# Patient Record
Sex: FEMALE | Race: WHITE | NOT HISPANIC OR LATINO | URBAN - METROPOLITAN AREA
[De-identification: names, ages, dates, MRNs, and addresses within clinical notes are randomized per-mention and may not be internally consistent; named-entity substitution may affect disease eponyms.]

---

## 2017-06-26 ENCOUNTER — INPATIENT (INPATIENT)
Facility: HOSPITAL | Age: 82
LOS: 15 days | Discharge: HOME | DRG: 65 | End: 2017-07-12
Attending: SPECIALIST | Admitting: SPECIALIST
Payer: COMMERCIAL

## 2017-06-26 VITALS
OXYGEN SATURATION: 95 % | RESPIRATION RATE: 16 BRPM | HEART RATE: 57 BPM | TEMPERATURE: 37 F | SYSTOLIC BLOOD PRESSURE: 149 MMHG | DIASTOLIC BLOOD PRESSURE: 82 MMHG

## 2017-06-26 DIAGNOSIS — I61.9 NONTRAUMATIC INTRACEREBRAL HEMORRHAGE, UNSPECIFIED: ICD-10-CM

## 2017-06-26 LAB
ALBUMIN SERPL ELPH-MCNC: 4 G/DL — SIGNIFICANT CHANGE UP (ref 3.3–5)
ALP SERPL-CCNC: 98 U/L — SIGNIFICANT CHANGE UP (ref 40–120)
ALT FLD-CCNC: 14 U/L RC — SIGNIFICANT CHANGE UP (ref 10–45)
ANION GAP SERPL CALC-SCNC: 16 MMOL/L — SIGNIFICANT CHANGE UP (ref 5–17)
APTT BLD: 29.8 SEC — SIGNIFICANT CHANGE UP (ref 27.5–37.4)
AST SERPL-CCNC: 21 U/L — SIGNIFICANT CHANGE UP (ref 10–40)
BASOPHILS # BLD AUTO: 0 K/UL — SIGNIFICANT CHANGE UP (ref 0–0.2)
BASOPHILS NFR BLD AUTO: 0.1 % — SIGNIFICANT CHANGE UP (ref 0–2)
BILIRUB SERPL-MCNC: 1.2 MG/DL — SIGNIFICANT CHANGE UP (ref 0.2–1.2)
BUN SERPL-MCNC: 10 MG/DL — SIGNIFICANT CHANGE UP (ref 7–23)
CALCIUM SERPL-MCNC: 9.5 MG/DL — SIGNIFICANT CHANGE UP (ref 8.4–10.5)
CHLORIDE SERPL-SCNC: 98 MMOL/L — SIGNIFICANT CHANGE UP (ref 96–108)
CK MB BLD-MCNC: 2.8 % — SIGNIFICANT CHANGE UP (ref 0–3.5)
CK MB CFR SERPL CALC: 4.6 NG/ML — HIGH (ref 0–3.8)
CK SERPL-CCNC: 164 U/L — SIGNIFICANT CHANGE UP (ref 25–170)
CO2 SERPL-SCNC: 24 MMOL/L — SIGNIFICANT CHANGE UP (ref 22–31)
CREAT SERPL-MCNC: 0.74 MG/DL — SIGNIFICANT CHANGE UP (ref 0.5–1.3)
EOSINOPHIL # BLD AUTO: 0 K/UL — SIGNIFICANT CHANGE UP (ref 0–0.5)
EOSINOPHIL NFR BLD AUTO: 0.2 % — SIGNIFICANT CHANGE UP (ref 0–6)
ETHANOL SERPL-MCNC: SIGNIFICANT CHANGE UP MG/DL (ref 0–10)
GAS PNL BLDV: SIGNIFICANT CHANGE UP
GLUCOSE SERPL-MCNC: 130 MG/DL — HIGH (ref 70–99)
HCT VFR BLD CALC: 44.9 % — SIGNIFICANT CHANGE UP (ref 34.5–45)
HGB BLD-MCNC: 15.2 G/DL — SIGNIFICANT CHANGE UP (ref 11.5–15.5)
INR BLD: 0.93 RATIO — SIGNIFICANT CHANGE UP (ref 0.88–1.16)
LYMPHOCYTES # BLD AUTO: 0.7 K/UL — LOW (ref 1–3.3)
LYMPHOCYTES # BLD AUTO: 5.1 % — LOW (ref 13–44)
MCHC RBC-ENTMCNC: 33.9 GM/DL — SIGNIFICANT CHANGE UP (ref 32–36)
MCHC RBC-ENTMCNC: 36.3 PG — HIGH (ref 27–34)
MCV RBC AUTO: 107 FL — HIGH (ref 80–100)
MONOCYTES # BLD AUTO: 0.7 K/UL — SIGNIFICANT CHANGE UP (ref 0–0.9)
MONOCYTES NFR BLD AUTO: 4.9 % — SIGNIFICANT CHANGE UP (ref 2–14)
NEUTROPHILS # BLD AUTO: 12.2 K/UL — HIGH (ref 1.8–7.4)
NEUTROPHILS NFR BLD AUTO: 89.7 % — HIGH (ref 43–77)
PLATELET # BLD AUTO: 149 K/UL — LOW (ref 150–400)
POTASSIUM SERPL-MCNC: 4.2 MMOL/L — SIGNIFICANT CHANGE UP (ref 3.5–5.3)
POTASSIUM SERPL-SCNC: 4.2 MMOL/L — SIGNIFICANT CHANGE UP (ref 3.5–5.3)
PROT SERPL-MCNC: 7.4 G/DL — SIGNIFICANT CHANGE UP (ref 6–8.3)
PROTHROM AB SERPL-ACNC: 10.1 SEC — SIGNIFICANT CHANGE UP (ref 9.8–12.7)
RBC # BLD: 4.19 M/UL — SIGNIFICANT CHANGE UP (ref 3.8–5.2)
RBC # FLD: 12.6 % — SIGNIFICANT CHANGE UP (ref 10.3–14.5)
SODIUM SERPL-SCNC: 138 MMOL/L — SIGNIFICANT CHANGE UP (ref 135–145)
TROPONIN T SERPL-MCNC: 0.02 NG/ML — SIGNIFICANT CHANGE UP (ref 0–0.06)
WBC # BLD: 13.6 K/UL — HIGH (ref 3.8–10.5)
WBC # FLD AUTO: 13.6 K/UL — HIGH (ref 3.8–10.5)

## 2017-06-26 PROCEDURE — 72125 CT NECK SPINE W/O DYE: CPT | Mod: 26

## 2017-06-26 PROCEDURE — 71010: CPT | Mod: 26

## 2017-06-26 PROCEDURE — 72170 X-RAY EXAM OF PELVIS: CPT | Mod: 26

## 2017-06-26 PROCEDURE — 70450 CT HEAD/BRAIN W/O DYE: CPT | Mod: 26

## 2017-06-26 PROCEDURE — 73562 X-RAY EXAM OF KNEE 3: CPT | Mod: 26,LT

## 2017-06-26 PROCEDURE — 99285 EMERGENCY DEPT VISIT HI MDM: CPT

## 2017-06-26 RX ORDER — NYSTATIN CREAM 100000 [USP'U]/G
1 CREAM TOPICAL EVERY 8 HOURS
Qty: 0 | Refills: 0 | Status: DISCONTINUED | OUTPATIENT
Start: 2017-06-26 | End: 2017-07-12

## 2017-06-26 RX ORDER — NICARDIPINE HYDROCHLORIDE 30 MG/1
5 CAPSULE, EXTENDED RELEASE ORAL
Qty: 40 | Refills: 0 | Status: DISCONTINUED | OUTPATIENT
Start: 2017-06-26 | End: 2017-06-27

## 2017-06-26 RX ORDER — ACETAMINOPHEN 500 MG
650 TABLET ORAL EVERY 6 HOURS
Qty: 0 | Refills: 0 | Status: COMPLETED | OUTPATIENT
Start: 2017-06-26 | End: 2018-05-25

## 2017-06-26 RX ORDER — ACETAMINOPHEN 500 MG
650 TABLET ORAL EVERY 6 HOURS
Qty: 0 | Refills: 0 | Status: DISCONTINUED | OUTPATIENT
Start: 2017-06-26 | End: 2017-07-01

## 2017-06-26 RX ORDER — HYDRALAZINE HCL 50 MG
10 TABLET ORAL EVERY 6 HOURS
Qty: 0 | Refills: 0 | Status: DISCONTINUED | OUTPATIENT
Start: 2017-06-26 | End: 2017-07-07

## 2017-06-26 RX ORDER — ACETAMINOPHEN 500 MG
1000 TABLET ORAL ONCE
Qty: 0 | Refills: 0 | Status: COMPLETED | OUTPATIENT
Start: 2017-06-26 | End: 2017-06-27

## 2017-06-26 RX ORDER — SODIUM CHLORIDE 9 MG/ML
1000 INJECTION INTRAMUSCULAR; INTRAVENOUS; SUBCUTANEOUS
Qty: 0 | Refills: 0 | Status: DISCONTINUED | OUTPATIENT
Start: 2017-06-26 | End: 2017-06-27

## 2017-06-26 RX ORDER — HYDRALAZINE HCL 50 MG
5 TABLET ORAL EVERY 6 HOURS
Qty: 0 | Refills: 0 | Status: DISCONTINUED | OUTPATIENT
Start: 2017-06-26 | End: 2017-06-26

## 2017-06-26 RX ADMIN — NYSTATIN CREAM 1 APPLICATION(S): 100000 CREAM TOPICAL at 23:04

## 2017-06-26 RX ADMIN — Medication 10 MILLIGRAM(S): at 18:07

## 2017-06-26 RX ADMIN — Medication 650 MILLIGRAM(S): at 23:40

## 2017-06-26 RX ADMIN — SODIUM CHLORIDE 50 MILLILITER(S): 9 INJECTION INTRAMUSCULAR; INTRAVENOUS; SUBCUTANEOUS at 20:45

## 2017-06-26 RX ADMIN — Medication 650 MILLIGRAM(S): at 23:04

## 2017-06-26 NOTE — ED PROVIDER NOTE - ATTENDING CONTRIBUTION TO CARE
Private Physician Dina Barton, Previouisly admitted to [facility] female Private Physician HTN,HLD,etoh,mi, Lives alone found by aide this am. Pt had tried to get out of bed had left weakness. Was on floor for several hours. NCAT slight left facial droop. NCat chest clear anterior & posterior abd soft neuro gcs 15 left hemiplegia.  Piero Baptiste MD, Facep

## 2017-06-26 NOTE — H&P ADULT - PROBLEM SELECTOR PLAN 1
[] Strict BP control goal <160/90   [] Repeat CTH in 24hrs or prn for worsening mental status or neuro exam  [] q2 neuro checks, vitals  [] hold ASA, lovenox, use mechanical DVT prophylaxis  [] Telemetry Monitoring  [] MRI brain w/ and w/o cont to look for underlying lesions, malformations. MRA brain w/o contast MRA neck w/con  [] HgA1c, Lipid profile

## 2017-06-26 NOTE — H&P ADULT - NSHPLABSRESULTS_GEN_ALL_CORE
LABS:  CBC Full  -  ( 26 Jun 2017 14:08 )  WBC Count : 13.6 K/uL  Hemoglobin : 15.2 g/dL  Hematocrit : 44.9 %  Platelet Count - Automated : 149 K/uL  Mean Cell Volume : 107.0 fl  Mean Cell Hemoglobin : 36.3 pg  Mean Cell Hemoglobin Concentration : 33.9 gm/dL  Auto Neutrophil # : x  Auto Lymphocyte # : x  Auto Monocyte # : x  Auto Eosinophil # : x  Auto Basophil # : x  Auto Neutrophil % : x  Auto Lymphocyte % : x  Auto Monocyte % : x  Auto Eosinophil % : x  Auto Basophil % : x      06-26    138  |  98  |  10  ----------------------------<  130<H>  4.2   |  24  |  0.74    Ca    9.5      26 Jun 2017 14:08    TPro  7.4  /  Alb  4.0  /  TBili  1.2  /  DBili  x   /  AST  21  /  ALT  14  /  AlkPhos  98  06-26    LIVER FUNCTIONS - ( 26 Jun 2017 14:08 )  Alb: 4.0 g/dL / Pro: 7.4 g/dL / ALK PHOS: 98 U/L / ALT: 14 U/L RC / AST: 21 U/L / GGT: x           Hemoglobin A1C:       PT/INR - ( 26 Jun 2017 14:08 )   PT: 10.1 sec;   INR: 0.93 ratio         PTT - ( 26 Jun 2017 14:08 )  PTT:29.8 sec      RADIOLOGY  Noncontrast CT brain: Acute right intraparenchymal hemorrhage as   described above.    CT cervicalspine:  No evidence for acute displaced fracture or traumatic   malalignment. Cervical degenerative spondylosis, as described above. MRI   can be performed if there is concern for ligamentous or cord injury, and   if there are no MRI contraindications.

## 2017-06-26 NOTE — H&P ADULT - ASSESSMENT
82 F PMH HTN, GERD, EOTH abuse, choly p/w left facial, left sided weakness. PE dysarthria, Left facial left hemiplegia. CTh Acute intraparenchymal hemorrhage just lateral to the right thalamic body. NIHSS 11, MRS 3 Dysphagia pending ICH vol 82 F PMH HTN, GERD, EOTH abuse, choly p/w left facial, left sided weakness. PE dysarthria, Left facial left hemiplegia. CTh Acute intraparenchymal hemorrhage just lateral to the right thalamic body. NIHSS 11, MRS 3 Dysphagia pending ICH vol 4 ml, ICH score 1

## 2017-06-26 NOTE — H&P ADULT - ATTENDING COMMENTS
VASCULAR NEUROLOGY ATTENDING  The patient is seen and examined the history and imaging are reviewed. I agree with the resident note unless otherwise noted. In brief 82 year old woman with HTN presents with R BG presumed hypertensive hemorrhage. On exam this AM moderate to severe dysarthria. L facial L hemiplegia and decreased L sided sensation. Impression: R hypertensive ICH. Will maintain SBP about below 160 repeat CT head today. Avoid hyponatremia. MRI wwo contrast.  CIWA. PT/OT/SS evals.

## 2017-06-26 NOTE — ED PROVIDER NOTE - OBJECTIVE STATEMENT
82yF h/o etoh abuse, HTN, HLD, UTI BIBEMS after fall out of bed this morning with L sided facial droop and L upper and lower extremity weakness. Last known normal yesterday. Patient reports last drink yesterday. Denies pain. Denies head trauma or LOC. On daily ASA.  Code stroke called upon arrival.

## 2017-06-26 NOTE — ED PROVIDER NOTE - PMH
Dyslipidemia    ETOH abuse    Gout    HTN (hypertension)    MI (myocardial infarction)    Personal history of asbestosis    UTI (lower urinary tract infection)

## 2017-06-26 NOTE — H&P ADULT - HISTORY OF PRESENT ILLNESS
Neurology consult    FRANKI [id number]     Patient is a 82y old  Female who presents with a chief complaint of left sided weakness    HPI: 82 F PMH HTN, GERn, EOTH abuse, choly p/w left side weakness. Code Stroke 13:47. LNK 1 am. Pt woke up this am tried to get out of bed and fell to the floor, Aide noted left sided weakness, dysarthria. Code stroke called for above. Pt poor historian, limited hs, Endorses HA, Nausea and left sided numbness. At Baseline walks with cane and aide helps with ADLs.          MEDICATIONS    allopurinol, ASA, Citalopram, metoprolol, famotidine, synthroid, atorvastatin    PMH: ETOH abuse  UTI (lower urinary tract infection)  Dyslipidemia  Gout  Personal history of asbestosis  HTN (hypertension)  MI (myocardial infarction)       PSH: History of benign breast tumor  History of left shoulder fracture      Family history: No history of dementia, strokes, or seizures   FAMILY HISTORY:  No pertinent family history in first degree relatives      SOCIAL HISTORY:  No history of tobacco or alcohol use     Allergies    No Known Allergies    Intolerances            Vital Signs Last 24 Hrs  T(C): 2.6, Max: 2.6 (06-26 @ 13:47)  T(F): 36.6, Max: 36.6 (06-26 @ 13:47)  HR: 57 (57 - 57)  BP: 149/82 (149/82 - 149/82)  BP(mean): --  RR: 16 (16 - 16)  SpO2: 95% (95% - 95%)      On Neurological Examination:    Mental Status - Patient is alert, awake, oriented X3. fluent, names, no dysarthria no aphasia Follows commands well and able to answer questions appropriately. Mood and affect  normal    Cranial Nerves - PERRL, EOMI, VFF, V1-V3 intact, no gross facial asymmetry, tongue/uvula midline    Motor Exam -   Right upper  Left upper  Right lower  Left lower      nml bulk/tone    Sensory    Intact to light touch and pinprick bilaterally    Coord: FTN intact bilaterally     Gait -  normal  ataxia     Reflexes:                                                         GENERAL Exam:     Nontoxic , No Acute Distress   	  HEENT:  normocephalic, atraumatic  		  LUNGS:	Clear bilaterally  No Wheeze  Decreased bilaterally  	  HEART:	 Normal S1S2   No murmur RRR        	  GI/ ABDOMEN:  Soft  Non tender    EXTREMITIES:   No Edema  No Clubbing  No Cyanosis No Edema    MUSCULOSKELETAL: Normal Range of Motion  	   SKIN:      Normal   No Ecchymosis               LABS:  CBC Full  -  ( 26 Jun 2017 14:08 )  WBC Count : 13.6 K/uL  Hemoglobin : 15.2 g/dL  Hematocrit : 44.9 %  Platelet Count - Automated : 149 K/uL  Mean Cell Volume : 107.0 fl  Mean Cell Hemoglobin : 36.3 pg  Mean Cell Hemoglobin Concentration : 33.9 gm/dL  Auto Neutrophil # : x  Auto Lymphocyte # : x  Auto Monocyte # : x  Auto Eosinophil # : x  Auto Basophil # : x  Auto Neutrophil % : x  Auto Lymphocyte % : x  Auto Monocyte % : x  Auto Eosinophil % : x  Auto Basophil % : x      06-26    138  |  98  |  10  ----------------------------<  130<H>  4.2   |  24  |  0.74    Ca    9.5      26 Jun 2017 14:08    TPro  7.4  /  Alb  4.0  /  TBili  1.2  /  DBili  x   /  AST  21  /  ALT  14  /  AlkPhos  98  06-26    LIVER FUNCTIONS - ( 26 Jun 2017 14:08 )  Alb: 4.0 g/dL / Pro: 7.4 g/dL / ALK PHOS: 98 U/L / ALT: 14 U/L RC / AST: 21 U/L / GGT: x           Hemoglobin A1C:       PT/INR - ( 26 Jun 2017 14:08 )   PT: 10.1 sec;   INR: 0.93 ratio         PTT - ( 26 Jun 2017 14:08 )  PTT:29.8 sec      RADIOLOGY  CTH   MRI: Neurology consult    FRANKI [id number]     Patient is a 82y old  Female who presents with a chief complaint of left sided weakness    HPI: 82 F PMH HTN, GERD, EOTH abuse, choly on ASA p/w left side weakness. Code Stroke 13:47. LNK 1 am. Pt woke up this am tried to get out of bed and fell to the floor, Aide noted left sided weakness, dysarthria. Code stroke called for above. Pt poor historian, limited hs, Endorses HA, Nausea and left sided numbness. At Baseline walks with cane and aide helps with ADLs.          MEDICATIONS    allopurinol, ASA, Citalopram, metoprolol, famotidine, synthroid, atorvastatin      SOCIAL HISTORY:  No history of tobacco or alcohol use     Allergies    No Known Allergies    Intolerances

## 2017-06-26 NOTE — ED PROVIDER NOTE - MEDICAL DECISION MAKING DETAILS
82yF h/o etoh, HTN, HLD BIBEMS with L sided weakness and fall out of bed this morning. On examination, no motor strength against gravity on LUE or LLE with L sided facial droop and slurred speech. Normal FSG. Last known normal yesterday. Code stroke called upon arrival. Ho PGY3

## 2017-06-26 NOTE — H&P ADULT - NSHPREVIEWOFSYSTEMS_GEN_ALL_CORE
REVIEW OF SYSTEMS:    Constitutional: No fever, chills, fatigue, weakness  Eyes: no eye pain, visual disturbances, or discharge  ENT:  No difficulty hearing, tinnitus, vertigo; No sinus or throat pain  Neck: No pain or stiffness  Respiratory: No cough, dyspnea, wheezing   Cardiovascular: No chest pain, palpitations,   Gastrointestinal: + Nausea  Genitourinary: No dysuria, frequency, hematuria or incontinence  Neurological: see HPI  Psychiatric: No depression, anxiety, mood swings or difficulty sleeping  Musculoskeletal: No joint pain or swelling; No muscle, back or extremity pain  Skin: No itching, burning, rashes or lesions   Lymph Nodes: No enlarged glands  Endocrine: No heat or cold intolerance; No hair loss, No h/o diabetes or thyroid dysfunction  Allergy and Immunologic: No hives or eczema

## 2017-06-27 ENCOUNTER — TRANSCRIPTION ENCOUNTER (OUTPATIENT)
Age: 82
End: 2017-06-27

## 2017-06-27 LAB
ALBUMIN SERPL ELPH-MCNC: 3.4 G/DL — SIGNIFICANT CHANGE UP (ref 3.3–5)
ALP SERPL-CCNC: 84 U/L — SIGNIFICANT CHANGE UP (ref 40–120)
ALT FLD-CCNC: 12 U/L — SIGNIFICANT CHANGE UP (ref 10–45)
ANION GAP SERPL CALC-SCNC: 17 MMOL/L — SIGNIFICANT CHANGE UP (ref 5–17)
APPEARANCE UR: ABNORMAL
AST SERPL-CCNC: 30 U/L — SIGNIFICANT CHANGE UP (ref 10–40)
BILIRUB DIRECT SERPL-MCNC: 0.2 MG/DL — SIGNIFICANT CHANGE UP (ref 0–0.2)
BILIRUB INDIRECT FLD-MCNC: 1 MG/DL — SIGNIFICANT CHANGE UP (ref 0.2–1)
BILIRUB SERPL-MCNC: 1.2 MG/DL — SIGNIFICANT CHANGE UP (ref 0.2–1.2)
BILIRUB UR-MCNC: ABNORMAL
BUN SERPL-MCNC: 12 MG/DL — SIGNIFICANT CHANGE UP (ref 7–23)
CALCIUM SERPL-MCNC: 8.5 MG/DL — SIGNIFICANT CHANGE UP (ref 8.4–10.5)
CHLORIDE SERPL-SCNC: 100 MMOL/L — SIGNIFICANT CHANGE UP (ref 96–108)
CO2 SERPL-SCNC: 20 MMOL/L — LOW (ref 22–31)
COLOR SPEC: YELLOW — SIGNIFICANT CHANGE UP
CREAT SERPL-MCNC: 0.87 MG/DL — SIGNIFICANT CHANGE UP (ref 0.5–1.3)
DIFF PNL FLD: NEGATIVE — SIGNIFICANT CHANGE UP
EPI CELLS # UR: SIGNIFICANT CHANGE UP /HPF
GLUCOSE SERPL-MCNC: 94 MG/DL — SIGNIFICANT CHANGE UP (ref 70–99)
GLUCOSE UR QL: NEGATIVE — SIGNIFICANT CHANGE UP
HCT VFR BLD CALC: 44.9 % — SIGNIFICANT CHANGE UP (ref 34.5–45)
HGB BLD-MCNC: 14.4 G/DL — SIGNIFICANT CHANGE UP (ref 11.5–15.5)
KETONES UR-MCNC: NEGATIVE — SIGNIFICANT CHANGE UP
LEUKOCYTE ESTERASE UR-ACNC: ABNORMAL
MAGNESIUM SERPL-MCNC: 1.7 MG/DL — SIGNIFICANT CHANGE UP (ref 1.6–2.6)
MCHC RBC-ENTMCNC: 32.1 GM/DL — SIGNIFICANT CHANGE UP (ref 32–36)
MCHC RBC-ENTMCNC: 34 PG — SIGNIFICANT CHANGE UP (ref 27–34)
MCV RBC AUTO: 106.1 FL — HIGH (ref 80–100)
NITRITE UR-MCNC: NEGATIVE — SIGNIFICANT CHANGE UP
PH UR: 6 — SIGNIFICANT CHANGE UP (ref 5–8)
PHOSPHATE SERPL-MCNC: 3.2 MG/DL — SIGNIFICANT CHANGE UP (ref 2.5–4.5)
PLATELET # BLD AUTO: 122 K/UL — LOW (ref 150–400)
POTASSIUM SERPL-MCNC: 4.3 MMOL/L — SIGNIFICANT CHANGE UP (ref 3.5–5.3)
POTASSIUM SERPL-SCNC: 4.3 MMOL/L — SIGNIFICANT CHANGE UP (ref 3.5–5.3)
PROT SERPL-MCNC: 6.6 G/DL — SIGNIFICANT CHANGE UP (ref 6–8.3)
PROT UR-MCNC: 30 MG/DL
RBC # BLD: 4.23 M/UL — SIGNIFICANT CHANGE UP (ref 3.8–5.2)
RBC # FLD: 14.7 % — HIGH (ref 10.3–14.5)
RBC CASTS # UR COMP ASSIST: SIGNIFICANT CHANGE UP /HPF (ref 0–2)
SODIUM SERPL-SCNC: 137 MMOL/L — SIGNIFICANT CHANGE UP (ref 135–145)
SP GR SPEC: 1.02 — SIGNIFICANT CHANGE UP (ref 1.01–1.02)
UROBILINOGEN FLD QL: 2
WBC # BLD: 6.34 K/UL — SIGNIFICANT CHANGE UP (ref 3.8–10.5)
WBC # FLD AUTO: 6.34 K/UL — SIGNIFICANT CHANGE UP (ref 3.8–10.5)
WBC UR QL: SIGNIFICANT CHANGE UP /HPF (ref 0–5)

## 2017-06-27 PROCEDURE — 70450 CT HEAD/BRAIN W/O DYE: CPT | Mod: 26

## 2017-06-27 RX ORDER — CEFTRIAXONE 500 MG/1
1 INJECTION, POWDER, FOR SOLUTION INTRAMUSCULAR; INTRAVENOUS ONCE
Qty: 0 | Refills: 0 | Status: COMPLETED | OUTPATIENT
Start: 2017-06-27 | End: 2017-06-27

## 2017-06-27 RX ORDER — ONDANSETRON 8 MG/1
4 TABLET, FILM COATED ORAL ONCE
Qty: 0 | Refills: 0 | Status: COMPLETED | OUTPATIENT
Start: 2017-06-27 | End: 2017-06-27

## 2017-06-27 RX ORDER — CEFTRIAXONE 500 MG/1
INJECTION, POWDER, FOR SOLUTION INTRAMUSCULAR; INTRAVENOUS
Qty: 0 | Refills: 0 | Status: DISCONTINUED | OUTPATIENT
Start: 2017-06-27 | End: 2017-07-05

## 2017-06-27 RX ORDER — ACETAMINOPHEN 500 MG
1000 TABLET ORAL ONCE
Qty: 0 | Refills: 0 | Status: COMPLETED | OUTPATIENT
Start: 2017-06-27 | End: 2017-06-27

## 2017-06-27 RX ORDER — SODIUM CHLORIDE 9 MG/ML
1000 INJECTION INTRAMUSCULAR; INTRAVENOUS; SUBCUTANEOUS
Qty: 0 | Refills: 0 | Status: DISCONTINUED | OUTPATIENT
Start: 2017-06-27 | End: 2017-06-29

## 2017-06-27 RX ORDER — CEFTRIAXONE 500 MG/1
1 INJECTION, POWDER, FOR SOLUTION INTRAMUSCULAR; INTRAVENOUS EVERY 24 HOURS
Qty: 0 | Refills: 0 | Status: DISCONTINUED | OUTPATIENT
Start: 2017-06-28 | End: 2017-07-05

## 2017-06-27 RX ORDER — ONDANSETRON 8 MG/1
4 TABLET, FILM COATED ORAL EVERY 6 HOURS
Qty: 0 | Refills: 0 | Status: DISCONTINUED | OUTPATIENT
Start: 2017-06-27 | End: 2017-07-12

## 2017-06-27 RX ADMIN — NYSTATIN CREAM 1 APPLICATION(S): 100000 CREAM TOPICAL at 16:45

## 2017-06-27 RX ADMIN — ONDANSETRON 4 MILLIGRAM(S): 8 TABLET, FILM COATED ORAL at 00:30

## 2017-06-27 RX ADMIN — Medication 1000 MILLIGRAM(S): at 14:45

## 2017-06-27 RX ADMIN — Medication 400 MILLIGRAM(S): at 12:40

## 2017-06-27 RX ADMIN — Medication 10 MILLIGRAM(S): at 22:56

## 2017-06-27 RX ADMIN — Medication 650 MILLIGRAM(S): at 22:30

## 2017-06-27 RX ADMIN — Medication 400 MILLIGRAM(S): at 13:44

## 2017-06-27 RX ADMIN — Medication 10 MILLIGRAM(S): at 08:47

## 2017-06-27 RX ADMIN — Medication 650 MILLIGRAM(S): at 23:00

## 2017-06-27 RX ADMIN — NYSTATIN CREAM 1 APPLICATION(S): 100000 CREAM TOPICAL at 07:27

## 2017-06-27 RX ADMIN — Medication 1000 MILLIGRAM(S): at 11:43

## 2017-06-27 RX ADMIN — NYSTATIN CREAM 1 APPLICATION(S): 100000 CREAM TOPICAL at 22:31

## 2017-06-27 RX ADMIN — CEFTRIAXONE 100 GRAM(S): 500 INJECTION, POWDER, FOR SOLUTION INTRAMUSCULAR; INTRAVENOUS at 10:49

## 2017-06-27 RX ADMIN — ONDANSETRON 4 MILLIGRAM(S): 8 TABLET, FILM COATED ORAL at 19:54

## 2017-06-27 NOTE — DIETITIAN INITIAL EVALUATION ADULT. - ENERGY NEEDS
Ht: 63"  Wt: 180  BMI: 31.9 kg/m2   IBW:115  (+/-10%)    156% IBW  Edema: none    Skin: no pressure injuries

## 2017-06-27 NOTE — OCCUPATIONAL THERAPY INITIAL EVALUATION ADULT - PLANNED THERAPY INTERVENTIONS, OT EVAL
ADL retraining/motor coordination training/transfer training/fine motor coordination training/strengthening/balance training/neuromuscular re-education/ROM/bed mobility training

## 2017-06-27 NOTE — DISCHARGE NOTE ADULT - PLAN OF CARE
Prevention of recurrence Please follow up with vascular neurology, Dr. Story Please continue all meds as directed and follow up with Dr. Story as next available.  Please repeat EEG in 3 months to re-evaluate need to continue with Keppra.  Please follow-up with your cardiologist Deandre Schmitz as next available. Please continue all meds as directed and follow up with Dr. Story as next available.  Please repeat EEG in 3 months to re-evaluate need to continue with Keppra.  Please follow-up with your cardiologist Dr. Carrera as next available.

## 2017-06-27 NOTE — SWALLOW BEDSIDE ASSESSMENT ADULT - SWALLOW EVAL: PATIENT/FAMILY GOALS STATEMENT
Pt denies prior h/o dysphagia. Pt repeatedly asking for water, unable to understand explanations of rationale for NPO status, despite multiple attempts to rephrase.

## 2017-06-27 NOTE — DISCHARGE NOTE ADULT - OTHER SIGNIFICANT FINDINGS
A1c 5.5, LDL 86    CT Head 06/26/17:   Acute intraparenchymal hemorrhage just lateral to the right thalamic body which measures 2.7 x 1.5 x 1.9 cm (AP x TV x CC). There is no midline shift or central herniation. Prominent ventricles and sulci secondary to parenchymal volume loss.    CT Head 06/27/17:  Grossly stable 2.9 x 1.9 cm parenchymal hemorrhage in the region of the posterior right lentiform nucleus.    MRI Brain w/o:   Right thalamocapsular region hemorrhage as seen on the prior CT. No abnormal enhancement to suggest an underlying lesion however recommend repeat study post resolution of the hemorrhage for better evaluation    MRA Head/Neck:   Suspicion of a moderate stenosis approximately 2 cm distal to the origin of the right internal carotid artery. Atherosclerotic irregularity at the level of the left internal carotid artery is suggested. Recommend correlation with other noninvasive vascular evaluation. Consider CTA and/or Doppler to better quantify the degree of stenosis.    TTE: Mildly dilated LA, LV EF 65%   1. Normal left ventricular internal dimensions and wall thicknesses.  2. Normal left ventricular systolic function. No segmental wall motion abnormalities.  3. Normal diastolic function  4. Normal right ventricular size and function.

## 2017-06-27 NOTE — OCCUPATIONAL THERAPY INITIAL EVALUATION ADULT - NS ASR FOLLOW COMMAND OT EVAL
100% of the time/difficulty with multistep commands, decreased attention, tangential/able to follow single-step instructions

## 2017-06-27 NOTE — DISCHARGE NOTE ADULT - NS AS DC STROKE ED MATERIALS
Stroke Warning Signs and Symptoms/Risk Factors for Stroke/Need for Followup After Discharge/Stroke Education Booklet/Prescribed Medications/Call 911 for Stroke

## 2017-06-27 NOTE — DISCHARGE NOTE ADULT - MEDICATION SUMMARY - MEDICATIONS TO TAKE
I will START or STAY ON the medications listed below when I get home from the hospital:    lisinopril 40 mg oral tablet  -- 1 tab(s) by mouth once a day  -- Indication: For HTN    CeleXA 40 mg oral tablet  -- 1 tab(s) by mouth once a day  -- Indication: For antidepressant    ondansetron 4 mg oral tablet  -- 1 tab(s) by mouth every 6 hours, As needed, Nausea and/or Vomiting  -- Indication: For Nausea    allopurinol 300 mg oral tablet  -- 1 tab(s) by mouth once a day  -- Indication: For gout    metoprolol succinate 25 mg oral tablet, extended release  -- 1 tab(s) by mouth once a day  -- Indication: For HTN    nystatin 100,000 units/g topical cream  -- 1 application on skin every 8 hours  -- Indication: For antifungal cream    Pepcid 20 mg oral tablet  --  by mouth once a day  -- Indication: For PPI    levothyroxine 50 mcg (0.05 mg) oral tablet  -- 1 tab(s) by mouth once a day  -- Indication: For Hypothyroidism    folic acid 1 mg oral tablet  -- 1 tab(s) by mouth once a day  -- Indication: For supplement I will START or STAY ON the medications listed below when I get home from the hospital:    lisinopril 40 mg oral tablet  -- 1 tab(s) by mouth once a day  -- Indication: For HTN    Keppra 500 mg oral tablet  -- 1 tab(s) by gastrostomy tube 2 times a day  -- Indication: For Seizure    LORazepam 1 mg oral tablet  -- 1 tab(s) by mouth every 8 hours, As needed, Agitation  -- Indication: For Agitation    CeleXA 40 mg oral tablet  -- 1 tab(s) by mouth once a day  -- Indication: For antidepressant    ondansetron 4 mg oral tablet  -- 1 tab(s) by mouth every 6 hours, As needed, Nausea and/or Vomiting  -- Indication: For Nausea    allopurinol 300 mg oral tablet  -- 1 tab(s) by mouth once a day  -- Indication: For gout    QUEtiapine 25 mg oral tablet  -- 1 tab(s) by mouth once a day (at bedtime)  -- Indication: For Delirium    metoprolol succinate 25 mg oral tablet, extended release  -- 1 tab(s) by mouth once a day  -- Indication: For HTN    amLODIPine 10 mg oral tablet  -- 1 tab(s) by mouth once a day  -- Indication: For HTN    nystatin 100,000 units/g topical cream  -- 1 application on skin every 8 hours  -- Indication: For antifungal cream    Pepcid 20 mg oral tablet  --  by mouth once a day  -- Indication: For PPI    levothyroxine 50 mcg (0.05 mg) oral tablet  -- 1 tab(s) by mouth once a day  -- Indication: For Hypothyroidism    folic acid 1 mg oral tablet  -- 1 tab(s) by mouth once a day  -- Indication: For supplement I will START or STAY ON the medications listed below when I get home from the hospital:    lisinopril 40 mg oral tablet  -- 1 tab(s) by mouth once a day  -- Indication: For HTN    Keppra 500 mg oral tablet  -- 1 tab(s) by gastrostomy tube 2 times a day  -- Indication: For Seizure    LORazepam 1 mg oral tablet  -- 1 tab(s) by mouth every 8 hours, As needed, Agitation  -- Indication: For Agitation    CeleXA 40 mg oral tablet  -- 1 tab(s) by mouth once a day  -- Indication: For antidepressant    ondansetron 4 mg oral tablet  -- 1 tab(s) by mouth every 6 hours, As needed, Nausea and/or Vomiting  -- Indication: For Nausea    allopurinol 300 mg oral tablet  -- 1 tab(s) by mouth once a day  -- Indication: For gout    QUEtiapine 25 mg oral tablet  -- 1 tab(s) by mouth once a day (at bedtime)  -- Indication: For Delirium    metoprolol succinate 25 mg oral tablet, extended release  -- 1 tab(s) by mouth once a day  -- Indication: For HTN    amLODIPine 10 mg oral tablet  -- 1 tab(s) by mouth once a day  -- Indication: For HTN    nystatin 100,000 units/g topical cream  -- 1 application on skin every 8 hours  -- Indication: For antifungal cream    Pepcid 20 mg oral tablet  --  by mouth once a day  -- Indication: For GERD    pantoprazole 40 mg oral granule, enteric coated  -- 40 milligram(s) by mouth once a day  -- Indication: For GERD    levothyroxine 50 mcg (0.05 mg) oral tablet  -- 1 tab(s) by mouth once a day  -- Indication: For Hypothyroidism    folic acid 1 mg oral tablet  -- 1 tab(s) by mouth once a day  -- Indication: For supplement

## 2017-06-27 NOTE — OCCUPATIONAL THERAPY INITIAL EVALUATION ADULT - RANGE OF MOTION EXAMINATION, UPPER EXTREMITY
Right UE Active ROM was WFL (within functional limits)/Left UE Active ROM was WFL (within functional limits)

## 2017-06-27 NOTE — PHYSICAL THERAPY INITIAL EVALUATION ADULT - PASSIVE RANGE OF MOTION EXAMINATION, REHAB EVAL
L hand fisted/bilateral upper extremity Passive ROM was WFL (within functional limits)/bilateral lower extremity Passive ROM was WFL (within functional limits)

## 2017-06-27 NOTE — DISCHARGE NOTE ADULT - CARE PROVIDER_API CALL
Torsten Story (DO), Neurology; Vascular Neurology  [address] [facility] [address]  [place] [address]  Phone: [phone]  Fax: [phone] Torsten Story (DO), Neurology; Vascular Neurology  [address] [facility] [address]  [place] [address]  Phone: [phone]  Fax: [phone]    Xiao Carrera), Cardiovascular Disease; Internal Medicine  [address] [facility] [address] [place] [address]  Phone: [phone]  Fax: [phone]

## 2017-06-27 NOTE — OCCUPATIONAL THERAPY INITIAL EVALUATION ADULT - LIVES WITH, PROFILE
pt lives alone in a co-op with elevator, bed/bath on 1st level, bath with shower chair, pt has an aid from 9-5/7 days a week to assist with ADLs/alone

## 2017-06-27 NOTE — SPEECH LANGUAGE PATHOLOGY EVALUATION - SLP DIAGNOSIS
Attempted to see pt for swallow evaluation and speech-language evaluation. Chart reviewed. Pt currently off unit for test.  Will attempt to f/u, as time permits. Pt seen for initiation of speech and language evaluation. Pt presents with dysarthria notable for reduced articulatory precision and cognitive-linguistics notable for impairments in attention, executive functioning and working memory.

## 2017-06-27 NOTE — PHYSICAL THERAPY INITIAL EVALUATION ADULT - PERTINENT HX OF CURRENT PROBLEM, REHAB EVAL
82yoF, pmhx below. p/w chief complaint of L-sided weakness. Code Stroke 13:47. Pt reporting waking up this AM, tried to get out of bed and fell to the floor, aide noted L-sided weakness, dysarthria. Code stroke called for above. CT head 6/26 Acute right intraparenchymal hemorrhage, CT C-spine Cervical degenerative spondylosis. CXR 6/26 Orthopedic hardware is seen in the left shoulder and left humerus. pelvis xray (-) 6/26.

## 2017-06-27 NOTE — SPEECH LANGUAGE PATHOLOGY EVALUATION - COMMENTS
Hx cont'd: Neuro Exam: Mental Status - is alert, awake, OX3. dysarthric, names, fluent. Cranial Nerves - PERRL, EOMI, VFF, V1-V3 intact, L facial droop, tongue/uvula midline. Motor Exam - RUE 4+/5, LUE 1/5, RLE 4/5 subtle drift, LLE 1/5, nml bulk/tone; Sensory - decreased to LT LUe, LLE  CT C-spine:  No evidence for acute displaced fracture or traumatic malalignment. Cervical degenerative spondylosis, as described above. MRI   can be performed if concern for ligamentous or cord injury, and if no contraindications.

## 2017-06-27 NOTE — PHYSICAL THERAPY INITIAL EVALUATION ADULT - IMPAIRED TRANSFERS: SIT/STAND, REHAB EVAL
decreased strength/impaired postural control/narrow base of support/impaired balance/cognition/impaired motor control

## 2017-06-27 NOTE — OCCUPATIONAL THERAPY INITIAL EVALUATION ADULT - PERTINENT HX OF CURRENT PROBLEM, REHAB EVAL
82 F PMH HTN, GERD, EOTH abuse, choly on ASA p/w left side weakness. Code Stroke 13:47. LNK 1 am. Pt woke up this am tried to get out of bed and fell to the floor, Aide noted left sided weakness, dysarthria. Pt poor historian, limited hs, Endorses HA, Nausea and left sided numbness. At Baseline walks with cane and aide helps with ADLs.

## 2017-06-27 NOTE — DISCHARGE NOTE ADULT - MEDICATION SUMMARY - MEDICATIONS TO STOP TAKING
I will STOP taking the medications listed below when I get home from the hospital:    aspirin 81 mg oral delayed release tablet  -- 1 tab(s) by mouth once a day    Lipitor 10 mg oral tablet  -- 1 tab(s) by mouth once a day

## 2017-06-27 NOTE — PHYSICAL THERAPY INITIAL EVALUATION ADULT - TRANSFER SAFETY CONCERNS NOTED: SIT/STAND, REHAB EVAL
decreased balance during turns/decreased safety awareness/decreased sequencing ability/decreased weight-shifting ability/losing balance/LLE buckling, requiring L knee block/decreased step length

## 2017-06-27 NOTE — SWALLOW BEDSIDE ASSESSMENT ADULT - SLP PERTINENT HISTORY OF CURRENT PROBLEM
82 F PMH HTN, GERD, EOTH abuse, choly on ASA p/w left side weakness, left facial droop, dysarthria. Code Stroke 13:47. LNK 1 am. Pt woke up this am tried to get out of bed and fell to floor, Aide noted L side weakness, dysarthria. Code stroke called. Pt poor historian, limited hx, Endorses HA, Nausea and L sided numbness. At Baseline walks with cane, aide helps with ADLs. CTH: Acute R intraparenchymal hemorrhage just lateral to R thalamic body. NIHSS 11, MRS 3 Dysphagia pending ICH vol 4 ml, ICH score 1.

## 2017-06-27 NOTE — OCCUPATIONAL THERAPY INITIAL EVALUATION ADULT - RANGE OF MOTION EXAMINATION, LOWER EXTREMITY
Left LE Active ROM was WFL (within functional limits)/Right LE Active ROM was WFL   (within functional limits)

## 2017-06-27 NOTE — DISCHARGE NOTE ADULT - HOSPITAL COURSE
81 y/o female w/ PMH of HTN, GERD, ETOH abuse and on ASA who presented with left sided weakness, dysarthria, and left facial droop. Pt reported she fell out of bed and was found 4 hours later by her aide. Code stroke called, no tPA given due to being outside the window and IPH on CTH. Pt endorses a persistent headache spaning from her neck to her forehead. CTh revealed acute intraparenchymal hemorrhage in the posterior right lentiform nucleus.    Urinalysis positive for UTI, treated with IV ceftriaxone.    Pt failed MBS and PEG to be placed on 6/30    MRI Brain with and without contrast on 6/28 showed right thalamocapsular region hemorrhage as seen on the prior CT.  MRA Head and Neck with and without contrast on 6/28 suggested moderate stenosis approximately 2 cm distal to the origin of the right internal carotid artery and atherosclerotic irregularity at the level of the left internal carotid artery.  Had an episode of left facial twitching for which a routine EEG was ordered and showed_____.    Patient is now deemed safe for discharge to acute rehab. 83 y/o female w/ PMH of HTN, GERD, ETOH abuse and on ASA who presented with left sided weakness, dysarthria, and left facial droop. Pt reported she fell out of bed and was found 4 hours later by her aide. Code stroke called, no tPA given due to being outside the window and IPH on CTH. Pt endorses a persistent headache spaning from her neck to her forehead. CTh revealed acute intraparenchymal hemorrhage in the posterior right lentiform nucleus.    Hospital Course:     Pt was admitted to the Stroke service for further management. 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 Etiology of pt's ICH is likely 2/2 HTN. Her home Asa was discontinued.     Her hospital course was complicated by UTI, which she was treated with IV Ceftriaxone x 7 days.     Of note, she also had a transient episode of L face twitching. VEEG x 2 days found R PLEDs. She was started on Keppra 500mg BID. No additional seizure events noted for the remainder of her hospital stay.     She was seen by Speech and Swallow, who recommended NPO. A PEG was placed by IR given her esophageal strictures. She is currently tolerating her PEG feeds.   Pt was seen and evaluated by PT/OT, who recommended Acute Rehab. Please continue all meds as directed. Follow-up with Dr. Story as next available. Please repeat EEG in 3 months to re-evaluate need to continue with Keppra. 81 y/o female w/ PMH of HTN, GERD, ETOH abuse and on ASA who presented with left sided weakness, dysarthria, and left facial droop. Pt reported she fell out of bed and was found 4 hours later by her aide. Code stroke called, no tPA given due to being outside the window and IPH on CTH. Pt endorses a persistent headache spaning from her neck to her forehead. CTh revealed acute intraparenchymal hemorrhage in the posterior right lentiform nucleus.    Hospital Course:     Pt was admitted to the Stroke service for further management. 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 Etiology of pt's ICH is likely 2/2 HTN. Her home Asa was discontinued.     Her hospital course was complicated by UTI, which she was treated with IV Ceftriaxone x 7 days.     Of note, she also had a transient episode of L face twitching. VEEG x 2 days found R PLEDs. She was started on Keppra 500mg BID. No additional seizure events noted for the remainder of her hospital stay.     She was seen by Speech and Swallow, who recommended NPO. A PEG was placed by IR given her esophageal strictures. She is currently tolerating her PEG feeds.   Pt was seen and evaluated by PT/OT, who recommended Acute Rehab. Please continue all meds as directed. Follow-up with Dr. Story as next available. Please repeat EEG in 3 months to re-evaluate need to continue with Keppra.   Patient insurance denied acute rehab, will followup with social work for subacute rehab placement for patient. 81 y/o female w/ PMH of HTN, GERD, ETOH abuse and on ASA who presented with left sided weakness, dysarthria, and left facial droop. Pt reported she fell out of bed and was found 4 hours later by her aide. Code stroke called, no tPA given due to being outside the window and IPH on CTH. Pt endorses a persistent headache spaning from her neck to her forehead. CTh revealed acute intraparenchymal hemorrhage in the posterior right lentiform nucleus.    Hospital Course:     Pt was admitted to the Stroke service for further management. 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 Etiology of pt's ICH is likely 2/2 HTN. Her home Asa was discontinued.     Her hospital course was complicated by UTI, which she was treated with IV Ceftriaxone x 7 days.     Of note, she also had a transient episode of L face twitching. VEEG x 2 days found R PLEDs. She was started on Keppra 500mg BID. No additional seizure events noted for the remainder of her hospital stay.     She was seen by Speech and Swallow, who recommended NPO. A PEG was placed by IR, given her esophageal strictures. She is currently tolerating her PEG feeds.   Pt was seen and evaluated by PT/OT, who recommended Rehab.   Please continue all meds as directed. Follow-up with Dr. Story as next available. Please repeat EEG in 3 months to re-evaluate need to continue with Keppra.

## 2017-06-27 NOTE — SWALLOW BEDSIDE ASSESSMENT ADULT - SLP GENERAL OBSERVATIONS
Dysphagia screen failed, Part 3. Pt seen at bedside, awake and alert, cooperative, verbally responsive, Ox3. Pt presents with dysarthria notable for reduced articulatory precision. Pt presents with cognitive-linguistic deficits notable for tangential discourse and short-term memory deficits.

## 2017-06-27 NOTE — OCCUPATIONAL THERAPY INITIAL EVALUATION ADULT - MANUAL MUSCLE TESTING RESULTS, REHAB EVAL
grossly assessed due to/RUE/RLE 3/5 grossly, L shoulder/elbow 2-/5, L wrist/digits 0/5, L ankle 0/5, L hip/knee 2-/5

## 2017-06-27 NOTE — DIETITIAN INITIAL EVALUATION ADULT. - OTHER INFO
Pt seen for: consult for chewing/swallowing difficulty.  Adm dx:  ICH, pt on CIWA protocol   GI issues: no N/V/D  Last BM:  6/26 fecal incontinence   Food allergies: NKFA   Vit/supplement PTA: folic acid

## 2017-06-27 NOTE — DISCHARGE NOTE ADULT - PATIENT PORTAL LINK FT
“You can access the [employer] Patient Portal, offered by [facility], by registering with the following website: [email or web address]”

## 2017-06-27 NOTE — PHYSICAL THERAPY INITIAL EVALUATION ADULT - IMPAIRMENTS CONTRIBUTING TO GAIT DEVIATIONS, PT EVAL
impaired balance/impaired motor control/impaired postural control/narrow base of support/decreased strength

## 2017-06-27 NOTE — DISCHARGE NOTE ADULT - ADDITIONAL INSTRUCTIONS
Jevity 1.2 @ 60 ml/hr x 24 hours/day to provide 1728 calories, 80 g protein: 21 calories/kg, 1.0 g protein/kg of dosing wt 81.6 kg

## 2017-06-27 NOTE — SWALLOW BEDSIDE ASSESSMENT ADULT - SWALLOW EVAL: DIAGNOSIS
Attempted to see pt for swallow evaluation and speech-language evaluation. Chart reviewed. Pt currently off unit for test.  Will attempt to f/u, as time permits. Pt presents with 1) oropharyngeal dysphagia notable for s/s laryngeal penetration/aspiration with most conservative consistencies, 2) dysarthria.

## 2017-06-27 NOTE — PHYSICAL THERAPY INITIAL EVALUATION ADULT - ADDITIONAL COMMENTS
As per pt, pt lives alone, in apt, no steps to enter, elevator access present. PTA, pt ambulatory with cane. HHA 7days, 8 hrs.

## 2017-06-27 NOTE — OCCUPATIONAL THERAPY INITIAL EVALUATION ADULT - DIAGNOSIS, OT EVAL
Pt demonstrated decreased strength, functional mobility, balance,ROM and ADLs Pt demonstrated decreased strength, functional mobility, balance, ROM and ADLs

## 2017-06-27 NOTE — SWALLOW BEDSIDE ASSESSMENT ADULT - COMMENTS
Neuro Exam: Mental Status - is alert, awake, OX3. dysarthric, names, fluent. Cranial Nerves - PERRL, EOMI, VFF, V1-V3 intact, L facial droop, tongue/uvula midline. Motor Exam - RUE 4+/5, LUE 1/5, RLE 4/5 subtle drift, LLE 1/5, nml bulk/tone; Sensory - decreased to LT LUe, LLE  CT C-spine:  No evidence for acute displaced fracture or traumatic malalignment. Cervical degenerative spondylosis, as described above. MRI   can be performed if concern for ligamentous or cord injury, and if no contraindications. Hx cont'd: Neuro Exam: Mental Status - is alert, awake, OX3. dysarthric, names, fluent. Cranial Nerves - PERRL, EOMI, VFF, V1-V3 intact, L facial droop, tongue/uvula midline. Motor Exam - RUE 4+/5, LUE 1/5, RLE 4/5 subtle drift, LLE 1/5, nml bulk/tone; Sensory - decreased to LT LUe, LLE  CT C-spine:  No evidence for acute displaced fracture or traumatic malalignment. Cervical degenerative spondylosis, as described above. MRI   can be performed if concern for ligamentous or cord injury, and if no contraindications.

## 2017-06-27 NOTE — CONSULT NOTE ADULT - SUBJECTIVE AND OBJECTIVE BOX
Patient is a 82y old  Female who presents with a chief complaint of left sided weakness (2017 04:50)      HPI:  Neurology consult    FRANKI [id number]     Patient is a 82y old  Female who presented with a chief complaint of left sided weakness    HPI: 82 F PMH HTN, GERD, EOTH abuse, choly on ASA p/w left side weakness.     CT scan of brain- Acute right intraparenchymal hemorrhage as   described above.    CT cervical spine:  No evidence for acute displaced fracture or traumatic   malalignment. Cervical degenerative spondylosis, as described above. MRI   can be performed if there is concern for ligamentous or cord injury, and   if there are no MRI contraindications          MEDICATIONS    allopurinol, ASA, Citalopram, metoprolol, famotidine, synthroid, atorvastatin      SOCIAL HISTORY:  No history of tobacco or alcohol use     Allergies    No Known Allergies    Intolerances (2017 14:34)      REVIEW OF SYSTEMS: No chest pain, shortness of breath, nausea, vomiting or diarhea.      PAST MEDICAL & SURGICAL HISTORY  ETOH abuse  UTI (lower urinary tract infection)  Dyslipidemia  Gout  Personal history of asbestosis  HTN (hypertension)  MI (myocardial infarction)  History of benign breast tumor  History of left shoulder fracture    FUNCTIONAL HISTORY:   Lives alone  HHA 8h/d to assist w ADLs      FAMILY HISTORY   No pertinent family history in first degree relatives      RECENT LABS/IMAGING  CBC Full  -  ( 2017 07:28 )  WBC Count : 6.34 K/uL  Hemoglobin : 14.4 g/dL  Hematocrit : 44.9 %  Platelet Count - Automated : 122 K/uL  Mean Cell Volume : 106.1 fl  Mean Cell Hemoglobin : 34.0 pg  Mean Cell Hemoglobin Concentration : 32.1 gm/dL  Auto Neutrophil # : x  Auto Lymphocyte # : x  Auto Monocyte # : x  Auto Eosinophil # : x  Auto Basophil # : x  Auto Neutrophil % : x  Auto Lymphocyte % : x  Auto Monocyte % : x  Auto Eosinophil % : x  Auto Basophil % : x        138  |  98  |  10  ----------------------------<  130<H>  4.2   |  24  |  0.74    Ca    9.5      2017 14:08    TPro  7.4  /  Alb  4.0  /  TBili  1.2  /  DBili  x   /  AST  21  /  ALT  14  /  AlkPhos  98      Urinalysis Basic - ( 2017 07:12 )    Color: Yellow / Appearance: x / S.020 / pH: x  Gluc: x / Ketone: Negative  / Bili: Small / Urobili: 2   Blood: x / Protein: 30 mg/dL / Nitrite: Negative   Leuk Esterase: Large / RBC: 3-5 /HPF / WBC 11-25 /HPF   Sq Epi: x / Non Sq Epi: Few /HPF / Bacteria: x        VITALS  T(C): 36.6 (17 @ 08:00), Max: 36.8 (17 @ 20:00)  HR: 87 (17 @ 11:01) (57 - 95)  BP: 137/63 (17 @ 11:01) (110/67 - 189/165)  RR: 19 (17 @ 10:32) (14 - 22)  SpO2: 100% (17 @ 11:01) (94% - 100%)  Wt(kg): --    ALLERGIES  No Known Allergies      MEDICATIONS   hydrALAZINE Injectable 10 milliGRAM(s) IV Push every 6 hours PRN  niCARdipine Infusion 5 mG/Hr IV Continuous <Continuous>  nystatin Cream 1 Application(s) Topical every 8 hours  acetaminophen  IVPB. 1000 milliGRAM(s) IV Intermittent once  acetaminophen  Suppository. 650 milliGRAM(s) Rectal every 6 hours PRN  ondansetron    Tablet 4 milliGRAM(s) Oral every 6 hours PRN  sodium chloride 0.9%. 1000 milliLiter(s) IV Continuous <Continuous>  acetaminophen  IVPB. 1000 milliGRAM(s) IV Intermittent once  cefTRIAXone   IVPB   IV Intermittent       ----------------------------------------------------------------------------------------  PHYSICAL EXAM  Constitutional - NAD, Comfortable  HEENT - NCAT, EOMI  Neck - Supple, No limited ROM  Chest - CTA bilaterally, No wheeze, No rhonchi, No crackles  Cardiovascular - RRR, S1S2, No murmurs  Abdomen - BS+, Soft, NTND  Extremities - No C/C/E, No calf tenderness   Neurologic Exam -                    Cognitive - Awake, Alert, AAO to self, place, date, year, situation     Communication - Fluent, No dysarthria, no aphasia     Cranial Nerves - CN 2-12 intact     Motor - No focal deficits                       Sensory - Intact to LT     Reflexes - DTR Intact, No primitive reflexive     Balance - WNL Static  Psychiatric - Mood stable, Affect WNL      Impression:  83 yo female with CVA with      Plan:  PT- ROM, Bed Mob, Transfers, Amb w AD and bracing as needed  OT- ADLs, bracing  SLP- Dysphagia eval and treat  Prec- Falls, Cardiac  DVT Prophylaxis  Skin- Turn q2 h  Dispo- Patient is a 82y old  Female with a chief complaint of left sided weakness    HPI:  81 yo F with PMH HTN, GERD, EOTH abuse presented to [facility] on 17 with left sided weakness after waking up in AM and falling on floor. CTH showed acute right intraparenchymal hemorrhage. Admitted for neurological monitoring. Pending further workup. BP being monitored closely as suspected cause of ICH.    REVIEW OF SYSTEMS: No chest pain, shortness of breath, nausea, vomiting or diarrhea. Left shoulder pain (2/2 prior injury)    PAST MEDICAL & SURGICAL HISTORY  ETOH abuse  UTI (lower urinary tract infection)  Dyslipidemia  Gout  Personal history of asbestosis  HTN (hypertension)  MI (myocardial infarction)  History of benign breast tumor  History of left shoulder fracture    FUNCTIONAL HISTORY:   Lives alone in co-op, no stairs  HHA 8h/d, 7d/wk to assist w ADLs. Ambulates with cane for long distances and upon awakening.    FUNCTIONAL EXAM:  mod-max A with bed mobility, transfers, max A with gait (2steps), ADLs    FAMILY HISTORY   No pertinent family history in first degree relatives    RECENT LABS/IMAGING  CBC Full  -  ( 2017 07:28 )  WBC Count : 6.34 K/uL  Hemoglobin : 14.4 g/dL  Hematocrit : 44.9 %  Platelet Count - Automated : 122 K/uL  Mean Cell Volume : 106.1 fl  Mean Cell Hemoglobin : 34.0 pg  Mean Cell Hemoglobin Concentration : 32.1 gm/dL  Auto Neutrophil # : x  Auto Lymphocyte # : x  Auto Monocyte # : x  Auto Eosinophil # : x  Auto Basophil # : x  Auto Neutrophil % : x  Auto Lymphocyte % : x  Auto Monocyte % : x  Auto Eosinophil % : x  Auto Basophil % : x        138  |  98  |  10  ----------------------------<  130<H>  4.2   |  24  |  0.74    Ca    9.5      2017 14:08    TPro  7.4  /  Alb  4.0  /  TBili  1.2  /  DBili  x   /  AST  21  /  ALT  14  /  AlkPhos  98      Urinalysis Basic - ( 2017 07:12 )  Color: Yellow / Appearance: x / S.020 / pH: x  Gluc: x / Ketone: Negative  / Bili: Small / Urobili: 2   Blood: x / Protein: 30 mg/dL / Nitrite: Negative   Leuk Esterase: Large / RBC: 3-5 /HPF / WBC 11-25 /HPF   Sq Epi: x / Non Sq Epi: Few /HPF / Bacteria: x    VITALS  T(C): 36.6 (17 @ 08:00), Max: 36.8 (17 @ 20:00)  HR: 87 (17 @ 11:01) (57 - 95)  BP: 137/63 (17 @ 11:01) (110/67 - 189/165)  RR: 19 (17 @ 10:32) (14 - 22)  SpO2: 100% (17 @ 11:01) (94% - 100%)  Wt(kg): --    ALLERGIES  No Known Allergies    MEDICATIONS   hydrALAZINE Injectable 10 milliGRAM(s) IV Push every 6 hours PRN  niCARdipine Infusion 5 mG/Hr IV Continuous <Continuous>  nystatin Cream 1 Application(s) Topical every 8 hours  acetaminophen  IVPB. 1000 milliGRAM(s) IV Intermittent once  acetaminophen  Suppository. 650 milliGRAM(s) Rectal every 6 hours PRN  ondansetron    Tablet 4 milliGRAM(s) Oral every 6 hours PRN  sodium chloride 0.9%. 1000 milliLiter(s) IV Continuous <Continuous>  acetaminophen  IVPB. 1000 milliGRAM(s) IV Intermittent once  cefTRIAXone   IVPB   IV Intermittent     ----------------------------------------------------------------------------------------  PHYSICAL EXAM  Constitutional - NAD, Comfortable  HEENT - NCAT  Neck - Supple, No limited ROM  Chest - CTA bilaterally, No wheeze, No rhonchi, No crackles  Cardiovascular - RRR, S1S2, No murmurs  Abdomen - BS+, Soft, NTND  Extremities - No calf tenderness   Neurologic Exam -                    Cognitive - Awake, Alert, AAO to self, place, date, year, situation. WORLD backwards intact. Repetition intact, difficulty with recall at 3 min (3/3 with hints)     Communication - Fluent, +dysarthria, no aphasia     Cranial Nerves - left facial droop and numbness in V3 area     Motor - 5/5 in RUE and RLE. Left: 1/5 EF, EE, WF, , HF, KF, PF, EHL. 0/5 ShAbd, WE, KE, DF. Spasticity in LUE     Sensory - diminished to LT over LUE and LLE     Reflexes - DTR Intact, +L Babinski     Balance - WNL Static  Psychiatric - Mood stable, Affect WNL    Impression:  81 yo F with right IPH with left hemiparesis, dysarthria    Plan:  PT- ROM, Bed Mob, Transfers, Amb w AD and bracing as needed  OT- ADLs, bracing  SLP- Speech/Dysphagia eval and treat  Prec- Falls  SCDs for DVT Prophylaxis until AC is started as per neuro  Skin- Turn q2 h. Recommend L resting hand splint and PRAFO  Dispo- Recommend acute rehab on dc as pt could tolerate 3 hrs/day therapy for 5 days/wk Patient is a 82y old  Female with a chief complaint of left sided weakness    HPI:  81 yo F with PMH HTN, GERD, EOTH abuse presented to [facility] on 17 with left sided weakness after waking up in AM and falling on floor. CTH showed acute right intraparenchymal hemorrhage. Admitted for neurological monitoring. Pending further workup. BP being monitored closely as suspected cause of ICH.    REVIEW OF SYSTEMS: No chest pain, shortness of breath, nausea, vomiting or diarrhea. Left shoulder pain (2/2 prior injury)    PAST MEDICAL & SURGICAL HISTORY  ETOH abuse  UTI (lower urinary tract infection)  Dyslipidemia  Gout  Personal history of asbestosis  HTN (hypertension)  MI (myocardial infarction)  History of benign breast tumor  History of left shoulder fracture    FUNCTIONAL HISTORY:   Lives alone in co-op, no stairs  HHA 8h/d, 7d/wk to assist w ADLs. Ambulates with cane for long distances and upon awakening.    FUNCTIONAL EXAM:  mod-max A with bed mobility, transfers, max A with gait (2steps), ADLs    FAMILY HISTORY   No pertinent family history in first degree relatives    RECENT LABS/IMAGING  CBC Full  -  ( 2017 07:28 )  WBC Count : 6.34 K/uL  Hemoglobin : 14.4 g/dL  Hematocrit : 44.9 %  Platelet Count - Automated : 122 K/uL  Mean Cell Volume : 106.1 fl  Mean Cell Hemoglobin : 34.0 pg  Mean Cell Hemoglobin Concentration : 32.1 gm/dL  Auto Neutrophil # : x  Auto Lymphocyte # : x  Auto Monocyte # : x  Auto Eosinophil # : x  Auto Basophil # : x  Auto Neutrophil % : x  Auto Lymphocyte % : x  Auto Monocyte % : x  Auto Eosinophil % : x  Auto Basophil % : x        138  |  98  |  10  ----------------------------<  130<H>  4.2   |  24  |  0.74    Ca    9.5      2017 14:08    TPro  7.4  /  Alb  4.0  /  TBili  1.2  /  DBili  x   /  AST  21  /  ALT  14  /  AlkPhos  98      Urinalysis Basic - ( 2017 07:12 )  Color: Yellow / Appearance: x / S.020 / pH: x  Gluc: x / Ketone: Negative  / Bili: Small / Urobili: 2   Blood: x / Protein: 30 mg/dL / Nitrite: Negative   Leuk Esterase: Large / RBC: 3-5 /HPF / WBC 11-25 /HPF   Sq Epi: x / Non Sq Epi: Few /HPF / Bacteria: x    VITALS  T(C): 36.6 (17 @ 08:00), Max: 36.8 (17 @ 20:00)  HR: 87 (17 @ 11:01) (57 - 95)  BP: 137/63 (17 @ 11:01) (110/67 - 189/165)  RR: 19 (17 @ 10:32) (14 - 22)  SpO2: 100% (17 @ 11:01) (94% - 100%)  Wt(kg): --    ALLERGIES  No Known Allergies    MEDICATIONS   hydrALAZINE Injectable 10 milliGRAM(s) IV Push every 6 hours PRN  niCARdipine Infusion 5 mG/Hr IV Continuous <Continuous>  nystatin Cream 1 Application(s) Topical every 8 hours  acetaminophen  IVPB. 1000 milliGRAM(s) IV Intermittent once  acetaminophen  Suppository. 650 milliGRAM(s) Rectal every 6 hours PRN  ondansetron    Tablet 4 milliGRAM(s) Oral every 6 hours PRN  sodium chloride 0.9%. 1000 milliLiter(s) IV Continuous <Continuous>  acetaminophen  IVPB. 1000 milliGRAM(s) IV Intermittent once  cefTRIAXone   IVPB   IV Intermittent     ----------------------------------------------------------------------------------------  PHYSICAL EXAM  Constitutional - NAD, Comfortable  HEENT - NCAT  Neck - Supple, No limited ROM  Chest - CTA bilaterally, No wheeze, No rhonchi, No crackles  Cardiovascular - RRR, S1S2, No murmurs  Abdomen - BS+, Soft, NTND  Extremities - No calf tenderness   Neurologic Exam -                    Cognitive - Awake, Alert, AAO to self, place, date, year, situation. WORLD backwards intact. Repetition intact, difficulty with recall at 3 min (3/3 with hints). Right gaze preference     Communication - Fluent, +dysarthria, no aphasia     Cranial Nerves - left facial droop and numbness in V3 area     Motor - 5/5 in RUE and RLE. Left: 1/5 EF, EE, WF, , HF, KF, PF, EHL. 0/5 ShAbd, WE, KE, DF. Spasticity in LUE     Sensory - diminished to LT over LUE and LLE     Reflexes - DTR Intact, +L Babinski     Balance - WNL Static  Psychiatric - Mood stable, Affect WNL    Impression:  81 yo F with right IPH with left hemiparesis, dysarthria    Plan:  PT- ROM, Bed Mob, Transfers, Amb w AD and bracing as needed  OT- ADLs, bracing  SLP- Speech/Dysphagia eval and treat  Prec- Falls  SCDs for DVT Prophylaxis until AC is started as per neuro  Skin- Turn q2 h. Recommend L resting hand splint and PRAFO  Dispo- Recommend acute rehab on dc as pt could tolerate 3 hrs/day therapy for 5 days/wk

## 2017-06-27 NOTE — DISCHARGE NOTE ADULT - CARE PLAN
Principal Discharge DX:	Intracerebral hemorrhage  Goal:	Prevention of recurrence  Instructions for follow-up, activity and diet:	Please follow up with vascular neurology, Dr. Story Principal Discharge DX:	Intracerebral hemorrhage  Goal:	Prevention of recurrence  Instructions for follow-up, activity and diet:	Please continue all meds as directed and follow up with Dr. Story as next available.  Please repeat EEG in 3 months to re-evaluate need to continue with Keppra.  Please follow-up with your cardiologist Deandre Schmitz as next available. Principal Discharge DX:	Intracerebral hemorrhage  Goal:	Prevention of recurrence  Instructions for follow-up, activity and diet:	Please continue all meds as directed and follow up with Dr. Story as next available.  Please repeat EEG in 3 months to re-evaluate need to continue with Keppra.  Please follow-up with your cardiologist Dr. Carrera as next available.

## 2017-06-28 LAB
ANION GAP SERPL CALC-SCNC: 17 MMOL/L — SIGNIFICANT CHANGE UP (ref 5–17)
BUN SERPL-MCNC: 12 MG/DL — SIGNIFICANT CHANGE UP (ref 7–23)
CALCIUM SERPL-MCNC: 8.7 MG/DL — SIGNIFICANT CHANGE UP (ref 8.4–10.5)
CHLORIDE SERPL-SCNC: 102 MMOL/L — SIGNIFICANT CHANGE UP (ref 96–108)
CO2 SERPL-SCNC: 20 MMOL/L — LOW (ref 22–31)
CREAT SERPL-MCNC: 0.99 MG/DL — SIGNIFICANT CHANGE UP (ref 0.5–1.3)
CULTURE RESULTS: SIGNIFICANT CHANGE UP
GLUCOSE SERPL-MCNC: 101 MG/DL — HIGH (ref 70–99)
HCT VFR BLD CALC: 42.7 % — SIGNIFICANT CHANGE UP (ref 34.5–45)
HGB BLD-MCNC: 13.9 G/DL — SIGNIFICANT CHANGE UP (ref 11.5–15.5)
MCHC RBC-ENTMCNC: 32.6 GM/DL — SIGNIFICANT CHANGE UP (ref 32–36)
MCHC RBC-ENTMCNC: 34.1 PG — HIGH (ref 27–34)
MCV RBC AUTO: 104.7 FL — HIGH (ref 80–100)
PLATELET # BLD AUTO: 157 K/UL — SIGNIFICANT CHANGE UP (ref 150–400)
POTASSIUM SERPL-MCNC: 4.2 MMOL/L — SIGNIFICANT CHANGE UP (ref 3.5–5.3)
POTASSIUM SERPL-SCNC: 4.2 MMOL/L — SIGNIFICANT CHANGE UP (ref 3.5–5.3)
RBC # BLD: 4.08 M/UL — SIGNIFICANT CHANGE UP (ref 3.8–5.2)
RBC # FLD: 15.1 % — HIGH (ref 10.3–14.5)
SODIUM SERPL-SCNC: 139 MMOL/L — SIGNIFICANT CHANGE UP (ref 135–145)
SPECIMEN SOURCE: SIGNIFICANT CHANGE UP
WBC # BLD: 6.82 K/UL — SIGNIFICANT CHANGE UP (ref 3.8–10.5)
WBC # FLD AUTO: 6.82 K/UL — SIGNIFICANT CHANGE UP (ref 3.8–10.5)

## 2017-06-28 PROCEDURE — 99222 1ST HOSP IP/OBS MODERATE 55: CPT | Mod: GC

## 2017-06-28 PROCEDURE — 70549 MR ANGIOGRAPH NECK W/O&W/DYE: CPT | Mod: 26

## 2017-06-28 PROCEDURE — 70553 MRI BRAIN STEM W/O & W/DYE: CPT | Mod: 26

## 2017-06-28 PROCEDURE — 74230 X-RAY XM SWLNG FUNCJ C+: CPT | Mod: 26

## 2017-06-28 RX ORDER — LISINOPRIL 2.5 MG/1
40 TABLET ORAL DAILY
Qty: 0 | Refills: 0 | Status: DISCONTINUED | OUTPATIENT
Start: 2017-06-28 | End: 2017-07-12

## 2017-06-28 RX ORDER — METOPROLOL TARTRATE 50 MG
25 TABLET ORAL DAILY
Qty: 0 | Refills: 0 | Status: DISCONTINUED | OUTPATIENT
Start: 2017-06-28 | End: 2017-07-02

## 2017-06-28 RX ORDER — LEVOTHYROXINE SODIUM 125 MCG
25 TABLET ORAL DAILY
Qty: 0 | Refills: 0 | Status: DISCONTINUED | OUTPATIENT
Start: 2017-06-28 | End: 2017-07-12

## 2017-06-28 RX ORDER — ACETAMINOPHEN 500 MG
1000 TABLET ORAL ONCE
Qty: 0 | Refills: 0 | Status: COMPLETED | OUTPATIENT
Start: 2017-06-28 | End: 2017-06-28

## 2017-06-28 RX ADMIN — Medication 1000 MILLIGRAM(S): at 20:30

## 2017-06-28 RX ADMIN — NYSTATIN CREAM 1 APPLICATION(S): 100000 CREAM TOPICAL at 05:56

## 2017-06-28 RX ADMIN — NYSTATIN CREAM 1 APPLICATION(S): 100000 CREAM TOPICAL at 15:13

## 2017-06-28 RX ADMIN — CEFTRIAXONE 100 GRAM(S): 500 INJECTION, POWDER, FOR SOLUTION INTRAMUSCULAR; INTRAVENOUS at 10:36

## 2017-06-28 RX ADMIN — Medication 25 MICROGRAM(S): at 15:19

## 2017-06-28 RX ADMIN — Medication 400 MILLIGRAM(S): at 20:00

## 2017-06-28 RX ADMIN — NYSTATIN CREAM 1 APPLICATION(S): 100000 CREAM TOPICAL at 21:42

## 2017-06-28 RX ADMIN — Medication 10 MILLIGRAM(S): at 18:23

## 2017-06-28 NOTE — SWALLOW VFSS/MBS ASSESSMENT ADULT - DIAGNOSTIC IMPRESSIONS
Pt presents with an oropharyngeal dysphagia notable for reduced oral management, uncontrolled bolus spillover, delay in pharyngeal trigger, and silent laryngeal penetration with thin and thick puree, honey-thick and nectar-thick liquids.

## 2017-06-28 NOTE — SWALLOW VFSS/MBS ASSESSMENT ADULT - ADDITIONAL INFORMATION
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

## 2017-06-28 NOTE — SPEECH LANGUAGE PATHOLOGY EVALUATION - SLP EXECUTIVE FUNCTION DEFICITS NOTED
organization/impulsivity/insight/awareness/inhibition/mental flexibility/self-monitoring
impulsivity/organization/insight/awareness/planning/self-monitoring/mental flexibility/inhibition

## 2017-06-28 NOTE — SPEECH LANGUAGE PATHOLOGY EVALUATION - SLP COMPREHENSION
intact for 6/6 words, able to follow 3/3 written commands (but noted initially with reduced task comprehension)

## 2017-06-28 NOTE — SWALLOW VFSS/MBS ASSESSMENT ADULT - MODE OF PRESENTATION
spoon/fed by clinician fed by clinician/spoon spoon/self fed/cup/fed by clinician fed by clinician/cup/spoon/self fed

## 2017-06-28 NOTE — SWALLOW VFSS/MBS ASSESSMENT ADULT - SLP GENERAL OBSERVATIONS
Pt received in radiology, secure in AMY chair. Pt awake and alert, cooperative, following simple directions for the purposes of the exam.

## 2017-06-28 NOTE — SPEECH LANGUAGE PATHOLOGY EVALUATION - SLP DIAGNOSIS
Pt presents with dysarthria notable for reduced articulatory precision Pt presents with dysarthria notable for reduced articulatory precision and cognitive-linguistics notable for impairments in attention, executive functioning and working memory.

## 2017-06-28 NOTE — SPEECH LANGUAGE PATHOLOGY EVALUATION - SLP PERTINENT HISTORY OF CURRENT PROBLEM
82 F PMH HTN, GERD, EOTH abuse, choly on ASA p/w left side weakness, left facial droop, dysarthria. Code Stroke 13:47. LNK 1 am. Pt woke up this am tried to get out of bed and fell to floor, Aide noted L side weakness, dysarthria. Code stroke called. Pt poor historian, limited hx, Endorses HA, Nausea and L sided numbness. At Baseline walks with cane, aide helps with ADLs. CTH: Acute R intraparenchymal hemorrhage just lateral to R thalamic body. NIHSS 11, MRS 3 Dysphagia pending ICH vol 4 ml, ICH score 1.
82 F PMH HTN, GERD, EOTH abuse, choly on ASA p/w left side weakness, left facial droop, dysarthria. Code Stroke 13:47. LNK 1 am. Pt woke up this am tried to get out of bed and fell to floor, Aide noted L side weakness, dysarthria. Code stroke called. Pt poor historian, limited hx, Endorses HA, Nausea and L sided numbness. At Baseline walks with cane, aide helps with ADLs. CTH: Acute R intraparenchymal hemorrhage just lateral to R thalamic body. NIHSS 11, MRS 3 Dysphagia pending ICH vol 4 ml, ICH score 1.

## 2017-06-28 NOTE — PROGRESS NOTE ADULT - ASSESSMENT
ASSESSMENT: This is a 82y woman with HTN presents with R BG presumed hypertensive hemorrhage. Impression: R hypertensive ICH.     NEURO: Continue close monitoring for neurologic deterioration, titrate to normotension, titrate statin to LDL goal less than 70, MRI Brain w/o, MRA Head w/o and Neck w/contrast pending. Physical therapy/OT recommend AR.     ANTITHROMBOTIC THERAPY: none in setting of IPH    PULMONARY:  protecting airway, saturating well     CARDIOVASCULAR:  cardiac monitoring without events, will resume home BP meds when s/s eval complete and have po access                            SBP goal: normotension    GASTROINTESTINAL:  dysphagia screen by SPL failed, MBS done, failed, will discuss with family re: NGT and PEG, patient does not seem to retain explanations given and not understanding why she can't eat.  GI called for consult for possible PEG.  Additionally, will get Palliative Consult to discuss GOC.      Diet: NPO    RENAL: BUN/Cr stable, good urine output      Na Goal: Greater than 135     Basurto: No    HEMATOLOGY: H/H stable, Platelets 157, no s/s of bleeding     DVT ppx: no chemical tx with IPH, venodynes in place     ID: afebrile, no leukocytosis, no s/s of infection     OTHER:     DISPOSITION: Rehab or home depending on PT eval once stable and workup is complete     PT/OT:  AR     PMR: agrees with AR     Swallow: failed bedside, failed MBS    CORE MEASURES:        Admission NIHSS:      TPA:  NO      LDL/HDL: p     Depression Screen: 0     Statin Therapy: not in setting of IPH     Dysphagia Screen:  FAIL     Smoking NO      Afib  NO     [employer] Education  YES

## 2017-06-28 NOTE — SPEECH LANGUAGE PATHOLOGY EVALUATION - SLP GENERAL OBSERVATIONS
Pt seen at bedside, awake and alert, cooperative, Ox3.
Pt seen at bedside, awake and alert, cooperative, Ox3.

## 2017-06-28 NOTE — SPEECH LANGUAGE PATHOLOGY EVALUATION - SLP REASONING
poor understanding of explanations of swallowing recommendations, despite repetitions and rephrasing.

## 2017-06-28 NOTE — SPEECH LANGUAGE PATHOLOGY EVALUATION - SLP ORIENTATION
Oriented to person, place, time, and reason for hospitalization.
Oriented to person, place, and reason for hospitalization, approximately to time.

## 2017-06-28 NOTE — SPEECH LANGUAGE PATHOLOGY EVALUATION - RATE
rapid rate of speech, which occasionally impairs intelligibility
increased rate of production, which occasionally affects intelligibility

## 2017-06-28 NOTE — PROGRESS NOTE ADULT - SUBJECTIVE AND OBJECTIVE BOX
THE PATIENT WAS SEEN AND EXAMINED BY ME WITH THE HOUSESTAFF AND STROKE TEAM DURING MORNING ROUNDS.   HPI: a 82y old  woman with PMH HTN, GERD, ETOH abuse and on ASA who presented with a chief complaint of left sided weakness.  Code stroke called, no tPA given due to IPH on CTH.  At baseline walks with cane and has aide at home to assist with ADLs      SUBJECTIVE: No events overnight.  No new neurologic complaints.      hydrALAZINE Injectable 10 milliGRAM(s) IV Push every 6 hours PRN  nystatin Cream 1 Application(s) Topical every 8 hours  acetaminophen  Suppository. 650 milliGRAM(s) Rectal every 6 hours PRN  ondansetron    Tablet 4 milliGRAM(s) Oral every 6 hours PRN  sodium chloride 0.9%. 1000 milliLiter(s) IV Continuous <Continuous>  cefTRIAXone   IVPB   IV Intermittent   cefTRIAXone   IVPB 1 Gram(s) IV Intermittent every 24 hours  metoprolol succinate ER 25 milliGRAM(s) Oral daily  lisinopril 40 milliGRAM(s) Oral daily  levothyroxine Injectable 25 MICROGram(s) IV Push daily      PHYSICAL EXAM:   Vital Signs Last 24 Hrs  T(C): 36.7 (28 Jun 2017 16:00), Max: 36.8 (27 Jun 2017 20:00)  T(F): 98.1 (28 Jun 2017 16:00), Max: 98.2 (27 Jun 2017 20:00)  HR: 68 (28 Jun 2017 16:00) (68 - 93)  BP: 138/110 (28 Jun 2017 16:00) (113/64 - 171/86)  BP(mean): 120 (28 Jun 2017 16:00) (78 - 120)  RR: 14 (28 Jun 2017 16:00) (14 - 24)  SpO2: 66% (28 Jun 2017 16:00) (66% - 100%)    General: No acute distress  HEENT: EOM intact, visual fields full  Abdomen: Soft, nontender, nondistended   Extremities: No edema    NEUROLOGICAL EXAM:  Mental status: Awake, alert, oriented name, age and hospital, no aphasia, no neglect, perseverates in telling stories   Cranial Nerves: Left facial asymmetry, no nystagmus, moderate to severe dysarthria, dysphagia   Motor exam: Normal tone, no drift, 5/5 RUE, 5/5 RLE, Left hemiplegia  Sensation: Diminished on left side   Coordination/ Gait: No dysmetria, gait not assessed on bedrest     LABS:                        13.9   6.82  )-----------( 157      ( 28 Jun 2017 07:17 )             42.7    06-28    139  |  102  |  12  ----------------------------<  101<H>  4.2   |  20<L>  |  0.99    Ca    8.7      28 Jun 2017 07:35  Phos  3.2     06-27  Mg     1.7     06-27    TPro  6.6  /  Alb  3.4  /  TBili  1.2  /  DBili  0.2  /  AST  30  /  ALT  12  /  AlkPhos  84  06-27        IMAGING: Reviewed by me.    CT Head No Cont (06.26.17 @ 14:29) >CT CERVICAL SPINE CT Head No Cont (06.26.17 @ 14:29) > Noncontrast CT brain: Acute right intraparenchymal hemorrhage  Acute intraparenchymal hemorrhage just lateral to the right thalamic body which measures 2.7 x 1.5 x 1.9 cm (AP x TV x CC). There is no midline shift or central herniation. CT cervicalspine:  No evidence for acute displaced fracture or traumatic malalignment. Cervical degenerative spondylosis, as described above.   CT Head No Cont (06.27.17 @ 11:39) >Grossly stable 2.9 x 1.9 cm parenchymal hemorrhage in the region of the posterior right lentiform nucleus.  No midline shift or hydrocephalus.

## 2017-06-29 ENCOUNTER — RESULT REVIEW (OUTPATIENT)
Age: 82
End: 2017-06-29

## 2017-06-29 LAB
ANION GAP SERPL CALC-SCNC: 14 MMOL/L — SIGNIFICANT CHANGE UP (ref 5–17)
BUN SERPL-MCNC: 10 MG/DL — SIGNIFICANT CHANGE UP (ref 7–23)
CALCIUM SERPL-MCNC: 8.3 MG/DL — LOW (ref 8.4–10.5)
CHLORIDE SERPL-SCNC: 105 MMOL/L — SIGNIFICANT CHANGE UP (ref 96–108)
CHOLEST SERPL-MCNC: 182 MG/DL — SIGNIFICANT CHANGE UP (ref 10–199)
CO2 SERPL-SCNC: 20 MMOL/L — LOW (ref 22–31)
CREAT SERPL-MCNC: 0.69 MG/DL — SIGNIFICANT CHANGE UP (ref 0.5–1.3)
GLUCOSE SERPL-MCNC: 96 MG/DL — SIGNIFICANT CHANGE UP (ref 70–99)
HCT VFR BLD CALC: 42 % — SIGNIFICANT CHANGE UP (ref 34.5–45)
HDLC SERPL-MCNC: 71 MG/DL — SIGNIFICANT CHANGE UP (ref 40–125)
HGB BLD-MCNC: 13.5 G/DL — SIGNIFICANT CHANGE UP (ref 11.5–15.5)
LIPID PNL WITH DIRECT LDL SERPL: 86 MG/DL — SIGNIFICANT CHANGE UP
MAGNESIUM SERPL-MCNC: 1.7 MG/DL — SIGNIFICANT CHANGE UP (ref 1.6–2.6)
MCHC RBC-ENTMCNC: 32.1 GM/DL — SIGNIFICANT CHANGE UP (ref 32–36)
MCHC RBC-ENTMCNC: 34.1 PG — HIGH (ref 27–34)
MCV RBC AUTO: 106.1 FL — HIGH (ref 80–100)
PHOSPHATE SERPL-MCNC: 2.8 MG/DL — SIGNIFICANT CHANGE UP (ref 2.5–4.5)
PLATELET # BLD AUTO: 149 K/UL — LOW (ref 150–400)
POTASSIUM SERPL-MCNC: 3.7 MMOL/L — SIGNIFICANT CHANGE UP (ref 3.5–5.3)
POTASSIUM SERPL-SCNC: 3.7 MMOL/L — SIGNIFICANT CHANGE UP (ref 3.5–5.3)
RBC # BLD: 3.96 M/UL — SIGNIFICANT CHANGE UP (ref 3.8–5.2)
RBC # FLD: 15.2 % — HIGH (ref 10.3–14.5)
SODIUM SERPL-SCNC: 139 MMOL/L — SIGNIFICANT CHANGE UP (ref 135–145)
TOTAL CHOLESTEROL/HDL RATIO MEASUREMENT: 2.6 RATIO — LOW (ref 3.3–7.1)
TRIGL SERPL-MCNC: 125 MG/DL — SIGNIFICANT CHANGE UP (ref 10–149)
WBC # BLD: 6.07 K/UL — SIGNIFICANT CHANGE UP (ref 3.8–10.5)
WBC # FLD AUTO: 6.07 K/UL — SIGNIFICANT CHANGE UP (ref 3.8–10.5)

## 2017-06-29 PROCEDURE — 99232 SBSQ HOSP IP/OBS MODERATE 35: CPT

## 2017-06-29 PROCEDURE — 44360 SMALL BOWEL ENDOSCOPY: CPT

## 2017-06-29 PROCEDURE — 93010 ELECTROCARDIOGRAM REPORT: CPT

## 2017-06-29 PROCEDURE — 99222 1ST HOSP IP/OBS MODERATE 55: CPT | Mod: 25

## 2017-06-29 PROCEDURE — 88305 TISSUE EXAM BY PATHOLOGIST: CPT | Mod: 26

## 2017-06-29 RX ORDER — SODIUM CHLORIDE 9 MG/ML
1000 INJECTION, SOLUTION INTRAVENOUS
Qty: 0 | Refills: 0 | Status: DISCONTINUED | OUTPATIENT
Start: 2017-06-29 | End: 2017-07-02

## 2017-06-29 RX ORDER — ENOXAPARIN SODIUM 100 MG/ML
40 INJECTION SUBCUTANEOUS DAILY
Qty: 0 | Refills: 0 | Status: DISCONTINUED | OUTPATIENT
Start: 2017-06-29 | End: 2017-07-12

## 2017-06-29 RX ORDER — ONDANSETRON 8 MG/1
4 TABLET, FILM COATED ORAL ONCE
Qty: 0 | Refills: 0 | Status: COMPLETED | OUTPATIENT
Start: 2017-06-29 | End: 2017-06-29

## 2017-06-29 RX ORDER — ACETAMINOPHEN 500 MG
1000 TABLET ORAL ONCE
Qty: 0 | Refills: 0 | Status: COMPLETED | OUTPATIENT
Start: 2017-06-29 | End: 2017-06-29

## 2017-06-29 RX ADMIN — ONDANSETRON 4 MILLIGRAM(S): 8 TABLET, FILM COATED ORAL at 23:54

## 2017-06-29 RX ADMIN — NYSTATIN CREAM 1 APPLICATION(S): 100000 CREAM TOPICAL at 15:15

## 2017-06-29 RX ADMIN — Medication 25 MICROGRAM(S): at 05:33

## 2017-06-29 RX ADMIN — NYSTATIN CREAM 1 APPLICATION(S): 100000 CREAM TOPICAL at 05:42

## 2017-06-29 RX ADMIN — Medication 10 MILLIGRAM(S): at 16:31

## 2017-06-29 RX ADMIN — Medication 10 MILLIGRAM(S): at 22:10

## 2017-06-29 RX ADMIN — Medication 400 MILLIGRAM(S): at 06:00

## 2017-06-29 RX ADMIN — ENOXAPARIN SODIUM 40 MILLIGRAM(S): 100 INJECTION SUBCUTANEOUS at 15:13

## 2017-06-29 RX ADMIN — Medication 400 MILLIGRAM(S): at 15:00

## 2017-06-29 RX ADMIN — Medication 1000 MILLIGRAM(S): at 15:30

## 2017-06-29 RX ADMIN — Medication 10 MILLIGRAM(S): at 07:14

## 2017-06-29 RX ADMIN — Medication 1000 MILLIGRAM(S): at 06:30

## 2017-06-29 RX ADMIN — CEFTRIAXONE 100 GRAM(S): 500 INJECTION, POWDER, FOR SOLUTION INTRAMUSCULAR; INTRAVENOUS at 09:55

## 2017-06-29 RX ADMIN — NYSTATIN CREAM 1 APPLICATION(S): 100000 CREAM TOPICAL at 23:10

## 2017-06-29 NOTE — PROGRESS NOTE ADULT - SUBJECTIVE AND OBJECTIVE BOX
THE PATIENT WAS SEEN AND EXAMINED BY ME WITH THE HOUSESTAFF AND STROKE TEAM DURING MORNING ROUNDS.   HPI:a 82y old  woman with PMH HTN, GERD, ETOH abuse and on ASA who presented with a chief complaint of left sided weakness.  Code stroke called, no tPA given due to IPH on CTH.  At baseline walks with cane and has aide at home to assist with ADLs    SUBJECTIVE: No events overnight.  Continues to complain of headache.      hydrALAZINE Injectable 10 milliGRAM(s) IV Push every 6 hours PRN  nystatin Cream 1 Application(s) Topical every 8 hours  acetaminophen  Suppository. 650 milliGRAM(s) Rectal every 6 hours PRN  ondansetron    Tablet 4 milliGRAM(s) Oral every 6 hours PRN  sodium chloride 0.9%. 1000 milliLiter(s) IV Continuous <Continuous>  cefTRIAXone   IVPB   IV Intermittent   cefTRIAXone   IVPB 1 Gram(s) IV Intermittent every 24 hours  metoprolol succinate ER 25 milliGRAM(s) Oral daily  lisinopril 40 milliGRAM(s) Oral daily  levothyroxine Injectable 25 MICROGram(s) IV Push daily  enoxaparin Injectable 40 milliGRAM(s) SubCutaneous daily  dextrose 5% + sodium chloride 0.45%. 1000 milliLiter(s) IV Continuous <Continuous>      PHYSICAL EXAM:   Vital Signs Last 24 Hrs  T(C): 36.8 (29 Jun 2017 08:00), Max: 37.1 (29 Jun 2017 04:00)  T(F): 98.3 (29 Jun 2017 08:00), Max: 98.7 (29 Jun 2017 04:00)  HR: 75 (29 Jun 2017 10:00) (68 - 93)  BP: 159/73 (29 Jun 2017 10:00) (138/67 - 179/108)  BP(mean): 100 (29 Jun 2017 10:00) (84 - 125)  RR: 17 (29 Jun 2017 10:00) (11 - 20)  SpO2: 94% (29 Jun 2017 10:00) (47% - 100%)    General: No acute distress  HEENT: EOM intact, visual fields full  Abdomen: Soft, nontender, nondistended   Extremities: No edema    NEUROLOGICAL EXAM:  Mental status: Awake, alert, oriented name, age and hospital, no aphasia, no neglect, perseverates in telling stories   Cranial Nerves: Left facial asymmetry, no nystagmus, moderate to severe dysarthria, dysphagia   Motor exam: Normal tone, no drift, 5/5 RUE, 5/5 RLE, Left hemiplegia  Sensation: Diminished on left side   Coordination/ Gait: No dysmetria, gait not assessed on bedrest         LABS:                        13.5   6.07  )-----------( 149      ( 29 Jun 2017 07:08 )             42.0    06-29    139  |  105  |  10  ----------------------------<  96  3.7   |  20<L>  |  0.69    Ca    8.3<L>      29 Jun 2017 04:51  Phos  2.8     06-29  Mg     1.7     06-29          IMAGING: Reviewed by me.   CT Head No Cont (06.26.17 @ 14:29) >CT CERVICAL SPINE CT Head No Cont (06.26.17 @ 14:29) > Noncontrast CT brain: Acute right intraparenchymal hemorrhage  Acute intraparenchymal hemorrhage just lateral to the right thalamic body which measures 2.7 x 1.5 x 1.9 cm (AP x TV x CC). There is no midline shift or central herniation. CT cervicalspine:  No evidence for acute displaced fracture or traumatic malalignment. Cervical degenerative spondylosis, as described above.   CT Head No Cont (06.27.17 @ 11:39) >Grossly stable 2.9 x 1.9 cm parenchymal hemorrhage in the region of the posterior right lentiform nucleus.  No midline shift or hydrocephalus.< from: MRA Head w/o Cont (06.28.17 @ 23:37) > Right thalamocapsular region hemorrhage as seen on the prior CT. No abnormal enhancement to suggest an underlying lesion however recommend repeat study post resolution of the hemorrhage for better evaluation.  Suspicion of a moderate stenosis approximately 2 cm distal to the origin of the right internal carotid artery. Atherosclerotic irregularity at the level of the left internal carotid artery is suggested. Recommend correlation with other noninvasive vascular evaluation. Consider CTA   and/or Doppler to better quantify the degree of stenosis .

## 2017-06-29 NOTE — CONSULT NOTE ADULT - ASSESSMENT
Impression:  1. Dysphagia- secondary to CVA  2. CVA- cleared by neuro for anesthesia and PEG placement    Plan:  1. Plan for PEG placement today pending scheduling  2. Keep NPO  Risks, benefits, alternatives described to patient including, but not limited to, bleeding, infection, organ damage, perforation, missed lesions and risks of anesthesia. Patient understands and agrees to these risks.

## 2017-06-29 NOTE — PROGRESS NOTE ADULT - ASSESSMENT
ASSESSMENT: This is a 82y woman with HTN presents with R BG presumed hypertensive hemorrhage. Impression: R hypertensive ICH.     NEURO: Continue close monitoring for neurologic deterioration, titrate to normotension, titrate statin to LDL goal less than 70, MRI Brain w/o, MRA Head w/o and Neck w/contrast pending. Physical therapy/OT recommend AR.     ANTITHROMBOTIC THERAPY: none in setting of IPH    PULMONARY:  protecting airway, saturating well     CARDIOVASCULAR:  cardiac monitoring without events, will resume home BP meds when s/s eval complete and have po access                            SBP goal: normotension    GASTROINTESTINAL:  dysphagia screen by SPL failed, MBS done, failed, will discuss with family re: NGT and PEG, patient does not seem to retain explanations given and not understanding why she can't eat.  GI attempted PEG and unable to pass scope due to esophageal stricture and recommended IR placement of G tube.  CT of Abdomen ordered and IR called.  Additionally, will get Palliative Consult to discuss GOC pending outcome of feeding tube placement, whether it can be done by IR or not.      Diet: NPO    RENAL: BUN/Cr stable, good urine output      Na Goal: Greater than 135     Basurto: No    HEMATOLOGY: H/H stable, Platelets 149, no s/s of bleeding     DVT ppx: no chemical tx with IPH, venodynes in place     ID: afebrile, no leukocytosis, no s/s of infection     OTHER:     DISPOSITION: Rehab or home depending on PT eval once stable and workup is complete     PT/OT:  AR     PMR: agrees with AR     Swallow: failed bedside, failed MBS, pending G tube placement    CORE MEASURES:        Admission NIHSS:      TPA:  NO      LDL/HDL:  86/71     Depression Screen: 0     Statin Therapy: not in setting of IPH     Dysphagia Screen:  FAIL     Smoking NO      Afib  NO     [employer] Education  YES

## 2017-06-29 NOTE — CHART NOTE - NSCHARTNOTEFT_GEN_A_CORE
Mrs Scott witnessed clenching right arm to body, and left eye and mouth twitching for about a minute by NP Quyen, and RN Collette. Patient was able to communicate immediately after this episode, and reported that she doesn't recall having facial twitching. Given that she was back to baseline there will be no interventions at this time, but will be monitored closely. /82 (hydralazine 10mg given) 69, 100% on room air, 17.     Plan:  1. Routine VEEG ordered.  2. Close monitoring

## 2017-06-29 NOTE — CONSULT NOTE ADULT - SUBJECTIVE AND OBJECTIVE BOX
CHIEF COMPLAINT:    HPI  82 year old Female with Pmed hx as mentioned below who presents with a chief complaint of left sided weakness s/p fall at home            MEDICATIONS    allopurinol, ASA, Citalopram, metoprolol, famotidine, synthroid, atorvastatin      SOCIAL HISTORY:  No history of tobacco or alcohol use     Allergies    No Known Allergies    Intolerances (26 Jun 2017 14:34)      PAST MEDICAL & SURGICAL HISTORY:  ETOH abuse  UTI (lower urinary tract infection)  Dyslipidemia  Gout  Personal history of asbestosis  HTN (hypertension)  MI (myocardial infarction)  History of benign breast tumor  History of left shoulder fracture          PREVIOUS DIAGNOSTIC TESTING:    [ ] Echocardiogram:  [ ]  Catheterization:  [ ] Stress Test:  	    MEDICATIONS:  MEDICATIONS  (STANDING):  nystatin Cream 1 Application(s) Topical every 8 hours  sodium chloride 0.9%. 1000 milliLiter(s) (75 mL/Hr) IV Continuous <Continuous>  cefTRIAXone   IVPB   IV Intermittent   cefTRIAXone   IVPB 1 Gram(s) IV Intermittent every 24 hours  metoprolol succinate ER 25 milliGRAM(s) Oral daily  lisinopril 40 milliGRAM(s) Oral daily  levothyroxine Injectable 25 MICROGram(s) IV Push daily  enoxaparin Injectable 40 milliGRAM(s) SubCutaneous daily      FAMILY HISTORY:  No pertinent family history in first degree relatives      SOCIAL HISTORY:    [ ] Non-smoker  [ ] Smoker  [ ] Alcohol    Allergies    No Known Allergies    Intolerances    	    REVIEW OF SYSTEMS:  CONSTITUTIONAL: No fever, weight loss, or fatigue  EYES: No eye pain, visual disturbances, or discharge  ENMT:  No difficulty hearing, tinnitus, vertigo; No sinus or throat pain  NECK: No pain or stiffness  RESPIRATORY: No cough, wheezing, chills or hemoptysis; No Shortness of Breath  CARDIOVASCULAR: No chest pain, palpitations, passing out, dizziness, or leg swelling  GASTROINTESTINAL: No abdominal or epigastric pain. No nausea, vomiting, or hematemesis; No diarrhea or constipation. No melena or hematochezia.  GENITOURINARY: No dysuria, frequency, hematuria, or incontinence  NEUROLOGICAL: No headaches, memory loss, loss of strength, numbness, or tremors  SKIN: No itching, burning, rashes, or lesions   	    [ ] All others negative	  [ ] Unable to obtain    PHYSICAL EXAM:  T(C): 37.1 (06-29-17 @ 04:00), Max: 37.1 (06-29-17 @ 04:00)  HR: 70 (06-29-17 @ 07:00) (68 - 93)  BP: 173/93 (06-29-17 @ 07:00) (138/67 - 179/108)  RR: 18 (06-29-17 @ 07:00) (11 - 24)  SpO2: 100% (06-29-17 @ 07:00) (47% - 100%)  Wt(kg): --  I&O's Summary    28 Jun 2017 07:01  -  29 Jun 2017 07:00  --------------------------------------------------------  IN: 1100 mL / OUT: 0 mL / NET: 1100 mL        Appearance: Normal	  Psychiatry: A & O x 3, Mood & affect appropriate  HEENT:   Normal oral mucosa, PERRL, EOMI	  Lymphatic: No lymphadenopathy  Cardiovascular: Normal S1 S2,RRR, No JVD, No murmurs  Respiratory: Lungs clear to auscultation	  Gastrointestinal:  Soft, Non-tender, + BS	  Skin: No rashes, No ecchymoses, No cyanosis	  Neurologic: Non-focal  Extremities: Normal range of motion, No clubbing, cyanosis or edema  Vascular: Peripheral pulses palpable 2+ bilaterally    TELEMETRY: 	    ECG:  	  RADIOLOGY:  OTHER: 	  	  LABS:	 	    CARDIAC MARKERS:                                  13.5   6.07  )-----------( 149      ( 29 Jun 2017 07:08 )             42.0     06-29    139  |  105  |  10  ----------------------------<  96  3.7   |  20<L>  |  0.69    Ca    8.3<L>      29 Jun 2017 04:51  Phos  2.8     06-29  Mg     1.7     06-29        proBNP:   Lipid Profile:   HgA1c:   TSH: CHIEF COMPLAINT: left sided weakness/ fall at home     HPI  82 year old Female with Pmed/ surg hx as mentioned below who admitted with acute CVA / UTI/   now dysphagia. Patient is a poor historian. Resting comfortably. Patient denies of any recent chest pain, sob, palpitations, orthopnea, PND, abd pain, or edema. ROS otherwise negative.       PAST MEDICAL & SURGICAL HISTORY:  ETOH abuse  UTI (lower urinary tract infection)  Dyslipidemia  Gout  Personal history of asbestosis  HTN (hypertension)  MI (myocardial infarction)  History of benign breast tumor  History of left shoulder fracture          PREVIOUS DIAGNOSTIC TESTING:    [X ] Echocardiogram:  < from: Transthoracic Echocardiogram (02.24.15 @ 16:38) >  EF (Bryannatz): 64 %  ------------------------------------------------------------------------    Conclusions:  1. Mitral annular calcification, otherwise normal mitral  valve. Mild mitral regurgitation.  2. Normal left ventricular internal dimensions and wall  thicknesses.  3. Normal left ventricular systolic function. No segmental  wall motion abnormalities.  4. Normal right ventricular size and function.  *** Compared with echocardiogram of 5/13/2013, no  significant changes noted.  ------------------------------------------------------------------------    [ ]  Catheterization:    [x ] Stress Test:  	  < from: Nuclear Stress Test, Pharmacologic (05.21.12 @ 00:00) >  ------------------------------------------------------------------------  IMPRESSIONS:Normal Study  * The left ventricle was normal in size.  * Tracer uptake was homogeneous throughout the left  ventricle.  * Normal study; no evidence for myocardial infarction or  ischemia.  * Gated wall motion analysis was performed, and shows  normal wall motion.* Chest Pain: No chest pain with  administration of Regadenoson.  * Symptom: No Symptom.  * HR Response: Appropriate.  * BP Response: Appropriate.  * Heart Rhythm: Sinus Rhythm - 55 BPM.  * ECG Abnormalities: None.  * Arrhythmia: None.  * Review of raw data shows: The study is of good technical  quality.  * The left ventricle was normal in size. Normal myocardial  perfusion scan, with no evidence of infarction or  inducible ischemia.  * Gated wall motion analysis is performed, and shows  normal wall motion with post stress LVEF of 74% and post  stress LVEDV of  57 ml.  * No previous Nuclear/Stress exam.  ------------------------------------------------------------------------        HOME MEDICATIONS   * Patient Currently Takes Medications as of 24-Feb-2015 00:56 documented in Prescription Writer  aspirin 81 mg oral delayed release tablet: 1 tab(s) orally once a day  metoprolol succinate 25 mg oral tablet, extended release: 1 tab(s) orally once a day  hydrALAZINE 10 mg oral tablet: 1 tab(s) orally every 8 hours  lisinopril 40 mg oral tablet: 1 tab(s) orally once a day  Pepcid 20 mg oral tablet:  orally once a day  folic acid 1 mg oral tablet: 1 tab(s) orally once a day  levothyroxine 100 mcg (0.1 mg) oral tablet: 1 tab(s) orally once a day  LORazepam 1 mg oral tablet: 1 tab(s) orally 4 times a day  Cipro 500 mg oral tablet: 1 tab(s) orally every 12 hours  x 7 days        MEDICATIONS:  MEDICATIONS  (STANDING):  nystatin Cream 1 Application(s) Topical every 8 hours  sodium chloride 0.9%. 1000 milliLiter(s) (75 mL/Hr) IV Continuous <Continuous>  cefTRIAXone   IVPB   IV Intermittent   cefTRIAXone   IVPB 1 Gram(s) IV Intermittent every 24 hours  metoprolol succinate ER 25 milliGRAM(s) Oral daily  lisinopril 40 milliGRAM(s) Oral daily  levothyroxine Injectable 25 MICROGram(s) IV Push daily  enoxaparin Injectable 40 milliGRAM(s) SubCutaneous daily      FAMILY HISTORY:  No pertinent family history in first degree relatives      SOCIAL HISTORY:    [ x] Non-smoker  [ ] Smoker  [x ] Alcohol    Allergies    No Known Allergies    Intolerances    	    REVIEW OF SYSTEMS:  CONSTITUTIONAL: No fever, weight loss, or fatigue  EYES: No eye pain, visual disturbances, or discharge  ENMT:  No difficulty hearing, tinnitus, vertigo; No sinus or throat pain  NECK: No pain or stiffness  RESPIRATORY: No cough, wheezing, chills or hemoptysis; No Shortness of Breath  CARDIOVASCULAR: No chest pain, palpitations, passing out, dizziness, or leg swelling  GASTROINTESTINAL: No abdominal or epigastric pain. No nausea, vomiting, or hematemesis; No diarrhea or constipation. No melena or hematochezia.  GENITOURINARY: No dysuria, frequency, hematuria, or incontinence  NEUROLOGICAL: No headaches, memory loss, loss of strength, numbness, or tremors  SKIN: No itching, burning, rashes, or lesions   	    [ x] All others negative	  [ ] Unable to obtain    PHYSICAL EXAM:  T(C): 37.1 (06-29-17 @ 04:00), Max: 37.1 (06-29-17 @ 04:00)  HR: 70 (06-29-17 @ 07:00) (68 - 93)  BP: 173/93 (06-29-17 @ 07:00) (138/67 - 179/108)  RR: 18 (06-29-17 @ 07:00) (11 - 24)  SpO2: 100% (06-29-17 @ 07:00) (47% - 100%)  Wt(kg): --  I&O's Summary    28 Jun 2017 07:01  -  29 Jun 2017 07:00  --------------------------------------------------------  IN: 1100 mL / OUT: 0 mL / NET: 1100 mL        Appearance: Normal	  Psychiatry: A & O x 3, Mood & affect appropriate  HEENT:   Normal oral mucosa, PERRL, EOMI	  Lymphatic: No lymphadenopathy  Cardiovascular: Normal S1 S2,RRR, No JVD, + murmurs  Respiratory: Lungs clear to auscultation	  Gastrointestinal:  Soft, Non-tender, + BS	  Skin: No rashes, No ecchymoses, No cyanosis	  Neurologic: Non-focal  Extremities: +1-2 bl  edema  Vascular: Peripheral pulses palpable 2+ bilaterally    TELEMETRY: NSR 	    ECG:  	pending   RADIOLOGY:    EXAM:  MRA NECK W & W O CONTRAST                          EXAM:  MRA HEAD W O CONTRAST                          EXAM:  MRI BRAIN WO W CONTRAST                            PROCEDURE DATE:  06/28/2017    IMPRESSION:      1. Right thalamocapsular region hemorrhage as seen on the prior CT. No   abnormal enhancement to suggest an underlying lesion however recommend   repeat study post resolution of the hemorrhage for better evaluation    2. Suspicion of a moderate stenosis approximately 2 cm distal to the   origin of the right internal carotid artery. Atherosclerotic irregularity   at the level of the left internal carotid artery is suggested. Recommend   correlation with other noninvasive vascular evaluation. Consider CTA   and/or Doppler to better quantify the degree of stenosis .      -------------------------------------------------------------------------------------------------------    < from: CT Head No Cont (06.27.17 @ 11:39) >    IMPRESSION: Grossly stable 2.9 x 1.9 cm parenchymal hemorrhage in the   region of the posterior right lentiform nucleus.  No midline shift or hydrocephalus.                OTHER: 	  	  LABS:	 	    CARDIAC MARKERS:                            13.5   6.07  )-----------( 149      ( 29 Jun 2017 07:08 )             42.0     06-29    139  |  105  |  10  ----------------------------<  96  3.7   |  20<L>  |  0.69    Ca    8.3<L>      29 Jun 2017 04:51  Phos  2.8     06-29  Mg     1.7     06-29        proBNP:   Lipid Profile:   HgA1c:   TSH: CHIEF COMPLAINT: left sided weakness/ fall at home     HPI  82 year old Female with Pmed/ surg hx as mentioned below who admitted with acute CVA / UTI/   now dysphagia. Patient is a poor historian. Information mostly obtained by beside RN and h&p.  Resting comfortably. Patient denies of any recent chest pain, sob, palpitations, orthopnea, PND, abd pain, or edema. ROS otherwise negative.       PAST MEDICAL & SURGICAL HISTORY:  ETOH abuse  UTI (lower urinary tract infection)  Dyslipidemia  Gout  Personal history of asbestosis  HTN (hypertension)  MI (myocardial infarction)  History of benign breast tumor  History of left shoulder fracture          PREVIOUS DIAGNOSTIC TESTING:    [X ] Echocardiogram:  < from: Transthoracic Echocardiogram (02.24.15 @ 16:38) >  EF (Bryannatz): 64 %  ------------------------------------------------------------------------    Conclusions:  1. Mitral annular calcification, otherwise normal mitral  valve. Mild mitral regurgitation.  2. Normal left ventricular internal dimensions and wall  thicknesses.  3. Normal left ventricular systolic function. No segmental  wall motion abnormalities.  4. Normal right ventricular size and function.  *** Compared with echocardiogram of 5/13/2013, no  significant changes noted.  ------------------------------------------------------------------------    [ ]  Catheterization:    [x ] Stress Test:  	  < from: Nuclear Stress Test, Pharmacologic (05.21.12 @ 00:00) >  ------------------------------------------------------------------------  IMPRESSIONS:Normal Study  * The left ventricle was normal in size.  * Tracer uptake was homogeneous throughout the left  ventricle.  * Normal study; no evidence for myocardial infarction or  ischemia.  * Gated wall motion analysis was performed, and shows  normal wall motion.* Chest Pain: No chest pain with  administration of Regadenoson.  * Symptom: No Symptom.  * HR Response: Appropriate.  * BP Response: Appropriate.  * Heart Rhythm: Sinus Rhythm - 55 BPM.  * ECG Abnormalities: None.  * Arrhythmia: None.  * Review of raw data shows: The study is of good technical  quality.  * The left ventricle was normal in size. Normal myocardial  perfusion scan, with no evidence of infarction or  inducible ischemia.  * Gated wall motion analysis is performed, and shows  normal wall motion with post stress LVEF of 74% and post  stress LVEDV of  57 ml.  * No previous Nuclear/Stress exam.  ------------------------------------------------------------------------        HOME MEDICATIONS   * Patient Currently Takes Medications as of 24-Feb-2015 00:56 documented in Prescription Writer  aspirin 81 mg oral delayed release tablet: 1 tab(s) orally once a day  metoprolol succinate 25 mg oral tablet, extended release: 1 tab(s) orally once a day  hydrALAZINE 10 mg oral tablet: 1 tab(s) orally every 8 hours  lisinopril 40 mg oral tablet: 1 tab(s) orally once a day  Pepcid 20 mg oral tablet:  orally once a day  folic acid 1 mg oral tablet: 1 tab(s) orally once a day  levothyroxine 100 mcg (0.1 mg) oral tablet: 1 tab(s) orally once a day  LORazepam 1 mg oral tablet: 1 tab(s) orally 4 times a day  Cipro 500 mg oral tablet: 1 tab(s) orally every 12 hours  x 7 days        MEDICATIONS:  MEDICATIONS  (STANDING):  nystatin Cream 1 Application(s) Topical every 8 hours  sodium chloride 0.9%. 1000 milliLiter(s) (75 mL/Hr) IV Continuous <Continuous>  cefTRIAXone   IVPB   IV Intermittent   cefTRIAXone   IVPB 1 Gram(s) IV Intermittent every 24 hours  metoprolol succinate ER 25 milliGRAM(s) Oral daily  lisinopril 40 milliGRAM(s) Oral daily  levothyroxine Injectable 25 MICROGram(s) IV Push daily  enoxaparin Injectable 40 milliGRAM(s) SubCutaneous daily      FAMILY HISTORY:  No pertinent family history in first degree relatives      SOCIAL HISTORY:    [ x] Non-smoker  [ ] Smoker  [x ] Alcohol    Allergies    No Known Allergies    Intolerances    	    REVIEW OF SYSTEMS:  CONSTITUTIONAL: No fever, weight loss, or fatigue  EYES: No eye pain, visual disturbances, or discharge  ENMT:  No difficulty hearing, tinnitus, vertigo; No sinus or throat pain  NECK: No pain or stiffness  RESPIRATORY: No cough, wheezing, chills or hemoptysis; No Shortness of Breath  CARDIOVASCULAR: No chest pain, palpitations, passing out, dizziness, or leg swelling  GASTROINTESTINAL: No abdominal or epigastric pain. No nausea, vomiting, or hematemesis; No diarrhea or constipation. No melena or hematochezia.  GENITOURINARY: No dysuria, frequency, hematuria, or incontinence  NEUROLOGICAL: No headaches, memory loss, loss of strength, numbness, or tremors  SKIN: No itching, burning, rashes, or lesions   	    [ x] All others negative	  [ ] Unable to obtain    PHYSICAL EXAM:  T(C): 37.1 (06-29-17 @ 04:00), Max: 37.1 (06-29-17 @ 04:00)  HR: 70 (06-29-17 @ 07:00) (68 - 93)  BP: 173/93 (06-29-17 @ 07:00) (138/67 - 179/108)  RR: 18 (06-29-17 @ 07:00) (11 - 24)  SpO2: 100% (06-29-17 @ 07:00) (47% - 100%)  Wt(kg): --  I&O's Summary    28 Jun 2017 07:01  -  29 Jun 2017 07:00  --------------------------------------------------------  IN: 1100 mL / OUT: 0 mL / NET: 1100 mL        Appearance: Normal	  Psychiatry: A & O x 3, Mood & affect appropriate  HEENT:   Normal oral mucosa, PERRL, EOMI	  Lymphatic: No lymphadenopathy  Cardiovascular: Normal S1 S2,RRR, No JVD, + murmurs  Respiratory: Lungs clear to auscultation	  Gastrointestinal:  Soft, Non-tender, + BS	  Skin: No rashes, No ecchymoses, No cyanosis	  Neurologic: Non-focal  Extremities: +1-2 bl  edema  Vascular: Peripheral pulses palpable 2+ bilaterally    TELEMETRY: NSR 	    ECG:  	pending   RADIOLOGY:    EXAM:  MRA NECK W & W O CONTRAST                          EXAM:  MRA HEAD W O CONTRAST                          EXAM:  MRI BRAIN WO W CONTRAST                            PROCEDURE DATE:  06/28/2017    IMPRESSION:      1. Right thalamocapsular region hemorrhage as seen on the prior CT. No   abnormal enhancement to suggest an underlying lesion however recommend   repeat study post resolution of the hemorrhage for better evaluation    2. Suspicion of a moderate stenosis approximately 2 cm distal to the   origin of the right internal carotid artery. Atherosclerotic irregularity   at the level of the left internal carotid artery is suggested. Recommend   correlation with other noninvasive vascular evaluation. Consider CTA   and/or Doppler to better quantify the degree of stenosis .      -------------------------------------------------------------------------------------------------------    < from: CT Head No Cont (06.27.17 @ 11:39) >    IMPRESSION: Grossly stable 2.9 x 1.9 cm parenchymal hemorrhage in the   region of the posterior right lentiform nucleus.  No midline shift or hydrocephalus.                OTHER: 	  	  LABS:	 	    CARDIAC MARKERS:                            13.5   6.07  )-----------( 149      ( 29 Jun 2017 07:08 )             42.0     06-29    139  |  105  |  10  ----------------------------<  96  3.7   |  20<L>  |  0.69    Ca    8.3<L>      29 Jun 2017 04:51  Phos  2.8     06-29  Mg     1.7     06-29        proBNP:   Lipid Profile:   HgA1c:   TSH:

## 2017-06-29 NOTE — CONSULT NOTE ADULT - ATTENDING COMMENTS
83 yo F pmh etoh abuse, HTN, on ASA who presented with hemorrhagic stroke.  Now failed S&S evaluation.  Willing to consider PEG tube for feeds.    Impression:  1) Stroke  2) Dysphagia with failed S&S  3) h/o ASA use  4) h/o etoh use    Plan:  1) EGD with PEG placement today  2) Will need abx ppx (we will order)  3) NPO  4) Risks/benefits of procedure discussed at length with patient
Patient seen and examined.  Agree with above NP note.    cv/hd stable  acute hemorrhagic CVA with residual deficit  no cp/sob/chf  tele sr  optimized for peg today with low cardiac risk  bp parameters per neuro

## 2017-06-29 NOTE — CONSULT NOTE ADULT - ASSESSMENT
82 year old Female with HTN/MI/ ETOH abuse admitted with acute CVA / UTI/   now dysphagia     1. cv stable no chest pain or sob.  no evidence of acute ischemia/ACS  no events on tele   No decompensated CHF on exam   check TTE to evaluate for valvular disease   pending EKG   patient optimize from a cv standpoint to proceed for peg placement with Low cardiac risk    2. Acute hemorrhagic CVA   presumed hypertensive hemorrhage  Neuro f/u     3. HTN; chronic    goal for BP < 160/90 per neuro  current BP acceptable   resume home BP meds when able to tolerate PO   continue with Hydralazine IVP PRN     4. Dysphagia   f.u   plan for Peg placements     5.UTI   med f/u   continue with IV abx     6. DVT PPX 82 year old Female with HTN/MI/ ETOH abuse admitted with acute CVA / UTI/   now dysphagia     1. cv stable no chest pain or sob.  no evidence of acute ischemia/ACS  no events on tele   No decompensated CHF on exam   pending EKG   patient optimize from a cv standpoint to proceed for peg placement with Low cardiac risk    2. Acute hemorrhagic CVA   presumed hypertensive hemorrhage  Neuro f/u     3. HTN; chronic    goal for BP < 160/90 per neuro  current BP acceptable   resume home BP meds when able to tolerate PO   continue with Hydralazine IVP PRN     4. Dysphagia   f.u   plan for Peg placements     5.UTI   med f/u   continue with IV abx     6. DVT PPX 82 year old Female with HTN/MI/ ETOH abuse admitted with acute CVA / UTI/   now dysphagia     1. cv stable no chest pain or sob.  no evidence of acute ischemia/ACS  no events on tele   No decompensated CHF on exam   pending EKG   patient optimize from a cv standpoint to proceed for peg placement with Low cardiac risk    2. Acute hemorrhagic CVA   presumed hypertensive hemorrhage  Neuro f/u     3. HTN; chronic    goal for BP < 160/90 per neuro  current BP acceptable   resume home BP meds when able to tolerate PO   continue with Hydralazine IVP PRN     4. Dysphagia  GI f.u   plan for Peg placements     5.UTI   med f/u   continue with IV abx     6. DVT PPX

## 2017-06-29 NOTE — CONSULT NOTE ADULT - SUBJECTIVE AND OBJECTIVE BOX
Chief Complaint:  Patient is a 82y old  Female who presents with a chief complaint of left sided weakness (27 Jun 2017 04:50)      HPI: 82y old  woman with PMH HTN, GERD, ETOH abuse and on ASA who presented with a chief complaint of left sided weakness.  Code stroke called, no tPA given due to IPH on CTH.  At baseline walks with cane and has aide at home to assist with ADLs. She had MBS yesterday which speech and swallow are recommending PEG. Pt and family agreeable to PEG placement. She is refusing NGT placement.      Allergies:  No Known Allergies          Hospital Medications:  hydrALAZINE Injectable 10 milliGRAM(s) IV Push every 6 hours PRN  nystatin Cream 1 Application(s) Topical every 8 hours  acetaminophen  Suppository. 650 milliGRAM(s) Rectal every 6 hours PRN  ondansetron    Tablet 4 milliGRAM(s) Oral every 6 hours PRN  sodium chloride 0.9%. 1000 milliLiter(s) IV Continuous <Continuous>  cefTRIAXone   IVPB   IV Intermittent   cefTRIAXone   IVPB 1 Gram(s) IV Intermittent every 24 hours  metoprolol succinate ER 25 milliGRAM(s) Oral daily  lisinopril 40 milliGRAM(s) Oral daily  levothyroxine Injectable 25 MICROGram(s) IV Push daily  enoxaparin Injectable 40 milliGRAM(s) SubCutaneous daily      PMHX/PSHX:  ETOH abuse  UTI (lower urinary tract infection)  Dyslipidemia  Gout  Personal history of asbestosis  HTN (hypertension)  MI (myocardial infarction)  History of benign breast tumor  History of left shoulder fracture      Family history:  No pertinent family history in first degree relatives      Social History:     ROS:     General:  No wt loss, fevers, chills,   Eyes:  Good vision,   ENT:  No sore throat, pain, dysphagia, odynophagia  CV:  No pain, palpitations, hypo/hypertension  Resp:  No dyspnea, cough, tachypnea, wheezing  GI:  See HPI  :  No pain, bleeding,   Muscle:  No pain, weakness  Neuro:  No weakness,  memory problems  Psych:  No insomnia, mood problems, depression  Heme:  No petechiae, ecchymosis, easy bruisability  Skin:  No rash, edema      PHYSICAL EXAM:     GENERAL:  No distress  HEENT:  Conjunctivae clear and pink,  no JVD  CHEST:  Full & symmetric excursion, no increased effort, breath sounds clear  HEART:  Regular rhythm, S1, S2, no murmur/rub/S3/S4, no abdominal bruit, no edema  ABDOMEN:  Soft, non-tender, non-distended, normoactive bowel sounds,  no masses ,  EXTREMITIES:  no cyanosis,clubbing or edema  SKIN:  No rash/erythema/warm/dry  NEURO:  Alert,    Vital Signs:  Vital Signs Last 24 Hrs  T(C): 37.1 (29 Jun 2017 04:00), Max: 37.1 (29 Jun 2017 04:00)  T(F): 98.7 (29 Jun 2017 04:00), Max: 98.7 (29 Jun 2017 04:00)  HR: 70 (29 Jun 2017 07:00) (68 - 93)  BP: 173/93 (29 Jun 2017 07:00) (138/67 - 179/108)  BP(mean): 97 (29 Jun 2017 07:00) (84 - 125)  RR: 18 (29 Jun 2017 07:00) (11 - 24)  SpO2: 100% (29 Jun 2017 07:00) (47% - 100%)  Daily     Daily     LABS:                        13.9   6.82  )-----------( 157      ( 28 Jun 2017 07:17 )             42.7     06-29    139  |  105  |  10  ----------------------------<  96  3.7   |  20<L>  |  0.69    Ca    8.3<L>      29 Jun 2017 04:51  Phos  2.8     06-29  Mg     1.7     06-29                Imaging:      < from: CT Head No Cont (06.27.17 @ 11:39) >  Grossly stable 2.9 x 1.9 cm parenchymal hemorrhage in the   region of the posterior right lentiform nucleus.    No midline shift or hydrocephalus.    < end of copied text >

## 2017-06-29 NOTE — PROGRESS NOTE ADULT - SUBJECTIVE AND OBJECTIVE BOX
Pre-Endoscopy Evaluation      Referring Physician:   Fred Story MD                                Procedure: EGD/Peg placement    Indication for Procedure: Dysphagia    Pertinent History: 81 yo female with hx below admitted with acute CVA now dysphagia, failed swallow eval    Sedation by Anesthesia [x]    PAST MEDICAL & SURGICAL HISTORY:  ETOH abuse  UTI (lower urinary tract infection)  Dyslipidemia  Gout  Personal history of asbestosis  HTN (hypertension)  MI (myocardial infarction)  History of benign breast tumor  History of left shoulder fracture      PMH of Gastroparesis [ ]  Gastric Surgery [ ]  Gastric Outlet Obstruction [ ] no    Allergies    No Known Allergies      Latex allergy: [ ] yes [ x] no    Medications:MEDICATIONS  (STANDING):  nystatin Cream 1 Application(s) Topical every 8 hours  sodium chloride 0.9%. 1000 milliLiter(s) (75 mL/Hr) IV Continuous <Continuous>  cefTRIAXone   IVPB   IV Intermittent   cefTRIAXone   IVPB 1 Gram(s) IV Intermittent every 24 hours  metoprolol succinate ER 25 milliGRAM(s) Oral daily  lisinopril 40 milliGRAM(s) Oral daily  levothyroxine Injectable 25 MICROGram(s) IV Push daily  enoxaparin Injectable 40 milliGRAM(s) SubCutaneous daily    MEDICATIONS  (PRN):  hydrALAZINE Injectable 10 milliGRAM(s) IV Push every 6 hours PRN Systolic/Diastolic >160/90  acetaminophen  Suppository. 650 milliGRAM(s) Rectal every 6 hours PRN Mild Pain (1 - 3)  ondansetron    Tablet 4 milliGRAM(s) Oral every 6 hours PRN Nausea and/or Vomiting      Smoking: [ ] yes  [x] no    AICD/PPM: [ ] yes   [x ] no    Pertinent lab data:                        13.5   6.07  )-----------( 149      ( 29 Jun 2017 07:08 )             42.0     06-29    139  |  105  |  10  ----------------------------<  96  3.7   |  20<L>  |  0.69    Ca    8.3<L>      29 Jun 2017 04:51  Phos  2.8     06-29  Mg     1.7     06-29      Physical Examination:     Daily   Vital Signs Last 24 Hrs  T(C): 37.1 (29 Jun 2017 04:00), Max: 37.1 (29 Jun 2017 04:00)  T(F): 98.7 (29 Jun 2017 04:00), Max: 98.7 (29 Jun 2017 04:00)  HR: 70 (29 Jun 2017 07:00) (68 - 93)  BP: 173/93 (29 Jun 2017 07:00) (138/67 - 179/108)  BP(mean): 97 (29 Jun 2017 07:00) (84 - 125)  RR: 18 (29 Jun 2017 07:00) (11 - 24)  SpO2: 100% (29 Jun 2017 07:00) (47% - 100%)    BP:                 HR:                  SPO2:               Temperature:    Constitutional: NAD    HEENT: Atraumatic      Neck:  No JVD    Respiratory: CTAB/L    Cardiovascular: S1 and S2    Gastrointestinal: BS+, soft, NT/ND    Extremities: No peripheral edema    Neurological: Awake, alert    Comments:    ASA Class: I [ ]  II [ ]  III [ ]  IV [ ]    The patient is a suitable candidate for the planned procedure unless box checked [ ]  No, explain:

## 2017-06-29 NOTE — PROGRESS NOTE ADULT - SUBJECTIVE AND OBJECTIVE BOX
Patient with no new issues.  Tolerating PT/OT  No pain    Vital Signs Last 24 Hrs  T(C): 36.8 (29 Jun 2017 08:00), Max: 37.1 (29 Jun 2017 04:00)  T(F): 98.3 (29 Jun 2017 08:00), Max: 98.7 (29 Jun 2017 04:00)  HR: 75 (29 Jun 2017 10:00) (68 - 93)  BP: 159/73 (29 Jun 2017 10:00) (138/67 - 179/108)  BP(mean): 100 (29 Jun 2017 10:00) (84 - 125)  RR: 17 (29 Jun 2017 10:00) (11 - 24)  SpO2: 94% (29 Jun 2017 10:00) (47% - 100%)  ----------------------------------------------------------------------------------------  PHYSICAL EXAM  Constitutional - NAD, Comfortable  HEENT - NCAT  Neck - Supple, No limited ROM  Chest - CTA bilaterally, No wheeze, No rhonchi, No crackles  Cardiovascular - RRR, S1S2, No murmurs  Abdomen - BS+, Soft, NTND  Extremities - No calf tenderness   Neurologic Exam -                    Cognitive - Awake, Alert, AAO to self, place, date, year, situation.      Communication - Fluent, +dysarthria, perseverative speech     Motor - 5/5 in RUE and RLE. Left: 1/5 EF, EE, WF, , HF, KF, PF, EHL. 0/5 ShAbd, WE, KE, DF. Spasticity in LUE     Sensory - diminished to LT over LUE and LLE     Reflexes - DTR Intact, +L Babinski     Balance - WNL Static  Psychiatric - Mood stable, Affect WNL    Impression:  81 yo F with right IPH with left hemiparesis, dysarthria    Plan:  PT- ROM, Bed Mob, Transfers, Amb w AD and bracing as needed  OT- ADLs, bracing  SLP- Speech/Dysphagia eval and treat  Prec- Falls  SCDs for DVT Prophylaxis until AC is started as per neuro  Skin- Turn q2 h. Recommend L resting hand splint and PRAFO  Dispo- Recommend acute rehab on dc as pt could tolerate 3 hrs/day therapy for 5 days/wk

## 2017-06-30 DIAGNOSIS — F10.20 ALCOHOL DEPENDENCE, UNCOMPLICATED: ICD-10-CM

## 2017-06-30 LAB
ANION GAP SERPL CALC-SCNC: 17 MMOL/L — SIGNIFICANT CHANGE UP (ref 5–17)
BUN SERPL-MCNC: 7 MG/DL — SIGNIFICANT CHANGE UP (ref 7–23)
CALCIUM SERPL-MCNC: 8.2 MG/DL — LOW (ref 8.4–10.5)
CHLORIDE SERPL-SCNC: 104 MMOL/L — SIGNIFICANT CHANGE UP (ref 96–108)
CO2 SERPL-SCNC: 19 MMOL/L — LOW (ref 22–31)
CREAT SERPL-MCNC: 0.7 MG/DL — SIGNIFICANT CHANGE UP (ref 0.5–1.3)
GLUCOSE SERPL-MCNC: 96 MG/DL — SIGNIFICANT CHANGE UP (ref 70–99)
HCT VFR BLD CALC: 42 % — SIGNIFICANT CHANGE UP (ref 34.5–45)
HGB BLD-MCNC: 13.4 G/DL — SIGNIFICANT CHANGE UP (ref 11.5–15.5)
MCHC RBC-ENTMCNC: 31.9 GM/DL — LOW (ref 32–36)
MCHC RBC-ENTMCNC: 33.5 PG — SIGNIFICANT CHANGE UP (ref 27–34)
MCV RBC AUTO: 105 FL — HIGH (ref 80–100)
PLATELET # BLD AUTO: 158 K/UL — SIGNIFICANT CHANGE UP (ref 150–400)
POTASSIUM SERPL-MCNC: 3.5 MMOL/L — SIGNIFICANT CHANGE UP (ref 3.5–5.3)
POTASSIUM SERPL-SCNC: 3.5 MMOL/L — SIGNIFICANT CHANGE UP (ref 3.5–5.3)
RBC # BLD: 4 M/UL — SIGNIFICANT CHANGE UP (ref 3.8–5.2)
RBC # FLD: 15.1 % — HIGH (ref 10.3–14.5)
SODIUM SERPL-SCNC: 140 MMOL/L — SIGNIFICANT CHANGE UP (ref 135–145)
WBC # BLD: 5.11 K/UL — SIGNIFICANT CHANGE UP (ref 3.8–10.5)
WBC # FLD AUTO: 5.11 K/UL — SIGNIFICANT CHANGE UP (ref 3.8–10.5)

## 2017-06-30 PROCEDURE — 99222 1ST HOSP IP/OBS MODERATE 55: CPT

## 2017-06-30 PROCEDURE — 99232 SBSQ HOSP IP/OBS MODERATE 35: CPT

## 2017-06-30 PROCEDURE — 71010: CPT | Mod: 26

## 2017-06-30 RX ORDER — ACETAMINOPHEN 500 MG
1000 TABLET ORAL ONCE
Qty: 0 | Refills: 0 | Status: COMPLETED | OUTPATIENT
Start: 2017-06-30 | End: 2017-06-30

## 2017-06-30 RX ORDER — ONDANSETRON 8 MG/1
1 TABLET, FILM COATED ORAL
Qty: 0 | Refills: 0 | COMMUNITY
Start: 2017-06-30

## 2017-06-30 RX ORDER — ATORVASTATIN CALCIUM 80 MG/1
1 TABLET, FILM COATED ORAL
Qty: 0 | Refills: 0 | COMMUNITY

## 2017-06-30 RX ORDER — NYSTATIN CREAM 100000 [USP'U]/G
1 CREAM TOPICAL
Qty: 0 | Refills: 0 | COMMUNITY
Start: 2017-06-30

## 2017-06-30 RX ORDER — METOPROLOL TARTRATE 50 MG
1 TABLET ORAL
Qty: 0 | Refills: 0 | COMMUNITY
Start: 2017-06-30

## 2017-06-30 RX ORDER — LISINOPRIL 2.5 MG/1
1 TABLET ORAL
Qty: 0 | Refills: 0 | COMMUNITY
Start: 2017-06-30

## 2017-06-30 RX ADMIN — Medication 650 MILLIGRAM(S): at 05:08

## 2017-06-30 RX ADMIN — Medication 10 MILLIGRAM(S): at 22:25

## 2017-06-30 RX ADMIN — Medication 10 MILLIGRAM(S): at 12:02

## 2017-06-30 RX ADMIN — SODIUM CHLORIDE 75 MILLILITER(S): 9 INJECTION, SOLUTION INTRAVENOUS at 22:20

## 2017-06-30 RX ADMIN — NYSTATIN CREAM 1 APPLICATION(S): 100000 CREAM TOPICAL at 14:00

## 2017-06-30 RX ADMIN — Medication 10 MILLIGRAM(S): at 22:16

## 2017-06-30 RX ADMIN — NYSTATIN CREAM 1 APPLICATION(S): 100000 CREAM TOPICAL at 22:18

## 2017-06-30 RX ADMIN — NYSTATIN CREAM 1 APPLICATION(S): 100000 CREAM TOPICAL at 04:47

## 2017-06-30 RX ADMIN — ENOXAPARIN SODIUM 40 MILLIGRAM(S): 100 INJECTION SUBCUTANEOUS at 13:42

## 2017-06-30 RX ADMIN — Medication 400 MILLIGRAM(S): at 22:16

## 2017-06-30 RX ADMIN — CEFTRIAXONE 100 GRAM(S): 500 INJECTION, POWDER, FOR SOLUTION INTRAMUSCULAR; INTRAVENOUS at 12:42

## 2017-06-30 RX ADMIN — Medication 25 MICROGRAM(S): at 04:46

## 2017-06-30 NOTE — PROGRESS NOTE ADULT - ASSESSMENT
ASSESSMENT: This is a 82y woman with HTN presents with R BG presumed hypertensive hemorrhage. Impression: R hypertensive ICH.     NEURO: Continue close monitoring for neurologic deterioration, titrate to normotension, titrate statin to LDL goal less than 70, Physical therapy/OT recommend AR, continue current rehab treatment.     ANTITHROMBOTIC THERAPY: none in setting of IPH    PULMONARY:  protecting airway, saturating well     CARDIOVASCULAR:  cardiac monitoring without events, will resume home BP meds when s/s eval complete and have po access                            SBP goal: normotension    GASTROINTESTINAL:  dysphagia screen by SPL failed, MBS done, failed, discussed with family re: NGT and PEG, patient does not seem to retain explanations given and not understanding why she can't eat.  GI attempted PEG and unable to pass scope due to esophageal stricture and recommended IR placement of PEG tube.  CT of Abdomen ordered, pending official report, and IR made aware.  Additionally, will get Palliative Consult to discuss GOC pending outcome of feeding tube placement, whether it can be done by IR or not.      Diet: NPO    RENAL: BUN/Cr stable, good urine output      Na Goal: Greater than 135     Basurto: No    HEMATOLOGY: H/H stable, Platelets 149, no s/s of bleeding     DVT ppx: LMWH    ID: afebrile, no leukocytosis, continue ceftriaxone for UTI (4/7 days)    OTHER:     DISPOSITION: AR as per PMR eval once stable and workup is complete     CORE MEASURES:        Admission NIHSS:      TPA:  NO      LDL/HDL:  86/71     Depression Screen: 0     Statin Therapy: not in setting of IPH     Dysphagia Screen:  FAIL     Smoking NO      Afib  NO     [employer]  YES ASSESSMENT: This is a 82y woman with HTN presents with R BG presumed hypertensive hemorrhage. Impression: R hypertensive ICH.     NEURO: Episode of facial twitching overnight, EEG ordered, Continue close monitoring for neurologic deterioration, titrate to normotension, titrate statin to LDL goal less than 70, Physical therapy/OT recommend AR, continue current rehab treatment.     ANTITHROMBOTIC THERAPY: none in setting of IPH    PULMONARY:  protecting airway, saturating well     CARDIOVASCULAR:  cardiac monitoring without events, will resume home BP meds when s/s eval complete and have po access                            SBP goal: normotension    GASTROINTESTINAL:  dysphagia screen by SPL failed, MBS done, failed, discussed with family re: NGT and PEG, patient does not seem to retain explanations given and not understanding why she can't eat.  GI attempted PEG and unable to pass scope due to esophageal stricture and recommended IR placement of PEG tube.  CT of Abdomen ordered, pending official report, IR made aware.  Additionally, will get Palliative Consult to discuss GOC pending outcome of feeding tube placement, whether it can be done by IR or not.      Diet: NPO    RENAL: BUN/Cr stable, good urine output      Na Goal: Greater than 135     Basurto: No    HEMATOLOGY: H/H stable, Platelets 149, no s/s of bleeding     DVT ppx: LMWH    ID: afebrile, no leukocytosis, continue ceftriaxone for UTI (4/7 days)    OTHER:     DISPOSITION: AR as per PMR eval once stable and workup is complete     CORE MEASURES:        Admission NIHSS:      TPA:  NO      LDL/HDL:  86/71     Depression Screen: 0     Statin Therapy: not in setting of IPH     Dysphagia Screen:  FAIL     Smoking NO      Afib  NO     [employer]  YES ASSESSMENT: This is a 82y woman with HTN presents with R BG presumed hypertensive hemorrhage. Impression: R hypertensive ICH.     NEURO: Episode of facial twitching overnight, EEG ordered, Continue close monitoring for neurologic deterioration, titrate to normotension, titrate statin to LDL goal less than 70, hx of ETOH abuse, psych contacted  for further recommendations, Physical therapy/OT recommend AR, continue current rehab treatment.     ANTITHROMBOTIC THERAPY: none in setting of IPH    PULMONARY:  protecting airway, saturating well     CARDIOVASCULAR:  cardiac monitoring without events, will resume home BP meds when s/s eval complete and have po access                            SBP goal: normotension    GASTROINTESTINAL:  dysphagia screen by SPL failed, MBS done, failed, discussed with family re: NGT and PEG, patient does not seem to retain explanations given and not understanding why she can't eat.  GI attempted PEG and unable to pass scope due to esophageal stricture and recommended IR placement of PEG tube.  CT of Abdomen ordered, pending official report, IR made aware.  Additionally, will get Palliative Consult to discuss GOC pending outcome of feeding tube placement, whether it can be done by IR or not.      Diet: NPO    RENAL: BUN/Cr stable, good urine output      Na Goal: Greater than 135     Basurto: No    HEMATOLOGY: H/H stable, Platelets 149, no s/s of bleeding     DVT ppx: LMWH    ID: afebrile, no leukocytosis, continue ceftriaxone for UTI (4/7 days)    OTHER:     DISPOSITION: AR as per PMR eval once stable and workup is complete     CORE MEASURES:        Admission NIHSS:      TPA:  NO      LDL/HDL:  86/71     Depression Screen: 0     Statin Therapy: not in setting of IPH     Dysphagia Screen:  FAIL     Smoking NO      Afib  NO     [employer]  YES

## 2017-06-30 NOTE — PROGRESS NOTE ADULT - SUBJECTIVE AND OBJECTIVE BOX
Tolerating PT/OT  Pain controlled.  No CP, No SOB    MEDICATIONS  (STANDING):  nystatin Cream 1 Application(s) Topical every 8 hours  cefTRIAXone   IVPB   IV Intermittent   cefTRIAXone   IVPB 1 Gram(s) IV Intermittent every 24 hours  metoprolol succinate ER 25 milliGRAM(s) Oral daily  lisinopril 40 milliGRAM(s) Oral daily  levothyroxine Injectable 25 MICROGram(s) IV Push daily  enoxaparin Injectable 40 milliGRAM(s) SubCutaneous daily  dextrose 5% + sodium chloride 0.45%. 1000 milliLiter(s) (75 mL/Hr) IV Continuous <Continuous>    MEDICATIONS  (PRN):  hydrALAZINE Injectable 10 milliGRAM(s) IV Push every 6 hours PRN Systolic/Diastolic >160/90  acetaminophen  Suppository. 650 milliGRAM(s) Rectal every 6 hours PRN Mild Pain (1 - 3)  ondansetron    Tablet 4 milliGRAM(s) Oral every 6 hours PRN Nausea and/or Vomiting    Vital Signs Last 24 Hrs  T(C): 36.7 (30 Jun 2017 08:00), Max: 36.9 (29 Jun 2017 20:00)  T(F): 98 (30 Jun 2017 08:00), Max: 98.4 (29 Jun 2017 20:00)  HR: 82 (30 Jun 2017 12:00) (63 - 82)  BP: 161/96 (30 Jun 2017 12:00) (133/88 - 198/91)  BP(mean): 114 (30 Jun 2017 12:00) (88 - 179)  RR: 20 (30 Jun 2017 12:00) (12 - 23)  SpO2: 98% (30 Jun 2017 12:00) (88% - 100%)  ----------------------------------------------------------------------------------------  PHYSICAL EXAM  Neurologic Exam -                    Cognitive - Awake, Alert, AAO to self, place, date, year, situation. WORLD backwards intact. Repetition intact, difficulty with recall at 3 min (3/3 with hints). Right gaze preference     Communication - +dysarthria,      Cranial Nerves - left facial droop and numbness in V3 area     Motor - 5/5 in RUE and RLE. Left: 1/5 EF, EE, WF, , HF, KF, PF, EHL. 0/5 ShAbd, WE, KE, DF. Spasticity in LUE     Sensory - diminished to LT over LUE and LLE     Reflexes - DTR Intact, +L Babinski         Impression:  83 yo F with right IPH with left hemiparesis, dysarthria    Plan:  PT- ROM, Bed Mob, Transfers, Amb w AD and bracing as needed  OT- ADLs, bracing  SLP- Speech/Dysphagia eval and treat  Prec- Falls  SCDs for DVT Prophylaxis until AC is started as per neuro  Skin- Turn q2 h. Recommend L resting hand splint and PRAFO  Dispo- Recommend acute rehab on dc as pt could tolerate 3 hrs/day PT/OT/SLP

## 2017-06-30 NOTE — PROGRESS NOTE ADULT - SUBJECTIVE AND OBJECTIVE BOX
CC no chest pain or sob       PHYSICAL EXAM:  T(C): 36.9 (06-29-17 @ 20:00), Max: 36.9 (06-29-17 @ 20:00)  HR: 68 (06-30-17 @ 04:00) (63 - 78)  BP: 133/88 (06-30-17 @ 04:00) (133/88 - 198/91)  RR: 17 (06-30-17 @ 04:00) (15 - 19)  SpO2: 96% (06-30-17 @ 04:00) (88% - 100%)  Wt(kg): --  I&O's Summary    29 Jun 2017 07:01  -  30 Jun 2017 07:00  --------------------------------------------------------  IN: 1475 mL / OUT: 0 mL / NET: 1475 mL        Appearance: Normal	  Cardiovascular: Normal S1 S2,RRR, No JVD, + murmurs  Respiratory: Lungs clear to auscultation	  Gastrointestinal:  Soft, Non-tender, + BS	  Extremities: Normal range of motion, No clubbing, cyanosis . + trace edema bl LE         MEDICATIONS  (STANDING):  nystatin Cream 1 Application(s) Topical every 8 hours  cefTRIAXone   IVPB   IV Intermittent   cefTRIAXone   IVPB 1 Gram(s) IV Intermittent every 24 hours  metoprolol succinate ER 25 milliGRAM(s) Oral daily  lisinopril 40 milliGRAM(s) Oral daily  levothyroxine Injectable 25 MICROGram(s) IV Push daily  enoxaparin Injectable 40 milliGRAM(s) SubCutaneous daily  dextrose 5% + sodium chloride 0.45%. 1000 milliLiter(s) (75 mL/Hr) IV Continuous <Continuous>      TELEMETRY: 	NSR HR 83      DIAGNOSTIC TESTING:  [ ] Echocardiogram: pending     OTHER: 	    LABS:	 	                                13.4   5.11  )-----------( 158      ( 30 Jun 2017 07:28 )             42.0     06-29    139  |  105  |  10  ----------------------------<  96  3.7   |  20<L>  |  0.69    Ca    8.3<L>      29 Jun 2017 04:51  Phos  2.8     06-29  Mg     1.7     06-29

## 2017-06-30 NOTE — BEHAVIORAL HEALTH ASSESSMENT NOTE - NSBHCHARTREVIEWVS_PSY_A_CORE FT
Vital Signs Last 24 Hrs  T(C): 36.7 (30 Jun 2017 08:00), Max: 36.9 (29 Jun 2017 20:00)  T(F): 98 (30 Jun 2017 08:00), Max: 98.4 (29 Jun 2017 20:00)  HR: 82 (30 Jun 2017 12:00) (63 - 82)  BP: 161/96 (30 Jun 2017 12:00) (133/88 - 198/91)  BP(mean): 114 (30 Jun 2017 12:00) (88 - 179)  RR: 20 (30 Jun 2017 12:00) (12 - 23)  SpO2: 98% (30 Jun 2017 12:00) (88% - 100%)

## 2017-06-30 NOTE — BEHAVIORAL HEALTH ASSESSMENT NOTE - RISK ASSESSMENT
pt overall low risk for suicide attempt, pt denies si, no intent/plan,no hx attempts, no access to guns, no acute anxiety, insomnia, pt is future oriented

## 2017-06-30 NOTE — BEHAVIORAL HEALTH ASSESSMENT NOTE - NSBHCHARTREVIEWLAB_PSY_A_CORE FT
13.4   5.11  )-----------( 158      ( 30 Jun 2017 07:28 )             42.0     06-30    140  |  104  |  7   ----------------------------<  96  3.5   |  19<L>  |  0.70    Ca    8.2<L>      30 Jun 2017 07:28  Phos  2.8     06-29  Mg     1.7     06-29

## 2017-06-30 NOTE — BEHAVIORAL HEALTH ASSESSMENT NOTE - NSBHCHARTREVIEWIMAGING_PSY_A_CORE FT
INTERPRETATION:  Noncontrast CT of the brain.    CLINICAL INDICATION:  admitted with left facial droop and left   hemiplegia. Follow-up right posterior basal ganglia hemorrhage.    TECHNIQUE : Axial CT scanning of the brain was obtained from the skull   base to the vertex without the administration of intravenous contrast.      COMPARISON: CT brain 6/26/2017    FINDINGS:  2.9 x 1.9 cm parenchymal hemorrhage in theregion of the   posterior right lentiform nucleus previously measured 2.8 x 1.8 cm.    There is no hydrocephalus, midline shift, or effacement of the basal   cisterns.    IMPRESSION: Grossly stable 2.9 x 1.9 cm parenchymal hemorrhage in the   region of the posterior right lentiform nucleus.    No midline shift or hydrocephalus.

## 2017-06-30 NOTE — BEHAVIORAL HEALTH ASSESSMENT NOTE - HPI (INCLUDE ILLNESS QUALITY, SEVERITY, DURATION, TIMING, CONTEXT, MODIFYING FACTORS, ASSOCIATED SIGNS AND SYMPTOMS)
Pt is a 83y/o female, mult med issues HTN, GERD, choly on ASA p/w left side weakness, hx alcohol dependence, mult drug rehabs in past, no prior psych admissions, admitted for fall, found to have ICH, consulted for etoh withdrawal, ciwa, agitation. As per team, pt has long hx drinking 1 bottle vodka a week, hx rehabs, no DTS, sometimes restless, no physical aggression. Pt denies any mood symptoms, no si/hi, sleep is fair, appetite is "ok", concentration is good. Pt denies psychosis, paula, denies anxiety. Pt has a home aide who helps her with some things in the house few hrs a day. Pt is poor historian, couldn't explain full details of recent events, remembers falling, being confused. Pt denies alcohol withdrawal symptoms, no hx seizures, pt last drink was day prior to admission.

## 2017-06-30 NOTE — BEHAVIORAL HEALTH ASSESSMENT NOTE - SUMMARY
Pt is a 83 y/o female, mult med issues, hx alcohol dependence, admitted for fall, ICH, seen for alcohol withdrawal, ciwa, pt currently calm, no withdrawal symptoms,no mood sx, can continue ciwa, current meds

## 2017-06-30 NOTE — PROGRESS NOTE ADULT - ASSESSMENT
82 year old Female with HTN/MI/ ETOH abuse admitted with acute hemorrhagic CVA  / UTI/   now dysphagia     1. cv stable no chest pain or sob.  no evidence of acute ischemia/ACS  no events on tele   No decompensated CHF on exam   f/u TTE   patient optimize from a cv standpoint to proceed for peg placement with Low cardiac risk    2. Acute hemorrhagic CVA   presumed hypertensive hemorrhage  Neuro f/u   pending EEG     3. HTN; chronic    goal for BP < 160/90 per neuro  current BP acceptable   resume home BP meds when able to tolerate PO   continue with Hydralazine IVP PRN     4. Dysphagia  s/p egd +esophageal stricture  GI f.u   f/u CT abd/ pelvis   plan for IR for  Peg placement     5.UTI   med f/u   continue with IV abx     6. DVT PPX 82 year old Female with HTN/MI/ ETOH abuse admitted with acute hemorrhagic CVA  / UTI/   now dysphagia     1. cv stable no chest pain or sob.  no evidence of acute ischemia/ACS  no events on tele   No decompensated CHF on exam   f/u TTE   patient optimized from a cv standpoint to proceed for peg placement with Low cardiac risk    2. Acute hemorrhagic CVA   presumed hypertensive hemorrhage  Neuro f/u   pending EEG     3. HTN; chronic    goal for BP < 160/90 per neuro  current BP acceptable   resume home BP meds when able to tolerate PO   continue with Hydralazine IVP PRN     4. Dysphagia  s/p egd +esophageal stricture  GI f.u   f/u CT abd/ pelvis   plan for IR for  Peg placement     5.UTI   med f/u   continue with IV abx     6. DVT PPX

## 2017-06-30 NOTE — PROGRESS NOTE ADULT - SUBJECTIVE AND OBJECTIVE BOX
THE PATIENT WAS SEEN AND EXAMINED BY ME WITH THE HOUSESTAFF AND STROKE TEAM DURING MORNING ROUNDS.   HPI:  82y old  woman with PMH HTN, GERD, ETOH abuse and on ASA who presented with a chief complaint of left sided weakness. No tPA given due to IPH on CTH.  At baseline walks with cane and has aide at home to assist with ADLs      MEDICATIONS    allopurinol, ASA, Citalopram, metoprolol, famotidine, synthroid, atorvastatin      SOCIAL HISTORY:  No history of tobacco or alcohol use     Allergies    No Known Allergies    Intolerances (26 Jun 2017 14:34)      SUBJECTIVE: No events overnight.  No new neurologic complaints.      hydrALAZINE Injectable 10 milliGRAM(s) IV Push every 6 hours PRN  nystatin Cream 1 Application(s) Topical every 8 hours  acetaminophen  Suppository. 650 milliGRAM(s) Rectal every 6 hours PRN  ondansetron    Tablet 4 milliGRAM(s) Oral every 6 hours PRN  cefTRIAXone   IVPB   IV Intermittent   cefTRIAXone   IVPB 1 Gram(s) IV Intermittent every 24 hours  metoprolol succinate ER 25 milliGRAM(s) Oral daily  lisinopril 40 milliGRAM(s) Oral daily  levothyroxine Injectable 25 MICROGram(s) IV Push daily  enoxaparin Injectable 40 milliGRAM(s) SubCutaneous daily  dextrose 5% + sodium chloride 0.45%. 1000 milliLiter(s) IV Continuous <Continuous>      PHYSICAL EXAM:   Vital Signs Last 24 Hrs  T(C): 36.9 (29 Jun 2017 20:00), Max: 36.9 (29 Jun 2017 20:00)  T(F): 98.4 (29 Jun 2017 20:00), Max: 98.4 (29 Jun 2017 20:00)  HR: 68 (30 Jun 2017 04:00) (63 - 78)  BP: 133/88 (30 Jun 2017 04:00) (133/88 - 198/91)  BP(mean): 95 (30 Jun 2017 04:00) (88 - 179)  RR: 17 (30 Jun 2017 04:00) (15 - 19)  SpO2: 96% (30 Jun 2017 04:00) (88% - 100%)    General: No acute distress  HEENT: EOM intact, visual fields full  Abdomen: Soft, nontender, nondistended   Extremities: No edema    NEUROLOGICAL EXAM:  Mental status: Awake, alert, oriented name, age and hospital, no aphasia, no neglect, perseverates in telling stories   Cranial Nerves: Left facial asymmetry, no nystagmus, moderate to severe dysarthria, dysphagia   Motor exam: Normal tone, no drift, 5/5 RUE, 5/5 RLE, Left hemiplegia  Sensation: Diminished on left side   Coordination/ Gait: No dysmetria, gait not assessed on bedres  LABS:                        13.5   6.07  )-----------( 149      ( 29 Jun 2017 07:08 )             42.0    06-29    139  |  105  |  10  ----------------------------<  96  3.7   |  20<L>  |  0.69    Ca    8.3<L>      29 Jun 2017 04:51  Phos  2.8     06-29  Mg     1.7     06-29          IMAGING: Reviewed by me.   CT Head No Cont/ CERVICAL SPINE CT Head No Cont (06.26.17 @ 14:29) > Noncontrast CT brain: Acute right intraparenchymal hemorrhage  Acute intraparenchymal hemorrhage just lateral to the right thalamic body which measures 2.7 x 1.5 x 1.9 cm (AP x TV x CC). There is no midline shift or central herniation. CT cervicalspine:  No evidence for acute displaced fracture or traumatic malalignment. Cervical degenerative spondylosis  CT Head No Cont (06.27.17 @ 11:39) >Grossly stable 2.9 x 1.9 cm parenchymal hemorrhage in the region of the posterior right lentiform nucleus.  No midline shift or hydrocephalus. MRA Head w/o Cont (06.28.17 @ 23:37) > Right thalamocapsular region hemorrhage as seen on the prior CT. No abnormal enhancement to suggest an underlying lesion.  Suspicion of a moderate stenosis approximately 2 cm distal to the origin of the right internal carotid artery. Atherosclerotic irregularity at the level of the left internal carotid artery is suggested. THE PATIENT WAS SEEN AND EXAMINED BY ME WITH THE HOUSESTAFF AND STROKE TEAM DURING MORNING ROUNDS.   HPI:  82y old  woman with PMH HTN, GERD, ETOH abuse and on ASA who presented with a chief complaint of left sided weakness. No tPA given due to IPH on CTH.  At baseline walks with cane and has aide at home to assist with ADLs      MEDICATIONS    allopurinol, ASA, Citalopram, metoprolol, famotidine, synthroid, atorvastatin      SOCIAL HISTORY:  No history of tobacco or alcohol use     Allergies    No Known Allergies    Intolerances (26 Jun 2017 14:34)      SUBJECTIVE: episode of facial twitching overnight.  No new neurologic complaints.      hydrALAZINE Injectable 10 milliGRAM(s) IV Push every 6 hours PRN  nystatin Cream 1 Application(s) Topical every 8 hours  acetaminophen  Suppository. 650 milliGRAM(s) Rectal every 6 hours PRN  ondansetron    Tablet 4 milliGRAM(s) Oral every 6 hours PRN  cefTRIAXone   IVPB   IV Intermittent   cefTRIAXone   IVPB 1 Gram(s) IV Intermittent every 24 hours  metoprolol succinate ER 25 milliGRAM(s) Oral daily  lisinopril 40 milliGRAM(s) Oral daily  levothyroxine Injectable 25 MICROGram(s) IV Push daily  enoxaparin Injectable 40 milliGRAM(s) SubCutaneous daily  dextrose 5% + sodium chloride 0.45%. 1000 milliLiter(s) IV Continuous <Continuous>      PHYSICAL EXAM:   Vital Signs Last 24 Hrs  T(C): 36.9 (29 Jun 2017 20:00), Max: 36.9 (29 Jun 2017 20:00)  T(F): 98.4 (29 Jun 2017 20:00), Max: 98.4 (29 Jun 2017 20:00)  HR: 68 (30 Jun 2017 04:00) (63 - 78)  BP: 133/88 (30 Jun 2017 04:00) (133/88 - 198/91)  BP(mean): 95 (30 Jun 2017 04:00) (88 - 179)  RR: 17 (30 Jun 2017 04:00) (15 - 19)  SpO2: 96% (30 Jun 2017 04:00) (88% - 100%)    General: No acute distress  HEENT: EOM intact, visual fields full  Abdomen: Soft, nontender, nondistended   Extremities: No edema    NEUROLOGICAL EXAM:  Mental status: Awake, alert, oriented name, age and hospital, fluent speech, no neglect, perseverates in telling stories, able to follow commands   Cranial Nerves: Left facial asymmetry, no nystagmus, moderate to severe dysarthria, dysphagia   Motor exam: Normal tone, no drift, 5/5 RUE, 5/5 RLE, Left hemiplegia  Sensation: Diminished on left side   Coordination/ Gait: No dysmetria, gait not assessed on bedrest   LABS:                        13.5   6.07  )-----------( 149      ( 29 Jun 2017 07:08 )             42.0    06-29    139  |  105  |  10  ----------------------------<  96  3.7   |  20<L>  |  0.69    Ca    8.3<L>      29 Jun 2017 04:51  Phos  2.8     06-29  Mg     1.7     06-29          IMAGING: Reviewed by me.   CT Head No Cont/ CERVICAL SPINE CT Head No Cont (06.26.17 @ 14:29) > Noncontrast CT brain: Acute right intraparenchymal hemorrhage  Acute intraparenchymal hemorrhage just lateral to the right thalamic body which measures 2.7 x 1.5 x 1.9 cm (AP x TV x CC). There is no midline shift or central herniation. CT cervicalspine:  No evidence for acute displaced fracture or traumatic malalignment. Cervical degenerative spondylosis  CT Head No Cont (06.27.17 @ 11:39) >Grossly stable 2.9 x 1.9 cm parenchymal hemorrhage in the region of the posterior right lentiform nucleus.  No midline shift or hydrocephalus. MRA Head w/o Cont (06.28.17 @ 23:37) > Right thalamocapsular region hemorrhage as seen on the prior CT. No abnormal enhancement to suggest an underlying lesion.  Suspicion of a moderate stenosis approximately 2 cm distal to the origin of the right internal carotid artery. Atherosclerotic irregularity at the level of the left internal carotid artery is suggested.

## 2017-07-01 LAB
ANION GAP SERPL CALC-SCNC: 15 MMOL/L — SIGNIFICANT CHANGE UP (ref 5–17)
BUN SERPL-MCNC: 12 MG/DL — SIGNIFICANT CHANGE UP (ref 7–23)
CALCIUM SERPL-MCNC: 8.2 MG/DL — LOW (ref 8.4–10.5)
CHLORIDE SERPL-SCNC: 103 MMOL/L — SIGNIFICANT CHANGE UP (ref 96–108)
CO2 SERPL-SCNC: 17 MMOL/L — LOW (ref 22–31)
CREAT SERPL-MCNC: 0.7 MG/DL — SIGNIFICANT CHANGE UP (ref 0.5–1.3)
GLUCOSE SERPL-MCNC: 107 MG/DL — HIGH (ref 70–99)
HCT VFR BLD CALC: 38.7 % — SIGNIFICANT CHANGE UP (ref 34.5–45)
HGB BLD-MCNC: 13.3 G/DL — SIGNIFICANT CHANGE UP (ref 11.5–15.5)
MCHC RBC-ENTMCNC: 34.3 GM/DL — SIGNIFICANT CHANGE UP (ref 32–36)
MCHC RBC-ENTMCNC: 36.7 PG — HIGH (ref 27–34)
MCV RBC AUTO: 107 FL — HIGH (ref 80–100)
PLATELET # BLD AUTO: ABNORMAL (ref 150–400)
POTASSIUM SERPL-MCNC: 3.9 MMOL/L — SIGNIFICANT CHANGE UP (ref 3.5–5.3)
POTASSIUM SERPL-SCNC: 3.9 MMOL/L — SIGNIFICANT CHANGE UP (ref 3.5–5.3)
RBC # BLD: 3.62 M/UL — LOW (ref 3.8–5.2)
RBC # FLD: 13.1 % — SIGNIFICANT CHANGE UP (ref 10.3–14.5)
SODIUM SERPL-SCNC: 135 MMOL/L — SIGNIFICANT CHANGE UP (ref 135–145)
WBC # BLD: 4 K/UL — SIGNIFICANT CHANGE UP (ref 3.8–10.5)
WBC # FLD AUTO: 4 K/UL — SIGNIFICANT CHANGE UP (ref 3.8–10.5)

## 2017-07-01 RX ORDER — LACTULOSE 10 G/15ML
10 SOLUTION ORAL DAILY
Qty: 0 | Refills: 0 | Status: COMPLETED | OUTPATIENT
Start: 2017-07-01 | End: 2017-07-01

## 2017-07-01 RX ORDER — ACETAMINOPHEN 500 MG
650 TABLET ORAL EVERY 6 HOURS
Qty: 0 | Refills: 0 | Status: DISCONTINUED | OUTPATIENT
Start: 2017-07-01 | End: 2017-07-12

## 2017-07-01 RX ADMIN — NYSTATIN CREAM 1 APPLICATION(S): 100000 CREAM TOPICAL at 14:22

## 2017-07-01 RX ADMIN — CEFTRIAXONE 100 GRAM(S): 500 INJECTION, POWDER, FOR SOLUTION INTRAMUSCULAR; INTRAVENOUS at 13:01

## 2017-07-01 RX ADMIN — NYSTATIN CREAM 1 APPLICATION(S): 100000 CREAM TOPICAL at 07:16

## 2017-07-01 RX ADMIN — Medication 650 MILLIGRAM(S): at 17:35

## 2017-07-01 RX ADMIN — Medication 650 MILLIGRAM(S): at 18:33

## 2017-07-01 RX ADMIN — Medication 25 MICROGRAM(S): at 07:15

## 2017-07-01 RX ADMIN — LACTULOSE 10 GRAM(S): 10 SOLUTION ORAL at 16:47

## 2017-07-01 RX ADMIN — ENOXAPARIN SODIUM 40 MILLIGRAM(S): 100 INJECTION SUBCUTANEOUS at 13:13

## 2017-07-01 RX ADMIN — Medication 25 MILLIGRAM(S): at 13:00

## 2017-07-01 RX ADMIN — NYSTATIN CREAM 1 APPLICATION(S): 100000 CREAM TOPICAL at 22:45

## 2017-07-01 RX ADMIN — LISINOPRIL 40 MILLIGRAM(S): 2.5 TABLET ORAL at 13:00

## 2017-07-01 NOTE — PROGRESS NOTE ADULT - ASSESSMENT
82 year old Female with HTN/MI/ ETOH abuse admitted with acute hemorrhagic CVA  / UTI/   now dysphagia   cv stable no chest pain or sob.  no evidence of acute ischemia/ACS  no events on tele   No decompensated CHF on exam   f/u TTE   patient optimized from a cv standpoint to proceed for peg placement with Low cardiac risk  Care per neuro    goal for BP < 160/90 per neuro  current BP acceptable   resume home BP meds when able to tolerate PO

## 2017-07-01 NOTE — PROGRESS NOTE ADULT - ASSESSMENT
ASSESSMENT: This is a 82y woman with HTN presents with R BG presumed hypertensive hemorrhage. Impression: R hypertensive ICH.     NEURO: Episode of facial twitching on 06/29, EEG ordered, Continue close monitoring for neurologic deterioration, titrate to normotension, titrate statin to LDL goal less than 70, hx of ETOH abuse, psych consult appreciated, Physical therapy/OT recommend AR, continue current rehab treatment.     ANTITHROMBOTIC THERAPY: none in setting of IPH    PULMONARY:  protecting airway, saturating well     CARDIOVASCULAR:  cardiac monitoring without events, will resume home BP meds when s/s eval complete and have po access                            SBP goal: normotension    GASTROINTESTINAL:  dysphagia screen by SPL failed, MBS done, failed, discussed with family re: NGT and PEG, patient does not seem to retain explanations given and not understanding why she can't eat.  GI attempted PEG and unable to pass scope due to esophageal stricture and recommended IR placement of PEG tube.  plan to repeat CT of Abdomen due to not optimal test due to barium residual, will continue bowel regimen and will obtain abd Xray prior to CT abd. NGT in place, tolerating feedings. IR made aware of plan for PEG placement.  Additionally,        Diet: NGT    RENAL: BUN/Cr stable, good urine output      Na Goal: Greater than 135     Basurto: No    HEMATOLOGY: H/H  Platelets stable, no s/s of bleeding     DVT ppx: LMWH    ID: afebrile, no leukocytosis, continue ceftriaxone for UTI (5/7 days)    OTHER:     DISPOSITION: AR as per PMR eval once stable and workup is complete     CORE MEASURES:        Admission NIHSS: 11     TPA:  NO      LDL/HDL:  86/71     Depression Screen: 0     Statin Therapy: not in setting of IPH     Dysphagia Screen:  FAIL     Smoking NO      Afib  NO     [employer]  YES

## 2017-07-01 NOTE — PROGRESS NOTE ADULT - SUBJECTIVE AND OBJECTIVE BOX
THE PATIENT WAS SEEN AND EXAMINED BY ME WITH THE HOUSESTAFF AND STROKE TEAM DURING MORNING ROUNDS.   HPI:  82y old  woman with PMH HTN, GERD, ETOH abuse and on ASA who presented with a chief complaint of left sided weakness. No tPA given due to IPH on CTH.  At baseline walks with cane and has aide at home to assist with ADLs        MEDICATIONS    allopurinol, ASA, Citalopram, metoprolol, famotidine, synthroid, atorvastatin      SOCIAL HISTORY:  No history of tobacco or alcohol use     Allergies    No Known Allergies    Intolerances (26 Jun 2017 14:34)      SUBJECTIVE: No events overnight.  No new neurologic complaints.      hydrALAZINE Injectable 10 milliGRAM(s) IV Push every 6 hours PRN  nystatin Cream 1 Application(s) Topical every 8 hours  acetaminophen  Suppository. 650 milliGRAM(s) Rectal every 6 hours PRN  ondansetron    Tablet 4 milliGRAM(s) Oral every 6 hours PRN  cefTRIAXone   IVPB   IV Intermittent   cefTRIAXone   IVPB 1 Gram(s) IV Intermittent every 24 hours  metoprolol succinate ER 25 milliGRAM(s) Oral daily  lisinopril 40 milliGRAM(s) Oral daily  levothyroxine Injectable 25 MICROGram(s) IV Push daily  enoxaparin Injectable 40 milliGRAM(s) SubCutaneous daily  dextrose 5% + sodium chloride 0.45%. 1000 milliLiter(s) IV Continuous <Continuous>  bisacodyl Suppository 10 milliGRAM(s) Rectal daily  LORazepam     Tablet 1 milliGRAM(s) Oral every 8 hours PRN      PHYSICAL EXAM:   Vital Signs Last 24 Hrs  T(C): 36.9 (30 Jun 2017 22:00), Max: 36.9 (30 Jun 2017 22:00)  T(F): 98.4 (30 Jun 2017 22:00), Max: 98.4 (30 Jun 2017 22:00)  HR: 61 (01 Jul 2017 06:00) (61 - 90)  BP: 114/49 (01 Jul 2017 06:00) (96/47 - 215/86)  BP(mean): 69 (01 Jul 2017 06:00) (62 - 136)  RR: 22 (01 Jul 2017 06:00) (9 - 22)  SpO2: 94% (01 Jul 2017 06:00) (92% - 100%)    General: No acute distress  HEENT: EOM intact, visual fields full  Abdomen: Soft, nontender, nondistended   Extremities: No edema    NEUROLOGICAL EXAM:  Mental status: Awake, alert, oriented name, age and hospital, fluent speech, no neglect, perseverates in telling stories, able to follow commands   Cranial Nerves: Left facial asymmetry, no nystagmus, moderate to severe dysarthria, dysphagia   Motor exam: Normal tone, no drift, 5/5 RUE, 5/5 RLE, Left hemiplegia  Sensation: Diminished on left side   Coordination/ Gait: No dysmetria, gait not assessed on bedrest   LABS:                        13.4   5.11  )-----------( 158      ( 30 Jun 2017 07:28 )             42.0    06-30    140  |  104  |  7   ----------------------------<  96  3.5   |  19<L>  |  0.70    Ca    8.2<L>      30 Jun 2017 07:28          IMAGING: Reviewed by me.   CT Head No Cont/ CERVICAL SPINE CT Head No Cont (06.26.17 @ 14:29) > Noncontrast CT brain: Acute right intraparenchymal hemorrhage  Acute intraparenchymal hemorrhage just lateral to the right thalamic body which measures 2.7 x 1.5 x 1.9 cm (AP x TV x CC). There is no midline shift or central herniation. CT cervicalspine:  No evidence for acute displaced fracture or traumatic malalignment. Cervical degenerative spondylosis  CT Head No Cont (06.27.17 @ 11:39) >Grossly stable 2.9 x 1.9 cm parenchymal hemorrhage in the region of the posterior right lentiform nucleus.  No midline shift or hydrocephalus. MRA Head w/o Cont (06.28.17 @ 23:37) > Right thalamocapsular region hemorrhage as seen on the prior CT. No abnormal enhancement to suggest an underlying lesion.  Suspicion of a moderate stenosis approximately 2 cm distal to the origin of the right internal carotid artery. Atherosclerotic irregularity at the level of the left internal carotid artery is suggested. THE PATIENT WAS SEEN AND EXAMINED BY ME WITH THE HOUSESTAFF AND STROKE TEAM DURING MORNING ROUNDS.   HPI:  82y old  woman with PMH HTN, GERD, ETOH abuse and on ASA who presented with a chief complaint of left sided weakness. No tPA given due to IPH on CTH.  At baseline walks with cane and has aide at home to assist with ADLs        MEDICATIONS    allopurinol, ASA, Citalopram, metoprolol, famotidine, synthroid, atorvastatin      SOCIAL HISTORY:  No history of tobacco or alcohol use     Allergies    No Known Allergies    Intolerances (26 Jun 2017 14:34)      SUBJECTIVE: No events overnight.  No new neurologic complaints.      hydrALAZINE Injectable 10 milliGRAM(s) IV Push every 6 hours PRN  nystatin Cream 1 Application(s) Topical every 8 hours  acetaminophen  Suppository. 650 milliGRAM(s) Rectal every 6 hours PRN  ondansetron    Tablet 4 milliGRAM(s) Oral every 6 hours PRN  cefTRIAXone   IVPB   IV Intermittent   cefTRIAXone   IVPB 1 Gram(s) IV Intermittent every 24 hours  metoprolol succinate ER 25 milliGRAM(s) Oral daily  lisinopril 40 milliGRAM(s) Oral daily  levothyroxine Injectable 25 MICROGram(s) IV Push daily  enoxaparin Injectable 40 milliGRAM(s) SubCutaneous daily  dextrose 5% + sodium chloride 0.45%. 1000 milliLiter(s) IV Continuous <Continuous>  bisacodyl Suppository 10 milliGRAM(s) Rectal daily  LORazepam     Tablet 1 milliGRAM(s) Oral every 8 hours PRN      PHYSICAL EXAM:   Vital Signs Last 24 Hrs  T(C): 36.9 (30 Jun 2017 22:00), Max: 36.9 (30 Jun 2017 22:00)  T(F): 98.4 (30 Jun 2017 22:00), Max: 98.4 (30 Jun 2017 22:00)  HR: 61 (01 Jul 2017 06:00) (61 - 90)  BP: 114/49 (01 Jul 2017 06:00) (96/47 - 215/86)  BP(mean): 69 (01 Jul 2017 06:00) (62 - 136)  RR: 22 (01 Jul 2017 06:00) (9 - 22)  SpO2: 94% (01 Jul 2017 06:00) (92% - 100%)    General: No acute distress  HEENT: EOM intact, visual fields full  Abdomen: Soft, nontender, nondistended   Extremities: No edema    NEUROLOGICAL EXAM:  Mental status: eyes open, alert, oriented name, age and hospital, fluent speech, no neglect, perseverates in telling stories, able to follow commands   Cranial Nerves: Left facial asymmetry, no nystagmus, moderate to severe dysarthria, dysphagia   Motor exam: Normal tone, no drift,  RUE 5/5,  RLE 5/5, Left hemiplegia  Sensation: Diminished on left side   Coordination/ Gait: No dysmetria, gait not assessed on bedrest   LABS:                        13.4   5.11  )-----------( 158      ( 30 Jun 2017 07:28 )             42.0    06-30    140  |  104  |  7   ----------------------------<  96  3.5   |  19<L>  |  0.70    Ca    8.2<L>      30 Jun 2017 07:28          IMAGING: Reviewed by me.   CT Head No Cont/ CERVICAL SPINE CT Head No Cont (06.26.17 @ 14:29) > Noncontrast CT brain: Acute right intraparenchymal hemorrhage  Acute intraparenchymal hemorrhage just lateral to the right thalamic body which measures 2.7 x 1.5 x 1.9 cm (AP x TV x CC). There is no midline shift or central herniation. CT cervicalspine:  No evidence for acute displaced fracture or traumatic malalignment. Cervical degenerative spondylosis  CT Head No Cont (06.27.17 @ 11:39) >Grossly stable 2.9 x 1.9 cm parenchymal hemorrhage in the region of the posterior right lentiform nucleus.  No midline shift or hydrocephalus. MRA Head w/o Cont (06.28.17 @ 23:37) > Right thalamocapsular region hemorrhage as seen on the prior CT. No abnormal enhancement to suggest an underlying lesion.  Suspicion of a moderate stenosis approximately 2 cm distal to the origin of the right internal carotid artery. Atherosclerotic irregularity at the level of the left internal carotid artery is suggested.

## 2017-07-01 NOTE — PROGRESS NOTE ADULT - SUBJECTIVE AND OBJECTIVE BOX
CC; No CP/SOB, hypotensive eralier    TELEMETRY: NSR    PHYSICAL EXAM:    T(C): 36.9 (06-30-17 @ 22:00), Max: 36.9 (06-30-17 @ 22:00)  HR: 70 (07-01-17 @ 08:00) (61 - 90)  BP: 102/59 (07-01-17 @ 08:00) (96/47 - 215/86)  RR: 20 (07-01-17 @ 08:00) (9 - 22)  SpO2: 94% (07-01-17 @ 08:00) (92% - 100%)  Wt(kg): --  I&O's Summary    30 Jun 2017 07:01  -  01 Jul 2017 07:00  --------------------------------------------------------  IN: 1025 mL / OUT: 0 mL / NET: 1025 mL        Appearance: Normal	  Cardiovascular: Normal S1 S2,RRR, No JVD, No murmurs  Respiratory: Lungs clear to auscultation	  Gastrointestinal:  Soft, Non-tender, + BS	  Extremities: Normal range of motion, No clubbing, cyanosis or edema  Vascular: Peripheral pulses palpable 2+ bilaterally     LABS:	 	                          13.4   5.11  )-----------( 158      ( 30 Jun 2017 07:28 )             42.0     06-30    140  |  104  |  7   ----------------------------<  96  3.5   |  19<L>  |  0.70    Ca    8.2<L>      30 Jun 2017 07:28            CARDIAC MARKERS:

## 2017-07-02 PROCEDURE — 74176 CT ABD & PELVIS W/O CONTRAST: CPT | Mod: 26

## 2017-07-02 PROCEDURE — 74000: CPT | Mod: 26

## 2017-07-02 PROCEDURE — 95957 EEG DIGITAL ANALYSIS: CPT | Mod: 26

## 2017-07-02 PROCEDURE — 95819 EEG AWAKE AND ASLEEP: CPT | Mod: 26

## 2017-07-02 PROCEDURE — 74176 CT ABD & PELVIS W/O CONTRAST: CPT | Mod: 26,77

## 2017-07-02 RX ORDER — METOPROLOL TARTRATE 50 MG
25 TABLET ORAL
Qty: 0 | Refills: 0 | Status: DISCONTINUED | OUTPATIENT
Start: 2017-07-02 | End: 2017-07-12

## 2017-07-02 RX ORDER — KETOROLAC TROMETHAMINE 30 MG/ML
15 SYRINGE (ML) INJECTION ONCE
Qty: 0 | Refills: 0 | Status: DISCONTINUED | OUTPATIENT
Start: 2017-07-02 | End: 2017-07-02

## 2017-07-02 RX ADMIN — NYSTATIN CREAM 1 APPLICATION(S): 100000 CREAM TOPICAL at 22:30

## 2017-07-02 RX ADMIN — Medication 10 MILLIGRAM(S): at 12:04

## 2017-07-02 RX ADMIN — Medication 650 MILLIGRAM(S): at 14:00

## 2017-07-02 RX ADMIN — Medication 650 MILLIGRAM(S): at 21:30

## 2017-07-02 RX ADMIN — Medication 650 MILLIGRAM(S): at 15:00

## 2017-07-02 RX ADMIN — ENOXAPARIN SODIUM 40 MILLIGRAM(S): 100 INJECTION SUBCUTANEOUS at 12:03

## 2017-07-02 RX ADMIN — LISINOPRIL 40 MILLIGRAM(S): 2.5 TABLET ORAL at 06:41

## 2017-07-02 RX ADMIN — Medication 650 MILLIGRAM(S): at 06:42

## 2017-07-02 RX ADMIN — Medication 25 MICROGRAM(S): at 06:41

## 2017-07-02 RX ADMIN — NYSTATIN CREAM 1 APPLICATION(S): 100000 CREAM TOPICAL at 14:00

## 2017-07-02 RX ADMIN — Medication 25 MILLIGRAM(S): at 17:12

## 2017-07-02 RX ADMIN — Medication 10 MILLIGRAM(S): at 20:20

## 2017-07-02 RX ADMIN — CEFTRIAXONE 100 GRAM(S): 500 INJECTION, POWDER, FOR SOLUTION INTRAMUSCULAR; INTRAVENOUS at 10:00

## 2017-07-02 RX ADMIN — Medication 650 MILLIGRAM(S): at 07:40

## 2017-07-02 RX ADMIN — Medication 15 MILLIGRAM(S): at 23:00

## 2017-07-02 RX ADMIN — Medication 650 MILLIGRAM(S): at 20:21

## 2017-07-02 RX ADMIN — NYSTATIN CREAM 1 APPLICATION(S): 100000 CREAM TOPICAL at 06:36

## 2017-07-02 RX ADMIN — Medication 15 MILLIGRAM(S): at 22:30

## 2017-07-02 NOTE — PROGRESS NOTE ADULT - SUBJECTIVE AND OBJECTIVE BOX
CC; No CP/SOB    TELEMETRY:     PHYSICAL EXAM:    T(C): 36.7 (07-02-17 @ 11:57), Max: 37.1 (07-02-17 @ 04:57)  HR: 67 (07-02-17 @ 11:57) (67 - 80)  BP: 157/78 (07-02-17 @ 11:57) (144/78 - 178/74)  RR: 18 (07-02-17 @ 11:57) (12 - 22)  SpO2: 97% (07-02-17 @ 11:57) (97% - 100%)  Wt(kg): --  I&O's Summary    01 Jul 2017 07:01  -  02 Jul 2017 07:00  --------------------------------------------------------  IN: 1425 mL / OUT: 0 mL / NET: 1425 mL        Appearance: Normal	  Cardiovascular: Normal S1 S2,RRR, No JVD, No murmurs  Respiratory: Lungs clear to auscultation	  Gastrointestinal:  Soft, Non-tender, + BS	  Extremities: Normal range of motion, No clubbing, cyanosis or edema  Vascular: Peripheral pulses palpable 2+ bilaterally     LABS:	 	                          13.3   4.0   )-----------( CLUMPED    ( 01 Jul 2017 10:37 )             38.7     07-01    135  |  103  |  12  ----------------------------<  107<H>  3.9   |  17<L>  |  0.70    Ca    8.2<L>      01 Jul 2017 10:34            CARDIAC MARKERS:

## 2017-07-02 NOTE — PROGRESS NOTE ADULT - ASSESSMENT
ASSESSMENT: This is a 82y woman with HTN presents with R BG presumed hypertensive hemorrhage. Impression: R hypertensive ICH.     NEURO: Neuro exam unchanged, Episode of facial twitching on 06/29, pending EEG. Continue close monitoring for neurologic deterioration, titrate to normotension, titrate statin to LDL goal less than 70, hx of ETOH abuse, psych consult appreciated, Physical therapy/OT recommend AR, continue current rehab treatment.     ANTITHROMBOTIC THERAPY: none in setting of IPH    PULMONARY:  protecting airway, saturating well     CARDIOVASCULAR:  cardiac monitoring without events, will resume home BP meds when s/s eval complete and have po access                            SBP goal: normotension    GASTROINTESTINAL:  dysphagia screen by SPL failed, MBS done, failed, discussed with family re: NGT and PEG, patient does not seem to retain explanations given and not understanding why she can't eat.  GI attempted PEG and unable to pass scope due to esophageal stricture and recommended IR placement of PEG tube.  plan to repeat CT of Abdomen due to not optimal test due to barium residual, BM X # 07/02 will obtain abd Xray to eval if barium is still present, will continue bowel regimen.  CT abd after abd xray to eval anatomy as per IR request. NGT in place, tolerating feedings. IR made aware of plan for PEG placement.       Diet: NGT    RENAL: BUN/Cr stable, good urine output      Na Goal: Greater than 135     Basurto: No    HEMATOLOGY: H/H  Platelets stable, no s/s of bleeding     DVT ppx: LMWH    ID: afebrile, no leukocytosis, continue ceftriaxone for UTI (5/7 days)    OTHER:     DISPOSITION: AR as per PMR eval once stable and workup is complete     CORE MEASURES:        Admission NIHSS: 11     TPA:  NO      LDL/HDL:  86/71     Depression Screen: 0     Statin Therapy: not in setting of IPH     Dysphagia Screen:  FAIL     Smoking NO      Afib  NO     [employer]  YES

## 2017-07-02 NOTE — PROGRESS NOTE ADULT - SUBJECTIVE AND OBJECTIVE BOX
THE PATIENT WAS SEEN AND EXAMINED BY ME WITH THE HOUSESTAFF AND STROKE TEAM DURING MORNING ROUNDS.   HPI:    82y old  woman with PMH HTN, GERD, ETOH abuse and on ASA who presented with a chief complaint of left sided weakness. No tPA given due to IPH on CTH.  At baseline walks with cane and has aide at home to assist with ADLs        MEDICATIONS    allopurinol, ASA, Citalopram, metoprolol, famotidine, synthroid, atorvastatin      SOCIAL HISTORY:  No history of tobacco or alcohol use     Allergies    No Known Allergies    Intolerances (26 Jun 2017 14:34)      SUBJECTIVE: No events overnight.  No new neurologic complaints.      hydrALAZINE Injectable 10 milliGRAM(s) IV Push every 6 hours PRN  nystatin Cream 1 Application(s) Topical every 8 hours  ondansetron    Tablet 4 milliGRAM(s) Oral every 6 hours PRN  cefTRIAXone   IVPB   IV Intermittent   cefTRIAXone   IVPB 1 Gram(s) IV Intermittent every 24 hours  metoprolol succinate ER 25 milliGRAM(s) Oral daily  lisinopril 40 milliGRAM(s) Oral daily  levothyroxine Injectable 25 MICROGram(s) IV Push daily  enoxaparin Injectable 40 milliGRAM(s) SubCutaneous daily  dextrose 5% + sodium chloride 0.45%. 1000 milliLiter(s) IV Continuous <Continuous>  bisacodyl Suppository 10 milliGRAM(s) Rectal daily  LORazepam     Tablet 1 milliGRAM(s) Oral every 8 hours PRN  acetaminophen    Suspension. 650 milliGRAM(s) Oral every 6 hours PRN      PHYSICAL EXAM:   Vital Signs Last 24 Hrs  T(C): 36.7 (02 Jul 2017 11:57), Max: 37.1 (02 Jul 2017 04:57)  T(F): 98.1 (02 Jul 2017 11:57), Max: 98.7 (02 Jul 2017 04:57)  HR: 67 (02 Jul 2017 11:57) (67 - 80)  BP: 157/78 (02 Jul 2017 11:57) (144/78 - 178/74)  BP(mean): 92 (01 Jul 2017 16:00) (92 - 99)  RR: 18 (02 Jul 2017 11:57) (12 - 22)  SpO2: 97% (02 Jul 2017 11:57) (97% - 100%)    General: No acute distress  HEENT: EOM intact, visual fields full  Abdomen: Soft, nontender, nondistended   Extremities: No edema    NEUROLOGICAL EXAM:  Mental status: eyes open, alert, oriented name, age and hospital, fluent speech, no neglect, perseverates in telling stories, able to follow commands   Cranial Nerves: Left facial asymmetry, no nystagmus, moderate to severe dysarthria,  Motor exam: Normal tone, no drift,  RUE 5/5,  RLE 5/5, Left hemiplegia  Sensation: Diminished on left side   Coordination/ Gait: No dysmetria, gait not assessed on bedrest   LABS:                        13.3   4.0   )-----------( CLUMPED    ( 01 Jul 2017 10:37 )             38.7    07-01    135  |  103  |  12  ----------------------------<  107<H>  3.9   |  17<L>  |  0.70    Ca    8.2<L>      01 Jul 2017 10:34          IMAGING: Reviewed by me.     CT Head No Cont/ CERVICAL SPINE CT Head No Cont (06.26.17 @ 14:29) > Noncontrast CT brain: Acute right intraparenchymal hemorrhage  Acute intraparenchymal hemorrhage just lateral to the right thalamic body which measures 2.7 x 1.5 x 1.9 cm (AP x TV x CC). There is no midline shift or central herniation. CT cervicalspine:  No evidence for acute displaced fracture or traumatic malalignment. Cervical degenerative spondylosis  CT Head No Cont (06.27.17 @ 11:39) >Grossly stable 2.9 x 1.9 cm parenchymal hemorrhage in the region of the posterior right lentiform nucleus.  No midline shift or hydrocephalus. MRA Head w/o Cont (06.28.17 @ 23:37) > Right thalamocapsular region hemorrhage as seen on the prior CT. No abnormal enhancement to suggest an underlying lesion.  Suspicion of a moderate stenosis approximately 2 cm distal to the origin of the right internal carotid artery. Atherosclerotic irregularity at the level of the left internal carotid artery is suggested.

## 2017-07-03 PROCEDURE — 99233 SBSQ HOSP IP/OBS HIGH 50: CPT

## 2017-07-03 PROCEDURE — 74000: CPT | Mod: 26

## 2017-07-03 RX ORDER — DIPHENHYDRAMINE HCL 50 MG
25 CAPSULE ORAL ONCE
Qty: 0 | Refills: 0 | Status: COMPLETED | OUTPATIENT
Start: 2017-07-03 | End: 2017-07-03

## 2017-07-03 RX ORDER — QUETIAPINE FUMARATE 200 MG/1
25 TABLET, FILM COATED ORAL ONCE
Qty: 0 | Refills: 0 | Status: DISCONTINUED | OUTPATIENT
Start: 2017-07-03 | End: 2017-07-03

## 2017-07-03 RX ORDER — ACETAMINOPHEN 500 MG
1000 TABLET ORAL ONCE
Qty: 0 | Refills: 0 | Status: COMPLETED | OUTPATIENT
Start: 2017-07-03 | End: 2017-07-03

## 2017-07-03 RX ORDER — QUETIAPINE FUMARATE 200 MG/1
12.5 TABLET, FILM COATED ORAL ONCE
Qty: 0 | Refills: 0 | Status: DISCONTINUED | OUTPATIENT
Start: 2017-07-03 | End: 2017-07-03

## 2017-07-03 RX ADMIN — NYSTATIN CREAM 1 APPLICATION(S): 100000 CREAM TOPICAL at 21:10

## 2017-07-03 RX ADMIN — Medication 25 MILLIGRAM(S): at 01:17

## 2017-07-03 RX ADMIN — Medication 10 MILLIGRAM(S): at 06:35

## 2017-07-03 RX ADMIN — ENOXAPARIN SODIUM 40 MILLIGRAM(S): 100 INJECTION SUBCUTANEOUS at 11:05

## 2017-07-03 RX ADMIN — Medication 10 MILLIGRAM(S): at 15:47

## 2017-07-03 RX ADMIN — Medication 650 MILLIGRAM(S): at 21:10

## 2017-07-03 RX ADMIN — NYSTATIN CREAM 1 APPLICATION(S): 100000 CREAM TOPICAL at 06:32

## 2017-07-03 RX ADMIN — NYSTATIN CREAM 1 APPLICATION(S): 100000 CREAM TOPICAL at 13:01

## 2017-07-03 RX ADMIN — Medication 25 MILLIGRAM(S): at 17:27

## 2017-07-03 RX ADMIN — Medication 650 MILLIGRAM(S): at 22:00

## 2017-07-03 RX ADMIN — Medication 400 MILLIGRAM(S): at 15:00

## 2017-07-03 RX ADMIN — Medication 1000 MILLIGRAM(S): at 15:15

## 2017-07-03 RX ADMIN — Medication 25 MICROGRAM(S): at 06:35

## 2017-07-03 RX ADMIN — CEFTRIAXONE 100 GRAM(S): 500 INJECTION, POWDER, FOR SOLUTION INTRAMUSCULAR; INTRAVENOUS at 11:04

## 2017-07-03 NOTE — PROGRESS NOTE ADULT - ASSESSMENT
ASSESSMENT: This is a 82y woman with HTN presents with R BG presumed hypertensive hemorrhage. Impression: R hypertensive ICH.     NEURO: Neuro exam unchanged, Episode of facial twitching on 06/29,  EEG technically difficult due to harward noise, recommend repeat EEG. Continue close monitoring for neurologic deterioration, titrate to normotension, titrate statin to LDL goal less than 70, hx of ETOH abuse, psych consult appreciated, Physical therapy/OT recommend AR, continue current rehab treatment.     ANTITHROMBOTIC THERAPY: none in setting of IPH    PULMONARY:  protecting airway, saturating well     CARDIOVASCULAR:  cardiac monitoring without events, resumed home BP meds.   SBP goal: normotension    GASTROINTESTINAL:  dysphagia screen by SPL failed, MBS done, failed, discussed with family re: NGT and PEG, patient does not seem to retain explanations given and not understanding why she can't eat.  GI attempted PEG and unable to pass scope due to esophageal stricture and recommended IR placement of PEG tube.  IR called to evaluate CT with minimum barium and assess if there is a need for repeat and if they see a window to place gastrostomy tube possibly on Wednesday.  BM X # 07/02 obtained abd Xray still which shows minimum barium still present, will continue bowel regimen.   NGT in place, feeds were on hold pending g-tube placement, awaiting call from IR to schedule,        Diet: NGT with Jevity, tolerating feedings    RENAL: BUN/Cr stable, good urine output      Na Goal: Greater than 135     Basurto: No    HEMATOLOGY: H/H  Platelets stable, no s/s of bleeding     DVT ppx: LMWH    ID: afebrile, no leukocytosis, continue ceftriaxone for UTI (5/6 days)    OTHER:     DISPOSITION: AR as per PMR eval once stable and workup is complete     CORE MEASURES:        Admission NIHSS: 11     TPA:  NO      LDL/HDL:  86/71     Depression Screen: 0     Statin Therapy: not in setting of IPH     Dysphagia Screen:  FAIL     Smoking NO      Afib  NO     [employer]  YES ASSESSMENT: This is a 82y woman with HTN presents with R BG presumed hypertensive hemorrhage. Impression: R hypertensive ICH.     NEURO: Neuro exam unchanged, Episode of facial twitching on 06/29,  EEG technically difficult due to hardware noise, recommend repeat EEG. Continue close monitoring for neurologic deterioration, titrate to normotension, titrate statin to LDL goal less than 70, hx of ETOH abuse, psych consult appreciated, Physical therapy/OT recommend AR, continue current rehab treatment.     ANTITHROMBOTIC THERAPY: none in setting of IPH    PULMONARY:  protecting airway, saturating well     CARDIOVASCULAR:  cardiac monitoring without events, resumed home BP meds.   SBP goal: normotension    GASTROINTESTINAL:  dysphagia screen by SPL failed, MBS done, failed, discussed with family re: NGT and PEG, patient does not seem to retain explanations given and not understanding why she can't eat.  GI attempted PEG and unable to pass scope due to esophageal stricture and recommended IR placement of PEG tube.  IR called to evaluate CT with minimum barium and assess if there is a need for repeat and if they see a window to place gastrostomy tube possibly on 7/5/17.  BM X # 07/02 obtained abd Xray still which shows minimum barium still present, will continue bowel regimen.   NGT in place, feeds were on hold pending g-tube placement, awaiting call from IR to schedule,        Diet: NGT with Jevity, tolerating feedings    RENAL: BUN/Cr stable, good urine output      Na Goal: Greater than 135     Basurto: No    HEMATOLOGY: H/H  Platelets stable, no s/s of bleeding     DVT ppx: LMWH    ID: afebrile, no leukocytosis, continue ceftriaxone for UTI (5/6 days)    OTHER:     DISPOSITION: AR as per PMR eval once stable and workup is complete     CORE MEASURES:        Admission NIHSS: 11     TPA:  NO      LDL/HDL:  86/71     Depression Screen: 0     Statin Therapy: not in setting of IPH     Dysphagia Screen:  FAIL     Smoking NO      Afib  NO     [employer]  YES

## 2017-07-03 NOTE — PROGRESS NOTE ADULT - SUBJECTIVE AND OBJECTIVE BOX
CC no chest pain or sob       PHYSICAL EXAM:  T(C): 36.5 (07-03-17 @ 07:41), Max: 37.1 (07-03-17 @ 04:50)  HR: 74 (07-03-17 @ 07:41) (60 - 74)  BP: 141/66 (07-03-17 @ 07:41) (133/76 - 190/84)  RR: 20 (07-03-17 @ 07:41) (18 - 20)  SpO2: 98% (07-03-17 @ 07:41) (97% - 99%)  Wt(kg): --  I&O's Summary    02 Jul 2017 07:01  -  03 Jul 2017 07:00  --------------------------------------------------------  IN: 1540 mL / OUT: 0 mL / NET: 1540 mL        Appearance: Normal	  Cardiovascular: Normal S1 S2,RRR, No JVD,  Respiratory: Lungs clear to auscultation	  Gastrointestinal:  Soft, Non-tender, + BS	+ NG tube   Extremities: Normal range of motion, No clubbing, cyanosis or edema        MEDICATIONS  (STANDING):  nystatin Cream 1 Application(s) Topical every 8 hours  cefTRIAXone   IVPB   IV Intermittent   cefTRIAXone   IVPB 1 Gram(s) IV Intermittent every 24 hours  lisinopril 40 milliGRAM(s) Oral daily  levothyroxine Injectable 25 MICROGram(s) IV Push daily  enoxaparin Injectable 40 milliGRAM(s) SubCutaneous daily  metoprolol 25 milliGRAM(s) Oral two times a day      TELEMETRY: NSR 	          LABS:	                             13.3   4.0   )-----------( CLUMPED    ( 01 Jul 2017 10:37 )             38.7     07-01    135  |  103  |  12  ----------------------------<  107<H>  3.9   |  17<L>  |  0.70    Ca    8.2<L>      01 Jul 2017 10:34

## 2017-07-03 NOTE — PROGRESS NOTE ADULT - ASSESSMENT
82 year old Female with HTN/MI/ ETOH abuse admitted with acute hemorrhagic CVA  / UTI/   now dysphagia     1. cv stable no chest pain or sob.  no events on tele   no decompensated CHF on exam   f.u TTE  patient optimized from a cv standpoint to proceed for peg placement with Low cardiac risk    2. Acute hemorrhagic CVA   presumed hypertensive hemorrhage  Neuro f/u - f/u  EEG results     3. HTN; chronic    goal for BP < 160/90 per neuro  current BP acceptable   resume home BP meds when able to tolerate PO     4. Dysphagia  s/p egd +esophageal stricture  + NG tube   plan for IR for  Peg placement     5.UTI   med f/u   continue with IV abx     6. DVT PPX

## 2017-07-03 NOTE — CHART NOTE - NSCHARTNOTEFT_GEN_A_CORE
Source: Patient [x ]    Family [ ]     other [x ] chart review, RN    Diet : Jevity 1.2 @ 60 ml/hr x 24 hours/day, currently NPO for PEG placement      Patient with no complaints of GI distress, with some complaints of dry mouth, last BM 7/2.      Enteral /Parenteral Nutrition: previously infusing at goal of 60 ml/hr without GI intolerance, currently held for procedure      Current Weight: Weight (kg): 81.6 (06-26 @ 17:55) no new weight  % Weight Change    Pertinent Medications: MEDICATIONS  (STANDING):  levothyroxine Injectable 25 MICROGram(s) IV Push daily    MEDICATIONS  (PRN):  ondansetron    Tablet 4 milliGRAM(s) Oral every 6 hours PRN Nausea and/or Vomiting    Pertinent Labs:  07-03 Na141 mmol/L Glu 103 mg/dL<H> K+ 4.2 mmol/L Cr  0.71 mg/dL BUN 12 mg/dL     Skin: no edema    Estimated Needs:   [x ] no change since previous assessment  [ ] recalculated:     hospital course: patient with hypertensive ICH, plan for PEG as patient failed MBS    Previous Nutrition Diagnosis:  Swallowing difficulty remains, to be addressed with enteral feeds    New Nutrition Diagnosis: [ x] not applicable    Related to:      As evidenced by:      Interventions:     Recommend    [ ] Change Diet To:    [ ] Nutrition Supplement    [x ] Nutrition Support: resume Jevity 1.2 @ 60 ml/hr x 24 hours/day to provide 1728 calories, 80 g protein: 21 calories/kg, 1.0 g protein/kg of dosing wt 81.6 kg    [ ] Other:        Monitoring and Evaluation:     [ ] PO intake [x ] Tolerance to diet prescription [x ] weights x[ ] follow up per protocol    [ ] other:

## 2017-07-03 NOTE — PROGRESS NOTE ADULT - SUBJECTIVE AND OBJECTIVE BOX
THE PATIENT WAS SEEN AND EXAMINED BY ME WITH THE HOUSESTAFF AND STROKE TEAM DURING MORNING ROUNDS.   HPI: an 82y old RH woman with PMH HTN, GERD, ETOH abuse and on ASA who presented with a chief complaint of left sided weakness. No tPA given due to IPH on CTH.  At baseline walks with cane and has aide at home to assist with ADLs    MEDICATIONS    allopurinol, ASA, Citalopram, metoprolol, famotidine, synthroid, atorvastatin      SOCIAL HISTORY:  No history of tobacco or alcohol use     Allergies    No Known Allergies    Intolerances (26 Jun 2017 14:34)      SUBJECTIVE: No events overnight.  No new neurologic complaints.      hydrALAZINE Injectable 10 milliGRAM(s) IV Push every 6 hours PRN  nystatin Cream 1 Application(s) Topical every 8 hours  ondansetron    Tablet 4 milliGRAM(s) Oral every 6 hours PRN  cefTRIAXone   IVPB   IV Intermittent   cefTRIAXone   IVPB 1 Gram(s) IV Intermittent every 24 hours  lisinopril 40 milliGRAM(s) Oral daily  levothyroxine Injectable 25 MICROGram(s) IV Push daily  enoxaparin Injectable 40 milliGRAM(s) SubCutaneous daily  LORazepam     Tablet 1 milliGRAM(s) Oral every 8 hours PRN  acetaminophen    Suspension. 650 milliGRAM(s) Oral every 6 hours PRN  metoprolol 25 milliGRAM(s) Oral two times a day  acetaminophen  IVPB. 1000 milliGRAM(s) IV Intermittent once      PHYSICAL EXAM:   Vital Signs Last 24 Hrs  T(C): 36.4 (03 Jul 2017 12:46), Max: 37.1 (03 Jul 2017 04:50)  T(F): 97.6 (03 Jul 2017 12:46), Max: 98.7 (03 Jul 2017 04:50)  HR: 77 (03 Jul 2017 12:46) (60 - 77)  BP: 151/80 (03 Jul 2017 12:53) (133/76 - 190/84)  BP(mean): --  RR: 26 (03 Jul 2017 12:46) (18 - 26)  SpO2: 93% (03 Jul 2017 12:46) (93% - 99%)  General: No acute distress  HEENT: EOM intact, visual fields full  Abdomen: Soft, nontender, nondistended   Extremities: No edema    NEUROLOGICAL EXAM:  Mental status: eyes open, alert, oriented name, age and hospital, fluent speech, no neglect, perseverates in telling stories, able to follow commands   Cranial Nerves: Subtle left facial asymmetry, no nystagmus, moderate dysarthria,  Motor exam: Normal tone, no drift,  RUE 5/5,  RLE 5/5, Left hemiplegia with trace effort against gravity LUE 3-/5 and 3/5 LLE anterior tib, proximally 0/5 LLE  Sensation: Diminished on left side   Coordination/ Gait: No dysmetria, gait not assessed       LABS:                        15.5   5.0   )-----------( 168      ( 03 Jul 2017 10:36 )             47.0    07-03    141  |  105  |  12  ----------------------------<  103<H>  4.2   |  20<L>  |  0.71    Ca    9.8      03 Jul 2017 10:36          IMAGING: Reviewed by me. THE PATIENT WAS SEEN AND EXAMINED BY ME WITH THE HOUSESTAFF AND STROKE TEAM DURING MORNING ROUNDS.   HPI: an 82y old RH woman with PMH HTN, GERD, ETOH abuse and on ASA who presented with a chief complaint of left sided weakness. No tPA given due to IPH on CTH.  At baseline walks with cane and has aide at home to assist with ADLs    MEDICATIONS    allopurinol, ASA, Citalopram, metoprolol, famotidine, synthroid, atorvastatin      SOCIAL HISTORY:  No history of tobacco or alcohol use     Allergies    No Known Allergies    Intolerances (26 Jun 2017 14:34)      SUBJECTIVE: No events overnight.  No new neurologic complaints.      hydrALAZINE Injectable 10 milliGRAM(s) IV Push every 6 hours PRN  nystatin Cream 1 Application(s) Topical every 8 hours  ondansetron    Tablet 4 milliGRAM(s) Oral every 6 hours PRN  cefTRIAXone   IVPB   IV Intermittent   cefTRIAXone   IVPB 1 Gram(s) IV Intermittent every 24 hours  lisinopril 40 milliGRAM(s) Oral daily  levothyroxine Injectable 25 MICROGram(s) IV Push daily  enoxaparin Injectable 40 milliGRAM(s) SubCutaneous daily  LORazepam     Tablet 1 milliGRAM(s) Oral every 8 hours PRN  acetaminophen    Suspension. 650 milliGRAM(s) Oral every 6 hours PRN  metoprolol 25 milliGRAM(s) Oral two times a day  acetaminophen  IVPB. 1000 milliGRAM(s) IV Intermittent once      PHYSICAL EXAM:   Vital Signs Last 24 Hrs  T(C): 36.4 (03 Jul 2017 12:46), Max: 37.1 (03 Jul 2017 04:50)  T(F): 97.6 (03 Jul 2017 12:46), Max: 98.7 (03 Jul 2017 04:50)  HR: 77 (03 Jul 2017 12:46) (60 - 77)  BP: 151/80 (03 Jul 2017 12:53) (133/76 - 190/84)  BP(mean): --  RR: 26 (03 Jul 2017 12:46) (18 - 26)  SpO2: 93% (03 Jul 2017 12:46) (93% - 99%)  General: No acute distress  HEENT: EOM intact, visual fields full  Abdomen: Soft, nontender, nondistended   Extremities: No edema    NEUROLOGICAL EXAM:  Mental status: eyes open, alert, oriented name, age and hospital, fluent speech, no neglect, perseverates in telling stories, able to follow commands   Cranial Nerves: Subtle left facial asymmetry, no nystagmus, moderate dysarthria,  Motor exam: Normal tone, no drift,  RUE 5/5,  RLE 5/5, Left hemiplegia with trace effort against gravity LUE 3-/5 and 3/5 LLE anterior tib, proximally 0/5 LLE  Sensation: Diminished on left side   Coordination/ Gait: No dysmetria, gait not assessed       LABS:                        15.5   5.0   )-----------( 168      ( 03 Jul 2017 10:36 )             47.0    07-03    141  |  105  |  12  ----------------------------<  103<H>  4.2   |  20<L>  |  0.71    Ca    9.8      03 Jul 2017 10:36          IMAGING: Reviewed by me.     CT Head No Cont/ CERVICAL SPINE CT Head No Cont (06.26.17 @ 14:29) > Noncontrast CT brain: Acute right intraparenchymal hemorrhage  Acute intraparenchymal hemorrhage just lateral to the right thalamic body which measures 2.7 x 1.5 x 1.9 cm (AP x TV x CC). There is no midline shift or central herniation. CT cervicalspine:  No evidence for acute displaced fracture or traumatic malalignment. Cervical degenerative spondylosis  CT Head No Cont (06.27.17 @ 11:39) >Grossly stable 2.9 x 1.9 cm parenchymal hemorrhage in the region of the posterior right lentiform nucleus.  No midline shift or hydrocephalus. MRA Head w/o Cont (06.28.17 @ 23:37) > Right thalamocapsular region hemorrhage as seen on the prior CT. No abnormal enhancement to suggest an underlying lesion.  Suspicion of a moderate stenosis approximately 2 cm distal to the origin of the right internal carotid artery. Atherosclerotic irregularity at the level of the left internal carotid artery is suggested.  < from: CT Abdomen and Pelvis No Cont (07.02.17 @ 19:38) >Cholelithiasis.Diverticulosis.  Age-indeterminate compression deformity of L1. Chronic compression deformity of T11.  A 1.8 cm low-attenuation right anterior lower pelvic subcutaneous nodule may represent a sebaceous cyst. Clinical correlation is suggested. THE PATIENT WAS SEEN AND EXAMINED BY ME WITH THE HOUSESTAFF AND STROKE TEAM DURING MORNING ROUNDS.   HPI: an 82y old RH woman with PMH HTN, GERD, ETOH abuse and on ASA who presented with a chief complaint of left sided weakness. No tPA given due to IPH on CTH.  At baseline walks with cane and has aide at home to assist with ADLs    MEDICATIONS    allopurinol, ASA, Citalopram, metoprolol, famotidine, synthroid, atorvastatin      SOCIAL HISTORY:  No history of tobacco or alcohol use     Allergies    No Known Allergies    Intolerances (26 Jun 2017 14:34)      SUBJECTIVE: No events overnight.  No new neurologic complaints.      hydrALAZINE Injectable 10 milliGRAM(s) IV Push every 6 hours PRN  nystatin Cream 1 Application(s) Topical every 8 hours  ondansetron    Tablet 4 milliGRAM(s) Oral every 6 hours PRN  cefTRIAXone   IVPB   IV Intermittent   cefTRIAXone   IVPB 1 Gram(s) IV Intermittent every 24 hours  lisinopril 40 milliGRAM(s) Oral daily  levothyroxine Injectable 25 MICROGram(s) IV Push daily  enoxaparin Injectable 40 milliGRAM(s) SubCutaneous daily  LORazepam     Tablet 1 milliGRAM(s) Oral every 8 hours PRN  acetaminophen    Suspension. 650 milliGRAM(s) Oral every 6 hours PRN  metoprolol 25 milliGRAM(s) Oral two times a day  acetaminophen  IVPB. 1000 milliGRAM(s) IV Intermittent once      PHYSICAL EXAM:   Vital Signs Last 24 Hrs  T(C): 36.4 (03 Jul 2017 12:46), Max: 37.1 (03 Jul 2017 04:50)  T(F): 97.6 (03 Jul 2017 12:46), Max: 98.7 (03 Jul 2017 04:50)  HR: 77 (03 Jul 2017 12:46) (60 - 77)  BP: 151/80 (03 Jul 2017 12:53) (133/76 - 190/84)  BP(mean): --  RR: 26 (03 Jul 2017 12:46) (18 - 26)  SpO2: 93% (03 Jul 2017 12:46) (93% - 99%)  General: No acute distress  HEENT: EOM intact, visual fields full  Abdomen: Soft, nontender, nondistended   Extremities: No edema    NEUROLOGICAL EXAM:  Mental status: eyes open, alert, oriented name, age and hospital, fluent speech, no neglect, perseverates in telling stories, able to follow commands   Cranial Nerves: Subtle left facial asymmetry, no nystagmus, moderate dysarthria,  Motor exam: Normal tone, no drift,  RUE 5/5,  RLE 5/5, Left hemiplegia with some effort against gravity LUE 3-/5 and 3/5 LLE anterior tib, proximally 0/5 LLE  Sensation: Diminished on left side   Coordination/ Gait: No dysmetria, gait not assessed       LABS:                        15.5   5.0   )-----------( 168      ( 03 Jul 2017 10:36 )             47.0    07-03    141  |  105  |  12  ----------------------------<  103<H>  4.2   |  20<L>  |  0.71    Ca    9.8      03 Jul 2017 10:36          IMAGING: Reviewed by me.     CT Head No Cont/ CERVICAL SPINE CT Head No Cont (06.26.17 @ 14:29) > Noncontrast CT brain: Acute right intraparenchymal hemorrhage  Acute intraparenchymal hemorrhage just lateral to the right thalamic body which measures 2.7 x 1.5 x 1.9 cm (AP x TV x CC). There is no midline shift or central herniation. CT cervicalspine:  No evidence for acute displaced fracture or traumatic malalignment. Cervical degenerative spondylosis  CT Head No Cont (06.27.17 @ 11:39) >Grossly stable 2.9 x 1.9 cm parenchymal hemorrhage in the region of the posterior right lentiform nucleus.  No midline shift or hydrocephalus. MRA Head w/o Cont (06.28.17 @ 23:37) > Right thalamocapsular region hemorrhage as seen on the prior CT. No abnormal enhancement to suggest an underlying lesion.  Suspicion of a moderate stenosis approximately 2 cm distal to the origin of the right internal carotid artery. Atherosclerotic irregularity at the level of the left internal carotid artery is suggested.  < from: CT Abdomen and Pelvis No Cont (07.02.17 @ 19:38) >Cholelithiasis.Diverticulosis.  Age-indeterminate compression deformity of L1. Chronic compression deformity of T11.  A 1.8 cm low-attenuation right anterior lower pelvic subcutaneous nodule may represent a sebaceous cyst. Clinical correlation is suggested.

## 2017-07-04 LAB
APTT BLD: 33.4 SEC — SIGNIFICANT CHANGE UP (ref 27.5–37.4)
INR BLD: 0.91 RATIO — SIGNIFICANT CHANGE UP (ref 0.88–1.16)
PROTHROM AB SERPL-ACNC: 10.3 SEC — SIGNIFICANT CHANGE UP (ref 10–13.1)

## 2017-07-04 PROCEDURE — 95957 EEG DIGITAL ANALYSIS: CPT | Mod: 26

## 2017-07-04 PROCEDURE — 95819 EEG AWAKE AND ASLEEP: CPT | Mod: 26

## 2017-07-04 PROCEDURE — 99233 SBSQ HOSP IP/OBS HIGH 50: CPT

## 2017-07-04 RX ORDER — KETOROLAC TROMETHAMINE 30 MG/ML
15 SYRINGE (ML) INJECTION ONCE
Qty: 0 | Refills: 0 | Status: DISCONTINUED | OUTPATIENT
Start: 2017-07-04 | End: 2017-07-04

## 2017-07-04 RX ADMIN — NYSTATIN CREAM 1 APPLICATION(S): 100000 CREAM TOPICAL at 05:50

## 2017-07-04 RX ADMIN — NYSTATIN CREAM 1 APPLICATION(S): 100000 CREAM TOPICAL at 21:59

## 2017-07-04 RX ADMIN — ONDANSETRON 4 MILLIGRAM(S): 8 TABLET, FILM COATED ORAL at 06:00

## 2017-07-04 RX ADMIN — Medication 650 MILLIGRAM(S): at 04:00

## 2017-07-04 RX ADMIN — ENOXAPARIN SODIUM 40 MILLIGRAM(S): 100 INJECTION SUBCUTANEOUS at 12:34

## 2017-07-04 RX ADMIN — Medication 10 MILLIGRAM(S): at 00:15

## 2017-07-04 RX ADMIN — Medication 25 MICROGRAM(S): at 05:49

## 2017-07-04 RX ADMIN — Medication 25 MILLIGRAM(S): at 17:32

## 2017-07-04 RX ADMIN — Medication 650 MILLIGRAM(S): at 03:10

## 2017-07-04 RX ADMIN — LISINOPRIL 40 MILLIGRAM(S): 2.5 TABLET ORAL at 05:50

## 2017-07-04 RX ADMIN — Medication 25 MILLIGRAM(S): at 05:50

## 2017-07-04 RX ADMIN — Medication 10 MILLIGRAM(S): at 16:35

## 2017-07-04 RX ADMIN — Medication 15 MILLIGRAM(S): at 06:55

## 2017-07-04 RX ADMIN — NYSTATIN CREAM 1 APPLICATION(S): 100000 CREAM TOPICAL at 15:00

## 2017-07-04 RX ADMIN — CEFTRIAXONE 100 GRAM(S): 500 INJECTION, POWDER, FOR SOLUTION INTRAMUSCULAR; INTRAVENOUS at 11:58

## 2017-07-04 NOTE — PROGRESS NOTE ADULT - SUBJECTIVE AND OBJECTIVE BOX
THE PATIENT WAS SEEN AND EXAMINED BY ME WITH THE HOUSESTAFF AND STROKE TEAM DURING MORNING ROUNDS.   HPI:  an 82y old RH woman with PMH HTN, GERD, ETOH abuse and on ASA who presented with a chief complaint of left sided weakness. No tPA given due to IPH on CTH.  At baseline walks with cane and has aide at home to assist with ADLs        MEDICATIONS    allopurinol, ASA, Citalopram, metoprolol, famotidine, synthroid, atorvastatin      SOCIAL HISTORY:  No history of tobacco or alcohol use     Allergies    No Known Allergies    Intolerances (26 Jun 2017 14:34)      SUBJECTIVE: No events overnight.  c/o headache and nausea and weakness in arm      hydrALAZINE Injectable 10 milliGRAM(s) IV Push every 6 hours PRN  nystatin Cream 1 Application(s) Topical every 8 hours  ondansetron    Tablet 4 milliGRAM(s) Oral every 6 hours PRN  cefTRIAXone   IVPB   IV Intermittent   cefTRIAXone   IVPB 1 Gram(s) IV Intermittent every 24 hours  lisinopril 40 milliGRAM(s) Oral daily  levothyroxine Injectable 25 MICROGram(s) IV Push daily  enoxaparin Injectable 40 milliGRAM(s) SubCutaneous daily  LORazepam     Tablet 1 milliGRAM(s) Oral every 8 hours PRN  acetaminophen    Suspension. 650 milliGRAM(s) Oral every 6 hours PRN  metoprolol 25 milliGRAM(s) Oral two times a day      PHYSICAL EXAM:   Vital Signs Last 24 Hrs  T(C): 36.8 (04 Jul 2017 05:23), Max: 37 (03 Jul 2017 23:11)  T(F): 98.2 (04 Jul 2017 05:23), Max: 98.6 (03 Jul 2017 23:11)  HR: 80 (04 Jul 2017 05:23) (64 - 91)  BP: 162/69 (04 Jul 2017 05:23) (123/64 - 177/80)  BP(mean): --  RR: 18 (04 Jul 2017 05:23) (18 - 26)  SpO2: 98% (04 Jul 2017 05:23) (93% - 98%)      General: No acute distress  HEENT: EOM intact, visual fields full  Abdomen: Soft, nontender, nondistended   Extremities: No edema    NEUROLOGICAL EXAM:  Mental status: eyes open, alert, oriented name, age and hospital, fluent speech, no neglect, perseverates in telling stories, able to follow commands   Cranial Nerves: Subtle left facial asymmetry, no nystagmus, moderate dysarthria,  Motor exam: Normal tone, no drift,  RUE 5/5,  RLE 5/5, Left hemiplegia with some effort against gravity LUE 3-/5 and 3/5 LLE anterior tib, proximally 0/5 LLE  Sensation: Diminished on left side   Coordination/ Gait: No dysmetria, gait not assessed   LABS:                        15.5   5.0   )-----------( 168      ( 03 Jul 2017 10:36 )             47.0    07-03    141  |  105  |  12  ----------------------------<  103<H>  4.2   |  20<L>  |  0.71    Ca    9.8      03 Jul 2017 10:36      Hemoglobin A1C, Whole Blood: 5.5 % (07-03 @ 12:08)      IMAGING: Reviewed by me.   CT Head No Cont/ CERVICAL SPINE CT Head No Cont (06.26.17 @ 14:29) > Noncontrast CT brain: Acute right intraparenchymal hemorrhage  Acute intraparenchymal hemorrhage just lateral to the right thalamic body which measures 2.7 x 1.5 x 1.9 cm (AP x TV x CC). There is no midline shift or central herniation. CT cervicalspine:  No evidence for acute displaced fracture or traumatic malalignment. Cervical degenerative spondylosis  CT Head No Cont (06.27.17 @ 11:39) >Grossly stable 2.9 x 1.9 cm parenchymal hemorrhage in the region of the posterior right lentiform nucleus.  No midline shift or hydrocephalus. MRA Head w/o Cont (06.28.17 @ 23:37) > Right thalamocapsular region hemorrhage as seen on the prior CT. No abnormal enhancement to suggest an underlying lesion.  Suspicion of a moderate stenosis approximately 2 cm distal to the origin of the right internal carotid artery. Atherosclerotic irregularity at the level of the left internal carotid artery is suggested.  < from: CT Abdomen and Pelvis No Cont (07.02.17 @ 19:38) >Cholelithiasis.Diverticulosis.  Age-indeterminate compression deformity of L1. Chronic compression deformity of T11.  A 1.8 cm low-attenuation right anterior lower pelvic subcutaneous nodule may represent a sebaceous cyst. Clinical correlation is suggested. THE PATIENT WAS SEEN AND EXAMINED BY ME WITH THE HOUSESTAFF AND STROKE TEAM DURING MORNING ROUNDS.   HPI:  an 82y old RH woman with PMH HTN, GERD, ETOH abuse and on ASA who presented with a chief complaint of left sided weakness. No tPA given due to IPH on CTH.  At baseline walks with cane and has aide at home to assist with ADLs        MEDICATIONS    allopurinol, ASA, Citalopram, metoprolol, famotidine, synthroid, atorvastatin      SOCIAL HISTORY:  No history of tobacco or alcohol use     Allergies    No Known Allergies    Intolerances (26 Jun 2017 14:34)      SUBJECTIVE: No events overnight.  c/o headache and nausea and weakness in arm      hydrALAZINE Injectable 10 milliGRAM(s) IV Push every 6 hours PRN  nystatin Cream 1 Application(s) Topical every 8 hours  ondansetron    Tablet 4 milliGRAM(s) Oral every 6 hours PRN  cefTRIAXone   IVPB   IV Intermittent   cefTRIAXone   IVPB 1 Gram(s) IV Intermittent every 24 hours  lisinopril 40 milliGRAM(s) Oral daily  levothyroxine Injectable 25 MICROGram(s) IV Push daily  enoxaparin Injectable 40 milliGRAM(s) SubCutaneous daily  LORazepam     Tablet 1 milliGRAM(s) Oral every 8 hours PRN  acetaminophen    Suspension. 650 milliGRAM(s) Oral every 6 hours PRN  metoprolol 25 milliGRAM(s) Oral two times a day      PHYSICAL EXAM:   Vital Signs Last 24 Hrs  T(C): 36.8 (04 Jul 2017 05:23), Max: 37 (03 Jul 2017 23:11)  T(F): 98.2 (04 Jul 2017 05:23), Max: 98.6 (03 Jul 2017 23:11)  HR: 80 (04 Jul 2017 05:23) (64 - 91)  BP: 162/69 (04 Jul 2017 05:23) (123/64 - 177/80)  BP(mean): --  RR: 18 (04 Jul 2017 05:23) (18 - 26)  SpO2: 98% (04 Jul 2017 05:23) (93% - 98%)      General: No acute distress  HEENT: EOM intact, visual fields full  Abdomen: Soft, nontender, nondistended   Extremities: No edema    NEUROLOGICAL EXAM:  Mental status: eyes open, alert, oriented name, age and hospital, fluent speech, no neglect, perseverates in telling stories, able to follow commands   Cranial Nerves: Subtle left facial palsy, no nystagmus, moderate dysarthria,  Motor exam: Normal tone, no drift,  RUE 5/5,  RLE 5/5, Left hemiplegia with some effort against gravity LUE proximally  3-/5 and LLE anterior tib 3/5,, proximally 0/5 LLE  Sensation: Diminished on left side   Coordination/ Gait: No dysmetria, gait not assessed   LABS:                        15.5   5.0   )-----------( 168      ( 03 Jul 2017 10:36 )             47.0    07-03    141  |  105  |  12  ----------------------------<  103<H>  4.2   |  20<L>  |  0.71    Ca    9.8      03 Jul 2017 10:36      Hemoglobin A1C, Whole Blood: 5.5 % (07-03 @ 12:08)      IMAGING: Reviewed by me.   CT Head No Cont/ CERVICAL SPINE CT Head No Cont (06.26.17 @ 14:29) > Noncontrast CT brain: Acute right intraparenchymal hemorrhage  Acute intraparenchymal hemorrhage just lateral to the right thalamic body which measures 2.7 x 1.5 x 1.9 cm (AP x TV x CC). There is no midline shift or central herniation. CT cervicalspine:  No evidence for acute displaced fracture or traumatic malalignment. Cervical degenerative spondylosis  CT Head No Cont (06.27.17 @ 11:39) >Grossly stable 2.9 x 1.9 cm parenchymal hemorrhage in the region of the posterior right lentiform nucleus.  No midline shift or hydrocephalus. MRA Head w/o Cont (06.28.17 @ 23:37) > Right thalamocapsular region hemorrhage as seen on the prior CT. No abnormal enhancement to suggest an underlying lesion.  Suspicion of a moderate stenosis approximately 2 cm distal to the origin of the right internal carotid artery. Atherosclerotic irregularity at the level of the left internal carotid artery is suggested.  < from: CT Abdomen and Pelvis No Cont (07.02.17 @ 19:38) >Cholelithiasis.Diverticulosis.  Age-indeterminate compression deformity of L1. Chronic compression deformity of T11.  A 1.8 cm low-attenuation right anterior lower pelvic subcutaneous nodule may represent a sebaceous cyst. Clinical correlation is suggested.

## 2017-07-04 NOTE — PROGRESS NOTE ADULT - ASSESSMENT
ASSESSMENT: This is a 82y woman with HTN presents with R BG presumed hypertensive hemorrhage. Impression: R hypertensive ICH.     NEURO: Neuro exam unchanged, Episode of facial twitching on 06/29,  EEG technically difficult due to hardware noise, recommend repeat EEG. Continue close monitoring for neurologic deterioration, titrate to normotension, titrate statin to LDL goal less than 70, hx of ETOH abuse, psych consult appreciated, Physical therapy/OT recommend AR, continue current rehab treatment.     ANTITHROMBOTIC THERAPY: none in setting of IPH    PULMONARY:  protecting airway, saturating well     CARDIOVASCULAR:  cardiac monitoring without events, resumed home BP meds.   SBP goal: normotension    GASTROINTESTINAL:  dysphagia screen by SPL failed, MBS done, failed, discussed with family re: NGT and PEG, patient does not seem to retain explanations given and not understanding why she can't eat.  GI attempted PEG and unable to pass scope due to esophageal stricture and recommended IR placement of PEG tube.  IR called to evaluate CT with minimum barium and assess if there is a need for repeat and if they see a window to place gastrostomy tube possibly on 7/5/17.  BM X # 07/02 obtained abd Xray still which shows minimum barium still present, will continue bowel regimen.   NGT in place, feeds were on hold pending g-tube placement, awaiting call from IR to schedule,        Diet: NGT with Jevity, tolerating feedings    RENAL: BUN/Cr stable, good urine output      Na Goal: Greater than 135     Basurto: No    HEMATOLOGY: H/H  Platelets stable, no s/s of bleeding     DVT ppx: LMWH    ID: afebrile, no leukocytosis, continue ceftriaxone for UTI (5/6 days)    OTHER:     DISPOSITION: AR as per PMR eval once stable and workup is complete     CORE MEASURES:        Admission NIHSS: 11     TPA:  NO      LDL/HDL:  86/71     Depression Screen: 0     Statin Therapy: not in setting of IPH     Dysphagia Screen:  FAIL     Smoking NO      Afib  NO     [employer]  YES

## 2017-07-05 PROCEDURE — 93306 TTE W/DOPPLER COMPLETE: CPT | Mod: 26

## 2017-07-05 PROCEDURE — 73030 X-RAY EXAM OF SHOULDER: CPT | Mod: 26,LT

## 2017-07-05 PROCEDURE — 99233 SBSQ HOSP IP/OBS HIGH 50: CPT

## 2017-07-05 PROCEDURE — 95957 EEG DIGITAL ANALYSIS: CPT | Mod: 26

## 2017-07-05 PROCEDURE — 49440 PLACE GASTROSTOMY TUBE PERC: CPT

## 2017-07-05 PROCEDURE — 95951: CPT | Mod: 26

## 2017-07-05 RX ORDER — LEVETIRACETAM 250 MG/1
500 TABLET, FILM COATED ORAL EVERY 12 HOURS
Qty: 0 | Refills: 0 | Status: DISCONTINUED | OUTPATIENT
Start: 2017-07-05 | End: 2017-07-12

## 2017-07-05 RX ORDER — DIPHENHYDRAMINE HCL 50 MG
25 CAPSULE ORAL ONCE
Qty: 0 | Refills: 0 | Status: COMPLETED | OUTPATIENT
Start: 2017-07-05 | End: 2017-07-05

## 2017-07-05 RX ORDER — DIPHENHYDRAMINE HCL 50 MG
25 CAPSULE ORAL ONCE
Qty: 0 | Refills: 0 | Status: DISCONTINUED | OUTPATIENT
Start: 2017-07-05 | End: 2017-07-05

## 2017-07-05 RX ORDER — ACETAMINOPHEN 500 MG
1000 TABLET ORAL ONCE
Qty: 0 | Refills: 0 | Status: COMPLETED | OUTPATIENT
Start: 2017-07-05 | End: 2017-07-05

## 2017-07-05 RX ORDER — MORPHINE SULFATE 50 MG/1
1 CAPSULE, EXTENDED RELEASE ORAL ONCE
Qty: 0 | Refills: 0 | Status: DISCONTINUED | OUTPATIENT
Start: 2017-07-05 | End: 2017-07-05

## 2017-07-05 RX ORDER — AMLODIPINE BESYLATE 2.5 MG/1
5 TABLET ORAL DAILY
Qty: 0 | Refills: 0 | Status: DISCONTINUED | OUTPATIENT
Start: 2017-07-05 | End: 2017-07-07

## 2017-07-05 RX ORDER — DIPHENHYDRAMINE HCL 50 MG
50 CAPSULE ORAL ONCE
Qty: 0 | Refills: 0 | Status: DISCONTINUED | OUTPATIENT
Start: 2017-07-05 | End: 2017-07-05

## 2017-07-05 RX ADMIN — Medication 650 MILLIGRAM(S): at 15:17

## 2017-07-05 RX ADMIN — Medication 25 MILLIGRAM(S): at 20:45

## 2017-07-05 RX ADMIN — Medication 10 MILLIGRAM(S): at 01:06

## 2017-07-05 RX ADMIN — Medication 400 MILLIGRAM(S): at 01:30

## 2017-07-05 RX ADMIN — AMLODIPINE BESYLATE 5 MILLIGRAM(S): 2.5 TABLET ORAL at 12:20

## 2017-07-05 RX ADMIN — CEFTRIAXONE 100 GRAM(S): 500 INJECTION, POWDER, FOR SOLUTION INTRAMUSCULAR; INTRAVENOUS at 09:10

## 2017-07-05 RX ADMIN — NYSTATIN CREAM 1 APPLICATION(S): 100000 CREAM TOPICAL at 06:01

## 2017-07-05 RX ADMIN — Medication 25 MICROGRAM(S): at 05:18

## 2017-07-05 RX ADMIN — Medication 25 MILLIGRAM(S): at 12:41

## 2017-07-05 RX ADMIN — NYSTATIN CREAM 1 APPLICATION(S): 100000 CREAM TOPICAL at 21:53

## 2017-07-05 RX ADMIN — NYSTATIN CREAM 1 APPLICATION(S): 100000 CREAM TOPICAL at 13:05

## 2017-07-05 RX ADMIN — Medication 1000 MILLIGRAM(S): at 01:59

## 2017-07-05 NOTE — PROGRESS NOTE ADULT - SUBJECTIVE AND OBJECTIVE BOX
THE PATIENT WAS SEEN AND EXAMINED BY ME WITH THE HOUSESTAFF AND STROKE TEAM DURING MORNING ROUNDS.   HPI:82y old RH woman with PMH HTN, GERD, ETOH abuse and on ASA who presented with a chief complaint of left sided weakness. No tPA given due to IPH on CTH.  At baseline walks with cane and has aide at home to assist with ADLs.  Admitted to stroke service for further workup and evaluation.     SUBJECTIVE: Complaint of facial itching and redness since 7/4.     hydrALAZINE Injectable 10 milliGRAM(s) IV Push every 6 hours PRN  nystatin Cream 1 Application(s) Topical every 8 hours  ondansetron    Tablet 4 milliGRAM(s) Oral every 6 hours PRN  cefTRIAXone   IVPB   IV Intermittent   cefTRIAXone   IVPB 1 Gram(s) IV Intermittent every 24 hours  lisinopril 40 milliGRAM(s) Oral daily  levothyroxine Injectable 25 MICROGram(s) IV Push daily  enoxaparin Injectable 40 milliGRAM(s) SubCutaneous daily  LORazepam     Tablet 1 milliGRAM(s) Oral every 8 hours PRN  acetaminophen    Suspension. 650 milliGRAM(s) Oral every 6 hours PRN  metoprolol 25 milliGRAM(s) Oral two times a day  amLODIPine   Tablet 5 milliGRAM(s) Oral daily      PHYSICAL EXAM:   Vital Signs Last 24 Hrs  T(C): 36.8 (05 Jul 2017 14:28), Max: 36.8 (05 Jul 2017 14:28)  T(F): 98.2 (05 Jul 2017 14:28), Max: 98.2 (05 Jul 2017 14:28)  HR: 70 (05 Jul 2017 14:28) (65 - 77)  BP: 167/99 (05 Jul 2017 14:28) (131/76 - 171/79)  BP(mean): --  RR: 18 (05 Jul 2017 14:28) (18 - 18)  SpO2: 96% (05 Jul 2017 14:28) (94% - 99%)    General: No acute distress, facial erythema   HEENT: EOM intact, visual fields full, no pharyngeal edema or tongue swelling visualized   Abdomen: Soft, nontender, nondistended   Extremities: No edema    NEUROLOGICAL EXAM:  Mental status: eyes open, alert, oriented name, age and hospital, fluent speech, no neglect, perseverates at times, able to follow commands   Cranial Nerves: Subtle left facial palsy, no nystagmus, moderate dysarthria, dysphagia,  Motor exam: RUE 5/5,  RLE 5/5, Left hemiplegia with some effort against gravity LUE proximally  3-/5 and LLE anterior tib 3/5,, proximally with left hip and knee flexion.  Sensation: Diminished on left side   Coordination/ Gait: No dysmetria, gait not assessed     LABS:       PT/INR - ( 04 Jul 2017 10:09 )   PT: 10.3 sec;   INR: 0.91 ratio         PTT - ( 04 Jul 2017 10:09 )  PTT:33.4 sec  Hemoglobin A1C, Whole Blood: 5.5 % (07-03 @ 12:08)      IMAGING: Reviewed by me.   CT Head No Cont/ CERVICAL SPINE CT Head No Cont (06.26.17 @ 14:29) > Noncontrast CT brain: Acute right intraparenchymal hemorrhage  Acute intraparenchymal hemorrhage just lateral to the right thalamic body which measures 2.7 x 1.5 x 1.9 cm (AP x TV x CC). There is no midline shift or central herniation. CT cervicalspine:  No evidence for acute displaced fracture or traumatic malalignment. Cervical degenerative spondylosis  CT Head No Cont (06.27.17 @ 11:39) >Grossly stable 2.9 x 1.9 cm parenchymal hemorrhage in the region of the posterior right lentiform nucleus.  No midline shift or hydrocephalus. MRA Head w/o Cont (06.28.17 @ 23:37) > Right thalamocapsular region hemorrhage as seen on the prior CT. No abnormal enhancement to suggest an underlying lesion.  Suspicion of a moderate stenosis approximately 2 cm distal to the origin of the right internal carotid artery. Atherosclerotic irregularity at the level of the left internal carotid artery is suggested.  < from: CT Abdomen and Pelvis No Cont (07.02.17 @ 19:38) >Cholelithiasis.Diverticulosis.  Age-indeterminate compression deformity of L1. Chronic compression deformity of T11.  A 1.8 cm low-attenuation right anterior lower pelvic subcutaneous nodule may represent a sebaceous cyst. Clinical correlation is suggested. THE PATIENT WAS SEEN AND EXAMINED BY ME WITH THE HOUSESTAFF AND STROKE TEAM DURING MORNING ROUNDS.   HPI:82y old RH woman with PMH HTN, GERD, ETOH abuse and on ASA who presented with a chief complaint of left sided weakness. No tPA given due to IPH on CTH.  At baseline walks with cane and has aide at home to assist with ADLs.  Admitted to stroke service for further workup and evaluation.     SUBJECTIVE: Complaint of facial itching and redness since 7/4.     hydrALAZINE Injectable 10 milliGRAM(s) IV Push every 6 hours PRN  nystatin Cream 1 Application(s) Topical every 8 hours  ondansetron    Tablet 4 milliGRAM(s) Oral every 6 hours PRN  cefTRIAXone   IVPB   IV Intermittent   cefTRIAXone   IVPB 1 Gram(s) IV Intermittent every 24 hours  lisinopril 40 milliGRAM(s) Oral daily  levothyroxine Injectable 25 MICROGram(s) IV Push daily  enoxaparin Injectable 40 milliGRAM(s) SubCutaneous daily  LORazepam     Tablet 1 milliGRAM(s) Oral every 8 hours PRN  acetaminophen    Suspension. 650 milliGRAM(s) Oral every 6 hours PRN  metoprolol 25 milliGRAM(s) Oral two times a day  amLODIPine   Tablet 5 milliGRAM(s) Oral daily      PHYSICAL EXAM:   Vital Signs Last 24 Hrs  T(C): 36.8 (05 Jul 2017 14:28), Max: 36.8 (05 Jul 2017 14:28)  T(F): 98.2 (05 Jul 2017 14:28), Max: 98.2 (05 Jul 2017 14:28)  HR: 70 (05 Jul 2017 14:28) (65 - 77)  BP: 167/99 (05 Jul 2017 14:28) (131/76 - 171/79)  BP(mean): --  RR: 18 (05 Jul 2017 14:28) (18 - 18)  SpO2: 96% (05 Jul 2017 14:28) (94% - 99%)    General: No acute distress, facial erythema   HEENT: EOM intact, visual fields full, no pharyngeal edema or tongue swelling visualized   Abdomen: Soft, nontender, nondistended   Extremities: No edema    NEUROLOGICAL EXAM:  Mental status: eyes open, alert, oriented name, age and hospital, fluent speech, no neglect, perseverates at times, able to follow commands   Cranial Nerves: Subtle left facial palsy, no nystagmus, moderate dysarthria, dysphagia,  Motor exam: RUE 5/5,  RLE 5/5, Left hemiparesis with some effort against gravity LUE proximally  3-/5 and LLE anterior tib 3/5,, proximally with partial left hip and knee flexion.  Sensation: Diminished on left side   Coordination/ Gait: No dysmetria, gait not assessed     LABS:       PT/INR - ( 04 Jul 2017 10:09 )   PT: 10.3 sec;   INR: 0.91 ratio         PTT - ( 04 Jul 2017 10:09 )  PTT:33.4 sec  Hemoglobin A1C, Whole Blood: 5.5 % (07-03 @ 12:08)      IMAGING: Reviewed by me.   CT Head No Cont/ CERVICAL SPINE CT Head No Cont (06.26.17 @ 14:29) > Noncontrast CT brain: Acute right intraparenchymal hemorrhage  Acute intraparenchymal hemorrhage just lateral to the right thalamic body which measures 2.7 x 1.5 x 1.9 cm (AP x TV x CC). There is no midline shift or central herniation. CT cervicalspine:  No evidence for acute displaced fracture or traumatic malalignment. Cervical degenerative spondylosis  CT Head No Cont (06.27.17 @ 11:39) >Grossly stable 2.9 x 1.9 cm parenchymal hemorrhage in the region of the posterior right lentiform nucleus.  No midline shift or hydrocephalus. MRA Head w/o Cont (06.28.17 @ 23:37) > Right thalamocapsular region hemorrhage as seen on the prior CT. No abnormal enhancement to suggest an underlying lesion.  Suspicion of a moderate stenosis approximately 2 cm distal to the origin of the right internal carotid artery. Atherosclerotic irregularity at the level of the left internal carotid artery is suggested.  < from: CT Abdomen and Pelvis No Cont (07.02.17 @ 19:38) >Cholelithiasis.Diverticulosis.  Age-indeterminate compression deformity of L1. Chronic compression deformity of T11.  A 1.8 cm low-attenuation right anterior lower pelvic subcutaneous nodule may represent a sebaceous cyst. Clinical correlation is suggested.

## 2017-07-05 NOTE — PROGRESS NOTE ADULT - SUBJECTIVE AND OBJECTIVE BOX
Interventional Radiology Pre-Procedure Note    This is a 82y Female has h/o intracranial hemorrhage and dysphagia requiring gastrostomy s/p failed attempt by GI requested for gastrostomy tube insertion.    Procedure:  Gastrostomy tube insertion    Diagnosis/Indication: Patient is a 82y old  Female who presents with a chief complaint of left sided weakness (27 Jun 2017 04:50)      PAST MEDICAL & SURGICAL HISTORY:  ETOH abuse  UTI (lower urinary tract infection)  Dyslipidemia  Gout  Personal history of asbestosis  HTN (hypertension)  MI (myocardial infarction)  History of benign breast tumor  History of left shoulder fracture     PT/INR - ( 04 Jul 2017 10:09 )   PT: 10.3 sec;   INR: 0.91 ratio         PTT - ( 04 Jul 2017 10:09 )  PTT:33.4 sec    Procedure/ risks/ benefits were explained, informed consent obtained from patient'f family.  I reviewed the CT and believe there is a window to perform percutaneous gastrostomy.  Will perform with anesthesiology.  Labs acceptable for this intervention.

## 2017-07-05 NOTE — PROGRESS NOTE ADULT - ASSESSMENT
ASSESSMENT: This is a 82y woman with HTN presents with R BG presumed hypertensive hemorrhage. Impression: R hypertensive ICH.     NEURO: Neuro exam unchanged, Episode of facial twitching on 06/29,  EEG with intermittent polymorphic slowing right hemisphere and right PLEDS, will consult epilepsy and anticipate starting AED given findings as noted above,  Continue close monitoring for neurologic deterioration in setting of stable cerebral edema with mass effect and brain compression, titrate to normotension, hx of ETOH abuse, psych consult appreciated, Physical therapy/OT recommend AR, continue current rehab treatment.     ANTITHROMBOTIC THERAPY: none in setting of IPH    PULMONARY:  protecting airway, saturating well     CARDIOVASCULAR:  cardiac monitoring without events, continue home cardiac regimen and titrate accordingly   SBP goal: normotension    GASTROINTESTINAL:  dysphagia screen by SPL failed, MBS done, failed, discussed per team with family re: PEG- wish to proceed, patient does not seem to retain explanations given and not understanding why she can't eat.  GI attempted PEG and unable to pass scope due to esophageal stricture and recommended IR placement of PEG tube.  IR called to evaluate CT and plan for possible PEG today 7/5.  NGT in place, feeds on hold pending g-tube placement.       Diet: NPO    RENAL: BUN/Cr without acute change, IVF while NPO, good urine output      Na Goal: Greater than 135     Basurto: No    HEMATOLOGY: H/H  without acute change, no s/s of bleeding     DVT ppx: LMWH    ID: afebrile, no leukocytosis, ceftriaxone course complete     OTHER: c/o itching and red face with itchy throat, only new medication which correlates with 7/4 start date of symptoms is toradol, IV benadryl given, continue to monitor, L shoulder x ray for pain, no pharyngeal edema or swelling noted, respirations unlabored, no respiratory compromise visualized, this was d/w IR team.     DISPOSITION: AR as per PMR eval once stable and workup is complete     CORE MEASURES:        Admission NIHSS: 11     TPA:  NO      LDL/HDL:  86/71     Depression Screen: 0     Statin Therapy: not in setting of IPH     Dysphagia Screen:  FAIL     Smoking NO      Afib  NO     [employer] Education  YES ASSESSMENT: This is a 82y woman with HTN presents with R BG presumed hypertensive hemorrhage. Impression: R hypertensive ICH.     NEURO: Neuro exam unchanged, Episode of facial twitching on 06/29,  EEG with intermittent polymorphic slowing right hemisphere and right PLEDS, will consult epilepsy and anticipate starting AED given findings as noted above,  Continue close monitoring for neurologic deterioration in setting of stable cerebral edema with mass effect and brain compression, titrate to normotension, hx of ETOH abuse, psych consult appreciated, Physical therapy/OT recommend AR, continue current rehab treatment.     ANTITHROMBOTIC THERAPY: none in setting of IPH    PULMONARY:  protecting airway, saturating well     CARDIOVASCULAR:  cardiac monitoring without events, continue home cardiac regimen and titrate accordingly   SBP goal: normotension    GASTROINTESTINAL:  dysphagia screen by SPL failed, MBS done, failed, discussed per team with family re: PEG- wish to proceed, patient does not seem to retain explanations given and not understanding why she can't eat.  GI attempted PEG and unable to pass scope due to esophageal stricture and recommended IR placement of PEG tube.  IR called to evaluate CT and plan for possible PEG today 7/5 but some difficulty contacting family for consent.  NGT in place, feeds on hold pending g-tube placement.       Diet: NPO    RENAL: BUN/Cr without acute change, IVF while NPO, good urine output      Na Goal: Greater than 135     Basurto: No    HEMATOLOGY: H/H  without acute change, no s/s of bleeding     DVT ppx: LMWH    ID: afebrile, no leukocytosis, ceftriaxone course complete     OTHER: c/o itching and red face with itchy throat, only new medication which correlates with 7/4 start date of symptoms is toradol, IV benadryl given, continue to monitor, L shoulder x ray for pain, no pharyngeal edema or swelling noted, respirations unlabored, no respiratory compromise visualized, this was d/w IR team.     DISPOSITION: AR as per PMR eval once stable and workup is complete     CORE MEASURES:        Admission NIHSS: 11     TPA:  NO      LDL/HDL:  86/71     Depression Screen: 0     Statin Therapy: not in setting of IPH     Dysphagia Screen:  FAIL     Smoking NO      Afib  NO     [employer] Education  YES

## 2017-07-05 NOTE — PROGRESS NOTE ADULT - SUBJECTIVE AND OBJECTIVE BOX
CARDIOLOGY FOLLOW UP     CC no chest pain or sob       PHYSICAL EXAM:  T(C): 36.4 (07-05-17 @ 07:42), Max: 36.8 (07-04-17 @ 13:00)  HR: 77 (07-05-17 @ 07:42) (65 - 78)  BP: 146/81 (07-05-17 @ 07:42) (131/76 - 171/79)  RR: 18 (07-05-17 @ 07:42) (18 - 18)  SpO2: 94% (07-05-17 @ 07:42) (94% - 99%)  Wt(kg): --  I&O's Summary    04 Jul 2017 07:01  -  05 Jul 2017 07:00  --------------------------------------------------------  IN: 440 mL / OUT: 0 mL / NET: 440 mL        Appearance: Normal	  Cardiovascular: Normal S1 S2,RRR, No JVD,  Respiratory: Lungs clear to auscultation	  Gastrointestinal:  Soft, Non-tender, + BS	+ NG tube   Extremities: Normal range of motion, No clubbing, cyanosis or edema      MEDICATIONS  (STANDING):  nystatin Cream 1 Application(s) Topical every 8 hours  cefTRIAXone   IVPB   IV Intermittent   cefTRIAXone   IVPB 1 Gram(s) IV Intermittent every 24 hours  lisinopril 40 milliGRAM(s) Oral daily  levothyroxine Injectable 25 MICROGram(s) IV Push daily  enoxaparin Injectable 40 milliGRAM(s) SubCutaneous daily  metoprolol 25 milliGRAM(s) Oral two times a day  morphine  - Injectable 1 milliGRAM(s) IV Push once      TELEMETRY: NSR 	    DIAGNOSTIC TESTING:  [ ] Echocardiogram:        LABS:	 	                          15.5   5.0   )-----------( 168      ( 03 Jul 2017 10:36 )             47.0     07-03    141  |  105  |  12  ----------------------------<  103<H>  4.2   |  20<L>  |  0.71    Ca    9.8      03 Jul 2017 10:36      PT/INR - ( 04 Jul 2017 10:09 )   PT: 10.3 sec;   INR: 0.91 ratio         PTT - ( 04 Jul 2017 10:09 )  PTT:33.4 sec

## 2017-07-05 NOTE — EEG REPORT - NS EEG TEXT BOX
[facility]  Report of Routine EEG with Video  And Report of DigitalCompressed Spectral Array Analysis    [facility]: [address] [facility], [address] [place] [address], Phone: [phone]  [facility]: [address], [place] [address], Phone: [phone]  Office: [address] [place] [address], [place] [address], [place] [address], Phone: [phone]    Patient Name: Michaelle Scott     Age: 82 y  : 1935  Patient ID: -, MRN #: MR# [id number], Location: [address] [place] [address]   Referring Physician: -    EEG #: [id number]  Study Date: 2017		    Technical Information:					  On Instrument: -  Placement and Labeling of Electrodes:  The EEG was performed utilizing 20 channels referential EEG connections (coronal over temporal over parasagittal montage) using all standard 10-20 electrode placements with EKG.  Recording was at a sampling rate of 256 samples per second per channel.  Time synchronized digital video recording was done simultaneously with EEG recording.  A low light infrared camera was used for low light recording.  García and seizure detection algorithms were utilized.  CSA Technical Component:  Quantitative EEG analysis using a separate Compressed Spectral Array (CSA) software package was conducted in real-time and run at bedside after set up by the technician, digitally displaying the power of electrographic frequencies included in the 1-30Hz band using a graded color map.  This data was reviewed and interpreted independently, and is reported in a separate section below.    History:  h/o ETOH abuse, HTN, MI, right basal ganglia intraparenchymal hemorrhage.  p/w left sided weakness     Medication	  No Data.	    Study Interpretation:    FINDINGS:  The record is technically difficult due to electrode artifacts and 60Hz noise arising from equipment problems.  During segments of the record that were interpretable, the background was continuous, spontaneously variable and reactive.  During wakefulness, the posteriorly dominant rhythm consisted of symmetric fragments of 10 Hz activity, with an amplitude to 30 uV, that attenuated to eye opening.  Low amplitude central beta was noted in wakefulness.    Background Slowing:  Generalized slowing: none was present.  Focal slowing: there is continuous polymorphic slowing in the left hemisphere.    Sleep Background:  Drowsiness and Stage II sleep transients were not recorded.    Epileptiform Activity:   No epileptiform discharges were present.    Events:  No clinical events were recorded.  No seizures were recorded.    Activation Procedures:   -Hyperventilation was not performed.    -Photic stimulation was not performed.    Artifacts:  Intermittent myogenic and movement artifacts were noted.    ECG:  The heart rate on single channel ECG was predominantly between xx BPM.    Compressed Spectral Array Digital Analysis    FINDINGS:  Compressed Spectral Array (CSA) data was reviewed separately and correlated with the electroencephalographic findings detailed above.  CSA showed a variable spectral pattern.  Areas of increased power in particular were reviewed in detail, and compared with the raw EEG data.  Areas of abrupt increases in spectral power were reviewed to exclude seizures, and were determined to be artifactual in nature.    The relative ratio of the power of delta range frequencies and faster frequencies remained stable over the course of the study.  There was no definitive increase in the relative power in the delta frequency spectrum apparent in the left hemisphere versus the right hemisphere.      Compressed Spectral Array (Digital Analysis) Summary/ Impression:  No persistent hemispheric asymmetry.  Intermittent areas of increased power reviewed, without definite epileptiform activity associated on CSA.      EEG Classification / Summary:  Technically difficult study due to EEG hardware noise.   1.	Continuous polymorphic delta in the right hemisphere     Clinical Impression:  The record is technically difficult study due to EEG hardware noise. There is evidence of structural abnormality in the right hemisphere, but it is not possible to exclude entirely the presence of focal epileptiform activity due to limitations of the study. A repeat study will be requested at no cost to patient.     Lauri Esteban MD PhD  Director, Epilepsy Division, [facility]  Report of Routine EEG with Video  And Report of DigitalCompressed Spectral Array Analysis    [facility]: [address] [facility], [address] [place] [address], Phone: [phone]  [facility]: [address], [place] [address], Phone: [phone]  Office: [address] [place] [address], [place] [address], [place] [address], Phone: [phone]    Patient Name: Michaelle Scott    Age: 82 y  : 1935  Patient ID: -, MRN #: MR# [id number], Location: [address] [place] [address]  Referring Physician: -    EEG #: [id number]  Study Date: 2017		    Technical Information:					  On Instrument: -  Placement and Labeling of Electrodes:  The EEG was performed utilizing 20 channels referential EEG connections (coronal over temporal over parasagittal montage) using all standard 10-20 electrode placements with EKG.  Recording was at a sampling rate of 256 samples per second per channel.  Time synchronized digital video recording was done simultaneously with EEG recording.  A low light infrared camera was used for low light recording.  García and seizure detection algorithms were utilized.  CSA Technical Component:  Quantitative EEG analysis using a separate Compressed Spectral Array (CSA) software package was conducted in real-time and run at bedside after set up by the technician, digitally displaying the power of electrographic frequencies included in the 1-30Hz band using a graded color map.  This data was reviewed and interpreted independently, and is reported in a separate section below.    History:  p/w left sided weakness  h/o ETOH abuse, HTN, GERD, stroke code    Medication	  No Data.	    Study Interpretation:    FINDINGS:  The background was continuous, spontaneously variable and reactive.  During wakefulness, the posteriorly dominant rhythm consisted of fragmentary symmetric, 10 Hz activity, with an amplitude to 20 uV, that attenuated to eye opening.  Low amplitude central beta was noted in wakefulness.    Background Slowing:  Generalized slowing: none was present.  Focal slowing: continuous polymorphic delta was present in the right hemisphere, greatest in the right temporal region.    Sleep Background:  Drowsiness and stage II sleep transients were not recorded.    Epileptiform Activity:   No epileptiform discharges were present.    Events:  No clinical events were recorded.  No seizures were recorded.    Activation Procedures:   -Hyperventilation was not performed.    -Photic stimulation was not performed.    Artifacts:  Intermittent myogenic and movement artifacts were noted.    ECG:  The heart rate on single channel ECG was predominantly between 50-60 BPM.    Compressed Spectral Array Digital Analysis    FINDINGS:  Compressed Spectral Array (CSA) data was reviewed separately and correlated with the electroencephalographic findings detailed above.  CSA showed a variable spectral pattern.  Areas of increased power in particular were reviewed in detail, and compared with the raw EEG data.  Areas of abrupt increases in spectral power were reviewed to exclude seizures, and were determined to be artifactual in nature.    The relative ratio of the power of delta range frequencies and faster frequencies remained stable over the course of the study.  There was no definitive increase in the relative power in the delta frequency spectrum apparent in the left hemisphere versus the right hemisphere.      Compressed Spectral Array (Digital Analysis) Summary/ Impression:  No persistent hemispheric asymmetry.  Intermittent areas of increased power reviewed, without definite epileptiform activity associated on CSA.      EEG Classification / Summary:  Abnormal Routine EEG Study   1.	Continuous polymorphic slowing, focal, right hemisphere      Clinical Impression:  There were no epileptiform abnormalities recorded.  There is evidence of a structural abnormality in the right hemisphere, with the right temporal lobe most affected.    Lauri Esteban MD PhD  Director, Epilepsy Division, [facility]  Report of Routine EEG with Video  And Report of DigitalCompressed Spectral Array Analysis    [facility]: [address] [facility], [address] [place] [address], Phone: [phone]  [facility]: [address], [place] [address], Phone: [phone]  Office: [address] [place] [address], [place] [address], [place] [address], Phone: [phone]    Patient Name: Michaelle Scott    Age: 82 y  : 1935  Patient ID: -, MRN #: MR# [id number], Location: [address] [place] [address]  Referring Physician: -    Study Date: 2017- 2017		    History:  p/w left sided weakness  h/o ETOH abuse, HTN, GERD, stroke code    Medication	  No Data.	    Study Interpretation:    FINDINGS:  The background was continuous, spontaneously variable and reactive.  During wakefulness, the posteriorly dominant rhythm consisted of fragmentary  9 Hz activity, with an amplitude to 20 uV, that attenuated to eye opening and was better appreciated over the left hemisphere.  Low amplitude central beta was noted in wakefulness.    Background Slowing:  Generalized slowing: none was present.  Focal slowing: intermittent polymorphic delta was present in the right hemisphere, greatest in the right temporal region.    Sleep Background:  Drowsiness was recorded    Epileptiform Activity:   Pseudoperiodic (0.5-1Hz) runs of sharply contoured waves over the right temporal region (PLEDs)     No epileptiform discharges were present.    Events:  No clinical events were recorded.  No seizures were recorded.    Activation Procedures:   -Hyperventilation was not performed.    -Photic stimulation was not performed.    Artifacts:  Intermittent myogenic and movement artifacts were noted.    ECG:  The heart rate on single channel ECG was predominantly between 50-60 BPM.    Compressed Spectral Array Digital Analysis    FINDINGS:  Compressed Spectral Array (CSA) data was reviewed separately and correlated with the electroencephalographic findings detailed above.  CSA showed a variable spectral pattern.  Areas of increased power in particular were reviewed in detail, and compared with the raw EEG data.  Areas of abrupt increases in spectral power were reviewed to exclude seizures, and were determined to be artifactual in nature.    The relative ratio of the power of delta range frequencies and faster frequencies remained stable over the course of the study.  There was no definitive increase in the relative power in the delta frequency spectrum apparent in the left hemisphere versus the right hemisphere.      Compressed Spectral Array (Digital Analysis) Summary/ Impression:  No persistent hemispheric asymmetry.  Intermittent areas of increased power reviewed, without definite epileptiform activity associated on CSA.      EEG Classification / Summary:  Abnormal Routine EEG Study   1.	Intermittent polymorphic slowing, focal, right hemisphere  2.	Right PLEDs      Clinical Impression:  There is evidence of a structural abnormality in the right hemisphere, potentially epileptogenic in nature, with the right temporal lobe most affected.  No seizures on this recording.

## 2017-07-05 NOTE — PROGRESS NOTE ADULT - ASSESSMENT
82 year old Female with HTN/MI/ ETOH abuse admitted with acute hemorrhagic CVA  / UTI/   now dysphagia     1. cv stable no chest pain or sob.  no events on tele   no decompensated CHF on exam   f.u TTE  patient optimized from a cv standpoint to proceed for peg placement with Low cardiac risk    2. Acute hemorrhagic CVA   presumed hypertensive hemorrhage  Neuro f/u      3. HTN; chronic    goal for BP < 160/90 per neuro  current BP acceptable   resume home BP meds when able to tolerate PO     4. Dysphagia  s/p egd +esophageal stricture  + NG tube   plan for IR for  Peg placement     5.UTI   med f/u   continue with IV abx     6. DVT PPX

## 2017-07-06 PROCEDURE — 99232 SBSQ HOSP IP/OBS MODERATE 35: CPT

## 2017-07-06 PROCEDURE — 99233 SBSQ HOSP IP/OBS HIGH 50: CPT

## 2017-07-06 RX ORDER — SODIUM CHLORIDE 9 MG/ML
2 INJECTION INTRAMUSCULAR; INTRAVENOUS; SUBCUTANEOUS
Qty: 0 | Refills: 0 | Status: DISCONTINUED | OUTPATIENT
Start: 2017-07-06 | End: 2017-07-07

## 2017-07-06 RX ORDER — SODIUM CHLORIDE 9 MG/ML
1 INJECTION INTRAMUSCULAR; INTRAVENOUS; SUBCUTANEOUS DAILY
Qty: 0 | Refills: 0 | Status: DISCONTINUED | OUTPATIENT
Start: 2017-07-06 | End: 2017-07-07

## 2017-07-06 RX ORDER — CODEINE SULFATE 60 MG/1
15 TABLET ORAL ONCE
Qty: 0 | Refills: 0 | Status: DISCONTINUED | OUTPATIENT
Start: 2017-07-06 | End: 2017-07-06

## 2017-07-06 RX ORDER — QUETIAPINE FUMARATE 200 MG/1
25 TABLET, FILM COATED ORAL AT BEDTIME
Qty: 0 | Refills: 0 | Status: DISCONTINUED | OUTPATIENT
Start: 2017-07-06 | End: 2017-07-12

## 2017-07-06 RX ORDER — DIPHENHYDRAMINE HCL 50 MG
25 CAPSULE ORAL ONCE
Qty: 0 | Refills: 0 | Status: COMPLETED | OUTPATIENT
Start: 2017-07-06 | End: 2017-07-06

## 2017-07-06 RX ADMIN — NYSTATIN CREAM 1 APPLICATION(S): 100000 CREAM TOPICAL at 14:30

## 2017-07-06 RX ADMIN — Medication 10 MILLIGRAM(S): at 23:30

## 2017-07-06 RX ADMIN — LISINOPRIL 40 MILLIGRAM(S): 2.5 TABLET ORAL at 05:08

## 2017-07-06 RX ADMIN — ENOXAPARIN SODIUM 40 MILLIGRAM(S): 100 INJECTION SUBCUTANEOUS at 14:30

## 2017-07-06 RX ADMIN — NYSTATIN CREAM 1 APPLICATION(S): 100000 CREAM TOPICAL at 22:27

## 2017-07-06 RX ADMIN — Medication 25 MILLIGRAM(S): at 05:08

## 2017-07-06 RX ADMIN — QUETIAPINE FUMARATE 25 MILLIGRAM(S): 200 TABLET, FILM COATED ORAL at 22:26

## 2017-07-06 RX ADMIN — LEVETIRACETAM 400 MILLIGRAM(S): 250 TABLET, FILM COATED ORAL at 17:32

## 2017-07-06 RX ADMIN — Medication 650 MILLIGRAM(S): at 23:00

## 2017-07-06 RX ADMIN — Medication 25 MILLIGRAM(S): at 17:32

## 2017-07-06 RX ADMIN — CODEINE SULFATE 15 MILLIGRAM(S): 60 TABLET ORAL at 02:18

## 2017-07-06 RX ADMIN — Medication 650 MILLIGRAM(S): at 00:44

## 2017-07-06 RX ADMIN — AMLODIPINE BESYLATE 5 MILLIGRAM(S): 2.5 TABLET ORAL at 05:08

## 2017-07-06 RX ADMIN — Medication 25 MILLIGRAM(S): at 04:40

## 2017-07-06 RX ADMIN — SODIUM CHLORIDE 2 GRAM(S): 9 INJECTION INTRAMUSCULAR; INTRAVENOUS; SUBCUTANEOUS at 22:26

## 2017-07-06 RX ADMIN — LEVETIRACETAM 400 MILLIGRAM(S): 250 TABLET, FILM COATED ORAL at 05:09

## 2017-07-06 RX ADMIN — Medication 25 MICROGRAM(S): at 05:09

## 2017-07-06 RX ADMIN — NYSTATIN CREAM 1 APPLICATION(S): 100000 CREAM TOPICAL at 05:09

## 2017-07-06 RX ADMIN — CODEINE SULFATE 15 MILLIGRAM(S): 60 TABLET ORAL at 02:45

## 2017-07-06 RX ADMIN — Medication 650 MILLIGRAM(S): at 22:24

## 2017-07-06 NOTE — PROGRESS NOTE ADULT - ASSESSMENT
82 year old Female with HTN/MI/ ETOH abuse admitted with acute hemorrhagic CVA  / UTI/   now dysphagia     1. cv stable no chest pain or sob.  no events on tele   no decompensated CHF on exam   echo with normal LV sys fx , no significant changes       2. Acute hemorrhagic CVA   presumed hypertensive hemorrhage  abnl EEG findings - Neuro f/u      3. HTN; chronic    goal for BP < 160/90 per neuro  current BP acceptable   continue with amlodipine/ metoprolol  BP control per neuro      4. Dysphagia  s/p egd +esophageal stricture  s/p Peg placement   gi f.u    5.UTI   med f/u   IV abx completed     6. DVT PPX

## 2017-07-06 NOTE — PROGRESS NOTE ADULT - SUBJECTIVE AND OBJECTIVE BOX
Tolerating PT/OT- mod A w transfers.  No CP, No SOB  MEDICATIONS  (STANDING):  nystatin Cream 1 Application(s) Topical every 8 hours  lisinopril 40 milliGRAM(s) Oral daily  levothyroxine Injectable 25 MICROGram(s) IV Push daily  enoxaparin Injectable 40 milliGRAM(s) SubCutaneous daily  metoprolol 25 milliGRAM(s) Oral two times a day  amLODIPine   Tablet 5 milliGRAM(s) Oral daily  levETIRAcetam  IVPB 500 milliGRAM(s) IV Intermittent every 12 hours  QUEtiapine 25 milliGRAM(s) Oral at bedtime  sodium chloride 2 Gram(s) Oral <User Schedule>  sodium chloride 1 Gram(s) Oral daily    MEDICATIONS  (PRN):  hydrALAZINE Injectable 10 milliGRAM(s) IV Push every 6 hours PRN Systolic/Diastolic >160/90  ondansetron    Tablet 4 milliGRAM(s) Oral every 6 hours PRN Nausea and/or Vomiting  LORazepam     Tablet 1 milliGRAM(s) Oral every 8 hours PRN Agitation  acetaminophen    Suspension. 650 milliGRAM(s) Oral every 6 hours PRN Moderate Pain (4 - 6)    VSS, Afebrile  ----------------------------------------------------------------------------------------  PHYSICAL EXAM  Neurologic Exam -                    Cognitive - Awake, Alert,     Communication - +dysarthria,      Cranial Nerves - left facial droop and numbness in V3 area     Motor - 5/5 in RUE and RLE. Left: 1/5 EF, EE, WF, , HF, KF, PF, EHL. 0/5 ShAbd, WE, KE, DF. Spasticity in LUE     Sensory - diminished to LT over LUE and LLE     Reflexes - DTR Intact,       Impression:  83 yo F with right IPH with left hemiparesis, dysarthria    Plan:  PT- ROM, Bed Mob, Transfers, Amb w AD and bracing as needed  OT- ADLs, bracing  SLP- Speech/Dysphagia eval and treat  Prec- Falls  SCDs for DVT Prophylaxis until AC is started as per neuro  Skin- Turn q2 h. Recommend L resting hand splint and PRAFO  Dispo- Recommend acute rehab on dc as pt could tolerate 3 hrs/day PT/OT/SLP

## 2017-07-06 NOTE — PROGRESS NOTE ADULT - SUBJECTIVE AND OBJECTIVE BOX
CARDIOLOGY FOLLOW UP     CC no chest pain or sob       PHYSICAL EXAM:  T(C): 36.6 (07-06-17 @ 12:00), Max: 36.8 (07-05-17 @ 20:45)  HR: 84 (07-06-17 @ 12:00) (79 - 92)  BP: 133/75 (07-06-17 @ 12:00) (133/75 - 174/71)  RR: 18 (07-06-17 @ 12:00) (17 - 18)  SpO2: 99% (07-06-17 @ 12:00) (97% - 100%)  Wt(kg): --  I&O's Summary    05 Jul 2017 07:01  -  06 Jul 2017 07:00  --------------------------------------------------------  IN: 100 mL / OUT: 0 mL / NET: 100 mL    06 Jul 2017 07:01  -  06 Jul 2017 15:06  --------------------------------------------------------  IN: 0 mL / OUT: 0 mL / NET: 0 mL        Appearance: Normal	  Cardiovascular: Normal S1 S2,RRR, No JVD, No murmurs  Respiratory: Lungs clear to auscultation	  Gastrointestinal:  Soft, Non-tender, + BS	 + PEG   Extremities: Normal range of motion, No clubbing, cyanosis or edema        MEDICATIONS  (STANDING):  nystatin Cream 1 Application(s) Topical every 8 hours  lisinopril 40 milliGRAM(s) Oral daily  levothyroxine Injectable 25 MICROGram(s) IV Push daily  enoxaparin Injectable 40 milliGRAM(s) SubCutaneous daily  metoprolol 25 milliGRAM(s) Oral two times a day  amLODIPine   Tablet 5 milliGRAM(s) Oral daily  levETIRAcetam  IVPB 500 milliGRAM(s) IV Intermittent every 12 hours      TELEMETRY: 	NSR       OTHER:   < from: Transthoracic Echocardiogram (07.05.17 @ 10:24) >  EF (Visual Estimate): 65 %  < from: Transthoracic Echocardiogram (07.05.17 @ 10:24) >  ------------------------------------------------------------------------  Conclusions:  1. Normal left ventricular internal dimensions and wall  thicknesses.  2. Normal left ventricular systolic function. No segmental  wall motion abnormalities.  3. Normal diastolic function  4. Normal right ventricular size and function.  *** Compared with echocardiogram of 2/24/2015, no  significant changes noted.  ------------------------------------------------------------------------          	    LABS:

## 2017-07-07 PROCEDURE — 99233 SBSQ HOSP IP/OBS HIGH 50: CPT

## 2017-07-07 PROCEDURE — 99232 SBSQ HOSP IP/OBS MODERATE 35: CPT

## 2017-07-07 RX ORDER — AMLODIPINE BESYLATE 2.5 MG/1
10 TABLET ORAL DAILY
Qty: 0 | Refills: 0 | Status: DISCONTINUED | OUTPATIENT
Start: 2017-07-07 | End: 2017-07-12

## 2017-07-07 RX ORDER — QUETIAPINE FUMARATE 200 MG/1
1 TABLET, FILM COATED ORAL
Qty: 0 | Refills: 0 | COMMUNITY
Start: 2017-07-07

## 2017-07-07 RX ORDER — AMLODIPINE BESYLATE 2.5 MG/1
1 TABLET ORAL
Qty: 0 | Refills: 0 | COMMUNITY
Start: 2017-07-07

## 2017-07-07 RX ORDER — SODIUM CHLORIDE 9 MG/ML
1 INJECTION INTRAMUSCULAR; INTRAVENOUS; SUBCUTANEOUS DAILY
Qty: 0 | Refills: 0 | Status: DISCONTINUED | OUTPATIENT
Start: 2017-07-07 | End: 2017-07-07

## 2017-07-07 RX ADMIN — AMLODIPINE BESYLATE 5 MILLIGRAM(S): 2.5 TABLET ORAL at 05:04

## 2017-07-07 RX ADMIN — NYSTATIN CREAM 1 APPLICATION(S): 100000 CREAM TOPICAL at 14:26

## 2017-07-07 RX ADMIN — LEVETIRACETAM 400 MILLIGRAM(S): 250 TABLET, FILM COATED ORAL at 17:42

## 2017-07-07 RX ADMIN — LISINOPRIL 40 MILLIGRAM(S): 2.5 TABLET ORAL at 05:04

## 2017-07-07 RX ADMIN — SODIUM CHLORIDE 2 GRAM(S): 9 INJECTION INTRAMUSCULAR; INTRAVENOUS; SUBCUTANEOUS at 05:04

## 2017-07-07 RX ADMIN — Medication 25 MILLIGRAM(S): at 17:42

## 2017-07-07 RX ADMIN — SODIUM CHLORIDE 1 GRAM(S): 9 INJECTION INTRAMUSCULAR; INTRAVENOUS; SUBCUTANEOUS at 12:11

## 2017-07-07 RX ADMIN — QUETIAPINE FUMARATE 25 MILLIGRAM(S): 200 TABLET, FILM COATED ORAL at 22:07

## 2017-07-07 RX ADMIN — Medication 650 MILLIGRAM(S): at 17:41

## 2017-07-07 RX ADMIN — ENOXAPARIN SODIUM 40 MILLIGRAM(S): 100 INJECTION SUBCUTANEOUS at 12:09

## 2017-07-07 RX ADMIN — Medication 650 MILLIGRAM(S): at 12:09

## 2017-07-07 RX ADMIN — Medication 25 MILLIGRAM(S): at 05:04

## 2017-07-07 RX ADMIN — NYSTATIN CREAM 1 APPLICATION(S): 100000 CREAM TOPICAL at 05:05

## 2017-07-07 RX ADMIN — LEVETIRACETAM 400 MILLIGRAM(S): 250 TABLET, FILM COATED ORAL at 05:05

## 2017-07-07 RX ADMIN — Medication 650 MILLIGRAM(S): at 12:39

## 2017-07-07 RX ADMIN — NYSTATIN CREAM 1 APPLICATION(S): 100000 CREAM TOPICAL at 22:07

## 2017-07-07 RX ADMIN — Medication 25 MICROGRAM(S): at 05:04

## 2017-07-07 NOTE — PROGRESS NOTE ADULT - ASSESSMENT
ASSESSMENT: 82y woman with HTN presents with R BG presumed hypertensive hemorrhage. Impression: R hypertensive ICH    NEURO: Neuro exam unchanged, Episode of facial twitching on 06/29,  EEG with intermittent polymorphic slowing right hemisphere and right PLEDS, continue keppra 500mg BID  given findings as noted above, plan to repeat EEG in 3 months to eval for PLEDS; at that time, if she has had no further clinical seizures, and if PLEDS have resolved, may consider stopping Keppra. Continue close monitoring for neurologic deterioration in setting of stable cerebral edema with mass effect and brain compression, titrate to normotension, hx of ETOH abuse, psych consult appreciated, Physical therapy/OT recommend AR, continue current rehab treatment  ANTITHROMBOTIC THERAPY: : none in setting of IPH    PULMONARY: CXR clear, protecting airway, saturating well     CARDIOVASCULAR:   cardiac monitoring without events, continue home cardiac regimen and titrate accordingly                        GASTROINTESTINAL::   s/p PEG placement (07/05) tolerating  feedings, IR team consult appreciated     Diet: PEG    RENAL: :   BUN/Cr without acute change, good urine output     Na Goal: Greater than 135     Basurto: N    HEMATOLOGY: H/H, platelets normal     DVT ppx: LMWH     ID:  afebrile, no leukocytosis, ceftriaxone course complete    OTHER:     DISPOSITION: AR as per PMR eval once stable and workup is complete    CORE MEASURES:        Admission NIHSS: 11     TPA:  NO      LDL/HDL: 86/71     Depression Screen:  0     Statin Therapy: None due to IPH     Dysphagia Screen:  FAIL     SmokingNO      Afib  NO     [employer]  YES

## 2017-07-07 NOTE — PROGRESS NOTE ADULT - ASSESSMENT
82 year old Female with HTN/MI/ ETOH abuse admitted with acute hemorrhagic CVA  / UTI/   now dysphagia     1. cv stable no chest pain or sob.  no events on tele   no decompensated CHF on exam   echo with normal LV sys fx , no significant changes       2. Acute hemorrhagic CVA   presumed hypertensive hemorrhage  neuro f/u     3. HTN; chronic    goal for BP < 160/90 per neuro  current BP acceptable   continue with amlodipine/ metoprolol  BP control per neuro      4. Dysphagia  s/p Peg placement   gi f.u      5. DVT PPX

## 2017-07-07 NOTE — PROGRESS NOTE ADULT - SUBJECTIVE AND OBJECTIVE BOX
CARDIOLOGY FOLLOW UP     CC no chest pain or sob       PHYSICAL EXAM:  T(C): 36.4 (07-07-17 @ 08:09), Max: 36.5 (07-07-17 @ 05:00)  HR: 62 (07-07-17 @ 08:09) (62 - 88)  BP: 129/96 (07-07-17 @ 08:09) (129/96 - 163/87)  RR: 18 (07-07-17 @ 08:09) (18 - 18)  SpO2: 99% (07-07-17 @ 08:09) (97% - 99%)  Wt(kg): --  I&O's Summary    06 Jul 2017 07:01  -  07 Jul 2017 07:00  --------------------------------------------------------  IN: 480 mL / OUT: 0 mL / NET: 480 mL        Appearance: Normal	  Cardiovascular: Normal S1 S2,RRR, No JVD, No murmurs  Respiratory: Lungs clear to auscultation	  Gastrointestinal:  Soft, Non-tender, + BS	+ peg   Extremities: Normal range of motion, No clubbing, cyanosis or edema        MEDICATIONS  (STANDING):  nystatin Cream 1 Application(s) Topical every 8 hours  lisinopril 40 milliGRAM(s) Oral daily  levothyroxine Injectable 25 MICROGram(s) IV Push daily  enoxaparin Injectable 40 milliGRAM(s) SubCutaneous daily  metoprolol 25 milliGRAM(s) Oral two times a day  levETIRAcetam  IVPB 500 milliGRAM(s) IV Intermittent every 12 hours  QUEtiapine 25 milliGRAM(s) Oral at bedtime  sodium chloride 2 Gram(s) Oral <User Schedule>  amLODIPine   Tablet 10 milliGRAM(s) Oral daily  sodium chloride 1 Gram(s) Oral daily      TELEMETRY: 	NSR       OTHER: 	    LABS:

## 2017-07-07 NOTE — PROVIDER CONTACT NOTE (OTHER) - REASON
C/o headache, less movement on left arm than before (no effort against gravity.)Pt is nauseated
Patient's Blood pressure is elevated, 162/74
Patients BP elevated, 163/87
pt with BP elevated 190s/pt with elevated BP 190s pt s/p seizure like episodes No LOC alert oriented
tele reported PATs for 3 sec
unable to treat elevated blood pressure and send pt to MRI, no IV access after 2 attempts
Patient's BP is elevated, 163/81

## 2017-07-07 NOTE — PROVIDER CONTACT NOTE (OTHER) - SITUATION
Patients BP elevated, 163/87
unable to treat elevated blood pressure and send pt to MRI, no IV access after 2 attempts
Patient's BP is elevated, 163/81
C/o headache, less movement on left arm than before (no effort against gravity.)Pt is nauseated
Patient's Blood pressure is elevated, 162/74
tele reported PATs for 3 sec
pt with elevated BP 190s pt s/p seizure like episodes No LOC alert oriented

## 2017-07-07 NOTE — PROVIDER CONTACT NOTE (OTHER) - ACTION/TREATMENT ORDERED:
RN attempt 2 IV access that was unsuccessful, RN called and spoke to IV nurse at 2038 to place access for medication as pt is NPO, MD notified about delay in treatment.
Administer hydralazine.
hydralazine 10mg IV
As per NP no new orders at this time
As per NP, Administer hydralazine.
As per NP, administer hydralazine.
Keterolac 15 mg IVPx1 given.

## 2017-07-07 NOTE — PROVIDER CONTACT NOTE (OTHER) - ASSESSMENT
pt alert and oriented, unable to treat elevated blood pressure and send pt to MRI, no IV access after 2 attempts.
C/o headache, less movement on left arm than before (no effort against gravity.)Pt is nauseated
Patient is neurologically stable. Patient denies any symptoms associated with increased blood pressure.
Patient denies any symptoms associated with increased blood pressure. All other vitals signs are stable. Patient does not report pain/ discomfort.
Patient is neurologically at baseline. Patient does not present with any signs or symptoms associated with increased blood pressure.  Patient denies pain/discomfort.
pt stable HR 60s /59 pt sleeping arousable
pt with elevated BP 190s pt s/p seizure like episodes No LOC alert oriented

## 2017-07-07 NOTE — PROVIDER CONTACT NOTE (OTHER) - BACKGROUND
Patient diagnosed with R. Intraparenchymal Hemorrhage S/P Fall off bed
Patient diagnosed with right intraparenchymal hemorrhage.
Patient diagnosed with nontraumatic intracerebral hemorrhage.
s/p IPH
CVA
s/p IPH
Pt admitted with s/p fall off bed, Right IPH

## 2017-07-07 NOTE — PROVIDER CONTACT NOTE (OTHER) - RECOMMENDATIONS
eval/tx
Administer hydralazine and continue to monitor patient.
Administer hydralazine. Will continue to monitor patient.
Administer hydralazine. Will continue to monitor.
Need NP to assess
eval
IV nurse to place access

## 2017-07-07 NOTE — PROGRESS NOTE ADULT - SUBJECTIVE AND OBJECTIVE BOX
THE PATIENT WAS SEEN AND EXAMINED BY ME WITH THE HOUSESTAFF AND STROKE TEAM DURING MORNING ROUNDS.   HPI:  82y old RH woman with PMH HTN, GERD, ETOH abuse and on ASA who presented with a chief complaint of left sided weakness. No tPA given due to IPH on CTH.  At baseline walks with cane and has aide at home to assist with ADLs.  Admitted to stroke service for further workup and evaluation      MEDICATIONS    allopurinol, ASA, Citalopram, metoprolol, famotidine, synthroid, atorvastatin      SOCIAL HISTORY:  No history of tobacco or alcohol use     Allergies    No Known Allergies    Intolerances (26 Jun 2017 14:34)      SUBJECTIVE: No events overnight.  No new neurologic complaints.      hydrALAZINE Injectable 10 milliGRAM(s) IV Push every 6 hours PRN  nystatin Cream 1 Application(s) Topical every 8 hours  ondansetron    Tablet 4 milliGRAM(s) Oral every 6 hours PRN  lisinopril 40 milliGRAM(s) Oral daily  levothyroxine Injectable 25 MICROGram(s) IV Push daily  enoxaparin Injectable 40 milliGRAM(s) SubCutaneous daily  LORazepam     Tablet 1 milliGRAM(s) Oral every 8 hours PRN  acetaminophen    Suspension. 650 milliGRAM(s) Oral every 6 hours PRN  metoprolol 25 milliGRAM(s) Oral two times a day  amLODIPine   Tablet 5 milliGRAM(s) Oral daily  levETIRAcetam  IVPB 500 milliGRAM(s) IV Intermittent every 12 hours  QUEtiapine 25 milliGRAM(s) Oral at bedtime  sodium chloride 2 Gram(s) Oral <User Schedule>  sodium chloride 1 Gram(s) Oral daily      PHYSICAL EXAM:   Vital Signs Last 24 Hrs  T(C): 36.4 (07 Jul 2017 08:09), Max: 36.6 (06 Jul 2017 12:00)  T(F): 97.5 (07 Jul 2017 08:09), Max: 97.9 (06 Jul 2017 12:00)  HR: 62 (07 Jul 2017 08:09) (62 - 88)  BP: 129/96 (07 Jul 2017 08:09) (129/96 - 163/87)  BP(mean): --  RR: 18 (07 Jul 2017 08:09) (18 - 18)  SpO2: 99% (07 Jul 2017 08:09) (97% - 99%)    General: No acute distress  HEENT: EOM intact, visual fields full  Abdomen: Soft, nontender, nondistended   Extremities: No edema    NEUROLOGICAL EXAM:  Mental status: eyes open, alert, oriented name, age and hospital, fluent speech, no neglect, perseverates at times, able to follow commands  Cranial Nerves: Subtle left facial palsy, no nystagmus, moderate dysarthria, dysphagia  Motor exam: RUE 5/5,  RLE 5/5, Left hemiparesis with some effort against gravity LUE proximally 3-/5 and LLE anterior tib 3/5,, proximally with partial left hip and knee flexion  Sensation: Diminished on left side  Coordination/ Gait: No dysmetria, gait not assessed    LABS:         Hemoglobin A1C, Whole Blood: 5.5 % (07-03 @ 12:08)      IMAGING: Reviewed by me.   CT Head No Cont/ CERVICAL SPINE CT Head No Cont (06.26.1) Noncontrast CT brain: Acute right intraparenchymal hemorrhage  Acute intraparenchymal hemorrhage just lateral to the right thalamic body which measures 2.7 x 1.5 x 1.9 cm (AP x TV x CC). There is no midline shift or central herniation. CT cervicalspine:  No evidence for acute displaced fracture or traumatic malalignment. Cervical degenerative spondylosis  CT Head No Cont (06.27.17) >Grossly stable 2.9 x 1.9 cm parenchymal hemorrhage in the region of the posterior right lentiform nucleus.  No midline shift or hydrocephalus. MRA Head w/o Cont (06.28.17) Right thalamocapsular region hemorrhage as seen on the prior CT. No abnormal enhancement to suggest an underlying lesion.  Suspicion of a moderate stenosis approximately 2 cm distal to the origin of the right internal carotid artery. Atherosclerotic irregularity at the level of the left internal carotid artery is suggested. CT Abdomen and Pelvis No Cont (07.02) >Cholelithiasis.Diverticulosis. Age-indeterminate compression deformity of L1. Chronic compression deformity of T11.  A 1.8 cm low-attenuation right anterior lower pelvic subcutaneous nodule may represent a sebaceous cyst THE PATIENT WAS SEEN AND EXAMINED BY ME WITH THE HOUSESTAFF AND STROKE TEAM DURING MORNING ROUNDS.   HPI:  82y old RH woman with PMH HTN, GERD, ETOH abuse and on ASA who presented with a chief complaint of left sided weakness. No tPA given due to IPH on CTH.  At baseline walks with cane and has aide at home to assist with ADLs.  Admitted to stroke service for further workup and evaluation      MEDICATIONS    allopurinol, ASA, Citalopram, metoprolol, famotidine, synthroid, atorvastatin      SOCIAL HISTORY:  No history of tobacco or alcohol use     Allergies    No Known Allergies    Intolerances (26 Jun 2017 14:34)      SUBJECTIVE: No events overnight.  No new neurologic complaints.      hydrALAZINE Injectable 10 milliGRAM(s) IV Push every 6 hours PRN  nystatin Cream 1 Application(s) Topical every 8 hours  ondansetron    Tablet 4 milliGRAM(s) Oral every 6 hours PRN  lisinopril 40 milliGRAM(s) Oral daily  levothyroxine Injectable 25 MICROGram(s) IV Push daily  enoxaparin Injectable 40 milliGRAM(s) SubCutaneous daily  LORazepam     Tablet 1 milliGRAM(s) Oral every 8 hours PRN  acetaminophen    Suspension. 650 milliGRAM(s) Oral every 6 hours PRN  metoprolol 25 milliGRAM(s) Oral two times a day  amLODIPine   Tablet 5 milliGRAM(s) Oral daily  levETIRAcetam  IVPB 500 milliGRAM(s) IV Intermittent every 12 hours  QUEtiapine 25 milliGRAM(s) Oral at bedtime  sodium chloride 2 Gram(s) Oral <User Schedule>  sodium chloride 1 Gram(s) Oral daily      PHYSICAL EXAM:   Vital Signs Last 24 Hrs  T(C): 36.4 (07 Jul 2017 08:09), Max: 36.6 (06 Jul 2017 12:00)  T(F): 97.5 (07 Jul 2017 08:09), Max: 97.9 (06 Jul 2017 12:00)  HR: 62 (07 Jul 2017 08:09) (62 - 88)  BP: 129/96 (07 Jul 2017 08:09) (129/96 - 163/87)  BP(mean): --  RR: 18 (07 Jul 2017 08:09) (18 - 18)  SpO2: 99% (07 Jul 2017 08:09) (97% - 99%)    General: No acute distress  HEENT: EOM intact, visual fields full  Abdomen: Soft, nontender, nondistended   Extremities: No edema    NEUROLOGICAL EXAM:  Mental status: eyes open, alert, oriented name, age and hospital, fluent speech, no neglect, perseverates at times, able to follow commands  Cranial Nerves: Subtle left facial palsy, no nystagmus, moderate dysarthria,   Motor exam: RUE 5/5,  RLE 5/5, Left hemiparesis with some effort against gravity LUE proximally 3-/5 with repetitive prompting,  and LLE dorsiflexion with minimal dorsiflexion, proximally with partial left hip and knee flexion, ? motor neglect  Sensation: Diminished on left side  Coordination/ Gait: No dysmetria, gait not assessed    LABS:         Hemoglobin A1C, Whole Blood: 5.5 % (07-03 @ 12:08)      IMAGING: Reviewed by me.   CT Head No Cont/ CERVICAL SPINE CT Head No Cont (06.26.1) Noncontrast CT brain: Acute right intraparenchymal hemorrhage  Acute intraparenchymal hemorrhage just lateral to the right thalamic body which measures 2.7 x 1.5 x 1.9 cm (AP x TV x CC). There is no midline shift or central herniation. CT cervicalspine:  No evidence for acute displaced fracture or traumatic malalignment. Cervical degenerative spondylosis  CT Head No Cont (06.27.17) >Grossly stable 2.9 x 1.9 cm parenchymal hemorrhage in the region of the posterior right lentiform nucleus.  No midline shift or hydrocephalus. MRA Head w/o Cont (06.28.17) Right thalamocapsular region hemorrhage as seen on the prior CT. No abnormal enhancement to suggest an underlying lesion.  Suspicion of a moderate stenosis approximately 2 cm distal to the origin of the right internal carotid artery. Atherosclerotic irregularity at the level of the left internal carotid artery is suggested. CT Abdomen and Pelvis No Cont (07.02) >Cholelithiasis.Diverticulosis. Age-indeterminate compression deformity of L1. Chronic compression deformity of T11.  A 1.8 cm low-attenuation right anterior lower pelvic subcutaneous nodule may represent a sebaceous cyst

## 2017-07-07 NOTE — PROVIDER CONTACT NOTE (OTHER) - DATE AND TIME:
01-Jul-2017 01:39
04-Jul-2017 06:01
05-Jul-2017 01:00
30-Jun-2017 22:04
05-Jul-2017 05:20
27-Jun-2017 20:59
07-Jul-2017 06:00

## 2017-07-08 PROCEDURE — 99233 SBSQ HOSP IP/OBS HIGH 50: CPT

## 2017-07-08 RX ADMIN — QUETIAPINE FUMARATE 25 MILLIGRAM(S): 200 TABLET, FILM COATED ORAL at 21:28

## 2017-07-08 RX ADMIN — NYSTATIN CREAM 1 APPLICATION(S): 100000 CREAM TOPICAL at 12:09

## 2017-07-08 RX ADMIN — Medication 650 MILLIGRAM(S): at 06:24

## 2017-07-08 RX ADMIN — LISINOPRIL 40 MILLIGRAM(S): 2.5 TABLET ORAL at 06:45

## 2017-07-08 RX ADMIN — NYSTATIN CREAM 1 APPLICATION(S): 100000 CREAM TOPICAL at 21:28

## 2017-07-08 RX ADMIN — Medication 650 MILLIGRAM(S): at 23:15

## 2017-07-08 RX ADMIN — Medication 25 MILLIGRAM(S): at 17:30

## 2017-07-08 RX ADMIN — ENOXAPARIN SODIUM 40 MILLIGRAM(S): 100 INJECTION SUBCUTANEOUS at 12:07

## 2017-07-08 RX ADMIN — LEVETIRACETAM 400 MILLIGRAM(S): 250 TABLET, FILM COATED ORAL at 05:38

## 2017-07-08 RX ADMIN — Medication 650 MILLIGRAM(S): at 05:35

## 2017-07-08 RX ADMIN — NYSTATIN CREAM 1 APPLICATION(S): 100000 CREAM TOPICAL at 05:37

## 2017-07-08 RX ADMIN — AMLODIPINE BESYLATE 10 MILLIGRAM(S): 2.5 TABLET ORAL at 05:35

## 2017-07-08 RX ADMIN — Medication 25 MICROGRAM(S): at 05:37

## 2017-07-08 RX ADMIN — Medication 650 MILLIGRAM(S): at 22:50

## 2017-07-08 RX ADMIN — LEVETIRACETAM 400 MILLIGRAM(S): 250 TABLET, FILM COATED ORAL at 17:30

## 2017-07-08 RX ADMIN — Medication 25 MILLIGRAM(S): at 05:35

## 2017-07-08 NOTE — PROGRESS NOTE ADULT - ASSESSMENT
ASSESSMENT: 82y woman with HTN presents with R BG presumed hypertensive hemorrhage. Impression: R hypertensive ICH    NEURO: Neuro exam unchanged, Episode of facial twitching on 06/29,  EEG with intermittent polymorphic slowing right hemisphere and right PLEDS, continue keppra 500mg BID, plan to repeat EEG in 3 months to eval for PLEDS; at that time, if she has had no further clinical seizures, and if PLEDS have resolved, may consider stopping Keppra. Continue close monitoring for neurologic deterioration in setting of stable cerebral edema with mass effect and brain compression, titrate to normotension, hx of ETOH abuse, psych consult appreciated, Physical therapy/OT recommend AR, continue current rehab treatment  ANTITHROMBOTIC THERAPY: : none in setting of IPH    PULMONARY: CXR clear, protecting airway, saturating well     CARDIOVASCULAR:   cardiac monitoring without events, continue home cardiac regimen and titrate accordingly                        GASTROINTESTINAL::   s/p PEG placement (07/05) tolerating  feedings, IR team consult appreciated     Diet: PEG    RENAL:  good urine output     Na Goal: Greater than 135     Basurto: N    HEMATOLOGY: no signs/symptoms of  active bleeding     DVT ppx: LMWH     ID:  afebrile, no signs/symptoms of infection ceftriaxone course complete    OTHER:     DISPOSITION: AR as per PMR eval once bed/authorization is available    CORE MEASURES:        Admission NIHSS: 11     TPA:  NO      LDL/HDL: 86/71     Depression Screen:  0     Statin Therapy: None due to IPH     Dysphagia Screen:  FAIL     SmokingNO      Afib  NO     [employer] Education  YES ASSESSMENT: 82y woman with HTN presents with R BG presumed hypertensive hemorrhage. Impression: R hypertensive ICH    NEURO: Neuro exam overall unchanged, with some fluctuation from day to day, Episode of facial twitching on 06/29,  EEG with intermittent polymorphic slowing right hemisphere and right PLEDS, continue keppra 500mg BID, plan to repeat EEG in 3 months to eval for PLEDS; at that time, if she has had no further clinical seizures, and if PLEDS have resolved, may consider stopping Keppra. Continue close monitoring for neurologic deterioration in setting of stable cerebral edema with mass effect and brain compression, titrate to normotension, hx of ETOH abuse, psych consult appreciated, Physical therapy/OT recommend AR, continue current rehab treatment  ANTITHROMBOTIC THERAPY: : none in setting of IPH    PULMONARY: CXR clear, protecting airway, saturating well     CARDIOVASCULAR:   cardiac monitoring without events, continue home cardiac regimen and titrate accordingly                        GASTROINTESTINAL::   s/p PEG placement (07/05) tolerating  feedings, IR team consult appreciated     Diet: PEG    RENAL:  good urine output     Na Goal: Greater than 135     Basurto: N    HEMATOLOGY: no signs/symptoms of  active bleeding     DVT ppx: LMWH     ID:  afebrile, no signs/symptoms of infection ceftriaxone course complete    OTHER:     DISPOSITION: AR as per PMR eval once bed/authorization is available    CORE MEASURES:        Admission NIHSS: 11     TPA:  NO      LDL/HDL: 86/71     Depression Screen:  0     Statin Therapy: None due to IPH     Dysphagia Screen:  FAIL     SmokingNO      Afib  NO     [employer] Education  YES

## 2017-07-08 NOTE — PROGRESS NOTE ADULT - SUBJECTIVE AND OBJECTIVE BOX
THE PATIENT WAS SEEN AND EXAMINED BY ME WITH THE HOUSESTAFF AND STROKE TEAM DURING MORNING ROUNDS.   HPI:  82y old RH woman with PMH HTN, GERD, ETOH abuse and on ASA who presented with a chief complaint of left sided weakness. No tPA given due to IPH on CTH.  At baseline walks with cane and has aide at home to assist with ADLs.  Admitted to stroke service for further workup and evaluation      MEDICATIONS    allopurinol, ASA, Citalopram, metoprolol, famotidine, synthroid, atorvastatin      SOCIAL HISTORY:  No history of tobacco or alcohol use     Allergies    No Known Allergies    Intolerances (26 Jun 2017 14:34)      SUBJECTIVE: No events overnight.  No new neurologic complaints.      nystatin Cream 1 Application(s) Topical every 8 hours  ondansetron    Tablet 4 milliGRAM(s) Oral every 6 hours PRN  lisinopril 40 milliGRAM(s) Oral daily  levothyroxine Injectable 25 MICROGram(s) IV Push daily  enoxaparin Injectable 40 milliGRAM(s) SubCutaneous daily  LORazepam     Tablet 1 milliGRAM(s) Oral every 8 hours PRN  acetaminophen    Suspension. 650 milliGRAM(s) Oral every 6 hours PRN  metoprolol 25 milliGRAM(s) Oral two times a day  levETIRAcetam  IVPB 500 milliGRAM(s) IV Intermittent every 12 hours  QUEtiapine 25 milliGRAM(s) Oral at bedtime  amLODIPine   Tablet 10 milliGRAM(s) Oral daily      PHYSICAL EXAM:   Vital Signs Last 24 Hrs  T(C): 36.6 (08 Jul 2017 07:12), Max: 37 (08 Jul 2017 05:30)  T(F): 97.9 (08 Jul 2017 07:12), Max: 98.6 (08 Jul 2017 05:30)  HR: 63 (08 Jul 2017 07:12) (61 - 80)  BP: 123/81 (08 Jul 2017 07:12) (123/81 - 165/86)  BP(mean): --  RR: 18 (08 Jul 2017 07:12) (16 - 18)  SpO2: 95% (08 Jul 2017 07:12) (95% - 99%)    General: No acute distress  HEENT: EOM intact, visual fields full  Abdomen: Soft, nontender, nondistended   Extremities: No edema    NEUROLOGICAL EXAM:  Mental status: eyes open, alert, oriented name, age and hospital, fluent speech, no neglect, perseverates at times, able to follow commands  Cranial Nerves: Subtle left facial palsy, no nystagmus, moderate dysarthria,   Motor exam: RUE 5/5,  RLE 5/5, Left hemiparesis with some effort against gravity LUE proximally 3-/5 with repetitive prompting,  and LLE dorsiflexion with minimal dorsiflexion, proximally with partial left hip and knee flexion, ? motor neglect  Sensation: Diminished on left side  Coordination/ Gait: No dysmetria, gait not assessed    LABS:         Hemoglobin A1C, Whole Blood: 5.5 % (07-03 @ 12:08)      IMAGING: Reviewed by me.     CT Head No Cont/ CERVICAL SPINE CT Head No Cont (06.26.1) Noncontrast CT brain: Acute right intraparenchymal hemorrhage  Acute intraparenchymal hemorrhage just lateral to the right thalamic body which measures 2.7 x 1.5 x 1.9 cm (AP x TV x CC). There is no midline shift or central herniation. CT cervicalspine:  No evidence for acute displaced fracture or traumatic malalignment. Cervical degenerative spondylosis  CT Head No Cont (06.27.17) >Grossly stable 2.9 x 1.9 cm parenchymal hemorrhage in the region of the posterior right lentiform nucleus.  No midline shift or hydrocephalus. MRA Head w/o Cont (06.28.17) Right thalamocapsular region hemorrhage as seen on the prior CT. No abnormal enhancement to suggest an underlying lesion.  Suspicion of a moderate stenosis approximately 2 cm distal to the origin of the right internal carotid artery. Atherosclerotic irregularity at the level of the left internal carotid artery is suggested. CT Abdomen and Pelvis No Cont (07.02) >Cholelithiasis.Diverticulosis. Age-indeterminate compression deformity of L1. Chronic compression deformity of T11.  A 1.8 cm low-attenuation right anterior lower pelvic subcutaneous nodule may represent a sebaceous cyst THE PATIENT WAS SEEN AND EXAMINED BY ME WITH THE HOUSESTAFF AND STROKE TEAM DURING MORNING ROUNDS.   HPI:  82y old RH woman with PMH HTN, GERD, ETOH abuse and on ASA who presented with a chief complaint of left sided weakness. No tPA given due to IPH on CTH.  At baseline walks with cane and has aide at home to assist with ADLs.  Admitted to stroke service for further workup and evaluation      MEDICATIONS    allopurinol, ASA, Citalopram, metoprolol, famotidine, synthroid, atorvastatin      SOCIAL HISTORY:  No history of tobacco or alcohol use     Allergies    No Known Allergies    Intolerances (26 Jun 2017 14:34)      SUBJECTIVE: No events overnight.  No new neurologic complaints.      nystatin Cream 1 Application(s) Topical every 8 hours  ondansetron    Tablet 4 milliGRAM(s) Oral every 6 hours PRN  lisinopril 40 milliGRAM(s) Oral daily  levothyroxine Injectable 25 MICROGram(s) IV Push daily  enoxaparin Injectable 40 milliGRAM(s) SubCutaneous daily  LORazepam     Tablet 1 milliGRAM(s) Oral every 8 hours PRN  acetaminophen    Suspension. 650 milliGRAM(s) Oral every 6 hours PRN  metoprolol 25 milliGRAM(s) Oral two times a day  levETIRAcetam  IVPB 500 milliGRAM(s) IV Intermittent every 12 hours  QUEtiapine 25 milliGRAM(s) Oral at bedtime  amLODIPine   Tablet 10 milliGRAM(s) Oral daily      PHYSICAL EXAM:   Vital Signs Last 24 Hrs  T(C): 36.6 (08 Jul 2017 07:12), Max: 37 (08 Jul 2017 05:30)  T(F): 97.9 (08 Jul 2017 07:12), Max: 98.6 (08 Jul 2017 05:30)  HR: 63 (08 Jul 2017 07:12) (61 - 80)  BP: 123/81 (08 Jul 2017 07:12) (123/81 - 165/86)  BP(mean): --  RR: 18 (08 Jul 2017 07:12) (16 - 18)  SpO2: 95% (08 Jul 2017 07:12) (95% - 99%)    General: No acute distress  HEENT: EOM intact, visual fields full  Abdomen: Soft, nontender, nondistended   Extremities: No edema    NEUROLOGICAL EXAM:  Mental status: eyes open, alert, oriented X3, fluent speech, no neglect, perseverates at times, able to follow commands  Cranial Nerves: Subtle left facial palsy, no nystagmus, moderate dysarthria,   Motor exam: RUE 5/5,  RLE 5/5, Left hemiparesis with some effort against gravity LUE proximally 2+/5 with repetitive prompting,  and LLE dorsiflexion trace of movemnt, proximally with partial left hip and knee flexion, ? motor neglect  Sensation: Diminished on left side  Coordination/ Gait: No dysmetria, gait not assessed    LABS:         Hemoglobin A1C, Whole Blood: 5.5 % (07-03 @ 12:08)      IMAGING: Reviewed by me.     CT Head No Cont/ CERVICAL SPINE CT Head No Cont (06.26.1) Noncontrast CT brain: Acute right intraparenchymal hemorrhage  Acute intraparenchymal hemorrhage just lateral to the right thalamic body which measures 2.7 x 1.5 x 1.9 cm (AP x TV x CC). There is no midline shift or central herniation. CT cervicalspine:  No evidence for acute displaced fracture or traumatic malalignment. Cervical degenerative spondylosis  CT Head No Cont (06.27.17) >Grossly stable 2.9 x 1.9 cm parenchymal hemorrhage in the region of the posterior right lentiform nucleus.  No midline shift or hydrocephalus. MRA Head w/o Cont (06.28.17) Right thalamocapsular region hemorrhage as seen on the prior CT. No abnormal enhancement to suggest an underlying lesion.  Suspicion of a moderate stenosis approximately 2 cm distal to the origin of the right internal carotid artery. Atherosclerotic irregularity at the level of the left internal carotid artery is suggested. CT Abdomen and Pelvis No Cont (07.02) >Cholelithiasis.Diverticulosis. Age-indeterminate compression deformity of L1. Chronic compression deformity of T11.  A 1.8 cm low-attenuation right anterior lower pelvic subcutaneous nodule may represent a sebaceous cyst THE PATIENT WAS SEEN AND EXAMINED BY ME WITH THE HOUSESTAFF AND STROKE TEAM DURING MORNING ROUNDS.   HPI:  82y old RH woman with PMH HTN, GERD, ETOH abuse and on ASA who presented with a chief complaint of left sided weakness. No tPA given due to IPH on CTH.  At baseline walks with cane and has aide at home to assist with ADLs.  Admitted to stroke service for further workup and evaluation      MEDICATIONS    allopurinol, ASA, Citalopram, metoprolol, famotidine, synthroid, atorvastatin      SOCIAL HISTORY:  No history of tobacco or alcohol use     Allergies    No Known Allergies    Intolerances (26 Jun 2017 14:34)      SUBJECTIVE: No events overnight.  No new neurologic complaints.      nystatin Cream 1 Application(s) Topical every 8 hours  ondansetron    Tablet 4 milliGRAM(s) Oral every 6 hours PRN  lisinopril 40 milliGRAM(s) Oral daily  levothyroxine Injectable 25 MICROGram(s) IV Push daily  enoxaparin Injectable 40 milliGRAM(s) SubCutaneous daily  LORazepam     Tablet 1 milliGRAM(s) Oral every 8 hours PRN  acetaminophen    Suspension. 650 milliGRAM(s) Oral every 6 hours PRN  metoprolol 25 milliGRAM(s) Oral two times a day  levETIRAcetam  IVPB 500 milliGRAM(s) IV Intermittent every 12 hours  QUEtiapine 25 milliGRAM(s) Oral at bedtime  amLODIPine   Tablet 10 milliGRAM(s) Oral daily      PHYSICAL EXAM:   Vital Signs Last 24 Hrs  T(C): 36.6 (08 Jul 2017 07:12), Max: 37 (08 Jul 2017 05:30)  T(F): 97.9 (08 Jul 2017 07:12), Max: 98.6 (08 Jul 2017 05:30)  HR: 63 (08 Jul 2017 07:12) (61 - 80)  BP: 123/81 (08 Jul 2017 07:12) (123/81 - 165/86)  BP(mean): --  RR: 18 (08 Jul 2017 07:12) (16 - 18)  SpO2: 95% (08 Jul 2017 07:12) (95% - 99%)    General: No acute distress  HEENT: EOM intact, visual fields full  Abdomen: Soft, nontender, nondistended   Extremities: No edema    NEUROLOGICAL EXAM:  Mental status: eyes open, alert, oriented X3, fluent speech, no neglect, perseverates at times, able to follow commands  Cranial Nerves: Subtle left facial palsy, no nystagmus, moderate dysarthria,   Motor exam: RUE 5/5,  RLE 5/5, Left hemiparesis with some effort against gravity LUE proximally 2+/5 with repetitive prompting,  and LLE dorsiflexion trace of movemnt, proximally with slight left hip and knee flexion, ? motor neglect  Sensation: Diminished on left side  Coordination/ Gait: No dysmetria, gait not assessed    LABS:         Hemoglobin A1C, Whole Blood: 5.5 % (07-03 @ 12:08)      IMAGING: Reviewed by me.     CT Head No Cont/ CERVICAL SPINE CT Head No Cont (06.26.1) Noncontrast CT brain: Acute right intraparenchymal hemorrhage  Acute intraparenchymal hemorrhage just lateral to the right thalamic body which measures 2.7 x 1.5 x 1.9 cm (AP x TV x CC). There is no midline shift or central herniation. CT cervicalspine:  No evidence for acute displaced fracture or traumatic malalignment. Cervical degenerative spondylosis  CT Head No Cont (06.27.17) >Grossly stable 2.9 x 1.9 cm parenchymal hemorrhage in the region of the posterior right lentiform nucleus.  No midline shift or hydrocephalus. MRA Head w/o Cont (06.28.17) Right thalamocapsular region hemorrhage as seen on the prior CT. No abnormal enhancement to suggest an underlying lesion.  Suspicion of a moderate stenosis approximately 2 cm distal to the origin of the right internal carotid artery. Atherosclerotic irregularity at the level of the left internal carotid artery is suggested. CT Abdomen and Pelvis No Cont (07.02) >Cholelithiasis.Diverticulosis. Age-indeterminate compression deformity of L1. Chronic compression deformity of T11.  A 1.8 cm low-attenuation right anterior lower pelvic subcutaneous nodule may represent a sebaceous cyst

## 2017-07-09 PROCEDURE — 99233 SBSQ HOSP IP/OBS HIGH 50: CPT

## 2017-07-09 RX ORDER — CITALOPRAM 10 MG/1
20 TABLET, FILM COATED ORAL DAILY
Qty: 0 | Refills: 0 | Status: DISCONTINUED | OUTPATIENT
Start: 2017-07-09 | End: 2017-07-12

## 2017-07-09 RX ORDER — ONDANSETRON 8 MG/1
4 TABLET, FILM COATED ORAL ONCE
Qty: 0 | Refills: 0 | Status: COMPLETED | OUTPATIENT
Start: 2017-07-09 | End: 2017-07-09

## 2017-07-09 RX ADMIN — Medication 25 MILLIGRAM(S): at 05:15

## 2017-07-09 RX ADMIN — CITALOPRAM 20 MILLIGRAM(S): 10 TABLET, FILM COATED ORAL at 16:14

## 2017-07-09 RX ADMIN — ONDANSETRON 4 MILLIGRAM(S): 8 TABLET, FILM COATED ORAL at 19:55

## 2017-07-09 RX ADMIN — Medication 25 MICROGRAM(S): at 05:15

## 2017-07-09 RX ADMIN — AMLODIPINE BESYLATE 10 MILLIGRAM(S): 2.5 TABLET ORAL at 05:15

## 2017-07-09 RX ADMIN — Medication 650 MILLIGRAM(S): at 17:49

## 2017-07-09 RX ADMIN — LEVETIRACETAM 400 MILLIGRAM(S): 250 TABLET, FILM COATED ORAL at 05:15

## 2017-07-09 RX ADMIN — LEVETIRACETAM 400 MILLIGRAM(S): 250 TABLET, FILM COATED ORAL at 17:49

## 2017-07-09 RX ADMIN — QUETIAPINE FUMARATE 25 MILLIGRAM(S): 200 TABLET, FILM COATED ORAL at 22:09

## 2017-07-09 RX ADMIN — ONDANSETRON 4 MILLIGRAM(S): 8 TABLET, FILM COATED ORAL at 22:08

## 2017-07-09 RX ADMIN — Medication 25 MILLIGRAM(S): at 17:50

## 2017-07-09 RX ADMIN — ENOXAPARIN SODIUM 40 MILLIGRAM(S): 100 INJECTION SUBCUTANEOUS at 12:49

## 2017-07-09 RX ADMIN — NYSTATIN CREAM 1 APPLICATION(S): 100000 CREAM TOPICAL at 22:09

## 2017-07-09 RX ADMIN — LISINOPRIL 40 MILLIGRAM(S): 2.5 TABLET ORAL at 05:15

## 2017-07-09 RX ADMIN — NYSTATIN CREAM 1 APPLICATION(S): 100000 CREAM TOPICAL at 05:15

## 2017-07-09 RX ADMIN — NYSTATIN CREAM 1 APPLICATION(S): 100000 CREAM TOPICAL at 13:25

## 2017-07-09 NOTE — PROGRESS NOTE ADULT - ASSESSMENT
ASSESSMENT: 82y woman with HTN presents with R BG presumed hypertensive hemorrhage. Impression: R hypertensive ICH    NEURO: Neuro exam overall unchanged, with some fluctuation from day to day, Episode of facial twitching on 06/29,  EEG with intermittent polymorphic slowing right hemisphere and right PLEDS, continue keppra 500mg BID, plan to repeat EEG in 3 months to eval for PLEDS; at that time, if she has had no further clinical seizures, and if PLEDS have resolved, may consider stopping Keppra. Continue close monitoring for neurologic deterioration in setting of stable cerebral edema with mass effect and brain compression, titrate to normotension, hx of ETOH abuse, psych consult appreciated, Physical therapy/OT recommend AR, continue current rehab treatment  ANTITHROMBOTIC THERAPY: : none in setting of IPH    PULMONARY: CXR clear, protecting airway, saturating well     CARDIOVASCULAR:   cardiac monitoring without events, continue home cardiac regimen and titrate accordingly                        GASTROINTESTINAL::   s/p PEG placement (07/05) tolerating  feedings, IR team consult appreciated     Diet: PEG    RENAL:  good urine output     Na Goal: Greater than 135     Basurto: N    HEMATOLOGY: no signs/symptoms of  active bleeding     DVT ppx: LMWH     ID:  afebrile, no signs/symptoms of infection ceftriaxone course complete    OTHER:     DISPOSITION: AR as per PMR eval once bed/authorization is available    CORE MEASURES:        Admission NIHSS: 11     TPA:  NO      LDL/HDL: 86/71     Depression Screen:  0     Statin Therapy: None due to IPH     Dysphagia Screen:  FAIL     SmokingNO      Afib  NO     Stoke Education  YES

## 2017-07-09 NOTE — PROGRESS NOTE ADULT - NSHPATTENDINGPLANDISCUSS_GEN_ALL_CORE
neuro resident and NP
neuro residents and PA
neuro resident and PA
neuro residents and PA
neurology PA
neurology residents and NP

## 2017-07-09 NOTE — PROGRESS NOTE ADULT - SUBJECTIVE AND OBJECTIVE BOX
THE PATIENT WAS SEEN AND EXAMINED BY ME WITH THE HOUSESTAFF AND STROKE TEAM DURING MORNING ROUNDS.   HPI:  82y old RH woman with PMH HTN, GERD, ETOH abuse and on ASA who presented with a chief complaint of left sided weakness. No tPA given due to IPH on CTH.  At baseline walks with cane and has aide at home to assist with ADLs.  Admitted to stroke service for further workup and evaluation          MEDICATIONS    allopurinol, ASA, Citalopram, metoprolol, famotidine, synthroid, atorvastatin      SOCIAL HISTORY:  No history of tobacco or alcohol use     Allergies    No Known Allergies    Intolerances (26 Jun 2017 14:34)      SUBJECTIVE: No events overnight.  No new neurologic complaints.      nystatin Cream 1 Application(s) Topical every 8 hours  ondansetron    Tablet 4 milliGRAM(s) Oral every 6 hours PRN  lisinopril 40 milliGRAM(s) Oral daily  levothyroxine Injectable 25 MICROGram(s) IV Push daily  enoxaparin Injectable 40 milliGRAM(s) SubCutaneous daily  acetaminophen    Suspension. 650 milliGRAM(s) Oral every 6 hours PRN  metoprolol 25 milliGRAM(s) Oral two times a day  levETIRAcetam  IVPB 500 milliGRAM(s) IV Intermittent every 12 hours  QUEtiapine 25 milliGRAM(s) Oral at bedtime  amLODIPine   Tablet 10 milliGRAM(s) Oral daily      PHYSICAL EXAM:   Vital Signs Last 24 Hrs  T(C): 36.4 (09 Jul 2017 07:20), Max: 36.9 (08 Jul 2017 11:59)  T(F): 97.6 (09 Jul 2017 07:20), Max: 98.4 (08 Jul 2017 11:59)  HR: 62 (09 Jul 2017 07:20) (62 - 74)  BP: 150/80 (09 Jul 2017 07:20) (126/65 - 156/92)  BP(mean): --  RR: 18 (09 Jul 2017 07:20) (18 - 18)  SpO2: 96% (09 Jul 2017 07:20) (96% - 100%)    General: No acute distress  HEENT: EOM intact, visual fields full  Abdomen: Soft, nontender, nondistended   Extremities: No edema    NEUROLOGICAL EXAM:  Mental status: eyes open, alert, oriented X3, fluent speech, no neglect, perseverates at times, able to follow commands  Cranial Nerves: Subtle left facial palsy, no nystagmus, moderate dysarthria,   Motor exam: RUE 5/5,  RLE 5/5, Left hemiparesis with some effort against gravity LUE proximally 2+/5 with repetitive prompting,  and LLE dorsiflexion trace of movemnt, proximally with slight left hip and knee flexion, ? motor neglect  Sensation: Diminished on left side  Coordination/ Gait: No dysmetria, gait not assessed       LABS:         Hemoglobin A1C, Whole Blood: 5.5 % (07-03 @ 12:08)      IMAGING: Reviewed by me.   CT Head No Cont/ CERVICAL SPINE CT Head No Cont (06.26.1) Noncontrast CT brain: Acute right intraparenchymal hemorrhage  Acute intraparenchymal hemorrhage just lateral to the right thalamic body which measures 2.7 x 1.5 x 1.9 cm (AP x TV x CC). There is no midline shift or central herniation. CT cervicalspine:  No evidence for acute displaced fracture or traumatic malalignment. Cervical degenerative spondylosis  CT Head No Cont (06.27.17) >Grossly stable 2.9 x 1.9 cm parenchymal hemorrhage in the region of the posterior right lentiform nucleus.  No midline shift or hydrocephalus. MRA Head w/o Cont (06.28.17) Right thalamocapsular region hemorrhage as seen on the prior CT. No abnormal enhancement to suggest an underlying lesion.  Suspicion of a moderate stenosis approximately 2 cm distal to the origin of the right internal carotid artery. Atherosclerotic irregularity at the level of the left internal carotid artery is suggested. CT Abdomen and Pelvis No Cont (07.02) >Cholelithiasis.Diverticulosis. Age-indeterminate compression deformity of L1. Chronic compression deformity of T11.  A 1.8 cm low-attenuation right anterior lower pelvic subcutaneous nodule may represent a sebaceous cyst THE PATIENT WAS SEEN AND EXAMINED BY ME WITH THE HOUSESTAFF AND STROKE TEAM DURING MORNING ROUNDS.   HPI:  82y old RH woman with PMH HTN, GERD, ETOH abuse and on ASA who presented with a chief complaint of left sided weakness. No tPA given due to IPH on CTH.  At baseline walks with cane and has aide at home to assist with ADLs.  Admitted to stroke service for further workup and evaluation          MEDICATIONS    allopurinol, ASA, Citalopram, metoprolol, famotidine, synthroid, atorvastatin      SOCIAL HISTORY:  No history of tobacco or alcohol use     Allergies    No Known Allergies    Intolerances (26 Jun 2017 14:34)      SUBJECTIVE: No events overnight.  No new neurologic complaints.      nystatin Cream 1 Application(s) Topical every 8 hours  ondansetron    Tablet 4 milliGRAM(s) Oral every 6 hours PRN  lisinopril 40 milliGRAM(s) Oral daily  levothyroxine Injectable 25 MICROGram(s) IV Push daily  enoxaparin Injectable 40 milliGRAM(s) SubCutaneous daily  acetaminophen    Suspension. 650 milliGRAM(s) Oral every 6 hours PRN  metoprolol 25 milliGRAM(s) Oral two times a day  levETIRAcetam  IVPB 500 milliGRAM(s) IV Intermittent every 12 hours  QUEtiapine 25 milliGRAM(s) Oral at bedtime  amLODIPine   Tablet 10 milliGRAM(s) Oral daily      PHYSICAL EXAM:   Vital Signs Last 24 Hrs  T(C): 36.4 (09 Jul 2017 07:20), Max: 36.9 (08 Jul 2017 11:59)  T(F): 97.6 (09 Jul 2017 07:20), Max: 98.4 (08 Jul 2017 11:59)  HR: 62 (09 Jul 2017 07:20) (62 - 74)  BP: 150/80 (09 Jul 2017 07:20) (126/65 - 156/92)  BP(mean): --  RR: 18 (09 Jul 2017 07:20) (18 - 18)  SpO2: 96% (09 Jul 2017 07:20) (96% - 100%)    General: No acute distress  HEENT: EOM intact, visual fields full  Abdomen: Soft, nontender, nondistended   Extremities: No edema    NEUROLOGICAL EXAM:  Mental status: eyes open, alert, oriented X3, fluent speech, no neglect, perseverates at times, able to follow commands  Cranial Nerves: Subtle left facial palsy, no nystagmus, moderate dysarthria,   Motor exam: RUE 5/5,  RLE 5/5, Left hemiparesis with some effort against gravity LUE proximally 3-/5 with repetitive prompting,  and LLE dorsiflexion trace of movement, proximally with slight left hip and knee flexion, ? motor neglect  Sensation: Diminished on left side  Coordination/ Gait: No dysmetria, gait not assessed       LABS:         Hemoglobin A1C, Whole Blood: 5.5 % (07-03 @ 12:08)      IMAGING: Reviewed by me.   CT Head No Cont/ CERVICAL SPINE CT Head No Cont (06.26.1) Noncontrast CT brain: Acute right intraparenchymal hemorrhage  Acute intraparenchymal hemorrhage just lateral to the right thalamic body which measures 2.7 x 1.5 x 1.9 cm (AP x TV x CC). There is no midline shift or central herniation. CT cervicalspine:  No evidence for acute displaced fracture or traumatic malalignment. Cervical degenerative spondylosis  CT Head No Cont (06.27.17) >Grossly stable 2.9 x 1.9 cm parenchymal hemorrhage in the region of the posterior right lentiform nucleus.  No midline shift or hydrocephalus. MRA Head w/o Cont (06.28.17) Right thalamocapsular region hemorrhage as seen on the prior CT. No abnormal enhancement to suggest an underlying lesion.  Suspicion of a moderate stenosis approximately 2 cm distal to the origin of the right internal carotid artery. Atherosclerotic irregularity at the level of the left internal carotid artery is suggested. CT Abdomen and Pelvis No Cont (07.02) >Cholelithiasis.Diverticulosis. Age-indeterminate compression deformity of L1. Chronic compression deformity of T11.  A 1.8 cm low-attenuation right anterior lower pelvic subcutaneous nodule may represent a sebaceous cyst

## 2017-07-10 PROCEDURE — 99233 SBSQ HOSP IP/OBS HIGH 50: CPT

## 2017-07-10 RX ORDER — PANTOPRAZOLE SODIUM 20 MG/1
40 TABLET, DELAYED RELEASE ORAL
Qty: 0 | Refills: 0 | Status: DISCONTINUED | OUTPATIENT
Start: 2017-07-10 | End: 2017-07-10

## 2017-07-10 RX ORDER — PANTOPRAZOLE SODIUM 20 MG/1
40 TABLET, DELAYED RELEASE ORAL
Qty: 0 | Refills: 0 | Status: DISCONTINUED | OUTPATIENT
Start: 2017-07-10 | End: 2017-07-12

## 2017-07-10 RX ADMIN — NYSTATIN CREAM 1 APPLICATION(S): 100000 CREAM TOPICAL at 06:14

## 2017-07-10 RX ADMIN — NYSTATIN CREAM 1 APPLICATION(S): 100000 CREAM TOPICAL at 21:11

## 2017-07-10 RX ADMIN — LISINOPRIL 40 MILLIGRAM(S): 2.5 TABLET ORAL at 06:12

## 2017-07-10 RX ADMIN — Medication 650 MILLIGRAM(S): at 13:34

## 2017-07-10 RX ADMIN — QUETIAPINE FUMARATE 25 MILLIGRAM(S): 200 TABLET, FILM COATED ORAL at 21:11

## 2017-07-10 RX ADMIN — Medication 650 MILLIGRAM(S): at 21:11

## 2017-07-10 RX ADMIN — LEVETIRACETAM 400 MILLIGRAM(S): 250 TABLET, FILM COATED ORAL at 06:13

## 2017-07-10 RX ADMIN — PANTOPRAZOLE SODIUM 40 MILLIGRAM(S): 20 TABLET, DELAYED RELEASE ORAL at 12:53

## 2017-07-10 RX ADMIN — Medication 650 MILLIGRAM(S): at 22:11

## 2017-07-10 RX ADMIN — NYSTATIN CREAM 1 APPLICATION(S): 100000 CREAM TOPICAL at 12:53

## 2017-07-10 RX ADMIN — Medication 25 MILLIGRAM(S): at 18:11

## 2017-07-10 RX ADMIN — Medication 650 MILLIGRAM(S): at 06:45

## 2017-07-10 RX ADMIN — Medication 650 MILLIGRAM(S): at 06:12

## 2017-07-10 RX ADMIN — AMLODIPINE BESYLATE 10 MILLIGRAM(S): 2.5 TABLET ORAL at 06:13

## 2017-07-10 RX ADMIN — LEVETIRACETAM 400 MILLIGRAM(S): 250 TABLET, FILM COATED ORAL at 18:12

## 2017-07-10 RX ADMIN — CITALOPRAM 20 MILLIGRAM(S): 10 TABLET, FILM COATED ORAL at 12:53

## 2017-07-10 RX ADMIN — Medication 650 MILLIGRAM(S): at 12:53

## 2017-07-10 RX ADMIN — Medication 25 MICROGRAM(S): at 06:14

## 2017-07-10 RX ADMIN — ENOXAPARIN SODIUM 40 MILLIGRAM(S): 100 INJECTION SUBCUTANEOUS at 12:53

## 2017-07-10 RX ADMIN — Medication 25 MILLIGRAM(S): at 06:13

## 2017-07-10 NOTE — PROGRESS NOTE ADULT - ASSESSMENT
ASSESSMENT: 82y woman with HTN presents with R BG presumed hypertensive hemorrhage. Impression: R hypertensive ICH    NEURO: Neuro exam overall without acute change,  Episode of facial twitching on 06/29,  EEG with intermittent polymorphic slowing right hemisphere and right PLEDS, on keppra 500mg BID, plan to repeat EEG in 3 months to eval for PLEDS; at that time, if she has had no further clinical seizures, and if PLEDS have resolved, may consider stopping Keppra. stable cerebral edema with mass effect and brain compression, titrate to normotension, hx of ETOH abuse, psych consult appreciated, Physical therapy/OT recommend AR, continue current rehab treatment  ANTITHROMBOTIC THERAPY: : none in setting of IPH    PULMONARY: CXR clear 6/30, protecting airway, saturating well     CARDIOVASCULAR:   cardiac monitoring without events, continue home cardiac regimen and titrate accordingly                        GASTROINTESTINAL::   s/p PEG placement (07/05) tolerating  feedings, IR team consult appreciated, Symptomatic control of GERD ,repeat SLP evaluation at rehab.     Diet: Jevity PEG TF     RENAL:  good urine output     Na Goal: Greater than 135     Basurto: N    HEMATOLOGY: no signs/symptoms of  active bleeding     DVT ppx: LMWH     ID:  afebrile, no signs/symptoms of infection, ceftriaxone course complete    OTHER: plan of care d/w patient at bedside, support provided in regards to patient being upset about NPO status.     DISPOSITION: AR as per PMR eval once bed/authorization is available    CORE MEASURES:        Admission NIHSS: 11     TPA:  NO      LDL/HDL: 86/71     Depression Screen:  0     Statin Therapy: None due to IPH     Dysphagia Screen:  FAIL     SmokingNO      Afib  NO     Stoke Education  YES ASSESSMENT:   82y woman with HTN presents with L hemiplegia, L facial droop and dysarthria. CT brain on admission and subsequent MRI brain showed right basal ganglial ICH and mild leukoaraiosis. MRA head and neck did not show any evidence of vascular malformation being the etiology of the ICH.     Impression:   Nontrauamtic R hemispheric ICH, subcortically located - likely etiology being hypertensive ICH.      NEURO: Neuro exam overall without acute change,  Episode of facial twitching on 06/29,  EEG with intermittent polymorphic slowing right hemisphere and right PLEDS, on keppra 500mg BID, plan to repeat EEG in 3 months to eval for PLEDS; at that time, if she has had no further clinical seizures, and if PLEDS have resolved, may consider stopping Keppra. stable cerebral edema with mass effect and brain compression, titrate to normotension, hx of ETOH abuse, psych consult appreciated, Physical therapy/OT recommend AR, continue current rehab treatment  ANTITHROMBOTIC THERAPY: None in setting of IPH and no specific indications at this time     PULMONARY: CXR clear 6/30, protecting airway, saturating well     CARDIOVASCULAR:   Cardiac monitoring without events, continue home cardiac regimen and titrate accordingly                        GASTROINTESTINAL:   S/p PEG placement (07/05) tolerating  feedings, IR team consult appreciated, Symptomatic control of GERD ,repeat SLP evaluation at rehab.     Diet: Jevity PEG TF     RENAL:  Good urine output     Na Goal: Greater than 135     Basurto: N    HEMATOLOGY: No signs/symptoms of  active bleeding     DVT ppx: LMWH     ID:  Afebrile, no signs/symptoms of infection, ceftriaxone course complete    OTHER: Plan of care d/w patient at bedside, support provided in regards to patient being upset about NPO status.     DISPOSITION: AR as per PMR eval once bed/authorization is available    CORE MEASURES:        Admission NIHSS: 11     TPA:  NO      LDL/HDL: 86/71     Depression Screen:  0     Statin Therapy: None due to IPH     Dysphagia Screen:  FAIL     SmokingNO      Afib  NO     Stoke Education  YES

## 2017-07-10 NOTE — CHART NOTE - NSCHARTNOTEFT_GEN_A_CORE
Pt is s/p PEG 7/5. Tolerating tube feeds, endorses desire for water and food in her mouth. Pt reports crying yesterday in frustration with not being able to eat food. Note, [facility] – [place], [place] 6/28 - recommends NPO with non oral hydration/nutrition/medications.    Source: Patient [X]    Family [ ]     other [X]: PCA    Diet : NPO/Enteral Nutrition via G-Tube      Patient reports [X] nausea- pt given medication, now resolved  [ ] vomiting [ ] diarrhea [ ] constipation  [ ]chewing problems [ ] swallowing issues  [ ] other:      Enteral /Parenteral Nutrition: Jevity 1.2 @ 60mL/hr x24hr. Provides 1728kcal, 80gm Protein, 1162mL FW    Current Weight: No updated weight in chart  % Weight Change    Pertinent Medications: MEDICATIONS  (STANDING):  nystatin Cream 1 Application(s) Topical every 8 hours  lisinopril 40 milliGRAM(s) Oral daily  levothyroxine Injectable 25 MICROGram(s) IV Push daily  enoxaparin Injectable 40 milliGRAM(s) SubCutaneous daily  metoprolol 25 milliGRAM(s) Oral two times a day  levETIRAcetam  IVPB 500 milliGRAM(s) IV Intermittent every 12 hours  QUEtiapine 25 milliGRAM(s) Oral at bedtime  amLODIPine   Tablet 10 milliGRAM(s) Oral daily  citalopram 20 milliGRAM(s) Oral daily  pantoprazole   Suspension 40 milliGRAM(s) Oral before breakfast    MEDICATIONS  (PRN):  ondansetron    Tablet 4 milliGRAM(s) Oral every 6 hours PRN Nausea and/or Vomiting  acetaminophen    Suspension. 650 milliGRAM(s) Oral every 6 hours PRN Moderate Pain (4 - 6)    Pertinent Labs:  No BMP drawn since 7/3    Skin: No pressure injuries noted  Edema: none noted today  BM: x1 7/10    Estimated Needs:   [X] no change since previous assessment  [ ] recalculated:       Previous Nutrition Diagnosis: [X] Swallowing difficulty    Nutrition Diagnosis is [X] ongoing - addressed with feeds  [ ] resolved [ ] not applicable        New Nutrition Diagnosis: [X] not applicable       Interventions:     Recommend    [ ] Change Diet To:    [ ] Nutrition Supplement    [X] Nutrition Support: Continue with Jevity 1.2 @goal of 60mL/hr x24hrs. Provides 1728kcal (21.2kcal/kg), 80 gm Protein (.98gm/kg), 1162mL FW (14.2 mL/kg) of dosing weight of 81.6kg.    [X] Other: Consider reevaluating swallow function as pt reports ability to swallow and desire to consume po food and water - last MBS 6/28.       Monitoring and Evaluation:     [ ] PO intake [X] Tolerance to diet prescription [X] weights [X] follow up per protocol    [X] other: RD remains available Tanya Rivas MBA RDN CDN Pager [phone]

## 2017-07-10 NOTE — PROGRESS NOTE ADULT - SUBJECTIVE AND OBJECTIVE BOX
THE PATIENT WAS SEEN AND EXAMINED BY ME WITH THE HOUSESTAFF AND STROKE TEAM DURING MORNING ROUNDS.   HPI:    82y old RH woman with PMH HTN, GERD, ETOH abuse and on ASA who presented with a chief complaint of left sided weakness. No tPA given due to IPH on CTH.  At baseline walks with cane and has aide at home to assist with ADLs.  Admitted to stroke service for further workup and evaluation    SUBJECTIVE: C/O headache (persistent since admission) and GERD (has a history). She is upset and wishes she could eat.     nystatin Cream 1 Application(s) Topical every 8 hours  ondansetron    Tablet 4 milliGRAM(s) Oral every 6 hours PRN  lisinopril 40 milliGRAM(s) Oral daily  levothyroxine Injectable 25 MICROGram(s) IV Push daily  enoxaparin Injectable 40 milliGRAM(s) SubCutaneous daily  acetaminophen    Suspension. 650 milliGRAM(s) Oral every 6 hours PRN  metoprolol 25 milliGRAM(s) Oral two times a day  levETIRAcetam  IVPB 500 milliGRAM(s) IV Intermittent every 12 hours  QUEtiapine 25 milliGRAM(s) Oral at bedtime  amLODIPine   Tablet 10 milliGRAM(s) Oral daily  citalopram 20 milliGRAM(s) Oral daily  pantoprazole   Suspension 40 milliGRAM(s) Oral before breakfast      PHYSICAL EXAM:   Vital Signs Last 24 Hrs  T(C): 36.9 (10 Jul 2017 11:35), Max: 37.3 (09 Jul 2017 17:31)  T(F): 98.5 (10 Jul 2017 11:35), Max: 99.2 (09 Jul 2017 17:31)  HR: 72 (10 Jul 2017 11:35) (61 - 94)  BP: 151/80 (10 Jul 2017 11:35) (126/75 - 159/95)  BP(mean): --  RR: 18 (10 Jul 2017 11:35) (18 - 18)  SpO2: 97% (10 Jul 2017 11:35) (94% - 98%)    General: No acute distress  HEENT: EOM intact, visual fields full  Abdomen: Soft, nontender, nondistended   Extremities: No edema    NEUROLOGICAL EXAM:  Mental status: eyes open, alert, oriented X3, fluent speech, no neglect, perseverates at times, able to follow commands  Cranial Nerves: Subtle left facial palsy, no nystagmus, moderate dysarthria, dysphagia  Motor exam: RUE 5/5,  RLE 5/5, Left hemiparesis with some effort against gravity LUE proximally 2/5 with repetitive prompting spastic , wrist 2/5,  and LLE dorsiflexion trace of movement  Sensation: Diminished on left side  Coordination/ Gait: No dysmetria, gait not assessed    LABS:     Hemoglobin A1C, Whole Blood: 5.5 % (07-03 @ 12:08)      IMAGING: Reviewed by me.   CT Head No Cont/ CERVICAL SPINE CT Head No Cont (06.26.1) Noncontrast CT brain: Acute right intraparenchymal hemorrhage  Acute intraparenchymal hemorrhage just lateral to the right thalamic body which measures 2.7 x 1.5 x 1.9 cm (AP x TV x CC). There is no midline shift or central herniation. CT cervicalspine:  No evidence for acute displaced fracture or traumatic malalignment. Cervical degenerative spondylosis  CT Head No Cont (06.27.17) >Grossly stable 2.9 x 1.9 cm parenchymal hemorrhage in the region of the posterior right lentiform nucleus.  No midline shift or hydrocephalus. MRI/MRA Head and neck w/o Cont (06.28.17) Right thalamocapsular region hemorrhage as seen on the prior CT. No abnormal enhancement to suggest an underlying lesion.  Suspicion of a moderate stenosis approximately 2 cm distal to the origin of the right internal carotid artery. Atherosclerotic irregularity at the level of the left internal carotid artery is suggested. CT Abdomen and Pelvis No Cont (07.02) >Cholelithiasis.Diverticulosis. Age-indeterminate compression deformity of L1. Chronic compression deformity of T11.  A 1.8 cm low-attenuation right anterior lower pelvic subcutaneous nodule may represent a sebaceous cyst THE PATIENT WAS SEEN AND EXAMINED BY ME WITH THE HOUSESTAFF AND STROKE TEAM DURING MORNING ROUNDS.     HPI:  82y old RH woman with PMH HTN, GERD, ETOH abuse and on ASA who presented with a chief complaint of left sided weakness. No tPA given due to IPH on CTH.  At baseline walks with cane and has aide at home to assist with ADLs.  Admitted to stroke service for further workup and evaluation    SUBJECTIVE: C/o headache (persistent since admission) and GERD (has a history). She is upset and wishes she could eat.     nystatin Cream 1 Application(s) Topical every 8 hours  ondansetron    Tablet 4 milliGRAM(s) Oral every 6 hours PRN  lisinopril 40 milliGRAM(s) Oral daily  levothyroxine Injectable 25 MICROGram(s) IV Push daily  enoxaparin Injectable 40 milliGRAM(s) SubCutaneous daily  acetaminophen    Suspension. 650 milliGRAM(s) Oral every 6 hours PRN  metoprolol 25 milliGRAM(s) Oral two times a day  levETIRAcetam  IVPB 500 milliGRAM(s) IV Intermittent every 12 hours  QUEtiapine 25 milliGRAM(s) Oral at bedtime  amLODIPine   Tablet 10 milliGRAM(s) Oral daily  citalopram 20 milliGRAM(s) Oral daily  pantoprazole   Suspension 40 milliGRAM(s) Oral before breakfast      PHYSICAL EXAM:   Vital Signs Last 24 Hrs  T(C): 36.9 (10 Jul 2017 11:35), Max: 37.3 (09 Jul 2017 17:31)  T(F): 98.5 (10 Jul 2017 11:35), Max: 99.2 (09 Jul 2017 17:31)  HR: 72 (10 Jul 2017 11:35) (61 - 94)  BP: 151/80 (10 Jul 2017 11:35) (126/75 - 159/95)  BP(mean): --  RR: 18 (10 Jul 2017 11:35) (18 - 18)  SpO2: 97% (10 Jul 2017 11:35) (94% - 98%)    General: No acute distress  HEENT: EOM intact, visual fields full  Abdomen: Soft, nontender, nondistended   Extremities: No edema    NEUROLOGICAL EXAM:  Mental status: eyes open, alert, oriented X3, fluent speech, no neglect, perseverates at times, able to follow commands  Cranial Nerves: Subtle left facial palsy, no nystagmus, moderate dysarthria, dysphagia  Motor exam: RUE 5/5,  RLE 5/5, Left hemiparesis with some effort against gravity LUE proximally 2/5 with repetitive prompting spastic , wrist 2/5,  and LLE dorsiflexion trace of movement  Sensation: Diminished on left side  Coordination/ Gait: No dysmetria, gait not assessed    LABS:     Hemoglobin A1C, Whole Blood: 5.5 % (07-03 @ 12:08)      IMAGING: Reviewed by me.   CT Head No Cont/ CERVICAL SPINE CT Head No Cont (06.26.1) Noncontrast CT brain: Acute right intraparenchymal hemorrhage  Acute intraparenchymal hemorrhage just lateral to the right thalamic body which measures 2.7 x 1.5 x 1.9 cm (AP x TV x CC). There is no midline shift or central herniation. CT cervicalspine:  No evidence for acute displaced fracture or traumatic malalignment. Cervical degenerative spondylosis  CT Head No Cont (06.27.17) >Grossly stable 2.9 x 1.9 cm parenchymal hemorrhage in the region of the posterior right lentiform nucleus.  No midline shift or hydrocephalus. MRI/MRA Head and neck w/o Cont (06.28.17) Right thalamocapsular region hemorrhage as seen on the prior CT. No abnormal enhancement to suggest an underlying lesion.  Suspicion of a moderate stenosis approximately 2 cm distal to the origin of the right internal carotid artery. Atherosclerotic irregularity at the level of the left internal carotid artery is suggested. CT Abdomen and Pelvis No Cont (07.02) >Cholelithiasis.Diverticulosis. Age-indeterminate compression deformity of L1. Chronic compression deformity of T11.  A 1.8 cm low-attenuation right anterior lower pelvic subcutaneous nodule may represent a sebaceous cyst

## 2017-07-11 PROCEDURE — 99232 SBSQ HOSP IP/OBS MODERATE 35: CPT

## 2017-07-11 PROCEDURE — 99233 SBSQ HOSP IP/OBS HIGH 50: CPT

## 2017-07-11 RX ORDER — PANTOPRAZOLE SODIUM 20 MG/1
40 TABLET, DELAYED RELEASE ORAL
Qty: 0 | Refills: 0 | COMMUNITY
Start: 2017-07-11

## 2017-07-11 RX ADMIN — ENOXAPARIN SODIUM 40 MILLIGRAM(S): 100 INJECTION SUBCUTANEOUS at 12:25

## 2017-07-11 RX ADMIN — QUETIAPINE FUMARATE 25 MILLIGRAM(S): 200 TABLET, FILM COATED ORAL at 23:59

## 2017-07-11 RX ADMIN — AMLODIPINE BESYLATE 10 MILLIGRAM(S): 2.5 TABLET ORAL at 05:23

## 2017-07-11 RX ADMIN — NYSTATIN CREAM 1 APPLICATION(S): 100000 CREAM TOPICAL at 12:22

## 2017-07-11 RX ADMIN — Medication 25 MILLIGRAM(S): at 05:23

## 2017-07-11 RX ADMIN — Medication 25 MILLIGRAM(S): at 18:42

## 2017-07-11 RX ADMIN — NYSTATIN CREAM 1 APPLICATION(S): 100000 CREAM TOPICAL at 05:23

## 2017-07-11 RX ADMIN — Medication 650 MILLIGRAM(S): at 06:30

## 2017-07-11 RX ADMIN — Medication 25 MICROGRAM(S): at 05:23

## 2017-07-11 RX ADMIN — LISINOPRIL 40 MILLIGRAM(S): 2.5 TABLET ORAL at 05:23

## 2017-07-11 RX ADMIN — Medication 650 MILLIGRAM(S): at 14:21

## 2017-07-11 RX ADMIN — PANTOPRAZOLE SODIUM 40 MILLIGRAM(S): 20 TABLET, DELAYED RELEASE ORAL at 05:30

## 2017-07-11 RX ADMIN — LEVETIRACETAM 400 MILLIGRAM(S): 250 TABLET, FILM COATED ORAL at 18:40

## 2017-07-11 RX ADMIN — LEVETIRACETAM 400 MILLIGRAM(S): 250 TABLET, FILM COATED ORAL at 05:23

## 2017-07-11 RX ADMIN — Medication 650 MILLIGRAM(S): at 05:30

## 2017-07-11 RX ADMIN — Medication 650 MILLIGRAM(S): at 13:49

## 2017-07-11 RX ADMIN — CITALOPRAM 20 MILLIGRAM(S): 10 TABLET, FILM COATED ORAL at 12:25

## 2017-07-11 NOTE — PROGRESS NOTE ADULT - SUBJECTIVE AND OBJECTIVE BOX
Tolerating PT/OT  No CP, No SOB      MEDICATIONS  (STANDING):  nystatin Cream 1 Application(s) Topical every 8 hours  lisinopril 40 milliGRAM(s) Oral daily  levothyroxine Injectable 25 MICROGram(s) IV Push daily  enoxaparin Injectable 40 milliGRAM(s) SubCutaneous daily  metoprolol 25 milliGRAM(s) Oral two times a day  levETIRAcetam  IVPB 500 milliGRAM(s) IV Intermittent every 12 hours  QUEtiapine 25 milliGRAM(s) Oral at bedtime  amLODIPine   Tablet 10 milliGRAM(s) Oral daily  citalopram 20 milliGRAM(s) Oral daily  pantoprazole   Suspension 40 milliGRAM(s) Oral before breakfast    MEDICATIONS  (PRN):  ondansetron    Tablet 4 milliGRAM(s) Oral every 6 hours PRN Nausea and/or Vomiting  acetaminophen    Suspension. 650 milliGRAM(s) Oral every 6 hours PRN Moderate Pain (4 - 6)    Vital Signs Last 24 Hrs  T(C): 36.6 (11 Jul 2017 08:38), Max: 36.9 (10 Jul 2017 11:35)  T(F): 97.9 (11 Jul 2017 08:38), Max: 98.5 (10 Jul 2017 11:35)  HR: 64 (11 Jul 2017 08:38) (62 - 73)  BP: 135/73 (11 Jul 2017 08:38) (118/72 - 151/80)  BP(mean): --  RR: 20 (11 Jul 2017 08:38) (17 - 20)  SpO2: 96% (11 Jul 2017 08:38) (92% - 97%)  ----------------------------------------------------------------------------------------  PHYSICAL EXAM  Neurologic Exam -                    Cognitive - Awake, Alert,     Communication - +dysarthria,      Cranial Nerves - left facial droop     Motor - 5/5 in RUE and RLE. Left: Trace proximal LLE and LUE, 0/5 distal LUE and distal LLE     Sensory - diminished to LT over LUE and LLE     Reflexes - DTR Intact,       Impression:  81 yo F with right IPH with left hemiparesis, dysarthria    Plan:  PT- ROM, Bed Mob, Transfers, Amb w AD and bracing as needed  OT- ADLs, bracing  SLP- Speech/Dysphagia eval and treat  Prec- Falls  DVT Prophylaxis Lovenox  Skin- Turn q2 h. Check skin daily. Recommend L resting hand splint and PRAFO  Dispo- Recommend acute rehab on dc as pt could tolerate 3 hrs/day PT/OT/SLP

## 2017-07-11 NOTE — PROGRESS NOTE ADULT - ASSESSMENT
82 year old Female with HTN/MI/ ETOH abuse admitted with acute hemorrhagic CVA  / UTI/   now dysphagia     1. cv stable no chest pain or sob.  no events on tele   no decompensated CHF on exam       2. Acute hemorrhagic CVA   presumed hypertensive hemorrhage  neuro f/u     3. HTN; chronic    goal for BP < 160/90 per neuro  current BP acceptable   continue with amlodipine/ metoprolol  BP control per neuro      4. Dysphagia  s/p Peg placement   gi f.u      5. DVT PPX

## 2017-07-11 NOTE — PROGRESS NOTE ADULT - ASSESSMENT
ASSESSMENT:   82y woman with HTN presents with L hemiplegia, L facial droop and dysarthria. CT brain on admission and subsequent MRI brain showed right basal ganglial ICH and mild leukoaraiosis. MRA head and neck did not show any evidence of vascular malformation being the etiology of the ICH.     Impression:   Nontrauamtic R hemispheric ICH, subcortically located - likely etiology being hypertensive ICH.      NEURO: Neuro exam overall without acute change,  Episode of facial twitching on 06/29,  EEG with intermittent polymorphic slowing right hemisphere and right PLEDS, on keppra 500mg BID, plan to repeat EEG in 3 months to eval for PLEDS; at that time, if she has had no further clinical seizures, and if PLEDS have resolved, may consider stopping Keppra. stable cerebral edema with mass effect and brain compression, titrate to normotension, hx of ETOH abuse, psych consult appreciated, Physical therapy/OT recommend AR, continue current rehab treatment  ANTITHROMBOTIC THERAPY: None in setting of IPH and no specific indications at this time     PULMONARY: CXR clear 6/30, protecting airway, saturating well     CARDIOVASCULAR:   Cardiac monitoring without events, continue home cardiac regimen and titrate accordingly                        GASTROINTESTINAL:   S/p PEG placement (07/05) tolerating  feedings, IR team consult appreciated, PPI for GERD ,repeat SLP evaluation at rehab.     Diet: Jevity PEG TF     RENAL:  Good urine output     Na Goal: Greater than 135     Basurto: N    HEMATOLOGY: No signs/symptoms of  active bleeding     DVT ppx: LMWH     ID:  Afebrile, no signs/symptoms of infection    OTHER: Plan of care d/w patient at bedside.     DISPOSITION: AR as per PMR eval once bed/authorization is available    CORE MEASURES:        Admission NIHSS: 11     TPA:  NO      LDL/HDL: 86/71     Depression Screen:  0     Statin Therapy: None due to IPH     Dysphagia Screen:  FAIL     SmokingNO      Afib  NO     [employer]  YES ASSESSMENT:   81 Y/O R handed woman with HTN presents with L hemiplegia, L facial droop and dysarthria. CT brain on admission and subsequent MRI brain showed right basal ganglial ICH and mild leukoaraiosis. MRA head and neck did not show any evidence of vascular malformation being the etiology of the ICH.     Impression:   Nontrauamtic R hemispheric subcortically located ICH - likely etiology being hypertensive ICH      NEURO: Neuro exam overall without acute change, Episode of facial twitching on 06/29,  EEG with intermittent polymorphic slowing right hemisphere and right PLEDS, on keppra 500mg BID, plan to repeat EEG in 3 months to eval for PLEDS; at that time, if she has had no further clinical seizures, and if PLEDS have resolved, may consider stopping Keppra. stable cerebral edema with mass effect and brain compression, titrate to normotension, hx of ETOH abuse, psych consult appreciated, Physical therapy/OT recommend AR, continue current rehab treatment  ANTITHROMBOTIC THERAPY: None in setting of IPH and no specific indications at this time     PULMONARY: CXR clear 6/30, protecting airway, saturating well     CARDIOVASCULAR:   Cardiac monitoring without events, continue home cardiac regimen and titrate accordingly                        GASTROINTESTINAL:   S/p PEG placement (07/05) tolerating  feedings, IR team consult appreciated, PPI for GERD, repeat SLP evaluation at rehab.     Diet: Jevity PEG TF    RENAL: Good urine output     Na Goal: Greater than 135     Basurto: N    HEMATOLOGY: No signs/symptoms of  active bleeding     DVT ppx: LMWH     ID:  Afebrile, no signs/symptoms of infection    OTHER: Plan of care d/w patient at bedside.     DISPOSITION: AR as per PMR eval once bed/authorization is available    CORE MEASURES:        Admission NIHSS: 11     TPA:  NO      LDL/HDL: 86/71     Depression Screen:  0     Statin Therapy: None due to IPH     Dysphagia Screen:  FAIL     SmokingNO      Afib  NO     [employer]  YES

## 2017-07-11 NOTE — PROGRESS NOTE ADULT - SUBJECTIVE AND OBJECTIVE BOX
CARDIOLOGY FOLLOW UP     CC no chest pain or sob       PHYSICAL EXAM:  T(C): 36.6 (07-11-17 @ 08:38), Max: 36.9 (07-10-17 @ 11:35)  HR: 64 (07-11-17 @ 08:38) (62 - 73)  BP: 135/73 (07-11-17 @ 08:38) (118/72 - 151/80)  RR: 20 (07-11-17 @ 08:38) (17 - 20)  SpO2: 96% (07-11-17 @ 08:38) (92% - 97%)  Wt(kg): --  I&O's Summary      Appearance: Normal	  Cardiovascular: Normal S1 S2,RRR, No JVD, No murmurs  Respiratory: Lungs clear to auscultation	  Gastrointestinal:  Soft, Non-tender, + BS	+ peg   Extremities: Normal range of motion, No clubbing, cyanosis or edema        MEDICATIONS  (STANDING):  nystatin Cream 1 Application(s) Topical every 8 hours  lisinopril 40 milliGRAM(s) Oral daily  levothyroxine Injectable 25 MICROGram(s) IV Push daily  enoxaparin Injectable 40 milliGRAM(s) SubCutaneous daily  metoprolol 25 milliGRAM(s) Oral two times a day  levETIRAcetam  IVPB 500 milliGRAM(s) IV Intermittent every 12 hours  QUEtiapine 25 milliGRAM(s) Oral at bedtime  amLODIPine   Tablet 10 milliGRAM(s) Oral daily  citalopram 20 milliGRAM(s) Oral daily  pantoprazole   Suspension 40 milliGRAM(s) Oral before breakfast      TELEMETRY: 	  NSR     OTHER: 	    LABS:

## 2017-07-11 NOTE — PROGRESS NOTE ADULT - SUBJECTIVE AND OBJECTIVE BOX
THE PATIENT WAS SEEN AND EXAMINED BY ME WITH THE HOUSESTAFF AND STROKE TEAM DURING MORNING ROUNDS.   HPI:82y old RH woman with PMH HTN, GERD, ETOH abuse and on ASA who presented with a chief complaint of left sided weakness. No tPA given due to IPH on CTH.  At baseline walks with cane and has aide at home to assist with ADLs.  Admitted to stroke service for further workup and evaluation    SUBJECTIVE: No events overnight.  No new neurologic complaints.      nystatin Cream 1 Application(s) Topical every 8 hours  ondansetron    Tablet 4 milliGRAM(s) Oral every 6 hours PRN  lisinopril 40 milliGRAM(s) Oral daily  levothyroxine Injectable 25 MICROGram(s) IV Push daily  enoxaparin Injectable 40 milliGRAM(s) SubCutaneous daily  acetaminophen    Suspension. 650 milliGRAM(s) Oral every 6 hours PRN  metoprolol 25 milliGRAM(s) Oral two times a day  levETIRAcetam  IVPB 500 milliGRAM(s) IV Intermittent every 12 hours  QUEtiapine 25 milliGRAM(s) Oral at bedtime  amLODIPine   Tablet 10 milliGRAM(s) Oral daily  citalopram 20 milliGRAM(s) Oral daily  pantoprazole   Suspension 40 milliGRAM(s) Oral before breakfast      PHYSICAL EXAM:   Vital Signs Last 24 Hrs  T(C): 36.4 (11 Jul 2017 12:31), Max: 36.8 (10 Jul 2017 21:00)  T(F): 97.6 (11 Jul 2017 12:31), Max: 98.3 (10 Jul 2017 21:00)  HR: 66 (11 Jul 2017 12:31) (62 - 73)  BP: 126/64 (11 Jul 2017 12:31) (118/72 - 140/68)  BP(mean): --  RR: 22 (11 Jul 2017 12:31) (17 - 22)  SpO2: 96% (11 Jul 2017 12:31) (92% - 96%)    General: No acute distress  HEENT: EOM intact, visual fields full  Abdomen: Soft, nontender, nondistended   Extremities: No edema    NEUROLOGICAL EXAM:  Mental status: eyes open, alert, oriented X3, fluent speech, no neglect, perseverates at times, able to follow commands  Cranial Nerves: Subtle left facial palsy, no nystagmus, moderate dysarthria, dysphagia  Motor exam: RUE 5/5,  RLE 5/5, Left hemiparesis with some effort against gravity LUE proximally 2/5 with repetitive prompting spastic , wrist 2/5,  and LLE dorsiflexion trace of movement  Sensation: Diminished on left side  Coordination/ Gait: No dysmetria, gait not assessed    LABS:         Hemoglobin A1C, Whole Blood: 5.5 % (07-03 @ 12:08)      IMAGING: Reviewed by me.     CT Head No Cont/ CERVICAL SPINE CT Head No Cont (06.26.1) Noncontrast CT brain: Acute right intraparenchymal hemorrhage  Acute intraparenchymal hemorrhage just lateral to the right thalamic body which measures 2.7 x 1.5 x 1.9 cm (AP x TV x CC). There is no midline shift or central herniation. CT cervicalspine:  No evidence for acute displaced fracture or traumatic malalignment. Cervical degenerative spondylosis  CT Head No Cont (06.27.17) >Grossly stable 2.9 x 1.9 cm parenchymal hemorrhage in the region of the posterior right lentiform nucleus.  No midline shift or hydrocephalus. MRI/MRA Head and neck w/o Cont (06.28.17) Right thalamocapsular region hemorrhage as seen on the prior CT. No abnormal enhancement to suggest an underlying lesion.  Suspicion of a moderate stenosis approximately 2 cm distal to the origin of the right internal carotid artery. Atherosclerotic irregularity at the level of the left internal carotid artery is suggested. CT Abdomen and Pelvis No Cont (07.02) >Cholelithiasis.Diverticulosis. Age-indeterminate compression deformity of L1. Chronic compression deformity of T11.  A 1.8 cm low-attenuation right anterior lower pelvic subcutaneous nodule may represent a sebaceous cyst THE PATIENT WAS SEEN AND EXAMINED BY ME WITH THE HOUSESTAFF AND STROKE TEAM DURING MORNING ROUNDS.     HPI:  82y Y/O RH woman with PMH HTN, GERD, ETOH abuse and on ASA who presented with a chief complaint of left sided weakness. No tPA given due to IPH on CTH.  At baseline walks with cane and has aide at home to assist with ADLs.  Admitted to stroke service for further workup and evaluation    SUBJECTIVE: No events overnight.  No new neurologic complaints.      nystatin Cream 1 Application(s) Topical every 8 hours  ondansetron    Tablet 4 milliGRAM(s) Oral every 6 hours PRN  lisinopril 40 milliGRAM(s) Oral daily  levothyroxine Injectable 25 MICROGram(s) IV Push daily  enoxaparin Injectable 40 milliGRAM(s) SubCutaneous daily  acetaminophen    Suspension. 650 milliGRAM(s) Oral every 6 hours PRN  metoprolol 25 milliGRAM(s) Oral two times a day  levETIRAcetam  IVPB 500 milliGRAM(s) IV Intermittent every 12 hours  QUEtiapine 25 milliGRAM(s) Oral at bedtime  amLODIPine   Tablet 10 milliGRAM(s) Oral daily  citalopram 20 milliGRAM(s) Oral daily  pantoprazole   Suspension 40 milliGRAM(s) Oral before breakfast    PHYSICAL EXAM:   Vital Signs Last 24 Hrs  T(C): 36.4 (11 Jul 2017 12:31), Max: 36.8 (10 Jul 2017 21:00)  T(F): 97.6 (11 Jul 2017 12:31), Max: 98.3 (10 Jul 2017 21:00)  HR: 66 (11 Jul 2017 12:31) (62 - 73)  BP: 126/64 (11 Jul 2017 12:31) (118/72 - 140/68)  BP(mean): --  RR: 22 (11 Jul 2017 12:31) (17 - 22)  SpO2: 96% (11 Jul 2017 12:31) (92% - 96%)    General: No acute distress  HEENT: EOM intact, visual fields full  Abdomen: Soft, nontender, nondistended   Extremities: No edema    NEUROLOGICAL EXAM:  Mental status: eyes open, alert, oriented X3, fluent speech, no neglect, perseverates at times, able to follow commands  Cranial Nerves: Subtle left facial palsy, no nystagmus, moderate dysarthria, dysphagia  Motor exam: RUE 5/5,  RLE 5/5, Left hemiparesis with some effort against gravity LUE proximally 2/5 with repetitive prompting spastic , wrist 2/5,  and LLE dorsiflexion trace of movement  Sensation: Diminished on left side  Coordination/ Gait: No dysmetria, gait not assessed    LABS:  Hemoglobin A1C, Whole Blood: 5.5 % (07-03 @ 12:08)    IMAGING: Reviewed by me.     CT Head No Cont/ CERVICAL SPINE CT Head No Cont (06.26.1) Noncontrast CT brain: Acute right intraparenchymal hemorrhage  Acute intraparenchymal hemorrhage just lateral to the right thalamic body which measures 2.7 x 1.5 x 1.9 cm (AP x TV x CC). There is no midline shift or central herniation. CT cervicalspine:  No evidence for acute displaced fracture or traumatic malalignment. Cervical degenerative spondylosis  CT Head No Cont (06.27.17) >Grossly stable 2.9 x 1.9 cm parenchymal hemorrhage in the region of the posterior right lentiform nucleus.  No midline shift or hydrocephalus. MRI/MRA Head and neck w/o Cont (06.28.17) Right thalamocapsular region hemorrhage as seen on the prior CT. No abnormal enhancement to suggest an underlying lesion.  Suspicion of a moderate stenosis approximately 2 cm distal to the origin of the right internal carotid artery. Atherosclerotic irregularity at the level of the left internal carotid artery is suggested. CT Abdomen and Pelvis No Cont (07.02) >Cholelithiasis.Diverticulosis. Age-indeterminate compression deformity of L1. Chronic compression deformity of T11.  A 1.8 cm low-attenuation right anterior lower pelvic subcutaneous nodule may represent a sebaceous cyst

## 2017-07-12 VITALS
HEART RATE: 61 BPM | SYSTOLIC BLOOD PRESSURE: 111 MMHG | DIASTOLIC BLOOD PRESSURE: 59 MMHG | RESPIRATION RATE: 20 BRPM | OXYGEN SATURATION: 98 % | TEMPERATURE: 98 F

## 2017-07-12 PROCEDURE — 72170 X-RAY EXAM OF PELVIS: CPT

## 2017-07-12 PROCEDURE — 88305 TISSUE EXAM BY PATHOLOGIST: CPT

## 2017-07-12 PROCEDURE — 70553 MRI BRAIN STEM W/O & W/DYE: CPT

## 2017-07-12 PROCEDURE — 85027 COMPLETE CBC AUTOMATED: CPT

## 2017-07-12 PROCEDURE — 70549 MR ANGIOGRAPH NECK W/O&W/DYE: CPT

## 2017-07-12 PROCEDURE — 87086 URINE CULTURE/COLONY COUNT: CPT

## 2017-07-12 PROCEDURE — 74176 CT ABD & PELVIS W/O CONTRAST: CPT

## 2017-07-12 PROCEDURE — 93005 ELECTROCARDIOGRAM TRACING: CPT

## 2017-07-12 PROCEDURE — 99233 SBSQ HOSP IP/OBS HIGH 50: CPT

## 2017-07-12 PROCEDURE — G0515: CPT

## 2017-07-12 PROCEDURE — 84484 ASSAY OF TROPONIN QUANT: CPT

## 2017-07-12 PROCEDURE — 97110 THERAPEUTIC EXERCISES: CPT

## 2017-07-12 PROCEDURE — 80307 DRUG TEST PRSMV CHEM ANLYZR: CPT

## 2017-07-12 PROCEDURE — 72125 CT NECK SPINE W/O DYE: CPT

## 2017-07-12 PROCEDURE — 97163 PT EVAL HIGH COMPLEX 45 MIN: CPT

## 2017-07-12 PROCEDURE — 83735 ASSAY OF MAGNESIUM: CPT

## 2017-07-12 PROCEDURE — 95951: CPT

## 2017-07-12 PROCEDURE — 85014 HEMATOCRIT: CPT

## 2017-07-12 PROCEDURE — A9585: CPT

## 2017-07-12 PROCEDURE — 97116 GAIT TRAINING THERAPY: CPT

## 2017-07-12 PROCEDURE — 97112 NEUROMUSCULAR REEDUCATION: CPT

## 2017-07-12 PROCEDURE — 73030 X-RAY EXAM OF SHOULDER: CPT

## 2017-07-12 PROCEDURE — C1769: CPT

## 2017-07-12 PROCEDURE — 82553 CREATINE MB FRACTION: CPT

## 2017-07-12 PROCEDURE — 70544 MR ANGIOGRAPHY HEAD W/O DYE: CPT

## 2017-07-12 PROCEDURE — 80076 HEPATIC FUNCTION PANEL: CPT

## 2017-07-12 PROCEDURE — 82803 BLOOD GASES ANY COMBINATION: CPT

## 2017-07-12 PROCEDURE — 83036 HEMOGLOBIN GLYCOSYLATED A1C: CPT

## 2017-07-12 PROCEDURE — 49440 PLACE GASTROSTOMY TUBE PERC: CPT

## 2017-07-12 PROCEDURE — 74018 RADEX ABDOMEN 1 VIEW: CPT

## 2017-07-12 PROCEDURE — 93306 TTE W/DOPPLER COMPLETE: CPT

## 2017-07-12 PROCEDURE — 82435 ASSAY OF BLOOD CHLORIDE: CPT

## 2017-07-12 PROCEDURE — 84295 ASSAY OF SERUM SODIUM: CPT

## 2017-07-12 PROCEDURE — 81001 URINALYSIS AUTO W/SCOPE: CPT

## 2017-07-12 PROCEDURE — 80048 BASIC METABOLIC PNL TOTAL CA: CPT

## 2017-07-12 PROCEDURE — 82550 ASSAY OF CK (CPK): CPT

## 2017-07-12 PROCEDURE — L8699: CPT

## 2017-07-12 PROCEDURE — 97166 OT EVAL MOD COMPLEX 45 MIN: CPT

## 2017-07-12 PROCEDURE — 92611 MOTION FLUOROSCOPY/SWALLOW: CPT

## 2017-07-12 PROCEDURE — 70450 CT HEAD/BRAIN W/O DYE: CPT

## 2017-07-12 PROCEDURE — 95819 EEG AWAKE AND ASLEEP: CPT

## 2017-07-12 PROCEDURE — 97760 ORTHOTIC MGMT&TRAING 1ST ENC: CPT

## 2017-07-12 PROCEDURE — 84132 ASSAY OF SERUM POTASSIUM: CPT

## 2017-07-12 PROCEDURE — 71045 X-RAY EXAM CHEST 1 VIEW: CPT

## 2017-07-12 PROCEDURE — 92610 EVALUATE SWALLOWING FUNCTION: CPT

## 2017-07-12 PROCEDURE — 74230 X-RAY XM SWLNG FUNCJ C+: CPT

## 2017-07-12 PROCEDURE — 97530 THERAPEUTIC ACTIVITIES: CPT

## 2017-07-12 PROCEDURE — 73562 X-RAY EXAM OF KNEE 3: CPT

## 2017-07-12 PROCEDURE — 95957 EEG DIGITAL ANALYSIS: CPT

## 2017-07-12 PROCEDURE — C1892: CPT

## 2017-07-12 PROCEDURE — C1894: CPT

## 2017-07-12 PROCEDURE — 80061 LIPID PANEL: CPT

## 2017-07-12 PROCEDURE — 82947 ASSAY GLUCOSE BLOOD QUANT: CPT

## 2017-07-12 PROCEDURE — 84100 ASSAY OF PHOSPHORUS: CPT

## 2017-07-12 PROCEDURE — 80053 COMPREHEN METABOLIC PANEL: CPT

## 2017-07-12 PROCEDURE — 92523 SPEECH SOUND LANG COMPREHEN: CPT

## 2017-07-12 PROCEDURE — 82330 ASSAY OF CALCIUM: CPT

## 2017-07-12 PROCEDURE — 85730 THROMBOPLASTIN TIME PARTIAL: CPT

## 2017-07-12 PROCEDURE — 99285 EMERGENCY DEPT VISIT HI MDM: CPT | Mod: 25

## 2017-07-12 PROCEDURE — 83605 ASSAY OF LACTIC ACID: CPT

## 2017-07-12 PROCEDURE — 85610 PROTHROMBIN TIME: CPT

## 2017-07-12 RX ADMIN — NYSTATIN CREAM 1 APPLICATION(S): 100000 CREAM TOPICAL at 05:29

## 2017-07-12 RX ADMIN — NYSTATIN CREAM 1 APPLICATION(S): 100000 CREAM TOPICAL at 00:00

## 2017-07-12 RX ADMIN — Medication 650 MILLIGRAM(S): at 05:28

## 2017-07-12 RX ADMIN — CITALOPRAM 20 MILLIGRAM(S): 10 TABLET, FILM COATED ORAL at 11:44

## 2017-07-12 RX ADMIN — Medication 650 MILLIGRAM(S): at 00:30

## 2017-07-12 RX ADMIN — Medication 650 MILLIGRAM(S): at 00:00

## 2017-07-12 RX ADMIN — LISINOPRIL 40 MILLIGRAM(S): 2.5 TABLET ORAL at 05:27

## 2017-07-12 RX ADMIN — LEVETIRACETAM 400 MILLIGRAM(S): 250 TABLET, FILM COATED ORAL at 05:34

## 2017-07-12 RX ADMIN — PANTOPRAZOLE SODIUM 40 MILLIGRAM(S): 20 TABLET, DELAYED RELEASE ORAL at 05:31

## 2017-07-12 RX ADMIN — Medication 650 MILLIGRAM(S): at 05:50

## 2017-07-12 RX ADMIN — AMLODIPINE BESYLATE 10 MILLIGRAM(S): 2.5 TABLET ORAL at 05:27

## 2017-07-12 RX ADMIN — ENOXAPARIN SODIUM 40 MILLIGRAM(S): 100 INJECTION SUBCUTANEOUS at 11:44

## 2017-07-12 RX ADMIN — Medication 25 MILLIGRAM(S): at 05:27

## 2017-07-12 RX ADMIN — Medication 25 MICROGRAM(S): at 05:29

## 2017-07-12 NOTE — PROGRESS NOTE ADULT - ASSESSMENT
ASSESSMENT:   81 Y/O R handed woman with HTN presents with L hemiplegia, L facial droop and dysarthria. CT brain on admission and subsequent MRI brain showed right basal ganglial ICH and mild leukoaraiosis. MRA head and neck did not show any evidence of vascular malformation being the etiology of the ICH.     Impression:   Nontrauamtic R hemispheric subcortically located ICH - likely etiology being hypertensive ICH      NEURO: Neuro exam overall without acute change, Episode of facial twitching on 06/29,  EEG with intermittent polymorphic slowing right hemisphere and right PLEDS, on keppra 500mg BID, plan to repeat EEG in 3 months to eval for PLEDS; at that time, if she has had no further clinical seizures, and if PLEDS have resolved, may consider stopping Keppra. stable cerebral edema with mass effect and brain compression, titrate to normotension, hx of ETOH abuse, psych consult appreciated, Physical therapy/OT recommend AR, continue current rehab treatment  ANTITHROMBOTIC THERAPY: None in setting of IPH and no specific indications at this time     PULMONARY: CXR clear 6/30, protecting airway, saturating well     CARDIOVASCULAR:  continue current cardiac regimen                      GASTROINTESTINAL:   S/p PEG placement (07/05) tolerating  feedings, IR team consult appreciated, PPI for GERD, repeat SLP evaluation at rehab.     Diet: Jevity PEG TF    RENAL: Good urine output     Na Goal: Greater than 135     Basurto: N    HEMATOLOGY: No signs/symptoms of  active bleeding     DVT ppx: LMWH     ID:  Afebrile, no signs/symptoms of infection    OTHER: Plan of care d/w patient at bedside.     DISPOSITION: AR as per PMR eval as bed/authorization is available    CORE MEASURES:        Admission NIHSS: 11     TPA:  NO      LDL/HDL: 86/71     Depression Screen:  0     Statin Therapy: None due to IPH     Dysphagia Screen:  FAIL     SmokingNO      Afib  NO     [employer]  YES ASSESSMENT:   83 Y/O R handed woman with HTN presents with L hemiplegia, L facial droop and dysarthria. CT brain on admission and subsequent MRI brain showed right basal ganglial ICH and mild leukoaraiosis. MRA head and neck did not show any evidence of vascular malformation being the etiology of the ICH.     Impression:   Nontrauamtic R hemispheric subcortically located ICH - likely etiology being hypertensive ICH      NEURO: Neuro exam overall without acute change, Episode of facial twitching on 06/29,  EEG with intermittent polymorphic slowing right hemisphere and right PLEDS, on keppra 500mg BID, plan to repeat EEG in 3 months to eval for PLEDS at that time and also to determine the duration of AEDs, if she has had no further clinical seizures, and if PLEDS have resolved, may consider stopping Keppra. stable cerebral edema with mass effect and brain compression, titrate to normotension, hx of ETOH abuse, psych consult appreciated, Physical therapy/OT recommend AR, continue current rehab treatment  ANTITHROMBOTIC THERAPY: None in setting of IPH and no specific indications at this time     PULMONARY: CXR clear 6/30, protecting airway, saturating well     CARDIOVASCULAR:  Continue current cardiac regimen                      GASTROINTESTINAL:   S/p PEG placement (07/05) tolerating  feedings, IR team consult appreciated, PPI for GERD, repeat SLP evaluation at rehab.     Diet: Jevity PEG TF    RENAL: Good urine output     Na Goal: Greater than 135     Basurto: N    HEMATOLOGY: No signs/symptoms of  active bleeding     DVT ppx: LMWH     ID:  Afebrile, no signs/symptoms of infection    OTHER: Plan of care d/w patient at bedside.     DISPOSITION: AR as per PMR eval as bed/authorization is available    CORE MEASURES:        Admission NIHSS: 11     TPA:  NO      LDL/HDL: 86/71     Depression Screen:  0     Statin Therapy: None due to IPH     Dysphagia Screen:  FAIL     SmokingNO      Afib  NO     [employer]  YES

## 2017-07-12 NOTE — PROGRESS NOTE ADULT - SUBJECTIVE AND OBJECTIVE BOX
CARDIOLOGY FOLLOW UP     CC no chest pain or sob       PHYSICAL EXAM:  T(C): 36.4 (07-12-17 @ 07:55), Max: 36.7 (07-11-17 @ 19:37)  HR: 61 (07-12-17 @ 07:55) (57 - 72)  BP: 111/59 (07-12-17 @ 07:55) (111/59 - 150/80)  RR: 20 (07-12-17 @ 07:55) (18 - 22)  SpO2: 98% (07-12-17 @ 07:55) (93% - 98%)  Wt(kg): --  I&O's Summary    11 Jul 2017 07:01  -  12 Jul 2017 07:00  --------------------------------------------------------  IN: 900 mL / OUT: 0 mL / NET: 900 mL        Appearance: Normal	  Cardiovascular: Normal S1 S2,RRR, No JVD, No murmurs  Respiratory: Lungs clear to auscultation	  Gastrointestinal:  Soft, Non-tender, + BS	+ peg   Extremities: Normal range of motion, No clubbing, cyanosis or edema        MEDICATIONS  (STANDING):  nystatin Cream 1 Application(s) Topical every 8 hours  lisinopril 40 milliGRAM(s) Oral daily  levothyroxine Injectable 25 MICROGram(s) IV Push daily  enoxaparin Injectable 40 milliGRAM(s) SubCutaneous daily  metoprolol 25 milliGRAM(s) Oral two times a day  levETIRAcetam  IVPB 500 milliGRAM(s) IV Intermittent every 12 hours  QUEtiapine 25 milliGRAM(s) Oral at bedtime  amLODIPine   Tablet 10 milliGRAM(s) Oral daily  citalopram 20 milliGRAM(s) Oral daily  pantoprazole   Suspension 40 milliGRAM(s) Oral before breakfast      TELEMETRY: NSR 	    	    LABS:

## 2017-07-12 NOTE — PROGRESS NOTE ADULT - SUBJECTIVE AND OBJECTIVE BOX
THE PATIENT WAS SEEN AND EXAMINED BY ME WITH THE HOUSESTAFF AND STROKE TEAM DURING MORNING ROUNDS.   HPI:    82y Y/O RH woman with PMH HTN, GERD, ETOH abuse and on ASA who presented with a chief complaint of left sided weakness. No tPA given due to IPH on CTH.  At baseline walks with cane and has aide at home to assist with ADLs.  Admitted to stroke service for further workup and evaluation    SUBJECTIVE: No events overnight.  No new neurologic complaints.      nystatin Cream 1 Application(s) Topical every 8 hours  ondansetron    Tablet 4 milliGRAM(s) Oral every 6 hours PRN  lisinopril 40 milliGRAM(s) Oral daily  levothyroxine Injectable 25 MICROGram(s) IV Push daily  enoxaparin Injectable 40 milliGRAM(s) SubCutaneous daily  acetaminophen    Suspension. 650 milliGRAM(s) Oral every 6 hours PRN  metoprolol 25 milliGRAM(s) Oral two times a day  levETIRAcetam  IVPB 500 milliGRAM(s) IV Intermittent every 12 hours  QUEtiapine 25 milliGRAM(s) Oral at bedtime  amLODIPine   Tablet 10 milliGRAM(s) Oral daily  citalopram 20 milliGRAM(s) Oral daily  pantoprazole   Suspension 40 milliGRAM(s) Oral before breakfast      PHYSICAL EXAM:   Vital Signs Last 24 Hrs  T(C): 36.4 (12 Jul 2017 07:55), Max: 36.7 (11 Jul 2017 19:37)  T(F): 97.5 (12 Jul 2017 07:55), Max: 98.1 (11 Jul 2017 23:21)  HR: 61 (12 Jul 2017 07:55) (57 - 72)  BP: 111/59 (12 Jul 2017 07:55) (111/59 - 150/80)  BP(mean): --  RR: 20 (12 Jul 2017 07:55) (18 - 20)  SpO2: 98% (12 Jul 2017 07:55) (93% - 98%)    General: No acute distress  HEENT: EOM intact, visual fields full  Abdomen: Soft, nontender, nondistended   Extremities: No edema    NEUROLOGICAL EXAM:  Mental status: eyes open, alert, oriented X3, fluent speech, no neglect, perseverates at times, able to follow commands  Cranial Nerves: Subtle left facial palsy, no nystagmus, moderate dysarthria, dysphagia  Motor exam: RUE 5/5,  RLE 5/5, Left hemiparesis with some effort against gravity LUE proximally 2/5 with repetitive prompting spastic , wrist 2/5,  and LLE dorsiflexion trace of movement  Sensation: Diminished on left side  Coordination/ Gait: No dysmetria, gait not assessed    LABS:         Hemoglobin A1C, Whole Blood: 5.5 % (07-03 @ 12:08)      IMAGING: Reviewed by me.   CT Head No Cont/ CERVICAL SPINE CT Head No Cont (06.26.1) Noncontrast CT brain: Acute right intraparenchymal hemorrhage  Acute intraparenchymal hemorrhage just lateral to the right thalamic body which measures 2.7 x 1.5 x 1.9 cm (AP x TV x CC). There is no midline shift or central herniation. CT cervicalspine:  No evidence for acute displaced fracture or traumatic malalignment. Cervical degenerative spondylosis  CT Head No Cont (06.27.17) >Grossly stable 2.9 x 1.9 cm parenchymal hemorrhage in the region of the posterior right lentiform nucleus.  No midline shift or hydrocephalus. MRI/MRA Head and neck w/o Cont (06.28.17) Right thalamocapsular region hemorrhage as seen on the prior CT. No abnormal enhancement to suggest an underlying lesion.  Suspicion of a moderate stenosis approximately 2 cm distal to the origin of the right internal carotid artery. Atherosclerotic irregularity at the level of the left internal carotid artery is suggested. CT Abdomen and Pelvis No Cont (07.02) >Cholelithiasis.Diverticulosis. Age-indeterminate compression deformity of L1. Chronic compression deformity of T11.  A 1.8 cm low-attenuation right anterior lower pelvic subcutaneous nodule may represent a sebaceous cyst THE PATIENT WAS SEEN AND EXAMINED BY ME WITH THE HOUSESTAFF AND STROKE TEAM DURING MORNING ROUNDS.     HPI:  82y Y/O RH woman with PMH HTN, GERD, ETOH abuse and on ASA who presented with a chief complaint of left sided weakness. No tPA given due to IPH on CTH.  At baseline walks with cane and has aide at home to assist with ADLs.  Admitted to stroke service for further workup and evaluation    SUBJECTIVE: No events overnight.  No new neurologic complaints.      nystatin Cream 1 Application(s) Topical every 8 hours  ondansetron    Tablet 4 milliGRAM(s) Oral every 6 hours PRN  lisinopril 40 milliGRAM(s) Oral daily  levothyroxine Injectable 25 MICROGram(s) IV Push daily  enoxaparin Injectable 40 milliGRAM(s) SubCutaneous daily  acetaminophen    Suspension. 650 milliGRAM(s) Oral every 6 hours PRN  metoprolol 25 milliGRAM(s) Oral two times a day  levETIRAcetam  IVPB 500 milliGRAM(s) IV Intermittent every 12 hours  QUEtiapine 25 milliGRAM(s) Oral at bedtime  amLODIPine   Tablet 10 milliGRAM(s) Oral daily  citalopram 20 milliGRAM(s) Oral daily  pantoprazole   Suspension 40 milliGRAM(s) Oral before breakfast    PHYSICAL EXAM:   Vital Signs Last 24 Hrs  T(C): 36.4 (12 Jul 2017 07:55), Max: 36.7 (11 Jul 2017 19:37)  T(F): 97.5 (12 Jul 2017 07:55), Max: 98.1 (11 Jul 2017 23:21)  HR: 61 (12 Jul 2017 07:55) (57 - 72)  BP: 111/59 (12 Jul 2017 07:55) (111/59 - 150/80)  BP(mean): --  RR: 20 (12 Jul 2017 07:55) (18 - 20)  SpO2: 98% (12 Jul 2017 07:55) (93% - 98%)    General: No acute distress  HEENT: EOM intact, visual fields full  Abdomen: Soft, nontender, nondistended   Extremities: No edema    NEUROLOGICAL EXAM:  Mental status: eyes open, alert, oriented X3, fluent speech, no neglect, perseverates at times, able to follow commands  Cranial Nerves: Subtle left facial palsy, no nystagmus, moderate dysarthria, dysphagia  Motor exam: RUE 5/5,  RLE 5/5, Left hemiparesis with some effort against gravity LUE proximally 2/5 with repetitive prompting spastic , wrist 2/5,  and LLE dorsiflexion trace of movement  Sensation: Diminished on left side  Coordination/ Gait: No dysmetria, gait not assessed    LABS:  Hemoglobin A1C, Whole Blood: 5.5 % (07-03 @ 12:08)    IMAGING: Reviewed by me.   CT Head No Cont/ CERVICAL SPINE CT Head No Cont (06.26.1) Noncontrast CT brain: Acute right intraparenchymal hemorrhage  Acute intraparenchymal hemorrhage just lateral to the right thalamic body which measures 2.7 x 1.5 x 1.9 cm (AP x TV x CC). There is no midline shift or central herniation. CT cervicalspine:  No evidence for acute displaced fracture or traumatic malalignment. Cervical degenerative spondylosis  CT Head No Cont (06.27.17) >Grossly stable 2.9 x 1.9 cm parenchymal hemorrhage in the region of the posterior right lentiform nucleus.  No midline shift or hydrocephalus. MRI/MRA Head and neck w/o Cont (06.28.17) Right thalamocapsular region hemorrhage as seen on the prior CT. No abnormal enhancement to suggest an underlying lesion.  Suspicion of a moderate stenosis approximately 2 cm distal to the origin of the right internal carotid artery. Atherosclerotic irregularity at the level of the left internal carotid artery is suggested. CT Abdomen and Pelvis No Cont (07.02) >Cholelithiasis.Diverticulosis. Age-indeterminate compression deformity of L1. Chronic compression deformity of T11.  A 1.8 cm low-attenuation right anterior lower pelvic subcutaneous nodule may represent a sebaceous cyst

## 2017-07-12 NOTE — PROGRESS NOTE ADULT - ATTENDING COMMENTS
Agree with above NP note.    cv stable   bp optimal   s/p peg  care per neuro
Agree with above NP note.    cv stable   care per neuro   cont current tx
Agree with above NP note.    cv stable   no af  echo with normal lc fxn  bp tx  neuro f/u
Agree with above NP note.    cv stable   no af  echo with normal lv fxn  bp tx  neuro f/u
Agree with above NP note.    cv stable   no af  echo with normal lv fxn  bp tx  neuro f/u
Agree with above NP note.    cv stable  bp parameters per neuro  egd noted  await peg
Patient seen and examined, agree with the above assessment and plan by DANILO Yarbrough.  CV status remains stable  Pt optimized for PEG  Cont current medications
Patient  was seen and examined by me  with PA, RN and House staff.  Exam and Plan are as outlined above.

## 2017-08-23 ENCOUNTER — OUTPATIENT (OUTPATIENT)
Dept: OUTPATIENT SERVICES | Facility: HOSPITAL | Age: 82
LOS: 1 days | End: 2017-08-23
Payer: COMMERCIAL

## 2017-08-23 ENCOUNTER — APPOINTMENT (OUTPATIENT)
Dept: MRI IMAGING | Facility: IMAGING CENTER | Age: 82
End: 2017-08-23
Payer: MEDICARE

## 2017-08-23 DIAGNOSIS — Z00.8 ENCOUNTER FOR OTHER GENERAL EXAMINATION: ICD-10-CM

## 2017-08-23 PROCEDURE — 73721 MRI JNT OF LWR EXTRE W/O DYE: CPT | Mod: 26,LT

## 2017-08-23 PROCEDURE — 73721 MRI JNT OF LWR EXTRE W/O DYE: CPT

## 2017-09-11 ENCOUNTER — INPATIENT (INPATIENT)
Facility: HOSPITAL | Age: 82
LOS: 1 days | Discharge: NURSING, REHAB OR LONG-TERM CARE | DRG: 57 | End: 2017-09-13
Attending: PSYCHIATRY & NEUROLOGY | Admitting: PSYCHIATRY & NEUROLOGY
Payer: COMMERCIAL

## 2017-09-11 VITALS
SYSTOLIC BLOOD PRESSURE: 130 MMHG | OXYGEN SATURATION: 99 % | HEART RATE: 90 BPM | DIASTOLIC BLOOD PRESSURE: 88 MMHG | RESPIRATION RATE: 18 BRPM

## 2017-09-11 DIAGNOSIS — R41.82 ALTERED MENTAL STATUS, UNSPECIFIED: ICD-10-CM

## 2017-09-11 LAB
ALBUMIN SERPL ELPH-MCNC: 4 G/DL — SIGNIFICANT CHANGE UP (ref 3.3–5)
ALP SERPL-CCNC: 92 U/L — SIGNIFICANT CHANGE UP (ref 40–120)
ALT FLD-CCNC: 21 U/L RC — SIGNIFICANT CHANGE UP (ref 10–45)
ANION GAP SERPL CALC-SCNC: 15 MMOL/L — SIGNIFICANT CHANGE UP (ref 5–17)
ANION GAP SERPL CALC-SCNC: 17 MMOL/L — SIGNIFICANT CHANGE UP (ref 5–17)
APPEARANCE UR: CLEAR — SIGNIFICANT CHANGE UP
APTT BLD: 32.9 SEC — SIGNIFICANT CHANGE UP (ref 27.5–37.4)
AST SERPL-CCNC: 30 U/L — SIGNIFICANT CHANGE UP (ref 10–40)
BASE EXCESS BLDV CALC-SCNC: 0.4 MMOL/L — SIGNIFICANT CHANGE UP (ref -2–2)
BASE EXCESS BLDV CALC-SCNC: 3.1 MMOL/L — HIGH (ref -2–2)
BASOPHILS # BLD AUTO: 0 K/UL — SIGNIFICANT CHANGE UP (ref 0–0.2)
BASOPHILS NFR BLD AUTO: 0.3 % — SIGNIFICANT CHANGE UP (ref 0–2)
BILIRUB SERPL-MCNC: 0.3 MG/DL — SIGNIFICANT CHANGE UP (ref 0.2–1.2)
BILIRUB UR-MCNC: NEGATIVE — SIGNIFICANT CHANGE UP
BUN SERPL-MCNC: 35 MG/DL — HIGH (ref 7–23)
BUN SERPL-MCNC: 43 MG/DL — HIGH (ref 7–23)
CA-I SERPL-SCNC: 1.22 MMOL/L — SIGNIFICANT CHANGE UP (ref 1.12–1.3)
CA-I SERPL-SCNC: 1.23 MMOL/L — SIGNIFICANT CHANGE UP (ref 1.12–1.3)
CALCIUM SERPL-MCNC: 9.8 MG/DL — SIGNIFICANT CHANGE UP (ref 8.4–10.5)
CALCIUM SERPL-MCNC: 9.9 MG/DL — SIGNIFICANT CHANGE UP (ref 8.4–10.5)
CHLORIDE BLDV-SCNC: 100 MMOL/L — SIGNIFICANT CHANGE UP (ref 96–108)
CHLORIDE BLDV-SCNC: 102 MMOL/L — SIGNIFICANT CHANGE UP (ref 96–108)
CHLORIDE SERPL-SCNC: 95 MMOL/L — LOW (ref 96–108)
CHLORIDE SERPL-SCNC: 98 MMOL/L — SIGNIFICANT CHANGE UP (ref 96–108)
CO2 BLDV-SCNC: 26 MMOL/L — SIGNIFICANT CHANGE UP (ref 22–30)
CO2 BLDV-SCNC: 31 MMOL/L — HIGH (ref 22–30)
CO2 SERPL-SCNC: 22 MMOL/L — SIGNIFICANT CHANGE UP (ref 22–31)
CO2 SERPL-SCNC: 27 MMOL/L — SIGNIFICANT CHANGE UP (ref 22–31)
COLOR SPEC: SIGNIFICANT CHANGE UP
CREAT SERPL-MCNC: 0.78 MG/DL — SIGNIFICANT CHANGE UP (ref 0.5–1.3)
CREAT SERPL-MCNC: 0.88 MG/DL — SIGNIFICANT CHANGE UP (ref 0.5–1.3)
DIFF PNL FLD: NEGATIVE — SIGNIFICANT CHANGE UP
EOSINOPHIL # BLD AUTO: 0 K/UL — SIGNIFICANT CHANGE UP (ref 0–0.5)
EOSINOPHIL NFR BLD AUTO: 0.4 % — SIGNIFICANT CHANGE UP (ref 0–6)
EPI CELLS # UR: SIGNIFICANT CHANGE UP /HPF
GAS PNL BLDV: 133 MMOL/L — LOW (ref 136–145)
GAS PNL BLDV: 136 MMOL/L — SIGNIFICANT CHANGE UP (ref 136–145)
GAS PNL BLDV: SIGNIFICANT CHANGE UP
GLUCOSE BLDV-MCNC: 105 MG/DL — HIGH (ref 70–99)
GLUCOSE BLDV-MCNC: 124 MG/DL — HIGH (ref 70–99)
GLUCOSE SERPL-MCNC: 114 MG/DL — HIGH (ref 70–99)
GLUCOSE SERPL-MCNC: 121 MG/DL — HIGH (ref 70–99)
GLUCOSE UR QL: NEGATIVE — SIGNIFICANT CHANGE UP
HCO3 BLDV-SCNC: 25 MMOL/L — SIGNIFICANT CHANGE UP (ref 21–29)
HCO3 BLDV-SCNC: 29 MMOL/L — SIGNIFICANT CHANGE UP (ref 21–29)
HCT VFR BLD CALC: 34.3 % — LOW (ref 34.5–45)
HCT VFR BLDA CALC: 36 % — LOW (ref 39–50)
HCT VFR BLDA CALC: 36 % — LOW (ref 39–50)
HGB BLD CALC-MCNC: 11.6 G/DL — SIGNIFICANT CHANGE UP (ref 11.5–15.5)
HGB BLD CALC-MCNC: 11.6 G/DL — SIGNIFICANT CHANGE UP (ref 11.5–15.5)
HGB BLD-MCNC: 11.6 G/DL — SIGNIFICANT CHANGE UP (ref 11.5–15.5)
INR BLD: 1.05 RATIO — SIGNIFICANT CHANGE UP (ref 0.88–1.16)
KETONES UR-MCNC: NEGATIVE — SIGNIFICANT CHANGE UP
LACTATE BLDV-MCNC: 1.8 MMOL/L — SIGNIFICANT CHANGE UP (ref 0.7–2)
LACTATE BLDV-MCNC: 3.3 MMOL/L — HIGH (ref 0.7–2)
LEUKOCYTE ESTERASE UR-ACNC: NEGATIVE — SIGNIFICANT CHANGE UP
LYMPHOCYTES # BLD AUTO: 1.3 K/UL — SIGNIFICANT CHANGE UP (ref 1–3.3)
LYMPHOCYTES # BLD AUTO: 12.9 % — LOW (ref 13–44)
MCHC RBC-ENTMCNC: 33.9 GM/DL — SIGNIFICANT CHANGE UP (ref 32–36)
MCHC RBC-ENTMCNC: 35.2 PG — HIGH (ref 27–34)
MCV RBC AUTO: 104 FL — HIGH (ref 80–100)
MONOCYTES # BLD AUTO: 0.4 K/UL — SIGNIFICANT CHANGE UP (ref 0–0.9)
MONOCYTES NFR BLD AUTO: 3.6 % — SIGNIFICANT CHANGE UP (ref 2–14)
NEUTROPHILS # BLD AUTO: 8.6 K/UL — HIGH (ref 1.8–7.4)
NEUTROPHILS NFR BLD AUTO: 82.8 % — HIGH (ref 43–77)
NITRITE UR-MCNC: NEGATIVE — SIGNIFICANT CHANGE UP
OTHER CELLS CSF MANUAL: 4 ML/DL — LOW (ref 18–22)
OTHER CELLS CSF MANUAL: 9 ML/DL — LOW (ref 18–22)
PCO2 BLDV: 43 MMHG — SIGNIFICANT CHANGE UP (ref 35–50)
PCO2 BLDV: 56 MMHG — HIGH (ref 35–50)
PH BLDV: 7.34 — LOW (ref 7.35–7.45)
PH BLDV: 7.38 — SIGNIFICANT CHANGE UP (ref 7.35–7.45)
PH UR: 8 — SIGNIFICANT CHANGE UP (ref 5–8)
PLATELET # BLD AUTO: 270 K/UL — SIGNIFICANT CHANGE UP (ref 150–400)
PO2 BLDV: 20 MMHG — LOW (ref 25–45)
PO2 BLDV: 31 MMHG — SIGNIFICANT CHANGE UP (ref 25–45)
POTASSIUM BLDV-SCNC: 4.8 MMOL/L — SIGNIFICANT CHANGE UP (ref 3.5–5)
POTASSIUM BLDV-SCNC: 5.3 MMOL/L — HIGH (ref 3.5–5)
POTASSIUM SERPL-MCNC: 4.7 MMOL/L — SIGNIFICANT CHANGE UP (ref 3.5–5.3)
POTASSIUM SERPL-MCNC: 5.6 MMOL/L — HIGH (ref 3.5–5.3)
POTASSIUM SERPL-SCNC: 4.7 MMOL/L — SIGNIFICANT CHANGE UP (ref 3.5–5.3)
POTASSIUM SERPL-SCNC: 5.6 MMOL/L — HIGH (ref 3.5–5.3)
PROT SERPL-MCNC: 8.2 G/DL — SIGNIFICANT CHANGE UP (ref 6–8.3)
PROT UR-MCNC: NEGATIVE — SIGNIFICANT CHANGE UP
PROTHROM AB SERPL-ACNC: 11.4 SEC — SIGNIFICANT CHANGE UP (ref 9.8–12.7)
RBC # BLD: 3.3 M/UL — LOW (ref 3.8–5.2)
RBC # FLD: 11.9 % — SIGNIFICANT CHANGE UP (ref 10.3–14.5)
RBC CASTS # UR COMP ASSIST: SIGNIFICANT CHANGE UP /HPF (ref 0–2)
SAO2 % BLDV: 26 % — LOW (ref 67–88)
SAO2 % BLDV: 54 % — LOW (ref 67–88)
SODIUM SERPL-SCNC: 137 MMOL/L — SIGNIFICANT CHANGE UP (ref 135–145)
SODIUM SERPL-SCNC: 137 MMOL/L — SIGNIFICANT CHANGE UP (ref 135–145)
SP GR SPEC: 1.01 — LOW (ref 1.01–1.02)
UROBILINOGEN FLD QL: NEGATIVE — SIGNIFICANT CHANGE UP
WBC # BLD: 10.4 K/UL — SIGNIFICANT CHANGE UP (ref 3.8–10.5)
WBC # FLD AUTO: 10.4 K/UL — SIGNIFICANT CHANGE UP (ref 3.8–10.5)
WBC UR QL: SIGNIFICANT CHANGE UP /HPF (ref 0–5)

## 2017-09-11 PROCEDURE — 71010: CPT | Mod: 26

## 2017-09-11 PROCEDURE — 70450 CT HEAD/BRAIN W/O DYE: CPT | Mod: 26

## 2017-09-11 PROCEDURE — 93010 ELECTROCARDIOGRAM REPORT: CPT

## 2017-09-11 PROCEDURE — 99285 EMERGENCY DEPT VISIT HI MDM: CPT | Mod: 25

## 2017-09-11 RX ORDER — SODIUM CHLORIDE 9 MG/ML
1000 INJECTION INTRAMUSCULAR; INTRAVENOUS; SUBCUTANEOUS
Qty: 0 | Refills: 0 | Status: DISCONTINUED | OUTPATIENT
Start: 2017-09-11 | End: 2017-09-12

## 2017-09-11 RX ORDER — SODIUM CHLORIDE 9 MG/ML
1000 INJECTION INTRAMUSCULAR; INTRAVENOUS; SUBCUTANEOUS ONCE
Qty: 0 | Refills: 0 | Status: DISCONTINUED | OUTPATIENT
Start: 2017-09-11 | End: 2017-09-11

## 2017-09-11 RX ORDER — SODIUM CHLORIDE 9 MG/ML
1000 INJECTION INTRAMUSCULAR; INTRAVENOUS; SUBCUTANEOUS ONCE
Qty: 0 | Refills: 0 | Status: COMPLETED | OUTPATIENT
Start: 2017-09-11 | End: 2017-09-11

## 2017-09-11 RX ADMIN — SODIUM CHLORIDE 80 MILLILITER(S): 9 INJECTION INTRAMUSCULAR; INTRAVENOUS; SUBCUTANEOUS at 23:01

## 2017-09-11 RX ADMIN — SODIUM CHLORIDE 1000 MILLILITER(S): 9 INJECTION INTRAMUSCULAR; INTRAVENOUS; SUBCUTANEOUS at 20:30

## 2017-09-11 NOTE — ED PROVIDER NOTE - OBJECTIVE STATEMENT
83 y/o female w/ PMH of HTN, GERD, ETOH abuse and on ASA with recent left ICH and discharged to rehab facility 81 y/o female w/ PMH of HTN, GERD, ETOH abuse and on ASA with recent left ICH s/p PEG and NPO and discharged from [facility] for AMS for past 24 hours. limited hx, non verbal ?baseline. 81 y/o female w/ PMH of HTN, GERD, ETOH abuse and on NOT on AC with recent left ICH s/p PEG and NPO and discharged from [facility] for AMS for past 24 hours. limited hx, non verbal ?baseline.

## 2017-09-11 NOTE — H&P ADULT - HISTORY OF PRESENT ILLNESS
Pt is an 82y Y/O RH female with PMH HTN, GERD, ETOH abuse and on ASA who presented with a chief complaint of AMS. She was recently discharged on July 12 s/p intraparenchymal hemorrhage for which she sustained residual neurological deficits requiring PEG tube and rehab placement No tPA was given due to IPH on CTH. She returns to [facility] due to AMS which began last night. The nurse taking care of her at [facility] (contacted by me over the phone) reports that pt has been more and more lethargic and not herself. While there she is able to carry a conversation and interact with the staff however  the pt is non-responsive and there is a clear acute change in AMS. She was afebrile no recent falls or urination changes. There was no reported seizure activity (such as convulsion, tongue biting, eye rolling). Pt is an 82y Y/O RH female with PMH HTN, GERD, ETOH abuse and on ASA who presented with a chief complaint of AMS. She was recently discharged on July 12 s/p intraparenchymal hemorrhage for which she sustained residual neurological deficits requiring PEG tube and rehab placement No tPA was given due to IPH on CTH. She returns to [facility] due to AMS which began last night. The nurse taking care of her at [facility] (contacted by me over the phone) reports that pt has been more and more lethargic and not herself. While there she is able to carry a conversation and interact with the staff however  the pt is non-responsive and there is a clear acute change in AMS. She was afebrile no recent falls or urination changes. There was no reported seizure activity (such as convulsion, tongue biting, eye rolling).     Medications per NH chart:  Tramadol 50mg Q4H PRN   Lisinopril 20mg Daily via Peg  Tylenol 325 PRN via PEG  Lidocaine topical patch 4% at bedtime, apply once daily to left knee   Vitamin A and D ointment right heel   Metoprolol tartrate 25mg Q12H via PEG  Citalopram 20mg daily via PEG  First Omepra Yajaira 2mg/ml daily via PEG  Lovenox 40mgSubq daily  Keppra 500mg Q12H via PEG  Amlodipine 10mg Daily  Allopurinol 200mg daily in the Am  Levothyroxine 50mcg before breakfast   Quetiapine 25mg Qhs Pt is an 82y Y/O RH female with PMH HTN, GERD, ETOH abuse and on ASA who presented with a chief complaint of AMS. She was recently discharged on July 12 s/p intraparenchymal hemorrhage for which she sustained residual neurological deficits requiring PEG tube and rehab placement. No tPA was given due to bleed. She returns to [facility] due to AMS which began last night. The nurse taking care of her at [facility] (contacted by me over the phone) reports that pt has been more and more lethargic and not herself. While there she usually is able to carry a conversation, makes jokes and interacts with the staff however as of last night the pt is non-responsive, goes in and out of alertness. There is a clear acute change in AMS. She remained afebrile, no recent falls or urinary changes. There was no reported symptomatic seizure activity (such as convulsion, tongue biting, eye rolling).    Medications per NH chart:  Tramadol 50mg Q4H PRN   Lisinopril 20mg Daily via Peg  Tylenol 325 PRN via PEG  Lidocaine topical patch 4% at bedtime, apply once daily to left knee   Vitamin A and D ointment right heel   Metoprolol tartrate 25mg Q12H via PEG  Citalopram 20mg daily via PEG  First Omepra Yajaira 2mg/ml daily via PEG  Lovenox 40mgSubq daily  Keppra 500mg Q12H via PEG  Amlodipine 10mg Daily  Allopurinol 200mg daily in the Am  Levothyroxine 50mcg before breakfast   Quetiapine 25mg Qhs

## 2017-09-11 NOTE — H&P ADULT - ASSESSMENT
Pt is an 82y Y/O RH female with PMH HTN, GERD, ETOH abuse and on ASA who presented with a chief complaint of AMS. She was recently  discharged on July 12 s/p nontrauamtic right hemispheric subcortically located ICH for which she has residual neurolgoical deicits requiring PEG  tube and rehab placement. She has residual left sided deficits. She had a full stroke workup and was DC'ed on ASA and statin. Pt was also  having facial twitching for which EEG was completed and showed intermittent polymorphic slowing right hemisphere and right PLEDS. Therefore  she was also started on Keppra 500mg BID. Today she presents due to an acute change in mental status. She is muttering sounds and words,  no clear or coherent speech, receptively aphasic, does not follow any commands. Exam in very limited however is moving her right side. Labs do  not show any electrolyte derangements (repeat vbg showed normal K level), urinalysis was clean and chest xray did not show any opacities to  suggest infectious source for AMS. Repeat CT head showed no acute findings suggestive of acute CVA (symptoms began last night). 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 Other differentials include  viral infection (not apparent on labs and imaging) that could cause an acute change in mental status, commonly seen in the elderly, however pt  remains afebrile wbc count is normal. DDx - non-convulsive seizure 2/2 structural abnormality due to IPH.    Plan  Admit to the Neuro Floor service  Will continue home medications  Seizure precautions  Telemetry monitoring  Vitals signs Q4H  Neurochecks Q4H  Fall precautions Pt is an 82y Y/O RH female with PMH HTN, GERD, ETOH abuse and on ASA who presented with a chief complaint of AMS. She was recently  discharged on July 12 s/p nontrauamtic right hemispheric subcortically located ICH for which she has residual neurolgoical deicits requiring PEG  tube and rehab placement. She has residual left sided deficits. She had a full stroke workup and was DC'ed on ASA and statin. Pt was also  having facial twitching for which EEG was completed and showed intermittent polymorphic slowing right hemisphere and right PLEDS. Therefore  she was also started on Keppra 500mg BID. Today she presents due to an acute change in mental status. She is muttering sounds and words,  no clear or coherent speech, receptively aphasic, does not follow any commands. Exam in very limited however is moving her right side. Labs do  not show any electrolyte derangements (repeat vbg showed normal K level), urinalysis was clean and chest xray did not show any opacities to  suggest infectious source for AMS. Repeat CT head showed no acute findings suggestive of acute CVA (symptoms began last night). 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 Other differentials include  viral infection (not apparent on labs and imaging) that could cause an acute change in mental status, commonly seen in the elderly, however pt  remains afebrile wbc count is normal. DDx - non-convulsive seizure 2/2 structural abnormality due to IPH.    Plan  Admit to the Neuro Floor service  VEEG monitoring for 24 hours  Will continue home medications  Seizure precautions  Telemetry monitoring  Vitals signs Q4H  Neuro-checks Q4H  Fall precautions Pt is an 82y Y/O RH female with PMH HTN, GERD, ETOH abuse and on ASA who presented with a chief complaint of AMS. She was recently  discharged on July 12 s/p nontrauamtic right hemispheric subcortically located ICH for which she has residual neurolgoical deicits requiring PEG  tube and rehab placement. She has residual left sided deficits. She had a full stroke workup and was DC'ed on ASA and statin. Pt was also  having facial twitching for which EEG was completed and showed intermittent polymorphic slowing right hemisphere and right PLEDS. Therefore  she was also started on Keppra 500mg BID. Today she presents due to an acute change in mental status. She is muttering sounds and words,  no clear or coherent speech, receptively aphasic, does not follow any commands. Exam in very limited however is moving her right side. Labs do  not show any electrolyte derangements (repeat vbg showed normal K level), urinalysis was clean and chest xray did not show any opacities to  suggest infectious source for AMS. Repeat CT head showed no acute findings suggestive of acute CVA (symptoms began last night). 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 Other differentials include  viral infection (not apparent on labs and imaging) that could cause an acute change in mental status, commonly seen in the elderly, however pt  remains afebrile wbc count is normal. DDx - non-convulsive seizure 2/2 structural abnormality due to IPH.    Plan  Admit to the Neuro Floor service  VEEG monitoring for 24 hours  Will continue home medications - including Keppra 500mg BID (will not load to 1g BID as pt has returned to baseline and vEEG monitoring to be initiated)   Seizure precautions  Telemetry monitoring  Vitals signs Q4H  Neuro-checks Q4H  Fall precautions   Continue with speech therapy, physical therapy and OT Pt is an 82y Y/O RH female with PMH HTN, GERD, ETOH abuse and on ASA who presented with a chief complaint of AMS. She was recently discharged on July 12 s/p nontraumatic right hemispheric subcortically located ICH for which she has residual neurological deficits requiring PEG tube and rehab placement. She has residual left sided deficits. She had a full stroke workup and was DC'ed on ASA and statin. Pt was also having facial twitching for which EEG was completed and showed intermittent polymorphic slowing right hemisphere and right PLEDS. Thereforeshe was also started on Keppra 500mg BID. Today she presents due to an acute change in mental status. She is muttering sounds and words,no clear or coherent speech, receptively aphasic, does not follow any commands. Exam was very limited however is moving her right side. Labs do not show any electrolyte derangements (repeat vbg showed normal K level), urinalysis was clean and chest x-ray did not show any opacities to suggest infectious source for AMS. Repeat CT head showed no acute findings suggestive of acute CVA (symptoms began last night). 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 Other differentials include viral infection (not apparent on labs and imaging) that could cause an acute change in mental status, commonly seen in the elderly, however pt remains afebrile wbc count is normal. DDx - non-convulsive seizure 2/2 structural abnormality due to IPH.    Interval history - Follow up physical exam at 12am. Pt was alert awake and oriented to person and place. Speech was fluent and she follows commands, crosses midlines. Still has speech finding difficulties, naming deficits. Pt likely is at baseline (12am).     Plan  Admit to the Neuro Floor service  VEEG monitoring for 24 hours  Will continue home medications - including Keppra 500mg BID (will not load to 1g BID as pt has returned to baseline and vEEG monitoring to be initiated)   Seizure precautions  Telemetry monitoring  Vitals signs Q4H  Neuro-checks Q4H  Fall precautions   Continue with speech therapy, physical therapy and OT

## 2017-09-11 NOTE — H&P ADULT - NSHPPHYSICALEXAM_GEN_ALL_CORE
Physical Exam:  General - elderly female lying in ed stretcher with eyes closed, not following verbal commands or opening eyes  Neurological   MS - Awake but not alert, orientation unable to be ascertained  CN - PERRL, pt mutter sounds and words, no clear or coherent speech, receptively aphasic, does not follow any commands  Motor - moves right side with prompting, left side weak (from previous stroke)  Sensory - responds to stimuli however exam is very limited  Coordination, gait - not assessed  Reflexes - hyperflexia on the left side    Physical exam on July 11th:  Mental status: eyes open, alert, oriented X3, fluent speech, no neglect, perseverates at times, able to follow commands  Cranial Nerves: Subtle left facial palsy, no nystagmus, moderate dysarthria, dysphagia  Motor exam: RUE 5/5,  RLE 5/5, Left hemiparesis with some effort against gravity LUE proximally 2/5 with repetitive prompting spastic , wrist 2/5,  and LLE dorsiflexion trace of movement  Sensation: Diminished on left side  Coordination/ Gait: No dysmetria, gait not assessed Physical Exam: (please see interval history below also)   General - elderly female lying in ed stretcher with eyes closed, not following verbal commands or opening eyes  Neurological   MS - Awake but not alert, orientation unable to be ascertained   CN - PERRL, pt mutter sounds and words, no clear or coherent speech, receptively aphasic, does not follow any commands  Motor - moves right side with prompting, left side weak (from previous stroke)  Sensory - responds to stimuli however exam is very limited  Coordination, gait - not assessed  Reflexes - hyperflexia on the left side    Interval history - Follow up physical exam at 12am. Pt was alert awake and oriented to person and place. Speech was fluent and she follows commands, crosses midlines. Still has speech finding difficulties, naming deficits. Pt likely is at baseline (12am).     Physical exam on July 11th:  Mental status: eyes open, alert, oriented X3, fluent speech, no neglect, perseverates at times, able to follow commands  Cranial Nerves: Subtle left facial palsy, no nystagmus, moderate dysarthria, dysphagia  Motor exam: RUE 5/5,  RLE 5/5, Left hemiparesis with some effort against gravity LUE proximally 2/5 with repetitive prompting spastic , wrist 2/5,  and LLE dorsiflexion trace of movement  Sensation: Diminished on left side  Coordination/ Gait: No dysmetria, gait not assessed

## 2017-09-11 NOTE — ED PROVIDER NOTE - PHYSICAL EXAMINATION
limited exam due to uncooperative pt. not following commands. sitting in bed, splint on the L foot and L wrist. MONT[place] OCA, yelling to any movement. CTA chest, S1S2 no murmur. +peg in place. soft non tender.

## 2017-09-11 NOTE — H&P ADULT - NSHPLABSRESULTS_GEN_ALL_CORE
Labs:                        11.6   10.4  )-----------( 270      ( 11 Sep 2017 17:03 )             34.3   09-11    137  |  98  |  35<H>  ----------------------------<  121<H>  4.7   |  22  |  0.78    Ca    9.8      11 Sep 2017 22:17    TPro  8.2  /  Alb  4.0  /  TBili  0.3  /  DBili  x   /  AST  30  /  ALT  21  /  AlkPhos  92  09-11    Urinalysis - normal     06-29 - Cholesterol 182 / Tags 125 / HDL 71 / LDL 89       Imaging and other study modalities:  EEG during last admission - with intermittent polymorphic slowing right hemisphere and right PLEDS    CT head 9/11 - interval resolution of acute parenchymal hemorrhage in the   right basal ganglia region. Residual linear increased density in the   region of the right attainment may represent the presence of calcium and sequelae of prior hemorrhage. No new acute intracranial hemorrhage or evidence of acute territorial   infarct.    CT head - 6/27 - Grossly stable 2.9 x 1.9 cm parenchymal hemorrhage in the region of the posterior right lentiform nucleus.    MRI brain w/wo (6/28) - Right thalamocapsular region hemorrhage as seen on the prior CT. No abnormal enhancement to suggest an underlying lesion however recommend repeat study post resolution of the hemorrhage for better evaluation    MRA head and neck (6/28) - Suspicion of a moderate stenosis approximately 2 cm distal to the origin of the right internal carotid artery. Atherosclerotic irregularity at the level of the left internal carotid artery is suggested. Recommend correlation with other noninvasive vascular evaluation. Consider CTA and/or Doppler to better quantify the degree of stenosis . Labs:                        11.6   10.4  )-----------( 270      ( 11 Sep 2017 17:03 )             34.3   09-11    137  |  98  |  35<H>  ----------------------------<  121<H>  4.7   |  22  |  0.78    Ca    9.8      11 Sep 2017 22:17    TPro  8.2  /  Alb  4.0  /  TBili  0.3  /  DBili  x   /  AST  30  /  ALT  21  /  AlkPhos  92  09-11    Urinalysis - normal     06-29 - Cholesterol 182 / Tags 125 / HDL 71 / LDL 89     Imaging and other study modalities:  EEG during last admission - with intermittent polymorphic slowing right hemisphere and right PLEDS    CT head 9/11 - interval resolution of acute parenchymal hemorrhage in the   right basal ganglia region. Residual linear increased density in the   region of the right attainment may represent the presence of calcium and sequelae of prior hemorrhage. No new acute intracranial hemorrhage or evidence of acute territorial   infarct.    CT head - 6/27 - Grossly stable 2.9 x 1.9 cm parenchymal hemorrhage in the region of the posterior right lentiform nucleus.    MRI brain w/wo (6/28) - Right thalamocapsular region hemorrhage as seen on the prior CT. No abnormal enhancement to suggest an underlying lesion however recommend repeat study post resolution of the hemorrhage for better evaluation    MRA head and neck (6/28) - Suspicion of a moderate stenosis approximately 2 cm distal to the origin of the right internal carotid artery. Atherosclerotic irregularity at the level of the left internal carotid artery is suggested. Recommend correlation with other noninvasive vascular evaluation. Consider CTA and/or Doppler to better quantify the degree of stenosis .

## 2017-09-11 NOTE — ED PROVIDER NOTE - ATTENDING CONTRIBUTION TO CARE
81 y/o female w/ PMH of HTN, GERD, ETOH abuse and on NOT on AC with recent left ICH s/p PEG and NPO sent for altered mental status but at baseline ms, ct head ordered, r sided weakness residua.l, ct head, ordered, labs, altered mental status work up. afebrile. vss.

## 2017-09-12 ENCOUNTER — TRANSCRIPTION ENCOUNTER (OUTPATIENT)
Age: 82
End: 2017-09-12

## 2017-09-12 LAB
ALBUMIN SERPL ELPH-MCNC: 3.5 G/DL — SIGNIFICANT CHANGE UP (ref 3.3–5)
ALP SERPL-CCNC: 96 U/L — SIGNIFICANT CHANGE UP (ref 40–120)
ALT FLD-CCNC: 19 U/L — SIGNIFICANT CHANGE UP (ref 10–45)
ANION GAP SERPL CALC-SCNC: 22 MMOL/L — HIGH (ref 5–17)
AST SERPL-CCNC: 37 U/L — SIGNIFICANT CHANGE UP (ref 10–40)
BILIRUB DIRECT SERPL-MCNC: 0.1 MG/DL — SIGNIFICANT CHANGE UP (ref 0–0.2)
BILIRUB INDIRECT FLD-MCNC: 0.3 MG/DL — SIGNIFICANT CHANGE UP (ref 0.2–1)
BILIRUB SERPL-MCNC: 0.4 MG/DL — SIGNIFICANT CHANGE UP (ref 0.2–1.2)
BUN SERPL-MCNC: 28 MG/DL — HIGH (ref 7–23)
CALCIUM SERPL-MCNC: 9.3 MG/DL — SIGNIFICANT CHANGE UP (ref 8.4–10.5)
CHLORIDE SERPL-SCNC: 99 MMOL/L — SIGNIFICANT CHANGE UP (ref 96–108)
CO2 SERPL-SCNC: 17 MMOL/L — LOW (ref 22–31)
CREAT SERPL-MCNC: 0.43 MG/DL — LOW (ref 0.5–1.3)
CULTURE RESULTS: SIGNIFICANT CHANGE UP
GLUCOSE SERPL-MCNC: 115 MG/DL — HIGH (ref 70–99)
HCT VFR BLD CALC: 27.7 % — LOW (ref 34.5–45)
HGB BLD-MCNC: 8.9 G/DL — LOW (ref 11.5–15.5)
MCHC RBC-ENTMCNC: 32 PG — SIGNIFICANT CHANGE UP (ref 27–34)
MCHC RBC-ENTMCNC: 32.1 GM/DL — SIGNIFICANT CHANGE UP (ref 32–36)
MCV RBC AUTO: 99.6 FL — SIGNIFICANT CHANGE UP (ref 80–100)
PLATELET # BLD AUTO: 234 K/UL — SIGNIFICANT CHANGE UP (ref 150–400)
POTASSIUM SERPL-MCNC: 5.3 MMOL/L — SIGNIFICANT CHANGE UP (ref 3.5–5.3)
POTASSIUM SERPL-SCNC: 5.3 MMOL/L — SIGNIFICANT CHANGE UP (ref 3.5–5.3)
PROT SERPL-MCNC: 7.9 G/DL — SIGNIFICANT CHANGE UP (ref 6–8.3)
RBC # BLD: 2.78 M/UL — LOW (ref 3.8–5.2)
RBC # FLD: 13.5 % — SIGNIFICANT CHANGE UP (ref 10.3–14.5)
SODIUM SERPL-SCNC: 138 MMOL/L — SIGNIFICANT CHANGE UP (ref 135–145)
SPECIMEN SOURCE: SIGNIFICANT CHANGE UP
TSH SERPL-MCNC: 1.62 UIU/ML — SIGNIFICANT CHANGE UP (ref 0.27–4.2)
WBC # BLD: 8.16 K/UL — SIGNIFICANT CHANGE UP (ref 3.8–10.5)
WBC # FLD AUTO: 8.16 K/UL — SIGNIFICANT CHANGE UP (ref 3.8–10.5)

## 2017-09-12 PROCEDURE — 70551 MRI BRAIN STEM W/O DYE: CPT | Mod: 26

## 2017-09-12 RX ORDER — QUETIAPINE FUMARATE 200 MG/1
25 TABLET, FILM COATED ORAL AT BEDTIME
Qty: 0 | Refills: 0 | Status: DISCONTINUED | OUTPATIENT
Start: 2017-09-12 | End: 2017-09-13

## 2017-09-12 RX ORDER — LEVETIRACETAM 250 MG/1
1 TABLET, FILM COATED ORAL
Qty: 60 | Refills: 2 | OUTPATIENT
Start: 2017-09-12 | End: 2017-12-10

## 2017-09-12 RX ORDER — ALLOPURINOL 300 MG
300 TABLET ORAL DAILY
Qty: 0 | Refills: 0 | Status: DISCONTINUED | OUTPATIENT
Start: 2017-09-12 | End: 2017-09-13

## 2017-09-12 RX ORDER — LEVOTHYROXINE SODIUM 125 MCG
50 TABLET ORAL DAILY
Qty: 0 | Refills: 0 | Status: DISCONTINUED | OUTPATIENT
Start: 2017-09-12 | End: 2017-09-13

## 2017-09-12 RX ORDER — OXYCODONE AND ACETAMINOPHEN 5; 325 MG/1; MG/1
1 TABLET ORAL ONCE
Qty: 0 | Refills: 0 | Status: DISCONTINUED | OUTPATIENT
Start: 2017-09-12 | End: 2017-09-12

## 2017-09-12 RX ORDER — AMLODIPINE BESYLATE 2.5 MG/1
5 TABLET ORAL DAILY
Qty: 0 | Refills: 0 | Status: DISCONTINUED | OUTPATIENT
Start: 2017-09-12 | End: 2017-09-13

## 2017-09-12 RX ORDER — LEVETIRACETAM 250 MG/1
500 TABLET, FILM COATED ORAL EVERY 12 HOURS
Qty: 0 | Refills: 0 | Status: DISCONTINUED | OUTPATIENT
Start: 2017-09-12 | End: 2017-09-13

## 2017-09-12 RX ORDER — SODIUM CHLORIDE 9 MG/ML
1000 INJECTION, SOLUTION INTRAVENOUS
Qty: 0 | Refills: 0 | Status: DISCONTINUED | OUTPATIENT
Start: 2017-09-12 | End: 2017-09-13

## 2017-09-12 RX ORDER — LEVETIRACETAM 250 MG/1
1 TABLET, FILM COATED ORAL
Qty: 0 | Refills: 0 | COMMUNITY

## 2017-09-12 RX ORDER — ENOXAPARIN SODIUM 100 MG/ML
40 INJECTION SUBCUTANEOUS DAILY
Qty: 0 | Refills: 0 | Status: DISCONTINUED | OUTPATIENT
Start: 2017-09-12 | End: 2017-09-13

## 2017-09-12 RX ORDER — LISINOPRIL 2.5 MG/1
20 TABLET ORAL DAILY
Qty: 0 | Refills: 0 | Status: DISCONTINUED | OUTPATIENT
Start: 2017-09-12 | End: 2017-09-13

## 2017-09-12 RX ORDER — LIDOCAINE 4 G/100G
1 CREAM TOPICAL AT BEDTIME
Qty: 0 | Refills: 0 | Status: DISCONTINUED | OUTPATIENT
Start: 2017-09-12 | End: 2017-09-13

## 2017-09-12 RX ADMIN — SODIUM CHLORIDE 80 MILLILITER(S): 9 INJECTION INTRAMUSCULAR; INTRAVENOUS; SUBCUTANEOUS at 00:18

## 2017-09-12 RX ADMIN — OXYCODONE AND ACETAMINOPHEN 1 TABLET(S): 5; 325 TABLET ORAL at 03:04

## 2017-09-12 RX ADMIN — LIDOCAINE 1 PATCH: 4 CREAM TOPICAL at 16:25

## 2017-09-12 RX ADMIN — Medication 50 MICROGRAM(S): at 06:27

## 2017-09-12 RX ADMIN — LISINOPRIL 20 MILLIGRAM(S): 2.5 TABLET ORAL at 06:27

## 2017-09-12 RX ADMIN — Medication 1 APPLICATION(S): at 11:44

## 2017-09-12 RX ADMIN — SODIUM CHLORIDE 80 MILLILITER(S): 9 INJECTION, SOLUTION INTRAVENOUS at 02:00

## 2017-09-12 RX ADMIN — LIDOCAINE 1 PATCH: 4 CREAM TOPICAL at 02:05

## 2017-09-12 RX ADMIN — SODIUM CHLORIDE 80 MILLILITER(S): 9 INJECTION INTRAMUSCULAR; INTRAVENOUS; SUBCUTANEOUS at 01:55

## 2017-09-12 RX ADMIN — LEVETIRACETAM 400 MILLIGRAM(S): 250 TABLET, FILM COATED ORAL at 02:05

## 2017-09-12 RX ADMIN — OXYCODONE AND ACETAMINOPHEN 1 TABLET(S): 5; 325 TABLET ORAL at 01:57

## 2017-09-12 RX ADMIN — ENOXAPARIN SODIUM 40 MILLIGRAM(S): 100 INJECTION SUBCUTANEOUS at 16:22

## 2017-09-12 RX ADMIN — AMLODIPINE BESYLATE 5 MILLIGRAM(S): 2.5 TABLET ORAL at 06:27

## 2017-09-12 NOTE — DISCHARGE NOTE ADULT - PATIENT PORTAL LINK FT
“You can access the [employer] Patient Portal, offered by [facility], by registering with the following website: [email or web address]”

## 2017-09-12 NOTE — ED ADULT NURSE REASSESSMENT NOTE - NS ED NURSE REASSESS COMMENT FT1
Pt with l sided weakness s/p CVA Neuro consult done
patient received from previous rn. patient is alert but confused, moaning and yelling. denies any pain or discomfort when asked. VS reassessed, IV fluid started. rn to redrwa labs following iv fluid.
patient observed resting in bed. patient restless, moaning and yelling. no indication of pain or discomfort.

## 2017-09-12 NOTE — DISCHARGE NOTE ADULT - HOSPITAL COURSE
Pt is an 82y Y/O RH female with PMH HTN, GERD, ETOH abuse and on ASA who presented with a chief complaint of AMS. She was recently discharged on July 12 s/p nontraumatic right hemispheric subcortically located ICH for which she has residual neurological deficits requiring PEG tube and rehab placement. She has residual left sided deficits. She had a full stroke workup and was DC'ed on ASA and statin. Pt was also having facial twitching for which EEG was completed and showed intermittent polymorphic slowing right hemisphere and right PLEDS. Thereforeshe was also started on Keppra 500mg BID. Today she presents due to an acute change in mental status. She is muttering sounds and words,no clear or coherent speech, receptively aphasic, does not follow any commands. Exam was very limited however is moving her right side. Labs do not show any electrolyte derangements (repeat vbg showed normal K level), urinalysis was clean and chest x-ray did not show any opacities to suggest infectious source for AMS. Repeat CT head showed no acute findings suggestive of acute CVA (symptoms began last night). 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 Other differentials include viral infection (not apparent on labs and imaging) that could cause an acute change in mental status, commonly seen in the elderly, however pt remains afebrile wbc count is normal. DDx - non-convulsive seizure 2/2 structural abnormality due to IPH.    Interval history - Follow up physical exam at 12am. Pt was alert awake and oriented to person and place. Speech was fluent and she follows commands, crosses midlines. Still has speech finding difficulties, naming deficits. Pt likely is at baseline (12am).     Patient seen on 9/12 AM in the ED, patient mental status at baseline.  Her seizure medication was at inadequate levels for seizure prevention.  Her medication was adjusted from Keppra 500mg BID, to 750mg BID.  She is medically stable for transfer back to rehab facility.

## 2017-09-12 NOTE — DISCHARGE NOTE ADULT - NS AS DC STROKE ED MATERIALS
Stroke Education Booklet/Stroke Warning Signs and Symptoms/Call 911 for Stroke/Prescribed Medications/Risk Factors for Stroke/Need for Followup After Discharge

## 2017-09-12 NOTE — PHYSICAL THERAPY INITIAL EVALUATION ADULT - PERTINENT HX OF CURRENT PROBLEM, REHAB EVAL
82y Y/O female w/ PMH HTN, GERD, ETOH abuse & on ASA who presented with acute onset of AMS. Pt was discharged on July 12 to [facility] rehab s/p intraparenchymal hemorrhage for which she sustained residual neurological deficits requiring PEG tube. No tPA was given 2/2 bleed. Returns to [facility] 2/2 AMS which began last night. Nurse at [facility] reports that pt has been more and more lethargic & not herself. CONTINUED BELOW

## 2017-09-12 NOTE — DISCHARGE NOTE ADULT - MEDICATION SUMMARY - MEDICATIONS TO STOP TAKING
I will STOP taking the medications listed below when I get home from the hospital:    Keppra 500 mg oral tablet  -- 1 tab(s) by gastrostomy tube 2 times a day

## 2017-09-12 NOTE — DISCHARGE NOTE ADULT - PLAN OF CARE
Adequate seizure prophylaxis Patient should continue rehab at the facility and follow up with neurology and cardiology

## 2017-09-12 NOTE — DISCHARGE NOTE ADULT - CARE PROVIDER_API CALL
Torsten Story (DO), Neurology; Vascular Neurology  [address]  [place] [address]  Phone: [phone]  Fax: [phone]    Xiao Carrera), Cardiovascular Disease; Internal Medicine  [address]  [address] [place] [address]  Phone: [phone]  Fax: [phone]

## 2017-09-12 NOTE — DISCHARGE NOTE ADULT - OTHER SIGNIFICANT FINDINGS
CTH  Interval resolution of acute parenchymal hemorrhage in the right  basal ganglia region. Residual linear increased density in the region of the  right attainment may represent the presence of calcium and sequelae of prior  hemorrhage.    No new acute intracranial hemorrhage or evidence of acute territorial  infarct.

## 2017-09-12 NOTE — PHYSICAL THERAPY INITIAL EVALUATION ADULT - PRECAUTIONS/LIMITATIONS, REHAB EVAL
While there she usually is able to carry a conversation, makes jokes & interacts with the staff however as of last night the pt is non-responsive, goes in & out of alertness. There is a clear acute change in AMS. She remained afebrile, no recent falls or urinary changes. There was no reported symptomatic seizure activity (such as convulsion, tongue biting, eye rolling).

## 2017-09-12 NOTE — DISCHARGE NOTE ADULT - CARE PLAN
Principal Discharge DX:	Altered mental status  Goal:	Adequate seizure prophylaxis  Instructions for follow-up, activity and diet:	Patient should continue rehab at the facility and follow up with neurology and cardiology

## 2017-09-12 NOTE — DISCHARGE NOTE ADULT - MEDICATION SUMMARY - MEDICATIONS TO TAKE
I will START or STAY ON the medications listed below when I get home from the hospital:    lisinopril 40 mg oral tablet  -- 1 tab(s) by mouth once a day  -- Indication: For HTN    LORazepam 1 mg oral tablet  -- 1 tab(s) by mouth every 8 hours, As needed, Agitation  -- Indication: For seizure    Keppra 750 mg oral tablet  -- 1 tab(s) by mouth 2 times a day   -- Check with your doctor before becoming pregnant.  It is very important that you take or use this exactly as directed.  Do not skip doses or discontinue unless directed by your doctor.  May cause drowsiness or dizziness.  Obtain medical advice before taking any non-prescription drugs as some may affect the action of this medication.  Swallow whole.  Do not crush.  This drug may impair the ability to drive or operate machinery.  Use care until you become familiar with its effects.    -- Indication: For seizure    CeleXA 40 mg oral tablet  -- 1 tab(s) by mouth once a day  -- Indication: For depression    ondansetron 4 mg oral tablet  -- 1 tab(s) by mouth every 6 hours, As needed, Nausea and/or Vomiting  -- Indication: For nausea    allopurinol 300 mg oral tablet  -- 1 tab(s) by mouth once a day  -- Indication: For gout    QUEtiapine 25 mg oral tablet  -- 1 tab(s) by mouth once a day (at bedtime)  -- Indication: For depression    metoprolol succinate 25 mg oral tablet, extended release  -- 1 tab(s) by mouth once a day  -- Indication: For HF    amLODIPine 10 mg oral tablet  -- 1 tab(s) by mouth once a day  -- Indication: For HTN    nystatin 100,000 units/g topical cream  -- 1 application on skin every 8 hours  -- Indication: For fungicide    Pepcid 20 mg oral tablet  --  by mouth once a day  -- Indication: For peptic ulcer    pantoprazole 40 mg oral granule, enteric coated  -- 40 milligram(s) by mouth once a day  -- Indication: For peptic ulcer    levothyroxine 50 mcg (0.05 mg) oral tablet  -- 1 tab(s) by mouth once a day  -- Indication: For hypothyroid    folic acid 1 mg oral tablet  -- 1 tab(s) by mouth once a day  -- Indication: For supplementation

## 2017-09-13 VITALS — WEIGHT: 180.78 LBS

## 2017-09-13 RX ADMIN — Medication 300 MILLIGRAM(S): at 11:18

## 2017-09-13 RX ADMIN — LISINOPRIL 20 MILLIGRAM(S): 2.5 TABLET ORAL at 05:39

## 2017-09-13 RX ADMIN — QUETIAPINE FUMARATE 25 MILLIGRAM(S): 200 TABLET, FILM COATED ORAL at 00:02

## 2017-09-13 RX ADMIN — LIDOCAINE 1 PATCH: 4 CREAM TOPICAL at 00:01

## 2017-09-13 RX ADMIN — Medication 1 APPLICATION(S): at 11:28

## 2017-09-13 RX ADMIN — ENOXAPARIN SODIUM 40 MILLIGRAM(S): 100 INJECTION SUBCUTANEOUS at 11:19

## 2017-09-13 RX ADMIN — LEVETIRACETAM 400 MILLIGRAM(S): 250 TABLET, FILM COATED ORAL at 01:52

## 2017-09-13 RX ADMIN — Medication 50 MICROGRAM(S): at 05:39

## 2017-09-13 RX ADMIN — AMLODIPINE BESYLATE 5 MILLIGRAM(S): 2.5 TABLET ORAL at 05:39

## 2017-09-13 NOTE — PROGRESS NOTE ADULT - ASSESSMENT
82y Y/O RH female with PMH HTN, GERD, ETOH abuse and on ASA who presented with a chief complaint of AMS. She was recently discharged on July 12 s/p nontraumatic right hemispheric subcortically located ICH for which she has residual neurological deficits requiring PEG tube and rehab placement    Plan:  - increased Keppra dosage to 750mg bid  - patient stable for transfer to rehab facility, authorization done.

## 2017-09-13 NOTE — PROGRESS NOTE ADULT - SUBJECTIVE AND OBJECTIVE BOX
82y Y/O RH female with PMH HTN, GERD, ETOH abuse and on ASA who presented with a chief complaint of AMS.    Subjective  Patient was seen and examined at bedside.  Patient mental status improved.  She is stable for transfer to her rehab facility.    Objective  Vital Signs Last 24 Hrs  T(C): 36.7 (13 Sep 2017 10:25), Max: 37.4 (13 Sep 2017 05:38)  T(F): 98.1 (13 Sep 2017 10:25), Max: 99.4 (13 Sep 2017 05:38)  HR: 103 (13 Sep 2017 10:25) (73 - 109)  BP: 124/84 (13 Sep 2017 10:25) (112/68 - 138/70)  BP(mean): --  RR: 18 (13 Sep 2017 10:25) (18 - 18)  SpO2: 95% (13 Sep 2017 10:25) (95% - 96%)    Physical Exam:   General - Pt was alert awake and oriented to person, place and time  	Neurological   	MS - AAOx3  	CN - PERRL, clear or coherent speech, following commands  	Motor - moves right side with prompting, left side weak (from previous stroke)  	Sensory - grossly normal to light touch  	Coordination, gait - not assessed  	Reflexes - hyperflexia on the left side    09-12    138  |  99  |  28<H>  ----------------------------<  115<H>  5.3   |  17<L>  |  0.43<L>    Ca    9.3      12 Sep 2017 08:46    TPro  7.9  /  Alb  3.5  /  TBili  0.4  /  DBili  0.1  /  AST  37  /  ALT  19  /  AlkPhos  96  09-12

## 2017-09-13 NOTE — DIETITIAN INITIAL EVALUATION ADULT. - ENERGY NEEDS
ht = 62 inches (per previous RD note), wt = 82 kg (9/13/17), BMI = 32.0kg/m2, IBW = 52.3kg, IBW = 157%    Other Pertinent Information: 81 Y/O female with PMH HTN, GERD, ETOH abuse, p/w AMS. Recently discharged on July 12, 2017  S/P nontraumatic right hemispheric subcortically located ICH for which she has residual neurological deficits requiring PEG tube and rehab placement.  No pressure injuries noted.

## 2017-09-13 NOTE — DIETITIAN INITIAL EVALUATION ADULT. - OTHER INFO
Pt seen for chewing/swallowing difficulty consult on [facility]. Per previous RD note 6/27/17, pt with wt of 81.6kg. Current body wt of 82kg (9/13/17- no dosing wt noted, RN weighted pt infront of RN). Pt currently receiving Jevity 1.2 via PEG at 60ml/hour x 24 hours in house and tolerating per RN. No GI distress per RN. Last BM unknown to RN.

## 2017-09-13 NOTE — DIETITIAN INITIAL EVALUATION ADULT. - NS AS NUTRI INTERV ENTERAL NUTRITION
Recommend to continue Jevity 1.2 at 60ml/hour via PEG as medically feasible (provides 1,728 calories, 80 grams protein, 1,162 ml free fluid; based on current body wt of 82kg, ~21 calories/kg, ~1.0 grams protein/kg)

## 2017-09-13 NOTE — DIETITIAN INITIAL EVALUATION ADULT. - DIET TYPE
NPO with tube feedings/Jevity 1.2 at 60ml/hour (provides 1,728 calories, 80 grams protein, 1,162 ml free fluid; based on current body wt of 82kg, ~21 calories/kg, ~1.0 grams protein/kg)

## 2017-09-13 NOTE — DIETITIAN INITIAL EVALUATION ADULT. - NS AS NUTRI INTERV ED CONTENT
Inappropriate to provided nutrition education at this time. RD remains available as needed per family/caregiver request and per follow-up protocol. Analisa Gomez MS, RDN, CDN Pager # [phone]

## 2017-09-13 NOTE — DIETITIAN INITIAL EVALUATION ADULT. - ORAL INTAKE PTA
Per chart, pt transferred from [facility] and was receiving Jevity 1.2 at 60ml/hour x 24 hours. NKFA per chart. No vitamin/mineral supplementation noted in chart.

## 2017-09-14 LAB
AMPHETAMINES BLD QL SCN: NEGATIVE — SIGNIFICANT CHANGE UP
BARBITURATES SERPLBLD QL: NEGATIVE — SIGNIFICANT CHANGE UP
BENZODIAZAPINES, SERUM: NEGATIVE — SIGNIFICANT CHANGE UP
CANNABINOIDS SERPLBLD QL SCN: NEGATIVE — SIGNIFICANT CHANGE UP
COCAINE+BZE SERPLBLD QL SCN: NEGATIVE — SIGNIFICANT CHANGE UP
METHADONE SERPL-MCNC: NEGATIVE — SIGNIFICANT CHANGE UP
OPIATES SERPL QL: NEGATIVE — SIGNIFICANT CHANGE UP
PCP BLD QL SCN: NEGATIVE — SIGNIFICANT CHANGE UP

## 2017-10-01 ENCOUNTER — OUTPATIENT (OUTPATIENT)
Dept: OUTPATIENT SERVICES | Facility: HOSPITAL | Age: 82
LOS: 1 days | End: 2017-10-01
Payer: MEDICAID

## 2017-10-01 PROCEDURE — G9001: CPT

## 2017-10-03 ENCOUNTER — INPATIENT (INPATIENT)
Facility: HOSPITAL | Age: 82
LOS: 2 days | Discharge: NURSING, REHAB OR LONG-TERM CARE | DRG: 202 | End: 2017-10-06
Attending: INTERNAL MEDICINE | Admitting: INTERNAL MEDICINE
Payer: COMMERCIAL

## 2017-10-03 VITALS
SYSTOLIC BLOOD PRESSURE: 66 MMHG | OXYGEN SATURATION: 100 % | TEMPERATURE: 99 F | HEART RATE: 82 BPM | DIASTOLIC BLOOD PRESSURE: 42 MMHG | RESPIRATION RATE: 24 BRPM

## 2017-10-03 DIAGNOSIS — R06.02 SHORTNESS OF BREATH: ICD-10-CM

## 2017-10-03 LAB
ALBUMIN SERPL ELPH-MCNC: 3.4 G/DL — SIGNIFICANT CHANGE UP (ref 3.3–5)
ALP SERPL-CCNC: 81 U/L — SIGNIFICANT CHANGE UP (ref 40–120)
ALT FLD-CCNC: 17 U/L RC — SIGNIFICANT CHANGE UP (ref 10–45)
ANION GAP SERPL CALC-SCNC: 17 MMOL/L — SIGNIFICANT CHANGE UP (ref 5–17)
ANION GAP SERPL CALC-SCNC: 20 MMOL/L — HIGH (ref 5–17)
APPEARANCE UR: ABNORMAL
APTT BLD: 29 SEC — SIGNIFICANT CHANGE UP (ref 27.5–37.4)
AST SERPL-CCNC: 43 U/L — HIGH (ref 10–40)
BASE EXCESS BLDV CALC-SCNC: -1.5 MMOL/L — SIGNIFICANT CHANGE UP (ref -2–2)
BASOPHILS # BLD AUTO: 0 K/UL — SIGNIFICANT CHANGE UP (ref 0–0.2)
BASOPHILS NFR BLD AUTO: 0.3 % — SIGNIFICANT CHANGE UP (ref 0–2)
BILIRUB SERPL-MCNC: 0.2 MG/DL — SIGNIFICANT CHANGE UP (ref 0.2–1.2)
BILIRUB UR-MCNC: NEGATIVE — SIGNIFICANT CHANGE UP
BUN SERPL-MCNC: 87 MG/DL — HIGH (ref 7–23)
BUN SERPL-MCNC: 90 MG/DL — HIGH (ref 7–23)
CA-I SERPL-SCNC: 1.17 MMOL/L — SIGNIFICANT CHANGE UP (ref 1.12–1.3)
CALCIUM SERPL-MCNC: 10 MG/DL — SIGNIFICANT CHANGE UP (ref 8.4–10.5)
CALCIUM SERPL-MCNC: 9.3 MG/DL — SIGNIFICANT CHANGE UP (ref 8.4–10.5)
CHLORIDE BLDV-SCNC: 94 MMOL/L — LOW (ref 96–108)
CHLORIDE SERPL-SCNC: 89 MMOL/L — LOW (ref 96–108)
CHLORIDE SERPL-SCNC: 90 MMOL/L — LOW (ref 96–108)
CK MB BLD-MCNC: 3.3 % — SIGNIFICANT CHANGE UP (ref 0–3.5)
CK MB BLD-MCNC: 5.4 % — HIGH (ref 0–3.5)
CK MB CFR SERPL CALC: 3.3 NG/ML — SIGNIFICANT CHANGE UP (ref 0–3.8)
CK MB CFR SERPL CALC: 3.6 NG/ML — SIGNIFICANT CHANGE UP (ref 0–3.8)
CK SERPL-CCNC: 101 U/L — SIGNIFICANT CHANGE UP (ref 25–170)
CK SERPL-CCNC: 67 U/L — SIGNIFICANT CHANGE UP (ref 25–170)
CO2 BLDV-SCNC: 27 MMOL/L — SIGNIFICANT CHANGE UP (ref 22–30)
CO2 SERPL-SCNC: 22 MMOL/L — SIGNIFICANT CHANGE UP (ref 22–31)
CO2 SERPL-SCNC: 23 MMOL/L — SIGNIFICANT CHANGE UP (ref 22–31)
COLOR SPEC: YELLOW — SIGNIFICANT CHANGE UP
CREAT SERPL-MCNC: 1.72 MG/DL — HIGH (ref 0.5–1.3)
CREAT SERPL-MCNC: 1.82 MG/DL — HIGH (ref 0.5–1.3)
DIFF PNL FLD: ABNORMAL
EOSINOPHIL # BLD AUTO: 0.3 K/UL — SIGNIFICANT CHANGE UP (ref 0–0.5)
EOSINOPHIL NFR BLD AUTO: 2.6 % — SIGNIFICANT CHANGE UP (ref 0–6)
GAS PNL BLDV: 125 MMOL/L — LOW (ref 136–145)
GAS PNL BLDV: SIGNIFICANT CHANGE UP
GAS PNL BLDV: SIGNIFICANT CHANGE UP
GLUCOSE BLDV-MCNC: 122 MG/DL — HIGH (ref 70–99)
GLUCOSE SERPL-MCNC: 125 MG/DL — HIGH (ref 70–99)
GLUCOSE SERPL-MCNC: 148 MG/DL — HIGH (ref 70–99)
GLUCOSE UR QL: NEGATIVE — SIGNIFICANT CHANGE UP
HCO3 BLDV-SCNC: 25 MMOL/L — SIGNIFICANT CHANGE UP (ref 21–29)
HCT VFR BLD CALC: 26.2 % — LOW (ref 34.5–45)
HCT VFR BLDA CALC: 28 % — LOW (ref 39–50)
HGB BLD CALC-MCNC: 8.9 G/DL — LOW (ref 11.5–15.5)
HGB BLD-MCNC: 9 G/DL — LOW (ref 11.5–15.5)
INR BLD: 1.02 RATIO — SIGNIFICANT CHANGE UP (ref 0.88–1.16)
KETONES UR-MCNC: NEGATIVE — SIGNIFICANT CHANGE UP
LACTATE BLDV-MCNC: 2.8 MMOL/L — HIGH (ref 0.7–2)
LEUKOCYTE ESTERASE UR-ACNC: ABNORMAL
LYMPHOCYTES # BLD AUTO: 2.1 K/UL — SIGNIFICANT CHANGE UP (ref 1–3.3)
LYMPHOCYTES # BLD AUTO: 21.2 % — SIGNIFICANT CHANGE UP (ref 13–44)
MCHC RBC-ENTMCNC: 34.3 GM/DL — SIGNIFICANT CHANGE UP (ref 32–36)
MCHC RBC-ENTMCNC: 35.6 PG — HIGH (ref 27–34)
MCV RBC AUTO: 104 FL — HIGH (ref 80–100)
MONOCYTES # BLD AUTO: 0.7 K/UL — SIGNIFICANT CHANGE UP (ref 0–0.9)
MONOCYTES NFR BLD AUTO: 6.9 % — SIGNIFICANT CHANGE UP (ref 2–14)
NEUTROPHILS # BLD AUTO: 6.8 K/UL — SIGNIFICANT CHANGE UP (ref 1.8–7.4)
NEUTROPHILS NFR BLD AUTO: 69 % — SIGNIFICANT CHANGE UP (ref 43–77)
NITRITE UR-MCNC: NEGATIVE — SIGNIFICANT CHANGE UP
PCO2 BLDV: 54 MMHG — HIGH (ref 35–50)
PH BLDV: 7.28 — LOW (ref 7.35–7.45)
PH UR: 6 — SIGNIFICANT CHANGE UP (ref 5–8)
PLATELET # BLD AUTO: 234 K/UL — SIGNIFICANT CHANGE UP (ref 150–400)
PO2 BLDV: 45 MMHG — SIGNIFICANT CHANGE UP (ref 25–45)
POTASSIUM BLDV-SCNC: 9.6 MMOL/L — CRITICAL HIGH (ref 3.5–5)
POTASSIUM SERPL-MCNC: 5.9 MMOL/L — HIGH (ref 3.5–5.3)
POTASSIUM SERPL-MCNC: 6.9 MMOL/L — CRITICAL HIGH (ref 3.5–5.3)
POTASSIUM SERPL-SCNC: 5.9 MMOL/L — HIGH (ref 3.5–5.3)
POTASSIUM SERPL-SCNC: 6.9 MMOL/L — CRITICAL HIGH (ref 3.5–5.3)
PROT SERPL-MCNC: 7.1 G/DL — SIGNIFICANT CHANGE UP (ref 6–8.3)
PROT UR-MCNC: 30 MG/DL
PROTHROM AB SERPL-ACNC: 11 SEC — SIGNIFICANT CHANGE UP (ref 9.8–12.7)
RBC # BLD: 2.52 M/UL — LOW (ref 3.8–5.2)
RBC # FLD: 12.6 % — SIGNIFICANT CHANGE UP (ref 10.3–14.5)
RBC CASTS # UR COMP ASSIST: SIGNIFICANT CHANGE UP /HPF (ref 0–2)
SAO2 % BLDV: 78 % — SIGNIFICANT CHANGE UP (ref 67–88)
SODIUM SERPL-SCNC: 130 MMOL/L — LOW (ref 135–145)
SODIUM SERPL-SCNC: 131 MMOL/L — LOW (ref 135–145)
SP GR SPEC: 1.02 — SIGNIFICANT CHANGE UP (ref 1.01–1.02)
TROPONIN T SERPL-MCNC: <0.01 NG/ML — SIGNIFICANT CHANGE UP (ref 0–0.06)
TROPONIN T SERPL-MCNC: <0.01 NG/ML — SIGNIFICANT CHANGE UP (ref 0–0.06)
UROBILINOGEN FLD QL: 1
WBC # BLD: 9.8 K/UL — SIGNIFICANT CHANGE UP (ref 3.8–10.5)
WBC # FLD AUTO: 9.8 K/UL — SIGNIFICANT CHANGE UP (ref 3.8–10.5)
WBC UR QL: >50 /HPF (ref 0–5)

## 2017-10-03 PROCEDURE — 99285 EMERGENCY DEPT VISIT HI MDM: CPT | Mod: 25

## 2017-10-03 PROCEDURE — 99223 1ST HOSP IP/OBS HIGH 75: CPT

## 2017-10-03 PROCEDURE — 93010 ELECTROCARDIOGRAM REPORT: CPT

## 2017-10-03 PROCEDURE — 71010: CPT | Mod: 26

## 2017-10-03 RX ORDER — SODIUM CHLORIDE 9 MG/ML
500 INJECTION INTRAMUSCULAR; INTRAVENOUS; SUBCUTANEOUS ONCE
Qty: 0 | Refills: 0 | Status: COMPLETED | OUTPATIENT
Start: 2017-10-03 | End: 2017-10-03

## 2017-10-03 RX ORDER — CALCIUM GLUCONATE 100 MG/ML
1 VIAL (ML) INTRAVENOUS ONCE
Qty: 0 | Refills: 0 | Status: COMPLETED | OUTPATIENT
Start: 2017-10-03 | End: 2017-10-03

## 2017-10-03 RX ORDER — OXYCODONE HYDROCHLORIDE 5 MG/1
5 TABLET ORAL ONCE
Qty: 0 | Refills: 0 | Status: DISCONTINUED | OUTPATIENT
Start: 2017-10-03 | End: 2017-10-03

## 2017-10-03 RX ORDER — ALBUTEROL 90 UG/1
2.5 AEROSOL, METERED ORAL EVERY 6 HOURS
Qty: 0 | Refills: 0 | Status: DISCONTINUED | OUTPATIENT
Start: 2017-10-03 | End: 2017-10-06

## 2017-10-03 RX ORDER — INSULIN HUMAN 100 [IU]/ML
10 INJECTION, SOLUTION SUBCUTANEOUS ONCE
Qty: 0 | Refills: 0 | Status: COMPLETED | OUTPATIENT
Start: 2017-10-03 | End: 2017-10-03

## 2017-10-03 RX ORDER — DEXTROSE 50 % IN WATER 50 %
50 SYRINGE (ML) INTRAVENOUS ONCE
Qty: 0 | Refills: 0 | Status: COMPLETED | OUTPATIENT
Start: 2017-10-03 | End: 2017-10-03

## 2017-10-03 RX ORDER — CALCIUM GLUCONATE 100 MG/ML
2 VIAL (ML) INTRAVENOUS ONCE
Qty: 0 | Refills: 0 | Status: COMPLETED | OUTPATIENT
Start: 2017-10-03 | End: 2017-10-03

## 2017-10-03 RX ORDER — IPRATROPIUM/ALBUTEROL SULFATE 18-103MCG
3 AEROSOL WITH ADAPTER (GRAM) INHALATION ONCE
Qty: 0 | Refills: 0 | Status: COMPLETED | OUTPATIENT
Start: 2017-10-03 | End: 2017-10-03

## 2017-10-03 RX ORDER — ACETAMINOPHEN 500 MG
1000 TABLET ORAL ONCE
Qty: 0 | Refills: 0 | Status: COMPLETED | OUTPATIENT
Start: 2017-10-03 | End: 2017-10-03

## 2017-10-03 RX ORDER — SODIUM CHLORIDE 9 MG/ML
500 INJECTION INTRAMUSCULAR; INTRAVENOUS; SUBCUTANEOUS ONCE
Qty: 0 | Refills: 0 | Status: DISCONTINUED | OUTPATIENT
Start: 2017-10-03 | End: 2017-10-04

## 2017-10-03 RX ORDER — INSULIN HUMAN 100 [IU]/ML
10 INJECTION, SOLUTION SUBCUTANEOUS ONCE
Qty: 0 | Refills: 0 | Status: DISCONTINUED | OUTPATIENT
Start: 2017-10-03 | End: 2017-10-03

## 2017-10-03 RX ORDER — ALBUTEROL 90 UG/1
2.5 AEROSOL, METERED ORAL ONCE
Qty: 0 | Refills: 0 | Status: COMPLETED | OUTPATIENT
Start: 2017-10-03 | End: 2017-10-03

## 2017-10-03 RX ORDER — FUROSEMIDE 40 MG
20 TABLET ORAL ONCE
Qty: 0 | Refills: 0 | Status: COMPLETED | OUTPATIENT
Start: 2017-10-03 | End: 2017-10-03

## 2017-10-03 RX ORDER — ALBUTEROL 90 UG/1
1 AEROSOL, METERED ORAL EVERY 4 HOURS
Qty: 0 | Refills: 0 | Status: DISCONTINUED | OUTPATIENT
Start: 2017-10-03 | End: 2017-10-04

## 2017-10-03 RX ORDER — CEFTRIAXONE 500 MG/1
1 INJECTION, POWDER, FOR SOLUTION INTRAMUSCULAR; INTRAVENOUS ONCE
Qty: 0 | Refills: 0 | Status: COMPLETED | OUTPATIENT
Start: 2017-10-03 | End: 2017-10-03

## 2017-10-03 RX ADMIN — ALBUTEROL 2.5 MILLIGRAM(S): 90 AEROSOL, METERED ORAL at 18:21

## 2017-10-03 RX ADMIN — Medication 20 MILLIGRAM(S): at 23:42

## 2017-10-03 RX ADMIN — INSULIN HUMAN 10 UNIT(S): 100 INJECTION, SOLUTION SUBCUTANEOUS at 22:29

## 2017-10-03 RX ADMIN — SODIUM CHLORIDE 1000 MILLILITER(S): 9 INJECTION INTRAMUSCULAR; INTRAVENOUS; SUBCUTANEOUS at 23:42

## 2017-10-03 RX ADMIN — Medication 1000 MILLIGRAM(S): at 21:58

## 2017-10-03 RX ADMIN — OXYCODONE HYDROCHLORIDE 5 MILLIGRAM(S): 5 TABLET ORAL at 23:57

## 2017-10-03 RX ADMIN — ALBUTEROL 2.5 MILLIGRAM(S): 90 AEROSOL, METERED ORAL at 23:55

## 2017-10-03 RX ADMIN — Medication 200 GRAM(S): at 23:57

## 2017-10-03 RX ADMIN — Medication 3 MILLILITER(S): at 17:34

## 2017-10-03 RX ADMIN — Medication 50 MILLILITER(S): at 22:25

## 2017-10-03 RX ADMIN — OXYCODONE HYDROCHLORIDE 5 MILLIGRAM(S): 5 TABLET ORAL at 21:40

## 2017-10-03 RX ADMIN — INSULIN HUMAN 10 UNIT(S): 100 INJECTION, SOLUTION SUBCUTANEOUS at 18:39

## 2017-10-03 RX ADMIN — Medication 50 MILLILITER(S): at 18:21

## 2017-10-03 RX ADMIN — Medication 400 MILLIGRAM(S): at 21:39

## 2017-10-03 RX ADMIN — SODIUM CHLORIDE 500 MILLILITER(S): 9 INJECTION INTRAMUSCULAR; INTRAVENOUS; SUBCUTANEOUS at 22:21

## 2017-10-03 RX ADMIN — SODIUM CHLORIDE 500 MILLILITER(S): 9 INJECTION INTRAMUSCULAR; INTRAVENOUS; SUBCUTANEOUS at 18:31

## 2017-10-03 RX ADMIN — OXYCODONE HYDROCHLORIDE 5 MILLIGRAM(S): 5 TABLET ORAL at 20:39

## 2017-10-03 RX ADMIN — OXYCODONE HYDROCHLORIDE 5 MILLIGRAM(S): 5 TABLET ORAL at 23:47

## 2017-10-03 RX ADMIN — Medication 400 GRAM(S): at 18:22

## 2017-10-03 RX ADMIN — CEFTRIAXONE 100 GRAM(S): 500 INJECTION, POWDER, FOR SOLUTION INTRAMUSCULAR; INTRAVENOUS at 18:52

## 2017-10-03 NOTE — H&P ADULT - PROBLEM SELECTOR PLAN 1
Unclear etiology, CXR without obvious explanation and pt's current exam relatively benign. She is able to saturate 92 while talking off 02. Will complete PE work up and monitor for improvement. Trop neg x2 and very low BNP not suggestive of HF in setting of recent TTE results  -F/u V/Q scan  -F/u bilateral LE dopplers

## 2017-10-03 NOTE — ED PROVIDER NOTE - OBJECTIVE STATEMENT
83yo female p/w shortness of breath from rehab. Pt. had shortness of breath this morning at rehab, was given duonebs, solumedrol and magnesium but had persistent shortness of breath and wheezing. Pt. was saturating 90% on RA per EMS, increased with NRB 83yo female p/w shortness of breath from rehab. Pt. had shortness of breath this morning at rehab, was given duonebs, solumedrol and magnesium but had persistent shortness of breath and wheezing. Pt. was saturating 90% on RA per EMS, increased with NRB. Pt. in rehab after hemorrhagic stroke in July and has been at rehab since then. Denies chest pain, n/v, weakness, numbness/tingling.

## 2017-10-03 NOTE — H&P ADULT - PROBLEM SELECTOR PLAN 2
K significantly elevated, Unclear reason, Could be related to DOMINGO but seems abnormally elevated for degree of DOMINGO. S/p temporizing measures  -Given kayexelate  -STAT BMP  -If still significantly elevated give IV lasix

## 2017-10-03 NOTE — H&P ADULT - NSHPPHYSICALEXAM_GEN_ALL_CORE
Vital Signs Last 24 Hrs  T(C): 36.6 (10-04-17 @ 00:45), Max: 37.3 (10-03-17 @ 17:14)  T(F): 97.8 (10-04-17 @ 00:45), Max: 99.1 (10-03-17 @ 17:14)  HR: 86 (10-04-17 @ 00:45) (77 - 95)  BP: 108/68 (10-04-17 @ 00:45) (66/42 - 127/68)  BP(mean): --  RR: 16 (10-04-17 @ 00:45) (16 - 24)  SpO2: 95% (10-04-17 @ 00:45) (88% - 100%)

## 2017-10-03 NOTE — ED ADULT NURSE NOTE - PMH
CVA (cerebral vascular accident)    Dyslipidemia    ETOH abuse    Gout    HTN (hypertension)    MI (myocardial infarction)    Personal history of asbestosis    UTI (lower urinary tract infection)

## 2017-10-03 NOTE — H&P ADULT - NSHPLABSRESULTS_GEN_ALL_CORE
I have reviewed the labs, imaging and ekg. EKG remarkable for Sinus rhythm HR 90 with 1st degree AV block

## 2017-10-03 NOTE — ED ADULT NURSE NOTE - OBJECTIVE STATEMENT
81yo female pt AxOx3 BIBA from [facility] sent for resp distress this evening as per EMS. Pt states she has been having diff breathing today. Upon EMS arrival, pt was in resp distress, tachypneic, +accessory muscle use, EMS administered 2 DuoNebs, SoluMedrol 125mg, 2g MgSulfate via #22G R forearm. EMS states pt was hypotensive and they administered 1L NS as well. Pt states she feels better at this time, denies CP/N/V/fever/chills. Audible wheezing noted bilaterally, +tachypnea, NRB in place, O2Sat 88% RA, pt placed on 2L/NC, O2Sat 100%. Abd soft, NT, ND. No edema noted to b/l lower extremities. Normotensive. Pt has splint in place to LUE r/t contractures. PMHx CVA w/ L sided deficits. #20G R hand placed, #20G LAC placed, labs drawn and sent. NAD noted. Safety maintained. MDs at bedside for eval. EKG @ bedside.

## 2017-10-03 NOTE — ED PROVIDER NOTE - MEDICAL DECISION MAKING DETAILS
***Attending Andrea Goldberg MD***   82F hx CVA presents from rehab for worsening SOB and mild hypoxia.  As per EMS, received duonebs, solumedrol and magnesium with improvement.  No known hx of bronchospasm.  Denies chest pain, fever/chills, cough.  Mild hypoxia on RA with improvement on NC.  b/l wheezing, speaking in full sentences.  ekg, cxr, labs including Agnes, pBNP.  differential dx includes but not limited to bronchospasm v ACS v PE     Progress:  Pt improved with nebs.  Negative trop.  likely bronchospasm still risk of PE given immobilization however pt with DOMINGO,  given HD stability and negative trop would not qualify as massive and unlikely submassive PE.  Contraindication to a/c given recent ICH.  need for V/Q for further eval d/w accepting physician Favio Almazan.

## 2017-10-03 NOTE — H&P ADULT - PROBLEM SELECTOR PLAN 3
Unclear etiology, suspect pre-renal at this point but if not improving may need additional work up and imaging.  -Trend vital signs  -Limit nephrotoxic agents

## 2017-10-03 NOTE — ED PROVIDER NOTE - MUSCULOSKELETAL, MLM
Spine appears normal, range of motion is not limited, no muscle or joint tenderness. Left sided weakness

## 2017-10-03 NOTE — H&P ADULT - PROBLEM SELECTOR PLAN 4
Pt with residual dysarthria, weakness and dysphagia. Pt states she is unable to walk.  S/p PEG tube  -Cont. tube feeds, nutrition consult  -PT consult s/p work up  -See above regarding immobility

## 2017-10-03 NOTE — H&P ADULT - HISTORY OF PRESENT ILLNESS
82F w/ recent hemorrhagic CVA, HTN, Hx MI, ETOH, HLD, gout p/w SOB and reported respiratory distress. Pt states she has felt generally in her baseline state of health when today her MD at rehab stated she appeared to have difficulty breathing and was "wheezing." Pt states she may have had a faint wheeze for a couple of days but was not sure as she felt relatively well. Pt was then taken to [facility] for further evaluation for SOB and was possibly hypoxic to the 80s based on rehab records. Pt otherwise denies chest pain, SOB, palpitations, significant cough, fever or chills. She endorses semi-chronic shannen. LE edema with some discomfort since her CVA. She states she has been at rehab since her CVA. Pt was reportedly given solumedrol, duonebs and Mg.    In ER: Given NS 2L, ceftriaxone 1gm, Tylenol 1gm, lasix 20IV, insulin 10IV x2, calcium gluconate 3gm

## 2017-10-03 NOTE — H&P ADULT - NSHPSOCIALHISTORY_GEN_ALL_CORE
Patient used to live by self, [facility]. Has daughter who lives in [place] but does not usually participate in care much. Denies smoking hx. Records indicate hx of ETOH. Currently immobile at rehab after CVA

## 2017-10-04 DIAGNOSIS — M10.9 GOUT, UNSPECIFIED: ICD-10-CM

## 2017-10-04 DIAGNOSIS — N39.0 URINARY TRACT INFECTION, SITE NOT SPECIFIED: ICD-10-CM

## 2017-10-04 DIAGNOSIS — I10 ESSENTIAL (PRIMARY) HYPERTENSION: ICD-10-CM

## 2017-10-04 DIAGNOSIS — R06.02 SHORTNESS OF BREATH: ICD-10-CM

## 2017-10-04 DIAGNOSIS — N17.9 ACUTE KIDNEY FAILURE, UNSPECIFIED: ICD-10-CM

## 2017-10-04 DIAGNOSIS — Z29.9 ENCOUNTER FOR PROPHYLACTIC MEASURES, UNSPECIFIED: ICD-10-CM

## 2017-10-04 DIAGNOSIS — E87.5 HYPERKALEMIA: ICD-10-CM

## 2017-10-04 DIAGNOSIS — I63.9 CEREBRAL INFARCTION, UNSPECIFIED: ICD-10-CM

## 2017-10-04 LAB
ANION GAP SERPL CALC-SCNC: 17 MMOL/L — SIGNIFICANT CHANGE UP (ref 5–17)
ANION GAP SERPL CALC-SCNC: 18 MMOL/L — HIGH (ref 5–17)
ANION GAP SERPL CALC-SCNC: 19 MMOL/L — HIGH (ref 5–17)
ANISOCYTOSIS BLD QL: SLIGHT — SIGNIFICANT CHANGE UP
BASOPHILS # BLD AUTO: 0 K/UL — SIGNIFICANT CHANGE UP (ref 0–0.2)
BASOPHILS NFR BLD AUTO: 0 % — SIGNIFICANT CHANGE UP (ref 0–2)
BUN SERPL-MCNC: 73 MG/DL — HIGH (ref 7–23)
BUN SERPL-MCNC: 78 MG/DL — HIGH (ref 7–23)
BUN SERPL-MCNC: 86 MG/DL — HIGH (ref 7–23)
CALCIUM SERPL-MCNC: 10.2 MG/DL — SIGNIFICANT CHANGE UP (ref 8.4–10.5)
CALCIUM SERPL-MCNC: 9.6 MG/DL — SIGNIFICANT CHANGE UP (ref 8.4–10.5)
CALCIUM SERPL-MCNC: 9.8 MG/DL — SIGNIFICANT CHANGE UP (ref 8.4–10.5)
CHLORIDE SERPL-SCNC: 92 MMOL/L — LOW (ref 96–108)
CHLORIDE SERPL-SCNC: 95 MMOL/L — LOW (ref 96–108)
CHLORIDE SERPL-SCNC: 96 MMOL/L — SIGNIFICANT CHANGE UP (ref 96–108)
CO2 SERPL-SCNC: 20 MMOL/L — LOW (ref 22–31)
CO2 SERPL-SCNC: 22 MMOL/L — SIGNIFICANT CHANGE UP (ref 22–31)
CO2 SERPL-SCNC: 24 MMOL/L — SIGNIFICANT CHANGE UP (ref 22–31)
CREAT SERPL-MCNC: 1.18 MG/DL — SIGNIFICANT CHANGE UP (ref 0.5–1.3)
CREAT SERPL-MCNC: 1.38 MG/DL — HIGH (ref 0.5–1.3)
CREAT SERPL-MCNC: 1.66 MG/DL — HIGH (ref 0.5–1.3)
EOSINOPHIL # BLD AUTO: 0 K/UL — SIGNIFICANT CHANGE UP (ref 0–0.5)
EOSINOPHIL NFR BLD AUTO: 0 % — SIGNIFICANT CHANGE UP (ref 0–6)
GLUCOSE SERPL-MCNC: 138 MG/DL — HIGH (ref 70–99)
GLUCOSE SERPL-MCNC: 156 MG/DL — HIGH (ref 70–99)
GLUCOSE SERPL-MCNC: 162 MG/DL — HIGH (ref 70–99)
HCT VFR BLD CALC: 25.3 % — LOW (ref 34.5–45)
HGB BLD-MCNC: 8.7 G/DL — LOW (ref 11.5–15.5)
HYPOCHROMIA BLD QL: SLIGHT — SIGNIFICANT CHANGE UP
LYMPHOCYTES # BLD AUTO: 0.9 K/UL — LOW (ref 1–3.3)
LYMPHOCYTES # BLD AUTO: 6.9 % — LOW (ref 13–44)
MACROCYTES BLD QL: SLIGHT — SIGNIFICANT CHANGE UP
MAGNESIUM SERPL-MCNC: 2.7 MG/DL — HIGH (ref 1.6–2.6)
MCHC RBC-ENTMCNC: 34.3 GM/DL — SIGNIFICANT CHANGE UP (ref 32–36)
MCHC RBC-ENTMCNC: 35.7 PG — HIGH (ref 27–34)
MCV RBC AUTO: 104 FL — HIGH (ref 80–100)
MICROCYTES BLD QL: SLIGHT — SIGNIFICANT CHANGE UP
MONOCYTES # BLD AUTO: 0.5 K/UL — SIGNIFICANT CHANGE UP (ref 0–0.9)
MONOCYTES NFR BLD AUTO: 4.4 % — SIGNIFICANT CHANGE UP (ref 2–14)
NEUTROPHILS # BLD AUTO: 11 K/UL — HIGH (ref 1.8–7.4)
NEUTROPHILS NFR BLD AUTO: 88.6 % — HIGH (ref 43–77)
PLAT MORPH BLD: NORMAL — SIGNIFICANT CHANGE UP
PLATELET # BLD AUTO: 209 K/UL — SIGNIFICANT CHANGE UP (ref 150–400)
POIKILOCYTOSIS BLD QL AUTO: SLIGHT — SIGNIFICANT CHANGE UP
POTASSIUM SERPL-MCNC: 5 MMOL/L — SIGNIFICANT CHANGE UP (ref 3.5–5.3)
POTASSIUM SERPL-MCNC: 5.2 MMOL/L — SIGNIFICANT CHANGE UP (ref 3.5–5.3)
POTASSIUM SERPL-MCNC: 6.2 MMOL/L — CRITICAL HIGH (ref 3.5–5.3)
POTASSIUM SERPL-SCNC: 5 MMOL/L — SIGNIFICANT CHANGE UP (ref 3.5–5.3)
POTASSIUM SERPL-SCNC: 5.2 MMOL/L — SIGNIFICANT CHANGE UP (ref 3.5–5.3)
POTASSIUM SERPL-SCNC: 6.2 MMOL/L — CRITICAL HIGH (ref 3.5–5.3)
RBC # BLD: 2.43 M/UL — LOW (ref 3.8–5.2)
RBC # FLD: 12.8 % — SIGNIFICANT CHANGE UP (ref 10.3–14.5)
RBC BLD AUTO: ABNORMAL
SODIUM SERPL-SCNC: 131 MMOL/L — LOW (ref 135–145)
SODIUM SERPL-SCNC: 135 MMOL/L — SIGNIFICANT CHANGE UP (ref 135–145)
SODIUM SERPL-SCNC: 137 MMOL/L — SIGNIFICANT CHANGE UP (ref 135–145)
STOMATOCYTES BLD QL SMEAR: PRESENT — SIGNIFICANT CHANGE UP
WBC # BLD: 12.4 K/UL — HIGH (ref 3.8–10.5)
WBC # FLD AUTO: 12.4 K/UL — HIGH (ref 3.8–10.5)

## 2017-10-04 PROCEDURE — 78582 LUNG VENTILAT&PERFUS IMAGING: CPT | Mod: 26

## 2017-10-04 RX ORDER — ALLOPURINOL 300 MG
1 TABLET ORAL
Qty: 0 | Refills: 0 | COMMUNITY

## 2017-10-04 RX ORDER — METOPROLOL TARTRATE 50 MG
25 TABLET ORAL
Qty: 0 | Refills: 0 | Status: DISCONTINUED | OUTPATIENT
Start: 2017-10-04 | End: 2017-10-06

## 2017-10-04 RX ORDER — DEXTROSE 50 % IN WATER 50 %
50 SYRINGE (ML) INTRAVENOUS ONCE
Qty: 0 | Refills: 0 | Status: COMPLETED | OUTPATIENT
Start: 2017-10-04 | End: 2017-10-04

## 2017-10-04 RX ORDER — OXYCODONE HYDROCHLORIDE 5 MG/1
5 TABLET ORAL ONCE
Qty: 0 | Refills: 0 | Status: DISCONTINUED | OUTPATIENT
Start: 2017-10-04 | End: 2017-10-05

## 2017-10-04 RX ORDER — LEVETIRACETAM 250 MG/1
750 TABLET, FILM COATED ORAL
Qty: 0 | Refills: 0 | Status: DISCONTINUED | OUTPATIENT
Start: 2017-10-04 | End: 2017-10-06

## 2017-10-04 RX ORDER — FOLIC ACID 0.8 MG
1 TABLET ORAL DAILY
Qty: 0 | Refills: 0 | Status: DISCONTINUED | OUTPATIENT
Start: 2017-10-04 | End: 2017-10-06

## 2017-10-04 RX ORDER — LEVOTHYROXINE SODIUM 125 MCG
50 TABLET ORAL DAILY
Qty: 0 | Refills: 0 | Status: DISCONTINUED | OUTPATIENT
Start: 2017-10-04 | End: 2017-10-06

## 2017-10-04 RX ORDER — CITALOPRAM 10 MG/1
40 TABLET, FILM COATED ORAL DAILY
Qty: 0 | Refills: 0 | Status: DISCONTINUED | OUTPATIENT
Start: 2017-10-04 | End: 2017-10-06

## 2017-10-04 RX ORDER — CEFTRIAXONE 500 MG/1
1 INJECTION, POWDER, FOR SOLUTION INTRAMUSCULAR; INTRAVENOUS EVERY 24 HOURS
Qty: 0 | Refills: 0 | Status: COMPLETED | OUTPATIENT
Start: 2017-10-04 | End: 2017-10-05

## 2017-10-04 RX ORDER — CITALOPRAM 10 MG/1
1 TABLET, FILM COATED ORAL
Qty: 0 | Refills: 0 | COMMUNITY

## 2017-10-04 RX ORDER — INSULIN HUMAN 100 [IU]/ML
10 INJECTION, SOLUTION SUBCUTANEOUS ONCE
Qty: 0 | Refills: 0 | Status: COMPLETED | OUTPATIENT
Start: 2017-10-04 | End: 2017-10-04

## 2017-10-04 RX ORDER — FUROSEMIDE 40 MG
20 TABLET ORAL ONCE
Qty: 0 | Refills: 0 | Status: COMPLETED | OUTPATIENT
Start: 2017-10-04 | End: 2017-10-04

## 2017-10-04 RX ORDER — SENNA PLUS 8.6 MG/1
10 TABLET ORAL ONCE
Qty: 0 | Refills: 0 | Status: COMPLETED | OUTPATIENT
Start: 2017-10-04 | End: 2017-10-04

## 2017-10-04 RX ORDER — ONDANSETRON 8 MG/1
4 TABLET, FILM COATED ORAL EVERY 6 HOURS
Qty: 0 | Refills: 0 | Status: DISCONTINUED | OUTPATIENT
Start: 2017-10-04 | End: 2017-10-04

## 2017-10-04 RX ORDER — PANTOPRAZOLE SODIUM 20 MG/1
40 TABLET, DELAYED RELEASE ORAL
Qty: 0 | Refills: 0 | Status: DISCONTINUED | OUTPATIENT
Start: 2017-10-04 | End: 2017-10-04

## 2017-10-04 RX ORDER — SODIUM CHLORIDE 9 MG/ML
1000 INJECTION INTRAMUSCULAR; INTRAVENOUS; SUBCUTANEOUS
Qty: 0 | Refills: 0 | Status: DISCONTINUED | OUTPATIENT
Start: 2017-10-04 | End: 2017-10-04

## 2017-10-04 RX ORDER — PANTOPRAZOLE SODIUM 20 MG/1
40 TABLET, DELAYED RELEASE ORAL
Qty: 0 | Refills: 0 | Status: DISCONTINUED | OUTPATIENT
Start: 2017-10-04 | End: 2017-10-06

## 2017-10-04 RX ORDER — AMLODIPINE BESYLATE 2.5 MG/1
10 TABLET ORAL DAILY
Qty: 0 | Refills: 0 | Status: DISCONTINUED | OUTPATIENT
Start: 2017-10-04 | End: 2017-10-06

## 2017-10-04 RX ORDER — OXYCODONE AND ACETAMINOPHEN 5; 325 MG/1; MG/1
1 TABLET ORAL EVERY 6 HOURS
Qty: 0 | Refills: 0 | Status: DISCONTINUED | OUTPATIENT
Start: 2017-10-04 | End: 2017-10-06

## 2017-10-04 RX ORDER — SENNA PLUS 8.6 MG/1
2 TABLET ORAL ONCE
Qty: 0 | Refills: 0 | Status: DISCONTINUED | OUTPATIENT
Start: 2017-10-04 | End: 2017-10-04

## 2017-10-04 RX ORDER — ALLOPURINOL 300 MG
300 TABLET ORAL DAILY
Qty: 0 | Refills: 0 | Status: DISCONTINUED | OUTPATIENT
Start: 2017-10-04 | End: 2017-10-06

## 2017-10-04 RX ORDER — FAMOTIDINE 10 MG/ML
20 INJECTION INTRAVENOUS DAILY
Qty: 0 | Refills: 0 | Status: DISCONTINUED | OUTPATIENT
Start: 2017-10-04 | End: 2017-10-06

## 2017-10-04 RX ORDER — SODIUM POLYSTYRENE SULFONATE 4.1 MEQ/G
30 POWDER, FOR SUSPENSION ORAL ONCE
Qty: 0 | Refills: 0 | Status: DISCONTINUED | OUTPATIENT
Start: 2017-10-04 | End: 2017-10-04

## 2017-10-04 RX ORDER — SODIUM POLYSTYRENE SULFONATE 4.1 MEQ/G
30 POWDER, FOR SUSPENSION ORAL ONCE
Qty: 0 | Refills: 0 | Status: COMPLETED | OUTPATIENT
Start: 2017-10-04 | End: 2017-10-04

## 2017-10-04 RX ADMIN — Medication 25 MILLIGRAM(S): at 17:32

## 2017-10-04 RX ADMIN — CITALOPRAM 40 MILLIGRAM(S): 10 TABLET, FILM COATED ORAL at 11:55

## 2017-10-04 RX ADMIN — LEVETIRACETAM 750 MILLIGRAM(S): 250 TABLET, FILM COATED ORAL at 06:51

## 2017-10-04 RX ADMIN — OXYCODONE AND ACETAMINOPHEN 1 TABLET(S): 5; 325 TABLET ORAL at 10:08

## 2017-10-04 RX ADMIN — OXYCODONE AND ACETAMINOPHEN 1 TABLET(S): 5; 325 TABLET ORAL at 21:35

## 2017-10-04 RX ADMIN — ALBUTEROL 2.5 MILLIGRAM(S): 90 AEROSOL, METERED ORAL at 06:53

## 2017-10-04 RX ADMIN — LEVETIRACETAM 750 MILLIGRAM(S): 250 TABLET, FILM COATED ORAL at 17:11

## 2017-10-04 RX ADMIN — ALBUTEROL 2.5 MILLIGRAM(S): 90 AEROSOL, METERED ORAL at 11:53

## 2017-10-04 RX ADMIN — Medication 50 MILLILITER(S): at 03:14

## 2017-10-04 RX ADMIN — Medication 300 MILLIGRAM(S): at 11:58

## 2017-10-04 RX ADMIN — Medication 25 MILLIGRAM(S): at 06:54

## 2017-10-04 RX ADMIN — OXYCODONE AND ACETAMINOPHEN 1 TABLET(S): 5; 325 TABLET ORAL at 10:39

## 2017-10-04 RX ADMIN — ALBUTEROL 2.5 MILLIGRAM(S): 90 AEROSOL, METERED ORAL at 17:11

## 2017-10-04 RX ADMIN — PANTOPRAZOLE SODIUM 40 MILLIGRAM(S): 20 TABLET, DELAYED RELEASE ORAL at 06:54

## 2017-10-04 RX ADMIN — SENNA PLUS 10 MILLILITER(S): 8.6 TABLET ORAL at 06:51

## 2017-10-04 RX ADMIN — Medication 50 MICROGRAM(S): at 06:53

## 2017-10-04 RX ADMIN — CEFTRIAXONE 100 GRAM(S): 500 INJECTION, POWDER, FOR SOLUTION INTRAMUSCULAR; INTRAVENOUS at 04:34

## 2017-10-04 RX ADMIN — OXYCODONE AND ACETAMINOPHEN 1 TABLET(S): 5; 325 TABLET ORAL at 20:35

## 2017-10-04 RX ADMIN — SODIUM POLYSTYRENE SULFONATE 30 GRAM(S): 4.1 POWDER, FOR SUSPENSION ORAL at 02:59

## 2017-10-04 RX ADMIN — AMLODIPINE BESYLATE 10 MILLIGRAM(S): 2.5 TABLET ORAL at 06:53

## 2017-10-04 RX ADMIN — Medication 20 MILLIGRAM(S): at 03:27

## 2017-10-04 RX ADMIN — FAMOTIDINE 20 MILLIGRAM(S): 10 INJECTION INTRAVENOUS at 11:55

## 2017-10-04 RX ADMIN — INSULIN HUMAN 10 UNIT(S): 100 INJECTION, SOLUTION SUBCUTANEOUS at 03:15

## 2017-10-04 RX ADMIN — Medication 1 MILLIGRAM(S): at 11:55

## 2017-10-04 NOTE — CONSULT NOTE ADULT - SUBJECTIVE AND OBJECTIVE BOX
CHIEF COMPLAINT:Patient is a 82y old  Female who presents with a chief complaint of Respiratory Distress (03 Oct 2017 23:43)      HPI:  82F w/ recent hemorrhagic CVA, HTN, Hx MI, ETOH, HLD, gout p/w SOB and reported respiratory distress. Pt states she has felt generally in her baseline state of health when today her MD at rehab stated she appeared to have difficulty breathing and was "wheezing." Pt states she may have had a faint wheeze for a couple of days but was not sure as she felt relatively well. Pt was then taken to [facility] for further evaluation for SOB and was possibly hypoxic to the 80s based on rehab records. Pt otherwise denies chest pain, SOB, palpitations, significant cough, fever or chills. She endorses semi-chronic shannen. LE edema with some discomfort since her CVA. She states she has been at rehab since her CVA. Pt was reportedly given solumedrol, duonebs and Mg.  pt denies of any chest pain.no known hx of cad.  In ER: Given NS 2L, ceftriaxone 1gm, Tylenol 1gm, lasix 20IV, insulin 10IV x2, calcium gluconate 3gm (03 Oct 2017 23:43)      PAST MEDICAL & SURGICAL HISTORY:  CVA (cerebral vascular accident)  ETOH abuse  UTI (lower urinary tract infection)  Dyslipidemia  Gout  Personal history of asbestosis  HTN (hypertension)  MI (myocardial infarction)  History of benign breast tumor  History of left shoulder fracture      MEDICATIONS  (STANDING):  ALBUTerol    0.083% 2.5 milliGRAM(s) Nebulizer every 6 hours  ALBUTerol    90 MICROgram(s) HFA Inhaler 1 Puff(s) Inhalation every 4 hours  allopurinol 300 milliGRAM(s) Oral daily  amLODIPine   Tablet 10 milliGRAM(s) Oral daily  cefTRIAXone   IVPB 1 Gram(s) IV Intermittent every 24 hours  citalopram 40 milliGRAM(s) Oral daily  famotidine    Tablet 20 milliGRAM(s) Oral daily  folic acid 1 milliGRAM(s) Oral daily  levETIRAcetam  Solution 750 milliGRAM(s) Oral two times a day  levothyroxine 50 MICROGram(s) Oral daily  metoprolol 25 milliGRAM(s) Oral two times a day  oxyCODONE    IR 5 milliGRAM(s) Oral once  pantoprazole   Suspension 40 milliGRAM(s) Oral before breakfast  sodium chloride 0.9% Bolus 500 milliLiter(s) IV Bolus once  sodium chloride 0.9%. 1000 milliLiter(s) (100 mL/Hr) IV Continuous <Continuous>    MEDICATIONS  (PRN):  ondansetron    Tablet 4 milliGRAM(s) Oral every 6 hours PRN Nausea and/or Vomiting  oxyCODONE    5 mG/acetaminophen 325 mG 1 Tablet(s) Oral every 6 hours PRN Moderate Pain (4 - 6)      FAMILY HISTORY:  No pertinent family history in first degree relatives      SOCIAL HISTORY:    [ ] Non-smoker  [ ] Smoker  [ ] Alcohol    Allergies    Toradol (Urticaria (Mild to Mod); Rash (Mild to Mod))    Intolerances    	    REVIEW OF SYSTEMS:  CONSTITUTIONAL: No fever, weight loss, or fatigue  EYES: No eye pain, visual disturbances, or discharge  ENT:  No difficulty hearing, tinnitus, vertigo; No sinus or throat pain  NECK: No pain or stiffness  RESPIRATORY: No cough, wheezing, chills or hemoptysis; +Shortness of Breath  CARDIOVASCULAR: No chest pain, palpitations, passing out, dizziness, or leg swelling  GASTROINTESTINAL: No abdominal or epigastric pain. No nausea, vomiting, or hematemesis; No diarrhea or constipation. No melena or hematochezia.  GENITOURINARY: No dysuria, frequency, hematuria, or incontinence  NEUROLOGICAL: No headaches, memory loss, loss of strength, numbness, or tremors  SKIN: No itching, burning, rashes, or lesions   LYMPH Nodes: No enlarged glands  ENDOCRINE: No heat or cold intolerance; No hair loss  MUSCULOSKELETAL: No joint pain or swelling; No muscle, back, or extremity pain,+edema  PSYCHIATRIC: No depression, anxiety, mood swings, or difficulty sleeping  HEME/LYMPH: No easy bruising, or bleeding gums  ALLERGY AND IMMUNOLOGIC: No hives or eczema	    [ ] All others negative	  [ ] Unable to obtain    PHYSICAL EXAM:  T(C): 36.4 (10-04-17 @ 05:26), Max: 37.3 (10-03-17 @ 17:14)  HR: 82 (10-04-17 @ 05:26) (77 - 99)  BP: 137/74 (10-04-17 @ 05:26) (66/42 - 138/72)  RR: 22 (10-04-17 @ 05:26) (16 - 24)  SpO2: 100% (10-04-17 @ 05:26) (88% - 100%)  Wt(kg): --  I&O's Summary      Appearance: Normal	  HEENT:   Normal oral mucosa, PERRL, EOMI	  Lymphatic: No lymphadenopathy  Cardiovascular: Normal S1 S2, No JVD, + systolic  murmurs, No edema  Respiratory: Lungs clear to auscultation	  Psychiatry: A & O x 3, Mood & affect appropriate  Gastrointestinal:  Soft, Non-tender, + BS	  Skin: No rashes, No ecchymoses, No cyanosis	  Neurologic: Non-focal  Extremities: Normal range of motion, No clubbing, cyanosis or edema  Vascular: Peripheral pulses palpable 2+ bilaterally    TELEMETRY: 	    ECG:  	  RADIOLOGY:  OTHER: 	  	  LABS:	 	    CARDIAC MARKERS:  CARDIAC MARKERS ( 03 Oct 2017 21:54 )  x     / <0.01 ng/mL / 67 U/L / x     / 3.6 ng/mL  CARDIAC MARKERS ( 03 Oct 2017 17:33 )  x     / <0.01 ng/mL / 101 U/L / x     / 3.3 ng/mL                              9.0    9.8   )-----------( 234      ( 03 Oct 2017 17:33 )             26.2     10-04    131<L>  |  92<L>  |  86<H>  ----------------------------<  162<H>  6.2<HH>   |  20<L>  |  1.66<H>    Ca    10.2      04 Oct 2017 01:50    TPro  7.1  /  Alb  3.4  /  TBili  0.2  /  DBili  x   /  AST  43<H>  /  ALT  17  /  AlkPhos  81  10-03    proBNP: Serum Pro-Brain Natriuretic Peptide: 170 pg/mL (10-03 @ 11:54)    Lipid Profile:   HgA1c:   TSH:   PT/INR - ( 03 Oct 2017 17:33 )   PT: 11.0 sec;   INR: 1.02 ratio         PTT - ( 03 Oct 2017 17:33 )  PTT:29.0 sec      < from: Transthoracic Echocardiogram (07.05.17 @ 10:24) >  1. Normal left ventricular internal dimensions and wall  thicknesses.  2. Normal left ventricular systolic function. No segmental  wall motion abnormalities.  3. Normal diastolic function  4. Normal right ventricular size and function.  *** Compared with echocardiogram of 2/24/2015, no    < from: Nuclear Stress Test, Pharmacologic (05.21.12 @ 00:00) >  * Tracer uptake was homogeneous throughout the left  ventricle.  * Normal study; no evidence for myocardial infarction or  ischemia.  * Gated wall motion analysis was performed, and shows  normal wall motion.* Chest Pain: No chest pain with  administration of Regadenoson.  * Symptom: No Symptom.  * HR Response: Appropriate.  * BP Response: Appropriate.  * Heart Rhythm: Sinus Rhythm - 55 BPM.  * ECG Abnormalities: None.  * Arrhythmia: None.  * Review of raw data shows: The study is of good technical  quality.  * The left ventricle was normal in size. Normal myocardial  perfusion scan, with no evidence of infarction or  inducible ischemia.  * Gated wall motion analysis is performed, and shows  normal wall motion with post stress LVEF of 74% and post  stress LVEDV of  57 ml.

## 2017-10-04 NOTE — DIETITIAN INITIAL EVALUATION ADULT. - OTHER INFO
Pt seen for consult nutrition assessment: tubefeeding. Pt is currently alert and verbal, seen in bed. Pt reports feeling generally well, denies GI complaints no nausea/vomiting/constipation, abd. pain or diarrhea currently or related to feeding. Does admit to dysphagia s/p CVA x~3 months ago, she also has fecal incontinence "it just comes out" denies this is related to tubefeeding and states she generally tolerates tube feedings well with no significant issues. Pt unsure of recent wt changes, per previous RD notes patient was previously stable at around 180 pounds both June 2017, and Sept 2017. Per current bedscale pt 187 pounds, semi chronic B/L leg edema noted. Pt at [facility], per [facility] records pt was receiving Jevity 1.5 total of 1120 ml/1680 kcal via pump. Pt denies food allergies/intolerance.

## 2017-10-04 NOTE — PROGRESS NOTE ADULT - ASSESSMENT
82 year old female PMH recent hemorrhagic CVA, PEG, HTN, MI, ETOH abuse, HLD, gout p/w SOB and reported respiratory distress. Pt states she has felt generally in her baseline state of health when today her MD at rehab stated she appeared to have difficulty breathing and was "wheezing." Pt states she may have had a faint wheeze for a couple of days but was not sure as she felt relatively well. Pt was then taken to [facility] for further evaluation for SOB and was possibly hypoxic to the 80s based on rehab records. She states she has been at rehab since her CVA. Pt was reportedly given solumedrol, duonebs and Mg in the ER.    Renal:       Pulmonary:        CV: 82 year old female PMH recent hemorrhagic CVA, PEG, HTN, MI, ETOH abuse, HLD, gout p/w SOB and reported respiratory distress. Pt states she has felt generally in her baseline state of health when today her MD at rehab stated she appeared to have difficulty breathing and was "wheezing." Pt states she may have had a faint wheeze for a couple of days but was not sure as she felt relatively well. Pt was then taken to [facility] for further evaluation for SOB and was possibly hypoxic to the 80s based on rehab records. She states she has been at rehab since her CVA. Pt was reportedly given solumedrol, duonebs and Mg in the ER.    Renal: Repeat K < 5.0. DOMINGO improved. Stop IVF. Stop ACE as this may cause hyperkalemia. CKD III stable.       Pulmonary:  V/Q is low prob. continue duonebs every 6 hours.       CV: tele is NSR. Will stop telemetry. Continue Norvasc 10 mg/day and lopressor 25 mg bid. EKG is NSR.    Endo: Continue synthroid 50 mcg/day. 82 year old female PMH recent hemorrhagic CVA, PEG, HTN, MI, ETOH abuse, HLD, gout p/w SOB and reported respiratory distress. Pt states she has felt generally in her baseline state of health when today her MD at rehab stated she appeared to have difficulty breathing and was "wheezing." Pt states she may have had a faint wheeze for a couple of days but was not sure as she felt relatively well. Pt was then taken to [facility] for further evaluation for SOB and was possibly hypoxic to the 80s based on rehab records. She states she has been at rehab since her CVA. Pt was reportedly given solumedrol, duonebs and Mg in the ER.    Renal: Repeat K < 5.0. DOMINGO improved. Stop IVF. Stop ACE as this may cause hyperkalemia. CKD III stable.     Pulmonary:  V/Q is low prob. Continue duonebs every 6 hours.     CV: tele is NSR. Will stop telemetry. Continue Norvasc 10 mg/day and lopressor 25 mg bid. EKG is NSR.    Endo: Continue synthroid 50 mcg/day.    Neuro: Continue Kepra 750 mg bid for seizure prophylaxis and Celexa for post-stroke depression.     GI: continue PEG feeds as outlined in sunrise.       Discharge to rehab AM Friday.

## 2017-10-04 NOTE — PROGRESS NOTE ADULT - SUBJECTIVE AND OBJECTIVE BOX
CC/F/U for: Dehydration and hyperkalemia    INTERVAL HPI/OVERNIGHT EVENTS:  Pt seen and examined at bedside.     Allergies/Intolerance: Toradol (Urticaria (Mild to Mod); Rash (Mild to Mod))      MEDICATIONS  (STANDING):  ALBUTerol    0.083% 2.5 milliGRAM(s) Nebulizer every 6 hours  allopurinol 300 milliGRAM(s) Oral daily  amLODIPine   Tablet 10 milliGRAM(s) Oral daily  cefTRIAXone   IVPB 1 Gram(s) IV Intermittent every 24 hours  citalopram 40 milliGRAM(s) Oral daily  famotidine    Tablet 20 milliGRAM(s) Oral daily  folic acid 1 milliGRAM(s) Oral daily  levETIRAcetam  Solution 750 milliGRAM(s) Oral two times a day  levothyroxine 50 MICROGram(s) Oral daily  metoprolol 25 milliGRAM(s) Oral two times a day  oxyCODONE    IR 5 milliGRAM(s) Oral once  pantoprazole   Suspension 40 milliGRAM(s) Oral before breakfast  sodium chloride 0.9% Bolus 500 milliLiter(s) IV Bolus once  sodium chloride 0.9%. 1000 milliLiter(s) (100 mL/Hr) IV Continuous <Continuous>    MEDICATIONS  (PRN):  ondansetron    Tablet 4 milliGRAM(s) Oral every 6 hours PRN Nausea and/or Vomiting  oxyCODONE    5 mG/acetaminophen 325 mG 1 Tablet(s) Oral every 6 hours PRN Moderate Pain (4 - 6)        ROS: all systems reviewed and wnl      PHYSICAL EXAMINATION:  Vital Signs Last 24 Hrs  T(C): 36.4 (04 Oct 2017 05:26), Max: 37.3 (03 Oct 2017 17:14)  T(F): 97.6 (04 Oct 2017 05:26), Max: 99.1 (03 Oct 2017 17:14)  HR: 82 (04 Oct 2017 05:26) (77 - 99)  BP: 137/74 (04 Oct 2017 05:26) (66/42 - 138/72)  BP(mean): --  RR: 22 (04 Oct 2017 05:26) (16 - 24)  SpO2: 100% (04 Oct 2017 05:26) (88% - 100%)  CAPILLARY BLOOD GLUCOSE  236 (04 Oct 2017 04:19)          10- @ 07:01  -  10-04 @ 11:07  --------------------------------------------------------  IN: 0 mL / OUT: 0 mL / NET: 0 mL        GENERAL:   NECK: supple, No JVD  CHEST/LUNG: clear to auscultation bilaterally; no rales, rhonchi, or wheezing b/l  HEART: normal S1, S2  ABDOMEN: BS+, soft, ND, NT   EXTREMITIES:  pulses palpable; no clubbing, cyanosis, or edema b/l LEs  NEURO: awake, alert, interactive; moves all extremities  SKIN: no rashes or lesions      LABS:                        9.0    9.8   )-----------( 234      ( 03 Oct 2017 17:33 )             26.2     10-04    131<L>  |  92<L>  |  86<H>  ----------------------------<  162<H>  6.2<HH>   |  20<L>  |  1.66<H>    Ca    10.2      04 Oct 2017 01:50    TPro  7.1  /  Alb  3.4  /  TBili  0.2  /  DBili  x   /  AST  43<H>  /  ALT  17  /  AlkPhos  81  10-03    PT/INR - ( 03 Oct 2017 17:33 )   PT: 11.0 sec;   INR: 1.02 ratio         PTT - ( 03 Oct 2017 17:33 )  PTT:29.0 sec  Urinalysis Basic - ( 03 Oct 2017 18:48 )    Color: Yellow / Appearance: Turbid / S.017 / pH: x  Gluc: x / Ketone: Negative  / Bili: Negative / Urobili: 1   Blood: x / Protein: 30 mg/dL / Nitrite: Negative   Leuk Esterase: Large / RBC: 0-2 /HPF / WBC >50 /HPF   Sq Epi: x / Non Sq Epi: x / Bacteria: x CC/F/U for: Dehydration and hyperkalemia    INTERVAL HPI/OVERNIGHT EVENTS:  Pt seen and examined at bedside.     Allergies/Intolerance: Toradol (Urticaria (Mild to Mod); Rash (Mild to Mod))      MEDICATIONS  (STANDING):  ALBUTerol    0.083% 2.5 milliGRAM(s) Nebulizer every 6 hours  allopurinol 300 milliGRAM(s) Oral daily  amLODIPine   Tablet 10 milliGRAM(s) Oral daily  cefTRIAXone   IVPB 1 Gram(s) IV Intermittent every 24 hours  citalopram 40 milliGRAM(s) Oral daily  famotidine    Tablet 20 milliGRAM(s) Oral daily  folic acid 1 milliGRAM(s) Oral daily  levETIRAcetam  Solution 750 milliGRAM(s) Oral two times a day  levothyroxine 50 MICROGram(s) Oral daily  metoprolol 25 milliGRAM(s) Oral two times a day  oxyCODONE    IR 5 milliGRAM(s) Oral once  pantoprazole   Suspension 40 milliGRAM(s) Oral before breakfast  sodium chloride 0.9% Bolus 500 milliLiter(s) IV Bolus once  sodium chloride 0.9%. 1000 milliLiter(s) (100 mL/Hr) IV Continuous <Continuous>    MEDICATIONS  (PRN):  ondansetron    Tablet 4 milliGRAM(s) Oral every 6 hours PRN Nausea and/or Vomiting  oxyCODONE    5 mG/acetaminophen 325 mG 1 Tablet(s) Oral every 6 hours PRN Moderate Pain (4 - 6)        ROS: all systems reviewed and wnl      PHYSICAL EXAMINATION:  Vital Signs Last 24 Hrs  T(C): 36.4 (04 Oct 2017 05:26), Max: 37.3 (03 Oct 2017 17:14)  T(F): 97.6 (04 Oct 2017 05:26), Max: 99.1 (03 Oct 2017 17:14)  HR: 82 (04 Oct 2017 05:26) (77 - 99)  BP: 137/74 (04 Oct 2017 05:26) (66/42 - 138/72)  BP(mean): --  RR: 22 (04 Oct 2017 05:26) (16 - 24)  SpO2: 100% (04 Oct 2017 05:26) (88% - 100%)  CAPILLARY BLOOD GLUCOSE  236 (04 Oct 2017 04:19)          10- @ 07:01  -  10-04 @ 11:07  --------------------------------------------------------  IN: 0 mL / OUT: 0 mL / NET: 0 mL        GENERAL: alert, comfortable, no SOB, no cough or fevers  NECK: supple, No JVD  CHEST/LUNG: clear to auscultation bilaterally; no rales, rhonchi, or wheezing b/l  HEART: normal S1, S2  ABDOMEN: BS+, soft, ND, NT   EXTREMITIES:  pulses palpable; no clubbing, cyanosis, or edema b/l LEs  NEURO: awake, alert, interactive; moves all extremities  SKIN: no rashes or lesions      LABS:                        9.0    9.8   )-----------( 234      ( 03 Oct 2017 17:33 )             26.2     10-04    131<L>  |  92<L>  |  86<H>  ----------------------------<  162<H>  6.2<HH>   |  20<L>  |  1.66<H>    Ca    10.2      04 Oct 2017 01:50    TPro  7.1  /  Alb  3.4  /  TBili  0.2  /  DBili  x   /  AST  43<H>  /  ALT  17  /  AlkPhos  81  10-03    PT/INR - ( 03 Oct 2017 17:33 )   PT: 11.0 sec;   INR: 1.02 ratio         PTT - ( 03 Oct 2017 17:33 )  PTT:29.0 sec  Urinalysis Basic - ( 03 Oct 2017 18:48 )    Color: Yellow / Appearance: Turbid / S.017 / pH: x  Gluc: x / Ketone: Negative  / Bili: Negative / Urobili: 1   Blood: x / Protein: 30 mg/dL / Nitrite: Negative   Leuk Esterase: Large / RBC: 0-2 /HPF / WBC >50 /HPF   Sq Epi: x / Non Sq Epi: x / Bacteria: x

## 2017-10-04 NOTE — DIETITIAN INITIAL EVALUATION ADULT. - ENERGY NEEDS
ht: 63 inches. wt: 187.3 pounds (current, bed scale, semi-chronic B/L LE edema noted). BMI: 33.2 kG/m2. UBW: 180 pounds. IBW: 115 pounds +/- 10%. %IBW: 163 pounds.  Other pertinent objective information: 82 year old female pt with PMH recent hemorrhagic CVA (residual dysphagia s/p PEG), HTN, MI, ETOH, HLD, gout p/w SOB and reported respiratory distress, found to have hyperkalemia, DOMINGO of unclear etiology. UTI noted. No pressure ulcers.

## 2017-10-04 NOTE — DIETITIAN INITIAL EVALUATION ADULT. - NS AS NUTRI INTERV ENTERAL NUTRITION
In setting of hyperkalemia/DOMINGO recommend reduced K+ renal formula Suplena as follows: Suplena initiate at 20 ml/hr advance as tolerated by 10 ml/hr to goal of 60 ml/hr x 16 hours to provide the following: Calories: 1723 kcal (21 kcal/kG UBW 82 kG); Protein: 43 grams (0.5 grams/kG); Free H2O: 1908 ml (with added 200 ml H2O flush Q4 hours). Given that Suplena is a low protein formula upon resolution of DOMINGO would recommend switching back to original formula Jevity once hyperkalemia/DOMINGO resolves. Monitor TF tolerance, wts, skin.

## 2017-10-04 NOTE — CONSULT NOTE ADULT - ASSESSMENT
sob/tachycardia r/o pe  check v/q scan/doppler  ivf  check bladder ultrasound r/o urinary obstruction  will review echo from before/check chest xray

## 2017-10-05 DIAGNOSIS — R69 ILLNESS, UNSPECIFIED: ICD-10-CM

## 2017-10-05 LAB
-  AMIKACIN: SIGNIFICANT CHANGE UP
-  AMPICILLIN/SULBACTAM: SIGNIFICANT CHANGE UP
-  AMPICILLIN: SIGNIFICANT CHANGE UP
-  AZTREONAM: SIGNIFICANT CHANGE UP
-  CEFAZOLIN: SIGNIFICANT CHANGE UP
-  CEFEPIME: SIGNIFICANT CHANGE UP
-  CEFOXITIN: SIGNIFICANT CHANGE UP
-  CEFTAZIDIME: SIGNIFICANT CHANGE UP
-  CEFTRIAXONE: SIGNIFICANT CHANGE UP
-  CIPROFLOXACIN: SIGNIFICANT CHANGE UP
-  ERTAPENEM: SIGNIFICANT CHANGE UP
-  GENTAMICIN: SIGNIFICANT CHANGE UP
-  IMIPENEM: SIGNIFICANT CHANGE UP
-  LEVOFLOXACIN: SIGNIFICANT CHANGE UP
-  MEROPENEM: SIGNIFICANT CHANGE UP
-  NITROFURANTOIN: SIGNIFICANT CHANGE UP
-  PIPERACILLIN/TAZOBACTAM: SIGNIFICANT CHANGE UP
-  TOBRAMYCIN: SIGNIFICANT CHANGE UP
-  TRIMETHOPRIM/SULFAMETHOXAZOLE: SIGNIFICANT CHANGE UP
ANION GAP SERPL CALC-SCNC: 14 MMOL/L — SIGNIFICANT CHANGE UP (ref 5–17)
BUN SERPL-MCNC: 47 MG/DL — HIGH (ref 7–23)
CALCIUM SERPL-MCNC: 9.6 MG/DL — SIGNIFICANT CHANGE UP (ref 8.4–10.5)
CHLORIDE SERPL-SCNC: 101 MMOL/L — SIGNIFICANT CHANGE UP (ref 96–108)
CO2 SERPL-SCNC: 27 MMOL/L — SIGNIFICANT CHANGE UP (ref 22–31)
CREAT SERPL-MCNC: 0.82 MG/DL — SIGNIFICANT CHANGE UP (ref 0.5–1.3)
CULTURE RESULTS: SIGNIFICANT CHANGE UP
GLUCOSE SERPL-MCNC: 131 MG/DL — HIGH (ref 70–99)
HCT VFR BLD CALC: 27.2 % — LOW (ref 34.5–45)
HGB BLD-MCNC: 9.4 G/DL — LOW (ref 11.5–15.5)
MCHC RBC-ENTMCNC: 34.6 GM/DL — SIGNIFICANT CHANGE UP (ref 32–36)
MCHC RBC-ENTMCNC: 36.2 PG — HIGH (ref 27–34)
MCV RBC AUTO: 105 FL — HIGH (ref 80–100)
METHOD TYPE: SIGNIFICANT CHANGE UP
ORGANISM # SPEC MICROSCOPIC CNT: SIGNIFICANT CHANGE UP
ORGANISM # SPEC MICROSCOPIC CNT: SIGNIFICANT CHANGE UP
PLATELET # BLD AUTO: 219 K/UL — SIGNIFICANT CHANGE UP (ref 150–400)
POTASSIUM SERPL-MCNC: 3.9 MMOL/L — SIGNIFICANT CHANGE UP (ref 3.5–5.3)
POTASSIUM SERPL-SCNC: 3.9 MMOL/L — SIGNIFICANT CHANGE UP (ref 3.5–5.3)
RBC # BLD: 2.6 M/UL — LOW (ref 3.8–5.2)
RBC # FLD: 12.9 % — SIGNIFICANT CHANGE UP (ref 10.3–14.5)
SODIUM SERPL-SCNC: 142 MMOL/L — SIGNIFICANT CHANGE UP (ref 135–145)
SPECIMEN SOURCE: SIGNIFICANT CHANGE UP
WBC # BLD: 10 K/UL — SIGNIFICANT CHANGE UP (ref 3.8–10.5)
WBC # FLD AUTO: 10 K/UL — SIGNIFICANT CHANGE UP (ref 3.8–10.5)

## 2017-10-05 PROCEDURE — 93970 EXTREMITY STUDY: CPT | Mod: 26

## 2017-10-05 RX ORDER — OXYCODONE HYDROCHLORIDE 5 MG/1
5 TABLET ORAL ONCE
Qty: 0 | Refills: 0 | Status: DISCONTINUED | OUTPATIENT
Start: 2017-10-05 | End: 2017-10-05

## 2017-10-05 RX ADMIN — CEFTRIAXONE 100 GRAM(S): 500 INJECTION, POWDER, FOR SOLUTION INTRAMUSCULAR; INTRAVENOUS at 05:45

## 2017-10-05 RX ADMIN — AMLODIPINE BESYLATE 10 MILLIGRAM(S): 2.5 TABLET ORAL at 05:45

## 2017-10-05 RX ADMIN — OXYCODONE AND ACETAMINOPHEN 1 TABLET(S): 5; 325 TABLET ORAL at 20:05

## 2017-10-05 RX ADMIN — FAMOTIDINE 20 MILLIGRAM(S): 10 INJECTION INTRAVENOUS at 13:44

## 2017-10-05 RX ADMIN — OXYCODONE HYDROCHLORIDE 5 MILLIGRAM(S): 5 TABLET ORAL at 23:31

## 2017-10-05 RX ADMIN — PANTOPRAZOLE SODIUM 40 MILLIGRAM(S): 20 TABLET, DELAYED RELEASE ORAL at 05:46

## 2017-10-05 RX ADMIN — Medication 50 MICROGRAM(S): at 05:45

## 2017-10-05 RX ADMIN — OXYCODONE AND ACETAMINOPHEN 1 TABLET(S): 5; 325 TABLET ORAL at 14:15

## 2017-10-05 RX ADMIN — ALBUTEROL 2.5 MILLIGRAM(S): 90 AEROSOL, METERED ORAL at 13:43

## 2017-10-05 RX ADMIN — OXYCODONE AND ACETAMINOPHEN 1 TABLET(S): 5; 325 TABLET ORAL at 21:00

## 2017-10-05 RX ADMIN — ALBUTEROL 2.5 MILLIGRAM(S): 90 AEROSOL, METERED ORAL at 17:35

## 2017-10-05 RX ADMIN — LEVETIRACETAM 750 MILLIGRAM(S): 250 TABLET, FILM COATED ORAL at 05:46

## 2017-10-05 RX ADMIN — LEVETIRACETAM 750 MILLIGRAM(S): 250 TABLET, FILM COATED ORAL at 17:35

## 2017-10-05 RX ADMIN — Medication 25 MILLIGRAM(S): at 17:36

## 2017-10-05 RX ADMIN — CITALOPRAM 40 MILLIGRAM(S): 10 TABLET, FILM COATED ORAL at 13:44

## 2017-10-05 RX ADMIN — Medication 1 MILLIGRAM(S): at 13:44

## 2017-10-05 RX ADMIN — ALBUTEROL 2.5 MILLIGRAM(S): 90 AEROSOL, METERED ORAL at 23:31

## 2017-10-05 RX ADMIN — ALBUTEROL 2.5 MILLIGRAM(S): 90 AEROSOL, METERED ORAL at 05:45

## 2017-10-05 RX ADMIN — Medication 25 MILLIGRAM(S): at 05:45

## 2017-10-05 RX ADMIN — Medication 300 MILLIGRAM(S): at 13:43

## 2017-10-05 RX ADMIN — OXYCODONE AND ACETAMINOPHEN 1 TABLET(S): 5; 325 TABLET ORAL at 13:46

## 2017-10-05 NOTE — PROGRESS NOTE ADULT - ASSESSMENT
82 year old female PMH recent hemorrhagic CVA, PEG, HTN, MI, ETOH abuse, HLD, gout p/w SOB and reported respiratory distress. Pt states she has felt generally in her baseline state of health when today her MD at rehab stated she appeared to have difficulty breathing and was "wheezing." Pt states she may have had a faint wheeze for a couple of days but was not sure as she felt relatively well. Pt was then taken to [facility] for further evaluation for SOB and was possibly hypoxic to the 80s based on rehab records. She states she has been at rehab since her CVA. Pt was reportedly given solumedrol, duonebs and Mg in the ER.    Renal: Repeat K  5.2. DOMINGO all resolved, creatinine 1.18. IVF stopped.. Stop ACE as this may cause hyperkalemia. CKD III stable. Stop free water flush via PEG.    Pulmonary:  V/Q is low prob. Continue duonebs every 6 hours.     CV: tele is NSR. Will stop telemetry. Continue Norvasc 10 mg/day and lopressor 25 mg bid. EKG is NSR.    Endo: Continue synthroid 50 mcg/day.    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     GI: continue PEG feeds as outlined in sunrise.       Discharge to rehab AM Friday. Eventual repeat MBS in rehab. Keep NPO for now.

## 2017-10-05 NOTE — PROGRESS NOTE ADULT - SUBJECTIVE AND OBJECTIVE BOX
CC/F/U for: SOB from bronchospasm    INTERVAL HPI/OVERNIGHT EVENTS:  Pt seen and examined at bedside.     Allergies/Intolerance: Toradol (Urticaria (Mild to Mod); Rash (Mild to Mod))      MEDICATIONS  (STANDING):  ALBUTerol    0.083% 2.5 milliGRAM(s) Nebulizer every 6 hours  allopurinol 300 milliGRAM(s) Oral daily  amLODIPine   Tablet 10 milliGRAM(s) Oral daily  citalopram 40 milliGRAM(s) Oral daily  famotidine    Tablet 20 milliGRAM(s) Oral daily  folic acid 1 milliGRAM(s) Oral daily  levETIRAcetam  Solution 750 milliGRAM(s) Oral two times a day  levothyroxine 50 MICROGram(s) Oral daily  metoprolol 25 milliGRAM(s) Oral two times a day  pantoprazole   Suspension 40 milliGRAM(s) Oral before breakfast    MEDICATIONS  (PRN):  oxyCODONE    5 mG/acetaminophen 325 mG 1 Tablet(s) Oral every 6 hours PRN Moderate Pain (4 - 6)        ROS: all systems reviewed and wnl      PHYSICAL EXAMINATION:  Vital Signs Last 24 Hrs  T(C): 36.6 (05 Oct 2017 05:15), Max: 36.6 (04 Oct 2017 12:24)  T(F): 97.8 (05 Oct 2017 05:15), Max: 97.9 (04 Oct 2017 12:24)  HR: 75 (05 Oct 2017 05:15) (75 - 99)  BP: 142/74 (05 Oct 2017 05:15) (124/61 - 142/74)  BP(mean): --  RR: 20 (05 Oct 2017 05:15) (20 - 20)  SpO2: 100% (05 Oct 2017 05:15) (97% - 100%)  CAPILLARY BLOOD GLUCOSE  124 (05 Oct 2017 04:26)  140 (04 Oct 2017 21:53)  167 (04 Oct 2017 17:15)          10-04 @ 07:  -  10-05 @ 07:00  --------------------------------------------------------  IN: 0 mL / OUT: 250 mL / NET: -250 mL    10-05 @ 07:01  -  10-05 @ 11:38  --------------------------------------------------------  IN: 0 mL / OUT: 0 mL / NET: 0 mL        GENERAL:   NECK: supple, No JVD  CHEST/LUNG: clear to auscultation bilaterally; no rales, rhonchi, or wheezing b/l  HEART: normal S1, S2  ABDOMEN: BS+, soft, ND, NT   EXTREMITIES:  pulses palpable; no clubbing, cyanosis, or edema b/l LEs  NEURO: awake, alert, interactive; moves all extremities  SKIN: no rashes or lesions      LABS:                        8.7    12.4  )-----------( 209      ( 04 Oct 2017 15:48 )             25.3     10-04    137  |  96  |  73<H>  ----------------------------<  156<H>  5.2   |  24  |  1.18    Ca    9.6      04 Oct 2017 15:48  Mg     2.7     10-    TPro  7.1  /  Alb  3.4  /  TBili  0.2  /  DBili  x   /  AST  43<H>  /  ALT  17  /  AlkPhos  81  10-03    PT/INR - ( 03 Oct 2017 17:33 )   PT: 11.0 sec;   INR: 1.02 ratio         PTT - ( 03 Oct 2017 17:33 )  PTT:29.0 sec  Urinalysis Basic - ( 03 Oct 2017 18:48 )    Color: Yellow / Appearance: Turbid / S.017 / pH: x  Gluc: x / Ketone: Negative  / Bili: Negative / Urobili: 1   Blood: x / Protein: 30 mg/dL / Nitrite: Negative   Leuk Esterase: Large / RBC: 0-2 /HPF / WBC >50 /HPF   Sq Epi: x / Non Sq Epi: x / Bacteria: x CC/F/U for: SOB from bronchospasm    INTERVAL HPI/OVERNIGHT EVENTS:  Pt seen and examined at bedside.     Allergies/Intolerance: Toradol (Urticaria (Mild to Mod); Rash (Mild to Mod))      MEDICATIONS  (STANDING):  ALBUTerol    0.083% 2.5 milliGRAM(s) Nebulizer every 6 hours  allopurinol 300 milliGRAM(s) Oral daily  amLODIPine   Tablet 10 milliGRAM(s) Oral daily  citalopram 40 milliGRAM(s) Oral daily  famotidine    Tablet 20 milliGRAM(s) Oral daily  folic acid 1 milliGRAM(s) Oral daily  levETIRAcetam  Solution 750 milliGRAM(s) Oral two times a day  levothyroxine 50 MICROGram(s) Oral daily  metoprolol 25 milliGRAM(s) Oral two times a day  pantoprazole   Suspension 40 milliGRAM(s) Oral before breakfast    MEDICATIONS  (PRN):  oxyCODONE    5 mG/acetaminophen 325 mG 1 Tablet(s) Oral every 6 hours PRN Moderate Pain (4 - 6)        ROS: all systems reviewed and wnl      PHYSICAL EXAMINATION:  Vital Signs Last 24 Hrs  T(C): 36.6 (05 Oct 2017 05:15), Max: 36.6 (04 Oct 2017 12:24)  T(F): 97.8 (05 Oct 2017 05:15), Max: 97.9 (04 Oct 2017 12:24)  HR: 75 (05 Oct 2017 05:15) (75 - 99)  BP: 142/74 (05 Oct 2017 05:15) (124/61 - 142/74)  BP(mean): --  RR: 20 (05 Oct 2017 05:15) (20 - 20)  SpO2: 100% (05 Oct 2017 05:15) (97% - 100%)  CAPILLARY BLOOD GLUCOSE  124 (05 Oct 2017 04:26)  140 (04 Oct 2017 21:53)  167 (04 Oct 2017 17:15)          10-04 @ 07:  -  10-05 @ 07:00  --------------------------------------------------------  IN: 0 mL / OUT: 250 mL / NET: -250 mL    10-05 @ 07:01  -  10-05 @ 11:38  --------------------------------------------------------  IN: 0 mL / OUT: 0 mL / NET: 0 mL        GENERAL: stable in bed, PEG feeds working well, no SOB, cough or fevers. Talks well without difficulty.  NECK: supple, No JVD  CHEST/LUNG: clear to auscultation bilaterally; no rales, rhonchi, or wheezing b/l  HEART: normal S1, S2  ABDOMEN: BS+, soft, ND, NT. PEG tube in place.    EXTREMITIES:  pulses palpable; no clubbing, cyanosis, or edema b/l LEs  NEURO: awake, alert, interactive; moves all extremities  SKIN: no rashes or lesions      LABS:                        8.7    12.4  )-----------( 209      ( 04 Oct 2017 15:48 )             25.3     10-    137  |  96  |  73<H>  ----------------------------<  156<H>  5.2   |  24  |  1.18    Ca    9.6      04 Oct 2017 15:48  Mg     2.7     10-    TPro  7.1  /  Alb  3.4  /  TBili  0.2  /  DBili  x   /  AST  43<H>  /  ALT  17  /  AlkPhos  81  10-03    PT/INR - ( 03 Oct 2017 17:33 )   PT: 11.0 sec;   INR: 1.02 ratio         PTT - ( 03 Oct 2017 17:33 )  PTT:29.0 sec  Urinalysis Basic - ( 03 Oct 2017 18:48 )    Color: Yellow / Appearance: Turbid / S.017 / pH: x  Gluc: x / Ketone: Negative  / Bili: Negative / Urobili: 1   Blood: x / Protein: 30 mg/dL / Nitrite: Negative   Leuk Esterase: Large / RBC: 0-2 /HPF / WBC >50 /HPF   Sq Epi: x / Non Sq Epi: x / Bacteria: x

## 2017-10-05 NOTE — PHYSICAL THERAPY INITIAL EVALUATION ADULT - ADDITIONAL COMMENTS
82 year old female who PTA was at [facility] 2/2 having hemoragic stroke in June. Prior to that Pt was living in a private house with aide and son. reports few steps and was ambulating with a cane at that time and was indpendent with her mobility. Since the CVA, Pt functional mobility has declined and she requires assistance with all functional mobility and all OOB activities and was receiving therapy at the rehab [facility] .

## 2017-10-05 NOTE — PHYSICAL THERAPY INITIAL EVALUATION ADULT - ACTIVE RANGE OF MOTION EXAMINATION, REHAB EVAL
deficits as listed below/L LE and L UE limited to 1/4 avaiable ROM grossly/Right UE Active ROM was WFL (within functional limits)/Right LE Active ROM was WFL (within functional limits)

## 2017-10-05 NOTE — PHYSICAL THERAPY INITIAL EVALUATION ADULT - RANGE OF MOTION EXAMINATION, REHAB EVAL
deficits as listed below/Right UE ROM was WFL (within functional limits)/L LE and L UE limited to 1/4 avaiable ROM grossly/Right LE ROM was WFL (within functional limits)

## 2017-10-05 NOTE — PHYSICAL THERAPY INITIAL EVALUATION ADULT - CRITERIA FOR SKILLED THERAPEUTIC INTERVENTIONS
predicted duration of therapy intervention/impairments found/risk reduction/prevention/therapy frequency/anticipated equipment needs at discharge/functional limitations in following categories/rehab potential/anticipated discharge recommendation

## 2017-10-05 NOTE — PHYSICAL THERAPY INITIAL EVALUATION ADULT - PERTINENT HX OF CURRENT PROBLEM, REHAB EVAL
82 year old female PMH recent hemorrhagic CVA, PEG, HTN, MI, ETOH abuse, HLD, gout p/w SOB and reported respiratory distress. Pt states she has felt generally in her baseline state of health when today her MD at rehab stated she appeared to have difficulty breathing and was "wheezing." Pt states she may have had a faint wheeze for a couple of days but was not sure as she felt relatively well. found to have hyperkalemia

## 2017-10-06 ENCOUNTER — TRANSCRIPTION ENCOUNTER (OUTPATIENT)
Age: 82
End: 2017-10-06

## 2017-10-06 VITALS — HEART RATE: 89 BPM | SYSTOLIC BLOOD PRESSURE: 117 MMHG | DIASTOLIC BLOOD PRESSURE: 84 MMHG

## 2017-10-06 PROCEDURE — 82803 BLOOD GASES ANY COMBINATION: CPT

## 2017-10-06 PROCEDURE — 82550 ASSAY OF CK (CPK): CPT

## 2017-10-06 PROCEDURE — 87186 SC STD MICRODIL/AGAR DIL: CPT

## 2017-10-06 PROCEDURE — 82553 CREATINE MB FRACTION: CPT

## 2017-10-06 PROCEDURE — 83735 ASSAY OF MAGNESIUM: CPT

## 2017-10-06 PROCEDURE — 82435 ASSAY OF BLOOD CHLORIDE: CPT

## 2017-10-06 PROCEDURE — 85014 HEMATOCRIT: CPT

## 2017-10-06 PROCEDURE — 85610 PROTHROMBIN TIME: CPT

## 2017-10-06 PROCEDURE — 87086 URINE CULTURE/COLONY COUNT: CPT

## 2017-10-06 PROCEDURE — 85027 COMPLETE CBC AUTOMATED: CPT

## 2017-10-06 PROCEDURE — 87040 BLOOD CULTURE FOR BACTERIA: CPT

## 2017-10-06 PROCEDURE — 90686 IIV4 VACC NO PRSV 0.5 ML IM: CPT

## 2017-10-06 PROCEDURE — A9540: CPT

## 2017-10-06 PROCEDURE — 78582 LUNG VENTILAT&PERFUS IMAGING: CPT

## 2017-10-06 PROCEDURE — 96376 TX/PRO/DX INJ SAME DRUG ADON: CPT

## 2017-10-06 PROCEDURE — A9567: CPT

## 2017-10-06 PROCEDURE — 84484 ASSAY OF TROPONIN QUANT: CPT

## 2017-10-06 PROCEDURE — 83605 ASSAY OF LACTIC ACID: CPT

## 2017-10-06 PROCEDURE — 94640 AIRWAY INHALATION TREATMENT: CPT

## 2017-10-06 PROCEDURE — 96374 THER/PROPH/DIAG INJ IV PUSH: CPT

## 2017-10-06 PROCEDURE — 85730 THROMBOPLASTIN TIME PARTIAL: CPT

## 2017-10-06 PROCEDURE — 82330 ASSAY OF CALCIUM: CPT

## 2017-10-06 PROCEDURE — 80053 COMPREHEN METABOLIC PANEL: CPT

## 2017-10-06 PROCEDURE — 81001 URINALYSIS AUTO W/SCOPE: CPT

## 2017-10-06 PROCEDURE — 71045 X-RAY EXAM CHEST 1 VIEW: CPT

## 2017-10-06 PROCEDURE — 97162 PT EVAL MOD COMPLEX 30 MIN: CPT

## 2017-10-06 PROCEDURE — 84295 ASSAY OF SERUM SODIUM: CPT

## 2017-10-06 PROCEDURE — 96375 TX/PRO/DX INJ NEW DRUG ADDON: CPT

## 2017-10-06 PROCEDURE — 84132 ASSAY OF SERUM POTASSIUM: CPT

## 2017-10-06 PROCEDURE — 93005 ELECTROCARDIOGRAM TRACING: CPT

## 2017-10-06 PROCEDURE — 80048 BASIC METABOLIC PNL TOTAL CA: CPT

## 2017-10-06 PROCEDURE — 82947 ASSAY GLUCOSE BLOOD QUANT: CPT

## 2017-10-06 PROCEDURE — 93970 EXTREMITY STUDY: CPT

## 2017-10-06 PROCEDURE — 99285 EMERGENCY DEPT VISIT HI MDM: CPT | Mod: 25

## 2017-10-06 RX ORDER — CIPROFLOXACIN LACTATE 400MG/40ML
250 VIAL (ML) INTRAVENOUS
Qty: 0 | Refills: 0 | Status: DISCONTINUED | OUTPATIENT
Start: 2017-10-06 | End: 2017-10-06

## 2017-10-06 RX ORDER — METOPROLOL TARTRATE 50 MG
1 TABLET ORAL
Qty: 0 | Refills: 0 | COMMUNITY

## 2017-10-06 RX ORDER — CEFUROXIME AXETIL 250 MG
1 TABLET ORAL
Qty: 0 | Refills: 0 | COMMUNITY
Start: 2017-10-06

## 2017-10-06 RX ORDER — FOLIC ACID 0.8 MG
1 TABLET ORAL
Qty: 0 | Refills: 0 | COMMUNITY
Start: 2017-10-06

## 2017-10-06 RX ORDER — AMLODIPINE BESYLATE 2.5 MG/1
1 TABLET ORAL
Qty: 0 | Refills: 0 | COMMUNITY

## 2017-10-06 RX ORDER — ALLOPURINOL 300 MG
1 TABLET ORAL
Qty: 0 | Refills: 0 | COMMUNITY

## 2017-10-06 RX ORDER — CITALOPRAM 10 MG/1
1 TABLET, FILM COATED ORAL
Qty: 0 | Refills: 0 | COMMUNITY

## 2017-10-06 RX ORDER — LEVOTHYROXINE SODIUM 125 MCG
1 TABLET ORAL
Qty: 0 | Refills: 0 | COMMUNITY

## 2017-10-06 RX ORDER — ALBUTEROL 90 UG/1
1 AEROSOL, METERED ORAL
Qty: 0 | Refills: 0 | COMMUNITY
Start: 2017-10-06

## 2017-10-06 RX ORDER — CEFUROXIME AXETIL 250 MG
250 TABLET ORAL EVERY 12 HOURS
Qty: 0 | Refills: 0 | Status: DISCONTINUED | OUTPATIENT
Start: 2017-10-06 | End: 2017-10-06

## 2017-10-06 RX ORDER — LEVETIRACETAM 250 MG/1
7.5 TABLET, FILM COATED ORAL
Qty: 0 | Refills: 0 | COMMUNITY

## 2017-10-06 RX ORDER — GABAPENTIN 400 MG/1
1 CAPSULE ORAL
Qty: 0 | Refills: 0 | COMMUNITY

## 2017-10-06 RX ORDER — PANTOPRAZOLE SODIUM 20 MG/1
1 TABLET, DELAYED RELEASE ORAL
Qty: 0 | Refills: 0 | COMMUNITY
Start: 2017-10-06

## 2017-10-06 RX ORDER — AMLODIPINE BESYLATE 2.5 MG/1
1 TABLET ORAL
Qty: 0 | Refills: 0 | COMMUNITY
Start: 2017-10-06

## 2017-10-06 RX ORDER — METOPROLOL TARTRATE 50 MG
1 TABLET ORAL
Qty: 0 | Refills: 0 | COMMUNITY
Start: 2017-10-06

## 2017-10-06 RX ORDER — LEVOTHYROXINE SODIUM 125 MCG
1 TABLET ORAL
Qty: 0 | Refills: 0 | COMMUNITY
Start: 2017-10-06

## 2017-10-06 RX ORDER — OXYCODONE HYDROCHLORIDE 5 MG/1
1 TABLET ORAL
Qty: 0 | Refills: 0 | COMMUNITY

## 2017-10-06 RX ORDER — LEVETIRACETAM 250 MG/1
7.5 TABLET, FILM COATED ORAL
Qty: 0 | Refills: 0 | COMMUNITY
Start: 2017-10-06

## 2017-10-06 RX ORDER — FAMOTIDINE 10 MG/ML
1 INJECTION INTRAVENOUS
Qty: 0 | Refills: 0 | COMMUNITY
Start: 2017-10-06

## 2017-10-06 RX ORDER — ALLOPURINOL 300 MG
1 TABLET ORAL
Qty: 0 | Refills: 0 | COMMUNITY
Start: 2017-10-06

## 2017-10-06 RX ORDER — CITALOPRAM 10 MG/1
1 TABLET, FILM COATED ORAL
Qty: 0 | Refills: 0 | COMMUNITY
Start: 2017-10-06

## 2017-10-06 RX ORDER — INFLUENZA VIRUS VACCINE 15; 15; 15; 15 UG/.5ML; UG/.5ML; UG/.5ML; UG/.5ML
0.5 SUSPENSION INTRAMUSCULAR ONCE
Qty: 0 | Refills: 0 | Status: COMPLETED | OUTPATIENT
Start: 2017-10-06 | End: 2017-10-06

## 2017-10-06 RX ADMIN — ALBUTEROL 2.5 MILLIGRAM(S): 90 AEROSOL, METERED ORAL at 12:22

## 2017-10-06 RX ADMIN — AMLODIPINE BESYLATE 10 MILLIGRAM(S): 2.5 TABLET ORAL at 05:21

## 2017-10-06 RX ADMIN — OXYCODONE AND ACETAMINOPHEN 1 TABLET(S): 5; 325 TABLET ORAL at 06:30

## 2017-10-06 RX ADMIN — ALBUTEROL 2.5 MILLIGRAM(S): 90 AEROSOL, METERED ORAL at 05:22

## 2017-10-06 RX ADMIN — OXYCODONE AND ACETAMINOPHEN 1 TABLET(S): 5; 325 TABLET ORAL at 12:19

## 2017-10-06 RX ADMIN — LEVETIRACETAM 750 MILLIGRAM(S): 250 TABLET, FILM COATED ORAL at 05:21

## 2017-10-06 RX ADMIN — Medication 300 MILLIGRAM(S): at 12:21

## 2017-10-06 RX ADMIN — FAMOTIDINE 20 MILLIGRAM(S): 10 INJECTION INTRAVENOUS at 12:21

## 2017-10-06 RX ADMIN — OXYCODONE HYDROCHLORIDE 5 MILLIGRAM(S): 5 TABLET ORAL at 00:15

## 2017-10-06 RX ADMIN — Medication 1 MILLIGRAM(S): at 12:21

## 2017-10-06 RX ADMIN — INFLUENZA VIRUS VACCINE 0.5 MILLILITER(S): 15; 15; 15; 15 SUSPENSION INTRAMUSCULAR at 14:55

## 2017-10-06 RX ADMIN — Medication 50 MICROGRAM(S): at 05:21

## 2017-10-06 RX ADMIN — PANTOPRAZOLE SODIUM 40 MILLIGRAM(S): 20 TABLET, DELAYED RELEASE ORAL at 05:22

## 2017-10-06 RX ADMIN — OXYCODONE AND ACETAMINOPHEN 1 TABLET(S): 5; 325 TABLET ORAL at 05:28

## 2017-10-06 RX ADMIN — Medication 25 MILLIGRAM(S): at 05:21

## 2017-10-06 RX ADMIN — CITALOPRAM 40 MILLIGRAM(S): 10 TABLET, FILM COATED ORAL at 12:21

## 2017-10-06 NOTE — DISCHARGE NOTE ADULT - MEDICATION SUMMARY - MEDICATIONS TO STOP TAKING
I will STOP taking the medications listed below when I get home from the hospital:    lisinopril 40 mg oral tablet  -- 1 tab(s) by mouth once a day    ondansetron 4 mg oral tablet  -- 1 tab(s) by mouth every 6 hours, As needed, Nausea and/or Vomiting    nystatin 100,000 units/g topical cream  -- 1 application on skin every 8 hours    LORazepam 1 mg oral tablet  -- 1 tab(s) by mouth every 8 hours, As needed, Agitation    gabapentin 300 mg oral tablet  -- 1 tab(s) by mouth 3 times a day

## 2017-10-06 NOTE — DISCHARGE NOTE ADULT - SECONDARY DIAGNOSIS.
CVA (cerebral vascular accident) Urinary tract infection without hematuria, site unspecified HTN (hypertension)

## 2017-10-06 NOTE — DISCHARGE NOTE ADULT - MEDICATION SUMMARY - MEDICATIONS TO TAKE
I will START or STAY ON the medications listed below when I get home from the hospital:    oxyCODONE-acetaminophen 5 mg-325 mg oral tablet  -- 1 tab(s) by mouth every 6 hours, As needed, Moderate Pain (4 - 6)  via PEG  -- Indication: For Pain    levETIRAcetam 100 mg/mL oral solution  -- 7.5 milliliter(s) by mouth 2 times a day  -- Indication: For Cerebrovascular accident (CVA), unspecified mechanism    citalopram 40 mg oral tablet  -- 1 tab(s) by mouth once a day  -- Indication: For Cerebrovascular accident (CVA), unspecified mechanism    allopurinol 300 mg oral tablet  -- 1 tab(s) by mouth once a day  -- Indication: For Gout, unspecified cause, unspecified chronicity, unspecified site    metoprolol tartrate 25 mg oral tablet  -- 1 tab(s) by mouth 2 times a day  -- Indication: For Essential hypertension    albuterol 2.5 mg/3 mL (0.083%) inhalation solution  -- 1 dose(s) inhaled every 6 hours  -- Indication: For Shortness of breath    amLODIPine 10 mg oral tablet  -- 1 tab(s) by mouth once a day  -- Indication: For HTN (hypertension)    cefuroxime 250 mg oral tablet  -- 1 tab(s) by mouth every 12 hours x3 days  via PEG  -- Indication: For Urinary tract infection without hematuria, site unspecified    famotidine 20 mg oral tablet  -- 1 tab(s) by mouth once a day  -- Indication: For Gerd    pantoprazole 40 mg oral granule, enteric coated  -- 1  by gastrostomy tube once a day  -- Indication: For Gerd    levothyroxine 50 mcg (0.05 mg) oral tablet  -- 1 tab(s) by mouth once a day  -- Indication: For Hypothyroid    folic acid 1 mg oral tablet  -- 1 tab(s) by mouth once a day  -- Indication: For vitamin

## 2017-10-06 NOTE — CHART NOTE - NSCHARTNOTEFT_GEN_A_CORE
Nutrition Consult/Interum Note    Pt seen for consult: chewing/swallowing difficulty. Pt currently being planned for dc to [facility] rehab.    82 year old female pt with PMH recent hemorrhagic CVA (residual dysphagia s/p PEG), HTN, MI, ETOH, HLD, gout p/w SOB and reported respiratory distress, found to have hyperkalemia, DOMINGO. Pt was placed on low K TF formula, treated with IVF, free H2O via PEG, DOMINGO now noted as resolved.    Due to pt's hx of CVA and dysphagia with prior SLP recommendation for NPO, continue enteral nutrition via PEG at this time. Pt can be re-evaluated by SLP at [facility] to assess potential for transition to po diet. If pt remains on enteral nutrition, pt may be transitioned back to Jevity formula/admission TF regimen to meet pt's estimated protein needs daily (Suplena is a low K+ formula but also lower protein formula).    Consult appreciated, RD remains available as needed: Ranjana Aguilar MS, RDN, CDN, CDE. #[phone]

## 2017-10-06 NOTE — DISCHARGE NOTE ADULT - CARE PROVIDER_API CALL
Edmond Almazan), Internal Medicine  [address] [facility] [facility]  [address]  [place] [address]  Phone: [phone]  Fax: [phone]

## 2017-10-06 NOTE — DISCHARGE NOTE ADULT - CARE PLAN
Principal Discharge DX:	Acute kidney injury  Goal:	c/c SOB resolved  Secondary Diagnosis:	CVA (cerebral vascular accident)  Secondary Diagnosis:	Urinary tract infection without hematuria, site unspecified  Secondary Diagnosis:	HTN (hypertension) Principal Discharge DX:	Acute kidney injury  Goal:	c/c SOB resolved; due to intolerance to ACEI/ARB  Instructions for follow-up, activity and diet:	NO ACRE/ARB  Secondary Diagnosis:	CVA (cerebral vascular accident)  Instructions for follow-up, activity and diet:	c/w Keppra  NPO with PEG feeds  Secondary Diagnosis:	Urinary tract infection without hematuria, site unspecified  Instructions for follow-up, activity and diet:	Ceftin 250mg BID x 3 days  Secondary Diagnosis:	HTN (hypertension)  Goal:	stable

## 2017-10-06 NOTE — DISCHARGE NOTE ADULT - PATIENT PORTAL LINK FT
“You can access the [employer] Patient Portal, offered by [facility], by registering with the following website: [email or web address]”

## 2017-10-06 NOTE — DISCHARGE NOTE ADULT - PLAN OF CARE
c/c FABIO resolved c/c SOB resolved; due to intolerance to ACEI/ARB NO ACRE/ARB c/w Keppra  NPO with PEG feeds Ceftin 250mg BID x 3 days stable

## 2017-10-06 NOTE — DISCHARGE NOTE ADULT - HOSPITAL COURSE
Assessment and Plan:   · Assessment		  82 year old female PMH recent hemorrhagic CVA, PEG, HTN, MI, ETOH abuse, HLD, gout p/w SOB and reported respiratory distress. Pt states she has felt generally in her baseline state of health when today her MD at rehab stated she appeared to have difficulty breathing and was "wheezing." Pt states she may have had a faint wheeze for a couple of days but was not sure as she felt relatively well. Pt was then taken to [facility] for further evaluation for SOB and was possibly hypoxic to the 80s based on rehab records. She states she has been at rehab since her CVA. Pt was reportedly given solumedrol, duonebs and Mg in the ER.    Renal: Repeat K  5.2. DOMINGO all resolved, creatinine 1.18. IVF stopped.. Stop ACE as this may cause hyperkalemia. CKD III stable. Stop free water flush via PEG.    Pulmonary:  V/Q is low prob. Continue duonebs every 6 hours.     CV: tele is NSR. Will stop telemetry. Continue Norvasc 10 mg/day and lopressor 25 mg bid. EKG is NSR.    Endo: Continue synthroid 50 mcg/day.    Neuro: Continue Kepra 750 mg bid for seizure prophylaxis and   UTI; ceftin 250mg BIDx 3 days    cleared for d/c to [facility] by Dr. Almazan 10/6/17 Assessment and Plan:   · Assessment		  82 year old female PMH recent hemorrhagic CVA, PEG placement for dysphagia after stroke, HTN, MI, ETOH abuse, HLD, gout p/w SOB and reported respiratory distress. Pt states she has felt generally in her baseline state of health when today her MD at rehab stated she appeared to have difficulty breathing and was "wheezing." Pt states she may have had a faint wheeze for a couple of days but was not sure as she felt relatively well. Pt was then taken to [facility] for further evaluation for SOB and was hypoxic upon arrival.     DOMINGO from dehydration upon arrival resolved by discharge with creatinine .76. IVF stopped. ACE was stopped as this may have caused hyperkalemia upon arrival. V/Q was low prob, CXR was entirely clear. EKG showed NSR. Was treated for E.coli UTI. SOB resolved with brief steroids and Duonebs.     Will continue Norvasc 10 mg/day and lopressor 25 mg bid and synthroid 50 mcg/day. 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 Ceftin 250mg BID x 3 more days. See attached med list. Discharged back to [facility] for continued rehab. She has made improvements and speech if fluent. Assessment and Plan:   · Assessment		  82 year old female PMH recent hemorrhagic CVA, PEG placement for dysphagia after stroke, HTN, MI, ETOH abuse, HLD, gout p/w SOB and reported respiratory distress. Pt states she has felt generally in her baseline state of health when today her MD at rehab stated she appeared to have difficulty breathing and was "wheezing." Pt states she may have had a faint wheeze for a couple of days but was not sure as she felt relatively well. Pt was then taken to [facility] for further evaluation for SOB and was hypoxic upon arrival.     DOMINGO from dehydration upon arrival resolved by discharge with creatinine .76. IVF stopped. ACE was stopped as this may have caused hyperkalemia upon arrival. V/Q was low prob, CXR was entirely clear. EKG showed NSR. Was treated for E.coli UTI. SOB resolved with brief steroids and Duonebs. No clear etiology for SOB was found.     Will continue Norvasc 10 mg/day and lopressor 25 mg bid and synthroid 50 mcg/day. 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 Ceftin 250mg BID x 3 more days. See attached med list.     Discharged back to [facility] for continued rehab. She has made improvements and speech is fluent. Eager to repeat MBS study. Currently NPO, all meds via PEG.

## 2017-10-06 NOTE — DISCHARGE NOTE ADULT - INSTRUCTIONS
PEG feeds; Suplena(carb steady)total volume; 960ml/24 hrs. Begin TF @ 35 and progress to goal of 60ml/hr NPO with PEG feeds; Suplena(carb steady)total volume; 960ml/24 hrs. Begin TF @ 35 and progress to goal of 60ml/hr

## 2017-10-06 NOTE — PROGRESS NOTE ADULT - ASSESSMENT
82 year old female PMH recent hemorrhagic CVA, PEG, HTN, MI, ETOH abuse, HLD, gout p/w SOB and reported respiratory distress. Pt states she has felt generally in her baseline state of health when today her MD at rehab stated she appeared to have difficulty breathing and was "wheezing." Pt states she may have had a faint wheeze for a couple of days but was not sure as she felt relatively well. Pt was then taken to [facility] for further evaluation for SOB and was possibly hypoxic to the 80s based on rehab records. She states she has been at rehab since her CVA. Pt was reportedly given solumedrol, duonebs and Mg in the ER.    Renal: Repeat K  3.9. DOMINGO all resolved, creatinine 1.18. IVF stopped.. Stop ACE/ARB as this may cause hyperkalemia. CKD III stable. Stop free water flush via PEG.    Pulmonary:  V/Q is low prob. Continue duonebs every 6 hours.     CV: tele is NSR. Will stop telemetry. Continue Norvasc 10 mg/day and lopressor 25 mg bid. EKG is NSR.    Endo: Continue synthroid 50 mcg/day.    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     GI: continue PEG feeds as outlined in sunrise.       Discharge to rehab AM Friday. Eventual repeat MBS in rehab. Keep NPO for now.     ID: Complete 3 more days of Ceftin for pansensitive E.coli in rehab.

## 2017-10-06 NOTE — PROGRESS NOTE ADULT - SUBJECTIVE AND OBJECTIVE BOX
CC/F/U for: SOB and atelectasis     INTERVAL HPI/OVERNIGHT EVENTS:  Pt seen and examined at bedside.     Allergies/Intolerance: Toradol (Urticaria (Mild to Mod); Rash (Mild to Mod))      MEDICATIONS  (STANDING):  ALBUTerol    0.083% 2.5 milliGRAM(s) Nebulizer every 6 hours  allopurinol 300 milliGRAM(s) Oral daily  amLODIPine   Tablet 10 milliGRAM(s) Oral daily  citalopram 40 milliGRAM(s) Oral daily  famotidine    Tablet 20 milliGRAM(s) Oral daily  folic acid 1 milliGRAM(s) Oral daily  levETIRAcetam  Solution 750 milliGRAM(s) Oral two times a day  levothyroxine 50 MICROGram(s) Oral daily  metoprolol 25 milliGRAM(s) Oral two times a day  pantoprazole   Suspension 40 milliGRAM(s) Oral before breakfast    MEDICATIONS  (PRN):  oxyCODONE    5 mG/acetaminophen 325 mG 1 Tablet(s) Oral every 6 hours PRN Moderate Pain (4 - 6)        ROS: all systems reviewed and wnl      PHYSICAL EXAMINATION:  Vital Signs Last 24 Hrs  T(C): 36.4 (06 Oct 2017 05:06), Max: 36.7 (05 Oct 2017 20:08)  T(F): 97.6 (06 Oct 2017 05:06), Max: 98.1 (05 Oct 2017 20:08)  HR: 75 (06 Oct 2017 05:06) (72 - 84)  BP: 135/74 (06 Oct 2017 05:06) (116/67 - 135/85)  BP(mean): --  RR: 22 (06 Oct 2017 05:06) (18 - 22)  SpO2: 95% (06 Oct 2017 05:06) (92% - 100%)  CAPILLARY BLOOD GLUCOSE  121 (06 Oct 2017 06:07)  104 (06 Oct 2017 00:40)  128 (05 Oct 2017 17:12)  75 (05 Oct 2017 14:09)          10-05 @ 07:01  -  10-06 @ 07:00  --------------------------------------------------------  IN: 400 mL / OUT: 840 mL / NET: -440 mL    10-06 @ 07:01  -  10-06 @ 10:50  --------------------------------------------------------  IN: 0 mL / OUT: 0 mL / NET: 0 mL        GENERAL:   NECK: supple, No JVD  CHEST/LUNG: clear to auscultation bilaterally; no rales, rhonchi, or wheezing b/l  HEART: normal S1, S2  ABDOMEN: BS+, soft, ND, NT   EXTREMITIES:  pulses palpable; no clubbing, cyanosis, or edema b/l LEs  NEURO: awake, alert, interactive; moves all extremities  SKIN: no rashes or lesions      LABS:                        9.4    10.0  )-----------( 219      ( 05 Oct 2017 11:14 )             27.2     10-05    142  |  101  |  47<H>  ----------------------------<  131<H>  3.9   |  27  |  0.82    Ca    9.6      05 Oct 2017 11:14  Mg     2.7     10-04 CC/F/U for: SOB and atelectasis     INTERVAL HPI/OVERNIGHT EVENTS:  Pt seen and examined at bedside.     Allergies/Intolerance: Toradol (Urticaria (Mild to Mod); Rash (Mild to Mod))      MEDICATIONS  (STANDING):  ALBUTerol    0.083% 2.5 milliGRAM(s) Nebulizer every 6 hours  allopurinol 300 milliGRAM(s) Oral daily  amLODIPine   Tablet 10 milliGRAM(s) Oral daily  citalopram 40 milliGRAM(s) Oral daily  famotidine    Tablet 20 milliGRAM(s) Oral daily  folic acid 1 milliGRAM(s) Oral daily  levETIRAcetam  Solution 750 milliGRAM(s) Oral two times a day  levothyroxine 50 MICROGram(s) Oral daily  metoprolol 25 milliGRAM(s) Oral two times a day  pantoprazole   Suspension 40 milliGRAM(s) Oral before breakfast    MEDICATIONS  (PRN):  oxyCODONE    5 mG/acetaminophen 325 mG 1 Tablet(s) Oral every 6 hours PRN Moderate Pain (4 - 6)        ROS: all systems reviewed and wnl      PHYSICAL EXAMINATION:  Vital Signs Last 24 Hrs  T(C): 36.4 (06 Oct 2017 05:06), Max: 36.7 (05 Oct 2017 20:08)  T(F): 97.6 (06 Oct 2017 05:06), Max: 98.1 (05 Oct 2017 20:08)  HR: 75 (06 Oct 2017 05:06) (72 - 84)  BP: 135/74 (06 Oct 2017 05:06) (116/67 - 135/85)  BP(mean): --  RR: 22 (06 Oct 2017 05:06) (18 - 22)  SpO2: 95% (06 Oct 2017 05:06) (92% - 100%)  CAPILLARY BLOOD GLUCOSE  121 (06 Oct 2017 06:07)  104 (06 Oct 2017 00:40)  128 (05 Oct 2017 17:12)  75 (05 Oct 2017 14:09)          10-05 @ 07:01  -  10-06 @ 07:00  --------------------------------------------------------  IN: 400 mL / OUT: 840 mL / NET: -440 mL    10-06 @ 07:01  -  10-06 @ 10:50  --------------------------------------------------------  IN: 0 mL / OUT: 0 mL / NET: 0 mL        GENERAL: stable in bed, PEG feeds in place, NPO for now.   NECK: supple, No JVD  CHEST/LUNG: clear to auscultation bilaterally; no rales, rhonchi, or wheezing b/l  HEART: normal S1, S2  ABDOMEN: BS+, soft, ND, NT   EXTREMITIES:  pulses palpable; no clubbing, cyanosis, or edema b/l LEs  NEURO: awake, alert, interactive; moves all extremities  SKIN: no rashes or lesions      LABS:                        9.4    10.0  )-----------( 219      ( 05 Oct 2017 11:14 )             27.2     10-05    142  |  101  |  47<H>  ----------------------------<  131<H>  3.9   |  27  |  0.82    Ca    9.6      05 Oct 2017 11:14  Mg     2.7     10-04

## 2017-10-09 LAB
CULTURE RESULTS: SIGNIFICANT CHANGE UP
CULTURE RESULTS: SIGNIFICANT CHANGE UP
SPECIMEN SOURCE: SIGNIFICANT CHANGE UP
SPECIMEN SOURCE: SIGNIFICANT CHANGE UP

## 2017-10-19 PROCEDURE — 97163 PT EVAL HIGH COMPLEX 45 MIN: CPT

## 2017-10-19 PROCEDURE — 93005 ELECTROCARDIOGRAM TRACING: CPT

## 2017-10-19 PROCEDURE — G8978: CPT

## 2017-10-19 PROCEDURE — 85610 PROTHROMBIN TIME: CPT

## 2017-10-19 PROCEDURE — 81001 URINALYSIS AUTO W/SCOPE: CPT

## 2017-10-19 PROCEDURE — 82330 ASSAY OF CALCIUM: CPT

## 2017-10-19 PROCEDURE — 84132 ASSAY OF SERUM POTASSIUM: CPT

## 2017-10-19 PROCEDURE — 83605 ASSAY OF LACTIC ACID: CPT

## 2017-10-19 PROCEDURE — 84443 ASSAY THYROID STIM HORMONE: CPT

## 2017-10-19 PROCEDURE — 70450 CT HEAD/BRAIN W/O DYE: CPT

## 2017-10-19 PROCEDURE — 85027 COMPLETE CBC AUTOMATED: CPT

## 2017-10-19 PROCEDURE — 85014 HEMATOCRIT: CPT

## 2017-10-19 PROCEDURE — 87086 URINE CULTURE/COLONY COUNT: CPT

## 2017-10-19 PROCEDURE — 82947 ASSAY GLUCOSE BLOOD QUANT: CPT

## 2017-10-19 PROCEDURE — 80053 COMPREHEN METABOLIC PANEL: CPT

## 2017-10-19 PROCEDURE — 80076 HEPATIC FUNCTION PANEL: CPT

## 2017-10-19 PROCEDURE — 82435 ASSAY OF BLOOD CHLORIDE: CPT

## 2017-10-19 PROCEDURE — 70551 MRI BRAIN STEM W/O DYE: CPT

## 2017-10-19 PROCEDURE — 71045 X-RAY EXAM CHEST 1 VIEW: CPT

## 2017-10-19 PROCEDURE — 80307 DRUG TEST PRSMV CHEM ANLYZR: CPT

## 2017-10-19 PROCEDURE — 82803 BLOOD GASES ANY COMBINATION: CPT

## 2017-10-19 PROCEDURE — 80048 BASIC METABOLIC PNL TOTAL CA: CPT

## 2017-10-19 PROCEDURE — 85730 THROMBOPLASTIN TIME PARTIAL: CPT

## 2017-10-19 PROCEDURE — 82565 ASSAY OF CREATININE: CPT

## 2017-10-19 PROCEDURE — 84295 ASSAY OF SERUM SODIUM: CPT

## 2017-10-19 PROCEDURE — 99285 EMERGENCY DEPT VISIT HI MDM: CPT | Mod: 25

## 2017-10-19 PROCEDURE — G8979: CPT

## 2017-11-07 NOTE — PROGRESS NOTE ADULT - MINUTES
36
36
42
45
35
no abdominal pain, no constipation, no diarrhea, no nausea and no vomiting.

## 2018-03-06 NOTE — H&P ADULT - NSHPPHYSICALEXAM_GEN_ALL_CORE
Addended Su Greenberg on: 3/6/2018 08:54 AM     Modules accepted: Orders
T(C): 2.6, Max: 2.6 (06-26 @ 13:47) T(F): 36.6, Max: 36.6 (06-26 @ 13:47)HR: 57 (57 - 57) BP: 149/82 (149/82 - 149/82)  RR: 16 (16 - 16) SpO2: 95% (95% - 95%)  On Neurological Examination:  Mental Status - Patient is alert, awake, oriented X3. dysarthric names, fluent    Cranial Nerves - PERRL, EOMI, VFF, V1-V3 intact, left facial droop, tongue/uvula midline    Motor Exam -   Right upper 4+/5  Left upper1/5  Right lower 4/5 subtle drift  Left lower 1/5     nml bulk/tone    Sensory    decreased to LT LUe, LLE

## 2018-04-03 NOTE — PATIENT PROFILE ADULT. - FUNCTIONAL SCREEN CURRENT LEVEL: SWALLOWING (IF SCORE 2 OR MORE FOR ANY ITEM, CONSULT REHAB SERVICES), MLM)
REVIEW OF SYSTEMS: All other review of systems is negative unless indicated above.
(2) difficulty swallowing liquids

## 2018-05-14 ENCOUNTER — OUTPATIENT (OUTPATIENT)
Dept: OUTPATIENT SERVICES | Facility: HOSPITAL | Age: 83
LOS: 1 days | End: 2018-05-14
Payer: COMMERCIAL

## 2018-05-14 DIAGNOSIS — I82.409 ACUTE EMBOLISM AND THROMBOSIS OF UNSPECIFIED DEEP VEINS OF UNSPECIFIED LOWER EXTREMITY: ICD-10-CM

## 2018-05-14 PROBLEM — I63.9 CEREBRAL INFARCTION, UNSPECIFIED: Chronic | Status: ACTIVE | Noted: 2017-10-03

## 2018-05-14 PROCEDURE — 93971 EXTREMITY STUDY: CPT

## 2018-05-15 PROCEDURE — 93971 EXTREMITY STUDY: CPT | Mod: 26

## 2018-06-29 ENCOUNTER — APPOINTMENT (OUTPATIENT)
Dept: NEUROLOGY | Facility: CLINIC | Age: 83
End: 2018-06-29

## 2018-07-31 NOTE — PATIENT PROFILE ADULT. - PMH
Yes
Dyslipidemia    ETOH abuse    Gout    HTN (hypertension)    MI (myocardial infarction)    Personal history of asbestosis    UTI (lower urinary tract infection)

## 2018-10-15 ENCOUNTER — INPATIENT (INPATIENT)
Facility: HOSPITAL | Age: 83
LOS: 16 days | Discharge: NURSING, REHAB OR LONG-TERM CARE | DRG: 177 | End: 2018-11-01
Attending: INTERNAL MEDICINE | Admitting: INTERNAL MEDICINE
Payer: COMMERCIAL

## 2018-10-15 VITALS
OXYGEN SATURATION: 94 % | HEART RATE: 67 BPM | RESPIRATION RATE: 24 BRPM | SYSTOLIC BLOOD PRESSURE: 141 MMHG | WEIGHT: 139.99 LBS | DIASTOLIC BLOOD PRESSURE: 80 MMHG | HEIGHT: 63 IN

## 2018-10-15 DIAGNOSIS — J69.0 PNEUMONITIS DUE TO INHALATION OF FOOD AND VOMIT: ICD-10-CM

## 2018-10-15 LAB
ALBUMIN SERPL ELPH-MCNC: 3.6 G/DL — SIGNIFICANT CHANGE UP (ref 3.3–5)
ALP SERPL-CCNC: 110 U/L — SIGNIFICANT CHANGE UP (ref 40–120)
ALT FLD-CCNC: 15 U/L — SIGNIFICANT CHANGE UP (ref 10–45)
ANION GAP SERPL CALC-SCNC: 14 MMOL/L — SIGNIFICANT CHANGE UP (ref 5–17)
APPEARANCE UR: CLEAR — SIGNIFICANT CHANGE UP
APTT BLD: 29.1 SEC — SIGNIFICANT CHANGE UP (ref 27.5–37.4)
AST SERPL-CCNC: 12 U/L — SIGNIFICANT CHANGE UP (ref 10–40)
BASE EXCESS BLDA CALC-SCNC: 5 MMOL/L — HIGH (ref -2–2)
BASOPHILS # BLD AUTO: 0 K/UL — SIGNIFICANT CHANGE UP (ref 0–0.2)
BILIRUB SERPL-MCNC: 0.5 MG/DL — SIGNIFICANT CHANGE UP (ref 0.2–1.2)
BILIRUB UR-MCNC: NEGATIVE — SIGNIFICANT CHANGE UP
BUN SERPL-MCNC: 34 MG/DL — HIGH (ref 7–23)
CALCIUM SERPL-MCNC: 8.9 MG/DL — SIGNIFICANT CHANGE UP (ref 8.4–10.5)
CHLORIDE SERPL-SCNC: 100 MMOL/L — SIGNIFICANT CHANGE UP (ref 96–108)
CO2 BLDA-SCNC: 30 MMOL/L — SIGNIFICANT CHANGE UP (ref 22–30)
CO2 SERPL-SCNC: 26 MMOL/L — SIGNIFICANT CHANGE UP (ref 22–31)
COLOR SPEC: YELLOW — SIGNIFICANT CHANGE UP
CREAT SERPL-MCNC: 0.86 MG/DL — SIGNIFICANT CHANGE UP (ref 0.5–1.3)
DIFF PNL FLD: ABNORMAL
EOSINOPHIL # BLD AUTO: 0 K/UL — SIGNIFICANT CHANGE UP (ref 0–0.5)
GAS PNL BLDA: SIGNIFICANT CHANGE UP
GAS PNL BLDV: SIGNIFICANT CHANGE UP
GAS PNL BLDV: SIGNIFICANT CHANGE UP
GLUCOSE SERPL-MCNC: 222 MG/DL — HIGH (ref 70–99)
GLUCOSE UR QL: NEGATIVE — SIGNIFICANT CHANGE UP
HCO3 BLDA-SCNC: 29 MMOL/L — SIGNIFICANT CHANGE UP (ref 21–29)
HCT VFR BLD CALC: 35.2 % — SIGNIFICANT CHANGE UP (ref 34.5–45)
HGB BLD-MCNC: 11.6 G/DL — SIGNIFICANT CHANGE UP (ref 11.5–15.5)
HOROWITZ INDEX BLDA+IHG-RTO: SIGNIFICANT CHANGE UP
INR BLD: 1.07 RATIO — SIGNIFICANT CHANGE UP (ref 0.88–1.16)
KETONES UR-MCNC: NEGATIVE — SIGNIFICANT CHANGE UP
LEUKOCYTE ESTERASE UR-ACNC: ABNORMAL
LYMPHOCYTES # BLD AUTO: 0.6 K/UL — LOW (ref 1–3.3)
LYMPHOCYTES # BLD AUTO: 2 % — LOW (ref 13–44)
MCHC RBC-ENTMCNC: 32.5 PG — SIGNIFICANT CHANGE UP (ref 27–34)
MCHC RBC-ENTMCNC: 33 GM/DL — SIGNIFICANT CHANGE UP (ref 32–36)
MCV RBC AUTO: 98.4 FL — SIGNIFICANT CHANGE UP (ref 80–100)
MONOCYTES # BLD AUTO: 0.9 K/UL — SIGNIFICANT CHANGE UP (ref 0–0.9)
MONOCYTES NFR BLD AUTO: 6 % — SIGNIFICANT CHANGE UP (ref 2–14)
NEUTROPHILS # BLD AUTO: 14.3 K/UL — HIGH (ref 1.8–7.4)
NEUTROPHILS NFR BLD AUTO: 88 % — HIGH (ref 43–77)
NITRITE UR-MCNC: NEGATIVE — SIGNIFICANT CHANGE UP
PCO2 BLDA: 40 MMHG — SIGNIFICANT CHANGE UP (ref 32–46)
PH BLDA: 7.47 — HIGH (ref 7.35–7.45)
PH UR: 7 — SIGNIFICANT CHANGE UP (ref 5–8)
PLATELET # BLD AUTO: 163 K/UL — SIGNIFICANT CHANGE UP (ref 150–400)
PO2 BLDA: 50 MMHG — CRITICAL LOW (ref 74–108)
POTASSIUM SERPL-MCNC: 3.2 MMOL/L — LOW (ref 3.5–5.3)
POTASSIUM SERPL-SCNC: 3.2 MMOL/L — LOW (ref 3.5–5.3)
PROT SERPL-MCNC: 7.9 G/DL — SIGNIFICANT CHANGE UP (ref 6–8.3)
PROT UR-MCNC: ABNORMAL
PROTHROM AB SERPL-ACNC: 11.6 SEC — SIGNIFICANT CHANGE UP (ref 9.8–12.7)
RAPID RVP RESULT: SIGNIFICANT CHANGE UP
RBC # BLD: 3.57 M/UL — LOW (ref 3.8–5.2)
RBC # FLD: 13.9 % — SIGNIFICANT CHANGE UP (ref 10.3–14.5)
SAO2 % BLDA: 87 % — LOW (ref 92–96)
SODIUM SERPL-SCNC: 140 MMOL/L — SIGNIFICANT CHANGE UP (ref 135–145)
SP GR SPEC: 1.02 — SIGNIFICANT CHANGE UP (ref 1.01–1.02)
UROBILINOGEN FLD QL: ABNORMAL
WBC # BLD: 15.8 K/UL — HIGH (ref 3.8–10.5)
WBC # FLD AUTO: 15.8 K/UL — HIGH (ref 3.8–10.5)

## 2018-10-15 PROCEDURE — 71045 X-RAY EXAM CHEST 1 VIEW: CPT | Mod: 26

## 2018-10-15 PROCEDURE — 99291 CRITICAL CARE FIRST HOUR: CPT | Mod: GC

## 2018-10-15 RX ORDER — LEVETIRACETAM 250 MG/1
750 TABLET, FILM COATED ORAL
Qty: 0 | Refills: 0 | Status: DISCONTINUED | OUTPATIENT
Start: 2018-10-15 | End: 2018-11-01

## 2018-10-15 RX ORDER — AMLODIPINE BESYLATE 2.5 MG/1
10 TABLET ORAL DAILY
Qty: 0 | Refills: 0 | Status: DISCONTINUED | OUTPATIENT
Start: 2018-10-15 | End: 2018-11-01

## 2018-10-15 RX ORDER — ACETAMINOPHEN 500 MG
1000 TABLET ORAL ONCE
Qty: 0 | Refills: 0 | Status: COMPLETED | OUTPATIENT
Start: 2018-10-15 | End: 2018-10-15

## 2018-10-15 RX ORDER — PIPERACILLIN AND TAZOBACTAM 4; .5 G/20ML; G/20ML
3.38 INJECTION, POWDER, LYOPHILIZED, FOR SOLUTION INTRAVENOUS EVERY 8 HOURS
Qty: 0 | Refills: 0 | Status: DISCONTINUED | OUTPATIENT
Start: 2018-10-15 | End: 2018-10-16

## 2018-10-15 RX ORDER — CITALOPRAM 10 MG/1
40 TABLET, FILM COATED ORAL DAILY
Qty: 0 | Refills: 0 | Status: DISCONTINUED | OUTPATIENT
Start: 2018-10-15 | End: 2018-11-01

## 2018-10-15 RX ORDER — PANTOPRAZOLE SODIUM 20 MG/1
40 TABLET, DELAYED RELEASE ORAL DAILY
Qty: 0 | Refills: 0 | Status: DISCONTINUED | OUTPATIENT
Start: 2018-10-15 | End: 2018-11-01

## 2018-10-15 RX ORDER — LEVOTHYROXINE SODIUM 125 MCG
50 TABLET ORAL DAILY
Qty: 0 | Refills: 0 | Status: DISCONTINUED | OUTPATIENT
Start: 2018-10-15 | End: 2018-11-01

## 2018-10-15 RX ORDER — ALLOPURINOL 300 MG
300 TABLET ORAL DAILY
Qty: 0 | Refills: 0 | Status: DISCONTINUED | OUTPATIENT
Start: 2018-10-15 | End: 2018-11-01

## 2018-10-15 RX ORDER — SODIUM CHLORIDE 9 MG/ML
1000 INJECTION INTRAMUSCULAR; INTRAVENOUS; SUBCUTANEOUS ONCE
Qty: 0 | Refills: 0 | Status: COMPLETED | OUTPATIENT
Start: 2018-10-15 | End: 2018-10-15

## 2018-10-15 RX ORDER — PIPERACILLIN AND TAZOBACTAM 4; .5 G/20ML; G/20ML
3.38 INJECTION, POWDER, LYOPHILIZED, FOR SOLUTION INTRAVENOUS ONCE
Qty: 0 | Refills: 0 | Status: COMPLETED | OUTPATIENT
Start: 2018-10-15 | End: 2018-10-15

## 2018-10-15 RX ORDER — METOPROLOL TARTRATE 50 MG
25 TABLET ORAL
Qty: 0 | Refills: 0 | Status: DISCONTINUED | OUTPATIENT
Start: 2018-10-15 | End: 2018-11-01

## 2018-10-15 RX ORDER — IPRATROPIUM/ALBUTEROL SULFATE 18-103MCG
3 AEROSOL WITH ADAPTER (GRAM) INHALATION EVERY 6 HOURS
Qty: 0 | Refills: 0 | Status: DISCONTINUED | OUTPATIENT
Start: 2018-10-15 | End: 2018-10-18

## 2018-10-15 RX ORDER — POTASSIUM CHLORIDE 20 MEQ
40 PACKET (EA) ORAL ONCE
Qty: 0 | Refills: 0 | Status: COMPLETED | OUTPATIENT
Start: 2018-10-15 | End: 2018-10-16

## 2018-10-15 RX ORDER — ALBUTEROL 90 UG/1
1 AEROSOL, METERED ORAL EVERY 4 HOURS
Qty: 0 | Refills: 0 | Status: DISCONTINUED | OUTPATIENT
Start: 2018-10-15 | End: 2018-10-24

## 2018-10-15 RX ORDER — VANCOMYCIN HCL 1 G
1000 VIAL (EA) INTRAVENOUS ONCE
Qty: 0 | Refills: 0 | Status: COMPLETED | OUTPATIENT
Start: 2018-10-15 | End: 2018-10-15

## 2018-10-15 RX ORDER — TIOTROPIUM BROMIDE 18 UG/1
1 CAPSULE ORAL; RESPIRATORY (INHALATION) DAILY
Qty: 0 | Refills: 0 | Status: DISCONTINUED | OUTPATIENT
Start: 2018-10-15 | End: 2018-10-29

## 2018-10-15 RX ORDER — HEPARIN SODIUM 5000 [USP'U]/ML
5000 INJECTION INTRAVENOUS; SUBCUTANEOUS EVERY 12 HOURS
Qty: 0 | Refills: 0 | Status: DISCONTINUED | OUTPATIENT
Start: 2018-10-15 | End: 2018-11-01

## 2018-10-15 RX ADMIN — PIPERACILLIN AND TAZOBACTAM 3.38 GRAM(S): 4; .5 INJECTION, POWDER, LYOPHILIZED, FOR SOLUTION INTRAVENOUS at 12:50

## 2018-10-15 RX ADMIN — Medication 400 MILLIGRAM(S): at 13:36

## 2018-10-15 RX ADMIN — Medication 250 MILLIGRAM(S): at 14:22

## 2018-10-15 RX ADMIN — LEVETIRACETAM 750 MILLIGRAM(S): 250 TABLET, FILM COATED ORAL at 22:02

## 2018-10-15 RX ADMIN — Medication 1000 MILLIGRAM(S): at 14:15

## 2018-10-15 RX ADMIN — SODIUM CHLORIDE 1000 MILLILITER(S): 9 INJECTION INTRAMUSCULAR; INTRAVENOUS; SUBCUTANEOUS at 14:22

## 2018-10-15 RX ADMIN — HEPARIN SODIUM 5000 UNIT(S): 5000 INJECTION INTRAVENOUS; SUBCUTANEOUS at 21:46

## 2018-10-15 RX ADMIN — PIPERACILLIN AND TAZOBACTAM 200 GRAM(S): 4; .5 INJECTION, POWDER, LYOPHILIZED, FOR SOLUTION INTRAVENOUS at 12:20

## 2018-10-15 RX ADMIN — Medication 3 MILLILITER(S): at 18:54

## 2018-10-15 NOTE — H&P ADULT - NSHPLABSRESULTS_GEN_ALL_CORE
LABS:                        11.6   15.8  )-----------( 163      ( 15 Oct 2018 12:06 )             35.2     10-15    140  |  100  |  34<H>  ----------------------------<  222<H>  3.2<L>   |  26  |  0.86    Ca    8.9      15 Oct 2018 12:06    TPro  7.9  /  Alb  3.6  /  TBili  0.5  /  DBili  x   /  AST  12  /  ALT  15  /  AlkPhos  110  10-15    PT/INR - ( 15 Oct 2018 12:06 )   PT: 11.6 sec;   INR: 1.07 ratio         PTT - ( 15 Oct 2018 12:06 )  PTT:29.1 sec      Urinalysis Basic - ( 15 Oct 2018 13:11 )    Color: Yellow / Appearance: Clear / S.018 / pH: x  Gluc: x / Ketone: Negative  / Bili: Negative / Urobili: 2 mg/dL   Blood: x / Protein: 100 mg/dL / Nitrite: Negative   Leuk Esterase: Moderate / RBC: 4 /hpf / WBC 7 /hpf   Sq Epi: x / Non Sq Epi: 2 /hpf / Bacteria: Moderate        ABG - ( 15 Oct 2018 12:06 )  pH, Arterial: 7.47  pH, Blood: x     /  pCO2: 40    /  pO2: 50    / HCO3: 29    / Base Excess: 5.0   /  SaO2: 87                10-15 @ 11:52  3.5  60    Hemoglobin A1C, Whole Blood: 5.6 % (10-01 @ 07:40)    Thyroid Stimulating Hormone, Serum: 1.30 uIU/mL (10-01 @ 07:40)

## 2018-10-15 NOTE — ED PROVIDER NOTE - PHYSICAL EXAMINATION
Berna Hazel M.D.:   patient awake alert seen lying on stretcher in significant resp distress, tachypnic, tachycardic.   LUNGS Coarse and crackles in b/l lung fields.   CARD tachycardic no m/r/g.    Abdomen soft NT ND no rebound no guarding no CVA tenderness.   EXT WWP no edema no calf tenderness CV 2+DP/PT bilaterally.   neuro A&Ox3 gait normal.    skin warm and dry no rash  HEENT: dry mucous membranes, PERRL, EOMI

## 2018-10-15 NOTE — ED PROVIDER NOTE - PROGRESS NOTE DETAILS
pt arrived in significant resp distress, BiPAP initiated with improvement of work of breathing. pt arrived on NRB, initial PAO2 is 50. low threshold to intubate should status worsen. of note: lab error. the first blood gas resulted, which is listed as a VBG is actually an ABG and the second gas which is marked as an ABG is actually a VBG

## 2018-10-15 NOTE — ED PROVIDER NOTE - CRITICAL CARE PROVIDED
direct patient care (not related to procedure)/additional history taking/documentation/interpretation of diagnostic studies/consultation with other physicians/consult w/ pt's family directly relating to pts condition

## 2018-10-15 NOTE — H&P ADULT - HISTORY OF PRESENT ILLNESS
International Travel? No(1)    .	   83F hx CVA, bed bound, PEG dependent, HTN, HLD, BIBEMS for resp distress x1day.  associated with cough,  rigors, subjective fevers. no n/v no abd pain no cp.  pt d oes have a h/o have hx of aspiration pna.	     Past Medical History:  CVA (cerebral vascular accident)    Dyslipidemia    Gout    HTN (hypertension)    MI (myocardial infarction)    Personal history of asbestosis    UTI (lower urinary tract infection).

## 2018-10-15 NOTE — ED ADULT NURSE REASSESSMENT NOTE - NS ED NURSE REASSESS COMMENT FT1
pharmacy was called again for patients keppra. They were made aware that this is the second phone call.

## 2018-10-15 NOTE — ED ADULT NURSE NOTE - OBJECTIVE STATEMENT
83F comes to ER via EMS for increasing SOB x1 day. Patient has PMH CVA with residual left sided contractures. Patient also has foot wounds. She comes tachypneic and hypoxic. O2 sat was 92% on RA- MD Grady called respiratory for bipap immediately. Patient states she feels short of breath. She denies N/V/D/dizziness/chills/abdominal pain. She has continuous feeds via PEG. Patient has wet cough. She has crackles and wheezes on auscultation. Patient was placed on cardiac monitor immediately, and bedrails are up for safety. Will continue to monitor patient.

## 2018-10-15 NOTE — ED PROVIDER NOTE - ATTENDING CONTRIBUTION TO CARE
Ayesha Grady MD - Attending Physician: I have personally seen and examined this patient with the resident/fellow.  I have fully participated in the care of this patient. I have reviewed all pertinent clinical information, including history, physical exam, plan and the Resident/Fellow’s note and agree except as noted. See MDM

## 2018-10-15 NOTE — ED ADULT NURSE NOTE - NSIMPLEMENTINTERV_GEN_ALL_ED
Implemented All Fall Risk Interventions:  [place] to call system. Call bell, personal items and telephone within reach. Instruct patient to call for assistance. Room bathroom lighting operational. Non-slip footwear when patient is off stretcher. Physically safe environment: no spills, clutter or unnecessary equipment. Stretcher in lowest position, wheels locked, appropriate side rails in place. Provide visual cue, wrist band, yellow gown, etc. Monitor gait and stability. Monitor for mental status changes and reorient to person, place, and time. Review medications for side effects contributing to fall risk. Reinforce activity limits and safety measures with patient and family.

## 2018-10-15 NOTE — CONSULT NOTE ADULT - ASSESSMENT
pt with hx of cad,htn,cva who presented to er with sob and respiratory distress with normal chest xray.  ? aspiration but PE needs to be ruled out despite pt not being tachycardic  tele  cardiac enzyme.  continue meds  lipid  asa daily  dvt prophylaxis  speech and swallow

## 2018-10-15 NOTE — H&P ADULT - ASSESSMENT
82 year old female PMH recent hemorrhagic CVA, PEG,  HTN, MI,     prior  h/o ETOH abuse, HLD, gout   p/w SOB and reported respiratory distress,  elevated wbc and  hypokalemia.    probable aspiration pma/  gram negative  pna/ on iv  zosyn   ct chest    npo/ on peg feedings   ID eval  on  synthroid, Kepra  for seizure prophylaxis and Celexa for post-stroke depression 40 mg/day  on dvt ppx. 82 year old female PMH recent hemorrhagic CVA, PEG,  HTN, MI,     prior  h/o ETOH abuse, HLD, gout   p/w SOB and  acute respiratory distress/ bipap  in er/ ,  elevated wbc and  hypokalemia.    probable aspiration pma/  gram negative  pna/ on iv  zosyn   ct chest    npo/ on peg feedings   ID eval  on  synthroid, Kepra  for seizure prophylaxis and Celexa for post-stroke depression 40 mg/day  on dvt ppx. 82 year old female PMH recent hemorrhagic CVA, PEG,  HTN, MI,     prior  h/o ETOH abuse, HLD, gout   p/w SOB and  acute respiratory distress/ bipap  in er/ ,  elevated wbc and  hypokalemia.    probable aspiration pma/  gram negative  pna/ on iv  zosyn  dementia/  non verbal   ct chest    npo/ on peg feedings   ID eval  on  synthroid, Kepra  for seizure prophylaxis and Celexa for post-stroke depression 40 mg/day  on dvt ppx.

## 2018-10-15 NOTE — CONSULT NOTE ADULT - SUBJECTIVE AND OBJECTIVE BOX
CHIEF COMPLAINT:Patient is a 83y old  Female who presents with a chief complaint of sob (15 Oct 2018 16:54)      HPI:	   83F hx CVA, bed bound, PEG dependent, HTN, HLD, BIBEMS for resp distress x1day associated  with cough,  rigors, subjective fevers. no n/v no abd pain no cp.  pt d oes have a h/o have hx of aspiration pna.  pt with known hx of cad,cva,htn, who presented with respiratory distress  pt can not give any history.	     Past Medical History:  CVA (cerebral vascular accident)    Dyslipidemia    Gout    HTN (hypertension)    MI (myocardial infarction)    Personal history of asbestosis    UTI (lower urinary tract infection). (15 Oct 2018 16:54)      PAST MEDICAL & SURGICAL HISTORY:  CVA (cerebral vascular accident)  ETOH abuse  UTI (lower urinary tract infection)  Dyslipidemia  Gout  Personal history of asbestosis  HTN (hypertension)  MI (myocardial infarction)  History of benign breast tumor  History of left shoulder fracture      MEDICATIONS  (STANDING):  ALBUTerol    90 MICROgram(s) HFA Inhaler 1 Puff(s) Inhalation every 4 hours  ALBUTerol/ipratropium for Nebulization 3 milliLiter(s) Nebulizer every 6 hours  allopurinol 300 milliGRAM(s) Oral daily  amLODIPine   Tablet 10 milliGRAM(s) Oral daily  citalopram 40 milliGRAM(s) Oral daily  heparin  Injectable 5000 Unit(s) SubCutaneous every 12 hours  levETIRAcetam  Solution 750 milliGRAM(s) Oral two times a day  levothyroxine 50 MICROGram(s) Oral daily  metoprolol tartrate 25 milliGRAM(s) Oral two times a day  pantoprazole   Suspension 40 milliGRAM(s) Oral daily  piperacillin/tazobactam IVPB. 3.375 Gram(s) IV Intermittent every 8 hours  tiotropium 18 MICROgram(s) Capsule 1 Capsule(s) Inhalation daily    MEDICATIONS  (PRN):      FAMILY HISTORY:  No pertinent family history in first degree relatives      SOCIAL HISTORY:    [ ] Non-smoker  [ ] Smoker  [ ] Alcohol    Allergies    Toradol (Urticaria (Mild to Mod); Rash (Mild to Mod))    Intolerances    	    REVIEW OF SYSTEMS:  CONSTITUTIONAL: No fever, weight loss, or fatigue  EYES: No eye pain, visual disturbances, or discharge  ENT:  No difficulty hearing, tinnitus, vertigo; No sinus or throat pain  NECK: No pain or stiffness  RESPIRATORY: No cough,+ wheezing, chills or hemoptysis; + Shortness of Breath  CARDIOVASCULAR: No chest pain, palpitations, passing out, dizziness, or leg swelling  GASTROINTESTINAL: No abdominal or epigastric pain. No nausea, vomiting, or hematemesis; No diarrhea or constipation. No melena or hematochezia.  GENITOURINARY: No dysuria, frequency, hematuria, or incontinence  NEUROLOGICAL: No headaches, memory loss, loss of strength, numbness, or tremors  SKIN: No itching, burning, rashes, or lesions   LYMPH Nodes: No enlarged glands  ENDOCRINE: No heat or cold intolerance; No hair loss  MUSCULOSKELETAL: No joint pain or swelling; No muscle, back, or extremity pain  PSYCHIATRIC: No depression, anxiety, mood swings, or difficulty sleeping  HEME/LYMPH: No easy bruising, or bleeding gums  ALLERGY AND IMMUNOLOGIC: No hives or eczema	    [ ] All others negative	  [ ] Unable to obtain    PHYSICAL EXAM:  T(C): 36.6 (10-15-18 @ 18:54), Max: 38.6 (10-15-18 @ 12:46)  HR: 78 (10-15-18 @ 18:54) (67 - 111)  BP: 113/72 (10-15-18 @ 18:54) (113/72 - 141/80)  RR: 16 (10-15-18 @ 18:54) (16 - 24)  SpO2: 100% (10-15-18 @ 18:54) (92% - 100%)  Wt(kg): --  I&O's Summary      Appearance: Normal	  HEENT:   Normal oral mucosa, PERRL, EOMI	  Lymphatic: No lymphadenopathy  Cardiovascular: Normal S1 S2, No JVD, + murmurs, No edema  Respiratory: rhonchi	  Psychiatry: A & O x 3, Mood & affect appropriate  Gastrointestinal:  Soft, Non-tender, + BS	  Skin: No rashes, No ecchymoses, No cyanosis	  Neurologic: Non-focal  Extremities: Normal range of motion, No clubbing, cyanosis or edema  Vascular: Peripheral pulses palpable 2+ bilaterally    TELEMETRY: 	    ECG:  	  RADIOLOGY:  OTHER: 	  	  LABS:	 	    CARDIAC MARKERS:                              11.6   15.8  )-----------( 163      ( 15 Oct 2018 12:06 )             35.2     10-15    140  |  100  |  34<H>  ----------------------------<  222<H>  3.2<L>   |  26  |  0.86    Ca    8.9      15 Oct 2018 12:06    TPro  7.9  /  Alb  3.6  /  TBili  0.5  /  DBili  x   /  AST  12  /  ALT  15  /  AlkPhos  110  10-15    proBNP:   Lipid Profile:   HgA1c:   TSH:   PT/INR - ( 15 Oct 2018 12:06 )   PT: 11.6 sec;   INR: 1.07 ratio         PTT - ( 15 Oct 2018 12:06 )  PTT:29.1 sec    PREVIOUS DIAGNOSTIC TESTING:    < from: Transthoracic Echocardiogram (07.05.17 @ 10:24) >  Conclusions:  1. Normal left ventricular internal dimensions and wall  thicknesses.  2. Normal left ventricular systolic function. No segmental  wall motion abnormalities.  3. Normal diastolic function  4. Normal right ventricular size and function    < from: Nuclear Stress Test, Pharmacologic (05.21.12 @ 00:00) >  * The left ventricle was normal in size.  * Tracer uptake was homogeneous throughout the left  ventricle.  * Normal study; no evidence for myocardial infarction or  ischemia.  * Gated wall motion analysis was performed, and shows  normal wall motion.* Chest Pain: No chest pain with  administration of Regadenoson.  * Symptom: No Symptom.  * HR Response: Appropriate.  * BP Response: Appropriate.  * Heart Rhythm: Sinus Rhythm - 55 BPM.  * ECG Abnormalities: None.  * Arrhythmia: None.  * Review of raw data shows: The study is of good technical  quality.  * The left ventricle was normal in size. Normal myocardial  perfusion scan, with no evidence of infarction or  inducible ischemia.  * Gated wall motion analysis is performed, and shows  normal wall motion with post stress LVEF of 74% and post  stress LVEDV of  57 ml.    < from: Xray Chest 1 View- PORTABLE-Urgent (10.15.18 @ 12:21) >  Poor inspiratory effort. The heart is normal in size. The lungs are   clear. Orthopedic hardware is seen in the left shoulder. Degenerative   changes of the thoracic spine.      < from: 12 Lead ECG (10.03.17 @ 21:49) >  Diagnosis Line SINUS RHYTHM WITH 1ST DEGREE A-V BLOCK    < end of copied text >

## 2018-10-15 NOTE — ED PROVIDER NOTE - OBJECTIVE STATEMENT
Berna Hazel M.D: 83F hx CVA Berna Hazel M.D: 83F hx CVA, bed bound, PEG dependent, HTN, HLD, BIBEMS for resp distress x1day. associated with cough rigors, subjective fevers. no n/v no abd pain no cp. no reported copd history, though does have hx of aspiration pna.

## 2018-10-15 NOTE — ED PROVIDER NOTE - MEDICAL DECISION MAKING DETAILS
83F p/w resp distress, tachypnic, tachycardic concerning for pna, possible ARDS given diffuse lung findings. for sepsis work up, labs, cultures, bipap, rvp, antibiotics, admission. 83F p/w resp distress, tachypnic, tachycardic concerning for pna, possible ARDS given diffuse lung findings. for sepsis work up, labs, cultures, bipap, rvp, antibiotics, admission.    Ayesha Grady MD - Attending Physician: Pt here with crackles/wheezes, SOB, hypoxia from [place]. Concern for pna vs aspiration pna. Bipap, labs, xr, abx, tba

## 2018-10-15 NOTE — H&P ADULT - NSHPPHYSICALEXAM_GEN_ALL_CORE
PHYSICAL EXAMINATION:  Vital Signs Last 24 Hrs  T(C): 38.6 (15 Oct 2018 12:46), Max: 38.6 (15 Oct 2018 12:46)  T(F): 101.4 (15 Oct 2018 12:46), Max: 101.4 (15 Oct 2018 12:46)  HR: 99 (15 Oct 2018 15:21) (67 - 111)  BP: 121/72 (15 Oct 2018 15:21) (121/72 - 141/80)  BP(mean): --  RR: 20 (15 Oct 2018 15:21) (20 - 24)  SpO2: 99% (15 Oct 2018 15:21) (92% - 99%)  CAPILLARY BLOOD GLUCOSE            GENERAL: NAD, well-groomed,  HEAD:  atraumatic, normocephalic  EYES: sclera anicteric  ENMT: mucous membranes moist  NECK: supple, No JVD  CHEST/LUNG:  few rhonchi,   HEART: normal S1, S2  ABDOMEN: BS+, soft, ND, NT   EXTREMITIES:    no    edema    b/l LEs  NEURO: awake, ,     moves all extremities  SKIN: no     rash PHYSICAL EXAMINATION:  Vital Signs Last 24 Hrs  T(C): 38.6 (15 Oct 2018 12:46), Max: 38.6 (15 Oct 2018 12:46)  T(F): 101.4 (15 Oct 2018 12:46), Max: 101.4 (15 Oct 2018 12:46)  HR: 99 (15 Oct 2018 15:21) (67 - 111)  BP: 121/72 (15 Oct 2018 15:21) (121/72 - 141/80)  BP(mean): --  RR: 20 (15 Oct 2018 15:21) (20 - 24)  SpO2: 99% (15 Oct 2018 15:21) (92% - 99%)  CAPILLARY BLOOD GLUCOSE            GENERAL: NAD, well-groomed,  HEAD:  atraumatic, normocephalic  EYES: sclera anicteric  ENMT: mucous membranes moist  NECK: supple, No JVD  CHEST/LUNG:  few rhonchi,   HEART: normal S1, S2  ABDOMEN: BS+, soft, ND, NT   EXTREMITIES:     mild   edema    b/l LEs  NEURO:  dementia .  non verbal,      SKIN: no     rash

## 2018-10-15 NOTE — H&P ADULT - NSHPREVIEWOFSYSTEMS_GEN_ALL_CORE
REVIEW OF SYSTEMS:  GEN: no fever,    no chills  RESP: SOB,   cough  CVS: no chest pain,   no palpitations  GI: no abdominal pain,   no nausea,   no vomiting,   no constipation,   no diarrhea  : no dysuria,   no frequency  NEURO: no headache,   no dizziness  PSYCH: no depression,   not anxious  Derm : no rash

## 2018-10-15 NOTE — ED ADULT NURSE REASSESSMENT NOTE - NS ED NURSE REASSESS COMMENT FT1
patient was asleep prior to ER assessing her. She denies any pain. She is tolerating bipap well. Admitted pending bed assignment.

## 2018-10-16 LAB
ANION GAP SERPL CALC-SCNC: 11 MMOL/L — SIGNIFICANT CHANGE UP (ref 5–17)
BUN SERPL-MCNC: 26 MG/DL — HIGH (ref 7–23)
CALCIUM SERPL-MCNC: 9.3 MG/DL — SIGNIFICANT CHANGE UP (ref 8.4–10.5)
CHLORIDE SERPL-SCNC: 104 MMOL/L — SIGNIFICANT CHANGE UP (ref 96–108)
CO2 SERPL-SCNC: 27 MMOL/L — SIGNIFICANT CHANGE UP (ref 22–31)
CREAT SERPL-MCNC: 0.69 MG/DL — SIGNIFICANT CHANGE UP (ref 0.5–1.3)
CULTURE RESULTS: SIGNIFICANT CHANGE UP
GLUCOSE SERPL-MCNC: 129 MG/DL — HIGH (ref 70–99)
GRAM STN FLD: SIGNIFICANT CHANGE UP
HCT VFR BLD CALC: 35 % — SIGNIFICANT CHANGE UP (ref 34.5–45)
HGB BLD-MCNC: 10.9 G/DL — LOW (ref 11.5–15.5)
MCHC RBC-ENTMCNC: 31.1 GM/DL — LOW (ref 32–36)
MCHC RBC-ENTMCNC: 31.6 PG — SIGNIFICANT CHANGE UP (ref 27–34)
MCV RBC AUTO: 101.4 FL — HIGH (ref 80–100)
METHOD TYPE: SIGNIFICANT CHANGE UP
MRSA SPEC QL CULT: SIGNIFICANT CHANGE UP
PLATELET # BLD AUTO: 196 K/UL — SIGNIFICANT CHANGE UP (ref 150–400)
POTASSIUM SERPL-MCNC: 3.6 MMOL/L — SIGNIFICANT CHANGE UP (ref 3.5–5.3)
POTASSIUM SERPL-SCNC: 3.6 MMOL/L — SIGNIFICANT CHANGE UP (ref 3.5–5.3)
RBC # BLD: 3.45 M/UL — LOW (ref 3.8–5.2)
RBC # FLD: 15.2 % — HIGH (ref 10.3–14.5)
SODIUM SERPL-SCNC: 142 MMOL/L — SIGNIFICANT CHANGE UP (ref 135–145)
SPECIMEN SOURCE: SIGNIFICANT CHANGE UP
SPECIMEN SOURCE: SIGNIFICANT CHANGE UP
WBC # BLD: 14.24 K/UL — HIGH (ref 3.8–10.5)
WBC # FLD AUTO: 14.24 K/UL — HIGH (ref 3.8–10.5)

## 2018-10-16 PROCEDURE — 71250 CT THORAX DX C-: CPT | Mod: 26

## 2018-10-16 RX ORDER — VANCOMYCIN HCL 1 G
1000 VIAL (EA) INTRAVENOUS ONCE
Qty: 0 | Refills: 0 | Status: COMPLETED | OUTPATIENT
Start: 2018-10-16 | End: 2018-10-16

## 2018-10-16 RX ORDER — VANCOMYCIN HCL 1 G
1000 VIAL (EA) INTRAVENOUS EVERY 12 HOURS
Qty: 0 | Refills: 0 | Status: DISCONTINUED | OUTPATIENT
Start: 2018-10-16 | End: 2018-10-19

## 2018-10-16 RX ORDER — VANCOMYCIN HCL 1 G
1000 VIAL (EA) INTRAVENOUS ONCE
Qty: 0 | Refills: 0 | Status: DISCONTINUED | OUTPATIENT
Start: 2018-10-16 | End: 2018-10-16

## 2018-10-16 RX ORDER — VANCOMYCIN HCL 1 G
500 VIAL (EA) INTRAVENOUS EVERY 12 HOURS
Qty: 0 | Refills: 0 | Status: DISCONTINUED | OUTPATIENT
Start: 2018-10-16 | End: 2018-10-16

## 2018-10-16 RX ADMIN — Medication 250 MILLIGRAM(S): at 06:33

## 2018-10-16 RX ADMIN — Medication 3 MILLILITER(S): at 06:33

## 2018-10-16 RX ADMIN — LEVETIRACETAM 750 MILLIGRAM(S): 250 TABLET, FILM COATED ORAL at 18:18

## 2018-10-16 RX ADMIN — Medication 25 MILLIGRAM(S): at 18:19

## 2018-10-16 RX ADMIN — Medication 3 MILLILITER(S): at 01:34

## 2018-10-16 RX ADMIN — PANTOPRAZOLE SODIUM 40 MILLIGRAM(S): 20 TABLET, DELAYED RELEASE ORAL at 14:11

## 2018-10-16 RX ADMIN — Medication 3 MILLILITER(S): at 13:46

## 2018-10-16 RX ADMIN — AMLODIPINE BESYLATE 10 MILLIGRAM(S): 2.5 TABLET ORAL at 06:33

## 2018-10-16 RX ADMIN — Medication 300 MILLIGRAM(S): at 14:10

## 2018-10-16 RX ADMIN — HEPARIN SODIUM 5000 UNIT(S): 5000 INJECTION INTRAVENOUS; SUBCUTANEOUS at 18:19

## 2018-10-16 RX ADMIN — Medication 25 MILLIGRAM(S): at 06:33

## 2018-10-16 RX ADMIN — LEVETIRACETAM 750 MILLIGRAM(S): 250 TABLET, FILM COATED ORAL at 06:34

## 2018-10-16 RX ADMIN — HEPARIN SODIUM 5000 UNIT(S): 5000 INJECTION INTRAVENOUS; SUBCUTANEOUS at 06:35

## 2018-10-16 RX ADMIN — Medication 40 MILLIEQUIVALENT(S): at 01:33

## 2018-10-16 RX ADMIN — PIPERACILLIN AND TAZOBACTAM 25 GRAM(S): 4; .5 INJECTION, POWDER, LYOPHILIZED, FOR SOLUTION INTRAVENOUS at 06:33

## 2018-10-16 RX ADMIN — Medication 3 MILLILITER(S): at 23:35

## 2018-10-16 RX ADMIN — CITALOPRAM 40 MILLIGRAM(S): 10 TABLET, FILM COATED ORAL at 14:11

## 2018-10-16 RX ADMIN — Medication 50 MICROGRAM(S): at 06:33

## 2018-10-16 RX ADMIN — Medication 250 MILLIGRAM(S): at 18:52

## 2018-10-16 RX ADMIN — Medication 3 MILLILITER(S): at 18:18

## 2018-10-16 NOTE — PROGRESS NOTE ADULT - SUBJECTIVE AND OBJECTIVE BOX
afebrile/ non verbal  REVIEW OF SYSTEMS:  GEN: no fever,    no chills  RESP: no SOB,   no cough  CVS: no chest pain,   no palpitations  GMEDICATIONS  (STANDING):  ALBUTerol    90 MICROgram(s) HFA Inhaler 1 Puff(s) Inhalation every 4 hours  ALBUTerol/ipratropium for Nebulization 3 milliLiter(s) Nebulizer every 6 hours  allopurinol 300 milliGRAM(s) Oral daily  amLODIPine   Tablet 10 milliGRAM(s) Oral daily  citalopram 40 milliGRAM(s) Oral daily  heparin  Injectable 5000 Unit(s) SubCutaneous every 12 hours  levETIRAcetam  Solution 750 milliGRAM(s) Oral two times a day  levothyroxine 50 MICROGram(s) Oral daily  metoprolol tartrate 25 milliGRAM(s) Oral two times a day  pantoprazole   Suspension 40 milliGRAM(s) Oral daily  piperacillin/tazobactam IVPB. 3.375 Gram(s) IV Intermittent every 8 hours  tiotropium 18 MICROgram(s) Capsule 1 Capsule(s) Inhalation daily    MEDICATIONS  (PRN):      Vital Signs Last 24 Hrs  T(C): 36.8 (16 Oct 2018 06:20), Max: 38.6 (15 Oct 2018 12:46)  T(F): 98.2 (16 Oct 2018 06:20), Max: 101.4 (15 Oct 2018 12:46)  HR: 97 (16 Oct 2018 06:20) (67 - 111)  BP: 133/66 (16 Oct 2018 06:20) (96/64 - 141/80)  BP(mean): --  RR: 20 (16 Oct 2018 06:20) (16 - 24)  SpO2: 96% (16 Oct 2018 06:20) (92% - 100%)  CAPILLARY BLOOD GLUCOSE        I&O's Summary    15 Oct 2018 07:01  -  16 Oct 2018 06:52  --------------------------------------------------------  IN: 470 mL / OUT: 0 mL / NET: 470 mL        PHYSICAL EXAM:  HEAD:  Atraumatic, Normocephalic  NECK: Supple, No   JVD  CHEST/LUNG:   no     rales,     no,    rhonchi  HEART: Regular rate and rhythm;         murmur  ABDOMEN: Soft, Nontender, ;   EXTREMITIES:        edema  NEUROLOGY:  non verbal   dementia    LABS:                        11.6   15.8  )-----------( 163      ( 15 Oct 2018 12:06 )             35.2     10-15    140  |  100  |  34<H>  ----------------------------<  222<H>  3.2<L>   |  26  |  0.86    Ca    8.9      15 Oct 2018 12:06    TPro  7.9  /  Alb  3.6  /  TBili  0.5  /  DBili  x   /  AST  12  /  ALT  15  /  AlkPhos  110  10-15    PT/INR - ( 15 Oct 2018 12:06 )   PT: 11.6 sec;   INR: 1.07 ratio         PTT - ( 15 Oct 2018 12:06 )  PTT:29.1 sec      Urinalysis Basic - ( 15 Oct 2018 13:11 )    Color: Yellow / Appearance: Clear / S.018 / pH: x  Gluc: x / Ketone: Negative  / Bili: Negative / Urobili: 2 mg/dL   Blood: x / Protein: 100 mg/dL / Nitrite: Negative   Leuk Esterase: Moderate / RBC: 4 /hpf / WBC 7 /hpf   Sq Epi: x / Non Sq Epi: 2 /hpf / Bacteria: Moderate        ABG - ( 15 Oct 2018 12:06 )  pH, Arterial: 7.47  pH, Blood: x     /  pCO2: 40    /  pO2: 50    / HCO3: 29    / Base Excess: 5.0   /  SaO2: 87                10-15 @ 17:10  3.3  66    Hemoglobin A1C, Whole Blood: 5.6 % (10-01 @ 07:40)    Thyroid Stimulating Hormone, Serum: 1.30 uIU/mL (10-01 @ 07:40)        Culture - Blood (collected 10-15-18 @ 15:38)  Source: .Blood Blood-Peripheral  Gram Stain (10-16-18 @ 04:39):    Growth in aerobic bottle: Gram Positive Cocci in Clusters  Preliminary Report (10-16-18 @ 04:40):    Growth in aerobic bottle: Gram Positive Cocci in Clusters    "Due to technical problems, Proteus sp. will Not be reported as part of    the BCID panel until further notice"    ***Blood Panel PCR results on this specimen are available    approximately 3 hours after the Gram stain result.***    Gram stain, PCR, and/or culture results may not always    correspond due to difference in methodologies.    ************************************************************    This PCR assay was performed using [employer].    The following targets are tested for: Enterococcus,    vancomycin resistant enterococci, Listeria monocytogenes,    coagulase negative staphylococci, S. aureus,    methicillin resistant S. aureus, Streptococcus agalactiae    (Group B), S. pneumoniae, S.pyogenes (Group A),    Acinetobacter baumannii, Enterobacter cloacae, E. coli,    Klebsiella oxytoca, K. pneumoniae, Proteus sp.,    Serratia marcescens, Haemophilus influenzae,    Neisseria meningitidis, Pseudomonas aeruginosa, Candida    albicans, C. glabrata, C krusei, C parapsilosis,    C. tropicalis and the KPC resistance gene.  Organism: Blood Culture PCR (10-16-18 @ 06:16)  Organism: Blood Culture PCR (10-16-18 @ 06:16)      -  Methicillin resistant Staphylococcus aureus (MRSA): Detec      Method Type: PCR        Consultant(s) Notes Reviewed:      Care Discussed with Consultants/Other Providers:

## 2018-10-16 NOTE — PROGRESS NOTE ADULT - ASSESSMENT
82 year old female PMH recent hemorrhagic CVA, PEG,  HTN, MI,      HLD, gout   p/w SOB and  acute respiratory distress/ bipap  in er/ ,  elevated wbc and  hypokalemia.    probable aspiration pma/  gram negative  pna/ on iv  zosyn  dementia/  non verbal   ct chest    npo/ on peg feedings  on  synthroid, Kepra  for seizure prophylaxis and Celexa for post-stroke depression 40 mg/day  on dvt ppx.   now  with  MRSA bacteremia/ on vanco  ID eval   echo

## 2018-10-16 NOTE — CONSULT NOTE ADULT - SUBJECTIVE AND OBJECTIVE BOX
HPI:   Patient is a 83y female with history of hemorrhagic cva, left hemiplegia, peg, nursing home resident for at least a year who was admitted yesterday with reports of rigors, shortness of breath and fever. She is now known to have high grade mrsa bacteremia. She cannot give a coherent history and just complains of a dry mouth. Last hospital stay in [facility] is from 1 year ago.     REVIEW OF SYSTEMS:  All other review of systems negative (Comprehensive ROS)    PAST MEDICAL & SURGICAL HISTORY:  CVA (cerebral vascular accident)  ETOH abuse  UTI (lower urinary tract infection)  Dyslipidemia  Gout  Personal history of asbestosis  HTN (hypertension)  MI (myocardial infarction)  History of benign breast tumor  History of left shoulder fracture      Allergies    Toradol (Urticaria (Mild to Mod); Rash (Mild to Mod))    Intolerances        Antimicrobials Day #  :  vancomycin  IVPB 500 milliGRAM(s) IV Intermittent every 12 hours  vancomycin  IVPB 1000 milliGRAM(s) IV Intermittent every 12 hours    Other Medications:  ALBUTerol    90 MICROgram(s) HFA Inhaler 1 Puff(s) Inhalation every 4 hours  ALBUTerol/ipratropium for Nebulization 3 milliLiter(s) Nebulizer every 6 hours  allopurinol 300 milliGRAM(s) Oral daily  amLODIPine   Tablet 10 milliGRAM(s) Oral daily  citalopram 40 milliGRAM(s) Oral daily  heparin  Injectable 5000 Unit(s) SubCutaneous every 12 hours  levETIRAcetam  Solution 750 milliGRAM(s) Oral two times a day  levothyroxine 50 MICROGram(s) Oral daily  metoprolol tartrate 25 milliGRAM(s) Oral two times a day  pantoprazole   Suspension 40 milliGRAM(s) Oral daily  tiotropium 18 MICROgram(s) Capsule 1 Capsule(s) Inhalation daily      FAMILY HISTORY:  No pertinent family history in first degree relatives      SOCIAL HISTORY:  Smoking: [ ]Yes [x ]No  ETOH: [ x]Yes [ ]No  Drug Use: [ ]Yes x[ ]No   [x ] Single[ ]    T(F): 99.3 (10-16-18 @ 09:26), Max: 101.4 (10-15-18 @ 12:46)  HR: 92 (10-16-18 @ 11:04)  BP: 127/79 (10-16-18 @ 09:26)  RR: 18 (10-16-18 @ 09:26)  SpO2: 96% (10-16-18 @ 11:04)  Wt(kg): --    PHYSICAL EXAM:  General: alert,mild  respiratory acute distress  Eyes:  anicteric, no conjunctival injection, no discharge  Oropharynx: no lesions or injection 	  Neck: supple, without adenopathy  Lungs: wheezes to auscultation  Heart: regular rate and rhythm; no murmur, rubs or gallops  Abdomen: soft, nondistended, nontender, without mass or organomegaly, clean peg  Skin: no lesions  Extremities: left leg is totally contracted. left shoulder scar,  no swelling or tenderness  Neurologic: alert, moves right extremities  left spastic paraplegia  no sores on back as best i can tell   LAB RESULTS:                        10.9   14.24 )-----------( 196      ( 16 Oct 2018 09:33 )             35.0     10-    142  |  104  |  26<H>  ----------------------------<  129<H>  3.6   |  27  |  0.69    Ca    9.3      16 Oct 2018 07:19    TPro  7.9  /  Alb  3.6  /  TBili  0.5  /  DBili  x   /  AST  12  /  ALT  15  /  AlkPhos  110  10-15    LIVER FUNCTIONS - ( 15 Oct 2018 12:06 )  Alb: 3.6 g/dL / Pro: 7.9 g/dL / ALK PHOS: 110 U/L / ALT: 15 U/L / AST: 12 U/L / GGT: x           Urinalysis Basic - ( 15 Oct 2018 13:11 )    Color: Yellow / Appearance: Clear / S.018 / pH: x  Gluc: x / Ketone: Negative  / Bili: Negative / Urobili: 2 mg/dL   Blood: x / Protein: 100 mg/dL / Nitrite: Negative   Leuk Esterase: Moderate / RBC: 4 /hpf / WBC 7 /hpf   Sq Epi: x / Non Sq Epi: 2 /hpf / Bacteria: Moderate        MICROBIOLOGY:  RECENT CULTURES:  10-15 @ 15:38 .Blood Blood-Peripheral Blood Culture PCR    Growth in aerobic bottle: Gram Positive Cocci in Clusters    Growth in aerobic bottle: Gram Positive Cocci in Clusters    Culture - Blood (10.15.18 @ 15:38)    Gram Stain:   Growth in aerobic bottle: Gram Positive Cocci in Clusters  Growth in anaerobic bottle: Gram Positive Cocci in Clusters    -  Methicillin resistant Staphylococcus aureus (MRSA): Detec    Specimen Source: .Blood Blood-Peripheral    Organism: Blood Culture PCR    Culture Results:   Growth in aerobic bottle: Gram Positive Cocci in Clusters  Growth in anaerobic bottle: Gram Positive Cocci in Clusters  "Due to technical problems, Proteus sp. will Not be reported as part of  the BCID panel until further notice"  ***Blood Panel PCR results on this specimen are available  approximately 3 hours after the Gram stain result.***  Gram stain, PCR, and/or culture results may not always  correspond due to difference in methodologies.  ************************************************************        RADIOLOGY REVIEWED:  < from: Xray Chest 1 View- PORTABLE-Urgent (10.15.18 @ 12:21) >  Impression:    Poor inspiratory effort. The heart is normal in size. The lungs are   clear. Orthopedic hardware is seen in the left shoulder. Degenerative   changes of the thoracic spine.      < end of copied text >          Impression:  patient with history of remote cva, nursing home resident who had fever, sob, and is now known to have mrsa bacteremia. Source is not overtly apparent but could be pulmonary given shortness of breath despite negative cxr. As best I can she has no other obvious portals of entry. Given high grade could have endovascular focus. She has shoulder hardware but not swollen or tender so hopefully has not seeded there.  Recommendations:  continue with vancomycin  repeat blood cultures pend  ct chest  await echo HPI:   Patient is a 83y female with history of hemorrhagic cva, left hemiplegia, peg, nursing home resident for at least a year who was admitted yesterday with reports of rigors, shortness of breath and fever. She is now known to have high grade mrsa bacteremia. She cannot give a coherent history and just complains of a dry mouth. Last hospital stay in [facility] is from 1 year ago.     REVIEW OF SYSTEMS:  All other review of systems negative (Comprehensive ROS)    PAST MEDICAL & SURGICAL HISTORY:  CVA (cerebral vascular accident)  ETOH abuse  UTI (lower urinary tract infection)  Dyslipidemia  Gout  Personal history of asbestosis  HTN (hypertension)  MI (myocardial infarction)  History of benign breast tumor  History of left shoulder fracture      Allergies    Toradol (Urticaria (Mild to Mod); Rash (Mild to Mod))    Intolerances        Antimicrobials Day #  :  vancomycin  IVPB 500 milliGRAM(s) IV Intermittent every 12 hours  vancomycin  IVPB 1000 milliGRAM(s) IV Intermittent every 12 hours    Other Medications:  ALBUTerol    90 MICROgram(s) HFA Inhaler 1 Puff(s) Inhalation every 4 hours  ALBUTerol/ipratropium for Nebulization 3 milliLiter(s) Nebulizer every 6 hours  allopurinol 300 milliGRAM(s) Oral daily  amLODIPine   Tablet 10 milliGRAM(s) Oral daily  citalopram 40 milliGRAM(s) Oral daily  heparin  Injectable 5000 Unit(s) SubCutaneous every 12 hours  levETIRAcetam  Solution 750 milliGRAM(s) Oral two times a day  levothyroxine 50 MICROGram(s) Oral daily  metoprolol tartrate 25 milliGRAM(s) Oral two times a day  pantoprazole   Suspension 40 milliGRAM(s) Oral daily  tiotropium 18 MICROgram(s) Capsule 1 Capsule(s) Inhalation daily      FAMILY HISTORY:  No pertinent family history in first degree relatives      SOCIAL HISTORY:  Smoking: [ ]Yes [x ]No  ETOH: [ x]Yes [ ]No  Drug Use: [ ]Yes x[ ]No   [x ] Single[ ]    T(F): 99.3 (10-16-18 @ 09:26), Max: 101.4 (10-15-18 @ 12:46)  HR: 92 (10-16-18 @ 11:04)  BP: 127/79 (10-16-18 @ 09:26)  RR: 18 (10-16-18 @ 09:26)  SpO2: 96% (10-16-18 @ 11:04)  Wt(kg): --    PHYSICAL EXAM:  General: alert,mild  respiratory acute distress  Eyes:  anicteric, no conjunctival injection, no discharge  Oropharynx: no lesions or injection 	  Neck: supple, without adenopathy  Lungs: wheezes to auscultation  Heart: regular rate and rhythm; no murmur, rubs or gallops  Abdomen: soft, nondistended, nontender, without mass or organomegaly, clean peg  Skin: no lesions  Extremities: left leg is totally contracted. left shoulder scar,  no swelling or tenderness  Neurologic: alert, moves right extremities  left spastic paraplegia  no sores on back as best i can tell   LAB RESULTS:                        10.9   14.24 )-----------( 196      ( 16 Oct 2018 09:33 )             35.0     10-    142  |  104  |  26<H>  ----------------------------<  129<H>  3.6   |  27  |  0.69    Ca    9.3      16 Oct 2018 07:19    TPro  7.9  /  Alb  3.6  /  TBili  0.5  /  DBili  x   /  AST  12  /  ALT  15  /  AlkPhos  110  10-15    LIVER FUNCTIONS - ( 15 Oct 2018 12:06 )  Alb: 3.6 g/dL / Pro: 7.9 g/dL / ALK PHOS: 110 U/L / ALT: 15 U/L / AST: 12 U/L / GGT: x           Urinalysis Basic - ( 15 Oct 2018 13:11 )    Color: Yellow / Appearance: Clear / S.018 / pH: x  Gluc: x / Ketone: Negative  / Bili: Negative / Urobili: 2 mg/dL   Blood: x / Protein: 100 mg/dL / Nitrite: Negative   Leuk Esterase: Moderate / RBC: 4 /hpf / WBC 7 /hpf   Sq Epi: x / Non Sq Epi: 2 /hpf / Bacteria: Moderate        MICROBIOLOGY:  RECENT CULTURES:  10-15 @ 15:38 .Blood Blood-Peripheral Blood Culture PCR    Growth in aerobic bottle: Gram Positive Cocci in Clusters    Growth in aerobic bottle: Gram Positive Cocci in Clusters    Culture - Blood (10.15.18 @ 15:38)    Gram Stain:   Growth in aerobic bottle: Gram Positive Cocci in Clusters  Growth in anaerobic bottle: Gram Positive Cocci in Clusters    -  Methicillin resistant Staphylococcus aureus (MRSA): Detec    Specimen Source: .Blood Blood-Peripheral    Organism: Blood Culture PCR    Culture Results:   Growth in aerobic bottle: Gram Positive Cocci in Clusters  Growth in anaerobic bottle: Gram Positive Cocci in Clusters  "Due to technical problems, Proteus sp. will Not be reported as part of  the BCID panel until further notice"  ***Blood Panel PCR results on this specimen are available  approximately 3 hours after the Gram stain result.***  Gram stain, PCR, and/or culture results may not always  correspond due to difference in methodologies.  ************************************************************        RADIOLOGY REVIEWED:  < from: Xray Chest 1 View- PORTABLE-Urgent (10.15.18 @ 12:21) >  Impression:    Poor inspiratory effort. The heart is normal in size. The lungs are   clear. Orthopedic hardware is seen in the left shoulder. Degenerative   changes of the thoracic spine.      < end of copied text >          Impression:  patient with history of remote cva, nursing home resident who had fever, sob, and is now known to have mrsa bacteremia. Source is not overtly apparent but could be pulmonary given shortness of breath despite negative cxr. As best I can she has no other obvious portals of entry. Given high grade could have endovascular focus. She has shoulder hardware but not swollen or tender so hopefully has not seeded there.  Recommendations:  continue with vancomycin  repeat blood cultures pend  ct chest  await echo, will need esdras if echo neg and if can tolerate

## 2018-10-16 NOTE — PROVIDER CONTACT NOTE (CRITICAL VALUE NOTIFICATION) - TEST AND RESULT REPORTED:
ist sample of blood cultures has growth in aerobic bottle gram + cocci in clusters and second sample has sma growth in aerobic bottle

## 2018-10-16 NOTE — CHART NOTE - NSCHARTNOTEFT_GEN_A_CORE
called by RN with prelim blood culture results.   Blood culture from 10/15 prelim with gram + cocci in clusters in aerobic bottle. Pt admitted with aspiration PNA on Zosyn. Will give Vancomycin 1 gram IV  X 1. Will sent repeat cultures are well. Follow up with primary team in AM.    William Pruitt NP  [id number]

## 2018-10-16 NOTE — PATIENT PROFILE ADULT - FLU SEASON?
JAEL  Since she just [facility] then it is viral and just treat symptoms  Continue with ibuprofen/acetaminophen for fever and pain, warm salt gargles, throat lozenges/sprays, popsicles, rest and plenty of fluids. If symptoms don't improve or worsen over the next couple of days, then should be seen. Further questions call back to triage.  Juju Cordova RN      Yes...

## 2018-10-16 NOTE — CHART NOTE - NSCHARTNOTEFT_GEN_A_CORE
83 year old Female with PMH of CVA with left hemiplegia, dysphagia with peg, transferred from Nursing home with rigors, shortness of breath and fever. Patient admitted 10/15/18 for probable Aspiration pneumonia now with MRSA Bacteremia.     SUBJECTIVE / OVERNIGHT EVENTS:    MEDICATIONS  (STANDING):  ALBUTerol    90 MICROgram(s) HFA Inhaler 1 Puff(s) Inhalation every 4 hours  ALBUTerol/ipratropium for Nebulization 3 milliLiter(s) Nebulizer every 6 hours  allopurinol 300 milliGRAM(s) Oral daily  amLODIPine   Tablet 10 milliGRAM(s) Oral daily  citalopram 40 milliGRAM(s) Oral daily  heparin  Injectable 5000 Unit(s) SubCutaneous every 12 hours  levETIRAcetam  Solution 750 milliGRAM(s) Oral two times a day  levothyroxine 50 MICROGram(s) Oral daily  metoprolol tartrate 25 milliGRAM(s) Oral two times a day  pantoprazole   Suspension 40 milliGRAM(s) Oral daily  tiotropium 18 MICROgram(s) Capsule 1 Capsule(s) Inhalation daily  vancomycin  IVPB 1000 milliGRAM(s) IV Intermittent every 12 hours    I&O's Summary    15 Oct 2018 07:01  -  16 Oct 2018 07:00  --------------------------------------------------------  IN: 470 mL / OUT: 0 mL / NET: 470 mL        PHYSICAL EXAM: alert in no distress  GENERAL: NAD   HEAD:  Atraumatic, Normocephalic  EYES: conjunctiva and sclera clear  NECK: Supple, No JVD  CHEST/LUNG: Rhonchi   HEART: Regular rate and rhythm; No murmurs, rubs, or gallops  ABDOMEN: Soft, Nontender, Nondistended; Bowel sounds present, peg intack  EXTREMITIES:   No cyanosis, or edema    LABS:                        10.9   14.24 )-----------( 196      ( 16 Oct 2018 09:33 )             35.0     10-    142  |  104  |  26<H>  ----------------------------<  129<H>  3.6   |  27  |  0.69    Ca    9.3      16 Oct 2018 07:19    TPro  7.9  /  Alb  3.6  /  TBili  0.5  /  DBili  x   /  AST  12  /  ALT  15  /  AlkPhos  110  10-15    PT/INR - ( 15 Oct 2018 12:06 )   PT: 11.6 sec;   INR: 1.07 ratio         PTT - ( 15 Oct 2018 12:06 )  PTT:29.1 sec      Urinalysis Basic - ( 15 Oct 2018 13:11 )    Color: Yellow / Appearance: Clear / S.018 / pH: x  Gluc: x / Ketone: Negative  / Bili: Negative / Urobili: 2 mg/dL   Blood: x / Protein: 100 mg/dL / Nitrite: Negative   Leuk Esterase: Moderate / RBC: 4 /hpf / WBC 7 /hpf   Sq Epi: x / Non Sq Epi: 2 /hpf / Bacteria: Moderate    RADIOLOGY & ADDITIONAL TESTS: CT chest ordered   A/P Aspiration Pneumonia, MRSA Bacteremia- source probable respiratory- ID consulted. Continue Vancomycin IV. Repeat BC x 2                                                                      -Continue DuoNebs, prn Bi-pap                                                                      -CT Chest ordered                                                                      -Monitor VS, Leukocytosis, electrolytes                                                                      -HOB 30%, aspiration precautions                                                                      Care Discussed with Consultants/Other Providers: Dr Segundo, ID Dr Benjamin, pt's son Sy Castillojuliane Montenegro PA-C  Physician Assistant   [id number] 83 year old Female with PMH of CVA with left hemiplegia, dysphagia with peg, transferred from Nursing home with rigors, shortness of breath and fever. Patient admitted 10/15/18 for probable Aspiration pneumonia now with MRSA Bacteremia.     SUBJECTIVE / OVERNIGHT EVENTS:    MEDICATIONS  (STANDING):  ALBUTerol    90 MICROgram(s) HFA Inhaler 1 Puff(s) Inhalation every 4 hours  ALBUTerol/ipratropium for Nebulization 3 milliLiter(s) Nebulizer every 6 hours  allopurinol 300 milliGRAM(s) Oral daily  amLODIPine   Tablet 10 milliGRAM(s) Oral daily  citalopram 40 milliGRAM(s) Oral daily  heparin  Injectable 5000 Unit(s) SubCutaneous every 12 hours  levETIRAcetam  Solution 750 milliGRAM(s) Oral two times a day  levothyroxine 50 MICROGram(s) Oral daily  metoprolol tartrate 25 milliGRAM(s) Oral two times a day  pantoprazole   Suspension 40 milliGRAM(s) Oral daily  tiotropium 18 MICROgram(s) Capsule 1 Capsule(s) Inhalation daily  vancomycin  IVPB 1000 milliGRAM(s) IV Intermittent every 12 hours    I&O's Summary    15 Oct 2018 07:01  -  16 Oct 2018 07:00  --------------------------------------------------------  IN: 470 mL / OUT: 0 mL / NET: 470 mL        PHYSICAL EXAM: alert in no distress  GENERAL: NAD   HEAD:  Atraumatic, Normocephalic  EYES: conjunctiva and sclera clear  NECK: Supple, No JVD  CHEST/LUNG: Rhonchi   HEART: Regular rate and rhythm; No murmurs, rubs, or gallops  ABDOMEN: Soft, Nontender, Nondistended; Bowel sounds present, peg intack  EXTREMITIES:   No cyanosis, or edema    LABS:                        10.9   14.24 )-----------( 196      ( 16 Oct 2018 09:33 )             35.0     10-    142  |  104  |  26<H>  ----------------------------<  129<H>  3.6   |  27  |  0.69    Ca    9.3      16 Oct 2018 07:19    TPro  7.9  /  Alb  3.6  /  TBili  0.5  /  DBili  x   /  AST  12  /  ALT  15  /  AlkPhos  110  10-15    PT/INR - ( 15 Oct 2018 12:06 )   PT: 11.6 sec;   INR: 1.07 ratio         PTT - ( 15 Oct 2018 12:06 )  PTT:29.1 sec      Urinalysis Basic - ( 15 Oct 2018 13:11 )    Color: Yellow / Appearance: Clear / S.018 / pH: x  Gluc: x / Ketone: Negative  / Bili: Negative / Urobili: 2 mg/dL   Blood: x / Protein: 100 mg/dL / Nitrite: Negative   Leuk Esterase: Moderate / RBC: 4 /hpf / WBC 7 /hpf   Sq Epi: x / Non Sq Epi: 2 /hpf / Bacteria: Moderate    RADIOLOGY & ADDITIONAL TESTS: CT chest ordered   A/P Aspiration Pneumonia, MRSA Bacteremia- source probable respiratory- ID consulted. Continue Vancomycin IV. Repeat BC x 2                                                                      -contact isolation                                                                      -Continue DuoNebs, prn Bi-pap                                                                      -CT Chest ordered                                                                      -Monitor VS, Leukocytosis, electrolytes                                                                      -HOB 30%, aspiration precautions                                                                      Care Discussed with Consultants/Other Providers: Dr Segundo, ID Dr Benjamin, pt's son Sy Castillojuliane Montenegro PA-C  Physician Assistant   [id number]

## 2018-10-16 NOTE — PROVIDER CONTACT NOTE (CRITICAL VALUE NOTIFICATION) - ACTION/TREATMENT ORDERED:
pt started on Vanco IV
DANILO Pruitt notified and made aware. Vanco is being ordered. Will continue to monitor.

## 2018-10-17 LAB
ANION GAP SERPL CALC-SCNC: 16 MMOL/L — SIGNIFICANT CHANGE UP (ref 5–17)
BASE EXCESS BLDA CALC-SCNC: 2.3 MMOL/L — HIGH (ref -2–2)
BASE EXCESS BLDA CALC-SCNC: 4 MMOL/L — HIGH (ref -2–2)
BASE EXCESS BLDV CALC-SCNC: 3.4 MMOL/L — HIGH (ref -2–2)
BASOPHILS # BLD AUTO: 0 K/UL — SIGNIFICANT CHANGE UP (ref 0–0.2)
BUN SERPL-MCNC: 19 MG/DL — SIGNIFICANT CHANGE UP (ref 7–23)
CA-I SERPL-SCNC: 1.1 MMOL/L — LOW (ref 1.12–1.3)
CALCIUM SERPL-MCNC: 8.8 MG/DL — SIGNIFICANT CHANGE UP (ref 8.4–10.5)
CHLORIDE BLDV-SCNC: 113 MMOL/L — HIGH (ref 96–108)
CHLORIDE SERPL-SCNC: 105 MMOL/L — SIGNIFICANT CHANGE UP (ref 96–108)
CO2 BLDA-SCNC: 26 MMOL/L — SIGNIFICANT CHANGE UP (ref 22–30)
CO2 BLDA-SCNC: 27 MMOL/L — SIGNIFICANT CHANGE UP (ref 22–30)
CO2 BLDV-SCNC: 27 MMOL/L — SIGNIFICANT CHANGE UP (ref 22–30)
CO2 SERPL-SCNC: 19 MMOL/L — LOW (ref 22–31)
CREAT SERPL-MCNC: 0.66 MG/DL — SIGNIFICANT CHANGE UP (ref 0.5–1.3)
EOSINOPHIL # BLD AUTO: 0.1 K/UL — SIGNIFICANT CHANGE UP (ref 0–0.5)
GAS PNL BLDA: SIGNIFICANT CHANGE UP
GAS PNL BLDA: SIGNIFICANT CHANGE UP
GAS PNL BLDV: 138 MMOL/L — SIGNIFICANT CHANGE UP (ref 136–145)
GAS PNL BLDV: SIGNIFICANT CHANGE UP
GLUCOSE BLDV-MCNC: 181 MG/DL — HIGH (ref 70–99)
GLUCOSE SERPL-MCNC: 185 MG/DL — HIGH (ref 70–99)
HCO3 BLDA-SCNC: 25 MMOL/L — SIGNIFICANT CHANGE UP (ref 21–29)
HCO3 BLDA-SCNC: 26 MMOL/L — SIGNIFICANT CHANGE UP (ref 21–29)
HCO3 BLDV-SCNC: 26 MMOL/L — SIGNIFICANT CHANGE UP (ref 21–29)
HCT VFR BLD CALC: 34.2 % — LOW (ref 34.5–45)
HCT VFR BLDA CALC: 34 % — LOW (ref 39–50)
HGB BLD CALC-MCNC: 11.1 G/DL — LOW (ref 11.5–15.5)
HGB BLD-MCNC: 11.4 G/DL — LOW (ref 11.5–15.5)
HOROWITZ INDEX BLDA+IHG-RTO: 40 — SIGNIFICANT CHANGE UP
HOROWITZ INDEX BLDA+IHG-RTO: SIGNIFICANT CHANGE UP
LACTATE BLDV-MCNC: 1.3 MMOL/L — SIGNIFICANT CHANGE UP (ref 0.7–2)
LYMPHOCYTES # BLD AUTO: 1 K/UL — SIGNIFICANT CHANGE UP (ref 1–3.3)
LYMPHOCYTES # BLD AUTO: 7 % — LOW (ref 13–44)
MCHC RBC-ENTMCNC: 32.7 PG — SIGNIFICANT CHANGE UP (ref 27–34)
MCHC RBC-ENTMCNC: 33.2 GM/DL — SIGNIFICANT CHANGE UP (ref 32–36)
MCV RBC AUTO: 98.6 FL — SIGNIFICANT CHANGE UP (ref 80–100)
MONOCYTES # BLD AUTO: 1 K/UL — HIGH (ref 0–0.9)
MONOCYTES NFR BLD AUTO: 8 % — SIGNIFICANT CHANGE UP (ref 2–14)
MYELOCYTES NFR BLD: 1 % — HIGH (ref 0–0)
NEUTROPHILS # BLD AUTO: 11.7 K/UL — HIGH (ref 1.8–7.4)
NEUTROPHILS NFR BLD AUTO: 77 % — SIGNIFICANT CHANGE UP (ref 43–77)
NEUTS BAND # BLD: 7 % — SIGNIFICANT CHANGE UP (ref 0–8)
OTHER CELLS CSF MANUAL: 15 ML/DL — LOW (ref 18–22)
PCO2 BLDA: 33 MMHG — SIGNIFICANT CHANGE UP (ref 32–46)
PCO2 BLDA: 34 MMHG — SIGNIFICANT CHANGE UP (ref 32–46)
PCO2 BLDV: 35 MMHG — SIGNIFICANT CHANGE UP (ref 35–50)
PH BLDA: 7.48 — HIGH (ref 7.35–7.45)
PH BLDA: 7.52 — HIGH (ref 7.35–7.45)
PH BLDV: 7.49 — HIGH (ref 7.35–7.45)
PLAT MORPH BLD: NORMAL — SIGNIFICANT CHANGE UP
PLATELET # BLD AUTO: 176 K/UL — SIGNIFICANT CHANGE UP (ref 150–400)
PO2 BLDA: 52 MMHG — LOW (ref 74–108)
PO2 BLDA: 69 MMHG — LOW (ref 74–108)
PO2 BLDV: 70 MMHG — HIGH (ref 25–45)
POTASSIUM BLDV-SCNC: 2.8 MMOL/L — CRITICAL LOW (ref 3.5–5.3)
POTASSIUM SERPL-MCNC: 3.3 MMOL/L — LOW (ref 3.5–5.3)
POTASSIUM SERPL-SCNC: 3.3 MMOL/L — LOW (ref 3.5–5.3)
RBC # BLD: 3.47 M/UL — LOW (ref 3.8–5.2)
RBC # FLD: 13.3 % — SIGNIFICANT CHANGE UP (ref 10.3–14.5)
RBC BLD AUTO: SIGNIFICANT CHANGE UP
SAO2 % BLDA: 89 % — LOW (ref 92–96)
SAO2 % BLDA: 95 % — SIGNIFICANT CHANGE UP (ref 92–96)
SAO2 % BLDV: 95 % — HIGH (ref 67–88)
SODIUM SERPL-SCNC: 140 MMOL/L — SIGNIFICANT CHANGE UP (ref 135–145)
VANCOMYCIN TROUGH SERPL-MCNC: 17.2 UG/ML — SIGNIFICANT CHANGE UP (ref 10–20)
WBC # BLD: 13.8 K/UL — HIGH (ref 3.8–10.5)
WBC # FLD AUTO: 13.8 K/UL — HIGH (ref 3.8–10.5)

## 2018-10-17 PROCEDURE — 93306 TTE W/DOPPLER COMPLETE: CPT | Mod: 26

## 2018-10-17 RX ORDER — CHLORHEXIDINE GLUCONATE 213 G/1000ML
1 SOLUTION TOPICAL
Qty: 0 | Refills: 0 | Status: DISCONTINUED | OUTPATIENT
Start: 2018-10-17 | End: 2018-11-01

## 2018-10-17 RX ORDER — ONDANSETRON 8 MG/1
4 TABLET, FILM COATED ORAL ONCE
Qty: 0 | Refills: 0 | Status: COMPLETED | OUTPATIENT
Start: 2018-10-17 | End: 2018-10-17

## 2018-10-17 RX ORDER — POTASSIUM CHLORIDE 20 MEQ
40 PACKET (EA) ORAL ONCE
Qty: 0 | Refills: 0 | Status: COMPLETED | OUTPATIENT
Start: 2018-10-17 | End: 2018-10-17

## 2018-10-17 RX ADMIN — Medication 40 MILLIEQUIVALENT(S): at 18:14

## 2018-10-17 RX ADMIN — CITALOPRAM 40 MILLIGRAM(S): 10 TABLET, FILM COATED ORAL at 13:33

## 2018-10-17 RX ADMIN — HEPARIN SODIUM 5000 UNIT(S): 5000 INJECTION INTRAVENOUS; SUBCUTANEOUS at 05:39

## 2018-10-17 RX ADMIN — Medication 3 MILLILITER(S): at 05:38

## 2018-10-17 RX ADMIN — Medication 100 MILLIGRAM(S): at 18:15

## 2018-10-17 RX ADMIN — Medication 25 MILLIGRAM(S): at 18:17

## 2018-10-17 RX ADMIN — Medication 300 MILLIGRAM(S): at 13:33

## 2018-10-17 RX ADMIN — Medication 250 MILLIGRAM(S): at 09:59

## 2018-10-17 RX ADMIN — Medication 25 MILLIGRAM(S): at 05:40

## 2018-10-17 RX ADMIN — LEVETIRACETAM 750 MILLIGRAM(S): 250 TABLET, FILM COATED ORAL at 05:40

## 2018-10-17 RX ADMIN — PANTOPRAZOLE SODIUM 40 MILLIGRAM(S): 20 TABLET, DELAYED RELEASE ORAL at 13:33

## 2018-10-17 RX ADMIN — Medication 50 MICROGRAM(S): at 05:39

## 2018-10-17 RX ADMIN — LEVETIRACETAM 750 MILLIGRAM(S): 250 TABLET, FILM COATED ORAL at 18:17

## 2018-10-17 RX ADMIN — Medication 3 MILLILITER(S): at 18:14

## 2018-10-17 RX ADMIN — AMLODIPINE BESYLATE 10 MILLIGRAM(S): 2.5 TABLET ORAL at 05:41

## 2018-10-17 RX ADMIN — Medication 3 MILLILITER(S): at 13:34

## 2018-10-17 RX ADMIN — HEPARIN SODIUM 5000 UNIT(S): 5000 INJECTION INTRAVENOUS; SUBCUTANEOUS at 18:17

## 2018-10-17 RX ADMIN — Medication 250 MILLIGRAM(S): at 21:53

## 2018-10-17 RX ADMIN — Medication 3 MILLILITER(S): at 23:55

## 2018-10-17 RX ADMIN — ONDANSETRON 4 MILLIGRAM(S): 8 TABLET, FILM COATED ORAL at 05:38

## 2018-10-17 NOTE — PROGRESS NOTE ADULT - ASSESSMENT
Remote cva, nursing home resident presents with fever, sob, and MRSA bacteremia.   Source not overtly apparent but with shortness of breath, cough, suspicion for pulm focus- now confirmed on CT- multifocal pneumonia  No clues to alternative source  alert, afebrile, WBC lower, repeat Bld Cxs negative    Recommendations:  continue vancomycin- will require minimum 2 wk course IV  Check Vanco trough  Await echo, MIKALA would be ideal  D/w NP

## 2018-10-17 NOTE — PROGRESS NOTE ADULT - SUBJECTIVE AND OBJECTIVE BOX
comfortable non verbal  afebrile    REVIEW OF SYSTEMS:  GEN: no fever,    no chills  RESP: no SOB,   no cough  CVS: no chest pain,   no palpitations  GI: no abdominal pain,   no nausea,   no vomiting,   no constipation,   no diarrhea      MEDICATIONS  (STANDING):  ALBUTerol    90 MICROgram(s) HFA Inhaler 1 Puff(s) Inhalation every 4 hours  ALBUTerol/ipratropium for Nebulization 3 milliLiter(s) Nebulizer every 6 hours  allopurinol 300 milliGRAM(s) Oral daily  amLODIPine   Tablet 10 milliGRAM(s) Oral daily  citalopram 40 milliGRAM(s) Oral daily  heparin  Injectable 5000 Unit(s) SubCutaneous every 12 hours  levETIRAcetam  Solution 750 milliGRAM(s) Oral two times a day  levothyroxine 50 MICROGram(s) Oral daily  metoprolol tartrate 25 milliGRAM(s) Oral two times a day  pantoprazole   Suspension 40 milliGRAM(s) Oral daily  tiotropium 18 MICROgram(s) Capsule 1 Capsule(s) Inhalation daily  vancomycin  IVPB 1000 milliGRAM(s) IV Intermittent every 12 hours    MEDICATIONS  (PRN):      Vital Signs Last 24 Hrs  T(C): 36.8 (17 Oct 2018 06:34), Max: 37.5 (16 Oct 2018 18:15)  T(F): 98.2 (17 Oct 2018 06:34), Max: 99.5 (16 Oct 2018 18:15)  HR: 93 (17 Oct 2018 06:34) (64 - 99)  BP: 125/83 (17 Oct 2018 06:34) (125/83 - 141/89)  BP(mean): --  RR: 20 (17 Oct 2018 06:34) (18 - 20)  SpO2: 96% (17 Oct 2018 06:34) (92% - 99%)  CAPILLARY BLOOD GLUCOSE        I&O's Summary    15 Oct 2018 07:01  -  16 Oct 2018 07:00  --------------------------------------------------------  IN: 470 mL / OUT: 0 mL / NET: 470 mL    16 Oct 2018 07:01  -  17 Oct 2018 06:51  --------------------------------------------------------  IN: 280 mL / OUT: 0 mL / NET: 280 mL        PHYSICAL EXAM:  HEAD:  Atraumatic, Normocephalic  NECK: Supple, No   JVD  CHEST/LUNG:   no     rales,     no,    rhonchi  HEART: Regular rate and rhythm;         murmur  ABDOMEN: Soft, Nontender, ;   EXTREMITIES:    no    edema  NEUROLOGY:  dementia    LABS:                        10.9   14.24 )-----------( 196      ( 16 Oct 2018 09:33 )             35.0     10-    142  |  104  |  26<H>  ----------------------------<  129<H>  3.6   |  27  |  0.69    Ca    9.3      16 Oct 2018 07:19    TPro  7.9  /  Alb  3.6  /  TBili  0.5  /  DBili  x   /  AST  12  /  ALT  15  /  AlkPhos  110  10-15    PT/INR - ( 15 Oct 2018 12:06 )   PT: 11.6 sec;   INR: 1.07 ratio         PTT - ( 15 Oct 2018 12:06 )  PTT:29.1 sec      Urinalysis Basic - ( 15 Oct 2018 13:11 )    Color: Yellow / Appearance: Clear / S.018 / pH: x  Gluc: x / Ketone: Negative  / Bili: Negative / Urobili: 2 mg/dL   Blood: x / Protein: 100 mg/dL / Nitrite: Negative   Leuk Esterase: Moderate / RBC: 4 /hpf / WBC 7 /hpf   Sq Epi: x / Non Sq Epi: 2 /hpf / Bacteria: Moderate        ABG - ( 15 Oct 2018 12:06 )  pH, Arterial: 7.47  pH, Blood: x     /  pCO2: 40    /  pO2: 50    / HCO3: 29    / Base Excess: 5.0   /  SaO2: 87                10-15 @ 17:10  3.3  66    Hemoglobin A1C, Whole Blood: 5.6 % (10-01 @ 07:40)    Thyroid Stimulating Hormone, Serum: 1.30 uIU/mL (10-01 @ 07:40)          Consultant(s) Notes Reviewed:      Care Discussed with Consultants/Other Providers: comfortable ,  talkative  this  am  c/c  left sided  weakness  afebrile    REVIEW OF SYSTEMS:  GEN: no fever,    no chills  RESP: no SOB,   no cough  CVS: no chest pain,   no palpitations  GI: no abdominal pain,   no nausea,   no vomiting,   no constipation,   no diarrhea      MEDICATIONS  (STANDING):  ALBUTerol    90 MICROgram(s) HFA Inhaler 1 Puff(s) Inhalation every 4 hours  ALBUTerol/ipratropium for Nebulization 3 milliLiter(s) Nebulizer every 6 hours  allopurinol 300 milliGRAM(s) Oral daily  amLODIPine   Tablet 10 milliGRAM(s) Oral daily  citalopram 40 milliGRAM(s) Oral daily  heparin  Injectable 5000 Unit(s) SubCutaneous every 12 hours  levETIRAcetam  Solution 750 milliGRAM(s) Oral two times a day  levothyroxine 50 MICROGram(s) Oral daily  metoprolol tartrate 25 milliGRAM(s) Oral two times a day  pantoprazole   Suspension 40 milliGRAM(s) Oral daily  tiotropium 18 MICROgram(s) Capsule 1 Capsule(s) Inhalation daily  vancomycin  IVPB 1000 milliGRAM(s) IV Intermittent every 12 hours    MEDICATIONS  (PRN):      Vital Signs Last 24 Hrs  T(C): 36.8 (17 Oct 2018 06:34), Max: 37.5 (16 Oct 2018 18:15)  T(F): 98.2 (17 Oct 2018 06:34), Max: 99.5 (16 Oct 2018 18:15)  HR: 93 (17 Oct 2018 06:34) (64 - 99)  BP: 125/83 (17 Oct 2018 06:34) (125/83 - 141/89)  BP(mean): --  RR: 20 (17 Oct 2018 06:34) (18 - 20)  SpO2: 96% (17 Oct 2018 06:34) (92% - 99%)  CAPILLARY BLOOD GLUCOSE        I&O's Summary    15 Oct 2018 07:01  -  16 Oct 2018 07:00  --------------------------------------------------------  IN: 470 mL / OUT: 0 mL / NET: 470 mL    16 Oct 2018 07:01  -  17 Oct 2018 06:51  --------------------------------------------------------  IN: 280 mL / OUT: 0 mL / NET: 280 mL        PHYSICAL EXAM:  HEAD:  Atraumatic, Normocephalic  NECK: Supple, No   JVD  CHEST/LUNG:   no     rales,     no,    rhonchi  HEART: Regular rate and rhythm;         murmur  ABDOMEN: Soft, Nontender, ;   EXTREMITIES:    no    edema  NEUROLOGY:   alert    LABS:                        10.9   14.24 )-----------( 196      ( 16 Oct 2018 09:33 )             35.0     10-    142  |  104  |  26<H>  ----------------------------<  129<H>  3.6   |  27  |  0.69    Ca    9.3      16 Oct 2018 07:19    TPro  7.9  /  Alb  3.6  /  TBili  0.5  /  DBili  x   /  AST  12  /  ALT  15  /  AlkPhos  110  10-15    PT/INR - ( 15 Oct 2018 12:06 )   PT: 11.6 sec;   INR: 1.07 ratio         PTT - ( 15 Oct 2018 12:06 )  PTT:29.1 sec      Urinalysis Basic - ( 15 Oct 2018 13:11 )    Color: Yellow / Appearance: Clear / S.018 / pH: x  Gluc: x / Ketone: Negative  / Bili: Negative / Urobili: 2 mg/dL   Blood: x / Protein: 100 mg/dL / Nitrite: Negative   Leuk Esterase: Moderate / RBC: 4 /hpf / WBC 7 /hpf   Sq Epi: x / Non Sq Epi: 2 /hpf / Bacteria: Moderate        ABG - ( 15 Oct 2018 12:06 )  pH, Arterial: 7.47  pH, Blood: x     /  pCO2: 40    /  pO2: 50    / HCO3: 29    / Base Excess: 5.0   /  SaO2: 87                10-15 @ 17:10  3.3  66    Hemoglobin A1C, Whole Blood: 5.6 % (10-01 @ 07:40)    Thyroid Stimulating Hormone, Serum: 1.30 uIU/mL (10-01 @ 07:40)          Consultant(s) Notes Reviewed:      Care Discussed with Consultants/Other Providers:

## 2018-10-17 NOTE — CHART NOTE - NSCHARTNOTEFT_GEN_A_CORE
MEDICINE FRANKI PHILLIPS  Patient is an 83 year old female who presents with a chief complaint of shortness of breath. (16 Oct 2018 11:48)       Event Summary:  Called by RN to report patient taking of BiPAP and monitor reading hypoxia - O2 saturation in 80s.  Patient seen and examined at the bedside.  She is alert, pleasantly confused.  Does not endorse any complaints at this time.  Denies dizziness, chest pain, palpitations, shortness of breath, abdominal pain, nausea, vomiting and diarrhea.       Vital Signs Last 24 Hrs  T(C): 36.8 (17 Oct 2018 06:34), Max: 37.5 (16 Oct 2018 18:15)  T(F): 98.2 (17 Oct 2018 06:34), Max: 99.5 (16 Oct 2018 18:15)  HR: 93 (17 Oct 2018 06:34) (64 - 99)  BP: 125/83 (17 Oct 2018 06:34) (125/83 - 141/89)  RR: 20 (17 Oct 2018 06:34) (18 - 20)  SpO2: 96% (17 Oct 2018 06:34) (92% - 99%)      PHYSICAL EXAM:  GENERAL: Laying down, in no acute distress  HEART: +S1/S2.  Regular rate and rhythm.  No murmurs, rubs or gallops  CHEST/LUNG: Diffuse coarse breath sounds auscultated bilaterally.  No wheezes, rales, rhonchi or crackles  ABDOMEN: Bowel sounds present in all 4 quadrants.  Soft, Nontender, Nondistended      HPI:  Patient is an 83 year old female with a PMHx of CVA, bed bound, PEG, HTN and HLD who was brought in by EMS for respiratory distress x1 day. (15 Oct 2018 16:54)  Now with ?hypoxia.      PLAN: ?Hypoxia  - Probe reading incorrectly.  O2 saturation taken with different monitor and noted to be 92%-96%  - Probe changed and patient maintaining O2 saturation of 96% on 4L nasal cannula  - CT Chest pending.  Follow up with results  - Continue with Proventil and Duonebs  - Can consider pulmonology consult  - Will continue to monitor closely  - Primary team to follow up in AM      #[id number]  Shelly Garnett PA-C  Medicine Department

## 2018-10-17 NOTE — DIETITIAN INITIAL EVALUATION ADULT. - OTHER INFO
Nutrition consult received for stage 2 pressure ulcer. Per chart, pt p/w SOB and  acute respiratory distress now  with  MRSA bacteremia/ on vanco  echo, pending. Pt seen at bedside, Pivot 1.5 infusing at goal of 40ml/hr. Per RN no reported GI distress, BM today.

## 2018-10-17 NOTE — DIETITIAN INITIAL EVALUATION ADULT. - NS AS NUTRI INTERV MEDICAL AND FOOD SUPPLEMENTS
Recommend add Fady 2 packets/daily via PEG to provide an additional 160 Kcal and 28 Gm amino acids to support collagen formation

## 2018-10-17 NOTE — DIETITIAN INITIAL EVALUATION ADULT. - ENERGY NEEDS
Ht: 63 Wt: 204 pounds BMI: 36.5 kg/m2 IBW:115  pounds(+/-10%)   +1 generalized edema. Stage II left lateral cuboid pressure ulcers documented.

## 2018-10-17 NOTE — DIETITIAN INITIAL EVALUATION ADULT. - ADHERENCE
Pt reliant on enteral nutrition via PEG, per chart pt received Jevity 1.5, 900ml daily via PEG to provide 1350kcal, 57gm protein at [facility]

## 2018-10-17 NOTE — PROGRESS NOTE ADULT - SUBJECTIVE AND OBJECTIVE BOX
CC: f/u for MRSA bacteremia, pneumonia    Patient reports cough, but no SOB at rest    REVIEW OF SYSTEMS:  All other review of systems negative (Comprehensive ROS)- limited    Antimicrobials Day # 3  vancomycin  IVPB 1000 milliGRAM(s) IV Intermittent every 12 hours    Other Medications Reviewed    T(F): 98.9 (10-17-18 @ 11:02), Max: 99.5 (10-16-18 @ 18:15)  HR: 92 (10-17-18 @ 16:17)  BP: 142/78 (10-17-18 @ 11:02)  RR: 18 (10-17-18 @ 11:02)  SpO2: 91% (10-17-18 @ 16:17)  Wt(kg): --    PHYSICAL EXAM:  General: alert, no acute distress  Eyes:  anicteric, no conjunctival injection, no discharge  Oropharynx: no lesions or injection 	  Neck: supple, without adenopathy  Lungs: rhonchi anteriorly   Heart: regular rate and rhythm; no murmur, rubs or gallops  Abdomen: soft, nondistended, nontender, without mass or organomegaly  Skin: no lesions  Extremities: L LE contracture,   Neurologic: alert, L weakness    LAB RESULTS:                        11.4   13.8  )-----------( 176      ( 17 Oct 2018 09:53 )             34.2     10-17    140  |  105  |  19  ----------------------------<  185<H>  3.3<L>   |  19<L>  |  0.66    Ca    8.8      17 Oct 2018 09:53          MICROBIOLOGY:  RECENT CULTURES:  10-16 @ 13:21 .Blood Blood-Peripheral     No growth to date.      10-16 @ 13:20 .Blood Blood-Peripheral     No growth to date.      10-15 @ 16:54 .Urine Clean Catch (Midstream)     <10,000 CFU/ml Normal Urogenital ck present    Culture - Blood (10.15.18 @ 15:38)    Gram Stain:   Growth in aerobic bottle: Gram Positive Cocci in Clusters  Growth in anaerobic bottle: Gram Positive Cocci in Clusters    -  Methicillin resistant Staphylococcus aureus (MRSA): Detec      RADIOLOGY REVIEWED:  CT Chest No Cont (10.16.18 @ 22:21) >  Multifocal pneumonia. Follow-up to resolution recommended.    Indeterminateright breast nodule is new from the prior study,   ultrasound/mammography needed for complete evaluation.

## 2018-10-17 NOTE — PROGRESS NOTE ADULT - ASSESSMENT
82 year old female PMH recent hemorrhagic CVA, PEG,  HTN, MI,      HLD, gout   p/w SOB and  acute respiratory distress/ bipap  in er/ ,  elevated wbc and  hypokalemia.    probable aspiration pma/  gram negative  pna/on admission,  was on iv  zosyn  dementia/  non verbal   ct chest , no pna   npo/ on peg feedings  on  synthroid, Kepra , celexa  on dvt ppx.   now  with  MRSA bacteremia/ on vanco   echo, pending 82 year old female PMH recent hemorrhagic CVA, PEG,  HTN, MI,      HLD, gout   p/w SOB and  acute respiratory distress/ bipap  in er/ ,  elevated wbc and  hypokalemia.    probable aspiration pma/  gram negative  pna/on admission,  was on iv  zosyn  dementia  by  history   ct chest , no pna   npo/ on peg feedings  on  synthroid, Kepra , celexa  on dvt ppx.   now  with  MRSA bacteremia/ on vanco   echo, pending

## 2018-10-17 NOTE — DIETITIAN INITIAL EVALUATION ADULT. - NS AS NUTRI INTERV ENTERAL NUTRITION
Recommend Jevity 1.2 @ 50ml/hr x 24 hours to provide 1440kcal, 67gm protein (16kcal/Kg current weight 92.5Kg, 1.3gm/Kg IBW 52Kg)

## 2018-10-18 LAB
ANION GAP SERPL CALC-SCNC: 16 MMOL/L — SIGNIFICANT CHANGE UP (ref 5–17)
BASE EXCESS BLDA CALC-SCNC: 1.8 MMOL/L — SIGNIFICANT CHANGE UP (ref -2–2)
BUN SERPL-MCNC: 24 MG/DL — HIGH (ref 7–23)
CALCIUM SERPL-MCNC: 9.4 MG/DL — SIGNIFICANT CHANGE UP (ref 8.4–10.5)
CHLORIDE SERPL-SCNC: 107 MMOL/L — SIGNIFICANT CHANGE UP (ref 96–108)
CO2 BLDA-SCNC: 26 MMOL/L — SIGNIFICANT CHANGE UP (ref 22–30)
CO2 SERPL-SCNC: 23 MMOL/L — SIGNIFICANT CHANGE UP (ref 22–31)
CREAT SERPL-MCNC: 0.7 MG/DL — SIGNIFICANT CHANGE UP (ref 0.5–1.3)
CULTURE RESULTS: SIGNIFICANT CHANGE UP
GAS PNL BLDA: SIGNIFICANT CHANGE UP
GLUCOSE SERPL-MCNC: 159 MG/DL — HIGH (ref 70–99)
HCO3 BLDA-SCNC: 25 MMOL/L — SIGNIFICANT CHANGE UP (ref 21–29)
HCT VFR BLD CALC: 37.9 % — SIGNIFICANT CHANGE UP (ref 34.5–45)
HGB BLD-MCNC: 12.1 G/DL — SIGNIFICANT CHANGE UP (ref 11.5–15.5)
HOROWITZ INDEX BLDA+IHG-RTO: 40 — SIGNIFICANT CHANGE UP
MCHC RBC-ENTMCNC: 31.8 PG — SIGNIFICANT CHANGE UP (ref 27–34)
MCHC RBC-ENTMCNC: 32 GM/DL — SIGNIFICANT CHANGE UP (ref 32–36)
MCV RBC AUTO: 99.3 FL — SIGNIFICANT CHANGE UP (ref 80–100)
PCO2 BLDA: 34 MMHG — SIGNIFICANT CHANGE UP (ref 32–46)
PH BLDA: 7.47 — HIGH (ref 7.35–7.45)
PLATELET # BLD AUTO: 198 K/UL — SIGNIFICANT CHANGE UP (ref 150–400)
PO2 BLDA: 71 MMHG — LOW (ref 74–108)
POTASSIUM SERPL-MCNC: 3.5 MMOL/L — SIGNIFICANT CHANGE UP (ref 3.5–5.3)
POTASSIUM SERPL-SCNC: 3.5 MMOL/L — SIGNIFICANT CHANGE UP (ref 3.5–5.3)
RBC # BLD: 3.82 M/UL — SIGNIFICANT CHANGE UP (ref 3.8–5.2)
RBC # FLD: 13.9 % — SIGNIFICANT CHANGE UP (ref 10.3–14.5)
SAO2 % BLDA: 96 % — SIGNIFICANT CHANGE UP (ref 92–96)
SODIUM SERPL-SCNC: 146 MMOL/L — HIGH (ref 135–145)
SPECIMEN SOURCE: SIGNIFICANT CHANGE UP
WBC # BLD: 12.8 K/UL — HIGH (ref 3.8–10.5)
WBC # FLD AUTO: 12.8 K/UL — HIGH (ref 3.8–10.5)

## 2018-10-18 PROCEDURE — 93010 ELECTROCARDIOGRAM REPORT: CPT

## 2018-10-18 RX ORDER — ALBUTEROL 90 UG/1
1.25 AEROSOL, METERED ORAL EVERY 6 HOURS
Qty: 0 | Refills: 0 | Status: DISCONTINUED | OUTPATIENT
Start: 2018-10-18 | End: 2018-10-24

## 2018-10-18 RX ORDER — IPRATROPIUM BROMIDE 0.2 MG/ML
500 SOLUTION, NON-ORAL INHALATION EVERY 6 HOURS
Qty: 0 | Refills: 0 | Status: DISCONTINUED | OUTPATIENT
Start: 2018-10-18 | End: 2018-10-24

## 2018-10-18 RX ADMIN — CITALOPRAM 40 MILLIGRAM(S): 10 TABLET, FILM COATED ORAL at 12:52

## 2018-10-18 RX ADMIN — Medication 25 MILLIGRAM(S): at 18:25

## 2018-10-18 RX ADMIN — PANTOPRAZOLE SODIUM 40 MILLIGRAM(S): 20 TABLET, DELAYED RELEASE ORAL at 12:52

## 2018-10-18 RX ADMIN — HEPARIN SODIUM 5000 UNIT(S): 5000 INJECTION INTRAVENOUS; SUBCUTANEOUS at 05:16

## 2018-10-18 RX ADMIN — LEVETIRACETAM 750 MILLIGRAM(S): 250 TABLET, FILM COATED ORAL at 18:24

## 2018-10-18 RX ADMIN — LEVETIRACETAM 750 MILLIGRAM(S): 250 TABLET, FILM COATED ORAL at 05:17

## 2018-10-18 RX ADMIN — Medication 25 MILLIGRAM(S): at 05:18

## 2018-10-18 RX ADMIN — Medication 250 MILLIGRAM(S): at 20:26

## 2018-10-18 RX ADMIN — Medication 300 MILLIGRAM(S): at 12:53

## 2018-10-18 RX ADMIN — ALBUTEROL 1.25 MILLIGRAM(S): 90 AEROSOL, METERED ORAL at 23:26

## 2018-10-18 RX ADMIN — HEPARIN SODIUM 5000 UNIT(S): 5000 INJECTION INTRAVENOUS; SUBCUTANEOUS at 18:25

## 2018-10-18 RX ADMIN — Medication 50 MICROGRAM(S): at 05:18

## 2018-10-18 RX ADMIN — Medication 3 MILLILITER(S): at 12:51

## 2018-10-18 RX ADMIN — Medication 500 MICROGRAM(S): at 23:29

## 2018-10-18 RX ADMIN — CHLORHEXIDINE GLUCONATE 1 APPLICATION(S): 213 SOLUTION TOPICAL at 13:04

## 2018-10-18 RX ADMIN — AMLODIPINE BESYLATE 10 MILLIGRAM(S): 2.5 TABLET ORAL at 05:18

## 2018-10-18 RX ADMIN — Medication 3 MILLILITER(S): at 05:16

## 2018-10-18 RX ADMIN — Medication 250 MILLIGRAM(S): at 10:19

## 2018-10-18 NOTE — PROGRESS NOTE ADULT - ASSESSMENT
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 presents with fever, sob, and MRSA bacteremia.   Source likely multifocal pneumonia  Remains alert, afebrile, WBC trending down, all repeat Bld Cxs negative  Bacteremia promptly controlled  Vancomycin Level, Trough: 17.2- adequate  TTE mild AS, no veg's, but limited    Recommendations:  Continue vancomycin- will require minimum 2 wk course IV total  MIKALA still would be ideal- if no vegetations, would support a shorter course of IV Tx

## 2018-10-18 NOTE — PROGRESS NOTE ADULT - SUBJECTIVE AND OBJECTIVE BOX
CC: f/u for MRSA bacteremia, pneumonia    Patient reports still with cough, no SOB at rest    REVIEW OF SYSTEMS:  All other review of systems negative (Comprehensive ROS)- limited    Antimicrobials Day # 4  vancomycin  IVPB 1000 milliGRAM(s) IV Intermittent every 12 hours    Other Medications Reviewed    Vital Signs Last 24 Hrs  T(F): 99.5 (18 Oct 2018 17:56), Max: 99.5 (18 Oct 2018 17:56)  HR: 118 (18 Oct 2018 17:56) (89 - 118)  BP: 163/79 (18 Oct 2018 17:56) (100/68 - 163/79)  BP(mean): --  RR: 18 (18 Oct 2018 17:56) (18 - 20)  SpO2: 93% (18 Oct 2018 17:56) (93% - 95%)    PHYSICAL EXAM:  General: alert, no acute distress  Eyes:  anicteric, no conjunctival injection, no discharge  Oropharynx: no lesions or injection 	  Neck: supple, without adenopathy  Lungs: rhonchi anteriorly   Heart: regular rate and rhythm; no murmur, rubs or gallops  Abdomen: soft, nondistended, nontender, without mass or organomegaly  Skin: no lesions  Extremities: L LE contracture,   Neurologic: alert, L weakness    LAB RESULTS:                        12.1   12.8  )-----------( 198      ( 18 Oct 2018 08:45 )             37.9   10-18    146<H>  |  107  |  24<H>  ----------------------------<  159<H>  3.5   |  23  |  0.70    Ca    9.4      18 Oct 2018 08:42    MICROBIOLOGY:  RECENT CULTURES:  Culture - Blood in AM (10.17.18 @ 15:39)    Specimen Source: .Blood Blood    Culture Results:   No growth to date.    10-16 @ 13:21 .Blood Blood-Peripheral     No growth to date.    10-16 @ 13:20 .Blood Blood-Peripheral     No growth to date.    10-15 @ 16:54 .Urine Clean Catch (Midstream)     <10,000 CFU/ml Normal Urogenital ck present    Culture - Blood (10.15.18 @ 15:38)    Gram Stain:   Growth in aerobic bottle: Gram Positive Cocci in Clusters  Growth in anaerobic bottle: Gram Positive Cocci in Clusters    -  Methicillin resistant Staphylococcus aureus (MRSA): Detec      RADIOLOGY REVIEWED:  CT Chest No Cont (10.16.18 @ 22:21) >  Multifocal pneumonia. Follow-up to resolution recommended.    Indeterminateright breast nodule is new from the prior study,   ultrasound/mammography needed for complete evaluation.

## 2018-10-18 NOTE — PROGRESS NOTE ADULT - ASSESSMENT
82 year old female PMH recent hemorrhagic CVA, PEG,  HTN, MI,      HLD, gout   p/w SOB and  acute respiratory distress/ bipap  in er/ ,  elevated wbc and  hypokalemia.    probable aspiration pma/  gram negative  pna/on admission,  was on iv  zosyn  dementia  by  history   ct chest ,  multifocal pna   npo/ on peg feedings  on  synthroid, Kepra , celexa  on dvt ppx.   now  with  MRSA bacteremia/ on vanco   echo, no vegetations  ID to f/p 82 year old female PMH recent hemorrhagic CVA, PEG,  HTN, MI,      HLD, gout   p/w SOB and  acute respiratory distress/ bipap  in er/ ,  elevated wbc and  hypokalemia.    probable aspiration pma/  gram negative  pna/on admission,  was on iv  zosyn  dementia  by  history   ct chest ,  multifocal pna   npo/ on peg feedings  on  synthroid, Kepra , celexa  on dvt ppx.   now  with  MRSA bacteremia/ on vanco,    rpt  blood  c/s, no growth   echo, no vegetations  ID to f/p. await duration of ab

## 2018-10-19 LAB
-  AMPICILLIN/SULBACTAM: SIGNIFICANT CHANGE UP
-  CEFAZOLIN: SIGNIFICANT CHANGE UP
-  CLINDAMYCIN: SIGNIFICANT CHANGE UP
-  ERYTHROMYCIN: SIGNIFICANT CHANGE UP
-  GENTAMICIN: SIGNIFICANT CHANGE UP
-  OXACILLIN: SIGNIFICANT CHANGE UP
-  RIFAMPIN: SIGNIFICANT CHANGE UP
-  TETRACYCLINE: SIGNIFICANT CHANGE UP
-  TRIMETHOPRIM/SULFAMETHOXAZOLE: SIGNIFICANT CHANGE UP
-  VANCOMYCIN: SIGNIFICANT CHANGE UP
ANION GAP SERPL CALC-SCNC: 14 MMOL/L — SIGNIFICANT CHANGE UP (ref 5–17)
BASE EXCESS BLDA CALC-SCNC: 2.2 MMOL/L — HIGH (ref -2–2)
BASOPHILS # BLD AUTO: 0 K/UL — SIGNIFICANT CHANGE UP (ref 0–0.2)
BUN SERPL-MCNC: 18 MG/DL — SIGNIFICANT CHANGE UP (ref 7–23)
CALCIUM SERPL-MCNC: 9.2 MG/DL — SIGNIFICANT CHANGE UP (ref 8.4–10.5)
CHLORIDE SERPL-SCNC: 106 MMOL/L — SIGNIFICANT CHANGE UP (ref 96–108)
CO2 BLDA-SCNC: 26 MMOL/L — SIGNIFICANT CHANGE UP (ref 22–30)
CO2 SERPL-SCNC: 24 MMOL/L — SIGNIFICANT CHANGE UP (ref 22–31)
CREAT SERPL-MCNC: 0.61 MG/DL — SIGNIFICANT CHANGE UP (ref 0.5–1.3)
CULTURE RESULTS: SIGNIFICANT CHANGE UP
EOSINOPHIL # BLD AUTO: 0 K/UL — SIGNIFICANT CHANGE UP (ref 0–0.5)
EOSINOPHIL NFR BLD AUTO: 1 % — SIGNIFICANT CHANGE UP (ref 0–6)
GLUCOSE SERPL-MCNC: 224 MG/DL — HIGH (ref 70–99)
HCO3 BLDA-SCNC: 25 MMOL/L — SIGNIFICANT CHANGE UP (ref 21–29)
HCT VFR BLD CALC: 38 % — SIGNIFICANT CHANGE UP (ref 34.5–45)
HGB BLD-MCNC: 11.3 G/DL — LOW (ref 11.5–15.5)
HOROWITZ INDEX BLDA+IHG-RTO: 55 — SIGNIFICANT CHANGE UP
LYMPHOCYTES # BLD AUTO: 1.1 K/UL — SIGNIFICANT CHANGE UP (ref 1–3.3)
LYMPHOCYTES # BLD AUTO: 8 % — LOW (ref 13–44)
MAGNESIUM SERPL-MCNC: 2.4 MG/DL — SIGNIFICANT CHANGE UP (ref 1.6–2.6)
MCHC RBC-ENTMCNC: 29.2 PG — SIGNIFICANT CHANGE UP (ref 27–34)
MCHC RBC-ENTMCNC: 29.6 GM/DL — LOW (ref 32–36)
MCV RBC AUTO: 98.8 FL — SIGNIFICANT CHANGE UP (ref 80–100)
METHOD TYPE: SIGNIFICANT CHANGE UP
MONOCYTES # BLD AUTO: 1 K/UL — HIGH (ref 0–0.9)
MONOCYTES NFR BLD AUTO: 5 % — SIGNIFICANT CHANGE UP (ref 2–14)
NEUTROPHILS # BLD AUTO: 12.5 K/UL — HIGH (ref 1.8–7.4)
NEUTROPHILS NFR BLD AUTO: 76 % — SIGNIFICANT CHANGE UP (ref 43–77)
ORGANISM # SPEC MICROSCOPIC CNT: SIGNIFICANT CHANGE UP
PCO2 BLDA: 33 MMHG — SIGNIFICANT CHANGE UP (ref 32–46)
PH BLDA: 7.49 — HIGH (ref 7.35–7.45)
PHOSPHATE SERPL-MCNC: 2.9 MG/DL — SIGNIFICANT CHANGE UP (ref 2.5–4.5)
PLATELET # BLD AUTO: 248 K/UL — SIGNIFICANT CHANGE UP (ref 150–400)
PO2 BLDA: 58 MMHG — LOW (ref 74–108)
POTASSIUM SERPL-MCNC: 3 MMOL/L — LOW (ref 3.5–5.3)
POTASSIUM SERPL-SCNC: 3 MMOL/L — LOW (ref 3.5–5.3)
RBC # BLD: 3.85 M/UL — SIGNIFICANT CHANGE UP (ref 3.8–5.2)
RBC # FLD: 14 % — SIGNIFICANT CHANGE UP (ref 10.3–14.5)
SAO2 % BLDA: 90 % — LOW (ref 92–96)
SODIUM SERPL-SCNC: 144 MMOL/L — SIGNIFICANT CHANGE UP (ref 135–145)
SPECIMEN SOURCE: SIGNIFICANT CHANGE UP
VANCOMYCIN TROUGH SERPL-MCNC: 24.7 UG/ML — HIGH (ref 10–20)
WBC # BLD: 14.6 K/UL — HIGH (ref 3.8–10.5)
WBC # FLD AUTO: 14.6 K/UL — HIGH (ref 3.8–10.5)

## 2018-10-19 PROCEDURE — 71045 X-RAY EXAM CHEST 1 VIEW: CPT | Mod: 26

## 2018-10-19 RX ORDER — IPRATROPIUM/ALBUTEROL SULFATE 18-103MCG
3 AEROSOL WITH ADAPTER (GRAM) INHALATION ONCE
Qty: 0 | Refills: 0 | Status: COMPLETED | OUTPATIENT
Start: 2018-10-19 | End: 2018-10-19

## 2018-10-19 RX ORDER — VANCOMYCIN HCL 1 G
750 VIAL (EA) INTRAVENOUS EVERY 12 HOURS
Qty: 0 | Refills: 0 | Status: DISCONTINUED | OUTPATIENT
Start: 2018-10-19 | End: 2018-10-22

## 2018-10-19 RX ORDER — CEFEPIME 1 G/1
1000 INJECTION, POWDER, FOR SOLUTION INTRAMUSCULAR; INTRAVENOUS ONCE
Qty: 0 | Refills: 0 | Status: COMPLETED | OUTPATIENT
Start: 2018-10-19 | End: 2018-10-19

## 2018-10-19 RX ORDER — SALIVA SUBSTITUTE COMB NO.11 351 MG
5 POWDER IN PACKET (EA) MUCOUS MEMBRANE
Qty: 0 | Refills: 0 | Status: DISCONTINUED | OUTPATIENT
Start: 2018-10-19 | End: 2018-11-01

## 2018-10-19 RX ORDER — POTASSIUM CHLORIDE 20 MEQ
40 PACKET (EA) ORAL ONCE
Qty: 0 | Refills: 0 | Status: COMPLETED | OUTPATIENT
Start: 2018-10-19 | End: 2018-10-19

## 2018-10-19 RX ORDER — CEFEPIME 1 G/1
1000 INJECTION, POWDER, FOR SOLUTION INTRAMUSCULAR; INTRAVENOUS EVERY 12 HOURS
Qty: 0 | Refills: 0 | Status: DISCONTINUED | OUTPATIENT
Start: 2018-10-19 | End: 2018-10-29

## 2018-10-19 RX ORDER — CEFEPIME 1 G/1
INJECTION, POWDER, FOR SOLUTION INTRAMUSCULAR; INTRAVENOUS
Qty: 0 | Refills: 0 | Status: DISCONTINUED | OUTPATIENT
Start: 2018-10-19 | End: 2018-10-29

## 2018-10-19 RX ADMIN — Medication 3 MILLILITER(S): at 21:55

## 2018-10-19 RX ADMIN — ALBUTEROL 1.25 MILLIGRAM(S): 90 AEROSOL, METERED ORAL at 18:14

## 2018-10-19 RX ADMIN — ALBUTEROL 1.25 MILLIGRAM(S): 90 AEROSOL, METERED ORAL at 12:03

## 2018-10-19 RX ADMIN — HEPARIN SODIUM 5000 UNIT(S): 5000 INJECTION INTRAVENOUS; SUBCUTANEOUS at 18:14

## 2018-10-19 RX ADMIN — Medication 500 MICROGRAM(S): at 23:31

## 2018-10-19 RX ADMIN — Medication 500 MICROGRAM(S): at 18:14

## 2018-10-19 RX ADMIN — Medication 40 MILLIEQUIVALENT(S): at 10:39

## 2018-10-19 RX ADMIN — HEPARIN SODIUM 5000 UNIT(S): 5000 INJECTION INTRAVENOUS; SUBCUTANEOUS at 05:17

## 2018-10-19 RX ADMIN — CHLORHEXIDINE GLUCONATE 1 APPLICATION(S): 213 SOLUTION TOPICAL at 10:39

## 2018-10-19 RX ADMIN — Medication 250 MILLIGRAM(S): at 23:30

## 2018-10-19 RX ADMIN — Medication 300 MILLIGRAM(S): at 12:01

## 2018-10-19 RX ADMIN — LEVETIRACETAM 750 MILLIGRAM(S): 250 TABLET, FILM COATED ORAL at 05:14

## 2018-10-19 RX ADMIN — LEVETIRACETAM 750 MILLIGRAM(S): 250 TABLET, FILM COATED ORAL at 18:14

## 2018-10-19 RX ADMIN — Medication 50 MICROGRAM(S): at 05:15

## 2018-10-19 RX ADMIN — AMLODIPINE BESYLATE 10 MILLIGRAM(S): 2.5 TABLET ORAL at 05:15

## 2018-10-19 RX ADMIN — Medication 25 MILLIGRAM(S): at 05:15

## 2018-10-19 RX ADMIN — Medication 25 MILLIGRAM(S): at 18:17

## 2018-10-19 RX ADMIN — CITALOPRAM 40 MILLIGRAM(S): 10 TABLET, FILM COATED ORAL at 12:01

## 2018-10-19 RX ADMIN — ALBUTEROL 1.25 MILLIGRAM(S): 90 AEROSOL, METERED ORAL at 05:14

## 2018-10-19 RX ADMIN — PANTOPRAZOLE SODIUM 40 MILLIGRAM(S): 20 TABLET, DELAYED RELEASE ORAL at 12:01

## 2018-10-19 RX ADMIN — Medication 250 MILLIGRAM(S): at 10:39

## 2018-10-19 RX ADMIN — Medication 500 MICROGRAM(S): at 12:02

## 2018-10-19 RX ADMIN — CEFEPIME 100 MILLIGRAM(S): 1 INJECTION, POWDER, FOR SOLUTION INTRAMUSCULAR; INTRAVENOUS at 16:28

## 2018-10-19 RX ADMIN — Medication 5 MILLILITER(S): at 23:30

## 2018-10-19 RX ADMIN — Medication 500 MICROGRAM(S): at 05:14

## 2018-10-19 NOTE — PROGRESS NOTE ADULT - SUBJECTIVE AND OBJECTIVE BOX
CC: f/u for MRSA bacteremia, pneumonia    Patient reports no change in cough, BiPAP at night, hypoxia    REVIEW OF SYSTEMS:  All other review of systems negative (Comprehensive ROS)- limited    Antimicrobials Day # 5  vancomycin  IVPB 1000 milliGRAM(s) IV Intermittent every 12 hours    Other Medications Reviewed    Vital Signs Last 24 Hrs  T(F): 98 (19 Oct 2018 10:00), Max: 99.5 (18 Oct 2018 17:56)  HR: 105 (19 Oct 2018 10:29) (90 - 118)  BP: 154/84 (19 Oct 2018 10:00) (136/71 - 163/79)  BP(mean): --  RR: 18 (19 Oct 2018 10:00) (18 - 24)  SpO2: 92% (19 Oct 2018 10:29) (91% - 97%)    PHYSICAL EXAM:  General: alert, no acute distress  Eyes:  anicteric, no conjunctival injection, no discharge  Oropharynx: no lesions or injection 	  Neck: supple, without adenopathy  Lungs: rhonchi anteriorly   Heart: regular rate and rhythm; no murmur, rubs or gallops  Abdomen: soft, nondistended, nontender, without mass or organomegaly  Skin: no lesions  Extremities: L LE contracture,   Neurologic: alert, L weakness    LAB RESULTS:                        11.3   14.6  )-----------( 248      ( 19 Oct 2018 07:16 )             38.0   10-19    144  |  106  |  18  ----------------------------<  224<H>  3.0<L>   |  24  |  0.61    Ca    9.2      19 Oct 2018 07:14  Phos  2.9     10-19  Mg     2.4     10-19      MICROBIOLOGY:  RECENT CULTURES:  Culture - Blood in AM (10.17.18 @ 15:39)    Specimen Source: .Blood Blood    Culture Results:   No growth to date.    10-16 @ 13:21 .Blood Blood-Peripheral     No growth to date.    10-16 @ 13:20 .Blood Blood-Peripheral     No growth to date.    10-15 @ 16:54 .Urine Clean Catch (Midstream)     <10,000 CFU/ml Normal Urogenital ck present    Culture - Blood (10.15.18 @ 15:38)    Gram Stain:   Growth in aerobic bottle: Gram Positive Cocci in Clusters  Growth in anaerobic bottle: Gram Positive Cocci in Clusters    Specimen Source: .Blood Blood-Peripheral    Culture Results:   Growth in aerobic and anaerobic bottles: Staphylococcus haemolyticus  See previous culture [id number]    Culture - Blood (10.15.18 @ 15:38)    -  Cefazolin: S <=4    -  Erythromycin: R >4    -  Ampicillin/Sulbactam: S <=8/4    -  Clindamycin: S <=0.25    -  RIF- Rifampin: S <=1 Should not be used as monotherapy    -  Tetra/Doxy: S <=1    -  Gentamicin: S <=1 Should not be used as monotherapy    -  Oxacillin: S <=0.25    -  Trimethoprim/Sulfamethoxazole: S <=0.5/9.5    -  Vancomycin: S 2    Gram Stain:   Growth in aerobic bottle: Gram Positive Cocci in Clusters  Growth in anaerobic bottle: Gram Positive Cocci in Clusters    -  Methicillin resistant Staphylococcus aureus (MRSA): Detec    Specimen Source: .Blood Blood-Peripheral    Organism: Coag Negative Staphylococcus    Organism: Blood Culture PCR    Culture Results:   Growth in aerobic and anaerobic bottles: Staphylococcus haemolyticus  Growth in anaerobic bottle: Staphylococcus epidermidis "Susceptibilities  not performed"        RADIOLOGY REVIEWED:  CT Chest No Cont (10.16.18 @ 22:21) >  Multifocal pneumonia. Follow-up to resolution recommended.    Indeterminateright breast nodule is new from the prior study,   ultrasound/mammography needed for complete evaluation. CC: f/u for pneumonia    Patient reports no change in cough, BiPAP at night, hypoxia    REVIEW OF SYSTEMS:  All other review of systems negative (Comprehensive ROS)- limited    Antimicrobials Day # 5  vancomycin  IVPB 1000 milliGRAM(s) IV Intermittent every 12 hours    Other Medications Reviewed    Vital Signs Last 24 Hrs  T(F): 98 (19 Oct 2018 10:00), Max: 99.5 (18 Oct 2018 17:56)  HR: 105 (19 Oct 2018 10:29) (90 - 118)  BP: 154/84 (19 Oct 2018 10:00) (136/71 - 163/79)  BP(mean): --  RR: 18 (19 Oct 2018 10:00) (18 - 24)  SpO2: 92% (19 Oct 2018 10:29) (91% - 97%)    PHYSICAL EXAM:  General: alert, no acute distress  Eyes:  anicteric, no conjunctival injection, no discharge  Oropharynx: no lesions or injection 	  Neck: supple, without adenopathy  Lungs: rhonchi anteriorly   Heart: regular rate and rhythm; no murmur, rubs or gallops  Abdomen: soft, nondistended, nontender, without mass or organomegaly  Skin: no lesions  Extremities: L LE contracture,   Neurologic: alert, L weakness    LAB RESULTS:                        11.3   14.6  )-----------( 248      ( 19 Oct 2018 07:16 )             38.0   10-19    144  |  106  |  18  ----------------------------<  224<H>  3.0<L>   |  24  |  0.61    Ca    9.2      19 Oct 2018 07:14  Phos  2.9     10-19  Mg     2.4     10-19      MICROBIOLOGY:  RECENT CULTURES:  Culture - Blood in AM (10.17.18 @ 15:39)    Specimen Source: .Blood Blood    Culture Results:   No growth to date.    10-16 @ 13:21 .Blood Blood-Peripheral     No growth to date.    10-16 @ 13:20 .Blood Blood-Peripheral     No growth to date.    10-15 @ 16:54 .Urine Clean Catch (Midstream)     <10,000 CFU/ml Normal Urogenital ck present    Culture - Blood (10.15.18 @ 15:38)    Gram Stain:   Growth in aerobic bottle: Gram Positive Cocci in Clusters  Growth in anaerobic bottle: Gram Positive Cocci in Clusters    Specimen Source: .Blood Blood-Peripheral    Culture Results:   Growth in aerobic and anaerobic bottles: Staphylococcus haemolyticus  See previous culture [id number]    Culture - Blood (10.15.18 @ 15:38)    -  Cefazolin: S <=4    -  Erythromycin: R >4    -  Ampicillin/Sulbactam: S <=8/4    -  Clindamycin: S <=0.25    -  RIF- Rifampin: S <=1 Should not be used as monotherapy    -  Tetra/Doxy: S <=1    -  Gentamicin: S <=1 Should not be used as monotherapy    -  Oxacillin: S <=0.25    -  Trimethoprim/Sulfamethoxazole: S <=0.5/9.5    -  Vancomycin: S 2    Gram Stain:   Growth in aerobic bottle: Gram Positive Cocci in Clusters  Growth in anaerobic bottle: Gram Positive Cocci in Clusters    -  Methicillin resistant Staphylococcus aureus (MRSA): Detec    Specimen Source: .Blood Blood-Peripheral    Organism: Coag Negative Staphylococcus    Organism: Blood Culture PCR    Culture Results:   Growth in aerobic and anaerobic bottles: Staphylococcus haemolyticus  Growth in anaerobic bottle: Staphylococcus epidermidis "Susceptibilities  not performed"        RADIOLOGY REVIEWED:  CT Chest No Cont (10.16.18 @ 22:21) >  Multifocal pneumonia. Follow-up to resolution recommended.    Indeterminateright breast nodule is new from the prior study,   ultrasound/mammography needed for complete evaluation.

## 2018-10-19 NOTE — PROGRESS NOTE ADULT - SUBJECTIVE AND OBJECTIVE BOX
in bed. no compalints    REVIEW OF SYSTEMS:  GEN: no fever,    no chills  RESP: no SOB,   no cough  CVS: no chest pain,   no palpitations  GI: no abdominal pain,   no nausea,   no vomiting,   no constipation,   no diarrhea  : no dysuria,   no frequency  NEURO: no headache,   no dizziness  PSYCH: no depression,   not anxious  Derm : no rash    MEDICATIONS  (STANDING):  ALBUTerol    90 MICROgram(s) HFA Inhaler 1 Puff(s) Inhalation every 4 hours  ALBUTerol   0.042% 1.25 milliGRAM(s) Nebulizer every 6 hours  allopurinol 300 milliGRAM(s) Oral daily  amLODIPine   Tablet 10 milliGRAM(s) Oral daily  chlorhexidine 4% Liquid 1 Application(s) Topical <User Schedule>  citalopram 40 milliGRAM(s) Oral daily  heparin  Injectable 5000 Unit(s) SubCutaneous every 12 hours  ipratropium    for Nebulization 500 MICROGram(s) Nebulizer every 6 hours  levETIRAcetam  Solution 750 milliGRAM(s) Oral two times a day  levothyroxine 50 MICROGram(s) Oral daily  metoprolol tartrate 25 milliGRAM(s) Oral two times a day  pantoprazole   Suspension 40 milliGRAM(s) Oral daily  potassium chloride   Solution 40 milliEquivalent(s) Oral once  tiotropium 18 MICROgram(s) Capsule 1 Capsule(s) Inhalation daily  vancomycin  IVPB 1000 milliGRAM(s) IV Intermittent every 12 hours    MEDICATIONS  (PRN):      Vital Signs Last 24 Hrs  T(C): 37.1 (19 Oct 2018 04:52), Max: 37.5 (18 Oct 2018 17:56)  T(F): 98.7 (19 Oct 2018 04:52), Max: 99.5 (18 Oct 2018 17:56)  HR: 108 (19 Oct 2018 06:10) (90 - 118)  BP: 138/84 (19 Oct 2018 04:52) (136/71 - 163/79)  BP(mean): --  RR: 24 (19 Oct 2018 04:52) (18 - 24)  SpO2: 91% (19 Oct 2018 06:10) (91% - 94%)  CAPILLARY BLOOD GLUCOSE        I&O's Summary    18 Oct 2018 07:01  -  19 Oct 2018 07:00  --------------------------------------------------------  IN: 1100 mL / OUT: 0 mL / NET: 1100 mL        PHYSICAL EXAM:  HEAD:  Atraumatic, Normocephalic  NECK: Supple, No   JVD  CHEST/LUNG:   no     rales,     no,    rhonchi  HEART: Regular rate and rhythm;         murmur  ABDOMEN: Soft, Nontender, ;   EXTREMITIES:     no   edema  NEUROLOGY:  alert    LABS:                        11.3   14.6  )-----------( 248      ( 19 Oct 2018 07:16 )             38.0     10-19    144  |  106  |  18  ----------------------------<  224<H>  3.0<L>   |  24  |  0.61    Ca    9.2      19 Oct 2018 07:14  Phos  2.9     10-19  Mg     2.4     10-19                ABG - ( 18 Oct 2018 11:01 )  pH, Arterial: 7.47  pH, Blood: x     /  pCO2: 34    /  pO2: 71    / HCO3: 25    / Base Excess: 1.8   /  SaO2: 96                10-17 @ 09:06  2.8  70    Hemoglobin A1C, Whole Blood: 5.6 % (10-01 @ 07:40)    Thyroid Stimulating Hormone, Serum: 1.30 uIU/mL (10-01 @ 07:40)          Consultant(s) Notes Reviewed:      Care Discussed with Consultants/Other Providers: in bed. no complaints  bit hypoxic, suctioned, o2 sat, 91 now    REVIEW OF SYSTEMS:  GEN: no fever,    no chills  RESP: no SOB,   no cough  CVS: no chest pain,   no palpitations  GI: no abdominal pain,   no nausea,   no vomiting,   no constipation,   no diarrhea  : no dysuria,   no frequency  NEURO: no headache,   no dizziness  PSYCH: no depression,   not anxious  Derm : no rash    MEDICATIONS  (STANDING):  ALBUTerol    90 MICROgram(s) HFA Inhaler 1 Puff(s) Inhalation every 4 hours  ALBUTerol   0.042% 1.25 milliGRAM(s) Nebulizer every 6 hours  allopurinol 300 milliGRAM(s) Oral daily  amLODIPine   Tablet 10 milliGRAM(s) Oral daily  chlorhexidine 4% Liquid 1 Application(s) Topical <User Schedule>  citalopram 40 milliGRAM(s) Oral daily  heparin  Injectable 5000 Unit(s) SubCutaneous every 12 hours  ipratropium    for Nebulization 500 MICROGram(s) Nebulizer every 6 hours  levETIRAcetam  Solution 750 milliGRAM(s) Oral two times a day  levothyroxine 50 MICROGram(s) Oral daily  metoprolol tartrate 25 milliGRAM(s) Oral two times a day  pantoprazole   Suspension 40 milliGRAM(s) Oral daily  potassium chloride   Solution 40 milliEquivalent(s) Oral once  tiotropium 18 MICROgram(s) Capsule 1 Capsule(s) Inhalation daily  vancomycin  IVPB 1000 milliGRAM(s) IV Intermittent every 12 hours    MEDICATIONS  (PRN):      Vital Signs Last 24 Hrs  T(C): 37.1 (19 Oct 2018 04:52), Max: 37.5 (18 Oct 2018 17:56)  T(F): 98.7 (19 Oct 2018 04:52), Max: 99.5 (18 Oct 2018 17:56)  HR: 108 (19 Oct 2018 06:10) (90 - 118)  BP: 138/84 (19 Oct 2018 04:52) (136/71 - 163/79)  BP(mean): --  RR: 24 (19 Oct 2018 04:52) (18 - 24)  SpO2: 91% (19 Oct 2018 06:10) (91% - 94%)  CAPILLARY BLOOD GLUCOSE        I&O's Summary    18 Oct 2018 07:01  -  19 Oct 2018 07:00  --------------------------------------------------------  IN: 1100 mL / OUT: 0 mL / NET: 1100 mL        PHYSICAL EXAM:  HEAD:  Atraumatic, Normocephalic  NECK: Supple, No   JVD  CHEST/LUNG:   no     rales,     no,    rhonchi  HEART: Regular rate and rhythm;         murmur  ABDOMEN: Soft, Nontender, ;   EXTREMITIES:     no   edema  NEUROLOGY:  alert    LABS:                        11.3   14.6  )-----------( 248      ( 19 Oct 2018 07:16 )             38.0     10-19    144  |  106  |  18  ----------------------------<  224<H>  3.0<L>   |  24  |  0.61    Ca    9.2      19 Oct 2018 07:14  Phos  2.9     10-19  Mg     2.4     10-19                ABG - ( 18 Oct 2018 11:01 )  pH, Arterial: 7.47  pH, Blood: x     /  pCO2: 34    /  pO2: 71    / HCO3: 25    / Base Excess: 1.8   /  SaO2: 96                10-17 @ 09:06  2.8  70    Hemoglobin A1C, Whole Blood: 5.6 % (10-01 @ 07:40)    Thyroid Stimulating Hormone, Serum: 1.30 uIU/mL (10-01 @ 07:40)          Consultant(s) Notes Reviewed:      Care Discussed with Consultants/Other Providers: in bed. no complaints  bit hypoxic, suctioned, o2 sat, 91 now  at [facility]/ long term resident. per daughter pt does not f/p with any particular dr    REVIEW OF SYSTEMS:  GEN: no fever,    no chills  RESP: no SOB,   no cough  CVS: no chest pain,   no palpitations  GI: no abdominal pain,   no nausea,   no vomiting,   no constipation,   no diarrhea  : no dysuria,   no frequency  NEURO: no headache,   no dizziness  PSYCH: no depression,   not anxious  Derm : no rash    MEDICATIONS  (STANDING):  ALBUTerol    90 MICROgram(s) HFA Inhaler 1 Puff(s) Inhalation every 4 hours  ALBUTerol   0.042% 1.25 milliGRAM(s) Nebulizer every 6 hours  allopurinol 300 milliGRAM(s) Oral daily  amLODIPine   Tablet 10 milliGRAM(s) Oral daily  chlorhexidine 4% Liquid 1 Application(s) Topical <User Schedule>  citalopram 40 milliGRAM(s) Oral daily  heparin  Injectable 5000 Unit(s) SubCutaneous every 12 hours  ipratropium    for Nebulization 500 MICROGram(s) Nebulizer every 6 hours  levETIRAcetam  Solution 750 milliGRAM(s) Oral two times a day  levothyroxine 50 MICROGram(s) Oral daily  metoprolol tartrate 25 milliGRAM(s) Oral two times a day  pantoprazole   Suspension 40 milliGRAM(s) Oral daily  potassium chloride   Solution 40 milliEquivalent(s) Oral once  tiotropium 18 MICROgram(s) Capsule 1 Capsule(s) Inhalation daily  vancomycin  IVPB 1000 milliGRAM(s) IV Intermittent every 12 hours    MEDICATIONS  (PRN):      Vital Signs Last 24 Hrs  T(C): 37.1 (19 Oct 2018 04:52), Max: 37.5 (18 Oct 2018 17:56)  T(F): 98.7 (19 Oct 2018 04:52), Max: 99.5 (18 Oct 2018 17:56)  HR: 108 (19 Oct 2018 06:10) (90 - 118)  BP: 138/84 (19 Oct 2018 04:52) (136/71 - 163/79)  BP(mean): --  RR: 24 (19 Oct 2018 04:52) (18 - 24)  SpO2: 91% (19 Oct 2018 06:10) (91% - 94%)  CAPILLARY BLOOD GLUCOSE        I&O's Summary    18 Oct 2018 07:01  -  19 Oct 2018 07:00  --------------------------------------------------------  IN: 1100 mL / OUT: 0 mL / NET: 1100 mL        PHYSICAL EXAM:  HEAD:  Atraumatic, Normocephalic  NECK: Supple, No   JVD  CHEST/LUNG:   no     rales,     no,    rhonchi  HEART: Regular rate and rhythm;         murmur  ABDOMEN: Soft, Nontender, ;   EXTREMITIES:     no   edema  NEUROLOGY:  alert    LABS:                        11.3   14.6  )-----------( 248      ( 19 Oct 2018 07:16 )             38.0     10-19    144  |  106  |  18  ----------------------------<  224<H>  3.0<L>   |  24  |  0.61    Ca    9.2      19 Oct 2018 07:14  Phos  2.9     10-19  Mg     2.4     10-19                ABG - ( 18 Oct 2018 11:01 )  pH, Arterial: 7.47  pH, Blood: x     /  pCO2: 34    /  pO2: 71    / HCO3: 25    / Base Excess: 1.8   /  SaO2: 96                10-17 @ 09:06  2.8  70    Hemoglobin A1C, Whole Blood: 5.6 % (10-01 @ 07:40)    Thyroid Stimulating Hormone, Serum: 1.30 uIU/mL (10-01 @ 07:40)          Consultant(s) Notes Reviewed:      Care Discussed with Consultants/Other Providers:

## 2018-10-19 NOTE — PROGRESS NOTE ADULT - ASSESSMENT
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 presents with fever, sob, and what was initially felt to be MRSA bacteremia- PCR +, but in d/w Micro, final report, admission Bld Cxs- no MRSA on plates, growth of 2 strains CoNS- S haemolyticus and S epidermitis; this best explained as gross contamination- all repeat Bld Cxs negative  Multifocal pneumonia now primary focus  Still alert, afebrile, but WBC back up, tenuous resp status  Vanco alone may not be adequate for other resp pathogens in this setting    Recommendations:  Continue vancomycin as part of regimen for HCAP  Will add Cefepime IV  Follow temps and CBC/diff   O2 and BiPAP support as outlined  d/w Dr Segundo  d/w NP 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 presents with fever, sob, and what was initially felt to be MRSA bacteremia- PCR +, but in d/w Micro, final report, admission Bld Cxs- no MRSA on plates, growth of 2 strains CoNS- S haemolyticus and S epidermitis; this best explained as gross contamination- all repeat Bld Cxs negative  False + MRSA PCR in setting of overgrowth of CoNS  Multifocal pneumonia now primary focus  Still alert, afebrile, but WBC back up, tenuous resp status  Vanco alone may not be adequate for other resp pathogens in this setting    Recommendations:  Continue vancomycin as part of regimen for HCAP  Will add Cefepime IV  Follow temps and CBC/diff   O2 and BiPAP support as outlined  d/w Dr Segundo  d/w NP

## 2018-10-19 NOTE — PROGRESS NOTE ADULT - ASSESSMENT
82 year old female PMH recent hemorrhagic CVA, PEG,  HTN, MI,      HLD, gout   p/w SOB and  acute respiratory distress/ bipap  in er/ ,  elevated wbc and  hypokalemia.    probable aspiration pma/  gram negative  pna/on admission,  was on iv  zosyn  dementia  by  history   ct chest ,  multifocal pna   npo/ on peg feedings  on  synthroid, Kepra , celexa  on dvt ppx.   now  with  MRSA bacteremia/ on vanco,    rpt  blood  c/s, no growth   echo, no vegetations  per  ID, needs 2 weeks of iv vanco/   hypokalemia, repleted  follow  wbc 82 year old female PMH recent hemorrhagic CVA, PEG,  HTN, MI,      HLD, gout   p/w SOB and  acute respiratory distress/ bipap  in er/ ,  elevated wbc and  hypokalemia.    probable aspiration pma/  gram negative  pna/on admission,  was on iv  zosyn  dementia  by  history   ct chest ,  multifocal pna   npo/ on peg feedings  on  synthroid, Kepra , celexa  on dvt ppx.   now  with  MRSA bacteremia/  staph  hemolyticus/  on vanco,    rpt  blood  c/s, no growth   echo, no vegetations  per  ID/ dr stone, , needs 2 weeks of iv vanco/   hypokalemia, repleted  picc  line, per  ID  follow  wbc 82 year old female PMH recent hemorrhagic CVA, PEG,  HTN, MI,      HLD, gout   p/w SOB and  acute respiratory distress/ bipap  in er/ ,  elevated wbc and  hypokalemia.    probable aspiration pma/  gram negative  pna/on admission,  was on iv  zosyn  dementia  by  history   ct chest ,  multifocal pna   npo/ on peg feedings  on  synthroid, Kepra , celexa  on dvt ppx.   now  with  MRSA bacteremia/  staph  hemolyticus/ CNS/  s, epidermidis,   on vanco,   mlple  organisms isolated, may  be c/w  contaminant. defer to  ID   rpt  blood  c/s, no growth   echo, no vegetations  per  ID/ dr stone  , will decide  on duaration of  ab.     hypokalemia, repleted    follow  wbc 82 year old female PMH recent hemorrhagic CVA, PEG,  HTN, MI,      HLD, gout   p/w SOB and  acute respiratory distress/ bipap  in er/ ,  elevated wbc and  hypokalemia.    probable aspiration pma/  gram negative  pna/on admission,  was on iv  zosyn  dementia  by  history   ct chest ,  multifocal pna   npo/ on peg feedings  on  synthroid, Kepra , celexa  on dvt ppx.   now  with  MRSA bacteremia/  staph  hemolyticus/ CNS/  s, epidermidis,   on vanco,   mlple  organisms isolated, may  be c/w  contaminant., discussed  with dr stone,  however  may still need  to rx MRSA, which cannot be dismissed as  a  contaminant,   defer to  ID   rpt  blood  c/s, no growth   echo, no vegetations  per  ID/ dr stone  , will decide  on duaration of  ab.     hypokalemia, repleted    follow  wbc

## 2018-10-19 NOTE — CHART NOTE - NSCHARTNOTEFT_GEN_A_CORE
s= called by RN to evaluate patient with 02 sat 88%  HPI:   83F hx CVA, bed bound, PEG dependent, HTN, HLD, BIBEMS for resp distress x1day.  associated with cough,  rigors, subjective fevers. no n/v no abd pain no cp.  pt d oes have a h/o have hx of aspiration pna.	     Past Medical History:  CVA (cerebral vascular accident)    Dyslipidemia    Gout    HTN (hypertension)    MI (myocardial infarction)    Personal history of asbestosis    UTI (lower urinary tract infection). (15 Oct 2018 16:54)      On going events : pt throughout the day requires frequent suctioning, however very adamantly refusing suctioning. I informed patient she is at risk of being intubated.    PE" awake and alert x3  Vital Signs Last 24 Hrs  T(C): 37.2 (19 Oct 2018 17:24), Max: 37.2 (18 Oct 2018 22:00)  T(F): 99 (19 Oct 2018 17:24), Max: 99 (18 Oct 2018 22:00)  HR: 90 (19 Oct 2018 19:26) (90 - 114)  BP: 140/83 (19 Oct 2018 17:24) (136/71 - 154/84)  BP(mean): --  RR: 18 (19 Oct 2018 17:24) (18 - 24)  SpO2: 93% (19 Oct 2018 19:26) (91% - 97%)  Heent: very dry mucosa  cv: s1 s2 regular rate but tachycardia 100's  Lung: scattered rhonchi, suctioned thick yellow secretions  GI: soft and peg in place  Ext: left leg and arm contracted                        11.3   14.6  )-----------( 248      ( 19 Oct 2018 07:16 )             38.0     Blood Gas Profile - Arterial (10.19.18 @ 19:30)    pH, Arterial: 7.49    pCO2, Arterial: 33 mmHg    pO2, Arterial: 58 mmHg    HCO3, Arterial: 25 mmol/L    Base Excess, Arterial: 2.2 mmol/L    Oxygen Saturation, Arterial: 90 %    Total CO2, Arterial: 26 mmoL/L    FIO2, Arterial: 55 on face mask , pt was changed to bipap  A/P 83 yrs old elderly compromised female with dx of multifocal pna, abx changed to cefipime, seen now for o2 sat 88, abg confirmed hypoxic  get cxr, most likely hypoxic from pna and poor airway clearance. Pt is at high risk for intubation . ]  Pt placed on bipap and will repeat abg on bipap for improvement, if no improvement get micu eval. Pt is a full code.   Continue nebs atc and consider pulm eval  will inform Dr Randle  of above, sign out given to night Np . s= called by RN to evaluate patient with 02 sat 88%  HPI:   83F hx CVA, bed bound, PEG dependent, HTN, HLD, BIBEMS for resp distress x1day.  associated with cough,  rigors, subjective fevers. no n/v no abd pain no cp.  pt d oes have a h/o have hx of aspiration pna.	     Past Medical History:  CVA (cerebral vascular accident)    Dyslipidemia    Gout    HTN (hypertension)    MI (myocardial infarction)    Personal history of asbestosis    UTI (lower urinary tract infection). (15 Oct 2018 16:54)      On going events : pt throughout the day requires frequent suctioning, however very adamantly refusing suctioning. I informed patient she is at risk of being intubated.    PE" awake and alert x3  Vital Signs Last 24 Hrs  T(C): 37.2 (19 Oct 2018 17:24), Max: 37.2 (18 Oct 2018 22:00)  T(F): 99 (19 Oct 2018 17:24), Max: 99 (18 Oct 2018 22:00)  HR: 90 (19 Oct 2018 19:26) (90 - 114)  BP: 140/83 (19 Oct 2018 17:24) (136/71 - 154/84)  BP(mean): --  RR: 18 (19 Oct 2018 17:24) (18 - 24)  SpO2: 93% (19 Oct 2018 19:26) (91% - 97%)  Heent: very dry mucosa  cv: s1 s2 regular rate but tachycardia 100's  Lung: scattered rhonchi, suctioned thick yellow secretions  GI: soft and peg in place  Ext: left leg and arm contracted                        11.3   14.6  )-----------( 248      ( 19 Oct 2018 07:16 )             38.0     Blood Gas Profile - Arterial (10.19.18 @ 19:30)    pH, Arterial: 7.49    pCO2, Arterial: 33 mmHg    pO2, Arterial: 58 mmHg    HCO3, Arterial: 25 mmol/L    Base Excess, Arterial: 2.2 mmol/L    Oxygen Saturation, Arterial: 90 %    Total CO2, Arterial: 26 mmoL/L    FIO2, Arterial: 55 on face mask , pt was changed to bipap  A/P 83 yrs old elderly compromised female with dx of multifocal pna, abx changed to cefipime, seen now for o2 sat 88, abg confirmed hypoxic  get cxr, most likely hypoxic from pna and poor airway clearance. Pt is at high risk for intubation . ]  Pt placed on bipap and will repeat abg on bipap for improvement, if no improvement get micu eval. Pt is a full code.   Continue nebs atc and consider pulm eval. add chest PT TO FACILITATE SPUTUM EXPECTORATION  will inform Dr Randle  of above, sign out given to night Np .

## 2018-10-20 DIAGNOSIS — R93.8 ABNORMAL FINDINGS ON DIAGNOSTIC IMAGING OF OTHER SPECIFIED BODY STRUCTURES: ICD-10-CM

## 2018-10-20 DIAGNOSIS — J69.0 PNEUMONITIS DUE TO INHALATION OF FOOD AND VOMIT: ICD-10-CM

## 2018-10-20 DIAGNOSIS — J96.01 ACUTE RESPIRATORY FAILURE WITH HYPOXIA: ICD-10-CM

## 2018-10-20 DIAGNOSIS — F03.90 UNSPECIFIED DEMENTIA WITHOUT BEHAVIORAL DISTURBANCE: ICD-10-CM

## 2018-10-20 DIAGNOSIS — F10.10 ALCOHOL ABUSE, UNCOMPLICATED: ICD-10-CM

## 2018-10-20 LAB
ALBUMIN SERPL ELPH-MCNC: 3.1 G/DL — LOW (ref 3.3–5)
ALP SERPL-CCNC: 120 U/L — SIGNIFICANT CHANGE UP (ref 40–120)
ALT FLD-CCNC: 17 U/L — SIGNIFICANT CHANGE UP (ref 10–45)
ANION GAP SERPL CALC-SCNC: 16 MMOL/L — SIGNIFICANT CHANGE UP (ref 5–17)
ANION GAP SERPL CALC-SCNC: 17 MMOL/L — SIGNIFICANT CHANGE UP (ref 5–17)
AST SERPL-CCNC: 15 U/L — SIGNIFICANT CHANGE UP (ref 10–40)
BILIRUB SERPL-MCNC: 0.4 MG/DL — SIGNIFICANT CHANGE UP (ref 0.2–1.2)
BUN SERPL-MCNC: 20 MG/DL — SIGNIFICANT CHANGE UP (ref 7–23)
BUN SERPL-MCNC: 21 MG/DL — SIGNIFICANT CHANGE UP (ref 7–23)
CALCIUM SERPL-MCNC: 9 MG/DL — SIGNIFICANT CHANGE UP (ref 8.4–10.5)
CALCIUM SERPL-MCNC: 9 MG/DL — SIGNIFICANT CHANGE UP (ref 8.4–10.5)
CHLORIDE SERPL-SCNC: 108 MMOL/L — SIGNIFICANT CHANGE UP (ref 96–108)
CHLORIDE SERPL-SCNC: 109 MMOL/L — HIGH (ref 96–108)
CO2 SERPL-SCNC: 22 MMOL/L — SIGNIFICANT CHANGE UP (ref 22–31)
CO2 SERPL-SCNC: 22 MMOL/L — SIGNIFICANT CHANGE UP (ref 22–31)
CREAT SERPL-MCNC: 0.69 MG/DL — SIGNIFICANT CHANGE UP (ref 0.5–1.3)
CREAT SERPL-MCNC: 0.71 MG/DL — SIGNIFICANT CHANGE UP (ref 0.5–1.3)
GAS PNL BLDA: SIGNIFICANT CHANGE UP
GAS PNL BLDA: SIGNIFICANT CHANGE UP
GAS PNL BLDV: SIGNIFICANT CHANGE UP
GLUCOSE BLDC GLUCOMTR-MCNC: 207 MG/DL — HIGH (ref 70–99)
GLUCOSE SERPL-MCNC: 188 MG/DL — HIGH (ref 70–99)
GLUCOSE SERPL-MCNC: 239 MG/DL — HIGH (ref 70–99)
HCT VFR BLD CALC: 33.5 % — LOW (ref 34.5–45)
HCT VFR BLD CALC: 34.5 % — SIGNIFICANT CHANGE UP (ref 34.5–45)
HGB BLD-MCNC: 10.6 G/DL — LOW (ref 11.5–15.5)
HGB BLD-MCNC: 11.2 G/DL — LOW (ref 11.5–15.5)
MCHC RBC-ENTMCNC: 31.6 GM/DL — LOW (ref 32–36)
MCHC RBC-ENTMCNC: 31.6 PG — SIGNIFICANT CHANGE UP (ref 27–34)
MCHC RBC-ENTMCNC: 31.9 PG — SIGNIFICANT CHANGE UP (ref 27–34)
MCHC RBC-ENTMCNC: 32.4 GM/DL — SIGNIFICANT CHANGE UP (ref 32–36)
MCV RBC AUTO: 100 FL — SIGNIFICANT CHANGE UP (ref 80–100)
MCV RBC AUTO: 98.4 FL — SIGNIFICANT CHANGE UP (ref 80–100)
PLATELET # BLD AUTO: 268 K/UL — SIGNIFICANT CHANGE UP (ref 150–400)
PLATELET # BLD AUTO: 272 K/UL — SIGNIFICANT CHANGE UP (ref 150–400)
POTASSIUM SERPL-MCNC: 3.1 MMOL/L — LOW (ref 3.5–5.3)
POTASSIUM SERPL-MCNC: 3.4 MMOL/L — LOW (ref 3.5–5.3)
POTASSIUM SERPL-SCNC: 3.1 MMOL/L — LOW (ref 3.5–5.3)
POTASSIUM SERPL-SCNC: 3.4 MMOL/L — LOW (ref 3.5–5.3)
PROT SERPL-MCNC: 7.7 G/DL — SIGNIFICANT CHANGE UP (ref 6–8.3)
RBC # BLD: 3.35 M/UL — LOW (ref 3.8–5.2)
RBC # BLD: 3.5 M/UL — LOW (ref 3.8–5.2)
RBC # FLD: 14.1 % — SIGNIFICANT CHANGE UP (ref 10.3–14.5)
RBC # FLD: 15.5 % — HIGH (ref 10.3–14.5)
SODIUM SERPL-SCNC: 146 MMOL/L — HIGH (ref 135–145)
SODIUM SERPL-SCNC: 148 MMOL/L — HIGH (ref 135–145)
WBC # BLD: 15.71 K/UL — HIGH (ref 3.8–10.5)
WBC # BLD: 16.6 K/UL — HIGH (ref 3.8–10.5)
WBC # FLD AUTO: 15.71 K/UL — HIGH (ref 3.8–10.5)
WBC # FLD AUTO: 16.6 K/UL — HIGH (ref 3.8–10.5)

## 2018-10-20 PROCEDURE — 93010 ELECTROCARDIOGRAM REPORT: CPT

## 2018-10-20 PROCEDURE — 71045 X-RAY EXAM CHEST 1 VIEW: CPT | Mod: 26

## 2018-10-20 PROCEDURE — 99223 1ST HOSP IP/OBS HIGH 75: CPT | Mod: GC

## 2018-10-20 RX ORDER — FUROSEMIDE 40 MG
20 TABLET ORAL DAILY
Qty: 0 | Refills: 0 | Status: DISCONTINUED | OUTPATIENT
Start: 2018-10-20 | End: 2018-10-20

## 2018-10-20 RX ORDER — AZITHROMYCIN 500 MG/1
500 TABLET, FILM COATED ORAL ONCE
Qty: 0 | Refills: 0 | Status: COMPLETED | OUTPATIENT
Start: 2018-10-20 | End: 2018-10-20

## 2018-10-20 RX ORDER — POTASSIUM CHLORIDE 20 MEQ
40 PACKET (EA) ORAL ONCE
Qty: 0 | Refills: 0 | Status: COMPLETED | OUTPATIENT
Start: 2018-10-20 | End: 2018-10-20

## 2018-10-20 RX ORDER — ACETYLCYSTEINE 200 MG/ML
3 VIAL (ML) MISCELLANEOUS THREE TIMES A DAY
Qty: 0 | Refills: 0 | Status: DISCONTINUED | OUTPATIENT
Start: 2018-10-20 | End: 2018-10-22

## 2018-10-20 RX ORDER — AZITHROMYCIN 500 MG/1
TABLET, FILM COATED ORAL
Qty: 0 | Refills: 0 | Status: DISCONTINUED | OUTPATIENT
Start: 2018-10-20 | End: 2018-10-24

## 2018-10-20 RX ORDER — AZITHROMYCIN 500 MG/1
500 TABLET, FILM COATED ORAL EVERY 24 HOURS
Qty: 0 | Refills: 0 | Status: DISCONTINUED | OUTPATIENT
Start: 2018-10-21 | End: 2018-10-24

## 2018-10-20 RX ORDER — FUROSEMIDE 40 MG
20 TABLET ORAL DAILY
Qty: 0 | Refills: 0 | Status: DISCONTINUED | OUTPATIENT
Start: 2018-10-20 | End: 2018-10-21

## 2018-10-20 RX ORDER — SODIUM CHLORIDE 9 MG/ML
1000 INJECTION, SOLUTION INTRAVENOUS
Qty: 0 | Refills: 0 | Status: DISCONTINUED | OUTPATIENT
Start: 2018-10-20 | End: 2018-10-21

## 2018-10-20 RX ADMIN — HEPARIN SODIUM 5000 UNIT(S): 5000 INJECTION INTRAVENOUS; SUBCUTANEOUS at 18:14

## 2018-10-20 RX ADMIN — CEFEPIME 100 MILLIGRAM(S): 1 INJECTION, POWDER, FOR SOLUTION INTRAMUSCULAR; INTRAVENOUS at 04:49

## 2018-10-20 RX ADMIN — Medication 500 MICROGRAM(S): at 05:10

## 2018-10-20 RX ADMIN — SODIUM CHLORIDE 50 MILLILITER(S): 9 INJECTION, SOLUTION INTRAVENOUS at 20:48

## 2018-10-20 RX ADMIN — LEVETIRACETAM 750 MILLIGRAM(S): 250 TABLET, FILM COATED ORAL at 18:00

## 2018-10-20 RX ADMIN — PANTOPRAZOLE SODIUM 40 MILLIGRAM(S): 20 TABLET, DELAYED RELEASE ORAL at 13:28

## 2018-10-20 RX ADMIN — Medication 20 MILLIGRAM(S): at 13:27

## 2018-10-20 RX ADMIN — AMLODIPINE BESYLATE 10 MILLIGRAM(S): 2.5 TABLET ORAL at 05:11

## 2018-10-20 RX ADMIN — Medication 40 MILLIEQUIVALENT(S): at 13:27

## 2018-10-20 RX ADMIN — ALBUTEROL 1.25 MILLIGRAM(S): 90 AEROSOL, METERED ORAL at 05:11

## 2018-10-20 RX ADMIN — Medication 250 MILLIGRAM(S): at 23:15

## 2018-10-20 RX ADMIN — ALBUTEROL 1.25 MILLIGRAM(S): 90 AEROSOL, METERED ORAL at 18:14

## 2018-10-20 RX ADMIN — CHLORHEXIDINE GLUCONATE 1 APPLICATION(S): 213 SOLUTION TOPICAL at 11:12

## 2018-10-20 RX ADMIN — Medication 3 MILLILITER(S): at 23:14

## 2018-10-20 RX ADMIN — Medication 5 MILLILITER(S): at 05:10

## 2018-10-20 RX ADMIN — ALBUTEROL 1.25 MILLIGRAM(S): 90 AEROSOL, METERED ORAL at 14:49

## 2018-10-20 RX ADMIN — Medication 300 MILLIGRAM(S): at 13:28

## 2018-10-20 RX ADMIN — LEVETIRACETAM 750 MILLIGRAM(S): 250 TABLET, FILM COATED ORAL at 05:11

## 2018-10-20 RX ADMIN — Medication 500 MICROGRAM(S): at 14:49

## 2018-10-20 RX ADMIN — Medication 50 MICROGRAM(S): at 05:11

## 2018-10-20 RX ADMIN — Medication 25 MILLIGRAM(S): at 05:11

## 2018-10-20 RX ADMIN — Medication 25 MILLIGRAM(S): at 18:00

## 2018-10-20 RX ADMIN — HEPARIN SODIUM 5000 UNIT(S): 5000 INJECTION INTRAVENOUS; SUBCUTANEOUS at 05:11

## 2018-10-20 RX ADMIN — AZITHROMYCIN 250 MILLIGRAM(S): 500 TABLET, FILM COATED ORAL at 20:47

## 2018-10-20 RX ADMIN — CEFEPIME 100 MILLIGRAM(S): 1 INJECTION, POWDER, FOR SOLUTION INTRAMUSCULAR; INTRAVENOUS at 18:15

## 2018-10-20 RX ADMIN — Medication 500 MICROGRAM(S): at 18:14

## 2018-10-20 RX ADMIN — ALBUTEROL 1.25 MILLIGRAM(S): 90 AEROSOL, METERED ORAL at 23:15

## 2018-10-20 RX ADMIN — CITALOPRAM 40 MILLIGRAM(S): 10 TABLET, FILM COATED ORAL at 13:28

## 2018-10-20 RX ADMIN — Medication 250 MILLIGRAM(S): at 12:50

## 2018-10-20 RX ADMIN — Medication 500 MICROGRAM(S): at 23:15

## 2018-10-20 RX ADMIN — Medication 3 MILLILITER(S): at 16:03

## 2018-10-20 NOTE — CONSULT NOTE ADULT - SUBJECTIVE AND OBJECTIVE BOX
PULMONARY CONSULT    Initial HPI on admission:   83F hx CVA, bed bound, PEG dependent, HTN, HLD, BIBEMS for resp distress x1day.  associated with cough,  rigors, subjective fevers. no n/v no abd pain no cp.  pt does have a h/o aspiration pna. Hypoxic, tachypneic. RRT called today for resp distress. Placed on Bilevel. Seen and rejected by MICU.      Past Medical History:  CVA (cerebral vascular accident)    Dyslipidemia    Gout    HTN (hypertension)    MI (myocardial infarction)    Personal history of asbestosis    UTI (lower urinary tract infection). (15 Oct 2018 16:54)    Allergies    Toradol (Urticaria (Mild to Mod); Rash (Mild to Mod))    Intolerances      FAMILY HISTORY:  No pertinent family history in first degree relatives    Social history: unable to assess    Review of Systems:  unable to assess while on bilevel    Medications:  MEDICATIONS  (STANDING):  acetylcysteine 10% Inhalation 3 milliLiter(s) Inhalation three times a day  ALBUTerol    90 MICROgram(s) HFA Inhaler 1 Puff(s) Inhalation every 4 hours  ALBUTerol   0.042% 1.25 milliGRAM(s) Nebulizer every 6 hours  allopurinol 300 milliGRAM(s) Oral daily  amLODIPine   Tablet 10 milliGRAM(s) Oral daily  Biotene Dry Mouth Oral Rinse 5 milliLiter(s) Swish and Spit four times a day  cefepime   IVPB      cefepime   IVPB 1000 milliGRAM(s) IV Intermittent every 12 hours  chlorhexidine 4% Liquid 1 Application(s) Topical <User Schedule>  citalopram 40 milliGRAM(s) Oral daily  furosemide   Injectable 20 milliGRAM(s) IV Push daily  heparin  Injectable 5000 Unit(s) SubCutaneous every 12 hours  ipratropium    for Nebulization 500 MICROGram(s) Nebulizer every 6 hours  levETIRAcetam  Solution 750 milliGRAM(s) Oral two times a day  levothyroxine 50 MICROGram(s) Oral daily  metoprolol tartrate 25 milliGRAM(s) Oral two times a day  pantoprazole   Suspension 40 milliGRAM(s) Oral daily  tiotropium 18 MICROgram(s) Capsule 1 Capsule(s) Inhalation daily  vancomycin  IVPB 750 milliGRAM(s) IV Intermittent every 12 hours    MEDICATIONS  (PRN):    Vital Signs Last 24 Hrs  T(C): 37 (20 Oct 2018 14:30), Max: 38.8 (20 Oct 2018 11:35)  T(F): 98.6 (20 Oct 2018 14:30), Max: 101.9 (20 Oct 2018 11:35)  HR: 117 (20 Oct 2018 14:30) (88 - 122)  BP: 127/74 (20 Oct 2018 14:30) (127/74 - 140/83)  BP(mean): --  RR: 18 (20 Oct 2018 14:30) (18 - 22)  SpO2: 92% (20 Oct 2018 14:30) (86% - 95%)    ABG - ( 20 Oct 2018 11:50 )  pH, Arterial: 7.50  pH, Blood: x     /  pCO2: 32    /  pO2: 83    / HCO3: 25    / Base Excess: 2.8   /  SaO2: 97        10-19 @ 07:01  -  10-20 @ 07:00  --------------------------------------------------------  IN: 300 mL / OUT: 0 mL / NET: 300 mL    LABS:                        11.2   16.6  )-----------( 268      ( 20 Oct 2018 12:01 )             34.5     10-20    148<H>  |  109<H>  |  21  ----------------------------<  188<H>  3.4<L>   |  22  |  0.71    Ca    9.0      20 Oct 2018 12:01  Phos  2.9     10-19  Mg     2.4     10-19    TPro  7.7  /  Alb  3.1<L>  /  TBili  0.4  /  DBili  x   /  AST  15  /  ALT  17  /  AlkPhos  120  10-20    CAPILLARY BLOOD GLUCOSE      POCT Blood Glucose.: 207 mg/dL (20 Oct 2018 11:42)    CULTURES: (if applicable)     (collected 10-17-18 @ 15:39)  Source: .Blood Blood  Preliminary Report (10-18-18 @ 16:01):    No growth to date.     (collected 10-17-18 @ 13:25)  Source: .Blood Blood  Preliminary Report (10-18-18 @ 14:01):    No growth to date.     (collected 10-16-18 @ 13:21)  Source: .Blood Blood-Peripheral  Preliminary Report (10-17-18 @ 14:01):    No growth to date.     (collected 10-16-18 @ 13:20)  Source: .Blood Blood-Peripheral  Preliminary Report (10-17-18 @ 14:01):    No growth to date.     (collected 10-15-18 @ 15:38)  Source: .Blood Blood-Peripheral  Gram Stain (10-16-18 @ 08:17):    Growth in aerobic bottle: Gram Positive Cocci in Clusters    Growth in anaerobic bottle: Gram Positive Cocci in Clusters  Final Report (10-19-18 @ 09:24):    Growth in aerobic and anaerobic bottles: Staphylococcus haemolyticus    Growth in anaerobic bottle: Staphylococcus epidermidis "Susceptibilities    not performed"    "Due to technical problems, Proteus sp. will Not be reported as part of    the BCID panel until further notice"    ***Blood Panel PCR results on this specimen are available    Organism: Blood Culture PCR (10-19-18 @ 09:24)      -  Methicillin resistant Staphylococcus aureus (MRSA): Detec      Method Type: PCR    Culture - Urine (collected 10-15-18 @ 16:54)  Source: .Urine Clean Catch (Midstream)  Final Report (10-16-18 @ 23:32):    <10,000 CFU/ml Normal Urogenital ck present    Physical Examination:    General: No acute distress.      HEENT: Pupils equal, reactive to light.  Symmetric.    PULM: Decreased BS at bases    CVS: S1, S2    ABD: Soft, nondistended, nontender, normoactive bowel sounds, no masses    EXT: No edema, nontender    SKIN: Warm and well perfused, no rashes noted.    NEURO: Alert, oriented, interactive, nonfocal    RADIOLOGY REVIEWED  CXR:    CT chest: multifocal PNA    TTE:

## 2018-10-20 NOTE — CONSULT NOTE ADULT - ASSESSMENT
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 BCx showing likely contaminant, repeat negative   RRT called for tachypnea, pt currently on BiPAP 12/8 FiO2 40%  Pt not in any acute respiratory distress at this time, comfortable on BiPAP  POCUS showing A-lines anterior, rare B lines posteriorly, Left sided consolidation seen   Recommendations  C/w Vancomycin, cefepime, add on azithromycin until urine legionella negative   Send urine legionella, F/u repeat BCx  C/w BiPAP at this time, hold all feeds at this time   Chest PT, keep angle of bed at 45 degrees  Would discontinue any standing IVF, lasix   Consider CTA to r/o PE, would order ROSIE robin     #LLOYDC   Pt currently full code   Would contact HCP or decision maker to asses goals of care and code status    Not a MICU candidate at this time, please reconsult as needed 82 y/o F w/ PMHx 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 BCx showing likely contaminant, repeat negative   RRT called for tachypnea, pt currently on BiPAP 12/8 FiO2 40%  Pt not in any acute respiratory distress at this time, comfortable on BiPAP  POCUS showing A-lines anterior, rare B lines posteriorly, Left sided consolidation seen   Recommendations  C/w Vancomycin, cefepime, add on azithromycin until urine legionella negative   Send urine legionella, F/u repeat BCx  C/w BiPAP at this time, hold all feeds at this time   Chest PT, keep angle of bed at 45 degrees  C/w IVF LR (pt hypernatremic) @ 50 cc/hr   Consider CTA to r/o PE, would order ROSIE robin     #GOC   Pt currently full code   Would contact HCP or decision maker to asses goals of care and code status    Not a MICU candidate at this time, please reconsult as needed

## 2018-10-20 NOTE — CONSULT NOTE ADULT - SUBJECTIVE AND OBJECTIVE BOX
CHIEF COMPLAINT: Patient is a 83y old  Female who presents with a chief complaint of sob (20 Oct 2018 13:23)      HPI:  International Travel? No(1)    .	   83F hx CVA, bed bound, PEG dependent, HTN, HLD, BIBEMS for resp distress x1day.  associated with cough,  rigors, subjective fevers. no n/v no abd pain no cp.  pt d oes have a h/o have hx of aspiration pna.	     Past Medical History:  CVA (cerebral vascular accident)    Dyslipidemia    Gout    HTN (hypertension)    MI (myocardial infarction)    Personal history of asbestosis    UTI (lower urinary tract infection). (15 Oct 2018 16:54)      PAST MEDICAL & SURGICAL HISTORY:  CVA (cerebral vascular accident)  ETOH abuse  UTI (lower urinary tract infection)  Dyslipidemia  Gout  Personal history of asbestosis  HTN (hypertension)  MI (myocardial infarction)  History of benign breast tumor  History of left shoulder fracture      FAMILY HISTORY:  No pertinent family history in first degree relatives      SOCIAL HISTORY:  Smoking: Denies smoking   EtOH Use:  Marital Status:  Occupation:  Recent Travel:  Country of Birth:  Advance Directives:    Allergies    Toradol (Urticaria (Mild to Mod); Rash (Mild to Mod))    Intolerances        HOME MEDICATIONS:    REVIEW OF SYSTEMS:  ROS limited, pt on BiPAP  Denies CP, abdominal pain, N/V/D    OBJECTIVE:  ICU Vital Signs Last 24 Hrs  T(C): 36.8 (20 Oct 2018 09:00), Max: 37.2 (19 Oct 2018 17:24)  T(F): 98.3 (20 Oct 2018 09:00), Max: 99 (19 Oct 2018 17:24)  HR: 114 (20 Oct 2018 11:14) (88 - 116)  BP: 135/70 (20 Oct 2018 09:00) (130/83 - 140/83)  BP(mean): --  ABP: --  ABP(mean): --  RR: 20 (20 Oct 2018 09:00) (18 - 20)  SpO2: 92% (20 Oct 2018 11:14) (86% - 95%)        10-19 @ 07:01  -  10-20 @ 07:00  --------------------------------------------------------  IN: 300 mL / OUT: 0 mL / NET: 300 mL    10-20 @ 07:01  -  10-20 @ 13:29  --------------------------------------------------------  IN: 0 mL / OUT: 0 mL / NET: 0 mL      CAPILLARY BLOOD GLUCOSE      POCT Blood Glucose.: 207 mg/dL (20 Oct 2018 11:42)      PHYSICAL EXAM:  GENERAL: NAD, obese   HEAD:  Atraumatic, Normocephalic  EYES: EOMI, PERRLA, conjunctiva and sclera clear  NECK: Supple, No JVD  CHEST/LUNG: On BiPAP, coarse breath sounds b/l, no crackles   HEART: Tachycardic, S1, S2; No murmurs, rubs, or gallops  ABDOMEN: Soft, Nontender, Nondistended; Bowel sounds present  EXTREMITIES:  2+ Peripheral Pulses, No clubbing, cyanosis, or edema  PSYCH: AAOx2  NEUROLOGY: non-focal  SKIN: No rashes or lesions    HOSPITAL MEDICATIONS:  MEDICATIONS  (STANDING):  acetylcysteine 10% Inhalation 3 milliLiter(s) Inhalation three times a day  ALBUTerol    90 MICROgram(s) HFA Inhaler 1 Puff(s) Inhalation every 4 hours  ALBUTerol   0.042% 1.25 milliGRAM(s) Nebulizer every 6 hours  allopurinol 300 milliGRAM(s) Oral daily  amLODIPine   Tablet 10 milliGRAM(s) Oral daily  Biotene Dry Mouth Oral Rinse 5 milliLiter(s) Swish and Spit four times a day  cefepime   IVPB      cefepime   IVPB 1000 milliGRAM(s) IV Intermittent every 12 hours  chlorhexidine 4% Liquid 1 Application(s) Topical <User Schedule>  citalopram 40 milliGRAM(s) Oral daily  furosemide   Injectable 20 milliGRAM(s) IV Push daily  heparin  Injectable 5000 Unit(s) SubCutaneous every 12 hours  ipratropium    for Nebulization 500 MICROGram(s) Nebulizer every 6 hours  levETIRAcetam  Solution 750 milliGRAM(s) Oral two times a day  levothyroxine 50 MICROGram(s) Oral daily  metoprolol tartrate 25 milliGRAM(s) Oral two times a day  pantoprazole   Suspension 40 milliGRAM(s) Oral daily  tiotropium 18 MICROgram(s) Capsule 1 Capsule(s) Inhalation daily  vancomycin  IVPB 750 milliGRAM(s) IV Intermittent every 12 hours    MEDICATIONS  (PRN):      LABS:                        11.2   16.6  )-----------( 268      ( 20 Oct 2018 12:01 )             34.5     10-20    148<H>  |  109<H>  |  21  ----------------------------<  188<H>  3.4<L>   |  22  |  0.71    Ca    9.0      20 Oct 2018 12:01  Phos  2.9     10-19  Mg     2.4     10-19    TPro  7.7  /  Alb  3.1<L>  /  TBili  0.4  /  DBili  x   /  AST  15  /  ALT  17  /  AlkPhos  120  10-20        Arterial Blood Gas:  10-20 @ 11:50  7.50/32/83/25/97/2.8  ABG lactate: --  Arterial Blood Gas:  10-19 @ 19:30  7.49/33/58/25/90/2.2  ABG lactate: --        MICROBIOLOGY:     RADIOLOGY:  [ ] Reviewed and interpreted by me    EKG:

## 2018-10-20 NOTE — CHART NOTE - NSCHARTNOTEFT_GEN_A_CORE
=========================================  CONTACT INFO  Yury Slater M.D., PGY-3  Pager: NS- [phone], [facility]- [phone]  MAR: [phone]  =========================================    RRT Resident Note-PGY3    RRT called for respiratory distress    On arrival patient on BiPAP 15/8 40% RR16 able to have full conversation w/ cont pulse ox 90%. .8 (rectal),113/70, 117,16. Per resident exam patient noted to be appropriate to conversation and have mild wheeze and rhonchi in bases b/l.  On chart review patient admitted for pneumonia on cefepime and vancomycin. Sepsis code called and patient given 500cc LR bolus, 1g IV acetaminophen and had stat labs including blood cx  completed w/ lactate 2.1 and abg w/ persistent respiratory alkalosis c/w relative high RR however not grossly changed from previous days. EKG completed and noted to be sinus tachycardia 117 w.o BOBBI. CXR reviewed w/ radiology and no appreciable change.  Medicine NP instructed and understood that the patient would need f/u lactate in 3hr.     In summary, 83F w/ hx of CVA w/ PEG, recent hospitalization/rehab, hx of MI(per chart), HTN, HLD on medicine service for pneumonia now w/ RRT for respiratory distress found to be in sepsis and therefore given fluid bolus, acetaminophen, and lactate checked 2.3 requiring repeat lactate in 3hr. Patient currently on vancomycin and cefepime.    Medicine NP to alert attending and to f/u lactate.    Santhosh Slater MD

## 2018-10-20 NOTE — PROGRESS NOTE ADULT - ASSESSMENT
82 year old female PMH recent hemorrhagic CVA, PEG,  HTN, MI,      HLD, gout   p/w SOB and  acute respiratory distress/ bipap  in er/ ,  elevated wbc and  hypokalemia.    probable aspiration pma/  gram negative  pna/on admission,  was on iv  zosyn  dementia  by  history   ct chest ,  multifocal pna   npo/ on peg feedings  on  synthroid, Kepra , celexa  on dvt ppx.   now  with  MRSA bacteremia/  staph  hemolyticus/ CNS/  s, epidermidis,  mlple  organisms isolated, may  be c/w  contaminant., discussed  with dr stone,  however  may still need  to rx MRSA, which cannot be dismissed as  a  contaminant,   defer to  ID   rpt  blood  c/s, no growth   echo, no vegetations  per  ID/ dr stone  , will decide  on duaration of  ab.    on cefepime/ vanco 82 year old female PMH  h/o hemorrhagic CVA, PEG,  HTN, MI,      HLD, gout   p/w SOB and  acute respiratory distress/ bipap  in er/ ,  elevated wbc and  hypokalemia.    probable aspiration pma/  gram negative  pna/on admission,  was on iv  zosyn  dementia  by  history   ct chest ,  multifocal pna   npo/ on peg feedings  on  synthroid, Kepra , celexa  on dvt ppx.   now  with  MRSA bacteremia/  staph  hemolyticus/ CNS/  s, epidermidis,  mlple  organisms isolated, may  be c/w  contaminant., discussed  with dr stone,  however  may still need  to rx MRSA, which cannot be dismissed as  a  contaminant,   defer to  ID   rpt  blood  c/s, no growth   echo, no vegetations  per  ID/ dr stone  , will decide  on duaration of  ab.    on cefepime/ vanco  tachypnea/ tachycardia.,  chronic  aspiration/   awaiting  ekg/ tele/ portable  cxr/  discussed   with  np and  rn 82 year old female PMH  h/o hemorrhagic CVA, PEG,  HTN, MI,      HLD, gout   p/w SOB and  acute respiratory distress/ bipap  in er/ ,  elevated wbc and  hypokalemia.    probable aspiration pma/  gram negative  pna/on admission,  was on iv  zosyn  dementia  by  history   ct chest ,  multifocal pna   npo/ on peg feedings  on  synthroid, Kepra , celexa  on dvt ppx.   now  with  MRSA bacteremia/  staph  hemolyticus/ CNS/  s, epidermidis,  mlple  organisms isolated, may  be c/w  contaminant., discussed  with dr stone,on 10/19,   however  may still need  to rx MRSA, which cannot be dismissed as  a  contaminant,   defer to  ID   rpt  blood  c/s, no growth   echo, no vegetations  per  ID/ dr stone  , will decide  on duaration of  ab.    on cefepime/ vanco  tachypnea/ tachycardia.,  chronic  aspiration/   awaiting  ekg/ tele/ portable  cxr/  iv lasix/ /  discussed   with  np and  rn 82 year old female PMH  h/o hemorrhagic CVA, PEG,  HTN, MI,      HLD, gout   p/w SOB and  acute respiratory distress/ bipap  in er/ ,  elevated wbc and  hypokalemia.    probable aspiration pma/  gram negative  pna/on admission,  was on iv  zosyn  dementia  by  history   ct chest ,  multifocal pna   npo/ on peg feedings  on  synthroid, Kepra , celexa  on dvt ppx.   now  with  MRSA bacteremia/  staph  hemolyticus/ CNS/  s, epidermidis,  mlple  organisms isolated, may  be c/w  contaminant., discussed  with dr stone,on 10/19,   however  may still need  to rx MRSA, which cannot be dismissed as  a  contaminant,   defer to  ID   rpt  blood  c/s, no growth   echo, no vegetations,  normal  ef  per  ID/ dr stone  , will decide  on duration of  ab.    on cefepime/ vanco    tachypnea/ tachycardia.,  chronic  aspiration/  pna/  gram negative    awaiting  ekg/ tele/ portable  cxr/  iv lasix/ /  discussed   with  np and  rn  pulm dr edson gibbs

## 2018-10-20 NOTE — CONSULT NOTE ADULT - SUBJECTIVE AND OBJECTIVE BOX
CHIEF COMPLAINT:Patient is a 83y old  Female who presents with a chief complaint of sob (20 Oct 2018 08:25)      HPI I was aked to see pt as per daughter she does not see any MD from outside last 2 years.   83F hx CVA, bed bound, PEG dependent, HTN, HLD, BIBEMS for resp distress x1day.  associated with cough,  rigors, subjective fevers. no n/v no abd pain no cp.  pt d oes have a h/o have hx of aspiration pna.  this am pt is tachpnic with increasing sob on bipap.	     Past Medical History:  CVA (cerebral vascular accident)    Dyslipidemia    Gout    HTN (hypertension)    MI (myocardial infarction)    Personal history of asbestosis    UTI (lower urinary tract infection). (15 Oct 2018 16:54)      PAST MEDICAL & SURGICAL HISTORY:  CVA (cerebral vascular accident)  ETOH abuse  UTI (lower urinary tract infection)  Dyslipidemia  Gout  Personal history of asbestosis  HTN (hypertension)  MI (myocardial infarction)  History of benign breast tumor  History of left shoulder fracture      MEDICATIONS  (STANDING):  ALBUTerol    90 MICROgram(s) HFA Inhaler 1 Puff(s) Inhalation every 4 hours  ALBUTerol   0.042% 1.25 milliGRAM(s) Nebulizer every 6 hours  allopurinol 300 milliGRAM(s) Oral daily  amLODIPine   Tablet 10 milliGRAM(s) Oral daily  Biotene Dry Mouth Oral Rinse 5 milliLiter(s) Swish and Spit four times a day  cefepime   IVPB      cefepime   IVPB 1000 milliGRAM(s) IV Intermittent every 12 hours  chlorhexidine 4% Liquid 1 Application(s) Topical <User Schedule>  citalopram 40 milliGRAM(s) Oral daily  furosemide   Injectable 20 milliGRAM(s) IV Push daily  heparin  Injectable 5000 Unit(s) SubCutaneous every 12 hours  ipratropium    for Nebulization 500 MICROGram(s) Nebulizer every 6 hours  levETIRAcetam  Solution 750 milliGRAM(s) Oral two times a day  levothyroxine 50 MICROGram(s) Oral daily  metoprolol tartrate 25 milliGRAM(s) Oral two times a day  pantoprazole   Suspension 40 milliGRAM(s) Oral daily  potassium chloride   Solution 40 milliEquivalent(s) Oral once  tiotropium 18 MICROgram(s) Capsule 1 Capsule(s) Inhalation daily  vancomycin  IVPB 750 milliGRAM(s) IV Intermittent every 12 hours    MEDICATIONS  (PRN):      FAMILY HISTORY:  No pertinent family history in first degree relatives      SOCIAL HISTORY:    [ ] Non-smoker  [ ] Smoker  [ ] Alcohol    Allergies    Toradol (Urticaria (Mild to Mod); Rash (Mild to Mod))    Intolerances    	    REVIEW OF SYSTEMS:  CONSTITUTIONAL: No fever, weight loss, or fatigue  EYES: No eye pain, visual disturbances, or discharge  ENT:  No difficulty hearing, tinnitus, vertigo; No sinus or throat pain  NECK: No pain or stiffness  RESPIRATORY: No cough, wheezing, chills or hemoptysis; + Shortness of Breath  CARDIOVASCULAR: No chest pain, palpitations, passing out, dizziness, or leg swelling  GASTROINTESTINAL: No abdominal or epigastric pain. No nausea, vomiting, or hematemesis; No diarrhea or constipation. No melena or hematochezia.  GENITOURINARY: No dysuria, frequency, hematuria, or incontinence  NEUROLOGICAL: No headaches, memory loss, loss of strength, numbness, or tremors  SKIN: No itching, burning, rashes, or lesions   LYMPH Nodes: No enlarged glands  ENDOCRINE: No heat or cold intolerance; No hair loss  MUSCULOSKELETAL: No joint pain or swelling; No muscle, back, or extremity pain  PSYCHIATRIC: No depression, anxiety, mood swings, or difficulty sleeping  HEME/LYMPH: No easy bruising, or bleeding gums  ALLERGY AND IMMUNOLOGIC: No hives or eczema	    [ ] All others negative	  [ ] Unable to obtain    PHYSICAL EXAM:  T(C): 36.8 (10-20-18 @ 09:00), Max: 37.2 (10-19-18 @ 17:24)  HR: 114 (10-20-18 @ 11:14) (88 - 116)  BP: 135/70 (10-20-18 @ 09:00) (130/83 - 147/79)  RR: 20 (10-20-18 @ 09:00) (18 - 20)  SpO2: 92% (10-20-18 @ 11:14) (86% - 95%)  Wt(kg): --  I&O's Summary    19 Oct 2018 07:01  -  20 Oct 2018 07:00  --------------------------------------------------------  IN: 300 mL / OUT: 0 mL / NET: 300 mL    20 Oct 2018 07:01  -  20 Oct 2018 11:28  --------------------------------------------------------  IN: 0 mL / OUT: 0 mL / NET: 0 mL        Appearance: Normal	  HEENT:   Normal oral mucosa, PERRL, EOMI	  Lymphatic: No lymphadenopathy  Cardiovascular: Normal S1 S2, No JVD, + murmurs, No edema  Respiratory: rhonchi and diffuse bronchial sound  Psychiatry: A & O x 3, Mood & affect appropriate  Gastrointestinal:  Soft, Non-tender, + BS	  Skin: No rashes, No ecchymoses, No cyanosis	  Neurologic: Non-focal  Extremities: Normal range of motion, No clubbing, cyanosis or edema  Vascular: Peripheral pulses palpable 2+ bilaterally    TELEMETRY: 	    ECG:  	  RADIOLOGY:  OTHER: 	  	  LABS:	 	    CARDIAC MARKERS:                              10.6   15.71 )-----------( 272      ( 20 Oct 2018 07:05 )             33.5     10-20    146<H>  |  108  |  20  ----------------------------<  239<H>  3.1<L>   |  22  |  0.69    Ca    9.0      20 Oct 2018 05:45  Phos  2.9     10-19  Mg     2.4     10-19      proBNP:   Lipid Profile:   HgA1c:   TSH:       PREVIOUS DIAGNOSTIC TESTING:    < from: Transthoracic Echocardiogram (10.17.18 @ 17:21) >  1. Calcified trileaflet aortic valve with decreased  opening. Peak transaortic valve gradient equals 22 mm Hg,  mean transaortic valve gradient equals 11 mm Hg, estimated  aortic valve area equals 1.7 sqcm (by continuity equation),  aortic valve velocity time integral equals 35 cm,  consistent with mild aortic stenosis.  2. Increased relative wall thickness with normal left  ventricular mass index, consistent with concentric left  ventricular remodeling.  3. Endocardium not well visualized; grossly normal left  ventricular systolic function.  4. The right ventricle is not well visualized; grossly  normal right ventricular systolic function.  5. Estimated right ventricular systolic pressure equals 26  mm Hg, assuming right atrial pressure equals 8 mm Hg,  consistent with normal pulmonary pressures.  6. No mobile echodensities or vegetations are seen on the  aortic or mitral valve. The tricuspid and pulmonic valves  are not well visualized. Can not exclude endocarditis.  Consider MIKALA if clinically indicated.    < from: CT Chest No Cont (10.16.18 @ 22:21) >  Multifocal pneumonia. Follow-up to resolution recommended.    Indeterminateright breast nodule is new from the prior study,   ultrasound/mammography needed for complete evaluation.    < end of copied text >

## 2018-10-20 NOTE — PROVIDER CONTACT NOTE (OTHER) - REASON
RRT was called for low O2 88% on Bipap RRT was called for low O2 88% on Bipap, increased Heart rate 116-122 and temp 101.9

## 2018-10-20 NOTE — PROGRESS NOTE ADULT - SUBJECTIVE AND OBJECTIVE BOX
was  hypoxic/ events noted/  on bipap   pulm eval    REVIEW OF SYSTEMS:  GEN: no fever,    no chills  RESP: no SOB,   no cough  CVS: no chest pain,   no palpitations  GI: no abdominal pain,   no nausea,   no vomiting,   no constipation,   no diarrhea  : no dysuria,   no frequency  NEURO: no headache,   no dizziness  PSYCH: no depression,   not anxious  Derm : no rash    MEDICATIONS  (STANDING):  ALBUTerol    90 MICROgram(s) HFA Inhaler 1 Puff(s) Inhalation every 4 hours  ALBUTerol   0.042% 1.25 milliGRAM(s) Nebulizer every 6 hours  allopurinol 300 milliGRAM(s) Oral daily  amLODIPine   Tablet 10 milliGRAM(s) Oral daily  Biotene Dry Mouth Oral Rinse 5 milliLiter(s) Swish and Spit four times a day  cefepime   IVPB      cefepime   IVPB 1000 milliGRAM(s) IV Intermittent every 12 hours  chlorhexidine 4% Liquid 1 Application(s) Topical <User Schedule>  citalopram 40 milliGRAM(s) Oral daily  heparin  Injectable 5000 Unit(s) SubCutaneous every 12 hours  ipratropium    for Nebulization 500 MICROGram(s) Nebulizer every 6 hours  levETIRAcetam  Solution 750 milliGRAM(s) Oral two times a day  levothyroxine 50 MICROGram(s) Oral daily  metoprolol tartrate 25 milliGRAM(s) Oral two times a day  pantoprazole   Suspension 40 milliGRAM(s) Oral daily  tiotropium 18 MICROgram(s) Capsule 1 Capsule(s) Inhalation daily  vancomycin  IVPB 750 milliGRAM(s) IV Intermittent every 12 hours    MEDICATIONS  (PRN):      Vital Signs Last 24 Hrs  T(C): 37.1 (20 Oct 2018 05:17), Max: 37.2 (19 Oct 2018 17:24)  T(F): 98.7 (20 Oct 2018 05:17), Max: 99 (19 Oct 2018 17:24)  HR: 90 (20 Oct 2018 06:54) (88 - 116)  BP: 140/78 (20 Oct 2018 05:17) (130/83 - 154/84)  BP(mean): --  RR: 18 (20 Oct 2018 05:17) (18 - 20)  SpO2: 92% (20 Oct 2018 06:54) (86% - 97%)  CAPILLARY BLOOD GLUCOSE        I&O's Summary    19 Oct 2018 07:01  -  20 Oct 2018 07:00  --------------------------------------------------------  IN: 300 mL / OUT: 0 mL / NET: 300 mL        PHYSICAL EXAM:  HEAD:  Atraumatic, Normocephalic  NECK: Supple, No   JVD  CHEST/LUNG:   no     rales,     no,    rhonchi  HEART: Regular rate and rhythm;         murmur  ABDOMEN: Soft, Nontender, ;   EXTREMITIES:        edema  NEUROLOGY:  alert/     LABS:                        11.3   14.6  )-----------( 248      ( 19 Oct 2018 07:16 )             38.0     10-20    146<H>  |  108  |  20  ----------------------------<  239<H>  3.1<L>   |  22  |  0.69    Ca    9.0      20 Oct 2018 05:45  Phos  2.9     10-19  Mg     2.4     10-19                ABG - ( 19 Oct 2018 19:30 )  pH, Arterial: 7.49  pH, Blood: x     /  pCO2: 33    /  pO2: 58    / HCO3: 25    / Base Excess: 2.2   /  SaO2: 90                  Hemoglobin A1C, Whole Blood: 5.6 % (10-01 @ 07:40)    Thyroid Stimulating Hormone, Serum: 1.30 uIU/mL (10-01 @ 07:40)          Consultant(s) Notes Reviewed:      Care Discussed with Consultants/Other Providers: was  hypoxic/ events noted/  on bipap  ot  with  tachypnea/ tachycardia  resp  therapist  and  rn at bedside   puledson hernandes    REVIEW OF SYSTEMS:  GEN: no fever,    no chills  RESP: SOB,   no cough  CVS: no chest pain,   no palpitations  GI: no abdominal pain,   no nausea,   no vomiting,   no constipation,   no diarrhea  : no dysuria,   no frequency  NEURO: no headache,   no dizziness  PSYCH: no depression,   not anxious  Derm : no rash    MEDICATIONS  (STANDING):  ALBUTerol    90 MICROgram(s) HFA Inhaler 1 Puff(s) Inhalation every 4 hours  ALBUTerol   0.042% 1.25 milliGRAM(s) Nebulizer every 6 hours  allopurinol 300 milliGRAM(s) Oral daily  amLODIPine   Tablet 10 milliGRAM(s) Oral daily  Biotene Dry Mouth Oral Rinse 5 milliLiter(s) Swish and Spit four times a day  cefepime   IVPB      cefepime   IVPB 1000 milliGRAM(s) IV Intermittent every 12 hours  chlorhexidine 4% Liquid 1 Application(s) Topical <User Schedule>  citalopram 40 milliGRAM(s) Oral daily  heparin  Injectable 5000 Unit(s) SubCutaneous every 12 hours  ipratropium    for Nebulization 500 MICROGram(s) Nebulizer every 6 hours  levETIRAcetam  Solution 750 milliGRAM(s) Oral two times a day  levothyroxine 50 MICROGram(s) Oral daily  metoprolol tartrate 25 milliGRAM(s) Oral two times a day  pantoprazole   Suspension 40 milliGRAM(s) Oral daily  tiotropium 18 MICROgram(s) Capsule 1 Capsule(s) Inhalation daily  vancomycin  IVPB 750 milliGRAM(s) IV Intermittent every 12 hours    MEDICATIONS  (PRN):      Vital Signs Last 24 Hrs  T(C): 37.1 (20 Oct 2018 05:17), Max: 37.2 (19 Oct 2018 17:24)  T(F): 98.7 (20 Oct 2018 05:17), Max: 99 (19 Oct 2018 17:24)  HR: 90 (20 Oct 2018 06:54) (88 - 116)  BP: 140/78 (20 Oct 2018 05:17) (130/83 - 154/84)  BP(mean): --  RR: 18 (20 Oct 2018 05:17) (18 - 20)  SpO2: 92% (20 Oct 2018 06:54) (86% - 97%)  CAPILLARY BLOOD GLUCOSE        I&O's Summary    19 Oct 2018 07:01  -  20 Oct 2018 07:00  --------------------------------------------------------  IN: 300 mL / OUT: 0 mL / NET: 300 mL        PHYSICAL EXAM:  HEAD:  Atraumatic, Normocephalic  NECK: Supple, No   JVD  CHEST/LUNG:   b/l  rhonchi,  coarse  breath sounds  HEART: Regular rate and rhythm;         murmur  ABDOMEN: Soft, Nontender, ;   EXTREMITIES:     mild   edema  NEUROLOGY:  alert/     LABS:                        11.3   14.6  )-----------( 248      ( 19 Oct 2018 07:16 )             38.0     10-20    146<H>  |  108  |  20  ----------------------------<  239<H>  3.1<L>   |  22  |  0.69    Ca    9.0      20 Oct 2018 05:45  Phos  2.9     10-19  Mg     2.4     10-19                ABG - ( 19 Oct 2018 19:30 )  pH, Arterial: 7.49  pH, Blood: x     /  pCO2: 33    /  pO2: 58    / HCO3: 25    / Base Excess: 2.2   /  SaO2: 90                  Hemoglobin A1C, Whole Blood: 5.6 % (10-01 @ 07:40)    Thyroid Stimulating Hormone, Serum: 1.30 uIU/mL (10-01 @ 07:40)          Consultant(s) Notes Reviewed:      Care Discussed with Consultants/Other Providers: was  hypoxic/ events noted/  on bipap  ot  with  tachypnea/ tachycardia  resp  therapist  and  rn at bedside   puledson hernandes    REVIEW OF SYSTEMS:  GEN: no fever,    no chills  RESP: SOB,   no cough  CVS: no chest pain,   no palpitations  GI: no abdominal pain,   no nausea,   no vomiting,   no constipation,   no diarrhea  : no dysuria,   no frequency  NEURO: no headache,   no dizziness  PSYCH: no depression,   not anxious  Derm : no rash    MEDICATIONS  (STANDING):  ALBUTerol    90 MICROgram(s) HFA Inhaler 1 Puff(s) Inhalation every 4 hours  ALBUTerol   0.042% 1.25 milliGRAM(s) Nebulizer every 6 hours  allopurinol 300 milliGRAM(s) Oral daily  amLODIPine   Tablet 10 milliGRAM(s) Oral daily  Biotene Dry Mouth Oral Rinse 5 milliLiter(s) Swish and Spit four times a day  cefepime   IVPB      cefepime   IVPB 1000 milliGRAM(s) IV Intermittent every 12 hours  chlorhexidine 4% Liquid 1 Application(s) Topical <User Schedule>  citalopram 40 milliGRAM(s) Oral daily  heparin  Injectable 5000 Unit(s) SubCutaneous every 12 hours  ipratropium    for Nebulization 500 MICROGram(s) Nebulizer every 6 hours  levETIRAcetam  Solution 750 milliGRAM(s) Oral two times a day  levothyroxine 50 MICROGram(s) Oral daily  metoprolol tartrate 25 milliGRAM(s) Oral two times a day  pantoprazole   Suspension 40 milliGRAM(s) Oral daily  tiotropium 18 MICROgram(s) Capsule 1 Capsule(s) Inhalation daily  vancomycin  IVPB 750 milliGRAM(s) IV Intermittent every 12 hours    MEDICATIONS  (PRN):      Vital Signs Last 24 Hrs  T(C): 37.1 (20 Oct 2018 05:17), Max: 37.2 (19 Oct 2018 17:24)  T(F): 98.7 (20 Oct 2018 05:17), Max: 99 (19 Oct 2018 17:24)  HR: 90 (20 Oct 2018 06:54) (88 - 116)  BP: 140/78 (20 Oct 2018 05:17) (130/83 - 154/84)  BP(mean): --  RR: 18 (20 Oct 2018 05:17) (18 - 20)  SpO2: 92% (20 Oct 2018 06:54) (86% - 97%)  CAPILLARY BLOOD GLUCOSE        I&O's Summary    19 Oct 2018 07:01  -  20 Oct 2018 07:00  --------------------------------------------------------  IN: 300 mL / OUT: 0 mL / NET: 300 mL    has  sob/ tachy    PHYSICAL EXAM:  HEAD:  Atraumatic, Normocephalic  NECK: Supple, No   JVD  CHEST/LUNG:   b/l  rhonchi,  coarse  breath sounds  HEART: Regular rate and rhythm;         murmur  ABDOMEN: Soft, Nontender, ;   EXTREMITIES:       edema  NEUROLOGY:  alert/     LABS:                        11.3   14.6  )-----------( 248      ( 19 Oct 2018 07:16 )             38.0     10-20    146<H>  |  108  |  20  ----------------------------<  239<H>  3.1<L>   |  22  |  0.69    Ca    9.0      20 Oct 2018 05:45  Phos  2.9     10-19  Mg     2.4     10-19                ABG - ( 19 Oct 2018 19:30 )  pH, Arterial: 7.49  pH, Blood: x     /  pCO2: 33    /  pO2: 58    / HCO3: 25    / Base Excess: 2.2   /  SaO2: 90                  Hemoglobin A1C, Whole Blood: 5.6 % (10-01 @ 07:40)    Thyroid Stimulating Hormone, Serum: 1.30 uIU/mL (10-01 @ 07:40)          Consultant(s) Notes Reviewed:      Care Discussed with Consultants/Other Providers:

## 2018-10-20 NOTE — PROGRESS NOTE ADULT - SUBJECTIVE AND OBJECTIVE BOX
CC: f/u for pneumonia    Patient lethargic, tachycardic, on BiPAP     REVIEW OF SYSTEMS:  All other review of systems negative (Comprehensive ROS)- limited    Antimicrobials Day # 2 Vanco + Cefepime  Medications Reviewed    Vital Signs Last 24 Hrs  T(F): 98.3 (20 Oct 2018 09:00), Max: 99 (19 Oct 2018 17:24)  HR: 114 (20 Oct 2018 11:14) (88 - 116)  BP: 135/70 (20 Oct 2018 09:00) (130/83 - 140/83)  BP(mean): --  RR: 20 (20 Oct 2018 09:00) (18 - 20)  SpO2: 92% (20 Oct 2018 11:14) (86% - 95%)    PHYSICAL EXAM:  General: lethargic, resp's labored, on BiPAP  Eyes:  anicteric, no conjunctival injection, no discharge  Oropharynx: no lesions or injection 	  Neck: supple, without adenopathy  Lungs: rhonchi anteriorly   Heart: regular rate and rhythm; no murmur, rubs or gallops  Abdomen: soft, nondistended, nontender, without mass or organomegaly  Skin: no lesions  Extremities: L LE contracture,   Neurologic: L weakness    LAB RESULTS:                        11.2   16.6  )-----------( 268      ( 20 Oct 2018 12:01 )             34.5   10-20    148<H>  |  109<H>  |  21  ----------------------------<  188<H>  3.4<L>   |  22  |  0.71    Ca    9.0      20 Oct 2018 12:01  Phos  2.9     10-19  Mg     2.4     10-19    TPro  7.7  /  Alb  3.1<L>  /  TBili  0.4  /  DBili  x   /  AST  15  /  ALT  17  /  AlkPhos  120  10-20      MICROBIOLOGY:  RECENT CULTURES:  Culture - Blood in AM (10.17.18 @ 15:39)    Specimen Source: .Blood Blood    Culture Results:   No growth to date.    10-16 @ 13:21 .Blood Blood-Peripheral     No growth to date.    10-16 @ 13:20 .Blood Blood-Peripheral     No growth to date.    10-15 @ 16:54 .Urine Clean Catch (Midstream)     <10,000 CFU/ml Normal Urogenital ck present    Culture - Blood (10.15.18 @ 15:38)    Gram Stain:   Growth in aerobic bottle: Gram Positive Cocci in Clusters  Growth in anaerobic bottle: Gram Positive Cocci in Clusters    Specimen Source: .Blood Blood-Peripheral    Culture Results:   Growth in aerobic and anaerobic bottles: Staphylococcus haemolyticus  See previous culture [id number]    Culture - Blood (10.15.18 @ 15:38)    -  Cefazolin: S <=4    -  Erythromycin: R >4    -  Ampicillin/Sulbactam: S <=8/4    -  Clindamycin: S <=0.25    -  RIF- Rifampin: S <=1 Should not be used as monotherapy    -  Tetra/Doxy: S <=1    -  Gentamicin: S <=1 Should not be used as monotherapy    -  Oxacillin: S <=0.25    -  Trimethoprim/Sulfamethoxazole: S <=0.5/9.5    -  Vancomycin: S 2    Gram Stain:   Growth in aerobic bottle: Gram Positive Cocci in Clusters  Growth in anaerobic bottle: Gram Positive Cocci in Clusters    -  Methicillin resistant Staphylococcus aureus (MRSA): Detec    Specimen Source: .Blood Blood-Peripheral    Organism: Coag Negative Staphylococcus    Organism: Blood Culture PCR    Culture Results:   Growth in aerobic and anaerobic bottles: Staphylococcus haemolyticus  Growth in anaerobic bottle: Staphylococcus epidermidis "Susceptibilities  not performed"        RADIOLOGY REVIEWED:  Xray Chest 1 View-PORTABLE IMMEDIATE (10.20.18 @ 11:52) >  There are bilateral lower lobe opacities consistent with atelectasis -   however pneumonia cannot be excluded in the proper clinical context.  No   significant interval change from prior examination.    CT Chest No Cont (10.16.18 @ 22:21) >  Multifocal pneumonia. Follow-up to resolution recommended.    Indeterminateright breast nodule is new from the prior study,   ultrasound/mammography needed for complete evaluation.

## 2018-10-20 NOTE — PROGRESS NOTE ADULT - ASSESSMENT
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 presents with fever, sob, and what was initially felt to be MRSA bacteremia- PCR +, but in d/w Micro, final report, admission Bld Cxs- no MRSA on plates, growth of 2 strains CoNS- S haemolyticus and S epidermitis; this best explained as gross contamination- all repeat Bld Cxs negative  False + MRSA PCR in setting of overgrowth of CoNS  Multifocal pneumonia primary focus  Afebrile, but tenuous resp status- lethargic, hypoxic, continuous BiPAP  CXR no significant change  Poor resp reserve     Recommendations:  Continue Tx directed at [facility]- Day 2 Vanco + Cefepime  Follow temps and CBC/diff   O2 and BiPAP support as outlined  d/w MICU team at bedside  Prognosis guarded

## 2018-10-20 NOTE — CONSULT NOTE ADULT - ASSESSMENT
pt sob doubt is sec to chf  pt needs micu eval  ecg stat to r/o a.fib  abx  repeat chest xray  dvt prophylaxis

## 2018-10-20 NOTE — CHART NOTE - NSCHARTNOTEFT_GEN_A_CORE
RRT called by RN for tachypnea, sepsis. RRT team at the bedside. ABG done. Lactate 2.1 Continue with IVF. VBG at 3:00 PM  MICU consult called and will be transfer to a telemetry floor. D/w attending Dr Segundo. Will follow ICU recommendation. Continue BIPAP 12/8, FIO2 40%.  Vital Signs Last 24 Hrs  T(C): 36.9 (20 Oct 2018 13:33), Max: 38.8 (20 Oct 2018 11:35)  T(F): 98.5 (20 Oct 2018 13:33), Max: 101.9 (20 Oct 2018 11:35)  HR: 115 (20 Oct 2018 13:33) (88 - 122)  BP: 135/88 (20 Oct 2018 13:33) (130/83 - 140/83)  BP(mean): --  RR: 20 (20 Oct 2018 09:00) (18 - 20)  SpO2: 92% (20 Oct 2018 13:33) (86% - 95%)                          11.2   16.6  )-----------( 268      ( 20 Oct 2018 12:01 )             34.5       10-20    148<H>  |  109<H>  |  21  ----------------------------<  188<H>  3.4<L>   |  22  |  0.71    Ca    9.0      20 Oct 2018 12:01  Phos  2.9     10-19  Mg     2.4     10-19    TPro  7.7  /  Alb  3.1<L>  /  TBili  0.4  /  DBili  x   /  AST  15  /  ALT  17  /  AlkPhos  120  10-20

## 2018-10-21 LAB
ANION GAP SERPL CALC-SCNC: 17 MMOL/L — SIGNIFICANT CHANGE UP (ref 5–17)
BUN SERPL-MCNC: 18 MG/DL — SIGNIFICANT CHANGE UP (ref 7–23)
CALCIUM SERPL-MCNC: 9.5 MG/DL — SIGNIFICANT CHANGE UP (ref 8.4–10.5)
CHLORIDE SERPL-SCNC: 105 MMOL/L — SIGNIFICANT CHANGE UP (ref 96–108)
CO2 SERPL-SCNC: 24 MMOL/L — SIGNIFICANT CHANGE UP (ref 22–31)
CREAT SERPL-MCNC: 0.79 MG/DL — SIGNIFICANT CHANGE UP (ref 0.5–1.3)
CULTURE RESULTS: SIGNIFICANT CHANGE UP
CULTURE RESULTS: SIGNIFICANT CHANGE UP
GLUCOSE SERPL-MCNC: 180 MG/DL — HIGH (ref 70–99)
HCT VFR BLD CALC: 36.5 % — SIGNIFICANT CHANGE UP (ref 34.5–45)
HGB BLD-MCNC: 11.4 G/DL — LOW (ref 11.5–15.5)
MCHC RBC-ENTMCNC: 31.2 GM/DL — LOW (ref 32–36)
MCHC RBC-ENTMCNC: 31.4 PG — SIGNIFICANT CHANGE UP (ref 27–34)
MCV RBC AUTO: 100.6 FL — HIGH (ref 80–100)
PLATELET # BLD AUTO: 298 K/UL — SIGNIFICANT CHANGE UP (ref 150–400)
POTASSIUM SERPL-MCNC: 3.2 MMOL/L — LOW (ref 3.5–5.3)
POTASSIUM SERPL-SCNC: 3.2 MMOL/L — LOW (ref 3.5–5.3)
RBC # BLD: 3.63 M/UL — LOW (ref 3.8–5.2)
RBC # FLD: 15.4 % — HIGH (ref 10.3–14.5)
SODIUM SERPL-SCNC: 146 MMOL/L — HIGH (ref 135–145)
SPECIMEN SOURCE: SIGNIFICANT CHANGE UP
SPECIMEN SOURCE: SIGNIFICANT CHANGE UP
VANCOMYCIN TROUGH SERPL-MCNC: 22.5 UG/ML — HIGH (ref 10–20)
WBC # BLD: 19.28 K/UL — HIGH (ref 3.8–10.5)
WBC # FLD AUTO: 19.28 K/UL — HIGH (ref 3.8–10.5)

## 2018-10-21 PROCEDURE — 99233 SBSQ HOSP IP/OBS HIGH 50: CPT

## 2018-10-21 PROCEDURE — 71045 X-RAY EXAM CHEST 1 VIEW: CPT | Mod: 26

## 2018-10-21 RX ORDER — IPRATROPIUM/ALBUTEROL SULFATE 18-103MCG
3 AEROSOL WITH ADAPTER (GRAM) INHALATION ONCE
Qty: 0 | Refills: 0 | Status: COMPLETED | OUTPATIENT
Start: 2018-10-21 | End: 2018-10-21

## 2018-10-21 RX ORDER — POTASSIUM CHLORIDE 20 MEQ
10 PACKET (EA) ORAL ONCE
Qty: 0 | Refills: 0 | Status: DISCONTINUED | OUTPATIENT
Start: 2018-10-21 | End: 2018-10-21

## 2018-10-21 RX ORDER — POTASSIUM CHLORIDE 20 MEQ
40 PACKET (EA) ORAL ONCE
Qty: 0 | Refills: 0 | Status: COMPLETED | OUTPATIENT
Start: 2018-10-21 | End: 2018-10-21

## 2018-10-21 RX ORDER — FUROSEMIDE 40 MG
20 TABLET ORAL ONCE
Qty: 0 | Refills: 0 | Status: COMPLETED | OUTPATIENT
Start: 2018-10-21 | End: 2018-10-21

## 2018-10-21 RX ADMIN — Medication 3 MILLILITER(S): at 06:43

## 2018-10-21 RX ADMIN — Medication 125 MILLIGRAM(S): at 07:50

## 2018-10-21 RX ADMIN — LEVETIRACETAM 750 MILLIGRAM(S): 250 TABLET, FILM COATED ORAL at 17:18

## 2018-10-21 RX ADMIN — Medication 250 MILLIGRAM(S): at 13:01

## 2018-10-21 RX ADMIN — AMLODIPINE BESYLATE 10 MILLIGRAM(S): 2.5 TABLET ORAL at 05:14

## 2018-10-21 RX ADMIN — Medication 500 MICROGRAM(S): at 13:01

## 2018-10-21 RX ADMIN — CEFEPIME 100 MILLIGRAM(S): 1 INJECTION, POWDER, FOR SOLUTION INTRAMUSCULAR; INTRAVENOUS at 17:17

## 2018-10-21 RX ADMIN — LEVETIRACETAM 750 MILLIGRAM(S): 250 TABLET, FILM COATED ORAL at 05:16

## 2018-10-21 RX ADMIN — Medication 25 MILLIGRAM(S): at 17:18

## 2018-10-21 RX ADMIN — Medication 500 MICROGRAM(S): at 23:35

## 2018-10-21 RX ADMIN — Medication 3 MILLILITER(S): at 05:14

## 2018-10-21 RX ADMIN — AZITHROMYCIN 250 MILLIGRAM(S): 500 TABLET, FILM COATED ORAL at 18:23

## 2018-10-21 RX ADMIN — Medication 3 MILLILITER(S): at 23:34

## 2018-10-21 RX ADMIN — Medication 50 MICROGRAM(S): at 05:14

## 2018-10-21 RX ADMIN — HEPARIN SODIUM 5000 UNIT(S): 5000 INJECTION INTRAVENOUS; SUBCUTANEOUS at 05:15

## 2018-10-21 RX ADMIN — ALBUTEROL 1.25 MILLIGRAM(S): 90 AEROSOL, METERED ORAL at 13:01

## 2018-10-21 RX ADMIN — CITALOPRAM 40 MILLIGRAM(S): 10 TABLET, FILM COATED ORAL at 13:01

## 2018-10-21 RX ADMIN — Medication 25 MILLIGRAM(S): at 05:14

## 2018-10-21 RX ADMIN — Medication 20 MILLIGRAM(S): at 07:07

## 2018-10-21 RX ADMIN — ALBUTEROL 1.25 MILLIGRAM(S): 90 AEROSOL, METERED ORAL at 17:18

## 2018-10-21 RX ADMIN — Medication 20 MILLIGRAM(S): at 05:15

## 2018-10-21 RX ADMIN — Medication 500 MICROGRAM(S): at 05:14

## 2018-10-21 RX ADMIN — ALBUTEROL 1.25 MILLIGRAM(S): 90 AEROSOL, METERED ORAL at 23:34

## 2018-10-21 RX ADMIN — Medication 500 MICROGRAM(S): at 17:18

## 2018-10-21 RX ADMIN — PANTOPRAZOLE SODIUM 40 MILLIGRAM(S): 20 TABLET, DELAYED RELEASE ORAL at 13:01

## 2018-10-21 RX ADMIN — Medication 300 MILLIGRAM(S): at 13:01

## 2018-10-21 RX ADMIN — ALBUTEROL 1.25 MILLIGRAM(S): 90 AEROSOL, METERED ORAL at 05:14

## 2018-10-21 RX ADMIN — CEFEPIME 100 MILLIGRAM(S): 1 INJECTION, POWDER, FOR SOLUTION INTRAMUSCULAR; INTRAVENOUS at 05:16

## 2018-10-21 RX ADMIN — Medication 40 MILLIEQUIVALENT(S): at 13:02

## 2018-10-21 RX ADMIN — CHLORHEXIDINE GLUCONATE 1 APPLICATION(S): 213 SOLUTION TOPICAL at 09:05

## 2018-10-21 RX ADMIN — Medication 3 MILLILITER(S): at 13:02

## 2018-10-21 RX ADMIN — HEPARIN SODIUM 5000 UNIT(S): 5000 INJECTION INTRAVENOUS; SUBCUTANEOUS at 17:18

## 2018-10-21 RX ADMIN — Medication 3 MILLILITER(S): at 02:25

## 2018-10-21 NOTE — PROGRESS NOTE ADULT - SUBJECTIVE AND OBJECTIVE BOX
Follow-up Pulm Progress Note    Appears better on Bilevel. Awake, interactive.     Medications:  MEDICATIONS  (STANDING):  acetylcysteine 10% Inhalation 3 milliLiter(s) Inhalation three times a day  ALBUTerol    90 MICROgram(s) HFA Inhaler 1 Puff(s) Inhalation every 4 hours  ALBUTerol   0.042% 1.25 milliGRAM(s) Nebulizer every 6 hours  allopurinol 300 milliGRAM(s) Oral daily  amLODIPine   Tablet 10 milliGRAM(s) Oral daily  azithromycin  IVPB 500 milliGRAM(s) IV Intermittent every 24 hours  azithromycin  IVPB      Biotene Dry Mouth Oral Rinse 5 milliLiter(s) Swish and Spit four times a day  cefepime   IVPB      cefepime   IVPB 1000 milliGRAM(s) IV Intermittent every 12 hours  chlorhexidine 4% Liquid 1 Application(s) Topical <User Schedule>  citalopram 40 milliGRAM(s) Oral daily  heparin  Injectable 5000 Unit(s) SubCutaneous every 12 hours  ipratropium    for Nebulization 500 MICROGram(s) Nebulizer every 6 hours  levETIRAcetam  Solution 750 milliGRAM(s) Oral two times a day  levothyroxine 50 MICROGram(s) Oral daily  metoprolol tartrate 25 milliGRAM(s) Oral two times a day  pantoprazole   Suspension 40 milliGRAM(s) Oral daily  tiotropium 18 MICROgram(s) Capsule 1 Capsule(s) Inhalation daily  vancomycin  IVPB 750 milliGRAM(s) IV Intermittent every 12 hours    MEDICATIONS  (PRN):    Vital Signs Last 24 Hrs  T(C): 36.5 (21 Oct 2018 12:23), Max: 37.4 (20 Oct 2018 21:15)  T(F): 97.7 (21 Oct 2018 12:23), Max: 99.3 (20 Oct 2018 21:15)  HR: 97 (21 Oct 2018 13:02) (96 - 113)  BP: 148/75 (21 Oct 2018 12:23) (124/78 - 148/75)  BP(mean): --  RR: 18 (21 Oct 2018 12:23) (18 - 18)  SpO2: 95% (21 Oct 2018 13:02) (89% - 95%)    ABG - ( 20 Oct 2018 11:50 )  pH, Arterial: 7.50  pH, Blood: x     /  pCO2: 32    /  pO2: 83    / HCO3: 25    / Base Excess: 2.8   /  SaO2: 97        VBG pH 7.44 10-20 @ 16:28    VBG pCO2 40 10-20 @ 16:28    VBG O2 sat 50 10-20 @ 16:28    VBG lactate 2.0 10-20 @ 16:28      10-20 @ 07:01  -  10-21 @ 07:00  --------------------------------------------------------  IN: 350 mL / OUT: 650 mL / NET: -300 mL    LABS:                        11.4   19.28 )-----------( 298      ( 21 Oct 2018 08:18 )             36.5     10-21    146<H>  |  105  |  18  ----------------------------<  180<H>  3.2<L>   |  24  |  0.79    Ca    9.5      21 Oct 2018 07:08    TPro  7.7  /  Alb  3.1<L>  /  TBili  0.4  /  DBili  x   /  AST  15  /  ALT  17  /  AlkPhos  120  10-20    CAPILLARY BLOOD GLUCOSE      POCT Blood Glucose.: 207 mg/dL (20 Oct 2018 11:42)    CULTURES: (if applicable)    Culture - Blood (collected 10-20-18 @ 14:48)  Source: .Blood Blood-Venous  Preliminary Report (10-21-18 @ 15:01):    No growth to date.    Culture - Blood (collected 10-17-18 @ 15:39)  Source: .Blood Blood  Preliminary Report (10-18-18 @ 16:01):    No growth to date.    Culture - Blood (collected 10-17-18 @ 13:25)  Source: .Blood Blood  Preliminary Report (10-18-18 @ 14:01):    No growth to date.    Culture - Blood (collected 10-16-18 @ 13:21)  Source: .Blood Blood-Peripheral  Final Report (10-21-18 @ 14:00):    No growth at 5 days.    Culture - Blood (collected 10-16-18 @ 13:20)  Source: .Blood Blood-Peripheral  Final Report (10-21-18 @ 14:00):    No growth at 5 days.    Culture - Blood (collected 10-15-18 @ 15:38)  Source: .Blood Blood-Peripheral  Gram Stain (10-16-18 @ 08:17):    Growth in aerobic bottle: Gram Positive Cocci in Clusters    Growth in anaerobic bottle: Gram Positive Cocci in Clusters  Final Report (10-19-18 @ 09:24):    Growth in aerobic and anaerobic bottles: Staphylococcus haemolyticus    Growth in anaerobic bottle: Staphylococcus epidermidis "Susceptibilities    not performed"    "Due to technical problems, Proteus sp. will Not be reported as part of    the BCID panel until further notice"    ***Blood Panel PCR results on this specimen are available    approximately 3 hours after the Gram stain result.***    Gram stain, PCR, and/or culture results may not always    correspond due to difference in methodologies.    ************************************************************    This PCR assay was performed using [employer].    The following targets are tested for: Enterococcus,    vancomycin resistant enterococci, Listeria monocytogenes,    coagulase negative staphylococci, S. aureus,    methicillin resistant S. aureus, Streptococcus agalactiae    (Group B), S. pneumoniae, S. pyogenes (Group A),    Acinetobacter baumannii, Enterobacter cloacae, E. coli,    Klebsiella oxytoca, K. pneumoniae, Proteus sp.,    Serratia marcescens, Haemophilus influenzae,    Neisseria meningitidis, Pseudomonas aeruginosa, Candida    albicans, C. glabrata, C krusei, C parapsilosis,    C. tropicalis and the KPC resistance gene.  Organism: Blood Culture PCR  Coag Negative Staphylococcus (10-19-18 @ 09:24)  Organism: Coag Negative Staphylococcus (10-19-18 @ 09:24)      -  Ampicillin/Sulbactam: S <=8/4      -  Cefazolin: S <=4      -  Clindamycin: S <=0.25      -  Erythromycin: R >4      -  Gentamicin: S <=1 Should not be used as monotherapy      -  Oxacillin: S <=0.25      -  RIF- Rifampin: S <=1 Should not be used as monotherapy      -  Tetra/Doxy: S <=1      -  Trimethoprim/Sulfamethoxazole: S <=0.5/9.5      -  Vancomycin: S 2      Method Type: TAWANA  Organism: Blood Culture PCR (10-19-18 @ 09:24)      -  Methicillin resistant Staphylococcus aureus (MRSA): Detec      Method Type: PCR    Culture - Blood (collected 10-15-18 @ 15:38)  Source: .Blood Blood-Peripheral  Gram Stain (10-16-18 @ 14:07):    Growth in aerobic bottle: Gram Positive Cocci in Clusters    Growth in anaerobic bottle: Gram Positive Cocci in Clusters  Final Report (10-18-18 @ 12:53):    Growth in aerobic and anaerobic bottles: Staphylococcus haemolyticus    See previous culture [id number]        Culture - Urine (collected 10-15-18 @ 16:54)  Source: .Urine Clean Catch (Midstream)  Final Report (10-16-18 @ 23:32):    <10,000 CFU/ml Normal Urogenital ck present    Physical Examination:  PULM: coarse BS bilaterally  CVS: S1, S2 heard    RADIOLOGY REVIEWED  CXR:     CT chest:    TTE:

## 2018-10-21 NOTE — PROGRESS NOTE ADULT - SUBJECTIVE AND OBJECTIVE BOX
CARDIOLOGY     PROGRESS  NOTE   ________________________________________________    CHIEF COMPLAINT:Patient is a 83y old  Female who presents with a chief complaint of sob (21 Oct 2018 08:14)  doing better.  	  REVIEW OF SYSTEMS:  CONSTITUTIONAL: No fever, weight loss, or fatigue  EYES: No eye pain, visual disturbances, or discharge  ENT:  No difficulty hearing, tinnitus, vertigo; No sinus or throat pain  NECK: No pain or stiffness  RESPIRATORY: No cough, wheezing, chills or hemoptysis; + Shortness of Breath  CARDIOVASCULAR: No chest pain, palpitations, passing out, dizziness, or leg swelling  GASTROINTESTINAL: No abdominal or epigastric pain. No nausea, vomiting, or hematemesis; No diarrhea or constipation. No melena or hematochezia.  GENITOURINARY: No dysuria, frequency, hematuria, or incontinence  NEUROLOGICAL: No headaches, memory loss, loss of strength, numbness, or tremors  SKIN: No itching, burning, rashes, or lesions   LYMPH Nodes: No enlarged glands  ENDOCRINE: No heat or cold intolerance; No hair loss  MUSCULOSKELETAL: No joint pain or swelling; No muscle, back, or extremity pain  PSYCHIATRIC: No depression, anxiety, mood swings, or difficulty sleeping  HEME/LYMPH: No easy bruising, or bleeding gums  ALLERGY AND IMMUNOLOGIC: No hives or eczema	    [ ] All others negative	  [ ] Unable to obtain    PHYSICAL EXAM:  T(C): 36.9 (10-21-18 @ 05:06), Max: 38.8 (10-20-18 @ 11:35)  HR: 100 (10-21-18 @ 05:06) (96 - 122)  BP: 124/81 (10-21-18 @ 05:06) (124/78 - 135/88)  RR: 18 (10-21-18 @ 05:06) (18 - 22)  SpO2: 90% (10-21-18 @ 05:06) (89% - 94%)  Wt(kg): --  I&O's Summary    20 Oct 2018 07:01  -  21 Oct 2018 07:00  --------------------------------------------------------  IN: 350 mL / OUT: 650 mL / NET: -300 mL        Appearance: Normal	  HEENT:   Normal oral mucosa, PERRL, EOMI	  Lymphatic: No lymphadenopathy  Cardiovascular: Normal S1 S2, No JVD, + murmurs, No edema  Respiratory: rhonchi  Psychiatry: A & O x 3, Mood & affect appropriate  Gastrointestinal:  Soft, Non-tender, + BS	  Skin: No rashes, No ecchymoses, No cyanosis	  Neurologic: Non-focal  Extremities: Normal range of motion, No clubbing, cyanosis or edema  Vascular: Peripheral pulses palpable 2+ bilaterally    MEDICATIONS  (STANDING):  acetylcysteine 10% Inhalation 3 milliLiter(s) Inhalation three times a day  ALBUTerol    90 MICROgram(s) HFA Inhaler 1 Puff(s) Inhalation every 4 hours  ALBUTerol   0.042% 1.25 milliGRAM(s) Nebulizer every 6 hours  allopurinol 300 milliGRAM(s) Oral daily  amLODIPine   Tablet 10 milliGRAM(s) Oral daily  azithromycin  IVPB 500 milliGRAM(s) IV Intermittent every 24 hours  azithromycin  IVPB      Biotene Dry Mouth Oral Rinse 5 milliLiter(s) Swish and Spit four times a day  cefepime   IVPB      cefepime   IVPB 1000 milliGRAM(s) IV Intermittent every 12 hours  chlorhexidine 4% Liquid 1 Application(s) Topical <User Schedule>  citalopram 40 milliGRAM(s) Oral daily  furosemide   Injectable 20 milliGRAM(s) IV Push daily  heparin  Injectable 5000 Unit(s) SubCutaneous every 12 hours  ipratropium    for Nebulization 500 MICROGram(s) Nebulizer every 6 hours  levETIRAcetam  Solution 750 milliGRAM(s) Oral two times a day  levothyroxine 50 MICROGram(s) Oral daily  metoprolol tartrate 25 milliGRAM(s) Oral two times a day  pantoprazole   Suspension 40 milliGRAM(s) Oral daily  potassium chloride   Solution 40 milliEquivalent(s) Oral once  potassium chloride  10 mEq/100 mL IVPB 10 milliEquivalent(s) IV Intermittent once  tiotropium 18 MICROgram(s) Capsule 1 Capsule(s) Inhalation daily  vancomycin  IVPB 750 milliGRAM(s) IV Intermittent every 12 hours      TELEMETRY: 	    ECG:  	  RADIOLOGY:  OTHER: 	  	  LABS:	 	    CARDIAC MARKERS:                                11.4   19.28 )-----------( 298      ( 21 Oct 2018 08:18 )             36.5     10-21    146<H>  |  105  |  18  ----------------------------<  180<H>  3.2<L>   |  24  |  0.79    Ca    9.5      21 Oct 2018 07:08    TPro  7.7  /  Alb  3.1<L>  /  TBili  0.4  /  DBili  x   /  AST  15  /  ALT  17  /  AlkPhos  120  10-20    proBNP:   Lipid Profile:   HgA1c: Hemoglobin A1C, Whole Blood: 5.6 % (10-01 @ 07:40)    TSH: Thyroid Stimulating Hormone, Serum: 1.30 uIU/mL (10-01 @ 07:40)    < from: Xray Chest 1 View-PORTABLE IMMEDIATE (10.20.18 @ 11:52) >  There are bilateral lower lobe opacities consistent with atelectasis -   however pneumonia cannot be excluded in the proper clinical context.  No   significant interval change from prior examination.    < end of copied text >        Assessment and plan  ---------------------------  sinus tachycardia/sob sec to aspiration pneumoniae  abx  dc lasix  echo  dvt prophylaxis

## 2018-10-21 NOTE — PROGRESS NOTE ADULT - SUBJECTIVE AND OBJECTIVE BOX
tele, nsr/ tachy to 110  events noted/ hypoxic  on bipap  REVIEW OF SYSTEMS:  GEN: no fever,    no chills  RESP:  SOB,   no cough  CVS: no chest pain,   no palpitations  GI: no abdominal pain,   no nausea,   no vomiting,   no constipation,   no diarrhea  : no dysuria,   no frequency  NEURO: no headache,   no dizziness  PSYCH: no depression,   not anxious  Derm : no rash    MEDICATIONS  (STANDING):  acetylcysteine 10% Inhalation 3 milliLiter(s) Inhalation three times a day  ALBUTerol    90 MICROgram(s) HFA Inhaler 1 Puff(s) Inhalation every 4 hours  ALBUTerol   0.042% 1.25 milliGRAM(s) Nebulizer every 6 hours  allopurinol 300 milliGRAM(s) Oral daily  amLODIPine   Tablet 10 milliGRAM(s) Oral daily  azithromycin  IVPB 500 milliGRAM(s) IV Intermittent every 24 hours  azithromycin  IVPB      Biotene Dry Mouth Oral Rinse 5 milliLiter(s) Swish and Spit four times a day  cefepime   IVPB      cefepime   IVPB 1000 milliGRAM(s) IV Intermittent every 12 hours  chlorhexidine 4% Liquid 1 Application(s) Topical <User Schedule>  citalopram 40 milliGRAM(s) Oral daily  furosemide   Injectable 20 milliGRAM(s) IV Push daily  heparin  Injectable 5000 Unit(s) SubCutaneous every 12 hours  ipratropium    for Nebulization 500 MICROGram(s) Nebulizer every 6 hours  levETIRAcetam  Solution 750 milliGRAM(s) Oral two times a day  levothyroxine 50 MICROGram(s) Oral daily  metoprolol tartrate 25 milliGRAM(s) Oral two times a day  pantoprazole   Suspension 40 milliGRAM(s) Oral daily  potassium chloride   Solution 40 milliEquivalent(s) Oral once  tiotropium 18 MICROgram(s) Capsule 1 Capsule(s) Inhalation daily  vancomycin  IVPB 750 milliGRAM(s) IV Intermittent every 12 hours    MEDICATIONS  (PRN):      Vital Signs Last 24 Hrs  T(C): 36.9 (21 Oct 2018 05:06), Max: 38.8 (20 Oct 2018 11:35)  T(F): 98.4 (21 Oct 2018 05:06), Max: 101.9 (20 Oct 2018 11:35)  HR: 100 (21 Oct 2018 05:06) (96 - 122)  BP: 124/81 (21 Oct 2018 05:06) (124/78 - 135/88)  BP(mean): --  RR: 18 (21 Oct 2018 05:06) (18 - 22)  SpO2: 90% (21 Oct 2018 05:06) (89% - 94%)  CAPILLARY BLOOD GLUCOSE      POCT Blood Glucose.: 207 mg/dL (20 Oct 2018 11:42)    I&O's Summary    20 Oct 2018 07:01  -  21 Oct 2018 07:00  --------------------------------------------------------  IN: 350 mL / OUT: 650 mL / NET: -300 mL        PHYSICAL EXAM:  HEAD:  Atraumatic, Normocephalic  NECK: Supple, No   JVD  CHEST/LUNG:   coarse  breath sounds/   rhonchi  HEART: Regular rate and rhythm;         murmur  ABDOMEN: Soft, Nontender, ;   EXTREMITIES:        edema  NEUROLOGY:  alert    LABS:                        11.2   16.6  )-----------( 268      ( 20 Oct 2018 12:01 )             34.5     10-21    146<H>  |  105  |  18  ----------------------------<  180<H>  3.2<L>   |  24  |  0.79    Ca    9.5      21 Oct 2018 07:08    TPro  7.7  /  Alb  3.1<L>  /  TBili  0.4  /  DBili  x   /  AST  15  /  ALT  17  /  AlkPhos  120  10-20              ABG - ( 20 Oct 2018 11:50 )  pH, Arterial: 7.50  pH, Blood: x     /  pCO2: 32    /  pO2: 83    / HCO3: 25    / Base Excess: 2.8   /  SaO2: 97                10-20 @ 16:28  3.1  30    Hemoglobin A1C, Whole Blood: 5.6 % (10-01 @ 07:40)    Thyroid Stimulating Hormone, Serum: 1.30 uIU/mL (10-01 @ 07:40)          Consultant(s) Notes Reviewed:      Care Discussed with Consultants/Other Providers:

## 2018-10-21 NOTE — PROGRESS NOTE ADULT - ASSESSMENT
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 presents with fever, sob, and what was initially felt to be MRSA bacteremia- PCR +, but in d/w Micro, final report, admission Bld Cxs- no MRSA on plates, growth of 2 strains CoNS- S haemolyticus and S epidermitis; this best explained as gross contamination- all repeat Bld Cxs negative  False + MRSA PCR in setting of overgrowth of CoNS  Multifocal pneumonia primary focus  Remains afebrile, relatively hypoxic, continuous BiPAP, leukocytosis, but now alert, responds approriately  CXR no significant change  Poor resp reserve     Recommendations:  Continue Tx directed at [facility]- Day 3 Vanco + Cefepime  Addition of Azithro noted, Leg Ag ordered  Follow temps and CBC/diff   O2 and BiPAP support as outlined  D/w Dr Segundo

## 2018-10-21 NOTE — CHART NOTE - NSCHARTNOTEFT_GEN_A_CORE
Chief Complaint: hypoxia     NP Note (episodic)     HPI:  Called in by RN because pt O2 Sat was 88 % on bipap. Pt seen and examined at bedside. PT in bed on Bipap, tachypneic, with increased WOB, + accessory muscle use. PT is awake and alert.       REVIEW OF SYSTEMS: Pt is a poor historian but denies pain.     PMH/PSH:  PAST MEDICAL & SURGICAL HISTORY:  CVA (cerebral vascular accident)  ETOH abuse  UTI (lower urinary tract infection)  Dyslipidemia  Gout  Personal history of asbestosis  HTN (hypertension)  MI (myocardial infarction)  History of benign breast tumor  History of left shoulder fracture        Allergies    Toradol (Urticaria (Mild to Mod); Rash (Mild to Mod))    Intolerances          Physical Exam:    Vital Signs Last 24 Hrs  T(C): 36.9 (21 Oct 2018 05:06), Max: 38.8 (20 Oct 2018 11:35)  T(F): 98.4 (21 Oct 2018 05:06), Max: 101.9 (20 Oct 2018 11:35)  HR: 100 (21 Oct 2018 05:06) (96 - 122)  BP: 124/81 (21 Oct 2018 05:06) (124/78 - 135/88)  BP(mean): --  RR: 18 (21 Oct 2018 05:06) (18 - 22)  SpO2: 90% (21 Oct 2018 05:06) (89% - 94%)    General:  NAD, AOx2, nontoxic appearin, obese   Head:  NC/AT, no scleral injection, no JVD   CV: RRR, S1S2   Respiratory: RUL ronchi, + labored increased work of breathing, + accessory muscle use, tachypneic, hypoxic on bipap 10/8 FiO2 60% increased to 80% FiO2  Abdominal: Soft, NT, ND no palpable mass, no guarding or rebound tenderness  MSK: No BLLE edema, + peripheral pulses, FROM all 4 extremity      Labs:                          11.2   16.6  )-----------( 268      ( 20 Oct 2018 12:01 )             34.5     10-20    148<H>  |  109<H>  |  21  ----------------------------<  188<H>  3.4<L>   |  22  |  0.71    Ca    9.0      20 Oct 2018 12:01  Phos  2.9     10-19  Mg     2.4     10-19    TPro  7.7  /  Alb  3.1<L>  /  TBili  0.4  /  DBili  x   /  AST  15  /  ALT  17  /  AlkPhos  120  10-20    ABG - ( 20 Oct 2018 11:50 )  pH, Arterial: 7.50  pH, Blood: x     /  pCO2: 32    /  pO2: 83    / HCO3: 25    / Base Excess: 2.8   /  SaO2: 97                    Radiology:      EXAM:  XR CHEST PORTABLE IMMED 1V                            PROCEDURE DATE:  10/20/2018            INTERPRETATION:  EXAMINATION: XR CHEST PORTABLE IMMED 1V    CLINICAL INDICATION: SOB    TECHNIQUE: Single portable view of the chest was obtained.    COMPARISON: AP view the chest acquired 10/19/2018.    FINDINGS:     Cardia mediastinal silhouette is not well evaluated in this projection.   There are bilateral lower lobe opacities consistent with atelectasis   however pneumonia cannot be excluded in the proper clinical context.   There are additional right upper lobe patchy airspace opacities, also   likely representing pleural thickening and/or fluid in the fissure.   Partially visualized left upper cavity orthopedic hardware again   visualized. A pacer pad overlies the right hemithorax. Degenerative   changes of the thoracic spine are present.    IMPRESSION:   There are bilateral lower lobe opacities consistent with atelectasis -   however pneumonia cannot be excluded in the proper clinical context.  No   significant interval change from prior examination.    Results discussed with Dr. Santhosh Slater of medicine by Dr. Carlson at 11:56 AM   10/20/2018        Assessment & Plan:    Mrs. Scott is an elderly woman with history of CVA admitted with pneumonia, initially cultures showed MRSA and treated only with Vancomycin.  However culture is consistent now with a contaminant.  She has had persistent fever and became tachypneic today.  Chest CT shows multifocal pneumonia with dense consolidation in LLL and likely atelectasis.  Antibiotics were broadened. Pt now p/w with hypoxia on bipap 12/8 FiO2 80%  and increased work of breathing, + accessory muscle use and tachypnea 38-48 breaths per min findings concerning for worsening PNA vs fluid overload vs other pulmonary pathology.     - additional duoneb x 1 dose  -abg stat  -urgent CXR [ ]   -Increased bipap settings to 12/8 80% FiO2   -additional lasix 20 mg IVP given   - solumederol 125 mg IVP x 1 dose given   -Carefully monitor, if pt does not symptomatically improve, would call RRT   - Consider MICU reconsult   -Stopped IVF for now.   Follow up with Primary Team in AM[place]    [place] [id number]

## 2018-10-21 NOTE — PROGRESS NOTE ADULT - ASSESSMENT
82 year old female PMH  h/o hemorrhagic CVA, PEG,  HTN, MI,      HLD, gout   p/w SOB and  acute respiratory distress/ bipap  in er/ ,  elevated wbc and  hypokalemia.    probable aspiration pma/  gram negative  pna/on admission,  was on iv  zosyn  dementia  by  history   ct chest ,  multifocal pna   npo/ on peg feedings  on  synthroid, Kepra , celexa  on dvt ppx.   now  with  MRSA bacteremia/  staph  hemolyticus/ CNS/  s, epidermidis,  mlple  organisms isolated, may  be c/w  contaminant., discussed  with dr stone,on 10/19,   however  may still need  to rx MRSA, which cannot be dismissed as  a  contaminant,   defer to  ID   rpt  blood  c/s, no growth   echo, no vegetations,  normal  ef  per  ID/ dr stone  , will decide  on duration of  ab.    on cefepime/ vanco/ zmax     mild tachypnea/tachycardia.,  chronic  aspiration/  pna/  gram negative  on bipap/  hypokalemia and hypernatremia/  free  water via  peg  pulm  to f/p

## 2018-10-22 DIAGNOSIS — R73.9 HYPERGLYCEMIA, UNSPECIFIED: ICD-10-CM

## 2018-10-22 DIAGNOSIS — R13.10 DYSPHAGIA, UNSPECIFIED: ICD-10-CM

## 2018-10-22 LAB
ANION GAP SERPL CALC-SCNC: 19 MMOL/L — HIGH (ref 5–17)
BASE EXCESS BLDA CALC-SCNC: 2.5 MMOL/L — HIGH (ref -2–2)
BUN SERPL-MCNC: 38 MG/DL — HIGH (ref 7–23)
CALCIUM SERPL-MCNC: 10.2 MG/DL — SIGNIFICANT CHANGE UP (ref 8.4–10.5)
CHLORIDE SERPL-SCNC: 103 MMOL/L — SIGNIFICANT CHANGE UP (ref 96–108)
CO2 BLDA-SCNC: 27 MMOL/L — SIGNIFICANT CHANGE UP (ref 22–30)
CO2 SERPL-SCNC: 24 MMOL/L — SIGNIFICANT CHANGE UP (ref 22–31)
CREAT SERPL-MCNC: 0.89 MG/DL — SIGNIFICANT CHANGE UP (ref 0.5–1.3)
CULTURE RESULTS: SIGNIFICANT CHANGE UP
CULTURE RESULTS: SIGNIFICANT CHANGE UP
GAS PNL BLDA: SIGNIFICANT CHANGE UP
GLUCOSE SERPL-MCNC: 309 MG/DL — HIGH (ref 70–99)
HCO3 BLDA-SCNC: 26 MMOL/L — SIGNIFICANT CHANGE UP (ref 21–29)
HCT VFR BLD CALC: 36.4 % — SIGNIFICANT CHANGE UP (ref 34.5–45)
HGB BLD-MCNC: 11.6 G/DL — SIGNIFICANT CHANGE UP (ref 11.5–15.5)
HOROWITZ INDEX BLDA+IHG-RTO: 80 — SIGNIFICANT CHANGE UP
MCHC RBC-ENTMCNC: 31.7 PG — SIGNIFICANT CHANGE UP (ref 27–34)
MCHC RBC-ENTMCNC: 31.9 GM/DL — LOW (ref 32–36)
MCV RBC AUTO: 99.5 FL — SIGNIFICANT CHANGE UP (ref 80–100)
PCO2 BLDA: 38 MMHG — SIGNIFICANT CHANGE UP (ref 32–46)
PH BLDA: 7.45 — SIGNIFICANT CHANGE UP (ref 7.35–7.45)
PLATELET # BLD AUTO: 336 K/UL — SIGNIFICANT CHANGE UP (ref 150–400)
PO2 BLDA: 122 MMHG — HIGH (ref 74–108)
POTASSIUM SERPL-MCNC: 3.7 MMOL/L — SIGNIFICANT CHANGE UP (ref 3.5–5.3)
POTASSIUM SERPL-SCNC: 3.7 MMOL/L — SIGNIFICANT CHANGE UP (ref 3.5–5.3)
RBC # BLD: 3.66 M/UL — LOW (ref 3.8–5.2)
RBC # FLD: 15.6 % — HIGH (ref 10.3–14.5)
SAO2 % BLDA: 99 % — HIGH (ref 92–96)
SODIUM SERPL-SCNC: 146 MMOL/L — HIGH (ref 135–145)
SPECIMEN SOURCE: SIGNIFICANT CHANGE UP
SPECIMEN SOURCE: SIGNIFICANT CHANGE UP
VANCOMYCIN TROUGH SERPL-MCNC: 20.7 UG/ML — HIGH (ref 10–20)
WBC # BLD: 25.42 K/UL — HIGH (ref 3.8–10.5)
WBC # FLD AUTO: 25.42 K/UL — HIGH (ref 3.8–10.5)

## 2018-10-22 PROCEDURE — 93970 EXTREMITY STUDY: CPT | Mod: 26

## 2018-10-22 PROCEDURE — 93010 ELECTROCARDIOGRAM REPORT: CPT

## 2018-10-22 RX ORDER — INSULIN LISPRO 100/ML
VIAL (ML) SUBCUTANEOUS EVERY 6 HOURS
Qty: 0 | Refills: 0 | Status: DISCONTINUED | OUTPATIENT
Start: 2018-10-22 | End: 2018-11-01

## 2018-10-22 RX ORDER — VANCOMYCIN HCL 1 G
1000 VIAL (EA) INTRAVENOUS EVERY 24 HOURS
Qty: 0 | Refills: 0 | Status: DISCONTINUED | OUTPATIENT
Start: 2018-10-23 | End: 2018-10-29

## 2018-10-22 RX ADMIN — Medication 300 MILLIGRAM(S): at 13:04

## 2018-10-22 RX ADMIN — Medication 50 MICROGRAM(S): at 05:01

## 2018-10-22 RX ADMIN — ALBUTEROL 1.25 MILLIGRAM(S): 90 AEROSOL, METERED ORAL at 17:22

## 2018-10-22 RX ADMIN — Medication 500 MICROGRAM(S): at 13:03

## 2018-10-22 RX ADMIN — Medication 500 MICROGRAM(S): at 17:22

## 2018-10-22 RX ADMIN — Medication 5 MILLILITER(S): at 17:22

## 2018-10-22 RX ADMIN — HEPARIN SODIUM 5000 UNIT(S): 5000 INJECTION INTRAVENOUS; SUBCUTANEOUS at 17:22

## 2018-10-22 RX ADMIN — Medication 25 MILLIGRAM(S): at 17:22

## 2018-10-22 RX ADMIN — PANTOPRAZOLE SODIUM 40 MILLIGRAM(S): 20 TABLET, DELAYED RELEASE ORAL at 13:04

## 2018-10-22 RX ADMIN — LEVETIRACETAM 750 MILLIGRAM(S): 250 TABLET, FILM COATED ORAL at 05:01

## 2018-10-22 RX ADMIN — Medication 3 MILLILITER(S): at 05:01

## 2018-10-22 RX ADMIN — Medication 5 MILLILITER(S): at 13:03

## 2018-10-22 RX ADMIN — Medication 500 MICROGRAM(S): at 05:01

## 2018-10-22 RX ADMIN — Medication 25 MILLIGRAM(S): at 05:01

## 2018-10-22 RX ADMIN — LEVETIRACETAM 750 MILLIGRAM(S): 250 TABLET, FILM COATED ORAL at 17:22

## 2018-10-22 RX ADMIN — CEFEPIME 100 MILLIGRAM(S): 1 INJECTION, POWDER, FOR SOLUTION INTRAMUSCULAR; INTRAVENOUS at 05:01

## 2018-10-22 RX ADMIN — ALBUTEROL 1.25 MILLIGRAM(S): 90 AEROSOL, METERED ORAL at 13:03

## 2018-10-22 RX ADMIN — AZITHROMYCIN 250 MILLIGRAM(S): 500 TABLET, FILM COATED ORAL at 19:03

## 2018-10-22 RX ADMIN — HEPARIN SODIUM 5000 UNIT(S): 5000 INJECTION INTRAVENOUS; SUBCUTANEOUS at 05:02

## 2018-10-22 RX ADMIN — AMLODIPINE BESYLATE 10 MILLIGRAM(S): 2.5 TABLET ORAL at 05:01

## 2018-10-22 RX ADMIN — ALBUTEROL 1.25 MILLIGRAM(S): 90 AEROSOL, METERED ORAL at 05:01

## 2018-10-22 RX ADMIN — Medication 3 MILLILITER(S): at 13:03

## 2018-10-22 RX ADMIN — ALBUTEROL 1.25 MILLIGRAM(S): 90 AEROSOL, METERED ORAL at 22:20

## 2018-10-22 RX ADMIN — Medication 5 MILLILITER(S): at 22:20

## 2018-10-22 RX ADMIN — CITALOPRAM 40 MILLIGRAM(S): 10 TABLET, FILM COATED ORAL at 13:04

## 2018-10-22 RX ADMIN — CEFEPIME 100 MILLIGRAM(S): 1 INJECTION, POWDER, FOR SOLUTION INTRAMUSCULAR; INTRAVENOUS at 17:23

## 2018-10-22 RX ADMIN — Medication 500 MICROGRAM(S): at 22:20

## 2018-10-22 NOTE — PROGRESS NOTE ADULT - SUBJECTIVE AND OBJECTIVE BOX
- Patient seen and examined.  - In summary, patient is a 83 year old woman who presented with sob (22 Oct 2018 07:19)  - Today, patient is without complaints.         *****MEDICATIONS:    MEDICATIONS  (STANDING):  acetylcysteine 10% Inhalation 3 milliLiter(s) Inhalation three times a day  ALBUTerol    90 MICROgram(s) HFA Inhaler 1 Puff(s) Inhalation every 4 hours  ALBUTerol   0.042% 1.25 milliGRAM(s) Nebulizer every 6 hours  allopurinol 300 milliGRAM(s) Oral daily  amLODIPine   Tablet 10 milliGRAM(s) Oral daily  azithromycin  IVPB 500 milliGRAM(s) IV Intermittent every 24 hours  azithromycin  IVPB      Biotene Dry Mouth Oral Rinse 5 milliLiter(s) Swish and Spit four times a day  cefepime   IVPB      cefepime   IVPB 1000 milliGRAM(s) IV Intermittent every 12 hours  chlorhexidine 4% Liquid 1 Application(s) Topical <User Schedule>  citalopram 40 milliGRAM(s) Oral daily  heparin  Injectable 5000 Unit(s) SubCutaneous every 12 hours  ipratropium    for Nebulization 500 MICROGram(s) Nebulizer every 6 hours  levETIRAcetam  Solution 750 milliGRAM(s) Oral two times a day  levothyroxine 50 MICROGram(s) Oral daily  metoprolol tartrate 25 milliGRAM(s) Oral two times a day  pantoprazole   Suspension 40 milliGRAM(s) Oral daily  tiotropium 18 MICROgram(s) Capsule 1 Capsule(s) Inhalation daily  vancomycin  IVPB 750 milliGRAM(s) IV Intermittent every 12 hours    MEDICATIONS  (PRN):           ***** REVIEW OF SYSTEM:  GEN: no fever, no chills, no pain  RESP: no SOB, no cough, no sputum  CVS: no chest pain, no palpitations, no edema  GI: no abdominal pain, no nausea, no vomiting, no constipation, no diarrhea  : no dysuria, no frequency  NEURO: no headache, no dizziness  PSYCH: no depression, not anxious  Derm : no itching, no rash         ***** VITAL SIGNS:  T(F): 97.4 (10-22-18 @ 04:24), Max: 97.9 (10-21-18 @ 21:52)  HR: 95 (10-22-18 @ 04:40) (82 - 105)  BP: 140/84 (10-22-18 @ 04:24) (133/95 - 148/75)  RR: 22 (10-22-18 @ 04:24) (18 - 22)  SpO2: 100% (10-22-18 @ 04:40) (93% - 100%)  Wt(kg): --  ,   I&O's Summary    21 Oct 2018 07:01  -  22 Oct 2018 07:00  --------------------------------------------------------  IN: 410 mL / OUT: 350 mL / NET: 60 mL             *****PHYSICAL EXAM:  GEN: A&O X 3 , NAD , comfortable  HEENT: NCAT, EOMI, MMM, no icterus  NECK: Supple, No JVD  CVS: S1S2 , regular , No M/R/G appreciated  PULM: CTA B/L,  no W/R/R appreciated  ABD.: soft. non tender, non distended,  bowel sounds present  Extrem: intact pulses , no edema noted  Derm: No rash or ecchymosis noted  PSYCH: normal mood, no depression, not anxious         *****LAB AND IMAGIN.4   19.28 )-----------( 298      ( 21 Oct 2018 08:18 )             36.5               10-22    146<H>  |  103  |  38<H>  ----------------------------<  309<H>  3.7   |  24  |  0.89    Ca    10.2      22 Oct 2018 07:50    TPro  7.7  /  Alb  3.1<L>  /  TBili  0.4  /  DBili  x   /  AST  15  /  ALT  17  /  AlkPhos  120  10-20                     ABG - ( 22 Oct 2018 06:36 )  pH, Arterial: 7.45  pH, Blood: x     /  pCO2: 38    /  pO2: 122   / HCO3: 26    / Base Excess: 2.5   /  SaO2: 99                  [All pertinent recent Imaging/Reports reviewed]         *****A S S E S S M E N T   A N D   P L A N :  83F with aspiration pneumonia  initial sinus tachycardia, HR improving  cont abx  keep O˜I  echo pending  dvt prophylaxis    	          __________________________  BRANDON. JACY Sharp.

## 2018-10-22 NOTE — CONSULT NOTE ADULT - SUBJECTIVE AND OBJECTIVE BOX
HPI:  International Travel? No(1)    .	   83F hx CVA, bed bound, PEG dependent, HTN, HLD, BIBEMS for resp distress x1day.  associated with cough,  rigors, subjective fevers. no n/v no abd pain no cp.  pt d oes have a h/o have hx of aspiration pna.	    Admit Diagnosis  Pneumonitis due to inhalation of food or vomitus      ENDOCRINE HPI: 84 y/o Prolonged hospital stay with susp. PNA noted with hyperglycemia 309 mg/dL on am labs.    Patient with no history of diabetes.  HbA1c on admission 5.4%-5.6%.  Patient on PEG feeds at 30 cc/H continuous, tolerating well.  No hyperglycemia noted during this admission.  On 10/21 patient noted with SOB, received treatment which included Solumedrol 125 mg IV X1.  Hyperglycemia noted post treatment.  Ongoing work up by ID for possible infection.  Steroids were not continued.      PAST MEDICAL & SURGICAL HISTORY:  CVA (cerebral vascular accident)  ETOH abuse  UTI (lower urinary tract infection)  Dyslipidemia  Gout  Personal history of asbestosis  HTN (hypertension)  MI (myocardial infarction)  History of benign breast tumor  History of left shoulder fracture  Hypothyroidism.      FAMILY HISTORY:  No pertinent family history in first degree relatives      Social History: admitted from rehab./nursing facility following a fall >1 year ago.    Outpatient Medications: No DM medications.    MEDICATIONS  (STANDING):  ALBUTerol    90 MICROgram(s) HFA Inhaler 1 Puff(s) Inhalation every 4 hours  ALBUTerol   0.042% 1.25 milliGRAM(s) Nebulizer every 6 hours  allopurinol 300 milliGRAM(s) Oral daily  amLODIPine   Tablet 10 milliGRAM(s) Oral daily  azithromycin  IVPB 500 milliGRAM(s) IV Intermittent every 24 hours  azithromycin  IVPB      Biotene Dry Mouth Oral Rinse 5 milliLiter(s) Swish and Spit four times a day  cefepime   IVPB      cefepime   IVPB 1000 milliGRAM(s) IV Intermittent every 12 hours  chlorhexidine 4% Liquid 1 Application(s) Topical <User Schedule>  citalopram 40 milliGRAM(s) Oral daily  heparin  Injectable 5000 Unit(s) SubCutaneous every 12 hours  ipratropium    for Nebulization 500 MICROGram(s) Nebulizer every 6 hours  levETIRAcetam  Solution 750 milliGRAM(s) Oral two times a day  levothyroxine 50 MICROGram(s) Oral daily  metoprolol tartrate 25 milliGRAM(s) Oral two times a day  pantoprazole   Suspension 40 milliGRAM(s) Oral daily  tiotropium 18 MICROgram(s) Capsule 1 Capsule(s) Inhalation daily    MEDICATIONS  (PRN):      Allergies    Toradol (Urticaria (Mild to Mod); Rash (Mild to Mod))    Intolerances        Review of Systems:  Constitutional: No fever, no chills, alert.  Eyes: No blurry vision  Neuro: No tremors  HEENT: No pain  Cardiovascular: No chest pain, palpitations  Respiratory: SOB, on O2 by NC. No cough  GI: No nausea, vomiting, abdominal pain, peg feeds  : No dysuria  Skin: no rash  Psych: no depression, alert responsive.  Hem/lymph: no swelling  Osteoporosis: no fractures    ALL OTHER SYSTEMS REVIEWED AND NEGATIVE      PHYSICAL EXAM:  VITALS: T(C): 36.6 (10-22-18 @ 17:12)  T(F): 97.9 (10-22-18 @ 17:12), Max: 98.5 (10-22-18 @ 11:28)  HR: 94 (10-22-18 @ 17:12) (84 - 97)  BP: 134/81 (10-22-18 @ 17:12) (134/81 - 147/74)  RR:  (18 - 24)  SpO2:  (85% - 100%)  Wt(lbs): --204  GENERAL: NAD, well-developed  EYES: No proptosis, no lid lag, anicteric  HEENT:  Atraumatic, Normocephalic, moist mucous membranes  THYROID: Normal size, no palpable nodules  RESPIRATORY: Clear to auscultation bilaterally; No rales, rhonchi, wheezing  CARDIOVASCULAR: Regular rate and rhythm; No murmurs; peripheral edema  GI: Soft, nontender, non distended, peg placed, normal bowel sounds  SKIN: Dry, intact, No rashes or lesions  MUSCULOSKELETAL: Full range of motion, normal strength  NEURO: no focal deficit.  PSYCH: Alert and oriented x 3, normal affect, normal mood    POCT Blood Glucose.: 207 mg/dL (10-20-18 @ 11:42)                            11.6   25.42 )-----------( 336      ( 22 Oct 2018 10:47 )             36.4       10-22    146<H>  |  103  |  38<H>  ----------------------------<  309<H>  3.7   |  24  |  0.89    Ca    10.2      22 Oct 2018 07:50        Thyroid Function Tests:  10-01 @ 07:40 TSH 1.30 FreeT4 -- T3 -- Anti TPO -- Anti Thyroglobulin Ab -- TSI --      Hemoglobin A1C, Whole Blood: 5.6 % [4.0 - 5.6] (10-01-18 @ 07:40)  Hemoglobin A1C, Whole Blood: 5.4 % [4.0 - 5.6] (09-03-18 @ 10:11)  Hemoglobin A1C, Whole Blood: 5.5 % [4.0 - 5.6] (08-06-18 @ 07:48)          Radiology:

## 2018-10-22 NOTE — PROGRESS NOTE ADULT - ASSESSMENT
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 presents with fever, sob, and what was initially felt to be MRSA bacteremia- PCR +, but in d/w Micro, final report, admission Bld Cxs- no MRSA on plates, growth of 2 strains CoNS- S haemolyticus and S epidermitis; this best explained as gross contamination- all repeat Bld Cxs negative  False + MRSA PCR in setting of overgrowth of CoNS  Multifocal pneumonia primary focus  Remains afebrile, relatively hypoxic, continuous BiPAP, leukocytosis, but now alert, responds approriately  CXR no significant change  Poor resp reserve  Vanco trough of 20.7 is too high   Will check legionella urine antigen, if negative can limit azithromycin to 3-4 doses  suspect a steroid induced leukocytosis  Recommendations:  Continue Tx directed at [facility]- Day 3 Vanco + Cefepime  Addition of Azithro noted, Leg Ag ordered  Follow temps and CBC/diff   O2 and BiPAP support as outlined  outlook guarded 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 presents with fever, sob, and what was initially felt to be MRSA bacteremia- PCR +, but in d/w Micro, final report, admission Bld Cxs- no MRSA on plates, growth of 2 strains CoNS- S haemolyticus and S epidermitis; this best explained as gross contamination- all repeat Bld Cxs negative  False + MRSA PCR in setting of overgrowth of CoNS  Multifocal pneumonia primary focus  Remains afebrile, relatively hypoxic, continuous BiPAP, leukocytosis, but now alert, responds approriately  CXR no significant change  Poor resp reserve  Vanco trough of 20.7 is too high , will change dose from 750 q12 to 1 gram daily  Will check legionella urine antigen, if negative can limit azithromycin to 3-4 doses  suspect a steroid induced leukocytosis  Recommendations:  Continue Tx directed at [facility]- Day 3 Vanco + Cefepime  Addition of Azithro noted, Leg Ag ordered  Follow temps and CBC/diff   O2 and BiPAP support as outlined  outlook guarded

## 2018-10-22 NOTE — PROGRESS NOTE ADULT - SUBJECTIVE AND OBJECTIVE BOX
tele, nsr  on oxygen  REVIEW OF SYSTEMS:  GEN: no fever,    no chills  RESP: SOB,   no cough  CVS: no chest pain,   no palpitations  GI: no abdominal pain,   no nausea,   no vomiting,   no constipation,   no diarrhea  : no dysuria,   no frequency  NEURO: no headache,   no dizziness  PSYCH: no depression,   not anxious  Derm : no rash    MEDICATIONS  (STANDING):  acetylcysteine 10% Inhalation 3 milliLiter(s) Inhalation three times a day  ALBUTerol    90 MICROgram(s) HFA Inhaler 1 Puff(s) Inhalation every 4 hours  ALBUTerol   0.042% 1.25 milliGRAM(s) Nebulizer every 6 hours  allopurinol 300 milliGRAM(s) Oral daily  amLODIPine   Tablet 10 milliGRAM(s) Oral daily  azithromycin  IVPB 500 milliGRAM(s) IV Intermittent every 24 hours  azithromycin  IVPB      Biotene Dry Mouth Oral Rinse 5 milliLiter(s) Swish and Spit four times a day  cefepime   IVPB      cefepime   IVPB 1000 milliGRAM(s) IV Intermittent every 12 hours  chlorhexidine 4% Liquid 1 Application(s) Topical <User Schedule>  citalopram 40 milliGRAM(s) Oral daily  heparin  Injectable 5000 Unit(s) SubCutaneous every 12 hours  ipratropium    for Nebulization 500 MICROGram(s) Nebulizer every 6 hours  levETIRAcetam  Solution 750 milliGRAM(s) Oral two times a day  levothyroxine 50 MICROGram(s) Oral daily  metoprolol tartrate 25 milliGRAM(s) Oral two times a day  pantoprazole   Suspension 40 milliGRAM(s) Oral daily  tiotropium 18 MICROgram(s) Capsule 1 Capsule(s) Inhalation daily  vancomycin  IVPB 750 milliGRAM(s) IV Intermittent every 12 hours    MEDICATIONS  (PRN):      Vital Signs Last 24 Hrs  T(C): 36.3 (22 Oct 2018 04:24), Max: 36.6 (21 Oct 2018 21:52)  T(F): 97.4 (22 Oct 2018 04:24), Max: 97.9 (21 Oct 2018 21:52)  HR: 95 (22 Oct 2018 04:40) (82 - 107)  BP: 140/84 (22 Oct 2018 04:24) (133/95 - 148/75)  BP(mean): --  RR: 22 (22 Oct 2018 04:24) (18 - 22)  SpO2: 100% (22 Oct 2018 04:40) (90% - 100%)  CAPILLARY BLOOD GLUCOSE        I&O's Summary    21 Oct 2018 07:01  -  22 Oct 2018 07:00  --------------------------------------------------------  IN: 410 mL / OUT: 350 mL / NET: 60 mL        PHYSICAL EXAM:  HEAD:  Atraumatic, Normocephalic  NECK: Supple, No   JVD  CHEST/LUNG:   no     rales,     no,    rhonchi  HEART: Regular rate and rhythm;         murmur  ABDOMEN: Soft, Nontender, ;   EXTREMITIES:        edema/  left sided  weakness  NEUROLOGY:  alert    LABS:                        11.4   19.28 )-----------( 298      ( 21 Oct 2018 08:18 )             36.5     10-21    146<H>  |  105  |  18  ----------------------------<  180<H>  3.2<L>   |  24  |  0.79    Ca    9.5      21 Oct 2018 07:08    TPro  7.7  /  Alb  3.1<L>  /  TBili  0.4  /  DBili  x   /  AST  15  /  ALT  17  /  AlkPhos  120  10-20              ABG - ( 22 Oct 2018 06:36 )  pH, Arterial: 7.45  pH, Blood: x     /  pCO2: 38    /  pO2: 122   / HCO3: 26    / Base Excess: 2.5   /  SaO2: 99                10-20 @ 16:28  3.1  30    Hemoglobin A1C, Whole Blood: 5.6 % (10-01 @ 07:40)    Thyroid Stimulating Hormone, Serum: 1.30 uIU/mL (10-01 @ 07:40)          Consultant(s) Notes Reviewed:      Care Discussed with Consultants/Other Providers:

## 2018-10-22 NOTE — PROGRESS NOTE ADULT - SUBJECTIVE AND OBJECTIVE BOX
Follow-up Pulm Progress Note    Awake and alert, failed trial off bipap this morning per RN became tachypenic  97% on Bipap 14/8 80%    Medications:  MEDICATIONS  (STANDING):  acetylcysteine 10% Inhalation 3 milliLiter(s) Inhalation three times a day  ALBUTerol    90 MICROgram(s) HFA Inhaler 1 Puff(s) Inhalation every 4 hours  ALBUTerol   0.042% 1.25 milliGRAM(s) Nebulizer every 6 hours  allopurinol 300 milliGRAM(s) Oral daily  amLODIPine   Tablet 10 milliGRAM(s) Oral daily  azithromycin  IVPB 500 milliGRAM(s) IV Intermittent every 24 hours  azithromycin  IVPB      Biotene Dry Mouth Oral Rinse 5 milliLiter(s) Swish and Spit four times a day  cefepime   IVPB      cefepime   IVPB 1000 milliGRAM(s) IV Intermittent every 12 hours  chlorhexidine 4% Liquid 1 Application(s) Topical <User Schedule>  citalopram 40 milliGRAM(s) Oral daily  heparin  Injectable 5000 Unit(s) SubCutaneous every 12 hours  ipratropium    for Nebulization 500 MICROGram(s) Nebulizer every 6 hours  levETIRAcetam  Solution 750 milliGRAM(s) Oral two times a day  levothyroxine 50 MICROGram(s) Oral daily  metoprolol tartrate 25 milliGRAM(s) Oral two times a day  pantoprazole   Suspension 40 milliGRAM(s) Oral daily  tiotropium 18 MICROgram(s) Capsule 1 Capsule(s) Inhalation daily  vancomycin  IVPB 750 milliGRAM(s) IV Intermittent every 12 hours    MEDICATIONS  (PRN):          Vital Signs Last 24 Hrs  T(C): 36.9 (22 Oct 2018 11:28), Max: 36.9 (22 Oct 2018 11:28)  T(F): 98.5 (22 Oct 2018 11:28), Max: 98.5 (22 Oct 2018 11:28)  HR: 95 (22 Oct 2018 11:28) (82 - 105)  BP: 142/82 (22 Oct 2018 11:28) (133/95 - 147/74)  BP(mean): --  RR: 18 (22 Oct 2018 11:28) (18 - 24)  SpO2: 98% (22 Oct 2018 11:28) (85% - 100%) on 80%    ABG - ( 22 Oct 2018 06:36 )  pH, Arterial: 7.45  pH, Blood: x     /  pCO2: 38    /  pO2: 122   / HCO3: 26    / Base Excess: 2.5   /  SaO2: 99                VBG pH 7.44 10-20 @ 16:28    VBG pCO2 40 10-20 @ 16:28    VBG O2 sat 50 10-20 @ 16:28    VBG lactate 2.0 10-20 @ 16:28      10-21 @ 07:01  -  10-22 @ 07:00  --------------------------------------------------------  IN: 410 mL / OUT: 350 mL / NET: 60 mL          LABS:                        11.6   25.42 )-----------( 336      ( 22 Oct 2018 10:47 )             36.4     10-22    146<H>  |  103  |  38<H>  ----------------------------<  309<H>  3.7   |  24  |  0.89    Ca    10.2      22 Oct 2018 07:50            CAPILLARY BLOOD GLUCOSE                            CULTURES: (if applicable)  Culture Results:   No growth to date. (10-20 @ 21:41)  Culture Results:   No growth to date. (10-20 @ 14:48)  Culture Results:   No growth to date. (10-17 @ 15:39)  Culture Results:   No growth at 5 days. (10-17 @ 13:25)  Culture Results:   No growth at 5 days. (10-16 @ 13:21)  Culture Results:   No growth at 5 days. (10-16 @ 13:20)  Culture Results:   <10,000 CFU/ml Normal Urogenital ck present (10-15 @ 16:54)  Culture Results:   Growth in aerobic and anaerobic bottles: Staphylococcus haemolyticus  See previous culture [id number] (10-15 @ 15:38)  Culture Results:   Growth in aerobic and anaerobic bottles: Staphylococcus haemolyticus  Growth in anaerobic bottle: Staphylococcus epidermidis "Susceptibilities  not performed"  "Due to technical problems, Proteus sp. will Not be reported as part of  the BCID panel until further notice"  ***Blood Panel PCR results on this specimen are available  approximately 3 hours after the Gram stain result.***  Gram stain, PCR, and/or culture results may not always  correspond due to difference in methodologies.  ************************************************************  This PCR assay was performed using [employer].  The following targets are tested for: Enterococcus,  vancomycin resistant enterococci, Listeria monocytogenes,  coagulase negative staphylococci, S. aureus,  methicillin resistant S. aureus, Streptococcus agalactiae  (Group B), S. pneumoniae, S. pyogenes (Group A),  Acinetobacter baumannii, Enterobacter cloacae, E. coli,  Klebsiella oxytoca, K. pneumoniae, Proteus sp.,  Serratia marcescens, Haemophilus influenzae,  Neisseria meningitidis, Pseudomonas aeruginosa, Candida  albicans, C. glabrata, C krusei, C parapsilosis,  C. tropicalis and the KPC resistance gene. (10-15 @ 15:38)    Most recent blood culture -- 10-20 @ 21:41   -- -- .Blood Blood-Peripheral 10-20 @ 21:41  Most recent blood culture -- 10-20 @ 14:48   -- -- .Blood Blood-Venous 10-20 @ 14:48  Most recent blood culture -- 10-17 @ 15:39   -- -- .Blood Blood 10-17 @ 15:39        Physical Examination:  PULM: Clear to auscultation bilaterally, no significant sputum production  CVS: S1, S2 heard    RADIOLOGY REVIEWED  CXR: Follow-up Pulm Progress Note    Awake and alert, failed trial off bipap this morning per RN became tachypenic  97% on Bipap 14/8 80%    Medications:  MEDICATIONS  (STANDING):  acetylcysteine 10% Inhalation 3 milliLiter(s) Inhalation three times a day  ALBUTerol    90 MICROgram(s) HFA Inhaler 1 Puff(s) Inhalation every 4 hours  ALBUTerol   0.042% 1.25 milliGRAM(s) Nebulizer every 6 hours  allopurinol 300 milliGRAM(s) Oral daily  amLODIPine   Tablet 10 milliGRAM(s) Oral daily  azithromycin  IVPB 500 milliGRAM(s) IV Intermittent every 24 hours  azithromycin  IVPB      Biotene Dry Mouth Oral Rinse 5 milliLiter(s) Swish and Spit four times a day  cefepime   IVPB      cefepime   IVPB 1000 milliGRAM(s) IV Intermittent every 12 hours  chlorhexidine 4% Liquid 1 Application(s) Topical <User Schedule>  citalopram 40 milliGRAM(s) Oral daily  heparin  Injectable 5000 Unit(s) SubCutaneous every 12 hours  ipratropium    for Nebulization 500 MICROGram(s) Nebulizer every 6 hours  levETIRAcetam  Solution 750 milliGRAM(s) Oral two times a day  levothyroxine 50 MICROGram(s) Oral daily  metoprolol tartrate 25 milliGRAM(s) Oral two times a day  pantoprazole   Suspension 40 milliGRAM(s) Oral daily  tiotropium 18 MICROgram(s) Capsule 1 Capsule(s) Inhalation daily  vancomycin  IVPB 750 milliGRAM(s) IV Intermittent every 12 hours    MEDICATIONS  (PRN):          Vital Signs Last 24 Hrs  T(C): 36.9 (22 Oct 2018 11:28), Max: 36.9 (22 Oct 2018 11:28)  T(F): 98.5 (22 Oct 2018 11:28), Max: 98.5 (22 Oct 2018 11:28)  HR: 95 (22 Oct 2018 11:28) (82 - 105)  BP: 142/82 (22 Oct 2018 11:28) (133/95 - 147/74)  BP(mean): --  RR: 18 (22 Oct 2018 11:28) (18 - 24)  SpO2: 98% (22 Oct 2018 11:28) (85% - 100%) on 80%    ABG - ( 22 Oct 2018 06:36 )  pH, Arterial: 7.45  pH, Blood: x     /  pCO2: 38    /  pO2: 122   / HCO3: 26    / Base Excess: 2.5   /  SaO2: 99                VBG pH 7.44 10-20 @ 16:28    VBG pCO2 40 10-20 @ 16:28    VBG O2 sat 50 10-20 @ 16:28    VBG lactate 2.0 10-20 @ 16:28      10-21 @ 07:01  -  10-22 @ 07:00  --------------------------------------------------------  IN: 410 mL / OUT: 350 mL / NET: 60 mL          LABS:                        11.6   25.42 )-----------( 336      ( 22 Oct 2018 10:47 )             36.4     10-22    146<H>  |  103  |  38<H>  ----------------------------<  309<H>  3.7   |  24  |  0.89    Ca    10.2      22 Oct 2018 07:50            CAPILLARY BLOOD GLUCOSE                            CULTURES: (if applicable)  Culture Results:   No growth to date. (10-20 @ 21:41)  Culture Results:   No growth to date. (10-20 @ 14:48)  Culture Results:   No growth to date. (10-17 @ 15:39)  Culture Results:   No growth at 5 days. (10-17 @ 13:25)  Culture Results:   No growth at 5 days. (10-16 @ 13:21)  Culture Results:   No growth at 5 days. (10-16 @ 13:20)  Culture Results:   <10,000 CFU/ml Normal Urogenital ck present (10-15 @ 16:54)  Culture Results:   Growth in aerobic and anaerobic bottles: Staphylococcus haemolyticus  See previous culture [id number] (10-15 @ 15:38)  Culture Results:   Growth in aerobic and anaerobic bottles: Staphylococcus haemolyticus  Growth in anaerobic bottle: Staphylococcus epidermidis "Susceptibilities  not performed"  "Due to technical problems, Proteus sp. will Not be reported as part of  the BCID panel until further notice"  ***Blood Panel PCR results on this specimen are available  approximately 3 hours after the Gram stain result.***  Gram stain, PCR, and/or culture results may not always  correspond due to difference in methodologies.  ************************************************************  This PCR assay was performed using [employer].  The following targets are tested for: Enterococcus,  vancomycin resistant enterococci, Listeria monocytogenes,  coagulase negative staphylococci, S. aureus,  methicillin resistant S. aureus, Streptococcus agalactiae  (Group B), S. pneumoniae, S. pyogenes (Group A),  Acinetobacter baumannii, Enterobacter cloacae, E. coli,  Klebsiella oxytoca, K. pneumoniae, Proteus sp.,  Serratia marcescens, Haemophilus influenzae,  Neisseria meningitidis, Pseudomonas aeruginosa, Candida  albicans, C. glabrata, C krusei, C parapsilosis,  C. tropicalis and the KPC resistance gene. (10-15 @ 15:38)    Most recent blood culture -- 10-20 @ 21:41   -- -- .Blood Blood-Peripheral 10-20 @ 21:41  Most recent blood culture -- 10-20 @ 14:48   -- -- .Blood Blood-Venous 10-20 @ 14:48  Most recent blood culture -- 10-17 @ 15:39   -- -- .Blood Blood 10-17 @ 15:39        Physical Examination:  PULM: Clear to auscultation bilaterally, no significant sputum production  CVS: S1, S2 heard    RADIOLOGY REVIEWED  CT chest: < from: CT Chest No Cont (10.16.18 @ 22:21) >  FINDINGS:     AIRWAYS AND LUNGS: The central tracheobronchial tree is patent.    Bilateral lower lobe consolidation and volume loss, greater on the left.   Patchy right upper lobe consolidation and groundglass opacity with   similar findings seen to lesserextent in the left upper lobe.    MEDIASTINUM AND PLEURA: There are no enlarged mediastinal, hilar or   axillary lymph nodes. The visualized portion of the thyroid gland is   unremarkable. There is no pleural effusion. There is no pneumothorax.      HEART AND VESSELS: The heart is normal in size.  There are   atherosclerotic calcifications of the aorta and coronary arteries.  There   is no pericardial effusion.         UPPER ABDOMEN: Images of the upper abdomen demonstrate cholelithiasis.   Gastrostomy tube..     BONES AND SOFT TISSUES: The bones are unremarkable.  Right breast 1.3 cm   nodule (4, 52) is new from the prior study.    TUBES/LINES: None.    IMPRESSION:   Multifocal pneumonia. Follow-up to resolution recommended.    Indeterminateright breast nodule is new from the prior study,   ultrasound/mammography needed for complete evaluation.    < end of copied text >      TTE: < from: Transthoracic Echocardiogram (10.17.18 @ 17:21) >  Conclusions:  1. Calcified trileaflet aortic valve with decreased  opening. Peak transaortic valve gradient equals 22 mm Hg,  mean transaortic valve gradient equals 11 mm Hg, estimated  aortic valve area equals 1.7 sqcm (by continuity equation),  aortic valve velocity time integral equals 35 cm,  consistent with mild aortic stenosis.  2. Increased relative wall thickness with normal left  ventricular mass index, consistent with concentric left  ventricular remodeling.  3. Endocardium not well visualized; grossly normal left  ventricular systolic function.  4. The right ventricle is not well visualized; grossly  normal right ventricular systolic function.  5. Estimated right ventricular systolic pressure equals 26  mm Hg, assuming right atrial pressure equals 8 mm Hg,  consistent with normal pulmonary pressures.  6. No mobile echodensities or vegetations are seen on the  aortic or mitral valve. The tricuspid and pulmonic valves  are not well visualized. Can not exclude endocarditis.  Consider MIKALA if clinically indicated.    < end of copied text > Follow-up Pulm Progress Note    Awake and alert, failed trial off bipap this morning per RN became tachypenic  97% on Bipap 14/8 80%    Medications:  MEDICATIONS  (STANDING):  acetylcysteine 10% Inhalation 3 milliLiter(s) Inhalation three times a day  ALBUTerol    90 MICROgram(s) HFA Inhaler 1 Puff(s) Inhalation every 4 hours  ALBUTerol   0.042% 1.25 milliGRAM(s) Nebulizer every 6 hours  allopurinol 300 milliGRAM(s) Oral daily  amLODIPine   Tablet 10 milliGRAM(s) Oral daily  azithromycin  IVPB 500 milliGRAM(s) IV Intermittent every 24 hours  azithromycin  IVPB      Biotene Dry Mouth Oral Rinse 5 milliLiter(s) Swish and Spit four times a day  cefepime   IVPB      cefepime   IVPB 1000 milliGRAM(s) IV Intermittent every 12 hours  chlorhexidine 4% Liquid 1 Application(s) Topical <User Schedule>  citalopram 40 milliGRAM(s) Oral daily  heparin  Injectable 5000 Unit(s) SubCutaneous every 12 hours  ipratropium    for Nebulization 500 MICROGram(s) Nebulizer every 6 hours  levETIRAcetam  Solution 750 milliGRAM(s) Oral two times a day  levothyroxine 50 MICROGram(s) Oral daily  metoprolol tartrate 25 milliGRAM(s) Oral two times a day  pantoprazole   Suspension 40 milliGRAM(s) Oral daily  tiotropium 18 MICROgram(s) Capsule 1 Capsule(s) Inhalation daily  vancomycin  IVPB 750 milliGRAM(s) IV Intermittent every 12 hours    MEDICATIONS  (PRN):    Vital Signs Last 24 Hrs  T(C): 36.9 (22 Oct 2018 11:28), Max: 36.9 (22 Oct 2018 11:28)  T(F): 98.5 (22 Oct 2018 11:28), Max: 98.5 (22 Oct 2018 11:28)  HR: 95 (22 Oct 2018 11:28) (82 - 105)  BP: 142/82 (22 Oct 2018 11:28) (133/95 - 147/74)  BP(mean): --  RR: 18 (22 Oct 2018 11:28) (18 - 24)  SpO2: 98% (22 Oct 2018 11:28) (85% - 100%) on 80%    ABG - ( 22 Oct 2018 06:36 )  pH, Arterial: 7.45  pH, Blood: x     /  pCO2: 38    /  pO2: 122   / HCO3: 26    / Base Excess: 2.5   /  SaO2: 99        VBG pH 7.44 10-20 @ 16:28    VBG pCO2 40 10-20 @ 16:28    VBG O2 sat 50 10-20 @ 16:28    VBG lactate 2.0 10-20 @ 16:28      10-21 @ 07:01  -  10-22 @ 07:00  --------------------------------------------------------  IN: 410 mL / OUT: 350 mL / NET: 60 mL    LABS:                        11.6   25.42 )-----------( 336      ( 22 Oct 2018 10:47 )             36.4     10-22    146<H>  |  103  |  38<H>  ----------------------------<  309<H>  3.7   |  24  |  0.89    Ca    10.2      22 Oct 2018 07:50    CAPILLARY BLOOD GLUCOSE    CULTURES: (if applicable)  Culture Results:   No growth to date. (10-20 @ 21:41)  Culture Results:   No growth to date. (10-20 @ 14:48)  Culture Results:   No growth to date. (10-17 @ 15:39)  Culture Results:   No growth at 5 days. (10-17 @ 13:25)  Culture Results:   No growth at 5 days. (10-16 @ 13:21)  Culture Results:   No growth at 5 days. (10-16 @ 13:20)  Culture Results:   <10,000 CFU/ml Normal Urogenital ck present (10-15 @ 16:54)  Culture Results:   Growth in aerobic and anaerobic bottles: Staphylococcus haemolyticus  See previous culture [id number] (10-15 @ 15:38)  15 @ 15:38)        Physical Examination:  PULM: Decreased BS at bases  CVS: S1, S2 heard    RADIOLOGY REVIEWED  CT chest: < from: CT Chest No Cont (10.16.18 @ 22:21) >  FINDINGS:     AIRWAYS AND LUNGS: The central tracheobronchial tree is patent.    Bilateral lower lobe consolidation and volume loss, greater on the left.   Patchy right upper lobe consolidation and groundglass opacity with   similar findings seen to lesser extent in the left upper lobe.    MEDIASTINUM AND PLEURA: There are no enlarged mediastinal, hilar or   axillary lymph nodes. The visualized portion of the thyroid gland is   unremarkable. There is no pleural effusion. There is no pneumothorax.      HEART AND VESSELS: The heart is normal in size.  There are   atherosclerotic calcifications of the aorta and coronary arteries.  There   is no pericardial effusion.         UPPER ABDOMEN: Images of the upper abdomen demonstrate cholelithiasis.   Gastrostomy tube..     BONES AND SOFT TISSUES: The bones are unremarkable.  Right breast 1.3 cm   nodule (4, 52) is new from the prior study.    TUBES/LINES: None.    IMPRESSION:   Multifocal pneumonia. Follow-up to resolution recommended.    Indeterminateright breast nodule is new from the prior study,   ultrasound/mammography needed for complete evaluation.    < end of copied text >      TTE: < from: Transthoracic Echocardiogram (10.17.18 @ 17:21) >  Conclusions:  1. Calcified trileaflet aortic valve with decreased  opening. Peak transaortic valve gradient equals 22 mm Hg,  mean transaortic valve gradient equals 11 mm Hg, estimated  aortic valve area equals 1.7 sqcm (by continuity equation),  aortic valve velocity time integral equals 35 cm,  consistent with mild aortic stenosis.  2. Increased relative wall thickness with normal left  ventricular mass index, consistent with concentric left  ventricular remodeling.  3. Endocardium not well visualized; grossly normal left  ventricular systolic function.  4. The right ventricle is not well visualized; grossly  normal right ventricular systolic function.  5. Estimated right ventricular systolic pressure equals 26  mm Hg, assuming right atrial pressure equals 8 mm Hg,  consistent with normal pulmonary pressures.  6. No mobile echodensities or vegetations are seen on the  aortic or mitral valve. The tricuspid and pulmonic valves  are not well visualized. Can not exclude endocarditis.  Consider MIKALA if clinically indicated.    < end of copied text >

## 2018-10-22 NOTE — CONSULT NOTE ADULT - ASSESSMENT
83F hx CVA, bed bound, PEG dependent, HTN, HLD, admitted for SOB. Patient with Prolonged hospital stay with susp. PNA noted with hyperglycemia 309 mg/dL on am labs.    Patient with no history of diabetes.  HbA1c on admission 5.4%-5.6%.  Patient on PEG feeds at 30 cc/H continuous, tolerating well.  No hyperglycemia noted during this admission.  On 10/21 patient noted with SOB, received treatment which included Solumedrol 125 mg IV X1.  Hyperglycemia noted post treatment.  Ongoing work up by ID for possible infection.  Steroids were not continued.    Suspect hyperglycemia is due to high dose steroids treatment on 10/21.  Patient off steroids now.    Suggest:   No change in diet.  FS with scale over 200 mg/dL for 24 hours.  If hyperglycemia resolves will D/C scale.  Continue stnthroid 50 mcg daily.

## 2018-10-22 NOTE — PROGRESS NOTE ADULT - PROBLEM SELECTOR PLAN 1
Patient awake and alert, still on very high fi02 requirements  -Trial on Hi Manny as primary issue is hypoxia not hypercapnea. 50L/min 80%  -Unclear why her hypoxia became acutely worse over the weekend. CXR is grossly unchanged-LLL opacity.   -Abx broadened to Vanco/Cefepime/Azithromycin, leukocytosis continues to rise ?steroid induced. Please check diff in AM  -Check CTA chest to r/o PE  -May need repeat TTE with bubble study  -TTE is grossly normal

## 2018-10-22 NOTE — CHART NOTE - NSCHARTNOTEFT_GEN_A_CORE
Called by RN to report Pt had a run of PAT with HR up to 180s for 9 seconds on tele. Seen and examined at the bedside. Patient is asymptomatic. EKG showed NSR with HR 93bpm. NSR on tele now. Metoprolol given. D/w cardiology Dr. Sharp. Will continue to monitor.    Vital Signs Last 24 Hrs  T(C): 36.6 (22 Oct 2018 17:12), Max: 36.9 (22 Oct 2018 11:28)  T(F): 97.9 (22 Oct 2018 17:12), Max: 98.5 (22 Oct 2018 11:28)  HR: 94 (22 Oct 2018 17:12) (84 - 97)  BP: 134/81 (22 Oct 2018 17:12) (134/81 - 147/74)  BP(mean): --  RR: 22 (22 Oct 2018 17:12) (18 - 24)  SpO2: 94% (22 Oct 2018 17:12) (85% - 100%)                          11.6   25.42 )-----------( 336      ( 22 Oct 2018 10:47 )             36.4       10-22    146<H>  |  103  |  38<H>  ----------------------------<  309<H>  3.7   |  24  |  0.89    Ca    10.2      22 Oct 2018 07:50    [id number]

## 2018-10-22 NOTE — PROGRESS NOTE ADULT - ASSESSMENT
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 CT chest with multifocal PNA. She had RRT on 10/20 for hypoxic respiratory distress, with high fi02 requirements.

## 2018-10-22 NOTE — PROGRESS NOTE ADULT - ASSESSMENT
82 year old female PMH  h/o hemorrhagic CVA, PEG,  HTN, MI,      HLD, gout   p/w SOB and  acute respiratory distress/ bipap  in er/ ,  elevated wbc and  hypokalemia.    probable aspiration pma/  gram negative  pna/on admission,  was on iv  zosyn  dementia  by  history   ct chest ,  multifocal pna   npo/ on peg feedings  on  synthroid, Kepra , celexa  on dvt ppx.   now  with  MRSA bacteremia/  staph  hemolyticus/ CNS/  s, epidermidis,  mlple  organisms isolated   rpt  blood  c/s, no growth   echo, no vegetations,  normal  ef  per  ID/ dr stone  , will decide  on duration of  ab.    on cefepime/ vanco/ zmax     mild tachypnea/tachycardia.,  chronic  aspiration/  pna/  gram negative   hypokalemia and hypernatremia/  free  water via  peg  elevated wbc    labs, pending today 82 year old female PMH  h/o hemorrhagic CVA, PEG,  HTN, MI,      HLD, gout   p/w SOB and  acute respiratory distress/ bipap  in er/ ,  elevated wbc and  hypokalemia.    probable aspiration pma/  gram negative  pna/on admission,  was on iv  zosyn  dementia  by  history   ct chest ,  multifocal pna   npo/ on peg feedings  on  synthroid, Kepra , celexa  on dvt ppx.   now  with  MRSA bacteremia/  staph  hemolyticus/ CNS/  s, epidermidis,  mlple  organisms isolated   rpt  blood  c/s, no growth   echo, no vegetations,  normal  ef  per  ID/ dr stone  , will decide  on duration of  ab.    on cefepime/ vanco/ zmax     improved  tachypnea/tachycardia.,  chronic  aspiration/  pna/  gram negative   hypokalemia and hypernatremia/  free  water via  peg  elevated wbc  wean off bipap/  abg  noted    labs, pending today

## 2018-10-22 NOTE — PROGRESS NOTE ADULT - SUBJECTIVE AND OBJECTIVE BOX
CC: f/u for pneumonia    Patient reports: she is on BIPAP, desaturates when off positive pressure ventilation.    REVIEW OF SYSTEMS:  All other review of systems negative (Comprehensive ROS)    Antimicrobials Day #  :day 3  azithro, day 4 total  azithromycin  IVPB 500 milliGRAM(s) IV Intermittent every 24 hours  azithromycin  IVPB      cefepime   IVPB      cefepime   IVPB 1000 milliGRAM(s) IV Intermittent every 12 hours  vancomycin  IVPB 750 milliGRAM(s) IV Intermittent every 12 hours    Other Medications Reviewed    T(F): 98.5 (10-22-18 @ 11:28), Max: 98.5 (10-22-18 @ 11:28)  HR: 88 (10-22-18 @ 14:54)  BP: 142/82 (10-22-18 @ 11:28)  RR: 18 (10-22-18 @ 11:28)  SpO2: 98% (10-22-18 @ 14:54)  Wt(kg): --    PHYSICAL EXAM:  General: alert, moderate respiratory distress  Eyes:  anicteric, no conjunctival injection, no discharge  Oropharynx: no lesions or injection 	  Neck: supple, without adenopathy  Lungs: distant BS  Heart: regular rate and rhythm; no murmur, rubs or gallops  Abdomen: soft, nondistended, nontender, without mass or organomegaly  Skin: no lesions  Extremities: no clubbing, cyanosis, + edema  Neurologic: alert, oriented, moves all extremities    LAB RESULTS:                        11.6   25.42 )-----------( 336      ( 22 Oct 2018 10:47 )             36.4     10-22    146<H>  |  103  |  38<H>  ----------------------------<  309<H>  3.7   |  24  |  0.89    Ca    10.2      22 Oct 2018 07:50      vanco trough 20.7    MICROBIOLOGY:  RECENT CULTURES:  10-20 @ 21:41 .Blood Blood-Peripheral     No growth to date.      10-20 @ 14:48 .Blood Blood-Venous     No growth to date.      10-17 @ 15:39 .Blood Blood     No growth to date.          RADIOLOGY REVIEWED:    < from: Xray Chest 1 View- PORTABLE-Urgent (10.21.18 @ 07:13) >  IMPRESSION:   1. Interval improvement in right-sided infiltrates.  2. Interval development small left pleural effusion.  3. Persistent basal atelectasis and/or pneumonia.

## 2018-10-23 LAB
ANION GAP SERPL CALC-SCNC: 14 MMOL/L — SIGNIFICANT CHANGE UP (ref 5–17)
BASOPHILS # BLD AUTO: 0.04 K/UL — SIGNIFICANT CHANGE UP (ref 0–0.2)
BASOPHILS NFR BLD AUTO: 0.2 % — SIGNIFICANT CHANGE UP (ref 0–2)
BUN SERPL-MCNC: 42 MG/DL — HIGH (ref 7–23)
CALCIUM SERPL-MCNC: 9.6 MG/DL — SIGNIFICANT CHANGE UP (ref 8.4–10.5)
CHLORIDE SERPL-SCNC: 106 MMOL/L — SIGNIFICANT CHANGE UP (ref 96–108)
CO2 SERPL-SCNC: 27 MMOL/L — SIGNIFICANT CHANGE UP (ref 22–31)
CREAT SERPL-MCNC: 0.76 MG/DL — SIGNIFICANT CHANGE UP (ref 0.5–1.3)
EOSINOPHIL # BLD AUTO: 0 K/UL — SIGNIFICANT CHANGE UP (ref 0–0.5)
EOSINOPHIL NFR BLD AUTO: 0 % — SIGNIFICANT CHANGE UP (ref 0–6)
GLUCOSE BLDC GLUCOMTR-MCNC: 171 MG/DL — HIGH (ref 70–99)
GLUCOSE BLDC GLUCOMTR-MCNC: 197 MG/DL — HIGH (ref 70–99)
GLUCOSE BLDC GLUCOMTR-MCNC: 303 MG/DL — HIGH (ref 70–99)
GLUCOSE BLDC GLUCOMTR-MCNC: 349 MG/DL — HIGH (ref 70–99)
GLUCOSE SERPL-MCNC: 270 MG/DL — HIGH (ref 70–99)
HCT VFR BLD CALC: 34.8 % — SIGNIFICANT CHANGE UP (ref 34.5–45)
HGB BLD-MCNC: 11.3 G/DL — LOW (ref 11.5–15.5)
IMM GRANULOCYTES NFR BLD AUTO: 1.9 % — HIGH (ref 0–1.5)
LYMPHOCYTES # BLD AUTO: 1.22 K/UL — SIGNIFICANT CHANGE UP (ref 1–3.3)
LYMPHOCYTES # BLD AUTO: 6.5 % — LOW (ref 13–44)
MCHC RBC-ENTMCNC: 31.4 PG — SIGNIFICANT CHANGE UP (ref 27–34)
MCHC RBC-ENTMCNC: 32.5 GM/DL — SIGNIFICANT CHANGE UP (ref 32–36)
MCV RBC AUTO: 96.7 FL — SIGNIFICANT CHANGE UP (ref 80–100)
MONOCYTES # BLD AUTO: 0.82 K/UL — SIGNIFICANT CHANGE UP (ref 0–0.9)
MONOCYTES NFR BLD AUTO: 4.4 % — SIGNIFICANT CHANGE UP (ref 2–14)
NEUTROPHILS # BLD AUTO: 16.35 K/UL — HIGH (ref 1.8–7.4)
NEUTROPHILS NFR BLD AUTO: 87 % — HIGH (ref 43–77)
PLATELET # BLD AUTO: 343 K/UL — SIGNIFICANT CHANGE UP (ref 150–400)
POTASSIUM SERPL-MCNC: 3.6 MMOL/L — SIGNIFICANT CHANGE UP (ref 3.5–5.3)
POTASSIUM SERPL-SCNC: 3.6 MMOL/L — SIGNIFICANT CHANGE UP (ref 3.5–5.3)
RAPID RVP RESULT: SIGNIFICANT CHANGE UP
RBC # BLD: 3.6 M/UL — LOW (ref 3.8–5.2)
RBC # FLD: 15.7 % — HIGH (ref 10.3–14.5)
SODIUM SERPL-SCNC: 147 MMOL/L — HIGH (ref 135–145)
WBC # BLD: 18.78 K/UL — HIGH (ref 3.8–10.5)
WBC # FLD AUTO: 18.78 K/UL — HIGH (ref 3.8–10.5)

## 2018-10-23 PROCEDURE — 71045 X-RAY EXAM CHEST 1 VIEW: CPT | Mod: 26

## 2018-10-23 RX ADMIN — Medication 50 MICROGRAM(S): at 05:10

## 2018-10-23 RX ADMIN — LEVETIRACETAM 750 MILLIGRAM(S): 250 TABLET, FILM COATED ORAL at 18:30

## 2018-10-23 RX ADMIN — Medication 25 MILLIGRAM(S): at 18:30

## 2018-10-23 RX ADMIN — Medication 500 MICROGRAM(S): at 18:30

## 2018-10-23 RX ADMIN — LEVETIRACETAM 750 MILLIGRAM(S): 250 TABLET, FILM COATED ORAL at 05:09

## 2018-10-23 RX ADMIN — AMLODIPINE BESYLATE 10 MILLIGRAM(S): 2.5 TABLET ORAL at 05:10

## 2018-10-23 RX ADMIN — Medication 4: at 06:56

## 2018-10-23 RX ADMIN — CITALOPRAM 40 MILLIGRAM(S): 10 TABLET, FILM COATED ORAL at 11:44

## 2018-10-23 RX ADMIN — Medication 300 MILLIGRAM(S): at 11:43

## 2018-10-23 RX ADMIN — Medication 250 MILLIGRAM(S): at 05:10

## 2018-10-23 RX ADMIN — Medication 500 MICROGRAM(S): at 23:21

## 2018-10-23 RX ADMIN — HEPARIN SODIUM 5000 UNIT(S): 5000 INJECTION INTRAVENOUS; SUBCUTANEOUS at 18:35

## 2018-10-23 RX ADMIN — Medication 500 MICROGRAM(S): at 05:09

## 2018-10-23 RX ADMIN — Medication 25 MILLIGRAM(S): at 05:10

## 2018-10-23 RX ADMIN — ALBUTEROL 1.25 MILLIGRAM(S): 90 AEROSOL, METERED ORAL at 23:21

## 2018-10-23 RX ADMIN — CEFEPIME 100 MILLIGRAM(S): 1 INJECTION, POWDER, FOR SOLUTION INTRAMUSCULAR; INTRAVENOUS at 18:31

## 2018-10-23 RX ADMIN — Medication 0: at 18:36

## 2018-10-23 RX ADMIN — Medication 500 MICROGRAM(S): at 11:43

## 2018-10-23 RX ADMIN — ALBUTEROL 1.25 MILLIGRAM(S): 90 AEROSOL, METERED ORAL at 11:43

## 2018-10-23 RX ADMIN — Medication 4: at 00:34

## 2018-10-23 RX ADMIN — ALBUTEROL 1.25 MILLIGRAM(S): 90 AEROSOL, METERED ORAL at 18:30

## 2018-10-23 RX ADMIN — Medication 5 MILLILITER(S): at 18:40

## 2018-10-23 RX ADMIN — Medication 5 MILLILITER(S): at 23:21

## 2018-10-23 RX ADMIN — HEPARIN SODIUM 5000 UNIT(S): 5000 INJECTION INTRAVENOUS; SUBCUTANEOUS at 05:15

## 2018-10-23 RX ADMIN — CHLORHEXIDINE GLUCONATE 1 APPLICATION(S): 213 SOLUTION TOPICAL at 11:58

## 2018-10-23 RX ADMIN — AZITHROMYCIN 250 MILLIGRAM(S): 500 TABLET, FILM COATED ORAL at 19:09

## 2018-10-23 RX ADMIN — CEFEPIME 100 MILLIGRAM(S): 1 INJECTION, POWDER, FOR SOLUTION INTRAMUSCULAR; INTRAVENOUS at 06:56

## 2018-10-23 RX ADMIN — PANTOPRAZOLE SODIUM 40 MILLIGRAM(S): 20 TABLET, DELAYED RELEASE ORAL at 11:44

## 2018-10-23 RX ADMIN — ALBUTEROL 1.25 MILLIGRAM(S): 90 AEROSOL, METERED ORAL at 05:09

## 2018-10-23 RX ADMIN — Medication 5 MILLILITER(S): at 11:56

## 2018-10-23 RX ADMIN — Medication 5 MILLILITER(S): at 05:09

## 2018-10-23 NOTE — PROGRESS NOTE ADULT - PROBLEM SELECTOR PLAN 1
Oxygenation improved today   -Decrease to Hi Manny 40L/min, 45%. If sp02>92% can trial on 6L NC later today   -LE duplex negative   -Unclear why her hypoxia became acutely worse over the weekend. CXR is grossly unchanged-LLL opacity.   -Abx broadened to Vanco/Cefepime/Azithromycin, leukocytosis ?steroid induced  -TTE is grossly normal

## 2018-10-23 NOTE — PROGRESS NOTE ADULT - SUBJECTIVE AND OBJECTIVE BOX
CC: f/u for pneumonia    Patient reports mouth dry    REVIEW OF SYSTEMS:  All other review of systems negative (Comprehensive ROS)    Antimicrobials Day #  :  azithromycin  IVPB 500 milliGRAM(s) IV Intermittent every 24 hours  day 4  azithromycin  IVPB      cefepime   IVPB      cefepime   IVPB 1000 milliGRAM(s) IV Intermittent every 12 hours   day 5  vancomycin  IVPB 1000 milliGRAM(s) IV Intermittent every 24 hours  day 5    Other Medications Reviewed    T(F): 97.7 (10-23-18 @ 11:14), Max: 97.9 (10-22-18 @ 17:12)  HR: 92 (10-23-18 @ 11:14)  BP: 130/81 (10-23-18 @ 11:14)  RR: 20 (10-23-18 @ 11:14)  SpO2: 95% (10-23-18 @ 11:14)  Wt(kg): --    PHYSICAL EXAM:  General: alert,  acute distress  Eyes:  anicteric, no conjunctival injection, no discharge  Oropharynx: no lesions or injection 	  Neck: supple, without adenopathy  Lungs: wheezes to auscultation  Heart: s1s2 2/6 sys m  Abdomen: soft, nondistended, nontender, without mass or organomegaly  Skin: no lesions  Extremities: no clubbing, cyanosis, or edema  Neurologic: alert,left leg contracted    LAB RESULTS:                        11.3   18.78 )-----------( 343      ( 23 Oct 2018 08:17 )             34.8     10-23    147<H>  |  106  |  42<H>  ----------------------------<  270<H>  3.6   |  27  |  0.76    Ca    9.6      23 Oct 2018 07:11          MICROBIOLOGY:  RECENT CULTURES:  10-20 @ 21:41 .Blood Blood-Peripheral     No growth to date.      10-20 @ 14:48 .Blood Blood-Venous     No growth to date.          RADIOLOGY REVIEWED:  < from: Xray Chest 1 View- PORTABLE-Routine (10.23.18 @ 08:59) >  IMPRESSION:   Stable left lower lung atelectasisor pneumonia. Small left pleural   effusion. Follow up to resolution is recommended.    < from: CT Chest No Cont (10.16.18 @ 22:21) >    EXAM:  CT CHEST                            PROCEDURE DATE:  10/16/2018            INTERPRETATION:  EXAMINATION: CT CHEST    CLINICAL INDICATION: Pneumonia.    TECHNIQUE: Noncontrast CT of the chest was obtained.      COMPARISON: 5/18/2012.    FINDINGS:     AIRWAYS AND LUNGS: The central tracheobronchial tree is patent.    Bilateral lower lobe consolidation and volume loss, greater on the left.   Patchy right upper lobe consolidation and groundglass opacity with   similar findings seen to lesserextent in the left upper lobe.    MEDIASTINUM AND PLEURA: There are no enlarged mediastinal, hilar or   axillary lymph nodes. The visualized portion of the thyroid gland is   unremarkable. There is no pleural effusion. There is no pneumothorax.      HEART AND VESSELS: The heart is normal in size.  There are   atherosclerotic calcifications of the aorta and coronary arteries.  There   is no pericardial effusion.         UPPER ABDOMEN: Images of the upper abdomen demonstrate cholelithiasis.   Gastrostomy tube..     BONES AND SOFT TISSUES: The bones are unremarkable.  Right breast 1.3 cm   nodule (4, 52) is new from the prior study.    TUBES/LINES: None.    IMPRESSION:   Multifocal pneumonia. Follow-up to resolution recommended.    Indeterminateright breast nodule is new from the prior study,   ultrasound/mammography needed for complete evaluation.      < end of copied text >  Assessment: Patient with multifocal pneumonia requires high flow oxygen. Originally thought to have mrsa bacteremia but was false positive pcr and she is not truly bacteremic. Ongoing respiratory failure.   Plan: continue vanco cefepime and zithromax  hi flow and steroids per pulmonary  anticipate 3 more days of vanco and cefepime and one more day of zithromax

## 2018-10-23 NOTE — CHART NOTE - NSCHARTNOTEFT_GEN_A_CORE
Upon reassessment this afternoon, patient found to have worsened hypoxia from this morning  Now spo2 79-83% on Hi Manny 40L/min 50%.   Increased to 45L/min 80%, sp02 90%.   -Nonproductive, junky cough  -Check RVP  -CTA chest r/o PE, eval PNA  -Place 20g IV

## 2018-10-23 NOTE — PROGRESS NOTE ADULT - SUBJECTIVE AND OBJECTIVE BOX
- Patient seen and examined.  - In summary, patient is a 83 year old woman who presented with sob (22 Oct 2018 07:19)  - Today, patient is without complaints.         *****MEDICATIONS:    MEDICATIONS  (STANDING):  ALBUTerol    90 MICROgram(s) HFA Inhaler 1 Puff(s) Inhalation every 4 hours  ALBUTerol   0.042% 1.25 milliGRAM(s) Nebulizer every 6 hours  allopurinol 300 milliGRAM(s) Oral daily  amLODIPine   Tablet 10 milliGRAM(s) Oral daily  azithromycin  IVPB 500 milliGRAM(s) IV Intermittent every 24 hours  azithromycin  IVPB      Biotene Dry Mouth Oral Rinse 5 milliLiter(s) Swish and Spit four times a day  cefepime   IVPB      cefepime   IVPB 1000 milliGRAM(s) IV Intermittent every 12 hours  chlorhexidine 4% Liquid 1 Application(s) Topical <User Schedule>  citalopram 40 milliGRAM(s) Oral daily  heparin  Injectable 5000 Unit(s) SubCutaneous every 12 hours  insulin lispro (HumaLOG) corrective regimen sliding scale   SubCutaneous every 6 hours  ipratropium    for Nebulization 500 MICROGram(s) Nebulizer every 6 hours  levETIRAcetam  Solution 750 milliGRAM(s) Oral two times a day  levothyroxine 50 MICROGram(s) Oral daily  metoprolol tartrate 25 milliGRAM(s) Oral two times a day  pantoprazole   Suspension 40 milliGRAM(s) Oral daily  tiotropium 18 MICROgram(s) Capsule 1 Capsule(s) Inhalation daily  vancomycin  IVPB 1000 milliGRAM(s) IV Intermittent every 24 hours    MEDICATIONS  (PRN):             ***** REVIEW OF SYSTEM:  GEN: no fever, no chills, no pain  RESP: no SOB, no cough, no sputum  CVS: no chest pain, no palpitations, no edema  GI: no abdominal pain, no nausea, no vomiting, no constipation, no diarrhea  : no dysuria, no frequency  NEURO: no headache, no dizziness  PSYCH: no depression, not anxious  Derm : no itching, no rash         ***** VITAL SIGNS:    T(F): 97.7 (10-23-18 @ 04:15), Max: 98.5 (10-22-18 @ 11:28)  HR: 74 (10-23-18 @ 05:03) (74 - 95)  BP: 146/68 (10-23-18 @ 04:15) (134/78 - 146/68)  RR: 20 (10-23-18 @ 05:03) (18 - 22)  SpO2: 94% (10-23-18 @ 05:03) (93% - 98%)  Wt(kg): --  ,   I&O's Summary    22 Oct 2018 07:01  -  23 Oct 2018 07:00  --------------------------------------------------------  IN: 300 mL / OUT: 0 mL / NET: 300 mL                   *****PHYSICAL EXAM:  GEN: A&O X 3 , NAD , comfortable  HEENT: NCAT, EOMI, MMM, no icterus  NECK: Supple, No JVD  CVS: S1S2 , regular , No M/R/G appreciated  PULM: CTA B/L,  no W/R/R appreciated  ABD.: soft. non tender, non distended,  bowel sounds present  Extrem: intact pulses , no edema noted  Derm: No rash or ecchymosis noted  PSYCH: normal mood, no depression, not anxious         *****LAB AND IMAGIN.3   18.78 )-----------( 343      ( 23 Oct 2018 08:17 )             34.8               10-23    147<H>  |  106  |  42<H>  ----------------------------<  270<H>  3.6   |  27  |  0.76    Ca    9.6      23 Oct 2018 07:11        [All pertinent recent Imaging/Reports reviewed]         *****A S S E S S M E N T   A N D   P L A N :  83F with aspiration pneumonia  initial sinus tachycardia, HR improved  cont abx  keep O˜I  echo pending  dvt prophylaxis    	      __________________________  CHAKA Sharp D.O.

## 2018-10-23 NOTE — PROGRESS NOTE ADULT - SUBJECTIVE AND OBJECTIVE BOX
s/p  PAT/ TELE  SOB    REVIEW OF SYSTEMS:  GEN: no fever,    no chills  RESP: no SOB,   no cough  CVS: no chest pain,   no palpitations  GI: no abdominal pain,   no nausea,   no vomiting,   no constipation,   no diarrhea  : no dysuria,   no frequency  NEURO: no headache,   no dizziness  PSYCH: no depression,   not anxious  Derm : no rash    MEDICATIONS  (STANDING):  ALBUTerol    90 MICROgram(s) HFA Inhaler 1 Puff(s) Inhalation every 4 hours  ALBUTerol   0.042% 1.25 milliGRAM(s) Nebulizer every 6 hours  allopurinol 300 milliGRAM(s) Oral daily  amLODIPine   Tablet 10 milliGRAM(s) Oral daily  azithromycin  IVPB 500 milliGRAM(s) IV Intermittent every 24 hours  azithromycin  IVPB      Biotene Dry Mouth Oral Rinse 5 milliLiter(s) Swish and Spit four times a day  cefepime   IVPB      cefepime   IVPB 1000 milliGRAM(s) IV Intermittent every 12 hours  chlorhexidine 4% Liquid 1 Application(s) Topical <User Schedule>  citalopram 40 milliGRAM(s) Oral daily  heparin  Injectable 5000 Unit(s) SubCutaneous every 12 hours  insulin lispro (HumaLOG) corrective regimen sliding scale   SubCutaneous every 6 hours  ipratropium    for Nebulization 500 MICROGram(s) Nebulizer every 6 hours  levETIRAcetam  Solution 750 milliGRAM(s) Oral two times a day  levothyroxine 50 MICROGram(s) Oral daily  metoprolol tartrate 25 milliGRAM(s) Oral two times a day  pantoprazole   Suspension 40 milliGRAM(s) Oral daily  tiotropium 18 MICROgram(s) Capsule 1 Capsule(s) Inhalation daily  vancomycin  IVPB 1000 milliGRAM(s) IV Intermittent every 24 hours    MEDICATIONS  (PRN):      Vital Signs Last 24 Hrs  T(C): 36.5 (23 Oct 2018 04:15), Max: 36.9 (22 Oct 2018 11:28)  T(F): 97.7 (23 Oct 2018 04:15), Max: 98.5 (22 Oct 2018 11:28)  HR: 74 (23 Oct 2018 05:03) (74 - 95)  BP: 146/68 (23 Oct 2018 04:15) (134/78 - 146/68)  BP(mean): --  RR: 20 (23 Oct 2018 05:03) (18 - 24)  SpO2: 94% (23 Oct 2018 05:03) (85% - 98%)  CAPILLARY BLOOD GLUCOSE      POCT Blood Glucose.: 349 mg/dL (23 Oct 2018 06:52)  POCT Blood Glucose.: 303 mg/dL (23 Oct 2018 00:21)    I&O's Summary    22 Oct 2018 07:01  -  23 Oct 2018 07:00  --------------------------------------------------------  IN: 300 mL / OUT: 0 mL / NET: 300 mL        PHYSICAL EXAM:  HEAD:  Atraumatic, Normocephalic  NECK: Supple, No   JVD  CHEST/LUNG:   no     rales,     no,    rhonchi  HEART: Regular rate and rhythm;         murmur  ABDOMEN: Soft, Nontender, ;   EXTREMITIES:    no    edema  NEUROLOGY:  alert    LABS:                        11.6   25.42 )-----------( 336      ( 22 Oct 2018 10:47 )             36.4     10-22    146<H>  |  103  |  38<H>  ----------------------------<  309<H>  3.7   |  24  |  0.89    Ca    10.2      22 Oct 2018 07:50                ABG - ( 22 Oct 2018 06:36 )  pH, Arterial: 7.45  pH, Blood: x     /  pCO2: 38    /  pO2: 122   / HCO3: 26    / Base Excess: 2.5   /  SaO2: 99                  Hemoglobin A1C, Whole Blood: 5.6 % (10-01 @ 07:40)    Thyroid Stimulating Hormone, Serum: 1.30 uIU/mL (10-01 @ 07:40)          Consultant(s) Notes Reviewed:      Care Discussed with Consultants/Other Providers: s/p  PAT/ TELE  SOB c/c, on hi flow  oxygen now    REVIEW OF SYSTEMS:  GEN: no fever,    no chills  RESP: no SOB,   no cough  CVS: no chest pain,   no palpitations  GI: no abdominal pain,   no nausea,   no vomiting,   no constipation,   no diarrhea  : no dysuria,   no frequency  NEURO: no headache,   no dizziness  PSYCH: no depression,   not anxious  Derm : no rash    MEDICATIONS  (STANDING):  ALBUTerol    90 MICROgram(s) HFA Inhaler 1 Puff(s) Inhalation every 4 hours  ALBUTerol   0.042% 1.25 milliGRAM(s) Nebulizer every 6 hours  allopurinol 300 milliGRAM(s) Oral daily  amLODIPine   Tablet 10 milliGRAM(s) Oral daily  azithromycin  IVPB 500 milliGRAM(s) IV Intermittent every 24 hours  azithromycin  IVPB      Biotene Dry Mouth Oral Rinse 5 milliLiter(s) Swish and Spit four times a day  cefepime   IVPB      cefepime   IVPB 1000 milliGRAM(s) IV Intermittent every 12 hours  chlorhexidine 4% Liquid 1 Application(s) Topical <User Schedule>  citalopram 40 milliGRAM(s) Oral daily  heparin  Injectable 5000 Unit(s) SubCutaneous every 12 hours  insulin lispro (HumaLOG) corrective regimen sliding scale   SubCutaneous every 6 hours  ipratropium    for Nebulization 500 MICROGram(s) Nebulizer every 6 hours  levETIRAcetam  Solution 750 milliGRAM(s) Oral two times a day  levothyroxine 50 MICROGram(s) Oral daily  metoprolol tartrate 25 milliGRAM(s) Oral two times a day  pantoprazole   Suspension 40 milliGRAM(s) Oral daily  tiotropium 18 MICROgram(s) Capsule 1 Capsule(s) Inhalation daily  vancomycin  IVPB 1000 milliGRAM(s) IV Intermittent every 24 hours    MEDICATIONS  (PRN):      Vital Signs Last 24 Hrs  T(C): 36.5 (23 Oct 2018 04:15), Max: 36.9 (22 Oct 2018 11:28)  T(F): 97.7 (23 Oct 2018 04:15), Max: 98.5 (22 Oct 2018 11:28)  HR: 74 (23 Oct 2018 05:03) (74 - 95)  BP: 146/68 (23 Oct 2018 04:15) (134/78 - 146/68)  BP(mean): --  RR: 20 (23 Oct 2018 05:03) (18 - 24)  SpO2: 94% (23 Oct 2018 05:03) (85% - 98%)  CAPILLARY BLOOD GLUCOSE      POCT Blood Glucose.: 349 mg/dL (23 Oct 2018 06:52)  POCT Blood Glucose.: 303 mg/dL (23 Oct 2018 00:21)    I&O's Summary    22 Oct 2018 07:01  -  23 Oct 2018 07:00  --------------------------------------------------------  IN: 300 mL / OUT: 0 mL / NET: 300 mL        PHYSICAL EXAM:  HEAD:  Atraumatic, Normocephalic  NECK: Supple, No   JVD  CHEST/LUNG:   no     rales,     no,    rhonchi  HEART: Regular rate and rhythm;         murmur  ABDOMEN: Soft, Nontender, ;   EXTREMITIES:    no    edema  NEUROLOGY:  alert    LABS:                        11.6   25.42 )-----------( 336      ( 22 Oct 2018 10:47 )             36.4     10-22    146<H>  |  103  |  38<H>  ----------------------------<  309<H>  3.7   |  24  |  0.89    Ca    10.2      22 Oct 2018 07:50                ABG - ( 22 Oct 2018 06:36 )  pH, Arterial: 7.45  pH, Blood: x     /  pCO2: 38    /  pO2: 122   / HCO3: 26    / Base Excess: 2.5   /  SaO2: 99                  Hemoglobin A1C, Whole Blood: 5.6 % (10-01 @ 07:40)    Thyroid Stimulating Hormone, Serum: 1.30 uIU/mL (10-01 @ 07:40)          Consultant(s) Notes Reviewed:      Care Discussed with Consultants/Other Providers:

## 2018-10-23 NOTE — PROGRESS NOTE ADULT - SUBJECTIVE AND OBJECTIVE BOX
Follow-up Pulm Progress Note    Denies SOB, no complaints  Tolerated Hi Manny overnight, sp02 95% on Hi Manny 50L/min 45%    Medications:  MEDICATIONS  (STANDING):  ALBUTerol    90 MICROgram(s) HFA Inhaler 1 Puff(s) Inhalation every 4 hours  ALBUTerol   0.042% 1.25 milliGRAM(s) Nebulizer every 6 hours  allopurinol 300 milliGRAM(s) Oral daily  amLODIPine   Tablet 10 milliGRAM(s) Oral daily  azithromycin  IVPB 500 milliGRAM(s) IV Intermittent every 24 hours  azithromycin  IVPB      Biotene Dry Mouth Oral Rinse 5 milliLiter(s) Swish and Spit four times a day  cefepime   IVPB      cefepime   IVPB 1000 milliGRAM(s) IV Intermittent every 12 hours  chlorhexidine 4% Liquid 1 Application(s) Topical <User Schedule>  citalopram 40 milliGRAM(s) Oral daily  heparin  Injectable 5000 Unit(s) SubCutaneous every 12 hours  insulin lispro (HumaLOG) corrective regimen sliding scale   SubCutaneous every 6 hours  ipratropium    for Nebulization 500 MICROGram(s) Nebulizer every 6 hours  levETIRAcetam  Solution 750 milliGRAM(s) Oral two times a day  levothyroxine 50 MICROGram(s) Oral daily  metoprolol tartrate 25 milliGRAM(s) Oral two times a day  pantoprazole   Suspension 40 milliGRAM(s) Oral daily  tiotropium 18 MICROgram(s) Capsule 1 Capsule(s) Inhalation daily  vancomycin  IVPB 1000 milliGRAM(s) IV Intermittent every 24 hours    MEDICATIONS  (PRN):          Vital Signs Last 24 Hrs  T(C): 36.5 (23 Oct 2018 11:14), Max: 36.6 (22 Oct 2018 17:12)  T(F): 97.7 (23 Oct 2018 11:14), Max: 97.9 (22 Oct 2018 17:12)  HR: 92 (23 Oct 2018 11:14) (74 - 94)  BP: 130/81 (23 Oct 2018 11:14) (130/81 - 146/68)  BP(mean): --  RR: 20 (23 Oct 2018 11:14) (19 - 22)  SpO2: 95% (23 Oct 2018 11:14) (93% - 98%) on 45%    ABG - ( 22 Oct 2018 06:36 )  pH, Arterial: 7.45  pH, Blood: x     /  pCO2: 38    /  pO2: 122   / HCO3: 26    / Base Excess: 2.5   /  SaO2: 99                    10-22 @ 07:01  -  10-23 @ 07:00  --------------------------------------------------------  IN: 300 mL / OUT: 0 mL / NET: 300 mL          LABS:                        11.3   18.78 )-----------( 343      ( 23 Oct 2018 08:17 )             34.8     10-23    147<H>  |  106  |  42<H>  ----------------------------<  270<H>  3.6   |  27  |  0.76    Ca    9.6      23 Oct 2018 07:11            CAPILLARY BLOOD GLUCOSE      POCT Blood Glucose.: 171 mg/dL (23 Oct 2018 12:00)                        CULTURES: (if applicable)  Culture Results:   No growth to date. (10-20 @ 21:41)  Culture Results:   No growth to date. (10-20 @ 14:48)  Culture Results:   No growth at 5 days. (10-17 @ 15:39)  Culture Results:   No growth at 5 days. (10-17 @ 13:25)  Culture Results:   No growth at 5 days. (10-16 @ 13:21)  Culture Results:   No growth at 5 days. (10-16 @ 13:20)    Most recent blood culture -- 10-20 @ 21:41   -- -- .Blood Blood-Peripheral 10-20 @ 21:41  Most recent blood culture -- 10-20 @ 14:48   -- -- .Blood Blood-Venous 10-20 @ 14:48        Physical Examination:  PULM: Rhonchi bilaterally   CVS: S1, S2 heard    RADIOLOGY REVIEWED  CXR: L effusion/atelectasis   Low lung volumes Follow-up Pulm Progress Note    Denies SOB, no complaints  Tolerated Hi Manny overnight, sp02 95% on Hi Manny 50L/min 45%    Medications:  MEDICATIONS  (STANDING):  ALBUTerol    90 MICROgram(s) HFA Inhaler 1 Puff(s) Inhalation every 4 hours  ALBUTerol   0.042% 1.25 milliGRAM(s) Nebulizer every 6 hours  allopurinol 300 milliGRAM(s) Oral daily  amLODIPine   Tablet 10 milliGRAM(s) Oral daily  azithromycin  IVPB 500 milliGRAM(s) IV Intermittent every 24 hours  azithromycin  IVPB      Biotene Dry Mouth Oral Rinse 5 milliLiter(s) Swish and Spit four times a day  cefepime   IVPB      cefepime   IVPB 1000 milliGRAM(s) IV Intermittent every 12 hours  chlorhexidine 4% Liquid 1 Application(s) Topical <User Schedule>  citalopram 40 milliGRAM(s) Oral daily  heparin  Injectable 5000 Unit(s) SubCutaneous every 12 hours  insulin lispro (HumaLOG) corrective regimen sliding scale   SubCutaneous every 6 hours  ipratropium    for Nebulization 500 MICROGram(s) Nebulizer every 6 hours  levETIRAcetam  Solution 750 milliGRAM(s) Oral two times a day  levothyroxine 50 MICROGram(s) Oral daily  metoprolol tartrate 25 milliGRAM(s) Oral two times a day  pantoprazole   Suspension 40 milliGRAM(s) Oral daily  tiotropium 18 MICROgram(s) Capsule 1 Capsule(s) Inhalation daily  vancomycin  IVPB 1000 milliGRAM(s) IV Intermittent every 24 hours    MEDICATIONS  (PRN):    Vital Signs Last 24 Hrs  T(C): 36.5 (23 Oct 2018 11:14), Max: 36.6 (22 Oct 2018 17:12)  T(F): 97.7 (23 Oct 2018 11:14), Max: 97.9 (22 Oct 2018 17:12)  HR: 92 (23 Oct 2018 11:14) (74 - 94)  BP: 130/81 (23 Oct 2018 11:14) (130/81 - 146/68)  BP(mean): --  RR: 20 (23 Oct 2018 11:14) (19 - 22)  SpO2: 95% (23 Oct 2018 11:14) (93% - 98%) on 45%    ABG - ( 22 Oct 2018 06:36 )  pH, Arterial: 7.45  pH, Blood: x     /  pCO2: 38    /  pO2: 122   / HCO3: 26    / Base Excess: 2.5   /  SaO2: 99        10-22 @ 07:01  -  10-23 @ 07:00  --------------------------------------------------------  IN: 300 mL / OUT: 0 mL / NET: 300 mL    LABS:                        11.3   18.78 )-----------( 343      ( 23 Oct 2018 08:17 )             34.8     10-23    147<H>  |  106  |  42<H>  ----------------------------<  270<H>  3.6   |  27  |  0.76    Ca    9.6      23 Oct 2018 07:11    CAPILLARY BLOOD GLUCOSE    POCT Blood Glucose.: 171 mg/dL (23 Oct 2018 12:00)    CULTURES: (if applicable)  Culture Results:   No growth to date. (10-20 @ 21:41)  Culture Results:   No growth to date. (10-20 @ 14:48)  Culture Results:   No growth at 5 days. (10-17 @ 15:39)  Culture Results:   No growth at 5 days. (10-17 @ 13:25)  Culture Results:   No growth at 5 days. (10-16 @ 13:21)  Culture Results:   No growth at 5 days. (10-16 @ 13:20)    Most recent blood culture -- 10-20 @ 21:41   -- -- .Blood Blood-Peripheral 10-20 @ 21:41  Most recent blood culture -- 10-20 @ 14:48   -- -- .Blood Blood-Venous 10-20 @ 14:48        Physical Examination:  PULM: Rhonchi bilaterally   CVS: S1, S2 heard    RADIOLOGY REVIEWED  CXR: L effusion/atelectasis   Low lung volumes

## 2018-10-23 NOTE — PROGRESS NOTE ADULT - ASSESSMENT
82 year old female PMH  h/o hemorrhagic CVA, PEG,  HTN, MI,      HLD, gout   p/w SOB and  acute respiratory distress/ bipap  in er/ ,  elevated wbc and  hypokalemia.    probable aspiration pma/  gram negative  pna/on admission,  was on iv  zosyn  dementia  by  history   ct chest ,  multifocal pna   npo/ on peg feedings  on  synthroid, Kepra , celexa  on dvt ppx.   now  with  MRSA bacteremia/  staph  hemolyticus/ CNS/  s, epidermidis,  mlple  organisms isolated   rpt  blood  c/s, no growth   echo, no vegetations,  normal  ef  per  ID/ dr stone  , will decide  on duration of  ab.    on cefepime/ vanco/ zmax     improved  tachypnea/tachycardia.,  chronic  aspiration/  pna/  gram negative   hypokalemia and hypernatremia/  free  water via  peg  elevated wbc  difficulty in weaning bipap/ hypoxic/ pulm f/p  dm/  dr gilliam 82 year old female PMH  h/o hemorrhagic CVA, PEG,  HTN, MI,      HLD, gout   p/w SOB and  acute respiratory distress/ bipap  in er/ ,  elevated wbc and  hypokalemia.    probable aspiration pma/  gram negative  pna/on admission,  was on iv  zosyn  dementia  by  history   ct chest ,  multifocal pna   npo/ on peg feedings  on  synthroid, Kepra , celexa  on dvt ppx.   now  with  MRSA bacteremia/  staph  hemolyticus/ CNS/  s, epidermidis,  mlple  organisms isolated   rpt  blood  c/s, no growth   echo, no vegetations,  normal  ef  per  ID/ dr stone  , will decide  on duration of  ab.    on cefepime/ vanco/ zmax     improved  tachypnea/tachycardia.,  chronic  aspiration/  pna/  gram negative   hypokalemia and hypernatremia/  free  water via  peg  elevated wbc   hypoxic/  on high flow o2./  pulm f/p  dm/  dr gilliam 82 year old female PMH  h/o hemorrhagic CVA, PEG,  HTN, MI,      HLD, gout   p/w SOB and  acute respiratory distress/ bipap  in er/ ,  elevated wbc and  hypokalemia.    probable aspiration pma/  gram negative  pna/on admission,  was on iv  zosyn  dementia  by  history   ct chest ,  multifocal pna   npo/ on peg feedings  on  synthroid, Kepra , celexa  on dvt ppx.   now  with  MRSA bacteremia/  staph  hemolyticus/ CNS/  s, epidermidis,  mlple  organisms isolated   rpt  blood  c/s, no growth   echo, no vegetations,  normal  ef  per  ID/ dr stone  , will decide  on duration of  ab.    on cefepime/ vanco/ zmax  ,  chronic  aspiration/  pna/  gram negative    hypernatremia/  free  water via  peg  elevated wbc   hypoxic/  on high flow o2./  pulm f/p  dm/  dr gilliam

## 2018-10-24 LAB
ANION GAP SERPL CALC-SCNC: 14 MMOL/L — SIGNIFICANT CHANGE UP (ref 5–17)
BUN SERPL-MCNC: 38 MG/DL — HIGH (ref 7–23)
CALCIUM SERPL-MCNC: 9.3 MG/DL — SIGNIFICANT CHANGE UP (ref 8.4–10.5)
CHLORIDE SERPL-SCNC: 108 MMOL/L — SIGNIFICANT CHANGE UP (ref 96–108)
CO2 SERPL-SCNC: 26 MMOL/L — SIGNIFICANT CHANGE UP (ref 22–31)
CREAT SERPL-MCNC: 0.73 MG/DL — SIGNIFICANT CHANGE UP (ref 0.5–1.3)
GLUCOSE BLDC GLUCOMTR-MCNC: 109 MG/DL — HIGH (ref 70–99)
GLUCOSE BLDC GLUCOMTR-MCNC: 185 MG/DL — HIGH (ref 70–99)
GLUCOSE BLDC GLUCOMTR-MCNC: 198 MG/DL — HIGH (ref 70–99)
GLUCOSE BLDC GLUCOMTR-MCNC: 223 MG/DL — HIGH (ref 70–99)
GLUCOSE BLDC GLUCOMTR-MCNC: 266 MG/DL — HIGH (ref 70–99)
GLUCOSE SERPL-MCNC: 216 MG/DL — HIGH (ref 70–99)
HCT VFR BLD CALC: 33.8 % — LOW (ref 34.5–45)
HGB BLD-MCNC: 10.7 G/DL — LOW (ref 11.5–15.5)
MCHC RBC-ENTMCNC: 31.7 GM/DL — LOW (ref 32–36)
MCHC RBC-ENTMCNC: 32 PG — SIGNIFICANT CHANGE UP (ref 27–34)
MCV RBC AUTO: 101.2 FL — HIGH (ref 80–100)
PLATELET # BLD AUTO: 357 K/UL — SIGNIFICANT CHANGE UP (ref 150–400)
POTASSIUM SERPL-MCNC: 3.8 MMOL/L — SIGNIFICANT CHANGE UP (ref 3.5–5.3)
POTASSIUM SERPL-SCNC: 3.8 MMOL/L — SIGNIFICANT CHANGE UP (ref 3.5–5.3)
RBC # BLD: 3.34 M/UL — LOW (ref 3.8–5.2)
RBC # FLD: 15.7 % — HIGH (ref 10.3–14.5)
SODIUM SERPL-SCNC: 148 MMOL/L — HIGH (ref 135–145)
WBC # BLD: 13.71 K/UL — HIGH (ref 3.8–10.5)
WBC # FLD AUTO: 13.71 K/UL — HIGH (ref 3.8–10.5)

## 2018-10-24 PROCEDURE — 71275 CT ANGIOGRAPHY CHEST: CPT | Mod: 26

## 2018-10-24 RX ORDER — IPRATROPIUM/ALBUTEROL SULFATE 18-103MCG
3 AEROSOL WITH ADAPTER (GRAM) INHALATION EVERY 6 HOURS
Qty: 0 | Refills: 0 | Status: DISCONTINUED | OUTPATIENT
Start: 2018-10-24 | End: 2018-11-01

## 2018-10-24 RX ADMIN — ALBUTEROL 1.25 MILLIGRAM(S): 90 AEROSOL, METERED ORAL at 11:36

## 2018-10-24 RX ADMIN — Medication 25 MILLIGRAM(S): at 18:31

## 2018-10-24 RX ADMIN — Medication 5 MILLILITER(S): at 18:30

## 2018-10-24 RX ADMIN — Medication 3: at 05:41

## 2018-10-24 RX ADMIN — AMLODIPINE BESYLATE 10 MILLIGRAM(S): 2.5 TABLET ORAL at 05:03

## 2018-10-24 RX ADMIN — Medication 3 MILLILITER(S): at 18:35

## 2018-10-24 RX ADMIN — Medication 500 MICROGRAM(S): at 05:03

## 2018-10-24 RX ADMIN — CEFEPIME 100 MILLIGRAM(S): 1 INJECTION, POWDER, FOR SOLUTION INTRAMUSCULAR; INTRAVENOUS at 18:35

## 2018-10-24 RX ADMIN — LEVETIRACETAM 750 MILLIGRAM(S): 250 TABLET, FILM COATED ORAL at 18:33

## 2018-10-24 RX ADMIN — Medication 3 MILLILITER(S): at 23:23

## 2018-10-24 RX ADMIN — Medication 50 MICROGRAM(S): at 05:02

## 2018-10-24 RX ADMIN — CHLORHEXIDINE GLUCONATE 1 APPLICATION(S): 213 SOLUTION TOPICAL at 08:29

## 2018-10-24 RX ADMIN — Medication 25 MILLIGRAM(S): at 05:02

## 2018-10-24 RX ADMIN — Medication 5 MILLILITER(S): at 11:36

## 2018-10-24 RX ADMIN — PANTOPRAZOLE SODIUM 40 MILLIGRAM(S): 20 TABLET, DELAYED RELEASE ORAL at 11:36

## 2018-10-24 RX ADMIN — Medication 250 MILLIGRAM(S): at 05:06

## 2018-10-24 RX ADMIN — ALBUTEROL 1.25 MILLIGRAM(S): 90 AEROSOL, METERED ORAL at 05:03

## 2018-10-24 RX ADMIN — HEPARIN SODIUM 5000 UNIT(S): 5000 INJECTION INTRAVENOUS; SUBCUTANEOUS at 18:31

## 2018-10-24 RX ADMIN — Medication 5 MILLILITER(S): at 05:14

## 2018-10-24 RX ADMIN — CITALOPRAM 40 MILLIGRAM(S): 10 TABLET, FILM COATED ORAL at 11:36

## 2018-10-24 RX ADMIN — CEFEPIME 100 MILLIGRAM(S): 1 INJECTION, POWDER, FOR SOLUTION INTRAMUSCULAR; INTRAVENOUS at 05:06

## 2018-10-24 RX ADMIN — LEVETIRACETAM 750 MILLIGRAM(S): 250 TABLET, FILM COATED ORAL at 05:02

## 2018-10-24 RX ADMIN — HEPARIN SODIUM 5000 UNIT(S): 5000 INJECTION INTRAVENOUS; SUBCUTANEOUS at 05:03

## 2018-10-24 RX ADMIN — Medication 300 MILLIGRAM(S): at 11:36

## 2018-10-24 NOTE — PROGRESS NOTE ADULT - SUBJECTIVE AND OBJECTIVE BOX
CC: f/u for multifocal pneumonia    Patient reports: no new complaints.She has just returned from CTA.She still is requiring high flow nasal oxygen    REVIEW OF SYSTEMS:  All other review of systems negative (Comprehensive ROS)    Antimicrobials Day #  :day 5 azithromycin, day 6 vanco and cefepime  azithromycin  IVPB 500 milliGRAM(s) IV Intermittent every 24 hours  azithromycin  IVPB      cefepime   IVPB      cefepime   IVPB 1000 milliGRAM(s) IV Intermittent every 12 hours  vancomycin  IVPB 1000 milliGRAM(s) IV Intermittent every 24 hours    Other Medications Reviewed    T(F): 97.5 (10-24-18 @ 12:08), Max: 97.8 (10-23-18 @ 20:58)  HR: 95 (10-24-18 @ 12:08)  BP: 144/83 (10-24-18 @ 12:08)  RR: 18 (10-24-18 @ 12:08)  SpO2: 96% (10-24-18 @ 12:08)  Wt(kg): --    PHYSICAL EXAM:  General: alert, no acute distress, on high flow  Eyes:  anicteric, no conjunctival injection, no discharge  Oropharynx: no lesions or injection 	  Neck: supple, without adenopathy  Lungs: coarse BS  Heart: regular rate and rhythm; no murmur, rubs or gallops  Abdomen: soft, nondistended, nontender, without mass or organomegaly  Skin: no lesions  Extremities: no clubbing, cyanosis, or edema  Neurologic: alert, left side is weak    LAB RESULTS:                        10.7   13.71 )-----------( 357      ( 24 Oct 2018 09:14 )             33.8     10-24    148<H>  |  108  |  38<H>  ----------------------------<  216<H>  3.8   |  26  |  0.73    Ca    9.3      24 Oct 2018 05:46          MICROBIOLOGY:  RECENT CULTURES:  10-20 @ 21:41 .Blood Blood-Peripheral     No growth to date.      10-20 @ 14:48 .Blood Blood-Venous     No growth to date.    RVP negative      RADIOLOGY REVIEWED:  < from: Xray Chest 1 View- PORTABLE-Routine (10.23.18 @ 08:59) >  IMPRESSION:   Stable left lower lung atelectasisor pneumonia. Small left pleural   effusion. Follow up to resolution is recommended.    < end of copied text >

## 2018-10-24 NOTE — PROGRESS NOTE ADULT - ASSESSMENT
83F hx CVA, bed bound, PEG dependent, HTN, HLD, admitted for SOB. Patient with Prolonged hospital stay with susp. PNA noted with hyperglycemia 309 mg/dL on am labs.    Patient with no history of diabetes.  HbA1c on admission 5.4%-5.6%.  Patient on PEG feeds at 30 cc/H continuous, tolerating well.  No hyperglycemia noted during this admission.  On 10/21 patient noted with SOB, received treatment which included Solumedrol 125 mg IV X1.  Hyperglycemia noted post treatment.  Ongoing work up by ID for possible infection.  Steroids were not continued.    Suspect hyperglycemia is due to high dose steroids treatment on 10/21.  Patient off steroids now. with improving infection (WBC down to 13.7), did not get steroids again.  Tolerating Peg feeds with Pivot 1.5.  Still hyperglycemia last two days off steroids, but trending down.  As infection is improving and patient off steroids, normal HbA1c on admission, hyperglycemia may still resolve.    Suggest:   FS with scale over 200 mg/dL Q6 hours.  Continue synthroid 50 mcg daily.

## 2018-10-24 NOTE — PROGRESS NOTE ADULT - PROBLEM SELECTOR PLAN 1
-f/u CTA chest this afternoon 4pm   -LE duplex negative   -Unclear why her hypoxia became acutely worse over the weekend. CXR is grossly unchanged-LLL opacity.   -Abx broadened to Vanco/Cefepime/Azithromycin, leukocytosis ?steroid induced  -TTE is grossly normal

## 2018-10-24 NOTE — CHART NOTE - NSCHARTNOTEFT_GEN_A_CORE
Nutrition Follow Up Note    Patient seen for: nutrition follow up on 4MON    Chart reviewed, events noted. Pt is 83yoF admitted with PNA. PMH significant for dementia, hemorrhagic CVA, HTN, MI, HLD, gout. Pt noted with hyperglycemia on current admit, likely related to large steroid dose- now off steroids. No h/o DM. Last HgbA1c 5.6%. Continue to monitor.    Source: RN, electronic medical record     Diet : NPO with TF via PEG    Patient reports: pt sleeping, confused per chart     PO intake : n/a     Source for PO intake: n/a     Enteral /Parenteral Nutrition: Current TF order is Picot 1.5 @ goal 30ml/hr x 24 hours, total volume 720ml to provide 1,080kcal (13kcal/kg) and 67.5gm protein (0.8g/Kg) based on current wt 84.1Kg, and 546.5ml free H2O.  Recommend change TF order to: Jevity 1.2 start @30mL/hr and increased by 10mL every 4-6 hours to goal rate of 55ml/hr x 24 hrs, total volume 1,200 mL to provide 1,584kcal, 73gm protein (19kcal/Kg and 0.9 gm/Kg based on current wt 84.1 Kg)   Also recommend add 2 Fady via PEG to provide an additional 160 Kcal and 28 Gm amino acids to support collagen formation/wound healing.   Total kcal provided: 1,744kcal (21kcal/Kg current wt 84.1 Kg) and 101g protein (1.2g/Kg current wt 84.1 Kg).      Daily Weight in k (10-24). Pt noted with decreased wt since admit, however likely dosing wt was not accurate. Continue to monitor trend. Pt with fluid shifts.  84.3 (10-23)  92.7Kg dosing (10-15)  % Weight Change    Pertinent Medications: MEDICATIONS  (STANDING):  ALBUTerol/ipratropium for Nebulization 3 milliLiter(s) Nebulizer every 6 hours  allopurinol 300 milliGRAM(s) Oral daily  amLODIPine   Tablet 10 milliGRAM(s) Oral daily  azithromycin  IVPB 500 milliGRAM(s) IV Intermittent every 24 hours  azithromycin  IVPB      Biotene Dry Mouth Oral Rinse 5 milliLiter(s) Swish and Spit four times a day  cefepime   IVPB      cefepime   IVPB 1000 milliGRAM(s) IV Intermittent every 12 hours  chlorhexidine 4% Liquid 1 Application(s) Topical <User Schedule>  citalopram 40 milliGRAM(s) Oral daily  heparin  Injectable 5000 Unit(s) SubCutaneous every 12 hours  insulin lispro (HumaLOG) corrective regimen sliding scale   SubCutaneous every 6 hours  levETIRAcetam  Solution 750 milliGRAM(s) Oral two times a day  levothyroxine 50 MICROGram(s) Oral daily  metoprolol tartrate 25 milliGRAM(s) Oral two times a day  pantoprazole   Suspension 40 milliGRAM(s) Oral daily  tiotropium 18 MICROgram(s) Capsule 1 Capsule(s) Inhalation daily  vancomycin  IVPB 1000 milliGRAM(s) IV Intermittent every 24 hours    MEDICATIONS  (PRN):    Pertinent Labs: 10-24 @ 05:46: Na 148<H>, BUN 38<H>, Cr 0.73, <H>, K+ 3.8, Phos --, Mg --, Alk Phos --, ALT/SGPT --, AST/SGOT --, HbA1c --    Finger Sticks:  POCT Blood Glucose.: 109 mg/dL (10-24 @ 12:09)  POCT Blood Glucose.: 223 mg/dL (10-24 @ 07:47)  POCT Blood Glucose.: 266 mg/dL (10-24 @ 05:39)  POCT Blood Glucose.: 198 mg/dL (10-24 @ 00:32)  POCT Blood Glucose.: 197 mg/dL (10-23 @ 18:35)      Skin per nursing documentation: stage II pressure injury to L lateral cuboid noted  Edema: 1+ generalized edema per nursing documentation    Estimated Needs:   [ ] no change since previous assessment  [x] recalculated: based on current wt 84.1Kg  1,682-2,102 based on 20-25 kcal/Kg  84-101gm protein/day based on 1.0-1.2 gm/Kg    Previous Nutrition Diagnosis: Increased nutrient needs; protein. Being addressed with EN and fady.  Nutrition Diagnosis is: ongoing    New Nutrition Diagnosis: n/a  Related to:    As evidenced by:      Interventions:     Recommend  1) Recommend re-start TF as feasible and change TF order to Jevity 1.2 start @30mL/hr and increased by 10mL every 4-6 hours to goal rate of 55ml/hr x 24 hrs, total volume 1,200 mL to provide 1,584kcal, 73gm protein (19kcal/Kg and 0.9 gm/Kg based on current wt 84.1 Kg). Provide additional fluids as indicated to promote adequate hydration.  2) Recommend add 2 Fady via PEG to provide an additional 160 Kcal and 28 Gm amino acids  3) Obtain wt daily, monitor    Discussed with NP.    Monitoring and Evaluation:     Continue to monitor TF infusion, Tolerance to TF prescription, weights, labs, skin integrity    RD remains available upon request and will follow up per protocol. Di Lee RD, Pager: [phone] Nutrition Follow Up Note    Patient seen for: nutrition follow up on 4MON    Chart reviewed, events noted. Pt is 83yoF admitted with PNA. PMH significant for dementia, hemorrhagic CVA, HTN, MI, HLD, gout. Pt noted with hyperglycemia on current admit, likely related to large steroid dose- now off steroids. No h/o DM. Last HgbA1c 5.6%. Continue to monitor. Pt noted with hypernatremia 10/24. Promote adequate hydration.    Source: RN, electronic medical record     Diet : NPO with TF via PEG    Patient reports: pt sleeping, confused per chart     PO intake : n/a     Source for PO intake: n/a     Enteral /Parenteral Nutrition: Current TF order is Picot 1.5 @ goal 30ml/hr x 24 hours, total volume 720ml to provide 1,080kcal (13kcal/kg) and 67.5gm protein (0.8g/Kg) based on current wt 84.1Kg, and 546.5ml free H2O.  Recommend change TF order to: Jevity 1.2 start @30mL/hr and increased by 10mL every 4-6 hours to goal rate of 55ml/hr x 24 hrs, total volume 1,200 mL to provide 1,584kcal, 73gm protein (19kcal/Kg and 0.9 gm/Kg based on current wt 84.1 Kg)   Also recommend add 2 Fady via PEG to provide an additional 160 Kcal and 28 Gm amino acids to support collagen formation/wound healing.   Total kcal provided: 1,744kcal (21kcal/Kg current wt 84.1 Kg) and 101g protein (1.2g/Kg current wt 84.1 Kg).      Daily Weight in k (10-24). Pt noted with decreased wt since admit, however likely dosing wt was not accurate. Continue to monitor trend. Pt with fluid shifts.  84.3 (10-23)  92.7Kg dosing (10-15)  % Weight Change    Pertinent Medications: MEDICATIONS  (STANDING):  ALBUTerol/ipratropium for Nebulization 3 milliLiter(s) Nebulizer every 6 hours  allopurinol 300 milliGRAM(s) Oral daily  amLODIPine   Tablet 10 milliGRAM(s) Oral daily  azithromycin  IVPB 500 milliGRAM(s) IV Intermittent every 24 hours  azithromycin  IVPB      Biotene Dry Mouth Oral Rinse 5 milliLiter(s) Swish and Spit four times a day  cefepime   IVPB      cefepime   IVPB 1000 milliGRAM(s) IV Intermittent every 12 hours  chlorhexidine 4% Liquid 1 Application(s) Topical <User Schedule>  citalopram 40 milliGRAM(s) Oral daily  heparin  Injectable 5000 Unit(s) SubCutaneous every 12 hours  insulin lispro (HumaLOG) corrective regimen sliding scale   SubCutaneous every 6 hours  levETIRAcetam  Solution 750 milliGRAM(s) Oral two times a day  levothyroxine 50 MICROGram(s) Oral daily  metoprolol tartrate 25 milliGRAM(s) Oral two times a day  pantoprazole   Suspension 40 milliGRAM(s) Oral daily  tiotropium 18 MICROgram(s) Capsule 1 Capsule(s) Inhalation daily  vancomycin  IVPB 1000 milliGRAM(s) IV Intermittent every 24 hours    MEDICATIONS  (PRN):    Pertinent Labs: 10-24 @ 05:46: Na 148<H>, BUN 38<H>, Cr 0.73, <H>, K+ 3.8, Phos --, Mg --, Alk Phos --, ALT/SGPT --, AST/SGOT --, HbA1c --    Finger Sticks:  POCT Blood Glucose.: 109 mg/dL (10-24 @ 12:09)  POCT Blood Glucose.: 223 mg/dL (10-24 @ 07:47)  POCT Blood Glucose.: 266 mg/dL (10-24 @ 05:39)  POCT Blood Glucose.: 198 mg/dL (10-24 @ 00:32)  POCT Blood Glucose.: 197 mg/dL (10-23 @ 18:35)      Skin per nursing documentation: stage II pressure injury to L lateral cuboid noted  Edema: 1+ generalized edema per nursing documentation    Estimated Needs:   [ ] no change since previous assessment  [x] recalculated: based on current wt 84.1Kg  1,682-2,102 based on 20-25 kcal/Kg  84-101gm protein/day based on 1.0-1.2 gm/Kg    Previous Nutrition Diagnosis: Increased nutrient needs; protein. Being addressed with EN and fady.  Nutrition Diagnosis is: ongoing    New Nutrition Diagnosis: n/a  Related to:    As evidenced by:      Interventions:     Recommend  1) Recommend re-start TF as feasible and change TF order to Jevity 1.2 start @30mL/hr and increased by 10mL every 4-6 hours to goal rate of 55ml/hr x 24 hrs, total volume 1,200 mL to provide 1,584kcal, 73gm protein (19kcal/Kg and 0.9 gm/Kg based on current wt 84.1 Kg). Provide additional fluids as indicated to promote adequate hydration.  2) Recommend add 2 Fady via PEG to provide an additional 160 Kcal and 28 Gm amino acids  3) Obtain wt daily, monitor    Discussed with NP.    Monitoring and Evaluation:     Continue to monitor TF infusion, Tolerance to TF prescription, weights, labs, skin integrity    RD remains available upon request and will follow up per protocol. Di Lee RD, Pager: [phone] Nutrition Follow Up Note    Patient seen for: nutrition follow up on 4MON    Chart reviewed, events noted. Pt is 83yoF admitted with PNA. PMH significant for dementia, hemorrhagic CVA, HTN, MI, HLD, gout. Pt noted with hyperglycemia on current admit, likely related to large steroid dose- now off steroids. No h/o DM. Last HgbA1c 5.6%. Continue to monitor. Pt noted with hypernatremia 10/24. Promote adequate hydration.    Source: RN, electronic medical record     Diet : NPO with TF via PEG    Patient reports: pt sleeping, confused per chart     PO intake : n/a     Source for PO intake: n/a     Enteral /Parenteral Nutrition: Current TF order is Picot 1.5 @ goal 30ml/hr x 24 hours, total volume 720ml to provide 1,080kcal (13kcal/kg) and 67.5gm protein (0.8g/Kg) based on current wt 84.1Kg, and 546.5ml free H2O.  Recommend change TF order to: Jevity 1.2 start @30mL/hr and increased by 10mL every 4-6 hours to goal rate of 55ml/hr x 24 hrs, total volume 1,200 mL to provide 1,584kcal, 73gm protein (19kcal/Kg and 0.9 gm/Kg based on current wt 84.1 Kg)   Also recommend add 2 Fady via PEG to provide an additional 160 Kcal and 28 Gm amino acids to support collagen formation/wound healing.   Total kcal provided: 1,744kcal (21kcal/Kg current wt 84.1 Kg) and 101g protein (1.2g/Kg current wt 84.1 Kg).      Daily Weight in k (10-24). Pt noted with decreased wt since admit, however likely dosing wt was not accurate. Continue to monitor trend. Pt with fluid shifts.  84.3 (10-23)  92.7Kg dosing (10-15)  % Weight Change    Pertinent Medications: MEDICATIONS  (STANDING):  ALBUTerol/ipratropium for Nebulization 3 milliLiter(s) Nebulizer every 6 hours  allopurinol 300 milliGRAM(s) Oral daily  amLODIPine   Tablet 10 milliGRAM(s) Oral daily  azithromycin  IVPB 500 milliGRAM(s) IV Intermittent every 24 hours  azithromycin  IVPB      Biotene Dry Mouth Oral Rinse 5 milliLiter(s) Swish and Spit four times a day  cefepime   IVPB      cefepime   IVPB 1000 milliGRAM(s) IV Intermittent every 12 hours  chlorhexidine 4% Liquid 1 Application(s) Topical <User Schedule>  citalopram 40 milliGRAM(s) Oral daily  heparin  Injectable 5000 Unit(s) SubCutaneous every 12 hours  insulin lispro (HumaLOG) corrective regimen sliding scale   SubCutaneous every 6 hours  levETIRAcetam  Solution 750 milliGRAM(s) Oral two times a day  levothyroxine 50 MICROGram(s) Oral daily  metoprolol tartrate 25 milliGRAM(s) Oral two times a day  pantoprazole   Suspension 40 milliGRAM(s) Oral daily  tiotropium 18 MICROgram(s) Capsule 1 Capsule(s) Inhalation daily  vancomycin  IVPB 1000 milliGRAM(s) IV Intermittent every 24 hours    MEDICATIONS  (PRN):    Pertinent Labs: 10-24 @ 05:46: Na 148<H>, BUN 38<H>, Cr 0.73, <H>, K+ 3.8, Phos --, Mg --, Alk Phos --, ALT/SGPT --, AST/SGOT --, HbA1c --    Finger Sticks:  POCT Blood Glucose.: 109 mg/dL (10-24 @ 12:09)  POCT Blood Glucose.: 223 mg/dL (10-24 @ 07:47)  POCT Blood Glucose.: 266 mg/dL (10-24 @ 05:39)  POCT Blood Glucose.: 198 mg/dL (10-24 @ 00:32)  POCT Blood Glucose.: 197 mg/dL (10-23 @ 18:35)      Skin per nursing documentation: stage II pressure injury to L lateral cuboid noted  Edema: 1+ generalized edema per nursing documentation    Estimated Needs:   [ ] no change since previous assessment  [x] recalculated: based on current wt 84.1Kg  1,682-2,102 based on 20-25 kcal/Kg  84-101gm protein/day based on 1.0-1.2 gm/Kg    Previous Nutrition Diagnosis: Increased nutrient needs; protein. Being addressed with EN and fady.  Nutrition Diagnosis is: ongoing    New Nutrition Diagnosis: n/a  Related to:    As evidenced by:      Interventions:     Recommend  1) Recommend re-start TF as feasible and change TF order to Jevity 1.2 start @30mL/hr and increased by 10mL every 4-6 hours to goal rate of 55ml/hr x 24 hrs, total volume 1,200 mL to provide 1,584kcal, 73gm protein (19kcal/Kg and 0.9 gm/Kg based on current wt 84.1 Kg). Provide additional fluids as indicated to promote adequate hydration.  2) Recommend add 2 Fady via PEG to provide an additional 160 Kcal and 28 Gm amino acids  3) Obtain wt daily, monitor    Monitoring and Evaluation:     Continue to monitor TF infusion, Tolerance to TF prescription, weights, labs, skin integrity    RD remains available upon request and will follow up per protocol. Di Lee RD, Pager: [phone] Nutrition Follow Up Note    Patient seen for: nutrition follow up on 4MON    Chart reviewed, events noted. Pt is 83yoF admitted with PNA. PMH significant for dementia, hemorrhagic CVA, HTN, MI, HLD, gout. Pt noted with hyperglycemia on current admit, likely related to large steroid dose- now off steroids. No h/o DM. Last HgbA1c 5.6%. Continue to monitor. Pt noted with hypernatremia 10/24. Promote adequate hydration.    Source: RN, electronic medical record     Diet : NPO with TF via PEG. Per RN, pt was tolerating TF well, no GI distress, last BM today 10/24. TF observed on hold currently pending procedure.    Patient reports: pt sleeping, confused per chart     PO intake : n/a     Source for PO intake: n/a     Enteral /Parenteral Nutrition: Current TF order is Picot 1.5 @ goal 30ml/hr x 24 hours, total volume 720ml to provide 1,080kcal (13kcal/kg) and 67.5gm protein (0.8g/Kg) based on current wt 84.1Kg, and 546.5ml free H2O.  Recommend change TF order to: Jevity 1.2 start @30mL/hr and increased by 10mL every 4-6 hours to goal rate of 55ml/hr x 24 hrs, total volume 1,200 mL to provide 1,584kcal, 73gm protein (19kcal/Kg and 0.9 gm/Kg based on current wt 84.1 Kg)   Also recommend add 2 Fady via PEG to provide an additional 160 Kcal and 28 Gm amino acids to support collagen formation/wound healing.   Total kcal provided: 1,744kcal (21kcal/Kg current wt 84.1 Kg) and 101g protein (1.2g/Kg current wt 84.1 Kg).      Daily Weight in k (10-24). Pt noted with decreased wt since admit, however likely dosing wt was not accurate. Continue to monitor trend. Pt with fluid shifts.  84.3 (10-)  92.7Kg dosing (10-15)  % Weight Change    Pertinent Medications: MEDICATIONS  (STANDING):  ALBUTerol/ipratropium for Nebulization 3 milliLiter(s) Nebulizer every 6 hours  allopurinol 300 milliGRAM(s) Oral daily  amLODIPine   Tablet 10 milliGRAM(s) Oral daily  azithromycin  IVPB 500 milliGRAM(s) IV Intermittent every 24 hours  azithromycin  IVPB      Biotene Dry Mouth Oral Rinse 5 milliLiter(s) Swish and Spit four times a day  cefepime   IVPB      cefepime   IVPB 1000 milliGRAM(s) IV Intermittent every 12 hours  chlorhexidine 4% Liquid 1 Application(s) Topical <User Schedule>  citalopram 40 milliGRAM(s) Oral daily  heparin  Injectable 5000 Unit(s) SubCutaneous every 12 hours  insulin lispro (HumaLOG) corrective regimen sliding scale   SubCutaneous every 6 hours  levETIRAcetam  Solution 750 milliGRAM(s) Oral two times a day  levothyroxine 50 MICROGram(s) Oral daily  metoprolol tartrate 25 milliGRAM(s) Oral two times a day  pantoprazole   Suspension 40 milliGRAM(s) Oral daily  tiotropium 18 MICROgram(s) Capsule 1 Capsule(s) Inhalation daily  vancomycin  IVPB 1000 milliGRAM(s) IV Intermittent every 24 hours    MEDICATIONS  (PRN):    Pertinent Labs: 10-24 @ 05:46: Na 148<H>, BUN 38<H>, Cr 0.73, <H>, K+ 3.8, Phos --, Mg --, Alk Phos --, ALT/SGPT --, AST/SGOT --, HbA1c --    Finger Sticks:  POCT Blood Glucose.: 109 mg/dL (10-24 @ 12:09)  POCT Blood Glucose.: 223 mg/dL (10-24 @ 07:47)  POCT Blood Glucose.: 266 mg/dL (10-24 @ 05:39)  POCT Blood Glucose.: 198 mg/dL (10-24 @ 00:32)  POCT Blood Glucose.: 197 mg/dL (10-23 @ 18:35)      Skin per nursing documentation: stage II pressure injury to L lateral cuboid noted  Edema: 1+ generalized edema per nursing documentation    Estimated Needs:   [ ] no change since previous assessment  [x] recalculated: based on current wt 84.1Kg  1,682-2,102 based on 20-25 kcal/Kg  84-101gm protein/day based on 1.0-1.2 gm/Kg    Previous Nutrition Diagnosis: Increased nutrient needs; protein. Being addressed with EN and fady.  Nutrition Diagnosis is: ongoing    New Nutrition Diagnosis: n/a  Related to:    As evidenced by:      Interventions:     Recommend  1) Recommend re-start TF as feasible and change TF order to Jevity 1.2 start @30mL/hr and increased by 10mL every 4-6 hours to goal rate of 55ml/hr x 24 hrs, total volume 1,200 mL to provide 1,584kcal, 73gm protein (19kcal/Kg and 0.9 gm/Kg based on current wt 84.1 Kg). Provide additional fluids as indicated to promote adequate hydration.  2) Recommend add 2 Fady via PEG to provide an additional 160 Kcal and 28 Gm amino acids  3) Obtain wt daily, monitor    Monitoring and Evaluation:     Continue to monitor TF infusion, Tolerance to TF prescription, weights, labs, skin integrity    RD remains available upon request and will follow up per protocol. Di Lee RD, Pager: [phone]

## 2018-10-24 NOTE — PROGRESS NOTE ADULT - SUBJECTIVE AND OBJECTIVE BOX
Chief Complaint: 82 y/o Prolonged hospital stay with susp. PNA noted with hyperglycemia 309 mg/dL on am labs.    Patient with improving infection, not given steroids again.  On Peg feeds with Pivot 1.5, tolerating feeds well.  Still hyperglycemia last two days off steroids, but trending down.    MEDICATIONS  (STANDING):  ALBUTerol/ipratropium for Nebulization 3 milliLiter(s) Nebulizer every 6 hours  allopurinol 300 milliGRAM(s) Oral daily  amLODIPine   Tablet 10 milliGRAM(s) Oral daily  Biotene Dry Mouth Oral Rinse 5 milliLiter(s) Swish and Spit four times a day  cefepime   IVPB      cefepime   IVPB 1000 milliGRAM(s) IV Intermittent every 12 hours  chlorhexidine 4% Liquid 1 Application(s) Topical <User Schedule>  citalopram 40 milliGRAM(s) Oral daily  heparin  Injectable 5000 Unit(s) SubCutaneous every 12 hours  insulin lispro (HumaLOG) corrective regimen sliding scale   SubCutaneous every 6 hours  levETIRAcetam  Solution 750 milliGRAM(s) Oral two times a day  levothyroxine 50 MICROGram(s) Oral daily  metoprolol tartrate 25 milliGRAM(s) Oral two times a day  pantoprazole   Suspension 40 milliGRAM(s) Oral daily  tiotropium 18 MICROgram(s) Capsule 1 Capsule(s) Inhalation daily  vancomycin  IVPB 1000 milliGRAM(s) IV Intermittent every 24 hours    MEDICATIONS  (PRN):      Allergies    Toradol (Urticaria (Mild to Mod); Rash (Mild to Mod))    Intolerances        PHYSICAL EXAM:  VITALS: T(C): 36.4 (10-24-18 @ 16:45)  T(F): 97.6 (10-24-18 @ 16:45), Max: 97.8 (10-23-18 @ 20:58)  HR: 100 (10-24-18 @ 18:27) (72 - 100)  BP: 126/73 (10-24-18 @ 18:27) (119/78 - 144/83)  RR:  (17 - 22)  SpO2:  (95% - 99%)  GENERAL: well-developed O2 NC,   EYES: No proptosis, no lid lag, anicteric  HEENT:  Atraumatic, Normocephalic, moist mucous membranes  THYROID: Normal size, no palpable nodules  RESPIRATORY: Clear to auscultation bilaterally; No rales, rhonchi, wheezing  CARDIOVASCULAR: Regular rate and rhythm; No murmurs; peripheral edema  GI: Soft, nontender, non distended, peg placed, normal bowel sounds  SKIN: Dry, intact, No rashes or lesions  MUSCULOSKELETAL: Full range of motion, normal strength  NEURO: no focal deficit.  PSYCH: Alert and oriented x 3, normal affect, normal mood        POCT Blood Glucose.: 109 mg/dL (10-24-18 @ 12:09)  POCT Blood Glucose.: 223 mg/dL (10-24-18 @ 07:47)  POCT Blood Glucose.: 266 mg/dL (10-24-18 @ 05:39)  POCT Blood Glucose.: 198 mg/dL (10-24-18 @ 00:32)  POCT Blood Glucose.: 197 mg/dL (10-23-18 @ 18:35)  POCT Blood Glucose.: 171 mg/dL (10-23-18 @ 12:00)  POCT Blood Glucose.: 349 mg/dL (10-23-18 @ 06:52)  POCT Blood Glucose.: 303 mg/dL (10-23-18 @ 00:21)                            10.7   13.71 )-----------( 357      ( 24 Oct 2018 09:14 )             33.8       10-24    148<H>  |  108  |  38<H>  ----------------------------<  216<H>  3.8   |  26  |  0.73    EGFR if : 90  EGFR if non : 77    Ca    9.3      10-24        Thyroid Function Tests:  10-01 @ 07:40 TSH 1.30 FreeT4 -- T3 -- Anti TPO -- Anti Thyroglobulin Ab -- TSI --      Hemoglobin A1C, Whole Blood: 5.6 % [4.0 - 5.6] (10-01-18 @ 07:40)  Hemoglobin A1C, Whole Blood: 5.4 % [4.0 - 5.6] (09-03-18 @ 10:11)  Hemoglobin A1C, Whole Blood: 5.5 % [4.0 - 5.6] (08-06-18 @ 07:48)

## 2018-10-24 NOTE — PROGRESS NOTE ADULT - ASSESSMENT
82 year old female PMH  h/o hemorrhagic CVA, PEG,  HTN, MI,      HLD, gout   p/w SOB and  acute respiratory distress/ bipap  in er/ ,  elevated wbc and  hypokalemia.    probable aspiration pma/  gram negative  pna/on admission,  was on iv  zosyn  dementia  by  history   ct chest ,  multifocal pna   npo/ on peg feedings,  on  synthroid, Kepra , celexa     now  with  MRSA bacteremia/  staph  hemolyticus/ CNS/  s, epidermidis,  mlple  organisms isolated   rpt  blood  c/s, no growth   echo, no vegetations,  normal  ef    on  Cefepime/ Vanco/ zmax  ,  chronic  aspiration/  pna/  gram negative    hypernatremia/  free  water via  peg  elevated wbc   hypoxic/  on high flow o2./  pulm f/p  dm/  dr gilliam  awaiting cta  chest

## 2018-10-24 NOTE — PROGRESS NOTE ADULT - SUBJECTIVE AND OBJECTIVE BOX
Follow-up Pulm Progress Note    No new respiratory events overnight.  Denies SOB/CP.   93% on Hi Manny 50L/min 80%    Medications:  MEDICATIONS  (STANDING):  ALBUTerol/ipratropium for Nebulization 3 milliLiter(s) Nebulizer every 6 hours  allopurinol 300 milliGRAM(s) Oral daily  amLODIPine   Tablet 10 milliGRAM(s) Oral daily  azithromycin  IVPB 500 milliGRAM(s) IV Intermittent every 24 hours  azithromycin  IVPB      Biotene Dry Mouth Oral Rinse 5 milliLiter(s) Swish and Spit four times a day  cefepime   IVPB      cefepime   IVPB 1000 milliGRAM(s) IV Intermittent every 12 hours  chlorhexidine 4% Liquid 1 Application(s) Topical <User Schedule>  citalopram 40 milliGRAM(s) Oral daily  heparin  Injectable 5000 Unit(s) SubCutaneous every 12 hours  insulin lispro (HumaLOG) corrective regimen sliding scale   SubCutaneous every 6 hours  levETIRAcetam  Solution 750 milliGRAM(s) Oral two times a day  levothyroxine 50 MICROGram(s) Oral daily  metoprolol tartrate 25 milliGRAM(s) Oral two times a day  pantoprazole   Suspension 40 milliGRAM(s) Oral daily  tiotropium 18 MICROgram(s) Capsule 1 Capsule(s) Inhalation daily  vancomycin  IVPB 1000 milliGRAM(s) IV Intermittent every 24 hours    MEDICATIONS  (PRN):          Vital Signs Last 24 Hrs  T(C): 36.4 (24 Oct 2018 12:08), Max: 36.6 (23 Oct 2018 20:58)  T(F): 97.5 (24 Oct 2018 12:08), Max: 97.8 (23 Oct 2018 20:58)  HR: 95 (24 Oct 2018 12:08) (72 - 95)  BP: 144/83 (24 Oct 2018 12:08) (119/78 - 144/83)  BP(mean): --  RR: 18 (24 Oct 2018 12:08) (18 - 22)  SpO2: 96% (24 Oct 2018 12:08) (95% - 99%) on Hi Manny          10-23 @ 07:01  -  10-24 @ 07:00  --------------------------------------------------------  IN: 1320 mL / OUT: 800 mL / NET: 520 mL          LABS:                        10.7   13.71 )-----------( 357      ( 24 Oct 2018 09:14 )             33.8     10-24    148<H>  |  108  |  38<H>  ----------------------------<  216<H>  3.8   |  26  |  0.73    Ca    9.3      24 Oct 2018 05:46            CAPILLARY BLOOD GLUCOSE      POCT Blood Glucose.: 109 mg/dL (24 Oct 2018 12:09)                        CULTURES: (if applicable)  Culture Results:   No growth to date. (10-20 @ 21:41)  Culture Results:   No growth to date. (10-20 @ 14:48)  Culture Results:   No growth at 5 days. (10-17 @ 15:39)    Most recent blood culture -- 10-20 @ 21:41   -- -- .Blood Blood-Peripheral 10-20 @ 21:41  Most recent blood culture -- 10-20 @ 14:48   -- -- .Blood Blood-Venous 10-20 @ 14:48        Physical Examination:  PULM: Rhonchi bilaterally   CVS: S1, S2 heard    RADIOLOGY REVIEWED  CXR: Low lung volumes  L basilar atelectasis/infiltrate Follow-up Pulm Progress Note    No new respiratory events overnight.  Denies SOB/CP.   93% on Hi Manny 50L/min 80%    Medications:  MEDICATIONS  (STANDING):  ALBUTerol/ipratropium for Nebulization 3 milliLiter(s) Nebulizer every 6 hours  allopurinol 300 milliGRAM(s) Oral daily  amLODIPine   Tablet 10 milliGRAM(s) Oral daily  azithromycin  IVPB 500 milliGRAM(s) IV Intermittent every 24 hours  azithromycin  IVPB      Biotene Dry Mouth Oral Rinse 5 milliLiter(s) Swish and Spit four times a day  cefepime   IVPB      cefepime   IVPB 1000 milliGRAM(s) IV Intermittent every 12 hours  chlorhexidine 4% Liquid 1 Application(s) Topical <User Schedule>  citalopram 40 milliGRAM(s) Oral daily  heparin  Injectable 5000 Unit(s) SubCutaneous every 12 hours  insulin lispro (HumaLOG) corrective regimen sliding scale   SubCutaneous every 6 hours  levETIRAcetam  Solution 750 milliGRAM(s) Oral two times a day  levothyroxine 50 MICROGram(s) Oral daily  metoprolol tartrate 25 milliGRAM(s) Oral two times a day  pantoprazole   Suspension 40 milliGRAM(s) Oral daily  tiotropium 18 MICROgram(s) Capsule 1 Capsule(s) Inhalation daily  vancomycin  IVPB 1000 milliGRAM(s) IV Intermittent every 24 hours    MEDICATIONS  (PRN):    Vital Signs Last 24 Hrs  T(C): 36.4 (24 Oct 2018 12:08), Max: 36.6 (23 Oct 2018 20:58)  T(F): 97.5 (24 Oct 2018 12:08), Max: 97.8 (23 Oct 2018 20:58)  HR: 95 (24 Oct 2018 12:08) (72 - 95)  BP: 144/83 (24 Oct 2018 12:08) (119/78 - 144/83)  BP(mean): --  RR: 18 (24 Oct 2018 12:08) (18 - 22)  SpO2: 96% (24 Oct 2018 12:08) (95% - 99%) on Hi Manny    10-23 @ 07:01  -  10-24 @ 07:00  --------------------------------------------------------  IN: 1320 mL / OUT: 800 mL / NET: 520 mL    LABS:                        10.7   13.71 )-----------( 357      ( 24 Oct 2018 09:14 )             33.8     10-24    148<H>  |  108  |  38<H>  ----------------------------<  216<H>  3.8   |  26  |  0.73    Ca    9.3      24 Oct 2018 05:46    CAPILLARY BLOOD GLUCOSE      POCT Blood Glucose.: 109 mg/dL (24 Oct 2018 12:09)    CULTURES: (if applicable)  Culture Results:   No growth to date. (10-20 @ 21:41)    Physical Examination:  PULM: Rhonchi bilaterally   CVS: S1, S2 heard    RADIOLOGY REVIEWED  CXR: Low lung volumes  L basilar atelectasis/infiltrate

## 2018-10-24 NOTE — PROGRESS NOTE ADULT - SUBJECTIVE AND OBJECTIVE BOX
mild  sob/ on bipap    REVIEW OF SYSTEMS:  GEN: no fever,    no chills  RESP:  SOB,   no cough  CVS: no chest pain,   no palpitations  GI: no abdominal pain,   no nausea,   no vomiting,   no constipation,   no diarrhea  : no dysuria,   no frequency  NEURO: no headache,   no dizziness  PSYCH: no depression,   not anxious  Derm : no rash    MEDICATIONS  (STANDING):  ALBUTerol    90 MICROgram(s) HFA Inhaler 1 Puff(s) Inhalation every 4 hours  ALBUTerol   0.042% 1.25 milliGRAM(s) Nebulizer every 6 hours  allopurinol 300 milliGRAM(s) Oral daily  amLODIPine   Tablet 10 milliGRAM(s) Oral daily  azithromycin  IVPB 500 milliGRAM(s) IV Intermittent every 24 hours  azithromycin  IVPB      Biotene Dry Mouth Oral Rinse 5 milliLiter(s) Swish and Spit four times a day  cefepime   IVPB      cefepime   IVPB 1000 milliGRAM(s) IV Intermittent every 12 hours  chlorhexidine 4% Liquid 1 Application(s) Topical <User Schedule>  citalopram 40 milliGRAM(s) Oral daily  heparin  Injectable 5000 Unit(s) SubCutaneous every 12 hours  insulin lispro (HumaLOG) corrective regimen sliding scale   SubCutaneous every 6 hours  ipratropium    for Nebulization 500 MICROGram(s) Nebulizer every 6 hours  levETIRAcetam  Solution 750 milliGRAM(s) Oral two times a day  levothyroxine 50 MICROGram(s) Oral daily  metoprolol tartrate 25 milliGRAM(s) Oral two times a day  pantoprazole   Suspension 40 milliGRAM(s) Oral daily  tiotropium 18 MICROgram(s) Capsule 1 Capsule(s) Inhalation daily  vancomycin  IVPB 1000 milliGRAM(s) IV Intermittent every 24 hours    MEDICATIONS  (PRN):      Vital Signs Last 24 Hrs  T(C): 36.2 (24 Oct 2018 03:52), Max: 36.6 (23 Oct 2018 20:58)  T(F): 97.2 (24 Oct 2018 03:52), Max: 97.8 (23 Oct 2018 20:58)  HR: 72 (24 Oct 2018 06:11) (72 - 93)  BP: 133/83 (24 Oct 2018 03:52) (119/78 - 133/83)  BP(mean): --  RR: 20 (24 Oct 2018 06:11) (20 - 21)  SpO2: 97% (24 Oct 2018 06:11) (94% - 99%)  CAPILLARY BLOOD GLUCOSE      POCT Blood Glucose.: 266 mg/dL (24 Oct 2018 05:39)  POCT Blood Glucose.: 198 mg/dL (24 Oct 2018 00:32)  POCT Blood Glucose.: 197 mg/dL (23 Oct 2018 18:35)  POCT Blood Glucose.: 171 mg/dL (23 Oct 2018 12:00)    I&O's Summary    23 Oct 2018 07:01  -  24 Oct 2018 07:00  --------------------------------------------------------  IN: 1320 mL / OUT: 800 mL / NET: 520 mL        PHYSICAL EXAM:  HEAD:  Atraumatic, Normocephalic  NECK: Supple, No   JVD  CHEST/LUNG:   few  coarse  bretah sounds. no   rhonchi  HEART: Regular rate and rhythm;         murmur  ABDOMEN: Soft, Nontender, ;   EXTREMITIES:        edema  NEUROLOGY:  alert    LABS:                        11.3   18.78 )-----------( 343      ( 23 Oct 2018 08:17 )             34.8     10-24    148<H>  |  108  |  38<H>  ----------------------------<  216<H>  3.8   |  26  |  0.73    Ca    9.3      24 Oct 2018 05:46                    Hemoglobin A1C, Whole Blood: 5.6 % (10-01 @ 07:40)    Thyroid Stimulating Hormone, Serum: 1.30 uIU/mL (10-01 @ 07:40)          Consultant(s) Notes Reviewed:      Care Discussed with Consultants/Other Providers:

## 2018-10-24 NOTE — PROGRESS NOTE ADULT - ASSESSMENT
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 presents with fever, sob, and what was initially felt to be MRSA bacteremia- PCR +, but in d/w Micro, final report, admission Bld Cxs- no MRSA on plates, growth of 2 strains CoNS- S haemolyticus and S epidermitis; this best explained as gross contamination- all repeat Bld Cxs negative  False + MRSA PCR in setting of overgrowth of CoNS  Multifocal pneumonia primary focus  Remains afebrile, relatively hypoxic, continuous BiPAP, leukocytosis, but now alert, responds approriately  CXR no significant change  Poor resp reserve  suspect a steroid induced leukocytosis  Recommendations:  Continue Tx directed at [facility]- Day 6 Vanco + Cefepime  Addition of Azithro noted, 5 days should be adequate  Follow temps and CBC/diff   O2 and BiPAP support as outlined  CTA pending,outlook guarded

## 2018-10-24 NOTE — PROGRESS NOTE ADULT - SUBJECTIVE AND OBJECTIVE BOX
- Patient seen and examined.  - In summary, patient is a 83 year old woman who presented with sob (22 Oct 2018 07:19)  - Today, patient is without complaints.         *****MEDICATIONS:    MEDICATIONS  (STANDING):  ALBUTerol    90 MICROgram(s) HFA Inhaler 1 Puff(s) Inhalation every 4 hours  ALBUTerol   0.042% 1.25 milliGRAM(s) Nebulizer every 6 hours  allopurinol 300 milliGRAM(s) Oral daily  amLODIPine   Tablet 10 milliGRAM(s) Oral daily  azithromycin  IVPB 500 milliGRAM(s) IV Intermittent every 24 hours  azithromycin  IVPB      Biotene Dry Mouth Oral Rinse 5 milliLiter(s) Swish and Spit four times a day  cefepime   IVPB      cefepime   IVPB 1000 milliGRAM(s) IV Intermittent every 12 hours  chlorhexidine 4% Liquid 1 Application(s) Topical <User Schedule>  citalopram 40 milliGRAM(s) Oral daily  heparin  Injectable 5000 Unit(s) SubCutaneous every 12 hours  insulin lispro (HumaLOG) corrective regimen sliding scale   SubCutaneous every 6 hours  ipratropium    for Nebulization 500 MICROGram(s) Nebulizer every 6 hours  levETIRAcetam  Solution 750 milliGRAM(s) Oral two times a day  levothyroxine 50 MICROGram(s) Oral daily  metoprolol tartrate 25 milliGRAM(s) Oral two times a day  pantoprazole   Suspension 40 milliGRAM(s) Oral daily  tiotropium 18 MICROgram(s) Capsule 1 Capsule(s) Inhalation daily  vancomycin  IVPB 1000 milliGRAM(s) IV Intermittent every 24 hours    MEDICATIONS  (PRN):             ***** REVIEW OF SYSTEM:  GEN: no fever, no chills, no pain  RESP: no SOB, no cough, no sputum  CVS: no chest pain, no palpitations, no edema  GI: no abdominal pain, no nausea, no vomiting, no constipation, no diarrhea  : no dysuria, no frequency  NEURO: no headache, no dizziness  PSYCH: no depression, not anxious  Derm : no itching, no rash         ***** VITAL SIGNS:    T(F): 97.2 (10-24-18 @ 03:52), Max: 97.8 (10-23-18 @ 20:58)  HR: 72 (10-24-18 @ 06:11) (72 - 93)  BP: 133/83 (10-24-18 @ 03:52) (119/78 - 133/83)  RR: 20 (10-24-18 @ 06:11) (20 - 21)  SpO2: 97% (10-24-18 @ 06:11) (94% - 99%)  Wt(kg): --  ,   I&O's Summary    23 Oct 2018 07:01  -  24 Oct 2018 07:00  --------------------------------------------------------  IN: 1320 mL / OUT: 800 mL / NET: 520 mL                   *****PHYSICAL EXAM:  GEN: A&O X 3 , NAD , comfortable  HEENT: NCAT, EOMI, MMM, no icterus  NECK: Supple, No JVD  CVS: S1S2 , regular , No M/R/G appreciated  PULM: CTA B/L,  no W/R/R appreciated  ABD.: soft. non tender, non distended,  bowel sounds present  Extrem: intact pulses , no edema noted  Derm: No rash or ecchymosis noted  PSYCH: normal mood, no depression, not anxious         *****LAB AND IMAGIN.3   18.78 )-----------( 343      ( 23 Oct 2018 08:17 )             34.8               10-24    148<H>  |  108  |  38<H>  ----------------------------<  216<H>  3.8   |  26  |  0.73    Ca    9.3      24 Oct 2018 05:46      [All pertinent recent Imaging/Reports reviewed]  < from: Transthoracic Echocardiogram (10.17.18 @ 17:21) >  Conclusions:  1. Calcified trileaflet aortic valve with decreased  opening. Peak transaortic valve gradient equals 22 mm Hg,  mean transaortic valve gradient equals 11 mm Hg, estimated  aortic valve area equals 1.7 sqcm (by continuity equation),  aortic valve velocity time integral equals 35 cm,  consistent with mild aortic stenosis.  2. Increased relative wall thickness with normal left  ventricular mass index, consistent with concentric left  ventricular remodeling.  3. Endocardium not well visualized; grossly normal left  ventricular systolic function.  4. The right ventricle is not well visualized; grossly  normal right ventricular systolic function.  5. Estimated right ventricular systolic pressure equals 26  mm Hg, assuming right atrial pressure equals 8 mm Hg,  consistent with normal pulmonary pressures.  6. No mobile echodensities or vegetations are seen on the  aortic or mitral valve. The tricuspid and pulmonic valves  are not well visualized. Can not exclude endocarditis.  Consider MIKALA if clinically indicated.  *** Compared with echocardiogram of 2017, mild aortic  stenosis is now seen.    < end of copied text >         *****A S S E S S M E N T   A N D   P L A N :  83F with aspiration pneumonia  initial sinus tachycardia, HR improved  cont abx  keep O˜I  echo noted  dvt prophylaxis  f/u CT    	      __________________________  A. SIMÓN Sharp

## 2018-10-25 ENCOUNTER — TRANSCRIPTION ENCOUNTER (OUTPATIENT)
Age: 83
End: 2018-10-25

## 2018-10-25 LAB
ANION GAP SERPL CALC-SCNC: 13 MMOL/L — SIGNIFICANT CHANGE UP (ref 5–17)
BASE EXCESS BLDA CALC-SCNC: 2.4 MMOL/L — HIGH (ref -2–2)
BUN SERPL-MCNC: 33 MG/DL — HIGH (ref 7–23)
CALCIUM SERPL-MCNC: 9.3 MG/DL — SIGNIFICANT CHANGE UP (ref 8.4–10.5)
CHLORIDE SERPL-SCNC: 107 MMOL/L — SIGNIFICANT CHANGE UP (ref 96–108)
CO2 BLDA-SCNC: 28 MMOL/L — SIGNIFICANT CHANGE UP (ref 22–30)
CO2 SERPL-SCNC: 24 MMOL/L — SIGNIFICANT CHANGE UP (ref 22–31)
CREAT SERPL-MCNC: 0.71 MG/DL — SIGNIFICANT CHANGE UP (ref 0.5–1.3)
CULTURE RESULTS: SIGNIFICANT CHANGE UP
CULTURE RESULTS: SIGNIFICANT CHANGE UP
GAS PNL BLDA: SIGNIFICANT CHANGE UP
GLUCOSE BLDC GLUCOMTR-MCNC: 171 MG/DL — HIGH (ref 70–99)
GLUCOSE BLDC GLUCOMTR-MCNC: 172 MG/DL — HIGH (ref 70–99)
GLUCOSE BLDC GLUCOMTR-MCNC: 192 MG/DL — HIGH (ref 70–99)
GLUCOSE BLDC GLUCOMTR-MCNC: 197 MG/DL — HIGH (ref 70–99)
GLUCOSE BLDC GLUCOMTR-MCNC: 211 MG/DL — HIGH (ref 70–99)
GLUCOSE SERPL-MCNC: 254 MG/DL — HIGH (ref 70–99)
HCO3 BLDA-SCNC: 26 MMOL/L — SIGNIFICANT CHANGE UP (ref 21–29)
HCT VFR BLD CALC: 35 % — SIGNIFICANT CHANGE UP (ref 34.5–45)
HGB BLD-MCNC: 10.7 G/DL — LOW (ref 11.5–15.5)
HOROWITZ INDEX BLDA+IHG-RTO: 80 — SIGNIFICANT CHANGE UP
MCHC RBC-ENTMCNC: 30.6 GM/DL — LOW (ref 32–36)
MCHC RBC-ENTMCNC: 31.1 PG — SIGNIFICANT CHANGE UP (ref 27–34)
MCV RBC AUTO: 101.7 FL — HIGH (ref 80–100)
PCO2 BLDA: 40 MMHG — SIGNIFICANT CHANGE UP (ref 32–46)
PH BLDA: 7.44 — SIGNIFICANT CHANGE UP (ref 7.35–7.45)
PLATELET # BLD AUTO: 385 K/UL — SIGNIFICANT CHANGE UP (ref 150–400)
PO2 BLDA: 67 MMHG — LOW (ref 74–108)
POTASSIUM SERPL-MCNC: 4.1 MMOL/L — SIGNIFICANT CHANGE UP (ref 3.5–5.3)
POTASSIUM SERPL-SCNC: 4.1 MMOL/L — SIGNIFICANT CHANGE UP (ref 3.5–5.3)
RBC # BLD: 3.44 M/UL — LOW (ref 3.8–5.2)
RBC # FLD: 15.7 % — HIGH (ref 10.3–14.5)
SAO2 % BLDA: 92 % — SIGNIFICANT CHANGE UP (ref 92–96)
SODIUM SERPL-SCNC: 144 MMOL/L — SIGNIFICANT CHANGE UP (ref 135–145)
SPECIMEN SOURCE: SIGNIFICANT CHANGE UP
SPECIMEN SOURCE: SIGNIFICANT CHANGE UP
WBC # BLD: 12.58 K/UL — HIGH (ref 3.8–10.5)
WBC # FLD AUTO: 12.58 K/UL — HIGH (ref 3.8–10.5)

## 2018-10-25 RX ADMIN — Medication 5 MILLILITER(S): at 11:37

## 2018-10-25 RX ADMIN — Medication 50 MICROGRAM(S): at 05:27

## 2018-10-25 RX ADMIN — Medication 25 MILLIGRAM(S): at 05:27

## 2018-10-25 RX ADMIN — Medication 3 MILLILITER(S): at 05:27

## 2018-10-25 RX ADMIN — Medication 300 MILLIGRAM(S): at 11:37

## 2018-10-25 RX ADMIN — LEVETIRACETAM 750 MILLIGRAM(S): 250 TABLET, FILM COATED ORAL at 18:05

## 2018-10-25 RX ADMIN — HEPARIN SODIUM 5000 UNIT(S): 5000 INJECTION INTRAVENOUS; SUBCUTANEOUS at 18:05

## 2018-10-25 RX ADMIN — CEFEPIME 100 MILLIGRAM(S): 1 INJECTION, POWDER, FOR SOLUTION INTRAMUSCULAR; INTRAVENOUS at 05:22

## 2018-10-25 RX ADMIN — Medication 5 MILLILITER(S): at 23:00

## 2018-10-25 RX ADMIN — Medication 3 MILLILITER(S): at 18:06

## 2018-10-25 RX ADMIN — Medication 3 MILLILITER(S): at 11:36

## 2018-10-25 RX ADMIN — Medication 5 MILLILITER(S): at 05:28

## 2018-10-25 RX ADMIN — CHLORHEXIDINE GLUCONATE 1 APPLICATION(S): 213 SOLUTION TOPICAL at 08:15

## 2018-10-25 RX ADMIN — Medication 2: at 06:22

## 2018-10-25 RX ADMIN — PANTOPRAZOLE SODIUM 40 MILLIGRAM(S): 20 TABLET, DELAYED RELEASE ORAL at 11:37

## 2018-10-25 RX ADMIN — CITALOPRAM 40 MILLIGRAM(S): 10 TABLET, FILM COATED ORAL at 11:37

## 2018-10-25 RX ADMIN — Medication 25 MILLIGRAM(S): at 18:05

## 2018-10-25 RX ADMIN — AMLODIPINE BESYLATE 10 MILLIGRAM(S): 2.5 TABLET ORAL at 05:27

## 2018-10-25 RX ADMIN — Medication 5 MILLILITER(S): at 18:03

## 2018-10-25 RX ADMIN — CEFEPIME 100 MILLIGRAM(S): 1 INJECTION, POWDER, FOR SOLUTION INTRAMUSCULAR; INTRAVENOUS at 18:19

## 2018-10-25 RX ADMIN — Medication 3 MILLILITER(S): at 23:00

## 2018-10-25 RX ADMIN — LEVETIRACETAM 750 MILLIGRAM(S): 250 TABLET, FILM COATED ORAL at 05:27

## 2018-10-25 RX ADMIN — HEPARIN SODIUM 5000 UNIT(S): 5000 INJECTION INTRAVENOUS; SUBCUTANEOUS at 05:27

## 2018-10-25 RX ADMIN — Medication 250 MILLIGRAM(S): at 06:22

## 2018-10-25 NOTE — PROGRESS NOTE ADULT - SUBJECTIVE AND OBJECTIVE BOX
Follow-up Pulm Progress Note    No new respiratory events overnight.  Denies SOB/CP.   Awake and alert, no complaints  94% on Hi Manny 50L/min 90%    Medications:  MEDICATIONS  (STANDING):  ALBUTerol/ipratropium for Nebulization 3 milliLiter(s) Nebulizer every 6 hours  allopurinol 300 milliGRAM(s) Oral daily  amLODIPine   Tablet 10 milliGRAM(s) Oral daily  Biotene Dry Mouth Oral Rinse 5 milliLiter(s) Swish and Spit four times a day  cefepime   IVPB      cefepime   IVPB 1000 milliGRAM(s) IV Intermittent every 12 hours  chlorhexidine 4% Liquid 1 Application(s) Topical <User Schedule>  citalopram 40 milliGRAM(s) Oral daily  heparin  Injectable 5000 Unit(s) SubCutaneous every 12 hours  insulin lispro (HumaLOG) corrective regimen sliding scale   SubCutaneous every 6 hours  levETIRAcetam  Solution 750 milliGRAM(s) Oral two times a day  levothyroxine 50 MICROGram(s) Oral daily  metoprolol tartrate 25 milliGRAM(s) Oral two times a day  pantoprazole   Suspension 40 milliGRAM(s) Oral daily  tiotropium 18 MICROgram(s) Capsule 1 Capsule(s) Inhalation daily  vancomycin  IVPB 1000 milliGRAM(s) IV Intermittent every 24 hours    MEDICATIONS  (PRN):          Vital Signs Last 24 Hrs  T(C): 36.7 (25 Oct 2018 11:27), Max: 36.7 (25 Oct 2018 11:27)  T(F): 98 (25 Oct 2018 11:27), Max: 98 (25 Oct 2018 11:27)  HR: 91 (25 Oct 2018 11:27) (70 - 100)  BP: 141/80 (25 Oct 2018 11:27) (123/73 - 142/87)  BP(mean): --  RR: 18 (25 Oct 2018 11:27) (17 - 20)  SpO2: 95% (25 Oct 2018 11:27) (95% - 99%) on 90%    ABG - ( 25 Oct 2018 07:02 )  pH, Arterial: 7.44  pH, Blood: x     /  pCO2: 40    /  pO2: 67    / HCO3: 26    / Base Excess: 2.4   /  SaO2: 92                    10-24 @ 07:01  -  10-25 @ 07:00  --------------------------------------------------------  IN: 200 mL / OUT: 801 mL / NET: -601 mL          LABS:                        10.7   12.58 )-----------( 385      ( 25 Oct 2018 07:42 )             35.0     10-25    144  |  107  |  33<H>  ----------------------------<  254<H>  4.1   |  24  |  0.71    Ca    9.3      25 Oct 2018 07:01            CAPILLARY BLOOD GLUCOSE      POCT Blood Glucose.: 172 mg/dL (25 Oct 2018 11:29)                        CULTURES: (if applicable)  Culture Results:   No growth to date. (10-20 @ 21:41)  Culture Results:   No growth to date. (10-20 @ 14:48)    Most recent blood culture -- 10-20 @ 21:41   -- -- .Blood Blood-Peripheral 10-20 @ 21:41  Most recent blood culture -- 10-20 @ 14:48   -- -- .Blood Blood-Venous 10-20 @ 14:48        Physical Examination:  PULM: Decreased BS at bases  CVS: S1, S2 heard    RADIOLOGY REVIEWED  CTA chest: No PE  Bibasilar atelectasis  RUL GGO improving Follow-up Pulm Progress Note    No new respiratory events overnight.  Denies SOB/CP.   Awake and alert, no complaints  94% on Hi Manny 50L/min 90%    Medications:  MEDICATIONS  (STANDING):  ALBUTerol/ipratropium for Nebulization 3 milliLiter(s) Nebulizer every 6 hours  allopurinol 300 milliGRAM(s) Oral daily  amLODIPine   Tablet 10 milliGRAM(s) Oral daily  Biotene Dry Mouth Oral Rinse 5 milliLiter(s) Swish and Spit four times a day  cefepime   IVPB      cefepime   IVPB 1000 milliGRAM(s) IV Intermittent every 12 hours  chlorhexidine 4% Liquid 1 Application(s) Topical <User Schedule>  citalopram 40 milliGRAM(s) Oral daily  heparin  Injectable 5000 Unit(s) SubCutaneous every 12 hours  insulin lispro (HumaLOG) corrective regimen sliding scale   SubCutaneous every 6 hours  levETIRAcetam  Solution 750 milliGRAM(s) Oral two times a day  levothyroxine 50 MICROGram(s) Oral daily  metoprolol tartrate 25 milliGRAM(s) Oral two times a day  pantoprazole   Suspension 40 milliGRAM(s) Oral daily  tiotropium 18 MICROgram(s) Capsule 1 Capsule(s) Inhalation daily  vancomycin  IVPB 1000 milliGRAM(s) IV Intermittent every 24 hours    MEDICATIONS  (PRN):    Vital Signs Last 24 Hrs  T(C): 36.7 (25 Oct 2018 11:27), Max: 36.7 (25 Oct 2018 11:27)  T(F): 98 (25 Oct 2018 11:27), Max: 98 (25 Oct 2018 11:27)  HR: 91 (25 Oct 2018 11:27) (70 - 100)  BP: 141/80 (25 Oct 2018 11:27) (123/73 - 142/87)  BP(mean): --  RR: 18 (25 Oct 2018 11:27) (17 - 20)  SpO2: 95% (25 Oct 2018 11:27) (95% - 99%) on 90%    ABG - ( 25 Oct 2018 07:02 )  pH, Arterial: 7.44  pH, Blood: x     /  pCO2: 40    /  pO2: 67    / HCO3: 26    / Base Excess: 2.4   /  SaO2: 92        10-24 @ 07:01  -  10-25 @ 07:00  --------------------------------------------------------  IN: 200 mL / OUT: 801 mL / NET: -601 mL      LABS:                        10.7   12.58 )-----------( 385      ( 25 Oct 2018 07:42 )             35.0     10-25    144  |  107  |  33<H>  ----------------------------<  254<H>  4.1   |  24  |  0.71    Ca    9.3      25 Oct 2018 07:01    CAPILLARY BLOOD GLUCOSE      POCT Blood Glucose.: 172 mg/dL (25 Oct 2018 11:29)      CULTURES: (if applicable)  Culture Results:   No growth to date. (10-20 @ 21:41)  Culture Results:   No growth to date. (10-20 @ 14:48)    Most recent blood culture -- 10-20 @ 21:41   -- -- .Blood Blood-Peripheral 10-20 @ 21:41  Most recent blood culture -- 10-20 @ 14:48   -- -- .Blood Blood-Venous 10-20 @ 14:48        Physical Examination:  PULM: Decreased BS at bases  CVS: S1, S2 heard    RADIOLOGY REVIEWED  CTA chest: No PE  Bibasilar atelectasis  RUL GGO improving

## 2018-10-25 NOTE — DISCHARGE NOTE ADULT - OTHER SIGNIFICANT FINDINGS
10/24/18: Incidental Finding on CT Angiogram of chest: " Indeterminate right breast nodule, measuring   1.3 cm. Sebaceous cyst in the left breast." Follow up with your primary physician 10/24/18: Incidental Finding on CT Angiogram of chest: " Indeterminate right breast nodule, measuring   1.3 cm. Sebaceous cyst in the left breast." Follow up with your primary physician to have dedicated breast imagining done

## 2018-10-25 NOTE — PROGRESS NOTE ADULT - PROBLEM SELECTOR PLAN 1
Suspect ongoing hypoxia r/t shunt through bibasilar atelectasis. Almost complete compression of RLL   -Patient is debilitated, unable to get OOB 2nd LLE contraction. Start on Bipap 15/8 80% qHS to try and blow open lower lobes  -Clinically remains non-toxic appearing   -CTPA negative for PE  -GGO in RUL appears improved from prior CT  -Abx broadened to Vanco/Cefepime/Azithromycin, length per ID  -TTE is grossly normal

## 2018-10-25 NOTE — PROGRESS NOTE ADULT - SUBJECTIVE AND OBJECTIVE BOX
- Patient seen and examined.  - In summary, patient is a 83 year old woman who presented with sob (22 Oct 2018 07:19)  - Today, patient is without complaints.         *****MEDICATIONS:    MEDICATIONS  (STANDING):  ALBUTerol/ipratropium for Nebulization 3 milliLiter(s) Nebulizer every 6 hours  allopurinol 300 milliGRAM(s) Oral daily  amLODIPine   Tablet 10 milliGRAM(s) Oral daily  Biotene Dry Mouth Oral Rinse 5 milliLiter(s) Swish and Spit four times a day  cefepime   IVPB      cefepime   IVPB 1000 milliGRAM(s) IV Intermittent every 12 hours  chlorhexidine 4% Liquid 1 Application(s) Topical <User Schedule>  citalopram 40 milliGRAM(s) Oral daily  heparin  Injectable 5000 Unit(s) SubCutaneous every 12 hours  insulin lispro (HumaLOG) corrective regimen sliding scale   SubCutaneous every 6 hours  levETIRAcetam  Solution 750 milliGRAM(s) Oral two times a day  levothyroxine 50 MICROGram(s) Oral daily  metoprolol tartrate 25 milliGRAM(s) Oral two times a day  pantoprazole   Suspension 40 milliGRAM(s) Oral daily  tiotropium 18 MICROgram(s) Capsule 1 Capsule(s) Inhalation daily  vancomycin  IVPB 1000 milliGRAM(s) IV Intermittent every 24 hours    MEDICATIONS  (PRN):               ***** REVIEW OF SYSTEM:  GEN: no fever, no chills, no pain  RESP: no SOB, no cough, no sputum  CVS: no chest pain, no palpitations, no edema  GI: no abdominal pain, no nausea, no vomiting, no constipation, no diarrhea  : no dysuria, no frequency  NEURO: no headache, no dizziness  PSYCH: no depression, not anxious  Derm : no itching, no rash         ***** VITAL SIGNS:    T(F): 97.5 (10-25-18 @ 04:10), Max: 97.6 (10-24-18 @ 16:45)  HR: 90 (10-25-18 @ 07:20) (70 - 100)  BP: 123/73 (10-25-18 @ 04:10) (123/73 - 144/83)  RR: 18 (10-25-18 @ 07:20) (17 - 22)  SpO2: 98% (10-25-18 @ 07:20) (95% - 99%)  Wt(kg): --  ,   I&O's Summary    24 Oct 2018 07:01  -  25 Oct 2018 07:00  --------------------------------------------------------  IN: 200 mL / OUT: 801 mL / NET: -601 mL                 *****PHYSICAL EXAM:  GEN: A&O X 3 , NAD , comfortable  HEENT: NCAT, EOMI, MMM, no icterus  NECK: Supple, No JVD  CVS: S1S2 , regular , No M/R/G appreciated  PULM: CTA B/L,  no W/R/R appreciated  ABD.: soft. non tender, non distended,  bowel sounds present  Extrem: intact pulses , no edema noted  Derm: No rash or ecchymosis noted  PSYCH: normal mood, no depression, not anxious         *****LAB AND IMAGING:                                            10.7   12.58 )-----------( 385      ( 25 Oct 2018 07:42 )             35.0               10-25    144  |  107  |  33<H>  ----------------------------<  254<H>  4.1   |  24  |  0.71    Ca    9.3      25 Oct 2018 07:01      [All pertinent recent Imaging/Reports reviewed]  < from: Transthoracic Echocardiogram (10.17.18 @ 17:21) >  Conclusions:  1. Calcified trileaflet aortic valve with decreased  opening. Peak transaortic valve gradient equals 22 mm Hg,  mean transaortic valve gradient equals 11 mm Hg, estimated  aortic valve area equals 1.7 sqcm (by continuity equation),  aortic valve velocity time integral equals 35 cm,  consistent with mild aortic stenosis.  2. Increased relative wall thickness with normal left  ventricular mass index, consistent with concentric left  ventricular remodeling.  3. Endocardium not well visualized; grossly normal left  ventricular systolic function.  4. The right ventricle is not well visualized; grossly  normal right ventricular systolic function.  5. Estimated right ventricular systolic pressure equals 26  mm Hg, assuming right atrial pressure equals 8 mm Hg,  consistent with normal pulmonary pressures.  6. No mobile echodensities or vegetations are seen on the  aortic or mitral valve. The tricuspid and pulmonic valves  are not well visualized. Can not exclude endocarditis.  Consider MIKALA if clinically indicated.  *** Compared with echocardiogram of 7/5/2017, mild aortic  stenosis is now seen.    < end of copied text >         *****A S S E S S M E N T   A N D   P L A N :  83F with aspiration pneumonia  initial sinus tachycardia, HR improved  cont abx  keep O˜I  echo noted  dvt prophylaxis      __________________________  A. SIMÓN Sharp

## 2018-10-25 NOTE — DISCHARGE NOTE ADULT - HOSPITAL COURSE
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 CT chest with multifocal PNA. She had RRT on 10/20 for hypoxic respiratory distress, with high fi02 requirements.       Problem: Acute respiratory failure with hypoxia.  Plan: Suspect ongoing hypoxia r/t shunt through bibasilar atelectasis. Almost complete compression of RLL   -Patient is debilitated, unable to get OOB 2nd LLE contraction. cont on Bipap 15/8 80% qHS to try and blow open lower lobes  -Clinically remains non-toxic appearing   -CTPA negative for PE  -GGO in RUL appears improved from prior CT  -Abx broadened to Vanco/Cefepime/Azithromycin, length per ID cleared for DC   -TTE is grossly normal  -Wean Hi Manny as tolerated  DCP with med rec discussed with Dr Segundo Pt medically cleared for dc to rehab   follow up with PMD after rehab

## 2018-10-25 NOTE — DISCHARGE NOTE ADULT - CARE PROVIDER_API CALL
Jaylen Leach (DO), Critical Care Medicine; Internal Medicine; Pulmonary Disease  [address] [address]  Phone: [phone]  Fax: [phone]

## 2018-10-25 NOTE — PROGRESS NOTE ADULT - SUBJECTIVE AND OBJECTIVE BOX
CC: f/u for multifocal pneumonia, respiratory failure    Patient reports she feels a bit better    REVIEW OF SYSTEMS:  All other review of systems negative (Comprehensive ROS)    Antimicrobials Day #  :7/10  cefepime   IVPB      cefepime   IVPB 1000 milliGRAM(s) IV Intermittent every 12 hours  vancomycin  IVPB 1000 milliGRAM(s) IV Intermittent every 24 hours    Other Medications Reviewed    T(F): 98 (10-25-18 @ 11:27), Max: 98 (10-25-18 @ 11:27)  HR: 91 (10-25-18 @ 11:27)  BP: 141/80 (10-25-18 @ 11:27)  RR: 18 (10-25-18 @ 11:27)  SpO2: 95% (10-25-18 @ 11:27)  Wt(kg): --    PHYSICAL EXAM:  General: alert, no acute distress on high flow  Eyes:  anicteric, no conjunctival injection, no discharge  Oropharynx: no lesions or injection 	  Neck: supple, without adenopathy  Lungs: distant bs to auscultation  Heart: regular rate and rhythm; no murmur, rubs or gallops  Abdomen: soft, nondistended, nontender, without mass or organomegaly  Skin: no lesions  Extremities: no clubbing, cyanosis, . legs with edema  Neurologic: alert, oriented, moves right  extremities. left leg contracted    LAB RESULTS:                        10.7   12.58 )-----------( 385      ( 25 Oct 2018 07:42 )             35.0     10-25    144  |  107  |  33<H>  ----------------------------<  254<H>  4.1   |  24  |  0.71    Ca    9.3      25 Oct 2018 07:01          MICROBIOLOGY:  RECENT CULTURES:  10-20 @ 21:41 .Blood Blood-Peripheral     No growth to date.          RADIOLOGY REVIEWED:  < from: CT Angio Chest w/ IV Cont (10.24.18 @ 17:08) >  IMPRESSION:   No pulmonary embolism.    Multifocal pneumonia.    Indeterminate right breast nodule, measuring 1.3 cm, for which further   evaluation with dedicated breast imaging is recommended.      < end of copied text >      Assessment:  Patient with cva, dysphagia admitted with severe multifocal hcap slowly improved on vanco and cefepime and finished zithromax. Had pseudobacteremia   Plan: complete 3 more days of vanco and cefepime  pulmonary toilet

## 2018-10-25 NOTE — DISCHARGE NOTE ADULT - MEDICATION SUMMARY - MEDICATIONS TO TAKE
I will START or STAY ON the medications listed below when I get home from the hospital:    allopurinol 300 mg oral tablet  -- 1 tab(s) by gastrostomy tube once a day   -- Indication: For Gout     metoprolol tartrate 25 mg oral tablet  -- 1 tab(s) by gastrostomy tube 2 times a day   -- Indication: For HTN (hypertension)    ipratropium-albuterol 0.5 mg-2.5 mg/3 mLinhalation solution  -- 3 milliliter(s) inhaled every 6 hours  -- Indication: For Acute respiratory failure with hypoxia    amLODIPine 10 mg oral tablet  -- 1 tab(s) by gastrostomy tube once a day   -- Indication: For HTN (hypertension)    pantoprazole 40 mg oral granule, delayed release  -- 1  by gastrostomy tube once a day   -- Indication: For Dysphagia    levothyroxine 50 mcg (0.05 mg) oral tablet  -- 1 tab(s) by gastrostomy tube once a day   -- Indication: For Thyroid

## 2018-10-25 NOTE — DISCHARGE NOTE ADULT - PATIENT PORTAL LINK FT
You can access the [facility] Patient Portal, offered by [facility], by registering with the following website: [email or web address]

## 2018-10-25 NOTE — PROGRESS NOTE ADULT - SUBJECTIVE AND OBJECTIVE BOX
less  sob    REVIEW OF SYSTEMS:  GEN: no fever,    no chills  RESP:  SOB,   no cough  CVS: no chest pain,   no palpitations  GI: no abdominal pain,   no nausea,   no vomiting,   no constipation,   no diarrhea  : no dysuria,   no frequency  NEURO: no headache,   no dizziness  PSYCH: no depression,   not anxious  Derm : no rash    MEDICATIONS  (STANDING):  ALBUTerol/ipratropium for Nebulization 3 milliLiter(s) Nebulizer every 6 hours  allopurinol 300 milliGRAM(s) Oral daily  amLODIPine   Tablet 10 milliGRAM(s) Oral daily  Biotene Dry Mouth Oral Rinse 5 milliLiter(s) Swish and Spit four times a day  cefepime   IVPB      cefepime   IVPB 1000 milliGRAM(s) IV Intermittent every 12 hours  chlorhexidine 4% Liquid 1 Application(s) Topical <User Schedule>  citalopram 40 milliGRAM(s) Oral daily  heparin  Injectable 5000 Unit(s) SubCutaneous every 12 hours  insulin lispro (HumaLOG) corrective regimen sliding scale   SubCutaneous every 6 hours  levETIRAcetam  Solution 750 milliGRAM(s) Oral two times a day  levothyroxine 50 MICROGram(s) Oral daily  metoprolol tartrate 25 milliGRAM(s) Oral two times a day  pantoprazole   Suspension 40 milliGRAM(s) Oral daily  tiotropium 18 MICROgram(s) Capsule 1 Capsule(s) Inhalation daily  vancomycin  IVPB 1000 milliGRAM(s) IV Intermittent every 24 hours    MEDICATIONS  (PRN):      Vital Signs Last 24 Hrs  T(C): 36.4 (25 Oct 2018 04:10), Max: 36.4 (24 Oct 2018 12:08)  T(F): 97.5 (25 Oct 2018 04:10), Max: 97.6 (24 Oct 2018 16:45)  HR: 91 (25 Oct 2018 04:10) (70 - 100)  BP: 123/73 (25 Oct 2018 04:10) (123/73 - 144/83)  BP(mean): --  RR: 18 (25 Oct 2018 04:10) (17 - 22)  SpO2: 96% (25 Oct 2018 04:10) (95% - 99%)  CAPILLARY BLOOD GLUCOSE      POCT Blood Glucose.: 211 mg/dL (25 Oct 2018 06:03)  POCT Blood Glucose.: 171 mg/dL (25 Oct 2018 00:44)  POCT Blood Glucose.: 185 mg/dL (24 Oct 2018 18:30)  POCT Blood Glucose.: 109 mg/dL (24 Oct 2018 12:09)  POCT Blood Glucose.: 223 mg/dL (24 Oct 2018 07:47)    I&O's Summary    24 Oct 2018 07:01  -  25 Oct 2018 07:00  --------------------------------------------------------  IN: 200 mL / OUT: 801 mL / NET: -601 mL        PHYSICAL EXAM:  HEAD:  Atraumatic, Normocephalic  NECK: Supple, No   JVD  CHEST/LUNG:   no     rales,     no,    rhonchi  HEART: Regular rate and rhythm;         murmur  ABDOMEN: Soft, Nontender, ;   EXTREMITIES:    no    edema  NEUROLOGY:  alert    LABS:                        10.7   13.71 )-----------( 357      ( 24 Oct 2018 09:14 )             33.8     10-24    148<H>  |  108  |  38<H>  ----------------------------<  216<H>  3.8   |  26  |  0.73    Ca    9.3      24 Oct 2018 05:46                ABG - ( 25 Oct 2018 07:02 )  pH, Arterial: 7.44  pH, Blood: x     /  pCO2: 40    /  pO2: 67    / HCO3: 26    / Base Excess: 2.4   /  SaO2: 92                  Hemoglobin A1C, Whole Blood: 5.6 % (10-01 @ 07:40)    Thyroid Stimulating Hormone, Serum: 1.30 uIU/mL (10-01 @ 07:40)          Consultant(s) Notes Reviewed:      Care Discussed with Consultants/Other Providers:

## 2018-10-25 NOTE — DISCHARGE NOTE ADULT - PLAN OF CARE
Free from resp distress Take meds as directed   follow up with PMD Pneumonia is a lung infection that can cause a fever, cough, and trouble breathing.  Continue all antibiotics as ordered until complete.  Nutrition is important, eat small frequent meals.  Get lots of rest and drink fluids.  Call your health care provider upon arrival home from hospital and make a follow up appointment for one week.  If your cough worsens, you develop fever greater than 101', you have shaking chills, a fast heartbeat, trouble breathing and/or feel your are breathing much faster than usual, call your healthcare provider.  Make sure you wash your hands frequently. Follow up with your medical doctor to establish long term blood pressure treatment goals.

## 2018-10-25 NOTE — PROGRESS NOTE ADULT - ASSESSMENT
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 CT chest with multifocal PNA. She had RRT on 10/20 for hypoxic respiratory distress, with high fi02 requirements.

## 2018-10-25 NOTE — DISCHARGE NOTE ADULT - CARE PLAN
Principal Discharge DX:	Acute respiratory failure with hypoxia  Secondary Diagnosis:	Aspiration pneumonia  Secondary Diagnosis:	HTN (hypertension) Principal Discharge DX:	Acute respiratory failure with hypoxia  Goal:	Free from resp distress  Assessment and plan of treatment:	Take meds as directed   follow up with PMD  Secondary Diagnosis:	Aspiration pneumonia  Assessment and plan of treatment:	Pneumonia is a lung infection that can cause a fever, cough, and trouble breathing.  Continue all antibiotics as ordered until complete.  Nutrition is important, eat small frequent meals.  Get lots of rest and drink fluids.  Call your health care provider upon arrival home from hospital and make a follow up appointment for one week.  If your cough worsens, you develop fever greater than 101', you have shaking chills, a fast heartbeat, trouble breathing and/or feel your are breathing much faster than usual, call your healthcare provider.  Make sure you wash your hands frequently.  Secondary Diagnosis:	HTN (hypertension)  Assessment and plan of treatment:	Follow up with your medical doctor to establish long term blood pressure treatment goals.

## 2018-10-26 LAB
ANION GAP SERPL CALC-SCNC: 13 MMOL/L — SIGNIFICANT CHANGE UP (ref 5–17)
BASE EXCESS BLDA CALC-SCNC: 4 MMOL/L — HIGH (ref -2–2)
BUN SERPL-MCNC: 30 MG/DL — HIGH (ref 7–23)
CALCIUM SERPL-MCNC: 9.3 MG/DL — SIGNIFICANT CHANGE UP (ref 8.4–10.5)
CHLORIDE SERPL-SCNC: 106 MMOL/L — SIGNIFICANT CHANGE UP (ref 96–108)
CO2 BLDA-SCNC: 29 MMOL/L — SIGNIFICANT CHANGE UP (ref 22–30)
CO2 SERPL-SCNC: 24 MMOL/L — SIGNIFICANT CHANGE UP (ref 22–31)
CREAT SERPL-MCNC: 0.73 MG/DL — SIGNIFICANT CHANGE UP (ref 0.5–1.3)
GAS PNL BLDA: SIGNIFICANT CHANGE UP
GLUCOSE BLDC GLUCOMTR-MCNC: 107 MG/DL — HIGH (ref 70–99)
GLUCOSE BLDC GLUCOMTR-MCNC: 177 MG/DL — HIGH (ref 70–99)
GLUCOSE BLDC GLUCOMTR-MCNC: 188 MG/DL — HIGH (ref 70–99)
GLUCOSE BLDC GLUCOMTR-MCNC: 259 MG/DL — HIGH (ref 70–99)
GLUCOSE SERPL-MCNC: 156 MG/DL — HIGH (ref 70–99)
HCO3 BLDA-SCNC: 28 MMOL/L — SIGNIFICANT CHANGE UP (ref 21–29)
HCT VFR BLD CALC: 35.5 % — SIGNIFICANT CHANGE UP (ref 34.5–45)
HGB BLD-MCNC: 11 G/DL — LOW (ref 11.5–15.5)
MCHC RBC-ENTMCNC: 31.1 GM/DL — LOW (ref 32–36)
MCHC RBC-ENTMCNC: 31.4 PG — SIGNIFICANT CHANGE UP (ref 27–34)
MCV RBC AUTO: 101 FL — HIGH (ref 80–100)
PCO2 BLDA: 38 MMHG — SIGNIFICANT CHANGE UP (ref 32–46)
PH BLDA: 7.47 — HIGH (ref 7.35–7.45)
PLATELET # BLD AUTO: 334 K/UL — SIGNIFICANT CHANGE UP (ref 150–400)
PO2 BLDA: 67 MMHG — LOW (ref 74–108)
POTASSIUM SERPL-MCNC: 4.1 MMOL/L — SIGNIFICANT CHANGE UP (ref 3.5–5.3)
POTASSIUM SERPL-SCNC: 4.1 MMOL/L — SIGNIFICANT CHANGE UP (ref 3.5–5.3)
RBC # BLD: 3.52 M/UL — LOW (ref 3.8–5.2)
RBC # FLD: 14.4 % — SIGNIFICANT CHANGE UP (ref 10.3–14.5)
SAO2 % BLDA: 93 % — SIGNIFICANT CHANGE UP (ref 92–96)
SODIUM SERPL-SCNC: 143 MMOL/L — SIGNIFICANT CHANGE UP (ref 135–145)
WBC # BLD: 13 K/UL — HIGH (ref 3.8–10.5)
WBC # FLD AUTO: 13 K/UL — HIGH (ref 3.8–10.5)

## 2018-10-26 RX ORDER — INSULIN GLARGINE 100 [IU]/ML
10 INJECTION, SOLUTION SUBCUTANEOUS AT BEDTIME
Qty: 0 | Refills: 0 | Status: DISCONTINUED | OUTPATIENT
Start: 2018-10-26 | End: 2018-10-28

## 2018-10-26 RX ORDER — ONDANSETRON 8 MG/1
4 TABLET, FILM COATED ORAL ONCE
Qty: 0 | Refills: 0 | Status: COMPLETED | OUTPATIENT
Start: 2018-10-26 | End: 2018-10-26

## 2018-10-26 RX ORDER — ACETAMINOPHEN 500 MG
1000 TABLET ORAL ONCE
Qty: 0 | Refills: 0 | Status: COMPLETED | OUTPATIENT
Start: 2018-10-26 | End: 2018-10-26

## 2018-10-26 RX ADMIN — Medication 5 MILLILITER(S): at 12:55

## 2018-10-26 RX ADMIN — LEVETIRACETAM 750 MILLIGRAM(S): 250 TABLET, FILM COATED ORAL at 05:15

## 2018-10-26 RX ADMIN — Medication 3: at 05:52

## 2018-10-26 RX ADMIN — Medication 400 MILLIGRAM(S): at 09:14

## 2018-10-26 RX ADMIN — CEFEPIME 100 MILLIGRAM(S): 1 INJECTION, POWDER, FOR SOLUTION INTRAMUSCULAR; INTRAVENOUS at 20:28

## 2018-10-26 RX ADMIN — CEFEPIME 100 MILLIGRAM(S): 1 INJECTION, POWDER, FOR SOLUTION INTRAMUSCULAR; INTRAVENOUS at 05:17

## 2018-10-26 RX ADMIN — AMLODIPINE BESYLATE 10 MILLIGRAM(S): 2.5 TABLET ORAL at 05:16

## 2018-10-26 RX ADMIN — Medication 50 MICROGRAM(S): at 05:16

## 2018-10-26 RX ADMIN — Medication 25 MILLIGRAM(S): at 05:16

## 2018-10-26 RX ADMIN — Medication 3 MILLILITER(S): at 12:54

## 2018-10-26 RX ADMIN — PANTOPRAZOLE SODIUM 40 MILLIGRAM(S): 20 TABLET, DELAYED RELEASE ORAL at 12:54

## 2018-10-26 RX ADMIN — Medication 3 MILLILITER(S): at 05:15

## 2018-10-26 RX ADMIN — ONDANSETRON 4 MILLIGRAM(S): 8 TABLET, FILM COATED ORAL at 02:35

## 2018-10-26 RX ADMIN — HEPARIN SODIUM 5000 UNIT(S): 5000 INJECTION INTRAVENOUS; SUBCUTANEOUS at 18:53

## 2018-10-26 RX ADMIN — HEPARIN SODIUM 5000 UNIT(S): 5000 INJECTION INTRAVENOUS; SUBCUTANEOUS at 05:16

## 2018-10-26 RX ADMIN — Medication 300 MILLIGRAM(S): at 12:54

## 2018-10-26 RX ADMIN — Medication 3 MILLILITER(S): at 18:51

## 2018-10-26 RX ADMIN — CITALOPRAM 40 MILLIGRAM(S): 10 TABLET, FILM COATED ORAL at 12:55

## 2018-10-26 RX ADMIN — Medication 1000 MILLIGRAM(S): at 10:12

## 2018-10-26 RX ADMIN — CHLORHEXIDINE GLUCONATE 1 APPLICATION(S): 213 SOLUTION TOPICAL at 11:30

## 2018-10-26 RX ADMIN — LEVETIRACETAM 750 MILLIGRAM(S): 250 TABLET, FILM COATED ORAL at 18:52

## 2018-10-26 RX ADMIN — Medication 5 MILLILITER(S): at 05:17

## 2018-10-26 RX ADMIN — Medication 250 MILLIGRAM(S): at 10:33

## 2018-10-26 RX ADMIN — Medication 5 MILLILITER(S): at 18:52

## 2018-10-26 RX ADMIN — Medication 25 MILLIGRAM(S): at 18:52

## 2018-10-26 NOTE — PROGRESS NOTE ADULT - SUBJECTIVE AND OBJECTIVE BOX
bedbound/ on oxygen, less  sob  REVIEW OF SYSTEMS:  GEN: no fever,    no chills  RESP: no SOB,   no cough  CVS: no chest pain,   no palpitations  GI: no abdominal pain,   no nausea,   no vomiting,   no constipation,   no diarrhea  : no dysuria,   no frequency  NEURO: no headache,   no dizziness  PSYCH: no depression,   not anxious  Derm : no rash    MEDICATIONS  (STANDING):  acetaminophen  IVPB .. 1000 milliGRAM(s) IV Intermittent once  ALBUTerol/ipratropium for Nebulization 3 milliLiter(s) Nebulizer every 6 hours  allopurinol 300 milliGRAM(s) Oral daily  amLODIPine   Tablet 10 milliGRAM(s) Oral daily  Biotene Dry Mouth Oral Rinse 5 milliLiter(s) Swish and Spit four times a day  cefepime   IVPB      cefepime   IVPB 1000 milliGRAM(s) IV Intermittent every 12 hours  chlorhexidine 4% Liquid 1 Application(s) Topical <User Schedule>  citalopram 40 milliGRAM(s) Oral daily  heparin  Injectable 5000 Unit(s) SubCutaneous every 12 hours  insulin lispro (HumaLOG) corrective regimen sliding scale   SubCutaneous every 6 hours  levETIRAcetam  Solution 750 milliGRAM(s) Oral two times a day  levothyroxine 50 MICROGram(s) Oral daily  metoprolol tartrate 25 milliGRAM(s) Oral two times a day  pantoprazole   Suspension 40 milliGRAM(s) Oral daily  tiotropium 18 MICROgram(s) Capsule 1 Capsule(s) Inhalation daily  vancomycin  IVPB 1000 milliGRAM(s) IV Intermittent every 24 hours    MEDICATIONS  (PRN):      Vital Signs Last 24 Hrs  T(C): 36.7 (26 Oct 2018 04:05), Max: 36.7 (25 Oct 2018 11:27)  T(F): 98 (26 Oct 2018 04:05), Max: 98 (25 Oct 2018 11:27)  HR: 97 (26 Oct 2018 04:05) (75 - 97)  BP: 135/80 (26 Oct 2018 04:05) (124/76 - 141/80)  BP(mean): --  RR: 20 (26 Oct 2018 04:05) (18 - 23)  SpO2: 100% (26 Oct 2018 04:05) (95% - 100%)  CAPILLARY BLOOD GLUCOSE      POCT Blood Glucose.: 259 mg/dL (26 Oct 2018 05:49)  POCT Blood Glucose.: 188 mg/dL (26 Oct 2018 00:23)  POCT Blood Glucose.: 197 mg/dL (25 Oct 2018 18:02)  POCT Blood Glucose.: 172 mg/dL (25 Oct 2018 11:29)  POCT Blood Glucose.: 192 mg/dL (25 Oct 2018 08:13)    I&O's Summary    25 Oct 2018 07:01  -  26 Oct 2018 07:00  --------------------------------------------------------  IN: 545 mL / OUT: 400 mL / NET: 145 mL        PHYSICAL EXAM:  HEAD:  Atraumatic, Normocephalic  NECK: Supple, No   JVD  CHEST/LUNG:   no     rales,     no,    rhonchi  HEART: Regular rate and rhythm;         murmur  ABDOMEN: Soft, Nontender, ;   EXTREMITIES:   no     edema/ left weakness  NEUROLOGY:  alert  breast, large, no mass  palpated    LABS:                        10.7   12.58 )-----------( 385      ( 25 Oct 2018 07:42 )             35.0     10-25    144  |  107  |  33<H>  ----------------------------<  254<H>  4.1   |  24  |  0.71    Ca    9.3      25 Oct 2018 07:01                ABG - ( 25 Oct 2018 07:02 )  pH, Arterial: 7.44  pH, Blood: x     /  pCO2: 40    /  pO2: 67    / HCO3: 26    / Base Excess: 2.4   /  SaO2: 92                  Hemoglobin A1C, Whole Blood: 5.6 % (10-01 @ 07:40)    Thyroid Stimulating Hormone, Serum: 1.30 uIU/mL (10-01 @ 07:40)          Consultant(s) Notes Reviewed:      Care Discussed with Consultants/Other Providers:

## 2018-10-26 NOTE — PROGRESS NOTE ADULT - SUBJECTIVE AND OBJECTIVE BOX
CC: f/u for pneumonia    Patient reports feels a bit better    REVIEW OF SYSTEMS:  All other review of systems negative (Comprehensive ROS)    Antimicrobials Day #  :8/10  cefepime   IVPB      cefepime   IVPB 1000 milliGRAM(s) IV Intermittent every 12 hours  vancomycin  IVPB 1000 milliGRAM(s) IV Intermittent every 24 hours    Other Medications Reviewed    T(F): 97.7 (10-26-18 @ 11:27), Max: 98 (10-26-18 @ 04:05)  HR: 83 (10-26-18 @ 11:27)  BP: 131/76 (10-26-18 @ 11:27)  RR: 18 (10-26-18 @ 11:27)  SpO2: 99% (10-26-18 @ 11:27)  Wt(kg): --    PHYSICAL EXAM:  General: alert, no acute distress  Eyes:  anicteric, no conjunctival injection, no discharge  Oropharynx: no lesions or injection 	  Neck: supple, without adenopathy  Lungs: distant bs  to auscultation  Heart: regular rate and rhythm; no murmur, rubs or gallops  Abdomen: soft, nondistended, nontender, without mass or organomegaly  Skin: no lesions  Extremities: no clubbing, cyanosis, or edema. left leg contracted  Neurologic: alert, oriented, moves right  extremities    LAB RESULTS:                        11.0   13.0  )-----------( 334      ( 26 Oct 2018 10:41 )             35.5     10-26    143  |  106  |  30<H>  ----------------------------<  156<H>  4.1   |  24  |  0.73    Ca    9.3      26 Oct 2018 10:38          MICROBIOLOGY:  RECENT CULTURES:      RADIOLOGY REVIEWED:  < from: CT Angio Chest w/ IV Cont (10.24.18 @ 17:08) >  IMPRESSION:   No pulmonary embolism.    Multifocal pneumonia.    Indeterminate right breast nodule, measuring 1.3 cm, for which further   evaluation with dedicated breast imaging is recommended.          < end of copied text >    Assessment:  Patient with slowly improving respiratory failure from multifocal pneumonia  Plan:2 more days of vanco and cefepime

## 2018-10-26 NOTE — PROGRESS NOTE ADULT - ASSESSMENT
82 year old female PMH  h/o hemorrhagic CVA, PEG,  HTN, MI,      HLD, gout   p/w SOB and  acute respiratory distress/ bipap  in er/ ,  elevated wbc and  hypokalemia.    probable aspiration pma/  gram negative  pna/on admission,  was on iv  zosyn  dementia  by  history   ct chest ,  multifocal pna   npo/ on peg feedings,  on  synthroid, Kepra , celexa     now  with  MRSA bacteremia/  staph  hemolyticus/ CNS/  s, epidermidis,  mlple  organisms isolated   rpt  blood  c/s, no growth   echo, no vegetations,  normal  ef    on  Cefepime/ Vanco/  ,  chronic  aspiration/  pna/  gram negative    hypernatremia/  free  water via  peg  elevated wbc    on high flow o2./  pulm f/p  dm/  dr gilliam   cta  chest., no PE/  has multifocal pna  right breast  mass, unable  to  do  mammo as  an in pt/ surg onc  called      < from: CT Angio Chest w/ IV Cont (10.24.18 @ 17:08) >  IMPRESSION:   No pulmonary embolism.  Multifocal pneumonia.    Indeterminate right breast nodule, measuring 1.3 cm, for which further   evaluation with dedicated breast imaging is recommended.  < end of copied text > 82 year old female PMH  h/o hemorrhagic CVA, PEG,  HTN, MI,      HLD, gout   p/w SOB and  acute respiratory distress/ bipap  in er/ ,  elevated wbc and  hypokalemia.    probable aspiration pma/  gram negative  pna/on admission,  was on iv  zosyn  dementia  by  history   ct chest ,  multifocal pna   npo/ on peg feedings,  on  synthroid, Kepra , celexa     now  with  MRSA bacteremia/  staph  hemolyticus/ CNS/  s, epidermidis,  mlple  organisms isolated   rpt  blood  c/s, no growth   echo, no vegetations,  normal  ef    on  Cefepime/ Vanco/  ab to  be completed  on  10/ 27, pe r ID  ,  chronic  aspiration/  pna/  gram negative    hypernatremia/ RESOLVED, free  water via  peG     dm/  dr gilliam   cta  chest., no PE/  has multifocal pna  right breast  mass, unable  to  do  mammo as  an in pt/ surg onc  called      < from: CT Angio Chest w/ IV Cont (10.24.18 @ 17:08) >  IMPRESSION:   No pulmonary embolism.  Multifocal pneumonia.    Indeterminate right breast nodule, measuring 1.3 cm, for which further   evaluation with dedicated breast imaging is recommended.  < end of copied text >

## 2018-10-26 NOTE — PROGRESS NOTE ADULT - PROBLEM SELECTOR PLAN 1
Suspect ongoing hypoxia r/t shunt through bibasilar atelectasis. Almost complete compression of RLL   -Patient is debilitated, unable to get OOB 2nd LLE contraction. Start on Bipap 15/8 80% qHS to try and blow open lower lobes  -Clinically remains non-toxic appearing   -CTPA negative for PE  -GGO in RUL appears improved from prior CT  -Abx broadened to Vanco/Cefepime/Azithromycin, length per ID  -TTE is grossly normal  -Wean Hi Manny as tolerated  -Please sit >45 degrees daytime

## 2018-10-26 NOTE — PROGRESS NOTE ADULT - ASSESSMENT
83F hx CVA, bed bound, PEG dependent, HTN, HLD, admitted for SOB. Patient with Prolonged hospital stay with susp. PNA noted with hyperglycemia 309 mg/dL on am labs.    Patient with no history of diabetes.  HbA1c on admission 5.4%-5.6%.  Patient on PEG feeds at 30 cc/H continuous, tolerating well.  No hyperglycemia noted during this admission.  On 10/21 patient noted with SOB, received treatment which included Solumedrol 125 mg IV X1.  Hyperglycemia noted post treatment.  Ongoing work up by ID for possible infection.  Steroids were not continued.    Suspect hyperglycemia is due to high dose steroids treatment on 10/21.  Patient with improving infection, not given steroids again.  On Peg feeds yesterday changed to Jevity 55 cc/h. Tolerating feeds well.  Despite that still hyperglycemic 170-260 mg/dL.    Suggest:   Will add lantus insulin 10 units at HS, hold if peg feeds held.  FS with scale over 200 mg/dL Q6 hours.  Continue synthroid 50 mcg daily.

## 2018-10-26 NOTE — PROGRESS NOTE ADULT - SUBJECTIVE AND OBJECTIVE BOX
Follow-up Pulm Progress Note    No new respiratory events overnight.  Denies SOB/CP.   No complaints  100% on Hi Manny 40L/min 75%  98% on 40L/min 65%    Medications:  MEDICATIONS  (STANDING):  ALBUTerol/ipratropium for Nebulization 3 milliLiter(s) Nebulizer every 6 hours  allopurinol 300 milliGRAM(s) Oral daily  amLODIPine   Tablet 10 milliGRAM(s) Oral daily  Biotene Dry Mouth Oral Rinse 5 milliLiter(s) Swish and Spit four times a day  cefepime   IVPB      cefepime   IVPB 1000 milliGRAM(s) IV Intermittent every 12 hours  chlorhexidine 4% Liquid 1 Application(s) Topical <User Schedule>  citalopram 40 milliGRAM(s) Oral daily  heparin  Injectable 5000 Unit(s) SubCutaneous every 12 hours  insulin lispro (HumaLOG) corrective regimen sliding scale   SubCutaneous every 6 hours  levETIRAcetam  Solution 750 milliGRAM(s) Oral two times a day  levothyroxine 50 MICROGram(s) Oral daily  metoprolol tartrate 25 milliGRAM(s) Oral two times a day  pantoprazole   Suspension 40 milliGRAM(s) Oral daily  tiotropium 18 MICROgram(s) Capsule 1 Capsule(s) Inhalation daily  vancomycin  IVPB 1000 milliGRAM(s) IV Intermittent every 24 hours    MEDICATIONS  (PRN):          Vital Signs Last 24 Hrs  T(C): 36.5 (26 Oct 2018 11:27), Max: 36.7 (26 Oct 2018 04:05)  T(F): 97.7 (26 Oct 2018 11:27), Max: 98 (26 Oct 2018 04:05)  HR: 83 (26 Oct 2018 11:27) (75 - 97)  BP: 131/76 (26 Oct 2018 11:27) (124/76 - 135/80)  BP(mean): --  RR: 18 (26 Oct 2018 11:27) (18 - 23)  SpO2: 99% (26 Oct 2018 11:27) (95% - 100%) on 65%    ABG - ( 26 Oct 2018 09:20 )  pH, Arterial: 7.47  pH, Blood: x     /  pCO2: 38    /  pO2: 67    / HCO3: 28    / Base Excess: 4.0   /  SaO2: 93                    10-25 @ 07:01  -  10-26 @ 07:00  --------------------------------------------------------  IN: 545 mL / OUT: 400 mL / NET: 145 mL          LABS:                        11.0   13.0  )-----------( 334      ( 26 Oct 2018 10:41 )             35.5     10-26    143  |  106  |  30<H>  ----------------------------<  156<H>  4.1   |  24  |  0.73    Ca    9.3      26 Oct 2018 10:38            CAPILLARY BLOOD GLUCOSE      POCT Blood Glucose.: 177 mg/dL (26 Oct 2018 12:19)                        CULTURES: (if applicable)  Culture Results:   No growth at 5 days. (10-20 @ 21:41)  Culture Results:   No growth at 5 days. (10-20 @ 14:48)          Physical Examination:  PULM: Diminished BS bilaterally   CVS: S1, S2 heard    RADIOLOGY REVIEWED  CTA chest: Bibasilar atelectasis  RUL GGO improving  No PE Follow-up Pulm Progress Note    No new respiratory events overnight.  Denies SOB/CP.   No complaints  100% on Hi Manny 40L/min 75%  98% on 40L/min 65%    Medications:  MEDICATIONS  (STANDING):  ALBUTerol/ipratropium for Nebulization 3 milliLiter(s) Nebulizer every 6 hours  allopurinol 300 milliGRAM(s) Oral daily  amLODIPine   Tablet 10 milliGRAM(s) Oral daily  Biotene Dry Mouth Oral Rinse 5 milliLiter(s) Swish and Spit four times a day  cefepime   IVPB      cefepime   IVPB 1000 milliGRAM(s) IV Intermittent every 12 hours  chlorhexidine 4% Liquid 1 Application(s) Topical <User Schedule>  citalopram 40 milliGRAM(s) Oral daily  heparin  Injectable 5000 Unit(s) SubCutaneous every 12 hours  insulin lispro (HumaLOG) corrective regimen sliding scale   SubCutaneous every 6 hours  levETIRAcetam  Solution 750 milliGRAM(s) Oral two times a day  levothyroxine 50 MICROGram(s) Oral daily  metoprolol tartrate 25 milliGRAM(s) Oral two times a day  pantoprazole   Suspension 40 milliGRAM(s) Oral daily  tiotropium 18 MICROgram(s) Capsule 1 Capsule(s) Inhalation daily  vancomycin  IVPB 1000 milliGRAM(s) IV Intermittent every 24 hours    MEDICATIONS  (PRN):    Vital Signs Last 24 Hrs  T(C): 36.5 (26 Oct 2018 11:27), Max: 36.7 (26 Oct 2018 04:05)  T(F): 97.7 (26 Oct 2018 11:27), Max: 98 (26 Oct 2018 04:05)  HR: 83 (26 Oct 2018 11:27) (75 - 97)  BP: 131/76 (26 Oct 2018 11:27) (124/76 - 135/80)  BP(mean): --  RR: 18 (26 Oct 2018 11:27) (18 - 23)  SpO2: 99% (26 Oct 2018 11:27) (95% - 100%) on 65%    ABG - ( 26 Oct 2018 09:20 )  pH, Arterial: 7.47  pH, Blood: x     /  pCO2: 38    /  pO2: 67    / HCO3: 28    / Base Excess: 4.0   /  SaO2: 93        10-25 @ 07:01  -  10-26 @ 07:00  --------------------------------------------------------  IN: 545 mL / OUT: 400 mL / NET: 145 mL    LABS:                        11.0   13.0  )-----------( 334      ( 26 Oct 2018 10:41 )             35.5     10-26    143  |  106  |  30<H>  ----------------------------<  156<H>  4.1   |  24  |  0.73    Ca    9.3      26 Oct 2018 10:38    CAPILLARY BLOOD GLUCOSE      POCT Blood Glucose.: 177 mg/dL (26 Oct 2018 12:19)    CULTURES: (if applicable)  Culture Results:   No growth at 5 days. (10-20 @ 21:41)  Culture Results:   No growth at 5 days. (10-20 @ 14:48)    Physical Examination:  PULM: Diminished BS bilaterally   CVS: S1, S2 heard    RADIOLOGY REVIEWED  CTA chest: Bibasilar atelectasis  RUL GGO improving  No PE

## 2018-10-26 NOTE — PROGRESS NOTE ADULT - SUBJECTIVE AND OBJECTIVE BOX
- Patient seen and examined.  - In summary, patient is a 83 year old woman who presented with sob (22 Oct 2018 07:19)  - Today, patient is without complaints.         *****MEDICATIONS:    MEDICATIONS  (STANDING):  acetaminophen  IVPB .. 1000 milliGRAM(s) IV Intermittent once  ALBUTerol/ipratropium for Nebulization 3 milliLiter(s) Nebulizer every 6 hours  allopurinol 300 milliGRAM(s) Oral daily  amLODIPine   Tablet 10 milliGRAM(s) Oral daily  Biotene Dry Mouth Oral Rinse 5 milliLiter(s) Swish and Spit four times a day  cefepime   IVPB      cefepime   IVPB 1000 milliGRAM(s) IV Intermittent every 12 hours  chlorhexidine 4% Liquid 1 Application(s) Topical <User Schedule>  citalopram 40 milliGRAM(s) Oral daily  heparin  Injectable 5000 Unit(s) SubCutaneous every 12 hours  insulin lispro (HumaLOG) corrective regimen sliding scale   SubCutaneous every 6 hours  levETIRAcetam  Solution 750 milliGRAM(s) Oral two times a day  levothyroxine 50 MICROGram(s) Oral daily  metoprolol tartrate 25 milliGRAM(s) Oral two times a day  pantoprazole   Suspension 40 milliGRAM(s) Oral daily  tiotropium 18 MICROgram(s) Capsule 1 Capsule(s) Inhalation daily  vancomycin  IVPB 1000 milliGRAM(s) IV Intermittent every 24 hours    MEDICATIONS  (PRN):               ***** REVIEW OF SYSTEM:  GEN: no fever, no chills, no pain  RESP: no SOB, no cough, no sputum  CVS: no chest pain, no palpitations, no edema  GI: no abdominal pain, no nausea, no vomiting, no constipation, no diarrhea  : no dysuria, no frequency  NEURO: no headache, no dizziness  PSYCH: no depression, not anxious  Derm : no itching, no rash         ***** VITAL SIGNS:    T(F): 98 (10-26-18 @ 04:05), Max: 98 (10-25-18 @ 11:27)  HR: 97 (10-26-18 @ 04:05) (75 - 97)  BP: 135/80 (10-26-18 @ 04:05) (124/76 - 141/80)  RR: 20 (10-26-18 @ 04:05) (18 - 23)  SpO2: 100% (10-26-18 @ 04:05) (95% - 100%)  Wt(kg): --  ,   I&O's Summary    25 Oct 2018 07:01  -  26 Oct 2018 07:00  --------------------------------------------------------  IN: 545 mL / OUT: 400 mL / NET: 145 mL                   *****PHYSICAL EXAM:  GEN: A&O X 3 , NAD , comfortable  HEENT: NCAT, EOMI, MMM, no icterus  NECK: Supple, No JVD  CVS: S1S2 , regular , No M/R/G appreciated  PULM: CTA B/L,  no W/R/R appreciated  ABD.: soft. non tender, non distended,  bowel sounds present  Extrem: intact pulses , no edema noted  Derm: No rash or ecchymosis noted  PSYCH: normal mood, no depression, not anxious         *****LAB AND IMAGING:                                           10.7   12.58 )-----------( 385      ( 25 Oct 2018 07:42 )             35.0               10-25    144  |  107  |  33<H>  ----------------------------<  254<H>  4.1   |  24  |  0.71    Ca    9.3      25 Oct 2018 07:01                       ABG - ( 25 Oct 2018 07:02 )  pH, Arterial: 7.44  pH, Blood: x     /  pCO2: 40    /  pO2: 67    / HCO3: 26    / Base Excess: 2.4   /  SaO2: 92                      [All pertinent recent Imaging/Reports reviewed]  < from: Transthoracic Echocardiogram (10.17.18 @ 17:21) >  Conclusions:  1. Calcified trileaflet aortic valve with decreased  opening. Peak transaortic valve gradient equals 22 mm Hg,  mean transaortic valve gradient equals 11 mm Hg, estimated  aortic valve area equals 1.7 sqcm (by continuity equation),  aortic valve velocity time integral equals 35 cm,  consistent with mild aortic stenosis.  2. Increased relative wall thickness with normal left  ventricular mass index, consistent with concentric left  ventricular remodeling.  3. Endocardium not well visualized; grossly normal left  ventricular systolic function.  4. The right ventricle is not well visualized; grossly  normal right ventricular systolic function.  5. Estimated right ventricular systolic pressure equals 26  mm Hg, assuming right atrial pressure equals 8 mm Hg,  consistent with normal pulmonary pressures.  6. No mobile echodensities or vegetations are seen on the  aortic or mitral valve. The tricuspid and pulmonic valves  are not well visualized. Can not exclude endocarditis.  Consider MIKALA if clinically indicated.  *** Compared with echocardiogram of 7/5/2017, mild aortic  stenosis is now seen.    < end of copied text >         *****A S S E S S M E N T   A N D   P L A N :  83F with aspiration pneumonia  initial sinus tachycardia, HR improved  cont abx  keep O˜I  echo noted  dvt prophylaxis      __________________________  A. JACY Sharp.

## 2018-10-26 NOTE — PROGRESS NOTE ADULT - SUBJECTIVE AND OBJECTIVE BOX
Chief Complaint: 84 y/o Prolonged hospital stay with susp. PNA noted with hyperglycemia 309 mg/dL on am labs.    Patient with improving infection, not given steroids again.  On Peg feeds yesterday changed to Jevity 55 cc/h. Tolerating feeds well.  Despite that still hyperglycemic 170-260 mg/dL.      MEDICATIONS  (STANDING):  ALBUTerol/ipratropium for Nebulization 3 milliLiter(s) Nebulizer every 6 hours  allopurinol 300 milliGRAM(s) Oral daily  amLODIPine   Tablet 10 milliGRAM(s) Oral daily  Biotene Dry Mouth Oral Rinse 5 milliLiter(s) Swish and Spit four times a day  cefepime   IVPB      cefepime   IVPB 1000 milliGRAM(s) IV Intermittent every 12 hours  chlorhexidine 4% Liquid 1 Application(s) Topical <User Schedule>  citalopram 40 milliGRAM(s) Oral daily  heparin  Injectable 5000 Unit(s) SubCutaneous every 12 hours  insulin lispro (HumaLOG) corrective regimen sliding scale   SubCutaneous every 6 hours  levETIRAcetam  Solution 750 milliGRAM(s) Oral two times a day  levothyroxine 50 MICROGram(s) Oral daily  metoprolol tartrate 25 milliGRAM(s) Oral two times a day  pantoprazole   Suspension 40 milliGRAM(s) Oral daily  tiotropium 18 MICROgram(s) Capsule 1 Capsule(s) Inhalation daily  vancomycin  IVPB 1000 milliGRAM(s) IV Intermittent every 24 hours    MEDICATIONS  (PRN):      Allergies    Toradol (Urticaria (Mild to Mod); Rash (Mild to Mod))    Intolerances        PHYSICAL EXAM:  VITALS: T(C): 36.5 (10-26-18 @ 11:27)  T(F): 97.7 (10-26-18 @ 11:27), Max: 98 (10-26-18 @ 04:05)  HR: 83 (10-26-18 @ 11:27) (75 - 97)  BP: 131/76 (10-26-18 @ 11:27) (124/76 - 135/80)  RR:  (18 - 23)  SpO2:  (95% - 100%)  GENERAL: well-developed, peg feeds, O2 NC,   EYES: No proptosis, no lid lag, anicteric  HEENT:  Atraumatic, Normocephalic, moist mucous membranes  THYROID: Normal size, no palpable nodules  RESPIRATORY: Clear to auscultation bilaterally; No rales, rhonchi, wheezing  CARDIOVASCULAR: Regular rate and rhythm; No murmurs; peripheral edema  GI: Soft, nontender, non distended, peg placed, normal bowel sounds  SKIN: Dry, intact, No rashes or lesions  MUSCULOSKELETAL: Full range of motion, normal strength  NEURO: no focal deficit.  PSYCH: Alert but confused    POCT Blood Glucose.: 177 mg/dL (10-26-18 @ 12:19)  POCT Blood Glucose.: 259 mg/dL (10-26-18 @ 05:49)  POCT Blood Glucose.: 188 mg/dL (10-26-18 @ 00:23)  POCT Blood Glucose.: 197 mg/dL (10-25-18 @ 18:02)  POCT Blood Glucose.: 172 mg/dL (10-25-18 @ 11:29)  POCT Blood Glucose.: 192 mg/dL (10-25-18 @ 08:13)  POCT Blood Glucose.: 211 mg/dL (10-25-18 @ 06:03)  POCT Blood Glucose.: 171 mg/dL (10-25-18 @ 00:44)  POCT Blood Glucose.: 185 mg/dL (10-24-18 @ 18:30)  POCT Blood Glucose.: 109 mg/dL (10-24-18 @ 12:09)  POCT Blood Glucose.: 223 mg/dL (10-24-18 @ 07:47)  POCT Blood Glucose.: 266 mg/dL (10-24-18 @ 05:39)  POCT Blood Glucose.: 198 mg/dL (10-24-18 @ 00:32)  POCT Blood Glucose.: 197 mg/dL (10-23-18 @ 18:35)                            11.0   13.0  )-----------( 334      ( 26 Oct 2018 10:41 )             35.5       10-26    143  |  106  |  30<H>  ----------------------------<  156<H>  4.1   |  24  |  0.73    EGFR if : 90  EGFR if non : 77    Ca    9.3      10-26        Thyroid Function Tests:  10-01 @ 07:40 TSH 1.30 FreeT4 -- T3 -- Anti TPO -- Anti Thyroglobulin Ab -- TSI --      Hemoglobin A1C, Whole Blood: 5.6 % [4.0 - 5.6] (10-01-18 @ 07:40)  Hemoglobin A1C, Whole Blood: 5.4 % [4.0 - 5.6] (09-03-18 @ 10:11)  Hemoglobin A1C, Whole Blood: 5.5 % [4.0 - 5.6] (08-06-18 @ 07:48)

## 2018-10-27 LAB
ANION GAP SERPL CALC-SCNC: 12 MMOL/L — SIGNIFICANT CHANGE UP (ref 5–17)
BUN SERPL-MCNC: 31 MG/DL — HIGH (ref 7–23)
CALCIUM SERPL-MCNC: 9.4 MG/DL — SIGNIFICANT CHANGE UP (ref 8.4–10.5)
CHLORIDE SERPL-SCNC: 104 MMOL/L — SIGNIFICANT CHANGE UP (ref 96–108)
CO2 SERPL-SCNC: 26 MMOL/L — SIGNIFICANT CHANGE UP (ref 22–31)
CREAT SERPL-MCNC: 0.69 MG/DL — SIGNIFICANT CHANGE UP (ref 0.5–1.3)
GLUCOSE BLDC GLUCOMTR-MCNC: 128 MG/DL — HIGH (ref 70–99)
GLUCOSE BLDC GLUCOMTR-MCNC: 151 MG/DL — HIGH (ref 70–99)
GLUCOSE BLDC GLUCOMTR-MCNC: 166 MG/DL — HIGH (ref 70–99)
GLUCOSE BLDC GLUCOMTR-MCNC: 171 MG/DL — HIGH (ref 70–99)
GLUCOSE BLDC GLUCOMTR-MCNC: 190 MG/DL — HIGH (ref 70–99)
GLUCOSE SERPL-MCNC: 171 MG/DL — HIGH (ref 70–99)
HCT VFR BLD CALC: 33.6 % — LOW (ref 34.5–45)
HGB BLD-MCNC: 10.6 G/DL — LOW (ref 11.5–15.5)
MCHC RBC-ENTMCNC: 31.5 GM/DL — LOW (ref 32–36)
MCHC RBC-ENTMCNC: 31.6 PG — SIGNIFICANT CHANGE UP (ref 27–34)
MCV RBC AUTO: 100.3 FL — HIGH (ref 80–100)
PLATELET # BLD AUTO: 363 K/UL — SIGNIFICANT CHANGE UP (ref 150–400)
POTASSIUM SERPL-MCNC: 4.5 MMOL/L — SIGNIFICANT CHANGE UP (ref 3.5–5.3)
POTASSIUM SERPL-SCNC: 4.5 MMOL/L — SIGNIFICANT CHANGE UP (ref 3.5–5.3)
RBC # BLD: 3.35 M/UL — LOW (ref 3.8–5.2)
RBC # FLD: 15.7 % — HIGH (ref 10.3–14.5)
SODIUM SERPL-SCNC: 142 MMOL/L — SIGNIFICANT CHANGE UP (ref 135–145)
VANCOMYCIN TROUGH SERPL-MCNC: 16.2 UG/ML — SIGNIFICANT CHANGE UP (ref 10–20)
WBC # BLD: 11.81 K/UL — HIGH (ref 3.8–10.5)
WBC # FLD AUTO: 11.81 K/UL — HIGH (ref 3.8–10.5)

## 2018-10-27 RX ORDER — ACETAMINOPHEN 500 MG
650 TABLET ORAL ONCE
Qty: 0 | Refills: 0 | Status: COMPLETED | OUTPATIENT
Start: 2018-10-27 | End: 2018-10-27

## 2018-10-27 RX ORDER — ACETAMINOPHEN 500 MG
650 TABLET ORAL ONCE
Qty: 0 | Refills: 0 | Status: DISCONTINUED | OUTPATIENT
Start: 2018-10-27 | End: 2018-10-27

## 2018-10-27 RX ADMIN — Medication 650 MILLIGRAM(S): at 20:39

## 2018-10-27 RX ADMIN — HEPARIN SODIUM 5000 UNIT(S): 5000 INJECTION INTRAVENOUS; SUBCUTANEOUS at 17:50

## 2018-10-27 RX ADMIN — AMLODIPINE BESYLATE 10 MILLIGRAM(S): 2.5 TABLET ORAL at 05:35

## 2018-10-27 RX ADMIN — Medication 50 MICROGRAM(S): at 05:35

## 2018-10-27 RX ADMIN — Medication 3 MILLILITER(S): at 00:22

## 2018-10-27 RX ADMIN — CHLORHEXIDINE GLUCONATE 1 APPLICATION(S): 213 SOLUTION TOPICAL at 12:17

## 2018-10-27 RX ADMIN — Medication 650 MILLIGRAM(S): at 21:22

## 2018-10-27 RX ADMIN — CEFEPIME 100 MILLIGRAM(S): 1 INJECTION, POWDER, FOR SOLUTION INTRAMUSCULAR; INTRAVENOUS at 17:50

## 2018-10-27 RX ADMIN — Medication 5 MILLILITER(S): at 05:35

## 2018-10-27 RX ADMIN — LEVETIRACETAM 750 MILLIGRAM(S): 250 TABLET, FILM COATED ORAL at 17:50

## 2018-10-27 RX ADMIN — Medication 3 MILLILITER(S): at 17:50

## 2018-10-27 RX ADMIN — CITALOPRAM 40 MILLIGRAM(S): 10 TABLET, FILM COATED ORAL at 12:53

## 2018-10-27 RX ADMIN — PANTOPRAZOLE SODIUM 40 MILLIGRAM(S): 20 TABLET, DELAYED RELEASE ORAL at 12:53

## 2018-10-27 RX ADMIN — Medication 5 MILLILITER(S): at 12:53

## 2018-10-27 RX ADMIN — Medication 250 MILLIGRAM(S): at 08:20

## 2018-10-27 RX ADMIN — LEVETIRACETAM 750 MILLIGRAM(S): 250 TABLET, FILM COATED ORAL at 05:34

## 2018-10-27 RX ADMIN — Medication 3 MILLILITER(S): at 05:35

## 2018-10-27 RX ADMIN — Medication 5 MILLILITER(S): at 18:08

## 2018-10-27 RX ADMIN — INSULIN GLARGINE 10 UNIT(S): 100 INJECTION, SOLUTION SUBCUTANEOUS at 22:03

## 2018-10-27 RX ADMIN — Medication 5 MILLILITER(S): at 00:27

## 2018-10-27 RX ADMIN — HEPARIN SODIUM 5000 UNIT(S): 5000 INJECTION INTRAVENOUS; SUBCUTANEOUS at 05:35

## 2018-10-27 RX ADMIN — Medication 300 MILLIGRAM(S): at 12:53

## 2018-10-27 RX ADMIN — CEFEPIME 100 MILLIGRAM(S): 1 INJECTION, POWDER, FOR SOLUTION INTRAMUSCULAR; INTRAVENOUS at 05:30

## 2018-10-27 RX ADMIN — INSULIN GLARGINE 10 UNIT(S): 100 INJECTION, SOLUTION SUBCUTANEOUS at 00:23

## 2018-10-27 RX ADMIN — Medication 25 MILLIGRAM(S): at 17:56

## 2018-10-27 RX ADMIN — Medication 3 MILLILITER(S): at 12:53

## 2018-10-27 RX ADMIN — Medication 25 MILLIGRAM(S): at 05:35

## 2018-10-27 NOTE — PROGRESS NOTE ADULT - SUBJECTIVE AND OBJECTIVE BOX
CARDIOLOGY     PROGRESS  NOTE   ________________________________________________    CHIEF COMPLAINT:Patient is a 83y old  Female who presents with a chief complaint of sob (26 Oct 2018 17:21)    	  REVIEW OF SYSTEMS:  CONSTITUTIONAL: No fever, weight loss, or fatigue  EYES: No eye pain, visual disturbances, or discharge  ENT:  No difficulty hearing, tinnitus, vertigo; No sinus or throat pain  NECK: No pain or stiffness  RESPIRATORY: No cough, wheezing, chills or hemoptysis; No Shortness of Breath  CARDIOVASCULAR: No chest pain, palpitations, passing out, dizziness, or leg swelling  GASTROINTESTINAL: No abdominal or epigastric pain. No nausea, vomiting, or hematemesis; No diarrhea or constipation. No melena or hematochezia.  GENITOURINARY: No dysuria, frequency, hematuria, or incontinence  NEUROLOGICAL: No headaches, memory loss, loss of strength, numbness, or tremors  SKIN: No itching, burning, rashes, or lesions   LYMPH Nodes: No enlarged glands  ENDOCRINE: No heat or cold intolerance; No hair loss  MUSCULOSKELETAL: No joint pain or swelling; No muscle, back, or extremity pain  PSYCHIATRIC: No depression, anxiety, mood swings, or difficulty sleeping  HEME/LYMPH: No easy bruising, or bleeding gums  ALLERGY AND IMMUNOLOGIC: No hives or eczema	    [ ] All others negative	  [ ] Unable to obtain    PHYSICAL EXAM:  T(C): 36.7 (10-27-18 @ 04:10), Max: 36.7 (10-27-18 @ 04:10)  HR: 89 (10-27-18 @ 04:17) (75 - 89)  BP: 155/84 (10-27-18 @ 04:10) (125/67 - 155/84)  RR: 18 (10-27-18 @ 04:17) (18 - 20)  SpO2: 97% (10-27-18 @ 04:17) (94% - 99%)  Wt(kg): --  I&O's Summary    26 Oct 2018 07:01  -  27 Oct 2018 07:00  --------------------------------------------------------  IN: 1970 mL / OUT: 150 mL / NET: 1820 mL        Appearance: Normal	  HEENT:   Normal oral mucosa, PERRL, EOMI	  Lymphatic: No lymphadenopathy  Cardiovascular: Normal S1 S2, No JVD, No murmurs, No edema  Respiratory: Lungs clear to auscultation	  Psychiatry: A & O x 3, Mood & affect appropriate  Gastrointestinal:  Soft, Non-tender, + BS	  Skin: No rashes, No ecchymoses, No cyanosis	  Neurologic: Non-focal  Extremities: Normal range of motion, No clubbing, cyanosis or edema  Vascular: Peripheral pulses palpable 2+ bilaterally    MEDICATIONS  (STANDING):  ALBUTerol/ipratropium for Nebulization 3 milliLiter(s) Nebulizer every 6 hours  allopurinol 300 milliGRAM(s) Oral daily  amLODIPine   Tablet 10 milliGRAM(s) Oral daily  Biotene Dry Mouth Oral Rinse 5 milliLiter(s) Swish and Spit four times a day  cefepime   IVPB      cefepime   IVPB 1000 milliGRAM(s) IV Intermittent every 12 hours  chlorhexidine 4% Liquid 1 Application(s) Topical <User Schedule>  citalopram 40 milliGRAM(s) Oral daily  heparin  Injectable 5000 Unit(s) SubCutaneous every 12 hours  insulin glargine Injectable (LANTUS) 10 Unit(s) SubCutaneous at bedtime  insulin lispro (HumaLOG) corrective regimen sliding scale   SubCutaneous every 6 hours  levETIRAcetam  Solution 750 milliGRAM(s) Oral two times a day  levothyroxine 50 MICROGram(s) Oral daily  metoprolol tartrate 25 milliGRAM(s) Oral two times a day  pantoprazole   Suspension 40 milliGRAM(s) Oral daily  tiotropium 18 MICROgram(s) Capsule 1 Capsule(s) Inhalation daily  vancomycin  IVPB 1000 milliGRAM(s) IV Intermittent every 24 hours      TELEMETRY: 	    ECG:  	  RADIOLOGY:  OTHER: 	  	  LABS:	 	    CARDIAC MARKERS:                                11.0   13.0  )-----------( 334      ( 26 Oct 2018 10:41 )             35.5     10-27    142  |  104  |  31<H>  ----------------------------<  171<H>  4.5   |  26  |  0.69    Ca    9.4      27 Oct 2018 06:10      proBNP:   Lipid Profile:   HgA1c: Hemoglobin A1C, Whole Blood: 5.6 % (10-01 @ 07:40)    TSH: Thyroid Stimulating Hormone, Serum: 1.30 uIU/mL (10-01 @ 07:40)          Assessment and plan  ---------------------------  83F with aspiration pneumonia  initial sinus tachycardia, HR improved  cont abx  keep O˜I  echo noted  dvt prophylaxis

## 2018-10-27 NOTE — PROGRESS NOTE ADULT - SUBJECTIVE AND OBJECTIVE BOX
be d bound/ on  oxygen  REVIEW OF SYSTEMS:  GEN: no fever,    no chills  RESP: no SOB,   no cough  CVS: no chest pain,   no palpitations  GI: no abdominal pain,   no nausea,   no vomiting,   no constipation,   no diarrhea  : no dysuria,   no frequency  NEURO: no headache,   no dizziness  PSYCH: no depression,   not anxious  Derm : no rash    MEDICATIONS  (STANDING):  ALBUTerol/ipratropium for Nebulization 3 milliLiter(s) Nebulizer every 6 hours  allopurinol 300 milliGRAM(s) Oral daily  amLODIPine   Tablet 10 milliGRAM(s) Oral daily  Biotene Dry Mouth Oral Rinse 5 milliLiter(s) Swish and Spit four times a day  cefepime   IVPB      cefepime   IVPB 1000 milliGRAM(s) IV Intermittent every 12 hours  chlorhexidine 4% Liquid 1 Application(s) Topical <User Schedule>  citalopram 40 milliGRAM(s) Oral daily  heparin  Injectable 5000 Unit(s) SubCutaneous every 12 hours  insulin glargine Injectable (LANTUS) 10 Unit(s) SubCutaneous at bedtime  insulin lispro (HumaLOG) corrective regimen sliding scale   SubCutaneous every 6 hours  levETIRAcetam  Solution 750 milliGRAM(s) Oral two times a day  levothyroxine 50 MICROGram(s) Oral daily  metoprolol tartrate 25 milliGRAM(s) Oral two times a day  pantoprazole   Suspension 40 milliGRAM(s) Oral daily  tiotropium 18 MICROgram(s) Capsule 1 Capsule(s) Inhalation daily  vancomycin  IVPB 1000 milliGRAM(s) IV Intermittent every 24 hours    MEDICATIONS  (PRN):      Vital Signs Last 24 Hrs  T(C): 36.7 (27 Oct 2018 04:10), Max: 36.7 (27 Oct 2018 04:10)  T(F): 98 (27 Oct 2018 04:10), Max: 98 (27 Oct 2018 04:10)  HR: 89 (27 Oct 2018 04:17) (75 - 89)  BP: 155/84 (27 Oct 2018 04:10) (125/67 - 155/84)  BP(mean): --  RR: 18 (27 Oct 2018 04:17) (18 - 20)  SpO2: 97% (27 Oct 2018 04:17) (94% - 99%)  CAPILLARY BLOOD GLUCOSE      POCT Blood Glucose.: 166 mg/dL (27 Oct 2018 05:50)  POCT Blood Glucose.: 171 mg/dL (27 Oct 2018 00:20)  POCT Blood Glucose.: 107 mg/dL (26 Oct 2018 17:34)  POCT Blood Glucose.: 177 mg/dL (26 Oct 2018 12:19)    I&O's Summary    26 Oct 2018 07:01  -  27 Oct 2018 07:00  --------------------------------------------------------  IN: 1970 mL / OUT: 150 mL / NET: 1820 mL        PHYSICAL EXAM:  HEAD:  Atraumatic, Normocephalic  NECK: Supple, No   JVD  CHEST/LUNG:   no     rales,     no,    rhonchi  HEART: Regular rate and rhythm;         murmur  ABDOMEN: Soft, Nontender, ;   EXTREMITIES:  left weakness  NEUROLOGY:  alert    LABS:                        10.6   11.81 )-----------( 363      ( 27 Oct 2018 08:29 )             33.6     10-27    142  |  104  |  31<H>  ----------------------------<  171<H>  4.5   |  26  |  0.69    Ca    9.4      27 Oct 2018 06:10                ABG - ( 26 Oct 2018 09:20 )  pH, Arterial: 7.47  pH, Blood: x     /  pCO2: 38    /  pO2: 67    / HCO3: 28    / Base Excess: 4.0   /  SaO2: 93                  Hemoglobin A1C, Whole Blood: 5.6 % (10-01 @ 07:40)    Thyroid Stimulating Hormone, Serum: 1.30 uIU/mL (10-01 @ 07:40)          Consultant(s) Notes Reviewed:      Care Discussed with Consultants/Other Providers:

## 2018-10-28 DIAGNOSIS — N63.10 UNSPECIFIED LUMP IN THE RIGHT BREAST, UNSPECIFIED QUADRANT: ICD-10-CM

## 2018-10-28 LAB
ANION GAP SERPL CALC-SCNC: 11 MMOL/L — SIGNIFICANT CHANGE UP (ref 5–17)
BUN SERPL-MCNC: 27 MG/DL — HIGH (ref 7–23)
CALCIUM SERPL-MCNC: 9.4 MG/DL — SIGNIFICANT CHANGE UP (ref 8.4–10.5)
CHLORIDE SERPL-SCNC: 103 MMOL/L — SIGNIFICANT CHANGE UP (ref 96–108)
CO2 SERPL-SCNC: 25 MMOL/L — SIGNIFICANT CHANGE UP (ref 22–31)
CREAT SERPL-MCNC: 0.71 MG/DL — SIGNIFICANT CHANGE UP (ref 0.5–1.3)
GLUCOSE BLDC GLUCOMTR-MCNC: 103 MG/DL — HIGH (ref 70–99)
GLUCOSE BLDC GLUCOMTR-MCNC: 142 MG/DL — HIGH (ref 70–99)
GLUCOSE BLDC GLUCOMTR-MCNC: 155 MG/DL — HIGH (ref 70–99)
GLUCOSE BLDC GLUCOMTR-MCNC: 174 MG/DL — HIGH (ref 70–99)
GLUCOSE BLDC GLUCOMTR-MCNC: 180 MG/DL — HIGH (ref 70–99)
GLUCOSE SERPL-MCNC: 173 MG/DL — HIGH (ref 70–99)
HCT VFR BLD CALC: 35.3 % — SIGNIFICANT CHANGE UP (ref 34.5–45)
HGB BLD-MCNC: 10.7 G/DL — LOW (ref 11.5–15.5)
MCHC RBC-ENTMCNC: 30.3 GM/DL — LOW (ref 32–36)
MCHC RBC-ENTMCNC: 31.5 PG — SIGNIFICANT CHANGE UP (ref 27–34)
MCV RBC AUTO: 103.8 FL — HIGH (ref 80–100)
PLATELET # BLD AUTO: 331 K/UL — SIGNIFICANT CHANGE UP (ref 150–400)
POTASSIUM SERPL-MCNC: 4.7 MMOL/L — SIGNIFICANT CHANGE UP (ref 3.5–5.3)
POTASSIUM SERPL-SCNC: 4.7 MMOL/L — SIGNIFICANT CHANGE UP (ref 3.5–5.3)
RBC # BLD: 3.4 M/UL — LOW (ref 3.8–5.2)
RBC # FLD: 15.8 % — HIGH (ref 10.3–14.5)
SODIUM SERPL-SCNC: 139 MMOL/L — SIGNIFICANT CHANGE UP (ref 135–145)
WBC # BLD: 11.2 K/UL — HIGH (ref 3.8–10.5)
WBC # FLD AUTO: 11.2 K/UL — HIGH (ref 3.8–10.5)

## 2018-10-28 RX ORDER — ACETAMINOPHEN 500 MG
650 TABLET ORAL ONCE
Qty: 0 | Refills: 0 | Status: COMPLETED | OUTPATIENT
Start: 2018-10-28 | End: 2018-10-28

## 2018-10-28 RX ORDER — INSULIN GLARGINE 100 [IU]/ML
14 INJECTION, SOLUTION SUBCUTANEOUS AT BEDTIME
Qty: 0 | Refills: 0 | Status: DISCONTINUED | OUTPATIENT
Start: 2018-10-28 | End: 2018-11-01

## 2018-10-28 RX ADMIN — HEPARIN SODIUM 5000 UNIT(S): 5000 INJECTION INTRAVENOUS; SUBCUTANEOUS at 05:30

## 2018-10-28 RX ADMIN — Medication 300 MILLIGRAM(S): at 13:10

## 2018-10-28 RX ADMIN — Medication 3 MILLILITER(S): at 00:27

## 2018-10-28 RX ADMIN — Medication 250 MILLIGRAM(S): at 05:31

## 2018-10-28 RX ADMIN — Medication 25 MILLIGRAM(S): at 17:51

## 2018-10-28 RX ADMIN — LEVETIRACETAM 750 MILLIGRAM(S): 250 TABLET, FILM COATED ORAL at 17:51

## 2018-10-28 RX ADMIN — CHLORHEXIDINE GLUCONATE 1 APPLICATION(S): 213 SOLUTION TOPICAL at 13:09

## 2018-10-28 RX ADMIN — LEVETIRACETAM 750 MILLIGRAM(S): 250 TABLET, FILM COATED ORAL at 05:30

## 2018-10-28 RX ADMIN — Medication 650 MILLIGRAM(S): at 23:41

## 2018-10-28 RX ADMIN — Medication 25 MILLIGRAM(S): at 05:31

## 2018-10-28 RX ADMIN — Medication 3 MILLILITER(S): at 13:10

## 2018-10-28 RX ADMIN — Medication 650 MILLIGRAM(S): at 00:30

## 2018-10-28 RX ADMIN — HEPARIN SODIUM 5000 UNIT(S): 5000 INJECTION INTRAVENOUS; SUBCUTANEOUS at 17:51

## 2018-10-28 RX ADMIN — Medication 3 MILLILITER(S): at 17:42

## 2018-10-28 RX ADMIN — PANTOPRAZOLE SODIUM 40 MILLIGRAM(S): 20 TABLET, DELAYED RELEASE ORAL at 13:10

## 2018-10-28 RX ADMIN — Medication 5 MILLILITER(S): at 17:51

## 2018-10-28 RX ADMIN — CEFEPIME 100 MILLIGRAM(S): 1 INJECTION, POWDER, FOR SOLUTION INTRAMUSCULAR; INTRAVENOUS at 17:42

## 2018-10-28 RX ADMIN — Medication 3 MILLILITER(S): at 05:30

## 2018-10-28 RX ADMIN — Medication 3 MILLILITER(S): at 22:40

## 2018-10-28 RX ADMIN — Medication 5 MILLILITER(S): at 00:27

## 2018-10-28 RX ADMIN — CEFEPIME 100 MILLIGRAM(S): 1 INJECTION, POWDER, FOR SOLUTION INTRAMUSCULAR; INTRAVENOUS at 05:45

## 2018-10-28 RX ADMIN — CITALOPRAM 40 MILLIGRAM(S): 10 TABLET, FILM COATED ORAL at 13:10

## 2018-10-28 RX ADMIN — Medication 5 MILLILITER(S): at 13:10

## 2018-10-28 RX ADMIN — AMLODIPINE BESYLATE 10 MILLIGRAM(S): 2.5 TABLET ORAL at 05:30

## 2018-10-28 RX ADMIN — Medication 5 MILLILITER(S): at 22:37

## 2018-10-28 RX ADMIN — Medication 5 MILLILITER(S): at 05:30

## 2018-10-28 RX ADMIN — Medication 50 MICROGRAM(S): at 05:30

## 2018-10-28 RX ADMIN — INSULIN GLARGINE 14 UNIT(S): 100 INJECTION, SOLUTION SUBCUTANEOUS at 22:36

## 2018-10-28 NOTE — CONSULT NOTE ADULT - PROBLEM SELECTOR RECOMMENDATION 9
inpatient workup of this nodule will be difficult. If she has a lengthy stay, a breast ultrasound could be considered. Ideally, this and a mammogram would be performed as an outpatient. I'd favor outpatient followup with a breast surgeon to clarify this further.

## 2018-10-28 NOTE — PROGRESS NOTE ADULT - SUBJECTIVE AND OBJECTIVE BOX
CARDIOLOGY     PROGRESS  NOTE   ________________________________________________    CHIEF COMPLAINT:Patient is a 83y old  Female who presents with a chief complaint of sob (28 Oct 2018 07:39)  doing better.  	  REVIEW OF SYSTEMS:  CONSTITUTIONAL: No fever, weight loss, or fatigue  EYES: No eye pain, visual disturbances, or discharge  ENT:  No difficulty hearing, tinnitus, vertigo; No sinus or throat pain  NECK: No pain or stiffness  RESPIRATORY: No cough, wheezing, chills or hemoptysis; + Shortness of Breath  CARDIOVASCULAR: No chest pain, palpitations, passing out, dizziness, or leg swelling  GASTROINTESTINAL: No abdominal or epigastric pain. No nausea, vomiting, or hematemesis; No diarrhea or constipation. No melena or hematochezia.  GENITOURINARY: No dysuria, frequency, hematuria, or incontinence  NEUROLOGICAL: No headaches, memory loss, loss of strength, numbness, or tremors  SKIN: No itching, burning, rashes, or lesions   LYMPH Nodes: No enlarged glands  ENDOCRINE: No heat or cold intolerance; No hair loss  MUSCULOSKELETAL: No joint pain or swelling; No muscle, back, or extremity pain  PSYCHIATRIC: No depression, anxiety, mood swings, or difficulty sleeping  HEME/LYMPH: No easy bruising, or bleeding gums  ALLERGY AND IMMUNOLOGIC: No hives or eczema	    [ ] All others negative	  [ ] Unable to obtain    PHYSICAL EXAM:  T(C): 36.9 (10-28-18 @ 04:36), Max: 37.1 (10-27-18 @ 20:53)  HR: 72 (10-28-18 @ 06:52) (72 - 94)  BP: 122/72 (10-28-18 @ 04:36) (110/70 - 135/78)  RR: 18 (10-28-18 @ 04:36) (16 - 19)  SpO2: 100% (10-28-18 @ 06:52) (94% - 100%)  Wt(kg): --  I&O's Summary    27 Oct 2018 07:01  -  28 Oct 2018 07:00  --------------------------------------------------------  IN: 960 mL / OUT: 0 mL / NET: 960 mL        Appearance: Normal	  HEENT:   Normal oral mucosa, PERRL, EOMI	  Lymphatic: No lymphadenopathy  Cardiovascular: Normal S1 S2, No JVD, + murmurs, No edema  Respiratory: Lungs clear to auscultation	  Psychiatry: A & O x 3, Mood & affect appropriate  Gastrointestinal:  Soft, Non-tender, + BS	  Skin: No rashes, No ecchymoses, No cyanosis	  Neurologic: Non-focal  Extremities: Normal range of motion, No clubbing, cyanosis or edema  Vascular: Peripheral pulses palpable 2+ bilaterally    MEDICATIONS  (STANDING):  ALBUTerol/ipratropium for Nebulization 3 milliLiter(s) Nebulizer every 6 hours  allopurinol 300 milliGRAM(s) Oral daily  amLODIPine   Tablet 10 milliGRAM(s) Oral daily  Biotene Dry Mouth Oral Rinse 5 milliLiter(s) Swish and Spit four times a day  cefepime   IVPB      cefepime   IVPB 1000 milliGRAM(s) IV Intermittent every 12 hours  chlorhexidine 4% Liquid 1 Application(s) Topical <User Schedule>  citalopram 40 milliGRAM(s) Oral daily  heparin  Injectable 5000 Unit(s) SubCutaneous every 12 hours  insulin glargine Injectable (LANTUS) 10 Unit(s) SubCutaneous at bedtime  insulin lispro (HumaLOG) corrective regimen sliding scale   SubCutaneous every 6 hours  levETIRAcetam  Solution 750 milliGRAM(s) Oral two times a day  levothyroxine 50 MICROGram(s) Oral daily  metoprolol tartrate 25 milliGRAM(s) Oral two times a day  pantoprazole   Suspension 40 milliGRAM(s) Oral daily  tiotropium 18 MICROgram(s) Capsule 1 Capsule(s) Inhalation daily  vancomycin  IVPB 1000 milliGRAM(s) IV Intermittent every 24 hours      TELEMETRY: 	    ECG:  	  RADIOLOGY:  OTHER: 	  	  LABS:	 	    CARDIAC MARKERS:                                10.7   11.20 )-----------( 331      ( 28 Oct 2018 08:30 )             35.3     10-28    139  |  103  |  27<H>  ----------------------------<  173<H>  4.7   |  25  |  0.71    Ca    9.4      28 Oct 2018 06:35      proBNP:   Lipid Profile:   HgA1c: Hemoglobin A1C, Whole Blood: 5.6 % (10-01 @ 07:40)    TSH: Thyroid Stimulating Hormone, Serum: 1.30 uIU/mL (10-01 @ 07:40)          Assessment and plan  ---------------------------  83F with aspiration pneumonia  initial sinus tachycardia, HR improved  cont abx  keep O˜I  echo noted  dvt prophylaxis CARDIOLOGY     PROGRESS  NOTE   ________________________________________________    CHIEF COMPLAINT:Patient is a 83y old  Female who presents with a chief complaint of sob (28 Oct 2018 07:39)  doing better.  	  REVIEW OF SYSTEMS:  CONSTITUTIONAL: No fever, weight loss, or fatigue  EYES: No eye pain, visual disturbances, or discharge  ENT:  No difficulty hearing, tinnitus, vertigo; No sinus or throat pain  NECK: No pain or stiffness  RESPIRATORY: No cough, wheezing, chills or hemoptysis; + Shortness of Breath  CARDIOVASCULAR: No chest pain, palpitations, passing out, dizziness, or leg swelling  GASTROINTESTINAL: No abdominal or epigastric pain. No nausea, vomiting, or hematemesis; No diarrhea or constipation. No melena or hematochezia.  GENITOURINARY: No dysuria, frequency, hematuria, or incontinence  NEUROLOGICAL: No headaches, memory loss, loss of strength, numbness, or tremors  SKIN: No itching, burning, rashes, or lesions   LYMPH Nodes: No enlarged glands  ENDOCRINE: No heat or cold intolerance; No hair loss  MUSCULOSKELETAL: No joint pain or swelling; No muscle, back, or extremity pain  PSYCHIATRIC: No depression, anxiety, mood swings, or difficulty sleeping  HEME/LYMPH: No easy bruising, or bleeding gums  ALLERGY AND IMMUNOLOGIC: No hives or eczema	    [ ] All others negative	  [ ] Unable to obtain    PHYSICAL EXAM:  T(C): 36.9 (10-28-18 @ 04:36), Max: 37.1 (10-27-18 @ 20:53)  HR: 72 (10-28-18 @ 06:52) (72 - 94)  BP: 122/72 (10-28-18 @ 04:36) (110/70 - 135/78)  RR: 18 (10-28-18 @ 04:36) (16 - 19)  SpO2: 100% (10-28-18 @ 06:52) (94% - 100%)  Wt(kg): --  I&O's Summary    27 Oct 2018 07:01  -  28 Oct 2018 07:00  --------------------------------------------------------  IN: 960 mL / OUT: 0 mL / NET: 960 mL        Appearance: Normal	  HEENT:   Normal oral mucosa, PERRL, EOMI	  Lymphatic: No lymphadenopathy  Cardiovascular: Normal S1 S2, No JVD, + murmurs, No edema  Respiratory: Lungs clear to auscultation	  Psychiatry: A & O x 3, Mood & affect appropriate  Gastrointestinal:  Soft, Non-tender, + BS	  Skin: No rashes, No ecchymoses, No cyanosis	  Neurologic: Non-focal  Extremities: Normal range of motion, No clubbing, cyanosis or edema  Vascular: Peripheral pulses palpable 2+ bilaterally    MEDICATIONS  (STANDING):  ALBUTerol/ipratropium for Nebulization 3 milliLiter(s) Nebulizer every 6 hours  allopurinol 300 milliGRAM(s) Oral daily  amLODIPine   Tablet 10 milliGRAM(s) Oral daily  Biotene Dry Mouth Oral Rinse 5 milliLiter(s) Swish and Spit four times a day  cefepime   IVPB      cefepime   IVPB 1000 milliGRAM(s) IV Intermittent every 12 hours  chlorhexidine 4% Liquid 1 Application(s) Topical <User Schedule>  citalopram 40 milliGRAM(s) Oral daily  heparin  Injectable 5000 Unit(s) SubCutaneous every 12 hours  insulin glargine Injectable (LANTUS) 10 Unit(s) SubCutaneous at bedtime  insulin lispro (HumaLOG) corrective regimen sliding scale   SubCutaneous every 6 hours  levETIRAcetam  Solution 750 milliGRAM(s) Oral two times a day  levothyroxine 50 MICROGram(s) Oral daily  metoprolol tartrate 25 milliGRAM(s) Oral two times a day  pantoprazole   Suspension 40 milliGRAM(s) Oral daily  tiotropium 18 MICROgram(s) Capsule 1 Capsule(s) Inhalation daily  vancomycin  IVPB 1000 milliGRAM(s) IV Intermittent every 24 hours      TELEMETRY: 	    ECG:  	  RADIOLOGY:  OTHER: 	  	  LABS:	 	    CARDIAC MARKERS:                                10.7   11.20 )-----------( 331      ( 28 Oct 2018 08:30 )             35.3     10-28    139  |  103  |  27<H>  ----------------------------<  173<H>  4.7   |  25  |  0.71    Ca    9.4      28 Oct 2018 06:35      proBNP:   Lipid Profile:   HgA1c: Hemoglobin A1C, Whole Blood: 5.6 % (10-01 @ 07:40)    TSH: Thyroid Stimulating Hormone, Serum: 1.30 uIU/mL (10-01 @ 07:40)          Assessment and plan  ---------------------------  83F with aspiration pneumonia  initial sinus tachycardia, HR improved  cont abx  keep O˜I  echo noted  dvt prophylaxis  dc tele

## 2018-10-28 NOTE — PROGRESS NOTE ADULT - SUBJECTIVE AND OBJECTIVE BOX
be d bound. less  sob. more alert    REVIEW OF SYSTEMS:  GEN: no fever,    no chills  RESP: no SOB,   no cough  CVS: no chest pain,   no palpitations  GI: no abdominal pain,   no nausea,   no vomiting,   no constipation,   no diarrhea  : no dysuria,   no frequency  NEURO: no headache,   no dizziness  PSYCH: no depression,   not anxious  Derm : no rash    MEDICATIONS  (STANDING):  ALBUTerol/ipratropium for Nebulization 3 milliLiter(s) Nebulizer every 6 hours  allopurinol 300 milliGRAM(s) Oral daily  amLODIPine   Tablet 10 milliGRAM(s) Oral daily  Biotene Dry Mouth Oral Rinse 5 milliLiter(s) Swish and Spit four times a day  cefepime   IVPB      cefepime   IVPB 1000 milliGRAM(s) IV Intermittent every 12 hours  chlorhexidine 4% Liquid 1 Application(s) Topical <User Schedule>  citalopram 40 milliGRAM(s) Oral daily  heparin  Injectable 5000 Unit(s) SubCutaneous every 12 hours  insulin glargine Injectable (LANTUS) 10 Unit(s) SubCutaneous at bedtime  insulin lispro (HumaLOG) corrective regimen sliding scale   SubCutaneous every 6 hours  levETIRAcetam  Solution 750 milliGRAM(s) Oral two times a day  levothyroxine 50 MICROGram(s) Oral daily  metoprolol tartrate 25 milliGRAM(s) Oral two times a day  pantoprazole   Suspension 40 milliGRAM(s) Oral daily  tiotropium 18 MICROgram(s) Capsule 1 Capsule(s) Inhalation daily  vancomycin  IVPB 1000 milliGRAM(s) IV Intermittent every 24 hours    MEDICATIONS  (PRN):      Vital Signs Last 24 Hrs  T(C): 36.9 (28 Oct 2018 04:36), Max: 37.1 (27 Oct 2018 20:53)  T(F): 98.5 (28 Oct 2018 04:36), Max: 98.8 (27 Oct 2018 20:53)  HR: 72 (28 Oct 2018 06:52) (72 - 94)  BP: 122/72 (28 Oct 2018 04:36) (110/70 - 135/78)  BP(mean): --  RR: 18 (28 Oct 2018 04:36) (16 - 19)  SpO2: 100% (28 Oct 2018 06:52) (94% - 100%)  CAPILLARY BLOOD GLUCOSE      POCT Blood Glucose.: 180 mg/dL (28 Oct 2018 06:48)  POCT Blood Glucose.: 174 mg/dL (28 Oct 2018 00:22)  POCT Blood Glucose.: 190 mg/dL (27 Oct 2018 21:55)  POCT Blood Glucose.: 151 mg/dL (27 Oct 2018 18:15)  POCT Blood Glucose.: 128 mg/dL (27 Oct 2018 12:04)    I&O's Summary    27 Oct 2018 07:01  -  28 Oct 2018 07:00  --------------------------------------------------------  IN: 960 mL / OUT: 0 mL / NET: 960 mL        PHYSICAL EXAM:  HEAD:  Atraumatic, Normocephalic  NECK: Supple, No   JVD  CHEST/LUNG:   no     rales,     no,    rhonchi  HEART: Regular rate and rhythm;         murmur  ABDOMEN: Soft, Nontender, ;   EXTREMITIES:     no   edema/ left weakness  NEUROLOGY:  alert    LABS:                        10.6   11.81 )-----------( 363      ( 27 Oct 2018 08:29 )             33.6     10-28    139  |  103  |  27<H>  ----------------------------<  173<H>  4.7   |  25  |  0.71    Ca    9.4      28 Oct 2018 06:35                ABG - ( 26 Oct 2018 09:20 )  pH, Arterial: 7.47  pH, Blood: x     /  pCO2: 38    /  pO2: 67    / HCO3: 28    / Base Excess: 4.0   /  SaO2: 93                  Hemoglobin A1C, Whole Blood: 5.6 % (10-01 @ 07:40)    Thyroid Stimulating Hormone, Serum: 1.30 uIU/mL (10-01 @ 07:40)          Consultant(s) Notes Reviewed:      Care Discussed with Consultants/Other Providers:

## 2018-10-28 NOTE — PROGRESS NOTE ADULT - SUBJECTIVE AND OBJECTIVE BOX
CC: f/u for pneumonia    Patient reports  feels better  REVIEW OF SYSTEMS:  All other review of systems negative (Comprehensive ROS)    Antimicrobials Day #  :10/10  cefepime   IVPB      cefepime   IVPB 1000 milliGRAM(s) IV Intermittent every 12 hours  vancomycin  IVPB 1000 milliGRAM(s) IV Intermittent every 24 hours    Other Medications Reviewed    T(F): 98.5 (10-28-18 @ 04:36), Max: 98.8 (10-27-18 @ 20:53)  HR: 87 (10-28-18 @ 17:41)  BP: 137/74 (10-28-18 @ 17:41)  RR: 20 (10-28-18 @ 17:41)  SpO2: 97% (10-28-18 @ 17:41)  Wt(kg): --    PHYSICAL EXAM:  General: alert, no acute distress on hi flow  Eyes:  anicteric, no conjunctival injection, no discharge  Oropharynx: no lesions or injection 	  Neck: supple, without adenopathy  Lungs: rales to auscultation  Heart: regular rate and rhythm; no murmur, rubs or gallops  Abdomen: soft, nondistended, nontender, without mass or organomegaly, peg  Skin: no lesions  Extremities: no clubbing, cyanosis, or edema  Neurologic: alert, oriented,left leg contracted    LAB RESULTS:                        10.7   11.20 )-----------( 331      ( 28 Oct 2018 08:30 )             35.3     10-28    139  |  103  |  27<H>  ----------------------------<  173<H>  4.7   |  25  |  0.71    Ca    9.4      28 Oct 2018 06:35          MICROBIOLOGY:  RECENT CULTURES:      RADIOLOGY REVIEWED:  ct chest neg pe, multifocal pna      Assessment:  Patient with cva, dysphagia, peg admitted with multifocal pna. Finished zithromax now finished vanco and cefepime. had false positive mrsa blood culture  Plan: stop vanco and cefepime after today  pulmonary toilet

## 2018-10-28 NOTE — CONSULT NOTE ADULT - SUBJECTIVE AND OBJECTIVE BOX
Chief Complaint:    HPI:  International Travel? No(1)    .	   83F hx CVA, bed bound, PEG dependent, HTN, HLD, BIBEMS for resp distress x1day.  associated with cough,  rigors, subjective fevers. no n/v no abd pain no cp.  pt d oes have a h/o have hx of aspiration pna.	    She was found to have recurrent aspiration PNA and has improved on IV abx. A CT scan on admission revealed a right breast nodule. The patient has dementia and is not a reliable historian.     Past Medical History:  CVA (cerebral vascular accident)    Dyslipidemia    Gout    HTN (hypertension)    MI (myocardial infarction)    Personal history of asbestosis    UTI (lower urinary tract infection). (15 Oct 2018 16:54)         PAST MEDICAL & SURGICAL HISTORY:  CVA (cerebral vascular accident)  ETOH abuse  UTI (lower urinary tract infection)  Dyslipidemia  Gout  Personal history of asbestosis  HTN (hypertension)  MI (myocardial infarction)  History of benign breast tumor  History of left shoulder fracture      SOCIAL HISTORY:  Smoking - Non smoker   Alcohol - Social  Drugs - No drug use    FAMILY HISTORY:  No pertinent family history in first degree relatives      MEDICATIONS  (STANDING):  ALBUTerol/ipratropium for Nebulization 3 milliLiter(s) Nebulizer every 6 hours  allopurinol 300 milliGRAM(s) Oral daily  amLODIPine   Tablet 10 milliGRAM(s) Oral daily  Biotene Dry Mouth Oral Rinse 5 milliLiter(s) Swish and Spit four times a day  cefepime   IVPB      cefepime   IVPB 1000 milliGRAM(s) IV Intermittent every 12 hours  chlorhexidine 4% Liquid 1 Application(s) Topical <User Schedule>  citalopram 40 milliGRAM(s) Oral daily  heparin  Injectable 5000 Unit(s) SubCutaneous every 12 hours  insulin glargine Injectable (LANTUS) 14 Unit(s) SubCutaneous at bedtime  insulin lispro (HumaLOG) corrective regimen sliding scale   SubCutaneous every 6 hours  levETIRAcetam  Solution 750 milliGRAM(s) Oral two times a day  levothyroxine 50 MICROGram(s) Oral daily  metoprolol tartrate 25 milliGRAM(s) Oral two times a day  pantoprazole   Suspension 40 milliGRAM(s) Oral daily  tiotropium 18 MICROgram(s) Capsule 1 Capsule(s) Inhalation daily  vancomycin  IVPB 1000 milliGRAM(s) IV Intermittent every 24 hours    MEDICATIONS  (PRN):      Vital Signs Last 24 Hrs  T(C): 36.9 (28 Oct 2018 04:36), Max: 37.1 (27 Oct 2018 20:53)  T(F): 98.5 (28 Oct 2018 04:36), Max: 98.8 (27 Oct 2018 20:53)  HR: 78 (28 Oct 2018 12:03) (72 - 90)  BP: 122/72 (28 Oct 2018 04:36) (110/70 - 135/78)  BP(mean): --  RR: 18 (28 Oct 2018 04:36) (18 - 19)  SpO2: 96% (28 Oct 2018 12:03) (94% - 100%)      PHYSICAL EXAM:      Constitutional: chronically ill appearing woman in NAD. sleepy but arousable and cooperative. Not fully oriented.    Eyes: anicteric    ENMT:    Neck:    Lymph nodes: none    Breasts: no palpable masses    Respiratory: decreased breath sounds in bases    Cardiovascular: RRR    Gastrointestinal: nontender    Extremities: mild edema    Skin:                    LABS:                          10.7   11.20 )-----------( 331      ( 28 Oct 2018 08:30 )             35.3         Mean Cell Volume : 103.8 fl  Mean Cell Hemoglobin : 31.5 pg  Mean Cell Hemoglobin Concentration : 30.3 gm/dL  Auto Neutrophil # : x  Auto Lymphocyte # : x  Auto Monocyte # : x  Auto Eosinophil # : x  Auto Basophil # : x  Auto Neutrophil % : x  Auto Lymphocyte % : x  Auto Monocyte % : x  Auto Eosinophil % : x  Auto Basophil % : x      Serial CBC's  10-28 @ 08:30  Hct-35.3 / Hgb-10.7 / Plat-331 / RBC-3.40 / WBC-11.20  Serial CBC's  10-27 @ 08:29  Hct-33.6 / Hgb-10.6 / Plat-363 / RBC-3.35 / WBC-11.81  Serial CBC's  10-26 @ 10:41  Hct-35.5 / Hgb-11.0 / Plat-334 / RBC-3.52 / WBC-13.0  Serial CBC's  10-25 @ 07:42  Hct-35.0 / Hgb-10.7 / Plat-385 / RBC-3.44 / WBC-12.58      10-28    139  |  103  |  27<H>  ----------------------------<  173<H>  4.7   |  25  |  0.71    Ca    9.4      28 Oct 2018 06:35

## 2018-10-28 NOTE — PROGRESS NOTE ADULT - ASSESSMENT
82 year old female PMH  h/o hemorrhagic CVA, PEG,  HTN, MI,      HLD, gout   p/w SOB and  acute respiratory distress/ bipap  in er/ ,  elevated wbc and  hypokalemia.    probable aspiration pma/  gram negative  pna/on admission,  was on iv  zosyn  dementia  by  history   ct chest ,  multifocal pna   npo/ on peg feedings,  on  synthroid, Kepra , celexa     now  with  MRSA bacteremia/  staph  hemolyticus/ CNS/  s, epidermidis,  mlple  organisms isolated   rpt  blood  c/s, no growth   echo, no vegetations,  normal  ef    on  Cefepime/ Vanco/  ab to  be completed  on  10/ 27, pe r ID  ,  chronic  aspiration/  pna/  gram negative    hypernatremia/ RESOLVED, free  water via  peG     dm/  dr gilliam   cta  chest., no PE/  has multifocal pna  right breast  mass, unable  to  do  mammo as  an in pt/ surg onc  called/ dr scott roper  oncology/ dr victoria called  when oxygen requirements  are less, then will start d/c planning      < from: CT Angio Chest w/ IV Cont (10.24.18 @ 17:08) >  IMPRESSION:   No pulmonary embolism.  Multifocal pneumonia.    Indeterminate right breast nodule, measuring 1.3 cm, for which further   evaluation with dedicated breast imaging is recommended.  < end of copied text >

## 2018-10-28 NOTE — CONSULT NOTE ADULT - ASSESSMENT
83 year old woman with multiple medical problems including dementia, PEG dependent, prior CVA, admitted with aspiration PNA. She has a right breast nodule of unclear etiology on CT. No masses palpable. This could be an early stage malignancy.

## 2018-10-28 NOTE — PROGRESS NOTE ADULT - ASSESSMENT
83F hx CVA, bed bound, PEG dependent, HTN, HLD, admitted for SOB. Patient with Prolonged hospital stay with susp. PNA noted with hyperglycemia 309 mg/dL on am labs.    Patient with no history of diabetes.  HbA1c on admission 5.4%-5.6%.  Patient on PEG feeds at 30 cc/H continuous, tolerating well.  No hyperglycemia noted during this admission.  On 10/21 patient noted with SOB, received treatment which included Solumedrol 125 mg IV X1.  Hyperglycemia noted post treatment.  Ongoing work up by ID for possible infection.  Steroids were not continued.    Suspect hyperglycemia is due to high dose steroids treatment on 10/21.  Patient with improving infection, not given steroids again.  On Peg feeds currently on Jevity 55 cc/h for 22 hours, tolerating feeds well.  Lantus insulin 10 units daily started, FS better but still mostly 103-190 mg/dL.    Suggest:   Will increase lantus insulin 14 units at HS, hold if peg feeds held.  FS with scale over 200 mg/dL Q6 hours.  Continue synthroid 50 mcg daily.

## 2018-10-28 NOTE — PROGRESS NOTE ADULT - SUBJECTIVE AND OBJECTIVE BOX
Chief Complaint: 84 y/o Prolonged hospital stay with susp. PNA noted with hyperglycemia 309 mg/dL on am labs.    Patient with improving infection, not given steroids again.  On Peg feeds currently on Jevity 55 cc/h for 22 hours, tolerating feeds well.  Lantus insulin 10 units daily started, FS better but still mostly 103-190 mg/dL.      MEDICATIONS  (STANDING):  ALBUTerol/ipratropium for Nebulization 3 milliLiter(s) Nebulizer every 6 hours  allopurinol 300 milliGRAM(s) Oral daily  amLODIPine   Tablet 10 milliGRAM(s) Oral daily  Biotene Dry Mouth Oral Rinse 5 milliLiter(s) Swish and Spit four times a day  cefepime   IVPB      cefepime   IVPB 1000 milliGRAM(s) IV Intermittent every 12 hours  chlorhexidine 4% Liquid 1 Application(s) Topical <User Schedule>  citalopram 40 milliGRAM(s) Oral daily  heparin  Injectable 5000 Unit(s) SubCutaneous every 12 hours  insulin glargine Injectable (LANTUS) 10 Unit(s) SubCutaneous at bedtime  insulin lispro (HumaLOG) corrective regimen sliding scale   SubCutaneous every 6 hours  levETIRAcetam  Solution 750 milliGRAM(s) Oral two times a day  levothyroxine 50 MICROGram(s) Oral daily  metoprolol tartrate 25 milliGRAM(s) Oral two times a day  pantoprazole   Suspension 40 milliGRAM(s) Oral daily  tiotropium 18 MICROgram(s) Capsule 1 Capsule(s) Inhalation daily  vancomycin  IVPB 1000 milliGRAM(s) IV Intermittent every 24 hours    MEDICATIONS  (PRN):      Allergies    Toradol (Urticaria (Mild to Mod); Rash (Mild to Mod))    Intolerances        PHYSICAL EXAM:  VITALS: T(C): 36.9 (10-28-18 @ 04:36)  T(F): 98.5 (10-28-18 @ 04:36), Max: 98.8 (10-27-18 @ 20:53)  HR: 78 (10-28-18 @ 12:03) (72 - 90)  BP: 122/72 (10-28-18 @ 04:36) (110/70 - 135/78)  RR:  (18 - 19)  SpO2:  (94% - 100%)  GENERAL: NAD, peg feeds, O2 NC,   EYES: No proptosis, no lid lag, anicteric  HEENT:  Atraumatic, Normocephalic, moist mucous membranes  THYROID: Normal size, no palpable nodules  RESPIRATORY: Clear to auscultation bilaterally; No rales, rhonchi, wheezing  CARDIOVASCULAR: Regular rate and rhythm; No murmurs; peripheral edema  GI: Soft, nontender, non distended, peg placed, normal bowel sounds  SKIN: Dry, intact, No rashes or lesions  MUSCULOSKELETAL: Full range of motion, normal strength  NEURO: no focal deficit.  PSYCH: Alert but confused      POCT Blood Glucose.: 103 mg/dL (10-28-18 @ 12:13)  POCT Blood Glucose.: 180 mg/dL (10-28-18 @ 06:48)  POCT Blood Glucose.: 174 mg/dL (10-28-18 @ 00:22)  POCT Blood Glucose.: 190 mg/dL (10-27-18 @ 21:55)  POCT Blood Glucose.: 151 mg/dL (10-27-18 @ 18:15)  POCT Blood Glucose.: 128 mg/dL (10-27-18 @ 12:04)  POCT Blood Glucose.: 166 mg/dL (10-27-18 @ 05:50)  POCT Blood Glucose.: 171 mg/dL (10-27-18 @ 00:20)  POCT Blood Glucose.: 107 mg/dL (10-26-18 @ 17:34)  POCT Blood Glucose.: 177 mg/dL (10-26-18 @ 12:19)  POCT Blood Glucose.: 259 mg/dL (10-26-18 @ 05:49)  POCT Blood Glucose.: 188 mg/dL (10-26-18 @ 00:23)  POCT Blood Glucose.: 197 mg/dL (10-25-18 @ 18:02)                            10.7   11.20 )-----------( 331      ( 28 Oct 2018 08:30 )             35.3       10-28    139  |  103  |  27<H>  ----------------------------<  173<H>  4.7   |  25  |  0.71    EGFR if : 93  EGFR if non : 80    Ca    9.4      10-28        Thyroid Function Tests:  10-01 @ 07:40 TSH 1.30 FreeT4 -- T3 -- Anti TPO -- Anti Thyroglobulin Ab -- TSI --      Hemoglobin A1C, Whole Blood: 5.6 % [4.0 - 5.6] (10-01-18 @ 07:40)  Hemoglobin A1C, Whole Blood: 5.4 % [4.0 - 5.6] (09-03-18 @ 10:11)  Hemoglobin A1C, Whole Blood: 5.5 % [4.0 - 5.6] (08-06-18 @ 07:48)

## 2018-10-29 LAB
ANION GAP SERPL CALC-SCNC: 12 MMOL/L — SIGNIFICANT CHANGE UP (ref 5–17)
BUN SERPL-MCNC: 22 MG/DL — SIGNIFICANT CHANGE UP (ref 7–23)
CALCIUM SERPL-MCNC: 9.1 MG/DL — SIGNIFICANT CHANGE UP (ref 8.4–10.5)
CHLORIDE SERPL-SCNC: 96 MMOL/L — SIGNIFICANT CHANGE UP (ref 96–108)
CO2 SERPL-SCNC: 23 MMOL/L — SIGNIFICANT CHANGE UP (ref 22–31)
CREAT SERPL-MCNC: 0.67 MG/DL — SIGNIFICANT CHANGE UP (ref 0.5–1.3)
GLUCOSE BLDC GLUCOMTR-MCNC: 100 MG/DL — HIGH (ref 70–99)
GLUCOSE BLDC GLUCOMTR-MCNC: 106 MG/DL — HIGH (ref 70–99)
GLUCOSE BLDC GLUCOMTR-MCNC: 154 MG/DL — HIGH (ref 70–99)
GLUCOSE BLDC GLUCOMTR-MCNC: 177 MG/DL — HIGH (ref 70–99)
GLUCOSE BLDC GLUCOMTR-MCNC: 202 MG/DL — HIGH (ref 70–99)
GLUCOSE SERPL-MCNC: 177 MG/DL — HIGH (ref 70–99)
HCT VFR BLD CALC: 32.8 % — LOW (ref 34.5–45)
HGB BLD-MCNC: 10.1 G/DL — LOW (ref 11.5–15.5)
MCHC RBC-ENTMCNC: 30.8 GM/DL — LOW (ref 32–36)
MCHC RBC-ENTMCNC: 31.4 PG — SIGNIFICANT CHANGE UP (ref 27–34)
MCV RBC AUTO: 101.9 FL — HIGH (ref 80–100)
PLATELET # BLD AUTO: 319 K/UL — SIGNIFICANT CHANGE UP (ref 150–400)
POTASSIUM SERPL-MCNC: 5.4 MMOL/L — HIGH (ref 3.5–5.3)
POTASSIUM SERPL-SCNC: 5.4 MMOL/L — HIGH (ref 3.5–5.3)
RBC # BLD: 3.22 M/UL — LOW (ref 3.8–5.2)
RBC # FLD: 15.5 % — HIGH (ref 10.3–14.5)
SODIUM SERPL-SCNC: 131 MMOL/L — LOW (ref 135–145)
WBC # BLD: 9.14 K/UL — SIGNIFICANT CHANGE UP (ref 3.8–10.5)
WBC # FLD AUTO: 9.14 K/UL — SIGNIFICANT CHANGE UP (ref 3.8–10.5)

## 2018-10-29 PROCEDURE — 99223 1ST HOSP IP/OBS HIGH 75: CPT

## 2018-10-29 RX ADMIN — Medication 3 MILLILITER(S): at 17:06

## 2018-10-29 RX ADMIN — LEVETIRACETAM 750 MILLIGRAM(S): 250 TABLET, FILM COATED ORAL at 05:39

## 2018-10-29 RX ADMIN — INSULIN GLARGINE 14 UNIT(S): 100 INJECTION, SOLUTION SUBCUTANEOUS at 23:03

## 2018-10-29 RX ADMIN — Medication 50 MICROGRAM(S): at 05:39

## 2018-10-29 RX ADMIN — CITALOPRAM 40 MILLIGRAM(S): 10 TABLET, FILM COATED ORAL at 13:28

## 2018-10-29 RX ADMIN — Medication 5 MILLILITER(S): at 17:06

## 2018-10-29 RX ADMIN — Medication 250 MILLIGRAM(S): at 06:08

## 2018-10-29 RX ADMIN — Medication 25 MILLIGRAM(S): at 17:06

## 2018-10-29 RX ADMIN — Medication 5 MILLILITER(S): at 23:03

## 2018-10-29 RX ADMIN — Medication 300 MILLIGRAM(S): at 13:28

## 2018-10-29 RX ADMIN — PANTOPRAZOLE SODIUM 40 MILLIGRAM(S): 20 TABLET, DELAYED RELEASE ORAL at 13:28

## 2018-10-29 RX ADMIN — Medication 5 MILLILITER(S): at 13:28

## 2018-10-29 RX ADMIN — CHLORHEXIDINE GLUCONATE 1 APPLICATION(S): 213 SOLUTION TOPICAL at 12:46

## 2018-10-29 RX ADMIN — Medication 3 MILLILITER(S): at 13:28

## 2018-10-29 RX ADMIN — Medication 3 MILLILITER(S): at 05:39

## 2018-10-29 RX ADMIN — HEPARIN SODIUM 5000 UNIT(S): 5000 INJECTION INTRAVENOUS; SUBCUTANEOUS at 17:06

## 2018-10-29 RX ADMIN — AMLODIPINE BESYLATE 10 MILLIGRAM(S): 2.5 TABLET ORAL at 05:39

## 2018-10-29 RX ADMIN — Medication 2: at 06:46

## 2018-10-29 RX ADMIN — CEFEPIME 100 MILLIGRAM(S): 1 INJECTION, POWDER, FOR SOLUTION INTRAMUSCULAR; INTRAVENOUS at 07:24

## 2018-10-29 RX ADMIN — HEPARIN SODIUM 5000 UNIT(S): 5000 INJECTION INTRAVENOUS; SUBCUTANEOUS at 05:39

## 2018-10-29 RX ADMIN — Medication 5 MILLILITER(S): at 05:39

## 2018-10-29 RX ADMIN — Medication 25 MILLIGRAM(S): at 05:39

## 2018-10-29 RX ADMIN — LEVETIRACETAM 750 MILLIGRAM(S): 250 TABLET, FILM COATED ORAL at 17:06

## 2018-10-29 NOTE — PROVIDER CONTACT NOTE (OTHER) - SITUATION
Patient HR remains elevated throughout shift. Pt on continuous pulse ox monitoring HR remains elevated since start of shift (range 100-119bpm), on BiPAP, all other VSS. Denies any chest pain
Patient ordered for BMP. Patient screaming during blood draw,  tensed up, withdrawing arm making it difficult to obtain blood specimen. Refusing other attempts.
Patient tachycardic 115
Patient's BIPAP was discontinued as per NP, pt to be placed on 4LNC. Pt on 4LNC with desaturations to 88%
Patient's SpO2 86 while on aerosol mask FiO2 50, LPM 12
Pt c/o nausea and refusing nocturnal bipap.
Pt had 6 beats wct on tele
Pt. pulse ox is ranging from 88-90%.
Unable to get GASA this AM
pulse ox 85% on 6 L nasal canula
PAT on tele HR ,180s for 9 seconds

## 2018-10-29 NOTE — PROGRESS NOTE ADULT - SUBJECTIVE AND OBJECTIVE BOX
bed  bound,  no  cp/sob    REVIEW OF SYSTEMS:  GEN: no fever,    no chills  RESP: no SOB,   no cough  CVS: no chest pain,   no palpitations  GI: no abdominal pain,   no nausea,   no vomiting,   no constipation,   no diarrhea  : no dysuria,   no frequency  NEURO: no headache,   no dizziness  PSYCH: no depression,   not anxious  Derm : no rash    MEDICATIONS  (STANDING):  ALBUTerol/ipratropium for Nebulization 3 milliLiter(s) Nebulizer every 6 hours  allopurinol 300 milliGRAM(s) Oral daily  amLODIPine   Tablet 10 milliGRAM(s) Oral daily  Biotene Dry Mouth Oral Rinse 5 milliLiter(s) Swish and Spit four times a day  cefepime   IVPB      cefepime   IVPB 1000 milliGRAM(s) IV Intermittent every 12 hours  chlorhexidine 4% Liquid 1 Application(s) Topical <User Schedule>  citalopram 40 milliGRAM(s) Oral daily  heparin  Injectable 5000 Unit(s) SubCutaneous every 12 hours  insulin glargine Injectable (LANTUS) 14 Unit(s) SubCutaneous at bedtime  insulin lispro (HumaLOG) corrective regimen sliding scale   SubCutaneous every 6 hours  levETIRAcetam  Solution 750 milliGRAM(s) Oral two times a day  levothyroxine 50 MICROGram(s) Oral daily  metoprolol tartrate 25 milliGRAM(s) Oral two times a day  pantoprazole   Suspension 40 milliGRAM(s) Oral daily  tiotropium 18 MICROgram(s) Capsule 1 Capsule(s) Inhalation daily  vancomycin  IVPB 1000 milliGRAM(s) IV Intermittent every 24 hours    MEDICATIONS  (PRN):      Vital Signs Last 24 Hrs  T(C): 36.8 (29 Oct 2018 04:06), Max: 37.2 (28 Oct 2018 20:52)  T(F): 98.3 (29 Oct 2018 04:06), Max: 99 (28 Oct 2018 20:52)  HR: 88 (29 Oct 2018 06:21) (74 - 91)  BP: 112/57 (29 Oct 2018 04:06) (112/57 - 137/74)  BP(mean): --  RR: 18 (29 Oct 2018 04:06) (18 - 20)  SpO2: 99% (29 Oct 2018 06:21) (96% - 100%)  CAPILLARY BLOOD GLUCOSE      POCT Blood Glucose.: 202 mg/dL (29 Oct 2018 06:39)  POCT Blood Glucose.: 177 mg/dL (29 Oct 2018 00:52)  POCT Blood Glucose.: 142 mg/dL (28 Oct 2018 22:32)  POCT Blood Glucose.: 155 mg/dL (28 Oct 2018 18:00)  POCT Blood Glucose.: 103 mg/dL (28 Oct 2018 12:13)    I&O's Summary    28 Oct 2018 07:01  -  29 Oct 2018 07:00  --------------------------------------------------------  IN: 1225 mL / OUT: 0 mL / NET: 1225 mL        PHYSICAL EXAM:  HEAD:  Atraumatic, Normocephalic  NECK: Supple, No   JVD  CHEST/LUNG:   no     rales,     no,    rhonchi  HEART: Regular rate and rhythm;         murmur  ABDOMEN: Soft, Nontender, ;   EXTREMITIES:    no    edema  NEUROLOGY:  alert    LABS:                        10.7   11.20 )-----------( 331      ( 28 Oct 2018 08:30 )             35.3     10-28    139  |  103  |  27<H>  ----------------------------<  173<H>  4.7   |  25  |  0.71    Ca    9.4      28 Oct 2018 06:35                    Hemoglobin A1C, Whole Blood: 5.6 % (10-01 @ 07:40)    Thyroid Stimulating Hormone, Serum: 1.30 uIU/mL (10-01 @ 07:40)          Consultant(s) Notes Reviewed:      Care Discussed with Consultants/Other Providers:

## 2018-10-29 NOTE — CONSULT NOTE ADULT - ATTENDING COMMENTS
- I have seen and examined the patient on rounds. Patient's chart, labs, images and reports reviewed.  Consulted for a new diagnosis of R breast mass  Pt admitted to medical service with multiple medical problems, CVA, PEG dependent on high alpa oxygen, COPD, with anew finding of R breast mass, pt claims was informed on routine exam this admission. Pt denies self breast exam.  On my exam- 2 cm firm mass in the right upper breast with normal appearing NAC, no palpable aillary or cervical lymphadenopathy. left breast no palpable masses.  Plan:  Evaluation of the R breast mass can be performed with mamogram and sonogram followed by a core needle biopsy as an outpt.  If preferred to obtain diagnosis while admitted, USG guided core needle biopsy can be arranged in house.  Pt can follow up with me in the office for workup.  I have provided my contact information to the patient.  -Thank you for allowing me to take care of your patient.  - I will sign off at this point, please recall with new concerns and/or questions. Thank you for allowing me to take care of your patient as always.  Regards  Dixon Hollis MD  Division of Surgical Oncology  [address] [place] [id number]  Ph:[phone]
Mrs. Scott is an elderly woman with history of CVA admitted with pneumonia, initially cultures showed MRSA and treated only with Vancomycin.  However culture is consistent now with a contaminant.  She has had persistent fever and became tachypneic today.  Chest CT shows multifocal pneumonia with dense consolidation in LLL and likely atelectasis.  Antibioitcs were broadened and she has been placed on BiPAP with improvement.  ON exam, currently awake, verbal, stating she feels better,  hemodynamically stable.  Rhonchi noted on the right anteriorly.  Labs reviewed as noted above.      Imp:  pneumonia, persistent fever, tachypnea secondary to fever.    REc:  Check urine antigen for legionella and add coverage with Azithromycin.  continue BiPAP support as needed. Keep HOB elevated at 45 degrees.    Would hold feeds for today in event she worsens  would decrease fluids to maintenance (50cc/hr) as she will be npo  Does not require transfer to MICU at this time.   REconsult as needed.
GOC discussion in the case she decompensates further

## 2018-10-29 NOTE — CONSULT NOTE ADULT - CONSULT REASON
SOB/tachypnea
Tachypnea
hyperglycemia
mrsa bacteremia
right breast mass
sob/ respiratory distress
sob/tachycardia
breast mass

## 2018-10-29 NOTE — PROGRESS NOTE ADULT - ASSESSMENT
82 year old female PMH  h/o hemorrhagic CVA, PEG,  HTN, MI,      HLD, gout   p/w SOB and  acute respiratory distress/ bipap  in er/ ,  elevated wbc and  hypokalemia.    probable aspiration pma/  gram negative  pna/on admission,  was on iv  zosyn  dementia  by  history   ct chest ,  multifocal pna   npo/ on peg feedings,  on  synthroid, Kepra , celexa     now  with  MRSA bacteremia/  staph  hemolyticus/ CNS/  s, epidermidis,  mlple  organisms isolated   rpt  blood  c/s, no growth   echo, no vegetations,  normal  ef    on  Cefepime/ Vanco/ completed  on  10/ 28, pe r ID  ,  chronic  aspiration/  pna/  gram negative  dm/  dr gilliam   cta  chest., no PE/  has multifocal pna  right breast  mass, unable  to  do  mammo as  an in pt/   oncology/ dr victoria  saw  pt. needs  op  f/p  with breast  surgeon  and oncology,  and  w/p, when stable from pulm perspective  when oxygen requirements  are less, then will start d/c planning.   now  on hi flow  oxygen      < from: CT Angio Chest w/ IV Cont (10.24.18 @ 17:08) >  IMPRESSION:   No pulmonary embolism.  Multifocal pneumonia.    Indeterminate right breast nodule, measuring 1.3 cm, for which further   evaluation with dedicated breast imaging is recommended.  < end of copied text >

## 2018-10-29 NOTE — PROVIDER CONTACT NOTE (OTHER) - NAME OF MD/NP/PA/DO NOTIFIED:
Alfonso Mendez PA
Alfonso Mendez PA
Aurora NP
DANILO Hatfield
Jez NP
MD dena
NP Aurora Rainey
NP Ejz
NP Zeta Keily
NP Zeta Keily
RRT team
riddhi, DANILO

## 2018-10-29 NOTE — PROGRESS NOTE ADULT - PROBLEM SELECTOR PLAN 1
Suspect ongoing hypoxia r/t shunt through bibasilar atelectasis. Almost complete compression of RLL   -Patient is debilitated, unable to get OOB 2nd LLE contraction. cont on Bipap 15/8 80% qHS to try and blow open lower lobes  -Clinically remains non-toxic appearing   -CTPA negative for PE  -GGO in RUL appears improved from prior CT  -Abx broadened to Vanco/Cefepime/Azithromycin, length per ID  -TTE is grossly normal  -Wean Hi Manny as tolerated  -Please sit >45 degrees daytime

## 2018-10-29 NOTE — PROVIDER CONTACT NOTE (OTHER) - ASSESSMENT
see vital flow sheet, pt asymptomatic no c/o chest pain
A&Ox3, forgetful at times.
Patient HR remains elevated throughout shift. Pt on continuous pulse ox monitoring HR remains elevated since start of shift (range 100-119bpm), on BiPAP, all other VSS. Remains A&OX4, denies CP, SOB, dizziness, palpitations. Has been receiving neb (albuterol) txmts
Pt asymptomatic vss, Bp 123/77, HR 98, 02 95%, temp 97.6 oral
Pt laying in bed, c/o nausea and dry heaving. Pt currently had bipap on for about 3 hours, c/o dry mouth and nausea. Pt states "I want the mask off!"
Pt laying in bed, unable to get GASA, s/p RT attempt x3.
Pt resting in bed, pt is A&Ox4, Pt does not demonstrate any s/s of respiratory distress, pt denies having chest pain or SOB. Patient's SpO2 86 while on aerosol mask FiO2 50, LPM 12, RR 20. /71, HR 88.
Pt resting in bed, pt is A&Ox4, Pt does not demonstrate any s/s of respiratory distress, pt denies having chest pain. .
Pt resting in bed, pt is A&Ox4, Pt does not demonstrate any s/s of respiratory distress. Patient's BIPAP was discontinued as per NP, pt to be placed on 4LNC. Pt on 4LNC with desaturations to 88%
Pt. has no complains of c/p, SOB, lung sounds are coarse crackles. Pt. is on the bipap fio2 50%.
pulse ox 85% on 6 L nasal canula, RR24

## 2018-10-29 NOTE — PROGRESS NOTE ADULT - SUBJECTIVE AND OBJECTIVE BOX
CARDIOLOGY     PROGRESS  NOTE   ________________________________________________    CHIEF COMPLAINT:Patient is a 83y old  Female who presents with a chief complaint of sob (29 Oct 2018 07:08)  doing much better.  	  REVIEW OF SYSTEMS:  CONSTITUTIONAL: No fever, weight loss, or fatigue  EYES: No eye pain, visual disturbances, or discharge  ENT:  No difficulty hearing, tinnitus, vertigo; No sinus or throat pain  NECK: No pain or stiffness  RESPIRATORY: No cough, wheezing, chills or hemoptysis; decrease  Shortness of Breath  CARDIOVASCULAR: No chest pain, palpitations, passing out, dizziness, or leg swelling  GASTROINTESTINAL: No abdominal or epigastric pain. No nausea, vomiting, or hematemesis; No diarrhea or constipation. No melena or hematochezia.  GENITOURINARY: No dysuria, frequency, hematuria, or incontinence  NEUROLOGICAL: No headaches, memory loss, loss of strength, numbness, or tremors  SKIN: No itching, burning, rashes, or lesions   LYMPH Nodes: No enlarged glands  ENDOCRINE: No heat or cold intolerance; No hair loss  MUSCULOSKELETAL: No joint pain or swelling; No muscle, back, or extremity pain  PSYCHIATRIC: No depression, anxiety, mood swings, or difficulty sleeping  HEME/LYMPH: No easy bruising, or bleeding gums  ALLERGY AND IMMUNOLOGIC: No hives or eczema	    [ ] All others negative	  [ ] Unable to obtain    PHYSICAL EXAM:  T(C): 36.8 (10-29-18 @ 04:06), Max: 37.2 (10-28-18 @ 20:52)  HR: 88 (10-29-18 @ 06:21) (74 - 91)  BP: 112/57 (10-29-18 @ 04:06) (112/57 - 137/74)  RR: 18 (10-29-18 @ 04:06) (18 - 20)  SpO2: 99% (10-29-18 @ 06:21) (96% - 100%)  Wt(kg): --  I&O's Summary    28 Oct 2018 07:01  -  29 Oct 2018 07:00  --------------------------------------------------------  IN: 1225 mL / OUT: 0 mL / NET: 1225 mL        Appearance: Normal	  HEENT:   Normal oral mucosa, PERRL, EOMI	  Lymphatic: No lymphadenopathy  Cardiovascular: Normal S1 S2, No JVD, No murmurs, No edema  Respiratory: rhonchi	  Psychiatry: A & O x 3, Mood & affect appropriate  Gastrointestinal:  Soft, Non-tender, + BS	  Skin: No rashes, No ecchymoses, No cyanosis	  Neurologic: Non-focal  Extremities: Normal range of motion, No clubbing, cyanosis or edema  Vascular: Peripheral pulses palpable 2+ bilaterally    MEDICATIONS  (STANDING):  ALBUTerol/ipratropium for Nebulization 3 milliLiter(s) Nebulizer every 6 hours  allopurinol 300 milliGRAM(s) Oral daily  amLODIPine   Tablet 10 milliGRAM(s) Oral daily  Biotene Dry Mouth Oral Rinse 5 milliLiter(s) Swish and Spit four times a day  cefepime   IVPB      cefepime   IVPB 1000 milliGRAM(s) IV Intermittent every 12 hours  chlorhexidine 4% Liquid 1 Application(s) Topical <User Schedule>  citalopram 40 milliGRAM(s) Oral daily  heparin  Injectable 5000 Unit(s) SubCutaneous every 12 hours  insulin glargine Injectable (LANTUS) 14 Unit(s) SubCutaneous at bedtime  insulin lispro (HumaLOG) corrective regimen sliding scale   SubCutaneous every 6 hours  levETIRAcetam  Solution 750 milliGRAM(s) Oral two times a day  levothyroxine 50 MICROGram(s) Oral daily  metoprolol tartrate 25 milliGRAM(s) Oral two times a day  pantoprazole   Suspension 40 milliGRAM(s) Oral daily  tiotropium 18 MICROgram(s) Capsule 1 Capsule(s) Inhalation daily  vancomycin  IVPB 1000 milliGRAM(s) IV Intermittent every 24 hours      TELEMETRY: 	    ECG:  	  RADIOLOGY:  OTHER: 	  	  LABS:	 	    CARDIAC MARKERS:                                10.7   11.20 )-----------( 331      ( 28 Oct 2018 08:30 )             35.3     10-28    139  |  103  |  27<H>  ----------------------------<  173<H>  4.7   |  25  |  0.71    Ca    9.4      28 Oct 2018 06:35      proBNP:   Lipid Profile:   HgA1c: Hemoglobin A1C, Whole Blood: 5.6 % (10-01 @ 07:40)    TSH: Thyroid Stimulating Hormone, Serum: 1.30 uIU/mL (10-01 @ 07:40)          Assessment and plan  ---------------------------  83F with aspiration pneumonia  initial sinus tachycardia, HR improved  cont abx  keep O˜I  echo noted  dvt prophylaxis

## 2018-10-29 NOTE — PROGRESS NOTE ADULT - ASSESSMENT
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 presents with fever, sob, and what was initially felt to be MRSA bacteremia- PCR +, but in d/w Micro, final report, admission Bld Cxs- no MRSA on plates, growth of 2 strains CoNS- S haemolyticus and S epidermitis; this best explained as gross contamination- all repeat Bld Cxs negative  False + MRSA PCR in setting of overgrowth of CoNS  Multifocal pneumonia primary focus  Remains afebrile, relatively hypoxic, continuous to require high flow  CXR no significant change  Poor resp reserve  suspect a steroid induced leukocytosis which has moderated  No signs of active infection  Recommendations:  1.stop all antibiotics as planned  2.Supportive care per pulmonary  3.Additional ID w/u as needed.At this point no indication to extend therapy although still tenuous.

## 2018-10-29 NOTE — CONSULT NOTE ADULT - SUBJECTIVE AND OBJECTIVE BOX
GENERAL SURGERY CONSULT NOTE    Patient is a 83y old  Female who presents with a chief complaint of sob (29 Oct 2018 15:37)      HPI:  83F hx CVA, bed bound, dementia, PEG dependent, HTN, HLD, BIBEMS for resp distress x1day admitted 10/15 and found to have aspiration pna. R breast mass noted on CT chest and oncology requesting outpatient follow up with breast surgeon. Pt on abx for pna and high flow nasal cannula.    Past Medical History:  CVA (cerebral vascular accident)    Dyslipidemia    Gout    HTN (hypertension)    MI (myocardial infarction)    Personal history of asbestosis    UTI (lower urinary tract infection). (15 Oct 2018 16:54)      PAST MEDICAL & SURGICAL HISTORY:  CVA (cerebral vascular accident)  ETOH abuse  UTI (lower urinary tract infection)  Dyslipidemia  Gout  Personal history of asbestosis  HTN (hypertension)  MI (myocardial infarction)  History of benign breast tumor  History of left shoulder fracture    [  ] No significant past history as reviewed with the patient and family    FAMILY HISTORY:  No pertinent family history in first degree relatives    [  ] Family history not pertinent as reviewed with the patient and family    SOCIAL HISTORY:    MEDICATIONS  (STANDING):  ALBUTerol/ipratropium for Nebulization 3 milliLiter(s) Nebulizer every 6 hours  allopurinol 300 milliGRAM(s) Oral daily  amLODIPine   Tablet 10 milliGRAM(s) Oral daily  Biotene Dry Mouth Oral Rinse 5 milliLiter(s) Swish and Spit four times a day  chlorhexidine 4% Liquid 1 Application(s) Topical <User Schedule>  citalopram 40 milliGRAM(s) Oral daily  heparin  Injectable 5000 Unit(s) SubCutaneous every 12 hours  insulin glargine Injectable (LANTUS) 14 Unit(s) SubCutaneous at bedtime  insulin lispro (HumaLOG) corrective regimen sliding scale   SubCutaneous every 6 hours  levETIRAcetam  Solution 750 milliGRAM(s) Oral two times a day  levothyroxine 50 MICROGram(s) Oral daily  metoprolol tartrate 25 milliGRAM(s) Oral two times a day  pantoprazole   Suspension 40 milliGRAM(s) Oral daily    MEDICATIONS  (PRN):    Allergies    Toradol (Urticaria (Mild to Mod); Rash (Mild to Mod))    Intolerances        Vital Signs Last 24 Hrs  T(C): 36.2 (29 Oct 2018 11:12), Max: 37.2 (28 Oct 2018 20:52)  T(F): 97.1 (29 Oct 2018 11:12), Max: 99 (28 Oct 2018 20:52)  HR: 87 (29 Oct 2018 16:56) (74 - 91)  BP: 132/77 (29 Oct 2018 16:56) (112/57 - 137/74)  BP(mean): --  RR: 20 (29 Oct 2018 16:25) (14 - 20)  SpO2: 100% (29 Oct 2018 16:56) (97% - 100%)  Daily     Daily     Exam:  General: awake  HEENT: supple  Resp: nonlabored on supplemental O2  Chest: equal chest rise, no palpable breast mass, skin changes, or drainage  Abd: soft, PEG in place  Neuro: at baseline                          10.1   9.14  )-----------( 319      ( 29 Oct 2018 09:36 )             32.8     10-29    131<L>  |  96  |  22  ----------------------------<  177<H>  5.4<H>   |  23  |  0.67    Ca    9.1      29 Oct 2018 07:14            IMAGING STUDIES:  < from: CT Angio Chest w/ IV Cont (10.24.18 @ 17:08) >  IMPRESSION:   No pulmonary embolism.    Multifocal pneumonia.    Indeterminate right breast nodule, measuring 1.3 cm, for which further   evaluation with dedicated breast imaging is recommended.    < end of copied text >

## 2018-10-29 NOTE — PROVIDER CONTACT NOTE (OTHER) - DATE AND TIME:
20-Oct-2018 11:45
18-Oct-2018 13:50
18-Oct-2018 17:00
19-Oct-2018 19:00
20-Oct-2018 01:45
21-Oct-2018 02:10
22-Oct-2018 11:17
24-Oct-2018 16:40
26-Oct-2018 02:15
26-Oct-2018 05:54
29-Oct-2018 16:04
22-Oct-2018 17:15

## 2018-10-29 NOTE — PROGRESS NOTE ADULT - SUBJECTIVE AND OBJECTIVE BOX
Follow-up Pulm Progress Note    Appears comfortable.     Medications:  MEDICATIONS  (STANDING):  ALBUTerol/ipratropium for Nebulization 3 milliLiter(s) Nebulizer every 6 hours  allopurinol 300 milliGRAM(s) Oral daily  amLODIPine   Tablet 10 milliGRAM(s) Oral daily  Biotene Dry Mouth Oral Rinse 5 milliLiter(s) Swish and Spit four times a day  cefepime   IVPB      cefepime   IVPB 1000 milliGRAM(s) IV Intermittent every 12 hours  chlorhexidine 4% Liquid 1 Application(s) Topical <User Schedule>  citalopram 40 milliGRAM(s) Oral daily  heparin  Injectable 5000 Unit(s) SubCutaneous every 12 hours  insulin glargine Injectable (LANTUS) 14 Unit(s) SubCutaneous at bedtime  insulin lispro (HumaLOG) corrective regimen sliding scale   SubCutaneous every 6 hours  levETIRAcetam  Solution 750 milliGRAM(s) Oral two times a day  levothyroxine 50 MICROGram(s) Oral daily  metoprolol tartrate 25 milliGRAM(s) Oral two times a day  pantoprazole   Suspension 40 milliGRAM(s) Oral daily  vancomycin  IVPB 1000 milliGRAM(s) IV Intermittent every 24 hours    MEDICATIONS  (PRN):    Vital Signs Last 24 Hrs  T(C): 36.2 (29 Oct 2018 11:12), Max: 37.2 (28 Oct 2018 20:52)  T(F): 97.1 (29 Oct 2018 11:12), Max: 99 (28 Oct 2018 20:52)  HR: 78 (29 Oct 2018 11:12) (74 - 91)  BP: 112/69 (29 Oct 2018 11:12) (112/57 - 137/74)  BP(mean): --  RR: 18 (29 Oct 2018 11:12) (14 - 20)  SpO2: 100% (29 Oct 2018 11:12) (97% - 100%)    10-28 @ 07:01  -  10-29 @ 07:00  --------------------------------------------------------  IN: 1225 mL / OUT: 0 mL / NET: 1225 mL    LABS:                        10.1   9.14  )-----------( 319      ( 29 Oct 2018 09:36 )             32.8     10-29    131<L>  |  96  |  22  ----------------------------<  177<H>  5.4<H>   |  23  |  0.67    Ca    9.1      29 Oct 2018 07:14    CAPILLARY BLOOD GLUCOSE      POCT Blood Glucose.: 106 mg/dL (29 Oct 2018 11:47)    CULTURES: (if applicable)    Culture - Blood (collected 10-20-18 @ 21:41)  Source: .Blood Blood-Peripheral  Final Report (10-25-18 @ 22:01):    No growth at 5 days.    Culture - Blood (collected 10-20-18 @ 14:48)  Source: .Blood Blood-Venous  Final Report (10-25-18 @ 15:00):    No growth at 5 days.    Physical Examination:  PULM: Clear to auscultation bilaterally, no significant sputum production  CVS: S1, S2 heard    RADIOLOGY REVIEWED  CXR:     CT chest:    TTE:

## 2018-10-29 NOTE — CONSULT NOTE ADULT - CONSULT REQUESTED BY NAME
Aleksandr
Bashir Cote M.D.
Dr. Brandon
Dr. Segundo
Dr. Segundo
dr fernandes
dr fernandes
Melinda Segundo

## 2018-10-29 NOTE — CONSULT NOTE ADULT - ASSESSMENT
82yo F with multiple comorbidities and aspiration pna now with incidental R breast mass on CT scan that is not palpable    - would need to discuss with family if they would want further work up of this mass  - can follow up as an outpatient with Dr. Hollis from surgical oncology    discussed with Dr. Hollis  [facility]  900

## 2018-10-29 NOTE — PROVIDER CONTACT NOTE (OTHER) - ACTION/TREATMENT ORDERED:
EKG ordered and done; no new intervention at this time as per NP, continuing of metoprolol 25mg PO
stat labs, ABG done, CXR , EKG, IV Tylenol , Bolus ordered   Pending transfer to Telemetry unit
As per MD place back on bipap will reassess need for bipap
Continue to monitor
Attempted to calm patient without any positive result.
DANILO Michaud aware. EKG to be order. Administer stating order of metoprolol as prescribed. Will continue to monitor.
NP Lay Hatfield aware. NP to bedside. RN to call respiratory to have pt placed on BiPAP as prescribed. AGB labs to be drawn. No additional interventions required. Will continue to monitor.
NP aware, NP Zeta will come to bedside. No further intervention required at this time. Will continue to monitor.
NP aware. Increase fio2 setting to 60% and give albuterol inhalation treatment. Will continue to monitor.
NP aware. No intervention at this time. Will continue to monitor
Will continue to monitor patient.
Zofran given, bipap re-applied. ABG in AM, w/o bipap as per PA. Will continue to monitor patient.

## 2018-10-29 NOTE — PROGRESS NOTE ADULT - SUBJECTIVE AND OBJECTIVE BOX
CC: f/u for multifocal pneumonia    Patient reports: no specific complaints,s/p 10.5 days of antibiotics    REVIEW OF SYSTEMS:  All other review of systems negative (Comprehensive ROS): still dyspneic on high flow nasal oxygen    Antimicrobials Day #  :s/p 10.5 days of vanco and cefepime and 5 days of azithro    Other Medications Reviewed    T(F): 97.1 (10-29-18 @ 11:12), Max: 99 (10-28-18 @ 20:52)  HR: 78 (10-29-18 @ 11:12)  BP: 112/69 (10-29-18 @ 11:12)  RR: 18 (10-29-18 @ 11:12)  SpO2: 100% (10-29-18 @ 11:12)  Wt(kg): --    PHYSICAL EXAM:  General: alert, no acute distress, on high flow nasal oxygen  Eyes:  anicteric, no conjunctival injection, no discharge  Oropharynx: no lesions or injection 	  Neck: supple, without adenopathy  Lungs: clear to auscultation, distant BS  Heart: regular rate and rhythm; no murmur, rubs or gallops  Abdomen: soft, nondistended, nontender, without mass or organomegaly  Skin: no lesions  Extremities: no clubbing, cyanosis, or edema  Neurologic: alert, oriented, moves all extremities  Very debilitated  LAB RESULTS:                        10.1   9.14  )-----------( 319      ( 29 Oct 2018 09:36 )             32.8     10-29    131<L>  |  96  |  22  ----------------------------<  177<H>  5.4<H>   |  23  |  0.67    Ca    9.1      29 Oct 2018 07:14          MICROBIOLOGY:  RECENT CULTURES:      RADIOLOGY REVIEWED:  < from: CT Angio Chest w/ IV Cont (10.24.18 @ 17:08) >  IMPRESSION:   No pulmonary embolism.    Multifocal pneumonia.    Indeterminate right breast nodule, measuring 1.3 cm, for which further   evaluation with dedicated breast imaging is recommended.

## 2018-10-29 NOTE — CONSULT NOTE ADULT - CONSULT REQUESTED DATE/TIME
15-Oct-2018
16-Oct-2018 11:49
20-Oct-2018
20-Oct-2018
20-Oct-2018 13:29
22-Oct-2018 20:15
28-Oct-2018 14:27
29-Oct-2018 17:25

## 2018-10-29 NOTE — PROVIDER CONTACT NOTE (OTHER) - BACKGROUND
admit for pna, on high flow with PEG feedings in place
10/15 - aspiration PNA  10/17- Echo
10/15- SOB, Cough, fevers, dx with aspiration pneumonia
Admitted for sepsis , aspiration pneumonia.
Pt admitted for sepsis and resp failure.
Pt admitted for sepsis and resp. failure.
Pt came inf for sepsis and respiratory failure
Pt. admitted for sepsis, possible asp pna.
admit for pna, sob; on PEG feedings with hx of CVA

## 2018-10-30 LAB
ANION GAP SERPL CALC-SCNC: 11 MMOL/L — SIGNIFICANT CHANGE UP (ref 5–17)
BUN SERPL-MCNC: 24 MG/DL — HIGH (ref 7–23)
CALCIUM SERPL-MCNC: 9.6 MG/DL — SIGNIFICANT CHANGE UP (ref 8.4–10.5)
CHLORIDE SERPL-SCNC: 95 MMOL/L — LOW (ref 96–108)
CO2 SERPL-SCNC: 25 MMOL/L — SIGNIFICANT CHANGE UP (ref 22–31)
CREAT SERPL-MCNC: 0.75 MG/DL — SIGNIFICANT CHANGE UP (ref 0.5–1.3)
GLUCOSE BLDC GLUCOMTR-MCNC: 114 MG/DL — HIGH (ref 70–99)
GLUCOSE BLDC GLUCOMTR-MCNC: 144 MG/DL — HIGH (ref 70–99)
GLUCOSE BLDC GLUCOMTR-MCNC: 168 MG/DL — HIGH (ref 70–99)
GLUCOSE BLDC GLUCOMTR-MCNC: 169 MG/DL — HIGH (ref 70–99)
GLUCOSE SERPL-MCNC: 161 MG/DL — HIGH (ref 70–99)
HCT VFR BLD CALC: 31.5 % — LOW (ref 34.5–45)
HGB BLD-MCNC: 10 G/DL — LOW (ref 11.5–15.5)
MCHC RBC-ENTMCNC: 30.8 PG — SIGNIFICANT CHANGE UP (ref 27–34)
MCHC RBC-ENTMCNC: 31.7 GM/DL — LOW (ref 32–36)
MCV RBC AUTO: 96.9 FL — SIGNIFICANT CHANGE UP (ref 80–100)
PLATELET # BLD AUTO: 317 K/UL — SIGNIFICANT CHANGE UP (ref 150–400)
POTASSIUM SERPL-MCNC: 4.7 MMOL/L — SIGNIFICANT CHANGE UP (ref 3.5–5.3)
POTASSIUM SERPL-SCNC: 4.7 MMOL/L — SIGNIFICANT CHANGE UP (ref 3.5–5.3)
RBC # BLD: 3.25 M/UL — LOW (ref 3.8–5.2)
RBC # FLD: 15.2 % — HIGH (ref 10.3–14.5)
SODIUM SERPL-SCNC: 131 MMOL/L — LOW (ref 135–145)
VANCOMYCIN TROUGH SERPL-MCNC: 16.2 UG/ML — SIGNIFICANT CHANGE UP (ref 10–20)
WBC # BLD: 9.76 K/UL — SIGNIFICANT CHANGE UP (ref 3.8–10.5)
WBC # FLD AUTO: 9.76 K/UL — SIGNIFICANT CHANGE UP (ref 3.8–10.5)

## 2018-10-30 RX ORDER — ACETAMINOPHEN 500 MG
650 TABLET ORAL EVERY 6 HOURS
Qty: 0 | Refills: 0 | Status: DISCONTINUED | OUTPATIENT
Start: 2018-10-30 | End: 2018-11-01

## 2018-10-30 RX ORDER — ACETAMINOPHEN 500 MG
650 TABLET ORAL EVERY 6 HOURS
Qty: 0 | Refills: 0 | Status: DISCONTINUED | OUTPATIENT
Start: 2018-10-30 | End: 2018-10-30

## 2018-10-30 RX ADMIN — PANTOPRAZOLE SODIUM 40 MILLIGRAM(S): 20 TABLET, DELAYED RELEASE ORAL at 14:11

## 2018-10-30 RX ADMIN — CITALOPRAM 40 MILLIGRAM(S): 10 TABLET, FILM COATED ORAL at 14:12

## 2018-10-30 RX ADMIN — INSULIN GLARGINE 14 UNIT(S): 100 INJECTION, SOLUTION SUBCUTANEOUS at 21:31

## 2018-10-30 RX ADMIN — Medication 300 MILLIGRAM(S): at 14:12

## 2018-10-30 RX ADMIN — Medication 650 MILLIGRAM(S): at 21:31

## 2018-10-30 RX ADMIN — Medication 50 MICROGRAM(S): at 05:40

## 2018-10-30 RX ADMIN — LEVETIRACETAM 750 MILLIGRAM(S): 250 TABLET, FILM COATED ORAL at 17:06

## 2018-10-30 RX ADMIN — Medication 25 MILLIGRAM(S): at 05:40

## 2018-10-30 RX ADMIN — Medication 5 MILLILITER(S): at 17:05

## 2018-10-30 RX ADMIN — LEVETIRACETAM 750 MILLIGRAM(S): 250 TABLET, FILM COATED ORAL at 05:40

## 2018-10-30 RX ADMIN — Medication 3 MILLILITER(S): at 01:29

## 2018-10-30 RX ADMIN — Medication 650 MILLIGRAM(S): at 22:05

## 2018-10-30 RX ADMIN — Medication 25 MILLIGRAM(S): at 17:06

## 2018-10-30 RX ADMIN — Medication 3 MILLILITER(S): at 17:05

## 2018-10-30 RX ADMIN — HEPARIN SODIUM 5000 UNIT(S): 5000 INJECTION INTRAVENOUS; SUBCUTANEOUS at 17:05

## 2018-10-30 RX ADMIN — Medication 3 MILLILITER(S): at 14:10

## 2018-10-30 RX ADMIN — AMLODIPINE BESYLATE 10 MILLIGRAM(S): 2.5 TABLET ORAL at 05:40

## 2018-10-30 RX ADMIN — Medication 3 MILLILITER(S): at 05:40

## 2018-10-30 RX ADMIN — CHLORHEXIDINE GLUCONATE 1 APPLICATION(S): 213 SOLUTION TOPICAL at 11:00

## 2018-10-30 RX ADMIN — Medication 650 MILLIGRAM(S): at 14:10

## 2018-10-30 RX ADMIN — HEPARIN SODIUM 5000 UNIT(S): 5000 INJECTION INTRAVENOUS; SUBCUTANEOUS at 05:40

## 2018-10-30 RX ADMIN — Medication 5 MILLILITER(S): at 05:40

## 2018-10-30 RX ADMIN — Medication 5 MILLILITER(S): at 15:05

## 2018-10-30 RX ADMIN — Medication 650 MILLIGRAM(S): at 15:06

## 2018-10-30 NOTE — PROGRESS NOTE ADULT - ASSESSMENT
82 year old female PMH  h/o hemorrhagic CVA, PEG,  HTN, MI,      HLD, gout   p/w SOB and  acute respiratory distress/ bipap  in er/ ,  elevated wbc and  hypokalemia.    probable aspiration pma/  gram negative  pna/on admission,  was on iv  zosyn  dementia  by  history   ct chest ,  multifocal pna   npo/ on peg feedings,  on  synthroid, Kepra , celexa     now  with  MRSA bacteremia/  staph  hemolyticus/ CNS/  s, epidermidis,  mlple  organisms isolated   rpt  blood  c/s, no growth   echo, no vegetations,  normal  ef    on  Cefepime/ Vanco/ completed  on  10/ 28, pe r ID  ,  chronic  aspiration/  pna/  gram negative  dm/  dr gilliam   cta  chest., no PE/  has multifocal pna  right breast  mass, unable  to  do  mammo as  an in pt/   oncology/ dr victoria  saw  pt. needs  op  f/p  with breast  surgeon  and oncology,  and  w/p, when stable from pulm perspective  when oxygen requirements  are less, then will start d/c planning.   now  on hi flow  oxygen  pulm f/p

## 2018-10-30 NOTE — PROGRESS NOTE ADULT - SUBJECTIVE AND OBJECTIVE BOX
no  sob/ on oxygen  REVIEW OF SYSTEMS:  GEN: no fever,    no chills  RESP: no SOB,   no cough  CVS: no chest pain,   no palpitations  GI: no abdominal pain,   no nausea,   no vomiting,   no constipation,   no diarrhea  : no dysuria,   no frequency  NEURO: no headache,   no dizziness  PSYCH: no depression,   not anxious  Derm : no rash    MEDICATIONS  (STANDING):  ALBUTerol/ipratropium for Nebulization 3 milliLiter(s) Nebulizer every 6 hours  allopurinol 300 milliGRAM(s) Oral daily  amLODIPine   Tablet 10 milliGRAM(s) Oral daily  Biotene Dry Mouth Oral Rinse 5 milliLiter(s) Swish and Spit four times a day  chlorhexidine 4% Liquid 1 Application(s) Topical <User Schedule>  citalopram 40 milliGRAM(s) Oral daily  heparin  Injectable 5000 Unit(s) SubCutaneous every 12 hours  insulin glargine Injectable (LANTUS) 14 Unit(s) SubCutaneous at bedtime  insulin lispro (HumaLOG) corrective regimen sliding scale   SubCutaneous every 6 hours  levETIRAcetam  Solution 750 milliGRAM(s) Oral two times a day  levothyroxine 50 MICROGram(s) Oral daily  metoprolol tartrate 25 milliGRAM(s) Oral two times a day  pantoprazole   Suspension 40 milliGRAM(s) Oral daily    MEDICATIONS  (PRN):      Vital Signs Last 24 Hrs  T(C): 36.9 (30 Oct 2018 03:49), Max: 36.9 (30 Oct 2018 03:49)  T(F): 98.4 (30 Oct 2018 03:49), Max: 98.4 (30 Oct 2018 03:49)  HR: 74 (30 Oct 2018 06:55) (74 - 93)  BP: 134/81 (30 Oct 2018 03:49) (102/61 - 134/81)  BP(mean): --  RR: 20 (30 Oct 2018 03:49) (14 - 20)  SpO2: 98% (30 Oct 2018 06:55) (93% - 100%)  CAPILLARY BLOOD GLUCOSE      POCT Blood Glucose.: 169 mg/dL (30 Oct 2018 05:39)  POCT Blood Glucose.: 154 mg/dL (29 Oct 2018 22:38)  POCT Blood Glucose.: 100 mg/dL (29 Oct 2018 17:18)  POCT Blood Glucose.: 106 mg/dL (29 Oct 2018 11:47)    I&O's Summary    29 Oct 2018 07:01  -  30 Oct 2018 07:00  --------------------------------------------------------  IN: 925 mL / OUT: 0 mL / NET: 925 mL        PHYSICAL EXAM:  HEAD:  Atraumatic, Normocephalic  NECK: Supple, No   JVD  CHEST/LUNG:   no     rales,     no,    rhonchi  HEART: Regular rate and rhythm;         murmur  ABDOMEN: Soft, Nontender, ;   EXTREMITIES:   no     edema  NEUROLOGY:  alert    LABS:                        10.1   9.14  )-----------( 319      ( 29 Oct 2018 09:36 )             32.8     10-29    131<L>  |  96  |  22  ----------------------------<  177<H>  5.4<H>   |  23  |  0.67    Ca    9.1      29 Oct 2018 07:14                    Hemoglobin A1C, Whole Blood: 5.6 % (10-01 @ 07:40)    Thyroid Stimulating Hormone, Serum: 1.30 uIU/mL (10-01 @ 07:40)          Consultant(s) Notes Reviewed:      Care Discussed with Consultants/Other Providers:

## 2018-10-30 NOTE — PROGRESS NOTE ADULT - SUBJECTIVE AND OBJECTIVE BOX
CARDIOLOGY     PROGRESS  NOTE   ________________________________________________    CHIEF COMPLAINT:Patient is a 83y old  Female who presents with a chief complaint of sob (30 Oct 2018 07:16)    	  REVIEW OF SYSTEMS:  CONSTITUTIONAL: No fever, weight loss, or fatigue  EYES: No eye pain, visual disturbances, or discharge  ENT:  No difficulty hearing, tinnitus, vertigo; No sinus or throat pain  NECK: No pain or stiffness  RESPIRATORY: No cough, wheezing, chills or hemoptysis; decrease  Shortness of Breath  CARDIOVASCULAR: No chest pain, palpitations, passing out, dizziness, or leg swelling  GASTROINTESTINAL: No abdominal or epigastric pain. No nausea, vomiting, or hematemesis; No diarrhea or constipation. No melena or hematochezia.  GENITOURINARY: No dysuria, frequency, hematuria, or incontinence  NEUROLOGICAL: No headaches, memory loss, loss of strength, numbness, or tremors  SKIN: No itching, burning, rashes, or lesions   LYMPH Nodes: No enlarged glands  ENDOCRINE: No heat or cold intolerance; No hair loss  MUSCULOSKELETAL: No joint pain or swelling; No muscle, back, or extremity pain  PSYCHIATRIC: No depression, anxiety, mood swings, or difficulty sleeping  HEME/LYMPH: No easy bruising, or bleeding gums  ALLERGY AND IMMUNOLOGIC: No hives or eczema	    [ ] All others negative	  [ ] Unable to obtain    PHYSICAL EXAM:  T(C): 36.9 (10-30-18 @ 03:49), Max: 36.9 (10-30-18 @ 03:49)  HR: 74 (10-30-18 @ 06:55) (74 - 93)  BP: 134/81 (10-30-18 @ 03:49) (102/61 - 134/81)  RR: 20 (10-30-18 @ 03:49) (14 - 20)  SpO2: 98% (10-30-18 @ 06:55) (93% - 100%)  Wt(kg): --  I&O's Summary    29 Oct 2018 07:01  -  30 Oct 2018 07:00  --------------------------------------------------------  IN: 925 mL / OUT: 0 mL / NET: 925 mL        Appearance: Normal	  HEENT:   Normal oral mucosa, PERRL, EOMI	  Lymphatic: No lymphadenopathy  Cardiovascular: Normal S1 S2, No JVD, + murmurs, No edema  Respiratory: Lungs clear to auscultation	  Psychiatry: A & O x 3, Mood & affect appropriate  Gastrointestinal:  Soft, Non-tender, + BS	  Skin: No rashes, No ecchymoses, No cyanosis	  Neurologic: Non-focal  Extremities: Normal range of motion, No clubbing, cyanosis or edema  Vascular: Peripheral pulses palpable 2+ bilaterally    MEDICATIONS  (STANDING):  ALBUTerol/ipratropium for Nebulization 3 milliLiter(s) Nebulizer every 6 hours  allopurinol 300 milliGRAM(s) Oral daily  amLODIPine   Tablet 10 milliGRAM(s) Oral daily  Biotene Dry Mouth Oral Rinse 5 milliLiter(s) Swish and Spit four times a day  chlorhexidine 4% Liquid 1 Application(s) Topical <User Schedule>  citalopram 40 milliGRAM(s) Oral daily  heparin  Injectable 5000 Unit(s) SubCutaneous every 12 hours  insulin glargine Injectable (LANTUS) 14 Unit(s) SubCutaneous at bedtime  insulin lispro (HumaLOG) corrective regimen sliding scale   SubCutaneous every 6 hours  levETIRAcetam  Solution 750 milliGRAM(s) Oral two times a day  levothyroxine 50 MICROGram(s) Oral daily  metoprolol tartrate 25 milliGRAM(s) Oral two times a day  pantoprazole   Suspension 40 milliGRAM(s) Oral daily      TELEMETRY: 	    ECG:  	  RADIOLOGY:  OTHER: 	  	  LABS:	 	    CARDIAC MARKERS:                                10.1   9.14  )-----------( 319      ( 29 Oct 2018 09:36 )             32.8     10-30    131<L>  |  95<L>  |  24<H>  ----------------------------<  161<H>  4.7   |  25  |  0.75    Ca    9.6      30 Oct 2018 07:03      proBNP:   Lipid Profile:   HgA1c: Hemoglobin A1C, Whole Blood: 5.6 % (10-01 @ 07:40)    TSH: Thyroid Stimulating Hormone, Serum: 1.30 uIU/mL (10-01 @ 07:40)          Assessment and plan  ---------------------------  83F with aspiration pneumonia  initial sinus tachycardia, HR improved  cont abx  keep O˜I  echo noted  dvt prophylaxis  oob to chair  physical therapy

## 2018-10-30 NOTE — PROGRESS NOTE ADULT - SUBJECTIVE AND OBJECTIVE BOX
Follow-up Pulm Progress Note    Appears comfortable on high flow.     Medications:  MEDICATIONS  (STANDING):  ALBUTerol/ipratropium for Nebulization 3 milliLiter(s) Nebulizer every 6 hours  allopurinol 300 milliGRAM(s) Oral daily  amLODIPine   Tablet 10 milliGRAM(s) Oral daily  Biotene Dry Mouth Oral Rinse 5 milliLiter(s) Swish and Spit four times a day  chlorhexidine 4% Liquid 1 Application(s) Topical <User Schedule>  citalopram 40 milliGRAM(s) Oral daily  heparin  Injectable 5000 Unit(s) SubCutaneous every 12 hours  insulin glargine Injectable (LANTUS) 14 Unit(s) SubCutaneous at bedtime  insulin lispro (HumaLOG) corrective regimen sliding scale   SubCutaneous every 6 hours  levETIRAcetam  Solution 750 milliGRAM(s) Oral two times a day  levothyroxine 50 MICROGram(s) Oral daily  metoprolol tartrate 25 milliGRAM(s) Oral two times a day  pantoprazole   Suspension 40 milliGRAM(s) Oral daily    MEDICATIONS  (PRN):    Vital Signs Last 24 Hrs  T(C): 36.9 (30 Oct 2018 03:49), Max: 36.9 (30 Oct 2018 03:49)  T(F): 98.4 (30 Oct 2018 03:49), Max: 98.4 (30 Oct 2018 03:49)  HR: 82 (30 Oct 2018 09:30) (74 - 93)  BP: 134/81 (30 Oct 2018 03:49) (102/61 - 134/81)  BP(mean): --  RR: 19 (30 Oct 2018 09:35) (18 - 20)  SpO2: 95% (30 Oct 2018 09:35) (93% - 100%)    10-29 @ 07:01  -  10-30 @ 07:00  --------------------------------------------------------  IN: 925 mL / OUT: 0 mL / NET: 925 mL    LABS:                        10.1   9.14  )-----------( 319      ( 29 Oct 2018 09:36 )             32.8     10-30    131<L>  |  95<L>  |  24<H>  ----------------------------<  161<H>  4.7   |  25  |  0.75    Ca    9.6      30 Oct 2018 07:03    CAPILLARY BLOOD GLUCOSE      POCT Blood Glucose.: 169 mg/dL (30 Oct 2018 05:39)    CULTURES: (if applicable)    Culture - Blood (collected 10-20-18 @ 21:41)  Source: .Blood Blood-Peripheral  Final Report (10-25-18 @ 22:01):    No growth at 5 days.    Culture - Blood (collected 10-20-18 @ 14:48)  Source: .Blood Blood-Venous  Final Report (10-25-18 @ 15:00):    No growth at 5 days.    Physical Examination:  PULM: Decreased BS at bases  CVS: S1, S2 heard    RADIOLOGY REVIEWED  CXR:     CT chest:    TTE:

## 2018-10-30 NOTE — PROGRESS NOTE ADULT - ASSESSMENT
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 presents with fever, sob, and what was initially felt to be MRSA bacteremia- PCR +, but in d/w Micro, final report, admission Bld Cxs- no MRSA on plates, growth of 2 strains CoNS- S haemolyticus and S epidermitis; this best explained as gross contamination- all repeat Bld Cxs negative  False + MRSA PCR in setting of overgrowth of CoNS  Multifocal pneumonia primary focus  Remains afebrile, relatively hypoxic, continuous to require high flow  CXR no significant change  Poor resp reserve  suspect a steroid induced leukocytosis which has moderated  No signs of active infection, vanco and cefepime stopped 10/29  Recommendations:  1.observe off antibiotics  2.Supportive care per pulmonary  3.Additional ID w/u as needed.At this point no indication to extend therapy although still tenuous.She is likely an ongoing aspiration risk.

## 2018-10-30 NOTE — PROGRESS NOTE ADULT - PROBLEM SELECTOR PLAN 1
Suspect ongoing hypoxia r/t shunt through bibasilar atelectasis. Almost complete compression of RLL   -Patient is debilitated, unable to get OOB 2nd LLE contraction  -Clinically remains non-toxic appearing   -CTPA negative for PE  -GGO in RUL appears improved from prior CT  -Abx per ID  -TTE is grossly normal  -Wean Hi Manny as tolerated  -Please sit >45 degrees daytime

## 2018-10-30 NOTE — PROGRESS NOTE ADULT - SUBJECTIVE AND OBJECTIVE BOX
CC: f/u for pneumonia    Patient reports; she is lethargic at present, was out to chair earlier.She is on oxygen via face mask    REVIEW OF SYSTEMS:  All other review of systems negative (Comprehensive ROS)    Antimicrobials Day # off :    Other Medications Reviewed    T(F): 97.6 (10-30-18 @ 11:29), Max: 98.4 (10-30-18 @ 03:49)  HR: 76 (10-30-18 @ 11:29)  BP: 136/82 (10-30-18 @ 11:29)  RR: 18 (10-30-18 @ 11:29)  SpO2: 98% (10-30-18 @ 11:29)  Wt(kg): --    PHYSICAL EXAM:  General: lethargic, no acute distress  Eyes:  anicteric, no conjunctival injection, no discharge  Oropharynx: no lesions or injection 	  Neck: supple, without adenopathy  Lungs: clear to auscultation, diminished at bases  Heart: regular rate and rhythm; no murmur, rubs or gallops  Abdomen: soft, nondistended, nontender, without mass or organomegaly, peg  Skin: no lesions  Extremities: no clubbing, cyanosis, + edema  Neurologic: poorly interactive at present  LAB RESULTS:                        10.0   9.76  )-----------( 317      ( 30 Oct 2018 09:51 )             31.5     10-30    131<L>  |  95<L>  |  24<H>  ----------------------------<  161<H>  4.7   |  25  |  0.75    Ca    9.6      30 Oct 2018 07:03          MICROBIOLOGY:  RECENT CULTURES:      RADIOLOGY REVIEWED:

## 2018-10-31 LAB
ANION GAP SERPL CALC-SCNC: 13 MMOL/L — SIGNIFICANT CHANGE UP (ref 5–17)
BUN SERPL-MCNC: 33 MG/DL — HIGH (ref 7–23)
CALCIUM SERPL-MCNC: 9.9 MG/DL — SIGNIFICANT CHANGE UP (ref 8.4–10.5)
CHLORIDE SERPL-SCNC: 96 MMOL/L — SIGNIFICANT CHANGE UP (ref 96–108)
CO2 SERPL-SCNC: 24 MMOL/L — SIGNIFICANT CHANGE UP (ref 22–31)
CREAT SERPL-MCNC: 0.77 MG/DL — SIGNIFICANT CHANGE UP (ref 0.5–1.3)
GLUCOSE BLDC GLUCOMTR-MCNC: 129 MG/DL — HIGH (ref 70–99)
GLUCOSE BLDC GLUCOMTR-MCNC: 139 MG/DL — HIGH (ref 70–99)
GLUCOSE BLDC GLUCOMTR-MCNC: 144 MG/DL — HIGH (ref 70–99)
GLUCOSE BLDC GLUCOMTR-MCNC: 144 MG/DL — HIGH (ref 70–99)
GLUCOSE BLDC GLUCOMTR-MCNC: 148 MG/DL — HIGH (ref 70–99)
GLUCOSE BLDC GLUCOMTR-MCNC: 151 MG/DL — HIGH (ref 70–99)
GLUCOSE SERPL-MCNC: 140 MG/DL — HIGH (ref 70–99)
HCT VFR BLD CALC: 31.8 % — LOW (ref 34.5–45)
HGB BLD-MCNC: 10.2 G/DL — LOW (ref 11.5–15.5)
MCHC RBC-ENTMCNC: 31.8 PG — SIGNIFICANT CHANGE UP (ref 27–34)
MCHC RBC-ENTMCNC: 32.1 GM/DL — SIGNIFICANT CHANGE UP (ref 32–36)
MCV RBC AUTO: 99.1 FL — SIGNIFICANT CHANGE UP (ref 80–100)
PLATELET # BLD AUTO: 308 K/UL — SIGNIFICANT CHANGE UP (ref 150–400)
POTASSIUM SERPL-MCNC: 4.8 MMOL/L — SIGNIFICANT CHANGE UP (ref 3.5–5.3)
POTASSIUM SERPL-SCNC: 4.8 MMOL/L — SIGNIFICANT CHANGE UP (ref 3.5–5.3)
RBC # BLD: 3.21 M/UL — LOW (ref 3.8–5.2)
RBC # FLD: 15.3 % — HIGH (ref 10.3–14.5)
SODIUM SERPL-SCNC: 133 MMOL/L — LOW (ref 135–145)
WBC # BLD: 8.25 K/UL — SIGNIFICANT CHANGE UP (ref 3.8–10.5)
WBC # FLD AUTO: 8.25 K/UL — SIGNIFICANT CHANGE UP (ref 3.8–10.5)

## 2018-10-31 RX ADMIN — HEPARIN SODIUM 5000 UNIT(S): 5000 INJECTION INTRAVENOUS; SUBCUTANEOUS at 05:41

## 2018-10-31 RX ADMIN — Medication 50 MICROGRAM(S): at 05:40

## 2018-10-31 RX ADMIN — HEPARIN SODIUM 5000 UNIT(S): 5000 INJECTION INTRAVENOUS; SUBCUTANEOUS at 17:20

## 2018-10-31 RX ADMIN — CHLORHEXIDINE GLUCONATE 1 APPLICATION(S): 213 SOLUTION TOPICAL at 08:41

## 2018-10-31 RX ADMIN — Medication 650 MILLIGRAM(S): at 05:41

## 2018-10-31 RX ADMIN — Medication 5 MILLILITER(S): at 23:10

## 2018-10-31 RX ADMIN — CITALOPRAM 40 MILLIGRAM(S): 10 TABLET, FILM COATED ORAL at 12:13

## 2018-10-31 RX ADMIN — Medication 650 MILLIGRAM(S): at 06:22

## 2018-10-31 RX ADMIN — LEVETIRACETAM 750 MILLIGRAM(S): 250 TABLET, FILM COATED ORAL at 17:20

## 2018-10-31 RX ADMIN — INSULIN GLARGINE 14 UNIT(S): 100 INJECTION, SOLUTION SUBCUTANEOUS at 23:09

## 2018-10-31 RX ADMIN — AMLODIPINE BESYLATE 10 MILLIGRAM(S): 2.5 TABLET ORAL at 05:40

## 2018-10-31 RX ADMIN — LEVETIRACETAM 750 MILLIGRAM(S): 250 TABLET, FILM COATED ORAL at 05:41

## 2018-10-31 RX ADMIN — Medication 5 MILLILITER(S): at 00:03

## 2018-10-31 RX ADMIN — Medication 3 MILLILITER(S): at 17:20

## 2018-10-31 RX ADMIN — Medication 25 MILLIGRAM(S): at 05:43

## 2018-10-31 RX ADMIN — Medication 25 MILLIGRAM(S): at 17:20

## 2018-10-31 RX ADMIN — Medication 5 MILLILITER(S): at 11:59

## 2018-10-31 RX ADMIN — Medication 3 MILLILITER(S): at 05:39

## 2018-10-31 RX ADMIN — Medication 3 MILLILITER(S): at 23:10

## 2018-10-31 RX ADMIN — Medication 3 MILLILITER(S): at 11:59

## 2018-10-31 RX ADMIN — Medication 5 MILLILITER(S): at 05:39

## 2018-10-31 RX ADMIN — Medication 300 MILLIGRAM(S): at 12:13

## 2018-10-31 RX ADMIN — Medication 5 MILLILITER(S): at 17:20

## 2018-10-31 RX ADMIN — PANTOPRAZOLE SODIUM 40 MILLIGRAM(S): 20 TABLET, DELAYED RELEASE ORAL at 12:13

## 2018-10-31 RX ADMIN — Medication 3 MILLILITER(S): at 00:02

## 2018-10-31 NOTE — PROGRESS NOTE ADULT - SUBJECTIVE AND OBJECTIVE BOX
Chief Complaint: 84 y/o Prolonged hospital stay with susp. PNA noted with hyperglycemia 309 mg/dL on am labs.    Patient with improving infection, not given steroids again.  On Peg feeds currently on Jevity 55 cc/h for 22 hours, tolerating feeds well.  Lantus insulin 14 units at bedtime, last 36 hours -150 mg/dL.  Plans to discharge to rehab today.    MEDICATIONS  (STANDING):  ALBUTerol/ipratropium for Nebulization 3 milliLiter(s) Nebulizer every 6 hours  allopurinol 300 milliGRAM(s) Oral daily  amLODIPine   Tablet 10 milliGRAM(s) Oral daily  Biotene Dry Mouth Oral Rinse 5 milliLiter(s) Swish and Spit four times a day  chlorhexidine 4% Liquid 1 Application(s) Topical <User Schedule>  citalopram 40 milliGRAM(s) Oral daily  heparin  Injectable 5000 Unit(s) SubCutaneous every 12 hours  insulin glargine Injectable (LANTUS) 14 Unit(s) SubCutaneous at bedtime  insulin lispro (HumaLOG) corrective regimen sliding scale   SubCutaneous every 6 hours  levETIRAcetam  Solution 750 milliGRAM(s) Oral two times a day  levothyroxine 50 MICROGram(s) Oral daily  metoprolol tartrate 25 milliGRAM(s) Oral two times a day  pantoprazole   Suspension 40 milliGRAM(s) Oral daily    MEDICATIONS  (PRN):  acetaminophen    Suspension .. 650 milliGRAM(s) Enteral Tube every 6 hours PRN Moderate Pain (4 - 6)      Allergies    Toradol (Urticaria (Mild to Mod); Rash (Mild to Mod))    Intolerances        PHYSICAL EXAM:  VITALS: T(C): 36.5 (10-31-18 @ 13:28)  T(F): 97.7 (10-31-18 @ 13:28), Max: 98.1 (10-31-18 @ 04:18)  HR: 88 (10-31-18 @ 13:52) (72 - 88)  BP: 148/77 (10-31-18 @ 13:52) (112/57 - 148/77)  RR:  (17 - 18)  SpO2:  (94% - 100%)  GENERAL: NAD, peg feeds, O2 NC,   EYES: No proptosis, no lid lag, anicteric  HEENT:  Atraumatic, Normocephalic, moist mucous membranes  THYROID: Normal size, no palpable nodules  RESPIRATORY: Clear to auscultation bilaterally; No rales, rhonchi, wheezing  CARDIOVASCULAR: Regular rate and rhythm; No murmurs; peripheral edema  GI: Soft, nontender, non distended, peg placed, normal bowel sounds  SKIN: Dry, intact, No rashes or lesions  MUSCULOSKELETAL: Full range of motion, normal strength  NEURO: no focal deficit.  PSYCH: Alert but confused      POCT Blood Glucose.: 129 mg/dL (10-31-18 @ 16:36)  POCT Blood Glucose.: 144 mg/dL (10-31-18 @ 12:07)  POCT Blood Glucose.: 144 mg/dL (10-31-18 @ 06:01)  POCT Blood Glucose.: 139 mg/dL (10-31-18 @ 00:11)  POCT Blood Glucose.: 144 mg/dL (10-30-18 @ 21:22)  POCT Blood Glucose.: 168 mg/dL (10-30-18 @ 16:30)  POCT Blood Glucose.: 114 mg/dL (10-30-18 @ 11:43)  POCT Blood Glucose.: 169 mg/dL (10-30-18 @ 05:39)  POCT Blood Glucose.: 154 mg/dL (10-29-18 @ 22:38)  POCT Blood Glucose.: 100 mg/dL (10-29-18 @ 17:18)  POCT Blood Glucose.: 106 mg/dL (10-29-18 @ 11:47)  POCT Blood Glucose.: 202 mg/dL (10-29-18 @ 06:39)  POCT Blood Glucose.: 177 mg/dL (10-29-18 @ 00:52)  POCT Blood Glucose.: 142 mg/dL (10-28-18 @ 22:32)  POCT Blood Glucose.: 155 mg/dL (10-28-18 @ 18:00)                            10.2   8.25  )-----------( 308      ( 31 Oct 2018 07:56 )             31.8       10-31    133<L>  |  96  |  33<H>  ----------------------------<  140<H>  4.8   |  24  |  0.77    EGFR if : 84  EGFR if non : 72    Ca    9.9      10-31        Thyroid Function Tests:      Hemoglobin A1C, Whole Blood: 5.6 % [4.0 - 5.6] (10-01-18 @ 07:40)  Hemoglobin A1C, Whole Blood: 5.4 % [4.0 - 5.6] (09-03-18 @ 10:11)  Hemoglobin A1C, Whole Blood: 5.5 % [4.0 - 5.6] (08-06-18 @ 07:48)

## 2018-10-31 NOTE — PROGRESS NOTE ADULT - ASSESSMENT
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 CT chest with multifocal PNA. She had RRT on 10/20 for hypoxic respiratory distress, with high fi02 requirements. Hypoxia likely 2nd shunt throughout bibasilar atelectasis

## 2018-10-31 NOTE — PROGRESS NOTE ADULT - ATTENDING COMMENTS
full code at present  David for support  f/u ID  cont abx  wean fi02 as possible
lower FiO2 as able  try to get out of bed
trial of 5L
Bilevel at night  OOB to chair please  incentive spirometer  d/w NP
as above
as above  for CTA
lower FiO2 as able  try to get out of bed
CTA of chest if no improvement in oxygenation by tomorrow  cont rest of care as written
as above

## 2018-10-31 NOTE — PROGRESS NOTE ADULT - SUBJECTIVE AND OBJECTIVE BOX
CC: f/u for pneomonia    Patient reports: she is breathing better.She is asking to go home.No cough or complaints    REVIEW OF SYSTEMS:  All other review of systems negative (Comprehensive ROS)    Antimicrobials Day #  :off    Other Medications Reviewed    T(F): 97.7 (10-31-18 @ 13:28), Max: 98.1 (10-31-18 @ 04:18)  HR: 88 (10-31-18 @ 13:52)  BP: 148/77 (10-31-18 @ 13:52)  RR: 17 (10-31-18 @ 13:28)  SpO2: 97% (10-31-18 @ 13:52)  Wt(kg): --    PHYSICAL EXAM:  General: alert, no acute distress  Eyes:  anicteric, no conjunctival injection, no discharge  Oropharynx: no lesions or injection 	  Neck: supple, without adenopathy  Lungs: clear to auscultation, distant BS  Heart: regular rate and rhythm; no murmur, rubs or gallops  Abdomen: soft, nondistended, nontender, without mass or organomegaly  Skin: no lesions  Extremities: no clubbing, cyanosis, trace edema  Neurologic: alert, baseline confusion, moves all extremities    LAB RESULTS:                        10.2   8.25  )-----------( 308      ( 31 Oct 2018 07:56 )             31.8     10-31    133<L>  |  96  |  33<H>  ----------------------------<  140<H>  4.8   |  24  |  0.77    Ca    9.9      31 Oct 2018 06:01          MICROBIOLOGY:  RECENT CULTURES:      RADIOLOGY REVIEWED:

## 2018-10-31 NOTE — CHART NOTE - NSCHARTNOTEFT_GEN_A_CORE
Nutrition Follow Up Note  Patient seen for: nutrition follow up on 4MON    Chart reviewed, events noted. Pt is 83yoF admitted with PNA. PMH significant for dementia, hemorrhagic CVA, HTN, MI, HLD, gout. Pt with R breast mass, to follow up as outpatient. Pt noted with hyperglycemia on current admit, likely related to large steroid dose- now off steroids. No h/o DM. Last HgbA1c 5.6%. Improved BG control ntoed on follow up. Pt noted with hyponatremia 10/31, improved from yesterday. Promote adequate hydration.    Source: RN, electronic medical record, pt    Diet : NPO with TF    Patient reports: pt noted with disorientation and forgetful at times per chart, however reports no c/o nausea or vomiting, abdominal discomfort or distention at this time. Per RN, no acute GI distress to note. Last BMs 10/30. Pt tolerating TF well.     PO intake : n/a     Source for PO intake: n/a     Enteral /Parenteral Nutrition: Current TF order is Jevity 1.2 x 22 hours, total volume 1,210 ml/24 hours. Current regimen provides 1,452 kcal (18.6kcal/Kg based on current wt 78Kg), 67.2 g pro (1.3g/Kg based on IBW 52.3Kg) and 976ml free H2O. + 2 Fady daily (to provide 160kcal, 28g protein). Total being provided: 1,612 kcal (20.7kcal/Kg based on current wt 78Kg) and 95.2g pro (1.8g/Kg based on IBW 52.3Kg).  Recommend continue current TF regimen and continue to monitor.    Daily Weight in k.1 (10-). Pt noted with decreased wt since admit, however likely dosing wt was not accurate. Even so, pt with ~6.2Kg (7.4%) wt loss x 9 days likely in setting of fluid shifts and "permissive underfeeding" of ~21 kcal/Kg with pt BMI >30.   78.9 (10-28)  84 (10-24)  84.3 (10-23)  92.7Kg dosing (10-15)    Pertinent Medications: MEDICATIONS  (STANDING):  ALBUTerol/ipratropium for Nebulization 3 milliLiter(s) Nebulizer every 6 hours  allopurinol 300 milliGRAM(s) Oral daily  amLODIPine   Tablet 10 milliGRAM(s) Oral daily  Biotene Dry Mouth Oral Rinse 5 milliLiter(s) Swish and Spit four times a day  chlorhexidine 4% Liquid 1 Application(s) Topical <User Schedule>  citalopram 40 milliGRAM(s) Oral daily  heparin  Injectable 5000 Unit(s) SubCutaneous every 12 hours  insulin glargine Injectable (LANTUS) 14 Unit(s) SubCutaneous at bedtime  insulin lispro (HumaLOG) corrective regimen sliding scale   SubCutaneous every 6 hours  levETIRAcetam  Solution 750 milliGRAM(s) Oral two times a day  levothyroxine 50 MICROGram(s) Oral daily  metoprolol tartrate 25 milliGRAM(s) Oral two times a day  pantoprazole   Suspension 40 milliGRAM(s) Oral daily    MEDICATIONS  (PRN):  acetaminophen    Suspension .. 650 milliGRAM(s) Enteral Tube every 6 hours PRN Moderate Pain (4 - 6)    Pertinent Labs: 10-31 @ 06:01: Na 133<L>, BUN 33<H>, Cr 0.77, <H>, K+ 4.8, Phos --, Mg --, Alk Phos --, ALT/SGPT --, AST/SGOT --, HbA1c --    Finger Sticks:  POCT Blood Glucose.: 144 mg/dL (10-31 @ 06:01)  POCT Blood Glucose.: 139 mg/dL (10-31 @ 00:11)  POCT Blood Glucose.: 144 mg/dL (10-30 @ 21:22)  POCT Blood Glucose.: 168 mg/dL (10-30 @ 16:30)  POCT Blood Glucose.: 114 mg/dL (10-30 @ 11:43)      Skin per nursing documentation: stage II pressure injury to L lateral cuboid noted  Edema: 1+ generalized edema, 2+ edema to R/L legs per nursing documentation    Estimated Needs:   [ ] no change since previous assessment  [x] recalculated:   1,560-1,950 kcal based on 20-25 kcal/Kg using current wt 78Kg (10/31)  73-79 g pro based on 1.4-1.5 g/Kg using IBW 52.3Kg    Previous Nutrition Diagnosis: Increased nutrient needs; protein. Being addressed with EN.  Nutrition Diagnosis is: ongoing    New Nutrition Diagnosis: n/a  Related to:    As evidenced by:      Interventions:     Recommend  1) Recommend continue current TF order Jevity 1.2 @ 55ml/hr x 22 hrs. Continue to monitor wt and tolerance. Consider increase of TF if wt loss becomes excessive.  2) Monitor total volume received per 24, 48, 72 hours to ensure pt is actually receiving full TF recommendations.  3) Recommend add 2 Fady via PEG to provide an additional 160 Kcal and 28 Gm amino acids  4) Obtain wt daily, monitor    Monitoring and Evaluation:     Continue to monitor Nutritional intake, Tolerance to diet prescription, weights, labs, skin integrity    RD remains available upon request and will follow up per protocol. Di Lee RD, Pager: [phone] Nutrition Follow Up Note  Patient seen for: nutrition follow up on 4MON    Chart reviewed, events noted. Pt is 83yoF admitted with PNA. PMH significant for dementia, hemorrhagic CVA, HTN, MI, HLD, gout. Pt with R breast mass, to follow up as outpatient. Pt noted with hyperglycemia on current admit, likely related to large steroid dose- now off steroids. No h/o DM. Last HgbA1c 5.6%. Improved BG control ntoed on follow up. Pt noted with hyponatremia 10/31, improved from yesterday. Promote adequate hydration.    Source: RN, electronic medical record, pt    Diet : NPO with TF    Patient reports: pt noted with disorientation and forgetful at times per chart, however reports no c/o nausea or vomiting, abdominal discomfort or distention at this time. Per RN, no acute GI distress to note. Last BMs 10/30. Pt tolerating TF well.     PO intake : n/a     Source for PO intake: n/a     Enteral /Parenteral Nutrition: TF observed running at goal. Current TF order is Jevity 1.2 x 22 hours, total volume 1,210 ml/24 hours. Current regimen provides 1,452 kcal (18.6kcal/Kg based on current wt 78Kg), 67.2 g pro (1.3g/Kg based on IBW 52.3Kg) and 976ml free H2O. + 2 Fady daily (to provide 160kcal, 28g protein). Total being provided: 1,612 kcal (20.7kcal/Kg based on current wt 78Kg) and 95.2g pro (1.8g/Kg based on IBW 52.3Kg).  Recommend continue current TF regimen and continue to monitor.    Daily Weight in k.1 (10-). Pt noted with decreased wt since admit, however likely dosing wt was not accurate. Even so, pt with ~6.2Kg (7.4%) wt loss x 9 days likely in setting of fluid shifts and "permissive underfeeding" of ~21 kcal/Kg with pt BMI >30.   78.9 (10-28)  84 (10-24)  84.3 (10-23)  92.7Kg dosing (10-15)    Pertinent Medications: MEDICATIONS  (STANDING):  ALBUTerol/ipratropium for Nebulization 3 milliLiter(s) Nebulizer every 6 hours  allopurinol 300 milliGRAM(s) Oral daily  amLODIPine   Tablet 10 milliGRAM(s) Oral daily  Biotene Dry Mouth Oral Rinse 5 milliLiter(s) Swish and Spit four times a day  chlorhexidine 4% Liquid 1 Application(s) Topical <User Schedule>  citalopram 40 milliGRAM(s) Oral daily  heparin  Injectable 5000 Unit(s) SubCutaneous every 12 hours  insulin glargine Injectable (LANTUS) 14 Unit(s) SubCutaneous at bedtime  insulin lispro (HumaLOG) corrective regimen sliding scale   SubCutaneous every 6 hours  levETIRAcetam  Solution 750 milliGRAM(s) Oral two times a day  levothyroxine 50 MICROGram(s) Oral daily  metoprolol tartrate 25 milliGRAM(s) Oral two times a day  pantoprazole   Suspension 40 milliGRAM(s) Oral daily    MEDICATIONS  (PRN):  acetaminophen    Suspension .. 650 milliGRAM(s) Enteral Tube every 6 hours PRN Moderate Pain (4 - 6)    Pertinent Labs: 10-31 @ 06:01: Na 133<L>, BUN 33<H>, Cr 0.77, <H>, K+ 4.8, Phos --, Mg --, Alk Phos --, ALT/SGPT --, AST/SGOT --, HbA1c --    Finger Sticks:  POCT Blood Glucose.: 144 mg/dL (10-31 @ 06:01)  POCT Blood Glucose.: 139 mg/dL (10-31 @ 00:11)  POCT Blood Glucose.: 144 mg/dL (10-30 @ 21:22)  POCT Blood Glucose.: 168 mg/dL (10-30 @ 16:30)  POCT Blood Glucose.: 114 mg/dL (10-30 @ 11:43)      Skin per nursing documentation: stage II pressure injury to L lateral cuboid noted  Edema: 1+ generalized edema, 2+ edema to R/L legs per nursing documentation    Estimated Needs:   [ ] no change since previous assessment  [x] recalculated:   1,560-1,950 kcal based on 20-25 kcal/Kg using current wt 78Kg (10/31)  73-79 g pro based on 1.4-1.5 g/Kg using IBW 52.3Kg    Previous Nutrition Diagnosis: Increased nutrient needs; protein. Being addressed with EN.  Nutrition Diagnosis is: ongoing    New Nutrition Diagnosis: n/a  Related to:    As evidenced by:      Interventions:     Recommend  1) Recommend continue current TF order Jevity 1.2 @ 55ml/hr x 22 hrs. Continue to monitor wt and tolerance. Consider increase of TF if wt loss becomes excessive.  2) Monitor total volume received per 24, 48, 72 hours to ensure pt is actually receiving full TF recommendations.  3) Recommend add 2 Fady via PEG to provide an additional 160 Kcal and 28 Gm amino acids  4) Obtain wt daily, monitor    Monitoring and Evaluation:     Continue to monitor Nutritional intake, Tolerance to diet prescription, weights, labs, skin integrity    RD remains available upon request and will follow up per protocol. Di Lee RD, Pager: [phone] Nutrition Follow Up Note  Patient seen for: nutrition follow up on 4MON    Chart reviewed, events noted. Pt is 83yoF admitted with PNA. PMH significant for dementia, hemorrhagic CVA, HTN, MI, HLD, gout. Pt with R breast mass, to follow up as outpatient. Pt noted with hyperglycemia on current admit, likely related to large steroid dose- now off steroids. No h/o DM. Last HgbA1c 5.6%. Improved BG control noted on follow up. Pt noted with hyponatremia 10/31, improved from yesterday. Promote adequate hydration.    Source: RN, electronic medical record, pt    Diet : NPO with TF    Patient reports: pt noted with disorientation and forgetful at times per chart, however reports no c/o nausea or vomiting, abdominal discomfort or distention at this time. Per RN, no acute GI distress to note. Last BMs 10/30. Pt tolerating TF well.     PO intake : n/a     Source for PO intake: n/a     Enteral /Parenteral Nutrition: TF observed running at goal. Current TF order is Jevity 1.2 x 22 hours, total volume 1,210 ml/24 hours. Current regimen provides 1,452 kcal (18.6kcal/Kg based on current wt 78Kg), 67.2 g pro (1.3g/Kg based on IBW 52.3Kg) and 976ml free H2O. + 2 Fady daily (to provide 160kcal, 28g protein). Total being provided: 1,612 kcal (20.7kcal/Kg based on current wt 78Kg) and 95.2g pro (1.8g/Kg based on IBW 52.3Kg).  Recommend continue current TF regimen and continue to monitor.    Daily Weight in k.1 (10-31). Pt noted with decreased wt since admit, however likely dosing wt was not accurate. Even so, pt with ~6.2Kg (7.4%) wt loss x 9 days likely in setting of fluid shifts and "permissive underfeeding" of ~21 kcal/Kg with pt BMI >30.   78.9 (10-28)  84 (10-24)  84.3 (10-23)  92.7Kg dosing (10-15)    Pertinent Medications: MEDICATIONS  (STANDING):  ALBUTerol/ipratropium for Nebulization 3 milliLiter(s) Nebulizer every 6 hours  allopurinol 300 milliGRAM(s) Oral daily  amLODIPine   Tablet 10 milliGRAM(s) Oral daily  Biotene Dry Mouth Oral Rinse 5 milliLiter(s) Swish and Spit four times a day  chlorhexidine 4% Liquid 1 Application(s) Topical <User Schedule>  citalopram 40 milliGRAM(s) Oral daily  heparin  Injectable 5000 Unit(s) SubCutaneous every 12 hours  insulin glargine Injectable (LANTUS) 14 Unit(s) SubCutaneous at bedtime  insulin lispro (HumaLOG) corrective regimen sliding scale   SubCutaneous every 6 hours  levETIRAcetam  Solution 750 milliGRAM(s) Oral two times a day  levothyroxine 50 MICROGram(s) Oral daily  metoprolol tartrate 25 milliGRAM(s) Oral two times a day  pantoprazole   Suspension 40 milliGRAM(s) Oral daily    MEDICATIONS  (PRN):  acetaminophen    Suspension .. 650 milliGRAM(s) Enteral Tube every 6 hours PRN Moderate Pain (4 - 6)    Pertinent Labs: 10-31 @ 06:01: Na 133<L>, BUN 33<H>, Cr 0.77, <H>, K+ 4.8, Phos --, Mg --, Alk Phos --, ALT/SGPT --, AST/SGOT --, HbA1c --    Finger Sticks:  POCT Blood Glucose.: 144 mg/dL (10-31 @ 06:01)  POCT Blood Glucose.: 139 mg/dL (10-31 @ 00:11)  POCT Blood Glucose.: 144 mg/dL (10-30 @ 21:22)  POCT Blood Glucose.: 168 mg/dL (10-30 @ 16:30)  POCT Blood Glucose.: 114 mg/dL (10-30 @ 11:43)      Skin per nursing documentation: stage II pressure injury to L lateral cuboid noted  Edema: 1+ generalized edema, 2+ edema to R/L legs per nursing documentation    Estimated Needs:   [ ] no change since previous assessment  [x] recalculated:   1,560-1,950 kcal based on 20-25 kcal/Kg using current wt 78Kg (10/31)  73-79 g pro based on 1.4-1.5 g/Kg using IBW 52.3Kg    Previous Nutrition Diagnosis: Increased nutrient needs; protein. Being addressed with EN.  Nutrition Diagnosis is: ongoing    New Nutrition Diagnosis: n/a  Related to:    As evidenced by:      Interventions:     Recommend  1) Recommend continue current TF order Jevity 1.2 @ 55ml/hr x 22 hrs. Continue to monitor wt and tolerance. Consider increase of TF if wt loss becomes excessive.  2) Monitor total volume received per 24, 48, 72 hours to ensure pt is actually receiving full TF recommendations.  3) Recommend add 2 Fady via PEG to provide an additional 160 Kcal and 28 Gm amino acids  4) Obtain wt daily, monitor    Monitoring and Evaluation:     Continue to monitor Nutritional intake, Tolerance to diet prescription, weights, labs, skin integrity    RD remains available upon request and will follow up per protocol. Di Lee RD, Pager: [phone]

## 2018-10-31 NOTE — PROGRESS NOTE ADULT - SUBJECTIVE AND OBJECTIVE BOX
Follow-up Pulm Progress Note    No new respiratory events overnight  97% on 4L NC    Medications:  MEDICATIONS  (STANDING):  ALBUTerol/ipratropium for Nebulization 3 milliLiter(s) Nebulizer every 6 hours  allopurinol 300 milliGRAM(s) Oral daily  amLODIPine   Tablet 10 milliGRAM(s) Oral daily  Biotene Dry Mouth Oral Rinse 5 milliLiter(s) Swish and Spit four times a day  chlorhexidine 4% Liquid 1 Application(s) Topical <User Schedule>  citalopram 40 milliGRAM(s) Oral daily  heparin  Injectable 5000 Unit(s) SubCutaneous every 12 hours  insulin glargine Injectable (LANTUS) 14 Unit(s) SubCutaneous at bedtime  insulin lispro (HumaLOG) corrective regimen sliding scale   SubCutaneous every 6 hours  levETIRAcetam  Solution 750 milliGRAM(s) Oral two times a day  levothyroxine 50 MICROGram(s) Oral daily  metoprolol tartrate 25 milliGRAM(s) Oral two times a day  pantoprazole   Suspension 40 milliGRAM(s) Oral daily    MEDICATIONS  (PRN):  acetaminophen    Suspension .. 650 milliGRAM(s) Enteral Tube every 6 hours PRN Moderate Pain (4 - 6)          Vital Signs Last 24 Hrs  T(C): 36.6 (31 Oct 2018 08:39), Max: 36.7 (31 Oct 2018 04:18)  T(F): 97.9 (31 Oct 2018 08:39), Max: 98.1 (31 Oct 2018 04:18)  HR: 75 (31 Oct 2018 08:39) (72 - 81)  BP: 112/57 (31 Oct 2018 08:39) (112/57 - 130/75)  BP(mean): --  RR: 18 (31 Oct 2018 08:39) (18 - 18)  SpO2: 94% (31 Oct 2018 08:39) (94% - 100%) on 4L NC          10-30 @ 07:01  -  10-31 @ 07:00  --------------------------------------------------------  IN: 1120 mL / OUT: 0 mL / NET: 1120 mL          LABS:                        10.2   8.25  )-----------( 308      ( 31 Oct 2018 07:56 )             31.8     10-31    133<L>  |  96  |  33<H>  ----------------------------<  140<H>  4.8   |  24  |  0.77    Ca    9.9      31 Oct 2018 06:01            CAPILLARY BLOOD GLUCOSE      POCT Blood Glucose.: 144 mg/dL (31 Oct 2018 06:01)            Physical Examination:  PULM: Decreased BS throughout   CVS: S1, S2 heard    RADIOLOGY REVIEWED Follow-up Pulm Progress Note    No new respiratory events overnight  97% on 4L NC    Medications:  MEDICATIONS  (STANDING):  ALBUTerol/ipratropium for Nebulization 3 milliLiter(s) Nebulizer every 6 hours  allopurinol 300 milliGRAM(s) Oral daily  amLODIPine   Tablet 10 milliGRAM(s) Oral daily  Biotene Dry Mouth Oral Rinse 5 milliLiter(s) Swish and Spit four times a day  chlorhexidine 4% Liquid 1 Application(s) Topical <User Schedule>  citalopram 40 milliGRAM(s) Oral daily  heparin  Injectable 5000 Unit(s) SubCutaneous every 12 hours  insulin glargine Injectable (LANTUS) 14 Unit(s) SubCutaneous at bedtime  insulin lispro (HumaLOG) corrective regimen sliding scale   SubCutaneous every 6 hours  levETIRAcetam  Solution 750 milliGRAM(s) Oral two times a day  levothyroxine 50 MICROGram(s) Oral daily  metoprolol tartrate 25 milliGRAM(s) Oral two times a day  pantoprazole   Suspension 40 milliGRAM(s) Oral daily    MEDICATIONS  (PRN):  acetaminophen    Suspension .. 650 milliGRAM(s) Enteral Tube every 6 hours PRN Moderate Pain (4 - 6)    Vital Signs Last 24 Hrs  T(C): 36.6 (31 Oct 2018 08:39), Max: 36.7 (31 Oct 2018 04:18)  T(F): 97.9 (31 Oct 2018 08:39), Max: 98.1 (31 Oct 2018 04:18)  HR: 75 (31 Oct 2018 08:39) (72 - 81)  BP: 112/57 (31 Oct 2018 08:39) (112/57 - 130/75)  BP(mean): --  RR: 18 (31 Oct 2018 08:39) (18 - 18)  SpO2: 94% (31 Oct 2018 08:39) (94% - 100%) on 4L NC    10-30 @ 07:01  -  10-31 @ 07:00  --------------------------------------------------------  IN: 1120 mL / OUT: 0 mL / NET: 1120 mL    LABS:                        10.2   8.25  )-----------( 308      ( 31 Oct 2018 07:56 )             31.8     10-31    133<L>  |  96  |  33<H>  ----------------------------<  140<H>  4.8   |  24  |  0.77    Ca    9.9      31 Oct 2018 06:01    CAPILLARY BLOOD GLUCOSE      POCT Blood Glucose.: 144 mg/dL (31 Oct 2018 06:01)    Physical Examination:  PULM: Decreased BS throughout   CVS: S1, S2 heard    RADIOLOGY REVIEWED

## 2018-10-31 NOTE — PROGRESS NOTE ADULT - SUBJECTIVE AND OBJECTIVE BOX
CARDIOLOGY     PROGRESS  NOTE   ________________________________________________    CHIEF COMPLAINT:Patient is a 83y old  Female who presents with a chief complaint of sob (31 Oct 2018 07:22)    	  REVIEW OF SYSTEMS:  CONSTITUTIONAL: No fever, weight loss, or fatigue  EYES: No eye pain, visual disturbances, or discharge  ENT:  No difficulty hearing, tinnitus, vertigo; No sinus or throat pain  NECK: No pain or stiffness  RESPIRATORY: No cough, wheezing, chills or hemoptysis; No Shortness of Breath  CARDIOVASCULAR: No chest pain, palpitations, passing out, dizziness, or leg swelling  GASTROINTESTINAL: No abdominal or epigastric pain. No nausea, vomiting, or hematemesis; No diarrhea or constipation. No melena or hematochezia.  GENITOURINARY: No dysuria, frequency, hematuria, or incontinence  NEUROLOGICAL: No headaches, memory loss, loss of strength, numbness, or tremors  SKIN: No itching, burning, rashes, or lesions   LYMPH Nodes: No enlarged glands  ENDOCRINE: No heat or cold intolerance; No hair loss  MUSCULOSKELETAL: No joint pain or swelling; No muscle, back, or extremity pain  PSYCHIATRIC: No depression, anxiety, mood swings, or difficulty sleeping  HEME/LYMPH: No easy bruising, or bleeding gums  ALLERGY AND IMMUNOLOGIC: No hives or eczema	    [ ] All others negative	  [ ] Unable to obtain    PHYSICAL EXAM:  T(C): 36.7 (10-31-18 @ 04:18), Max: 36.7 (10-31-18 @ 04:18)  HR: 72 (10-31-18 @ 06:21) (72 - 82)  BP: 130/75 (10-31-18 @ 04:18) (116/69 - 136/82)  RR: 18 (10-31-18 @ 06:21) (18 - 20)  SpO2: 99% (10-31-18 @ 06:21) (95% - 100%)  Wt(kg): --  I&O's Summary    30 Oct 2018 07:01  -  31 Oct 2018 07:00  --------------------------------------------------------  IN: 1120 mL / OUT: 0 mL / NET: 1120 mL        Appearance: Normal	  HEENT:   Normal oral mucosa, PERRL, EOMI	  Lymphatic: No lymphadenopathy  Cardiovascular: Normal S1 S2, No JVD, No murmurs, No edema  Respiratory: Lungs clear to auscultation	  Psychiatry: A & O x 3, Mood & affect appropriate  Gastrointestinal:  Soft, Non-tender, + BS	  Skin: No rashes, No ecchymoses, No cyanosis	  Neurologic: Non-focal  Extremities: Normal range of motion, No clubbing, cyanosis or edema  Vascular: Peripheral pulses palpable 2+ bilaterally    MEDICATIONS  (STANDING):  ALBUTerol/ipratropium for Nebulization 3 milliLiter(s) Nebulizer every 6 hours  allopurinol 300 milliGRAM(s) Oral daily  amLODIPine   Tablet 10 milliGRAM(s) Oral daily  Biotene Dry Mouth Oral Rinse 5 milliLiter(s) Swish and Spit four times a day  chlorhexidine 4% Liquid 1 Application(s) Topical <User Schedule>  citalopram 40 milliGRAM(s) Oral daily  heparin  Injectable 5000 Unit(s) SubCutaneous every 12 hours  insulin glargine Injectable (LANTUS) 14 Unit(s) SubCutaneous at bedtime  insulin lispro (HumaLOG) corrective regimen sliding scale   SubCutaneous every 6 hours  levETIRAcetam  Solution 750 milliGRAM(s) Oral two times a day  levothyroxine 50 MICROGram(s) Oral daily  metoprolol tartrate 25 milliGRAM(s) Oral two times a day  pantoprazole   Suspension 40 milliGRAM(s) Oral daily      TELEMETRY: 	    ECG:  	  RADIOLOGY:  OTHER: 	  	  LABS:	 	    CARDIAC MARKERS:                                10.2   8.25  )-----------( 308      ( 31 Oct 2018 07:56 )             31.8     10-31    133<L>  |  96  |  33<H>  ----------------------------<  140<H>  4.8   |  24  |  0.77    Ca    9.9      31 Oct 2018 06:01      proBNP:   Lipid Profile:   HgA1c:   TSH:         Assessment and plan  ---------------------------  doing well  continue current meds  oob to chair  dc planning

## 2018-10-31 NOTE — PROGRESS NOTE ADULT - ASSESSMENT
83F hx CVA, bed bound, PEG dependent, HTN, HLD, admitted for SOB. Patient with Prolonged hospital stay with susp. PNA noted with hyperglycemia 309 mg/dL on am labs.    Patient with no history of diabetes.  HbA1c on admission 5.4%-5.6%.  Patient on PEG feeds at 30 cc/H continuous, tolerating well.  No hyperglycemia noted during this admission.  On 10/21 patient noted with SOB, received treatment which included Solumedrol 125 mg IV X1.  Hyperglycemia noted post treatment.  Ongoing work up by ID for possible infection.  Steroids were not continued.    Suspect hyperglycemia is due to high dose steroids treatment on 10/21. However hyperglycemia persisted on continuous feeds per peg after that.  Patient on Peg feeds currently on Jevity 55 cc/h for 22 hours, tolerating feeds well.  Lantus insulin 14 units at HS was required to keep hyperglycemia under control.      Suggest:   Will D/C on lantus 14 units daily, hold if feeds held.  As patient has no history of diabetes and HbA1c was 5.4% to 5.6% on admission stress may play a role.  Thereofre I would suggest to stop lantus after two weeks at rehab and reevaluate insulin needs.  Continue synthroid 50 mcg daily.

## 2018-10-31 NOTE — PHYSICAL THERAPY INITIAL EVALUATION ADULT - ADDITIONAL COMMENTS
Pt has been at [facility] term [facility] for ~ 1 yr and has been dependent for all ADLs.

## 2018-10-31 NOTE — PROGRESS NOTE ADULT - SUBJECTIVE AND OBJECTIVE BOX
no sob/ bed  bound  REVIEW OF SYSTEMS:  GEN: no fever,    no chills  RESP: no SOB,   no cough  CVS: no chest pain,   no palpitations  GI: no abdominal pain,   no nausea,   no vomiting,   no constipation,   no diarrhea  : no dysuria,   no frequency  NEURO: no headache,   no dizziness  PSYCH: no depression,   not anxious  Derm : no rash    MEDICATIONS  (STANDING):  ALBUTerol/ipratropium for Nebulization 3 milliLiter(s) Nebulizer every 6 hours  allopurinol 300 milliGRAM(s) Oral daily  amLODIPine   Tablet 10 milliGRAM(s) Oral daily  Biotene Dry Mouth Oral Rinse 5 milliLiter(s) Swish and Spit four times a day  chlorhexidine 4% Liquid 1 Application(s) Topical <User Schedule>  citalopram 40 milliGRAM(s) Oral daily  heparin  Injectable 5000 Unit(s) SubCutaneous every 12 hours  insulin glargine Injectable (LANTUS) 14 Unit(s) SubCutaneous at bedtime  insulin lispro (HumaLOG) corrective regimen sliding scale   SubCutaneous every 6 hours  levETIRAcetam  Solution 750 milliGRAM(s) Oral two times a day  levothyroxine 50 MICROGram(s) Oral daily  metoprolol tartrate 25 milliGRAM(s) Oral two times a day  pantoprazole   Suspension 40 milliGRAM(s) Oral daily    MEDICATIONS  (PRN):  acetaminophen    Suspension .. 650 milliGRAM(s) Enteral Tube every 6 hours PRN Moderate Pain (4 - 6)      Vital Signs Last 24 Hrs  T(C): 36.7 (31 Oct 2018 04:18), Max: 36.7 (31 Oct 2018 04:18)  T(F): 98.1 (31 Oct 2018 04:18), Max: 98.1 (31 Oct 2018 04:18)  HR: 72 (31 Oct 2018 06:21) (72 - 82)  BP: 130/75 (31 Oct 2018 04:18) (116/69 - 136/82)  BP(mean): --  RR: 18 (31 Oct 2018 06:21) (18 - 20)  SpO2: 99% (31 Oct 2018 06:21) (95% - 100%)  CAPILLARY BLOOD GLUCOSE      POCT Blood Glucose.: 144 mg/dL (31 Oct 2018 06:01)  POCT Blood Glucose.: 139 mg/dL (31 Oct 2018 00:11)  POCT Blood Glucose.: 144 mg/dL (30 Oct 2018 21:22)  POCT Blood Glucose.: 168 mg/dL (30 Oct 2018 16:30)  POCT Blood Glucose.: 114 mg/dL (30 Oct 2018 11:43)    I&O's Summary    30 Oct 2018 07:01  -  31 Oct 2018 07:00  --------------------------------------------------------  IN: 1120 mL / OUT: 0 mL / NET: 1120 mL        PHYSICAL EXAM:  HEAD:  Atraumatic, Normocephalic  NECK: Supple, No   JVD  CHEST/LUNG:   no     rales,     no,    rhonchi  HEART: Regular rate and rhythm;         murmur  ABDOMEN: Soft, Nontender, ;   EXTREMITIES:     no   edema  NEUROLOGY:  alert    LABS:                        10.0   9.76  )-----------( 317      ( 30 Oct 2018 09:51 )             31.5     10-31    133<L>  |  96  |  33<H>  ----------------------------<  140<H>  4.8   |  24  |  0.77    Ca    9.9      31 Oct 2018 06:01                    Hemoglobin A1C, Whole Blood: 5.6 % (10-01 @ 07:40)    Thyroid Stimulating Hormone, Serum: 1.30 uIU/mL (10-01 @ 07:40)          Consultant(s) Notes Reviewed:      Care Discussed with Consultants/Other Providers:

## 2018-10-31 NOTE — PHYSICAL THERAPY INITIAL EVALUATION ADULT - PERTINENT HX OF CURRENT PROBLEM, REHAB EVAL
Pt is a 84 y/o female admitted to [facility] on 10/15/18 hx CVA, bed bound, PEG dependent, HTN, HLD, BIBEMS for resp distress x1day.  CT chest with multifocal PNA.  10/20: s/p RRT on 2M, transferred to tele 4M

## 2018-10-31 NOTE — PROGRESS NOTE ADULT - ASSESSMENT
82 year old female PMH  h/o hemorrhagic CVA, PEG,  HTN, MI,      HLD, gout   p/w SOB and  acute respiratory distress/ bipap  in er/ ,  elevated wbc and  hypokalemia.    probable aspiration pma/  gram negative  pna/on admission,  was on iv  zosyn  dementia  by  history   ct chest ,  multifocal pna   npo/ on peg feedings,  on  synthroid, Kepra , celexa     now  with  MRSA bacteremia/  staph  hemolyticus/ CNS/  s, epidermidis,  mlple  organisms isolated   rpt  blood  c/s, no growth   echo, no vegetations,  normal  ef    on  Cefepime/ Vanco/ completed  on  10/ 28, pe r ID  ,  chronic  aspiration/  pna/  gram negative  dm/  dr gilliam   cta  chest., no PE/  has multifocal pna  right breast  mass, unable  to  do  mammo as  an in pt/   oncology/ dr victoria  saw  pt. needs  op  f/p  with breast  surgeon  and oncology,  and  w/p, when stable from pulm perspective  start d/c planning.   now  on  5  liters n/ canula

## 2018-10-31 NOTE — PROGRESS NOTE ADULT - ASSESSMENT
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 presents with fever, sob, and what was initially felt to be MRSA bacteremia- PCR +, but in d/w Micro, final report, admission Bld Cxs- no MRSA on plates, growth of 2 strains CoNS- S haemolyticus and S epidermitis; this best explained as gross contamination- all repeat Bld Cxs negative  False + MRSA PCR in setting of overgrowth of CoNS  Multifocal pneumonia primary focus  Remains afebrile, relatively hypoxic, continuous to require high flow  CXR no significant change  Poor resp reserve  suspect a steroid induced leukocytosis which has moderated  No signs of active infection, vanco and cefepime stopped 10/29  stable off antibiotics  Recommendations:  1.observe off antibiotics  2.Supportive care per pulmonary  3.Additional ID w/u as needed.At this point no indication to extend therapy although still tenuous.She is likely an ongoing aspiration risk.We will stop actively following, please call if ID issues arise

## 2018-10-31 NOTE — PROGRESS NOTE ADULT - PROBLEM SELECTOR PLAN 1
Improved  -Keep sp02>90% on supplemental oxygen, currently on 4L NC  -Patient is debilitated, unable to get OOB 2nd LLE contraction, HOB >45 daytime  -CTPA negative for PE  -No objection to d/c planning from pulm perspective Improved  -Keep sp02>90% on supplemental oxygen, currently on 2L NC  -Patient is debilitated, unable to get OOB 2nd LLE contraction, HOB >45 daytime  -CTPA negative for PE  -No objection to d/c planning from pulm perspective

## 2018-11-01 VITALS — SYSTOLIC BLOOD PRESSURE: 122 MMHG | HEART RATE: 80 BPM | DIASTOLIC BLOOD PRESSURE: 68 MMHG

## 2018-11-01 LAB
GLUCOSE BLDC GLUCOMTR-MCNC: 130 MG/DL — HIGH (ref 70–99)
GLUCOSE BLDC GLUCOMTR-MCNC: 148 MG/DL — HIGH (ref 70–99)

## 2018-11-01 RX ORDER — AMLODIPINE BESYLATE 2.5 MG/1
1 TABLET ORAL
Qty: 30 | Refills: 0
Start: 2018-11-01 | End: 2018-11-30

## 2018-11-01 RX ORDER — METOPROLOL TARTRATE 50 MG
1 TABLET ORAL
Qty: 60 | Refills: 0
Start: 2018-11-01 | End: 2018-11-30

## 2018-11-01 RX ORDER — IPRATROPIUM/ALBUTEROL SULFATE 18-103MCG
3 AEROSOL WITH ADAPTER (GRAM) INHALATION
Qty: 1 | Refills: 0 | OUTPATIENT
Start: 2018-11-01 | End: 2018-11-30

## 2018-11-01 RX ORDER — AMLODIPINE BESYLATE 2.5 MG/1
1 TABLET ORAL
Qty: 0 | Refills: 0 | COMMUNITY

## 2018-11-01 RX ORDER — COLCHICINE 0.6 MG
1 TABLET ORAL
Qty: 0 | Refills: 0 | COMMUNITY

## 2018-11-01 RX ORDER — ALLOPURINOL 300 MG
1 TABLET ORAL
Qty: 30 | Refills: 0
Start: 2018-11-01 | End: 2018-11-30

## 2018-11-01 RX ORDER — BACITRACIN ZINC 500 UNIT/G
1 OINTMENT IN PACKET (EA) TOPICAL
Qty: 0 | Refills: 0 | COMMUNITY

## 2018-11-01 RX ORDER — LEVETIRACETAM 250 MG/1
7.5 TABLET, FILM COATED ORAL
Qty: 1 | Refills: 0
Start: 2018-11-01 | End: 2018-11-30

## 2018-11-01 RX ORDER — LANOLIN/MINERAL OIL
1 LOTION (ML) TOPICAL
Qty: 0 | Refills: 0 | COMMUNITY

## 2018-11-01 RX ORDER — GABAPENTIN 400 MG/1
1 CAPSULE ORAL
Qty: 0 | Refills: 0 | COMMUNITY

## 2018-11-01 RX ORDER — LEVOTHYROXINE SODIUM 125 MCG
1 TABLET ORAL
Qty: 30 | Refills: 0
Start: 2018-11-01 | End: 2018-11-30

## 2018-11-01 RX ORDER — IPRATROPIUM/ALBUTEROL SULFATE 18-103MCG
3 AEROSOL WITH ADAPTER (GRAM) INHALATION
Qty: 0 | Refills: 0 | COMMUNITY

## 2018-11-01 RX ORDER — OMEPRAZOLE 10 MG/1
1 CAPSULE, DELAYED RELEASE ORAL
Qty: 0 | Refills: 0 | COMMUNITY

## 2018-11-01 RX ORDER — OXYCODONE HYDROCHLORIDE 5 MG/1
1 TABLET ORAL
Qty: 0 | Refills: 0 | COMMUNITY

## 2018-11-01 RX ORDER — ACETAMINOPHEN 500 MG
20 TABLET ORAL
Qty: 0 | Refills: 0 | COMMUNITY

## 2018-11-01 RX ORDER — PANTOPRAZOLE SODIUM 20 MG/1
1 TABLET, DELAYED RELEASE ORAL
Qty: 1 | Refills: 0 | OUTPATIENT
Start: 2018-11-01 | End: 2018-11-30

## 2018-11-01 RX ORDER — CYCLOBENZAPRINE HYDROCHLORIDE 10 MG/1
1 TABLET, FILM COATED ORAL
Qty: 0 | Refills: 0 | COMMUNITY

## 2018-11-01 RX ORDER — CITALOPRAM 10 MG/1
1 TABLET, FILM COATED ORAL
Qty: 30 | Refills: 0
Start: 2018-11-01 | End: 2018-11-30

## 2018-11-01 RX ORDER — MICONAZOLE NITRATE 2 %
1 CREAM (GRAM) TOPICAL
Qty: 0 | Refills: 0 | COMMUNITY

## 2018-11-01 RX ORDER — HYDROCORTISONE 1 %
1 OINTMENT (GRAM) TOPICAL
Qty: 0 | Refills: 0 | COMMUNITY

## 2018-11-01 RX ORDER — BUDESONIDE, MICRONIZED 100 %
2 POWDER (GRAM) MISCELLANEOUS
Qty: 0 | Refills: 0 | COMMUNITY

## 2018-11-01 RX ORDER — TERBINAFINE HYDROCHLORIDE 1 G/100G
1 CREAM TOPICAL
Qty: 0 | Refills: 0 | COMMUNITY

## 2018-11-01 RX ADMIN — Medication 650 MILLIGRAM(S): at 12:45

## 2018-11-01 RX ADMIN — Medication 5 MILLILITER(S): at 05:57

## 2018-11-01 RX ADMIN — Medication 3 MILLILITER(S): at 11:52

## 2018-11-01 RX ADMIN — AMLODIPINE BESYLATE 10 MILLIGRAM(S): 2.5 TABLET ORAL at 05:56

## 2018-11-01 RX ADMIN — Medication 300 MILLIGRAM(S): at 11:52

## 2018-11-01 RX ADMIN — Medication 50 MICROGRAM(S): at 05:56

## 2018-11-01 RX ADMIN — PANTOPRAZOLE SODIUM 40 MILLIGRAM(S): 20 TABLET, DELAYED RELEASE ORAL at 11:52

## 2018-11-01 RX ADMIN — Medication 3 MILLILITER(S): at 05:56

## 2018-11-01 RX ADMIN — CITALOPRAM 40 MILLIGRAM(S): 10 TABLET, FILM COATED ORAL at 11:52

## 2018-11-01 RX ADMIN — HEPARIN SODIUM 5000 UNIT(S): 5000 INJECTION INTRAVENOUS; SUBCUTANEOUS at 05:57

## 2018-11-01 RX ADMIN — CHLORHEXIDINE GLUCONATE 1 APPLICATION(S): 213 SOLUTION TOPICAL at 10:52

## 2018-11-01 RX ADMIN — Medication 5 MILLILITER(S): at 13:58

## 2018-11-01 RX ADMIN — Medication 25 MILLIGRAM(S): at 05:56

## 2018-11-01 RX ADMIN — LEVETIRACETAM 750 MILLIGRAM(S): 250 TABLET, FILM COATED ORAL at 05:56

## 2018-11-01 RX ADMIN — Medication 650 MILLIGRAM(S): at 11:52

## 2018-11-01 NOTE — PROGRESS NOTE ADULT - REASON FOR ADMISSION
sob

## 2018-11-01 NOTE — PROGRESS NOTE ADULT - ASSESSMENT
82 year old female PMH  h/o hemorrhagic CVA, PEG,  HTN, MI,      HLD, gout   p/w SOB and  acute respiratory distress/ bipap  in er/ ,  elevated wbc and  hypokalemia.    probable aspiration pma/  gram negative  pna/on admission,  was on iv  zosyn  dementia  by  history   ct chest ,  multifocal pna   npo/ on peg feedings,  on  synthroid, Kepra , celexa     now  with  MRSA bacteremia/  staph  hemolyticus/ CNS/  s, epidermidis,  mlple  organisms isolated   rpt  blood  c/s, no growth   echo, no vegetations,  normal  ef    on  Cefepime/ Vanco/ completed  on  10/ 28, pe r ID  ,  chronic  aspiration/  pna/  gram negative  dm/  dr gilliam   cta  chest., no PE/  has multifocal pna  right breast  mass, unable  to  do  mammo as  an in pt/   oncology/ dr victoria  saw  pt. needs  op  f/p  with breast  surgeon  and oncology,  and  w/p, when stable from pulm perspective     now  on  n/ canula   d/c to  rehab

## 2018-11-01 NOTE — PROGRESS NOTE ADULT - ASSESSMENT
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 CT chest with multifocal PNA. She had RRT on 10/20 for hypoxic respiratory distress, with high fi02 requirements. Hypoxia likely 2nd shunt throughout bibasilar atelectasis

## 2018-11-01 NOTE — PROGRESS NOTE ADULT - PROBLEM SELECTOR PROBLEM 1
Hyperglycemia, drug-induced
Acute respiratory failure with hypoxia
Hyperglycemia, drug-induced
Acute respiratory failure with hypoxia
Acute respiratory failure with hypoxia

## 2018-11-01 NOTE — PROGRESS NOTE ADULT - PROBLEM SELECTOR PLAN 1
Improved  -Keep sp02>90% on supplemental oxygen, currently on 2L NC  -Patient is debilitated, unable to get OOB 2nd LLE contraction, HOB >45 daytime  -CTPA negative for PE  -No objection to d/c planning from pulm perspective

## 2018-11-01 NOTE — PROGRESS NOTE ADULT - SUBJECTIVE AND OBJECTIVE BOX
Follow-up Pulm Progress Note    No new respiratory events overnight.  Denies SOB/CP.   93% on 2L NC  89% on RA    Medications:  MEDICATIONS  (STANDING):  ALBUTerol/ipratropium for Nebulization 3 milliLiter(s) Nebulizer every 6 hours  allopurinol 300 milliGRAM(s) Oral daily  amLODIPine   Tablet 10 milliGRAM(s) Oral daily  Biotene Dry Mouth Oral Rinse 5 milliLiter(s) Swish and Spit four times a day  chlorhexidine 4% Liquid 1 Application(s) Topical <User Schedule>  citalopram 40 milliGRAM(s) Oral daily  heparin  Injectable 5000 Unit(s) SubCutaneous every 12 hours  insulin glargine Injectable (LANTUS) 14 Unit(s) SubCutaneous at bedtime  insulin lispro (HumaLOG) corrective regimen sliding scale   SubCutaneous every 6 hours  levETIRAcetam  Solution 750 milliGRAM(s) Oral two times a day  levothyroxine 50 MICROGram(s) Oral daily  metoprolol tartrate 25 milliGRAM(s) Oral two times a day  pantoprazole   Suspension 40 milliGRAM(s) Oral daily    MEDICATIONS  (PRN):  acetaminophen    Suspension .. 650 milliGRAM(s) Enteral Tube every 6 hours PRN Moderate Pain (4 - 6)          Vital Signs Last 24 Hrs  T(C): 36.6 (01 Nov 2018 11:28), Max: 37.2 (31 Oct 2018 21:14)  T(F): 97.8 (01 Nov 2018 11:28), Max: 98.9 (31 Oct 2018 21:14)  HR: 83 (01 Nov 2018 11:28) (80 - 88)  BP: 126/69 (01 Nov 2018 11:28) (122/70 - 148/77)  BP(mean): --  RR: 17 (01 Nov 2018 11:28) (17 - 18)  SpO2: 97% (01 Nov 2018 11:28) (95% - 97%) on 2L NC          10-31 @ 07:01  -  11-01 @ 07:00  --------------------------------------------------------  IN: 1660 mL / OUT: 0 mL / NET: 1660 mL          LABS:                        10.2   8.25  )-----------( 308      ( 31 Oct 2018 07:56 )             31.8     10-31    133<L>  |  96  |  33<H>  ----------------------------<  140<H>  4.8   |  24  |  0.77    Ca    9.9      31 Oct 2018 06:01            CAPILLARY BLOOD GLUCOSE      POCT Blood Glucose.: 130 mg/dL (01 Nov 2018 11:52)            Physical Examination:  PULM: Diminished BS bilaterally   CVS: S1, S2 heard    RADIOLOGY REVIEWED

## 2018-11-01 NOTE — PROGRESS NOTE ADULT - SUBJECTIVE AND OBJECTIVE BOX
be d bound,  stable  REVIEW OF SYSTEMS:  GEN: no fever,    no chills  RESP: no SOB,   no cough  CVS: no chest pain,   no palpitations  GI: no abdominal pain,   no nausea,   no vomiting,   no constipation,   no diarrhea  : no dysuria,   no frequency  NEURO: no headache,   no dizziness  PSYCH: no depression,   not anxious  Derm : no rash    MEDICATIONS  (STANDING):  ALBUTerol/ipratropium for Nebulization 3 milliLiter(s) Nebulizer every 6 hours  allopurinol 300 milliGRAM(s) Oral daily  amLODIPine   Tablet 10 milliGRAM(s) Oral daily  Biotene Dry Mouth Oral Rinse 5 milliLiter(s) Swish and Spit four times a day  chlorhexidine 4% Liquid 1 Application(s) Topical <User Schedule>  citalopram 40 milliGRAM(s) Oral daily  heparin  Injectable 5000 Unit(s) SubCutaneous every 12 hours  insulin glargine Injectable (LANTUS) 14 Unit(s) SubCutaneous at bedtime  insulin lispro (HumaLOG) corrective regimen sliding scale   SubCutaneous every 6 hours  levETIRAcetam  Solution 750 milliGRAM(s) Oral two times a day  levothyroxine 50 MICROGram(s) Oral daily  metoprolol tartrate 25 milliGRAM(s) Oral two times a day  pantoprazole   Suspension 40 milliGRAM(s) Oral daily    MEDICATIONS  (PRN):  acetaminophen    Suspension .. 650 milliGRAM(s) Enteral Tube every 6 hours PRN Moderate Pain (4 - 6)      Vital Signs Last 24 Hrs  T(C): 36.8 (01 Nov 2018 04:12), Max: 37.2 (31 Oct 2018 21:14)  T(F): 98.2 (01 Nov 2018 04:12), Max: 98.9 (31 Oct 2018 21:14)  HR: 88 (01 Nov 2018 04:12) (75 - 88)  BP: 124/68 (01 Nov 2018 04:12) (112/57 - 148/77)  BP(mean): --  RR: 18 (01 Nov 2018 04:12) (17 - 18)  SpO2: 97% (01 Nov 2018 04:12) (94% - 97%)  CAPILLARY BLOOD GLUCOSE      POCT Blood Glucose.: 148 mg/dL (01 Nov 2018 05:36)  POCT Blood Glucose.: 151 mg/dL (31 Oct 2018 23:08)  POCT Blood Glucose.: 148 mg/dL (31 Oct 2018 21:17)  POCT Blood Glucose.: 129 mg/dL (31 Oct 2018 16:36)  POCT Blood Glucose.: 144 mg/dL (31 Oct 2018 12:07)    I&O's Summary    31 Oct 2018 07:01  -  01 Nov 2018 07:00  --------------------------------------------------------  IN: 1660 mL / OUT: 0 mL / NET: 1660 mL        PHYSICAL EXAM:  HEAD:  Atraumatic, Normocephalic  NECK: Supple, No   JVD  CHEST/LUNG:   no     rales,     no,    rhonchi  HEART: Regular rate and rhythm;         murmur  ABDOMEN: Soft, Nontender, ;   EXTREMITIES:    no    edema  NEUROLOGY:  alert    LABS:                        10.2   8.25  )-----------( 308      ( 31 Oct 2018 07:56 )             31.8     10-31    133<L>  |  96  |  33<H>  ----------------------------<  140<H>  4.8   |  24  |  0.77    Ca    9.9      31 Oct 2018 06:01                    Hemoglobin A1C, Whole Blood: 5.6 % (10-01 @ 07:40)    Thyroid Stimulating Hormone, Serum: 1.30 uIU/mL (10-01 @ 07:40)          Consultant(s) Notes Reviewed:      Care Discussed with Consultants/Other Providers:

## 2018-11-01 NOTE — PROGRESS NOTE ADULT - PROBLEM SELECTOR PLAN 3
2nd CVA  -TF via PEG

## 2018-11-01 NOTE — PROGRESS NOTE ADULT - PROBLEM SELECTOR PROBLEM 2
Aspiration pneumonia, unspecified aspiration pneumonia type, unspecified laterality, unspecified part of lung

## 2018-11-01 NOTE — PROGRESS NOTE ADULT - PROBLEM SELECTOR PROBLEM 3
Dysphagia
Dementia without behavioral disturbance, unspecified dementia type
Dysphagia

## 2018-11-01 NOTE — PROGRESS NOTE ADULT - SUBJECTIVE AND OBJECTIVE BOX
CARDIOLOGY     PROGRESS  NOTE   ________________________________________________    CHIEF COMPLAINT:Patient is a 83y old  Female who presents with a chief complaint of sob (01 Nov 2018 07:46)    	  REVIEW OF SYSTEMS:  CONSTITUTIONAL: No fever, weight loss, or fatigue  EYES: No eye pain, visual disturbances, or discharge  ENT:  No difficulty hearing, tinnitus, vertigo; No sinus or throat pain  NECK: No pain or stiffness  RESPIRATORY: No cough, wheezing, chills or hemoptysis; No Shortness of Breath  CARDIOVASCULAR: No chest pain, palpitations, passing out, dizziness, or leg swelling  GASTROINTESTINAL: No abdominal or epigastric pain. No nausea, vomiting, or hematemesis; No diarrhea or constipation. No melena or hematochezia.  GENITOURINARY: No dysuria, frequency, hematuria, or incontinence  NEUROLOGICAL: No headaches, memory loss, loss of strength, numbness, or tremors  SKIN: No itching, burning, rashes, or lesions   LYMPH Nodes: No enlarged glands  ENDOCRINE: No heat or cold intolerance; No hair loss  MUSCULOSKELETAL: No joint pain or swelling; No muscle, back, or extremity pain  PSYCHIATRIC: No depression, anxiety, mood swings, or difficulty sleeping  HEME/LYMPH: No easy bruising, or bleeding gums  ALLERGY AND IMMUNOLOGIC: No hives or eczema	    [ ] All others negative	  [ ] Unable to obtain    PHYSICAL EXAM:  T(C): 36.8 (11-01-18 @ 04:12), Max: 37.2 (10-31-18 @ 21:14)  HR: 88 (11-01-18 @ 04:12) (80 - 88)  BP: 124/68 (11-01-18 @ 04:12) (122/70 - 148/77)  RR: 18 (11-01-18 @ 04:12) (17 - 18)  SpO2: 97% (11-01-18 @ 04:12) (95% - 97%)  Wt(kg): --  I&O's Summary    31 Oct 2018 07:01  -  01 Nov 2018 07:00  --------------------------------------------------------  IN: 1660 mL / OUT: 0 mL / NET: 1660 mL        Appearance: Normal	  HEENT:   Normal oral mucosa, PERRL, EOMI	  Lymphatic: No lymphadenopathy  Cardiovascular: Normal S1 S2, No JVD, No murmurs, No edema  Respiratory: Lungs clear to auscultation	  Psychiatry: A & O x 3, Mood & affect appropriate  Gastrointestinal:  Soft, Non-tender, + BS	  Skin: No rashes, No ecchymoses, No cyanosis	  Neurologic: Non-focal  Extremities: Normal range of motion, No clubbing, cyanosis or edema  Vascular: Peripheral pulses palpable 2+ bilaterally    MEDICATIONS  (STANDING):  ALBUTerol/ipratropium for Nebulization 3 milliLiter(s) Nebulizer every 6 hours  allopurinol 300 milliGRAM(s) Oral daily  amLODIPine   Tablet 10 milliGRAM(s) Oral daily  Biotene Dry Mouth Oral Rinse 5 milliLiter(s) Swish and Spit four times a day  chlorhexidine 4% Liquid 1 Application(s) Topical <User Schedule>  citalopram 40 milliGRAM(s) Oral daily  heparin  Injectable 5000 Unit(s) SubCutaneous every 12 hours  insulin glargine Injectable (LANTUS) 14 Unit(s) SubCutaneous at bedtime  insulin lispro (HumaLOG) corrective regimen sliding scale   SubCutaneous every 6 hours  levETIRAcetam  Solution 750 milliGRAM(s) Oral two times a day  levothyroxine 50 MICROGram(s) Oral daily  metoprolol tartrate 25 milliGRAM(s) Oral two times a day  pantoprazole   Suspension 40 milliGRAM(s) Oral daily      TELEMETRY: 	    ECG:  	  RADIOLOGY:  OTHER: 	  	  LABS:	 	    CARDIAC MARKERS:                                10.2   8.25  )-----------( 308      ( 31 Oct 2018 07:56 )             31.8     10-31    133<L>  |  96  |  33<H>  ----------------------------<  140<H>  4.8   |  24  |  0.77    Ca    9.9      31 Oct 2018 06:01      proBNP:   Lipid Profile:   HgA1c:   TSH:         Assessment and plan  ---------------------------  doing much better  continue meds  physical therapy  dc planning  dvt prophylaxis

## 2018-11-11 PROCEDURE — 82962 GLUCOSE BLOOD TEST: CPT

## 2018-11-11 PROCEDURE — 84132 ASSAY OF SERUM POTASSIUM: CPT

## 2018-11-11 PROCEDURE — 87086 URINE CULTURE/COLONY COUNT: CPT

## 2018-11-11 PROCEDURE — 80048 BASIC METABOLIC PNL TOTAL CA: CPT

## 2018-11-11 PROCEDURE — 82803 BLOOD GASES ANY COMBINATION: CPT

## 2018-11-11 PROCEDURE — 83735 ASSAY OF MAGNESIUM: CPT

## 2018-11-11 PROCEDURE — 93306 TTE W/DOPPLER COMPLETE: CPT

## 2018-11-11 PROCEDURE — 87186 SC STD MICRODIL/AGAR DIL: CPT

## 2018-11-11 PROCEDURE — 87150 DNA/RNA AMPLIFIED PROBE: CPT

## 2018-11-11 PROCEDURE — 82947 ASSAY GLUCOSE BLOOD QUANT: CPT

## 2018-11-11 PROCEDURE — 84100 ASSAY OF PHOSPHORUS: CPT

## 2018-11-11 PROCEDURE — 94640 AIRWAY INHALATION TREATMENT: CPT

## 2018-11-11 PROCEDURE — 81001 URINALYSIS AUTO W/SCOPE: CPT

## 2018-11-11 PROCEDURE — 36600 WITHDRAWAL OF ARTERIAL BLOOD: CPT

## 2018-11-11 PROCEDURE — 93005 ELECTROCARDIOGRAM TRACING: CPT

## 2018-11-11 PROCEDURE — 96365 THER/PROPH/DIAG IV INF INIT: CPT

## 2018-11-11 PROCEDURE — 80053 COMPREHEN METABOLIC PANEL: CPT

## 2018-11-11 PROCEDURE — 85610 PROTHROMBIN TIME: CPT

## 2018-11-11 PROCEDURE — 87798 DETECT AGENT NOS DNA AMP: CPT

## 2018-11-11 PROCEDURE — 71250 CT THORAX DX C-: CPT

## 2018-11-11 PROCEDURE — 94660 CPAP INITIATION&MGMT: CPT

## 2018-11-11 PROCEDURE — 93970 EXTREMITY STUDY: CPT

## 2018-11-11 PROCEDURE — 87581 M.PNEUMON DNA AMP PROBE: CPT

## 2018-11-11 PROCEDURE — 85027 COMPLETE CBC AUTOMATED: CPT

## 2018-11-11 PROCEDURE — 84295 ASSAY OF SERUM SODIUM: CPT

## 2018-11-11 PROCEDURE — 99291 CRITICAL CARE FIRST HOUR: CPT | Mod: 25

## 2018-11-11 PROCEDURE — 85014 HEMATOCRIT: CPT

## 2018-11-11 PROCEDURE — 97162 PT EVAL MOD COMPLEX 30 MIN: CPT

## 2018-11-11 PROCEDURE — 85730 THROMBOPLASTIN TIME PARTIAL: CPT

## 2018-11-11 PROCEDURE — 83605 ASSAY OF LACTIC ACID: CPT

## 2018-11-11 PROCEDURE — 80202 ASSAY OF VANCOMYCIN: CPT

## 2018-11-11 PROCEDURE — 82330 ASSAY OF CALCIUM: CPT

## 2018-11-11 PROCEDURE — 82435 ASSAY OF BLOOD CHLORIDE: CPT

## 2018-11-11 PROCEDURE — 87486 CHLMYD PNEUM DNA AMP PROBE: CPT

## 2018-11-11 PROCEDURE — 87633 RESP VIRUS 12-25 TARGETS: CPT

## 2018-11-11 PROCEDURE — 71275 CT ANGIOGRAPHY CHEST: CPT

## 2018-11-11 PROCEDURE — 96375 TX/PRO/DX INJ NEW DRUG ADDON: CPT

## 2018-11-11 PROCEDURE — 87040 BLOOD CULTURE FOR BACTERIA: CPT

## 2018-11-11 PROCEDURE — 71045 X-RAY EXAM CHEST 1 VIEW: CPT

## 2018-11-30 ENCOUNTER — OUTPATIENT (OUTPATIENT)
Dept: OUTPATIENT SERVICES | Facility: HOSPITAL | Age: 83
LOS: 1 days | Discharge: HOME | End: 2018-11-30

## 2018-11-30 ENCOUNTER — APPOINTMENT (OUTPATIENT)
Dept: SPEECH THERAPY | Facility: CLINIC | Age: 83
End: 2018-11-30

## 2018-12-01 ENCOUNTER — EMERGENCY (EMERGENCY)
Facility: HOSPITAL | Age: 83
LOS: 1 days | End: 2018-12-01
Attending: EMERGENCY MEDICINE
Payer: COMMERCIAL

## 2018-12-01 VITALS
TEMPERATURE: 98 F | SYSTOLIC BLOOD PRESSURE: 117 MMHG | HEART RATE: 69 BPM | OXYGEN SATURATION: 100 % | DIASTOLIC BLOOD PRESSURE: 74 MMHG | RESPIRATION RATE: 18 BRPM

## 2018-12-01 PROCEDURE — 71045 X-RAY EXAM CHEST 1 VIEW: CPT | Mod: 26

## 2018-12-01 PROCEDURE — 43753 TX GASTRO INTUB W/ASP: CPT

## 2018-12-01 PROCEDURE — 49465 FLUORO EXAM OF G/COLON TUBE: CPT

## 2018-12-01 PROCEDURE — 71045 X-RAY EXAM CHEST 1 VIEW: CPT

## 2018-12-01 PROCEDURE — 99283 EMERGENCY DEPT VISIT LOW MDM: CPT | Mod: 25

## 2018-12-01 PROCEDURE — 99284 EMERGENCY DEPT VISIT MOD MDM: CPT | Mod: 25

## 2018-12-01 NOTE — ED CLERICAL - NS ED CLERK NOTE PRE-ARRIVAL INFORMATION; ADDITIONAL PRE-ARRIVAL INFORMATION
This patient is enrolled in the COPD or PNA STARS readmission program and has active care navigation.  Please call the contact number above to speak with the Care navigation team to obtain additional clinical information regarding this patient and/or to arrange close clinical follow up within 24 hours.  For patients previously on the faculty hospitalist or pulmonary service, please call the Pulmonary Hospitalist service between the hours of 8am-5pm, weekdays for consultation PRIOR to admission or observation.   Consider CDU for management of COPD exacerbation or PNA per guidelines.

## 2018-12-01 NOTE — ED PROVIDER NOTE - MEDICAL DECISION MAKING DETAILS
Attending Shandra Kurtz: 84 y/o female presenting after G tube fell out. G tube placed 1.5 years ago. pt receives all nutrition by g tube. upon arrival pt well appearing, denies any complaints. scant wheeze on exa. cxr shows no evidence of pna and pt without respiratory distress. will replace g tube and re-eval

## 2018-12-01 NOTE — ED PROVIDER NOTE - ATTENDING CONTRIBUTION TO CARE
Attending MD Shandra Kurtz:  I personally have seen and examined this patient.  Resident note reviewed and agree on plan of care and except where noted.  See HPI, PE, and MDM for details.

## 2018-12-01 NOTE — ED ADULT NURSE NOTE - INTERVENTIONS DEFINITIONS
Call bell, personal items and telephone within reach/Monitor for mental status changes and reorient to person, place, and time/Physically safe environment: no spills, clutter or unnecessary equipment/Stretcher in lowest position, wheels locked, appropriate side rails in place/[place] to call system

## 2018-12-01 NOTE — ED PROVIDER NOTE - PROGRESS NOTE DETAILS
-Speculum exam performed, unable to visualize cervix.   -cervical os appeared closed on bimanual exam. Attending Shandra Kurtz: xray showed gtube in position. pt well appearing. will d/c

## 2018-12-01 NOTE — ED PROVIDER NOTE - OBJECTIVE STATEMENT
Pt is a 83yF with a pmh of hemorrhagic CVA with PEG dependence, HLD, gout, came into the ED from rehab d/t her PEG being displaced. She noticed her PEG tube was missing earlier today when she was getting her afternoon/night feeds. Before then, she felt some leakage around the tube with feeding contents she received earlier. She denies pulling the tube out, or sleeping in a position other than supine. She denies ever having the tube being displaced since it was last exchanged several months ago. She also denies any abdominal pain, warmth in the region. Denies fevers/chills, no nausea/vomiting. No coughs, chest pain or palpitations.

## 2018-12-01 NOTE — ED ADULT NURSE NOTE - OBJECTIVE STATEMENT
83 year old female presents to the ED via EMS from [facility] s/p G-Tube dislodgement. Patient is A&Ox3 upon arrival to ED, with history of CVA and contractures on left side. Patient states she received medications and feeding through g-tube this afternoon, noticed mild leaking that had stopped quickly and then went to bed. Patient awoke upon next assessment by nurse and noticed G-tube was out. Patient does not remember pulling on tube or remember it falling out. Mild redness around stoma noted, scant amounts of blood within stoma, no active bleeding noted, no tenderness to abdomen or pain to site. Denies any c/o pain or discomfort, SOB, N/V/D, abd pain, back pain at this time. Comfort and safety measures maintained.

## 2018-12-01 NOTE — ED PROVIDER NOTE - PHYSICAL EXAMINATION
General: No acute distress.  HEENT: NCAT.  PERRL.  EOMI.  No scleral icterus or injection.  dry MM.    Neck: Supple.  Full ROM.  No JVD.  No thyromegaly. No lymphadenopathy.   Heart: RRR.  Normal S1 and S2.  No murmurs, rubs, or gallops.   Lungs: No wheezes, minor rales on the left upper lobe.  Abdomen: BS+, soft, NT/ND.  No organomegaly. Stoma seen, small amount of erythema around the site, but no warmth/fluctuance, minor amount of blood in the stomach,  unclosed. No tenderness around the region.   Skin: Warm and dry.  No rashes.  Extremities: No edema, clubbing, or cyanosis.  2+ peripheral pulses b/l.  Neuro: A&Ox3.  CN II-XII intact.  5/5 strength in UE and LE b/l.  Tactile sensation intact in UE and LE b/l. General: No acute distress.  HEENT: NCAT.  PERRL.  EOMI.  No scleral icterus or injection.  dry MM.    Neck: Supple.  Full ROM.  No JVD.  No thyromegaly. No lymphadenopathy.   Heart: RRR.  Normal S1 and S2.  No murmurs, rubs, or gallops.   Lungs: No wheezes, minor rales on the left upper lobe.  Abdomen: BS+, soft, NT/ND.  No organomegaly. Stoma seen, small amount of erythema around the site, but no warmth/fluctuance, minor amount of blood in the stomach,  unclosed. No tenderness around the region.   Skin: Warm and dry.  No rashes.  Extremities: No edema, clubbing, or cyanosis.  2+ peripheral pulses b/l.  Neuro: A&Ox3.  CN II-XII intact.  5/5 strength in UE and LE b/l.  Tactile sensation intact in UE and LE b/l.  Attending Shandra Kurtz: Gen: NAD, heent: atrauamtic, eomi, perrla, mmm, op pink, uvula midline, neck; nttp, no nuchal rigidity, chest: nttp, no crepitus, cv: rrr, no murmurs, lungs: scant wheezing, abd: soft, nontender, nondistended,Scant erythema on G tube site, nontender and non draining. no peritoneal signs, +BS, no guarding, ext: wwp, neg homans, skin: no rash, neuro: awake and alert, following commands, speech clear,

## 2018-12-02 VITALS
OXYGEN SATURATION: 96 % | SYSTOLIC BLOOD PRESSURE: 122 MMHG | DIASTOLIC BLOOD PRESSURE: 76 MMHG | RESPIRATION RATE: 19 BRPM | TEMPERATURE: 98 F | HEART RATE: 72 BPM

## 2018-12-02 NOTE — ED ADULT NURSE REASSESSMENT NOTE - NS ED NURSE REASSESS COMMENT FT1
Pt resting comfortably with no displays of pain or discomfort. Awaiting nonemergent transfer to [facility] and discharge. Comfort and safety measures maintained.

## 2018-12-12 DIAGNOSIS — H90.3 SENSORINEURAL HEARING LOSS, BILATERAL: ICD-10-CM

## 2018-12-24 NOTE — PROGRESS NOTE ADULT - ASSESSMENT
82 year old female PMH  h/o hemorrhagic CVA, PEG,  HTN, MI,      HLD, gout   p/w SOB and  acute respiratory distress/ bipap  in er/ ,  elevated wbc and  hypokalemia.    probable aspiration pma/  gram negative  pna/on admission,  was on iv  zosyn  dementia  by  history   ct chest ,  multifocal pna   npo/ on peg feedings,  on  synthroid, Kepra , celexa     now  with  MRSA bacteremia/  staph  hemolyticus/ CNS/  s, epidermidis,  mlple  organisms isolated   rpt  blood  c/s, no growth   echo, no vegetations,  normal  ef    on  Cefepime/ Vanco/  ,  chronic  aspiration/  pna/  gram negative    hypernatremia/  free  water via  peg  elevated wbc    on high flow o2./  pulm f/p  dm/  dr gilliam   cta  chest. prelim, no PE birthday/intact

## 2019-01-08 ENCOUNTER — APPOINTMENT (OUTPATIENT)
Dept: SURGICAL ONCOLOGY | Facility: CLINIC | Age: 84
End: 2019-01-08
Payer: MEDICARE

## 2019-01-08 VITALS
HEIGHT: 64 IN | HEART RATE: 80 BPM | SYSTOLIC BLOOD PRESSURE: 147 MMHG | DIASTOLIC BLOOD PRESSURE: 66 MMHG | WEIGHT: 175 LBS | OXYGEN SATURATION: 99 % | BODY MASS INDEX: 29.88 KG/M2

## 2019-01-08 DIAGNOSIS — I10 ESSENTIAL (PRIMARY) HYPERTENSION: ICD-10-CM

## 2019-01-08 DIAGNOSIS — K21.9 GASTRO-ESOPHAGEAL REFLUX DISEASE W/OUT ESOPHAGITIS: ICD-10-CM

## 2019-01-08 DIAGNOSIS — M10.9 GOUT, UNSPECIFIED: ICD-10-CM

## 2019-01-08 DIAGNOSIS — Z86.73 PERSONAL HISTORY OF TRANSIENT ISCHEMIC ATTACK (TIA), AND CEREBRAL INFARCTION W/OUT RESIDUAL DEFICITS: ICD-10-CM

## 2019-01-08 DIAGNOSIS — N63.10 UNSPECIFIED LUMP IN THE RIGHT BREAST, UNSPECIFIED QUADRANT: ICD-10-CM

## 2019-01-08 DIAGNOSIS — F32.9 MAJOR DEPRESSIVE DISORDER, SINGLE EPISODE, UNSPECIFIED: ICD-10-CM

## 2019-01-08 PROCEDURE — 99214 OFFICE O/P EST MOD 30 MIN: CPT

## 2019-01-08 RX ORDER — LEVOTHYROXINE SODIUM 0.05 MG/1
50 TABLET ORAL
Refills: 0 | Status: ACTIVE | COMMUNITY

## 2019-01-08 RX ORDER — METOPROLOL TARTRATE 25 MG/1
25 TABLET, FILM COATED ORAL
Refills: 0 | Status: ACTIVE | COMMUNITY

## 2019-01-08 RX ORDER — IPRATROPIUM BROMIDE AND ALBUTEROL SULFATE 2.5; .5 MG/3ML; MG/3ML
0.5-2.5 (3) SOLUTION RESPIRATORY (INHALATION)
Refills: 0 | Status: ACTIVE | COMMUNITY

## 2019-01-08 RX ORDER — LEVETIRACETAM 100 MG/ML
100 SOLUTION ORAL
Refills: 0 | Status: ACTIVE | COMMUNITY

## 2019-01-08 RX ORDER — CITALOPRAM HYDROBROMIDE 10 MG/5ML
10 SOLUTION ORAL
Refills: 0 | Status: ACTIVE | COMMUNITY

## 2019-01-08 RX ORDER — FAMOTIDINE 40 MG/5ML
40 POWDER, FOR SUSPENSION ORAL
Refills: 0 | Status: ACTIVE | COMMUNITY

## 2019-01-08 RX ORDER — ALLOPURINOL 300 MG/1
300 TABLET ORAL
Refills: 0 | Status: ACTIVE | COMMUNITY

## 2019-01-08 RX ORDER — CYCLOBENZAPRINE HYDROCHLORIDE 5 MG/1
5 TABLET, FILM COATED ORAL
Refills: 0 | Status: ACTIVE | COMMUNITY

## 2019-01-08 RX ORDER — OXYCODONE HYDROCHLORIDE 5 MG/1
5 CAPSULE ORAL
Refills: 0 | Status: ACTIVE | COMMUNITY

## 2019-01-08 RX ORDER — AMLODIPINE BESYLATE 10 MG/1
10 TABLET ORAL
Refills: 0 | Status: ACTIVE | COMMUNITY

## 2019-01-08 NOTE — PHYSICAL EXAM
[Normal] : supple, no neck mass and thyroid not enlarged [Normal Supraclavicular Lymph Nodes] : normal supraclavicular lymph nodes [Normal Axillary Lymph Nodes] : normal axillary lymph nodes [Normal] : oriented to person, place and time, with appropriate affect [FreeTextEntry1] : RC present for exam.  [de-identified] : Palpable firm 2cm Right breast mass at 12:00 without tenderness.  No palpable masses in Left breast.  [de-identified] : Normal respiratory effort.  Wheezing noted bilaterally.

## 2019-01-08 NOTE — HISTORY OF PRESENT ILLNESS
[de-identified] : Ms. Scott is a 83 year old bedbound female who presents today for an initial consultation. \par \par She was admitted to [facility] in Oct 2018 for PNA.  Underwent a a CT angio on 10/24/18 and was incedentally noted with a 1.3cm indeterminate Right breast nodule.  At the time I was consulted for the nodule and it was recommended that the patient undergo a biopsy which was not completed.  She is currently a resident at [facility].  Today she is without any complaints. Denies palpable breast masses, nipple discharge, skin changes, inversion or breast pain. Denies constitutional symptoms.\par  \par PMH otherwise significant for stoke (3 years ago) HTN, hypothyroidism, asthma, gout, GERD and depression.\par Denies family history of breast or ovarian cancer.\par \par Menarche: unknown\par \par Age at first pregnancy:unknown\par \par Internist: Dr. Luyl Chung [phone]

## 2019-01-08 NOTE — ASSESSMENT
[FreeTextEntry1] : 83 year old bedboud female s/p stroke approximately 3 years ago noted with an indeterminate 1.5cm mass on CT angio in October 2018 while admitted to [facility].  I was consulted back then and recommended biopsy while in hospital however not completed.\par \par Plan:\par Discussed case with HCP Luis Scott (son) who agrees with recommendation of biopsy.\par US guided biopsy ordered.\par Discussed plan with pt RN at [facility], who will communicate with the MD \par Patient to follow up in office after after biopsy for results.

## 2019-01-08 NOTE — CONSULT LETTER
[Dear  ___] : Dear  [unfilled], [Consult Letter:] : I had the pleasure of evaluating your patient, [unfilled]. [Please see my note below.] : Please see my note below. [Consult Closing:] : Thank you very much for allowing me to participate in the care of this patient.  If you have any questions, please do not hesitate to contact me. [Sincerely,] : Sincerely, [FreeTextEntry2] : Luly Chung MD [FreeTextEntry3] : Dixon Hollis MD, FICS, FACS\par , Surgical Oncology\par [facility]\par [address]\[place]- [address]\par \par [address]\par [place], [place]- [address]\par (mob) [phone]\par (o) [phone]\par (f) [phone]

## 2019-02-04 ENCOUNTER — RESULT REVIEW (OUTPATIENT)
Age: 84
End: 2019-02-04

## 2019-02-04 ENCOUNTER — APPOINTMENT (OUTPATIENT)
Dept: ULTRASOUND IMAGING | Facility: IMAGING CENTER | Age: 84
End: 2019-02-04

## 2019-02-04 ENCOUNTER — OUTPATIENT (OUTPATIENT)
Dept: OUTPATIENT SERVICES | Facility: HOSPITAL | Age: 84
LOS: 1 days | End: 2019-02-04
Payer: COMMERCIAL

## 2019-02-04 DIAGNOSIS — N63.10 UNSPECIFIED LUMP IN THE RIGHT BREAST, UNSPECIFIED QUADRANT: ICD-10-CM

## 2019-02-04 DIAGNOSIS — N63.20 UNSPECIFIED LUMP IN THE LEFT BREAST, UNSPECIFIED QUADRANT: ICD-10-CM

## 2019-02-04 PROCEDURE — 88360 TUMOR IMMUNOHISTOCHEM/MANUAL: CPT | Mod: 26

## 2019-02-04 PROCEDURE — 76641 ULTRASOUND BREAST COMPLETE: CPT

## 2019-02-04 PROCEDURE — A4648: CPT

## 2019-02-04 PROCEDURE — 88305 TISSUE EXAM BY PATHOLOGIST: CPT

## 2019-02-04 PROCEDURE — 88360 TUMOR IMMUNOHISTOCHEM/MANUAL: CPT

## 2019-02-04 PROCEDURE — 19084 BX BREAST ADD LESION US IMAG: CPT | Mod: LT

## 2019-02-04 PROCEDURE — 88377 M/PHMTRC ALYS ISHQUANT/SEMIQ: CPT | Mod: 26

## 2019-02-04 PROCEDURE — 76641 ULTRASOUND BREAST COMPLETE: CPT | Mod: 26,RT

## 2019-02-04 PROCEDURE — 19083 BX BREAST 1ST LESION US IMAG: CPT | Mod: RT

## 2019-02-04 PROCEDURE — 19084 BX BREAST ADD LESION US IMAG: CPT

## 2019-02-04 PROCEDURE — 19083 BX BREAST 1ST LESION US IMAG: CPT

## 2019-02-04 PROCEDURE — 88305 TISSUE EXAM BY PATHOLOGIST: CPT | Mod: 26

## 2019-02-04 PROCEDURE — 88377 M/PHMTRC ALYS ISHQUANT/SEMIQ: CPT

## 2019-02-19 ENCOUNTER — APPOINTMENT (OUTPATIENT)
Dept: SURGICAL ONCOLOGY | Facility: CLINIC | Age: 84
End: 2019-02-19
Payer: MEDICARE

## 2019-02-26 ENCOUNTER — APPOINTMENT (OUTPATIENT)
Dept: SURGICAL ONCOLOGY | Facility: CLINIC | Age: 84
End: 2019-02-26
Payer: MEDICARE

## 2019-02-26 VITALS
BODY MASS INDEX: 29.88 KG/M2 | SYSTOLIC BLOOD PRESSURE: 136 MMHG | HEIGHT: 64 IN | DIASTOLIC BLOOD PRESSURE: 78 MMHG | RESPIRATION RATE: 18 BRPM | HEART RATE: 83 BPM | WEIGHT: 175 LBS

## 2019-02-26 PROCEDURE — 99215 OFFICE O/P EST HI 40 MIN: CPT

## 2019-02-27 ENCOUNTER — FORM ENCOUNTER (OUTPATIENT)
Age: 84
End: 2019-02-27

## 2019-03-18 ENCOUNTER — EMERGENCY (EMERGENCY)
Facility: HOSPITAL | Age: 84
LOS: 1 days | Discharge: HOME | End: 2019-03-18
Attending: EMERGENCY MEDICINE | Admitting: EMERGENCY MEDICINE
Payer: MEDICARE

## 2019-03-18 VITALS
HEART RATE: 85 BPM | SYSTOLIC BLOOD PRESSURE: 120 MMHG | DIASTOLIC BLOOD PRESSURE: 67 MMHG | RESPIRATION RATE: 20 BRPM | TEMPERATURE: 98 F | OXYGEN SATURATION: 98 %

## 2019-03-18 VITALS
DIASTOLIC BLOOD PRESSURE: 74 MMHG | RESPIRATION RATE: 18 BRPM | OXYGEN SATURATION: 95 % | HEART RATE: 79 BPM | TEMPERATURE: 98 F | SYSTOLIC BLOOD PRESSURE: 127 MMHG

## 2019-03-18 LAB
ALBUMIN SERPL ELPH-MCNC: 3.6 G/DL — SIGNIFICANT CHANGE UP (ref 3.3–5)
ALP SERPL-CCNC: 89 U/L — SIGNIFICANT CHANGE UP (ref 40–120)
ALT FLD-CCNC: 15 U/L — SIGNIFICANT CHANGE UP (ref 4–33)
ANION GAP SERPL CALC-SCNC: 13 MMO/L — SIGNIFICANT CHANGE UP (ref 7–14)
AST SERPL-CCNC: 16 U/L — SIGNIFICANT CHANGE UP (ref 4–32)
BASE EXCESS BLDV CALC-SCNC: 4.9 MMOL/L — SIGNIFICANT CHANGE UP
BASOPHILS # BLD AUTO: 0.06 K/UL — SIGNIFICANT CHANGE UP (ref 0–0.2)
BASOPHILS NFR BLD AUTO: 0.7 % — SIGNIFICANT CHANGE UP (ref 0–2)
BILIRUB SERPL-MCNC: 0.5 MG/DL — SIGNIFICANT CHANGE UP (ref 0.2–1.2)
BLD GP AB SCN SERPL QL: NEGATIVE — SIGNIFICANT CHANGE UP
BLOOD GAS VENOUS - CREATININE: 0.71 MG/DL — SIGNIFICANT CHANGE UP (ref 0.5–1.3)
BUN SERPL-MCNC: 29 MG/DL — HIGH (ref 7–23)
CALCIUM SERPL-MCNC: 9.4 MG/DL — SIGNIFICANT CHANGE UP (ref 8.4–10.5)
CHLORIDE BLDV-SCNC: 110 MMOL/L — HIGH (ref 96–108)
CHLORIDE SERPL-SCNC: 104 MMOL/L — SIGNIFICANT CHANGE UP (ref 98–107)
CO2 SERPL-SCNC: 26 MMOL/L — SIGNIFICANT CHANGE UP (ref 22–31)
CREAT SERPL-MCNC: 0.8 MG/DL — SIGNIFICANT CHANGE UP (ref 0.5–1.3)
EOSINOPHIL # BLD AUTO: 0.34 K/UL — SIGNIFICANT CHANGE UP (ref 0–0.5)
EOSINOPHIL NFR BLD AUTO: 3.8 % — SIGNIFICANT CHANGE UP (ref 0–6)
GAS PNL BLDV: 139 MMOL/L — SIGNIFICANT CHANGE UP (ref 136–146)
GLUCOSE BLDV-MCNC: 98 — SIGNIFICANT CHANGE UP (ref 70–99)
GLUCOSE SERPL-MCNC: 99 MG/DL — SIGNIFICANT CHANGE UP (ref 70–99)
HCO3 BLDV-SCNC: 29 MMOL/L — HIGH (ref 20–27)
HCT VFR BLD CALC: 33.3 % — LOW (ref 34.5–45)
HCT VFR BLDV CALC: 32 % — LOW (ref 34.5–45)
HGB BLD-MCNC: 10 G/DL — LOW (ref 11.5–15.5)
HGB BLDV-MCNC: 10.4 G/DL — LOW (ref 11.5–15.5)
IMM GRANULOCYTES NFR BLD AUTO: 0.7 % — SIGNIFICANT CHANGE UP (ref 0–1.5)
LACTATE BLDV-MCNC: 1.8 MMOL/L — SIGNIFICANT CHANGE UP (ref 0.5–2)
LYMPHOCYTES # BLD AUTO: 1.79 K/UL — SIGNIFICANT CHANGE UP (ref 1–3.3)
LYMPHOCYTES # BLD AUTO: 20.1 % — SIGNIFICANT CHANGE UP (ref 13–44)
MAGNESIUM SERPL-MCNC: 2.4 MG/DL — SIGNIFICANT CHANGE UP (ref 1.6–2.6)
MCHC RBC-ENTMCNC: 30 % — LOW (ref 32–36)
MCHC RBC-ENTMCNC: 31.3 PG — SIGNIFICANT CHANGE UP (ref 27–34)
MCV RBC AUTO: 104.1 FL — HIGH (ref 80–100)
MONOCYTES # BLD AUTO: 0.62 K/UL — SIGNIFICANT CHANGE UP (ref 0–0.9)
MONOCYTES NFR BLD AUTO: 7 % — SIGNIFICANT CHANGE UP (ref 2–14)
NEUTROPHILS # BLD AUTO: 6.02 K/UL — SIGNIFICANT CHANGE UP (ref 1.8–7.4)
NEUTROPHILS NFR BLD AUTO: 67.7 % — SIGNIFICANT CHANGE UP (ref 43–77)
NRBC # FLD: 0 K/UL — LOW (ref 25–125)
PCO2 BLDV: 43 MMHG — SIGNIFICANT CHANGE UP (ref 41–51)
PH BLDV: 7.44 PH — HIGH (ref 7.32–7.43)
PHOSPHATE SERPL-MCNC: 3.2 MG/DL — SIGNIFICANT CHANGE UP (ref 2.5–4.5)
PLATELET # BLD AUTO: 203 K/UL — SIGNIFICANT CHANGE UP (ref 150–400)
PMV BLD: 12.8 FL — SIGNIFICANT CHANGE UP (ref 7–13)
PO2 BLDV: 76 MMHG — HIGH (ref 35–40)
POTASSIUM BLDV-SCNC: 3.6 MMOL/L — SIGNIFICANT CHANGE UP (ref 3.4–4.5)
POTASSIUM SERPL-MCNC: 3.7 MMOL/L — SIGNIFICANT CHANGE UP (ref 3.5–5.3)
POTASSIUM SERPL-SCNC: 3.7 MMOL/L — SIGNIFICANT CHANGE UP (ref 3.5–5.3)
PROT SERPL-MCNC: 6.9 G/DL — SIGNIFICANT CHANGE UP (ref 6–8.3)
RBC # BLD: 3.2 M/UL — LOW (ref 3.8–5.2)
RBC # FLD: 14.8 % — HIGH (ref 10.3–14.5)
RH IG SCN BLD-IMP: POSITIVE — SIGNIFICANT CHANGE UP
SAO2 % BLDV: 96 % — HIGH (ref 60–85)
SODIUM SERPL-SCNC: 143 MMOL/L — SIGNIFICANT CHANGE UP (ref 135–145)
WBC # BLD: 8.89 K/UL — SIGNIFICANT CHANGE UP (ref 3.8–10.5)
WBC # FLD AUTO: 8.89 K/UL — SIGNIFICANT CHANGE UP (ref 3.8–10.5)

## 2019-03-18 PROCEDURE — 99284 EMERGENCY DEPT VISIT MOD MDM: CPT | Mod: GC

## 2019-03-18 NOTE — ED ADULT NURSE NOTE - OBJECTIVE STATEMENT
pt received to 17, aox3.  pt sent to ED for evaluation of 1 episode of coffee ground emesis.  pt reports she was given medication and felt nauseous and vomited.  pt presents with a black tongue and a peg tube.  pt denies abd pain and diarrhea.  SL placed, labs sent, pt turned and repositioned, pt had large soft brown BM, skin intact.  pt has weakness on the left side and air boot in place to left foot.  awaiting further orders, will monitor

## 2019-03-18 NOTE — ED PROVIDER NOTE - NS ED ROS FT
General: No fevers, chills  HEENT: No headaches, nasal congestion  CVS: No chest pain, palpitations   Resp: No shortness of breath, wheezing   GI: No abdominal pain, nausea, vomiting, changes in bowel habits  Renal: No dysuria, hematuria   Ext: No edema, no claudication   Skin: No rashes or itching   Neuro: No confusion, + chronic Left sided weakness

## 2019-03-18 NOTE — ED ADULT NURSE REASSESSMENT NOTE - NS ED NURSE REASSESS COMMENT FT1
Report received from JASPAL Jaramillo. Pt has no acute complaints at this time, pt is D/Fer, due to be picked up at 11pm to go back to [place]. Pt denies pain

## 2019-03-18 NOTE — ED ADULT TRIAGE NOTE - CHIEF COMPLAINT QUOTE
Patient brought to ER by EMS for c/o 1 episode of coffee ground emesis. Patient brought to ER by EMS for c/o 1 episode of coffee ground emesis. Pt not on blood thinners.

## 2019-03-18 NOTE — ED PROVIDER NOTE - ATTENDING CONTRIBUTION TO CARE
Attending note:   After face to face evaluation of this patient, I concur with above noted hx, pe, and care plan for this patient.  82 y/o F with peg with brown medicine given by peg today with emesis of same medication.   Patient with no complaints.    Patient sent as r/o gi bleed with brown medication on tongue.   evaluation in progress

## 2019-03-18 NOTE — ED ADULT NURSE NOTE - NSIMPLEMENTINTERV_GEN_ALL_ED
Implemented All Fall with Harm Risk Interventions:  [place] to call system. Call bell, personal items and telephone within reach. Instruct patient to call for assistance. Room bathroom lighting operational. Non-slip footwear when patient is off stretcher. Physically safe environment: no spills, clutter or unnecessary equipment. Stretcher in lowest position, wheels locked, appropriate side rails in place. Provide visual cue, wrist band, yellow gown, etc. Monitor gait and stability. Monitor for mental status changes and reorient to person, place, and time. Review medications for side effects contributing to fall risk. Reinforce activity limits and safety measures with patient and family. Provide visual clues: red socks.

## 2019-03-18 NOTE — ED PROVIDER NOTE - PHYSICAL EXAMINATION
General: NAD  HEAD: Atraumatic, Normocephalic  ENTconjunctiva and sclera clear, neck supple, coffee ground residual in the oropharynx   Chest: lungs clear to auscultation bilaterally, no crackles, wheezing, rhonchi   Abd: Nl BS, soft, NT, ND , PEG in place   Ext: 2+ peripheral pulses, no clubbing, cyanosis, edema  Neuro: AOx3. LLE immobile, minimal mobility in LUE. nl Rside   Skin: Warm, dry, intact, no rash  Psych: Affect nl

## 2019-03-18 NOTE — ED PROVIDER NOTE - OBJECTIVE STATEMENT
82 yo F w CVA c/b L sided weakness, bedbound due to LLE immobility, PEG for dysphagia HTN sent from NH due to concern of coffee ground emesis. Patient reports that occasionally she spits up the food she gets from the PEG. today she was receiving some brown colored medication and she subsequently spit itup. However the NH was concerned she might have UGI bleed as it was coffee ground. Pt denies any sob, dizziness, palpitations, hx of GI bleed.

## 2019-03-18 NOTE — ED PROVIDER NOTE - CLINICAL SUMMARY MEDICAL DECISION MAKING FREE TEXT BOX
82 yo w CVA, PEG, HTN sent by NH for concern of coffee groudn emesis  - likely the medication regurgitated as pt has the same color substance in PEG. But will r/o blood loss anemia - labs, type and screen, reassess

## 2019-03-20 ENCOUNTER — FORM ENCOUNTER (OUTPATIENT)
Age: 84
End: 2019-03-20

## 2019-03-25 ENCOUNTER — APPOINTMENT (OUTPATIENT)
Dept: ULTRASOUND IMAGING | Facility: IMAGING CENTER | Age: 84
End: 2019-03-25

## 2019-03-25 ENCOUNTER — APPOINTMENT (OUTPATIENT)
Dept: SURGICAL ONCOLOGY | Facility: HOSPITAL | Age: 84
End: 2019-03-25

## 2019-03-25 NOTE — CONSULT LETTER
[Dear  ___] : Dear  [unfilled], [( Thank you for referring [unfilled] for consultation for _____ )] : Thank you for referring [unfilled] for consultation for [unfilled] [Please see my note below.] : Please see my note below. [Sincerely,] : Sincerely, [FreeTextEntry3] : Dixon Hollis MD, FICS, FACS\par , Surgical Oncology\par [facility] [facility] [employer] [facility]\par [address]\[place]- [address]\par \par [address]\par [place], [place]- [address]\par \par (mob) [phone]\par (o) [phone]\par (f) [phone]\par

## 2019-03-25 NOTE — HISTORY OF PRESENT ILLNESS
[de-identified] : Ms. Scott is a 83 year old bedbound female who presents today for a follow up visit. \par \par She was admitted to [facility] in Oct 2018 for PNA.  Underwent a a CT angio on 10/24/18 and was incidentally noted with a 1.3cm indeterminate Right breast nodule.  At the time I was consulted for the nodule and it was recommended that the patient undergo a biopsy which was not completed.  She is currently a resident at [facility].  On initial in office visit she denied  nipple discharge, skin changes, inversion or breast pain. Denies constitutional symptoms.\par \par PMH otherwise significant for stoke (3 years ago) HTN, hypothyroidism, asthma, gout, GERD and depression.\par Denies family history of breast or ovarian cancer.\par \par Menarche: unknown\par \par Age at first pregnancy:unknown\par \par Internist: Dr. Luly Chung [phone]\par \par \par Interval History:\par 19:  She is s/p biopsy of Right breast mass 2:00 which revealed invasive moderately differentiated ductal carcinoma (Er+/Pr+/Her2-)measuring at least 1.1cm.  Biopsy of Left breast mass 8:00 benign.\par \par

## 2019-03-25 NOTE — ASSESSMENT
[FreeTextEntry1] : 83 year old bedboud female s/p stroke approximately 3 years ago noted with an indeterminate 1.5cm mass on CT angio in October 2018 while admitted to [facility]. Recent biopsy of Right breast revealed invasive ductal carcinoma\par (Er+/Pr+/Her2-)measuring at least 1.1cm.  Previously palpable mass no longer appreciated today.\par \par Plan:\par Since mass no longer appreciated I will plan for a Mery guided Right breast lumpectomy, with SLNB\par I have dicussed BCT, ipsilateral and bilateral mastectomy with pt and pt prefers to proceed with lumpectomy with SLNB.\par Care plan discussed with Luis Scott- pt son on the phone.\par I have discussed the diagnosis, therapeutic plan and options with the patient at length. Patient expressed verbal understanding to proceed with proposed plan. All questions answered.\par \par 3/25/19\par I discussed the plan with pt son again over the phone per his request. He and his mother (patient) had an extensive discussion and have changed their operative option. The wish to proceed with right total mastectomy with sentinel lymph node biopsy possible axillary dissection with no plastic reconstruction.\par Will plan for proposed plan accordingly.\par \par I have discussed the risks, benefits, alternatives, complications including but not limited bleeding, infection, damage to adjacent structures, recurrence to the patient in detail. Patient expressed verbal understanding. Written informed consent to be obtained in the preoperative period.\par \par

## 2019-03-25 NOTE — PHYSICAL EXAM
[Normal] : supple, no neck mass and thyroid not enlarged [Normal Supraclavicular Lymph Nodes] : normal supraclavicular lymph nodes [Normal Axillary Lymph Nodes] : normal axillary lymph nodes [Normal] : oriented to person, place and time, with appropriate affect [de-identified] : Previously palpable 2cm Right breast mass not appreciated today.

## 2019-03-31 ENCOUNTER — FORM ENCOUNTER (OUTPATIENT)
Age: 84
End: 2019-03-31

## 2019-04-01 ENCOUNTER — APPOINTMENT (OUTPATIENT)
Dept: NUCLEAR MEDICINE | Facility: HOSPITAL | Age: 84
End: 2019-04-01

## 2019-04-01 ENCOUNTER — INPATIENT (INPATIENT)
Facility: HOSPITAL | Age: 84
LOS: 1 days | Discharge: NURSING, REHAB OR LONG-TERM CARE | End: 2019-04-03
Attending: SURGERY | Admitting: SURGERY
Payer: MEDICARE

## 2019-04-01 ENCOUNTER — APPOINTMENT (OUTPATIENT)
Dept: SURGICAL ONCOLOGY | Facility: HOSPITAL | Age: 84
End: 2019-04-01

## 2019-04-01 ENCOUNTER — RESULT REVIEW (OUTPATIENT)
Age: 84
End: 2019-04-01

## 2019-04-01 VITALS
RESPIRATION RATE: 18 BRPM | HEIGHT: 64 IN | HEART RATE: 88 BPM | DIASTOLIC BLOOD PRESSURE: 87 MMHG | SYSTOLIC BLOOD PRESSURE: 134 MMHG | OXYGEN SATURATION: 96 % | TEMPERATURE: 98 F | WEIGHT: 201.94 LBS

## 2019-04-01 DIAGNOSIS — C50.911 MALIGNANT NEOPLASM OF UNSPECIFIED SITE OF RIGHT FEMALE BREAST: ICD-10-CM

## 2019-04-01 PROCEDURE — 38900 IO MAP OF SENT LYMPH NODE: CPT | Mod: RT

## 2019-04-01 PROCEDURE — 88331 PATH CONSLTJ SURG 1 BLK 1SPC: CPT | Mod: 26

## 2019-04-01 PROCEDURE — 38792 RA TRACER ID OF SENTINL NODE: CPT | Mod: RT,59

## 2019-04-01 PROCEDURE — 38740 REMOVE ARMPIT LYMPH NODES: CPT | Mod: 59,RT

## 2019-04-01 PROCEDURE — 38525 BIOPSY/REMOVAL LYMPH NODES: CPT | Mod: RT,59

## 2019-04-01 PROCEDURE — 88307 TISSUE EXAM BY PATHOLOGIST: CPT | Mod: 26

## 2019-04-01 PROCEDURE — 38308 INCISION OF LYMPH CHANNELS: CPT | Mod: RT

## 2019-04-01 PROCEDURE — 19303 MAST SIMPLE COMPLETE: CPT | Mod: RT

## 2019-04-01 PROCEDURE — 88309 TISSUE EXAM BY PATHOLOGIST: CPT | Mod: 26

## 2019-04-01 RX ORDER — ACETAMINOPHEN 500 MG
1000 TABLET ORAL ONCE
Qty: 0 | Refills: 0 | Status: COMPLETED | OUTPATIENT
Start: 2019-04-02 | End: 2019-04-02

## 2019-04-01 RX ORDER — OXYCODONE HYDROCHLORIDE 5 MG/1
10 TABLET ORAL ONCE
Qty: 0 | Refills: 0 | Status: DISCONTINUED | OUTPATIENT
Start: 2019-04-01 | End: 2019-04-01

## 2019-04-01 RX ORDER — FENTANYL CITRATE 50 UG/ML
25 INJECTION INTRAVENOUS
Qty: 0 | Refills: 0 | Status: DISCONTINUED | OUTPATIENT
Start: 2019-04-01 | End: 2019-04-01

## 2019-04-01 RX ORDER — FAMOTIDINE 10 MG/ML
40 INJECTION INTRAVENOUS AT BEDTIME
Qty: 0 | Refills: 0 | Status: DISCONTINUED | OUTPATIENT
Start: 2019-04-01 | End: 2019-04-03

## 2019-04-01 RX ORDER — CITALOPRAM 10 MG/1
40 TABLET, FILM COATED ORAL DAILY
Qty: 0 | Refills: 0 | Status: DISCONTINUED | OUTPATIENT
Start: 2019-04-01 | End: 2019-04-03

## 2019-04-01 RX ORDER — ACETAMINOPHEN 500 MG
1000 TABLET ORAL ONCE
Qty: 0 | Refills: 0 | Status: COMPLETED | OUTPATIENT
Start: 2019-04-01 | End: 2019-04-01

## 2019-04-01 RX ORDER — OXYCODONE HYDROCHLORIDE 5 MG/1
5 TABLET ORAL EVERY 6 HOURS
Qty: 0 | Refills: 0 | Status: DISCONTINUED | OUTPATIENT
Start: 2019-04-01 | End: 2019-04-03

## 2019-04-01 RX ORDER — CYCLOBENZAPRINE HYDROCHLORIDE 10 MG/1
5 TABLET, FILM COATED ORAL THREE TIMES A DAY
Qty: 0 | Refills: 0 | Status: DISCONTINUED | OUTPATIENT
Start: 2019-04-01 | End: 2019-04-03

## 2019-04-01 RX ORDER — AMLODIPINE BESYLATE 2.5 MG/1
10 TABLET ORAL DAILY
Qty: 0 | Refills: 0 | Status: DISCONTINUED | OUTPATIENT
Start: 2019-04-01 | End: 2019-04-03

## 2019-04-01 RX ORDER — METOPROLOL TARTRATE 50 MG
25 TABLET ORAL
Qty: 0 | Refills: 0 | Status: DISCONTINUED | OUTPATIENT
Start: 2019-04-01 | End: 2019-04-03

## 2019-04-01 RX ORDER — ENOXAPARIN SODIUM 100 MG/ML
40 INJECTION SUBCUTANEOUS DAILY
Qty: 0 | Refills: 0 | Status: DISCONTINUED | OUTPATIENT
Start: 2019-04-01 | End: 2019-04-03

## 2019-04-01 RX ORDER — CHLORHEXIDINE GLUCONATE 213 G/1000ML
15 SOLUTION TOPICAL
Qty: 0 | Refills: 0 | Status: DISCONTINUED | OUTPATIENT
Start: 2019-04-01 | End: 2019-04-03

## 2019-04-01 RX ORDER — LEVETIRACETAM 250 MG/1
750 TABLET, FILM COATED ORAL
Qty: 0 | Refills: 0 | Status: DISCONTINUED | OUTPATIENT
Start: 2019-04-01 | End: 2019-04-03

## 2019-04-01 RX ORDER — ALLOPURINOL 300 MG
300 TABLET ORAL DAILY
Qty: 0 | Refills: 0 | Status: DISCONTINUED | OUTPATIENT
Start: 2019-04-01 | End: 2019-04-03

## 2019-04-01 RX ORDER — MORPHINE SULFATE 50 MG/1
1 CAPSULE, EXTENDED RELEASE ORAL EVERY 4 HOURS
Qty: 0 | Refills: 0 | Status: DISCONTINUED | OUTPATIENT
Start: 2019-04-01 | End: 2019-04-03

## 2019-04-01 RX ORDER — LEVOTHYROXINE SODIUM 125 MCG
50 TABLET ORAL DAILY
Qty: 0 | Refills: 0 | Status: DISCONTINUED | OUTPATIENT
Start: 2019-04-01 | End: 2019-04-03

## 2019-04-01 RX ORDER — SODIUM CHLORIDE 9 MG/ML
3 INJECTION INTRAMUSCULAR; INTRAVENOUS; SUBCUTANEOUS EVERY 8 HOURS
Qty: 0 | Refills: 0 | Status: DISCONTINUED | OUTPATIENT
Start: 2019-04-01 | End: 2019-04-03

## 2019-04-01 RX ORDER — OXYCODONE HYDROCHLORIDE 5 MG/1
10 TABLET ORAL EVERY 6 HOURS
Qty: 0 | Refills: 0 | Status: DISCONTINUED | OUTPATIENT
Start: 2019-04-01 | End: 2019-04-03

## 2019-04-01 RX ORDER — BUDESONIDE AND FORMOTEROL FUMARATE DIHYDRATE 160; 4.5 UG/1; UG/1
2 AEROSOL RESPIRATORY (INHALATION)
Qty: 0 | Refills: 0 | Status: DISCONTINUED | OUTPATIENT
Start: 2019-04-01 | End: 2019-04-03

## 2019-04-01 RX ORDER — SODIUM CHLORIDE 9 MG/ML
1000 INJECTION, SOLUTION INTRAVENOUS
Qty: 0 | Refills: 0 | Status: DISCONTINUED | OUTPATIENT
Start: 2019-04-01 | End: 2019-04-01

## 2019-04-01 RX ORDER — ONDANSETRON 8 MG/1
4 TABLET, FILM COATED ORAL ONCE
Qty: 0 | Refills: 0 | Status: DISCONTINUED | OUTPATIENT
Start: 2019-04-01 | End: 2019-04-01

## 2019-04-01 RX ORDER — FENTANYL CITRATE 50 UG/ML
50 INJECTION INTRAVENOUS
Qty: 0 | Refills: 0 | Status: DISCONTINUED | OUTPATIENT
Start: 2019-04-01 | End: 2019-04-01

## 2019-04-01 RX ADMIN — Medication 1000 MILLIGRAM(S): at 21:19

## 2019-04-01 RX ADMIN — ENOXAPARIN SODIUM 40 MILLIGRAM(S): 100 INJECTION SUBCUTANEOUS at 18:36

## 2019-04-01 RX ADMIN — LEVETIRACETAM 750 MILLIGRAM(S): 250 TABLET, FILM COATED ORAL at 18:37

## 2019-04-01 RX ADMIN — FAMOTIDINE 40 MILLIGRAM(S): 10 INJECTION INTRAVENOUS at 22:19

## 2019-04-01 RX ADMIN — CHLORHEXIDINE GLUCONATE 15 MILLILITER(S): 213 SOLUTION TOPICAL at 18:37

## 2019-04-01 RX ADMIN — Medication 25 MILLIGRAM(S): at 18:36

## 2019-04-01 RX ADMIN — SODIUM CHLORIDE 3 MILLILITER(S): 9 INJECTION INTRAMUSCULAR; INTRAVENOUS; SUBCUTANEOUS at 22:40

## 2019-04-01 RX ADMIN — Medication 300 MILLIGRAM(S): at 18:35

## 2019-04-01 RX ADMIN — Medication 400 MILLIGRAM(S): at 19:37

## 2019-04-01 RX ADMIN — BUDESONIDE AND FORMOTEROL FUMARATE DIHYDRATE 2 PUFF(S): 160; 4.5 AEROSOL RESPIRATORY (INHALATION) at 23:03

## 2019-04-01 RX ADMIN — CITALOPRAM 40 MILLIGRAM(S): 10 TABLET, FILM COATED ORAL at 18:36

## 2019-04-01 NOTE — H&P ADULT - HISTORY OF PRESENT ILLNESS
83F PMH CVA, HLD, Gout, HTN, MI, presenting for electively scheduled R mastectomy with sentinel LN biopsy. Patient was admitted to [facility] in Oct 2018 for PNA. During her hospital stay she underwent a a CT angio (10/24), with incidental finding of  a 1.3cm indeterminate Right breast nodule. Patient was seen as an outpatient for follow up, and has since had biopsy which revealed invasive ductal carcinoma measuring at least 1.1cm.

## 2019-04-01 NOTE — H&P ADULT - ATTENDING COMMENTS
- I have seen and examined the patient on rounds. Patient's chart, labs, images and reports reviewed.  Pt seen in SDA  No change in physical exam  Pt indicates clearly her intention to proceed with R total mastectomy SLNB possible dissection  - I have discussed the diagnosis with the patient in detail. Patient expressed verbal understanding and willingness to proceed with proposed plan. All questions answered  -Risks, benefits, alternatives, complications including but not limited to bleeding, infection, injury to adjacent structures, need for further procedures explained to patient in detail. Patient expressed verbal understanding and willingness to proceed with proposed plan.

## 2019-04-01 NOTE — PROGRESS NOTE ADULT - SUBJECTIVE AND OBJECTIVE BOX
Surgery POST-OP CHECK NOTE:    Procedure: Right mastectomy and biopsy of right sentinel lymph node    Subjective: Resting comfortably in bed on floor unit. Pain controlled. No N/V, CP, or SOB.    Vital Signs Last 24 Hrs  T(C): 37 (01 Apr 2019 15:06), Max: 37 (01 Apr 2019 15:06)  T(F): 98.6 (01 Apr 2019 15:06), Max: 98.6 (01 Apr 2019 15:06)  HR: 99 (01 Apr 2019 18:02) (83 - 101)  BP: 132/58 (01 Apr 2019 18:02) (101/84 - 136/51)  BP(mean): 69 (01 Apr 2019 13:30) (62 - 88)  RR: 20 (01 Apr 2019 18:02) (13 - 26)  SpO2: 95% (01 Apr 2019 18:02) (92% - 99%)  I&O's Summary    01 Apr 2019 07:01  -  01 Apr 2019 20:37  --------------------------------------------------------  IN: 750 mL / OUT: 122 mL / NET: 628 mL                            10.2   5.95  )-----------( 209      ( 01 Apr 2019 09:36 )             32.3     04-01    142  |  103  |  23  ----------------------------<  117<H>  3.9   |  24  |  0.77    Ca    9.9      01 Apr 2019 07:17    TPro  6.8  /  Alb  3.6  /  TBili  0.2  /  DBili  x   /  AST  18  /  ALT  17  /  AlkPhos  91  04-01       PHYSICAL EXAM:  Gen: NAD, well-developed  Neuro: AAOX3, PERRL, CNII-XII grossly intact  CV: Pulse regularly present  Pulm: normal respiratory effort  GI: abd soft, nontender, nondistended  Ext: 2+ pulses throughout Surgery POST-OP CHECK NOTE:    Procedure: Right mastectomy and biopsy of right sentinel lymph node    Subjective: Resting comfortably in bed on floor unit. Pain controlled. No N/V, CP, or SOB.    Vital Signs Last 24 Hrs  T(C): 37 (01 Apr 2019 15:06), Max: 37 (01 Apr 2019 15:06)  T(F): 98.6 (01 Apr 2019 15:06), Max: 98.6 (01 Apr 2019 15:06)  HR: 99 (01 Apr 2019 18:02) (83 - 101)  BP: 132/58 (01 Apr 2019 18:02) (101/84 - 136/51)  BP(mean): 69 (01 Apr 2019 13:30) (62 - 88)  RR: 20 (01 Apr 2019 18:02) (13 - 26)  SpO2: 95% (01 Apr 2019 18:02) (92% - 99%)  I&O's Summary    01 Apr 2019 07:01  -  01 Apr 2019 20:37  --------------------------------------------------------  IN: 750 mL / OUT: 122 mL / NET: 628 mL                            10.2   5.95  )-----------( 209      ( 01 Apr 2019 09:36 )             32.3     04-01    142  |  103  |  23  ----------------------------<  117<H>  3.9   |  24  |  0.77    Ca    9.9      01 Apr 2019 07:17    TPro  6.8  /  Alb  3.6  /  TBili  0.2  /  DBili  x   /  AST  18  /  ALT  17  /  AlkPhos  91  04-01       PHYSICAL EXAM:  Gen: NAD, well-developed  Neuro: AAOX3, PERRL, CNII-XII grossly intact  Chest: right mastectomy site with pressure dressing c/d/i, right axilla w/ 2 JPs serosang  CV: Pulse regularly present  Pulm: normal respiratory effort  GI: abd soft, nontender, nondistended  Ext: 2+ pulses throughout

## 2019-04-01 NOTE — ASU PATIENT PROFILE, ADULT - MENTAL HEALTH CONDITIONS/SYMPTOMS, PROFILE
[facility]  [address] [address]      Olga Castañeda :2003 MRN:[id number]    2018 Time Session Began: 3:00p  Time Session Ended: 3:45p    Session Type:45 Minute Therapy (46118)    Others Present: patient's mom attended the last 15 minutes to review    Intervention: Behavioral, Cognitive, Supportive    Suicide/Homicide/Violence Ideation: No    If Yes, explain:     Current Outpatient Prescriptions   Medication Sig   • carbamide peroxide (DEBROX) 6.5 % OTIC solution Place 5 drops into both ears as needed (wax).   • MULTIPLE VITAMIN PO Take  by mouth daily.     No current facility-administered medications for this visit.        Change in Medication(s) Reported: No  If Yes, explain:     Patient/Family Education Provided: Yes  Patient/Family Displays Understanding: Yes    If No, explain:     Chief complaint in patient's own words: \"good.\"    Progress Note containing chief complaint and symptoms/problems related to the complaint:    (Data/Action/Response/Plan)    D:  Olga Castañeda, a 14 year old, White, female attended follow up therapy session. Patient dressed and groomed appropriately, eye contact maintained, thoughts clear and goal oriented, oriented to time place and situation, voice of normal tone and rate, affect full; mood congruent, no psychosis. Patient presents in therapy session stating improvement in her mood. Patient states she got upset with her sister on one occasion. Although she yelled at her sister she did not act out physically, which is an improvement. Patient states increased friends at school. She is initiating conversations and feels positive about things. Patient and writer continued to read about and learn about worry. Patient was able to summarize, with help from this writer, what she learned to her mom. Patient's mom states patient continues to make improvements. Will learn about ways to manage worry  next session. Patient states concerns about if these coping skills don't work. Writer encouraged patient to keep trying them. Will keep patient on the wait list for a sooner appointment. Next appointment is not scheduled until the end of March. Patient agreed to get a friend's phone number so that she can call them after school and possibly set up a time to hang out.    A:  Writer offered reflective and active listening skills utilizing a CBT approach to treatment. Writer asked directed and open ended questions to gather information.  Writer will continue to utilize support, education and cognitive behavioral techniques to increase insight and level of functioning.     R:  Patient responded well to reflective and active listening skills. Writer will continue to support the patient utilizing a CBT approach. Overall, patient states she is managing better.    P:  Plan for patient to return in one month. Patient is on the wait list for a sooner appointment.    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

## 2019-04-01 NOTE — ASU PATIENT PROFILE, ADULT - SPIRITUAL CULTURAL, CURRENT SITUATION, PROFILE
Problem: NORMAL   Goal: Total weight loss less than 10% of birth weight  INTERVENTIONS:  - Initiate breastfeeding within first hour after birth. - Encourage rooming-in.  - Assess infant feedings. - Monitor intake and output and daily weight.   - E none

## 2019-04-01 NOTE — PROGRESS NOTE ADULT - ASSESSMENT
83F PMH CVA, HLD, Gout, HTN, MI, presenting for electively scheduled R mastectomy with sentinel LN biopsy. Patient was admitted to [facility] in Oct 2018 for PNA. During her hospital stay she underwent a a CT angio (10/24), with incidental finding of  a 1.3cm indeterminate Right breast nodule. Patient was seen as an outpatient for follow up, and has since had biopsy which revealed invasive ductal carcinoma measuring at least 1.1cm. Now s/p right mastectomy and biopsy of right sentinel lymph node.    Plan  - Pain control: IV Tylenol, morphine 1mg IV push q4h PRN, Oxy IR 5mg and 10gm via PEG tube q6h PRN  - NPO w/ tube feeds  - Monitor ILEANA drains outputs  -   - DVT ppx: Lovenox 83F PMH CVA, HLD, Gout, HTN, MI, presenting for electively scheduled R mastectomy with sentinel LN biopsy. Patient was admitted to [facility] in Oct 2018 for PNA. During her hospital stay she underwent a a CT angio (10/24), with incidental finding of  a 1.3cm indeterminate Right breast nodule. Patient was seen as an outpatient for follow up, and has since had biopsy which revealed invasive ductal carcinoma measuring at least 1.1cm. Now s/p right mastectomy and biopsy of right sentinel lymph node.    Plan  - Pain control: IV Tylenol, morphine 1mg IV push q4h PRN, Oxy IR 5mg and 10gm via PEG tube q6h PRN  - NPO w/ tube feeds  - Monitor ILEANA drains outputs  - Monitor surgical sites for signs of bleeding or hematoma  - DVT ppx: Lovenox

## 2019-04-01 NOTE — BRIEF OPERATIVE NOTE - NSICDXBRIEFPROCEDURE_GEN_ALL_CORE_FT
PROCEDURES:  Biopsy of right sentinel lymph node 01-Apr-2019 12:15:24  Nick Kitchen  Right mastectomy 01-Apr-2019 12:15:15  Nick Kitchen

## 2019-04-01 NOTE — H&P ADULT - NSICDXPASTMEDICALHX_GEN_ALL_CORE_FT
PAST MEDICAL HISTORY:  CVA (cerebral vascular accident)     Dyslipidemia     ETOH abuse     Gout     HTN (hypertension)     MI (myocardial infarction)     Personal history of asbestosis     UTI (lower urinary tract infection)

## 2019-04-01 NOTE — H&P ADULT - NSHPPHYSICALEXAM_GEN_ALL_CORE
Gen: Laying in bed, in NAD  Cardiovascular: normal rate and rhythm, carotid and femoral arteries.   Breasts:. Previously palpable 2cm Right breast mass not appreciated today.   Respiratory: normal respiratory effort, chest percussion and palpation.   Lymphatic: normal supraclavicular lymph nodes and normal axillary lymph nodes.   Musculoskeletal: full range of motion and no deformities appreciated.   Neurological: grossly intact.   Psychiatric: oriented to person, place and time, with appropriate affect.

## 2019-04-01 NOTE — H&P ADULT - ASSESSMENT
83F PMH CVA, HLD, Gout, HTN, MI, presenting for electively scheduled R mastectomy with sentinel LN biopsy.    - Plan for R mastectomy with R SLNBx today without PRS reconstruction

## 2019-04-02 ENCOUNTER — TRANSCRIPTION ENCOUNTER (OUTPATIENT)
Age: 84
End: 2019-04-02

## 2019-04-02 VITALS
DIASTOLIC BLOOD PRESSURE: 48 MMHG | OXYGEN SATURATION: 97 % | HEART RATE: 78 BPM | SYSTOLIC BLOOD PRESSURE: 106 MMHG | RESPIRATION RATE: 19 BRPM | TEMPERATURE: 98 F

## 2019-04-02 LAB
ANION GAP SERPL CALC-SCNC: 13 MMO/L — SIGNIFICANT CHANGE UP (ref 7–14)
BUN SERPL-MCNC: 22 MG/DL — SIGNIFICANT CHANGE UP (ref 7–23)
CALCIUM SERPL-MCNC: 8.9 MG/DL — SIGNIFICANT CHANGE UP (ref 8.4–10.5)
CHLORIDE SERPL-SCNC: 100 MMOL/L — SIGNIFICANT CHANGE UP (ref 98–107)
CO2 SERPL-SCNC: 22 MMOL/L — SIGNIFICANT CHANGE UP (ref 22–31)
CREAT SERPL-MCNC: 0.86 MG/DL — SIGNIFICANT CHANGE UP (ref 0.5–1.3)
GLUCOSE SERPL-MCNC: 180 MG/DL — HIGH (ref 70–99)
HCT VFR BLD CALC: 31.3 % — LOW (ref 34.5–45)
HGB BLD-MCNC: 9.3 G/DL — LOW (ref 11.5–15.5)
MAGNESIUM SERPL-MCNC: 2.2 MG/DL — SIGNIFICANT CHANGE UP (ref 1.6–2.6)
MCHC RBC-ENTMCNC: 29.7 % — LOW (ref 32–36)
MCHC RBC-ENTMCNC: 30.8 PG — SIGNIFICANT CHANGE UP (ref 27–34)
MCV RBC AUTO: 103.6 FL — HIGH (ref 80–100)
NRBC # FLD: 0 K/UL — SIGNIFICANT CHANGE UP (ref 0–0)
PHOSPHATE SERPL-MCNC: 3.2 MG/DL — SIGNIFICANT CHANGE UP (ref 2.5–4.5)
PLATELET # BLD AUTO: 195 K/UL — SIGNIFICANT CHANGE UP (ref 150–400)
PMV BLD: 12.6 FL — SIGNIFICANT CHANGE UP (ref 7–13)
POTASSIUM SERPL-MCNC: 4.4 MMOL/L — SIGNIFICANT CHANGE UP (ref 3.5–5.3)
POTASSIUM SERPL-SCNC: 4.4 MMOL/L — SIGNIFICANT CHANGE UP (ref 3.5–5.3)
RBC # BLD: 3.02 M/UL — LOW (ref 3.8–5.2)
RBC # FLD: 15.1 % — HIGH (ref 10.3–14.5)
SODIUM SERPL-SCNC: 135 MMOL/L — SIGNIFICANT CHANGE UP (ref 135–145)
WBC # BLD: 12.6 K/UL — HIGH (ref 3.8–10.5)
WBC # FLD AUTO: 12.6 K/UL — HIGH (ref 3.8–10.5)

## 2019-04-02 RX ORDER — IPRATROPIUM/ALBUTEROL SULFATE 18-103MCG
3 AEROSOL WITH ADAPTER (GRAM) INHALATION
Qty: 1 | Refills: 0
Start: 2019-04-02 | End: 2019-05-01

## 2019-04-02 RX ORDER — ACETAMINOPHEN 500 MG
0 TABLET ORAL
Qty: 0 | Refills: 0 | COMMUNITY

## 2019-04-02 RX ORDER — OXYCODONE HYDROCHLORIDE 5 MG/1
1 TABLET ORAL
Qty: 0 | Refills: 0 | COMMUNITY

## 2019-04-02 RX ORDER — OXYCODONE HYDROCHLORIDE 5 MG/1
5 TABLET ORAL
Qty: 0 | Refills: 0 | DISCHARGE
Start: 2019-04-02

## 2019-04-02 RX ADMIN — Medication 25 MILLIGRAM(S): at 05:25

## 2019-04-02 RX ADMIN — Medication 400 MILLIGRAM(S): at 12:43

## 2019-04-02 RX ADMIN — BUDESONIDE AND FORMOTEROL FUMARATE DIHYDRATE 2 PUFF(S): 160; 4.5 AEROSOL RESPIRATORY (INHALATION) at 08:43

## 2019-04-02 RX ADMIN — Medication 50 MICROGRAM(S): at 06:45

## 2019-04-02 RX ADMIN — Medication 1000 MILLIGRAM(S): at 07:33

## 2019-04-02 RX ADMIN — CHLORHEXIDINE GLUCONATE 15 MILLILITER(S): 213 SOLUTION TOPICAL at 05:24

## 2019-04-02 RX ADMIN — LEVETIRACETAM 750 MILLIGRAM(S): 250 TABLET, FILM COATED ORAL at 05:25

## 2019-04-02 RX ADMIN — SODIUM CHLORIDE 3 MILLILITER(S): 9 INJECTION INTRAMUSCULAR; INTRAVENOUS; SUBCUTANEOUS at 05:39

## 2019-04-02 RX ADMIN — Medication 1000 MILLIGRAM(S): at 13:02

## 2019-04-02 RX ADMIN — Medication 400 MILLIGRAM(S): at 01:04

## 2019-04-02 RX ADMIN — ENOXAPARIN SODIUM 40 MILLIGRAM(S): 100 INJECTION SUBCUTANEOUS at 12:03

## 2019-04-02 RX ADMIN — AMLODIPINE BESYLATE 10 MILLIGRAM(S): 2.5 TABLET ORAL at 05:25

## 2019-04-02 RX ADMIN — Medication 1000 MILLIGRAM(S): at 01:13

## 2019-04-02 RX ADMIN — Medication 400 MILLIGRAM(S): at 06:46

## 2019-04-02 RX ADMIN — SODIUM CHLORIDE 3 MILLILITER(S): 9 INJECTION INTRAMUSCULAR; INTRAVENOUS; SUBCUTANEOUS at 14:33

## 2019-04-02 NOTE — DISCHARGE NOTE NURSING/CASE MANAGEMENT/SOCIAL WORK - NSDCPNINST_GEN_ALL_CORE
Keep right breast dressing clean, dry and intact. Continue to use jobst bra. No heavy lifting. Return to ER for pus drainage from surgical site, fever greater than 100.4, chills and fever. Keep right breast incision clean, dry and intact, do not pull out steri strips. Continue to use jobst bra. No heavy lifting. Return to ER for pus drainage from surgical site, fever greater than 100.4, chills and fever.

## 2019-04-02 NOTE — PROGRESS NOTE ADULT - ASSESSMENT
84yo F s/p Rt mastectomy, Rt SLNBx (4/1)    PLAN:  -  c/w reg diet  -  pain control  -  monitor drain output  -  OOB to ambulate    DISPO:  return to rehab today with drain care per rehab    D SURGERY  [id number]

## 2019-04-02 NOTE — DISCHARGE NOTE PROVIDER - INSTRUCTIONS
TF via G Tube: TF via G Tube: Jevity 1.5 at 60mL/hr until 900mL infused.  Flush PEG with 200mL free water q4hrs.

## 2019-04-02 NOTE — DISCHARGE NOTE NURSING/CASE MANAGEMENT/SOCIAL WORK - NSDCDPATPORTLINK_GEN_ALL_CORE
You can access the [facility] Patient Portal, offered by [facility], by registering with the following website: [email or web address]. [facility] [place]

## 2019-04-02 NOTE — PHYSICAL THERAPY INITIAL EVALUATION ADULT - ADDITIONAL COMMENTS
Pt. is non-ambulatory and unable to perform transfers. Requires total assist lift for bed<>chair transfers. Pt. states she has been at rehab facility for almost 2 years.     Pt. was left supine in bed, NAD, call bell within reach.

## 2019-04-02 NOTE — DISCHARGE NOTE PROVIDER - NSDCCPCAREPLAN_GEN_ALL_CORE_FT
PRINCIPAL DISCHARGE DIAGNOSIS  Diagnosis: Invasive ductal carcinoma of breast  Assessment and Plan of Treatment: PRINCIPAL DISCHARGE DIAGNOSIS  Diagnosis: Invasive ductal carcinoma of breast  Assessment and Plan of Treatment: WOUND CARE: Apply clean gauze and paper tape over drain site once a day.  Wear surgical bra. ILEANA drain care: empty and record output and bring record to outpatient follow up appointment.  Drains will be removed at your follow up appointment.  BATHING: Please do not submerge wound underwater. You may shower and/or sponge bathe. It is OK to wash drain wound site.  ACTIVITY: No heavy lifting or straining. Otherwise, you may return to your usual level of physical activty. If you are taking narcotic pain medication (such as Percocet) medications while taking pain medications a car, operate machinery or make important decisions.  DIET: TF via G tube.  NOTIFY YOUR SURGEON IF: You have any bleeding that does not stop, any pus draining from your wound(s), any fever (over 100.4 F) or chills, persistent nausea/vomiting, persistent diarrhea, or if your pain is not controlled on your discharge pain medications.  FOLLOW-UP: Please follow up with your primary care physician in one week regarding your hospitalization.  Please follow up with Dr. Hollis in one week.  Call to schedule an appointment.

## 2019-04-02 NOTE — DISCHARGE NOTE PROVIDER - CARE PROVIDER_API CALL
Dixon Waldron)  Surgery  [address], [place] [address]  Phone: [phone]  Fax: [phone]  Follow Up Time: 1 week

## 2019-04-02 NOTE — DISCHARGE NOTE NURSING/CASE MANAGEMENT/SOCIAL WORK - NSDCPNDISPN_GEN_ALL_CORE
Safe use, storage and disposal of opioids when prescribed/Education provided on the pain management plan of care/Side effects of pain management treatment/Activities of daily living, including home environment that might     exacerbate pain or reduce effectiveness of the pain management plan of care as well as strategies to address these issues

## 2019-04-02 NOTE — PHYSICAL THERAPY INITIAL EVALUATION ADULT - DISCHARGE DISPOSITION, PT EVAL
Return to rehabilitation facility. Pt. is not appropriate for skilled, therapeutic PT intervention at this time secondary to being at baseline level of function. Pt. will be discharged from PT program.

## 2019-04-02 NOTE — PROGRESS NOTE ADULT - SUBJECTIVE AND OBJECTIVE BOX
[facility] D SURGERY DAILY PROGRESS NOTE    SUBJECTIVE:  -  doing well, pain controlled    OBJECTIVE:    Vital Signs Last 24 Hrs  T(C): 36.4 (02 Apr 2019 05:19), Max: 37.2 (01 Apr 2019 21:03)  T(F): 97.6 (02 Apr 2019 05:19), Max: 99 (01 Apr 2019 21:03)  HR: 84 (02 Apr 2019 01:13) (83 - 101)  BP: 123/63 (02 Apr 2019 05:19) (101/84 - 136/51)  BP(mean): 69 (01 Apr 2019 13:30) (62 - 88)  RR: 18 (02 Apr 2019 05:19) (13 - 26)  SpO2: 99% (02 Apr 2019 05:19) (92% - 99%)    EXAM:  Gen: NAD, well-developed  Neuro: AAOX3, PERRL, CNII-XII grossly intact  Chest: right mastectomy site with pressure dressing c/d/i, right axilla w/ 2 JPs serosang  CV: Pulse regularly present  Pulm: normal respiratory effort  GI: abd soft, nontender, nondistended               Feeding tube in place and functioning  Ext: 2+ pulses throughout, LUE and LLE with significant contractures

## 2019-04-02 NOTE — DISCHARGE NOTE PROVIDER - HOSPITAL COURSE
83F PMH CVA, HLD, Gout, HTN, MI, presenting for electively scheduled R mastectomy with sentinel LN biopsy. Patient was admitted to [facility] in Oct 2018 for PNA. During her hospital stay she underwent a a CT angio (10/24), with incidental finding of  a 1.3cm indeterminate Right breast nodule. Patient was seen as an outpatient for follow up, and has since had biopsy which revealed invasive ductal carcinoma measuring at least 1.1cm.         Pt admitted to surgical oncology service and went to the OR with Dr. Hollis on 4/1/19 for a R breast mastectomy, R SLNBx (frozen-negative).  2 ILEANA drains left in surgical bed.  Pt tolerated procedure well, without complication.  Post op pt transferred from PACU to surgical floor.  TF were restarted as tolerated.  Pain control was transitioned from IV to PO meds via G tube.  Pt consulted and rec rehab.   Pt currently working with PT, voiding, tolerating TF, with pain well controlled on oral pain meds.  Per team and attending, pt is hemodynamically stable for discharge to rehab with ILEANA drains to follow up as an outpatient. 83F PMH CVA, HLD, Gout, HTN, MI, presenting for electively scheduled R mastectomy with sentinel LN biopsy. Patient was admitted to [facility] in Oct 2018 for PNA. During her hospital stay she underwent a a CT angio (10/24), with incidental finding of  a 1.3cm indeterminate Right breast nodule. Patient was seen as an outpatient for follow up, and has since had biopsy which revealed invasive ductal carcinoma measuring at least 1.1cm.         Pt admitted to surgical oncology service and went to the OR with Dr. Hollis on 4/1/19 for a R breast mastectomy, R SLNBx (frozen-negative).  2 ILEANA drains left in surgical bed.  Pt tolerated procedure well, without complication.  Post op pt transferred from PACU to surgical floor.  TF were restarted as tolerated.  Pain control was transitioned from IV to PO meds via G tube.  Pt consulted and rec rehab.   Pt currently working with PT, voiding, tolerating TF, with pain well controlled on oral pain meds.  Per team and attending, pt is hemodynamically stable for discharge to rehab with ILEANA drains to follow up as an outpatient. 83F [facility] CVA, HLD, Gout, HTN, MI, presenting for electively scheduled R mastectomy with sentinel LN biopsy. Patient was admitted to [facility] in Oct 2018 for PNA. During her hospital stay she underwent a a CT angio (10/24), with incidental finding of  a 1.3cm indeterminate Right breast nodule. Patient was seen as an outpatient for follow up, and has since had biopsy which revealed invasive ductal carcinoma measuring at least 1.1cm.         Pt admitted to surgical oncology service and went to the OR with Dr. Hollis on 4/1/19 for a R breast mastectomy, R SLNBx (frozen-negative).  2 ILEANA drains left in surgical bed.  Pt tolerated procedure well, without complication.  Post op pt transferred from PACU to surgical floor.  TF were restarted as tolerated.  Pain control was transitioned from IV to PO meds via G tube.  Pt consulted and rec rehab.   Pt currently working with PT, voiding, tolerating TF, with pain well controlled on oral pain meds.  Per team and attending, pt is hemodynamically stable for discharge to rehab with ILEANA drains to follow up as an outpatient.         ATTENDING STATEMENT:    - I have seen and examined the patient on rounds. Patient's chart, labs, images and reports reviewed.    Flaps viable    Drains serosanginous    pain controlled    -Patient evaluated to be stable for discharge. Patient educated with DC instructions, warning signs and symptoms . Pt also instructed to follow up with the surgeon in 1 week in office. Pt expressed verbal understanding.    -Patient instructed to follow up with me in my office 7-10 days after discharge. Follow up instructions provided.    Regards    Dixon Hollis MD    Division of Surgical Oncology    [address] [place] [id number]    Ph:[phone]

## 2019-04-02 NOTE — PHYSICAL THERAPY INITIAL EVALUATION ADULT - RANGE OF MOTION EXAMINATION, REHAB EVAL
except right shoulder 0-90 degrees flexion./Right UE ROM was WFL (within functional limits)/Right LE ROM was WFL (within functional limits)

## 2019-04-09 ENCOUNTER — APPOINTMENT (OUTPATIENT)
Dept: SURGICAL ONCOLOGY | Facility: CLINIC | Age: 84
End: 2019-04-09
Payer: MEDICARE

## 2019-04-09 VITALS
WEIGHT: 175 LBS | OXYGEN SATURATION: 92 % | HEIGHT: 64 IN | SYSTOLIC BLOOD PRESSURE: 139 MMHG | BODY MASS INDEX: 29.88 KG/M2 | HEART RATE: 92 BPM | DIASTOLIC BLOOD PRESSURE: 80 MMHG

## 2019-04-09 PROCEDURE — 99024 POSTOP FOLLOW-UP VISIT: CPT

## 2019-04-09 NOTE — REASON FOR VISIT
[Breast Cancer] : breast cancer [Post-Op] : a post-op for [FreeTextEntry2] : s/p Right total mastectomy and Right axillary sentinel lymph adenectomy on 4/1/19.

## 2019-04-09 NOTE — CONSULT LETTER
[Dear  ___] : Dear  [unfilled], [Consult Letter:] : I had the pleasure of evaluating your patient, [unfilled]. [( Thank you for referring [unfilled] for consultation for _____ )] : Thank you for referring [unfilled] for consultation for [unfilled] [Please see my note below.] : Please see my note below. [Sincerely,] : Sincerely, [FreeTextEntry2] : Melinda Brandon MD [FreeTextEntry3] : Dixon Hollis MD, FICS, FACS\par , Surgical Oncology\par [facility] [employer] [facility] [employer] [facility] [employer] [facility]\par [address]\[place], [place]- [address]\par \par [address] [facility]\[place], [place]- [address]\par \par (mob) [phone]\par (o) [phone]\par (f) [phone]\par

## 2019-04-09 NOTE — PHYSICAL EXAM
[Normal] : well developed, well nourished, in no acute distress [FreeTextEntry1] : RC present for exam.  [de-identified] : Right chest wall incision healing well without signs of infection, hematoma or seroma.  ILEANA drains x 2 discontinued.  Site benign.

## 2019-04-09 NOTE — ASSESSMENT
[FreeTextEntry1] : 83 year old bedboud female s/p stroke approximately 3 years ago noted with an indeterminate 1.5cm mass on CT angio in October 2018 while admitted to [facility]. Biopsy of Right breast revealed invasive ductal carcinoma\par (Er+/Pr+/Her2-)measuring at least 1.1cm.  Now s/p Right total mastectomy and Right axillary sentinel lymph adenectomy on 4/1/19.  Final pathology revealed a 1.5cm invasive duct carcinoma moderately differentiated with 4 negative LNs.  Tumor Stage: T1N0.  Healing well.  No post op fevers.\par \par Plan:\par RTO in 6 months for evaluation after Left mammo/sono.\par Referral to med/onc.\par Plan discussed with daughter who was present for exam.\par

## 2019-04-09 NOTE — HISTORY OF PRESENT ILLNESS
[de-identified] : Ms. Scott is a 83 year old bedbound female who presents today for a follow up visit. \par \par She was admitted to [facility] in Oct 2018 for PNA.  Underwent a a CT angio on 10/24/18 and was incidentally noted with a 1.3cm indeterminate Right breast nodule.  At the time I was consulted for the nodule and it was recommended that the patient undergo a biopsy which was not completed.  She is currently a resident at [facility].  On initial in office visit she denied  nipple discharge, skin changes, inversion or breast pain. Denies constitutional symptoms.\par \par 19:  She is s/p biopsy of Right breast mass 2:00 which revealed invasive moderately differentiated ductal carcinoma (Er+/Pr+/Her2-)measuring at least 1.1cm.  Biopsy of Left breast mass 8:00 benign.\par \par She is now s/p Right total mastectomy and Right axillary sentinel lymph adenectomy on 19.  Final pathology revealed a 1.5cm \par invasive duct carcinoma moderately differentiated with 4 negative LNs.  Tumor Stage: T1N0.  Today she is feeling generally well without complaint.  Denies fevers post op.  As per Rehab facility Drain # 2 with minimal serosanguinous drainage and drain #1 with 90cc drainage over the last 48 hours total.  \par \par PMH otherwise significant for stoke (3 years ago) HTN, hypothyroidism, asthma, gout, GERD and depression.\par Denies family history of breast or ovarian cancer.\par \par Menarche: unknown\par \par Age at first pregnancy:unknown\par \par Internist: Dr. Luly Chung [phone]

## 2019-04-26 NOTE — ED ADULT NURSE NOTE - NS ED NURSE IV DC DT
Monalisa THOMPSON Yamileth 56 y.o. female is here today for Blood Pressure check.   History of HTN yes.    Review of patient's allergies indicates:   Allergen Reactions    Keflex [cephalexin] Hives    Vicodin [hydrocodone-acetaminophen] Itching     itch     Creatinine   Date Value Ref Range Status   03/11/2019 0.8 0.5 - 1.4 mg/dL Final     Sodium   Date Value Ref Range Status   03/11/2019 138 136 - 145 mmol/L Final     Potassium   Date Value Ref Range Status   03/11/2019 3.8 3.5 - 5.1 mmol/L Final   ]  Patient verifies taking blood pressure medications on a regular basis at the same time of the day.     Current Outpatient Medications:     acetaminophen (TYLENOL) 500 MG tablet, Take 2 tablets (1,000 mg total) by mouth every 6 (six) hours as needed for Pain., Disp: 30 tablet, Rfl: 0    albuterol 90 mcg/actuation inhaler, Inhale 1-2 puffs into the lungs every 4 (four) hours as needed for Wheezing. Rescue, Disp: 1 Inhaler, Rfl: 2    amLODIPine (NORVASC) 10 MG tablet, Take 1 tablet (10 mg total) by mouth once daily., Disp: 90 tablet, Rfl: 0    aspirin 81 MG Chew, Take 1 tablet (81 mg total) by mouth once daily., Disp: , Rfl:     atorvastatin (LIPITOR) 40 MG tablet, Take 1 tablet (40 mg total) by mouth once daily., Disp: 90 tablet, Rfl: 3    benazepril (LOTENSIN) 20 MG tablet, TAKE 1 TABLET(20 MG) BY MOUTH EVERY DAY, Disp: 90 tablet, Rfl: 1    fluticasone (FLONASE) 50 mcg/actuation nasal spray, 1 spray (50 mcg total) by Each Nare route 2 (two) times daily., Disp: 1 Bottle, Rfl: 0    hydrOXYzine HCl (ATARAX) 25 MG tablet, Take 1 tablet (25 mg total) by mouth 3 (three) times daily as needed for Anxiety., Disp: 12 tablet, Rfl: 0    ibuprofen (ADVIL,MOTRIN) 600 MG tablet, Take 1 tablet (600 mg total) by mouth every 6 (six) hours as needed for Pain (Take with food as needed for mild-to-moderate pain)., Disp: 20 tablet, Rfl: 0    levETIRAcetam (KEPPRA) 1000 MG tablet, Take 1 tablet (1,000 mg total) by mouth 2 (two) times  daily., Disp: 60 tablet, Rfl: 5    lidocaine (LMX) 4 % cream, Apply topically as needed., Disp: , Rfl: 0    loratadine (CLARITIN) 10 mg tablet, Take 1 tablet (10 mg total) by mouth once daily., Disp: 60 tablet, Rfl: 0    metFORMIN (GLUCOPHAGE) 500 MG tablet, TAKE 1 TABLET BY MOUTH TWICE DAILY WITH MEALS, Disp: 180 tablet, Rfl: 1    nortriptyline (PAMELOR) 10 MG capsule, Take 1 capsule (10 mg total) by mouth every evening., Disp: 30 capsule, Rfl: 5    ondansetron (ZOFRAN-ODT) 8 MG TbDL, Take 1 tablet (8 mg total) by mouth every 12 (twelve) hours as needed., Disp: 20 tablet, Rfl: 1    zolpidem (AMBIEN) 10 mg Tab, TAKE 1 TABLET BY MOUTH EVERY NIGHT AT BEDTIME, Disp: 30 tablet, Rfl: 2  Does patient have record of home blood pressure readings no. Readings have been averaging not done.   Last dose of blood pressure medication was taken at this morning.  Patient is asymptomatic.   Complains of no complaints.    Vitals:    04/26/19 0959   BP: 138/84   BP Location: Right arm   Patient Position: Sitting   BP Method: Large (Manual)         Dr. See informed of nurse visit.      18-Mar-2019 21:29

## 2019-05-02 ENCOUNTER — OUTPATIENT (OUTPATIENT)
Dept: OUTPATIENT SERVICES | Facility: HOSPITAL | Age: 84
LOS: 1 days | Discharge: HOME | End: 2019-05-02

## 2019-05-02 DIAGNOSIS — C50.919 MALIGNANT NEOPLASM OF UNSPECIFIED SITE OF UNSPECIFIED FEMALE BREAST: ICD-10-CM

## 2019-05-06 ENCOUNTER — APPOINTMENT (OUTPATIENT)
Dept: HEMATOLOGY ONCOLOGY | Facility: CLINIC | Age: 84
End: 2019-05-06
Payer: MEDICARE

## 2019-05-06 VITALS
SYSTOLIC BLOOD PRESSURE: 136 MMHG | DIASTOLIC BLOOD PRESSURE: 78 MMHG | HEIGHT: 64 IN | WEIGHT: 175 LBS | TEMPERATURE: 98.2 F | BODY MASS INDEX: 29.88 KG/M2 | RESPIRATION RATE: 15 BRPM | HEART RATE: 60 BPM | OXYGEN SATURATION: 94 %

## 2019-05-06 DIAGNOSIS — C50.911 MALIGNANT NEOPLASM OF UNSPECIFIED SITE OF RIGHT FEMALE BREAST: ICD-10-CM

## 2019-05-06 DIAGNOSIS — G82.20 PARAPLEGIA, UNSPECIFIED: ICD-10-CM

## 2019-05-06 DIAGNOSIS — Z78.9 OTHER SPECIFIED HEALTH STATUS: ICD-10-CM

## 2019-05-06 PROCEDURE — 99205 OFFICE O/P NEW HI 60 MIN: CPT

## 2019-05-06 NOTE — REASON FOR VISIT
[Initial Consultation] : an initial consultation [Other: _____] : [unfilled] [Formal Caregiver] : formal caregiver [FreeTextEntry2] : right breast cancer ER/ME POSITIVE, HER-2 NEGATIVE

## 2019-05-06 NOTE — ASSESSMENT
[FreeTextEntry1] : In summary, Ms. FRANKI MEHTA is a 83 year old postmenopausal female with stage IA (T1c, N0) ER positive, ND positive, HER-2/isaiah negative invasive ductal carcinoma of the right breast. She is status post mastectomy. She has multiple medical issues including, CVA, left paraplegia, left side contractures. She is wheel chair bound and is at the long term care at [facility]. \par \par She came today with ambulette services and CNA. I tried to call her daughter and sons but no one picked the phone. I discussed the treatment for stage I breast cancer with the patient including the role of chemotherapy, radiation therapy and endocrine therapy. Given her age and comorbidities, she is not a candidate for oncotype DX or chemo. She doesn’t meet criteria for PMRT. \par \par We discussed anastrozole can be used for distant disease control although patient has multiple competing comorbidities. Patient reports she has lost her ability to swallow due to the stroke and is totally PEG feed dependent. I reviewed the accompanying medical chart and all her meds are either liquid or are crushed prior to administration via pEG. We reviewed that aromatase inhibitors cant be crushed or used via PEG tube. She is not a candidate for tamoxifen due to medical comorbidities and immobile status. Tamoxifen cant be crushed either. Therefore I cant prescribe endocrine therapy. I told her to continue left breast annual imaging with Dr Hollis. She will see me once a year. \par \par The patient had plenty of time to ask questions and all of her questions were answered to her satisfaction. I gave her my office phone number and encouraged her to call with any questions or additional information.\par

## 2019-05-06 NOTE — HISTORY OF PRESENT ILLNESS
[de-identified] : Ms. FRANKI MEHTA  is a 83 year old female here for an evaluation of breast cancer. Her oncologic history is as follows:\par \par She was admitted to [facility] in Oct 2018 for SOB. Underwent a a CT angio on 10/24/18  which showed no PE, multifocal PNA, Indeterminate right breast nodule, measuring 1.3 cm further evaluation recommended.  She underwent right breast 2:00 lesion core biopsy on 2/4/19 which showed invasive moderately-differentiated ductal carcinoma, Brooks score 6/9, measuring 1.1 cm, ER >90% POSITIVE, OR >90% POSITIVE, HER-2/isaiah NEGATIVE on CISH. Left breast 8:00 lesion core Bx performed the same day Showed no breast parenchymal tissue. Benign.\par \par She underwent right breast simple mastectomy on 4/1/19 which revealed Invasive ductal carcinoma, Brooks 6/9, grade 2, 1.5 cm in size .Margins were clean. Lymphovascular invasion was absent.  DCIS was noted. 4 (sentinel + non sentinel) lymph nodes were removed and were negative for metastasis. pT1cN0\par \par  She is currently a resident at [facility]. She had a stroke 2 years ago and has been at [facility] for 2 years. She is wheel chair bound. Pt is a poor historian. History obtained from sunrise, allscripts and consults forms\par

## 2019-05-06 NOTE — CONSULT LETTER
[Dear  ___] : Dear  [unfilled], [Consult Letter:] : I had the pleasure of evaluating your patient, [unfilled]. [Please see my note below.] : Please see my note below. [Consult Closing:] : Thank you very much for allowing me to participate in the care of this patient.  If you have any questions, please do not hesitate to contact me. [Sincerely,] : Sincerely, [FreeTextEntry3] : Delfina Carroll MD\par Division of Medical Oncology and Hematology\par [facility]\par [employer] [facility]\par [place], [place] [DrMarshall  ___] : Dr. NORRIS

## 2019-05-06 NOTE — PHYSICAL EXAM
[Completely disabled. Cannot carry on any self care. Totally confined to bed or chair] : Status 4- Completely disabled. Cannot carry on any self care. Totally confined to bed or chair [Normal] : affect appropriate [de-identified] : s/p right mastectomy, healed well.  [de-identified] : + PEG tube [de-identified] : left sided LE contracture 2/2 paraplegia. [de-identified] : left side paraplegic

## 2019-05-20 NOTE — DIETITIAN INITIAL EVALUATION ADULT. - PROBLEM/PLAN-1
"Pharmacy Chemotherapy Calculation:    Patient Name: Karlene Walters   Dx: Colon CA        Cycle 83 ([place] cycle 75)    Previous treatment: 05/09/19    Protocol: 5-FU + Leucovorin      IV over 2 hours on Day 1, followed by    IVP on Day 1, followed by                               -- 10/31/17: delete Leucovorin and 5-FU push d/t neutropenia per Dr. Hardy   Fluorouracil  continuous infusion over 46-48 hours                              -- 20% reduction (1920 mg/m²) to be continued starting C28 per C Alsop APRN   14 day cycles for 12 cycles (adjuvant) or until disease progression or unacceptable toxicity  NCCN Guidelines for Colon Cancer. V.2.2015.  Jay T, et al. Eur J Cancer.1999;35(9):1343-7.      Allergies: Codeine; Oxaliplatin; Pcn; Sulfa drugs; and Tape       /63   Pulse 96   Temp 36.4 °C (97.5 °F) (Temporal) Comment: transfered vitals from earlier this morning.  Resp 18   Ht 1.651 m (5' 5\") Comment: transfered weight from earlier this morninig.  Wt 76.8 kg (169 lb 5 oz) Comment: transfered height from earlier this morninig.  LMP  (LMP Unknown)   SpO2 98%   BMI 28.18 kg/m²  Body surface area is 1.88 meters squared.    Labs 05/21/19:  ANC ~ 3900  Plt = 241k Hgb = 12.7     Labs 05/07/19:  SCr = 1.04 mg/dL Cr Cl ~ 53 ml/ min LFTs = 23/17/149 TBili = 1.0     Fluorouracil (5-FU) 1920 mg/m² x 1.88 m² = 3610 mg              <10% difference, OK to treat with final dose = 3610 mg CIVI over 46 hrs at 1.6 ml/hr       Lalit Andrade, PharmD, BCOP    " DISPLAY PLAN FREE TEXT

## 2019-08-12 ENCOUNTER — APPOINTMENT (OUTPATIENT)
Dept: PHARMACY | Facility: CLINIC | Age: 84
End: 2019-08-12

## 2019-08-23 ENCOUNTER — FORM ENCOUNTER (OUTPATIENT)
Age: 84
End: 2019-08-23

## 2019-10-04 ENCOUNTER — RESULT REVIEW (OUTPATIENT)
Age: 84
End: 2019-10-04

## 2019-10-30 ENCOUNTER — FORM ENCOUNTER (OUTPATIENT)
Age: 84
End: 2019-10-30

## 2020-02-06 NOTE — ED ADULT NURSE REASSESSMENT NOTE - NS ED NURSE REASSESS COMMENT FT1
Called pt to discuss labs and f/u sx's- able to reach him this morning (after multiple previous calls).  He reports he's feeling much better.  Sx's of SOB with walking and weakness sx's are pretty much gone.    He does still have some fatigue.    No other thyroid-like sx's upon review on phone, but he did think his weight was up a bit at his appt.    The only real change he's done was continue on iron supplementation daily (started that after 1/7/20 appt w/ cardiology w/ low hgb and high TSH found).    Discussed labs...  TSH still very high at 48..  T4 still a bit low at 0.67.  TPO very high >1000.    Other labs- B12 on lower end of normal.    CBC with hgb at 11.7, which is low, though higher than last year (though that was around the time of his fem/pop bypass, hgb's ranged from 8-10s).  .  B12 on low end of normal.  Ferritin in 100s.  D-dimer 0.5 (on higher end of normal).    Plan-   Will have him continue the iron as that seems to have made him feel better.  Will need to look further into the reasons for potential JACQUELINE at his f/u appt, however.    For his hypothyroidism (Hashimoto's due to the high TPO), will start him on levothyroxine 50mcg/d, and have him rtc for lab f/u in 6 wks.  Okay for lab-only appt at that time if he is feeling well/fine.  If any sx's, make appt as well.  Do want to see him for 3 month f/u appt to recheck thyroid and anemia labs, and look into JACQUELINE causes.    Pt agrees with plan.      Risks and benefits of medication(s) including potential side effects reviewed with patient.  Questions answered.    patient sitting up in bed. Tolerating bipap. In no distress. Will continue to monitor. Admitted pending bed assignment.

## 2020-02-28 ENCOUNTER — APPOINTMENT (OUTPATIENT)
Dept: GASTROENTEROLOGY | Facility: CLINIC | Age: 85
End: 2020-02-28

## 2020-03-04 ENCOUNTER — FORM ENCOUNTER (OUTPATIENT)
Age: 85
End: 2020-03-04

## 2020-07-27 ENCOUNTER — EMERGENCY (EMERGENCY)
Facility: HOSPITAL | Age: 85
LOS: 1 days | Discharge: HOME | End: 2020-07-27
Attending: EMERGENCY MEDICINE
Payer: COMMERCIAL

## 2020-07-27 VITALS
DIASTOLIC BLOOD PRESSURE: 78 MMHG | HEART RATE: 65 BPM | TEMPERATURE: 98 F | RESPIRATION RATE: 18 BRPM | OXYGEN SATURATION: 93 % | SYSTOLIC BLOOD PRESSURE: 129 MMHG

## 2020-07-27 VITALS
SYSTOLIC BLOOD PRESSURE: 112 MMHG | HEART RATE: 86 BPM | OXYGEN SATURATION: 93 % | DIASTOLIC BLOOD PRESSURE: 73 MMHG | TEMPERATURE: 98 F | RESPIRATION RATE: 18 BRPM

## 2020-07-27 LAB
ALBUMIN SERPL ELPH-MCNC: 4 G/DL — SIGNIFICANT CHANGE UP (ref 3.3–5)
ALP SERPL-CCNC: 93 U/L — SIGNIFICANT CHANGE UP (ref 40–120)
ALT FLD-CCNC: 14 U/L — SIGNIFICANT CHANGE UP (ref 10–45)
ANION GAP SERPL CALC-SCNC: 17 MMOL/L — SIGNIFICANT CHANGE UP (ref 5–17)
AST SERPL-CCNC: 12 U/L — SIGNIFICANT CHANGE UP (ref 10–40)
BASOPHILS # BLD AUTO: 0.05 K/UL — SIGNIFICANT CHANGE UP (ref 0–0.2)
BASOPHILS NFR BLD AUTO: 0.6 % — SIGNIFICANT CHANGE UP (ref 0–2)
BILIRUB SERPL-MCNC: 0.3 MG/DL — SIGNIFICANT CHANGE UP (ref 0.2–1.2)
BUN SERPL-MCNC: 24 MG/DL — HIGH (ref 7–23)
CALCIUM SERPL-MCNC: 9.4 MG/DL — SIGNIFICANT CHANGE UP (ref 8.4–10.5)
CHLORIDE SERPL-SCNC: 99 MMOL/L — SIGNIFICANT CHANGE UP (ref 96–108)
CO2 SERPL-SCNC: 24 MMOL/L — SIGNIFICANT CHANGE UP (ref 22–31)
CREAT SERPL-MCNC: 0.71 MG/DL — SIGNIFICANT CHANGE UP (ref 0.5–1.3)
EOSINOPHIL # BLD AUTO: 0.33 K/UL — SIGNIFICANT CHANGE UP (ref 0–0.5)
EOSINOPHIL NFR BLD AUTO: 4 % — SIGNIFICANT CHANGE UP (ref 0–6)
GAS PNL BLDV: SIGNIFICANT CHANGE UP
GLUCOSE SERPL-MCNC: 100 MG/DL — HIGH (ref 70–99)
HCT VFR BLD CALC: 36.4 % — SIGNIFICANT CHANGE UP (ref 34.5–45)
HGB BLD-MCNC: 11.5 G/DL — SIGNIFICANT CHANGE UP (ref 11.5–15.5)
IMM GRANULOCYTES NFR BLD AUTO: 0.6 % — SIGNIFICANT CHANGE UP (ref 0–1.5)
LYMPHOCYTES # BLD AUTO: 1.88 K/UL — SIGNIFICANT CHANGE UP (ref 1–3.3)
LYMPHOCYTES # BLD AUTO: 22.8 % — SIGNIFICANT CHANGE UP (ref 13–44)
MCHC RBC-ENTMCNC: 31.5 PG — SIGNIFICANT CHANGE UP (ref 27–34)
MCHC RBC-ENTMCNC: 31.6 GM/DL — LOW (ref 32–36)
MCV RBC AUTO: 99.7 FL — SIGNIFICANT CHANGE UP (ref 80–100)
MONOCYTES # BLD AUTO: 0.55 K/UL — SIGNIFICANT CHANGE UP (ref 0–0.9)
MONOCYTES NFR BLD AUTO: 6.7 % — SIGNIFICANT CHANGE UP (ref 2–14)
NEUTROPHILS # BLD AUTO: 5.37 K/UL — SIGNIFICANT CHANGE UP (ref 1.8–7.4)
NEUTROPHILS NFR BLD AUTO: 65.3 % — SIGNIFICANT CHANGE UP (ref 43–77)
NRBC # BLD: 0 /100 WBCS — SIGNIFICANT CHANGE UP (ref 0–0)
PLATELET # BLD AUTO: 199 K/UL — SIGNIFICANT CHANGE UP (ref 150–400)
POTASSIUM SERPL-MCNC: 4.3 MMOL/L — SIGNIFICANT CHANGE UP (ref 3.5–5.3)
POTASSIUM SERPL-SCNC: 4.3 MMOL/L — SIGNIFICANT CHANGE UP (ref 3.5–5.3)
PROT SERPL-MCNC: 7 G/DL — SIGNIFICANT CHANGE UP (ref 6–8.3)
RBC # BLD: 3.65 M/UL — LOW (ref 3.8–5.2)
RBC # FLD: 15.1 % — HIGH (ref 10.3–14.5)
SODIUM SERPL-SCNC: 140 MMOL/L — SIGNIFICANT CHANGE UP (ref 135–145)
WBC # BLD: 8.23 K/UL — SIGNIFICANT CHANGE UP (ref 3.8–10.5)
WBC # FLD AUTO: 8.23 K/UL — SIGNIFICANT CHANGE UP (ref 3.8–10.5)

## 2020-07-27 PROCEDURE — 83880 ASSAY OF NATRIURETIC PEPTIDE: CPT

## 2020-07-27 PROCEDURE — L8699: CPT

## 2020-07-27 PROCEDURE — 85027 COMPLETE CBC AUTOMATED: CPT

## 2020-07-27 PROCEDURE — 94640 AIRWAY INHALATION TREATMENT: CPT

## 2020-07-27 PROCEDURE — 43762 RPLC GTUBE NO REVJ TRC: CPT

## 2020-07-27 PROCEDURE — 80053 COMPREHEN METABOLIC PANEL: CPT

## 2020-07-27 PROCEDURE — 49465 FLUORO EXAM OF G/COLON TUBE: CPT

## 2020-07-27 PROCEDURE — 82947 ASSAY GLUCOSE BLOOD QUANT: CPT

## 2020-07-27 PROCEDURE — 71045 X-RAY EXAM CHEST 1 VIEW: CPT | Mod: 26

## 2020-07-27 PROCEDURE — 84132 ASSAY OF SERUM POTASSIUM: CPT

## 2020-07-27 PROCEDURE — 84295 ASSAY OF SERUM SODIUM: CPT

## 2020-07-27 PROCEDURE — 82565 ASSAY OF CREATININE: CPT

## 2020-07-27 PROCEDURE — 82435 ASSAY OF BLOOD CHLORIDE: CPT

## 2020-07-27 PROCEDURE — 85014 HEMATOCRIT: CPT

## 2020-07-27 PROCEDURE — 99284 EMERGENCY DEPT VISIT MOD MDM: CPT | Mod: 25

## 2020-07-27 PROCEDURE — 83605 ASSAY OF LACTIC ACID: CPT

## 2020-07-27 PROCEDURE — 82803 BLOOD GASES ANY COMBINATION: CPT

## 2020-07-27 PROCEDURE — 71045 X-RAY EXAM CHEST 1 VIEW: CPT

## 2020-07-27 PROCEDURE — 82330 ASSAY OF CALCIUM: CPT

## 2020-07-27 RX ORDER — ALBUTEROL 90 UG/1
2 AEROSOL, METERED ORAL ONCE
Refills: 0 | Status: COMPLETED | OUTPATIENT
Start: 2020-07-27 | End: 2020-07-27

## 2020-07-27 RX ORDER — ALBUTEROL 90 UG/1
2.5 AEROSOL, METERED ORAL ONCE
Refills: 0 | Status: DISCONTINUED | OUTPATIENT
Start: 2020-07-27 | End: 2020-07-27

## 2020-07-27 RX ADMIN — ALBUTEROL 2 PUFF(S): 90 AEROSOL, METERED ORAL at 02:21

## 2020-07-27 NOTE — ED PROVIDER NOTE - NS ED ROS FT
GENERAL: denies fever, chills  HEENT: denies headaches  CARDIAC: denies chest pain, palpitations  PULM: denies SOB, cough  GI: denies abdominal pain, nausea, vomiting, diarrhea  : denies dysuria  NEURO: denies numbness, tingling  MSK: denies muscle pain  SKIN: denies new rashes  HEME: denies active bleeding

## 2020-07-27 NOTE — ED PROVIDER NOTE - NSFOLLOWUPINSTRUCTIONS_ED_ALL_ED_FT
G-tube dislodgment    - Please follow up with GI doctor    - A 12 Fr was placed     SEEK IMMEDIATE MEDICAL CARE IF YOU HAVE ANY OF THE FOLLOWING SYMPTOMS: worsening abdominal pain, uncontrollable vomiting, profuse diarrhea, inability to have bowel movements or pass gas, black or bloody stools, fever accompanying chest pain or back pain, or fainting.

## 2020-07-27 NOTE — ED PROVIDER NOTE - CHIEF COMPLAINT
The patient is a 85y Female complaining of The patient is a 85y Female complaining of G tube displacement

## 2020-07-27 NOTE — ED ADULT NURSE NOTE - NSIMPLEMENTINTERV_GEN_ALL_ED
Implemented All Fall Risk Interventions:  Candia to call system. Call bell, personal items and telephone within reach. Instruct patient to call for assistance. Room bathroom lighting operational. Non-slip footwear when patient is off stretcher. Physically safe environment: no spills, clutter or unnecessary equipment. Stretcher in lowest position, wheels locked, appropriate side rails in place. Provide visual cue, wrist band, yellow gown, etc. Monitor gait and stability. Monitor for mental status changes and reorient to person, place, and time. Review medications for side effects contributing to fall risk. Reinforce activity limits and safety measures with patient and family.

## 2020-07-27 NOTE — ED PROVIDER NOTE - CLINICAL SUMMARY MEDICAL DECISION MAKING FREE TEXT BOX
Attending MD Nuno: 85F with ho CVA, chronic G tube presenting from [facility] rehab facility for displaced G tube, patient unsure when G tube came out, limited historian. Also intermittently wheezing on exam. G tube replaced at bedside 12F, will confirm placement with XR. Screening CXR, screening labs for wheezing. Will obtain collateral from [facility] facility as to whether patient has reactive airways disease, r/o pulm edema on CXR

## 2020-07-27 NOTE — ED PROVIDER NOTE - NSFOLLOWUPCLINICS_GEN_ALL_ED_FT
[facility]  Gastroenterology  [address] [place] [id number]  Phone: [phone]  Fax:   Follow Up Time: Routine

## 2020-07-27 NOTE — ED PROVIDER NOTE - ATTENDING CONTRIBUTION TO CARE
Attending MD Nuno:  I personally have seen and examined this patient.  Resident note reviewed and agree on plan of care and except where noted.  See HPI, PE, and MDM for details.

## 2020-07-27 NOTE — ED PROVIDER NOTE - PATIENT PORTAL LINK FT
You can access the FollowMyHealth Patient Portal offered by [facility] by registering at the following website: [email or web address]. By joining [facility]’s FollowMyHealth portal, you will also be able to view your health information using other applications (apps) compatible with our system.

## 2020-07-27 NOTE — ED PROVIDER NOTE - PHYSICAL EXAMINATION
GENERAL: bedbound elderly female well appearing in no acute distress, non-toxic appearing  HEAD: normocephalic, atraumatic  HEENT: normal conjunctiva, oral mucosa moist, uvula midline  CARDIAC: regular rate and rhythm, normal S1S2, no appreciable murmurs  PULM: audible wheezing b/l  GI: obese abdomen nondistended, soft, nontender, no guarding or rebound tenderness  NEURO: LLE hip in flexion  MSK: no calf tenderness b/l  SKIN: well-perfused, extremities warm  PSYCH: appropriate mood and affect

## 2020-07-27 NOTE — ED PROVIDER NOTE - OBJECTIVE STATEMENT
84y/o F w/ h/o CVA c/b L sided weakness and bedbound due to LLE immobility, PEG for dysphagia, HTN, COPD sent from NH for G tube displacement. Pt states the tube was displaced accidently and was leaking out on her. Denies fevers, chills, nausea, vomiting, headache, chest pain, cough, sob, abdominal pain, diarrhea, back pain, dysuria.

## 2020-07-27 NOTE — ED PROVIDER NOTE - PROGRESS NOTE DETAILS
[place], PGY2: [facility] ([phone]) states pt has history of COPD and always has audible wheezing, take Symbicort twice a day, not due for dose. pt is in normal state of health and is only here for G tube displacement. [place], PGY2: Patient re-evaluated at bedside and is now currently feeling better after treatment. VSS. Time was taken to answer all of patients questions and concerns. Return precaution instructions were given and patient understands and feels comfortable with disposition.

## 2020-07-27 NOTE — ED ADULT NURSE NOTE - OBJECTIVE STATEMENT
85 year old female with a PMH of stroke with left sided deficits(left knee and left arm contracted), MI, HTN and gout comes to the ED via EMS from [facility] for "g tube displacement." Per EMS, Rehab facility and patient are unaware what time g-tube had fallen out. Upon assessment, g-tube insertion site is clean and dry with no bleeding/discharge noted. Insertion site appears closed off and not as patent. Patient is a/ox3, VSS, speaking coherently, follows commands and in NAD at this time. B/l audible wheezes can be heard, per patient, this is her baseline and intermittently appears. Abdomen is round, nontender and nondistended. Peripheral pulses are strong and equal b/l with no edema noted. Skin is warm, dry and intact. Patient denies CP/SOB, f/c, n/v/d, dizziness/lightheadedness, numbness/tingling/weakness, abdominal pain/back pain, hematuria/dysuria/frequency at this time.

## 2020-11-16 ENCOUNTER — RESULT REVIEW (OUTPATIENT)
Age: 85
End: 2020-11-16

## 2020-11-18 ENCOUNTER — RESULT REVIEW (OUTPATIENT)
Age: 85
End: 2020-11-18

## 2020-11-19 ENCOUNTER — RESULT REVIEW (OUTPATIENT)
Age: 85
End: 2020-11-19

## 2020-12-19 ENCOUNTER — RESULT REVIEW (OUTPATIENT)
Age: 85
End: 2020-12-19

## 2021-01-01 ENCOUNTER — RESULT REVIEW (OUTPATIENT)
Age: 86
End: 2021-01-01

## 2021-01-01 ENCOUNTER — TRANSCRIPTION ENCOUNTER (OUTPATIENT)
Age: 86
End: 2021-01-01

## 2021-01-01 ENCOUNTER — INPATIENT (INPATIENT)
Facility: HOSPITAL | Age: 86
LOS: 22 days | Discharge: NURSING, REHAB OR LONG-TERM CARE | DRG: 871 | End: 2022-01-11
Attending: INTERNAL MEDICINE | Admitting: HOSPITALIST
Payer: COMMERCIAL

## 2021-01-01 VITALS
SYSTOLIC BLOOD PRESSURE: 119 MMHG | HEIGHT: 64 IN | TEMPERATURE: 98 F | OXYGEN SATURATION: 94 % | RESPIRATION RATE: 20 BRPM | WEIGHT: 241.41 LBS | DIASTOLIC BLOOD PRESSURE: 76 MMHG | HEART RATE: 709 BPM

## 2021-01-01 DIAGNOSIS — Z29.9 ENCOUNTER FOR PROPHYLACTIC MEASURES, UNSPECIFIED: ICD-10-CM

## 2021-01-01 DIAGNOSIS — K35.32 ACUTE APPENDICITIS WITH PERFORATION, LOCALIZED PERITONITIS, AND GANGRENE, WITHOUT ABSCESS: ICD-10-CM

## 2021-01-01 DIAGNOSIS — K86.9 DISEASE OF PANCREAS, UNSPECIFIED: ICD-10-CM

## 2021-01-01 DIAGNOSIS — R13.10 DYSPHAGIA, UNSPECIFIED: ICD-10-CM

## 2021-01-01 DIAGNOSIS — A41.9 SEPSIS, UNSPECIFIED ORGANISM: ICD-10-CM

## 2021-01-01 DIAGNOSIS — R50.9 FEVER, UNSPECIFIED: ICD-10-CM

## 2021-01-01 DIAGNOSIS — N85.00 ENDOMETRIAL HYPERPLASIA, UNSPECIFIED: ICD-10-CM

## 2021-01-01 DIAGNOSIS — J44.1 CHRONIC OBSTRUCTIVE PULMONARY DISEASE WITH (ACUTE) EXACERBATION: ICD-10-CM

## 2021-01-01 DIAGNOSIS — I38 ENDOCARDITIS, VALVE UNSPECIFIED: ICD-10-CM

## 2021-01-01 DIAGNOSIS — I63.9 CEREBRAL INFARCTION, UNSPECIFIED: ICD-10-CM

## 2021-01-01 LAB
-  AMIKACIN: SIGNIFICANT CHANGE UP
-  AMIKACIN: SIGNIFICANT CHANGE UP
-  AMOXICILLIN/CLAVULANIC ACID: SIGNIFICANT CHANGE UP
-  AMOXICILLIN/CLAVULANIC ACID: SIGNIFICANT CHANGE UP
-  AMPICILLIN/SULBACTAM: SIGNIFICANT CHANGE UP
-  AMPICILLIN: SIGNIFICANT CHANGE UP
-  AZTREONAM: SIGNIFICANT CHANGE UP
-  AZTREONAM: SIGNIFICANT CHANGE UP
-  CEFAZOLIN: SIGNIFICANT CHANGE UP
-  CEFEPIME: SIGNIFICANT CHANGE UP
-  CEFEPIME: SIGNIFICANT CHANGE UP
-  CEFOXITIN: SIGNIFICANT CHANGE UP
-  CEFOXITIN: SIGNIFICANT CHANGE UP
-  CEFTRIAXONE: SIGNIFICANT CHANGE UP
-  CEFTRIAXONE: SIGNIFICANT CHANGE UP
-  CIPROFLOXACIN: SIGNIFICANT CHANGE UP
-  CIPROFLOXACIN: SIGNIFICANT CHANGE UP
-  CLINDAMYCIN: SIGNIFICANT CHANGE UP
-  CORYNEBACTERIUM SPECIES: SIGNIFICANT CHANGE UP
-  ERTAPENEM: SIGNIFICANT CHANGE UP
-  ERTAPENEM: SIGNIFICANT CHANGE UP
-  ERYTHROMYCIN: SIGNIFICANT CHANGE UP
-  GENTAMICIN: SIGNIFICANT CHANGE UP
-  IMIPENEM: SIGNIFICANT CHANGE UP
-  IMIPENEM: SIGNIFICANT CHANGE UP
-  LEVOFLOXACIN: SIGNIFICANT CHANGE UP
-  LEVOFLOXACIN: SIGNIFICANT CHANGE UP
-  MEROPENEM: SIGNIFICANT CHANGE UP
-  MEROPENEM: SIGNIFICANT CHANGE UP
-  OXACILLIN: SIGNIFICANT CHANGE UP
-  PENICILLIN: SIGNIFICANT CHANGE UP
-  PIPERACILLIN/TAZOBACTAM: SIGNIFICANT CHANGE UP
-  PIPERACILLIN/TAZOBACTAM: SIGNIFICANT CHANGE UP
-  RIFAMPIN: SIGNIFICANT CHANGE UP
-  STAPHYLOCOCCUS EPIDERMIDIS, METHICILLIN RESISTANT: SIGNIFICANT CHANGE UP
-  TETRACYCLINE: SIGNIFICANT CHANGE UP
-  TETRACYCLINE: SIGNIFICANT CHANGE UP
-  TOBRAMYCIN: SIGNIFICANT CHANGE UP
-  TOBRAMYCIN: SIGNIFICANT CHANGE UP
-  TRIMETHOPRIM/SULFAMETHOXAZOLE: SIGNIFICANT CHANGE UP
-  VANCOMYCIN: SIGNIFICANT CHANGE UP
-  VANCOMYCIN: SIGNIFICANT CHANGE UP
A1C WITH ESTIMATED AVERAGE GLUCOSE RESULT: 6.3 % — HIGH (ref 4–5.6)
ALBUMIN SERPL ELPH-MCNC: 2.7 G/DL — LOW (ref 3.3–5)
ALBUMIN SERPL ELPH-MCNC: 3.1 G/DL — LOW (ref 3.3–5)
ALBUMIN SERPL ELPH-MCNC: 3.3 G/DL — SIGNIFICANT CHANGE UP (ref 3.3–5)
ALBUMIN SERPL ELPH-MCNC: 3.4 G/DL — SIGNIFICANT CHANGE UP (ref 3.3–5)
ALBUMIN SERPL ELPH-MCNC: 3.6 G/DL — SIGNIFICANT CHANGE UP (ref 3.3–5)
ALBUMIN SERPL ELPH-MCNC: 3.7 G/DL — SIGNIFICANT CHANGE UP (ref 3.3–5)
ALBUMIN SERPL ELPH-MCNC: 3.7 G/DL — SIGNIFICANT CHANGE UP (ref 3.3–5)
ALP SERPL-CCNC: 106 U/L — SIGNIFICANT CHANGE UP (ref 40–120)
ALP SERPL-CCNC: 108 U/L — SIGNIFICANT CHANGE UP (ref 40–120)
ALP SERPL-CCNC: 79 U/L — SIGNIFICANT CHANGE UP (ref 40–120)
ALP SERPL-CCNC: 81 U/L — SIGNIFICANT CHANGE UP (ref 40–120)
ALP SERPL-CCNC: 85 U/L — SIGNIFICANT CHANGE UP (ref 40–120)
ALP SERPL-CCNC: 90 U/L — SIGNIFICANT CHANGE UP (ref 40–120)
ALP SERPL-CCNC: 91 U/L — SIGNIFICANT CHANGE UP (ref 40–120)
ALP SERPL-CCNC: 94 U/L — SIGNIFICANT CHANGE UP (ref 40–120)
ALP SERPL-CCNC: 98 U/L — SIGNIFICANT CHANGE UP (ref 40–120)
ALP SERPL-CCNC: 99 U/L — SIGNIFICANT CHANGE UP (ref 40–120)
ALT FLD-CCNC: 12 U/L — SIGNIFICANT CHANGE UP (ref 10–45)
ALT FLD-CCNC: 13 U/L — SIGNIFICANT CHANGE UP (ref 10–45)
ALT FLD-CCNC: 13 U/L — SIGNIFICANT CHANGE UP (ref 10–45)
ALT FLD-CCNC: 15 U/L — SIGNIFICANT CHANGE UP (ref 10–45)
ALT FLD-CCNC: 16 U/L — SIGNIFICANT CHANGE UP (ref 10–45)
ALT FLD-CCNC: 16 U/L — SIGNIFICANT CHANGE UP (ref 10–45)
ALT FLD-CCNC: 51 U/L — HIGH (ref 10–45)
ALT FLD-CCNC: 74 U/L — HIGH (ref 10–45)
ANION GAP SERPL CALC-SCNC: 11 MMOL/L — SIGNIFICANT CHANGE UP (ref 5–17)
ANION GAP SERPL CALC-SCNC: 12 MMOL/L — SIGNIFICANT CHANGE UP (ref 5–17)
ANION GAP SERPL CALC-SCNC: 13 MMOL/L — SIGNIFICANT CHANGE UP (ref 5–17)
ANION GAP SERPL CALC-SCNC: 14 MMOL/L — SIGNIFICANT CHANGE UP (ref 5–17)
ANION GAP SERPL CALC-SCNC: 15 MMOL/L — SIGNIFICANT CHANGE UP (ref 5–17)
ANION GAP SERPL CALC-SCNC: 15 MMOL/L — SIGNIFICANT CHANGE UP (ref 5–17)
ANION GAP SERPL CALC-SCNC: 16 MMOL/L — SIGNIFICANT CHANGE UP (ref 5–17)
ANION GAP SERPL CALC-SCNC: 16 MMOL/L — SIGNIFICANT CHANGE UP (ref 5–17)
ANION GAP SERPL CALC-SCNC: 17 MMOL/L — SIGNIFICANT CHANGE UP (ref 5–17)
ANION GAP SERPL CALC-SCNC: 17 MMOL/L — SIGNIFICANT CHANGE UP (ref 5–17)
APPEARANCE UR: CLEAR — SIGNIFICANT CHANGE UP
APTT BLD: 26.7 SEC — LOW (ref 27.5–35.5)
APTT BLD: 27.6 SEC — SIGNIFICANT CHANGE UP (ref 27.5–35.5)
AST SERPL-CCNC: 10 U/L — SIGNIFICANT CHANGE UP (ref 10–40)
AST SERPL-CCNC: 11 U/L — SIGNIFICANT CHANGE UP (ref 10–40)
AST SERPL-CCNC: 11 U/L — SIGNIFICANT CHANGE UP (ref 10–40)
AST SERPL-CCNC: 14 U/L — SIGNIFICANT CHANGE UP (ref 10–40)
AST SERPL-CCNC: 27 U/L — SIGNIFICANT CHANGE UP (ref 10–40)
AST SERPL-CCNC: 79 U/L — HIGH (ref 10–40)
AST SERPL-CCNC: 8 U/L — LOW (ref 10–40)
AST SERPL-CCNC: 8 U/L — LOW (ref 10–40)
BACTERIA # UR AUTO: NEGATIVE — SIGNIFICANT CHANGE UP
BASE EXCESS BLDV CALC-SCNC: -0.4 MMOL/L — SIGNIFICANT CHANGE UP (ref -2–2)
BASE EXCESS BLDV CALC-SCNC: 1.2 MMOL/L — SIGNIFICANT CHANGE UP (ref -2–2)
BASE EXCESS BLDV CALC-SCNC: 3.8 MMOL/L — HIGH (ref -2–2)
BASOPHILS # BLD AUTO: 0 K/UL — SIGNIFICANT CHANGE UP (ref 0–0.2)
BASOPHILS # BLD AUTO: 0.01 K/UL — SIGNIFICANT CHANGE UP (ref 0–0.2)
BASOPHILS # BLD AUTO: 0.02 K/UL — SIGNIFICANT CHANGE UP (ref 0–0.2)
BASOPHILS # BLD AUTO: 0.03 K/UL — SIGNIFICANT CHANGE UP (ref 0–0.2)
BASOPHILS # BLD AUTO: 0.03 K/UL — SIGNIFICANT CHANGE UP (ref 0–0.2)
BASOPHILS # BLD AUTO: 0.04 K/UL — SIGNIFICANT CHANGE UP (ref 0–0.2)
BASOPHILS # BLD AUTO: 0.04 K/UL — SIGNIFICANT CHANGE UP (ref 0–0.2)
BASOPHILS NFR BLD AUTO: 0 % — SIGNIFICANT CHANGE UP (ref 0–2)
BASOPHILS NFR BLD AUTO: 0.1 % — SIGNIFICANT CHANGE UP (ref 0–2)
BASOPHILS NFR BLD AUTO: 0.1 % — SIGNIFICANT CHANGE UP (ref 0–2)
BASOPHILS NFR BLD AUTO: 0.2 % — SIGNIFICANT CHANGE UP (ref 0–2)
BASOPHILS NFR BLD AUTO: 0.3 % — SIGNIFICANT CHANGE UP (ref 0–2)
BASOPHILS NFR BLD AUTO: 0.3 % — SIGNIFICANT CHANGE UP (ref 0–2)
BASOPHILS NFR BLD AUTO: 0.5 % — SIGNIFICANT CHANGE UP (ref 0–2)
BILIRUB DIRECT SERPL-MCNC: <0.1 MG/DL — SIGNIFICANT CHANGE UP (ref 0–0.3)
BILIRUB INDIRECT FLD-MCNC: >0.2 MG/DL — SIGNIFICANT CHANGE UP (ref 0.2–1)
BILIRUB SERPL-MCNC: 0.2 MG/DL — SIGNIFICANT CHANGE UP (ref 0.2–1.2)
BILIRUB SERPL-MCNC: 0.3 MG/DL — SIGNIFICANT CHANGE UP (ref 0.2–1.2)
BILIRUB SERPL-MCNC: 0.4 MG/DL — SIGNIFICANT CHANGE UP (ref 0.2–1.2)
BILIRUB UR-MCNC: NEGATIVE — SIGNIFICANT CHANGE UP
BLD GP AB SCN SERPL QL: NEGATIVE — SIGNIFICANT CHANGE UP
BUN SERPL-MCNC: 13 MG/DL — SIGNIFICANT CHANGE UP (ref 7–23)
BUN SERPL-MCNC: 16 MG/DL — SIGNIFICANT CHANGE UP (ref 7–23)
BUN SERPL-MCNC: 18 MG/DL — SIGNIFICANT CHANGE UP (ref 7–23)
BUN SERPL-MCNC: 19 MG/DL — SIGNIFICANT CHANGE UP (ref 7–23)
BUN SERPL-MCNC: 21 MG/DL — SIGNIFICANT CHANGE UP (ref 7–23)
BUN SERPL-MCNC: 22 MG/DL — SIGNIFICANT CHANGE UP (ref 7–23)
BUN SERPL-MCNC: 29 MG/DL — HIGH (ref 7–23)
BUN SERPL-MCNC: 30 MG/DL — HIGH (ref 7–23)
BUN SERPL-MCNC: 37 MG/DL — HIGH (ref 7–23)
CA-I SERPL-SCNC: 1.14 MMOL/L — LOW (ref 1.15–1.33)
CA-I SERPL-SCNC: 1.16 MMOL/L — SIGNIFICANT CHANGE UP (ref 1.15–1.33)
CA-I SERPL-SCNC: 1.22 MMOL/L — SIGNIFICANT CHANGE UP (ref 1.15–1.33)
CALCIUM SERPL-MCNC: 8.3 MG/DL — LOW (ref 8.4–10.5)
CALCIUM SERPL-MCNC: 8.8 MG/DL — SIGNIFICANT CHANGE UP (ref 8.4–10.5)
CALCIUM SERPL-MCNC: 8.8 MG/DL — SIGNIFICANT CHANGE UP (ref 8.4–10.5)
CALCIUM SERPL-MCNC: 8.9 MG/DL — SIGNIFICANT CHANGE UP (ref 8.4–10.5)
CALCIUM SERPL-MCNC: 8.9 MG/DL — SIGNIFICANT CHANGE UP (ref 8.4–10.5)
CALCIUM SERPL-MCNC: 9.2 MG/DL — SIGNIFICANT CHANGE UP (ref 8.4–10.5)
CALCIUM SERPL-MCNC: 9.2 MG/DL — SIGNIFICANT CHANGE UP (ref 8.4–10.5)
CALCIUM SERPL-MCNC: 9.3 MG/DL — SIGNIFICANT CHANGE UP (ref 8.4–10.5)
CALCIUM SERPL-MCNC: 9.4 MG/DL — SIGNIFICANT CHANGE UP (ref 8.4–10.5)
CALCIUM SERPL-MCNC: 9.5 MG/DL — SIGNIFICANT CHANGE UP (ref 8.4–10.5)
CHLORIDE BLDV-SCNC: 104 MMOL/L — SIGNIFICANT CHANGE UP (ref 96–108)
CHLORIDE BLDV-SCNC: 98 MMOL/L — SIGNIFICANT CHANGE UP (ref 96–108)
CHLORIDE BLDV-SCNC: 99 MMOL/L — SIGNIFICANT CHANGE UP (ref 96–108)
CHLORIDE SERPL-SCNC: 100 MMOL/L — SIGNIFICANT CHANGE UP (ref 96–108)
CHLORIDE SERPL-SCNC: 101 MMOL/L — SIGNIFICANT CHANGE UP (ref 96–108)
CHLORIDE SERPL-SCNC: 101 MMOL/L — SIGNIFICANT CHANGE UP (ref 96–108)
CHLORIDE SERPL-SCNC: 104 MMOL/L — SIGNIFICANT CHANGE UP (ref 96–108)
CHLORIDE SERPL-SCNC: 104 MMOL/L — SIGNIFICANT CHANGE UP (ref 96–108)
CHLORIDE SERPL-SCNC: 95 MMOL/L — LOW (ref 96–108)
CHLORIDE SERPL-SCNC: 95 MMOL/L — LOW (ref 96–108)
CHLORIDE SERPL-SCNC: 98 MMOL/L — SIGNIFICANT CHANGE UP (ref 96–108)
CO2 BLDV-SCNC: 26 MMOL/L — SIGNIFICANT CHANGE UP (ref 22–26)
CO2 BLDV-SCNC: 29 MMOL/L — HIGH (ref 22–26)
CO2 BLDV-SCNC: 32 MMOL/L — HIGH (ref 22–26)
CO2 SERPL-SCNC: 20 MMOL/L — LOW (ref 22–31)
CO2 SERPL-SCNC: 21 MMOL/L — LOW (ref 22–31)
CO2 SERPL-SCNC: 22 MMOL/L — SIGNIFICANT CHANGE UP (ref 22–31)
CO2 SERPL-SCNC: 22 MMOL/L — SIGNIFICANT CHANGE UP (ref 22–31)
CO2 SERPL-SCNC: 23 MMOL/L — SIGNIFICANT CHANGE UP (ref 22–31)
CO2 SERPL-SCNC: 24 MMOL/L — SIGNIFICANT CHANGE UP (ref 22–31)
CO2 SERPL-SCNC: 25 MMOL/L — SIGNIFICANT CHANGE UP (ref 22–31)
COLOR SPEC: YELLOW — SIGNIFICANT CHANGE UP
CREAT SERPL-MCNC: 0.56 MG/DL — SIGNIFICANT CHANGE UP (ref 0.5–1.3)
CREAT SERPL-MCNC: 0.61 MG/DL — SIGNIFICANT CHANGE UP (ref 0.5–1.3)
CREAT SERPL-MCNC: 0.64 MG/DL — SIGNIFICANT CHANGE UP (ref 0.5–1.3)
CREAT SERPL-MCNC: 0.65 MG/DL — SIGNIFICANT CHANGE UP (ref 0.5–1.3)
CREAT SERPL-MCNC: 0.65 MG/DL — SIGNIFICANT CHANGE UP (ref 0.5–1.3)
CREAT SERPL-MCNC: 0.66 MG/DL — SIGNIFICANT CHANGE UP (ref 0.5–1.3)
CREAT SERPL-MCNC: 0.66 MG/DL — SIGNIFICANT CHANGE UP (ref 0.5–1.3)
CREAT SERPL-MCNC: 0.69 MG/DL — SIGNIFICANT CHANGE UP (ref 0.5–1.3)
CREAT SERPL-MCNC: 0.71 MG/DL — SIGNIFICANT CHANGE UP (ref 0.5–1.3)
CREAT SERPL-MCNC: 0.76 MG/DL — SIGNIFICANT CHANGE UP (ref 0.5–1.3)
CREAT SERPL-MCNC: 0.76 MG/DL — SIGNIFICANT CHANGE UP (ref 0.5–1.3)
CREAT SERPL-MCNC: 0.99 MG/DL — SIGNIFICANT CHANGE UP (ref 0.5–1.3)
CULTURE RESULTS: SIGNIFICANT CHANGE UP
DIFF PNL FLD: NEGATIVE — SIGNIFICANT CHANGE UP
EOSINOPHIL # BLD AUTO: 0 K/UL — SIGNIFICANT CHANGE UP (ref 0–0.5)
EOSINOPHIL # BLD AUTO: 0.02 K/UL — SIGNIFICANT CHANGE UP (ref 0–0.5)
EOSINOPHIL # BLD AUTO: 0.16 K/UL — SIGNIFICANT CHANGE UP (ref 0–0.5)
EOSINOPHIL # BLD AUTO: 0.16 K/UL — SIGNIFICANT CHANGE UP (ref 0–0.5)
EOSINOPHIL # BLD AUTO: 0.28 K/UL — SIGNIFICANT CHANGE UP (ref 0–0.5)
EOSINOPHIL # BLD AUTO: 0.29 K/UL — SIGNIFICANT CHANGE UP (ref 0–0.5)
EOSINOPHIL # BLD AUTO: 0.29 K/UL — SIGNIFICANT CHANGE UP (ref 0–0.5)
EOSINOPHIL NFR BLD AUTO: 0 % — SIGNIFICANT CHANGE UP (ref 0–6)
EOSINOPHIL NFR BLD AUTO: 0.2 % — SIGNIFICANT CHANGE UP (ref 0–6)
EOSINOPHIL NFR BLD AUTO: 0.9 % — SIGNIFICANT CHANGE UP (ref 0–6)
EOSINOPHIL NFR BLD AUTO: 1.1 % — SIGNIFICANT CHANGE UP (ref 0–6)
EOSINOPHIL NFR BLD AUTO: 2.3 % — SIGNIFICANT CHANGE UP (ref 0–6)
EOSINOPHIL NFR BLD AUTO: 2.7 % — SIGNIFICANT CHANGE UP (ref 0–6)
EOSINOPHIL NFR BLD AUTO: 3.3 % — SIGNIFICANT CHANGE UP (ref 0–6)
EPI CELLS # UR: 4 /HPF — SIGNIFICANT CHANGE UP
ESTIMATED AVERAGE GLUCOSE: 134 MG/DL — HIGH (ref 68–114)
GAS PNL BLDV: 131 MMOL/L — LOW (ref 136–145)
GAS PNL BLDV: 132 MMOL/L — LOW (ref 136–145)
GAS PNL BLDV: 139 MMOL/L — SIGNIFICANT CHANGE UP (ref 136–145)
GAS PNL BLDV: SIGNIFICANT CHANGE UP
GIANT PLATELETS BLD QL SMEAR: PRESENT — SIGNIFICANT CHANGE UP
GLUCOSE BLDC GLUCOMTR-MCNC: 118 MG/DL — HIGH (ref 70–99)
GLUCOSE BLDC GLUCOMTR-MCNC: 131 MG/DL — HIGH (ref 70–99)
GLUCOSE BLDC GLUCOMTR-MCNC: 140 MG/DL — HIGH (ref 70–99)
GLUCOSE BLDC GLUCOMTR-MCNC: 140 MG/DL — HIGH (ref 70–99)
GLUCOSE BLDC GLUCOMTR-MCNC: 142 MG/DL — HIGH (ref 70–99)
GLUCOSE BLDC GLUCOMTR-MCNC: 148 MG/DL — HIGH (ref 70–99)
GLUCOSE BLDC GLUCOMTR-MCNC: 150 MG/DL — HIGH (ref 70–99)
GLUCOSE BLDC GLUCOMTR-MCNC: 153 MG/DL — HIGH (ref 70–99)
GLUCOSE BLDC GLUCOMTR-MCNC: 154 MG/DL — HIGH (ref 70–99)
GLUCOSE BLDC GLUCOMTR-MCNC: 157 MG/DL — HIGH (ref 70–99)
GLUCOSE BLDC GLUCOMTR-MCNC: 157 MG/DL — HIGH (ref 70–99)
GLUCOSE BLDC GLUCOMTR-MCNC: 159 MG/DL — HIGH (ref 70–99)
GLUCOSE BLDC GLUCOMTR-MCNC: 161 MG/DL — HIGH (ref 70–99)
GLUCOSE BLDC GLUCOMTR-MCNC: 162 MG/DL — HIGH (ref 70–99)
GLUCOSE BLDC GLUCOMTR-MCNC: 165 MG/DL — HIGH (ref 70–99)
GLUCOSE BLDC GLUCOMTR-MCNC: 165 MG/DL — HIGH (ref 70–99)
GLUCOSE BLDC GLUCOMTR-MCNC: 166 MG/DL — HIGH (ref 70–99)
GLUCOSE BLDC GLUCOMTR-MCNC: 167 MG/DL — HIGH (ref 70–99)
GLUCOSE BLDC GLUCOMTR-MCNC: 168 MG/DL — HIGH (ref 70–99)
GLUCOSE BLDC GLUCOMTR-MCNC: 169 MG/DL — HIGH (ref 70–99)
GLUCOSE BLDC GLUCOMTR-MCNC: 170 MG/DL — HIGH (ref 70–99)
GLUCOSE BLDC GLUCOMTR-MCNC: 170 MG/DL — HIGH (ref 70–99)
GLUCOSE BLDC GLUCOMTR-MCNC: 175 MG/DL — HIGH (ref 70–99)
GLUCOSE BLDC GLUCOMTR-MCNC: 176 MG/DL — HIGH (ref 70–99)
GLUCOSE BLDC GLUCOMTR-MCNC: 176 MG/DL — HIGH (ref 70–99)
GLUCOSE BLDC GLUCOMTR-MCNC: 181 MG/DL — HIGH (ref 70–99)
GLUCOSE BLDC GLUCOMTR-MCNC: 181 MG/DL — HIGH (ref 70–99)
GLUCOSE BLDC GLUCOMTR-MCNC: 182 MG/DL — HIGH (ref 70–99)
GLUCOSE BLDC GLUCOMTR-MCNC: 183 MG/DL — HIGH (ref 70–99)
GLUCOSE BLDC GLUCOMTR-MCNC: 185 MG/DL — HIGH (ref 70–99)
GLUCOSE BLDC GLUCOMTR-MCNC: 185 MG/DL — HIGH (ref 70–99)
GLUCOSE BLDC GLUCOMTR-MCNC: 186 MG/DL — HIGH (ref 70–99)
GLUCOSE BLDC GLUCOMTR-MCNC: 187 MG/DL — HIGH (ref 70–99)
GLUCOSE BLDC GLUCOMTR-MCNC: 188 MG/DL — HIGH (ref 70–99)
GLUCOSE BLDC GLUCOMTR-MCNC: 212 MG/DL — HIGH (ref 70–99)
GLUCOSE BLDC GLUCOMTR-MCNC: 212 MG/DL — HIGH (ref 70–99)
GLUCOSE BLDC GLUCOMTR-MCNC: 228 MG/DL — HIGH (ref 70–99)
GLUCOSE BLDC GLUCOMTR-MCNC: 235 MG/DL — HIGH (ref 70–99)
GLUCOSE BLDC GLUCOMTR-MCNC: 237 MG/DL — HIGH (ref 70–99)
GLUCOSE BLDC GLUCOMTR-MCNC: 242 MG/DL — HIGH (ref 70–99)
GLUCOSE BLDC GLUCOMTR-MCNC: 262 MG/DL — HIGH (ref 70–99)
GLUCOSE BLDC GLUCOMTR-MCNC: 265 MG/DL — HIGH (ref 70–99)
GLUCOSE BLDC GLUCOMTR-MCNC: 275 MG/DL — HIGH (ref 70–99)
GLUCOSE BLDC GLUCOMTR-MCNC: 285 MG/DL — HIGH (ref 70–99)
GLUCOSE BLDC GLUCOMTR-MCNC: 303 MG/DL — HIGH (ref 70–99)
GLUCOSE BLDC GLUCOMTR-MCNC: 315 MG/DL — HIGH (ref 70–99)
GLUCOSE BLDV-MCNC: 196 MG/DL — HIGH (ref 70–99)
GLUCOSE BLDV-MCNC: 236 MG/DL — HIGH (ref 70–99)
GLUCOSE BLDV-MCNC: 277 MG/DL — HIGH (ref 70–99)
GLUCOSE SERPL-MCNC: 153 MG/DL — HIGH (ref 70–99)
GLUCOSE SERPL-MCNC: 169 MG/DL — HIGH (ref 70–99)
GLUCOSE SERPL-MCNC: 171 MG/DL — HIGH (ref 70–99)
GLUCOSE SERPL-MCNC: 173 MG/DL — HIGH (ref 70–99)
GLUCOSE SERPL-MCNC: 182 MG/DL — HIGH (ref 70–99)
GLUCOSE SERPL-MCNC: 186 MG/DL — HIGH (ref 70–99)
GLUCOSE SERPL-MCNC: 204 MG/DL — HIGH (ref 70–99)
GLUCOSE SERPL-MCNC: 207 MG/DL — HIGH (ref 70–99)
GLUCOSE SERPL-MCNC: 214 MG/DL — HIGH (ref 70–99)
GLUCOSE SERPL-MCNC: 219 MG/DL — HIGH (ref 70–99)
GLUCOSE SERPL-MCNC: 248 MG/DL — HIGH (ref 70–99)
GLUCOSE SERPL-MCNC: 256 MG/DL — HIGH (ref 70–99)
GLUCOSE UR QL: NEGATIVE — SIGNIFICANT CHANGE UP
GRAM STN FLD: SIGNIFICANT CHANGE UP
HCO3 BLDV-SCNC: 25 MMOL/L — SIGNIFICANT CHANGE UP (ref 22–29)
HCO3 BLDV-SCNC: 27 MMOL/L — SIGNIFICANT CHANGE UP (ref 22–29)
HCO3 BLDV-SCNC: 30 MMOL/L — HIGH (ref 22–29)
HCT VFR BLD CALC: 27.6 % — LOW (ref 34.5–45)
HCT VFR BLD CALC: 29.6 % — LOW (ref 34.5–45)
HCT VFR BLD CALC: 29.9 % — LOW (ref 34.5–45)
HCT VFR BLD CALC: 30.1 % — LOW (ref 34.5–45)
HCT VFR BLD CALC: 30.4 % — LOW (ref 34.5–45)
HCT VFR BLD CALC: 30.9 % — LOW (ref 34.5–45)
HCT VFR BLD CALC: 31.3 % — LOW (ref 34.5–45)
HCT VFR BLD CALC: 31.7 % — LOW (ref 34.5–45)
HCT VFR BLD CALC: 31.9 % — LOW (ref 34.5–45)
HCT VFR BLD CALC: 32.1 % — LOW (ref 34.5–45)
HCT VFR BLD CALC: 32.9 % — LOW (ref 34.5–45)
HCT VFR BLD CALC: 34.4 % — LOW (ref 34.5–45)
HCT VFR BLDA CALC: 29 % — LOW (ref 34.5–46.5)
HCT VFR BLDA CALC: 32 % — LOW (ref 34.5–46.5)
HCT VFR BLDA CALC: 35 % — SIGNIFICANT CHANGE UP (ref 34.5–46.5)
HGB BLD CALC-MCNC: 10.6 G/DL — LOW (ref 11.7–16.1)
HGB BLD CALC-MCNC: 11.6 G/DL — LOW (ref 11.7–16.1)
HGB BLD CALC-MCNC: 9.6 G/DL — LOW (ref 11.7–16.1)
HGB BLD-MCNC: 10 G/DL — LOW (ref 11.5–15.5)
HGB BLD-MCNC: 10 G/DL — LOW (ref 11.5–15.5)
HGB BLD-MCNC: 10.2 G/DL — LOW (ref 11.5–15.5)
HGB BLD-MCNC: 10.6 G/DL — LOW (ref 11.5–15.5)
HGB BLD-MCNC: 8.9 G/DL — LOW (ref 11.5–15.5)
HGB BLD-MCNC: 9.2 G/DL — LOW (ref 11.5–15.5)
HGB BLD-MCNC: 9.4 G/DL — LOW (ref 11.5–15.5)
HGB BLD-MCNC: 9.5 G/DL — LOW (ref 11.5–15.5)
HGB BLD-MCNC: 9.6 G/DL — LOW (ref 11.5–15.5)
HGB BLD-MCNC: 9.6 G/DL — LOW (ref 11.5–15.5)
HGB BLD-MCNC: 9.8 G/DL — LOW (ref 11.5–15.5)
HGB BLD-MCNC: 9.9 G/DL — LOW (ref 11.5–15.5)
HYALINE CASTS # UR AUTO: 2 /LPF — SIGNIFICANT CHANGE UP (ref 0–2)
IMM GRANULOCYTES NFR BLD AUTO: 1 % — SIGNIFICANT CHANGE UP (ref 0–1.5)
IMM GRANULOCYTES NFR BLD AUTO: 1.1 % — SIGNIFICANT CHANGE UP (ref 0–1.5)
IMM GRANULOCYTES NFR BLD AUTO: 1.1 % — SIGNIFICANT CHANGE UP (ref 0–1.5)
IMM GRANULOCYTES NFR BLD AUTO: 1.3 % — SIGNIFICANT CHANGE UP (ref 0–1.5)
IMM GRANULOCYTES NFR BLD AUTO: 1.7 % — HIGH (ref 0–1.5)
IMM GRANULOCYTES NFR BLD AUTO: 2.3 % — HIGH (ref 0–1.5)
INR BLD: 1.01 RATIO — SIGNIFICANT CHANGE UP (ref 0.88–1.16)
INR BLD: 1.04 RATIO — SIGNIFICANT CHANGE UP (ref 0.88–1.16)
INR BLD: 1.1 RATIO — SIGNIFICANT CHANGE UP (ref 0.88–1.16)
KETONES UR-MCNC: NEGATIVE — SIGNIFICANT CHANGE UP
LACTATE BLDV-MCNC: 2.6 MMOL/L — HIGH (ref 0.7–2)
LACTATE BLDV-MCNC: 3.3 MMOL/L — HIGH (ref 0.7–2)
LACTATE BLDV-MCNC: 3.4 MMOL/L — HIGH (ref 0.7–2)
LEGIONELLA AG UR QL: NEGATIVE — SIGNIFICANT CHANGE UP
LEUKOCYTE ESTERASE UR-ACNC: NEGATIVE — SIGNIFICANT CHANGE UP
LYMPHOCYTES # BLD AUTO: 0.55 K/UL — LOW (ref 1–3.3)
LYMPHOCYTES # BLD AUTO: 0.83 K/UL — LOW (ref 1–3.3)
LYMPHOCYTES # BLD AUTO: 0.87 K/UL — LOW (ref 1–3.3)
LYMPHOCYTES # BLD AUTO: 0.94 K/UL — LOW (ref 1–3.3)
LYMPHOCYTES # BLD AUTO: 1.22 K/UL — SIGNIFICANT CHANGE UP (ref 1–3.3)
LYMPHOCYTES # BLD AUTO: 1.24 K/UL — SIGNIFICANT CHANGE UP (ref 1–3.3)
LYMPHOCYTES # BLD AUTO: 1.54 K/UL — SIGNIFICANT CHANGE UP (ref 1–3.3)
LYMPHOCYTES # BLD AUTO: 11.8 % — LOW (ref 13–44)
LYMPHOCYTES # BLD AUTO: 14.2 % — SIGNIFICANT CHANGE UP (ref 13–44)
LYMPHOCYTES # BLD AUTO: 4.1 % — LOW (ref 13–44)
LYMPHOCYTES # BLD AUTO: 5.3 % — LOW (ref 13–44)
LYMPHOCYTES # BLD AUTO: 6.9 % — LOW (ref 13–44)
LYMPHOCYTES # BLD AUTO: 7.6 % — LOW (ref 13–44)
LYMPHOCYTES # BLD AUTO: 8.4 % — LOW (ref 13–44)
MAGNESIUM SERPL-MCNC: 1.9 MG/DL — SIGNIFICANT CHANGE UP (ref 1.6–2.6)
MAGNESIUM SERPL-MCNC: 2 MG/DL — SIGNIFICANT CHANGE UP (ref 1.6–2.6)
MAGNESIUM SERPL-MCNC: 2 MG/DL — SIGNIFICANT CHANGE UP (ref 1.6–2.6)
MAGNESIUM SERPL-MCNC: 2.1 MG/DL — SIGNIFICANT CHANGE UP (ref 1.6–2.6)
MAGNESIUM SERPL-MCNC: 2.2 MG/DL — SIGNIFICANT CHANGE UP (ref 1.6–2.6)
MAGNESIUM SERPL-MCNC: 2.3 MG/DL — SIGNIFICANT CHANGE UP (ref 1.6–2.6)
MAGNESIUM SERPL-MCNC: 2.6 MG/DL — SIGNIFICANT CHANGE UP (ref 1.6–2.6)
MAGNESIUM SERPL-MCNC: 2.6 MG/DL — SIGNIFICANT CHANGE UP (ref 1.6–2.6)
MANUAL SMEAR VERIFICATION: SIGNIFICANT CHANGE UP
MCHC RBC-ENTMCNC: 30.1 GM/DL — LOW (ref 32–36)
MCHC RBC-ENTMCNC: 30.8 GM/DL — LOW (ref 32–36)
MCHC RBC-ENTMCNC: 30.8 GM/DL — LOW (ref 32–36)
MCHC RBC-ENTMCNC: 31 GM/DL — LOW (ref 32–36)
MCHC RBC-ENTMCNC: 31.1 GM/DL — LOW (ref 32–36)
MCHC RBC-ENTMCNC: 31.3 GM/DL — LOW (ref 32–36)
MCHC RBC-ENTMCNC: 31.4 GM/DL — LOW (ref 32–36)
MCHC RBC-ENTMCNC: 31.5 GM/DL — LOW (ref 32–36)
MCHC RBC-ENTMCNC: 31.5 PG — SIGNIFICANT CHANGE UP (ref 27–34)
MCHC RBC-ENTMCNC: 31.6 GM/DL — LOW (ref 32–36)
MCHC RBC-ENTMCNC: 31.6 GM/DL — LOW (ref 32–36)
MCHC RBC-ENTMCNC: 31.9 PG — SIGNIFICANT CHANGE UP (ref 27–34)
MCHC RBC-ENTMCNC: 31.9 PG — SIGNIFICANT CHANGE UP (ref 27–34)
MCHC RBC-ENTMCNC: 32 PG — SIGNIFICANT CHANGE UP (ref 27–34)
MCHC RBC-ENTMCNC: 32.1 PG — SIGNIFICANT CHANGE UP (ref 27–34)
MCHC RBC-ENTMCNC: 32.2 GM/DL — SIGNIFICANT CHANGE UP (ref 32–36)
MCHC RBC-ENTMCNC: 32.3 PG — SIGNIFICANT CHANGE UP (ref 27–34)
MCHC RBC-ENTMCNC: 32.3 PG — SIGNIFICANT CHANGE UP (ref 27–34)
MCHC RBC-ENTMCNC: 32.4 GM/DL — SIGNIFICANT CHANGE UP (ref 32–36)
MCHC RBC-ENTMCNC: 32.4 PG — SIGNIFICANT CHANGE UP (ref 27–34)
MCHC RBC-ENTMCNC: 32.4 PG — SIGNIFICANT CHANGE UP (ref 27–34)
MCHC RBC-ENTMCNC: 32.5 PG — SIGNIFICANT CHANGE UP (ref 27–34)
MCHC RBC-ENTMCNC: 32.5 PG — SIGNIFICANT CHANGE UP (ref 27–34)
MCHC RBC-ENTMCNC: 33 PG — SIGNIFICANT CHANGE UP (ref 27–34)
MCV RBC AUTO: 100.3 FL — HIGH (ref 80–100)
MCV RBC AUTO: 101.3 FL — HIGH (ref 80–100)
MCV RBC AUTO: 102 FL — HIGH (ref 80–100)
MCV RBC AUTO: 102.2 FL — HIGH (ref 80–100)
MCV RBC AUTO: 102.3 FL — HIGH (ref 80–100)
MCV RBC AUTO: 102.4 FL — HIGH (ref 80–100)
MCV RBC AUTO: 103.5 FL — HIGH (ref 80–100)
MCV RBC AUTO: 103.6 FL — HIGH (ref 80–100)
MCV RBC AUTO: 103.6 FL — HIGH (ref 80–100)
MCV RBC AUTO: 104.2 FL — HIGH (ref 80–100)
MCV RBC AUTO: 104.4 FL — HIGH (ref 80–100)
MCV RBC AUTO: 104.6 FL — HIGH (ref 80–100)
METAMYELOCYTES # FLD: 0.9 % — HIGH (ref 0–0)
METHOD TYPE: SIGNIFICANT CHANGE UP
MONOCYTES # BLD AUTO: 0.43 K/UL — SIGNIFICANT CHANGE UP (ref 0–0.9)
MONOCYTES # BLD AUTO: 0.64 K/UL — SIGNIFICANT CHANGE UP (ref 0–0.9)
MONOCYTES # BLD AUTO: 0.7 K/UL — SIGNIFICANT CHANGE UP (ref 0–0.9)
MONOCYTES # BLD AUTO: 0.77 K/UL — SIGNIFICANT CHANGE UP (ref 0–0.9)
MONOCYTES # BLD AUTO: 1.14 K/UL — HIGH (ref 0–0.9)
MONOCYTES # BLD AUTO: 1.19 K/UL — HIGH (ref 0–0.9)
MONOCYTES # BLD AUTO: 1.72 K/UL — HIGH (ref 0–0.9)
MONOCYTES NFR BLD AUTO: 3.2 % — SIGNIFICANT CHANGE UP (ref 2–14)
MONOCYTES NFR BLD AUTO: 5.1 % — SIGNIFICANT CHANGE UP (ref 2–14)
MONOCYTES NFR BLD AUTO: 6.4 % — SIGNIFICANT CHANGE UP (ref 2–14)
MONOCYTES NFR BLD AUTO: 8.1 % — SIGNIFICANT CHANGE UP (ref 2–14)
MONOCYTES NFR BLD AUTO: 8.7 % — SIGNIFICANT CHANGE UP (ref 2–14)
MONOCYTES NFR BLD AUTO: 9 % — SIGNIFICANT CHANGE UP (ref 2–14)
MONOCYTES NFR BLD AUTO: 9.7 % — SIGNIFICANT CHANGE UP (ref 2–14)
NEUTROPHILS # BLD AUTO: 10.2 K/UL — HIGH (ref 1.8–7.4)
NEUTROPHILS # BLD AUTO: 10.67 K/UL — HIGH (ref 1.8–7.4)
NEUTROPHILS # BLD AUTO: 11.93 K/UL — HIGH (ref 1.8–7.4)
NEUTROPHILS # BLD AUTO: 12.15 K/UL — HIGH (ref 1.8–7.4)
NEUTROPHILS # BLD AUTO: 14.78 K/UL — HIGH (ref 1.8–7.4)
NEUTROPHILS # BLD AUTO: 6.12 K/UL — SIGNIFICANT CHANGE UP (ref 1.8–7.4)
NEUTROPHILS # BLD AUTO: 8.9 K/UL — HIGH (ref 1.8–7.4)
NEUTROPHILS NFR BLD AUTO: 71.3 % — SIGNIFICANT CHANGE UP (ref 43–77)
NEUTROPHILS NFR BLD AUTO: 78.1 % — HIGH (ref 43–77)
NEUTROPHILS NFR BLD AUTO: 80.5 % — HIGH (ref 43–77)
NEUTROPHILS NFR BLD AUTO: 81.2 % — HIGH (ref 43–77)
NEUTROPHILS NFR BLD AUTO: 81.7 % — HIGH (ref 43–77)
NEUTROPHILS NFR BLD AUTO: 84.5 % — HIGH (ref 43–77)
NEUTROPHILS NFR BLD AUTO: 90.1 % — HIGH (ref 43–77)
NEUTS BAND # BLD: 2.7 % — SIGNIFICANT CHANGE UP (ref 0–8)
NITRITE UR-MCNC: NEGATIVE — SIGNIFICANT CHANGE UP
NRBC # BLD: 0 /100 WBCS — SIGNIFICANT CHANGE UP (ref 0–0)
NT-PROBNP SERPL-SCNC: 375 PG/ML — HIGH (ref 0–300)
ORGANISM # SPEC MICROSCOPIC CNT: SIGNIFICANT CHANGE UP
PCO2 BLDV: 42 MMHG — SIGNIFICANT CHANGE UP (ref 39–42)
PCO2 BLDV: 48 MMHG — HIGH (ref 39–42)
PCO2 BLDV: 52 MMHG — HIGH (ref 39–42)
PH BLDV: 7.36 — SIGNIFICANT CHANGE UP (ref 7.32–7.43)
PH BLDV: 7.37 — SIGNIFICANT CHANGE UP (ref 7.32–7.43)
PH BLDV: 7.38 — SIGNIFICANT CHANGE UP (ref 7.32–7.43)
PH UR: 6 — SIGNIFICANT CHANGE UP (ref 5–8)
PHOSPHATE SERPL-MCNC: 2.7 MG/DL — SIGNIFICANT CHANGE UP (ref 2.5–4.5)
PHOSPHATE SERPL-MCNC: 2.8 MG/DL — SIGNIFICANT CHANGE UP (ref 2.5–4.5)
PHOSPHATE SERPL-MCNC: 3 MG/DL — SIGNIFICANT CHANGE UP (ref 2.5–4.5)
PHOSPHATE SERPL-MCNC: 3 MG/DL — SIGNIFICANT CHANGE UP (ref 2.5–4.5)
PHOSPHATE SERPL-MCNC: 3.1 MG/DL — SIGNIFICANT CHANGE UP (ref 2.5–4.5)
PHOSPHATE SERPL-MCNC: 3.4 MG/DL — SIGNIFICANT CHANGE UP (ref 2.5–4.5)
PHOSPHATE SERPL-MCNC: 3.6 MG/DL — SIGNIFICANT CHANGE UP (ref 2.5–4.5)
PHOSPHATE SERPL-MCNC: 3.6 MG/DL — SIGNIFICANT CHANGE UP (ref 2.5–4.5)
PLAT MORPH BLD: ABNORMAL
PLATELET # BLD AUTO: 229 K/UL — SIGNIFICANT CHANGE UP (ref 150–400)
PLATELET # BLD AUTO: 236 K/UL — SIGNIFICANT CHANGE UP (ref 150–400)
PLATELET # BLD AUTO: 247 K/UL — SIGNIFICANT CHANGE UP (ref 150–400)
PLATELET # BLD AUTO: 248 K/UL — SIGNIFICANT CHANGE UP (ref 150–400)
PLATELET # BLD AUTO: 256 K/UL — SIGNIFICANT CHANGE UP (ref 150–400)
PLATELET # BLD AUTO: 258 K/UL — SIGNIFICANT CHANGE UP (ref 150–400)
PLATELET # BLD AUTO: 265 K/UL — SIGNIFICANT CHANGE UP (ref 150–400)
PLATELET # BLD AUTO: 265 K/UL — SIGNIFICANT CHANGE UP (ref 150–400)
PLATELET # BLD AUTO: 266 K/UL — SIGNIFICANT CHANGE UP (ref 150–400)
PLATELET # BLD AUTO: 270 K/UL — SIGNIFICANT CHANGE UP (ref 150–400)
PLATELET # BLD AUTO: 273 K/UL — SIGNIFICANT CHANGE UP (ref 150–400)
PLATELET # BLD AUTO: 293 K/UL — SIGNIFICANT CHANGE UP (ref 150–400)
PO2 BLDV: 180 MMHG — HIGH (ref 25–45)
PO2 BLDV: 25 MMHG — SIGNIFICANT CHANGE UP (ref 25–45)
PO2 BLDV: 45 MMHG — SIGNIFICANT CHANGE UP (ref 25–45)
POTASSIUM BLDV-SCNC: 4.3 MMOL/L — SIGNIFICANT CHANGE UP (ref 3.5–5.1)
POTASSIUM BLDV-SCNC: 4.5 MMOL/L — SIGNIFICANT CHANGE UP (ref 3.5–5.1)
POTASSIUM BLDV-SCNC: 6.5 MMOL/L — CRITICAL HIGH (ref 3.5–5.1)
POTASSIUM SERPL-MCNC: 3.4 MMOL/L — LOW (ref 3.5–5.3)
POTASSIUM SERPL-MCNC: 3.7 MMOL/L — SIGNIFICANT CHANGE UP (ref 3.5–5.3)
POTASSIUM SERPL-MCNC: 4.1 MMOL/L — SIGNIFICANT CHANGE UP (ref 3.5–5.3)
POTASSIUM SERPL-MCNC: 4.2 MMOL/L — SIGNIFICANT CHANGE UP (ref 3.5–5.3)
POTASSIUM SERPL-MCNC: 4.4 MMOL/L — SIGNIFICANT CHANGE UP (ref 3.5–5.3)
POTASSIUM SERPL-MCNC: 4.6 MMOL/L — SIGNIFICANT CHANGE UP (ref 3.5–5.3)
POTASSIUM SERPL-MCNC: 4.6 MMOL/L — SIGNIFICANT CHANGE UP (ref 3.5–5.3)
POTASSIUM SERPL-MCNC: 4.8 MMOL/L — SIGNIFICANT CHANGE UP (ref 3.5–5.3)
POTASSIUM SERPL-MCNC: 5.4 MMOL/L — HIGH (ref 3.5–5.3)
POTASSIUM SERPL-MCNC: 5.6 MMOL/L — HIGH (ref 3.5–5.3)
POTASSIUM SERPL-SCNC: 3.4 MMOL/L — LOW (ref 3.5–5.3)
POTASSIUM SERPL-SCNC: 3.7 MMOL/L — SIGNIFICANT CHANGE UP (ref 3.5–5.3)
POTASSIUM SERPL-SCNC: 4.1 MMOL/L — SIGNIFICANT CHANGE UP (ref 3.5–5.3)
POTASSIUM SERPL-SCNC: 4.2 MMOL/L — SIGNIFICANT CHANGE UP (ref 3.5–5.3)
POTASSIUM SERPL-SCNC: 4.4 MMOL/L — SIGNIFICANT CHANGE UP (ref 3.5–5.3)
POTASSIUM SERPL-SCNC: 4.6 MMOL/L — SIGNIFICANT CHANGE UP (ref 3.5–5.3)
POTASSIUM SERPL-SCNC: 4.6 MMOL/L — SIGNIFICANT CHANGE UP (ref 3.5–5.3)
POTASSIUM SERPL-SCNC: 4.8 MMOL/L — SIGNIFICANT CHANGE UP (ref 3.5–5.3)
POTASSIUM SERPL-SCNC: 5.4 MMOL/L — HIGH (ref 3.5–5.3)
POTASSIUM SERPL-SCNC: 5.6 MMOL/L — HIGH (ref 3.5–5.3)
PROT SERPL-MCNC: 5.9 G/DL — LOW (ref 6–8.3)
PROT SERPL-MCNC: 6.5 G/DL — SIGNIFICANT CHANGE UP (ref 6–8.3)
PROT SERPL-MCNC: 6.5 G/DL — SIGNIFICANT CHANGE UP (ref 6–8.3)
PROT SERPL-MCNC: 6.6 G/DL — SIGNIFICANT CHANGE UP (ref 6–8.3)
PROT SERPL-MCNC: 6.7 G/DL — SIGNIFICANT CHANGE UP (ref 6–8.3)
PROT SERPL-MCNC: 6.9 G/DL — SIGNIFICANT CHANGE UP (ref 6–8.3)
PROT SERPL-MCNC: 7.1 G/DL — SIGNIFICANT CHANGE UP (ref 6–8.3)
PROT SERPL-MCNC: 7.2 G/DL — SIGNIFICANT CHANGE UP (ref 6–8.3)
PROT SERPL-MCNC: 7.5 G/DL — SIGNIFICANT CHANGE UP (ref 6–8.3)
PROT SERPL-MCNC: 7.8 G/DL — SIGNIFICANT CHANGE UP (ref 6–8.3)
PROT UR-MCNC: ABNORMAL
PROTHROM AB SERPL-ACNC: 12.1 SEC — SIGNIFICANT CHANGE UP (ref 10.6–13.6)
PROTHROM AB SERPL-ACNC: 12.4 SEC — SIGNIFICANT CHANGE UP (ref 10.6–13.6)
PROTHROM AB SERPL-ACNC: 13.1 SEC — SIGNIFICANT CHANGE UP (ref 10.6–13.6)
RAPID RVP RESULT: SIGNIFICANT CHANGE UP
RBC # BLD: 2.7 M/UL — LOW (ref 3.8–5.2)
RBC # BLD: 2.84 M/UL — LOW (ref 3.8–5.2)
RBC # BLD: 2.93 M/UL — LOW (ref 3.8–5.2)
RBC # BLD: 2.97 M/UL — LOW (ref 3.8–5.2)
RBC # BLD: 2.97 M/UL — LOW (ref 3.8–5.2)
RBC # BLD: 3.02 M/UL — LOW (ref 3.8–5.2)
RBC # BLD: 3.05 M/UL — LOW (ref 3.8–5.2)
RBC # BLD: 3.08 M/UL — LOW (ref 3.8–5.2)
RBC # BLD: 3.1 M/UL — LOW (ref 3.8–5.2)
RBC # BLD: 3.1 M/UL — LOW (ref 3.8–5.2)
RBC # BLD: 3.15 M/UL — LOW (ref 3.8–5.2)
RBC # BLD: 3.32 M/UL — LOW (ref 3.8–5.2)
RBC # FLD: 15 % — HIGH (ref 10.3–14.5)
RBC # FLD: 15.2 % — HIGH (ref 10.3–14.5)
RBC # FLD: 15.2 % — HIGH (ref 10.3–14.5)
RBC # FLD: 15.3 % — HIGH (ref 10.3–14.5)
RBC # FLD: 15.4 % — HIGH (ref 10.3–14.5)
RBC # FLD: 15.6 % — HIGH (ref 10.3–14.5)
RBC # FLD: 15.6 % — HIGH (ref 10.3–14.5)
RBC # FLD: 15.8 % — HIGH (ref 10.3–14.5)
RBC # FLD: 15.9 % — HIGH (ref 10.3–14.5)
RBC BLD AUTO: SIGNIFICANT CHANGE UP
RBC CASTS # UR COMP ASSIST: 1 /HPF — SIGNIFICANT CHANGE UP (ref 0–4)
RH IG SCN BLD-IMP: POSITIVE — SIGNIFICANT CHANGE UP
SAO2 % BLDV: 100 % — HIGH (ref 67–88)
SAO2 % BLDV: 38.2 % — LOW (ref 67–88)
SAO2 % BLDV: 75.5 % — SIGNIFICANT CHANGE UP (ref 67–88)
SARS-COV-2 RNA SPEC QL NAA+PROBE: SIGNIFICANT CHANGE UP
SARS-COV-2 RNA SPEC QL NAA+PROBE: SIGNIFICANT CHANGE UP
SODIUM SERPL-SCNC: 132 MMOL/L — LOW (ref 135–145)
SODIUM SERPL-SCNC: 133 MMOL/L — LOW (ref 135–145)
SODIUM SERPL-SCNC: 133 MMOL/L — LOW (ref 135–145)
SODIUM SERPL-SCNC: 135 MMOL/L — SIGNIFICANT CHANGE UP (ref 135–145)
SODIUM SERPL-SCNC: 135 MMOL/L — SIGNIFICANT CHANGE UP (ref 135–145)
SODIUM SERPL-SCNC: 136 MMOL/L — SIGNIFICANT CHANGE UP (ref 135–145)
SODIUM SERPL-SCNC: 137 MMOL/L — SIGNIFICANT CHANGE UP (ref 135–145)
SODIUM SERPL-SCNC: 137 MMOL/L — SIGNIFICANT CHANGE UP (ref 135–145)
SODIUM SERPL-SCNC: 141 MMOL/L — SIGNIFICANT CHANGE UP (ref 135–145)
SODIUM SERPL-SCNC: 143 MMOL/L — SIGNIFICANT CHANGE UP (ref 135–145)
SP GR SPEC: 1.02 — SIGNIFICANT CHANGE UP (ref 1.01–1.02)
SPECIMEN SOURCE: SIGNIFICANT CHANGE UP
TROPONIN T, HIGH SENSITIVITY RESULT: 17 NG/L — SIGNIFICANT CHANGE UP (ref 0–51)
TROPONIN T, HIGH SENSITIVITY RESULT: 26 NG/L — SIGNIFICANT CHANGE UP (ref 0–51)
UROBILINOGEN FLD QL: ABNORMAL
VANCOMYCIN FLD-MCNC: 14.9 UG/ML — SIGNIFICANT CHANGE UP
VANCOMYCIN FLD-MCNC: 18.2 UG/ML — SIGNIFICANT CHANGE UP
VANCOMYCIN TROUGH SERPL-MCNC: 18.4 UG/ML — SIGNIFICANT CHANGE UP (ref 10–20)
VANCOMYCIN TROUGH SERPL-MCNC: 21 UG/ML — HIGH (ref 10–20)
VANCOMYCIN TROUGH SERPL-MCNC: 22.8 UG/ML — HIGH (ref 10–20)
WBC # BLD: 10.89 K/UL — HIGH (ref 3.8–10.5)
WBC # BLD: 11.07 K/UL — HIGH (ref 3.8–10.5)
WBC # BLD: 12.63 K/UL — HIGH (ref 3.8–10.5)
WBC # BLD: 13.06 K/UL — HIGH (ref 3.8–10.5)
WBC # BLD: 13.48 K/UL — HIGH (ref 3.8–10.5)
WBC # BLD: 14.7 K/UL — HIGH (ref 3.8–10.5)
WBC # BLD: 17.06 K/UL — HIGH (ref 3.8–10.5)
WBC # BLD: 17.77 K/UL — HIGH (ref 3.8–10.5)
WBC # BLD: 7.23 K/UL — SIGNIFICANT CHANGE UP (ref 3.8–10.5)
WBC # BLD: 8.58 K/UL — SIGNIFICANT CHANGE UP (ref 3.8–10.5)
WBC # BLD: 8.61 K/UL — SIGNIFICANT CHANGE UP (ref 3.8–10.5)
WBC # BLD: 9.09 K/UL — SIGNIFICANT CHANGE UP (ref 3.8–10.5)
WBC # FLD AUTO: 10.89 K/UL — HIGH (ref 3.8–10.5)
WBC # FLD AUTO: 11.07 K/UL — HIGH (ref 3.8–10.5)
WBC # FLD AUTO: 12.63 K/UL — HIGH (ref 3.8–10.5)
WBC # FLD AUTO: 13.06 K/UL — HIGH (ref 3.8–10.5)
WBC # FLD AUTO: 13.48 K/UL — HIGH (ref 3.8–10.5)
WBC # FLD AUTO: 14.7 K/UL — HIGH (ref 3.8–10.5)
WBC # FLD AUTO: 17.06 K/UL — HIGH (ref 3.8–10.5)
WBC # FLD AUTO: 17.77 K/UL — HIGH (ref 3.8–10.5)
WBC # FLD AUTO: 7.23 K/UL — SIGNIFICANT CHANGE UP (ref 3.8–10.5)
WBC # FLD AUTO: 8.58 K/UL — SIGNIFICANT CHANGE UP (ref 3.8–10.5)
WBC # FLD AUTO: 8.61 K/UL — SIGNIFICANT CHANGE UP (ref 3.8–10.5)
WBC # FLD AUTO: 9.09 K/UL — SIGNIFICANT CHANGE UP (ref 3.8–10.5)
WBC UR QL: 1 /HPF — SIGNIFICANT CHANGE UP (ref 0–5)

## 2021-01-01 PROCEDURE — 36573 INSJ PICC RS&I 5 YR+: CPT

## 2021-01-01 PROCEDURE — 99231 SBSQ HOSP IP/OBS SF/LOW 25: CPT

## 2021-01-01 PROCEDURE — 76700 US EXAM ABDOM COMPLETE: CPT | Mod: 26

## 2021-01-01 PROCEDURE — 99233 SBSQ HOSP IP/OBS HIGH 50: CPT

## 2021-01-01 PROCEDURE — 74177 CT ABD & PELVIS W/CONTRAST: CPT | Mod: 26

## 2021-01-01 PROCEDURE — 99232 SBSQ HOSP IP/OBS MODERATE 35: CPT | Mod: GC

## 2021-01-01 PROCEDURE — 99232 SBSQ HOSP IP/OBS MODERATE 35: CPT

## 2021-01-01 PROCEDURE — 71045 X-RAY EXAM CHEST 1 VIEW: CPT | Mod: 26

## 2021-01-01 PROCEDURE — 71260 CT THORAX DX C+: CPT | Mod: 26

## 2021-01-01 PROCEDURE — 93306 TTE W/DOPPLER COMPLETE: CPT | Mod: 26

## 2021-01-01 PROCEDURE — 36000 PLACE NEEDLE IN VEIN: CPT

## 2021-01-01 PROCEDURE — 99223 1ST HOSP IP/OBS HIGH 75: CPT

## 2021-01-01 PROCEDURE — 49424 ASSESS CYST CONTRAST INJECT: CPT

## 2021-01-01 PROCEDURE — 99285 EMERGENCY DEPT VISIT HI MDM: CPT | Mod: 25

## 2021-01-01 PROCEDURE — 76080 X-RAY EXAM OF FISTULA: CPT | Mod: 26

## 2021-01-01 PROCEDURE — 93010 ELECTROCARDIOGRAM REPORT: CPT | Mod: 59

## 2021-01-01 PROCEDURE — 74176 CT ABD & PELVIS W/O CONTRAST: CPT | Mod: 26

## 2021-01-01 PROCEDURE — 49406 IMAGE CATH FLUID PERI/RETRO: CPT

## 2021-01-01 RX ORDER — PIPERACILLIN AND TAZOBACTAM 4; .5 G/20ML; G/20ML
3.38 INJECTION, POWDER, LYOPHILIZED, FOR SOLUTION INTRAVENOUS EVERY 8 HOURS
Refills: 0 | Status: DISCONTINUED | OUTPATIENT
Start: 2021-01-01 | End: 2021-01-01

## 2021-01-01 RX ORDER — LANOLIN ALCOHOL/MO/W.PET/CERES
3 CREAM (GRAM) TOPICAL AT BEDTIME
Refills: 0 | Status: DISCONTINUED | OUTPATIENT
Start: 2021-01-01 | End: 2022-01-01

## 2021-01-01 RX ORDER — FUROSEMIDE 40 MG
20 TABLET ORAL ONCE
Refills: 0 | Status: COMPLETED | OUTPATIENT
Start: 2021-01-01 | End: 2021-01-01

## 2021-01-01 RX ORDER — ACETAMINOPHEN 500 MG
1000 TABLET ORAL ONCE
Refills: 0 | Status: COMPLETED | OUTPATIENT
Start: 2021-01-01 | End: 2021-01-01

## 2021-01-01 RX ORDER — POTASSIUM CHLORIDE 20 MEQ
40 PACKET (EA) ORAL ONCE
Refills: 0 | Status: COMPLETED | OUTPATIENT
Start: 2021-01-01 | End: 2021-01-01

## 2021-01-01 RX ORDER — VANCOMYCIN HCL 1 G
1500 VIAL (EA) INTRAVENOUS EVERY 12 HOURS
Refills: 0 | Status: DISCONTINUED | OUTPATIENT
Start: 2021-01-01 | End: 2021-01-01

## 2021-01-01 RX ORDER — DEXTROSE 50 % IN WATER 50 %
25 SYRINGE (ML) INTRAVENOUS ONCE
Refills: 0 | Status: DISCONTINUED | OUTPATIENT
Start: 2021-01-01 | End: 2022-01-01

## 2021-01-01 RX ORDER — CITALOPRAM 10 MG/1
10 TABLET, FILM COATED ORAL
Qty: 0 | Refills: 0 | DISCHARGE

## 2021-01-01 RX ORDER — IPRATROPIUM/ALBUTEROL SULFATE 18-103MCG
3 AEROSOL WITH ADAPTER (GRAM) INHALATION EVERY 6 HOURS
Refills: 0 | Status: DISCONTINUED | OUTPATIENT
Start: 2021-01-01 | End: 2021-01-01

## 2021-01-01 RX ORDER — ONDANSETRON 8 MG/1
4 TABLET, FILM COATED ORAL ONCE
Refills: 0 | Status: COMPLETED | OUTPATIENT
Start: 2021-01-01 | End: 2021-01-01

## 2021-01-01 RX ORDER — LANOLIN ALCOHOL/MO/W.PET/CERES
1 CREAM (GRAM) TOPICAL
Qty: 0 | Refills: 0 | DISCHARGE
Start: 2021-01-01

## 2021-01-01 RX ORDER — HEPARIN SODIUM 5000 [USP'U]/ML
5000 INJECTION INTRAVENOUS; SUBCUTANEOUS
Qty: 0 | Refills: 0 | DISCHARGE
Start: 2021-01-01

## 2021-01-01 RX ORDER — SODIUM CHLORIDE 9 MG/ML
1000 INJECTION, SOLUTION INTRAVENOUS
Refills: 0 | Status: DISCONTINUED | OUTPATIENT
Start: 2021-01-01 | End: 2021-01-01

## 2021-01-01 RX ORDER — FUROSEMIDE 40 MG
40 TABLET ORAL ONCE
Refills: 0 | Status: COMPLETED | OUTPATIENT
Start: 2021-01-01 | End: 2021-01-01

## 2021-01-01 RX ORDER — POTASSIUM CHLORIDE 20 MEQ
40 PACKET (EA) ORAL ONCE
Refills: 0 | Status: DISCONTINUED | OUTPATIENT
Start: 2021-01-01 | End: 2021-01-01

## 2021-01-01 RX ORDER — VANCOMYCIN HCL 1 G
1 VIAL (EA) INTRAVENOUS
Qty: 0 | Refills: 0 | DISCHARGE
Start: 2021-01-01 | End: 2022-01-01

## 2021-01-01 RX ORDER — VANCOMYCIN HCL 1 G
1250 VIAL (EA) INTRAVENOUS EVERY 24 HOURS
Refills: 0 | Status: DISCONTINUED | OUTPATIENT
Start: 2021-01-01 | End: 2021-01-01

## 2021-01-01 RX ORDER — VANCOMYCIN HCL 1 G
VIAL (EA) INTRAVENOUS
Refills: 0 | Status: DISCONTINUED | OUTPATIENT
Start: 2021-01-01 | End: 2021-01-01

## 2021-01-01 RX ORDER — FAMOTIDINE 10 MG/ML
5 INJECTION INTRAVENOUS
Qty: 0 | Refills: 0 | DISCHARGE

## 2021-01-01 RX ORDER — ALLOPURINOL 300 MG
100 TABLET ORAL DAILY
Refills: 0 | Status: DISCONTINUED | OUTPATIENT
Start: 2021-01-01 | End: 2022-01-01

## 2021-01-01 RX ORDER — PIPERACILLIN AND TAZOBACTAM 4; .5 G/20ML; G/20ML
3.38 INJECTION, POWDER, LYOPHILIZED, FOR SOLUTION INTRAVENOUS ONCE
Refills: 0 | Status: COMPLETED | OUTPATIENT
Start: 2021-01-01 | End: 2021-01-01

## 2021-01-01 RX ORDER — CEFTRIAXONE 500 MG/1
1000 INJECTION, POWDER, FOR SOLUTION INTRAMUSCULAR; INTRAVENOUS ONCE
Refills: 0 | Status: DISCONTINUED | OUTPATIENT
Start: 2021-01-01 | End: 2021-01-01

## 2021-01-01 RX ORDER — ACETAMINOPHEN 500 MG
650 TABLET ORAL EVERY 6 HOURS
Refills: 0 | Status: DISCONTINUED | OUTPATIENT
Start: 2021-01-01 | End: 2022-01-01

## 2021-01-01 RX ORDER — HEPARIN SODIUM 5000 [USP'U]/ML
5000 INJECTION INTRAVENOUS; SUBCUTANEOUS EVERY 8 HOURS
Refills: 0 | Status: DISCONTINUED | OUTPATIENT
Start: 2021-01-01 | End: 2022-01-01

## 2021-01-01 RX ORDER — VANCOMYCIN HCL 1 G
1500 VIAL (EA) INTRAVENOUS EVERY 24 HOURS
Refills: 0 | Status: DISCONTINUED | OUTPATIENT
Start: 2021-01-01 | End: 2021-01-01

## 2021-01-01 RX ORDER — HYDROMORPHONE HYDROCHLORIDE 2 MG/ML
0.25 INJECTION INTRAMUSCULAR; INTRAVENOUS; SUBCUTANEOUS
Refills: 0 | Status: DISCONTINUED | OUTPATIENT
Start: 2021-01-01 | End: 2021-01-01

## 2021-01-01 RX ORDER — LEVETIRACETAM 250 MG/1
750 TABLET, FILM COATED ORAL EVERY 12 HOURS
Refills: 0 | Status: DISCONTINUED | OUTPATIENT
Start: 2021-01-01 | End: 2021-01-01

## 2021-01-01 RX ORDER — SODIUM CHLORIDE 9 MG/ML
1000 INJECTION, SOLUTION INTRAVENOUS
Refills: 0 | Status: DISCONTINUED | OUTPATIENT
Start: 2021-01-01 | End: 2022-01-01

## 2021-01-01 RX ORDER — HEPARIN SODIUM 5000 [USP'U]/ML
5000 INJECTION INTRAVENOUS; SUBCUTANEOUS EVERY 12 HOURS
Refills: 0 | Status: DISCONTINUED | OUTPATIENT
Start: 2021-01-01 | End: 2021-01-01

## 2021-01-01 RX ORDER — SODIUM CHLORIDE 9 MG/ML
10 INJECTION INTRAMUSCULAR; INTRAVENOUS; SUBCUTANEOUS
Refills: 0 | Status: DISCONTINUED | OUTPATIENT
Start: 2021-01-01 | End: 2022-01-01

## 2021-01-01 RX ORDER — DEXTROSE 50 % IN WATER 50 %
12.5 SYRINGE (ML) INTRAVENOUS ONCE
Refills: 0 | Status: DISCONTINUED | OUTPATIENT
Start: 2021-01-01 | End: 2022-01-01

## 2021-01-01 RX ORDER — AMLODIPINE BESYLATE 2.5 MG/1
10 TABLET ORAL DAILY
Refills: 0 | Status: DISCONTINUED | OUTPATIENT
Start: 2021-01-01 | End: 2021-01-01

## 2021-01-01 RX ORDER — ERTAPENEM SODIUM 1 G/1
1000 INJECTION, POWDER, LYOPHILIZED, FOR SOLUTION INTRAMUSCULAR; INTRAVENOUS EVERY 24 HOURS
Refills: 0 | Status: COMPLETED | OUTPATIENT
Start: 2021-01-01 | End: 2022-01-01

## 2021-01-01 RX ORDER — VANCOMYCIN HCL 1 G
1500 VIAL (EA) INTRAVENOUS ONCE
Refills: 0 | Status: COMPLETED | OUTPATIENT
Start: 2021-01-01 | End: 2021-01-01

## 2021-01-01 RX ORDER — ACETAMINOPHEN 500 MG
650 TABLET ORAL ONCE
Refills: 0 | Status: COMPLETED | OUTPATIENT
Start: 2021-01-01 | End: 2021-01-01

## 2021-01-01 RX ORDER — SODIUM ZIRCONIUM CYCLOSILICATE 10 G/10G
5 POWDER, FOR SUSPENSION ORAL ONCE
Refills: 0 | Status: COMPLETED | OUTPATIENT
Start: 2021-01-01 | End: 2021-01-01

## 2021-01-01 RX ORDER — LEVETIRACETAM 250 MG/1
750 TABLET, FILM COATED ORAL
Refills: 0 | Status: DISCONTINUED | OUTPATIENT
Start: 2021-01-01 | End: 2022-01-01

## 2021-01-01 RX ORDER — CEFTRIAXONE 500 MG/1
1000 INJECTION, POWDER, FOR SOLUTION INTRAMUSCULAR; INTRAVENOUS EVERY 24 HOURS
Refills: 0 | Status: DISCONTINUED | OUTPATIENT
Start: 2021-01-01 | End: 2021-01-01

## 2021-01-01 RX ORDER — NYSTATIN CREAM 100000 [USP'U]/G
1 CREAM TOPICAL
Qty: 0 | Refills: 0 | DISCHARGE
Start: 2021-01-01

## 2021-01-01 RX ORDER — IPRATROPIUM/ALBUTEROL SULFATE 18-103MCG
3 AEROSOL WITH ADAPTER (GRAM) INHALATION
Refills: 0 | Status: DISCONTINUED | OUTPATIENT
Start: 2021-01-01 | End: 2021-01-01

## 2021-01-01 RX ORDER — IPRATROPIUM/ALBUTEROL SULFATE 18-103MCG
3 AEROSOL WITH ADAPTER (GRAM) INHALATION ONCE
Refills: 0 | Status: COMPLETED | OUTPATIENT
Start: 2021-01-01 | End: 2021-01-01

## 2021-01-01 RX ORDER — AMLODIPINE BESYLATE 2.5 MG/1
5 TABLET ORAL DAILY
Refills: 0 | Status: DISCONTINUED | OUTPATIENT
Start: 2021-01-01 | End: 2022-01-01

## 2021-01-01 RX ORDER — CEFEPIME 1 G/1
1000 INJECTION, POWDER, FOR SOLUTION INTRAMUSCULAR; INTRAVENOUS EVERY 8 HOURS
Refills: 0 | Status: DISCONTINUED | OUTPATIENT
Start: 2021-01-01 | End: 2021-01-01

## 2021-01-01 RX ORDER — LEVETIRACETAM 250 MG/1
750 TABLET, FILM COATED ORAL
Refills: 0 | Status: DISCONTINUED | OUTPATIENT
Start: 2021-01-01 | End: 2021-01-01

## 2021-01-01 RX ORDER — ERTAPENEM SODIUM 1 G/1
1 INJECTION, POWDER, LYOPHILIZED, FOR SOLUTION INTRAMUSCULAR; INTRAVENOUS
Qty: 0 | Refills: 0 | DISCHARGE
Start: 2021-01-01 | End: 2022-01-01

## 2021-01-01 RX ORDER — IPRATROPIUM/ALBUTEROL SULFATE 18-103MCG
3 AEROSOL WITH ADAPTER (GRAM) INHALATION ONCE
Refills: 0 | Status: DISCONTINUED | OUTPATIENT
Start: 2021-01-01 | End: 2021-01-01

## 2021-01-01 RX ORDER — DEXTROSE 50 % IN WATER 50 %
15 SYRINGE (ML) INTRAVENOUS ONCE
Refills: 0 | Status: DISCONTINUED | OUTPATIENT
Start: 2021-01-01 | End: 2022-01-01

## 2021-01-01 RX ORDER — SODIUM CHLORIDE 9 MG/ML
1000 INJECTION INTRAMUSCULAR; INTRAVENOUS; SUBCUTANEOUS ONCE
Refills: 0 | Status: COMPLETED | OUTPATIENT
Start: 2021-01-01 | End: 2021-01-01

## 2021-01-01 RX ORDER — DOCUSATE SODIUM 100 MG
50 CAPSULE ORAL
Qty: 0 | Refills: 0 | DISCHARGE

## 2021-01-01 RX ORDER — AZITHROMYCIN 500 MG/1
500 TABLET, FILM COATED ORAL EVERY 24 HOURS
Refills: 0 | Status: DISCONTINUED | OUTPATIENT
Start: 2021-01-01 | End: 2021-01-01

## 2021-01-01 RX ORDER — VANCOMYCIN HCL 1 G
1250 VIAL (EA) INTRAVENOUS ONCE
Refills: 0 | Status: COMPLETED | OUTPATIENT
Start: 2021-01-01 | End: 2021-01-01

## 2021-01-01 RX ORDER — VANCOMYCIN HCL 1 G
1000 VIAL (EA) INTRAVENOUS EVERY 24 HOURS
Refills: 0 | Status: COMPLETED | OUTPATIENT
Start: 2021-01-01 | End: 2022-01-01

## 2021-01-01 RX ORDER — METRONIDAZOLE 500 MG
500 TABLET ORAL EVERY 12 HOURS
Refills: 0 | Status: DISCONTINUED | OUTPATIENT
Start: 2021-01-01 | End: 2021-01-01

## 2021-01-01 RX ORDER — METOPROLOL TARTRATE 50 MG
25 TABLET ORAL
Refills: 0 | Status: DISCONTINUED | OUTPATIENT
Start: 2021-01-01 | End: 2021-01-01

## 2021-01-01 RX ORDER — GLUCAGON INJECTION, SOLUTION 0.5 MG/.1ML
1 INJECTION, SOLUTION SUBCUTANEOUS ONCE
Refills: 0 | Status: DISCONTINUED | OUTPATIENT
Start: 2021-01-01 | End: 2022-01-01

## 2021-01-01 RX ORDER — SODIUM CHLORIDE 9 MG/ML
1000 INJECTION INTRAMUSCULAR; INTRAVENOUS; SUBCUTANEOUS
Refills: 0 | Status: DISCONTINUED | OUTPATIENT
Start: 2021-01-01 | End: 2021-01-01

## 2021-01-01 RX ORDER — INSULIN LISPRO 100/ML
VIAL (ML) SUBCUTANEOUS EVERY 6 HOURS
Refills: 0 | Status: DISCONTINUED | OUTPATIENT
Start: 2021-01-01 | End: 2022-01-01

## 2021-01-01 RX ORDER — LEVOTHYROXINE SODIUM 125 MCG
75 TABLET ORAL DAILY
Refills: 0 | Status: DISCONTINUED | OUTPATIENT
Start: 2021-01-01 | End: 2022-01-01

## 2021-01-01 RX ORDER — ATORVASTATIN CALCIUM 80 MG/1
20 TABLET, FILM COATED ORAL AT BEDTIME
Refills: 0 | Status: DISCONTINUED | OUTPATIENT
Start: 2021-01-01 | End: 2022-01-01

## 2021-01-01 RX ORDER — METOPROLOL TARTRATE 50 MG
50 TABLET ORAL
Refills: 0 | Status: DISCONTINUED | OUTPATIENT
Start: 2021-01-01 | End: 2022-01-01

## 2021-01-01 RX ORDER — CHLORHEXIDINE GLUCONATE 213 G/1000ML
1 SOLUTION TOPICAL
Refills: 0 | Status: DISCONTINUED | OUTPATIENT
Start: 2021-01-01 | End: 2022-01-01

## 2021-01-01 RX ORDER — NYSTATIN CREAM 100000 [USP'U]/G
1 CREAM TOPICAL
Refills: 0 | Status: DISCONTINUED | OUTPATIENT
Start: 2021-01-01 | End: 2022-01-01

## 2021-01-01 RX ADMIN — Medication 650 MILLIGRAM(S): at 17:51

## 2021-01-01 RX ADMIN — Medication 650 MILLIGRAM(S): at 12:31

## 2021-01-01 RX ADMIN — ATORVASTATIN CALCIUM 20 MILLIGRAM(S): 80 TABLET, FILM COATED ORAL at 22:03

## 2021-01-01 RX ADMIN — Medication 1: at 23:53

## 2021-01-01 RX ADMIN — Medication 25 MILLIGRAM(S): at 06:18

## 2021-01-01 RX ADMIN — Medication 100 MILLIGRAM(S): at 01:24

## 2021-01-01 RX ADMIN — Medication 1 DROP(S): at 17:49

## 2021-01-01 RX ADMIN — Medication 50 MILLIGRAM(S): at 18:03

## 2021-01-01 RX ADMIN — HEPARIN SODIUM 5000 UNIT(S): 5000 INJECTION INTRAVENOUS; SUBCUTANEOUS at 22:28

## 2021-01-01 RX ADMIN — PIPERACILLIN AND TAZOBACTAM 25 GRAM(S): 4; .5 INJECTION, POWDER, LYOPHILIZED, FOR SOLUTION INTRAVENOUS at 21:47

## 2021-01-01 RX ADMIN — Medication 650 MILLIGRAM(S): at 07:37

## 2021-01-01 RX ADMIN — Medication 3 MILLIGRAM(S): at 22:36

## 2021-01-01 RX ADMIN — Medication 100 MILLIGRAM(S): at 12:29

## 2021-01-01 RX ADMIN — LEVETIRACETAM 750 MILLIGRAM(S): 250 TABLET, FILM COATED ORAL at 05:57

## 2021-01-01 RX ADMIN — Medication 3 MILLILITER(S): at 11:10

## 2021-01-01 RX ADMIN — Medication 25 MILLIGRAM(S): at 17:14

## 2021-01-01 RX ADMIN — ATORVASTATIN CALCIUM 20 MILLIGRAM(S): 80 TABLET, FILM COATED ORAL at 22:10

## 2021-01-01 RX ADMIN — HEPARIN SODIUM 5000 UNIT(S): 5000 INJECTION INTRAVENOUS; SUBCUTANEOUS at 05:49

## 2021-01-01 RX ADMIN — Medication 650 MILLIGRAM(S): at 23:00

## 2021-01-01 RX ADMIN — Medication 25 MILLIGRAM(S): at 17:56

## 2021-01-01 RX ADMIN — Medication 650 MILLIGRAM(S): at 07:07

## 2021-01-01 RX ADMIN — Medication 650 MILLIGRAM(S): at 14:32

## 2021-01-01 RX ADMIN — Medication 250 MILLIGRAM(S): at 12:32

## 2021-01-01 RX ADMIN — Medication 75 MICROGRAM(S): at 05:57

## 2021-01-01 RX ADMIN — AMLODIPINE BESYLATE 10 MILLIGRAM(S): 2.5 TABLET ORAL at 05:45

## 2021-01-01 RX ADMIN — HEPARIN SODIUM 5000 UNIT(S): 5000 INJECTION INTRAVENOUS; SUBCUTANEOUS at 17:45

## 2021-01-01 RX ADMIN — Medication 3 MILLILITER(S): at 11:25

## 2021-01-01 RX ADMIN — CEFEPIME 100 MILLIGRAM(S): 1 INJECTION, POWDER, FOR SOLUTION INTRAMUSCULAR; INTRAVENOUS at 21:10

## 2021-01-01 RX ADMIN — Medication 75 MICROGRAM(S): at 05:41

## 2021-01-01 RX ADMIN — Medication 3 MILLILITER(S): at 11:38

## 2021-01-01 RX ADMIN — CEFEPIME 100 MILLIGRAM(S): 1 INJECTION, POWDER, FOR SOLUTION INTRAMUSCULAR; INTRAVENOUS at 05:57

## 2021-01-01 RX ADMIN — Medication 75 MICROGRAM(S): at 06:32

## 2021-01-01 RX ADMIN — Medication 300 MILLIGRAM(S): at 04:48

## 2021-01-01 RX ADMIN — Medication 300 MILLIGRAM(S): at 10:33

## 2021-01-01 RX ADMIN — ATORVASTATIN CALCIUM 20 MILLIGRAM(S): 80 TABLET, FILM COATED ORAL at 22:44

## 2021-01-01 RX ADMIN — Medication 650 MILLIGRAM(S): at 00:25

## 2021-01-01 RX ADMIN — Medication 50 MILLIGRAM(S): at 17:26

## 2021-01-01 RX ADMIN — CEFEPIME 100 MILLIGRAM(S): 1 INJECTION, POWDER, FOR SOLUTION INTRAMUSCULAR; INTRAVENOUS at 22:13

## 2021-01-01 RX ADMIN — HEPARIN SODIUM 5000 UNIT(S): 5000 INJECTION INTRAVENOUS; SUBCUTANEOUS at 14:35

## 2021-01-01 RX ADMIN — LEVETIRACETAM 750 MILLIGRAM(S): 250 TABLET, FILM COATED ORAL at 06:52

## 2021-01-01 RX ADMIN — HEPARIN SODIUM 5000 UNIT(S): 5000 INJECTION INTRAVENOUS; SUBCUTANEOUS at 05:08

## 2021-01-01 RX ADMIN — ATORVASTATIN CALCIUM 20 MILLIGRAM(S): 80 TABLET, FILM COATED ORAL at 20:41

## 2021-01-01 RX ADMIN — LEVETIRACETAM 750 MILLIGRAM(S): 250 TABLET, FILM COATED ORAL at 17:57

## 2021-01-01 RX ADMIN — LEVETIRACETAM 750 MILLIGRAM(S): 250 TABLET, FILM COATED ORAL at 17:50

## 2021-01-01 RX ADMIN — HEPARIN SODIUM 5000 UNIT(S): 5000 INJECTION INTRAVENOUS; SUBCUTANEOUS at 05:40

## 2021-01-01 RX ADMIN — Medication 75 MICROGRAM(S): at 05:54

## 2021-01-01 RX ADMIN — CEFEPIME 100 MILLIGRAM(S): 1 INJECTION, POWDER, FOR SOLUTION INTRAMUSCULAR; INTRAVENOUS at 16:19

## 2021-01-01 RX ADMIN — Medication 3 MILLIGRAM(S): at 22:59

## 2021-01-01 RX ADMIN — Medication 3 MILLILITER(S): at 17:08

## 2021-01-01 RX ADMIN — Medication 3 MILLIGRAM(S): at 21:47

## 2021-01-01 RX ADMIN — AMLODIPINE BESYLATE 10 MILLIGRAM(S): 2.5 TABLET ORAL at 05:57

## 2021-01-01 RX ADMIN — Medication 650 MILLIGRAM(S): at 06:00

## 2021-01-01 RX ADMIN — AMLODIPINE BESYLATE 10 MILLIGRAM(S): 2.5 TABLET ORAL at 05:08

## 2021-01-01 RX ADMIN — HEPARIN SODIUM 5000 UNIT(S): 5000 INJECTION INTRAVENOUS; SUBCUTANEOUS at 15:11

## 2021-01-01 RX ADMIN — Medication 300 MILLIGRAM(S): at 05:25

## 2021-01-01 RX ADMIN — Medication 25 MILLIGRAM(S): at 17:07

## 2021-01-01 RX ADMIN — Medication 166.67 MILLIGRAM(S): at 17:43

## 2021-01-01 RX ADMIN — Medication 75 MICROGRAM(S): at 06:50

## 2021-01-01 RX ADMIN — Medication 3 MILLILITER(S): at 23:14

## 2021-01-01 RX ADMIN — Medication 3 MILLILITER(S): at 05:43

## 2021-01-01 RX ADMIN — Medication 100 MILLIGRAM(S): at 02:33

## 2021-01-01 RX ADMIN — Medication 650 MILLIGRAM(S): at 22:55

## 2021-01-01 RX ADMIN — Medication 40 MILLIGRAM(S): at 05:08

## 2021-01-01 RX ADMIN — Medication 1: at 00:03

## 2021-01-01 RX ADMIN — Medication 4: at 05:43

## 2021-01-01 RX ADMIN — Medication 3 MILLILITER(S): at 11:14

## 2021-01-01 RX ADMIN — LEVETIRACETAM 750 MILLIGRAM(S): 250 TABLET, FILM COATED ORAL at 06:15

## 2021-01-01 RX ADMIN — Medication 2: at 13:12

## 2021-01-01 RX ADMIN — Medication 100 MILLIGRAM(S): at 03:30

## 2021-01-01 RX ADMIN — Medication 650 MILLIGRAM(S): at 07:10

## 2021-01-01 RX ADMIN — Medication 1: at 06:11

## 2021-01-01 RX ADMIN — Medication 40 MILLIGRAM(S): at 17:54

## 2021-01-01 RX ADMIN — LEVETIRACETAM 750 MILLIGRAM(S): 250 TABLET, FILM COATED ORAL at 06:49

## 2021-01-01 RX ADMIN — Medication 650 MILLIGRAM(S): at 08:00

## 2021-01-01 RX ADMIN — HEPARIN SODIUM 5000 UNIT(S): 5000 INJECTION INTRAVENOUS; SUBCUTANEOUS at 07:01

## 2021-01-01 RX ADMIN — Medication 100 MILLIGRAM(S): at 13:49

## 2021-01-01 RX ADMIN — LEVETIRACETAM 750 MILLIGRAM(S): 250 TABLET, FILM COATED ORAL at 20:41

## 2021-01-01 RX ADMIN — CEFEPIME 100 MILLIGRAM(S): 1 INJECTION, POWDER, FOR SOLUTION INTRAMUSCULAR; INTRAVENOUS at 06:11

## 2021-01-01 RX ADMIN — HEPARIN SODIUM 5000 UNIT(S): 5000 INJECTION INTRAVENOUS; SUBCUTANEOUS at 13:51

## 2021-01-01 RX ADMIN — CHLORHEXIDINE GLUCONATE 1 APPLICATION(S): 213 SOLUTION TOPICAL at 00:00

## 2021-01-01 RX ADMIN — Medication 3 MILLILITER(S): at 17:15

## 2021-01-01 RX ADMIN — AMLODIPINE BESYLATE 10 MILLIGRAM(S): 2.5 TABLET ORAL at 06:09

## 2021-01-01 RX ADMIN — Medication 3: at 12:54

## 2021-01-01 RX ADMIN — Medication 300 MILLIGRAM(S): at 18:46

## 2021-01-01 RX ADMIN — PIPERACILLIN AND TAZOBACTAM 25 GRAM(S): 4; .5 INJECTION, POWDER, LYOPHILIZED, FOR SOLUTION INTRAVENOUS at 22:10

## 2021-01-01 RX ADMIN — Medication 1: at 06:34

## 2021-01-01 RX ADMIN — LEVETIRACETAM 750 MILLIGRAM(S): 250 TABLET, FILM COATED ORAL at 17:23

## 2021-01-01 RX ADMIN — SODIUM CHLORIDE 60 MILLILITER(S): 9 INJECTION, SOLUTION INTRAVENOUS at 01:22

## 2021-01-01 RX ADMIN — Medication 20 MILLIGRAM(S): at 17:06

## 2021-01-01 RX ADMIN — Medication 1: at 00:21

## 2021-01-01 RX ADMIN — Medication 2: at 00:16

## 2021-01-01 RX ADMIN — ERTAPENEM SODIUM 120 MILLIGRAM(S): 1 INJECTION, POWDER, LYOPHILIZED, FOR SOLUTION INTRAMUSCULAR; INTRAVENOUS at 15:10

## 2021-01-01 RX ADMIN — Medication 100 MILLIGRAM(S): at 14:34

## 2021-01-01 RX ADMIN — Medication 1: at 12:29

## 2021-01-01 RX ADMIN — Medication 2: at 12:33

## 2021-01-01 RX ADMIN — Medication 100 MILLIGRAM(S): at 09:39

## 2021-01-01 RX ADMIN — Medication 25 MILLIGRAM(S): at 05:41

## 2021-01-01 RX ADMIN — LEVETIRACETAM 750 MILLIGRAM(S): 250 TABLET, FILM COATED ORAL at 06:19

## 2021-01-01 RX ADMIN — AMLODIPINE BESYLATE 10 MILLIGRAM(S): 2.5 TABLET ORAL at 06:32

## 2021-01-01 RX ADMIN — Medication 75 MICROGRAM(S): at 05:45

## 2021-01-01 RX ADMIN — CEFTRIAXONE 100 MILLIGRAM(S): 500 INJECTION, POWDER, FOR SOLUTION INTRAMUSCULAR; INTRAVENOUS at 18:12

## 2021-01-01 RX ADMIN — Medication 1: at 12:53

## 2021-01-01 RX ADMIN — Medication 650 MILLIGRAM(S): at 16:00

## 2021-01-01 RX ADMIN — NYSTATIN CREAM 1 APPLICATION(S): 100000 CREAM TOPICAL at 17:55

## 2021-01-01 RX ADMIN — LEVETIRACETAM 750 MILLIGRAM(S): 250 TABLET, FILM COATED ORAL at 18:03

## 2021-01-01 RX ADMIN — Medication 1 DROP(S): at 17:07

## 2021-01-01 RX ADMIN — HEPARIN SODIUM 5000 UNIT(S): 5000 INJECTION INTRAVENOUS; SUBCUTANEOUS at 22:02

## 2021-01-01 RX ADMIN — NYSTATIN CREAM 1 APPLICATION(S): 100000 CREAM TOPICAL at 05:56

## 2021-01-01 RX ADMIN — Medication 100 MILLIGRAM(S): at 01:22

## 2021-01-01 RX ADMIN — Medication 3: at 18:21

## 2021-01-01 RX ADMIN — Medication 650 MILLIGRAM(S): at 23:45

## 2021-01-01 RX ADMIN — Medication 1 DROP(S): at 17:23

## 2021-01-01 RX ADMIN — Medication 100 MILLIGRAM(S): at 13:13

## 2021-01-01 RX ADMIN — Medication 3 MILLILITER(S): at 12:52

## 2021-01-01 RX ADMIN — CEFEPIME 100 MILLIGRAM(S): 1 INJECTION, POWDER, FOR SOLUTION INTRAMUSCULAR; INTRAVENOUS at 13:25

## 2021-01-01 RX ADMIN — Medication 650 MILLIGRAM(S): at 06:52

## 2021-01-01 RX ADMIN — Medication 1 DROP(S): at 06:33

## 2021-01-01 RX ADMIN — Medication 3 MILLILITER(S): at 05:50

## 2021-01-01 RX ADMIN — Medication 650 MILLIGRAM(S): at 22:11

## 2021-01-01 RX ADMIN — Medication 650 MILLIGRAM(S): at 22:32

## 2021-01-01 RX ADMIN — Medication 3 MILLILITER(S): at 17:39

## 2021-01-01 RX ADMIN — Medication 75 MICROGRAM(S): at 06:52

## 2021-01-01 RX ADMIN — ATORVASTATIN CALCIUM 20 MILLIGRAM(S): 80 TABLET, FILM COATED ORAL at 22:11

## 2021-01-01 RX ADMIN — Medication 1: at 18:03

## 2021-01-01 RX ADMIN — Medication 3 MILLILITER(S): at 17:38

## 2021-01-01 RX ADMIN — Medication 50 MILLIGRAM(S): at 18:23

## 2021-01-01 RX ADMIN — SODIUM ZIRCONIUM CYCLOSILICATE 5 GRAM(S): 10 POWDER, FOR SUSPENSION ORAL at 15:33

## 2021-01-01 RX ADMIN — Medication 3 MILLILITER(S): at 23:02

## 2021-01-01 RX ADMIN — PIPERACILLIN AND TAZOBACTAM 25 GRAM(S): 4; .5 INJECTION, POWDER, LYOPHILIZED, FOR SOLUTION INTRAVENOUS at 05:08

## 2021-01-01 RX ADMIN — Medication 300 MILLIGRAM(S): at 07:04

## 2021-01-01 RX ADMIN — Medication 650 MILLIGRAM(S): at 11:31

## 2021-01-01 RX ADMIN — SODIUM CHLORIDE 75 MILLILITER(S): 9 INJECTION INTRAMUSCULAR; INTRAVENOUS; SUBCUTANEOUS at 10:53

## 2021-01-01 RX ADMIN — Medication 3 MILLILITER(S): at 17:24

## 2021-01-01 RX ADMIN — Medication 3 MILLILITER(S): at 05:35

## 2021-01-01 RX ADMIN — Medication 3: at 00:16

## 2021-01-01 RX ADMIN — Medication 100 MILLIGRAM(S): at 12:51

## 2021-01-01 RX ADMIN — Medication 1: at 06:41

## 2021-01-01 RX ADMIN — Medication 3 MILLILITER(S): at 05:20

## 2021-01-01 RX ADMIN — Medication 1 DROP(S): at 06:19

## 2021-01-01 RX ADMIN — Medication 3 MILLIGRAM(S): at 22:12

## 2021-01-01 RX ADMIN — Medication 3 MILLILITER(S): at 11:32

## 2021-01-01 RX ADMIN — Medication 1 DROP(S): at 05:41

## 2021-01-01 RX ADMIN — PIPERACILLIN AND TAZOBACTAM 25 GRAM(S): 4; .5 INJECTION, POWDER, LYOPHILIZED, FOR SOLUTION INTRAVENOUS at 21:07

## 2021-01-01 RX ADMIN — Medication 166.67 MILLIGRAM(S): at 09:33

## 2021-01-01 RX ADMIN — SODIUM CHLORIDE 60 MILLILITER(S): 9 INJECTION, SOLUTION INTRAVENOUS at 06:10

## 2021-01-01 RX ADMIN — Medication 1: at 14:07

## 2021-01-01 RX ADMIN — Medication 3 MILLILITER(S): at 17:22

## 2021-01-01 RX ADMIN — HEPARIN SODIUM 5000 UNIT(S): 5000 INJECTION INTRAVENOUS; SUBCUTANEOUS at 17:14

## 2021-01-01 RX ADMIN — Medication 25 MILLIGRAM(S): at 17:55

## 2021-01-01 RX ADMIN — AMLODIPINE BESYLATE 10 MILLIGRAM(S): 2.5 TABLET ORAL at 06:54

## 2021-01-01 RX ADMIN — Medication 40 MILLIEQUIVALENT(S): at 14:32

## 2021-01-01 RX ADMIN — Medication 650 MILLIGRAM(S): at 19:10

## 2021-01-01 RX ADMIN — PIPERACILLIN AND TAZOBACTAM 200 GRAM(S): 4; .5 INJECTION, POWDER, LYOPHILIZED, FOR SOLUTION INTRAVENOUS at 08:49

## 2021-01-01 RX ADMIN — Medication 25 MILLIGRAM(S): at 05:08

## 2021-01-01 RX ADMIN — Medication 1: at 01:18

## 2021-01-01 RX ADMIN — Medication 3 MILLILITER(S): at 10:42

## 2021-01-01 RX ADMIN — Medication 3 MILLILITER(S): at 00:15

## 2021-01-01 RX ADMIN — Medication 50 MILLIGRAM(S): at 17:49

## 2021-01-01 RX ADMIN — Medication 650 MILLIGRAM(S): at 13:30

## 2021-01-01 RX ADMIN — Medication 3 MILLILITER(S): at 11:07

## 2021-01-01 RX ADMIN — Medication 1: at 12:14

## 2021-01-01 RX ADMIN — Medication 1 DROP(S): at 17:14

## 2021-01-01 RX ADMIN — AMLODIPINE BESYLATE 10 MILLIGRAM(S): 2.5 TABLET ORAL at 06:51

## 2021-01-01 RX ADMIN — AMLODIPINE BESYLATE 10 MILLIGRAM(S): 2.5 TABLET ORAL at 06:10

## 2021-01-01 RX ADMIN — Medication 25 MILLIGRAM(S): at 06:09

## 2021-01-01 RX ADMIN — ATORVASTATIN CALCIUM 20 MILLIGRAM(S): 80 TABLET, FILM COATED ORAL at 22:31

## 2021-01-01 RX ADMIN — PIPERACILLIN AND TAZOBACTAM 25 GRAM(S): 4; .5 INJECTION, POWDER, LYOPHILIZED, FOR SOLUTION INTRAVENOUS at 06:34

## 2021-01-01 RX ADMIN — HEPARIN SODIUM 5000 UNIT(S): 5000 INJECTION INTRAVENOUS; SUBCUTANEOUS at 22:10

## 2021-01-01 RX ADMIN — CEFEPIME 100 MILLIGRAM(S): 1 INJECTION, POWDER, FOR SOLUTION INTRAMUSCULAR; INTRAVENOUS at 06:54

## 2021-01-01 RX ADMIN — LEVETIRACETAM 400 MILLIGRAM(S): 250 TABLET, FILM COATED ORAL at 11:27

## 2021-01-01 RX ADMIN — Medication 3 MILLIGRAM(S): at 22:54

## 2021-01-01 RX ADMIN — LEVETIRACETAM 750 MILLIGRAM(S): 250 TABLET, FILM COATED ORAL at 18:23

## 2021-01-01 RX ADMIN — Medication 100 MILLIGRAM(S): at 12:13

## 2021-01-01 RX ADMIN — ATORVASTATIN CALCIUM 20 MILLIGRAM(S): 80 TABLET, FILM COATED ORAL at 22:54

## 2021-01-01 RX ADMIN — ERTAPENEM SODIUM 120 MILLIGRAM(S): 1 INJECTION, POWDER, LYOPHILIZED, FOR SOLUTION INTRAMUSCULAR; INTRAVENOUS at 13:51

## 2021-01-01 RX ADMIN — ONDANSETRON 4 MILLIGRAM(S): 8 TABLET, FILM COATED ORAL at 22:57

## 2021-01-01 RX ADMIN — Medication 650 MILLIGRAM(S): at 00:01

## 2021-01-01 RX ADMIN — ATORVASTATIN CALCIUM 20 MILLIGRAM(S): 80 TABLET, FILM COATED ORAL at 22:28

## 2021-01-01 RX ADMIN — Medication 650 MILLIGRAM(S): at 15:30

## 2021-01-01 RX ADMIN — AMLODIPINE BESYLATE 10 MILLIGRAM(S): 2.5 TABLET ORAL at 06:19

## 2021-01-01 RX ADMIN — Medication 100 MILLIGRAM(S): at 13:24

## 2021-01-01 RX ADMIN — Medication 1: at 18:01

## 2021-01-01 RX ADMIN — Medication 50 MILLIGRAM(S): at 17:59

## 2021-01-01 RX ADMIN — ATORVASTATIN CALCIUM 20 MILLIGRAM(S): 80 TABLET, FILM COATED ORAL at 21:07

## 2021-01-01 RX ADMIN — Medication 25 MILLIGRAM(S): at 17:34

## 2021-01-01 RX ADMIN — Medication 1 DROP(S): at 18:02

## 2021-01-01 RX ADMIN — Medication 650 MILLIGRAM(S): at 23:55

## 2021-01-01 RX ADMIN — Medication 100 MILLIGRAM(S): at 14:40

## 2021-01-01 RX ADMIN — LEVETIRACETAM 750 MILLIGRAM(S): 250 TABLET, FILM COATED ORAL at 17:55

## 2021-01-01 RX ADMIN — LEVETIRACETAM 750 MILLIGRAM(S): 250 TABLET, FILM COATED ORAL at 18:04

## 2021-01-01 RX ADMIN — LEVETIRACETAM 750 MILLIGRAM(S): 250 TABLET, FILM COATED ORAL at 06:32

## 2021-01-01 RX ADMIN — Medication 3 MILLILITER(S): at 11:21

## 2021-01-01 RX ADMIN — CEFEPIME 100 MILLIGRAM(S): 1 INJECTION, POWDER, FOR SOLUTION INTRAMUSCULAR; INTRAVENOUS at 22:54

## 2021-01-01 RX ADMIN — LEVETIRACETAM 750 MILLIGRAM(S): 250 TABLET, FILM COATED ORAL at 06:10

## 2021-01-01 RX ADMIN — HEPARIN SODIUM 5000 UNIT(S): 5000 INJECTION INTRAVENOUS; SUBCUTANEOUS at 20:41

## 2021-01-01 RX ADMIN — Medication 100 MILLIGRAM(S): at 12:48

## 2021-01-01 RX ADMIN — Medication 100 MILLIGRAM(S): at 12:54

## 2021-01-01 RX ADMIN — Medication 1: at 06:14

## 2021-01-01 RX ADMIN — CEFEPIME 100 MILLIGRAM(S): 1 INJECTION, POWDER, FOR SOLUTION INTRAMUSCULAR; INTRAVENOUS at 22:44

## 2021-01-01 RX ADMIN — Medication 3 MILLILITER(S): at 00:33

## 2021-01-01 RX ADMIN — Medication 25 MILLIGRAM(S): at 17:45

## 2021-01-01 RX ADMIN — HEPARIN SODIUM 5000 UNIT(S): 5000 INJECTION INTRAVENOUS; SUBCUTANEOUS at 22:32

## 2021-01-01 RX ADMIN — NYSTATIN CREAM 1 APPLICATION(S): 100000 CREAM TOPICAL at 18:49

## 2021-01-01 RX ADMIN — Medication 2: at 18:43

## 2021-01-01 RX ADMIN — Medication 1 DROP(S): at 06:09

## 2021-01-01 RX ADMIN — Medication 100 MILLIGRAM(S): at 03:02

## 2021-01-01 RX ADMIN — Medication 1: at 00:30

## 2021-01-01 RX ADMIN — CEFEPIME 100 MILLIGRAM(S): 1 INJECTION, POWDER, FOR SOLUTION INTRAMUSCULAR; INTRAVENOUS at 05:56

## 2021-01-01 RX ADMIN — HYDROMORPHONE HYDROCHLORIDE 0.25 MILLIGRAM(S): 2 INJECTION INTRAMUSCULAR; INTRAVENOUS; SUBCUTANEOUS at 08:45

## 2021-01-01 RX ADMIN — HEPARIN SODIUM 5000 UNIT(S): 5000 INJECTION INTRAVENOUS; SUBCUTANEOUS at 05:13

## 2021-01-01 RX ADMIN — ATORVASTATIN CALCIUM 20 MILLIGRAM(S): 80 TABLET, FILM COATED ORAL at 22:36

## 2021-01-01 RX ADMIN — LEVETIRACETAM 400 MILLIGRAM(S): 250 TABLET, FILM COATED ORAL at 21:48

## 2021-01-01 RX ADMIN — Medication 2: at 06:47

## 2021-01-01 RX ADMIN — Medication 1: at 12:18

## 2021-01-01 RX ADMIN — CEFEPIME 100 MILLIGRAM(S): 1 INJECTION, POWDER, FOR SOLUTION INTRAMUSCULAR; INTRAVENOUS at 14:40

## 2021-01-01 RX ADMIN — HEPARIN SODIUM 5000 UNIT(S): 5000 INJECTION INTRAVENOUS; SUBCUTANEOUS at 22:56

## 2021-01-01 RX ADMIN — Medication 3 MILLILITER(S): at 23:09

## 2021-01-01 RX ADMIN — Medication 50 MILLIGRAM(S): at 06:33

## 2021-01-01 RX ADMIN — Medication 1: at 17:30

## 2021-01-01 RX ADMIN — Medication 75 MICROGRAM(S): at 06:12

## 2021-01-01 RX ADMIN — Medication 3 MILLILITER(S): at 17:06

## 2021-01-01 RX ADMIN — SODIUM CHLORIDE 60 MILLILITER(S): 9 INJECTION, SOLUTION INTRAVENOUS at 22:59

## 2021-01-01 RX ADMIN — HEPARIN SODIUM 5000 UNIT(S): 5000 INJECTION INTRAVENOUS; SUBCUTANEOUS at 06:51

## 2021-01-01 RX ADMIN — Medication 50 MILLIGRAM(S): at 06:13

## 2021-01-01 RX ADMIN — NYSTATIN CREAM 1 APPLICATION(S): 100000 CREAM TOPICAL at 18:23

## 2021-01-01 RX ADMIN — Medication 1: at 06:49

## 2021-01-01 RX ADMIN — Medication 125 MILLIGRAM(S): at 08:49

## 2021-01-01 RX ADMIN — Medication 1 DROP(S): at 05:58

## 2021-01-01 RX ADMIN — PIPERACILLIN AND TAZOBACTAM 25 GRAM(S): 4; .5 INJECTION, POWDER, LYOPHILIZED, FOR SOLUTION INTRAVENOUS at 13:26

## 2021-01-01 RX ADMIN — AZITHROMYCIN 250 MILLIGRAM(S): 500 TABLET, FILM COATED ORAL at 22:32

## 2021-01-01 RX ADMIN — Medication 25 MILLIGRAM(S): at 06:49

## 2021-01-01 RX ADMIN — Medication 100 MILLIGRAM(S): at 13:07

## 2021-01-01 RX ADMIN — Medication 100 MILLIGRAM(S): at 13:04

## 2021-01-01 RX ADMIN — Medication 40 MILLIGRAM(S): at 22:33

## 2021-01-01 RX ADMIN — Medication 3 MILLILITER(S): at 17:19

## 2021-01-01 RX ADMIN — SODIUM CHLORIDE 1000 MILLILITER(S): 9 INJECTION INTRAMUSCULAR; INTRAVENOUS; SUBCUTANEOUS at 10:35

## 2021-01-01 RX ADMIN — NYSTATIN CREAM 1 APPLICATION(S): 100000 CREAM TOPICAL at 05:45

## 2021-01-01 RX ADMIN — Medication 650 MILLIGRAM(S): at 00:29

## 2021-01-01 RX ADMIN — Medication 3 MILLIGRAM(S): at 20:43

## 2021-01-01 RX ADMIN — Medication 3 MILLILITER(S): at 05:08

## 2021-01-01 RX ADMIN — Medication 1: at 06:27

## 2021-01-01 RX ADMIN — Medication 650 MILLIGRAM(S): at 21:38

## 2021-01-01 RX ADMIN — CEFEPIME 100 MILLIGRAM(S): 1 INJECTION, POWDER, FOR SOLUTION INTRAMUSCULAR; INTRAVENOUS at 06:35

## 2021-01-01 RX ADMIN — HEPARIN SODIUM 5000 UNIT(S): 5000 INJECTION INTRAVENOUS; SUBCUTANEOUS at 06:11

## 2021-01-01 RX ADMIN — Medication 3 MILLILITER(S): at 05:27

## 2021-01-01 RX ADMIN — HEPARIN SODIUM 5000 UNIT(S): 5000 INJECTION INTRAVENOUS; SUBCUTANEOUS at 15:19

## 2021-01-01 RX ADMIN — HEPARIN SODIUM 5000 UNIT(S): 5000 INJECTION INTRAVENOUS; SUBCUTANEOUS at 05:57

## 2021-01-01 RX ADMIN — Medication 100 MILLIGRAM(S): at 11:31

## 2021-01-01 RX ADMIN — LEVETIRACETAM 400 MILLIGRAM(S): 250 TABLET, FILM COATED ORAL at 01:03

## 2021-01-01 RX ADMIN — Medication 3 MILLIGRAM(S): at 22:10

## 2021-01-01 RX ADMIN — CEFEPIME 100 MILLIGRAM(S): 1 INJECTION, POWDER, FOR SOLUTION INTRAMUSCULAR; INTRAVENOUS at 05:41

## 2021-01-01 RX ADMIN — Medication 40 MILLIGRAM(S): at 05:13

## 2021-01-01 RX ADMIN — Medication 1: at 12:52

## 2021-01-01 RX ADMIN — PIPERACILLIN AND TAZOBACTAM 25 GRAM(S): 4; .5 INJECTION, POWDER, LYOPHILIZED, FOR SOLUTION INTRAVENOUS at 14:40

## 2021-01-01 RX ADMIN — Medication 650 MILLIGRAM(S): at 12:45

## 2021-01-01 RX ADMIN — Medication 3: at 06:43

## 2021-01-01 RX ADMIN — AZITHROMYCIN 250 MILLIGRAM(S): 500 TABLET, FILM COATED ORAL at 21:47

## 2021-01-01 RX ADMIN — ATORVASTATIN CALCIUM 20 MILLIGRAM(S): 80 TABLET, FILM COATED ORAL at 21:09

## 2021-01-01 RX ADMIN — Medication 1 DROP(S): at 17:55

## 2021-01-01 RX ADMIN — CEFEPIME 100 MILLIGRAM(S): 1 INJECTION, POWDER, FOR SOLUTION INTRAMUSCULAR; INTRAVENOUS at 16:16

## 2021-01-01 RX ADMIN — Medication 650 MILLIGRAM(S): at 21:08

## 2021-01-01 RX ADMIN — Medication 3 MILLIGRAM(S): at 21:07

## 2021-01-01 RX ADMIN — Medication 3 MILLILITER(S): at 00:19

## 2021-01-01 RX ADMIN — ATORVASTATIN CALCIUM 20 MILLIGRAM(S): 80 TABLET, FILM COATED ORAL at 21:47

## 2021-01-01 RX ADMIN — Medication 3 MILLILITER(S): at 23:26

## 2021-01-01 RX ADMIN — HEPARIN SODIUM 5000 UNIT(S): 5000 INJECTION INTRAVENOUS; SUBCUTANEOUS at 15:20

## 2021-01-01 RX ADMIN — LEVETIRACETAM 750 MILLIGRAM(S): 250 TABLET, FILM COATED ORAL at 17:33

## 2021-01-01 RX ADMIN — Medication 50 MILLIGRAM(S): at 06:11

## 2021-01-01 RX ADMIN — Medication 650 MILLIGRAM(S): at 22:10

## 2021-01-01 RX ADMIN — Medication 3 MILLILITER(S): at 05:55

## 2021-01-01 RX ADMIN — LEVETIRACETAM 750 MILLIGRAM(S): 250 TABLET, FILM COATED ORAL at 05:46

## 2021-01-01 RX ADMIN — Medication 25 MILLIGRAM(S): at 06:52

## 2021-01-01 RX ADMIN — NYSTATIN CREAM 1 APPLICATION(S): 100000 CREAM TOPICAL at 06:13

## 2021-01-01 RX ADMIN — Medication 100 MILLIGRAM(S): at 14:14

## 2021-01-01 RX ADMIN — CEFEPIME 100 MILLIGRAM(S): 1 INJECTION, POWDER, FOR SOLUTION INTRAMUSCULAR; INTRAVENOUS at 14:34

## 2021-01-01 RX ADMIN — Medication 1: at 01:06

## 2021-01-01 RX ADMIN — Medication 300 MILLIGRAM(S): at 09:36

## 2021-01-01 RX ADMIN — CEFEPIME 100 MILLIGRAM(S): 1 INJECTION, POWDER, FOR SOLUTION INTRAMUSCULAR; INTRAVENOUS at 14:14

## 2021-01-01 RX ADMIN — PIPERACILLIN AND TAZOBACTAM 200 GRAM(S): 4; .5 INJECTION, POWDER, LYOPHILIZED, FOR SOLUTION INTRAVENOUS at 10:53

## 2021-01-01 RX ADMIN — Medication 1: at 17:25

## 2021-01-01 RX ADMIN — Medication 2: at 01:23

## 2021-01-01 RX ADMIN — HEPARIN SODIUM 5000 UNIT(S): 5000 INJECTION INTRAVENOUS; SUBCUTANEOUS at 13:25

## 2021-01-01 RX ADMIN — HEPARIN SODIUM 5000 UNIT(S): 5000 INJECTION INTRAVENOUS; SUBCUTANEOUS at 06:33

## 2021-01-01 RX ADMIN — Medication 1: at 12:26

## 2021-01-01 RX ADMIN — HEPARIN SODIUM 5000 UNIT(S): 5000 INJECTION INTRAVENOUS; SUBCUTANEOUS at 18:13

## 2021-01-01 RX ADMIN — Medication 1 DROP(S): at 18:22

## 2021-01-01 RX ADMIN — Medication 1: at 06:29

## 2021-01-01 RX ADMIN — Medication 50 MILLIGRAM(S): at 05:49

## 2021-01-01 RX ADMIN — Medication 1 DROP(S): at 06:53

## 2021-01-01 RX ADMIN — Medication 1 DROP(S): at 06:51

## 2021-01-01 RX ADMIN — LEVETIRACETAM 750 MILLIGRAM(S): 250 TABLET, FILM COATED ORAL at 17:06

## 2021-01-01 RX ADMIN — Medication 3 MILLILITER(S): at 17:12

## 2021-01-01 RX ADMIN — CEFEPIME 100 MILLIGRAM(S): 1 INJECTION, POWDER, FOR SOLUTION INTRAMUSCULAR; INTRAVENOUS at 07:47

## 2021-01-01 RX ADMIN — Medication 1 DROP(S): at 17:56

## 2021-01-01 RX ADMIN — HEPARIN SODIUM 5000 UNIT(S): 5000 INJECTION INTRAVENOUS; SUBCUTANEOUS at 15:28

## 2021-01-01 RX ADMIN — Medication 3 MILLILITER(S): at 23:49

## 2021-01-01 RX ADMIN — LEVETIRACETAM 750 MILLIGRAM(S): 250 TABLET, FILM COATED ORAL at 17:24

## 2021-01-01 RX ADMIN — Medication 100 MILLIGRAM(S): at 02:05

## 2021-01-01 RX ADMIN — Medication 3 MILLIGRAM(S): at 22:29

## 2021-01-01 RX ADMIN — ONDANSETRON 4 MILLIGRAM(S): 8 TABLET, FILM COATED ORAL at 09:26

## 2021-01-01 RX ADMIN — AMLODIPINE BESYLATE 10 MILLIGRAM(S): 2.5 TABLET ORAL at 05:41

## 2021-01-01 RX ADMIN — CEFEPIME 100 MILLIGRAM(S): 1 INJECTION, POWDER, FOR SOLUTION INTRAMUSCULAR; INTRAVENOUS at 22:03

## 2021-01-01 RX ADMIN — LEVETIRACETAM 750 MILLIGRAM(S): 250 TABLET, FILM COATED ORAL at 05:45

## 2021-01-01 RX ADMIN — HEPARIN SODIUM 5000 UNIT(S): 5000 INJECTION INTRAVENOUS; SUBCUTANEOUS at 21:09

## 2021-01-01 RX ADMIN — HEPARIN SODIUM 5000 UNIT(S): 5000 INJECTION INTRAVENOUS; SUBCUTANEOUS at 22:36

## 2021-01-01 RX ADMIN — PIPERACILLIN AND TAZOBACTAM 25 GRAM(S): 4; .5 INJECTION, POWDER, LYOPHILIZED, FOR SOLUTION INTRAVENOUS at 13:13

## 2021-01-01 RX ADMIN — Medication 3 MILLILITER(S): at 23:25

## 2021-01-01 RX ADMIN — Medication 1 DROP(S): at 06:10

## 2021-01-01 RX ADMIN — Medication 3 MILLILITER(S): at 20:58

## 2021-01-01 RX ADMIN — Medication 75 MICROGRAM(S): at 05:08

## 2021-01-01 RX ADMIN — Medication 1 DROP(S): at 05:45

## 2021-01-01 RX ADMIN — Medication 1 DROP(S): at 05:55

## 2021-01-01 RX ADMIN — PIPERACILLIN AND TAZOBACTAM 25 GRAM(S): 4; .5 INJECTION, POWDER, LYOPHILIZED, FOR SOLUTION INTRAVENOUS at 06:19

## 2021-01-01 RX ADMIN — Medication 100 MILLIGRAM(S): at 15:12

## 2021-01-01 RX ADMIN — HEPARIN SODIUM 5000 UNIT(S): 5000 INJECTION INTRAVENOUS; SUBCUTANEOUS at 05:55

## 2021-01-01 RX ADMIN — Medication 75 MICROGRAM(S): at 06:18

## 2021-01-01 RX ADMIN — Medication 40 MILLIGRAM(S): at 13:26

## 2021-01-01 RX ADMIN — Medication 4: at 17:46

## 2021-01-01 RX ADMIN — AMLODIPINE BESYLATE 10 MILLIGRAM(S): 2.5 TABLET ORAL at 05:54

## 2021-01-01 RX ADMIN — Medication 3 MILLILITER(S): at 23:39

## 2021-01-01 RX ADMIN — Medication 1 DROP(S): at 17:40

## 2021-01-01 RX ADMIN — CEFEPIME 100 MILLIGRAM(S): 1 INJECTION, POWDER, FOR SOLUTION INTRAMUSCULAR; INTRAVENOUS at 22:36

## 2021-01-01 RX ADMIN — Medication 3 MILLILITER(S): at 05:01

## 2021-01-01 RX ADMIN — Medication 100 MILLIGRAM(S): at 14:08

## 2021-01-01 RX ADMIN — HYDROMORPHONE HYDROCHLORIDE 0.25 MILLIGRAM(S): 2 INJECTION INTRAMUSCULAR; INTRAVENOUS; SUBCUTANEOUS at 05:08

## 2021-01-01 RX ADMIN — HEPARIN SODIUM 5000 UNIT(S): 5000 INJECTION INTRAVENOUS; SUBCUTANEOUS at 06:19

## 2021-01-01 RX ADMIN — Medication 100 MILLIGRAM(S): at 02:09

## 2021-01-01 RX ADMIN — Medication 650 MILLIGRAM(S): at 18:55

## 2021-01-01 RX ADMIN — Medication 650 MILLIGRAM(S): at 12:54

## 2021-01-01 RX ADMIN — Medication 1: at 17:58

## 2021-01-01 RX ADMIN — Medication 50 MILLIGRAM(S): at 05:54

## 2021-01-01 RX ADMIN — LEVETIRACETAM 750 MILLIGRAM(S): 250 TABLET, FILM COATED ORAL at 05:54

## 2021-01-01 RX ADMIN — Medication 400 MILLIGRAM(S): at 08:46

## 2021-01-01 RX ADMIN — CEFEPIME 100 MILLIGRAM(S): 1 INJECTION, POWDER, FOR SOLUTION INTRAMUSCULAR; INTRAVENOUS at 13:49

## 2021-01-01 RX ADMIN — Medication 3 MILLILITER(S): at 05:56

## 2021-01-01 RX ADMIN — ONDANSETRON 4 MILLIGRAM(S): 8 TABLET, FILM COATED ORAL at 09:32

## 2021-01-01 RX ADMIN — CEFEPIME 100 MILLIGRAM(S): 1 INJECTION, POWDER, FOR SOLUTION INTRAMUSCULAR; INTRAVENOUS at 22:43

## 2021-01-01 RX ADMIN — Medication 75 MICROGRAM(S): at 06:09

## 2021-01-01 RX ADMIN — Medication 3 MILLILITER(S): at 05:51

## 2021-01-01 RX ADMIN — Medication 650 MILLIGRAM(S): at 01:30

## 2021-01-20 ENCOUNTER — RESULT REVIEW (OUTPATIENT)
Age: 86
End: 2021-01-20

## 2021-02-25 ENCOUNTER — RESULT REVIEW (OUTPATIENT)
Age: 86
End: 2021-02-25

## 2021-04-11 ENCOUNTER — RESULT REVIEW (OUTPATIENT)
Age: 86
End: 2021-04-11

## 2021-05-27 ENCOUNTER — INPATIENT (INPATIENT)
Facility: HOSPITAL | Age: 86
LOS: 4 days | Discharge: NURSING, REHAB OR LONG-TERM CARE | DRG: 190 | End: 2021-06-01
Attending: STUDENT IN AN ORGANIZED HEALTH CARE EDUCATION/TRAINING PROGRAM | Admitting: HOSPITALIST
Payer: COMMERCIAL

## 2021-05-27 VITALS
RESPIRATION RATE: 16 BRPM | OXYGEN SATURATION: 97 % | DIASTOLIC BLOOD PRESSURE: 58 MMHG | SYSTOLIC BLOOD PRESSURE: 115 MMHG | WEIGHT: 250 LBS | TEMPERATURE: 98 F | HEIGHT: 64 IN | HEART RATE: 64 BPM

## 2021-05-27 LAB — GAS PNL BLDV: SIGNIFICANT CHANGE UP

## 2021-05-27 PROCEDURE — 93010 ELECTROCARDIOGRAM REPORT: CPT

## 2021-05-27 PROCEDURE — 99285 EMERGENCY DEPT VISIT HI MDM: CPT

## 2021-05-27 PROCEDURE — 71045 X-RAY EXAM CHEST 1 VIEW: CPT | Mod: 26

## 2021-05-27 RX ORDER — IPRATROPIUM/ALBUTEROL SULFATE 18-103MCG
3 AEROSOL WITH ADAPTER (GRAM) INHALATION ONCE
Refills: 0 | Status: COMPLETED | OUTPATIENT
Start: 2021-05-27 | End: 2021-05-27

## 2021-05-27 RX ADMIN — Medication 3 MILLILITER(S): at 23:37

## 2021-05-27 NOTE — ED PROVIDER NOTE - ATTENDING CONTRIBUTION TO CARE
Afebrile. Awake and Alert. Lungs CTA. Heart RRR. Abdomen soft NTND. CN II-XII grossly intact. Moves all extremities without lateralization.    Sudden onset dyspnea  r/o COPD  r/o aspiration PNA  r/o ACS Afebrile. Awake and Alert. Lungs decreased air movement ins/exp wheezing. Heart RRR. Abdomen soft NTND. CN II-XII grossly intact. Moves all extremities without lateralization.    Sudden onset dyspnea  r/o COPD  r/o aspiration PNA  r/o ACS  r/o PE

## 2021-05-27 NOTE — ED PROVIDER NOTE - OBJECTIVE STATEMENT
86 yo F PMHx CVA with residual left weakness, PEG for dysphagia, hypothyroid, COPD, HTN, gout, presents to the ED for acute onset left CP and SOB, pleuritic in nature, about 3 hours prior to arrival. Pain does not radiate. Denies diaphoresis. Pt states she is chronically sob but is worse now. she is immobile and bed bound. she denies fever, vomiting, abd pain, diarrhea, leg swelling.

## 2021-05-27 NOTE — ED PROVIDER NOTE - PROGRESS NOTE DETAILS
Improved air movement after Duonebs. On 4L SYED WREN. Kaz Dobson MD. pt imrpvoed. ct noted : no ndiagnostic due to extrav; pt has redness there but soft compartments, good distal pulses. pt admitted

## 2021-05-27 NOTE — ED PROVIDER NOTE - CLINICAL SUMMARY MEDICAL DECISION MAKING FREE TEXT BOX
Kaz Dobson MD. 85 F CVA, c/b L sided weakness, PEG for dysphagia, hypothyroid, HTN, COPD, presents to the ED for acute onset SOB and left CP. the pain does not radiate. CP is pleuritic in nature. pt sob. is on 4L NC (unclear if that is her home O2), not moving air well, exp wheeze. concern for asp pna, acs, copd exacerbation, immobile thus PE is also considered. will get cxr, labs, duonebs, steroids, low threshold for bipap.

## 2021-05-27 NOTE — ED PROVIDER NOTE - PHYSICAL EXAMINATION
PHYSICAL EXAM:  GENERAL: non-toxic appearing; in no respiratory distress; morbidly obese; tachypneic; retracting;   HEAD: Atraumatic, Normocephalic  NECK: No JVD; FROM  EYES: PERRL, EOMs intact b/l w/out deficits; normal conjunctiva  CHEST/LUNG: not moving air well, decreased BS diffusely, expiratory wheeze  HEART: RRR no murmur/gallops/rubs  ABDOMEN: +BS, soft, NT, ND; PEG in place;   EXTREMITIES: No LE edema, +2 radial pulses b/l, +2 DP/PT pulses b/l  MUSCULOSKELETAL: FROM of all 4 extremities  NERVOUS SYSTEM:  A&Ox3, old left sided contracture and deficits   SKIN:  No new rashes

## 2021-05-28 DIAGNOSIS — I10 ESSENTIAL (PRIMARY) HYPERTENSION: ICD-10-CM

## 2021-05-28 DIAGNOSIS — E03.9 HYPOTHYROIDISM, UNSPECIFIED: ICD-10-CM

## 2021-05-28 DIAGNOSIS — R06.02 SHORTNESS OF BREATH: ICD-10-CM

## 2021-05-28 DIAGNOSIS — Z02.9 ENCOUNTER FOR ADMINISTRATIVE EXAMINATIONS, UNSPECIFIED: ICD-10-CM

## 2021-05-28 DIAGNOSIS — Z29.9 ENCOUNTER FOR PROPHYLACTIC MEASURES, UNSPECIFIED: ICD-10-CM

## 2021-05-28 DIAGNOSIS — I63.9 CEREBRAL INFARCTION, UNSPECIFIED: ICD-10-CM

## 2021-05-28 DIAGNOSIS — E78.5 HYPERLIPIDEMIA, UNSPECIFIED: ICD-10-CM

## 2021-05-28 DIAGNOSIS — J44.1 CHRONIC OBSTRUCTIVE PULMONARY DISEASE WITH (ACUTE) EXACERBATION: ICD-10-CM

## 2021-05-28 DIAGNOSIS — M10.9 GOUT, UNSPECIFIED: ICD-10-CM

## 2021-05-28 LAB
ALBUMIN SERPL ELPH-MCNC: 3.5 G/DL — SIGNIFICANT CHANGE UP (ref 3.3–5)
ALP SERPL-CCNC: 85 U/L — SIGNIFICANT CHANGE UP (ref 40–120)
ALT FLD-CCNC: 18 U/L — SIGNIFICANT CHANGE UP (ref 10–45)
ANION GAP SERPL CALC-SCNC: 13 MMOL/L — SIGNIFICANT CHANGE UP (ref 5–17)
ANION GAP SERPL CALC-SCNC: 15 MMOL/L — SIGNIFICANT CHANGE UP (ref 5–17)
APTT BLD: 29.5 SEC — SIGNIFICANT CHANGE UP (ref 27.5–35.5)
AST SERPL-CCNC: 42 U/L — HIGH (ref 10–40)
BASE EXCESS BLDV CALC-SCNC: 2.9 MMOL/L — HIGH (ref -2–2)
BASOPHILS # BLD AUTO: 0.07 K/UL — SIGNIFICANT CHANGE UP (ref 0–0.2)
BASOPHILS NFR BLD AUTO: 1.1 % — SIGNIFICANT CHANGE UP (ref 0–2)
BILIRUB SERPL-MCNC: 0.3 MG/DL — SIGNIFICANT CHANGE UP (ref 0.2–1.2)
BUN SERPL-MCNC: 28 MG/DL — HIGH (ref 7–23)
BUN SERPL-MCNC: 28 MG/DL — HIGH (ref 7–23)
CA-I SERPL-SCNC: 1.23 MMOL/L — SIGNIFICANT CHANGE UP (ref 1.12–1.3)
CALCIUM SERPL-MCNC: 9.2 MG/DL — SIGNIFICANT CHANGE UP (ref 8.4–10.5)
CALCIUM SERPL-MCNC: 9.4 MG/DL — SIGNIFICANT CHANGE UP (ref 8.4–10.5)
CHLORIDE BLDV-SCNC: 102 MMOL/L — SIGNIFICANT CHANGE UP (ref 96–108)
CHLORIDE SERPL-SCNC: 99 MMOL/L — SIGNIFICANT CHANGE UP (ref 96–108)
CHLORIDE SERPL-SCNC: 99 MMOL/L — SIGNIFICANT CHANGE UP (ref 96–108)
CO2 BLDV-SCNC: 30 MMOL/L — SIGNIFICANT CHANGE UP (ref 22–30)
CO2 SERPL-SCNC: 23 MMOL/L — SIGNIFICANT CHANGE UP (ref 22–31)
CO2 SERPL-SCNC: 23 MMOL/L — SIGNIFICANT CHANGE UP (ref 22–31)
CREAT SERPL-MCNC: 0.65 MG/DL — SIGNIFICANT CHANGE UP (ref 0.5–1.3)
CREAT SERPL-MCNC: 0.72 MG/DL — SIGNIFICANT CHANGE UP (ref 0.5–1.3)
EOSINOPHIL # BLD AUTO: 0.23 K/UL — SIGNIFICANT CHANGE UP (ref 0–0.5)
EOSINOPHIL NFR BLD AUTO: 3.5 % — SIGNIFICANT CHANGE UP (ref 0–6)
GAS PNL BLDV: 137 MMOL/L — SIGNIFICANT CHANGE UP (ref 135–145)
GAS PNL BLDV: SIGNIFICANT CHANGE UP
GLUCOSE BLDV-MCNC: 102 MG/DL — HIGH (ref 70–99)
GLUCOSE SERPL-MCNC: 104 MG/DL — HIGH (ref 70–99)
GLUCOSE SERPL-MCNC: 129 MG/DL — HIGH (ref 70–99)
HCO3 BLDV-SCNC: 28 MMOL/L — SIGNIFICANT CHANGE UP (ref 21–29)
HCT VFR BLD CALC: 37.1 % — SIGNIFICANT CHANGE UP (ref 34.5–45)
HCT VFR BLDA CALC: 29 % — LOW (ref 39–50)
HGB BLD CALC-MCNC: 9.4 G/DL — LOW (ref 11.5–15.5)
HGB BLD-MCNC: 11.5 G/DL — SIGNIFICANT CHANGE UP (ref 11.5–15.5)
IMM GRANULOCYTES NFR BLD AUTO: 0.5 % — SIGNIFICANT CHANGE UP (ref 0–1.5)
INR BLD: 0.98 RATIO — SIGNIFICANT CHANGE UP (ref 0.88–1.16)
LACTATE BLDV-MCNC: 1.8 MMOL/L — SIGNIFICANT CHANGE UP (ref 0.7–2)
LYMPHOCYTES # BLD AUTO: 1.56 K/UL — SIGNIFICANT CHANGE UP (ref 1–3.3)
LYMPHOCYTES # BLD AUTO: 23.7 % — SIGNIFICANT CHANGE UP (ref 13–44)
MAGNESIUM SERPL-MCNC: 2.4 MG/DL — SIGNIFICANT CHANGE UP (ref 1.6–2.6)
MCHC RBC-ENTMCNC: 31 GM/DL — LOW (ref 32–36)
MCHC RBC-ENTMCNC: 32 PG — SIGNIFICANT CHANGE UP (ref 27–34)
MCV RBC AUTO: 103.3 FL — HIGH (ref 80–100)
MONOCYTES # BLD AUTO: 0.59 K/UL — SIGNIFICANT CHANGE UP (ref 0–0.9)
MONOCYTES NFR BLD AUTO: 9 % — SIGNIFICANT CHANGE UP (ref 2–14)
NEUTROPHILS # BLD AUTO: 4.11 K/UL — SIGNIFICANT CHANGE UP (ref 1.8–7.4)
NEUTROPHILS NFR BLD AUTO: 62.2 % — SIGNIFICANT CHANGE UP (ref 43–77)
NRBC # BLD: 0 /100 WBCS — SIGNIFICANT CHANGE UP (ref 0–0)
NT-PROBNP SERPL-SCNC: 176 PG/ML — SIGNIFICANT CHANGE UP (ref 0–300)
PCO2 BLDV: 53 MMHG — HIGH (ref 35–50)
PH BLDV: 7.35 — SIGNIFICANT CHANGE UP (ref 7.35–7.45)
PLATELET # BLD AUTO: 160 K/UL — SIGNIFICANT CHANGE UP (ref 150–400)
PO2 BLDV: 67 MMHG — HIGH (ref 25–45)
POTASSIUM BLDV-SCNC: 4.1 MMOL/L — SIGNIFICANT CHANGE UP (ref 3.5–5.3)
POTASSIUM SERPL-MCNC: 4.5 MMOL/L — SIGNIFICANT CHANGE UP (ref 3.5–5.3)
POTASSIUM SERPL-MCNC: 5.6 MMOL/L — HIGH (ref 3.5–5.3)
POTASSIUM SERPL-SCNC: 4.5 MMOL/L — SIGNIFICANT CHANGE UP (ref 3.5–5.3)
POTASSIUM SERPL-SCNC: 5.6 MMOL/L — HIGH (ref 3.5–5.3)
PROT SERPL-MCNC: 6.9 G/DL — SIGNIFICANT CHANGE UP (ref 6–8.3)
PROTHROM AB SERPL-ACNC: 11.8 SEC — SIGNIFICANT CHANGE UP (ref 10.6–13.6)
RBC # BLD: 3.59 M/UL — LOW (ref 3.8–5.2)
RBC # FLD: 14.3 % — SIGNIFICANT CHANGE UP (ref 10.3–14.5)
SAO2 % BLDV: 93 % — HIGH (ref 67–88)
SARS-COV-2 RNA SPEC QL NAA+PROBE: SIGNIFICANT CHANGE UP
SODIUM SERPL-SCNC: 135 MMOL/L — SIGNIFICANT CHANGE UP (ref 135–145)
SODIUM SERPL-SCNC: 137 MMOL/L — SIGNIFICANT CHANGE UP (ref 135–145)
TROPONIN T, HIGH SENSITIVITY RESULT: 20 NG/L — SIGNIFICANT CHANGE UP (ref 0–51)
TROPONIN T, HIGH SENSITIVITY RESULT: 21 NG/L — SIGNIFICANT CHANGE UP (ref 0–51)
WBC # BLD: 6.59 K/UL — SIGNIFICANT CHANGE UP (ref 3.8–10.5)
WBC # FLD AUTO: 6.59 K/UL — SIGNIFICANT CHANGE UP (ref 3.8–10.5)

## 2021-05-28 PROCEDURE — 99223 1ST HOSP IP/OBS HIGH 75: CPT | Mod: GC

## 2021-05-28 PROCEDURE — 71275 CT ANGIOGRAPHY CHEST: CPT | Mod: 26

## 2021-05-28 PROCEDURE — 93010 ELECTROCARDIOGRAM REPORT: CPT

## 2021-05-28 PROCEDURE — 71275 CT ANGIOGRAPHY CHEST: CPT | Mod: 26,MA,77

## 2021-05-28 RX ORDER — ENOXAPARIN SODIUM 100 MG/ML
40 INJECTION SUBCUTANEOUS DAILY
Refills: 0 | Status: DISCONTINUED | OUTPATIENT
Start: 2021-05-28 | End: 2021-06-01

## 2021-05-28 RX ORDER — ACETAMINOPHEN 500 MG
1135 TABLET ORAL EVERY 6 HOURS
Refills: 0 | Status: DISCONTINUED | OUTPATIENT
Start: 2021-05-28 | End: 2021-05-28

## 2021-05-28 RX ORDER — CITALOPRAM 10 MG/1
20 TABLET, FILM COATED ORAL DAILY
Refills: 0 | Status: DISCONTINUED | OUTPATIENT
Start: 2021-05-28 | End: 2021-06-01

## 2021-05-28 RX ORDER — BUDESONIDE AND FORMOTEROL FUMARATE DIHYDRATE 160; 4.5 UG/1; UG/1
2 AEROSOL RESPIRATORY (INHALATION)
Qty: 0 | Refills: 0 | DISCHARGE

## 2021-05-28 RX ORDER — ACETAMINOPHEN 500 MG
650 TABLET ORAL EVERY 6 HOURS
Refills: 0 | Status: DISCONTINUED | OUTPATIENT
Start: 2021-05-28 | End: 2021-06-01

## 2021-05-28 RX ORDER — AMLODIPINE BESYLATE 2.5 MG/1
10 TABLET ORAL DAILY
Refills: 0 | Status: DISCONTINUED | OUTPATIENT
Start: 2021-05-28 | End: 2021-05-30

## 2021-05-28 RX ORDER — PREGABALIN 225 MG/1
1000 CAPSULE ORAL DAILY
Refills: 0 | Status: DISCONTINUED | OUTPATIENT
Start: 2021-05-28 | End: 2021-06-01

## 2021-05-28 RX ORDER — ONDANSETRON 8 MG/1
1 TABLET, FILM COATED ORAL
Qty: 0 | Refills: 0 | DISCHARGE

## 2021-05-28 RX ORDER — LEVOTHYROXINE SODIUM 125 MCG
75 TABLET ORAL DAILY
Refills: 0 | Status: DISCONTINUED | OUTPATIENT
Start: 2021-05-28 | End: 2021-06-01

## 2021-05-28 RX ORDER — CHLORHEXIDINE GLUCONATE 213 G/1000ML
0 SOLUTION TOPICAL
Qty: 0 | Refills: 0 | DISCHARGE

## 2021-05-28 RX ORDER — IPRATROPIUM/ALBUTEROL SULFATE 18-103MCG
3 AEROSOL WITH ADAPTER (GRAM) INHALATION EVERY 6 HOURS
Refills: 0 | Status: DISCONTINUED | OUTPATIENT
Start: 2021-05-28 | End: 2021-06-01

## 2021-05-28 RX ORDER — FAMOTIDINE 10 MG/ML
40 INJECTION INTRAVENOUS
Refills: 0 | Status: DISCONTINUED | OUTPATIENT
Start: 2021-05-28 | End: 2021-06-01

## 2021-05-28 RX ORDER — ALLOPURINOL 300 MG
300 TABLET ORAL DAILY
Refills: 0 | Status: DISCONTINUED | OUTPATIENT
Start: 2021-05-28 | End: 2021-06-01

## 2021-05-28 RX ORDER — FOLIC ACID 0.8 MG
1 TABLET ORAL DAILY
Refills: 0 | Status: DISCONTINUED | OUTPATIENT
Start: 2021-05-28 | End: 2021-06-01

## 2021-05-28 RX ORDER — LEVETIRACETAM 250 MG/1
750 TABLET, FILM COATED ORAL
Refills: 0 | Status: DISCONTINUED | OUTPATIENT
Start: 2021-05-28 | End: 2021-06-01

## 2021-05-28 RX ORDER — METOPROLOL TARTRATE 50 MG
25 TABLET ORAL
Refills: 0 | Status: DISCONTINUED | OUTPATIENT
Start: 2021-05-28 | End: 2021-05-30

## 2021-05-28 RX ORDER — CYCLOBENZAPRINE HYDROCHLORIDE 10 MG/1
1 TABLET, FILM COATED ORAL
Qty: 0 | Refills: 0 | DISCHARGE

## 2021-05-28 RX ORDER — ATORVASTATIN CALCIUM 80 MG/1
20 TABLET, FILM COATED ORAL AT BEDTIME
Refills: 0 | Status: DISCONTINUED | OUTPATIENT
Start: 2021-05-28 | End: 2021-06-01

## 2021-05-28 RX ORDER — FAMOTIDINE 10 MG/ML
5 INJECTION INTRAVENOUS
Qty: 0 | Refills: 0 | DISCHARGE

## 2021-05-28 RX ADMIN — Medication 650 MILLIGRAM(S): at 21:58

## 2021-05-28 RX ADMIN — ENOXAPARIN SODIUM 40 MILLIGRAM(S): 100 INJECTION SUBCUTANEOUS at 12:35

## 2021-05-28 RX ADMIN — FAMOTIDINE 40 MILLIGRAM(S): 10 INJECTION INTRAVENOUS at 17:32

## 2021-05-28 RX ADMIN — AMLODIPINE BESYLATE 10 MILLIGRAM(S): 2.5 TABLET ORAL at 12:34

## 2021-05-28 RX ADMIN — Medication 650 MILLIGRAM(S): at 21:10

## 2021-05-28 RX ADMIN — Medication 125 MILLIGRAM(S): at 00:33

## 2021-05-28 RX ADMIN — Medication 1 APPLICATION(S): at 19:25

## 2021-05-28 RX ADMIN — Medication 40 MILLIGRAM(S): at 12:35

## 2021-05-28 RX ADMIN — Medication 300 MILLIGRAM(S): at 12:36

## 2021-05-28 RX ADMIN — PREGABALIN 1000 MICROGRAM(S): 225 CAPSULE ORAL at 12:37

## 2021-05-28 RX ADMIN — CITALOPRAM 20 MILLIGRAM(S): 10 TABLET, FILM COATED ORAL at 12:35

## 2021-05-28 RX ADMIN — ATORVASTATIN CALCIUM 20 MILLIGRAM(S): 80 TABLET, FILM COATED ORAL at 21:10

## 2021-05-28 RX ADMIN — Medication 1 MILLIGRAM(S): at 12:33

## 2021-05-28 RX ADMIN — LEVETIRACETAM 750 MILLIGRAM(S): 250 TABLET, FILM COATED ORAL at 17:48

## 2021-05-28 RX ADMIN — Medication 3 MILLILITER(S): at 17:52

## 2021-05-28 NOTE — H&P ADULT - PROBLEM SELECTOR PLAN 7
DVT ppx: lovenox 40 SQ  Diet: NPO with tube feeds. Pending Registered Dietician recommendations.  Dispo: pending medical optimization. Likely back to [facility] DVT ppx: lovenox 40 SQ  Diet: Dysphagia diet with tube feeds. Pending Registered Dietician recommendations. Patient states that she receives tube feeds and also drinks thick liquids/dysphagia while at [facility]. According to [facility] notes, patient received tube feeds Glucerna 1.5 for 15 hours 5pm -8am nightly. She also is on a dysphagia nectar thick diet, with 4oz to be given with supervision. Patient reiterated her understanding that she is at risk for aspiration if placed on oral diet due to her CVA history.   Dispo: pending medical optimization. Likely back to [facility] rehab

## 2021-05-28 NOTE — H&P ADULT - PROBLEM SELECTOR PLAN 1
RR 22-24 on presentation. Saturating well. Placed on 2LNC in ED.  VBG with pH 7.35 and mild hypercapnia to 53. Differential includes COPD exacerbation. Pleuritic CP now resolved.  Less likely ACS, PNA, PE. s/p duoneb and solumedrol 125x1 in the ED.  CTA inconclusive for PE due to contrast extravasation. CXR partially obscured, however lower lung fields clear.   - Troponin T 20 ->21.  -c/w duonebs q6h  - Trend VBG  - Wean oxygen as able RR 22-24 on presentation. Saturating well. Placed on 2LNC in ED.  VBG with pH 7.35 and mild hypercapnia to 53. Differential includes COPD exacerbation. Pleuritic CP now resolved.  Less likely ACS, PNA, PE. s/p duoneb and solumedrol 125x1 in the ED.  CTA inconclusive for PE due to contrast extravasation. CXR partially obscured, however lower lung fields clear.   - Troponin T 20 ->21.  -c/w duonebs q6h  - Prednisone 40mg QD x5 days.   - Trend VBG  - Wean oxygen as able

## 2021-05-28 NOTE — H&P ADULT - ASSESSMENT
86 yo F PMHx CVA with residual left weakness (~3 years ago), PEG for dysphagia, hypothyroid, COPD (on no home Oxygen), HTN, gout, presents to the ED from [facility] with complaints of acute onset left CP and SOB.

## 2021-05-28 NOTE — H&P ADULT - NSHPLABSRESULTS_GEN_ALL_CORE
11.5   6.59  )-----------( 160      ( 27 May 2021 23:53 )             37.1       05-28    137  |  99  |  28<H>  ----------------------------<  129<H>  4.5   |  23  |  0.72    Ca    9.2      28 May 2021 02:15  Mg     2.4     05-27    TPro  6.9  /  Alb  3.5  /  TBili  0.3  /  DBili  x   /  AST  42<H>  /  ALT  18  /  AlkPhos  85  05-27                  PT/INR - ( 27 May 2021 23:53 )   PT: 11.8 sec;   INR: 0.98 ratio         PTT - ( 27 May 2021 23:53 )  PTT:29.5 sec    23:53 - VBG - pH: 7.35  | pCO2: 53    | pO2: 67    | Lactate: 1.8    Troponin T 20 -> 21    < from: CT Angio Chest PE Protocol w/ IV Cont (05.28.21 @ 01:20) >      IMPRESSION:    Contrast extravasation as described.    Nondiagnostic study. If there is continued clinical suspicion, repeat study or additional imaging (V/Q scan) may be obtained for further evaluation.    Additional findings as described.    < end of copied text >    < from: Xray Chest 1 View- PORTABLE-Urgent (Xray Chest 1 View- PORTABLE-Urgent .) (05.27.21 @ 23:09) >      ******PRELIMINARY REPORT******    ******PRELIMINARY REPORT******              INTERPRETATION:  The bilateral lung apices are obscured by the patient's head. The remaining portions of the lungs are clear.    < end of copied text >

## 2021-05-28 NOTE — H&P ADULT - PROBLEM SELECTOR PLAN 6
Transition of Care Status:  1. Name of PCP:  2. PCP contacted on admission: [  ] Y     [  ] N  3. PCP contacted at discharge: [  ] Y     [  ] N  4. Post-discharge appointment date and location:  5. Summary of handoff given to PCP: - Hx of gout  - c/w allopurinol 300mg QD.

## 2021-05-28 NOTE — H&P ADULT - NSHPSOCIALHISTORY_GEN_ALL_CORE
Former drinker  [facility]  Pt is immobile and bedbound at baseline

## 2021-05-28 NOTE — ED ADULT NURSE NOTE - NSIMPLEMENTINTERV_GEN_ALL_ED
Implemented All Fall with Harm Risk Interventions:  [place] to call system. Call bell, personal items and telephone within reach. Instruct patient to call for assistance. Room bathroom lighting operational. Non-slip footwear when patient is off stretcher. Physically safe environment: no spills, clutter or unnecessary equipment. Stretcher in lowest position, wheels locked, appropriate side rails in place. Provide visual cue, wrist band, yellow gown, etc. Monitor gait and stability. Monitor for mental status changes and reorient to person, place, and time. Review medications for side effects contributing to fall risk. Reinforce activity limits and safety measures with patient and family. Provide visual clues: red socks.

## 2021-05-28 NOTE — H&P ADULT - ATTENDING COMMENTS
85 year old female with PMH CVA with residual L sided weakness, PEG for dysphagia, hypothyroidism, COPD, HTN and gout who presented due to chest pain and shortness of breath. Due to her bedbound status and the fact that her chest pain was pleuritic in nature, she had a CT PE but it was a poor study as the contrast extravasated. After speaking with radiology, it was determined that the best way to proceed is with a repeat CT PE tomorrow to avoid a double contrast load in one day-will follow up results. Her EKG is non ischemic and her troponins are flat at 20 and 21, no need to trend further. She is no longer symptomatic, her chest pain may have been musculoskeletal, so once PE is ruled out tomorrow, she can likely be discharged back to [facility] (has been a resident there for 3 years). Rest of plan as above.    Rangel Convissar, DO  Pager [phone]  If no answer [phone]

## 2021-05-28 NOTE — H&P ADULT - HISTORY OF PRESENT ILLNESS
86 yo F PMHx CVA with residual left weakness, PEG for dysphagia, hypothyroid, COPD, HTN, gout, presents to the ED for acute onset left CP and SOB, pleuritic in nature, about 3 hours prior to arrival. Pain does not radiate. Denies diaphoresis. Pt states she is chronically sob but is worse now. she is immobile and bed bound. she denies fever, vomiting, abd pain, diarrhea, leg swelling.    In the ED, received duoned X3, solumedrol 125x1.  84 yo F PMHx CVA with residual left weakness (~3 years ago), PEG for dysphagia, hypothyroid, COPD (on no home Oxygen), HTN, gout, presents to the ED from [facility] with complaints of acute onset left CP and SOB. Chest Pain is left-sided, non-radiating and pleuritic in nature. States it was a 7-8/10 when it first began, now resolved. She has not felt similar CP like this before. She denies any COPD exacerbations in the past and states that she has a baseline SOB, but feel that it may be worse lately.  She also denies cough or increasing sputum production.  She denies recent travel or sick contacts.    Denies diaphoresis, n/v, constipation, diarrhea, or increased lower extremity edema.     In the ED, received duoned X3, solumedrol 125x1. ED vitals stable. Pt placed on 2LNC for comfort.

## 2021-05-28 NOTE — H&P ADULT - PROBLEM SELECTOR PLAN 2
CVA with residual left weakness. Has PEG for dysphagia. CVA with residual left weakness three years ago. Has PEG for dysphagia. CVA with residual left weakness three years ago. Has PEG for dysphagia.  - Not on ASA  - Continue to monitor.

## 2021-05-28 NOTE — ED ADULT NURSE REASSESSMENT NOTE - NS ED NURSE REASSESS COMMENT FT1
Approximately 0200, phone call was received from CT that left upper extremity/ shoulder IV had extravagated during imaging with contrast. When patient returned back to room, RN immediately removed non patent IV and placed ice pack over area. RN explained to patient why IV was being removed. Dr Dobson to the bedside to assess patient's extremity. Patient has baseline decreased sensation on left side s/p past CVA however, patient reports that she does not feel pain or discomfort at the IV site or in the left extremity; brachial and radial pulses present and patient states she does not feel a change in sensation on the left side. Patient repositioned in bed and made comfortable, pending lab and CT results; 20 g IV in L wrist remains patent and site WNL.

## 2021-05-28 NOTE — ED ADULT NURSE NOTE - OBJECTIVE STATEMENT
Patient is a 85 year old female complaining of chest discomfort. pt brought in by ems from [facility]. Patient has history of cva left sided weakness, peg tube for dysphagia. copd, htn. Patient is A&O x 4. Pt reports chest pain with SOB, pt states she has sob at baseline but this is worst. Denies complaints of fevers, chills, n/v/d, headache, syncope. Safety and comfort maintained.  Will continue to monitor.

## 2021-05-28 NOTE — H&P ADULT - PROBLEM SELECTOR PLAN 5
DVT ppx: lovenox 40 SQ  Diet: NPO with tube feeds. Pending Registered Dietician recommensations. - c/w home atorvastatin 20mg via PEG tube

## 2021-05-28 NOTE — H&P ADULT - NSHPPHYSICALEXAM_GEN_ALL_CORE
Vital Signs Last 24 Hrs  T(C): 36.4 (28 May 2021 06:10), Max: 36.5 (27 May 2021 22:34)  T(F): 97.6 (28 May 2021 06:10), Max: 97.7 (27 May 2021 22:34)  HR: 87 (28 May 2021 06:10) (64 - 87)  BP: 134/77 (28 May 2021 06:10) (100/66 - 134/77)  BP(mean): --  RR: 22 (28 May 2021 06:10) (16 - 24)  SpO2: 94% (28 May 2021 06:10) (94% - 100%)    PHYSICAL EXAM:  GENERAL: NAD, Resting in bed  HEENT:  Head atraumatic, EOMI, PERRLA, conjunctiva and sclera clear; Moist mucous membranes, normal oropharynx  NECK: Supple, No JVD, no lymphadenopathy, no thyroid nodules or enlargement  CHEST/LUNG: Clear to auscultation bilaterally; No rales, rhonchi, wheezing, or rubs. Unlabored respirations on room air  HEART: Regular rate and rhythm; No murmurs, rubs, or gallops  ABDOMEN: Bowel sounds present; Soft, Nontender, Nondistended. No hepatomegally  EXTREMITIES:  2+ Peripheral Pulses, brisk capillary refill. No clubbing, cyanosis, or edema  NERVOUS SYSTEM:  Alert & Oriented X3, non-focal and spontaneous movements of all extremities  SKIN: No rashes or lesions Vital Signs Last 24 Hrs  T(C): 36.4 (28 May 2021 06:10), Max: 36.5 (27 May 2021 22:34)  T(F): 97.6 (28 May 2021 06:10), Max: 97.7 (27 May 2021 22:34)  HR: 87 (28 May 2021 06:10) (64 - 87)  BP: 134/77 (28 May 2021 06:10) (100/66 - 134/77)  BP(mean): --  RR: 22 (28 May 2021 06:10) (16 - 24)  SpO2: 94% (28 May 2021 06:10) (94% - 100%) on 2LNC    PHYSICAL EXAM:  GENERAL: NAD, Resting in bed  HEENT:  Head atraumatic,  PERRLA, conjunctiva and sclera clear; Moist mucous membranes. Voice raspy  NECK: Supple  CHEST/LUNG: Clear to auscultation bilaterally; No rales, rhonchi, wheezing, or rubs. Unlabored respirations on room air  HEART: Regular rate and rhythm; No murmurs, rubs, or gallops  ABDOMEN: Bowel sounds present; Soft, Nontender, Nondistended. PEG tube in place  EXTREMITIES:  2+ Peripheral Pulses, brisk capillary refill. No clubbing, cyanosis, or edema. Left leg flexed, pet pt this is chronic.   NERVOUS SYSTEM:  Alert & Oriented X3, left side UE and LE weakness.   SKIN: No rashes or lesions

## 2021-05-29 ENCOUNTER — TRANSCRIPTION ENCOUNTER (OUTPATIENT)
Age: 86
End: 2021-05-29

## 2021-05-29 LAB
ANION GAP SERPL CALC-SCNC: 19 MMOL/L — HIGH (ref 5–17)
BASE EXCESS BLDV CALC-SCNC: 0.8 MMOL/L — SIGNIFICANT CHANGE UP (ref -2–2)
BUN SERPL-MCNC: 27 MG/DL — HIGH (ref 7–23)
CALCIUM SERPL-MCNC: 10 MG/DL — SIGNIFICANT CHANGE UP (ref 8.4–10.5)
CHLORIDE SERPL-SCNC: 102 MMOL/L — SIGNIFICANT CHANGE UP (ref 96–108)
CO2 BLDV-SCNC: 27 MMOL/L — SIGNIFICANT CHANGE UP (ref 22–30)
CO2 SERPL-SCNC: 20 MMOL/L — LOW (ref 22–31)
COVID-19 SPIKE DOMAIN AB INTERP: POSITIVE
COVID-19 SPIKE DOMAIN ANTIBODY RESULT: 210 U/ML — HIGH
CREAT SERPL-MCNC: 0.77 MG/DL — SIGNIFICANT CHANGE UP (ref 0.5–1.3)
GAS PNL BLDV: SIGNIFICANT CHANGE UP
GLUCOSE SERPL-MCNC: 167 MG/DL — HIGH (ref 70–99)
HCO3 BLDV-SCNC: 26 MMOL/L — SIGNIFICANT CHANGE UP (ref 21–29)
HCT VFR BLD CALC: 37.4 % — SIGNIFICANT CHANGE UP (ref 34.5–45)
HGB BLD-MCNC: 12.1 G/DL — SIGNIFICANT CHANGE UP (ref 11.5–15.5)
MAGNESIUM SERPL-MCNC: 2.6 MG/DL — SIGNIFICANT CHANGE UP (ref 1.6–2.6)
MCHC RBC-ENTMCNC: 32.4 GM/DL — SIGNIFICANT CHANGE UP (ref 32–36)
MCHC RBC-ENTMCNC: 33.4 PG — SIGNIFICANT CHANGE UP (ref 27–34)
MCV RBC AUTO: 103.3 FL — HIGH (ref 80–100)
NRBC # BLD: 0 /100 WBCS — SIGNIFICANT CHANGE UP (ref 0–0)
PCO2 BLDV: 44 MMHG — SIGNIFICANT CHANGE UP (ref 35–50)
PH BLDV: 7.38 — SIGNIFICANT CHANGE UP (ref 7.35–7.45)
PHOSPHATE SERPL-MCNC: 2.7 MG/DL — SIGNIFICANT CHANGE UP (ref 2.5–4.5)
PLATELET # BLD AUTO: 170 K/UL — SIGNIFICANT CHANGE UP (ref 150–400)
PO2 BLDV: 64 MMHG — HIGH (ref 25–45)
POTASSIUM SERPL-MCNC: 3.9 MMOL/L — SIGNIFICANT CHANGE UP (ref 3.5–5.3)
POTASSIUM SERPL-SCNC: 3.9 MMOL/L — SIGNIFICANT CHANGE UP (ref 3.5–5.3)
RBC # BLD: 3.62 M/UL — LOW (ref 3.8–5.2)
RBC # FLD: 14.6 % — HIGH (ref 10.3–14.5)
SAO2 % BLDV: 91 % — HIGH (ref 67–88)
SARS-COV-2 IGG+IGM SERPL QL IA: 210 U/ML — HIGH
SARS-COV-2 IGG+IGM SERPL QL IA: POSITIVE
SODIUM SERPL-SCNC: 141 MMOL/L — SIGNIFICANT CHANGE UP (ref 135–145)
WBC # BLD: 9.39 K/UL — SIGNIFICANT CHANGE UP (ref 3.8–10.5)
WBC # FLD AUTO: 9.39 K/UL — SIGNIFICANT CHANGE UP (ref 3.8–10.5)

## 2021-05-29 PROCEDURE — 99232 SBSQ HOSP IP/OBS MODERATE 35: CPT | Mod: GC

## 2021-05-29 RX ADMIN — ATORVASTATIN CALCIUM 20 MILLIGRAM(S): 80 TABLET, FILM COATED ORAL at 22:16

## 2021-05-29 RX ADMIN — AMLODIPINE BESYLATE 10 MILLIGRAM(S): 2.5 TABLET ORAL at 05:35

## 2021-05-29 RX ADMIN — Medication 650 MILLIGRAM(S): at 05:48

## 2021-05-29 RX ADMIN — Medication 3 MILLILITER(S): at 06:07

## 2021-05-29 RX ADMIN — Medication 75 MICROGRAM(S): at 05:34

## 2021-05-29 RX ADMIN — FAMOTIDINE 40 MILLIGRAM(S): 10 INJECTION INTRAVENOUS at 17:26

## 2021-05-29 RX ADMIN — FAMOTIDINE 40 MILLIGRAM(S): 10 INJECTION INTRAVENOUS at 06:22

## 2021-05-29 RX ADMIN — LEVETIRACETAM 750 MILLIGRAM(S): 250 TABLET, FILM COATED ORAL at 17:02

## 2021-05-29 RX ADMIN — Medication 40 MILLIGRAM(S): at 05:35

## 2021-05-29 RX ADMIN — Medication 25 MILLIGRAM(S): at 05:34

## 2021-05-29 RX ADMIN — LEVETIRACETAM 750 MILLIGRAM(S): 250 TABLET, FILM COATED ORAL at 05:35

## 2021-05-29 RX ADMIN — ENOXAPARIN SODIUM 40 MILLIGRAM(S): 100 INJECTION SUBCUTANEOUS at 11:55

## 2021-05-29 RX ADMIN — PREGABALIN 1000 MICROGRAM(S): 225 CAPSULE ORAL at 11:57

## 2021-05-29 RX ADMIN — Medication 300 MILLIGRAM(S): at 11:56

## 2021-05-29 RX ADMIN — Medication 1 APPLICATION(S): at 17:03

## 2021-05-29 RX ADMIN — CITALOPRAM 20 MILLIGRAM(S): 10 TABLET, FILM COATED ORAL at 11:56

## 2021-05-29 RX ADMIN — Medication 1 DROP(S): at 01:42

## 2021-05-29 RX ADMIN — Medication 2 DROP(S): at 22:16

## 2021-05-29 RX ADMIN — Medication 2 DROP(S): at 11:55

## 2021-05-29 RX ADMIN — Medication 3 MILLILITER(S): at 00:18

## 2021-05-29 RX ADMIN — Medication 1 MILLIGRAM(S): at 11:57

## 2021-05-29 RX ADMIN — Medication 1 APPLICATION(S): at 05:34

## 2021-05-29 RX ADMIN — Medication 650 MILLIGRAM(S): at 06:34

## 2021-05-29 RX ADMIN — Medication 25 MILLIGRAM(S): at 17:02

## 2021-05-29 NOTE — PROVIDER CONTACT NOTE (CHANGE IN STATUS NOTIFICATION) - ASSESSMENT
Pt w/o resp distress, /69 HR 98 RR 20 pox on ra 92% Pt received tylenol and lipitor @ 2130, pt had CT angio with contrast last evening.

## 2021-05-29 NOTE — DIETITIAN INITIAL EVALUATION ADULT. - CHIEF COMPLAINT
"84 yo F PMHx CVA with residual left weakness (~3 years ago), PEG for dysphagia, hypothyroid, COPD (on no home Oxygen), HTN, gout, presents to the ED from [facility] with complaints of acute onset left CP and SOB." Admitted for COPD exacerbation

## 2021-05-29 NOTE — DISCHARGE NOTE PROVIDER - HOSPITAL COURSE
HPI:  86 yo F PMHx CVA with residual left weakness (~3 years ago), PEG for dysphagia, hypothyroid, COPD (on no home Oxygen), HTN, gout, presents to the ED from [facility] with complaints of acute onset left CP and SOB. Chest Pain is left-sided, non-radiating and pleuritic in nature. States it was a 7-8/10 when it first began, now resolved. She has not felt similar CP like this before. She denies any COPD exacerbations in the past and states that she has a baseline SOB, but feel that it may be worse lately.  She also denies cough or increasing sputum production.  She denies recent travel or sick contacts.    Denies diaphoresis, n/v, constipation, diarrhea, or increased lower extremity edema.     In the ED, received duonebsX3, solumedrol 125x1. ED vitals stable. Pt placed on 2LNC for comfort.     Hospital Course:  Initial CTA was inconclusive for pulmonary embolism. Repeat CT imaging was negative for pulmonary embolism in the main pulmonary arteries. Patient was continued on duonebs q6h and started on prednisone 40mg daily for a 5 day course to treat COPD exacerbation. Patient was weaned off oxygen prior to discharge. Prior to discharge, patient's chest pain and shortness of breath resolved. Patient was discharged back to [facility]. HPI:  86 yo F PMHx CVA with residual left weakness (~3 years ago), PEG for dysphagia, hypothyroid, COPD (on no home Oxygen), HTN, gout, presents to the ED from [facility] with complaints of acute onset left CP and SOB. Chest Pain is left-sided, non-radiating and pleuritic in nature. States it was a 7-8/10 when it first began, now resolved. She has not felt similar CP like this before. She denies any COPD exacerbations in the past and states that she has a baseline SOB, but feel that it may be worse lately.  She also denies cough or increasing sputum production.  She denies recent travel or sick contacts.    Denies diaphoresis, n/v, constipation, diarrhea, or increased lower extremity edema.     In the ED, received duonebsX3, solumedrol 125x1. ED vitals stable. Pt placed on 2LNC for comfort.     Hospital Course:  Initial CTA was inconclusive for pulmonary embolism. Repeat CT imaging was negative for pulmonary embolism in the main pulmonary arteries. Patient was continued on duonebs q6h and started on prednisone 40mg daily for a 5 day course to treat COPD exacerbation. Troponins were negative and EKG was unchanged from prior without acute ischemic changes.  Patient was weaned off oxygen prior to discharge. Prior to discharge, patient's chest pain and shortness of breath resolved. Patient was discharged back to [facility]. HPI:  86 yo F PMHx CVA with residual left weakness (~3 years ago), PEG for dysphagia, hypothyroid, COPD (on no home Oxygen), HTN, gout, presents to the ED from [facility] with complaints of acute onset left CP and SOB. Chest Pain is left-sided, non-radiating and pleuritic in nature. States it was a 7-8/10 when it first began, now resolved. She has not felt similar CP like this before. She denies any COPD exacerbations in the past and states that she has a baseline SOB, but feel that it may be worse lately.  She also denies cough or increasing sputum production.  She denies recent travel or sick contacts.    Denies diaphoresis, n/v, constipation, diarrhea, or increased lower extremity edema.     In the ED, received duonebsX3, solumedrol 125x1. ED vitals stable. Pt placed on 2LNC for comfort.     Hospital Course:  Initial CTA was inconclusive for pulmonary embolism. Repeat CT imaging was negative for pulmonary embolism in the main pulmonary arteries. Patient was continued on duonebs q6h and started on prednisone 40mg daily for a 5 day course to treat COPD exacerbation to be completed on 6/2/21. Troponins were negative and EKG was unchanged from prior without acute ischemic changes.  Patient was weaned off oxygen prior to discharge. Prior to discharge, patient's chest pain and shortness of breath resolved. Patient was discharged back to [facility].     On 6/1/21, patient deemed stable for discharge to home.

## 2021-05-29 NOTE — DIETITIAN INITIAL EVALUATION ADULT. - PROBLEM SELECTOR PLAN 7
DVT ppx: lovenox 40 SQ  Diet: Dysphagia diet with tube feeds. Pending Registered Dietician recommendations. Patient states that she receives tube feeds and also drinks thick liquids/dysphagia while at [facility]. According to [facility] notes, patient received tube feeds Glucerna 1.5 for 15 hours 5pm -8am nightly. She also is on a dysphagia nectar thick diet, with 4oz to be given with supervision. Patient reiterated her understanding that she is at risk for aspiration if placed on oral diet due to her CVA history.   Dispo: pending medical optimization. Likely back to [facility] rehab

## 2021-05-29 NOTE — DISCHARGE NOTE PROVIDER - NSDCMRMEDTOKEN_GEN_ALL_CORE_FT
acetaminophen 160 mg/5 mL oral liquid: orally every 6 hours, As Needed for mild and moderate pain  allopurinol 300 mg oral tablet: 1 tab(s) by gastrostomy tube once a day   amLODIPine 10 mg oral tablet: 1 tab(s) by gastrostomy tube once a day   atorvastatin 20 mg oral tablet: 1 tab(s) orally once a day  Biotene Moisturizing Mouth oral spray: orally 3 times a day, As Needed  citalopram 10 mg/5 mL oral solution: 10 milliliter(s) orally once a day  docusate sodium 60 mg/15 mL oral syrup: 50 milliliter(s) orally once a day  famotidine 40 mg/5 mL oral liquid: 5 milliliter(s) orally 2 times a day  folic acid 1 mg oral tablet: 1 tab(s) orally once a day  levETIRAcetam 100 mg/mL oral solution: 7.5 milliliter(s) by gastrostomy tube 2 times a day   levothyroxine 75 mcg (0.075 mg) oral tablet: 1 tab(s) orally once a day  metoprolol tartrate 25 mg oral tablet: 1 tab(s) by gastrostomy tube 2 times a day   ocular lubricant ophthalmic solution: 2 drop(s) to each affected eye 3 times a day  predniSONE 20 mg oral tablet: 2 tab(s) orally once a day stop on 6/2/21  Vitamin B12 1000 mcg oral tablet: 1 tab(s) orally once a day   acetaminophen 160 mg/5 mL oral suspension: 20 milliliter(s) orally every 6 hours, As needed, Moderate Pain (4 - 6), Severe Pain (7 - 10)  allopurinol 300 mg oral tablet: 1 tab(s) by gastrostomy tube once a day   amLODIPine 10 mg oral tablet: 1 tab(s) by gastrostomy tube once a day   atorvastatin 20 mg oral tablet: 1 tab(s) orally once a day  Biotene Moisturizing Mouth oral spray: 2 spray(s) orally 3 times a day, As Needed  citalopram 10 mg/5 mL oral solution: 10 milliliter(s) orally once a day  docusate sodium 60 mg/15 mL oral syrup: 50 milliliter(s) orally once a day  famotidine 40 mg/5 mL oral liquid: 5 milliliter(s) orally 2 times a day  folic acid 1 mg oral tablet: 1 tab(s) orally once a day  ipratropium-albuterol 0.5 mg-2.5 mg/3 mL inhalation solution: 3 milliliter(s) inhaled every 6 hours  levETIRAcetam 100 mg/mL oral solution: 7.5 milliliter(s) by gastrostomy tube 2 times a day   levothyroxine 75 mcg (0.075 mg) oral tablet: 1 tab(s) orally once a day  metoprolol tartrate 25 mg oral tablet: 1 tab(s) by gastrostomy tube 2 times a day   ocular lubricant ophthalmic solution: 2 drop(s) to each affected eye 3 times a day  predniSONE 20 mg oral tablet: 2 tab(s) orally once a day stop on 6/2/21  Vitamin B12 1000 mcg oral tablet: 1 tab(s) orally once a day

## 2021-05-29 NOTE — PROGRESS NOTE ADULT - PROBLEM SELECTOR PLAN 2
CVA with residual left weakness three years ago. Has PEG for dysphagia.  - Not on ASA  - Continue to monitor.

## 2021-05-29 NOTE — PROGRESS NOTE ADULT - PROBLEM SELECTOR PLAN 7
DVT ppx: lovenox 40 SQ  Diet: Dysphagia diet with tube feeds. Pending Registered Dietician recommendations. Patient states that she receives tube feeds and also drinks thick liquids/dysphagia while at [facility]. According to [facility] notes, patient received tube feeds Glucerna 1.5 for 15 hours 5pm -8am nightly. She also is on a dysphagia nectar thick diet, with 4oz to be given with supervision. Patient reiterated her understanding that she is at risk for aspiration if placed on oral diet due to her CVA history.   Dispo: pending medical optimization. Likely back to [facility] rehab DVT ppx: lovenox 40 SQ  Diet: Dysphagia diet with tube feeds. Pending Registered Dietician recommendations. Patient states that she receives tube feeds and also drinks thick liquids/dysphagia while at [facility]. According to [facility] notes, patient received tube feeds Glucerna 1.5 for 15 hours 5pm -8am nightly. She also is on a dysphagia nectar thick diet, with 4oz to be given with supervision. Patient reiterated her understanding that she is at risk for aspiration if placed on oral diet due to her CVA history. She accepted this risk.   Dispo: pending  back to [facility] rehab

## 2021-05-29 NOTE — DISCHARGE NOTE PROVIDER - CARE PROVIDER_API CALL
Luly Chung)  Medicine  Geriatric Medicine  [address] [place] [address]  Phone: [phone]  Fax: [phone]  Follow Up Time:     Jaylen Leach (DO)  Critical Care Medicine; Internal Medicine; Pulmonary Disease  [address] [place] [address], [address]  [place] [address]  Phone: [phone]  Fax: [phone]  Follow Up Time:

## 2021-05-29 NOTE — DIETITIAN NUTRITION RISK NOTIFICATION - TREATMENT: THE FOLLOWING DIET HAS BEEN RECOMMENDED
Diet, Dysphagia 1 Pureed-Nectar Consistency Fluid:   Tube Feeding Modality: Gastrostomy  Glucerna 1.5 Naseem (GLUCERNA1.5RTH)  Total Volume for 24 Hours (mL): 900  Continuous  Starting Tube Feed Rate {mL per Hour}: 20  Increase Tube Feed Rate by (mL): 10     Every 2 hours  Until Goal Tube Feed Rate (mL per Hour): 60  Tube Feed Duration (in Hours): 15  Tube Feed Start Time: 16:00  Tube Feed Stop Time: 08:00 (05-28-21 @ 11:48) [Active]

## 2021-05-29 NOTE — DIETITIAN INITIAL EVALUATION ADULT. - OTHER INFO
This admission: Pt ordered for PO diet with EN via PEG- same regimen as listed above^. Tube feeds were titrated to goal rate over night.    Confirms NKFA, no nausea/vomiting, no GI distress, vitamin B12, folic acid micronutrient supplementation PTA, last BM unknown.     A1c 5.7% indicating good glycemic control.     Weight Hx: Pt endorses 55 pound wt gain x 4 years since resident at SNF. Per prior RD note, pt was 204 pounds (10/2018), and dosing wt this admission is 250 pounds (5/27). Pt attributes wt gain to tube feeds and decreased mobility. Also noted pt on synthroid with hx of hypothyroidism

## 2021-05-29 NOTE — PROGRESS NOTE ADULT - PROBLEM SELECTOR PLAN 1
RR 22-24 on presentation. Saturating well. Placed on 2LNC in ED.  VBG with pH 7.35 and mild hypercapnia to 53. Differential includes COPD exacerbation. Pleuritic CP now resolved.  Less likely ACS, PNA, PE. s/p duoneb and solumedrol 125x1 in the ED.  CTA inconclusive for PE due to contrast extravasation. CXR partially obscured, however lower lung fields clear.   - Troponin T 20 ->21.  - Repeat CTA PE w/o evidence of PE in main right and left pulminary arteries preliminarily. f/u final read.  -c/w duonebs q6h  - Prednisone 40mg QD x5 days. Will discharge on taper  - Trend VBG  - Wean oxygen as able RR 22-24 on presentation. Saturating well. Placed on 2LNC in ED.  VBG with pH 7.35 and mild hypercapnia to 53. Differential includes COPD exacerbation. Pleuritic CP now resolved.  Less likely ACS, PNA, PE. s/p duoneb and solumedrol 125x1 in the ED.  CTA inconclusive for PE due to contrast extravasation. CXR partially obscured, however lower lung fields clear.   - Troponin T 20 ->21.  - Repeat CTA PE w/o evidence of PE in main right and left pulminary arteries preliminarily. f/u final read.  -c/w duonebs q6h  - Prednisone 40mg QD x5 days. Stop on 6/2/21  - Trend VBG  - Wean oxygen as able

## 2021-05-29 NOTE — DIETITIAN INITIAL EVALUATION ADULT. - ADD RECOMMEND
Monitor weights closely. Monitor PO intake, GI tolerance to feeds, skin integrity and labs. RD remains available if needed, pt is aware.

## 2021-05-29 NOTE — DIETITIAN INITIAL EVALUATION ADULT. - PERTINENT MEDS FT
MEDICATIONS  (STANDING):  albuterol/ipratropium for Nebulization 3 milliLiter(s) Nebulizer every 6 hours  allopurinol 300 milliGRAM(s) Oral daily  amLODIPine   Tablet 10 milliGRAM(s) Oral daily  artificial  tears Solution 2 Drop(s) Both EYES three times a day  atorvastatin 20 milliGRAM(s) Oral at bedtime  citalopram 20 milliGRAM(s) Oral daily  clotrimazole 1% Cream 1 Application(s) Topical two times a day  cyanocobalamin 1000 MICROGram(s) Oral daily  enoxaparin Injectable 40 milliGRAM(s) SubCutaneous daily  famotidine   Suspension 40 milliGRAM(s) Oral two times a day  folic acid 1 milliGRAM(s) Oral daily  levETIRAcetam  Solution 750 milliGRAM(s) Oral two times a day  levothyroxine 75 MICROGram(s) Oral daily  metoprolol tartrate 25 milliGRAM(s) Oral two times a day  predniSONE   Tablet 40 milliGRAM(s) Oral daily

## 2021-05-29 NOTE — DIETITIAN INITIAL EVALUATION ADULT. - PROBLEM SELECTOR PLAN 1
RR 22-24 on presentation. Saturating well. Placed on 2LNC in ED.  VBG with pH 7.35 and mild hypercapnia to 53. Differential includes COPD exacerbation. Pleuritic CP now resolved.  Less likely ACS, PNA, PE. s/p duoneb and solumedrol 125x1 in the ED.  CTA inconclusive for PE due to contrast extravasation. CXR partially obscured, however lower lung fields clear.   - Troponin T 20 ->21.  -c/w duonebs q6h  - Prednisone 40mg QD x5 days.   - Trend VBG  - Wean oxygen as able
well

## 2021-05-29 NOTE — DISCHARGE NOTE PROVIDER - NSDCCPCAREPLAN_GEN_ALL_CORE_FT
PRINCIPAL DISCHARGE DIAGNOSIS  Diagnosis: COPD exacerbation  Assessment and Plan of Treatment: You were admitted for chest pain and shortness of breath likely due to COPD exacerbation. CT imaging was negative for pulmonary embolism and your cardiac enzymes were within normal limits. You were started on a medication called prednisone 40mg daily. Please continue to take this medication until 6/2/21. Please also follow-up with your primary care provider after discharge.       PRINCIPAL DISCHARGE DIAGNOSIS  Diagnosis: COPD exacerbation  Assessment and Plan of Treatment: You were admitted for chest pain and shortness of breath likely due to COPD exacerbation. CT imaging was negative for pulmonary embolism and your cardiac enzymes were within normal limits. You were placed on 2 liters oxygen, however have since been weaned off. You were started on a medication called prednisone 40mg daily. Please continue to take this medication until 6/2/21. Please also follow-up with your primary care provider after discharge.

## 2021-05-29 NOTE — DIETITIAN INITIAL EVALUATION ADULT. - ORAL INTAKE PTA/DIET HISTORY
Please note, pt is not a great historian. Pt reports she eats pureed/nectar thick textures at - consistent with transfer papers information. Per diet recall, pt does not eat a lot of solid food- mainly juices, sometimes pudding- confirmed with dietary at [facility]. Pt reports she also receives TF during the day, however per documentation, the pt was ordered for EN from 5pm-8am and receives 900mL formula daily (1350kcal and 74g protein daily). Also receives 250mL free water flush q 4 hours.

## 2021-05-29 NOTE — DIETITIAN INITIAL EVALUATION ADULT. - PERTINENT LABORATORY DATA
05-29 Na141 mmol/L Glu 167 mg/dL<H> K+ 3.9 mmol/L Cr  0.77 mg/dL BUN 27 mg/dL<H> Phos 2.7 mg/dL Alb n/a   PAB n/a

## 2021-05-29 NOTE — DISCHARGE NOTE PROVIDER - DETAILS OF MALNUTRITION DIAGNOSIS/DIAGNOSES
This patient has been assessed with a concern for Malnutrition and was treated during this hospitalization for the following Nutrition diagnosis/diagnoses:     -  05/29/2021: Morbid obesity (BMI > 40)

## 2021-05-29 NOTE — DIETITIAN INITIAL EVALUATION ADULT. - ENTERAL
Recommend decrease tube feed volume considering hx of wt gain and obesity. Recommend run Glucerna 1.5 @ 50mL/hr x 15 hours (same 5pm-8am regimen). This will provide: 750mL formula, 1125 kcal, 62 g protein (meets 10kcal/kg dosing wt 113.3 kg and 1.1g protein/kg IBW 54.4kg), in addition to foods consumed during the day. Please add Multivitamin

## 2021-05-29 NOTE — DIETITIAN INITIAL EVALUATION ADULT. - REASON INDICATOR FOR ASSESSMENT
Nutrition consult received for pt on EN Regimen PTA.  Source: Pt, EMR, prior RD note, transfer papers

## 2021-05-29 NOTE — PROGRESS NOTE ADULT - SUBJECTIVE AND OBJECTIVE BOX
PROGRESS NOTE:   Authored by Sharron Gao MD  Pager: [facility] [phone]; [phone] [phone]    Patient is a 85y old  Female who presents with a chief complaint of cp (28 May 2021 07:28)      SUBJECTIVE / OVERNIGHT EVENTS:  CTA PE done overnight,  preliminarily without PE in the main right and left arteries. Otherwise, pt with itching sensation of eyes and roof of mouth, received artifical tears. This AM, denies CP, SOB, subjective f/c, n/v.     MEDICATIONS  (STANDING):  albuterol/ipratropium for Nebulization 3 milliLiter(s) Nebulizer every 6 hours  allopurinol 300 milliGRAM(s) Oral daily  amLODIPine   Tablet 10 milliGRAM(s) Oral daily  atorvastatin 20 milliGRAM(s) Oral at bedtime  citalopram 20 milliGRAM(s) Oral daily  clotrimazole 1% Cream 1 Application(s) Topical two times a day  cyanocobalamin 1000 MICROGram(s) Oral daily  enoxaparin Injectable 40 milliGRAM(s) SubCutaneous daily  famotidine   Suspension 40 milliGRAM(s) Oral two times a day  folic acid 1 milliGRAM(s) Oral daily  levETIRAcetam  Solution 750 milliGRAM(s) Oral two times a day  levothyroxine 75 MICROGram(s) Oral daily  metoprolol tartrate 25 milliGRAM(s) Oral two times a day  predniSONE   Tablet 40 milliGRAM(s) Oral daily    MEDICATIONS  (PRN):  acetaminophen    Suspension .. 650 milliGRAM(s) Oral every 6 hours PRN Moderate Pain (4 - 6), Severe Pain (7 - 10)      CAPILLARY BLOOD GLUCOSE        I&O's Summary    28 May 2021 07:01  -  29 May 2021 06:35  --------------------------------------------------------  IN: 440 mL / OUT: 750 mL / NET: -310 mL        PHYSICAL EXAM:  Vital Signs Last 24 Hrs  T(C): 36.5 (29 May 2021 04:32), Max: 36.5 (28 May 2021 13:41)  T(F): 97.7 (29 May 2021 04:32), Max: 97.7 (28 May 2021 13:41)  HR: 94 (29 May 2021 06:09) (54 - 98)  BP: 133/68 (29 May 2021 04:32) (93/56 - 134/59)  BP(mean): --  RR: 20 (29 May 2021 04:32) (18 - 22)  SpO2: 94% (29 May 2021 06:09) (91% - 96%)    PHYSICAL EXAM:  GENERAL: NAD, Resting in bed  HEENT:  Head atraumatic,  PERRLA, conjunctiva and sclera clear; Moist mucous membranes. Voice raspy  NECK: Supple  CHEST/LUNG: Clear to auscultation bilaterally; No rales, rhonchi, wheezing, or rubs. Unlabored respirations on room air  HEART: Regular rate and rhythm; No murmurs, rubs, or gallops  ABDOMEN: Bowel sounds present; Soft, Nontender, Nondistended. PEG tube in place  EXTREMITIES:  2+ Peripheral Pulses, brisk capillary refill. No clubbing, cyanosis, or edema. Left leg flexed, pet pt this is chronic.   NERVOUS SYSTEM:  Alert & Oriented X3, left side UE and LE weakness.   SKIN: No rashes or lesions    LABS:                        11.5   6.59  )-----------( 160      ( 27 May 2021 23:53 )             37.1     05-28    137  |  99  |  28<H>  ----------------------------<  129<H>  4.5   |  23  |  0.72    Ca    9.2      28 May 2021 02:15  Mg     2.4     05-27    TPro  6.9  /  Alb  3.5  /  TBili  0.3  /  DBili  x   /  AST  42<H>  /  ALT  18  /  AlkPhos  85  05-27    PT/INR - ( 27 May 2021 23:53 )   PT: 11.8 sec;   INR: 0.98 ratio         PTT - ( 27 May 2021 23:53 )  PTT:29.5 sec            RADIOLOGY & ADDITIONAL TESTS:  < from: CT Angio Chest PE Protocol w/ IV Cont (05.28.21 @ 20:12) >  ******PRELIMINARY REPORT******    ******PRELIMINARY REPORT******              INTERPRETATION:  No pulmonary embolism in the main, right, left, or lobar arteries. Limited evaluation of the segmental and subsegmental arteries secondary to motion artifact and poor opacification. Follow up the final report.    < end of copied text >

## 2021-05-30 LAB
ANION GAP SERPL CALC-SCNC: 16 MMOL/L — SIGNIFICANT CHANGE UP (ref 5–17)
BUN SERPL-MCNC: 27 MG/DL — HIGH (ref 7–23)
CALCIUM SERPL-MCNC: 9.6 MG/DL — SIGNIFICANT CHANGE UP (ref 8.4–10.5)
CHLORIDE SERPL-SCNC: 106 MMOL/L — SIGNIFICANT CHANGE UP (ref 96–108)
CO2 SERPL-SCNC: 23 MMOL/L — SIGNIFICANT CHANGE UP (ref 22–31)
CREAT SERPL-MCNC: 0.81 MG/DL — SIGNIFICANT CHANGE UP (ref 0.5–1.3)
GLUCOSE SERPL-MCNC: 156 MG/DL — HIGH (ref 70–99)
HCT VFR BLD CALC: 36.4 % — SIGNIFICANT CHANGE UP (ref 34.5–45)
HGB BLD-MCNC: 11.4 G/DL — LOW (ref 11.5–15.5)
MAGNESIUM SERPL-MCNC: 2.5 MG/DL — SIGNIFICANT CHANGE UP (ref 1.6–2.6)
MCHC RBC-ENTMCNC: 31.3 GM/DL — LOW (ref 32–36)
MCHC RBC-ENTMCNC: 32.3 PG — SIGNIFICANT CHANGE UP (ref 27–34)
MCV RBC AUTO: 103.1 FL — HIGH (ref 80–100)
NRBC # BLD: 0 /100 WBCS — SIGNIFICANT CHANGE UP (ref 0–0)
PHOSPHATE SERPL-MCNC: 2.9 MG/DL — SIGNIFICANT CHANGE UP (ref 2.5–4.5)
PLATELET # BLD AUTO: 172 K/UL — SIGNIFICANT CHANGE UP (ref 150–400)
POTASSIUM SERPL-MCNC: 3.6 MMOL/L — SIGNIFICANT CHANGE UP (ref 3.5–5.3)
POTASSIUM SERPL-SCNC: 3.6 MMOL/L — SIGNIFICANT CHANGE UP (ref 3.5–5.3)
RBC # BLD: 3.53 M/UL — LOW (ref 3.8–5.2)
RBC # FLD: 15.1 % — HIGH (ref 10.3–14.5)
SODIUM SERPL-SCNC: 145 MMOL/L — SIGNIFICANT CHANGE UP (ref 135–145)
TSH SERPL-MCNC: 1.36 UIU/ML — SIGNIFICANT CHANGE UP (ref 0.27–4.2)
WBC # BLD: 11.33 K/UL — HIGH (ref 3.8–10.5)
WBC # FLD AUTO: 11.33 K/UL — HIGH (ref 3.8–10.5)

## 2021-05-30 PROCEDURE — 99232 SBSQ HOSP IP/OBS MODERATE 35: CPT | Mod: GC

## 2021-05-30 RX ORDER — AMLODIPINE BESYLATE 2.5 MG/1
10 TABLET ORAL DAILY
Refills: 0 | Status: DISCONTINUED | OUTPATIENT
Start: 2021-05-30 | End: 2021-06-01

## 2021-05-30 RX ORDER — METOPROLOL TARTRATE 50 MG
25 TABLET ORAL
Refills: 0 | Status: DISCONTINUED | OUTPATIENT
Start: 2021-05-30 | End: 2021-06-01

## 2021-05-30 RX ORDER — IPRATROPIUM/ALBUTEROL SULFATE 18-103MCG
3 AEROSOL WITH ADAPTER (GRAM) INHALATION ONCE
Refills: 0 | Status: COMPLETED | OUTPATIENT
Start: 2021-05-30 | End: 2021-05-30

## 2021-05-30 RX ADMIN — Medication 2 DROP(S): at 13:17

## 2021-05-30 RX ADMIN — PREGABALIN 1000 MICROGRAM(S): 225 CAPSULE ORAL at 13:17

## 2021-05-30 RX ADMIN — ATORVASTATIN CALCIUM 20 MILLIGRAM(S): 80 TABLET, FILM COATED ORAL at 21:58

## 2021-05-30 RX ADMIN — Medication 3 MILLILITER(S): at 11:45

## 2021-05-30 RX ADMIN — Medication 3 MILLILITER(S): at 22:16

## 2021-05-30 RX ADMIN — Medication 3 MILLILITER(S): at 00:09

## 2021-05-30 RX ADMIN — Medication 2 DROP(S): at 05:32

## 2021-05-30 RX ADMIN — FAMOTIDINE 40 MILLIGRAM(S): 10 INJECTION INTRAVENOUS at 17:39

## 2021-05-30 RX ADMIN — Medication 75 MICROGRAM(S): at 05:31

## 2021-05-30 RX ADMIN — Medication 2 DROP(S): at 21:58

## 2021-05-30 RX ADMIN — Medication 1 TABLET(S): at 13:17

## 2021-05-30 RX ADMIN — CITALOPRAM 20 MILLIGRAM(S): 10 TABLET, FILM COATED ORAL at 13:17

## 2021-05-30 RX ADMIN — FAMOTIDINE 40 MILLIGRAM(S): 10 INJECTION INTRAVENOUS at 05:32

## 2021-05-30 RX ADMIN — ENOXAPARIN SODIUM 40 MILLIGRAM(S): 100 INJECTION SUBCUTANEOUS at 13:17

## 2021-05-30 RX ADMIN — Medication 25 MILLIGRAM(S): at 05:31

## 2021-05-30 RX ADMIN — Medication 300 MILLIGRAM(S): at 13:17

## 2021-05-30 RX ADMIN — Medication 1 APPLICATION(S): at 17:39

## 2021-05-30 RX ADMIN — Medication 40 MILLIGRAM(S): at 05:30

## 2021-05-30 RX ADMIN — Medication 3 MILLILITER(S): at 23:49

## 2021-05-30 RX ADMIN — AMLODIPINE BESYLATE 10 MILLIGRAM(S): 2.5 TABLET ORAL at 05:30

## 2021-05-30 RX ADMIN — Medication 1 APPLICATION(S): at 05:32

## 2021-05-30 RX ADMIN — LEVETIRACETAM 750 MILLIGRAM(S): 250 TABLET, FILM COATED ORAL at 17:37

## 2021-05-30 RX ADMIN — Medication 25 MILLIGRAM(S): at 17:39

## 2021-05-30 RX ADMIN — LEVETIRACETAM 750 MILLIGRAM(S): 250 TABLET, FILM COATED ORAL at 05:31

## 2021-05-30 RX ADMIN — Medication 3 MILLILITER(S): at 05:30

## 2021-05-30 RX ADMIN — Medication 1 MILLIGRAM(S): at 13:17

## 2021-05-30 NOTE — PROGRESS NOTE ADULT - PROBLEM SELECTOR PLAN 1
RR 22-24 on presentation. Saturating well. Placed on 2LNC in ED.  VBG with pH 7.35 and mild hypercapnia to 53. Differential includes COPD exacerbation. Pleuritic CP now resolved.  Less likely ACS, PNA, PE. s/p duoneb and solumedrol 125x1 in the ED.  CTA inconclusive for PE due to contrast extravasation. CXR partially obscured, however lower lung fields clear.   - Troponin T 20 ->21.  - Repeat CTA PE w/o evidence of PE in main right and left pulminary arteries preliminarily. f/u final read.  -c/w duonebs q6h  - Prednisone 40mg QD x5 days. Stop on 6/2/21  - Trend VBG  - Wean oxygen as able RR 22-24 on presentation. Saturating well. Placed on 2LNC in ED.  VBG with pH 7.35 and mild hypercapnia to 53. Differential includes COPD exacerbation. Pleuritic CP now resolved.  Less likely ACS, PNA, PE, s/p duoneb and solumedrol 125x1 in the ED with improvement.  CTA inconclusive for PE due to contrast extravasation. CXR partially obscured, however lower lung fields clear. Most likely MSK in etiology  - Troponin T 20 ->21.  - Repeat CTA PE w/o evidence of PE in main right and left pulminary arteries preliminarily. f/u final read.  -c/w duonebs q6h  - Prednisone 40mg QD x5 days. Stop on 6/2/21  - Trend VBG  - Wean oxygen as able  - Now stable

## 2021-05-30 NOTE — PROGRESS NOTE ADULT - SUBJECTIVE AND OBJECTIVE BOX
***INCOMPLETE NOTE***  Aubrey Zambrano MD PGY-2  Internal Medicine  Pager [phone] / [phone]    Patient is a 85y old  Female who presents with a chief complaint of cp (29 May 2021 14:30)    SUBJECTIVE / OVERNIGHT EVENTS:  PEG tube clogged overnight, able to be flushed by RN.     MEDICATIONS  (STANDING):  albuterol/ipratropium for Nebulization 3 milliLiter(s) Nebulizer every 6 hours  allopurinol 300 milliGRAM(s) Oral daily  amLODIPine   Tablet 10 milliGRAM(s) Oral daily  artificial  tears Solution 2 Drop(s) Both EYES three times a day  atorvastatin 20 milliGRAM(s) Oral at bedtime  citalopram 20 milliGRAM(s) Oral daily  clotrimazole 1% Cream 1 Application(s) Topical two times a day  cyanocobalamin 1000 MICROGram(s) Oral daily  enoxaparin Injectable 40 milliGRAM(s) SubCutaneous daily  famotidine   Suspension 40 milliGRAM(s) Oral two times a day  folic acid 1 milliGRAM(s) Oral daily  levETIRAcetam  Solution 750 milliGRAM(s) Oral two times a day  levothyroxine 75 MICROGram(s) Oral daily  metoprolol tartrate 25 milliGRAM(s) Oral two times a day  multivitamin 1 Tablet(s) Oral daily  predniSONE   Tablet 40 milliGRAM(s) Oral daily    MEDICATIONS  (PRN):  acetaminophen    Suspension .. 650 milliGRAM(s) Oral every 6 hours PRN Moderate Pain (4 - 6), Severe Pain (7 - 10)      CAPILLARY BLOOD GLUCOSE        I&O's Summary    28 May 2021 07:01  -  29 May 2021 07:00  --------------------------------------------------------  IN: 440 mL / OUT: 1150 mL / NET: -710 mL    29 May 2021 07:01  -  30 May 2021 06:30  --------------------------------------------------------  IN: 0 mL / OUT: 800 mL / NET: -800 mL        PHYSICAL EXAM:  Vital Signs Last 24 Hrs  T(C): 36.4 (05-30-21 @ 04:58)  T(F): 97.5 (05-30-21 @ 04:58), Max: 98.8 (05-29-21 @ 11:51)  HR: 92 (05-30-21 @ 06:17) (89 - 97)  BP: 113/64 (05-30-21 @ 04:58)  BP(mean): --  RR: 20 (05-30-21 @ 04:58) (20 - 20)  SpO2: 97% (05-30-21 @ 06:17) (92% - 97%)  Wt(kg): --    05-28 @ 07:01  -  05-29 @ 07:00  --------------------------------------------------------  IN: 440 mL / OUT: 1150 mL / NET: -710 mL    05-29 @ 07:01  -  05-30 @ 06:30  --------------------------------------------------------  IN: 0 mL / OUT: 800 mL / NET: -800 mL      Constitutional: NAD, awake and alert  EYES: EOMI  ENT:  Normal Hearing, no tonsillar exudates   Neck: Soft and supple , no thyromegaly   Respiratory: Breath sounds are clear bilaterally, No wheezing, rales or rhonchi  Cardiovascular: S1 and S2, regular rate and rhythm, no Murmurs, gallops or rubs, no JVD,    Gastrointestinal: Bowel Sounds present, soft, nontender, nondistended, no guarding, no rebound  Extremities: No cyanosis or clubbing; warm to touch  Vascular: 2+ peripheral pulses lower ex  Neurological: No focal deficits, CN II-XII intact bilaterally, sensation to light touch intact in all extremities, gait intact. Pupils are equally reactive to light and symmetrical in size.   Musculoskeletal: 5/5 strength b/l upper and lower extremities; no joint swelling.  Skin: No rashes  Psych: no depression or anhedonia, AAOx3  HEME: no bruises, no nose bleeds      LABS:                        12.1   9.39  )-----------( 170      ( 29 May 2021 07:21 )             37.4     05-29    141  |  102  |  27<H>  ----------------------------<  167<H>  3.9   |  20<L>  |  0.77    Ca    10.0      29 May 2021 07:21  Phos  2.7     05-29  Mg     2.6     05-29                RADIOLOGY & ADDITIONAL TESTS:    Imaging Personally Reviewed:    Consultant(s) Notes Reviewed:      Care Discussed with Consultants/Other Providers:   Aubrey Zambrano MD PGY-2  Internal Medicine  Pager [phone] / [phone]    Patient is a 85y old  Female who presents with a chief complaint of cp (29 May 2021 14:30)    SUBJECTIVE / OVERNIGHT EVENTS:  PEG tube clogged overnight, able to be flushed by RN.   Reports she had similar chest discomfort to presentation this morning that improved with duoneb, now improved.   Denies nausea, vomiting, constipation, diarrhea, fevers, chills, SOB. Reports having BM.   Speaking tangentially and somewhat confused, stating she needs to go to another room to get her papers and make telephone calls. Confused about exact day.     MEDICATIONS  (STANDING):  albuterol/ipratropium for Nebulization 3 milliLiter(s) Nebulizer every 6 hours  allopurinol 300 milliGRAM(s) Oral daily  amLODIPine   Tablet 10 milliGRAM(s) Oral daily  artificial  tears Solution 2 Drop(s) Both EYES three times a day  atorvastatin 20 milliGRAM(s) Oral at bedtime  citalopram 20 milliGRAM(s) Oral daily  clotrimazole 1% Cream 1 Application(s) Topical two times a day  cyanocobalamin 1000 MICROGram(s) Oral daily  enoxaparin Injectable 40 milliGRAM(s) SubCutaneous daily  famotidine   Suspension 40 milliGRAM(s) Oral two times a day  folic acid 1 milliGRAM(s) Oral daily  levETIRAcetam  Solution 750 milliGRAM(s) Oral two times a day  levothyroxine 75 MICROGram(s) Oral daily  metoprolol tartrate 25 milliGRAM(s) Oral two times a day  multivitamin 1 Tablet(s) Oral daily  predniSONE   Tablet 40 milliGRAM(s) Oral daily    MEDICATIONS  (PRN):  acetaminophen    Suspension .. 650 milliGRAM(s) Oral every 6 hours PRN Moderate Pain (4 - 6), Severe Pain (7 - 10)      CAPILLARY BLOOD GLUCOSE        I&O's Summary    28 May 2021 07:01  -  29 May 2021 07:00  --------------------------------------------------------  IN: 440 mL / OUT: 1150 mL / NET: -710 mL    29 May 2021 07:01  -  30 May 2021 06:30  --------------------------------------------------------  IN: 0 mL / OUT: 800 mL / NET: -800 mL        PHYSICAL EXAM:  Vital Signs Last 24 Hrs  T(C): 36.4 (05-30-21 @ 04:58)  T(F): 97.5 (05-30-21 @ 04:58), Max: 98.8 (05-29-21 @ 11:51)  HR: 92 (05-30-21 @ 06:17) (89 - 97)  BP: 113/64 (05-30-21 @ 04:58)  BP(mean): --  RR: 20 (05-30-21 @ 04:58) (20 - 20)  SpO2: 97% (05-30-21 @ 06:17) (92% - 97%)  Wt(kg): --    05-28 @ 07:01  -  05-29 @ 07:00  --------------------------------------------------------  IN: 440 mL / OUT: 1150 mL / NET: -710 mL    05-29 @ 07:01  -  05-30 @ 06:30  --------------------------------------------------------  IN: 0 mL / OUT: 800 mL / NET: -800 mL    GENERAL: NAD, Resting in bed  HEENT:  Head atraumatic,  PERRLA, conjunctiva and sclera clear; Moist mucous membranes. Voice raspy  NECK: Supple  CHEST/LUNG: Clear to auscultation bilaterally; No rales, rhonchi, wheezing, or rubs. Unlabored respirations on room air  HEART: Regular rate and rhythm; No murmurs, rubs, or gallops  ABDOMEN: Bowel sounds present; Soft, Nontender, Nondistended. PEG tube in place  EXTREMITIES:  2+ Peripheral Pulses, brisk capillary refill. No clubbing, cyanosis, or edema. Left leg flexed, pet pt this is chronic.   NERVOUS SYSTEM:  Alert & Oriented X2-3, left side UE and LE weakness and contraction. Tangential speech   SKIN: No rashes or lesions    LABS:                        12.1   9.39  )-----------( 170      ( 29 May 2021 07:21 )             37.4     05-29    141  |  102  |  27<H>  ----------------------------<  167<H>  3.9   |  20<L>  |  0.77    Ca    10.0      29 May 2021 07:21  Phos  2.7     05-29  Mg     2.6     05-29        RADIOLOGY & ADDITIONAL TESTS:    Imaging Personally Reviewed:    Consultant(s) Notes Reviewed:      Care Discussed with Consultants/Other Providers:

## 2021-05-30 NOTE — PROGRESS NOTE ADULT - PROBLEM SELECTOR PLAN 7
DVT ppx: lovenox 40 SQ  Diet: Dysphagia diet with tube feeds. Pending Registered Dietician recommendations. Patient states that she receives tube feeds and also drinks thick liquids/dysphagia while at [facility]. According to [facility] notes, patient received tube feeds Glucerna 1.5 for 15 hours 5pm -8am nightly. She also is on a dysphagia nectar thick diet, with 4oz to be given with supervision. Patient reiterated her understanding that she is at risk for aspiration if placed on oral diet due to her CVA history. She accepted this risk.   Dispo: pending  back to [facility] rehab DVT ppx: lovenox 40 SQ  Diet: Dysphagia diet with tube feeds. Pending Registered Dietician recommendations. Patient states that she receives tube feeds and also drinks thick liquids/dysphagia while at [facility]. According to [facility] notes, patient received tube feeds Glucerna 1.5 for 15 hours 5pm -8am nightly. She also is on a dysphagia nectar thick diet, with 4oz to be given with supervision. Patient reiterated her understanding that she is at risk for aspiration if placed on oral diet due to her CVA history. She accepted this risk.   Dispo: pending  back to [facility] rehab, delayed due to long weekend

## 2021-05-31 DIAGNOSIS — D72.829 ELEVATED WHITE BLOOD CELL COUNT, UNSPECIFIED: ICD-10-CM

## 2021-05-31 LAB
ANION GAP SERPL CALC-SCNC: 17 MMOL/L — SIGNIFICANT CHANGE UP (ref 5–17)
BUN SERPL-MCNC: 28 MG/DL — HIGH (ref 7–23)
CALCIUM SERPL-MCNC: 9.5 MG/DL — SIGNIFICANT CHANGE UP (ref 8.4–10.5)
CHLORIDE SERPL-SCNC: 107 MMOL/L — SIGNIFICANT CHANGE UP (ref 96–108)
CO2 SERPL-SCNC: 23 MMOL/L — SIGNIFICANT CHANGE UP (ref 22–31)
CREAT SERPL-MCNC: 0.76 MG/DL — SIGNIFICANT CHANGE UP (ref 0.5–1.3)
GLUCOSE SERPL-MCNC: 128 MG/DL — HIGH (ref 70–99)
HCT VFR BLD CALC: 37.4 % — SIGNIFICANT CHANGE UP (ref 34.5–45)
HGB BLD-MCNC: 11.3 G/DL — LOW (ref 11.5–15.5)
MAGNESIUM SERPL-MCNC: 2.4 MG/DL — SIGNIFICANT CHANGE UP (ref 1.6–2.6)
MCHC RBC-ENTMCNC: 30.2 GM/DL — LOW (ref 32–36)
MCHC RBC-ENTMCNC: 31.7 PG — SIGNIFICANT CHANGE UP (ref 27–34)
MCV RBC AUTO: 104.8 FL — HIGH (ref 80–100)
NRBC # BLD: 0 /100 WBCS — SIGNIFICANT CHANGE UP (ref 0–0)
PHOSPHATE SERPL-MCNC: 2.7 MG/DL — SIGNIFICANT CHANGE UP (ref 2.5–4.5)
PLATELET # BLD AUTO: 154 K/UL — SIGNIFICANT CHANGE UP (ref 150–400)
POTASSIUM SERPL-MCNC: 3.7 MMOL/L — SIGNIFICANT CHANGE UP (ref 3.5–5.3)
POTASSIUM SERPL-SCNC: 3.7 MMOL/L — SIGNIFICANT CHANGE UP (ref 3.5–5.3)
RBC # BLD: 3.57 M/UL — LOW (ref 3.8–5.2)
RBC # FLD: 14.9 % — HIGH (ref 10.3–14.5)
SARS-COV-2 RNA SPEC QL NAA+PROBE: SIGNIFICANT CHANGE UP
SODIUM SERPL-SCNC: 147 MMOL/L — HIGH (ref 135–145)
WBC # BLD: 8.1 K/UL — SIGNIFICANT CHANGE UP (ref 3.8–10.5)
WBC # FLD AUTO: 8.1 K/UL — SIGNIFICANT CHANGE UP (ref 3.8–10.5)

## 2021-05-31 PROCEDURE — 99232 SBSQ HOSP IP/OBS MODERATE 35: CPT | Mod: GC

## 2021-05-31 RX ORDER — ONDANSETRON 8 MG/1
4 TABLET, FILM COATED ORAL ONCE
Refills: 0 | Status: DISCONTINUED | OUTPATIENT
Start: 2021-05-31 | End: 2021-06-01

## 2021-05-31 RX ADMIN — Medication 650 MILLIGRAM(S): at 18:29

## 2021-05-31 RX ADMIN — Medication 25 MILLIGRAM(S): at 18:29

## 2021-05-31 RX ADMIN — Medication 650 MILLIGRAM(S): at 19:28

## 2021-05-31 RX ADMIN — AMLODIPINE BESYLATE 10 MILLIGRAM(S): 2.5 TABLET ORAL at 06:03

## 2021-05-31 RX ADMIN — Medication 3 MILLILITER(S): at 18:30

## 2021-05-31 RX ADMIN — ENOXAPARIN SODIUM 40 MILLIGRAM(S): 100 INJECTION SUBCUTANEOUS at 13:04

## 2021-05-31 RX ADMIN — Medication 1 APPLICATION(S): at 06:01

## 2021-05-31 RX ADMIN — Medication 300 MILLIGRAM(S): at 13:04

## 2021-05-31 RX ADMIN — Medication 40 MILLIGRAM(S): at 06:01

## 2021-05-31 RX ADMIN — Medication 3 MILLILITER(S): at 12:04

## 2021-05-31 RX ADMIN — LEVETIRACETAM 750 MILLIGRAM(S): 250 TABLET, FILM COATED ORAL at 18:28

## 2021-05-31 RX ADMIN — Medication 650 MILLIGRAM(S): at 01:51

## 2021-05-31 RX ADMIN — FAMOTIDINE 40 MILLIGRAM(S): 10 INJECTION INTRAVENOUS at 18:28

## 2021-05-31 RX ADMIN — ATORVASTATIN CALCIUM 20 MILLIGRAM(S): 80 TABLET, FILM COATED ORAL at 21:23

## 2021-05-31 RX ADMIN — Medication 2 DROP(S): at 13:04

## 2021-05-31 RX ADMIN — Medication 75 MICROGRAM(S): at 06:01

## 2021-05-31 RX ADMIN — Medication 2 DROP(S): at 06:03

## 2021-05-31 RX ADMIN — Medication 3 MILLILITER(S): at 05:06

## 2021-05-31 RX ADMIN — Medication 2 DROP(S): at 21:23

## 2021-05-31 RX ADMIN — Medication 1 APPLICATION(S): at 18:29

## 2021-05-31 RX ADMIN — CITALOPRAM 20 MILLIGRAM(S): 10 TABLET, FILM COATED ORAL at 13:05

## 2021-05-31 RX ADMIN — Medication 1 MILLIGRAM(S): at 13:04

## 2021-05-31 RX ADMIN — Medication 1 TABLET(S): at 13:05

## 2021-05-31 RX ADMIN — Medication 3 MILLILITER(S): at 23:13

## 2021-05-31 RX ADMIN — FAMOTIDINE 40 MILLIGRAM(S): 10 INJECTION INTRAVENOUS at 06:02

## 2021-05-31 RX ADMIN — PREGABALIN 1000 MICROGRAM(S): 225 CAPSULE ORAL at 13:05

## 2021-05-31 RX ADMIN — Medication 650 MILLIGRAM(S): at 00:23

## 2021-05-31 RX ADMIN — Medication 25 MILLIGRAM(S): at 06:01

## 2021-05-31 RX ADMIN — LEVETIRACETAM 750 MILLIGRAM(S): 250 TABLET, FILM COATED ORAL at 06:02

## 2021-05-31 NOTE — PROGRESS NOTE ADULT - PROBLEM SELECTOR PLAN 2
CVA with residual left weakness three years ago. Has PEG for dysphagia.  - Not on ASA  - Continue to monitor. WBC increasing to 11 on 5/30. Low concern for infection. Afebrile.  - likely reactive in s/o prednisone use  - Trend CBC, fever curve

## 2021-05-31 NOTE — PROGRESS NOTE ADULT - PROBLEM SELECTOR PLAN 3
BP well-controlled  - c/w amlodipine 10mg QD and Lopressor 25 BID CVA with residual left weakness three years ago. Has PEG for dysphagia.  - Not on ASA  - Continue to monitor.

## 2021-05-31 NOTE — PROGRESS NOTE ADULT - SUBJECTIVE AND OBJECTIVE BOX
PROGRESS NOTE:   Authored by Sharron Gao MD  Pager: [facility] [phone]; [facility] [phone]    Patient is a 85y old  Female who presents with a chief complaint of cp (30 May 2021 06:30)      SUBJECTIVE / OVERNIGHT EVENTS:  NAEON.  Asking when she can go back to [facility]. Denies CP, SOB, subjective f/c, n/v.     MEDICATIONS  (STANDING):  albuterol/ipratropium for Nebulization 3 milliLiter(s) Nebulizer every 6 hours  allopurinol 300 milliGRAM(s) Oral daily  amLODIPine   Tablet 10 milliGRAM(s) Oral daily  artificial  tears Solution 2 Drop(s) Both EYES three times a day  atorvastatin 20 milliGRAM(s) Oral at bedtime  citalopram 20 milliGRAM(s) Oral daily  clotrimazole 1% Cream 1 Application(s) Topical two times a day  cyanocobalamin 1000 MICROGram(s) Oral daily  enoxaparin Injectable 40 milliGRAM(s) SubCutaneous daily  famotidine   Suspension 40 milliGRAM(s) Oral two times a day  folic acid 1 milliGRAM(s) Oral daily  levETIRAcetam  Solution 750 milliGRAM(s) Oral two times a day  levothyroxine 75 MICROGram(s) Oral daily  metoprolol tartrate 25 milliGRAM(s) Oral two times a day  multivitamin 1 Tablet(s) Oral daily  predniSONE   Tablet 40 milliGRAM(s) Oral daily    MEDICATIONS  (PRN):  acetaminophen    Suspension .. 650 milliGRAM(s) Oral every 6 hours PRN Moderate Pain (4 - 6), Severe Pain (7 - 10)      CAPILLARY BLOOD GLUCOSE        I&O's Summary    29 May 2021 07:01  -  30 May 2021 07:00  --------------------------------------------------------  IN: 0 mL / OUT: 1250 mL / NET: -1250 mL    30 May 2021 07:01  -  31 May 2021 06:36  --------------------------------------------------------  IN: 1210 mL / OUT: 1200 mL / NET: 10 mL        PHYSICAL EXAM:  Vital Signs Last 24 Hrs  T(C): 36.4 (31 May 2021 04:45), Max: 36.7 (30 May 2021 12:52)  T(F): 97.5 (31 May 2021 04:45), Max: 98.1 (30 May 2021 12:52)  HR: 92 (31 May 2021 04:45) (92 - 99)  BP: 112/62 (31 May 2021 04:45) (103/55 - 163/75)  BP(mean): --  RR: 20 (31 May 2021 04:45) (20 - 20)  SpO2: 94% (31 May 2021 04:45) (91% - 96%)    GENERAL: NAD, Resting in bed  HEENT:  Head atraumatic,  PERRLA, conjunctiva and sclera clear; Moist mucous membranes. Voice raspy  NECK: Supple  CHEST/LUNG: Clear to auscultation bilaterally; No rales, rhonchi, wheezing, or rubs. Unlabored respirations on room air  HEART: Regular rate and rhythm; No murmurs, rubs, or gallops  ABDOMEN: Bowel sounds present; Soft, Nontender, Nondistended. PEG tube in place  EXTREMITIES:  2+ Peripheral Pulses, brisk capillary refill. No clubbing, cyanosis, or edema. Left leg flexed, pet pt this is chronic.   NERVOUS SYSTEM:  Alert & Oriented X2-3, left side UE and LE weakness and contraction. Tangential speech   SKIN: No rashes or lesions    LABS:                        11.4   11.33 )-----------( 172      ( 30 May 2021 07:29 )             36.4     05-30    145  |  106  |  27<H>  ----------------------------<  156<H>  3.6   |  23  |  0.81    Ca    9.6      30 May 2021 07:30  Phos  2.9     05-30  Mg     2.5     05-30                  RADIOLOGY & ADDITIONAL TESTS:  Results Reviewed:   Imaging Personally Reviewed:  Electrocardiogram Personally Reviewed:    COORDINATION OF CARE:  Care Discussed with Consultants/Other Providers [Y/N]:  Prior or Outpatient Records Reviewed [Y/N]:   PROGRESS NOTE:   Authored by Sahrron Gao MD  Pager: [facility] [phone]; [facility] [phone]    Patient is a 85y old  Female who presents with a chief complaint of cp (30 May 2021 06:30)      SUBJECTIVE / OVERNIGHT EVENTS:  NICHOLAS. Received duonebx1 overnight. Asking when she can go back to [facility]. Denies CP, SOB, subjective f/c, n/v.     MEDICATIONS  (STANDING):  albuterol/ipratropium for Nebulization 3 milliLiter(s) Nebulizer every 6 hours  allopurinol 300 milliGRAM(s) Oral daily  amLODIPine   Tablet 10 milliGRAM(s) Oral daily  artificial  tears Solution 2 Drop(s) Both EYES three times a day  atorvastatin 20 milliGRAM(s) Oral at bedtime  citalopram 20 milliGRAM(s) Oral daily  clotrimazole 1% Cream 1 Application(s) Topical two times a day  cyanocobalamin 1000 MICROGram(s) Oral daily  enoxaparin Injectable 40 milliGRAM(s) SubCutaneous daily  famotidine   Suspension 40 milliGRAM(s) Oral two times a day  folic acid 1 milliGRAM(s) Oral daily  levETIRAcetam  Solution 750 milliGRAM(s) Oral two times a day  levothyroxine 75 MICROGram(s) Oral daily  metoprolol tartrate 25 milliGRAM(s) Oral two times a day  multivitamin 1 Tablet(s) Oral daily  predniSONE   Tablet 40 milliGRAM(s) Oral daily    MEDICATIONS  (PRN):  acetaminophen    Suspension .. 650 milliGRAM(s) Oral every 6 hours PRN Moderate Pain (4 - 6), Severe Pain (7 - 10)      CAPILLARY BLOOD GLUCOSE        I&O's Summary    29 May 2021 07:01  -  30 May 2021 07:00  --------------------------------------------------------  IN: 0 mL / OUT: 1250 mL / NET: -1250 mL    30 May 2021 07:01  -  31 May 2021 06:36  --------------------------------------------------------  IN: 1210 mL / OUT: 1200 mL / NET: 10 mL        PHYSICAL EXAM:  Vital Signs Last 24 Hrs  T(C): 36.4 (31 May 2021 04:45), Max: 36.7 (30 May 2021 12:52)  T(F): 97.5 (31 May 2021 04:45), Max: 98.1 (30 May 2021 12:52)  HR: 92 (31 May 2021 04:45) (92 - 99)  BP: 112/62 (31 May 2021 04:45) (103/55 - 163/75)  BP(mean): --  RR: 20 (31 May 2021 04:45) (20 - 20)  SpO2: 94% (31 May 2021 04:45) (91% - 96%)    GENERAL: NAD, Resting in bed  HEENT:  Head atraumatic,  PERRLA, conjunctiva and sclera clear; Moist mucous membranes. Voice raspy  NECK: Supple  CHEST/LUNG: Clear to auscultation bilaterally; No rales, rhonchi, wheezing, or rubs. Unlabored respirations on room air  HEART: Regular rate and rhythm; No murmurs, rubs, or gallops  ABDOMEN: Bowel sounds present; Soft, Nontender, Nondistended. PEG tube in place  EXTREMITIES:  2+ Peripheral Pulses, brisk capillary refill. No clubbing, cyanosis, or edema. Left leg flexed, pet pt this is chronic.   NERVOUS SYSTEM:  Alert & Oriented X2-3, left side UE and LE weakness and contraction. Tangential speech   SKIN: No rashes or lesions    LABS:                        11.4   11.33 )-----------( 172      ( 30 May 2021 07:29 )             36.4     05-30    145  |  106  |  27<H>  ----------------------------<  156<H>  3.6   |  23  |  0.81    Ca    9.6      30 May 2021 07:30  Phos  2.9     05-30  Mg     2.5     05-30                  RADIOLOGY & ADDITIONAL TESTS:  Results Reviewed:   Imaging Personally Reviewed:  Electrocardiogram Personally Reviewed:    COORDINATION OF CARE:  Care Discussed with Consultants/Other Providers [Y/N]:  Prior or Outpatient Records Reviewed [Y/N]:   PROGRESS NOTE:   Authored by Sharron Gao MD  Pager: [facility] [phone]; [facility] [phone]    Patient is a 85y old  Female who presents with a chief complaint of cp (30 May 2021 06:30)      SUBJECTIVE / OVERNIGHT EVENTS:  LETTY Received duonebx1 overnight for SOB. Asking when she can go back to [facility]. This AM, endorsing some nausea and on 1LNC.  Denies CP, SOB, subjective f/c, n/v. Alert and oriented to person and place today.   MEDICATIONS  (STANDING):  albuterol/ipratropium for Nebulization 3 milliLiter(s) Nebulizer every 6 hours  allopurinol 300 milliGRAM(s) Oral daily  amLODIPine   Tablet 10 milliGRAM(s) Oral daily  artificial  tears Solution 2 Drop(s) Both EYES three times a day  atorvastatin 20 milliGRAM(s) Oral at bedtime  citalopram 20 milliGRAM(s) Oral daily  clotrimazole 1% Cream 1 Application(s) Topical two times a day  cyanocobalamin 1000 MICROGram(s) Oral daily  enoxaparin Injectable 40 milliGRAM(s) SubCutaneous daily  famotidine   Suspension 40 milliGRAM(s) Oral two times a day  folic acid 1 milliGRAM(s) Oral daily  levETIRAcetam  Solution 750 milliGRAM(s) Oral two times a day  levothyroxine 75 MICROGram(s) Oral daily  metoprolol tartrate 25 milliGRAM(s) Oral two times a day  multivitamin 1 Tablet(s) Oral daily  predniSONE   Tablet 40 milliGRAM(s) Oral daily    MEDICATIONS  (PRN):  acetaminophen    Suspension .. 650 milliGRAM(s) Oral every 6 hours PRN Moderate Pain (4 - 6), Severe Pain (7 - 10)      CAPILLARY BLOOD GLUCOSE        I&O's Summary    29 May 2021 07:01  -  30 May 2021 07:00  --------------------------------------------------------  IN: 0 mL / OUT: 1250 mL / NET: -1250 mL    30 May 2021 07:01  -  31 May 2021 06:36  --------------------------------------------------------  IN: 1210 mL / OUT: 1200 mL / NET: 10 mL        PHYSICAL EXAM:  Vital Signs Last 24 Hrs  T(C): 36.4 (31 May 2021 04:45), Max: 36.7 (30 May 2021 12:52)  T(F): 97.5 (31 May 2021 04:45), Max: 98.1 (30 May 2021 12:52)  HR: 92 (31 May 2021 04:45) (92 - 99)  BP: 112/62 (31 May 2021 04:45) (103/55 - 163/75)  BP(mean): --  RR: 20 (31 May 2021 04:45) (20 - 20)  SpO2: 94% (31 May 2021 04:45) (91% - 96%)    GENERAL: NAD, Resting in bed  HEENT:  Head atraumatic,  PERRLA, conjunctiva and sclera clear; Moist mucous membranes. Voice raspy  NECK: Supple  CHEST/LUNG: Clear to auscultation bilaterally; No rales, rhonchi, wheezing, or rubs. Unlabored respirations on room air  HEART: Regular rate and rhythm; No murmurs, rubs, or gallops  ABDOMEN: Bowel sounds present; Soft, Nontender, Nondistended. PEG tube in place  EXTREMITIES:  2+ Peripheral Pulses, brisk capillary refill. No clubbing, cyanosis, or edema. Left leg flexed, pet pt this is chronic.   NERVOUS SYSTEM:  Alert & Oriented X2-3, left side UE and LE weakness and contraction. Tangential speech   SKIN: No rashes or lesions    LABS:                        11.4   11.33 )-----------( 172      ( 30 May 2021 07:29 )             36.4     05-30    145  |  106  |  27<H>  ----------------------------<  156<H>  3.6   |  23  |  0.81    Ca    9.6      30 May 2021 07:30  Phos  2.9     05-30  Mg     2.5     05-30                  RADIOLOGY & ADDITIONAL TESTS:  Results Reviewed:   Imaging Personally Reviewed:  Electrocardiogram Personally Reviewed:    COORDINATION OF CARE:  Care Discussed with Consultants/Other Providers [Y/N]:  Prior or Outpatient Records Reviewed [Y/N]:

## 2021-05-31 NOTE — PROGRESS NOTE ADULT - PROBLEM SELECTOR PLAN 8
Transition of Care Status:  1. Name of PCP:  2. PCP contacted on admission: [  ] Y     [  ] N  3. PCP contacted at discharge: [  ] Y     [  ] N  4. Post-discharge appointment date and location:  5. Summary of handoff given to PCP: DVT ppx: lovenox 40 SQ  Diet: Dysphagia diet with tube feeds. Pending Registered Dietician recommendations. Patient states that she receives tube feeds and also drinks thick liquids/dysphagia while at [facility]. According to [facility] notes, patient received tube feeds Glucerna 1.5 for 15 hours 5pm -8am nightly. She also is on a dysphagia nectar thick diet, with 4oz to be given with supervision. Patient reiterated her understanding that she is at risk for aspiration if placed on oral diet due to her CVA history. She accepted this risk.   Dispo: pending  back to [facility] rehab, delayed due to long weekend

## 2021-05-31 NOTE — PROGRESS NOTE ADULT - PROBLEM SELECTOR PLAN 4
-c/w levothyroxine 75mcg to be given via PEG tube. BP well-controlled  - c/w amlodipine 10mg QD and Lopressor 25 BID

## 2021-05-31 NOTE — PROGRESS NOTE ADULT - PROBLEM SELECTOR PLAN 1
RR 22-24 on presentation. Saturating well. Placed on 2LNC in ED.  VBG with pH 7.35 and mild hypercapnia to 53. Differential includes COPD exacerbation. Pleuritic CP now resolved.  Less likely ACS, PNA, PE, s/p duoneb and solumedrol 125x1 in the ED with improvement.  CTA inconclusive for PE due to contrast extravasation. CXR partially obscured, however lower lung fields clear. Most likely MSK in etiology  - Troponin T 20 ->21.  - Repeat CTA PE w/o evidence of PE in main right and left pulmonary arteries preliminarily.  -c/w duonebs q6h  - Prednisone 40mg QD x5 days. Stop on 6/2/21  - Trend VBG  - Wean oxygen as able  - Now stable

## 2021-06-01 ENCOUNTER — TRANSCRIPTION ENCOUNTER (OUTPATIENT)
Age: 86
End: 2021-06-01

## 2021-06-01 VITALS
RESPIRATION RATE: 18 BRPM | TEMPERATURE: 98 F | OXYGEN SATURATION: 95 % | SYSTOLIC BLOOD PRESSURE: 137 MMHG | DIASTOLIC BLOOD PRESSURE: 64 MMHG | HEART RATE: 98 BPM

## 2021-06-01 PROCEDURE — 96374 THER/PROPH/DIAG INJ IV PUSH: CPT

## 2021-06-01 PROCEDURE — 83735 ASSAY OF MAGNESIUM: CPT

## 2021-06-01 PROCEDURE — 85027 COMPLETE CBC AUTOMATED: CPT

## 2021-06-01 PROCEDURE — 85025 COMPLETE CBC W/AUTO DIFF WBC: CPT

## 2021-06-01 PROCEDURE — 80048 BASIC METABOLIC PNL TOTAL CA: CPT

## 2021-06-01 PROCEDURE — 84132 ASSAY OF SERUM POTASSIUM: CPT

## 2021-06-01 PROCEDURE — 85014 HEMATOCRIT: CPT

## 2021-06-01 PROCEDURE — 86769 SARS-COV-2 COVID-19 ANTIBODY: CPT

## 2021-06-01 PROCEDURE — 94640 AIRWAY INHALATION TREATMENT: CPT

## 2021-06-01 PROCEDURE — 87635 SARS-COV-2 COVID-19 AMP PRB: CPT

## 2021-06-01 PROCEDURE — 82803 BLOOD GASES ANY COMBINATION: CPT

## 2021-06-01 PROCEDURE — 99285 EMERGENCY DEPT VISIT HI MDM: CPT | Mod: 25

## 2021-06-01 PROCEDURE — 82330 ASSAY OF CALCIUM: CPT

## 2021-06-01 PROCEDURE — 82947 ASSAY GLUCOSE BLOOD QUANT: CPT

## 2021-06-01 PROCEDURE — 85730 THROMBOPLASTIN TIME PARTIAL: CPT

## 2021-06-01 PROCEDURE — 71045 X-RAY EXAM CHEST 1 VIEW: CPT

## 2021-06-01 PROCEDURE — 84100 ASSAY OF PHOSPHORUS: CPT

## 2021-06-01 PROCEDURE — 82435 ASSAY OF BLOOD CHLORIDE: CPT

## 2021-06-01 PROCEDURE — 99239 HOSP IP/OBS DSCHRG MGMT >30: CPT | Mod: GC

## 2021-06-01 PROCEDURE — 80053 COMPREHEN METABOLIC PANEL: CPT

## 2021-06-01 PROCEDURE — U0003: CPT

## 2021-06-01 PROCEDURE — U0005: CPT

## 2021-06-01 PROCEDURE — 85610 PROTHROMBIN TIME: CPT

## 2021-06-01 PROCEDURE — 93005 ELECTROCARDIOGRAM TRACING: CPT

## 2021-06-01 PROCEDURE — 71275 CT ANGIOGRAPHY CHEST: CPT

## 2021-06-01 PROCEDURE — 84484 ASSAY OF TROPONIN QUANT: CPT

## 2021-06-01 PROCEDURE — 85018 HEMOGLOBIN: CPT

## 2021-06-01 PROCEDURE — 83605 ASSAY OF LACTIC ACID: CPT

## 2021-06-01 PROCEDURE — 84295 ASSAY OF SERUM SODIUM: CPT

## 2021-06-01 PROCEDURE — 83880 ASSAY OF NATRIURETIC PEPTIDE: CPT

## 2021-06-01 PROCEDURE — 84443 ASSAY THYROID STIM HORMONE: CPT

## 2021-06-01 RX ORDER — ACETAMINOPHEN 500 MG
0 TABLET ORAL
Qty: 0 | Refills: 0 | DISCHARGE

## 2021-06-01 RX ORDER — SALIVA SUBSTITUTE COMB NO.11 351 MG
0 POWDER IN PACKET (EA) MUCOUS MEMBRANE
Qty: 0 | Refills: 0 | DISCHARGE

## 2021-06-01 RX ORDER — ACETAMINOPHEN 500 MG
20.31 TABLET ORAL
Qty: 0 | Refills: 0 | DISCHARGE
Start: 2021-06-01

## 2021-06-01 RX ORDER — IPRATROPIUM/ALBUTEROL SULFATE 18-103MCG
3 AEROSOL WITH ADAPTER (GRAM) INHALATION
Qty: 0 | Refills: 0 | DISCHARGE
Start: 2021-06-01

## 2021-06-01 RX ADMIN — LEVETIRACETAM 750 MILLIGRAM(S): 250 TABLET, FILM COATED ORAL at 05:28

## 2021-06-01 RX ADMIN — FAMOTIDINE 40 MILLIGRAM(S): 10 INJECTION INTRAVENOUS at 05:29

## 2021-06-01 RX ADMIN — CITALOPRAM 20 MILLIGRAM(S): 10 TABLET, FILM COATED ORAL at 12:08

## 2021-06-01 RX ADMIN — PREGABALIN 1000 MICROGRAM(S): 225 CAPSULE ORAL at 12:08

## 2021-06-01 RX ADMIN — ENOXAPARIN SODIUM 40 MILLIGRAM(S): 100 INJECTION SUBCUTANEOUS at 12:08

## 2021-06-01 RX ADMIN — Medication 1 TABLET(S): at 12:08

## 2021-06-01 RX ADMIN — Medication 1 APPLICATION(S): at 05:29

## 2021-06-01 RX ADMIN — Medication 75 MICROGRAM(S): at 05:27

## 2021-06-01 RX ADMIN — Medication 25 MILLIGRAM(S): at 05:27

## 2021-06-01 RX ADMIN — Medication 1 MILLIGRAM(S): at 12:08

## 2021-06-01 RX ADMIN — Medication 2 DROP(S): at 14:35

## 2021-06-01 RX ADMIN — AMLODIPINE BESYLATE 10 MILLIGRAM(S): 2.5 TABLET ORAL at 05:28

## 2021-06-01 RX ADMIN — Medication 300 MILLIGRAM(S): at 12:08

## 2021-06-01 RX ADMIN — Medication 2 DROP(S): at 05:28

## 2021-06-01 RX ADMIN — Medication 40 MILLIGRAM(S): at 05:28

## 2021-06-01 RX ADMIN — Medication 3 MILLILITER(S): at 11:34

## 2021-06-01 RX ADMIN — Medication 3 MILLILITER(S): at 05:03

## 2021-06-01 NOTE — PROGRESS NOTE ADULT - PROBLEM SELECTOR PLAN 9
Transition of Care Status:  1. Name of PCP:  2. PCP contacted on admission: [  ] Y     [  ] N  3. PCP contacted at discharge: [  ] Y     [  ] N  4. Post-discharge appointment date and location:  5. Summary of handoff given to PCP:
Transition of Care Status:  1. Name of PCP:  2. PCP contacted on admission: [  ] Y     [  ] N  3. PCP contacted at discharge: [  ] Y     [  ] N  4. Post-discharge appointment date and location:  5. Summary of handoff given to PCP:

## 2021-06-01 NOTE — PROGRESS NOTE ADULT - NUTRITIONAL ASSESSMENT
This patient has been assessed with a concern for Malnutrition and has been determined to have a diagnosis/diagnoses of Morbid obesity (BMI > 40).    This patient is being managed with:   Diet Dysphagia 1 Pureed-Nectar Consistency Fluid-  Tube Feeding Modality: Gastrostomy  Glucerna 1.5 Naseem (GLUCERNA1.5RTH)  Total Volume for 24 Hours (mL): 750  Continuous  Starting Tube Feed Rate {mL per Hour}: 20  Increase Tube Feed Rate by (mL): 10     Every 2 hours  Until Goal Tube Feed Rate (mL per Hour): 50  Tube Feed Duration (in Hours): 15  Tube Feed Start Time: 16:00  Tube Feed Stop Time: 08:00    Start Time: 01:00  Entered: May 29 2021  3:06PM    
This patient has been assessed with a concern for Malnutrition and has been determined to have a diagnosis/diagnoses of Morbid obesity (BMI > 40).    This patient is being managed with:   Diet Dysphagia 1 Pureed-Nectar Consistency Fluid-  Tube Feeding Modality: Gastrostomy  Glucerna 1.5 Naseem (GLUCERNA1.5RTH)  Total Volume for 24 Hours (mL): 750  Continuous  Starting Tube Feed Rate {mL per Hour}: 20  Increase Tube Feed Rate by (mL): 10     Every 2 hours  Until Goal Tube Feed Rate (mL per Hour): 50  Tube Feed Duration (in Hours): 15  Tube Feed Start Time: 17:00  Tube Feed Stop Time: 08:00  Entered: May 30 2021  2:55PM    
This patient has been assessed with a concern for Malnutrition and has been determined to have a diagnosis/diagnoses of Morbid obesity (BMI > 40).    This patient is being managed with:   Diet Dysphagia 1 Pureed-Nectar Consistency Fluid-  Tube Feeding Modality: Gastrostomy  Glucerna 1.5 Naseem (GLUCERNA1.5RTH)  Total Volume for 24 Hours (mL): 750  Continuous  Starting Tube Feed Rate {mL per Hour}: 20  Increase Tube Feed Rate by (mL): 10     Every 2 hours  Until Goal Tube Feed Rate (mL per Hour): 50  Tube Feed Duration (in Hours): 15  Tube Feed Start Time: 17:00  Tube Feed Stop Time: 08:00  Entered: May 31 2021  8:45AM

## 2021-06-01 NOTE — PROGRESS NOTE ADULT - SUBJECTIVE AND OBJECTIVE BOX
PROGRESS NOTE:   Authored by Sharron Gao MD  Pager: [facility] [phone]; [facility] [phone]    Patient is a 85y old  Female who presents with a chief complaint of cp (31 May 2021 06:35)      SUBJECTIVE / OVERNIGHT EVENTS:  NAEON. Yesterday, pt pulled out her IV. This AM, denies CP, SOB, subjective f/c, n/v.     MEDICATIONS  (STANDING):  albuterol/ipratropium for Nebulization 3 milliLiter(s) Nebulizer every 6 hours  allopurinol 300 milliGRAM(s) Oral daily  amLODIPine   Tablet 10 milliGRAM(s) Oral daily  artificial  tears Solution 2 Drop(s) Both EYES three times a day  atorvastatin 20 milliGRAM(s) Oral at bedtime  citalopram 20 milliGRAM(s) Oral daily  clotrimazole 1% Cream 1 Application(s) Topical two times a day  cyanocobalamin 1000 MICROGram(s) Oral daily  enoxaparin Injectable 40 milliGRAM(s) SubCutaneous daily  famotidine   Suspension 40 milliGRAM(s) Oral two times a day  folic acid 1 milliGRAM(s) Oral daily  levETIRAcetam  Solution 750 milliGRAM(s) Oral two times a day  levothyroxine 75 MICROGram(s) Oral daily  metoprolol tartrate 25 milliGRAM(s) Oral two times a day  multivitamin 1 Tablet(s) Oral daily  ondansetron Injectable 4 milliGRAM(s) IV Push once  predniSONE   Tablet 40 milliGRAM(s) Oral daily    MEDICATIONS  (PRN):  acetaminophen    Suspension .. 650 milliGRAM(s) Oral every 6 hours PRN Moderate Pain (4 - 6), Severe Pain (7 - 10)      CAPILLARY BLOOD GLUCOSE        I&O's Summary    30 May 2021 07:01  -  31 May 2021 07:00  --------------------------------------------------------  IN: 1570 mL / OUT: 2000 mL / NET: -430 mL    31 May 2021 07:01  -  01 Jun 2021 06:25  --------------------------------------------------------  IN: 866 mL / OUT: 800 mL / NET: 66 mL        PHYSICAL EXAM:  Vital Signs Last 24 Hrs  T(C): 36.8 (01 Jun 2021 04:28), Max: 36.8 (01 Jun 2021 04:28)  T(F): 98.2 (01 Jun 2021 04:28), Max: 98.2 (01 Jun 2021 04:28)  HR: 101 (01 Jun 2021 05:52) (89 - 112)  BP: 146/71 (01 Jun 2021 04:28) (129/76 - 146/71)  BP(mean): --  RR: 18 (01 Jun 2021 04:28) (18 - 20)  SpO2: 96% (01 Jun 2021 05:52) (91% - 97%)    GENERAL: NAD, Resting in bed  HEENT:  Head atraumatic,  PERRLA, conjunctiva and sclera clear; Moist mucous membranes. Voice raspy  NECK: Supple  CHEST/LUNG: Clear to auscultation bilaterally; No rales, rhonchi, wheezing, or rubs. Unlabored respirations on room air  HEART: Regular rate and rhythm; No murmurs, rubs, or gallops  ABDOMEN: Bowel sounds present; Soft, Nontender, Nondistended. PEG tube in place  EXTREMITIES:  2+ Peripheral Pulses, brisk capillary refill. No clubbing, cyanosis, or edema. Left leg flexed, pet pt this is chronic.   NERVOUS SYSTEM:  Alert & Oriented X2-3, left side UE and LE weakness and contraction. Tangential speech   SKIN: No rashes or lesions    LABS:                        11.3   8.10  )-----------( 154      ( 31 May 2021 07:12 )             37.4     05-31    147<H>  |  107  |  28<H>  ----------------------------<  128<H>  3.7   |  23  |  0.76    Ca    9.5      31 May 2021 07:11  Phos  2.7     05-31  Mg     2.4     05-31                  RADIOLOGY & ADDITIONAL TESTS:  Results Reviewed.

## 2021-06-01 NOTE — PROGRESS NOTE ADULT - PROBLEM SELECTOR PLAN 2
WBC increasing to 11 on 5/30. Low concern for infection. Afebrile.  - likely reactive in s/o prednisone use  - Trend CBC, fever curve

## 2021-06-01 NOTE — DISCHARGE NOTE NURSING/CASE MANAGEMENT/SOCIAL WORK - NSCORESITESY/N_GEN_A_CORE_RD
Physical Therapy Daily Treatment    Visit Count: 14 /17  Plan of Care: 3/25/2019 Through: 5/20/2019  Insurance Information: 5 visits authorized through 4/26/2019 (10 additional visits approproved as of 4/9)  Medical Diagnosis (from order):  715.16 (ICD-9-CM) - M17.11 (ICD-10-CM) - Primary osteoarthritis of right knee  Date of Surgery: 3/20/2019 Surgery Performed: ARTHROPLASTY KNEE TOTAL, RIGHT - Right  Physician Guidelines/Precautions: YES   Chart reviewed at time of initial evaluation (relevant co-morbidities, allergies, tests and medications listed): reiviewed    SUBJECTIVE   Doing well but continues to have some pretty significant soreness which is muscular in origin. No pain local in the knee joint, quad muscle is painful  Current Pain (0-10 scale): 5-6  Functional Change: Increased ability to descend stairs with less UE support.    OBJECTIVE   7 weeks post op as of 5/13/2019    Ambulation: Improved upright posture, improved knee extension in terminal stance, not full    Range of Motion (degrees) - after (manual therapy)    Right   Date 5/15/2019   Knee Flexion (135) 95 pre  108 post   Knee Extension (0-5) Lacking 4   Reported in degrees, active range of motion recorded unless noted as AA=active assistive or P=passive; standard testing positions unless otherwise noted, norms included in ( ); *=pain     Treatment   Manual Therapy  Posterior tibial glide with IR/ER bias in supine in max knee flexion   - concomitant traction performed   - grade 3-4  Anterior tibial glides with IR/ER bias in max knee flexion in prone   - grade 3-4  (contract relax PNF stretching performed after all mobilizations)  Proximal tibfib mobilization grade 3-4  Posterior femoral glide with IR/ER bias in supine in max knee flexion   - concomitant traction performed   - grade 3-4  Posterior tibial glide with IR/ER bias in sidelying in max knee flexion   - concomitant traction performed   - grade 3-4  Proximal tibfib mobilization grade  3-4  (Assisted by second therapist, patient agreeable to attendance)  The rationale and possible effects of IASTM (Instrument Assisted Soft Tissue Mobilization) were explained to patient, and informed, verbal consent was received.  Tool used:  blade; Patient position:  supine; Location of treatment: anterior quads right    Therapeutic Exercise   Upright bike 10 minutes with concomitant subjective performed  Time providing education in low load long duration stretch for knee flexion and knee extension techniques, to perform 4 times per day for 15 minutes    Skilled input: tactile instruction/cues, posture correction    Home Program:   Standing 3 way hip  Seated heel slide with extension isometrics  Stand hip abduction bilaterally 2 x 15 reps  Standing heel raises  Tandem stance static holds  lunge stretch with directional mobilization    Writer verbally educated the patient and received verbal consent from the patient on hand placement, positioning of patient, and techniques to be performed today including hand placement and palpation for techniques as described above and how they are pertinent to the patient's plan of care.      Suggestions for next session as indicated: progress per plan of care, review home exercise program, progress per physician guidelines.Continue to assess. Progress functional activities. Emphasis on functional range of motion for integration    ASSESSMENT   Patient tolerated today's session well with no adverse reactions to provided treatment. The patient performed well with response to manual. He entered stiff but did improve range of motion through the session. Education provided today as noted above. Ice pack wrapped with instructions to remove in 20 minutes. Continued skilled intervention to address the previously mentioned deficits would benefit the performance of functional activities and progression of goals.      Pain after treatment (patient reported, 0-10 scale): \"numeric not  provided, but improved since beginning of session\"    Result of above outlined education: Verbalizes understanding and Demonstrates understanding    THERAPY DAILY BILLING   Insurance: ANTH/BCAIDA 2. N/A    Evaluation Procedures:  No evaluation codes were used on this date of service    Timed Procedures:  Manual Therapy, 40 minutes  Therapeutic Exercise, 10 minutes    Untimed Procedures:  No untimed codes were used on this date of service    Total Treatment Time: 55 minutes   No

## 2021-06-01 NOTE — DISCHARGE NOTE NURSING/CASE MANAGEMENT/SOCIAL WORK - PATIENT PORTAL LINK FT
You can access the FollowMyHealth Patient Portal offered by [facility] by registering at the following website: [email or web address]. By joining [facility]’s FollowMyHealth portal, you will also be able to view your health information using other applications (apps) compatible with our system.

## 2021-06-01 NOTE — PROGRESS NOTE ADULT - PROBLEM SELECTOR PLAN 8
DVT ppx: lovenox 40 SQ  Diet: Dysphagia diet with tube feeds. Pending Registered Dietician recommendations. Patient states that she receives tube feeds and also drinks thick liquids/dysphagia while at [facility]. According to [facility] notes, patient received tube feeds Glucerna 1.5 for 15 hours 5pm -8am nightly. She also is on a dysphagia nectar thick diet, with 4oz to be given with supervision. Patient reiterated her understanding that she is at risk for aspiration if placed on oral diet due to her CVA history. She accepted this risk.   Dispo: pending  back to [facility] rehab, delayed due to long weekend

## 2021-06-01 NOTE — PROGRESS NOTE ADULT - ATTENDING COMMENTS
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 S/p CT chest X2 both negative for PE.   -cont 5 day steroid course for COPD exacerbation  -OOB to chair  -Awaiting insurance approval from [facility]
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 S/p CT chest X2 both negative for PE.   -cont 5 day steroid course for COPD exacerbation  -OOB to chair  -Awaiting discharge back to [facility] .
85 year old female with PMH CVA with residual L sided weakness, PEG for dysphagia, hypothyroidism, COPD, HTN and gout who presented due to chest pain and shortness of breath. Due to her bedbound status and the fact that her chest pain was pleuritic in nature, she had a CT PE but it was a poor study as the contrast extravasated. She had a repeat that was negative for PE. She is medically ready for discharge back to [facility] today. Rest of plan as above. Total time spent discharge planning 39 minutes.    Rangel Convissar, DO  Pager [phone]  If no answer [phone]
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 S/p CT chest X2 both negative for PE.   -cont 5 day steroid course for COPD exacerbation  -OOB to chair  -Awaiting discharge back to [facility] .

## 2021-07-03 ENCOUNTER — RESULT REVIEW (OUTPATIENT)
Age: 86
End: 2021-07-03

## 2021-07-04 ENCOUNTER — RESULT REVIEW (OUTPATIENT)
Age: 86
End: 2021-07-04

## 2021-09-05 ENCOUNTER — RESULT REVIEW (OUTPATIENT)
Age: 86
End: 2021-09-05

## 2021-12-10 NOTE — ED PROVIDER NOTE - PMH
[facility]      OUTPATIENT PHYSICAL THERAPY EVALUATION  PLAN OF TREATMENT FOR OUTPATIENT REHABILITATION  (COMPLETE FOR INITIAL CLAIMS ONLY)  Patient's Last Name, First Name, M.I.  YOB: 1940  Cecily Ivory                        Provider's Name  [facility] Medical Record No.  [id number]                               Onset Date:  12/09/21   Start of Care Date:  12/10/21      Type:     _X_PT   ___OT   ___SLP Medical Diagnosis:  R TKA                        PT Diagnosis:  impaired mobility   Visits from SOC:  1   _________________________________________________________________________________  Plan of Treatment/Functional Goals    Planned Interventions: bed mobility training,cryotherapy,gait training,home exercise program,patient/family education,ROM (range of motion),strengthening,transfer training,progressive activity/exercise     Goals: See Physical Therapy Goals on Care Plan in [employer] electronic health record.    Therapy Frequency: 2x/day  Predicted Duration of Therapy Intervention: 4 days  _________________________________________________________________________________    I CERTIFY THE NEED FOR THESE SERVICES FURNISHED UNDER        THIS PLAN OF TREATMENT AND WHILE UNDER MY CARE     (Physician co-signature of this document indicates review and certification of the therapy plan).                Certification date from: 12/10/21, Certification date to: 12/14/21    Referring Physician: Herber Metcalf PA-C            Initial Assessment        See Physical Therapy evaluation dated 12/10/21 in Epic electronic health record.   CVA (cerebral vascular accident)    Dyslipidemia    ETOH abuse    Gout    HTN (hypertension)    MI (myocardial infarction)    Personal history of asbestosis    UTI (lower urinary tract infection)

## 2021-12-19 NOTE — ED PROCEDURE NOTE - PROCEDURE ADDITIONAL DETAILS
Emergency Department Focused Ultrasound performed at patient's bedside for placement of ultrasound guided IV. Dynamic gray scale imaging of the target vessel was obtained using a high frequency linear transducer. Both the target vein and surrounding arterial structures were visualized and identified. The patency of the targeted vein was confirmed with compression and lack of internal echoes. There was direct visualization of needle entry into the vein followed by successful catheter placement. The ultrasound study was confirmed with blood return and ease of flushing saline.     Upper extremity laterality: R  IV Gauge: 18

## 2021-12-19 NOTE — ED ADULT NURSE NOTE - NSIMPLEMENTINTERV_GEN_ALL_ED
Implemented All Fall Risk Interventions:  [place] to call system. Call bell, personal items and telephone within reach. Instruct patient to call for assistance. Room bathroom lighting operational. Non-slip footwear when patient is off stretcher. Physically safe environment: no spills, clutter or unnecessary equipment. Stretcher in lowest position, wheels locked, appropriate side rails in place. Provide visual cue, wrist band, yellow gown, etc. Monitor gait and stability. Monitor for mental status changes and reorient to person, place, and time. Review medications for side effects contributing to fall risk. Reinforce activity limits and safety measures with patient and family.

## 2021-12-19 NOTE — H&P ADULT - ASSESSMENT
86   year old female    PMH  h/o hemorrhagic CVA, PEG,  HTN, MI,      HLD, gout    h/o right breast  mass, unable  to  do  mammo as  an in pt/   oncology/ dr victoria  saw  pt/ saw  surg had  sentinel node  localization         *  p/w SOB and  acute respiratory distress   on   bipap  in er/   with  elevated wbc of 17,000 on arrival  cxr,? pl effusion, right  lung opacity  CT c, ordered  *  dementia    *   s/p cva, has  PEG,  npo/,  on  synthroid, Keppra  ,  *  HTN, on med s per  card  prior  echo,  normal ef     rd< from: Xray Chest 1 View- PORTABLE-Urgent (12.19.21 @ 09:20) >  IMPRESSION:  Low lung volumes. New small left lower lung hazy opacity may represent   atelectasis versus pleural effusion. Redemonstration of right upper lung   perihilar opacity largely unchanged prior exam.  --- End of Report --  < end of copied text >             86   year old female      h/o hemorrhagic CVA, has  PEG,  HTN, MI,      HLD, gout   right Ca breast. s/p mastectomy in 2019,  s/p  sentinel node  localization.  4/4,  were  negative         *  p/w SOB and  acute respiratory distress   on   bipap  in er/   with  elevated wbc of 17,000 on arrival,  from pna/  probable aspiration/ gram negative pna  cxr,? pl effusion, right  lung opacity  CT c, ordered  *  dementia    *   s/p cva, has  PEG,  npo/,  on  synthroid, Keppra  ,  *  HTN, on meds  per  card  prior  echo,  normal ef  *  h/o ca breast. /, s/p  R  mastectomy     rd< from: Xray Chest 1 View- PORTABLE-Urgent (12.19.21 @ 09:20) >  IMPRESSION:  Low lung volumes. New small left lower lung hazy opacity may represent   atelectasis versus pleural effusion. Redemonstration of right upper lung   perihilar opacity largely unchanged prior exam.  --- End of Report --  < end of copied text >

## 2021-12-19 NOTE — PATIENT PROFILE ADULT - FUNCTIONAL ASSESSMENT - BASIC MOBILITY 1.
Subjective:       Patient ID: Stacey Moses is a 68 y.o. female.    Chief Complaint: Bronchitis and Medication Refill    Ms. Moses is a 68 y F w PMHx of CAD, HLD, asthma, COPD, DJD and chronic pain (has a pain contract with Dr. Prado) depression, anxiety, PUD s/p partial gastrectomy ~20yrs ago 2/2 to EGD complication presenting to establish care. Pt's prior PCP was Dr. Deleon, but he closed his office. Pt ran out of most of her medications and is requesting refills. She also reports a 3 week hx of worsening productive cough, SOB, nasal congestion, subjective fever and chills. She was previously on symbicort, albuterol and singulair for asthma/ COPD. Pt is a +30yr smoker. Smokes 1pack per week and has attempted to quit smoking on multiple occasions. She also reports being under a lot of stress and has not been taking zoloft in over a month. Denies suicidal ideation. Pt drinks alcohol on occasion and denies recreational drug use. Last colonoscopy was 2 yrs ago (next in 8 yrs). Las pap was 2 yrs ago. Both had normal results per pt. Is due for a mammogram. fam hx significant for mother w breast cancer.      Review of Systems   Constitutional: Positive for chills and fever. Negative for activity change, appetite change and diaphoresis.   HENT: Positive for congestion, sore throat and trouble swallowing.    Eyes: Negative for visual disturbance.   Respiratory: Positive for cough, chest tightness, shortness of breath and wheezing.    Cardiovascular: Negative for chest pain, palpitations and leg swelling.   Gastrointestinal: Negative for abdominal distention, abdominal pain, diarrhea, nausea and vomiting.   Genitourinary: Negative for dysuria and hematuria.   Musculoskeletal: Positive for arthralgias and back pain.   Skin: Negative for color change.   Neurological: Negative for weakness and headaches.   Psychiatric/Behavioral: Negative for confusion.       Objective:      Physical Exam    Assessment:       1.  Bronchitis    2. Depression, unspecified depression type    3. Asthma with acute exacerbation, unspecified asthma severity    4. Hyperlipidemia, unspecified hyperlipidemia type    5. Gastroesophageal reflux disease, esophagitis presence not specified    6. Chronic obstructive pulmonary disease, unspecified COPD type    7. Screening for breast cancer    8. Encounter for screening mammogram for malignant neoplasm of breast         Plan:     Bronchitis  -     azithromycin tablet 250 mg; Take 1 tablet (250 mg total) by mouth once daily.   -if cough not improved will consider tx w steroids    Depression, unspecified depression type  -     sertraline (ZOLOFT) 25 MG tablet; Take 4 tablets (100 mg total) by mouth once daily.  Dispense: 30 tablet; Refill: 11    Asthma with acute exacerbation, unspecified asthma severity  -     albuterol 90 mcg/actuation inhaler; Inhale 2 puffs into the lungs every 6 (six) hours as needed for Wheezing or Shortness of Breath.  Dispense: 1 Inhaler; Refill: 0  -     budesonide-formoterol 160-4.5 mcg (SYMBICORT) 160-4.5 mcg/actuation HFAA; Inhale 2 puffs into the lungs every 12 (twelve) hours.  Dispense: 1 Inhaler; Refill: 11  -     montelukast (SINGULAIR) 10 mg tablet; Take 1 tablet (10 mg total) by mouth every evening.  Dispense: 30 tablet; Refill: 11    Hyperlipidemia, unspecified hyperlipidemia type  -     gemfibrozil (LOPID) 600 MG tablet; Take 1 tablet (600 mg total) by mouth 2 (two) times daily before meals.  Dispense: 30 tablet; Refill: 11    Gastroesophageal reflux disease, esophagitis presence not specified  -     famotidine (PEPCID) 20 MG tablet; Take 1 tablet (20 mg total) by mouth 2 (two) times daily.  Dispense: 20 tablet; Refill: 0    Chronic obstructive pulmonary disease, unspecified COPD type  -     albuterol 90 mcg/actuation inhaler; Inhale 2 puffs into the lungs every 6 (six) hours as needed for Wheezing or Shortness of Breath.  Dispense: 1 Inhaler; Refill: 0  -      budesonide-formoterol 160-4.5 mcg (SYMBICORT) 160-4.5 mcg/actuation HFAA; Inhale 2 puffs into the lungs every 12 (twelve) hours.  Dispense: 1 Inhaler; Refill: 11  -     montelukast (SINGULAIR) 10 mg tablet; Take 1 tablet (10 mg total) by mouth every evening.  Dispense: 30 tablet; Refill: 11    Screening for breast cancer  -     Mammo Digital Screening Bilateral With CAD; Future; Expected date: 07/17/2017    Encounter for screening mammogram for malignant neoplasm of breast   -     Mammo Digital Screening Bilateral With CAD; Future; Expected date: 07/17/2017      RTC in 6mths for f/up of depression and health maintenance.    Case discussed with Dr. Chowdhury.     Shelby Agee PGY-2   Internal Medicine   1 = Total assistance

## 2021-12-19 NOTE — ED PROVIDER NOTE - CLINICAL SUMMARY MEDICAL DECISION MAKING FREE TEXT BOX
Attending/MD Tj. 87 yo F from NH, for fever, and respiratory distress, rectum 100.7, concerning for sepsis from respiratory infection, will start abx but limit fluid for potential low cardiac function, initially desat but now recovered to 97% on RA, RVP, xray, labs, admission for respiratory infection and copd exacerbation. steroid is given but due to low cooperation, duo neb is hard to be administered.

## 2021-12-19 NOTE — ED ADULT NURSE NOTE - OBJECTIVE STATEMENT
85 yo female with a PMH of morbid obesity, ETOH abuse, UTI, HLD, gout, personal history of asbestosis, HTN, MI, CVA with L residual weakness presents to the ED via EMS from [facility] complaining of a fever. As per EMS, patient was wheezing upon arrival and was SOB and was altered. Was given 125mg of solumedrol through R hand PIV started by EMS and states that shortly PTA patient was A&Ox3. Upon arrival, patient remains A&Ox3. Patient complaining of L axillary pain. Patient's L side is contracted, screaming when trying to move L arm or L leg. 2 RNs attempted to start PIV, unable to obtain--provider placed u/s guided PIVs. Patient was changed and patient straight cath'd for urine using sterile technique. Second RN present to confirm sterility. Explained procedure as it was being done - Pt tolerated procedure well. Sterile specimens collected and sent to lab as ordered. drained aprox 150mls of yellow urine. Patient noted to have audible wheezing. Denies headache, dizziness, vision changes, chest pain,  abdominal pain, nausea, vomiting, diarrhea, chills, dysuria, hematuria, recent illness travel or fall. 87 yo female with a PMH of morbid obesity, ETOH abuse, UTI, HLD, gout, personal history of asbestosis, HTN, MI, CVA with L residual weakness presents to the ED via EMS from [facility] complaining of a fever. As per EMS, patient was wheezing upon arrival and was SOB and was altered. Was given 125mg of solumedrol through R hand PIV started by EMS and states that shortly PTA patient was A&Ox3. Upon arrival, patient remains A&Ox2-3. Patient complaining of L axillary pain. Patient's L side is contracted, screaming when trying to move L arm or L leg. Noted to have a peg tube, clean and dry at site. 2 RNs attempted to start PIV, unable to obtain--provider placed u/s guided PIVs. Patient was changed and patient straight cath'd for urine using sterile technique. Second RN present to confirm sterility. Explained procedure as it was being done - Pt tolerated procedure well. Sterile specimens collected and sent to lab as ordered. drained aprox 150mls of yellow urine. Patient noted to have audible wheezing. Denies headache, dizziness, vision changes, chest pain,  abdominal pain, nausea, vomiting, diarrhea, chills, dysuria, hematuria, recent illness travel or fall.

## 2021-12-19 NOTE — PATIENT PROFILE ADULT - FALL HARM RISK - RISK INTERVENTIONS
Assistance OOB with selected safe patient handling equipment/Assistance with ambulation/Communicate Fall Risk and Risk Factors to all staff, patient, and family/Discuss with provider need for PT consult/Monitor gait and stability/Reinforce activity limits and safety measures with patient and family/Visual Cue: Yellow wristband/Bed in lowest position, wheels locked, appropriate side rails in place/Call bell, personal items and telephone in reach/Instruct patient to call for assistance before getting out of bed or chair/Non-slip footwear when patient is out of bed/[place] to call system/Physically safe environment - no spills, clutter or unnecessary equipment/Purposeful Proactive Rounding/Room/bathroom lighting operational, light cord in reach

## 2021-12-19 NOTE — CHART NOTE - NSCHARTNOTEFT_GEN_A_CORE
House pulmonary consulted for  patient p/w SOB and  acute respiratory distress   currently on   bipap  in ED  with  elevated wbc of 17,000 on arrival,    Concern for Pneumonia /  probable aspiration/ gram negative PNA  CXR : ? pl effusion, right  lung opacity  CT chest , ordered  As per house pulm : C/w solumedrol 40mg V q8hrs   Duonebs, wean oxygen gradually.  F/u Infectious w/u   C/w current Abx  Will see patient later.

## 2021-12-19 NOTE — ED ADULT NURSE REASSESSMENT NOTE - NS ED NURSE REASSESS COMMENT FT1
MAR at bedside, states that patient appears lethargic. RN went to reassess, patient is A&Ox2 (disoriented to year). Patient more difficult to arouse and awake. Respiratory called and placed on bipap. Mr. Wall is concerned about elevated BP. He reports he has ringing in both ears, which he attributes to elevated BP. Denies major changes to routine. Has been going to the gym, etc.     Last 5 Patient Entered Red[place] Current 30 Day Average: 146/71     Recent Readings 10/23/2017 10/23/2017 10/23/2017 10/17/2017 10/17/2017    Systolic BP (mmHg) 152 151 156 153 157    Diastolic BP (mmHg) 74 72 71 72 70    Pulse 56 57 56 54 54          BP above goal  Start low dose valsartan- patient with many intolerances to medications including previous GI distress with olmesartan. Discussed in detail and pt is amenable to trial.  Start valsartan 40mg  Continue monitoring    Hypertension Medications             LASIX 40 mg tablet TAKE 1 TABLET BY MOUTH EVERY MORNING    metoprolol succinate (TOPROL-XL) 25 MG 24 hr tablet Take 1 tablet (25 mg total) by mouth once daily. Take an additional tablet if BP > 160. KALIN 1.    valsartan (DIOVAN) 40 MG tablet Take 1 tablet (40 mg total) by mouth every evening.    verapamil (VERELAN) 240 MG C24P Take 1 capsule (240 mg total) by mouth 2 (two) times daily. MYLAN generic only. RPH: please profile.

## 2021-12-19 NOTE — H&P ADULT - HISTORY OF PRESENT ILLNESS
. 85 yo F     from [place] [facility], CVA with residual left weakness (~3 years ago), PEG for dysphagia, hypothyroid, COPD (on no home Oxygen), HTN, gout,   has  a peg    p/w fever,/sob    Pt has respiratory distress, wheezing, concerning for respiratory infection, otherwise no other symptoms is reported on the chart. EMS gave solumedrol 125

## 2021-12-19 NOTE — CONSULT NOTE ADULT - ASSESSMENT
87 yo F from [facility], CVA with residual left weakness (~3 years ago), PEG for dysphagia, hypothyroid, COPD (on no home Oxygen), HTN, gout, p/w fever, Pt has respiratory distress, wheezing, concerning for respiratory infection, otherwise no other symptoms is reported on the chart. EMS gave solumedrol 125 through peripheral, small iv line.  pt is well known to me with hx of htn, ashd, s/p MI, cva with increasing sob on bipap in er.  can not get any hx from the pt.  sob ?respiratory failure ?sec to pneumoniae  ?pleural effusion, check ct chest no comtrast  continue bp meds  dvt prophylaxis  abx  awaiting covid test  will consider to possible repeat echo  duoneb  dvt prophylaxis

## 2021-12-19 NOTE — H&P ADULT - NSHPLABSRESULTS_GEN_ALL_CORE
LABS:                        10.6   17.77 )-----------( 270      ( 19 Dec 2021 08:45 )             34.4         135  |  95<L>  |  37<H>  ----------------------------<  186<H>  5.4<H>   |  23  |  0.99    Ca    9.2      19 Dec 2021 08:45    TPro  7.8  /  Alb  3.4  /  TBili  0.3  /  DBili  x   /  AST  10  /  ALT  15  /  AlkPhos  108            Urinalysis Basic - ( 19 Dec 2021 08:46 )    Color: Yellow / Appearance: Clear / S.021 / pH: x  Gluc: x / Ketone: Negative  / Bili: Negative / Urobili: 2 mg/dL   Blood: x / Protein: 30 mg/dL / Nitrite: Negative   Leuk Esterase: Negative / RBC: 1 /hpf / WBC 1 /HPF   Sq Epi: x / Non Sq Epi: 4 /hpf / Bacteria: Negative           @ 08:40  6.5  25

## 2021-12-19 NOTE — ED PROVIDER NOTE - PROGRESS NOTE DETAILS
Attending/MD Tj. due to hypoxia, respiratory distress, and potential limited cardiac function, fluid amount is limited for sepsis treatment. Malorie Spain MD, PGY-2: signout given to hospitalist

## 2021-12-19 NOTE — CHART NOTE - NSCHARTNOTEFT_GEN_A_CORE
Patient seen at bedside. AxO x 2-3. Appears comfortable on BiPAP. Called daughter confirm history. Daughter unaware of what exactly happened at rehab, just that they told her "mom wasn't breathing that great." As per daughter son should be the primary . Called son, who is more involved in her care. Patient's son reports patient has been gaining weight while she was in rehab. Unable to provide more history. Unable to reach [facility] rehab for clarification. History reviewed in chart. Will try calling again tomorrow.     Hyeokchan Kwon , PGY3  Pager: [phone] Patient seen at bedside. AxO x 2-3. Appears comfortable on BiPAP. Called daughter confirm history. Daughter unaware of what exactly happened at rehab, just that they told her "mom wasn't breathing that great." As per daughter son should be the primary . Called son, who is more involved in her care. Patient's son reports patient has been gaining weight while she was in rehab. Unable to provide more history. Unable to reach [facility] rehab for clarification. History reviewed in chart. Will try calling again tomorrow.     As per nurse, pt reporting some abdominal pain around PEG site. PEG site appears erythematous w/ green discharge. Concern for underlying cellulitis/abscess. Will get CTAP w/IV contrast.     Hyeokchan Kwon , PGY3  Pager: [phone] Patient seen at bedside. AxO x 2-3. Appears comfortable on BiPAP. Called daughter confirm history. Daughter unaware of what exactly happened at rehab, just that they told her "mom wasn't breathing that great." As per daughter son should be the primary . Called son, who is more involved in her care. Patient's son reports patient has been gaining weight while she was in rehab. Unable to provide more history. Unable to reach [facility] rehab for clarification. History reviewed in chart. Will try calling again tomorrow.     As per nurse, pt reporting some abdominal pain around PEG site. PEG site appears erythematous w/ green discharge. Concern for underlying cellulitis/abscess. Will get CTAP w/IV contrast.     Will add azithro for atypical coverage, will add on urine legionella. DC if urine legionella neg    Hyeokchan Kwon , PGY3  Pager: [phone]

## 2021-12-19 NOTE — ED PROVIDER NOTE - OBJECTIVE STATEMENT
Attending/MD Tj. 87 yo F from [facility] rehab, CVA with residual left weakness (~3 years ago), PEG for dysphagia, hypothyroid, COPD (on no home Oxygen), HTN, gout, p/w fever, Pt has respiratory distress, wheezing, concerning for respiratory infection, otherwise no other symptoms is reported on the chart. EMS gave solumedrol 125 through peripheral, small iv line.

## 2021-12-19 NOTE — ED PROVIDER NOTE - ATTENDING CONTRIBUTION TO CARE
I have personally seen and examined this patient.  I have fully participated in the care of this patient. I performed a substantive portion of the visit including all aspects of the medical decision making. I have reviewed all pertinent clinical information, including history, physical exam, plan and the Resident’s note and agree except as noted. - MD Tj.    Attending, Tj SALVADOR:  I have independently evaluated the patient and have documented in the appropriate sections above.  I agree with the exam and plan as noted above.

## 2021-12-19 NOTE — ED PROVIDER NOTE - PHYSICAL EXAMINATION
Attending/MD Tj.   VITALS: reviewed  GEN: No apparent distress, A & O x3  HEAD/EYES: NC/AT, PERRL, EOMI, anicteric sclerae, no conjunctival pallor  ENT: mucus membranes moist, trachea midline, no JVD, neck is supple  RESP: minimum wheeze, bilateral, chest wall nontender and atraumatic  CV: tachy, no murmur; distal pulses intact and symmetric bilaterally  ABDOMEN: normoactive bowel sounds, soft, nondistended, nontender  MSK: extremities atraumatic and nontender, no edema, no asymmetry.   SKIN: warm, dry, no rash, no bruising, no cyanosis.  NEURO: alert, mentating appropriately, no facial asymmetry. moving foure extremities  PSYCH: Affect appropriate

## 2021-12-19 NOTE — ED PROVIDER NOTE - EKG ADDITIONAL INFORMATION FREE TEXT
NSR, no ST segement alterations. sinus rhythm without life-threatening arrhythmic patter such as short or long WA interval, ipsilon wave form, or Brugada pattern.

## 2021-12-19 NOTE — H&P ADULT - NSHPPHYSICALEXAM_GEN_ALL_CORE
PHYSICAL EXAMINATION:  Vital Signs Last 24 Hrs  T(C): 37.2 (19 Dec 2021 13:48), Max: 38.2 (19 Dec 2021 08:00)  T(F): 98.9 (19 Dec 2021 13:48), Max: 100.8 (19 Dec 2021 08:00)  HR: 80 (19 Dec 2021 13:48) (80 - 709)  BP: 123/51 (19 Dec 2021 13:48) (117/52 - 126/66)  BP(mean): 65 (19 Dec 2021 13:48) (65 - 76)  RR: 16 (19 Dec 2021 13:48) (16 - 28)  SpO2: 100% (19 Dec 2021 13:48) (94% - 100%)  CAPILLARY BLOOD GLUCOSE            GENERAL: NAD, well-groomed,  HEAD:  atraumatic, normocephalic  EYES: sclera anicteric  ENMT: mucous membranes moist  NECK: supple, No JVD  CHEST/LUNG: clear to auscultation bilaterally;    no      rales   ,   no rhonchi,   HEART: normal S1, S2  ABDOMEN: BS+, soft, ND, NT   EXTREMITIES:    no    edema    b/l LEs  NEURO: awake, ,     moves all extremities  SKIN: no     rash  peg

## 2021-12-19 NOTE — ED PROCEDURE NOTE - PROCEDURE ADDITIONAL DETAILS
Emergency Department Focused Ultrasound performed at patient's bedside for placement of ultrasound guided IV. Dynamic gray scale imaging of the target vessel was obtained using a high frequency linear transducer. Both the target vein and surrounding arterial structures were visualized and identified. The patency of the targeted vein was confirmed with compression and lack of internal echoes. There was direct visualization of needle entry into the vein followed by successful catheter placement. The ultrasound study was confirmed with blood return and ease of flushing saline.     Upper extremity laterality: 18  IV Gauge: R

## 2021-12-19 NOTE — CONSULT NOTE ADULT - SUBJECTIVE AND OBJECTIVE BOX
CHIEF COMPLAINT:Patient is a 86y old  Female who presents with a chief complaint of     HPI:      PAST MEDICAL & SURGICAL HISTORY:  MI (myocardial infarction)    HTN (hypertension)    Personal history of asbestosis    Gout    Dyslipidemia    UTI (lower urinary tract infection)    ETOH abuse    CVA (cerebral vascular accident)    History of left shoulder fracture    History of benign breast tumor        MEDICATIONS  (STANDING):  albuterol/ipratropium for Nebulization 3 milliLiter(s) Nebulizer every 6 hours    MEDICATIONS  (PRN):      FAMILY HISTORY:  No pertinent family history in first degree relatives        SOCIAL HISTORY:    [ ] Non-smoker  [ ] Smoker  [ ] Alcohol    Allergies    Toradol (Urticaria (Mild to Mod); Rash (Mild to Mod))    Intolerances    	    REVIEW OF SYSTEMS:  CONSTITUTIONAL: No fever, weight loss, or fatigue  EYES: No eye pain, visual disturbances, or discharge  ENT:  No difficulty hearing, tinnitus, vertigo; No sinus or throat pain  NECK: No pain or stiffness  RESPIRATORY: No cough, wheezing, chills or hemoptysis; No Shortness of Breath  CARDIOVASCULAR: No chest pain, palpitations, passing out, dizziness, or leg swelling  GASTROINTESTINAL: No abdominal or epigastric pain. No nausea, vomiting, or hematemesis; No diarrhea or constipation. No melena or hematochezia.  GENITOURINARY: No dysuria, frequency, hematuria, or incontinence  NEUROLOGICAL: No headaches, memory loss, loss of strength, numbness, or tremors  SKIN: No itching, burning, rashes, or lesions   LYMPH Nodes: No enlarged glands  ENDOCRINE: No heat or cold intolerance; No hair loss  MUSCULOSKELETAL: No joint pain or swelling; No muscle, back, or extremity pain  PSYCHIATRIC: No depression, anxiety, mood swings, or difficulty sleeping  HEME/LYMPH: No easy bruising, or bleeding gums  ALLERGY AND IMMUNOLOGIC: No hives or eczema	    [ ] All others negative	  [ ] Unable to obtain    PHYSICAL EXAM:  T(C): 37.2 (12-19-21 @ 10:15), Max: 38.2 (12-19-21 @ 08:00)  HR: 85 (12-19-21 @ 10:50) (83 - 709)  BP: 126/66 (12-19-21 @ 10:15) (117/52 - 126/66)  RR: 20 (12-19-21 @ 10:15) (20 - 28)  SpO2: 99% (12-19-21 @ 10:50) (94% - 100%)  Wt(kg): --  I&O's Summary      Appearance: Normal	  HEENT:   Normal oral mucosa, PERRL, EOMI	  Lymphatic: No lymphadenopathy  Cardiovascular: Normal S1 S2, No JVD, No murmurs, No edema  Respiratory: Lungs clear to auscultation	  Psychiatry: A & O x 3, Mood & affect appropriate  Gastrointestinal:  Soft, Non-tender, + BS	  Skin: No rashes, No ecchymoses, No cyanosis	  Neurologic: Non-focal  Extremities: Normal range of motion, No clubbing, cyanosis or edema  Vascular: Peripheral pulses palpable 2+ bilaterally    TELEMETRY: 	    ECG:  	  RADIOLOGY:  OTHER: 	  	  LABS:	 	    CARDIAC MARKERS:                              10.6   17.77 )-----------( 270      ( 19 Dec 2021 08:45 )             34.4     12-19    135  |  95<L>  |  37<H>  ----------------------------<  186<H>  5.4<H>   |  23  |  0.99    Ca    9.2      19 Dec 2021 08:45    TPro  7.8  /  Alb  3.4  /  TBili  0.3  /  DBili  x   /  AST  10  /  ALT  15  /  AlkPhos  108  12-19    proBNP: Serum Pro-Brain Natriuretic Peptide: 375 pg/mL (12-19 @ 08:45)    Lipid Profile:   HgA1c:   TSH:       PREVIOUS DIAGNOSTIC TESTING:    [ ] Echocardiogram:  [ ]  Catheterization:  [ ] Stress Test:         CHIEF COMPLAINT:Patient is a 86y old  Female who presents with a chief complaint of     HPI:  87 yo F from [facility], CVA with residual left weakness (~3 years ago), PEG for dysphagia, hypothyroid, COPD (on no home Oxygen), HTN, gout, p/w fever, Pt has respiratory distress, wheezing, concerning for respiratory infection, otherwise no other symptoms is reported on the chart. EMS gave solumedrol 125 through peripheral, small iv line.  pt is well known to me with hx of htn, ashd, s/p MI, cva with increasing sob on bipap in er.  can not get any hx from the pt.      PAST MEDICAL & SURGICAL HISTORY:  MI (myocardial infarction)    HTN (hypertension)    Personal history of asbestosis    Gout    Dyslipidemia    UTI (lower urinary tract infection)    ETOH abuse    CVA (cerebral vascular accident)    History of left shoulder fracture    History of benign breast tumor        MEDICATIONS  (STANDING):  albuterol/ipratropium for Nebulization 3 milliLiter(s) Nebulizer every 6 hours  noted    MEDICATIONS  (PRN):      FAMILY HISTORY:  No pertinent family history in first degree relatives        SOCIAL HISTORY:    [ ] Non-smoker  [ ] Smoker  [ ] Alcohol    Allergies    Toradol (Urticaria (Mild to Mod); Rash (Mild to Mod))    Intolerances    	    REVIEW OF SYSTEMS:  CONSTITUTIONAL: No fever, weight loss, or fatigue  EYES: No eye pain, visual disturbances, or discharge  ENT:  No difficulty hearing, tinnitus, vertigo; No sinus or throat pain  NECK: No pain or stiffness  RESPIRATORY: No cough, wheezing, chills or hemoptysis; + Shortness of Breath  CARDIOVASCULAR: No chest pain, palpitations, passing out, dizziness, or leg swelling  GASTROINTESTINAL: No abdominal or epigastric pain. No nausea, vomiting, or hematemesis; No diarrhea or constipation. No melena or hematochezia.  GENITOURINARY: No dysuria, frequency, hematuria, or incontinence  NEUROLOGICAL: No headaches, memory loss, loss of strength, numbness, or tremors  SKIN: No itching, burning, rashes, or lesions   LYMPH Nodes: No enlarged glands  ENDOCRINE: No heat or cold intolerance; No hair loss  MUSCULOSKELETAL: No joint pain or swelling; No muscle, back, or extremity pain  PSYCHIATRIC: No depression, anxiety, mood swings, or difficulty sleeping  HEME/LYMPH: No easy bruising, or bleeding gums  ALLERGY AND IMMUNOLOGIC: No hives or eczema	    [ ] All others negative	  [x ] Unable to obtain    PHYSICAL EXAM:  T(C): 37.2 (12-19-21 @ 10:15), Max: 38.2 (12-19-21 @ 08:00)  HR: 85 (12-19-21 @ 10:50) (83 - 709)  BP: 126/66 (12-19-21 @ 10:15) (117/52 - 126/66)  RR: 20 (12-19-21 @ 10:15) (20 - 28)  SpO2: 99% (12-19-21 @ 10:50) (94% - 100%)  Wt(kg): --  I&O's Summary      Appearance: on bipap  HEENT:   Normal oral mucosa, PERRL, EOMI	  Lymphatic: No lymphadenopathy  Cardiovascular: Normal S1 S2, No JVD, + murmurs, No edema  Respiratory: rhonchi  Gastrointestinal:  Soft, Non-tender, + BS	  Skin: No rashes, No ecchymoses, No cyanosis	  Extremities: Normal range of motion, No clubbing, cyanosis or edema  Vascular: Peripheral pulses palpable 2+ bilaterally    TELEMETRY: 	    ECG:  	  RADIOLOGY:  OTHER: 	  	  LABS:	 	    CARDIAC MARKERS:                              10.6   17.77 )-----------( 270      ( 19 Dec 2021 08:45 )             34.4     12-19    135  |  95<L>  |  37<H>  ----------------------------<  186<H>  5.4<H>   |  23  |  0.99    Ca    9.2      19 Dec 2021 08:45    TPro  7.8  /  Alb  3.4  /  TBili  0.3  /  DBili  x   /  AST  10  /  ALT  15  /  AlkPhos  108  12-19    proBNP: Serum Pro-Brain Natriuretic Peptide: 375 pg/mL (12-19 @ 08:45)    Lipid Profile:   HgA1c:   TSH:       PREVIOUS DIAGNOSTIC TESTING:     < from: 12 Lead ECG (12.19.21 @ 08:23) >  Diagnosis Line SINUS TACHYCARDIA  LEFT AXIS DEVIATION  INFERIOR INFARCT (CITED ON OR BEFORE 20-OCT-2018)  ABNORMAL ECG  WHEN COMPARED WITH ECG OF 28-MAY-2021 09:30,  no  SIGNIFICANT CHANGES HAVE OCCURRED    < from: Xray Chest 1 View- PORTABLE-Urgent (12.19.21 @ 09:20) >  Low lung volumes. New small left lower lung hazy opacity may represent   atelectasis versus pleural effusion. Redemonstration of right upper lung   perihilar opacity largely unchanged prior exam.    < from: Transthoracic Echocardiogram (10.17.18 @ 17:21) >  1. Calcified trileaflet aortic valve with decreased  opening. Peak transaortic valve gradient equals 22 mm Hg,  mean transaortic valve gradient equals 11 mm Hg, estimated  aortic valve area equals 1.7 sqcm (by continuity equation),  aortic valve velocity time integral equals 35 cm,  consistent with mild aortic stenosis.  2. Increased relative wall thickness with normal left  ventricular mass index, consistent with concentric left  ventricular remodeling.  3. Endocardium not well visualized; grossly normal left  ventricular systolic function.  4. The right ventricle is not well visualized; grossly  normal right ventricular systolic function.  5. Estimated right ventricular systolic pressure equals 26  mm Hg, assuming right atrial pressure equals 8 mm Hg,  consistent with normal pulmonary pressures.  6. No mobile echodensities or vegetations are seen on the  aortic or mitral valve. The tricuspid and pulmonic valves  are not well visualized. Can not exclude endocarditis.  Consider MIKALA if clinically indicated.    < from: CT Angio Chest PE Protocol w/ IV Cont (05.28.21 @ 20:12) >  1.  No pulmonary embolus, however, the mid to distal segmental and subsegmental divisions are not well evaluated.  2.  Aortic valve calcifications can occur in the setting of aortic valve disease. Echocardiography may be helpful should it not been performed recently.    .

## 2021-12-20 NOTE — DIETITIAN INITIAL EVALUATION ADULT. - SIGNS/SYMPTOMS
Currently meeting 0% of EER due to NPO status with dislodged PEG Currently meeting 0% of EER due to NPO status as evidenced by BMI >40 kg/m2 NPO

## 2021-12-20 NOTE — CONSULT NOTE ADULT - ASSESSMENT
87 yo F from [facility], CVA with residual left weakness (~3 years ago), PEG for dysphagia, hypothyroid, COPD (on no home Oxygen), HTN, gout, admitted 12/19/21 with fever and SOB  She had right simple mastectomy in 4/2019.  Any Rx after that is unknown. 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   She would ideally have received adjuvant hormonal therapy.  At this time needs management of possible perforated appendix with collection, ID, surgery and IR saw patient. She is on abx  Eventually can get bone scan and further imaging of hip as needed.   If looks metastatic may use hormonal therapy

## 2021-12-20 NOTE — DIETITIAN NUTRITION RISK NOTIFICATION - TREATMENT: THE FOLLOWING DIET HAS BEEN RECOMMENDED
In setting of possible aspiration pneumonia, recommend continuous feeding at this time (as medically feasible). Recommend feeds of Glucerna 1.2 Naseem, starting at 10 mL/hr, increasing by 10 mL/hr q 4-6 hrs to goal rate of 85 mL/hr x 18 hrs. Please see Dietitian Initial Evaluation for further information/recommendations.

## 2021-12-20 NOTE — CONSULT NOTE ADULT - SUBJECTIVE AND OBJECTIVE BOX
GENERAL SURGERY CONSULT  ========================    HPI:  87 yo F h/o CVA with residual L-sided weakness, dysphagia s/p PEG (2017), breast ca, MI, COPD presenting from rehab with fever and SOB. Patient being treated for possible aspiration pneumonia, requiring nasal cannula and BiPAP intermittently for respiratory support. Patient also bacteremic with Staph epi, methicillin resistant. Patient complained of some abdominal pain yesterday for which CT was performed.    Surgery consulted for CT showing concern for perforated appendicitis with associated 7.3cm intraabdominal collection and possible appendicolith.    PAST MEDICAL & SURGICAL HISTORY:  MI (myocardial infarction)    HTN (hypertension)    Personal history of asbestosis    Gout    Dyslipidemia    UTI (lower urinary tract infection)    ETOH abuse    CVA (cerebral vascular accident)    History of left shoulder fracture    History of benign breast tumor      MEDICATIONS  (STANDING):  acetaminophen     Tablet .. 650 milliGRAM(s) Oral once  albuterol/ipratropium for Nebulization 3 milliLiter(s) Nebulizer every 6 hours  allopurinol 100 milliGRAM(s) Oral daily  amLODIPine   Tablet 10 milliGRAM(s) Oral daily  atorvastatin 20 milliGRAM(s) Oral at bedtime  azithromycin  IVPB 500 milliGRAM(s) IV Intermittent every 24 hours  dextrose 40% Gel 15 Gram(s) Oral once  dextrose 5%. 1000 milliLiter(s) (50 mL/Hr) IV Continuous <Continuous>  dextrose 5%. 1000 milliLiter(s) (100 mL/Hr) IV Continuous <Continuous>  dextrose 50% Injectable 25 Gram(s) IV Push once  dextrose 50% Injectable 12.5 Gram(s) IV Push once  dextrose 50% Injectable 25 Gram(s) IV Push once  glucagon  Injectable 1 milliGRAM(s) IntraMuscular once  heparin   Injectable 5000 Unit(s) SubCutaneous every 12 hours  insulin lispro (ADMELOG) corrective regimen sliding scale   SubCutaneous every 6 hours  levETIRAcetam  IVPB 750 milliGRAM(s) IV Intermittent every 12 hours  levothyroxine 75 MICROGram(s) Oral daily  methylPREDNISolone sodium succinate Injectable 40 milliGRAM(s) IV Push every 8 hours  metoprolol tartrate 25 milliGRAM(s) Oral two times a day  piperacillin/tazobactam IVPB.. 3.375 Gram(s) IV Intermittent every 8 hours  sodium chloride 0.9%. 1000 milliLiter(s) (75 mL/Hr) IV Continuous <Continuous>      ALLERGIES:  Toradol (Urticaria (Mild to Mod); Rash (Mild to Mod))    ___________________________________________  REVIEW OF SYSTEMS:  All ROS negative except as per HPI.    ___________________________________________  VITALS:  Vital Signs Last 24 Hrs  T(C): 36.3 (20 Dec 2021 04:22), Max: 37.2 (19 Dec 2021 13:48)  T(F): 97.4 (20 Dec 2021 04:22), Max: 98.9 (19 Dec 2021 13:48)  HR: 96 (20 Dec 2021 11:45) (79 - 99)  BP: 138/74 (20 Dec 2021 04:22) (123/51 - 153/94)  BP(mean): 65 (19 Dec 2021 13:48) (65 - 65)  RR: 18 (20 Dec 2021 04:22) (16 - 28)  SpO2: 92% (20 Dec 2021 11:45) (92% - 100%)    CAPILLARY BLOOD GLUCOSE  POCT Blood Glucose.: 262 mg/dL (20 Dec 2021 12:05)    I&O's Detail    19 Dec 2021 07:01  -  20 Dec 2021 07:00  --------------------------------------------------------  IN:    IV PiggyBack: 300 mL  Total IN: 300 mL    OUT:    Voided (mL): 600 mL  Total OUT: 600 mL    Total NET: -300 mL      20 Dec 2021 07:01  -  20 Dec 2021 12:23  --------------------------------------------------------  IN:    IV PiggyBack: 100 mL  Total IN: 100 mL    OUT:    Voided (mL): 700 mL  Total OUT: 700 mL    Total NET: -600 mL      PHYSICAL EXAM:  General: AAOx3, no acute distress.  Respiratory: on 6L NC with breathing treatment ongoing  Abdomen: soft, softly distended, mild periumbilical tenderness, no rebound or guarding, LUQ PEG in place with mild erythema and signs of excoriation over insertion site  Extremities: LLE contracted, LUE weakness    ____________________________________________  LABS:  CBC Full  -  ( 20 Dec 2021 07:08 )  WBC Count : 17.06 K/uL  RBC Count : 3.08 M/uL  Hemoglobin : 10.0 g/dL  Hematocrit : 30.9 %  Platelet Count - Automated : 265 K/uL  Mean Cell Volume : 100.3 fl  Mean Cell Hemoglobin : 32.5 pg  Mean Cell Hemoglobin Concentration : 32.4 gm/dL  Auto Neutrophil # : x  Auto Lymphocyte # : x  Auto Monocyte # : x  Auto Eosinophil # : x  Auto Basophil # : x  Auto Neutrophil % : x  Auto Lymphocyte % : x  Auto Monocyte % : x  Auto Eosinophil % : x  Auto Basophil % : x    12-20    136  |  98  |  30<H>  ----------------------------<  256<H>  4.4   |  23  |  0.76    Ca    9.4      20 Dec 2021 07:05  Phos  3.4     12-20  Mg     2.6     12-20    TPro  7.5  /  Alb  3.7  /  TBili  0.2  /  DBili  x   /  AST  8<L>  /  ALT  13  /  AlkPhos  106  12-20    LIVER FUNCTIONS - ( 20 Dec 2021 07:05 )  Alb: 3.7 g/dL / Pro: 7.5 g/dL / ALK PHOS: 106 U/L / ALT: 13 U/L / AST: 8 U/L / GGT: x             Urinalysis Basic - ( 19 Dec 2021 08:46 )    Color: Yellow / Appearance: Clear / S.021 / pH: x  Gluc: x / Ketone: Negative  / Bili: Negative / Urobili: 2 mg/dL   Blood: x / Protein: 30 mg/dL / Nitrite: Negative   Leuk Esterase: Negative / RBC: 1 /hpf / WBC 1 /HPF   Sq Epi: x / Non Sq Epi: 4 /hpf / Bacteria: Negative    ____________________________________________  RADIOLOGY & ADDITIONAL STUDIES:    < from: CT Abdomen and Pelvis w/ IV Cont (21 @ 21:40) >  ACC: [id number] EXAM:  CT ABDOMEN AND PELVIS IC                        ACC: [id number] EXAM:  CT CHEST IC                          PROCEDURE DATE:  2021      INTERPRETATION:  CLINICAL INFORMATION: Fever, shortness of breath,   abdominal pain    COMPARISON: CT chest 2021, CT abdomen and pelvis 2017    CONTRAST/COMPLICATIONS:  IV Contrast: Omnipaque 350  90 cc administered   10 cc discarded  Oral Contrast: NONE  Complications: None reported at time of study completion    PROCEDURE:  CT of the Chest, Abdomen and Pelvis was performed.  Sagittal and coronal reformats were performed.    FINDINGS:    CHEST:  Motion degradation, particularly at the lung bases.    LUNGS AND LARGE AIRWAYS: Debris within the left lobar and more distal   airways of the left lower lobe. There is likely some debris within the   right lower lobe airways as well, though motion degradation limits   evaluation. Complete atelectasis of the left lower lobe. Subsegmental   atelectatic changes within the left upper lobe and dependent portions of   the right lung.  PLEURA: No pleural effusion. No pneumothorax.  VESSELS: Atherosclerotic changes. Coronary artery calcifications.  HEART: Heart size is normal. Aortic valve calcification. No pericardial   effusion.  MEDIASTINUM AND GIOVANA: No lymphadenopathy.  CHEST WALL AND LOWER NECK: Right mastectomy.    ABDOMEN AND PELVIS:  Motion degradation, most pronounced in the upper abdomen.    LIVER: Normal size and morphology. Subcentimeter calcified granuloma in   the right lobe. No suspicious liver lesion. Hepatic and portal veins are   patent.  BILE DUCTS: Hyperdensity in the region of the distal common bile duct   (series 3 image 130), which may reflect choledocholithiasis or adjacent   pancreatic parenchymal calcification. Bile ducts are of normal caliber,   with the common bile duct measuring up to 5 mm in diameter.  GALLBLADDER: Cholelithiasis.  SPLEEN: Within normal limits.  PANCREAS: Hypodense cystic appearing lesion measuring 1.0 cm in the   pancreatic tail (series 3 image 108), which is new since 2017, but is   unchanged since 2021. No main pancreatic duct dilation.  ADRENALS: Within normal limits.  KIDNEYS/URETERS: Within normal limits.    BLADDER: Within normal limits.  REPRODUCTIVEORGANS: Thickening of the endometrial complex at the fundus,   measuring up to 1.6 cm.    BOWEL:  Fluid collection within the right lower quadrant measuring 7.3 x 3.9 x   5.7 cm (series 3 image 209) with surrounding fatty infiltration. The   collection is associated with the tip of the appendix, which is   indistinct, with findings concerning for perforated appendicitis. No   pneumoperitoneum. There is a punctate 1 to 2 mm calcific density within   the collection (series 3 image 12), which may represent an appendicolith.    Small hiatal hernia. Percutaneous gastrostomy tube, the balloon located   within the gastric antrum. Duodenal diverticulum arising from the third   portion of the duodenum. Colonic diverticulosis. No bowel obstruction.    PERITONEUM: Right lower quadrant fluid collection, as described above.  VESSELS: Atherosclerotic changes.  RETROPERITONEUM/LYMPH NODES: No lymphadenopathy.  ABDOMINAL WALL: Small fat-containing umbilical hernia on a background of   diastases recti. Percutaneous gastrostomy tube.  BONES: Fixation hardware of the proximal left humerus. Degenerative   changes. Osseous demineralization. Mild to moderate loss of height of the   T11 and L1 vertebral bodies, unchanged since 2017.    Soft tissue density measuring 2.8 x 1.8 cm centered within the right   posterior medial acetabulum with associated erosion of the cortex (series   4 image 794). Findings are new since 2017.    IMPRESSION:  Findings concerning for perforated appendicitis with a right lower   quadrant collection associated with the appendiceal tip.    Complete atelectasis of the left lower lobe with debris occupying the   left lower lobe airways. An underlying pneumonia is not excluded.    Indeterminate lesion within the right acetabulum with associated osseous   erosion. Malignancy is not excluded.    Cholelithiasis and suspected choledocholithiasis. No biliary duct   dilation.    Thickening of the endometrial complex. Pelvic ultrasound may be performed   for further assessment.    Pancreatic tail lesion measuring 1 cm, possibly a side branch IPMN. This   can be further assessed with nonemergent contrast enhanced abdominal   MRI/MRCP.    < end of copied text >

## 2021-12-20 NOTE — DIETITIAN INITIAL EVALUATION ADULT. - ENTERAL
In setting of possible aspiration pneumonia, recommend continuous feeding at this time. Recommend feeds of Glucerna 1.2 Naseem, starting at 10 mL/hr, increasing by 10 mL/hr q 4-6 hrs to goal rate of 85 mL/hr. At goal, regimen provides 1530 mL total volume, 1836 naseem, 91.8 g protein, 1232 mL water. Meets 15-20 naseem/kg, 0.8-1 g protein/kg based on dosing weight of 109.5 kg. Meets 122% RDIs. Defer free water to team. In setting of possible aspiration pneumonia, recommend continuous feeding at this time. Recommend feeds of Glucerna 1.2 Naseem, starting at 10 mL/hr, increasing by 10 mL/hr q 4-6 hrs to goal rate of 85 mL/hr x 18 hrs. At goal, regimen provides 1530 mL total volume, 1836 naseem, 91.8 g protein, 1232 mL water. Meets 17 naseem/kg, 0.8 g protein/kg based on dosing weight of 109.5 kg. Meets 122% RDIs. Defer free water to team. In setting of possible aspiration pneumonia, recommend continuous feeding at this time (once medically feasible). Recommend feeds of Glucerna 1.2 Naseem, starting at 10 mL/hr, increasing by 10 mL/hr q 4-6 hrs to goal rate of 85 mL/hr x 18 hrs. At goal, regimen provides 1530 mL total volume, 1836 naseem, 91.8 g protein, 1232 mL water. Meets 17 naseem/kg, 0.8 g protein/kg based on dosing weight of 109.5 kg. Meets 122% RDIs. Defer free water to team.

## 2021-12-20 NOTE — DIETITIAN INITIAL EVALUATION ADULT. - ORAL INTAKE PTA/DIET HISTORY
Please note, pt is not a great historian and RD couldn't obtain medical history from [facility]. Pt reports following pureed/nectar-thick consistencies at [facility]. Per diet recall, does not consume solid foods - mainly juices and ice cream. Pt also state receiving TF - per previous RD's note on 5/29/2021, pt was ordered EN from 5pm-8am, receiving 900mL formula daily (1350cal, 74 g protein) + 250mL free water flush q 4 hours. No known food allergies. Denies use of oral nutritional supplements. Please note, pt is not a great historian with limited medical history from [facility]. Pt reports following pureed/nectar-thick consistencies at [facility]. Per diet recall, does not consume solid foods - mainly juices and ice cream. Pt also state receiving TF - per previous RD's note on 5/29/2021, pt was ordered EN from 5pm-8am, receiving 900mL formula daily (1350cal, 74 g protein) + 250mL free water flush q 4 hours. No known food allergies. Denies use of oral nutritional supplements. Pt poor historian; limited medical history available from rehab. Pt reports following pureed/nectar-thick consistencies at [facility]. Per diet recall, does not consume solid foods - mainly juices and ice cream. Pt also state receiving TF - per previous RD's note on 5/29/2021, pt was ordered EN from 5pm-8am, receiving 900mL formula daily (1350cal, 74 g protein) + 250mL free water flush q 4 hours. No known food allergies. Denies use of oral nutritional supplements.

## 2021-12-20 NOTE — DIETITIAN INITIAL EVALUATION ADULT. - OTHER INFO
Denies chewing problems. Per EMR, pt has dysphagia - ordered for NPO with tube feeds (bolus; Jevity 1.2; 250mL/hr q 6 hours up to 1000mL/24 hrs), however, PEG is currently dislodged. No N/V. Unable to obtain information regarding other GI distresses and last BM due to pt's SOB. Per pt, has been gaining ~20 pounds every time she gets weighed: 178 pounds during outpatient doctor's visit before last admission on 5/28/2021 and 200 pounds during last admission, however, per previous RD's note, dosing weight of 250 pounds - ?weight accuracies due to pt's poor historian. Noted current dosing weight of 241.6 pounds. Unable to obtain current weight as bed scale is not working - will continue to monitor. Pt complaints of pain - currently on pain regimen. Labs reviewed - BUN trending down and elevated glucose trending up since yesterday 12/19 + per previous RD's note, A1c of 5.7% (2/1/2021), indicating blood glucose management. Denies chewing problems. Per EMR, pt has dysphagia - ordered for NPO with tube feeds (bolus; Jevity 1.2; 250mL/hr q 6 hours up to 1000mL/24 hrs), however, PEG is currently dislodged. No N/V. Unable to obtain information regarding other GI distresses due to pt's SOB. Per flowsheet, last BM yesterday 12/19. Per pt, has been gaining ~20 pounds every time she gets weighed: 178 pounds during outpatient doctor's visit before last admission on 5/28/2021 and 200 pounds during last admission, however, per previous RD's note, dosing weight of 250 pounds - ?weight accuracies due to pt's poor historian. Noted current dosing weight of 241.6 pounds. Unable to obtain current weight as bed scale is not working - will continue to monitor. Pt complaints of pain - currently on pain regimen. Labs reviewed - elevated BUN trending down and elevated glucose trending up since yesterday 12/19 + per previous RD's note, A1c of 5.7% (2/1/2021), indicating blood glucose management.

## 2021-12-20 NOTE — DIETITIAN INITIAL EVALUATION ADULT. - PERTINENT LABORATORY DATA
12/20   Hb 10 <L>  Hct 30.9 <L>   Glucose 256 <H>   Sodium 136 <WDL>  Potassium 4.4 <WDL>  Phosphorus 3.4 <WDL>  BUN 30 <H>  Creatinine 0.76 <WDl>  eGFR 72<WDL>  AST/SGOT 8 <L>    12/19   BUN 37<H>  [id number] <H> 12/20   Hb 10 <L>  Hct 30.9 <L>   Glucose 256 <H>   Sodium 136 <WDL>  Potassium 4.4 <WDL>  Phosphorus 3.4 <WDL>  BUN 30 <H>  Creatinine 0.76 <WDL>  eGFR 72<WDL>  AST/SGOT 8 <L>    12/19   BUN 37<H>  [id number] <H>

## 2021-12-20 NOTE — CHART NOTE - NSCHARTNOTEFT_GEN_A_CORE
86 F CVA w/ PEG for dypshagia, p/w fever and shortness of breath, found to have RLQ fluid possible perforated appendicitis on CT.    Imaging reviewed: at this time limited percutaneous window and fluid appears to be loculated phlegmon rather than well formed collection    likely will require eventual drainage, however at this time, collection not fully formed.     Repeat imaging Wed - CT pelvis with IV contrast, NPO AMN in preparation for possible drainage Thursday pending Wed CT. 86 F CVA w/ PEG for dypshagia, p/w fever and shortness of breath, found to have RLQ fluid possible perforated appendicitis on CT.    Imaging reviewed: at this time limited percutaneous window and fluid appears to be loculated phlegmon rather than well formed collection    likely will require eventual drainage, however at this time, collection not fully formed.     Repeat imaging Wed - CT pelvis with IV contrast, NPO Tue midnight in preparation for possible drainage Wednesday afternoon pending CT.

## 2021-12-20 NOTE — PROGRESS NOTE ADULT - PROBLEM SELECTOR PLAN 2
Concern for fever w/ hypoxia, sob w/ CT chest and CXR findings concerning for PNA, asp vs CAP, likely cause of hypoxia  - Will treat w/ zosyn for now  - chest PT, airway clearance  - BiPAP if worsening, stable VBG off BiPAP  - f/u urine legionella, can dc azithro if neg  - Bcx growing gram pos cocci, coag neg  - f/u TTE to r/o infective endocarditis  - f/u repeat cultures

## 2021-12-20 NOTE — PROGRESS NOTE ADULT - SUBJECTIVE AND OBJECTIVE BOX
CARDIOLOGY     PROGRESS  NOTE   ________________________________________________    CHIEF COMPLAINT:Patient is a 86y old  Female who presents with a chief complaint of fevers/sob (20 Dec 2021 07:13)  sob.  	  REVIEW OF SYSTEMS:  CONSTITUTIONAL: No fever, weight loss, or fatigue  EYES: No eye pain, visual disturbances, or discharge  ENT:  No difficulty hearing, tinnitus, vertigo; No sinus or throat pain  NECK: No pain or stiffness  RESPIRATORY: No cough, wheezing, chills or hemoptysis;+ Shortness of Breath  CARDIOVASCULAR: No chest pain, palpitations, passing out, dizziness, or leg swelling  GASTROINTESTINAL: No abdominal or epigastric pain. No nausea, vomiting, or hematemesis; No diarrhea or constipation. No melena or hematochezia.  GENITOURINARY: No dysuria, frequency, hematuria, or incontinence  NEUROLOGICAL: No headaches, memory loss, loss of strength, numbness, or tremors  SKIN: No itching, burning, rashes, or lesions   LYMPH Nodes: No enlarged glands  ENDOCRINE: No heat or cold intolerance; No hair loss  MUSCULOSKELETAL: No joint pain or swelling; No muscle, back, or extremity pain  PSYCHIATRIC: No depression, anxiety, mood swings, or difficulty sleeping  HEME/LYMPH: No easy bruising, or bleeding gums  ALLERGY AND IMMUNOLOGIC: No hives or eczema	    [ ] All others negative	  [ x] Unable to obtain    PHYSICAL EXAM:  T(C): 36.3 (12-20-21 @ 04:22), Max: 37.6 (12-19-21 @ 09:30)  HR: 90 (12-20-21 @ 05:21) (79 - 99)  BP: 138/74 (12-20-21 @ 04:22) (118/60 - 153/94)  RR: 18 (12-20-21 @ 04:22) (16 - 28)  SpO2: 95% (12-20-21 @ 05:21) (94% - 100%)  Wt(kg): --  I&O's Summary    19 Dec 2021 07:01  -  20 Dec 2021 07:00  --------------------------------------------------------  IN: 300 mL / OUT: 600 mL / NET: -300 mL        Appearance: Normal	  HEENT:   Normal oral mucosa, PERRL, EOMI	  Lymphatic: No lymphadenopathy  Cardiovascular: Normal S1 S2, No JVD, + murmurs, No edema  Respiratory: Lungs clear to auscultation	  Gastrointestinal:  Soft, Non-tender, + BS	  Skin: No rashes, No ecchymoses, No cyanosis	  Neurologic: Non-focal  Extremities: Normal range of motion, No clubbing, cyanosis or edema  Vascular: Peripheral pulses palpable 2+ bilaterally    MEDICATIONS  (STANDING):  albuterol/ipratropium for Nebulization 3 milliLiter(s) Nebulizer every 6 hours  allopurinol 100 milliGRAM(s) Oral daily  amLODIPine   Tablet 10 milliGRAM(s) Oral daily  atorvastatin 20 milliGRAM(s) Oral at bedtime  azithromycin  IVPB 500 milliGRAM(s) IV Intermittent every 24 hours  cefTRIAXone   IVPB 1000 milliGRAM(s) IV Intermittent every 24 hours  heparin   Injectable 5000 Unit(s) SubCutaneous every 12 hours  levETIRAcetam  IVPB 750 milliGRAM(s) IV Intermittent every 12 hours  levothyroxine 75 MICROGram(s) Oral daily  methylPREDNISolone sodium succinate Injectable 40 milliGRAM(s) IV Push every 8 hours  metoprolol tartrate 25 milliGRAM(s) Oral two times a day  vancomycin  IVPB 1500 milliGRAM(s) IV Intermittent every 12 hours      TELEMETRY: 	    ECG:  	  RADIOLOGY:  OTHER: 	  	  LABS:	 	    CARDIAC MARKERS:                                10.0   17.06 )-----------( 265      ( 20 Dec 2021 07:08 )             30.9     12-20    136  |  98  |  30<H>  ----------------------------<  256<H>  4.4   |  23  |  0.76    Ca    9.4      20 Dec 2021 07:05  Phos  3.4     12-20  Mg     2.6     12-20    TPro  7.5  /  Alb  3.7  /  TBili  0.2  /  DBili  x   /  AST  8<L>  /  ALT  13  /  AlkPhos  106  12-20    proBNP: Serum Pro-Brain Natriuretic Peptide: 375 pg/mL (12-19 @ 08:45)    Lipid Profile:   HgA1c:   TSH:       < from: Xray Chest 1 View- PORTABLE-Urgent (12.19.21 @ 09:20) >  Low lung volumes. New small left lower lung hazy opacity may represent   atelectasis versus pleural effusion. Redemonstration of right upper lung   perihilar opacity largely unchanged prior exam.    Culture - Blood (12.19.21 @ 14:24)    Gram Stain:   Growth in aerobic bottle: Gram Positive Cocci in Clusters    Specimen Source: .Blood Blood    Culture Results:   Growth in aerobic bottle: Gram Positive Cocci in Clusters  ***Blood Panel PCR results on this specimen are available  approximately 3 hours after the Gram stain result.***  Gram stain, PCR, and/or culture results may not always  correspond due to difference in methodologies.  ************************************************************  This PCR assay was performed by multiplex PCR. This  Assay tests for 66 bacterial and resistance gene targets.  Please refer to the [facility] test directory  at [email or web address] for details.        Assessment and plan  ---------------------------  87 yo F from [place] [facility], CVA with residual left weakness (~3 years ago), PEG for dysphagia, hypothyroid, COPD (on no home Oxygen), HTN, gout, p/w fever, Pt has respiratory distress, wheezing, concerning for respiratory infection, otherwise no other symptoms is reported on the chart. EMS gave solumedrol 125 through peripheral, small iv line.  pt is well known to me with hx of htn, ashd, s/p MI, cva with increasing sob on bipap in er.  can not get any hx from the pt.  sob ?respiratory failure ?sec to pneumoniae  ?pleural effusion, check ct chest no contrast  continue bp meds  dvt prophylaxis  abx  awaiting covid test  will consider to possible repeat echo  duoneb  dvt prophylaxis  echo  dvt prophylaxis  pulmonary eval  + one set BC/ G+ cocci on iv vanco    	                    CARDIOLOGY     PROGRESS  NOTE   ________________________________________________    CHIEF COMPLAINT:Patient is a 86y old  Female who presents with a chief complaint of fevers/sob (20 Dec 2021 07:13)  sob.  	  REVIEW OF SYSTEMS:  CONSTITUTIONAL: No fever, weight loss, or fatigue  EYES: No eye pain, visual disturbances, or discharge  ENT:  No difficulty hearing, tinnitus, vertigo; No sinus or throat pain  NECK: No pain or stiffness  RESPIRATORY: No cough, wheezing, chills or hemoptysis;+ Shortness of Breath  CARDIOVASCULAR: No chest pain, palpitations, passing out, dizziness, or leg swelling  GASTROINTESTINAL: No abdominal or epigastric pain. No nausea, vomiting, or hematemesis; No diarrhea or constipation. No melena or hematochezia.  GENITOURINARY: No dysuria, frequency, hematuria, or incontinence  NEUROLOGICAL: No headaches, memory loss, loss of strength, numbness, or tremors  SKIN: No itching, burning, rashes, or lesions   LYMPH Nodes: No enlarged glands  ENDOCRINE: No heat or cold intolerance; No hair loss  MUSCULOSKELETAL: No joint pain or swelling; No muscle, back, or extremity pain  PSYCHIATRIC: No depression, anxiety, mood swings, or difficulty sleeping  HEME/LYMPH: No easy bruising, or bleeding gums  ALLERGY AND IMMUNOLOGIC: No hives or eczema	    [ ] All others negative	  [ x] Unable to obtain    PHYSICAL EXAM:  T(C): 36.3 (12-20-21 @ 04:22), Max: 37.6 (12-19-21 @ 09:30)  HR: 90 (12-20-21 @ 05:21) (79 - 99)  BP: 138/74 (12-20-21 @ 04:22) (118/60 - 153/94)  RR: 18 (12-20-21 @ 04:22) (16 - 28)  SpO2: 95% (12-20-21 @ 05:21) (94% - 100%)  Wt(kg): --  I&O's Summary    19 Dec 2021 07:01  -  20 Dec 2021 07:00  --------------------------------------------------------  IN: 300 mL / OUT: 600 mL / NET: -300 mL        Appearance: Normal	  HEENT:   Normal oral mucosa, PERRL, EOMI	  Lymphatic: No lymphadenopathy  Cardiovascular: Normal S1 S2, No JVD, + murmurs, No edema  Respiratory: Lungs clear to auscultation	  Gastrointestinal:  Soft, Non-tender, + BS, +distended  Skin: No rashes, No ecchymoses, No cyanosis	  Neurologic: Non-focal  Extremities: Normal range of motion, No clubbing, cyanosis or edema  Vascular: Peripheral pulses palpable 2+ bilaterally    MEDICATIONS  (STANDING):  albuterol/ipratropium for Nebulization 3 milliLiter(s) Nebulizer every 6 hours  allopurinol 100 milliGRAM(s) Oral daily  amLODIPine   Tablet 10 milliGRAM(s) Oral daily  atorvastatin 20 milliGRAM(s) Oral at bedtime  azithromycin  IVPB 500 milliGRAM(s) IV Intermittent every 24 hours  cefTRIAXone   IVPB 1000 milliGRAM(s) IV Intermittent every 24 hours  heparin   Injectable 5000 Unit(s) SubCutaneous every 12 hours  levETIRAcetam  IVPB 750 milliGRAM(s) IV Intermittent every 12 hours  levothyroxine 75 MICROGram(s) Oral daily  methylPREDNISolone sodium succinate Injectable 40 milliGRAM(s) IV Push every 8 hours  metoprolol tartrate 25 milliGRAM(s) Oral two times a day  vancomycin  IVPB 1500 milliGRAM(s) IV Intermittent every 12 hours      TELEMETRY: 	    ECG:  	  RADIOLOGY:  OTHER: 	  	  LABS:	 	    CARDIAC MARKERS:                                10.0   17.06 )-----------( 265      ( 20 Dec 2021 07:08 )             30.9     12-20    136  |  98  |  30<H>  ----------------------------<  256<H>  4.4   |  23  |  0.76    Ca    9.4      20 Dec 2021 07:05  Phos  3.4     12-20  Mg     2.6     12-20    TPro  7.5  /  Alb  3.7  /  TBili  0.2  /  DBili  x   /  AST  8<L>  /  ALT  13  /  AlkPhos  106  12-20    proBNP: Serum Pro-Brain Natriuretic Peptide: 375 pg/mL (12-19 @ 08:45)    Lipid Profile:   HgA1c:   TSH:       < from: Xray Chest 1 View- PORTABLE-Urgent (12.19.21 @ 09:20) >  Low lung volumes. New small left lower lung hazy opacity may represent   atelectasis versus pleural effusion. Redemonstration of right upper lung   perihilar opacity largely unchanged prior exam.    Culture - Blood (12.19.21 @ 14:24)    Gram Stain:   Growth in aerobic bottle: Gram Positive Cocci in Clusters    Specimen Source: .Blood Blood    Culture Results:   Growth in aerobic bottle: Gram Positive Cocci in Clusters  ***Blood Panel PCR results on this specimen are available  approximately 3 hours after the Gram stain result.***  Gram stain, PCR, and/or culture results may not always  correspond due to difference in methodologies.  ************************************************************  This PCR assay was performed by multiplex PCR. This  Assay tests for 66 bacterial and resistance gene targets.  Please refer to the [facility] test directory  at [email or web address] for details.        Assessment and plan  ---------------------------  85 yo F from [facility] rehab, CVA with residual left weakness (~3 years ago), PEG for dysphagia, hypothyroid, COPD (on no home Oxygen), HTN, gout, p/w fever, Pt has respiratory distress, wheezing, concerning for respiratory infection, otherwise no other symptoms is reported on the chart. EMS gave solumedrol 125 through peripheral, small iv line.  pt is well known to me with hx of htn, ashd, s/p MI, cva with increasing sob on bipap in er.  can not get any hx from the pt.  sob ?respiratory failure ?sec to pneumoniae  ?pleural effusion, check ct chest no contrast  continue bp meds  dvt prophylaxis  abx  awaiting covid test  will consider to possible repeat echo  duoneb  dvt prophylaxis  echo  dvt prophylaxis  pulmonary eval  + one set BC/ G+ cocci on iv vanco  awaiting ct scan of abdomen and pelvis

## 2021-12-20 NOTE — PROGRESS NOTE ADULT - PROBLEM SELECTOR PLAN 3
H/o COPD, no home O2, now presenting w/ hypoxia, concern for COPD exacerbation  - Will treat w/ salumedrol for 5 days  - IV abx  - airway clearance and chest PT H/o COPD, no home O2, now presenting w/ hypoxia, concern for COPD exacerbation  - Will treat w/ prednisone for 3 more days   - c/w zosyn  - airway clearance and chest PT

## 2021-12-20 NOTE — DIETITIAN INITIAL EVALUATION ADULT. - PERTINENT MEDS FT
Heparin   Abx regimen (i.e. Vancomycin, Zithromax, Rocephin)   Acetaminophen   Duoneb   Norvasc  Lipitor  Lopressor   Keppra   Synthroid   Solu-Medrol Heparin   Abx regimen (i.e. Vancomycin, Zithromax, Rocephin)   Acetaminophen   Duoneb   Norvasc  Lipitor  Lopressor   Keppra   Synthroid   Solu-Medrol  Admelog (corrective regimen sliding scale)

## 2021-12-20 NOTE — CONSULT NOTE ADULT - ASSESSMENT
87 yo F h/o CVA with residual L-sided weakness, dysphagia s/p PEG (2017), breast ca, MI, COPD presenting from rehab with fever and SOB with Staph epi bacteremia, currently being treated for pneumonia. Surgery consulted for CT showing for perforated appendicitis with 7.3cm intraabdominal collection. Patient hemodynamically stable, however with persistent oxygen requirements.    Plan/Recommendations:  - To be discussed with attending.    SULY Loyd, PGY2  [id number] 85 yo F h/o CVA with residual L-sided weakness, dysphagia s/p PEG (2017), breast ca, MI, COPD presenting from rehab with fever and SOB with Staph epi bacteremia, currently being treated for pneumonia. Surgery consulted for CT showing for perforated appendicitis with 7.3cm intraabdominal collection. Patient hemodynamically stable, however with persistent oxygen requirements.    Plan/Recommendations:  - No acute surgical intervention given perforated appendicitis with large associated abscess  - Recommend IR drainage of collection  - Continue antibiotics    SULY Loyd, PGY2  [place] [facility] [id number]  87 yo F h/o CVA with residual L-sided weakness, dysphagia s/p PEG (2017), breast ca, MI, COPD presenting from rehab with fever and SOB with Staph epi bacteremia, currently being treated for pneumonia. Surgery consulted for CT showing for perforated appendicitis with 7.3cm intraabdominal collection. Patient hemodynamically stable, however with persistent oxygen requirements.    Plan/Recommendations:  - No acute surgical intervention given perforated appendicitis with large associated abscess  - Recommend IR drainage of collection  - Continue antibiotics    D/w Dr. Day.    SULY Loyd, PGY2  [place] [facility] [id number]

## 2021-12-20 NOTE — DIETITIAN INITIAL EVALUATION ADULT. - REASON INDICATOR FOR ASSESSMENT
Consult for tube feed   Source: EMR, previous dietitian's notes, nurse and pt Consult for tube feeding  Source: EMR, previous dietitian's notes, nurse and pt

## 2021-12-20 NOTE — PROGRESS NOTE ADULT - SUBJECTIVE AND OBJECTIVE BOX
afberile    REVIEW OF SYSTEMS:  GEN: no fever,    no chills  RESP: no SOB,   no cough  CVS: no chest pain,   no palpitations  GI: no abdominal pain,   no nausea,   no vomiting,   no constipation,   no diarrhea  : no dysuria,   no frequency  NEURO: no headache,   no dizziness  PSYCH: no depression,   not anxious  Derm : no rash    MEDICATIONS  (STANDING):  albuterol/ipratropium for Nebulization 3 milliLiter(s) Nebulizer every 6 hours  allopurinol 100 milliGRAM(s) Oral daily  amLODIPine   Tablet 10 milliGRAM(s) Oral daily  atorvastatin 20 milliGRAM(s) Oral at bedtime  azithromycin  IVPB 500 milliGRAM(s) IV Intermittent every 24 hours  cefTRIAXone   IVPB 1000 milliGRAM(s) IV Intermittent every 24 hours  dextrose 40% Gel 15 Gram(s) Oral once  dextrose 5%. 1000 milliLiter(s) (50 mL/Hr) IV Continuous <Continuous>  dextrose 5%. 1000 milliLiter(s) (100 mL/Hr) IV Continuous <Continuous>  dextrose 50% Injectable 25 Gram(s) IV Push once  dextrose 50% Injectable 12.5 Gram(s) IV Push once  dextrose 50% Injectable 25 Gram(s) IV Push once  glucagon  Injectable 1 milliGRAM(s) IntraMuscular once  heparin   Injectable 5000 Unit(s) SubCutaneous every 12 hours  insulin lispro (ADMELOG) corrective regimen sliding scale   SubCutaneous every 6 hours  levETIRAcetam  IVPB 750 milliGRAM(s) IV Intermittent every 12 hours  levothyroxine 75 MICROGram(s) Oral daily  methylPREDNISolone sodium succinate Injectable 40 milliGRAM(s) IV Push every 8 hours  metoprolol tartrate 25 milliGRAM(s) Oral two times a day  vancomycin  IVPB 1500 milliGRAM(s) IV Intermittent every 12 hours    MEDICATIONS  (PRN):  acetaminophen     Tablet .. 650 milliGRAM(s) Oral every 6 hours PRN Temp greater or equal to 38C (100.4F), Mild Pain (1 - 3)  melatonin 3 milliGRAM(s) Oral at bedtime PRN Insomnia      Vital Signs Last 24 Hrs  T(C): 36.3 (20 Dec 2021 04:22), Max: 37.6 (19 Dec 2021 09:30)  T(F): 97.4 (20 Dec 2021 04:22), Max: 99.7 (19 Dec 2021 09:30)  HR: 90 (20 Dec 2021 05:21) (79 - 99)  BP: 138/74 (20 Dec 2021 04:22) (118/60 - 153/94)  BP(mean): 65 (19 Dec 2021 13:48) (65 - 76)  RR: 18 (20 Dec 2021 04:22) (16 - 28)  SpO2: 95% (20 Dec 2021 05:21) (94% - 100%)  CAPILLARY BLOOD GLUCOSE        I&O's Summary    19 Dec 2021 07:01  -  20 Dec 2021 07:00  --------------------------------------------------------  IN: 300 mL / OUT: 600 mL / NET: -300 mL        PHYSICAL EXAM:  HEAD:  Atraumatic, Normocephalic  NECK: Supple, No   JVD  CHEST/LUNG:   no     rales,     no,    rhonchi  HEART: Regular rate and rhythm;         murmur  ABDOMEN: Soft, Nontender, ;   EXTREMITIES:    no    edema  NEUROLOGY:  alert    LABS:                        10.0   17.06 )-----------( 265      ( 20 Dec 2021 07:08 )             30.9     12-20    136  |  98  |  30<H>  ----------------------------<  256<H>  4.4   |  23  |  0.76    Ca    9.4      20 Dec 2021 07:05  Phos  3.4     12-20  Mg     2.6     12-20    TPro  7.5  /  Alb  3.7  /  TBili  0.2  /  DBili  x   /  AST  8<L>  /  ALT  13  /  AlkPhos  106  12-20          Urinalysis Basic - ( 19 Dec 2021 08:46 )    Color: Yellow / Appearance: Clear / S.021 / pH: x  Gluc: x / Ketone: Negative  / Bili: Negative / Urobili: 2 mg/dL   Blood: x / Protein: 30 mg/dL / Nitrite: Negative   Leuk Esterase: Negative / RBC: 1 /hpf / WBC 1 /HPF   Sq Epi: x / Non Sq Epi: 4 /hpf / Bacteria: Negative          12-19 @ 19:02  4.5  180            Culture - Blood (collected 21 @ 14:24)  Source: .Blood Blood  Gram Stain (21 @ 05:14):    Growth in aerobic bottle: Gram Positive Cocci in Clusters  Preliminary Report (21 @ 05:15):    Growth in aerobic bottle: Gram Positive Cocci in Clusters    ***Blood Panel PCR results on this specimen are available    approximately 3 hours after the Gram stain result.***    Gram stain, PCR, and/or culture results may not always    correspond due to difference in methodologies.    ************************************************************    This PCR assay was performed by multiplex PCR. This    Assay tests for 66 bacterial and resistance gene targets.    Please refer to the [facility] test directory    at [email or web address] for details.  Organism: Blood Culture PCR (21 @ 09:00)  Organism: Blood Culture PCR (21 @ 09:00)      -  Staphylococcus epidermidis, Methicillin resistant: Detec      Method Type: PCR        Consultant(s) Notes Reviewed:      Care Discussed with Consultants/Other Providers:       afberile    REVIEW OF SYSTEMS:  GEN: no fever,    no chills  RESP: no SOB,   no cough  CVS: no chest pain,   no palpitations  GI: no abdominal pain,   no nausea,   no vomiting,   no constipation,   no diarrhea  : no dysuria,   no frequency  NEURO: no headache,   no dizziness  PSYCH: no depression,   not anxious  Derm : no rash    MEDICATIONS  (STANDING):  albuterol/ipratropium for Nebulization 3 milliLiter(s) Nebulizer every 6 hours  allopurinol 100 milliGRAM(s) Oral daily  amLODIPine   Tablet 10 milliGRAM(s) Oral daily  atorvastatin 20 milliGRAM(s) Oral at bedtime  azithromycin  IVPB 500 milliGRAM(s) IV Intermittent every 24 hours  cefTRIAXone   IVPB 1000 milliGRAM(s) IV Intermittent every 24 hours  dextrose 40% Gel 15 Gram(s) Oral once  dextrose 5%. 1000 milliLiter(s) (50 mL/Hr) IV Continuous <Continuous>  dextrose 5%. 1000 milliLiter(s) (100 mL/Hr) IV Continuous <Continuous>  dextrose 50% Injectable 25 Gram(s) IV Push once  dextrose 50% Injectable 12.5 Gram(s) IV Push once  dextrose 50% Injectable 25 Gram(s) IV Push once  glucagon  Injectable 1 milliGRAM(s) IntraMuscular once  heparin   Injectable 5000 Unit(s) SubCutaneous every 12 hours  insulin lispro (ADMELOG) corrective regimen sliding scale   SubCutaneous every 6 hours  levETIRAcetam  IVPB 750 milliGRAM(s) IV Intermittent every 12 hours  levothyroxine 75 MICROGram(s) Oral daily  methylPREDNISolone sodium succinate Injectable 40 milliGRAM(s) IV Push every 8 hours  metoprolol tartrate 25 milliGRAM(s) Oral two times a day  vancomycin  IVPB 1500 milliGRAM(s) IV Intermittent every 12 hours    MEDICATIONS  (PRN):  acetaminophen     Tablet .. 650 milliGRAM(s) Oral every 6 hours PRN Temp greater or equal to 38C (100.4F), Mild Pain (1 - 3)  melatonin 3 milliGRAM(s) Oral at bedtime PRN Insomnia      Vital Signs Last 24 Hrs  T(C): 36.3 (20 Dec 2021 04:22), Max: 37.6 (19 Dec 2021 09:30)  T(F): 97.4 (20 Dec 2021 04:22), Max: 99.7 (19 Dec 2021 09:30)  HR: 90 (20 Dec 2021 05:21) (79 - 99)  BP: 138/74 (20 Dec 2021 04:22) (118/60 - 153/94)  BP(mean): 65 (19 Dec 2021 13:48) (65 - 76)  RR: 18 (20 Dec 2021 04:22) (16 - 28)  SpO2: 95% (20 Dec 2021 05:21) (94% - 100%)  CAPILLARY BLOOD GLUCOSE        I&O's Summary    19 Dec 2021 07:01  -  20 Dec 2021 07:00  --------------------------------------------------------  IN: 300 mL / OUT: 600 mL / NET: -300 mL        PHYSICAL EXAM:  HEAD:  Atraumatic, Normocephalic  NECK: Supple, No   JVD  CHEST/LUNG:   no     rales,     no,    rhonchi  HEART: Regular rate and rhythm;         murmur  ABDOMEN: Soft,  obese,  distended,  mild  discomfort on palpation;   EXTREMITIES:    no    edema  NEUROLOGY:  alert    LABS:                        10.0   17.06 )-----------( 265      ( 20 Dec 2021 07:08 )             30.9     12-20    136  |  98  |  30<H>  ----------------------------<  256<H>  4.4   |  23  |  0.76    Ca    9.4      20 Dec 2021 07:05  Phos  3.4     12-20  Mg     2.6     12-20    TPro  7.5  /  Alb  3.7  /  TBili  0.2  /  DBili  x   /  AST  8<L>  /  ALT  13  /  AlkPhos  106  12-20          Urinalysis Basic - ( 19 Dec 2021 08:46 )    Color: Yellow / Appearance: Clear / S.021 / pH: x  Gluc: x / Ketone: Negative  / Bili: Negative / Urobili: 2 mg/dL   Blood: x / Protein: 30 mg/dL / Nitrite: Negative   Leuk Esterase: Negative / RBC: 1 /hpf / WBC 1 /HPF   Sq Epi: x / Non Sq Epi: 4 /hpf / Bacteria: Negative          12-19 @ 19:02  4.5  180            Culture - Blood (collected 21 @ 14:24)  Source: .Blood Blood  Gram Stain (21 @ 05:14):    Growth in aerobic bottle: Gram Positive Cocci in Clusters  Preliminary Report (21 @ 05:15):    Growth in aerobic bottle: Gram Positive Cocci in Clusters    ***Blood Panel PCR results on this specimen are available    approximately 3 hours after the Gram stain result.***    Gram stain, PCR, and/or culture results may not always    correspond due to difference in methodologies.    ************************************************************    This PCR assay was performed by multiplex PCR. This    Assay tests for 66 bacterial and resistance gene targets.    Please refer to the [facility] test directory    at [email or web address] for details.  Organism: Blood Culture PCR (21 @ 09:00)  Organism: Blood Culture PCR (21 @ 09:00)      -  Staphylococcus epidermidis, Methicillin resistant: Detec      Method Type: PCR        Consultant(s) Notes Reviewed:      Care Discussed with Consultants/Other Providers:

## 2021-12-20 NOTE — CONSULT NOTE ADULT - ASSESSMENT
84 yo F with CVA, PEG, COPD, presenting with fevers, SOB  Leukocytosis, fever  CT concerning for perforated appendicitis, atelectasis, pancreatic lesion  BCX with CoNS--no apparent cardiac device (note L shoulder hardware in place)--3/4  Suspect CoNS is colonizer  Overall,  1) Perforated appendicitis  - Abscess in setting suspected perforated appendix  - Zosyn 3.375g q 8  - F/U surgery, ? role OR for source control  - IR eval  2) Positive BCX  - Likely contam, no apparent focus  - Repeat BCXs x 2  - Hold vanco for now (restart if clinical signs worsening)  3) Leukocytosis, Fever  - Trend to normal  - Monitor for alternate sources    Jaylen Ortiz MD  Pager [phone]  From 5pm-9am, and on weekends call [phone]

## 2021-12-20 NOTE — PROGRESS NOTE ADULT - ASSESSMENT
85 yo F w/ PMHx CVA with residual left weakness (~3 years ago), PEG for dysphagia, hypothyroid, COPD (on no home Oxygen), HTN, gout, presenting from [facility] w/ complaints of difficulty breathing, found to have fever in the ED, concern for asp PNA vs COPD exacerbation, hosp course c/b perf appendicitis.

## 2021-12-20 NOTE — CONSULT NOTE ADULT - SUBJECTIVE AND OBJECTIVE BOX
HPI:  . 85 yo F     from [facility], CVA with residual left weakness (~3 years ago), PEG for dysphagia, hypothyroid, COPD (on no home Oxygen), HTN, gout,   has  a peg    p/w fever,/sob    Pt has respiratory distress, wheezing, concerning for respiratory infection, otherwise no other symptoms is reported on the chart. EMS gave solumedrol 125  (19 Dec 2021 15:39)    PAST MEDICAL & SURGICAL HISTORY:  MI (myocardial infarction)    HTN (hypertension)    Personal history of asbestosis    Gout    Dyslipidemia    UTI (lower urinary tract infection)    ETOH abuse    CVA (cerebral vascular accident)    History of left shoulder fracture    History of benign breast tumor        Allergies:    Family history:  Social history:  Meds:  acetaminophen     Tablet .. 650 milliGRAM(s) Oral every 6 hours PRN Temp greater or equal to 38C (100.4F), Mild Pain (1 - 3)  albuterol/ipratropium for Nebulization 3 milliLiter(s) Nebulizer every 6 hours  allopurinol 100 milliGRAM(s) Oral daily  amLODIPine   Tablet 10 milliGRAM(s) Oral daily  atorvastatin 20 milliGRAM(s) Oral at bedtime  azithromycin  IVPB 500 milliGRAM(s) IV Intermittent every 24 hours  dextrose 40% Gel 15 Gram(s) Oral once  dextrose 5%. 1000 milliLiter(s) IV Continuous <Continuous>  dextrose 5%. 1000 milliLiter(s) IV Continuous <Continuous>  dextrose 50% Injectable 25 Gram(s) IV Push once  dextrose 50% Injectable 12.5 Gram(s) IV Push once  dextrose 50% Injectable 25 Gram(s) IV Push once  glucagon  Injectable 1 milliGRAM(s) IntraMuscular once  heparin   Injectable 5000 Unit(s) SubCutaneous every 12 hours  insulin lispro (ADMELOG) corrective regimen sliding scale   SubCutaneous every 6 hours  levETIRAcetam  IVPB 750 milliGRAM(s) IV Intermittent every 12 hours  levothyroxine 75 MICROGram(s) Oral daily  melatonin 3 milliGRAM(s) Oral at bedtime PRN Insomnia  methylPREDNISolone sodium succinate Injectable 40 milliGRAM(s) IV Push every 8 hours  metoprolol tartrate 25 milliGRAM(s) Oral two times a day  piperacillin/tazobactam IVPB.. 3.375 Gram(s) IV Intermittent every 8 hours  sodium chloride 0.9%. 1000 milliLiter(s) IV Continuous <Continuous>  vancomycin  IVPB 1500 milliGRAM(s) IV Intermittent every 12 hours    Labs:  CBC Full  -  ( 20 Dec 2021 07:08 )  WBC Count : 17.06 K/uL  Hemoglobin : 10.0 g/dL  Hematocrit : 30.9 %  Platelet Count - Automated : 265 K/uL  Mean Cell Volume : 100.3 fl  Mean Cell Hemoglobin : 32.5 pg  Mean Cell Hemoglobin Concentration : 32.4 gm/dL  Auto Neutrophil # : x  Auto Lymphocyte # : x  Auto Monocyte # : x  Auto Eosinophil # : x  Auto Basophil # : x  Auto Neutrophil % : x  Auto Lymphocyte % : x  Auto Monocyte % : x  Auto Eosinophil % : x  Auto Basophil % : x    12-20    136  |  98  |  30<H>  ----------------------------<  256<H>  4.4   |  23  |  0.76    Ca    9.4      20 Dec 2021 07:05  Phos  3.4     12-20  Mg     2.6     12-20    TPro  7.5  /  Alb  3.7  /  TBili  0.2  /  DBili  x   /  AST  8<L>  /  ALT  13  /  AlkPhos  106  12-20  Final Diagnosis   1. 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 Lymph nodes, right axillary sentinel, sentinel node biopsies   - No tumor present in 4 lymph nodes (0/4) R : Positive >90% with strong   Primary Tumor (Invasive Carcinoma) (pT): pT1c   Regional Lymph Nodes (pN): pN0 intensity nuclear staining   PgR : Positive >90% with   intermediate to strong intensity nuclear staining   FRANKI MEHTA 4   Surgical Final Report   HER2: Equivocal (2+ membrane   staining); CISH negative (not amplified)     Radiology:     < from: CT Abdomen and Pelvis w/ IV Cont (12.19.21 @ 21:40) >  MPRESSION:  Findings concerning for perforated appendicitis with a right lower   quadrant collection associated with the appendiceal tip.    Complete atelectasis of the left lower lobe with debris occupying the   left lower lobe airways. An underlying pneumonia is not excluded.    Soft tissue density measuring 2.8 x 1.8 cm centered within the right   posterior medial acetabulum with associated erosion of the cortex (series   4 image 794). Findings are new since 7/2/2017.    Indeterminate lesion within the right acetabulum with associated osseous   erosion. Malignancy is not excluded.    Cholelithiasis and suspected choledocholithiasis. No biliary duct   dilation.    Thickening of the endometrial complex. Pelvic ultrasound may be performed   for further assessment.    Pancreatic tail lesion measuring 1 cm, possibly a side branch IPMN. This   can be further assessed with nonemergent contrast enhanced abdominal   MRI/MRCP.    < end of copied text >          ROS:  No pain, no fever  No lumps in neck or pain  No Sob or chest pain  No palpitations   No abdominal pain. No change in bowel habit. No blood in stools  No weakness in extremities  No leg swelling      Vital Signs Last 24 Hrs  T(C): 36.3 (20 Dec 2021 04:22), Max: 37.2 (19 Dec 2021 13:48)  T(F): 97.4 (20 Dec 2021 04:22), Max: 98.9 (19 Dec 2021 13:48)  HR: 92 (20 Dec 2021 09:40) (79 - 99)  BP: 138/74 (20 Dec 2021 04:22) (123/51 - 153/94)  BP(mean): 65 (19 Dec 2021 13:48) (65 - 65)  RR: 18 (20 Dec 2021 04:22) (16 - 28)  SpO2: 96% (20 Dec 2021 09:40) (94% - 100%)    Physical Exam:  Patient comfortable  AXOX3  Neck supple, no LN's  Chest: bilateral breath sounds, no wheeze or rales  CVS: regular heart rate without murmur  Abdomen: soft, BS+, no massess or organomegaly  CNS: no gross deficit  Ext: no edema       HPI:  85 yo F from [facility], CVA with residual left weakness (~3 years ago), PEG for dysphagia, hypothyroid, COPD (on no home Oxygen), HTN, gout, admitted 12/19/21 with fever and SOB  She had right simple mastectomy in 4/2019.  Any Rx after that is unknown. 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 650 milliGRAM(s) Oral every 6 hours PRN Temp greater or equal to 38C (100.4F), Mild Pain (1 - 3)  albuterol/ipratropium for Nebulization 3 milliLiter(s) Nebulizer every 6 hours  allopurinol 100 milliGRAM(s) Oral daily  amLODIPine   Tablet 10 milliGRAM(s) Oral daily  atorvastatin 20 milliGRAM(s) Oral at bedtime  azithromycin  IVPB 500 milliGRAM(s) IV Intermittent every 24 hours  dextrose 40% Gel 15 Gram(s) Oral once  dextrose 5%. 1000 milliLiter(s) IV Continuous <Continuous>  dextrose 5%. 1000 milliLiter(s) IV Continuous <Continuous>  dextrose 50% Injectable 25 Gram(s) IV Push once  dextrose 50% Injectable 12.5 Gram(s) IV Push once  dextrose 50% Injectable 25 Gram(s) IV Push once  glucagon  Injectable 1 milliGRAM(s) IntraMuscular once  heparin   Injectable 5000 Unit(s) SubCutaneous every 12 hours  insulin lispro (ADMELOG) corrective regimen sliding scale   SubCutaneous every 6 hours  levETIRAcetam  IVPB 750 milliGRAM(s) IV Intermittent every 12 hours  levothyroxine 75 MICROGram(s) Oral daily  melatonin 3 milliGRAM(s) Oral at bedtime PRN Insomnia  methylPREDNISolone sodium succinate Injectable 40 milliGRAM(s) IV Push every 8 hours  metoprolol tartrate 25 milliGRAM(s) Oral two times a day  piperacillin/tazobactam IVPB.. 3.375 Gram(s) IV Intermittent every 8 hours  sodium chloride 0.9%. 1000 milliLiter(s) IV Continuous <Continuous>  vancomycin  IVPB 1500 milliGRAM(s) IV Intermittent every 12 hours    Labs:  CBC Full  -  ( 20 Dec 2021 07:08 )  WBC Count : 17.06 K/uL  Hemoglobin : 10.0 g/dL  Hematocrit : 30.9 %  Platelet Count - Automated : 265 K/uL  Mean Cell Volume : 100.3 fl  Mean Cell Hemoglobin : 32.5 pg  Mean Cell Hemoglobin Concentration : 32.4 gm/dL  Auto Neutrophil # : x  Auto Lymphocyte # : x  Auto Monocyte # : x  Auto Eosinophil # : x  Auto Basophil # : x  Auto Neutrophil % : x  Auto Lymphocyte % : x  Auto Monocyte % : x  Auto Eosinophil % : x  Auto Basophil % : x    12-20    136  |  98  |  30<H>  ----------------------------<  256<H>  4.4   |  23  |  0.76    Ca    9.4      20 Dec 2021 07:05  Phos  3.4     12-20  Mg     2.6     12-20    TPro  7.5  /  Alb  3.7  /  TBili  0.2  /  DBili  x   /  AST  8<L>  /  ALT  13  /  AlkPhos  106  12-20  Final Diagnosis 4/2019  1. 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 Lymph nodes, right axillary sentinel, sentinel node biopsies   - No tumor present in 4 lymph nodes (0/4) R : Positive >90% with strong   Primary Tumor (Invasive Carcinoma) (pT): pT1c   Regional Lymph Nodes (pN): pN0 intensity nuclear staining   PgR : Positive >90% with   intermediate to strong intensity nuclear staining   FRANKI MEHTA 4   Surgical Final Report   HER2: Equivocal (2+ membrane   staining); CISH negative (not amplified)     Radiology:     < from: CT Abdomen and Pelvis w/ IV Cont (12.19.21 @ 21:40) >  MPRESSION:  Findings concerning for perforated appendicitis with a right lower   quadrant collection associated with the appendiceal tip.    Complete atelectasis of the left lower lobe with debris occupying the   left lower lobe airways. An underlying pneumonia is not excluded.    Soft tissue density measuring 2.8 x 1.8 cm centered within the right   posterior medial acetabulum with associated erosion of the cortex (series   4 image 794). Findings are new since 7/2/2017.    Indeterminate lesion within the right acetabulum with associated osseous   erosion. Malignancy is not excluded.    Cholelithiasis and suspected choledocholithiasis. No biliary duct   dilation.    Thickening of the endometrial complex. Pelvic ultrasound may be performed   for further assessment.    Pancreatic tail lesion measuring 1 cm, possibly a side branch IPMN. This   can be further assessed with nonemergent contrast enhanced abdominal   MRI/MRCP.    < end of copied text >          ROS:  No pain, no fever  No lumps in neck or pain  No Sob or chest pain  No palpitations   Abdminal discomfort  No weakness in extremities +  No leg swelling      Vital Signs Last 24 Hrs  T(C): 36.3 (20 Dec 2021 04:22), Max: 37.2 (19 Dec 2021 13:48)  T(F): 97.4 (20 Dec 2021 04:22), Max: 98.9 (19 Dec 2021 13:48)  HR: 92 (20 Dec 2021 09:40) (79 - 99)  BP: 138/74 (20 Dec 2021 04:22) (123/51 - 153/94)  BP(mean): 65 (19 Dec 2021 13:48) (65 - 65)  RR: 18 (20 Dec 2021 04:22) (16 - 28)  SpO2: 96% (20 Dec 2021 09:40) (94% - 100%)    Physical Exam:  Patient comfortable  AXOX3  Neck supple, no LN's  Chest: bilateral breath sounds, no wheeze or rales  CVS: regular heart rate without murmur  Abdomen: soft, softly distended, mild periumbilical tenderness, no rebound or guarding, LUQ PEG in place with mild erythema and signs of excoriation over insertion site  CNS: : LLE contracted, LUE weaknes  Musculoskeletal: no edema, see above

## 2021-12-20 NOTE — PROGRESS NOTE ADULT - SUBJECTIVE AND OBJECTIVE BOX
Patient is a 86y old  Female who presents with a chief complaint of fevers/sob (19 Dec 2021 15:39)      SUBJECTIVE / OVERNIGHT EVENTS:    MEDICATIONS  (STANDING):  albuterol/ipratropium for Nebulization 3 milliLiter(s) Nebulizer every 6 hours  allopurinol 100 milliGRAM(s) Oral daily  amLODIPine   Tablet 10 milliGRAM(s) Oral daily  atorvastatin 20 milliGRAM(s) Oral at bedtime  azithromycin  IVPB 500 milliGRAM(s) IV Intermittent every 24 hours  cefTRIAXone   IVPB 1000 milliGRAM(s) IV Intermittent every 24 hours  heparin   Injectable 5000 Unit(s) SubCutaneous every 12 hours  levETIRAcetam  IVPB 750 milliGRAM(s) IV Intermittent every 12 hours  levothyroxine 75 MICROGram(s) Oral daily  methylPREDNISolone sodium succinate Injectable 40 milliGRAM(s) IV Push every 8 hours  metoprolol tartrate 25 milliGRAM(s) Oral two times a day  vancomycin  IVPB 1500 milliGRAM(s) IV Intermittent every 12 hours    MEDICATIONS  (PRN):  acetaminophen     Tablet .. 650 milliGRAM(s) Oral every 6 hours PRN Temp greater or equal to 38C (100.4F), Mild Pain (1 - 3)  melatonin 3 milliGRAM(s) Oral at bedtime PRN Insomnia      Vital Signs Last 24 Hrs  T(C): 36.3 (20 Dec 2021 04:22), Max: 38.2 (19 Dec 2021 08:00)  T(F): 97.4 (20 Dec 2021 04:22), Max: 100.8 (19 Dec 2021 08:00)  HR: 90 (20 Dec 2021 05:21) (79 - 709)  BP: 138/74 (20 Dec 2021 04:22) (117/52 - 153/94)  BP(mean): 65 (19 Dec 2021 13:48) (65 - 76)  RR: 18 (20 Dec 2021 04:22) (16 - 28)  SpO2: 95% (20 Dec 2021 05:21) (94% - 100%)  CAPILLARY BLOOD GLUCOSE        I&O's Summary    19 Dec 2021 07:01  -  20 Dec 2021 07:00  --------------------------------------------------------  IN: 300 mL / OUT: 600 mL / NET: -300 mL        PHYSICAL EXAM:  GENERAL: NAD, well-developed, on BiPAP  HEAD:  Atraumatic, Normocephalic  EYES: EOMI, PERRLA, conjunctiva and sclera clear  NECK: Supple, No JVD  CHEST/LUNG: some wheezing present bilaterally  HEART: Regular rate and rhythm; No murmurs, rubs, or gallops  ABDOMEN: Soft, Nontender, Nondistended; Bowel sounds present  EXTREMITIES:  2+ Peripheral Pulses, No clubbing, cyanosis, or edema  PSYCH: AAOx3  NEUROLOGY: non-focal  SKIN: No rashes or lesions    LABS:                        10.6   17.77 )-----------( 270      ( 19 Dec 2021 08:45 )             34.4     12-    135  |  95<L>  |  37<H>  ----------------------------<  186<H>  5.4<H>   |  23  |  0.99    Ca    9.2      19 Dec 2021 08:45    TPro  7.8  /  Alb  3.4  /  TBili  0.3  /  DBili  x   /  AST  10  /  ALT  15  /  AlkPhos  108  12-          Urinalysis Basic - ( 19 Dec 2021 08:46 )    Color: Yellow / Appearance: Clear / S.021 / pH: x  Gluc: x / Ketone: Negative  / Bili: Negative / Urobili: 2 mg/dL   Blood: x / Protein: 30 mg/dL / Nitrite: Negative   Leuk Esterase: Negative / RBC: 1 /hpf / WBC 1 /HPF   Sq Epi: x / Non Sq Epi: 4 /hpf / Bacteria: Negative        RADIOLOGY & ADDITIONAL TESTS:    Imaging Personally Reviewed:    Consultant(s) Notes Reviewed:      Care Discussed with Consultants/Other Providers:   Patient is a 86y old  Female who presents with a chief complaint of fevers/sob (19 Dec 2021 15:39)      SUBJECTIVE / OVERNIGHT EVENTS:    MEDICATIONS  (STANDING):  albuterol/ipratropium for Nebulization 3 milliLiter(s) Nebulizer every 6 hours  allopurinol 100 milliGRAM(s) Oral daily  amLODIPine   Tablet 10 milliGRAM(s) Oral daily  atorvastatin 20 milliGRAM(s) Oral at bedtime  azithromycin  IVPB 500 milliGRAM(s) IV Intermittent every 24 hours  cefTRIAXone   IVPB 1000 milliGRAM(s) IV Intermittent every 24 hours  heparin   Injectable 5000 Unit(s) SubCutaneous every 12 hours  levETIRAcetam  IVPB 750 milliGRAM(s) IV Intermittent every 12 hours  levothyroxine 75 MICROGram(s) Oral daily  methylPREDNISolone sodium succinate Injectable 40 milliGRAM(s) IV Push every 8 hours  metoprolol tartrate 25 milliGRAM(s) Oral two times a day  vancomycin  IVPB 1500 milliGRAM(s) IV Intermittent every 12 hours    MEDICATIONS  (PRN):  acetaminophen     Tablet .. 650 milliGRAM(s) Oral every 6 hours PRN Temp greater or equal to 38C (100.4F), Mild Pain (1 - 3)  melatonin 3 milliGRAM(s) Oral at bedtime PRN Insomnia      Vital Signs Last 24 Hrs  T(C): 36.3 (20 Dec 2021 04:22), Max: 38.2 (19 Dec 2021 08:00)  T(F): 97.4 (20 Dec 2021 04:22), Max: 100.8 (19 Dec 2021 08:00)  HR: 90 (20 Dec 2021 05:21) (79 - 709)  BP: 138/74 (20 Dec 2021 04:22) (117/52 - 153/94)  BP(mean): 65 (19 Dec 2021 13:48) (65 - 76)  RR: 18 (20 Dec 2021 04:22) (16 - 28)  SpO2: 95% (20 Dec 2021 05:21) (94% - 100%)  CAPILLARY BLOOD GLUCOSE        I&O's Summary    19 Dec 2021 07:01  -  20 Dec 2021 07:00  --------------------------------------------------------  IN: 300 mL / OUT: 600 mL / NET: -300 mL        PHYSICAL EXAM:  GENERAL: NAD, well-developed, on BiPAP  HEAD:  Atraumatic, Normocephalic  EYES: EOMI, PERRLA, conjunctiva and sclera clear  NECK: Supple, No JVD  CHEST/LUNG: some wheezing present bilaterally  HEART: Regular rate and rhythm; No murmurs, rubs, or gallops  ABDOMEN: Soft, Nontender, Nondistended; Bowel sounds present  EXTREMITIES:  2+ Peripheral Pulses, No clubbing, cyanosis, or edema  PSYCH: AAOx3, improved from yesterday   NEUROLOGY: non-focal  SKIN: No rashes or lesions    LABS:                        10.6   17.77 )-----------( 270      ( 19 Dec 2021 08:45 )             34.4         135  |  95<L>  |  37<H>  ----------------------------<  186<H>  5.4<H>   |  23  |  0.99    Ca    9.2      19 Dec 2021 08:45    TPro  7.8  /  Alb  3.4  /  TBili  0.3  /  DBili  x   /  AST  10  /  ALT  15  /  AlkPhos  108            Urinalysis Basic - ( 19 Dec 2021 08:46 )    Color: Yellow / Appearance: Clear / S.021 / pH: x  Gluc: x / Ketone: Negative  / Bili: Negative / Urobili: 2 mg/dL   Blood: x / Protein: 30 mg/dL / Nitrite: Negative   Leuk Esterase: Negative / RBC: 1 /hpf / WBC 1 /HPF   Sq Epi: x / Non Sq Epi: 4 /hpf / Bacteria: Negative        RADIOLOGY & ADDITIONAL TESTS:    Imaging Personally Reviewed:    Consultant(s) Notes Reviewed:      Care Discussed with Consultants/Other Providers:   Patient is a 86y old  Female who presents with a chief complaint of fevers/sob (19 Dec 2021 15:39)      SUBJECTIVE / OVERNIGHT EVENTS:    Pt appears comfortable off BiPAP, speaking full sentences. Feels better, complaining of RLQ abd pain.     MEDICATIONS  (STANDING):  albuterol/ipratropium for Nebulization 3 milliLiter(s) Nebulizer every 6 hours  allopurinol 100 milliGRAM(s) Oral daily  amLODIPine   Tablet 10 milliGRAM(s) Oral daily  atorvastatin 20 milliGRAM(s) Oral at bedtime  azithromycin  IVPB 500 milliGRAM(s) IV Intermittent every 24 hours  cefTRIAXone   IVPB 1000 milliGRAM(s) IV Intermittent every 24 hours  heparin   Injectable 5000 Unit(s) SubCutaneous every 12 hours  levETIRAcetam  IVPB 750 milliGRAM(s) IV Intermittent every 12 hours  levothyroxine 75 MICROGram(s) Oral daily  methylPREDNISolone sodium succinate Injectable 40 milliGRAM(s) IV Push every 8 hours  metoprolol tartrate 25 milliGRAM(s) Oral two times a day  vancomycin  IVPB 1500 milliGRAM(s) IV Intermittent every 12 hours    MEDICATIONS  (PRN):  acetaminophen     Tablet .. 650 milliGRAM(s) Oral every 6 hours PRN Temp greater or equal to 38C (100.4F), Mild Pain (1 - 3)  melatonin 3 milliGRAM(s) Oral at bedtime PRN Insomnia      Vital Signs Last 24 Hrs  T(C): 36.3 (20 Dec 2021 04:22), Max: 38.2 (19 Dec 2021 08:00)  T(F): 97.4 (20 Dec 2021 04:22), Max: 100.8 (19 Dec 2021 08:00)  HR: 90 (20 Dec 2021 05:21) (79 - 709)  BP: 138/74 (20 Dec 2021 04:22) (117/52 - 153/94)  BP(mean): 65 (19 Dec 2021 13:48) (65 - 76)  RR: 18 (20 Dec 2021 04:22) (16 - 28)  SpO2: 95% (20 Dec 2021 05:21) (94% - 100%)  CAPILLARY BLOOD GLUCOSE        I&O's Summary    19 Dec 2021 07:01  -  20 Dec 2021 07:00  --------------------------------------------------------  IN: 300 mL / OUT: 600 mL / NET: -300 mL        PHYSICAL EXAM:  GENERAL: NAD, well-developed, on BiPAP  HEAD:  Atraumatic, Normocephalic  EYES: EOMI, PERRLA, conjunctiva and sclera clear  NECK: Supple, No JVD  CHEST/LUNG: some wheezing present bilaterally  HEART: Regular rate and rhythm; No murmurs, rubs, or gallops  ABDOMEN: Soft, Nontender, Nondistended; Bowel sounds present  EXTREMITIES:  2+ Peripheral Pulses, No clubbing, cyanosis, or edema  PSYCH: AAOx3, improved from yesterday   NEUROLOGY: non-focal  SKIN: No rashes or lesions    LABS:                        10.6   17.77 )-----------( 270      ( 19 Dec 2021 08:45 )             34.4     12-    135  |  95<L>  |  37<H>  ----------------------------<  186<H>  5.4<H>   |  23  |  0.99    Ca    9.2      19 Dec 2021 08:45    TPro  7.8  /  Alb  3.4  /  TBili  0.3  /  DBili  x   /  AST  10  /  ALT  15  /  AlkPhos  108  12-          Urinalysis Basic - ( 19 Dec 2021 08:46 )    Color: Yellow / Appearance: Clear / S.021 / pH: x  Gluc: x / Ketone: Negative  / Bili: Negative / Urobili: 2 mg/dL   Blood: x / Protein: 30 mg/dL / Nitrite: Negative   Leuk Esterase: Negative / RBC: 1 /hpf / WBC 1 /HPF   Sq Epi: x / Non Sq Epi: 4 /hpf / Bacteria: Negative        RADIOLOGY & ADDITIONAL TESTS:    Imaging Personally Reviewed:    Consultant(s) Notes Reviewed:      Care Discussed with Consultants/Other Providers:

## 2021-12-20 NOTE — CONSULT NOTE ADULT - ATTENDING COMMENTS
I have seen and examined the patient. I agree with the above surgery resident's note.  rlq abscess- flor bansal, abd benign  rec IR drainage  abx per ID

## 2021-12-20 NOTE — DIETITIAN INITIAL EVALUATION ADULT. - ADD RECOMMEND
1) Continue NPO with TF when medically feasible 2) Monitor labs, weight, skin integrity, GI distress, intake and tolerance 3) Pain regimen per team

## 2021-12-20 NOTE — CONSULT NOTE ADULT - SUBJECTIVE AND OBJECTIVE BOX
"HPI:  . 85 yo F     from [facility] rehab, CVA with residual left weakness (~3 years ago), PEG for dysphagia, hypothyroid, COPD (on no home Oxygen), HTN, gout,   has  a peg    p/w fever,/sob    Pt has respiratory distress, wheezing, concerning for respiratory infection, otherwise no other symptoms is reported on the chart. EMS gave solumedrol 125  (19 Dec 2021 15:39)"    Above reviewed. 86 yo F with CVA, PEG, COPD, presenting with fevers, SOB. At bedside, patient generally appears well. Slightly SOB, minimal abdominal pain. No diarrhea, no dysuria, no pyuria. No other complaints. Imaging suspicious for appendicitis. Then patient with CoNS in BCX. ID called for further eval.    PAST MEDICAL & SURGICAL HISTORY:  MI (myocardial infarction)    HTN (hypertension)    Personal history of asbestosis    Gout    Dyslipidemia    UTI (lower urinary tract infection)    ETOH abuse    CVA (cerebral vascular accident)    History of left shoulder fracture    History of benign breast tumor    Allergies    Toradol (Urticaria (Mild to Mod); Rash (Mild to Mod))    Intolerances    ANTIMICROBIALS:  azithromycin  IVPB 500 every 24 hours  piperacillin/tazobactam IVPB.. 3.375 every 8 hours    OTHER MEDS:  acetaminophen     Tablet .. 650 milliGRAM(s) Oral every 6 hours PRN  albuterol/ipratropium for Nebulization 3 milliLiter(s) Nebulizer every 6 hours  allopurinol 100 milliGRAM(s) Oral daily  amLODIPine   Tablet 10 milliGRAM(s) Oral daily  atorvastatin 20 milliGRAM(s) Oral at bedtime  dextrose 40% Gel 15 Gram(s) Oral once  dextrose 5%. 1000 milliLiter(s) IV Continuous <Continuous>  dextrose 5%. 1000 milliLiter(s) IV Continuous <Continuous>  dextrose 50% Injectable 25 Gram(s) IV Push once  dextrose 50% Injectable 12.5 Gram(s) IV Push once  dextrose 50% Injectable 25 Gram(s) IV Push once  glucagon  Injectable 1 milliGRAM(s) IntraMuscular once  heparin   Injectable 5000 Unit(s) SubCutaneous every 12 hours  insulin lispro (ADMELOG) corrective regimen sliding scale   SubCutaneous every 6 hours  levETIRAcetam  IVPB 750 milliGRAM(s) IV Intermittent every 12 hours  levothyroxine 75 MICROGram(s) Oral daily  melatonin 3 milliGRAM(s) Oral at bedtime PRN  methylPREDNISolone sodium succinate Injectable 40 milliGRAM(s) IV Push every 8 hours  metoprolol tartrate 25 milliGRAM(s) Oral two times a day  sodium chloride 0.9%. 1000 milliLiter(s) IV Continuous <Continuous>    SOCIAL HISTORY: No tobacco, no alcohol, no illicit drugs    FAMILY HISTORY:  No pertinent family history in first degree relatives relating to chief complaint    Drug Dosing Weight  Height (cm): 162.6 (19 Dec 2021 07:32)  Weight (kg): 109.5 (19 Dec 2021 07:32)  BMI (kg/m2): 41.4 (19 Dec 2021 07:32)  BSA (m2): 2.12 (19 Dec 2021 07:32)    PE:    Vital Signs Last 24 Hrs  T(C): 36.3 (20 Dec 2021 13:38), Max: 37.2 (19 Dec 2021 13:48)  T(F): 97.4 (20 Dec 2021 13:38), Max: 98.9 (19 Dec 2021 13:48)  HR: 109 (20 Dec 2021 13:38) (79 - 109)  BP: 136/66 (20 Dec 2021 13:38) (123/51 - 153/94)  BP(mean): 65 (19 Dec 2021 13:48) (65 - 65)  RR: 18 (20 Dec 2021 13:38) (16 - 28)  SpO2: 96% (20 Dec 2021 13:38) (92% - 100%)    Gen: AOx3, NAD, non-toxic  CV: S1+S2 normal, nontachycardic  Resp: Clear bilat, no resp distress, no crackles/wheezes  Abd: Soft, nontender, +BS  Ext: No LE edema, no wounds  : No Basurto  IV/Skin: No thrombophlebitis  Msk: No low back pain, no arthralgias, no joint swelling  Neuro: No sensory deficits, no motor deficits    LABS:                        10.0   17.06 )-----------( 265      ( 20 Dec 2021 07:08 )             30.9     12-    136  |  98  |  30<H>  ----------------------------<  256<H>  4.4   |  23  |  0.76    Ca    9.4      20 Dec 2021 07:05  Phos  3.4       Mg     2.6         TPro  7.5  /  Alb  3.7  /  TBili  0.2  /  DBili  x   /  AST  8<L>  /  ALT  13  /  AlkPhos  106      Urinalysis Basic - ( 19 Dec 2021 08:46 )    Color: Yellow / Appearance: Clear / S.021 / pH: x  Gluc: x / Ketone: Negative  / Bili: Negative / Urobili: 2 mg/dL   Blood: x / Protein: 30 mg/dL / Nitrite: Negative   Leuk Esterase: Negative / RBC: 1 /hpf / WBC 1 /HPF   Sq Epi: x / Non Sq Epi: 4 /hpf / Bacteria: Negative    MICROBIOLOGY:    Clean Catch Clean Catch (Midstream)  21   <10,000 CFU/mL Normal Urogenital Regina    .Blood Blood  21   Growth in aerobic bottle: Gram Positive Cocci in Clusters  Growth in anaerobic bottle: Gram Positive Cocci in Clusters  ***Blood Panel PCR results on this specimen are available  approximately 3 hours after the Gram stain result.***  Gram stain, PCR, and/or culture results may not always  correspond due to difference in methodologies.  ************************************************************  This PCR assay was performed by multiplex PCR. This  Assay tests for 66 bacterial and resistance gene targets.  Please refer to the [facility] test directory  at [email or web address] for details.  --  Blood Culture PCR    Rapid RVP Result: NotDetec ( @ 08:44)    RADIOLOGY:     CT:    IMPRESSION:  Findings concerning for perforated appendicitis with a right lower   quadrant collection associated with the appendiceal tip.    Complete atelectasis of the left lower lobe with debris occupying the   left lower lobe airways. An underlying pneumonia is not excluded.    Indeterminate lesion within the right acetabulum with associated osseous   erosion. Malignancy is not excluded.    Cholelithiasis and suspected choledocholithiasis. No biliary duct   dilation.    Thickening of the endometrial complex. Pelvic ultrasound may be performed   for further assessment.    Pancreatic tail lesion measuring 1 cm, possibly a side branch IPMN. This   can be further assessed with nonemergent contrast enhanced abdominal   MRI/MRCP.

## 2021-12-20 NOTE — PROGRESS NOTE ADULT - ASSESSMENT
86   year old female      h/o hemorrhagic CVA, has  PEG,  HTN, MI,      HLD, gout   right Ca breast. s/p mastectomy in 2019,  s/p  sentinel node  localization.  4/4,  were  negative         *  p/w SOB and  acute respiratory distress    was  on   bipap  in er   with  elevated wbc of 17,000 on arrival,  from pna/  probable aspiration/ gram negative pna  cxr,? pl effusion, right  lung opacity  CT c, ordered  *  dementia    *   s/p cva, has  PEG,  npo/,  on  synthroid, Keppra  ,  *  HTN, on meds  per  card  prior  echo,  normal ef  *  h/o ca breast. /, s/p  R  mastectomy  *  bacteremia. / staph epi, meth R  ct  c/a/p  pending     rd< from: Xray Chest 1 View- PORTABLE-Urgent (12.19.21 @ 09:20) >  IMPRESSION:  Low lung volumes. New small left lower lung hazy opacity may represent   atelectasis versus pleural effusion. Redemonstration of right upper lung   perihilar opacity largely unchanged prior exam.  --- End of Report --  < end of copied text >             86   year old female      h/o hemorrhagic CVA, has  PEG,  HTN, MI,      HLD, gout   right Ca breast. s/p mastectomy in 2019,  s/p  sentinel node  localization.  4/4,  were  negative         *  p/w SOB and  acute respiratory distress    was  on   bipap  in er   with  elevated wbc of 17,000 on arrival,  from pna/  probable aspiration/ gram negative pna  cxr,? pl effusion, right  lung opacity  CT c, ordered  *  dementia    *   s/p cva, has  PEG,  npo/,  on  synthroid, Keppra  ,  *  HTN, on meds  per  card  prior  echo,  normal ef  *  h/o ca breast. /, s/p  R  mastectomy  *  bacteremia. / staph epi, meth R. may be  a  contaminant  spoke  with ID d r oey  ct  c/a/p  pending     rd< from: Xray Chest 1 View- PORTABLE-Urgent (12.19.21 @ 09:20) >  IMPRESSION:  Low lung volumes. New small left lower lung hazy opacity may represent   atelectasis versus pleural effusion. Redemonstration of right upper lung   perihilar opacity largely unchanged prior exam.  --- End of Report --  < end of copied text >             86   year old female      h/o hemorrhagic CVA, has  PEG,  HTN, MI,      HLD, gout   right Ca breast. s/p mastectomy in 2019,  s/p  sentinel node  localization.  4/4,  were  negative         *  p/w SOB and  acute respiratory distress    was  on   bipap  in er   with  elevated wbc of 17,000 on arrival,  from pna/  probable aspiration/ gram negative pna  cxr,? pl effusion, right  lung opacity  CT c, ordered  *  dementia , mild   *   s/p cva, has  PEG,  npo/,  on  synthroid, Keppra  ,  *  HTN, on meds  per  card  prior  echo,  normal ef  *  h/o ca breast. /, s/p  R  mastectomy  *  bacteremia. / staph epi, meth R. may be  a  contaminant  spoke  with ID d r oey  ct  c/a/p, ?  pna, perforated  appendicitis,  ?  mets  to  acetabulum   surg/ IR  and  oncology eval dr pacheco  called  appreciate  excellent  care of  house  staff     rd< from: Xray Chest 1 View- PORTABLE-Urgent (12.19.21 @ 09:20) >  IMPRESSION:  Low lung volumes. New small left lower lung hazy opacity may represent   atelectasis versus pleural effusion. Redemonstration of right upper lung   perihilar opacity largely unchanged prior exam.  --- End of Report --  < end of copied text >

## 2021-12-20 NOTE — DIETITIAN INITIAL EVALUATION ADULT. - LAB (SPECIFY)
Electrolytes, glucose, renal indices, liver enzymes, CBC Electrolytes, glucose, renal indices, liver enzymes, CBC; pending HbA1c

## 2021-12-20 NOTE — DIETITIAN INITIAL EVALUATION ADULT. - NUTRITIONGOAL OUTCOME1
Pt to meet >75% of EER Pt to meet >80% of EER Pt to meet >80% of estimated nutritional needs during hospital stay.

## 2021-12-20 NOTE — DIETITIAN INITIAL EVALUATION ADULT. - CHIEF COMPLAINT
87 yo F with PMH of hemorrhagic CVA with residual left weakness (~3 years ago), MI, HLD, HTN, hypothyroidism, COPD (no home O2), gout, right breast cancer s/p mastectomy (2019) and s/p sentinel node localization with negative result. Pt is also on PEG for dysphagia. Admitted from [facility] due to fever/SOB with complaints of respiratory distress and wheezing, concern for respiratory infection. Due to LOS 1, no plan documented yet. Per chart: 87 yo F with PMH of hemorrhagic CVA with residual left weakness (~3 years ago), MI, HLD, HTN, hypothyroidism, COPD (no home O2), gout, right breast cancer s/p mastectomy (2019) and s/p sentinel node localization with negative result. Pt is also s/p PEG for dysphagia. Admitted from [facility] due to fever/SOB with complaints of respiratory distress and wheezing, concern for respiratory infection.

## 2021-12-21 NOTE — PROGRESS NOTE ADULT - SUBJECTIVE AND OBJECTIVE BOX
TEAM B Surgery Progress Note    INTERVAL EVENTS:   No acute events overnight.    SUBJECTIVE: Patient seen and examined at bedside with surgical team, patient mild abdominal pain. Denies fever, chills, CP, SOB nausea, vomiting.     OBJECTIVE:    Vital Signs Last 24 Hrs  T(C): 36.3 (20 Dec 2021 21:00), Max: 36.3 (20 Dec 2021 04:22)  T(F): 97.3 (20 Dec 2021 21:00), Max: 97.4 (20 Dec 2021 04:22)  HR: 94 (20 Dec 2021 23:26) (90 - 109)  BP: 152/83 (20 Dec 2021 21:00) (136/66 - 165/82)  BP(mean): --  RR: 18 (20 Dec 2021 21:00) (18 - 18)  SpO2: 96% (20 Dec 2021 23:26) (92% - 96%)  --------------------------------------------------------    PHYSICAL EXAM:  General: AAOx3, no acute distress.  Respiratory: on 6L NC with breathing treatment ongoing  Abdomen: soft, softly distended, mild periumbilical tenderness, no rebound or guarding, LUQ PEG in place with mild erythema and signs of excoriation over insertion site  Extremities: LLE contracted, LUE weakness      IN:    IV PiggyBack: 300 mL  Total IN: 300 mL    OUT:    Voided (mL): 600 mL  Total OUT: 600 mL    Total NET: -300 mL      20 Dec 2021 07:01  -  21 Dec 2021 02:24  --------------------------------------------------------  IN:    Free Water: 150 mL    IV PiggyBack: 200 mL    Jevity 1.2: 500 mL  Total IN: 850 mL    OUT:    Voided (mL): 1300 mL  Total OUT: 1300 mL    Total NET: -450 mL      MEDICATIONS  (STANDING):  albuterol/ipratropium for Nebulization 3 milliLiter(s) Nebulizer every 6 hours  allopurinol 100 milliGRAM(s) Oral daily  amLODIPine   Tablet 10 milliGRAM(s) Oral daily  atorvastatin 20 milliGRAM(s) Oral at bedtime  azithromycin  IVPB 500 milliGRAM(s) IV Intermittent every 24 hours  dextrose 40% Gel 15 Gram(s) Oral once  dextrose 5%. 1000 milliLiter(s) (50 mL/Hr) IV Continuous <Continuous>  dextrose 5%. 1000 milliLiter(s) (100 mL/Hr) IV Continuous <Continuous>  dextrose 50% Injectable 25 Gram(s) IV Push once  dextrose 50% Injectable 12.5 Gram(s) IV Push once  dextrose 50% Injectable 25 Gram(s) IV Push once  glucagon  Injectable 1 milliGRAM(s) IntraMuscular once  heparin   Injectable 5000 Unit(s) SubCutaneous every 12 hours  insulin lispro (ADMELOG) corrective regimen sliding scale   SubCutaneous every 6 hours  levETIRAcetam  IVPB 750 milliGRAM(s) IV Intermittent every 12 hours  levothyroxine 75 MICROGram(s) Oral daily  metoprolol tartrate 25 milliGRAM(s) Oral two times a day  piperacillin/tazobactam IVPB.. 3.375 Gram(s) IV Intermittent every 8 hours  predniSONE   Tablet 40 milliGRAM(s) Oral daily  sodium chloride 0.9%. 1000 milliLiter(s) (75 mL/Hr) IV Continuous <Continuous>    MEDICATIONS  (PRN):  acetaminophen     Tablet .. 650 milliGRAM(s) Oral every 6 hours PRN Temp greater or equal to 38C (100.4F), Mild Pain (1 - 3)  melatonin 3 milliGRAM(s) Oral at bedtime PRN Insomnia      PHYSICAL EXAM:  Constitutional: A&Ox3, NAD  Respiratory: Unlabored breathing  Abdomen: Soft, nondistended, NTTP. No rebound or guarding.  Extremities: WWP, MONTES DE OCA spontaneously    LABS:                        10.0   17.06 )-----------( 265      ( 20 Dec 2021 07:08 )             30.9     12-20    136  |  98  |  30<H>  ----------------------------<  256<H>  4.4   |  23  |  0.76    Ca    9.4      20 Dec 2021 07:05  Phos  3.4     12-20  Mg     2.6     12-20    TPro  7.5  /  Alb  3.7  /  TBili  0.2  /  DBili  x   /  AST  8<L>  /  ALT  13  /  AlkPhos  106  12-      LIVER FUNCTIONS - ( 20 Dec 2021 07:05 )  Alb: 3.7 g/dL / Pro: 7.5 g/dL / ALK PHOS: 106 U/L / ALT: 13 U/L / AST: 8 U/L / GGT: x           Urinalysis Basic - ( 19 Dec 2021 08:46 )    Color: Yellow / Appearance: Clear / S.021 / pH: x  Gluc: x / Ketone: Negative  / Bili: Negative / Urobili: 2 mg/dL   Blood: x / Protein: 30 mg/dL / Nitrite: Negative   Leuk Esterase: Negative / RBC: 1 /hpf / WBC 1 /HPF   Sq Epi: x / Non Sq Epi: 4 /hpf / Bacteria: Negative          IMAGING:     GRE[facility] Surgery Progress Note    INTERVAL EVENTS:   No acute events overnight.    SUBJECTIVE: Patient seen and examined at bedside with surgical team, patient mild abdominal pain. Denies fever, chills, CP, SOB nausea, vomiting.     OBJECTIVE:    Vital Signs Last 24 Hrs  T(C): 36.3 (20 Dec 2021 21:00), Max: 36.3 (20 Dec 2021 04:22)  T(F): 97.3 (20 Dec 2021 21:00), Max: 97.4 (20 Dec 2021 04:22)  HR: 94 (20 Dec 2021 23:26) (90 - 109)  BP: 152/83 (20 Dec 2021 21:00) (136/66 - 165/82)  BP(mean): --  RR: 18 (20 Dec 2021 21:00) (18 - 18)  SpO2: 96% (20 Dec 2021 23:26) (92% - 96%)  --------------------------------------------------------    PHYSICAL EXAM:  General: AAOx3, no acute distress.  Respiratory: on 6L NC with breathing treatment ongoing  Abdomen: soft, softly distended, mild periumbilical tenderness, no rebound or guarding, LUQ PEG in place with mild erythema and signs of excoriation over insertion site  Extremities: LLE contracted, LUE weakness      IN:    IV PiggyBack: 300 mL  Total IN: 300 mL    OUT:    Voided (mL): 600 mL  Total OUT: 600 mL    Total NET: -300 mL      20 Dec 2021 07:01  -  21 Dec 2021 02:24  --------------------------------------------------------  IN:    Free Water: 150 mL    IV PiggyBack: 200 mL    Jevity 1.2: 500 mL  Total IN: 850 mL    OUT:    Voided (mL): 1300 mL  Total OUT: 1300 mL    Total NET: -450 mL      MEDICATIONS  (STANDING):  albuterol/ipratropium for Nebulization 3 milliLiter(s) Nebulizer every 6 hours  allopurinol 100 milliGRAM(s) Oral daily  amLODIPine   Tablet 10 milliGRAM(s) Oral daily  atorvastatin 20 milliGRAM(s) Oral at bedtime  azithromycin  IVPB 500 milliGRAM(s) IV Intermittent every 24 hours  dextrose 40% Gel 15 Gram(s) Oral once  dextrose 5%. 1000 milliLiter(s) (50 mL/Hr) IV Continuous <Continuous>  dextrose 5%. 1000 milliLiter(s) (100 mL/Hr) IV Continuous <Continuous>  dextrose 50% Injectable 25 Gram(s) IV Push once  dextrose 50% Injectable 12.5 Gram(s) IV Push once  dextrose 50% Injectable 25 Gram(s) IV Push once  glucagon  Injectable 1 milliGRAM(s) IntraMuscular once  heparin   Injectable 5000 Unit(s) SubCutaneous every 12 hours  insulin lispro (ADMELOG) corrective regimen sliding scale   SubCutaneous every 6 hours  levETIRAcetam  IVPB 750 milliGRAM(s) IV Intermittent every 12 hours  levothyroxine 75 MICROGram(s) Oral daily  metoprolol tartrate 25 milliGRAM(s) Oral two times a day  piperacillin/tazobactam IVPB.. 3.375 Gram(s) IV Intermittent every 8 hours  predniSONE   Tablet 40 milliGRAM(s) Oral daily  sodium chloride 0.9%. 1000 milliLiter(s) (75 mL/Hr) IV Continuous <Continuous>    MEDICATIONS  (PRN):  acetaminophen     Tablet .. 650 milliGRAM(s) Oral every 6 hours PRN Temp greater or equal to 38C (100.4F), Mild Pain (1 - 3)  melatonin 3 milliGRAM(s) Oral at bedtime PRN Insomnia      PHYSICAL EXAM:  Constitutional: A&Ox3, NAD  Respiratory: Unlabored breathing  Abdomen: Soft, nondistended, NTTP. No rebound or guarding.  Extremities: WWP, MONTES DE OCA spontaneously    LABS:                        10.0   17.06 )-----------( 265      ( 20 Dec 2021 07:08 )             30.9     12-20    136  |  98  |  30<H>  ----------------------------<  256<H>  4.4   |  23  |  0.76    Ca    9.4      20 Dec 2021 07:05  Phos  3.4     12-20  Mg     2.6     12-20    TPro  7.5  /  Alb  3.7  /  TBili  0.2  /  DBili  x   /  AST  8<L>  /  ALT  13  /  AlkPhos  106  12-20      LIVER FUNCTIONS - ( 20 Dec 2021 07:05 )  Alb: 3.7 g/dL / Pro: 7.5 g/dL / ALK PHOS: 106 U/L / ALT: 13 U/L / AST: 8 U/L / GGT: x           Urinalysis Basic - ( 19 Dec 2021 08:46 )    Color: Yellow / Appearance: Clear / S.021 / pH: x  Gluc: x / Ketone: Negative  / Bili: Negative / Urobili: 2 mg/dL   Blood: x / Protein: 30 mg/dL / Nitrite: Negative   Leuk Esterase: Negative / RBC: 1 /hpf / WBC 1 /HPF   Sq Epi: x / Non Sq Epi: 4 /hpf / Bacteria: Negative          IMAGING:

## 2021-12-21 NOTE — PROGRESS NOTE ADULT - ASSESSMENT
87 yo F w/ PMHx CVA with residual left weakness (~3 years ago), PEG for dysphagia, hypothyroid, COPD (on no home Oxygen), HTN, gout, presenting from [facility] w/ complaints of difficulty breathing, found to have fever in the ED, concern for asp PNA vs COPD exacerbation, hosp course c/b perf appendicitis.

## 2021-12-21 NOTE — PROGRESS NOTE ADULT - PROBLEM SELECTOR PLAN 4
Likely 2/2 to CVA, s/p PEG by GI 4 years ago  - c/w PEG feeding  - NPO @ midnight on 12/22 Likely 2/2 to CVA, s/p PEG by GI 4 years ago  - c/w PEG feeding  - Dietician recs appreciated   - NPO @ midnight on 12/22

## 2021-12-21 NOTE — PROGRESS NOTE ADULT - PROBLEM SELECTOR PLAN 3
H/o COPD, no home O2, now presenting w/ hypoxia, concern for COPD exacerbation  - Will treat w/ prednisone for 3 more days   - c/w zosyn  - airway clearance and chest PT H/o COPD, no home O2, now presenting w/ hypoxia, concern for COPD exacerbation  - prednisone stopped 2/2 hyperglycemia  - c/w zosyn  - airway clearance and chest PT

## 2021-12-21 NOTE — PROGRESS NOTE ADULT - SUBJECTIVE AND OBJECTIVE BOX
CC: F/U for Appendicitis    Saw/spoke to patient. Unchanged. No new complaints.    Allergies  Toradol (Urticaria (Mild to Mod); Rash (Mild to Mod))    ANTIMICROBIALS:  piperacillin/tazobactam IVPB.. 3.375 every 8 hours    PE:    Vital Signs Last 24 Hrs  T(C): 36.4 (21 Dec 2021 12:52), Max: 36.6 (21 Dec 2021 04:27)  T(F): 97.6 (21 Dec 2021 12:52), Max: 97.8 (21 Dec 2021 04:27)  HR: 106 (21 Dec 2021 12:52) (86 - 106)  BP: 148/76 (21 Dec 2021 12:52) (134/70 - 165/82)  RR: 18 (21 Dec 2021 12:52) (18 - 18)  SpO2: 95% (21 Dec 2021 12:52) (95% - 99%)    Gen: AOx3, NAD, non-toxic  CV: S1+S2 normal, tachycardic  Resp: Clear bilat, no resp distress, no crackles/wheezes  Abd: Soft, nontender, +BS  Ext: No LE edema, no wounds    LABS:                        9.8    13.48 )-----------( 293      ( 21 Dec 2021 11:54 )             31.3     12-21    141  |  101  |  29<H>  ----------------------------<  219<H>  4.1   |  23  |  0.71    Ca    8.8      21 Dec 2021 11:54  Phos  3.1     12-21  Mg     2.6     12-21    TPro  7.2  /  Alb  3.7  /  TBili  0.2  /  DBili  x   /  AST  10  /  ALT  15  /  AlkPhos  94  12-21    MICROBIOLOGY:    Clean Catch Clean Catch (Midstream)  12-19-21   <10,000 CFU/mL Normal Urogenital Regina     .Blood Blood  12-19-21   Growth in aerobic and anaerobic bottles: Staphylococcus epidermidis Coag  Negative Staphylococcus  Single set isolate, possible contaminant. Contact  Microbiology if susceptibility testing clinically  indicated.  ***Blood Panel PCR results on this specimen are available  approximately 3 hours after the Gram stain result.***  Gram stain, PCR, and/or culture results may not always  correspond due to difference in methodologies.  ************************************************************  This PCR assay was performed by multiplex PCR. This  Assay tests for 66 bacterial and resistance gene targets.  Please refer to the [facility] test directory  at [email or web address] for details.  --  Blood Culture PCR    Rapid RVP Result: NotDetec (12-19 @ 08:44)    (otherwise reviewed)    RADIOLOGY:    12/19 CT:    IMPRESSION:  Findings concerning for perforated appendicitis with a right lower   quadrant collection associated with the appendiceal tip.    Complete atelectasis of the left lower lobe with debris occupying the   left lower lobe airways. An underlying pneumonia is not excluded.    Indeterminate lesion within the right acetabulum with associated osseous   erosion. Malignancy is not excluded.    Cholelithiasis and suspected choledocholithiasis. No biliary duct   dilation.    Thickening of the endometrial complex. Pelvic ultrasound may be performed   for further assessment.    Pancreatic tail lesion measuring 1 cm, possibly a side branch IPMN. This   can be further assessed with nonemergent contrast enhanced abdominal   MRI/MRCP.

## 2021-12-21 NOTE — PROGRESS NOTE ADULT - SUBJECTIVE AND OBJECTIVE BOX
CARDIOLOGY     PROGRESS  NOTE   ________________________________________________    CHIEF COMPLAINT:Patient is a 86y old  Female who presents with a chief complaint of fevers/sob (21 Dec 2021 08:22)  no complain.  	  REVIEW OF SYSTEMS:  CONSTITUTIONAL: No fever, weight loss, or fatigue  EYES: No eye pain, visual disturbances, or discharge  ENT:  No difficulty hearing, tinnitus, vertigo; No sinus or throat pain  NECK: No pain or stiffness  RESPIRATORY: No cough, wheezing, chills or hemoptysis; No Shortness of Breath  CARDIOVASCULAR: No chest pain, palpitations, passing out, dizziness, or leg swelling  GASTROINTESTINAL: No abdominal or epigastric pain. No nausea, vomiting, or hematemesis; No diarrhea or constipation. No melena or hematochezia.  GENITOURINARY: No dysuria, frequency, hematuria, or incontinence  NEUROLOGICAL: No headaches, memory loss, loss of strength, numbness, or tremors  SKIN: No itching, burning, rashes, or lesions   LYMPH Nodes: No enlarged glands  ENDOCRINE: No heat or cold intolerance; No hair loss  MUSCULOSKELETAL: No joint pain or swelling; No muscle, back, or extremity pain  PSYCHIATRIC: No depression, anxiety, mood swings, or difficulty sleeping  HEME/LYMPH: No easy bruising, or bleeding gums  ALLERGY AND IMMUNOLOGIC: No hives or eczema	    [ ] All others negative	  [ ] Unable to obtain    PHYSICAL EXAM:  T(C): 36.6 (12-21-21 @ 04:27), Max: 36.6 (12-21-21 @ 04:27)  HR: 89 (12-21-21 @ 06:09) (89 - 109)  BP: 134/70 (12-21-21 @ 04:27) (134/70 - 165/82)  RR: 18 (12-21-21 @ 04:27) (18 - 18)  SpO2: 98% (12-21-21 @ 06:09) (92% - 99%)  Wt(kg): --  I&O's Summary    20 Dec 2021 07:01  -  21 Dec 2021 07:00  --------------------------------------------------------  IN: 1350 mL / OUT: 1300 mL / NET: 50 mL        Appearance: Normal	  HEENT:   Normal oral mucosa, PERRL, EOMI	  Lymphatic: No lymphadenopathy  Cardiovascular: Normal S1 S2, No JVD, No murmurs, No edema  Respiratory: Lungs clear to auscultation	  Psychiatry: A & O x 3, Mood & affect appropriate  Gastrointestinal:  Soft, + diffuse tender, + BS	  Skin: No rashes, No ecchymoses, No cyanosis	  Neurologic: Non-focal  Extremities: Normal range of motion, No clubbing, cyanosis or edema  Vascular: Peripheral pulses palpable 2+ bilaterally    MEDICATIONS  (STANDING):  albuterol/ipratropium for Nebulization 3 milliLiter(s) Nebulizer every 6 hours  allopurinol 100 milliGRAM(s) Oral daily  amLODIPine   Tablet 10 milliGRAM(s) Oral daily  artificial  tears Solution 1 Drop(s) Both EYES two times a day  atorvastatin 20 milliGRAM(s) Oral at bedtime  dextrose 40% Gel 15 Gram(s) Oral once  dextrose 5%. 1000 milliLiter(s) (50 mL/Hr) IV Continuous <Continuous>  dextrose 5%. 1000 milliLiter(s) (100 mL/Hr) IV Continuous <Continuous>  dextrose 50% Injectable 25 Gram(s) IV Push once  dextrose 50% Injectable 12.5 Gram(s) IV Push once  dextrose 50% Injectable 25 Gram(s) IV Push once  glucagon  Injectable 1 milliGRAM(s) IntraMuscular once  heparin   Injectable 5000 Unit(s) SubCutaneous every 12 hours  insulin lispro (ADMELOG) corrective regimen sliding scale   SubCutaneous every 6 hours  lactated ringers. 1000 milliLiter(s) (60 mL/Hr) IV Continuous <Continuous>  levETIRAcetam  IVPB 750 milliGRAM(s) IV Intermittent every 12 hours  levothyroxine 75 MICROGram(s) Oral daily  metoprolol tartrate 25 milliGRAM(s) Oral two times a day  piperacillin/tazobactam IVPB.. 3.375 Gram(s) IV Intermittent every 8 hours      TELEMETRY: 	    ECG:  	  RADIOLOGY:  OTHER: 	  	  LABS:	 	    CARDIAC MARKERS:                                10.0   17.06 )-----------( 265      ( 20 Dec 2021 07:08 )             30.9     12-20    136  |  98  |  30<H>  ----------------------------<  256<H>  4.4   |  23  |  0.76    Ca    9.4      20 Dec 2021 07:05  Phos  3.4     12-20  Mg     2.6     12-20    TPro  7.5  /  Alb  3.7  /  TBili  0.2  /  DBili  x   /  AST  8<L>  /  ALT  13  /  AlkPhos  106  12-20    proBNP: Serum Pro-Brain Natriuretic Peptide: 375 pg/mL (12-19 @ 08:45)    Lipid Profile:   HgA1c:   TSH:     - No acute surgical intervention given perforated appendicitis with large associated abscess  - Recommend IR drainage of collection  - Continue antibiotics    Imaging reviewed: at this time limited percutaneous window and fluid appears to be loculated phlegmon rather than well formed collection    likely will require eventual drainage, however at this time, collection not fully formed.     Repeat imaging Wed - CT pelvis with IV contrast, NPO Tue midnight in preparation for possible drainage Wednesday afternoon pending CT.  < from: CT Abdomen and Pelvis w/ IV Cont (12.19.21 @ 21:40) >  Findings concerning for perforated appendicitis with a right lower   quadrant collection associated with the appendiceal tip.    Complete atelectasis of the left lower lobe with debris occupying the   left lower lobe airways. An underlying pneumonia is not excluded.    Indeterminate lesion within the right acetabulum with associated osseous   erosion. Malignancy is not excluded.    Cholelithiasis and suspected choledocholithiasis. No biliary duct   dilation.    Thickening of the endometrial complex. Pelvic ultrasound may be performed   for further assessment.    Pancreatic tail lesion measuring 1 cm, possibly a side branch IPMN. This   can be further assessed with nonemergent contrast enhanced abdominal   MRI/MRCP.    Findings were discussed with Dr. Wade 12/20/2021 9:14 AM by Dr. Alan with readback confirmation.    1) Perforated appendicitis  - Abscess in setting suspected perforated appendix  - Zosyn 3.375g q 8  - F/U surgery, ? role OR for source control  - IR eval  2) Positive BCX  - Likely contam, no apparent focus  - Repeat BCXs x 2  - Hold vanco for now (restart if clinical signs worsening)  3) Leukocytosis, Fever  - Trend to normal  - Monitor for alternate sources    Assessment and plan  ---------------------------  87 yo F from [facility], CVA with residual left weakness (~3 years ago), PEG for dysphagia, hypothyroid, COPD (on no home Oxygen), HTN, gout, p/w fever, Pt has respiratory distress, wheezing, concerning for respiratory infection, otherwise no other symptoms is reported on the chart. EMS gave solumedrol 125 through peripheral, small iv line.  pt is well known to me with hx of htn, ashd, s/p MI, cva with increasing sob on bipap in er.  can not get any hx from the pt.  sob ?respiratory failure ?sec to pneumoniae  ?pleural effusion, check ct chest no contrast  continue bp meds  dvt prophylaxis  abx  awaiting covid test  will consider to possible repeat echo  duoneb  dvt prophylaxis  echo  ct scan/ surgery/ ID noted  continue amlodipine and Metoprolol for now

## 2021-12-21 NOTE — PROGRESS NOTE ADULT - ASSESSMENT
85 yo F h/o CVA with residual L-sided weakness, dysphagia s/p PEG (2017), breast ca, MI, COPD presenting from rehab with fever and SOB with Staph epi bacteremia, currently being treated for pneumonia. Surgery consulted for CT showing for perforated appendicitis with 7.3cm intraabdominal collection. Patient hemodynamically stable, however with persistent oxygen requirements.    Plan:    - No acute surgical intervention given perforated appendicitis with large associated abscess  - Recommend IR drainage of collection  - Continue antibiotics        Green Team Surgery   [id number]

## 2021-12-21 NOTE — PROGRESS NOTE ADULT - PROBLEM SELECTOR PLAN 2
Concern for fever w/ hypoxia, sob w/ CT chest and CXR findings concerning for PNA, asp vs CAP, likely cause of hypoxia  - Will treat w/ zosyn for now  - chest PT, airway clearance  - BiPAP if worsening, stable VBG off BiPAP  - f/u urine legionella, can dc azithro if neg  - Bcx growing gram pos cocci, coag neg  - f/u TTE to r/o infective endocarditis  - f/u repeat cultures Concern for fever w/ hypoxia, sob w/ CT chest and CXR findings concerning for PNA, asp vs CAP, likely cause of hypoxia  - Will treat w/ zosyn for now  - chest PT, airway clearance  - BiPAP if worsening, stable VBG off BiPAP  - urine legionella neg  - Bcx growing gram pos cocci, coag neg,  likely contaminant as per ID  - f/u TTE to r/o infective endocarditis  - f/u repeat cultures  - wean off NC as tolerated

## 2021-12-21 NOTE — PROGRESS NOTE ADULT - ASSESSMENT
84 yo F with CVA, PEG, COPD, presenting with fevers, SOB  Leukocytosis, fever  CT concerning for perforated appendicitis, atelectasis, pancreatic lesion  BCX with CoNS--no apparent cardiac device (note L shoulder hardware in place)--3/4  Suspect CoNS is colonizer  Generally stable to improved today  Overall,  1) Perforated appendicitis  - Abscess in setting suspected perforated appendix  - Zosyn 3.375g q 8  - F/U surgery, ? role OR for source control  - IR eval  2) Positive BCX  - Likely contam, no apparent focus (Staph epi)  - F/U pending BCXs  - Hold vanco for now (restart if clinical signs worsening)  3) Leukocytosis, Fever  - Trend to normal  - Monitor for alternate sources    My colleagues will be covering this patient starting on 12/22/21. Please call [phone] or on call fellow with any questions or change in status.     Jaylen Ortiz MD  Pager [phone]  From 5pm-9am, and on weekends call [phone]

## 2021-12-21 NOTE — PROGRESS NOTE ADULT - SUBJECTIVE AND OBJECTIVE BOX
[facility]    REVIEW OF SYSTEMS:  GEN: no fever,    no chills  RESP: no SOB,   no cough  CVS: no chest pain,   no palpitations  GI: no abdominal pain,   no nausea,   no vomiting,   no constipation,   no diarrhea  : no dysuria,   no frequency  NEURO: no headache,   no dizziness  PSYCH: no depression,   not anxious  Derm : no rash    MEDICATIONS  (STANDING):  albuterol/ipratropium for Nebulization 3 milliLiter(s) Nebulizer every 6 hours  allopurinol 100 milliGRAM(s) Oral daily  amLODIPine   Tablet 10 milliGRAM(s) Oral daily  artificial  tears Solution 1 Drop(s) Both EYES two times a day  atorvastatin 20 milliGRAM(s) Oral at bedtime  dextrose 40% Gel 15 Gram(s) Oral once  dextrose 5%. 1000 milliLiter(s) (50 mL/Hr) IV Continuous <Continuous>  dextrose 5%. 1000 milliLiter(s) (100 mL/Hr) IV Continuous <Continuous>  dextrose 50% Injectable 25 Gram(s) IV Push once  dextrose 50% Injectable 12.5 Gram(s) IV Push once  dextrose 50% Injectable 25 Gram(s) IV Push once  glucagon  Injectable 1 milliGRAM(s) IntraMuscular once  heparin   Injectable 5000 Unit(s) SubCutaneous every 12 hours  insulin lispro (ADMELOG) corrective regimen sliding scale   SubCutaneous every 6 hours  lactated ringers. 1000 milliLiter(s) (60 mL/Hr) IV Continuous <Continuous>  levETIRAcetam  IVPB 750 milliGRAM(s) IV Intermittent every 12 hours  levothyroxine 75 MICROGram(s) Oral daily  metoprolol tartrate 25 milliGRAM(s) Oral two times a day  piperacillin/tazobactam IVPB.. 3.375 Gram(s) IV Intermittent every 8 hours    MEDICATIONS  (PRN):  acetaminophen     Tablet .. 650 milliGRAM(s) Oral every 6 hours PRN Temp greater or equal to 38C (100.4F), Mild Pain (1 - 3)  melatonin 3 milliGRAM(s) Oral at bedtime PRN Insomnia      Vital Signs Last 24 Hrs  T(C): 36.6 (21 Dec 2021 04:27), Max: 36.6 (21 Dec 2021 04:27)  T(F): 97.8 (21 Dec 2021 04:27), Max: 97.8 (21 Dec 2021 04:27)  HR: 89 (21 Dec 2021 06:09) (89 - 109)  BP: 134/70 (21 Dec 2021 04:27) (134/70 - 165/82)  BP(mean): --  RR: 18 (21 Dec 2021 04:27) (18 - 18)  SpO2: 98% (21 Dec 2021 06:09) (92% - 99%)  CAPILLARY BLOOD GLUCOSE      POCT Blood Glucose.: 315 mg/dL (21 Dec 2021 05:42)  POCT Blood Glucose.: 285 mg/dL (20 Dec 2021 23:38)  POCT Blood Glucose.: 303 mg/dL (20 Dec 2021 17:29)  POCT Blood Glucose.: 262 mg/dL (20 Dec 2021 12:05)    I&O's Summary    20 Dec 2021 07:01  -  21 Dec 2021 07:00  --------------------------------------------------------  IN: 1350 mL / OUT: 1300 mL / NET: 50 mL        PHYSICAL EXAM:  HEAD:  Atraumatic, Normocephalic  NECK: Supple, No   JVD  CHEST/LUNG:   no     rales,     no,    rhonchi  HEART: Regular rate and rhythm;         murmur  ABDOMEN: Soft,   mild discomfort. no guarding;   EXTREMITIES:        edema  NEUROLOGY:  alert    LABS:                        10.0   17.06 )-----------( 265      ( 20 Dec 2021 07:08 )             30.9     12-20    136  |  98  |  30<H>  ----------------------------<  256<H>  4.4   |  23  |  0.76    Ca    9.4      20 Dec 2021 07:05  Phos  3.4     12-20  Mg     2.6     12-20    TPro  7.5  /  Alb  3.7  /  TBili  0.2  /  DBili  x   /  AST  8<L>  /  ALT  13  /  AlkPhos  106  12-20          Urinalysis Basic - ( 19 Dec 2021 08:46 )    Color: Yellow / Appearance: Clear / S.021 / pH: x  Gluc: x / Ketone: Negative  / Bili: Negative / Urobili: 2 mg/dL   Blood: x / Protein: 30 mg/dL / Nitrite: Negative   Leuk Esterase: Negative / RBC: 1 /hpf / WBC 1 /HPF   Sq Epi: x / Non Sq Epi: 4 /hpf / Bacteria: Negative          19 @ 19:02  4.5  180              Consultant(s) Notes Reviewed:      Care Discussed with Consultants/Other Providers:

## 2021-12-21 NOTE — PROGRESS NOTE ADULT - SUBJECTIVE AND OBJECTIVE BOX
85 yo F from [facility], CVA with residual left weakness (~3 years ago), PEG for dysphagia, hypothyroid, COPD (on no home Oxygen), HTN, gout, admitted 12/19/21 with fever and SOB  She had right simple mastectomy in 4/2019.  Any Rx after that is unknown. 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 650 milliGRAM(s) Oral every 6 hours PRN Temp greater or equal to 38C (100.4F), Mild Pain (1 - 3)  albuterol/ipratropium for Nebulization 3 milliLiter(s) Nebulizer every 6 hours  allopurinol 100 milliGRAM(s) Oral daily  amLODIPine   Tablet 10 milliGRAM(s) Oral daily  artificial  tears Solution 1 Drop(s) Both EYES two times a day  atorvastatin 20 milliGRAM(s) Oral at bedtime  dextrose 40% Gel 15 Gram(s) Oral once  dextrose 5%. 1000 milliLiter(s) IV Continuous <Continuous>  dextrose 5%. 1000 milliLiter(s) IV Continuous <Continuous>  dextrose 50% Injectable 25 Gram(s) IV Push once  dextrose 50% Injectable 12.5 Gram(s) IV Push once  dextrose 50% Injectable 25 Gram(s) IV Push once  glucagon  Injectable 1 milliGRAM(s) IntraMuscular once  heparin   Injectable 5000 Unit(s) SubCutaneous every 12 hours  insulin lispro (ADMELOG) corrective regimen sliding scale   SubCutaneous every 6 hours  lactated ringers. 1000 milliLiter(s) IV Continuous <Continuous>  levETIRAcetam  Solution 750 milliGRAM(s) Oral two times a day  levothyroxine 75 MICROGram(s) Oral daily  melatonin 3 milliGRAM(s) Oral at bedtime PRN Insomnia  metoprolol tartrate 25 milliGRAM(s) Oral two times a day  piperacillin/tazobactam IVPB.. 3.375 Gram(s) IV Intermittent every 8 hours    Labs:  CBC Full  -  ( 20 Dec 2021 07:08 )  WBC Count : 17.06 K/uL  Hemoglobin : 10.0 g/dL  Hematocrit : 30.9 %  Platelet Count - Automated : 265 K/uL  Mean Cell Volume : 100.3 fl  Mean Cell Hemoglobin : 32.5 pg  Mean Cell Hemoglobin Concentration : 32.4 gm/dL  Auto Neutrophil # : x  Auto Lymphocyte # : x  Auto Monocyte # : x  Auto Eosinophil # : x  Auto Basophil # : x  Auto Neutrophil % : x  Auto Lymphocyte % : x  Auto Monocyte % : x  Auto Eosinophil % : x  Auto Basophil % : x    12-20    136  |  98  |  30<H>  ----------------------------<  256<H>  4.4   |  23  |  0.76    Ca    9.4      20 Dec 2021 07:05  Phos  3.4     12-20  Mg     2.6     12-20    TPro  7.5  /  Alb  3.7  /  TBili  0.2  /  DBili  x   /  AST  8<L>  /  ALT  13  /  AlkPhos  106  12-20      Radiology:             ROS:  Patient comfortable without distress  No SOB or chest pain  No palpitation  No abdominal pain, diarrhaea or constipation  No weakness of extremities  No skin changes or swelling of legs    Vital Signs Last 24 Hrs  T(C): 36.6 (21 Dec 2021 04:27), Max: 36.6 (21 Dec 2021 04:27)  T(F): 97.8 (21 Dec 2021 04:27), Max: 97.8 (21 Dec 2021 04:27)  HR: 96 (21 Dec 2021 09:40) (89 - 109)  BP: 134/70 (21 Dec 2021 04:27) (134/70 - 165/82)  BP(mean): --  RR: 18 (21 Dec 2021 04:27) (18 - 18)  SpO2: 96% (21 Dec 2021 09:40) (92% - 99%)    Physical exam:  Patient alert and oriented  No distress  CVS: S1, S2 regular or murmur  Chest: bilateral breath sound without rales  Abdomen: soft, not tender, no organomegaly or masses  No focal neuro deficit  No edema      Assessment and Plan: 87 yo F from [facility], CVA with residual left weakness (~3 years ago), PEG for dysphagia, hypothyroid, COPD (on no home Oxygen), HTN, gout, admitted 12/19/21 with fever and SOB  She had right simple mastectomy in 4/2019.  Any Rx after that is unknown. 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 650 milliGRAM(s) Oral every 6 hours PRN Temp greater or equal to 38C (100.4F), Mild Pain (1 - 3)  albuterol/ipratropium for Nebulization 3 milliLiter(s) Nebulizer every 6 hours  allopurinol 100 milliGRAM(s) Oral daily  amLODIPine   Tablet 10 milliGRAM(s) Oral daily  artificial  tears Solution 1 Drop(s) Both EYES two times a day  atorvastatin 20 milliGRAM(s) Oral at bedtime  dextrose 40% Gel 15 Gram(s) Oral once  dextrose 5%. 1000 milliLiter(s) IV Continuous <Continuous>  dextrose 5%. 1000 milliLiter(s) IV Continuous <Continuous>  dextrose 50% Injectable 25 Gram(s) IV Push once  dextrose 50% Injectable 12.5 Gram(s) IV Push once  dextrose 50% Injectable 25 Gram(s) IV Push once  glucagon  Injectable 1 milliGRAM(s) IntraMuscular once  heparin   Injectable 5000 Unit(s) SubCutaneous every 12 hours  insulin lispro (ADMELOG) corrective regimen sliding scale   SubCutaneous every 6 hours  lactated ringers. 1000 milliLiter(s) IV Continuous <Continuous>  levETIRAcetam  Solution 750 milliGRAM(s) Oral two times a day  levothyroxine 75 MICROGram(s) Oral daily  melatonin 3 milliGRAM(s) Oral at bedtime PRN Insomnia  metoprolol tartrate 25 milliGRAM(s) Oral two times a day  piperacillin/tazobactam IVPB.. 3.375 Gram(s) IV Intermittent every 8 hours    Labs:  CBC Full  -  ( 20 Dec 2021 07:08 )  WBC Count : 17.06 K/uL  Hemoglobin : 10.0 g/dL  Hematocrit : 30.9 %  Platelet Count - Automated : 265 K/uL  Mean Cell Volume : 100.3 fl  Mean Cell Hemoglobin : 32.5 pg  Mean Cell Hemoglobin Concentration : 32.4 gm/dL  Auto Neutrophil # : x  Auto Lymphocyte # : x  Auto Monocyte # : x  Auto Eosinophil # : x  Auto Basophil # : x  Auto Neutrophil % : x  Auto Lymphocyte % : x  Auto Monocyte % : x  Auto Eosinophil % : x  Auto Basophil % : x    12-20    136  |  98  |  30<H>  ----------------------------<  256<H>  4.4   |  23  |  0.76    Ca    9.4      20 Dec 2021 07:05  Phos  3.4     12-20  Mg     2.6     12-20    TPro  7.5  /  Alb  3.7  /  TBili  0.2  /  DBili  x   /  AST  8<L>  /  ALT  13  /  AlkPhos  106  12-20      Radiology:             ROS:  Patient comfortable without distress  Does not give much history or complains  Vital Signs Last 24 Hrs  T(C): 36.6 (21 Dec 2021 04:27), Max: 36.6 (21 Dec 2021 04:27)  T(F): 97.8 (21 Dec 2021 04:27), Max: 97.8 (21 Dec 2021 04:27)  HR: 96 (21 Dec 2021 09:40) (89 - 109)  BP: 134/70 (21 Dec 2021 04:27) (134/70 - 165/82)  BP(mean): --  RR: 18 (21 Dec 2021 04:27) (18 - 18)  SpO2: 96% (21 Dec 2021 09:40) (92% - 99%)    Physical exam:  Patient alert and oriented  No distress, obese  CVS: S1, S2 regular or murmur  Chest: bilateral breath sound without rales  Abdomen: soft, mild lower quadrant tenderness,  no organomegaly or masses, PEG  No focal neuro deficit  No edema      Assessment and Plan:

## 2021-12-21 NOTE — PROGRESS NOTE ADULT - ASSESSMENT
85 yo F from [facility], CVA with residual left weakness (~3 years ago), PEG for dysphagia, hypothyroid, COPD (on no home Oxygen), HTN, gout, admitted 12/19/21 with fever and SOB  She had right simple mastectomy in 4/2019.  Any Rx after that is unknown. CT noted for acetabular lesion and pancreatic IPMN  There is concern for perforated appendix with collection    Patient is s/p right simple mastectomy in 4/2019 for right breast [id number] breast cancer, ER, [place] positive and Her-2 negative.   She would ideally have received adjuvant hormonal therapy.  At this time needs management of possible perforated appendix with collection, ID, surgery and IR saw patient. She is on abx  Eventually can get bone scan and further imaging of hip as needed.   If looks metastatic will use hormonal therapy PO    87 yo F from [facility], CVA with residual left weakness (~3 years ago), PEG for dysphagia, hypothyroid, COPD (on no home Oxygen), HTN, gout, admitted 12/19/21 with fever and SOB  She had right simple mastectomy in 4/2019.  Any Rx after that is unknown. CT noted for acetabular lesion and pancreatic IPMN  There is concern for perforated appendix with collection    Patient is s/p right simple mastectomy in 4/2019 for right breast [id number] breast cancer, ER, [place] positive and Her-2 negative.   She would ideally have received adjuvant hormonal therapy.  At this time needs management of possible perforated appendix with collection, ID, surgery and IR saw patient. She is on abx  Eventually can get bone scan and further imaging of hip as needed.   If looks metastatic will use hormonal therapy thru PEG

## 2021-12-21 NOTE — PROGRESS NOTE ADULT - ASSESSMENT
86   year old female      h/o hemorrhagic CVA, has  PEG,  HTN, MI,      HLD, gout   right Ca breast. s/p mastectomy in 2019,  s/p  sentinel node  localization.  4/4,  were  negative         *  p/w SOB and  acute respiratory distress    was  on   bipap  in er   with  elevated wbc of 17,000 on arrival,  from pna/  probable aspiration/ gram negative pna  cxr,? pl effusion, right  lung opacity  CT c, ordered  *  dementia , mild   *   s/p cva, has  PEG,  npo/,  on  synthroid, Keppra  ,  *  HTN, on meds  per  card  prior  echo,  normal ef  *  h/o ca breast. /, s/p  R  mastectomy  *  bacteremia. / staph epi, meth R. may be  a  contaminant  spoke  with ID d r oey  ct  c/a/p, ?  pna, perforated  appendicitis,  ?  mets  to  acetabulum   surg/ IR  and  oncology eval dr pacheco    appreciate  excellent  care of  house  staff  per  IR,  awaiting rpt ct a/p  in am  per  surg, no  intervention   follow rpt blood   c/s.  on iv  zosyn   on iv fluids/  hyperglycemia   noted, prednisone stopped     rd< from: Xray Chest 1 View- PORTABLE-Urgent (12.19.21 @ 09:20) >  IMPRESSION:  Low lung volumes. New small left lower lung hazy opacity may represent   atelectasis versus pleural effusion. Redemonstration of right upper lung   perihilar opacity largely unchanged prior exam.  --- End of Report --  < end of copied text >

## 2021-12-21 NOTE — PROGRESS NOTE ADULT - PROBLEM SELECTOR PLAN 7
Incidental finding in CTAP w/ IV contrast  - f/u onc recs Incidental finding in CTAP w/ IV contrast  - f/u onc recs  - possible bone scan

## 2021-12-21 NOTE — PROGRESS NOTE ADULT - PROBLEM SELECTOR PLAN 6
Incidental finding in CTAP w/ IV contrast  - f/u onc recs Incidental finding in CTAP w/ IV contrast, w/ lesion in R acetabulum   - f/u onc recs, possible bone scan

## 2021-12-22 NOTE — CHART NOTE - NSCHARTNOTEFT_GEN_A_CORE
Patient was off of the floor at time of my visit  ------------------------------------------------------------    84 yo F with CVA, PEG, COPD, presenting with fevers, SOB  Leukocytosis, fever  CT concerning for perforated appendicitis, atelectasis, pancreatic lesion  BCX with CoNS--no apparent cardiac device (note L shoulder hardware in place)--3/4  Suspect CoNS is colonizer  Generally stable to improved today  Overall,  1) Perforated appendicitis  - Abscess in setting suspected perforated appendix  - Zosyn 3.375g q 8  - F/U surgery, ?role OR for source control  - IR eval  2) Positive BCX  - Likely contam, no apparent focus (Staph epi)  - F/U pending BCXs  - Hold vanco for now (restart if clinical signs worsening)  3) Leukocytosis, Fever  - Trend to normal  - Monitor for alternate sources    I will continue to follow. Please feel free to contact me with any further questions.    Justice Corona M.D.  [facility] Division of Infectious Disease  8AM-5PM: Pager Number [phone]  After Hours (or if no response): Please contact the Infectious Diseases Office at [phone]

## 2021-12-22 NOTE — PROGRESS NOTE ADULT - PROBLEM SELECTOR PLAN 6
Incidental finding in CTAP w/ IV contrast, w/ lesion in R acetabulum   - f/u onc recs, possible bone scan

## 2021-12-22 NOTE — PROGRESS NOTE ADULT - SUBJECTIVE AND OBJECTIVE BOX
Patient is a 86y old  Female who presents with a chief complaint of fevers/sob (22 Dec 2021 03:52)      SUBJECTIVE / OVERNIGHT EVENTS:    MEDICATIONS  (STANDING):  albuterol/ipratropium for Nebulization 3 milliLiter(s) Nebulizer every 6 hours  allopurinol 100 milliGRAM(s) Oral daily  amLODIPine   Tablet 10 milliGRAM(s) Oral daily  artificial  tears Solution 1 Drop(s) Both EYES two times a day  atorvastatin 20 milliGRAM(s) Oral at bedtime  dextrose 40% Gel 15 Gram(s) Oral once  dextrose 5%. 1000 milliLiter(s) (50 mL/Hr) IV Continuous <Continuous>  dextrose 5%. 1000 milliLiter(s) (100 mL/Hr) IV Continuous <Continuous>  dextrose 50% Injectable 25 Gram(s) IV Push once  dextrose 50% Injectable 12.5 Gram(s) IV Push once  dextrose 50% Injectable 25 Gram(s) IV Push once  glucagon  Injectable 1 milliGRAM(s) IntraMuscular once  heparin   Injectable 5000 Unit(s) SubCutaneous every 12 hours  insulin lispro (ADMELOG) corrective regimen sliding scale   SubCutaneous every 6 hours  lactated ringers. 1000 milliLiter(s) (60 mL/Hr) IV Continuous <Continuous>  levETIRAcetam  Solution 750 milliGRAM(s) Oral two times a day  levothyroxine 75 MICROGram(s) Oral daily  metoprolol tartrate 25 milliGRAM(s) Oral two times a day  piperacillin/tazobactam IVPB.. 3.375 Gram(s) IV Intermittent every 8 hours    MEDICATIONS  (PRN):  acetaminophen     Tablet .. 650 milliGRAM(s) Oral every 6 hours PRN Temp greater or equal to 38C (100.4F), Mild Pain (1 - 3)  melatonin 3 milliGRAM(s) Oral at bedtime PRN Insomnia      Vital Signs Last 24 Hrs  T(C): 36.5 (22 Dec 2021 04:38), Max: 36.5 (22 Dec 2021 04:38)  T(F): 97.7 (22 Dec 2021 04:38), Max: 97.7 (22 Dec 2021 04:38)  HR: 102 (22 Dec 2021 05:27) (86 - 111)  BP: 118/70 (22 Dec 2021 04:38) (117/74 - 148/76)  BP(mean): --  RR: 18 (22 Dec 2021 04:38) (18 - 18)  SpO2: 96% (22 Dec 2021 05:27) (93% - 99%)  CAPILLARY BLOOD GLUCOSE      POCT Blood Glucose.: 154 mg/dL (22 Dec 2021 06:31)  POCT Blood Glucose.: 185 mg/dL (21 Dec 2021 23:47)  POCT Blood Glucose.: 242 mg/dL (21 Dec 2021 17:53)  POCT Blood Glucose.: 235 mg/dL (21 Dec 2021 12:24)    I&O's Summary    21 Dec 2021 07:01  -  22 Dec 2021 07:00  --------------------------------------------------------  IN: 10 mL / OUT: 1150 mL / NET: -1140 mL        PHYSICAL EXAM:  GENERAL: NAD, well-developed, stable on NC  HEAD:  Atraumatic, Normocephalic  EYES: EOMI, PERRLA, conjunctiva and sclera clear  NECK: Supple, No JVD  CHEST/LUNG: Clear to auscultation bilaterally; No wheeze  HEART: Regular rate and rhythm; No murmurs, rubs, or gallops  ABDOMEN: Abdomen: soft, softly distended, mild periumbilical tenderness, no rebound or guarding, LUQ PEG in place with mild erythema and signs of excoriation over insertion site  EXTREMITIES:  2+ Peripheral Pulses, No clubbing, cyanosis, or edema  PSYCH: AAOx3  NEUROLOGY: non-focal  SKIN: No rashes or lesions    LABS:                        9.8    13.48 )-----------( 293      ( 21 Dec 2021 11:54 )             31.3     12-21    141  |  101  |  29<H>  ----------------------------<  219<H>  4.1   |  23  |  0.71    Ca    8.8      21 Dec 2021 11:54  Phos  3.1     12-21  Mg     2.6     12-21    TPro  7.2  /  Alb  3.7  /  TBili  0.2  /  DBili  x   /  AST  10  /  ALT  15  /  AlkPhos  94  12-21    PT/INR - ( 21 Dec 2021 11:54 )   PT: 12.1 sec;   INR: 1.01 ratio         PTT - ( 21 Dec 2021 11:54 )  PTT:27.6 sec    RADIOLOGY & ADDITIONAL TESTS:    Imaging Personally Reviewed:    Consultant(s) Notes Reviewed:      Care Discussed with Consultants/Other Providers:   Patient is a 86y old  Female who presents with a chief complaint of fevers/sob (22 Dec 2021 03:52)      SUBJECTIVE / OVERNIGHT EVENTS:    Pt reports no acute events overnight, reports feeling better. Denies fevers, chills, chest p/p, sob, n/v/d.     MEDICATIONS  (STANDING):  albuterol/ipratropium for Nebulization 3 milliLiter(s) Nebulizer every 6 hours  allopurinol 100 milliGRAM(s) Oral daily  amLODIPine   Tablet 10 milliGRAM(s) Oral daily  artificial  tears Solution 1 Drop(s) Both EYES two times a day  atorvastatin 20 milliGRAM(s) Oral at bedtime  dextrose 40% Gel 15 Gram(s) Oral once  dextrose 5%. 1000 milliLiter(s) (50 mL/Hr) IV Continuous <Continuous>  dextrose 5%. 1000 milliLiter(s) (100 mL/Hr) IV Continuous <Continuous>  dextrose 50% Injectable 25 Gram(s) IV Push once  dextrose 50% Injectable 12.5 Gram(s) IV Push once  dextrose 50% Injectable 25 Gram(s) IV Push once  glucagon  Injectable 1 milliGRAM(s) IntraMuscular once  heparin   Injectable 5000 Unit(s) SubCutaneous every 12 hours  insulin lispro (ADMELOG) corrective regimen sliding scale   SubCutaneous every 6 hours  lactated ringers. 1000 milliLiter(s) (60 mL/Hr) IV Continuous <Continuous>  levETIRAcetam  Solution 750 milliGRAM(s) Oral two times a day  levothyroxine 75 MICROGram(s) Oral daily  metoprolol tartrate 25 milliGRAM(s) Oral two times a day  piperacillin/tazobactam IVPB.. 3.375 Gram(s) IV Intermittent every 8 hours    MEDICATIONS  (PRN):  acetaminophen     Tablet .. 650 milliGRAM(s) Oral every 6 hours PRN Temp greater or equal to 38C (100.4F), Mild Pain (1 - 3)  melatonin 3 milliGRAM(s) Oral at bedtime PRN Insomnia      Vital Signs Last 24 Hrs  T(C): 36.5 (22 Dec 2021 04:38), Max: 36.5 (22 Dec 2021 04:38)  T(F): 97.7 (22 Dec 2021 04:38), Max: 97.7 (22 Dec 2021 04:38)  HR: 102 (22 Dec 2021 05:27) (86 - 111)  BP: 118/70 (22 Dec 2021 04:38) (117/74 - 148/76)  BP(mean): --  RR: 18 (22 Dec 2021 04:38) (18 - 18)  SpO2: 96% (22 Dec 2021 05:27) (93% - 99%)  CAPILLARY BLOOD GLUCOSE      POCT Blood Glucose.: 154 mg/dL (22 Dec 2021 06:31)  POCT Blood Glucose.: 185 mg/dL (21 Dec 2021 23:47)  POCT Blood Glucose.: 242 mg/dL (21 Dec 2021 17:53)  POCT Blood Glucose.: 235 mg/dL (21 Dec 2021 12:24)    I&O's Summary    21 Dec 2021 07:01  -  22 Dec 2021 07:00  --------------------------------------------------------  IN: 10 mL / OUT: 1150 mL / NET: -1140 mL      PHYSICAL EXAM:  GENERAL: NAD, well-developed, stable on NC  HEAD:  Atraumatic, Normocephalic  EYES: EOMI, PERRLA, conjunctiva and sclera clear  NECK: Supple, No JVD  CHEST/LUNG: Clear to auscultation bilaterally; No wheeze  HEART: Regular rate and rhythm; No murmurs, rubs, or gallops  ABDOMEN: Abdomen: soft, softly distended, mild periumbilical tenderness, no rebound or guarding, LUQ PEG in place with mild erythema and signs of excoriation over insertion site  EXTREMITIES:  2+ Peripheral Pulses, No clubbing, cyanosis, or edema  PSYCH: AAOx3  NEUROLOGY: non-focal  SKIN: No rashes or lesions    LABS:                        9.8    13.48 )-----------( 293      ( 21 Dec 2021 11:54 )             31.3     12-21    141  |  101  |  29<H>  ----------------------------<  219<H>  4.1   |  23  |  0.71    Ca    8.8      21 Dec 2021 11:54  Phos  3.1     12-21  Mg     2.6     12-21    TPro  7.2  /  Alb  3.7  /  TBili  0.2  /  DBili  x   /  AST  10  /  ALT  15  /  AlkPhos  94  12-21    PT/INR - ( 21 Dec 2021 11:54 )   PT: 12.1 sec;   INR: 1.01 ratio         PTT - ( 21 Dec 2021 11:54 )  PTT:27.6 sec    RADIOLOGY & ADDITIONAL TESTS:    Imaging Personally Reviewed:    Consultant(s) Notes Reviewed:      Care Discussed with Consultants/Other Providers:

## 2021-12-22 NOTE — PHYSICAL THERAPY INITIAL EVALUATION ADULT - PERTINENT HX OF CURRENT PROBLEM, REHAB EVAL
Pt is a 85 yo F h/o CVA with residual L-sided weakness, dysphagia s/p PEG (2017), breast ca, MI, COPD presenting from rehab with fever and SOB with Staph epi bacteremia, currently being treated for pneumonia. CT showing for perforated appendicitis with 7.3cm intraabdominal collection.

## 2021-12-22 NOTE — PHYSICAL THERAPY INITIAL EVALUATION ADULT - PASSIVE RANGE OF MOTION EXAMINATION, REHAB EVAL
left UE and LE contractures noted/Right UE Passive ROM was WFL (within functional limits)/Right LE Passive ROM was WFL (within functional limits)

## 2021-12-22 NOTE — PROGRESS NOTE ADULT - SUBJECTIVE AND OBJECTIVE BOX
[facility] Progress Note    INTERVAL EVENTS:   No acute events overnight.    SUBJECTIVE: Patient seen and examined at bedside with surgical team, patient mild abdominal pain. Denies fever, chills, CP, SOB nausea, vomiting.     OBJECTIVE:    Vital Signs Last 24 Hrs  T(C): 36.4 (21 Dec 2021 20:43), Max: 36.6 (21 Dec 2021 04:27)  T(F): 97.5 (21 Dec 2021 20:43), Max: 97.8 (21 Dec 2021 04:27)  HR: 99 (21 Dec 2021 20:43) (86 - 111)  BP: 117/74 (21 Dec 2021 20:43) (117/74 - 148/76)  BP(mean): --  RR: 18 (21 Dec 2021 20:43) (18 - 18)  SpO2: 93% (21 Dec 2021 20:43) (93% - 99%)  I&O's Detail    20 Dec 2021 07:01  -  21 Dec 2021 07:00  --------------------------------------------------------  IN:    Free Water: 150 mL    IV PiggyBack: 200 mL    Jevity 1.2: 1000 mL  Total IN: 1350 mL    OUT:    Voided (mL): 1300 mL  Total OUT: 1300 mL    Total NET: 50 mL      21 Dec 2021 07:01  -  22 Dec 2021 03:54  --------------------------------------------------------  IN:    Jevity 1.2: 10 mL  Total IN: 10 mL    OUT:    Voided (mL): 350 mL  Total OUT: 350 mL    Total NET: -340 mL      --------------------------------------------------------    PHYSICAL EXAM:  General: AAOx3, no acute distress.  Respiratory: on 6L NC with breathing treatment ongoing  Abdomen: soft, softly distended, mild periumbilical tenderness, no rebound or guarding, LUQ PEG in place with mild erythema and signs of excoriation over insertion site  Extremities: LLE contracted, LUE weakness        MEDICATIONS  (STANDING):  albuterol/ipratropium for Nebulization 3 milliLiter(s) Nebulizer every 6 hours  allopurinol 100 milliGRAM(s) Oral daily  amLODIPine   Tablet 10 milliGRAM(s) Oral daily  atorvastatin 20 milliGRAM(s) Oral at bedtime  azithromycin  IVPB 500 milliGRAM(s) IV Intermittent every 24 hours  dextrose 40% Gel 15 Gram(s) Oral once  dextrose 5%. 1000 milliLiter(s) (50 mL/Hr) IV Continuous <Continuous>  dextrose 5%. 1000 milliLiter(s) (100 mL/Hr) IV Continuous <Continuous>  dextrose 50% Injectable 25 Gram(s) IV Push once  dextrose 50% Injectable 12.5 Gram(s) IV Push once  dextrose 50% Injectable 25 Gram(s) IV Push once  glucagon  Injectable 1 milliGRAM(s) IntraMuscular once  heparin   Injectable 5000 Unit(s) SubCutaneous every 12 hours  insulin lispro (ADMELOG) corrective regimen sliding scale   SubCutaneous every 6 hours  levETIRAcetam  IVPB 750 milliGRAM(s) IV Intermittent every 12 hours  levothyroxine 75 MICROGram(s) Oral daily  metoprolol tartrate 25 milliGRAM(s) Oral two times a day  piperacillin/tazobactam IVPB.. 3.375 Gram(s) IV Intermittent every 8 hours  predniSONE   Tablet 40 milliGRAM(s) Oral daily  sodium chloride 0.9%. 1000 milliLiter(s) (75 mL/Hr) IV Continuous <Continuous>    MEDICATIONS  (PRN):  acetaminophen     Tablet .. 650 milliGRAM(s) Oral every 6 hours PRN Temp greater or equal to 38C (100.4F), Mild Pain (1 - 3)  LABS:                        9.8    13.48 )-----------( 293      ( 21 Dec 2021 11:54 )             31.3     12-21    141  |  101  |  29<H>  ----------------------------<  219<H>  4.1   |  23  |  0.71    Ca    8.8      21 Dec 2021 11:54  Phos  3.1     12-21  Mg     2.6     12-21    TPro  7.2  /  Alb  3.7  /  TBili  0.2  /  DBili  x   /  AST  10  /  ALT  15  /  AlkPhos  94  12-21    PT/INR - ( 21 Dec 2021 11:54 )   PT: 12.1 sec;   INR: 1.01 ratio         PTT - ( 21 Dec 2021 11:54 )  PTT:27.6 sec          Culture - Urine (collected 19 Dec 2021 17:57)  Source: Clean Catch Clean Catch (Midstream)  Final Report (20 Dec 2021 13:36):    <10,000 CFU/mL Normal Urogenital Regina    Culture - Blood (collected 19 Dec 2021 14:24)  Source: .Blood Blood-Venous  Gram Stain (20 Dec 2021 10:46):    Growth in aerobic bottle: Gram Positive Cocci in Clusters  Preliminary Report (21 Dec 2021 11:19):    Growth in aerobic bottle: Staphylococcus epidermidis Susceptibility to    follow.    Culture - Blood (collected 19 Dec 2021 14:24)  Source: .Blood Blood  Gram Stain (20 Dec 2021 12:19):    Growth in aerobic bottle: Gram Positive Cocci in Clusters    Growth in anaerobic bottle: Gram Positive Cocci in Clusters  Final Report (21 Dec 2021 10:16):    Growth in aerobic and anaerobic bottles: Staphylococcus epidermidis Coag    Negative Staphylococcus    Single set isolate, possible contaminant. Contact    Microbiology if susceptibility testing clinically    indicated.    ***Blood Panel PCR results on this specimen are available    approximately 3 hours after the Gram stain result.***    Gram stain, PCR, and/or culture results may not always    correspond due to difference in methodologies.    ************************************************************    This PCR assay was performed by multiplex PCR. This    Assay tests for 66 bacterial and resistance gene targets.    Please refer to the [facility] test directory    at [email or web address] for details.  Organism: Blood Culture PCR (21 Dec 2021 10:16)  Organism: Blood Culture PCR (21 Dec 2021 10:16)    melatonin 3 milliGRAM(s) Oral at bedtime PRN Insomnia

## 2021-12-22 NOTE — PROGRESS NOTE ADULT - PROBLEM SELECTOR PLAN 2
Concern for fever w/ hypoxia, sob w/ CT chest and CXR findings concerning for PNA, asp vs CAP, likely cause of hypoxia  - Will treat w/ zosyn for now  - chest PT, airway clearance  - BiPAP if worsening, stable VBG off BiPAP  - urine legionella neg  - Bcx growing gram pos cocci, coag neg,  likely contaminant as per ID  - f/u TTE to r/o infective endocarditis  - f/u repeat cultures  - wean off NC as tolerated

## 2021-12-22 NOTE — PROGRESS NOTE ADULT - PROBLEM SELECTOR PLAN 3
H/o COPD, no home O2, now presenting w/ hypoxia, concern for COPD exacerbation  - prednisone stopped 2/2 hyperglycemia  - c/w zosyn  - airway clearance and chest PT

## 2021-12-22 NOTE — PROGRESS NOTE ADULT - PROBLEM SELECTOR PLAN 4
Likely 2/2 to CVA, s/p PEG by GI 4 years ago  - c/w PEG feeding  - Dietician recs appreciated   - NPO @ midnight on 12/22

## 2021-12-22 NOTE — PROGRESS NOTE ADULT - ASSESSMENT
85 yo F h/o CVA with residual L-sided weakness, dysphagia s/p PEG (2017), breast ca, MI, COPD presenting from rehab with fever and SOB with Staph epi bacteremia, currently being treated for pneumonia. Surgery consulted for CT showing for perforated appendicitis with 7.3cm intraabdominal collection. Patient hemodynamically stable, however with persistent oxygen requirements.    Plan:    - No acute surgical intervention given perforated appendicitis with large associated abscess  - Recommend IR drainage of collection  - Continue antibiotics        [facility] Surgery   [id number]

## 2021-12-22 NOTE — PROGRESS NOTE ADULT - SUBJECTIVE AND OBJECTIVE BOX
afebrile  REVIEW OF SYSTEMS:  GEN: no fever,    no chills  RESP: no SOB,   no cough  CVS: no chest pain,   no palpitations  GI: no abdominal pain,   no nausea,   no vomiting,   no constipation,   no diarrhea  : no dysuria,   no frequency  NEURO: no headache,   no dizziness  PSYCH: no depression,   not anxious  Derm : no rash    MEDICATIONS  (STANDING):  albuterol/ipratropium for Nebulization 3 milliLiter(s) Nebulizer every 6 hours  allopurinol 100 milliGRAM(s) Oral daily  amLODIPine   Tablet 10 milliGRAM(s) Oral daily  artificial  tears Solution 1 Drop(s) Both EYES two times a day  atorvastatin 20 milliGRAM(s) Oral at bedtime  dextrose 40% Gel 15 Gram(s) Oral once  dextrose 5%. 1000 milliLiter(s) (50 mL/Hr) IV Continuous <Continuous>  dextrose 5%. 1000 milliLiter(s) (100 mL/Hr) IV Continuous <Continuous>  dextrose 50% Injectable 25 Gram(s) IV Push once  dextrose 50% Injectable 12.5 Gram(s) IV Push once  dextrose 50% Injectable 25 Gram(s) IV Push once  glucagon  Injectable 1 milliGRAM(s) IntraMuscular once  heparin   Injectable 5000 Unit(s) SubCutaneous every 12 hours  insulin lispro (ADMELOG) corrective regimen sliding scale   SubCutaneous every 6 hours  lactated ringers. 1000 milliLiter(s) (60 mL/Hr) IV Continuous <Continuous>  levETIRAcetam  Solution 750 milliGRAM(s) Oral two times a day  levothyroxine 75 MICROGram(s) Oral daily  metoprolol tartrate 25 milliGRAM(s) Oral two times a day  piperacillin/tazobactam IVPB.. 3.375 Gram(s) IV Intermittent every 8 hours    MEDICATIONS  (PRN):  acetaminophen     Tablet .. 650 milliGRAM(s) Oral every 6 hours PRN Temp greater or equal to 38C (100.4F), Mild Pain (1 - 3)  melatonin 3 milliGRAM(s) Oral at bedtime PRN Insomnia      Vital Signs Last 24 Hrs  T(C): 36.5 (22 Dec 2021 04:38), Max: 36.5 (22 Dec 2021 04:38)  T(F): 97.7 (22 Dec 2021 04:38), Max: 97.7 (22 Dec 2021 04:38)  HR: 102 (22 Dec 2021 05:27) (86 - 111)  BP: 118/70 (22 Dec 2021 04:38) (117/74 - 148/76)  BP(mean): --  RR: 18 (22 Dec 2021 04:38) (18 - 18)  SpO2: 96% (22 Dec 2021 05:27) (93% - 99%)  CAPILLARY BLOOD GLUCOSE      POCT Blood Glucose.: 154 mg/dL (22 Dec 2021 06:31)  POCT Blood Glucose.: 185 mg/dL (21 Dec 2021 23:47)  POCT Blood Glucose.: 242 mg/dL (21 Dec 2021 17:53)  POCT Blood Glucose.: 235 mg/dL (21 Dec 2021 12:24)    I&O's Summary    21 Dec 2021 07:01  -  22 Dec 2021 07:00  --------------------------------------------------------  IN: 10 mL / OUT: 1150 mL / NET: -1140 mL        PHYSICAL EXAM:  HEAD:  Atraumatic, Normocephalic  NECK: Supple, No   JVD  CHEST/LUNG:   no     rales,     no,    rhonchi  HEART: Regular rate and rhythm;         murmur  ABDOMEN: Soft, Nontender, ;   EXTREMITIES:   no     edema  NEUROLOGY:  alert    LABS:                        10.0   13.06 )-----------( 273      ( 22 Dec 2021 07:05 )             31.7     12-22    143  |  104  |  22  ----------------------------<  153<H>  3.7   |  23  |  0.69    Ca    9.2      22 Dec 2021 07:05  Phos  2.7     12-22  Mg     2.3     12-22    TPro  7.1  /  Alb  3.6  /  TBili  0.3  /  DBili  x   /  AST  11  /  ALT  15  /  AlkPhos  90  12-22    PT/INR - ( 21 Dec 2021 11:54 )   PT: 12.1 sec;   INR: 1.01 ratio         PTT - ( 21 Dec 2021 11:54 )  PTT:27.6 sec            12-21 @ 12:09  4.3  45              Consultant(s) Notes Reviewed:      Care Discussed with Consultants/Other Providers:

## 2021-12-22 NOTE — PROGRESS NOTE ADULT - ASSESSMENT
86   year old female      h/o hemorrhagic CVA, has  PEG,  HTN, MI,      HLD, gout   right Ca breast. s/p mastectomy in 2019,  s/p  sentinel node  localization.  4/4,  were  negative         *  p/w SOB and  acute respiratory distress    was  on   bipap  in er   with  elevated wbc of 17,000 on arrival,  from pna/  probable aspiration/ gram negative pna  cxr,? pl effusion, right  lung opacity  CT c, ordered   *   s/p cva, has  PEG,  npo/,  on  synthroid, Keppra  ,  *  HTN, on meds  per  card  prior  echo,  normal ef  *  h/o ca breast. /, s/p  R  mastectomy  *  bacteremia. / staph epi, meth R. may be  a  contaminant  spoke  with ID d r oey  ct  c/a/p, ?  pna, perforated  appendicitis,  ?  mets  to  acetabulum   surg/ IR  and  oncology eval dr pacheco    appreciate  excellent  care of  house  staff  per  IR,  awaiting rpt ct a/p   today  per  surg, no  intervention   follow rpt blood   c/s.  on iv  zosyn   on iv fluids/    on iv zosyn   rd< from: Xray Chest 1 View- PORTABLE-Urgent (12.19.21 @ 09:20) >  IMPRESSION:  Low lung volumes. New small left lower lung hazy opacity may represent   atelectasis versus pleural effusion. Redemonstration of right upper lung   perihilar opacity largely unchanged prior exam.  --- End of Report --  < end of copied text >

## 2021-12-22 NOTE — CHART NOTE - NSCHARTNOTEFT_GEN_A_CORE
Repeat CT reviewed, RLQ abscess is larger. Plan for drain tomorrow with anesthesia. Make NPO midnight, hold all anticoagulation starting now. Place IR procedure order under my name.

## 2021-12-22 NOTE — PROGRESS NOTE ADULT - SUBJECTIVE AND OBJECTIVE BOX
CARDIOLOGY     PROGRESS  NOTE   ________________________________________________    CHIEF COMPLAINT:Patient is a 86y old  Female who presents with a chief complaint of fevers/sob (22 Dec 2021 07:20)  doing better.  	  REVIEW OF SYSTEMS:  CONSTITUTIONAL: No fever, weight loss, or fatigue  EYES: No eye pain, visual disturbances, or discharge  ENT:  No difficulty hearing, tinnitus, vertigo; No sinus or throat pain  NECK: No pain or stiffness  RESPIRATORY: No cough, wheezing, chills or hemoptysis; No Shortness of Breath  CARDIOVASCULAR: No chest pain, palpitations, passing out, dizziness, or leg swelling  GASTROINTESTINAL: No abdominal or epigastric pain. No nausea, vomiting, or hematemesis; No diarrhea or constipation. No melena or hematochezia.  GENITOURINARY: No dysuria, frequency, hematuria, or incontinence  NEUROLOGICAL: No headaches, memory loss, loss of strength, numbness, or tremors  SKIN: No itching, burning, rashes, or lesions   LYMPH Nodes: No enlarged glands  ENDOCRINE: No heat or cold intolerance; No hair loss  MUSCULOSKELETAL: No joint pain or swelling; No muscle, back, or extremity pain  PSYCHIATRIC: No depression, anxiety, mood swings, or difficulty sleeping  HEME/LYMPH: No easy bruising, or bleeding gums  ALLERGY AND IMMUNOLOGIC: No hives or eczema	    [ ] All others negative	  [ ] Unable to obtain    PHYSICAL EXAM:  T(C): 36.5 (12-22-21 @ 04:38), Max: 36.5 (12-22-21 @ 04:38)  HR: 102 (12-22-21 @ 05:27) (86 - 111)  BP: 118/70 (12-22-21 @ 04:38) (117/74 - 148/76)  RR: 18 (12-22-21 @ 04:38) (18 - 18)  SpO2: 96% (12-22-21 @ 05:27) (93% - 99%)  Wt(kg): --  I&O's Summary    21 Dec 2021 07:01  -  22 Dec 2021 07:00  --------------------------------------------------------  IN: 10 mL / OUT: 1150 mL / NET: -1140 mL        Appearance: Normal	  HEENT:   Normal oral mucosa, PERRL, EOMI	  Lymphatic: No lymphadenopathy  Cardiovascular: Normal S1 S2, No JVD, + murmurs, No edema  Respiratory: Lungs clear to auscultation	  Psychiatry: A & O x 3, Mood & affect appropriate  Gastrointestinal:  Soft, + tender, + BS	  Skin: No rashes, No ecchymoses, No cyanosis	  Neurologic: Non-focal  Extremities: Normal range of motion, No clubbing, cyanosis or edema  Vascular: Peripheral pulses palpable 2+ bilaterally    MEDICATIONS  (STANDING):  albuterol/ipratropium for Nebulization 3 milliLiter(s) Nebulizer every 6 hours  allopurinol 100 milliGRAM(s) Oral daily  amLODIPine   Tablet 10 milliGRAM(s) Oral daily  artificial  tears Solution 1 Drop(s) Both EYES two times a day  atorvastatin 20 milliGRAM(s) Oral at bedtime  dextrose 40% Gel 15 Gram(s) Oral once  dextrose 5%. 1000 milliLiter(s) (50 mL/Hr) IV Continuous <Continuous>  dextrose 5%. 1000 milliLiter(s) (100 mL/Hr) IV Continuous <Continuous>  dextrose 50% Injectable 25 Gram(s) IV Push once  dextrose 50% Injectable 12.5 Gram(s) IV Push once  dextrose 50% Injectable 25 Gram(s) IV Push once  glucagon  Injectable 1 milliGRAM(s) IntraMuscular once  heparin   Injectable 5000 Unit(s) SubCutaneous every 12 hours  insulin lispro (ADMELOG) corrective regimen sliding scale   SubCutaneous every 6 hours  lactated ringers. 1000 milliLiter(s) (60 mL/Hr) IV Continuous <Continuous>  levETIRAcetam  Solution 750 milliGRAM(s) Oral two times a day  levothyroxine 75 MICROGram(s) Oral daily  metoprolol tartrate 25 milliGRAM(s) Oral two times a day  piperacillin/tazobactam IVPB.. 3.375 Gram(s) IV Intermittent every 8 hours      TELEMETRY: 	    ECG:  	  RADIOLOGY:  OTHER: 	  	  LABS:	 	    CARDIAC MARKERS:                                10.0   13.06 )-----------( 273      ( 22 Dec 2021 07:05 )             31.7     12-22    143  |  104  |  22  ----------------------------<  153<H>  3.7   |  23  |  0.69    Ca    9.2      22 Dec 2021 07:05  Phos  2.7     12-22  Mg     2.3     12-22    TPro  7.1  /  Alb  3.6  /  TBili  0.3  /  DBili  x   /  AST  11  /  ALT  15  /  AlkPhos  90  12-22    proBNP: Serum Pro-Brain Natriuretic Peptide: 375 pg/mL (12-19 @ 08:45)    Lipid Profile:   HgA1c:   TSH:   PT/INR - ( 21 Dec 2021 11:54 )   PT: 12.1 sec;   INR: 1.01 ratio         PTT - ( 21 Dec 2021 11:54 )  PTT:27.6 sec  - No acute surgical intervention given perforated appendicitis with large associated abscess  - Recommend IR drainage of collection  - Continue antibiotics    < from: IR Procedure (07.05.17 @ 18:38) >  IMPRESSION: Placement of percutaneous gastrostomy tube using fluoroscopic   guidance as described above.    < from: CT Chest w/ IV Cont (12.19.21 @ 21:39) >  Findings concerning for perforated appendicitis with a right lower   quadrant collection associated with the appendiceal tip.    Complete atelectasis of the left lower lobe with debris occupying the   left lower lobe airways. An underlying pneumonia is not excluded.    Indeterminate lesion within the right acetabulum with associated osseous   erosion. Malignancy is not excluded.    Cholelithiasis and suspected choledocholithiasis. No biliary duct   dilation.    Thickening of the endometrial complex. Pelvic ultrasound may be performed   for further assessment.    Pancreatic tail lesion measuring 1 cm, possibly a side branch IPMN. This   can be further assessed with nonemergent contrast enhanced abdominal   MRI/MRCP.          Assessment and plan  ---------------------------  85 yo F from [facility], CVA with residual left weakness (~3 years ago), PEG for dysphagia, hypothyroid, COPD (on no home Oxygen), HTN, gout, p/w fever, Pt has respiratory distress, wheezing, concerning for respiratory infection, otherwise no other symptoms is reported on the chart. EMS gave solumedrol 125 through peripheral, small iv line.  pt is well known to me with hx of htn, ashd, s/p MI, cva with increasing sob on bipap in er.  can not get any hx from the pt.  sob ?respiratory failure ?sec to pneumoniae  ?pleural effusion, check ct chest noted  continue bp meds  dvt prophylaxis  abx  will consider to possible repeat echo  duoneb  dvt prophylaxis  ct scan/ surgery/ ID noted  continue amlodipine and Metoprolol for now  tachycardia sec to underlying condition, continue to observe  oob to chair as tolerated  IR, continue abx  incentive spirometry  repeat chest tomorrow

## 2021-12-23 NOTE — PROGRESS NOTE ADULT - ASSESSMENT
85 yo F from [facility], CVA with residual left weakness (~3 years ago), PEG for dysphagia, hypothyroid, COPD (on no home Oxygen), HTN, gout, admitted 12/19/21 with fever and SOB  She had right simple mastectomy in 4/2019.  Any Rx after that is unknown. 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   She would ideally have received adjuvant hormonal therapy.  She had RLQ drain for possible perforated appendix with collection on 12/23/21,  She is on abx  Eventually can get bone scan and further imaging of hip as needed.   If looks metastatic will use hormonal therapy thru PEG. Spoke to resident

## 2021-12-23 NOTE — PROGRESS NOTE ADULT - SUBJECTIVE AND OBJECTIVE BOX
afebrile    REVIEW OF SYSTEMS:  GEN: no fever,    no chills  RESP: no SOB,   no cough  CVS: no chest pain,   no palpitations  GI: no abdominal pain,   no nausea,   no vomiting,   no constipation,   no diarrhea  : no dysuria,   no frequency  NEURO: no headache,   no dizziness  PSYCH: no depression,   not anxious  Derm : no rash    MEDICATIONS  (STANDING):  albuterol/ipratropium for Nebulization 3 milliLiter(s) Nebulizer every 6 hours  allopurinol 100 milliGRAM(s) Oral daily  amLODIPine   Tablet 10 milliGRAM(s) Oral daily  artificial  tears Solution 1 Drop(s) Both EYES two times a day  atorvastatin 20 milliGRAM(s) Oral at bedtime  dextrose 40% Gel 15 Gram(s) Oral once  dextrose 5%. 1000 milliLiter(s) (50 mL/Hr) IV Continuous <Continuous>  dextrose 5%. 1000 milliLiter(s) (100 mL/Hr) IV Continuous <Continuous>  dextrose 50% Injectable 25 Gram(s) IV Push once  dextrose 50% Injectable 12.5 Gram(s) IV Push once  dextrose 50% Injectable 25 Gram(s) IV Push once  glucagon  Injectable 1 milliGRAM(s) IntraMuscular once  insulin lispro (ADMELOG) corrective regimen sliding scale   SubCutaneous every 6 hours  lactated ringers. 1000 milliLiter(s) (60 mL/Hr) IV Continuous <Continuous>  levETIRAcetam  Solution 750 milliGRAM(s) Oral two times a day  levothyroxine 75 MICROGram(s) Oral daily  metoprolol tartrate 25 milliGRAM(s) Oral two times a day  piperacillin/tazobactam IVPB.. 3.375 Gram(s) IV Intermittent every 8 hours  vancomycin  IVPB 1500 milliGRAM(s) IV Intermittent every 12 hours  vancomycin  IVPB        MEDICATIONS  (PRN):  acetaminophen     Tablet .. 650 milliGRAM(s) Oral every 6 hours PRN Temp greater or equal to 38C (100.4F), Mild Pain (1 - 3)  melatonin 3 milliGRAM(s) Oral at bedtime PRN Insomnia      Vital Signs Last 24 Hrs  T(C): 37.4 (23 Dec 2021 04:38), Max: 37.4 (23 Dec 2021 04:38)  T(F): 99.3 (23 Dec 2021 04:38), Max: 99.3 (23 Dec 2021 04:38)  HR: 97 (23 Dec 2021 05:30) (86 - 105)  BP: 133/64 (23 Dec 2021 04:38) (130/59 - 133/64)  BP(mean): --  RR: 18 (23 Dec 2021 04:38) (18 - 18)  SpO2: 96% (23 Dec 2021 05:30) (93% - 100%)  CAPILLARY BLOOD GLUCOSE      POCT Blood Glucose.: 265 mg/dL (23 Dec 2021 06:39)  POCT Blood Glucose.: 131 mg/dL (22 Dec 2021 23:54)  POCT Blood Glucose.: 150 mg/dL (22 Dec 2021 17:34)  POCT Blood Glucose.: 181 mg/dL (22 Dec 2021 11:42)    I&O's Summary    22 Dec 2021 07:01  -  23 Dec 2021 07:00  --------------------------------------------------------  IN: 0 mL / OUT: 960 mL / NET: -960 mL        PHYSICAL EXAM:  HEAD:  Atraumatic, Normocephalic  NECK: Supple, No   JVD  CHEST/LUNG:   no     rales,     no,    rhonchi  HEART: Regular rate and rhythm;         murmur  ABDOMEN: Soft, Nontender, ;   EXTREMITIES:    no    edema  NEUROLOGY:  alert    LABS:                        9.4    14.70 )-----------( 248      ( 23 Dec 2021 06:51 )             29.9     12-23    136  |  100  |  19  ----------------------------<  248<H>  3.4<L>   |  23  |  0.76    Ca    8.9      23 Dec 2021 06:51  Phos  3.6     12-23  Mg     2.0     12-23    TPro  6.5  /  Alb  3.3  /  TBili  0.4  /  DBili  x   /  AST  8<L>  /  ALT  12  /  AlkPhos  91  12-23    PT/INR - ( 23 Dec 2021 06:51 )   PT: 13.1 sec;   INR: 1.10 ratio         PTT - ( 23 Dec 2021 06:51 )  PTT:26.7 sec            12-21 @ 12:09  4.3  45              Consultant(s) Notes Reviewed:      Care Discussed with Consultants/Other Providers:

## 2021-12-23 NOTE — PROGRESS NOTE ADULT - SUBJECTIVE AND OBJECTIVE BOX
CARDIOLOGY     PROGRESS  NOTE   ________________________________________________    CHIEF COMPLAINT:Patient is a 86y old  Female who presents with a chief complaint of fevers/sob (23 Dec 2021 08:27)  doing better.  	  REVIEW OF SYSTEMS:  CONSTITUTIONAL: No fever, weight loss, or fatigue  EYES: No eye pain, visual disturbances, or discharge  ENT:  No difficulty hearing, tinnitus, vertigo; No sinus or throat pain  NECK: No pain or stiffness  RESPIRATORY: No cough, wheezing, chills or hemoptysis; No Shortness of Breath  CARDIOVASCULAR: No chest pain, palpitations, passing out, dizziness, or leg swelling  GASTROINTESTINAL: No abdominal or epigastric pain. No nausea, vomiting, or hematemesis; No diarrhea or constipation. No melena or hematochezia.  GENITOURINARY: No dysuria, frequency, hematuria, or incontinence  NEUROLOGICAL: No headaches, memory loss, loss of strength, numbness, or tremors  SKIN: No itching, burning, rashes, or lesions   LYMPH Nodes: No enlarged glands  ENDOCRINE: No heat or cold intolerance; No hair loss  MUSCULOSKELETAL: No joint pain or swelling; No muscle, back, or extremity pain  PSYCHIATRIC: No depression, anxiety, mood swings, or difficulty sleeping  HEME/LYMPH: No easy bruising, or bleeding gums  ALLERGY AND IMMUNOLOGIC: No hives or eczema	    [ ] All others negative	  [ ] Unable to obtain    PHYSICAL EXAM:  T(C): 37.4 (12-23-21 @ 04:38), Max: 37.4 (12-23-21 @ 04:38)  HR: 97 (12-23-21 @ 05:30) (86 - 105)  BP: 133/64 (12-23-21 @ 04:38) (130/59 - 133/64)  RR: 18 (12-23-21 @ 04:38) (18 - 18)  SpO2: 96% (12-23-21 @ 08:37) (93% - 100%)  Wt(kg): --  I&O's Summary    22 Dec 2021 07:01  -  23 Dec 2021 07:00  --------------------------------------------------------  IN: 0 mL / OUT: 960 mL / NET: -960 mL        Appearance: Normal	  HEENT:   Normal oral mucosa, PERRL, EOMI	  Lymphatic: No lymphadenopathy  Cardiovascular: Normal S1 S2, No JVD, +murmurs, No edema  Respiratory: Lungs clear to auscultation	  Psychiatry: A & O x 3, Mood & affect appropriate  Gastrointestinal:  Soft, decrease distention , + BS	  Skin: No rashes, No ecchymoses, No cyanosis	  Neurologic: Non-focal  Extremities: Normal range of motion, No clubbing, cyanosis or edema  Vascular: Peripheral pulses palpable 2+ bilaterally    MEDICATIONS  (STANDING):  albuterol/ipratropium for Nebulization 3 milliLiter(s) Nebulizer every 6 hours  allopurinol 100 milliGRAM(s) Oral daily  amLODIPine   Tablet 10 milliGRAM(s) Oral daily  artificial  tears Solution 1 Drop(s) Both EYES two times a day  atorvastatin 20 milliGRAM(s) Oral at bedtime  dextrose 40% Gel 15 Gram(s) Oral once  dextrose 5%. 1000 milliLiter(s) (50 mL/Hr) IV Continuous <Continuous>  dextrose 5%. 1000 milliLiter(s) (100 mL/Hr) IV Continuous <Continuous>  dextrose 50% Injectable 25 Gram(s) IV Push once  dextrose 50% Injectable 12.5 Gram(s) IV Push once  dextrose 50% Injectable 25 Gram(s) IV Push once  glucagon  Injectable 1 milliGRAM(s) IntraMuscular once  insulin lispro (ADMELOG) corrective regimen sliding scale   SubCutaneous every 6 hours  lactated ringers. 1000 milliLiter(s) (60 mL/Hr) IV Continuous <Continuous>  levETIRAcetam  Solution 750 milliGRAM(s) Oral two times a day  levothyroxine 75 MICROGram(s) Oral daily  metoprolol tartrate 25 milliGRAM(s) Oral two times a day  piperacillin/tazobactam IVPB.. 3.375 Gram(s) IV Intermittent every 8 hours  vancomycin  IVPB 1500 milliGRAM(s) IV Intermittent every 12 hours  vancomycin  IVPB          TELEMETRY: 	    ECG:  	  RADIOLOGY:  OTHER: 	  	  LABS:	 	    CARDIAC MARKERS:                                9.4    14.70 )-----------( 248      ( 23 Dec 2021 06:51 )             29.9     12-23    136  |  100  |  19  ----------------------------<  248<H>  3.4<L>   |  23  |  0.76    Ca    8.9      23 Dec 2021 06:51  Phos  3.6     12-23  Mg     2.0     12-23    TPro  6.5  /  Alb  3.3  /  TBili  0.4  /  DBili  x   /  AST  8<L>  /  ALT  12  /  AlkPhos  91  12-23    proBNP: Serum Pro-Brain Natriuretic Peptide: 375 pg/mL (12-19 @ 08:45)    Lipid Profile:   HgA1c:   TSH:   PT/INR - ( 23 Dec 2021 06:51 )   PT: 13.1 sec;   INR: 1.10 ratio         PTT - ( 23 Dec 2021 06:51 )  PTT:26.7 sec    1) Perforated appendicitis  - Abscess in setting suspected perforated appendix  - Zosyn 3.375g q 8  - F/U surgery, ?role OR for source control  - IR eval  2) Positive BCX  - Likely contam, no apparent focus (Staph epi)  - F/U pending BCXs  - Hold vanco for now (restart if clinical signs worsening)  3) Leukocytosis, Fever  - Trend to normal  - Monitor for alternate sources    - No acute surgical intervention given perforated appendicitis with large associated abscess  - Blood cultures 12/22: preliminary growth gram positive cocci in clusters.  - CT: RLQ abscess is larger.  - IR will drain abscess today.   - NPO since  midnight.   - Continue antibiotics        Assessment and plan  ---------------------------  85 yo F from [facility] rehab, CVA with residual left weakness (~3 years ago), PEG for dysphagia, hypothyroid, COPD (on no home Oxygen), HTN, gout, p/w fever, Pt has respiratory distress, wheezing, concerning for respiratory infection, otherwise no other symptoms is reported on the chart. EMS gave solumedrol 125 through peripheral, small iv line.  pt is well known to me with hx of htn, ashd, s/p MI, cva with increasing sob on bipap in er.  can not get any hx from the pt.  sob ?respiratory failure ?sec to pneumoniae  ?pleural effusion, check ct chest noted  continue bp meds  dvt prophylaxis  abx  will consider to possible repeat echo  duoneb  dvt prophylaxis  ct scan/ surgery/ ID noted  continue amlodipine and Metoprolol for now  tachycardia sec to underlying condition, continue to observe  oob to chair as tolerated  IR, continue abx/ surgery noted  incentive spirometry  replete K

## 2021-12-23 NOTE — PROGRESS NOTE ADULT - SUBJECTIVE AND OBJECTIVE BOX
Patient is a 86y old  Female who presents with a chief complaint of fevers/sob (23 Dec 2021 09:08)      SUBJECTIVE / OVERNIGHT EVENTS:    MEDICATIONS  (STANDING):  albuterol/ipratropium for Nebulization 3 milliLiter(s) Nebulizer every 6 hours  allopurinol 100 milliGRAM(s) Oral daily  amLODIPine   Tablet 10 milliGRAM(s) Oral daily  artificial  tears Solution 1 Drop(s) Both EYES two times a day  atorvastatin 20 milliGRAM(s) Oral at bedtime  cefepime   IVPB 1000 milliGRAM(s) IV Intermittent every 8 hours  dextrose 40% Gel 15 Gram(s) Oral once  dextrose 5%. 1000 milliLiter(s) (50 mL/Hr) IV Continuous <Continuous>  dextrose 5%. 1000 milliLiter(s) (100 mL/Hr) IV Continuous <Continuous>  dextrose 50% Injectable 25 Gram(s) IV Push once  dextrose 50% Injectable 12.5 Gram(s) IV Push once  dextrose 50% Injectable 25 Gram(s) IV Push once  glucagon  Injectable 1 milliGRAM(s) IntraMuscular once  insulin lispro (ADMELOG) corrective regimen sliding scale   SubCutaneous every 6 hours  lactated ringers. 1000 milliLiter(s) (60 mL/Hr) IV Continuous <Continuous>  levETIRAcetam  Solution 750 milliGRAM(s) Oral two times a day  levothyroxine 75 MICROGram(s) Oral daily  metoprolol tartrate 25 milliGRAM(s) Oral two times a day  metroNIDAZOLE  IVPB 500 milliGRAM(s) IV Intermittent every 12 hours  potassium chloride    Tablet ER 40 milliEquivalent(s) Oral once    MEDICATIONS  (PRN):  acetaminophen     Tablet .. 650 milliGRAM(s) Oral every 6 hours PRN Temp greater or equal to 38C (100.4F), Mild Pain (1 - 3)  melatonin 3 milliGRAM(s) Oral at bedtime PRN Insomnia      Vital Signs Last 24 Hrs  T(C): 37 (23 Dec 2021 11:54), Max: 37.4 (23 Dec 2021 04:38)  T(F): 98.6 (23 Dec 2021 11:14), Max: 99.3 (23 Dec 2021 04:38)  HR: 90 (23 Dec 2021 11:54) (86 - 105)  BP: 136/70 (23 Dec 2021 11:54) (130/59 - 136/70)  BP(mean): --  RR: 18 (23 Dec 2021 11:54) (18 - 18)  SpO2: 100% (23 Dec 2021 11:54) (93% - 100%)  CAPILLARY BLOOD GLUCOSE      POCT Blood Glucose.: 265 mg/dL (23 Dec 2021 06:39)  POCT Blood Glucose.: 131 mg/dL (22 Dec 2021 23:54)  POCT Blood Glucose.: 150 mg/dL (22 Dec 2021 17:34)    I&O's Summary    22 Dec 2021 07:01  -  23 Dec 2021 07:00  --------------------------------------------------------  IN: 0 mL / OUT: 960 mL / NET: -960 mL        PHYSICAL EXAM:  GENERAL: NAD, well-developed, stable on NC  HEAD:  Atraumatic, Normocephalic  EYES: EOMI, PERRLA, conjunctiva and sclera clear  NECK: Supple, No JVD  CHEST/LUNG: Clear to auscultation bilaterally; No wheeze  HEART: Regular rate and rhythm; No murmurs, rubs, or gallops  ABDOMEN: Abdomen: soft, softly distended, mild periumbilical tenderness, no rebound or guarding, LUQ PEG in place with mild erythema and signs of excoriation over insertion site  EXTREMITIES:  2+ Peripheral Pulses, No clubbing, cyanosis, or edema  PSYCH: AAOx3  NEUROLOGY: non-focal  SKIN: No rashes or lesions    LABS:                        9.4    14.70 )-----------( 248      ( 23 Dec 2021 06:51 )             29.9     12-23    136  |  100  |  19  ----------------------------<  248<H>  3.4<L>   |  23  |  0.76    Ca    8.9      23 Dec 2021 06:51  Phos  3.6     12-23  Mg     2.0     12-23    TPro  6.5  /  Alb  3.3  /  TBili  0.4  /  DBili  x   /  AST  8<L>  /  ALT  12  /  AlkPhos  91  12-23    PT/INR - ( 23 Dec 2021 06:51 )   PT: 13.1 sec;   INR: 1.10 ratio         PTT - ( 23 Dec 2021 06:51 )  PTT:26.7 sec    RADIOLOGY & ADDITIONAL TESTS:    Imaging Personally Reviewed:    Consultant(s) Notes Reviewed:      Care Discussed with Consultants/Other Providers:

## 2021-12-23 NOTE — PROGRESS NOTE ADULT - ASSESSMENT
84 yo F with CVA, PEG, COPD, presenting with fevers, SOB  Leukocytosis, fever  CT concerning for perforated appendicitis, atelectasis, pancreatic lesion  BCX with CoNS--no apparent cardiac device (note L shoulder hardware in place)--3/4  Suspect CoNS is colonizer  Generally stable to improved today    1) Perforated appendicitis  Abscess in setting suspected perforated appendix  s/p IR drainage 12/23  --To avoid Vanc/Zosyn combo will switch to Cefepime 1g IV Q8H and Metronidazole 500 mg IV Q12H  --Follow up on IR drainage cultures    2) Positive BCX, Therapeutic Drug Monitoring  BCx (12/19) with 3/4 Staph epi  BCx (12/21) with 2/4 Staph epi  TTE (12/21) with possible vegetation on the left or non-coronary cusp of the aortic valve  --Recommend adjusting Vancomycin to 1.5g IV Q24H. Check trough prior to third dose. Target trough of 15-20.   --Continue to monitor renal function and vancomycin levels to avoid nephrotoxicity / otoxicity secondary to vancomycin  --Recommend 2x repeat BCx tomorrow AM.   --Ideally (clinical status permitting) MIKALA should be pursued    3) Leukocytosis, Fever  - Trend to normal  - Monitor for alternate sources    I will be away over this upcoming weekend. Please contact the Infectious Diseases Office with any further questions or concerns.     Justice Corona M.D.  [facility] Division of Infectious Disease  8AM-5PM: Pager Number [phone]  After Hours (or if no response): Please contact the Infectious Diseases Office at [phone]     The above assessment and plan were discussed with medicine resident

## 2021-12-23 NOTE — PROGRESS NOTE ADULT - SUBJECTIVE AND OBJECTIVE BOX
[facility] Progress Note    INTERVAL EVENTS:   No acute events overnight.    SUBJECTIVE: Patient seen and examined at bedside with surgical team, patient mild abdominal pain. Denies fever, chills, CP, SOB nausea, vomiting.     OBJECTIVE:    Vital Signs Last 24 Hrs  T(C): 36.7 (22 Dec 2021 20:40), Max: 36.7 (22 Dec 2021 20:40)  T(F): 98 (22 Dec 2021 20:40), Max: 98 (22 Dec 2021 20:40)  HR: 86 (22 Dec 2021 23:13) (86 - 105)  BP: 130/59 (22 Dec 2021 20:40) (118/70 - 132/68)  BP(mean): --  RR: 18 (22 Dec 2021 20:40) (18 - 18)  SpO2: 96% (22 Dec 2021 23:13) (93% - 98%)    I&O's Detail    21 Dec 2021 07:01  -  22 Dec 2021 07:00  --------------------------------------------------------  IN:    Jevity 1.2: 10 mL  Total IN: 10 mL    OUT:    Voided (mL): 1150 mL  Total OUT: 1150 mL    Total NET: -1140 mL      22 Dec 2021 07:01  -  23 Dec 2021 02:09  --------------------------------------------------------  IN:  Total IN: 0 mL    OUT:    Voided (mL): 760 mL  Total OUT: 760 mL    Total NET: -760 mL      --------------------------------------------------------    PHYSICAL EXAM:  General: AAOx3, no acute distress.  Respiratory: on 6L NC with breathing treatment ongoing  Abdomen: soft, softly distended, mild periumbilical tenderness, no rebound or guarding, LUQ PEG in place with mild erythema and signs of excoriation over insertion site  Extremities: LLE contracted, LUE weakness        MEDICATIONS  (STANDING):  albuterol/ipratropium for Nebulization 3 milliLiter(s) Nebulizer every 6 hours  allopurinol 100 milliGRAM(s) Oral daily  amLODIPine   Tablet 10 milliGRAM(s) Oral daily  atorvastatin 20 milliGRAM(s) Oral at bedtime  azithromycin  IVPB 500 milliGRAM(s) IV Intermittent every 24 hours  dextrose 40% Gel 15 Gram(s) Oral once  dextrose 5%. 1000 milliLiter(s) (50 mL/Hr) IV Continuous <Continuous>  dextrose 5%. 1000 milliLiter(s) (100 mL/Hr) IV Continuous <Continuous>  dextrose 50% Injectable 25 Gram(s) IV Push once  dextrose 50% Injectable 12.5 Gram(s) IV Push once  dextrose 50% Injectable 25 Gram(s) IV Push once  glucagon  Injectable 1 milliGRAM(s) IntraMuscular once  heparin   Injectable 5000 Unit(s) SubCutaneous every 12 hours  insulin lispro (ADMELOG) corrective regimen sliding scale   SubCutaneous every 6 hours  levETIRAcetam  IVPB 750 milliGRAM(s) IV Intermittent every 12 hours  levothyroxine 75 MICROGram(s) Oral daily  metoprolol tartrate 25 milliGRAM(s) Oral two times a day  piperacillin/tazobactam IVPB.. 3.375 Gram(s) IV Intermittent every 8 hours  predniSONE   Tablet 40 milliGRAM(s) Oral daily  sodium chloride 0.9%. 1000 milliLiter(s) (75 mL/Hr) IV Continuous <Continuous>    MEDICATIONS  (PRN):  acetaminophen     Tablet .. 650 milliGRAM(s) Oral every 6 hours PRN Temp greater or equal to 38C (100.4F), Mild Pain (1 - 3)      LABS:                        10.0   13.06 )-----------( 273      ( 22 Dec 2021 07:05 )             31.7     12-22    143  |  104  |  22  ----------------------------<  153<H>  3.7   |  23  |  0.69    Ca    9.2      22 Dec 2021 07:05  Phos  2.7     12-22  Mg     2.3     12-22    TPro  7.1  /  Alb  3.6  /  TBili  0.3  /  DBili  x   /  AST  11  /  ALT  15  /  AlkPhos  90  12-22    PT/INR - ( 21 Dec 2021 11:54 )   PT: 12.1 sec;   INR: 1.01 ratio         PTT - ( 21 Dec 2021 11:54 )  PTT:27.6 sec          Culture - Blood (collected 21 Dec 2021 17:15)  Source: .Blood Blood-Peripheral  Gram Stain (22 Dec 2021 22:03):    Growth in anaerobic bottle: Gram Positive Cocci in Clusters  Preliminary Report (22 Dec 2021 22:03):    Growth in anaerobic bottle: Gram Positive Cocci in Clusters    Culture - Blood (collected 21 Dec 2021 17:15)  Source: .Blood Blood-Peripheral  Gram Stain (22 Dec 2021 16:26):    Growth in aerobic bottle: Gram Positive Cocci in Clusters  Preliminary Report (22 Dec 2021 16:27):    Growth in aerobic bottle: Gram Positive Cocci in Clusters              Culture - Urine (collected 19 Dec 2021 17:57)  Source: Clean Catch Clean Catch (Midstream)  Final Report (20 Dec 2021 13:36):    <10,000 CFU/mL Normal Urogenital Regina    Culture - Blood (collected 19 Dec 2021 14:24)  Source: .Blood Blood-Venous  Gram Stain (20 Dec 2021 10:46):    Growth in aerobic bottle: Gram Positive Cocci in Clusters  Preliminary Report (21 Dec 2021 11:19):    Growth in aerobic bottle: Staphylococcus epidermidis Susceptibility to    follow.    Culture - Blood (collected 19 Dec 2021 14:24)  Source: .Blood Blood  Gram Stain (20 Dec 2021 12:19):    Growth in aerobic bottle: Gram Positive Cocci in Clusters    Growth in anaerobic bottle: Gram Positive Cocci in Clusters  Final Report (21 Dec 2021 10:16):    Growth in aerobic and anaerobic bottles: Staphylococcus epidermidis Coag    Negative Staphylococcus    Single set isolate, possible contaminant. Contact    Microbiology if susceptibility testing clinically    indicated.    ***Blood Panel PCR results on this specimen are available    approximately 3 hours after the Gram stain result.***    Gram stain, PCR, and/or culture results may not always    correspond due to difference in methodologies.    ************************************************************    This PCR assay was performed by multiplex PCR. This    Assay tests for 66 bacterial and resistance gene targets.    Please refer to the [facility] test directory    at [email or web address] for details.  Organism: Blood Culture PCR (21 Dec 2021 10:16)  Organism: Blood Culture PCR (21 Dec 2021 10:16)    melatonin 3 milliGRAM(s) Oral at bedtime PRN Insomnia

## 2021-12-23 NOTE — PROGRESS NOTE ADULT - SUBJECTIVE AND OBJECTIVE BOX
HPI:  . 85 yo F     from [facility], CVA with residual left weakness (~3 years ago), PEG for dysphagia, hypothyroid, COPD (on no home Oxygen), HTN, gout,   has  a peg    p/w fever,/sob    Pt has respiratory distress, wheezing, concerning for respiratory infection, otherwise no other symptoms is reported on the chart. EMS gave solumedrol 125  (19 Dec 2021 15:39)    PAST MEDICAL & SURGICAL HISTORY:  MI (myocardial infarction)    HTN (hypertension)    Personal history of asbestosis    Gout    Dyslipidemia    UTI (lower urinary tract infection)    ETOH abuse    CVA (cerebral vascular accident)    History of left shoulder fracture    History of benign breast tumor      Medications:  acetaminophen     Tablet .. 650 milliGRAM(s) Oral every 6 hours PRN Temp greater or equal to 38C (100.4F), Mild Pain (1 - 3)  albuterol/ipratropium for Nebulization 3 milliLiter(s) Nebulizer every 6 hours  allopurinol 100 milliGRAM(s) Oral daily  amLODIPine   Tablet 10 milliGRAM(s) Oral daily  artificial  tears Solution 1 Drop(s) Both EYES two times a day  atorvastatin 20 milliGRAM(s) Oral at bedtime  cefepime   IVPB 1000 milliGRAM(s) IV Intermittent every 8 hours  dextrose 40% Gel 15 Gram(s) Oral once  dextrose 5%. 1000 milliLiter(s) IV Continuous <Continuous>  dextrose 5%. 1000 milliLiter(s) IV Continuous <Continuous>  dextrose 50% Injectable 25 Gram(s) IV Push once  dextrose 50% Injectable 12.5 Gram(s) IV Push once  dextrose 50% Injectable 25 Gram(s) IV Push once  glucagon  Injectable 1 milliGRAM(s) IntraMuscular once  HYDROmorphone  Injectable 0.25 milliGRAM(s) IV Push every 10 minutes PRN Moderate Pain (4 - 6)  insulin lispro (ADMELOG) corrective regimen sliding scale   SubCutaneous every 6 hours  lactated ringers. 1000 milliLiter(s) IV Continuous <Continuous>  levETIRAcetam  Solution 750 milliGRAM(s) Oral two times a day  levothyroxine 75 MICROGram(s) Oral daily  melatonin 3 milliGRAM(s) Oral at bedtime PRN Insomnia  metoprolol tartrate 25 milliGRAM(s) Oral two times a day  metroNIDAZOLE  IVPB 500 milliGRAM(s) IV Intermittent every 12 hours  ondansetron Injectable 4 milliGRAM(s) IV Push once PRN Nausea and/or Vomiting    Labs:  CBC Full  -  ( 23 Dec 2021 06:51 )  WBC Count : 14.70 K/uL  Hemoglobin : 9.4 g/dL  Hematocrit : 29.9 %  Platelet Count - Automated : 248 K/uL  Mean Cell Volume : 102.0 fl  Mean Cell Hemoglobin : 32.1 pg  Mean Cell Hemoglobin Concentration : 31.4 gm/dL  Auto Neutrophil # : 11.93 K/uL  Auto Lymphocyte # : 1.24 K/uL  Auto Monocyte # : 1.19 K/uL  Auto Eosinophil # : 0.16 K/uL  Auto Basophil # : 0.02 K/uL  Auto Neutrophil % : 81.2 %  Auto Lymphocyte % : 8.4 %  Auto Monocyte % : 8.1 %  Auto Eosinophil % : 1.1 %  Auto Basophil % : 0.1 %    12-23    136  |  100  |  19  ----------------------------<  248<H>  3.4<L>   |  23  |  0.76    Ca    8.9      23 Dec 2021 06:51  Phos  3.6     12-23  Mg     2.0     12-23    TPro  6.5  /  Alb  3.3  /  TBili  0.4  /  DBili  x   /  AST  8<L>  /  ALT  12  /  AlkPhos  91  12-23      Radiology:             ROS:  Patient comfortable without distress  No SOB or chest pain  No palpitation  No abdominal pain, diarrhaea or constipation  No weakness of extremities  No skin changes or swelling of legs    Vital Signs Last 24 Hrs  T(C): 36.8 (23 Dec 2021 14:03), Max: 37.4 (23 Dec 2021 04:38)  T(F): 98.2 (23 Dec 2021 14:03), Max: 99.3 (23 Dec 2021 04:38)  HR: 79 (23 Dec 2021 14:03) (72 - 102)  BP: 94/56 (23 Dec 2021 14:03) (94/56 - 136/70)  BP(mean): --  RR: 18 (23 Dec 2021 14:03) (18 - 20)  SpO2: 99% (23 Dec 2021 14:03) (96% - 100%)    Physical exam:  Patient alert and oriented  No distress  CVS: S1, S2 regular or murmur  Chest: bilateral breath sound without rales  Abdomen: soft, not tender, no organomegaly or masses  No focal neuro deficit  No edema      Assessment and Plan:Height (cm): 162.6 (12-23 @ 11:54)  Weight (kg): 109.5 (12-23 @ 11:54)  BMI (kg/m2): 41.4 (12-23 @ 11:54)  BSA (m2): 2.12 (12-23 @ 11:54) 87 yo F from [facility], CVA with residual left weakness (~3 years ago), PEG for dysphagia, hypothyroid, COPD (on no home Oxygen), HTN, gout, admitted 12/19/21 with fever and SOB  She had right simple mastectomy in 4/2019.  Any Rx after that is unknown. 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 650 milliGRAM(s) Oral every 6 hours PRN Temp greater or equal to 38C (100.4F), Mild Pain (1 - 3)  albuterol/ipratropium for Nebulization 3 milliLiter(s) Nebulizer every 6 hours  allopurinol 100 milliGRAM(s) Oral daily  amLODIPine   Tablet 10 milliGRAM(s) Oral daily  artificial  tears Solution 1 Drop(s) Both EYES two times a day  atorvastatin 20 milliGRAM(s) Oral at bedtime  cefepime   IVPB 1000 milliGRAM(s) IV Intermittent every 8 hours  dextrose 40% Gel 15 Gram(s) Oral once  dextrose 5%. 1000 milliLiter(s) IV Continuous <Continuous>  dextrose 5%. 1000 milliLiter(s) IV Continuous <Continuous>  dextrose 50% Injectable 25 Gram(s) IV Push once  dextrose 50% Injectable 12.5 Gram(s) IV Push once  dextrose 50% Injectable 25 Gram(s) IV Push once  glucagon  Injectable 1 milliGRAM(s) IntraMuscular once  HYDROmorphone  Injectable 0.25 milliGRAM(s) IV Push every 10 minutes PRN Moderate Pain (4 - 6)  insulin lispro (ADMELOG) corrective regimen sliding scale   SubCutaneous every 6 hours  lactated ringers. 1000 milliLiter(s) IV Continuous <Continuous>  levETIRAcetam  Solution 750 milliGRAM(s) Oral two times a day  levothyroxine 75 MICROGram(s) Oral daily  melatonin 3 milliGRAM(s) Oral at bedtime PRN Insomnia  metoprolol tartrate 25 milliGRAM(s) Oral two times a day  metroNIDAZOLE  IVPB 500 milliGRAM(s) IV Intermittent every 12 hours  ondansetron Injectable 4 milliGRAM(s) IV Push once PRN Nausea and/or Vomiting    Labs:  CBC Full  -  ( 23 Dec 2021 06:51 )  WBC Count : 14.70 K/uL  Hemoglobin : 9.4 g/dL  Hematocrit : 29.9 %  Platelet Count - Automated : 248 K/uL  Mean Cell Volume : 102.0 fl  Mean Cell Hemoglobin : 32.1 pg  Mean Cell Hemoglobin Concentration : 31.4 gm/dL  Auto Neutrophil # : 11.93 K/uL  Auto Lymphocyte # : 1.24 K/uL  Auto Monocyte # : 1.19 K/uL  Auto Eosinophil # : 0.16 K/uL  Auto Basophil # : 0.02 K/uL  Auto Neutrophil % : 81.2 %  Auto Lymphocyte % : 8.4 %  Auto Monocyte % : 8.1 %  Auto Eosinophil % : 1.1 %  Auto Basophil % : 0.1 %    12-23    136  |  100  |  19  ----------------------------<  248<H>  3.4<L>   |  23  |  0.76    Ca    8.9      23 Dec 2021 06:51  Phos  3.6     12-23  Mg     2.0     12-23    TPro  6.5  /  Alb  3.3  /  TBili  0.4  /  DBili  x   /  AST  8<L>  /  ALT  12  /  AlkPhos  91  12-23      Radiology:             ROS:  Patient comfortable without distress  No SOB or chest pain  No palpitation  No abdominal pain, diarrhaea or constipation  No weakness of extremities  No skin changes or swelling of legs    Vital Signs Last 24 Hrs  T(C): 36.8 (23 Dec 2021 14:03), Max: 37.4 (23 Dec 2021 04:38)  T(F): 98.2 (23 Dec 2021 14:03), Max: 99.3 (23 Dec 2021 04:38)  HR: 79 (23 Dec 2021 14:03) (72 - 102)  BP: 94/56 (23 Dec 2021 14:03) (94/56 - 136/70)  BP(mean): --  RR: 18 (23 Dec 2021 14:03) (18 - 20)  SpO2: 99% (23 Dec 2021 14:03) (96% - 100%)    Physical exam:  Patient alert and oriented  No distress, obese  CVS: S1, S2 regular or murmur  Chest: bilateral breath sound without rales  Abdomen: soft, drain +  No focal neuro deficit  No edema      Assessment and Plan:Height (cm): 162.6 (12-23 @ 11:54)  Weight (kg): 109.5 (12-23 @ 11:54)  BMI (kg/m2): 41.4 (12-23 @ 11:54)  BSA (m2): 2.12 (12-23 @ 11:54)

## 2021-12-23 NOTE — PROGRESS NOTE ADULT - PROBLEM SELECTOR PLAN 3
H/o COPD, no home O2, now presenting w/ hypoxia, concern for COPD exacerbation  - prednisone stopped 2/2 hyperglycemia  - c/w zosyn, vanc and flagyl  - airway clearance and chest PT

## 2021-12-23 NOTE — PROGRESS NOTE ADULT - PROBLEM SELECTOR PLAN 2
Concern for fever w/ hypoxia, sob w/ CT chest and CXR findings concerning for PNA, asp vs CAP, likely cause of hypoxia  - Will treat w/ zosyn for now  - chest PT, airway clearance  - BiPAP if worsening, stable VBG off BiPAP  - urine legionella neg  - Bcx growing gram pos cocci, coag neg,  likely contaminant as per ID, consistent on repeat  - c/w vancomycin, zosyn and flagyl  - TTE w/o signs of endocarditis   - f/u repeat cultures  - wean off NC as tolerated

## 2021-12-23 NOTE — PROGRESS NOTE ADULT - ASSESSMENT
86   year old female      h/o hemorrhagic CVA, has  PEG,  HTN, MI,      HLD, gout   right Ca breast. s/p mastectomy in 2019,  s/p  sentinel node  localization.  4/4,  were  negative         *  p/w SOB and  acute respiratory distress    was  on   bipap  in er   with  elevated wbc of 17,000 on arrival,  from pna/  probable aspiration/ gram negative pna  cxr,? pl effusion, right  lung opacity  CT c, ordered   *   s/p cva, has  PEG,  npo/,  on  synthroid, Keppra  ,  *  HTN, on meds  per  card  prior  echo,  normal ef  *  h/o ca breast. /, s/p  R  mastectomy  *  bacteremia. / staph epi, meth R. may be  a  contaminant  spoke  with ID d r oey  ct  c/a/p, ?  pna, perforated  appendicitis,  ?  mets  to  acetabulum   surg/ IR  and  oncology eval dr pacheco    appreciate  excellent  care of  house  staff  per  surg, no  intervention   follow rpt blood   c/s.  on iv  zosyn   on iv fluids/    rpt ct  noted,  RLL  phlegmon,  awiat IR drainage           rd< from: Xray Chest 1 View- PORTABLE-Urgent (12.19.21 @ 09:20) >  IMPRESSION:  Low lung volumes. New small left lower lung hazy opacity may represent   atelectasis versus pleural effusion. Redemonstration of right upper lung   perihilar opacity largely unchanged prior exam.  --- End of Report --  < end of copied text >             86   year old female      h/o hemorrhagic CVA, has  PEG,  HTN, MI,      HLD, gout   right Ca breast. s/p mastectomy in 2019,  s/p  sentinel node  localization.  4/4,  were  negative         *  p/w SOB and  acute respiratory distress    was  on   bipap  in er   with  elevated wbc of 17,000 on arrival,  from pna/  probable aspiration/ gram negative pna  cxr,? pl effusion, right  lung opacity  CT c, ordered   *   s/p cva, has  PEG,  npo/,  on  synthroid, Keppra  ,  *  HTN, on meds  per  card  prior  echo,  normal ef  *  h/o ca breast. /, s/p  R  mastectomy  *  bacteremia. / staph epi, meth R. may be  a  contaminant  spoke  with ID d r oey  ct  c/a/p, ?  pna, perforated  appendicitis,  ?  mets  to  acetabulum   surg/ IR  and  oncology eval dr pacheco    appreciate  excellent  care of  house  staff  per  surg, no  intervention   follow rpt blood   c/s.  on iv  zosyn  GPC  ,  clusters  from 12/21, on iv vanco   on iv fluids/    rpt ct  noted,  RLL  phlegmon,  awiat IR drainage           rd< from: Xray Chest 1 View- PORTABLE-Urgent (12.19.21 @ 09:20) >  IMPRESSION:  Low lung volumes. New small left lower lung hazy opacity may represent   atelectasis versus pleural effusion. Redemonstration of right upper lung   perihilar opacity largely unchanged prior exam.  --- End of Report --  < end of copied text >

## 2021-12-23 NOTE — PROGRESS NOTE ADULT - SUBJECTIVE AND OBJECTIVE BOX
Follow Up:      Interval History:    REVIEW OF SYSTEMS  [  ] ROS unobtainable because:    [  ] All other systems negative except as noted below    Constitutional:  [ ] fever [ ] chills  [ ] weight loss  [ ] weakness  Skin:  [ ] rash [ ] phlebitis	  Eyes: [ ] icterus [ ] pain  [ ] discharge	  ENMT: [ ] sore throat  [ ] thrush [ ] ulcers [ ] exudates  Respiratory: [ ] dyspnea [ ] hemoptysis [ ] cough [ ] sputum	  Cardiovascular:  [ ] chest pain [ ] palpitations [ ] edema	  Gastrointestinal:  [ ] nausea [ ] vomiting [ ] diarrhea [ ] constipation [ ] pain	  Genitourinary:  [ ] dysuria [ ] frequency [ ] hematuria [ ] discharge [ ] flank pain  [ ] incontinence  Musculoskeletal:  [ ] myalgias [ ] arthralgias [ ] arthritis  [ ] back pain  Neurological:  [ ] headache [ ] seizures  [ ] confusion/altered mental status    Allergies  Toradol (Urticaria (Mild to Mod); Rash (Mild to Mod))        ANTIMICROBIALS:  cefepime   IVPB 1000 every 8 hours  metroNIDAZOLE  IVPB 500 every 12 hours      OTHER MEDS:  MEDICATIONS  (STANDING):  acetaminophen     Tablet .. 650 every 6 hours PRN  albuterol/ipratropium for Nebulization 3 every 6 hours  allopurinol 100 daily  amLODIPine   Tablet 10 daily  atorvastatin 20 at bedtime  dextrose 40% Gel 15 once  dextrose 50% Injectable 25 once  dextrose 50% Injectable 12.5 once  dextrose 50% Injectable 25 once  glucagon  Injectable 1 once  HYDROmorphone  Injectable 0.25 every 10 minutes PRN  insulin lispro (ADMELOG) corrective regimen sliding scale  every 6 hours  levETIRAcetam  Solution 750 two times a day  levothyroxine 75 daily  melatonin 3 at bedtime PRN  metoprolol tartrate 25 two times a day  ondansetron Injectable 4 once PRN      Vital Signs Last 24 Hrs  T(C): 36.8 (23 Dec 2021 14:03), Max: 37.4 (23 Dec 2021 04:38)  T(F): 98.2 (23 Dec 2021 14:03), Max: 99.3 (23 Dec 2021 04:38)  HR: 79 (23 Dec 2021 14:03) (72 - 97)  BP: 94/56 (23 Dec 2021 14:03) (94/56 - 136/70)  BP(mean): --  RR: 18 (23 Dec 2021 14:03) (18 - 20)  SpO2: 99% (23 Dec 2021 14:03) (96% - 100%)    PHYSICAL EXAMINATION:  General: Alert and Awake, NAD  HEENT: PERRL, EOMI  Neck: Supple  Cardiac: RRR, No M/R/G  Resp: CTAB, No Wh/Rh/Ra  Abdomen: NBS, NT/ND, No HSM, No rigidity or guarding  MSK: No LE edema. No Calf tenderness  : No flanagan  Skin: No rashes or lesions. Skin is warm and dry to the touch.   Neuro: Alert and Awake. CN 2-12 Grossly intact. Moves all four extremities spontaneously.  Psych: Calm, Pleasant, Cooperative                          9.4    14.70 )-----------( 248      ( 23 Dec 2021 06:51 )             29.9       12-23    136  |  100  |  19  ----------------------------<  248<H>  3.4<L>   |  23  |  0.76    Ca    8.9      23 Dec 2021 06:51  Phos  3.6     12-23  Mg     2.0     12-23    TPro  6.5  /  Alb  3.3  /  TBili  0.4  /  DBili  x   /  AST  8<L>  /  ALT  12  /  AlkPhos  91  12-23          MICROBIOLOGY:  v  .Blood Blood-Peripheral  12-21-21   Growth in aerobic bottle: Gram Positive Cocci in Clusters  --    Growth in aerobic bottle: Gram Positive Cocci in Clusters      Clean Catch Clean Catch (Midstream)  12-19-21   <10,000 CFU/mL Normal Urogenital Regina  --  --      .Blood Blood  12-19-21   Growth in aerobic and anaerobic bottles: Staphylococcus epidermidis Coag  Negative Staphylococcus  Single set isolate, possible contaminant. Contact  Microbiology if susceptibility testing clinically  indicated.  ***Blood Panel PCR results on this specimen are available  approximately 3 hours after the Gram stain result.***  Gram stain, PCR, and/or culture results may not always  correspond due to difference in methodologies.  ************************************************************  This PCR assay was performed by multiplex PCR. This  Assay tests for 66 bacterial and resistance gene targets.  Please refer to the [facility] test directory  at [email or web address] for details.  --  Blood Culture PCR          Rapid RVP Result: Meseret (12-19 @ 08:44)        RADIOLOGY:    <The imaging below has been reviewed and visualized by me independently. Findings as detailed in report below> Follow Up:  bacteremia    Interval History: blood cultures from 12/21 resulted as positive. went for IR drainage today. notes RLQ abdominal pain.     REVIEW OF SYSTEMS  [  ] ROS unobtainable because:    [ x ] All other systems negative except as noted below    Constitutional:  [ ] fever [ ] chills  [ ] weight loss  [ ] weakness  Skin:  [ ] rash [ ] phlebitis	  Eyes: [ ] icterus [ ] pain  [ ] discharge	  ENMT: [ ] sore throat  [ ] thrush [ ] ulcers [ ] exudates  Respiratory: [ ] dyspnea [ ] hemoptysis [ ] cough [ ] sputum	  Cardiovascular:  [ ] chest pain [ ] palpitations [ ] edema	  Gastrointestinal:  [ ] nausea [ ] vomiting [ ] diarrhea [ ] constipation [x ] pain	  Genitourinary:  [ ] dysuria [ ] frequency [ ] hematuria [ ] discharge [ ] flank pain  [ ] incontinence  Musculoskeletal:  [ ] myalgias [ ] arthralgias [ ] arthritis  [ ] back pain  Neurological:  [ ] headache [ ] seizures  [ ] confusion/altered mental status    Allergies  Toradol (Urticaria (Mild to Mod); Rash (Mild to Mod))        ANTIMICROBIALS:  cefepime   IVPB 1000 every 8 hours  metroNIDAZOLE  IVPB 500 every 12 hours      OTHER MEDS:  MEDICATIONS  (STANDING):  acetaminophen     Tablet .. 650 every 6 hours PRN  albuterol/ipratropium for Nebulization 3 every 6 hours  allopurinol 100 daily  amLODIPine   Tablet 10 daily  atorvastatin 20 at bedtime  dextrose 40% Gel 15 once  dextrose 50% Injectable 25 once  dextrose 50% Injectable 12.5 once  dextrose 50% Injectable 25 once  glucagon  Injectable 1 once  HYDROmorphone  Injectable 0.25 every 10 minutes PRN  insulin lispro (ADMELOG) corrective regimen sliding scale  every 6 hours  levETIRAcetam  Solution 750 two times a day  levothyroxine 75 daily  melatonin 3 at bedtime PRN  metoprolol tartrate 25 two times a day  ondansetron Injectable 4 once PRN      Vital Signs Last 24 Hrs  T(C): 36.8 (23 Dec 2021 14:03), Max: 37.4 (23 Dec 2021 04:38)  T(F): 98.2 (23 Dec 2021 14:03), Max: 99.3 (23 Dec 2021 04:38)  HR: 79 (23 Dec 2021 14:03) (72 - 97)  BP: 94/56 (23 Dec 2021 14:03) (94/56 - 136/70)  BP(mean): --  RR: 18 (23 Dec 2021 14:03) (18 - 20)  SpO2: 99% (23 Dec 2021 14:03) (96% - 100%)    PHYSICAL EXAMINATION:  General: Alert and Awake, NAD  Cardiac: RRR, No M/R/G  Resp: CTAB, No Wh/Rh/Ra  Abdomen: +drain in RLQ with bloody appearing drainage, NBS, ND, RLQ tenderness to palpation, No HSM, No rigidity or guarding  MSK: No LE edema. LLE contracted  Skin: No rashes or lesions. Skin is warm and dry to the touch.   Neuro: Alert and Awake. CN 2-12 Grossly intact. Moves all four extremities spontaneously.  Psych: Calm, Pleasant, Cooperative                          9.4    14.70 )-----------( 248      ( 23 Dec 2021 06:51 )             29.9       12-23    136  |  100  |  19  ----------------------------<  248<H>  3.4<L>   |  23  |  0.76    Ca    8.9      23 Dec 2021 06:51  Phos  3.6     12-23  Mg     2.0     12-23    TPro  6.5  /  Alb  3.3  /  TBili  0.4  /  DBili  x   /  AST  8<L>  /  ALT  12  /  AlkPhos  91  12-23          MICROBIOLOGY:  v  .Blood Blood-Peripheral  12-21-21   Growth in aerobic bottle: Gram Positive Cocci in Clusters  --    Growth in aerobic bottle: Gram Positive Cocci in Clusters      Clean Catch Clean Catch (Midstream)  12-19-21   <10,000 CFU/mL Normal Urogenital Regina  --  --      .Blood Blood  12-19-21   Growth in aerobic and anaerobic bottles: Staphylococcus epidermidis Coag  Negative Staphylococcus  Single set isolate, possible contaminant. Contact  Microbiology if susceptibility testing clinically  indicated.  ***Blood Panel PCR results on this specimen are available  approximately 3 hours after the Gram stain result.***  Gram stain, PCR, and/or culture results may not always  correspond due to difference in methodologies.  ************************************************************  This PCR assay was performed by multiplex PCR. This  Assay tests for 66 bacterial and resistance gene targets.  Please refer to the [facility] test directory  at [email or web address] for details.  --  Blood Culture PCR    Rapid RVP Result: NotDetec (12-19 @ 08:44)    RADIOLOGY:    <The imaging below has been reviewed and visualized by me independently. Findings as detailed in report below>    < from: CT Abdomen and Pelvis w/ IV Cont (12.22.21 @ 15:31) >  IMPRESSION:    Right lower quadrant phlegmonous change associated with perforated tip   appendicitis. A loculated fluid collection is mildly increased.    Total left lower lobe atelectasis, unchanged.    < end of copied text >

## 2021-12-23 NOTE — PROGRESS NOTE ADULT - ASSESSMENT
85 yo F h/o CVA with residual L-sided weakness, dysphagia s/p PEG (2017), breast ca, MI, COPD presenting from rehab with fever and SOB with Staph epi bacteremia, currently being treated for pneumonia. Surgery consulted for CT showing for perforated appendicitis with 7.3cm intraabdominal collection. Patient hemodynamically stable, however with persistent oxygen requirements.    Plan:    - No acute surgical intervention given perforated appendicitis with large associated abscess  - Blood cultures 12/22: preliminary growth gram positive cocci in clusters.  - CT: RLQ abscess is larger.  - IR will drain abscess today.   - NPO since  midnight.   - Continue antibiotics        [place] [facility]   [id number]

## 2021-12-24 NOTE — PROGRESS NOTE ADULT - PROBLEM SELECTOR PLAN 4
H/o COPD, no home O2, now presenting w/ hypoxia, concern for COPD exacerbation  - prednisone stopped 2/2 hyperglycemia  - c/w cefepime, vanc and flagyl  - airway clearance and chest PT

## 2021-12-24 NOTE — PROGRESS NOTE ADULT - SUBJECTIVE AND OBJECTIVE BOX
[facility] Progress Note    INTERVAL EVENTS:   No acute events overnight.    SUBJECTIVE: Patient seen and examined at bedside with surgical team, patient mild abdominal pain. Denies fever, chills, CP, SOB nausea, vomiting.     OBJECTIVE:    Vital Signs Last 24 Hrs  T(C): 36.7 (24 Dec 2021 01:41), Max: 37.4 (23 Dec 2021 04:38)  T(F): 98 (24 Dec 2021 01:41), Max: 99.3 (23 Dec 2021 04:38)  HR: 80 (24 Dec 2021 01:41) (72 - 97)  BP: 120/66 (24 Dec 2021 01:41) (94/56 - 136/70)  BP(mean): --  RR: 18 (24 Dec 2021 01:41) (18 - 20)  SpO2: 99% (24 Dec 2021 01:41) (96% - 100%)    I&O's Detail    22 Dec 2021 07:01  -  23 Dec 2021 07:00  --------------------------------------------------------  IN:  Total IN: 0 mL    OUT:    Voided (mL): 960 mL  Total OUT: 960 mL    Total NET: -960 mL      23 Dec 2021 07:01  -  24 Dec 2021 03:23  --------------------------------------------------------  IN:  Total IN: 0 mL    OUT:    Bulb (mL): 40 mL    Voided (mL): 400 mL  Total OUT: 440 mL    Total NET: -440 mL      --------------------------------------------------------    PHYSICAL EXAM:  General: AAOx3, no acute distress.  Respiratory: on 6L NC with breathing treatment ongoing  Abdomen: soft, softly distended, mild periumbilical tenderness, no rebound or guarding, LUQ PEG in place with mild erythema and signs of excoriation over insertion site. Drain placed in RLQ  Extremities: LLE contracted, LUE weakness        MEDICATIONS  (STANDING):  albuterol/ipratropium for Nebulization 3 milliLiter(s) Nebulizer every 6 hours  allopurinol 100 milliGRAM(s) Oral daily  amLODIPine   Tablet 10 milliGRAM(s) Oral daily  atorvastatin 20 milliGRAM(s) Oral at bedtime  azithromycin  IVPB 500 milliGRAM(s) IV Intermittent every 24 hours  dextrose 40% Gel 15 Gram(s) Oral once  dextrose 5%. 1000 milliLiter(s) (50 mL/Hr) IV Continuous <Continuous>  dextrose 5%. 1000 milliLiter(s) (100 mL/Hr) IV Continuous <Continuous>  dextrose 50% Injectable 25 Gram(s) IV Push once  dextrose 50% Injectable 12.5 Gram(s) IV Push once  dextrose 50% Injectable 25 Gram(s) IV Push once  glucagon  Injectable 1 milliGRAM(s) IntraMuscular once  heparin   Injectable 5000 Unit(s) SubCutaneous every 12 hours  insulin lispro (ADMELOG) corrective regimen sliding scale   SubCutaneous every 6 hours  levETIRAcetam  IVPB 750 milliGRAM(s) IV Intermittent every 12 hours  levothyroxine 75 MICROGram(s) Oral daily  metoprolol tartrate 25 milliGRAM(s) Oral two times a day  piperacillin/tazobactam IVPB.. 3.375 Gram(s) IV Intermittent every 8 hours  predniSONE   Tablet 40 milliGRAM(s) Oral daily  sodium chloride 0.9%. 1000 milliLiter(s) (75 mL/Hr) IV Continuous <Continuous>    MEDICATIONS  (PRN):  acetaminophen     Tablet .. 650 milliGRAM(s) Oral every 6 hours PRN Temp greater or equal to 38C (100.4F), Mild Pain (1 - 3)      LABS:                        9.4    14.70 )-----------( 248      ( 23 Dec 2021 06:51 )             29.9     12-23    136  |  100  |  19  ----------------------------<  248<H>  3.4<L>   |  23  |  0.76    Ca    8.9      23 Dec 2021 06:51  Phos  3.6     12-23  Mg     2.0     12-23    TPro  6.5  /  Alb  3.3  /  TBili  0.4  /  DBili  x   /  AST  8<L>  /  ALT  12  /  AlkPhos  91  12-23    PT/INR - ( 23 Dec 2021 06:51 )   PT: 13.1 sec;   INR: 1.10 ratio         PTT - ( 23 Dec 2021 06:51 )  PTT:26.7 sec          Culture - Blood (collected 21 Dec 2021 17:15)  Source: .Blood Blood-Peripheral  Gram Stain (22 Dec 2021 22:03):    Growth in anaerobic bottle: Gram Positive Cocci in Clusters  Final Report (23 Dec 2021 17:48):    Growth in anaerobic bottle: Staphylococcus epidermidis "Susceptibilities    not performed"    Culture - Blood (collected 21 Dec 2021 17:15)  Source: .Blood Blood-Peripheral  Gram Stain (22 Dec 2021 16:26):    Growth in aerobic bottle: Gram Positive Cocci in Clusters  Final Report (23 Dec 2021 17:57):    Growth in aerobic bottle: Staphylococcus epidermidis "Susceptibilities    not performed"            Culture - Blood (collected 21 Dec 2021 17:15)  Source: .Blood Blood-Peripheral  Gram Stain (22 Dec 2021 22:03):    Growth in anaerobic bottle: Gram Positive Cocci in Clusters  Preliminary Report (22 Dec 2021 22:03):    Growth in anaerobic bottle: Gram Positive Cocci in Clusters    Culture - Blood (collected 21 Dec 2021 17:15)  Source: .Blood Blood-Peripheral  Gram Stain (22 Dec 2021 16:26):    Growth in aerobic bottle: Gram Positive Cocci in Clusters  Preliminary Report (22 Dec 2021 16:27):    Growth in aerobic bottle: Gram Positive Cocci in Clusters              Culture - Urine (collected 19 Dec 2021 17:57)  Source: Clean Catch Clean Catch (Midstream)  Final Report (20 Dec 2021 13:36):    <10,000 CFU/mL Normal Urogenital Regina    Culture - Blood (collected 19 Dec 2021 14:24)  Source: .Blood Blood-Venous  Gram Stain (20 Dec 2021 10:46):    Growth in aerobic bottle: Gram Positive Cocci in Clusters  Preliminary Report (21 Dec 2021 11:19):    Growth in aerobic bottle: Staphylococcus epidermidis Susceptibility to    follow.    Culture - Blood (collected 19 Dec 2021 14:24)  Source: .Blood Blood  Gram Stain (20 Dec 2021 12:19):    Growth in aerobic bottle: Gram Positive Cocci in Clusters    Growth in anaerobic bottle: Gram Positive Cocci in Clusters  Final Report (21 Dec 2021 10:16):    Growth in aerobic and anaerobic bottles: Staphylococcus epidermidis Coag    Negative Staphylococcus    Single set isolate, possible contaminant. Contact    Microbiology if susceptibility testing clinically    indicated.    ***Blood Panel PCR results on this specimen are available    approximately 3 hours after the Gram stain result.***    Gram stain, PCR, and/or culture results may not always    correspond due to difference in methodologies.    ************************************************************    This PCR assay was performed by multiplex PCR. This    Assay tests for 66 bacterial and resistance gene targets.    Please refer to the [facility] Labs test directory    at [email or web address] for details.  Organism: Blood Culture PCR (21 Dec 2021 10:16)  Organism: Blood Culture PCR (21 Dec 2021 10:16)    melatonin 3 milliGRAM(s) Oral at bedtime PRN Insomnia   [facility] Progress Note    INTERVAL EVENTS:   No acute events overnight.    SUBJECTIVE: Patient seen and examined at bedside with surgical team, patient mild abdominal pain. Denies fever, chills, CP, SOB nausea, vomiting.     OBJECTIVE:    Vital Signs Last 24 Hrs  T(C): 36.7 (24 Dec 2021 01:41), Max: 37.4 (23 Dec 2021 04:38)  T(F): 98 (24 Dec 2021 01:41), Max: 99.3 (23 Dec 2021 04:38)  HR: 80 (24 Dec 2021 01:41) (72 - 97)  BP: 120/66 (24 Dec 2021 01:41) (94/56 - 136/70)  BP(mean): --  RR: 18 (24 Dec 2021 01:41) (18 - 20)  SpO2: 99% (24 Dec 2021 01:41) (96% - 100%)    I&O's Detail    22 Dec 2021 07:01  -  23 Dec 2021 07:00  --------------------------------------------------------  IN:  Total IN: 0 mL    OUT:    Voided (mL): 960 mL  Total OUT: 960 mL    Total NET: -960 mL      23 Dec 2021 07:01  -  24 Dec 2021 03:23  --------------------------------------------------------  IN:  Total IN: 0 mL    OUT:    Bulb (mL): 40 mL    Voided (mL): 400 mL  Total OUT: 440 mL    Total NET: -440 mL      --------------------------------------------------------    PHYSICAL EXAM:  General: AAOx3, no acute distress.  Respiratory: on 6L NC with breathing treatment ongoing  Abdomen: soft, softly distended, mild periumbilical tenderness, no rebound or guarding, LUQ PEG in place with mild erythema and signs of excoriation over insertion site. Drain placed in RLQ  Extremities: LLE contracted, LUE weakness        LABS:                        9.4    14.70 )-----------( 248      ( 23 Dec 2021 06:51 )             29.9     136  |  100  |  19  ----------------------------<  248<H>  3.4<L>   |  23  |  0.76    Ca    8.9      23 Dec 2021 06:51  Phos  3.6     12-23  Mg     2.0     12-23    TPro  6.5  /  Alb  3.3  /  TBili  0.4  /  DBili  x   /  AST  8<L>  /  ALT  12  /  AlkPhos  91  12-23    PT/INR - ( 23 Dec 2021 06:51 )   PT: 13.1 sec;   INR: 1.10 ratio    PTT - ( 23 Dec 2021 06:51 )  PTT:26.7 sec          Culture - Blood (collected 21 Dec 2021 17:15)  Source: .Blood Blood-Peripheral  Gram Stain (22 Dec 2021 22:03):    Growth in anaerobic bottle: Gram Positive Cocci in Clusters  Final Report (23 Dec 2021 17:48):    Growth in anaerobic bottle: Staphylococcus epidermidis "Susceptibilities    not performed"    Culture - Blood (collected 21 Dec 2021 17:15)  Source: .Blood Blood-Peripheral  Gram Stain (22 Dec 2021 16:26):    Growth in aerobic bottle: Gram Positive Cocci in Clusters  Final Report (23 Dec 2021 17:57):    Growth in aerobic bottle: Staphylococcus epidermidis "Susceptibilities    not performed"        Culture - Blood (collected 21 Dec 2021 17:15)  Source: .Blood Blood-Peripheral  Gram Stain (22 Dec 2021 22:03):    Growth in anaerobic bottle: Gram Positive Cocci in Clusters  Preliminary Report (22 Dec 2021 22:03):    Growth in anaerobic bottle: Gram Positive Cocci in Clusters    Culture - Blood (collected 21 Dec 2021 17:15)  Source: .Blood Blood-Peripheral  Gram Stain (22 Dec 2021 16:26):    Growth in aerobic bottle: Gram Positive Cocci in Clusters  Preliminary Report (22 Dec 2021 16:27):    Growth in aerobic bottle: Gram Positive Cocci in Clusters              Culture - Urine (collected 19 Dec 2021 17:57)  Source: Clean Catch Clean Catch (Midstream)  Final Report (20 Dec 2021 13:36):    <10,000 CFU/mL Normal Urogenital Regina    Culture - Blood (collected 19 Dec 2021 14:24)  Source: .Blood Blood-Venous  Gram Stain (20 Dec 2021 10:46):    Growth in aerobic bottle: Gram Positive Cocci in Clusters  Preliminary Report (21 Dec 2021 11:19):    Growth in aerobic bottle: Staphylococcus epidermidis Susceptibility to    follow.    Culture - Blood (collected 19 Dec 2021 14:24)  Source: .Blood Blood  Gram Stain (20 Dec 2021 12:19):    Growth in aerobic bottle: Gram Positive Cocci in Clusters    Growth in anaerobic bottle: Gram Positive Cocci in Clusters  Final Report (21 Dec 2021 10:16):    Growth in aerobic and anaerobic bottles: Staphylococcus epidermidis Coag    Negative Staphylococcus    Single set isolate, possible contaminant. Contact    Microbiology if susceptibility testing clinically    indicated.    ***Blood Panel PCR results on this specimen are available    approximately 3 hours after the Gram stain result.***    Gram stain, PCR, and/or culture results may not always    correspond due to difference in methodologies.    ************************************************************    This PCR assay was performed by multiplex PCR. This    Assay tests for 66 bacterial and resistance gene targets.    Please refer to the [facility] Labs test directory    at [email or web address] for details.  Organism: Blood Culture PCR (21 Dec 2021 10:16)  Organism: Blood Culture PCR (21 Dec 2021 10:16)

## 2021-12-24 NOTE — PROGRESS NOTE ADULT - SUBJECTIVE AND OBJECTIVE BOX
INFECTIOUS DISEASES FOLLOW UP-- Alicia Grover  [phone]    This is a follow up note for this  86yFemale with  Fever due to unspecified condition  perforated appendicitis  staph epi bacteremia        ROS:  CONSTITUTIONAL:  No fever, good appetite  CARDIOVASCULAR:  No chest pain or palpitations  RESPIRATORY:  No dyspnea  GASTROINTESTINAL:  No nausea, vomiting, diarrhea, or abdominal pain  GENITOURINARY:  No dysuria  NEUROLOGIC:  No headache,     Allergies    Toradol (Urticaria (Mild to Mod); Rash (Mild to Mod))    Intolerances        ANTIBIOTICS/RELEVANT:  antimicrobials  cefepime   IVPB 1000 milliGRAM(s) IV Intermittent every 8 hours  metroNIDAZOLE  IVPB 500 milliGRAM(s) IV Intermittent every 12 hours  vancomycin  IVPB 1500 milliGRAM(s) IV Intermittent every 24 hours    immunologic:    OTHER:  acetaminophen     Tablet .. 650 milliGRAM(s) Oral every 6 hours PRN  albuterol/ipratropium for Nebulization 3 milliLiter(s) Nebulizer every 6 hours  allopurinol 100 milliGRAM(s) Oral daily  amLODIPine   Tablet 10 milliGRAM(s) Oral daily  artificial  tears Solution 1 Drop(s) Both EYES two times a day  atorvastatin 20 milliGRAM(s) Oral at bedtime  dextrose 40% Gel 15 Gram(s) Oral once  dextrose 5%. 1000 milliLiter(s) IV Continuous <Continuous>  dextrose 5%. 1000 milliLiter(s) IV Continuous <Continuous>  dextrose 50% Injectable 25 Gram(s) IV Push once  dextrose 50% Injectable 12.5 Gram(s) IV Push once  dextrose 50% Injectable 25 Gram(s) IV Push once  glucagon  Injectable 1 milliGRAM(s) IntraMuscular once  heparin   Injectable 5000 Unit(s) SubCutaneous every 8 hours  HYDROmorphone  Injectable 0.25 milliGRAM(s) IV Push every 10 minutes PRN  insulin lispro (ADMELOG) corrective regimen sliding scale   SubCutaneous every 6 hours  lactated ringers. 1000 milliLiter(s) IV Continuous <Continuous>  levETIRAcetam  Solution 750 milliGRAM(s) Oral two times a day  levothyroxine 75 MICROGram(s) Oral daily  melatonin 3 milliGRAM(s) Oral at bedtime PRN  metoprolol tartrate 25 milliGRAM(s) Oral two times a day      Objective:  Vital Signs Last 24 Hrs  T(C): 36.6 (24 Dec 2021 05:37), Max: 36.9 (23 Dec 2021 20:28)  T(F): 97.9 (24 Dec 2021 05:37), Max: 98.5 (23 Dec 2021 20:28)  HR: 100 (24 Dec 2021 05:37) (79 - 100)  BP: 136/60 (24 Dec 2021 05:37) (94/56 - 136/60)  BP(mean): --  RR: 18 (24 Dec 2021 05:37) (18 - 18)  SpO2: 100% (24 Dec 2021 08:33) (99% - 100%)    PHYSICAL EXAM:  Constitutional:no acute distress  Eyes:JANE, EOMI  Ear/Nose/Throat: no oral lesions, 	  Respiratory: clear BL  Cardiovascular: S1S2  Gastrointestinal:soft, (+) BS, no tenderness  Extremities:no e/e/c  No Lymphadenopathy  IV sites not inflammed.    LABS:                        8.9    12.63 )-----------( 265      ( 24 Dec 2021 11:12 )             27.6     12-24    137  |  104  |  16  ----------------------------<  207<H>  4.2   |  20<L>  |  0.65    Ca    8.3<L>      24 Dec 2021 11:12  Phos  3.0     12-24  Mg     1.9     12-24    TPro  5.9<L>  /  Alb  2.7<L>  /  TBili  0.3  /  DBili  x   /  AST  10  /  ALT  13  /  AlkPhos  79  12-24    PT/INR - ( 23 Dec 2021 06:51 )   PT: 13.1 sec;   INR: 1.10 ratio         PTT - ( 23 Dec 2021 06:51 )  PTT:26.7 sec      MICROBIOLOGY:            RECENT CULTURES:  12-21 @ 17:15  .Blood Blood-Peripheral  --  --  --    Growth in aerobic bottle: Staphylococcus epidermidis "Susceptibilities  not performed"  --  12-19 @ 17:57  Clean Catch Clean Catch (Midstream)  --  --  --    <10,000 CFU/mL Normal Urogenital Regina  --  12-19 @ 14:24  .Blood Blood  Blood Culture PCR  Blood Culture PCR  PCR    Growth in aerobic and anaerobic bottles: Staphylococcus epidermidis Coag  Negative Staphylococcus  Single set isolate, possible contaminant. Contact  Microbiology if susceptibility testing clinically  indicated.  ***Blood Panel PCR results on this specimen are available  approximately 3 hours after the Gram stain result.***  Gram stain, PCR, and/or culture results may not always  correspond due to difference in methodologies.  ************************************************************  This PCR assay was performed by multiplex PCR. This  Assay tests for 66 bacterial and resistance gene targets.  Please refer to the [facility] test directory  at [email or web address] for details.  --      RADIOLOGY & ADDITIONAL STUDIES:    < from: IR Procedure (12.23.21 @ 12:24) >    Impression: Successful 10.2French drain placement into a right lower   quadrant abscess.    Plan: Suction drain. Flush daily to prevent clogging. Repeat CAT scan and   tube check in 2 weeks.    < end of copied text >  < from: CT Abdomen and Pelvis w/ IV Cont (12.22.21 @ 15:31) >    Right lower quadrant phlegmonous change associated with perforated tip   appendicitis. A loculated fluid collection is mildly increased.    Total left lower lobe atelectasis, unchanged.    < end of copied text >   INFECTIOUS DISEASES FOLLOW UP-- Alicia Grover  [phone]    This is a follow up note for this  86yFemale with  Fever due to unspecified condition  perforated appendicitis  staph epi bacteremia        ROS:  CONSTITUTIONAL:  Notes abdominal pain    Allergies    Toradol (Urticaria (Mild to Mod); Rash (Mild to Mod))    Intolerances        ANTIBIOTICS/RELEVANT:  antimicrobials  cefepime   IVPB 1000 milliGRAM(s) IV Intermittent every 8 hours  metroNIDAZOLE  IVPB 500 milliGRAM(s) IV Intermittent every 12 hours  vancomycin  IVPB 1500 milliGRAM(s) IV Intermittent every 24 hours    immunologic:    OTHER:  acetaminophen     Tablet .. 650 milliGRAM(s) Oral every 6 hours PRN  albuterol/ipratropium for Nebulization 3 milliLiter(s) Nebulizer every 6 hours  allopurinol 100 milliGRAM(s) Oral daily  amLODIPine   Tablet 10 milliGRAM(s) Oral daily  artificial  tears Solution 1 Drop(s) Both EYES two times a day  atorvastatin 20 milliGRAM(s) Oral at bedtime  dextrose 40% Gel 15 Gram(s) Oral once  dextrose 5%. 1000 milliLiter(s) IV Continuous <Continuous>  dextrose 5%. 1000 milliLiter(s) IV Continuous <Continuous>  dextrose 50% Injectable 25 Gram(s) IV Push once  dextrose 50% Injectable 12.5 Gram(s) IV Push once  dextrose 50% Injectable 25 Gram(s) IV Push once  glucagon  Injectable 1 milliGRAM(s) IntraMuscular once  heparin   Injectable 5000 Unit(s) SubCutaneous every 8 hours  HYDROmorphone  Injectable 0.25 milliGRAM(s) IV Push every 10 minutes PRN  insulin lispro (ADMELOG) corrective regimen sliding scale   SubCutaneous every 6 hours  lactated ringers. 1000 milliLiter(s) IV Continuous <Continuous>  levETIRAcetam  Solution 750 milliGRAM(s) Oral two times a day  levothyroxine 75 MICROGram(s) Oral daily  melatonin 3 milliGRAM(s) Oral at bedtime PRN  metoprolol tartrate 25 milliGRAM(s) Oral two times a day      Objective:  Vital Signs Last 24 Hrs  T(C): 36.6 (24 Dec 2021 05:37), Max: 36.9 (23 Dec 2021 20:28)  T(F): 97.9 (24 Dec 2021 05:37), Max: 98.5 (23 Dec 2021 20:28)  HR: 100 (24 Dec 2021 05:37) (79 - 100)  BP: 136/60 (24 Dec 2021 05:37) (94/56 - 136/60)  BP(mean): --  RR: 18 (24 Dec 2021 05:37) (18 - 18)  SpO2: 100% (24 Dec 2021 08:33) (99% - 100%)    PHYSICAL EXAM:  Constitutional:no acute distress- interactive  Eyes:JANE, EOMI  Ear/Nose/Throat: no oral lesions, 	  Respiratory: clear BL  Cardiovascular: S1S2  Gastrointestinal: chubby, drain in lower quadrant and RLQ abdominal tenderness on exam  PEG tube in place? source of S/epi bacteremia  Extremities:no e/e/c  No Lymphadenopathy  IV sites not inflammed.    LABS:                        8.9    12.63 )-----------( 265      ( 24 Dec 2021 11:12 )             27.6     12-24    137  |  104  |  16  ----------------------------<  207<H>  4.2   |  20<L>  |  0.65    Ca    8.3<L>      24 Dec 2021 11:12  Phos  3.0     12-24  Mg     1.9     12-24    TPro  5.9<L>  /  Alb  2.7<L>  /  TBili  0.3  /  DBili  x   /  AST  10  /  ALT  13  /  AlkPhos  79  12-24    PT/INR - ( 23 Dec 2021 06:51 )   PT: 13.1 sec;   INR: 1.10 ratio         PTT - ( 23 Dec 2021 06:51 )  PTT:26.7 sec      MICROBIOLOGY:            RECENT CULTURES:  12-21 @ 17:15  .Blood Blood-Peripheral  --  --  --    Growth in aerobic bottle: Staphylococcus epidermidis "Susceptibilities  not performed"  --  12-19 @ 17:57  Clean Catch Clean Catch (Midstream)  --  --  --    <10,000 CFU/mL Normal Urogenital Regina  --  12-19 @ 14:24  .Blood Blood  Blood Culture PCR  Blood Culture PCR  PCR    Growth in aerobic and anaerobic bottles: Staphylococcus epidermidis Coag  Negative Staphylococcus  Single set isolate, possible contaminant. Contact  Microbiology if susceptibility testing clinically  indicated.  ***Blood Panel PCR results on this specimen are available  approximately 3 hours after the Gram stain result.***  Gram stain, PCR, and/or culture results may not always  correspond due to difference in methodologies.  ************************************************************  This PCR assay was performed by multiplex PCR. This  Assay tests for 66 bacterial and resistance gene targets.  Please refer to the [facility] Labs test directory  at [email or web address] for details.  --      RADIOLOGY & ADDITIONAL STUDIES:    < from: IR Procedure (12.23.21 @ 12:24) >    Impression: Successful 10.2French drain placement into a right lower   quadrant abscess.    Plan: Suction drain. Flush daily to prevent clogging. Repeat CAT scan and   tube check in 2 weeks.    < end of copied text >  < from: CT Abdomen and Pelvis w/ IV Cont (12.22.21 @ 15:31) >    Right lower quadrant phlegmonous change associated with perforated tip   appendicitis. A loculated fluid collection is mildly increased.    Total left lower lobe atelectasis, unchanged.    < end of copied text >

## 2021-12-24 NOTE — PROGRESS NOTE ADULT - PROBLEM SELECTOR PLAN 3
TTE w/ signs of possible vegetation on non-coronary cusp of aortic valve, no murmurs appreciated on exam  - Treat w/ vancomycin 1500mg daily, troughs on 3rd dose  - f/u ID recs  - f/u repeat cx  - Will need MIKALA

## 2021-12-24 NOTE — PROGRESS NOTE ADULT - SUBJECTIVE AND OBJECTIVE BOX
afberile    REVIEW OF SYSTEMS:  GEN: no fever,    no chills  RESP: no SOB,   no cough  CVS: no chest pain,   no palpitations  GI: no abdominal pain,   no nausea,   no vomiting,   no constipation,   no diarrhea  : no dysuria,   no frequency  NEURO: no headache,   no dizziness  PSYCH: no depression,   not anxious  Derm : no rash    MEDICATIONS  (STANDING):  albuterol/ipratropium for Nebulization 3 milliLiter(s) Nebulizer every 6 hours  allopurinol 100 milliGRAM(s) Oral daily  amLODIPine   Tablet 10 milliGRAM(s) Oral daily  artificial  tears Solution 1 Drop(s) Both EYES two times a day  atorvastatin 20 milliGRAM(s) Oral at bedtime  cefepime   IVPB 1000 milliGRAM(s) IV Intermittent every 8 hours  dextrose 40% Gel 15 Gram(s) Oral once  dextrose 5%. 1000 milliLiter(s) (50 mL/Hr) IV Continuous <Continuous>  dextrose 5%. 1000 milliLiter(s) (100 mL/Hr) IV Continuous <Continuous>  dextrose 50% Injectable 25 Gram(s) IV Push once  dextrose 50% Injectable 12.5 Gram(s) IV Push once  dextrose 50% Injectable 25 Gram(s) IV Push once  glucagon  Injectable 1 milliGRAM(s) IntraMuscular once  heparin   Injectable 5000 Unit(s) SubCutaneous every 8 hours  insulin lispro (ADMELOG) corrective regimen sliding scale   SubCutaneous every 6 hours  lactated ringers. 1000 milliLiter(s) (60 mL/Hr) IV Continuous <Continuous>  levETIRAcetam  Solution 750 milliGRAM(s) Oral two times a day  levothyroxine 75 MICROGram(s) Oral daily  metoprolol tartrate 25 milliGRAM(s) Oral two times a day  metroNIDAZOLE  IVPB 500 milliGRAM(s) IV Intermittent every 12 hours  vancomycin  IVPB 1500 milliGRAM(s) IV Intermittent every 24 hours    MEDICATIONS  (PRN):  acetaminophen     Tablet .. 650 milliGRAM(s) Oral every 6 hours PRN Temp greater or equal to 38C (100.4F), Mild Pain (1 - 3)  HYDROmorphone  Injectable 0.25 milliGRAM(s) IV Push every 10 minutes PRN Moderate Pain (4 - 6)  melatonin 3 milliGRAM(s) Oral at bedtime PRN Insomnia      Vital Signs Last 24 Hrs  T(C): 36.6 (24 Dec 2021 05:37), Max: 36.9 (23 Dec 2021 20:28)  T(F): 97.9 (24 Dec 2021 05:37), Max: 98.5 (23 Dec 2021 20:28)  HR: 100 (24 Dec 2021 05:37) (72 - 100)  BP: 136/60 (24 Dec 2021 05:37) (94/56 - 136/60)  BP(mean): --  RR: 18 (24 Dec 2021 05:37) (18 - 20)  SpO2: 100% (24 Dec 2021 08:33) (99% - 100%)  CAPILLARY BLOOD GLUCOSE      POCT Blood Glucose.: 183 mg/dL (24 Dec 2021 12:07)  POCT Blood Glucose.: 188 mg/dL (24 Dec 2021 06:08)  POCT Blood Glucose.: 185 mg/dL (24 Dec 2021 00:57)  POCT Blood Glucose.: 118 mg/dL (23 Dec 2021 18:23)  POCT Blood Glucose.: 157 mg/dL (23 Dec 2021 13:47)    I&O's Summary    23 Dec 2021 07:01  -  24 Dec 2021 07:00  --------------------------------------------------------  IN: 280 mL / OUT: 940 mL / NET: -660 mL    24 Dec 2021 07:01  -  24 Dec 2021 12:20  --------------------------------------------------------  IN: 0 mL / OUT: 30 mL / NET: -30 mL        PHYSICAL EXAM:  HEAD:  Atraumatic, Normocephalic  NECK: Supple, No   JVD  CHEST/LUNG:   no     rales,     no,    rhonchi  HEART: Regular rate and rhythm;         murmur  ABDOMEN: Soft, Nontender, ;   EXTREMITIES:    no    edema  NEUROLOGY:  alert    LABS:                        8.9    12.63 )-----------( 265      ( 24 Dec 2021 11:12 )             27.6     12-24    137  |  104  |  16  ----------------------------<  207<H>  4.2   |  20<L>  |  0.65    Ca    8.3<L>      24 Dec 2021 11:12  Phos  3.0     12-24  Mg     1.9     12-24    TPro  5.9<L>  /  Alb  2.7<L>  /  TBili  0.3  /  DBili  x   /  AST  10  /  ALT  13  /  AlkPhos  79  12-24    PT/INR - ( 23 Dec 2021 06:51 )   PT: 13.1 sec;   INR: 1.10 ratio         PTT - ( 23 Dec 2021 06:51 )  PTT:26.7 sec                        Consultant(s) Notes Reviewed:      Care Discussed with Consultants/Other Providers:

## 2021-12-24 NOTE — PROGRESS NOTE ADULT - SUBJECTIVE AND OBJECTIVE BOX
CARDIOLOGY     PROGRESS  NOTE   ________________________________________________    CHIEF COMPLAINT:Patient is a 86y old  Female who presents with a chief complaint of fevers/sob (24 Dec 2021 06:42)  doing better.  	  REVIEW OF SYSTEMS:  CONSTITUTIONAL: No fever, weight loss, or fatigue  EYES: No eye pain, visual disturbances, or discharge  ENT:  No difficulty hearing, tinnitus, vertigo; No sinus or throat pain  NECK: No pain or stiffness  RESPIRATORY: No cough, wheezing, chills or hemoptysis; No Shortness of Breath  CARDIOVASCULAR: No chest pain, palpitations, passing out, dizziness, or leg swelling  GASTROINTESTINAL: No abdominal or epigastric pain. No nausea, vomiting, or hematemesis; No diarrhea or constipation. No melena or hematochezia.  GENITOURINARY: No dysuria, frequency, hematuria, or incontinence  NEUROLOGICAL: No headaches, memory loss, loss of strength, numbness, or tremors  SKIN: No itching, burning, rashes, or lesions   LYMPH Nodes: No enlarged glands  ENDOCRINE: No heat or cold intolerance; No hair loss  MUSCULOSKELETAL: No joint pain or swelling; No muscle, back, or extremity pain  PSYCHIATRIC: No depression, anxiety, mood swings, or difficulty sleeping  HEME/LYMPH: No easy bruising, or bleeding gums  ALLERGY AND IMMUNOLOGIC: No hives or eczema	    [ ] All others negative	  [ ] Unable to obtain    PHYSICAL EXAM:  T(C): 36.6 (12-24-21 @ 05:37), Max: 37 (12-23-21 @ 11:00)  HR: 100 (12-24-21 @ 05:37) (72 - 100)  BP: 136/60 (12-24-21 @ 05:37) (94/56 - 136/70)  RR: 18 (12-24-21 @ 05:37) (18 - 20)  SpO2: 100% (12-24-21 @ 05:37) (96% - 100%)  Wt(kg): --  I&O's Summary    23 Dec 2021 07:01  -  24 Dec 2021 07:00  --------------------------------------------------------  IN: 0 mL / OUT: 940 mL / NET: -940 mL        Appearance: Normal	  HEENT:   Normal oral mucosa, PERRL, EOMI	  Lymphatic: No lymphadenopathy  Cardiovascular: Normal S1 S2, No JVD, + murmurs, No edema  Respiratory: Lungs clear to auscultation	  Psychiatry: A & O x 3, Mood & affect appropriate  Gastrointestinal:  Soft, decrease tender, + BS	  Skin: No rashes, No ecchymoses, No cyanosis	  Neurologic: Non-focal  Extremities: Normal range of motion, No clubbing, cyanosis or edema  Vascular: Peripheral pulses palpable 2+ bilaterally    MEDICATIONS  (STANDING):  albuterol/ipratropium for Nebulization 3 milliLiter(s) Nebulizer every 6 hours  allopurinol 100 milliGRAM(s) Oral daily  amLODIPine   Tablet 10 milliGRAM(s) Oral daily  artificial  tears Solution 1 Drop(s) Both EYES two times a day  atorvastatin 20 milliGRAM(s) Oral at bedtime  cefepime   IVPB 1000 milliGRAM(s) IV Intermittent every 8 hours  dextrose 40% Gel 15 Gram(s) Oral once  dextrose 5%. 1000 milliLiter(s) (50 mL/Hr) IV Continuous <Continuous>  dextrose 5%. 1000 milliLiter(s) (100 mL/Hr) IV Continuous <Continuous>  dextrose 50% Injectable 25 Gram(s) IV Push once  dextrose 50% Injectable 25 Gram(s) IV Push once  dextrose 50% Injectable 12.5 Gram(s) IV Push once  glucagon  Injectable 1 milliGRAM(s) IntraMuscular once  heparin   Injectable 5000 Unit(s) SubCutaneous every 8 hours  insulin lispro (ADMELOG) corrective regimen sliding scale   SubCutaneous every 6 hours  lactated ringers. 1000 milliLiter(s) (60 mL/Hr) IV Continuous <Continuous>  levETIRAcetam  Solution 750 milliGRAM(s) Oral two times a day  levothyroxine 75 MICROGram(s) Oral daily  metoprolol tartrate 25 milliGRAM(s) Oral two times a day  metroNIDAZOLE  IVPB 500 milliGRAM(s) IV Intermittent every 12 hours  vancomycin  IVPB 1500 milliGRAM(s) IV Intermittent every 24 hours      TELEMETRY: 	    ECG:  	  RADIOLOGY:  OTHER: 	  	  LABS:	 	    CARDIAC MARKERS:                                9.4    14.70 )-----------( 248      ( 23 Dec 2021 06:51 )             29.9     12-23    136  |  100  |  19  ----------------------------<  248<H>  3.4<L>   |  23  |  0.76    Ca    8.9      23 Dec 2021 06:51  Phos  3.6     12-23  Mg     2.0     12-23    TPro  6.5  /  Alb  3.3  /  TBili  0.4  /  DBili  x   /  AST  8<L>  /  ALT  12  /  AlkPhos  91  12-23    proBNP: Serum Pro-Brain Natriuretic Peptide: 375 pg/mL (12-19 @ 08:45)    Lipid Profile:   HgA1c:   TSH:   PT/INR - ( 23 Dec 2021 06:51 )   PT: 13.1 sec;   INR: 1.10 ratio         PTT - ( 23 Dec 2021 06:51 )  PTT:26.7 sec  NO anticoagulation 24 hours  Recovery, then home if stable  100cc pus 10.2F drain placed in rlq  Provided by Anesthesia Department  -Diet: NPO with tube feeds.   - No acute surgical intervention given perforated appendicitis with large associated abscess  - Blood cultures 12/22: preliminary growth gram positive cocci in clusters.  - CT: RLQ abscess is larger.  - Continue antibiotics    1) Perforated appendicitis  Abscess in setting suspected perforated appendix  s/p IR drainage 12/23  --To avoid Vanc/Zosyn combo will switch to Cefepime 1g IV Q8H and Metronidazole 500 mg IV Q12H  --Follow up on IR drainage cultures    2) Positive BCX, Therapeutic Drug Monitoring  BCx (12/19) with 3/4 Staph epi  BCx (12/21) with 2/4 Staph epi  TTE (12/21) with possible vegetation on the left or non-coronary cusp of the aortic valve  --Recommend adjusting Vancomycin to 1.5g IV Q24H. Check trough prior to third dose. Target trough of 15-20.   --Continue to monitor renal function and vancomycin levels to avoid nephrotoxicity / otoxicity secondary to vancomycin  --Recommend 2x repeat BCx tomorrow AM.   --Ideally (clinical status permitting) MIKALA should be pursued    Culture - Blood in AM (12.21.21 @ 17:15)    Gram Stain:   Growth in anaerobic bottle: Gram Positive Cocci in Clusters    Specimen Source: .Blood Blood-Peripheral    Culture Results:   Growth in anaerobic bottle: Staphylococcus epidermidis "Susceptibilities  not performed"  < from: Transthoracic Echocardiogram (12.21.21 @ 14:39) >  Mitral Valve: Mitral annular calcification. Minimal mitral  regurgitation.  Aortic Valve/Aorta: Moderate aortic stenosis:  ---LVOTd 2.14 cm. VTI 18.0 cm.  ---Aortic valve VTI50.4 cm. Mean gradient 13.7 mmHg. DI =  0.36.  ---Aortic valve area by continuity 1.3 cm2.  There may be a vegetation on the left or non-coronary cusp.  Normal aortic root size.  Left Atrium: Normal left atrium.  Left Ventricle: Normal left ventricular internal dimensions  and wall thickness.  Normal left ventricular systolic function. No segmental  wall motion abnormalities. Indeterminate diastolic  function.  Right Heart: Normal right atrium. Normal right ventricular  size and function. Tricuspid valve not well visualized.  Minimal tricuspid regurgitation. Pulmonic valve not well  visualized. No pulmonic regurgitation.  Pericardium/Pleura: Normal pericardium with no pericardial  effusion.  Hemodynamic: No evidence of pulmonary hypertension.  ------------------------------------------------------------------------  Conclusions:  Normal left ventricular systolic function. No segmental  wall motion abnormalities.  Moderate aortic stenosis.  There may be a vegetation on the left or non-coronary cusp  of the aortic valve. Consider transesophageal  echocardiography.        Assessment and plan  ---------------------------  87 yo F from [facility], CVA with residual left weakness (~3 years ago), PEG for dysphagia, hypothyroid, COPD (on no home Oxygen), HTN, gout, p/w fever, Pt has respiratory distress, wheezing, concerning for respiratory infection, otherwise no other symptoms is reported on the chart. EMS gave solumedrol 125 through peripheral, small iv line.  pt is well known to me with hx of htn, ashd, s/p MI, cva with increasing sob on bipap in er.  can not get any hx from the pt.  sob ?respiratory failure ?sec to pneumoniae  ?pleural effusion, check ct chest noted  continue bp meds  dvt prophylaxis  abx  will consider to possible repeat echo  duoneb  dvt prophylaxis  ct scan/ surgery/ ID noted  continue amlodipine and Metoprolol for now  tachycardia sec to underlying condition, continue to observe  oob to chair as tolerated  IR, continue abx/ surgery noted  incentive spirometry  replete K  + BC/ ?vegetation on av, will consider MIKALA for better evaluation of the valve  continue vanco/ abx as per ID  repeat blood cultures

## 2021-12-24 NOTE — PROGRESS NOTE ADULT - ASSESSMENT
86   year old female      h/o hemorrhagic CVA, has  PEG,  HTN, MI,      HLD, gout   right Ca breast. s/p mastectomy in 2019,  s/p  sentinel node  localization.  4/4,  were  negative         *  p/w SOB and  acute respiratory distress    was  on   bipap  in er   with  elevated wbc of 17,000 on arrival,  from pna/  probable aspiration/ gram negative pna  cxr,? pl effusion, right  lung opacity  CT c, ordered   *   s/p cva, has  PEG,  npo/,  on  synthroid, Keppra  ,  *  HTN, on meds  per  card  prior  echo,  normal ef  *  h/o ca breast. /, s/p  R  mastectomy  *  bacteremia. / staph epi, meth R  spoke  with ID d r oey  ct  c/a/p, ?  pna, perforated  appendicitis,  ?  mets  to  acetabulum   surg/ IR  and  oncology eval dr pacheco    appreciate  excellent  care of  house  staff  per  surg, no  intervention  Staph epi,   from 12/21, on iv vanco, . Cefepime   and flagyl   on iv fluids/    rpt ct  noted,  RLL  phlegmon,  and  80  cc of  pus  drained by  IR   rpt ct in 2  weeks, follow  hb today         rd< from: Xray Chest 1 View- PORTABLE-Urgent (12.19.21 @ 09:20) >  IMPRESSION:  Low lung volumes. New small left lower lung hazy opacity may represent   atelectasis versus pleural effusion. Redemonstration of right upper lung   perihilar opacity largely unchanged prior exam.  --- End of Report --  < end of copied text >             86   year old female      h/o hemorrhagic CVA, has  PEG,  HTN, MI,      HLD, gout   right Ca breast. s/p mastectomy in 2019,  s/p  sentinel node  localization.  4/4,  were  negative         *  p/w SOB and  acute respiratory distress,  was  on   bipap  in er   with  elevated wbc of 17,000 on arrival,  from pna/  probable aspiration/ gram negative pna  cxr,? pl effusion, right  lung opacit   *   s/p cva, has  PEG,  npo/,  on  synthroid, Keppra  ,  *  HTN, on meds  per  card  prior  echo,  normal ef  *  h/o ca breast. /, s/p  R  mastectomy  *  bacteremia. / staph epi, meth R  ct  c/a/p, ?  pna, perforated  appendicitis,  ?  mets  to  acetabulum   surg/ IR  and  oncology eval dr pacheco    appreciate  excellent  care of  house  staff  per  surg, no  intervention  *   sepsis  on  arrival, is  resolving  from  perforated  appendix/ and  Staph epi  bacteremia which is  persistent,  hence  cannot  dismiss  it as  a contaminant    and  also, with  echo, vegetation on  aortic  valve       on iv vanco, . Cefepime   and flagyl   rpt ct  noted,  RLL  phlegmon,  and  80  cc of  pus  drained by  IR   rpt ct in 2  weeks, follow  hb today         rd< from: Xray Chest 1 View- PORTABLE-Urgent (12.19.21 @ 09:20) >  IMPRESSION:  Low lung volumes. New small left lower lung hazy opacity may represent   atelectasis versus pleural effusion. Redemonstration of right upper lung   perihilar opacity largely unchanged prior exam.  --- End of Report --  < end of copied text >

## 2021-12-24 NOTE — PROGRESS NOTE ADULT - ASSESSMENT
84 yo F with CVA, PEG, COPD, presenting with fevers, SOB  Leukocytosis, fever  CT concerning for perforated appendicitis, atelectasis, pancreatic lesion  BCX with CoNS--no apparent cardiac device (note L shoulder hardware in place)--3/4  Suspect CoNS is colonizer  Generally stable to improved today    1) Perforated appendicitis  Abscess in setting suspected perforated appendix  s/p IR drainage 12/23  --To avoid Vanc/Zosyn combo will switch to Cefepime 1g IV Q8H and Metronidazole 500 mg IV Q12H  --Follow up on IR drainage cultures    2) Positive BCX, Therapeutic Drug Monitoring  BCx (12/19) with 3/4 Staph epi  BCx (12/21) with 2/4 Staph epi  TTE (12/21) with possible vegetation on the left or non-coronary cusp of the aortic valve  --Recommend adjusting Vancomycin to 1.5g IV Q24H. Check trough prior to third dose. Target trough of 15-20.   --Continue to monitor renal function and vancomycin levels to avoid nephrotoxicity / otoxicity secondary to vancomycin  --Recommend 2x repeat BCx tomorrow AM.   --Ideally (clinical status permitting) MIKALA should be pursued    3) Leukocytosis, Fever  - Trend to normal  - Monitor for alternate sources    I will be away over this upcoming weekend. Please contact the Infectious Diseases Office with any further questions or concerns.     Justice Corona M.D.  [facility] Division of Infectious Disease  8AM-5PM: Pager Number [phone]  After Hours (or if no response): Please contact the Infectious Diseases Office at [phone]     The above assessment and plan were discussed with medicine resident  86 yo F with CVA, PEG, COPD, presenting with fevers, SOB  Leukocytosis, fever  CT concerning for perforated appendicitis, atelectasis, pancreatic lesion  BCX with CoNS--no apparent cardiac device (note L shoulder hardware in place)--3/4  Suspect CoNS is colonizer  Generally stable to improved today    1) Perforated appendicitis  Abscess in setting suspected perforated appendix  s/p IR drainage 12/23  --To avoid Vanc/Zosyn combo will switched to Cefepime 1g IV Q8H and Metronidazole 500 mg IV Q12H  --Follow up on IR drainage cultures are pending    2) Positive BCX, Therapeutic Drug Monitoring  BCx (12/19) with 3/4 Staph epi  BCx (12/21) with 2/4 Staph epi  TTE (12/21) with possible vegetation on the left or non-coronary cusp of the aortic valve  --Recommend adjusting Vancomycin to 1.5g IV Q24H. Check trough prior to third dose. Target trough of 15-20.   --Continue to monitor renal function and vancomycin levels to avoid nephrotoxicity / otoxicity secondary to vancomycin  --Recommend  repeat BCx tomorrow AM.   --Ideally (clinical status permitting) MIKALA should be pursued  -    3) Leukocytosis, Fever  - Trend to normal  - ?PEG area as source of staph epi high grade bacteremia but suggestive of intravascular focus at this point    Andrea Grover MD  [phone]  After 5pm/weekends [phone]

## 2021-12-24 NOTE — PROGRESS NOTE ADULT - PROBLEM SELECTOR PLAN 2
Concern for fever w/ hypoxia, sob w/ CT chest and CXR findings concerning for PNA, asp vs CAP, likely cause of hypoxia  - Will treat w/ zosyn for now  - chest PT, airway clearance  - BiPAP if worsening, stable VBG off BiPAP  - urine legionella neg  - Bcx growing gram pos cocci, coag neg,  likely contaminant as per ID, consistent on repeat  - c/w vancomycin, cefepime and flagyl  - TTE w/ signs of possible vegetation on non-coronary cusp of aortic valve  - f/u repeat cultures  - wean off NC as tolerated

## 2021-12-25 NOTE — PROGRESS NOTE ADULT - SUBJECTIVE AND OBJECTIVE BOX
Patient is a 86y old  Female who presents with a chief complaint of fevers/sob (24 Dec 2021 13:03)      SUBJECTIVE / OVERNIGHT EVENTS:    MEDICATIONS  (STANDING):  albuterol/ipratropium for Nebulization 3 milliLiter(s) Nebulizer every 6 hours  allopurinol 100 milliGRAM(s) Oral daily  amLODIPine   Tablet 10 milliGRAM(s) Oral daily  artificial  tears Solution 1 Drop(s) Both EYES two times a day  atorvastatin 20 milliGRAM(s) Oral at bedtime  cefepime   IVPB 1000 milliGRAM(s) IV Intermittent every 8 hours  dextrose 40% Gel 15 Gram(s) Oral once  dextrose 5%. 1000 milliLiter(s) (50 mL/Hr) IV Continuous <Continuous>  dextrose 5%. 1000 milliLiter(s) (100 mL/Hr) IV Continuous <Continuous>  dextrose 50% Injectable 25 Gram(s) IV Push once  dextrose 50% Injectable 12.5 Gram(s) IV Push once  dextrose 50% Injectable 25 Gram(s) IV Push once  glucagon  Injectable 1 milliGRAM(s) IntraMuscular once  heparin   Injectable 5000 Unit(s) SubCutaneous every 8 hours  insulin lispro (ADMELOG) corrective regimen sliding scale   SubCutaneous every 6 hours  levETIRAcetam  Solution 750 milliGRAM(s) Oral two times a day  levothyroxine 75 MICROGram(s) Oral daily  metoprolol tartrate 25 milliGRAM(s) Oral two times a day  metroNIDAZOLE  IVPB 500 milliGRAM(s) IV Intermittent every 12 hours  vancomycin  IVPB 1500 milliGRAM(s) IV Intermittent every 24 hours    MEDICATIONS  (PRN):  acetaminophen     Tablet .. 650 milliGRAM(s) Oral every 6 hours PRN Temp greater or equal to 38C (100.4F), Mild Pain (1 - 3)  HYDROmorphone  Injectable 0.25 milliGRAM(s) IV Push every 10 minutes PRN Moderate Pain (4 - 6)  melatonin 3 milliGRAM(s) Oral at bedtime PRN Insomnia      Vital Signs Last 24 Hrs  T(C): 36.8 (25 Dec 2021 04:59), Max: 36.8 (25 Dec 2021 04:59)  T(F): 98.3 (25 Dec 2021 04:59), Max: 98.3 (25 Dec 2021 04:59)  HR: 103 (25 Dec 2021 04:59) (83 - 103)  BP: 164/83 (25 Dec 2021 04:59) (110/56 - 164/83)  BP(mean): --  RR: 18 (25 Dec 2021 04:59) (18 - 18)  SpO2: 91% (25 Dec 2021 04:59) (91% - 100%)  CAPILLARY BLOOD GLUCOSE      POCT Blood Glucose.: 181 mg/dL (25 Dec 2021 06:35)  POCT Blood Glucose.: 162 mg/dL (25 Dec 2021 00:13)  POCT Blood Glucose.: 153 mg/dL (24 Dec 2021 17:26)  POCT Blood Glucose.: 183 mg/dL (24 Dec 2021 12:07)    I&O's Summary    24 Dec 2021 07:01  -  25 Dec 2021 07:00  --------------------------------------------------------  IN: 0 mL / OUT: 30 mL / NET: -30 mL        PHYSICAL EXAM:  GENERAL: NAD, well-developed, stable on NC  HEAD:  Atraumatic, Normocephalic  EYES: EOMI, PERRLA, conjunctiva and sclera clear  NECK: Supple, No JVD  CHEST/LUNG: Clear to auscultation bilaterally; No wheeze  HEART: Regular rate and rhythm; No murmurs, rubs, or gallops  ABDOMEN: Abdomen: soft, softly distended, mild periumbilical tenderness, no rebound or guarding, LUQ PEG in place with mild erythema and signs of excoriation over insertion site  EXTREMITIES:  2+ Peripheral Pulses, No clubbing, cyanosis, or edema  PSYCH: AAOx3  NEUROLOGY: non-focal  SKIN: No rashes or lesions    LABS:                        8.9    12.63 )-----------( 265      ( 24 Dec 2021 11:12 )             27.6     12-24    137  |  104  |  16  ----------------------------<  207<H>  4.2   |  20<L>  |  0.65    Ca    8.3<L>      24 Dec 2021 11:12  Phos  3.0     12-24  Mg     1.9     12-24    TPro  5.9<L>  /  Alb  2.7<L>  /  TBili  0.3  /  DBili  x   /  AST  10  /  ALT  13  /  AlkPhos  79  12-24              RADIOLOGY & ADDITIONAL TESTS:    Imaging Personally Reviewed:    Consultant(s) Notes Reviewed:      Care Discussed with Consultants/Other Providers:   Patient is a 86y old  Female who presents with a chief complaint of fevers/sob (24 Dec 2021 13:03)      SUBJECTIVE / OVERNIGHT EVENTS:    Pt reports feeling well this morning. Denies fevers, chills, chest p/p, sob, n/v/d.     MEDICATIONS  (STANDING):  albuterol/ipratropium for Nebulization 3 milliLiter(s) Nebulizer every 6 hours  allopurinol 100 milliGRAM(s) Oral daily  amLODIPine   Tablet 10 milliGRAM(s) Oral daily  artificial  tears Solution 1 Drop(s) Both EYES two times a day  atorvastatin 20 milliGRAM(s) Oral at bedtime  cefepime   IVPB 1000 milliGRAM(s) IV Intermittent every 8 hours  dextrose 40% Gel 15 Gram(s) Oral once  dextrose 5%. 1000 milliLiter(s) (50 mL/Hr) IV Continuous <Continuous>  dextrose 5%. 1000 milliLiter(s) (100 mL/Hr) IV Continuous <Continuous>  dextrose 50% Injectable 25 Gram(s) IV Push once  dextrose 50% Injectable 12.5 Gram(s) IV Push once  dextrose 50% Injectable 25 Gram(s) IV Push once  glucagon  Injectable 1 milliGRAM(s) IntraMuscular once  heparin   Injectable 5000 Unit(s) SubCutaneous every 8 hours  insulin lispro (ADMELOG) corrective regimen sliding scale   SubCutaneous every 6 hours  levETIRAcetam  Solution 750 milliGRAM(s) Oral two times a day  levothyroxine 75 MICROGram(s) Oral daily  metoprolol tartrate 25 milliGRAM(s) Oral two times a day  metroNIDAZOLE  IVPB 500 milliGRAM(s) IV Intermittent every 12 hours  vancomycin  IVPB 1500 milliGRAM(s) IV Intermittent every 24 hours    MEDICATIONS  (PRN):  acetaminophen     Tablet .. 650 milliGRAM(s) Oral every 6 hours PRN Temp greater or equal to 38C (100.4F), Mild Pain (1 - 3)  HYDROmorphone  Injectable 0.25 milliGRAM(s) IV Push every 10 minutes PRN Moderate Pain (4 - 6)  melatonin 3 milliGRAM(s) Oral at bedtime PRN Insomnia      Vital Signs Last 24 Hrs  T(C): 36.8 (25 Dec 2021 04:59), Max: 36.8 (25 Dec 2021 04:59)  T(F): 98.3 (25 Dec 2021 04:59), Max: 98.3 (25 Dec 2021 04:59)  HR: 103 (25 Dec 2021 04:59) (83 - 103)  BP: 164/83 (25 Dec 2021 04:59) (110/56 - 164/83)  BP(mean): --  RR: 18 (25 Dec 2021 04:59) (18 - 18)  SpO2: 91% (25 Dec 2021 04:59) (91% - 100%)  CAPILLARY BLOOD GLUCOSE      POCT Blood Glucose.: 181 mg/dL (25 Dec 2021 06:35)  POCT Blood Glucose.: 162 mg/dL (25 Dec 2021 00:13)  POCT Blood Glucose.: 153 mg/dL (24 Dec 2021 17:26)  POCT Blood Glucose.: 183 mg/dL (24 Dec 2021 12:07)    I&O's Summary    24 Dec 2021 07:01  -  25 Dec 2021 07:00  --------------------------------------------------------  IN: 0 mL / OUT: 30 mL / NET: -30 mL        PHYSICAL EXAM:  GENERAL: NAD, well-developed, stable on NC  HEAD:  Atraumatic, Normocephalic  EYES: EOMI, PERRLA, conjunctiva and sclera clear  NECK: Supple, No JVD  CHEST/LUNG: Clear to auscultation bilaterally; No wheeze  HEART: Regular rate and rhythm; No murmurs, rubs, or gallops  ABDOMEN: Abdomen: soft, softly distended, mild periumbilical tenderness, no rebound or guarding, LUQ PEG in place with mild erythema and signs of excoriation over insertion site  EXTREMITIES:  2+ Peripheral Pulses, No clubbing, cyanosis, or edema  PSYCH: AAOx3  NEUROLOGY: non-focal  SKIN: No rashes or lesions    LABS:                        8.9    12.63 )-----------( 265      ( 24 Dec 2021 11:12 )             27.6     12-24    137  |  104  |  16  ----------------------------<  207<H>  4.2   |  20<L>  |  0.65    Ca    8.3<L>      24 Dec 2021 11:12  Phos  3.0     12-24  Mg     1.9     12-24    TPro  5.9<L>  /  Alb  2.7<L>  /  TBili  0.3  /  DBili  x   /  AST  10  /  ALT  13  /  AlkPhos  79  12-24              RADIOLOGY & ADDITIONAL TESTS:    Imaging Personally Reviewed:    Consultant(s) Notes Reviewed:      Care Discussed with Consultants/Other Providers:

## 2021-12-25 NOTE — PROGRESS NOTE ADULT - SUBJECTIVE AND OBJECTIVE BOX
CARDIOLOGY     PROGRESS  NOTE   ________________________________________________    CHIEF COMPLAINT:Patient is a 86y old  Female who presents with a chief complaint of fevers/sob (25 Dec 2021 08:24)  no complain.  	  REVIEW OF SYSTEMS:  CONSTITUTIONAL: No fever, weight loss, or fatigue  EYES: No eye pain, visual disturbances, or discharge  ENT:  No difficulty hearing, tinnitus, vertigo; No sinus or throat pain  NECK: No pain or stiffness  RESPIRATORY: No cough, wheezing, chills or hemoptysis; No Shortness of Breath  CARDIOVASCULAR: No chest pain, palpitations, passing out, dizziness, or leg swelling  GASTROINTESTINAL: No abdominal or epigastric pain. No nausea, vomiting, or hematemesis; No diarrhea or constipation. No melena or hematochezia.  GENITOURINARY: No dysuria, frequency, hematuria, or incontinence  NEUROLOGICAL: No headaches, memory loss, loss of strength, numbness, or tremors  SKIN: No itching, burning, rashes, or lesions   LYMPH Nodes: No enlarged glands  ENDOCRINE: No heat or cold intolerance; No hair loss  MUSCULOSKELETAL: No joint pain or swelling; No muscle, back, or extremity pain  PSYCHIATRIC: No depression, anxiety, mood swings, or difficulty sleeping  HEME/LYMPH: No easy bruising, or bleeding gums  ALLERGY AND IMMUNOLOGIC: No hives or eczema	    [ ] All others negative	  [ ] Unable to obtain    PHYSICAL EXAM:  T(C): 36.8 (12-25-21 @ 04:59), Max: 36.8 (12-25-21 @ 04:59)  HR: 103 (12-25-21 @ 04:59) (83 - 103)  BP: 164/83 (12-25-21 @ 04:59) (110/56 - 164/83)  RR: 18 (12-25-21 @ 04:59) (18 - 18)  SpO2: 91% (12-25-21 @ 04:59) (91% - 98%)  Wt(kg): --  I&O's Summary    24 Dec 2021 07:01  -  25 Dec 2021 07:00  --------------------------------------------------------  IN: 0 mL / OUT: 30 mL / NET: -30 mL        Appearance: Normal	  HEENT:   Normal oral mucosa, PERRL, EOMI	  Lymphatic: No lymphadenopathy  Cardiovascular: Normal S1 S2, No JVD, + murmurs, No edema  Respiratory: rhonchi  Psychiatry: A & O x 3, Mood & affect appropriate  Gastrointestinal:  Soft, + mildly tender, + BS	  Skin: No rashes, No ecchymoses, No cyanosis	  Neurologic: Non-focal  Extremities: Normal range of motion, No clubbing, cyanosis or edema  Vascular: Peripheral pulses palpable 2+ bilaterally    MEDICATIONS  (STANDING):  albuterol/ipratropium for Nebulization 3 milliLiter(s) Nebulizer every 6 hours  allopurinol 100 milliGRAM(s) Oral daily  amLODIPine   Tablet 10 milliGRAM(s) Oral daily  artificial  tears Solution 1 Drop(s) Both EYES two times a day  atorvastatin 20 milliGRAM(s) Oral at bedtime  cefepime   IVPB 1000 milliGRAM(s) IV Intermittent every 8 hours  dextrose 40% Gel 15 Gram(s) Oral once  dextrose 5%. 1000 milliLiter(s) (50 mL/Hr) IV Continuous <Continuous>  dextrose 5%. 1000 milliLiter(s) (100 mL/Hr) IV Continuous <Continuous>  dextrose 50% Injectable 25 Gram(s) IV Push once  dextrose 50% Injectable 12.5 Gram(s) IV Push once  dextrose 50% Injectable 25 Gram(s) IV Push once  glucagon  Injectable 1 milliGRAM(s) IntraMuscular once  heparin   Injectable 5000 Unit(s) SubCutaneous every 8 hours  insulin lispro (ADMELOG) corrective regimen sliding scale   SubCutaneous every 6 hours  levETIRAcetam  Solution 750 milliGRAM(s) Oral two times a day  levothyroxine 75 MICROGram(s) Oral daily  metoprolol tartrate 25 milliGRAM(s) Oral two times a day  metroNIDAZOLE  IVPB 500 milliGRAM(s) IV Intermittent every 12 hours  vancomycin  IVPB 1500 milliGRAM(s) IV Intermittent every 24 hours      TELEMETRY: 	    ECG:  	  RADIOLOGY:  OTHER: 	  	  LABS:	 	    CARDIAC MARKERS:                                9.9    10.89 )-----------( 247      ( 25 Dec 2021 07:18 )             32.1     12-25    137  |  101  |  13  ----------------------------<  182<H>  4.1   |  22  |  0.61    Ca    8.9      25 Dec 2021 07:18  Phos  2.8     12-25  Mg     2.0     12-25    TPro  6.6  /  Alb  3.3  /  TBili  0.4  /  DBili  x   /  AST  11  /  ALT  16  /  AlkPhos  85  12-25    proBNP: Serum Pro-Brain Natriuretic Peptide: 375 pg/mL (12-19 @ 08:45)    Lipid Profile:   HgA1c:   TSH:   < from: Transthoracic Echocardiogram (12.21.21 @ 14:39) >  Mitral Valve: Mitral annular calcification. Minimal mitral  regurgitation.  Aortic Valve/Aorta: Moderate aortic stenosis:  ---LVOTd 2.14 cm. VTI 18.0 cm.  ---Aortic valve VTI50.4 cm. Mean gradient 13.7 mmHg. DI =  0.36.  ---Aortic valve area by continuity 1.3 cm2.  There may be a vegetation on the left or non-coronary cusp.  Normal aortic root size.  Left Atrium: Normal left atrium.  Left Ventricle: Normal left ventricular internal dimensions  and wall thickness.  Normal left ventricular systolic function. No segmental  wall motion abnormalities. Indeterminate diastolic  function.  Right Heart: Normal right atrium. Normal right ventricular  size and function. Tricuspid valve not well visualized.  Minimal tricuspid regurgitation. Pulmonic valve not well  visualized. No pulmonic regurgitation.  Pericardium/Pleura: Normal pericardium with no pericardial  effusion.  Hemodynamic: No evidence of pulmonary hypertension.  ------------------------------------------------------------------------  Conclusions:  Normal left ventricular systolic function. No segmental  wall motion abnormalities.  Moderate aortic stenosis.  There may be a vegetation on the left or non-coronary cusp  of the aortic valve. Consider transesophageal  echocardiography.    < end of copied text >        Assessment and plan  ---------------------------  87 [facility] residual left weakness (~3 years ago), PEG for dysphagia, hypothyroid, COPD (on no home Oxygen), HTN, gout, p/w fever, Pt has respiratory distress, wheezing, concerning for respiratory infection, otherwise no other symptoms is reported on the chart. EMS gave solumedrol 125 through peripheral, small iv line.  pt is well known to me with hx of htn, ashd, s/p MI, cva with increasing sob on bipap in er.  can not get any hx from the pt.  sob ?respiratory failure ?sec to pneumoniae  ?pleural effusion, check ct chest noted  continue bp meds  dvt prophylaxis  abx  will consider to possible repeat echo  duoneb  dvt prophylaxis  ct scan/ surgery/ ID noted  continue amlodipine and Metoprolol for now  tachycardia sec to underlying condition, continue to observe  oob to chair as tolerated  IR, continue abx/ surgery noted  incentive spirometry  replete K  + BC/ ?vegetation on av, will consider MIKALA for better evaluation of the valve  continue vanco/ abx as per ID  repeat blood cultures  increase metoprolol to 50 mg bid sec to increase bp

## 2021-12-25 NOTE — PROGRESS NOTE ADULT - SUBJECTIVE AND OBJECTIVE BOX
afebrile    REVIEW OF SYSTEMS:  GEN: no fever,    no chills  RESP: no SOB,   no cough  CVS: no chest pain,   no palpitations  GI: no abdominal pain,   no nausea,   no vomiting,   no constipation,   no diarrhea  : no dysuria,   no frequency  NEURO: no headache,   no dizziness  PSYCH: no depression,   not anxious  Derm : no rash    MEDICATIONS  (STANDING):  albuterol/ipratropium for Nebulization 3 milliLiter(s) Nebulizer every 6 hours  allopurinol 100 milliGRAM(s) Oral daily  amLODIPine   Tablet 10 milliGRAM(s) Oral daily  artificial  tears Solution 1 Drop(s) Both EYES two times a day  atorvastatin 20 milliGRAM(s) Oral at bedtime  cefepime   IVPB 1000 milliGRAM(s) IV Intermittent every 8 hours  dextrose 40% Gel 15 Gram(s) Oral once  dextrose 5%. 1000 milliLiter(s) (50 mL/Hr) IV Continuous <Continuous>  dextrose 5%. 1000 milliLiter(s) (100 mL/Hr) IV Continuous <Continuous>  dextrose 50% Injectable 25 Gram(s) IV Push once  dextrose 50% Injectable 12.5 Gram(s) IV Push once  dextrose 50% Injectable 25 Gram(s) IV Push once  glucagon  Injectable 1 milliGRAM(s) IntraMuscular once  heparin   Injectable 5000 Unit(s) SubCutaneous every 8 hours  insulin lispro (ADMELOG) corrective regimen sliding scale   SubCutaneous every 6 hours  levETIRAcetam  Solution 750 milliGRAM(s) Oral two times a day  levothyroxine 75 MICROGram(s) Oral daily  metoprolol tartrate 25 milliGRAM(s) Oral two times a day  metroNIDAZOLE  IVPB 500 milliGRAM(s) IV Intermittent every 12 hours  vancomycin  IVPB 1500 milliGRAM(s) IV Intermittent every 24 hours    MEDICATIONS  (PRN):  acetaminophen     Tablet .. 650 milliGRAM(s) Oral every 6 hours PRN Temp greater or equal to 38C (100.4F), Mild Pain (1 - 3)  HYDROmorphone  Injectable 0.25 milliGRAM(s) IV Push every 10 minutes PRN Moderate Pain (4 - 6)  melatonin 3 milliGRAM(s) Oral at bedtime PRN Insomnia      Vital Signs Last 24 Hrs  T(C): 36.8 (25 Dec 2021 04:59), Max: 36.8 (25 Dec 2021 04:59)  T(F): 98.3 (25 Dec 2021 04:59), Max: 98.3 (25 Dec 2021 04:59)  HR: 103 (25 Dec 2021 04:59) (83 - 103)  BP: 164/83 (25 Dec 2021 04:59) (110/56 - 164/83)  BP(mean): --  RR: 18 (25 Dec 2021 04:59) (18 - 18)  SpO2: 91% (25 Dec 2021 04:59) (91% - 100%)  CAPILLARY BLOOD GLUCOSE      POCT Blood Glucose.: 181 mg/dL (25 Dec 2021 06:35)  POCT Blood Glucose.: 162 mg/dL (25 Dec 2021 00:13)  POCT Blood Glucose.: 153 mg/dL (24 Dec 2021 17:26)  POCT Blood Glucose.: 183 mg/dL (24 Dec 2021 12:07)    I&O's Summary    24 Dec 2021 07:01  -  25 Dec 2021 07:00  --------------------------------------------------------  IN: 0 mL / OUT: 30 mL / NET: -30 mL        PHYSICAL EXAM:  HEAD:  Atraumatic, Normocephalic  NECK: Supple, No   JVD  CHEST/LUNG:   no     rales,     no,    rhonchi  HEART: Regular rate and rhythm;         murmur  ABDOMEN: Soft,  less tender, ;   EXTREMITIES:    no    edema  NEUROLOGY:  alert    LABS:                        9.9    10.89 )-----------( 247      ( 25 Dec 2021 07:18 )             32.1     12-25    137  |  101  |  13  ----------------------------<  182<H>  4.1   |  22  |  0.61    Ca    8.9      25 Dec 2021 07:18  Phos  2.8     12-25  Mg     2.0     12-25    TPro  6.6  /  Alb  3.3  /  TBili  0.4  /  DBili  x   /  AST  11  /  ALT  16  /  AlkPhos  85  12-25                            Consultant(s) Notes Reviewed:      Care Discussed with Consultants/Other Providers:

## 2021-12-25 NOTE — PROGRESS NOTE ADULT - ASSESSMENT
86   year old female      h/o hemorrhagic CVA, has  PEG,  HTN, MI,      HLD, gout   right Ca breast. s/p mastectomy in 2019,  s/p  sentinel node  localization.  4/4,  were  negative         *  p/w SOB and  acute respiratory distress,  was  on   bipap  in er   with  elevated wbc of 17,000 on arrival,  from pna/  probable aspiration/ gram negative pna  cxr,? pl effusion, right  lung opacit   *   s/p cva, has  PEG,  npo/,  on  synthroid, Keppra  ,  *  HTN, on meds  per  card  prior  echo,  normal ef  *  h/o ca breast. /, s/p  R  mastectomy  *  bacteremia. / staph epi, meth R  ct  c/a/p, ?  pna, perforated  appendicitis,  ?  mets  to  acetabulum   surg/ IR  and  oncology eval dr pacheco    appreciate  excellent  care of  house  staff  per  surg, no  intervention  *   sepsis  on  arrival, is  resolving  from  perforated  appendix/ and  Staph epi  bacteremia which is  persistent,  hence  cannot  dismiss  it as  a contaminant    and  also, with  echo, vegetation on  aortic  valve/ endocarditis, ?  esdras,  may  not  change   rx  plan,  will need  extended  course  of  ab       on iv vanco, . Cefepime   and flagyl   rpt ct  noted,  RLL  phlegmon,  and  80  cc of  pus  drained by  IR .  c/s  with  ecoli, e coccus  and  morganella  rpt ct in 2  weeks         rd< from: Xray Chest 1 View- PORTABLE-Urgent (12.19.21 @ 09:20) >  IMPRESSION:  Low lung volumes. New small left lower lung hazy opacity may represent   atelectasis versus pleural effusion. Redemonstration of right upper lung   perihilar opacity largely unchanged prior exam.  --- End of Report --  < end of copied text >             86   year old female      h/o hemorrhagic CVA, has  PEG,  HTN, MI,      HLD, gout   right Ca breast. s/p mastectomy in 2019,  s/p  sentinel node  localization.  4/4,  were  negative         *  p/w SOB and  acute respiratory distress,  was  on   bipap  in er   with  elevated wbc of 17,000 on arrival,  from pna/  probable aspiration/ gram negative pna  cxr,? pl effusion, right  lung opacit   *   s/p cva, has  PEG,  npo/,  on  synthroid, Keppra  ,  *  HTN, on meds  per  card  prior  echo,  normal ef  *  h/o ca breast. /, s/p  R  mastectomy  *  bacteremia. / staph epi, meth R  ct  c/a/p, ?  pna, perforated  appendicitis,  ?  mets  to  acetabulum   surg/ IR  and  oncology eval dr pacheco    appreciate  excellent  care of  house  staff  per  surg, no  intervention  *   sepsis  on  arrival, is  resolving  from  perforated  appendix/ and  Staph epi  bacteremia which is  persistent,  hence  cannot  dismiss  it as  a contaminant    and  also, with  echo, vegetation on  aortic  valve/ endocarditis, ?  esdras,  may  not  change   rx  plan,  will need  extended  course  of  ab       on iv vanco, . Cefepime   and flagyl   rpt ct  noted,  RLL  phlegmon,  and  80  cc of  pus  drained by  IR .  c/s  with  ecoli, e coccus  and  morganella  rpt ct in 2  weeks/ has abd  drain         rd< from: Xray Chest 1 View- PORTABLE-Urgent (12.19.21 @ 09:20) >  IMPRESSION:  Low lung volumes. New small left lower lung hazy opacity may represent   atelectasis versus pleural effusion. Redemonstration of right upper lung   perihilar opacity largely unchanged prior exam.  --- End of Report --  < end of copied text >

## 2021-12-26 NOTE — PROGRESS NOTE ADULT - SUBJECTIVE AND OBJECTIVE BOX
CARDIOLOGY     PROGRESS  NOTE   ________________________________________________    CHIEF COMPLAINT:Patient is a 86y old  Female who presents with a chief complaint of fevers/sob (26 Dec 2021 10:53)  doing better.  	  REVIEW OF SYSTEMS:  CONSTITUTIONAL: No fever, weight loss, or fatigue  EYES: No eye pain, visual disturbances, or discharge  ENT:  No difficulty hearing, tinnitus, vertigo; No sinus or throat pain  NECK: No pain or stiffness  RESPIRATORY: No cough, wheezing, chills or hemoptysis; No Shortness of Breath  CARDIOVASCULAR: No chest pain, palpitations, passing out, dizziness, or leg swelling  GASTROINTESTINAL: No abdominal or epigastric pain. No nausea, vomiting, or hematemesis; No diarrhea or constipation. No melena or hematochezia.  GENITOURINARY: No dysuria, frequency, hematuria, or incontinence  NEUROLOGICAL: No headaches, memory loss, loss of strength, numbness, or tremors  SKIN: No itching, burning, rashes, or lesions   LYMPH Nodes: No enlarged glands  ENDOCRINE: No heat or cold intolerance; No hair loss  MUSCULOSKELETAL: No joint pain or swelling; No muscle, back, or extremity pain  PSYCHIATRIC: No depression, anxiety, mood swings, or difficulty sleeping  HEME/LYMPH: No easy bruising, or bleeding gums  ALLERGY AND IMMUNOLOGIC: No hives or eczema	    [ ] All others negative	  [ ] Unable to obtain    PHYSICAL EXAM:  T(C): 36.9 (12-26-21 @ 04:53), Max: 36.9 (12-26-21 @ 04:53)  HR: 92 (12-26-21 @ 05:57) (81 - 95)  BP: 138/80 (12-26-21 @ 04:52) (131/58 - 138/80)  RR: 18 (12-26-21 @ 04:53) (18 - 21)  SpO2: 95% (12-26-21 @ 05:57) (90% - 98%)  Wt(kg): --  I&O's Summary    25 Dec 2021 07:01  -  26 Dec 2021 07:00  --------------------------------------------------------  IN: 0 mL / OUT: 935 mL / NET: -935 mL        Appearance: Normal	  HEENT:   Normal oral mucosa, PERRL, EOMI	  Lymphatic: No lymphadenopathy  Cardiovascular: Normal S1 S2, No JVD, + murmurs, No edema  Respiratory: Lungs clear to auscultation	  Psychiatry: A & O x 3, Mood & affect appropriate  Gastrointestinal:  Soft, + mild tender, + BS	  Skin: No rashes, No ecchymoses, No cyanosis	  Neurologic: Non-focal  Extremities: Normal range of motion, No clubbing, cyanosis or edema  Vascular: Peripheral pulses palpable 2+ bilaterally    MEDICATIONS  (STANDING):  albuterol/ipratropium for Nebulization 3 milliLiter(s) Nebulizer every 6 hours  allopurinol 100 milliGRAM(s) Oral daily  amLODIPine   Tablet 10 milliGRAM(s) Oral daily  artificial  tears Solution 1 Drop(s) Both EYES two times a day  atorvastatin 20 milliGRAM(s) Oral at bedtime  cefepime   IVPB 1000 milliGRAM(s) IV Intermittent every 8 hours  dextrose 40% Gel 15 Gram(s) Oral once  dextrose 5%. 1000 milliLiter(s) (50 mL/Hr) IV Continuous <Continuous>  dextrose 5%. 1000 milliLiter(s) (100 mL/Hr) IV Continuous <Continuous>  dextrose 50% Injectable 25 Gram(s) IV Push once  dextrose 50% Injectable 12.5 Gram(s) IV Push once  dextrose 50% Injectable 25 Gram(s) IV Push once  glucagon  Injectable 1 milliGRAM(s) IntraMuscular once  heparin   Injectable 5000 Unit(s) SubCutaneous every 8 hours  insulin lispro (ADMELOG) corrective regimen sliding scale   SubCutaneous every 6 hours  levETIRAcetam  Solution 750 milliGRAM(s) Oral two times a day  levothyroxine 75 MICROGram(s) Oral daily  metoprolol tartrate 25 milliGRAM(s) Oral two times a day  metroNIDAZOLE  IVPB 500 milliGRAM(s) IV Intermittent every 12 hours  vancomycin  IVPB 1500 milliGRAM(s) IV Intermittent every 24 hours      TELEMETRY: 	    ECG:  	  RADIOLOGY:  OTHER: 	  	  LABS:	 	    CARDIAC MARKERS:                                9.5    8.58  )-----------( 256      ( 26 Dec 2021 06:47 )             30.1     12-26    133<L>  |  95<L>  |  19  ----------------------------<  214<H>  4.1   |  25  |  0.66    Ca    9.3      26 Dec 2021 06:46  Phos  3.0     12-26  Mg     2.1     12-26    TPro  6.5  /  Alb  3.1<L>  /  TBili  0.4  /  DBili  x   /  AST  14  /  ALT  16  /  AlkPhos  81  12-26    proBNP: Serum Pro-Brain Natriuretic Peptide: 375 pg/mL (12-19 @ 08:45)    Lipid Profile:   HgA1c:   TSH:   Culture - Abscess with Gram Stain (12.23.21 @ 18:49)    -  Amikacin: S <=16    -  Amikacin: S <=16    -  Amoxicillin/Clavulanic Acid: R <=8/4    -  Amoxicillin/Clavulanic Acid: S <=8/4    -  Ampicillin: R >16 These ampicillin results predict results for amoxicillin    -  Ampicillin: R <=8 These ampicillin results predict results for amoxicillin    -  Ampicillin: S <=2 Predicts results to ampicillin/sulbactam, amoxacillin-clavulanate and  piperacillin-tazobactam.    -  Ampicillin/Sulbactam: S <=4/2 Enterobacter, Klebsiella aerogenes, Citrobacter, and Serratia may develop resistance during prolonged therapy (3-4 days)    -  Ampicillin/Sulbactam: I 16/8 Enterobacter, Klebsiella aerogenes, Citrobacter, and Serratia may develop resistance during prolonged therapy (3-4 days)    -  Aztreonam: S <=4    -  Aztreonam: S <=4    -  Cefazolin: R 4 Enterobacter, Klebsiella aerogenes, Citrobacter, and Serratia may develop resistance during prolonged therapy (3-4 days)    -  Cefazolin: I 4 Enterobacter, Klebsiella aerogenes, Citrobacter, and Serratia may develop resistance during prolonged therapy (3-4 days)    -  Cefepime: S <=2    -  Cefepime: S <=2    -  Cefoxitin: S <=8    -  Cefoxitin: S <=8    -  Ceftriaxone: S <=1 Enterobacter, Klebsiella aerogenes, Citrobacter, and Serratia may develop resistance during prolonged therapy    -  Ceftriaxone: S <=1 Enterobacter, Klebsiella aerogenes, Citrobacter, and Serratia may develop resistance during prolonged therapy    -  Ciprofloxacin: S <=0.25    -  Ciprofloxacin: R >2    -  Ertapenem: S <=0.5    -  Ertapenem: S <=0.5    -  Gentamicin: S <=2    -  Gentamicin: S <=2    -  Imipenem: S <=1    -  Imipenem: S <=1    -  Levofloxacin: S <=0.5    -  Levofloxacin: R >4    -  Meropenem: S <=1    -  Meropenem: S <=1    -  Piperacillin/Tazobactam: S <=8    -  Piperacillin/Tazobactam: S <=8    -  Tetra/Doxy: R >8    -  Tobramycin: S <=2    -  Tobramycin: S <=2    -  Trimethoprim/Sulfamethoxazole: R >2/38    -  Trimethoprim/Sulfamethoxazole: S 1/19    -  Vancomycin: S 0.5    Specimen Source: .Abscess abdominal abscess    Culture Results:   Culture yields >4 types of aerobic and/or anaerobic bacteria  Call client services within 7 days if further workup is clinically  indicated. Culture includes  Few Escherichia coli  Few Morganella morganii  Moderate Enterococcus avium  Numerous Bacteroides thetaiotamcron group "Susceptibilities not performed"  Numerous Clostridium ramosum "Susceptibilities not performed"    Organism Identification: Escherichia coli  Morganella morganii  Enterococcus avium    Organism: Escherichia coli    Organism: Morganella morganii    Organism: Enterococcus avium    Method Type: TAWANA    Method Type: TAWANA    Method Type: TAWANA    1) Perforated appendicitis  Abscess in setting suspected perforated appendix  s/p IR drainage 12/23  --To avoid Vanc/Zosyn combo will switched to Cefepime 1g IV Q8H and Metronidazole 500 mg IV Q12H  --Follow up on IR drainage cultures are pending    2) Positive BCX, Therapeutic Drug Monitoring  BCx (12/19) with 3/4 Staph epi  BCx (12/21) with 2/4 Staph epi  TTE (12/21) with possible vegetation on the left or non-coronary cusp of the aortic valve  --Recommend adjusting Vancomycin to 1.5g IV Q24H. Check trough prior to third dose. Target trough of 15-20.   --Continue to monitor renal function and vancomycin levels to avoid nephrotoxicity / otoxicity secondary to vancomycin  --Recommend  repeat BCx tomorrow AM.   --Ideally (clinical status permitting) MIKALA should be pursued  -      Assessment and plan  ---------------------------  85 yo F from [facility], CVA with residual left weakness (~3 years ago), PEG for dysphagia, hypothyroid, COPD (on no home Oxygen), HTN, gout, p/w fever, Pt has respiratory distress, wheezing, concerning for respiratory infection, otherwise no other symptoms is reported on the chart. EMS gave solumedrol 125 through peripheral, small iv line.  pt is well known to me with hx of htn, ashd, s/p MI, cva with increasing sob on bipap in er.  can not get any hx from the pt.  sob ?respiratory failure ?sec to pneumoniae  ?pleural effusion, check ct chest noted  continue bp meds  dvt prophylaxis  abx  will consider to possible repeat echo  duoneb  dvt prophylaxis  ct scan/ surgery/ ID noted  continue amlodipine and Metoprolol for now  tachycardia sec to underlying condition, continue to observe  oob to chair as tolerated  IR, continue abx/ surgery noted  incentive spirometry  replete K  + BC/ ?vegetation on av, will consider MIKALA for better evaluation of the valve, pt is moderate risk for MIKALA and possible intubation sec to her sig sleep apnea and body habitus,  will discuss with ID  continue vanco/ abx as per ID  repeat blood cultures, negative  increase metoprolol to 50 mg bid sec to increase bp

## 2021-12-26 NOTE — PROGRESS NOTE ADULT - SUBJECTIVE AND OBJECTIVE BOX
afbrile    REVIEW OF SYSTEMS:  GEN: no fever,    no chills  RESP: no SOB,   no cough  CVS: no chest pain,   no palpitations  GI: no abdominal pain,   no nausea,   no vomiting,   no constipation,   no diarrhea  : no dysuria,   no frequency  NEURO: no headache,   no dizziness  PSYCH: no depression,   not anxious  Derm : no rash    MEDICATIONS  (STANDING):  albuterol/ipratropium for Nebulization 3 milliLiter(s) Nebulizer every 6 hours  allopurinol 100 milliGRAM(s) Oral daily  amLODIPine   Tablet 10 milliGRAM(s) Oral daily  artificial  tears Solution 1 Drop(s) Both EYES two times a day  atorvastatin 20 milliGRAM(s) Oral at bedtime  cefepime   IVPB 1000 milliGRAM(s) IV Intermittent every 8 hours  dextrose 40% Gel 15 Gram(s) Oral once  dextrose 5%. 1000 milliLiter(s) (50 mL/Hr) IV Continuous <Continuous>  dextrose 5%. 1000 milliLiter(s) (100 mL/Hr) IV Continuous <Continuous>  dextrose 50% Injectable 25 Gram(s) IV Push once  dextrose 50% Injectable 12.5 Gram(s) IV Push once  dextrose 50% Injectable 25 Gram(s) IV Push once  glucagon  Injectable 1 milliGRAM(s) IntraMuscular once  heparin   Injectable 5000 Unit(s) SubCutaneous every 8 hours  insulin lispro (ADMELOG) corrective regimen sliding scale   SubCutaneous every 6 hours  levETIRAcetam  Solution 750 milliGRAM(s) Oral two times a day  levothyroxine 75 MICROGram(s) Oral daily  metoprolol tartrate 25 milliGRAM(s) Oral two times a day  metroNIDAZOLE  IVPB 500 milliGRAM(s) IV Intermittent every 12 hours  vancomycin  IVPB 1500 milliGRAM(s) IV Intermittent every 24 hours    MEDICATIONS  (PRN):  acetaminophen     Tablet .. 650 milliGRAM(s) Oral every 6 hours PRN Temp greater or equal to 38C (100.4F), Mild Pain (1 - 3)  HYDROmorphone  Injectable 0.25 milliGRAM(s) IV Push every 10 minutes PRN Moderate Pain (4 - 6)  melatonin 3 milliGRAM(s) Oral at bedtime PRN Insomnia      Vital Signs Last 24 Hrs  T(C): 36.9 (26 Dec 2021 04:53), Max: 36.9 (26 Dec 2021 04:53)  T(F): 98.5 (26 Dec 2021 04:53), Max: 98.5 (26 Dec 2021 04:53)  HR: 92 (26 Dec 2021 05:57) (81 - 95)  BP: 138/80 (26 Dec 2021 04:52) (131/58 - 138/80)  BP(mean): --  RR: 18 (26 Dec 2021 04:53) (18 - 21)  SpO2: 95% (26 Dec 2021 05:57) (90% - 98%)  CAPILLARY BLOOD GLUCOSE      POCT Blood Glucose.: 228 mg/dL (26 Dec 2021 06:25)  POCT Blood Glucose.: 212 mg/dL (26 Dec 2021 01:10)  POCT Blood Glucose.: 182 mg/dL (25 Dec 2021 17:41)  POCT Blood Glucose.: 168 mg/dL (25 Dec 2021 12:11)    I&O's Summary    25 Dec 2021 07:01  -  26 Dec 2021 07:00  --------------------------------------------------------  IN: 0 mL / OUT: 935 mL / NET: -935 mL        PHYSICAL EXAM:  HEAD:  Atraumatic, Normocephalic  NECK: Supple, No   JVD  CHEST/LUNG:   no     rales,     no,    rhonchi  HEART: Regular rate and rhythm;         murmur  ABDOMEN: Soft, Nontender, ;   EXTREMITIES:        edema  NEUROLOGY:  alert    LABS:                        9.5    8.58  )-----------( 256      ( 26 Dec 2021 06:47 )             30.1     12-26    133<L>  |  95<L>  |  19  ----------------------------<  214<H>  4.1   |  25  |  0.66    Ca    9.3      26 Dec 2021 06:46  Phos  3.0     12-26  Mg     2.1     12-26    TPro  6.5  /  Alb  3.1<L>  /  TBili  0.4  /  DBili  x   /  AST  14  /  ALT  16  /  AlkPhos  81  12-26                            Consultant(s) Notes Reviewed:      Care Discussed with Consultants/Other Providers:

## 2021-12-26 NOTE — PROGRESS NOTE ADULT - ASSESSMENT
86   year old female      h/o hemorrhagic CVA, has  PEG,  HTN, MI,      HLD, gout   right Ca breast. s/p mastectomy in 2019,  s/p  sentinel node  localization.  4/4,  were  negative         *  p/w SOB and  acute respiratory distress,  was  on   bipap  in er   with  elevated wbc of 17,000 on arrival,  from pna/  probable aspiration/ gram negative pna  cxr,? pl effusion, right  lung opacit   *   s/p cva, has  PEG,  npo/,  on  synthroid, Keppra  ,  *  HTN, on meds  per  card  prior  echo,  normal ef  *  h/o ca breast. /, s/p  R  mastectomy  *  bacteremia. / staph epi, meth R  ct  c/a/p, ?  pna, perforated  appendicitis,  ?  mets  to  acetabulum   surg/ IR  and  oncology eval dr pacheco    appreciate  excellent  care of  house  staff  per  surg, no  intervention  *   sepsis  on  arrival, is  resolving  from  perforated  appendix/ and  Staph epi  bacteremia which is  persistent,  hence  cannot  dismiss  it as  a contaminant    and  also, with  echo, vegetation on  aortic  valve/ endocarditis, ?  esdras,  may  not  change   rx  plan,  will need  extended  course  of  ab   / will defer to card    on iv vanco, . Cefepime   and flagyl   rpt ct  noted,  RLL  phlegmon,  and  80  cc of  pus  drained by  IR .  c/s  with  ecoli, e coccus  and  morganella  rpt ct in 2  weeks/ has abd  drain  continue  current rx  plan         rd< from: Xray Chest 1 View- PORTABLE-Urgent (12.19.21 @ 09:20) >  IMPRESSION:  Low lung volumes. New small left lower lung hazy opacity may represent   atelectasis versus pleural effusion. Redemonstration of right upper lung   perihilar opacity largely unchanged prior exam.  --- End of Report --  < end of copied text >

## 2021-12-26 NOTE — PROGRESS NOTE ADULT - SUBJECTIVE AND OBJECTIVE BOX
**************************************  Ramses Anne, PGY-1  Pager: # [phone] / [phone]  Senior Resident: Vinay Wade  After 7PM, please contact night float at #[phone] or [phone]  **************************************    INTERVAL HPI/OVERNIGHT EVENTS:  Patient was seen and examined at bedside. As per nurse and patient, no o/n events, patient resting comfortably. No complaints at this time.     VITAL SIGNS:  T(F): 98.1 (12-26-21 @ 12:01)  HR: 90 (12-26-21 @ 12:01)  BP: 128/74 (12-26-21 @ 12:01)  RR: 18 (12-26-21 @ 12:01)  SpO2: 91% (12-26-21 @ 12:01)    PHYSICAL EXAM:    HEAD:  Atraumatic, Normocephalic  NECK: Supple, No   JVD  CHEST/LUNG:   no     rales,     no,    rhonchi  HEART: Regular rate and rhythm;         murmur  ABDOMEN: Soft, Nontender, ;   EXTREMITIES:        edema  NEUROLOGY:  alert      MEDICATIONS  (STANDING):  albuterol/ipratropium for Nebulization 3 milliLiter(s) Nebulizer every 6 hours  allopurinol 100 milliGRAM(s) Oral daily  amLODIPine   Tablet 10 milliGRAM(s) Oral daily  artificial  tears Solution 1 Drop(s) Both EYES two times a day  atorvastatin 20 milliGRAM(s) Oral at bedtime  cefepime   IVPB 1000 milliGRAM(s) IV Intermittent every 8 hours  dextrose 40% Gel 15 Gram(s) Oral once  dextrose 5%. 1000 milliLiter(s) (50 mL/Hr) IV Continuous <Continuous>  dextrose 5%. 1000 milliLiter(s) (100 mL/Hr) IV Continuous <Continuous>  dextrose 50% Injectable 25 Gram(s) IV Push once  dextrose 50% Injectable 12.5 Gram(s) IV Push once  dextrose 50% Injectable 25 Gram(s) IV Push once  glucagon  Injectable 1 milliGRAM(s) IntraMuscular once  heparin   Injectable 5000 Unit(s) SubCutaneous every 8 hours  insulin lispro (ADMELOG) corrective regimen sliding scale   SubCutaneous every 6 hours  levETIRAcetam  Solution 750 milliGRAM(s) Oral two times a day  levothyroxine 75 MICROGram(s) Oral daily  metoprolol tartrate 50 milliGRAM(s) Oral two times a day  metroNIDAZOLE  IVPB 500 milliGRAM(s) IV Intermittent every 12 hours  vancomycin  IVPB 1500 milliGRAM(s) IV Intermittent every 24 hours    MEDICATIONS  (PRN):  acetaminophen     Tablet .. 650 milliGRAM(s) Oral every 6 hours PRN Temp greater or equal to 38C (100.4F), Mild Pain (1 - 3)  HYDROmorphone  Injectable 0.25 milliGRAM(s) IV Push every 10 minutes PRN Moderate Pain (4 - 6)  melatonin 3 milliGRAM(s) Oral at bedtime PRN Insomnia      Allergies    Toradol (Urticaria (Mild to Mod); Rash (Mild to Mod))    Intolerances        LABS:                        9.5    8.58  )-----------( 256      ( 26 Dec 2021 06:47 )             30.1     12-26    133<L>  |  95<L>  |  19  ----------------------------<  214<H>  4.1   |  25  |  0.66    Ca    9.3      26 Dec 2021 06:46  Phos  3.0     12-26  Mg     2.1     12-26    TPro  6.5  /  Alb  3.1<L>  /  TBili  0.4  /  DBili  x   /  AST  14  /  ALT  16  /  AlkPhos  81  12-26          RADIOLOGY & ADDITIONAL TESTS:  Reviewed

## 2021-12-27 NOTE — DISCHARGE NOTE PROVIDER - NSDCMRMEDTOKEN_GEN_ALL_CORE_FT
acetaminophen 160 mg/5 mL oral suspension: 20 milliliter(s) orally every 6 hours, As needed, Moderate Pain (4 - 6), Severe Pain (7 - 10)  allopurinol 300 mg oral tablet: 1 tab(s) by gastrostomy tube once a day   amLODIPine 10 mg oral tablet: 1 tab(s) by gastrostomy tube once a day   atorvastatin 20 mg oral tablet: 1 tab(s) orally once a day  Biotene Moisturizing Mouth oral spray: 2 spray(s) orally 3 times a day, As Needed  folic acid 1 mg oral tablet: 1 tab(s) orally once a day  ipratropium-albuterol 0.5 mg-2.5 mg/3 mL inhalation solution: 3 milliliter(s) inhaled every 6 hours  levETIRAcetam 100 mg/mL oral solution: 7.5 milliliter(s) by gastrostomy tube 2 times a day   levothyroxine 75 mcg (0.075 mg) oral tablet: 1 tab(s) orally once a day  metoprolol tartrate 25 mg oral tablet: 1 tab(s) by gastrostomy tube 2 times a day   ocular lubricant ophthalmic solution: 2 drop(s) to each affected eye 3 times a day  predniSONE 20 mg oral tablet: 2 tab(s) orally once a day stop on 6/2/21  Vitamin B12 1000 mcg oral tablet: 1 tab(s) orally once a day   acetaminophen 160 mg/5 mL oral suspension: 20 milliliter(s) orally every 6 hours, As needed, Moderate Pain (4 - 6), Severe Pain (7 - 10)  allopurinol 300 mg oral tablet: 1 tab(s) by gastrostomy tube once a day   amLODIPine 10 mg oral tablet: 1 tab(s) by gastrostomy tube once a day   atorvastatin 20 mg oral tablet: 1 tab(s) orally once a day  Biotene Moisturizing Mouth oral spray: 2 spray(s) orally 3 times a day, As Needed  ertapenem 1 g injection: 1 gram(s) intravenous once a day  folic acid 1 mg oral tablet: 1 tab(s) orally once a day  heparin: 5000 unit(s) subcutaneous every 8 hours  ipratropium-albuterol 0.5 mg-2.5 mg/3 mL inhalation solution: 3 milliliter(s) inhaled every 6 hours  Labs: -CBC, CMP, vancomycin trough once a week while on antibitoics  -End Date: 2/3/22  -ICD 10 R78.81  levETIRAcetam 100 mg/mL oral solution: 7.5 milliliter(s) by gastrostomy tube 2 times a day   levothyroxine 75 mcg (0.075 mg) oral tablet: 1 tab(s) orally once a day  melatonin 3 mg oral tablet: 1 tab(s) orally once a day (at bedtime), As needed, Insomnia  metoprolol tartrate 25 mg oral tablet: 1 tab(s) by gastrostomy tube 2 times a day   nystatin 100,000 units/g topical cream: 1 application topically 2 times a day  ocular lubricant ophthalmic solution: 2 drop(s) to each affected eye 3 times a day  vancomycin 1 g intravenous injection: 1 gram(s) intravenous every 24 hours  Vitamin B12 1000 mcg oral tablet: 1 tab(s) orally once a day

## 2021-12-27 NOTE — PROGRESS NOTE ADULT - PROBLEM SELECTOR PLAN 2
Concern for fever w/ hypoxia, sob w/ CT chest and CXR findings concerning for PNA, asp vs CAP, likely cause of hypoxia  - Will treat w/ zosyn for now  - chest PT, airway clearance  - BiPAP if worsening, stable VBG off BiPAP  - urine legionella neg  - Bcx growing gram pos cocci, coag neg,  likely contaminant as per ID, consistent on repeat  - c/w vancomycin, cefepime and flagyl  - TTE w/ signs of possible vegetation on non-coronary cusp of aortic valve  - repeat cultures from 12/24 neg  - wean off NC as tolerated

## 2021-12-27 NOTE — DISCHARGE NOTE PROVIDER - DETAILS OF MALNUTRITION DIAGNOSIS/DIAGNOSES
This patient has been assessed with a concern for Malnutrition and was treated during this hospitalization for the following Nutrition diagnosis/diagnoses:     -  12/20/2021: Morbid obesity (BMI > 40)

## 2021-12-27 NOTE — PROGRESS NOTE ADULT - SUBJECTIVE AND OBJECTIVE BOX
Patient is a 86y old  Female who presents with a chief complaint of fevers/sob (26 Dec 2021 17:00)      INTERVAL HPI/OVERNIGHT EVENTS: adrian, pt doing well this am, c/o LLE pain.       REVIEW OF SYSTEMS:    CONSTITUTIONAL: No weakness, fevers or chills  EYES/ENT: No visual changes;  No vertigo or throat pain   NECK: No pain or stiffness  RESPIRATORY: No cough, wheezing, hemoptysis; No shortness of breath  CARDIOVASCULAR: No chest pain or palpitations  GASTROINTESTINAL: No abdominal or epigastric pain. No nausea, vomiting, or hematemesis; No diarrhea or constipation. No melena or hematochezia.  GENITOURINARY: No dysuria, frequency or hematuria  NEUROLOGICAL: No numbness, +L-sided weakness  All other review of systems is negative unless indicated above.    FAMILY HISTORY:  No pertinent family history in first degree relatives      T(C): 36.8 (12-27-21 @ 04:38), Max: 36.8 (12-26-21 @ 20:49)  HR: 90 (12-27-21 @ 06:15) (86 - 107)  BP: 108/67 (12-27-21 @ 04:38) (108/67 - 132/79)  RR: 18 (12-27-21 @ 04:38) (18 - 18)  SpO2: 98% (12-27-21 @ 06:15) (91% - 99%)  Wt(kg): --Vital Signs Last 24 Hrs  T(C): 36.8 (27 Dec 2021 04:38), Max: 36.8 (26 Dec 2021 20:49)  T(F): 98.2 (27 Dec 2021 04:38), Max: 98.2 (26 Dec 2021 20:49)  HR: 90 (27 Dec 2021 06:15) (86 - 107)  BP: 108/67 (27 Dec 2021 04:38) (108/67 - 132/79)  BP(mean): --  RR: 18 (27 Dec 2021 04:38) (18 - 18)  SpO2: 98% (27 Dec 2021 06:15) (91% - 99%)    PHYSICAL EXAM:  GENERAL: NAD, well-groomed, well-developed  HEAD:  Atraumatic, Normocephalic  EYES: EOMI, PERRLA, conjunctiva and sclera clear  ENMT: No tonsillar erythema, exudates, or enlargement; Moist mucous membranes, Good dentition, No lesions  NECK: Supple, No JVD, Normal thyroid  NERVOUS SYSTEM:  Alert & Oriented X2, Good concentration; Motor Strength reduced on L side  CHEST/LUNG: + expiratory wheezing, on 3L NC  HEART: Regular rate and rhythm; + systolic murmur  ABDOMEN: Soft, Nontender, Nondistended; Bowel sounds present  EXTREMITIES:  2+ Peripheral Pulses, No clubbing, cyanosis, or edema  LYMPH: No lymphadenopathy noted  SKIN: No rashes or lesions    Consultant(s) Notes Reviewed:  [x ] YES  [ ] NO  Care Discussed with Consultants/Other Providers [ x] YES  [ ] NO    LABS:      Ca    9.3        26 Dec 2021 06:46        CAPILLARY BLOOD GLUCOSE      POCT Blood Glucose.: 187 mg/dL (27 Dec 2021 06:13)  POCT Blood Glucose.: 212 mg/dL (27 Dec 2021 00:07)  POCT Blood Glucose.: 161 mg/dL (26 Dec 2021 17:41)  POCT Blood Glucose.: 142 mg/dL (26 Dec 2021 12:13)    BLOOD CULTURE  12-24 @ 17:38   No growth to date.  --  --  12-24 @ 15:22   No growth to date.  --  --  12-23 @ 18:49   Culture yields >4 types of aerobic and/or anaerobic bacteria  Call client services within 7 days if further workup is clinically  indicated. Culture includes  Few Escherichia coli  Few Morganella morganii  Moderate Enterococcus avium  Numerous Bacteroides thetaiotamcron group "Susceptibilities not performed"  Numerous Clostridium ramosum "Susceptibilities not performed"  --  Escherichia coli      RADIOLOGY & ADDITIONAL TESTS:    Imaging Personally Reviewed:  [ ] YES  [ ] NO  acetaminophen     Tablet .. 650 milliGRAM(s) Oral every 6 hours PRN  albuterol/ipratropium for Nebulization 3 milliLiter(s) Nebulizer every 6 hours  allopurinol 100 milliGRAM(s) Oral daily  amLODIPine   Tablet 10 milliGRAM(s) Oral daily  artificial  tears Solution 1 Drop(s) Both EYES two times a day  atorvastatin 20 milliGRAM(s) Oral at bedtime  cefepime   IVPB 1000 milliGRAM(s) IV Intermittent every 8 hours  dextrose 40% Gel 15 Gram(s) Oral once  dextrose 5%. 1000 milliLiter(s) IV Continuous <Continuous>  dextrose 5%. 1000 milliLiter(s) IV Continuous <Continuous>  dextrose 50% Injectable 25 Gram(s) IV Push once  dextrose 50% Injectable 12.5 Gram(s) IV Push once  dextrose 50% Injectable 25 Gram(s) IV Push once  glucagon  Injectable 1 milliGRAM(s) IntraMuscular once  heparin   Injectable 5000 Unit(s) SubCutaneous every 8 hours  HYDROmorphone  Injectable 0.25 milliGRAM(s) IV Push every 10 minutes PRN  insulin lispro (ADMELOG) corrective regimen sliding scale   SubCutaneous every 6 hours  levETIRAcetam  Solution 750 milliGRAM(s) Oral two times a day  levothyroxine 75 MICROGram(s) Oral daily  melatonin 3 milliGRAM(s) Oral at bedtime PRN  metoprolol tartrate 50 milliGRAM(s) Oral two times a day  metroNIDAZOLE  IVPB 500 milliGRAM(s) IV Intermittent every 12 hours  vancomycin  IVPB 1500 milliGRAM(s) IV Intermittent every 24 hours      HEALTH ISSUES - PROBLEM Dx:  Perforated appendicitis    Sepsis with acute hypoxic respiratory failure    Dysphagia    CVA (cerebral vascular accident)    Prophylactic measure    COPD exacerbation    Pancreatic lesion    Endometrial hyperplasia, unspecified    Endocarditis

## 2021-12-27 NOTE — PROGRESS NOTE ADULT - SUBJECTIVE AND OBJECTIVE BOX
CARDIOLOGY     PROGRESS  NOTE   ________________________________________________    CHIEF COMPLAINT:Patient is a 86y old  Female who presents with a chief complaint of fevers/sob (27 Dec 2021 09:40)  no complain.  	  REVIEW OF SYSTEMS:  CONSTITUTIONAL: No fever, weight loss, or fatigue  EYES: No eye pain, visual disturbances, or discharge  ENT:  No difficulty hearing, tinnitus, vertigo; No sinus or throat pain  NECK: No pain or stiffness  RESPIRATORY: No cough, wheezing, chills or hemoptysis; decrease  Shortness of Breath  CARDIOVASCULAR: No chest pain, palpitations, passing out, dizziness, or leg swelling  GASTROINTESTINAL: No abdominal or epigastric pain. No nausea, vomiting, or hematemesis; No diarrhea or constipation. No melena or hematochezia.  GENITOURINARY: No dysuria, frequency, hematuria, or incontinence  NEUROLOGICAL: No headaches, memory loss, loss of strength, numbness, or tremors  SKIN: No itching, burning, rashes, or lesions   LYMPH Nodes: No enlarged glands  ENDOCRINE: No heat or cold intolerance; No hair loss  MUSCULOSKELETAL: No joint pain or swelling; No muscle, back, or extremity pain  PSYCHIATRIC: No depression, anxiety, mood swings, or difficulty sleeping  HEME/LYMPH: No easy bruising, or bleeding gums  ALLERGY AND IMMUNOLOGIC: No hives or eczema	    [ ] All others negative	  [ ] Unable to obtain    PHYSICAL EXAM:  T(C): 36.8 (12-27-21 @ 04:38), Max: 36.8 (12-26-21 @ 20:49)  HR: 90 (12-27-21 @ 06:15) (86 - 107)  BP: 108/67 (12-27-21 @ 04:38) (108/67 - 132/79)  RR: 18 (12-27-21 @ 04:38) (18 - 18)  SpO2: 98% (12-27-21 @ 06:15) (91% - 99%)  Wt(kg): --  I&O's Summary    26 Dec 2021 07:01  -  27 Dec 2021 07:00  --------------------------------------------------------  IN: 1930 mL / OUT: 1350 mL / NET: 580 mL        Appearance: Normal	  HEENT:   Normal oral mucosa, PERRL, EOMI	  Lymphatic: No lymphadenopathy  Cardiovascular: Normal S1 S2, No JVD, + murmurs, No edema  Respiratory: Lungs clear to auscultation	  Psychiatry: A & O x 3, Mood & affect appropriate  Gastrointestinal:  Soft, Non-tender, + BS, peg	  Skin: No rashes, No ecchymoses, No cyanosis	  Neurologic: Non-focal  Extremities: Normal range of motion, No clubbing, cyanosis or edema  Vascular: Peripheral pulses palpable 2+ bilaterally    MEDICATIONS  (STANDING):  albuterol/ipratropium for Nebulization 3 milliLiter(s) Nebulizer every 6 hours  allopurinol 100 milliGRAM(s) Oral daily  amLODIPine   Tablet 10 milliGRAM(s) Oral daily  artificial  tears Solution 1 Drop(s) Both EYES two times a day  atorvastatin 20 milliGRAM(s) Oral at bedtime  cefepime   IVPB 1000 milliGRAM(s) IV Intermittent every 8 hours  dextrose 40% Gel 15 Gram(s) Oral once  dextrose 5%. 1000 milliLiter(s) (50 mL/Hr) IV Continuous <Continuous>  dextrose 5%. 1000 milliLiter(s) (100 mL/Hr) IV Continuous <Continuous>  dextrose 50% Injectable 25 Gram(s) IV Push once  dextrose 50% Injectable 12.5 Gram(s) IV Push once  dextrose 50% Injectable 25 Gram(s) IV Push once  glucagon  Injectable 1 milliGRAM(s) IntraMuscular once  heparin   Injectable 5000 Unit(s) SubCutaneous every 8 hours  insulin lispro (ADMELOG) corrective regimen sliding scale   SubCutaneous every 6 hours  levETIRAcetam  Solution 750 milliGRAM(s) Oral two times a day  levothyroxine 75 MICROGram(s) Oral daily  metoprolol tartrate 50 milliGRAM(s) Oral two times a day  metroNIDAZOLE  IVPB 500 milliGRAM(s) IV Intermittent every 12 hours      TELEMETRY: 	    ECG:  	  RADIOLOGY:  OTHER: 	  	  LABS:	 	    CARDIAC MARKERS:                                9.5    8.58  )-----------( 256      ( 26 Dec 2021 06:47 )             30.1     12-26    133<L>  |  95<L>  |  19  ----------------------------<  214<H>  4.1   |  25  |  0.66    Ca    9.3      26 Dec 2021 06:46  Phos  3.0     12-26  Mg     2.1     12-26    TPro  6.5  /  Alb  3.1<L>  /  TBili  0.4  /  DBili  x   /  AST  14  /  ALT  16  /  AlkPhos  81  12-26    proBNP: Serum Pro-Brain Natriuretic Peptide: 375 pg/mL (12-19 @ 08:45)    Lipid Profile:   HgA1c:   TSH:         Assessment and plan  ---------------------------  85 yo F from [facility], CVA with residual left weakness (~3 years ago), PEG for dysphagia, hypothyroid, COPD (on no home Oxygen), HTN, gout, p/w fever, Pt has respiratory distress, wheezing, concerning for respiratory infection, otherwise no other symptoms is reported on the chart. EMS gave solumedrol 125 through peripheral, small iv line.  pt is well known to me with hx of htn, ashd, s/p MI, cva with increasing sob on bipap in er.  can not get any hx from the pt.  sob ?respiratory failure ?sec to pneumoniae  ?pleural effusion, check ct chest noted  continue bp meds  dvt prophylaxis  abx  will consider to possible repeat echo  duoneb  dvt prophylaxis  ct scan/ surgery/ ID noted  continue amlodipine and Metoprolol for now  tachycardia sec to underlying condition, continue to observe  oob to chair as tolerated  IR, continue abx/ surgery noted  incentive spirometry  replete K  + BC/ ?vegetation on av, will consider MIKALA for better evaluation of the valve, pt is moderate risk for MIKALA and possible intubation sec to her sig sleep apnea and body habitus,  will discuss with ID, ?MIKALA tomorrow  continue vanco/ abx as per ID  repeat blood cultures, negative  increase metoprolol to 50 mg bid sec to increase bp observe closely

## 2021-12-27 NOTE — DISCHARGE NOTE PROVIDER - CARE PROVIDERS DIRECT ADDRESSES
,DirectAddress_Unknown,DirectAddress_Unknown,DirectAddress_Unknown,[email or web address]

## 2021-12-27 NOTE — PROGRESS NOTE ADULT - ASSESSMENT
86   year old female      h/o hemorrhagic CVA, has  PEG,  HTN, MI,      HLD, gout   right Ca breast. s/p mastectomy in 2019,  s/p  sentinel node  localization.  4/4,  were  negative         *  p/w SOB and  acute respiratory distress,  was  on   bipap  in er   with  elevated wbc of 17,000 on arrival,  from pna/  probable aspiration/ gram negative pna  cxr,? pl effusion, right  lung opacit   *   s/p cva, has  PEG,  npo/,  on  synthroid, Keppra  ,  *  HTN, on meds  per  card  prior  echo,  normal ef  *  h/o ca breast. /, s/p  R  mastectomy  *  bacteremia. / staph epi, meth R  ct  c/a/p, ?  pna, perforated  appendicitis,  ?  mets  to  acetabulum   surg/ IR  and  oncology eval dr pacheco    appreciate  excellent  care of  house  staff  per  surg, no  intervention  *   sepsis  on  arrival, is  resolving  from  perforated  appendix/ and  Staph epi  bacteremia which is  persistent,  hence  cannot  dismiss  it as  a contaminant    and  also, with  echo, vegetation on  aortic  valve/ endocarditis, ?  esdras,  may  not  change   rx  plan,  will need  extended  course  of  ab   / will defer to card    on iv vanco, . Cefepime   and flagyl   rpt ct  noted,  RLL  phlegmon,  and  80  cc of  pus  drained by  IR .  c/s  with  ecoli, e coccus  and  morganella  rpt ct in 2  weeks/ has abd  drain  has  abd drain/ vanco  level high/  follow random level in am         rd< from: Xray Chest 1 View- PORTABLE-Urgent (12.19.21 @ 09:20) >  IMPRESSION:  Low lung volumes. New small left lower lung hazy opacity may represent   atelectasis versus pleural effusion. Redemonstration of right upper lung   perihilar opacity largely unchanged prior exam.  --- End of Report --  < end of copied text >

## 2021-12-27 NOTE — DISCHARGE NOTE PROVIDER - NSDCCPCAREPLAN_GEN_ALL_CORE_FT
PRINCIPAL DISCHARGE DIAGNOSIS  Diagnosis: Perforated appendicitis  Assessment and Plan of Treatment: Positive BCX, Therapeutic Drug Monitoring  BCx (12/19) with 3/4 Staph epi  BCx (12/21) with 2/4 Staph epi  BCx (12/24) with NGTD  TTE (12/21) with possible vegetation on the left or non-coronary cusp of the aortic valve  --lower Vancomycin 1gm IV Q24H  --Check weekly trough  --cbc, bun/creatinine, vanco trough once weekly while on abx   --Continue to monitor renal function and vancomycin levels to avoid nephrotoxicity / ototoxicity secondary to vancomycin  --Discussed with Cardiology- high risk for MIKALA. Will treat empirically for IE  --total 6 weeks abx *      SECONDARY DISCHARGE DIAGNOSES  Diagnosis: Dysphagia  Assessment and Plan of Treatment: G tube exchanged 1/3/21    Diagnosis: COPD exacerbation  Assessment and Plan of Treatment: Call your Health Care provider upon arrival home to make a follow up appointment within one week.  Take all inhalers as prescribed by your Health Care Provider.  Take steroids as prescribed by your Health Care Provider.  If your cough increases infrequency and severity and/or you have shortness of breath or increased shortness of breath call your Health Care Provider.  If you develop fever, chills, night sweats, malaise, and/or change in mental status call your Health care Provider.  Nutrition is very important.  Eat small frequent meals.  Increase your activity as tolerated.  Do not stay in bed all day      Diagnosis: Bacteremia  Assessment and Plan of Treatment:     Diagnosis: Pancreatic lesion  Assessment and Plan of Treatment: Follow up with Dr. Schultz    Diagnosis: History of breast cancer  Assessment and Plan of Treatment: Patient is status post right simple mastectomy in 4/2019 for right breast [id number] breast cancer, ER, CA positive and Her-2 negative.   She would ideally have received adjuvant hormonal therapy.  Follow up with Dr. Schultz for outpatient bone scan and further imaging of hip as needed

## 2021-12-27 NOTE — PROGRESS NOTE ADULT - SUBJECTIVE AND OBJECTIVE BOX
Follow Up:  bacteremia, perforated appendicitis     Interval History:    REVIEW OF SYSTEMS  [  ] ROS unobtainable because:    [  ] All other systems negative except as noted below    Constitutional:  [ ] fever [ ] chills  [ ] weight loss  [ ] weakness  Skin:  [ ] rash [ ] phlebitis	  Eyes: [ ] icterus [ ] pain  [ ] discharge	  ENMT: [ ] sore throat  [ ] thrush [ ] ulcers [ ] exudates  Respiratory: [ ] dyspnea [ ] hemoptysis [ ] cough [ ] sputum	  Cardiovascular:  [ ] chest pain [ ] palpitations [ ] edema	  Gastrointestinal:  [ ] nausea [ ] vomiting [ ] diarrhea [ ] constipation [ ] pain	  Genitourinary:  [ ] dysuria [ ] frequency [ ] hematuria [ ] discharge [ ] flank pain  [ ] incontinence  Musculoskeletal:  [ ] myalgias [ ] arthralgias [ ] arthritis  [ ] back pain  Neurological:  [ ] headache [ ] seizures  [ ] confusion/altered mental status    Allergies  Toradol (Urticaria (Mild to Mod); Rash (Mild to Mod))        ANTIMICROBIALS:  cefepime   IVPB 1000 every 8 hours  metroNIDAZOLE  IVPB 500 every 12 hours      OTHER MEDS:  MEDICATIONS  (STANDING):  acetaminophen     Tablet .. 650 every 6 hours PRN  albuterol/ipratropium for Nebulization 3 every 6 hours  allopurinol 100 daily  amLODIPine   Tablet 10 daily  atorvastatin 20 at bedtime  dextrose 40% Gel 15 once  dextrose 50% Injectable 25 once  dextrose 50% Injectable 12.5 once  dextrose 50% Injectable 25 once  glucagon  Injectable 1 once  heparin   Injectable 5000 every 8 hours  HYDROmorphone  Injectable 0.25 every 10 minutes PRN  insulin lispro (ADMELOG) corrective regimen sliding scale  every 6 hours  levETIRAcetam  Solution 750 two times a day  levothyroxine 75 daily  melatonin 3 at bedtime PRN  metoprolol tartrate 50 two times a day      Vital Signs Last 24 Hrs  T(C): 36.7 (27 Dec 2021 13:27), Max: 36.8 (26 Dec 2021 20:49)  T(F): 98.1 (27 Dec 2021 13:27), Max: 98.2 (26 Dec 2021 20:49)  HR: 92 (27 Dec 2021 13:27) (84 - 107)  BP: 106/61 (27 Dec 2021 13:27) (106/61 - 132/79)  BP(mean): --  RR: 18 (27 Dec 2021 13:27) (18 - 18)  SpO2: 99% (27 Dec 2021 13:27) (90% - 99%)    PHYSICAL EXAMINATION:  General: Alert and Awake, NAD  HEENT: PERRL, EOMI  Neck: Supple  Cardiac: RRR, No M/R/G  Resp: CTAB, No Wh/Rh/Ra  Abdomen: NBS, NT/ND, No HSM, No rigidity or guarding  MSK: No LE edema. No Calf tenderness  : No flanagan  Skin: No rashes or lesions. Skin is warm and dry to the touch.   Neuro: Alert and Awake. CN 2-12 Grossly intact. Moves all four extremities spontaneously.  Psych: Calm, Pleasant, Cooperative                          9.5    8.58  )-----------( 256      ( 26 Dec 2021 06:47 )             30.1       12-26    133<L>  |  95<L>  |  19  ----------------------------<  214<H>  4.1   |  25  |  0.66    Ca    9.3      26 Dec 2021 06:46  Phos  3.0     12-26  Mg     2.1     12-26    TPro  6.5  /  Alb  3.1<L>  /  TBili  0.4  /  DBili  x   /  AST  14  /  ALT  16  /  AlkPhos  81  12-26          MICROBIOLOGY:  Vancomycin Level, Trough: 22.8 ug/mL (12-27-21 @ 06:51)  v  .Blood Blood-Venous  12-24-21   No growth to date.  --  --      .Blood Blood-Peripheral  12-24-21   No growth to date.  --  --      .Abscess abdominal abscess  12-23-21   Culture yields >4 types of aerobic and/or anaerobic bacteria  Call client services within 7 days if further workup is clinically  indicated. Culture includes  Few Escherichia coli  Few Morganella morganii  Moderate Enterococcus avium  Numerous Bacteroides thetaiotamcron group "Susceptibilities not performed"  Numerous Clostridium ramosum "Susceptibilities not performed"  --  Escherichia coli  Morganella morganii  Enterococcus avium      .Blood Blood-Peripheral  12-21-21   Growth in aerobic bottle: Staphylococcus epidermidis "Susceptibilities  not performed"  --  Blood Culture PCR      Clean Catch Clean Catch (Midstream)  12-19-21   <10,000 CFU/mL Normal Urogenital Regina  --  --      .Blood Blood  12-19-21   Growth in aerobic and anaerobic bottles: Staphylococcus epidermidis Coag  Negative Staphylococcus  Single set isolate, possible contaminant. Contact  Microbiology if susceptibility testing clinically  indicated.  ***Blood Panel PCR results on this specimen are available  approximately 3 hours after the Gram stain result.***  Gram stain, PCR, and/or culture results may not always  correspond due to difference in methodologies.  ************************************************************  This PCR assay was performed by multiplex PCR. This  Assay tests for 66 bacterial and resistance gene targets.  Please refer to the [facility] test directory  at [email or web address] for details.  --  Blood Culture PCR                RADIOLOGY:    <The imaging below has been reviewed and visualized by me independently. Findings as detailed in report below>    < from: Xray Chest 1 View- PORTABLE-Urgent (Xray Chest 1 View- PORTABLE-Urgent .) (12.25.21 @ 14:21) >  IMPRESSION:  The study limited by low lung volumes.  Difficult to assess heart size on this projection.  Grossly clear right lung.  Small left pleural effusion. There is persistent, lower left lung   haziness - may be due to atelectasis but underlying pneumonia not   excluded in the right clinical setting.  No pneumothorax.  No acute bony finding.    < end of copied text >   Follow Up:  bacteremia, perforated appendicitis     Interval History: afebrile. no acute overnight events.     REVIEW OF SYSTEMS  [  ] ROS unobtainable because:    [ x ] All other systems negative except as noted below    Constitutional:  [ ] fever [ ] chills  [ ] weight loss  [ ] weakness  Skin:  [ ] rash [ ] phlebitis	  Eyes: [ ] icterus [ ] pain  [ ] discharge	  ENMT: [ ] sore throat  [ ] thrush [ ] ulcers [ ] exudates  Respiratory: [ ] dyspnea [ ] hemoptysis [ ] cough [ ] sputum	  Cardiovascular:  [ ] chest pain [ ] palpitations [ ] edema	  Gastrointestinal:  [ ] nausea [ ] vomiting [ ] diarrhea [ ] constipation [x ] pain	  Genitourinary:  [ ] dysuria [ ] frequency [ ] hematuria [ ] discharge [ ] flank pain  [ ] incontinence  Musculoskeletal:  [ ] myalgias [ ] arthralgias [ ] arthritis  [ ] back pain  Neurological:  [ ] headache [ ] seizures  [ ] confusion/altered mental status    Allergies  Toradol (Urticaria (Mild to Mod); Rash (Mild to Mod))        ANTIMICROBIALS:  cefepime   IVPB 1000 every 8 hours  metroNIDAZOLE  IVPB 500 every 12 hours      OTHER MEDS:  MEDICATIONS  (STANDING):  acetaminophen     Tablet .. 650 every 6 hours PRN  albuterol/ipratropium for Nebulization 3 every 6 hours  allopurinol 100 daily  amLODIPine   Tablet 10 daily  atorvastatin 20 at bedtime  dextrose 40% Gel 15 once  dextrose 50% Injectable 25 once  dextrose 50% Injectable 12.5 once  dextrose 50% Injectable 25 once  glucagon  Injectable 1 once  heparin   Injectable 5000 every 8 hours  HYDROmorphone  Injectable 0.25 every 10 minutes PRN  insulin lispro (ADMELOG) corrective regimen sliding scale  every 6 hours  levETIRAcetam  Solution 750 two times a day  levothyroxine 75 daily  melatonin 3 at bedtime PRN  metoprolol tartrate 50 two times a day      Vital Signs Last 24 Hrs  T(C): 36.7 (27 Dec 2021 13:27), Max: 36.8 (26 Dec 2021 20:49)  T(F): 98.1 (27 Dec 2021 13:27), Max: 98.2 (26 Dec 2021 20:49)  HR: 92 (27 Dec 2021 13:27) (84 - 107)  BP: 106/61 (27 Dec 2021 13:27) (106/61 - 132/79)  BP(mean): --  RR: 18 (27 Dec 2021 13:27) (18 - 18)  SpO2: 99% (27 Dec 2021 13:27) (90% - 99%)    PHYSICAL EXAMINATION:  General: Alert and Awake, NAD  Cardiac: RRR, No M/R/G  Resp: CTAB, No Wh/Rh/Ra  Abdomen: +drain in RLQ with bloody appearing drainage, NBS, ND, RLQ tenderness to palpation, No HSM, No rigidity or guarding  MSK: No LE edema. LLE contracted  Skin: No rashes or lesions. Skin is warm and dry to the touch.   Neuro: Alert and Awake. CN 2-12 Grossly intact. Moves all four extremities spontaneously.  Psych: Calm, Pleasant, Cooperative                          9.5    8.58  )-----------( 256      ( 26 Dec 2021 06:47 )             30.1       12-26    133<L>  |  95<L>  |  19  ----------------------------<  214<H>  4.1   |  25  |  0.66    Ca    9.3      26 Dec 2021 06:46  Phos  3.0     12-26  Mg     2.1     12-26    TPro  6.5  /  Alb  3.1<L>  /  TBili  0.4  /  DBili  x   /  AST  14  /  ALT  16  /  AlkPhos  81  12-26          MICROBIOLOGY:  Vancomycin Level, Trough: 22.8 ug/mL (12-27-21 @ 06:51)  v  .Blood Blood-Venous  12-24-21   No growth to date.  --  --      .Blood Blood-Peripheral  12-24-21   No growth to date.  --  --      .Abscess abdominal abscess  12-23-21   Culture yields >4 types of aerobic and/or anaerobic bacteria  Call client services within 7 days if further workup is clinically  indicated. Culture includes  Few Escherichia coli  Few Morganella morganii  Moderate Enterococcus avium  Numerous Bacteroides thetaiotamcron group "Susceptibilities not performed"  Numerous Clostridium ramosum "Susceptibilities not performed"  --  Escherichia coli  Morganella morganii  Enterococcus avium      .Blood Blood-Peripheral  12-21-21   Growth in aerobic bottle: Staphylococcus epidermidis "Susceptibilities  not performed"  --  Blood Culture PCR      Clean Catch Clean Catch (Midstream)  12-19-21   <10,000 CFU/mL Normal Urogenital Regina  --  --      .Blood Blood  12-19-21   Growth in aerobic and anaerobic bottles: Staphylococcus epidermidis Coag  Negative Staphylococcus  Single set isolate, possible contaminant. Contact  Microbiology if susceptibility testing clinically  indicated.  ***Blood Panel PCR results on this specimen are available  approximately 3 hours after the Gram stain result.***  Gram stain, PCR, and/or culture results may not always  correspond due to difference in methodologies.  ************************************************************  This PCR assay was performed by multiplex PCR. This  Assay tests for 66 bacterial and resistance gene targets.  Please refer to the [facility] test directory  at [email or web address] for details.  --  Blood Culture PCR                RADIOLOGY:    <The imaging below has been reviewed and visualized by me independently. Findings as detailed in report below>    < from: Xray Chest 1 View- PORTABLE-Urgent (Xray Chest 1 View- PORTABLE-Urgent .) (12.25.21 @ 14:21) >  IMPRESSION:  The study limited by low lung volumes.  Difficult to assess heart size on this projection.  Grossly clear right lung.  Small left pleural effusion. There is persistent, lower left lung   haziness - may be due to atelectasis but underlying pneumonia not   excluded in the right clinical setting.  No pneumothorax.  No acute bony finding.    < end of copied text >

## 2021-12-27 NOTE — PROGRESS NOTE ADULT - ASSESSMENT
85 yo F w/ PMHx CVA with residual left weakness (~3 years ago), PEG for dysphagia, hypothyroid, COPD (on no home Oxygen), HTN, gout, presenting from [facility] w/ complaints of difficulty breathing and fever, concern for asp PNA vs COPD exacerbation, hosp course c/b perf appendicitis and bacteremia, MIKALA pending to r/o IE.

## 2021-12-27 NOTE — DISCHARGE NOTE PROVIDER - CARE PROVIDER_API CALL
Daksha Schultz  HEMATOLOGY  [address], [address]  [place] [address]  Phone: [phone]  Fax: [phone]  Follow Up Time: 2 weeks    Kennedy Marcano)  Internal Medicine; Pulmonary Disease  [address], [address]  [place] [address]  Phone: [phone]  Fax: [phone]  Follow Up Time:     Heide Ayon  CARDIOVASCULAR DISEASE  [address] [place] [address] [address]  [place] [address]  Phone: [phone]  Fax: [phone]  Follow Up Time:     John Fragoso; MBBS)  Infectious Disease; Internal Medicine  [address] [place] [address]  Phone: [phone]  Fax: [phone]  Follow Up Time:

## 2021-12-27 NOTE — PROGRESS NOTE ADULT - SUBJECTIVE AND OBJECTIVE BOX
REVIEW OF SYSTEMS:  GEN: no fever,    no chills  RESP: no SOB,   no cough  CVS: no chest pain,   no palpitations  GI: no abdominal pain,   no nausea,   no vomiting,   no constipation,   no diarrhea  : no dysuria,   no frequency  NEURO: no headache,   no dizziness  PSYCH: no depression,   not anxious  Derm : no rash    MEDICATIONS  (STANDING):  albuterol/ipratropium for Nebulization 3 milliLiter(s) Nebulizer every 6 hours  allopurinol 100 milliGRAM(s) Oral daily  amLODIPine   Tablet 10 milliGRAM(s) Oral daily  artificial  tears Solution 1 Drop(s) Both EYES two times a day  atorvastatin 20 milliGRAM(s) Oral at bedtime  cefepime   IVPB 1000 milliGRAM(s) IV Intermittent every 8 hours  dextrose 40% Gel 15 Gram(s) Oral once  dextrose 5%. 1000 milliLiter(s) (50 mL/Hr) IV Continuous <Continuous>  dextrose 5%. 1000 milliLiter(s) (100 mL/Hr) IV Continuous <Continuous>  dextrose 50% Injectable 25 Gram(s) IV Push once  dextrose 50% Injectable 12.5 Gram(s) IV Push once  dextrose 50% Injectable 25 Gram(s) IV Push once  glucagon  Injectable 1 milliGRAM(s) IntraMuscular once  heparin   Injectable 5000 Unit(s) SubCutaneous every 8 hours  insulin lispro (ADMELOG) corrective regimen sliding scale   SubCutaneous every 6 hours  levETIRAcetam  Solution 750 milliGRAM(s) Oral two times a day  levothyroxine 75 MICROGram(s) Oral daily  metoprolol tartrate 50 milliGRAM(s) Oral two times a day  afebrilemetroNIDAZOLE  IVPB 500 milliGRAM(s) IV Intermittent every 12 hours    MEDICATIONS  (PRN):  acetaminophen     Tablet .. 650 milliGRAM(s) Oral every 6 hours PRN Temp greater or equal to 38C (100.4F), Mild Pain (1 - 3)  HYDROmorphone  Injectable 0.25 milliGRAM(s) IV Push every 10 minutes PRN Moderate Pain (4 - 6)  melatonin 3 milliGRAM(s) Oral at bedtime PRN Insomnia      Vital Signs Last 24 Hrs  T(C): 36.8 (27 Dec 2021 04:38), Max: 36.8 (26 Dec 2021 20:49)  T(F): 98.2 (27 Dec 2021 04:38), Max: 98.2 (26 Dec 2021 20:49)  HR: 90 (27 Dec 2021 06:15) (86 - 107)  BP: 108/67 (27 Dec 2021 04:38) (108/67 - 132/79)  BP(mean): --  RR: 18 (27 Dec 2021 04:38) (18 - 18)  SpO2: 98% (27 Dec 2021 06:15) (91% - 99%)  CAPILLARY BLOOD GLUCOSE      POCT Blood Glucose.: 187 mg/dL (27 Dec 2021 06:13)  POCT Blood Glucose.: 212 mg/dL (27 Dec 2021 00:07)  POCT Blood Glucose.: 161 mg/dL (26 Dec 2021 17:41)  POCT Blood Glucose.: 142 mg/dL (26 Dec 2021 12:13)    I&O's Summary    26 Dec 2021 07:01  -  27 Dec 2021 07:00  --------------------------------------------------------  IN: 1930 mL / OUT: 1350 mL / NET: 580 mL        PHYSICAL EXAM:  HEAD:  Atraumatic, Normocephalic  NECK: Supple, No   JVD  CHEST/LUNG:   no     rales,     no,    rhonchi  HEART: Regular rate and rhythm;         murmur  ABDOMEN: Soft, Nontender, ;   EXTREMITIES:    no    edema  NEUROLOGY:  alert  abd drain    LABS:                        9.5    8.58  )-----------( 256      ( 26 Dec 2021 06:47 )             30.1     12-26    133<L>  |  95<L>  |  19  ----------------------------<  214<H>  4.1   |  25  |  0.66    Ca    9.3      26 Dec 2021 06:46  Phos  3.0     12-26  Mg     2.1     12-26    TPro  6.5  /  Alb  3.1<L>  /  TBili  0.4  /  DBili  x   /  AST  14  /  ALT  16  /  AlkPhos  81  12-26                            Consultant(s) Notes Reviewed:      Care Discussed with Consultants/Other Providers:

## 2021-12-27 NOTE — DISCHARGE NOTE PROVIDER - HOSPITAL COURSE
87 yo F w/ h/o CVA (~3 years ago, L residual deficits) PEG for dysphagia, COPD (not on home O2), hypothyroid, HTN, gout, breast CA s/p resection, presenting for SOB from [facility]. Pt requiring 4L NC at ED w/ lo grade fever, concern for asp vs CAP PNA vs COPD exacerbation. Pt initially placed on ceftriaxone and azithro. Found to have RLQ abd pain, obtained CTAP IV contrast, w/ findings consistent w/ perf appendicitis, lesion on R acetabulum (cannot r/o malignancy), endometrial hyperplasia, lesion on pancreatic tail. Switched to zosyn, Surg c/s placed for perf appy, no role for surgical option. IR c/s placed, s/p drain on 12/23. Initial Bcx positive for staph epi, thought to be likely contaminent, held vanc initially but subsequent Bcx remained positive for gram pos cocci, vanc started. TTE showing signs concerning for vegetation on noncardiac cusp of aortic valve. ID following, now on vanc, cefepime and flagyl. MIKALA pending. Repeat Bcx NGTD. 85 yo F w/ h/o CVA (~3 years ago, L residual deficits) PEG for dysphagia, COPD (not on home O2), hypothyroid, HTN, gout, breast CA s/p resection, presenting for SOB from [facility]. Pt requiring 4L NC at ED w/ lo grade fever, concern for asp vs CAP PNA vs COPD exacerbation. Pt initially placed on ceftriaxone and azithro. Found to have RLQ abd pain, obtained CTAP IV contrast, w/ findings consistent w/ perf appendicitis, lesion on R acetabulum (cannot r/o malignancy), endometrial hyperplasia, lesion on pancreatic tail. Switched to zosyn, Surg c/s placed for perf appy, no role for surgical option. IR c/s placed, s/p drain on 12/23. Initial Bcx positive for staph epi, thought to be likely contaminent, held vanc initially but subsequent Bcx remained positive for gram pos cocci, vanc started. TTE showing signs concerning for vegetation on noncardiac cusp of aortic valve. ID following, now on vanc, cefepime and flagyl. Repeat Bcx NGTD. MIKALA was cancelled due to high-risk nature of procedure and the fact that medical mgmt would not change as pt is not a surgical candidate. ID revised antibiotics to vancomycin and ertapenem. IR was called again for a drain check and PICC line placement. 86        86   year old female      h/o hemorrhagic CVA, has  PEG,  HTN, MI,      HLD, gout   right Ca breast. s/p mastectomy in 2019,  s/p  sentinel node  localization.  4/4,  were  negative         *  p/w SOB and  acute respiratory distress,  was  on   bipap  in er   with  elevated wbc of 17,000 on arrival,  from pna/  probable aspiration/ gram negative pna  cxr,? pl effusion, right  lung opacit   *   s/p cva, has  PEG,  npo/,  on  synthroid, Keppra  ,  *  HTN, on meds  per  card  prior  echo,  normal ef  *  h/o ca breast. /, s/p  R  mastectomy  *  bacteremia. / staph epi, meth R  ct  c/a/p, ?  pna, perforated  appendicitis,  ?  mets  to  acetabulum   surg/ IR  and  oncology eval dr pacheco    per  surg, no  intervention  *   sepsis  on  arrival, is  resolving  from  perforated  appendix/ and  Staph epi  bacteremia which is  persistent,  hence  cannot  dismiss  it as  a contaminant    and  also, with  echo, vegetation on  aortic  valve/ endocarditis, ?  esdras,  may  not  change   rx  plan,  will need  extended  course  of  ab   / will defer to card    was on iv vanco, . Cefepime   and flagyl   rpt ct  noted,  RLL  phlegmon,  and  80  cc of  pus  drained by  IR .  c/s  with  ecofernando, e coccus  and  sophia  pe r card , pt high risk for esdras  and given that . this will not alter rx. pt  will need   extended  course  of ab   elevated lfts , noted, are normal  picc  line   by IR   done  on iv  vanco and ertapenem/ follow vanco level  peg dislodged.  IR   replacements  on 1/3  switch to oral mds   start  d/c  planning/ with picc  and iv ab  for 6 weeks, per  ID    team awrae  daughter  aware   84        86   year old female      h/o hemorrhagic CVA, has  PEG,  HTN, MI,      HLD, gout   right Ca breast. s/p mastectomy in 2019,  s/p  sentinel node  localization.  4/4,  were  negative         *  p/w SOB and  acute respiratory distress,  was  on   bipap  in er   with  elevated wbc of 17,000 on arrival,  from pna/  probable aspiration/ gram negative pna  cxr,? pl effusion, right  lung opacit   *   s/p cva, has  PEG,  npo/,  on  synthroid, Keppra  ,  *  HTN, on meds  per  card  prior  echo,  normal ef  *  h/o ca breast. /, s/p  R  mastectomy  *  bacteremia. / staph epi, meth R  ct  c/a/p, ?  pna, perforated  appendicitis,  ?  mets  to  acetabulum   surg/ IR  and  oncology eval dr pacheco    per  surg, no  intervention  *   sepsis  on  arrival, is  resolving  from  perforated  appendix/ and  Staph epi  bacteremia which is  persistent,  hence  cannot  dismiss  it as  a contaminant    and  also, with  echo, vegetation on  aortic  valve/ endocarditis, ?  esdras,  may  not  change   rx  plan,  will need  extended  course  of  ab   / will defer to card    was on iv vanco, . Cefepime   and flagyl   rpt ct  noted,  RLL  phlegmon,  and  80  cc of  pus  drained by  IR .  c/s  with  ecoli, e coccus  and  sophia  pe r card , pt high risk for esdras  and given that . this will not alter rx. pt  will need   extended  course  of ab   elevated lfts , noted, are normal    s/p   bronch, for collapsed  left  lung  by  dr mir  picc  line   by IR   done  on iv  vanco and ertapenem/ follow vanco level  peg dislodged.  IR   replacements  on 1/3   start  d/c  planning/ with picc  and iv ab  for 6 weeks, per  ID  dr reagan   84        86   year old female      h/o hemorrhagic CVA, has  PEG,  HTN, MI,      HLD, gout   right Ca breast. s/p mastectomy in 2019,  s/p  sentinel node  localization.  4/4,  were  negative         *  p/w SOB and  acute respiratory distress,  was  on   bipap  in er   with  elevated wbc of 17,000 on arrival,  from pna/  probable aspiration/ gram negative pna  cxr,? pl effusion, right  lung opacit   *   s/p cva, has  PEG,  npo/,  on  synthroid, Keppra  ,  *  HTN, on meds  per  card  prior  echo,  normal ef  *  h/o ca breast. /, s/p  R  mastectomy  *  bacteremia. / staph epi, meth R  ct  c/a/p, ?  pna, perforated  appendicitis,  ?  mets  to  acetabulum   surg/ IR  and  oncology eval dr pacheco    per  surg, no  intervention  Eventually can get bone scan and further imaging of hip as needed as outpatient for acetabular lesion and if looks metastatic use hormonal therapy if feasible.  *   sepsis  on  arrival, is  resolving  from  perforated  appendix/ and  Staph epi  bacteremia which is  persistent,  hence  cannot  dismiss  it as  a contaminant    and  also, with  echo, vegetation on  aortic  valve/ endocarditis, ?  esdras,  may  not  change   rx  plan,  will need  extended  course  of  ab   / will defer to card    was on iv vanco, . Cefepime   and flagyl   rpt ct  noted,  RLL  phlegmon,  and  80  cc of  pus  drained by  IR .  c/s  with  ecoli, e coccus  and  morganell  pe r card , pt high risk for esdras  and given that . this will not alter rx. pt  will need   extended  course  of ab   elevated lfts , noted, are normal    s/p   bronch, for collapsed  left  lung  by  dr mir  picc  line   by IR   done  on iv  vanco and ertapenem/ follow vanco level  peg dislodged.  IR   replacements  on 1/3   start  d/c  planning/ with picc  and iv ab  for 6 weeks, per  ID  dr reagan

## 2021-12-27 NOTE — PROGRESS NOTE ADULT - ASSESSMENT
84 yo F with CVA, PEG, COPD, presenting with fevers, SOB  Leukocytosis, fever  CT concerning for perforated appendicitis, atelectasis, pancreatic lesion  BCX with CoNS--no apparent cardiac device (note L shoulder hardware in place)--3/4  Suspect CoNS is colonizer  Generally stable to improved today    1) Perforated appendicitis  Abscess in setting suspected perforated appendix  s/p IR drainage 12/23 - multiple organisms on culture  --Continue Cefepime 1g IV Q8H and Metronidazole 500 mg IV Q12H  --Follow up on IR drainage cultures    2) Positive BCX, Therapeutic Drug Monitoring  BCx (12/19) with 3/4 Staph epi  BCx (12/21) with 2/4 Staph epi  BCx (12/24) with NGTD  TTE (12/21) with possible vegetation on the left or non-coronary cusp of the aortic valve  --Check Vancomycin level to guide dosing (supratherapeutic this AM) (ordered). Hold Vancomycin for now.   --Continue to monitor renal function and vancomycin levels to avoid nephrotoxicity / ototoxicity secondary to vancomycin  --Ideally (clinical status permitting) MIKALA should be pursued    3) Leukocytosis, Fever  - Trend to normal  - Monitor for alternate sources    I will continue to follow. Please feel free to contact me with any further questions.    Justice Corona M.D.  [facility] Division of Infectious Disease  8AM-5PM: Pager Number [phone]  After Hours (or if no response): Please contact the Infectious Diseases Office at [phone]

## 2021-12-27 NOTE — DISCHARGE NOTE PROVIDER - PROVIDER TOKENS
PROVIDER:[TOKEN:[[id number]],FOLLOWUP:[2 weeks]],PROVIDER:[TOKEN:[[id number]]],PROVIDER:[TOKEN:[[id number]]],PROVIDER:[TOKEN:[[id number]]]

## 2021-12-27 NOTE — PROGRESS NOTE ADULT - PROBLEM SELECTOR PLAN 3
TTE w/ signs of possible vegetation on non-coronary cusp of aortic valve, no murmurs appreciated on exam  - Treat w/ vancomycin 1500mg daily, troughs on 3rd dose  - Repeat cultures from 12/24 neg  - MIKALA pending

## 2021-12-27 NOTE — PROGRESS NOTE ADULT - ASSESSMENT
Interventional Radiology Follow-Up Note    This is a 86y Female s/p RLQ abscess drain placement on 12/23 in Interventional Radiology with Dr. Marti.     S: Patient seen and examined @ bedside. No complaints offered.     Medication:  amLODIPine   Tablet: (12-27)  cefepime   IVPB: (12-27)  furosemide   Injectable: (12-25)  heparin   Injectable: (12-27)  metoprolol tartrate: (12-26)  metoprolol tartrate: (12-27)  metroNIDAZOLE  IVPB: (12-27)  vancomycin  IVPB: (12-26)    Vitals:  T(F): 98.2, Max: 98.2 (20:49)  HR: 90  BP: 108/67  RR: 18  SpO2: 98%    Physical Exam:  General: Nontoxic, in NAD  Abdomen: soft, NTND, no peritoneal signs.  Drain Device: Drain intact attached to ILEANA. Dressing clean, dry, intact.    LABS:                        9.5    8.58  )-----------( 256      ( 26 Dec 2021 06:47 )             30.1   12-26    133<L>  |  95<L>  |  19  ----------------------------<  214<H>  4.1   |  25  |  0.66    Ca    9.3      26 Dec 2021 06:46  Phos  3.0     12-26  Mg     2.1     12-26    TPro  6.5  /  Alb  3.1<L>  /  TBili  0.4  /  DBili  x   /  AST  14  /  ALT  16  /  AlkPhos  81  12-26    Culture:   Culture - Abscess with Gram Stain (12.23.21 @ 18:49)  Culture yields >4 types of aerobic and/or anaerobic bacteria  Call client services within 7 days if further workup is clinically  indicated. Culture includes  Few Escherichia coli  Few Morganella morganii  Moderate Enterococcus avium  Numerous Bacteroides thetaiotamcron group "Susceptibilities not performed"  Numerous Clostridium ramosum "Susceptibilities not performed"    Organism Identification: Escherichia coli  Morganella morganii  Enterococcus avium    Organism: Escherichia coli    Organism: Morganella morganii    Organism: Enterococcus avium    Method Type: TAWANA    Method Type: TAWANA    Method Type: TAAWNA      Assessment/Plan: This is a 86 year old female w/ hx of CVA w/ PEG for dysphagia p/w fever and shortness of breath, found to have RLQ fluid c/w perforated appendicitis now s/p IR drain placement on 12/23 with Dr. Marti.    -continue global management per primary team  -cont ABX  -f/u culture results  -when output is <10cc in 24 hrs consider repeat imaging and possible IR tube evaluation  -monitor h/h; transfuse as needed  -trend vs/labs  -flush drain with 5cc NS daily forward only; DO NOT aspirate  -change dressing q3 days or when dressing is saturated  -regarding outpatient follow up with IR, if the patient is d/c home with drainage catheter they can make an appointment with IR by calling the IR booking office at [phone]; recommend IR follow in 2 weeks for tube evaluation.  -they will benefit from VNS service to help with drainage catheter care; they should continue same drainage catheter care as an outpatient.     Please call IR at extension [phone] with any questions, concerns, or issues regarding above.

## 2021-12-28 NOTE — PROGRESS NOTE ADULT - SUBJECTIVE AND OBJECTIVE BOX
Patient is a 86y old  Female who presents with a chief complaint of fevers/sob (27 Dec 2021 16:26)      INTERVAL HPI/OVERNIGHT EVENTS: no acute events, pt doing well this am, no complaints. requesting to go back to [facility]. questions answered re: MIKALA today.       REVIEW OF SYSTEMS:    CONSTITUTIONAL: No weakness, fevers or chills  EYES/ENT: No visual changes;  No vertigo or throat pain   NECK: No pain or stiffness  RESPIRATORY: No cough, wheezing, hemoptysis; No shortness of breath  CARDIOVASCULAR: No chest pain or palpitations  GASTROINTESTINAL: No abdominal or epigastric pain. No nausea, vomiting, or hematemesis; No diarrhea or constipation. No melena or hematochezia.  GENITOURINARY: No dysuria, frequency or hematuria  NEUROLOGICAL: No numbness or weakness  All other review of systems is negative unless indicated above.    FAMILY HISTORY:  No pertinent family history in first degree relatives      T(C): 36.8 (12-28-21 @ 05:44), Max: 36.8 (12-28-21 @ 05:44)  HR: 91 (12-28-21 @ 05:44) (84 - 92)  BP: 109/61 (12-28-21 @ 05:44) (106/61 - 177/80)  RR: 18 (12-28-21 @ 05:44) (18 - 18)  SpO2: 98% (12-28-21 @ 05:44) (90% - 99%)  Wt(kg): --Vital Signs Last 24 Hrs  T(C): 36.8 (28 Dec 2021 05:44), Max: 36.8 (28 Dec 2021 05:44)  T(F): 98.3 (28 Dec 2021 05:44), Max: 98.3 (28 Dec 2021 05:44)  HR: 91 (28 Dec 2021 05:44) (84 - 92)  BP: 109/61 (28 Dec 2021 05:44) (106/61 - 177/80)  BP(mean): --  RR: 18 (28 Dec 2021 05:44) (18 - 18)  SpO2: 98% (28 Dec 2021 05:44) (90% - 99%)    PHYSICAL EXAM:  GENERAL: NAD, well-groomed, well-developed  HEAD:  Atraumatic, Normocephalic  EYES: EOMI, PERRLA, conjunctiva and sclera clear  ENMT: No tonsillar erythema, exudates, or enlargement; Moist mucous membranes, Good dentition, No lesions  NECK: Supple, No JVD, Normal thyroid  NERVOUS SYSTEM:  Alert & Oriented X2, Good concentration; Motor Strength reduced on L side  CHEST/LUNG: + expiratory wheezing, on 3L NC  HEART: Regular rate and rhythm; + systolic murmur  ABDOMEN: Soft, Nontender, Nondistended; Bowel sounds present  EXTREMITIES:  2+ Peripheral Pulses, No clubbing, cyanosis, or edema  LYMPH: No lymphadenopathy noted  SKIN: No rashes or lesions    Consultant(s) Notes Reviewed:  [x ] YES  [ ] NO  Care Discussed with Consultants/Other Providers [ x] YES  [ ] NO    LABS:                        9.6    7.23  )-----------( 266      ( 28 Dec 2021 07:19 )             30.4             CAPILLARY BLOOD GLUCOSE      POCT Blood Glucose.: 176 mg/dL (28 Dec 2021 06:00)  POCT Blood Glucose.: 170 mg/dL (27 Dec 2021 21:55)  POCT Blood Glucose.: 157 mg/dL (27 Dec 2021 17:28)  POCT Blood Glucose.: 148 mg/dL (27 Dec 2021 12:06)    BLOOD CULTURE  12-24 @ 17:38   No growth to date.  --  --  12-24 @ 15:22   No growth to date.  --  --      RADIOLOGY & ADDITIONAL TESTS:    Imaging Personally Reviewed:  [ ] YES  [ ] NO  acetaminophen     Tablet .. 650 milliGRAM(s) Oral every 6 hours PRN  albuterol/ipratropium for Nebulization 3 milliLiter(s) Nebulizer every 6 hours  allopurinol 100 milliGRAM(s) Oral daily  amLODIPine   Tablet 10 milliGRAM(s) Oral daily  artificial  tears Solution 1 Drop(s) Both EYES two times a day  atorvastatin 20 milliGRAM(s) Oral at bedtime  cefepime   IVPB 1000 milliGRAM(s) IV Intermittent every 8 hours  dextrose 40% Gel 15 Gram(s) Oral once  dextrose 5%. 1000 milliLiter(s) IV Continuous <Continuous>  dextrose 5%. 1000 milliLiter(s) IV Continuous <Continuous>  dextrose 50% Injectable 25 Gram(s) IV Push once  dextrose 50% Injectable 12.5 Gram(s) IV Push once  dextrose 50% Injectable 25 Gram(s) IV Push once  glucagon  Injectable 1 milliGRAM(s) IntraMuscular once  heparin   Injectable 5000 Unit(s) SubCutaneous every 8 hours  HYDROmorphone  Injectable 0.25 milliGRAM(s) IV Push every 10 minutes PRN  insulin lispro (ADMELOG) corrective regimen sliding scale   SubCutaneous every 6 hours  levETIRAcetam  Solution 750 milliGRAM(s) Oral two times a day  levothyroxine 75 MICROGram(s) Oral daily  melatonin 3 milliGRAM(s) Oral at bedtime PRN  metoprolol tartrate 50 milliGRAM(s) Oral two times a day  metroNIDAZOLE  IVPB 500 milliGRAM(s) IV Intermittent every 12 hours      HEALTH ISSUES - PROBLEM Dx:  Perforated appendicitis    Sepsis with acute hypoxic respiratory failure    Dysphagia    CVA (cerebral vascular accident)    Prophylactic measure    COPD exacerbation    Pancreatic lesion    Endometrial hyperplasia, unspecified    Endocarditis

## 2021-12-28 NOTE — PROGRESS NOTE ADULT - SUBJECTIVE AND OBJECTIVE BOX
Follow Up:  bacteremia, perforated appendicitis     Interval History: afebrile. no acute overnight events.     REVIEW OF SYSTEMS  [  ] ROS unobtainable because:    [ x ] All other systems negative except as noted below    Constitutional:  [ ] fever [ ] chills  [ ] weight loss  [ ] weakness  Skin:  [ ] rash [ ] phlebitis	  Eyes: [ ] icterus [ ] pain  [ ] discharge	  ENMT: [ ] sore throat  [ ] thrush [ ] ulcers [ ] exudates  Respiratory: [ ] dyspnea [ ] hemoptysis [ ] cough [ ] sputum	  Cardiovascular:  [ ] chest pain [ ] palpitations [ ] edema	  Gastrointestinal:  [ ] nausea [ ] vomiting [ ] diarrhea [ ] constipation [x ] pain	  Genitourinary:  [ ] dysuria [ ] frequency [ ] hematuria [ ] discharge [ ] flank pain  [ ] incontinence  Musculoskeletal:  [ ] myalgias [ ] arthralgias [ ] arthritis  [ ] back pain  Neurological:  [ ] headache [ ] seizures  [ ] confusion/altered mental status    Allergies  Toradol (Urticaria (Mild to Mod); Rash (Mild to Mod))        ANTIMICROBIALS:  cefepime   IVPB 1000 every 8 hours  metroNIDAZOLE  IVPB 500 every 12 hours  vancomycin  IVPB        OTHER MEDS:  MEDICATIONS  (STANDING):  acetaminophen     Tablet .. 650 every 6 hours PRN  albuterol/ipratropium for Nebulization 3 every 6 hours  allopurinol 100 daily  amLODIPine   Tablet 10 daily  atorvastatin 20 at bedtime  dextrose 40% Gel 15 once  dextrose 50% Injectable 25 once  dextrose 50% Injectable 12.5 once  dextrose 50% Injectable 25 once  glucagon  Injectable 1 once  heparin   Injectable 5000 every 8 hours  HYDROmorphone  Injectable 0.25 every 10 minutes PRN  insulin lispro (ADMELOG) corrective regimen sliding scale  every 6 hours  levETIRAcetam  Solution 750 two times a day  levothyroxine 75 daily  melatonin 3 at bedtime PRN  metoprolol tartrate 50 two times a day      Vital Signs Last 24 Hrs  T(C): 36.7 (28 Dec 2021 13:14), Max: 36.8 (28 Dec 2021 05:44)  T(F): 98.1 (28 Dec 2021 13:14), Max: 98.3 (28 Dec 2021 05:44)  HR: 81 (28 Dec 2021 18:32) (78 - 92)  BP: 112/75 (28 Dec 2021 13:14) (109/61 - 177/80)  BP(mean): --  RR: 18 (28 Dec 2021 13:14) (18 - 18)  SpO2: 91% (28 Dec 2021 18:32) (91% - 99%)    PHYSICAL EXAMINATION:  General: Alert and Awake, NAD  Cardiac: RRR, No M/R/G  Resp: CTAB, No Wh/Rh/Ra  Abdomen: +drain in RLQ with bloody appearing drainage, NBS, ND, RLQ tenderness to palpation, No HSM, No rigidity or guarding  MSK: No LE edema. LLE contracted  Skin: No rashes or lesions. Skin is warm and dry to the touch.   Neuro: Alert and Awake. CN 2-12 Grossly intact. Moves all four extremities spontaneously.  Psych: Calm, Pleasant, Cooperative                          9.6    7.23  )-----------( 266      ( 28 Dec 2021 07:19 )             30.4       12-28    136  |  98  |  21  ----------------------------<  169<H>  4.6   |  23  |  0.64    Ca    9.5      28 Dec 2021 07:18  Phos  3.6     12-28  Mg     2.2     12-28    TPro  6.9  /  Alb  3.3  /  TBili  0.2  /  DBili  x   /  AST  79<H>  /  ALT  74<H>  /  AlkPhos  98  12-28          MICROBIOLOGY:  Vancomycin Level, Random: 14.9 ug/mL (12-28-21 @ 07:42)  Vancomycin Level, Random: 18.2 ug/mL (12-27-21 @ 21:09)  v  .Blood Blood-Venous  12-24-21   No growth to date.  --  --      .Blood Blood-Peripheral  12-24-21   No growth to date.  --  --      .Abscess abdominal abscess  12-23-21   Culture yields >4 types of aerobic and/or anaerobic bacteria  Call client services within 7 days if further workup is clinically  indicated. Culture includes  Few Escherichia coli  Few Morganella morganii  Moderate Enterococcus avium  Numerous Bacteroides thetaiotamcron group "Susceptibilities not performed"  Numerous Clostridium ramosum "Susceptibilities not performed"  --  Escherichia coli  Morganella morganii  Enterococcus avium      .Blood Blood-Peripheral  12-21-21   Growth in aerobic bottle: Staphylococcus epidermidis "Susceptibilities  not performed"  --  Blood Culture PCR      Clean Catch Clean Catch (Midstream)  12-19-21   <10,000 CFU/mL Normal Urogenital Regina  --  --      .Blood Blood  12-19-21   Growth in aerobic and anaerobic bottles: Staphylococcus epidermidis Coag  Negative Staphylococcus  Single set isolate, possible contaminant. Contact  Microbiology if susceptibility testing clinically  indicated.  ***Blood Panel PCR results on this specimen are available  approximately 3 hours after the Gram stain result.***  Gram stain, PCR, and/or culture results may not always  correspond due to difference in methodologies.  ************************************************************  This PCR assay was performed by multiplex PCR. This  Assay tests for 66 bacterial and resistance gene targets.  Please refer to the [facility] test directory  at [email or web address] for details.  --  Blood Culture PCR    RADIOLOGY:    <The imaging below has been reviewed and visualized by me independently. Findings as detailed in report below>    < from: Xray Chest 1 View- PORTABLE-Urgent (Xray Chest 1 View- PORTABLE-Urgent .) (12.25.21 @ 14:21) >  IMPRESSION:  The study limited by low lung volumes.  Difficult to assess heart size on this projection.  Grossly clear right lung.  Small left pleural effusion. There is persistent, lower left lung   haziness - may be due to atelectasis but underlying pneumonia not   excluded in the right clinical setting.  No pneumothorax.  No acute bony finding.    < end of copied text >

## 2021-12-28 NOTE — PROGRESS NOTE ADULT - PROBLEM SELECTOR PLAN 3
TTE w/ signs of possible vegetation on non-coronary cusp of aortic valve, no murmurs appreciated on exam  - Treat w/ vancomycin 1500mg daily, troughs on 3rd dose  - Repeat cultures from 12/24 neg  - MIKALA pending TTE w/ signs of possible vegetation on non-coronary cusp of aortic valve, no murmurs appreciated on exam  - Treat w/ vancomycin 1500mg daily, troughs on 3rd dose  - Repeat cultures from 12/24 neg  - MIKALA canceled by MIKALA dept, stated pt is too high-risk

## 2021-12-28 NOTE — PROGRESS NOTE ADULT - ASSESSMENT
86   year old female      h/o hemorrhagic CVA, has  PEG,  HTN, MI,      HLD, gout   right Ca breast. s/p mastectomy in 2019,  s/p  sentinel node  localization.  4/4,  were  negative         *  p/w SOB and  acute respiratory distress,  was  on   bipap  in er   with  elevated wbc of 17,000 on arrival,  from pna/  probable aspiration/ gram negative pna  cxr,? pl effusion, right  lung opacit   *   s/p cva, has  PEG,  npo/,  on  synthroid, Keppra  ,  *  HTN, on meds  per  card  prior  echo,  normal ef  *  h/o ca breast. /, s/p  R  mastectomy  *  bacteremia. / staph epi, meth R  ct  c/a/p, ?  pna, perforated  appendicitis,  ?  mets  to  acetabulum   surg/ IR  and  oncology eval dr pacheco    appreciate  excellent  care of  house  staff  per  surg, no  intervention  *   sepsis  on  arrival, is  resolving  from  perforated  appendix/ and  Staph epi  bacteremia which is  persistent,  hence  cannot  dismiss  it as  a contaminant    and  also, with  echo, vegetation on  aortic  valve/ endocarditis, ?  esdras,  may  not  change   rx  plan,  will need  extended  course  of  ab   / will defer to card    on iv vanco, . Cefepime   and flagyl   rpt ct  noted,  RLL  phlegmon,  and  80  cc of  pus  drained by  IR .  c/s  with  ecoli, e coccus  and  morganella  rpt ct in 2  weeks/ has abd  drain  has  abd drain/ vanco  level high/  follow random level  awiat duration from ID         rd< from: Xray Chest 1 View- PORTABLE-Urgent (12.19.21 @ 09:20) >  IMPRESSION:  Low lung volumes. New small left lower lung hazy opacity may represent   atelectasis versus pleural effusion. Redemonstration of right upper lung   perihilar opacity largely unchanged prior exam.  --- End of Report --  < end of copied text >

## 2021-12-28 NOTE — CHART NOTE - NSCHARTNOTEFT_GEN_A_CORE
Nutrition Follow Up Note  Patient seen for: nutrition follow-up on 5 [facility] course as per chart: 86y Female with PMH of CVA with residual L weakness (~3 years ago), PEG for dysphagia, hypothyroidism, COPD, HTN, and gout who presented with fever and SOB. Admitted . Chart reviewed, events noted. "Concern for asp PNA vs COPD exacerbation, hosp course c/b perf appendicitis and bacteremia, MIKALA pending to r/o IE."     Source: [] Patient       [x] EMR        [] RN        [] Family at bedside       [] Other:    -If unable to interview patient: [] Trach/Vent/BiPAP  [x] Disoriented/confused/inappropriate to interview    Diet Order:   Diet, NPO with Tube Feed:   Tube Feeding Modality: Gastrostomy  Glucerna 1.2 Naseem (GLUCERNARTH)  Total Volume for 24 Hours (mL): 1530  Continuous  Starting Tube Feed Rate {mL per Hour}: 10  Increase Tube Feed Rate by (mL): 10     Every 6 hours  Until Goal Tube Feed Rate (mL per Hour): 85  Tube Feed Duration (in Hours): 18  Tube Feed Start Time: 08:00 (21)    - Current EN order provides: 1836 kcal, 92 g protein, 1232 ml water; 31 kcal/kg, 1.5 g/kg protein based on upper IBW 60 kg   - Is current order appropriate/adequate? [] Yes  [x]  No:   - Observed tube feeds of Glucerna 1.2 running at 85 ml/hr at bedside    GI: Fecal incontinence noted. Last BM today.   Bowel Regimen? [] Yes   [x] No    Weights:   Daily Weight in k (12-22) - slight weight loss noted, some change likely in setting of fluid shifts  Will continue to monitor and trend weights as able.    Nutritionally Pertinent MEDICATIONS  (STANDING):  allopurinol  amLODIPine   Tablet  atorvastatin  cefepime   IVPB  dextrose 40% Gel  dextrose 5%.  dextrose 5%.  dextrose 50% Injectable  dextrose 50% Injectable  dextrose 50% Injectable  glucagon  Injectable  insulin lispro (ADMELOG) corrective regimen sliding scale  levothyroxine  metoprolol tartrate  metroNIDAZOLE  IVPB  vancomycin  IVPB    Pertinent Labs:  @ 07:18: Na 136, BUN 21, Cr 0.64, <H>, K+ 4.6, Phos 3.6, Mg 2.2, Alk Phos 98, ALT/SGPT 74<H>, AST/SGOT 79<H>, HbA1c --    A1C with Estimated Average Glucose Result: 6.3 % (21 @ 19:34)    Finger Sticks:  POCT Blood Glucose.: 165 mg/dL ( @ 12:03)  POCT Blood Glucose.: 176 mg/dL ( @ 06:00)  POCT Blood Glucose.: 170 mg/dL ( @ 21:55)  POCT Blood Glucose.: 157 mg/dL ( @ 17:28)    Skin per nursing documentation: +wound, no pressure injuries  Edema per flowsheets: 1+ generalized    Estimated Needs:   Based on upper IBW 60 kg   Estimated Energy Needs (30-35 kcal/kg): 7966-7005 kcal/day  Estimated Protein Needs (1.6-1.8 g/kg):  g/day  Defer fluids to team    Previous Nutrition Diagnosis: Inadequate Energy Intake, Overweight/Obesity  Nutrition Diagnosis is: [x] ongoing  [] resolved [] not applicable   Being addressed with provision of nutrients through EN    New Nutrition Diagnosis: not applicable    Nutrition Interventions:     Education Provided:       [] Yes:  [x] No: pt lethargic, on tube feeds -- deferred at this time        Recommendations:      1) Recommend increasing tube feeds of Glucerna 1.2 to 90 ml/hr x 18 hours (in setting of oral synthroid) to better meet estimated nutritional needs  - At new goal rate, regimen provides: 1620 ml total volume, 1944 kcal, 97 g protein, 1304 ml water; 32 kcal/kg, 1.6 g/kg protein based on upper IBW 60 kg  - Meets >100% RDIs  - Defer free water flushes to team     Monitoring and Evaluation: Monitor enteral nutrition provision and tolerance, weight, labs, skin, GI status    RD remains available upon request and will follow up per protocol  [facility] MS RD CDN Pager #[phone]

## 2021-12-28 NOTE — PROGRESS NOTE ADULT - SUBJECTIVE AND OBJECTIVE BOX
afberile    REVIEW OF SYSTEMS:  GEN: no fever,    no chills  RESP: no SOB,   no cough  CVS: no chest pain,   no palpitations  GI: no abdominal pain,   no nausea,   no vomiting,   no constipation,   no diarrhea  : no dysuria,   no frequency  NEURO: no headache,   no dizziness  PSYCH: no depression,   not anxious  Derm : no rash    MEDICATIONS  (STANDING):  albuterol/ipratropium for Nebulization 3 milliLiter(s) Nebulizer every 6 hours  allopurinol 100 milliGRAM(s) Oral daily  amLODIPine   Tablet 10 milliGRAM(s) Oral daily  artificial  tears Solution 1 Drop(s) Both EYES two times a day  atorvastatin 20 milliGRAM(s) Oral at bedtime  cefepime   IVPB 1000 milliGRAM(s) IV Intermittent every 8 hours  dextrose 40% Gel 15 Gram(s) Oral once  dextrose 5%. 1000 milliLiter(s) (50 mL/Hr) IV Continuous <Continuous>  dextrose 5%. 1000 milliLiter(s) (100 mL/Hr) IV Continuous <Continuous>  dextrose 50% Injectable 25 Gram(s) IV Push once  dextrose 50% Injectable 12.5 Gram(s) IV Push once  dextrose 50% Injectable 25 Gram(s) IV Push once  glucagon  Injectable 1 milliGRAM(s) IntraMuscular once  heparin   Injectable 5000 Unit(s) SubCutaneous every 8 hours  insulin lispro (ADMELOG) corrective regimen sliding scale   SubCutaneous every 6 hours  levETIRAcetam  Solution 750 milliGRAM(s) Oral two times a day  levothyroxine 75 MICROGram(s) Oral daily  metoprolol tartrate 50 milliGRAM(s) Oral two times a day  metroNIDAZOLE  IVPB 500 milliGRAM(s) IV Intermittent every 12 hours    MEDICATIONS  (PRN):  acetaminophen     Tablet .. 650 milliGRAM(s) Oral every 6 hours PRN Temp greater or equal to 38C (100.4F), Mild Pain (1 - 3)  HYDROmorphone  Injectable 0.25 milliGRAM(s) IV Push every 10 minutes PRN Moderate Pain (4 - 6)  melatonin 3 milliGRAM(s) Oral at bedtime PRN Insomnia      Vital Signs Last 24 Hrs  T(C): 36.8 (28 Dec 2021 05:44), Max: 36.8 (28 Dec 2021 05:44)  T(F): 98.3 (28 Dec 2021 05:44), Max: 98.3 (28 Dec 2021 05:44)  HR: 91 (28 Dec 2021 05:44) (84 - 92)  BP: 109/61 (28 Dec 2021 05:44) (106/61 - 177/80)  BP(mean): --  RR: 18 (28 Dec 2021 05:44) (18 - 18)  SpO2: 98% (28 Dec 2021 05:44) (90% - 99%)  CAPILLARY BLOOD GLUCOSE      POCT Blood Glucose.: 176 mg/dL (28 Dec 2021 06:00)  POCT Blood Glucose.: 170 mg/dL (27 Dec 2021 21:55)  POCT Blood Glucose.: 157 mg/dL (27 Dec 2021 17:28)  POCT Blood Glucose.: 148 mg/dL (27 Dec 2021 12:06)    I&O's Summary    27 Dec 2021 07:01  -  28 Dec 2021 07:00  --------------------------------------------------------  IN: 1645 mL / OUT: 425 mL / NET: 1220 mL    28 Dec 2021 07:01  -  28 Dec 2021 10:49  --------------------------------------------------------  IN: 270 mL / OUT: 0 mL / NET: 270 mL        PHYSICAL EXAM:  HEAD:  Atraumatic, Normocephalic  NECK: Supple, No   JVD  CHEST/LUNG:   no     rales,     no,    rhonchi  HEART: Regular rate and rhythm;         murmur  ABDOMEN: Soft, Nontender, ;   EXTREMITIES:     no   edema  NEUROLOGY:  alert    LABS:                        9.6    7.23  )-----------( 266      ( 28 Dec 2021 07:19 )             30.4     12-28    136  |  98  |  21  ----------------------------<  169<H>  4.6   |  23  |  0.64    Ca    9.5      28 Dec 2021 07:18  Phos  3.6     12-28  Mg     2.2     12-28    TPro  6.9  /  Alb  3.3  /  TBili  0.2  /  DBili  x   /  AST  79<H>  /  ALT  74<H>  /  AlkPhos  98  12-28                            Consultant(s) Notes Reviewed:      Care Discussed with Consultants/Other Providers:

## 2021-12-28 NOTE — PROGRESS NOTE ADULT - ASSESSMENT
86 yo F with CVA, PEG, COPD, presenting with fevers, SOB  Leukocytosis, fever  CT concerning for perforated appendicitis, atelectasis, pancreatic lesion  BCX with CoNS--no apparent cardiac device (note L shoulder hardware in place)--3/4  Suspect CoNS is colonizer  Generally stable to improved today    1) Perforated appendicitis  Abscess in setting suspected perforated appendix  s/p IR drainage 12/23 - multiple organisms on culture  --Continue Cefepime 1g IV Q8H and Metronidazole 500 mg IV Q12H  --Follow up on IR drainage cultures    2) Positive BCX, Therapeutic Drug Monitoring  BCx (12/19) with 3/4 Staph epi  BCx (12/21) with 2/4 Staph epi  BCx (12/24) with NGTD  TTE (12/21) with possible vegetation on the left or non-coronary cusp of the aortic valve  --Recommend Vancomycin 1.25g IV Q24H. Check trough prior to the third dose. Target trough of ~15  --Continue to monitor renal function and vancomycin levels to avoid nephrotoxicity / ototoxicity secondary to vancomycin  --Discussed with Cardiology; would be high risk for MIKALA. Will treat empirically for IE    3) Leukocytosis, Fever  - Trend to normal    Justice Corona M.D.  [facility] Division of Infectious Disease  8AM-5PM: Pager Number [phone]  After Hours (or if no response): Please contact the Infectious Diseases Office at [phone]

## 2021-12-28 NOTE — PROGRESS NOTE ADULT - SUBJECTIVE AND OBJECTIVE BOX
CARDIOLOGY     PROGRESS  NOTE   ________________________________________________    CHIEF COMPLAINT:Patient is a 86y old  Female who presents with a chief complaint of fevers/sob (28 Dec 2021 08:26)  no complain.  	  REVIEW OF SYSTEMS:  CONSTITUTIONAL: No fever, weight loss, or fatigue  EYES: No eye pain, visual disturbances, or discharge  ENT:  No difficulty hearing, tinnitus, vertigo; No sinus or throat pain  NECK: No pain or stiffness  RESPIRATORY: No cough, wheezing, chills or hemoptysis; No Shortness of Breath  CARDIOVASCULAR: No chest pain, palpitations, passing out, dizziness, or leg swelling  GASTROINTESTINAL: No abdominal or epigastric pain. No nausea, vomiting, or hematemesis; No diarrhea or constipation. No melena or hematochezia.  GENITOURINARY: No dysuria, frequency, hematuria, or incontinence  NEUROLOGICAL: No headaches, memory loss, loss of strength, numbness, or tremors  SKIN: No itching, burning, rashes, or lesions   LYMPH Nodes: No enlarged glands  ENDOCRINE: No heat or cold intolerance; No hair loss  MUSCULOSKELETAL: No joint pain or swelling; No muscle, back, or extremity pain  PSYCHIATRIC: No depression, anxiety, mood swings, or difficulty sleeping  HEME/LYMPH: No easy bruising, or bleeding gums  ALLERGY AND IMMUNOLOGIC: No hives or eczema	    [ ] All others negative	  [ ] Unable to obtain    PHYSICAL EXAM:  T(C): 36.8 (12-28-21 @ 05:44), Max: 36.8 (12-28-21 @ 05:44)  HR: 91 (12-28-21 @ 05:44) (84 - 92)  BP: 109/61 (12-28-21 @ 05:44) (106/61 - 177/80)  RR: 18 (12-28-21 @ 05:44) (18 - 18)  SpO2: 98% (12-28-21 @ 05:44) (90% - 99%)  Wt(kg): --  I&O's Summary    27 Dec 2021 07:01  -  28 Dec 2021 07:00  --------------------------------------------------------  IN: 1645 mL / OUT: 425 mL / NET: 1220 mL        Appearance: Normal	  HEENT:   Normal oral mucosa, PERRL, EOMI	  Lymphatic: No lymphadenopathy  Cardiovascular: Normal S1 S2, No JVD, No murmurs, No edema  Respiratory: Lungs clear to auscultation	  Psychiatry: A & O x 3, Mood & affect appropriate  Gastrointestinal:  Soft, +mild tender, + BS, peg	  Skin: No rashes, No ecchymoses, No cyanosis	  Neurologic: Non-focal  Extremities: Normal range of motion, No clubbing, cyanosis or edema  Vascular: Peripheral pulses palpable 2+ bilaterally    MEDICATIONS  (STANDING):  albuterol/ipratropium for Nebulization 3 milliLiter(s) Nebulizer every 6 hours  allopurinol 100 milliGRAM(s) Oral daily  amLODIPine   Tablet 10 milliGRAM(s) Oral daily  artificial  tears Solution 1 Drop(s) Both EYES two times a day  atorvastatin 20 milliGRAM(s) Oral at bedtime  cefepime   IVPB 1000 milliGRAM(s) IV Intermittent every 8 hours  dextrose 40% Gel 15 Gram(s) Oral once  dextrose 5%. 1000 milliLiter(s) (50 mL/Hr) IV Continuous <Continuous>  dextrose 5%. 1000 milliLiter(s) (100 mL/Hr) IV Continuous <Continuous>  dextrose 50% Injectable 25 Gram(s) IV Push once  dextrose 50% Injectable 12.5 Gram(s) IV Push once  dextrose 50% Injectable 25 Gram(s) IV Push once  glucagon  Injectable 1 milliGRAM(s) IntraMuscular once  heparin   Injectable 5000 Unit(s) SubCutaneous every 8 hours  insulin lispro (ADMELOG) corrective regimen sliding scale   SubCutaneous every 6 hours  levETIRAcetam  Solution 750 milliGRAM(s) Oral two times a day  levothyroxine 75 MICROGram(s) Oral daily  metoprolol tartrate 50 milliGRAM(s) Oral two times a day  metroNIDAZOLE  IVPB 500 milliGRAM(s) IV Intermittent every 12 hours      TELEMETRY: 	    ECG:  	  RADIOLOGY:  OTHER: 	  	  LABS:	 	    CARDIAC MARKERS:                                9.6    7.23  )-----------( 266      ( 28 Dec 2021 07:19 )             30.4     12-28    136  |  98  |  21  ----------------------------<  169<H>  4.6   |  23  |  0.64    Ca    9.5      28 Dec 2021 07:18  Phos  3.6     12-28  Mg     2.2     12-28    TPro  6.9  /  Alb  3.3  /  TBili  0.2  /  DBili  x   /  AST  79<H>  /  ALT  74<H>  /  AlkPhos  98  12-28    proBNP: Serum Pro-Brain Natriuretic Peptide: 375 pg/mL (12-19 @ 08:45)    Lipid Profile:   HgA1c:   TSH:         Assessment and plan  ---------------------------  85 yo F from [facility], CVA with residual left weakness (~3 years ago), PEG for dysphagia, hypothyroid, COPD (on no home Oxygen), HTN, gout, p/w fever, Pt has respiratory distress, wheezing, concerning for respiratory infection, otherwise no other symptoms is reported on the chart. EMS gave solumedrol 125 through peripheral, small iv line.  pt is well known to me with hx of htn, ashd, s/p MI, cva with increasing sob on bipap in er.  can not get any hx from the pt.  sob ?respiratory failure ?sec to pneumoniae  ?pleural effusion, check ct chest noted  continue bp meds  dvt prophylaxis  abx  will consider to possible repeat echo  duoneb  dvt prophylaxis  ct scan/ surgery/ ID noted  continue amlodipine and Metoprolol for now  tachycardia sec to underlying condition, continue to observe  oob to chair as tolerated  IR, continue abx/ surgery noted  incentive spirometry  replete K  abx as per ID  repeat blood cultures, negative  increase metoprolol to 50 mg bid sec to increase bp observe closely  awaiting MIKALA today

## 2021-12-29 NOTE — PROGRESS NOTE ADULT - SUBJECTIVE AND OBJECTIVE BOX
Interventional Radiology Follow-Up Note.     Patient seen and examined @ bedside around 8am.    This is a 86y Female s/p RLQ abscess drain placement on 12/23/21 in Interventional Radiology with Dr. Hernández.     No complaint offered.    Medication:  amLODIPine   Tablet: (12-29)  cefepime   IVPB: (12-29)  heparin   Injectable: (12-29)  metoprolol tartrate: (12-29)  metroNIDAZOLE  IVPB: (12-29)  vancomycin  IVPB: (12-28)    Vitals:   T(F): 98.3, Max: 98.3 (04:50)  HR: 93  BP: 113/70  RR: 18  SpO2: 93%    Physical Exam:  General: Nontoxic, in NAD.  Abdomen: soft, pain on palpation in RLQ around drain site.   Drain Device: Drain intact attached to ILEANA bulb.  Flushed with 5cc NS w/o difficulty. Dressing clean, dry, intact.     24hr Drain output: not recorded but at least 10cc seen in bulb. Confirmed drain is working after flush.      12-28-21 @ 07:01  -  12-29-21 @ 07:00  --------------------------------------------------------  IN: 1040 mL / OUT: 810 mL / NET: 230 mL      Aspartate Aminotransferase (AST/SGOT): 79 U/L (12-28-21 @ 07:18)  Alanine Aminotransferase (ALT/SGPT): 74 U/L (12-28-21 @ 07:18)      LABS:  Na: 136 (12-28 @ 07:18)  K: 4.6 (12-28 @ 07:18)  Cl: 98 (12-28 @ 07:18)  CO2: 23 (12-28 @ 07:18)  BUN: 21 (12-28 @ 07:18)  Cr: 0.64 (12-28 @ 07:18)  Glu: 169(12-28 @ 07:18)    Hgb: 9.6 (12-28 @ 07:19)  Hct: 30.4 (12-28 @ 07:19)  WBC: 7.23 (12-28 @ 07:19)  Plt: 266 (12-28 @ 07:19)    LIVER FUNCTIONS - ( 28 Dec 2021 07:18 )  Alb: 3.3 g/dL / Pro: 6.9 g/dL / ALK PHOS: 98 U/L / ALT: 74 U/L / AST: 79 U/L / GGT: x           Culture Results:   Growth in anaerobic bottle: Staphylococcus epidermidis "Susceptibilities  not performed"  Growth in aerobic bottle: Coryneform Gram Positive Rods "Susceptibilities  not performed"  ***Blood Panel PCR results on this specimen are available  approximately 3 hours after the Gram stain result.***  Gram stain, PCR, and/or culture results may not always  correspond due to difference in methodologies.  ************************************************************  This PCR assay was performed by multiplex PCR. This  Assay tests for 66 bacterial and resistance gene targets.  Please refer to the [facility] Labs test directory  at [email or web address] for details. (12.21.21 @ 17:15)          Assessment/Plan:  This is a 86 year old female w/ hx of CVA w/ PEG for dysphagia p/w fever and shortness of breath, found to have RLQ fluid c/w perforated appendicitis now s/p IR drain placement on 12/23 with Dr. Marti.     - continue antibiotics  - Flush drain as ordered; DO NOT aspirate  - Change dressing q3 days or when dressing is saturated.  -  Monitor h/h; transfuse as needed  - Trend vs/labs  - Continue global management per primary team  - If the patient is d/c home with drainage catheter, pt can make an appointment with IR by calling the IR booking office at [phone]; recommend IR follow in 2 wks or when the out put is less than 10cc/24hrs period, consider repeat imaging and then possible tube evaluation.  - Pt will benefit from VNS service to help with drainage catheter care; Pt should continue same drainage catheter care as an outpatient.   - To be d/w Dr. Hernández    Please call IR at  [phone] with any questions, concerns, or issues regarding above.    Also available on Teams.     Interventional Radiology Follow-Up Note.     Patient seen and examined @ bedside around 8am.    This is a 86y Female s/p RLQ abscess drain placement on 12/23/21 in Interventional Radiology with Dr. Hernández.     No complaint offered.    Medication:  amLODIPine   Tablet: (12-29)  cefepime   IVPB: (12-29)  heparin   Injectable: (12-29)  metoprolol tartrate: (12-29)  metroNIDAZOLE  IVPB: (12-29)  vancomycin  IVPB: (12-28)    Vitals:   T(F): 98.3, Max: 98.3 (04:50)  HR: 93  BP: 113/70  RR: 18  SpO2: 93%    Physical Exam:  General: Nontoxic, in NAD.  Abdomen: soft, pain on palpation in RLQ around drain site.   Drain Device: Drain intact attached to ILEANA bulb.  Flushed with 5cc NS w/o difficulty. Dressing clean, dry, intact.     24hr Drain output: not recorded but at least 10cc seen in bulb, brown fluid. Confirmed drain is working after flush.      12-28-21 @ 07:01  -  12-29-21 @ 07:00  --------------------------------------------------------  IN: 1040 mL / OUT: 810 mL / NET: 230 mL      Aspartate Aminotransferase (AST/SGOT): 79 U/L (12-28-21 @ 07:18)  Alanine Aminotransferase (ALT/SGPT): 74 U/L (12-28-21 @ 07:18)      LABS:  Na: 136 (12-28 @ 07:18)  K: 4.6 (12-28 @ 07:18)  Cl: 98 (12-28 @ 07:18)  CO2: 23 (12-28 @ 07:18)  BUN: 21 (12-28 @ 07:18)  Cr: 0.64 (12-28 @ 07:18)  Glu: 169(12-28 @ 07:18)    Hgb: 9.6 (12-28 @ 07:19)  Hct: 30.4 (12-28 @ 07:19)  WBC: 7.23 (12-28 @ 07:19)  Plt: 266 (12-28 @ 07:19)    LIVER FUNCTIONS - ( 28 Dec 2021 07:18 )  Alb: 3.3 g/dL / Pro: 6.9 g/dL / ALK PHOS: 98 U/L / ALT: 74 U/L / AST: 79 U/L / GGT: x           Culture Results:   Growth in anaerobic bottle: Staphylococcus epidermidis "Susceptibilities  not performed"  Growth in aerobic bottle: Coryneform Gram Positive Rods "Susceptibilities  not performed"  ***Blood Panel PCR results on this specimen are available  approximately 3 hours after the Gram stain result.***  Gram stain, PCR, and/or culture results may not always  correspond due to difference in methodologies.  ************************************************************  This PCR assay was performed by multiplex PCR. This  Assay tests for 66 bacterial and resistance gene targets.  Please refer to the [facility] Labs test directory  at [email or web address] for details. (12.21.21 @ 17:15)          Assessment/Plan:  This is a 86 year old female w/ hx of CVA w/ PEG for dysphagia p/w fever and shortness of breath, found to have RLQ fluid c/w perforated appendicitis now s/p IR drain placement on 12/23 with Dr. Marti.     - continue antibiotics  - Flush drain as ordered; DO NOT aspirate.  - Change dressing q3 days or when dressing is saturated.  -  Monitor h/h; transfuse as needed  - Trend vs/labs  - Continue global management per primary team  - If the patient is d/c home with drainage catheter, pt can make an appointment with IR by calling the IR booking office at [phone]; recommend IR follow in 2 wks or when the out put is less than 10cc/24hrs period, consider repeat imaging and then possible tube evaluation.  - Pt will benefit from VNS service to help with drainage catheter care; Pt should continue same drainage catheter care as an outpatient.   - To be d/w Dr. Hernández    Please call IR at  [phone] with any questions, concerns, or issues regarding above.    Also available on Teams.

## 2021-12-29 NOTE — PROGRESS NOTE ADULT - SUBJECTIVE AND OBJECTIVE BOX
afebrile  REVIEW OF SYSTEMS:  GEN: no fever,    no chills  RESP: no SOB,   no cough  CVS: no chest pain,   no palpitations  GI: no abdominal pain,   no nausea,   no vomiting,   no constipation,   no diarrhea  : no dysuria,   no frequency  NEURO: no headache,   no dizziness  PSYCH: no depression,   not anxious  Derm : no rash    MEDICATIONS  (STANDING):  albuterol/ipratropium for Nebulization 3 milliLiter(s) Nebulizer every 6 hours  allopurinol 100 milliGRAM(s) Oral daily  amLODIPine   Tablet 10 milliGRAM(s) Oral daily  artificial  tears Solution 1 Drop(s) Both EYES two times a day  atorvastatin 20 milliGRAM(s) Oral at bedtime  cefepime   IVPB 1000 milliGRAM(s) IV Intermittent every 8 hours  dextrose 40% Gel 15 Gram(s) Oral once  dextrose 5%. 1000 milliLiter(s) (50 mL/Hr) IV Continuous <Continuous>  dextrose 5%. 1000 milliLiter(s) (100 mL/Hr) IV Continuous <Continuous>  dextrose 50% Injectable 25 Gram(s) IV Push once  dextrose 50% Injectable 12.5 Gram(s) IV Push once  dextrose 50% Injectable 25 Gram(s) IV Push once  glucagon  Injectable 1 milliGRAM(s) IntraMuscular once  heparin   Injectable 5000 Unit(s) SubCutaneous every 8 hours  insulin lispro (ADMELOG) corrective regimen sliding scale   SubCutaneous every 6 hours  levETIRAcetam  Solution 750 milliGRAM(s) Oral two times a day  levothyroxine 75 MICROGram(s) Oral daily  metoprolol tartrate 50 milliGRAM(s) Oral two times a day  metroNIDAZOLE  IVPB 500 milliGRAM(s) IV Intermittent every 12 hours  nystatin Cream 1 Application(s) Topical two times a day  vancomycin  IVPB      vancomycin  IVPB 1250 milliGRAM(s) IV Intermittent every 24 hours    MEDICATIONS  (PRN):  acetaminophen     Tablet .. 650 milliGRAM(s) Oral every 6 hours PRN Temp greater or equal to 38C (100.4F), Mild Pain (1 - 3)  HYDROmorphone  Injectable 0.25 milliGRAM(s) IV Push every 10 minutes PRN Moderate Pain (4 - 6)  melatonin 3 milliGRAM(s) Oral at bedtime PRN Insomnia      Vital Signs Last 24 Hrs  T(C): 36.8 (29 Dec 2021 04:50), Max: 36.8 (29 Dec 2021 04:50)  T(F): 98.3 (29 Dec 2021 04:50), Max: 98.3 (29 Dec 2021 04:50)  HR: 93 (29 Dec 2021 04:50) (78 - 93)  BP: 113/70 (29 Dec 2021 04:50) (110/69 - 113/70)  BP(mean): --  RR: 18 (29 Dec 2021 04:50) (18 - 19)  SpO2: 93% (29 Dec 2021 04:50) (91% - 98%)  CAPILLARY BLOOD GLUCOSE      POCT Blood Glucose.: 176 mg/dL (29 Dec 2021 05:48)  POCT Blood Glucose.: 186 mg/dL (29 Dec 2021 00:55)  POCT Blood Glucose.: 167 mg/dL (28 Dec 2021 17:05)  POCT Blood Glucose.: 165 mg/dL (28 Dec 2021 12:03)    I&O's Summary    28 Dec 2021 07:01  -  29 Dec 2021 07:00  --------------------------------------------------------  IN: 1040 mL / OUT: 810 mL / NET: 230 mL        PHYSICAL EXAM:  HEAD:  Atraumatic, Normocephalic  NECK: Supple, No   JVD  CHEST/LUNG:   no     rales,     no,    rhonchi  HEART: Regular rate and rhythm;         murmur  ABDOMEN: Soft, Nontender, ;   EXTREMITIES:    no    edema  NEUROLOGY:  alert    LABS:                        9.6    7.23  )-----------( 266      ( 28 Dec 2021 07:19 )             30.4     12-28    136  |  98  |  21  ----------------------------<  169<H>  4.6   |  23  |  0.64    Ca    9.5      28 Dec 2021 07:18  Phos  3.6     12-28  Mg     2.2     12-28    TPro  6.9  /  Alb  3.3  /  TBili  0.2  /  DBili  x   /  AST  79<H>  /  ALT  74<H>  /  AlkPhos  98  12-28                            Consultant(s) Notes Reviewed:      Care Discussed with Consultants/Other Providers:

## 2021-12-29 NOTE — PROGRESS NOTE ADULT - PROBLEM SELECTOR PLAN 3
TTE w/ signs of possible vegetation on non-coronary cusp of aortic valve, no murmurs appreciated on exam  - Treat w/ vancomycin 1500mg daily, troughs on 3rd dose  - Repeat cultures from 12/24 neg  - MIKALA canceled by MIKALA dept, stated pt is too high-risk  - Medically manage with IV abx

## 2021-12-29 NOTE — PROGRESS NOTE ADULT - ASSESSMENT
86 yo F with CVA, PEG, COPD, presenting with fevers, SOB  Leukocytosis, fever  CT concerning for perforated appendicitis, atelectasis, pancreatic lesion  BCX with CoNS--no apparent cardiac device (note L shoulder hardware in place)--3/4  Suspect CoNS is colonizer  Generally stable to improved today    1) Perforated appendicitis  Abscess in setting suspected perforated appendix  s/p IR drainage 12/23 - multiple organisms on culture  --Continue Cefepime 1g IV Q8H and Metronidazole 500 mg IV Q12H  --Follow up on IR drainage cultures    2) Positive BCX, Therapeutic Drug Monitoring  BCx (12/19) with 3/4 Staph epi  BCx (12/21) with 2/4 Staph epi  BCx (12/24) with NGTD  TTE (12/21) with possible vegetation on the left or non-coronary cusp of the aortic valve  --Recommend Vancomycin 1.25g IV Q24H. Check trough prior to the third dose. Target trough of ~15  --Continue to monitor renal function and vancomycin levels to avoid nephrotoxicity / ototoxicity secondary to vancomycin  --Discussed with Cardiology; would be high risk for MIKALA. Will treat empirically for IE    3) Leukocytosis, Fever  - Trend to normal    John Fragoso  Attending Physician   Division of Infectious Disease  Pager #[phone]  Available on Microsoft Teams also  After 5pm/weekend or no response, call #[phone]

## 2021-12-29 NOTE — PROGRESS NOTE ADULT - PROBLEM SELECTOR PLAN 7
Incidental finding in CTAP w/ IV contrast, w/ lesion in R acetabulum   - f/u onc recs, possible bone scan  - outpatient f/u

## 2021-12-29 NOTE — PROGRESS NOTE ADULT - ASSESSMENT
85 yo F w/ PMHx CVA with residual left weakness (~3 years ago), PEG for dysphagia, hypothyroid, COPD (on no home Oxygen), HTN, gout, presenting from [facility] w/ complaints of difficulty breathing and fever, concern for asp PNA vs COPD exacerbation, hosp course c/b perf appendicitis and bacteremia, TTE concerning for IE. On IV antibiotics.

## 2021-12-29 NOTE — PROGRESS NOTE ADULT - SUBJECTIVE AND OBJECTIVE BOX
CARDIOLOGY     PROGRESS  NOTE   ________________________________________________    CHIEF COMPLAINT:Patient is a 86y old  Female who presents with a chief complaint of fevers/sob (29 Dec 2021 07:30)  no complain.  	  REVIEW OF SYSTEMS:  CONSTITUTIONAL: No fever, weight loss, or fatigue  EYES: No eye pain, visual disturbances, or discharge  ENT:  No difficulty hearing, tinnitus, vertigo; No sinus or throat pain  NECK: No pain or stiffness  RESPIRATORY: No cough, wheezing, chills or hemoptysis; No Shortness of Breath  CARDIOVASCULAR: No chest pain, palpitations, passing out, dizziness, or leg swelling  GASTROINTESTINAL: No abdominal or epigastric pain. No nausea, vomiting, or hematemesis; No diarrhea or constipation. No melena or hematochezia.  GENITOURINARY: No dysuria, frequency, hematuria, or incontinence  NEUROLOGICAL: No headaches, memory loss, loss of strength, numbness, or tremors  SKIN: No itching, burning, rashes, or lesions   LYMPH Nodes: No enlarged glands  ENDOCRINE: No heat or cold intolerance; No hair loss  MUSCULOSKELETAL: No joint pain or swelling; No muscle, back, or extremity pain  PSYCHIATRIC: No depression, anxiety, mood swings, or difficulty sleeping  HEME/LYMPH: No easy bruising, or bleeding gums  ALLERGY AND IMMUNOLOGIC: No hives or eczema	    [ ] All others negative	  [ ] Unable to obtain    PHYSICAL EXAM:  T(C): 36.8 (12-29-21 @ 04:50), Max: 36.8 (12-29-21 @ 04:50)  HR: 93 (12-29-21 @ 04:50) (78 - 93)  BP: 113/70 (12-29-21 @ 04:50) (110/69 - 113/70)  RR: 18 (12-29-21 @ 04:50) (18 - 19)  SpO2: 93% (12-29-21 @ 04:50) (91% - 98%)  Wt(kg): --  I&O's Summary    28 Dec 2021 07:01  -  29 Dec 2021 07:00  --------------------------------------------------------  IN: 1040 mL / OUT: 810 mL / NET: 230 mL        Appearance: Normal	  HEENT:   Normal oral mucosa, PERRL, EOMI	  Lymphatic: No lymphadenopathy  Cardiovascular: Normal S1 S2, No JVD,+murmurs, No edema  Respiratory: Lungs clear to auscultation	  Psychiatry: A & O x 3, Mood & affect appropriate  Gastrointestinal:  Soft, + mildly tender, + BS, peg	  Skin: No rashes, No ecchymoses, No cyanosis	  Neurologic: Non-focal  Extremities: Normal range of motion, No clubbing, cyanosis or edema  Vascular: Peripheral pulses palpable 2+ bilaterally    MEDICATIONS  (STANDING):  albuterol/ipratropium for Nebulization 3 milliLiter(s) Nebulizer every 6 hours  allopurinol 100 milliGRAM(s) Oral daily  amLODIPine   Tablet 10 milliGRAM(s) Oral daily  artificial  tears Solution 1 Drop(s) Both EYES two times a day  atorvastatin 20 milliGRAM(s) Oral at bedtime  cefepime   IVPB 1000 milliGRAM(s) IV Intermittent every 8 hours  dextrose 40% Gel 15 Gram(s) Oral once  dextrose 5%. 1000 milliLiter(s) (50 mL/Hr) IV Continuous <Continuous>  dextrose 5%. 1000 milliLiter(s) (100 mL/Hr) IV Continuous <Continuous>  dextrose 50% Injectable 25 Gram(s) IV Push once  dextrose 50% Injectable 12.5 Gram(s) IV Push once  dextrose 50% Injectable 25 Gram(s) IV Push once  glucagon  Injectable 1 milliGRAM(s) IntraMuscular once  heparin   Injectable 5000 Unit(s) SubCutaneous every 8 hours  insulin lispro (ADMELOG) corrective regimen sliding scale   SubCutaneous every 6 hours  levETIRAcetam  Solution 750 milliGRAM(s) Oral two times a day  levothyroxine 75 MICROGram(s) Oral daily  metoprolol tartrate 50 milliGRAM(s) Oral two times a day  metroNIDAZOLE  IVPB 500 milliGRAM(s) IV Intermittent every 12 hours  nystatin Cream 1 Application(s) Topical two times a day  vancomycin  IVPB      vancomycin  IVPB 1250 milliGRAM(s) IV Intermittent every 24 hours      TELEMETRY: 	    ECG:  	  RADIOLOGY:  OTHER: 	  	  LABS:	 	    CARDIAC MARKERS:                                9.6    7.23  )-----------( 266      ( 28 Dec 2021 07:19 )             30.4     12-28    136  |  98  |  21  ----------------------------<  169<H>  4.6   |  23  |  0.64    Ca    9.5      28 Dec 2021 07:18  Phos  3.6     12-28  Mg     2.2     12-28    TPro  6.9  /  Alb  3.3  /  TBili  0.2  /  DBili  x   /  AST  79<H>  /  ALT  74<H>  /  AlkPhos  98  12-28    proBNP: Serum Pro-Brain Natriuretic Peptide: 375 pg/mL (12-19 @ 08:45)    Lipid Profile:   HgA1c:   TSH:   1) Perforated appendicitis  Abscess in setting suspected perforated appendix  s/p IR drainage 12/23 - multiple organisms on culture  --Continue Cefepime 1g IV Q8H and Metronidazole 500 mg IV Q12H  --Follow up on IR drainage cultures    2) Positive BCX, Therapeutic Drug Monitoring  BCx (12/19) with 3/4 Staph epi  BCx (12/21) with 2/4 Staph epi  BCx (12/24) with NGTD  TTE (12/21) with possible vegetation on the left or non-coronary cusp of the aortic valve  --Recommend Vancomycin 1.25g IV Q24H. Check trough prior to the third dose. Target trough of ~15  --Continue to monitor renal function and vancomycin levels to avoid nephrotoxicity / ototoxicity secondary to vancomycin  --Discussed with Cardiology; would be high risk for ESDRAS. Will treat empirically for IE    3) Leukocytosis, Fever  - Trend to normal      Assessment and plan  ---------------------------  85 yo F from [facility] rehab, CVA with residual left weakness (~3 years ago), PEG for dysphagia, hypothyroid, COPD (on no home Oxygen), HTN, gout, p/w fever, Pt has respiratory distress, wheezing, concerning for respiratory infection, otherwise no other symptoms is reported on the chart. EMS gave solumedrol 125 through peripheral, small iv line.  pt is well known to me with hx of htn, ashd, s/p MI, cva with increasing sob on bipap in er.  can not get any hx from the pt.  sob ?respiratory failure ?sec to pneumoniae  ?pleural effusion, check ct chest noted  continue bp meds  dvt prophylaxis  abx  will consider to possible repeat echo  duoneb  dvt prophylaxis  ct scan/ surgery/ ID noted  continue amlodipine and Metoprolol for now  tachycardia sec to underlying condition, continue to observe  oob to chair as tolerated  IR, continue abx/ surgery noted  incentive spirometry  replete K  abx as per ID  repeat blood cultures, negative  increase metoprolol to 50 mg bid sec to increase bp observe closely  esdras , high risk sec to sleep apnea and her overall condition, agree with empiric therapy

## 2021-12-29 NOTE — PROGRESS NOTE ADULT - ASSESSMENT
86   year old female      h/o hemorrhagic CVA, has  PEG,  HTN, MI,      HLD, gout   right Ca breast. s/p mastectomy in 2019,  s/p  sentinel node  localization.  4/4,  were  negative         *  p/w SOB and  acute respiratory distress,  was  on   bipap  in er   with  elevated wbc of 17,000 on arrival,  from pna/  probable aspiration/ gram negative pna  cxr,? pl effusion, right  lung opacit   *   s/p cva, has  PEG,  npo/,  on  synthroid, Keppra  ,  *  HTN, on meds  per  card  prior  echo,  normal ef  *  h/o ca breast. /, s/p  R  mastectomy  *  bacteremia. / staph epi, meth R  ct  c/a/p, ?  pna, perforated  appendicitis,  ?  mets  to  acetabulum   surg/ IR  and  oncology eval dr pacheco    appreciate  excellent  care of  house  staff  per  surg, no  intervention  *   sepsis  on  arrival, is  resolving  from  perforated  appendix/ and  Staph epi  bacteremia which is  persistent,  hence  cannot  dismiss  it as  a contaminant    and  also, with  echo, vegetation on  aortic  valve/ endocarditis, ?  esdras,  may  not  change   rx  plan,  will need  extended  course  of  ab   / will defer to card    on iv vanco, . Cefepime   and flagyl   rpt ct  noted,  RLL  phlegmon,  and  80  cc of  pus  drained by  IR .  c/s  with  ecoli, e coccus  and  morganella  has abd  drain.  f/p by IR  on iv cefepime/ iv vanco.  follow trough  pe r card , pt high risk for esdras  and given that . this will not alter rx. pt  will need   extended  course  of ab / picc  line  elevated lfts / abd  u/s.  follow labs in am       rd< from: Xray Chest 1 View- PORTABLE-Urgent (12.19.21 @ 09:20) >  IMPRESSION:  Low lung volumes. New small left lower lung hazy opacity may represent   atelectasis versus pleural effusion. Redemonstration of right upper lung   perihilar opacity largely unchanged prior exam.  --- End of Report --  < end of copied text >

## 2021-12-29 NOTE — PROGRESS NOTE ADULT - SUBJECTIVE AND OBJECTIVE BOX
FRANKI MEHTA 86y [id number]    Patient is a 86y old  Female who presents with a chief complaint of fevers/sob (29 Dec 2021 10:04)      Follow Up/CC:  ID following for bacteremia     Interval History/ROS:    Allergies    Toradol (Urticaria (Mild to Mod); Rash (Mild to Mod))    Intolerances        ANTIMICROBIALS:  cefepime   IVPB 1000 every 8 hours  metroNIDAZOLE  IVPB 500 every 12 hours  vancomycin  IVPB    vancomycin  IVPB 1250 every 24 hours      MEDICATIONS  (STANDING):  albuterol/ipratropium for Nebulization 3 milliLiter(s) Nebulizer every 6 hours  allopurinol 100 milliGRAM(s) Oral daily  amLODIPine   Tablet 10 milliGRAM(s) Oral daily  artificial  tears Solution 1 Drop(s) Both EYES two times a day  atorvastatin 20 milliGRAM(s) Oral at bedtime  cefepime   IVPB 1000 milliGRAM(s) IV Intermittent every 8 hours  dextrose 40% Gel 15 Gram(s) Oral once  dextrose 5%. 1000 milliLiter(s) (100 mL/Hr) IV Continuous <Continuous>  dextrose 5%. 1000 milliLiter(s) (50 mL/Hr) IV Continuous <Continuous>  dextrose 50% Injectable 25 Gram(s) IV Push once  dextrose 50% Injectable 12.5 Gram(s) IV Push once  dextrose 50% Injectable 25 Gram(s) IV Push once  glucagon  Injectable 1 milliGRAM(s) IntraMuscular once  heparin   Injectable 5000 Unit(s) SubCutaneous every 8 hours  insulin lispro (ADMELOG) corrective regimen sliding scale   SubCutaneous every 6 hours  levETIRAcetam  Solution 750 milliGRAM(s) Oral two times a day  levothyroxine 75 MICROGram(s) Oral daily  metoprolol tartrate 50 milliGRAM(s) Oral two times a day  metroNIDAZOLE  IVPB 500 milliGRAM(s) IV Intermittent every 12 hours  nystatin Cream 1 Application(s) Topical two times a day  sodium zirconium cyclosilicate 5 Gram(s) Oral once  vancomycin  IVPB      vancomycin  IVPB 1250 milliGRAM(s) IV Intermittent every 24 hours    MEDICATIONS  (PRN):  acetaminophen     Tablet .. 650 milliGRAM(s) Oral every 6 hours PRN Temp greater or equal to 38C (100.4F), Mild Pain (1 - 3)  HYDROmorphone  Injectable 0.25 milliGRAM(s) IV Push every 10 minutes PRN Moderate Pain (4 - 6)  melatonin 3 milliGRAM(s) Oral at bedtime PRN Insomnia        Vital Signs Last 24 Hrs  T(C): 36.8 (29 Dec 2021 04:50), Max: 36.8 (29 Dec 2021 04:50)  T(F): 98.3 (29 Dec 2021 04:50), Max: 98.3 (29 Dec 2021 04:50)  HR: 93 (29 Dec 2021 04:50) (81 - 93)  BP: 113/70 (29 Dec 2021 04:50) (110/69 - 113/70)  BP(mean): --  RR: 18 (29 Dec 2021 04:50) (18 - 19)  SpO2: 93% (29 Dec 2021 04:50) (91% - 95%)    CBC Full  -  ( 29 Dec 2021 10:47 )  WBC Count : 9.09 K/uL  RBC Count : 3.15 M/uL  Hemoglobin : 10.2 g/dL  Hematocrit : 32.9 %  Platelet Count - Automated : 258 K/uL  Mean Cell Volume : 104.4 fl  Mean Cell Hemoglobin : 32.4 pg  Mean Cell Hemoglobin Concentration : 31.0 gm/dL  Auto Neutrophil # : x  Auto Lymphocyte # : x  Auto Monocyte # : x  Auto Eosinophil # : x  Auto Basophil # : x  Auto Neutrophil % : x  Auto Lymphocyte % : x  Auto Monocyte % : x  Auto Eosinophil % : x  Auto Basophil % : x    12-29    135  |  98  |  18  ----------------------------<  173<H>  5.6<H>   |  21<L>  |  0.56    Ca    9.4      29 Dec 2021 10:47  Phos  3.6     12-28  Mg     2.2     12-28    TPro  6.9  /  Alb  3.3  /  TBili  0.2  /  DBili  x   /  AST  79<H>  /  ALT  74<H>  /  AlkPhos  98  12-28    LIVER FUNCTIONS - ( 28 Dec 2021 07:18 )  Alb: 3.3 g/dL / Pro: 6.9 g/dL / ALK PHOS: 98 U/L / ALT: 74 U/L / AST: 79 U/L / GGT: x               MICROBIOLOGY:  .Blood Blood-Venous  12-24-21   No growth to date.  --  --      .Blood Blood-Peripheral  12-24-21   No growth to date.  --  --      .Abscess abdominal abscess  12-23-21   Culture yields >4 types of aerobic and/or anaerobic bacteria  Call client services within 7 days if further workup is clinically  indicated. Culture includes  Few Escherichia coli  Few Morganella morganii  Moderate Enterococcus avium  Numerous Bacteroides thetaiotamcron group "Susceptibilities not performed"  Numerous Clostridium ramosum "Susceptibilities not performed"  --  Escherichia coli  Morganella morganii  Enterococcus avium      .Blood Blood-Peripheral  12-21-21   Growth in aerobic bottle: Staphylococcus epidermidis "Susceptibilities  not performed"  --  Blood Culture PCR      Clean Catch Clean Catch (Midstream)  12-19-21   <10,000 CFU/mL Normal Urogenital Regina  --  --      .Blood Blood  12-19-21   Growth in aerobic and anaerobic bottles: Staphylococcus epidermidis Coag  Negative Staphylococcus  Single set isolate, possible contaminant. Contact  Microbiology if susceptibility testing clinically  indicated.  ***Blood Panel PCR results on this specimen are available  approximately 3 hours after the Gram stain result.***  Gram stain, PCR, and/or culture results may not always  correspond due to difference in methodologies.  ************************************************************  This PCR assay was performed by multiplex PCR. This  Assay tests for 66 bacterial and resistance gene targets.  Please refer to the [facility] test directory  at [email or web address] for details.  --  Blood Culture PCR              v            RADIOLOGY     FRANKI MEHTA 86y [id number]    Patient is a 86y old  Female who presents with a chief complaint of fevers/sob (29 Dec 2021 10:04)      Follow Up/CC:  ID following for bacteremia     Interval History/ROS: no fever    Allergies    Toradol (Urticaria (Mild to Mod); Rash (Mild to Mod))    Intolerances        ANTIMICROBIALS:  cefepime   IVPB 1000 every 8 hours  metroNIDAZOLE  IVPB 500 every 12 hours  vancomycin  IVPB    vancomycin  IVPB 1250 every 24 hours      MEDICATIONS  (STANDING):  albuterol/ipratropium for Nebulization 3 milliLiter(s) Nebulizer every 6 hours  allopurinol 100 milliGRAM(s) Oral daily  amLODIPine   Tablet 10 milliGRAM(s) Oral daily  artificial  tears Solution 1 Drop(s) Both EYES two times a day  atorvastatin 20 milliGRAM(s) Oral at bedtime  cefepime   IVPB 1000 milliGRAM(s) IV Intermittent every 8 hours  dextrose 40% Gel 15 Gram(s) Oral once  dextrose 5%. 1000 milliLiter(s) (100 mL/Hr) IV Continuous <Continuous>  dextrose 5%. 1000 milliLiter(s) (50 mL/Hr) IV Continuous <Continuous>  dextrose 50% Injectable 25 Gram(s) IV Push once  dextrose 50% Injectable 12.5 Gram(s) IV Push once  dextrose 50% Injectable 25 Gram(s) IV Push once  glucagon  Injectable 1 milliGRAM(s) IntraMuscular once  heparin   Injectable 5000 Unit(s) SubCutaneous every 8 hours  insulin lispro (ADMELOG) corrective regimen sliding scale   SubCutaneous every 6 hours  levETIRAcetam  Solution 750 milliGRAM(s) Oral two times a day  levothyroxine 75 MICROGram(s) Oral daily  metoprolol tartrate 50 milliGRAM(s) Oral two times a day  metroNIDAZOLE  IVPB 500 milliGRAM(s) IV Intermittent every 12 hours  nystatin Cream 1 Application(s) Topical two times a day  sodium zirconium cyclosilicate 5 Gram(s) Oral once  vancomycin  IVPB      vancomycin  IVPB 1250 milliGRAM(s) IV Intermittent every 24 hours    MEDICATIONS  (PRN):  acetaminophen     Tablet .. 650 milliGRAM(s) Oral every 6 hours PRN Temp greater or equal to 38C (100.4F), Mild Pain (1 - 3)  HYDROmorphone  Injectable 0.25 milliGRAM(s) IV Push every 10 minutes PRN Moderate Pain (4 - 6)  melatonin 3 milliGRAM(s) Oral at bedtime PRN Insomnia        Vital Signs Last 24 Hrs  T(C): 36.8 (29 Dec 2021 04:50), Max: 36.8 (29 Dec 2021 04:50)  T(F): 98.3 (29 Dec 2021 04:50), Max: 98.3 (29 Dec 2021 04:50)  HR: 93 (29 Dec 2021 04:50) (81 - 93)  BP: 113/70 (29 Dec 2021 04:50) (110/69 - 113/70)  BP(mean): --  RR: 18 (29 Dec 2021 04:50) (18 - 19)  SpO2: 93% (29 Dec 2021 04:50) (91% - 95%)    CBC Full  -  ( 29 Dec 2021 10:47 )  WBC Count : 9.09 K/uL  RBC Count : 3.15 M/uL  Hemoglobin : 10.2 g/dL  Hematocrit : 32.9 %  Platelet Count - Automated : 258 K/uL  Mean Cell Volume : 104.4 fl  Mean Cell Hemoglobin : 32.4 pg  Mean Cell Hemoglobin Concentration : 31.0 gm/dL  Auto Neutrophil # : x  Auto Lymphocyte # : x  Auto Monocyte # : x  Auto Eosinophil # : x  Auto Basophil # : x  Auto Neutrophil % : x  Auto Lymphocyte % : x  Auto Monocyte % : x  Auto Eosinophil % : x  Auto Basophil % : x    12-29    135  |  98  |  18  ----------------------------<  173<H>  5.6<H>   |  21<L>  |  0.56    Ca    9.4      29 Dec 2021 10:47  Phos  3.6     12-28  Mg     2.2     12-28    TPro  6.9  /  Alb  3.3  /  TBili  0.2  /  DBili  x   /  AST  79<H>  /  ALT  74<H>  /  AlkPhos  98  12-28    LIVER FUNCTIONS - ( 28 Dec 2021 07:18 )  Alb: 3.3 g/dL / Pro: 6.9 g/dL / ALK PHOS: 98 U/L / ALT: 74 U/L / AST: 79 U/L / GGT: x               MICROBIOLOGY:  .Blood Blood-Venous  12-24-21   No growth to date.  --  --      .Blood Blood-Peripheral  12-24-21   No growth to date.  --  --      .Abscess abdominal abscess  12-23-21   Culture yields >4 types of aerobic and/or anaerobic bacteria  Call client services within 7 days if further workup is clinically  indicated. Culture includes  Few Escherichia coli  Few Morganella morganii  Moderate Enterococcus avium  Numerous Bacteroides thetaiotamcron group "Susceptibilities not performed"  Numerous Clostridium ramosum "Susceptibilities not performed"  --  Escherichia coli  Morganella morganii  Enterococcus avium      .Blood Blood-Peripheral  12-21-21   Growth in aerobic bottle: Staphylococcus epidermidis "Susceptibilities  not performed"  --  Blood Culture PCR      Clean Catch Clean Catch (Midstream)  12-19-21   <10,000 CFU/mL Normal Urogenital Regina  --  --      .Blood Blood  12-19-21   Growth in aerobic and anaerobic bottles: Staphylococcus epidermidis Coag  Negative Staphylococcus  Single set isolate, possible contaminant. Contact  Microbiology if susceptibility testing clinically  indicated.  ***Blood Panel PCR results on this specimen are available  approximately 3 hours after the Gram stain result.***  Gram stain, PCR, and/or culture results may not always  correspond due to difference in methodologies.  ************************************************************  This PCR assay was performed by multiplex PCR. This  Assay tests for 66 bacterial and resistance gene targets.  Please refer to the [facility] test directory  at [email or web address] for details.  --  Blood Culture PCR        RADIOLOGY    < from: US Abdomen Complete (US Abdomen Complete .) (12.29.21 @ 14:28) >  Technically limited study, with nonvisualization of the spleen and   limited evaluation of the pancreas and common bile duct.    Cholelithiasis. Normal gallbladder wall thickness.    < end of copied text >

## 2021-12-29 NOTE — PROGRESS NOTE ADULT - SUBJECTIVE AND OBJECTIVE BOX
Patient is a 86y old  Female who presents with a chief complaint of fevers/sob (28 Dec 2021 13:00)      INTERVAL HPI/OVERNIGHT EVENTS: IV infiltrated yesterday evening, RN placed new one. this am, no acute medical concerns/complaints. denying pain, fevers, chills.       REVIEW OF SYSTEMS:    CONSTITUTIONAL: No weakness, fevers or chills  EYES/ENT: No visual changes;  No vertigo or throat pain   NECK: No pain or stiffness  RESPIRATORY: No cough, wheezing, hemoptysis; No shortness of breath  CARDIOVASCULAR: No chest pain or palpitations  GASTROINTESTINAL: No abdominal or epigastric pain. No nausea, vomiting, or hematemesis; No diarrhea or constipation. No melena or hematochezia.  GENITOURINARY: No dysuria, frequency or hematuria  NEUROLOGICAL: No numbness or weakness  All other review of systems is negative unless indicated above.    FAMILY HISTORY:  No pertinent family history in first degree relatives      T(C): 36.8 (12-29-21 @ 04:50), Max: 36.8 (12-29-21 @ 04:50)  HR: 93 (12-29-21 @ 04:50) (78 - 93)  BP: 113/70 (12-29-21 @ 04:50) (110/69 - 113/70)  RR: 18 (12-29-21 @ 04:50) (18 - 19)  SpO2: 93% (12-29-21 @ 04:50) (91% - 98%)  Wt(kg): --Vital Signs Last 24 Hrs  T(C): 36.8 (29 Dec 2021 04:50), Max: 36.8 (29 Dec 2021 04:50)  T(F): 98.3 (29 Dec 2021 04:50), Max: 98.3 (29 Dec 2021 04:50)  HR: 93 (29 Dec 2021 04:50) (78 - 93)  BP: 113/70 (29 Dec 2021 04:50) (110/69 - 113/70)  BP(mean): --  RR: 18 (29 Dec 2021 04:50) (18 - 19)  SpO2: 93% (29 Dec 2021 04:50) (91% - 98%)    PHYSICAL EXAM:  GENERAL: NAD, well-groomed, well-developed  HEAD:  Atraumatic, Normocephalic  EYES: EOMI, PERRLA, conjunctiva and sclera clear  ENMT: No tonsillar erythema, exudates, or enlargement; Moist mucous membranes, Good dentition, No lesions  NECK: Supple, No JVD, Normal thyroid  NERVOUS SYSTEM:  Alert & Oriented X2, Good concentration; Motor Strength reduced on L side  CHEST/LUNG: clear, on 3L NC  HEART: Regular rate and rhythm; + systolic murmur  ABDOMEN: Soft, Nontender, Nondistended; Bowel sounds present  EXTREMITIES:  2+ Peripheral Pulses, No clubbing, cyanosis, or edema  LYMPH: No lymphadenopathy noted  SKIN: No rashes or lesions    Consultant(s) Notes Reviewed:  [x ] YES  [ ] NO  Care Discussed with Consultants/Other Providers [ x] YES  [ ] NO    LABS:      Ca    9.5        28 Dec 2021 07:18        CAPILLARY BLOOD GLUCOSE      POCT Blood Glucose.: 176 mg/dL (29 Dec 2021 05:48)  POCT Blood Glucose.: 186 mg/dL (29 Dec 2021 00:55)  POCT Blood Glucose.: 167 mg/dL (28 Dec 2021 17:05)  POCT Blood Glucose.: 165 mg/dL (28 Dec 2021 12:03)    BLOOD CULTURE      RADIOLOGY & ADDITIONAL TESTS:    Imaging Personally Reviewed:  [ ] YES  [ ] NO  acetaminophen     Tablet .. 650 milliGRAM(s) Oral every 6 hours PRN  albuterol/ipratropium for Nebulization 3 milliLiter(s) Nebulizer every 6 hours  allopurinol 100 milliGRAM(s) Oral daily  amLODIPine   Tablet 10 milliGRAM(s) Oral daily  artificial  tears Solution 1 Drop(s) Both EYES two times a day  atorvastatin 20 milliGRAM(s) Oral at bedtime  cefepime   IVPB 1000 milliGRAM(s) IV Intermittent every 8 hours  dextrose 40% Gel 15 Gram(s) Oral once  dextrose 5%. 1000 milliLiter(s) IV Continuous <Continuous>  dextrose 5%. 1000 milliLiter(s) IV Continuous <Continuous>  dextrose 50% Injectable 25 Gram(s) IV Push once  dextrose 50% Injectable 12.5 Gram(s) IV Push once  dextrose 50% Injectable 25 Gram(s) IV Push once  glucagon  Injectable 1 milliGRAM(s) IntraMuscular once  heparin   Injectable 5000 Unit(s) SubCutaneous every 8 hours  HYDROmorphone  Injectable 0.25 milliGRAM(s) IV Push every 10 minutes PRN  insulin lispro (ADMELOG) corrective regimen sliding scale   SubCutaneous every 6 hours  levETIRAcetam  Solution 750 milliGRAM(s) Oral two times a day  levothyroxine 75 MICROGram(s) Oral daily  melatonin 3 milliGRAM(s) Oral at bedtime PRN  metoprolol tartrate 50 milliGRAM(s) Oral two times a day  metroNIDAZOLE  IVPB 500 milliGRAM(s) IV Intermittent every 12 hours  nystatin Cream 1 Application(s) Topical two times a day  vancomycin  IVPB      vancomycin  IVPB 1250 milliGRAM(s) IV Intermittent every 24 hours      HEALTH ISSUES - PROBLEM Dx:  Perforated appendicitis    Sepsis with acute hypoxic respiratory failure    Dysphagia    CVA (cerebral vascular accident)    Prophylactic measure    COPD exacerbation    Pancreatic lesion    Endometrial hyperplasia, unspecified    Endocarditis

## 2021-12-30 NOTE — PROGRESS NOTE ADULT - ASSESSMENT
87 yo F w/ PMHx CVA with residual left weakness (~3 years ago), PEG for dysphagia, hypothyroid, COPD (on no home Oxygen), HTN, gout, presenting from [facility] w/ complaints of difficulty breathing and fever, concern for asp PNA vs COPD exacerbation, hosp course c/b perf appendicitis and bacteremia, TTE concerning for IE. On IV antibiotics.

## 2021-12-30 NOTE — PROGRESS NOTE ADULT - PROBLEM SELECTOR PLAN 9
GOC: Full Code  diet: Tube feeding  Dispo: [facility]

## 2021-12-30 NOTE — PROGRESS NOTE ADULT - PROBLEM SELECTOR PROBLEM 8
Prophylactic measure
Prophylactic measure
Endometrial hyperplasia, unspecified
Prophylactic measure
Endometrial hyperplasia, unspecified
Prophylactic measure

## 2021-12-30 NOTE — PROGRESS NOTE ADULT - ASSESSMENT
84 yo F with CVA, PEG, COPD, presenting with fevers, SOB  Leukocytosis, fever  CT concerning for perforated appendicitis, atelectasis, pancreatic lesion  BCX with CoNS--no apparent cardiac device (note L shoulder hardware in place)--3/4  Generally stable to improved today    1) Perforated appendicitis  Abscess in setting suspected perforated appendix  s/p IR drainage 12/23 - multiple organisms on culture  --DC Cefepime/metronidazole   --Invanz 1 gm iv q24  --cont vancomycin  --tube check today  --drain per IR       2) Positive BCX, Therapeutic Drug Monitoring  BCx (12/19) with 3/4 Staph epi  BCx (12/21) with 2/4 Staph epi  BCx (12/24) with NGTD  TTE (12/21) with possible vegetation on the left or non-coronary cusp of the aortic valve  --lower Vancomycin 1gm IV Q24H  --Check weekly trough  --cbc, bun/creatinine, vanco trough once weekly while on abx   --Continue to monitor renal function and vancomycin levels to avoid nephrotoxicity / ototoxicity secondary to vancomycin  --Discussed with Cardiology- high risk for MIKALA. Will treat empirically for IE  --surveillance bcx post iv abx   --picc  --total 6 weeks abx - day 7 of 42 of abx     3) Leukocytosis, Fever  - Trend to normal  -better    John Fragoso  Attending Physician   Division of Infectious Disease  Pager #[phone]  Available on Microsoft Teams also  After 5pm/weekend or no response, call #[phone]

## 2021-12-30 NOTE — PROGRESS NOTE ADULT - PROBLEM SELECTOR PLAN 1
CTAP showing perf appendicitis, w/ matching clinical symptoms (RLQ abd pain), otherwise hemodynamically stable, no concern for acute abdomen  - No role for surgical intervention  - s/p IR drainage  - monitor serial abd exam  - abx w/ vanc, cefepime, flagyl per ID
CTAP showing perf appendicitis, w/ matching clinical symptoms (RLQ abd pain), otherwise hemodynamically stable, no concern for acute abdomen  - No role for surgical intervention  - s/p IR drainage  - monitor serial abd exam  - abx w/ vanc, cefepime, flagyl f/u repeat cultures
CTAP showing perf appendicitis, w/ matching clinical symptoms (RLQ abd pain), otherwise hemodynamically stable, no concern for acute abdomen  - No role for surgical intervention  - Will get repeat imaging on Wednesday, w/ CTAP IV con, Possible drainage by IR  - Ir for drainage today  - monitor serial abd exam  - abx w/ vanc, cefepime, flagyl f/u repeat cultures
CTAP showing perf appendicitis, w/ matching clinical symptoms (RLQ abd pain), otherwise hemodynamically stable, no concern for acute abdomen  - No role for surgical intervention  - s/p IR drainage  - monitor serial abd exam  - abx w/ vanc, cefepime, flagyl f/u repeat cultures
CTAP showing perf appendicitis, w/ matching clinical symptoms (RLQ abd pain), otherwise hemodynamically stable, no concern for acute abdomen  - No role for surgical intervention  - Will get repeat imaging on Wednesday, w/ CTAP IV con, Possible drainage by IR  - NPO @ midnight on Wednesday  - monitor serial abd exam  - abx w/ zosyn, f/u repeat cultures
CTAP showing perf appendicitis, w/ matching clinical symptoms (RLQ abd pain), otherwise hemodynamically stable, no concern for acute abdomen  - No role for surgical intervention  - s/p IR drainage, drain in place  - repeat CT A/P pending per IR  - abx w/ vanc, cefepime, flagyl per ID
CTAP showing perf appendicitis, w/ matching clinical symptoms (RLQ abd pain), otherwise hemodynamically stable, no concern for acute abdomen  - No role for surgical intervention  - Will get repeat imaging on Wednesday, w/ CTAP IV con, Possible drainage by IR  - Ir for drainage today  - monitor serial abd exam  - abx w/ zosyn, f/u repeat cultures
CTAP showing perf appendicitis, w/ matching clinical symptoms (RLQ abd pain), otherwise hemodynamically stable, no concern for acute abdomen  - No role for surgical intervention  - s/p IR drainage  - monitor serial abd exam  - abx w/ vanc, cefepime, flagyl per ID
CTAP showing perf appendicitis, w/ matching clinical symptoms (RLQ abd pain), otherwise hemodynamically stable, no concern for acute abdomen  - No role for surgical intervention  - s/p IR drainage  - monitor serial abd exam  - abx w/ vanc, cefepime, flagyl per ID

## 2021-12-30 NOTE — CHART NOTE - NSCHARTNOTEFT_GEN_A_CORE
Will plan for PICC placement and drain evaluation today after CT is complete. Please place IR procedure order under DANILO Garcia.

## 2021-12-30 NOTE — PROGRESS NOTE ADULT - PROBLEM SELECTOR PROBLEM 3
Endocarditis
COPD exacerbation
COPD exacerbation
Endocarditis
COPD exacerbation
COPD exacerbation
Endocarditis

## 2021-12-30 NOTE — PROGRESS NOTE ADULT - SUBJECTIVE AND OBJECTIVE BOX
CARDIOLOGY     PROGRESS  NOTE   ________________________________________________    CHIEF COMPLAINT:Patient is a 86y old  Female who presents with a chief complaint of fevers/sob (30 Dec 2021 08:30)  no complain, doing better.  	  REVIEW OF SYSTEMS:  CONSTITUTIONAL: No fever, weight loss, or fatigue  EYES: No eye pain, visual disturbances, or discharge  ENT:  No difficulty hearing, tinnitus, vertigo; No sinus or throat pain  NECK: No pain or stiffness  RESPIRATORY: No cough, wheezing, chills or hemoptysis; No Shortness of Breath  CARDIOVASCULAR: No chest pain, palpitations, passing out, dizziness, or leg swelling  GASTROINTESTINAL: No abdominal or epigastric pain. No nausea, vomiting, or hematemesis; No diarrhea or constipation. No melena or hematochezia.  GENITOURINARY: No dysuria, frequency, hematuria, or incontinence  NEUROLOGICAL: No headaches, memory loss, loss of strength, numbness, or tremors  SKIN: No itching, burning, rashes, or lesions   LYMPH Nodes: No enlarged glands  ENDOCRINE: No heat or cold intolerance; No hair loss  MUSCULOSKELETAL: No joint pain or swelling; No muscle, back, or extremity pain  PSYCHIATRIC: No depression, anxiety, mood swings, or difficulty sleeping  HEME/LYMPH: No easy bruising, or bleeding gums  ALLERGY AND IMMUNOLOGIC: No hives or eczema	    [ ] All others negative	  [ ] Unable to obtain    PHYSICAL EXAM:  T(C): 36.4 (12-30-21 @ 05:03), Max: 36.8 (12-29-21 @ 13:27)  HR: 82 (12-30-21 @ 06:36) (82 - 99)  BP: 123/71 (12-30-21 @ 05:03) (111/70 - 123/71)  RR: 20 (12-30-21 @ 05:03) (18 - 20)  SpO2: 96% (12-30-21 @ 06:36) (90% - 96%)  Wt(kg): --  I&O's Summary    29 Dec 2021 07:01  -  30 Dec 2021 07:00  --------------------------------------------------------  IN: 900 mL / OUT: 800 mL / NET: 100 mL        Appearance: Normal	  HEENT:   Normal oral mucosa, PERRL, EOMI	  Lymphatic: No lymphadenopathy  Cardiovascular: Normal S1 S2, No JVD, + murmurs, No edema  Respiratory: Lungs clear to auscultation	  Psychiatry: A & O x 3, Mood & affect appropriate  Gastrointestinal:  Soft, Non-tender, + BS	  Skin: No rashes, No ecchymoses, No cyanosis	  Neurologic: Non-focal  Extremities: Normal range of motion, No clubbing, cyanosis or edema  Vascular: Peripheral pulses palpable 2+ bilaterally    MEDICATIONS  (STANDING):  allopurinol 100 milliGRAM(s) Oral daily  amLODIPine   Tablet 10 milliGRAM(s) Oral daily  artificial  tears Solution 1 Drop(s) Both EYES two times a day  atorvastatin 20 milliGRAM(s) Oral at bedtime  cefepime   IVPB 1000 milliGRAM(s) IV Intermittent every 8 hours  dextrose 40% Gel 15 Gram(s) Oral once  dextrose 5%. 1000 milliLiter(s) (50 mL/Hr) IV Continuous <Continuous>  dextrose 5%. 1000 milliLiter(s) (100 mL/Hr) IV Continuous <Continuous>  dextrose 50% Injectable 25 Gram(s) IV Push once  dextrose 50% Injectable 25 Gram(s) IV Push once  dextrose 50% Injectable 12.5 Gram(s) IV Push once  glucagon  Injectable 1 milliGRAM(s) IntraMuscular once  heparin   Injectable 5000 Unit(s) SubCutaneous every 8 hours  insulin lispro (ADMELOG) corrective regimen sliding scale   SubCutaneous every 6 hours  levETIRAcetam  Solution 750 milliGRAM(s) Oral two times a day  levothyroxine 75 MICROGram(s) Oral daily  metoprolol tartrate 50 milliGRAM(s) Oral two times a day  metroNIDAZOLE  IVPB 500 milliGRAM(s) IV Intermittent every 12 hours  nystatin Cream 1 Application(s) Topical two times a day  vancomycin  IVPB      vancomycin  IVPB 1250 milliGRAM(s) IV Intermittent every 24 hours      TELEMETRY: 	    ECG:  	  RADIOLOGY:  OTHER: 	  	  LABS:	 	    CARDIAC MARKERS:                                9.6    8.61  )-----------( 229      ( 30 Dec 2021 06:49 )             31.9     12-30    132<L>  |  98  |  19  ----------------------------<  171<H>  4.6   |  22  |  0.66    Ca    9.4      30 Dec 2021 06:45    TPro  6.7  /  Alb  3.3  /  TBili  0.3  /  DBili  <0.1  /  AST  27  /  ALT  51<H>  /  AlkPhos  99  12-30    proBNP: Serum Pro-Brain Natriuretic Peptide: 375 pg/mL (12-19 @ 08:45)    Lipid Profile:   HgA1c:   TSH:   PT/INR - ( 29 Dec 2021 10:47 )   PT: 12.4 sec;   INR: 1.04 ratio               Assessment and plan  ---------------------------  87 yo F from [facility], CVA with residual left weakness (~3 years ago), PEG for dysphagia, hypothyroid, COPD (on no home Oxygen), HTN, gout, p/w fever, Pt has respiratory distress, wheezing, concerning for respiratory infection, otherwise no other symptoms is reported on the chart. EMS gave solumedrol 125 through peripheral, small iv line.  pt is well known to me with hx of htn, ashd, s/p MI, cva with increasing sob on bipap in er.  can not get any hx from the pt.  sob ?respiratory failure ?sec to pneumoniae  ?pleural effusion, check ct chest noted  continue bp meds  dvt prophylaxis  abx  will consider to possible repeat echo  duoneb  dvt prophylaxis  ct scan/ surgery/ ID noted  continue amlodipine and Metoprolol for now  tachycardia sec to underlying condition, continue to observe  oob to chair as tolerated  IR, continue abx/ surgery noted  incentive spirometry  replete K  abx as per ID  repeat blood cultures, negative  increase metoprolol to 50 mg bid sec to increase bp observe closely  esdras , high risk sec to sleep apnea and her overall condition, agree with empiric therapy  will re[peat TTE in 6 weeeks

## 2021-12-30 NOTE — PROGRESS NOTE ADULT - PROBLEM SELECTOR PROBLEM 6
Pancreatic lesion
CVA (cerebral vascular accident)
CVA (cerebral vascular accident)
Pancreatic lesion
CVA (cerebral vascular accident)
CVA (cerebral vascular accident)
Pancreatic lesion
Pancreatic lesion
CVA (cerebral vascular accident)

## 2021-12-30 NOTE — PROGRESS NOTE ADULT - SUBJECTIVE AND OBJECTIVE BOX
Interventional Radiology Follow-Up Note.     Patient seen and examined @ bedside around 7am.      This is a 86y Female s/p RLQ abscess drain placement on 12/23/21 in Interventional Radiology with Dr. Hernández.   NO complaints offered.     Medication:     amLODIPine   Tablet: (12-30)  cefepime   IVPB: (12-30)  heparin   Injectable: (12-30)  metoprolol tartrate: (12-30)  metroNIDAZOLE  IVPB: (12-30)  vancomycin  IVPB: (12-28)  vancomycin  IVPB: (12-29)    Vitals:   T(F): 97.6, Max: 98.2 (13:27)  HR: 82  BP: 123/71  RR: 20  SpO2: 96%    Physical Exam:  General: Nontoxic, in NAD.  Abdomen: soft, ttp at the RLQ.   Drain Device: Drain intact attached to ILEANA bulb. Draining brown fluid.  Flushed with 5cc NS w/o difficulty. Dressing clean, dry, intact.     24hr Drain output: 0cc.       LABS:  Na: 132 (12-30 @ 06:45), 135 (12-29 @ 10:47), 136 (12-28 @ 07:18)  K: 4.6 (12-30 @ 06:45), 5.6 (12-29 @ 10:47), 4.6 (12-28 @ 07:18)  Cl: 98 (12-30 @ 06:45), 98 (12-29 @ 10:47), 98 (12-28 @ 07:18)  CO2: 22 (12-30 @ 06:45), 21 (12-29 @ 10:47), 23 (12-28 @ 07:18)  BUN: 19 (12-30 @ 06:45), 18 (12-29 @ 10:47), 21 (12-28 @ 07:18)  Cr: 0.66 (12-30 @ 06:45), 0.56 (12-29 @ 10:47), 0.64 (12-28 @ 07:18)  Glu: 171(12-30 @ 06:45), 173(12-29 @ 10:47), 169(12-28 @ 07:18)    Hgb: 9.6 (12-30 @ 06:49), 10.2 (12-29 @ 10:47), 9.6 (12-28 @ 07:19)  Hct: 31.9 (12-30 @ 06:49), 32.9 (12-29 @ 10:47), 30.4 (12-28 @ 07:19)  WBC: 8.61 (12-30 @ 06:49), 9.09 (12-29 @ 10:47), 7.23 (12-28 @ 07:19)  Plt: 229 (12-30 @ 06:49), 258 (12-29 @ 10:47), 266 (12-28 @ 07:19)    INR: 1.04 12-29-21 @ 10:47  PTT:           LIVER FUNCTIONS - ( 30 Dec 2021 06:45 )  Alb: 3.3 g/dL / Pro: 6.7 g/dL / ALK PHOS: 99 U/L / ALT: 51 U/L / AST: 27 U/L / GGT: x           Culture - Abscess with Gram Stain (12.23.21 @ 18:49)    -  Amikacin: S <=16    -  Amikacin: S <=16    -  Amoxicillin/Clavulanic Acid: R <=8/4    -  Amoxicillin/Clavulanic Acid: S <=8/4    -  Ampicillin: R >16 These ampicillin results predict results for amoxicillin    -  Ampicillin: R <=8 These ampicillin results predict results for amoxicillin    -  Ampicillin: S <=2 Predicts results to ampicillin/sulbactam, amoxacillin-clavulanate and  piperacillin-tazobactam.    -  Ampicillin/Sulbactam: S <=4/2 Enterobacter, Klebsiella aerogenes, Citrobacter, and Serratia may develop resistance during prolonged therapy (3-4 days)    -  Ampicillin/Sulbactam: I 16/8 Enterobacter, Klebsiella aerogenes, Citrobacter, and Serratia may develop resistance during prolonged therapy (3-4 days)    -  Aztreonam: S <=4    -  Aztreonam: S <=4    -  Cefazolin: R 4 Enterobacter, Klebsiella aerogenes, Citrobacter, and Serratia may develop resistance during prolonged therapy (3-4 days)    -  Cefazolin: I 4 Enterobacter, Klebsiella aerogenes, Citrobacter, and Serratia may develop resistance during prolonged therapy (3-4 days)    -  Cefepime: S <=2    -  Cefepime: S <=2    -  Cefoxitin: S <=8    -  Cefoxitin: S <=8    -  Ceftriaxone: S <=1 Enterobacter, Klebsiella aerogenes, Citrobacter, and Serratia may develop resistance during prolonged therapy    -  Ceftriaxone: S <=1 Enterobacter, Klebsiella aerogenes, Citrobacter, and Serratia may develop resistance during prolonged therapy    -  Ciprofloxacin: S <=0.25    -  Ciprofloxacin: R >2    -  Ertapenem: S <=0.5    -  Ertapenem: S <=0.5    -  Gentamicin: S <=2    -  Gentamicin: S <=2    -  Imipenem: S <=1    -  Imipenem: S <=1    -  Levofloxacin: S <=0.5    -  Levofloxacin: R >4    -  Meropenem: S <=1    -  Meropenem: S <=1    -  Piperacillin/Tazobactam: S <=8    -  Piperacillin/Tazobactam: S <=8    -  Tetra/Doxy: R >8    -  Tobramycin: S <=2    -  Tobramycin: S <=2    -  Trimethoprim/Sulfamethoxazole: R >2/38    -  Trimethoprim/Sulfamethoxazole: S 1/19    -  Vancomycin: S 0.5    Specimen Source: .Abscess abdominal abscess    Culture Results:   Culture yields >4 types of aerobic and/or anaerobic bacteria  Call client services within 7 days if further workup is clinically  indicated. Culture includes  Few Escherichia coli  Few Morganella morganii  Moderate Enterococcus avium  Numerous Bacteroides thetaiotamcron group "Susceptibilities not performed"  Numerous Clostridium ramosum "Susceptibilities not performed"    Organism Identification: Escherichia coli  Morganella morganii  Enterococcus avium    Organism: Escherichia coli    Organism: Morganella morganii    Organism: Enterococcus avium    Method Type: TAWANA    Method Type: TAWANA    Method Type: TAWANA             Assessment/Plan:  This is a 86 year old female w/ hx of CVA w/ PEG for dysphagia p/w fever and shortness of breath, found to have RLQ fluid c/w perforated appendicitis now s/p IR drain placement on 12/23 with Dr. Marti.     - Drain with minimal out put. Recommend CT abdomen and pelvis without contrast.    - Culture :  Escherichia coli, Morganella morganii, Enterococcus avium.  - Vanc, cefepime and metronidazole.   - Flush drain as ordered; DO NOT aspirate  - Change dressing q3 days or when dressing is saturated.  -  Monitor h/h; transfuse as needed  - Trend vs/labs  - Continue global management per primary team.  - TBD Dr. Hernández.        Please call IR at  [phone] with any questions, concerns, or issues regarding above.    Also available on Teams.

## 2021-12-30 NOTE — PROGRESS NOTE ADULT - SUBJECTIVE AND OBJECTIVE BOX
afebrile  REVIEW OF SYSTEMS:  GEN: no fever,    no chills  RESP: no SOB,   no cough  CVS: no chest pain,   no palpitations  GI: no abdominal pain,   no nausea,   no vomiting,   no constipation,   no diarrhea  : no dysuria,   no frequency  NEURO: no headache,   no dizziness  PSYCH: no depression,   not anxious  Derm : no rash    MEDICATIONS  (STANDING):  albuterol/ipratropium for Nebulization 3 milliLiter(s) Nebulizer every 6 hours  allopurinol 100 milliGRAM(s) Oral daily  amLODIPine   Tablet 10 milliGRAM(s) Oral daily  artificial  tears Solution 1 Drop(s) Both EYES two times a day  atorvastatin 20 milliGRAM(s) Oral at bedtime  cefepime   IVPB 1000 milliGRAM(s) IV Intermittent every 8 hours  dextrose 40% Gel 15 Gram(s) Oral once  dextrose 5%. 1000 milliLiter(s) (50 mL/Hr) IV Continuous <Continuous>  dextrose 5%. 1000 milliLiter(s) (100 mL/Hr) IV Continuous <Continuous>  dextrose 50% Injectable 25 Gram(s) IV Push once  dextrose 50% Injectable 25 Gram(s) IV Push once  dextrose 50% Injectable 12.5 Gram(s) IV Push once  glucagon  Injectable 1 milliGRAM(s) IntraMuscular once  heparin   Injectable 5000 Unit(s) SubCutaneous every 8 hours  insulin lispro (ADMELOG) corrective regimen sliding scale   SubCutaneous every 6 hours  levETIRAcetam  Solution 750 milliGRAM(s) Oral two times a day  levothyroxine 75 MICROGram(s) Oral daily  metoprolol tartrate 50 milliGRAM(s) Oral two times a day  metroNIDAZOLE  IVPB 500 milliGRAM(s) IV Intermittent every 12 hours  nystatin Cream 1 Application(s) Topical two times a day  vancomycin  IVPB      vancomycin  IVPB 1250 milliGRAM(s) IV Intermittent every 24 hours    MEDICATIONS  (PRN):  acetaminophen     Tablet .. 650 milliGRAM(s) Oral every 6 hours PRN Temp greater or equal to 38C (100.4F), Mild Pain (1 - 3)  HYDROmorphone  Injectable 0.25 milliGRAM(s) IV Push every 10 minutes PRN Moderate Pain (4 - 6)  melatonin 3 milliGRAM(s) Oral at bedtime PRN Insomnia      Vital Signs Last 24 Hrs  T(C): 36.4 (30 Dec 2021 05:03), Max: 36.8 (29 Dec 2021 13:27)  T(F): 97.6 (30 Dec 2021 05:03), Max: 98.2 (29 Dec 2021 13:27)  HR: 82 (30 Dec 2021 06:36) (82 - 99)  BP: 123/71 (30 Dec 2021 05:03) (111/70 - 123/71)  BP(mean): --  RR: 20 (30 Dec 2021 05:03) (18 - 20)  SpO2: 96% (30 Dec 2021 06:36) (90% - 96%)  CAPILLARY BLOOD GLUCOSE      POCT Blood Glucose.: 175 mg/dL (30 Dec 2021 06:14)  POCT Blood Glucose.: 170 mg/dL (29 Dec 2021 23:42)  POCT Blood Glucose.: 140 mg/dL (29 Dec 2021 17:11)  POCT Blood Glucose.: 169 mg/dL (29 Dec 2021 12:13)    I&O's Summary    29 Dec 2021 07:01  -  30 Dec 2021 07:00  --------------------------------------------------------  IN: 900 mL / OUT: 800 mL / NET: 100 mL        PHYSICAL EXAM:  HEAD:  Atraumatic, Normocephalic  NECK: Supple, No   JVD  CHEST/LUNG:   no     rales,     no,    rhonchi  HEART: Regular rate and rhythm;         murmur  ABDOMEN: Soft, Nontender, ;   EXTREMITIES:    no    edema  NEUROLOGY:  alert    LABS:                        9.6    8.61  )-----------( 229      ( 30 Dec 2021 06:49 )             31.9     12-30    132<L>  |  98  |  19  ----------------------------<  171<H>  4.6   |  22  |  0.66    Ca    9.4      30 Dec 2021 06:45    TPro  6.7  /  Alb  3.3  /  TBili  0.3  /  DBili  <0.1  /  AST  27  /  ALT  51<H>  /  AlkPhos  99  12-30    PT/INR - ( 29 Dec 2021 10:47 )   PT: 12.4 sec;   INR: 1.04 ratio                                 Consultant(s) Notes Reviewed:      Care Discussed with Consultants/Other Providers:

## 2021-12-30 NOTE — PROGRESS NOTE ADULT - SUBJECTIVE AND OBJECTIVE BOX
FRANKI MEHTA 86y [id number]    Patient is a 86y old  Female who presents with a chief complaint of fevers/sob (30 Dec 2021 09:47)      Follow Up/CC:  ID following for bacteremia    Interval History/ROS: no fever, for tube check     Allergies    Toradol (Urticaria (Mild to Mod); Rash (Mild to Mod))    Intolerances        ANTIMICROBIALS:  cefepime   IVPB 1000 every 8 hours  metroNIDAZOLE  IVPB 500 every 12 hours  vancomycin  IVPB    vancomycin  IVPB 1250 every 24 hours      MEDICATIONS  (STANDING):  allopurinol 100 milliGRAM(s) Oral daily  amLODIPine   Tablet 10 milliGRAM(s) Oral daily  artificial  tears Solution 1 Drop(s) Both EYES two times a day  atorvastatin 20 milliGRAM(s) Oral at bedtime  cefepime   IVPB 1000 milliGRAM(s) IV Intermittent every 8 hours  dextrose 40% Gel 15 Gram(s) Oral once  dextrose 5%. 1000 milliLiter(s) (50 mL/Hr) IV Continuous <Continuous>  dextrose 5%. 1000 milliLiter(s) (100 mL/Hr) IV Continuous <Continuous>  dextrose 50% Injectable 12.5 Gram(s) IV Push once  dextrose 50% Injectable 25 Gram(s) IV Push once  dextrose 50% Injectable 25 Gram(s) IV Push once  glucagon  Injectable 1 milliGRAM(s) IntraMuscular once  heparin   Injectable 5000 Unit(s) SubCutaneous every 8 hours  insulin lispro (ADMELOG) corrective regimen sliding scale   SubCutaneous every 6 hours  levETIRAcetam  Solution 750 milliGRAM(s) Oral two times a day  levothyroxine 75 MICROGram(s) Oral daily  metoprolol tartrate 50 milliGRAM(s) Oral two times a day  metroNIDAZOLE  IVPB 500 milliGRAM(s) IV Intermittent every 12 hours  nystatin Cream 1 Application(s) Topical two times a day  vancomycin  IVPB      vancomycin  IVPB 1250 milliGRAM(s) IV Intermittent every 24 hours    MEDICATIONS  (PRN):  acetaminophen     Tablet .. 650 milliGRAM(s) Oral every 6 hours PRN Temp greater or equal to 38C (100.4F), Mild Pain (1 - 3)  melatonin 3 milliGRAM(s) Oral at bedtime PRN Insomnia        Vital Signs Last 24 Hrs  T(C): 36.4 (30 Dec 2021 05:03), Max: 36.7 (29 Dec 2021 20:44)  T(F): 97.6 (30 Dec 2021 05:03), Max: 98.1 (29 Dec 2021 20:44)  HR: 82 (30 Dec 2021 06:36) (82 - 99)  BP: 123/71 (30 Dec 2021 05:03) (111/70 - 123/71)  BP(mean): --  RR: 20 (30 Dec 2021 05:03) (18 - 20)  SpO2: 96% (30 Dec 2021 06:36) (93% - 96%)    CBC Full  -  ( 30 Dec 2021 06:49 )  WBC Count : 8.61 K/uL  RBC Count : 3.05 M/uL  Hemoglobin : 9.6 g/dL  Hematocrit : 31.9 %  Platelet Count - Automated : 229 K/uL  Mean Cell Volume : 104.6 fl  Mean Cell Hemoglobin : 31.5 pg  Mean Cell Hemoglobin Concentration : 30.1 gm/dL  Auto Neutrophil # : x  Auto Lymphocyte # : x  Auto Monocyte # : x  Auto Eosinophil # : x  Auto Basophil # : x  Auto Neutrophil % : x  Auto Lymphocyte % : x  Auto Monocyte % : x  Auto Eosinophil % : x  Auto Basophil % : x    12-30    132<L>  |  98  |  19  ----------------------------<  171<H>  4.6   |  22  |  0.66    Ca    9.4      30 Dec 2021 06:45    TPro  6.7  /  Alb  3.3  /  TBili  0.3  /  DBili  <0.1  /  AST  27  /  ALT  51<H>  /  AlkPhos  99  12-30    LIVER FUNCTIONS - ( 30 Dec 2021 06:45 )  Alb: 3.3 g/dL / Pro: 6.7 g/dL / ALK PHOS: 99 U/L / ALT: 51 U/L / AST: 27 U/L / GGT: x               MICROBIOLOGY:  .Blood Blood-Venous  12-24-21   No Growth Final  --  --      .Blood Blood-Peripheral  12-24-21   No Growth Final  --  --      .Abscess abdominal abscess  12-23-21   Culture yields >4 types of aerobic and/or anaerobic bacteria  Call client services within 7 days if further workup is clinically  indicated. Culture includes  Few Escherichia coli  Few Morganella morganii  Moderate Enterococcus avium  Numerous Bacteroides thetaiotamcron group "Susceptibilities not performed"  Numerous Clostridium ramosum "Susceptibilities not performed"  --  Escherichia coli  Morganella morganii  Enterococcus avium      .Blood Blood-Peripheral  12-21-21   Growth in aerobic bottle: Staphylococcus epidermidis "Susceptibilities  not performed"  --  Blood Culture PCR      Clean Catch Clean Catch (Midstream)  12-19-21   <10,000 CFU/mL Normal Urogenital Regina  --  --      .Blood Blood  12-19-21   Growth in aerobic and anaerobic bottles: Staphylococcus epidermidis Coag  Negative Staphylococcus  Single set isolate, possible contaminant. Contact  Microbiology if susceptibility testing clinically  indicated.  ***Blood Panel PCR results on this specimen are available  approximately 3 hours after the Gram stain result.***  Gram stain, PCR, and/or culture results may not always  correspond due to difference in methodologies.  ************************************************************  This PCR assay was performed by multiplex PCR. This  Assay tests for 66 bacterial and resistance gene targets.  Please refer to the [facility] test directory  at [email or web address] for details.  --  Blood Culture PCR      Vancomycin Level, Trough: 21.0 ug/mL (12-30-21 @ 10:56)      RADIOLOGY    < from: US Abdomen Complete (US Abdomen Complete .) (12.29.21 @ 14:28) >  Technically limited study, with nonvisualization of the spleen and   limited evaluation of the pancreas and common bile duct.    Cholelithiasis. Normal gallbladder wall thickness.      < end of copied text >

## 2021-12-30 NOTE — PROGRESS NOTE ADULT - SUBJECTIVE AND OBJECTIVE BOX
Patient is a 86y old  Female who presents with a chief complaint of fevers/sob (29 Dec 2021 13:26)      INTERVAL HPI/OVERNIGHT EVENTS: adrian overnight, no complaints this am.       REVIEW OF SYSTEMS:    CONSTITUTIONAL: No weakness, fevers or chills  EYES/ENT: No visual changes;  No vertigo or throat pain   NECK: No pain or stiffness  RESPIRATORY: No cough, wheezing, hemoptysis; No shortness of breath  CARDIOVASCULAR: No chest pain or palpitations  GASTROINTESTINAL: No abdominal or epigastric pain. No nausea, vomiting, or hematemesis; No diarrhea or constipation. No melena or hematochezia.  GENITOURINARY: No dysuria, frequency or hematuria  NEUROLOGICAL: No numbness or weakness  All other review of systems is negative unless indicated above.    FAMILY HISTORY:  No pertinent family history in first degree relatives      T(C): 36.4 (12-30-21 @ 05:03), Max: 36.8 (12-29-21 @ 13:27)  HR: 82 (12-30-21 @ 06:36) (82 - 99)  BP: 123/71 (12-30-21 @ 05:03) (111/70 - 123/71)  RR: 20 (12-30-21 @ 05:03) (18 - 20)  SpO2: 96% (12-30-21 @ 06:36) (90% - 96%)  Wt(kg): --Vital Signs Last 24 Hrs  T(C): 36.4 (30 Dec 2021 05:03), Max: 36.8 (29 Dec 2021 13:27)  T(F): 97.6 (30 Dec 2021 05:03), Max: 98.2 (29 Dec 2021 13:27)  HR: 82 (30 Dec 2021 06:36) (82 - 99)  BP: 123/71 (30 Dec 2021 05:03) (111/70 - 123/71)  BP(mean): --  RR: 20 (30 Dec 2021 05:03) (18 - 20)  SpO2: 96% (30 Dec 2021 06:36) (90% - 96%)    PHYSICAL EXAM:  GENERAL: NAD, well-groomed, well-developed  HEAD:  Atraumatic, Normocephalic  EYES: EOMI, PERRLA, conjunctiva and sclera clear  ENMT: No tonsillar erythema, exudates, or enlargement; Moist mucous membranes, Good dentition, No lesions  NECK: Supple, No JVD, Normal thyroid  NERVOUS SYSTEM:  Alert & Oriented X2, Good concentration; Motor Strength reduced on L side  CHEST/LUNG: clear, on 3L NC  HEART: Regular rate and rhythm; + systolic murmur  ABDOMEN: Soft, Nontender, Nondistended; Bowel sounds present  EXTREMITIES:  2+ Peripheral Pulses, No clubbing, cyanosis, or edema  LYMPH: No lymphadenopathy noted  SKIN: No rashes or lesions      Consultant(s) Notes Reviewed:  [x ] YES  [ ] NO  Care Discussed with Consultants/Other Providers [ x] YES  [ ] NO    LABS:                        9.6    8.61  )-----------( 229      ( 30 Dec 2021 06:49 )             31.9     30 Dec 2021 06:45    132    |  98     |  19     ----------------------------<  171    4.6     |  22     |  0.66     Ca    9.4        30 Dec 2021 06:45    TPro  6.7    /  Alb  3.3    /  TBili  0.3    /  DBili  <0.1   /  AST  27     /  ALT  51     /  AlkPhos  99     30 Dec 2021 06:45    PT/INR - ( 29 Dec 2021 10:47 )   PT: 12.4 sec;   INR: 1.04 ratio           CAPILLARY BLOOD GLUCOSE      POCT Blood Glucose.: 175 mg/dL (30 Dec 2021 06:14)  POCT Blood Glucose.: 170 mg/dL (29 Dec 2021 23:42)  POCT Blood Glucose.: 140 mg/dL (29 Dec 2021 17:11)  POCT Blood Glucose.: 169 mg/dL (29 Dec 2021 12:13)    BLOOD CULTURE      RADIOLOGY & ADDITIONAL TESTS:    Imaging Personally Reviewed:  [ ] YES  [ ] NO  acetaminophen     Tablet .. 650 milliGRAM(s) Oral every 6 hours PRN  albuterol/ipratropium for Nebulization 3 milliLiter(s) Nebulizer every 6 hours  allopurinol 100 milliGRAM(s) Oral daily  amLODIPine   Tablet 10 milliGRAM(s) Oral daily  artificial  tears Solution 1 Drop(s) Both EYES two times a day  atorvastatin 20 milliGRAM(s) Oral at bedtime  cefepime   IVPB 1000 milliGRAM(s) IV Intermittent every 8 hours  dextrose 40% Gel 15 Gram(s) Oral once  dextrose 5%. 1000 milliLiter(s) IV Continuous <Continuous>  dextrose 5%. 1000 milliLiter(s) IV Continuous <Continuous>  dextrose 50% Injectable 25 Gram(s) IV Push once  dextrose 50% Injectable 25 Gram(s) IV Push once  dextrose 50% Injectable 12.5 Gram(s) IV Push once  glucagon  Injectable 1 milliGRAM(s) IntraMuscular once  heparin   Injectable 5000 Unit(s) SubCutaneous every 8 hours  HYDROmorphone  Injectable 0.25 milliGRAM(s) IV Push every 10 minutes PRN  insulin lispro (ADMELOG) corrective regimen sliding scale   SubCutaneous every 6 hours  levETIRAcetam  Solution 750 milliGRAM(s) Oral two times a day  levothyroxine 75 MICROGram(s) Oral daily  melatonin 3 milliGRAM(s) Oral at bedtime PRN  metoprolol tartrate 50 milliGRAM(s) Oral two times a day  metroNIDAZOLE  IVPB 500 milliGRAM(s) IV Intermittent every 12 hours  nystatin Cream 1 Application(s) Topical two times a day  vancomycin  IVPB      vancomycin  IVPB 1250 milliGRAM(s) IV Intermittent every 24 hours      HEALTH ISSUES - PROBLEM Dx:  Perforated appendicitis    Sepsis with acute hypoxic respiratory failure    Dysphagia    CVA (cerebral vascular accident)    Prophylactic measure    COPD exacerbation    Pancreatic lesion    Endometrial hyperplasia, unspecified    Endocarditis

## 2021-12-30 NOTE — PROGRESS NOTE ADULT - PROBLEM SELECTOR PLAN 4
H/o COPD, no home O2, now presenting w/ hypoxia, concern for COPD exacerbation  - prednisone stopped 2/2 hyperglycemia  - c/w cefepime, vanc and flagyl  - airway clearance and chest PT  - duonebs q6h

## 2021-12-30 NOTE — PROGRESS NOTE ADULT - PROBLEM SELECTOR PLAN 8
GOC: Full Code  diet: Tube feeding  Dispo: [facility]
Incidental finding in CTAP w/ IV contrast  - f/u onc recs  - possible bone scan
GOC: Full Code  diet: Tube feeding  Dispo: [facility]
Incidental finding in CTAP w/ IV contrast  - f/u onc recs  - possible bone scan
Incidental finding in CTAP w/ IV contrast  - f/u onc recs  - possible bone scan  - outpatient f/u
Incidental finding in CTAP w/ IV contrast  - f/u onc recs  - possible bone scan  - outpatient f/u
GOC: Full Code  diet: Tube feeding  Dispo: [facility]
GOC: Full Code  diet: Tube feeding  Dispo: [facility]
Incidental finding in CTAP w/ IV contrast  - f/u onc recs  - possible bone scan

## 2021-12-30 NOTE — PROGRESS NOTE ADULT - PROBLEM SELECTOR PROBLEM 1
Perforated appendicitis

## 2021-12-30 NOTE — PROGRESS NOTE ADULT - ASSESSMENT
86   year old female      h/o hemorrhagic CVA, has  PEG,  HTN, MI,      HLD, gout   right Ca breast. s/p mastectomy in 2019,  s/p  sentinel node  localization.  4/4,  were  negative         *  p/w SOB and  acute respiratory distress,  was  on   bipap  in er   with  elevated wbc of 17,000 on arrival,  from pna/  probable aspiration/ gram negative pna  cxr,? pl effusion, right  lung opacit   *   s/p cva, has  PEG,  npo/,  on  synthroid, Keppra  ,  *  HTN, on meds  per  card  prior  echo,  normal ef  *  h/o ca breast. /, s/p  R  mastectomy  *  bacteremia. / staph epi, meth R  ct  c/a/p, ?  pna, perforated  appendicitis,  ?  mets  to  acetabulum   surg/ IR  and  oncology eval dr pacheco    appreciate  excellent  care of  house  staff  per  surg, no  intervention  *   sepsis  on  arrival, is  resolving  from  perforated  appendix/ and  Staph epi  bacteremia which is  persistent,  hence  cannot  dismiss  it as  a contaminant    and  also, with  echo, vegetation on  aortic  valve/ endocarditis, ?  esdras,  may  not  change   rx  plan,  will need  extended  course  of  ab   / will defer to card    on iv vanco, . Cefepime   and flagyl   rpt ct  noted,  RLL  phlegmon,  and  80  cc of  pus  drained by  IR .  c/s  with  ecoli, e coccus  and  morganella  has abd  drain.  f/p by IR  on iv cefepime/ iv vanco.  follow trough  pe r card , pt high risk for esdras  and given that . this will not alter rx. pt  will need   extended  course  of ab   elevated lfts , noted, are normal today   picc  line    on iv cefepime/  flagyl and iv vanco/ follow  vanco trough       rd< from: Xray Chest 1 View- PORTABLE-Urgent (12.19.21 @ 09:20) >  IMPRESSION:  Low lung volumes. New small left lower lung hazy opacity may represent   atelectasis versus pleural effusion. Redemonstration of right upper lung   perihilar opacity largely unchanged prior exam.  --- End of Report --  < end of copied text >

## 2021-12-30 NOTE — PROGRESS NOTE ADULT - PROBLEM SELECTOR PROBLEM 2
Sepsis with acute hypoxic respiratory failure

## 2021-12-30 NOTE — PROGRESS NOTE ADULT - PROBLEM SELECTOR PROBLEM 7
Pancreatic lesion
Endometrial hyperplasia, unspecified
Pancreatic lesion

## 2021-12-30 NOTE — PROGRESS NOTE ADULT - PROBLEM SELECTOR PLAN 2
Concern for fever w/ hypoxia, sob w/ CT chest and CXR findings concerning for PNA, asp vs CAP, likely cause of hypoxia  - BiPAP if worsening, stable VBG off BiPAP  - urine legionella neg  - Bcx growing gram pos cocci, coag neg, likely contaminant as per ID, consistent on repeat  - c/w vancomycin, cefepime and flagyl  - TTE w/ signs of possible vegetation on non-coronary cusp of aortic valve  - repeat cultures from 12/24 neg  - wean off NC as tolerated

## 2021-12-30 NOTE — PROGRESS NOTE ADULT - PROBLEM SELECTOR PROBLEM 4
Dysphagia
COPD exacerbation
Dysphagia
COPD exacerbation
COPD exacerbation
Dysphagia
Dysphagia
COPD exacerbation
COPD exacerbation

## 2021-12-30 NOTE — PROGRESS NOTE ADULT - PROBLEM SELECTOR PLAN 3
TTE w/ signs of possible vegetation on non-coronary cusp of aortic valve, no murmurs appreciated on exam  - Treat w/ vancomycin 1500mg daily, troughs on 3rd dose  - Repeat cultures from 12/24 neg  - [facility] canceled by [facility] dept, stated pt is too high-risk  - Medically manage with IV abx per ID

## 2021-12-31 NOTE — PROGRESS NOTE ADULT - SUBJECTIVE AND OBJECTIVE BOX
CARDIOLOGY     PROGRESS  NOTE   ________________________________________________    CHIEF COMPLAINT:Patient is a 86y old  Female who presents with a chief complaint of fevers/sob (31 Dec 2021 08:50)  no complain.  	  REVIEW OF SYSTEMS:  CONSTITUTIONAL: No fever, weight loss, or fatigue  EYES: No eye pain, visual disturbances, or discharge  ENT:  No difficulty hearing, tinnitus, vertigo; No sinus or throat pain  NECK: No pain or stiffness  RESPIRATORY: No cough, wheezing, chills or hemoptysis; No Shortness of Breath  CARDIOVASCULAR: No chest pain, palpitations, passing out, dizziness, or leg swelling  GASTROINTESTINAL: No abdominal or epigastric pain. No nausea, vomiting, or hematemesis; No diarrhea or constipation. No melena or hematochezia.  GENITOURINARY: No dysuria, frequency, hematuria, or incontinence  NEUROLOGICAL: No headaches, memory loss, loss of strength, numbness, or tremors  SKIN: No itching, burning, rashes, or lesions   LYMPH Nodes: No enlarged glands  ENDOCRINE: No heat or cold intolerance; No hair loss  MUSCULOSKELETAL: No joint pain or swelling; No muscle, back, or extremity pain  PSYCHIATRIC: No depression, anxiety, mood swings, or difficulty sleeping  HEME/LYMPH: No easy bruising, or bleeding gums  ALLERGY AND IMMUNOLOGIC: No hives or eczema	    [ ] All others negative	  [x ] Unable to obtain    PHYSICAL EXAM:  T(C): 36.6 (12-31-21 @ 08:30), Max: 37 (12-31-21 @ 04:38)  HR: 83 (12-31-21 @ 08:30) (83 - 111)  BP: 93/48 (12-31-21 @ 08:30) (93/48 - 128/74)  RR: 19 (12-31-21 @ 08:30) (18 - 22)  SpO2: 91% (12-31-21 @ 08:30) (89% - 97%)  Wt(kg): --  I&O's Summary    30 Dec 2021 07:01  -  31 Dec 2021 07:00  --------------------------------------------------------  IN: 0 mL / OUT: 200 mL / NET: -200 mL        Appearance: Normal	  HEENT:   Normal oral mucosa, PERRL, EOMI	  Lymphatic: No lymphadenopathy  Cardiovascular: Normal S1 S2, No JVD, + murmurs, No edema  Respiratory: rhonchi  Psychiatry: A & O x 3, Mood & affect appropriate  Gastrointestinal:  Soft, Non-tender, + BS	  Skin: No rashes, No ecchymoses, No cyanosis	  Neurologic: Non-focal  Extremities: Normal range of motion, No clubbing, cyanosis or edema  Vascular: Peripheral pulses palpable 2+ bilaterally    MEDICATIONS  (STANDING):  allopurinol 100 milliGRAM(s) Oral daily  amLODIPine   Tablet 10 milliGRAM(s) Oral daily  artificial  tears Solution 1 Drop(s) Both EYES two times a day  atorvastatin 20 milliGRAM(s) Oral at bedtime  chlorhexidine 4% Liquid 1 Application(s) Topical <User Schedule>  dextrose 40% Gel 15 Gram(s) Oral once  dextrose 5%. 1000 milliLiter(s) (50 mL/Hr) IV Continuous <Continuous>  dextrose 5%. 1000 milliLiter(s) (100 mL/Hr) IV Continuous <Continuous>  dextrose 50% Injectable 25 Gram(s) IV Push once  dextrose 50% Injectable 25 Gram(s) IV Push once  dextrose 50% Injectable 12.5 Gram(s) IV Push once  ertapenem  IVPB 1000 milliGRAM(s) IV Intermittent every 24 hours  glucagon  Injectable 1 milliGRAM(s) IntraMuscular once  heparin   Injectable 5000 Unit(s) SubCutaneous every 8 hours  insulin lispro (ADMELOG) corrective regimen sliding scale   SubCutaneous every 6 hours  levETIRAcetam  Solution 750 milliGRAM(s) Oral two times a day  levothyroxine 75 MICROGram(s) Oral daily  metoprolol tartrate 50 milliGRAM(s) Oral two times a day  nystatin Cream 1 Application(s) Topical two times a day  vancomycin  IVPB 1000 milliGRAM(s) IV Intermittent every 24 hours      TELEMETRY: 	    ECG:  	  RADIOLOGY:  OTHER: 	  	  LABS:	 	    CARDIAC MARKERS:                                9.6    8.61  )-----------( 229      ( 30 Dec 2021 06:49 )             31.9     12-30    132<L>  |  98  |  19  ----------------------------<  171<H>  4.6   |  22  |  0.66    Ca    9.4      30 Dec 2021 06:45    TPro  6.7  /  Alb  3.3  /  TBili  0.3  /  DBili  <0.1  /  AST  27  /  ALT  51<H>  /  AlkPhos  99  12-30    proBNP: Serum Pro-Brain Natriuretic Peptide: 375 pg/mL (12-19 @ 08:45)    Lipid Profile:   HgA1c:   TSH:   PT/INR - ( 29 Dec 2021 10:47 )   PT: 12.4 sec;   INR: 1.04 ratio               Assessment and plan  ---------------------------  85 yo F from [facility], CVA with residual left weakness (~3 years ago), PEG for dysphagia, hypothyroid, COPD (on no home Oxygen), HTN, gout, p/w fever, Pt has respiratory distress, wheezing, concerning for respiratory infection, otherwise no other symptoms is reported on the chart. EMS gave solumedrol 125 through peripheral, small iv line.  pt is well known to me with hx of htn, ashd, s/p MI, cva with increasing sob on bipap in er.  can not get any hx from the pt.  sob ?respiratory failure ?sec to pneumoniae  ?pleural effusion, check ct chest noted  continue bp meds  dvt prophylaxis  abx  will consider to possible repeat echo  duoneb  dvt prophylaxis  ct scan/ surgery/ ID noted  continue amlodipine and Metoprolol for now  tachycardia sec to underlying condition, continue to observe  oob to chair as tolerated  IR, continue abx/ surgery noted  incentive spirometry  replete K  abx as per ID  repeat blood cultures, negative  increase metoprolol to 50 mg bid sec to increase bp observe closely  esdras , high risk sec to sleep apnea and her overall condition, agree with empiric therapy  will repeat  TTE in 6 weeeks  decrease Norvasc to 5 mg daily

## 2021-12-31 NOTE — PROGRESS NOTE ADULT - ASSESSMENT
86   year old female      h/o hemorrhagic CVA, has  PEG,  HTN, MI,      HLD, gout   right Ca breast. s/p mastectomy in 2019,  s/p  sentinel node  localization.  4/4,  were  negative         *  p/w SOB and  acute respiratory distress,  was  on   bipap  in er   with  elevated wbc of 17,000 on arrival,  from pna/  probable aspiration/ gram negative pna  cxr,? pl effusion, right  lung opacit   *   s/p cva, has  PEG,  npo/,  on  synthroid, Keppra  ,  *  HTN, on meds  per  card  prior  echo,  normal ef  *  h/o ca breast. /, s/p  R  mastectomy  *  bacteremia. / staph epi, meth R  ct  c/a/p, ?  pna, perforated  appendicitis,  ?  mets  to  acetabulum   surg/ IR  and  oncology eval dr pacheco    appreciate  excellent  care of  house  staff  per  surg, no  intervention  *   sepsis  on  arrival, is  resolving  from  perforated  appendix/ and  Staph epi  bacteremia which is  persistent,  hence  cannot  dismiss  it as  a contaminant    and  also, with  echo, vegetation on  aortic  valve/ endocarditis, ?  esdras,  may  not  change   rx  plan,  will need  extended  course  of  ab   / will defer to card    on iv vanco, . Cefepime   and flagyl   rpt ct  noted,  RLL  phlegmon,  and  80  cc of  pus  drained by  IR .  c/s  with  ecoli, e coccus  and  morganella  has abd  drain.  f/p by IR  on iv cefepime/ iv vanco.  follow trough  pe r card , pt high risk for esdras  and given that . this will not alter rx. pt  will need   extended  course  of ab   elevated lfts , noted, are normal  picc  line    on iv  vanco and ertapenem/ follow vanco level       rd< from: Xray Chest 1 View- PORTABLE-Urgent (12.19.21 @ 09:20) >  IMPRESSION:  Low lung volumes. New small left lower lung hazy opacity may represent   atelectasis versus pleural effusion. Redemonstration of right upper lung   perihilar opacity largely unchanged prior exam.  --- End of Report --  < end of copied text >

## 2021-12-31 NOTE — PROGRESS NOTE ADULT - SUBJECTIVE AND OBJECTIVE BOX
afebriel    REVIEW OF SYSTEMS:  GEN: no fever,    no chills  RESP: no SOB,   no cough  CVS: no chest pain,   no palpitations  GI: no abdominal pain,   no nausea,   no vomiting,   no constipation,   no diarrhea  : no dysuria,   no frequency  NEURO: no headache,   no dizziness  PSYCH: no depression,   not anxious  Derm : no rash    MEDICATIONS  (STANDING):  allopurinol 100 milliGRAM(s) Oral daily  amLODIPine   Tablet 10 milliGRAM(s) Oral daily  artificial  tears Solution 1 Drop(s) Both EYES two times a day  atorvastatin 20 milliGRAM(s) Oral at bedtime  chlorhexidine 4% Liquid 1 Application(s) Topical <User Schedule>  dextrose 40% Gel 15 Gram(s) Oral once  dextrose 5%. 1000 milliLiter(s) (50 mL/Hr) IV Continuous <Continuous>  dextrose 5%. 1000 milliLiter(s) (100 mL/Hr) IV Continuous <Continuous>  dextrose 50% Injectable 25 Gram(s) IV Push once  dextrose 50% Injectable 25 Gram(s) IV Push once  dextrose 50% Injectable 12.5 Gram(s) IV Push once  ertapenem  IVPB 1000 milliGRAM(s) IV Intermittent every 24 hours  glucagon  Injectable 1 milliGRAM(s) IntraMuscular once  heparin   Injectable 5000 Unit(s) SubCutaneous every 8 hours  insulin lispro (ADMELOG) corrective regimen sliding scale   SubCutaneous every 6 hours  levETIRAcetam  Solution 750 milliGRAM(s) Oral two times a day  levothyroxine 75 MICROGram(s) Oral daily  metoprolol tartrate 50 milliGRAM(s) Oral two times a day  nystatin Cream 1 Application(s) Topical two times a day  vancomycin  IVPB 1000 milliGRAM(s) IV Intermittent every 24 hours    MEDICATIONS  (PRN):  acetaminophen     Tablet .. 650 milliGRAM(s) Oral every 6 hours PRN Temp greater or equal to 38C (100.4F), Mild Pain (1 - 3)  melatonin 3 milliGRAM(s) Oral at bedtime PRN Insomnia  sodium chloride 0.9% lock flush 10 milliLiter(s) IV Push every 1 hour PRN Pre/post blood products, medications, blood draw, and to maintain line patency      Vital Signs Last 24 Hrs  T(C): 37 (31 Dec 2021 04:38), Max: 37 (31 Dec 2021 04:38)  T(F): 98.6 (31 Dec 2021 04:38), Max: 98.6 (31 Dec 2021 04:38)  HR: 104 (31 Dec 2021 04:38) (96 - 111)  BP: 112/68 (31 Dec 2021 04:38) (96/38 - 128/74)  BP(mean): --  RR: 22 (31 Dec 2021 04:52) (18 - 22)  SpO2: 90% (31 Dec 2021 04:52) (89% - 97%)  CAPILLARY BLOOD GLUCOSE      POCT Blood Glucose.: 165 mg/dL (31 Dec 2021 06:45)  POCT Blood Glucose.: 166 mg/dL (31 Dec 2021 00:20)  POCT Blood Glucose.: 140 mg/dL (30 Dec 2021 19:53)  POCT Blood Glucose.: 159 mg/dL (30 Dec 2021 11:59)    I&O's Summary    30 Dec 2021 07:01  -  31 Dec 2021 07:00  --------------------------------------------------------  IN: 0 mL / OUT: 200 mL / NET: -200 mL        PHYSICAL EXAM:  HEAD:  Atraumatic, Normocephalic  NECK: Supple, No   JVD  CHEST/LUNG:   no     rales,     no,    rhonchi  HEART: Regular rate and rhythm;         murmur  ABDOMEN: Soft, Nontender, ;   EXTREMITIES:    no    edema  NEUROLOGY:  alert    LABS:                        9.6    8.61  )-----------( 229      ( 30 Dec 2021 06:49 )             31.9     12-30    132<L>  |  98  |  19  ----------------------------<  171<H>  4.6   |  22  |  0.66    Ca    9.4      30 Dec 2021 06:45    TPro  6.7  /  Alb  3.3  /  TBili  0.3  /  DBili  <0.1  /  AST  27  /  ALT  51<H>  /  AlkPhos  99  12-30    PT/INR - ( 29 Dec 2021 10:47 )   PT: 12.4 sec;   INR: 1.04 ratio                                 Consultant(s) Notes Reviewed:      Care Discussed with Consultants/Other Providers:

## 2022-01-01 ENCOUNTER — INPATIENT (INPATIENT)
Facility: HOSPITAL | Age: 87
LOS: 11 days | Discharge: NURSING, REHAB OR LONG-TERM CARE | DRG: 189 | End: 2022-09-08
Attending: INTERNAL MEDICINE | Admitting: STUDENT IN AN ORGANIZED HEALTH CARE EDUCATION/TRAINING PROGRAM
Payer: COMMERCIAL

## 2022-01-01 ENCOUNTER — TRANSCRIPTION ENCOUNTER (OUTPATIENT)
Age: 87
End: 2022-01-01

## 2022-01-01 ENCOUNTER — RESULT REVIEW (OUTPATIENT)
Age: 87
End: 2022-01-01

## 2022-01-01 ENCOUNTER — INPATIENT (INPATIENT)
Facility: HOSPITAL | Age: 87
LOS: 19 days | Discharge: NURSING, REHAB OR LONG-TERM CARE | DRG: 205 | End: 2022-02-09
Attending: INTERNAL MEDICINE | Admitting: INTERNAL MEDICINE
Payer: COMMERCIAL

## 2022-01-01 ENCOUNTER — INPATIENT (INPATIENT)
Facility: HOSPITAL | Age: 87
LOS: 28 days | DRG: 208 | End: 2022-10-07
Attending: INTERNAL MEDICINE | Admitting: INTERNAL MEDICINE
Payer: COMMERCIAL

## 2022-01-01 ENCOUNTER — INPATIENT (INPATIENT)
Facility: HOSPITAL | Age: 87
LOS: 4 days | Discharge: NURSING, REHAB OR LONG-TERM CARE | DRG: 378 | End: 2022-03-21
Attending: INTERNAL MEDICINE | Admitting: INTERNAL MEDICINE
Payer: COMMERCIAL

## 2022-01-01 ENCOUNTER — APPOINTMENT (OUTPATIENT)
Dept: OTOLARYNGOLOGY | Facility: CLINIC | Age: 87
End: 2022-01-01

## 2022-01-01 VITALS
RESPIRATION RATE: 19 BRPM | SYSTOLIC BLOOD PRESSURE: 101 MMHG | OXYGEN SATURATION: 96 % | TEMPERATURE: 98 F | HEART RATE: 83 BPM | DIASTOLIC BLOOD PRESSURE: 68 MMHG

## 2022-01-01 VITALS
HEIGHT: 64 IN | TEMPERATURE: 99 F | WEIGHT: 176.37 LBS | DIASTOLIC BLOOD PRESSURE: 70 MMHG | RESPIRATION RATE: 22 BRPM | SYSTOLIC BLOOD PRESSURE: 112 MMHG | OXYGEN SATURATION: 96 % | HEART RATE: 88 BPM

## 2022-01-01 VITALS
RESPIRATION RATE: 24 BRPM | TEMPERATURE: 98 F | HEART RATE: 109 BPM | WEIGHT: 199.96 LBS | DIASTOLIC BLOOD PRESSURE: 49 MMHG | OXYGEN SATURATION: 91 % | SYSTOLIC BLOOD PRESSURE: 116 MMHG | HEIGHT: 64 IN

## 2022-01-01 VITALS
RESPIRATION RATE: 18 BRPM | OXYGEN SATURATION: 94 % | HEIGHT: 64 IN | HEART RATE: 90 BPM | DIASTOLIC BLOOD PRESSURE: 54 MMHG | SYSTOLIC BLOOD PRESSURE: 97 MMHG | TEMPERATURE: 98 F

## 2022-01-01 VITALS
DIASTOLIC BLOOD PRESSURE: 69 MMHG | TEMPERATURE: 98 F | SYSTOLIC BLOOD PRESSURE: 115 MMHG | OXYGEN SATURATION: 93 % | HEART RATE: 111 BPM | RESPIRATION RATE: 18 BRPM

## 2022-01-01 VITALS — OXYGEN SATURATION: 93 % | RESPIRATION RATE: 20 BRPM

## 2022-01-01 VITALS
HEART RATE: 83 BPM | TEMPERATURE: 98 F | SYSTOLIC BLOOD PRESSURE: 142 MMHG | OXYGEN SATURATION: 96 % | DIASTOLIC BLOOD PRESSURE: 53 MMHG | RESPIRATION RATE: 18 BRPM

## 2022-01-01 VITALS — HEIGHT: 64 IN

## 2022-01-01 DIAGNOSIS — Z71.89 OTHER SPECIFIED COUNSELING: ICD-10-CM

## 2022-01-01 DIAGNOSIS — I63.9 CEREBRAL INFARCTION, UNSPECIFIED: ICD-10-CM

## 2022-01-01 DIAGNOSIS — C50.919 MALIGNANT NEOPLASM OF UNSPECIFIED SITE OF UNSPECIFIED FEMALE BREAST: ICD-10-CM

## 2022-01-01 DIAGNOSIS — Z29.9 ENCOUNTER FOR PROPHYLACTIC MEASURES, UNSPECIFIED: ICD-10-CM

## 2022-01-01 DIAGNOSIS — M10.9 GOUT, UNSPECIFIED: ICD-10-CM

## 2022-01-01 DIAGNOSIS — K35.32 ACUTE APPENDICITIS WITH PERFORATION, LOCALIZED PERITONITIS, AND GANGRENE, WITHOUT ABSCESS: ICD-10-CM

## 2022-01-01 DIAGNOSIS — I25.10 ATHEROSCLEROTIC HEART DISEASE OF NATIVE CORONARY ARTERY WITHOUT ANGINA PECTORIS: ICD-10-CM

## 2022-01-01 DIAGNOSIS — E87.1 HYPO-OSMOLALITY AND HYPONATREMIA: ICD-10-CM

## 2022-01-01 DIAGNOSIS — R82.79 OTHER ABNORMAL FINDINGS ON MICROBIOLOGICAL EXAMINATION OF URINE: ICD-10-CM

## 2022-01-01 DIAGNOSIS — R06.02 SHORTNESS OF BREATH: ICD-10-CM

## 2022-01-01 DIAGNOSIS — I10 ESSENTIAL (PRIMARY) HYPERTENSION: ICD-10-CM

## 2022-01-01 DIAGNOSIS — J39.8 OTHER SPECIFIED DISEASES OF UPPER RESPIRATORY TRACT: ICD-10-CM

## 2022-01-01 DIAGNOSIS — Z51.5 ENCOUNTER FOR PALLIATIVE CARE: ICD-10-CM

## 2022-01-01 DIAGNOSIS — D72.829 ELEVATED WHITE BLOOD CELL COUNT, UNSPECIFIED: ICD-10-CM

## 2022-01-01 DIAGNOSIS — E78.5 HYPERLIPIDEMIA, UNSPECIFIED: ICD-10-CM

## 2022-01-01 DIAGNOSIS — E03.9 HYPOTHYROIDISM, UNSPECIFIED: ICD-10-CM

## 2022-01-01 DIAGNOSIS — R06.00 DYSPNEA, UNSPECIFIED: ICD-10-CM

## 2022-01-01 DIAGNOSIS — J96.01 ACUTE RESPIRATORY FAILURE WITH HYPOXIA: ICD-10-CM

## 2022-01-01 DIAGNOSIS — E11.9 TYPE 2 DIABETES MELLITUS WITHOUT COMPLICATIONS: ICD-10-CM

## 2022-01-01 DIAGNOSIS — I35.0 NONRHEUMATIC AORTIC (VALVE) STENOSIS: ICD-10-CM

## 2022-01-01 DIAGNOSIS — J69.0 PNEUMONITIS DUE TO INHALATION OF FOOD AND VOMIT: ICD-10-CM

## 2022-01-01 DIAGNOSIS — K92.1 MELENA: ICD-10-CM

## 2022-01-01 DIAGNOSIS — Z93.1 GASTROSTOMY STATUS: ICD-10-CM

## 2022-01-01 DIAGNOSIS — R78.81 BACTEREMIA: ICD-10-CM

## 2022-01-01 DIAGNOSIS — Z79.2 LONG TERM (CURRENT) USE OF ANTIBIOTICS: ICD-10-CM

## 2022-01-01 DIAGNOSIS — R53.2 FUNCTIONAL QUADRIPLEGIA: ICD-10-CM

## 2022-01-01 LAB
% ALBUMIN: 50.7 % — SIGNIFICANT CHANGE UP
% ALPHA 1: 7.6 % — SIGNIFICANT CHANGE UP
% ALPHA 2: 15.6 % — SIGNIFICANT CHANGE UP
% BETA: 14 % — SIGNIFICANT CHANGE UP
% GAMMA: 12.1 % — SIGNIFICANT CHANGE UP
-  AMPICILLIN: SIGNIFICANT CHANGE UP
-  AMPICILLIN: SIGNIFICANT CHANGE UP
-  CIPROFLOXACIN: SIGNIFICANT CHANGE UP
-  CIPROFLOXACIN: SIGNIFICANT CHANGE UP
-  DAPTOMYCIN: SIGNIFICANT CHANGE UP
-  LEVOFLOXACIN: SIGNIFICANT CHANGE UP
-  LEVOFLOXACIN: SIGNIFICANT CHANGE UP
-  LINEZOLID: SIGNIFICANT CHANGE UP
-  NITROFURANTOIN: SIGNIFICANT CHANGE UP
-  NITROFURANTOIN: SIGNIFICANT CHANGE UP
-  PENICILLIN: SIGNIFICANT CHANGE UP
-  RIFAMPIN: SIGNIFICANT CHANGE UP
-  TETRACYCLINE: SIGNIFICANT CHANGE UP
-  TETRACYCLINE: SIGNIFICANT CHANGE UP
-  VANCOMYCIN: SIGNIFICANT CHANGE UP
-  VANCOMYCIN: SIGNIFICANT CHANGE UP
A1C WITH ESTIMATED AVERAGE GLUCOSE RESULT: 6.7 % — HIGH (ref 4–5.6)
A1C WITH ESTIMATED AVERAGE GLUCOSE RESULT: 7 % — HIGH (ref 4–5.6)
A1C WITH ESTIMATED AVERAGE GLUCOSE RESULT: SIGNIFICANT CHANGE UP (ref 4–5.6)
ALBUMIN SERPL ELPH-MCNC: 1.5 G/DL — LOW (ref 3.3–5)
ALBUMIN SERPL ELPH-MCNC: 2.4 G/DL — LOW (ref 3.3–5)
ALBUMIN SERPL ELPH-MCNC: 3 G/DL — LOW (ref 3.3–5)
ALBUMIN SERPL ELPH-MCNC: 3.2 G/DL — LOW (ref 3.3–5)
ALBUMIN SERPL ELPH-MCNC: 3.2 G/DL — LOW (ref 3.6–5.5)
ALBUMIN SERPL ELPH-MCNC: 3.3 G/DL — SIGNIFICANT CHANGE UP (ref 3.3–5)
ALBUMIN SERPL ELPH-MCNC: 3.5 G/DL — SIGNIFICANT CHANGE UP (ref 3.3–5)
ALBUMIN SERPL ELPH-MCNC: 3.6 G/DL — SIGNIFICANT CHANGE UP (ref 3.3–5)
ALBUMIN SERPL ELPH-MCNC: 3.6 G/DL — SIGNIFICANT CHANGE UP (ref 3.3–5)
ALBUMIN SERPL ELPH-MCNC: 3.7 G/DL — SIGNIFICANT CHANGE UP (ref 3.3–5)
ALBUMIN SERPL ELPH-MCNC: 3.8 G/DL — SIGNIFICANT CHANGE UP (ref 3.3–5)
ALBUMIN SERPL ELPH-MCNC: 3.9 G/DL — SIGNIFICANT CHANGE UP (ref 3.3–5)
ALBUMIN SERPL ELPH-MCNC: 4 G/DL — SIGNIFICANT CHANGE UP (ref 3.3–5)
ALBUMIN SERPL ELPH-MCNC: 4.1 G/DL — SIGNIFICANT CHANGE UP (ref 3.3–5)
ALBUMIN/GLOB SERPL ELPH: 1 RATIO — SIGNIFICANT CHANGE UP
ALP SERPL-CCNC: 100 U/L — SIGNIFICANT CHANGE UP (ref 40–120)
ALP SERPL-CCNC: 105 U/L — SIGNIFICANT CHANGE UP (ref 40–120)
ALP SERPL-CCNC: 110 U/L — SIGNIFICANT CHANGE UP (ref 40–120)
ALP SERPL-CCNC: 118 U/L — SIGNIFICANT CHANGE UP (ref 40–120)
ALP SERPL-CCNC: 137 U/L — HIGH (ref 40–120)
ALP SERPL-CCNC: 137 U/L — HIGH (ref 40–120)
ALP SERPL-CCNC: 156 U/L — HIGH (ref 40–120)
ALP SERPL-CCNC: 158 U/L — HIGH (ref 40–120)
ALP SERPL-CCNC: 176 U/L — HIGH (ref 40–120)
ALP SERPL-CCNC: 201 U/L — HIGH (ref 40–120)
ALP SERPL-CCNC: 204 U/L — HIGH (ref 40–120)
ALP SERPL-CCNC: 216 U/L — HIGH (ref 40–120)
ALP SERPL-CCNC: 216 U/L — HIGH (ref 40–120)
ALP SERPL-CCNC: 218 U/L — HIGH (ref 40–120)
ALP SERPL-CCNC: 230 U/L — HIGH (ref 40–120)
ALP SERPL-CCNC: 234 U/L — HIGH (ref 40–120)
ALP SERPL-CCNC: 235 U/L — HIGH (ref 40–120)
ALP SERPL-CCNC: 239 U/L — HIGH (ref 40–120)
ALP SERPL-CCNC: 82 U/L — SIGNIFICANT CHANGE UP (ref 40–120)
ALP SERPL-CCNC: 86 U/L — SIGNIFICANT CHANGE UP (ref 40–120)
ALP SERPL-CCNC: 88 U/L — SIGNIFICANT CHANGE UP (ref 40–120)
ALPHA1 GLOB SERPL ELPH-MCNC: 0.5 G/DL — HIGH (ref 0.1–0.4)
ALPHA2 GLOB SERPL ELPH-MCNC: 1 G/DL — SIGNIFICANT CHANGE UP (ref 0.5–1)
ALT FLD-CCNC: 11 U/L — SIGNIFICANT CHANGE UP (ref 10–45)
ALT FLD-CCNC: 12 U/L — SIGNIFICANT CHANGE UP (ref 10–45)
ALT FLD-CCNC: 13 U/L — SIGNIFICANT CHANGE UP (ref 10–45)
ALT FLD-CCNC: 14 U/L — SIGNIFICANT CHANGE UP (ref 10–45)
ALT FLD-CCNC: 15 U/L — SIGNIFICANT CHANGE UP (ref 10–45)
ALT FLD-CCNC: 16 U/L — SIGNIFICANT CHANGE UP (ref 10–45)
ALT FLD-CCNC: 17 U/L — SIGNIFICANT CHANGE UP (ref 10–45)
ALT FLD-CCNC: 17 U/L — SIGNIFICANT CHANGE UP (ref 10–45)
ALT FLD-CCNC: 18 U/L — SIGNIFICANT CHANGE UP (ref 10–45)
ALT FLD-CCNC: 20 U/L — SIGNIFICANT CHANGE UP (ref 10–45)
ALT FLD-CCNC: 20 U/L — SIGNIFICANT CHANGE UP (ref 10–45)
ALT FLD-CCNC: 22 U/L — SIGNIFICANT CHANGE UP (ref 10–45)
ALT FLD-CCNC: 26 U/L — SIGNIFICANT CHANGE UP (ref 10–45)
ALT FLD-CCNC: 305 U/L — HIGH (ref 10–45)
ALT FLD-CCNC: 371 U/L — HIGH (ref 10–45)
ANION GAP SERPL CALC-SCNC: 11 MMOL/L — SIGNIFICANT CHANGE UP (ref 5–17)
ANION GAP SERPL CALC-SCNC: 12 MMOL/L — SIGNIFICANT CHANGE UP (ref 5–17)
ANION GAP SERPL CALC-SCNC: 13 MMOL/L — SIGNIFICANT CHANGE UP (ref 5–17)
ANION GAP SERPL CALC-SCNC: 14 MMOL/L — SIGNIFICANT CHANGE UP (ref 5–17)
ANION GAP SERPL CALC-SCNC: 15 MMOL/L — SIGNIFICANT CHANGE UP (ref 5–17)
ANION GAP SERPL CALC-SCNC: 16 MMOL/L — SIGNIFICANT CHANGE UP (ref 5–17)
ANION GAP SERPL CALC-SCNC: 16 MMOL/L — SIGNIFICANT CHANGE UP (ref 5–17)
ANION GAP SERPL CALC-SCNC: 17 MMOL/L — SIGNIFICANT CHANGE UP (ref 5–17)
ANION GAP SERPL CALC-SCNC: 18 MMOL/L — HIGH (ref 5–17)
ANION GAP SERPL CALC-SCNC: 19 MMOL/L — HIGH (ref 5–17)
ANION GAP SERPL CALC-SCNC: 28 MMOL/L — HIGH (ref 5–17)
ANION GAP SERPL CALC-SCNC: 8 MMOL/L — SIGNIFICANT CHANGE UP (ref 5–17)
ANION GAP SERPL CALC-SCNC: 9 MMOL/L — SIGNIFICANT CHANGE UP (ref 5–17)
ANISOCYTOSIS BLD QL: SLIGHT — SIGNIFICANT CHANGE UP
APPEARANCE UR: ABNORMAL
APPEARANCE UR: ABNORMAL
APPEARANCE UR: CLEAR — SIGNIFICANT CHANGE UP
APTT BLD: 29.3 SEC — SIGNIFICANT CHANGE UP (ref 27.5–35.5)
APTT BLD: 30.8 SEC — SIGNIFICANT CHANGE UP (ref 27.5–35.5)
APTT BLD: 31 SEC — SIGNIFICANT CHANGE UP (ref 27.5–35.5)
APTT BLD: 31.9 SEC — SIGNIFICANT CHANGE UP (ref 27.5–35.5)
APTT BLD: 32.1 SEC — SIGNIFICANT CHANGE UP (ref 27.5–35.5)
APTT BLD: 33.8 SEC — SIGNIFICANT CHANGE UP (ref 27.5–35.5)
AST SERPL-CCNC: 11 U/L — SIGNIFICANT CHANGE UP (ref 10–40)
AST SERPL-CCNC: 14 U/L — SIGNIFICANT CHANGE UP (ref 10–40)
AST SERPL-CCNC: 15 U/L — SIGNIFICANT CHANGE UP (ref 10–40)
AST SERPL-CCNC: 16 U/L — SIGNIFICANT CHANGE UP (ref 10–40)
AST SERPL-CCNC: 17 U/L — SIGNIFICANT CHANGE UP (ref 10–40)
AST SERPL-CCNC: 18 U/L — SIGNIFICANT CHANGE UP (ref 10–40)
AST SERPL-CCNC: 18 U/L — SIGNIFICANT CHANGE UP (ref 10–40)
AST SERPL-CCNC: 19 U/L — SIGNIFICANT CHANGE UP (ref 10–40)
AST SERPL-CCNC: 20 U/L — SIGNIFICANT CHANGE UP (ref 10–40)
AST SERPL-CCNC: 22 U/L — SIGNIFICANT CHANGE UP (ref 10–40)
AST SERPL-CCNC: 22 U/L — SIGNIFICANT CHANGE UP (ref 10–40)
AST SERPL-CCNC: 24 U/L — SIGNIFICANT CHANGE UP (ref 10–40)
AST SERPL-CCNC: 29 U/L — SIGNIFICANT CHANGE UP (ref 10–40)
AST SERPL-CCNC: 29 U/L — SIGNIFICANT CHANGE UP (ref 10–40)
AST SERPL-CCNC: 343 U/L — HIGH (ref 10–40)
AST SERPL-CCNC: 37 U/L — SIGNIFICANT CHANGE UP (ref 10–40)
AST SERPL-CCNC: 379 U/L — HIGH (ref 10–40)
B PERT DNA SPEC QL NAA+PROBE: SIGNIFICANT CHANGE UP
B-GLOBULIN SERPL ELPH-MCNC: 0.9 G/DL — SIGNIFICANT CHANGE UP (ref 0.5–1)
BACTERIA # UR AUTO: ABNORMAL
BACTERIA # UR AUTO: NEGATIVE — SIGNIFICANT CHANGE UP
BASE EXCESS BLDA CALC-SCNC: 3.6 MMOL/L — HIGH (ref -2–3)
BASE EXCESS BLDV CALC-SCNC: -1.2 MMOL/L — SIGNIFICANT CHANGE UP (ref -2–3)
BASE EXCESS BLDV CALC-SCNC: -1.2 MMOL/L — SIGNIFICANT CHANGE UP (ref -2–3)
BASE EXCESS BLDV CALC-SCNC: -10.7 MMOL/L — LOW (ref -2–3)
BASE EXCESS BLDV CALC-SCNC: -2.3 MMOL/L — LOW (ref -2–3)
BASE EXCESS BLDV CALC-SCNC: -2.4 MMOL/L — LOW (ref -2–2)
BASE EXCESS BLDV CALC-SCNC: 0.1 MMOL/L — SIGNIFICANT CHANGE UP (ref -2–3)
BASE EXCESS BLDV CALC-SCNC: 0.6 MMOL/L — SIGNIFICANT CHANGE UP (ref -2–3)
BASE EXCESS BLDV CALC-SCNC: 1.4 MMOL/L — SIGNIFICANT CHANGE UP (ref -2–2)
BASE EXCESS BLDV CALC-SCNC: 2.1 MMOL/L — SIGNIFICANT CHANGE UP (ref -2–3)
BASE EXCESS BLDV CALC-SCNC: 3.2 MMOL/L — HIGH (ref -2–3)
BASE EXCESS BLDV CALC-SCNC: 3.6 MMOL/L — HIGH (ref -2–3)
BASOPHILS # BLD AUTO: 0 K/UL — SIGNIFICANT CHANGE UP (ref 0–0.2)
BASOPHILS # BLD AUTO: 0 K/UL — SIGNIFICANT CHANGE UP (ref 0–0.2)
BASOPHILS # BLD AUTO: 0.01 K/UL — SIGNIFICANT CHANGE UP (ref 0–0.2)
BASOPHILS # BLD AUTO: 0.03 K/UL — SIGNIFICANT CHANGE UP (ref 0–0.2)
BASOPHILS # BLD AUTO: 0.04 K/UL — SIGNIFICANT CHANGE UP (ref 0–0.2)
BASOPHILS # BLD AUTO: 0.04 K/UL — SIGNIFICANT CHANGE UP (ref 0–0.2)
BASOPHILS # BLD AUTO: 0.08 K/UL — SIGNIFICANT CHANGE UP (ref 0–0.2)
BASOPHILS NFR BLD AUTO: 0 % — SIGNIFICANT CHANGE UP (ref 0–2)
BASOPHILS NFR BLD AUTO: 0 % — SIGNIFICANT CHANGE UP (ref 0–2)
BASOPHILS NFR BLD AUTO: 0.1 % — SIGNIFICANT CHANGE UP (ref 0–2)
BASOPHILS NFR BLD AUTO: 0.2 % — SIGNIFICANT CHANGE UP (ref 0–2)
BASOPHILS NFR BLD AUTO: 0.3 % — SIGNIFICANT CHANGE UP (ref 0–2)
BASOPHILS NFR BLD AUTO: 0.4 % — SIGNIFICANT CHANGE UP (ref 0–2)
BASOPHILS NFR BLD AUTO: 1.2 % — SIGNIFICANT CHANGE UP (ref 0–2)
BCA 255 TISS QL IMSTN: 58.1 U/ML — HIGH
BILIRUB DIRECT SERPL-MCNC: 0.1 MG/DL — SIGNIFICANT CHANGE UP (ref 0–0.3)
BILIRUB DIRECT SERPL-MCNC: 0.1 MG/DL — SIGNIFICANT CHANGE UP (ref 0–0.3)
BILIRUB DIRECT SERPL-MCNC: 0.2 MG/DL — SIGNIFICANT CHANGE UP (ref 0–0.3)
BILIRUB DIRECT SERPL-MCNC: 0.2 MG/DL — SIGNIFICANT CHANGE UP (ref 0–0.3)
BILIRUB INDIRECT FLD-MCNC: 0.3 MG/DL — SIGNIFICANT CHANGE UP (ref 0.2–1)
BILIRUB SERPL-MCNC: 0.2 MG/DL — SIGNIFICANT CHANGE UP (ref 0.2–1.2)
BILIRUB SERPL-MCNC: 0.3 MG/DL — SIGNIFICANT CHANGE UP (ref 0.2–1.2)
BILIRUB SERPL-MCNC: 0.4 MG/DL — SIGNIFICANT CHANGE UP (ref 0.2–1.2)
BILIRUB SERPL-MCNC: 0.5 MG/DL — SIGNIFICANT CHANGE UP (ref 0.2–1.2)
BILIRUB UR-MCNC: NEGATIVE — SIGNIFICANT CHANGE UP
BLD GP AB SCN SERPL QL: NEGATIVE — SIGNIFICANT CHANGE UP
BUN SERPL-MCNC: 10 MG/DL — SIGNIFICANT CHANGE UP (ref 7–23)
BUN SERPL-MCNC: 14 MG/DL — SIGNIFICANT CHANGE UP (ref 7–23)
BUN SERPL-MCNC: 15 MG/DL — SIGNIFICANT CHANGE UP (ref 7–23)
BUN SERPL-MCNC: 20 MG/DL — SIGNIFICANT CHANGE UP (ref 7–23)
BUN SERPL-MCNC: 21 MG/DL — SIGNIFICANT CHANGE UP (ref 7–23)
BUN SERPL-MCNC: 21 MG/DL — SIGNIFICANT CHANGE UP (ref 7–23)
BUN SERPL-MCNC: 22 MG/DL — SIGNIFICANT CHANGE UP (ref 7–23)
BUN SERPL-MCNC: 22 MG/DL — SIGNIFICANT CHANGE UP (ref 7–23)
BUN SERPL-MCNC: 23 MG/DL — SIGNIFICANT CHANGE UP (ref 7–23)
BUN SERPL-MCNC: 24 MG/DL — HIGH (ref 7–23)
BUN SERPL-MCNC: 24 MG/DL — HIGH (ref 7–23)
BUN SERPL-MCNC: 25 MG/DL — HIGH (ref 7–23)
BUN SERPL-MCNC: 25 MG/DL — HIGH (ref 7–23)
BUN SERPL-MCNC: 26 MG/DL — HIGH (ref 7–23)
BUN SERPL-MCNC: 27 MG/DL — HIGH (ref 7–23)
BUN SERPL-MCNC: 27 MG/DL — HIGH (ref 7–23)
BUN SERPL-MCNC: 31 MG/DL — HIGH (ref 7–23)
BUN SERPL-MCNC: 31 MG/DL — HIGH (ref 7–23)
BUN SERPL-MCNC: 32 MG/DL — HIGH (ref 7–23)
BUN SERPL-MCNC: 32 MG/DL — HIGH (ref 7–23)
BUN SERPL-MCNC: 33 MG/DL — HIGH (ref 7–23)
BUN SERPL-MCNC: 34 MG/DL — HIGH (ref 7–23)
BUN SERPL-MCNC: 35 MG/DL — HIGH (ref 7–23)
BUN SERPL-MCNC: 36 MG/DL — HIGH (ref 7–23)
BUN SERPL-MCNC: 37 MG/DL — HIGH (ref 7–23)
BUN SERPL-MCNC: 37 MG/DL — HIGH (ref 7–23)
BUN SERPL-MCNC: 42 MG/DL — HIGH (ref 7–23)
BUN SERPL-MCNC: 44 MG/DL — HIGH (ref 7–23)
BUN SERPL-MCNC: 47 MG/DL — HIGH (ref 7–23)
BUN SERPL-MCNC: 8 MG/DL — SIGNIFICANT CHANGE UP (ref 7–23)
BUN SERPL-MCNC: 8 MG/DL — SIGNIFICANT CHANGE UP (ref 7–23)
C PNEUM DNA SPEC QL NAA+PROBE: SIGNIFICANT CHANGE UP
CA-I SERPL-SCNC: 1.13 MMOL/L — LOW (ref 1.15–1.33)
CA-I SERPL-SCNC: 1.22 MMOL/L — SIGNIFICANT CHANGE UP (ref 1.15–1.33)
CA-I SERPL-SCNC: 1.24 MMOL/L — SIGNIFICANT CHANGE UP (ref 1.15–1.33)
CA-I SERPL-SCNC: 1.27 MMOL/L — SIGNIFICANT CHANGE UP (ref 1.15–1.33)
CA-I SERPL-SCNC: 1.31 MMOL/L — SIGNIFICANT CHANGE UP (ref 1.15–1.33)
CA-I SERPL-SCNC: 1.32 MMOL/L — SIGNIFICANT CHANGE UP (ref 1.15–1.33)
CALCIUM SERPL-MCNC: 10.3 MG/DL — SIGNIFICANT CHANGE UP (ref 8.4–10.5)
CALCIUM SERPL-MCNC: 7.6 MG/DL — LOW (ref 8.4–10.5)
CALCIUM SERPL-MCNC: 8.3 MG/DL — LOW (ref 8.4–10.5)
CALCIUM SERPL-MCNC: 8.4 MG/DL — SIGNIFICANT CHANGE UP (ref 8.4–10.5)
CALCIUM SERPL-MCNC: 8.6 MG/DL — SIGNIFICANT CHANGE UP (ref 8.4–10.5)
CALCIUM SERPL-MCNC: 8.6 MG/DL — SIGNIFICANT CHANGE UP (ref 8.4–10.5)
CALCIUM SERPL-MCNC: 8.7 MG/DL — SIGNIFICANT CHANGE UP (ref 8.4–10.5)
CALCIUM SERPL-MCNC: 8.8 MG/DL — SIGNIFICANT CHANGE UP (ref 8.4–10.5)
CALCIUM SERPL-MCNC: 9 MG/DL — SIGNIFICANT CHANGE UP (ref 8.4–10.5)
CALCIUM SERPL-MCNC: 9.1 MG/DL — SIGNIFICANT CHANGE UP (ref 8.4–10.5)
CALCIUM SERPL-MCNC: 9.2 MG/DL — SIGNIFICANT CHANGE UP (ref 8.4–10.5)
CALCIUM SERPL-MCNC: 9.3 MG/DL — SIGNIFICANT CHANGE UP (ref 8.4–10.5)
CALCIUM SERPL-MCNC: 9.4 MG/DL — SIGNIFICANT CHANGE UP (ref 8.4–10.5)
CALCIUM SERPL-MCNC: 9.5 MG/DL — SIGNIFICANT CHANGE UP (ref 8.4–10.5)
CALCIUM SERPL-MCNC: 9.6 MG/DL — SIGNIFICANT CHANGE UP (ref 8.4–10.5)
CALCIUM SERPL-MCNC: 9.6 MG/DL — SIGNIFICANT CHANGE UP (ref 8.4–10.5)
CALCIUM SERPL-MCNC: 9.7 MG/DL — SIGNIFICANT CHANGE UP (ref 8.4–10.5)
CALCIUM SERPL-MCNC: 9.8 MG/DL — SIGNIFICANT CHANGE UP (ref 8.4–10.5)
CALCIUM SERPL-MCNC: 9.9 MG/DL — SIGNIFICANT CHANGE UP (ref 8.4–10.5)
CALCIUM SERPL-MCNC: 9.9 MG/DL — SIGNIFICANT CHANGE UP (ref 8.4–10.5)
CHLORIDE BLDV-SCNC: 100 MMOL/L — SIGNIFICANT CHANGE UP (ref 96–108)
CHLORIDE BLDV-SCNC: 102 MMOL/L — SIGNIFICANT CHANGE UP (ref 96–108)
CHLORIDE BLDV-SCNC: 108 MMOL/L — SIGNIFICANT CHANGE UP (ref 96–108)
CHLORIDE BLDV-SCNC: 95 MMOL/L — LOW (ref 96–108)
CHLORIDE BLDV-SCNC: 98 MMOL/L — SIGNIFICANT CHANGE UP (ref 96–108)
CHLORIDE BLDV-SCNC: 99 MMOL/L — SIGNIFICANT CHANGE UP (ref 96–108)
CHLORIDE SERPL-SCNC: 100 MMOL/L — SIGNIFICANT CHANGE UP (ref 96–108)
CHLORIDE SERPL-SCNC: 101 MMOL/L — SIGNIFICANT CHANGE UP (ref 96–108)
CHLORIDE SERPL-SCNC: 101 MMOL/L — SIGNIFICANT CHANGE UP (ref 96–108)
CHLORIDE SERPL-SCNC: 102 MMOL/L — SIGNIFICANT CHANGE UP (ref 96–108)
CHLORIDE SERPL-SCNC: 103 MMOL/L — SIGNIFICANT CHANGE UP (ref 96–108)
CHLORIDE SERPL-SCNC: 104 MMOL/L — SIGNIFICANT CHANGE UP (ref 96–108)
CHLORIDE SERPL-SCNC: 105 MMOL/L — SIGNIFICANT CHANGE UP (ref 96–108)
CHLORIDE SERPL-SCNC: 106 MMOL/L — SIGNIFICANT CHANGE UP (ref 96–108)
CHLORIDE SERPL-SCNC: 106 MMOL/L — SIGNIFICANT CHANGE UP (ref 96–108)
CHLORIDE SERPL-SCNC: 107 MMOL/L — SIGNIFICANT CHANGE UP (ref 96–108)
CHLORIDE SERPL-SCNC: 109 MMOL/L — HIGH (ref 96–108)
CHLORIDE SERPL-SCNC: 109 MMOL/L — HIGH (ref 96–108)
CHLORIDE SERPL-SCNC: 114 MMOL/L — HIGH (ref 96–108)
CHLORIDE SERPL-SCNC: 93 MMOL/L — LOW (ref 96–108)
CHLORIDE SERPL-SCNC: 94 MMOL/L — LOW (ref 96–108)
CHLORIDE SERPL-SCNC: 96 MMOL/L — SIGNIFICANT CHANGE UP (ref 96–108)
CHLORIDE SERPL-SCNC: 96 MMOL/L — SIGNIFICANT CHANGE UP (ref 96–108)
CHLORIDE SERPL-SCNC: 98 MMOL/L — SIGNIFICANT CHANGE UP (ref 96–108)
CHLORIDE SERPL-SCNC: 99 MMOL/L — SIGNIFICANT CHANGE UP (ref 96–108)
CK MB BLD-MCNC: 2.6 % — SIGNIFICANT CHANGE UP (ref 0–3.5)
CK MB CFR SERPL CALC: 3 NG/ML — SIGNIFICANT CHANGE UP (ref 0–3.8)
CK SERPL-CCNC: 116 U/L — SIGNIFICANT CHANGE UP (ref 25–170)
CO2 BLDA-SCNC: 27 MMOL/L — HIGH (ref 19–24)
CO2 BLDV-SCNC: 20 MMOL/L — LOW (ref 22–26)
CO2 BLDV-SCNC: 25 MMOL/L — SIGNIFICANT CHANGE UP (ref 22–26)
CO2 BLDV-SCNC: 26 MMOL/L — SIGNIFICANT CHANGE UP (ref 22–26)
CO2 BLDV-SCNC: 27 MMOL/L — HIGH (ref 22–26)
CO2 BLDV-SCNC: 27 MMOL/L — HIGH (ref 22–26)
CO2 BLDV-SCNC: 28 MMOL/L — HIGH (ref 22–26)
CO2 BLDV-SCNC: 28 MMOL/L — HIGH (ref 22–26)
CO2 BLDV-SCNC: 29 MMOL/L — HIGH (ref 22–26)
CO2 BLDV-SCNC: 30 MMOL/L — HIGH (ref 22–26)
CO2 BLDV-SCNC: 30 MMOL/L — HIGH (ref 22–26)
CO2 BLDV-SCNC: 31 MMOL/L — HIGH (ref 22–26)
CO2 SERPL-SCNC: 10 MMOL/L — CRITICAL LOW (ref 22–31)
CO2 SERPL-SCNC: 17 MMOL/L — LOW (ref 22–31)
CO2 SERPL-SCNC: 17 MMOL/L — LOW (ref 22–31)
CO2 SERPL-SCNC: 18 MMOL/L — LOW (ref 22–31)
CO2 SERPL-SCNC: 20 MMOL/L — LOW (ref 22–31)
CO2 SERPL-SCNC: 21 MMOL/L — LOW (ref 22–31)
CO2 SERPL-SCNC: 22 MMOL/L — SIGNIFICANT CHANGE UP (ref 22–31)
CO2 SERPL-SCNC: 23 MMOL/L — SIGNIFICANT CHANGE UP (ref 22–31)
CO2 SERPL-SCNC: 24 MMOL/L — SIGNIFICANT CHANGE UP (ref 22–31)
CO2 SERPL-SCNC: 25 MMOL/L — SIGNIFICANT CHANGE UP (ref 22–31)
CO2 SERPL-SCNC: 25 MMOL/L — SIGNIFICANT CHANGE UP (ref 22–31)
CO2 SERPL-SCNC: 26 MMOL/L — SIGNIFICANT CHANGE UP (ref 22–31)
CO2 SERPL-SCNC: 26 MMOL/L — SIGNIFICANT CHANGE UP (ref 22–31)
CO2 SERPL-SCNC: 27 MMOL/L — SIGNIFICANT CHANGE UP (ref 22–31)
CO2 SERPL-SCNC: 27 MMOL/L — SIGNIFICANT CHANGE UP (ref 22–31)
CO2 SERPL-SCNC: 29 MMOL/L — SIGNIFICANT CHANGE UP (ref 22–31)
CO2 SERPL-SCNC: 29 MMOL/L — SIGNIFICANT CHANGE UP (ref 22–31)
COLOR SPEC: ABNORMAL
COLOR SPEC: SIGNIFICANT CHANGE UP
COLOR SPEC: YELLOW — SIGNIFICANT CHANGE UP
COMMENT - URINE: SIGNIFICANT CHANGE UP
CREAT ?TM UR-MCNC: 28 MG/DL — SIGNIFICANT CHANGE UP
CREAT SERPL-MCNC: 0.5 MG/DL — SIGNIFICANT CHANGE UP (ref 0.5–1.3)
CREAT SERPL-MCNC: 0.5 MG/DL — SIGNIFICANT CHANGE UP (ref 0.5–1.3)
CREAT SERPL-MCNC: 0.54 MG/DL — SIGNIFICANT CHANGE UP (ref 0.5–1.3)
CREAT SERPL-MCNC: 0.55 MG/DL — SIGNIFICANT CHANGE UP (ref 0.5–1.3)
CREAT SERPL-MCNC: 0.56 MG/DL — SIGNIFICANT CHANGE UP (ref 0.5–1.3)
CREAT SERPL-MCNC: 0.57 MG/DL — SIGNIFICANT CHANGE UP (ref 0.5–1.3)
CREAT SERPL-MCNC: 0.57 MG/DL — SIGNIFICANT CHANGE UP (ref 0.5–1.3)
CREAT SERPL-MCNC: 0.58 MG/DL — SIGNIFICANT CHANGE UP (ref 0.5–1.3)
CREAT SERPL-MCNC: 0.59 MG/DL — SIGNIFICANT CHANGE UP (ref 0.5–1.3)
CREAT SERPL-MCNC: 0.6 MG/DL — SIGNIFICANT CHANGE UP (ref 0.5–1.3)
CREAT SERPL-MCNC: 0.6 MG/DL — SIGNIFICANT CHANGE UP (ref 0.5–1.3)
CREAT SERPL-MCNC: 0.62 MG/DL — SIGNIFICANT CHANGE UP (ref 0.5–1.3)
CREAT SERPL-MCNC: 0.63 MG/DL — SIGNIFICANT CHANGE UP (ref 0.5–1.3)
CREAT SERPL-MCNC: 0.64 MG/DL — SIGNIFICANT CHANGE UP (ref 0.5–1.3)
CREAT SERPL-MCNC: 0.65 MG/DL — SIGNIFICANT CHANGE UP (ref 0.5–1.3)
CREAT SERPL-MCNC: 0.65 MG/DL — SIGNIFICANT CHANGE UP (ref 0.5–1.3)
CREAT SERPL-MCNC: 0.66 MG/DL — SIGNIFICANT CHANGE UP (ref 0.5–1.3)
CREAT SERPL-MCNC: 0.68 MG/DL — SIGNIFICANT CHANGE UP (ref 0.5–1.3)
CREAT SERPL-MCNC: 0.69 MG/DL — SIGNIFICANT CHANGE UP (ref 0.5–1.3)
CREAT SERPL-MCNC: 0.69 MG/DL — SIGNIFICANT CHANGE UP (ref 0.5–1.3)
CREAT SERPL-MCNC: 0.7 MG/DL — SIGNIFICANT CHANGE UP (ref 0.5–1.3)
CREAT SERPL-MCNC: 0.72 MG/DL — SIGNIFICANT CHANGE UP (ref 0.5–1.3)
CREAT SERPL-MCNC: 0.73 MG/DL — SIGNIFICANT CHANGE UP (ref 0.5–1.3)
CREAT SERPL-MCNC: 0.74 MG/DL — SIGNIFICANT CHANGE UP (ref 0.5–1.3)
CREAT SERPL-MCNC: 0.76 MG/DL — SIGNIFICANT CHANGE UP (ref 0.5–1.3)
CREAT SERPL-MCNC: 0.77 MG/DL — SIGNIFICANT CHANGE UP (ref 0.5–1.3)
CREAT SERPL-MCNC: 0.78 MG/DL — SIGNIFICANT CHANGE UP (ref 0.5–1.3)
CREAT SERPL-MCNC: 0.78 MG/DL — SIGNIFICANT CHANGE UP (ref 0.5–1.3)
CREAT SERPL-MCNC: 0.79 MG/DL — SIGNIFICANT CHANGE UP (ref 0.5–1.3)
CRP SERPL-MCNC: 5 MG/L — HIGH (ref 0–4)
CULTURE RESULTS: SIGNIFICANT CHANGE UP
D DIMER BLD IA.RAPID-MCNC: 196 NG/ML DDU — SIGNIFICANT CHANGE UP
DACRYOCYTES BLD QL SMEAR: SLIGHT — SIGNIFICANT CHANGE UP
DIFF PNL FLD: ABNORMAL
DIFF PNL FLD: ABNORMAL
DIFF PNL FLD: NEGATIVE — SIGNIFICANT CHANGE UP
EGFR: 73 ML/MIN/1.73M2 — SIGNIFICANT CHANGE UP
EGFR: 74 ML/MIN/1.73M2 — SIGNIFICANT CHANGE UP
EGFR: 74 ML/MIN/1.73M2 — SIGNIFICANT CHANGE UP
EGFR: 76 ML/MIN/1.73M2 — SIGNIFICANT CHANGE UP
EGFR: 77 ML/MIN/1.73M2 — SIGNIFICANT CHANGE UP
EGFR: 79 ML/MIN/1.73M2 — SIGNIFICANT CHANGE UP
EGFR: 81 ML/MIN/1.73M2 — SIGNIFICANT CHANGE UP
EGFR: 82 ML/MIN/1.73M2 — SIGNIFICANT CHANGE UP
EGFR: 85 ML/MIN/1.73M2 — SIGNIFICANT CHANGE UP
EGFR: 86 ML/MIN/1.73M2 — SIGNIFICANT CHANGE UP
EGFR: 86 ML/MIN/1.73M2 — SIGNIFICANT CHANGE UP
EGFR: 87 ML/MIN/1.73M2 — SIGNIFICANT CHANGE UP
EGFR: 88 ML/MIN/1.73M2 — SIGNIFICANT CHANGE UP
EGFR: 89 ML/MIN/1.73M2 — SIGNIFICANT CHANGE UP
EGFR: 90 ML/MIN/1.73M2 — SIGNIFICANT CHANGE UP
EGFR: 92 ML/MIN/1.73M2 — SIGNIFICANT CHANGE UP
EGFR: 92 ML/MIN/1.73M2 — SIGNIFICANT CHANGE UP
ELLIPTOCYTES BLD QL SMEAR: SLIGHT — SIGNIFICANT CHANGE UP
EOSINOPHIL # BLD AUTO: 0 K/UL — SIGNIFICANT CHANGE UP (ref 0–0.5)
EOSINOPHIL # BLD AUTO: 0 K/UL — SIGNIFICANT CHANGE UP (ref 0–0.5)
EOSINOPHIL # BLD AUTO: 0.01 K/UL — SIGNIFICANT CHANGE UP (ref 0–0.5)
EOSINOPHIL # BLD AUTO: 0.05 K/UL — SIGNIFICANT CHANGE UP (ref 0–0.5)
EOSINOPHIL # BLD AUTO: 0.06 K/UL — SIGNIFICANT CHANGE UP (ref 0–0.5)
EOSINOPHIL # BLD AUTO: 0.08 K/UL — SIGNIFICANT CHANGE UP (ref 0–0.5)
EOSINOPHIL # BLD AUTO: 0.34 K/UL — SIGNIFICANT CHANGE UP (ref 0–0.5)
EOSINOPHIL NFR BLD AUTO: 0 % — SIGNIFICANT CHANGE UP (ref 0–6)
EOSINOPHIL NFR BLD AUTO: 0 % — SIGNIFICANT CHANGE UP (ref 0–6)
EOSINOPHIL NFR BLD AUTO: 0.1 % — SIGNIFICANT CHANGE UP (ref 0–6)
EOSINOPHIL NFR BLD AUTO: 0.7 % — SIGNIFICANT CHANGE UP (ref 0–6)
EOSINOPHIL NFR BLD AUTO: 0.9 % — SIGNIFICANT CHANGE UP (ref 0–6)
EOSINOPHIL NFR BLD AUTO: 1 % — SIGNIFICANT CHANGE UP (ref 0–6)
EOSINOPHIL NFR BLD AUTO: 5 % — SIGNIFICANT CHANGE UP (ref 0–6)
EPI CELLS # UR: 1 /HPF — SIGNIFICANT CHANGE UP
EPI CELLS # UR: 5 /HPF — SIGNIFICANT CHANGE UP
ESTIMATED AVERAGE GLUCOSE: 146 MG/DL — HIGH (ref 68–114)
ESTIMATED AVERAGE GLUCOSE: 154 MG/DL — HIGH (ref 68–114)
FERRITIN SERPL-MCNC: 63 NG/ML — SIGNIFICANT CHANGE UP (ref 15–150)
FERRITIN SERPL-MCNC: 67 NG/ML — SIGNIFICANT CHANGE UP (ref 15–150)
FERRITIN SERPL-MCNC: 67 NG/ML — SIGNIFICANT CHANGE UP (ref 15–150)
FLUAV H1 2009 PAND RNA SPEC QL NAA+PROBE: SIGNIFICANT CHANGE UP
FLUAV H1 RNA SPEC QL NAA+PROBE: SIGNIFICANT CHANGE UP
FLUAV H3 RNA SPEC QL NAA+PROBE: SIGNIFICANT CHANGE UP
FLUAV SUBTYP SPEC NAA+PROBE: SIGNIFICANT CHANGE UP
FLUBV RNA SPEC QL NAA+PROBE: SIGNIFICANT CHANGE UP
FOLATE SERPL-MCNC: >20 NG/ML — SIGNIFICANT CHANGE UP
FOLATE SERPL-MCNC: >20 NG/ML — SIGNIFICANT CHANGE UP
GAMMA GLOBULIN: 0.8 G/DL — SIGNIFICANT CHANGE UP (ref 0.6–1.6)
GAS PNL BLDA: SIGNIFICANT CHANGE UP
GAS PNL BLDV: 128 MMOL/L — LOW (ref 136–145)
GAS PNL BLDV: 131 MMOL/L — LOW (ref 136–145)
GAS PNL BLDV: 134 MMOL/L — LOW (ref 136–145)
GAS PNL BLDV: 137 MMOL/L — SIGNIFICANT CHANGE UP (ref 136–145)
GAS PNL BLDV: 137 MMOL/L — SIGNIFICANT CHANGE UP (ref 136–145)
GAS PNL BLDV: 140 MMOL/L — SIGNIFICANT CHANGE UP (ref 136–145)
GAS PNL BLDV: SIGNIFICANT CHANGE UP
GIANT PLATELETS BLD QL SMEAR: PRESENT — SIGNIFICANT CHANGE UP
GLUCOSE BLDC GLUCOMTR-MCNC: 106 MG/DL — HIGH (ref 70–99)
GLUCOSE BLDC GLUCOMTR-MCNC: 108 MG/DL — HIGH (ref 70–99)
GLUCOSE BLDC GLUCOMTR-MCNC: 115 MG/DL — HIGH (ref 70–99)
GLUCOSE BLDC GLUCOMTR-MCNC: 116 MG/DL — HIGH (ref 70–99)
GLUCOSE BLDC GLUCOMTR-MCNC: 116 MG/DL — HIGH (ref 70–99)
GLUCOSE BLDC GLUCOMTR-MCNC: 117 MG/DL — HIGH (ref 70–99)
GLUCOSE BLDC GLUCOMTR-MCNC: 117 MG/DL — HIGH (ref 70–99)
GLUCOSE BLDC GLUCOMTR-MCNC: 118 MG/DL — HIGH (ref 70–99)
GLUCOSE BLDC GLUCOMTR-MCNC: 119 MG/DL — HIGH (ref 70–99)
GLUCOSE BLDC GLUCOMTR-MCNC: 120 MG/DL — HIGH (ref 70–99)
GLUCOSE BLDC GLUCOMTR-MCNC: 120 MG/DL — HIGH (ref 70–99)
GLUCOSE BLDC GLUCOMTR-MCNC: 122 MG/DL — HIGH (ref 70–99)
GLUCOSE BLDC GLUCOMTR-MCNC: 122 MG/DL — HIGH (ref 70–99)
GLUCOSE BLDC GLUCOMTR-MCNC: 124 MG/DL — HIGH (ref 70–99)
GLUCOSE BLDC GLUCOMTR-MCNC: 126 MG/DL — HIGH (ref 70–99)
GLUCOSE BLDC GLUCOMTR-MCNC: 127 MG/DL — HIGH (ref 70–99)
GLUCOSE BLDC GLUCOMTR-MCNC: 128 MG/DL — HIGH (ref 70–99)
GLUCOSE BLDC GLUCOMTR-MCNC: 130 MG/DL — HIGH (ref 70–99)
GLUCOSE BLDC GLUCOMTR-MCNC: 131 MG/DL — HIGH (ref 70–99)
GLUCOSE BLDC GLUCOMTR-MCNC: 132 MG/DL — HIGH (ref 70–99)
GLUCOSE BLDC GLUCOMTR-MCNC: 133 MG/DL — HIGH (ref 70–99)
GLUCOSE BLDC GLUCOMTR-MCNC: 134 MG/DL — HIGH (ref 70–99)
GLUCOSE BLDC GLUCOMTR-MCNC: 135 MG/DL — HIGH (ref 70–99)
GLUCOSE BLDC GLUCOMTR-MCNC: 136 MG/DL — HIGH (ref 70–99)
GLUCOSE BLDC GLUCOMTR-MCNC: 136 MG/DL — HIGH (ref 70–99)
GLUCOSE BLDC GLUCOMTR-MCNC: 137 MG/DL — HIGH (ref 70–99)
GLUCOSE BLDC GLUCOMTR-MCNC: 138 MG/DL — HIGH (ref 70–99)
GLUCOSE BLDC GLUCOMTR-MCNC: 138 MG/DL — HIGH (ref 70–99)
GLUCOSE BLDC GLUCOMTR-MCNC: 139 MG/DL — HIGH (ref 70–99)
GLUCOSE BLDC GLUCOMTR-MCNC: 140 MG/DL — HIGH (ref 70–99)
GLUCOSE BLDC GLUCOMTR-MCNC: 140 MG/DL — HIGH (ref 70–99)
GLUCOSE BLDC GLUCOMTR-MCNC: 141 MG/DL — HIGH (ref 70–99)
GLUCOSE BLDC GLUCOMTR-MCNC: 142 MG/DL — HIGH (ref 70–99)
GLUCOSE BLDC GLUCOMTR-MCNC: 143 MG/DL — HIGH (ref 70–99)
GLUCOSE BLDC GLUCOMTR-MCNC: 143 MG/DL — HIGH (ref 70–99)
GLUCOSE BLDC GLUCOMTR-MCNC: 144 MG/DL — HIGH (ref 70–99)
GLUCOSE BLDC GLUCOMTR-MCNC: 145 MG/DL — HIGH (ref 70–99)
GLUCOSE BLDC GLUCOMTR-MCNC: 145 MG/DL — HIGH (ref 70–99)
GLUCOSE BLDC GLUCOMTR-MCNC: 147 MG/DL — HIGH (ref 70–99)
GLUCOSE BLDC GLUCOMTR-MCNC: 147 MG/DL — HIGH (ref 70–99)
GLUCOSE BLDC GLUCOMTR-MCNC: 148 MG/DL — HIGH (ref 70–99)
GLUCOSE BLDC GLUCOMTR-MCNC: 148 MG/DL — HIGH (ref 70–99)
GLUCOSE BLDC GLUCOMTR-MCNC: 149 MG/DL — HIGH (ref 70–99)
GLUCOSE BLDC GLUCOMTR-MCNC: 149 MG/DL — HIGH (ref 70–99)
GLUCOSE BLDC GLUCOMTR-MCNC: 150 MG/DL — HIGH (ref 70–99)
GLUCOSE BLDC GLUCOMTR-MCNC: 150 MG/DL — HIGH (ref 70–99)
GLUCOSE BLDC GLUCOMTR-MCNC: 151 MG/DL — HIGH (ref 70–99)
GLUCOSE BLDC GLUCOMTR-MCNC: 151 MG/DL — HIGH (ref 70–99)
GLUCOSE BLDC GLUCOMTR-MCNC: 153 MG/DL — HIGH (ref 70–99)
GLUCOSE BLDC GLUCOMTR-MCNC: 155 MG/DL — HIGH (ref 70–99)
GLUCOSE BLDC GLUCOMTR-MCNC: 157 MG/DL — HIGH (ref 70–99)
GLUCOSE BLDC GLUCOMTR-MCNC: 158 MG/DL — HIGH (ref 70–99)
GLUCOSE BLDC GLUCOMTR-MCNC: 161 MG/DL — HIGH (ref 70–99)
GLUCOSE BLDC GLUCOMTR-MCNC: 162 MG/DL — HIGH (ref 70–99)
GLUCOSE BLDC GLUCOMTR-MCNC: 164 MG/DL — HIGH (ref 70–99)
GLUCOSE BLDC GLUCOMTR-MCNC: 165 MG/DL — HIGH (ref 70–99)
GLUCOSE BLDC GLUCOMTR-MCNC: 166 MG/DL — HIGH (ref 70–99)
GLUCOSE BLDC GLUCOMTR-MCNC: 166 MG/DL — HIGH (ref 70–99)
GLUCOSE BLDC GLUCOMTR-MCNC: 167 MG/DL — HIGH (ref 70–99)
GLUCOSE BLDC GLUCOMTR-MCNC: 169 MG/DL — HIGH (ref 70–99)
GLUCOSE BLDC GLUCOMTR-MCNC: 169 MG/DL — HIGH (ref 70–99)
GLUCOSE BLDC GLUCOMTR-MCNC: 170 MG/DL — HIGH (ref 70–99)
GLUCOSE BLDC GLUCOMTR-MCNC: 170 MG/DL — HIGH (ref 70–99)
GLUCOSE BLDC GLUCOMTR-MCNC: 172 MG/DL — HIGH (ref 70–99)
GLUCOSE BLDC GLUCOMTR-MCNC: 172 MG/DL — HIGH (ref 70–99)
GLUCOSE BLDC GLUCOMTR-MCNC: 173 MG/DL — HIGH (ref 70–99)
GLUCOSE BLDC GLUCOMTR-MCNC: 174 MG/DL — HIGH (ref 70–99)
GLUCOSE BLDC GLUCOMTR-MCNC: 174 MG/DL — HIGH (ref 70–99)
GLUCOSE BLDC GLUCOMTR-MCNC: 175 MG/DL — HIGH (ref 70–99)
GLUCOSE BLDC GLUCOMTR-MCNC: 176 MG/DL — HIGH (ref 70–99)
GLUCOSE BLDC GLUCOMTR-MCNC: 177 MG/DL — HIGH (ref 70–99)
GLUCOSE BLDC GLUCOMTR-MCNC: 179 MG/DL — HIGH (ref 70–99)
GLUCOSE BLDC GLUCOMTR-MCNC: 179 MG/DL — HIGH (ref 70–99)
GLUCOSE BLDC GLUCOMTR-MCNC: 180 MG/DL — HIGH (ref 70–99)
GLUCOSE BLDC GLUCOMTR-MCNC: 180 MG/DL — HIGH (ref 70–99)
GLUCOSE BLDC GLUCOMTR-MCNC: 182 MG/DL — HIGH (ref 70–99)
GLUCOSE BLDC GLUCOMTR-MCNC: 183 MG/DL — HIGH (ref 70–99)
GLUCOSE BLDC GLUCOMTR-MCNC: 185 MG/DL — HIGH (ref 70–99)
GLUCOSE BLDC GLUCOMTR-MCNC: 186 MG/DL — HIGH (ref 70–99)
GLUCOSE BLDC GLUCOMTR-MCNC: 186 MG/DL — HIGH (ref 70–99)
GLUCOSE BLDC GLUCOMTR-MCNC: 187 MG/DL — HIGH (ref 70–99)
GLUCOSE BLDC GLUCOMTR-MCNC: 187 MG/DL — HIGH (ref 70–99)
GLUCOSE BLDC GLUCOMTR-MCNC: 188 MG/DL — HIGH (ref 70–99)
GLUCOSE BLDC GLUCOMTR-MCNC: 188 MG/DL — HIGH (ref 70–99)
GLUCOSE BLDC GLUCOMTR-MCNC: 189 MG/DL — HIGH (ref 70–99)
GLUCOSE BLDC GLUCOMTR-MCNC: 190 MG/DL — HIGH (ref 70–99)
GLUCOSE BLDC GLUCOMTR-MCNC: 191 MG/DL — HIGH (ref 70–99)
GLUCOSE BLDC GLUCOMTR-MCNC: 193 MG/DL — HIGH (ref 70–99)
GLUCOSE BLDC GLUCOMTR-MCNC: 195 MG/DL — HIGH (ref 70–99)
GLUCOSE BLDC GLUCOMTR-MCNC: 195 MG/DL — HIGH (ref 70–99)
GLUCOSE BLDC GLUCOMTR-MCNC: 196 MG/DL — HIGH (ref 70–99)
GLUCOSE BLDC GLUCOMTR-MCNC: 197 MG/DL — HIGH (ref 70–99)
GLUCOSE BLDC GLUCOMTR-MCNC: 201 MG/DL — HIGH (ref 70–99)
GLUCOSE BLDC GLUCOMTR-MCNC: 202 MG/DL — HIGH (ref 70–99)
GLUCOSE BLDC GLUCOMTR-MCNC: 204 MG/DL — HIGH (ref 70–99)
GLUCOSE BLDC GLUCOMTR-MCNC: 205 MG/DL — HIGH (ref 70–99)
GLUCOSE BLDC GLUCOMTR-MCNC: 207 MG/DL — HIGH (ref 70–99)
GLUCOSE BLDC GLUCOMTR-MCNC: 208 MG/DL — HIGH (ref 70–99)
GLUCOSE BLDC GLUCOMTR-MCNC: 208 MG/DL — HIGH (ref 70–99)
GLUCOSE BLDC GLUCOMTR-MCNC: 209 MG/DL — HIGH (ref 70–99)
GLUCOSE BLDC GLUCOMTR-MCNC: 210 MG/DL — HIGH (ref 70–99)
GLUCOSE BLDC GLUCOMTR-MCNC: 211 MG/DL — HIGH (ref 70–99)
GLUCOSE BLDC GLUCOMTR-MCNC: 213 MG/DL — HIGH (ref 70–99)
GLUCOSE BLDC GLUCOMTR-MCNC: 214 MG/DL — HIGH (ref 70–99)
GLUCOSE BLDC GLUCOMTR-MCNC: 214 MG/DL — HIGH (ref 70–99)
GLUCOSE BLDC GLUCOMTR-MCNC: 217 MG/DL — HIGH (ref 70–99)
GLUCOSE BLDC GLUCOMTR-MCNC: 217 MG/DL — HIGH (ref 70–99)
GLUCOSE BLDC GLUCOMTR-MCNC: 219 MG/DL — HIGH (ref 70–99)
GLUCOSE BLDC GLUCOMTR-MCNC: 221 MG/DL — HIGH (ref 70–99)
GLUCOSE BLDC GLUCOMTR-MCNC: 221 MG/DL — HIGH (ref 70–99)
GLUCOSE BLDC GLUCOMTR-MCNC: 227 MG/DL — HIGH (ref 70–99)
GLUCOSE BLDC GLUCOMTR-MCNC: 227 MG/DL — HIGH (ref 70–99)
GLUCOSE BLDC GLUCOMTR-MCNC: 228 MG/DL — HIGH (ref 70–99)
GLUCOSE BLDC GLUCOMTR-MCNC: 233 MG/DL — HIGH (ref 70–99)
GLUCOSE BLDC GLUCOMTR-MCNC: 240 MG/DL — HIGH (ref 70–99)
GLUCOSE BLDC GLUCOMTR-MCNC: 240 MG/DL — HIGH (ref 70–99)
GLUCOSE BLDC GLUCOMTR-MCNC: 242 MG/DL — HIGH (ref 70–99)
GLUCOSE BLDC GLUCOMTR-MCNC: 243 MG/DL — HIGH (ref 70–99)
GLUCOSE BLDC GLUCOMTR-MCNC: 252 MG/DL — HIGH (ref 70–99)
GLUCOSE BLDC GLUCOMTR-MCNC: 254 MG/DL — HIGH (ref 70–99)
GLUCOSE BLDC GLUCOMTR-MCNC: 255 MG/DL — HIGH (ref 70–99)
GLUCOSE BLDC GLUCOMTR-MCNC: 255 MG/DL — HIGH (ref 70–99)
GLUCOSE BLDC GLUCOMTR-MCNC: 263 MG/DL — HIGH (ref 70–99)
GLUCOSE BLDC GLUCOMTR-MCNC: 265 MG/DL — HIGH (ref 70–99)
GLUCOSE BLDC GLUCOMTR-MCNC: 265 MG/DL — HIGH (ref 70–99)
GLUCOSE BLDC GLUCOMTR-MCNC: 268 MG/DL — HIGH (ref 70–99)
GLUCOSE BLDC GLUCOMTR-MCNC: 270 MG/DL — HIGH (ref 70–99)
GLUCOSE BLDC GLUCOMTR-MCNC: 278 MG/DL — HIGH (ref 70–99)
GLUCOSE BLDC GLUCOMTR-MCNC: 283 MG/DL — HIGH (ref 70–99)
GLUCOSE BLDC GLUCOMTR-MCNC: 286 MG/DL — HIGH (ref 70–99)
GLUCOSE BLDC GLUCOMTR-MCNC: 294 MG/DL — HIGH (ref 70–99)
GLUCOSE BLDC GLUCOMTR-MCNC: 297 MG/DL — HIGH (ref 70–99)
GLUCOSE BLDC GLUCOMTR-MCNC: 303 MG/DL — HIGH (ref 70–99)
GLUCOSE BLDC GLUCOMTR-MCNC: 310 MG/DL — HIGH (ref 70–99)
GLUCOSE BLDC GLUCOMTR-MCNC: 314 MG/DL — HIGH (ref 70–99)
GLUCOSE BLDC GLUCOMTR-MCNC: 314 MG/DL — HIGH (ref 70–99)
GLUCOSE BLDC GLUCOMTR-MCNC: 322 MG/DL — HIGH (ref 70–99)
GLUCOSE BLDC GLUCOMTR-MCNC: 328 MG/DL — HIGH (ref 70–99)
GLUCOSE BLDC GLUCOMTR-MCNC: 330 MG/DL — HIGH (ref 70–99)
GLUCOSE BLDC GLUCOMTR-MCNC: 360 MG/DL — HIGH (ref 70–99)
GLUCOSE BLDC GLUCOMTR-MCNC: 378 MG/DL — HIGH (ref 70–99)
GLUCOSE BLDC GLUCOMTR-MCNC: 54 MG/DL — CRITICAL LOW (ref 70–99)
GLUCOSE BLDC GLUCOMTR-MCNC: 55 MG/DL — LOW (ref 70–99)
GLUCOSE BLDC GLUCOMTR-MCNC: 67 MG/DL — LOW (ref 70–99)
GLUCOSE BLDV-MCNC: 115 MG/DL — HIGH (ref 70–99)
GLUCOSE BLDV-MCNC: 148 MG/DL — HIGH (ref 70–99)
GLUCOSE BLDV-MCNC: 175 MG/DL — HIGH (ref 70–99)
GLUCOSE BLDV-MCNC: 187 MG/DL — HIGH (ref 70–99)
GLUCOSE BLDV-MCNC: 241 MG/DL — HIGH (ref 70–99)
GLUCOSE BLDV-MCNC: 442 MG/DL — HIGH (ref 70–99)
GLUCOSE SERPL-MCNC: 115 MG/DL — HIGH (ref 70–99)
GLUCOSE SERPL-MCNC: 121 MG/DL — HIGH (ref 70–99)
GLUCOSE SERPL-MCNC: 122 MG/DL — HIGH (ref 70–99)
GLUCOSE SERPL-MCNC: 124 MG/DL — HIGH (ref 70–99)
GLUCOSE SERPL-MCNC: 124 MG/DL — HIGH (ref 70–99)
GLUCOSE SERPL-MCNC: 126 MG/DL — HIGH (ref 70–99)
GLUCOSE SERPL-MCNC: 128 MG/DL — HIGH (ref 70–99)
GLUCOSE SERPL-MCNC: 129 MG/DL — HIGH (ref 70–99)
GLUCOSE SERPL-MCNC: 129 MG/DL — HIGH (ref 70–99)
GLUCOSE SERPL-MCNC: 132 MG/DL — HIGH (ref 70–99)
GLUCOSE SERPL-MCNC: 135 MG/DL — HIGH (ref 70–99)
GLUCOSE SERPL-MCNC: 138 MG/DL — HIGH (ref 70–99)
GLUCOSE SERPL-MCNC: 140 MG/DL — HIGH (ref 70–99)
GLUCOSE SERPL-MCNC: 140 MG/DL — HIGH (ref 70–99)
GLUCOSE SERPL-MCNC: 141 MG/DL — HIGH (ref 70–99)
GLUCOSE SERPL-MCNC: 142 MG/DL — HIGH (ref 70–99)
GLUCOSE SERPL-MCNC: 142 MG/DL — HIGH (ref 70–99)
GLUCOSE SERPL-MCNC: 146 MG/DL — HIGH (ref 70–99)
GLUCOSE SERPL-MCNC: 147 MG/DL — HIGH (ref 70–99)
GLUCOSE SERPL-MCNC: 154 MG/DL — HIGH (ref 70–99)
GLUCOSE SERPL-MCNC: 157 MG/DL — HIGH (ref 70–99)
GLUCOSE SERPL-MCNC: 161 MG/DL — HIGH (ref 70–99)
GLUCOSE SERPL-MCNC: 169 MG/DL — HIGH (ref 70–99)
GLUCOSE SERPL-MCNC: 170 MG/DL — HIGH (ref 70–99)
GLUCOSE SERPL-MCNC: 170 MG/DL — HIGH (ref 70–99)
GLUCOSE SERPL-MCNC: 179 MG/DL — HIGH (ref 70–99)
GLUCOSE SERPL-MCNC: 185 MG/DL — HIGH (ref 70–99)
GLUCOSE SERPL-MCNC: 188 MG/DL — HIGH (ref 70–99)
GLUCOSE SERPL-MCNC: 191 MG/DL — HIGH (ref 70–99)
GLUCOSE SERPL-MCNC: 203 MG/DL — HIGH (ref 70–99)
GLUCOSE SERPL-MCNC: 206 MG/DL — HIGH (ref 70–99)
GLUCOSE SERPL-MCNC: 210 MG/DL — HIGH (ref 70–99)
GLUCOSE SERPL-MCNC: 214 MG/DL — HIGH (ref 70–99)
GLUCOSE SERPL-MCNC: 226 MG/DL — HIGH (ref 70–99)
GLUCOSE SERPL-MCNC: 233 MG/DL — HIGH (ref 70–99)
GLUCOSE SERPL-MCNC: 236 MG/DL — HIGH (ref 70–99)
GLUCOSE SERPL-MCNC: 240 MG/DL — HIGH (ref 70–99)
GLUCOSE SERPL-MCNC: 272 MG/DL — HIGH (ref 70–99)
GLUCOSE SERPL-MCNC: 305 MG/DL — HIGH (ref 70–99)
GLUCOSE SERPL-MCNC: 310 MG/DL — HIGH (ref 70–99)
GLUCOSE SERPL-MCNC: 382 MG/DL — HIGH (ref 70–99)
GLUCOSE SERPL-MCNC: 529 MG/DL — CRITICAL HIGH (ref 70–99)
GLUCOSE UR QL: NEGATIVE — SIGNIFICANT CHANGE UP
GRAM STN FLD: SIGNIFICANT CHANGE UP
HADV DNA SPEC QL NAA+PROBE: SIGNIFICANT CHANGE UP
HCO3 BLDA-SCNC: 26 MMOL/L — SIGNIFICANT CHANGE UP (ref 21–28)
HCO3 BLDV-SCNC: 18 MMOL/L — LOW (ref 22–29)
HCO3 BLDV-SCNC: 24 MMOL/L — SIGNIFICANT CHANGE UP (ref 22–29)
HCO3 BLDV-SCNC: 24 MMOL/L — SIGNIFICANT CHANGE UP (ref 22–29)
HCO3 BLDV-SCNC: 26 MMOL/L — SIGNIFICANT CHANGE UP (ref 22–29)
HCO3 BLDV-SCNC: 26 MMOL/L — SIGNIFICANT CHANGE UP (ref 22–29)
HCO3 BLDV-SCNC: 27 MMOL/L — SIGNIFICANT CHANGE UP (ref 22–29)
HCO3 BLDV-SCNC: 27 MMOL/L — SIGNIFICANT CHANGE UP (ref 22–29)
HCO3 BLDV-SCNC: 28 MMOL/L — SIGNIFICANT CHANGE UP (ref 22–29)
HCO3 BLDV-SCNC: 28 MMOL/L — SIGNIFICANT CHANGE UP (ref 22–29)
HCO3 BLDV-SCNC: 29 MMOL/L — SIGNIFICANT CHANGE UP (ref 22–29)
HCO3 BLDV-SCNC: 29 MMOL/L — SIGNIFICANT CHANGE UP (ref 22–29)
HCOV PNL SPEC NAA+PROBE: SIGNIFICANT CHANGE UP
HCT VFR BLD CALC: 15.8 % — CRITICAL LOW (ref 34.5–45)
HCT VFR BLD CALC: 19.7 % — CRITICAL LOW (ref 34.5–45)
HCT VFR BLD CALC: 24.2 % — LOW (ref 34.5–45)
HCT VFR BLD CALC: 25.2 % — LOW (ref 34.5–45)
HCT VFR BLD CALC: 26.4 % — LOW (ref 34.5–45)
HCT VFR BLD CALC: 26.7 % — LOW (ref 34.5–45)
HCT VFR BLD CALC: 27 % — LOW (ref 34.5–45)
HCT VFR BLD CALC: 27 % — LOW (ref 34.5–45)
HCT VFR BLD CALC: 27.1 % — LOW (ref 34.5–45)
HCT VFR BLD CALC: 27.2 % — LOW (ref 34.5–45)
HCT VFR BLD CALC: 27.3 % — LOW (ref 34.5–45)
HCT VFR BLD CALC: 27.9 % — LOW (ref 34.5–45)
HCT VFR BLD CALC: 28.1 % — LOW (ref 34.5–45)
HCT VFR BLD CALC: 29.1 % — LOW (ref 34.5–45)
HCT VFR BLD CALC: 29.2 % — LOW (ref 34.5–45)
HCT VFR BLD CALC: 29.3 % — LOW (ref 34.5–45)
HCT VFR BLD CALC: 29.4 % — LOW (ref 34.5–45)
HCT VFR BLD CALC: 29.4 % — LOW (ref 34.5–45)
HCT VFR BLD CALC: 29.5 % — LOW (ref 34.5–45)
HCT VFR BLD CALC: 29.6 % — LOW (ref 34.5–45)
HCT VFR BLD CALC: 29.7 % — LOW (ref 34.5–45)
HCT VFR BLD CALC: 30.2 % — LOW (ref 34.5–45)
HCT VFR BLD CALC: 30.3 % — LOW (ref 34.5–45)
HCT VFR BLD CALC: 30.4 % — LOW (ref 34.5–45)
HCT VFR BLD CALC: 30.6 % — LOW (ref 34.5–45)
HCT VFR BLD CALC: 30.6 % — LOW (ref 34.5–45)
HCT VFR BLD CALC: 30.7 % — LOW (ref 34.5–45)
HCT VFR BLD CALC: 30.8 % — LOW (ref 34.5–45)
HCT VFR BLD CALC: 31 % — LOW (ref 34.5–45)
HCT VFR BLD CALC: 31.2 % — LOW (ref 34.5–45)
HCT VFR BLD CALC: 31.4 % — LOW (ref 34.5–45)
HCT VFR BLD CALC: 31.8 % — LOW (ref 34.5–45)
HCT VFR BLD CALC: 31.9 % — LOW (ref 34.5–45)
HCT VFR BLD CALC: 32.3 % — LOW (ref 34.5–45)
HCT VFR BLD CALC: 32.3 % — LOW (ref 34.5–45)
HCT VFR BLD CALC: 32.4 % — LOW (ref 34.5–45)
HCT VFR BLD CALC: 32.5 % — LOW (ref 34.5–45)
HCT VFR BLD CALC: 32.6 % — LOW (ref 34.5–45)
HCT VFR BLD CALC: 34.3 % — LOW (ref 34.5–45)
HCT VFR BLD CALC: 35.5 % — SIGNIFICANT CHANGE UP (ref 34.5–45)
HCT VFR BLDA CALC: 26 % — LOW (ref 34.5–46.5)
HCT VFR BLDA CALC: 27 % — LOW (ref 34.5–46.5)
HCT VFR BLDA CALC: 27 % — LOW (ref 34.5–46.5)
HCT VFR BLDA CALC: 30 % — LOW (ref 34.5–46.5)
HCT VFR BLDA CALC: 31 % — LOW (ref 34.5–46.5)
HCT VFR BLDA CALC: 32 % — LOW (ref 34.5–46.5)
HGB BLD CALC-MCNC: 10.2 G/DL — LOW (ref 11.7–16.1)
HGB BLD CALC-MCNC: 10.6 G/DL — LOW (ref 11.7–16.1)
HGB BLD CALC-MCNC: 8.6 G/DL — LOW (ref 11.7–16.1)
HGB BLD CALC-MCNC: 8.9 G/DL — LOW (ref 11.7–16.1)
HGB BLD CALC-MCNC: 8.9 G/DL — LOW (ref 11.7–16.1)
HGB BLD CALC-MCNC: 9.9 G/DL — LOW (ref 11.7–16.1)
HGB BLD-MCNC: 10.1 G/DL — LOW (ref 11.5–15.5)
HGB BLD-MCNC: 10.1 G/DL — LOW (ref 11.5–15.5)
HGB BLD-MCNC: 10.6 G/DL — LOW (ref 11.5–15.5)
HGB BLD-MCNC: 4.3 G/DL — CRITICAL LOW (ref 11.5–15.5)
HGB BLD-MCNC: 5.6 G/DL — CRITICAL LOW (ref 11.5–15.5)
HGB BLD-MCNC: 7.2 G/DL — LOW (ref 11.5–15.5)
HGB BLD-MCNC: 7.3 G/DL — LOW (ref 11.5–15.5)
HGB BLD-MCNC: 7.8 G/DL — LOW (ref 11.5–15.5)
HGB BLD-MCNC: 7.9 G/DL — LOW (ref 11.5–15.5)
HGB BLD-MCNC: 8.1 G/DL — LOW (ref 11.5–15.5)
HGB BLD-MCNC: 8.2 G/DL — LOW (ref 11.5–15.5)
HGB BLD-MCNC: 8.3 G/DL — LOW (ref 11.5–15.5)
HGB BLD-MCNC: 8.6 G/DL — LOW (ref 11.5–15.5)
HGB BLD-MCNC: 8.6 G/DL — LOW (ref 11.5–15.5)
HGB BLD-MCNC: 8.7 G/DL — LOW (ref 11.5–15.5)
HGB BLD-MCNC: 8.7 G/DL — LOW (ref 11.5–15.5)
HGB BLD-MCNC: 8.8 G/DL — LOW (ref 11.5–15.5)
HGB BLD-MCNC: 8.9 G/DL — LOW (ref 11.5–15.5)
HGB BLD-MCNC: 8.9 G/DL — LOW (ref 11.5–15.5)
HGB BLD-MCNC: 9 G/DL — LOW (ref 11.5–15.5)
HGB BLD-MCNC: 9 G/DL — LOW (ref 11.5–15.5)
HGB BLD-MCNC: 9.1 G/DL — LOW (ref 11.5–15.5)
HGB BLD-MCNC: 9.1 G/DL — LOW (ref 11.5–15.5)
HGB BLD-MCNC: 9.2 G/DL — LOW (ref 11.5–15.5)
HGB BLD-MCNC: 9.2 G/DL — LOW (ref 11.5–15.5)
HGB BLD-MCNC: 9.3 G/DL — LOW (ref 11.5–15.5)
HGB BLD-MCNC: 9.4 G/DL — LOW (ref 11.5–15.5)
HGB BLD-MCNC: 9.5 G/DL — LOW (ref 11.5–15.5)
HGB BLD-MCNC: 9.6 G/DL — LOW (ref 11.5–15.5)
HGB BLD-MCNC: 9.7 G/DL — LOW (ref 11.5–15.5)
HGB BLD-MCNC: 9.7 G/DL — LOW (ref 11.5–15.5)
HMPV RNA SPEC QL NAA+PROBE: SIGNIFICANT CHANGE UP
HOROWITZ INDEX BLDV+IHG-RTO: 100 — SIGNIFICANT CHANGE UP
HOROWITZ INDEX BLDV+IHG-RTO: SIGNIFICANT CHANGE UP
HPIV1 RNA SPEC QL NAA+PROBE: SIGNIFICANT CHANGE UP
HPIV2 RNA SPEC QL NAA+PROBE: SIGNIFICANT CHANGE UP
HPIV3 RNA SPEC QL NAA+PROBE: SIGNIFICANT CHANGE UP
HPIV4 RNA SPEC QL NAA+PROBE: SIGNIFICANT CHANGE UP
HYALINE CASTS # UR AUTO: 3 /LPF — HIGH (ref 0–2)
HYALINE CASTS # UR AUTO: 3 /LPF — HIGH (ref 0–2)
IGA FLD-MCNC: 257 MG/DL — SIGNIFICANT CHANGE UP (ref 84–499)
IGG FLD-MCNC: 734 MG/DL — SIGNIFICANT CHANGE UP (ref 610–1660)
IGM SERPL-MCNC: 68 MG/DL — SIGNIFICANT CHANGE UP (ref 35–242)
IMM GRANULOCYTES NFR BLD AUTO: 0.8 % — SIGNIFICANT CHANGE UP (ref 0–1.5)
IMM GRANULOCYTES NFR BLD AUTO: 0.8 % — SIGNIFICANT CHANGE UP (ref 0–1.5)
IMM GRANULOCYTES NFR BLD AUTO: 0.9 % — SIGNIFICANT CHANGE UP (ref 0–1.5)
INR BLD: 0.98 RATIO — SIGNIFICANT CHANGE UP (ref 0.88–1.16)
INR BLD: 1 RATIO — SIGNIFICANT CHANGE UP (ref 0.88–1.16)
INR BLD: 1.06 RATIO — SIGNIFICANT CHANGE UP (ref 0.88–1.16)
INR BLD: 1.09 RATIO — SIGNIFICANT CHANGE UP (ref 0.88–1.16)
INR BLD: 1.11 RATIO — SIGNIFICANT CHANGE UP (ref 0.88–1.16)
INR BLD: 1.13 RATIO — SIGNIFICANT CHANGE UP (ref 0.88–1.16)
INR BLD: 1.2 RATIO — HIGH (ref 0.88–1.16)
INR BLD: 1.2 RATIO — HIGH (ref 0.88–1.16)
INR BLD: 1.41 RATIO — HIGH (ref 0.88–1.16)
INTERPRETATION SERPL IFE-IMP: SIGNIFICANT CHANGE UP
IRON SATN MFR SERPL: 14 % — SIGNIFICANT CHANGE UP (ref 14–50)
IRON SATN MFR SERPL: 40 UG/DL — SIGNIFICANT CHANGE UP (ref 30–160)
KAPPA LC SER QL IFE: 2.88 MG/DL — HIGH (ref 0.33–1.94)
KAPPA LC SER QL IFE: 2.88 MG/DL — HIGH (ref 0.33–1.94)
KAPPA/LAMBDA FREE LIGHT CHAIN RATIO, SERUM: 1.23 RATIO — SIGNIFICANT CHANGE UP (ref 0.26–1.65)
KAPPA/LAMBDA FREE LIGHT CHAIN RATIO, SERUM: 1.23 RATIO — SIGNIFICANT CHANGE UP (ref 0.26–1.65)
KETONES UR-MCNC: NEGATIVE — SIGNIFICANT CHANGE UP
LACTATE BLDV-MCNC: 1.7 MMOL/L — SIGNIFICANT CHANGE UP (ref 0.5–2)
LACTATE BLDV-MCNC: 10.6 MMOL/L — CRITICAL HIGH (ref 0.5–2)
LACTATE BLDV-MCNC: 2.3 MMOL/L — HIGH (ref 0.7–2)
LACTATE BLDV-MCNC: 2.5 MMOL/L — HIGH (ref 0.5–2)
LACTATE BLDV-MCNC: 2.6 MMOL/L — HIGH (ref 0.7–2)
LACTATE BLDV-MCNC: 3.1 MMOL/L — HIGH (ref 0.7–2)
LACTATE BLDV-MCNC: 3.5 MMOL/L — HIGH (ref 0.5–2)
LACTATE SERPL-SCNC: 3.5 MMOL/L — HIGH (ref 0.5–2)
LAMBDA LC SER QL IFE: 2.35 MG/DL — SIGNIFICANT CHANGE UP (ref 0.57–2.63)
LAMBDA LC SER QL IFE: 2.35 MG/DL — SIGNIFICANT CHANGE UP (ref 0.57–2.63)
LDH SERPL L TO P-CCNC: 262 U/L — HIGH (ref 50–242)
LEUKOCYTE ESTERASE UR-ACNC: ABNORMAL
LEUKOCYTE ESTERASE UR-ACNC: ABNORMAL
LEUKOCYTE ESTERASE UR-ACNC: NEGATIVE — SIGNIFICANT CHANGE UP
LIDOCAIN IGE QN: 50 U/L — SIGNIFICANT CHANGE UP (ref 7–60)
LYMPHOCYTES # BLD AUTO: 0.5 K/UL — LOW (ref 1–3.3)
LYMPHOCYTES # BLD AUTO: 0.84 K/UL — LOW (ref 1–3.3)
LYMPHOCYTES # BLD AUTO: 0.88 K/UL — LOW (ref 1–3.3)
LYMPHOCYTES # BLD AUTO: 1.28 K/UL — SIGNIFICANT CHANGE UP (ref 1–3.3)
LYMPHOCYTES # BLD AUTO: 1.39 K/UL — SIGNIFICANT CHANGE UP (ref 1–3.3)
LYMPHOCYTES # BLD AUTO: 1.6 K/UL — SIGNIFICANT CHANGE UP (ref 1–3.3)
LYMPHOCYTES # BLD AUTO: 1.83 K/UL — SIGNIFICANT CHANGE UP (ref 1–3.3)
LYMPHOCYTES # BLD AUTO: 11.6 % — LOW (ref 13–44)
LYMPHOCYTES # BLD AUTO: 20.5 % — SIGNIFICANT CHANGE UP (ref 13–44)
LYMPHOCYTES # BLD AUTO: 28.4 % — SIGNIFICANT CHANGE UP (ref 13–44)
LYMPHOCYTES # BLD AUTO: 35 % — SIGNIFICANT CHANGE UP (ref 13–44)
LYMPHOCYTES # BLD AUTO: 5.8 % — LOW (ref 13–44)
LYMPHOCYTES # BLD AUTO: 6.3 % — LOW (ref 13–44)
LYMPHOCYTES # BLD AUTO: 6.6 % — LOW (ref 13–44)
MACROCYTES BLD QL: SIGNIFICANT CHANGE UP
MAGNESIUM SERPL-MCNC: 1.7 MG/DL — SIGNIFICANT CHANGE UP (ref 1.6–2.6)
MAGNESIUM SERPL-MCNC: 1.8 MG/DL — SIGNIFICANT CHANGE UP (ref 1.6–2.6)
MAGNESIUM SERPL-MCNC: 1.8 MG/DL — SIGNIFICANT CHANGE UP (ref 1.6–2.6)
MAGNESIUM SERPL-MCNC: 1.9 MG/DL — SIGNIFICANT CHANGE UP (ref 1.6–2.6)
MAGNESIUM SERPL-MCNC: 2.1 MG/DL — SIGNIFICANT CHANGE UP (ref 1.6–2.6)
MAGNESIUM SERPL-MCNC: 2.2 MG/DL — SIGNIFICANT CHANGE UP (ref 1.6–2.6)
MAGNESIUM SERPL-MCNC: 2.2 MG/DL — SIGNIFICANT CHANGE UP (ref 1.6–2.6)
MAGNESIUM SERPL-MCNC: 2.3 MG/DL — SIGNIFICANT CHANGE UP (ref 1.6–2.6)
MAGNESIUM SERPL-MCNC: 3.1 MG/DL — HIGH (ref 1.6–2.6)
MAGNESIUM SERPL-MCNC: 3.3 MG/DL — HIGH (ref 1.6–2.6)
MANUAL SMEAR VERIFICATION: SIGNIFICANT CHANGE UP
MANUAL SMEAR VERIFICATION: SIGNIFICANT CHANGE UP
MCHC RBC-ENTMCNC: 27.1 GM/DL — LOW (ref 32–36)
MCHC RBC-ENTMCNC: 27.2 GM/DL — LOW (ref 32–36)
MCHC RBC-ENTMCNC: 28.2 PG — SIGNIFICANT CHANGE UP (ref 27–34)
MCHC RBC-ENTMCNC: 28.4 GM/DL — LOW (ref 32–36)
MCHC RBC-ENTMCNC: 28.4 PG — SIGNIFICANT CHANGE UP (ref 27–34)
MCHC RBC-ENTMCNC: 28.5 PG — SIGNIFICANT CHANGE UP (ref 27–34)
MCHC RBC-ENTMCNC: 28.6 GM/DL — LOW (ref 32–36)
MCHC RBC-ENTMCNC: 28.6 PG — SIGNIFICANT CHANGE UP (ref 27–34)
MCHC RBC-ENTMCNC: 28.7 PG — SIGNIFICANT CHANGE UP (ref 27–34)
MCHC RBC-ENTMCNC: 28.9 GM/DL — LOW (ref 32–36)
MCHC RBC-ENTMCNC: 28.9 PG — SIGNIFICANT CHANGE UP (ref 27–34)
MCHC RBC-ENTMCNC: 29 GM/DL — LOW (ref 32–36)
MCHC RBC-ENTMCNC: 29 GM/DL — LOW (ref 32–36)
MCHC RBC-ENTMCNC: 29 PG — SIGNIFICANT CHANGE UP (ref 27–34)
MCHC RBC-ENTMCNC: 29.1 PG — SIGNIFICANT CHANGE UP (ref 27–34)
MCHC RBC-ENTMCNC: 29.1 PG — SIGNIFICANT CHANGE UP (ref 27–34)
MCHC RBC-ENTMCNC: 29.2 GM/DL — LOW (ref 32–36)
MCHC RBC-ENTMCNC: 29.2 PG — SIGNIFICANT CHANGE UP (ref 27–34)
MCHC RBC-ENTMCNC: 29.3 GM/DL — LOW (ref 32–36)
MCHC RBC-ENTMCNC: 29.3 PG — SIGNIFICANT CHANGE UP (ref 27–34)
MCHC RBC-ENTMCNC: 29.4 GM/DL — LOW (ref 32–36)
MCHC RBC-ENTMCNC: 29.4 PG — SIGNIFICANT CHANGE UP (ref 27–34)
MCHC RBC-ENTMCNC: 29.5 PG — SIGNIFICANT CHANGE UP (ref 27–34)
MCHC RBC-ENTMCNC: 29.6 GM/DL — LOW (ref 32–36)
MCHC RBC-ENTMCNC: 29.6 GM/DL — LOW (ref 32–36)
MCHC RBC-ENTMCNC: 29.6 PG — SIGNIFICANT CHANGE UP (ref 27–34)
MCHC RBC-ENTMCNC: 29.7 GM/DL — LOW (ref 32–36)
MCHC RBC-ENTMCNC: 29.7 PG — SIGNIFICANT CHANGE UP (ref 27–34)
MCHC RBC-ENTMCNC: 29.7 PG — SIGNIFICANT CHANGE UP (ref 27–34)
MCHC RBC-ENTMCNC: 29.8 GM/DL — LOW (ref 32–36)
MCHC RBC-ENTMCNC: 29.8 PG — SIGNIFICANT CHANGE UP (ref 27–34)
MCHC RBC-ENTMCNC: 29.8 PG — SIGNIFICANT CHANGE UP (ref 27–34)
MCHC RBC-ENTMCNC: 29.9 GM/DL — LOW (ref 32–36)
MCHC RBC-ENTMCNC: 29.9 PG — SIGNIFICANT CHANGE UP (ref 27–34)
MCHC RBC-ENTMCNC: 29.9 PG — SIGNIFICANT CHANGE UP (ref 27–34)
MCHC RBC-ENTMCNC: 30 GM/DL — LOW (ref 32–36)
MCHC RBC-ENTMCNC: 30 GM/DL — LOW (ref 32–36)
MCHC RBC-ENTMCNC: 30.1 GM/DL — LOW (ref 32–36)
MCHC RBC-ENTMCNC: 30.1 PG — SIGNIFICANT CHANGE UP (ref 27–34)
MCHC RBC-ENTMCNC: 30.2 GM/DL — LOW (ref 32–36)
MCHC RBC-ENTMCNC: 30.3 GM/DL — LOW (ref 32–36)
MCHC RBC-ENTMCNC: 30.4 GM/DL — LOW (ref 32–36)
MCHC RBC-ENTMCNC: 30.4 PG — SIGNIFICANT CHANGE UP (ref 27–34)
MCHC RBC-ENTMCNC: 30.5 GM/DL — LOW (ref 32–36)
MCHC RBC-ENTMCNC: 30.5 PG — SIGNIFICANT CHANGE UP (ref 27–34)
MCHC RBC-ENTMCNC: 30.6 GM/DL — LOW (ref 32–36)
MCHC RBC-ENTMCNC: 30.7 GM/DL — LOW (ref 32–36)
MCHC RBC-ENTMCNC: 30.8 PG — SIGNIFICANT CHANGE UP (ref 27–34)
MCHC RBC-ENTMCNC: 30.9 GM/DL — LOW (ref 32–36)
MCHC RBC-ENTMCNC: 31 GM/DL — LOW (ref 32–36)
MCHC RBC-ENTMCNC: 31.1 GM/DL — LOW (ref 32–36)
MCHC RBC-ENTMCNC: 31.4 GM/DL — LOW (ref 32–36)
MCHC RBC-ENTMCNC: 31.6 GM/DL — LOW (ref 32–36)
MCHC RBC-ENTMCNC: 31.6 PG — SIGNIFICANT CHANGE UP (ref 27–34)
MCHC RBC-ENTMCNC: 31.8 GM/DL — LOW (ref 32–36)
MCHC RBC-ENTMCNC: 31.8 PG — SIGNIFICANT CHANGE UP (ref 27–34)
MCHC RBC-ENTMCNC: 31.9 PG — SIGNIFICANT CHANGE UP (ref 27–34)
MCHC RBC-ENTMCNC: 32 PG — SIGNIFICANT CHANGE UP (ref 27–34)
MCHC RBC-ENTMCNC: 32.2 PG — SIGNIFICANT CHANGE UP (ref 27–34)
MCHC RBC-ENTMCNC: 32.4 PG — SIGNIFICANT CHANGE UP (ref 27–34)
MCHC RBC-ENTMCNC: 32.4 PG — SIGNIFICANT CHANGE UP (ref 27–34)
MCHC RBC-ENTMCNC: 32.5 PG — SIGNIFICANT CHANGE UP (ref 27–34)
MCHC RBC-ENTMCNC: 32.6 PG — SIGNIFICANT CHANGE UP (ref 27–34)
MCHC RBC-ENTMCNC: 32.7 PG — SIGNIFICANT CHANGE UP (ref 27–34)
MCHC RBC-ENTMCNC: 32.9 PG — SIGNIFICANT CHANGE UP (ref 27–34)
MCHC RBC-ENTMCNC: 33 PG — SIGNIFICANT CHANGE UP (ref 27–34)
MCHC RBC-ENTMCNC: 33.2 PG — SIGNIFICANT CHANGE UP (ref 27–34)
MCHC RBC-ENTMCNC: 33.3 PG — SIGNIFICANT CHANGE UP (ref 27–34)
MCHC RBC-ENTMCNC: 34.1 PG — HIGH (ref 27–34)
MCV RBC AUTO: 100.7 FL — HIGH (ref 80–100)
MCV RBC AUTO: 101.1 FL — HIGH (ref 80–100)
MCV RBC AUTO: 101.2 FL — HIGH (ref 80–100)
MCV RBC AUTO: 101.7 FL — HIGH (ref 80–100)
MCV RBC AUTO: 102.7 FL — HIGH (ref 80–100)
MCV RBC AUTO: 103.5 FL — HIGH (ref 80–100)
MCV RBC AUTO: 103.6 FL — HIGH (ref 80–100)
MCV RBC AUTO: 104.8 FL — HIGH (ref 80–100)
MCV RBC AUTO: 105.2 FL — HIGH (ref 80–100)
MCV RBC AUTO: 105.2 FL — HIGH (ref 80–100)
MCV RBC AUTO: 105.3 FL — HIGH (ref 80–100)
MCV RBC AUTO: 106.2 FL — HIGH (ref 80–100)
MCV RBC AUTO: 106.8 FL — HIGH (ref 80–100)
MCV RBC AUTO: 107.1 FL — HIGH (ref 80–100)
MCV RBC AUTO: 107.1 FL — HIGH (ref 80–100)
MCV RBC AUTO: 107.6 FL — HIGH (ref 80–100)
MCV RBC AUTO: 108 FL — HIGH (ref 80–100)
MCV RBC AUTO: 108.1 FL — HIGH (ref 80–100)
MCV RBC AUTO: 108.3 FL — HIGH (ref 80–100)
MCV RBC AUTO: 108.5 FL — HIGH (ref 80–100)
MCV RBC AUTO: 109.1 FL — HIGH (ref 80–100)
MCV RBC AUTO: 110 FL — HIGH (ref 80–100)
MCV RBC AUTO: 110.2 FL — HIGH (ref 80–100)
MCV RBC AUTO: 110.3 FL — HIGH (ref 80–100)
MCV RBC AUTO: 112.9 FL — HIGH (ref 80–100)
MCV RBC AUTO: 114.1 FL — HIGH (ref 80–100)
MCV RBC AUTO: 116.2 FL — HIGH (ref 80–100)
MCV RBC AUTO: 95.9 FL — SIGNIFICANT CHANGE UP (ref 80–100)
MCV RBC AUTO: 96 FL — SIGNIFICANT CHANGE UP (ref 80–100)
MCV RBC AUTO: 96.4 FL — SIGNIFICANT CHANGE UP (ref 80–100)
MCV RBC AUTO: 97 FL — SIGNIFICANT CHANGE UP (ref 80–100)
MCV RBC AUTO: 97 FL — SIGNIFICANT CHANGE UP (ref 80–100)
MCV RBC AUTO: 97.1 FL — SIGNIFICANT CHANGE UP (ref 80–100)
MCV RBC AUTO: 97.1 FL — SIGNIFICANT CHANGE UP (ref 80–100)
MCV RBC AUTO: 97.5 FL — SIGNIFICANT CHANGE UP (ref 80–100)
MCV RBC AUTO: 97.6 FL — SIGNIFICANT CHANGE UP (ref 80–100)
MCV RBC AUTO: 98 FL — SIGNIFICANT CHANGE UP (ref 80–100)
MCV RBC AUTO: 98.1 FL — SIGNIFICANT CHANGE UP (ref 80–100)
MCV RBC AUTO: 98.2 FL — SIGNIFICANT CHANGE UP (ref 80–100)
MCV RBC AUTO: 98.6 FL — SIGNIFICANT CHANGE UP (ref 80–100)
MCV RBC AUTO: 99.4 FL — SIGNIFICANT CHANGE UP (ref 80–100)
MCV RBC AUTO: 99.6 FL — SIGNIFICANT CHANGE UP (ref 80–100)
MCV RBC AUTO: 99.7 FL — SIGNIFICANT CHANGE UP (ref 80–100)
MCV RBC AUTO: 99.7 FL — SIGNIFICANT CHANGE UP (ref 80–100)
METAMYELOCYTES # FLD: 7 % — HIGH (ref 0–0)
METHOD TYPE: SIGNIFICANT CHANGE UP
METHOD TYPE: SIGNIFICANT CHANGE UP
MICROCYTES BLD QL: SLIGHT — SIGNIFICANT CHANGE UP
MONOCYTES # BLD AUTO: 0.08 K/UL — SIGNIFICANT CHANGE UP (ref 0–0.9)
MONOCYTES # BLD AUTO: 0.09 K/UL — SIGNIFICANT CHANGE UP (ref 0–0.9)
MONOCYTES # BLD AUTO: 0.26 K/UL — SIGNIFICANT CHANGE UP (ref 0–0.9)
MONOCYTES # BLD AUTO: 0.44 K/UL — SIGNIFICANT CHANGE UP (ref 0–0.9)
MONOCYTES # BLD AUTO: 0.6 K/UL — SIGNIFICANT CHANGE UP (ref 0–0.9)
MONOCYTES # BLD AUTO: 0.81 K/UL — SIGNIFICANT CHANGE UP (ref 0–0.9)
MONOCYTES # BLD AUTO: 1.45 K/UL — HIGH (ref 0–0.9)
MONOCYTES NFR BLD AUTO: 1.1 % — LOW (ref 2–14)
MONOCYTES NFR BLD AUTO: 1.8 % — LOW (ref 2–14)
MONOCYTES NFR BLD AUTO: 11.9 % — SIGNIFICANT CHANGE UP (ref 2–14)
MONOCYTES NFR BLD AUTO: 13.1 % — SIGNIFICANT CHANGE UP (ref 2–14)
MONOCYTES NFR BLD AUTO: 2 % — SIGNIFICANT CHANGE UP (ref 2–14)
MONOCYTES NFR BLD AUTO: 4.3 % — SIGNIFICANT CHANGE UP (ref 2–14)
MONOCYTES NFR BLD AUTO: 6.9 % — SIGNIFICANT CHANGE UP (ref 2–14)
MRSA PCR RESULT.: SIGNIFICANT CHANGE UP
MRSA PCR RESULT.: SIGNIFICANT CHANGE UP
MYELOCYTES NFR BLD: 2 % — HIGH (ref 0–0)
NEUTROPHILS # BLD AUTO: 12.3 K/UL — HIGH (ref 1.8–7.4)
NEUTROPHILS # BLD AUTO: 13.18 K/UL — HIGH (ref 1.8–7.4)
NEUTROPHILS # BLD AUTO: 2.42 K/UL — SIGNIFICANT CHANGE UP (ref 1.8–7.4)
NEUTROPHILS # BLD AUTO: 3.88 K/UL — SIGNIFICANT CHANGE UP (ref 1.8–7.4)
NEUTROPHILS # BLD AUTO: 4.1 K/UL — SIGNIFICANT CHANGE UP (ref 1.8–7.4)
NEUTROPHILS # BLD AUTO: 6.95 K/UL — SIGNIFICANT CHANGE UP (ref 1.8–7.4)
NEUTROPHILS # BLD AUTO: 8.13 K/UL — HIGH (ref 1.8–7.4)
NEUTROPHILS NFR BLD AUTO: 52 % — SIGNIFICANT CHANGE UP (ref 43–77)
NEUTROPHILS NFR BLD AUTO: 60.3 % — SIGNIFICANT CHANGE UP (ref 43–77)
NEUTROPHILS NFR BLD AUTO: 60.5 % — SIGNIFICANT CHANGE UP (ref 43–77)
NEUTROPHILS NFR BLD AUTO: 73.4 % — SIGNIFICANT CHANGE UP (ref 43–77)
NEUTROPHILS NFR BLD AUTO: 88.3 % — HIGH (ref 43–77)
NEUTROPHILS NFR BLD AUTO: 91.1 % — HIGH (ref 43–77)
NEUTROPHILS NFR BLD AUTO: 91.4 % — HIGH (ref 43–77)
NEUTS BAND # BLD: 1 % — SIGNIFICANT CHANGE UP (ref 0–8)
NIGHT BLUE STAIN TISS: SIGNIFICANT CHANGE UP
NITRITE UR-MCNC: NEGATIVE — SIGNIFICANT CHANGE UP
NON-GYNECOLOGICAL CYTOLOGY STUDY: SIGNIFICANT CHANGE UP
NRBC # BLD: 0 /100 WBCS — SIGNIFICANT CHANGE UP (ref 0–0)
NRBC # BLD: 0 /100 — SIGNIFICANT CHANGE UP (ref 0–0)
NT-PROBNP SERPL-SCNC: 148 PG/ML — SIGNIFICANT CHANGE UP (ref 0–300)
NT-PROBNP SERPL-SCNC: 155 PG/ML — SIGNIFICANT CHANGE UP (ref 0–300)
NT-PROBNP SERPL-SCNC: 461 PG/ML — HIGH (ref 0–300)
OB PNL STL: POSITIVE
ORGANISM # SPEC MICROSCOPIC CNT: SIGNIFICANT CHANGE UP
OSMOLALITY SERPL: 288 MOSMOL/KG — SIGNIFICANT CHANGE UP (ref 280–301)
OSMOLALITY UR: 356 MOS/KG — SIGNIFICANT CHANGE UP (ref 300–900)
OVALOCYTES BLD QL SMEAR: SLIGHT — SIGNIFICANT CHANGE UP
PCO2 BLDA: 33 MMHG — SIGNIFICANT CHANGE UP (ref 32–45)
PCO2 BLDV: 44 MMHG — HIGH (ref 39–42)
PCO2 BLDV: 45 MMHG — HIGH (ref 39–42)
PCO2 BLDV: 46 MMHG — HIGH (ref 39–42)
PCO2 BLDV: 47 MMHG — HIGH (ref 39–42)
PCO2 BLDV: 48 MMHG — HIGH (ref 39–42)
PCO2 BLDV: 48 MMHG — HIGH (ref 39–42)
PCO2 BLDV: 51 MMHG — HIGH (ref 39–42)
PCO2 BLDV: 51 MMHG — HIGH (ref 39–42)
PCO2 BLDV: 53 MMHG — HIGH (ref 39–42)
PCO2 BLDV: 56 MMHG — HIGH (ref 39–42)
PCO2 BLDV: 56 MMHG — HIGH (ref 39–42)
PH BLDA: 7.51 — HIGH (ref 7.35–7.45)
PH BLDV: 7.12 — CRITICAL LOW (ref 7.32–7.43)
PH BLDV: 7.3 — LOW (ref 7.32–7.43)
PH BLDV: 7.31 — LOW (ref 7.32–7.43)
PH BLDV: 7.32 — SIGNIFICANT CHANGE UP (ref 7.32–7.43)
PH BLDV: 7.34 — SIGNIFICANT CHANGE UP (ref 7.32–7.43)
PH BLDV: 7.36 — SIGNIFICANT CHANGE UP (ref 7.32–7.43)
PH BLDV: 7.39 — SIGNIFICANT CHANGE UP (ref 7.32–7.43)
PH BLDV: 7.39 — SIGNIFICANT CHANGE UP (ref 7.32–7.43)
PH BLDV: 7.41 — SIGNIFICANT CHANGE UP (ref 7.32–7.43)
PH UR: 6.5 — SIGNIFICANT CHANGE UP (ref 5–8)
PH UR: 7 — SIGNIFICANT CHANGE UP (ref 5–8)
PH UR: 7 — SIGNIFICANT CHANGE UP (ref 5–8)
PHOSPHATE SERPL-MCNC: 2.4 MG/DL — LOW (ref 2.5–4.5)
PHOSPHATE SERPL-MCNC: 2.9 MG/DL — SIGNIFICANT CHANGE UP (ref 2.5–4.5)
PHOSPHATE SERPL-MCNC: 3.4 MG/DL — SIGNIFICANT CHANGE UP (ref 2.5–4.5)
PHOSPHATE SERPL-MCNC: 3.5 MG/DL — SIGNIFICANT CHANGE UP (ref 2.5–4.5)
PHOSPHATE SERPL-MCNC: 3.7 MG/DL — SIGNIFICANT CHANGE UP (ref 2.5–4.5)
PHOSPHATE SERPL-MCNC: 3.7 MG/DL — SIGNIFICANT CHANGE UP (ref 2.5–4.5)
PHOSPHATE SERPL-MCNC: 4.1 MG/DL — SIGNIFICANT CHANGE UP (ref 2.5–4.5)
PHOSPHATE SERPL-MCNC: 4.7 MG/DL — HIGH (ref 2.5–4.5)
PHOSPHATE SERPL-MCNC: 6.6 MG/DL — HIGH (ref 2.5–4.5)
PLAT MORPH BLD: ABNORMAL
PLAT MORPH BLD: NORMAL — SIGNIFICANT CHANGE UP
PLATELET # BLD AUTO: 103 K/UL — LOW (ref 150–400)
PLATELET # BLD AUTO: 112 K/UL — LOW (ref 150–400)
PLATELET # BLD AUTO: 119 K/UL — LOW (ref 150–400)
PLATELET # BLD AUTO: 125 K/UL — LOW (ref 150–400)
PLATELET # BLD AUTO: 129 K/UL — LOW (ref 150–400)
PLATELET # BLD AUTO: 136 K/UL — LOW (ref 150–400)
PLATELET # BLD AUTO: 140 K/UL — LOW (ref 150–400)
PLATELET # BLD AUTO: 179 K/UL — SIGNIFICANT CHANGE UP (ref 150–400)
PLATELET # BLD AUTO: 183 K/UL — SIGNIFICANT CHANGE UP (ref 150–400)
PLATELET # BLD AUTO: 191 K/UL — SIGNIFICANT CHANGE UP (ref 150–400)
PLATELET # BLD AUTO: 192 K/UL — SIGNIFICANT CHANGE UP (ref 150–400)
PLATELET # BLD AUTO: 193 K/UL — SIGNIFICANT CHANGE UP (ref 150–400)
PLATELET # BLD AUTO: 195 K/UL — SIGNIFICANT CHANGE UP (ref 150–400)
PLATELET # BLD AUTO: 204 K/UL — SIGNIFICANT CHANGE UP (ref 150–400)
PLATELET # BLD AUTO: 215 K/UL — SIGNIFICANT CHANGE UP (ref 150–400)
PLATELET # BLD AUTO: 222 K/UL — SIGNIFICANT CHANGE UP (ref 150–400)
PLATELET # BLD AUTO: 223 K/UL — SIGNIFICANT CHANGE UP (ref 150–400)
PLATELET # BLD AUTO: 227 K/UL — SIGNIFICANT CHANGE UP (ref 150–400)
PLATELET # BLD AUTO: 228 K/UL — SIGNIFICANT CHANGE UP (ref 150–400)
PLATELET # BLD AUTO: 228 K/UL — SIGNIFICANT CHANGE UP (ref 150–400)
PLATELET # BLD AUTO: 230 K/UL — SIGNIFICANT CHANGE UP (ref 150–400)
PLATELET # BLD AUTO: 231 K/UL — SIGNIFICANT CHANGE UP (ref 150–400)
PLATELET # BLD AUTO: 233 K/UL — SIGNIFICANT CHANGE UP (ref 150–400)
PLATELET # BLD AUTO: 236 K/UL — SIGNIFICANT CHANGE UP (ref 150–400)
PLATELET # BLD AUTO: 243 K/UL — SIGNIFICANT CHANGE UP (ref 150–400)
PLATELET # BLD AUTO: 245 K/UL — SIGNIFICANT CHANGE UP (ref 150–400)
PLATELET # BLD AUTO: 249 K/UL — SIGNIFICANT CHANGE UP (ref 150–400)
PLATELET # BLD AUTO: 252 K/UL — SIGNIFICANT CHANGE UP (ref 150–400)
PLATELET # BLD AUTO: 256 K/UL — SIGNIFICANT CHANGE UP (ref 150–400)
PLATELET # BLD AUTO: 257 K/UL — SIGNIFICANT CHANGE UP (ref 150–400)
PLATELET # BLD AUTO: 266 K/UL — SIGNIFICANT CHANGE UP (ref 150–400)
PLATELET # BLD AUTO: 272 K/UL — SIGNIFICANT CHANGE UP (ref 150–400)
PLATELET # BLD AUTO: 277 K/UL — SIGNIFICANT CHANGE UP (ref 150–400)
PLATELET # BLD AUTO: 278 K/UL — SIGNIFICANT CHANGE UP (ref 150–400)
PLATELET # BLD AUTO: 296 K/UL — SIGNIFICANT CHANGE UP (ref 150–400)
PLATELET # BLD AUTO: 298 K/UL — SIGNIFICANT CHANGE UP (ref 150–400)
PLATELET # BLD AUTO: 308 K/UL — SIGNIFICANT CHANGE UP (ref 150–400)
PLATELET # BLD AUTO: 312 K/UL — SIGNIFICANT CHANGE UP (ref 150–400)
PLATELET # BLD AUTO: 318 K/UL — SIGNIFICANT CHANGE UP (ref 150–400)
PLATELET # BLD AUTO: 337 K/UL — SIGNIFICANT CHANGE UP (ref 150–400)
PLATELET # BLD AUTO: SIGNIFICANT CHANGE UP (ref 150–400)
PLATELET # BLD AUTO: SIGNIFICANT CHANGE UP (ref 150–400)
PO2 BLDA: 158 MMHG — HIGH (ref 83–108)
PO2 BLDV: 22 MMHG — LOW (ref 25–45)
PO2 BLDV: 45 MMHG — SIGNIFICANT CHANGE UP (ref 25–45)
PO2 BLDV: 45 MMHG — SIGNIFICANT CHANGE UP (ref 25–45)
PO2 BLDV: 47 MMHG — HIGH (ref 25–45)
PO2 BLDV: 48 MMHG — HIGH (ref 25–45)
PO2 BLDV: 48 MMHG — HIGH (ref 25–45)
PO2 BLDV: 50 MMHG — HIGH (ref 25–45)
PO2 BLDV: 54 MMHG — HIGH (ref 25–45)
PO2 BLDV: 55 MMHG — HIGH (ref 25–45)
PO2 BLDV: 57 MMHG — HIGH (ref 25–45)
PO2 BLDV: 75 MMHG — HIGH (ref 25–45)
POLYCHROMASIA BLD QL SMEAR: SLIGHT — SIGNIFICANT CHANGE UP
POTASSIUM BLDV-SCNC: 4.1 MMOL/L — SIGNIFICANT CHANGE UP (ref 3.5–5.1)
POTASSIUM BLDV-SCNC: 4.7 MMOL/L — SIGNIFICANT CHANGE UP (ref 3.5–5.1)
POTASSIUM BLDV-SCNC: 4.8 MMOL/L — SIGNIFICANT CHANGE UP (ref 3.5–5.1)
POTASSIUM BLDV-SCNC: 5 MMOL/L — SIGNIFICANT CHANGE UP (ref 3.5–5.1)
POTASSIUM BLDV-SCNC: 5.1 MMOL/L — SIGNIFICANT CHANGE UP (ref 3.5–5.1)
POTASSIUM BLDV-SCNC: 5.2 MMOL/L — HIGH (ref 3.5–5.1)
POTASSIUM SERPL-MCNC: 3.1 MMOL/L — LOW (ref 3.5–5.3)
POTASSIUM SERPL-MCNC: 3.4 MMOL/L — LOW (ref 3.5–5.3)
POTASSIUM SERPL-MCNC: 3.6 MMOL/L — SIGNIFICANT CHANGE UP (ref 3.5–5.3)
POTASSIUM SERPL-MCNC: 4 MMOL/L — SIGNIFICANT CHANGE UP (ref 3.5–5.3)
POTASSIUM SERPL-MCNC: 4.1 MMOL/L — SIGNIFICANT CHANGE UP (ref 3.5–5.3)
POTASSIUM SERPL-MCNC: 4.2 MMOL/L — SIGNIFICANT CHANGE UP (ref 3.5–5.3)
POTASSIUM SERPL-MCNC: 4.3 MMOL/L — SIGNIFICANT CHANGE UP (ref 3.5–5.3)
POTASSIUM SERPL-MCNC: 4.4 MMOL/L — SIGNIFICANT CHANGE UP (ref 3.5–5.3)
POTASSIUM SERPL-MCNC: 4.5 MMOL/L — SIGNIFICANT CHANGE UP (ref 3.5–5.3)
POTASSIUM SERPL-MCNC: 4.6 MMOL/L — SIGNIFICANT CHANGE UP (ref 3.5–5.3)
POTASSIUM SERPL-MCNC: 4.7 MMOL/L — SIGNIFICANT CHANGE UP (ref 3.5–5.3)
POTASSIUM SERPL-MCNC: 4.9 MMOL/L — SIGNIFICANT CHANGE UP (ref 3.5–5.3)
POTASSIUM SERPL-MCNC: 5 MMOL/L — SIGNIFICANT CHANGE UP (ref 3.5–5.3)
POTASSIUM SERPL-MCNC: 5 MMOL/L — SIGNIFICANT CHANGE UP (ref 3.5–5.3)
POTASSIUM SERPL-MCNC: 5.1 MMOL/L — SIGNIFICANT CHANGE UP (ref 3.5–5.3)
POTASSIUM SERPL-MCNC: 5.3 MMOL/L — SIGNIFICANT CHANGE UP (ref 3.5–5.3)
POTASSIUM SERPL-MCNC: 5.7 MMOL/L — HIGH (ref 3.5–5.3)
POTASSIUM SERPL-MCNC: 5.8 MMOL/L — HIGH (ref 3.5–5.3)
POTASSIUM SERPL-MCNC: 6.3 MMOL/L — CRITICAL HIGH (ref 3.5–5.3)
POTASSIUM SERPL-SCNC: 3.1 MMOL/L — LOW (ref 3.5–5.3)
POTASSIUM SERPL-SCNC: 3.4 MMOL/L — LOW (ref 3.5–5.3)
POTASSIUM SERPL-SCNC: 3.6 MMOL/L — SIGNIFICANT CHANGE UP (ref 3.5–5.3)
POTASSIUM SERPL-SCNC: 4 MMOL/L — SIGNIFICANT CHANGE UP (ref 3.5–5.3)
POTASSIUM SERPL-SCNC: 4.1 MMOL/L — SIGNIFICANT CHANGE UP (ref 3.5–5.3)
POTASSIUM SERPL-SCNC: 4.2 MMOL/L — SIGNIFICANT CHANGE UP (ref 3.5–5.3)
POTASSIUM SERPL-SCNC: 4.3 MMOL/L — SIGNIFICANT CHANGE UP (ref 3.5–5.3)
POTASSIUM SERPL-SCNC: 4.4 MMOL/L — SIGNIFICANT CHANGE UP (ref 3.5–5.3)
POTASSIUM SERPL-SCNC: 4.5 MMOL/L — SIGNIFICANT CHANGE UP (ref 3.5–5.3)
POTASSIUM SERPL-SCNC: 4.6 MMOL/L — SIGNIFICANT CHANGE UP (ref 3.5–5.3)
POTASSIUM SERPL-SCNC: 4.7 MMOL/L — SIGNIFICANT CHANGE UP (ref 3.5–5.3)
POTASSIUM SERPL-SCNC: 4.9 MMOL/L — SIGNIFICANT CHANGE UP (ref 3.5–5.3)
POTASSIUM SERPL-SCNC: 5 MMOL/L — SIGNIFICANT CHANGE UP (ref 3.5–5.3)
POTASSIUM SERPL-SCNC: 5 MMOL/L — SIGNIFICANT CHANGE UP (ref 3.5–5.3)
POTASSIUM SERPL-SCNC: 5.1 MMOL/L — SIGNIFICANT CHANGE UP (ref 3.5–5.3)
POTASSIUM SERPL-SCNC: 5.3 MMOL/L — SIGNIFICANT CHANGE UP (ref 3.5–5.3)
POTASSIUM SERPL-SCNC: 5.7 MMOL/L — HIGH (ref 3.5–5.3)
POTASSIUM SERPL-SCNC: 5.8 MMOL/L — HIGH (ref 3.5–5.3)
POTASSIUM SERPL-SCNC: 6.3 MMOL/L — CRITICAL HIGH (ref 3.5–5.3)
POTASSIUM UR-SCNC: 44 MMOL/L — SIGNIFICANT CHANGE UP
PROCALCITONIN SERPL-MCNC: 0.23 NG/ML — HIGH (ref 0.02–0.1)
PROCALCITONIN SERPL-MCNC: 0.4 NG/ML — HIGH (ref 0.02–0.1)
PROMYELOCYTES # FLD: 2.6 % — HIGH (ref 0–0)
PROT ?TM UR-MCNC: 15 MG/DL — HIGH (ref 0–12)
PROT PATTERN SERPL ELPH-IMP: SIGNIFICANT CHANGE UP
PROT SERPL-MCNC: 3.2 G/DL — LOW (ref 6–8.3)
PROT SERPL-MCNC: 4.8 G/DL — LOW (ref 6–8.3)
PROT SERPL-MCNC: 6 G/DL — SIGNIFICANT CHANGE UP (ref 6–8.3)
PROT SERPL-MCNC: 6 G/DL — SIGNIFICANT CHANGE UP (ref 6–8.3)
PROT SERPL-MCNC: 6.3 G/DL — SIGNIFICANT CHANGE UP (ref 6–8.3)
PROT SERPL-MCNC: 6.3 G/DL — SIGNIFICANT CHANGE UP (ref 6–8.3)
PROT SERPL-MCNC: 6.5 G/DL — SIGNIFICANT CHANGE UP (ref 6–8.3)
PROT SERPL-MCNC: 6.7 G/DL — SIGNIFICANT CHANGE UP (ref 6–8.3)
PROT SERPL-MCNC: 6.8 G/DL — SIGNIFICANT CHANGE UP (ref 6–8.3)
PROT SERPL-MCNC: 6.9 G/DL — SIGNIFICANT CHANGE UP (ref 6–8.3)
PROT SERPL-MCNC: 6.9 G/DL — SIGNIFICANT CHANGE UP (ref 6–8.3)
PROT SERPL-MCNC: 7 G/DL — SIGNIFICANT CHANGE UP (ref 6–8.3)
PROT SERPL-MCNC: 7 G/DL — SIGNIFICANT CHANGE UP (ref 6–8.3)
PROT SERPL-MCNC: 7.1 G/DL — SIGNIFICANT CHANGE UP (ref 6–8.3)
PROT SERPL-MCNC: 7.3 G/DL — SIGNIFICANT CHANGE UP (ref 6–8.3)
PROT SERPL-MCNC: 7.5 G/DL — SIGNIFICANT CHANGE UP (ref 6–8.3)
PROT SERPL-MCNC: 7.6 G/DL — SIGNIFICANT CHANGE UP (ref 6–8.3)
PROT SERPL-MCNC: 7.7 G/DL — SIGNIFICANT CHANGE UP (ref 6–8.3)
PROT UR-MCNC: 100 — SIGNIFICANT CHANGE UP
PROT UR-MCNC: ABNORMAL
PROT UR-MCNC: NEGATIVE — SIGNIFICANT CHANGE UP
PROT/CREAT UR-RTO: 0.5 RATIO — HIGH (ref 0–0.2)
PROTHROM AB SERPL-ACNC: 11.3 SEC — SIGNIFICANT CHANGE UP (ref 10.5–13.4)
PROTHROM AB SERPL-ACNC: 12 SEC — SIGNIFICANT CHANGE UP (ref 10.6–13.6)
PROTHROM AB SERPL-ACNC: 12.6 SEC — SIGNIFICANT CHANGE UP (ref 10.5–13.4)
PROTHROM AB SERPL-ACNC: 12.7 SEC — SIGNIFICANT CHANGE UP (ref 10.6–13.6)
PROTHROM AB SERPL-ACNC: 12.8 SEC — SIGNIFICANT CHANGE UP (ref 10.5–13.4)
PROTHROM AB SERPL-ACNC: 13 SEC — SIGNIFICANT CHANGE UP (ref 10.5–13.4)
PROTHROM AB SERPL-ACNC: 13.8 SEC — HIGH (ref 10.5–13.4)
PROTHROM AB SERPL-ACNC: 13.9 SEC — HIGH (ref 10.5–13.4)
PROTHROM AB SERPL-ACNC: 16.4 SEC — HIGH (ref 10.5–13.4)
RAPID RVP RESULT: SIGNIFICANT CHANGE UP
RBC # BLD: 1.36 M/UL — LOW (ref 3.8–5.2)
RBC # BLD: 1.84 M/UL — LOW (ref 3.8–5.2)
RBC # BLD: 2.47 M/UL — LOW (ref 3.8–5.2)
RBC # BLD: 2.47 M/UL — LOW (ref 3.8–5.2)
RBC # BLD: 2.53 M/UL — LOW (ref 3.8–5.2)
RBC # BLD: 2.63 M/UL — LOW (ref 3.8–5.2)
RBC # BLD: 2.64 M/UL — LOW (ref 3.8–5.2)
RBC # BLD: 2.67 M/UL — LOW (ref 3.8–5.2)
RBC # BLD: 2.69 M/UL — LOW (ref 3.8–5.2)
RBC # BLD: 2.7 M/UL — LOW (ref 3.8–5.2)
RBC # BLD: 2.73 M/UL — LOW (ref 3.8–5.2)
RBC # BLD: 2.75 M/UL — LOW (ref 3.8–5.2)
RBC # BLD: 2.76 M/UL — LOW (ref 3.8–5.2)
RBC # BLD: 2.76 M/UL — LOW (ref 3.8–5.2)
RBC # BLD: 2.77 M/UL — LOW (ref 3.8–5.2)
RBC # BLD: 2.78 M/UL — LOW (ref 3.8–5.2)
RBC # BLD: 2.8 M/UL — LOW (ref 3.8–5.2)
RBC # BLD: 2.81 M/UL — LOW (ref 3.8–5.2)
RBC # BLD: 2.81 M/UL — LOW (ref 3.8–5.2)
RBC # BLD: 2.82 M/UL — LOW (ref 3.8–5.2)
RBC # BLD: 2.84 M/UL — LOW (ref 3.8–5.2)
RBC # BLD: 2.86 M/UL — LOW (ref 3.8–5.2)
RBC # BLD: 2.88 M/UL — LOW (ref 3.8–5.2)
RBC # BLD: 2.89 M/UL — LOW (ref 3.8–5.2)
RBC # BLD: 2.89 M/UL — LOW (ref 3.8–5.2)
RBC # BLD: 2.91 M/UL — LOW (ref 3.8–5.2)
RBC # BLD: 2.92 M/UL — LOW (ref 3.8–5.2)
RBC # BLD: 2.92 M/UL — LOW (ref 3.8–5.2)
RBC # BLD: 2.94 M/UL — LOW (ref 3.8–5.2)
RBC # BLD: 2.96 M/UL — LOW (ref 3.8–5.2)
RBC # BLD: 2.98 M/UL — LOW (ref 3.8–5.2)
RBC # BLD: 2.99 M/UL — LOW (ref 3.8–5.2)
RBC # BLD: 3.01 M/UL — LOW (ref 3.8–5.2)
RBC # BLD: 3.02 M/UL — LOW (ref 3.8–5.2)
RBC # BLD: 3.03 M/UL — LOW (ref 3.8–5.2)
RBC # BLD: 3.03 M/UL — LOW (ref 3.8–5.2)
RBC # BLD: 3.11 M/UL — LOW (ref 3.8–5.2)
RBC # BLD: 3.15 M/UL — LOW (ref 3.8–5.2)
RBC # BLD: 3.21 M/UL — LOW (ref 3.8–5.2)
RBC # BLD: 3.28 M/UL — LOW (ref 3.8–5.2)
RBC # BLD: 3.32 M/UL — LOW (ref 3.8–5.2)
RBC # BLD: 3.45 M/UL — LOW (ref 3.8–5.2)
RBC # FLD: 14.9 % — HIGH (ref 10.3–14.5)
RBC # FLD: 15.1 % — HIGH (ref 10.3–14.5)
RBC # FLD: 15.2 % — HIGH (ref 10.3–14.5)
RBC # FLD: 15.3 % — HIGH (ref 10.3–14.5)
RBC # FLD: 15.4 % — HIGH (ref 10.3–14.5)
RBC # FLD: 15.5 % — HIGH (ref 10.3–14.5)
RBC # FLD: 15.8 % — HIGH (ref 10.3–14.5)
RBC # FLD: 15.9 % — HIGH (ref 10.3–14.5)
RBC # FLD: 15.9 % — HIGH (ref 10.3–14.5)
RBC # FLD: 16.2 % — HIGH (ref 10.3–14.5)
RBC # FLD: 16.3 % — HIGH (ref 10.3–14.5)
RBC # FLD: 16.4 % — HIGH (ref 10.3–14.5)
RBC # FLD: 16.4 % — HIGH (ref 10.3–14.5)
RBC # FLD: 16.5 % — HIGH (ref 10.3–14.5)
RBC # FLD: 16.5 % — HIGH (ref 10.3–14.5)
RBC # FLD: 16.6 % — HIGH (ref 10.3–14.5)
RBC # FLD: 16.6 % — HIGH (ref 10.3–14.5)
RBC # FLD: 16.7 % — HIGH (ref 10.3–14.5)
RBC # FLD: 16.8 % — HIGH (ref 10.3–14.5)
RBC # FLD: 16.8 % — HIGH (ref 10.3–14.5)
RBC # FLD: 16.9 % — HIGH (ref 10.3–14.5)
RBC # FLD: 17 % — HIGH (ref 10.3–14.5)
RBC # FLD: 17 % — HIGH (ref 10.3–14.5)
RBC # FLD: 17.1 % — HIGH (ref 10.3–14.5)
RBC # FLD: 17.2 % — HIGH (ref 10.3–14.5)
RBC # FLD: 17.2 % — HIGH (ref 10.3–14.5)
RBC # FLD: 17.4 % — HIGH (ref 10.3–14.5)
RBC # FLD: 17.6 % — HIGH (ref 10.3–14.5)
RBC # FLD: 17.7 % — HIGH (ref 10.3–14.5)
RBC # FLD: 17.7 % — HIGH (ref 10.3–14.5)
RBC # FLD: 17.8 % — HIGH (ref 10.3–14.5)
RBC # FLD: 17.9 % — HIGH (ref 10.3–14.5)
RBC BLD AUTO: ABNORMAL
RBC BLD AUTO: SIGNIFICANT CHANGE UP
RBC CASTS # UR COMP ASSIST: 5 /HPF — HIGH (ref 0–4)
RBC CASTS # UR COMP ASSIST: 8 /HPF — HIGH (ref 0–4)
RETICS #: 70.6 K/UL — SIGNIFICANT CHANGE UP (ref 25–125)
RETICS/RBC NFR: 2.9 % — HIGH (ref 0.5–2.5)
RH IG SCN BLD-IMP: POSITIVE — SIGNIFICANT CHANGE UP
RSV RNA SPEC QL NAA+PROBE: SIGNIFICANT CHANGE UP
RV+EV RNA SPEC QL NAA+PROBE: SIGNIFICANT CHANGE UP
S AUREUS DNA NOSE QL NAA+PROBE: SIGNIFICANT CHANGE UP
S AUREUS DNA NOSE QL NAA+PROBE: SIGNIFICANT CHANGE UP
SAO2 % BLDA: 100 % — HIGH (ref 94–98)
SAO2 % BLDV: 22.2 % — LOW (ref 67–88)
SAO2 % BLDV: 73 % — SIGNIFICANT CHANGE UP (ref 67–88)
SAO2 % BLDV: 73.4 % — SIGNIFICANT CHANGE UP (ref 67–88)
SAO2 % BLDV: 76.2 % — SIGNIFICANT CHANGE UP (ref 67–88)
SAO2 % BLDV: 78.2 % — SIGNIFICANT CHANGE UP (ref 67–88)
SAO2 % BLDV: 79 % — SIGNIFICANT CHANGE UP (ref 67–88)
SAO2 % BLDV: 80 % — SIGNIFICANT CHANGE UP (ref 67–88)
SAO2 % BLDV: 83.9 % — SIGNIFICANT CHANGE UP (ref 67–88)
SAO2 % BLDV: 86.9 % — SIGNIFICANT CHANGE UP (ref 67–88)
SAO2 % BLDV: 88.1 % — HIGH (ref 67–88)
SAO2 % BLDV: 94.6 % — HIGH (ref 67–88)
SARS-COV-2 RNA SPEC QL NAA+PROBE: DETECTED
SARS-COV-2 RNA SPEC QL NAA+PROBE: SIGNIFICANT CHANGE UP
SODIUM SERPL-SCNC: 130 MMOL/L — LOW (ref 135–145)
SODIUM SERPL-SCNC: 131 MMOL/L — LOW (ref 135–145)
SODIUM SERPL-SCNC: 132 MMOL/L — LOW (ref 135–145)
SODIUM SERPL-SCNC: 132 MMOL/L — LOW (ref 135–145)
SODIUM SERPL-SCNC: 134 MMOL/L — LOW (ref 135–145)
SODIUM SERPL-SCNC: 135 MMOL/L — SIGNIFICANT CHANGE UP (ref 135–145)
SODIUM SERPL-SCNC: 136 MMOL/L — SIGNIFICANT CHANGE UP (ref 135–145)
SODIUM SERPL-SCNC: 137 MMOL/L — SIGNIFICANT CHANGE UP (ref 135–145)
SODIUM SERPL-SCNC: 137 MMOL/L — SIGNIFICANT CHANGE UP (ref 135–145)
SODIUM SERPL-SCNC: 138 MMOL/L — SIGNIFICANT CHANGE UP (ref 135–145)
SODIUM SERPL-SCNC: 138 MMOL/L — SIGNIFICANT CHANGE UP (ref 135–145)
SODIUM SERPL-SCNC: 139 MMOL/L — SIGNIFICANT CHANGE UP (ref 135–145)
SODIUM SERPL-SCNC: 140 MMOL/L — SIGNIFICANT CHANGE UP (ref 135–145)
SODIUM SERPL-SCNC: 141 MMOL/L — SIGNIFICANT CHANGE UP (ref 135–145)
SODIUM SERPL-SCNC: 141 MMOL/L — SIGNIFICANT CHANGE UP (ref 135–145)
SODIUM SERPL-SCNC: 142 MMOL/L — SIGNIFICANT CHANGE UP (ref 135–145)
SODIUM SERPL-SCNC: 143 MMOL/L — SIGNIFICANT CHANGE UP (ref 135–145)
SODIUM SERPL-SCNC: 144 MMOL/L — SIGNIFICANT CHANGE UP (ref 135–145)
SODIUM SERPL-SCNC: 145 MMOL/L — SIGNIFICANT CHANGE UP (ref 135–145)
SODIUM SERPL-SCNC: 145 MMOL/L — SIGNIFICANT CHANGE UP (ref 135–145)
SODIUM SERPL-SCNC: 151 MMOL/L — HIGH (ref 135–145)
SODIUM UR-SCNC: 35 MMOL/L — SIGNIFICANT CHANGE UP
SP GR SPEC: 1.01 — SIGNIFICANT CHANGE UP (ref 1.01–1.02)
SP GR SPEC: 1.02 — SIGNIFICANT CHANGE UP (ref 1.01–1.02)
SP GR SPEC: >1.05 (ref 1.01–1.02)
SPECIMEN SOURCE: SIGNIFICANT CHANGE UP
T4 FREE SERPL-MCNC: 1.4 NG/DL — SIGNIFICANT CHANGE UP (ref 0.9–1.8)
T4 FREE SERPL-MCNC: 1.8 NG/DL — SIGNIFICANT CHANGE UP (ref 0.9–1.8)
TIBC SERPL-MCNC: 294 UG/DL — SIGNIFICANT CHANGE UP (ref 220–430)
TROPONIN T, HIGH SENSITIVITY RESULT: 21 NG/L — SIGNIFICANT CHANGE UP (ref 0–51)
TROPONIN T, HIGH SENSITIVITY RESULT: 21 NG/L — SIGNIFICANT CHANGE UP (ref 0–51)
TROPONIN T, HIGH SENSITIVITY RESULT: 32 NG/L — SIGNIFICANT CHANGE UP (ref 0–51)
TROPONIN T, HIGH SENSITIVITY RESULT: 32 NG/L — SIGNIFICANT CHANGE UP (ref 0–51)
TROPONIN T, HIGH SENSITIVITY RESULT: 55 NG/L — HIGH (ref 0–51)
TSH SERPL-MCNC: 0.3 UIU/ML — SIGNIFICANT CHANGE UP (ref 0.27–4.2)
TSH SERPL-MCNC: 0.32 UIU/ML — SIGNIFICANT CHANGE UP (ref 0.27–4.2)
TSH SERPL-MCNC: 2.39 UIU/ML — SIGNIFICANT CHANGE UP (ref 0.27–4.2)
UIBC SERPL-MCNC: 254 UG/DL — SIGNIFICANT CHANGE UP (ref 110–370)
UROBILINOGEN FLD QL: NEGATIVE — SIGNIFICANT CHANGE UP
UUN UR-MCNC: 553 MG/DL — SIGNIFICANT CHANGE UP
VANCOMYCIN TROUGH SERPL-MCNC: 11.8 UG/ML — SIGNIFICANT CHANGE UP (ref 10–20)
VANCOMYCIN TROUGH SERPL-MCNC: 11.9 UG/ML — SIGNIFICANT CHANGE UP (ref 10–20)
VANCOMYCIN TROUGH SERPL-MCNC: 14.2 UG/ML — SIGNIFICANT CHANGE UP (ref 10–20)
VANCOMYCIN TROUGH SERPL-MCNC: 16.1 UG/ML — SIGNIFICANT CHANGE UP (ref 10–20)
VANCOMYCIN TROUGH SERPL-MCNC: 16.1 UG/ML — SIGNIFICANT CHANGE UP (ref 10–20)
VANCOMYCIN TROUGH SERPL-MCNC: 17.1 UG/ML — SIGNIFICANT CHANGE UP (ref 10–20)
VANCOMYCIN TROUGH SERPL-MCNC: 17.3 UG/ML — SIGNIFICANT CHANGE UP (ref 10–20)
VANCOMYCIN TROUGH SERPL-MCNC: 17.4 UG/ML — SIGNIFICANT CHANGE UP (ref 10–20)
VANCOMYCIN TROUGH SERPL-MCNC: 17.6 UG/ML — SIGNIFICANT CHANGE UP (ref 10–20)
VANCOMYCIN TROUGH SERPL-MCNC: 17.7 UG/ML — SIGNIFICANT CHANGE UP (ref 10–20)
VANCOMYCIN TROUGH SERPL-MCNC: 17.9 UG/ML — SIGNIFICANT CHANGE UP (ref 10–20)
VANCOMYCIN TROUGH SERPL-MCNC: 20.9 UG/ML — HIGH (ref 10–20)
VANCOMYCIN TROUGH SERPL-MCNC: 22.1 UG/ML — HIGH (ref 10–20)
VANCOMYCIN TROUGH SERPL-MCNC: 9.2 UG/ML — LOW (ref 10–20)
VARIANT LYMPHS # BLD: 0.9 % — SIGNIFICANT CHANGE UP (ref 0–6)
VIT B12 SERPL-MCNC: >2000 PG/ML — HIGH (ref 232–1245)
VIT B12 SERPL-MCNC: >2000 PG/ML — HIGH (ref 232–1245)
WBC # BLD: 10.03 K/UL — SIGNIFICANT CHANGE UP (ref 3.8–10.5)
WBC # BLD: 10.82 K/UL — HIGH (ref 3.8–10.5)
WBC # BLD: 11.07 K/UL — HIGH (ref 3.8–10.5)
WBC # BLD: 11.4 K/UL — HIGH (ref 3.8–10.5)
WBC # BLD: 12.76 K/UL — HIGH (ref 3.8–10.5)
WBC # BLD: 13.93 K/UL — HIGH (ref 3.8–10.5)
WBC # BLD: 14.46 K/UL — HIGH (ref 3.8–10.5)
WBC # BLD: 14.65 K/UL — HIGH (ref 3.8–10.5)
WBC # BLD: 2.7 K/UL — LOW (ref 3.8–10.5)
WBC # BLD: 20.1 K/UL — HIGH (ref 3.8–10.5)
WBC # BLD: 4.56 K/UL — SIGNIFICANT CHANGE UP (ref 3.8–10.5)
WBC # BLD: 4.87 K/UL — SIGNIFICANT CHANGE UP (ref 3.8–10.5)
WBC # BLD: 4.93 K/UL — SIGNIFICANT CHANGE UP (ref 3.8–10.5)
WBC # BLD: 5.05 K/UL — SIGNIFICANT CHANGE UP (ref 3.8–10.5)
WBC # BLD: 5.06 K/UL — SIGNIFICANT CHANGE UP (ref 3.8–10.5)
WBC # BLD: 5.34 K/UL — SIGNIFICANT CHANGE UP (ref 3.8–10.5)
WBC # BLD: 5.38 K/UL — SIGNIFICANT CHANGE UP (ref 3.8–10.5)
WBC # BLD: 5.38 K/UL — SIGNIFICANT CHANGE UP (ref 3.8–10.5)
WBC # BLD: 5.66 K/UL — SIGNIFICANT CHANGE UP (ref 3.8–10.5)
WBC # BLD: 5.7 K/UL — SIGNIFICANT CHANGE UP (ref 3.8–10.5)
WBC # BLD: 5.91 K/UL — SIGNIFICANT CHANGE UP (ref 3.8–10.5)
WBC # BLD: 5.94 K/UL — SIGNIFICANT CHANGE UP (ref 3.8–10.5)
WBC # BLD: 6 K/UL — SIGNIFICANT CHANGE UP (ref 3.8–10.5)
WBC # BLD: 6.25 K/UL — SIGNIFICANT CHANGE UP (ref 3.8–10.5)
WBC # BLD: 6.33 K/UL — SIGNIFICANT CHANGE UP (ref 3.8–10.5)
WBC # BLD: 6.44 K/UL — SIGNIFICANT CHANGE UP (ref 3.8–10.5)
WBC # BLD: 6.44 K/UL — SIGNIFICANT CHANGE UP (ref 3.8–10.5)
WBC # BLD: 6.5 K/UL — SIGNIFICANT CHANGE UP (ref 3.8–10.5)
WBC # BLD: 6.5 K/UL — SIGNIFICANT CHANGE UP (ref 3.8–10.5)
WBC # BLD: 6.77 K/UL — SIGNIFICANT CHANGE UP (ref 3.8–10.5)
WBC # BLD: 6.78 K/UL — SIGNIFICANT CHANGE UP (ref 3.8–10.5)
WBC # BLD: 7.15 K/UL — SIGNIFICANT CHANGE UP (ref 3.8–10.5)
WBC # BLD: 7.53 K/UL — SIGNIFICANT CHANGE UP (ref 3.8–10.5)
WBC # BLD: 7.6 K/UL — SIGNIFICANT CHANGE UP (ref 3.8–10.5)
WBC # BLD: 7.95 K/UL — SIGNIFICANT CHANGE UP (ref 3.8–10.5)
WBC # BLD: 8.08 K/UL — SIGNIFICANT CHANGE UP (ref 3.8–10.5)
WBC # BLD: 8.39 K/UL — SIGNIFICANT CHANGE UP (ref 3.8–10.5)
WBC # BLD: 8.62 K/UL — SIGNIFICANT CHANGE UP (ref 3.8–10.5)
WBC # BLD: 8.8 K/UL — SIGNIFICANT CHANGE UP (ref 3.8–10.5)
WBC # BLD: 8.97 K/UL — SIGNIFICANT CHANGE UP (ref 3.8–10.5)
WBC # BLD: 9.07 K/UL — SIGNIFICANT CHANGE UP (ref 3.8–10.5)
WBC # BLD: 9.12 K/UL — SIGNIFICANT CHANGE UP (ref 3.8–10.5)
WBC # BLD: 9.55 K/UL — SIGNIFICANT CHANGE UP (ref 3.8–10.5)
WBC # BLD: 9.83 K/UL — SIGNIFICANT CHANGE UP (ref 3.8–10.5)
WBC # FLD AUTO: 10.03 K/UL — SIGNIFICANT CHANGE UP (ref 3.8–10.5)
WBC # FLD AUTO: 10.82 K/UL — HIGH (ref 3.8–10.5)
WBC # FLD AUTO: 11.07 K/UL — HIGH (ref 3.8–10.5)
WBC # FLD AUTO: 11.4 K/UL — HIGH (ref 3.8–10.5)
WBC # FLD AUTO: 12.76 K/UL — HIGH (ref 3.8–10.5)
WBC # FLD AUTO: 13.93 K/UL — HIGH (ref 3.8–10.5)
WBC # FLD AUTO: 14.46 K/UL — HIGH (ref 3.8–10.5)
WBC # FLD AUTO: 14.65 K/UL — HIGH (ref 3.8–10.5)
WBC # FLD AUTO: 2.7 K/UL — LOW (ref 3.8–10.5)
WBC # FLD AUTO: 20.1 K/UL — HIGH (ref 3.8–10.5)
WBC # FLD AUTO: 4.56 K/UL — SIGNIFICANT CHANGE UP (ref 3.8–10.5)
WBC # FLD AUTO: 4.87 K/UL — SIGNIFICANT CHANGE UP (ref 3.8–10.5)
WBC # FLD AUTO: 4.93 K/UL — SIGNIFICANT CHANGE UP (ref 3.8–10.5)
WBC # FLD AUTO: 5.05 K/UL — SIGNIFICANT CHANGE UP (ref 3.8–10.5)
WBC # FLD AUTO: 5.06 K/UL — SIGNIFICANT CHANGE UP (ref 3.8–10.5)
WBC # FLD AUTO: 5.34 K/UL — SIGNIFICANT CHANGE UP (ref 3.8–10.5)
WBC # FLD AUTO: 5.38 K/UL — SIGNIFICANT CHANGE UP (ref 3.8–10.5)
WBC # FLD AUTO: 5.38 K/UL — SIGNIFICANT CHANGE UP (ref 3.8–10.5)
WBC # FLD AUTO: 5.66 K/UL — SIGNIFICANT CHANGE UP (ref 3.8–10.5)
WBC # FLD AUTO: 5.7 K/UL — SIGNIFICANT CHANGE UP (ref 3.8–10.5)
WBC # FLD AUTO: 5.91 K/UL — SIGNIFICANT CHANGE UP (ref 3.8–10.5)
WBC # FLD AUTO: 5.94 K/UL — SIGNIFICANT CHANGE UP (ref 3.8–10.5)
WBC # FLD AUTO: 6 K/UL — SIGNIFICANT CHANGE UP (ref 3.8–10.5)
WBC # FLD AUTO: 6.25 K/UL — SIGNIFICANT CHANGE UP (ref 3.8–10.5)
WBC # FLD AUTO: 6.33 K/UL — SIGNIFICANT CHANGE UP (ref 3.8–10.5)
WBC # FLD AUTO: 6.44 K/UL — SIGNIFICANT CHANGE UP (ref 3.8–10.5)
WBC # FLD AUTO: 6.44 K/UL — SIGNIFICANT CHANGE UP (ref 3.8–10.5)
WBC # FLD AUTO: 6.5 K/UL — SIGNIFICANT CHANGE UP (ref 3.8–10.5)
WBC # FLD AUTO: 6.5 K/UL — SIGNIFICANT CHANGE UP (ref 3.8–10.5)
WBC # FLD AUTO: 6.77 K/UL — SIGNIFICANT CHANGE UP (ref 3.8–10.5)
WBC # FLD AUTO: 6.78 K/UL — SIGNIFICANT CHANGE UP (ref 3.8–10.5)
WBC # FLD AUTO: 7.15 K/UL — SIGNIFICANT CHANGE UP (ref 3.8–10.5)
WBC # FLD AUTO: 7.53 K/UL — SIGNIFICANT CHANGE UP (ref 3.8–10.5)
WBC # FLD AUTO: 7.6 K/UL — SIGNIFICANT CHANGE UP (ref 3.8–10.5)
WBC # FLD AUTO: 7.95 K/UL — SIGNIFICANT CHANGE UP (ref 3.8–10.5)
WBC # FLD AUTO: 8.08 K/UL — SIGNIFICANT CHANGE UP (ref 3.8–10.5)
WBC # FLD AUTO: 8.39 K/UL — SIGNIFICANT CHANGE UP (ref 3.8–10.5)
WBC # FLD AUTO: 8.62 K/UL — SIGNIFICANT CHANGE UP (ref 3.8–10.5)
WBC # FLD AUTO: 8.8 K/UL — SIGNIFICANT CHANGE UP (ref 3.8–10.5)
WBC # FLD AUTO: 8.97 K/UL — SIGNIFICANT CHANGE UP (ref 3.8–10.5)
WBC # FLD AUTO: 9.07 K/UL — SIGNIFICANT CHANGE UP (ref 3.8–10.5)
WBC # FLD AUTO: 9.12 K/UL — SIGNIFICANT CHANGE UP (ref 3.8–10.5)
WBC # FLD AUTO: 9.55 K/UL — SIGNIFICANT CHANGE UP (ref 3.8–10.5)
WBC # FLD AUTO: 9.83 K/UL — SIGNIFICANT CHANGE UP (ref 3.8–10.5)
WBC UR QL: 1 /HPF — SIGNIFICANT CHANGE UP (ref 0–5)
WBC UR QL: 1145 /HPF — HIGH (ref 0–5)

## 2022-01-01 PROCEDURE — 85730 THROMBOPLASTIN TIME PARTIAL: CPT

## 2022-01-01 PROCEDURE — 93005 ELECTROCARDIOGRAM TRACING: CPT

## 2022-01-01 PROCEDURE — 85025 COMPLETE CBC W/AUTO DIFF WBC: CPT

## 2022-01-01 PROCEDURE — 82565 ASSAY OF CREATININE: CPT

## 2022-01-01 PROCEDURE — 83605 ASSAY OF LACTIC ACID: CPT

## 2022-01-01 PROCEDURE — 49450 REPLACE G/C TUBE PERC: CPT

## 2022-01-01 PROCEDURE — 84295 ASSAY OF SERUM SODIUM: CPT

## 2022-01-01 PROCEDURE — 96375 TX/PRO/DX INJ NEW DRUG ADDON: CPT

## 2022-01-01 PROCEDURE — 71045 X-RAY EXAM CHEST 1 VIEW: CPT | Mod: 26

## 2022-01-01 PROCEDURE — 99232 SBSQ HOSP IP/OBS MODERATE 35: CPT

## 2022-01-01 PROCEDURE — 86334 IMMUNOFIX E-PHORESIS SERUM: CPT

## 2022-01-01 PROCEDURE — 97161 PT EVAL LOW COMPLEX 20 MIN: CPT

## 2022-01-01 PROCEDURE — 71045 X-RAY EXAM CHEST 1 VIEW: CPT | Mod: 26,77

## 2022-01-01 PROCEDURE — 71045 X-RAY EXAM CHEST 1 VIEW: CPT

## 2022-01-01 PROCEDURE — 82803 BLOOD GASES ANY COMBINATION: CPT

## 2022-01-01 PROCEDURE — 86900 BLOOD TYPING SEROLOGIC ABO: CPT

## 2022-01-01 PROCEDURE — 82435 ASSAY OF BLOOD CHLORIDE: CPT

## 2022-01-01 PROCEDURE — 97110 THERAPEUTIC EXERCISES: CPT

## 2022-01-01 PROCEDURE — 87641 MR-STAPH DNA AMP PROBE: CPT

## 2022-01-01 PROCEDURE — 93306 TTE W/DOPPLER COMPLETE: CPT

## 2022-01-01 PROCEDURE — 94640 AIRWAY INHALATION TREATMENT: CPT

## 2022-01-01 PROCEDURE — U0005: CPT

## 2022-01-01 PROCEDURE — 85610 PROTHROMBIN TIME: CPT

## 2022-01-01 PROCEDURE — 93971 EXTREMITY STUDY: CPT | Mod: 26,LT

## 2022-01-01 PROCEDURE — 99221 1ST HOSP IP/OBS SF/LOW 40: CPT

## 2022-01-01 PROCEDURE — C1751: CPT

## 2022-01-01 PROCEDURE — 84133 ASSAY OF URINE POTASSIUM: CPT

## 2022-01-01 PROCEDURE — 87086 URINE CULTURE/COLONY COUNT: CPT

## 2022-01-01 PROCEDURE — 84156 ASSAY OF PROTEIN URINE: CPT

## 2022-01-01 PROCEDURE — 99222 1ST HOSP IP/OBS MODERATE 55: CPT

## 2022-01-01 PROCEDURE — 82330 ASSAY OF CALCIUM: CPT

## 2022-01-01 PROCEDURE — 73060 X-RAY EXAM OF HUMERUS: CPT | Mod: 26,RT

## 2022-01-01 PROCEDURE — 82784 ASSAY IGA/IGD/IGG/IGM EACH: CPT

## 2022-01-01 PROCEDURE — 87040 BLOOD CULTURE FOR BACTERIA: CPT

## 2022-01-01 PROCEDURE — 93010 ELECTROCARDIOGRAM REPORT: CPT

## 2022-01-01 PROCEDURE — 80048 BASIC METABOLIC PNL TOTAL CA: CPT

## 2022-01-01 PROCEDURE — 80076 HEPATIC FUNCTION PANEL: CPT

## 2022-01-01 PROCEDURE — 99285 EMERGENCY DEPT VISIT HI MDM: CPT | Mod: 25

## 2022-01-01 PROCEDURE — 49465 FLUORO EXAM OF G/COLON TUBE: CPT

## 2022-01-01 PROCEDURE — 85014 HEMATOCRIT: CPT

## 2022-01-01 PROCEDURE — 80202 ASSAY OF VANCOMYCIN: CPT

## 2022-01-01 PROCEDURE — 87116 MYCOBACTERIA CULTURE: CPT

## 2022-01-01 PROCEDURE — 36556 INSERT NON-TUNNEL CV CATH: CPT

## 2022-01-01 PROCEDURE — 84155 ASSAY OF PROTEIN SERUM: CPT

## 2022-01-01 PROCEDURE — 84100 ASSAY OF PHOSPHORUS: CPT

## 2022-01-01 PROCEDURE — 86901 BLOOD TYPING SEROLOGIC RH(D): CPT

## 2022-01-01 PROCEDURE — 76080 X-RAY EXAM OF FISTULA: CPT

## 2022-01-01 PROCEDURE — 87070 CULTURE OTHR SPECIMN AEROBIC: CPT

## 2022-01-01 PROCEDURE — 93306 TTE W/DOPPLER COMPLETE: CPT | Mod: 26

## 2022-01-01 PROCEDURE — L8699: CPT

## 2022-01-01 PROCEDURE — 85027 COMPLETE CBC AUTOMATED: CPT

## 2022-01-01 PROCEDURE — 74177 CT ABD & PELVIS W/CONTRAST: CPT

## 2022-01-01 PROCEDURE — 87186 SC STD MICRODIL/AGAR DIL: CPT

## 2022-01-01 PROCEDURE — 97163 PT EVAL HIGH COMPLEX 45 MIN: CPT

## 2022-01-01 PROCEDURE — U0003: CPT

## 2022-01-01 PROCEDURE — 82962 GLUCOSE BLOOD TEST: CPT

## 2022-01-01 PROCEDURE — 76700 US EXAM ABDOM COMPLETE: CPT

## 2022-01-01 PROCEDURE — 93970 EXTREMITY STUDY: CPT

## 2022-01-01 PROCEDURE — 86300 IMMUNOASSAY TUMOR CA 15-3: CPT

## 2022-01-01 PROCEDURE — 71275 CT ANGIOGRAPHY CHEST: CPT | Mod: MA

## 2022-01-01 PROCEDURE — 99223 1ST HOSP IP/OBS HIGH 75: CPT

## 2022-01-01 PROCEDURE — 85018 HEMOGLOBIN: CPT

## 2022-01-01 PROCEDURE — 99222 1ST HOSP IP/OBS MODERATE 55: CPT | Mod: GC

## 2022-01-01 PROCEDURE — 88112 CYTOPATH CELL ENHANCE TECH: CPT

## 2022-01-01 PROCEDURE — 74177 CT ABD & PELVIS W/CONTRAST: CPT | Mod: 26,MD

## 2022-01-01 PROCEDURE — 83036 HEMOGLOBIN GLYCOSYLATED A1C: CPT

## 2022-01-01 PROCEDURE — 96374 THER/PROPH/DIAG INJ IV PUSH: CPT

## 2022-01-01 PROCEDURE — 88305 TISSUE EXAM BY PATHOLOGIST: CPT | Mod: 26

## 2022-01-01 PROCEDURE — 83690 ASSAY OF LIPASE: CPT

## 2022-01-01 PROCEDURE — 87206 SMEAR FLUORESCENT/ACID STAI: CPT

## 2022-01-01 PROCEDURE — 84540 ASSAY OF URINE/UREA-N: CPT

## 2022-01-01 PROCEDURE — 80053 COMPREHEN METABOLIC PANEL: CPT

## 2022-01-01 PROCEDURE — 87449 NOS EACH ORGANISM AG IA: CPT

## 2022-01-01 PROCEDURE — 82947 ASSAY GLUCOSE BLOOD QUANT: CPT

## 2022-01-01 PROCEDURE — 49424 ASSESS CYST CONTRAST INJECT: CPT

## 2022-01-01 PROCEDURE — 99291 CRITICAL CARE FIRST HOUR: CPT

## 2022-01-01 PROCEDURE — 36415 COLL VENOUS BLD VENIPUNCTURE: CPT

## 2022-01-01 PROCEDURE — 87015 SPECIMEN INFECT AGNT CONCNTJ: CPT

## 2022-01-01 PROCEDURE — 84443 ASSAY THYROID STIM HORMONE: CPT

## 2022-01-01 PROCEDURE — 71250 CT THORAX DX C-: CPT

## 2022-01-01 PROCEDURE — 84132 ASSAY OF SERUM POTASSIUM: CPT

## 2022-01-01 PROCEDURE — 83930 ASSAY OF BLOOD OSMOLALITY: CPT

## 2022-01-01 PROCEDURE — 94660 CPAP INITIATION&MGMT: CPT

## 2022-01-01 PROCEDURE — C1769: CPT

## 2022-01-01 PROCEDURE — 81001 URINALYSIS AUTO W/SCOPE: CPT

## 2022-01-01 PROCEDURE — 36620 INSERTION CATHETER ARTERY: CPT

## 2022-01-01 PROCEDURE — 99231 SBSQ HOSP IP/OBS SF/LOW 25: CPT

## 2022-01-01 PROCEDURE — C1887: CPT

## 2022-01-01 PROCEDURE — 70450 CT HEAD/BRAIN W/O DYE: CPT | Mod: MA

## 2022-01-01 PROCEDURE — C1729: CPT

## 2022-01-01 PROCEDURE — 92526 ORAL FUNCTION THERAPY: CPT

## 2022-01-01 PROCEDURE — 71250 CT THORAX DX C-: CPT | Mod: 26

## 2022-01-01 PROCEDURE — 88305 TISSUE EXAM BY PATHOLOGIST: CPT

## 2022-01-01 PROCEDURE — 99497 ADVNCD CARE PLAN 30 MIN: CPT | Mod: 25

## 2022-01-01 PROCEDURE — 87077 CULTURE AEROBIC IDENTIFY: CPT

## 2022-01-01 PROCEDURE — 84484 ASSAY OF TROPONIN QUANT: CPT

## 2022-01-01 PROCEDURE — 87640 STAPH A DNA AMP PROBE: CPT

## 2022-01-01 PROCEDURE — 87150 DNA/RNA AMPLIFIED PROBE: CPT

## 2022-01-01 PROCEDURE — 82570 ASSAY OF URINE CREATININE: CPT

## 2022-01-01 PROCEDURE — 82746 ASSAY OF FOLIC ACID SERUM: CPT

## 2022-01-01 PROCEDURE — 83880 ASSAY OF NATRIURETIC PEPTIDE: CPT

## 2022-01-01 PROCEDURE — 84300 ASSAY OF URINE SODIUM: CPT

## 2022-01-01 PROCEDURE — 82607 VITAMIN B-12: CPT

## 2022-01-01 PROCEDURE — 84165 PROTEIN E-PHORESIS SERUM: CPT

## 2022-01-01 PROCEDURE — 76937 US GUIDE VASCULAR ACCESS: CPT | Mod: 26

## 2022-01-01 PROCEDURE — 74176 CT ABD & PELVIS W/O CONTRAST: CPT

## 2022-01-01 PROCEDURE — C8929: CPT

## 2022-01-01 PROCEDURE — 73060 X-RAY EXAM OF HUMERUS: CPT

## 2022-01-01 PROCEDURE — 49406 IMAGE CATH FLUID PERI/RETRO: CPT

## 2022-01-01 PROCEDURE — 88112 CYTOPATH CELL ENHANCE TECH: CPT | Mod: 26

## 2022-01-01 PROCEDURE — 36573 INSJ PICC RS&I 5 YR+: CPT

## 2022-01-01 PROCEDURE — 86850 RBC ANTIBODY SCREEN: CPT

## 2022-01-01 PROCEDURE — 74177 CT ABD & PELVIS W/CONTRAST: CPT | Mod: 26

## 2022-01-01 PROCEDURE — 74177 CT ABD & PELVIS W/CONTRAST: CPT | Mod: MA

## 2022-01-01 PROCEDURE — 0225U NFCT DS DNA&RNA 21 SARSCOV2: CPT

## 2022-01-01 PROCEDURE — 74018 RADEX ABDOMEN 1 VIEW: CPT | Mod: 26

## 2022-01-01 PROCEDURE — 83735 ASSAY OF MAGNESIUM: CPT

## 2022-01-01 PROCEDURE — 99285 EMERGENCY DEPT VISIT HI MDM: CPT

## 2022-01-01 PROCEDURE — 93971 EXTREMITY STUDY: CPT

## 2022-01-01 PROCEDURE — 99231 SBSQ HOSP IP/OBS SF/LOW 25: CPT | Mod: GC

## 2022-01-01 PROCEDURE — 93970 EXTREMITY STUDY: CPT | Mod: 26

## 2022-01-01 PROCEDURE — 87205 SMEAR GRAM STAIN: CPT

## 2022-01-01 PROCEDURE — 74177 CT ABD & PELVIS W/CONTRAST: CPT | Mod: MD

## 2022-01-01 PROCEDURE — 96365 THER/PROPH/DIAG IV INF INIT: CPT

## 2022-01-01 PROCEDURE — 83521 IG LIGHT CHAINS FREE EACH: CPT

## 2022-01-01 PROCEDURE — 36410 VNPNXR 3YR/> PHY/QHP DX/THER: CPT

## 2022-01-01 PROCEDURE — 71275 CT ANGIOGRAPHY CHEST: CPT | Mod: 26,MA

## 2022-01-01 PROCEDURE — 87102 FUNGUS ISOLATION CULTURE: CPT

## 2022-01-01 PROCEDURE — 83540 ASSAY OF IRON: CPT

## 2022-01-01 PROCEDURE — 83550 IRON BINDING TEST: CPT

## 2022-01-01 PROCEDURE — 82272 OCCULT BLD FECES 1-3 TESTS: CPT

## 2022-01-01 PROCEDURE — 97530 THERAPEUTIC ACTIVITIES: CPT

## 2022-01-01 PROCEDURE — 97162 PT EVAL MOD COMPLEX 30 MIN: CPT

## 2022-01-01 PROCEDURE — 70450 CT HEAD/BRAIN W/O DYE: CPT | Mod: 26,MA

## 2022-01-01 PROCEDURE — 82728 ASSAY OF FERRITIN: CPT

## 2022-01-01 PROCEDURE — 83935 ASSAY OF URINE OSMOLALITY: CPT

## 2022-01-01 PROCEDURE — 71260 CT THORAX DX C+: CPT

## 2022-01-01 PROCEDURE — 36569 INSJ PICC 5 YR+ W/O IMAGING: CPT

## 2022-01-01 PROCEDURE — 74177 CT ABD & PELVIS W/CONTRAST: CPT | Mod: 26,MA

## 2022-01-01 DEVICE — PACK THORACENTESIS CHG: Type: IMPLANTABLE DEVICE | Status: FUNCTIONAL

## 2022-01-01 RX ORDER — SODIUM BICARBONATE 1 MEQ/ML
50 SYRINGE (ML) INTRAVENOUS ONCE
Refills: 0 | Status: COMPLETED | OUTPATIENT
Start: 2022-01-01 | End: 2022-01-01

## 2022-01-01 RX ORDER — ACETAMINOPHEN 500 MG
1000 TABLET ORAL ONCE
Refills: 0 | Status: DISCONTINUED | OUTPATIENT
Start: 2022-01-01 | End: 2022-01-01

## 2022-01-01 RX ORDER — IPRATROPIUM/ALBUTEROL SULFATE 18-103MCG
3 AEROSOL WITH ADAPTER (GRAM) INHALATION ONCE
Refills: 0 | Status: COMPLETED | OUTPATIENT
Start: 2022-01-01 | End: 2022-01-01

## 2022-01-01 RX ORDER — TRAMADOL HYDROCHLORIDE 50 MG/1
50 TABLET ORAL
Refills: 0 | Status: DISCONTINUED | OUTPATIENT
Start: 2022-01-01 | End: 2022-01-01

## 2022-01-01 RX ORDER — METOPROLOL TARTRATE 50 MG
5 TABLET ORAL EVERY 6 HOURS
Refills: 0 | Status: DISCONTINUED | OUTPATIENT
Start: 2022-01-01 | End: 2022-01-01

## 2022-01-01 RX ORDER — HUMAN INSULIN 100 [IU]/ML
7 INJECTION, SUSPENSION SUBCUTANEOUS EVERY 6 HOURS
Refills: 0 | Status: DISCONTINUED | OUTPATIENT
Start: 2022-01-01 | End: 2022-01-01

## 2022-01-01 RX ORDER — SODIUM CHLORIDE 9 MG/ML
4 INJECTION INTRAMUSCULAR; INTRAVENOUS; SUBCUTANEOUS
Qty: 0 | Refills: 0 | DISCHARGE
Start: 2022-01-01

## 2022-01-01 RX ORDER — VANCOMYCIN HCL 1 G
1000 VIAL (EA) INTRAVENOUS EVERY 24 HOURS
Refills: 0 | Status: DISCONTINUED | OUTPATIENT
Start: 2022-01-01 | End: 2022-01-01

## 2022-01-01 RX ORDER — INSULIN HUMAN 100 [IU]/ML
6 INJECTION, SOLUTION SUBCUTANEOUS ONCE
Refills: 0 | Status: COMPLETED | OUTPATIENT
Start: 2022-01-01 | End: 2022-01-01

## 2022-01-01 RX ORDER — LEVETIRACETAM 250 MG/1
750 TABLET, FILM COATED ORAL EVERY 12 HOURS
Refills: 0 | Status: DISCONTINUED | OUTPATIENT
Start: 2022-01-01 | End: 2022-01-01

## 2022-01-01 RX ORDER — LEVETIRACETAM 250 MG/1
7.5 TABLET, FILM COATED ORAL
Qty: 0 | Refills: 0 | DISCHARGE
Start: 2022-01-01

## 2022-01-01 RX ORDER — IPRATROPIUM/ALBUTEROL SULFATE 18-103MCG
3 AEROSOL WITH ADAPTER (GRAM) INHALATION
Qty: 0 | Refills: 0 | DISCHARGE
Start: 2022-01-01

## 2022-01-01 RX ORDER — ALLOPURINOL 300 MG
100 TABLET ORAL DAILY
Refills: 0 | Status: DISCONTINUED | OUTPATIENT
Start: 2022-01-01 | End: 2022-01-01

## 2022-01-01 RX ORDER — BUDESONIDE, MICRONIZED 100 %
0.5 POWDER (GRAM) MISCELLANEOUS EVERY 12 HOURS
Refills: 0 | Status: DISCONTINUED | OUTPATIENT
Start: 2022-01-01 | End: 2022-01-01

## 2022-01-01 RX ORDER — DEXTROSE 50 % IN WATER 50 %
12.5 SYRINGE (ML) INTRAVENOUS ONCE
Refills: 0 | Status: DISCONTINUED | OUTPATIENT
Start: 2022-01-01 | End: 2022-01-01

## 2022-01-01 RX ORDER — FUROSEMIDE 40 MG
20 TABLET ORAL ONCE
Refills: 0 | Status: COMPLETED | OUTPATIENT
Start: 2022-01-01 | End: 2022-01-01

## 2022-01-01 RX ORDER — VANCOMYCIN HCL 1 G
1000 VIAL (EA) INTRAVENOUS EVERY 12 HOURS
Refills: 0 | Status: DISCONTINUED | OUTPATIENT
Start: 2022-01-01 | End: 2022-01-01

## 2022-01-01 RX ORDER — DEXTROSE 50 % IN WATER 50 %
15 SYRINGE (ML) INTRAVENOUS ONCE
Refills: 0 | Status: DISCONTINUED | OUTPATIENT
Start: 2022-01-01 | End: 2022-01-01

## 2022-01-01 RX ORDER — DIGOXIN 250 MCG
125 TABLET ORAL DAILY
Refills: 0 | Status: DISCONTINUED | OUTPATIENT
Start: 2022-01-01 | End: 2022-01-01

## 2022-01-01 RX ORDER — ACETAMINOPHEN 500 MG
1000 TABLET ORAL ONCE
Refills: 0 | Status: COMPLETED | OUTPATIENT
Start: 2022-01-01 | End: 2022-01-01

## 2022-01-01 RX ORDER — LEVOTHYROXINE SODIUM 125 MCG
75 TABLET ORAL DAILY
Refills: 0 | Status: DISCONTINUED | OUTPATIENT
Start: 2022-01-01 | End: 2022-01-01

## 2022-01-01 RX ORDER — VANCOMYCIN HCL 1 G
1000 VIAL (EA) INTRAVENOUS ONCE
Refills: 0 | Status: COMPLETED | OUTPATIENT
Start: 2022-01-01 | End: 2022-01-01

## 2022-01-01 RX ORDER — PREGABALIN 225 MG/1
1 CAPSULE ORAL
Qty: 0 | Refills: 0 | DISCHARGE

## 2022-01-01 RX ORDER — ALPRAZOLAM 0.25 MG
0.25 TABLET ORAL EVERY 8 HOURS
Refills: 0 | Status: DISCONTINUED | OUTPATIENT
Start: 2022-01-01 | End: 2022-01-01

## 2022-01-01 RX ORDER — INSULIN HUMAN 100 [IU]/ML
4 INJECTION, SOLUTION SUBCUTANEOUS ONCE
Refills: 0 | Status: DISCONTINUED | OUTPATIENT
Start: 2022-01-01 | End: 2022-01-01

## 2022-01-01 RX ORDER — ACETYLCYSTEINE 200 MG/ML
4 VIAL (ML) MISCELLANEOUS
Qty: 0 | Refills: 0 | DISCHARGE
Start: 2022-01-01

## 2022-01-01 RX ORDER — FAMOTIDINE 10 MG/ML
20 INJECTION INTRAVENOUS ONCE
Refills: 0 | Status: COMPLETED | OUTPATIENT
Start: 2022-01-01 | End: 2022-01-01

## 2022-01-01 RX ORDER — ESCITALOPRAM OXALATE 10 MG/1
1 TABLET, FILM COATED ORAL
Qty: 0 | Refills: 0 | DISCHARGE

## 2022-01-01 RX ORDER — SODIUM CHLORIDE 9 MG/ML
4 INJECTION INTRAMUSCULAR; INTRAVENOUS; SUBCUTANEOUS EVERY 12 HOURS
Refills: 0 | Status: DISCONTINUED | OUTPATIENT
Start: 2022-01-01 | End: 2022-01-01

## 2022-01-01 RX ORDER — LANOLIN ALCOHOL/MO/W.PET/CERES
3 CREAM (GRAM) TOPICAL AT BEDTIME
Refills: 0 | Status: DISCONTINUED | OUTPATIENT
Start: 2022-01-01 | End: 2022-01-01

## 2022-01-01 RX ORDER — HUMAN INSULIN 100 [IU]/ML
7 INJECTION, SUSPENSION SUBCUTANEOUS EVERY 8 HOURS
Refills: 0 | Status: DISCONTINUED | OUTPATIENT
Start: 2022-01-01 | End: 2022-01-01

## 2022-01-01 RX ORDER — PREDNISOLONE 5 MG
TABLET ORAL
Refills: 0 | Status: DISCONTINUED | OUTPATIENT
Start: 2022-01-01 | End: 2022-01-01

## 2022-01-01 RX ORDER — VANCOMYCIN HCL 1 G
750 VIAL (EA) INTRAVENOUS EVERY 24 HOURS
Refills: 0 | Status: COMPLETED | OUTPATIENT
Start: 2022-01-01 | End: 2022-01-01

## 2022-01-01 RX ORDER — POLYETHYLENE GLYCOL 3350 17 G/17G
17 POWDER, FOR SOLUTION ORAL DAILY
Refills: 0 | Status: DISCONTINUED | OUTPATIENT
Start: 2022-01-01 | End: 2022-01-01

## 2022-01-01 RX ORDER — HYDROMORPHONE HYDROCHLORIDE 2 MG/ML
0.25 INJECTION INTRAMUSCULAR; INTRAVENOUS; SUBCUTANEOUS EVERY 8 HOURS
Refills: 0 | Status: DISCONTINUED | OUTPATIENT
Start: 2022-01-01 | End: 2022-01-01

## 2022-01-01 RX ORDER — DEXTROSE 50 % IN WATER 50 %
25 SYRINGE (ML) INTRAVENOUS ONCE
Refills: 0 | Status: DISCONTINUED | OUTPATIENT
Start: 2022-01-01 | End: 2022-01-01

## 2022-01-01 RX ORDER — SODIUM CHLORIDE 9 MG/ML
1000 INJECTION, SOLUTION INTRAVENOUS
Refills: 0 | Status: DISCONTINUED | OUTPATIENT
Start: 2022-01-01 | End: 2022-01-01

## 2022-01-01 RX ORDER — IPRATROPIUM/ALBUTEROL SULFATE 18-103MCG
3 AEROSOL WITH ADAPTER (GRAM) INHALATION EVERY 6 HOURS
Refills: 0 | Status: DISCONTINUED | OUTPATIENT
Start: 2022-01-01 | End: 2022-01-01

## 2022-01-01 RX ORDER — DIGOXIN 250 MCG
250 TABLET ORAL EVERY 4 HOURS
Refills: 0 | Status: COMPLETED | OUTPATIENT
Start: 2022-01-01 | End: 2022-01-01

## 2022-01-01 RX ORDER — ALLOPURINOL 300 MG
1 TABLET ORAL
Qty: 0 | Refills: 0 | DISCHARGE
Start: 2022-01-01

## 2022-01-01 RX ORDER — ATORVASTATIN CALCIUM 80 MG/1
20 TABLET, FILM COATED ORAL AT BEDTIME
Refills: 0 | Status: DISCONTINUED | OUTPATIENT
Start: 2022-01-01 | End: 2022-01-01

## 2022-01-01 RX ORDER — ACETAMINOPHEN 500 MG
650 TABLET ORAL EVERY 6 HOURS
Refills: 0 | Status: DISCONTINUED | OUTPATIENT
Start: 2022-01-01 | End: 2022-01-01

## 2022-01-01 RX ORDER — ACETYLCYSTEINE 200 MG/ML
4 VIAL (ML) MISCELLANEOUS EVERY 6 HOURS
Refills: 0 | Status: DISCONTINUED | OUTPATIENT
Start: 2022-01-01 | End: 2022-01-01

## 2022-01-01 RX ORDER — RIVAROXABAN 15 MG-20MG
10 KIT ORAL DAILY
Refills: 0 | Status: DISCONTINUED | OUTPATIENT
Start: 2022-01-01 | End: 2022-01-01

## 2022-01-01 RX ORDER — BACLOFEN 100 %
1 POWDER (GRAM) MISCELLANEOUS
Qty: 0 | Refills: 0 | DISCHARGE

## 2022-01-01 RX ORDER — ONDANSETRON 8 MG/1
4 TABLET, FILM COATED ORAL EVERY 6 HOURS
Refills: 0 | Status: DISCONTINUED | OUTPATIENT
Start: 2022-01-01 | End: 2022-01-01

## 2022-01-01 RX ORDER — ENOXAPARIN SODIUM 100 MG/ML
40 INJECTION SUBCUTANEOUS EVERY 24 HOURS
Refills: 0 | Status: DISCONTINUED | OUTPATIENT
Start: 2022-01-01 | End: 2022-01-01

## 2022-01-01 RX ORDER — HUMAN INSULIN 100 [IU]/ML
10 INJECTION, SUSPENSION SUBCUTANEOUS EVERY 6 HOURS
Refills: 0 | Status: DISCONTINUED | OUTPATIENT
Start: 2022-01-01 | End: 2022-01-01

## 2022-01-01 RX ORDER — ASCORBIC ACID 60 MG
500 TABLET,CHEWABLE ORAL DAILY
Refills: 0 | Status: DISCONTINUED | OUTPATIENT
Start: 2022-01-01 | End: 2022-01-01

## 2022-01-01 RX ORDER — SODIUM CHLORIDE 9 MG/ML
500 INJECTION INTRAMUSCULAR; INTRAVENOUS; SUBCUTANEOUS
Refills: 0 | Status: COMPLETED | OUTPATIENT
Start: 2022-01-01 | End: 2022-01-01

## 2022-01-01 RX ORDER — LEVOTHYROXINE SODIUM 125 MCG
1 TABLET ORAL
Qty: 0 | Refills: 0 | DISCHARGE
Start: 2022-01-01

## 2022-01-01 RX ORDER — DILTIAZEM HCL 120 MG
30 CAPSULE, EXT RELEASE 24 HR ORAL THREE TIMES A DAY
Refills: 0 | Status: DISCONTINUED | OUTPATIENT
Start: 2022-01-01 | End: 2022-01-01

## 2022-01-01 RX ORDER — HUMAN INSULIN 100 [IU]/ML
6 INJECTION, SUSPENSION SUBCUTANEOUS ONCE
Refills: 0 | Status: COMPLETED | OUTPATIENT
Start: 2022-01-01 | End: 2022-01-01

## 2022-01-01 RX ORDER — CHLORHEXIDINE GLUCONATE 213 G/1000ML
1 SOLUTION TOPICAL
Refills: 0 | Status: DISCONTINUED | OUTPATIENT
Start: 2022-01-01 | End: 2022-01-01

## 2022-01-01 RX ORDER — LEVETIRACETAM 250 MG/1
750 TABLET, FILM COATED ORAL
Refills: 0 | Status: DISCONTINUED | OUTPATIENT
Start: 2022-01-01 | End: 2022-01-01

## 2022-01-01 RX ORDER — SODIUM CHLORIDE 9 MG/ML
1000 INJECTION, SOLUTION INTRAVENOUS
Refills: 0 | Status: COMPLETED | OUTPATIENT
Start: 2022-01-01 | End: 2022-01-01

## 2022-01-01 RX ORDER — SODIUM CHLORIDE 9 MG/ML
500 INJECTION, SOLUTION INTRAVENOUS ONCE
Refills: 0 | Status: COMPLETED | OUTPATIENT
Start: 2022-01-01 | End: 2022-01-01

## 2022-01-01 RX ORDER — POTASSIUM CHLORIDE 20 MEQ
40 PACKET (EA) ORAL EVERY 4 HOURS
Refills: 0 | Status: COMPLETED | OUTPATIENT
Start: 2022-01-01 | End: 2022-01-01

## 2022-01-01 RX ORDER — ACETAMINOPHEN 500 MG
650 TABLET ORAL ONCE
Refills: 0 | Status: COMPLETED | OUTPATIENT
Start: 2022-01-01 | End: 2022-01-01

## 2022-01-01 RX ORDER — ALTEPLASE 100 MG
2 KIT INTRAVENOUS ONCE
Refills: 0 | Status: COMPLETED | OUTPATIENT
Start: 2022-01-01 | End: 2022-01-01

## 2022-01-01 RX ORDER — ANASTROZOLE 1 MG/1
1 TABLET ORAL DAILY
Refills: 0 | Status: DISCONTINUED | OUTPATIENT
Start: 2022-01-01 | End: 2022-01-01

## 2022-01-01 RX ORDER — FOLIC ACID 0.8 MG
1 TABLET ORAL
Qty: 0 | Refills: 0 | DISCHARGE

## 2022-01-01 RX ORDER — AMLODIPINE BESYLATE 2.5 MG/1
10 TABLET ORAL DAILY
Refills: 0 | Status: DISCONTINUED | OUTPATIENT
Start: 2022-01-01 | End: 2022-01-01

## 2022-01-01 RX ORDER — PREGABALIN 225 MG/1
1000 CAPSULE ORAL DAILY
Refills: 0 | Status: DISCONTINUED | OUTPATIENT
Start: 2022-01-01 | End: 2022-01-01

## 2022-01-01 RX ORDER — ANASTROZOLE 1 MG/1
1 TABLET ORAL
Qty: 0 | Refills: 0 | DISCHARGE
Start: 2022-01-01

## 2022-01-01 RX ORDER — IPRATROPIUM BROMIDE 0.2 MG/ML
2 SOLUTION, NON-ORAL INHALATION
Refills: 0 | Status: DISCONTINUED | OUTPATIENT
Start: 2022-01-01 | End: 2022-01-01

## 2022-01-01 RX ORDER — IPRATROPIUM/ALBUTEROL SULFATE 18-103MCG
3 AEROSOL WITH ADAPTER (GRAM) INHALATION
Refills: 0 | Status: DISCONTINUED | OUTPATIENT
Start: 2022-01-01 | End: 2022-01-01

## 2022-01-01 RX ORDER — REMDESIVIR 5 MG/ML
100 INJECTION INTRAVENOUS EVERY 24 HOURS
Refills: 0 | Status: COMPLETED | OUTPATIENT
Start: 2022-01-01 | End: 2022-01-01

## 2022-01-01 RX ORDER — PREDNISOLONE 5 MG
45 TABLET ORAL DAILY
Refills: 0 | Status: COMPLETED | OUTPATIENT
Start: 2022-01-01 | End: 2022-01-01

## 2022-01-01 RX ORDER — LEVOTHYROXINE SODIUM 125 MCG
60 TABLET ORAL AT BEDTIME
Refills: 0 | Status: DISCONTINUED | OUTPATIENT
Start: 2022-01-01 | End: 2022-01-01

## 2022-01-01 RX ORDER — ALBUTEROL 90 UG/1
1.25 AEROSOL, METERED ORAL THREE TIMES A DAY
Refills: 0 | Status: DISCONTINUED | OUTPATIENT
Start: 2022-01-01 | End: 2022-01-01

## 2022-01-01 RX ORDER — VASOPRESSIN 20 [USP'U]/ML
0.1 INJECTION INTRAVENOUS
Qty: 40 | Refills: 0 | Status: DISCONTINUED | OUTPATIENT
Start: 2022-01-01 | End: 2022-01-01

## 2022-01-01 RX ORDER — TRAMADOL HYDROCHLORIDE 50 MG/1
1 TABLET ORAL
Qty: 0 | Refills: 0 | DISCHARGE

## 2022-01-01 RX ORDER — BACLOFEN 100 %
5 POWDER (GRAM) MISCELLANEOUS EVERY 12 HOURS
Refills: 0 | Status: DISCONTINUED | OUTPATIENT
Start: 2022-01-01 | End: 2022-01-01

## 2022-01-01 RX ORDER — CEFAZOLIN SODIUM 1 G
1000 VIAL (EA) INJECTION EVERY 8 HOURS
Refills: 0 | Status: DISCONTINUED | OUTPATIENT
Start: 2022-01-01 | End: 2022-01-01

## 2022-01-01 RX ORDER — DILTIAZEM HCL 120 MG
1 CAPSULE, EXT RELEASE 24 HR ORAL
Qty: 0 | Refills: 0 | DISCHARGE
Start: 2022-01-01

## 2022-01-01 RX ORDER — PREDNISOLONE 5 MG
30 TABLET ORAL DAILY
Refills: 0 | Status: DISCONTINUED | OUTPATIENT
Start: 2022-01-01 | End: 2022-01-01

## 2022-01-01 RX ORDER — VANCOMYCIN HCL 1 G
750 VIAL (EA) INTRAVENOUS EVERY 24 HOURS
Refills: 0 | Status: DISCONTINUED | OUTPATIENT
Start: 2022-01-01 | End: 2022-01-01

## 2022-01-01 RX ORDER — EPINEPHRINE 11.25MG/ML
0.5 SOLUTION, NON-ORAL INHALATION ONCE
Refills: 0 | Status: COMPLETED | OUTPATIENT
Start: 2022-01-01 | End: 2022-01-01

## 2022-01-01 RX ORDER — ATORVASTATIN CALCIUM 80 MG/1
1 TABLET, FILM COATED ORAL
Qty: 0 | Refills: 0 | DISCHARGE
Start: 2022-01-01

## 2022-01-01 RX ORDER — METOPROLOL TARTRATE 50 MG
5 TABLET ORAL ONCE
Refills: 0 | Status: COMPLETED | OUTPATIENT
Start: 2022-01-01 | End: 2022-01-01

## 2022-01-01 RX ORDER — PANTOPRAZOLE SODIUM 20 MG/1
8 TABLET, DELAYED RELEASE ORAL
Qty: 80 | Refills: 0 | Status: DISCONTINUED | OUTPATIENT
Start: 2022-01-01 | End: 2022-01-01

## 2022-01-01 RX ORDER — HEPARIN SODIUM 5000 [USP'U]/ML
5000 INJECTION INTRAVENOUS; SUBCUTANEOUS EVERY 8 HOURS
Refills: 0 | Status: DISCONTINUED | OUTPATIENT
Start: 2022-01-01 | End: 2022-01-01

## 2022-01-01 RX ORDER — ONDANSETRON 8 MG/1
4 TABLET, FILM COATED ORAL ONCE
Refills: 0 | Status: DISCONTINUED | OUTPATIENT
Start: 2022-01-01 | End: 2022-01-01

## 2022-01-01 RX ORDER — HUMAN INSULIN 100 [IU]/ML
6 INJECTION, SUSPENSION SUBCUTANEOUS EVERY 6 HOURS
Refills: 0 | Status: DISCONTINUED | OUTPATIENT
Start: 2022-01-01 | End: 2022-01-01

## 2022-01-01 RX ORDER — PREDNISOLONE 5 MG
15 TABLET ORAL DAILY
Refills: 0 | Status: CANCELLED | OUTPATIENT
Start: 2022-10-14 | End: 2022-01-01

## 2022-01-01 RX ORDER — PIPERACILLIN AND TAZOBACTAM 4; .5 G/20ML; G/20ML
3.38 INJECTION, POWDER, LYOPHILIZED, FOR SOLUTION INTRAVENOUS EVERY 8 HOURS
Refills: 0 | Status: DISCONTINUED | OUTPATIENT
Start: 2022-01-01 | End: 2022-01-01

## 2022-01-01 RX ORDER — ACETAMINOPHEN 500 MG
20.31 TABLET ORAL
Qty: 0 | Refills: 0 | DISCHARGE
Start: 2022-01-01

## 2022-01-01 RX ORDER — ERTAPENEM SODIUM 1 G/1
1000 INJECTION, POWDER, LYOPHILIZED, FOR SOLUTION INTRAMUSCULAR; INTRAVENOUS EVERY 24 HOURS
Refills: 0 | Status: DISCONTINUED | OUTPATIENT
Start: 2022-01-01 | End: 2022-01-01

## 2022-01-01 RX ORDER — PIPERACILLIN AND TAZOBACTAM 4; .5 G/20ML; G/20ML
3.38 INJECTION, POWDER, LYOPHILIZED, FOR SOLUTION INTRAVENOUS ONCE
Refills: 0 | Status: COMPLETED | OUTPATIENT
Start: 2022-01-01 | End: 2022-01-01

## 2022-01-01 RX ORDER — HYDROMORPHONE HYDROCHLORIDE 2 MG/ML
0.25 INJECTION INTRAMUSCULAR; INTRAVENOUS; SUBCUTANEOUS ONCE
Refills: 0 | Status: DISCONTINUED | OUTPATIENT
Start: 2022-01-01 | End: 2022-01-01

## 2022-01-01 RX ORDER — LEVETIRACETAM 250 MG/1
500 TABLET, FILM COATED ORAL
Refills: 0 | Status: DISCONTINUED | OUTPATIENT
Start: 2022-01-01 | End: 2022-01-01

## 2022-01-01 RX ORDER — SENNA PLUS 8.6 MG/1
2 TABLET ORAL AT BEDTIME
Refills: 0 | Status: DISCONTINUED | OUTPATIENT
Start: 2022-01-01 | End: 2022-01-01

## 2022-01-01 RX ORDER — INSULIN LISPRO 100/ML
VIAL (ML) SUBCUTANEOUS AT BEDTIME
Refills: 0 | Status: DISCONTINUED | OUTPATIENT
Start: 2022-01-01 | End: 2022-01-01

## 2022-01-01 RX ORDER — METOPROLOL TARTRATE 50 MG
1 TABLET ORAL
Qty: 0 | Refills: 0 | DISCHARGE
Start: 2022-01-01

## 2022-01-01 RX ORDER — CEFTRIAXONE 500 MG/1
1000 INJECTION, POWDER, FOR SOLUTION INTRAMUSCULAR; INTRAVENOUS EVERY 24 HOURS
Refills: 0 | Status: DISCONTINUED | OUTPATIENT
Start: 2022-01-01 | End: 2022-01-01

## 2022-01-01 RX ORDER — SODIUM CHLORIDE 9 MG/ML
1000 INJECTION, SOLUTION INTRAVENOUS ONCE
Refills: 0 | Status: COMPLETED | OUTPATIENT
Start: 2022-01-01 | End: 2022-01-01

## 2022-01-01 RX ORDER — ERTAPENEM SODIUM 1 G/1
1000 INJECTION, POWDER, LYOPHILIZED, FOR SOLUTION INTRAMUSCULAR; INTRAVENOUS EVERY 24 HOURS
Refills: 0 | Status: COMPLETED | OUTPATIENT
Start: 2022-01-01 | End: 2022-01-01

## 2022-01-01 RX ORDER — MORPHINE SULFATE 50 MG/1
4 CAPSULE, EXTENDED RELEASE ORAL ONCE
Refills: 0 | Status: DISCONTINUED | OUTPATIENT
Start: 2022-01-01 | End: 2022-01-01

## 2022-01-01 RX ORDER — ACETAMINOPHEN 500 MG
800 TABLET ORAL EVERY 6 HOURS
Refills: 0 | Status: DISCONTINUED | OUTPATIENT
Start: 2022-01-01 | End: 2022-01-01

## 2022-01-01 RX ORDER — CEFEPIME 1 G/1
1000 INJECTION, POWDER, FOR SOLUTION INTRAMUSCULAR; INTRAVENOUS ONCE
Refills: 0 | Status: COMPLETED | OUTPATIENT
Start: 2022-01-01 | End: 2022-01-01

## 2022-01-01 RX ORDER — BACLOFEN 100 %
10 POWDER (GRAM) MISCELLANEOUS EVERY 12 HOURS
Refills: 0 | Status: DISCONTINUED | OUTPATIENT
Start: 2022-01-01 | End: 2022-01-01

## 2022-01-01 RX ORDER — ACETYLCYSTEINE 200 MG/ML
4 VIAL (ML) MISCELLANEOUS THREE TIMES A DAY
Refills: 0 | Status: DISCONTINUED | OUTPATIENT
Start: 2022-01-01 | End: 2022-01-01

## 2022-01-01 RX ORDER — SODIUM CHLORIDE 9 MG/ML
4 INJECTION INTRAMUSCULAR; INTRAVENOUS; SUBCUTANEOUS EVERY 6 HOURS
Refills: 0 | Status: DISCONTINUED | OUTPATIENT
Start: 2022-01-01 | End: 2022-01-01

## 2022-01-01 RX ORDER — LEVOTHYROXINE SODIUM 125 MCG
37.5 TABLET ORAL AT BEDTIME
Refills: 0 | Status: DISCONTINUED | OUTPATIENT
Start: 2022-01-01 | End: 2022-01-01

## 2022-01-01 RX ORDER — VANCOMYCIN HCL 1 G
1000 VIAL (EA) INTRAVENOUS ONCE
Refills: 0 | Status: DISCONTINUED | OUTPATIENT
Start: 2022-01-01 | End: 2022-01-01

## 2022-01-01 RX ORDER — METOPROLOL TARTRATE 50 MG
12.5 TABLET ORAL
Refills: 0 | Status: DISCONTINUED | OUTPATIENT
Start: 2022-01-01 | End: 2022-01-01

## 2022-01-01 RX ORDER — ACETYLCYSTEINE 200 MG/ML
4 VIAL (ML) MISCELLANEOUS THREE TIMES A DAY
Refills: 0 | Status: COMPLETED | OUTPATIENT
Start: 2022-01-01 | End: 2022-01-01

## 2022-01-01 RX ORDER — LANOLIN ALCOHOL/MO/W.PET/CERES
1 CREAM (GRAM) TOPICAL
Qty: 0 | Refills: 0 | DISCHARGE
Start: 2022-01-01

## 2022-01-01 RX ORDER — ACETAMINOPHEN 500 MG
800 TABLET ORAL EVERY 6 HOURS
Refills: 0 | Status: COMPLETED | OUTPATIENT
Start: 2022-01-01 | End: 2022-01-01

## 2022-01-01 RX ORDER — PHENYLEPHRINE HYDROCHLORIDE 10 MG/ML
0.5 INJECTION INTRAVENOUS
Qty: 40 | Refills: 0 | Status: DISCONTINUED | OUTPATIENT
Start: 2022-01-01 | End: 2022-01-01

## 2022-01-01 RX ORDER — CALCIUM CHLORIDE
1000 POWDER (GRAM) MISCELLANEOUS ONCE
Refills: 0 | Status: COMPLETED | OUTPATIENT
Start: 2022-01-01 | End: 2022-01-01

## 2022-01-01 RX ORDER — DIGOXIN 250 MCG
250 TABLET ORAL ONCE
Refills: 0 | Status: COMPLETED | OUTPATIENT
Start: 2022-01-01 | End: 2022-01-01

## 2022-01-01 RX ORDER — LEVOTHYROXINE SODIUM 125 MCG
35 TABLET ORAL AT BEDTIME
Refills: 0 | Status: DISCONTINUED | OUTPATIENT
Start: 2022-01-01 | End: 2022-01-01

## 2022-01-01 RX ORDER — NYSTATIN CREAM 100000 [USP'U]/G
1 CREAM TOPICAL
Refills: 0 | Status: DISCONTINUED | OUTPATIENT
Start: 2022-01-01 | End: 2022-01-01

## 2022-01-01 RX ORDER — INSULIN LISPRO 100/ML
VIAL (ML) SUBCUTANEOUS
Refills: 0 | Status: DISCONTINUED | OUTPATIENT
Start: 2022-01-01 | End: 2022-01-01

## 2022-01-01 RX ORDER — PIPERACILLIN AND TAZOBACTAM 4; .5 G/20ML; G/20ML
3.38 INJECTION, POWDER, LYOPHILIZED, FOR SOLUTION INTRAVENOUS ONCE
Refills: 0 | Status: DISCONTINUED | OUTPATIENT
Start: 2022-01-01 | End: 2022-01-01

## 2022-01-01 RX ORDER — INSULIN GLARGINE 100 [IU]/ML
8 INJECTION, SOLUTION SUBCUTANEOUS AT BEDTIME
Refills: 0 | Status: DISCONTINUED | OUTPATIENT
Start: 2022-01-01 | End: 2022-01-01

## 2022-01-01 RX ORDER — DILTIAZEM HCL 120 MG
30 CAPSULE, EXT RELEASE 24 HR ORAL EVERY 6 HOURS
Refills: 0 | Status: DISCONTINUED | OUTPATIENT
Start: 2022-01-01 | End: 2022-01-01

## 2022-01-01 RX ORDER — RIVAROXABAN 15 MG-20MG
20 KIT ORAL
Refills: 0 | Status: DISCONTINUED | OUTPATIENT
Start: 2022-01-01 | End: 2022-01-01

## 2022-01-01 RX ORDER — CHLORHEXIDINE GLUCONATE 213 G/1000ML
15 SOLUTION TOPICAL EVERY 12 HOURS
Refills: 0 | Status: DISCONTINUED | OUTPATIENT
Start: 2022-01-01 | End: 2022-01-01

## 2022-01-01 RX ORDER — HUMAN INSULIN 100 [IU]/ML
9 INJECTION, SUSPENSION SUBCUTANEOUS EVERY 6 HOURS
Refills: 0 | Status: DISCONTINUED | OUTPATIENT
Start: 2022-01-01 | End: 2022-01-01

## 2022-01-01 RX ORDER — PREDNISOLONE 5 MG
45 TABLET ORAL DAILY
Refills: 0 | Status: DISCONTINUED | OUTPATIENT
Start: 2022-01-01 | End: 2022-01-01

## 2022-01-01 RX ORDER — LEVOTHYROXINE SODIUM 125 MCG
37.5 TABLET ORAL
Refills: 0 | Status: DISCONTINUED | OUTPATIENT
Start: 2022-01-01 | End: 2022-01-01

## 2022-01-01 RX ORDER — METOPROLOL TARTRATE 50 MG
50 TABLET ORAL
Refills: 0 | Status: DISCONTINUED | OUTPATIENT
Start: 2022-01-01 | End: 2022-01-01

## 2022-01-01 RX ORDER — PANTOPRAZOLE SODIUM 20 MG/1
40 TABLET, DELAYED RELEASE ORAL ONCE
Refills: 0 | Status: COMPLETED | OUTPATIENT
Start: 2022-01-01 | End: 2022-01-01

## 2022-01-01 RX ORDER — INSULIN HUMAN 100 [IU]/ML
6 INJECTION, SOLUTION SUBCUTANEOUS
Qty: 100 | Refills: 0 | Status: DISCONTINUED | OUTPATIENT
Start: 2022-01-01 | End: 2022-01-01

## 2022-01-01 RX ORDER — REMDESIVIR 5 MG/ML
INJECTION INTRAVENOUS
Refills: 0 | Status: COMPLETED | OUTPATIENT
Start: 2022-01-01 | End: 2022-01-01

## 2022-01-01 RX ORDER — PROPOFOL 10 MG/ML
10 INJECTION, EMULSION INTRAVENOUS
Qty: 500 | Refills: 0 | Status: DISCONTINUED | OUTPATIENT
Start: 2022-01-01 | End: 2022-01-01

## 2022-01-01 RX ORDER — REMDESIVIR 5 MG/ML
200 INJECTION INTRAVENOUS EVERY 24 HOURS
Refills: 0 | Status: COMPLETED | OUTPATIENT
Start: 2022-01-01 | End: 2022-01-01

## 2022-01-01 RX ORDER — DEXTROSE 50 % IN WATER 50 %
25 SYRINGE (ML) INTRAVENOUS ONCE
Refills: 0 | Status: COMPLETED | OUTPATIENT
Start: 2022-01-01 | End: 2022-01-01

## 2022-01-01 RX ORDER — INSULIN LISPRO 100/ML
VIAL (ML) SUBCUTANEOUS EVERY 6 HOURS
Refills: 0 | Status: DISCONTINUED | OUTPATIENT
Start: 2022-01-01 | End: 2022-01-01

## 2022-01-01 RX ORDER — METOPROLOL TARTRATE 50 MG
25 TABLET ORAL
Refills: 0 | Status: DISCONTINUED | OUTPATIENT
Start: 2022-01-01 | End: 2022-01-01

## 2022-01-01 RX ORDER — SODIUM CHLORIDE 9 MG/ML
1000 INJECTION INTRAMUSCULAR; INTRAVENOUS; SUBCUTANEOUS ONCE
Refills: 0 | Status: COMPLETED | OUTPATIENT
Start: 2022-01-01 | End: 2022-01-01

## 2022-01-01 RX ORDER — CHLORHEXIDINE GLUCONATE 213 G/1000ML
1 SOLUTION TOPICAL DAILY
Refills: 0 | Status: DISCONTINUED | OUTPATIENT
Start: 2022-01-01 | End: 2022-01-01

## 2022-01-01 RX ORDER — AMIODARONE HYDROCHLORIDE 400 MG/1
1 TABLET ORAL
Qty: 900 | Refills: 0 | Status: DISCONTINUED | OUTPATIENT
Start: 2022-01-01 | End: 2022-01-01

## 2022-01-01 RX ORDER — NYSTATIN CREAM 100000 [USP'U]/G
1 CREAM TOPICAL
Qty: 0 | Refills: 0 | DISCHARGE
Start: 2022-01-01

## 2022-01-01 RX ORDER — ALBUTEROL 90 UG/1
2 AEROSOL, METERED ORAL
Refills: 0 | Status: DISCONTINUED | OUTPATIENT
Start: 2022-01-01 | End: 2022-01-01

## 2022-01-01 RX ORDER — ESCITALOPRAM OXALATE 10 MG/1
10 TABLET, FILM COATED ORAL DAILY
Refills: 0 | Status: DISCONTINUED | OUTPATIENT
Start: 2022-01-01 | End: 2022-01-01

## 2022-01-01 RX ORDER — INSULIN HUMAN 100 [IU]/ML
4 INJECTION, SOLUTION SUBCUTANEOUS
Qty: 100 | Refills: 0 | Status: DISCONTINUED | OUTPATIENT
Start: 2022-01-01 | End: 2022-01-01

## 2022-01-01 RX ORDER — PSYLLIUM SEED (WITH DEXTROSE)
1 POWDER (GRAM) ORAL DAILY
Refills: 0 | Status: DISCONTINUED | OUTPATIENT
Start: 2022-01-01 | End: 2022-01-01

## 2022-01-01 RX ORDER — IPRATROPIUM BROMIDE 0.2 MG/ML
500 SOLUTION, NON-ORAL INHALATION ONCE
Refills: 0 | Status: COMPLETED | OUTPATIENT
Start: 2022-01-01 | End: 2022-01-01

## 2022-01-01 RX ORDER — NOREPINEPHRINE BITARTRATE/D5W 8 MG/250ML
0.5 PLASTIC BAG, INJECTION (ML) INTRAVENOUS
Qty: 8 | Refills: 0 | Status: DISCONTINUED | OUTPATIENT
Start: 2022-01-01 | End: 2022-01-01

## 2022-01-01 RX ORDER — LEVOTHYROXINE SODIUM 125 MCG
1 TABLET ORAL
Qty: 0 | Refills: 0 | DISCHARGE

## 2022-01-01 RX ORDER — INSULIN LISPRO 100/ML
5 VIAL (ML) SUBCUTANEOUS
Refills: 0 | Status: DISCONTINUED | OUTPATIENT
Start: 2022-01-01 | End: 2022-01-01

## 2022-01-01 RX ORDER — ATORVASTATIN CALCIUM 80 MG/1
1 TABLET, FILM COATED ORAL
Qty: 0 | Refills: 0 | DISCHARGE

## 2022-01-01 RX ORDER — SALIVA SUBSTITUTE COMB NO.11 351 MG
2 POWDER IN PACKET (EA) MUCOUS MEMBRANE
Qty: 0 | Refills: 0 | DISCHARGE

## 2022-01-01 RX ORDER — HEPARIN SODIUM 5000 [USP'U]/ML
5000 INJECTION INTRAVENOUS; SUBCUTANEOUS EVERY 12 HOURS
Refills: 0 | Status: DISCONTINUED | OUTPATIENT
Start: 2022-01-01 | End: 2022-01-01

## 2022-01-01 RX ORDER — GLUCAGON INJECTION, SOLUTION 0.5 MG/.1ML
1 INJECTION, SOLUTION SUBCUTANEOUS ONCE
Refills: 0 | Status: DISCONTINUED | OUTPATIENT
Start: 2022-01-01 | End: 2022-01-01

## 2022-01-01 RX ADMIN — SODIUM CHLORIDE 4 MILLILITER(S): 9 INJECTION INTRAMUSCULAR; INTRAVENOUS; SUBCUTANEOUS at 18:01

## 2022-01-01 RX ADMIN — Medication 100 MILLIGRAM(S): at 12:25

## 2022-01-01 RX ADMIN — LEVETIRACETAM 750 MILLIGRAM(S): 250 TABLET, FILM COATED ORAL at 18:08

## 2022-01-01 RX ADMIN — HUMAN INSULIN 9 UNIT(S): 100 INJECTION, SUSPENSION SUBCUTANEOUS at 00:37

## 2022-01-01 RX ADMIN — Medication 100 MILLIGRAM(S): at 14:36

## 2022-01-01 RX ADMIN — Medication 1 DROP(S): at 12:25

## 2022-01-01 RX ADMIN — Medication 3 MILLILITER(S): at 20:47

## 2022-01-01 RX ADMIN — SODIUM CHLORIDE 4 MILLILITER(S): 9 INJECTION INTRAMUSCULAR; INTRAVENOUS; SUBCUTANEOUS at 07:03

## 2022-01-01 RX ADMIN — Medication 30 MILLIGRAM(S): at 17:25

## 2022-01-01 RX ADMIN — Medication 30 MILLIGRAM(S): at 15:29

## 2022-01-01 RX ADMIN — Medication 300 MILLIGRAM(S): at 12:26

## 2022-01-01 RX ADMIN — Medication 20 MILLIGRAM(S): at 06:26

## 2022-01-01 RX ADMIN — Medication 300 MILLIGRAM(S): at 13:03

## 2022-01-01 RX ADMIN — Medication 125 MICROGRAM(S): at 05:19

## 2022-01-01 RX ADMIN — CHLORHEXIDINE GLUCONATE 1 APPLICATION(S): 213 SOLUTION TOPICAL at 07:31

## 2022-01-01 RX ADMIN — ENOXAPARIN SODIUM 40 MILLIGRAM(S): 100 INJECTION SUBCUTANEOUS at 17:15

## 2022-01-01 RX ADMIN — CHLORHEXIDINE GLUCONATE 1 APPLICATION(S): 213 SOLUTION TOPICAL at 06:33

## 2022-01-01 RX ADMIN — Medication 3 MILLILITER(S): at 20:22

## 2022-01-01 RX ADMIN — Medication 650 MILLIGRAM(S): at 01:55

## 2022-01-01 RX ADMIN — PIPERACILLIN AND TAZOBACTAM 200 GRAM(S): 4; .5 INJECTION, POWDER, LYOPHILIZED, FOR SOLUTION INTRAVENOUS at 13:38

## 2022-01-01 RX ADMIN — Medication 650 MILLIGRAM(S): at 18:37

## 2022-01-01 RX ADMIN — SODIUM CHLORIDE 4 MILLILITER(S): 9 INJECTION INTRAMUSCULAR; INTRAVENOUS; SUBCUTANEOUS at 06:41

## 2022-01-01 RX ADMIN — Medication 0.5 MILLIGRAM(S): at 17:43

## 2022-01-01 RX ADMIN — Medication 1 DROP(S): at 21:19

## 2022-01-01 RX ADMIN — Medication 300 MILLIGRAM(S): at 01:03

## 2022-01-01 RX ADMIN — HEPARIN SODIUM 5000 UNIT(S): 5000 INJECTION INTRAVENOUS; SUBCUTANEOUS at 14:36

## 2022-01-01 RX ADMIN — PHENYLEPHRINE HYDROCHLORIDE 17.5 MICROGRAM(S)/KG/MIN: 10 INJECTION INTRAVENOUS at 21:06

## 2022-01-01 RX ADMIN — Medication 2 PUFF(S): at 05:55

## 2022-01-01 RX ADMIN — Medication 650 MILLIGRAM(S): at 11:29

## 2022-01-01 RX ADMIN — SODIUM CHLORIDE 4 MILLILITER(S): 9 INJECTION INTRAMUSCULAR; INTRAVENOUS; SUBCUTANEOUS at 23:13

## 2022-01-01 RX ADMIN — LEVETIRACETAM 750 MILLIGRAM(S): 250 TABLET, FILM COATED ORAL at 05:18

## 2022-01-01 RX ADMIN — SENNA PLUS 2 TABLET(S): 8.6 TABLET ORAL at 22:28

## 2022-01-01 RX ADMIN — CHLORHEXIDINE GLUCONATE 1 APPLICATION(S): 213 SOLUTION TOPICAL at 06:02

## 2022-01-01 RX ADMIN — SODIUM CHLORIDE 4 MILLILITER(S): 9 INJECTION INTRAMUSCULAR; INTRAVENOUS; SUBCUTANEOUS at 23:01

## 2022-01-01 RX ADMIN — Medication 5 MILLIGRAM(S): at 22:59

## 2022-01-01 RX ADMIN — Medication 4 MILLILITER(S): at 06:43

## 2022-01-01 RX ADMIN — CHLORHEXIDINE GLUCONATE 1 APPLICATION(S): 213 SOLUTION TOPICAL at 06:11

## 2022-01-01 RX ADMIN — Medication 800 MILLIGRAM(S): at 11:17

## 2022-01-01 RX ADMIN — Medication 3 MILLILITER(S): at 17:14

## 2022-01-01 RX ADMIN — LEVETIRACETAM 750 MILLIGRAM(S): 250 TABLET, FILM COATED ORAL at 17:19

## 2022-01-01 RX ADMIN — Medication 1 DROP(S): at 00:19

## 2022-01-01 RX ADMIN — Medication 3 MILLIGRAM(S): at 22:21

## 2022-01-01 RX ADMIN — Medication 3 MILLILITER(S): at 06:18

## 2022-01-01 RX ADMIN — ENOXAPARIN SODIUM 40 MILLIGRAM(S): 100 INJECTION SUBCUTANEOUS at 17:53

## 2022-01-01 RX ADMIN — Medication 3 MILLILITER(S): at 17:51

## 2022-01-01 RX ADMIN — LEVETIRACETAM 750 MILLIGRAM(S): 250 TABLET, FILM COATED ORAL at 06:14

## 2022-01-01 RX ADMIN — SODIUM CHLORIDE 4 MILLILITER(S): 9 INJECTION INTRAMUSCULAR; INTRAVENOUS; SUBCUTANEOUS at 23:34

## 2022-01-01 RX ADMIN — Medication 75 MICROGRAM(S): at 06:10

## 2022-01-01 RX ADMIN — SODIUM CHLORIDE 4 MILLILITER(S): 9 INJECTION INTRAMUSCULAR; INTRAVENOUS; SUBCUTANEOUS at 05:04

## 2022-01-01 RX ADMIN — Medication 4 MILLILITER(S): at 11:44

## 2022-01-01 RX ADMIN — Medication 4 MILLILITER(S): at 05:27

## 2022-01-01 RX ADMIN — Medication 3 MILLILITER(S): at 18:21

## 2022-01-01 RX ADMIN — Medication 300 MILLIGRAM(S): at 12:27

## 2022-01-01 RX ADMIN — SENNA PLUS 2 TABLET(S): 8.6 TABLET ORAL at 21:25

## 2022-01-01 RX ADMIN — Medication 250 MILLIGRAM(S): at 22:05

## 2022-01-01 RX ADMIN — Medication 0.5 MILLIGRAM(S): at 17:35

## 2022-01-01 RX ADMIN — AMLODIPINE BESYLATE 10 MILLIGRAM(S): 2.5 TABLET ORAL at 18:22

## 2022-01-01 RX ADMIN — Medication 800 MILLIGRAM(S): at 11:53

## 2022-01-01 RX ADMIN — Medication 3 MILLILITER(S): at 06:10

## 2022-01-01 RX ADMIN — SODIUM CHLORIDE 4 MILLILITER(S): 9 INJECTION INTRAMUSCULAR; INTRAVENOUS; SUBCUTANEOUS at 17:33

## 2022-01-01 RX ADMIN — Medication 2 PUFF(S): at 07:32

## 2022-01-01 RX ADMIN — Medication 3 MILLILITER(S): at 05:24

## 2022-01-01 RX ADMIN — Medication 1 DROP(S): at 15:31

## 2022-01-01 RX ADMIN — Medication 30 MILLIGRAM(S): at 05:38

## 2022-01-01 RX ADMIN — Medication 1 DROP(S): at 13:21

## 2022-01-01 RX ADMIN — ERTAPENEM SODIUM 120 MILLIGRAM(S): 1 INJECTION, POWDER, LYOPHILIZED, FOR SOLUTION INTRAMUSCULAR; INTRAVENOUS at 10:28

## 2022-01-01 RX ADMIN — HEPARIN SODIUM 5000 UNIT(S): 5000 INJECTION INTRAVENOUS; SUBCUTANEOUS at 21:43

## 2022-01-01 RX ADMIN — Medication 300 MILLIGRAM(S): at 05:21

## 2022-01-01 RX ADMIN — Medication 4 MILLILITER(S): at 05:31

## 2022-01-01 RX ADMIN — Medication 3 MILLILITER(S): at 00:41

## 2022-01-01 RX ADMIN — Medication 300 MILLIGRAM(S): at 18:04

## 2022-01-01 RX ADMIN — SODIUM CHLORIDE 4 MILLILITER(S): 9 INJECTION INTRAMUSCULAR; INTRAVENOUS; SUBCUTANEOUS at 17:56

## 2022-01-01 RX ADMIN — Medication 1 TABLET(S): at 08:05

## 2022-01-01 RX ADMIN — Medication 100 MILLIGRAM(S): at 20:25

## 2022-01-01 RX ADMIN — ANASTROZOLE 1 MILLIGRAM(S): 1 TABLET ORAL at 11:28

## 2022-01-01 RX ADMIN — LEVETIRACETAM 750 MILLIGRAM(S): 250 TABLET, FILM COATED ORAL at 17:50

## 2022-01-01 RX ADMIN — SODIUM CHLORIDE 4 MILLILITER(S): 9 INJECTION INTRAMUSCULAR; INTRAVENOUS; SUBCUTANEOUS at 18:29

## 2022-01-01 RX ADMIN — CHLORHEXIDINE GLUCONATE 1 APPLICATION(S): 213 SOLUTION TOPICAL at 05:04

## 2022-01-01 RX ADMIN — CHLORHEXIDINE GLUCONATE 1 APPLICATION(S): 213 SOLUTION TOPICAL at 06:53

## 2022-01-01 RX ADMIN — Medication 3 MILLILITER(S): at 00:32

## 2022-01-01 RX ADMIN — ENOXAPARIN SODIUM 40 MILLIGRAM(S): 100 INJECTION SUBCUTANEOUS at 21:51

## 2022-01-01 RX ADMIN — ATORVASTATIN CALCIUM 20 MILLIGRAM(S): 80 TABLET, FILM COATED ORAL at 21:56

## 2022-01-01 RX ADMIN — Medication 300 MILLIGRAM(S): at 18:37

## 2022-01-01 RX ADMIN — Medication 3: at 12:19

## 2022-01-01 RX ADMIN — LEVETIRACETAM 750 MILLIGRAM(S): 250 TABLET, FILM COATED ORAL at 21:45

## 2022-01-01 RX ADMIN — Medication 45 MILLIGRAM(S): at 06:39

## 2022-01-01 RX ADMIN — Medication 20 MILLIGRAM(S): at 12:30

## 2022-01-01 RX ADMIN — Medication 100 MILLIGRAM(S): at 08:04

## 2022-01-01 RX ADMIN — Medication 250 MILLIGRAM(S): at 20:08

## 2022-01-01 RX ADMIN — Medication 3 MILLILITER(S): at 07:10

## 2022-01-01 RX ADMIN — Medication 300 MILLIGRAM(S): at 23:01

## 2022-01-01 RX ADMIN — Medication 75 MICROGRAM(S): at 05:22

## 2022-01-01 RX ADMIN — CHLORHEXIDINE GLUCONATE 1 APPLICATION(S): 213 SOLUTION TOPICAL at 05:50

## 2022-01-01 RX ADMIN — HEPARIN SODIUM 5000 UNIT(S): 5000 INJECTION INTRAVENOUS; SUBCUTANEOUS at 13:00

## 2022-01-01 RX ADMIN — ENOXAPARIN SODIUM 40 MILLIGRAM(S): 100 INJECTION SUBCUTANEOUS at 13:07

## 2022-01-01 RX ADMIN — Medication 3 MILLILITER(S): at 11:06

## 2022-01-01 RX ADMIN — NYSTATIN CREAM 1 APPLICATION(S): 100000 CREAM TOPICAL at 17:24

## 2022-01-01 RX ADMIN — Medication 30 MILLIGRAM(S): at 00:53

## 2022-01-01 RX ADMIN — Medication 650 MILLIGRAM(S): at 12:01

## 2022-01-01 RX ADMIN — ERTAPENEM SODIUM 120 MILLIGRAM(S): 1 INJECTION, POWDER, LYOPHILIZED, FOR SOLUTION INTRAMUSCULAR; INTRAVENOUS at 13:17

## 2022-01-01 RX ADMIN — Medication 2: at 12:09

## 2022-01-01 RX ADMIN — Medication 300 MILLIGRAM(S): at 23:31

## 2022-01-01 RX ADMIN — Medication 75 MICROGRAM(S): at 08:05

## 2022-01-01 RX ADMIN — ATORVASTATIN CALCIUM 20 MILLIGRAM(S): 80 TABLET, FILM COATED ORAL at 21:30

## 2022-01-01 RX ADMIN — SODIUM CHLORIDE 4 MILLILITER(S): 9 INJECTION INTRAMUSCULAR; INTRAVENOUS; SUBCUTANEOUS at 17:15

## 2022-01-01 RX ADMIN — Medication 45 MILLIGRAM(S): at 05:56

## 2022-01-01 RX ADMIN — NYSTATIN CREAM 1 APPLICATION(S): 100000 CREAM TOPICAL at 17:39

## 2022-01-01 RX ADMIN — ERTAPENEM SODIUM 120 MILLIGRAM(S): 1 INJECTION, POWDER, LYOPHILIZED, FOR SOLUTION INTRAMUSCULAR; INTRAVENOUS at 14:26

## 2022-01-01 RX ADMIN — Medication 300 MILLIGRAM(S): at 00:28

## 2022-01-01 RX ADMIN — Medication 3 MILLILITER(S): at 17:17

## 2022-01-01 RX ADMIN — Medication 1 DROP(S): at 05:16

## 2022-01-01 RX ADMIN — Medication 20 MILLIGRAM(S): at 01:43

## 2022-01-01 RX ADMIN — TRAMADOL HYDROCHLORIDE 50 MILLIGRAM(S): 50 TABLET ORAL at 05:56

## 2022-01-01 RX ADMIN — NYSTATIN CREAM 1 APPLICATION(S): 100000 CREAM TOPICAL at 05:16

## 2022-01-01 RX ADMIN — Medication 20 MILLIGRAM(S): at 07:10

## 2022-01-01 RX ADMIN — ATORVASTATIN CALCIUM 20 MILLIGRAM(S): 80 TABLET, FILM COATED ORAL at 21:25

## 2022-01-01 RX ADMIN — Medication 400 MILLIGRAM(S): at 15:18

## 2022-01-01 RX ADMIN — Medication 400 MILLIGRAM(S): at 17:41

## 2022-01-01 RX ADMIN — Medication 125 MICROGRAM(S): at 06:07

## 2022-01-01 RX ADMIN — Medication 1 DROP(S): at 06:36

## 2022-01-01 RX ADMIN — Medication 3 MILLILITER(S): at 17:28

## 2022-01-01 RX ADMIN — Medication 45 MILLIGRAM(S): at 05:51

## 2022-01-01 RX ADMIN — HEPARIN SODIUM 5000 UNIT(S): 5000 INJECTION INTRAVENOUS; SUBCUTANEOUS at 17:36

## 2022-01-01 RX ADMIN — ATORVASTATIN CALCIUM 20 MILLIGRAM(S): 80 TABLET, FILM COATED ORAL at 22:46

## 2022-01-01 RX ADMIN — Medication 650 MILLIGRAM(S): at 12:49

## 2022-01-01 RX ADMIN — Medication 100 MILLIGRAM(S): at 13:14

## 2022-01-01 RX ADMIN — Medication 75 MICROGRAM(S): at 05:50

## 2022-01-01 RX ADMIN — ATORVASTATIN CALCIUM 20 MILLIGRAM(S): 80 TABLET, FILM COATED ORAL at 22:47

## 2022-01-01 RX ADMIN — Medication 3 MILLILITER(S): at 05:52

## 2022-01-01 RX ADMIN — Medication 0.5 MILLILITER(S): at 20:56

## 2022-01-01 RX ADMIN — Medication 1 DROP(S): at 00:27

## 2022-01-01 RX ADMIN — HUMAN INSULIN 9 UNIT(S): 100 INJECTION, SUSPENSION SUBCUTANEOUS at 12:46

## 2022-01-01 RX ADMIN — Medication 3 MILLIGRAM(S): at 23:36

## 2022-01-01 RX ADMIN — HEPARIN SODIUM 5000 UNIT(S): 5000 INJECTION INTRAVENOUS; SUBCUTANEOUS at 21:18

## 2022-01-01 RX ADMIN — Medication 300 MILLIGRAM(S): at 13:13

## 2022-01-01 RX ADMIN — INSULIN HUMAN 6 UNIT(S)/HR: 100 INJECTION, SOLUTION SUBCUTANEOUS at 21:24

## 2022-01-01 RX ADMIN — LEVETIRACETAM 750 MILLIGRAM(S): 250 TABLET, FILM COATED ORAL at 19:05

## 2022-01-01 RX ADMIN — Medication 3 MILLIGRAM(S): at 21:51

## 2022-01-01 RX ADMIN — Medication 0.5 MILLIGRAM(S): at 07:06

## 2022-01-01 RX ADMIN — Medication 300 MILLIGRAM(S): at 23:45

## 2022-01-01 RX ADMIN — HUMAN INSULIN 6 UNIT(S): 100 INJECTION, SUSPENSION SUBCUTANEOUS at 12:49

## 2022-01-01 RX ADMIN — Medication 75 MICROGRAM(S): at 06:19

## 2022-01-01 RX ADMIN — SODIUM CHLORIDE 4 MILLILITER(S): 9 INJECTION INTRAMUSCULAR; INTRAVENOUS; SUBCUTANEOUS at 06:13

## 2022-01-01 RX ADMIN — Medication 40 MILLIEQUIVALENT(S): at 14:56

## 2022-01-01 RX ADMIN — Medication 3 MILLILITER(S): at 23:16

## 2022-01-01 RX ADMIN — Medication 30 MILLIGRAM(S): at 05:54

## 2022-01-01 RX ADMIN — Medication 50 MILLIEQUIVALENT(S): at 21:07

## 2022-01-01 RX ADMIN — SENNA PLUS 2 TABLET(S): 8.6 TABLET ORAL at 21:07

## 2022-01-01 RX ADMIN — LEVETIRACETAM 750 MILLIGRAM(S): 250 TABLET, FILM COATED ORAL at 06:53

## 2022-01-01 RX ADMIN — Medication 3 MILLILITER(S): at 23:03

## 2022-01-01 RX ADMIN — Medication 300 MILLIGRAM(S): at 17:52

## 2022-01-01 RX ADMIN — Medication 30 MILLIGRAM(S): at 11:13

## 2022-01-01 RX ADMIN — Medication 300 MILLIGRAM(S): at 11:47

## 2022-01-01 RX ADMIN — Medication 300 MILLIGRAM(S): at 18:08

## 2022-01-01 RX ADMIN — Medication 4 MILLILITER(S): at 23:28

## 2022-01-01 RX ADMIN — Medication 1: at 08:29

## 2022-01-01 RX ADMIN — Medication 1 DROP(S): at 21:20

## 2022-01-01 RX ADMIN — Medication 1: at 06:21

## 2022-01-01 RX ADMIN — Medication 3 MILLILITER(S): at 06:34

## 2022-01-01 RX ADMIN — Medication 1: at 17:50

## 2022-01-01 RX ADMIN — LEVETIRACETAM 750 MILLIGRAM(S): 250 TABLET, FILM COATED ORAL at 05:52

## 2022-01-01 RX ADMIN — SODIUM CHLORIDE 4 MILLILITER(S): 9 INJECTION INTRAMUSCULAR; INTRAVENOUS; SUBCUTANEOUS at 23:26

## 2022-01-01 RX ADMIN — Medication 300 MILLIGRAM(S): at 06:28

## 2022-01-01 RX ADMIN — HEPARIN SODIUM 5000 UNIT(S): 5000 INJECTION INTRAVENOUS; SUBCUTANEOUS at 18:01

## 2022-01-01 RX ADMIN — Medication 650 MILLIGRAM(S): at 06:46

## 2022-01-01 RX ADMIN — Medication 1 TABLET(S): at 12:01

## 2022-01-01 RX ADMIN — LEVETIRACETAM 750 MILLIGRAM(S): 250 TABLET, FILM COATED ORAL at 05:49

## 2022-01-01 RX ADMIN — Medication 3 MILLILITER(S): at 18:43

## 2022-01-01 RX ADMIN — ATORVASTATIN CALCIUM 20 MILLIGRAM(S): 80 TABLET, FILM COATED ORAL at 22:21

## 2022-01-01 RX ADMIN — Medication 0.5 MILLIGRAM(S): at 17:41

## 2022-01-01 RX ADMIN — RIVAROXABAN 10 MILLIGRAM(S): KIT at 11:53

## 2022-01-01 RX ADMIN — Medication 250 MILLIGRAM(S): at 23:38

## 2022-01-01 RX ADMIN — SODIUM CHLORIDE 4 MILLILITER(S): 9 INJECTION INTRAMUSCULAR; INTRAVENOUS; SUBCUTANEOUS at 12:24

## 2022-01-01 RX ADMIN — Medication 3 MILLILITER(S): at 05:50

## 2022-01-01 RX ADMIN — Medication 2: at 18:07

## 2022-01-01 RX ADMIN — Medication 0.5 MILLIGRAM(S): at 06:38

## 2022-01-01 RX ADMIN — Medication 3 MILLILITER(S): at 23:38

## 2022-01-01 RX ADMIN — CHLORHEXIDINE GLUCONATE 1 APPLICATION(S): 213 SOLUTION TOPICAL at 05:45

## 2022-01-01 RX ADMIN — Medication 300 MILLIGRAM(S): at 06:08

## 2022-01-01 RX ADMIN — Medication 3 MILLIGRAM(S): at 21:46

## 2022-01-01 RX ADMIN — POLYETHYLENE GLYCOL 3350 17 GRAM(S): 17 POWDER, FOR SOLUTION ORAL at 06:09

## 2022-01-01 RX ADMIN — Medication 20 MILLIGRAM(S): at 18:14

## 2022-01-01 RX ADMIN — HEPARIN SODIUM 5000 UNIT(S): 5000 INJECTION INTRAVENOUS; SUBCUTANEOUS at 05:51

## 2022-01-01 RX ADMIN — RIVAROXABAN 20 MILLIGRAM(S): KIT at 18:00

## 2022-01-01 RX ADMIN — ESCITALOPRAM OXALATE 10 MILLIGRAM(S): 10 TABLET, FILM COATED ORAL at 12:00

## 2022-01-01 RX ADMIN — HEPARIN SODIUM 5000 UNIT(S): 5000 INJECTION INTRAVENOUS; SUBCUTANEOUS at 17:48

## 2022-01-01 RX ADMIN — RIVAROXABAN 10 MILLIGRAM(S): KIT at 14:14

## 2022-01-01 RX ADMIN — CHLORHEXIDINE GLUCONATE 1 APPLICATION(S): 213 SOLUTION TOPICAL at 07:00

## 2022-01-01 RX ADMIN — HUMAN INSULIN 7 UNIT(S): 100 INJECTION, SUSPENSION SUBCUTANEOUS at 06:21

## 2022-01-01 RX ADMIN — Medication 4 MILLILITER(S): at 14:11

## 2022-01-01 RX ADMIN — Medication 0: at 08:09

## 2022-01-01 RX ADMIN — Medication 650 MILLIGRAM(S): at 18:49

## 2022-01-01 RX ADMIN — Medication 400 MILLIGRAM(S): at 21:14

## 2022-01-01 RX ADMIN — HEPARIN SODIUM 5000 UNIT(S): 5000 INJECTION INTRAVENOUS; SUBCUTANEOUS at 05:28

## 2022-01-01 RX ADMIN — SODIUM CHLORIDE 4 MILLILITER(S): 9 INJECTION INTRAMUSCULAR; INTRAVENOUS; SUBCUTANEOUS at 05:46

## 2022-01-01 RX ADMIN — TRAMADOL HYDROCHLORIDE 50 MILLIGRAM(S): 50 TABLET ORAL at 21:27

## 2022-01-01 RX ADMIN — Medication 20 MILLIGRAM(S): at 11:13

## 2022-01-01 RX ADMIN — ANASTROZOLE 1 MILLIGRAM(S): 1 TABLET ORAL at 12:53

## 2022-01-01 RX ADMIN — ATORVASTATIN CALCIUM 20 MILLIGRAM(S): 80 TABLET, FILM COATED ORAL at 22:07

## 2022-01-01 RX ADMIN — Medication 30 MILLIGRAM(S): at 06:30

## 2022-01-01 RX ADMIN — Medication 3 MILLILITER(S): at 18:01

## 2022-01-01 RX ADMIN — HEPARIN SODIUM 5000 UNIT(S): 5000 INJECTION INTRAVENOUS; SUBCUTANEOUS at 06:01

## 2022-01-01 RX ADMIN — Medication 3 MILLILITER(S): at 17:36

## 2022-01-01 RX ADMIN — Medication 3 MILLILITER(S): at 17:33

## 2022-01-01 RX ADMIN — LEVETIRACETAM 750 MILLIGRAM(S): 250 TABLET, FILM COATED ORAL at 05:51

## 2022-01-01 RX ADMIN — Medication 3 MILLILITER(S): at 17:43

## 2022-01-01 RX ADMIN — Medication 50 MILLIGRAM(S): at 11:47

## 2022-01-01 RX ADMIN — Medication 4 MILLILITER(S): at 18:33

## 2022-01-01 RX ADMIN — Medication 75 MICROGRAM(S): at 07:05

## 2022-01-01 RX ADMIN — RIVAROXABAN 10 MILLIGRAM(S): KIT at 11:58

## 2022-01-01 RX ADMIN — SODIUM CHLORIDE 4 MILLILITER(S): 9 INJECTION INTRAMUSCULAR; INTRAVENOUS; SUBCUTANEOUS at 12:17

## 2022-01-01 RX ADMIN — Medication 75 MICROGRAM(S): at 05:31

## 2022-01-01 RX ADMIN — Medication 1 DROP(S): at 21:10

## 2022-01-01 RX ADMIN — SODIUM CHLORIDE 4 MILLILITER(S): 9 INJECTION INTRAMUSCULAR; INTRAVENOUS; SUBCUTANEOUS at 11:46

## 2022-01-01 RX ADMIN — Medication 30 MILLIGRAM(S): at 23:34

## 2022-01-01 RX ADMIN — ESCITALOPRAM OXALATE 10 MILLIGRAM(S): 10 TABLET, FILM COATED ORAL at 12:27

## 2022-01-01 RX ADMIN — Medication 300 MILLIGRAM(S): at 13:43

## 2022-01-01 RX ADMIN — Medication 0.5 MILLIGRAM(S): at 08:13

## 2022-01-01 RX ADMIN — HUMAN INSULIN 7 UNIT(S): 100 INJECTION, SUSPENSION SUBCUTANEOUS at 12:18

## 2022-01-01 RX ADMIN — Medication 0.5 MILLIGRAM(S): at 06:20

## 2022-01-01 RX ADMIN — Medication 3 MILLILITER(S): at 13:08

## 2022-01-01 RX ADMIN — Medication 3 MILLILITER(S): at 11:53

## 2022-01-01 RX ADMIN — Medication 75 MICROGRAM(S): at 06:31

## 2022-01-01 RX ADMIN — LEVETIRACETAM 750 MILLIGRAM(S): 250 TABLET, FILM COATED ORAL at 17:57

## 2022-01-01 RX ADMIN — Medication 300 MILLIGRAM(S): at 11:16

## 2022-01-01 RX ADMIN — Medication 300 MILLIGRAM(S): at 00:45

## 2022-01-01 RX ADMIN — Medication 300 MILLIGRAM(S): at 12:58

## 2022-01-01 RX ADMIN — Medication 25 MILLIGRAM(S): at 05:35

## 2022-01-01 RX ADMIN — Medication 4 MILLILITER(S): at 00:01

## 2022-01-01 RX ADMIN — Medication 25 MILLIGRAM(S): at 20:34

## 2022-01-01 RX ADMIN — Medication 3 MILLILITER(S): at 06:27

## 2022-01-01 RX ADMIN — Medication 3 MILLILITER(S): at 11:47

## 2022-01-01 RX ADMIN — Medication 3 MILLILITER(S): at 18:33

## 2022-01-01 RX ADMIN — Medication 0.5 MILLIGRAM(S): at 17:26

## 2022-01-01 RX ADMIN — Medication 20 MILLIGRAM(S): at 19:35

## 2022-01-01 RX ADMIN — HEPARIN SODIUM 5000 UNIT(S): 5000 INJECTION INTRAVENOUS; SUBCUTANEOUS at 14:10

## 2022-01-01 RX ADMIN — LEVETIRACETAM 750 MILLIGRAM(S): 250 TABLET, FILM COATED ORAL at 09:50

## 2022-01-01 RX ADMIN — Medication 50 MILLIGRAM(S): at 18:12

## 2022-01-01 RX ADMIN — LEVETIRACETAM 750 MILLIGRAM(S): 250 TABLET, FILM COATED ORAL at 06:01

## 2022-01-01 RX ADMIN — Medication 650 MILLIGRAM(S): at 06:56

## 2022-01-01 RX ADMIN — Medication 300 MILLIGRAM(S): at 17:42

## 2022-01-01 RX ADMIN — HEPARIN SODIUM 5000 UNIT(S): 5000 INJECTION INTRAVENOUS; SUBCUTANEOUS at 21:05

## 2022-01-01 RX ADMIN — Medication 100 MILLIGRAM(S): at 06:12

## 2022-01-01 RX ADMIN — Medication 650 MILLIGRAM(S): at 00:06

## 2022-01-01 RX ADMIN — SODIUM CHLORIDE 4 MILLILITER(S): 9 INJECTION INTRAMUSCULAR; INTRAVENOUS; SUBCUTANEOUS at 05:33

## 2022-01-01 RX ADMIN — ERTAPENEM SODIUM 120 MILLIGRAM(S): 1 INJECTION, POWDER, LYOPHILIZED, FOR SOLUTION INTRAMUSCULAR; INTRAVENOUS at 14:48

## 2022-01-01 RX ADMIN — Medication 300 MILLIGRAM(S): at 05:44

## 2022-01-01 RX ADMIN — Medication 30 MILLIGRAM(S): at 22:21

## 2022-01-01 RX ADMIN — Medication 1 DROP(S): at 17:26

## 2022-01-01 RX ADMIN — Medication 300 MILLIGRAM(S): at 06:35

## 2022-01-01 RX ADMIN — Medication 300 MILLIGRAM(S): at 05:01

## 2022-01-01 RX ADMIN — HEPARIN SODIUM 5000 UNIT(S): 5000 INJECTION INTRAVENOUS; SUBCUTANEOUS at 17:52

## 2022-01-01 RX ADMIN — LEVETIRACETAM 500 MILLIGRAM(S): 250 TABLET, FILM COATED ORAL at 17:50

## 2022-01-01 RX ADMIN — Medication 3 MILLILITER(S): at 18:30

## 2022-01-01 RX ADMIN — Medication 3 MILLIGRAM(S): at 21:25

## 2022-01-01 RX ADMIN — Medication 1 TABLET(S): at 11:44

## 2022-01-01 RX ADMIN — NYSTATIN CREAM 1 APPLICATION(S): 100000 CREAM TOPICAL at 06:09

## 2022-01-01 RX ADMIN — SODIUM CHLORIDE 4 MILLILITER(S): 9 INJECTION INTRAMUSCULAR; INTRAVENOUS; SUBCUTANEOUS at 18:10

## 2022-01-01 RX ADMIN — Medication 30 MILLIGRAM(S): at 14:43

## 2022-01-01 RX ADMIN — Medication 3 MILLILITER(S): at 23:34

## 2022-01-01 RX ADMIN — Medication 4 MILLILITER(S): at 17:50

## 2022-01-01 RX ADMIN — SODIUM CHLORIDE 80 MILLILITER(S): 9 INJECTION, SOLUTION INTRAVENOUS at 20:48

## 2022-01-01 RX ADMIN — Medication 25 MILLIGRAM(S): at 21:14

## 2022-01-01 RX ADMIN — TRAMADOL HYDROCHLORIDE 50 MILLIGRAM(S): 50 TABLET ORAL at 03:22

## 2022-01-01 RX ADMIN — Medication 1: at 18:12

## 2022-01-01 RX ADMIN — Medication 50 MILLIGRAM(S): at 06:04

## 2022-01-01 RX ADMIN — Medication 3 MILLILITER(S): at 13:58

## 2022-01-01 RX ADMIN — Medication 45 MILLIGRAM(S): at 05:37

## 2022-01-01 RX ADMIN — Medication 30 MILLIGRAM(S): at 17:34

## 2022-01-01 RX ADMIN — Medication 3 MILLIGRAM(S): at 22:12

## 2022-01-01 RX ADMIN — Medication 3 MILLILITER(S): at 23:04

## 2022-01-01 RX ADMIN — TRAMADOL HYDROCHLORIDE 50 MILLIGRAM(S): 50 TABLET ORAL at 10:52

## 2022-01-01 RX ADMIN — Medication 75 MICROGRAM(S): at 05:28

## 2022-01-01 RX ADMIN — LEVETIRACETAM 750 MILLIGRAM(S): 250 TABLET, FILM COATED ORAL at 06:09

## 2022-01-01 RX ADMIN — Medication 1000 MILLIGRAM(S): at 00:45

## 2022-01-01 RX ADMIN — Medication 650 MILLIGRAM(S): at 21:58

## 2022-01-01 RX ADMIN — Medication 500 MILLIGRAM(S): at 09:32

## 2022-01-01 RX ADMIN — HEPARIN SODIUM 5000 UNIT(S): 5000 INJECTION INTRAVENOUS; SUBCUTANEOUS at 21:28

## 2022-01-01 RX ADMIN — SODIUM CHLORIDE 4 MILLILITER(S): 9 INJECTION INTRAMUSCULAR; INTRAVENOUS; SUBCUTANEOUS at 17:36

## 2022-01-01 RX ADMIN — Medication 300 MILLIGRAM(S): at 12:51

## 2022-01-01 RX ADMIN — SENNA PLUS 2 TABLET(S): 8.6 TABLET ORAL at 22:16

## 2022-01-01 RX ADMIN — Medication 20 MILLIGRAM(S): at 08:11

## 2022-01-01 RX ADMIN — SODIUM CHLORIDE 4 MILLILITER(S): 9 INJECTION INTRAMUSCULAR; INTRAVENOUS; SUBCUTANEOUS at 07:45

## 2022-01-01 RX ADMIN — Medication 1 DROP(S): at 23:50

## 2022-01-01 RX ADMIN — HEPARIN SODIUM 5000 UNIT(S): 5000 INJECTION INTRAVENOUS; SUBCUTANEOUS at 13:25

## 2022-01-01 RX ADMIN — LEVETIRACETAM 750 MILLIGRAM(S): 250 TABLET, FILM COATED ORAL at 06:15

## 2022-01-01 RX ADMIN — SODIUM CHLORIDE 4 MILLILITER(S): 9 INJECTION INTRAMUSCULAR; INTRAVENOUS; SUBCUTANEOUS at 18:20

## 2022-01-01 RX ADMIN — TRAMADOL HYDROCHLORIDE 50 MILLIGRAM(S): 50 TABLET ORAL at 12:26

## 2022-01-01 RX ADMIN — HEPARIN SODIUM 5000 UNIT(S): 5000 INJECTION INTRAVENOUS; SUBCUTANEOUS at 16:52

## 2022-01-01 RX ADMIN — Medication 300 MILLIGRAM(S): at 17:01

## 2022-01-01 RX ADMIN — SODIUM CHLORIDE 4 MILLILITER(S): 9 INJECTION INTRAMUSCULAR; INTRAVENOUS; SUBCUTANEOUS at 13:37

## 2022-01-01 RX ADMIN — Medication 30 MILLIGRAM(S): at 11:47

## 2022-01-01 RX ADMIN — Medication 650 MILLIGRAM(S): at 22:03

## 2022-01-01 RX ADMIN — Medication 300 MILLIGRAM(S): at 17:57

## 2022-01-01 RX ADMIN — Medication 650 MILLIGRAM(S): at 14:15

## 2022-01-01 RX ADMIN — ANASTROZOLE 1 MILLIGRAM(S): 1 TABLET ORAL at 12:25

## 2022-01-01 RX ADMIN — CEFEPIME 100 MILLIGRAM(S): 1 INJECTION, POWDER, FOR SOLUTION INTRAMUSCULAR; INTRAVENOUS at 22:47

## 2022-01-01 RX ADMIN — Medication 75 MICROGRAM(S): at 06:35

## 2022-01-01 RX ADMIN — Medication 3 MILLILITER(S): at 13:36

## 2022-01-01 RX ADMIN — Medication 300 MILLIGRAM(S): at 05:13

## 2022-01-01 RX ADMIN — LEVETIRACETAM 750 MILLIGRAM(S): 250 TABLET, FILM COATED ORAL at 08:38

## 2022-01-01 RX ADMIN — Medication 2 PUFF(S): at 17:04

## 2022-01-01 RX ADMIN — Medication 3 MILLIGRAM(S): at 22:23

## 2022-01-01 RX ADMIN — Medication 1: at 00:43

## 2022-01-01 RX ADMIN — LEVETIRACETAM 750 MILLIGRAM(S): 250 TABLET, FILM COATED ORAL at 17:15

## 2022-01-01 RX ADMIN — Medication 650 MILLIGRAM(S): at 01:25

## 2022-01-01 RX ADMIN — PIPERACILLIN AND TAZOBACTAM 25 GRAM(S): 4; .5 INJECTION, POWDER, LYOPHILIZED, FOR SOLUTION INTRAVENOUS at 01:23

## 2022-01-01 RX ADMIN — LEVETIRACETAM 750 MILLIGRAM(S): 250 TABLET, FILM COATED ORAL at 11:40

## 2022-01-01 RX ADMIN — Medication 30 MILLIGRAM(S): at 22:24

## 2022-01-01 RX ADMIN — SENNA PLUS 2 TABLET(S): 8.6 TABLET ORAL at 22:41

## 2022-01-01 RX ADMIN — SODIUM CHLORIDE 4 MILLILITER(S): 9 INJECTION INTRAMUSCULAR; INTRAVENOUS; SUBCUTANEOUS at 23:44

## 2022-01-01 RX ADMIN — Medication 650 MILLIGRAM(S): at 06:08

## 2022-01-01 RX ADMIN — ATORVASTATIN CALCIUM 20 MILLIGRAM(S): 80 TABLET, FILM COATED ORAL at 21:28

## 2022-01-01 RX ADMIN — Medication 3 MILLILITER(S): at 23:41

## 2022-01-01 RX ADMIN — LEVETIRACETAM 750 MILLIGRAM(S): 250 TABLET, FILM COATED ORAL at 18:26

## 2022-01-01 RX ADMIN — HUMAN INSULIN 7 UNIT(S): 100 INJECTION, SUSPENSION SUBCUTANEOUS at 12:50

## 2022-01-01 RX ADMIN — SODIUM CHLORIDE 4 MILLILITER(S): 9 INJECTION INTRAMUSCULAR; INTRAVENOUS; SUBCUTANEOUS at 07:10

## 2022-01-01 RX ADMIN — SENNA PLUS 2 TABLET(S): 8.6 TABLET ORAL at 21:24

## 2022-01-01 RX ADMIN — Medication 2: at 00:33

## 2022-01-01 RX ADMIN — Medication 25 MILLIGRAM(S): at 09:47

## 2022-01-01 RX ADMIN — ATORVASTATIN CALCIUM 20 MILLIGRAM(S): 80 TABLET, FILM COATED ORAL at 21:14

## 2022-01-01 RX ADMIN — Medication 250 MICROGRAM(S): at 23:06

## 2022-01-01 RX ADMIN — Medication 3 MILLILITER(S): at 12:34

## 2022-01-01 RX ADMIN — Medication 650 MILLIGRAM(S): at 12:00

## 2022-01-01 RX ADMIN — Medication 3 MILLILITER(S): at 23:13

## 2022-01-01 RX ADMIN — Medication 100 MILLIGRAM(S): at 13:18

## 2022-01-01 RX ADMIN — CHLORHEXIDINE GLUCONATE 1 APPLICATION(S): 213 SOLUTION TOPICAL at 06:13

## 2022-01-01 RX ADMIN — Medication 125 MILLIGRAM(S): at 20:59

## 2022-01-01 RX ADMIN — Medication 3 MILLILITER(S): at 05:22

## 2022-01-01 RX ADMIN — Medication 300 MILLIGRAM(S): at 23:20

## 2022-01-01 RX ADMIN — Medication 3 MILLILITER(S): at 00:40

## 2022-01-01 RX ADMIN — Medication 650 MILLIGRAM(S): at 05:53

## 2022-01-01 RX ADMIN — Medication 3 MILLILITER(S): at 23:32

## 2022-01-01 RX ADMIN — SODIUM CHLORIDE 4 MILLILITER(S): 9 INJECTION INTRAMUSCULAR; INTRAVENOUS; SUBCUTANEOUS at 05:51

## 2022-01-01 RX ADMIN — HEPARIN SODIUM 5000 UNIT(S): 5000 INJECTION INTRAVENOUS; SUBCUTANEOUS at 06:29

## 2022-01-01 RX ADMIN — Medication 5 MILLIGRAM(S): at 00:09

## 2022-01-01 RX ADMIN — Medication 30 MILLIGRAM(S): at 22:53

## 2022-01-01 RX ADMIN — Medication 25 MILLIGRAM(S): at 09:20

## 2022-01-01 RX ADMIN — Medication 3 MILLILITER(S): at 12:02

## 2022-01-01 RX ADMIN — Medication 1 DROP(S): at 05:19

## 2022-01-01 RX ADMIN — SODIUM CHLORIDE 4 MILLILITER(S): 9 INJECTION INTRAMUSCULAR; INTRAVENOUS; SUBCUTANEOUS at 11:13

## 2022-01-01 RX ADMIN — PANTOPRAZOLE SODIUM 10 MG/HR: 20 TABLET, DELAYED RELEASE ORAL at 14:40

## 2022-01-01 RX ADMIN — HEPARIN SODIUM 5000 UNIT(S): 5000 INJECTION INTRAVENOUS; SUBCUTANEOUS at 21:08

## 2022-01-01 RX ADMIN — LEVETIRACETAM 750 MILLIGRAM(S): 250 TABLET, FILM COATED ORAL at 10:25

## 2022-01-01 RX ADMIN — SENNA PLUS 2 TABLET(S): 8.6 TABLET ORAL at 22:46

## 2022-01-01 RX ADMIN — Medication 75 MICROGRAM(S): at 05:29

## 2022-01-01 RX ADMIN — Medication 3 MILLILITER(S): at 23:14

## 2022-01-01 RX ADMIN — Medication 650 MILLIGRAM(S): at 12:30

## 2022-01-01 RX ADMIN — ERTAPENEM SODIUM 120 MILLIGRAM(S): 1 INJECTION, POWDER, LYOPHILIZED, FOR SOLUTION INTRAMUSCULAR; INTRAVENOUS at 14:54

## 2022-01-01 RX ADMIN — LEVETIRACETAM 750 MILLIGRAM(S): 250 TABLET, FILM COATED ORAL at 18:01

## 2022-01-01 RX ADMIN — SENNA PLUS 2 TABLET(S): 8.6 TABLET ORAL at 22:56

## 2022-01-01 RX ADMIN — Medication 3 MILLILITER(S): at 12:52

## 2022-01-01 RX ADMIN — Medication 1000 MILLIGRAM(S): at 12:30

## 2022-01-01 RX ADMIN — SODIUM CHLORIDE 4 MILLILITER(S): 9 INJECTION INTRAMUSCULAR; INTRAVENOUS; SUBCUTANEOUS at 06:18

## 2022-01-01 RX ADMIN — Medication 75 MICROGRAM(S): at 05:53

## 2022-01-01 RX ADMIN — Medication 100 MILLIGRAM(S): at 13:05

## 2022-01-01 RX ADMIN — SODIUM CHLORIDE 1000 MILLILITER(S): 9 INJECTION INTRAMUSCULAR; INTRAVENOUS; SUBCUTANEOUS at 21:00

## 2022-01-01 RX ADMIN — Medication 1 DROP(S): at 06:20

## 2022-01-01 RX ADMIN — SODIUM CHLORIDE 4 MILLILITER(S): 9 INJECTION INTRAMUSCULAR; INTRAVENOUS; SUBCUTANEOUS at 17:18

## 2022-01-01 RX ADMIN — Medication 3 MILLILITER(S): at 17:35

## 2022-01-01 RX ADMIN — Medication 30 MILLIGRAM(S): at 08:12

## 2022-01-01 RX ADMIN — LEVETIRACETAM 750 MILLIGRAM(S): 250 TABLET, FILM COATED ORAL at 17:36

## 2022-01-01 RX ADMIN — ATORVASTATIN CALCIUM 20 MILLIGRAM(S): 80 TABLET, FILM COATED ORAL at 21:26

## 2022-01-01 RX ADMIN — Medication 3 MILLILITER(S): at 05:16

## 2022-01-01 RX ADMIN — Medication 3 MILLILITER(S): at 07:11

## 2022-01-01 RX ADMIN — ESCITALOPRAM OXALATE 10 MILLIGRAM(S): 10 TABLET, FILM COATED ORAL at 11:16

## 2022-01-01 RX ADMIN — Medication 4 MILLILITER(S): at 06:22

## 2022-01-01 RX ADMIN — Medication 45 MILLIGRAM(S): at 05:19

## 2022-01-01 RX ADMIN — Medication 75 MICROGRAM(S): at 05:18

## 2022-01-01 RX ADMIN — Medication 300 MILLIGRAM(S): at 23:12

## 2022-01-01 RX ADMIN — HEPARIN SODIUM 5000 UNIT(S): 5000 INJECTION INTRAVENOUS; SUBCUTANEOUS at 14:03

## 2022-01-01 RX ADMIN — Medication 300 MILLIGRAM(S): at 17:48

## 2022-01-01 RX ADMIN — Medication 3 MILLILITER(S): at 23:01

## 2022-01-01 RX ADMIN — Medication 1 DROP(S): at 13:09

## 2022-01-01 RX ADMIN — Medication 3 MILLILITER(S): at 13:01

## 2022-01-01 RX ADMIN — Medication 4 MILLILITER(S): at 17:36

## 2022-01-01 RX ADMIN — Medication 1: at 00:37

## 2022-01-01 RX ADMIN — LEVETIRACETAM 750 MILLIGRAM(S): 250 TABLET, FILM COATED ORAL at 05:29

## 2022-01-01 RX ADMIN — Medication 250 MILLIGRAM(S): at 21:46

## 2022-01-01 RX ADMIN — Medication 2 PUFF(S): at 12:19

## 2022-01-01 RX ADMIN — CEFTRIAXONE 100 MILLIGRAM(S): 500 INJECTION, POWDER, FOR SOLUTION INTRAMUSCULAR; INTRAVENOUS at 12:09

## 2022-01-01 RX ADMIN — Medication 125 MICROGRAM(S): at 12:48

## 2022-01-01 RX ADMIN — Medication 300 MILLIGRAM(S): at 23:24

## 2022-01-01 RX ADMIN — LEVETIRACETAM 500 MILLIGRAM(S): 250 TABLET, FILM COATED ORAL at 05:53

## 2022-01-01 RX ADMIN — HEPARIN SODIUM 5000 UNIT(S): 5000 INJECTION INTRAVENOUS; SUBCUTANEOUS at 22:46

## 2022-01-01 RX ADMIN — Medication 650 MILLIGRAM(S): at 22:21

## 2022-01-01 RX ADMIN — POLYETHYLENE GLYCOL 3350 17 GRAM(S): 17 POWDER, FOR SOLUTION ORAL at 14:52

## 2022-01-01 RX ADMIN — Medication 1 DROP(S): at 14:54

## 2022-01-01 RX ADMIN — Medication 1: at 06:06

## 2022-01-01 RX ADMIN — Medication 2: at 11:50

## 2022-01-01 RX ADMIN — RIVAROXABAN 20 MILLIGRAM(S): KIT at 18:08

## 2022-01-01 RX ADMIN — Medication 650 MILLIGRAM(S): at 17:19

## 2022-01-01 RX ADMIN — ERTAPENEM SODIUM 120 MILLIGRAM(S): 1 INJECTION, POWDER, LYOPHILIZED, FOR SOLUTION INTRAMUSCULAR; INTRAVENOUS at 13:58

## 2022-01-01 RX ADMIN — ALBUTEROL 2 PUFF(S): 90 AEROSOL, METERED ORAL at 11:14

## 2022-01-01 RX ADMIN — Medication 1 DROP(S): at 22:03

## 2022-01-01 RX ADMIN — Medication 5 MILLIGRAM(S): at 21:17

## 2022-01-01 RX ADMIN — ATORVASTATIN CALCIUM 20 MILLIGRAM(S): 80 TABLET, FILM COATED ORAL at 21:18

## 2022-01-01 RX ADMIN — Medication 3 MILLILITER(S): at 17:18

## 2022-01-01 RX ADMIN — Medication 3 MILLILITER(S): at 05:13

## 2022-01-01 RX ADMIN — Medication 1 DROP(S): at 06:42

## 2022-01-01 RX ADMIN — Medication 3 MILLILITER(S): at 13:54

## 2022-01-01 RX ADMIN — Medication 650 MILLIGRAM(S): at 14:36

## 2022-01-01 RX ADMIN — TRAMADOL HYDROCHLORIDE 50 MILLIGRAM(S): 50 TABLET ORAL at 11:56

## 2022-01-01 RX ADMIN — Medication 1 DROP(S): at 05:07

## 2022-01-01 RX ADMIN — LEVETIRACETAM 750 MILLIGRAM(S): 250 TABLET, FILM COATED ORAL at 17:46

## 2022-01-01 RX ADMIN — Medication 30 MILLIGRAM(S): at 12:02

## 2022-01-01 RX ADMIN — Medication 25 MILLIGRAM(S): at 17:36

## 2022-01-01 RX ADMIN — Medication 4 MILLILITER(S): at 14:54

## 2022-01-01 RX ADMIN — LEVETIRACETAM 750 MILLIGRAM(S): 250 TABLET, FILM COATED ORAL at 08:10

## 2022-01-01 RX ADMIN — CHLORHEXIDINE GLUCONATE 1 APPLICATION(S): 213 SOLUTION TOPICAL at 05:55

## 2022-01-01 RX ADMIN — LEVETIRACETAM 400 MILLIGRAM(S): 250 TABLET, FILM COATED ORAL at 05:23

## 2022-01-01 RX ADMIN — LEVETIRACETAM 750 MILLIGRAM(S): 250 TABLET, FILM COATED ORAL at 17:25

## 2022-01-01 RX ADMIN — Medication 650 MILLIGRAM(S): at 18:20

## 2022-01-01 RX ADMIN — Medication 300 MILLIGRAM(S): at 12:35

## 2022-01-01 RX ADMIN — NYSTATIN CREAM 1 APPLICATION(S): 100000 CREAM TOPICAL at 05:46

## 2022-01-01 RX ADMIN — Medication 250 MILLIGRAM(S): at 20:39

## 2022-01-01 RX ADMIN — Medication 300 MILLIGRAM(S): at 12:01

## 2022-01-01 RX ADMIN — Medication 4 MILLILITER(S): at 18:07

## 2022-01-01 RX ADMIN — Medication 650 MILLIGRAM(S): at 14:45

## 2022-01-01 RX ADMIN — Medication 650 MILLIGRAM(S): at 23:35

## 2022-01-01 RX ADMIN — HEPARIN SODIUM 5000 UNIT(S): 5000 INJECTION INTRAVENOUS; SUBCUTANEOUS at 13:14

## 2022-01-01 RX ADMIN — LEVETIRACETAM 750 MILLIGRAM(S): 250 TABLET, FILM COATED ORAL at 22:01

## 2022-01-01 RX ADMIN — Medication 0.5 MILLIGRAM(S): at 06:10

## 2022-01-01 RX ADMIN — SODIUM CHLORIDE 4 MILLILITER(S): 9 INJECTION INTRAMUSCULAR; INTRAVENOUS; SUBCUTANEOUS at 11:43

## 2022-01-01 RX ADMIN — Medication 2: at 16:44

## 2022-01-01 RX ADMIN — NYSTATIN CREAM 1 APPLICATION(S): 100000 CREAM TOPICAL at 17:25

## 2022-01-01 RX ADMIN — Medication 3 MILLILITER(S): at 00:05

## 2022-01-01 RX ADMIN — Medication 45 MILLIGRAM(S): at 05:05

## 2022-01-01 RX ADMIN — ATORVASTATIN CALCIUM 20 MILLIGRAM(S): 80 TABLET, FILM COATED ORAL at 21:13

## 2022-01-01 RX ADMIN — Medication 75 MICROGRAM(S): at 05:46

## 2022-01-01 RX ADMIN — NYSTATIN CREAM 1 APPLICATION(S): 100000 CREAM TOPICAL at 06:28

## 2022-01-01 RX ADMIN — Medication 300 MILLIGRAM(S): at 23:34

## 2022-01-01 RX ADMIN — Medication 4 MILLILITER(S): at 06:15

## 2022-01-01 RX ADMIN — Medication 250 MILLIGRAM(S): at 21:28

## 2022-01-01 RX ADMIN — Medication 20 MILLIGRAM(S): at 23:16

## 2022-01-01 RX ADMIN — ATORVASTATIN CALCIUM 20 MILLIGRAM(S): 80 TABLET, FILM COATED ORAL at 23:36

## 2022-01-01 RX ADMIN — NYSTATIN CREAM 1 APPLICATION(S): 100000 CREAM TOPICAL at 05:54

## 2022-01-01 RX ADMIN — TRAMADOL HYDROCHLORIDE 50 MILLIGRAM(S): 50 TABLET ORAL at 12:19

## 2022-01-01 RX ADMIN — Medication 3 MILLILITER(S): at 05:05

## 2022-01-01 RX ADMIN — Medication 2 PUFF(S): at 17:53

## 2022-01-01 RX ADMIN — Medication 30 MILLIGRAM(S): at 18:37

## 2022-01-01 RX ADMIN — Medication 3 MILLILITER(S): at 18:38

## 2022-01-01 RX ADMIN — Medication 75 MICROGRAM(S): at 08:11

## 2022-01-01 RX ADMIN — SODIUM CHLORIDE 4 MILLILITER(S): 9 INJECTION INTRAMUSCULAR; INTRAVENOUS; SUBCUTANEOUS at 18:33

## 2022-01-01 RX ADMIN — Medication 2: at 17:33

## 2022-01-01 RX ADMIN — ATORVASTATIN CALCIUM 20 MILLIGRAM(S): 80 TABLET, FILM COATED ORAL at 21:11

## 2022-01-01 RX ADMIN — Medication 3 MILLILITER(S): at 23:20

## 2022-01-01 RX ADMIN — CHLORHEXIDINE GLUCONATE 1 APPLICATION(S): 213 SOLUTION TOPICAL at 05:30

## 2022-01-01 RX ADMIN — Medication 75 MICROGRAM(S): at 05:15

## 2022-01-01 RX ADMIN — Medication 1 DROP(S): at 16:54

## 2022-01-01 RX ADMIN — Medication 30 MILLIGRAM(S): at 14:14

## 2022-01-01 RX ADMIN — HUMAN INSULIN 9 UNIT(S): 100 INJECTION, SUSPENSION SUBCUTANEOUS at 11:33

## 2022-01-01 RX ADMIN — HYDROMORPHONE HYDROCHLORIDE 0.25 MILLIGRAM(S): 2 INJECTION INTRAMUSCULAR; INTRAVENOUS; SUBCUTANEOUS at 14:30

## 2022-01-01 RX ADMIN — Medication 1 DROP(S): at 23:03

## 2022-01-01 RX ADMIN — HEPARIN SODIUM 5000 UNIT(S): 5000 INJECTION INTRAVENOUS; SUBCUTANEOUS at 13:07

## 2022-01-01 RX ADMIN — SODIUM CHLORIDE 4 MILLILITER(S): 9 INJECTION INTRAMUSCULAR; INTRAVENOUS; SUBCUTANEOUS at 05:53

## 2022-01-01 RX ADMIN — Medication 650 MILLIGRAM(S): at 00:12

## 2022-01-01 RX ADMIN — PIPERACILLIN AND TAZOBACTAM 25 GRAM(S): 4; .5 INJECTION, POWDER, LYOPHILIZED, FOR SOLUTION INTRAVENOUS at 17:50

## 2022-01-01 RX ADMIN — HEPARIN SODIUM 5000 UNIT(S): 5000 INJECTION INTRAVENOUS; SUBCUTANEOUS at 13:36

## 2022-01-01 RX ADMIN — Medication 50 MILLIGRAM(S): at 06:18

## 2022-01-01 RX ADMIN — NYSTATIN CREAM 1 APPLICATION(S): 100000 CREAM TOPICAL at 17:14

## 2022-01-01 RX ADMIN — Medication 30 MILLIGRAM(S): at 22:02

## 2022-01-01 RX ADMIN — Medication 300 MILLIGRAM(S): at 06:37

## 2022-01-01 RX ADMIN — HYDROMORPHONE HYDROCHLORIDE 0.25 MILLIGRAM(S): 2 INJECTION INTRAMUSCULAR; INTRAVENOUS; SUBCUTANEOUS at 13:34

## 2022-01-01 RX ADMIN — CHLORHEXIDINE GLUCONATE 1 APPLICATION(S): 213 SOLUTION TOPICAL at 06:29

## 2022-01-01 RX ADMIN — SODIUM CHLORIDE 4 MILLILITER(S): 9 INJECTION INTRAMUSCULAR; INTRAVENOUS; SUBCUTANEOUS at 05:27

## 2022-01-01 RX ADMIN — LEVETIRACETAM 750 MILLIGRAM(S): 250 TABLET, FILM COATED ORAL at 05:36

## 2022-01-01 RX ADMIN — HUMAN INSULIN 7 UNIT(S): 100 INJECTION, SUSPENSION SUBCUTANEOUS at 17:55

## 2022-01-01 RX ADMIN — ERTAPENEM SODIUM 120 MILLIGRAM(S): 1 INJECTION, POWDER, LYOPHILIZED, FOR SOLUTION INTRAMUSCULAR; INTRAVENOUS at 14:34

## 2022-01-01 RX ADMIN — Medication 5 MILLIGRAM(S): at 19:23

## 2022-01-01 RX ADMIN — LEVETIRACETAM 750 MILLIGRAM(S): 250 TABLET, FILM COATED ORAL at 23:49

## 2022-01-01 RX ADMIN — Medication 300 MILLIGRAM(S): at 13:48

## 2022-01-01 RX ADMIN — Medication 4 MILLILITER(S): at 13:20

## 2022-01-01 RX ADMIN — SODIUM CHLORIDE 4 MILLILITER(S): 9 INJECTION INTRAMUSCULAR; INTRAVENOUS; SUBCUTANEOUS at 23:14

## 2022-01-01 RX ADMIN — ATORVASTATIN CALCIUM 20 MILLIGRAM(S): 80 TABLET, FILM COATED ORAL at 21:24

## 2022-01-01 RX ADMIN — CHLORHEXIDINE GLUCONATE 1 APPLICATION(S): 213 SOLUTION TOPICAL at 06:09

## 2022-01-01 RX ADMIN — Medication 3 MILLILITER(S): at 23:37

## 2022-01-01 RX ADMIN — Medication 100 MILLIGRAM(S): at 11:53

## 2022-01-01 RX ADMIN — ATORVASTATIN CALCIUM 20 MILLIGRAM(S): 80 TABLET, FILM COATED ORAL at 21:50

## 2022-01-01 RX ADMIN — HEPARIN SODIUM 5000 UNIT(S): 5000 INJECTION INTRAVENOUS; SUBCUTANEOUS at 06:43

## 2022-01-01 RX ADMIN — Medication 3 MILLILITER(S): at 12:47

## 2022-01-01 RX ADMIN — Medication 650 MILLIGRAM(S): at 02:11

## 2022-01-01 RX ADMIN — LEVETIRACETAM 400 MILLIGRAM(S): 250 TABLET, FILM COATED ORAL at 06:01

## 2022-01-01 RX ADMIN — ANASTROZOLE 1 MILLIGRAM(S): 1 TABLET ORAL at 11:53

## 2022-01-01 RX ADMIN — HEPARIN SODIUM 5000 UNIT(S): 5000 INJECTION INTRAVENOUS; SUBCUTANEOUS at 06:34

## 2022-01-01 RX ADMIN — Medication 3 MILLIGRAM(S): at 21:13

## 2022-01-01 RX ADMIN — Medication 3 MILLIGRAM(S): at 22:08

## 2022-01-01 RX ADMIN — Medication 650 MILLIGRAM(S): at 06:01

## 2022-01-01 RX ADMIN — LEVETIRACETAM 750 MILLIGRAM(S): 250 TABLET, FILM COATED ORAL at 06:16

## 2022-01-01 RX ADMIN — Medication 300 MILLIGRAM(S): at 00:00

## 2022-01-01 RX ADMIN — ATORVASTATIN CALCIUM 20 MILLIGRAM(S): 80 TABLET, FILM COATED ORAL at 22:27

## 2022-01-01 RX ADMIN — Medication 300 MILLIGRAM(S): at 17:13

## 2022-01-01 RX ADMIN — PREGABALIN 1000 MICROGRAM(S): 225 CAPSULE ORAL at 11:17

## 2022-01-01 RX ADMIN — Medication 300 MILLIGRAM(S): at 18:33

## 2022-01-01 RX ADMIN — Medication 300 MILLIGRAM(S): at 00:40

## 2022-01-01 RX ADMIN — TRAMADOL HYDROCHLORIDE 50 MILLIGRAM(S): 50 TABLET ORAL at 22:47

## 2022-01-01 RX ADMIN — LEVETIRACETAM 400 MILLIGRAM(S): 250 TABLET, FILM COATED ORAL at 17:13

## 2022-01-01 RX ADMIN — LEVETIRACETAM 750 MILLIGRAM(S): 250 TABLET, FILM COATED ORAL at 05:35

## 2022-01-01 RX ADMIN — Medication 3 MILLILITER(S): at 17:15

## 2022-01-01 RX ADMIN — Medication 100 MILLIGRAM(S): at 10:12

## 2022-01-01 RX ADMIN — Medication 30 MILLIGRAM(S): at 23:36

## 2022-01-01 RX ADMIN — LEVETIRACETAM 750 MILLIGRAM(S): 250 TABLET, FILM COATED ORAL at 18:09

## 2022-01-01 RX ADMIN — TRAMADOL HYDROCHLORIDE 50 MILLIGRAM(S): 50 TABLET ORAL at 22:30

## 2022-01-01 RX ADMIN — Medication 3 MILLILITER(S): at 23:44

## 2022-01-01 RX ADMIN — SODIUM CHLORIDE 80 MILLILITER(S): 9 INJECTION, SOLUTION INTRAVENOUS at 07:32

## 2022-01-01 RX ADMIN — Medication 650 MILLIGRAM(S): at 18:31

## 2022-01-01 RX ADMIN — ATORVASTATIN CALCIUM 20 MILLIGRAM(S): 80 TABLET, FILM COATED ORAL at 21:09

## 2022-01-01 RX ADMIN — Medication 3 MILLILITER(S): at 00:09

## 2022-01-01 RX ADMIN — Medication 3 MILLILITER(S): at 17:49

## 2022-01-01 RX ADMIN — CHLORHEXIDINE GLUCONATE 1 APPLICATION(S): 213 SOLUTION TOPICAL at 08:11

## 2022-01-01 RX ADMIN — Medication 30 MILLIGRAM(S): at 21:06

## 2022-01-01 RX ADMIN — Medication 0.25 MILLIGRAM(S): at 11:58

## 2022-01-01 RX ADMIN — LEVETIRACETAM 750 MILLIGRAM(S): 250 TABLET, FILM COATED ORAL at 19:57

## 2022-01-01 RX ADMIN — SENNA PLUS 2 TABLET(S): 8.6 TABLET ORAL at 22:59

## 2022-01-01 RX ADMIN — HEPARIN SODIUM 5000 UNIT(S): 5000 INJECTION INTRAVENOUS; SUBCUTANEOUS at 05:24

## 2022-01-01 RX ADMIN — Medication 3 MILLILITER(S): at 05:17

## 2022-01-01 RX ADMIN — HEPARIN SODIUM 5000 UNIT(S): 5000 INJECTION INTRAVENOUS; SUBCUTANEOUS at 13:38

## 2022-01-01 RX ADMIN — Medication 300 MILLIGRAM(S): at 05:29

## 2022-01-01 RX ADMIN — Medication 3 MILLILITER(S): at 14:32

## 2022-01-01 RX ADMIN — Medication 20 MILLIGRAM(S): at 15:41

## 2022-01-01 RX ADMIN — Medication 3 MILLILITER(S): at 17:22

## 2022-01-01 RX ADMIN — Medication 75 MICROGRAM(S): at 05:14

## 2022-01-01 RX ADMIN — Medication 1 DROP(S): at 05:36

## 2022-01-01 RX ADMIN — Medication 100 MILLIGRAM(S): at 11:06

## 2022-01-01 RX ADMIN — Medication 300 MILLIGRAM(S): at 12:36

## 2022-01-01 RX ADMIN — ATORVASTATIN CALCIUM 20 MILLIGRAM(S): 80 TABLET, FILM COATED ORAL at 22:20

## 2022-01-01 RX ADMIN — Medication 100 MILLIGRAM(S): at 12:06

## 2022-01-01 RX ADMIN — Medication 3 MILLILITER(S): at 18:14

## 2022-01-01 RX ADMIN — Medication 300 MILLIGRAM(S): at 02:45

## 2022-01-01 RX ADMIN — Medication 1 DROP(S): at 18:54

## 2022-01-01 RX ADMIN — Medication 30 MILLIGRAM(S): at 06:38

## 2022-01-01 RX ADMIN — Medication 100 MILLIGRAM(S): at 09:19

## 2022-01-01 RX ADMIN — Medication 650 MILLIGRAM(S): at 07:00

## 2022-01-01 RX ADMIN — Medication 25 MILLIGRAM(S): at 09:11

## 2022-01-01 RX ADMIN — HEPARIN SODIUM 5000 UNIT(S): 5000 INJECTION INTRAVENOUS; SUBCUTANEOUS at 23:02

## 2022-01-01 RX ADMIN — SODIUM CHLORIDE 4 MILLILITER(S): 9 INJECTION INTRAMUSCULAR; INTRAVENOUS; SUBCUTANEOUS at 13:15

## 2022-01-01 RX ADMIN — Medication 100 MILLIGRAM(S): at 12:00

## 2022-01-01 RX ADMIN — Medication 650 MILLIGRAM(S): at 05:41

## 2022-01-01 RX ADMIN — HEPARIN SODIUM 5000 UNIT(S): 5000 INJECTION INTRAVENOUS; SUBCUTANEOUS at 15:31

## 2022-01-01 RX ADMIN — Medication 650 MILLIGRAM(S): at 06:24

## 2022-01-01 RX ADMIN — Medication 30 MILLIGRAM(S): at 17:56

## 2022-01-01 RX ADMIN — Medication 3 MILLILITER(S): at 10:45

## 2022-01-01 RX ADMIN — Medication 1 DROP(S): at 21:14

## 2022-01-01 RX ADMIN — INSULIN HUMAN 6 UNIT(S)/HR: 100 INJECTION, SOLUTION SUBCUTANEOUS at 21:46

## 2022-01-01 RX ADMIN — Medication 650 MILLIGRAM(S): at 06:11

## 2022-01-01 RX ADMIN — ALBUTEROL 2 PUFF(S): 90 AEROSOL, METERED ORAL at 12:18

## 2022-01-01 RX ADMIN — CHLORHEXIDINE GLUCONATE 1 APPLICATION(S): 213 SOLUTION TOPICAL at 05:22

## 2022-01-01 RX ADMIN — Medication 650 MILLIGRAM(S): at 09:58

## 2022-01-01 RX ADMIN — LEVETIRACETAM 750 MILLIGRAM(S): 250 TABLET, FILM COATED ORAL at 21:43

## 2022-01-01 RX ADMIN — ENOXAPARIN SODIUM 40 MILLIGRAM(S): 100 INJECTION SUBCUTANEOUS at 18:39

## 2022-01-01 RX ADMIN — CHLORHEXIDINE GLUCONATE 1 APPLICATION(S): 213 SOLUTION TOPICAL at 05:52

## 2022-01-01 RX ADMIN — Medication 100 MILLIGRAM(S): at 13:32

## 2022-01-01 RX ADMIN — Medication 25 MILLIGRAM(S): at 17:33

## 2022-01-01 RX ADMIN — Medication 650 MILLIGRAM(S): at 13:20

## 2022-01-01 RX ADMIN — Medication 3 MILLIGRAM(S): at 22:27

## 2022-01-01 RX ADMIN — PREGABALIN 1000 MICROGRAM(S): 225 CAPSULE ORAL at 13:18

## 2022-01-01 RX ADMIN — CHLORHEXIDINE GLUCONATE 1 APPLICATION(S): 213 SOLUTION TOPICAL at 06:27

## 2022-01-01 RX ADMIN — Medication 25 MILLIGRAM(S): at 17:24

## 2022-01-01 RX ADMIN — Medication 3 MILLILITER(S): at 00:58

## 2022-01-01 RX ADMIN — Medication 100 MILLIGRAM(S): at 09:21

## 2022-01-01 RX ADMIN — Medication 1: at 12:17

## 2022-01-01 RX ADMIN — SODIUM CHLORIDE 4 MILLILITER(S): 9 INJECTION INTRAMUSCULAR; INTRAVENOUS; SUBCUTANEOUS at 18:27

## 2022-01-01 RX ADMIN — Medication 75 MICROGRAM(S): at 06:33

## 2022-01-01 RX ADMIN — Medication 3 MILLILITER(S): at 00:00

## 2022-01-01 RX ADMIN — Medication 250 MILLIGRAM(S): at 15:06

## 2022-01-01 RX ADMIN — Medication 1: at 06:47

## 2022-01-01 RX ADMIN — HUMAN INSULIN 7 UNIT(S): 100 INJECTION, SUSPENSION SUBCUTANEOUS at 00:27

## 2022-01-01 RX ADMIN — HYDROMORPHONE HYDROCHLORIDE 0.25 MILLIGRAM(S): 2 INJECTION INTRAMUSCULAR; INTRAVENOUS; SUBCUTANEOUS at 12:04

## 2022-01-01 RX ADMIN — Medication 3 MILLILITER(S): at 12:40

## 2022-01-01 RX ADMIN — HEPARIN SODIUM 5000 UNIT(S): 5000 INJECTION INTRAVENOUS; SUBCUTANEOUS at 14:12

## 2022-01-01 RX ADMIN — Medication 3 MILLILITER(S): at 22:21

## 2022-01-01 RX ADMIN — Medication 300 MILLIGRAM(S): at 05:26

## 2022-01-01 RX ADMIN — Medication 25 MILLIGRAM(S): at 11:42

## 2022-01-01 RX ADMIN — LEVETIRACETAM 750 MILLIGRAM(S): 250 TABLET, FILM COATED ORAL at 17:42

## 2022-01-01 RX ADMIN — Medication 4 MILLILITER(S): at 18:08

## 2022-01-01 RX ADMIN — Medication 30 MILLIGRAM(S): at 05:35

## 2022-01-01 RX ADMIN — LEVETIRACETAM 750 MILLIGRAM(S): 250 TABLET, FILM COATED ORAL at 21:14

## 2022-01-01 RX ADMIN — Medication 4 MILLILITER(S): at 23:01

## 2022-01-01 RX ADMIN — Medication 0.5 MILLIGRAM(S): at 06:44

## 2022-01-01 RX ADMIN — REMDESIVIR 500 MILLIGRAM(S): 5 INJECTION INTRAVENOUS at 11:13

## 2022-01-01 RX ADMIN — SODIUM CHLORIDE 4 MILLILITER(S): 9 INJECTION INTRAMUSCULAR; INTRAVENOUS; SUBCUTANEOUS at 05:15

## 2022-01-01 RX ADMIN — Medication 45 MILLIGRAM(S): at 05:38

## 2022-01-01 RX ADMIN — CHLORHEXIDINE GLUCONATE 1 APPLICATION(S): 213 SOLUTION TOPICAL at 05:09

## 2022-01-01 RX ADMIN — Medication 300 MILLIGRAM(S): at 23:38

## 2022-01-01 RX ADMIN — Medication 3 MILLILITER(S): at 06:47

## 2022-01-01 RX ADMIN — NYSTATIN CREAM 1 APPLICATION(S): 100000 CREAM TOPICAL at 21:22

## 2022-01-01 RX ADMIN — Medication 3 MILLILITER(S): at 12:00

## 2022-01-01 RX ADMIN — ATORVASTATIN CALCIUM 20 MILLIGRAM(S): 80 TABLET, FILM COATED ORAL at 21:17

## 2022-01-01 RX ADMIN — Medication 300 MILLIGRAM(S): at 06:23

## 2022-01-01 RX ADMIN — Medication 1: at 05:40

## 2022-01-01 RX ADMIN — Medication 1 DROP(S): at 05:15

## 2022-01-01 RX ADMIN — Medication 1 DROP(S): at 13:47

## 2022-01-01 RX ADMIN — Medication 100 MILLIGRAM(S): at 09:49

## 2022-01-01 RX ADMIN — Medication 5 MILLIGRAM(S): at 21:24

## 2022-01-01 RX ADMIN — Medication 300 MILLIGRAM(S): at 05:49

## 2022-01-01 RX ADMIN — Medication 300 MILLIGRAM(S): at 13:19

## 2022-01-01 RX ADMIN — SODIUM CHLORIDE 4 MILLILITER(S): 9 INJECTION INTRAMUSCULAR; INTRAVENOUS; SUBCUTANEOUS at 06:15

## 2022-01-01 RX ADMIN — Medication 45 MILLIGRAM(S): at 06:06

## 2022-01-01 RX ADMIN — Medication 4: at 18:17

## 2022-01-01 RX ADMIN — ATORVASTATIN CALCIUM 20 MILLIGRAM(S): 80 TABLET, FILM COATED ORAL at 21:21

## 2022-01-01 RX ADMIN — ERTAPENEM SODIUM 120 MILLIGRAM(S): 1 INJECTION, POWDER, LYOPHILIZED, FOR SOLUTION INTRAMUSCULAR; INTRAVENOUS at 14:23

## 2022-01-01 RX ADMIN — Medication 30 MILLIGRAM(S): at 23:24

## 2022-01-01 RX ADMIN — Medication 1 DROP(S): at 17:17

## 2022-01-01 RX ADMIN — RIVAROXABAN 10 MILLIGRAM(S): KIT at 11:28

## 2022-01-01 RX ADMIN — Medication 3 MILLILITER(S): at 23:51

## 2022-01-01 RX ADMIN — LEVETIRACETAM 750 MILLIGRAM(S): 250 TABLET, FILM COATED ORAL at 05:55

## 2022-01-01 RX ADMIN — Medication 250 MICROGRAM(S): at 12:31

## 2022-01-01 RX ADMIN — Medication 1 DROP(S): at 14:13

## 2022-01-01 RX ADMIN — Medication 4 MILLILITER(S): at 21:28

## 2022-01-01 RX ADMIN — Medication 1 DROP(S): at 05:32

## 2022-01-01 RX ADMIN — Medication 20 MILLIGRAM(S): at 23:26

## 2022-01-01 RX ADMIN — Medication 3 MILLILITER(S): at 10:21

## 2022-01-01 RX ADMIN — NYSTATIN CREAM 1 APPLICATION(S): 100000 CREAM TOPICAL at 18:14

## 2022-01-01 RX ADMIN — LEVETIRACETAM 750 MILLIGRAM(S): 250 TABLET, FILM COATED ORAL at 09:52

## 2022-01-01 RX ADMIN — Medication 0.5 MILLIGRAM(S): at 06:19

## 2022-01-01 RX ADMIN — HEPARIN SODIUM 5000 UNIT(S): 5000 INJECTION INTRAVENOUS; SUBCUTANEOUS at 13:42

## 2022-01-01 RX ADMIN — Medication 30 MILLIGRAM(S): at 05:07

## 2022-01-01 RX ADMIN — LEVETIRACETAM 750 MILLIGRAM(S): 250 TABLET, FILM COATED ORAL at 05:53

## 2022-01-01 RX ADMIN — HEPARIN SODIUM 5000 UNIT(S): 5000 INJECTION INTRAVENOUS; SUBCUTANEOUS at 21:54

## 2022-01-01 RX ADMIN — SODIUM CHLORIDE 4 MILLILITER(S): 9 INJECTION INTRAMUSCULAR; INTRAVENOUS; SUBCUTANEOUS at 05:43

## 2022-01-01 RX ADMIN — LEVETIRACETAM 750 MILLIGRAM(S): 250 TABLET, FILM COATED ORAL at 09:19

## 2022-01-01 RX ADMIN — Medication 650 MILLIGRAM(S): at 14:14

## 2022-01-01 RX ADMIN — ENOXAPARIN SODIUM 40 MILLIGRAM(S): 100 INJECTION SUBCUTANEOUS at 17:20

## 2022-01-01 RX ADMIN — Medication 650 MILLIGRAM(S): at 14:42

## 2022-01-01 RX ADMIN — HUMAN INSULIN 6 UNIT(S): 100 INJECTION, SUSPENSION SUBCUTANEOUS at 14:15

## 2022-01-01 RX ADMIN — Medication 3 MILLILITER(S): at 11:44

## 2022-01-01 RX ADMIN — Medication 300 MILLIGRAM(S): at 07:03

## 2022-01-01 RX ADMIN — Medication 5 MILLIGRAM(S): at 11:39

## 2022-01-01 RX ADMIN — ENOXAPARIN SODIUM 40 MILLIGRAM(S): 100 INJECTION SUBCUTANEOUS at 12:16

## 2022-01-01 RX ADMIN — Medication 300 MILLIGRAM(S): at 06:19

## 2022-01-01 RX ADMIN — Medication 50 MILLIGRAM(S): at 23:10

## 2022-01-01 RX ADMIN — Medication 30 MILLIGRAM(S): at 12:31

## 2022-01-01 RX ADMIN — Medication 300 MILLIGRAM(S): at 11:55

## 2022-01-01 RX ADMIN — Medication 1 DROP(S): at 13:34

## 2022-01-01 RX ADMIN — Medication 3 MILLILITER(S): at 12:17

## 2022-01-01 RX ADMIN — Medication 1 PACKET(S): at 11:16

## 2022-01-01 RX ADMIN — Medication 300 MILLIGRAM(S): at 05:53

## 2022-01-01 RX ADMIN — Medication 4: at 12:35

## 2022-01-01 RX ADMIN — LEVETIRACETAM 750 MILLIGRAM(S): 250 TABLET, FILM COATED ORAL at 09:36

## 2022-01-01 RX ADMIN — Medication 30 MILLIGRAM(S): at 22:08

## 2022-01-01 RX ADMIN — LEVETIRACETAM 750 MILLIGRAM(S): 250 TABLET, FILM COATED ORAL at 06:33

## 2022-01-01 RX ADMIN — LEVETIRACETAM 750 MILLIGRAM(S): 250 TABLET, FILM COATED ORAL at 06:24

## 2022-01-01 RX ADMIN — VASOPRESSIN 15 UNIT(S)/MIN: 20 INJECTION INTRAVENOUS at 21:46

## 2022-01-01 RX ADMIN — Medication 4 MILLILITER(S): at 23:24

## 2022-01-01 RX ADMIN — Medication 30 MILLIGRAM(S): at 11:33

## 2022-01-01 RX ADMIN — Medication 650 MILLIGRAM(S): at 13:12

## 2022-01-01 RX ADMIN — ATORVASTATIN CALCIUM 20 MILLIGRAM(S): 80 TABLET, FILM COATED ORAL at 21:20

## 2022-01-01 RX ADMIN — HEPARIN SODIUM 5000 UNIT(S): 5000 INJECTION INTRAVENOUS; SUBCUTANEOUS at 13:31

## 2022-01-01 RX ADMIN — Medication 50 MILLIGRAM(S): at 06:53

## 2022-01-01 RX ADMIN — Medication 75 MICROGRAM(S): at 06:18

## 2022-01-01 RX ADMIN — Medication 4 MILLILITER(S): at 09:27

## 2022-01-01 RX ADMIN — Medication 650 MILLIGRAM(S): at 22:33

## 2022-01-01 RX ADMIN — LEVETIRACETAM 500 MILLIGRAM(S): 250 TABLET, FILM COATED ORAL at 18:02

## 2022-01-01 RX ADMIN — LEVETIRACETAM 750 MILLIGRAM(S): 250 TABLET, FILM COATED ORAL at 18:36

## 2022-01-01 RX ADMIN — Medication 4 MILLILITER(S): at 23:09

## 2022-01-01 RX ADMIN — Medication 3 MILLILITER(S): at 06:15

## 2022-01-01 RX ADMIN — Medication 3 MILLILITER(S): at 06:44

## 2022-01-01 RX ADMIN — Medication 4 MILLILITER(S): at 13:30

## 2022-01-01 RX ADMIN — Medication 0.5 MILLIGRAM(S): at 18:09

## 2022-01-01 RX ADMIN — LEVETIRACETAM 750 MILLIGRAM(S): 250 TABLET, FILM COATED ORAL at 06:47

## 2022-01-01 RX ADMIN — Medication 650 MILLIGRAM(S): at 15:00

## 2022-01-01 RX ADMIN — Medication 400 MILLIGRAM(S): at 12:00

## 2022-01-01 RX ADMIN — Medication 20 MILLIGRAM(S): at 00:31

## 2022-01-01 RX ADMIN — Medication 0.5 MILLIGRAM(S): at 05:30

## 2022-01-01 RX ADMIN — Medication 650 MILLIGRAM(S): at 11:49

## 2022-01-01 RX ADMIN — Medication 4 MILLILITER(S): at 05:19

## 2022-01-01 RX ADMIN — HEPARIN SODIUM 5000 UNIT(S): 5000 INJECTION INTRAVENOUS; SUBCUTANEOUS at 21:17

## 2022-01-01 RX ADMIN — NYSTATIN CREAM 1 APPLICATION(S): 100000 CREAM TOPICAL at 05:19

## 2022-01-01 RX ADMIN — Medication 3 MILLILITER(S): at 17:55

## 2022-01-01 RX ADMIN — Medication 75 MICROGRAM(S): at 07:09

## 2022-01-01 RX ADMIN — Medication 300 MILLIGRAM(S): at 06:31

## 2022-01-01 RX ADMIN — Medication 50 MILLIGRAM(S): at 06:22

## 2022-01-01 RX ADMIN — HUMAN INSULIN 7 UNIT(S): 100 INJECTION, SUSPENSION SUBCUTANEOUS at 07:11

## 2022-01-01 RX ADMIN — HEPARIN SODIUM 5000 UNIT(S): 5000 INJECTION INTRAVENOUS; SUBCUTANEOUS at 21:14

## 2022-01-01 RX ADMIN — Medication 1 DROP(S): at 13:10

## 2022-01-01 RX ADMIN — Medication 3 MILLILITER(S): at 01:02

## 2022-01-01 RX ADMIN — SODIUM CHLORIDE 4 MILLILITER(S): 9 INJECTION INTRAMUSCULAR; INTRAVENOUS; SUBCUTANEOUS at 19:35

## 2022-01-01 RX ADMIN — Medication 250 MILLIGRAM(S): at 20:29

## 2022-01-01 RX ADMIN — Medication 300 MILLIGRAM(S): at 07:09

## 2022-01-01 RX ADMIN — Medication 3 MILLIGRAM(S): at 02:43

## 2022-01-01 RX ADMIN — LEVETIRACETAM 750 MILLIGRAM(S): 250 TABLET, FILM COATED ORAL at 10:54

## 2022-01-01 RX ADMIN — LEVETIRACETAM 750 MILLIGRAM(S): 250 TABLET, FILM COATED ORAL at 09:20

## 2022-01-01 RX ADMIN — Medication 4 MILLILITER(S): at 05:11

## 2022-01-01 RX ADMIN — Medication 1: at 05:48

## 2022-01-01 RX ADMIN — Medication 5 MILLIGRAM(S): at 21:48

## 2022-01-01 RX ADMIN — Medication 5 MILLIGRAM(S): at 03:34

## 2022-01-01 RX ADMIN — LEVETIRACETAM 750 MILLIGRAM(S): 250 TABLET, FILM COATED ORAL at 10:56

## 2022-01-01 RX ADMIN — Medication 300 MILLIGRAM(S): at 19:35

## 2022-01-01 RX ADMIN — Medication 1: at 12:25

## 2022-01-01 RX ADMIN — Medication 3 MILLILITER(S): at 09:18

## 2022-01-01 RX ADMIN — Medication 650 MILLIGRAM(S): at 22:32

## 2022-01-01 RX ADMIN — HUMAN INSULIN 7 UNIT(S): 100 INJECTION, SUSPENSION SUBCUTANEOUS at 06:05

## 2022-01-01 RX ADMIN — NYSTATIN CREAM 1 APPLICATION(S): 100000 CREAM TOPICAL at 06:36

## 2022-01-01 RX ADMIN — Medication 3 MILLIGRAM(S): at 21:10

## 2022-01-01 RX ADMIN — SODIUM CHLORIDE 50 MILLILITER(S): 9 INJECTION, SOLUTION INTRAVENOUS at 05:31

## 2022-01-01 RX ADMIN — Medication 1 PACKET(S): at 11:07

## 2022-01-01 RX ADMIN — Medication 2: at 18:12

## 2022-01-01 RX ADMIN — Medication 3 MILLILITER(S): at 06:16

## 2022-01-01 RX ADMIN — Medication 3 MILLILITER(S): at 06:21

## 2022-01-01 RX ADMIN — Medication 40 MILLIEQUIVALENT(S): at 18:06

## 2022-01-01 RX ADMIN — ANASTROZOLE 1 MILLIGRAM(S): 1 TABLET ORAL at 13:28

## 2022-01-01 RX ADMIN — Medication 3 MILLILITER(S): at 05:14

## 2022-01-01 RX ADMIN — HUMAN INSULIN 10 UNIT(S): 100 INJECTION, SUSPENSION SUBCUTANEOUS at 18:18

## 2022-01-01 RX ADMIN — NYSTATIN CREAM 1 APPLICATION(S): 100000 CREAM TOPICAL at 18:29

## 2022-01-01 RX ADMIN — SENNA PLUS 2 TABLET(S): 8.6 TABLET ORAL at 21:46

## 2022-01-01 RX ADMIN — ERTAPENEM SODIUM 120 MILLIGRAM(S): 1 INJECTION, POWDER, LYOPHILIZED, FOR SOLUTION INTRAMUSCULAR; INTRAVENOUS at 16:40

## 2022-01-01 RX ADMIN — ANASTROZOLE 1 MILLIGRAM(S): 1 TABLET ORAL at 13:07

## 2022-01-01 RX ADMIN — LEVETIRACETAM 750 MILLIGRAM(S): 250 TABLET, FILM COATED ORAL at 18:15

## 2022-01-01 RX ADMIN — Medication 20 MILLIGRAM(S): at 18:43

## 2022-01-01 RX ADMIN — SODIUM CHLORIDE 4 MILLILITER(S): 9 INJECTION INTRAMUSCULAR; INTRAVENOUS; SUBCUTANEOUS at 06:03

## 2022-01-01 RX ADMIN — Medication 3 MILLIGRAM(S): at 22:40

## 2022-01-01 RX ADMIN — SODIUM CHLORIDE 4 MILLILITER(S): 9 INJECTION INTRAMUSCULAR; INTRAVENOUS; SUBCUTANEOUS at 06:21

## 2022-01-01 RX ADMIN — SODIUM CHLORIDE 4 MILLILITER(S): 9 INJECTION INTRAMUSCULAR; INTRAVENOUS; SUBCUTANEOUS at 17:39

## 2022-01-01 RX ADMIN — Medication 30 MILLIGRAM(S): at 21:30

## 2022-01-01 RX ADMIN — ANASTROZOLE 1 MILLIGRAM(S): 1 TABLET ORAL at 11:35

## 2022-01-01 RX ADMIN — SODIUM CHLORIDE 4 MILLILITER(S): 9 INJECTION INTRAMUSCULAR; INTRAVENOUS; SUBCUTANEOUS at 17:23

## 2022-01-01 RX ADMIN — TRAMADOL HYDROCHLORIDE 50 MILLIGRAM(S): 50 TABLET ORAL at 10:20

## 2022-01-01 RX ADMIN — CHLORHEXIDINE GLUCONATE 1 APPLICATION(S): 213 SOLUTION TOPICAL at 08:13

## 2022-01-01 RX ADMIN — Medication 300 MILLIGRAM(S): at 12:10

## 2022-01-01 RX ADMIN — Medication 300 MILLIGRAM(S): at 23:36

## 2022-01-01 RX ADMIN — Medication 100 MILLIGRAM(S): at 11:33

## 2022-01-01 RX ADMIN — SODIUM CHLORIDE 4 MILLILITER(S): 9 INJECTION INTRAMUSCULAR; INTRAVENOUS; SUBCUTANEOUS at 17:34

## 2022-01-01 RX ADMIN — SODIUM CHLORIDE 1000 MILLILITER(S): 9 INJECTION, SOLUTION INTRAVENOUS at 00:53

## 2022-01-01 RX ADMIN — NYSTATIN CREAM 1 APPLICATION(S): 100000 CREAM TOPICAL at 05:42

## 2022-01-01 RX ADMIN — LEVETIRACETAM 750 MILLIGRAM(S): 250 TABLET, FILM COATED ORAL at 08:36

## 2022-01-01 RX ADMIN — Medication 3 MILLILITER(S): at 22:50

## 2022-01-01 RX ADMIN — AMLODIPINE BESYLATE 10 MILLIGRAM(S): 2.5 TABLET ORAL at 20:26

## 2022-01-01 RX ADMIN — Medication 1 DROP(S): at 06:02

## 2022-01-01 RX ADMIN — Medication 3 MILLILITER(S): at 06:14

## 2022-01-01 RX ADMIN — Medication 100 MILLIGRAM(S): at 13:46

## 2022-01-01 RX ADMIN — LEVETIRACETAM 750 MILLIGRAM(S): 250 TABLET, FILM COATED ORAL at 06:35

## 2022-01-01 RX ADMIN — SODIUM CHLORIDE 4 MILLILITER(S): 9 INJECTION INTRAMUSCULAR; INTRAVENOUS; SUBCUTANEOUS at 00:41

## 2022-01-01 RX ADMIN — Medication 3 MILLILITER(S): at 11:37

## 2022-01-01 RX ADMIN — Medication 3 MILLILITER(S): at 05:37

## 2022-01-01 RX ADMIN — Medication 650 MILLIGRAM(S): at 11:53

## 2022-01-01 RX ADMIN — Medication 300 MILLIGRAM(S): at 05:34

## 2022-01-01 RX ADMIN — Medication 650 MILLIGRAM(S): at 10:00

## 2022-01-01 RX ADMIN — Medication 300 MILLIGRAM(S): at 17:18

## 2022-01-01 RX ADMIN — Medication 0.25 MILLIGRAM(S): at 12:59

## 2022-01-01 RX ADMIN — Medication 30 MILLIGRAM(S): at 06:02

## 2022-01-01 RX ADMIN — Medication 300 MILLIGRAM(S): at 23:25

## 2022-01-01 RX ADMIN — Medication 3 MILLILITER(S): at 22:35

## 2022-01-01 RX ADMIN — Medication 30 MILLIGRAM(S): at 07:08

## 2022-01-01 RX ADMIN — Medication 2: at 12:20

## 2022-01-01 RX ADMIN — SODIUM CHLORIDE 4 MILLILITER(S): 9 INJECTION INTRAMUSCULAR; INTRAVENOUS; SUBCUTANEOUS at 06:48

## 2022-01-01 RX ADMIN — SODIUM CHLORIDE 4 MILLILITER(S): 9 INJECTION INTRAMUSCULAR; INTRAVENOUS; SUBCUTANEOUS at 05:24

## 2022-01-01 RX ADMIN — ANASTROZOLE 1 MILLIGRAM(S): 1 TABLET ORAL at 13:05

## 2022-01-01 RX ADMIN — ATORVASTATIN CALCIUM 20 MILLIGRAM(S): 80 TABLET, FILM COATED ORAL at 21:19

## 2022-01-01 RX ADMIN — Medication 3 MILLILITER(S): at 06:04

## 2022-01-01 RX ADMIN — Medication 3 MILLILITER(S): at 17:50

## 2022-01-01 RX ADMIN — Medication 4: at 11:32

## 2022-01-01 RX ADMIN — Medication 3 MILLILITER(S): at 01:32

## 2022-01-01 RX ADMIN — Medication 75 MICROGRAM(S): at 05:54

## 2022-01-01 RX ADMIN — TRAMADOL HYDROCHLORIDE 50 MILLIGRAM(S): 50 TABLET ORAL at 14:28

## 2022-01-01 RX ADMIN — Medication 4 MILLILITER(S): at 23:02

## 2022-01-01 RX ADMIN — TRAMADOL HYDROCHLORIDE 50 MILLIGRAM(S): 50 TABLET ORAL at 23:19

## 2022-01-01 RX ADMIN — Medication 1 DROP(S): at 21:21

## 2022-01-01 RX ADMIN — CHLORHEXIDINE GLUCONATE 1 APPLICATION(S): 213 SOLUTION TOPICAL at 07:11

## 2022-01-01 RX ADMIN — RIVAROXABAN 20 MILLIGRAM(S): KIT at 19:12

## 2022-01-01 RX ADMIN — LEVETIRACETAM 750 MILLIGRAM(S): 250 TABLET, FILM COATED ORAL at 17:32

## 2022-01-01 RX ADMIN — Medication 1 TABLET(S): at 09:21

## 2022-01-01 RX ADMIN — Medication 1: at 08:36

## 2022-01-01 RX ADMIN — Medication 3 MILLILITER(S): at 18:03

## 2022-01-01 RX ADMIN — SODIUM CHLORIDE 4 MILLILITER(S): 9 INJECTION INTRAMUSCULAR; INTRAVENOUS; SUBCUTANEOUS at 05:37

## 2022-01-01 RX ADMIN — Medication 1 DROP(S): at 13:15

## 2022-01-01 RX ADMIN — Medication 1: at 00:15

## 2022-01-01 RX ADMIN — ANASTROZOLE 1 MILLIGRAM(S): 1 TABLET ORAL at 14:14

## 2022-01-01 RX ADMIN — LEVETIRACETAM 500 MILLIGRAM(S): 250 TABLET, FILM COATED ORAL at 18:07

## 2022-01-01 RX ADMIN — HUMAN INSULIN 6 UNIT(S): 100 INJECTION, SUSPENSION SUBCUTANEOUS at 12:41

## 2022-01-01 RX ADMIN — Medication 3 MILLILITER(S): at 21:20

## 2022-01-01 RX ADMIN — Medication 87.4 MICROGRAM(S)/KG/MIN: at 21:06

## 2022-01-01 RX ADMIN — LEVETIRACETAM 750 MILLIGRAM(S): 250 TABLET, FILM COATED ORAL at 23:33

## 2022-01-01 RX ADMIN — ATORVASTATIN CALCIUM 20 MILLIGRAM(S): 80 TABLET, FILM COATED ORAL at 22:09

## 2022-01-01 RX ADMIN — Medication 3 MILLILITER(S): at 23:24

## 2022-01-01 RX ADMIN — Medication 30 MILLIGRAM(S): at 13:21

## 2022-01-01 RX ADMIN — CHLORHEXIDINE GLUCONATE 1 APPLICATION(S): 213 SOLUTION TOPICAL at 07:45

## 2022-01-01 RX ADMIN — TRAMADOL HYDROCHLORIDE 50 MILLIGRAM(S): 50 TABLET ORAL at 13:28

## 2022-01-01 RX ADMIN — Medication 20 MILLIGRAM(S): at 00:09

## 2022-01-01 RX ADMIN — Medication 75 MICROGRAM(S): at 06:22

## 2022-01-01 RX ADMIN — Medication 75 MICROGRAM(S): at 07:04

## 2022-01-01 RX ADMIN — Medication 2 PUFF(S): at 11:46

## 2022-01-01 RX ADMIN — Medication 0.5 MILLIGRAM(S): at 05:50

## 2022-01-01 RX ADMIN — Medication 25 MILLIGRAM(S): at 23:01

## 2022-01-01 RX ADMIN — Medication 3 MILLILITER(S): at 06:00

## 2022-01-01 RX ADMIN — HEPARIN SODIUM 5000 UNIT(S): 5000 INJECTION INTRAVENOUS; SUBCUTANEOUS at 06:25

## 2022-01-01 RX ADMIN — Medication 300 MILLIGRAM(S): at 13:32

## 2022-01-01 RX ADMIN — Medication 30 MILLIGRAM(S): at 16:08

## 2022-01-01 RX ADMIN — PIPERACILLIN AND TAZOBACTAM 200 GRAM(S): 4; .5 INJECTION, POWDER, LYOPHILIZED, FOR SOLUTION INTRAVENOUS at 01:19

## 2022-01-01 RX ADMIN — Medication 4 MILLILITER(S): at 13:36

## 2022-01-01 RX ADMIN — Medication 1: at 17:13

## 2022-01-01 RX ADMIN — Medication 3 MILLILITER(S): at 13:47

## 2022-01-01 RX ADMIN — Medication 1 DROP(S): at 14:49

## 2022-01-01 RX ADMIN — NYSTATIN CREAM 1 APPLICATION(S): 100000 CREAM TOPICAL at 17:36

## 2022-01-01 RX ADMIN — PREGABALIN 1000 MICROGRAM(S): 225 CAPSULE ORAL at 11:06

## 2022-01-01 RX ADMIN — Medication 125 MICROGRAM(S): at 06:38

## 2022-01-01 RX ADMIN — Medication 30 MILLIGRAM(S): at 05:29

## 2022-01-01 RX ADMIN — Medication 1 PACKET(S): at 12:29

## 2022-01-01 RX ADMIN — Medication 20 MILLIGRAM(S): at 17:55

## 2022-01-01 RX ADMIN — Medication 300 MILLIGRAM(S): at 07:05

## 2022-01-01 RX ADMIN — Medication 30 MILLIGRAM(S): at 13:12

## 2022-01-01 RX ADMIN — Medication 75 MICROGRAM(S): at 06:48

## 2022-01-01 RX ADMIN — Medication 1: at 06:52

## 2022-01-01 RX ADMIN — Medication 650 MILLIGRAM(S): at 21:33

## 2022-01-01 RX ADMIN — Medication 300 MILLIGRAM(S): at 11:44

## 2022-01-01 RX ADMIN — Medication 1: at 18:05

## 2022-01-01 RX ADMIN — Medication 650 MILLIGRAM(S): at 19:00

## 2022-01-01 RX ADMIN — Medication 20 MILLIGRAM(S): at 11:52

## 2022-01-01 RX ADMIN — NYSTATIN CREAM 1 APPLICATION(S): 100000 CREAM TOPICAL at 17:22

## 2022-01-01 RX ADMIN — Medication 650 MILLIGRAM(S): at 21:40

## 2022-01-01 RX ADMIN — Medication 1000 MILLIGRAM(S): at 20:17

## 2022-01-01 RX ADMIN — SODIUM CHLORIDE 4 MILLILITER(S): 9 INJECTION INTRAMUSCULAR; INTRAVENOUS; SUBCUTANEOUS at 00:27

## 2022-01-01 RX ADMIN — Medication 300 MILLIGRAM(S): at 18:26

## 2022-01-01 RX ADMIN — Medication 300 MILLIGRAM(S): at 17:15

## 2022-01-01 RX ADMIN — Medication 100 MILLIGRAM(S): at 11:45

## 2022-01-01 RX ADMIN — RIVAROXABAN 20 MILLIGRAM(S): KIT at 18:37

## 2022-01-01 RX ADMIN — Medication 4 MILLILITER(S): at 12:12

## 2022-01-01 RX ADMIN — Medication 4 MILLILITER(S): at 18:13

## 2022-01-01 RX ADMIN — Medication 0.5 MILLIGRAM(S): at 07:13

## 2022-01-01 RX ADMIN — Medication 3 MILLILITER(S): at 22:10

## 2022-01-01 RX ADMIN — Medication 0: at 06:47

## 2022-01-01 RX ADMIN — Medication 30 MILLIGRAM(S): at 00:38

## 2022-01-01 RX ADMIN — REMDESIVIR 500 MILLIGRAM(S): 5 INJECTION INTRAVENOUS at 14:55

## 2022-01-01 RX ADMIN — Medication 5 MILLIGRAM(S): at 11:04

## 2022-01-01 RX ADMIN — SODIUM CHLORIDE 4 MILLILITER(S): 9 INJECTION INTRAMUSCULAR; INTRAVENOUS; SUBCUTANEOUS at 23:58

## 2022-01-01 RX ADMIN — Medication 300 MILLIGRAM(S): at 12:25

## 2022-01-01 RX ADMIN — Medication 650 MILLIGRAM(S): at 05:29

## 2022-01-01 RX ADMIN — HEPARIN SODIUM 5000 UNIT(S): 5000 INJECTION INTRAVENOUS; SUBCUTANEOUS at 21:20

## 2022-01-01 RX ADMIN — Medication 3 MILLILITER(S): at 14:13

## 2022-01-01 RX ADMIN — Medication 3: at 18:30

## 2022-01-01 RX ADMIN — Medication 650 MILLIGRAM(S): at 13:56

## 2022-01-01 RX ADMIN — Medication 300 MILLIGRAM(S): at 00:50

## 2022-01-01 RX ADMIN — Medication 30 MILLIGRAM(S): at 06:18

## 2022-01-01 RX ADMIN — Medication 300 MILLIGRAM(S): at 01:32

## 2022-01-01 RX ADMIN — SODIUM CHLORIDE 4 MILLILITER(S): 9 INJECTION INTRAMUSCULAR; INTRAVENOUS; SUBCUTANEOUS at 17:48

## 2022-01-01 RX ADMIN — Medication 100 MILLIGRAM(S): at 12:08

## 2022-01-01 RX ADMIN — Medication 5 MILLIGRAM(S): at 21:19

## 2022-01-01 RX ADMIN — HEPARIN SODIUM 5000 UNIT(S): 5000 INJECTION INTRAVENOUS; SUBCUTANEOUS at 05:46

## 2022-01-01 RX ADMIN — Medication 300 MILLIGRAM(S): at 18:50

## 2022-01-01 RX ADMIN — Medication 300 MILLIGRAM(S): at 12:53

## 2022-01-01 RX ADMIN — SODIUM CHLORIDE 4 MILLILITER(S): 9 INJECTION INTRAMUSCULAR; INTRAVENOUS; SUBCUTANEOUS at 18:40

## 2022-01-01 RX ADMIN — Medication 75 MICROGRAM(S): at 05:48

## 2022-01-01 RX ADMIN — Medication 30 MILLIGRAM(S): at 08:11

## 2022-01-01 RX ADMIN — RIVAROXABAN 20 MILLIGRAM(S): KIT at 17:47

## 2022-01-01 RX ADMIN — SODIUM CHLORIDE 4 MILLILITER(S): 9 INJECTION INTRAMUSCULAR; INTRAVENOUS; SUBCUTANEOUS at 06:29

## 2022-01-01 RX ADMIN — Medication 1: at 23:40

## 2022-01-01 RX ADMIN — Medication 20 MILLIGRAM(S): at 17:34

## 2022-01-01 RX ADMIN — Medication 300 MILLIGRAM(S): at 18:30

## 2022-01-01 RX ADMIN — LEVETIRACETAM 750 MILLIGRAM(S): 250 TABLET, FILM COATED ORAL at 10:20

## 2022-01-01 RX ADMIN — Medication 3 MILLILITER(S): at 05:29

## 2022-01-01 RX ADMIN — NYSTATIN CREAM 1 APPLICATION(S): 100000 CREAM TOPICAL at 17:15

## 2022-01-01 RX ADMIN — Medication 650 MILLIGRAM(S): at 11:39

## 2022-01-01 RX ADMIN — Medication 1: at 12:41

## 2022-01-01 RX ADMIN — ANASTROZOLE 1 MILLIGRAM(S): 1 TABLET ORAL at 11:13

## 2022-01-01 RX ADMIN — Medication 30 MILLIGRAM(S): at 13:40

## 2022-01-01 RX ADMIN — CHLORHEXIDINE GLUCONATE 1 APPLICATION(S): 213 SOLUTION TOPICAL at 06:08

## 2022-01-01 RX ADMIN — TRAMADOL HYDROCHLORIDE 50 MILLIGRAM(S): 50 TABLET ORAL at 13:00

## 2022-01-01 RX ADMIN — Medication 3 MILLILITER(S): at 13:21

## 2022-01-01 RX ADMIN — Medication 300 MILLIGRAM(S): at 00:52

## 2022-01-01 RX ADMIN — Medication 0.25 MILLIGRAM(S): at 22:20

## 2022-01-01 RX ADMIN — ERTAPENEM SODIUM 120 MILLIGRAM(S): 1 INJECTION, POWDER, LYOPHILIZED, FOR SOLUTION INTRAMUSCULAR; INTRAVENOUS at 15:48

## 2022-01-01 RX ADMIN — Medication 300 MILLIGRAM(S): at 18:00

## 2022-01-01 RX ADMIN — Medication 30 MILLIGRAM(S): at 17:42

## 2022-01-01 RX ADMIN — SODIUM CHLORIDE 4 MILLILITER(S): 9 INJECTION INTRAMUSCULAR; INTRAVENOUS; SUBCUTANEOUS at 01:02

## 2022-01-01 RX ADMIN — ESCITALOPRAM OXALATE 10 MILLIGRAM(S): 10 TABLET, FILM COATED ORAL at 12:08

## 2022-01-01 RX ADMIN — SODIUM CHLORIDE 1000 MILLILITER(S): 9 INJECTION, SOLUTION INTRAVENOUS at 21:47

## 2022-01-01 RX ADMIN — Medication 30 MILLIGRAM(S): at 18:09

## 2022-01-01 RX ADMIN — Medication 300 MILLIGRAM(S): at 23:44

## 2022-01-01 RX ADMIN — HUMAN INSULIN 7 UNIT(S): 100 INJECTION, SUSPENSION SUBCUTANEOUS at 06:20

## 2022-01-01 RX ADMIN — Medication 1 DROP(S): at 05:35

## 2022-01-01 RX ADMIN — CHLORHEXIDINE GLUCONATE 1 APPLICATION(S): 213 SOLUTION TOPICAL at 06:38

## 2022-01-01 RX ADMIN — Medication 100 MILLIGRAM(S): at 13:30

## 2022-01-01 RX ADMIN — Medication 3 MILLILITER(S): at 23:07

## 2022-01-01 RX ADMIN — ALBUTEROL 2 PUFF(S): 90 AEROSOL, METERED ORAL at 17:04

## 2022-01-01 RX ADMIN — Medication 25 MILLIGRAM(S): at 05:16

## 2022-01-01 RX ADMIN — Medication 4 MILLILITER(S): at 23:51

## 2022-01-01 RX ADMIN — Medication 650 MILLIGRAM(S): at 16:20

## 2022-01-01 RX ADMIN — Medication 3 MILLILITER(S): at 18:19

## 2022-01-01 RX ADMIN — Medication 25 MILLIGRAM(S): at 22:03

## 2022-01-01 RX ADMIN — SODIUM CHLORIDE 4 MILLILITER(S): 9 INJECTION INTRAMUSCULAR; INTRAVENOUS; SUBCUTANEOUS at 23:46

## 2022-01-01 RX ADMIN — Medication 75 MICROGRAM(S): at 06:29

## 2022-01-01 RX ADMIN — Medication 4 MILLILITER(S): at 21:21

## 2022-01-01 RX ADMIN — Medication 4 MILLILITER(S): at 12:01

## 2022-01-01 RX ADMIN — Medication 75 MICROGRAM(S): at 05:01

## 2022-01-01 RX ADMIN — Medication 100 MILLIGRAM(S): at 14:57

## 2022-01-01 RX ADMIN — CHLORHEXIDINE GLUCONATE 1 APPLICATION(S): 213 SOLUTION TOPICAL at 05:48

## 2022-01-01 RX ADMIN — Medication 650 MILLIGRAM(S): at 08:00

## 2022-01-01 RX ADMIN — Medication 75 MICROGRAM(S): at 06:25

## 2022-01-01 RX ADMIN — RIVAROXABAN 20 MILLIGRAM(S): KIT at 17:02

## 2022-01-01 RX ADMIN — Medication 300 MILLIGRAM(S): at 17:51

## 2022-01-01 RX ADMIN — SODIUM CHLORIDE 4 MILLILITER(S): 9 INJECTION INTRAMUSCULAR; INTRAVENOUS; SUBCUTANEOUS at 17:50

## 2022-01-01 RX ADMIN — HUMAN INSULIN 6 UNIT(S): 100 INJECTION, SUSPENSION SUBCUTANEOUS at 18:07

## 2022-01-01 RX ADMIN — Medication 3 MILLILITER(S): at 08:09

## 2022-01-01 RX ADMIN — HEPARIN SODIUM 5000 UNIT(S): 5000 INJECTION INTRAVENOUS; SUBCUTANEOUS at 05:27

## 2022-01-01 RX ADMIN — Medication 650 MILLIGRAM(S): at 01:52

## 2022-01-01 RX ADMIN — SODIUM CHLORIDE 4 MILLILITER(S): 9 INJECTION INTRAMUSCULAR; INTRAVENOUS; SUBCUTANEOUS at 18:06

## 2022-01-01 RX ADMIN — SODIUM CHLORIDE 30 MILLILITER(S): 9 INJECTION INTRAMUSCULAR; INTRAVENOUS; SUBCUTANEOUS at 10:03

## 2022-01-01 RX ADMIN — Medication 2 PUFF(S): at 00:43

## 2022-01-01 RX ADMIN — Medication 650 MILLIGRAM(S): at 08:45

## 2022-01-01 RX ADMIN — PIPERACILLIN AND TAZOBACTAM 25 GRAM(S): 4; .5 INJECTION, POWDER, LYOPHILIZED, FOR SOLUTION INTRAVENOUS at 00:37

## 2022-01-01 RX ADMIN — SODIUM CHLORIDE 4 MILLILITER(S): 9 INJECTION INTRAMUSCULAR; INTRAVENOUS; SUBCUTANEOUS at 23:29

## 2022-01-01 RX ADMIN — Medication 100 MILLIGRAM(S): at 12:52

## 2022-01-01 RX ADMIN — NYSTATIN CREAM 1 APPLICATION(S): 100000 CREAM TOPICAL at 06:11

## 2022-01-01 RX ADMIN — Medication 4 MILLILITER(S): at 21:37

## 2022-01-01 RX ADMIN — SODIUM CHLORIDE 4 MILLILITER(S): 9 INJECTION INTRAMUSCULAR; INTRAVENOUS; SUBCUTANEOUS at 00:14

## 2022-01-01 RX ADMIN — SODIUM CHLORIDE 4 MILLILITER(S): 9 INJECTION INTRAMUSCULAR; INTRAVENOUS; SUBCUTANEOUS at 17:17

## 2022-01-01 RX ADMIN — Medication 1 DROP(S): at 14:37

## 2022-01-01 RX ADMIN — LEVETIRACETAM 750 MILLIGRAM(S): 250 TABLET, FILM COATED ORAL at 22:06

## 2022-01-01 RX ADMIN — SODIUM CHLORIDE 4 MILLILITER(S): 9 INJECTION INTRAMUSCULAR; INTRAVENOUS; SUBCUTANEOUS at 05:38

## 2022-01-01 RX ADMIN — Medication 1: at 00:45

## 2022-01-01 RX ADMIN — PREGABALIN 1000 MICROGRAM(S): 225 CAPSULE ORAL at 12:28

## 2022-01-01 RX ADMIN — Medication 3 MILLILITER(S): at 12:57

## 2022-01-01 RX ADMIN — Medication 0.5 MILLIGRAM(S): at 06:11

## 2022-01-01 RX ADMIN — LEVETIRACETAM 750 MILLIGRAM(S): 250 TABLET, FILM COATED ORAL at 06:10

## 2022-01-01 RX ADMIN — Medication 650 MILLIGRAM(S): at 06:03

## 2022-01-01 RX ADMIN — Medication 3 MILLILITER(S): at 18:26

## 2022-01-01 RX ADMIN — Medication 5 MILLIGRAM(S): at 00:01

## 2022-01-01 RX ADMIN — Medication 3 MILLILITER(S): at 12:56

## 2022-01-01 RX ADMIN — ERTAPENEM SODIUM 120 MILLIGRAM(S): 1 INJECTION, POWDER, LYOPHILIZED, FOR SOLUTION INTRAMUSCULAR; INTRAVENOUS at 13:00

## 2022-01-01 RX ADMIN — Medication 300 MILLIGRAM(S): at 13:15

## 2022-01-01 RX ADMIN — ALBUTEROL 2 PUFF(S): 90 AEROSOL, METERED ORAL at 01:41

## 2022-01-01 RX ADMIN — Medication 4 MILLILITER(S): at 11:45

## 2022-01-01 RX ADMIN — Medication 650 MILLIGRAM(S): at 12:36

## 2022-01-01 RX ADMIN — Medication 4 MILLILITER(S): at 11:12

## 2022-01-01 RX ADMIN — RIVAROXABAN 10 MILLIGRAM(S): KIT at 11:35

## 2022-01-01 RX ADMIN — TRAMADOL HYDROCHLORIDE 50 MILLIGRAM(S): 50 TABLET ORAL at 10:19

## 2022-01-01 RX ADMIN — Medication 3 MILLILITER(S): at 23:27

## 2022-01-01 RX ADMIN — HEPARIN SODIUM 5000 UNIT(S): 5000 INJECTION INTRAVENOUS; SUBCUTANEOUS at 13:11

## 2022-01-01 RX ADMIN — ANASTROZOLE 1 MILLIGRAM(S): 1 TABLET ORAL at 13:10

## 2022-01-01 RX ADMIN — CHLORHEXIDINE GLUCONATE 1 APPLICATION(S): 213 SOLUTION TOPICAL at 06:25

## 2022-01-01 RX ADMIN — Medication 30 MILLIGRAM(S): at 08:06

## 2022-01-01 RX ADMIN — SODIUM CHLORIDE 4 MILLILITER(S): 9 INJECTION INTRAMUSCULAR; INTRAVENOUS; SUBCUTANEOUS at 06:42

## 2022-01-01 RX ADMIN — Medication 3 MILLILITER(S): at 11:16

## 2022-01-01 RX ADMIN — Medication 30 MILLIGRAM(S): at 13:09

## 2022-01-01 RX ADMIN — LEVETIRACETAM 750 MILLIGRAM(S): 250 TABLET, FILM COATED ORAL at 22:02

## 2022-01-01 RX ADMIN — Medication 650 MILLIGRAM(S): at 06:07

## 2022-01-01 RX ADMIN — Medication 4 MILLILITER(S): at 01:02

## 2022-01-01 RX ADMIN — Medication 3 MILLILITER(S): at 11:48

## 2022-01-01 RX ADMIN — Medication 1: at 17:48

## 2022-01-01 RX ADMIN — Medication 1 DROP(S): at 00:16

## 2022-01-01 RX ADMIN — Medication 250 MILLIGRAM(S): at 14:29

## 2022-01-01 RX ADMIN — SODIUM CHLORIDE 4 MILLILITER(S): 9 INJECTION INTRAMUSCULAR; INTRAVENOUS; SUBCUTANEOUS at 11:17

## 2022-01-01 RX ADMIN — Medication 300 MILLIGRAM(S): at 05:14

## 2022-01-01 RX ADMIN — Medication 3 MILLILITER(S): at 17:34

## 2022-01-01 RX ADMIN — Medication 300 MILLIGRAM(S): at 05:17

## 2022-01-01 RX ADMIN — Medication 650 MILLIGRAM(S): at 12:16

## 2022-01-01 RX ADMIN — Medication 20 MILLIGRAM(S): at 23:30

## 2022-01-01 RX ADMIN — Medication 4 MILLILITER(S): at 05:51

## 2022-01-01 RX ADMIN — Medication 4 MILLILITER(S): at 17:20

## 2022-01-01 RX ADMIN — Medication 0.5 MILLIGRAM(S): at 18:13

## 2022-01-01 RX ADMIN — Medication 4 MILLILITER(S): at 21:19

## 2022-01-01 RX ADMIN — Medication 20 MILLIGRAM(S): at 06:20

## 2022-01-01 RX ADMIN — Medication 30 MILLIGRAM(S): at 17:28

## 2022-01-01 RX ADMIN — Medication 3 MILLILITER(S): at 18:11

## 2022-01-01 RX ADMIN — Medication 300 MILLIGRAM(S): at 17:30

## 2022-01-01 RX ADMIN — LEVETIRACETAM 500 MILLIGRAM(S): 250 TABLET, FILM COATED ORAL at 17:52

## 2022-01-01 RX ADMIN — Medication 3 MILLILITER(S): at 17:25

## 2022-01-01 RX ADMIN — Medication 4 MILLILITER(S): at 05:23

## 2022-01-01 RX ADMIN — HEPARIN SODIUM 5000 UNIT(S): 5000 INJECTION INTRAVENOUS; SUBCUTANEOUS at 21:56

## 2022-01-01 RX ADMIN — REMDESIVIR 500 MILLIGRAM(S): 5 INJECTION INTRAVENOUS at 10:56

## 2022-01-01 RX ADMIN — PIPERACILLIN AND TAZOBACTAM 25 GRAM(S): 4; .5 INJECTION, POWDER, LYOPHILIZED, FOR SOLUTION INTRAVENOUS at 10:23

## 2022-01-01 RX ADMIN — SODIUM CHLORIDE 4 MILLILITER(S): 9 INJECTION INTRAMUSCULAR; INTRAVENOUS; SUBCUTANEOUS at 12:40

## 2022-01-01 RX ADMIN — Medication 3 MILLILITER(S): at 12:23

## 2022-01-01 RX ADMIN — Medication 3 MILLILITER(S): at 06:17

## 2022-01-01 RX ADMIN — Medication 50 MILLIEQUIVALENT(S): at 20:07

## 2022-01-01 RX ADMIN — Medication 3 MILLILITER(S): at 01:44

## 2022-01-01 RX ADMIN — Medication 3: at 12:32

## 2022-01-01 RX ADMIN — Medication 75 MICROGRAM(S): at 05:38

## 2022-01-01 RX ADMIN — LEVETIRACETAM 750 MILLIGRAM(S): 250 TABLET, FILM COATED ORAL at 06:38

## 2022-01-01 RX ADMIN — AMIODARONE HYDROCHLORIDE 33.3 MG/MIN: 400 TABLET ORAL at 22:21

## 2022-01-01 RX ADMIN — Medication 30 MILLIGRAM(S): at 21:50

## 2022-01-01 RX ADMIN — SODIUM CHLORIDE 50 MILLILITER(S): 9 INJECTION, SOLUTION INTRAVENOUS at 23:20

## 2022-01-01 RX ADMIN — ERTAPENEM SODIUM 120 MILLIGRAM(S): 1 INJECTION, POWDER, LYOPHILIZED, FOR SOLUTION INTRAMUSCULAR; INTRAVENOUS at 14:04

## 2022-01-01 RX ADMIN — Medication 650 MILLIGRAM(S): at 22:46

## 2022-01-01 RX ADMIN — Medication 4 MILLILITER(S): at 06:16

## 2022-01-01 RX ADMIN — PREGABALIN 1000 MICROGRAM(S): 225 CAPSULE ORAL at 13:32

## 2022-01-01 RX ADMIN — NYSTATIN CREAM 1 APPLICATION(S): 100000 CREAM TOPICAL at 05:30

## 2022-01-01 RX ADMIN — Medication 0.5 MILLIGRAM(S): at 18:12

## 2022-01-01 RX ADMIN — Medication 0.5 MILLIGRAM(S): at 05:05

## 2022-01-01 RX ADMIN — Medication 300 MILLIGRAM(S): at 17:50

## 2022-01-01 RX ADMIN — Medication 25 MILLIGRAM(S): at 05:28

## 2022-01-01 RX ADMIN — Medication 3 MILLILITER(S): at 05:25

## 2022-01-01 RX ADMIN — HEPARIN SODIUM 5000 UNIT(S): 5000 INJECTION INTRAVENOUS; SUBCUTANEOUS at 05:52

## 2022-01-01 RX ADMIN — Medication 1 DROP(S): at 06:17

## 2022-01-01 RX ADMIN — Medication 35 MICROGRAM(S): at 21:54

## 2022-01-01 RX ADMIN — Medication 75 MICROGRAM(S): at 06:05

## 2022-01-01 RX ADMIN — Medication 0.25 MILLIGRAM(S): at 22:23

## 2022-01-01 RX ADMIN — Medication 5 MILLIGRAM(S): at 17:14

## 2022-01-01 RX ADMIN — CHLORHEXIDINE GLUCONATE 1 APPLICATION(S): 213 SOLUTION TOPICAL at 06:00

## 2022-01-01 RX ADMIN — Medication 4 MILLILITER(S): at 23:11

## 2022-01-01 RX ADMIN — Medication 2: at 12:51

## 2022-01-01 RX ADMIN — Medication 20 MILLIGRAM(S): at 06:42

## 2022-01-01 RX ADMIN — Medication 300 MILLIGRAM(S): at 11:45

## 2022-01-01 RX ADMIN — HUMAN INSULIN 9 UNIT(S): 100 INJECTION, SUSPENSION SUBCUTANEOUS at 17:56

## 2022-01-01 RX ADMIN — Medication 5 MILLIGRAM(S): at 22:08

## 2022-01-01 RX ADMIN — LEVETIRACETAM 750 MILLIGRAM(S): 250 TABLET, FILM COATED ORAL at 23:23

## 2022-01-01 RX ADMIN — Medication 37.5 MICROGRAM(S): at 06:51

## 2022-01-01 RX ADMIN — Medication 650 MILLIGRAM(S): at 22:47

## 2022-01-01 RX ADMIN — Medication 300 MILLIGRAM(S): at 23:11

## 2022-01-01 RX ADMIN — PREGABALIN 1000 MICROGRAM(S): 225 CAPSULE ORAL at 13:15

## 2022-01-01 RX ADMIN — Medication 30 MILLIGRAM(S): at 13:20

## 2022-01-01 RX ADMIN — PIPERACILLIN AND TAZOBACTAM 3.38 GRAM(S): 4; .5 INJECTION, POWDER, LYOPHILIZED, FOR SOLUTION INTRAVENOUS at 01:40

## 2022-01-01 RX ADMIN — Medication 20 MILLIGRAM(S): at 12:35

## 2022-01-01 RX ADMIN — Medication 300 MILLIGRAM(S): at 12:57

## 2022-01-01 RX ADMIN — SODIUM CHLORIDE 50 MILLILITER(S): 9 INJECTION, SOLUTION INTRAVENOUS at 08:37

## 2022-01-01 RX ADMIN — Medication 5 MILLIGRAM(S): at 22:20

## 2022-01-01 RX ADMIN — Medication 650 MILLIGRAM(S): at 13:30

## 2022-01-01 RX ADMIN — Medication 3 MILLILITER(S): at 11:30

## 2022-01-01 RX ADMIN — Medication 3 MILLILITER(S): at 06:38

## 2022-01-01 RX ADMIN — Medication 300 MILLIGRAM(S): at 11:53

## 2022-01-01 RX ADMIN — Medication 100 MILLIGRAM(S): at 11:13

## 2022-01-01 RX ADMIN — ATORVASTATIN CALCIUM 20 MILLIGRAM(S): 80 TABLET, FILM COATED ORAL at 22:05

## 2022-01-01 RX ADMIN — Medication 75 MICROGRAM(S): at 06:07

## 2022-01-01 RX ADMIN — ERTAPENEM SODIUM 120 MILLIGRAM(S): 1 INJECTION, POWDER, LYOPHILIZED, FOR SOLUTION INTRAMUSCULAR; INTRAVENOUS at 12:12

## 2022-01-01 RX ADMIN — Medication 650 MILLIGRAM(S): at 12:55

## 2022-01-01 RX ADMIN — Medication 3 MILLILITER(S): at 17:24

## 2022-01-01 RX ADMIN — ANASTROZOLE 1 MILLIGRAM(S): 1 TABLET ORAL at 11:26

## 2022-01-01 RX ADMIN — Medication 100 MILLIGRAM(S): at 11:58

## 2022-01-01 RX ADMIN — HUMAN INSULIN 6 UNIT(S): 100 INJECTION, SUSPENSION SUBCUTANEOUS at 12:37

## 2022-01-01 RX ADMIN — HEPARIN SODIUM 5000 UNIT(S): 5000 INJECTION INTRAVENOUS; SUBCUTANEOUS at 06:17

## 2022-01-01 RX ADMIN — Medication 250 MICROGRAM(S): at 16:09

## 2022-01-01 RX ADMIN — Medication 100 MILLIGRAM(S): at 18:32

## 2022-01-01 RX ADMIN — Medication 300 MILLIGRAM(S): at 05:51

## 2022-01-01 RX ADMIN — Medication 1 TABLET(S): at 09:33

## 2022-01-01 RX ADMIN — Medication 1: at 12:48

## 2022-01-01 RX ADMIN — CHLORHEXIDINE GLUCONATE 1 APPLICATION(S): 213 SOLUTION TOPICAL at 05:23

## 2022-01-01 RX ADMIN — Medication 3 MILLILITER(S): at 23:11

## 2022-01-01 RX ADMIN — Medication 3: at 00:21

## 2022-01-01 RX ADMIN — Medication 1000 MILLIGRAM(S): at 11:36

## 2022-01-01 RX ADMIN — Medication 100 MILLIGRAM(S): at 12:27

## 2022-01-01 RX ADMIN — Medication 3 MILLILITER(S): at 00:46

## 2022-01-01 RX ADMIN — Medication 5 MILLIGRAM(S): at 23:05

## 2022-01-01 RX ADMIN — SODIUM CHLORIDE 50 MILLILITER(S): 9 INJECTION, SOLUTION INTRAVENOUS at 10:29

## 2022-01-01 RX ADMIN — SODIUM CHLORIDE 4 MILLILITER(S): 9 INJECTION INTRAMUSCULAR; INTRAVENOUS; SUBCUTANEOUS at 06:28

## 2022-01-01 RX ADMIN — Medication 3: at 00:24

## 2022-01-01 RX ADMIN — Medication 4 MILLILITER(S): at 05:14

## 2022-01-01 RX ADMIN — SODIUM CHLORIDE 4 MILLILITER(S): 9 INJECTION INTRAMUSCULAR; INTRAVENOUS; SUBCUTANEOUS at 14:16

## 2022-01-01 RX ADMIN — ESCITALOPRAM OXALATE 10 MILLIGRAM(S): 10 TABLET, FILM COATED ORAL at 12:09

## 2022-01-01 RX ADMIN — SODIUM CHLORIDE 4 MILLILITER(S): 9 INJECTION INTRAMUSCULAR; INTRAVENOUS; SUBCUTANEOUS at 23:20

## 2022-01-01 RX ADMIN — PROPOFOL 5.59 MICROGRAM(S)/KG/MIN: 10 INJECTION, EMULSION INTRAVENOUS at 22:43

## 2022-01-01 RX ADMIN — LEVETIRACETAM 750 MILLIGRAM(S): 250 TABLET, FILM COATED ORAL at 21:26

## 2022-01-01 RX ADMIN — SODIUM CHLORIDE 4 MILLILITER(S): 9 INJECTION INTRAMUSCULAR; INTRAVENOUS; SUBCUTANEOUS at 02:53

## 2022-01-01 RX ADMIN — Medication 3 MILLILITER(S): at 05:51

## 2022-01-01 RX ADMIN — Medication 3 MILLILITER(S): at 18:22

## 2022-01-01 RX ADMIN — Medication 45 MILLIGRAM(S): at 05:30

## 2022-01-01 RX ADMIN — Medication 500 MILLIGRAM(S): at 09:21

## 2022-01-01 RX ADMIN — Medication 30 MILLIGRAM(S): at 18:08

## 2022-01-01 RX ADMIN — AMLODIPINE BESYLATE 10 MILLIGRAM(S): 2.5 TABLET ORAL at 18:32

## 2022-01-01 RX ADMIN — Medication 300 MILLIGRAM(S): at 12:28

## 2022-01-01 RX ADMIN — Medication 400 MILLIGRAM(S): at 21:07

## 2022-01-01 RX ADMIN — Medication 3 MILLILITER(S): at 07:05

## 2022-01-01 RX ADMIN — Medication 3 MILLILITER(S): at 17:37

## 2022-01-01 RX ADMIN — LEVETIRACETAM 750 MILLIGRAM(S): 250 TABLET, FILM COATED ORAL at 07:54

## 2022-01-01 RX ADMIN — SODIUM CHLORIDE 4 MILLILITER(S): 9 INJECTION INTRAMUSCULAR; INTRAVENOUS; SUBCUTANEOUS at 12:02

## 2022-01-01 RX ADMIN — HEPARIN SODIUM 5000 UNIT(S): 5000 INJECTION INTRAVENOUS; SUBCUTANEOUS at 21:09

## 2022-01-01 RX ADMIN — SODIUM CHLORIDE 4 MILLILITER(S): 9 INJECTION INTRAMUSCULAR; INTRAVENOUS; SUBCUTANEOUS at 00:01

## 2022-01-01 RX ADMIN — SODIUM CHLORIDE 4 MILLILITER(S): 9 INJECTION INTRAMUSCULAR; INTRAVENOUS; SUBCUTANEOUS at 06:17

## 2022-01-01 RX ADMIN — Medication 3 MILLILITER(S): at 17:47

## 2022-01-01 RX ADMIN — PIPERACILLIN AND TAZOBACTAM 25 GRAM(S): 4; .5 INJECTION, POWDER, LYOPHILIZED, FOR SOLUTION INTRAVENOUS at 09:55

## 2022-01-01 RX ADMIN — Medication 650 MILLIGRAM(S): at 17:25

## 2022-01-01 RX ADMIN — Medication 3 MILLILITER(S): at 12:25

## 2022-01-01 RX ADMIN — HEPARIN SODIUM 5000 UNIT(S): 5000 INJECTION INTRAVENOUS; SUBCUTANEOUS at 15:18

## 2022-01-01 RX ADMIN — SODIUM CHLORIDE 4 MILLILITER(S): 9 INJECTION INTRAMUSCULAR; INTRAVENOUS; SUBCUTANEOUS at 12:00

## 2022-01-01 RX ADMIN — Medication 300 MILLIGRAM(S): at 23:18

## 2022-01-01 RX ADMIN — Medication 4 MILLILITER(S): at 13:05

## 2022-01-01 RX ADMIN — HUMAN INSULIN 9 UNIT(S): 100 INJECTION, SUSPENSION SUBCUTANEOUS at 18:50

## 2022-01-01 RX ADMIN — Medication 3: at 00:46

## 2022-01-01 RX ADMIN — Medication 30 MILLIGRAM(S): at 12:29

## 2022-01-01 RX ADMIN — Medication 75 MICROGRAM(S): at 05:49

## 2022-01-01 RX ADMIN — Medication 300 MILLIGRAM(S): at 05:40

## 2022-01-01 RX ADMIN — HEPARIN SODIUM 5000 UNIT(S): 5000 INJECTION INTRAVENOUS; SUBCUTANEOUS at 17:50

## 2022-01-01 RX ADMIN — FAMOTIDINE 20 MILLIGRAM(S): 10 INJECTION INTRAVENOUS at 06:16

## 2022-01-01 RX ADMIN — NYSTATIN CREAM 1 APPLICATION(S): 100000 CREAM TOPICAL at 06:18

## 2022-01-01 RX ADMIN — Medication 650 MILLIGRAM(S): at 23:41

## 2022-01-01 RX ADMIN — Medication 3 MILLILITER(S): at 05:31

## 2022-01-01 RX ADMIN — SODIUM CHLORIDE 80 MILLILITER(S): 9 INJECTION, SOLUTION INTRAVENOUS at 00:20

## 2022-01-01 RX ADMIN — Medication 0.5 MILLIGRAM(S): at 17:25

## 2022-01-01 RX ADMIN — HEPARIN SODIUM 5000 UNIT(S): 5000 INJECTION INTRAVENOUS; SUBCUTANEOUS at 23:23

## 2022-01-01 RX ADMIN — AMLODIPINE BESYLATE 10 MILLIGRAM(S): 2.5 TABLET ORAL at 18:33

## 2022-01-01 RX ADMIN — ENOXAPARIN SODIUM 40 MILLIGRAM(S): 100 INJECTION SUBCUTANEOUS at 11:17

## 2022-01-01 RX ADMIN — TRAMADOL HYDROCHLORIDE 50 MILLIGRAM(S): 50 TABLET ORAL at 22:00

## 2022-01-01 RX ADMIN — Medication 3 MILLILITER(S): at 10:29

## 2022-01-01 RX ADMIN — Medication 300 MILLIGRAM(S): at 12:41

## 2022-01-01 RX ADMIN — LEVETIRACETAM 400 MILLIGRAM(S): 250 TABLET, FILM COATED ORAL at 18:39

## 2022-01-01 RX ADMIN — Medication 3 MILLIGRAM(S): at 22:09

## 2022-01-01 RX ADMIN — Medication 75 MICROGRAM(S): at 05:12

## 2022-01-01 RX ADMIN — Medication 3 MILLILITER(S): at 11:27

## 2022-01-01 RX ADMIN — Medication 30 MILLIGRAM(S): at 13:08

## 2022-01-01 RX ADMIN — NYSTATIN CREAM 1 APPLICATION(S): 100000 CREAM TOPICAL at 05:24

## 2022-01-01 RX ADMIN — Medication 650 MILLIGRAM(S): at 17:55

## 2022-01-01 RX ADMIN — SODIUM CHLORIDE 4 MILLILITER(S): 9 INJECTION INTRAMUSCULAR; INTRAVENOUS; SUBCUTANEOUS at 23:12

## 2022-01-01 RX ADMIN — Medication 1 DROP(S): at 18:33

## 2022-01-01 RX ADMIN — Medication 3 MILLILITER(S): at 22:59

## 2022-01-01 RX ADMIN — Medication 650 MILLIGRAM(S): at 06:55

## 2022-01-01 RX ADMIN — SODIUM CHLORIDE 4 MILLILITER(S): 9 INJECTION INTRAMUSCULAR; INTRAVENOUS; SUBCUTANEOUS at 17:51

## 2022-01-01 RX ADMIN — Medication 100 MILLIGRAM(S): at 13:16

## 2022-01-01 RX ADMIN — Medication 800 MILLIGRAM(S): at 06:14

## 2022-01-01 RX ADMIN — Medication 25 MILLIGRAM(S): at 17:23

## 2022-01-01 RX ADMIN — Medication 0.5 MILLIGRAM(S): at 17:02

## 2022-01-01 RX ADMIN — Medication 250 MILLIGRAM(S): at 22:48

## 2022-01-01 RX ADMIN — Medication 30 MILLIGRAM(S): at 00:44

## 2022-01-01 RX ADMIN — Medication 4 MILLILITER(S): at 15:29

## 2022-01-01 RX ADMIN — HEPARIN SODIUM 5000 UNIT(S): 5000 INJECTION INTRAVENOUS; SUBCUTANEOUS at 05:45

## 2022-01-01 RX ADMIN — Medication 30 MILLIGRAM(S): at 06:17

## 2022-01-01 RX ADMIN — Medication 3 MILLILITER(S): at 12:08

## 2022-01-01 RX ADMIN — HEPARIN SODIUM 5000 UNIT(S): 5000 INJECTION INTRAVENOUS; SUBCUTANEOUS at 14:46

## 2022-01-01 RX ADMIN — Medication 300 MILLIGRAM(S): at 00:09

## 2022-01-01 RX ADMIN — Medication 3 MILLILITER(S): at 11:04

## 2022-01-01 RX ADMIN — Medication 300 MILLIGRAM(S): at 23:15

## 2022-01-01 RX ADMIN — Medication 3 MILLILITER(S): at 23:31

## 2022-01-01 RX ADMIN — Medication 250 MILLIGRAM(S): at 11:48

## 2022-01-01 RX ADMIN — Medication 75 MICROGRAM(S): at 07:47

## 2022-01-01 RX ADMIN — Medication 100 MILLIGRAM(S): at 11:26

## 2022-01-01 RX ADMIN — HEPARIN SODIUM 5000 UNIT(S): 5000 INJECTION INTRAVENOUS; SUBCUTANEOUS at 14:58

## 2022-01-01 RX ADMIN — SODIUM CHLORIDE 50 MILLILITER(S): 9 INJECTION, SOLUTION INTRAVENOUS at 17:15

## 2022-01-01 RX ADMIN — AMLODIPINE BESYLATE 5 MILLIGRAM(S): 2.5 TABLET ORAL at 05:55

## 2022-01-01 RX ADMIN — Medication 25 MILLIGRAM(S): at 05:46

## 2022-01-01 RX ADMIN — Medication 1 PACKET(S): at 12:08

## 2022-01-01 RX ADMIN — Medication 20 MILLIGRAM(S): at 22:23

## 2022-01-01 RX ADMIN — Medication 300 MILLIGRAM(S): at 05:23

## 2022-01-01 RX ADMIN — Medication 2: at 00:27

## 2022-01-01 RX ADMIN — Medication 3 MILLILITER(S): at 17:23

## 2022-01-01 RX ADMIN — HEPARIN SODIUM 5000 UNIT(S): 5000 INJECTION INTRAVENOUS; SUBCUTANEOUS at 06:47

## 2022-01-01 RX ADMIN — Medication 3 MILLILITER(S): at 12:27

## 2022-01-01 RX ADMIN — Medication 300 MILLIGRAM(S): at 00:49

## 2022-01-01 RX ADMIN — Medication 3 MILLIGRAM(S): at 00:10

## 2022-01-01 RX ADMIN — Medication 650 MILLIGRAM(S): at 15:15

## 2022-01-01 RX ADMIN — Medication 1 DROP(S): at 13:02

## 2022-01-01 RX ADMIN — Medication 300 MILLIGRAM(S): at 12:03

## 2022-01-01 RX ADMIN — Medication 300 MILLIGRAM(S): at 00:47

## 2022-01-01 RX ADMIN — TRAMADOL HYDROCHLORIDE 50 MILLIGRAM(S): 50 TABLET ORAL at 12:30

## 2022-01-01 RX ADMIN — Medication 3 MILLILITER(S): at 13:44

## 2022-01-01 RX ADMIN — Medication 20 MILLIGRAM(S): at 23:36

## 2022-01-01 RX ADMIN — Medication 1 DROP(S): at 21:27

## 2022-01-01 RX ADMIN — SODIUM CHLORIDE 4 MILLILITER(S): 9 INJECTION INTRAMUSCULAR; INTRAVENOUS; SUBCUTANEOUS at 06:22

## 2022-01-01 RX ADMIN — SODIUM CHLORIDE 4 MILLILITER(S): 9 INJECTION INTRAMUSCULAR; INTRAVENOUS; SUBCUTANEOUS at 06:30

## 2022-01-01 RX ADMIN — Medication 1: at 17:46

## 2022-01-01 RX ADMIN — Medication 30 MILLIGRAM(S): at 13:49

## 2022-01-01 RX ADMIN — Medication 125 MICROGRAM(S): at 12:29

## 2022-01-01 RX ADMIN — Medication 300 MILLIGRAM(S): at 17:19

## 2022-01-01 RX ADMIN — HEPARIN SODIUM 5000 UNIT(S): 5000 INJECTION INTRAVENOUS; SUBCUTANEOUS at 22:27

## 2022-01-01 RX ADMIN — Medication 25 MILLIGRAM(S): at 21:28

## 2022-01-01 RX ADMIN — Medication 3: at 17:17

## 2022-01-01 RX ADMIN — Medication 300 MILLIGRAM(S): at 06:33

## 2022-01-01 RX ADMIN — Medication 100 MILLIGRAM(S): at 13:04

## 2022-01-01 RX ADMIN — Medication 100 MILLIGRAM(S): at 16:52

## 2022-01-01 RX ADMIN — NYSTATIN CREAM 1 APPLICATION(S): 100000 CREAM TOPICAL at 06:02

## 2022-01-01 RX ADMIN — Medication 3 MILLILITER(S): at 12:16

## 2022-01-01 RX ADMIN — ATORVASTATIN CALCIUM 20 MILLIGRAM(S): 80 TABLET, FILM COATED ORAL at 22:24

## 2022-01-01 RX ADMIN — Medication 100 MILLIGRAM(S): at 14:09

## 2022-01-01 RX ADMIN — Medication 3 MILLIGRAM(S): at 23:05

## 2022-01-01 RX ADMIN — Medication 3 MILLILITER(S): at 20:23

## 2022-01-01 RX ADMIN — ENOXAPARIN SODIUM 40 MILLIGRAM(S): 100 INJECTION SUBCUTANEOUS at 12:26

## 2022-01-01 RX ADMIN — Medication 1: at 06:35

## 2022-01-01 RX ADMIN — LEVETIRACETAM 750 MILLIGRAM(S): 250 TABLET, FILM COATED ORAL at 05:08

## 2022-01-01 RX ADMIN — Medication 25 MILLIGRAM(S): at 05:49

## 2022-01-01 RX ADMIN — Medication 300 MILLIGRAM(S): at 17:24

## 2022-01-01 RX ADMIN — Medication 1 DROP(S): at 05:13

## 2022-01-01 RX ADMIN — ATORVASTATIN CALCIUM 20 MILLIGRAM(S): 80 TABLET, FILM COATED ORAL at 21:23

## 2022-01-01 RX ADMIN — LEVETIRACETAM 750 MILLIGRAM(S): 250 TABLET, FILM COATED ORAL at 21:10

## 2022-01-01 RX ADMIN — Medication 4 MILLILITER(S): at 23:14

## 2022-01-01 RX ADMIN — Medication 300 MILLIGRAM(S): at 11:13

## 2022-01-01 RX ADMIN — Medication 800 MILLIGRAM(S): at 23:45

## 2022-01-01 RX ADMIN — SODIUM CHLORIDE 4 MILLILITER(S): 9 INJECTION INTRAMUSCULAR; INTRAVENOUS; SUBCUTANEOUS at 17:20

## 2022-01-01 RX ADMIN — Medication 100 MILLIGRAM(S): at 11:17

## 2022-01-01 RX ADMIN — Medication 125 MICROGRAM(S): at 06:39

## 2022-01-01 RX ADMIN — Medication 1000 MILLIGRAM(S): at 21:37

## 2022-01-01 RX ADMIN — Medication 300 MILLIGRAM(S): at 13:01

## 2022-01-01 RX ADMIN — ANASTROZOLE 1 MILLIGRAM(S): 1 TABLET ORAL at 11:58

## 2022-01-01 RX ADMIN — Medication 3 MILLILITER(S): at 07:16

## 2022-01-01 RX ADMIN — LEVETIRACETAM 750 MILLIGRAM(S): 250 TABLET, FILM COATED ORAL at 08:04

## 2022-01-01 RX ADMIN — Medication 300 MILLIGRAM(S): at 17:46

## 2022-01-01 RX ADMIN — Medication 30 MILLIGRAM(S): at 13:03

## 2022-01-01 RX ADMIN — SODIUM CHLORIDE 4 MILLILITER(S): 9 INJECTION INTRAMUSCULAR; INTRAVENOUS; SUBCUTANEOUS at 17:13

## 2022-01-01 RX ADMIN — Medication 1 DROP(S): at 12:18

## 2022-01-01 RX ADMIN — HUMAN INSULIN 10 UNIT(S): 100 INJECTION, SUSPENSION SUBCUTANEOUS at 00:52

## 2022-01-01 RX ADMIN — Medication 300 MILLIGRAM(S): at 11:52

## 2022-01-01 RX ADMIN — LEVETIRACETAM 750 MILLIGRAM(S): 250 TABLET, FILM COATED ORAL at 17:14

## 2022-01-01 RX ADMIN — ENOXAPARIN SODIUM 40 MILLIGRAM(S): 100 INJECTION SUBCUTANEOUS at 22:02

## 2022-01-01 RX ADMIN — Medication 0.5 MILLIGRAM(S): at 18:11

## 2022-01-01 RX ADMIN — HUMAN INSULIN 10 UNIT(S): 100 INJECTION, SUSPENSION SUBCUTANEOUS at 12:51

## 2022-01-01 RX ADMIN — ATORVASTATIN CALCIUM 20 MILLIGRAM(S): 80 TABLET, FILM COATED ORAL at 22:59

## 2022-01-01 RX ADMIN — ANASTROZOLE 1 MILLIGRAM(S): 1 TABLET ORAL at 20:22

## 2022-01-01 RX ADMIN — CHLORHEXIDINE GLUCONATE 1 APPLICATION(S): 213 SOLUTION TOPICAL at 05:47

## 2022-01-01 RX ADMIN — Medication 4 MILLILITER(S): at 05:48

## 2022-01-01 RX ADMIN — SENNA PLUS 2 TABLET(S): 8.6 TABLET ORAL at 22:21

## 2022-01-01 RX ADMIN — Medication 300 MILLIGRAM(S): at 11:54

## 2022-01-01 RX ADMIN — Medication 100 MILLIGRAM(S): at 11:05

## 2022-01-01 RX ADMIN — Medication 650 MILLIGRAM(S): at 21:48

## 2022-01-01 RX ADMIN — Medication 100 MILLIGRAM(S): at 12:10

## 2022-01-01 RX ADMIN — TRAMADOL HYDROCHLORIDE 50 MILLIGRAM(S): 50 TABLET ORAL at 11:30

## 2022-01-01 RX ADMIN — Medication 650 MILLIGRAM(S): at 06:57

## 2022-01-01 RX ADMIN — LEVETIRACETAM 750 MILLIGRAM(S): 250 TABLET, FILM COATED ORAL at 18:06

## 2022-01-01 RX ADMIN — Medication 300 MILLIGRAM(S): at 19:49

## 2022-01-01 RX ADMIN — Medication 30 MILLIGRAM(S): at 12:52

## 2022-01-01 RX ADMIN — Medication 20 MILLIGRAM(S): at 00:49

## 2022-01-01 RX ADMIN — Medication 650 MILLIGRAM(S): at 01:20

## 2022-01-01 RX ADMIN — Medication 75 MICROGRAM(S): at 06:17

## 2022-01-01 RX ADMIN — ATORVASTATIN CALCIUM 20 MILLIGRAM(S): 80 TABLET, FILM COATED ORAL at 21:07

## 2022-01-01 RX ADMIN — ATORVASTATIN CALCIUM 20 MILLIGRAM(S): 80 TABLET, FILM COATED ORAL at 22:02

## 2022-01-01 RX ADMIN — Medication 3 MILLILITER(S): at 05:11

## 2022-01-01 RX ADMIN — Medication 300 MILLIGRAM(S): at 17:14

## 2022-01-01 RX ADMIN — SODIUM CHLORIDE 4 MILLILITER(S): 9 INJECTION INTRAMUSCULAR; INTRAVENOUS; SUBCUTANEOUS at 18:38

## 2022-01-01 RX ADMIN — Medication 650 MILLIGRAM(S): at 22:20

## 2022-01-01 RX ADMIN — Medication 3 MILLILITER(S): at 00:38

## 2022-01-01 RX ADMIN — INSULIN HUMAN 6 UNIT(S): 100 INJECTION, SOLUTION SUBCUTANEOUS at 21:27

## 2022-01-01 RX ADMIN — Medication 100 MILLIGRAM(S): at 21:28

## 2022-01-01 RX ADMIN — SODIUM CHLORIDE 50 MILLILITER(S): 9 INJECTION, SOLUTION INTRAVENOUS at 00:42

## 2022-01-01 RX ADMIN — Medication 25 MILLIGRAM(S): at 09:50

## 2022-01-01 RX ADMIN — Medication 300 MILLIGRAM(S): at 12:08

## 2022-01-01 RX ADMIN — Medication 0.5 MILLIGRAM(S): at 18:02

## 2022-01-01 RX ADMIN — Medication 3: at 17:38

## 2022-01-01 RX ADMIN — Medication 100 MILLIGRAM(S): at 13:02

## 2022-01-01 RX ADMIN — Medication 30 MILLILITER(S): at 21:03

## 2022-01-01 RX ADMIN — Medication 650 MILLIGRAM(S): at 23:21

## 2022-01-01 RX ADMIN — Medication 30 MILLIGRAM(S): at 22:46

## 2022-01-01 RX ADMIN — Medication 650 MILLIGRAM(S): at 15:40

## 2022-01-01 RX ADMIN — SENNA PLUS 2 TABLET(S): 8.6 TABLET ORAL at 22:25

## 2022-01-01 RX ADMIN — Medication 75 MICROGRAM(S): at 06:42

## 2022-01-01 RX ADMIN — Medication 25 MILLIGRAM(S): at 05:30

## 2022-01-01 RX ADMIN — Medication 3 MILLILITER(S): at 05:03

## 2022-01-01 RX ADMIN — Medication 20 MILLIGRAM(S): at 08:10

## 2022-01-01 RX ADMIN — LEVETIRACETAM 750 MILLIGRAM(S): 250 TABLET, FILM COATED ORAL at 10:01

## 2022-01-01 RX ADMIN — SODIUM CHLORIDE 4 MILLILITER(S): 9 INJECTION INTRAMUSCULAR; INTRAVENOUS; SUBCUTANEOUS at 13:22

## 2022-01-01 RX ADMIN — TRAMADOL HYDROCHLORIDE 50 MILLIGRAM(S): 50 TABLET ORAL at 21:35

## 2022-01-01 RX ADMIN — Medication 650 MILLIGRAM(S): at 18:08

## 2022-01-01 RX ADMIN — TRAMADOL HYDROCHLORIDE 50 MILLIGRAM(S): 50 TABLET ORAL at 11:14

## 2022-01-01 RX ADMIN — Medication 650 MILLIGRAM(S): at 06:52

## 2022-01-01 RX ADMIN — Medication 20 MILLIGRAM(S): at 14:13

## 2022-01-01 RX ADMIN — Medication 650 MILLIGRAM(S): at 06:06

## 2022-01-01 RX ADMIN — CHLORHEXIDINE GLUCONATE 1 APPLICATION(S): 213 SOLUTION TOPICAL at 11:29

## 2022-01-01 RX ADMIN — Medication 3 MILLILITER(S): at 06:29

## 2022-01-01 RX ADMIN — Medication 2: at 17:56

## 2022-01-01 RX ADMIN — RIVAROXABAN 20 MILLIGRAM(S): KIT at 18:19

## 2022-01-01 RX ADMIN — Medication 3 MILLILITER(S): at 11:12

## 2022-01-01 RX ADMIN — ALBUTEROL 2 PUFF(S): 90 AEROSOL, METERED ORAL at 17:52

## 2022-01-01 RX ADMIN — Medication 4 MILLILITER(S): at 13:01

## 2022-01-01 RX ADMIN — Medication 4 MILLILITER(S): at 13:17

## 2022-01-01 RX ADMIN — Medication 300 MILLIGRAM(S): at 01:07

## 2022-01-01 RX ADMIN — ENOXAPARIN SODIUM 40 MILLIGRAM(S): 100 INJECTION SUBCUTANEOUS at 15:51

## 2022-01-01 RX ADMIN — SODIUM CHLORIDE 4 MILLILITER(S): 9 INJECTION INTRAMUSCULAR; INTRAVENOUS; SUBCUTANEOUS at 23:25

## 2022-01-01 RX ADMIN — LEVETIRACETAM 750 MILLIGRAM(S): 250 TABLET, FILM COATED ORAL at 18:31

## 2022-01-01 RX ADMIN — Medication 4 MILLILITER(S): at 13:11

## 2022-01-01 RX ADMIN — LEVETIRACETAM 750 MILLIGRAM(S): 250 TABLET, FILM COATED ORAL at 06:21

## 2022-01-01 RX ADMIN — Medication 650 MILLIGRAM(S): at 02:16

## 2022-01-01 RX ADMIN — Medication 30 MILLIGRAM(S): at 06:03

## 2022-01-01 RX ADMIN — Medication 1 DROP(S): at 14:40

## 2022-01-01 RX ADMIN — Medication 100 MILLIGRAM(S): at 13:34

## 2022-01-01 RX ADMIN — CHLORHEXIDINE GLUCONATE 1 APPLICATION(S): 213 SOLUTION TOPICAL at 05:42

## 2022-01-01 RX ADMIN — ERTAPENEM SODIUM 120 MILLIGRAM(S): 1 INJECTION, POWDER, LYOPHILIZED, FOR SOLUTION INTRAMUSCULAR; INTRAVENOUS at 16:19

## 2022-01-01 RX ADMIN — Medication 3 MILLILITER(S): at 18:25

## 2022-01-01 RX ADMIN — Medication 650 MILLIGRAM(S): at 10:08

## 2022-01-01 RX ADMIN — Medication 75 MICROGRAM(S): at 08:09

## 2022-01-01 RX ADMIN — Medication 3 MILLILITER(S): at 09:28

## 2022-01-01 RX ADMIN — ENOXAPARIN SODIUM 40 MILLIGRAM(S): 100 INJECTION SUBCUTANEOUS at 18:07

## 2022-01-01 RX ADMIN — Medication 4 MILLILITER(S): at 23:39

## 2022-01-01 RX ADMIN — HUMAN INSULIN 6 UNIT(S): 100 INJECTION, SUSPENSION SUBCUTANEOUS at 06:43

## 2022-01-01 RX ADMIN — HEPARIN SODIUM 5000 UNIT(S): 5000 INJECTION INTRAVENOUS; SUBCUTANEOUS at 22:02

## 2022-01-01 RX ADMIN — Medication 100 MILLIGRAM(S): at 13:25

## 2022-01-01 RX ADMIN — Medication 3 MILLILITER(S): at 23:46

## 2022-01-01 RX ADMIN — HEPARIN SODIUM 5000 UNIT(S): 5000 INJECTION INTRAVENOUS; SUBCUTANEOUS at 22:05

## 2022-01-01 RX ADMIN — SODIUM CHLORIDE 4 MILLILITER(S): 9 INJECTION INTRAMUSCULAR; INTRAVENOUS; SUBCUTANEOUS at 06:43

## 2022-01-01 RX ADMIN — Medication 3 MILLILITER(S): at 17:52

## 2022-01-01 RX ADMIN — NYSTATIN CREAM 1 APPLICATION(S): 100000 CREAM TOPICAL at 17:18

## 2022-01-01 RX ADMIN — Medication 250 MILLIGRAM(S): at 20:25

## 2022-01-01 RX ADMIN — ERTAPENEM SODIUM 120 MILLIGRAM(S): 1 INJECTION, POWDER, LYOPHILIZED, FOR SOLUTION INTRAMUSCULAR; INTRAVENOUS at 10:50

## 2022-01-01 RX ADMIN — HUMAN INSULIN 7 UNIT(S): 100 INJECTION, SUSPENSION SUBCUTANEOUS at 01:33

## 2022-01-01 RX ADMIN — Medication 100 MILLIGRAM(S): at 12:18

## 2022-01-01 RX ADMIN — SENNA PLUS 2 TABLET(S): 8.6 TABLET ORAL at 22:08

## 2022-01-01 RX ADMIN — Medication 1000 MILLIGRAM(S): at 01:30

## 2022-01-01 RX ADMIN — Medication 250 MILLIGRAM(S): at 22:02

## 2022-01-01 RX ADMIN — SODIUM CHLORIDE 4 MILLILITER(S): 9 INJECTION INTRAMUSCULAR; INTRAVENOUS; SUBCUTANEOUS at 23:31

## 2022-01-01 RX ADMIN — Medication 250 MILLIGRAM(S): at 15:43

## 2022-01-01 RX ADMIN — LEVETIRACETAM 750 MILLIGRAM(S): 250 TABLET, FILM COATED ORAL at 06:46

## 2022-01-01 RX ADMIN — Medication 30 MILLIGRAM(S): at 17:02

## 2022-01-01 RX ADMIN — Medication 3 MILLILITER(S): at 18:13

## 2022-01-01 RX ADMIN — Medication 4: at 22:22

## 2022-01-01 RX ADMIN — Medication 3 MILLILITER(S): at 00:53

## 2022-01-01 RX ADMIN — Medication 3 MILLILITER(S): at 18:28

## 2022-01-01 RX ADMIN — Medication 0.5 MILLIGRAM(S): at 06:34

## 2022-01-01 RX ADMIN — SODIUM CHLORIDE 4 MILLILITER(S): 9 INJECTION INTRAMUSCULAR; INTRAVENOUS; SUBCUTANEOUS at 19:13

## 2022-01-01 RX ADMIN — Medication 250 MILLIGRAM(S): at 23:16

## 2022-01-01 RX ADMIN — ANASTROZOLE 1 MILLIGRAM(S): 1 TABLET ORAL at 12:55

## 2022-01-01 RX ADMIN — SODIUM CHLORIDE 4 MILLILITER(S): 9 INJECTION INTRAMUSCULAR; INTRAVENOUS; SUBCUTANEOUS at 13:01

## 2022-01-01 RX ADMIN — SODIUM CHLORIDE 4 MILLILITER(S): 9 INJECTION INTRAMUSCULAR; INTRAVENOUS; SUBCUTANEOUS at 23:02

## 2022-01-01 RX ADMIN — Medication 3 MILLILITER(S): at 05:07

## 2022-01-01 RX ADMIN — Medication 30 MILLIGRAM(S): at 06:33

## 2022-01-01 RX ADMIN — Medication 30 MILLIGRAM(S): at 19:11

## 2022-01-01 RX ADMIN — ANASTROZOLE 1 MILLIGRAM(S): 1 TABLET ORAL at 12:02

## 2022-01-01 RX ADMIN — Medication 650 MILLIGRAM(S): at 17:46

## 2022-01-01 RX ADMIN — TRAMADOL HYDROCHLORIDE 50 MILLIGRAM(S): 50 TABLET ORAL at 06:43

## 2022-01-01 RX ADMIN — Medication 75 MICROGRAM(S): at 06:02

## 2022-01-01 RX ADMIN — Medication 1 DROP(S): at 17:14

## 2022-01-01 RX ADMIN — Medication 1: at 17:58

## 2022-01-01 RX ADMIN — Medication 25 MILLIGRAM(S): at 09:32

## 2022-01-01 RX ADMIN — LEVETIRACETAM 750 MILLIGRAM(S): 250 TABLET, FILM COATED ORAL at 06:32

## 2022-01-01 RX ADMIN — Medication 20 MILLIGRAM(S): at 12:46

## 2022-01-01 RX ADMIN — HUMAN INSULIN 7 UNIT(S): 100 INJECTION, SUSPENSION SUBCUTANEOUS at 08:11

## 2022-01-01 RX ADMIN — Medication 30 MILLIGRAM(S): at 17:50

## 2022-01-01 RX ADMIN — Medication 650 MILLIGRAM(S): at 15:11

## 2022-01-01 RX ADMIN — Medication 100 MILLIGRAM(S): at 11:29

## 2022-01-01 RX ADMIN — SODIUM CHLORIDE 4 MILLILITER(S): 9 INJECTION INTRAMUSCULAR; INTRAVENOUS; SUBCUTANEOUS at 12:18

## 2022-01-01 RX ADMIN — Medication 300 MILLIGRAM(S): at 00:30

## 2022-01-01 RX ADMIN — HEPARIN SODIUM 5000 UNIT(S): 5000 INJECTION INTRAVENOUS; SUBCUTANEOUS at 13:33

## 2022-01-01 RX ADMIN — Medication 4 MILLILITER(S): at 06:02

## 2022-01-01 RX ADMIN — Medication 20 MILLIGRAM(S): at 08:06

## 2022-01-01 RX ADMIN — Medication 650 MILLIGRAM(S): at 02:43

## 2022-01-01 RX ADMIN — ERTAPENEM SODIUM 120 MILLIGRAM(S): 1 INJECTION, POWDER, LYOPHILIZED, FOR SOLUTION INTRAMUSCULAR; INTRAVENOUS at 14:58

## 2022-01-01 RX ADMIN — Medication 30 MILLIGRAM(S): at 13:14

## 2022-01-01 RX ADMIN — LEVETIRACETAM 750 MILLIGRAM(S): 250 TABLET, FILM COATED ORAL at 10:11

## 2022-01-01 RX ADMIN — Medication 3 MILLILITER(S): at 06:42

## 2022-01-01 RX ADMIN — Medication 4 MILLILITER(S): at 11:53

## 2022-01-01 RX ADMIN — HEPARIN SODIUM 5000 UNIT(S): 5000 INJECTION INTRAVENOUS; SUBCUTANEOUS at 05:54

## 2022-01-01 RX ADMIN — Medication 1 DROP(S): at 17:29

## 2022-01-01 RX ADMIN — LEVETIRACETAM 750 MILLIGRAM(S): 250 TABLET, FILM COATED ORAL at 17:27

## 2022-01-01 RX ADMIN — Medication 30 MILLIGRAM(S): at 06:08

## 2022-01-01 RX ADMIN — Medication 5 MILLIGRAM(S): at 06:01

## 2022-01-01 RX ADMIN — TRAMADOL HYDROCHLORIDE 50 MILLIGRAM(S): 50 TABLET ORAL at 20:37

## 2022-01-01 RX ADMIN — Medication 4 MILLILITER(S): at 18:38

## 2022-01-01 RX ADMIN — ANASTROZOLE 1 MILLIGRAM(S): 1 TABLET ORAL at 12:44

## 2022-01-01 RX ADMIN — TRAMADOL HYDROCHLORIDE 50 MILLIGRAM(S): 50 TABLET ORAL at 20:51

## 2022-01-01 RX ADMIN — SODIUM CHLORIDE 4 MILLILITER(S): 9 INJECTION INTRAMUSCULAR; INTRAVENOUS; SUBCUTANEOUS at 13:43

## 2022-01-01 RX ADMIN — SODIUM CHLORIDE 4 MILLILITER(S): 9 INJECTION INTRAMUSCULAR; INTRAVENOUS; SUBCUTANEOUS at 09:27

## 2022-01-01 RX ADMIN — Medication 300 MILLIGRAM(S): at 11:25

## 2022-01-01 RX ADMIN — Medication 0.5 MILLIGRAM(S): at 05:11

## 2022-01-01 RX ADMIN — Medication 1 DROP(S): at 06:30

## 2022-01-01 RX ADMIN — Medication 300 MILLIGRAM(S): at 06:14

## 2022-01-01 RX ADMIN — Medication 650 MILLIGRAM(S): at 11:59

## 2022-01-01 RX ADMIN — LEVETIRACETAM 750 MILLIGRAM(S): 250 TABLET, FILM COATED ORAL at 05:38

## 2022-01-01 RX ADMIN — Medication 1 DROP(S): at 06:01

## 2022-01-01 RX ADMIN — HUMAN INSULIN 7 UNIT(S): 100 INJECTION, SUSPENSION SUBCUTANEOUS at 12:35

## 2022-01-01 RX ADMIN — RIVAROXABAN 20 MILLIGRAM(S): KIT at 17:57

## 2022-01-01 RX ADMIN — Medication 300 MILLIGRAM(S): at 23:43

## 2022-01-01 RX ADMIN — Medication 650 MILLIGRAM(S): at 00:15

## 2022-01-01 RX ADMIN — SENNA PLUS 2 TABLET(S): 8.6 TABLET ORAL at 22:34

## 2022-01-01 RX ADMIN — LEVETIRACETAM 750 MILLIGRAM(S): 250 TABLET, FILM COATED ORAL at 07:05

## 2022-01-01 RX ADMIN — HEPARIN SODIUM 5000 UNIT(S): 5000 INJECTION INTRAVENOUS; SUBCUTANEOUS at 22:17

## 2022-01-01 RX ADMIN — ATORVASTATIN CALCIUM 20 MILLIGRAM(S): 80 TABLET, FILM COATED ORAL at 21:44

## 2022-01-01 RX ADMIN — Medication 3 MILLILITER(S): at 13:46

## 2022-01-01 RX ADMIN — Medication 5 MILLIGRAM(S): at 22:28

## 2022-01-01 RX ADMIN — Medication 650 MILLIGRAM(S): at 12:19

## 2022-01-01 RX ADMIN — Medication 500 MICROGRAM(S): at 02:52

## 2022-01-01 RX ADMIN — Medication 2: at 12:00

## 2022-01-01 RX ADMIN — CHLORHEXIDINE GLUCONATE 1 APPLICATION(S): 213 SOLUTION TOPICAL at 06:18

## 2022-01-01 RX ADMIN — Medication 300 MILLIGRAM(S): at 06:22

## 2022-01-01 RX ADMIN — Medication 300 MILLIGRAM(S): at 23:21

## 2022-01-01 RX ADMIN — Medication 3 MILLILITER(S): at 22:29

## 2022-01-01 RX ADMIN — HEPARIN SODIUM 5000 UNIT(S): 5000 INJECTION INTRAVENOUS; SUBCUTANEOUS at 22:06

## 2022-01-01 RX ADMIN — Medication 3 MILLILITER(S): at 06:19

## 2022-01-01 RX ADMIN — ATORVASTATIN CALCIUM 20 MILLIGRAM(S): 80 TABLET, FILM COATED ORAL at 22:53

## 2022-01-01 RX ADMIN — Medication 3 MILLILITER(S): at 23:33

## 2022-01-01 RX ADMIN — Medication 650 MILLIGRAM(S): at 17:15

## 2022-01-01 RX ADMIN — Medication 100 MILLIGRAM(S): at 09:33

## 2022-01-01 RX ADMIN — Medication 3 MILLILITER(S): at 05:53

## 2022-01-01 RX ADMIN — Medication 100 MILLIGRAM(S): at 13:07

## 2022-01-01 RX ADMIN — Medication 3 MILLILITER(S): at 12:46

## 2022-01-01 RX ADMIN — Medication 30 MILLIGRAM(S): at 06:25

## 2022-01-01 RX ADMIN — Medication 4 MILLILITER(S): at 17:38

## 2022-01-01 RX ADMIN — TRAMADOL HYDROCHLORIDE 50 MILLIGRAM(S): 50 TABLET ORAL at 21:14

## 2022-01-01 RX ADMIN — ATORVASTATIN CALCIUM 20 MILLIGRAM(S): 80 TABLET, FILM COATED ORAL at 22:31

## 2022-01-01 RX ADMIN — Medication 300 MILLIGRAM(S): at 00:27

## 2022-01-01 RX ADMIN — Medication 3 MILLILITER(S): at 18:39

## 2022-01-01 RX ADMIN — Medication 1: at 00:35

## 2022-01-01 RX ADMIN — HEPARIN SODIUM 5000 UNIT(S): 5000 INJECTION INTRAVENOUS; SUBCUTANEOUS at 13:01

## 2022-01-01 RX ADMIN — ENOXAPARIN SODIUM 40 MILLIGRAM(S): 100 INJECTION SUBCUTANEOUS at 17:47

## 2022-01-01 RX ADMIN — HUMAN INSULIN 7 UNIT(S): 100 INJECTION, SUSPENSION SUBCUTANEOUS at 17:47

## 2022-01-01 RX ADMIN — Medication 300 MILLIGRAM(S): at 17:47

## 2022-01-01 RX ADMIN — HEPARIN SODIUM 5000 UNIT(S): 5000 INJECTION INTRAVENOUS; SUBCUTANEOUS at 06:16

## 2022-01-01 RX ADMIN — HUMAN INSULIN 7 UNIT(S): 100 INJECTION, SUSPENSION SUBCUTANEOUS at 12:19

## 2022-01-01 RX ADMIN — Medication 300 MILLIGRAM(S): at 05:55

## 2022-01-01 RX ADMIN — SODIUM CHLORIDE 4 MILLILITER(S): 9 INJECTION INTRAMUSCULAR; INTRAVENOUS; SUBCUTANEOUS at 17:57

## 2022-01-01 RX ADMIN — Medication 300 MILLIGRAM(S): at 14:14

## 2022-01-01 RX ADMIN — Medication 45 MILLIGRAM(S): at 19:38

## 2022-01-01 RX ADMIN — Medication 3 MILLILITER(S): at 11:28

## 2022-01-01 RX ADMIN — Medication 4 MILLILITER(S): at 12:05

## 2022-01-01 RX ADMIN — SODIUM CHLORIDE 4 MILLILITER(S): 9 INJECTION INTRAMUSCULAR; INTRAVENOUS; SUBCUTANEOUS at 06:23

## 2022-01-01 RX ADMIN — TRAMADOL HYDROCHLORIDE 50 MILLIGRAM(S): 50 TABLET ORAL at 21:49

## 2022-01-01 RX ADMIN — Medication 30 MILLIGRAM(S): at 00:09

## 2022-01-01 RX ADMIN — Medication 30 MILLIGRAM(S): at 07:04

## 2022-01-01 RX ADMIN — Medication 4 MILLILITER(S): at 06:29

## 2022-01-01 RX ADMIN — LEVETIRACETAM 750 MILLIGRAM(S): 250 TABLET, FILM COATED ORAL at 11:06

## 2022-01-01 RX ADMIN — Medication 300 MILLIGRAM(S): at 18:07

## 2022-01-01 RX ADMIN — LEVETIRACETAM 750 MILLIGRAM(S): 250 TABLET, FILM COATED ORAL at 09:54

## 2022-01-01 RX ADMIN — Medication 650 MILLIGRAM(S): at 14:26

## 2022-01-01 RX ADMIN — Medication 0.25 MILLIGRAM(S): at 13:25

## 2022-01-01 RX ADMIN — Medication 650 MILLIGRAM(S): at 09:35

## 2022-01-01 RX ADMIN — Medication 100 MILLIGRAM(S): at 14:49

## 2022-01-01 RX ADMIN — Medication 300 MILLIGRAM(S): at 18:21

## 2022-01-01 RX ADMIN — TRAMADOL HYDROCHLORIDE 50 MILLIGRAM(S): 50 TABLET ORAL at 17:32

## 2022-01-01 RX ADMIN — Medication 1 DROP(S): at 05:29

## 2022-01-01 RX ADMIN — HEPARIN SODIUM 5000 UNIT(S): 5000 INJECTION INTRAVENOUS; SUBCUTANEOUS at 14:23

## 2022-01-01 RX ADMIN — Medication 45 MILLIGRAM(S): at 05:09

## 2022-01-01 RX ADMIN — HUMAN INSULIN 7 UNIT(S): 100 INJECTION, SUSPENSION SUBCUTANEOUS at 17:33

## 2022-01-01 RX ADMIN — PREGABALIN 1000 MICROGRAM(S): 225 CAPSULE ORAL at 11:53

## 2022-01-01 RX ADMIN — Medication 20 MILLIGRAM(S): at 15:15

## 2022-01-01 RX ADMIN — Medication 1 DROP(S): at 11:13

## 2022-01-01 RX ADMIN — Medication 1: at 23:51

## 2022-01-01 RX ADMIN — LEVETIRACETAM 400 MILLIGRAM(S): 250 TABLET, FILM COATED ORAL at 19:23

## 2022-01-01 RX ADMIN — LEVETIRACETAM 750 MILLIGRAM(S): 250 TABLET, FILM COATED ORAL at 05:24

## 2022-01-01 RX ADMIN — HEPARIN SODIUM 5000 UNIT(S): 5000 INJECTION INTRAVENOUS; SUBCUTANEOUS at 05:18

## 2022-01-01 RX ADMIN — Medication 300 MILLIGRAM(S): at 13:21

## 2022-01-01 RX ADMIN — Medication 100 MILLIGRAM(S): at 13:01

## 2022-01-01 RX ADMIN — ANASTROZOLE 1 MILLIGRAM(S): 1 TABLET ORAL at 13:52

## 2022-01-01 RX ADMIN — Medication 3 MILLILITER(S): at 00:16

## 2022-01-01 RX ADMIN — ATORVASTATIN CALCIUM 20 MILLIGRAM(S): 80 TABLET, FILM COATED ORAL at 23:10

## 2022-01-01 RX ADMIN — Medication 3 MILLILITER(S): at 09:09

## 2022-01-01 RX ADMIN — Medication 0.5 MILLIGRAM(S): at 06:47

## 2022-01-01 RX ADMIN — Medication 500 MILLIGRAM(S): at 08:04

## 2022-01-01 RX ADMIN — LEVETIRACETAM 750 MILLIGRAM(S): 250 TABLET, FILM COATED ORAL at 09:32

## 2022-01-01 RX ADMIN — TRAMADOL HYDROCHLORIDE 50 MILLIGRAM(S): 50 TABLET ORAL at 22:40

## 2022-01-01 RX ADMIN — Medication 75 MICROGRAM(S): at 05:06

## 2022-01-01 RX ADMIN — Medication 3 MILLILITER(S): at 06:25

## 2022-01-01 RX ADMIN — ATORVASTATIN CALCIUM 20 MILLIGRAM(S): 80 TABLET, FILM COATED ORAL at 21:37

## 2022-01-01 RX ADMIN — SODIUM CHLORIDE 4 MILLILITER(S): 9 INJECTION INTRAMUSCULAR; INTRAVENOUS; SUBCUTANEOUS at 12:22

## 2022-01-01 RX ADMIN — Medication 25 MILLIGRAM(S): at 22:26

## 2022-01-01 RX ADMIN — Medication 300 MILLIGRAM(S): at 00:24

## 2022-01-01 RX ADMIN — Medication 20 MILLIGRAM(S): at 18:29

## 2022-01-01 RX ADMIN — Medication 20 MILLIGRAM(S): at 08:21

## 2022-01-01 RX ADMIN — SODIUM CHLORIDE 4 MILLILITER(S): 9 INJECTION INTRAMUSCULAR; INTRAVENOUS; SUBCUTANEOUS at 05:11

## 2022-01-01 RX ADMIN — Medication 4 MILLILITER(S): at 02:13

## 2022-01-01 RX ADMIN — Medication 100 MILLIGRAM(S): at 11:59

## 2022-01-01 RX ADMIN — SODIUM CHLORIDE 80 MILLILITER(S): 9 INJECTION, SOLUTION INTRAVENOUS at 16:20

## 2022-01-01 RX ADMIN — LEVETIRACETAM 750 MILLIGRAM(S): 250 TABLET, FILM COATED ORAL at 18:29

## 2022-01-01 RX ADMIN — SODIUM CHLORIDE 4 MILLILITER(S): 9 INJECTION INTRAMUSCULAR; INTRAVENOUS; SUBCUTANEOUS at 11:49

## 2022-01-01 RX ADMIN — Medication 1 DROP(S): at 18:10

## 2022-01-01 RX ADMIN — Medication 2: at 23:28

## 2022-01-01 RX ADMIN — SODIUM CHLORIDE 4 MILLILITER(S): 9 INJECTION INTRAMUSCULAR; INTRAVENOUS; SUBCUTANEOUS at 06:36

## 2022-01-01 RX ADMIN — SODIUM CHLORIDE 4 MILLILITER(S): 9 INJECTION INTRAMUSCULAR; INTRAVENOUS; SUBCUTANEOUS at 11:47

## 2022-01-01 RX ADMIN — Medication 1 DROP(S): at 05:52

## 2022-01-01 RX ADMIN — Medication 3 MILLILITER(S): at 22:47

## 2022-01-01 RX ADMIN — TRAMADOL HYDROCHLORIDE 50 MILLIGRAM(S): 50 TABLET ORAL at 11:57

## 2022-01-01 RX ADMIN — SODIUM CHLORIDE 4 MILLILITER(S): 9 INJECTION INTRAMUSCULAR; INTRAVENOUS; SUBCUTANEOUS at 18:59

## 2022-01-01 RX ADMIN — Medication 1: at 00:55

## 2022-01-01 RX ADMIN — Medication 3 MILLILITER(S): at 17:56

## 2022-01-01 RX ADMIN — Medication 4 MILLILITER(S): at 05:22

## 2022-01-01 RX ADMIN — LEVETIRACETAM 750 MILLIGRAM(S): 250 TABLET, FILM COATED ORAL at 18:11

## 2022-01-01 RX ADMIN — Medication 300 MILLIGRAM(S): at 05:45

## 2022-01-01 RX ADMIN — Medication 30 MILLIGRAM(S): at 01:02

## 2022-01-01 RX ADMIN — Medication 75 MICROGRAM(S): at 05:23

## 2022-01-01 RX ADMIN — Medication 3 MILLIGRAM(S): at 22:57

## 2022-01-01 RX ADMIN — Medication 25 MILLIGRAM(S): at 22:05

## 2022-01-01 RX ADMIN — CHLORHEXIDINE GLUCONATE 1 APPLICATION(S): 213 SOLUTION TOPICAL at 10:00

## 2022-01-01 RX ADMIN — Medication 1 PACKET(S): at 12:25

## 2022-01-01 RX ADMIN — Medication 3 MILLILITER(S): at 17:45

## 2022-01-01 RX ADMIN — Medication 1 DROP(S): at 21:18

## 2022-01-01 RX ADMIN — Medication 3 MILLILITER(S): at 11:54

## 2022-01-01 RX ADMIN — RIVAROXABAN 10 MILLIGRAM(S): KIT at 11:14

## 2022-01-01 RX ADMIN — CHLORHEXIDINE GLUCONATE 1 APPLICATION(S): 213 SOLUTION TOPICAL at 06:36

## 2022-01-01 RX ADMIN — Medication 650 MILLIGRAM(S): at 23:34

## 2022-01-01 RX ADMIN — Medication 1 DROP(S): at 06:08

## 2022-01-01 RX ADMIN — SODIUM CHLORIDE 4 MILLILITER(S): 9 INJECTION INTRAMUSCULAR; INTRAVENOUS; SUBCUTANEOUS at 05:32

## 2022-01-01 RX ADMIN — Medication 3 MILLILITER(S): at 00:14

## 2022-01-01 RX ADMIN — Medication 30 MILLIGRAM(S): at 15:12

## 2022-01-01 RX ADMIN — LEVETIRACETAM 750 MILLIGRAM(S): 250 TABLET, FILM COATED ORAL at 19:35

## 2022-01-01 RX ADMIN — CHLORHEXIDINE GLUCONATE 1 APPLICATION(S): 213 SOLUTION TOPICAL at 06:30

## 2022-01-01 RX ADMIN — Medication 100 MILLIGRAM(S): at 12:36

## 2022-01-01 RX ADMIN — SODIUM CHLORIDE 4 MILLILITER(S): 9 INJECTION INTRAMUSCULAR; INTRAVENOUS; SUBCUTANEOUS at 05:23

## 2022-01-01 RX ADMIN — LEVETIRACETAM 750 MILLIGRAM(S): 250 TABLET, FILM COATED ORAL at 06:19

## 2022-01-01 RX ADMIN — HEPARIN SODIUM 5000 UNIT(S): 5000 INJECTION INTRAVENOUS; SUBCUTANEOUS at 14:56

## 2022-01-01 RX ADMIN — SODIUM CHLORIDE 4 MILLILITER(S): 9 INJECTION INTRAMUSCULAR; INTRAVENOUS; SUBCUTANEOUS at 06:27

## 2022-01-01 RX ADMIN — Medication 650 MILLIGRAM(S): at 18:30

## 2022-01-01 RX ADMIN — Medication 1 DROP(S): at 18:29

## 2022-01-01 RX ADMIN — Medication 50 MILLIGRAM(S): at 19:29

## 2022-01-01 RX ADMIN — ATORVASTATIN CALCIUM 20 MILLIGRAM(S): 80 TABLET, FILM COATED ORAL at 21:02

## 2022-01-01 RX ADMIN — Medication 100 MILLIGRAM(S): at 12:53

## 2022-01-01 RX ADMIN — Medication 3 MILLILITER(S): at 18:37

## 2022-01-01 RX ADMIN — Medication 10 MILLIGRAM(S): at 07:03

## 2022-01-01 RX ADMIN — Medication 1 DROP(S): at 05:24

## 2022-01-01 RX ADMIN — Medication 3 MILLILITER(S): at 23:15

## 2022-01-01 RX ADMIN — Medication 300 MILLIGRAM(S): at 23:27

## 2022-01-01 RX ADMIN — Medication 25 GRAM(S): at 00:11

## 2022-01-01 RX ADMIN — HYDROMORPHONE HYDROCHLORIDE 0.25 MILLIGRAM(S): 2 INJECTION INTRAMUSCULAR; INTRAVENOUS; SUBCUTANEOUS at 11:34

## 2022-01-01 RX ADMIN — Medication 0.5 MILLIGRAM(S): at 19:07

## 2022-01-01 RX ADMIN — Medication 300 MILLIGRAM(S): at 08:10

## 2022-01-01 RX ADMIN — LEVETIRACETAM 400 MILLIGRAM(S): 250 TABLET, FILM COATED ORAL at 18:29

## 2022-01-01 RX ADMIN — Medication 50 MILLIGRAM(S): at 18:51

## 2022-01-01 RX ADMIN — Medication 75 MICROGRAM(S): at 05:37

## 2022-01-01 RX ADMIN — Medication 20 MILLIGRAM(S): at 13:08

## 2022-01-01 RX ADMIN — Medication 1 DROP(S): at 21:09

## 2022-01-01 RX ADMIN — PANTOPRAZOLE SODIUM 40 MILLIGRAM(S): 20 TABLET, DELAYED RELEASE ORAL at 16:52

## 2022-01-01 RX ADMIN — LEVETIRACETAM 500 MILLIGRAM(S): 250 TABLET, FILM COATED ORAL at 05:11

## 2022-01-01 RX ADMIN — Medication 1 DROP(S): at 14:58

## 2022-01-01 RX ADMIN — Medication 1 DROP(S): at 21:45

## 2022-01-01 RX ADMIN — HUMAN INSULIN 7 UNIT(S): 100 INJECTION, SUSPENSION SUBCUTANEOUS at 00:22

## 2022-01-01 RX ADMIN — Medication 4 MILLILITER(S): at 23:13

## 2022-01-01 RX ADMIN — HEPARIN SODIUM 5000 UNIT(S): 5000 INJECTION INTRAVENOUS; SUBCUTANEOUS at 05:32

## 2022-01-01 RX ADMIN — Medication 100 MILLIGRAM(S): at 14:54

## 2022-01-01 RX ADMIN — ERTAPENEM SODIUM 120 MILLIGRAM(S): 1 INJECTION, POWDER, LYOPHILIZED, FOR SOLUTION INTRAMUSCULAR; INTRAVENOUS at 14:07

## 2022-01-01 RX ADMIN — Medication 30 MILLIGRAM(S): at 06:42

## 2022-01-01 RX ADMIN — Medication 3 MILLILITER(S): at 05:46

## 2022-01-01 RX ADMIN — Medication 3 MILLILITER(S): at 18:07

## 2022-01-01 RX ADMIN — Medication 3 MILLILITER(S): at 17:20

## 2022-01-01 RX ADMIN — Medication 3 MILLILITER(S): at 23:30

## 2022-01-01 RX ADMIN — Medication 100 MILLIGRAM(S): at 12:02

## 2022-01-01 RX ADMIN — Medication 300 MILLIGRAM(S): at 17:56

## 2022-01-01 RX ADMIN — Medication 650 MILLIGRAM(S): at 06:12

## 2022-01-01 RX ADMIN — Medication 30 MILLIGRAM(S): at 12:42

## 2022-01-01 RX ADMIN — Medication 3 MILLILITER(S): at 00:04

## 2022-01-01 RX ADMIN — Medication 3 MILLIGRAM(S): at 21:48

## 2022-01-01 RX ADMIN — Medication 75 MICROGRAM(S): at 06:38

## 2022-01-01 RX ADMIN — Medication 30 MILLIGRAM(S): at 00:27

## 2022-01-01 RX ADMIN — Medication 300 MILLIGRAM(S): at 14:10

## 2022-01-01 RX ADMIN — SODIUM CHLORIDE 4 MILLILITER(S): 9 INJECTION INTRAMUSCULAR; INTRAVENOUS; SUBCUTANEOUS at 00:46

## 2022-01-01 RX ADMIN — Medication 20 MILLIGRAM(S): at 06:33

## 2022-01-01 RX ADMIN — ANASTROZOLE 1 MILLIGRAM(S): 1 TABLET ORAL at 11:49

## 2022-01-01 RX ADMIN — Medication 3 MILLILITER(S): at 12:03

## 2022-01-01 RX ADMIN — Medication 1: at 17:01

## 2022-01-01 RX ADMIN — Medication 20 MILLIGRAM(S): at 07:05

## 2022-01-01 RX ADMIN — Medication 100 MILLIGRAM(S): at 11:38

## 2022-01-01 RX ADMIN — ATORVASTATIN CALCIUM 20 MILLIGRAM(S): 80 TABLET, FILM COATED ORAL at 21:42

## 2022-01-01 RX ADMIN — Medication 2: at 11:37

## 2022-01-01 RX ADMIN — HEPARIN SODIUM 5000 UNIT(S): 5000 INJECTION INTRAVENOUS; SUBCUTANEOUS at 13:20

## 2022-01-01 RX ADMIN — HUMAN INSULIN 6 UNIT(S): 100 INJECTION, SUSPENSION SUBCUTANEOUS at 23:24

## 2022-01-01 RX ADMIN — SODIUM CHLORIDE 4 MILLILITER(S): 9 INJECTION INTRAMUSCULAR; INTRAVENOUS; SUBCUTANEOUS at 11:44

## 2022-01-01 RX ADMIN — ATORVASTATIN CALCIUM 20 MILLIGRAM(S): 80 TABLET, FILM COATED ORAL at 21:54

## 2022-01-01 RX ADMIN — SODIUM CHLORIDE 1000 MILLILITER(S): 9 INJECTION INTRAMUSCULAR; INTRAVENOUS; SUBCUTANEOUS at 20:10

## 2022-01-01 RX ADMIN — Medication 100 MILLIGRAM(S): at 12:16

## 2022-01-01 RX ADMIN — Medication 650 MILLIGRAM(S): at 23:42

## 2022-01-01 RX ADMIN — Medication 3 MILLILITER(S): at 06:12

## 2022-01-01 RX ADMIN — Medication 100 MILLIGRAM(S): at 12:24

## 2022-01-01 RX ADMIN — Medication 3 MILLILITER(S): at 00:54

## 2022-01-01 RX ADMIN — Medication 3 MILLILITER(S): at 05:45

## 2022-01-01 RX ADMIN — TRAMADOL HYDROCHLORIDE 50 MILLIGRAM(S): 50 TABLET ORAL at 18:21

## 2022-01-01 RX ADMIN — LEVETIRACETAM 500 MILLIGRAM(S): 250 TABLET, FILM COATED ORAL at 17:36

## 2022-01-01 RX ADMIN — SENNA PLUS 2 TABLET(S): 8.6 TABLET ORAL at 21:17

## 2022-01-01 RX ADMIN — Medication 75 MICROGRAM(S): at 05:27

## 2022-01-01 RX ADMIN — Medication 650 MILLIGRAM(S): at 05:36

## 2022-01-01 RX ADMIN — ALBUTEROL 2 PUFF(S): 90 AEROSOL, METERED ORAL at 23:10

## 2022-01-01 RX ADMIN — ATORVASTATIN CALCIUM 20 MILLIGRAM(S): 80 TABLET, FILM COATED ORAL at 22:41

## 2022-01-01 RX ADMIN — LEVETIRACETAM 750 MILLIGRAM(S): 250 TABLET, FILM COATED ORAL at 05:16

## 2022-01-01 RX ADMIN — Medication 650 MILLIGRAM(S): at 23:30

## 2022-01-01 RX ADMIN — Medication 5 MILLIGRAM(S): at 22:16

## 2022-01-01 RX ADMIN — Medication 0.25 MILLIGRAM(S): at 10:20

## 2022-01-01 RX ADMIN — Medication 0.5 MILLIGRAM(S): at 18:29

## 2022-01-01 RX ADMIN — SODIUM CHLORIDE 4 MILLILITER(S): 9 INJECTION INTRAMUSCULAR; INTRAVENOUS; SUBCUTANEOUS at 06:14

## 2022-01-01 RX ADMIN — Medication 300 MILLIGRAM(S): at 17:38

## 2022-01-01 RX ADMIN — Medication 1 DROP(S): at 06:29

## 2022-01-01 RX ADMIN — Medication 1 DROP(S): at 22:06

## 2022-01-01 RX ADMIN — Medication 20 MILLIGRAM(S): at 07:03

## 2022-01-01 RX ADMIN — SODIUM CHLORIDE 4 MILLILITER(S): 9 INJECTION INTRAMUSCULAR; INTRAVENOUS; SUBCUTANEOUS at 05:30

## 2022-01-01 RX ADMIN — Medication 100 MILLIGRAM(S): at 13:41

## 2022-01-01 RX ADMIN — LEVETIRACETAM 750 MILLIGRAM(S): 250 TABLET, FILM COATED ORAL at 08:11

## 2022-01-01 RX ADMIN — Medication 3 MILLILITER(S): at 05:32

## 2022-01-01 RX ADMIN — Medication 650 MILLIGRAM(S): at 00:37

## 2022-01-01 RX ADMIN — Medication 650 MILLIGRAM(S): at 19:10

## 2022-01-01 RX ADMIN — LEVETIRACETAM 750 MILLIGRAM(S): 250 TABLET, FILM COATED ORAL at 17:01

## 2022-01-01 RX ADMIN — HUMAN INSULIN 9 UNIT(S): 100 INJECTION, SUSPENSION SUBCUTANEOUS at 05:26

## 2022-01-01 RX ADMIN — HEPARIN SODIUM 5000 UNIT(S): 5000 INJECTION INTRAVENOUS; SUBCUTANEOUS at 05:12

## 2022-01-01 RX ADMIN — Medication 30 MILLIGRAM(S): at 05:18

## 2022-01-01 RX ADMIN — LEVETIRACETAM 750 MILLIGRAM(S): 250 TABLET, FILM COATED ORAL at 07:09

## 2022-01-01 RX ADMIN — Medication 25 MILLIGRAM(S): at 09:18

## 2022-01-01 RX ADMIN — Medication 100 MILLIGRAM(S): at 11:44

## 2022-01-01 RX ADMIN — Medication 3 MILLILITER(S): at 12:18

## 2022-01-01 RX ADMIN — Medication 1 DROP(S): at 22:05

## 2022-01-01 RX ADMIN — PREGABALIN 1000 MICROGRAM(S): 225 CAPSULE ORAL at 12:26

## 2022-01-01 RX ADMIN — CHLORHEXIDINE GLUCONATE 1 APPLICATION(S): 213 SOLUTION TOPICAL at 07:51

## 2022-01-01 RX ADMIN — ESCITALOPRAM OXALATE 10 MILLIGRAM(S): 10 TABLET, FILM COATED ORAL at 11:54

## 2022-01-01 RX ADMIN — Medication 300 MILLIGRAM(S): at 11:46

## 2022-01-01 RX ADMIN — Medication 2: at 12:38

## 2022-01-01 RX ADMIN — Medication 1: at 12:04

## 2022-01-01 RX ADMIN — ERTAPENEM SODIUM 120 MILLIGRAM(S): 1 INJECTION, POWDER, LYOPHILIZED, FOR SOLUTION INTRAMUSCULAR; INTRAVENOUS at 13:38

## 2022-01-01 RX ADMIN — Medication 25 MILLIGRAM(S): at 23:24

## 2022-01-01 RX ADMIN — Medication 3 MILLILITER(S): at 05:42

## 2022-01-01 RX ADMIN — ALBUTEROL 2 PUFF(S): 90 AEROSOL, METERED ORAL at 12:37

## 2022-01-01 RX ADMIN — SODIUM CHLORIDE 4 MILLILITER(S): 9 INJECTION INTRAMUSCULAR; INTRAVENOUS; SUBCUTANEOUS at 17:45

## 2022-01-01 RX ADMIN — Medication 650 MILLIGRAM(S): at 23:00

## 2022-01-01 RX ADMIN — Medication 0.25 MILLIGRAM(S): at 12:39

## 2022-01-01 RX ADMIN — PREGABALIN 1000 MICROGRAM(S): 225 CAPSULE ORAL at 12:24

## 2022-01-01 RX ADMIN — SODIUM CHLORIDE 4 MILLILITER(S): 9 INJECTION INTRAMUSCULAR; INTRAVENOUS; SUBCUTANEOUS at 11:53

## 2022-01-01 RX ADMIN — Medication 3 MILLILITER(S): at 11:45

## 2022-01-01 RX ADMIN — Medication 400 MILLIGRAM(S): at 00:51

## 2022-01-01 RX ADMIN — LEVETIRACETAM 750 MILLIGRAM(S): 250 TABLET, FILM COATED ORAL at 05:47

## 2022-01-01 RX ADMIN — Medication 650 MILLIGRAM(S): at 22:45

## 2022-01-01 RX ADMIN — Medication 5: at 12:22

## 2022-01-01 RX ADMIN — Medication 25 MILLIGRAM(S): at 11:06

## 2022-01-01 RX ADMIN — MORPHINE SULFATE 4 MILLIGRAM(S): 50 CAPSULE, EXTENDED RELEASE ORAL at 01:14

## 2022-01-01 RX ADMIN — Medication 25 MILLIGRAM(S): at 05:45

## 2022-01-01 RX ADMIN — LEVETIRACETAM 750 MILLIGRAM(S): 250 TABLET, FILM COATED ORAL at 22:05

## 2022-01-01 RX ADMIN — Medication 50 MILLIGRAM(S): at 00:17

## 2022-01-01 RX ADMIN — Medication 2: at 23:43

## 2022-01-01 RX ADMIN — Medication 45 MILLIGRAM(S): at 18:08

## 2022-01-01 RX ADMIN — Medication 4: at 12:23

## 2022-01-01 RX ADMIN — LEVETIRACETAM 750 MILLIGRAM(S): 250 TABLET, FILM COATED ORAL at 05:30

## 2022-01-01 RX ADMIN — SODIUM CHLORIDE 4 MILLILITER(S): 9 INJECTION INTRAMUSCULAR; INTRAVENOUS; SUBCUTANEOUS at 18:21

## 2022-01-01 RX ADMIN — Medication 3 MILLILITER(S): at 21:39

## 2022-01-01 RX ADMIN — Medication 3 MILLILITER(S): at 07:36

## 2022-01-01 RX ADMIN — Medication 5 MILLIGRAM(S): at 05:23

## 2022-01-01 RX ADMIN — HEPARIN SODIUM 5000 UNIT(S): 5000 INJECTION INTRAVENOUS; SUBCUTANEOUS at 21:30

## 2022-01-01 RX ADMIN — Medication 20 MILLIGRAM(S): at 11:29

## 2022-01-01 RX ADMIN — ERTAPENEM SODIUM 120 MILLIGRAM(S): 1 INJECTION, POWDER, LYOPHILIZED, FOR SOLUTION INTRAMUSCULAR; INTRAVENOUS at 14:46

## 2022-01-01 RX ADMIN — Medication 3 MILLILITER(S): at 11:46

## 2022-01-01 RX ADMIN — PREGABALIN 1000 MICROGRAM(S): 225 CAPSULE ORAL at 12:10

## 2022-01-01 RX ADMIN — Medication 1 DROP(S): at 23:04

## 2022-01-01 RX ADMIN — Medication 3 MILLILITER(S): at 18:06

## 2022-01-01 RX ADMIN — Medication 20 MILLIGRAM(S): at 06:19

## 2022-01-01 RX ADMIN — TRAMADOL HYDROCHLORIDE 50 MILLIGRAM(S): 50 TABLET ORAL at 05:22

## 2022-01-01 RX ADMIN — TRAMADOL HYDROCHLORIDE 50 MILLIGRAM(S): 50 TABLET ORAL at 18:00

## 2022-01-01 RX ADMIN — Medication 100 MILLIGRAM(S): at 12:43

## 2022-01-01 RX ADMIN — CEFTRIAXONE 100 MILLIGRAM(S): 500 INJECTION, POWDER, FOR SOLUTION INTRAMUSCULAR; INTRAVENOUS at 12:26

## 2022-01-01 RX ADMIN — Medication 5 MILLIGRAM(S): at 15:52

## 2022-01-01 RX ADMIN — Medication 100 MILLIGRAM(S): at 14:14

## 2022-01-01 RX ADMIN — LEVETIRACETAM 750 MILLIGRAM(S): 250 TABLET, FILM COATED ORAL at 23:04

## 2022-01-01 RX ADMIN — Medication 650 MILLIGRAM(S): at 12:24

## 2022-01-01 RX ADMIN — ATORVASTATIN CALCIUM 20 MILLIGRAM(S): 80 TABLET, FILM COATED ORAL at 22:08

## 2022-01-01 RX ADMIN — Medication 3 MILLIGRAM(S): at 22:47

## 2022-01-01 RX ADMIN — HEPARIN SODIUM 5000 UNIT(S): 5000 INJECTION INTRAVENOUS; SUBCUTANEOUS at 05:22

## 2022-01-01 RX ADMIN — LEVETIRACETAM 750 MILLIGRAM(S): 250 TABLET, FILM COATED ORAL at 07:00

## 2022-01-01 RX ADMIN — Medication 45 MILLIGRAM(S): at 06:59

## 2022-01-01 RX ADMIN — Medication 2: at 17:18

## 2022-01-01 RX ADMIN — LEVETIRACETAM 750 MILLIGRAM(S): 250 TABLET, FILM COATED ORAL at 18:19

## 2022-01-01 RX ADMIN — Medication 650 MILLIGRAM(S): at 06:27

## 2022-01-01 RX ADMIN — TRAMADOL HYDROCHLORIDE 50 MILLIGRAM(S): 50 TABLET ORAL at 21:02

## 2022-01-01 RX ADMIN — Medication 100 MILLIGRAM(S): at 13:10

## 2022-01-01 RX ADMIN — LEVETIRACETAM 750 MILLIGRAM(S): 250 TABLET, FILM COATED ORAL at 21:18

## 2022-01-01 RX ADMIN — Medication 650 MILLIGRAM(S): at 21:17

## 2022-01-01 RX ADMIN — TRAMADOL HYDROCHLORIDE 50 MILLIGRAM(S): 50 TABLET ORAL at 09:12

## 2022-01-01 RX ADMIN — Medication 4 MILLILITER(S): at 18:21

## 2022-01-01 RX ADMIN — Medication 20 MILLIGRAM(S): at 18:38

## 2022-01-01 RX ADMIN — Medication 20 MILLIGRAM(S): at 12:37

## 2022-01-01 RX ADMIN — HUMAN INSULIN 9 UNIT(S): 100 INJECTION, SUSPENSION SUBCUTANEOUS at 00:55

## 2022-01-01 RX ADMIN — Medication 3 MILLILITER(S): at 06:03

## 2022-01-01 RX ADMIN — ANASTROZOLE 1 MILLIGRAM(S): 1 TABLET ORAL at 12:16

## 2022-01-01 RX ADMIN — Medication 5 MILLIGRAM(S): at 11:59

## 2022-01-01 RX ADMIN — Medication 300 MILLIGRAM(S): at 17:33

## 2022-01-01 RX ADMIN — Medication 300 MILLIGRAM(S): at 05:28

## 2022-01-01 RX ADMIN — Medication 75 MICROGRAM(S): at 05:35

## 2022-01-01 RX ADMIN — Medication 3 MILLILITER(S): at 06:30

## 2022-01-01 RX ADMIN — Medication 20 MILLIGRAM(S): at 17:41

## 2022-01-01 RX ADMIN — TRAMADOL HYDROCHLORIDE 50 MILLIGRAM(S): 50 TABLET ORAL at 12:15

## 2022-01-01 RX ADMIN — Medication 0.5 MILLIGRAM(S): at 18:24

## 2022-01-01 RX ADMIN — Medication 1000 MILLIGRAM(S): at 18:14

## 2022-01-01 RX ADMIN — LEVETIRACETAM 750 MILLIGRAM(S): 250 TABLET, FILM COATED ORAL at 10:07

## 2022-01-01 RX ADMIN — SODIUM CHLORIDE 4 MILLILITER(S): 9 INJECTION INTRAMUSCULAR; INTRAVENOUS; SUBCUTANEOUS at 20:22

## 2022-01-01 RX ADMIN — HUMAN INSULIN 7 UNIT(S): 100 INJECTION, SUSPENSION SUBCUTANEOUS at 18:11

## 2022-01-01 RX ADMIN — HEPARIN SODIUM 5000 UNIT(S): 5000 INJECTION INTRAVENOUS; SUBCUTANEOUS at 06:24

## 2022-01-01 RX ADMIN — ATORVASTATIN CALCIUM 20 MILLIGRAM(S): 80 TABLET, FILM COATED ORAL at 22:03

## 2022-01-01 RX ADMIN — Medication 250 MILLIGRAM(S): at 14:09

## 2022-01-01 RX ADMIN — ONDANSETRON 4 MILLIGRAM(S): 8 TABLET, FILM COATED ORAL at 09:12

## 2022-01-01 RX ADMIN — Medication 0.5 MILLIGRAM(S): at 18:36

## 2022-01-01 RX ADMIN — RIVAROXABAN 20 MILLIGRAM(S): KIT at 17:24

## 2022-01-01 RX ADMIN — Medication 4 MILLILITER(S): at 05:33

## 2022-01-01 RX ADMIN — ANASTROZOLE 1 MILLIGRAM(S): 1 TABLET ORAL at 12:30

## 2022-01-01 RX ADMIN — LEVETIRACETAM 750 MILLIGRAM(S): 250 TABLET, FILM COATED ORAL at 06:42

## 2022-01-01 RX ADMIN — LEVETIRACETAM 400 MILLIGRAM(S): 250 TABLET, FILM COATED ORAL at 07:18

## 2022-01-01 RX ADMIN — CHLORHEXIDINE GLUCONATE 1 APPLICATION(S): 213 SOLUTION TOPICAL at 05:40

## 2022-01-01 RX ADMIN — Medication 4 MILLILITER(S): at 13:16

## 2022-01-01 RX ADMIN — Medication 3 MILLIGRAM(S): at 22:59

## 2022-01-01 RX ADMIN — SODIUM CHLORIDE 4 MILLILITER(S): 9 INJECTION INTRAMUSCULAR; INTRAVENOUS; SUBCUTANEOUS at 02:23

## 2022-01-01 RX ADMIN — Medication 3 MILLILITER(S): at 12:19

## 2022-01-01 RX ADMIN — Medication 100 MILLIGRAM(S): at 13:19

## 2022-01-01 RX ADMIN — Medication 650 MILLIGRAM(S): at 06:00

## 2022-01-01 RX ADMIN — LEVETIRACETAM 500 MILLIGRAM(S): 250 TABLET, FILM COATED ORAL at 05:51

## 2022-01-01 RX ADMIN — Medication 300 MILLIGRAM(S): at 06:15

## 2022-01-01 RX ADMIN — Medication 800 MILLIGRAM(S): at 00:29

## 2022-01-01 RX ADMIN — ALTEPLASE 2 MILLIGRAM(S): KIT at 17:05

## 2022-01-01 RX ADMIN — NYSTATIN CREAM 1 APPLICATION(S): 100000 CREAM TOPICAL at 05:12

## 2022-01-01 RX ADMIN — Medication 650 MILLIGRAM(S): at 09:20

## 2022-01-01 RX ADMIN — Medication 0.25 MILLIGRAM(S): at 21:25

## 2022-01-01 RX ADMIN — Medication 3 MILLILITER(S): at 14:05

## 2022-01-01 RX ADMIN — Medication 75 MICROGRAM(S): at 06:34

## 2022-01-01 RX ADMIN — Medication 3 MILLILITER(S): at 13:18

## 2022-01-01 RX ADMIN — Medication 300 MILLIGRAM(S): at 17:36

## 2022-01-01 RX ADMIN — Medication 650 MILLIGRAM(S): at 17:58

## 2022-01-01 RX ADMIN — Medication 650 MILLIGRAM(S): at 05:48

## 2022-01-01 RX ADMIN — Medication 300 MILLIGRAM(S): at 17:23

## 2022-01-01 RX ADMIN — REMDESIVIR 500 MILLIGRAM(S): 5 INJECTION INTRAVENOUS at 10:38

## 2022-01-01 RX ADMIN — ATORVASTATIN CALCIUM 20 MILLIGRAM(S): 80 TABLET, FILM COATED ORAL at 21:05

## 2022-01-01 RX ADMIN — Medication 20 MILLIGRAM(S): at 13:52

## 2022-01-01 RX ADMIN — HEPARIN SODIUM 5000 UNIT(S): 5000 INJECTION INTRAVENOUS; SUBCUTANEOUS at 21:53

## 2022-01-01 RX ADMIN — Medication 300 MILLIGRAM(S): at 11:28

## 2022-01-01 RX ADMIN — PIPERACILLIN AND TAZOBACTAM 25 GRAM(S): 4; .5 INJECTION, POWDER, LYOPHILIZED, FOR SOLUTION INTRAVENOUS at 01:44

## 2022-01-01 RX ADMIN — Medication 25 MILLIGRAM(S): at 23:09

## 2022-01-01 RX ADMIN — PREGABALIN 1000 MICROGRAM(S): 225 CAPSULE ORAL at 12:08

## 2022-01-01 RX ADMIN — ATORVASTATIN CALCIUM 20 MILLIGRAM(S): 80 TABLET, FILM COATED ORAL at 21:22

## 2022-01-01 RX ADMIN — HEPARIN SODIUM 5000 UNIT(S): 5000 INJECTION INTRAVENOUS; SUBCUTANEOUS at 21:27

## 2022-01-01 RX ADMIN — Medication 25 MILLIGRAM(S): at 06:17

## 2022-01-01 RX ADMIN — Medication 650 MILLIGRAM(S): at 21:10

## 2022-01-01 RX ADMIN — Medication 650 MILLIGRAM(S): at 14:25

## 2022-01-01 RX ADMIN — NYSTATIN CREAM 1 APPLICATION(S): 100000 CREAM TOPICAL at 06:23

## 2022-01-01 RX ADMIN — ATORVASTATIN CALCIUM 20 MILLIGRAM(S): 80 TABLET, FILM COATED ORAL at 21:47

## 2022-01-01 RX ADMIN — ALBUTEROL 2 PUFF(S): 90 AEROSOL, METERED ORAL at 07:37

## 2022-01-01 RX ADMIN — Medication 4 MILLILITER(S): at 22:02

## 2022-01-01 RX ADMIN — Medication 800 MILLIGRAM(S): at 17:51

## 2022-01-01 RX ADMIN — LEVETIRACETAM 750 MILLIGRAM(S): 250 TABLET, FILM COATED ORAL at 17:34

## 2022-01-01 RX ADMIN — LEVETIRACETAM 750 MILLIGRAM(S): 250 TABLET, FILM COATED ORAL at 18:00

## 2022-01-01 RX ADMIN — Medication 4 MILLILITER(S): at 23:04

## 2022-01-01 RX ADMIN — Medication 650 MILLIGRAM(S): at 14:03

## 2022-01-01 RX ADMIN — Medication 0.25 MILLIGRAM(S): at 14:06

## 2022-01-01 RX ADMIN — Medication 5 MILLIGRAM(S): at 21:09

## 2022-01-01 RX ADMIN — REMDESIVIR 500 MILLIGRAM(S): 5 INJECTION INTRAVENOUS at 10:45

## 2022-01-01 RX ADMIN — SODIUM CHLORIDE 50 MILLILITER(S): 9 INJECTION, SOLUTION INTRAVENOUS at 11:48

## 2022-01-01 RX ADMIN — LEVETIRACETAM 750 MILLIGRAM(S): 250 TABLET, FILM COATED ORAL at 18:42

## 2022-01-01 RX ADMIN — HUMAN INSULIN 9 UNIT(S): 100 INJECTION, SUSPENSION SUBCUTANEOUS at 05:12

## 2022-01-01 RX ADMIN — HEPARIN SODIUM 5000 UNIT(S): 5000 INJECTION INTRAVENOUS; SUBCUTANEOUS at 13:16

## 2022-01-01 RX ADMIN — PIPERACILLIN AND TAZOBACTAM 25 GRAM(S): 4; .5 INJECTION, POWDER, LYOPHILIZED, FOR SOLUTION INTRAVENOUS at 09:32

## 2022-01-01 RX ADMIN — Medication 75 MICROGRAM(S): at 05:51

## 2022-01-01 RX ADMIN — PREGABALIN 1000 MICROGRAM(S): 225 CAPSULE ORAL at 11:29

## 2022-01-01 RX ADMIN — ATORVASTATIN CALCIUM 20 MILLIGRAM(S): 80 TABLET, FILM COATED ORAL at 21:48

## 2022-01-01 RX ADMIN — HEPARIN SODIUM 5000 UNIT(S): 5000 INJECTION INTRAVENOUS; SUBCUTANEOUS at 14:54

## 2022-01-01 RX ADMIN — Medication 650 MILLIGRAM(S): at 06:16

## 2022-01-01 RX ADMIN — Medication 300 MILLIGRAM(S): at 00:56

## 2022-01-01 RX ADMIN — Medication 300 MILLIGRAM(S): at 06:41

## 2022-01-01 RX ADMIN — LEVETIRACETAM 750 MILLIGRAM(S): 250 TABLET, FILM COATED ORAL at 10:17

## 2022-01-01 RX ADMIN — Medication 3 MILLILITER(S): at 06:37

## 2022-01-01 RX ADMIN — ESCITALOPRAM OXALATE 10 MILLIGRAM(S): 10 TABLET, FILM COATED ORAL at 13:18

## 2022-01-01 RX ADMIN — PIPERACILLIN AND TAZOBACTAM 200 GRAM(S): 4; .5 INJECTION, POWDER, LYOPHILIZED, FOR SOLUTION INTRAVENOUS at 21:14

## 2022-01-01 RX ADMIN — Medication 1 DROP(S): at 18:51

## 2022-01-01 RX ADMIN — Medication 3 MILLILITER(S): at 13:15

## 2022-01-01 RX ADMIN — LEVETIRACETAM 750 MILLIGRAM(S): 250 TABLET, FILM COATED ORAL at 21:17

## 2022-01-01 RX ADMIN — Medication 30 MILLIGRAM(S): at 13:18

## 2022-01-01 RX ADMIN — Medication 300 MILLIGRAM(S): at 13:07

## 2022-01-01 RX ADMIN — Medication 650 MILLIGRAM(S): at 18:00

## 2022-01-01 RX ADMIN — HEPARIN SODIUM 5000 UNIT(S): 5000 INJECTION INTRAVENOUS; SUBCUTANEOUS at 14:49

## 2022-01-01 RX ADMIN — HUMAN INSULIN 7 UNIT(S): 100 INJECTION, SUSPENSION SUBCUTANEOUS at 13:03

## 2022-01-01 RX ADMIN — ANASTROZOLE 1 MILLIGRAM(S): 1 TABLET ORAL at 13:04

## 2022-01-01 RX ADMIN — Medication 300 MILLIGRAM(S): at 12:24

## 2022-01-01 RX ADMIN — HUMAN INSULIN 6 UNIT(S): 100 INJECTION, SUSPENSION SUBCUTANEOUS at 00:45

## 2022-01-01 RX ADMIN — Medication 300 MILLIGRAM(S): at 05:11

## 2022-01-01 RX ADMIN — Medication 650 MILLIGRAM(S): at 06:09

## 2022-01-01 RX ADMIN — Medication 100 MILLIGRAM(S): at 12:56

## 2022-01-01 RX ADMIN — Medication 650 MILLIGRAM(S): at 15:19

## 2022-01-01 RX ADMIN — Medication 1 PACKET(S): at 11:54

## 2022-01-01 RX ADMIN — Medication 250 MILLIGRAM(S): at 23:10

## 2022-01-01 RX ADMIN — Medication 75 MICROGRAM(S): at 06:06

## 2022-01-01 RX ADMIN — Medication 20 MILLIGRAM(S): at 07:09

## 2022-01-01 RX ADMIN — Medication 30 MILLIGRAM(S): at 00:22

## 2022-01-01 RX ADMIN — Medication 75 MICROGRAM(S): at 06:23

## 2022-01-01 RX ADMIN — ATORVASTATIN CALCIUM 20 MILLIGRAM(S): 80 TABLET, FILM COATED ORAL at 23:25

## 2022-01-01 RX ADMIN — ESCITALOPRAM OXALATE 10 MILLIGRAM(S): 10 TABLET, FILM COATED ORAL at 12:24

## 2022-01-01 RX ADMIN — Medication 3 MILLILITER(S): at 23:19

## 2022-01-01 RX ADMIN — Medication 3 MILLILITER(S): at 23:25

## 2022-01-01 RX ADMIN — Medication 30 MILLIGRAM(S): at 13:04

## 2022-01-01 RX ADMIN — Medication 300 MILLIGRAM(S): at 05:07

## 2022-01-01 RX ADMIN — Medication 3 MILLILITER(S): at 00:51

## 2022-01-01 RX ADMIN — ESCITALOPRAM OXALATE 10 MILLIGRAM(S): 10 TABLET, FILM COATED ORAL at 13:31

## 2022-01-01 RX ADMIN — Medication 30 MILLIGRAM(S): at 05:52

## 2022-01-01 RX ADMIN — SODIUM CHLORIDE 4 MILLILITER(S): 9 INJECTION INTRAMUSCULAR; INTRAVENOUS; SUBCUTANEOUS at 13:35

## 2022-01-01 RX ADMIN — Medication 30 MILLIGRAM(S): at 14:51

## 2022-01-01 RX ADMIN — AMLODIPINE BESYLATE 10 MILLIGRAM(S): 2.5 TABLET ORAL at 17:55

## 2022-01-01 RX ADMIN — Medication 250 MILLIGRAM(S): at 12:55

## 2022-01-01 RX ADMIN — Medication 25 MILLIGRAM(S): at 20:30

## 2022-01-01 RX ADMIN — Medication 30 MILLIGRAM(S): at 21:25

## 2022-01-01 RX ADMIN — Medication 2: at 11:23

## 2022-01-01 RX ADMIN — Medication 100 MILLIGRAM(S): at 12:14

## 2022-01-01 RX ADMIN — Medication 400 MILLIGRAM(S): at 10:00

## 2022-01-01 RX ADMIN — Medication 100 MILLIGRAM(S): at 13:43

## 2022-01-01 RX ADMIN — Medication 250 MILLIGRAM(S): at 13:26

## 2022-01-01 RX ADMIN — Medication 100 MILLIGRAM(S): at 11:54

## 2022-01-01 RX ADMIN — Medication 0.5 MILLIGRAM(S): at 18:43

## 2022-01-01 RX ADMIN — SODIUM CHLORIDE 4 MILLILITER(S): 9 INJECTION INTRAMUSCULAR; INTRAVENOUS; SUBCUTANEOUS at 23:52

## 2022-01-01 RX ADMIN — Medication 650 MILLIGRAM(S): at 12:25

## 2022-01-01 RX ADMIN — Medication 3 MILLILITER(S): at 12:07

## 2022-01-01 RX ADMIN — Medication 125 MICROGRAM(S): at 05:01

## 2022-01-01 RX ADMIN — ENOXAPARIN SODIUM 40 MILLIGRAM(S): 100 INJECTION SUBCUTANEOUS at 18:27

## 2022-01-01 RX ADMIN — Medication 1: at 13:28

## 2022-01-01 RX ADMIN — Medication 5 MILLIGRAM(S): at 21:47

## 2022-01-01 RX ADMIN — Medication 20 MILLIGRAM(S): at 23:13

## 2022-01-01 RX ADMIN — Medication 75 MICROGRAM(S): at 06:15

## 2022-01-01 RX ADMIN — Medication 50 MILLIGRAM(S): at 09:15

## 2022-01-01 RX ADMIN — ANASTROZOLE 1 MILLIGRAM(S): 1 TABLET ORAL at 11:14

## 2022-01-01 RX ADMIN — Medication 20 MILLIGRAM(S): at 22:59

## 2022-01-01 RX ADMIN — Medication 3: at 00:11

## 2022-01-01 RX ADMIN — LEVETIRACETAM 750 MILLIGRAM(S): 250 TABLET, FILM COATED ORAL at 17:24

## 2022-01-01 RX ADMIN — Medication 650 MILLIGRAM(S): at 17:10

## 2022-01-01 RX ADMIN — AMLODIPINE BESYLATE 10 MILLIGRAM(S): 2.5 TABLET ORAL at 17:47

## 2022-01-01 RX ADMIN — TRAMADOL HYDROCHLORIDE 50 MILLIGRAM(S): 50 TABLET ORAL at 08:09

## 2022-01-01 RX ADMIN — SODIUM CHLORIDE 4 MILLILITER(S): 9 INJECTION INTRAMUSCULAR; INTRAVENOUS; SUBCUTANEOUS at 08:09

## 2022-01-01 RX ADMIN — HUMAN INSULIN 7 UNIT(S): 100 INJECTION, SUSPENSION SUBCUTANEOUS at 00:10

## 2022-01-01 RX ADMIN — Medication 300 MILLIGRAM(S): at 06:16

## 2022-01-01 RX ADMIN — Medication 650 MILLIGRAM(S): at 00:48

## 2022-01-01 RX ADMIN — Medication 3 MILLIGRAM(S): at 21:50

## 2022-01-01 RX ADMIN — Medication 30 MILLIGRAM(S): at 13:43

## 2022-01-01 RX ADMIN — Medication 1: at 12:02

## 2022-01-01 RX ADMIN — Medication 0.5 MILLIGRAM(S): at 18:03

## 2022-01-01 RX ADMIN — Medication 3 MILLILITER(S): at 20:42

## 2022-01-01 RX ADMIN — ESCITALOPRAM OXALATE 10 MILLIGRAM(S): 10 TABLET, FILM COATED ORAL at 12:28

## 2022-01-01 RX ADMIN — SENNA PLUS 2 TABLET(S): 8.6 TABLET ORAL at 21:49

## 2022-01-01 RX ADMIN — Medication 3 MILLILITER(S): at 17:26

## 2022-01-01 RX ADMIN — Medication 5 MILLIGRAM(S): at 22:48

## 2022-01-01 RX ADMIN — Medication 3 MILLILITER(S): at 02:13

## 2022-01-01 RX ADMIN — Medication 3 MILLILITER(S): at 05:44

## 2022-01-01 RX ADMIN — Medication 4 MILLILITER(S): at 06:46

## 2022-01-01 RX ADMIN — SENNA PLUS 2 TABLET(S): 8.6 TABLET ORAL at 23:05

## 2022-01-01 RX ADMIN — Medication 3 MILLILITER(S): at 05:04

## 2022-01-01 RX ADMIN — Medication 5 MILLIGRAM(S): at 22:11

## 2022-01-01 RX ADMIN — LEVETIRACETAM 750 MILLIGRAM(S): 250 TABLET, FILM COATED ORAL at 05:06

## 2022-01-01 RX ADMIN — LEVETIRACETAM 750 MILLIGRAM(S): 250 TABLET, FILM COATED ORAL at 06:22

## 2022-01-01 RX ADMIN — Medication 300 MILLIGRAM(S): at 00:37

## 2022-01-01 RX ADMIN — Medication 3 MILLILITER(S): at 17:02

## 2022-01-01 RX ADMIN — Medication 2: at 12:40

## 2022-01-01 RX ADMIN — HEPARIN SODIUM 5000 UNIT(S): 5000 INJECTION INTRAVENOUS; SUBCUTANEOUS at 05:07

## 2022-01-01 RX ADMIN — LEVETIRACETAM 750 MILLIGRAM(S): 250 TABLET, FILM COATED ORAL at 05:23

## 2022-01-01 RX ADMIN — Medication 30 MILLIGRAM(S): at 18:14

## 2022-01-01 RX ADMIN — TRAMADOL HYDROCHLORIDE 50 MILLIGRAM(S): 50 TABLET ORAL at 09:52

## 2022-01-01 RX ADMIN — Medication 25 MILLIGRAM(S): at 17:38

## 2022-01-01 RX ADMIN — Medication 800 MILLIGRAM(S): at 17:33

## 2022-01-01 RX ADMIN — Medication 3 MILLILITER(S): at 01:03

## 2022-01-01 RX ADMIN — ATORVASTATIN CALCIUM 20 MILLIGRAM(S): 80 TABLET, FILM COATED ORAL at 22:17

## 2022-01-01 RX ADMIN — MORPHINE SULFATE 4 MILLIGRAM(S): 50 CAPSULE, EXTENDED RELEASE ORAL at 00:44

## 2022-01-01 RX ADMIN — Medication 2 PUFF(S): at 23:11

## 2022-01-01 RX ADMIN — Medication 3 MILLILITER(S): at 18:59

## 2022-01-01 RX ADMIN — CHLORHEXIDINE GLUCONATE 1 APPLICATION(S): 213 SOLUTION TOPICAL at 09:25

## 2022-01-01 RX ADMIN — HUMAN INSULIN 10 UNIT(S): 100 INJECTION, SUSPENSION SUBCUTANEOUS at 06:50

## 2022-01-01 RX ADMIN — Medication 400 MILLIGRAM(S): at 04:45

## 2022-01-01 RX ADMIN — Medication 650 MILLIGRAM(S): at 20:33

## 2022-01-01 RX ADMIN — Medication 400 MILLIGRAM(S): at 02:45

## 2022-01-01 RX ADMIN — Medication 125 MICROGRAM(S): at 05:08

## 2022-01-01 RX ADMIN — CHLORHEXIDINE GLUCONATE 1 APPLICATION(S): 213 SOLUTION TOPICAL at 05:15

## 2022-01-01 RX ADMIN — Medication 30 MILLIGRAM(S): at 11:28

## 2022-01-01 RX ADMIN — Medication 250 MILLIGRAM(S): at 22:39

## 2022-01-01 RX ADMIN — LEVETIRACETAM 750 MILLIGRAM(S): 250 TABLET, FILM COATED ORAL at 21:05

## 2022-01-01 RX ADMIN — Medication 0.5 MILLIGRAM(S): at 05:18

## 2022-01-01 RX ADMIN — Medication 3 MILLILITER(S): at 00:10

## 2022-01-01 RX ADMIN — CHLORHEXIDINE GLUCONATE 1 APPLICATION(S): 213 SOLUTION TOPICAL at 05:33

## 2022-01-01 RX ADMIN — Medication 650 MILLIGRAM(S): at 18:55

## 2022-01-01 RX ADMIN — Medication 4 MILLILITER(S): at 11:49

## 2022-01-01 RX ADMIN — HEPARIN SODIUM 5000 UNIT(S): 5000 INJECTION INTRAVENOUS; SUBCUTANEOUS at 05:13

## 2022-01-01 RX ADMIN — LEVETIRACETAM 750 MILLIGRAM(S): 250 TABLET, FILM COATED ORAL at 20:26

## 2022-01-01 RX ADMIN — Medication 2: at 06:18

## 2022-01-01 RX ADMIN — Medication 50 MILLIGRAM(S): at 11:12

## 2022-01-01 RX ADMIN — HEPARIN SODIUM 5000 UNIT(S): 5000 INJECTION INTRAVENOUS; SUBCUTANEOUS at 06:09

## 2022-01-01 RX ADMIN — ANASTROZOLE 1 MILLIGRAM(S): 1 TABLET ORAL at 12:08

## 2022-01-01 RX ADMIN — TRAMADOL HYDROCHLORIDE 50 MILLIGRAM(S): 50 TABLET ORAL at 21:26

## 2022-01-01 RX ADMIN — HEPARIN SODIUM 5000 UNIT(S): 5000 INJECTION INTRAVENOUS; SUBCUTANEOUS at 21:46

## 2022-01-01 RX ADMIN — HEPARIN SODIUM 5000 UNIT(S): 5000 INJECTION INTRAVENOUS; SUBCUTANEOUS at 14:09

## 2022-01-01 RX ADMIN — Medication 5 MILLIGRAM(S): at 22:21

## 2022-01-01 RX ADMIN — Medication 4 MILLILITER(S): at 21:18

## 2022-01-01 RX ADMIN — ATORVASTATIN CALCIUM 20 MILLIGRAM(S): 80 TABLET, FILM COATED ORAL at 23:28

## 2022-01-01 RX ADMIN — Medication 650 MILLIGRAM(S): at 06:28

## 2022-01-01 RX ADMIN — ATORVASTATIN CALCIUM 20 MILLIGRAM(S): 80 TABLET, FILM COATED ORAL at 22:58

## 2022-01-01 RX ADMIN — CHLORHEXIDINE GLUCONATE 1 APPLICATION(S): 213 SOLUTION TOPICAL at 06:06

## 2022-01-01 RX ADMIN — TRAMADOL HYDROCHLORIDE 50 MILLIGRAM(S): 50 TABLET ORAL at 20:32

## 2022-01-01 RX ADMIN — Medication 3 MILLILITER(S): at 00:03

## 2022-01-01 RX ADMIN — Medication 250 MILLIGRAM(S): at 21:12

## 2022-01-01 RX ADMIN — NYSTATIN CREAM 1 APPLICATION(S): 100000 CREAM TOPICAL at 17:17

## 2022-01-01 RX ADMIN — ANASTROZOLE 1 MILLIGRAM(S): 1 TABLET ORAL at 14:49

## 2022-01-01 RX ADMIN — Medication 250 MICROGRAM(S): at 18:23

## 2022-01-01 RX ADMIN — Medication 30 MILLIGRAM(S): at 21:45

## 2022-01-01 RX ADMIN — HEPARIN SODIUM 5000 UNIT(S): 5000 INJECTION INTRAVENOUS; SUBCUTANEOUS at 05:29

## 2022-01-01 RX ADMIN — PREGABALIN 1000 MICROGRAM(S): 225 CAPSULE ORAL at 12:01

## 2022-01-01 RX ADMIN — TRAMADOL HYDROCHLORIDE 50 MILLIGRAM(S): 50 TABLET ORAL at 22:33

## 2022-01-01 RX ADMIN — Medication 30 MILLIGRAM(S): at 18:19

## 2022-01-01 RX ADMIN — HEPARIN SODIUM 5000 UNIT(S): 5000 INJECTION INTRAVENOUS; SUBCUTANEOUS at 05:23

## 2022-01-01 RX ADMIN — PREGABALIN 1000 MICROGRAM(S): 225 CAPSULE ORAL at 12:06

## 2022-01-01 RX ADMIN — SODIUM CHLORIDE 4 MILLILITER(S): 9 INJECTION INTRAMUSCULAR; INTRAVENOUS; SUBCUTANEOUS at 17:53

## 2022-01-01 RX ADMIN — Medication 75 MICROGRAM(S): at 06:20

## 2022-01-01 RX ADMIN — Medication 1: at 05:30

## 2022-01-01 RX ADMIN — Medication 500 MILLIGRAM(S): at 11:44

## 2022-01-01 RX ADMIN — PREGABALIN 1000 MICROGRAM(S): 225 CAPSULE ORAL at 11:33

## 2022-01-01 RX ADMIN — Medication 1000 MILLIGRAM(S): at 10:24

## 2022-01-01 RX ADMIN — SODIUM CHLORIDE 4 MILLILITER(S): 9 INJECTION INTRAMUSCULAR; INTRAVENOUS; SUBCUTANEOUS at 05:25

## 2022-01-01 RX ADMIN — Medication 1000 MILLIGRAM(S): at 05:15

## 2022-01-01 RX ADMIN — Medication 30 MILLIGRAM(S): at 01:33

## 2022-01-01 RX ADMIN — SODIUM CHLORIDE 4 MILLILITER(S): 9 INJECTION INTRAMUSCULAR; INTRAVENOUS; SUBCUTANEOUS at 05:22

## 2022-01-01 RX ADMIN — Medication 650 MILLIGRAM(S): at 23:22

## 2022-01-01 RX ADMIN — Medication 300 MILLIGRAM(S): at 23:13

## 2022-01-01 RX ADMIN — Medication 0.25 MILLIGRAM(S): at 23:34

## 2022-01-01 RX ADMIN — AMLODIPINE BESYLATE 10 MILLIGRAM(S): 2.5 TABLET ORAL at 17:51

## 2022-01-01 RX ADMIN — HEPARIN SODIUM 5000 UNIT(S): 5000 INJECTION INTRAVENOUS; SUBCUTANEOUS at 13:53

## 2022-01-01 RX ADMIN — Medication 100 MILLIGRAM(S): at 11:47

## 2022-01-01 RX ADMIN — Medication 3 MILLILITER(S): at 10:56

## 2022-01-01 RX ADMIN — Medication 4 MILLILITER(S): at 21:26

## 2022-01-01 RX ADMIN — Medication 300 MILLIGRAM(S): at 00:31

## 2022-01-01 RX ADMIN — ENOXAPARIN SODIUM 40 MILLIGRAM(S): 100 INJECTION SUBCUTANEOUS at 12:56

## 2022-01-01 RX ADMIN — Medication 650 MILLIGRAM(S): at 14:52

## 2022-01-01 RX ADMIN — ENOXAPARIN SODIUM 40 MILLIGRAM(S): 100 INJECTION SUBCUTANEOUS at 21:49

## 2022-01-01 RX ADMIN — SODIUM CHLORIDE 4 MILLILITER(S): 9 INJECTION INTRAMUSCULAR; INTRAVENOUS; SUBCUTANEOUS at 05:28

## 2022-01-01 RX ADMIN — ENOXAPARIN SODIUM 40 MILLIGRAM(S): 100 INJECTION SUBCUTANEOUS at 17:57

## 2022-01-01 RX ADMIN — Medication 5 MILLIGRAM(S): at 12:15

## 2022-01-01 RX ADMIN — RIVAROXABAN 20 MILLIGRAM(S): KIT at 18:13

## 2022-01-01 RX ADMIN — Medication 4 MILLILITER(S): at 21:30

## 2022-01-01 RX ADMIN — Medication 30 MILLIGRAM(S): at 21:24

## 2022-01-01 RX ADMIN — Medication 0.5 MILLIGRAM(S): at 17:50

## 2022-01-01 RX ADMIN — LEVETIRACETAM 750 MILLIGRAM(S): 250 TABLET, FILM COATED ORAL at 20:22

## 2022-01-01 RX ADMIN — Medication 3 MILLIGRAM(S): at 21:22

## 2022-01-01 RX ADMIN — ERTAPENEM SODIUM 120 MILLIGRAM(S): 1 INJECTION, POWDER, LYOPHILIZED, FOR SOLUTION INTRAMUSCULAR; INTRAVENOUS at 11:25

## 2022-01-01 RX ADMIN — LEVETIRACETAM 750 MILLIGRAM(S): 250 TABLET, FILM COATED ORAL at 20:29

## 2022-01-01 RX ADMIN — Medication 300 MILLIGRAM(S): at 21:27

## 2022-01-01 RX ADMIN — LEVETIRACETAM 750 MILLIGRAM(S): 250 TABLET, FILM COATED ORAL at 22:15

## 2022-01-01 RX ADMIN — TRAMADOL HYDROCHLORIDE 50 MILLIGRAM(S): 50 TABLET ORAL at 08:43

## 2022-01-01 RX ADMIN — HUMAN INSULIN 7 UNIT(S): 100 INJECTION, SUSPENSION SUBCUTANEOUS at 00:42

## 2022-01-01 RX ADMIN — Medication 30 MILLIGRAM(S): at 07:03

## 2022-01-01 RX ADMIN — Medication 3 MILLILITER(S): at 17:21

## 2022-01-01 RX ADMIN — Medication 20 MILLIGRAM(S): at 12:43

## 2022-01-01 RX ADMIN — ATORVASTATIN CALCIUM 20 MILLIGRAM(S): 80 TABLET, FILM COATED ORAL at 00:10

## 2022-01-01 RX ADMIN — Medication 650 MILLIGRAM(S): at 18:17

## 2022-01-01 RX ADMIN — Medication 650 MILLIGRAM(S): at 15:12

## 2022-01-01 RX ADMIN — Medication 300 MILLIGRAM(S): at 17:34

## 2022-01-01 RX ADMIN — Medication 100 MILLIGRAM(S): at 11:35

## 2022-01-01 RX ADMIN — Medication 5 MILLIGRAM(S): at 14:43

## 2022-01-01 RX ADMIN — Medication 250 MILLIGRAM(S): at 15:21

## 2022-01-01 RX ADMIN — Medication 25 MILLIGRAM(S): at 08:05

## 2022-01-01 RX ADMIN — LEVETIRACETAM 750 MILLIGRAM(S): 250 TABLET, FILM COATED ORAL at 06:03

## 2022-01-01 RX ADMIN — ALBUTEROL 2 PUFF(S): 90 AEROSOL, METERED ORAL at 05:55

## 2022-01-01 RX ADMIN — LEVETIRACETAM 500 MILLIGRAM(S): 250 TABLET, FILM COATED ORAL at 17:47

## 2022-01-01 RX ADMIN — Medication 30 MILLIGRAM(S): at 07:09

## 2022-01-01 RX ADMIN — Medication 300 MILLIGRAM(S): at 23:50

## 2022-01-01 RX ADMIN — Medication 3 MILLILITER(S): at 10:24

## 2022-01-01 RX ADMIN — Medication 30 MILLIGRAM(S): at 00:48

## 2022-01-01 RX ADMIN — HEPARIN SODIUM 5000 UNIT(S): 5000 INJECTION INTRAVENOUS; SUBCUTANEOUS at 06:23

## 2022-01-01 RX ADMIN — Medication 3 MILLILITER(S): at 06:33

## 2022-01-01 RX ADMIN — SODIUM CHLORIDE 4 MILLILITER(S): 9 INJECTION INTRAMUSCULAR; INTRAVENOUS; SUBCUTANEOUS at 20:47

## 2022-01-01 RX ADMIN — Medication 3 MILLILITER(S): at 21:10

## 2022-01-01 RX ADMIN — Medication 400 MILLIGRAM(S): at 11:06

## 2022-01-01 RX ADMIN — NYSTATIN CREAM 1 APPLICATION(S): 100000 CREAM TOPICAL at 17:29

## 2022-01-01 RX ADMIN — Medication 0.25 MILLIGRAM(S): at 03:26

## 2022-01-01 RX ADMIN — Medication 3 MILLILITER(S): at 05:28

## 2022-01-01 RX ADMIN — Medication 300 MILLIGRAM(S): at 23:37

## 2022-01-01 RX ADMIN — Medication 1: at 16:54

## 2022-01-01 RX ADMIN — ATORVASTATIN CALCIUM 20 MILLIGRAM(S): 80 TABLET, FILM COATED ORAL at 21:46

## 2022-01-01 RX ADMIN — LEVETIRACETAM 750 MILLIGRAM(S): 250 TABLET, FILM COATED ORAL at 18:03

## 2022-01-01 RX ADMIN — AMLODIPINE BESYLATE 10 MILLIGRAM(S): 2.5 TABLET ORAL at 17:53

## 2022-01-01 RX ADMIN — Medication 1 DROP(S): at 18:23

## 2022-01-01 RX ADMIN — Medication 3 MILLILITER(S): at 07:13

## 2022-01-01 RX ADMIN — Medication 3 MILLILITER(S): at 00:28

## 2022-01-01 RX ADMIN — Medication 650 MILLIGRAM(S): at 12:06

## 2022-01-01 RX ADMIN — Medication 1: at 00:50

## 2022-01-01 RX ADMIN — Medication 30 MILLIGRAM(S): at 14:12

## 2022-01-01 RX ADMIN — Medication 250 MILLIGRAM(S): at 23:20

## 2022-01-01 RX ADMIN — Medication 650 MILLIGRAM(S): at 11:00

## 2022-01-01 RX ADMIN — Medication 3 MILLIGRAM(S): at 21:53

## 2022-01-01 RX ADMIN — Medication 650 MILLIGRAM(S): at 18:14

## 2022-01-01 RX ADMIN — ENOXAPARIN SODIUM 40 MILLIGRAM(S): 100 INJECTION SUBCUTANEOUS at 12:49

## 2022-01-01 RX ADMIN — LEVETIRACETAM 750 MILLIGRAM(S): 250 TABLET, FILM COATED ORAL at 17:18

## 2022-01-01 RX ADMIN — ANASTROZOLE 1 MILLIGRAM(S): 1 TABLET ORAL at 11:05

## 2022-01-01 RX ADMIN — HEPARIN SODIUM 5000 UNIT(S): 5000 INJECTION INTRAVENOUS; SUBCUTANEOUS at 22:03

## 2022-01-01 RX ADMIN — LEVETIRACETAM 750 MILLIGRAM(S): 250 TABLET, FILM COATED ORAL at 10:26

## 2022-01-01 RX ADMIN — Medication 1: at 06:11

## 2022-01-01 RX ADMIN — ATORVASTATIN CALCIUM 20 MILLIGRAM(S): 80 TABLET, FILM COATED ORAL at 23:06

## 2022-01-01 RX ADMIN — Medication 100 MILLIGRAM(S): at 13:11

## 2022-01-01 RX ADMIN — NYSTATIN CREAM 1 APPLICATION(S): 100000 CREAM TOPICAL at 18:57

## 2022-01-01 RX ADMIN — Medication 3 MILLILITER(S): at 13:04

## 2022-01-01 RX ADMIN — Medication 650 MILLIGRAM(S): at 13:00

## 2022-01-01 RX ADMIN — ERTAPENEM SODIUM 120 MILLIGRAM(S): 1 INJECTION, POWDER, LYOPHILIZED, FOR SOLUTION INTRAMUSCULAR; INTRAVENOUS at 14:02

## 2022-01-01 RX ADMIN — Medication 4 MILLILITER(S): at 21:46

## 2022-01-01 RX ADMIN — ATORVASTATIN CALCIUM 20 MILLIGRAM(S): 80 TABLET, FILM COATED ORAL at 22:13

## 2022-01-01 RX ADMIN — ATORVASTATIN CALCIUM 20 MILLIGRAM(S): 80 TABLET, FILM COATED ORAL at 22:23

## 2022-01-01 RX ADMIN — Medication 650 MILLIGRAM(S): at 05:18

## 2022-01-01 RX ADMIN — Medication 3 MILLILITER(S): at 23:29

## 2022-01-01 RX ADMIN — SODIUM CHLORIDE 4 MILLILITER(S): 9 INJECTION INTRAMUSCULAR; INTRAVENOUS; SUBCUTANEOUS at 05:16

## 2022-01-01 RX ADMIN — Medication 30 MILLIGRAM(S): at 13:00

## 2022-01-01 RX ADMIN — Medication 3 MILLIGRAM(S): at 21:18

## 2022-01-01 RX ADMIN — Medication 100 MILLIGRAM(S): at 12:28

## 2022-01-01 RX ADMIN — Medication 300 MILLIGRAM(S): at 23:16

## 2022-01-01 RX ADMIN — Medication 1: at 23:25

## 2022-01-01 RX ADMIN — CHLORHEXIDINE GLUCONATE 1 APPLICATION(S): 213 SOLUTION TOPICAL at 05:08

## 2022-01-01 RX ADMIN — Medication 3 MILLIGRAM(S): at 22:25

## 2022-01-01 RX ADMIN — Medication 5 MILLIGRAM(S): at 22:58

## 2022-01-01 RX ADMIN — Medication 1000 MILLIGRAM(S): at 03:48

## 2022-01-01 RX ADMIN — SODIUM CHLORIDE 4 MILLILITER(S): 9 INJECTION INTRAMUSCULAR; INTRAVENOUS; SUBCUTANEOUS at 17:25

## 2022-01-01 RX ADMIN — Medication 3 MILLILITER(S): at 23:00

## 2022-01-01 RX ADMIN — Medication 300 MILLIGRAM(S): at 17:25

## 2022-01-01 RX ADMIN — NYSTATIN CREAM 1 APPLICATION(S): 100000 CREAM TOPICAL at 18:12

## 2022-01-01 RX ADMIN — SENNA PLUS 2 TABLET(S): 8.6 TABLET ORAL at 22:22

## 2022-01-01 RX ADMIN — Medication 300 MILLIGRAM(S): at 01:45

## 2022-01-01 RX ADMIN — Medication 300 MILLIGRAM(S): at 13:09

## 2022-01-01 RX ADMIN — HUMAN INSULIN 7 UNIT(S): 100 INJECTION, SUSPENSION SUBCUTANEOUS at 18:12

## 2022-01-01 RX ADMIN — SODIUM CHLORIDE 4 MILLILITER(S): 9 INJECTION INTRAMUSCULAR; INTRAVENOUS; SUBCUTANEOUS at 23:04

## 2022-01-01 RX ADMIN — Medication 4: at 12:51

## 2022-01-01 RX ADMIN — Medication 1 DROP(S): at 06:13

## 2022-01-01 RX ADMIN — Medication 650 MILLIGRAM(S): at 22:14

## 2022-01-01 RX ADMIN — SODIUM CHLORIDE 80 MILLILITER(S): 9 INJECTION, SOLUTION INTRAVENOUS at 11:54

## 2022-01-01 RX ADMIN — SODIUM CHLORIDE 4 MILLILITER(S): 9 INJECTION INTRAMUSCULAR; INTRAVENOUS; SUBCUTANEOUS at 18:12

## 2022-01-01 RX ADMIN — Medication 300 MILLIGRAM(S): at 18:09

## 2022-01-01 RX ADMIN — Medication 1 DROP(S): at 18:03

## 2022-01-01 RX ADMIN — Medication 650 MILLIGRAM(S): at 15:51

## 2022-01-01 RX ADMIN — Medication 300 MILLIGRAM(S): at 23:39

## 2022-01-01 RX ADMIN — Medication 5 MILLIGRAM(S): at 22:40

## 2022-01-01 RX ADMIN — Medication 0.5 MILLIGRAM(S): at 06:26

## 2022-01-01 RX ADMIN — Medication 300 MILLIGRAM(S): at 06:03

## 2022-01-01 RX ADMIN — Medication 300 MILLIGRAM(S): at 12:18

## 2022-01-01 RX ADMIN — HEPARIN SODIUM 5000 UNIT(S): 5000 INJECTION INTRAVENOUS; SUBCUTANEOUS at 23:08

## 2022-01-01 RX ADMIN — HEPARIN SODIUM 5000 UNIT(S): 5000 INJECTION INTRAVENOUS; SUBCUTANEOUS at 21:11

## 2022-01-01 RX ADMIN — Medication 100 MILLIGRAM(S): at 11:08

## 2022-01-01 RX ADMIN — Medication 30 MILLIGRAM(S): at 22:10

## 2022-01-01 RX ADMIN — Medication 3 MILLILITER(S): at 05:27

## 2022-01-01 RX ADMIN — CHLORHEXIDINE GLUCONATE 1 APPLICATION(S): 213 SOLUTION TOPICAL at 06:22

## 2022-01-01 RX ADMIN — ATORVASTATIN CALCIUM 20 MILLIGRAM(S): 80 TABLET, FILM COATED ORAL at 22:28

## 2022-01-01 RX ADMIN — Medication 3 MILLILITER(S): at 13:31

## 2022-01-01 RX ADMIN — RIVAROXABAN 10 MILLIGRAM(S): KIT at 13:44

## 2022-01-01 RX ADMIN — ESCITALOPRAM OXALATE 10 MILLIGRAM(S): 10 TABLET, FILM COATED ORAL at 11:06

## 2022-01-01 RX ADMIN — Medication 3 MILLILITER(S): at 06:22

## 2022-01-01 RX ADMIN — Medication 30 MILLIGRAM(S): at 21:22

## 2022-01-01 RX ADMIN — Medication 3 MILLILITER(S): at 16:56

## 2022-01-01 RX ADMIN — LEVETIRACETAM 750 MILLIGRAM(S): 250 TABLET, FILM COATED ORAL at 21:11

## 2022-01-01 RX ADMIN — Medication 4 MILLILITER(S): at 05:53

## 2022-01-01 RX ADMIN — SODIUM CHLORIDE 4 MILLILITER(S): 9 INJECTION INTRAMUSCULAR; INTRAVENOUS; SUBCUTANEOUS at 17:29

## 2022-01-01 RX ADMIN — Medication 30 MILLIGRAM(S): at 05:46

## 2022-01-01 RX ADMIN — Medication 2 PUFF(S): at 12:37

## 2022-01-01 RX ADMIN — ATORVASTATIN CALCIUM 20 MILLIGRAM(S): 80 TABLET, FILM COATED ORAL at 21:51

## 2022-01-01 RX ADMIN — Medication 1: at 05:58

## 2022-01-01 RX ADMIN — Medication 4 MILLILITER(S): at 13:35

## 2022-01-01 RX ADMIN — SODIUM CHLORIDE 4 MILLILITER(S): 9 INJECTION INTRAMUSCULAR; INTRAVENOUS; SUBCUTANEOUS at 06:39

## 2022-01-01 RX ADMIN — Medication 3 MILLILITER(S): at 12:05

## 2022-01-01 RX ADMIN — ENOXAPARIN SODIUM 40 MILLIGRAM(S): 100 INJECTION SUBCUTANEOUS at 17:45

## 2022-01-01 RX ADMIN — SODIUM CHLORIDE 4 MILLILITER(S): 9 INJECTION INTRAMUSCULAR; INTRAVENOUS; SUBCUTANEOUS at 07:00

## 2022-01-01 RX ADMIN — Medication 125 MICROGRAM(S): at 05:29

## 2022-01-01 RX ADMIN — Medication 3 MILLILITER(S): at 23:48

## 2022-01-01 RX ADMIN — Medication 3 MILLILITER(S): at 00:45

## 2022-01-01 RX ADMIN — HEPARIN SODIUM 5000 UNIT(S): 5000 INJECTION INTRAVENOUS; SUBCUTANEOUS at 05:42

## 2022-01-01 RX ADMIN — Medication 3 MILLILITER(S): at 11:14

## 2022-01-01 RX ADMIN — Medication 25 MILLIGRAM(S): at 09:52

## 2022-01-01 RX ADMIN — Medication 1 DROP(S): at 22:01

## 2022-01-01 RX ADMIN — SODIUM CHLORIDE 4 MILLILITER(S): 9 INJECTION INTRAMUSCULAR; INTRAVENOUS; SUBCUTANEOUS at 06:12

## 2022-01-01 RX ADMIN — Medication 650 MILLIGRAM(S): at 23:17

## 2022-01-01 RX ADMIN — Medication 30 MILLIGRAM(S): at 06:46

## 2022-01-01 RX ADMIN — Medication 3 MILLILITER(S): at 12:01

## 2022-01-01 RX ADMIN — LEVETIRACETAM 750 MILLIGRAM(S): 250 TABLET, FILM COATED ORAL at 17:41

## 2022-01-01 RX ADMIN — Medication 3 MILLILITER(S): at 18:10

## 2022-01-01 RX ADMIN — CHLORHEXIDINE GLUCONATE 1 APPLICATION(S): 213 SOLUTION TOPICAL at 08:08

## 2022-01-01 RX ADMIN — Medication 3 MILLILITER(S): at 13:37

## 2022-01-01 RX ADMIN — CHLORHEXIDINE GLUCONATE 1 APPLICATION(S): 213 SOLUTION TOPICAL at 11:21

## 2022-01-01 RX ADMIN — Medication 3 MILLIGRAM(S): at 22:33

## 2022-01-01 RX ADMIN — Medication 3 MILLILITER(S): at 12:22

## 2022-01-01 RX ADMIN — Medication 300 MILLIGRAM(S): at 05:19

## 2022-01-01 RX ADMIN — Medication 20 MILLIGRAM(S): at 18:50

## 2022-01-01 RX ADMIN — LEVETIRACETAM 750 MILLIGRAM(S): 250 TABLET, FILM COATED ORAL at 17:47

## 2022-01-01 RX ADMIN — Medication 3 MILLILITER(S): at 00:27

## 2022-01-01 RX ADMIN — SODIUM CHLORIDE 4 MILLILITER(S): 9 INJECTION INTRAMUSCULAR; INTRAVENOUS; SUBCUTANEOUS at 05:05

## 2022-01-01 RX ADMIN — NYSTATIN CREAM 1 APPLICATION(S): 100000 CREAM TOPICAL at 05:08

## 2022-01-01 RX ADMIN — ATORVASTATIN CALCIUM 20 MILLIGRAM(S): 80 TABLET, FILM COATED ORAL at 21:27

## 2022-01-01 RX ADMIN — Medication 75 MICROGRAM(S): at 06:39

## 2022-01-01 RX ADMIN — TRAMADOL HYDROCHLORIDE 50 MILLIGRAM(S): 50 TABLET ORAL at 23:00

## 2022-01-01 RX ADMIN — ERTAPENEM SODIUM 120 MILLIGRAM(S): 1 INJECTION, POWDER, LYOPHILIZED, FOR SOLUTION INTRAMUSCULAR; INTRAVENOUS at 14:16

## 2022-01-01 RX ADMIN — CHLORHEXIDINE GLUCONATE 1 APPLICATION(S): 213 SOLUTION TOPICAL at 05:14

## 2022-01-01 RX ADMIN — Medication 100 MILLIGRAM(S): at 11:28

## 2022-01-01 RX ADMIN — ERTAPENEM SODIUM 120 MILLIGRAM(S): 1 INJECTION, POWDER, LYOPHILIZED, FOR SOLUTION INTRAMUSCULAR; INTRAVENOUS at 12:48

## 2022-01-01 RX ADMIN — Medication 3 MILLILITER(S): at 11:50

## 2022-01-01 RX ADMIN — LEVETIRACETAM 750 MILLIGRAM(S): 250 TABLET, FILM COATED ORAL at 21:19

## 2022-01-01 RX ADMIN — SODIUM CHLORIDE 4 MILLILITER(S): 9 INJECTION INTRAMUSCULAR; INTRAVENOUS; SUBCUTANEOUS at 18:09

## 2022-01-01 RX ADMIN — AMLODIPINE BESYLATE 10 MILLIGRAM(S): 2.5 TABLET ORAL at 19:31

## 2022-01-01 RX ADMIN — Medication 100 MILLIGRAM(S): at 12:26

## 2022-01-01 RX ADMIN — HEPARIN SODIUM 5000 UNIT(S): 5000 INJECTION INTRAVENOUS; SUBCUTANEOUS at 21:02

## 2022-01-01 RX ADMIN — Medication 75 MICROGRAM(S): at 05:24

## 2022-01-01 RX ADMIN — Medication 300 MILLIGRAM(S): at 06:18

## 2022-01-01 RX ADMIN — AMLODIPINE BESYLATE 10 MILLIGRAM(S): 2.5 TABLET ORAL at 17:37

## 2022-01-01 RX ADMIN — Medication 3 MILLILITER(S): at 11:18

## 2022-01-01 RX ADMIN — LEVETIRACETAM 750 MILLIGRAM(S): 250 TABLET, FILM COATED ORAL at 18:23

## 2022-01-01 RX ADMIN — Medication 300 MILLIGRAM(S): at 06:34

## 2022-01-01 RX ADMIN — Medication 3 MILLILITER(S): at 17:13

## 2022-01-01 RX ADMIN — LEVETIRACETAM 500 MILLIGRAM(S): 250 TABLET, FILM COATED ORAL at 07:47

## 2022-01-01 RX ADMIN — Medication 1 DROP(S): at 18:02

## 2022-01-01 RX ADMIN — Medication 650 MILLIGRAM(S): at 13:37

## 2022-01-01 RX ADMIN — Medication 25 MILLIGRAM(S): at 06:44

## 2022-01-01 RX ADMIN — TRAMADOL HYDROCHLORIDE 50 MILLIGRAM(S): 50 TABLET ORAL at 22:29

## 2022-01-01 RX ADMIN — Medication 30 MILLIGRAM(S): at 12:36

## 2022-01-01 RX ADMIN — Medication 1: at 12:50

## 2022-01-01 RX ADMIN — SODIUM CHLORIDE 4 MILLILITER(S): 9 INJECTION INTRAMUSCULAR; INTRAVENOUS; SUBCUTANEOUS at 12:58

## 2022-01-01 RX ADMIN — RIVAROXABAN 20 MILLIGRAM(S): KIT at 17:27

## 2022-01-01 RX ADMIN — NYSTATIN CREAM 1 APPLICATION(S): 100000 CREAM TOPICAL at 18:06

## 2022-01-01 RX ADMIN — Medication 650 MILLIGRAM(S): at 06:30

## 2022-01-01 RX ADMIN — Medication 75 MICROGRAM(S): at 05:08

## 2022-01-01 RX ADMIN — Medication 3 MILLILITER(S): at 19:35

## 2022-01-01 RX ADMIN — Medication 125 MICROGRAM(S): at 05:48

## 2022-01-01 RX ADMIN — CHLORHEXIDINE GLUCONATE 1 APPLICATION(S): 213 SOLUTION TOPICAL at 05:38

## 2022-01-01 RX ADMIN — Medication 100 MILLIGRAM(S): at 12:29

## 2022-01-01 RX ADMIN — Medication 30 MILLIGRAM(S): at 00:56

## 2022-01-01 RX ADMIN — HUMAN INSULIN 10 UNIT(S): 100 INJECTION, SUSPENSION SUBCUTANEOUS at 06:20

## 2022-01-01 RX ADMIN — Medication 20 MILLIGRAM(S): at 22:15

## 2022-01-01 RX ADMIN — Medication 0.25 MILLIGRAM(S): at 15:25

## 2022-01-01 RX ADMIN — TRAMADOL HYDROCHLORIDE 50 MILLIGRAM(S): 50 TABLET ORAL at 22:26

## 2022-01-01 RX ADMIN — LEVETIRACETAM 750 MILLIGRAM(S): 250 TABLET, FILM COATED ORAL at 05:04

## 2022-01-01 RX ADMIN — Medication 650 MILLIGRAM(S): at 17:40

## 2022-01-01 RX ADMIN — Medication 125 MICROGRAM(S): at 11:14

## 2022-01-01 RX ADMIN — Medication 650 MILLIGRAM(S): at 23:11

## 2022-01-01 RX ADMIN — PIPERACILLIN AND TAZOBACTAM 25 GRAM(S): 4; .5 INJECTION, POWDER, LYOPHILIZED, FOR SOLUTION INTRAVENOUS at 17:18

## 2022-01-01 RX ADMIN — ATORVASTATIN CALCIUM 20 MILLIGRAM(S): 80 TABLET, FILM COATED ORAL at 23:02

## 2022-01-01 RX ADMIN — Medication 3 MILLILITER(S): at 18:29

## 2022-01-01 RX ADMIN — CHLORHEXIDINE GLUCONATE 1 APPLICATION(S): 213 SOLUTION TOPICAL at 06:20

## 2022-01-01 RX ADMIN — LEVETIRACETAM 750 MILLIGRAM(S): 250 TABLET, FILM COATED ORAL at 07:10

## 2022-01-01 RX ADMIN — Medication 1 DROP(S): at 18:30

## 2022-01-01 RX ADMIN — Medication 400 MILLIGRAM(S): at 03:12

## 2022-01-01 RX ADMIN — Medication 3 MILLILITER(S): at 18:20

## 2022-01-01 RX ADMIN — Medication 100 MILLIGRAM(S): at 12:58

## 2022-01-01 RX ADMIN — ATORVASTATIN CALCIUM 20 MILLIGRAM(S): 80 TABLET, FILM COATED ORAL at 02:43

## 2022-01-01 RX ADMIN — SENNA PLUS 2 TABLET(S): 8.6 TABLET ORAL at 22:09

## 2022-01-01 RX ADMIN — Medication 300 MILLIGRAM(S): at 17:02

## 2022-01-01 RX ADMIN — PIPERACILLIN AND TAZOBACTAM 25 GRAM(S): 4; .5 INJECTION, POWDER, LYOPHILIZED, FOR SOLUTION INTRAVENOUS at 16:20

## 2022-01-01 RX ADMIN — ANASTROZOLE 1 MILLIGRAM(S): 1 TABLET ORAL at 12:37

## 2022-01-01 RX ADMIN — Medication 3 MILLILITER(S): at 06:39

## 2022-01-01 RX ADMIN — Medication 30 MILLILITER(S): at 01:52

## 2022-01-01 RX ADMIN — Medication 300 MILLIGRAM(S): at 00:04

## 2022-01-01 RX ADMIN — Medication 30 MILLIGRAM(S): at 05:01

## 2022-01-01 RX ADMIN — Medication 4 MILLILITER(S): at 06:33

## 2022-01-01 RX ADMIN — TRAMADOL HYDROCHLORIDE 50 MILLIGRAM(S): 50 TABLET ORAL at 11:59

## 2022-01-01 RX ADMIN — Medication 300 MILLIGRAM(S): at 18:15

## 2022-01-01 RX ADMIN — TRAMADOL HYDROCHLORIDE 50 MILLIGRAM(S): 50 TABLET ORAL at 05:39

## 2022-01-01 RX ADMIN — Medication 300 MILLIGRAM(S): at 12:06

## 2022-01-01 RX ADMIN — Medication 100 MILLIGRAM(S): at 11:23

## 2022-01-01 RX ADMIN — ANASTROZOLE 1 MILLIGRAM(S): 1 TABLET ORAL at 13:43

## 2022-01-01 RX ADMIN — SENNA PLUS 2 TABLET(S): 8.6 TABLET ORAL at 21:52

## 2022-01-01 RX ADMIN — Medication 3 MILLILITER(S): at 11:43

## 2022-01-01 NOTE — CHART NOTE - NSCHARTNOTEFT_GEN_A_CORE
MEDICINE NP     FRANKI MEHTA  86y Female  Patient is a 86y old  Female who presents with a chief complaint of fevers/sob (01 Jan 2022 12:56)       Call by RN patient PEG tube came out.  Patient seen at bedside, PEG tube very small, but was clean and replaced back into peg tube site  Tube feeding held. Informed attending of PEG tube coming out, Instructed to call IR. IR consult placed   Received call from IR consult team.   Recommend abdominal Xray with injection of gastrografin into PEG tube for correct placement.        Vital Signs Last 24 Hrs  T(C): 36.7 (01 Jan 2022 18:37), Max: 37.4 (01 Jan 2022 13:33)  T(F): 98 (01 Jan 2022 18:37), Max: 99.3 (01 Jan 2022 13:33)  HR: 98 (01 Jan 2022 18:37) (90 - 100)  BP: 100/81 (01 Jan 2022 18:37) (100/81 - 130/66)  BP(mean): --  RR: 20 (01 Jan 2022 18:37) (20 - 28)  SpO2: 97% (01 Jan 2022 18:37) (90% - 97%)      Abdominal Xray was done at bedside this evening, but unsure if tube is in the right position and therefore would   continue to hold Tube feeds until we can get a fluoroscopic tube check and/or changed by IR.   Peg tube is   not anchored down therefore would suggest a peg tube replacement       Di Mota, DNP-C  Medicine Department

## 2022-01-01 NOTE — PROGRESS NOTE ADULT - ASSESSMENT
86   year old female      h/o hemorrhagic CVA, has  PEG,  HTN, MI,      HLD, gout   right Ca breast. s/p mastectomy in 2019,  s/p  sentinel node  localization.  4/4,  were  negative         *  p/w SOB and  acute respiratory distress,  was  on   bipap  in er   with  elevated wbc of 17,000 on arrival,  from pna/  probable aspiration/ gram negative pna  cxr,? pl effusion, right  lung opacit   *   s/p cva, has  PEG,  npo/,  on  synthroid, Keppra  ,  *  HTN, on meds  per  card  prior  echo,  normal ef  *  h/o ca breast. /, s/p  R  mastectomy  *  bacteremia. / staph epi, meth R  ct  c/a/p, ?  pna, perforated  appendicitis,  ?  mets  to  acetabulum   surg/ IR  and  oncology eval dr pacheco    appreciate  excellent  care of  house  staff  per  surg, no  intervention  *   sepsis  on  arrival, is  resolving  from  perforated  appendix/ and  Staph epi  bacteremia which is  persistent,  hence  cannot  dismiss  it as  a contaminant    and  also, with  echo, vegetation on  aortic  valve/ endocarditis, ?  esdras,  may  not  change   rx  plan,  will need  extended  course  of  ab   / will defer to card    was on iv vanco, . Cefepime   and flagyl   rpt ct  noted,  RLL  phlegmon,  and  80  cc of  pus  drained by  IR .  c/s  with  ecoli, e coccus  and  sophia  pe r card , pt high risk for esdras  and given that . this will not alter rx. pt  will need   extended  course  of ab   elevated lfts , noted, are normal  picc  line  / mastectomy unilateral  on iv  vanco and ertapenem/ follow vanco level       rd< from: Xray Chest 1 View- PORTABLE-Urgent (12.19.21 @ 09:20) >  IMPRESSION:  Low lung volumes. New small left lower lung hazy opacity may represent   atelectasis versus pleural effusion. Redemonstration of right upper lung   perihilar opacity largely unchanged prior exam.  --- End of Report --  < end of copied text >

## 2022-01-01 NOTE — PROGRESS NOTE ADULT - SUBJECTIVE AND OBJECTIVE BOX
CARDIOLOGY     PROGRESS  NOTE   ________________________________________________    CHIEF COMPLAINT:Patient is a 86y old  Female who presents with a chief complaint of fevers/sob (01 Jan 2022 06:58)  no complain.  	  REVIEW OF SYSTEMS:  CONSTITUTIONAL: No fever, weight loss, or fatigue  EYES: No eye pain, visual disturbances, or discharge  ENT:  No difficulty hearing, tinnitus, vertigo; No sinus or throat pain  NECK: No pain or stiffness  RESPIRATORY: No cough, wheezing, chills or hemoptysis; No Shortness of Breath  CARDIOVASCULAR: No chest pain, palpitations, passing out, dizziness, or leg swelling  GASTROINTESTINAL: No abdominal or epigastric pain. No nausea, vomiting, or hematemesis; No diarrhea or constipation. No melena or hematochezia.  GENITOURINARY: No dysuria, frequency, hematuria, or incontinence  NEUROLOGICAL: No headaches, memory loss, loss of strength, numbness, or tremors  SKIN: No itching, burning, rashes, or lesions   LYMPH Nodes: No enlarged glands  ENDOCRINE: No heat or cold intolerance; No hair loss  MUSCULOSKELETAL: No joint pain or swelling; No muscle, back, or extremity pain  PSYCHIATRIC: No depression, anxiety, mood swings, or difficulty sleeping  HEME/LYMPH: No easy bruising, or bleeding gums  ALLERGY AND IMMUNOLOGIC: No hives or eczema	    [ ] All others negative	  [ ] Unable to obtain    PHYSICAL EXAM:  T(C): 36.5 (01-01-22 @ 09:30), Max: 37.2 (12-31-21 @ 14:03)  HR: 100 (01-01-22 @ 09:30) (90 - 100)  BP: 130/66 (01-01-22 @ 09:30) (106/65 - 130/66)  RR: 20 (01-01-22 @ 10:00) (19 - 20)  SpO2: 94% (01-01-22 @ 10:00) (90% - 94%)  Wt(kg): --  I&O's Summary    31 Dec 2021 07:01  -  01 Jan 2022 07:00  --------------------------------------------------------  IN: 1930 mL / OUT: 700 mL / NET: 1230 mL        Appearance: Normal	  HEENT:   Normal oral mucosa, PERRL, EOMI	  Lymphatic: No lymphadenopathy  Cardiovascular: Normal S1 S2, No JVD, + murmurs, No edema  Respiratory: rhonchi  Psychiatry: A & O x 3, Mood & affect appropriate  Gastrointestinal:  Soft, Non-tender, + BS, peg  Skin: No rashes, No ecchymoses, No cyanosis	  Neurologic: Non-focal  Extremities: Normal range of motion, No clubbing, cyanosis or edema  Vascular: Peripheral pulses palpable 2+ bilaterally    MEDICATIONS  (STANDING):  allopurinol 100 milliGRAM(s) Oral daily  amLODIPine   Tablet 5 milliGRAM(s) Oral daily  artificial  tears Solution 1 Drop(s) Both EYES two times a day  atorvastatin 20 milliGRAM(s) Oral at bedtime  chlorhexidine 4% Liquid 1 Application(s) Topical <User Schedule>  dextrose 40% Gel 15 Gram(s) Oral once  dextrose 5%. 1000 milliLiter(s) (50 mL/Hr) IV Continuous <Continuous>  dextrose 5%. 1000 milliLiter(s) (100 mL/Hr) IV Continuous <Continuous>  dextrose 50% Injectable 25 Gram(s) IV Push once  dextrose 50% Injectable 12.5 Gram(s) IV Push once  dextrose 50% Injectable 25 Gram(s) IV Push once  ertapenem  IVPB 1000 milliGRAM(s) IV Intermittent every 24 hours  glucagon  Injectable 1 milliGRAM(s) IntraMuscular once  heparin   Injectable 5000 Unit(s) SubCutaneous every 8 hours  insulin lispro (ADMELOG) corrective regimen sliding scale   SubCutaneous every 6 hours  levETIRAcetam  Solution 750 milliGRAM(s) Oral two times a day  levothyroxine 75 MICROGram(s) Oral daily  metoprolol tartrate 50 milliGRAM(s) Oral two times a day  nystatin Cream 1 Application(s) Topical two times a day  vancomycin  IVPB 1000 milliGRAM(s) IV Intermittent every 24 hours      TELEMETRY: 	    ECG:  	  RADIOLOGY:  OTHER: 	  	  LABS:	 	    CARDIAC MARKERS:                                9.1    8.08  )-----------( 236      ( 01 Jan 2022 05:18 )             29.4     12-31    133<L>  |  98  |  19  ----------------------------<  204<H>  4.8   |  24  |  0.65    Ca    8.8      31 Dec 2021 10:30      proBNP: Serum Pro-Brain Natriuretic Peptide: 375 pg/mL (12-19 @ 08:45)    Lipid Profile:   HgA1c:   TSH:         Assessment and plan  ---------------------------  85 yo F from [facility], CVA with residual left weakness (~3 years ago), PEG for dysphagia, hypothyroid, COPD (on no home Oxygen), HTN, gout, p/w fever, Pt has respiratory distress, wheezing, concerning for respiratory infection, otherwise no other symptoms is reported on the chart. EMS gave solumedrol 125 through peripheral, small iv line.  pt is well known to me with hx of htn, ashd, s/p MI, cva with increasing sob on bipap in er.  can not get any hx from the pt.  sob ?respiratory failure ?sec to pneumoniae  ?pleural effusion, check ct chest noted  continue bp meds  dvt prophylaxis  abx  will consider to possible repeat echo  duoneb  dvt prophylaxis  ct scan/ surgery/ ID noted  continue amlodipine and Metoprolol for now  tachycardia sec to underlying condition, continue to observe  oob to chair as tolerated  IR, continue abx/ surgery noted  incentive spirometry  replete K  abx as per ID  repeat blood cultures, negative  increase metoprolol to 50 mg bid sec to increase bp observe closely  esdras , high risk sec to sleep apnea and her overall condition, agree with empiric therapy  will repeat  TTE in 6 weeeks  decrease Norvasc to 5 mg daily, bp is better controlled

## 2022-01-01 NOTE — PROGRESS NOTE ADULT - SUBJECTIVE AND OBJECTIVE BOX
afebrile    REVIEW OF SYSTEMS:  GEN: no fever,    no chills  RESP: no SOB,   no cough  CVS: no chest pain,   no palpitations  GI: no abdominal pain,   no nausea,   no vomiting,   no constipation,   no diarrhea  : no dysuria,   no frequency  NEURO: no headache,   no dizziness  PSYCH: no depression,   not anxious  Derm : no rash    MEDICATIONS  (STANDING):  allopurinol 100 milliGRAM(s) Oral daily  amLODIPine   Tablet 5 milliGRAM(s) Oral daily  artificial  tears Solution 1 Drop(s) Both EYES two times a day  atorvastatin 20 milliGRAM(s) Oral at bedtime  chlorhexidine 4% Liquid 1 Application(s) Topical <User Schedule>  dextrose 40% Gel 15 Gram(s) Oral once  dextrose 5%. 1000 milliLiter(s) (50 mL/Hr) IV Continuous <Continuous>  dextrose 5%. 1000 milliLiter(s) (100 mL/Hr) IV Continuous <Continuous>  dextrose 50% Injectable 25 Gram(s) IV Push once  dextrose 50% Injectable 12.5 Gram(s) IV Push once  dextrose 50% Injectable 25 Gram(s) IV Push once  ertapenem  IVPB 1000 milliGRAM(s) IV Intermittent every 24 hours  glucagon  Injectable 1 milliGRAM(s) IntraMuscular once  heparin   Injectable 5000 Unit(s) SubCutaneous every 8 hours  insulin lispro (ADMELOG) corrective regimen sliding scale   SubCutaneous every 6 hours  levETIRAcetam  Solution 750 milliGRAM(s) Oral two times a day  levothyroxine 75 MICROGram(s) Oral daily  metoprolol tartrate 50 milliGRAM(s) Oral two times a day  nystatin Cream 1 Application(s) Topical two times a day  vancomycin  IVPB 1000 milliGRAM(s) IV Intermittent every 24 hours    MEDICATIONS  (PRN):  acetaminophen     Tablet .. 650 milliGRAM(s) Oral every 6 hours PRN Temp greater or equal to 38C (100.4F), Mild Pain (1 - 3)  melatonin 3 milliGRAM(s) Oral at bedtime PRN Insomnia  sodium chloride 0.9% lock flush 10 milliLiter(s) IV Push every 1 hour PRN Pre/post blood products, medications, blood draw, and to maintain line patency      Vital Signs Last 24 Hrs  T(C): 36.5 (01 Jan 2022 04:55), Max: 37.2 (31 Dec 2021 14:03)  T(F): 97.7 (01 Jan 2022 04:55), Max: 98.9 (31 Dec 2021 14:03)  HR: 97 (01 Jan 2022 04:55) (83 - 100)  BP: 106/71 (01 Jan 2022 04:55) (93/48 - 128/77)  BP(mean): --  RR: 20 (01 Jan 2022 04:55) (19 - 20)  SpO2: 91% (01 Jan 2022 04:55) (91% - 93%)  CAPILLARY BLOOD GLUCOSE      POCT Blood Glucose.: 195 mg/dL (01 Jan 2022 06:43)  POCT Blood Glucose.: 188 mg/dL (01 Jan 2022 00:01)  POCT Blood Glucose.: 275 mg/dL (31 Dec 2021 18:18)  POCT Blood Glucose.: 237 mg/dL (31 Dec 2021 12:22)    I&O's Summary    30 Dec 2021 07:01  -  31 Dec 2021 07:00  --------------------------------------------------------  IN: 0 mL / OUT: 200 mL / NET: -200 mL    31 Dec 2021 07:01  -  01 Jan 2022 06:58  --------------------------------------------------------  IN: 1150 mL / OUT: 0 mL / NET: 1150 mL        PHYSICAL EXAM:  HEAD:  Atraumatic, Normocephalic  NECK: Supple, No   JVD  CHEST/LUNG:   no     rales,     no,    rhonchi  HEART: Regular rate and rhythm;         murmur  ABDOMEN: Soft, Nontender, ;   EXTREMITIES:     no   edema  NEUROLOGY:  alert    LABS:                        9.1    8.08  )-----------( 236      ( 01 Jan 2022 05:18 )             29.4     12-31    133<L>  |  98  |  19  ----------------------------<  204<H>  4.8   |  24  |  0.65    Ca    8.8      31 Dec 2021 10:30                              Consultant(s) Notes Reviewed:      Care Discussed with Consultants/Other Providers:

## 2022-01-01 NOTE — CHART NOTE - NSCHARTNOTEFT_GEN_A_CORE
Called by primary team. G-tube displacement. Re-inserted by primary. Recommend abdominal radiograph with injection of gastrografin into G-tube to confirm appropriate positioning. [phone]. Called by primary team. G-tube displacement. Re-inserted by primary. Recommend abdominal radiograph with injection of gastrografin into G-tube to confirm appropriate positioning. [phone].    Addendum 19:31:  Gastrografin study opacifies stomach and proximal small bowel w/out evidence of extraluminal contrast. Okay to use g-tube. If concern for persistent malposition and/or peritonitis arises, can plan for fluoroscopic tube check/change. Called by primary team. G-tube displacement. Re-inserted by primary. Recommend abdominal radiograph with injection of gastrografin into G-tube to confirm appropriate positioning. [phone].    Addendum 19:31:  Gastrografin study opacifies stomach and proximal small bowel w/out evidence of extraluminal contrast.

## 2022-01-02 NOTE — PROGRESS NOTE ADULT - SUBJECTIVE AND OBJECTIVE BOX
Vascular & Interventional Radiology    Interval history: Gastrografin study yesterday opacifies stomach and proximal small bowel w/out evidence of extraluminal contrast. However, continued concern by primary team regarding safety of tube feeds.    Allergies: Toradol (Urticaria (Mild to Mod); Rash (Mild to Mod))    Medications (Abx/Cardiac/Anticoagulation/Blood Products)    amLODIPine   Tablet: 5 milliGRAM(s) Oral (01-01 @ 05:55)  ertapenem  IVPB: 120 mL/Hr IV Intermittent (01-01 @ 14:34)  heparin   Injectable: 5000 Unit(s) SubCutaneous (01-02 @ 06:01)  metoprolol tartrate: 50 milliGRAM(s) Oral (01-01 @ 09:15)  metoprolol tartrate Injectable: 5 milliGRAM(s) IV Push (01-01 @ 19:23)  metoprolol tartrate Injectable: 5 milliGRAM(s) IV Push (01-02 @ 06:01)  vancomycin  IVPB: 250 mL/Hr IV Intermittent (01-01 @ 12:55)    Data:  T(C): 36.4  HR: 94  BP: 130/74  RR: 18  SpO2: 93%    -WBC 6.50 / HgB 9.2 / Hct 29.1 / Plt 257  -Na 134 / Cl 100 / BUN 15 / Glucose 169  -K 4.3 / CO2 22 / Cr 0.56  -ALT -- / Alk Phos -- / T.Bili --  -INR1.04      Assessment:  86y Female with dislodge G-tube (initial placement 2017).     Plan:   - The catheter likely has a well formed tract and appears appropriately positioned on gastrografin study. Okay to use.  - Chart notes concern regarding undersized indwelling tube. Given that and recent dislodgement, can plan for tube check and exchange Monday 1/3.  Vascular & Interventional Radiology    Interval history: Gastrografin study yesterday opacifies stomach and proximal small bowel w/out evidence of extraluminal contrast. However, continued concern by primary team regarding safety of tube feeds.    Allergies: Toradol (Urticaria (Mild to Mod); Rash (Mild to Mod))    Medications (Abx/Cardiac/Anticoagulation/Blood Products)    amLODIPine   Tablet: 5 milliGRAM(s) Oral (01-01 @ 05:55)  ertapenem  IVPB: 120 mL/Hr IV Intermittent (01-01 @ 14:34)  heparin   Injectable: 5000 Unit(s) SubCutaneous (01-02 @ 06:01)  metoprolol tartrate: 50 milliGRAM(s) Oral (01-01 @ 09:15)  metoprolol tartrate Injectable: 5 milliGRAM(s) IV Push (01-01 @ 19:23)  metoprolol tartrate Injectable: 5 milliGRAM(s) IV Push (01-02 @ 06:01)  vancomycin  IVPB: 250 mL/Hr IV Intermittent (01-01 @ 12:55)    Data:  T(C): 36.4  HR: 94  BP: 130/74  RR: 18  SpO2: 93%    -WBC 6.50 / HgB 9.2 / Hct 29.1 / Plt 257  -Na 134 / Cl 100 / BUN 15 / Glucose 169  -K 4.3 / CO2 22 / Cr 0.56  -ALT -- / Alk Phos -- / T.Bili --  -INR1.04      Assessment:  86y Female with dislodge G-tube (initial placement 2017).     Plan:   - The catheter likely has a well formed tract and appears appropriately positioned on gastrografin study. Okay to use.  - Chart notes concern regarding undersized indwelling tube. Given that and recent dislodgement, can plan for tube check and exchange Monday 1/3.  - Place IR procedure order under Dr. Ross.  Vascular & Interventional Radiology    Interval history: Gastrografin study yesterday opacifies stomach and proximal small bowel w/out evidence of extraluminal contrast.    Allergies: Toradol (Urticaria (Mild to Mod); Rash (Mild to Mod))    Medications (Abx/Cardiac/Anticoagulation/Blood Products)    amLODIPine   Tablet: 5 milliGRAM(s) Oral (01-01 @ 05:55)  ertapenem  IVPB: 120 mL/Hr IV Intermittent (01-01 @ 14:34)  heparin   Injectable: 5000 Unit(s) SubCutaneous (01-02 @ 06:01)  metoprolol tartrate: 50 milliGRAM(s) Oral (01-01 @ 09:15)  metoprolol tartrate Injectable: 5 milliGRAM(s) IV Push (01-01 @ 19:23)  metoprolol tartrate Injectable: 5 milliGRAM(s) IV Push (01-02 @ 06:01)  vancomycin  IVPB: 250 mL/Hr IV Intermittent (01-01 @ 12:55)    Data:  T(C): 36.4  HR: 94  BP: 130/74  RR: 18  SpO2: 93%    -WBC 6.50 / HgB 9.2 / Hct 29.1 / Plt 257  -Na 134 / Cl 100 / BUN 15 / Glucose 169  -K 4.3 / CO2 22 / Cr 0.56  -ALT -- / Alk Phos -- / T.Bili --  -INR1.04      Assessment:  86y Female with dislodge G-tube (initial placement 2017).     Plan:   - Plan for tube check and exchange Monday 1/3.  - Place IR procedure order under Dr. Ross.

## 2022-01-02 NOTE — PROGRESS NOTE ADULT - SUBJECTIVE AND OBJECTIVE BOX
CARDIOLOGY     PROGRESS  NOTE   ________________________________________________    CHIEF COMPLAINT:Patient is a 86y old  Female who presents with a chief complaint of fevers/sob (02 Jan 2022 07:20)  no complain.  	  REVIEW OF SYSTEMS:  CONSTITUTIONAL: No fever, weight loss, or fatigue  EYES: No eye pain, visual disturbances, or discharge  ENT:  No difficulty hearing, tinnitus, vertigo; No sinus or throat pain  NECK: No pain or stiffness  RESPIRATORY: No cough, wheezing, chills or hemoptysis; No Shortness of Breath  CARDIOVASCULAR: No chest pain, palpitations, passing out, dizziness, or leg swelling  GASTROINTESTINAL: No abdominal or epigastric pain. No nausea, vomiting, or hematemesis; No diarrhea or constipation. No melena or hematochezia.  GENITOURINARY: No dysuria, frequency, hematuria, or incontinence  NEUROLOGICAL: No headaches, memory loss, loss of strength, numbness, or tremors  SKIN: No itching, burning, rashes, or lesions   LYMPH Nodes: No enlarged glands  ENDOCRINE: No heat or cold intolerance; No hair loss  MUSCULOSKELETAL: No joint pain or swelling; No muscle, back, or extremity pain  PSYCHIATRIC: No depression, anxiety, mood swings, or difficulty sleeping  HEME/LYMPH: No easy bruising, or bleeding gums  ALLERGY AND IMMUNOLOGIC: No hives or eczema	    [ ] All others negative	  [ ] Unable to obtain    PHYSICAL EXAM:  T(C): 36.4 (01-02-22 @ 08:33), Max: 37.4 (01-01-22 @ 13:33)  HR: 94 (01-02-22 @ 08:33) (92 - 108)  BP: 130/74 (01-02-22 @ 04:40) (93/61 - 130/74)  RR: 18 (01-02-22 @ 08:33) (18 - 28)  SpO2: 93% (01-02-22 @ 08:33) (93% - 97%)  Wt(kg): --  I&O's Summary    01 Jan 2022 07:01  -  02 Jan 2022 07:00  --------------------------------------------------------  IN: 0 mL / OUT: 1200 mL / NET: -1200 mL        Appearance: Normal	  HEENT:   Normal oral mucosa, PERRL, EOMI	  Lymphatic: No lymphadenopathy  Cardiovascular: Normal S1 S2, No JVD, + murmurs, No edema  Respiratory: Lungs clear to auscultation	  Psychiatry: A & O x 3, Mood & affect appropriate  Gastrointestinal:  Soft, Non-tender, + BS	  Skin: No rashes, No ecchymoses, No cyanosis	  Neurologic: Non-focal  Extremities: Normal range of motion, No clubbing, cyanosis or edema  Vascular: Peripheral pulses palpable 2+ bilaterally    MEDICATIONS  (STANDING):  allopurinol 100 milliGRAM(s) Oral daily  amLODIPine   Tablet 5 milliGRAM(s) Oral daily  artificial  tears Solution 1 Drop(s) Both EYES two times a day  atorvastatin 20 milliGRAM(s) Oral at bedtime  chlorhexidine 4% Liquid 1 Application(s) Topical <User Schedule>  dextrose 40% Gel 15 Gram(s) Oral once  dextrose 5% + sodium chloride 0.9%. 1000 milliLiter(s) (50 mL/Hr) IV Continuous <Continuous>  dextrose 5%. 1000 milliLiter(s) (50 mL/Hr) IV Continuous <Continuous>  dextrose 5%. 1000 milliLiter(s) (100 mL/Hr) IV Continuous <Continuous>  dextrose 50% Injectable 25 Gram(s) IV Push once  dextrose 50% Injectable 12.5 Gram(s) IV Push once  dextrose 50% Injectable 25 Gram(s) IV Push once  ertapenem  IVPB 1000 milliGRAM(s) IV Intermittent every 24 hours  glucagon  Injectable 1 milliGRAM(s) IntraMuscular once  heparin   Injectable 5000 Unit(s) SubCutaneous every 8 hours  insulin lispro (ADMELOG) corrective regimen sliding scale   SubCutaneous every 6 hours  levETIRAcetam  IVPB 750 milliGRAM(s) IV Intermittent every 12 hours  levothyroxine Injectable 35 MICROGram(s) IV Push at bedtime  metoprolol tartrate Injectable 5 milliGRAM(s) IV Push every 6 hours  nystatin Cream 1 Application(s) Topical two times a day  vancomycin  IVPB 1000 milliGRAM(s) IV Intermittent every 24 hours      TELEMETRY: 	    ECG:  	  RADIOLOGY:  OTHER: 	  	  LABS:	 	    CARDIAC MARKERS:                                9.2    6.50  )-----------( 257      ( 02 Jan 2022 06:55 )             29.1     01-02    134<L>  |  100  |  15  ----------------------------<  169<H>  4.3   |  22  |  0.56    Ca    8.8      02 Jan 2022 06:53      proBNP: Serum Pro-Brain Natriuretic Peptide: 375 pg/mL (12-19 @ 08:45)    Lipid Profile:   HgA1c:   TSH:   2) Positive BCX, Therapeutic Drug Monitoring  BCx (12/19) with 3/4 Staph epi  BCx (12/21) with 2/4 Staph epi  BCx (12/24) with NGTD  TTE (12/21) with possible vegetation on the left or non-coronary cusp of the aortic valve  --lower Vancomycin 1gm IV Q24H  --Check weekly trough  --cbc, bun/creatinine, vanco trough once weekly while on abx   --Continue to monitor renal function and vancomycin levels to avoid nephrotoxicity / ototoxicity secondary to vancomycin  --Discussed with Cardiology- high risk for ESDRAS. Will treat empirically for IE  --surveillance bcx post iv abx   --picc  --total 6 weeks abx - day 7 of 42 of abx       Assessment and plan  ---------------------------  87 yo F from [facility], CVA with residual left weakness (~3 years ago), PEG for dysphagia, hypothyroid, COPD (on no home Oxygen), HTN, gout, p/w fever, Pt has respiratory distress, wheezing, concerning for respiratory infection, otherwise no other symptoms is reported on the chart. EMS gave solumedrol 125 through peripheral, small iv line.  pt is well known to me with hx of htn, ashd, s/p MI, cva with increasing sob on bipap in er.  can not get any hx from the pt.  sob ?respiratory failure ?sec to pneumoniae  ?pleural effusion, check ct chest noted  continue bp meds  dvt prophylaxis  abx  will consider to possible repeat echo  duoneb  dvt prophylaxis  ct scan/ surgery/ ID noted  continue amlodipine and Metoprolol for now  tachycardia sec to underlying condition, continue to observe  oob to chair as tolerated  IR, continue abx/ surgery noted  incentive spirometry  replete K  abx as per ID  repeat blood cultures, negative  increase metoprolol to 50 mg bid sec to increase bp observe closely  esdras , high risk sec to sleep apnea and her overall condition, agree with empiric therapy  will repeat  TTE in 6 weeeks  decrease Norvasc to 5 mg daily, bp is better controlled    	                    CARDIOLOGY     PROGRESS  NOTE   ________________________________________________    CHIEF COMPLAINT:Patient is a 86y old  Female who presents with a chief complaint of fevers/sob (02 Jan 2022 07:20)  no complain.  	  REVIEW OF SYSTEMS:  CONSTITUTIONAL: No fever, weight loss, or fatigue  EYES: No eye pain, visual disturbances, or discharge  ENT:  No difficulty hearing, tinnitus, vertigo; No sinus or throat pain  NECK: No pain or stiffness  RESPIRATORY: No cough, wheezing, chills or hemoptysis; No Shortness of Breath  CARDIOVASCULAR: No chest pain, palpitations, passing out, dizziness, or leg swelling  GASTROINTESTINAL: No abdominal or epigastric pain. No nausea, vomiting, or hematemesis; No diarrhea or constipation. No melena or hematochezia.  GENITOURINARY: No dysuria, frequency, hematuria, or incontinence  NEUROLOGICAL: No headaches, memory loss, loss of strength, numbness, or tremors  SKIN: No itching, burning, rashes, or lesions   LYMPH Nodes: No enlarged glands  ENDOCRINE: No heat or cold intolerance; No hair loss  MUSCULOSKELETAL: No joint pain or swelling; No muscle, back, or extremity pain  PSYCHIATRIC: No depression, anxiety, mood swings, or difficulty sleeping  HEME/LYMPH: No easy bruising, or bleeding gums  ALLERGY AND IMMUNOLOGIC: No hives or eczema	    [ ] All others negative	  [ ] Unable to obtain    PHYSICAL EXAM:  T(C): 36.4 (01-02-22 @ 08:33), Max: 37.4 (01-01-22 @ 13:33)  HR: 94 (01-02-22 @ 08:33) (92 - 108)  BP: 130/74 (01-02-22 @ 04:40) (93/61 - 130/74)  RR: 18 (01-02-22 @ 08:33) (18 - 28)  SpO2: 93% (01-02-22 @ 08:33) (93% - 97%)  Wt(kg): --  I&O's Summary    01 Jan 2022 07:01  -  02 Jan 2022 07:00  --------------------------------------------------------  IN: 0 mL / OUT: 1200 mL / NET: -1200 mL        Appearance: Normal	  HEENT:   Normal oral mucosa, PERRL, EOMI	  Lymphatic: No lymphadenopathy  Cardiovascular: Normal S1 S2, No JVD, + murmurs, No edema  Respiratory: Lungs clear to auscultation	  Psychiatry: A & O x 3, Mood & affect appropriate  Gastrointestinal:  Soft, Non-tender, + BS	  Skin: No rashes, No ecchymoses, No cyanosis	  Neurologic: Non-focal  Extremities: Normal range of motion, No clubbing, cyanosis or edema  Vascular: Peripheral pulses palpable 2+ bilaterally    MEDICATIONS  (STANDING):  allopurinol 100 milliGRAM(s) Oral daily  amLODIPine   Tablet 5 milliGRAM(s) Oral daily  artificial  tears Solution 1 Drop(s) Both EYES two times a day  atorvastatin 20 milliGRAM(s) Oral at bedtime  chlorhexidine 4% Liquid 1 Application(s) Topical <User Schedule>  dextrose 40% Gel 15 Gram(s) Oral once  dextrose 5% + sodium chloride 0.9%. 1000 milliLiter(s) (50 mL/Hr) IV Continuous <Continuous>  dextrose 5%. 1000 milliLiter(s) (50 mL/Hr) IV Continuous <Continuous>  dextrose 5%. 1000 milliLiter(s) (100 mL/Hr) IV Continuous <Continuous>  dextrose 50% Injectable 25 Gram(s) IV Push once  dextrose 50% Injectable 12.5 Gram(s) IV Push once  dextrose 50% Injectable 25 Gram(s) IV Push once  ertapenem  IVPB 1000 milliGRAM(s) IV Intermittent every 24 hours  glucagon  Injectable 1 milliGRAM(s) IntraMuscular once  heparin   Injectable 5000 Unit(s) SubCutaneous every 8 hours  insulin lispro (ADMELOG) corrective regimen sliding scale   SubCutaneous every 6 hours  levETIRAcetam  IVPB 750 milliGRAM(s) IV Intermittent every 12 hours  levothyroxine Injectable 35 MICROGram(s) IV Push at bedtime  metoprolol tartrate Injectable 5 milliGRAM(s) IV Push every 6 hours  nystatin Cream 1 Application(s) Topical two times a day  vancomycin  IVPB 1000 milliGRAM(s) IV Intermittent every 24 hours      TELEMETRY: 	    ECG:  	  RADIOLOGY:  OTHER: 	  	  LABS:	 	    CARDIAC MARKERS:                                9.2    6.50  )-----------( 257      ( 02 Jan 2022 06:55 )             29.1     01-02    134<L>  |  100  |  15  ----------------------------<  169<H>  4.3   |  22  |  0.56    Ca    8.8      02 Jan 2022 06:53      proBNP: Serum Pro-Brain Natriuretic Peptide: 375 pg/mL (12-19 @ 08:45)    Lipid Profile:   HgA1c:   TSH:   2) Positive BCX, Therapeutic Drug Monitoring  BCx (12/19) with 3/4 Staph epi  BCx (12/21) with 2/4 Staph epi  BCx (12/24) with NGTD  TTE (12/21) with possible vegetation on the left or non-coronary cusp of the aortic valve  --lower Vancomycin 1gm IV Q24H  --Check weekly trough  --cbc, bun/creatinine, vanco trough once weekly while on abx   --Continue to monitor renal function and vancomycin levels to avoid nephrotoxicity / ototoxicity secondary to vancomycin  --Discussed with Cardiology- high risk for ESDRAS. Will treat empirically for IE  --surveillance bcx post iv abx   --picc  --total 6 weeks abx - day 7 of 42 of abx     Call by RN patient PEG tube came out.  Patient seen at bedside, PEG tube very small, but was clean and replaced back into peg tube site  Tube feeding held. Informed attending of PEG tube coming out, Instructed to call IR. IR consult placed   Received call from IR consult team.   Recommend abdominal Xray with injection of gastrografin into PEG tube for correct placement.      Assessment and plan  ---------------------------  85 yo F from [facility] re[facility], CVA with residual left weakness (~3 years ago), PEG for dysphagia, hypothyroid, COPD (on no home Oxygen), HTN, gout, p/w fever, Pt has respiratory distress, wheezing, concerning for respiratory infection, otherwise no other symptoms is reported on the chart. EMS gave solumedrol 125 through peripheral, small iv line.  pt is well known to me with hx of htn, ashd, s/p MI, cva with increasing sob on bipap in er.  can not get any hx from the pt.  sob ?respiratory failure ?sec to pneumoniae  ?pleural effusion, check ct chest noted  continue bp meds  dvt prophylaxis  abx  will consider to possible repeat echo  duoneb  dvt prophylaxis  ct scan/ surgery/ ID noted  continue amlodipine and Metoprolol for now  tachycardia sec to underlying condition, continue to observe  oob to chair as tolerated  IR, continue abx/ surgery noted  incentive spirometry  replete K  abx as per ID  repeat blood cultures, negative  increase metoprolol to 50 mg bid sec to increase bp observe closely  esdras , high risk sec to sleep apnea and her overall condition, agree with empiric therapy  will repeat  TTE in 6 weeeks  decrease Norvasc to 5 mg daily, bp is better controlled, if pt can not take po will switch meds to IV

## 2022-01-02 NOTE — PROGRESS NOTE ADULT - ASSESSMENT
86   year old female      h/o hemorrhagic CVA, has  PEG,  HTN, MI,      HLD, gout   right Ca breast. s/p mastectomy in 2019,  s/p  sentinel node  localization.  4/4,  were  negative         *  p/w SOB and  acute respiratory distress,  was  on   bipap  in er   with  elevated wbc of 17,000 on arrival,  from pna/  probable aspiration/ gram negative pna  cxr,? pl effusion, right  lung opacit   *   s/p cva, has  PEG,  npo/,  on  synthroid, Keppra  ,  *  HTN, on meds  per  card  prior  echo,  normal ef  *  h/o ca breast. /, s/p  R  mastectomy  *  bacteremia. / staph epi, meth R  ct  c/a/p, ?  pna, perforated  appendicitis,  ?  mets  to  acetabulum   surg/ IR  and  oncology eval dr pacheco    appreciate  excellent  care of  house  staff  per  surg, no  intervention  *   sepsis  on  arrival, is  resolving  from  perforated  appendix/ and  Staph epi  bacteremia which is  persistent,  hence  cannot  dismiss  it as  a contaminant    and  also, with  echo, vegetation on  aortic  valve/ endocarditis, ?  esdras,  may  not  change   rx  plan,  will need  extended  course  of  ab   / will defer to card    was on iv vanco, . Cefepime   and flagyl   rpt ct  noted,  RLL  phlegmon,  and  80  cc of  pus  drained by  IR .  c/s  with  ecoli, e coccus  and  sophia  pe r card , pt high risk for esdras  and given that . this will not alter rx. pt  will need   extended  course  of ab   elevated lfts , noted, are normal  picc  line   by IR   done  on iv  vanco and ertapenem/ follow vanco level  peg dislodged.  awiar  IR  eval/  pt  remains  npo       rd< from: Xray Chest 1 View- PORTABLE-Urgent (12.19.21 @ 09:20) >  IMPRESSION:  Low lung volumes. New small left lower lung hazy opacity may represent   atelectasis versus pleural effusion. Redemonstration of right upper lung   perihilar opacity largely unchanged prior exam.  --- End of Report --  < end of copied text >

## 2022-01-02 NOTE — PROGRESS NOTE ADULT - ATTENDING COMMENTS
rlq abscess increased in size- rec IR drainage
86y Female with dislodge G-tube (initial placement 2017).     Plan:   - Plan for tube check and exchange Monday 1/3. Do not use tube until after procedure  - Place IR procedure order
rec IR drainage, abx

## 2022-01-02 NOTE — PROGRESS NOTE ADULT - SUBJECTIVE AND OBJECTIVE BOX
afberile  REVIEW OF SYSTEMS:  GEN: no fever,    no chills  RESP: no SOB,   no cough  CVS: no chest pain,   no palpitations  GI: no abdominal pain,   no nausea,   no vomiting,   no constipation,   no diarrhea  : no dysuria,   no frequency  NEURO: no headache,   no dizziness  PSYCH: no depression,   not anxious  Derm : no rash    MEDICATIONS  (STANDING):  allopurinol 100 milliGRAM(s) Oral daily  amLODIPine   Tablet 5 milliGRAM(s) Oral daily  artificial  tears Solution 1 Drop(s) Both EYES two times a day  atorvastatin 20 milliGRAM(s) Oral at bedtime  chlorhexidine 4% Liquid 1 Application(s) Topical <User Schedule>  dextrose 40% Gel 15 Gram(s) Oral once  dextrose 5% + sodium chloride 0.9%. 1000 milliLiter(s) (50 mL/Hr) IV Continuous <Continuous>  dextrose 5%. 1000 milliLiter(s) (50 mL/Hr) IV Continuous <Continuous>  dextrose 5%. 1000 milliLiter(s) (100 mL/Hr) IV Continuous <Continuous>  dextrose 50% Injectable 25 Gram(s) IV Push once  dextrose 50% Injectable 12.5 Gram(s) IV Push once  dextrose 50% Injectable 25 Gram(s) IV Push once  ertapenem  IVPB 1000 milliGRAM(s) IV Intermittent every 24 hours  glucagon  Injectable 1 milliGRAM(s) IntraMuscular once  heparin   Injectable 5000 Unit(s) SubCutaneous every 8 hours  insulin lispro (ADMELOG) corrective regimen sliding scale   SubCutaneous every 6 hours  levETIRAcetam  IVPB 750 milliGRAM(s) IV Intermittent every 12 hours  levothyroxine Injectable 35 MICROGram(s) IV Push at bedtime  metoprolol tartrate Injectable 5 milliGRAM(s) IV Push every 6 hours  nystatin Cream 1 Application(s) Topical two times a day  vancomycin  IVPB 1000 milliGRAM(s) IV Intermittent every 24 hours    MEDICATIONS  (PRN):  acetaminophen     Tablet .. 650 milliGRAM(s) Oral every 6 hours PRN Temp greater or equal to 38C (100.4F), Mild Pain (1 - 3)  melatonin 3 milliGRAM(s) Oral at bedtime PRN Insomnia  sodium chloride 0.9% lock flush 10 milliLiter(s) IV Push every 1 hour PRN Pre/post blood products, medications, blood draw, and to maintain line patency      Vital Signs Last 24 Hrs  T(C): 36.6 (02 Jan 2022 04:40), Max: 37.4 (01 Jan 2022 13:33)  T(F): 97.8 (02 Jan 2022 04:40), Max: 99.3 (01 Jan 2022 13:33)  HR: 108 (02 Jan 2022 04:40) (92 - 108)  BP: 130/74 (02 Jan 2022 04:40) (93/61 - 130/74)  BP(mean): --  RR: 18 (02 Jan 2022 04:40) (18 - 28)  SpO2: 95% (02 Jan 2022 04:40) (90% - 97%)  CAPILLARY BLOOD GLUCOSE      POCT Blood Glucose.: 176 mg/dL (02 Jan 2022 06:17)  POCT Blood Glucose.: 132 mg/dL (02 Jan 2022 00:00)  POCT Blood Glucose.: 157 mg/dL (01 Jan 2022 17:45)  POCT Blood Glucose.: 208 mg/dL (01 Jan 2022 11:12)    I&O's Summary    01 Jan 2022 07:01  -  02 Jan 2022 07:00  --------------------------------------------------------  IN: 0 mL / OUT: 1200 mL / NET: -1200 mL        PHYSICAL EXAM:  HEAD:  Atraumatic, Normocephalic  NECK: Supple, No   JVD  CHEST/LUNG:   no     rales,     no,    rhonchi  HEART: Regular rate and rhythm;         murmur  ABDOMEN: Soft, Nontender, ;   EXTREMITIES:     no   edema  NEUROLOGY:  alert    LABS:                        9.1    8.08  )-----------( 236      ( 01 Jan 2022 05:18 )             29.4     12-31    133<L>  |  98  |  19  ----------------------------<  204<H>  4.8   |  24  |  0.65    Ca    8.8      31 Dec 2021 10:30                              Consultant(s) Notes Reviewed:      Care Discussed with Consultants/Other Providers:

## 2022-01-03 NOTE — PROCEDURE NOTE - PROCEDURE FINDINGS AND DETAILS
100cc pus 10.2F drain placed in rlq
16fr Gastrostomy tube placed under fluoroscopy guidance. Full report to follow.
Small amorphic cavity noted with no communication to nearby structures. Drain removed.

## 2022-01-03 NOTE — PROGRESS NOTE ADULT - ASSESSMENT
86   year old female      h/o hemorrhagic CVA, has  PEG,  HTN, MI,      HLD, gout   right Ca breast. s/p mastectomy in 2019,  s/p  sentinel node  localization.  4/4,  were  negative         *  p/w SOB and  acute respiratory distress,  was  on   bipap  in er   with  elevated wbc of 17,000 on arrival,  from pna/  probable aspiration/ gram negative pna  cxr,? pl effusion, right  lung opacit   *   s/p cva, has  PEG,  npo/,  on  synthroid, Keppra  ,  *  HTN, on meds  per  card  prior  echo,  normal ef  *  h/o ca breast. /, s/p  R  mastectomy  *  bacteremia. / staph epi, meth R  ct  c/a/p, ?  pna, perforated  appendicitis,  ?  mets  to  acetabulum   surg/ IR  and  oncology eval dr pacheco    appreciate  excellent  care of  house  staff  per  surg, no  intervention  *   sepsis  on  arrival, is  resolving  from  perforated  appendix/ and  Staph epi  bacteremia which is  persistent,  hence  cannot  dismiss  it as  a contaminant    and  also, with  echo, vegetation on  aortic  valve/ endocarditis, ?  esdras,  may  not  change   rx  plan,  will need  extended  course  of  ab   / will defer to card    was on iv vanco, . Cefepime   and flagyl   rpt ct  noted,  RLL  phlegmon,  and  80  cc of  pus  drained by  IR .  c/s  with  ecoli, e coccus  and  sophia  pe r card , pt high risk for esdras  and given that . this will not alter rx. pt  will need   extended  course  of ab   elevated lfts , noted, are normal  picc  line   by IR   done  on iv  vanco and ertapenem/ follow vanco level  peg dislodged.  IR   replacements  on 1/3       rd< from: Xray Chest 1 View- PORTABLE-Urgent (12.19.21 @ 09:20) >  IMPRESSION:  Low lung volumes. New small left lower lung hazy opacity may represent   atelectasis versus pleural effusion. Redemonstration of right upper lung   perihilar opacity largely unchanged prior exam.  --- End of Report --  < end of copied text >

## 2022-01-03 NOTE — PROCEDURE NOTE - NSTIMEOUT_GEN_A_CORE
Patient's first and last name, , procedure, and correct site confirmed prior to the start of procedure.
No
[Alert] : alert
[Well Nourished] : well nourished
[No Acute Distress] : no acute distress
[Healthy Appearance] : healthy appearance
[Well Developed] : well developed
[Normal Pupil & Iris Size/Symmetry] : normal pupil and iris size and symmetry
[No Photophobia] : no photophobia
[No Discharge] : no discharge
[Sclera Not Icteric] : sclera not icteric
[Normal TMs] : both tympanic membranes were normal
[Normal Nasal Mucosa] : the nasal mucosa was normal
[Normal Lips/Tongue] : the lips and tongue were normal
[Normal Outer Ear/Nose] : the ears and nose were normal in appearance
[Normal Tonsils] : normal tonsils
[No Thrush] : no thrush
[Normal Dentition] : normal dentition
[No Oral Lesions or Ulcers] : no oral lesions or ulcers
[Supple] : the neck was supple
[Normal Rate and Effort] : normal respiratory rhythm and effort
[Normal Palpation] : palpation of the chest revealed no abnormalities
[No Crackles] : no crackles
[Bilateral Audible Breath Sounds] : bilateral audible breath sounds
[No Retractions] : no retractions
[Normal Rate] : heart rate was normal 
[Normal S1, S2] : normal S1 and S2
[Soft] : abdomen soft
[Regular Rhythm] : with a regular rhythm
[Not Tender] : non-tender
[Not Distended] : not distended
[Normal Cervical Lymph Nodes] : cervical
[No HSM] : no hepato-splenomegaly
[Skin Intact] : skin intact 
[Normal Axillary Lumph Nodes] : axillary
[No Rash] : no rash
[No Skin Lesions] : no skin lesions
[No Joint Swelling or Erythema] : no joint swelling or erythema
[No Edema] : no edema
[No clubbing] : no clubbing
[No Cyanosis] : no cyanosis
[Normal Mood] : mood was normal
[Normal Affect] : affect was normal
[Alert, Awake, Oriented as Age-Appropriate] : alert, awake, oriented as age appropriate

## 2022-01-03 NOTE — PROCEDURE NOTE - NSPROCNAME_GEN_A_CORE
Interventional Radiology
Peripheral Line Insertion

## 2022-01-03 NOTE — PROGRESS NOTE ADULT - SUBJECTIVE AND OBJECTIVE BOX
afberile    REVIEW OF SYSTEMS:  GEN: no fever,    no chills  RESP: no SOB,   no cough  CVS: no chest pain,   no palpitations  GI: no abdominal pain,   no nausea,   no vomiting,   no constipation,   no diarrhea  : no dysuria,   no frequency  NEURO: no headache,   no dizziness  PSYCH: no depression,   not anxious  Derm : no rash    MEDICATIONS  (STANDING):  allopurinol 100 milliGRAM(s) Oral daily  artificial  tears Solution 1 Drop(s) Both EYES two times a day  atorvastatin 20 milliGRAM(s) Oral at bedtime  chlorhexidine 4% Liquid 1 Application(s) Topical <User Schedule>  dextrose 40% Gel 15 Gram(s) Oral once  dextrose 5% + sodium chloride 0.9%. 1000 milliLiter(s) (50 mL/Hr) IV Continuous <Continuous>  dextrose 5%. 1000 milliLiter(s) (50 mL/Hr) IV Continuous <Continuous>  dextrose 5%. 1000 milliLiter(s) (100 mL/Hr) IV Continuous <Continuous>  dextrose 50% Injectable 25 Gram(s) IV Push once  dextrose 50% Injectable 12.5 Gram(s) IV Push once  dextrose 50% Injectable 25 Gram(s) IV Push once  ertapenem  IVPB 1000 milliGRAM(s) IV Intermittent every 24 hours  glucagon  Injectable 1 milliGRAM(s) IntraMuscular once  heparin   Injectable 5000 Unit(s) SubCutaneous every 8 hours  insulin lispro (ADMELOG) corrective regimen sliding scale   SubCutaneous every 6 hours  levETIRAcetam  IVPB 750 milliGRAM(s) IV Intermittent every 12 hours  levothyroxine Injectable 35 MICROGram(s) IV Push at bedtime  metoprolol tartrate Injectable 5 milliGRAM(s) IV Push every 6 hours  nystatin Cream 1 Application(s) Topical two times a day  vancomycin  IVPB 1000 milliGRAM(s) IV Intermittent every 24 hours    MEDICATIONS  (PRN):  acetaminophen     Tablet .. 650 milliGRAM(s) Oral every 6 hours PRN Temp greater or equal to 38C (100.4F), Mild Pain (1 - 3)  albuterol/ipratropium for Nebulization 3 milliLiter(s) Nebulizer every 6 hours PRN Shortness of Breath and/or Wheezing  melatonin 3 milliGRAM(s) Oral at bedtime PRN Insomnia  sodium chloride 0.9% lock flush 10 milliLiter(s) IV Push every 1 hour PRN Pre/post blood products, medications, blood draw, and to maintain line patency      Vital Signs Last 24 Hrs  T(C): 36.6 (03 Jan 2022 08:36), Max: 36.7 (02 Jan 2022 20:06)  T(F): 97.9 (03 Jan 2022 08:36), Max: 98 (02 Jan 2022 20:06)  HR: 93 (03 Jan 2022 08:36) (93 - 97)  BP: 110/72 (03 Jan 2022 08:36) (109/67 - 145/78)  BP(mean): --  RR: 19 (03 Jan 2022 08:36) (18 - 19)  SpO2: 93% (03 Jan 2022 08:36) (92% - 93%)  CAPILLARY BLOOD GLUCOSE      POCT Blood Glucose.: 151 mg/dL (03 Jan 2022 05:38)  POCT Blood Glucose.: 133 mg/dL (02 Jan 2022 23:57)  POCT Blood Glucose.: 144 mg/dL (02 Jan 2022 17:28)  POCT Blood Glucose.: 210 mg/dL (02 Jan 2022 12:02)    I&O's Summary    02 Jan 2022 07:01  -  03 Jan 2022 07:00  --------------------------------------------------------  IN: 800 mL / OUT: 1700 mL / NET: -900 mL        PHYSICAL EXAM:  HEAD:  Atraumatic, Normocephalic  NECK: Supple, No   JVD  CHEST/LUNG:   no     rales,     no,    rhonchi  HEART: Regular rate and rhythm;         murmur  ABDOMEN: Soft, Nontender, ;   EXTREMITIES:    no    edema  NEUROLOGY:  alert    LABS:                        8.6    5.66  )-----------( 256      ( 03 Jan 2022 07:11 )             28.1     01-03    137  |  103  |  10  ----------------------------<  157<H>  4.1   |  21<L>  |  0.56    Ca    8.7      03 Jan 2022 07:10      PT/INR - ( 03 Jan 2022 07:12 )   PT: 12.7 sec;   INR: 1.06 ratio         PTT - ( 03 Jan 2022 07:12 )  PTT:30.8 sec                        Consultant(s) Notes Reviewed:      Care Discussed with Consultants/Other Providers:

## 2022-01-03 NOTE — PROGRESS NOTE ADULT - SUBJECTIVE AND OBJECTIVE BOX
CARDIOLOGY     PROGRESS  NOTE   ________________________________________________    CHIEF COMPLAINT:Patient is a 86y old  Female who presents with a chief complaint of fevers/sob (03 Jan 2022 09:26)  no complain.  	  REVIEW OF SYSTEMS:  CONSTITUTIONAL: No fever, weight loss, or fatigue  EYES: No eye pain, visual disturbances, or discharge  ENT:  No difficulty hearing, tinnitus, vertigo; No sinus or throat pain  NECK: No pain or stiffness  RESPIRATORY: No cough, wheezing, chills or hemoptysis; No Shortness of Breath  CARDIOVASCULAR: No chest pain, palpitations, passing out, dizziness, or leg swelling  GASTROINTESTINAL: No abdominal or epigastric pain. No nausea, vomiting, or hematemesis; No diarrhea or constipation. No melena or hematochezia.  GENITOURINARY: No dysuria, frequency, hematuria, or incontinence  NEUROLOGICAL: No headaches, memory loss, loss of strength, numbness, or tremors  SKIN: No itching, burning, rashes, or lesions   LYMPH Nodes: No enlarged glands  ENDOCRINE: No heat or cold intolerance; No hair loss  MUSCULOSKELETAL: No joint pain or swelling; No muscle, back, or extremity pain  PSYCHIATRIC: No depression, anxiety, mood swings, or difficulty sleeping  HEME/LYMPH: No easy bruising, or bleeding gums  ALLERGY AND IMMUNOLOGIC: No hives or eczema	    [ ] All others negative	  [ ] Unable to obtain    PHYSICAL EXAM:  T(C): 36.6 (01-03-22 @ 08:36), Max: 36.7 (01-02-22 @ 20:06)  HR: 93 (01-03-22 @ 08:36) (93 - 97)  BP: 110/72 (01-03-22 @ 08:36) (109/67 - 145/78)  RR: 19 (01-03-22 @ 08:36) (18 - 19)  SpO2: 93% (01-03-22 @ 08:36) (92% - 93%)  Wt(kg): --  I&O's Summary    02 Jan 2022 07:01  -  03 Jan 2022 07:00  --------------------------------------------------------  IN: 800 mL / OUT: 1700 mL / NET: -900 mL        Appearance: Normal	  HEENT:   Normal oral mucosa, PERRL, EOMI	  Lymphatic: No lymphadenopathy  Cardiovascular: Normal S1 S2, No JVD, + murmurs, No edema  Respiratory: Lungs clear to auscultation	  Psychiatry: A & O x 3, Mood & affect appropriate  Gastrointestinal:  Soft, Non-tender, + BS	  Skin: No rashes, No ecchymoses, No cyanosis	  Neurologic: Non-focal  Extremities: Normal range of motion, No clubbing, cyanosis or edema  Vascular: Peripheral pulses palpable 2+ bilaterally    MEDICATIONS  (STANDING):  allopurinol 100 milliGRAM(s) Oral daily  artificial  tears Solution 1 Drop(s) Both EYES two times a day  atorvastatin 20 milliGRAM(s) Oral at bedtime  chlorhexidine 4% Liquid 1 Application(s) Topical <User Schedule>  dextrose 40% Gel 15 Gram(s) Oral once  dextrose 5% + sodium chloride 0.9%. 1000 milliLiter(s) (50 mL/Hr) IV Continuous <Continuous>  dextrose 5%. 1000 milliLiter(s) (50 mL/Hr) IV Continuous <Continuous>  dextrose 5%. 1000 milliLiter(s) (100 mL/Hr) IV Continuous <Continuous>  dextrose 50% Injectable 25 Gram(s) IV Push once  dextrose 50% Injectable 12.5 Gram(s) IV Push once  dextrose 50% Injectable 25 Gram(s) IV Push once  ertapenem  IVPB 1000 milliGRAM(s) IV Intermittent every 24 hours  glucagon  Injectable 1 milliGRAM(s) IntraMuscular once  heparin   Injectable 5000 Unit(s) SubCutaneous every 8 hours  insulin lispro (ADMELOG) corrective regimen sliding scale   SubCutaneous every 6 hours  levETIRAcetam  IVPB 750 milliGRAM(s) IV Intermittent every 12 hours  levothyroxine Injectable 35 MICROGram(s) IV Push at bedtime  metoprolol tartrate Injectable 5 milliGRAM(s) IV Push every 6 hours  nystatin Cream 1 Application(s) Topical two times a day  vancomycin  IVPB 1000 milliGRAM(s) IV Intermittent every 24 hours      TELEMETRY: 	    ECG:  	  RADIOLOGY:  OTHER: 	  	  LABS:	 	    CARDIAC MARKERS:                                8.6    5.66  )-----------( 256      ( 03 Jan 2022 07:11 )             28.1     01-03    137  |  103  |  10  ----------------------------<  157<H>  4.1   |  21<L>  |  0.56    Ca    8.7      03 Jan 2022 07:10      proBNP: Serum Pro-Brain Natriuretic Peptide: 375 pg/mL (12-19 @ 08:45)    Lipid Profile:   HgA1c:   TSH:   PT/INR - ( 03 Jan 2022 07:12 )   PT: 12.7 sec;   INR: 1.06 ratio         PTT - ( 03 Jan 2022 07:12 )  PTT:30.8 sec      Assessment and plan  ---------------------------  87 yo F from [facility], CVA with residual left weakness (~3 years ago), PEG for dysphagia, hypothyroid, COPD (on no home Oxygen), HTN, gout, p/w fever, Pt has respiratory distress, wheezing, concerning for respiratory infection, otherwise no other symptoms is reported on the chart. EMS gave solumedrol 125 through peripheral, small iv line.  pt is well known to me with hx of htn, ashd, s/p MI, cva with increasing sob on bipap in er.  can not get any hx from the pt.  sob ?respiratory failure ?sec to pneumoniae  ?pleural effusion, check ct chest noted  continue bp meds  dvt prophylaxis  abx  will consider to possible repeat echo  duoneb  dvt prophylaxis  ct scan/ surgery/ ID noted  continue amlodipine and Metoprolol for now  tachycardia sec to underlying condition, continue to observe  oob to chair as tolerated  IR, continue abx/ surgery noted  incentive spirometry  replete K  abx as per ID  repeat blood cultures, negative  increase metoprolol to 50 mg bid sec to increase bp observe closely  esdras , high risk sec to sleep apnea and her overall condition, agree with empiric therapy  will repeat  TTE in 6 weeks  decrease Norvasc to 5 mg daily, bp is better controlled, if pt can not take po will switch meds to IV  awaiting peg today

## 2022-01-03 NOTE — PROCEDURE NOTE - NSINFORMCONSENT_GEN_A_CORE
Son/Benefits, risks, and possible complications of procedure explained to patient/caregiver who verbalized understanding and gave verbal consent.
Benefits, risks, and possible complications of procedure explained to patient/caregiver who verbalized understanding and gave verbal consent.
Benefits, risks, and possible complications of procedure explained to patient/caregiver who verbalized understanding and gave verbal consent.
son/Benefits, risks, and possible complications of procedure explained to patient/caregiver who verbalized understanding and gave verbal consent.

## 2022-01-04 NOTE — PROGRESS NOTE ADULT - SUBJECTIVE AND OBJECTIVE BOX
CARDIOLOGY     PROGRESS  NOTE   ________________________________________________    CHIEF COMPLAINT:Patient is a 86y old  Female who presents with a chief complaint of fevers/sob (03 Jan 2022 10:02)  no complain.  	  REVIEW OF SYSTEMS:  CONSTITUTIONAL: No fever, weight loss, or fatigue  EYES: No eye pain, visual disturbances, or discharge  ENT:  No difficulty hearing, tinnitus, vertigo; No sinus or throat pain  NECK: No pain or stiffness  RESPIRATORY: No cough, wheezing, chills or hemoptysis; No Shortness of Breath  CARDIOVASCULAR: No chest pain, palpitations, passing out, dizziness, or leg swelling  GASTROINTESTINAL: No abdominal or epigastric pain. No nausea, vomiting, or hematemesis; No diarrhea or constipation. No melena or hematochezia.  GENITOURINARY: No dysuria, frequency, hematuria, or incontinence  NEUROLOGICAL: No headaches, memory loss, loss of strength, numbness, or tremors  SKIN: No itching, burning, rashes, or lesions   LYMPH Nodes: No enlarged glands  ENDOCRINE: No heat or cold intolerance; No hair loss  MUSCULOSKELETAL: No joint pain or swelling; No muscle, back, or extremity pain  PSYCHIATRIC: No depression, anxiety, mood swings, or difficulty sleeping  HEME/LYMPH: No easy bruising, or bleeding gums  ALLERGY AND IMMUNOLOGIC: No hives or eczema	    [ ] All others negative	  [ ] Unable to obtain    PHYSICAL EXAM:  T(C): 36.6 (01-04-22 @ 03:36), Max: 36.6 (01-04-22 @ 03:36)  HR: 99 (01-04-22 @ 03:36) (83 - 104)  BP: 138/79 (01-04-22 @ 03:36) (105/75 - 143/65)  RR: 18 (01-04-22 @ 03:36) (18 - 18)  SpO2: 92% (01-04-22 @ 03:36) (92% - 94%)  Wt(kg): --  I&O's Summary    03 Jan 2022 07:01  -  04 Jan 2022 07:00  --------------------------------------------------------  IN: 0 mL / OUT: 1500 mL / NET: -1500 mL        Appearance: Normal	  HEENT:   Normal oral mucosa, PERRL, EOMI	  Lymphatic: No lymphadenopathy  Cardiovascular: Normal S1 S2, No JVD,+ murmurs, No edema  Respiratory: Lungs clear to auscultation	  Psychiatry: A & O x 3, Mood & affect appropriate  Gastrointestinal:  Soft, Non-tender, + BS	  Skin: No rashes, No ecchymoses, No cyanosis	  Neurologic: Non-focal  Extremities: Normal range of motion, No clubbing, cyanosis or edema  Vascular: Peripheral pulses palpable 2+ bilaterally    MEDICATIONS  (STANDING):  allopurinol 100 milliGRAM(s) Oral daily  artificial  tears Solution 1 Drop(s) Both EYES two times a day  atorvastatin 20 milliGRAM(s) Oral at bedtime  chlorhexidine 4% Liquid 1 Application(s) Topical <User Schedule>  dextrose 40% Gel 15 Gram(s) Oral once  dextrose 5% + sodium chloride 0.9%. 1000 milliLiter(s) (50 mL/Hr) IV Continuous <Continuous>  dextrose 5%. 1000 milliLiter(s) (50 mL/Hr) IV Continuous <Continuous>  dextrose 5%. 1000 milliLiter(s) (100 mL/Hr) IV Continuous <Continuous>  dextrose 50% Injectable 25 Gram(s) IV Push once  dextrose 50% Injectable 12.5 Gram(s) IV Push once  dextrose 50% Injectable 25 Gram(s) IV Push once  ertapenem  IVPB 1000 milliGRAM(s) IV Intermittent every 24 hours  glucagon  Injectable 1 milliGRAM(s) IntraMuscular once  heparin   Injectable 5000 Unit(s) SubCutaneous every 8 hours  insulin lispro (ADMELOG) corrective regimen sliding scale   SubCutaneous every 6 hours  levETIRAcetam  IVPB 750 milliGRAM(s) IV Intermittent every 12 hours  levothyroxine Injectable 35 MICROGram(s) IV Push at bedtime  metoprolol tartrate Injectable 5 milliGRAM(s) IV Push every 6 hours  nystatin Cream 1 Application(s) Topical two times a day  vancomycin  IVPB 1000 milliGRAM(s) IV Intermittent every 24 hours      TELEMETRY: 	    ECG:  	  RADIOLOGY:  OTHER: 	  	  LABS:	 	    CARDIAC MARKERS:                                9.3    5.38  )-----------( 266      ( 04 Jan 2022 07:05 )             30.7     01-03    137  |  103  |  10  ----------------------------<  157<H>  4.1   |  21<L>  |  0.56    Ca    8.7      03 Jan 2022 07:10      proBNP: Serum Pro-Brain Natriuretic Peptide: 375 pg/mL (12-19 @ 08:45)    Lipid Profile:   HgA1c:   TSH:   PT/INR - ( 03 Jan 2022 07:12 )   PT: 12.7 sec;   INR: 1.06 ratio         PTT - ( 03 Jan 2022 07:12 )  PTT:30.8 sec      Assessment and plan  ---------------------------  87 yo F from [facility], CVA with residual left weakness (~3 years ago), PEG for dysphagia, hypothyroid, COPD (on no home Oxygen), HTN, gout, p/w fever, Pt has respiratory distress, wheezing, concerning for respiratory infection, otherwise no other symptoms is reported on the chart. EMS gave solumedrol 125 through peripheral, small iv line.  pt is well known to me with hx of htn, ashd, s/p MI, cva with increasing sob on bipap in er.  can not get any hx from the pt.  sob ?respiratory failure ?sec to pneumoniae  ?pleural effusion, check ct chest noted  continue bp meds  dvt prophylaxis  abx  will consider to possible repeat echo  duoneb  dvt prophylaxis  ct scan/ surgery/ ID noted  continue amlodipine and Metoprolol for now  tachycardia sec to underlying condition, continue to observe  oob to chair as tolerated  IR, continue abx/ surgery noted  incentive spirometry  replete K  abx as per ID  repeat blood cultures, negative  esdras , high risk sec to sleep apnea and her overall condition, agree with empiric therapy  will repeat  TTE in 6 weeks  on iv beta blocker for bp/hr control

## 2022-01-04 NOTE — PROGRESS NOTE ADULT - ASSESSMENT
86   year old female      h/o hemorrhagic CVA, has  PEG,  HTN, MI,      HLD, gout   right Ca breast. s/p mastectomy in 2019,  s/p  sentinel node  localization.  4/4,  were  negative         *  p/w SOB and  acute respiratory distress,  was  on   bipap  in er   with  elevated wbc of 17,000 on arrival,  from pna/  probable aspiration/ gram negative pna  cxr,? pl effusion, right  lung opacit   *   s/p cva, has  PEG,  npo/,  on  synthroid, Keppra  ,  *  HTN, on meds  per  card  prior  echo,  normal ef  *  h/o ca breast. /, s/p  R  mastectomy  *  bacteremia. / staph epi, meth R  ct  c/a/p, ?  pna, perforated  appendicitis,  ?  mets  to  acetabulum   surg/ IR  and  oncology eval dr pacheco    appreciate  excellent  care of  house  staff  per  surg, no  intervention  *   sepsis  on  arrival, is  resolving  from  perforated  appendix/ and  Staph epi  bacteremia which is  persistent,  hence  cannot  dismiss  it as  a contaminant    and  also, with  echo, vegetation on  aortic  valve/ endocarditis, ?  esdras,  may  not  change   rx  plan,  will need  extended  course  of  ab   / will defer to card    was on iv vanco, . Cefepime   and flagyl   rpt ct  noted,  RLL  phlegmon,  and  80  cc of  pus  drained by  IR .  c/s  with  ecoli, e coccus  and  sophia  pe r card , pt high risk for esdras  and given that . this will not alter rx. pt  will need   extended  course  of ab   elevated lfts , noted, are normal  picc  line   by IR   done  on iv  vanco and ertapenem/ follow vanco level  peg dislodged.  IR   replacements  on 1/3  switch to oral mds   start  d/c  planning/ with picc  and iv ab/  team awrae       rd< from: Xray Chest 1 View- PORTABLE-Urgent (12.19.21 @ 09:20) >  IMPRESSION:  Low lung volumes. New small left lower lung hazy opacity may represent   atelectasis versus pleural effusion. Redemonstration of right upper lung   perihilar opacity largely unchanged prior exam.  --- End of Report --  < end of copied text >             86   year old female      h/o hemorrhagic CVA, has  PEG,  HTN, MI,      HLD, gout   right Ca breast. s/p mastectomy in 2019,  s/p  sentinel node  localization.  4/4,  were  negative         *  p/w SOB and  acute respiratory distress,  was  on   bipap  in er   with  elevated wbc of 17,000 on arrival,  from pna/  probable aspiration/ gram negative pna  cxr,? pl effusion, right  lung opacit   *   s/p cva, has  PEG,  npo/,  on  synthroid, Keppra  ,  *  HTN, on meds  per  card  prior  echo,  normal ef  *  h/o ca breast. /, s/p  R  mastectomy  *  bacteremia. / staph epi, meth R  ct  c/a/p, ?  pna, perforated  appendicitis,  ?  mets  to  acetabulum   surg/ IR  and  oncology eval dr pacheco    appreciate  excellent  care of  house  staff  per  surg, no  intervention  *   sepsis  on  arrival, is  resolving  from  perforated  appendix/ and  Staph epi  bacteremia which is  persistent,  hence  cannot  dismiss  it as  a contaminant    and  also, with  echo, vegetation on  aortic  valve/ endocarditis, ?  esdras,  may  not  change   rx  plan,  will need  extended  course  of  ab   / will defer to card    was on iv vanco, . Cefepime   and flagyl   rpt ct  noted,  RLL  phlegmon,  and  80  cc of  pus  drained by  IR .  c/s  with  ecoli, e coccus  and  sophia  pe r card , pt high risk for esdras  and given that . this will not alter rx. pt  will need   extended  course  of ab   elevated lfts , noted, are normal  picc  line   by IR   done  on iv  vanco and ertapenem/ follow vanco level  peg dislodged.  IR   replacements  on 1/3  switch to oral mds   start  d/c  planning/ with picc  and iv ab/  team awrae  daughter  aware       rd< from: Xray Chest 1 View- PORTABLE-Urgent (12.19.21 @ 09:20) >  IMPRESSION:  Low lung volumes. New small left lower lung hazy opacity may represent   atelectasis versus pleural effusion. Redemonstration of right upper lung   perihilar opacity largely unchanged prior exam.  --- End of Report --  < end of copied text >             86   year old female      h/o hemorrhagic CVA, has  PEG,  HTN, MI,      HLD, gout   right Ca breast. s/p mastectomy in 2019,  s/p  sentinel node  localization.  4/4,  were  negative         *  p/w SOB and  acute respiratory distress,  was  on   bipap  in er   with  elevated wbc of 17,000 on arrival,  from pna/  probable aspiration/ gram negative pna  cxr,? pl effusion, right  lung opacit   *   s/p cva, has  PEG,  npo/,  on  synthroid, Keppra  ,  *  HTN, on meds  per  card  prior  echo,  normal ef  *  h/o ca breast. /, s/p  R  mastectomy  *  bacteremia. / staph epi, meth R  ct  c/a/p, ?  pna, perforated  appendicitis,  ?  mets  to  acetabulum   surg/ IR  and  oncology eval dr pacheco    appreciate  excellent  care of  house  staff  per  surg, no  intervention  *   sepsis  on  arrival, is  resolving  from  perforated  appendix/ and  Staph epi  bacteremia which is  persistent,  hence  cannot  dismiss  it as  a contaminant    and  also, with  echo, vegetation on  aortic  valve/ endocarditis, ?  esdras,  may  not  change   rx  plan,  will need  extended  course  of  ab   / will defer to card    was on iv vanco, . Cefepime   and flagyl   rpt ct  noted,  RLL  phlegmon,  and  80  cc of  pus  drained by  IR .  c/s  with  ecoli, e coccus  and  sophia  pe r card , pt high risk for esdras  and given that . this will not alter rx. pt  will need   extended  course  of ab   elevated lfts , noted, are normal  picc  line   by IR   done  on iv  vanco and ertapenem/ follow vanco level  peg dislodged.  IR   replacements  on 1/3  switch to oral mds   start  d/c  planning/ with picc  and iv ab  for 6 weeks, per  ID    team awrae  daughter  aware       rd< from: Xray Chest 1 View- PORTABLE-Urgent (12.19.21 @ 09:20) >  IMPRESSION:  Low lung volumes. New small left lower lung hazy opacity may represent   atelectasis versus pleural effusion. Redemonstration of right upper lung   perihilar opacity largely unchanged prior exam.  --- End of Report --  < end of copied text >

## 2022-01-04 NOTE — CHART NOTE - NSCHARTNOTEFT_GEN_A_CORE
Advanced Care Planning:  I have had goals of care discussion, advanced directive and code status with patient/family    and  in  coordinating   patient care.     all questions answered and expressed understanding of the plan.    ANIVAL HUDSON, PT IS FULL CODE Advanced Care Planning:  I have had goals of care discussion, advanced directive and code status with patient/family    and  in  coordinating   patient care.     all questions answered and expressed understanding of the plan.    Per   DAUGHTER, PT IS FULL CODE

## 2022-01-04 NOTE — PROGRESS NOTE ADULT - ASSESSMENT
86 yo F with CVA, PEG, COPD, presenting with fevers, SOB  Leukocytosis, fever  CT concerning for perforated appendicitis, atelectasis, pancreatic lesion  BCX with CoNS--no apparent cardiac device (note L shoulder hardware in place)--3/4  Generally stable to improved today    1) Perforated appendicitis  Abscess in setting suspected perforated appendix  s/p IR drainage 12/23 - multiple organisms on culture  --DC Cefepime/metronidazole   --Invanz 1 gm iv q24  --cont vancomycin  --tube check today  --drain per IR       2) Positive BCX, Therapeutic Drug Monitoring  BCx (12/19) with 3/4 Staph epi  BCx (12/21) with 2/4 Staph epi  BCx (12/24) with NGTD  TTE (12/21) with possible vegetation on the left or non-coronary cusp of the aortic valve  --lower Vancomycin 1gm IV Q24H  --Check weekly trough  --cbc, bun/creatinine, vanco trough once weekly while on abx   --Continue to monitor renal function and vancomycin levels to avoid nephrotoxicity / ototoxicity secondary to vancomycin  --Discussed with Cardiology- high risk for MIKALA. Will treat empirically for IE  --surveillance bcx post iv abx   --picc  --total 6 weeks abx - day 12 of 42 of abx     3) Leukocytosis, Fever  - Trend to normal  -better    John Fragoso  Attending Physician   Division of Infectious Disease  Pager #[phone]  Available on [employer] Teams also  After 5pm/weekend or no response, call #[phone]

## 2022-01-04 NOTE — PROGRESS NOTE ADULT - SUBJECTIVE AND OBJECTIVE BOX
FRANKI MEHTA 86y [id number]    Patient is a 86y old  Female who presents with a chief complaint of fevers/sob (04 Jan 2022 09:37)      Follow Up/CC:  ID following for perforated appendicitis     Interval History/ROS: no fever    Allergies    Toradol (Urticaria (Mild to Mod); Rash (Mild to Mod))    Intolerances        ANTIMICROBIALS:  ertapenem  IVPB 1000 every 24 hours  vancomycin  IVPB 1000 every 24 hours      MEDICATIONS  (STANDING):  allopurinol 100 milliGRAM(s) Oral daily  artificial  tears Solution 1 Drop(s) Both EYES two times a day  atorvastatin 20 milliGRAM(s) Oral at bedtime  chlorhexidine 4% Liquid 1 Application(s) Topical <User Schedule>  dextrose 40% Gel 15 Gram(s) Oral once  dextrose 5% + sodium chloride 0.9%. 1000 milliLiter(s) (50 mL/Hr) IV Continuous <Continuous>  dextrose 5%. 1000 milliLiter(s) (50 mL/Hr) IV Continuous <Continuous>  dextrose 5%. 1000 milliLiter(s) (100 mL/Hr) IV Continuous <Continuous>  dextrose 50% Injectable 25 Gram(s) IV Push once  dextrose 50% Injectable 12.5 Gram(s) IV Push once  dextrose 50% Injectable 25 Gram(s) IV Push once  ertapenem  IVPB 1000 milliGRAM(s) IV Intermittent every 24 hours  glucagon  Injectable 1 milliGRAM(s) IntraMuscular once  heparin   Injectable 5000 Unit(s) SubCutaneous every 8 hours  insulin lispro (ADMELOG) corrective regimen sliding scale   SubCutaneous every 6 hours  levETIRAcetam  Solution 750 milliGRAM(s) Oral two times a day  metoprolol tartrate 50 milliGRAM(s) Oral two times a day  nystatin Cream 1 Application(s) Topical two times a day  vancomycin  IVPB 1000 milliGRAM(s) IV Intermittent every 24 hours    MEDICATIONS  (PRN):  acetaminophen     Tablet .. 650 milliGRAM(s) Oral every 6 hours PRN Temp greater or equal to 38C (100.4F), Mild Pain (1 - 3)  melatonin 3 milliGRAM(s) Oral at bedtime PRN Insomnia  sodium chloride 0.9% lock flush 10 milliLiter(s) IV Push every 1 hour PRN Pre/post blood products, medications, blood draw, and to maintain line patency        Vital Signs Last 24 Hrs  T(C): 36.7 (04 Jan 2022 13:46), Max: 37 (04 Jan 2022 09:27)  T(F): 98 (04 Jan 2022 13:46), Max: 98.6 (04 Jan 2022 09:27)  HR: 104 (04 Jan 2022 13:46) (83 - 104)  BP: 107/70 (04 Jan 2022 13:46) (105/75 - 143/65)  BP(mean): --  RR: 18 (04 Jan 2022 13:46) (18 - 18)  SpO2: 92% (04 Jan 2022 13:46) (92% - 96%)    CBC Full  -  ( 04 Jan 2022 07:05 )  WBC Count : 5.38 K/uL  RBC Count : 2.89 M/uL  Hemoglobin : 9.3 g/dL  Hematocrit : 30.7 %  Platelet Count - Automated : 266 K/uL  Mean Cell Volume : 106.2 fl  Mean Cell Hemoglobin : 32.2 pg  Mean Cell Hemoglobin Concentration : 30.3 gm/dL  Auto Neutrophil # : x  Auto Lymphocyte # : x  Auto Monocyte # : x  Auto Eosinophil # : x  Auto Basophil # : x  Auto Neutrophil % : x  Auto Lymphocyte % : x  Auto Monocyte % : x  Auto Eosinophil % : x  Auto Basophil % : x    01-03    137  |  103  |  10  ----------------------------<  157<H>  4.1   |  21<L>  |  0.56    Ca    8.7      03 Jan 2022 07:10            MICROBIOLOGY:  .Blood Blood-Venous  12-24-21   No Growth Final  --  --      .Blood Blood-Peripheral  12-24-21   No Growth Final  --  --      .Abscess abdominal abscess  12-23-21   Culture yields >4 types of aerobic and/or anaerobic bacteria  Call client services within 7 days if further workup is clinically  indicated. Culture includes  Few Escherichia coli  Few Morganella morganii  Moderate Enterococcus avium  Numerous Bacteroides thetaiotamcron group "Susceptibilities not performed"  Numerous Clostridium ramosum "Susceptibilities not performed"  --  Escherichia coli  Morganella morganii  Enterococcus avium      .Blood Blood-Peripheral  12-21-21   Growth in aerobic bottle: Staphylococcus epidermidis "Susceptibilities  not performed"  --  Blood Culture PCR      Clean Catch Clean Catch (Midstream)  12-19-21   <10,000 CFU/mL Normal Urogenital Regina  --  --      .Blood Blood  12-19-21   Growth in aerobic and anaerobic bottles: Staphylococcus epidermidis Coag  Negative Staphylococcus  Single set isolate, possible contaminant. Contact  Microbiology if susceptibility testing clinically  indicated.  ***Blood Panel PCR results on this specimen are available  approximately 3 hours after the Gram stain result.***  Gram stain, PCR, and/or culture results may not always  correspond due to difference in methodologies.  ************************************************************  This PCR assay was performed by multiplex PCR. This  Assay tests for 66 bacterial and resistance gene targets.  Please refer to the [facility] test directory  at [email or web address] for details.  --  Blood Culture PCR              v            RADIOLOGY

## 2022-01-04 NOTE — PROGRESS NOTE ADULT - SUBJECTIVE AND OBJECTIVE BOX
85 yo F from [facility], CVA with residual left weakness (~3 years ago), PEG for dysphagia, hypothyroid, COPD (on no home Oxygen), HTN, gout, admitted 12/19/21 with fever and SOB  She had right simple mastectomy in 4/2019.  Any Rx after that is unknown. 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 650 milliGRAM(s) Oral every 6 hours PRN Temp greater or equal to 38C (100.4F), Mild Pain (1 - 3)  allopurinol 100 milliGRAM(s) Oral daily  artificial  tears Solution 1 Drop(s) Both EYES two times a day  atorvastatin 20 milliGRAM(s) Oral at bedtime  chlorhexidine 4% Liquid 1 Application(s) Topical <User Schedule>  dextrose 40% Gel 15 Gram(s) Oral once  dextrose 5% + sodium chloride 0.9%. 1000 milliLiter(s) IV Continuous <Continuous>  dextrose 5%. 1000 milliLiter(s) IV Continuous <Continuous>  dextrose 5%. 1000 milliLiter(s) IV Continuous <Continuous>  dextrose 50% Injectable 25 Gram(s) IV Push once  dextrose 50% Injectable 12.5 Gram(s) IV Push once  dextrose 50% Injectable 25 Gram(s) IV Push once  ertapenem  IVPB 1000 milliGRAM(s) IV Intermittent every 24 hours  glucagon  Injectable 1 milliGRAM(s) IntraMuscular once  heparin   Injectable 5000 Unit(s) SubCutaneous every 8 hours  insulin lispro (ADMELOG) corrective regimen sliding scale   SubCutaneous every 6 hours  levETIRAcetam  Solution 750 milliGRAM(s) Oral two times a day  melatonin 3 milliGRAM(s) Oral at bedtime PRN Insomnia  metoprolol tartrate 50 milliGRAM(s) Oral two times a day  nystatin Cream 1 Application(s) Topical two times a day  sodium chloride 0.9% lock flush 10 milliLiter(s) IV Push every 1 hour PRN Pre/post blood products, medications, blood draw, and to maintain line patency  vancomycin  IVPB 1000 milliGRAM(s) IV Intermittent every 24 hours    Labs:  CBC Full  -  ( 04 Jan 2022 07:05 )  WBC Count : 5.38 K/uL  Hemoglobin : 9.3 g/dL  Hematocrit : 30.7 %  Platelet Count - Automated : 266 K/uL  Mean Cell Volume : 106.2 fl  Mean Cell Hemoglobin : 32.2 pg  Mean Cell Hemoglobin Concentration : 30.3 gm/dL  Auto Neutrophil # : x  Auto Lymphocyte # : x  Auto Monocyte # : x  Auto Eosinophil # : x  Auto Basophil # : x  Auto Neutrophil % : x  Auto Lymphocyte % : x  Auto Monocyte % : x  Auto Eosinophil % : x  Auto Basophil % : x    01-03    137  |  103  |  10  ----------------------------<  157<H>  4.1   |  21<L>  |  0.56    Ca    8.7      03 Jan 2022 07:10        Radiology:   < from: CT Abdomen and Pelvis No Cont (12.30.21 @ 14:12) >  IMPRESSION:  Interval decrease in size of right lower quadrant fluid collection,   status post placement of drainage catheter.    Complete left lower lobe atelectasis with secretions/debris in the   airways again noted.    Redemonstration of an indeterminant lytic lesion in the right acetabulum.      < end of copied text >            ROS:  Patient comfortable without distress  No SOB or chest pain  No palpitation  No abdominal pain, diarrhaea or constipation  No weakness of extremities  No skin changes or swelling of legs    Vital Signs Last 24 Hrs  T(C): 36.6 (04 Jan 2022 17:50), Max: 37 (04 Jan 2022 09:27)  T(F): 97.8 (04 Jan 2022 17:50), Max: 98.6 (04 Jan 2022 09:27)  HR: 102 (04 Jan 2022 17:50) (83 - 104)  BP: 100/64 (04 Jan 2022 17:50) (100/64 - 143/65)  BP(mean): --  RR: 18 (04 Jan 2022 13:46) (18 - 18)  SpO2: 92% (04 Jan 2022 13:46) (92% - 96%)    Physical exam:  Obese  No distress  CVS: S1, S2   Chest: bilateral breath sound without rales  Abdomen: soft, not tender, no organomegaly or masses. PEG  No focal neuro deficit  No edema      Assessment and Plan:

## 2022-01-04 NOTE — PROGRESS NOTE ADULT - ASSESSMENT
87 yo F from [facility], CVA with residual left weakness (~3 years ago), PEG for dysphagia, hypothyroid, COPD (on no home Oxygen), HTN, gout, admitted 12/19/21 with fever and SOB  She had right simple mastectomy in 4/2019.  Any Rx after that is unknown. CT noted for acetabular lesion and pancreatic IPMN  She had perforated appendix with collection, been managed conservatively, is on abx with ID input    Patient is s/p right simple mastectomy in 4/2019 for right breast [id number] breast cancer, ER, [place] positive and Her-2 negative.   She would ideally have received adjuvant hormonal therapy.  Eventually can get bone scan and further imaging of hip as needed.   If looks metastatic will use hormonal therapy thru PEG. Spo

## 2022-01-04 NOTE — PROGRESS NOTE ADULT - SUBJECTIVE AND OBJECTIVE BOX
afebrile  REVIEW OF SYSTEMS:  GEN: no fever,    no chills  RESP: no SOB,   no cough  CVS: no chest pain,   no palpitations  GI: no abdominal pain,   no nausea,   no vomiting,   no constipation,   no diarrhea  : no dysuria,   no frequency  NEURO: no headache,   no dizziness  PSYCH: no depression,   not anxious  Derm : no rash    MEDICATIONS  (STANDING):  allopurinol 100 milliGRAM(s) Oral daily  artificial  tears Solution 1 Drop(s) Both EYES two times a day  atorvastatin 20 milliGRAM(s) Oral at bedtime  chlorhexidine 4% Liquid 1 Application(s) Topical <User Schedule>  dextrose 40% Gel 15 Gram(s) Oral once  dextrose 5% + sodium chloride 0.9%. 1000 milliLiter(s) (50 mL/Hr) IV Continuous <Continuous>  dextrose 5%. 1000 milliLiter(s) (50 mL/Hr) IV Continuous <Continuous>  dextrose 5%. 1000 milliLiter(s) (100 mL/Hr) IV Continuous <Continuous>  dextrose 50% Injectable 25 Gram(s) IV Push once  dextrose 50% Injectable 12.5 Gram(s) IV Push once  dextrose 50% Injectable 25 Gram(s) IV Push once  ertapenem  IVPB 1000 milliGRAM(s) IV Intermittent every 24 hours  glucagon  Injectable 1 milliGRAM(s) IntraMuscular once  heparin   Injectable 5000 Unit(s) SubCutaneous every 8 hours  insulin lispro (ADMELOG) corrective regimen sliding scale   SubCutaneous every 6 hours  levETIRAcetam  IVPB 750 milliGRAM(s) IV Intermittent every 12 hours  levothyroxine Injectable 35 MICROGram(s) IV Push at bedtime  metoprolol tartrate Injectable 5 milliGRAM(s) IV Push every 6 hours  nystatin Cream 1 Application(s) Topical two times a day  vancomycin  IVPB 1000 milliGRAM(s) IV Intermittent every 24 hours    MEDICATIONS  (PRN):  acetaminophen     Tablet .. 650 milliGRAM(s) Oral every 6 hours PRN Temp greater or equal to 38C (100.4F), Mild Pain (1 - 3)  melatonin 3 milliGRAM(s) Oral at bedtime PRN Insomnia  sodium chloride 0.9% lock flush 10 milliLiter(s) IV Push every 1 hour PRN Pre/post blood products, medications, blood draw, and to maintain line patency      Vital Signs Last 24 Hrs  T(C): 37 (04 Jan 2022 09:27), Max: 37 (04 Jan 2022 09:27)  T(F): 98.6 (04 Jan 2022 09:27), Max: 98.6 (04 Jan 2022 09:27)  HR: 96 (04 Jan 2022 09:27) (83 - 104)  BP: 95/55 (04 Jan 2022 09:27) (95/55 - 143/65)  BP(mean): --  RR: 18 (04 Jan 2022 09:27) (18 - 18)  SpO2: 96% (04 Jan 2022 09:27) (92% - 96%)  CAPILLARY BLOOD GLUCOSE      POCT Blood Glucose.: 153 mg/dL (04 Jan 2022 06:04)  POCT Blood Glucose.: 135 mg/dL (04 Jan 2022 00:50)  POCT Blood Glucose.: 126 mg/dL (03 Jan 2022 17:44)  POCT Blood Glucose.: 147 mg/dL (03 Jan 2022 11:51)    I&O's Summary    03 Jan 2022 07:01  -  04 Jan 2022 07:00  --------------------------------------------------------  IN: 0 mL / OUT: 1500 mL / NET: -1500 mL        PHYSICAL EXAM:  HEAD:  Atraumatic, Normocephalic  NECK: Supple, No   JVD  CHEST/LUNG:   no     rales,     no,    rhonchi  HEART: Regular rate and rhythm;         murmur  ABDOMEN: Soft, Nontender, ;   EXTREMITIES:    no    edema  NEUROLOGY:  alert    LABS:                        9.3    5.38  )-----------( 266      ( 04 Jan 2022 07:05 )             30.7     01-03    137  |  103  |  10  ----------------------------<  157<H>  4.1   |  21<L>  |  0.56    Ca    8.7      03 Jan 2022 07:10      PT/INR - ( 03 Jan 2022 07:12 )   PT: 12.7 sec;   INR: 1.06 ratio         PTT - ( 03 Jan 2022 07:12 )  PTT:30.8 sec                        Consultant(s) Notes Reviewed:      Care Discussed with Consultants/Other Providers:

## 2022-01-05 NOTE — PROGRESS NOTE ADULT - SUBJECTIVE AND OBJECTIVE BOX
afebrile    REVIEW OF SYSTEMS:  GEN: no fever,    no chills  RESP: no SOB,   no cough  CVS: no chest pain,   no palpitations  GI: no abdominal pain,   no nausea,   no vomiting,   no constipation,   no diarrhea  : no dysuria,   no frequency  NEURO: no headache,   no dizziness  PSYCH: no depression,   not anxious  Derm : no rash    MEDICATIONS  (STANDING):  acetylcysteine 20%  Inhalation 4 milliLiter(s) Inhalation three times a day  albuterol/ipratropium for Nebulization 3 milliLiter(s) Nebulizer every 6 hours  allopurinol 100 milliGRAM(s) Oral daily  artificial  tears Solution 1 Drop(s) Both EYES two times a day  atorvastatin 20 milliGRAM(s) Oral at bedtime  chlorhexidine 4% Liquid 1 Application(s) Topical <User Schedule>  dextrose 40% Gel 15 Gram(s) Oral once  dextrose 5% + sodium chloride 0.9%. 1000 milliLiter(s) (50 mL/Hr) IV Continuous <Continuous>  dextrose 5%. 1000 milliLiter(s) (50 mL/Hr) IV Continuous <Continuous>  dextrose 5%. 1000 milliLiter(s) (100 mL/Hr) IV Continuous <Continuous>  dextrose 50% Injectable 25 Gram(s) IV Push once  dextrose 50% Injectable 12.5 Gram(s) IV Push once  dextrose 50% Injectable 25 Gram(s) IV Push once  ertapenem  IVPB 1000 milliGRAM(s) IV Intermittent every 24 hours  glucagon  Injectable 1 milliGRAM(s) IntraMuscular once  heparin   Injectable 5000 Unit(s) SubCutaneous every 8 hours  insulin lispro (ADMELOG) corrective regimen sliding scale   SubCutaneous every 6 hours  levETIRAcetam  Solution 750 milliGRAM(s) Oral two times a day  levothyroxine 75 MICROGram(s) Oral daily  metoprolol tartrate 50 milliGRAM(s) Oral two times a day  nystatin Cream 1 Application(s) Topical two times a day  sodium chloride 3%  Inhalation 4 milliLiter(s) Inhalation every 6 hours  vancomycin  IVPB 1000 milliGRAM(s) IV Intermittent every 24 hours    MEDICATIONS  (PRN):  acetaminophen     Tablet .. 650 milliGRAM(s) Oral every 6 hours PRN Temp greater or equal to 38C (100.4F), Mild Pain (1 - 3)  melatonin 3 milliGRAM(s) Oral at bedtime PRN Insomnia  sodium chloride 0.9% lock flush 10 milliLiter(s) IV Push every 1 hour PRN Pre/post blood products, medications, blood draw, and to maintain line patency      Vital Signs Last 24 Hrs  T(C): 37.2 (05 Jan 2022 05:00), Max: 37.4 (04 Jan 2022 21:30)  T(F): 98.9 (05 Jan 2022 05:00), Max: 99.4 (04 Jan 2022 21:30)  HR: 89 (05 Jan 2022 06:54) (83 - 130)  BP: 121/90 (05 Jan 2022 05:00) (96/62 - 124/70)  BP(mean): --  RR: 20 (05 Jan 2022 05:00) (18 - 24)  SpO2: 94% (05 Jan 2022 06:54) (83% - 96%)  CAPILLARY BLOOD GLUCOSE      POCT Blood Glucose.: 147 mg/dL (05 Jan 2022 06:31)  POCT Blood Glucose.: 165 mg/dL (04 Jan 2022 23:39)  POCT Blood Glucose.: 108 mg/dL (04 Jan 2022 18:00)  POCT Blood Glucose.: 143 mg/dL (04 Jan 2022 12:00)    I&O's Summary    04 Jan 2022 07:01  -  05 Jan 2022 07:00  --------------------------------------------------------  IN: 0 mL / OUT: 900 mL / NET: -900 mL        PHYSICAL EXAM:  HEAD:  Atraumatic, Normocephalic  NECK: Supple, No   JVD  CHEST/LUNG:   no     rales,     no,    rhonchi  HEART: Regular rate and rhythm;         murmur  ABDOMEN: Soft, Nontender, ;   EXTREMITIES:    no    edema  NEUROLOGY:  alert    LABS:                        8.7    5.05  )-----------( 277      ( 05 Jan 2022 07:28 )             29.3     01-05    139  |  105  |  10  ----------------------------<  161<H>  4.0   |  17<L>  |  0.62    Ca    9.4      05 Jan 2022 00:49  Phos  4.1     01-05  Mg     1.9     01-05    TPro  6.8  /  Alb  3.5  /  TBili  0.2  /  DBili  x   /  AST  14  /  ALT  26  /  AlkPhos  88  01-05              ABG - ( 05 Jan 2022 00:27 )  pH, Arterial: 7.39  pH, Blood: x     /  pCO2: 34    /  pO2: 75    / HCO3: 21    / Base Excess: -3.8  /  SaO2: 96.7              01-05 @ 06:53  4.1  45              Consultant(s) Notes Reviewed:      Care Discussed with Consultants/Other Providers:

## 2022-01-05 NOTE — PROGRESS NOTE ADULT - ASSESSMENT
87 yo F from [facility], CVA with residual left weakness (~3 years ago), PEG for dysphagia, hypothyroid, COPD (on no home Oxygen), HTN, gout, admitted 12/19/21 with fever and SOB  She had right simple mastectomy in 4/2019.  Any Rx after that is unknown. CT noted for acetabular lesion and pancreatic IPMN  She had perforated appendix with collection, been managed conservatively, is on abx with ID input    Patient is s/p right simple mastectomy in 4/2019 for right breast [id number] breast cancer, ER, [place] positive and Her-2 negative.   She would ideally have received adjuvant hormonal therapy.  Eventually can get bone scan and further imaging of hip as needed.   If looks metastatic will use hormonal therapy thru PEG.   However at present acute issues including respiratory are being taken care off

## 2022-01-05 NOTE — PROGRESS NOTE ADULT - ASSESSMENT
86 yo F with CVA, PEG, COPD, presenting with fevers, SOB  Leukocytosis, fever  CT concerning for perforated appendicitis, atelectasis, pancreatic lesion  BCX with CoNS--no apparent cardiac device (note L shoulder hardware in place)--3/4  Generally stable to improved today    1) Perforated appendicitis  Abscess in setting suspected perforated appendix  s/p IR drainage 12/23 - multiple organisms on culture  --Invanz 1 gm iv q24  --cont vancomycin  --drain per IR       2) Positive BCX, Therapeutic Drug Monitoring  BCx (12/19) with 3/4 Staph epi  BCx (12/21) with 2/4 Staph epi  BCx (12/24) with NGTD  TTE (12/21) with possible vegetation on the left or non-coronary cusp of the aortic valve  --cont Vancomycin 1gm IV Q24H  --Check weekly trough  --cbc, bun/creatinine, vanco trough once weekly while on abx   --Continue to monitor renal function and vancomycin levels to avoid nephrotoxicity / ototoxicity secondary to vancomycin  --Discussed with Cardiology- high risk for MIKALA. Will treat empirically for IE  --surveillance bcx post iv abx   --picc  --total 6 weeks abx - day 13 of 42 of abx     3) Leukocytosis, Fever  - Trend to normal  -better    4) Mucus plugging  --appreciate pulm input       John Fragoso  Attending Physician   Division of Infectious Disease  Pager #[phone]  Available on [employer] Teams also  After 5pm/weekend or no response, call #[phone]

## 2022-01-05 NOTE — PROGRESS NOTE ADULT - SUBJECTIVE AND OBJECTIVE BOX
FRANKI MEHTA 86y [id number]    Patient is a 86y old  Female who presents with a chief complaint of fevers/sob (05 Jan 2022 10:58)      Follow Up/CC:  ID following for abcteremia    Interval History/ROS: RRT o/n, mucus plugging issues     Allergies    Toradol (Urticaria (Mild to Mod); Rash (Mild to Mod))    Intolerances        ANTIMICROBIALS:  vancomycin  IVPB 1000 every 24 hours      MEDICATIONS  (STANDING):  acetylcysteine 20%  Inhalation 4 milliLiter(s) Inhalation three times a day  albuterol/ipratropium for Nebulization 3 milliLiter(s) Nebulizer every 6 hours  allopurinol 100 milliGRAM(s) Oral daily  artificial  tears Solution 1 Drop(s) Both EYES two times a day  artificial tears (preservative free) Ophthalmic Solution 1 Drop(s) Both EYES four times a day  atorvastatin 20 milliGRAM(s) Oral at bedtime  chlorhexidine 4% Liquid 1 Application(s) Topical <User Schedule>  dextrose 40% Gel 15 Gram(s) Oral once  dextrose 5% + sodium chloride 0.9%. 1000 milliLiter(s) (50 mL/Hr) IV Continuous <Continuous>  dextrose 5%. 1000 milliLiter(s) (50 mL/Hr) IV Continuous <Continuous>  dextrose 5%. 1000 milliLiter(s) (100 mL/Hr) IV Continuous <Continuous>  dextrose 50% Injectable 25 Gram(s) IV Push once  dextrose 50% Injectable 12.5 Gram(s) IV Push once  dextrose 50% Injectable 25 Gram(s) IV Push once  glucagon  Injectable 1 milliGRAM(s) IntraMuscular once  heparin   Injectable 5000 Unit(s) SubCutaneous every 8 hours  insulin lispro (ADMELOG) corrective regimen sliding scale   SubCutaneous every 6 hours  levETIRAcetam  Solution 750 milliGRAM(s) Oral two times a day  levothyroxine 75 MICROGram(s) Oral daily  metoprolol tartrate 50 milliGRAM(s) Oral two times a day  nystatin Cream 1 Application(s) Topical two times a day  sodium chloride 3%  Inhalation 4 milliLiter(s) Inhalation every 6 hours  vancomycin  IVPB 1000 milliGRAM(s) IV Intermittent every 24 hours    MEDICATIONS  (PRN):  acetaminophen     Tablet .. 650 milliGRAM(s) Oral every 6 hours PRN Temp greater or equal to 38C (100.4F), Mild Pain (1 - 3)  melatonin 3 milliGRAM(s) Oral at bedtime PRN Insomnia  sodium chloride 0.9% lock flush 10 milliLiter(s) IV Push every 1 hour PRN Pre/post blood products, medications, blood draw, and to maintain line patency        Vital Signs Last 24 Hrs  T(C): 36.5 (05 Jan 2022 16:01), Max: 37.4 (04 Jan 2022 21:30)  T(F): 97.7 (05 Jan 2022 16:01), Max: 99.4 (04 Jan 2022 21:30)  HR: 94 (05 Jan 2022 16:01) (89 - 130)  BP: 124/86 (05 Jan 2022 16:01) (96/62 - 141/69)  BP(mean): --  RR: 22 (05 Jan 2022 16:01) (20 - 24)  SpO2: 97% (05 Jan 2022 16:01) (83% - 99%)    CBC Full  -  ( 05 Jan 2022 07:28 )  WBC Count : 5.05 K/uL  RBC Count : 2.70 M/uL  Hemoglobin : 8.7 g/dL  Hematocrit : 29.3 %  Platelet Count - Automated : 277 K/uL  Mean Cell Volume : 108.5 fl  Mean Cell Hemoglobin : 32.2 pg  Mean Cell Hemoglobin Concentration : 29.7 gm/dL  Auto Neutrophil # : x  Auto Lymphocyte # : x  Auto Monocyte # : x  Auto Eosinophil # : x  Auto Basophil # : x  Auto Neutrophil % : x  Auto Lymphocyte % : x  Auto Monocyte % : x  Auto Eosinophil % : x  Auto Basophil % : x    01-05    144  |  107  |  10  ----------------------------<  147<H>  4.2   |  18<L>  |  0.62    Ca    9.3      05 Jan 2022 07:32  Phos  4.1     01-05  Mg     1.9     01-05    TPro  6.5  /  Alb  3.5  /  TBili  0.2  /  DBili  x   /  AST  14  /  ALT  22  /  AlkPhos  82  01-05    LIVER FUNCTIONS - ( 05 Jan 2022 07:32 )  Alb: 3.5 g/dL / Pro: 6.5 g/dL / ALK PHOS: 82 U/L / ALT: 22 U/L / AST: 14 U/L / GGT: x               MICROBIOLOGY:  .Blood Blood-Venous  12-24-21   No Growth Final  --  --      .Blood Blood-Peripheral  12-24-21   No Growth Final  --  --      .Abscess abdominal abscess  12-23-21   Culture yields >4 types of aerobic and/or anaerobic bacteria  Call client services within 7 days if further workup is clinically  indicated. Culture includes  Few Escherichia coli  Few Morganella morganii  Moderate Enterococcus avium  Numerous Bacteroides thetaiotamcron group "Susceptibilities not performed"  Numerous Clostridium ramosum "Susceptibilities not performed"  --  Escherichia coli  Morganella morganii  Enterococcus avium      .Blood Blood-Peripheral  12-21-21   Growth in aerobic bottle: Staphylococcus epidermidis "Susceptibilities  not performed"  --  Blood Culture PCR      Clean Catch Clean Catch (Midstream)  12-19-21   <10,000 CFU/mL Normal Urogenital Regina  --  --      .Blood Blood  12-19-21   Growth in aerobic and anaerobic bottles: Staphylococcus epidermidis Coag  Negative Staphylococcus  Single set isolate, possible contaminant. Contact  Microbiology if susceptibility testing clinically  indicated.  ***Blood Panel PCR results on this specimen are available  approximately 3 hours after the Gram stain result.***  Gram stain, PCR, and/or culture results may not always  correspond due to difference in methodologies.  ************************************************************  This PCR assay was performed by multiplex PCR. This  Assay tests for 66 bacterial and resistance gene targets.  Please refer to the [facility] test directory  at [email or web address] for details.  --  Blood Culture PCR        Vancomycin Level, Trough: 17.6 ug/mL (01-05-22 @ 14:25)      RADIOLOGY    < from: Xray Chest 1 View- PORTABLE-Urgent (Xray Chest 1 View- PORTABLE-Urgent .) (01.05.22 @ 06:52) >  Left lung collapse.    < end of copied text >

## 2022-01-05 NOTE — PROGRESS NOTE ADULT - SUBJECTIVE AND OBJECTIVE BOX
CARDIOLOGY     PROGRESS  NOTE   ________________________________________________    CHIEF COMPLAINT:Patient is a 86y old  Female who presents with a chief complaint of fevers/sob (05 Jan 2022 08:17)  events noted.  	  REVIEW OF SYSTEMS:  CONSTITUTIONAL: No fever, weight loss, or fatigue  EYES: No eye pain, visual disturbances, or discharge  ENT:  No difficulty hearing, tinnitus, vertigo; No sinus or throat pain  NECK: No pain or stiffness  RESPIRATORY: No cough, wheezing, chills or hemoptysis; No Shortness of Breath  CARDIOVASCULAR: No chest pain, palpitations, passing out, dizziness, or leg swelling  GASTROINTESTINAL: No abdominal or epigastric pain. No nausea, vomiting, or hematemesis; No diarrhea or constipation. No melena or hematochezia.  GENITOURINARY: No dysuria, frequency, hematuria, or incontinence  NEUROLOGICAL: No headaches, memory loss, loss of strength, numbness, or tremors  SKIN: No itching, burning, rashes, or lesions   LYMPH Nodes: No enlarged glands  ENDOCRINE: No heat or cold intolerance; No hair loss  MUSCULOSKELETAL: No joint pain or swelling; No muscle, back, or extremity pain  PSYCHIATRIC: No depression, anxiety, mood swings, or difficulty sleeping  HEME/LYMPH: No easy bruising, or bleeding gums  ALLERGY AND IMMUNOLOGIC: No hives or eczema	    [ ] All others negative	  [ ] Unable to obtain    PHYSICAL EXAM:  T(C): 37.2 (01-05-22 @ 05:00), Max: 37.4 (01-04-22 @ 21:30)  HR: 91 (01-05-22 @ 10:14) (89 - 130)  BP: 121/90 (01-05-22 @ 05:00) (96/62 - 124/70)  RR: 22 (01-05-22 @ 10:14) (18 - 24)  SpO2: 99% (01-05-22 @ 10:14) (83% - 99%)  Wt(kg): --  I&O's Summary    04 Jan 2022 07:01  -  05 Jan 2022 07:00  --------------------------------------------------------  IN: 0 mL / OUT: 900 mL / NET: -900 mL        Appearance: Normal	  HEENT:   Normal oral mucosa, PERRL, EOMI	  Lymphatic: No lymphadenopathy  Cardiovascular: Normal S1 S2, No JVD, No murmurs, No edema  Respiratory: Lungs clear to auscultation	  Psychiatry: A & O x 3, Mood & affect appropriate  Gastrointestinal:  Soft, Non-tender, + BS	  Skin: No rashes, No ecchymoses, No cyanosis	  Neurologic: Non-focal  Extremities: Normal range of motion, No clubbing, cyanosis or edema  Vascular: Peripheral pulses palpable 2+ bilaterally    MEDICATIONS  (STANDING):  acetylcysteine 20%  Inhalation 4 milliLiter(s) Inhalation three times a day  albuterol/ipratropium for Nebulization 3 milliLiter(s) Nebulizer every 6 hours  allopurinol 100 milliGRAM(s) Oral daily  artificial  tears Solution 1 Drop(s) Both EYES two times a day  atorvastatin 20 milliGRAM(s) Oral at bedtime  chlorhexidine 4% Liquid 1 Application(s) Topical <User Schedule>  dextrose 40% Gel 15 Gram(s) Oral once  dextrose 5% + sodium chloride 0.9%. 1000 milliLiter(s) (50 mL/Hr) IV Continuous <Continuous>  dextrose 5%. 1000 milliLiter(s) (50 mL/Hr) IV Continuous <Continuous>  dextrose 5%. 1000 milliLiter(s) (100 mL/Hr) IV Continuous <Continuous>  dextrose 50% Injectable 25 Gram(s) IV Push once  dextrose 50% Injectable 12.5 Gram(s) IV Push once  dextrose 50% Injectable 25 Gram(s) IV Push once  ertapenem  IVPB 1000 milliGRAM(s) IV Intermittent every 24 hours  glucagon  Injectable 1 milliGRAM(s) IntraMuscular once  heparin   Injectable 5000 Unit(s) SubCutaneous every 8 hours  insulin lispro (ADMELOG) corrective regimen sliding scale   SubCutaneous every 6 hours  levETIRAcetam  Solution 750 milliGRAM(s) Oral two times a day  levothyroxine 75 MICROGram(s) Oral daily  metoprolol tartrate 50 milliGRAM(s) Oral two times a day  nystatin Cream 1 Application(s) Topical two times a day  sodium chloride 3%  Inhalation 4 milliLiter(s) Inhalation every 6 hours  vancomycin  IVPB 1000 milliGRAM(s) IV Intermittent every 24 hours      TELEMETRY: 	    ECG:  	  RADIOLOGY:  OTHER: 	  	  LABS:	 	    CARDIAC MARKERS:                                8.7    5.05  )-----------( 277      ( 05 Jan 2022 07:28 )             29.3     01-05    144  |  107  |  10  ----------------------------<  147<H>  4.2   |  18<L>  |  0.62    Ca    9.3      05 Jan 2022 07:32  Phos  4.1     01-05  Mg     1.9     01-05    TPro  6.5  /  Alb  3.5  /  TBili  0.2  /  DBili  x   /  AST  14  /  ALT  22  /  AlkPhos  82  01-05    proBNP: Serum Pro-Brain Natriuretic Peptide: 375 pg/mL (12-19 @ 08:45)    Lipid Profile:   HgA1c:   TSH:   < from: Xray Chest 1 View- PORTABLE-Urgent (Xray Chest 1 View- PORTABLE-Urgent .) (01.05.22 @ 06:52) >  Airway machine/device over left chest and neck.  Heart size cannot be assessed in this projection.  White out of the left lung with tracheal deviation towards the left,   consistent with left lung collapse. The right lung is clear.  There is no pneumothorax or pleural effusion.    IMPRESSION:  Left lung collapse.        Assessment and plan  ---------------------------  85 yo F from [facility], CVA with residual left weakness (~3 years ago), PEG for dysphagia, hypothyroid, COPD (on no home Oxygen), HTN, gout, p/w fever, Pt has respiratory distress, wheezing, concerning for respiratory infection, otherwise no other symptoms is reported on the chart. EMS gave solumedrol 125 through peripheral, small iv line.  pt is well known to me with hx of htn, ashd, s/p MI, cva with increasing sob on bipap in er.  can not get any hx from the pt.  sob ?respiratory failure ?sec to pneumoniae  ?pleural effusion, check ct chest noted  continue bp meds  dvt prophylaxis  will consider to possible repeat echo  duoneb  dvt prophylaxis  ct scan/ surgery/ ID noted  continue amlodipine and Metoprolol for now  tachycardia sec to underlying condition, continue to observe  oob to chair as tolerated  IR, continue abx/ surgery noted  incentive spirometry  replete K  abx as per ID  repeat blood cultures, negative  esdras , high risk sec to sleep apnea and her overall condition, agree with empiric therapy  will repeat  TTE in 6 weeks  on iv beta blocker for bp/hr control  events noted yesterday ?mucus pluge/ bronch  observe closely

## 2022-01-05 NOTE — PROGRESS NOTE ADULT - SUBJECTIVE AND OBJECTIVE BOX
HPI:  87 yo F from [facility], CVA with residual left weakness (~3 years ago), PEG for dysphagia, hypothyroid, COPD (on no home Oxygen), HTN, gout, admitted 12/19/21 with fever and SOB  She had right simple mastectomy in 4/2019.  Any Rx after that is unknown. 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 650 milliGRAM(s) Oral every 6 hours PRN Temp greater or equal to 38C (100.4F), Mild Pain (1 - 3)  acetylcysteine 20%  Inhalation 4 milliLiter(s) Inhalation three times a day  albuterol/ipratropium for Nebulization 3 milliLiter(s) Nebulizer every 6 hours  allopurinol 100 milliGRAM(s) Oral daily  artificial  tears Solution 1 Drop(s) Both EYES two times a day  atorvastatin 20 milliGRAM(s) Oral at bedtime  chlorhexidine 4% Liquid 1 Application(s) Topical <User Schedule>  dextrose 40% Gel 15 Gram(s) Oral once  dextrose 5% + sodium chloride 0.9%. 1000 milliLiter(s) IV Continuous <Continuous>  dextrose 5%. 1000 milliLiter(s) IV Continuous <Continuous>  dextrose 5%. 1000 milliLiter(s) IV Continuous <Continuous>  dextrose 50% Injectable 25 Gram(s) IV Push once  dextrose 50% Injectable 12.5 Gram(s) IV Push once  dextrose 50% Injectable 25 Gram(s) IV Push once  ertapenem  IVPB 1000 milliGRAM(s) IV Intermittent every 24 hours  glucagon  Injectable 1 milliGRAM(s) IntraMuscular once  heparin   Injectable 5000 Unit(s) SubCutaneous every 8 hours  insulin lispro (ADMELOG) corrective regimen sliding scale   SubCutaneous every 6 hours  levETIRAcetam  Solution 750 milliGRAM(s) Oral two times a day  levothyroxine 75 MICROGram(s) Oral daily  melatonin 3 milliGRAM(s) Oral at bedtime PRN Insomnia  metoprolol tartrate 50 milliGRAM(s) Oral two times a day  nystatin Cream 1 Application(s) Topical two times a day  sodium chloride 0.9% lock flush 10 milliLiter(s) IV Push every 1 hour PRN Pre/post blood products, medications, blood draw, and to maintain line patency  sodium chloride 3%  Inhalation 4 milliLiter(s) Inhalation every 6 hours  vancomycin  IVPB 1000 milliGRAM(s) IV Intermittent every 24 hours    Labs:  CBC Full  -  ( 05 Jan 2022 07:28 )  WBC Count : 5.05 K/uL  Hemoglobin : 8.7 g/dL  Hematocrit : 29.3 %  Platelet Count - Automated : 277 K/uL  Mean Cell Volume : 108.5 fl  Mean Cell Hemoglobin : 32.2 pg  Mean Cell Hemoglobin Concentration : 29.7 gm/dL  Auto Neutrophil # : x  Auto Lymphocyte # : x  Auto Monocyte # : x  Auto Eosinophil # : x  Auto Basophil # : x  Auto Neutrophil % : x  Auto Lymphocyte % : x  Auto Monocyte % : x  Auto Eosinophil % : x  Auto Basophil % : x    01-05    144  |  107  |  10  ----------------------------<  147<H>  4.2   |  18<L>  |  0.62    Ca    9.3      05 Jan 2022 07:32  Phos  4.1     01-05  Mg     1.9     01-05    TPro  6.5  /  Alb  3.5  /  TBili  0.2  /  DBili  x   /  AST  14  /  ALT  22  /  AlkPhos  82  01-05      Radiology:     < from: Xray Chest 1 View- PORTABLE-Urgent (Xray Chest 1 View- PORTABLE-Urgent .) (01.05.22 @ 06:52) >  FINDINGS:  Airway machine/device over left chest and neck.  Heart size cannot be assessed in this projection.  White out of the left lung with tracheal deviation towards the left,   consistent with left lung collapse. The right lung is clear.  There is no pneumothorax or pleural effusion.    IMPRESSION:  Left lung collapse.    < end of copied text >          ROS:  Patient in BIPAP.  Events noted regarding RRT.     Vital Signs Last 24 Hrs  T(C): 37.2 (05 Jan 2022 05:00), Max: 37.4 (04 Jan 2022 21:30)  T(F): 98.9 (05 Jan 2022 05:00), Max: 99.4 (04 Jan 2022 21:30)  HR: 91 (05 Jan 2022 10:14) (89 - 130)  BP: 121/90 (05 Jan 2022 05:00) (96/62 - 124/70)  BP(mean): --  RR: 22 (05 Jan 2022 10:14) (18 - 24)  SpO2: 99% (05 Jan 2022 10:14) (83% - 99%)    Physical exam:    No distress, obese  CVS: S1, S2   Chest: bilateral breath sound without rales  Abdomen: soft, not tender, no organomegaly or masses  No focal neuro deficit  No edema      Assessment and Plan: HPI:  87 yo F from [facility], CVA with residual left weakness (~3 years ago), PEG for dysphagia, hypothyroid, COPD (on no home Oxygen), HTN, gout, admitted 12/19/21 with fever and SOB  She had right simple mastectomy in 4/2019.  Any Rx after that is unknown. 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 650 milliGRAM(s) Oral every 6 hours PRN Temp greater or equal to 38C (100.4F), Mild Pain (1 - 3)  acetylcysteine 20%  Inhalation 4 milliLiter(s) Inhalation three times a day  albuterol/ipratropium for Nebulization 3 milliLiter(s) Nebulizer every 6 hours  allopurinol 100 milliGRAM(s) Oral daily  artificial  tears Solution 1 Drop(s) Both EYES two times a day  atorvastatin 20 milliGRAM(s) Oral at bedtime  chlorhexidine 4% Liquid 1 Application(s) Topical <User Schedule>  dextrose 40% Gel 15 Gram(s) Oral once  dextrose 5% + sodium chloride 0.9%. 1000 milliLiter(s) IV Continuous <Continuous>  dextrose 5%. 1000 milliLiter(s) IV Continuous <Continuous>  dextrose 5%. 1000 milliLiter(s) IV Continuous <Continuous>  dextrose 50% Injectable 25 Gram(s) IV Push once  dextrose 50% Injectable 12.5 Gram(s) IV Push once  dextrose 50% Injectable 25 Gram(s) IV Push once  ertapenem  IVPB 1000 milliGRAM(s) IV Intermittent every 24 hours  glucagon  Injectable 1 milliGRAM(s) IntraMuscular once  heparin   Injectable 5000 Unit(s) SubCutaneous every 8 hours  insulin lispro (ADMELOG) corrective regimen sliding scale   SubCutaneous every 6 hours  levETIRAcetam  Solution 750 milliGRAM(s) Oral two times a day  levothyroxine 75 MICROGram(s) Oral daily  melatonin 3 milliGRAM(s) Oral at bedtime PRN Insomnia  metoprolol tartrate 50 milliGRAM(s) Oral two times a day  nystatin Cream 1 Application(s) Topical two times a day  sodium chloride 0.9% lock flush 10 milliLiter(s) IV Push every 1 hour PRN Pre/post blood products, medications, blood draw, and to maintain line patency  sodium chloride 3%  Inhalation 4 milliLiter(s) Inhalation every 6 hours  vancomycin  IVPB 1000 milliGRAM(s) IV Intermittent every 24 hours    Labs:  CBC Full  -  ( 05 Jan 2022 07:28 )  WBC Count : 5.05 K/uL  Hemoglobin : 8.7 g/dL  Hematocrit : 29.3 %  Platelet Count - Automated : 277 K/uL  Mean Cell Volume : 108.5 fl  Mean Cell Hemoglobin : 32.2 pg  Mean Cell Hemoglobin Concentration : 29.7 gm/dL  Auto Neutrophil # : x  Auto Lymphocyte # : x  Auto Monocyte # : x  Auto Eosinophil # : x  Auto Basophil # : x  Auto Neutrophil % : x  Auto Lymphocyte % : x  Auto Monocyte % : x  Auto Eosinophil % : x  Auto Basophil % : x    01-05    144  |  107  |  10  ----------------------------<  147<H>  4.2   |  18<L>  |  0.62    Ca    9.3      05 Jan 2022 07:32  Phos  4.1     01-05  Mg     1.9     01-05    TPro  6.5  /  Alb  3.5  /  TBili  0.2  /  DBili  x   /  AST  14  /  ALT  22  /  AlkPhos  82  01-05      Radiology:     < from: Xray Chest 1 View- PORTABLE-Urgent (Xray Chest 1 View- PORTABLE-Urgent .) (01.05.22 @ 06:52) >  FINDINGS:  Airway machine/device over left chest and neck.  Heart size cannot be assessed in this projection.  White out of the left lung with tracheal deviation towards the left,   consistent with left lung collapse. The right lung is clear.  There is no pneumothorax or pleural effusion.    IMPRESSION:  Left lung collapse.    < end of copied text >          ROS:  Patient on high flow oxygen  Events noted regarding RRT.     Vital Signs Last 24 Hrs  T(C): 37.2 (05 Jan 2022 05:00), Max: 37.4 (04 Jan 2022 21:30)  T(F): 98.9 (05 Jan 2022 05:00), Max: 99.4 (04 Jan 2022 21:30)  HR: 91 (05 Jan 2022 10:14) (89 - 130)  BP: 121/90 (05 Jan 2022 05:00) (96/62 - 124/70)  BP(mean): --  RR: 22 (05 Jan 2022 10:14) (18 - 24)  SpO2: 99% (05 Jan 2022 10:14) (83% - 99%)    Physical exam:    On high flow oxygen, obese  CVS: S1, S2   Chest: bilateral breath sound without rales  Abdomen: soft, not tender, no organomegaly or masses, PEG  No focal neuro deficit  No edema      Assessment and Plan:

## 2022-01-05 NOTE — PROVIDER CONTACT NOTE (OTHER) - ACTION/TREATMENT ORDERED:
NP made aware of situation and at bedside. Duoneb ordered and given w/ no improvement of pt's SpO2. Pt then placed on nonrebreather mask as per NP and pts SpO2 improved to 94%. CXR also ordered. NP made aware of situation. Duoneb ordered and given w/ no improvement of pt's SpO2. Pt then placed on nonrebreather mask as per NP and pts SpO2 improved to 94%. CXR also ordered. as per NP, will come to bedside to assess, duoneb to be ordered.

## 2022-01-05 NOTE — CONSULT NOTE ADULT - ASSESSMENT
ASSESSMENT:    86 year old gentlewoman, lifelong non-smoker, without history of intrinsic lung disease. 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 She has a history of HTN, HLD, CAD s/p MI with preserved LVEF and moderate aortic stenosis. The patient was admitted on 12/19 with fever and abdominal pain. She was found to have perforated appendicitis with abscess formation. The patient has been treated with tube drainage (removed several days ago) and antibiotics for a polymicrobial infection (currently on vancomycin and ertapenem). CT scan on admission had debris within the left lobar and more distal airways of the left lower lobe as well as some debris within the right lower lobe airway - there was complete atelectasis of the left lower lobe and subsegmental atelectatic changes within the left upper lobe and dependent portions of the right lung. Overnight, the patient developed increased work of breathing, tachycardia, tachypnea and hypoxemia. The patient has been placed on BIPAP which she is finding very uncomfortable. She is currently without respiratory distress and without hypoxemia on FiO2 40%. She has no cough, sputum production, chest congestion or wheeze. No fevers, chills or sweats. No chest pain/pressure or palpitations. CXR "to my eye" reveals  complete opacification of the left hemithorax with leftward tracheal deviation c/w worsening mucous plugging and left lung atelectasis    PLAN/RECOMMENDATIONS:    discontinue BIPAP -> transition to a 100% high flow nasal canula  restart NIV for increased work of breathing, resistant hypoxemia or respiratory acidosis  albuterol/atrovent nebs q6h  hypertonic saline and mucomyst nebs to facilitate mucous clearance  chest vest ordered  aggressive manual chest PT  continue vancomycin/ertapenem - ID follow-up  cardiac meds: lopressor/lipitor  DVT prophylaxis - SQ heparin  allopurinol/keppra/synthroid  PEG feeds - NPO after midnight for possible bronchoscopy    Thank you for the courtesy of this referral. Plan of care discussed with the patient and her RN at bedside, with the dedicated floor NP and with Dr. Sanya Marcano MD, [facility]  [phone]  Pulmonary Medicine     ASSESSMENT:    86 year old gentlewoman, lifelong non-smoker, without history of intrinsic lung disease. 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 She has a history of HTN, HLD, CAD s/p MI with preserved LVEF and moderate aortic stenosis. The patient was admitted on 12/19 with fever and abdominal pain. She was found to have perforated appendicitis with abscess formation. The patient has been treated with tube drainage (removed several days ago) and antibiotics for a polymicrobial infection (currently on vancomycin and ertapenem). CT scan on admission had debris within the left lobar and more distal airways of the left lower lobe as well as some debris within the right lower lobe airway - there was complete atelectasis of the left lower lobe and subsegmental atelectatic changes within the left upper lobe and dependent portions of the right lung. Overnight, the patient developed increased work of breathing, tachycardia, tachypnea and hypoxemia. The patient has been placed on BIPAP which she is finding very uncomfortable. She is currently without respiratory distress and without hypoxemia on FiO2 40%. She has no cough, sputum production, chest congestion or wheeze. No fevers, chills or sweats. No chest pain/pressure or palpitations. CXR "to my eye" reveals  complete opacification of the left hemithorax with leftward tracheal deviation c/w worsening mucous plugging and left lung atelectasis    PLAN/RECOMMENDATIONS:    discontinue BIPAP -> transition to a 100% high flow nasal canula  restart NIV for increased work of breathing, resistant hypoxemia or respiratory acidosis  albuterol/atrovent nebs q6h  hypertonic saline and mucomyst nebs to facilitate mucous clearance  chest vest ordered  aggressive manual chest PT  continue vancomycin/ertapenem - ID follow-up  cardiac meds: lopressor/lipitor  DVT prophylaxis - SQ heparin  allopurinol/keppra/synthroid  glucose control  PEG feeds - NPO after midnight for possible bronchoscopy    Thank you for the courtesy of this referral. Plan of care discussed with the patient and her RN at bedside, with the dedicated floor NP and with Dr. Sanya Marcano MD, [facility]  [phone]  Pulmonary Medicine     ASSESSMENT:    86 year old gentlewoman, lifelong non-smoker, without history of intrinsic lung disease. 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 She has a history of HTN, HLD, CAD s/p MI with preserved LVEF and moderate aortic stenosis. The patient was admitted on 12/19 with fever and abdominal pain. She was found to have perforated appendicitis with abscess formation. The patient has been treated with tube drainage (removed several days ago) and antibiotics for a polymicrobial infection (currently on vancomycin and ertapenem). CT scan on admission had debris within the left lobar and more distal airways of the left lower lobe as well as some debris within the right lower lobe airway - there was complete atelectasis of the left lower lobe and subsegmental atelectatic changes within the left upper lobe and dependent portions of the right lung. This was likely due to the patient's weak cough following her CVA. Overnight, the patient developed increased work of breathing, tachycardia, tachypnea and hypoxemia. The patient has been placed on BIPAP which she is finding very uncomfortable. She is currently without respiratory distress and without hypoxemia on FiO2 40%. She has no cough, sputum production, chest congestion or wheeze. No fevers, chills or sweats. No chest pain/pressure or palpitations. CXR "to my eye" reveals  complete opacification of the left hemithorax with leftward tracheal deviation c/w worsening mucous plugging and complete left lung atelectasis    PLAN/RECOMMENDATIONS:    discontinue BIPAP -> transition to a 100% high flow nasal canula  restart NIV for increased work of breathing, resistant hypoxemia or respiratory acidosis  albuterol/atrovent nebs q6h  hypertonic saline and mucomyst nebs to facilitate mucous clearance  chest vest ordered  aggressive manual chest PT  continue vancomycin/ertapenem - ID follow-up  cardiac meds: lopressor/lipitor  DVT prophylaxis - SQ heparin  allopurinol/keppra/synthroid  glucose control  PEG feeds - NPO after midnight for possible bronchoscopy tomorrow if a repeat CXR in the AM does not show improvement in atelectasis    Thank you for the courtesy of this referral. Plan of care discussed with the patient and her RN at bedside, with the dedicated floor NP and with Dr. Sanya Marcano MD, [facility] [place] [facility]  [phone]  Pulmonary Medicine     ASSESSMENT:    86 year old gentlewoman, lifelong non-smoker, without history of intrinsic lung disease. 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 Although the PEG tube remains in place, the patient now tolerates a regular diet. She has a history of HTN, HLD, CAD s/p MI with preserved LVEF and moderate aortic stenosis. The patient was admitted on 12/19 with fever and abdominal pain. She was found to have perforated appendicitis with abscess formation. The patient has been treated with tube drainage (removed several days ago) and antibiotics for a polymicrobial infection (currently on vancomycin and ertapenem). CT scan on admission had debris within the left lobar and more distal airways of the left lower lobe as well as some debris within the right lower lobe airway - there was complete atelectasis of the left lower lobe and subsegmental atelectatic changes within the left upper lobe and dependent portions of the right lung. This was likely due to the patient's weak cough following her CVA. Overnight, the patient developed increased work of breathing, tachycardia, tachypnea and hypoxemia. The patient has been placed on BIPAP which she is finding very uncomfortable. She is currently without respiratory distress and without hypoxemia on FiO2 40%. She has no cough, sputum production, chest congestion or wheeze. No fevers, chills or sweats. No chest pain/pressure or palpitations. CXR "to my eye" reveals  complete opacification of the left hemithorax with leftward tracheal deviation c/w worsening mucous plugging and complete left lung atelectasis      PLAN/RECOMMENDATIONS:    discontinue BIPAP -> transition to a 100% high flow nasal canula  restart NIV for increased work of breathing, resistant hypoxemia or respiratory acidosis  albuterol/atrovent nebs q6h  hypertonic saline and mucomyst nebs to facilitate mucous clearance  chest vest ordered  aggressive manual chest PT  continue vancomycin x 6 weeks for methicillin resistant Staphylococcus epidermidis bacteremia - completing a course of ertapenem - ID follow-up  cardiac meds: lopressor/lipitor  DVT prophylaxis - SQ heparin  allopurinol/keppra/synthroid  glucose control  NPO after midnight for possible bronchoscopy tomorrow if a repeat CXR in the AM does not show improvement in atelectasis    Thank you for the courtesy of this referral. Plan of care discussed with the patient and her RN at bedside, with the dedicated floor NP and with Dr. Sanya Marcano MD, [facility]  [phone]  Pulmonary Medicine

## 2022-01-05 NOTE — CONSULT NOTE ADULT - CONSULT REASON
acute hypoxic respiratory failure; mucous plugging; left lung atelectasis acute hypoxic respiratory failure; mucous plugging; left lung atelectasis; weak cough; dysphagia; h/o CVA

## 2022-01-05 NOTE — PROGRESS NOTE ADULT - ASSESSMENT
86   year old female      h/o hemorrhagic CVA, has  PEG,  HTN, MI,      HLD, gout   right Ca breast. s/p mastectomy in 2019,  s/p  sentinel node  localization.  4/4,  were  negative         *  p/w SOB and  acute respiratory distress,  was  on   bipap  in er   with  elevated wbc of 17,000 on arrival,  from pna/  probable aspiration/ gram negative pna  cxr,? pl effusion, right  lung opacit   *   s/p cva, has  PEG,  npo/,  on  synthroid, Keppra  ,  *  HTN, on meds  per  card  prior  echo,  normal ef  *  h/o ca breast. /, s/p  R  mastectomy  *  bacteremia. / staph epi, meth R  ct  c/a/p, ?  pna, perforated  appendicitis,  ?  mets  to  acetabulum   surg/ IR  and  oncology eval dr pacheco    appreciate  excellent  care of  house  staff  per  surg, no  intervention  *   sepsis  on  arrival, is  resolving  from  perforated  appendix/ and  Staph epi  bacteremia which is  persistent,  hence  cannot  dismiss  it as  a contaminant    and  also, with  echo, vegetation on  aortic  valve/ endocarditis, ?  esdras,  may  not  change   rx  plan,  will need  extended  course  of  ab   / will defer to card    was on iv vanco, . Cefepime   and flagyl   rpt ct  noted,  RLL  phlegmon,  and  80  cc of  pus  drained by  IR .  c/s  with  ecoli, e coccus  and  sophia  pe r card , pt high risk for esdras  and given that . this will not alter rx. pt  will need   extended  course  of ab   picc  line   by IR   done  on iv  vanco and ertapenem/ follow vanco level  peg dislodged.  IR   replacements  on 1/3   picc  and iv ab  for 6 weeks, per  ID  s/p rrt   for hypoxia. cxr with complete  collapsed  of  left lung/  pulm magnus mir         rd< from: Xray Chest 1 View- PORTABLE-Urgent (12.19.21 @ 09:20) >  IMPRESSION:  Low lung volumes. New small left lower lung hazy opacity may represent   atelectasis versus pleural effusion. Redemonstration of right upper lung   perihilar opacity largely unchanged prior exam.  --- End of Report --  < end of copied text >

## 2022-01-05 NOTE — PROVIDER CONTACT NOTE (OTHER) - ASSESSMENT
Pt stating 'I feel like I can't breathe." Upon assessment, pt noted to have increased work of breathing on 4 L NC. Pt breathing at a rate of 24, SpO2 83% while on 4 L, , and BP 96/62 (see flowsheet). Pt denies pain but states that she feels like she can't catch her breath. Pt stating 'I feel like I can't breathe." Upon assessment, pt noted to have increased work of breathing on 4 L NC. Pt breathing at a rate of 24, SpO2 83% while on 4 L, , and BP 96/62 (see flowsheet). Pt denies pain but states that she feels like she can't catch her breath. Supplemental O2 increased to 6 L NC. Pt turned on side and chest PT performed, pt was then sat up in bed and Upon assessment, pt noted to have increased work of breathing on 4 L NC. Pt breathing at a rate of 24, SpO2 83% while on 4 L, , and BP 96/62 (see flowsheet). Pt denies pain but states that she feels like she can't catch her breath. Supplemental O2 increased to 6 L NC. Pt turned on side and chest PT performed, spo2 90%

## 2022-01-05 NOTE — CONSULT NOTE ADULT - ATTENDING COMMENTS
86F M Obesity, COPD, Gout, CVA, Lt Hemiparesis, Dysphagia s/p PEG, Ruptured Appendix C/w Abscess s/p IR Drainage, Prolonged Hospitalization in RRT for SOB/WOB, Hypoxia found Lt Lung White-out.   - Awake with Moderate Respiratory Distress on AFMO2 SpO2 95%  - Aspiration vs. Mucus Plugging contribution to Lt Lung Atelectasis   - Acute on Chronic Hypoxic Respiratory Failure trial on BiPAP and Chest PT   - Maintain Spont TV >400 cc, 3% HTS Neb and repeat CXR   - Potential Intubation and Vent Support if no improvement in next 1-2 Hr   - Discussed with PMD and RRT Service 86F M Obesity, COPD, Gout, CVA, Lt Hemiparesis, Dysphagia s/p PEG, Ruptured Appendix C/w Abscess s/p IR Drainage, Prolonged Hospitalization in RRT for SOB/WOB, Hypoxia found Lt Lung White-out.   - Awake with Moderate Respiratory Distress on AFMO2 SpO2 95%  - Aspiration vs. Mucus Plugging contribution to Lt Lung Atelectasis   - Acute on Chronic Hypoxic Respiratory Failure trial on BiPAP and Chest PT   - Maintain Spont TV >400 cc, 3% HTS Neb and repeat CXR   - Potential Intubation and Vent Support if no improvement in next 1-2 Hr   - Discussed with PMD and RRT Service     Patient seen and examined with ICU Resident/Fellow at bedside after lab data, medical records and radiology reports reviewed. I have read and agreeable in general with resident's Documented Note, Assessment and Management Plan which reflected my opinions from bedside round and discussion.

## 2022-01-05 NOTE — CHART NOTE - NSCHARTNOTEFT_GEN_A_CORE
Pt seen at the bedside multiple times after RRT. Calm, sleeping. .90, HR 90, RR 22, pulse ox 94%. BIPAP's setting unchanged (10/5, 40%, 14). C/w 3% nebulized saline, chest PT (communicated with resp therapy and RN). Will continue to follow up.    Mel Stahl NP, #[id number]

## 2022-01-05 NOTE — CONSULT NOTE ADULT - SUBJECTIVE AND OBJECTIVE BOX
[facility] - [facility] - CONSULT NOTE    HPI: 86 year old gentlewoman, lifelong non-smoker, without history of intrinsic lung disease. 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 She has a history of HTN, HLD, CAD s/p MI with preserved LVEF and moderate aortic stenosis. The patient was admitted on 12/19 with fever and abdominal pain. She was found to have perforated appendicitis with abscess formation. The patient has been treated with tube drainage (removed several days ago) and antibiotics for a polymicrobial infection (currently on vancomycin and ertapenem). Overnight, the patient developed increased work of breathing, tachycardia, tachypnea and hypoxemia. The patient has been placed on BIPAP which she is finding very uncomfortable. She is currently breathing comfortably and is without hypoxemia on FiO2 40%. She has no cough, sputum production, chest congestion or wheeze. No fevers, chills or sweats. No chest pain/pressure or palpitations. CXR is abnormal. Asked to evaluate    PMHX:  HTN (hypertension)  HLD (dyslipidemia)  CAD (coronary artery disease)  MI (myocardial infarction)  Aortic stenosis  CVA (cerebral vascular accident) - left sided weakness - dysphagia  Gout  UTI (lower urinary tract infection)    PSHX:  Shoulder fracture repair - left  Breast biopsy - guerrero    FAMILY HISTORY:  No pertinent family history in first degree relatives    SOCIAL HISTORY:  lifelong non-smoker; former EtOH overuse    Pulmonary Medications:   acetylcysteine 20%  Inhalation 4 milliLiter(s) Inhalation three times a day  albuterol/ipratropium for Nebulization 3 milliLiter(s) Nebulizer every 6 hours  sodium chloride 3%  Inhalation 4 milliLiter(s) Inhalation every 6 hours    Antimicrobials:  ertapenem  IVPB 1000 milliGRAM(s) IV Intermittent every 24 hours  vancomycin  IVPB 1000 milliGRAM(s) IV Intermittent every 24 hours    Cardiology:  metoprolol tartrate 50 milliGRAM(s) Oral two times a day    Other:  allopurinol 100 milliGRAM(s) Oral daily  artificial  tears Solution 1 Drop(s) Both EYES two times a day  atorvastatin 20 milliGRAM(s) Oral at bedtime  chlorhexidine 4% Liquid 1 Application(s) Topical <User Schedule>  dextrose 40% Gel 15 Gram(s) Oral once  dextrose 5% + sodium chloride 0.9%. 1000 milliLiter(s) IV Continuous <Continuous>  dextrose 5%. 1000 milliLiter(s) IV Continuous <Continuous>  dextrose 5%. 1000 milliLiter(s) IV Continuous <Continuous>  dextrose 50% Injectable 25 Gram(s) IV Push once  dextrose 50% Injectable 12.5 Gram(s) IV Push once  dextrose 50% Injectable 25 Gram(s) IV Push once  glucagon  Injectable 1 milliGRAM(s) IntraMuscular once  heparin   Injectable 5000 Unit(s) SubCutaneous every 8 hours  insulin lispro (ADMELOG) corrective regimen sliding scale   SubCutaneous every 6 hours  levETIRAcetam  Solution 750 milliGRAM(s) Oral two times a day  levothyroxine 75 MICROGram(s) Oral daily  nystatin Cream 1 Application(s) Topical two times a day    Prn:  MEDICATIONS  (PRN):  acetaminophen     Tablet .. 650 milliGRAM(s) Oral every 6 hours PRN Temp greater or equal to 38C (100.4F), Mild Pain (1 - 3)  melatonin 3 milliGRAM(s) Oral at bedtime PRN Insomnia  sodium chloride 0.9% lock flush 10 milliLiter(s) IV Push every 1 hour PRN Pre/post blood products, medications, blood draw, and to maintain line patency      Allergies    Toradol (Urticaria (Mild to Mod); Rash (Mild to Mod))    HOME MEDICATIONS: see  H & P    REVIEW OF SYSTEMS:  Constitutional: As per HPI  HEENT: Within normal limits  CV: As per HPI  Resp: As per HPI  GI: dysphagia  : Within normal limits  Musculoskeletal: Within normal limits  Skin: Within normal limits  Neurological: CVA - > left sided weakness  Psychiatric: Within normal limits  Endocrine: Within normal limits  Hematologic/Lymphatic: Within normal limits  Allergic/Immunologic: Within normal limits    [x] All other systems negative    OBJECTIVE:    I&O's Detail    04 Jan 2022 07:01  -  05 Jan 2022 07:00  --------------------------------------------------------  IN:  Total IN: 0 mL    OUT:    Voided (mL): 900 mL  Total OUT: 900 mL    Total NET: -900 mL    POCT Blood Glucose.: 147 mg/dL (05 Jan 2022 06:31)  POCT Blood Glucose.: 165 mg/dL (04 Jan 2022 23:39)  POCT Blood Glucose.: 108 mg/dL (04 Jan 2022 18:00)  POCT Blood Glucose.: 143 mg/dL (04 Jan 2022 12:00)      PHYSICAL EXAM:  ICU Vital Signs Last 24 Hrs  T(C): 37.2 (05 Jan 2022 05:00), Max: 37.4 (04 Jan 2022 21:30)  T(F): 98.9 (05 Jan 2022 05:00), Max: 99.4 (04 Jan 2022 21:30)  HR: 89 (05 Jan 2022 06:54) (83 - 130)  BP: 121/90 (05 Jan 2022 05:00) (96/62 - 124/70)  BP(mean): --  ABP: --  ABP(mean): --  RR: 20 (05 Jan 2022 05:00) (18 - 24)  SpO2: 94% (05 Jan 2022 06:54) (83% - 96%) now on 40% BIPAP    General: Awake. Alert. Cooperative. Uncomfortable due to the BIPAP mask. Appears stated age 	  HEENT: Atraumatic. Normocephalic. Anicteric. Normal oral mucosa. PERRL. EOMI. BIPAP mask  Neck: Supple. Trachea midline. Thyroid without enlargement/tenderness/nodules. No carotid bruit. No JVD. Short and wide  Cardiovascular: Regular rate and rhythm. S1 S2 normal. III/VI systolic murmur  Respiratory: Respirations unlabored. Decreased breath sounds left hemithorax. No curvature.  Abdomen: Soft. Non-tender. Non-distended. No organomegaly. No masses. Normal bowel sounds. Obese  Extremities: Warm to touch. No clubbing or cyanosis. No pedal edema.   Pulses: 2+ peripheral pulses all extremities.	  Skin: Normal skin color. No rashes or lesions. No ecchymoses. No cyanosis. Warm to touch.  Lymph Nodes: Cervical, supraclavicular and axillary nodes normal  Neurological: Motor and sensory examination equal and normal. A and O x 3  Psychiatry: Appropriate mood and affect.      LABS:                          8.7    5.05  )-----------( 277      ( 05 Jan 2022 07:28 )             29.3     CBC    WBC  5.05 <==, 6.78 <==, 5.38 <==, 5.66 <==, 6.50 <==, 8.08 <==, 11.07 <==    Hemoglobin  8.7 <<==, 9.3 <<==, 9.3 <<==, 8.6 <<==, 9.2 <<==, 9.1 <<==, 9.2 <<==    Hematocrit  29.3 <==, 30.6 <==, 30.7 <==, 28.1 <==, 29.1 <==, 29.4 <==, 29.6 <==    Platelets  277 <==, 298 <==, 266 <==, 256 <==, 257 <==, 236 <==, 236 <==      139  |  105  |  10  ----------------------------<  161<H>    01-05  4.0   |  17<L>  |  0.62    LYTES    sodium  139 <==, 137 <==, 134 <==, 133 <==, 132 <==, 135 <==    potassium   4.0 <==, 4.1 <==, 4.3 <==, 4.8 <==, 4.6 <==, 5.6 <==    chloride  105 <==, 103 <==, 100 <==, 98 <==, 98 <==, 98 <==    carbon dioxide  17 <==, 21 <==, 22 <==, 24 <==, 22 <==, 21 <==    =============================================================================================  RENAL FUNCTION:    Creatinine:   0.62  <<==, 0.56  <<==, 0.56  <<==, 0.65  <<==, 0.66  <<==, 0.56  <<==    BUN:   10 <==, 10 <==, 15 <==, 19 <==, 19 <==, 18 <==    ============================================================================================    calcium   9.4 <==, 8.7 <==, 8.8 <==, 8.8 <==, 9.4 <==, 9.4 <==    phos   4.1 <==    mag   1.9 <==    ============================================================================================  LFTs    AST:   14 <== , 27 <==     ALT:  26  <== , 51  <==     AP:  88  <=, 99  <=    Bili:  0.2  <=, 0.3  <=    PT/INR - ( 03 Jan 2022 07:12 )   PT: 12.7 sec;   INR: 1.06 ratio      Venous Blood Gas:  01-05 @ 06:53  7.32/46/45/24/73.0  VBG Lactate: 2.3    ABG - ( 05 Jan 2022 00:27 )  pH: 7.39  /  pCO2: 34    /  pO2: 75    / HCO3: 21    / Base Excess: -3.8  /  SaO2: 96.7      < from: Transthoracic Echocardiogram (12.21.21 @ 14:39) >    Patient name: FRANKI MEHTA  YOB: 1935   Age: 86 (F)   MR#: [id number]  Study Date: 12/21/2021  Location: [address] [place], [place] [address]er: Tena Garnett OBED  Study quality: Technically difficult/Limited  Referring Physician: Edmond Almazan MD  Blood Pressure: 134/77 mmHg  Height: 163 cm  Weight: 109 kg  BSA: 2.1 m2  Heart Rate: 105 mmHg  ------------------------------------------------------------------------  PROCEDURE: Transthoracic echocardiogram with 2-D, M-Mode  and complete spectral andcolor flow Doppler.  INDICATION: Endocarditis, valve unspecified (I38)  ------------------------------------------------------------------------  Dimensions:    Normal Values:  LA:     3.6    2.0 - 4.0 cm  Ao:     2.7    2.0 - 3.8 cm  SEPTUM: 1.1    0.6 - 1.2 cm  PWT:    1.0    0.6 - 1.1 cm  LVIDd:  3.7    3.0 - 5.6 cm  LVIDs:  2.0    1.8 - 4.0 cm  Derived variables:  LVMI: 60 g/m2  RWT: 0.58  Fractional short: 46 %  EF (Visual Estimate): 70 %  Doppler Peak Velocity (m/sec): AoV=2.5  ------------------------------------------------------------------------  Observations:  Mitral Valve: Mitral annular calcification. Minimal mitral  regurgitation.  Aortic Valve/Aorta: Moderate aortic stenosis:  ---LVOTd 2.14 cm. VTI 18.0 cm.  ---Aortic valve VTI50.4 cm. Mean gradient 13.7 mmHg. DI =  0.36.  ---Aortic valve area by continuity 1.3 cm2.  There may be a vegetation on the left or non-coronary cusp.  Normal aortic root size.  Left Atrium: Normal left atrium.  Left Ventricle: Normal left ventricular internal dimensions  and wall thickness.  Normal left ventricular systolic function. No segmental  wall motion abnormalities. Indeterminate diastolic  function.  Right Heart: Normal right atrium. Normal right ventricular  size and function. Tricuspid valve not well visualized.  Minimal tricuspid regurgitation. Pulmonic valve not well  visualized. No pulmonic regurgitation.  Pericardium/Pleura: Normal pericardium with no pericardial  effusion.  Hemodynamic: No evidence of pulmonary hypertension.  ------------------------------------------------------------------------  Conclusions:  Normal left ventricular systolic function. No segmental  wall motion abnormalities.  Moderate aortic stenosis.  There may be a vegetation on the left or non-coronary cusp  of the aortic valve. Consider transesophageal  echocardiography.  ------------------------------------------------------------------------  Confirmed on  12/22/2021 - 10:32:40 by Rahul Correa M.D.  ------------------------------------------------------------------------  MICROBIOLOGY:     COVID-19 PCR . (01.04.22 @ 17:38)   COVID-19 PCR: NotDetec:     Culture - Blood (12.24.21 @ 17:38)   Specimen Source: .Blood Blood-Venous   Culture Results:   No Growth Final     Culture - Blood (12.24.21 @ 15:22)   Specimen Source: .Blood Blood-Peripheral   Culture Results:   No Growth Final     Culture - Abscess with Gram Stain (12.23.21 @ 18:49)   Specimen Source: .Abscess abdominal abscess   Culture Results:   Few Escherichia coli   Few Morganella morganii   Moderate Enterococcus avium   Numerous Bacteroides thetaiotamcron group "Susceptibilities not performed"   Numerous Clostridium ramosum "Susceptibilities not performed"     RADIOLOGY:  [x ] Chest radiographs reviewed and interpreted by me    CXR 1/5/2022 -> "to my eye" - complete opacification of the left hemithorax with leftward tracheal deviation c/w mucous plugging and left lung atelectasis  ---------------------------------------------------------------------------------------------------------------  < from: CT Chest w/ IV Cont (12.19.21 @ 21:39) >    EXAM:  CT ABDOMEN AND PELVIS IC                        EXAM:  CT CHEST IC                          PROCEDURE DATE:  12/19/2021      FINDINGS:    CHEST:  Motion degradation, particularly at the lung bases.    LUNGS AND LARGE AIRWAYS: Debris within the left lobar and more distal   airways of the left lower lobe. There is likely some debris within the   right lower lobe airways as well, though motion degradation limits   evaluation. Complete atelectasis of the left lower lobe. Subsegmental   atelectatic changes within the left upper lobe and dependent portions of   the right lung.  PLEURA: No pleural effusion. No pneumothorax.  VESSELS: Atherosclerotic changes. Coronary artery calcifications.  HEART: Heart size is normal. Aortic valve calcification. No pericardial   effusion.  MEDIASTINUM AND GIOVANA: No lymphadenopathy.  CHEST WALL AND LOWER NECK: Right mastectomy.    ABDOMEN AND PELVIS:  Motion degradation, most pronounced in the upper abdomen.    LIVER: Normal size and morphology. Subcentimeter calcified granuloma in   the right lobe. No suspicious liver lesion. Hepatic and portal veins are   patent.  BILE DUCTS: Hyperdensity in the region of the distal common bile duct   (series 3 image 130), which may reflect choledocholithiasis or adjacent   pancreatic parenchymal calcification. Bile ducts are of normal caliber,   with the common bile duct measuring up to 5 mm in diameter.  GALLBLADDER: Cholelithiasis.  SPLEEN: Within normal limits.  PANCREAS: Hypodense cystic appearing lesion measuring 1.0 cm in the   pancreatic tail (series 3 image 108), which is new since 7/2/2017, but is   unchanged since 5/28/2021. No main pancreatic duct dilation.  ADRENALS: Within normal limits.  KIDNEYS/URETERS: Within normal limits.    BLADDER: Within normal limits.  REPRODUCTIVEORGANS: Thickening of the endometrial complex at the fundus,   measuring up to 1.6 cm.    BOWEL:  Fluid collection within the right lower quadrant measuring 7.3 x 3.9 x   5.7 cm (series 3 image 209) with surrounding fatty infiltration. The   collection is associated with the tip of the appendix, which is   indistinct, with findings concerning for perforated appendicitis. No   pneumoperitoneum. There is a punctate 1 to 2 mm calcific density within   the collection (series 3 image 12), which may represent an appendicolith.    Small hiatal hernia. Percutaneous gastrostomy tube, the balloon located   within the gastric antrum. Duodenal diverticulum arising from the third   portion of the duodenum. Colonic diverticulosis. No bowel obstruction.    PERITONEUM: Right lower quadrant fluid collection, as described above.  VESSELS: Atherosclerotic changes.  RETROPERITONEUM/LYMPH NODES: No lymphadenopathy.  ABDOMINAL WALL: Small fat-containing umbilical hernia on a background of   diastases recti. Percutaneous gastrostomy tube.  BONES: Fixation hardware of the proximal left humerus. Degenerative   changes. Osseous demineralization. Mild to moderate loss of height of the   T11 and L1 vertebral bodies, unchanged since 2017.    Soft tissue density measuring 2.8 x 1.8 cm centered within the right   posterior medial acetabulum with associated erosion of the cortex (series   4 image 794). Findings are new since 7/2/2017.    IMPRESSION:  Findings concerning for perforated appendicitis with a right lower   quadrant collection associated with the appendiceal tip.    Complete atelectasis of the left lower lobe with debris occupying the   left lower lobe airways. An underlying pneumonia is not excluded.    Indeterminate lesion within the right acetabulum with associated osseous   erosion. Malignancy is not excluded.    Cholelithiasis and suspected choledocholithiasis. No biliary duct   dilation.    Thickening of the endometrial complex. Pelvic ultrasound may be performed   for further assessment.    Pancreatic tail lesion measuring 1 cm, possibly a side branch IPMN. This   can be further assessed with nonemergent contrast enhanced abdominal   MRI/MRCP.    Findings were discussed with Dr. Wade 12/20/2021 9:14 AM by Dr. Alan with readback confirmation.    NICANOR ALAN MD; Attending Radiologist  This document has been electronically signed. Dec 20 2021  9:17AM    ---------------------------------------------------------------------------------------------------------------           [facility] - [facility] - CONSULT NOTE    HPI: 86 year old gentlewoman, lifelong non-smoker, without history of intrinsic lung disease. 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 She has a history of HTN, HLD, CAD s/p MI with preserved LVEF and moderate aortic stenosis. The patient was admitted on 12/19 with fever and abdominal pain. She was found to have perforated appendicitis with abscess formation. The patient has been treated with tube drainage (removed several days ago) and antibiotics for a polymicrobial infection (currently on vancomycin and ertapenem). Overnight, the patient developed increased work of breathing, tachycardia, tachypnea and hypoxemia. The patient has been placed on BIPAP which she is finding very uncomfortable. She is currently breathing comfortably and is without hypoxemia on FiO2 40%. She has no cough, sputum production, chest congestion or wheeze. No fevers, chills or sweats. No chest pain/pressure or palpitations. CXR is abnormal. Asked to evaluate    PMHX:  HTN (hypertension)  HLD (dyslipidemia)  CAD (coronary artery disease)  MI (myocardial infarction)  Aortic stenosis  CVA (cerebral vascular accident) - left sided weakness - dysphagia  Gout  UTI (lower urinary tract infection)  Breast cancer    PSHX:  Shoulder fracture repair - left  Mastectomy - right - 2019    FAMILY HISTORY:  No pertinent family history in first degree relatives    SOCIAL HISTORY:  lifelong non-smoker; former EtOH overuse    Pulmonary Medications:   acetylcysteine 20%  Inhalation 4 milliLiter(s) Inhalation three times a day  albuterol/ipratropium for Nebulization 3 milliLiter(s) Nebulizer every 6 hours  sodium chloride 3%  Inhalation 4 milliLiter(s) Inhalation every 6 hours    Antimicrobials:  ertapenem  IVPB 1000 milliGRAM(s) IV Intermittent every 24 hours  vancomycin  IVPB 1000 milliGRAM(s) IV Intermittent every 24 hours    Cardiology:  metoprolol tartrate 50 milliGRAM(s) Oral two times a day    Other:  allopurinol 100 milliGRAM(s) Oral daily  artificial  tears Solution 1 Drop(s) Both EYES two times a day  atorvastatin 20 milliGRAM(s) Oral at bedtime  chlorhexidine 4% Liquid 1 Application(s) Topical <User Schedule>  dextrose 40% Gel 15 Gram(s) Oral once  dextrose 5% + sodium chloride 0.9%. 1000 milliLiter(s) IV Continuous <Continuous>  dextrose 5%. 1000 milliLiter(s) IV Continuous <Continuous>  dextrose 5%. 1000 milliLiter(s) IV Continuous <Continuous>  dextrose 50% Injectable 25 Gram(s) IV Push once  dextrose 50% Injectable 12.5 Gram(s) IV Push once  dextrose 50% Injectable 25 Gram(s) IV Push once  glucagon  Injectable 1 milliGRAM(s) IntraMuscular once  heparin   Injectable 5000 Unit(s) SubCutaneous every 8 hours  insulin lispro (ADMELOG) corrective regimen sliding scale   SubCutaneous every 6 hours  levETIRAcetam  Solution 750 milliGRAM(s) Oral two times a day  levothyroxine 75 MICROGram(s) Oral daily  nystatin Cream 1 Application(s) Topical two times a day    Prn:  MEDICATIONS  (PRN):  acetaminophen     Tablet .. 650 milliGRAM(s) Oral every 6 hours PRN Temp greater or equal to 38C (100.4F), Mild Pain (1 - 3)  melatonin 3 milliGRAM(s) Oral at bedtime PRN Insomnia  sodium chloride 0.9% lock flush 10 milliLiter(s) IV Push every 1 hour PRN Pre/post blood products, medications, blood draw, and to maintain line patency      Allergies    Toradol (Urticaria (Mild to Mod); Rash (Mild to Mod))    HOME MEDICATIONS: see  H & P    REVIEW OF SYSTEMS:  Constitutional: As per HPI  HEENT: Within normal limits  CV: As per HPI  Resp: As per HPI  GI: dysphagia  : Within normal limits  Musculoskeletal: Within normal limits  Skin: Within normal limits  Neurological: CVA - > left sided weakness  Psychiatric: Within normal limits  Endocrine: Within normal limits  Hematologic/Lymphatic: Within normal limits  Allergic/Immunologic: Within normal limits    [x] All other systems negative    OBJECTIVE:    I&O's Detail    04 Jan 2022 07:01  -  05 Jan 2022 07:00  --------------------------------------------------------  IN:  Total IN: 0 mL    OUT:    Voided (mL): 900 mL  Total OUT: 900 mL    Total NET: -900 mL    POCT Blood Glucose.: 147 mg/dL (05 Jan 2022 06:31)  POCT Blood Glucose.: 165 mg/dL (04 Jan 2022 23:39)  POCT Blood Glucose.: 108 mg/dL (04 Jan 2022 18:00)  POCT Blood Glucose.: 143 mg/dL (04 Jan 2022 12:00)      PHYSICAL EXAM:  ICU Vital Signs Last 24 Hrs  T(C): 37.2 (05 Jan 2022 05:00), Max: 37.4 (04 Jan 2022 21:30)  T(F): 98.9 (05 Jan 2022 05:00), Max: 99.4 (04 Jan 2022 21:30)  HR: 89 (05 Jan 2022 06:54) (83 - 130)  BP: 121/90 (05 Jan 2022 05:00) (96/62 - 124/70)  BP(mean): --  ABP: --  ABP(mean): --  RR: 20 (05 Jan 2022 05:00) (18 - 24)  SpO2: 94% (05 Jan 2022 06:54) (83% - 96%) now on 40% BIPAP    General: Awake. Alert. Cooperative. Uncomfortable due to the BIPAP mask. Appears stated age 	  HEENT: Atraumatic. Normocephalic. Anicteric. Normal oral mucosa. PERRL. EOMI. BIPAP mask  Neck: Supple. Trachea midline. Thyroid without enlargement/tenderness/nodules. No carotid bruit. No JVD. Short and wide  Cardiovascular: Regular rate and rhythm. S1 S2 normal. III/VI systolic murmur  Respiratory: Respirations unlabored. Decreased breath sounds left hemithorax. No curvature.  Abdomen: Soft. Non-tender. Non-distended. No organomegaly. No masses. Normal bowel sounds. Obese  Extremities: Warm to touch. No clubbing or cyanosis. No pedal edema.   Pulses: 2+ peripheral pulses all extremities.	  Skin: Normal skin color. No rashes or lesions. No ecchymoses. No cyanosis. Warm to touch.  Lymph Nodes: Cervical, supraclavicular and axillary nodes normal  Neurological: Motor and sensory examination equal and normal. A and O x 3  Psychiatry: Appropriate mood and affect.      LABS:                          8.7    5.05  )-----------( 277      ( 05 Jan 2022 07:28 )             29.3     CBC    WBC  5.05 <==, 6.78 <==, 5.38 <==, 5.66 <==, 6.50 <==, 8.08 <==, 11.07 <==    Hemoglobin  8.7 <<==, 9.3 <<==, 9.3 <<==, 8.6 <<==, 9.2 <<==, 9.1 <<==, 9.2 <<==    Hematocrit  29.3 <==, 30.6 <==, 30.7 <==, 28.1 <==, 29.1 <==, 29.4 <==, 29.6 <==    Platelets  277 <==, 298 <==, 266 <==, 256 <==, 257 <==, 236 <==, 236 <==      139  |  105  |  10  ----------------------------<  161<H>    01-05  4.0   |  17<L>  |  0.62    LYTES    sodium  139 <==, 137 <==, 134 <==, 133 <==, 132 <==, 135 <==    potassium   4.0 <==, 4.1 <==, 4.3 <==, 4.8 <==, 4.6 <==, 5.6 <==    chloride  105 <==, 103 <==, 100 <==, 98 <==, 98 <==, 98 <==    carbon dioxide  17 <==, 21 <==, 22 <==, 24 <==, 22 <==, 21 <==    =============================================================================================  RENAL FUNCTION:    Creatinine:   0.62  <<==, 0.56  <<==, 0.56  <<==, 0.65  <<==, 0.66  <<==, 0.56  <<==    BUN:   10 <==, 10 <==, 15 <==, 19 <==, 19 <==, 18 <==    ============================================================================================    calcium   9.4 <==, 8.7 <==, 8.8 <==, 8.8 <==, 9.4 <==, 9.4 <==    phos   4.1 <==    mag   1.9 <==    ============================================================================================  LFTs    AST:   14 <== , 27 <==     ALT:  26  <== , 51  <==     AP:  88  <=, 99  <=    Bili:  0.2  <=, 0.3  <=    PT/INR - ( 03 Jan 2022 07:12 )   PT: 12.7 sec;   INR: 1.06 ratio      Venous Blood Gas:  01-05 @ 06:53  7.32/46/45/24/73.0  VBG Lactate: 2.3    ABG - ( 05 Jan 2022 00:27 )  pH: 7.39  /  pCO2: 34    /  pO2: 75    / HCO3: 21    / Base Excess: -3.8  /  SaO2: 96.7      < from: Transthoracic Echocardiogram (12.21.21 @ 14:39) >    Patient name: FRANKI MEHTA  YOB: 1935   Age: 86 (F)   MR#: [id number]  Study Date: 12/21/2021  Location: [address] [place], [place] [id number]: Tena Garnett [facility]  Study quality: Technically difficult/Limited  Referring Physician: Edmond Almazan MD  Blood Pressure: 134/77 mmHg  Height: 163 cm  Weight: 109 kg  BSA: 2.1 m2  Heart Rate: 105 mmHg  ------------------------------------------------------------------------  PROCEDURE: Transthoracic echocardiogram with 2-D, M-Mode  and complete spectral andcolor flow Doppler.  INDICATION: Endocarditis, valve unspecified (I38)  ------------------------------------------------------------------------  Dimensions:    Normal Values:  LA:     3.6    2.0 - 4.0 cm  Ao:     2.7    2.0 - 3.8 cm  SEPTUM: 1.1    0.6 - 1.2 cm  PWT:    1.0    0.6 - 1.1 cm  LVIDd:  3.7    3.0 - 5.6 cm  LVIDs:  2.0    1.8 - 4.0 cm  Derived variables:  LVMI: 60 g/m2  RWT: 0.58  Fractional short: 46 %  EF (Visual Estimate): 70 %  Doppler Peak Velocity (m/sec): AoV=2.5  ------------------------------------------------------------------------  Observations:  Mitral Valve: Mitral annular calcification. Minimal mitral  regurgitation.  Aortic Valve/Aorta: Moderate aortic stenosis:  ---LVOTd 2.14 cm. VTI 18.0 cm.  ---Aortic valve VTI50.4 cm. Mean gradient 13.7 mmHg. DI =  0.36.  ---Aortic valve area by continuity 1.3 cm2.  There may be a vegetation on the left or non-coronary cusp.  Normal aortic root size.  Left Atrium: Normal left atrium.  Left Ventricle: Normal left ventricular internal dimensions  and wall thickness.  Normal left ventricular systolic function. No segmental  wall motion abnormalities. Indeterminate diastolic  function.  Right Heart: Normal right atrium. Normal right ventricular  size and function. Tricuspid valve not well visualized.  Minimal tricuspid regurgitation. Pulmonic valve not well  visualized. No pulmonic regurgitation.  Pericardium/Pleura: Normal pericardium with no pericardial  effusion.  Hemodynamic: No evidence of pulmonary hypertension.  ------------------------------------------------------------------------  Conclusions:  Normal left ventricular systolic function. No segmental  wall motion abnormalities.  Moderate aortic stenosis.  There may be a vegetation on the left or non-coronary cusp  of the aortic valve. Consider transesophageal  echocardiography.  ------------------------------------------------------------------------  Confirmed on  12/22/2021 - 10:32:40 by Rahul Correa M.D.  ------------------------------------------------------------------------  MICROBIOLOGY:     COVID-19 PCR . (01.04.22 @ 17:38)   COVID-19 PCR: [place]:     Culture - Blood (12.24.21 @ 17:38)   Specimen Source: .Blood Blood-Venous   Culture Results:   No Growth Final     Culture - Blood (12.24.21 @ 15:22)   Specimen Source: .Blood Blood-Peripheral   Culture Results:   No Growth Final     Culture - Abscess with Gram Stain (12.23.21 @ 18:49)   Specimen Source: .Abscess abdominal abscess   Culture Results:   Few Escherichia coli   Few Morganella morganii   Moderate Enterococcus avium   Numerous Bacteroides thetaiotamcron group "Susceptibilities not performed"   Numerous Clostridium ramosum "Susceptibilities not performed"     RADIOLOGY:  [x ] Chest radiographs reviewed and interpreted by me    CXR 1/5/2022 -> "to my eye" - complete opacification of the left hemithorax with leftward tracheal deviation c/w mucous plugging and left lung atelectasis    < from: Xray Chest 1 View- PORTABLE-Urgent (Xray Chest 1 View- PORTABLE-Urgent .) (01.05.22 @ 06:52) >    EXAM:  XR CHEST PORTABLE URGENT 1V                        EXAM:  XR CHEST PORTABLE URGENT 1V                          PROCEDURE DATE:  01/05/2022      INTERPRETATION:  EXAMINATION: XR CHEST URGENT, XR CHEST URGENT    CLINICAL INDICATION: Shortness of Breath, follow up    TECHNIQUE: Frontal, portable views of the chest were obtained at 1/4/2022   11:25 PM and 1/5/2022 6:49 AM    COMPARISON: Chest x-ray 12/25/2021.    FINDINGS:  Airway machine/device over left chest and neck.  Heart size cannot be assessed in this projection.  White out of the left lung with tracheal deviation towards the left,   consistent with left lung collapse. The right lung is clear.  There is no pneumothorax or pleural effusion.    IMPRESSION:  Left lung collapse.    HENRIQUE CRUZ MD; Resident Radiologist  This document has been electronically signed.  PRAVIN CASTILLO MD; Attending Radiologist  This document has been electronically signed. Jan 5 2022 10:11AM  --------------------------------------------------------------------------------------------------------------  < from: CT Chest w/ IV Cont (12.19.21 @ 21:39) >    EXAM:  CT ABDOMEN AND PELVIS IC                        EXAM:  CT CHEST IC                          PROCEDURE DATE:  12/19/2021      FINDINGS:    CHEST:  Motion degradation, particularly at the lung bases.    LUNGS AND LARGE AIRWAYS: Debris within the left lobar and more distal   airways of the left lower lobe. There is likely some debris within the   right lower lobe airways as well, though motion degradation limits   evaluation. Complete atelectasis of the left lower lobe. Subsegmental   atelectatic changes within the left upper lobe and dependent portions of   the right lung.  PLEURA: No pleural effusion. No pneumothorax.  VESSELS: Atherosclerotic changes. Coronary artery calcifications.  HEART: Heart size is normal. Aortic valve calcification. No pericardial   effusion.  MEDIASTINUM AND GIOVANA: No lymphadenopathy.  CHEST WALL AND LOWER NECK: Right mastectomy.    ABDOMEN AND PELVIS:  Motion degradation, most pronounced in the upper abdomen.    LIVER: Normal size and morphology. Subcentimeter calcified granuloma in   the right lobe. No suspicious liver lesion. Hepatic and portal veins are   patent.  BILE DUCTS: Hyperdensity in the region of the distal common bile duct   (series 3 image 130), which may reflect choledocholithiasis or adjacent   pancreatic parenchymal calcification. Bile ducts are of normal caliber,   with the common bile duct measuring up to 5 mm in diameter.  GALLBLADDER: Cholelithiasis.  SPLEEN: Within normal limits.  PANCREAS: Hypodense cystic appearing lesion measuring 1.0 cm in the   pancreatic tail (series 3 image 108), which is new since 7/2/2017, but is   unchanged since 5/28/2021. No main pancreatic duct dilation.  ADRENALS: Within normal limits.  KIDNEYS/URETERS: Within normal limits.    BLADDER: Within normal limits.  REPRODUCTIVEORGANS: Thickening of the endometrial complex at the fundus,   measuring up to 1.6 cm.    BOWEL:  Fluid collection within the right lower quadrant measuring 7.3 x 3.9 x   5.7 cm (series 3 image 209) with surrounding fatty infiltration. The   collection is associated with the tip of the appendix, which is   indistinct, with findings concerning for perforated appendicitis. No   pneumoperitoneum. There is a punctate 1 to 2 mm calcific density within   the collection (series 3 image 12), which may represent an appendicolith.    Small hiatal hernia. Percutaneous gastrostomy tube, the balloon located   within the gastric antrum. Duodenal diverticulum arising from the third   portion of the duodenum. Colonic diverticulosis. No bowel obstruction.    PERITONEUM: Right lower quadrant fluid collection, as described above.  VESSELS: Atherosclerotic changes.  RETROPERITONEUM/LYMPH NODES: No lymphadenopathy.  ABDOMINAL WALL: Small fat-containing umbilical hernia on a background of   diastases recti. Percutaneous gastrostomy tube.  BONES: Fixation hardware of the proximal left humerus. Degenerative   changes. Osseous demineralization. Mild to moderate loss of height of the   T11 and L1 vertebral bodies, unchanged since 2017.    Soft tissue density measuring 2.8 x 1.8 cm centered within the right   posterior medial acetabulum with associated erosion of the cortex (series   4 image 794). Findings are new since 7/2/2017.    IMPRESSION:  Findings concerning for perforated appendicitis with a right lower   quadrant collection associated with the appendiceal tip.    Complete atelectasis of the left lower lobe with debris occupying the   left lower lobe airways. An underlying pneumonia is not excluded.    Indeterminate lesion within the right acetabulum with associated osseous   erosion. Malignancy is not excluded.    Cholelithiasis and suspected choledocholithiasis. No biliary duct   dilation.    Thickening of the endometrial complex. Pelvic ultrasound may be performed   for further assessment.    Pancreatic tail lesion measuring 1 cm, possibly a side branch IPMN. This   can be further assessed with nonemergent contrast enhanced abdominal   MRI/MRCP.    Findings were discussed with Dr. Wade 12/20/2021 9:14 AM by Dr. Alan with readback confirmation.    NICANOR ALAN MD; Attending Radiologist  This document has been electronically signed. Dec 20 2021  9:17AM    ---------------------------------------------------------------------------------------------------------------           [facility] - [facility] - CONSULT NOTE    HPI:  86 year old gentlewoman, lifelong non-smoker, without history of intrinsic lung disease. 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 Although the PEG tube remains in place, the patient now tolerates a regular diet. She has a history of HTN, HLD, CAD s/p MI with preserved LVEF and moderate aortic stenosis. The patient was admitted on 12/19 with fever and abdominal pain. She was found to have perforated appendicitis with abscess formation. The patient has been treated with tube drainage (removed several days ago) and antibiotics for a polymicrobial infection (currently on vancomycin and ertapenem). CT scan on admission had debris within the left lobar and more distal airways of the left lower lobe as well as some debris within the right lower lobe airway - there was complete atelectasis of the left lower lobe and subsegmental atelectatic changes within the left upper lobe and dependent portions of the right lung. This was likely due to the patient's weak cough following her CVA. Overnight, the patient developed increased work of breathing, tachycardia, tachypnea and hypoxemia. The patient has been placed on BIPAP which she is finding very uncomfortable. She is currently without respiratory distress and without hypoxemia on FiO2 40%. She has no cough, sputum production, chest congestion or wheeze. No fevers, chills or sweats. No chest pain/pressure or palpitations. CXR is abnormal. Asked to evaluate    PMHX:  HTN (hypertension)  HLD (dyslipidemia)  CAD (coronary artery disease)  MI (myocardial infarction)  Aortic stenosis  CVA (cerebral vascular accident) - left sided weakness - dysphagia  Gout  UTI (lower urinary tract infection)  Breast cancer    PSHX:  Shoulder fracture repair - left  Mastectomy - right - 2019    FAMILY HISTORY:  No pertinent family history in first degree relatives    SOCIAL HISTORY:  lifelong non-smoker; former EtOH overuse    Pulmonary Medications:   acetylcysteine 20%  Inhalation 4 milliLiter(s) Inhalation three times a day  albuterol/ipratropium for Nebulization 3 milliLiter(s) Nebulizer every 6 hours  sodium chloride 3%  Inhalation 4 milliLiter(s) Inhalation every 6 hours    Antimicrobials:  ertapenem  IVPB 1000 milliGRAM(s) IV Intermittent every 24 hours  vancomycin  IVPB 1000 milliGRAM(s) IV Intermittent every 24 hours    Cardiology:  metoprolol tartrate 50 milliGRAM(s) Oral two times a day    Other:  allopurinol 100 milliGRAM(s) Oral daily  artificial  tears Solution 1 Drop(s) Both EYES two times a day  atorvastatin 20 milliGRAM(s) Oral at bedtime  chlorhexidine 4% Liquid 1 Application(s) Topical <User Schedule>  dextrose 40% Gel 15 Gram(s) Oral once  dextrose 5% + sodium chloride 0.9%. 1000 milliLiter(s) IV Continuous <Continuous>  dextrose 5%. 1000 milliLiter(s) IV Continuous <Continuous>  dextrose 5%. 1000 milliLiter(s) IV Continuous <Continuous>  dextrose 50% Injectable 25 Gram(s) IV Push once  dextrose 50% Injectable 12.5 Gram(s) IV Push once  dextrose 50% Injectable 25 Gram(s) IV Push once  glucagon  Injectable 1 milliGRAM(s) IntraMuscular once  heparin   Injectable 5000 Unit(s) SubCutaneous every 8 hours  insulin lispro (ADMELOG) corrective regimen sliding scale   SubCutaneous every 6 hours  levETIRAcetam  Solution 750 milliGRAM(s) Oral two times a day  levothyroxine 75 MICROGram(s) Oral daily  nystatin Cream 1 Application(s) Topical two times a day    Prn:  MEDICATIONS  (PRN):  acetaminophen     Tablet .. 650 milliGRAM(s) Oral every 6 hours PRN Temp greater or equal to 38C (100.4F), Mild Pain (1 - 3)  melatonin 3 milliGRAM(s) Oral at bedtime PRN Insomnia  sodium chloride 0.9% lock flush 10 milliLiter(s) IV Push every 1 hour PRN Pre/post blood products, medications, blood draw, and to maintain line patency      Allergies    Toradol (Urticaria (Mild to Mod); Rash (Mild to Mod))    HOME MEDICATIONS: see  H & P    REVIEW OF SYSTEMS:  Constitutional: As per HPI  HEENT: Within normal limits  CV: As per HPI  Resp: As per HPI  GI: dysphagia  : Within normal limits  Musculoskeletal: Within normal limits  Skin: Within normal limits  Neurological: CVA - > left sided weakness  Psychiatric: Within normal limits  Endocrine: Within normal limits  Hematologic/Lymphatic: Within normal limits  Allergic/Immunologic: Within normal limits    [x] All other systems negative    OBJECTIVE:    I&O's Detail    04 Jan 2022 07:01  -  05 Jan 2022 07:00  --------------------------------------------------------  IN:  Total IN: 0 mL    OUT:    Voided (mL): 900 mL  Total OUT: 900 mL    Total NET: -900 mL    POCT Blood Glucose.: 147 mg/dL (05 Jan 2022 06:31)  POCT Blood Glucose.: 165 mg/dL (04 Jan 2022 23:39)  POCT Blood Glucose.: 108 mg/dL (04 Jan 2022 18:00)  POCT Blood Glucose.: 143 mg/dL (04 Jan 2022 12:00)      PHYSICAL EXAM:  ICU Vital Signs Last 24 Hrs  T(C): 37.2 (05 Jan 2022 05:00), Max: 37.4 (04 Jan 2022 21:30)  T(F): 98.9 (05 Jan 2022 05:00), Max: 99.4 (04 Jan 2022 21:30)  HR: 89 (05 Jan 2022 06:54) (83 - 130)  BP: 121/90 (05 Jan 2022 05:00) (96/62 - 124/70)  BP(mean): --  ABP: --  ABP(mean): --  RR: 20 (05 Jan 2022 05:00) (18 - 24)  SpO2: 94% (05 Jan 2022 06:54) (83% - 96%) now on 40% BIPAP    General: Awake. Alert. Cooperative. Uncomfortable due to the BIPAP mask. Appears stated age 	  HEENT: Atraumatic. Normocephalic. Anicteric. Normal oral mucosa. PERRL. EOMI. BIPAP mask  Neck: Supple. Trachea midline. Thyroid without enlargement/tenderness/nodules. No carotid bruit. No JVD. Short and wide  Cardiovascular: Regular rate and rhythm. S1 S2 normal. III/VI systolic murmur  Respiratory: Respirations unlabored. Decreased breath sounds left hemithorax. No curvature.  Abdomen: Soft. Non-tender. Non-distended. No organomegaly. No masses. Normal bowel sounds. Obese, PEG.  Extremities: Warm to touch. No clubbing or cyanosis. No pedal edema.   Pulses: 2+ peripheral pulses all extremities.	  Skin: Normal skin color. No rashes or lesions. No ecchymoses. No cyanosis. Warm to touch.  Lymph Nodes: Cervical, supraclavicular and axillary nodes normal  Neurological: Left lower extremity plegia with contraction. Left upper extremity is quite weak. A and O x 3  Psychiatry: Appropriate mood and affect.    LABS:                          8.7    5.05  )-----------( 277      ( 05 Jan 2022 07:28 )             29.3     CBC    WBC  5.05 <==, 6.78 <==, 5.38 <==, 5.66 <==, 6.50 <==, 8.08 <==, 11.07 <==    Hemoglobin  8.7 <<==, 9.3 <<==, 9.3 <<==, 8.6 <<==, 9.2 <<==, 9.1 <<==, 9.2 <<==    Hematocrit  29.3 <==, 30.6 <==, 30.7 <==, 28.1 <==, 29.1 <==, 29.4 <==, 29.6 <==    Platelets  277 <==, 298 <==, 266 <==, 256 <==, 257 <==, 236 <==, 236 <==      139  |  105  |  10  ----------------------------<  161<H>    01-05  4.0   |  17<L>  |  0.62    LYTES    sodium  139 <==, 137 <==, 134 <==, 133 <==, 132 <==, 135 <==    potassium   4.0 <==, 4.1 <==, 4.3 <==, 4.8 <==, 4.6 <==, 5.6 <==    chloride  105 <==, 103 <==, 100 <==, 98 <==, 98 <==, 98 <==    carbon dioxide  17 <==, 21 <==, 22 <==, 24 <==, 22 <==, 21 <==    =============================================================================================  RENAL FUNCTION:    Creatinine:   0.62  <<==, 0.56  <<==, 0.56  <<==, 0.65  <<==, 0.66  <<==, 0.56  <<==    BUN:   10 <==, 10 <==, 15 <==, 19 <==, 19 <==, 18 <==    ============================================================================================    calcium   9.4 <==, 8.7 <==, 8.8 <==, 8.8 <==, 9.4 <==, 9.4 <==    phos   4.1 <==    mag   1.9 <==    ============================================================================================  LFTs    AST:   14 <== , 27 <==     ALT:  26  <== , 51  <==     AP:  88  <=, 99  <=    Bili:  0.2  <=, 0.3  <=    PT/INR - ( 03 Jan 2022 07:12 )   PT: 12.7 sec;   INR: 1.06 ratio      Venous Blood Gas:  01-05 @ 06:53  7.32/46/45/24/73.0  VBG Lactate: 2.3    ABG - ( 05 Jan 2022 00:27 )  pH: 7.39  /  pCO2: 34    /  pO2: 75    / HCO3: 21    / Base Excess: -3.8  /  SaO2: 96.7      < from: Transthoracic Echocardiogram (12.21.21 @ 14:39) >    Patient name: FRANKI MEHTA  YOB: 1935   Age: 86 (F)   MR#: [id number]  Study Date: 12/21/2021  Location: [address] [place], [place] [id number]: [facility]  Study quality: Technically difficult/Limited  Referring Physician: Edmond Almazan MD  Blood Pressure: 134/77 mmHg  Height: 163 cm  Weight: 109 kg  BSA: 2.1 m2  Heart Rate: 105 mmHg  ------------------------------------------------------------------------  PROCEDURE: Transthoracic echocardiogram with 2-D, M-Mode  and complete spectral andcolor flow Doppler.  INDICATION: Endocarditis, valve unspecified (I38)  ------------------------------------------------------------------------  Dimensions:    Normal Values:  LA:     3.6    2.0 - 4.0 cm  Ao:     2.7    2.0 - 3.8 cm  SEPTUM: 1.1    0.6 - 1.2 cm  PWT:    1.0    0.6 - 1.1 cm  LVIDd:  3.7    3.0 - 5.6 cm  LVIDs:  2.0    1.8 - 4.0 cm  Derived variables:  LVMI: 60 g/m2  RWT: 0.58  Fractional short: 46 %  EF (Visual Estimate): 70 %  Doppler Peak Velocity (m/sec): AoV=2.5  ------------------------------------------------------------------------  Observations:  Mitral Valve: Mitral annular calcification. Minimal mitral  regurgitation.  Aortic Valve/Aorta: Moderate aortic stenosis:  ---LVOTd 2.14 cm. VTI 18.0 cm.  ---Aortic valve VTI50.4 cm. Mean gradient 13.7 mmHg. DI =  0.36.  ---Aortic valve area by continuity 1.3 cm2.  There may be a vegetation on the left or non-coronary cusp.  Normal aortic root size.  Left Atrium: Normal left atrium.  Left Ventricle: Normal left ventricular internal dimensions  and wall thickness.  Normal left ventricular systolic function. No segmental  wall motion abnormalities. Indeterminate diastolic  function.  Right Heart: Normal right atrium. Normal right ventricular  size and function. Tricuspid valve not well visualized.  Minimal tricuspid regurgitation. Pulmonic valve not well  visualized. No pulmonic regurgitation.  Pericardium/Pleura: Normal pericardium with no pericardial  effusion.  Hemodynamic: No evidence of pulmonary hypertension.  ------------------------------------------------------------------------  Conclusions:  Normal left ventricular systolic function. No segmental  wall motion abnormalities.  Moderate aortic stenosis.  There may be a vegetation on the left or non-coronary cusp  of the aortic valve. Consider transesophageal  echocardiography.  ------------------------------------------------------------------------  Confirmed on  12/22/2021 - 10:32:40 by Rahul Correa M.D.  ------------------------------------------------------------------------  MICROBIOLOGY:     COVID-19 PCR . (01.04.22 @ 17:38)   COVID-19 PCR: NotDetec:     Culture - Blood (12.24.21 @ 17:38)   Specimen Source: .Blood Blood-Venous   Culture Results:   No Growth Final     Culture - Blood (12.24.21 @ 15:22)   Specimen Source: .Blood Blood-Peripheral   Culture Results:   No Growth Final     Culture - Abscess with Gram Stain (12.23.21 @ 18:49)   Specimen Source: .Abscess abdominal abscess   Culture Results:   Few Escherichia coli   Few Morganella morganii   Moderate Enterococcus avium   Numerous Bacteroides thetaiotamcron group "Susceptibilities not performed"   Numerous Clostridium ramosum "Susceptibilities not performed"     Culture - Blood (12.19.21 @ 14:24)   Culture Results:   Growth in aerobic bottle: Staphylococcus epidermidis   Organism Identification: Staphylococcus epidermidis     Culture - Blood (12.19.21 @ 14:24)   Gram Stain:   Growth in aerobic bottle: Gram Positive Cocci in Clusters   Growth in anaerobic bottle: Gram Positive Cocci in Clusters   - Staphylococcus epidermidis, Methicillin resistant: Detec     RADIOLOGY:  [x ] Chest radiographs reviewed and interpreted by me    CXR 1/5/2022 -> "to my eye" - complete opacification of the left hemithorax with leftward tracheal deviation c/w mucous plugging and left lung atelectasis    < from: Xray Chest 1 View- PORTABLE-Urgent (Xray Chest 1 View- PORTABLE-Urgent .) (01.05.22 @ 06:52) >    EXAM:  XR CHEST PORTABLE URGENT 1V                        EXAM:  XR CHEST PORTABLE URGENT 1V                          PROCEDURE DATE:  01/05/2022      INTERPRETATION:  EXAMINATION: XR CHEST URGENT, XR CHEST URGENT    CLINICAL INDICATION: Shortness of Breath, follow up    TECHNIQUE: Frontal, portable views of the chest were obtained at 1/4/2022   11:25 PM and 1/5/2022 6:49 AM    COMPARISON: Chest x-ray 12/25/2021.    FINDINGS:  Airway machine/device over left chest and neck.  Heart size cannot be assessed in this projection.  White out of the left lung with tracheal deviation towards the left,   consistent with left lung collapse. The right lung is clear.  There is no pneumothorax or pleural effusion.    IMPRESSION:  Left lung collapse.    HENRIQUE CRUZ MD; Resident Radiologist  This document has been electronically signed.  PRAVIN CASTILLO MD; Attending Radiologist  This document has been electronically signed. Jan 5 2022 10:11AM  --------------------------------------------------------------------------------------------------------------  < from: CT Chest w/ IV Cont (12.19.21 @ 21:39) >    EXAM:  CT ABDOMEN AND PELVIS IC                        EXAM:  CT CHEST IC                          PROCEDURE DATE:  12/19/2021      FINDINGS:    CHEST:  Motion degradation, particularly at the lung bases.    LUNGS AND LARGE AIRWAYS: Debris within the left lobar and more distal   airways of the left lower lobe. There is likely some debris within the   right lower lobe airways as well, though motion degradation limits   evaluation. Complete atelectasis of the left lower lobe. Subsegmental   atelectatic changes within the left upper lobe and dependent portions of   the right lung.  PLEURA: No pleural effusion. No pneumothorax.  VESSELS: Atherosclerotic changes. Coronary artery calcifications.  HEART: Heart size is normal. Aortic valve calcification. No pericardial   effusion.  MEDIASTINUM AND GIOVANA: No lymphadenopathy.  CHEST WALL AND LOWER NECK: Right mastectomy.    ABDOMEN AND PELVIS:  Motion degradation, most pronounced in the upper abdomen.    LIVER: Normal size and morphology. Subcentimeter calcified granuloma in   the right lobe. No suspicious liver lesion. Hepatic and portal veins are   patent.  BILE DUCTS: Hyperdensity in the region of the distal common bile duct   (series 3 image 130), which may reflect choledocholithiasis or adjacent   pancreatic parenchymal calcification. Bile ducts are of normal caliber,   with the common bile duct measuring up to 5 mm in diameter.  GALLBLADDER: Cholelithiasis.  SPLEEN: Within normal limits.  PANCREAS: Hypodense cystic appearing lesion measuring 1.0 cm in the   pancreatic tail (series 3 image 108), which is new since 7/2/2017, but is   unchanged since 5/28/2021. No main pancreatic duct dilation.  ADRENALS: Within normal limits.  KIDNEYS/URETERS: Within normal limits.    BLADDER: Within normal limits.  REPRODUCTIVEORGANS: Thickening of the endometrial complex at the fundus,   measuring up to 1.6 cm.    BOWEL:  Fluid collection within the right lower quadrant measuring 7.3 x 3.9 x   5.7 cm (series 3 image 209) with surrounding fatty infiltration. The   collection is associated with the tip of the appendix, which is   indistinct, with findings concerning for perforated appendicitis. No   pneumoperitoneum. There is a punctate 1 to 2 mm calcific density within   the collection (series 3 image 12), which may represent an appendicolith.    Small hiatal hernia. Percutaneous gastrostomy tube, the balloon located   within the gastric antrum. Duodenal diverticulum arising from the third   portion of the duodenum. Colonic diverticulosis. No bowel obstruction.    PERITONEUM: Right lower quadrant fluid collection, as described above.  VESSELS: Atherosclerotic changes.  RETROPERITONEUM/LYMPH NODES: No lymphadenopathy.  ABDOMINAL WALL: Small fat-containing umbilical hernia on a background of   diastases recti. Percutaneous gastrostomy tube.  BONES: Fixation hardware of the proximal left humerus. Degenerative   changes. Osseous demineralization. Mild to moderate loss of height of the   T11 and L1 vertebral bodies, unchanged since 2017.    Soft tissue density measuring 2.8 x 1.8 cm centered within the right   posterior medial acetabulum with associated erosion of the cortex (series   4 image [id number]). Findings are new since 7/2/2017.    IMPRESSION:  Findings concerning for perforated appendicitis with a right lower   quadrant collection associated with the appendiceal tip.    Complete atelectasis of the left lower lobe with debris occupying the   left lower lobe airways. An underlying pneumonia is not excluded.    Indeterminate lesion within the right acetabulum with associated osseous   erosion. Malignancy is not excluded.    Cholelithiasis and suspected choledocholithiasis. No biliary duct   dilation.    Thickening of the endometrial complex. Pelvic ultrasound may be performed   for further assessment.    Pancreatic tail lesion measuring 1 cm, possibly a side branch IPMN. This   can be further assessed with nonemergent contrast enhanced abdominal   MRI/MRCP.    Findings were discussed with Dr. Wade 12/20/2021 9:14 AM by Dr. Alan with readback confirmation.    NICANOR ALAN MD; Attending Radiologist  This document has been electronically signed. Dec 20 2021  9:17AM    ---------------------------------------------------------------------------------------------------------------

## 2022-01-05 NOTE — RAPID RESPONSE TEAM SUMMARY - NSSITUATIONBACKGROUNDRRT_GEN_ALL_CORE
85 yo F from [facility], CVA with residual left weakness (~3 years ago), PEG for dysphagia, hypothyroid, COPD (on no home Oxygen), HTN, gout, p/w fever, found to have Perforated appendicitis, currently on Vanc and Invanz     RRT called for Hypoxia to low 80s on 4L NC. Upon arrival, patient was placed on Non-rebreather with improvement in O2 SAT to 88-92, but patient w/ increased wob and tachycardia to 120s-130s. Patient afebrile. Chest xray was done prior and was concerning for mucus plug of L lung. Ordered CBC, CMP, Mg, Phos, ABG and consulted MICU. Placed patient on BIPAP 10/5, FIO2 of 40%, with O2 SAT 88-90%. BP decreased to 80s/50s. Administered 500CC LR, and BP improved to 110s/70s. ABG results showed pH of 7.39, pCO2 of 43. Patient w/ RR 30-35 on BIPAP. Mental status was at baseline. As patient was hemodynamically stable, concluded RRT. However, recommended that primary team have MICU re-evaluate the patient in 1 hour and assess RR/increased WOB and need for intubation.

## 2022-01-05 NOTE — CONSULT NOTE ADULT - SUBJECTIVE AND OBJECTIVE BOX
CHIEF COMPLAINT:     HPI:  87 yo F from [facility], CVA with residual left weakness (~3 years ago), PEG for dysphagia, hypothyroid, COPD (on no home Oxygen), HTN, gout, has  a peg p/w fever/sob. Found to have perforated appendicitis w/ abscess s/p IR drainage on 12/23, currently on Vancomycin and Invanz. RRT today for worsening hypoxia with increasing oxygen requirements, as well as tachycardia.   Patient initially evaluated during RRT, increased WOB and sinus tachycardia to 120 on NRB. Satting 88-90% ABG WNL. Patient placed on Bipap for increased WOB, re-evaluated in an hour. Breathing comfortably on 10/5, 40%, tidal volumes 400-500s. RR<30.     FAMILY HISTORY:  No pertinent family history in first degree relatives        SOCIAL HISTORY:  Smoking: __ packs x ___ years  EtOH Use:  Marital Status:  Occupation:  Recent Travel:  Country of Birth:  Advance Directives:    Allergies    Toradol (Urticaria (Mild to Mod); Rash (Mild to Mod))    Intolerances        HOME MEDICATIONS:    REVIEW OF SYSTEMS:  Unable to obtain     OBJECTIVE:  ICU Vital Signs Last 24 Hrs  T(C): 37.4 (04 Jan 2022 22:29), Max: 37.4 (04 Jan 2022 21:30)  T(F): 99.3 (04 Jan 2022 22:29), Max: 99.4 (04 Jan 2022 21:30)  HR: 94 (05 Jan 2022 00:59) (83 - 130)  BP: 124/70 (04 Jan 2022 21:30) (96/62 - 138/79)  BP(mean): --  ABP: --  ABP(mean): --  RR: 20 (04 Jan 2022 21:30) (18 - 24)  SpO2: 88% (05 Jan 2022 00:59) (83% - 96%)        01-03 @ 07:01  -  01-04 @ 07:00  --------------------------------------------------------  IN: 0 mL / OUT: 1500 mL / NET: -1500 mL    01-04 @ 07:01  -  01-05 @ 01:31  --------------------------------------------------------  IN: 0 mL / OUT: 200 mL / NET: -200 mL      CAPILLARY BLOOD GLUCOSE      POCT Blood Glucose.: 165 mg/dL (04 Jan 2022 23:39)      PHYSICAL EXAM:  GENERAL: NAD, comfortable on Bipap.   HEAD:  Atraumatic, Normocephalic  EYES: EOMI, PERRLA, conjunctiva and sclera clear  ENT: Moist mucous membranes  NECK: Supple, No JVD  CHEST/LUNG: Decreased breath sounds on L sound. Minor diffuse wheezing.   HEART: Regular rate and rhythm; No murmurs, rubs, or gallops  ABDOMEN: Bowel sounds present; Soft, Nontender, Nondistended. No hepatomegally. Peg tube   EXTREMITIES:  2+ Peripheral Pulses, brisk capillary refill. No clubbing, cyanosis, or edema  NERVOUS SYSTEM:  Alert & Oriented X3, speech clear. No deficits.  MSK: FROM all 4 extremities, full and equal strength  SKIN: No rashes or lesions      HOSPITAL MEDICATIONS:  MEDICATIONS  (STANDING):  allopurinol 100 milliGRAM(s) Oral daily  artificial  tears Solution 1 Drop(s) Both EYES two times a day  atorvastatin 20 milliGRAM(s) Oral at bedtime  chlorhexidine 4% Liquid 1 Application(s) Topical <User Schedule>  dextrose 40% Gel 15 Gram(s) Oral once  dextrose 5% + sodium chloride 0.9%. 1000 milliLiter(s) (50 mL/Hr) IV Continuous <Continuous>  dextrose 5%. 1000 milliLiter(s) (50 mL/Hr) IV Continuous <Continuous>  dextrose 5%. 1000 milliLiter(s) (100 mL/Hr) IV Continuous <Continuous>  dextrose 50% Injectable 25 Gram(s) IV Push once  dextrose 50% Injectable 12.5 Gram(s) IV Push once  dextrose 50% Injectable 25 Gram(s) IV Push once  ertapenem  IVPB 1000 milliGRAM(s) IV Intermittent every 24 hours  glucagon  Injectable 1 milliGRAM(s) IntraMuscular once  heparin   Injectable 5000 Unit(s) SubCutaneous every 8 hours  insulin lispro (ADMELOG) corrective regimen sliding scale   SubCutaneous every 6 hours  levETIRAcetam  Solution 750 milliGRAM(s) Oral two times a day  levothyroxine 75 MICROGram(s) Oral daily  metoprolol tartrate 50 milliGRAM(s) Oral two times a day  nystatin Cream 1 Application(s) Topical two times a day  sodium chloride 3%  Inhalation 4 milliLiter(s) Inhalation every 12 hours  vancomycin  IVPB 1000 milliGRAM(s) IV Intermittent every 24 hours    MEDICATIONS  (PRN):  acetaminophen     Tablet .. 650 milliGRAM(s) Oral every 6 hours PRN Temp greater or equal to 38C (100.4F), Mild Pain (1 - 3)  melatonin 3 milliGRAM(s) Oral at bedtime PRN Insomnia  sodium chloride 0.9% lock flush 10 milliLiter(s) IV Push every 1 hour PRN Pre/post blood products, medications, blood draw, and to maintain line patency      LABS:                        9.3    6.78  )-----------( 298      ( 05 Jan 2022 00:51 )             30.6     01-05    139  |  105  |  10  ----------------------------<  161<H>  4.0   |  17<L>  |  0.62    Ca    9.4      05 Jan 2022 00:49  Phos  4.1     01-05  Mg     1.9     01-05    TPro  6.8  /  Alb  3.5  /  TBili  0.2  /  DBili  x   /  AST  14  /  ALT  26  /  AlkPhos  88  01-05    PT/INR - ( 03 Jan 2022 07:12 )   PT: 12.7 sec;   INR: 1.06 ratio         PTT - ( 03 Jan 2022 07:12 )  PTT:30.8 sec    Arterial Blood Gas:  01-05 @ 00:27  7.39/34/75/21/96.7/-3.8  ABG lactate: --        MICROBIOLOGY:     RADIOLOGY:  [ ] Reviewed and interpreted by me    EKG:

## 2022-01-05 NOTE — CHART NOTE - NSCHARTNOTEFT_GEN_A_CORE
RRT called for respiratory distress and hypoxia. Pt's pulse ox decreased to 84-86%. Nebulizers x1. Pt  placed on 12 L NRBR with impro RRT called for respiratory distress and hypoxia. Pt's pulse ox decreased to 84-86%. Nebulizers x1. CXR stat. Pt  placed on 12 L NRBR with improvement 90-93%, but pt still tachypneic. D/w Dr. Segundo, CTA was ordered, RRT was called.  BIPAP 10/5 40%, RR16, 500 cc LR for BP 86/49, with improvement. MICU consult called, MICU attending to the bedside. Will continue to monitor and if remains tachypneic on BIPAP, will be reevaluated by MICU. Pt's son Luis Scott notified about current pt's status.     Mel Stahl NP, #[id number]

## 2022-01-05 NOTE — CONSULT NOTE ADULT - ASSESSMENT
86 year old female, PMH COPD (not on home oxygen), CVA with residual left weakness (~3 years ago), PEG for dysphagia, hypothyroid, COPD (on no home Oxygen), HTN, gout, presenting with SOB/fevers, found to have ruptured appendicitis. MICU consulted for worsening hypoxic respiratory failure.     #Hypoxia  -CXR w complete opacification of L lung  -Improved on Bipap, (satting well, tidal volume 400-500, no increased WOB), does not require MICU at this time.   -continue nebulized saline and duonebs PRN (can take break off Bipap for saline nebs)   -repeat chest xray in AM  -aggressive chest PT

## 2022-01-05 NOTE — CHART NOTE - NSCHARTNOTESELECT_GEN_ALL_CORE
Event Note
Event Note
GOC/Event Note
IR
Event Note
IR
IR
Infectious Diseases/Event Note
Nutrition Services
VIR/Off Service Note

## 2022-01-06 NOTE — CONSULT NOTE ADULT - REASON FOR ADMISSION
fevers/sob
sob
perforated appendicitis with abscess formation
fevers/sob

## 2022-01-06 NOTE — PROVIDER CONTACT NOTE (CRITICAL VALUE NOTIFICATION) - ASSESSMENT
Pt is axox3, asymptomatic, no distress present.
VSS
A&Ox3/4. VSS on highflow& continuous pulse ox.
pt currently on vancomycin, flagyl and cefepime IVPB

## 2022-01-06 NOTE — PROVIDER CONTACT NOTE (CRITICAL VALUE NOTIFICATION) - NAME OF MD/NP/PA/DO NOTIFIED:
DANILO Farris
Yesenia Beltran M.D.
Yesenia Beltran MD
Dr Anne
DR Kwon, Hyeokchan
Dr. Yesenia Beltran Team 5.

## 2022-01-06 NOTE — PRE-ANESTHESIA EVALUATION ADULT - NSANTHPEFT_GEN_ALL_CORE
PHYSICAL EXAM:  GENERAL: obese  CHEST/LUNG: dyspneic on long sentences, minimal effort  PSYCH/NEURO: AAOx3

## 2022-01-06 NOTE — CONSULT NOTE ADULT - ASSESSMENT
85 yo F from [facility], CVA with residual left weakness (~3 years ago), PEG for dysphagia placed 2017, hypothyroid, COPD (on no home Oxygen), HTN, gout, hx of breast cancer p/w fever/sob. Found to have perforated appendicitis w/ abscess s/p IR drainage on 12/23, currently on Vancomycin and Invanz. RRT yesterday for worsening hypoxia requiring HFNC, now s/p bronch.    Impression:  #Chronic macrocytic anemia - no s/s of GI bleeding. CBC this AM repeated with hgb at baseline. Ddx includes thyroid disease, b12 or folate deficiency, ?met breast cancer.  #Hypoxic respiratory failure  #Perforated appendicitis  #CVA s/p PEG    Recommendation:  - check B12, folate, TSH  - macrocytic however can also check iron panel and ferritin for anemia w/u  - transfuse as needed for hgb<7  - no indication for endoscopy at this time - patient also has high oxygen requirements and is high risk for any endoscopic evaluation  - please call back with questions    Maria Luz Parker PGY-5  Gastroenterology Fellow  Pager #[phone] ([facility]) or [phone] (NS)  Available on [employer] Teams.  Please contact on-call GI fellow via answering service ([phone]) after 5pm and before 8am, and on weekends.

## 2022-01-06 NOTE — PRE PROCEDURE NOTE - PRE PROCEDURE EVALUATION
Interventional Radiology    HPI: 86 year old Female with a dislodged G-tube over the weekend. Xray Gastrografin study opacifies stomach and proximal small bowel without evidence of extraluminal contrast. Plan for reinsertion today.    Allergies: Toradol (Urticaria (Mild to Mod); Rash (Mild to Mod))    Medications (Abx/Cardiac/Anticoagulation/Blood Products)    ertapenem  IVPB: 120 mL/Hr IV Intermittent (01-02 @ 14:02)  heparin   Injectable: 5000 Unit(s) SubCutaneous (01-02 @ 21:54)  metoprolol tartrate: 50 milliGRAM(s) Oral (01-01 @ 09:15)  metoprolol tartrate Injectable: 5 milliGRAM(s) IV Push (01-01 @ 19:23)  metoprolol tartrate Injectable: 5 milliGRAM(s) IV Push (01-03 @ 05:23)  vancomycin  IVPB: 250 mL/Hr IV Intermittent (01-02 @ 11:48)    Data:  T(C): 36.4  HR: 96  BP: 109/67  RR: 19  SpO2: 93%    Exam  General: No acute distress  Chest: Non labored breathing  Abdomen: Non-distended  Extremities: No swelling, warm    -WBC 5.66 / HgB 8.6 / Hct 28.1 / Plt 256  -Na 137 / Cl 103 / BUN 10 / Glucose 157  -K 4.1 / CO2 21 / Cr 0.56  -ALT -- / Alk Phos -- / T.Bili --  -INR1.06    Imaging: < from: Xray Feeding Tube Check SI (01.01.22 @ 19:12) >  INTERPRETATION:  Chest and abdomen feeding tube check    HISTORY: G-tube reinsertion    Frontal view of the abdomen was obtained after injection of contrast   material via a gastric tube. The contrast opacifies a collapsed stomach   lumen and passes to the level of the fourth portion of the duodenum. No   extravasation is identified.    There may be a small diverticulum of the third portion of the duodenum.    IMPRESSION:  Gastric tube to stomach.    < end of copied text >      Plan: 86y Female presents for G-tube reinsertion  -Risks/Benefits/alternatives explained with the patient and/or healthcare proxy and witnessed informed consent obtained.   
Attending Physician:    Dr. Marcano                        Procedure:   Bronchoscopy    Indication for Procedure:   Dyspnea  ________________________________________________________  PAST MEDICAL & SURGICAL HISTORY:    MI (myocardial infarction)  HTN (hypertension)  Personal history of asbestosis  Gout  Dyslipidemia  UTI (lower urinary tract infection)  ETOH abuse  CVA (cerebral vascular accident)  History of left shoulder fracture  History of benign breast tumor      ALLERGIES:  Toradol (Urticaria (Mild to Mod); Rash (Mild to Mod))    HOME MEDICATIONS:  acetaminophen 160 mg/5 mL oral suspension: 20 milliliter(s) orally every 6 hours, As needed, Moderate Pain (4 - 6), Severe Pain (7 - 10)  atorvastatin 20 mg oral tablet: 1 tab(s) orally once a day  Biotene Moisturizing Mouth oral spray: 2 spray(s) orally 3 times a day, As Needed  ertapenem 1 g injection: 1 gram(s) intravenous once a day  folic acid 1 mg oral tablet: 1 tab(s) orally once a day  heparin: 5000 unit(s) subcutaneous every 8 hours  ipratropium-albuterol 0.5 mg-2.5 mg/3 mL inhalation solution: 3 milliliter(s) inhaled every 6 hours  levothyroxine 75 mcg (0.075 mg) oral tablet: 1 tab(s) orally once a day  melatonin 3 mg oral tablet: 1 tab(s) orally once a day (at bedtime), As needed, Insomnia  nystatin 100,000 units/g topical cream: 1 application topically 2 times a day  ocular lubricant ophthalmic solution: 2 drop(s) to each affected eye 3 times a day  vancomycin 1 g intravenous injection: 1 gram(s) intravenous every 24 hours  Vitamin B12 1000 mcg oral tablet: 1 tab(s) orally once a day    AICD/PPM: [ ] yes   [ X] no    PERTINENT LAB DATA:                        8.6    5.06  )-----------( 272      ( 06 Jan 2022 07:07 )             29.1     01-06    140  |  105  |  8   ----------------------------<  146<H>  4.2   |  22  |  0.56    Ca    9.5      06 Jan 2022 06:06  Phos  4.1     01-05  Mg     1.9     01-05  TPro  6.5  /  Alb  3.3  /  TBili  0.3  /  DBili  x   /  AST  17  /  ALT  20  /  AlkPhos  86  01-06    PHYSICAL EXAMINATION:      Weight (kg): 109.5T(C): 36.1  HR: 84  BP: 109/47  RR: 21  SpO2: 98%    Constitutional: NAD    Neck:  No JVD  Respiratory: Pt received on High flow/non-rebreather due to dyspnea. Pt is shallow breathing and course bilaterally.  Cardiovascular: S1 and S2  Gastrointestinal: BS+, soft, NT/ND  Extremities: No peripheral edema  Neurological: A/O x 4      COMMENTS:    The patient is a suitable candidate for the planned procedure unless box checked [ ]  No, explain:    
Interventional Radiology    HPI: This is a 86 year old female w/ hx of CVA w/ PEG for dysphagia p/w fever and shortness of breath, found to have RLQ fluid c/w perforated appendicitis now s/p IR drain placement on 12/23 with Dr. Marti. Plan for for PICC placement and drain evaluation today.      Allergies: Toradol (Urticaria (Mild to Mod); Rash (Mild to Mod))    Medications (Abx/Cardiac/Anticoagulation/Blood Products)  amLODIPine   Tablet: 10 milliGRAM(s) Oral (12-30 @ 05:54)  cefepime   IVPB: 100 mL/Hr IV Intermittent (12-30 @ 05:56)  ertapenem  IVPB: 120 mL/Hr IV Intermittent (12-30 @ 15:10)  heparin   Injectable: 5000 Unit(s) SubCutaneous (12-30 @ 15:20)  metoprolol tartrate: 50 milliGRAM(s) Oral (12-30 @ 05:54)  metroNIDAZOLE  IVPB: 100 mL/Hr IV Intermittent (12-30 @ 02:05)  vancomycin  IVPB: 166.67 mL/Hr IV Intermittent (12-28 @ 17:43)  vancomycin  IVPB: 166.67 mL/Hr IV Intermittent (12-29 @ 09:33)    Data:  T(C): 36.7  HR: 111  BP: 123/68  RR: 18  SpO2: 97%    Exam  General: No acute distress  Chest: Non labored breathing  Abdomen: Non-distended  Extremities: No swelling, warm    -WBC 8.61 / HgB 9.6 / Hct 31.9 / Plt 229  -Na 132 / Cl 98 / BUN 19 / Glucose 171  -K 4.6 / CO2 22 / Cr 0.66  -ALT 51 / Alk Phos 99 / T.Bili 0.3  -INR1.04    Imaging: < from: CT Abdomen and Pelvis No Cont (12.30.21 @ 14:12) >    IMPRESSION:  Interval decrease in size of right lower quadrant fluid collection,   status post placement of drainage catheter.    Complete left lower lobe atelectasis with secretions/debris in the   airways again noted.    Redemonstration of an indeterminant lytic lesion in the right acetabulum.    < end of copied text >    Plan: 86y Female presents for PICC placement and drain evaluation  -Risks/Benefits/alternatives explained with the patient and/or healthcare proxy and witnessed informed consent obtained.

## 2022-01-06 NOTE — PRE-ANESTHESIA EVALUATION ADULT - NSANTHPMHFT_GEN_ALL_CORE
86 hx CVA w/ left weakness, PEG, hypothyroidism, HTN, gout, recent perforated appendix, CAD/MI, AS, APF, presenting for bronchoscopy. Patient requiring high flow oxygen on floors. Denies chest pain. Plan for general anesthesia.
85 yo F from [facility], CVA with residual left weakness (~3 years ago), PEG for dysphagia, hypothyroid, COPD (on no home Oxygen), HTN, gout, p/w fever, Pt has respiratory distress, wheezing, concerning for respiratory infection now on 3L (down from bipap in ED), but with GPC in blood and RLQ abscess

## 2022-01-06 NOTE — PROVIDER CONTACT NOTE (CRITICAL VALUE NOTIFICATION) - BACKGROUND
Pt w/ resp distress & perforated appendix. S/p RRT for hypoxia/ tachycardia
dx: fever due to unspecified condition
Pt admitted for fevers, with Hx of CVA, ETOH abuse, UTI and Gout.

## 2022-01-06 NOTE — PROVIDER CONTACT NOTE (CRITICAL VALUE NOTIFICATION) - PERSON GIVING RESULT:
Tonio
Dulce Yip/[facility]
Tanya Saldana
[facility], Teresita Emanuel
Noah Alicea, LAB
Monet from [facility]

## 2022-01-06 NOTE — CONSULT NOTE ADULT - SUBJECTIVE AND OBJECTIVE BOX
Chief Complaint:  Patient is a 86y old  Female who presents with a chief complaint of fevers/sob (06 Jan 2022 08:39)      HPI: 87 yo F from [facility], CVA with residual left weakness (~3 years ago), PEG for dysphagia placed 2017, hypothyroid, COPD (on no home Oxygen), HTN, gout p/w fever/sob. Found to have perforated appendicitis w/ abscess s/p IR drainage on 12/23, currently on Vancomycin and Invanz. RRT yesterday for worsening hypoxia with increasing oxygen requirements, as well as tachycardia. Now on HFNC. GI consulted for anemia. Patient has chronic macrocytic anemia and had falsely low hgb this AM. No overt bleeding.      Allergies:  Toradol (Urticaria (Mild to Mod); Rash (Mild to Mod))      Home Medications:    Hospital Medications:  acetaminophen     Tablet .. 650 milliGRAM(s) Oral every 6 hours PRN  acetylcysteine 20%  Inhalation 4 milliLiter(s) Inhalation three times a day  albuterol/ipratropium for Nebulization 3 milliLiter(s) Nebulizer every 6 hours  allopurinol 100 milliGRAM(s) Oral daily  artificial  tears Solution 1 Drop(s) Both EYES two times a day  artificial tears (preservative free) Ophthalmic Solution 1 Drop(s) Both EYES four times a day  atorvastatin 20 milliGRAM(s) Oral at bedtime  chlorhexidine 4% Liquid 1 Application(s) Topical <User Schedule>  dextrose 40% Gel 15 Gram(s) Oral once  dextrose 5% + sodium chloride 0.9%. 1000 milliLiter(s) IV Continuous <Continuous>  dextrose 5%. 1000 milliLiter(s) IV Continuous <Continuous>  dextrose 5%. 1000 milliLiter(s) IV Continuous <Continuous>  dextrose 50% Injectable 25 Gram(s) IV Push once  dextrose 50% Injectable 12.5 Gram(s) IV Push once  dextrose 50% Injectable 25 Gram(s) IV Push once  glucagon  Injectable 1 milliGRAM(s) IntraMuscular once  heparin   Injectable 5000 Unit(s) SubCutaneous every 8 hours  insulin lispro (ADMELOG) corrective regimen sliding scale   SubCutaneous every 6 hours  levETIRAcetam  Solution 750 milliGRAM(s) Oral two times a day  levothyroxine 75 MICROGram(s) Oral daily  melatonin 3 milliGRAM(s) Oral at bedtime PRN  metoprolol tartrate 50 milliGRAM(s) Oral two times a day  nystatin Cream 1 Application(s) Topical two times a day  sodium chloride 0.9% lock flush 10 milliLiter(s) IV Push every 1 hour PRN  sodium chloride 3%  Inhalation 4 milliLiter(s) Inhalation every 6 hours  vancomycin  IVPB 1000 milliGRAM(s) IV Intermittent every 24 hours      PMHX/PSHX:  MI (myocardial infarction)    HTN (hypertension)    Personal history of asbestosis    Gout    Dyslipidemia    UTI (lower urinary tract infection)    ETOH abuse    CVA (cerebral vascular accident)    History of left shoulder fracture    History of benign breast tumor        Family history:  No pertinent family history in first degree relatives        Social History:     ROS:         PHYSICAL EXAM:       Vital Signs:  Vital Signs Last 24 Hrs  T(C): 36.7 (06 Jan 2022 09:12), Max: 37.2 (05 Jan 2022 21:00)  T(F): 98 (06 Jan 2022 09:12), Max: 98.9 (05 Jan 2022 21:00)  HR: 88 (06 Jan 2022 09:12) (88 - 96)  BP: 121/61 (06 Jan 2022 09:12) (120/52 - 147/82)  BP(mean): --  RR: 22 (06 Jan 2022 09:12) (20 - 22)  SpO2: 95% (06 Jan 2022 06:05) (94% - 100%)  Daily     Daily     LABS:                        8.6    5.06  )-----------( 272      ( 06 Jan 2022 07:07 )             29.1     01-06    140  |  105  |  8   ----------------------------<  146<H>  4.2   |  22  |  0.56    Ca    9.5      06 Jan 2022 06:06  Phos  4.1     01-05  Mg     1.9     01-05    TPro  6.5  /  Alb  3.3  /  TBili  0.3  /  DBili  x   /  AST  17  /  ALT  20  /  AlkPhos  86  01-06    LIVER FUNCTIONS - ( 06 Jan 2022 06:06 )  Alb: 3.3 g/dL / Pro: 6.5 g/dL / ALK PHOS: 86 U/L / ALT: 20 U/L / AST: 17 U/L / GGT: x                   Imaging:             Chief Complaint:  Patient is a 86y old  Female who presents with a chief complaint of fevers/sob (06 Jan 2022 08:39)      HPI: 85 yo F from [facility], CVA with residual left weakness (~3 years ago), PEG for dysphagia placed 2017, hypothyroid, COPD (on no home Oxygen), HTN, gout p/w fever/sob. Found to have perforated appendicitis w/ abscess s/p IR drainage on 12/23, currently on Vancomycin and Invanz. RRT yesterday for worsening hypoxia with increasing oxygen requirements, as well as tachycardia. Now on HFNC. GI consulted for anemia. Patient has chronic macrocytic anemia and had falsely low hgb this AM. No overt bleeding.      Allergies:  Toradol (Urticaria (Mild to Mod); Rash (Mild to Mod))      Home Medications:    Hospital Medications:  acetaminophen     Tablet .. 650 milliGRAM(s) Oral every 6 hours PRN  acetylcysteine 20%  Inhalation 4 milliLiter(s) Inhalation three times a day  albuterol/ipratropium for Nebulization 3 milliLiter(s) Nebulizer every 6 hours  allopurinol 100 milliGRAM(s) Oral daily  artificial  tears Solution 1 Drop(s) Both EYES two times a day  artificial tears (preservative free) Ophthalmic Solution 1 Drop(s) Both EYES four times a day  atorvastatin 20 milliGRAM(s) Oral at bedtime  chlorhexidine 4% Liquid 1 Application(s) Topical <User Schedule>  dextrose 40% Gel 15 Gram(s) Oral once  dextrose 5% + sodium chloride 0.9%. 1000 milliLiter(s) IV Continuous <Continuous>  dextrose 5%. 1000 milliLiter(s) IV Continuous <Continuous>  dextrose 5%. 1000 milliLiter(s) IV Continuous <Continuous>  dextrose 50% Injectable 25 Gram(s) IV Push once  dextrose 50% Injectable 12.5 Gram(s) IV Push once  dextrose 50% Injectable 25 Gram(s) IV Push once  glucagon  Injectable 1 milliGRAM(s) IntraMuscular once  heparin   Injectable 5000 Unit(s) SubCutaneous every 8 hours  insulin lispro (ADMELOG) corrective regimen sliding scale   SubCutaneous every 6 hours  levETIRAcetam  Solution 750 milliGRAM(s) Oral two times a day  levothyroxine 75 MICROGram(s) Oral daily  melatonin 3 milliGRAM(s) Oral at bedtime PRN  metoprolol tartrate 50 milliGRAM(s) Oral two times a day  nystatin Cream 1 Application(s) Topical two times a day  sodium chloride 0.9% lock flush 10 milliLiter(s) IV Push every 1 hour PRN  sodium chloride 3%  Inhalation 4 milliLiter(s) Inhalation every 6 hours  vancomycin  IVPB 1000 milliGRAM(s) IV Intermittent every 24 hours      PMHX/PSHX:  MI (myocardial infarction)    HTN (hypertension)    Personal history of asbestosis    Gout    Dyslipidemia    UTI (lower urinary tract infection)    ETOH abuse    CVA (cerebral vascular accident)    History of left shoulder fracture    History of benign breast tumor        Family history:  No pertinent family history in first degree relatives        Social History:     ROS: unable to obtain, patient sedated after bronchoscopy        PHYSICAL EXAM:   GENERAL: obese, chronically ill appearing, face mask in place  HEAD:  Atraumatic, Normocephalic  NECK: Supple  CHEST/LUNG: b/l coarse breath sounds  HEART: Regular rate and rhythm  ABDOMEN: Soft, Nondistended; PEG in place  EXTREMITIES:  2+ Peripheral Pulses, No clubbing, cyanosis, or edema  PSYCH: sedated  NEUROLOGY: sedated  SKIN: No rashes or lesions    Vital Signs:  Vital Signs Last 24 Hrs  T(C): 36.7 (06 Jan 2022 09:12), Max: 37.2 (05 Jan 2022 21:00)  T(F): 98 (06 Jan 2022 09:12), Max: 98.9 (05 Jan 2022 21:00)  HR: 88 (06 Jan 2022 09:12) (88 - 96)  BP: 121/61 (06 Jan 2022 09:12) (120/52 - 147/82)  BP(mean): --  RR: 22 (06 Jan 2022 09:12) (20 - 22)  SpO2: 95% (06 Jan 2022 06:05) (94% - 100%)  Daily     Daily     LABS:                        8.6    5.06  )-----------( 272      ( 06 Jan 2022 07:07 )             29.1     01-06    140  |  105  |  8   ----------------------------<  146<H>  4.2   |  22  |  0.56    Ca    9.5      06 Jan 2022 06:06  Phos  4.1     01-05  Mg     1.9     01-05    TPro  6.5  /  Alb  3.3  /  TBili  0.3  /  DBili  x   /  AST  17  /  ALT  20  /  AlkPhos  86  01-06    LIVER FUNCTIONS - ( 06 Jan 2022 06:06 )  Alb: 3.3 g/dL / Pro: 6.5 g/dL / ALK PHOS: 86 U/L / ALT: 20 U/L / AST: 17 U/L / GGT: x                   Imaging:

## 2022-01-06 NOTE — CONSULT NOTE ADULT - CONSULT REQUESTED DATE/TIME
19-Dec-2021
20-Dec-2021 10:59
20-Dec-2021 13:47
05-Jan-2022 01:31
06-Jan-2022 09:43
20-Dec-2021 12:23
05-Jan-2022 09:08

## 2022-01-06 NOTE — PROGRESS NOTE ADULT - SUBJECTIVE AND OBJECTIVE BOX
CARDIOLOGY     PROGRESS  NOTE   ________________________________________________    CHIEF COMPLAINT:Patient is a 86y old  Female who presents with a chief complaint of fevers/sob (06 Jan 2022 09:43)  no complain.  	  REVIEW OF SYSTEMS:  CONSTITUTIONAL: No fever, weight loss, or fatigue  EYES: No eye pain, visual disturbances, or discharge  ENT:  No difficulty hearing, tinnitus, vertigo; No sinus or throat pain  NECK: No pain or stiffness  RESPIRATORY: No cough, wheezing, chills or hemoptysis; No Shortness of Breath  CARDIOVASCULAR: No chest pain, palpitations, passing out, dizziness, or leg swelling  GASTROINTESTINAL: No abdominal or epigastric pain. No nausea, vomiting, or hematemesis; No diarrhea or constipation. No melena or hematochezia.  GENITOURINARY: No dysuria, frequency, hematuria, or incontinence  NEUROLOGICAL: No headaches, memory loss, loss of strength, numbness, or tremors  SKIN: No itching, burning, rashes, or lesions   LYMPH Nodes: No enlarged glands  ENDOCRINE: No heat or cold intolerance; No hair loss  MUSCULOSKELETAL: No joint pain or swelling; No muscle, back, or extremity pain  PSYCHIATRIC: No depression, anxiety, mood swings, or difficulty sleeping  HEME/LYMPH: No easy bruising, or bleeding gums  ALLERGY AND IMMUNOLOGIC: No hives or eczema	    [ ] All others negative	  [ ] Unable to obtain    PHYSICAL EXAM:  T(C): 37.1 (01-06-22 @ 16:39), Max: 37.2 (01-05-22 @ 21:00)  HR: 90 (01-06-22 @ 18:00) (71 - 104)  BP: 102/61 (01-06-22 @ 16:39) (102/61 - 147/82)  RR: 20 (01-06-22 @ 16:39) (17 - 24)  SpO2: 98% (01-06-22 @ 18:00) (94% - 100%)  Wt(kg): --  I&O's Summary    05 Jan 2022 07:01  -  06 Jan 2022 07:00  --------------------------------------------------------  IN: 550 mL / OUT: 760 mL / NET: -210 mL    06 Jan 2022 07:01  -  06 Jan 2022 18:31  --------------------------------------------------------  IN: 0 mL / OUT: 300 mL / NET: -300 mL        Appearance: Normal	  HEENT:   Normal oral mucosa, PERRL, EOMI	  Lymphatic: No lymphadenopathy  Cardiovascular: Normal S1 S2, No JVD, +murmurs, No edema  Respiratory: Lungs clear to auscultation	  Psychiatry: A & O x 3, Mood & affect appropriate  Gastrointestinal:  Soft, Non-tender, + BS	  Skin: No rashes, No ecchymoses, No cyanosis	  Neurologic: Non-focal  Extremities: Normal range of motion, No clubbing, cyanosis or edema  Vascular: Peripheral pulses palpable 2+ bilaterally    MEDICATIONS  (STANDING):  acetylcysteine 20%  Inhalation 4 milliLiter(s) Inhalation three times a day  albuterol/ipratropium for Nebulization 3 milliLiter(s) Nebulizer every 6 hours  allopurinol 100 milliGRAM(s) Oral daily  artificial  tears Solution 1 Drop(s) Both EYES two times a day  artificial tears (preservative free) Ophthalmic Solution 1 Drop(s) Both EYES four times a day  atorvastatin 20 milliGRAM(s) Oral at bedtime  chlorhexidine 4% Liquid 1 Application(s) Topical <User Schedule>  dextrose 40% Gel 15 Gram(s) Oral once  dextrose 5% + sodium chloride 0.9%. 1000 milliLiter(s) (50 mL/Hr) IV Continuous <Continuous>  dextrose 5%. 1000 milliLiter(s) (50 mL/Hr) IV Continuous <Continuous>  dextrose 5%. 1000 milliLiter(s) (100 mL/Hr) IV Continuous <Continuous>  dextrose 50% Injectable 25 Gram(s) IV Push once  dextrose 50% Injectable 12.5 Gram(s) IV Push once  dextrose 50% Injectable 25 Gram(s) IV Push once  glucagon  Injectable 1 milliGRAM(s) IntraMuscular once  heparin   Injectable 5000 Unit(s) SubCutaneous every 8 hours  insulin lispro (ADMELOG) corrective regimen sliding scale   SubCutaneous every 6 hours  levETIRAcetam  Solution 750 milliGRAM(s) Oral two times a day  levothyroxine 75 MICROGram(s) Oral daily  metoprolol tartrate 50 milliGRAM(s) Oral two times a day  nystatin Cream 1 Application(s) Topical two times a day  sodium chloride 3%  Inhalation 4 milliLiter(s) Inhalation every 6 hours      TELEMETRY: 	    ECG:  	  RADIOLOGY:  OTHER: 	  	  LABS:	 	    CARDIAC MARKERS:                                8.6    5.06  )-----------( 272      ( 06 Jan 2022 07:07 )             29.1     01-06    140  |  105  |  8   ----------------------------<  146<H>  4.2   |  22  |  0.56    Ca    9.5      06 Jan 2022 06:06  Phos  4.1     01-05  Mg     1.9     01-05    TPro  6.5  /  Alb  3.3  /  TBili  0.3  /  DBili  x   /  AST  17  /  ALT  20  /  AlkPhos  86  01-06    proBNP: Serum Pro-Brain Natriuretic Peptide: 375 pg/mL (12-19 @ 08:45)    Lipid Profile:   HgA1c:   TSH:     no shortness of breath or hypoxemia on 100% high flow nasal canula  repeat CXR with persistent left lung collapse -> bronchoscopy this AM - will need to go down to endoscopy on 100% NRB  restart NIV for increased work of breathing, resistant hypoxemia or respiratory acidosis  continue albuterol/atrovent nebs q6h after bronchoscopy  continue hypertonic saline and mucomyst nebs to facilitate mucous clearance after bronchoscopy  continue chest vest therapy after discharge  continue aggressive manual chest PT  continue vancomycin x 6 weeks for methicillin resistant Staphylococcus epidermidis bacteremia - ID follow-up - off ertapenem  cardiac meds: lopressor/lipitor  DVT prophylaxis - SQ heparin  allopurinol/keppra/synthroid  glucose control    2) Positive BCX, Therapeutic Drug Monitoring  BCx (12/19) with 3/4 Staph epi  BCx (12/21) with 2/4 Staph epi  BCx (12/24) with NGTD  TTE (12/21) with possible vegetation on the left or non-coronary cusp of the aortic valve  --cont Vancomycin 1gm IV Q24H  --Check weekly trough  --cbc, bun/creatinine, vanco trough once weekly while on abx   --Continue to monitor renal function and vancomycin levels to avoid nephrotoxicity / ototoxicity secondary to vancomycin  --Discussed with Cardiology- high risk for MIKALA. Will treat empirically for IE  --surveillance bcx post iv abx   --picc  --total 6 weeks abx - day 13 of 42 of abx     3) Leukocytosis, Fever  - Trend to normal  -better    4) Mucus plugging    Assessment and plan  ---------------------------  87 yo F from [facility] rehab, CVA with residual left weakness (~3 years ago), PEG for dysphagia, hypothyroid, COPD (on no home Oxygen), HTN, gout, p/w fever, Pt has respiratory distress, wheezing, concerning for respiratory infection, otherwise no other symptoms is reported on the chart. EMS gave solumedrol 125 through peripheral, small iv line.  pt is well known to me with hx of htn, ashd, s/p MI, cva with increasing sob on bipap in er.  can not get any hx from the pt.  sob ?respiratory failure ?sec to pneumoniae  ?pleural effusion, check ct chest noted  continue bp meds  dvt prophylaxis  will consider to possible repeat echo  duoneb  dvt prophylaxis  ct scan/ surgery/ ID noted  continue amlodipine and Metoprolol for now  tachycardia sec to underlying condition, continue to observe  oob to chair as tolerated  replete K  abx as per ID on vanco fu level  repeat blood cultures, negative  will repeat  TTE in 6 weeks  on iv beta blocker for bp/hr control  events noted  ?mucus pluge/ bronch/ pulmonary appreciated  observe closely

## 2022-01-06 NOTE — PROVIDER CONTACT NOTE (CRITICAL VALUE NOTIFICATION) - TEST AND RESULT REPORTED:
BCX 12/22 preliminary growth in anarobic bottle for gram positive cocci in clusters.
prelim blood culture 12/19 growth in aerobic bottle gram + cocci in clusters
BC + from 12/21/21 preliminary results gram + cocci in clusters
Positive Blood Cx from 12/21 final results
Blood Culture (12/19)
Hemoglobin/ Hematocrit

## 2022-01-06 NOTE — PROVIDER CONTACT NOTE (CRITICAL VALUE NOTIFICATION) - SITUATION
BCX 12/22 preliminary growth in anarobic bottle for gram positive cocci in clusters.
No new orders
[facility] called to confirm positive BC drawn on 12/19. Preliminary growth positive for Gram + cocci in clusters
Pt Hemoglobin/ Hematocrit resulted 4.3& 15.8
prelim blood culture 12/19 growth in aerobic bottle gram + cocci in clusters
Positive Blood Cx from 12/21 final results, anaerobic bottle positive for Staphylococcus epidermis. Aerobic bottle gram positive rods.

## 2022-01-06 NOTE — CONSULT NOTE ADULT - ATTENDING COMMENTS
Patient seen and examined. Agree with above. s/p bronchoscopy. Chronic anemia with one false low Hb with repeat stable Hb without need for PRBC transfusion. No signs/symptoms of active/overt GI bleed. Iron studies. No indication for urgent endoscopic intervention at this time unless with evidence of overt/active GI bleeding. Trend CBC.

## 2022-01-06 NOTE — PROGRESS NOTE ADULT - SUBJECTIVE AND OBJECTIVE BOX
[facility] [place] - PROGRESS NOTE    CHIEF COMPLAINT: acute hypoxic respiratory failure; mucous plugging; left lung atelectasis; weak cough; dysphagia; h/o CVA; perforated appendicitis with abscess formation    INTERVAL HISTORY: repeat CXR -> complete opacification of the left hemithorax with leftward tracheal shift c/w left lung collapse; no shortness of breath or hypoxemia on a 100% high flow nasal canula; occasional cough productive of scant sputum; no chest congestion or wheeze; no fevers, chills or sweats; no chest pain/pressure or palpitations;     REVIEW OF SYSTEMS:  Constitutional: As per interval history  HEENT: Within normal limits  CV: As per interval history  Resp: As per interval history  GI: dysphagia -> resolved - PEG in place but tolerates a regular diet  : Within normal limits  Musculoskeletal: Within normal limits  Skin: Within normal limits  Neurological: CVA -> left sided weakness  Psychiatric: Within normal limits  Endocrine: Within normal limits  Hematologic/Lymphatic: Within normal limits  Allergic/Immunologic: Within normal limits    MEDICATIONS:     Pulmonary "  acetylcysteine 20%  Inhalation 4 milliLiter(s) Inhalation three times a day  albuterol/ipratropium for Nebulization 3 milliLiter(s) Nebulizer every 6 hours  sodium chloride 3%  Inhalation 4 milliLiter(s) Inhalation every 6 hours    Anti-microbials:  vancomycin  IVPB 1000 milliGRAM(s) IV Intermittent every 24 hours    Cardiovascular:  metoprolol tartrate 50 milliGRAM(s) Oral two times a day    Other:  allopurinol 100 milliGRAM(s) Oral daily  artificial  tears Solution 1 Drop(s) Both EYES two times a day  artificial tears (preservative free) Ophthalmic Solution 1 Drop(s) Both EYES four times a day  atorvastatin 20 milliGRAM(s) Oral at bedtime  chlorhexidine 4% Liquid 1 Application(s) Topical <User Schedule>  dextrose 40% Gel 15 Gram(s) Oral once  dextrose 5% + sodium chloride 0.9%. 1000 milliLiter(s) IV Continuous <Continuous>  dextrose 5%. 1000 milliLiter(s) IV Continuous <Continuous>  dextrose 5%. 1000 milliLiter(s) IV Continuous <Continuous>  dextrose 50% Injectable 25 Gram(s) IV Push once  dextrose 50% Injectable 12.5 Gram(s) IV Push once  dextrose 50% Injectable 25 Gram(s) IV Push once  glucagon  Injectable 1 milliGRAM(s) IntraMuscular once  heparin   Injectable 5000 Unit(s) SubCutaneous every 8 hours  insulin lispro (ADMELOG) corrective regimen sliding scale   SubCutaneous every 6 hours  levETIRAcetam  Solution 750 milliGRAM(s) Oral two times a day  levothyroxine 75 MICROGram(s) Oral daily  nystatin Cream 1 Application(s) Topical two times a day    MEDICATIONS  (PRN):  acetaminophen     Tablet .. 650 milliGRAM(s) Oral every 6 hours PRN Temp greater or equal to 38C (100.4F), Mild Pain (1 - 3)  melatonin 3 milliGRAM(s) Oral at bedtime PRN Insomnia  sodium chloride 0.9% lock flush 10 milliLiter(s) IV Push every 1 hour PRN Pre/post blood products, medications, blood draw, and to maintain line patency    OBJECTIVE:    I&O's Detail    05 Jan 2022 07:01  -  06 Jan 2022 07:00  --------------------------------------------------------  IN:    dextrose 5% + sodium chloride 0.9%: 350 mL    Glucerna: 200 mL  Total IN: 550 mL    OUT:    Voided (mL): 760 mL  Total OUT: 760 mL    Total NET: -210 mL    POCT Blood Glucose.: 138 mg/dL (06 Jan 2022 05:55)  POCT Blood Glucose.: 117 mg/dL (05 Jan 2022 23:33)  POCT Blood Glucose.: 128 mg/dL (05 Jan 2022 18:07)  POCT Blood Glucose.: 136 mg/dL (05 Jan 2022 11:58)      PHYSICAL EXAM:       ICU Vital Signs Last 24 Hrs  T(C): 36.7 (06 Jan 2022 09:12), Max: 37.2 (05 Jan 2022 21:00)  T(F): 98 (06 Jan 2022 09:12), Max: 98.9 (05 Jan 2022 21:00)  HR: 88 (06 Jan 2022 09:12) (88 - 96)  BP: 121/61 (06 Jan 2022 09:12) (120/52 - 147/82)  BP(mean): --  ABP: --  ABP(mean): --  RR: 22 (06 Jan 2022 09:12) (20 - 22)  SpO2: 95% (06 Jan 2022 06:05) (94% - 100%) on 100% high flow nasal canula     General: Awake. Alert. Cooperative. No distress. Appears stated age 	  HEENT: Atraumatic. Normocephalic. Anicteric. Normal oral mucosa. PERRL. EOMI.  Neck: Supple. Trachea midline. Thyroid without enlargement/tenderness/nodules. No carotid bruit. No JVD. Short and wide  Cardiovascular: Regular rate and rhythm. S1 S2 normal. III/VI systolic murmur  Respiratory: Respirations unlabored. Decreased breath sounds left hemithorax. No curvature.  Abdomen: Soft. Non-tender. Non-distended. No organomegaly. No masses. Normal bowel sounds. Obese, PEG.  Extremities: Warm to touch. No clubbing or cyanosis. No pedal edema.   Pulses: 2+ peripheral pulses all extremities.	  Skin: Normal skin color. No rashes or lesions. No ecchymoses. No cyanosis. Warm to touch.  Lymph Nodes: Cervical, supraclavicular and axillary nodes normal  Neurological: Left lower extremity plegia with contraction. Left upper extremity is quite weak. A and O x 3  Psychiatry: Appropriate mood and affect.    LABS:                          8.6    5.06  )-----------( 272      ( 06 Jan 2022 07:07 )             29.1     CBC    WBC  5.06 <==, 2.70 <==, 5.05 <==, 6.78 <==, 5.38 <==, 5.66 <==, 6.50 <==    Hemoglobin  8.6 <<==, 4.3 <<==, 8.7 <<==, 9.3 <<==, 9.3 <<==, 8.6 <<==, 9.2 <<==    Hematocrit  29.1 <==, 15.8 <==, 29.3 <==, 30.6 <==, 30.7 <==, 28.1 <==, 29.1 <==    Platelets  272 <==, 129 <==, 277 <==, 298 <==, 266 <==, 256 <==, 257 <==      140  |  105  |  8   ----------------------------<  146<H>    01-06  4.2   |  22  |  0.56      LYTES    sodium  140 <==, 144 <==, 139 <==, 137 <==, 134 <==, 133 <==    potassium   4.2 <==, 4.2 <==, 4.0 <==, 4.1 <==, 4.3 <==, 4.8 <==    chloride  105 <==, 107 <==, 105 <==, 103 <==, 100 <==, 98 <==    carbon dioxide  22 <==, 18 <==, 17 <==, 21 <==, 22 <==, 24 <==    =============================================================================================  RENAL FUNCTION:    Creatinine:   0.56  <<==, 0.62  <<==, 0.62  <<==, 0.56  <<==, 0.56  <<==, 0.65  <<==    BUN:   8 <==, 10 <==, 10 <==, 10 <==, 15 <==, 19 <==    ============================================================================================    calcium   9.5 <==, 9.3 <==, 9.4 <==, 8.7 <==, 8.8 <==, 8.8 <==    phos   4.1 <==    mag   1.9 <==    ============================================================================================  LFTs    AST:   17 <== , 14 <== , 14 <==     ALT:  20  <== , 22  <== , 26  <==     AP:  86  <=, 82  <=, 88  <=    Bili:  0.3  <=, 0.2  <=, 0.2  <=    PT/INR - ( 03 Jan 2022 07:12 )   PT: 12.7 sec;   INR: 1.06 ratio      Venous Blood Gas:  01-05 @ 06:53  7.32/46/45/24/73.0  VBG Lactate: 2.3    ABG - ( 05 Jan 2022 00:27 )  pH: 7.39  /  pCO2: 34    /  pO2: 75    / HCO3: 21    / Base Excess: -3.8  /  SaO2: 96.7      < from: Transthoracic Echocardiogram (12.21.21 @ 14:39) >    Patient name: FRANKI MEHTA  YOB: 1935   Age: 86 (F)   MR#: [id number]  Study Date: 12/21/2021  Location: [address] [place], [place] [id number]: Tena Garnett RDCS  Study quality: Technically difficult/Limited  Referring Physician: Edmond Almazan MD  Blood Pressure: 134/77 mmHg  Height: 163 cm  Weight: 109 kg  BSA: 2.1 m2  Heart Rate: 105 mmHg  ------------------------------------------------------------------------  PROCEDURE: Transthoracic echocardiogram with 2-D, M-Mode  and complete spectral andcolor flow Doppler.  INDICATION: Endocarditis, valve unspecified (I38)  ------------------------------------------------------------------------  Dimensions:    Normal Values:  LA:     3.6    2.0 - 4.0 cm  Ao:     2.7    2.0 - 3.8 cm  SEPTUM: 1.1    0.6 - 1.2 cm  PWT:    1.0    0.6 - 1.1 cm  LVIDd:  3.7    3.0 - 5.6 cm  LVIDs:  2.0    1.8 - 4.0 cm  Derived variables:  LVMI: 60 g/m2  RWT: 0.58  Fractional short: 46 %  EF (Visual Estimate): 70 %  Doppler Peak Velocity (m/sec): AoV=2.5  ------------------------------------------------------------------------  Observations:  Mitral Valve: Mitral annular calcification. Minimal mitral  regurgitation.  Aortic Valve/Aorta: Moderate aortic stenosis:  ---LVOTd 2.14 cm. VTI 18.0 cm.  ---Aortic valve VTI50.4 cm. Mean gradient 13.7 mmHg. DI =  0.36.  ---Aortic valve area by continuity 1.3 cm2.  There may be a vegetation on the left or non-coronary cusp.  Normal aortic root size.  Left Atrium: Normal left atrium.  Left Ventricle: Normal left ventricular internal dimensions  and wall thickness.  Normal left ventricular systolic function. No segmental  wall motion abnormalities. Indeterminate diastolic  function.  Right Heart: Normal right atrium. Normal right ventricular  size and function. Tricuspid valve not well visualized.  Minimal tricuspid regurgitation. Pulmonic valve not well  visualized. No pulmonic regurgitation.  Pericardium/Pleura: Normal pericardium with no pericardial  effusion.  Hemodynamic: No evidence of pulmonary hypertension.  ------------------------------------------------------------------------  Conclusions:  Normal left ventricular systolic function. No segmental  wall motion abnormalities.  Moderate aortic stenosis.  There may be a vegetation on the left or non-coronary cusp  of the aortic valve. Consider transesophageal  echocardiography.  ------------------------------------------------------------------------  Confirmed on  12/22/2021 - 10:32:40 by Rahul Correa M.D.  ------------------------------------------------------------------------  MICROBIOLOGY:     COVID-19 PCR . (01.04.22 @ 17:38)   COVID-19 PCR: NotDetec:     Culture - Blood (12.24.21 @ 17:38)   Specimen Source: .Blood Blood-Venous   Culture Results:   No Growth Final     Culture - Blood (12.24.21 @ 15:22)   Specimen Source: .Blood Blood-Peripheral   Culture Results:   No Growth Final     Culture - Abscess with Gram Stain (12.23.21 @ 18:49)   Specimen Source: .Abscess abdominal abscess   Culture Results:   Few Escherichia coli   Few Morganella morganii   Moderate Enterococcus avium   Numerous Bacteroides thetaiotamcron group "Susceptibilities not performed"   Numerous Clostridium ramosum "Susceptibilities not performed"     Culture - Blood (12.19.21 @ 14:24)   Culture Results:   Growth in aerobic bottle: Staphylococcus epidermidis   Organism Identification: Staphylococcus epidermidis     Culture - Blood (12.19.21 @ 14:24)   Gram Stain:   Growth in aerobic bottle: Gram Positive Cocci in Clusters   Growth in anaerobic bottle: Gram Positive Cocci in Clusters   - Staphylococcus epidermidis, Methicillin resistant: Detec     RADIOLOGY:  [x ] Chest radiographs reviewed and interpreted by me    CXR 1/6/2022 -> "to my eye" - complete opacification of the left hemithorax with leftward tracheal deviation c/w mucous plugging and left lung atelectasis  ---------------------------------------------------------------------------------------------------------------  CXR 1/5/2022 -> "to my eye" - complete opacification of the left hemithorax with leftward tracheal deviation c/w mucous plugging and left lung atelectasis    < from: Xray Chest 1 View- PORTABLE-Urgent (Xray Chest 1 View- PORTABLE-Urgent .) (01.05.22 @ 06:52) >    EXAM:  XR CHEST PORTABLE URGENT 1V                        EXAM:  XR CHEST PORTABLE URGENT 1V                          PROCEDURE DATE:  01/05/2022      INTERPRETATION:  EXAMINATION: XR CHEST URGENT, XR CHEST URGENT    CLINICAL INDICATION: Shortness of Breath, follow up    TECHNIQUE: Frontal, portable views of the chest were obtained at 1/4/2022   11:25 PM and 1/5/2022 6:49 AM    COMPARISON: Chest x-ray 12/25/2021.    FINDINGS:  Airway machine/device over left chest and neck.  Heart size cannot be assessed in this projection.  White out of the left lung with tracheal deviation towards the left,   consistent with left lung collapse. The right lung is clear.  There is no pneumothorax or pleural effusion.    IMPRESSION:  Left lung collapse.    HENRIQUE CRUZ MD; Resident Radiologist  This document has been electronically signed.  PRAVIN CASTILLO MD; Attending Radiologist  This document has been electronically signed. Jan 5 2022 10:11AM  --------------------------------------------------------------------------------------------------------------  < from: CT Chest w/ IV Cont (12.19.21 @ 21:39) >    EXAM:  CT ABDOMEN AND PELVIS IC                        EXAM:  CT CHEST IC                          PROCEDURE DATE:  12/19/2021      FINDINGS:    CHEST:  Motion degradation, particularly at the lung bases.    LUNGS AND LARGE AIRWAYS: Debris within the left lobar and more distal   airways of the left lower lobe. There is likely some debris within the   right lower lobe airways as well, though motion degradation limits   evaluation. Complete atelectasis of the left lower lobe. Subsegmental   atelectatic changes within the left upper lobe and dependent portions of   the right lung.  PLEURA: No pleural effusion. No pneumothorax.  VESSELS: Atherosclerotic changes. Coronary artery calcifications.  HEART: Heart size is normal. Aortic valve calcification. No pericardial   effusion.  MEDIASTINUM AND GIOVANA: No lymphadenopathy.  CHEST WALL AND LOWER NECK: Right mastectomy.    ABDOMEN AND PELVIS:  Motion degradation, most pronounced in the upper abdomen.    LIVER: Normal size and morphology. Subcentimeter calcified granuloma in   the right lobe. No suspicious liver lesion. Hepatic and portal veins are   patent.  BILE DUCTS: Hyperdensity in the region of the distal common bile duct   (series 3 image 130), which may reflect choledocholithiasis or adjacent   pancreatic parenchymal calcification. Bile ducts are of normal caliber,   with the common bile duct measuring up to 5 mm in diameter.  GALLBLADDER: Cholelithiasis.  SPLEEN: Within normal limits.  PANCREAS: Hypodense cystic appearing lesion measuring 1.0 cm in the   pancreatic tail (series 3 image 108), which is new since 7/2/2017, but is   unchanged since 5/28/2021. No main pancreatic duct dilation.  ADRENALS: Within normal limits.  KIDNEYS/URETERS: Within normal limits.    BLADDER: Within normal limits.  REPRODUCTIVEORGANS: Thickening of the endometrial complex at the fundus,   measuring up to 1.6 cm.    BOWEL:  Fluid collection within the right lower quadrant measuring 7.3 x 3.9 x   5.7 cm (series 3 image 209) with surrounding fatty infiltration. The   collection is associated with the tip of the appendix, which is   indistinct, with findings concerning for perforated appendicitis. No   pneumoperitoneum. There is a punctate 1 to 2 mm calcific density within   the collection (series 3 image 12), which may represent an appendicolith.    Small hiatal hernia. Percutaneous gastrostomy tube, the balloon located   within the gastric antrum. Duodenal diverticulum arising from the third   portion of the duodenum. Colonic diverticulosis. No bowel obstruction.    PERITONEUM: Right lower quadrant fluid collection, as described above.  VESSELS: Atherosclerotic changes.  RETROPERITONEUM/LYMPH NODES: No lymphadenopathy.  ABDOMINAL WALL: Small fat-containing umbilical hernia on a background of   diastases recti. Percutaneous gastrostomy tube.  BONES: Fixation hardware of the proximal left humerus. Degenerative   changes. Osseous demineralization. Mild to moderate loss of height of the   T11 and L1 vertebral bodies, unchanged since 2017.    Soft tissue density measuring 2.8 x 1.8 cm centered within the right   posterior medial acetabulum with associated erosion of the cortex (series   4 image 794). Findings are new since 7/2/2017.    IMPRESSION:  Findings concerning for perforated appendicitis with a right lower   quadrant collection associated with the appendiceal tip.    Complete atelectasis of the left lower lobe with debris occupying the   left lower lobe airways. An underlying pneumonia is not excluded.    Indeterminate lesion within the right acetabulum with associated osseous   erosion. Malignancy is not excluded.    Cholelithiasis and suspected choledocholithiasis. No biliary duct   dilation.    Thickening of the endometrial complex. Pelvic ultrasound may be performed   for further assessment.    Pancreatic tail lesion measuring 1 cm, possibly a side branch IPMN. This   can be further assessed with nonemergent contrast enhanced abdominal   MRI/MRCP.    Findings were discussed with Dr. Wade 12/20/2021 9:14 AM by Dr. Alan with readback confirmation.    NICANOR ALAN MD; Attending Radiologist  This document has been electronically signed. Dec 20 2021  9:17AM    ---------------------------------------------------------------------------------------------------------------

## 2022-01-06 NOTE — PROGRESS NOTE ADULT - SUBJECTIVE AND OBJECTIVE BOX
afebrile  REVIEW OF SYSTEMS:  GEN: no fever,    no chills  RESP: no SOB,   no cough  CVS: no chest pain,   no palpitations  GI: no abdominal pain,   no nausea,   no vomiting,   no constipation,   no diarrhea  : no dysuria,   no frequency  NEURO: no headache,   no dizziness  PSYCH: no depression,   not anxious  Derm : no rash    MEDICATIONS  (STANDING):  acetylcysteine 20%  Inhalation 4 milliLiter(s) Inhalation three times a day  albuterol/ipratropium for Nebulization 3 milliLiter(s) Nebulizer every 6 hours  allopurinol 100 milliGRAM(s) Oral daily  artificial  tears Solution 1 Drop(s) Both EYES two times a day  artificial tears (preservative free) Ophthalmic Solution 1 Drop(s) Both EYES four times a day  atorvastatin 20 milliGRAM(s) Oral at bedtime  chlorhexidine 4% Liquid 1 Application(s) Topical <User Schedule>  dextrose 40% Gel 15 Gram(s) Oral once  dextrose 5% + sodium chloride 0.9%. 1000 milliLiter(s) (50 mL/Hr) IV Continuous <Continuous>  dextrose 5%. 1000 milliLiter(s) (50 mL/Hr) IV Continuous <Continuous>  dextrose 5%. 1000 milliLiter(s) (100 mL/Hr) IV Continuous <Continuous>  dextrose 50% Injectable 25 Gram(s) IV Push once  dextrose 50% Injectable 12.5 Gram(s) IV Push once  dextrose 50% Injectable 25 Gram(s) IV Push once  glucagon  Injectable 1 milliGRAM(s) IntraMuscular once  heparin   Injectable 5000 Unit(s) SubCutaneous every 8 hours  insulin lispro (ADMELOG) corrective regimen sliding scale   SubCutaneous every 6 hours  levETIRAcetam  Solution 750 milliGRAM(s) Oral two times a day  levothyroxine 75 MICROGram(s) Oral daily  metoprolol tartrate 50 milliGRAM(s) Oral two times a day  nystatin Cream 1 Application(s) Topical two times a day  sodium chloride 3%  Inhalation 4 milliLiter(s) Inhalation every 6 hours  vancomycin  IVPB 1000 milliGRAM(s) IV Intermittent every 24 hours    MEDICATIONS  (PRN):  acetaminophen     Tablet .. 650 milliGRAM(s) Oral every 6 hours PRN Temp greater or equal to 38C (100.4F), Mild Pain (1 - 3)  melatonin 3 milliGRAM(s) Oral at bedtime PRN Insomnia  sodium chloride 0.9% lock flush 10 milliLiter(s) IV Push every 1 hour PRN Pre/post blood products, medications, blood draw, and to maintain line patency      Vital Signs Last 24 Hrs  T(C): 36.9 (06 Jan 2022 04:21), Max: 37.2 (05 Jan 2022 21:00)  T(F): 98.5 (06 Jan 2022 04:21), Max: 98.9 (05 Jan 2022 21:00)  HR: 95 (06 Jan 2022 06:05) (91 - 96)  BP: 136/78 (06 Jan 2022 04:21) (120/52 - 147/82)  BP(mean): --  RR: 20 (06 Jan 2022 06:05) (20 - 22)  SpO2: 95% (06 Jan 2022 06:05) (94% - 100%)  CAPILLARY BLOOD GLUCOSE      POCT Blood Glucose.: 138 mg/dL (06 Jan 2022 05:55)  POCT Blood Glucose.: 117 mg/dL (05 Jan 2022 23:33)  POCT Blood Glucose.: 128 mg/dL (05 Jan 2022 18:07)  POCT Blood Glucose.: 136 mg/dL (05 Jan 2022 11:58)    I&O's Summary    05 Jan 2022 07:01  -  06 Jan 2022 07:00  --------------------------------------------------------  IN: 550 mL / OUT: 760 mL / NET: -210 mL        PHYSICAL EXAM:  HEAD:  Atraumatic, Normocephalic  NECK: Supple, No   JVD  CHEST/LUNG:   no     rales,     no,    rhonchi  HEART: Regular rate and rhythm;         murmur  ABDOMEN: Soft, Nontender, ;   EXTREMITIES:   no     edema  NEUROLOGY:  alert    LABS:                        8.6    5.06  )-----------( 272      ( 06 Jan 2022 07:07 )             29.1     01-06    140  |  105  |  8   ----------------------------<  146<H>  4.2   |  22  |  0.56    Ca    9.5      06 Jan 2022 06:06  Phos  4.1     01-05  Mg     1.9     01-05    TPro  6.5  /  Alb  3.3  /  TBili  0.3  /  DBili  x   /  AST  17  /  ALT  20  /  AlkPhos  86  01-06              ABG - ( 05 Jan 2022 00:27 )  pH, Arterial: 7.39  pH, Blood: x     /  pCO2: 34    /  pO2: 75    / HCO3: 21    / Base Excess: -3.8  /  SaO2: 96.7              01-05 @ 06:53  4.1  45              Consultant(s) Notes Reviewed:      Care Discussed with Consultants/Other Providers:

## 2022-01-06 NOTE — PROVIDER CONTACT NOTE (CRITICAL VALUE NOTIFICATION) - ACTION/TREATMENT ORDERED:
continue to monitor pt and labs
pt on antibiotics.
Continue with Zosyn and Vancomycin. Will continue to monitor.
no new orders at this time continue to monitor pt
DANILO Farris notified; CBC reordered. Will repeat lab & continue to monitor.

## 2022-01-06 NOTE — PROGRESS NOTE ADULT - ASSESSMENT
ASSESSMENT:    86 year old gentlewoman, lifelong non-smoker, without history of intrinsic lung disease. 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 Although the PEG tube remains in place, the patient now tolerates a regular diet. She has a history of HTN, HLD, CAD s/p MI with preserved LVEF and moderate aortic stenosis. The patient was admitted on 12/19 with fever and abdominal pain. She was found to have perforated appendicitis with abscess formation. The patient has been treated with tube drainage (removed several days ago) and antibiotics for a polymicrobial infection (currently on vancomycin and ertapenem). CT scan on admission had debris within the left lobar and more distal airways of the left lower lobe as well as some debris within the right lower lobe airway - there was complete atelectasis of the left lower lobe and subsegmental atelectatic changes within the left upper lobe and dependent portions of the right lung. This was likely due to the patient's weak cough following her CVA. Overnight, the patient developed increased work of breathing, tachycardia, tachypnea and hypoxemia. The patient has been placed on BIPAP which she is finding very uncomfortable. She is currently without respiratory distress and without hypoxemia on FiO2 40%. She has no cough, sputum production, chest congestion or wheeze. No fevers, chills or sweats. No chest pain/pressure or palpitations. CXR "to my eye" reveals  complete opacification of the left hemithorax with leftward tracheal deviation c/w worsening mucous plugging and complete left lung atelectasis    PLAN/RECOMMENDATIONS:    no shortness of breath or hypoxemia on 100% high flow nasal canula  repeat CXR with persistent left lung collapse -> bronchoscopy this AM - will need to go down to endoscopy on 100% NRB  restart NIV for increased work of breathing, resistant hypoxemia or respiratory acidosis  continue albuterol/atrovent nebs q6h after bronchoscopy  continue hypertonic saline and mucomyst nebs to facilitate mucous clearance after bronchoscopy  continue chest vest therapy after discharge  continue aggressive manual chest PT  continue vancomycin x 6 weeks for methicillin resistant Staphylococcus epidermidis bacteremia - ID follow-up - off ertapenem  cardiac meds: lopressor/lipitor  DVT prophylaxis - SQ heparin  allopurinol/keppra/synthroid  glucose control    Will follow with you. Plan of care discussed with the patient at bedside and with Dr. Segundo.    Kennedy Marcano MD, [facility]  [phone]  Pulmonary Medicine

## 2022-01-06 NOTE — PROGRESS NOTE ADULT - ASSESSMENT
86   year old female      h/o hemorrhagic CVA, has  PEG,  HTN, MI,      HLD, gout   right Ca breast. s/p mastectomy in 2019,  s/p  sentinel node  localization.  4/4,  were  negative         *  p/w SOB and  acute respiratory distress,  was  on   bipap  in er   with  elevated wbc of 17,000 on arrival,  cxr,? pl effusion, right  lung opacit   *   s/p cva, has  PEG,  npo/,  on  synthroid, Keppra  ,  *  HTN, on meds  per  card  prior  echo,  normal ef  *  h/o ca breast. /, s/p  R  mastectomy  *  bacteremia. / staph epi, meth R  ct  c/a/p, ?  pna, perforated  appendicitis,  ?  mets  to  acetabulum   surg/ IR  and  oncology eval dr pacheco    appreciate  excellent  care of  house  staff  per  surg, no  intervention  *   sepsis  on  arrival, is  resolving  from  perforated  appendix/ and  Staph epi  bacteremia which is  persistent,  hence  cannot  dismiss  it as  a contaminant    and  also, with  echo, vegetation on  aortic  valve/ endocarditis, ?  esdras,  may  not  change   rx  plan,  will need  extended  course  of  ab   / will defer to card    was on iv vanco, . Cefepime   and flagyl   rpt ct  noted,  RLL  phlegmon,  and  80  cc of  pus  drained by  IR .  c/s  with  ecoli, e coccus  and  sophia  pe r card , pt high risk for esdras  and given that . this will not alter rx. pt  will need   extended  course  of ab     on iv  vanco and ertapenem  peg dislodged.  IR   replacements  on 1/3   picc  and iv ab  for 6 weeks, per  ID  s/p rrt   for hypoxia. cxr with complete  collapsed  of  left lung/  pulm magnus mir  await  bronchoscopy    remains  hypoxic, with  left  lung  collapse         rd< from: Xray Chest 1 View- PORTABLE-Urgent (12.19.21 @ 09:20) >  IMPRESSION:  Low lung volumes. New small left lower lung hazy opacity may represent   atelectasis versus pleural effusion. Redemonstration of right upper lung   perihilar opacity largely unchanged prior exam.  --- End of Report --  < end of copied text >

## 2022-01-06 NOTE — PRE-ANESTHESIA EVALUATION ADULT - NSANTHOSAYNRD_GEN_A_CORE
No. RAQUEL screening performed.  STOP BANG Legend: 0-2 = LOW Risk; 3-4 = INTERMEDIATE Risk; 5-8 = HIGH Risk
No. RAQUEL screening performed.  STOP BANG Legend: 0-2 = LOW Risk; 3-4 = INTERMEDIATE Risk; 5-8 = HIGH Risk

## 2022-01-07 NOTE — PROGRESS NOTE ADULT - COMMENTS
poor historian, cannot get proper ROS

## 2022-01-07 NOTE — PROGRESS NOTE ADULT - SUBJECTIVE AND OBJECTIVE BOX
FRANKI MEHTA 86y [id number]    Patient is a 86y old  Female who presents with a chief complaint of fevers/sob (07 Jan 2022 10:16)      Follow Up/CC:  ID following for abscesss    Interval History/ROS: no fever    Allergies    Toradol (Urticaria (Mild to Mod); Rash (Mild to Mod))    Intolerances        ANTIMICROBIALS:      MEDICATIONS  (STANDING):  acetylcysteine 20%  Inhalation 4 milliLiter(s) Inhalation three times a day  albuterol/ipratropium for Nebulization 3 milliLiter(s) Nebulizer every 6 hours  allopurinol 100 milliGRAM(s) Oral daily  artificial  tears Solution 1 Drop(s) Both EYES two times a day  artificial tears (preservative free) Ophthalmic Solution 1 Drop(s) Both EYES four times a day  atorvastatin 20 milliGRAM(s) Oral at bedtime  chlorhexidine 4% Liquid 1 Application(s) Topical <User Schedule>  dextrose 40% Gel 15 Gram(s) Oral once  dextrose 5% + sodium chloride 0.9%. 1000 milliLiter(s) (50 mL/Hr) IV Continuous <Continuous>  dextrose 5%. 1000 milliLiter(s) (50 mL/Hr) IV Continuous <Continuous>  dextrose 5%. 1000 milliLiter(s) (100 mL/Hr) IV Continuous <Continuous>  dextrose 50% Injectable 25 Gram(s) IV Push once  dextrose 50% Injectable 12.5 Gram(s) IV Push once  dextrose 50% Injectable 25 Gram(s) IV Push once  glucagon  Injectable 1 milliGRAM(s) IntraMuscular once  heparin   Injectable 5000 Unit(s) SubCutaneous every 8 hours  insulin lispro (ADMELOG) corrective regimen sliding scale   SubCutaneous every 6 hours  levETIRAcetam  Solution 750 milliGRAM(s) Oral two times a day  levothyroxine 75 MICROGram(s) Oral daily  metoprolol tartrate 50 milliGRAM(s) Oral two times a day  nystatin Cream 1 Application(s) Topical two times a day  sodium chloride 3%  Inhalation 4 milliLiter(s) Inhalation every 6 hours    MEDICATIONS  (PRN):  acetaminophen     Tablet .. 650 milliGRAM(s) Oral every 6 hours PRN Temp greater or equal to 38C (100.4F), Mild Pain (1 - 3)  melatonin 3 milliGRAM(s) Oral at bedtime PRN Insomnia  sodium chloride 0.9% lock flush 10 milliLiter(s) IV Push every 1 hour PRN Pre/post blood products, medications, blood draw, and to maintain line patency        Vital Signs Last 24 Hrs  T(C): 37.1 (07 Jan 2022 09:41), Max: 37.1 (06 Jan 2022 16:39)  T(F): 98.7 (07 Jan 2022 09:41), Max: 98.8 (06 Jan 2022 16:39)  HR: 84 (07 Jan 2022 09:41) (71 - 104)  BP: 119/61 (07 Jan 2022 09:41) (102/61 - 141/91)  BP(mean): --  RR: 18 (07 Jan 2022 09:41) (17 - 24)  SpO2: 96% (07 Jan 2022 09:41) (94% - 100%)    CBC Full  -  ( 07 Jan 2022 08:04 )  WBC Count : 5.34 K/uL  RBC Count : 2.75 M/uL  Hemoglobin : 8.8 g/dL  Hematocrit : 29.6 %  Platelet Count - Automated : 278 K/uL  Mean Cell Volume : 107.6 fl  Mean Cell Hemoglobin : 32.0 pg  Mean Cell Hemoglobin Concentration : 29.7 gm/dL  Auto Neutrophil # : x  Auto Lymphocyte # : x  Auto Monocyte # : x  Auto Eosinophil # : x  Auto Basophil # : x  Auto Neutrophil % : x  Auto Lymphocyte % : x  Auto Monocyte % : x  Auto Eosinophil % : x  Auto Basophil % : x    01-07    142  |  105  |  8   ----------------------------<  140<H>  4.3   |  24  |  0.55    Ca    9.4      07 Jan 2022 08:04    TPro  6.5  /  Alb  3.3  /  TBili  0.3  /  DBili  x   /  AST  17  /  ALT  20  /  AlkPhos  86  01-06    LIVER FUNCTIONS - ( 06 Jan 2022 06:06 )  Alb: 3.3 g/dL / Pro: 6.5 g/dL / ALK PHOS: 86 U/L / ALT: 20 U/L / AST: 17 U/L / GGT: x               MICROBIOLOGY:  BAL Bronchoalveolar Lavage  01-06-22 --  --    No polymorphonuclear cells seen per low power field  No squamous epithelial cells per low power field  No organisms seen per oil power field      .Blood Blood-Venous  12-24-21   No Growth Final  --  --      .Blood Blood-Peripheral  12-24-21   No Growth Final  --  --      .Abscess abdominal abscess  12-23-21   Culture yields >4 types of aerobic and/or anaerobic bacteria  Call client services within 7 days if further workup is clinically  indicated. Culture includes  Few Escherichia coli  Few Morganella morganii  Moderate Enterococcus avium  Numerous Bacteroides thetaiotamcron group "Susceptibilities not performed"  Numerous Clostridium ramosum "Susceptibilities not performed"  --  Escherichia coli  Morganella morganii  Enterococcus avium      .Blood Blood-Peripheral  12-21-21   Growth in aerobic bottle: Staphylococcus epidermidis "Susceptibilities  not performed"  --  Blood Culture PCR      Clean Catch Clean Catch (Midstream)  12-19-21   <10,000 CFU/mL Normal Urogenital Regina  --  --      .Blood Blood  12-19-21   Growth in aerobic and anaerobic bottles: Staphylococcus epidermidis Coag  Negative Staphylococcus  Single set isolate, possible contaminant. Contact  Microbiology if susceptibility testing clinically  indicated.  ***Blood Panel PCR results on this specimen are available  approximately 3 hours after the Gram stain result.***  Gram stain, PCR, and/or culture results may not always  correspond due to difference in methodologies.  ************************************************************  This PCR assay was performed by multiplex PCR. This  Assay tests for 66 bacterial and resistance gene targets.  Please refer to the [facility] test directory  at [email or web address] for details.  --  Blood Culture PCR              v            RADIOLOGY

## 2022-01-07 NOTE — PROGRESS NOTE ADULT - SUBJECTIVE AND OBJECTIVE BOX
CARDIOLOGY     PROGRESS  NOTE   ________________________________________________    CHIEF COMPLAINT:Patient is a 86y old  Female who presents with a chief complaint of fevers/sob (07 Jan 2022 08:34)  no complain.  	  REVIEW OF SYSTEMS:  CONSTITUTIONAL: No fever, weight loss, or fatigue  EYES: No eye pain, visual disturbances, or discharge  ENT:  No difficulty hearing, tinnitus, vertigo; No sinus or throat pain  NECK: No pain or stiffness  RESPIRATORY: No cough, wheezing, chills or hemoptysis; No Shortness of Breath  CARDIOVASCULAR: No chest pain, palpitations, passing out, dizziness, or leg swelling  GASTROINTESTINAL: No abdominal or epigastric pain. No nausea, vomiting, or hematemesis; No diarrhea or constipation. No melena or hematochezia.  GENITOURINARY: No dysuria, frequency, hematuria, or incontinence  NEUROLOGICAL: No headaches, memory loss, loss of strength, numbness, or tremors  SKIN: No itching, burning, rashes, or lesions   LYMPH Nodes: No enlarged glands  ENDOCRINE: No heat or cold intolerance; No hair loss  MUSCULOSKELETAL: No joint pain or swelling; No muscle, back, or extremity pain  PSYCHIATRIC: No depression, anxiety, mood swings, or difficulty sleeping  HEME/LYMPH: No easy bruising, or bleeding gums  ALLERGY AND IMMUNOLOGIC: No hives or eczema	    [ ] All others negative	  [x ] Unable to obtain    PHYSICAL EXAM:  T(C): 37.1 (01-07-22 @ 09:41), Max: 37.1 (01-06-22 @ 16:39)  HR: 84 (01-07-22 @ 09:41) (71 - 104)  BP: 119/61 (01-07-22 @ 09:41) (102/61 - 141/91)  RR: 18 (01-07-22 @ 09:41) (17 - 24)  SpO2: 96% (01-07-22 @ 09:41) (94% - 100%)  Wt(kg): --  I&O's Summary    06 Jan 2022 07:01  -  07 Jan 2022 07:00  --------------------------------------------------------  IN: 0 mL / OUT: 1000 mL / NET: -1000 mL        Appearance: Normal	  HEENT:   Normal oral mucosa, PERRL, EOMI	  Lymphatic: No lymphadenopathy  Cardiovascular: Normal S1 S2, No JVD, + murmurs, No edema  Respiratory: Lungs clear to auscultation	  Psychiatry: A & O x 3, Mood & affect appropriate  Gastrointestinal:  Soft, Non-tender, + BS, peg  Skin: No rashes, No ecchymoses, No cyanosis	  Neurologic: Non-focal  Extremities: Normal range of motion, No clubbing, cyanosis or edema  Vascular: Peripheral pulses palpable 2+ bilaterally    MEDICATIONS  (STANDING):  acetylcysteine 20%  Inhalation 4 milliLiter(s) Inhalation three times a day  albuterol/ipratropium for Nebulization 3 milliLiter(s) Nebulizer every 6 hours  allopurinol 100 milliGRAM(s) Oral daily  artificial  tears Solution 1 Drop(s) Both EYES two times a day  artificial tears (preservative free) Ophthalmic Solution 1 Drop(s) Both EYES four times a day  atorvastatin 20 milliGRAM(s) Oral at bedtime  chlorhexidine 4% Liquid 1 Application(s) Topical <User Schedule>  dextrose 40% Gel 15 Gram(s) Oral once  dextrose 5% + sodium chloride 0.9%. 1000 milliLiter(s) (50 mL/Hr) IV Continuous <Continuous>  dextrose 5%. 1000 milliLiter(s) (50 mL/Hr) IV Continuous <Continuous>  dextrose 5%. 1000 milliLiter(s) (100 mL/Hr) IV Continuous <Continuous>  dextrose 50% Injectable 25 Gram(s) IV Push once  dextrose 50% Injectable 12.5 Gram(s) IV Push once  dextrose 50% Injectable 25 Gram(s) IV Push once  glucagon  Injectable 1 milliGRAM(s) IntraMuscular once  heparin   Injectable 5000 Unit(s) SubCutaneous every 8 hours  insulin lispro (ADMELOG) corrective regimen sliding scale   SubCutaneous every 6 hours  levETIRAcetam  Solution 750 milliGRAM(s) Oral two times a day  levothyroxine 75 MICROGram(s) Oral daily  metoprolol tartrate 50 milliGRAM(s) Oral two times a day  nystatin Cream 1 Application(s) Topical two times a day  sodium chloride 3%  Inhalation 4 milliLiter(s) Inhalation every 6 hours      TELEMETRY: 	    ECG:  	  RADIOLOGY:  OTHER: 	  	  LABS:	 	    CARDIAC MARKERS:                                8.8    5.34  )-----------( 278      ( 07 Jan 2022 08:04 )             29.6     01-07    142  |  105  |  8   ----------------------------<  140<H>  4.3   |  24  |  0.55    Ca    9.4      07 Jan 2022 08:04    TPro  6.5  /  Alb  3.3  /  TBili  0.3  /  DBili  x   /  AST  17  /  ALT  20  /  AlkPhos  86  01-06    proBNP: Serum Pro-Brain Natriuretic Peptide: 375 pg/mL (12-19 @ 08:45)    Lipid Profile:   HgA1c:   TSH: Thyroid Stimulating Hormone, Serum: 2.39 uIU/mL (01-07 @ 09:13)    < from: Xray Chest 1 View- PORTABLE-Urgent (Xray Chest 1 View- PORTABLE-Urgent .) (01.06.22 @ 07:24) >  Left humeral hardware. Left upper extremity PICC terminates in SVC.  The heart is not accurately assessed in this projection. Mediastinum is   shifted to the left..  Whiteout of the left lung. The right lung is free of consolidation.  No pneumothorax.    IMPRESSION:  Left lung collapse, unchanged.    no shortness of breath or hypoxemia on 100% high flow nasal canula  repeat CXR with persistent left lung collapse -> bronchoscopy this AM - will need to go down to endoscopy on 100% NRB  restart NIV for increased work of breathing, resistant hypoxemia or respiratory acidosis  continue albuterol/atrovent nebs q6h after bronchoscopy  continue hypertonic saline and mucomyst nebs to facilitate mucous clearance after bronchoscopy  continue chest vest therapy after discharge  continue aggressive manual chest PT  continue vancomycin x 6 weeks for methicillin resistant Staphylococcus epidermidis bacteremia - ID follow-up - off ertapenem  cardiac meds: lopressor/lipitor  DVT prophylaxis - SQ heparin  allopurinol/keppra/synthroid        Assessment and plan  ---------------------------  85 yo F from [facility], CVA with residual left weakness (~3 years ago), PEG for dysphagia, hypothyroid, COPD (on no home Oxygen), HTN, gout, p/w fever, Pt has respiratory distress, wheezing, concerning for respiratory infection, otherwise no other symptoms is reported on the chart. EMS gave solumedrol 125 through peripheral, small iv line.  pt is well known to me with hx of htn, ashd, s/p MI, cva with increasing sob on bipap in er.  can not get any hx from the pt.  continue bp meds  dvt prophylaxis  will consider to possible repeat echo  duoneb  dvt prophylaxis  ct scan/ surgery/ ID noted  continue amlodipine and Metoprolol for now  tachycardia sec to underlying condition, continue to observe  oob to chair as tolerated  replete K  abx as per ID on vanco fu level  repeat blood cultures, negative  will repeat  TTE in 6 weeks  on iv beta blocker for bp/hr control  events noted  ?mucus pluge/ bronch/ pulmonary appreciated, l lung collapse  observe closely  doing better, pulmonary follow up

## 2022-01-07 NOTE — PROGRESS NOTE ADULT - NSPROGADDITIONALINFOA_GEN_ALL_CORE
Please call the ID service [phone] with questions or concerns over the weekend.
D/w resident    ID coverage available over New Year 3-day weekend (Dec 31-Jan 2) if needed. Call #[phone] for questions/concerns.

## 2022-01-07 NOTE — PROGRESS NOTE ADULT - ASSESSMENT
ASSESSMENT:    86 year old gentlewoman, lifelong non-smoker, without history of intrinsic lung disease. 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 She has a history of HTN, HLD, CAD s/p MI with preserved LVEF and moderate aortic stenosis. The patient was admitted on 12/19 with fever and abdominal pain. She was found to have perforated appendicitis with abscess formation. The patient has been treated with tube drainage (removed several days ago) and antibiotics for a polymicrobial infection (currently on vancomycin and ertapenem). CT scan on admission had debris within the left lobar and more distal airways of the left lower lobe as well as some debris within the right lower lobe airway - there was complete atelectasis of the left lower lobe and subsegmental atelectatic changes within the left upper lobe and dependent portions of the right lung. This was likely due to the patient's weak cough following her CVA. Overnight, the patient developed increased work of breathing, tachycardia, tachypnea and hypoxemia. The patient has been placed on BIPAP which she is finding very uncomfortable. She is currently without respiratory distress and without hypoxemia on FiO2 40%. She has no cough, sputum production, chest congestion or wheeze. No fevers, chills or sweats. No chest pain/pressure or palpitations. CXR "to my eye" reveals  complete opacification of the left hemithorax with leftward tracheal deviation c/w worsening mucous plugging and complete left lung atelectasis    PLAN/RECOMMENDATIONS:    no shortness of breath or hypoxemia on 100% high flow nasal canula  s/p bronchoscopy with removal of copious amounts of purulent mucous from throughout the airways - there was severe tracheobronchomalacia  NIV for sleep - high flow nasal canula during the day -> taper as tolerated  following CXR  continue albuterol/atrovent nebs q6h  continue hypertonic saline and mucomyst nebs to facilitate mucous clearance after bronchoscopy  continue chest vest therapy   continue aggressive manual chest PT  continue vancomycin x 6 weeks for methicillin resistant Staphylococcus epidermidis bacteremia - ertapenem - ID follow-up noted   cardiac meds: lopressor/lipitor  DVT prophylaxis - SQ heparin  allopurinol/keppra/synthroid  glucose control    Will follow with you. Plan of care discussed with the patient at bedside, the dedicated floor NP and with Dr. Segundo.    Kennedy Marcano MD, [facility]  [phone]  Pulmonary Medicine

## 2022-01-07 NOTE — PROGRESS NOTE ADULT - ASSESSMENT
86   year old female      h/o hemorrhagic CVA, has  PEG,  HTN, MI,      HLD, gout   right Ca breast. s/p mastectomy in 2019,  s/p  sentinel node  localization.  4/4,  were  negative         *  p/w SOB and  acute respiratory distress,  was  on   bipap  in er   with  elevated wbc of 17,000 on arrival,  cxr,? pl effusion, right  lung opacit   *   s/p cva, has  PEG,  npo/,  on  synthroid, Keppra  ,  *  HTN, on meds  per  card  prior  echo,  normal ef  *  h/o ca breast. /, s/p  R  mastectomy  *  bacteremia. / staph epi, meth R  ct  c/a/p, ?  pna, perforated  appendicitis,  ?  mets  to  acetabulum   surg/ IR  and  oncology eval dr pacheco    appreciate  excellent  care of  house  staff  per  surg, no  intervention  *   sepsis  on  arrival, is  resolving  from  perforated  appendix/ and  Staph epi  bacteremia which is  persistent,  hence  cannot  dismiss  it as  a contaminant    and  also, with  echo, vegetation on  aortic  valve/ endocarditis, ?  esdras,  may  not  change   rx  plan,  will need  extended  course  of  ab   / will defer to card    was on iv vanco, . Cefepime   and flagyl   rpt ct  noted,  RLL  phlegmon,  and  80  cc of  pus  drained by  IR .  c/s  with  ecoli, e coccus  and  sophia  pe r card , pt high risk for esdras  and given that . this will not alter rx. pt  will need   extended  course  of ab     on iv  vanco and ertapenem  peg dislodged.  IR   replacements  on 1/3   picc  and iv ab  for 6 weeks, per  ID  s/p rrt   for hypoxia. cxr with complete  collapsed  of  left lung/  pulm d r clarke   s/ p  bronchoscopy  /  follow  cxr. rx  plan per  pulm /  pt  with  tracheomalacia  and  collapsed  airways,  makes  this a  difficult  situation         rd< from: Xray Chest 1 View- PORTABLE-Urgent (12.19.21 @ 09:20) >  IMPRESSION:  Low lung volumes. New small left lower lung hazy opacity may represent   atelectasis versus pleural effusion. Redemonstration of right upper lung   perihilar opacity largely unchanged prior exam.  --- End of Report --  < end of copied text >             86   year old female      h/o hemorrhagic CVA, has  PEG,  HTN, MI,      HLD, gout   right Ca breast. s/p mastectomy in 2019,  s/p  sentinel node  localization.  4/4,  were  negative         *  p/w SOB and  acute respiratory distress,  was  on   bipap  in er   with  elevated wbc of 17,000 on arrival,  cxr,? pl effusion, right  lung opacit   *   s/p cva, has  PEG,  npo/,  on  synthroid, Keppra  ,  *  HTN, on meds  per  card  prior  echo,  normal ef  *  h/o ca breast. /, s/p  R  mastectomy  * obesity, bmi is  41  *  bacteremia. / staph epi, meth R  ct  c/a/p, ?  pna, perforated  appendicitis,  ?  mets  to  acetabulum   surg/ IR  and  oncology eval dr pacheco  ,   per  surg, no  intervention  *   sepsis  on  arrival, is  resolving  from  perforated  appendix/ and  Staph epi  bacteremia which is  persistent,  hence  cannot  dismiss  it as  a contaminant    and  also, with  echo, vegetation on  aortic  valve/ endocarditis,    ?  esdras,  may  not  change   rx  plan,  will need  extended  course  of  ab  , per  card   rpt ct   RLL  phlegmon,  and  80  cc of  pus  drained by  IR . c/s  with  ecoli, e coccus  and  morganell  per card , pt high risk for esdras  and given that . this will not alter rx. pt  will need   extended  course  of ab     on iv  vanco and ertapenem  peg dislodged.  IR   replacements  on 1/3   picc  and iv ab  for 6 weeks, per  ID  s/p rrt   for hypoxia. cxr with complete  collapsed  of  left lung/  pulm magnus r clarke   s/ p  bronchoscopy  /  follow  cxr. rx  plan per  pulm /  pt  with  tracheomalacia  and  collapsed  airways,  makes  this a  difficult  situation, as there is no good rx for this     goc/ pt is a full code. per  family         rd< from: Xray Chest 1 View- PORTABLE-Urgent (12.19.21 @ 09:20) >  IMPRESSION:  Low lung volumes. New small left lower lung hazy opacity may represent   atelectasis versus pleural effusion. Redemonstration of right upper lung   perihilar opacity largely unchanged prior exam.  --- End of Report --  < end of copied text >

## 2022-01-07 NOTE — PROGRESS NOTE ADULT - RS GEN PE MLT RESP DETAILS PC
clear to auscultation bilaterally/no wheezes
diminished breath sounds, L
clear to auscultation bilaterally/no wheezes
no wheezes/diminished breath sounds, R
clear to auscultation bilaterally/no wheezes

## 2022-01-07 NOTE — PROGRESS NOTE ADULT - SUBJECTIVE AND OBJECTIVE BOX
afebrile    REVIEW OF SYSTEMS:  GEN: no fever,    no chills  RESP: no SOB,   no cough  CVS: no chest pain,   no palpitations  GI: no abdominal pain,   no nausea,   no vomiting,   no constipation,   no diarrhea  : no dysuria,   no frequency  NEURO: no headache,   no dizziness  PSYCH: no depression,   not anxious  Derm : no rash    MEDICATIONS  (STANDING):  acetylcysteine 20%  Inhalation 4 milliLiter(s) Inhalation three times a day  albuterol/ipratropium for Nebulization 3 milliLiter(s) Nebulizer every 6 hours  allopurinol 100 milliGRAM(s) Oral daily  artificial  tears Solution 1 Drop(s) Both EYES two times a day  artificial tears (preservative free) Ophthalmic Solution 1 Drop(s) Both EYES four times a day  atorvastatin 20 milliGRAM(s) Oral at bedtime  chlorhexidine 4% Liquid 1 Application(s) Topical <User Schedule>  dextrose 40% Gel 15 Gram(s) Oral once  dextrose 5% + sodium chloride 0.9%. 1000 milliLiter(s) (50 mL/Hr) IV Continuous <Continuous>  dextrose 5%. 1000 milliLiter(s) (50 mL/Hr) IV Continuous <Continuous>  dextrose 5%. 1000 milliLiter(s) (100 mL/Hr) IV Continuous <Continuous>  dextrose 50% Injectable 25 Gram(s) IV Push once  dextrose 50% Injectable 12.5 Gram(s) IV Push once  dextrose 50% Injectable 25 Gram(s) IV Push once  glucagon  Injectable 1 milliGRAM(s) IntraMuscular once  heparin   Injectable 5000 Unit(s) SubCutaneous every 8 hours  insulin lispro (ADMELOG) corrective regimen sliding scale   SubCutaneous every 6 hours  levETIRAcetam  Solution 750 milliGRAM(s) Oral two times a day  levothyroxine 75 MICROGram(s) Oral daily  metoprolol tartrate 50 milliGRAM(s) Oral two times a day  nystatin Cream 1 Application(s) Topical two times a day  sodium chloride 3%  Inhalation 4 milliLiter(s) Inhalation every 6 hours    MEDICATIONS  (PRN):  acetaminophen     Tablet .. 650 milliGRAM(s) Oral every 6 hours PRN Temp greater or equal to 38C (100.4F), Mild Pain (1 - 3)  melatonin 3 milliGRAM(s) Oral at bedtime PRN Insomnia  sodium chloride 0.9% lock flush 10 milliLiter(s) IV Push every 1 hour PRN Pre/post blood products, medications, blood draw, and to maintain line patency      Vital Signs Last 24 Hrs  T(C): 36.7 (07 Jan 2022 04:26), Max: 37.1 (06 Jan 2022 16:39)  T(F): 98 (07 Jan 2022 04:26), Max: 98.8 (06 Jan 2022 16:39)  HR: 83 (07 Jan 2022 05:54) (71 - 104)  BP: 133/69 (07 Jan 2022 04:26) (102/61 - 141/91)  BP(mean): --  RR: 20 (07 Jan 2022 04:26) (17 - 24)  SpO2: 98% (07 Jan 2022 05:54) (94% - 100%)  CAPILLARY BLOOD GLUCOSE      POCT Blood Glucose.: 144 mg/dL (07 Jan 2022 06:55)  POCT Blood Glucose.: 106 mg/dL (07 Jan 2022 00:26)  POCT Blood Glucose.: 120 mg/dL (06 Jan 2022 17:15)  POCT Blood Glucose.: 120 mg/dL (06 Jan 2022 14:36)    I&O's Summary    06 Jan 2022 07:01  -  07 Jan 2022 07:00  --------------------------------------------------------  IN: 0 mL / OUT: 1000 mL / NET: -1000 mL        PHYSICAL EXAM:  HEAD:  Atraumatic, Normocephalic  NECK: Supple, No   JVD  CHEST/LUNG:   no     rales,     no,    rhonchi  HEART: Regular rate and rhythm;         murmur  ABDOMEN: Soft, Nontender, ;   EXTREMITIES:    no    edema  NEUROLOGY:  alert    LABS:                        8.6    5.06  )-----------( 272      ( 06 Jan 2022 07:07 )             29.1     01-06    140  |  105  |  8   ----------------------------<  146<H>  4.2   |  22  |  0.56    Ca    9.5      06 Jan 2022 06:06    TPro  6.5  /  Alb  3.3  /  TBili  0.3  /  DBili  x   /  AST  17  /  ALT  20  /  AlkPhos  86  01-06                          Culture - Bronchial (collected 01-06-22 @ 18:48)  Source: Bronch Wash Bronchoalveolar Lavage  Gram Stain (01-06-22 @ 23:22):    No polymorphonuclear cells seen per low power field    No squamous epithelial cells per low power field    No organisms seen per oil power field    Culture - Acid Fast - Bronchial w/Smear (collected 01-06-22 @ 18:48)  Source: BAL Bronchoalveolar Lavage        Consultant(s) Notes Reviewed:      Care Discussed with Consultants/Other Providers:

## 2022-01-07 NOTE — PROGRESS NOTE ADULT - GASTROINTESTINAL DETAILS
soft/no rebound tenderness/no guarding
soft/nontender/no guarding/no rigidity
soft/nontender/no distention/no rebound tenderness/no guarding
soft/no guarding
soft/nontender/no distention/no rebound tenderness/no guarding

## 2022-01-07 NOTE — PROGRESS NOTE ADULT - MS EXT PE MLT D E PC
no cyanosis/pedal edema

## 2022-01-08 NOTE — PROGRESS NOTE ADULT - SUBJECTIVE AND OBJECTIVE BOX
CARDIOLOGY     PROGRESS  NOTE   ________________________________________________    CHIEF COMPLAINT:Patient is a 86y old  Female who presents with a chief complaint of fevers/sob (08 Jan 2022 06:21)    	  REVIEW OF SYSTEMS:  CONSTITUTIONAL: No fever, weight loss, or fatigue  EYES: No eye pain, visual disturbances, or discharge  ENT:  No difficulty hearing, tinnitus, vertigo; No sinus or throat pain  NECK: No pain or stiffness  RESPIRATORY: No cough, wheezing, chills or hemoptysis; No Shortness of Breath  CARDIOVASCULAR: No chest pain, palpitations, passing out, dizziness, or leg swelling  GASTROINTESTINAL: No abdominal or epigastric pain. No nausea, vomiting, or hematemesis; No diarrhea or constipation. No melena or hematochezia.  GENITOURINARY: No dysuria, frequency, hematuria, or incontinence  NEUROLOGICAL: No headaches, memory loss, loss of strength, numbness, or tremors  SKIN: No itching, burning, rashes, or lesions   LYMPH Nodes: No enlarged glands  ENDOCRINE: No heat or cold intolerance; No hair loss  MUSCULOSKELETAL: No joint pain or swelling; No muscle, back, or extremity pain  PSYCHIATRIC: No depression, anxiety, mood swings, or difficulty sleeping  HEME/LYMPH: No easy bruising, or bleeding gums  ALLERGY AND IMMUNOLOGIC: No hives or eczema	    [ ] All others negative	  [ ] Unable to obtain    PHYSICAL EXAM:  T(C): 36.7 (01-08-22 @ 12:20), Max: 36.9 (01-07-22 @ 16:59)  HR: 104 (01-08-22 @ 12:20) (66 - 104)  BP: 104/59 (01-08-22 @ 12:20) (91/52 - 129/79)  RR: 18 (01-08-22 @ 12:20) (18 - 20)  SpO2: 94% (01-08-22 @ 12:20) (94% - 100%)  Wt(kg): --  I&O's Summary    07 Jan 2022 07:01  -  08 Jan 2022 07:00  --------------------------------------------------------  IN: 1380 mL / OUT: 500 mL / NET: 880 mL        Appearance: Normal	  HEENT:   Normal oral mucosa, PERRL, EOMI	  Lymphatic: No lymphadenopathy  Cardiovascular: Normal S1 S2, No JVD, +murmurs, No edema  Respiratory: Lungs clear to auscultation	  Gastrointestinal:  Soft, Non-tender, + BS, peg  Skin: No rashes, No ecchymoses, No cyanosis	  Neurologic: Non-focal  Extremities: Normal range of motion, No clubbing, cyanosis or edema  Vascular: Peripheral pulses palpable 2+ bilaterally    MEDICATIONS  (STANDING):  acetylcysteine 20%  Inhalation 4 milliLiter(s) Inhalation three times a day  albuterol/ipratropium for Nebulization 3 milliLiter(s) Nebulizer every 6 hours  allopurinol 100 milliGRAM(s) Oral daily  artificial  tears Solution 1 Drop(s) Both EYES two times a day  artificial tears (preservative free) Ophthalmic Solution 1 Drop(s) Both EYES four times a day  atorvastatin 20 milliGRAM(s) Oral at bedtime  chlorhexidine 4% Liquid 1 Application(s) Topical <User Schedule>  dextrose 40% Gel 15 Gram(s) Oral once  dextrose 5% + sodium chloride 0.9%. 1000 milliLiter(s) (50 mL/Hr) IV Continuous <Continuous>  dextrose 5%. 1000 milliLiter(s) (50 mL/Hr) IV Continuous <Continuous>  dextrose 5%. 1000 milliLiter(s) (100 mL/Hr) IV Continuous <Continuous>  dextrose 50% Injectable 25 Gram(s) IV Push once  dextrose 50% Injectable 12.5 Gram(s) IV Push once  dextrose 50% Injectable 25 Gram(s) IV Push once  ertapenem  IVPB 1000 milliGRAM(s) IV Intermittent every 24 hours  glucagon  Injectable 1 milliGRAM(s) IntraMuscular once  heparin   Injectable 5000 Unit(s) SubCutaneous every 8 hours  insulin lispro (ADMELOG) corrective regimen sliding scale   SubCutaneous every 6 hours  levETIRAcetam  Solution 750 milliGRAM(s) Oral two times a day  levothyroxine 75 MICROGram(s) Oral daily  metoprolol tartrate 50 milliGRAM(s) Oral two times a day  nystatin Cream 1 Application(s) Topical two times a day  sodium chloride 3%  Inhalation 4 milliLiter(s) Inhalation every 6 hours  vancomycin  IVPB 1000 milliGRAM(s) IV Intermittent every 24 hours      TELEMETRY: 	    ECG:  	  RADIOLOGY:  OTHER: 	  	  LABS:	 	    CARDIAC MARKERS:                                8.8    5.34  )-----------( 278      ( 07 Jan 2022 08:04 )             29.6     01-07    142  |  105  |  8   ----------------------------<  140<H>  4.3   |  24  |  0.55    Ca    9.4      07 Jan 2022 08:04      proBNP: Serum Pro-Brain Natriuretic Peptide: 375 pg/mL (12-19 @ 08:45)    Lipid Profile:   HgA1c:   TSH: Thyroid Stimulating Hormone, Serum: 2.39 uIU/mL (01-07 @ 09:13)    < from: Xray Chest 1 View- PORTABLE-Urgent (Xray Chest 1 View- PORTABLE-Urgent .) (01.08.22 @ 09:46) >  Impression: Left PICC tip at the SVC. Cardiomegaly. Improved aeration of   the left lung, although with persistent small patchy opacities.          Assessment and plan  ---------------------------  85 yo F from [facility], CVA with residual left weakness (~3 years ago), PEG for dysphagia, hypothyroid, COPD (on no home Oxygen), HTN, gout, p/w fever, Pt has respiratory distress, wheezing, concerning for respiratory infection, otherwise no other symptoms is reported on the chart. EMS gave solumedrol 125 through peripheral, small iv line.  pt is well known to me with hx of htn, ashd, s/p MI, cva with increasing sob on bipap in er.  can not get any hx from the pt.  continue bp meds  dvt prophylaxis  will consider to possible repeat echo  duoneb  dvt prophylaxis  ct scan/ surgery/ ID noted  continue amlodipine and Metoprolol for now  tachycardia sec to underlying condition, continue to observe  oob to chair as tolerated  replete K  abx as per ID on vanco fu level  repeat blood cultures, negative  will repeat  TTE in 6 weeks  on iv beta blocker for bp/hr control  events noted  ?mucus pluge/ bronch/ pulmonary appreciated, l lung collapse  observe closely  doing better, pulmonary follow up, chest x ray noted

## 2022-01-08 NOTE — PROGRESS NOTE ADULT - SUBJECTIVE AND OBJECTIVE BOX
[facility] - PROGRESS NOTE    CHIEF COMPLAINT: acute hypoxic respiratory failure; mucous plugging; left lung atelectasis; weak cough; dysphagia; h/o CVA; perforated appendicitis with abscess formation    INTERVAL HISTORY: s/p bronchoscopy with removal of copious amounts of purulent secretions from throughout the bronchial tree - there was severe tracheobronchomalacia preventing mucous removal; CXR this AM with continued increased aeration in the left lung; no shortness of breath or hypoxemia on a 40% high flow nasal canula; occasional weak cough productive of scant sputum; no chest congestion or wheeze; no fevers, chills or sweats; no chest pain/pressure or palpitations; bedbound;    REVIEW OF SYSTEMS:  Constitutional: As per interval history  HEENT: Within normal limits  CV: As per interval history  Resp: As per interval history  GI: dysphagia -> PEG   : Within normal limits  Musculoskeletal: Within normal limits  Skin: Within normal limits  Neurological: CVA -> left sided weakness  Psychiatric: Within normal limits  Endocrine: Within normal limits  Hematologic/Lymphatic: Within normal limits  Allergic/Immunologic: Within normal limits    MEDICATIONS:     Pulmonary "  acetylcysteine 20%  Inhalation 4 milliLiter(s) Inhalation three times a day  albuterol/ipratropium for Nebulization 3 milliLiter(s) Nebulizer every 6 hours  sodium chloride 3%  Inhalation 4 milliLiter(s) Inhalation every 6 hours    Anti-microbials:  ertapenem  IVPB 1000 milliGRAM(s) IV Intermittent every 24 hours  vancomycin  IVPB 1000 milliGRAM(s) IV Intermittent every 24 hours    Cardiovascular:  metoprolol tartrate 50 milliGRAM(s) Oral two times a day    Other:  allopurinol 100 milliGRAM(s) Oral daily  artificial  tears Solution 1 Drop(s) Both EYES two times a day  artificial tears (preservative free) Ophthalmic Solution 1 Drop(s) Both EYES four times a day  atorvastatin 20 milliGRAM(s) Oral at bedtime  chlorhexidine 4% Liquid 1 Application(s) Topical <User Schedule>  dextrose 40% Gel 15 Gram(s) Oral once  dextrose 5% + sodium chloride 0.9%. 1000 milliLiter(s) IV Continuous <Continuous>  dextrose 5%. 1000 milliLiter(s) IV Continuous <Continuous>  dextrose 5%. 1000 milliLiter(s) IV Continuous <Continuous>  dextrose 50% Injectable 25 Gram(s) IV Push once  dextrose 50% Injectable 12.5 Gram(s) IV Push once  dextrose 50% Injectable 25 Gram(s) IV Push once  glucagon  Injectable 1 milliGRAM(s) IntraMuscular once  heparin   Injectable 5000 Unit(s) SubCutaneous every 8 hours  insulin lispro (ADMELOG) corrective regimen sliding scale   SubCutaneous every 6 hours  levETIRAcetam  Solution 750 milliGRAM(s) Oral two times a day  levothyroxine 75 MICROGram(s) Oral daily  nystatin Cream 1 Application(s) Topical two times a day    MEDICATIONS  (PRN):  acetaminophen     Tablet .. 650 milliGRAM(s) Oral every 6 hours PRN Temp greater or equal to 38C (100.4F), Mild Pain (1 - 3)  melatonin 3 milliGRAM(s) Oral at bedtime PRN Insomnia  sodium chloride 0.9% lock flush 10 milliLiter(s) IV Push every 1 hour PRN Pre/post blood products, medications, blood draw, and to maintain line patency        OBJECTIVE:    I&O's Detail    07 Jan 2022 07:01  -  08 Jan 2022 07:00  --------------------------------------------------------  IN:    dextrose 5% + sodium chloride 0.9%: 200 mL    Glucerna: 1180 mL  Total IN: 1380 mL    OUT:    Oral Fluid: 0 mL    Voided (mL): 500 mL  Total OUT: 500 mL    Total NET: 880 mL    POCT Blood Glucose.: 142 mg/dL (08 Jan 2022 11:42)  POCT Blood Glucose.: 180 mg/dL (08 Jan 2022 06:22)  POCT Blood Glucose.: 166 mg/dL (07 Jan 2022 23:35)  POCT Blood Glucose.: 143 mg/dL (07 Jan 2022 17:30)      PHYSICAL EXAM:       ICU Vital Signs Last 24 Hrs  T(C): 36.7 (08 Jan 2022 12:20), Max: 36.9 (07 Jan 2022 16:59)  T(F): 98 (08 Jan 2022 12:20), Max: 98.5 (07 Jan 2022 16:59)  HR: 104 (08 Jan 2022 12:20) (66 - 104)  BP: 104/59 (08 Jan 2022 12:20) (91/52 - 129/79)  BP(mean): --  ABP: --  ABP(mean): --  RR: 18 (08 Jan 2022 12:20) (18 - 20)  SpO2: 94% (08 Jan 2022 12:20) (94% - 100%) on 40% high flow nasal canula @ 40lpm     General: Awake. Alert. Cooperative. No distress. Appears stated age 	  HEENT: Atraumatic. Normocephalic. Anicteric. Normal oral mucosa. PERRL. EOMI.  Neck: Supple. Trachea midline. Thyroid without enlargement/tenderness/nodules. No carotid bruit. No JVD. Short and wide  Cardiovascular: Regular rate and rhythm. S1 S2 normal. III/VI systolic murmur  Respiratory: Respirations unlabored. Improved breath sounds left hemithorax. No curvature.  Abdomen: Soft. Non-tender. Non-distended. No organomegaly. No masses. Normal bowel sounds. Obese, PEG.  Extremities: Warm to touch. No clubbing or cyanosis. No pedal edema.   Pulses: 2+ peripheral pulses all extremities.	  Skin: Normal skin color. No rashes or lesions. No ecchymoses. No cyanosis. Warm to touch.  Lymph Nodes: Cervical, supraclavicular and axillary nodes normal  Neurological: Left lower extremity plegia with contraction. Left upper extremity is quite weak. A and O x 3  Psychiatry: Appropriate mood and affect.    LABS:                          8.8    5.34  )-----------( 278      ( 07 Jan 2022 08:04 )             29.6     CBC    WBC  5.34 <==, 5.06 <==, 2.70 <==, 5.05 <==, 6.78 <==, 5.38 <==, 5.66 <==    Hemoglobin  8.8 <<==, 8.6 <<==, 4.3 <<==, 8.7 <<==, 9.3 <<==, 9.3 <<==, 8.6 <<==    Hematocrit  29.6 <==, 29.1 <==, 15.8 <==, 29.3 <==, 30.6 <==, 30.7 <==, 28.1 <==    Platelets  278 <==, 272 <==, 129 <==, 277 <==, 298 <==, 266 <==, 256 <==      142  |  105  |  8   ----------------------------<  140<H>    01-07  4.3   |  24  |  0.55      LYTES    sodium  142 <==, 140 <==, 144 <==, 139 <==, 137 <==, 134 <==    potassium   4.3 <==, 4.2 <==, 4.2 <==, 4.0 <==, 4.1 <==, 4.3 <==    chloride  105 <==, 105 <==, 107 <==, 105 <==, 103 <==, 100 <==    carbon dioxide  24 <==, 22 <==, 18 <==, 17 <==, 21 <==, 22 <==    =============================================================================================  RENAL FUNCTION:    Creatinine:   0.55  <<==, 0.56  <<==, 0.62  <<==, 0.62  <<==, 0.56  <<==, 0.56  <<==    BUN:   8 <==, 8 <==, 10 <==, 10 <==, 10 <==, 15 <==    ============================================================================================    calcium   9.4 <==, 9.5 <==, 9.3 <==, 9.4 <==, 8.7 <==, 8.8 <==    phos   4.1 <==    mag   1.9 <==    ============================================================================================  LFTs    AST:   17 <== , 14 <== , 14 <==     ALT:  20  <== , 22  <== , 26  <==     AP:  86  <=, 82  <=, 88  <=    Bili:  0.3  <=, 0.2  <=, 0.2  <=    Venous Blood Gas:  01-05 @ 06:53  7.32/46/45/24/73.0  VBG Lactate: 2.3    ABG - ( 05 Jan 2022 00:27 )  pH: 7.39  /  pCO2: 34    /  pO2: 75    / HCO3: 21    / Base Excess: -3.8  /  SaO2: 96.7      < from: Transthoracic Echocardiogram (12.21.21 @ 14:39) >    Patient name: FRANKI MEHTA  YOB: 1935   Age: 86 (F)   MR#: [id number]  Study Date: 12/21/2021  Location: [address] [place], [place] [id number]: T[facility]  Study quality: Technically difficult/Limited  Referring Physician: Edmond Almazan MD  Blood Pressure: 134/77 mmHg  Height: 163 cm  Weight: 109 kg  BSA: 2.1 m2  Heart Rate: 105 mmHg  ------------------------------------------------------------------------  PROCEDURE: Transthoracic echocardiogram with 2-D, M-Mode  and complete spectral andcolor flow Doppler.  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 No segmental  wall motion abnormalities.  Moderate aortic stenosis.  There may be a vegetation on the left or non-coronary cusp  of the aortic valve. Consider transesophageal  echocardiography.  ------------------------------------------------------------------------  Confirmed on  12/22/2021 - 10:32:40 by Rahul Correa M.D.  ------------------------------------------------------------------------  MICROBIOLOGY:     COVID-19 PCR . (01.04.22 @ 17:38)   COVID-19 PCR: NotDetec:     Culture - Bronchial (01.06.22 @ 18:48)   Gram Stain:   No polymorphonuclear cells seen per low power field   No squamous epithelial cells per low power field   No organisms seen per oil power field   Specimen Source: Bronch Wash Bronchoalveolar Lavage     Culture - Blood (12.24.21 @ 17:38)   Specimen Source: .Blood Blood-Venous   Culture Results:   No Growth Final     Culture - Blood (12.24.21 @ 15:22)   Specimen Source: .Blood Blood-Peripheral   Culture Results:   No Growth Final     Culture - Abscess with Gram Stain (12.23.21 @ 18:49)   Specimen Source: .Abscess abdominal abscess   Culture Results:   Few Escherichia coli   Few Morganella morganii   Moderate Enterococcus avium   Numerous Bacteroides thetaiotamcron group "Susceptibilities not performed"   Numerous Clostridium ramosum "Susceptibilities not performed"     Culture - Blood (12.19.21 @ 14:24)   Culture Results:   Growth in aerobic bottle: Staphylococcus epidermidis   Organism Identification: Staphylococcus epidermidis     Culture - Blood (12.19.21 @ 14:24)   Gram Stain:   Growth in aerobic bottle: Gram Positive Cocci in Clusters   Growth in anaerobic bottle: Gram Positive Cocci in Clusters   - Staphylococcus epidermidis, Methicillin resistant: Detec     RADIOLOGY:  [x ] Chest radiographs reviewed and interpreted by me    EXAM:  XR CHEST PORTABLE ROUTINE 1V                        EXAM:  XR CHEST PORTABLE URGENT 1V                          PROCEDURE DATE:  01/08/2022      INTERPRETATION:  Clinical Information: Status post bronchoscopy.    Technique: 2 AP chest x-ray(s).    Comparison: 01/06/2022    Findings/  Impression: Left PICC tip at the SVC. Cardiomegaly. Improved aeration of   the left lung, although with persistent small patchy opacities.    YAMILET OWEN MD; Attending Interventional Radiologist  This document has been electronically signed. Jan 8 2022 10:27AM  --------------------------------------------------------------------------------------------------------------   EXAM:  XR CHEST PORTABLE URGENT 1V                          PROCEDURE DATE:  01/06/2022      INTERPRETATION:  EXAMINATION: XR CHEST URGENT    FINDINGS:  Left humeral hardware. Left upper extremity PICC terminates in SVC.  The heart is not accurately assessed in this projection. Mediastinum is   shifted to the left..  Whiteout of the left lung. The right lung is free of consolidation.  No pneumothorax.    IMPRESSION:  Left lung collapse, unchanged.    JUANITA WALKER M.D., RADIOLOGY RESIDENT  This document has been electronically signed.  DIANE CLARK MD; Attending Radiologist  This document has been electronically signed. Jan 6 2022  9:54PM  --------------------------------------------------------------------------------------------------------------  EXAM:  XR CHEST PORTABLE URGENT 1V                        EXAM:  XR CHEST PORTABLE URGENT 1V                          PROCEDURE DATE:  01/05/2022      INTERPRETATION:  EXAMINATION: XR CHEST URGENT, XR CHEST URGENT    CLINICAL INDICATION: Shortness of Breath, follow up    TECHNIQUE: Frontal, portable views of the chest were obtained at 1/4/2022   11:25 PM and 1/5/2022 6:49 AM    COMPARISON: Chest x-ray 12/25/2021.    FINDINGS:  Airway machine/device over left chest and neck.  Heart size cannot be assessed in this projection.  White out of the left lung with tracheal deviation towards the left,   consistent with left lung collapse. The right lung is clear.  There is no pneumothorax or pleural effusion.    IMPRESSION:  Left lung collapse.    HENRIQUE CRUZ MD; Resident Radiologist  This document has been electronically signed.  PRAVIN CASTILLO MD; Attending Radiologist  This document has been electronically signed. Jan 5 2022 10:11AM  --------------------------------------------------------------------------------------------------------------  EXAM:  CT ABDOMEN AND PELVIS IC                        EXAM:  CT CHEST IC                          PROCEDURE DATE:  12/19/2021      FINDINGS:    CHEST:  Motion degradation, particularly at the lung bases.    LUNGS AND LARGE AIRWAYS: Debris within the left lobar and more distal   airways of the left lower lobe. There is likely some debris within the   right lower lobe airways as well, though motion degradation limits   evaluation. Complete atelectasis of the left lower lobe. Subsegmental   atelectatic changes within the left upper lobe and dependent portions of   the right lung.  PLEURA: No pleural effusion. No pneumothorax.  VESSELS: Atherosclerotic changes. Coronary artery calcifications.  HEART: Heart size is normal. Aortic valve calcification. No pericardial   effusion.  MEDIASTINUM AND GIOVANA: No lymphadenopathy.  CHEST WALL AND LOWER NECK: Right mastectomy.    ABDOMEN AND PELVIS:  Motion degradation, most pronounced in the upper abdomen.    LIVER: Normal size and morphology. Subcentimeter calcified granuloma in   the right lobe. No suspicious liver lesion. Hepatic and portal veins are   patent.  BILE DUCTS: Hyperdensity in the region of the distal common bile duct   (series 3 image 130), which may reflect choledocholithiasis or adjacent   pancreatic parenchymal calcification. Bile ducts are of normal caliber,   with the common bile duct measuring up to 5 mm in diameter.  GALLBLADDER: Cholelithiasis.  SPLEEN: Within normal limits.  PANCREAS: Hypodense cystic appearing lesion measuring 1.0 cm in the   pancreatic tail (series 3 image 108), which is new since 7/2/2017, but is   unchanged since 5/28/2021. No main pancreatic duct dilation.  ADRENALS: Within normal limits.  KIDNEYS/URETERS: Within normal limits.    BLADDER: Within normal limits.  REPRODUCTIVEORGANS: Thickening of the endometrial complex at the fundus,   measuring up to 1.6 cm.    BOWEL:  Fluid collection within the right lower quadrant measuring 7.3 x 3.9 x   5.7 cm (series 3 image 209) with surrounding fatty infiltration. The   collection is associated with the tip of the appendix, which is   indistinct, with findings concerning for perforated appendicitis. No   pneumoperitoneum. There is a punctate 1 to 2 mm calcific density within   the collection (series 3 image 12), which may represent an appendicolith.    Small hiatal hernia. Percutaneous gastrostomy tube, the balloon located   within the gastric antrum. Duodenal diverticulum arising from the third   portion of the duodenum. Colonic diverticulosis. No bowel obstruction.    PERITONEUM: Right lower quadrant fluid collection, as described above.  VESSELS: Atherosclerotic changes.  RETROPERITONEUM/LYMPH NODES: No lymphadenopathy.  ABDOMINAL WALL: Small fat-containing umbilical hernia on a background of   diastases recti. Percutaneous gastrostomy tube.  BONES: Fixation hardware of the proximal left humerus. Degenerative   changes. Osseous demineralization. Mild to moderate loss of height of the   T11 and L1 vertebral bodies, unchanged since 2017.    Soft tissue density measuring 2.8 x 1.8 cm centered within the right   posterior medial acetabulum with associated erosion of the cortex (series   4 image 794). Findings are new since 7/2/2017.    IMPRESSION:  Findings concerning for perforated appendicitis with a right lower   quadrant collection associated with the appendiceal tip.    Complete atelectasis of the left lower lobe with debris occupying the   left lower lobe airways. An underlying pneumonia is not excluded.    Indeterminate lesion within the right acetabulum with associated osseous   erosion. Malignancy is not excluded.    Cholelithiasis and suspected choledocholithiasis. No biliary duct   dilation.    Thickening of the endometrial complex. Pelvic ultrasound may be performed   for further assessment.    Pancreatic tail lesion measuring 1 cm, possibly a side branch IPMN. This   can be further assessed with nonemergent contrast enhanced abdominal   MRI/MRCP.    Findings were discussed with Dr. Wade 12/20/2021 9:14 AM by Dr. Alan with readback confirmation.    NICANOR ALAN MD; Attending Radiologist  This document has been electronically signed. Dec 20 2021  9:17AM  ---------------------------------------------------------------------------------------------------------------

## 2022-01-08 NOTE — PROGRESS NOTE ADULT - ASSESSMENT
86   year old female      h/o hemorrhagic CVA, has  PEG,  HTN, MI,      HLD, gout   right Ca breast. s/p mastectomy in 2019,  s/p  sentinel node  localization.  4/4,  were  negative         *  p/w SOB and  acute respiratory distress,  was  on   bipap  in er   with  elevated wbc of 17,000 on arrival,  cxr,? pl effusion, right  lung opacit   *   s/p cva, has  PEG,  npo/,  on  synthroid, Keppra  ,  *  HTN, on meds  per  card  prior  echo,  normal ef  *  h/o ca breast. /, s/p  R  mastectomy  * obesity, bmi is  41  *  bacteremia. / staph epi, meth R  ct  c/a/p, ?  pna, perforated  appendicitis,  ?  mets  to  acetabulum   surg/ IR  and  oncology eval dr pacheco  ,   per  surg, no  intervention  *   sepsis  on  arrival, is  resolving  from  perforated  appendix/ and  Staph epi  bacteremia which is  persistent,  hence  cannot  dismiss  it as  a contaminant    and  also, with  echo, vegetation on  aortic  valve/ endocarditis,    ?  esdras,  may  not  change   rx  plan,  will need  extended  course  of  ab  , per  card   rpt ct   RLL  phlegmon,  and  80  cc of  pus  drained by  IR . c/s  with  ecoli, e coccus  and  morganell  per card , pt high risk for esdras  and given that . this will not alter rx. pt  will need   extended  course  of ab     on iv  vanco and ertapenem  peg dislodged.  IR   replacements  on 1/3   picc  and iv ab  for 6 weeks, per  ID  s/p rrt   for hypoxia. cxr with complete  collapsed  of  left lung/  pulm d r clarke   s/ p  bronchoscopy  /   pt  with  tracheomalacia  and  collapsed  airways,  makes  this a  difficult  situation, as there is no good rx for this  follow  cxr     goc/ pt is a full code. per  family         rd< from: Xray Chest 1 View- PORTABLE-Urgent (12.19.21 @ 09:20) >  IMPRESSION:  Low lung volumes. New small left lower lung hazy opacity may represent   atelectasis versus pleural effusion. Redemonstration of right upper lung   perihilar opacity largely unchanged prior exam.  --- End of Report --  < end of copied text >

## 2022-01-08 NOTE — PROGRESS NOTE ADULT - SUBJECTIVE AND OBJECTIVE BOX
afberile  REVIEW OF SYSTEMS:  GEN: no fever,    no chills  RESP: no SOB,   no cough  CVS: no chest pain,   no palpitations  GI: no abdominal pain,   no nausea,   no vomiting,   no constipation,   no diarrhea  : no dysuria,   no frequency  NEURO: no headache,   no dizziness  PSYCH: no depression,   not anxious  Derm : no rash    MEDICATIONS  (STANDING):  acetylcysteine 20%  Inhalation 4 milliLiter(s) Inhalation three times a day  albuterol/ipratropium for Nebulization 3 milliLiter(s) Nebulizer every 6 hours  allopurinol 100 milliGRAM(s) Oral daily  artificial  tears Solution 1 Drop(s) Both EYES two times a day  artificial tears (preservative free) Ophthalmic Solution 1 Drop(s) Both EYES four times a day  atorvastatin 20 milliGRAM(s) Oral at bedtime  chlorhexidine 4% Liquid 1 Application(s) Topical <User Schedule>  dextrose 40% Gel 15 Gram(s) Oral once  dextrose 5% + sodium chloride 0.9%. 1000 milliLiter(s) (50 mL/Hr) IV Continuous <Continuous>  dextrose 5%. 1000 milliLiter(s) (50 mL/Hr) IV Continuous <Continuous>  dextrose 5%. 1000 milliLiter(s) (100 mL/Hr) IV Continuous <Continuous>  dextrose 50% Injectable 25 Gram(s) IV Push once  dextrose 50% Injectable 12.5 Gram(s) IV Push once  dextrose 50% Injectable 25 Gram(s) IV Push once  ertapenem  IVPB 1000 milliGRAM(s) IV Intermittent every 24 hours  glucagon  Injectable 1 milliGRAM(s) IntraMuscular once  heparin   Injectable 5000 Unit(s) SubCutaneous every 8 hours  insulin lispro (ADMELOG) corrective regimen sliding scale   SubCutaneous every 6 hours  levETIRAcetam  Solution 750 milliGRAM(s) Oral two times a day  levothyroxine 75 MICROGram(s) Oral daily  metoprolol tartrate 50 milliGRAM(s) Oral two times a day  nystatin Cream 1 Application(s) Topical two times a day  sodium chloride 3%  Inhalation 4 milliLiter(s) Inhalation every 6 hours  vancomycin  IVPB 1000 milliGRAM(s) IV Intermittent every 24 hours    MEDICATIONS  (PRN):  acetaminophen     Tablet .. 650 milliGRAM(s) Oral every 6 hours PRN Temp greater or equal to 38C (100.4F), Mild Pain (1 - 3)  melatonin 3 milliGRAM(s) Oral at bedtime PRN Insomnia  sodium chloride 0.9% lock flush 10 milliLiter(s) IV Push every 1 hour PRN Pre/post blood products, medications, blood draw, and to maintain line patency      Vital Signs Last 24 Hrs  T(C): 36.6 (08 Jan 2022 04:57), Max: 37.1 (07 Jan 2022 09:41)  T(F): 97.8 (08 Jan 2022 04:57), Max: 98.7 (07 Jan 2022 09:41)  HR: 93 (08 Jan 2022 04:57) (66 - 93)  BP: 101/59 (08 Jan 2022 04:57) (91/52 - 129/79)  BP(mean): --  RR: 18 (08 Jan 2022 04:57) (18 - 20)  SpO2: 97% (08 Jan 2022 04:57) (92% - 100%)  CAPILLARY BLOOD GLUCOSE      POCT Blood Glucose.: 166 mg/dL (07 Jan 2022 23:35)  POCT Blood Glucose.: 143 mg/dL (07 Jan 2022 17:30)  POCT Blood Glucose.: 167 mg/dL (07 Jan 2022 12:44)  POCT Blood Glucose.: 144 mg/dL (07 Jan 2022 06:55)    I&O's Summary    06 Jan 2022 07:01  -  07 Jan 2022 07:00  --------------------------------------------------------  IN: 0 mL / OUT: 1000 mL / NET: -1000 mL    07 Jan 2022 07:01  -  08 Jan 2022 06:21  --------------------------------------------------------  IN: 1040 mL / OUT: 500 mL / NET: 540 mL        PHYSICAL EXAM:  HEAD:  Atraumatic, Normocephalic  NECK: Supple, No   JVD  CHEST/LUNG:   no     rales,     no,    rhonchi  HEART: Regular rate and rhythm;         murmur  ABDOMEN: Soft, Nontender, ;   EXTREMITIES:    no   edema  NEUROLOGY:  alert    LABS:                        8.8    5.34  )-----------( 278      ( 07 Jan 2022 08:04 )             29.6     01-07    142  |  105  |  8   ----------------------------<  140<H>  4.3   |  24  |  0.55    Ca    9.4      07 Jan 2022 08:04                      Thyroid Stimulating Hormone, Serum: 2.39 uIU/mL (01-07 @ 09:13)          Consultant(s) Notes Reviewed:      Care Discussed with Consultants/Other Providers:

## 2022-01-08 NOTE — PROGRESS NOTE ADULT - ASSESSMENT
ASSESSMENT:    86 year old gentlewoman, lifelong non-smoker, without history of intrinsic lung disease. 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 She has a history of HTN, HLD, CAD s/p MI with preserved LVEF and moderate aortic stenosis. The patient was admitted on 12/19 with fever and abdominal pain. She was found to have perforated appendicitis with abscess formation. The patient has been treated with tube drainage (removed several days ago) and antibiotics for a polymicrobial infection (currently on vancomycin and ertapenem). CT scan on admission had debris within the left lobar and more distal airways of the left lower lobe as well as some debris within the right lower lobe airway - there was complete atelectasis of the left lower lobe and subsegmental atelectatic changes within the left upper lobe and dependent portions of the right lung. This was likely due to the patient's weak cough following her CVA. Overnight, the patient developed increased work of breathing, tachycardia, tachypnea and hypoxemia. The patient has been placed on BIPAP which she is finding very uncomfortable. She is currently without respiratory distress and without hypoxemia on FiO2 40%. She has no cough, sputum production, chest congestion or wheeze. No fevers, chills or sweats. No chest pain/pressure or palpitations. CXR "to my eye" reveals  complete opacification of the left hemithorax with leftward tracheal deviation c/w worsening mucous plugging and complete left lung atelectasis    PLAN/RECOMMENDATIONS:    no shortness of breath or hypoxemia on a 40% high flow nasal canula @ 40 lpm -> hopeful transition to a nasal canula @ 6lpm later today if oxygenation remains stable  s/p bronchoscopy with removal of copious amounts of purulent mucous from throughout the airways - there was severe tracheobronchomalacia  NIV for sleep - high flow nasal canula during the day -> taper as tolerated  following CXR -. improved aeration of the left lung  continue albuterol/atrovent nebs q6h  continue hypertonic saline and mucomyst nebs to facilitate mucous clearance after bronchoscopy  continue chest vest therapy   continue aggressive manual chest PT  continue vancomycin x 6 weeks for methicillin resistant Staphylococcus epidermidis bacteremia - ertapenem - ID follow-up noted   cardiac meds: lopressor/lipitor  DVT prophylaxis - SQ heparin  allopurinol/keppra/synthroid  glucose control    Will follow with you. Plan of care discussed with the patient at bedside.    Kennedy Marcano MD, [facility]  [phone]  Pulmonary Medicine

## 2022-01-09 NOTE — PROGRESS NOTE ADULT - ASSESSMENT
86   year old female      h/o hemorrhagic CVA, has  PEG,  HTN, MI,      HLD, gout   right Ca breast. s/p mastectomy in 2019,  s/p  sentinel node  localization.  4/4,  were  negative         *  p/w SOB and  acute respiratory distress,  was  on   bipap  in er   with  elevated wbc of 17,000 on arrival,  cxr,? pl effusion, right  lung opacit   *   s/p cva, has  PEG,  npo/,  on  synthroid, Keppra  ,  *  HTN, on meds  per  card  prior  echo,  normal ef  *  h/o ca breast. /, s/p  R  mastectomy  * obesity, bmi is  41  *  bacteremia. / staph epi, meth R  ct  c/a/p, ?  pna, perforated  appendicitis,  ?  mets  to  acetabulum   surg/ IR  and  oncology eval dr pacheco  ,   per  surg, no  intervention  *   sepsis  on  arrival, is  resolving  from  perforated  appendix/ and  Staph epi  bacteremia which is  persistent,  hence  cannot  dismiss  it as  a contaminant    and  also, with  echo, vegetation on  aortic  valve/ endocarditis,    ?  esdras,  may  not  change   rx  plan,  will need  extended  course  of  ab  , per  card   rpt ct   RLL  phlegmon,  and  80  cc of  pus  drained by  IR . c/s  with  ecoli, e coccus  and  morganell  per card , pt high risk for esdras  and given that . this will not alter rx. pt  will need   extended  course  of ab     on iv  vanco and ertapenem  peg dislodged.  IR   replacements  on 1/3   picc  and iv ab  for 6 weeks, per  ID  s/p rrt   for hypoxia. cxr with complete  collapsed  of  left lung/  pulm magnus r clarke   s/ p  bronchoscopy  /   pt  with  tracheomalacia  and  collapsed  airways,  makes  this a  difficult  situation, as there is no good rx for this  follow  cxr  cxr  with omprovement    d/c  ,  when cleraed by pulm     goc/ pt is a full code. per  family         rd< from: Xray Chest 1 View- PORTABLE-Urgent (12.19.21 @ 09:20) >  IMPRESSION:  Low lung volumes. New small left lower lung hazy opacity may represent   atelectasis versus pleural effusion. Redemonstration of right upper lung   perihilar opacity largely unchanged prior exam.  --- End of Report --  < end of copied text >

## 2022-01-09 NOTE — PROGRESS NOTE ADULT - SUBJECTIVE AND OBJECTIVE BOX
CARDIOLOGY     PROGRESS  NOTE   ________________________________________________    CHIEF COMPLAINT:Patient is a 86y old  Female who presents with a chief complaint of fevers/sob (09 Jan 2022 06:10)  no complain.  	  REVIEW OF SYSTEMS:  CONSTITUTIONAL: No fever, weight loss, or fatigue  EYES: No eye pain, visual disturbances, or discharge  ENT:  No difficulty hearing, tinnitus, vertigo; No sinus or throat pain  NECK: No pain or stiffness  RESPIRATORY: No cough, wheezing, chills or hemoptysis; No Shortness of Breath  CARDIOVASCULAR: No chest pain, palpitations, passing out, dizziness, or leg swelling  GASTROINTESTINAL: No abdominal or epigastric pain. No nausea, vomiting, or hematemesis; No diarrhea or constipation. No melena or hematochezia.  GENITOURINARY: No dysuria, frequency, hematuria, or incontinence  NEUROLOGICAL: No headaches, memory loss, loss of strength, numbness, or tremors  SKIN: No itching, burning, rashes, or lesions   LYMPH Nodes: No enlarged glands  ENDOCRINE: No heat or cold intolerance; No hair loss  MUSCULOSKELETAL: No joint pain or swelling; No muscle, back, or extremity pain  PSYCHIATRIC: No depression, anxiety, mood swings, or difficulty sleeping  HEME/LYMPH: No easy bruising, or bleeding gums  ALLERGY AND IMMUNOLOGIC: No hives or eczema	    [ ] All others negative	  [x ] Unable to obtain    PHYSICAL EXAM:  T(C): 37.1 (01-09-22 @ 09:17), Max: 37.1 (01-09-22 @ 09:17)  HR: 92 (01-09-22 @ 09:17) (83 - 104)  BP: 102/72 (01-09-22 @ 09:17) (102/72 - 120/46)  RR: 18 (01-09-22 @ 09:17) (18 - 20)  SpO2: 97% (01-09-22 @ 09:17) (94% - 97%)  Wt(kg): --  I&O's Summary    08 Jan 2022 07:01  -  09 Jan 2022 07:00  --------------------------------------------------------  IN: 1130 mL / OUT: 700 mL / NET: 430 mL        Appearance: Normal	  HEENT:   Normal oral mucosa, PERRL, EOMI	  Lymphatic: No lymphadenopathy  Cardiovascular: Normal S1 S2, No JVD, + murmurs, No edema  Respiratory: rhonchi  Psychiatry: A & O x 3, Mood & affect appropriate  Gastrointestinal:  Soft, Non-tender, + BS, peg  Skin: No rashes, No ecchymoses, No cyanosis	  Neurologic: Non-focal  Extremities: Normal range of motion, No clubbing, cyanosis or edema  Vascular: Peripheral pulses palpable 2+ bilaterally    MEDICATIONS  (STANDING):  acetylcysteine 20%  Inhalation 4 milliLiter(s) Inhalation three times a day  albuterol/ipratropium for Nebulization 3 milliLiter(s) Nebulizer every 6 hours  allopurinol 100 milliGRAM(s) Oral daily  artificial  tears Solution 1 Drop(s) Both EYES two times a day  artificial tears (preservative free) Ophthalmic Solution 1 Drop(s) Both EYES four times a day  atorvastatin 20 milliGRAM(s) Oral at bedtime  chlorhexidine 4% Liquid 1 Application(s) Topical <User Schedule>  dextrose 40% Gel 15 Gram(s) Oral once  dextrose 5% + sodium chloride 0.9%. 1000 milliLiter(s) (50 mL/Hr) IV Continuous <Continuous>  dextrose 5%. 1000 milliLiter(s) (50 mL/Hr) IV Continuous <Continuous>  dextrose 5%. 1000 milliLiter(s) (100 mL/Hr) IV Continuous <Continuous>  dextrose 50% Injectable 25 Gram(s) IV Push once  dextrose 50% Injectable 12.5 Gram(s) IV Push once  dextrose 50% Injectable 25 Gram(s) IV Push once  ertapenem  IVPB 1000 milliGRAM(s) IV Intermittent every 24 hours  glucagon  Injectable 1 milliGRAM(s) IntraMuscular once  heparin   Injectable 5000 Unit(s) SubCutaneous every 8 hours  insulin lispro (ADMELOG) corrective regimen sliding scale   SubCutaneous every 6 hours  levETIRAcetam  Solution 750 milliGRAM(s) Oral two times a day  levothyroxine 75 MICROGram(s) Oral daily  metoprolol tartrate 50 milliGRAM(s) Oral two times a day  nystatin Cream 1 Application(s) Topical two times a day  sodium chloride 3%  Inhalation 4 milliLiter(s) Inhalation every 6 hours  vancomycin  IVPB 1000 milliGRAM(s) IV Intermittent every 24 hours      TELEMETRY: 	    ECG:  	  RADIOLOGY:  OTHER: 	  	  LABS:	 	    CARDIAC MARKERS:                  proBNP: Serum Pro-Brain Natriuretic Peptide: 375 pg/mL (12-19 @ 08:45)    Lipid Profile:   HgA1c:   TSH: Thyroid Stimulating Hormone, Serum: 2.39 uIU/mL (01-07 @ 09:13)          Assessment and plan  ---------------------------  87 yo F from [facility], CVA with residual left weakness (~3 years ago), PEG for dysphagia, hypothyroid, COPD (on no home Oxygen), HTN, gout, p/w fever, Pt has respiratory distress, wheezing, concerning for respiratory infection, otherwise no other symptoms is reported on the chart. EMS gave solumedrol 125 through peripheral, small iv line.  pt is well known to me with hx of htn, ashd, s/p MI, cva with increasing sob on bipap in er.  can not get any hx from the pt.  continue bp meds  dvt prophylaxis  will consider to possible repeat echo  duoneb  dvt prophylaxis  ct scan/ surgery/ ID noted  continue amlodipine and Metoprolol for now  tachycardia sec to underlying condition, continue to observe  oob to chair as tolerated  replete K  abx as per ID on vanco fu level  repeat blood cultures, negative  will repeat  TTE in 6 weeks  on iv beta blocker for bp/hr control  events noted  ?mucus pluge/ bronch/ pulmonary appreciated, l lung collapse  observe closely  doing better, pulmonary follow up, chest x ray noted  oob to chair  continue dvt prophylaxis

## 2022-01-09 NOTE — PROGRESS NOTE ADULT - SUBJECTIVE AND OBJECTIVE BOX
[facility] - PROGRESS NOTE    CHIEF COMPLAINT: acute hypoxic respiratory failure; mucous plugging; left lung atelectasis; weak cough; dysphagia; h/o CVA; perforated appendicitis with abscess formation    INTERVAL HISTORY: s/p bronchoscopy with removal of copious amounts of purulent secretions from throughout the bronchial tree - there was severe tracheobronchomalacia preventing mucous removal; CXR this AM with continued increased aeration in the left lung; no shortness of breath or hypoxemia on a 40% high flow nasal canula; occasional weak cough productive of scant sputum; no chest congestion or wheeze; no fevers, chills or sweats; no chest pain/pressure or palpitations; bedbound; enormously tired    REVIEW OF SYSTEMS:  Constitutional: As per interval history  HEENT: Within normal limits  CV: As per interval history  Resp: As per interval history  GI: dysphagia -> PEG   : Within normal limits  Musculoskeletal: Within normal limits  Skin: Within normal limits  Neurological: CVA -> left sided weakness  Psychiatric: Within normal limits  Endocrine: Within normal limits  Hematologic/Lymphatic: Within normal limits  Allergic/Immunologic: Within normal limits    MEDICATIONS:     Pulmonary "  acetylcysteine 20%  Inhalation 4 milliLiter(s) Inhalation three times a day  albuterol/ipratropium for Nebulization 3 milliLiter(s) Nebulizer every 6 hours  sodium chloride 3%  Inhalation 4 milliLiter(s) Inhalation every 6 hours    Anti-microbials:  ertapenem  IVPB 1000 milliGRAM(s) IV Intermittent every 24 hours  vancomycin  IVPB 1000 milliGRAM(s) IV Intermittent every 24 hours    Cardiovascular:  metoprolol tartrate 50 milliGRAM(s) Oral two times a day    Other:  allopurinol 100 milliGRAM(s) Oral daily  artificial  tears Solution 1 Drop(s) Both EYES two times a day  artificial tears (preservative free) Ophthalmic Solution 1 Drop(s) Both EYES four times a day  atorvastatin 20 milliGRAM(s) Oral at bedtime  chlorhexidine 4% Liquid 1 Application(s) Topical <User Schedule>  dextrose 40% Gel 15 Gram(s) Oral once  dextrose 5% + sodium chloride 0.9%. 1000 milliLiter(s) IV Continuous <Continuous>  dextrose 5%. 1000 milliLiter(s) IV Continuous <Continuous>  dextrose 5%. 1000 milliLiter(s) IV Continuous <Continuous>  dextrose 50% Injectable 25 Gram(s) IV Push once  dextrose 50% Injectable 12.5 Gram(s) IV Push once  dextrose 50% Injectable 25 Gram(s) IV Push once  glucagon  Injectable 1 milliGRAM(s) IntraMuscular once  heparin   Injectable 5000 Unit(s) SubCutaneous every 8 hours  insulin lispro (ADMELOG) corrective regimen sliding scale   SubCutaneous every 6 hours  levETIRAcetam  Solution 750 milliGRAM(s) Oral two times a day  levothyroxine 75 MICROGram(s) Oral daily  nystatin Cream 1 Application(s) Topical two times a day    MEDICATIONS  (PRN):  acetaminophen     Tablet .. 650 milliGRAM(s) Oral every 6 hours PRN Temp greater or equal to 38C (100.4F), Mild Pain (1 - 3)  melatonin 3 milliGRAM(s) Oral at bedtime PRN Insomnia  sodium chloride 0.9% lock flush 10 milliLiter(s) IV Push every 1 hour PRN Pre/post blood products, medications, blood draw, and to maintain line patency    OBJECTIVE:    I&O's Detail    08 Jan 2022 07:01  -  09 Jan 2022 07:00  --------------------------------------------------------  IN:    dextrose 5% + sodium chloride 0.9%: 500 mL    Glucerna: 630 mL  Total IN: 1130 mL    OUT:    Voided (mL): 700 mL  Total OUT: 700 mL    Total NET: 430 mL    POCT Blood Glucose.: 145 mg/dL (09 Jan 2022 12:05)  POCT Blood Glucose.: 137 mg/dL (09 Jan 2022 06:52)  POCT Blood Glucose.: 119 mg/dL (08 Jan 2022 23:55)  POCT Blood Glucose.: 164 mg/dL (08 Jan 2022 17:04)      PHYSICAL EXAM:       ICU Vital Signs Last 24 Hrs  T(C): 37.1 (09 Jan 2022 09:17), Max: 37.1 (09 Jan 2022 09:17)  T(F): 98.7 (09 Jan 2022 09:17), Max: 98.7 (09 Jan 2022 09:17)  HR: 96 (09 Jan 2022 09:33) (83 - 98)  BP: 102/72 (09 Jan 2022 09:17) (102/72 - 120/46)  BP(mean): --  ABP: --  ABP(mean): --  RR: 19 (09 Jan 2022 09:33) (18 - 20)  SpO2: 94% (09 Jan 2022 09:33) (94% - 97%) on 40% high flow nasal canula     General: Awake. Alert. Cooperative. No distress. Appears stated age. Tired  HEENT: Atraumatic. Normocephalic. Anicteric. Normal oral mucosa. PERRL. EOMI.  Neck: Supple. Trachea midline. Thyroid without enlargement/tenderness/nodules. No carotid bruit. No JVD. Short and wide  Cardiovascular: Regular rate and rhythm. S1 S2 normal. III/VI systolic murmur  Respiratory: Respirations unlabored. Improved breath sounds left hemithorax. No curvature.  Abdomen: Soft. Non-tender. Non-distended. No organomegaly. No masses. Normal bowel sounds. Obese, PEG.  Extremities: Warm to touch. No clubbing or cyanosis. No pedal edema.   Pulses: 2+ peripheral pulses all extremities.	  Skin: Normal skin color. No rashes or lesions. No ecchymoses. No cyanosis. Warm to touch.  Lymph Nodes: Cervical, supraclavicular and axillary nodes normal  Neurological: Left lower extremity plegia with contraction. Left upper extremity is quite weak. A and O x 3  Psychiatry: Appropriate mood and affect.    LABS:      CBC    WBC  5.34 <==, 5.06 <==, 2.70 <==, 5.05 <==, 6.78 <==, 5.38 <==, 5.66 <==    Hemoglobin  8.8 <<==, 8.6 <<==, 4.3 <<==, 8.7 <<==, 9.3 <<==, 9.3 <<==, 8.6 <<==    Hematocrit  29.6 <==, 29.1 <==, 15.8 <==, 29.3 <==, 30.6 <==, 30.7 <==, 28.1 <==    Platelets  278 <==, 272 <==, 129 <==, 277 <==, 298 <==, 266 <==, 256 <==      142  |  105  |  8   ----------------------------<  140<H>    01-07  4.3   |  24  |  0.55      LYTES    sodium  142 <==, 140 <==, 144 <==, 139 <==, 137 <==    potassium   4.3 <==, 4.2 <==, 4.2 <==, 4.0 <==, 4.1 <==    chloride  105 <==, 105 <==, 107 <==, 105 <==, 103 <==    carbon dioxide  24 <==, 22 <==, 18 <==, 17 <==, 21 <==    =============================================================================================  RENAL FUNCTION:    Creatinine:   0.55  <<==, 0.56  <<==, 0.62  <<==, 0.62  <<==, 0.56  <<==    BUN:   8 <==, 8 <==, 10 <==, 10 <==, 10 <==    ============================================================================================    calcium   9.4 <==, 9.5 <==, 9.3 <==, 9.4 <==, 8.7 <==    phos   4.1 <==    mag   1.9 <==    ============================================================================================  LFTs    AST:   17 <== , 14 <== , 14 <==     ALT:  20  <== , 22  <== , 26  <==     AP:  86  <=, 82  <=, 88  <=    Bili:  0.3  <=, 0.2  <=, 0.2  <=    Venous Blood Gas:  01-05 @ 06:53  7.32/46/45/24/73.0  VBG Lactate: 2.3    ABG - ( 05 Jan 2022 00:27 )  pH: 7.39  /  pCO2: 34    /  pO2: 75    / HCO3: 21    / Base Excess: -3.8  /  SaO2: 96.7      < from: Transthoracic Echocardiogram (12.21.21 @ 14:39) >    Patient name: FRANKI MEHTA  YOB: 1935   Age: 86 (F)   MR#: [id number]  Study Date: 12/21/2021  Location: [address] [place], [place] [id number]: Tena Garnett RDCS  Study quality: Technically difficult/Limited  Referring Physician: Edmond Almazan MD  Blood Pressure: 134/77 mmHg  Height: 163 cm  Weight: 109 kg  BSA: 2.1 m2  Heart Rate: 105 mmHg  ------------------------------------------------------------------------  PROCEDURE: Transthoracic echocardiogram with 2-D, M-Mode  and complete spectral andcolor flow Doppler.  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 No segmental  wall motion abnormalities.  Moderate aortic stenosis.  There may be a vegetation on the left or non-coronary cusp  of the aortic valve. Consider transesophageal  echocardiography.  ------------------------------------------------------------------------  Confirmed on  12/22/2021 - 10:32:40 by Rahul Correa M.D.  ------------------------------------------------------------------------  MICROBIOLOGY:     COVID-19 PCR . (01.04.22 @ 17:38)   COVID-19 PCR: NotDetec:     Culture - Bronchial (01.06.22 @ 18:48)   Culture Results:   Normal Respiratory Regina present     Culture - Blood (12.24.21 @ 17:38)   Specimen Source: .Blood Blood-Venous   Culture Results:   No Growth Final     Culture - Blood (12.24.21 @ 15:22)   Specimen Source: .Blood Blood-Peripheral   Culture Results:   No Growth Final     Culture - Abscess with Gram Stain (12.23.21 @ 18:49)   Specimen Source: .Abscess abdominal abscess   Culture Results:   Few Escherichia coli   Few Morganella morganii   Moderate Enterococcus avium   Numerous Bacteroides thetaiotamcron group "Susceptibilities not performed"   Numerous Clostridium ramosum "Susceptibilities not performed"     Culture - Blood (12.19.21 @ 14:24)   Culture Results:   Growth in aerobic bottle: Staphylococcus epidermidis   Organism Identification: Staphylococcus epidermidis     Culture - Blood (12.19.21 @ 14:24)   Gram Stain:   Growth in aerobic bottle: Gram Positive Cocci in Clusters   Growth in anaerobic bottle: Gram Positive Cocci in Clusters   - Staphylococcus epidermidis, Methicillin resistant: Detec     RADIOLOGY:  [x ] Chest radiographs reviewed and interpreted by me    EXAM:  XR CHEST PORTABLE ROUTINE 1V                        EXAM:  XR CHEST PORTABLE URGENT 1V                          PROCEDURE DATE:  01/08/2022      INTERPRETATION:  Clinical Information: Status post bronchoscopy.    Technique: 2 AP chest x-ray(s).    Comparison: 01/06/2022    Findings/  Impression: Left PICC tip at the SVC. Cardiomegaly. Improved aeration of   the left lung, although with persistent small patchy opacities.    YAMILET OWEN MD; Attending Interventional Radiologist  This document has been electronically signed. Jan 8 2022 10:27AM  --------------------------------------------------------------------------------------------------------------   EXAM:  XR CHEST PORTABLE URGENT 1V                          PROCEDURE DATE:  01/06/2022      INTERPRETATION:  EXAMINATION: XR CHEST URGENT    FINDINGS:  Left humeral hardware. Left upper extremity PICC terminates in SVC.  The heart is not accurately assessed in this projection. Mediastinum is   shifted to the left..  Whiteout of the left lung. The right lung is free of consolidation.  No pneumothorax.    IMPRESSION:  Left lung collapse, unchanged.    JUANITA WALKER M.D., RADIOLOGY RESIDENT  This document has been electronically signed.  DIANE CLARK MD; Attending Radiologist  This document has been electronically signed. Jan 6 2022  9:54PM  --------------------------------------------------------------------------------------------------------------  EXAM:  XR CHEST PORTABLE URGENT 1V                        EXAM:  XR CHEST PORTABLE URGENT 1V                          PROCEDURE DATE:  01/05/2022      INTERPRETATION:  EXAMINATION: XR CHEST URGENT, XR CHEST URGENT    CLINICAL INDICATION: Shortness of Breath, follow up    TECHNIQUE: Frontal, portable views of the chest were obtained at 1/4/2022   11:25 PM and 1/5/2022 6:49 AM    COMPARISON: Chest x-ray 12/25/2021.    FINDINGS:  Airway machine/device over left chest and neck.  Heart size cannot be assessed in this projection.  White out of the left lung with tracheal deviation towards the left,   consistent with left lung collapse. The right lung is clear.  There is no pneumothorax or pleural effusion.    IMPRESSION:  Left lung collapse.    HENRIQUE CRUZ MD; Resident Radiologist  This document has been electronically signed.  PRAVIN CASTILLO MD; Attending Radiologist  This document has been electronically signed. Jan 5 2022 10:11AM  --------------------------------------------------------------------------------------------------------------  EXAM:  CT ABDOMEN AND PELVIS IC                        EXAM:  CT CHEST IC                          PROCEDURE DATE:  12/19/2021      FINDINGS:    CHEST:  Motion degradation, particularly at the lung bases.    LUNGS AND LARGE AIRWAYS: Debris within the left lobar and more distal   airways of the left lower lobe. There is likely some debris within the   right lower lobe airways as well, though motion degradation limits   evaluation. Complete atelectasis of the left lower lobe. Subsegmental   atelectatic changes within the left upper lobe and dependent portions of   the right lung.  PLEURA: No pleural effusion. No pneumothorax.  VESSELS: Atherosclerotic changes. Coronary artery calcifications.  HEART: Heart size is normal. Aortic valve calcification. No pericardial   effusion.  MEDIASTINUM AND GIOVANA: No lymphadenopathy.  CHEST WALL AND LOWER NECK: Right mastectomy.    ABDOMEN AND PELVIS:  Motion degradation, most pronounced in the upper abdomen.    LIVER: Normal size and morphology. Subcentimeter calcified granuloma in   the right lobe. No suspicious liver lesion. Hepatic and portal veins are   patent.  BILE DUCTS: Hyperdensity in the region of the distal common bile duct   (series 3 image 130), which may reflect choledocholithiasis or adjacent   pancreatic parenchymal calcification. Bile ducts are of normal caliber,   with the common bile duct measuring up to 5 mm in diameter.  GALLBLADDER: Cholelithiasis.  SPLEEN: Within normal limits.  PANCREAS: Hypodense cystic appearing lesion measuring 1.0 cm in the   pancreatic tail (series 3 image 108), which is new since 7/2/2017, but is   unchanged since 5/28/2021. No main pancreatic duct dilation.  ADRENALS: Within normal limits.  KIDNEYS/URETERS: Within normal limits.    BLADDER: Within normal limits.  REPRODUCTIVEORGANS: Thickening of the endometrial complex at the fundus,   measuring up to 1.6 cm.    BOWEL:  Fluid collection within the right lower quadrant measuring 7.3 x 3.9 x   5.7 cm (series 3 image 209) with surrounding fatty infiltration. The   collection is associated with the tip of the appendix, which is   indistinct, with findings concerning for perforated appendicitis. No   pneumoperitoneum. There is a punctate 1 to 2 mm calcific density within   the collection (series 3 image 12), which may represent an appendicolith.    Small hiatal hernia. Percutaneous gastrostomy tube, the balloon located   within the gastric antrum. Duodenal diverticulum arising from the third   portion of the duodenum. Colonic diverticulosis. No bowel obstruction.    PERITONEUM: Right lower quadrant fluid collection, as described above.  VESSELS: Atherosclerotic changes.  RETROPERITONEUM/LYMPH NODES: No lymphadenopathy.  ABDOMINAL WALL: Small fat-containing umbilical hernia on a background of   diastases recti. Percutaneous gastrostomy tube.  BONES: Fixation hardware of the proximal left humerus. Degenerative   changes. Osseous demineralization. Mild to moderate loss of height of the   T11 and L1 vertebral bodies, unchanged since 2017.    Soft tissue density measuring 2.8 x 1.8 cm centered within the right   posterior medial acetabulum with associated erosion of the cortex (series   4 image 794). Findings are new since 7/2/2017.    IMPRESSION:  Findings concerning for perforated appendicitis with a right lower   quadrant collection associated with the appendiceal tip.    Complete atelectasis of the left lower lobe with debris occupying the   left lower lobe airways. An underlying pneumonia is not excluded.    Indeterminate lesion within the right acetabulum with associated osseous   erosion. Malignancy is not excluded.    Cholelithiasis and suspected choledocholithiasis. No biliary duct   dilation.    Thickening of the endometrial complex. Pelvic ultrasound may be performed   for further assessment.    Pancreatic tail lesion measuring 1 cm, possibly a side branch IPMN. This   can be further assessed with nonemergent contrast enhanced abdominal   MRI/MRCP.    Findings were discussed with Dr. Wade 12/20/2021 9:14 AM by Dr. Alan with readback confirmation.    NICANOR ALAN MD; Attending Radiologist  This document has been electronically signed. Dec 20 2021  9:17AM  ---------------------------------------------------------------------------------------------------------------

## 2022-01-09 NOTE — PROGRESS NOTE ADULT - ASSESSMENT
ASSESSMENT:    86 year old gentlewoman, lifelong non-smoker, without history of intrinsic lung disease. 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 She has a history of HTN, HLD, CAD s/p MI with preserved LVEF and moderate aortic stenosis. The patient was admitted on 12/19 with fever and abdominal pain. She was found to have perforated appendicitis with abscess formation. The patient has been treated with tube drainage (removed several days ago) and antibiotics for a polymicrobial infection (currently on vancomycin and ertapenem). CT scan on admission had debris within the left lobar and more distal airways of the left lower lobe as well as some debris within the right lower lobe airway - there was complete atelectasis of the left lower lobe and subsegmental atelectatic changes within the left upper lobe and dependent portions of the right lung. This was likely due to the patient's weak cough following her CVA. Overnight, the patient developed increased work of breathing, tachycardia, tachypnea and hypoxemia. The patient has been placed on BIPAP which she is finding very uncomfortable. She is currently without respiratory distress and without hypoxemia on FiO2 40%. She has no cough, sputum production, chest congestion or wheeze. No fevers, chills or sweats. No chest pain/pressure or palpitations. CXR "to my eye" reveals  complete opacification of the left hemithorax with leftward tracheal deviation c/w worsening mucous plugging and complete left lung atelectasis    PLAN/RECOMMENDATIONS:    no shortness of breath or hypoxemia on a 40% high flow nasal canula @ 40 lpm -> transitioned to a humidified nasal canula @ 6lpm   NIV for sleep to facilitate lung reexpansion  s/p bronchoscopy with removal of copious amounts of purulent mucous from throughout the airways - there was severe tracheobronchomalacia -> cultures are negative  following CXR -. improved aeration of the left lung  continue albuterol/atrovent nebs q6h  continue hypertonic saline and mucomyst nebs to facilitate mucous clearance after bronchoscopy  continue chest vest therapy   continue aggressive manual chest PT  continue vancomycin x 6 weeks for methicillin resistant Staphylococcus epidermidis bacteremia - ertapenem - ID follow-up noted   cardiac meds: lopressor/lipitor  DVT prophylaxis - SQ heparin  allopurinol/keppra/synthroid  glucose control    Will follow with you. Plan of care discussed with the patient at bedside and with Dr. Sanya Marcano MD, [facility]  [phone]  Pulmonary Medicine

## 2022-01-09 NOTE — PROGRESS NOTE ADULT - SUBJECTIVE AND OBJECTIVE BOX
afberile  REVIEW OF SYSTEMS:  GEN: no fever,    no chills  RESP: no SOB,   no cough  CVS: no chest pain,   no palpitations  GI: no abdominal pain,   no nausea,   no vomiting,   no constipation,   no diarrhea  : no dysuria,   no frequency  NEURO: no headache,   no dizziness  PSYCH: no depression,   not anxious  Derm : no rash    MEDICATIONS  (STANDING):  acetylcysteine 20%  Inhalation 4 milliLiter(s) Inhalation three times a day  albuterol/ipratropium for Nebulization 3 milliLiter(s) Nebulizer every 6 hours  allopurinol 100 milliGRAM(s) Oral daily  artificial  tears Solution 1 Drop(s) Both EYES two times a day  artificial tears (preservative free) Ophthalmic Solution 1 Drop(s) Both EYES four times a day  atorvastatin 20 milliGRAM(s) Oral at bedtime  chlorhexidine 4% Liquid 1 Application(s) Topical <User Schedule>  dextrose 40% Gel 15 Gram(s) Oral once  dextrose 5% + sodium chloride 0.9%. 1000 milliLiter(s) (50 mL/Hr) IV Continuous <Continuous>  dextrose 5%. 1000 milliLiter(s) (50 mL/Hr) IV Continuous <Continuous>  dextrose 5%. 1000 milliLiter(s) (100 mL/Hr) IV Continuous <Continuous>  dextrose 50% Injectable 25 Gram(s) IV Push once  dextrose 50% Injectable 12.5 Gram(s) IV Push once  dextrose 50% Injectable 25 Gram(s) IV Push once  ertapenem  IVPB 1000 milliGRAM(s) IV Intermittent every 24 hours  famotidine    Tablet 20 milliGRAM(s) Oral once  glucagon  Injectable 1 milliGRAM(s) IntraMuscular once  heparin   Injectable 5000 Unit(s) SubCutaneous every 8 hours  insulin lispro (ADMELOG) corrective regimen sliding scale   SubCutaneous every 6 hours  levETIRAcetam  Solution 750 milliGRAM(s) Oral two times a day  levothyroxine 75 MICROGram(s) Oral daily  metoprolol tartrate 50 milliGRAM(s) Oral two times a day  nystatin Cream 1 Application(s) Topical two times a day  sodium chloride 3%  Inhalation 4 milliLiter(s) Inhalation every 6 hours  vancomycin  IVPB 1000 milliGRAM(s) IV Intermittent every 24 hours    MEDICATIONS  (PRN):  acetaminophen     Tablet .. 650 milliGRAM(s) Oral every 6 hours PRN Temp greater or equal to 38C (100.4F), Mild Pain (1 - 3)  melatonin 3 milliGRAM(s) Oral at bedtime PRN Insomnia  sodium chloride 0.9% lock flush 10 milliLiter(s) IV Push every 1 hour PRN Pre/post blood products, medications, blood draw, and to maintain line patency      Vital Signs Last 24 Hrs  T(C): 36.8 (09 Jan 2022 05:17), Max: 36.8 (08 Jan 2022 16:33)  T(F): 98.3 (09 Jan 2022 05:17), Max: 98.3 (09 Jan 2022 05:17)  HR: 98 (09 Jan 2022 05:17) (83 - 104)  BP: 120/46 (09 Jan 2022 05:17) (104/54 - 120/46)  BP(mean): --  RR: 20 (09 Jan 2022 05:17) (18 - 20)  SpO2: 95% (09 Jan 2022 05:17) (94% - 98%)  CAPILLARY BLOOD GLUCOSE      POCT Blood Glucose.: 119 mg/dL (08 Jan 2022 23:55)  POCT Blood Glucose.: 164 mg/dL (08 Jan 2022 17:04)  POCT Blood Glucose.: 142 mg/dL (08 Jan 2022 11:42)  POCT Blood Glucose.: 180 mg/dL (08 Jan 2022 06:22)    I&O's Summary    07 Jan 2022 07:01  -  08 Jan 2022 07:00  --------------------------------------------------------  IN: 1380 mL / OUT: 500 mL / NET: 880 mL    08 Jan 2022 07:01  -  09 Jan 2022 06:10  --------------------------------------------------------  IN: 0 mL / OUT: 700 mL / NET: -700 mL        PHYSICAL EXAM:  HEAD:  Atraumatic, Normocephalic  NECK: Supple, No   JVD  CHEST/LUNG:   no     rales,     no,    rhonchi  HEART: Regular rate and rhythm;         murmur  ABDOMEN: Soft, Nontender, ;   EXTREMITIES:     no   edema  NEUROLOGY:  alert    LABS:                        8.8    5.34  )-----------( 278      ( 07 Jan 2022 08:04 )             29.6     01-07    142  |  105  |  8   ----------------------------<  140<H>  4.3   |  24  |  0.55    Ca    9.4      07 Jan 2022 08:04                      Thyroid Stimulating Hormone, Serum: 2.39 uIU/mL (01-07 @ 09:13)          Consultant(s) Notes Reviewed:      Care Discussed with Consultants/Other Providers:

## 2022-01-10 NOTE — PROGRESS NOTE ADULT - SUBJECTIVE AND OBJECTIVE BOX
afberile  REVIEW OF SYSTEMS:  GEN: no fever,    no chills  RESP: no SOB,   no cough  CVS: no chest pain,   no palpitations  GI: no abdominal pain,   no nausea,   no vomiting,   no constipation,   no diarrhea  : no dysuria,   no frequency  NEURO: no headache,   no dizziness  PSYCH: no depression,   not anxious  Derm : no rash    MEDICATIONS  (STANDING):  acetylcysteine 20%  Inhalation 4 milliLiter(s) Inhalation three times a day  albuterol/ipratropium for Nebulization 3 milliLiter(s) Nebulizer every 6 hours  allopurinol 100 milliGRAM(s) Oral daily  artificial  tears Solution 1 Drop(s) Both EYES two times a day  artificial tears (preservative free) Ophthalmic Solution 1 Drop(s) Both EYES four times a day  atorvastatin 20 milliGRAM(s) Oral at bedtime  chlorhexidine 4% Liquid 1 Application(s) Topical <User Schedule>  dextrose 40% Gel 15 Gram(s) Oral once  dextrose 5% + sodium chloride 0.9%. 1000 milliLiter(s) (50 mL/Hr) IV Continuous <Continuous>  dextrose 5%. 1000 milliLiter(s) (50 mL/Hr) IV Continuous <Continuous>  dextrose 5%. 1000 milliLiter(s) (100 mL/Hr) IV Continuous <Continuous>  dextrose 50% Injectable 25 Gram(s) IV Push once  dextrose 50% Injectable 12.5 Gram(s) IV Push once  dextrose 50% Injectable 25 Gram(s) IV Push once  ertapenem  IVPB 1000 milliGRAM(s) IV Intermittent every 24 hours  glucagon  Injectable 1 milliGRAM(s) IntraMuscular once  heparin   Injectable 5000 Unit(s) SubCutaneous every 8 hours  insulin lispro (ADMELOG) corrective regimen sliding scale   SubCutaneous every 6 hours  levETIRAcetam  Solution 750 milliGRAM(s) Oral two times a day  levothyroxine 75 MICROGram(s) Oral daily  metoprolol tartrate 50 milliGRAM(s) Oral two times a day  nystatin Cream 1 Application(s) Topical two times a day  sodium chloride 3%  Inhalation 4 milliLiter(s) Inhalation every 6 hours  vancomycin  IVPB 1000 milliGRAM(s) IV Intermittent every 24 hours    MEDICATIONS  (PRN):  acetaminophen     Tablet .. 650 milliGRAM(s) Oral every 6 hours PRN Temp greater or equal to 38C (100.4F), Mild Pain (1 - 3)  melatonin 3 milliGRAM(s) Oral at bedtime PRN Insomnia  sodium chloride 0.9% lock flush 10 milliLiter(s) IV Push every 1 hour PRN Pre/post blood products, medications, blood draw, and to maintain line patency      Vital Signs Last 24 Hrs  T(C): 36.9 (10 Js 2022 04:23), Max: 37.1 (09 Jan 2022 09:17)  T(F): 98.5 (10 Js 2022 04:23), Max: 98.7 (09 Jan 2022 09:17)  HR: 88 (10 Js 2022 05:30) (58 - 99)  BP: 116/54 (10 Js 2022 04:23) (101/73 - 136/74)  BP(mean): --  RR: 18 (10 Js 2022 04:23) (18 - 20)  SpO2: 94% (10 Js 2022 05:30) (93% - 98%)  CAPILLARY BLOOD GLUCOSE      POCT Blood Glucose.: 179 mg/dL (10 Js 2022 05:27)  POCT Blood Glucose.: 202 mg/dL (10 Js 2022 00:31)  POCT Blood Glucose.: 153 mg/dL (09 Jan 2022 17:54)  POCT Blood Glucose.: 145 mg/dL (09 Jan 2022 12:05)    I&O's Summary    08 Jan 2022 07:01  -  09 Jan 2022 07:00  --------------------------------------------------------  IN: 1130 mL / OUT: 700 mL / NET: 430 mL    09 Jan 2022 07:01  -  10 Js 2022 06:55  --------------------------------------------------------  IN: 0 mL / OUT: 950 mL / NET: -950 mL        PHYSICAL EXAM:  HEAD:  Atraumatic, Normocephalic  NECK: Supple, No   JVD  CHEST/LUNG:   no     rales,     no,    rhonchi  HEART: Regular rate and rhythm;         murmur  ABDOMEN: Soft, Nontender, ;   EXTREMITIES:     no   edema  NEUROLOGY:  alert    LABS:                          Thyroid Stimulating Hormone, Serum: 2.39 uIU/mL (01-07 @ 09:13)          Consultant(s) Notes Reviewed:      Care Discussed with Consultants/Other Providers:

## 2022-01-10 NOTE — PROGRESS NOTE ADULT - SUBJECTIVE AND OBJECTIVE BOX
[facility] - PROGRESS NOTE    CHIEF COMPLAINT: acute hypoxic respiratory failure; mucous plugging; left lung atelectasis; weak cough; dysphagia; h/o CVA; perforated appendicitis with abscess formation    INTERVAL HISTORY: s/p bronchoscopy with removal of copious amounts of purulent secretions from throughout the bronchial tree - there was severe tracheobronchomalacia preventing mucous removal; CXR yesterday with continued improvement of aeration in the left lung; no shortness of breath or hypoxemia on a 3lpm nasal canula; occasional weak cough productive of scant sputum; no chest congestion or wheeze; no fevers, chills or sweats; no chest pain/pressure or palpitations; bedbound; enormously tired but easily arousable    REVIEW OF SYSTEMS:  Constitutional: As per interval history  HEENT: Within normal limits  CV: As per interval history  Resp: As per interval history  GI: dysphagia -> PEG   : Within normal limits  Musculoskeletal: Within normal limits  Skin: Within normal limits  Neurological: CVA -> left sided weakness  Psychiatric: Within normal limits  Endocrine: Within normal limits  Hematologic/Lymphatic: Within normal limits  Allergic/Immunologic: Within normal limits    MEDICATIONS:     Pulmonary "  acetylcysteine 20%  Inhalation 4 milliLiter(s) Inhalation three times a day  albuterol/ipratropium for Nebulization 3 milliLiter(s) Nebulizer every 6 hours  sodium chloride 3%  Inhalation 4 milliLiter(s) Inhalation every 6 hours    Anti-microbials:  ertapenem  IVPB 1000 milliGRAM(s) IV Intermittent every 24 hours  vancomycin  IVPB 1000 milliGRAM(s) IV Intermittent every 24 hours    Cardiovascular:  metoprolol tartrate 50 milliGRAM(s) Oral two times a day    Other:  allopurinol 100 milliGRAM(s) Oral daily  artificial  tears Solution 1 Drop(s) Both EYES two times a day  artificial tears (preservative free) Ophthalmic Solution 1 Drop(s) Both EYES four times a day  atorvastatin 20 milliGRAM(s) Oral at bedtime  chlorhexidine 4% Liquid 1 Application(s) Topical <User Schedule>  dextrose 40% Gel 15 Gram(s) Oral once  dextrose 5% + sodium chloride 0.9%. 1000 milliLiter(s) IV Continuous <Continuous>  dextrose 5%. 1000 milliLiter(s) IV Continuous <Continuous>  dextrose 5%. 1000 milliLiter(s) IV Continuous <Continuous>  dextrose 50% Injectable 25 Gram(s) IV Push once  dextrose 50% Injectable 12.5 Gram(s) IV Push once  dextrose 50% Injectable 25 Gram(s) IV Push once  glucagon  Injectable 1 milliGRAM(s) IntraMuscular once  heparin   Injectable 5000 Unit(s) SubCutaneous every 8 hours  insulin lispro (ADMELOG) corrective regimen sliding scale   SubCutaneous every 6 hours  levETIRAcetam  Solution 750 milliGRAM(s) Oral two times a day  levothyroxine 75 MICROGram(s) Oral daily  nystatin Cream 1 Application(s) Topical two times a day    MEDICATIONS  (PRN):  acetaminophen     Tablet .. 650 milliGRAM(s) Oral every 6 hours PRN Temp greater or equal to 38C (100.4F), Mild Pain (1 - 3)  melatonin 3 milliGRAM(s) Oral at bedtime PRN Insomnia  sodium chloride 0.9% lock flush 10 milliLiter(s) IV Push every 1 hour PRN Pre/post blood products, medications, blood draw, and to maintain line patency    OBJECTIVE:    I&O's Detail    09 Jan 2022 07:01  -  10 Js 2022 07:00  --------------------------------------------------------  IN:    Glucerna: 540 mL  Total IN: 540 mL    OUT:    Voided (mL): 950 mL  Total OUT: 950 mL    Total NET: -410 mL    POCT Blood Glucose.: 179 mg/dL (10 Js 2022 05:27)  POCT Blood Glucose.: 202 mg/dL (10 Js 2022 00:31)  POCT Blood Glucose.: 153 mg/dL (09 Jan 2022 17:54)  POCT Blood Glucose.: 145 mg/dL (09 Jan 2022 12:05)      PHYSICAL EXAM:       ICU Vital Signs Last 24 Hrs  T(C): 36.9 (10 Js 2022 04:23), Max: 37.1 (09 Jan 2022 09:17)  T(F): 98.5 (10 Js 2022 04:23), Max: 98.7 (09 Jan 2022 09:17)  HR: 88 (10 Js 2022 05:30) (58 - 99)  BP: 116/54 (10 Js 2022 04:23) (101/73 - 136/74)  BP(mean): --  ABP: --  ABP(mean): --  RR: 18 (10 Js 2022 04:23) (18 - 20)  SpO2: 94% (10 Js 2022 05:30) (93% - 98%) on 3lpm nasal canula     General: Awake. Alert. Cooperative. No distress. Appears stated age. Tired  HEENT: Atraumatic. Normocephalic. Anicteric. Normal oral mucosa. PERRL. EOMI.  Neck: Supple. Trachea midline. Thyroid without enlargement/tenderness/nodules. No carotid bruit. No JVD. Short and wide  Cardiovascular: Regular rate and rhythm. S1 S2 normal. III/VI systolic murmur  Respiratory: Respirations unlabored. Decreased breath sounds left hemithorax. No curvature.  Abdomen: Soft. Non-tender. Non-distended. No organomegaly. No masses. Normal bowel sounds. Obese, PEG.  Extremities: Warm to touch. No clubbing or cyanosis. No pedal edema.   Pulses: 2+ peripheral pulses all extremities.	  Skin: Normal skin color. No rashes or lesions. No ecchymoses. No cyanosis. Warm to touch.  Lymph Nodes: Cervical, supraclavicular and axillary nodes normal  Neurological: Left lower extremity plegia with contraction. Left upper extremity is quite weak. A and O x 3  Psychiatry: Appropriate mood and affect.    LABS:      CBC    WBC  5.34 <==, 5.06 <==, 2.70 <==, 5.05 <==, 6.78 <==, 5.38 <==    Hemoglobin  8.8 <<==, 8.6 <<==, 4.3 <<==, 8.7 <<==, 9.3 <<==, 9.3 <<==    Hematocrit  29.6 <==, 29.1 <==, 15.8 <==, 29.3 <==, 30.6 <==, 30.7 <==    Platelets  278 <==, 272 <==, 129 <==, 277 <==, 298 <==, 266 <==      142  |  105  |  8   ----------------------------<  140<H>    01-07  4.3   |  24  |  0.55      LYTES    sodium  142 <==, 140 <==, 144 <==, 139 <==    potassium   4.3 <==, 4.2 <==, 4.2 <==, 4.0 <==    chloride  105 <==, 105 <==, 107 <==, 105 <==    carbon dioxide  24 <==, 22 <==, 18 <==, 17 <==    =============================================================================================  RENAL FUNCTION:    Creatinine:   0.55  <<==, 0.56  <<==, 0.62  <<==, 0.62  <<==    BUN:   8 <==, 8 <==, 10 <==, 10 <==    ============================================================================================    calcium   9.4 <==, 9.5 <==, 9.3 <==, 9.4 <==    phos   4.1 <==    mag   1.9 <==    ============================================================================================  LFTs    AST:   17 <== , 14 <== , 14 <==     ALT:  20  <== , 22  <== , 26  <==     AP:  86  <=, 82  <=, 88  <=    Bili:  0.3  <=, 0.2  <=, 0.2  <=    Venous Blood Gas:  01-05 @ 06:53  7.32/46/45/24/73.0  VBG Lactate: 2.3    ABG - ( 05 Jan 2022 00:27 )  pH: 7.39  /  pCO2: 34    /  pO2: 75    / HCO3: 21    / Base Excess: -3.8  /  SaO2: 96.7      < from: Transthoracic Echocardiogram (12.21.21 @ 14:39) >    Patient name: FRANKI MEHTA  YOB: 1935   Age: 86 (F)   MR#: [id number]  Study Date: 12/21/2021  Location: [address] [place], [place] [id number]: Tena Garnett RDCS  Study quality: Technically difficult/Limited  Referring Physician: Edmond Almazan MD  Blood Pressure: 134/77 mmHg  Height: 163 cm  Weight: 109 kg  BSA: 2.1 m2  Heart Rate: 105 mmHg  ------------------------------------------------------------------------  PROCEDURE: Transthoracic echocardiogram with 2-D, M-Mode  and complete spectral andcolor flow Doppler.  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 No segmental  wall motion abnormalities.  Moderate aortic stenosis.  There may be a vegetation on the left or non-coronary cusp  of the aortic valve. Consider transesophageal  echocardiography.  ------------------------------------------------------------------------  Confirmed on  12/22/2021 - 10:32:40 by Rahul Correa M.D.  ------------------------------------------------------------------------  MICROBIOLOGY:     COVID-19 PCR . (01.04.22 @ 17:38)   COVID-19 PCR: NotDetec:     Culture - Bronchial (01.06.22 @ 18:48)   Culture Results:   Normal Respiratory Regina present     Culture - Blood (12.24.21 @ 17:38)   Specimen Source: .Blood Blood-Venous   Culture Results:   No Growth Final     Culture - Blood (12.24.21 @ 15:22)   Specimen Source: .Blood Blood-Peripheral   Culture Results:   No Growth Final     Culture - Abscess with Gram Stain (12.23.21 @ 18:49)   Specimen Source: .Abscess abdominal abscess   Culture Results:   Few Escherichia coli   Few Morganella morganii   Moderate Enterococcus avium   Numerous Bacteroides thetaiotamcron group "Susceptibilities not performed"   Numerous Clostridium ramosum "Susceptibilities not performed"     Culture - Blood (12.19.21 @ 14:24)   Culture Results:   Growth in aerobic bottle: Staphylococcus epidermidis   Organism Identification: Staphylococcus epidermidis     Culture - Blood (12.19.21 @ 14:24)   Gram Stain:   Growth in aerobic bottle: Gram Positive Cocci in Clusters   Growth in anaerobic bottle: Gram Positive Cocci in Clusters   - Staphylococcus epidermidis, Methicillin resistant: Detec     RADIOLOGY:  [x ] Chest radiographs reviewed and interpreted by me    EXAM:  XR CHEST PORTABLE ROUTINE 1V                        EXAM:  XR CHEST PORTABLE URGENT 1V                          PROCEDURE DATE:  01/08/2022      INTERPRETATION:  Clinical Information: Status post bronchoscopy.    Technique: 2 AP chest x-ray(s).    Comparison: 01/06/2022    Findings/  Impression: Left PICC tip at the SVC. Cardiomegaly. Improved aeration of   the left lung, although with persistent small patchy opacities.    YAMILET OWEN MD; Attending Interventional Radiologist  This document has been electronically signed. Jan 8 2022 10:27AM  --------------------------------------------------------------------------------------------------------------   EXAM:  XR CHEST PORTABLE URGENT 1V                          PROCEDURE DATE:  01/06/2022      INTERPRETATION:  EXAMINATION: XR CHEST URGENT    FINDINGS:  Left humeral hardware. Left upper extremity PICC terminates in SVC.  The heart is not accurately assessed in this projection. Mediastinum is   shifted to the left..  Whiteout of the left lung. The right lung is free of consolidation.  No pneumothorax.    IMPRESSION:  Left lung collapse, unchanged.    JUANITA WALKER M.D., RADIOLOGY RESIDENT  This document has been electronically signed.  DIANE CLARK MD; Attending Radiologist  This document has been electronically signed. Jan 6 2022  9:54PM  --------------------------------------------------------------------------------------------------------------  EXAM:  XR CHEST PORTABLE URGENT 1V                        EXAM:  XR CHEST PORTABLE URGENT 1V                          PROCEDURE DATE:  01/05/2022      INTERPRETATION:  EXAMINATION: XR CHEST URGENT, XR CHEST URGENT    CLINICAL INDICATION: Shortness of Breath, follow up    TECHNIQUE: Frontal, portable views of the chest were obtained at 1/4/2022   11:25 PM and 1/5/2022 6:49 AM    COMPARISON: Chest x-ray 12/25/2021.    FINDINGS:  Airway machine/device over left chest and neck.  Heart size cannot be assessed in this projection.  White out of the left lung with tracheal deviation towards the left,   consistent with left lung collapse. The right lung is clear.  There is no pneumothorax or pleural effusion.    IMPRESSION:  Left lung collapse.    HENRIQUE CRUZ MD; Resident Radiologist  This document has been electronically signed.  PRAVIN CASTILLO MD; Attending Radiologist  This document has been electronically signed. Jan 5 2022 10:11AM  --------------------------------------------------------------------------------------------------------------  EXAM:  CT ABDOMEN AND PELVIS IC                        EXAM:  CT CHEST IC                          PROCEDURE DATE:  12/19/2021      FINDINGS:    CHEST:  Motion degradation, particularly at the lung bases.    LUNGS AND LARGE AIRWAYS: Debris within the left lobar and more distal   airways of the left lower lobe. There is likely some debris within the   right lower lobe airways as well, though motion degradation limits   evaluation. Complete atelectasis of the left lower lobe. Subsegmental   atelectatic changes within the left upper lobe and dependent portions of   the right lung.  PLEURA: No pleural effusion. No pneumothorax.  VESSELS: Atherosclerotic changes. Coronary artery calcifications.  HEART: Heart size is normal. Aortic valve calcification. No pericardial   effusion.  MEDIASTINUM AND GIOVANA: No lymphadenopathy.  CHEST WALL AND LOWER NECK: Right mastectomy.    ABDOMEN AND PELVIS:  Motion degradation, most pronounced in the upper abdomen.    LIVER: Normal size and morphology. Subcentimeter calcified granuloma in   the right lobe. No suspicious liver lesion. Hepatic and portal veins are   patent.  BILE DUCTS: Hyperdensity in the region of the distal common bile duct   (series 3 image 130), which may reflect choledocholithiasis or adjacent   pancreatic parenchymal calcification. Bile ducts are of normal caliber,   with the common bile duct measuring up to 5 mm in diameter.  GALLBLADDER: Cholelithiasis.  SPLEEN: Within normal limits.  PANCREAS: Hypodense cystic appearing lesion measuring 1.0 cm in the   pancreatic tail (series 3 image 108), which is new since 7/2/2017, but is   unchanged since 5/28/2021. No main pancreatic duct dilation.  ADRENALS: Within normal limits.  KIDNEYS/URETERS: Within normal limits.    BLADDER: Within normal limits.  REPRODUCTIVEORGANS: Thickening of the endometrial complex at the fundus,   measuring up to 1.6 cm.    BOWEL:  Fluid collection within the right lower quadrant measuring 7.3 x 3.9 x   5.7 cm (series 3 image 209) with surrounding fatty infiltration. The   collection is associated with the tip of the appendix, which is   indistinct, with findings concerning for perforated appendicitis. No   pneumoperitoneum. There is a punctate 1 to 2 mm calcific density within   the collection (series 3 image 12), which may represent an appendicolith.    Small hiatal hernia. Percutaneous gastrostomy tube, the balloon located   within the gastric antrum. Duodenal diverticulum arising from the third   portion of the duodenum. Colonic diverticulosis. No bowel obstruction.    PERITONEUM: Right lower quadrant fluid collection, as described above.  VESSELS: Atherosclerotic changes.  RETROPERITONEUM/LYMPH NODES: No lymphadenopathy.  ABDOMINAL WALL: Small fat-containing umbilical hernia on a background of   diastases recti. Percutaneous gastrostomy tube.  BONES: Fixation hardware of the proximal left humerus. Degenerative   changes. Osseous demineralization. Mild to moderate loss of height of the   T11 and L1 vertebral bodies, unchanged since 2017.    Soft tissue density measuring 2.8 x 1.8 cm centered within the right   posterior medial acetabulum with associated erosion of the cortex (series   4 image 794). Findings are new since 7/2/2017.    IMPRESSION:  Findings concerning for perforated appendicitis with a right lower   quadrant collection associated with the appendiceal tip.    Complete atelectasis of the left lower lobe with debris occupying the   left lower lobe airways. An underlying pneumonia is not excluded.    Indeterminate lesion within the right acetabulum with associated osseous   erosion. Malignancy is not excluded.    Cholelithiasis and suspected choledocholithiasis. No biliary duct   dilation.    Thickening of the endometrial complex. Pelvic ultrasound may be performed   for further assessment.    Pancreatic tail lesion measuring 1 cm, possibly a side branch IPMN. This   can be further assessed with nonemergent contrast enhanced abdominal   MRI/MRCP.    Findings were discussed with Dr. Wade 12/20/2021 9:14 AM by Dr. Alan with readback confirmation.    NICANOR ALAN MD; Attending Radiologist  This document has been electronically signed. Dec 20 2021  9:17AM  ---------------------------------------------------------------------------------------------------------------

## 2022-01-10 NOTE — PROGRESS NOTE ADULT - ASSESSMENT
ASSESSMENT:    86 year old gentlewoman, lifelong non-smoker, without history of intrinsic lung disease. 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 She has a history of HTN, HLD, CAD s/p MI with preserved LVEF and moderate aortic stenosis. The patient was admitted on 12/19 with fever and abdominal pain. She was found to have perforated appendicitis with abscess formation. The patient has been treated with tube drainage (removed several days ago) and antibiotics for a polymicrobial infection (currently on vancomycin and ertapenem). CT scan on admission had debris within the left lobar and more distal airways of the left lower lobe as well as some debris within the right lower lobe airway - there was complete atelectasis of the left lower lobe and subsegmental atelectatic changes within the left upper lobe and dependent portions of the right lung. This was likely due to the patient's weak cough following her CVA. Overnight, the patient developed increased work of breathing, tachycardia, tachypnea and hypoxemia. The patient has been placed on BIPAP which she is finding very uncomfortable. She is currently without respiratory distress and without hypoxemia on FiO2 40%. She has no cough, sputum production, chest congestion or wheeze. No fevers, chills or sweats. No chest pain/pressure or palpitations. CXR "to my eye" reveals  complete opacification of the left hemithorax with leftward tracheal deviation c/w worsening mucous plugging and complete left lung atelectasis    1/10 - improving oxygenation with ongoing reexpansion of the left lung following bronchoscopy    PLAN/RECOMMENDATIONS:    no shortness of breath or hypoxemia on a humidified nasal canula @ 3lpm -> decreased to 2lpm  NIV for sleep to facilitate lung reexpansion  s/p bronchoscopy with removal of copious amounts of purulent mucous from throughout the airways - there was severe tracheobronchomalacia -> cultures are negative  following CXR -> improved aeration of the left lung -> await film from today  continue albuterol/atrovent nebs q6h  continue hypertonic saline and mucomyst nebs to facilitate mucous clearance   continue chest vest therapy   continue aggressive manual chest PT  continue vancomycin x 6 weeks for methicillin resistant Staphylococcus epidermidis bacteremia - ertapenem - ID follow-up   cardiac meds: lopressor/lipitor  DVT prophylaxis - SQ heparin  allopurinol/keppra/synthroid  glucose control    Will follow with you. Plan of care discussed with the patient at bedside and with Dr. Segundo. Discharge planning.    Kennedy Marcano MD, [facility]  [phone]  Pulmonary Medicine

## 2022-01-10 NOTE — PROGRESS NOTE ADULT - ASSESSMENT
86   year old female      h/o hemorrhagic CVA, has  PEG,  HTN, MI,      HLD, gout   right Ca breast. s/p mastectomy in 2019,  s/p  sentinel node  localization.  4/4,  were  negative         *  p/w SOB and  acute respiratory distress,  was  on   bipap  in er   with  elevated wbc of 17,000 on arrival,  cxr,? pl effusion, right  lung opacit   *   s/p cva, has  PEG,  npo/,  on  synthroid, Keppra  ,  *  HTN, on meds  per  card  prior  echo,  normal ef  *  h/o ca breast. /, s/p  R  mastectomy  * obesity, bmi is  41  *  bacteremia. / staph epi, meth R  ct  c/a/p, ?  pna, perforated  appendicitis,  ?  mets  to  acetabulum   surg/ IR  and  oncology eval dr pacheco  ,   per  surg, no  intervention  *   sepsis  on  arrival, is  resolving  from  perforated  appendix/ and  Staph epi  bacteremia which is  persistent,  hence  cannot  dismiss  it as  a contaminant    and  also, with  echo, vegetation on  aortic  valve/ endocarditis,    ?  esdras,  may  not  change   rx  plan,  will need  extended  course  of  ab  , per  card   rpt ct   RLL  phlegmon,  and  80  cc of  pus  drained by  IR . c/s  with  ecoli, e coccus  and  morganell  per card , pt high risk for esdras  and given that . this will not alter rx. pt  will need   extended  course  of ab     on iv  vanco and ertapenem  peg dislodged.  IR   replacements  on 1/3   picc  and iv ab  for 6 weeks, per  ID  s/p rrt   for hypoxia. cxr with complete  collapsed  of  left lung/  pulm d r mir   s/ p  bronchoscopy  /   pt  with  tracheomalacia  and  collapsed  airways,  makes  this a  difficult  situation, as there is no good rx for this  cxr  with omprovement  may  start   d/c planning/ optimized   per   pulm         goc/ pt is a full code. per  family         rd< from: Xray Chest 1 View- PORTABLE-Urgent (12.19.21 @ 09:20) >  IMPRESSION:  Low lung volumes. New small left lower lung hazy opacity may represent   atelectasis versus pleural effusion. Redemonstration of right upper lung   perihilar opacity largely unchanged prior exam.  --- End of Report --  < end of copied text >

## 2022-01-10 NOTE — PROGRESS NOTE ADULT - SUBJECTIVE AND OBJECTIVE BOX
CARDIOLOGY     PROGRESS  NOTE   ________________________________________________    CHIEF COMPLAINT:Patient is a 86y old  Female who presents with a chief complaint of fevers/sob (10 Js 2022 06:55)  no complain.  	  REVIEW OF SYSTEMS:  CONSTITUTIONAL: No fever, weight loss, or fatigue  EYES: No eye pain, visual disturbances, or discharge  ENT:  No difficulty hearing, tinnitus, vertigo; No sinus or throat pain  NECK: No pain or stiffness  RESPIRATORY: No cough, wheezing, chills or hemoptysis; No Shortness of Breath  CARDIOVASCULAR: No chest pain, palpitations, passing out, dizziness, or leg swelling  GASTROINTESTINAL: No abdominal or epigastric pain. No nausea, vomiting, or hematemesis; No diarrhea or constipation. No melena or hematochezia.  GENITOURINARY: No dysuria, frequency, hematuria, or incontinence  NEUROLOGICAL: No headaches, memory loss, loss of strength, numbness, or tremors  SKIN: No itching, burning, rashes, or lesions   LYMPH Nodes: No enlarged glands  ENDOCRINE: No heat or cold intolerance; No hair loss  MUSCULOSKELETAL: No joint pain or swelling; No muscle, back, or extremity pain  PSYCHIATRIC: No depression, anxiety, mood swings, or difficulty sleeping  HEME/LYMPH: No easy bruising, or bleeding gums  ALLERGY AND IMMUNOLOGIC: No hives or eczema	    [ ] All others negative	  [x ] Unable to obtain    PHYSICAL EXAM:  T(C): 36.9 (01-10-22 @ 04:23), Max: 36.9 (01-10-22 @ 01:23)  HR: 79 (01-10-22 @ 09:57) (58 - 99)  BP: 116/54 (01-10-22 @ 04:23) (101/73 - 136/74)  RR: 18 (01-10-22 @ 04:23) (18 - 20)  SpO2: 95% (01-10-22 @ 09:57) (93% - 98%)  Wt(kg): --  I&O's Summary    09 Jan 2022 07:01  -  10 Js 2022 07:00  --------------------------------------------------------  IN: 540 mL / OUT: 950 mL / NET: -410 mL        Appearance: Normal	  HEENT:   Normal oral mucosa, PERRL, EOMI	  Lymphatic: No lymphadenopathy  Cardiovascular: Normal S1 S2, No JVD, + murmurs, No edema  Respiratory: Lungs clear to auscultation	  Psychiatry: A & O x 3, Mood & affect appropriate  Gastrointestinal:  Soft, Non-tender, + BS	  Skin: No rashes, No ecchymoses, No cyanosis	  Neurologic: Non-focal  Extremities: Normal range of motion, No clubbing, cyanosis or edema  Vascular: Peripheral pulses palpable 2+ bilaterally    MEDICATIONS  (STANDING):  acetylcysteine 20%  Inhalation 4 milliLiter(s) Inhalation three times a day  albuterol/ipratropium for Nebulization 3 milliLiter(s) Nebulizer every 6 hours  allopurinol 100 milliGRAM(s) Oral daily  artificial  tears Solution 1 Drop(s) Both EYES two times a day  artificial tears (preservative free) Ophthalmic Solution 1 Drop(s) Both EYES four times a day  atorvastatin 20 milliGRAM(s) Oral at bedtime  chlorhexidine 4% Liquid 1 Application(s) Topical <User Schedule>  dextrose 40% Gel 15 Gram(s) Oral once  dextrose 5% + sodium chloride 0.9%. 1000 milliLiter(s) (50 mL/Hr) IV Continuous <Continuous>  dextrose 5%. 1000 milliLiter(s) (50 mL/Hr) IV Continuous <Continuous>  dextrose 5%. 1000 milliLiter(s) (100 mL/Hr) IV Continuous <Continuous>  dextrose 50% Injectable 25 Gram(s) IV Push once  dextrose 50% Injectable 12.5 Gram(s) IV Push once  dextrose 50% Injectable 25 Gram(s) IV Push once  ertapenem  IVPB 1000 milliGRAM(s) IV Intermittent every 24 hours  glucagon  Injectable 1 milliGRAM(s) IntraMuscular once  heparin   Injectable 5000 Unit(s) SubCutaneous every 8 hours  insulin lispro (ADMELOG) corrective regimen sliding scale   SubCutaneous every 6 hours  levETIRAcetam  Solution 750 milliGRAM(s) Oral two times a day  levothyroxine 75 MICROGram(s) Oral daily  metoprolol tartrate 50 milliGRAM(s) Oral two times a day  nystatin Cream 1 Application(s) Topical two times a day  sodium chloride 3%  Inhalation 4 milliLiter(s) Inhalation every 6 hours  vancomycin  IVPB 1000 milliGRAM(s) IV Intermittent every 24 hours      TELEMETRY: 	    ECG:  	  RADIOLOGY:  OTHER: 	  	  LABS:	 	    CARDIAC MARKERS:                  proBNP: Serum Pro-Brain Natriuretic Peptide: 375 pg/mL (12-19 @ 08:45)    Lipid Profile:   HgA1c:   TSH: Thyroid Stimulating Hormone, Serum: 2.39 uIU/mL (01-07 @ 09:13)    < from: Xray Chest 1 View- PORTABLE-Urgent (Xray Chest 1 View- PORTABLE-Urgent .) (01.09.22 @ 09:59) >  FINDINGS/  IMPRESSION:  Left PICC line tip overlies SVC.  Stable cardiomegaly.  Right lung is clear.  Redemonstration of left lung. Persistent residual patchiness in lower   left lung.  No sizable pleural effusion. No pneumothorax.    COVID-19 PCR . (01.04.22 @ 17:38)    COVID-19 PCR: NotDetec: You can help in the fight against COVID-19. [facility] may contact  you to see if you are interested in voluntarily participating in one of  our clinical trials.  Testing is performed using polymerase chain reaction (PCR) or  transcription mediated amplification (TMA). This COVID-19 (SARS-CoV-2)  nucleic acid amplification test was validated by [facility] and is  in use under the FDA Emergency Use Authorization (EUA) for clinical labs  CLIA-certified to perform high complexity testing. Test results should be  correlated with clinical presentation, patient history, and epidemiology.          Assessment and plan  ---------------------------  87 yo F from [facility] re[facility], CVA with residual left weakness (~3 years ago), PEG for dysphagia, hypothyroid, COPD (on no home Oxygen), HTN, gout, p/w fever, Pt has respiratory distress, wheezing, concerning for respiratory infection, otherwise no other symptoms is reported on the chart. EMS gave solumedrol 125 through peripheral, small iv line.  pt is well known to me with hx of htn, ashd, s/p MI, cva with increasing sob on bipap in er.  can not get any hx from the pt.  continue bp meds  dvt prophylaxis  will consider to possible repeat echo  duoneb  dvt prophylaxis  ct scan/ surgery/ ID noted  continue amlodipine and Metoprolol for now  tachycardia sec to underlying condition, continue to observe  oob to chair as tolerated  replete K  abx as per ID on vanco fu level  repeat blood cultures, negative  will repeat  TTE in 6 weeks  on iv beta blocker for bp/hr control  events noted  ?mucus pluge/ bronch/ pulmonary appreciated, l lung collapse, pulmonary appreciated  observe closely  doing better, pulmonary follow up, chest x ray noted  oob to chair  continue dvt prophylaxis

## 2022-01-11 NOTE — PROGRESS NOTE ADULT - ASSESSMENT
87 yo F from [place] [facility], CVA with residual left weakness (~3 years ago), PEG for dysphagia, hypothyroid, COPD (on no home Oxygen), HTN, gout, admitted 12/19/21 with fever and SOB  She had right simple mastectomy in 4/2019.  Any Rx after that is unknown. 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   Respiratory issues managed and stabilized as in chart  Patient is s/p right simple mastectomy in 4/2019 for right breast [id number] breast cancer, ER, [place] positive and Her-2 negative.   She would ideally have received adjuvant hormonal therapy.  Which was likely not done because of co morbidities  Eventually can get bone scan and further imaging of hip as needed as outpatient for acetabular lesion and if looks metastatic use hormonal therapy if feasible.  She is at present still on abx  with PICC  Tried to talk to son several times unsuccessfully 85 yo F from [facility], CVA with residual left weakness (~3 years ago), PEG for dysphagia, hypothyroid, COPD (on no home Oxygen), HTN, gout, admitted 12/19/21 with fever and SOB  She had right simple mastectomy in 4/2019.  Any Rx after that is unknown. 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   Respiratory issues managed and stabilized as in chart  Patient is s/p right simple mastectomy in 4/2019 for right breast [id number] breast cancer, ER, [place] positive and Her-2 negative.   She would ideally have received adjuvant hormonal therapy.  Which was likely not done because of co morbidities  Eventually can get bone scan and further imaging of hip as needed as outpatient for acetabular lesion and if looks metastatic use hormonal therapy if feasible.  She is at present still on abx  with PICC  Tried to talk to son several times unsuccessfully  Addendum: Son eventually answered the phone and I was able to discuss regarding h/o breast cancer and plan with him.   He expressed understanding and was ok with plan

## 2022-01-11 NOTE — PROGRESS NOTE ADULT - SUBJECTIVE AND OBJECTIVE BOX
85 yo F from [place], CVA with residual left weakness (~3 years ago), PEG for dysphagia, hypothyroid, COPD (on no home Oxygen), HTN, gout, admitted 12/19/21 with fever and SOB  She had right simple mastectomy in 4/2019.  Any Rx after that is unknown. CT noted for acetabular lesion and pncreatic IPMN  Patient was managed for perforated appendix and respiratory issues as per chart during hospital stay  PAST MEDICAL & SURGICAL HISTORY:  MI (myocardial infarction)    HTN (hypertension)    Personal history of asbestosis    Gout    Dyslipidemia    UTI (lower urinary tract infection)    ETOH abuse    CVA (cerebral vascular accident)    History of left shoulder fracture    History of benign breast tumor      Medications:  acetaminophen     Tablet .. 650 milliGRAM(s) Oral every 6 hours PRN Temp greater or equal to 38C (100.4F), Mild Pain (1 - 3)  acetylcysteine 20%  Inhalation 4 milliLiter(s) Inhalation three times a day  albuterol/ipratropium for Nebulization 3 milliLiter(s) Nebulizer every 6 hours  allopurinol 100 milliGRAM(s) Oral daily  artificial  tears Solution 1 Drop(s) Both EYES two times a day  artificial tears (preservative free) Ophthalmic Solution 1 Drop(s) Both EYES four times a day  atorvastatin 20 milliGRAM(s) Oral at bedtime  chlorhexidine 4% Liquid 1 Application(s) Topical <User Schedule>  dextrose 40% Gel 15 Gram(s) Oral once  dextrose 5% + sodium chloride 0.9%. 1000 milliLiter(s) IV Continuous <Continuous>  dextrose 5%. 1000 milliLiter(s) IV Continuous <Continuous>  dextrose 5%. 1000 milliLiter(s) IV Continuous <Continuous>  dextrose 50% Injectable 25 Gram(s) IV Push once  dextrose 50% Injectable 12.5 Gram(s) IV Push once  dextrose 50% Injectable 25 Gram(s) IV Push once  ertapenem  IVPB 1000 milliGRAM(s) IV Intermittent every 24 hours  glucagon  Injectable 1 milliGRAM(s) IntraMuscular once  heparin   Injectable 5000 Unit(s) SubCutaneous every 8 hours  insulin lispro (ADMELOG) corrective regimen sliding scale   SubCutaneous every 6 hours  levETIRAcetam  Solution 750 milliGRAM(s) Oral two times a day  levothyroxine 75 MICROGram(s) Oral daily  melatonin 3 milliGRAM(s) Oral at bedtime PRN Insomnia  metoprolol tartrate 50 milliGRAM(s) Oral two times a day  nystatin Cream 1 Application(s) Topical two times a day  sodium chloride 0.9% lock flush 10 milliLiter(s) IV Push every 1 hour PRN Pre/post blood products, medications, blood draw, and to maintain line patency  sodium chloride 3%  Inhalation 4 milliLiter(s) Inhalation every 6 hours  vancomycin  IVPB 1000 milliGRAM(s) IV Intermittent every 24 hours    Labs:            Radiology:             ROS:  Patient comfortable without distress  No new complain    Vital Signs Last 24 Hrs  T(C): 36.7 (11 Jan 2022 08:44), Max: 37 (10 Js 2022 20:27)  T(F): 98.1 (11 Jan 2022 08:44), Max: 98.6 (10 Js 2022 20:27)  HR: 89 (11 Jan 2022 08:59) (78 - 93)  BP: 138/79 (11 Jan 2022 08:44) (107/56 - 143/64)  BP(mean): --  RR: 18 (11 Jan 2022 08:44) (18 - 18)  SpO2: 95% (11 Jan 2022 09:01) (93% - 96%)    Physical exam:  Obese. onoxygen  No distress  CVS: S1, S2   Chest: bilateral breath sound without rales  Abdomen: soft, not tender, no organomegaly or masses  No focal neuro deficit  No edema      Assessment and Plan:

## 2022-01-11 NOTE — DISCHARGE NOTE NURSING/CASE MANAGEMENT/SOCIAL WORK - PATIENT PORTAL LINK FT
You can access the FollowMyHealth Patient Portal offered by [facility] by registering at the following website: [email or web address]. By joining [facility]’s FollowMyHealth portal, you will also be able to view your health information using other applications (apps) compatible with our system.

## 2022-01-11 NOTE — PROGRESS NOTE ADULT - NUTRITIONAL ASSESSMENT
This patient has been assessed with a concern for Malnutrition and has been determined to have a diagnosis/diagnoses of Morbid obesity (BMI > 40).    This patient is being managed with:   Diet NPO with Tube Feed-  Tube Feeding Modality: Gastrostomy  Glucerna 1.2 Naseem (GLUCERNARTH)  Total Volume for 24 Hours (mL): 1620  Continuous  Starting Tube Feed Rate {mL per Hour}: 10  Increase Tube Feed Rate by (mL): 10     Every 6 hours  Until Goal Tube Feed Rate (mL per Hour): 90  Tube Feed Duration (in Hours): 18  Tube Feed Start Time: 16:00  Entered: Dec 28 2021  3:38PM    
This patient has been assessed with a concern for Malnutrition and has been determined to have a diagnosis/diagnoses of Morbid obesity (BMI > 40).    This patient is being managed with:   Diet NPO with Tube Feed-  Tube Feeding Modality: Gastrostomy  Glucerna 1.2 Naseem (GLUCERNARTH)  Total Volume for 24 Hours (mL): 1620  Continuous  Starting Tube Feed Rate {mL per Hour}: 10  Increase Tube Feed Rate by (mL): 10     Every 6 hours  Until Goal Tube Feed Rate (mL per Hour): 90  Tube Feed Duration (in Hours): 18  Tube Feed Start Time: 16:00  Entered: Jan 6 2022  5:44PM    
This patient has been assessed with a concern for Malnutrition and has been determined to have a diagnosis/diagnoses of Morbid obesity (BMI > 40).    This patient is being managed with:   Diet NPO with Tube Feed-  Tube Feeding Modality: Gastrostomy  Glucerna 1.2 Naseem (GLUCERNARTH)  Total Volume for 24 Hours (mL): 1620  Continuous  Starting Tube Feed Rate {mL per Hour}: 10  Increase Tube Feed Rate by (mL): 10     Every 6 hours  Until Goal Tube Feed Rate (mL per Hour): 90  Tube Feed Duration (in Hours): 18  Tube Feed Start Time: 16:00  Entered: Jan 6 2022  5:44PM    
This patient has been assessed with a concern for Malnutrition and has been determined to have a diagnosis/diagnoses of Morbid obesity (BMI > 40).    This patient is being managed with:   Diet NPO with Tube Feed-  Tube Feeding Modality: Gastrostomy  Glucerna 1.2 Naseem (GLUCERNARTH)  Total Volume for 24 Hours (mL): 1620  Continuous  Starting Tube Feed Rate {mL per Hour}: 10  Increase Tube Feed Rate by (mL): 10     Every 6 hours  Until Goal Tube Feed Rate (mL per Hour): 90  Tube Feed Duration (in Hours): 18  Tube Feed Start Time: 16:00  Entered: Js  3 2022  3:53PM    
This patient has been assessed with a concern for Malnutrition and has been determined to have a diagnosis/diagnoses of Morbid obesity (BMI > 40).    This patient is being managed with:   Diet NPO with Tube Feed-  Tube Feeding Modality: Gastrostomy  Glucerna 1.2 Naseem (GLUCERNARTH)  Total Volume for 24 Hours (mL): 1530  Continuous  Starting Tube Feed Rate {mL per Hour}: 10  Increase Tube Feed Rate by (mL): 10     Every 6 hours  Until Goal Tube Feed Rate (mL per Hour): 85  Tube Feed Duration (in Hours): 18  Tube Feed Start Time: 08:00  Entered: Dec 21 2021  8:05AM    
This patient has been assessed with a concern for Malnutrition and has been determined to have a diagnosis/diagnoses of Morbid obesity (BMI > 40).    This patient is being managed with:   Diet NPO with Tube Feed-  Tube Feeding Modality: Gastrostomy  Glucerna 1.2 Naseem (GLUCERNARTH)  Total Volume for 24 Hours (mL): 1530  Continuous  Starting Tube Feed Rate {mL per Hour}: 10  Increase Tube Feed Rate by (mL): 10     Every 6 hours  Until Goal Tube Feed Rate (mL per Hour): 85  Tube Feed Duration (in Hours): 18  Tube Feed Start Time: 08:00  Entered: Dec 21 2021  8:05AM    
This patient has been assessed with a concern for Malnutrition and has been determined to have a diagnosis/diagnoses of Morbid obesity (BMI > 40).    This patient is being managed with:   Diet NPO with Tube Feed-  Tube Feeding Modality: Gastrostomy  Glucerna 1.2 Naseem (GLUCERNARTH)  Total Volume for 24 Hours (mL): 1620  Continuous  Starting Tube Feed Rate {mL per Hour}: 10  Increase Tube Feed Rate by (mL): 10     Every 6 hours  Until Goal Tube Feed Rate (mL per Hour): 90  Tube Feed Duration (in Hours): 18  Tube Feed Start Time: 16:00  Entered: Dec 28 2021  3:38PM    
This patient has been assessed with a concern for Malnutrition and has been determined to have a diagnosis/diagnoses of Morbid obesity (BMI > 40).    This patient is being managed with:   Diet NPO with Tube Feed-  Tube Feeding Modality: Gastrostomy  Glucerna 1.2 Naseem (GLUCERNARTH)  Total Volume for 24 Hours (mL): 1620  Continuous  Starting Tube Feed Rate {mL per Hour}: 10  Increase Tube Feed Rate by (mL): 10     Every 6 hours  Until Goal Tube Feed Rate (mL per Hour): 90  Tube Feed Duration (in Hours): 18  Tube Feed Start Time: 16:00  Entered: Jan 6 2022  5:44PM    
This patient has been assessed with a concern for Malnutrition and has been determined to have a diagnosis/diagnoses of Morbid obesity (BMI > 40).    This patient is being managed with:   Diet NPO with Tube Feed-  Tube Feeding Modality: Gastrostomy  Glucerna 1.2 Naseem (GLUCERNARTH)  Total Volume for 24 Hours (mL): 1620  Continuous  Starting Tube Feed Rate {mL per Hour}: 10  Increase Tube Feed Rate by (mL): 10     Every 6 hours  Until Goal Tube Feed Rate (mL per Hour): 90  Tube Feed Duration (in Hours): 18  Tube Feed Start Time: 16:00  Entered: Js  3 2022  3:53PM    
This patient has been assessed with a concern for Malnutrition and has been determined to have a diagnosis/diagnoses of Morbid obesity (BMI > 40).    This patient is being managed with:   Diet NPO with Tube Feed-  Tube Feeding Modality: Gastrostomy  Jevity 1.2 Naseem (JEVITY1.2RTH)  Total Volume for 24 Hours (mL): 1000  Bolus  Total Volume of Bolus (mL):  250  Tube Feed Frequency: Every 6 hours   Tube Feed Start Time: 16:00  Bolus Feed Rate (mL per Hour): 250   Bolus Feed Duration (in Hours): 1  Entered: Dec 20 2021  9:39AM    
This patient has been assessed with a concern for Malnutrition and has been determined to have a diagnosis/diagnoses of Morbid obesity (BMI > 40).    This patient is being managed with:   Diet NPO after Midnight-     NPO Start Date: 05-Jan-2022   NPO Start Time: 23:59  Entered: Jan 5 2022  9:42AM    Diet NPO with Tube Feed-  Tube Feeding Modality: Gastrostomy  Glucerna 1.2 Naseem ([facility])  Total Volume for 24 Hours (mL): 1620  Continuous  Starting Tube Feed Rate {mL per Hour}: 10  Increase Tube Feed Rate by (mL): 10     Every 6 hours  Until Goal Tube Feed Rate (mL per Hour): 90  Tube Feed Duration (in Hours): 18  Tube Feed Start Time: 16:00  Entered: Js  3 2022  3:53PM    
This patient has been assessed with a concern for Malnutrition and has been determined to have a diagnosis/diagnoses of Morbid obesity (BMI > 40).    This patient is being managed with:   Diet NPO with Tube Feed-  Tube Feeding Modality: Gastrostomy  Glucerna 1.2 Naseem (GLUCERNARTH)  Total Volume for 24 Hours (mL): 1530  Continuous  Starting Tube Feed Rate {mL per Hour}: 10  Increase Tube Feed Rate by (mL): 10     Every 6 hours  Until Goal Tube Feed Rate (mL per Hour): 85  Tube Feed Duration (in Hours): 18  Tube Feed Start Time: 08:00  Entered: Dec 21 2021  8:05AM    
This patient has been assessed with a concern for Malnutrition and has been determined to have a diagnosis/diagnoses of Morbid obesity (BMI > 40).    This patient is being managed with:   Diet NPO with Tube Feed-  Tube Feeding Modality: Gastrostomy  Glucerna 1.2 Naseem (GLUCERNARTH)  Total Volume for 24 Hours (mL): 1620  Continuous  Starting Tube Feed Rate {mL per Hour}: 10  Increase Tube Feed Rate by (mL): 10     Every 6 hours  Until Goal Tube Feed Rate (mL per Hour): 90  Tube Feed Duration (in Hours): 18  Tube Feed Start Time: 16:00  Entered: Dec 28 2021  3:38PM    
This patient has been assessed with a concern for Malnutrition and has been determined to have a diagnosis/diagnoses of Morbid obesity (BMI > 40).      
This patient has been assessed with a concern for Malnutrition and has been determined to have a diagnosis/diagnoses of Morbid obesity (BMI > 40).    This patient is being managed with:   Diet NPO after Midnight-     NPO Start Date: 22-Dec-2021   NPO Start Time: 23:59  Except Medications  Entered: Dec 22 2021  8:50AM    Diet NPO with Tube Feed-  Tube Feeding Modality: Gastrostomy  [facility] 1.[facility]  Total Volume for 24 Hours (mL): 1530  Continuous  Starting Tube Feed Rate {mL per Hour}: 10  Increase Tube Feed Rate by (mL): 10     Every 6 hours  Until Goal Tube Feed Rate (mL per Hour): 85  Tube Feed Duration (in Hours): 18  Tube Feed Start Time: 08:00  Entered: Dec 21 2021  8:05AM    
This patient has been assessed with a concern for Malnutrition and has been determined to have a diagnosis/diagnoses of Morbid obesity (BMI > 40).    This patient is being managed with:   Diet NPO after Midnight-     NPO Start Date: 22-Dec-2021   NPO Start Time: 23:59  Except Medications  Entered: Dec 22 2021  8:50AM    Diet NPO with Tube Feed-  Tube Feeding Modality: Gastrostomy  [facility]erna 1.2 [facility]ase[facility] ([facility])  Total Volume for 24 Hours (mL): 1530  Continuous  Starting Tube Feed Rate {mL per Hour}: 10  Increase Tube Feed Rate by (mL): 10     Every 6 hours  Until Goal Tube Feed Rate (mL per Hour): 85  Tube Feed Duration (in Hours): 18  Tube Feed Start Time: 08:00  Entered: Dec 21 2021  8:05AM    
This patient has been assessed with a concern for Malnutrition and has been determined to have a diagnosis/diagnoses of Morbid obesity (BMI > 40).    This patient is being managed with:   Diet NPO with Tube Feed-  Tube Feeding Modality: Gastrostomy  Glucerna 1.2 Naseem (GLUCERNARTH)  Total Volume for 24 Hours (mL): 1530  Continuous  Starting Tube Feed Rate {mL per Hour}: 10  Increase Tube Feed Rate by (mL): 10     Every 6 hours  Until Goal Tube Feed Rate (mL per Hour): 85  Tube Feed Duration (in Hours): 18  Tube Feed Start Time: 08:00  Entered: Dec 21 2021  8:05AM    
This patient has been assessed with a concern for Malnutrition and has been determined to have a diagnosis/diagnoses of Morbid obesity (BMI > 40).    This patient is being managed with:   Diet NPO with Tube Feed-  Tube Feeding Modality: Gastrostomy  Glucerna 1.2 Naseem (GLUCERNARTH)  Total Volume for 24 Hours (mL): 1620  Continuous  Starting Tube Feed Rate {mL per Hour}: 10  Increase Tube Feed Rate by (mL): 10     Every 6 hours  Until Goal Tube Feed Rate (mL per Hour): 90  Tube Feed Duration (in Hours): 18  Tube Feed Start Time: 06:00  Tube Feed Stop Time: 00:00  Entered: Js 10 2022  9:05AM    
This patient has been assessed with a concern for Malnutrition and has been determined to have a diagnosis/diagnoses of Morbid obesity (BMI > 40).    This patient is being managed with:   Diet NPO with Tube Feed-  Tube Feeding Modality: Gastrostomy  Glucerna 1.2 Naseem (GLUCERNARTH)  Total Volume for 24 Hours (mL): 1620  Continuous  Starting Tube Feed Rate {mL per Hour}: 10  Increase Tube Feed Rate by (mL): 10     Every 6 hours  Until Goal Tube Feed Rate (mL per Hour): 90  Tube Feed Duration (in Hours): 18  Tube Feed Start Time: 16:00  Entered: Dec 28 2021  3:38PM    
This patient has been assessed with a concern for Malnutrition and has been determined to have a diagnosis/diagnoses of Morbid obesity (BMI > 40).    This patient is being managed with:   Diet NPO with Tube Feed-  Tube Feeding Modality: Gastrostomy  Glucerna 1.2 Naseem (GLUCERNARTH)  Total Volume for 24 Hours (mL): 1620  Continuous  Starting Tube Feed Rate {mL per Hour}: 10  Increase Tube Feed Rate by (mL): 10     Every 6 hours  Until Goal Tube Feed Rate (mL per Hour): 90  Tube Feed Duration (in Hours): 18  Tube Feed Start Time: 16:00  Entered: Dec 28 2021  3:38PM    
This patient has been assessed with a concern for Malnutrition and has been determined to have a diagnosis/diagnoses of Morbid obesity (BMI > 40).    This patient is being managed with:   Diet NPO with Tube Feed-  Tube Feeding Modality: Gastrostomy  Glucerna 1.2 Naseem (GLUCERNARTH)  Total Volume for 24 Hours (mL): 1620  Continuous  Starting Tube Feed Rate {mL per Hour}: 10  Increase Tube Feed Rate by (mL): 10     Every 6 hours  Until Goal Tube Feed Rate (mL per Hour): 90  Tube Feed Duration (in Hours): 18  Tube Feed Start Time: 16:00  Entered: Jan 6 2022  5:44PM    
This patient has been assessed with a concern for Malnutrition and has been determined to have a diagnosis/diagnoses of Morbid obesity (BMI > 40).    This patient is being managed with:   Diet NPO with Tube Feed-  Tube Feeding Modality: Gastrostomy  Glucerna 1.2 Naseem (GLUCERNARTH)  Total Volume for 24 Hours (mL): 1530  Continuous  Starting Tube Feed Rate {mL per Hour}: 10  Increase Tube Feed Rate by (mL): 10     Every 6 hours  Until Goal Tube Feed Rate (mL per Hour): 85  Tube Feed Duration (in Hours): 18  Tube Feed Start Time: 08:00  Entered: Dec 21 2021  8:05AM    
This patient has been assessed with a concern for Malnutrition and has been determined to have a diagnosis/diagnoses of Morbid obesity (BMI > 40).    This patient is being managed with:   Diet NPO with Tube Feed-  Tube Feeding Modality: Gastrostomy  Glucerna 1.2 Naseem (GLUCERNARTH)  Total Volume for 24 Hours (mL): 1530  Continuous  Starting Tube Feed Rate {mL per Hour}: 10  Increase Tube Feed Rate by (mL): 10     Every 6 hours  Until Goal Tube Feed Rate (mL per Hour): 85  Tube Feed Duration (in Hours): 18  Tube Feed Start Time: 08:00  Entered: Dec 21 2021  8:05AM    
This patient has been assessed with a concern for Malnutrition and has been determined to have a diagnosis/diagnoses of Morbid obesity (BMI > 40).    This patient is being managed with:   Diet NPO after Midnight-     NPO Start Date: 22-Dec-2021   NPO Start Time: 23:59  Except Medications  Entered: Dec 22 2021  8:50AM    Diet NPO with Tube Feed-  Tube Feeding Modality: Gastrostomy  Glucerna 1.2 Naseem [facility])  Total Volume for 24 Hours (mL): 1530  Continuous  Starting Tube Feed Rate {mL per Hour}: 10  Increase Tube Feed Rate by (mL): 10     Every 6 hours  Until Goal Tube Feed Rate (mL per Hour): 85  Tube Feed Duration (in Hours): 18  Tube Feed Start Time: 08:00  Entered: Dec 21 2021  8:05AM    
This patient has been assessed with a concern for Malnutrition and has been determined to have a diagnosis/diagnoses of Morbid obesity (BMI > 40).    This patient is being managed with:   Diet NPO with Tube Feed-  Tube Feeding Modality: Gastrostomy  Glucerna 1.2 Naseem (GLUCERNARTH)  Total Volume for 24 Hours (mL): 1530  Continuous  Starting Tube Feed Rate {mL per Hour}: 10  Increase Tube Feed Rate by (mL): 10     Every 6 hours  Until Goal Tube Feed Rate (mL per Hour): 85  Tube Feed Duration (in Hours): 18  Tube Feed Start Time: 08:00  Entered: Dec 21 2021  8:05AM    
This patient has been assessed with a concern for Malnutrition and has been determined to have a diagnosis/diagnoses of Morbid obesity (BMI > 40).    This patient is being managed with:   Diet NPO with Tube Feed-  Tube Feeding Modality: Gastrostomy  Glucerna 1.2 Naseem (GLUCERNARTH)  Total Volume for 24 Hours (mL): 1620  Continuous  Starting Tube Feed Rate {mL per Hour}: 10  Increase Tube Feed Rate by (mL): 10     Every 6 hours  Until Goal Tube Feed Rate (mL per Hour): 90  Tube Feed Duration (in Hours): 18  Tube Feed Start Time: 16:00  Entered: Dec 28 2021  3:38PM    
This patient has been assessed with a concern for Malnutrition and has been determined to have a diagnosis/diagnoses of Morbid obesity (BMI > 40).    This patient is being managed with:   Diet NPO with Tube Feed-  Tube Feeding Modality: Gastrostomy  Glucerna 1.2 Naseem (GLUCERNARTH)  Total Volume for 24 Hours (mL): 1620  Continuous  Starting Tube Feed Rate {mL per Hour}: 10  Increase Tube Feed Rate by (mL): 10     Every 6 hours  Until Goal Tube Feed Rate (mL per Hour): 90  Tube Feed Duration (in Hours): 18  Tube Feed Start Time: 16:00  Entered: Dec 28 2021  3:38PM    
This patient has been assessed with a concern for Malnutrition and has been determined to have a diagnosis/diagnoses of Morbid obesity (BMI > 40).    This patient is being managed with:   Diet NPO after Midnight-     NPO Start Date: 27-Dec-2021   NPO Start Time: 23:59  Except Medications  Entered: Dec 27 2021  9:31AM    Diet NPO with Tube Feed-  Tube Feeding Modality: Gastrostomy  [facility]erna 1.2 Naseem ([facility])  Total Volume for 24 Hours (mL): 1530  Continuous  Starting Tube Feed Rate {mL per Hour}: 10  Increase Tube Feed Rate by (mL): 10     Every 6 hours  Until Goal Tube Feed Rate (mL per Hour): 85  Tube Feed Duration (in Hours): 18  Tube Feed Start Time: 08:00  Entered: Dec 21 2021  8:05AM    
This patient has been assessed with a concern for Malnutrition and has been determined to have a diagnosis/diagnoses of Morbid obesity (BMI > 40).    This patient is being managed with:   Diet NPO with Tube Feed-  Tube Feeding Modality: Gastrostomy  Glucerna 1.2 Naseem (GLUCERNARTH)  Total Volume for 24 Hours (mL): 1530  Continuous  Starting Tube Feed Rate {mL per Hour}: 10  Increase Tube Feed Rate by (mL): 10     Every 6 hours  Until Goal Tube Feed Rate (mL per Hour): 85  Tube Feed Duration (in Hours): 18  Tube Feed Start Time: 08:00  Entered: Dec 21 2021  8:05AM

## 2022-01-11 NOTE — PROGRESS NOTE ADULT - REASON FOR ADMISSION
fevers/sob

## 2022-01-11 NOTE — PROGRESS NOTE ADULT - ASSESSMENT
86   year old female      h/o hemorrhagic CVA, has  PEG,  HTN, MI,      HLD, gout   right Ca breast. s/p mastectomy in 2019,  s/p  sentinel node  localization.  4/4,  were  negative         *  p/w SOB and  acute respiratory distress,  was  on   bipap  in er   with  elevated wbc of 17,000 on arrival,  cxr,? pl effusion, right  lung opacit   *   s/p cva, has  PEG,  npo/,  on  synthroid, Keppra  ,  *  HTN, on meds  per  card  prior  echo,  normal ef  *  h/o ca breast. /, s/p  R  mastectomy  * obesity, bmi is  41  *  bacteremia. / staph epi, meth R  ct  c/a/p, ?  pna, perforated  appendicitis,  ?  mets  to  acetabulum   surg/ IR  and  oncology eval dr pacheco  ,   per  surg, no  intervention  *   sepsis  on  arrival, is  resolving  from  perforated  appendix/ and  Staph epi  bacteremia which is  persistent,  hence  cannot  dismiss  it as  a contaminant    and  also, with  echo, vegetation on  aortic  valve/ endocarditis,    ?  esdras,  may  not  change   rx  plan,  will need  extended  course  of  ab  , per  card   rpt ct   RLL  phlegmon,  and  80  cc of  pus  drained by  IR . c/s  with  ecoli, e coccus  and  rosa mariaell  per card , pt high risk for esdras  and given that . this will not alter rx. pt  will need   extended  course  of ab     on iv  vanco and ertapenem  peg dislodged.  IR   replacements  on 1/3   picc  and iv ab  for 6 weeks, per  ID  s/p rrt   for hypoxia. cxr with complete  collapsed  of  left lung/  pulm d r clarke   s/ p  bronchoscopy  /   pt  with  tracheomalacia  and  collapsed  airways,  makes  this a  difficult  situation, as there is no good rx for this  cxr  with omprovement  may  start   d/c planning/ optimized   per   pulm  proceed  with    d/c  planning    f/p  with  ID  dr merary lei/ pt is a full code. per  family         rd< from: Xray Chest 1 View- PORTABLE-Urgent (12.19.21 @ 09:20) >  IMPRESSION:  Low lung volumes. New small left lower lung hazy opacity may represent   atelectasis versus pleural effusion. Redemonstration of right upper lung   perihilar opacity largely unchanged prior exam.  --- End of Report --  < end of copied text >

## 2022-01-11 NOTE — DISCHARGE NOTE NURSING/CASE MANAGEMENT/SOCIAL WORK - NSDCVIVACCINE_GEN_ALL_CORE_FT
influenza, injectable, quadrivalent, preservative free; 06-Oct-2017 14:55; Kaylyn Pretty (RN); [employer]; 345AX; IntraMuscular; Deltoid Right.; 0.5 milliLiter(s); VIS (VIS Published: 07-Aug-2015, VIS Presented: 06-Oct-2017);

## 2022-01-11 NOTE — PROVIDER CONTACT NOTE (OTHER) - SITUATION
SOB and hypoxia
PEG tube dislodged
pt c/o intolerable pain & screaming. pt says pain is at IV site. Azithromycin running in IV. pt request tyenol
Pt c/o SOB
pt pending CT of chest r/t PEG tube & on bipap. Blood gas drawn to verify stable oxygenation
Pt has been desaturating throughout shift.

## 2022-01-11 NOTE — PROGRESS NOTE ADULT - ASSESSMENT
ASSESSMENT:    86 year old gentlewoman, lifelong non-smoker, without history of intrinsic lung disease. 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 She has a history of HTN, HLD, CAD s/p MI with preserved LVEF and moderate aortic stenosis. The patient was admitted on 12/19 with fever and abdominal pain. She was found to have perforated appendicitis with abscess formation. The patient has been treated with tube drainage (removed several days ago) and antibiotics for a polymicrobial infection (currently on vancomycin and ertapenem). CT scan on admission had debris within the left lobar and more distal airways of the left lower lobe as well as some debris within the right lower lobe airway - there was complete atelectasis of the left lower lobe and subsegmental atelectatic changes within the left upper lobe and dependent portions of the right lung. This was likely due to the patient's weak cough following her CVA. Overnight, the patient developed increased work of breathing, tachycardia, tachypnea and hypoxemia. The patient has been placed on BIPAP which she is finding very uncomfortable. She is currently without respiratory distress and without hypoxemia on FiO2 40%. She has no cough, sputum production, chest congestion or wheeze. No fevers, chills or sweats. No chest pain/pressure or palpitations. CXR "to my eye" reveals  complete opacification of the left hemithorax with leftward tracheal deviation c/w worsening mucous plugging and complete left lung atelectasis    1/10 - improving oxygenation with ongoing reexpansion of the left lung following bronchoscopy    PLAN/RECOMMENDATIONS:    no shortness of breath or hypoxemia on a humidified nasal canula @ 3lpm -> decreased to 2lpm  NIV for sleep to facilitate lung reexpansion  s/p bronchoscopy with removal of copious amounts of purulent mucous from throughout the airways - there was severe tracheobronchomalacia -> cultures are negative  following CXR -> improved aeration of the left lung   continue albuterol/atrovent nebs q6h  continue hypertonic saline and mucomyst nebs to facilitate mucous clearance   continue chest vest therapy   continue aggressive manual chest PT  continue vancomycin x 6 weeks for methicillin resistant Staphylococcus epidermidis bacteremia - ertapenem - ID follow-up   cardiac meds: lopressor/lipitor  DVT prophylaxis - SQ heparin  allopurinol/keppra/synthroid  glucose control    Will follow with you. Plan of care discussed with the patient at bedside and with Dr. Segundo. Discharge planning.    Kennedy Marcano MD, [facility]  [phone]  Pulmonary Medicine

## 2022-01-11 NOTE — DISCHARGE NOTE NURSING/CASE MANAGEMENT/SOCIAL WORK - NSDCPEFALRISK_GEN_ALL_CORE
For information on Fall & Injury Prevention, visit: [email or web address] OR  [email or web address] OR  https://www.cdc.gov/steadi/patient.html

## 2022-01-11 NOTE — PROGRESS NOTE ADULT - SUBJECTIVE AND OBJECTIVE BOX
[facility]    REVIEW OF SYSTEMS:  GEN: no fever,    no chills  RESP: no SOB,   no cough  CVS: no chest pain,   no palpitations  GI: no abdominal pain,   no nausea,   no vomiting,   no constipation,   no diarrhea  : no dysuria,   no frequency  NEURO: no headache,   no dizziness  PSYCH: no depression,   not anxious  Derm : no rash    MEDICATIONS  (STANDING):  acetylcysteine 20%  Inhalation 4 milliLiter(s) Inhalation three times a day  albuterol/ipratropium for Nebulization 3 milliLiter(s) Nebulizer every 6 hours  allopurinol 100 milliGRAM(s) Oral daily  artificial  tears Solution 1 Drop(s) Both EYES two times a day  artificial tears (preservative free) Ophthalmic Solution 1 Drop(s) Both EYES four times a day  atorvastatin 20 milliGRAM(s) Oral at bedtime  chlorhexidine 4% Liquid 1 Application(s) Topical <User Schedule>  dextrose 40% Gel 15 Gram(s) Oral once  dextrose 5% + sodium chloride 0.9%. 1000 milliLiter(s) (50 mL/Hr) IV Continuous <Continuous>  dextrose 5%. 1000 milliLiter(s) (50 mL/Hr) IV Continuous <Continuous>  dextrose 5%. 1000 milliLiter(s) (100 mL/Hr) IV Continuous <Continuous>  dextrose 50% Injectable 25 Gram(s) IV Push once  dextrose 50% Injectable 12.5 Gram(s) IV Push once  dextrose 50% Injectable 25 Gram(s) IV Push once  ertapenem  IVPB 1000 milliGRAM(s) IV Intermittent every 24 hours  glucagon  Injectable 1 milliGRAM(s) IntraMuscular once  heparin   Injectable 5000 Unit(s) SubCutaneous every 8 hours  insulin lispro (ADMELOG) corrective regimen sliding scale   SubCutaneous every 6 hours  levETIRAcetam  Solution 750 milliGRAM(s) Oral two times a day  levothyroxine 75 MICROGram(s) Oral daily  metoprolol tartrate 50 milliGRAM(s) Oral two times a day  nystatin Cream 1 Application(s) Topical two times a day  sodium chloride 3%  Inhalation 4 milliLiter(s) Inhalation every 6 hours  vancomycin  IVPB 1000 milliGRAM(s) IV Intermittent every 24 hours    MEDICATIONS  (PRN):  acetaminophen     Tablet .. 650 milliGRAM(s) Oral every 6 hours PRN Temp greater or equal to 38C (100.4F), Mild Pain (1 - 3)  melatonin 3 milliGRAM(s) Oral at bedtime PRN Insomnia  sodium chloride 0.9% lock flush 10 milliLiter(s) IV Push every 1 hour PRN Pre/post blood products, medications, blood draw, and to maintain line patency      Vital Signs Last 24 Hrs  T(C): 36.9 (11 Jan 2022 04:44), Max: 37 (10 Js 2022 20:27)  T(F): 98.5 (11 Jan 2022 04:44), Max: 98.6 (10 Js 2022 20:27)  HR: 91 (11 Jan 2022 04:44) (78 - 93)  BP: 135/69 (11 Jan 2022 04:44) (107/56 - 143/64)  BP(mean): --  RR: 18 (11 Jan 2022 04:44) (18 - 18)  SpO2: 95% (11 Jan 2022 04:44) (93% - 96%)  CAPILLARY BLOOD GLUCOSE      POCT Blood Glucose.: 141 mg/dL (11 Jan 2022 01:28)  POCT Blood Glucose.: 134 mg/dL (10 Js 2022 22:07)  POCT Blood Glucose.: 144 mg/dL (10 Js 2022 17:30)  POCT Blood Glucose.: 182 mg/dL (10 Js 2022 16:20)  POCT Blood Glucose.: 128 mg/dL (10 Js 2022 11:40)    I&O's Summary    09 Jan 2022 07:01  -  10 Js 2022 07:00  --------------------------------------------------------  IN: 540 mL / OUT: 950 mL / NET: -410 mL    10 Js 2022 07:01  -  11 Jan 2022 06:16  --------------------------------------------------------  IN: 540 mL / OUT: 900 mL / NET: -360 mL        PHYSICAL EXAM:  HEAD:  Atraumatic, Normocephalic  NECK: Supple, No   JVD  CHEST/LUNG:   no     rales,     no,    rhonchi  HEART: Regular rate and rhythm;         murmur  ABDOMEN: Soft, Nontender, ;   EXTREMITIES:    no    edema  NEUROLOGY:  alert    LABS:                          Thyroid Stimulating Hormone, Serum: 2.39 uIU/mL (01-07 @ 09:13)          Consultant(s) Notes Reviewed:      Care Discussed with Consultants/Other Providers:

## 2022-01-11 NOTE — PROVIDER CONTACT NOTE (OTHER) - RECOMMENDATIONS
Provider at bedside.
Tylenol IVPB, run Azithromycin IV at lower rate
pt can not get CT on bipap. order NC and assess pt's saturation
Follow provider orders
Increase supplemental O2, nebulizer?
Notify provider.

## 2022-01-11 NOTE — PROGRESS NOTE ADULT - SUBJECTIVE AND OBJECTIVE BOX
CARDIOLOGY     PROGRESS  NOTE   ________________________________________________    CHIEF COMPLAINT:Patient is a 86y old  Female who presents with a chief complaint of fevers/sob (11 Jan 2022 06:16)  no complain.  	  REVIEW OF SYSTEMS:  CONSTITUTIONAL: No fever, weight loss, or fatigue  EYES: No eye pain, visual disturbances, or discharge  ENT:  No difficulty hearing, tinnitus, vertigo; No sinus or throat pain  NECK: No pain or stiffness  RESPIRATORY: No cough, wheezing, chills or hemoptysis; No Shortness of Breath  CARDIOVASCULAR: No chest pain, palpitations, passing out, dizziness, or leg swelling  GASTROINTESTINAL: No abdominal or epigastric pain. No nausea, vomiting, or hematemesis; No diarrhea or constipation. No melena or hematochezia.  GENITOURINARY: No dysuria, frequency, hematuria, or incontinence  NEUROLOGICAL: No headaches, memory loss, loss of strength, numbness, or tremors  SKIN: No itching, burning, rashes, or lesions   LYMPH Nodes: No enlarged glands  ENDOCRINE: No heat or cold intolerance; No hair loss  MUSCULOSKELETAL: No joint pain or swelling; No muscle, back, or extremity pain  PSYCHIATRIC: No depression, anxiety, mood swings, or difficulty sleeping  HEME/LYMPH: No easy bruising, or bleeding gums  ALLERGY AND IMMUNOLOGIC: No hives or eczema	    [ ] All others negative	  [ ] Unable to obtain    PHYSICAL EXAM:  T(C): 36.9 (01-11-22 @ 04:44), Max: 37 (01-10-22 @ 20:27)  HR: 91 (01-11-22 @ 04:44) (78 - 93)  BP: 135/69 (01-11-22 @ 04:44) (107/56 - 143/64)  RR: 18 (01-11-22 @ 04:44) (18 - 18)  SpO2: 95% (01-11-22 @ 04:44) (93% - 96%)  Wt(kg): --  I&O's Summary    10 Js 2022 07:01  -  11 Jan 2022 07:00  --------------------------------------------------------  IN: 1080 mL / OUT: 1050 mL / NET: 30 mL        Appearance: Normal	  HEENT:   Normal oral mucosa, PERRL, EOMI	  Lymphatic: No lymphadenopathy  Cardiovascular: Normal S1 S2, No JVD, + murmurs, No edema  Respiratory: rhonchi  Psychiatry: A & O x 3, Mood & affect appropriate  Gastrointestinal:  Soft, Non-tender, + BS	  Skin: No rashes, No ecchymoses, No cyanosis	  Neurologic: Non-focal  Extremities: Normal range of motion, No clubbing, cyanosis or edema  Vascular: Peripheral pulses palpable 2+ bilaterally    MEDICATIONS  (STANDING):  acetylcysteine 20%  Inhalation 4 milliLiter(s) Inhalation three times a day  albuterol/ipratropium for Nebulization 3 milliLiter(s) Nebulizer every 6 hours  allopurinol 100 milliGRAM(s) Oral daily  artificial  tears Solution 1 Drop(s) Both EYES two times a day  artificial tears (preservative free) Ophthalmic Solution 1 Drop(s) Both EYES four times a day  atorvastatin 20 milliGRAM(s) Oral at bedtime  chlorhexidine 4% Liquid 1 Application(s) Topical <User Schedule>  dextrose 40% Gel 15 Gram(s) Oral once  dextrose 5% + sodium chloride 0.9%. 1000 milliLiter(s) (50 mL/Hr) IV Continuous <Continuous>  dextrose 5%. 1000 milliLiter(s) (50 mL/Hr) IV Continuous <Continuous>  dextrose 5%. 1000 milliLiter(s) (100 mL/Hr) IV Continuous <Continuous>  dextrose 50% Injectable 25 Gram(s) IV Push once  dextrose 50% Injectable 12.5 Gram(s) IV Push once  dextrose 50% Injectable 25 Gram(s) IV Push once  ertapenem  IVPB 1000 milliGRAM(s) IV Intermittent every 24 hours  glucagon  Injectable 1 milliGRAM(s) IntraMuscular once  heparin   Injectable 5000 Unit(s) SubCutaneous every 8 hours  insulin lispro (ADMELOG) corrective regimen sliding scale   SubCutaneous every 6 hours  levETIRAcetam  Solution 750 milliGRAM(s) Oral two times a day  levothyroxine 75 MICROGram(s) Oral daily  metoprolol tartrate 50 milliGRAM(s) Oral two times a day  nystatin Cream 1 Application(s) Topical two times a day  sodium chloride 3%  Inhalation 4 milliLiter(s) Inhalation every 6 hours  vancomycin  IVPB 1000 milliGRAM(s) IV Intermittent every 24 hours      TELEMETRY: 	    ECG:  	  RADIOLOGY:  OTHER: 	  	  LABS:	 	    CARDIAC MARKERS:                  proBNP: Serum Pro-Brain Natriuretic Peptide: 375 pg/mL (12-19 @ 08:45)    Lipid Profile:   HgA1c:   TSH: Thyroid Stimulating Hormone, Serum: 2.39 uIU/mL (01-07 @ 09:13)          Assessment and plan  ---------------------------  87 yo F from [facility], CVA with residual left weakness (~3 years ago), PEG for dysphagia, hypothyroid, COPD (on no home Oxygen), HTN, gout, p/w fever, Pt has respiratory distress, wheezing, concerning for respiratory infection, otherwise no other symptoms is reported on the chart. EMS gave solumedrol 125 through peripheral, small iv line.  pt is well known to me with hx of htn, ashd, s/p MI, cva with increasing sob on bipap in er.  can not get any hx from the pt.  continue bp meds  dvt prophylaxis  will consider to possible repeat echo  duoneb  dvt prophylaxis  ct scan/ surgery/ ID noted  continue amlodipine and Metoprolol for now  tachycardia sec to underlying condition, continue to observe  oob to chair as tolerated  replete K  abx as per ID on vanco fu level  repeat blood cultures, negative  will repeat  TTE in 6 weeks  on iv beta blocker for bp/hr control  events noted  ?mucus pluge/ bronch/ pulmonary appreciated, l lung collapse, pulmonary appreciated  observe closely  doing better, pulmonary follow up, chest x ray noted  oob to chair  continue dvt prophylaxis  fu vanco level    	         (4) excellent

## 2022-01-11 NOTE — PROVIDER CONTACT NOTE (OTHER) - ASSESSMENT
Pt on 4LNC, oxygen desaturated to 90%. Pt placed on bipap as ordered at night. SPO2 increased to 96%. Pt refused bipap around 0400. O2 4LNC attached, but pt continued to desaturate at 90s. O2 increased to 6L, O2 remained at 90s. Expiratory wheezing observed. No c/o CP. HR 80-90.

## 2022-01-11 NOTE — PROGRESS NOTE ADULT - SUBJECTIVE AND OBJECTIVE BOX
[facility] - PROGRESS NOTE    CHIEF COMPLAINT: acute hypoxic respiratory failure; mucous plugging; left lung atelectasis; weak cough; dysphagia; h/o CVA; perforated appendicitis with abscess formation    INTERVAL HISTORY: s/p bronchoscopy with removal of copious amounts of purulent secretions from throughout the bronchial tree - there was severe tracheobronchomalacia preventing mucous removal -> culture negative; CXR yesterday with continued improvement of aeration in the left lung; no shortness of breath or hypoxemia on a 3lpm nasal canula; occasional weak cough productive of scant sputum; some chest congestion and wheeze; no fevers, chills or sweats; no chest pain/pressure or palpitations; bedbound; enormously tired but easily arousable    REVIEW OF SYSTEMS:  Constitutional: As per interval history  HEENT: Within normal limits  CV: As per interval history  Resp: As per interval history  GI: dysphagia -> PEG   : Within normal limits  Musculoskeletal: Within normal limits  Skin: Within normal limits  Neurological: CVA -> left sided weakness  Psychiatric: Within normal limits  Endocrine: Within normal limits  Hematologic/Lymphatic: Within normal limits  Allergic/Immunologic: Within normal limits    MEDICATIONS:     Pulmonary "  acetylcysteine 20%  Inhalation 4 milliLiter(s) Inhalation three times a day  albuterol/ipratropium for Nebulization 3 milliLiter(s) Nebulizer every 6 hours  sodium chloride 3%  Inhalation 4 milliLiter(s) Inhalation every 6 hours    Anti-microbials:  ertapenem  IVPB 1000 milliGRAM(s) IV Intermittent every 24 hours  vancomycin  IVPB 1000 milliGRAM(s) IV Intermittent every 24 hours    Cardiovascular:  metoprolol tartrate 50 milliGRAM(s) Oral two times a day    Other:  allopurinol 100 milliGRAM(s) Oral daily  artificial  tears Solution 1 Drop(s) Both EYES two times a day  artificial tears (preservative free) Ophthalmic Solution 1 Drop(s) Both EYES four times a day  atorvastatin 20 milliGRAM(s) Oral at bedtime  chlorhexidine 4% Liquid 1 Application(s) Topical <User Schedule>  dextrose 40% Gel 15 Gram(s) Oral once  dextrose 5% + sodium chloride 0.9%. 1000 milliLiter(s) IV Continuous <Continuous>  dextrose 5%. 1000 milliLiter(s) IV Continuous <Continuous>  dextrose 5%. 1000 milliLiter(s) IV Continuous <Continuous>  dextrose 50% Injectable 25 Gram(s) IV Push once  dextrose 50% Injectable 12.5 Gram(s) IV Push once  dextrose 50% Injectable 25 Gram(s) IV Push once  glucagon  Injectable 1 milliGRAM(s) IntraMuscular once  heparin   Injectable 5000 Unit(s) SubCutaneous every 8 hours  insulin lispro (ADMELOG) corrective regimen sliding scale   SubCutaneous every 6 hours  levETIRAcetam  Solution 750 milliGRAM(s) Oral two times a day  levothyroxine 75 MICROGram(s) Oral daily  nystatin Cream 1 Application(s) Topical two times a day    MEDICATIONS  (PRN):  acetaminophen     Tablet .. 650 milliGRAM(s) Oral every 6 hours PRN Temp greater or equal to 38C (100.4F), Mild Pain (1 - 3)  melatonin 3 milliGRAM(s) Oral at bedtime PRN Insomnia  sodium chloride 0.9% lock flush 10 milliLiter(s) IV Push every 1 hour PRN Pre/post blood products, medications, blood draw, and to maintain line patency        OBJECTIVE:    I&O's Detail    10 Js 2022 07:01  -  11 Jan 2022 07:00  --------------------------------------------------------  IN:    Glucerna: 540 mL  Total IN: 540 mL    OUT:    Voided (mL): 1050 mL  Total OUT: 1050 mL    Total NET: -510 mL    POCT Blood Glucose.: 137 mg/dL (11 Jan 2022 06:40)  POCT Blood Glucose.: 141 mg/dL (11 Jan 2022 01:28)  POCT Blood Glucose.: 134 mg/dL (10 Js 2022 22:07)  POCT Blood Glucose.: 144 mg/dL (10 Js 2022 17:30)  POCT Blood Glucose.: 182 mg/dL (10 Js 2022 16:20)  POCT Blood Glucose.: 128 mg/dL (10 Js 2022 11:40)      PHYSICAL EXAM:       ICU Vital Signs Last 24 Hrs  T(C): 36.9 (11 Jan 2022 04:44), Max: 37 (10 Js 2022 20:27)  T(F): 98.5 (11 Jan 2022 04:44), Max: 98.6 (10 Js 2022 20:27)  HR: 91 (11 Jan 2022 04:44) (78 - 93)  BP: 135/69 (11 Jan 2022 04:44) (107/56 - 143/64)  BP(mean): --  ABP: --  ABP(mean): --  RR: 18 (11 Jan 2022 04:44) (18 - 18)  SpO2: 95% (11 Jan 2022 04:44) (93% - 96%) on 4lpm nasal canula     General: Awake. Alert. Cooperative. No distress. Appears stated age. Tired  HEENT: Atraumatic. Normocephalic. Anicteric. Normal oral mucosa. PERRL. EOMI.  Neck: Supple. Trachea midline. Thyroid without enlargement/tenderness/nodules. No carotid bruit. No JVD. Short and wide  Cardiovascular: Regular rate and rhythm. S1 S2 normal. III/VI systolic murmur  Respiratory: Respirations unlabored. Decreased breath sounds left hemithorax. No curvature.  Abdomen: Soft. Non-tender. Non-distended. No organomegaly. No masses. Normal bowel sounds. Obese, PEG.  Extremities: Warm to touch. No clubbing or cyanosis. No pedal edema.   Pulses: 2+ peripheral pulses all extremities.	  Skin: Normal skin color. No rashes or lesions. No ecchymoses. No cyanosis. Warm to touch.  Lymph Nodes: Cervical, supraclavicular and axillary nodes normal  Neurological: Left lower extremity plegia with contraction. Left upper extremity is quite weak. A and O x 3  Psychiatry: Appropriate mood and affect.    LABS:      CBC    WBC  5.34 <==, 5.06 <==, 2.70 <==, 5.05 <==, 6.78 <==    Hemoglobin  8.8 <<==, 8.6 <<==, 4.3 <<==, 8.7 <<==, 9.3 <<==    Hematocrit  29.6 <==, 29.1 <==, 15.8 <==, 29.3 <==, 30.6 <==    Platelets  278 <==, 272 <==, 129 <==, 277 <==, 298 <==      142  |  105  |  8   ----------------------------<  140<H>    01-07  4.3   |  24  |  0.55    LYTES    sodium  142 <==, 140 <==, 144 <==, 139 <==    potassium   4.3 <==, 4.2 <==, 4.2 <==, 4.0 <==    chloride  105 <==, 105 <==, 107 <==, 105 <==    carbon dioxide  24 <==, 22 <==, 18 <==, 17 <==  =============================================================================================  RENAL FUNCTION:    Creatinine:   0.55  <<==, 0.56  <<==, 0.62  <<==, 0.62  <<==    BUN:   8 <==, 8 <==, 10 <==, 10 <==  ============================================================================================  calcium   9.4 <==, 9.5 <==, 9.3 <==, 9.4 <==    phos   4.1 <==    mag   1.9 <==  ============================================================================================  LFTs    AST:   17 <== , 14 <== , 14 <==     ALT:  20  <== , 22  <== , 26  <==     AP:  86  <=, 82  <=, 88  <=    Bili:  0.3  <=, 0.2  <=, 0.2  <=    Venous Blood Gas:  01-05 @ 06:53  7.32/46/45/24/73.0  VBG Lactate: 2.3    ABG - ( 05 Jan 2022 00:27 )  pH: 7.39  /  pCO2: 34    /  pO2: 75    / HCO3: 21    / Base Excess: -3.8  /  SaO2: 96.7      < from: Transthoracic Echocardiogram (12.21.21 @ 14:39) >    Patient name: FRANKI MEHTA  YOB: 1935   Age: 86 (F)   MR#: [id number]  Study Date: 12/21/2021  Location: [address] [place], [place] [id number]: Tena Garnett RDCS  Study quality: Technically difficult/Limited  Referring Physician: Edmond Almazan MD  Blood Pressure: 134/77 mmHg  Height: 163 cm  Weight: 109 kg  BSA: 2.1 m2  Heart Rate: 105 mmHg  ------------------------------------------------------------------------  PROCEDURE: Transthoracic echocardiogram with 2-D, M-Mode  and complete spectral andcolor flow Doppler.  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 No segmental  wall motion abnormalities.  Moderate aortic stenosis.  There may be a vegetation on the left or non-coronary cusp  of the aortic valve. Consider transesophageal  echocardiography.  ------------------------------------------------------------------------  Confirmed on  12/22/2021 - 10:32:40 by Rahul Correa M.D.  ------------------------------------------------------------------------  MICROBIOLOGY:     COVID-19 PCR . (01.04.22 @ 17:38)   COVID-19 PCR: NotDetec:     Culture - Bronchial (01.06.22 @ 18:48)   Culture Results:   Normal Respiratory Regina present     Culture - Blood (12.24.21 @ 17:38)   Specimen Source: .Blood Blood-Venous   Culture Results:   No Growth Final     Culture - Blood (12.24.21 @ 15:22)   Specimen Source: .Blood Blood-Peripheral   Culture Results:   No Growth Final     Culture - Abscess with Gram Stain (12.23.21 @ 18:49)   Specimen Source: .Abscess abdominal abscess   Culture Results:   Few Escherichia coli   Few Morganella morganii   Moderate Enterococcus avium   Numerous Bacteroides thetaiotamcron group "Susceptibilities not performed"   Numerous Clostridium ramosum "Susceptibilities not performed"     Culture - Blood (12.19.21 @ 14:24)   Culture Results:   Growth in aerobic bottle: Staphylococcus epidermidis   Organism Identification: Staphylococcus epidermidis     Culture - Blood (12.19.21 @ 14:24)   Gram Stain:   Growth in aerobic bottle: Gram Positive Cocci in Clusters   Growth in anaerobic bottle: Gram Positive Cocci in Clusters   - Staphylococcus epidermidis, Methicillin resistant: Detec     RADIOLOGY:  [x ] Chest radiographs reviewed and interpreted by me    < from: Xray Chest 1 View- PORTABLE-Urgent (Xray Chest 1 View- PORTABLE-Urgent .) (01.10.22 @ 10:04) >    EXAM:  XR CHEST PORTABLE URGENT 1V                          PROCEDURE DATE:  01/10/2022      FINDINGS:  Left upper extremity PICC terminates in the SVC.  The heart is not accurately assessed in this projection.  Right upper lobe volume loss and patchy opacitycompatible with right   upper lobe patchy atelectasis. Left basilar effusion and adjacent   atelectasis.  There is no pneumothorax.    IMPRESSION:  Atelectasis is the right upper lobe and left lower lobe.    JUANITA WALKER M.D., RADIOLOGY RESIDENT  This document has been electronically signed.  DIANE CLARK MD; Attending Radiologist  This document has been electronically signed. Jan 11 2022  8:17AM  ---------------------------------------------------------------------------------------------------------------  EXAM:  XR CHEST PORTABLE ROUTINE 1V                        EXAM:  XR CHEST PORTABLE URGENT 1V                          PROCEDURE DATE:  01/08/2022      INTERPRETATION:  Clinical Information: Status post bronchoscopy.    Technique: 2 AP chest x-ray(s).    Comparison: 01/06/2022    Findings/  Impression: Left PICC tip at the SVC. Cardiomegaly. Improved aeration of   the left lung, although with persistent small patchy opacities.    YAMILET OWEN MD; Attending Interventional Radiologist  This document has been electronically signed. Jan 8 2022 10:27AM  --------------------------------------------------------------------------------------------------------------   EXAM:  XR CHEST PORTABLE URGENT 1V                          PROCEDURE DATE:  01/06/2022      INTERPRETATION:  EXAMINATION: XR CHEST URGENT    FINDINGS:  Left humeral hardware. Left upper extremity PICC terminates in SVC.  The heart is not accurately assessed in this projection. Mediastinum is   shifted to the left..  Whiteout of the left lung. The right lung is free of consolidation.  No pneumothorax.    IMPRESSION:  Left lung collapse, unchanged.    JUANITA WALKER M.D., RADIOLOGY RESIDENT  This document has been electronically signed.  DIANE CLARK MD; Attending Radiologist  This document has been electronically signed. Jan 6 2022  9:54PM  --------------------------------------------------------------------------------------------------------------  EXAM:  XR CHEST PORTABLE URGENT 1V                        EXAM:  XR CHEST PORTABLE URGENT 1V                          PROCEDURE DATE:  01/05/2022      INTERPRETATION:  EXAMINATION: XR CHEST URGENT, XR CHEST URGENT    CLINICAL INDICATION: Shortness of Breath, follow up    TECHNIQUE: Frontal, portable views of the chest were obtained at 1/4/2022   11:25 PM and 1/5/2022 6:49 AM    COMPARISON: Chest x-ray 12/25/2021.    FINDINGS:  Airway machine/device over left chest and neck.  Heart size cannot be assessed in this projection.  White out of the left lung with tracheal deviation towards the left,   consistent with left lung collapse. The right lung is clear.  There is no pneumothorax or pleural effusion.    IMPRESSION:  Left lung collapse.    HENRIQUE CRUZ MD; Resident Radiologist  This document has been electronically signed.  PRAVIN CASTILLO MD; Attending Radiologist  This document has been electronically signed. Jan 5 2022 10:11AM  --------------------------------------------------------------------------------------------------------------  EXAM:  CT ABDOMEN AND PELVIS IC                        EXAM:  CT CHEST IC                          PROCEDURE DATE:  12/19/2021      FINDINGS:    CHEST:  Motion degradation, particularly at the lung bases.    LUNGS AND LARGE AIRWAYS: Debris within the left lobar and more distal   airways of the left lower lobe. There is likely some debris within the   right lower lobe airways as well, though motion degradation limits   evaluation. Complete atelectasis of the left lower lobe. Subsegmental   atelectatic changes within the left upper lobe and dependent portions of   the right lung.  PLEURA: No pleural effusion. No pneumothorax.  VESSELS: Atherosclerotic changes. Coronary artery calcifications.  HEART: Heart size is normal. Aortic valve calcification. No pericardial   effusion.  MEDIASTINUM AND GIOVANA: No lymphadenopathy.  CHEST WALL AND LOWER NECK: Right mastectomy.    ABDOMEN AND PELVIS:  Motion degradation, most pronounced in the upper abdomen.    LIVER: Normal size and morphology. Subcentimeter calcified granuloma in   the right lobe. No suspicious liver lesion. Hepatic and portal veins are   patent.  BILE DUCTS: Hyperdensity in the region of the distal common bile duct   (series 3 image 130), which may reflect choledocholithiasis or adjacent   pancreatic parenchymal calcification. Bile ducts are of normal caliber,   with the common bile duct measuring up to 5 mm in diameter.  GALLBLADDER: Cholelithiasis.  SPLEEN: Within normal limits.  PANCREAS: Hypodense cystic appearing lesion measuring 1.0 cm in the   pancreatic tail (series 3 image 108), which is new since 7/2/2017, but is   unchanged since 5/28/2021. No main pancreatic duct dilation.  ADRENALS: Within normal limits.  KIDNEYS/URETERS: Within normal limits.    BLADDER: Within normal limits.  REPRODUCTIVEORGANS: Thickening of the endometrial complex at the fundus,   measuring up to 1.6 cm.    BOWEL:  Fluid collection within the right lower quadrant measuring 7.3 x 3.9 x   5.7 cm (series 3 image 209) with surrounding fatty infiltration. The   collection is associated with the tip of the appendix, which is   indistinct, with findings concerning for perforated appendicitis. No   pneumoperitoneum. There is a punctate 1 to 2 mm calcific density within   the collection (series 3 image 12), which may represent an appendicolith.    Small hiatal hernia. Percutaneous gastrostomy tube, the balloon located   within the gastric antrum. Duodenal diverticulum arising from the third   portion of the duodenum. Colonic diverticulosis. No bowel obstruction.    PERITONEUM: Right lower quadrant fluid collection, as described above.  VESSELS: Atherosclerotic changes.  RETROPERITONEUM/LYMPH NODES: No lymphadenopathy.  ABDOMINAL WALL: Small fat-containing umbilical hernia on a background of   diastases recti. Percutaneous gastrostomy tube.  BONES: Fixation hardware of the proximal left humerus. Degenerative   changes. Osseous demineralization. Mild to moderate loss of height of the   T11 and L1 vertebral bodies, unchanged since 2017.    Soft tissue density measuring 2.8 x 1.8 cm centered within the right   posterior medial acetabulum with associated erosion of the cortex (series   4 image 794). Findings are new since 7/2/2017.    IMPRESSION:  Findings concerning for perforated appendicitis with a right lower   quadrant collection associated with the appendiceal tip.    Complete atelectasis of the left lower lobe with debris occupying the   left lower lobe airways. An underlying pneumonia is not excluded.    Indeterminate lesion within the right acetabulum with associated osseous   erosion. Malignancy is not excluded.    Cholelithiasis and suspected choledocholithiasis. No biliary duct   dilation.    Thickening of the endometrial complex. Pelvic ultrasound may be performed   for further assessment.    Pancreatic tail lesion measuring 1 cm, possibly a side branch IPMN. This   can be further assessed with nonemergent contrast enhanced abdominal   MRI/MRCP.    Findings were discussed with Dr. Wade 12/20/2021 9:14 AM by Dr. Alan with readback confirmation.    NICANOR ALAN MD; Attending Radiologist  This document has been electronically signed. Dec 20 2021  9:17AM  ---------------------------------------------------------------------------------------------------------------

## 2022-01-20 NOTE — ED PROCEDURE NOTE - PROCEDURE ADDITIONAL DETAILS
Peripheral IV access in the Emergency Department obtained under dynamic ultrasound guidance with dark nonpulsatile blood return.  Catheter was flushed afterwards without any resistance or resulting extravasation.  IV catheter confirmed in compressible vein after insertion. 20 gauge catheter placed in a peripheral vein in the right upper extremity.
educational POCUS, CT to follow

## 2022-01-20 NOTE — CONSULT NOTE ADULT - SUBJECTIVE AND OBJECTIVE BOX
[facility] - [facility] - CONSULT NOTE    HPI: 86 year old gentlewoman, lifelong non-smoker, without history of intrinsic lung disease. 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 She also has a history of HTN, HLD, CAD s/p MI with preserved LVEF and moderate aortic stenosis. The patient was admitted to [place] on December 19th 2021 with fever and abdominal pain. She was found to have perforated appendicitis with abscess formation. She was treated with tube drainage and antibiotics for a polymicrobial infection. Admission CT had debris within the left lobar and more distal airways of the left lower lobe as well as some debris within the right lower lobe airway - there was complete atelectasis of the left lower lobe and subsegmental atelectatic changes within the left upper lobe and dependent portions of the right lung. The patient developed increased work of breathing, tachycardia, tachypnea and hypoxemia on January 4th due to mucous plugging with complete collapse of the left lung. She underwent bronchoscopy on January 6th with removal of copious amounts of purulent secretions from throughout the bronchial tree - there was severe tracheobronchomalacia. She was treated with bronchodilators, mucolytic nebs, chest vest and nocturnal AVAPS with expansion of the left upper lobe and some reexpansion of the left lower lobe. She was discharged to rehab on January 11th on a 3lpm nasal canula with plans to continue medical and mechanical therapy as well as nocturnal BIPAP. The patient returns from rehab with decreased mental status, dyspnea and hypoxemia treated with 100% NRB. She remains obtunded now on a BIPAP device.    PMHX:  HTN (hypertension)  HLD (dyslipidemia)  CAD (coronary artery disease)  MI (myocardial infarction)  Aortic stenosis  CVA (cerebral vascular accident) - left sided weakness - dysphagia  Gout  UTI (lower urinary tract infection)  Breast cancer    PSHX:  Shoulder fracture repair - left  Mastectomy - right - 2019    FAMILY HISTORY:  No pertinent family history in first degree relatives    SOCIAL HISTORY:  lifelong non-smoker; former EtOH overuse    Pulmonary Medications:   acetylcysteine 20%  Inhalation 4 milliLiter(s) Inhalation every 6 hours  albuterol/ipratropium for Nebulization 3 milliLiter(s) Nebulizer every 6 hours  guaiFENesin Oral Liquid (Sugar-Free) 300 milliGRAM(s) Oral every 6 hours  sodium chloride 3%  Inhalation 4 milliLiter(s) Inhalation every 6 hours      Antimicrobials:  ertapenem  IVPB 1000 milliGRAM(s) IV Intermittent every 24 hours  vancomycin  IVPB 1000 milliGRAM(s) IV Intermittent every 24 hours      Cardiology:  amLODIPine   Tablet 10 milliGRAM(s) Oral daily  metoprolol tartrate 25 milliGRAM(s) Oral two times a day    Other:  allopurinol 100 milliGRAM(s) Oral daily  atorvastatin 20 milliGRAM(s) Oral at bedtime  heparin SubCutaneous Injection - Peds 5000 Unit(s) SubCutaneous every 8 hours  levETIRAcetam  Solution 750 milliGRAM(s) Oral two times a day  levothyroxine 75 MICROGram(s) Oral daily      Prn:  MEDICATIONS  (PRN):      Allergies    Toradol (Urticaria (Mild to Mod); Rash (Mild to Mod))    HOME MEDICATIONS: see  H & P    REVIEW OF SYSTEMS:  Constitutional: As per HPI  HEENT: Within normal limits  CV: As per HPI  Resp: As per HPI  GI: dysphagia  : Within normal limits  Musculoskeletal: Within normal limits  Skin: Within normal limits  Neurological: CVA - > left sided weakness; obtunded  Psychiatric: Within normal limits  Endocrine: Within normal limits  Hematologic/Lymphatic: Within normal limits  Allergic/Immunologic: Within normal limits    [x] All other systems negative    OBJECTIVE:    Daily Height in cm: 162.56 (20 Jan 2022 08:07)      PHYSICAL EXAM:  ICU Vital Signs Last 24 Hrs  T(C): 36.8 (20 Jan 2022 09:07), Max: 36.8 (20 Jan 2022 09:07)  T(F): 98.3 (20 Jan 2022 09:07), Max: 98.3 (20 Jan 2022 09:07)  HR: 95 (20 Jan 2022 13:00) (95 - 109)  BP: 128/70 (20 Jan 2022 13:00) (116/49 - 128/70)  BP(mean): --  ABP: --  ABP(mean): --  RR: 21 (20 Jan 2022 13:00) (20 - 24)  SpO2: 95% (20 Jan 2022 13:00) (91% - 98%) on 60% BIPAP    General: Obtunded. Moaning. Appears stated age 	  HEENT:  Atraumatic. Normocephalic. Anicteric. Normal oral mucosa. PERRL. EOMI. BIPAP mask  Neck: Supple. Trachea midline. Thyroid without enlargement/tenderness/nodules. No carotid bruit. No JVD. Short and wide  Cardiovascular: Tachycardic rate and rhythm. S1 S2 normal. III/VI systolic murmur  Respiratory: Respirations unlabored. Decreased breath sounds left hemithorax. Diffuse course breath sounds. No curvature.  Abdomen: Soft. Non-tender. Non-distended. No organomegaly. No masses. Normal bowel sounds. Obese, PEG.  Extremities: Warm to touch. No clubbing or cyanosis. No pedal edema. LUE PICC line  Pulses: 2+ peripheral pulses all extremities.	  Skin: Normal skin color. No rashes or lesions. No ecchymoses. No cyanosis. Warm to touch.  Lymph Nodes: Cervical, supraclavicular and axillary nodes normal  Neurological: Left lower extremity plegia with contraction. Left upper extremity is quite weak. A and O x 0  Psychiatry: Calm      LABS:                          9.6    9.83  )-----------( 183      ( 20 Jan 2022 08:53 )             32.3     CBC    WBC  9.83 <==    Hemoglobin  9.6 <<==    Hematocrit  32.3 <==    Platelets  183 <==      141  |  103  |  32<H>  ----------------------------<  188<H>    01-20  4.9   |  24  |  0.62    LYTES    sodium  141 <==    potassium   4.9 <==    chloride  103 <==    carbon dioxide  24 <==    =============================================================================================  RENAL FUNCTION:    Creatinine:   0.62  <<==    BUN:   32 <==    ============================================================================================    calcium   9.9 <==    ============================================================================================  LFTs    AST:   29 <==     ALT:  16  <==     AP:  105  <=    Bili:  0.3  <=    PT/INR - ( 20 Jan 2022 08:53 )   PT: 12.0 sec;   INR: 1.00 ratio       PTT - ( 20 Jan 2022 08:53 )  PTT:31.0 sec    Venous Blood Gas:  01-20 @ 08:53  7.34/55/52/30/83.0  VBG Lactate: 1.7    Serum Pro-Brain Natriuretic Peptide: 148 pg/mL (01-20 @ 08:53)    CARDIAC MARKERS ( 20 Jan 2022 08:53 )  CPK x     /CKMB x     /CKMB Units x        troponin 21 ng/L    < from: Transthoracic Echocardiogram (12.21.21 @ 14:39) >    Patient name: FRANKI MEHTA  YOB: 1935   Age: 86 (F)   MR#: [id number]  Study Date: 12/21/2021  Location: [address] [place], [place] [id number]: Tena Garnett OBED  Study quality: Technically difficult/Limited  Referring Physician: Edmond Almazan MD  Blood Pressure: 134/77 mmHg  Height: 163 cm  Weight: 109 kg  BSA: 2.1 m2  Heart Rate: 105 mmHg  ------------------------------------------------------------------------  PROCEDURE: Transthoracic echocardiogram with 2-D, M-Mode  and complete spectral andcolor flow Doppler.  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 No segmental  wall motion abnormalities.  Moderate aortic stenosis.  There may be a vegetation on the left or non-coronary cusp  of the aortic valve. Consider transesophageal  echocardiography.  ------------------------------------------------------------------------  Confirmed on  12/22/2021 - 10:32:40 by Rahul Correa M.D.  ------------------------------------------------------------------------    MICROBIOLOGY:         Culture - Bronchial (01.06.22 @ 18:48)   Culture Results:   Normal Respiratory Regina present     Culture - Abscess with Gram Stain (12.23.21 @ 18:49)   Specimen Source: .Abscess abdominal abscess   Culture Results:   Few Escherichia coli   Few Morganella morganii   Moderate Enterococcus avium   Numerous Bacteroides thetaiotamcron group "Susceptibilities not performed"   Numerous Clostridium ramosum "Susceptibilities not performed"     Culture - Blood (12.19.21 @ 14:24)   Culture Results:   Growth in aerobic bottle: Staphylococcus epidermidis   Organism Identification: Staphylococcus epidermidis     Culture - Blood (12.19.21 @ 14:24)   Gram Stain:   Growth in aerobic bottle: Gram Positive Cocci in Clusters   Growth in anaerobic bottle: Gram Positive Cocci in Clusters   - Staphylococcus epidermidis, Methicillin resistant: Detec       RADIOLOGY:  [x ] Chest radiographs reviewed and interpreted by me    < from: CT Abdomen and Pelvis w/ IV Cont (01.20.22 @ 10:35) >    EXAM:  CT ABDOMEN AND PELVIS IC                        [facility]                          PROCEDURE DATE:  01/20/2022      FINDINGS:    CHEST:    PULMONARY ANGIOGRAM: Limited study secondary to extensive respiratory   motion artifact. No evidence of large central pulmonary embolism, with   limited evaluation of lobar, segmental and subsegmental branches. The   pulmonary artery is enlarged measuring 3.3 cm, nonspecific although can   be seen with pulmonary hypertension.    MEDIASTINUM/LYMPH NODES: No mediastinal or hilar lymphadenopathy. Small   hiatal hernia.    AIRWAYS/LUNGS/PLEURA: Degraded by respiratory motion artifact. There is   partial left upper lobe and complete lingular and left lower lobe   collapse with superimposed bronchial impaction. There is a small left   pleural effusion with intrafissural extension. There are patchy   groundglass opacities at the right apex, likely infectious/inflammatory.   No pneumothorax. Mild subpleural reticulation along the right middle lobe   may be secondary to right breast/chest wall radiotherapy.    HEART/VASCULATURE: Heart size is normal. No pericardial effusion. Mild   aortic valvular and mitral annular calcification. A left PICC is present   which has repositioned between the acquisition of the  imaging   (terminating in the SVC on ) and the chest CTA, now crossing midline   into the right innominate vein.    CHEST WALL AND LOWER NECK: The thyroid is incompletely characterized on   this study. Status post right mastectomy.    ABDOMEN AND PELVIS:    LIVER: Within normal limits. Calcified hepatic granuloma.    BILE DUCTS: No intrahepatic biliary ductal dilatation. Redemonstration of   choledocholithiasis in the ampullary region, unchanged since 12/30/2021.    GALLBLADDER: Cholelithiasis.    SPLEEN: Within normal limits.    PANCREAS: 1 cm pancreatic tail cyst.    ADRENALS: Within normal limits.    KIDNEYS/URETERS: Mildly atrophic left kidney. No hydronephrosis.    BLADDER: Within normal limits.    REPRODUCTIVE ORGANS: Uterus present. There is a 1.4 cm right adnexal cyst.    BOWEL: Percutaneous gastrostomy tube present with intraluminal balloon.   No bowel obstruction. Colonic diverticulosis without diverticulitis.   Interval removal of the right lower quadrant pigtail catheter in the   region of the appendix. There is a residual, multilocular 4.0 x 2.2 cm   collection with peripheral enhancement (series 3, image 84) in the region   of previously perforated appendix. Previously seen periappendiceal fat   stranding has largely resolved.    PERITONEUM: No ascites.    VESSELS: Aortic calcifications. Hypodense lesion noted at the origin of   the SMA which was previously seen but not as well appreciated on prior   noncontrast enhanced study on 12/30/2021. SMA however appears patent.    RETROPERITONEUM/LYMPH NODES: No lymphadenopathy.    ABDOMINAL WALL: . Fat-containing umbilical hernia.    BONES: Remote left proximal humeral fracture status post ORIF. Unchanged   T11 and L1 superior endplate compression deformities.    IMPRESSION:  Limited exam for pulmonary embolism; no central pulmonary embolism with   nondiagnostic evaluation of lobar, segmental and subsegmental branches.   No imaging evidence of right heart strain.    Complete lingular and left lower lobe collapse withsuperimposed   bronchial impaction. Small left pleural effusion. Mild patchy right   apical groundglass may be related to pulmonary edema, infection or   inflammation.    Multilocular 4.0 x 2.2 cm right lower quadrant fluid collection in the   regionof previously perforated appendix, status post drain removal.   Periappendiceal fat stranding has largely resolved.    Left PICC crosses midline and terminates in the right innominate vein,   likely repositioned during the administration of contrast between    imaging and CTA acquisition.    Cholelithiasis and choledocholithiasis. No intrahepatic biliary ductal   dilatation.    MARKO GUAMAN DO; Resident Radiologist  This document has been electronically signed.  SHARATH TURCIOS M.D., Attending Radiologist  This document has been electronically signed. Js 20 2022 12:48PM  ---------------------------------------------------------------------------------------------------------------  < from: CT Head No Cont (01.20.22 @ 10:35) >    EXAM:  CT BRAIN                          PROCEDURE DATE:  01/20/2022      FINDINGS:    Study is limited by motion.    No hydrocephalus, midline shift, or effacement of the basal cisterns. No   gross evidence for acute intracranial hemorrhage or brain edema.    Complete opacification of the right maxillary sinus and right mastoid air   cells.    IMPRESSION:    Limited by motion.    No gross evidence for acute intracranial hemorrhage or brain edema.    No hydrocephalus or midline shift.    Complete opacification of the right maxillary sinus and right mastoid air   cells. Correlate for the presence of sinusitis/mastoiditis.    LESLI KING MD; Attending Radiologist  This document has been electronically signed. Jan 20 2022 10:38AM  ---------------------------------------------------------------------------------------------------------------         [facility] - [facility] - CONSULT NOTE    HPI: 86 year old gentlewoman, lifelong non-smoker, without history of intrinsic lung disease. 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 She also has a history of HTN, HLD, CAD s/p MI with preserved LVEF and moderate aortic stenosis. The patient was admitted to [place] on December 19th 2021 with fever and abdominal pain. She was found to have perforated appendicitis with abscess formation. She was treated with tube drainage and antibiotics for a polymicrobial infection. Admission CT had debris within the left lobar and more distal airways of the left lower lobe as well as some debris within the right lower lobe airway - there was complete atelectasis of the left lower lobe and subsegmental atelectatic changes within the left upper lobe and dependent portions of the right lung. The patient developed increased work of breathing, tachycardia, tachypnea and hypoxemia on January 4th due to mucous plugging with complete collapse of the left lung. She underwent bronchoscopy on January 6th with removal of copious amounts of purulent secretions from throughout the bronchial tree - there was severe tracheobronchomalacia. She was treated with bronchodilators, mucolytic nebs, chest vest and nocturnal AVAPS with expansion of the left upper lobe and some reexpansion of the left lower lobe. She was discharged to rehab on January 11th on a 3lpm nasal canula with plans to continue medical and mechanical therapy as well as nocturnal BIPAP. The patient returns from rehab with decreased mental status, dyspnea and hypoxemia treated with 100% NRB. She remains obtunded now on a BIPAP device.    PMHX:  HTN (hypertension)  HLD (dyslipidemia)  CAD (coronary artery disease)  MI (myocardial infarction)  Aortic stenosis  CVA (cerebral vascular accident) - left sided weakness - dysphagia  Gout  UTI (lower urinary tract infection)  Breast cancer    PSHX:  Shoulder fracture repair - left  Mastectomy - right - 2019    FAMILY HISTORY:  No pertinent family history in first degree relatives    SOCIAL HISTORY:  lifelong non-smoker; former EtOH overuse    Pulmonary Medications:   acetylcysteine 20%  Inhalation 4 milliLiter(s) Inhalation every 6 hours  albuterol/ipratropium for Nebulization 3 milliLiter(s) Nebulizer every 6 hours  guaiFENesin Oral Liquid (Sugar-Free) 300 milliGRAM(s) Oral every 6 hours  sodium chloride 3%  Inhalation 4 milliLiter(s) Inhalation every 6 hours      Antimicrobials:  ertapenem  IVPB 1000 milliGRAM(s) IV Intermittent every 24 hours  vancomycin  IVPB 1000 milliGRAM(s) IV Intermittent every 24 hours      Cardiology:  amLODIPine   Tablet 10 milliGRAM(s) Oral daily  metoprolol tartrate 25 milliGRAM(s) Oral two times a day    Other:  allopurinol 100 milliGRAM(s) Oral daily  atorvastatin 20 milliGRAM(s) Oral at bedtime  heparin SubCutaneous Injection - Peds 5000 Unit(s) SubCutaneous every 8 hours  levETIRAcetam  Solution 750 milliGRAM(s) Oral two times a day  levothyroxine 75 MICROGram(s) Oral daily      Prn:  MEDICATIONS  (PRN):      Allergies    Toradol (Urticaria (Mild to Mod); Rash (Mild to Mod))    HOME MEDICATIONS: see  H & P    REVIEW OF SYSTEMS:  Constitutional: As per HPI  HEENT: Within normal limits  CV: As per HPI  Resp: As per HPI  GI: dysphagia  : Within normal limits  Musculoskeletal: Within normal limits  Skin: Within normal limits  Neurological: CVA - > left sided weakness; obtunded  Psychiatric: Within normal limits  Endocrine: Within normal limits  Hematologic/Lymphatic: Within normal limits  Allergic/Immunologic: Within normal limits    [x] All other systems negative    OBJECTIVE:    Daily Height in cm: 162.56 (20 Jan 2022 08:07)      PHYSICAL EXAM:  ICU Vital Signs Last 24 Hrs  T(C): 36.8 (20 Jan 2022 09:07), Max: 36.8 (20 Jan 2022 09:07)  T(F): 98.3 (20 Jan 2022 09:07), Max: 98.3 (20 Jan 2022 09:07)  HR: 95 (20 Jan 2022 13:00) (95 - 109)  BP: 128/70 (20 Jan 2022 13:00) (116/49 - 128/70)  BP(mean): --  ABP: --  ABP(mean): --  RR: 21 (20 Jan 2022 13:00) (20 - 24)  SpO2: 95% (20 Jan 2022 13:00) (91% - 98%) on 60% BIPAP    General: Obtunded. Moaning. Appears stated age 	  HEENT:  Atraumatic. Normocephalic. Anicteric. Normal oral mucosa. PERRL. EOMI. BIPAP mask  Neck: Supple. Trachea midline. Thyroid without enlargement/tenderness/nodules. No carotid bruit. No JVD. Short and wide  Cardiovascular: Tachycardic rate and rhythm. S1 S2 normal. III/VI systolic murmur  Respiratory: Respirations unlabored. Decreased breath sounds left hemithorax. Diffuse course breath sounds. No curvature.  Abdomen: Soft. Non-tender. Non-distended. No organomegaly. No masses. Normal bowel sounds. Obese, PEG.  Extremities: Warm to touch. No clubbing or cyanosis. No pedal edema. LUE PICC line  Pulses: 2+ peripheral pulses all extremities.	  Skin: Normal skin color. No rashes or lesions. No ecchymoses. No cyanosis. Warm to touch.  Lymph Nodes: Cervical, supraclavicular and axillary nodes normal  Neurological: Left lower extremity plegia with contraction. Left upper extremity is quite weak. A and O x 0  Psychiatry: Calm      LABS:                          9.6    9.83  )-----------( 183      ( 20 Jan 2022 08:53 )             32.3     CBC    WBC  9.83 <==    Hemoglobin  9.6 <<==    Hematocrit  32.3 <==    Platelets  183 <==      141  |  103  |  32<H>  ----------------------------<  188<H>    01-20  4.9   |  24  |  0.62    LYTES    sodium  141 <==    potassium   4.9 <==    chloride  103 <==    carbon dioxide  24 <==    =============================================================================================  RENAL FUNCTION:    Creatinine:   0.62  <<==    BUN:   32 <==    ============================================================================================    calcium   9.9 <==    ============================================================================================  LFTs    AST:   29 <==     ALT:  16  <==     AP:  105  <=    Bili:  0.3  <=    PT/INR - ( 20 Jan 2022 08:53 )   PT: 12.0 sec;   INR: 1.00 ratio       PTT - ( 20 Jan 2022 08:53 )  PTT:31.0 sec    Venous Blood Gas:  01-20 @ 08:53  7.34/55/52/30/83.0  VBG Lactate: 1.7    Serum Pro-Brain Natriuretic Peptide: 148 pg/mL (01-20 @ 08:53)    CARDIAC MARKERS ( 20 Jan 2022 08:53 )  CPK x     /CKMB x     /CKMB Units x        troponin 21 ng/L    < from: Transthoracic Echocardiogram (12.21.21 @ 14:39) >    Patient name: FRANKI MEHTA  YOB: 1935   Age: 86 (F)   MR#: [id number]  Study Date: 12/21/2021  Location: [address] [place], [place] [id number]: Tena Garnett OBED  Study quality: Technically difficult/Limited  Referring Physician: Edmond Almazan MD  Blood Pressure: 134/77 mmHg  Height: 163 cm  Weight: 109 kg  BSA: 2.1 m2  Heart Rate: 105 mmHg  ------------------------------------------------------------------------  PROCEDURE: Transthoracic echocardiogram with 2-D, M-Mode  and complete spectral andcolor flow Doppler.  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 No segmental  wall motion abnormalities.  Moderate aortic stenosis.  There may be a vegetation on the left or non-coronary cusp  of the aortic valve. Consider transesophageal  echocardiography.  ------------------------------------------------------------------------  Confirmed on  12/22/2021 - 10:32:40 by Rahul Correa M.D.  ------------------------------------------------------------------------    MICROBIOLOGY:     Respiratory Viral Panel with COVID-19 by RYAN (01.20.22 @ 08:52)   Rapid RVP Result: [facility]   SARS-CoV-2: NotDete    Culture - Bronchial (01.06.22 @ 18:48)   Culture Results:   Normal Respiratory Regina present     Culture - Abscess with Gram Stain (12.23.21 @ 18:49)   Specimen Source: .Abscess abdominal abscess   Culture Results:   Few Escherichia coli   Few Morganella morganii   Moderate Enterococcus avium   Numerous Bacteroides thetaiotamcron group "Susceptibilities not performed"   Numerous Clostridium ramosum "Susceptibilities not performed"     Culture - Blood (12.19.21 @ 14:24)   Culture Results:   Growth in aerobic bottle: Staphylococcus epidermidis   Organism Identification: Staphylococcus epidermidis     Culture - Blood (12.19.21 @ 14:24)   Gram Stain:   Growth in aerobic bottle: Gram Positive Cocci in Clusters   Growth in anaerobic bottle: Gram Positive Cocci in Clusters   - Staphylococcus epidermidis, Methicillin resistant: Detec       RADIOLOGY:  [x ] Chest radiographs reviewed and interpreted by me    < from: CT Abdomen and Pelvis w/ IV Cont (01.20.22 @ 10:35) >    EXAM:  CT ABDOMEN AND PELVIS IC                        EXAM:  CT ANGIO CHEST PULM [facility]                          PROCEDURE DATE:  01/20/2022      FINDINGS:    CHEST:    PULMONARY ANGIOGRAM: Limited study secondary to extensive respiratory   motion artifact. No evidence of large central pulmonary embolism, with   limited evaluation of lobar, segmental and subsegmental branches. The   pulmonary artery is enlarged measuring 3.3 cm, nonspecific although can   be seen with pulmonary hypertension.    MEDIASTINUM/LYMPH NODES: No mediastinal or hilar lymphadenopathy. Small   hiatal hernia.    AIRWAYS/LUNGS/PLEURA: Degraded by respiratory motion artifact. There is   partial left upper lobe and complete lingular and left lower lobe   collapse with superimposed bronchial impaction. There is a small left   pleural effusion with intrafissural extension. There are patchy   groundglass opacities at the right apex, likely infectious/inflammatory.   No pneumothorax. Mild subpleural reticulation along the right middle lobe   may be secondary to right breast/chest wall radiotherapy.    HEART/VASCULATURE: Heart size is normal. No pericardial effusion. Mild   aortic valvular and mitral annular calcification. A left PICC is present   which has repositioned between the acquisition of the  imaging   (terminating in the SVC on ) and the chest CTA, now crossing midline   into the right innominate vein.    CHEST WALL AND LOWER NECK: The thyroid is incompletely characterized on   this study. Status post right mastectomy.    ABDOMEN AND PELVIS:    LIVER: Within normal limits. Calcified hepatic granuloma.    BILE DUCTS: No intrahepatic biliary ductal dilatation. Redemonstration of   choledocholithiasis in the ampullary region, unchanged since 12/30/2021.    GALLBLADDER: Cholelithiasis.    SPLEEN: Within normal limits.    PANCREAS: 1 cm pancreatic tail cyst.    ADRENALS: Within normal limits.    KIDNEYS/URETERS: Mildly atrophic left kidney. No hydronephrosis.    BLADDER: Within normal limits.    REPRODUCTIVE ORGANS: Uterus present. There is a 1.4 cm right adnexal cyst.    BOWEL: Percutaneous gastrostomy tube present with intraluminal balloon.   No bowel obstruction. Colonic diverticulosis without diverticulitis.   Interval removal of the right lower quadrant pigtail catheter in the   region of the appendix. There is a residual, multilocular 4.0 x 2.2 cm   collection with peripheral enhancement (series 3, image 84) in the region   of previously perforated appendix. Previously seen periappendiceal fat   stranding has largely resolved.    PERITONEUM: No ascites.    VESSELS: Aortic calcifications. Hypodense lesion noted at the origin of   the SMA which was previously seen but not as well appreciated on prior   noncontrast enhanced study on 12/30/2021. SMA however appears patent.    RETROPERITONEUM/LYMPH NODES: No lymphadenopathy.    ABDOMINAL WALL: . Fat-containing umbilical hernia.    BONES: Remote left proximal humeral fracture status post ORIF. Unchanged   T11 and L1 superior endplate compression deformities.    IMPRESSION:  Limited exam for pulmonary embolism; no central pulmonary embolism with   nondiagnostic evaluation of lobar, segmental and subsegmental branches.   No imaging evidence of right heart strain.    Complete lingular and left lower lobe collapse withsuperimposed   bronchial impaction. Small left pleural effusion. Mild patchy right   apical groundglass may be related to pulmonary edema, infection or   inflammation.    Multilocular 4.0 x 2.2 cm right lower quadrant fluid collection in the   regionof previously perforated appendix, status post drain removal.   Periappendiceal fat stranding has largely resolved.    Left PICC crosses midline and terminates in the right innominate vein,   likely repositioned during the administration of contrast between    imaging and CTA acquisition.    Cholelithiasis and choledocholithiasis. No intrahepatic biliary ductal   dilatation.    MARKO GUAMAN DO; Resident Radiologist  This document has been electronically signed.  SHARATH TURCIOS M.D., Attending Radiologist  This document has been electronically signed. Jan 20 2022 12:48PM  ---------------------------------------------------------------------------------------------------------------  < from: CT Head No Cont (01.20.22 @ 10:35) >    EXAM:  CT BRAIN                          PROCEDURE DATE:  01/20/2022      FINDINGS:    Study is limited by motion.    No hydrocephalus, midline shift, or effacement of the basal cisterns. No   gross evidence for acute intracranial hemorrhage or brain edema.    Complete opacification of the right maxillary sinus and right mastoid air   cells.    IMPRESSION:    Limited by motion.    No gross evidence for acute intracranial hemorrhage or brain edema.    No hydrocephalus or midline shift.    Complete opacification of the right maxillary sinus and right mastoid air   cells. Correlate for the presence of sinusitis/mastoiditis.    LESLI KING MD; Attending Radiologist  This document has been electronically signed. Jan 20 2022 10:38AM  ---------------------------------------------------------------------------------------------------------------

## 2022-01-20 NOTE — ED PROVIDER NOTE - CLINICAL SUMMARY MEDICAL DECISION MAKING FREE TEXT BOX
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 Pt admits to abd pain and ttp. Not found to be febrile (rectal temp) on arrival, however, tachycardic, tachypneic and hypoxic. Ddx includes, however, is not limited to: infectious process such as PNA or intraabdominal infection, uti, cardiac d/o such as ACS/CHF, PE, pleural effusion, other. Plan: infectious, cardiac, metabolic w/u, BIPAP for hypoxia/increased WOB, CTA chest and CT A/P, abx as necessary, pain control, TBA

## 2022-01-20 NOTE — CONSULT NOTE ADULT - SUBJECTIVE AND OBJECTIVE BOX
HPI:  87 y/o female, with hx of CVA, MI, breast CA, COPD, s/p recent perforated appendox/abscess,  HN, HLD, hypothyroid,  obesity, relatively bed bound , gout  BIBEMS from [facility] due to SOB., started 1/20/22  Patient's initial 02 sat was 84% placed on NRB and improved to 93%.   She has a picc line to left arm and peg tube  Pulmonary has seen her. She needed bronch last admission for mucous plugging etc. Was sent to Rehab on oxygen and BIPAP at night     PAST MEDICAL & SURGICAL HISTORY:  MI (myocardial infarction)    HTN (hypertension)    Personal history of asbestosis    Gout    Dyslipidemia    UTI (lower urinary tract infection)    ETOH abuse    CVA (cerebral vascular accident)    History of left shoulder fracture    History of benign breast tumor        Allergies:    Family history:  Social history:  Meds:  acetylcysteine 20%  Inhalation 4 milliLiter(s) Inhalation every 6 hours  albuterol/ipratropium for Nebulization 3 milliLiter(s) Nebulizer every 6 hours  allopurinol 100 milliGRAM(s) Oral daily  amLODIPine   Tablet 10 milliGRAM(s) Oral daily  atorvastatin 20 milliGRAM(s) Oral at bedtime  chlorhexidine 2% Cloths 1 Application(s) Topical <User Schedule>  ertapenem  IVPB 1000 milliGRAM(s) IV Intermittent every 24 hours  guaiFENesin Oral Liquid (Sugar-Free) 300 milliGRAM(s) Oral every 6 hours  heparin SubCutaneous Injection - Peds 5000 Unit(s) SubCutaneous every 8 hours  levETIRAcetam  Solution 750 milliGRAM(s) Oral two times a day  levothyroxine 75 MICROGram(s) Oral daily  metoprolol tartrate 25 milliGRAM(s) Oral two times a day  sodium chloride 3%  Inhalation 4 milliLiter(s) Inhalation every 6 hours  vancomycin  IVPB 1000 milliGRAM(s) IV Intermittent every 24 hours    Labs:  CBC Full  -  ( 20 Jan 2022 08:53 )  WBC Count : 9.83 K/uL  Hemoglobin : 9.6 g/dL  Hematocrit : 32.3 %  Platelet Count - Automated : 183 K/uL  Mean Cell Volume : 109.1 fl  Mean Cell Hemoglobin : 32.4 pg  Mean Cell Hemoglobin Concentration : 29.7 gm/dL  Auto Neutrophil # : x  Auto Lymphocyte # : x  Auto Monocyte # : x  Auto Eosinophil # : x  Auto Basophil # : x  Auto Neutrophil % : x  Auto Lymphocyte % : x  Auto Monocyte % : x  Auto Eosinophil % : x  Auto Basophil % : x    01-20    141  |  103  |  32<H>  ----------------------------<  188<H>  4.9   |  24  |  0.62    Ca    9.9      20 Jan 2022 08:53    TPro  7.5  /  Alb  3.8  /  TBili  0.3  /  DBili  x   /  AST  29  /  ALT  16  /  AlkPhos  105  01-20      Radiology:     < from: CT Angio Chest PE Protocol w/ IV Cont (01.20.22 @ 10:35) >  IMPRESSION:  Limited exam for pulmonary embolism; no central pulmonary embolism with   nondiagnostic evaluation of lobar, segmental and subsegmental branches.   No imaging evidence of right heart strain.    Complete lingular and left lower lobe collapse withsuperimposed   bronchial impaction. Small left pleural effusion. Mild patchy right   apical groundglass may be related to pulmonary edema, infection or   inflammation.    Multilocular 4.0 x 2.2 cm right lower quadrant fluid collection in the   regionof previously perforated appendix, status post drain removal.   Periappendiceal fat stranding has largely resolved.    Left PICC crosses midline and terminates in the right innominate vein,   likely repositioned during the administration of contrast between    imaging and CTA acquisition.    Cholelithiasis and choledocholithiasis. No intrahepatic biliary ductal   dilatation.    < end of copied text >          ROS:  No pain, no fever  No lumps in neck or pain  No Sob or chest pain  No palpitations   No abdominal pain. No change in bowel habit. No blood in stools  No weakness in extremities  No leg swelling      Vital Signs Last 24 Hrs  T(C): 36.4 (21 Jan 2022 04:00), Max: 36.9 (20 Jan 2022 18:00)  T(F): 97.6 (21 Jan 2022 04:00), Max: 98.5 (20 Jan 2022 18:00)  HR: 103 (21 Jan 2022 05:12) (71 - 103)  BP: 109/63 (21 Jan 2022 04:00) (109/63 - 128/70)  BP(mean): --  RR: 18 (21 Jan 2022 04:00) (18 - 21)  SpO2: 97% (21 Jan 2022 05:12) (85% - 98%)    Physical Exam:  Patient comfortable  AXOX3  Neck supple, no LN's  Chest: bilateral breath sounds, no wheeze or rales  CVS: regular heart rate without murmur  Abdomen: soft, BS+, no massess or organomegaly  CNS: no gross deficit  Ext: no edema       HPI:  85 y/o female, with hx of CVA, MI, breast CA, COPD, s/p recent perforated appendox/abscess,  HN, HLD, hypothyroid,  obesity, relatively bed bound , gout  BIBEMS from [facility] due to SOB., started 1/20/22  Patient's initial 02 sat was 84% placed on NRB and improved to 93%.   She has a picc line to left arm and peg tube  Pulmonary has seen her. She needed bronch last admission for mucous plugging etc. Was sent to Rehab on oxygen and BIPAP at night     PAST MEDICAL & SURGICAL HISTORY:  MI (myocardial infarction)    HTN (hypertension)    Personal history of asbestosis    Gout    Dyslipidemia    UTI (lower urinary tract infection)    ETOH abuse    CVA (cerebral vascular accident)    History of left shoulder fracture    History of benign breast tumor        Allergies: torodol    Family history:  Social history: Former smoker  Meds:  acetylcysteine 20%  Inhalation 4 milliLiter(s) Inhalation every 6 hours  albuterol/ipratropium for Nebulization 3 milliLiter(s) Nebulizer every 6 hours  allopurinol 100 milliGRAM(s) Oral daily  amLODIPine   Tablet 10 milliGRAM(s) Oral daily  atorvastatin 20 milliGRAM(s) Oral at bedtime  chlorhexidine 2% Cloths 1 Application(s) Topical <User Schedule>  ertapenem  IVPB 1000 milliGRAM(s) IV Intermittent every 24 hours  guaiFENesin Oral Liquid (Sugar-Free) 300 milliGRAM(s) Oral every 6 hours  heparin SubCutaneous Injection - Peds 5000 Unit(s) SubCutaneous every 8 hours  levETIRAcetam  Solution 750 milliGRAM(s) Oral two times a day  levothyroxine 75 MICROGram(s) Oral daily  metoprolol tartrate 25 milliGRAM(s) Oral two times a day  sodium chloride 3%  Inhalation 4 milliLiter(s) Inhalation every 6 hours  vancomycin  IVPB 1000 milliGRAM(s) IV Intermittent every 24 hours    Labs:  CBC Full  -  ( 20 Jan 2022 08:53 )  WBC Count : 9.83 K/uL  Hemoglobin : 9.6 g/dL  Hematocrit : 32.3 %  Platelet Count - Automated : 183 K/uL  Mean Cell Volume : 109.1 fl  Mean Cell Hemoglobin : 32.4 pg  Mean Cell Hemoglobin Concentration : 29.7 gm/dL  Auto Neutrophil # : x  Auto Lymphocyte # : x  Auto Monocyte # : x  Auto Eosinophil # : x  Auto Basophil # : x  Auto Neutrophil % : x  Auto Lymphocyte % : x  Auto Monocyte % : x  Auto Eosinophil % : x  Auto Basophil % : x    01-20    141  |  103  |  32<H>  ----------------------------<  188<H>  4.9   |  24  |  0.62    Ca    9.9      20 Jan 2022 08:53    TPro  7.5  /  Alb  3.8  /  TBili  0.3  /  DBili  x   /  AST  29  /  ALT  16  /  AlkPhos  105  01-20  B12 normal    Radiology:     < from: CT Angio Chest PE Protocol w/ IV Cont (01.20.22 @ 10:35) >  IMPRESSION:  Limited exam for pulmonary embolism; no central pulmonary embolism with   nondiagnostic evaluation of lobar, segmental and subsegmental branches.   No imaging evidence of right heart strain.    Complete lingular and left lower lobe collapse withsuperimposed   bronchial impaction. Small left pleural effusion. Mild patchy right   apical groundglass may be related to pulmonary edema, infection or   inflammation.    Multilocular 4.0 x 2.2 cm right lower quadrant fluid collection in the   regionof previously perforated appendix, status post drain removal.   Periappendiceal fat stranding has largely resolved.    Left PICC crosses midline and terminates in the right innominate vein,   likely repositioned during the administration of contrast between    imaging and CTA acquisition.    Cholelithiasis and choledocholithiasis. No intrahepatic biliary ductal   dilatation.    < end of copied text >          ROS:  Was no BIPAP.  Not able to contribute       Vital Signs Last 24 Hrs  T(C): 36.4 (21 Jan 2022 04:00), Max: 36.9 (20 Jan 2022 18:00)  T(F): 97.6 (21 Jan 2022 04:00), Max: 98.5 (20 Jan 2022 18:00)  HR: 103 (21 Jan 2022 05:12) (71 - 103)  BP: 109/63 (21 Jan 2022 04:00) (109/63 - 128/70)  BP(mean): --  RR: 18 (21 Jan 2022 04:00) (18 - 21)  SpO2: 97% (21 Jan 2022 05:12) (85% - 98%)    Physical Exam:  Patient lethargic, Obese  Neck supple, no LN's  Chest: bilateral breath sounds, no wheeze or rales  CVS: S1S2  Abdomen: soft, BS+, no massess or organomegaly  CNS: no gross deficit  Ext: no edema

## 2022-01-20 NOTE — CONSULT NOTE ADULT - SUBJECTIVE AND OBJECTIVE BOX
General Surgery Consult  Consulting surgical team: [facility]  Consulting attending: Dr. Ricks    HPI:  86F hx of CVA, MI, breast cancer s/p R mastectomy Dr. Hollis 4/2019, obesity relatively bed bound, gout recently admitted was found to have perforated appendicitis 10/2021 s/p IR drain placement subsequently removed here with SOB found to have LLL collapse and increased RLQ collection since removal of drain. Surgery consulted.  IR will not drain as collection is < 4cm  on invanz/vanc until 2/9 per ID      PAST MEDICAL HISTORY:  MI (myocardial infarction)    HTN (hypertension)    Personal history of asbestosis    Gout    Dyslipidemia    UTI (lower urinary tract infection)    ETOH abuse    CVA (cerebral vascular accident)        PAST SURGICAL HISTORY:  History of left shoulder fracture    History of benign breast tumor        MEDICATIONS:  acetylcysteine 20%  Inhalation 4 milliLiter(s) Inhalation every 6 hours  albuterol/ipratropium for Nebulization 3 milliLiter(s) Nebulizer every 6 hours  allopurinol 100 milliGRAM(s) Oral daily  amLODIPine   Tablet 10 milliGRAM(s) Oral daily  atorvastatin 20 milliGRAM(s) Oral at bedtime  ertapenem  IVPB 1000 milliGRAM(s) IV Intermittent every 24 hours  guaiFENesin Oral Liquid (Sugar-Free) 300 milliGRAM(s) Oral every 6 hours  heparin SubCutaneous Injection - Peds 5000 Unit(s) SubCutaneous every 8 hours  levETIRAcetam  Solution 750 milliGRAM(s) Oral two times a day  levothyroxine 75 MICROGram(s) Oral daily  metoprolol tartrate 25 milliGRAM(s) Oral two times a day  sodium chloride 3%  Inhalation 4 milliLiter(s) Inhalation every 6 hours  vancomycin  IVPB 1000 milliGRAM(s) IV Intermittent every 24 hours      ALLERGIES:  Toradol (Urticaria (Mild to Mod); Rash (Mild to Mod))      VITALS & I/Os:  Vital Signs Last 24 Hrs  T(C): 36.7 (20 Jan 2022 15:00), Max: 36.8 (20 Jan 2022 09:07)  T(F): 98 (20 Jan 2022 15:00), Max: 98.3 (20 Jan 2022 09:07)  HR: 101 (20 Jan 2022 15:55) (88 - 109)  BP: 118/70 (20 Jan 2022 15:00) (116/49 - 128/70)  BP(mean): --  RR: 20 (20 Jan 2022 15:00) (20 - 24)  SpO2: 95% (20 Jan 2022 15:55) (91% - 98%)    I&O's Summary      PHYSICAL EXAM:  General: No acute distress  Respiratory: Nonlabored  Cardiovascular: appears well perfused  Abdominal: Soft, nondistended, nontender. No rebound or guarding. No organomegaly, no palpable mass.  bruising  Extremities: Warm    LABS:                        9.6    9.83  )-----------( 183      ( 20 Jan 2022 08:53 )             32.3     01-20    141  |  103  |  32<H>  ----------------------------<  188<H>  4.9   |  24  |  0.62    Ca    9.9      20 Jan 2022 08:53    TPro  7.5  /  Alb  3.8  /  TBili  0.3  /  DBili  x   /  AST  29  /  ALT  16  /  AlkPhos  105  01-20    Lactate:  01-20 @ 08:53  1.7    PT/INR - ( 20 Jan 2022 08:53 )   PT: 12.0 sec;   INR: 1.00 ratio         PTT - ( 20 Jan 2022 08:53 )  PTT:31.0 sec          Urinalysis Basic - ( 20 Jan 2022 14:56 )    Color: Yellow / Appearance: Slightly Turbid / SG: >1.050 / pH: x  Gluc: x / Ketone: Negative  / Bili: Negative / Urobili: Negative   Blood: x / Protein: 100 / Nitrite: Negative   Leuk Esterase: Large / RBC: 7 /hpf / WBC 25 /HPF   Sq Epi: x / Non Sq Epi: 5 /hpf / Bacteria: Few        IMAGING:    ACC: [id number] EXAM:  CT ABDOMEN AND PELVIS IC                        ACC: [id number] EXAM:  CT ANGIO CHEST PULM ART [facility]                          PROCEDURE DATE:  01/20/2022          INTERPRETATION:  CLINICAL INFORMATION: Shortness of breath with history   of breast cancer, evaluate for pulmonary embolism. Abdominal pain with   recent perforated appendicitis.    COMPARISON: Chest radiograph 1/20/2022, 1/10/2022. CT abdomen pelvis   12/30/2021. CT chest 12/19/2021.    CONTRAST/COMPLICATIONS:  IV Contrast: Omnipaque 350  90 cc administered   10 cc discarded  Oral Contrast: NONE  Complications: None reported at time of study completion    PROCEDURE:  CT Angiography of the Chest was performed followed by portal venous phase   imaging of the Abdomen and Pelvis.  Sagittal and coronal reformats were performed as well as 3D (MIP)   reconstructions.    FINDINGS:    CHEST:    PULMONARY ANGIOGRAM: Limited study secondary to extensive respiratory   motion artifact. No evidence of large central pulmonary embolism, with   limited evaluation of lobar, segmental and subsegmental branches. The   pulmonary artery is enlarged measuring 3.3 cm, nonspecific although can   be seen with pulmonary hypertension.    MEDIASTINUM/LYMPH NODES: No mediastinal or hilar lymphadenopathy. Small   hiatal hernia.    AIRWAYS/LUNGS/PLEURA: Degraded by respiratory motion artifact. There is   partial left upper lobe and complete lingular and left lower lobe   collapse with superimposed bronchial impaction. There is a small left   pleural effusion with intrafissural extension. There are patchy   groundglass opacities at the right apex, likely infectious/inflammatory.   No pneumothorax. Mild subpleural reticulation along the right middle lobe   may be secondary to right breast/chest wall radiotherapy.    HEART/VASCULATURE: Heart size is normal. No pericardial effusion. Mild   aortic valvular and mitral annular calcification. A left PICC is present   which has repositioned between the acquisition of the  imaging   (terminating in the SVC on ) and the chest CTA, now crossing midline   into the right innominate vein.    CHEST WALL AND LOWER NECK: The thyroid is incompletely characterized on   this study. Status post right mastectomy.    ABDOMEN AND PELVIS:    LIVER: Within normal limits. Calcified hepatic granuloma.    BILE DUCTS: No intrahepatic biliary ductal dilatation. Redemonstration of   choledocholithiasis in the ampullary region, unchanged since 12/30/2021.    GALLBLADDER: Cholelithiasis.    SPLEEN: Within normal limits.    PANCREAS: 1 cm pancreatic tail cyst.    ADRENALS: Within normal limits.    KIDNEYS/URETERS: Mildly atrophic left kidney. No hydronephrosis.    BLADDER: Within normal limits.    REPRODUCTIVE ORGANS: Uterus present. There is a 1.4 cm right adnexal cyst.    BOWEL: Percutaneous gastrostomy tube present with intraluminal balloon.   No bowel obstruction. Colonic diverticulosis without diverticulitis.   Interval removal of the right lower quadrant pigtail catheter in the   region of the appendix. There is a residual, multilocular 4.0 x 2.2 cm   collection with peripheral enhancement (series 3, image 84) in the region   of previously perforated appendix. Previously seen periappendiceal fat   stranding has largely resolved.    PERITONEUM: No ascites.    VESSELS: Aortic calcifications. Hypodense lesion noted at the origin of   the SMA which was previously seen but not as well appreciated on prior   noncontrast enhanced study on 12/30/2021. SMA however appears patent.    RETROPERITONEUM/LYMPH NODES: No lymphadenopathy.    ABDOMINAL WALL: . Fat-containing umbilical hernia.    BONES: Remote left proximal humeral fracture status post ORIF. Unchanged   T11 and L1 superior endplate compression deformities.    IMPRESSION:  Limited exam for pulmonary embolism; no central pulmonary embolism with   nondiagnostic evaluation of lobar, segmental and subsegmental branches.   No imaging evidence of right heart strain.    Complete lingular and left lower lobe collapse with superimposed   bronchial impaction. Small left pleural effusion. Mild patchy right   apical groundglass may be related to pulmonary edema, infection or   inflammation.    Multilocular 4.0 x 2.2 cm right lower quadrant fluid collection in the   region of previously perforated appendix, status post drain removal.   Periappendiceal fat stranding has largely resolved.    Left PICC crosses midline and terminates in the right innominate vein,   likely repositioned during the administration of contrast between    imaging and CTA acquisition.    Cholelithiasis and choledocholithiasis. No intrahepatic biliary ductal   dilatation.    --- End of Report ---      MARKO GUAMAN DO; Resident Radiologist  This document has been electronically signed.  SHARATH TURCIOS M.D., Attending Radiologist  This document has been electronically signed. Jan 20 2022 12:48PM

## 2022-01-20 NOTE — CONSULT NOTE ADULT - ASSESSMENT
87 yo F from [facility], CVA with residual left weakness (~3 years ago), PEG for dysphagia, hypothyroid, COPD, HTN, gout, admitted 12/19/21 with fever and SOB. Had perforated appendix treated conservatively with drainage by IR and antibiotics. She had prolonged hospital course. The patient developed increased work of breathing, tachycardia, tachypnea and hypoxemia on January 4th due to mucous plugging with complete collapse of the left lung. She underwent bronchoscopy on January 6th with removal of copious amounts of purulent secretions from throughout the bronchial tree - there was severe tracheobronchomalacia. She was treated with bronchodilators, mucolytic nebs, chest vest and nocturnal AVAPS with expansion of the left upper lobe and some reexpansion of the left lower lobe. She was discharged to rehab on January 11th on a 3l nasal canula with plans to continue medical and mechanical therapy as well as nocturnal BIPAP.   She was seen for her h/o right mastectomy in 4/2019.  Any Rx after that is unknown. 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   Respiratory issues managed and stabilized as in chart  Patient is s/p right simple mastectomy in 4/2019 for right breast [id number] breast cancer, ER, SD positive and Her-2 negative.   She would ideally have received adjuvant hormonal therapy.  Which was likely not done because of co morbidities  Eventually can get bone scan and further imaging of hip as needed as outpatient for acetabular lesion and if looks metastatic use hormonal therapy if feasible.  She is at present still on abx  with PICC  Tried to talk to son several times unsuccessfully  Addendum: Son eventually answered the phone and I was able to discuss regarding h/o breast cancer and plan with him.   He expressed understanding and was ok with plan   I86 yo F from [facility], CVA with residual left weakness (~3 years ago), PEG for dysphagia, hypothyroid, COPD, HTN, gout, admitted 12/19/21 with fever and SOB. Had perforated appendix treated conservatively with drainage by IR and antibiotics. She had prolonged hospital course. The patient developed on January 4th complete collapse of the left lung. She underwent bronchoscopy on January 6th with removal of copious amounts of purulent secretions from throughout the bronchial tree - there was severe tracheobronchomalacia. She was treated with bronchodilators, mucolytic nebs, chest vest and nocturnal AVAPS with expansion of the left upper lobe and some reexpansion of the left lower lobe. She was discharged to rehab on January 11th on a 3l nasal canula with plans to continue medical and mechanical therapy as well as nocturnal BIPAP.   She was admitted 1/20/22 with SOB and pulmonary has seen her  She was seen for h/o right mastectomy in 4/2019 for right breast [id number] breast cancer, ER, MI positive and Her-2 negative  Any Rx after that is unknown. 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  Which was likely not done because of co morbidities  Given her co morbidities it is unlikely that any further evaluation for noted acetabular lesion is feasible or will lead to change in management at this time  Ideally would have donebone scan and further imaging of hip as needed as outpatient for acetabular lesion and if looked metastatic use hormonal therapy.  I had discussed this with patient's son.  At present she is being managed for her respiratory issues and collection from perforated appendix  She has been chronically anemic from her co morbidities, chronic disease etc B12 was normal, and ferritin will likely be elevated from inflammatio

## 2022-01-20 NOTE — CONSULT NOTE ADULT - ASSESSMENT
ASSESSMENT:    86 year old gentlewoman, lifelong non-smoker, without history of intrinsic lung disease. 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 She also has a history of HTN, HLD, CAD s/p MI with preserved LVEF and moderate aortic stenosis. The patient was admitted to [facility] on December 19th 2021 with fever and abdominal pain. She was found to have perforated appendicitis with abscess formation. She was treated with tube drainage and antibiotics for a polymicrobial infection. She continues on vancomycin via a PICC line. Admission CT had debris within the left lobar and more distal airways of the left lower lobe as well as some debris within the right lower lobe airway - there was complete atelectasis of the left lower lobe and subsegmental atelectatic changes within the left upper lobe and dependent portions of the right lung. The patient developed increased work of breathing, tachycardia, tachypnea and hypoxemia on January 4th due to mucous plugging with complete collapse of the left lung. She underwent bronchoscopy on January 6th with removal of copious amounts of purulent secretions from throughout the bronchial tree - there was severe tracheobronchomalacia. Bronchial cultures were negative. She was treated with bronchodilators, mucolytic nebs, chest vest and nocturnal AVAPS with expansion of the left upper lobe and some reexpansion of the left lower lobe. She was discharged to rehab on January 11th on a 3lpm nasal canula with plans to continue medical and mechanical therapy as well as nocturnal BIPAP. The patient returns from rehab with decreased mental status, dyspnea and hypoxemia treated with 100% NRB. She remains obtunded now on a BIPAP device. There is partial left upper lobe and complete lingular and left lower lobe collapse with superimposed bronchial impaction. There is a small left pleural effusion with intrafissural extension. There are patchy groundglass opacities at the right apex, likely infectious/inflammatory. The patient has redeveloped left lung atelectasis due to mucous plugging - multifactorial  1) tracheobronchomalacia with marked expiratory airway narrowing preventing adequate sputum clearance  2) weak cough following a CVA with marked deconditioning   3) dysphagia with ongoing aspiration of oral secretion    PLAN/RECOMMENDATIONS:    AVAPS for increased work of breathing and hypoxic respiratory failure - transition to a high flow nasal canula as tolerated in the absence of hypercapnia - eventual NIV for sleep to facilitate lung expansion  s/p bronchoscopy 1/6 with removal of copious amounts of purulent mucous from throughout the airways - there was severe tracheobronchomalacia -> cultures are negative  vancomycin/ertapnem  albuterol/atrovent nebs q6h  hypertonic saline and mucomyst nebs to facilitate mucous clearance  chest vest/manual chest PT  cardiac meds: lopressor/norvasc/lipitor  DVT prophylaxis - SQ heparin  allopurinol/keppra/synthroid  glucose control  abdominal/pelvic CT - percutaneous gastrostomy tube present with intraluminal balloon - colonic diverticulosis without diverticulitis - interval removal of the right lower quadrant pigtail catheter in the region of the appendix - there is a residual, multilocular 4.0 x 2.2 cm collection with peripheral enhancement in the region of the previously perforated appendix - periappendiceal fat stranding has largely resolved - consider ID/surgery evaluation    I am not hopeful that the left lung will expand due to the multiple issues delineated above - suspect that she need to be treated with oxygen supplementation to keep saturation greater than 90% accepting the fact that her left lung will be basically non-functional    Thank you for the courtesy of this referral. Plan of care discussed with the patient's son and with Dr. Segundo.    Kennedy Marcano MD, [facility]  [phone]  Pulmonary Medicine       ASSESSMENT:    86 year old gentlewoman, lifelong non-smoker, without history of intrinsic lung disease. 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 She also has a history of HTN, HLD, CAD s/p MI with preserved LVEF and moderate aortic stenosis. The patient was admitted to [place] on December 19th 2021 with fever and abdominal pain. She was found to have perforated appendicitis with abscess formation. She was treated with tube drainage and antibiotics for a polymicrobial infection. She continues on vancomycin via a PICC line. Admission CT had debris within the left lobar and more distal airways of the left lower lobe as well as some debris within the right lower lobe airway - there was complete atelectasis of the left lower lobe and subsegmental atelectatic changes within the left upper lobe and dependent portions of the right lung. The patient developed increased work of breathing, tachycardia, tachypnea and hypoxemia on January 4th due to mucous plugging with complete collapse of the left lung. She underwent bronchoscopy on January 6th with removal of copious amounts of purulent secretions from throughout the bronchial tree - there was severe tracheobronchomalacia. Bronchial cultures were negative. She was treated with bronchodilators, mucolytic nebs, chest vest and nocturnal AVAPS with expansion of the left upper lobe and some reexpansion of the left lower lobe. She was discharged to rehab on January 11th on a 3lpm nasal canula with plans to continue medical and mechanical therapy as well as nocturnal BIPAP. The patient returns from rehab with decreased mental status, dyspnea and hypoxemia treated with 100% NRB. She currently is obtunded on a BIPAP device. CT scan -> partial left upper lobe and complete lingular and left lower lobe collapse with superimposed bronchial impaction - small left pleural effusion with intrafissural extension - patchy groundglass opacities at the right apex. The patient has redeveloped left lung atelectasis due to mucous plugging - multifactorial. There is an area of inflammation/infection in the right upper lobe  1) tracheobronchomalacia with marked expiratory airway narrowing preventing adequate sputum clearance  2) weak cough following a CVA with marked deconditioning   3) dysphagia with ongoing aspiration of oral secretions    PLAN/RECOMMENDATIONS:    AVAPS for increased work of breathing and hypoxic respiratory failure - transition to a high flow nasal canula as tolerated in the absence of hypercapnia - eventual NIV for sleep to facilitate lung expansion  s/p bronchoscopy 1/6 with removal of copious amounts of purulent mucous from throughout the airways - there was severe tracheobronchomalacia -> cultures are negative  vancomycin/ertapenem  albuterol/atrovent nebs q6h  hypertonic saline and mucomyst nebs to facilitate mucous clearance  chest vest/manual chest PT  cardiac meds: lopressor/norvasc/lipitor  DVT prophylaxis - SQ heparin  allopurinol/keppra/synthroid  glucose control  abdominal/pelvic CT - percutaneous gastrostomy tube present with intraluminal balloon - colonic diverticulosis without diverticulitis - interval removal of the right lower quadrant pigtail catheter in the region of the appendix - there is a residual, multilocular 4.0 x 2.2 cm collection with peripheral enhancement in the region of the previously perforated appendix - periappendiceal fat stranding has largely resolved - consider ID/surgery/IR evaluation    I am not hopeful that the left lung will expand due to the multiple issues delineated above - suspect that she will need to be treated with oxygen supplementation to keep saturation greater than 90% accepting the fact that her left lung will be basically non-functional    Thank you for the courtesy of this referral. Plan of care discussed with the patient's son and with Dr. Segundo.    Kennedy Marcano MD, [facility]  [phone]  Pulmonary Medicine       ASSESSMENT:    86 year old gentlewoman, lifelong non-smoker, without history of intrinsic lung disease. 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 She also has a history of HTN, HLD, CAD s/p MI with preserved LVEF and moderate aortic stenosis. The patient was admitted to [place] on December 19th 2021 with fever and abdominal pain. She was found to have perforated appendicitis with abscess formation. She was treated with tube drainage and antibiotics for a polymicrobial infection. She continues on vancomycin via a PICC line. Admission CT had debris within the left lobar and more distal airways of the left lower lobe as well as some debris within the right lower lobe airway - there was complete atelectasis of the left lower lobe and subsegmental atelectatic changes within the left upper lobe and dependent portions of the right lung. The patient developed increased work of breathing, tachycardia, tachypnea and hypoxemia on January 4th due to mucous plugging with complete collapse of the left lung. She underwent bronchoscopy on January 6th with removal of copious amounts of purulent secretions from throughout the bronchial tree - there was severe tracheobronchomalacia. Bronchial cultures were negative. She was treated with bronchodilators, mucolytic nebs, chest vest and nocturnal AVAPS with expansion of the left upper lobe and some reexpansion of the left lower lobe. She was discharged to rehab on January 11th on a 3lpm nasal canula with plans to continue medical and mechanical therapy as well as nocturnal BIPAP. The patient returns from rehab with decreased mental status, dyspnea and hypoxemia treated with 100% NRB. She currently is obtunded on a BIPAP device. CT scan -> partial left upper lobe and complete lingular and left lower lobe collapse with superimposed bronchial impaction - small left pleural effusion with intrafissural extension - patchy groundglass opacities at the right apex. The patient has redeveloped left lung atelectasis due to mucous plugging -> multifactorial. There is an area of inflammation/infection in the right upper lobe  1) tracheobronchomalacia with marked expiratory airway narrowing preventing adequate sputum clearance  2) weak cough following a CVA with marked deconditioning   3) dysphagia with ongoing aspiration of oral secretions    PLAN/RECOMMENDATIONS:    AVAPS for increased work of breathing and hypoxic respiratory failure - transition to a high flow nasal canula as tolerated in the absence of hypercapnia - eventual NIV for sleep to facilitate lung expansion  s/p bronchoscopy 1/6 with removal of copious amounts of purulent mucous from throughout the airways - there was severe tracheobronchomalacia -> cultures are negative  vancomycin/ertapenem  albuterol/atrovent nebs q6h  hypertonic saline and mucomyst nebs to facilitate mucous clearance  chest vest/manual chest PT  cardiac meds: lopressor/norvasc/lipitor  DVT prophylaxis - SQ heparin  allopurinol/keppra/synthroid  glucose control  abdominal/pelvic CT - percutaneous gastrostomy tube present with intraluminal balloon - colonic diverticulosis without diverticulitis - interval removal of the right lower quadrant pigtail catheter in the region of the appendix - there is a residual, multilocular 4.0 x 2.2 cm collection with peripheral enhancement in the region of the previously perforated appendix - periappendiceal fat stranding has largely resolved - consider ID/surgery/IR evaluation    I am not hopeful that the left lung will expand due to the multiple issues delineated above - suspect that she will need to be treated with oxygen supplementation to keep saturation greater than 90% accepting the fact that her left lung will be basically non-functional    Thank you for the courtesy of this referral. Plan of care discussed with the patient's son Luis by phone and with Dr. Segundo.    Kennedy Marcano MD, [facility]  [phone]  Pulmonary Medicine

## 2022-01-20 NOTE — H&P ADULT - HISTORY OF PRESENT ILLNESS
87 y/o female,   hx of CVA, MI, breast CA, COPD, not on home 02,  s/p  recent  perforated  appendox/ abscess,  HN, HLD, hypothyroid,  obexity, relatively  be d boind, , gout   BIBEMS from [facility] due to SOB., started this morning   patient initial 02 sat was 84% placed on NRB and improved to 93%.      has a pic line to left arm ,  and  peg tube   . patient AOx3.     denies f/n/v/d, CP, dizziness, HA.

## 2022-01-20 NOTE — ED PROVIDER NOTE - OBJECTIVE STATEMENT
87 y/o female, hx of CVA, MI, COPD, not on home 02, BIBEMS from [facility] due to SOB. states started this morning, sudden onset. states patient initial 02 sat was 84% placed on NRB and improved to 93%. of note patient has a pic line to left arm as well as peg tube. patient AOx3. denies f/n/v/d, CP, dizziness, HA. 85 y/o female, hx of CVA, MI, breast CA, COPD, not on home 02, BIBEMS from [facility] due to SOB. states started this morning, sudden onset. states patient initial 02 sat was 84% placed on NRB and improved to 93%. patient had recent admission. of note patient has a pic line to left arm as well as peg tube. patient AOx3. denies f/n/v/d, CP, dizziness, HA.

## 2022-01-20 NOTE — CONSULT NOTE ADULT - CONSULT REASON
acute hypoxic respiratory failure; mucous plugging left lung atelectasis; weak cough; dysphagia; PEG; h/o CVA acute hypoxic respiratory failure; mucous plugging; left lung atelectasis; weak cough; dysphagia; tracheobronchomalacia; PEG; h/o CVA

## 2022-01-20 NOTE — ED PROVIDER NOTE - BREATH SOUNDS
b/l Rhonchi appreciated. patient able to speak and carry and conversation. + use of accessory muscles./RHONCHI

## 2022-01-20 NOTE — CONSULT NOTE ADULT - ASSESSMENT
86F perforated appendicitis s/p IR drain and removal, now with increased RLQ collection c/f abscess  surgery consulted    Recommendations:  -agree with continuing abx  -poor surgical candidate at this time (on BIPAP)  will follow    Discussed with Dr. Rambo [facility]  [id number] 86F perforated appendicitis s/p IR drain and removal, now with increased RLQ collection c/f abscess  surgery consulted    Recommendations:  -agree with continuing abx  -poor surgical candidate at this time (on BIPAP)    Discussed with Dr. Rambo [facility]  [id number] 86F perforated appendicitis s/p IR drain and removal, now with increased RLQ collection c/f abscess  surgery consulted    Recommendations:  -agree with continuing abx  -appreciate IR recommendations for RLQ collection  -poor surgical candidate at this time (on BIPAP)    Discussed with Dr. Rambo [facility]  [id number] 86F perforated appendicitis s/p IR drain and removal, now with increased RLQ collection c/f abscess  surgery consulted    Recommendations:  -agree with continuing abx  -appreciate IR recommendations for RLQ collection  -poor surgical candidate at this time (on BIPAP)  -consider GI for choledocholithiasis seen 12/2021 as well as 1/20 CT    Discussed with Dr. Rambo [facility]  [id number]

## 2022-01-20 NOTE — ED ADULT NURSE NOTE - OBJECTIVE STATEMENT
85 yo F bib ems for acute respiratory distress sent from [facility] rehab. pt c/o of sob. As per EMS pt 02 sat 84% ra placed on NRB now o2 sat 93-95 % on 15 L.

## 2022-01-20 NOTE — H&P ADULT - ASSESSMENT
86   year old female    h/o hemorrhagic CVA, has  PEG,  HTN, MI,   HLD, gout   right Ca breast. s/p mastectomy in 2019,  s/p  sentinel node  localization.  4/4,  were  negative  peg dislodged.  IR   replacements  on 1/3/22   picc  and iv ab  for 6 weeks, per  ID, on  last  visit  s/p rrt   for hypoxia. cxr with complete  collapsed  of  left lung/  pulm d r clarke   s/ p  bronchoscopy , pt  with  tracheomalacia  and  collapsed  airways,  makes  this a  difficult  situation, as there is no good rx for this          *  p/w SOB     from left   lung  collapse  afberile,  with normal  wbc  on arrivall   *   s/p cva, has  PEG,  npo/,  on  synthroid, Keppra  ,  *  HTN, on meds  per  card  prior  echo,  normal ef  *  h/o ca breast. /, s/p  R  mastectomy  * obesity, bmi is  41  *   h/o bacteremia. / staph epi, meth R,  from perforated  appendicitis  ct  c/a/p, from last  visit   pna, perforated  appendicitis,  ?  mets  to  acetabulum   surg/ IR  and  oncology eval dr pacheco   to  f/p,     and  also, with  prior ,  echo, vegetation on  aortic  valve/ endocarditis,     esdras,  may  not  change   and  per  card, pt is  at  high risk  for  esdras    ct   RLL  phlegmon,  and  80  cc of  pus  drained by  IR . c/s  with  ecoli, e coccus  and  sophia  per card , pt high risk for esdras  and given that . this will not alter rx. pt  will need   extended  course  of ab     on iv  vanco and ertapenem.  till  2/9/.22  ID d r merary, surg  and  IR  eval  CT  1/20/22, no PE. collection in right side   pt  with  tracheomalacia  and  collapsed  airways,  makes  this a  difficult  situation, as there is no good rx for this  picc  line  to  be evaluate d by picc  team,  prior to use  on dvt  ppx       per  son, pt is  full code     rad< from: CT Angio Chest PE Protocol w/ IV Cont (01.20.22 @ 10:35) >  IMPRESSION:  Limited exam for pulmonary embolism; no central pulmonary embolism with   nondiagnostic evaluation of lobar, segmental and subsegmental branches.   No imaging evidence of right heart strain.  Complete lingular and left lower lobe collapse withsuperimposed   bronchial impaction. Small left pleural effusion. Mild patchy right   apical groundglass may be related to pulmonary edema, infection or   inflammation.  Multilocular 4.0 x 2.2 cm right lower quadrant fluid collection in the   regionof previously perforated appendix, status post drain removal.   Periappendiceal fat stranding has largely resolved.  Left PICC crosses midline and terminates in the right innominate vein,   likely repositioned during the administration of contrast between    imaging and CTA acquisition.  Cholelithiasis and choledocholithiasis. No intrahepatic biliary ductal   dilatation.  --- End of Report ---  < end of copied text >                    86   year old female    h/o hemorrhagic CVA, has  PEG,  HTN, MI,   HLD, gout, right ca  breast   right Ca breast. s/p mastectomy in 2019,  s/p  sentinel node  localization.  4/4,  were  negative  peg dislodged.  IR   replacements  on 1/3/22   picc  and iv ab  for 6 weeks, per  ID, on  last  visit  s/p rrt   for hypoxia. cxr with complete  collapsed  of  left lung, needing broch, by  puledson mir   s/ p  bronchoscopy , pt  with  tracheomalacia  and  collapsed  airways,  makes  this a  difficult  situation, as there is no good rx for this          *  p/w   sob. acute  resp failure  on Bipap,  from left   lung  collapse  also  on ct, with  ?  infection  afberile,  with normal  wbc  on arrival  *   s/p cva, has  PEG,  npo  ,  on  synthroid, Keppra  ,  *  HTN, on meds  per  card  *  h/o ca breast. /, s/p  R  mastectomy  * obesity, bmi  was   41, now  is  34 in  er  *   h/o bacteremia. on 12/19/21,  Staph epi, meth R,  from perforated  appendicitis  ct  c/a/p, from last  visit   pna, perforated  appendicitis,  ?  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 this will not alter rx. pt  will need   extended  course  of ab     on iv  vanco and ertapenem.  till  2/9/.22  ID dr reagan, surg  and  IR  eval  CT  1/20/22, no PE. collection in right side   pt  with  h/o  tracheomalacia  and  collapsed  airways,  makes  this a  difficult  situation, as there is no good rx for this  picc  line  to  be evaluate d by picc  team,  prior to use  on dvt  ppx   puledson mir, on Proventil,  Mucomyst and   saline  nebs.         per  son, pt is  full code     rad< from: CT Angio Chest PE Protocol w/ IV Cont (01.20.22 @ 10:35) >  IMPRESSION:  Limited exam for pulmonary embolism; no central pulmonary embolism with   nondiagnostic evaluation of lobar, segmental and subsegmental branches.   No imaging evidence of right heart strain.  Complete lingular and left lower lobe collapse withsuperimposed   bronchial impaction. Small left pleural effusion. Mild patchy right   apical groundglass may be related to pulmonary edema, infection or   inflammation.  Multilocular 4.0 x 2.2 cm right lower quadrant fluid collection in the   regionof previously perforated appendix, status post drain removal.   Periappendiceal fat stranding has largely resolved.  Left PICC crosses midline and terminates in the right innominate vein,   likely repositioned during the administration of contrast between    imaging and CTA acquisition.  Cholelithiasis and choledocholithiasis. No intrahepatic biliary ductal   dilatation.  --- End of Report ---  < end of copied text >                    86   year old female    h/o hemorrhagic CVA, has  PEG,  HTN, MI,   HLD, gout, right ca  breast   right Ca breast. s/p mastectomy in 2019,  s/p  sentinel node  localization.  4/4,  were  negative  peg dislodged.  IR   replacements  on 1/3/22   picc  and iv ab  for 6 weeks, per  ID, on  last  visit  s/p rrt   for hypoxia. cxr with complete  collapsed  of  left lung, needing broch, by  puledson mir   s/ p  bronchoscopy , pt  with  tracheomalacia  and  collapsed  airways,  makes  this a  difficult  situation, as there is no good rx for this          *  p/w   sob. acute  resp failure  on Bipap,  from left   lung  collapse  also  on ct, with  ?  infection  afberile,  with normal  wbc  on arrival  *   s/p cva, has  PEG,  npo  ,  on  synthroid, Keppra  ,  *  HTN, on meds  per  card  *  h/o ca breast. /, s/p  R  mastectomy  * obesity, bmi  was   41, now  is  34 in  er  *   h/o bacteremia. on 12/19/21,  Staph epi, meth R,  from perforated  appendicitis  ct  c/a/p, from last  visit   pna, perforated  appendicitis,  ?  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 this will not alter rx. pt  will need   extended  course  of ab     on iv  vanco and ertapenem.  till  2/9/.22  ID dr reagan, surg  and  IR  eval  CT  1/20/22, no PE. collection in right side    picc  line  to  be evaluate d by picc  team,  prior to use  on dvt  ppx   puledson mir, on Proventil,  Mucomyst and   saline  nebs.  pt  with  h/o  tracheomalacia  and  collapsed  airways,  makes  this a  difficult  situation, as there is no good rx for this  given pt;s  mlple co morbidities,  pt is  at risk for  re admissions       per  son, pt is  full code     rad< from: CT Angio Chest PE Protocol w/ IV Cont (01.20.22 @ 10:35) >  IMPRESSION:  Limited exam for pulmonary embolism; no central pulmonary embolism with   nondiagnostic evaluation of lobar, segmental and subsegmental branches.   No imaging evidence of right heart strain.  Complete lingular and left lower lobe collapse withsuperimposed   bronchial impaction. Small left pleural effusion. Mild patchy right   apical groundglass may be related to pulmonary edema, infection or   inflammation.  Multilocular 4.0 x 2.2 cm right lower quadrant fluid collection in the   regionof previously perforated appendix, status post drain removal.   Periappendiceal fat stranding has largely resolved.  Left PICC crosses midline and terminates in the right innominate vein,   likely repositioned during the administration of contrast between    imaging and CTA acquisition.  Cholelithiasis and choledocholithiasis. No intrahepatic biliary ductal   dilatation.  --- End of Report ---  < end of copied text >

## 2022-01-20 NOTE — CONSULT NOTE ADULT - ATTENDING COMMENTS
Pt with multiple comorbidities.  Poor historian.  No peritoneal findings on abdominal exam.   CT with minimal increase in size of the RLQ abscess (4 cm, it was 3.3 cm on 12/30 at the time of the IR drain removal).  Agree with a course of ABX, duration as per ID.  She is a very poor surgical candidate.   Recommend GI consult for opinion on the choledocholithiasis (if not done yet). Pt with multiple comorbidities including major pulmonary problems.  Poor historian.  No peritoneal findings on abdominal exam.   CT with minimal increase in size of the RLQ abscess (4 cm, it was 3.3 cm on 12/30 at the time of the IR drain removal).  Agree with a course of ABX, duration as per ID.  She is a very poor surgical candidate.   Recommend GI consult for opinion on the choledocholithiasis.

## 2022-01-20 NOTE — CONSULT NOTE ADULT - SUBJECTIVE AND OBJECTIVE BOX
CHIEF COMPLAINT:Patient is a 86y old  Female who presents with a chief complaint of sob (20 Jan 2022 17:34)      HPI:   85 y/o female, hx of CVA, MI, breast CA, COPD, not on home 02, s/p  recent  perforated  appendix / abscess,  HN, HLD, hypothyroid,  obesity relatively  bed bound , gout   BIBEMS from [facility] due to SOB., started this morning   patient initial 02 sat was 84% placed on NRB and improved to 93%.  has a pic line to left arm ,  and  peg tube  pt had a long course of admission sec to mucus plug and collapsed lung, s/p bronchoscopy.      PAST MEDICAL & SURGICAL HISTORY:  MI (myocardial infarction)    HTN (hypertension)    Personal history of asbestosis    Gout    Dyslipidemia    UTI (lower urinary tract infection)    ETOH abuse    CVA (cerebral vascular accident)    History of left shoulder fracture    History of benign breast tumor        MEDICATIONS  (STANDING):  acetylcysteine 20%  Inhalation 4 milliLiter(s) Inhalation every 6 hours  albuterol/ipratropium for Nebulization 3 milliLiter(s) Nebulizer every 6 hours  allopurinol 100 milliGRAM(s) Oral daily  amLODIPine   Tablet 10 milliGRAM(s) Oral daily  atorvastatin 20 milliGRAM(s) Oral at bedtime  ertapenem  IVPB 1000 milliGRAM(s) IV Intermittent every 24 hours  guaiFENesin Oral Liquid (Sugar-Free) 300 milliGRAM(s) Oral every 6 hours  heparin SubCutaneous Injection - Peds 5000 Unit(s) SubCutaneous every 8 hours  levETIRAcetam  Solution 750 milliGRAM(s) Oral two times a day  levothyroxine 75 MICROGram(s) Oral daily  metoprolol tartrate 25 milliGRAM(s) Oral two times a day  sodium chloride 3%  Inhalation 4 milliLiter(s) Inhalation every 6 hours  vancomycin  IVPB 1000 milliGRAM(s) IV Intermittent every 24 hours    MEDICATIONS  (PRN):      FAMILY HISTORY:  No pertinent family history in first degree relatives        SOCIAL HISTORY:    [ ] Non-smoker  [ ] Smoker  [ ] Alcohol    Allergies    Toradol (Urticaria (Mild to Mod); Rash (Mild to Mod))    Intolerances    	    REVIEW OF SYSTEMS:  CONSTITUTIONAL: No fever, weight loss, or fatigue  EYES: No eye pain, visual disturbances, or discharge  ENT:  No difficulty hearing, tinnitus, vertigo; No sinus or throat pain  NECK: No pain or stiffness  RESPIRATORY: No cough, wheezing, chills or hemoptysis; + Shortness of Breath  CARDIOVASCULAR: No chest pain, palpitations, passing out, dizziness, or leg swelling  GASTROINTESTINAL: No abdominal or epigastric pain. No nausea, vomiting, or hematemesis; No diarrhea or constipation. No melena or hematochezia.  GENITOURINARY: No dysuria, frequency, hematuria, or incontinence  NEUROLOGICAL: No headaches, memory loss, loss of strength, numbness, or tremors  SKIN: No itching, burning, rashes, or lesions   LYMPH Nodes: No enlarged glands  ENDOCRINE: No heat or cold intolerance; No hair loss  MUSCULOSKELETAL: No joint pain or swelling; No muscle, back, or extremity pain  PSYCHIATRIC: No depression, anxiety, mood swings, or difficulty sleeping  HEME/LYMPH: No easy bruising, or bleeding gums  ALLERGY AND IMMUNOLOGIC: No hives or eczema	    [ ] All others negative	  [ ] Unable to obtain    PHYSICAL EXAM:  T(C): 36.7 (01-20-22 @ 15:00), Max: 36.8 (01-20-22 @ 09:07)  HR: 101 (01-20-22 @ 15:55) (88 - 109)  BP: 118/70 (01-20-22 @ 15:00) (116/49 - 128/70)  RR: 20 (01-20-22 @ 15:00) (20 - 24)  SpO2: 95% (01-20-22 @ 15:55) (91% - 98%)  Wt(kg): --  I&O's Summary      Appearance: Normal	  HEENT:   Normal oral mucosa, PERRL, EOMI	  Lymphatic: No lymphadenopathy  Cardiovascular: Normal S1 S2, No JVD, + murmurs, No edema  Respiratory: rhonchi  Gastrointestinal:  Soft, Non-tender, + BS	  Skin: No rashes, No ecchymoses, No cyanosis	  Neurologic: Non-focal  Extremities: Normal range of motion, No clubbing, cyanosis or edema  Vascular: Peripheral pulses palpable 2+ bilaterally    TELEMETRY: 	    ECG:  	  RADIOLOGY:  OTHER: 	  	  LABS:	 	    CARDIAC MARKERS:                              9.6    9.83  )-----------( 183      ( 20 Jan 2022 08:53 )             32.3     01-20    141  |  103  |  32<H>  ----------------------------<  188<H>  4.9   |  24  |  0.62    Ca    9.9      20 Jan 2022 08:53    TPro  7.5  /  Alb  3.8  /  TBili  0.3  /  DBili  x   /  AST  29  /  ALT  16  /  AlkPhos  105  01-20    proBNP: Serum Pro-Brain Natriuretic Peptide: 148 pg/mL (01-20 @ 08:53)    Lipid Profile:   HgA1c:   TSH:   PT/INR - ( 20 Jan 2022 08:53 )   PT: 12.0 sec;   INR: 1.00 ratio         PTT - ( 20 Jan 2022 08:53 )  PTT:31.0 sec    PREVIOUS DIAGNOSTIC TESTING:    < from: 12 Lead ECG (01.20.22 @ 08:25) >  Diagnosis Line SINUS TACHYCARDIA  LEFT AXIS DEVIATION  INFERIOR INFARCT (CITED ON OR BEFORE 20-OCT-2018)  CANNOT RULE OUT ANTERIOR INFARCT , AGE UNDETERMINED  ABNORMAL ECG  WHEN COMPARED WITH ECG OF 24-DEC-2021 08:28,  NO SIGNIFICANT CHANGE WAS FOUND    < from: Transthoracic Echocardiogram (12.21.21 @ 14:39) >  Mitral Valve: Mitral annular calcification. Minimal mitral  regurgitation.  Aortic Valve/Aorta: Moderate aortic stenosis:  ---LVOTd 2.14 cm. VTI 18.0 cm.  ---Aortic valve VTI50.4 cm. Mean gradient 13.7 mmHg. DI =  0.36.  ---Aortic valve area by continuity 1.3 cm2.  There may be a vegetation on the left or non-coronary cusp.  Normal aortic root size.  Left Atrium: Normal left atrium.  Left Ventricle: Normal left ventricular internal dimensions  and wall thickness.  Normal left ventricular systolic function. No segmental  wall motion abnormalities. Indeterminate diastolic  function.  Right Heart: Normal right atrium. Normal right ventricular  size and function. Tricuspid valve not well visualized.  Minimal tricuspid regurgitation. Pulmonic valve not well  visualized. No pulmonic regurgitation.  Pericardium/Pleura: Normal pericardium with no pericardial  effusion.  Hemodynamic: No evidence of pulmonary hypertension.  ------------------------------------------------------------------------  Conclusions:  Normal left ventricular systolic function. No segmental  wall motion abnormalities.  Moderate aortic stenosis.  There may be a vegetation on the left or non-coronary cusp  of the aortic valve. Consider transesophageal  echocardiography.    < from: CT Angio Chest PE Protocol w/ IV Cont (01.20.22 @ 10:35) >  Limited exam for pulmonary embolism; no central pulmonary embolism with   nondiagnostic evaluation of lobar, segmental and subsegmental branches.   No imaging evidence of right heart strain.    Complete lingular and left lower lobe collapse withsuperimposed   bronchial impaction. Small left pleural effusion. Mild patchy right   apical groundglass may be related to pulmonary edema, infection or   inflammation.    Multilocular 4.0 x 2.2 cm right lower quadrant fluid collection in the   regionof previously perforated appendix, status post drain removal.   Periappendiceal fat stranding has largely resolved.    Left PICC crosses midline and terminates in the right innominate vein,   likely repositioned during the administration of contrast between    imaging and CTA acquisition.    Cholelithiasis and choledocholithiasis. No intrahepatic biliary ductal   dilatation.    -86y Female with RLQ abscess s/p IR drain, resolved abscess, drain removed, now back in ED with reaccumulation of abscess.   -Too small for drainage at this time. Recommend conservative management. Re-image if no clinical improvement.   AVAPS for increased work of breathing and hypoxic respiratory failure - transition to a high flow nasal canula as tolerated in the absence of hypercapnia - eventual NIV for sleep to facilitate lung expansion  s/p bronchoscopy 1/6 with removal of copious amounts of purulent mucous from throughout the airways - there was severe tracheobronchomalacia -> cultures are negative  vancomycin/ertapenem  albuterol/atrovent nebs q6h  hypertonic saline and mucomyst nebs to facilitate mucous clearance  chest vest/manual chest PT  cardiac meds: lopressor/norvasc/lipitor  DVT prophylaxis - SQ heparin  allopurinol/keppra/synthroid  glucose control  abdominal/pelvic CT - percutaneous gastrostomy tube present with intraluminal balloon - colonic diverticulosis without diverticulitis - interval removal of the right lower quadrant pigtail catheter in the region of the appendix - there is a residual, multilocular 4.0 x 2.2 cm collection with peripheral enhancement in the region of the previously perforated appendix - periappendiceal fat stranding has largely resolved - consider ID/surgery/IR evaluation

## 2022-01-20 NOTE — CONSULT NOTE ADULT - SUBJECTIVE AND OBJECTIVE BOX
Interventional Radiology  Evaluate for Procedure: RLQ abscess drain    HPI: 86y Female with RLQ abscess s/p IR drain, resolved abscess, drain removed, now back in ED with reaccumulation of abscess.     Allergies: Toradol (Urticaria (Mild to Mod); Rash (Mild to Mod))  Medications (Abx/Cardiac/Anticoagulation/Blood Products)  ertapenem  IVPB: 120 mL/Hr IV Intermittent (01-20 @ 14:46)    Data:  162.6  90.7  T(C): 36.8  HR: 95  BP: 128/70  RR: 21  SpO2: 95%  -WBC 9.83 / HgB 9.6 / Hct 32.3 / Plt 183  -Na 141 / Cl 103 / BUN 32 / Glucose 188  -K 4.9 / CO2 24 / Cr 0.62  -ALT 16 / Alk Phos 105 / T.Bili 0.3  -INR 1.00 / PTT 31.0    Radiology: Small RLQ abscess less than 4cm    Assessment/Plan:   -86y Female with RLQ abscess s/p IR drain, resolved abscess, drain removed, now back in ED with reaccumulation of abscess.   -Too small for drainage at this time. Recommend conservative management. Re-image if no clinical improvement. D/w Dr. Hernández

## 2022-01-20 NOTE — H&P ADULT - NSHPLABSRESULTS_GEN_ALL_CORE
LABS:                        9.6    9.83  )-----------( 183      ( 20 Jan 2022 08:53 )             32.3     01-20    141  |  103  |  32<H>  ----------------------------<  188<H>  4.9   |  24  |  0.62    Ca    9.9      20 Jan 2022 08:53    TPro  7.5  /  Alb  3.8  /  TBili  0.3  /  DBili  x   /  AST  29  /  ALT  16  /  AlkPhos  105  01-20    PT/INR - ( 20 Jan 2022 08:53 )   PT: 12.0 sec;   INR: 1.00 ratio         PTT - ( 20 Jan 2022 08:53 )  PTT:31.0 sec            01-20 @ 08:53  4.5  52      Thyroid Stimulating Hormone, Serum: 2.39 uIU/mL (01-07 @ 09:13)

## 2022-01-20 NOTE — CONSULT NOTE ADULT - ASSESSMENT
85 y/o female, hx of CVA, MI, breast CA, COPD, not on home 02, s/p  recent  perforated  appendix / abscess,  HN, HLD, hypothyroid,  obesity relatively  bed bound , gout   BIBEMS from [facility] due [facility] SOB., started this morning   patient initial 02 sat was 84% placed on NRB and improved to 93%.  has a pic line to left arm ,  and  peg tube  pt had a long course of admission sec to mucus plug and collapsed lung, s/p bronchoscopy.  ct angio of the chest noted, sig abnormality with collapse of the lung  small pleural effusion, doubt chf  agree with iv abx  dvt prophylaxis  pulmonary noted

## 2022-01-20 NOTE — H&P ADULT - NSHPPHYSICALEXAM_GEN_ALL_CORE
PHYSICAL EXAMINATION:  Vital Signs Last 24 Hrs  T(C): 36.8 (20 Jan 2022 09:07), Max: 36.8 (20 Jan 2022 09:07)  T(F): 98.3 (20 Jan 2022 09:07), Max: 98.3 (20 Jan 2022 09:07)  HR: 95 (20 Jan 2022 13:00) (95 - 109)  BP: 128/70 (20 Jan 2022 13:00) (116/49 - 128/70)  BP(mean): --  RR: 21 (20 Jan 2022 13:00) (20 - 24)  SpO2: 95% (20 Jan 2022 13:00) (91% - 98%)  CAPILLARY BLOOD GLUCOSE            GENERAL: NAD, well-groomed,  HEAD:  atraumatic, normocephalic  EYES: sclera anicteric  ENMT: mucous membranes moist  NECK: supple, No JVD  CHEST/LUNG:   dcerased  b/sounds, bases  HEART: normal S1, S2  ABDOMEN: BS+, soft, ND, NT   EXTREMITIES:    no    edema    b/l LEs  NEURO: awake, ,     moves all extremities  SKIN: no     rash  picc line

## 2022-01-20 NOTE — CHART NOTE - NSCHARTNOTEFT_GEN_A_CORE
Advanced Care Planning:  I have had goals of care discussion, advanced directive and code status with patient/family    and  in  coordinating   patient care.     all questions answered and expressed understanding of the plan.    per  son,  pt is full code  son is  very familiar  with pt;s  extensive  history

## 2022-01-21 NOTE — PROGRESS NOTE ADULT - SUBJECTIVE AND OBJECTIVE BOX
afberile  REVIEW OF SYSTEMS:  GEN: no fever,    no chills  RESP: no SOB,   no cough  CVS: no chest pain,   no palpitations  GI: no abdominal pain,   no nausea,   no vomiting,   no constipation,   no diarrhea  : no dysuria,   no frequency  NEURO: no headache,   no dizziness  PSYCH: no depression,   not anxious  Derm : no rash    MEDICATIONS  (STANDING):  acetylcysteine 20%  Inhalation 4 milliLiter(s) Inhalation every 6 hours  albuterol/ipratropium for Nebulization 3 milliLiter(s) Nebulizer every 6 hours  allopurinol 100 milliGRAM(s) Oral daily  amLODIPine   Tablet 10 milliGRAM(s) Oral daily  atorvastatin 20 milliGRAM(s) Oral at bedtime  ertapenem  IVPB 1000 milliGRAM(s) IV Intermittent every 24 hours  guaiFENesin Oral Liquid (Sugar-Free) 300 milliGRAM(s) Oral every 6 hours  heparin SubCutaneous Injection - Peds 5000 Unit(s) SubCutaneous every 8 hours  levETIRAcetam  Solution 750 milliGRAM(s) Oral two times a day  levothyroxine 75 MICROGram(s) Oral daily  metoprolol tartrate 25 milliGRAM(s) Oral two times a day  sodium chloride 3%  Inhalation 4 milliLiter(s) Inhalation every 6 hours  vancomycin  IVPB 1000 milliGRAM(s) IV Intermittent every 24 hours    MEDICATIONS  (PRN):      Vital Signs Last 24 Hrs  T(C): 36.4 (21 Jan 2022 04:00), Max: 36.9 (20 Jan 2022 18:00)  T(F): 97.6 (21 Jan 2022 04:00), Max: 98.5 (20 Jan 2022 18:00)  HR: 103 (21 Jan 2022 05:12) (71 - 109)  BP: 109/63 (21 Jan 2022 04:00) (109/63 - 128/70)  BP(mean): --  RR: 18 (21 Jan 2022 04:00) (18 - 24)  SpO2: 97% (21 Jan 2022 05:12) (85% - 98%)  CAPILLARY BLOOD GLUCOSE        I&O's Summary    20 Jan 2022 07:01  -  21 Jan 2022 07:00  --------------------------------------------------------  IN: 0 mL / OUT: 500 mL / NET: -500 mL        PHYSICAL EXAM:  HEAD:  Atraumatic, Normocephalic  NECK: Supple, No   JVD  CHEST/LUNG:   no     rales,     no,    rhonchi  HEART: Regular rate and rhythm;         murmur  ABDOMEN: Soft, Nontender, ;   EXTREMITIES:    no   edema  NEUROLOGY:  alert    LABS:                        9.6    9.83  )-----------( 183      ( 20 Jan 2022 08:53 )             32.3     01-20    141  |  103  |  32<H>  ----------------------------<  188<H>  4.9   |  24  |  0.62    Ca    9.9      20 Jan 2022 08:53    TPro  7.5  /  Alb  3.8  /  TBili  0.3  /  DBili  x   /  AST  29  /  ALT  16  /  AlkPhos  105  01-20    PT/INR - ( 20 Jan 2022 08:53 )   PT: 12.0 sec;   INR: 1.00 ratio         PTT - ( 20 Jan 2022 08:53 )  PTT:31.0 sec      Urinalysis Basic - ( 20 Jan 2022 14:56 )    Color: Yellow / Appearance: Slightly Turbid / SG: >1.050 / pH: x  Gluc: x / Ketone: Negative  / Bili: Negative / Urobili: Negative   Blood: x / Protein: 100 / Nitrite: Negative   Leuk Esterase: Large / RBC: 7 /hpf / WBC 25 /HPF   Sq Epi: x / Non Sq Epi: 5 /hpf / Bacteria: Few          01-20 @ 08:53  4.5  52      Thyroid Stimulating Hormone, Serum: 2.39 uIU/mL (01-07 @ 09:13)          Consultant(s) Notes Reviewed:      Care Discussed with Consultants/Other Providers:

## 2022-01-21 NOTE — PROGRESS NOTE ADULT - SUBJECTIVE AND OBJECTIVE BOX
CARDIOLOGY     PROGRESS  NOTE   ________________________________________________    CHIEF COMPLAINT:Patient is a 86y old  Female who presents with a chief complaint of sob (21 Jan 2022 07:15)  sob on o2.  	  REVIEW OF SYSTEMS:  CONSTITUTIONAL: No fever, weight loss, or fatigue  EYES: No eye pain, visual disturbances, or discharge  ENT:  No difficulty hearing, tinnitus, vertigo; No sinus or throat pain  NECK: No pain or stiffness  RESPIRATORY: No cough, wheezing, chills or hemoptysis; + Shortness of Breath  CARDIOVASCULAR: No chest pain, palpitations, passing out, dizziness, or leg swelling  GASTROINTESTINAL: No abdominal or epigastric pain. No nausea, vomiting, or hematemesis; No diarrhea or constipation. No melena or hematochezia.  GENITOURINARY: No dysuria, frequency, hematuria, or incontinence  NEUROLOGICAL: No headaches, memory loss, loss of strength, numbness, or tremors  SKIN: No itching, burning, rashes, or lesions   LYMPH Nodes: No enlarged glands  ENDOCRINE: No heat or cold intolerance; No hair loss  MUSCULOSKELETAL: No joint pain or swelling; No muscle, back, or extremity pain  PSYCHIATRIC: No depression, anxiety, mood swings, or difficulty sleeping  HEME/LYMPH: No easy bruising, or bleeding gums  ALLERGY AND IMMUNOLOGIC: No hives or eczema	    [ ] All others negative	  [ ] Unable to obtain    PHYSICAL EXAM:  T(C): 36.4 (01-21-22 @ 04:00), Max: 36.9 (01-20-22 @ 18:00)  HR: 103 (01-21-22 @ 05:12) (71 - 107)  BP: 109/63 (01-21-22 @ 04:00) (109/63 - 128/70)  RR: 18 (01-21-22 @ 04:00) (18 - 21)  SpO2: 97% (01-21-22 @ 05:12) (85% - 98%)  Wt(kg): --  I&O's Summary    20 Jan 2022 07:01  -  21 Jan 2022 07:00  --------------------------------------------------------  IN: 720 mL / OUT: 900 mL / NET: -180 mL        Appearance: Normal	  HEENT:   Normal oral mucosa, PERRL, EOMI	  Lymphatic: No lymphadenopathy  Cardiovascular: Normal S1 S2, No JVD, + murmurs, No edema  Respiratory: decrease bs bl  Psychiatry: A & O x 3, Mood & affect appropriate  Gastrointestinal:  Soft, Non-tender, + BS	  Skin: No rashes, No ecchymoses, No cyanosis	  Neurologic: Non-focal  Extremities: Normal range of motion, No clubbing, cyanosis or edema  Vascular: Peripheral pulses palpable 2+ bilaterally    MEDICATIONS  (STANDING):  acetylcysteine 20%  Inhalation 4 milliLiter(s) Inhalation every 6 hours  albuterol/ipratropium for Nebulization 3 milliLiter(s) Nebulizer every 6 hours  allopurinol 100 milliGRAM(s) Oral daily  amLODIPine   Tablet 10 milliGRAM(s) Oral daily  atorvastatin 20 milliGRAM(s) Oral at bedtime  ertapenem  IVPB 1000 milliGRAM(s) IV Intermittent every 24 hours  guaiFENesin Oral Liquid (Sugar-Free) 300 milliGRAM(s) Oral every 6 hours  heparin SubCutaneous Injection - Peds 5000 Unit(s) SubCutaneous every 8 hours  levETIRAcetam  Solution 750 milliGRAM(s) Oral two times a day  levothyroxine 75 MICROGram(s) Oral daily  metoprolol tartrate 25 milliGRAM(s) Oral two times a day  sodium chloride 3%  Inhalation 4 milliLiter(s) Inhalation every 6 hours  vancomycin  IVPB 1000 milliGRAM(s) IV Intermittent every 24 hours      TELEMETRY: 	    ECG:  	  RADIOLOGY:  OTHER: 	  	  LABS:	 	    CARDIAC MARKERS:                                9.6    9.83  )-----------( 183      ( 20 Jan 2022 08:53 )             32.3     01-20    141  |  103  |  32<H>  ----------------------------<  188<H>  4.9   |  24  |  0.62    Ca    9.9      20 Jan 2022 08:53    TPro  7.5  /  Alb  3.8  /  TBili  0.3  /  DBili  x   /  AST  29  /  ALT  16  /  AlkPhos  105  01-20    proBNP: Serum Pro-Brain Natriuretic Peptide: 148 pg/mL (01-20 @ 08:53)    Lipid Profile:   HgA1c:   TSH: Thyroid Stimulating Hormone, Serum: 2.39 uIU/mL (01-07 @ 09:13)    PT/INR - ( 20 Jan 2022 08:53 )   PT: 12.0 sec;   INR: 1.00 ratio         PTT - ( 20 Jan 2022 08:53 )  PTT:31.0 sec  < from: 12 Lead ECG (01.20.22 @ 08:25) >  Diagnosis Line SINUS TACHYCARDIA  LEFT AXIS DEVIATION  INFERIOR INFARCT (CITED ON OR BEFORE 20-OCT-2018)  CANNOT RULE OUT ANTERIOR INFARCT , AGE UNDETERMINED  ABNORMAL ECG  WHEN COMPARED WITH ECG OF 24-DEC-2021 08:28,  NO SIGNIFICANT CHANGE WAS FOUND      Assessment and plan  ---------------------------   87 y/o female, hx of CVA, MI, breast CA, COPD, not on home 02, s/p  recent  perforated  appendix / abscess,  HN, HLD, hypothyroid,  obesity relatively  bed bound , gout   BIBEMS from [facility] due to SOB., started this morning   patient initial 02 sat was 84% placed on NRB and improved to 93%.  has a pic line to left arm ,  and  peg tube  pt had a long course of admission sec to mucus plug and collapsed lung, s/p bronchoscopy.  ct angio of the chest noted, sig abnormality with collapse of the lung  small pleural effusion, doubt chf  agree with iv abx  dvt prophylaxis  pulmonary noted  ecg noted with old inferior wall mi, no acute changes  tele noted, nsr, no sig arrythmia  continue current meds  ?repeat chest x ray tomorrow  continue current bp meds

## 2022-01-21 NOTE — CONSULT NOTE ADULT - CONSULT REQUESTED DATE/TIME
20-Jan-2022 14:11
20-Jan-2022 14:21
20-Jan-2022 14:50
20-Jan-2022 17:34
21-Jan-2022 16:30
20-Jan-2022

## 2022-01-21 NOTE — DIETITIAN INITIAL EVALUATION ADULT. - ENTERAL
Glucerna 1.5 @ 65ml/hr. running for 16 hours, starting at 5pm. provides 1560kcal and 86 grams protein. provides 26kcal/kg and 1.4 grams protein/kg. based on IBW+10%

## 2022-01-21 NOTE — DIETITIAN INITIAL EVALUATION ADULT. - OTHER INFO
Last BM : none noted since admission  NKFA  Ht: 162.6-dosing  Dosing wt: 90.7kg  Ht used for BMI dosing  Wt used for BMI dosing  Education Provided : N/A  pressure injury: right buttock stage 1

## 2022-01-21 NOTE — DIETITIAN INITIAL EVALUATION ADULT. - PERTINENT MEDS FT
MEDICATIONS  (STANDING):  acetylcysteine 20%  Inhalation 4 milliLiter(s) Inhalation every 6 hours  albuterol/ipratropium for Nebulization 3 milliLiter(s) Nebulizer every 6 hours  allopurinol 100 milliGRAM(s) Oral daily  amLODIPine   Tablet 10 milliGRAM(s) Oral daily  atorvastatin 20 milliGRAM(s) Oral at bedtime  chlorhexidine 2% Cloths 1 Application(s) Topical <User Schedule>  ertapenem  IVPB 1000 milliGRAM(s) IV Intermittent every 24 hours  guaiFENesin Oral Liquid (Sugar-Free) 300 milliGRAM(s) Oral every 6 hours  heparin SubCutaneous Injection - Peds 5000 Unit(s) SubCutaneous every 8 hours  levETIRAcetam  Solution 750 milliGRAM(s) Oral two times a day  levothyroxine 75 MICROGram(s) Oral daily  metoprolol tartrate 25 milliGRAM(s) Oral two times a day  sodium chloride 3%  Inhalation 4 milliLiter(s) Inhalation every 6 hours  vancomycin  IVPB 1000 milliGRAM(s) IV Intermittent every 24 hours    MEDICATIONS  (PRN):

## 2022-01-21 NOTE — PROGRESS NOTE ADULT - SUBJECTIVE AND OBJECTIVE BOX
Subjective:   Consulted yesterday regarding RLQ collection i/s/o recent perforated appendicitis s/p IR drainage. No acute events overnight. Patient seen at bedside this AM.     Objective:  Vital Signs  T(C): 36.4 (01-21 @ 04:00), Max: 36.9 (01-20 @ 18:00)  HR: 86 (01-21 @ 04:00) (71 - 109)  BP: 109/63 (01-21 @ 04:00) (109/63 - 128/70)  RR: 18 (01-21 @ 04:00) (18 - 24)  SpO2: 98% (01-21 @ 04:00) (85% - 98%)  01-20-22 @ 07:01  -  01-21-22 @ 05:37  --------------------------------------------------------  IN:  Total IN: 0 mL    OUT:    Voided (mL): 500 mL  Total OUT: 500 mL    Total NET: -500 mL          Physical Exam:  General: No acute distress  Respiratory: Nonlabored  Cardiovascular: appears well perfused  Abdominal: Soft, nondistended, nontender. No rebound or guarding. No organomegaly, no palpable mass.  bruising  Extremities: Warm    Labs:                        9.6    9.83  )-----------( 183      ( 20 Jan 2022 08:53 )             32.3   01-20    141  |  103  |  32<H>  ----------------------------<  188<H>  4.9   |  24  |  0.62    Ca    9.9      20 Jan 2022 08:53    TPro  7.5  /  Alb  3.8  /  TBili  0.3  /  DBili  x   /  AST  29  /  ALT  16  /  AlkPhos  105  01-20    CAPILLARY BLOOD GLUCOSE          Imaging:     Subjective:   Consulted yesterday regarding RLQ collection i/s/o recent perforated appendicitis s/p IR drainage. No acute events overnight. Patient seen at bedside this AM. Endorses pain.    Objective:  Vital Signs  T(C): 36.4 (01-21 @ 04:00), Max: 36.9 (01-20 @ 18:00)  HR: 86 (01-21 @ 04:00) (71 - 109)  BP: 109/63 (01-21 @ 04:00) (109/63 - 128/70)  RR: 18 (01-21 @ 04:00) (18 - 24)  SpO2: 98% (01-21 @ 04:00) (85% - 98%)  01-20-22 @ 07:01  -  01-21-22 @ 05:37  --------------------------------------------------------  IN:  Total IN: 0 mL    OUT:    Voided (mL): 500 mL  Total OUT: 500 mL    Total NET: -500 mL          Physical Exam:  General: No acute distress  Respiratory: Nonlabored  Cardiovascular: appears well perfused  Abdominal: Soft, nondistended, nontender. No rebound or guarding. No organomegaly, no palpable mass.  bruising  Extremities: Warm    Labs:                        9.6    9.83  )-----------( 183      ( 20 Jan 2022 08:53 )             32.3   01-20    141  |  103  |  32<H>  ----------------------------<  188<H>  4.9   |  24  |  0.62    Ca    9.9      20 Jan 2022 08:53    TPro  7.5  /  Alb  3.8  /  TBili  0.3  /  DBili  x   /  AST  29  /  ALT  16  /  AlkPhos  105  01-20    CAPILLARY BLOOD GLUCOSE          Imaging:

## 2022-01-21 NOTE — PROGRESS NOTE ADULT - ASSESSMENT
ASSESSMENT:    86 year old gentlewoman, lifelong non-smoker, without history of intrinsic lung disease. 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 She also has a history of HTN, HLD, CAD s/p MI with preserved LVEF and moderate aortic stenosis. The patient was admitted to [facility] on December 19th 2021 with fever and abdominal pain. She was found to have perforated appendicitis with abscess formation. She was treated with tube drainage and antibiotics for a polymicrobial infection. She continues on vancomycin via a PICC line. Admission CT had debris within the left lobar and more distal airways of the left lower lobe as well as some debris within the right lower lobe airway - there was complete atelectasis of the left lower lobe and subsegmental atelectatic changes within the left upper lobe and dependent portions of the right lung. The patient developed increased work of breathing, tachycardia, tachypnea and hypoxemia on January 4th due to mucous plugging with complete collapse of the left lung. She underwent bronchoscopy on January 6th with removal of copious amounts of purulent secretions from throughout the bronchial tree - there was severe tracheobronchomalacia. Bronchial cultures were negative. She was treated with bronchodilators, mucolytic nebs, chest vest and nocturnal AVAPS with expansion of the left upper lobe and some reexpansion of the left lower lobe. She was discharged to rehab on January 11th on a 3lpm nasal canula with plans to continue medical and mechanical therapy as well as nocturnal BIPAP. The patient returns from rehab with decreased mental status, dyspnea and hypoxemia treated with 100% NRB. She currently is obtunded on a BIPAP device. CT scan -> partial left upper lobe and complete lingular and left lower lobe collapse with superimposed bronchial impaction - small left pleural effusion with intrafissural extension - patchy groundglass opacities at the right apex. The patient has redeveloped left lung atelectasis due to mucous plugging -> multifactorial. There is an area of inflammation/infection in the right upper lobe  1) tracheobronchomalacia with marked expiratory airway narrowing preventing adequate sputum clearance  2) weak cough following a CVA with marked deconditioning   3) dysphagia with ongoing aspiration of oral secretions    PLAN/RECOMMENDATIONS:    oxygen supplementation using a high flow nasal canula   follow-up CT 1/23 - hoping to avoid another bronchoscopy which will unlikely result in long term improvement  nocturnal AVAPS to facilitate lung expansion  s/p bronchoscopy 1/6 with removal of copious amounts of purulent mucous from throughout the airways - there was severe tracheobronchomalacia -> cultures are negative  vancomycin/ertapenem  albuterol/atrovent nebs q6h  hypertonic saline and mucomyst nebs to facilitate mucous clearance  guainfenesin  chest vest/manual chest PT  cough assist device  cardiac meds: lopressor/norvasc/lipitor  DVT prophylaxis - SQ heparin  allopurinol/keppra/synthroid  glucose control  abdominal/pelvic CT - percutaneous gastrostomy tube present with intraluminal balloon - colonic diverticulosis without diverticulitis - interval removal of the right lower quadrant pigtail catheter in the region of the appendix - there is a residual, multilocular 4.0 x 2.2 cm collection with peripheral enhancement in the region of the previously perforated appendix - periappendiceal fat stranding has largely resolved - ID/IR/surgery evaluations noted    I am not hopeful that the left lung will expand due to the multiple issues delineated above - suspect that she will need to be treated with oxygen supplementation to keep saturation greater than 90% accepting the fact that her left lung will be basically non-functional    Will follow with you. Plan of care discussed with the patient at bedside, with the dedicated floor NP and with her son Luis by phone    Kennedy Marcnao MD, [facility]  [phone]  Pulmonary Medicine

## 2022-01-21 NOTE — CONSULT NOTE ADULT - SUBJECTIVE AND OBJECTIVE BOX
JANINE, JOAN 86y Female  MRN-[id number]    Patient is a 86y old  Female who presents with a chief complaint of sob (21 Jan 2022 11:07)      HPI:   85 y/o female, hx of CVA, MI, breast CA, COPD, not on home 02,  s/p  recent  perforated  appendox/ abscess,  HN, HLD, hypothyroid,  obexity, relatively  be d boind, , gout   BIBEMS from [facility] due to SOB., started this morning   patient initial 02 sat was 84% placed on NRB and improved to 93%.      has a pic line to left arm ,  and  peg tube   . patient AOx3.     denies f/n/v/d, CP, dizziness, HA. (20 Jan 2022 13:43)      PAST MEDICAL & SURGICAL HISTORY:  MI (myocardial infarction)    HTN (hypertension)    Personal history of asbestosis    Gout    Dyslipidemia    UTI (lower urinary tract infection)    ETOH abuse    CVA (cerebral vascular accident)    History of left shoulder fracture    History of benign breast tumor        Allergies    Toradol (Urticaria (Mild to Mod); Rash (Mild to Mod))    Intolerances        ANTIMICROBIALS:  ertapenem  IVPB 1000 every 24 hours  vancomycin  IVPB 1000 every 24 hours      MEDICATIONS  (STANDING):  acetylcysteine 20%  Inhalation 4 milliLiter(s) Inhalation every 6 hours  albuterol/ipratropium for Nebulization 3 milliLiter(s) Nebulizer every 6 hours  allopurinol 100 milliGRAM(s) Oral daily  amLODIPine   Tablet 10 milliGRAM(s) Oral daily  atorvastatin 20 milliGRAM(s) Oral at bedtime  chlorhexidine 2% Cloths 1 Application(s) Topical <User Schedule>  ertapenem  IVPB 1000 milliGRAM(s) IV Intermittent every 24 hours  guaiFENesin Oral Liquid (Sugar-Free) 300 milliGRAM(s) Oral every 6 hours  heparin SubCutaneous Injection - Peds 5000 Unit(s) SubCutaneous every 8 hours  levETIRAcetam  Solution 750 milliGRAM(s) Oral two times a day  levothyroxine 75 MICROGram(s) Oral daily  metoprolol tartrate 25 milliGRAM(s) Oral two times a day  sodium chloride 3%  Inhalation 4 milliLiter(s) Inhalation every 6 hours  vancomycin  IVPB 1000 milliGRAM(s) IV Intermittent every 24 hours      Social History  Smoking: former  Etoh: no        FAMILY HISTORY:  No pertinent family history in first degree relatives        Vital Signs Last 24 Hrs  T(C): 37.1 (21 Jan 2022 12:27), Max: 37.1 (21 Jan 2022 12:27)  T(F): 98.8 (21 Jan 2022 12:27), Max: 98.8 (21 Jan 2022 12:27)  HR: 101 (21 Jan 2022 15:25) (71 - 103)  BP: 99/67 (21 Jan 2022 12:27) (99/67 - 118/56)  BP(mean): --  RR: 20 (21 Jan 2022 15:25) (18 - 20)  SpO2: 94% (21 Jan 2022 15:25) (85% - 98%)    CBC Full  -  ( 20 Jan 2022 08:53 )  WBC Count : 9.83 K/uL  RBC Count : 2.96 M/uL  Hemoglobin : 9.6 g/dL  Hematocrit : 32.3 %  Platelet Count - Automated : 183 K/uL  Mean Cell Volume : 109.1 fl  Mean Cell Hemoglobin : 32.4 pg  Mean Cell Hemoglobin Concentration : 29.7 gm/dL  Auto Neutrophil # : x  Auto Lymphocyte # : x  Auto Monocyte # : x  Auto Eosinophil # : x  Auto Basophil # : x  Auto Neutrophil % : x  Auto Lymphocyte % : x  Auto Monocyte % : x  Auto Eosinophil % : x  Auto Basophil % : x    01-20    141  |  103  |  32<H>  ----------------------------<  188<H>  4.9   |  24  |  0.62    Ca    9.9      20 Jan 2022 08:53    TPro  7.5  /  Alb  3.8  /  TBili  0.3  /  DBili  x   /  AST  29  /  ALT  16  /  AlkPhos  105  01-20    LIVER FUNCTIONS - ( 20 Jan 2022 08:53 )  Alb: 3.8 g/dL / Pro: 7.5 g/dL / ALK PHOS: 105 U/L / ALT: 16 U/L / AST: 29 U/L / GGT: x           Urinalysis Basic - ( 20 Jan 2022 14:56 )    Color: Yellow / Appearance: Slightly Turbid / SG: >1.050 / pH: x  Gluc: x / Ketone: Negative  / Bili: Negative / Urobili: Negative   Blood: x / Protein: 100 / Nitrite: Negative   Leuk Esterase: Large / RBC: 7 /hpf / WBC 25 /HPF   Sq Epi: x / Non Sq Epi: 5 /hpf / Bacteria: Few        MICROBIOLOGY:  .Blood Blood-Peripheral  01-20-22   No growth to date.  --  --      BAL Bronchoalveolar Lavage  01-06-22   No growth at 1 week.  --    No polymorphonuclear cells seen per low power field  No squamous epithelial cells per low power field  No organisms seen per oil power field      .Blood Blood-Venous  12-24-21   No Growth Final  --  --      .Blood Blood-Peripheral  12-24-21   No Growth Final  --  --      .Abscess abdominal abscess  12-23-21   Culture yields >4 types of aerobic and/or anaerobic bacteria  Call client services within 7 days if further workup is clinically  indicated. Culture includes  Few Escherichia coli  Few Morganella morganii  Moderate Enterococcus avium  Numerous Bacteroides thetaiotamcron group "Susceptibilities not performed"  Numerous Clostridium ramosum "Susceptibilities not performed"  --  Escherichia coli  Morganella morganii  Enterococcus avium        Rapid RVP Result: NotDete (01-20 @ 08:52)        RADIOLOGY  < from: CT Abdomen and Pelvis w/ IV Cont (01.20.22 @ 10:35) >  Limited exam for pulmonary embolism; no central pulmonary embolism with   nondiagnostic evaluation of lobar, segmental and subsegmental branches.   No imaging evidence of right heart strain.    Complete lingular and left lower lobe collapse withsuperimposed   bronchial impaction. Small left pleural effusion. Mild patchy right   apical groundglass may be related to pulmonary edema, infection or   inflammation.    Multilocular 4.0 x 2.2 cm right lower quadrant fluid collection in the   regionof previously perforated appendix, status post drain removal.   Periappendiceal fat stranding has largely resolved.    Left PICC crosses midline and terminates in the right innominate vein,   likely repositioned during the administration of contrast between    imaging and CTA acquisition.    Cholelithiasis and choledocholithiasis. No intrahepatic biliary ductal   dilatation.    < end of copied text >  < from: Xray Chest 1 View AP/PA (01.20.22 @ 08:55) >  Small left pleural effusion with complete left lower lobe   consolidation/collapse.    < end of copied text >  < from: CT Head No Cont (01.20.22 @ 10:35) >  Limited by motion.    No gross evidence for acute intracranial hemorrhage or brain edema.    No hydrocephalus or midline shift.    Complete opacification of the right maxillary sinus and right mastoid air   cells. Correlate for the presence of sinusitis/mastoiditis.    < end of copied text >

## 2022-01-21 NOTE — PATIENT PROFILE ADULT - FALL HARM RISK - FALL HARM RISK
OFFICE VISIT      Patient: Marley Mccauley Date of Service: 1/10/2020   : 1944 MRN: [id number]     SUBJECTIVE:     Chief Complaint   Patient presents with   • Bladder Problem     since 2019   • Hypertension   • Diabetes   • Immunizations     pt. decline flu vaccine      Last seen on 19  she is accompanied by her daughter.  Patient is agreeable to have a scribe present during the visit.     CONSULTS SINCE LAST VISIT: int med (Dr. Taveras, 19)    Pt presented to int med on 19 with c/o dysuria. She was dxed with an acute UTI and rxed macrobid 100 mg 1 cap BID for 5 days.     Today, pt reports continued dysuria. She states abx helped for a little while, however it has worsened again. Pt's daughter reports the pt has a weak bladder and has been experiencing urinary incontinence for over a year.  Her incontinence appears to have worsened per patient. Pt's daughter has been having pt use incontinence pads. This is not a new problem for the pt. Pt admits to drinking 3-4 cups of coffee a day. She smokes cigs on rare occasions    She denies abd pain, N/V/D or constipation.    The pt reports compliance with all medications as prescribed, and presents with no new complaints today.    Review of Systems   Constitutional: Negative for fever.   Respiratory: Negative for shortness of breath.    Cardiovascular: Negative for chest pain.   All other systems reviewed and are negative.      ALLERGIES:  ALLERGIES:   Allergen Reactions   • Acetaminophen Other (See Comments)     Paresthesia       Also suffers fingers \"turn white\" when takes Tylenol          • Penicillins Other (See Comments)     Allergy       unknown       MEDICATIONS:  Current Outpatient Medications   Medication Sig Dispense Refill   • Multiple Vitamins-Minerals (CENTRUM SILVER ADULT 50+) Tab Take 1 each by mouth daily (with breakfast). OTC 90 tablet 0   • Cholecalciferol (VITAMIN D) 2000 units capsule Take 1 capsule by mouth daily (with breakfast).  OTC 90 capsule 1   • guaiFENesin (MUCINEX) 600 MG 12 hr tablet 1-2 tabs twice daily for 5 days then as needed for cough 30 tablet 0   • nitrofurantoin, macrocrystal-monohydrate, (MACROBID) 100 MG capsule Take 1 capsule by mouth 2 times daily. 10 capsule 0   • HERMINIA CONTOUR NEXT TEST test strip TEST ONCE DAILY AS DIRECTED 100 strip 2   • loratadine (CLARITIN) 10 MG tablet 1/2 to 1 tab daily as needed for allergies 30 tablet 3   • doxycycline monohydrate (MONODOX) 100 MG capsule 1 tab twice daily for infection 14 capsule 0   • LANCETS ULTRA FINE Misc Check blood sugar one daily 100 each 2     No current facility-administered medications for this visit.        PAST MEDICAL HISTORY:  Past Medical History:   Diagnosis Date   • Anxiety    • Diabetes mellitus (CMS/HCC)    • Essential (primary) hypertension    • Hyperlipidemia    • Obstructive chronic bronchitis without exacerbation (CMS/HCC)        PAST SURGICAL HISTORY:  Past Surgical History:   Procedure Laterality Date   • Hysterectomy     • Tubal ligation         FAMILY HISTORY:  Family History   Problem Relation Age of Onset   • Cancer Brother        SOCIAL HISTORY:  Social History     Tobacco Use   • Smoking status: Current Some Day Smoker     Packs/day: 0.00   • Smokeless tobacco: Never Used   Substance Use Topics   • Alcohol use: No     Frequency: Never   • Drug use: No       Drug Use:    No                Social History     Patient does not qualify to have social determinant information on file (likely too young).   Social History Narrative   • Not on file       Past medical history, family medical history, surgical history and social history reviewed      OBJECTIVE:     Visit Vitals  /43 (BP Location: LUE - Left upper extremity, Patient Position: Sitting, Cuff Size: Regular)   Pulse 54   Temp 97.8 °F (36.6 °C) (Oral)   Resp 17   Ht 5' 1\" (1.549 m)   Wt 60.5 kg (133 lb 6.1 oz)   SpO2 96%   BMI 25.20 kg/m²       Physical Exam   Constitutional: She is oriented  to person, place, and time. She appears well-developed and well-nourished.   HENT:   Head: Normocephalic and atraumatic.   Mouth/Throat: Oropharynx is clear and moist. No oropharyngeal exudate.   Eyes: Pupils are equal, round, and reactive to light. Right eye exhibits no discharge. Left eye exhibits no discharge.   Neck: Neck supple.   Cardiovascular: Normal rate and regular rhythm. Exam reveals no gallop and no friction rub.   No murmur heard.  Varicose veins noted to the BLE.   Pulmonary/Chest: Effort normal and breath sounds normal. No respiratory distress. She has no wheezes.   Abdominal: Soft. Bowel sounds are normal. She exhibits no distension and no mass. There is no tenderness.      Musculoskeletal: Normal range of motion.         General: No edema.     Lymphadenopathy:     She has no cervical adenopathy.   Neurological: She is alert and oriented to person, place, and time.   Skin: No rash noted.   Psychiatric: She has a normal mood and affect.   Nursing note and vitals reviewed.      DIAGNOSTIC STUDIES:   LAB RESULTS:    Office Visit on 01/10/2020   Component Date Value Ref Range Status   • POCT Color 01/10/2020 Yellow   Final   • POCT Appearance 01/10/2020 Clear   Final   • POCT Glucose Urine 01/10/2020 Negative  Negative Final   • POCT Bilirubin 01/10/2020 Negative  Negative Final   • POCT Ketones 01/10/2020 Negative  Negative Final   • POCT Specific Gravity 01/10/2020 1.010  1.005 - 1.03 Final   • POCT Occult Blood 01/10/2020 Trace - Intact  Negative Final   • POCT pH 01/10/2020 5.5  5 - 7 Final   • POCT Protein 01/10/2020 Negative  Negative Final   • POCT Urobilinogen 01/10/2020 0.2  0 - 1 mg/dL Final   • Urine Nitrite 01/10/2020 Negative  Negative Final   • WBC (Leukocyte) Esterase POC 01/10/2020 Trace  Negative Final     ASSESSMENT   Assessment   Urinary incontinence, unspecified type  - SERVICE TO UROLOGY    Increased urinary frequency  - POCT URINE DIP AUTO  - SERVICE TO UROLOGY    Bacterial UTI  -  SERVICE TO UROLOGY  - URINE, BACTERIAL CULTURE; Future  - URINE, BACTERIAL CULTURE    Vitamin D deficiency  - VITAMIN D -25 HYDROXY; Future    Hypertension complicating diabetes (CMS/HCC)  - CBC WITH DIFFERENTIAL; Future  - COMPREHENSIVE METABOLIC PANEL; Future  - GLYCOHEMOGLOBIN; Future    Overlapping toe, acquired, right  - SERVICE TO PODIATRY    Screening for eye condition  - SERVICE TO OPHTHALMOLOGY    Hematuria, microscopic  - SERVICE TO UROLOGY    PLAN:   This is a 75 year old female who presents for chronic disease management.    1. Urinary incontinence, unspecified type    2. Increased urinary frequency    3. Bacterial UTI    4. Vitamin D deficiency    5. Hypertension complicating diabetes (CMS/HCC)    6. Overlapping toe, acquired, right    7. Screening for eye condition    8. Hematuria, microscopic      Bacterial UTI / Urinary Incontinence / Cystocele hx /  Microscopic Hematuria-- currently experiencing dysuria and frequency --   - UA done in-office today, 1/10/20, showed trace blood, negative nitrites- appears improved from previous in dec 2019- she is s/p taking macrobid  - await Urine Culture   - Referred to urology on 1/10/20  - Advised reducing caffeine intake (may be contributing to worsening sx), push fluids     HTN and DM 2  a. HTN-- diet-controlled-- goal bp <130/80  - Emphasized low salt diet  b. DM 2-- A1c controlled per most recent labs-- goal a1c <7  - CPM with diabetic diet-- pt not on meds    COPD-- has hx and still occ smoking; patient asymptomatic althou has occ cough  - Referred for PFT in the past-- pt declines      Chronic Pain in Multiple Joints (neck, L hip, L knee)  A. Chronic Neck Pain-- due to DJD per CT scan done 2/2019  B. Chronic L hip Pain  C. Chronic L Knee Pain-- due to osteopenia and OA per XR done 2/2019    DM 2 and HLD  a. HLD-- long standing hx and has been controlled with diet  - Advised to take statin given diabetes-- pt declines    Vitamin D Insufficiency-- low per most  recent labs  - CPM with OTC cetrum qd and vitamin D 2K IU qd      RHM:  - Pap smear-- due-- pt declines  - Mammo-- last done 2/2016 (dense breasts, benign vascular calcifications in both breasts)-- overdue since 2/2017-- ordered again on 5/1/19-- pt still declines for now   - Colonoscopy-- pt declines for now  - Ophthal-- referred on 5/1/19-- reminded to schedule on 1/10/20  - Podiatry-- referred on 5/1/19 (overlapping toes, R foot- unchanged)-- reminded to schedule on 1/10/20  - Shots-- Tdap, high dose flu, pneum-13 and 23 and shingles-- declined again on 1/10/20    REMINDERS:  FOLLOW UP WITH DR. HUNT in 5 weeks (mid Feb 2020).  LABS DUE 1 week before next visit.  KEEP/SCHEDULE APPT: urology, ophthal, podiatry   EDUCATION: push fluids, continue pads, keep area clean  NEXT VISIT: CDM, result review      Instructions provided as documented in the AVS.      The Patient and/family or representative  indicated understanding of the diagnosis and agreed with the plan of care.      Scribe Attestation: Entered by Esperanza Mosquera, acting as scribe for Dr. Anna Hunt    Provider Attestation: The documentation recorded by the scribe accurately reflects the service I personally performed and the decisions made by me, Rambo Hunt MD   No indicators present

## 2022-01-21 NOTE — CHART NOTE - NSCHARTNOTEFT_GEN_A_CORE
Patient is a 86y old  Female who presents with a chief complaint of sob (21 Jan 2022 15:18)  Pt is presently on hi-flow 60%.  Pt found at times removing her hi-flow frequently during the day.    Pt scheduled to have PICC line placed by IR.      HPI:    Vital Signs Last 24 Hrs  T(C): 37.1 (21 Jan 2022 12:27), Max: 37.1 (21 Jan 2022 12:27)  T(F): 98.8 (21 Jan 2022 12:27), Max: 98.8 (21 Jan 2022 12:27)  HR: 101 (21 Jan 2022 15:25) (71 - 103)  BP: 99/67 (21 Jan 2022 12:27) (99/67 - 116/59)  BP(mean): --  RR: 20 (21 Jan 2022 15:25) (18 - 20)  SpO2: 94% (21 Jan 2022 15:25) (85% - 98%)    Gen: 85 y/o female wd in nad at present.   Skin: Edematous in the upper extremities.     Laboratory:                            9.6    9.83  )-----------( 183      ( 20 Jan 2022 08:53 )             32.3       BNP: Serum Pro-Brain Natriuretic Peptide: 148 pg/mL (01-20 @ 08:53)      Johanna De La Cruz ANP-BC  Spectralink #[id number] Patient is a 86y old  Female who presents with a chief complaint of sob (21 Jan 2022 15:18)  Pt is presently on hi-flow 60%.  Pt found at times removing her hi-flow frequently during the day.    Pt scheduled to have PICC line placed by IR.      HPI:    Vital Signs Last 24 Hrs  T(C): 37.1 (21 Jan 2022 12:27), Max: 37.1 (21 Jan 2022 12:27)  T(F): 98.8 (21 Jan 2022 12:27), Max: 98.8 (21 Jan 2022 12:27)  HR: 101 (21 Jan 2022 15:25) (71 - 103)  BP: 99/67 (21 Jan 2022 12:27) (99/67 - 116/59)  BP(mean): --  RR: 20 (21 Jan 2022 15:25) (18 - 20)  SpO2: 94% (21 Jan 2022 15:25) (85% - 98%)    Gen: 87 y/o female wd in nad at present.   Skin: Edematous in the upper extremities.  Pt with stage 1 skin tear to sacrum;. (+) right mastectomy.   Resp: Diminished breath sounds bilaterally. Pt presently on hiflow 50%.   CV: S1 S2   Chest: Symmetrical, equal lung expansion.   Extremities: (+) PICC line in left upper extremities.        Laboratory:                            9.6    9.83  )-----------( 183      ( 20 Jan 2022 08:53 )             32.3       BNP: Serum Pro-Brain Natriuretic Peptide: 148 pg/mL (01-20 @ 08:53)    Impression:   Acute hypoxic respiratory failure    Plan:   Pt unable to have PICC line done secondary to unable to tolerate vm or 100% nrb to be transported to IR.   IV team did insert 2 peripheral ivs.   IV team will attempt to place PICC line at the bedside on Sunday.    Seen by Pulmonary.  Possible bronch next week.    Seen by Dietician.  Peg feeds has been adjusted.      Johanna De La Cruz ANP-BC  Spectralink #[phone]

## 2022-01-21 NOTE — CONSULT NOTE ADULT - ASSESSMENT
Interventional Radiology    Evaluate for Procedure: PICC exchange     HPI: 85y/o F with hx CVA with residual left weakness (~3 years ago), PEG for dysphagia, hypothyroid, COPD, HTN, gout, admitted 12/19/21 with fever and SOB. Had perforated appendix treated conservatively with drainage by IR and antibiotics with a malpositioned PICC used for IV abx for exchange.     Allergies: Toradol (Urticaria (Mild to Mod); Rash (Mild to Mod))    Medications (Abx/Cardiac/Anticoagulation/Blood Products)    amLODIPine   Tablet: 10 milliGRAM(s) Oral (01-20 @ 20:26)  ertapenem  IVPB: 120 mL/Hr IV Intermittent (01-21 @ 14:16)  heparin SubCutaneous Injection - Peds: 5000 Unit(s) SubCutaneous (01-21 @ 14:12)  metoprolol tartrate: 25 milliGRAM(s) Oral (01-21 @ 09:11)  vancomycin  IVPB: 250 mL/Hr IV Intermittent (01-20 @ 20:25)    Data:    T(C): 37.1  HR: 101  BP: 99/67  RR: 18  SpO2: 94%    -WBC 9.83 / HgB 9.6 / Hct 32.3 / Plt 183  -Na 141 / Cl 103 / BUN 32 / Glucose 188  -K 4.9 / CO2 24 / Cr 0.62  -ALT 16 / Alk Phos 105 / T.Bili 0.3  -INR 1.00 / PTT 31.0    Radiology:     Assessment/Plan: 86y Female with a malpositioned PICC for exchange for IV abx.     - NEED COVID TEST WITHIN 72HRS OF PROCEDURE.  - Pt Doesn't need to be NPO.  - for PICC exchange today 1/21.  - Place order under DIXON Lozano.

## 2022-01-21 NOTE — CONSULT NOTE ADULT - ASSESSMENT
87 y/o female, hx of CVA, MI, breast CA, COPD, not on home 02, s/p recent  perforated  appendox/ abscess,  HN, HLD, hypothyroid,  obexity, relatively  be d boind, , gout BIBEMS from [facility] due to SOB., started this morning patient initial 02 sat was 84% placed on NRB and improved to 93 with recent perforated appendicitis, possible IE on long term iv abx     John Fragoso  Attending Physician   Division of Infectious Disease  Pager #[phone]  Available on [employer] Teams also  After 5pm/weekend or no response, call #[phone]

## 2022-01-21 NOTE — PROGRESS NOTE ADULT - SUBJECTIVE AND OBJECTIVE BOX
[facility] - [facility] - PROGRESS NOTE    CHIEF COMPLAINT: acute hypoxic respiratory failure; mucous plugging; left lung atelectasis; weak cough; dysphagia; tracheobronchomalacia; PEG;     INTERVAL HISTORY: awake and alert on AVAPS; no shortness of breath or hypoxemia; no cough, sputum production, hemoptysis, chest congestion or wheeze; no fevers, chills or sweats; no chest pain/pressure or palpitations; severe knee pain which is laying on the side rail of the bed      REVIEW OF SYSTEMS:  Constitutional: As per interval history  HEENT: Within normal limits  CV: As per interval history  Resp: As per interval history  GI: dysphagia  : Within normal limits  Musculoskeletal: Within normal limits  Skin: Within normal limits  Neurological: CVA - > left sided weakness;/plegia  Psychiatric: Within normal limits  Endocrine: Within normal limits  Hematologic/Lymphatic: Within normal limits  Allergic/Immunologic: Within normal limits    MEDICATIONS:     Pulmonary "  acetylcysteine 20%  Inhalation 4 milliLiter(s) Inhalation every 6 hours  albuterol/ipratropium for Nebulization 3 milliLiter(s) Nebulizer every 6 hours  guaiFENesin Oral Liquid (Sugar-Free) 300 milliGRAM(s) Oral every 6 hours  sodium chloride 3%  Inhalation 4 milliLiter(s) Inhalation every 6 hours    Anti-microbials:  ertapenem  IVPB 1000 milliGRAM(s) IV Intermittent every 24 hours  vancomycin  IVPB 1000 milliGRAM(s) IV Intermittent every 24 hours    Cardiovascular:  amLODIPine   Tablet 10 milliGRAM(s) Oral daily  metoprolol tartrate 25 milliGRAM(s) Oral two times a day    Other:  allopurinol 100 milliGRAM(s) Oral daily  atorvastatin 20 milliGRAM(s) Oral at bedtime  chlorhexidine 2% Cloths 1 Application(s) Topical <User Schedule>  heparin SubCutaneous Injection - Peds 5000 Unit(s) SubCutaneous every 8 hours  levETIRAcetam  Solution 750 milliGRAM(s) Oral two times a day  levothyroxine 75 MICROGram(s) Oral daily    OBJECTIVE:    I&O's Detail    20 Jan 2022 07:01  -  21 Jan 2022 07:00  --------------------------------------------------------  IN:    Enteral Tube Flush: 220 mL    Jevity 1.2: 500 mL  Total IN: 720 mL    OUT:    Voided (mL): 900 mL  Total OUT: 900 mL    Total NET: -180 mL    PHYSICAL EXAM:       ICU Vital Signs Last 24 Hrs  T(C): 36.4 (21 Jan 2022 04:00), Max: 36.9 (20 Jan 2022 18:00)  T(F): 97.6 (21 Jan 2022 04:00), Max: 98.5 (20 Jan 2022 18:00)  HR: 101 (21 Jan 2022 11:16) (71 - 103)  BP: 109/63 (21 Jan 2022 04:00) (109/63 - 128/70)  BP(mean): --  ABP: --  ABP(mean): --  RR: 18 (21 Jan 2022 04:00) (18 - 21)  SpO2: 97% (21 Jan 2022 11:16) (85% - 98%) on AVAPS     General: Awake. Alert. Cooperative. Knee pain Appears stated age.	  HEENT: Atraumatic. Normocephalic. Anicteric. Normal oral mucosa. PERRL. EOMI. Full face mask  Neck: Supple. Trachea midline. Thyroid without enlargement/tenderness/nodules. No carotid bruit. No JVD. Short and wide  Cardiovascular: Regular rate and rhythm. S1 S2 normal. III/VI systolic murmur  Respiratory: Respirations unlabored. Decreased breath sounds left hemithorax. Diffuse course breath sounds. No curvature.  Abdomen: Soft. Non-tender. Non-distended. No organomegaly. No masses. Normal bowel sounds. Obese, PEG.  Extremities: Warm to touch. No clubbing or cyanosis. No pedal edema. LUE PICC line  Pulses: 2+ peripheral pulses all extremities.	  Skin: Normal skin color. No rashes or lesions. No ecchymoses. No cyanosis. Warm to touch.  Lymph Nodes: Cervical, supraclavicular and axillary nodes normal  Neurological: Left lower extremity plegia with contraction. Left upper extremity is quite weak. A and O x 3  Psychiatry: Calm    LABS:                          9.6    9.83  )-----------( 183      ( 20 Jan 2022 08:53 )             32.3     CBC    WBC  9.83 <==    Hemoglobin  9.6 <<==    Hematocrit  32.3 <==    Platelets  183 <==      141  |  103  |  32<H>  ----------------------------<  188<H>    01-20  4.9   |  24  |  0.62      LYTES    sodium  141 <==    potassium   4.9 <==    chloride  103 <==    carbon dioxide  24 <==    =============================================================================================  RENAL FUNCTION:    Creatinine:   0.62  <<==    BUN:   32 <==    ============================================================================================    calcium   9.9 <==    ============================================================================================  LFTs    AST:   29 <==     ALT:  16  <==     AP:  105  <=    Bili:  0.3  <=      PT/INR - ( 20 Jan 2022 08:53 )   PT: 12.0 sec;   INR: 1.00 ratio       PTT - ( 20 Jan 2022 08:53 )  PTT:31.0 sec    Venous Blood Gas:  01-20 @ 08:53  7.34/55/52/30/83.0  VBG Lactate: 1.7    Serum Pro-Brain Natriuretic Peptide: 148 pg/mL (01-20 @ 08:53)    CARDIAC MARKERS ( 20 Jan 2022 08:53 )  CPK x     /CKMB x     /CKMB Units x        troponin 21 ng/L    < from: Transthoracic Echocardiogram (12.21.21 @ 14:39) >    Patient name: FRANKI MEHTA  YOB: 1935   Age: 86 (F)   MR#: [id number]  Study Date: 12/21/2021  Location: [address] [place], [place] [id number]: [facility]  Study quality: Technically difficult/Limited  Referring Physician: Edmond Almazan MD  Blood Pressure: 134/77 mmHg  Height: 163 cm  Weight: 109 kg  BSA: 2.1 m2  Heart Rate: 105 mmHg  ------------------------------------------------------------------------  PROCEDURE: Transthoracic echocardiogram with 2-D, M-Mode  and complete spectral andcolor flow Doppler.  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 No segmental  wall motion abnormalities.  Moderate aortic stenosis.  There may be a vegetation on the left or non-coronary cusp  of the aortic valve. Consider transesophageal  echocardiography.  ------------------------------------------------------------------------  Confirmed on  12/22/2021 - 10:32:40 by Rahul Correa M.D.  ------------------------------------------------------------------------    MICROBIOLOGY:     Respiratory Viral Panel with COVID-19 by RYAN (01.20.22 @ 08:52)   Rapid RVP Result: [facility]   SARS-CoV-2: [facility]    Urinalysis Basic - ( 20 Jan 2022 14:56 )    Color: Yellow / Appearance: Slightly Turbid / SG: >1.050 / pH: x  Gluc: x / Ketone: Negative  / Bili: Negative / Urobili: Negative   Blood: x / Protein: 100 / Nitrite: Negative   Leuk Esterase: Large / RBC: 7 /hpf / WBC 25 /HPF   Sq Epi: x / Non Sq Epi: 5 /hpf / Bacteria: Few    Culture - Bronchial (01.06.22 @ 18:48)   Culture Results:   Normal Respiratory Regina present     Culture - Abscess with Gram Stain (12.23.21 @ 18:49)   Specimen Source: .Abscess abdominal abscess   Culture Results:   Few Escherichia coli   Few Morganella morganii   Moderate Enterococcus avium   Numerous Bacteroides thetaiotamcron group "Susceptibilities not performed"   Numerous Clostridium ramosum "Susceptibilities not performed"     Culture - Blood (12.19.21 @ 14:24)   Culture Results:   Growth in aerobic bottle: Staphylococcus epidermidis   Organism Identification: Staphylococcus epidermidis     Culture - Blood (12.19.21 @ 14:24)   Gram Stain:   Growth in aerobic bottle: Gram Positive Cocci in Clusters   Growth in anaerobic bottle: Gram Positive Cocci in Clusters   - Staphylococcus epidermidis, Methicillin resistant: Detec     RADIOLOGY:  [x] Chest radiographs reviewed and interpreted by me    < from: CT Abdomen and Pelvis w/ IV Cont (01.20.22 @ 10:35) >    EXAM:  CT ABDOMEN AND PELVIS IC                        EXAM:  CT ANGIO CHEST PULM ART [facility]                          PROCEDURE DATE:  01/20/2022      FINDINGS:    CHEST:    PULMONARY ANGIOGRAM: Limited study secondary to extensive respiratory   motion artifact. No evidence of large central pulmonary embolism, with   limited evaluation of lobar, segmental and subsegmental branches. The   pulmonary artery is enlarged measuring 3.3 cm, nonspecific although can   be seen with pulmonary hypertension.    MEDIASTINUM/LYMPH NODES: No mediastinal or hilar lymphadenopathy. Small   hiatal hernia.    AIRWAYS/LUNGS/PLEURA: Degraded by respiratory motion artifact. There is   partial left upper lobe and complete lingular and left lower lobe   collapse with superimposed bronchial impaction. There is a small left   pleural effusion with intrafissural extension. There are patchy   groundglass opacities at the right apex, likely infectious/inflammatory.   No pneumothorax. Mild subpleural reticulation along the right middle lobe   may be secondary to right breast/chest wall radiotherapy.    HEART/VASCULATURE: Heart size is normal. No pericardial effusion. Mild   aortic valvular and mitral annular calcification. A left PICC is present   which has repositioned between the acquisition of the  imaging   (terminating in the SVC on ) and the chest CTA, now crossing midline   into the right innominate vein.    CHEST WALL AND LOWER NECK: The thyroid is incompletely characterized on   this study. Status post right mastectomy.    ABDOMEN AND PELVIS:    LIVER: Within normal limits. Calcified hepatic granuloma.    BILE DUCTS: No intrahepatic biliary ductal dilatation. Redemonstration of   choledocholithiasis in the ampullary region, unchanged since 12/30/2021.    GALLBLADDER: Cholelithiasis.    SPLEEN: Within normal limits.    PANCREAS: 1 cm pancreatic tail cyst.    ADRENALS: Within normal limits.    KIDNEYS/URETERS: Mildly atrophic left kidney. No hydronephrosis.    BLADDER: Within normal limits.    REPRODUCTIVE ORGANS: Uterus present. There is a 1.4 cm right adnexal cyst.    BOWEL: Percutaneous gastrostomy tube present with intraluminal balloon.   No bowel obstruction. Colonic diverticulosis without diverticulitis.   Interval removal of the right lower quadrant pigtail catheter in the   region of the appendix. There is a residual, multilocular 4.0 x 2.2 cm   collection with peripheral enhancement (series 3, image 84) in the region   of previously perforated appendix. Previously seen periappendiceal fat   stranding has largely resolved.    PERITONEUM: No ascites.    VESSELS: Aortic calcifications. Hypodense lesion noted at the origin of   the SMA which was previously seen but not as well appreciated on prior   noncontrast enhanced study on 12/30/2021. SMA however appears patent.    RETROPERITONEUM/LYMPH NODES: No lymphadenopathy.    ABDOMINAL WALL: . Fat-containing umbilical hernia.    BONES: Remote left proximal humeral fracture status post ORIF. Unchanged   T11 and L1 superior endplate compression deformities.    IMPRESSION:  Limited exam for pulmonary embolism; no central pulmonary embolism with   nondiagnostic evaluation of lobar, segmental and subsegmental branches.   No imaging evidence of right heart strain.    Complete lingular and left lower lobe collapse withsuperimposed   bronchial impaction. Small left pleural effusion. Mild patchy right   apical groundglass may be related to pulmonary edema, infection or   inflammation.    Multilocular 4.0 x 2.2 cm right lower quadrant fluid collection in the   regionof previously perforated appendix, status post drain removal.   Periappendiceal fat stranding has largely resolved.    Left PICC crosses midline and terminates in the right innominate vein,   likely repositioned during the administration of contrast between    imaging and CTA acquisition.    Cholelithiasis and choledocholithiasis. No intrahepatic biliary ductal   dilatation.    MARKO GUAMAN DO; Resident Radiologist  This document has been electronically signed.  SHARATH TURCIOS M.D., Attending Radiologist  This document has been electronically signed. Jan 20 2022 12:48PM  ---------------------------------------------------------------------------------------------------------------  < from: CT Head No Cont (01.20.22 @ 10:35) >    EXAM:  CT BRAIN                          PROCEDURE DATE:  01/20/2022      FINDINGS:    Study is limited by motion.    No hydrocephalus, midline shift, or effacement of the basal cisterns. No   gross evidence for acute intracranial hemorrhage or brain edema.    Complete opacification of the right maxillary sinus and right mastoid air   cells.    IMPRESSION:    Limited by motion.    No gross evidence for acute intracranial hemorrhage or brain edema.    No hydrocephalus or midline shift.    Complete opacification of the right maxillary sinus and right mastoid air   cells. Correlate for the presence of sinusitis/mastoiditis.    LESLI KING MD; Attending Radiologist  This document has been electronically signed. Jan 20 2022 10:38AM  ---------------------------------------------------------------------------------------------------------------

## 2022-01-21 NOTE — PROGRESS NOTE ADULT - ASSESSMENT
86F perforated appendicitis s/p IR drain and removal, now with increased RLQ collection c/f abscess  surgery consulted    Recommendations:  -agree with continuing abx-vanc and invanz until 2/9  -appreciate IR recommendations for RLQ collection, not amenable to drainage at this time  -poor surgical candidate at this time (on BIPAP)  -consider GI for choledocholithiasis seen 12/2021 as well as 1/20 CT      [facility]  [id number]

## 2022-01-21 NOTE — PATIENT PROFILE ADULT - FALL HARM RISK - RISK INTERVENTIONS
Assistance OOB with selected safe patient handling equipment/Assistance with ambulation/Communicate Fall Risk and Risk Factors to all staff, patient, and family/Discuss with provider need for PT consult/Monitor gait and stability/Reinforce activity limits and safety measures with patient and family/Visual Cue: Yellow wristband/Bed in lowest position, wheels locked, appropriate side rails in place/Call bell, personal items and telephone in reach/Instruct patient to call for assistance before getting out of bed or chair/Non-slip footwear when patient is out of bed/[place] to call system/Physically safe environment - no spills, clutter or unnecessary equipment/Purposeful Proactive Rounding/Room/bathroom lighting operational, light cord in reach

## 2022-01-21 NOTE — DIETITIAN INITIAL EVALUATION ADULT. - ORAL INTAKE PTA/DIET HISTORY
pt on tube feeds at nursing home  Glucerna 1.5 60ml/hr. 5pm to 8am.   1350kcal/250ml free water flush via PEG Q4

## 2022-01-22 NOTE — PROGRESS NOTE ADULT - SUBJECTIVE AND OBJECTIVE BOX
afberile    REVIEW OF SYSTEMS:  GEN: no fever,    no chills  RESP: no SOB,   no cough  CVS: no chest pain,   no palpitations  GI: no abdominal pain,   no nausea,   no vomiting,   no constipation,   no diarrhea  : no dysuria,   no frequency  NEURO: no headache,   no dizziness  PSYCH: no depression,   not anxious  Derm : no rash    MEDICATIONS  (STANDING):  acetylcysteine 20%  Inhalation 4 milliLiter(s) Inhalation every 6 hours  albuterol/ipratropium for Nebulization 3 milliLiter(s) Nebulizer every 6 hours  allopurinol 100 milliGRAM(s) Oral daily  amLODIPine   Tablet 10 milliGRAM(s) Oral daily  ascorbic acid 500 milliGRAM(s) Oral daily  atorvastatin 20 milliGRAM(s) Oral at bedtime  chlorhexidine 2% Cloths 1 Application(s) Topical <User Schedule>  ertapenem  IVPB 1000 milliGRAM(s) IV Intermittent every 24 hours  guaiFENesin Oral Liquid (Sugar-Free) 300 milliGRAM(s) Oral every 6 hours  heparin SubCutaneous Injection - Peds 5000 Unit(s) SubCutaneous every 8 hours  levETIRAcetam  Solution 750 milliGRAM(s) Oral two times a day  levothyroxine 75 MICROGram(s) Oral daily  metoprolol tartrate 25 milliGRAM(s) Oral two times a day  multivitamin 1 Tablet(s) Oral daily  sodium chloride 3%  Inhalation 4 milliLiter(s) Inhalation every 6 hours  vancomycin  IVPB 1000 milliGRAM(s) IV Intermittent every 24 hours    MEDICATIONS  (PRN):      Vital Signs Last 24 Hrs  T(C): 36.7 (22 Jan 2022 05:05), Max: 37.1 (21 Jan 2022 12:27)  T(F): 98 (22 Jan 2022 05:05), Max: 98.8 (21 Jan 2022 12:27)  HR: 91 (22 Jan 2022 06:56) (83 - 101)  BP: 117/72 (22 Jan 2022 05:05) (82/58 - 117/72)  BP(mean): --  RR: 19 (22 Jan 2022 05:05) (18 - 20)  SpO2: 98% (22 Jan 2022 06:56) (91% - 98%)  CAPILLARY BLOOD GLUCOSE        I&O's Summary    21 Jan 2022 07:01  -  22 Jan 2022 07:00  --------------------------------------------------------  IN: 500 mL / OUT: 1400 mL / NET: -900 mL        PHYSICAL EXAM:  HEAD:  Atraumatic, Normocephalic  NECK: Supple, No   JVD  CHEST/LUNG:   no     rales,     no,    rhonchi  HEART: Regular rate and rhythm;         murmur  ABDOMEN: Soft, Nontender, ;   EXTREMITIES:     no   edema  NEUROLOGY:  alert    LABS:                        9.4    6.33  )-----------( 191      ( 22 Jan 2022 06:51 )             31.2     01-20    141  |  103  |  32<H>  ----------------------------<  188<H>  4.9   |  24  |  0.62    Ca    9.9      20 Jan 2022 08:53    TPro  7.5  /  Alb  3.8  /  TBili  0.3  /  DBili  x   /  AST  29  /  ALT  16  /  AlkPhos  105  01-20    PT/INR - ( 20 Jan 2022 08:53 )   PT: 12.0 sec;   INR: 1.00 ratio         PTT - ( 20 Jan 2022 08:53 )  PTT:31.0 sec      Urinalysis Basic - ( 20 Jan 2022 14:56 )    Color: Yellow / Appearance: Slightly Turbid / SG: >1.050 / pH: x  Gluc: x / Ketone: Negative  / Bili: Negative / Urobili: Negative   Blood: x / Protein: 100 / Nitrite: Negative   Leuk Esterase: Large / RBC: 7 /hpf / WBC 25 /HPF   Sq Epi: x / Non Sq Epi: 5 /hpf / Bacteria: Few          01-20 @ 08:53  4.5  52      Thyroid Stimulating Hormone, Serum: 2.39 uIU/mL (01-07 @ 09:13)          Consultant(s) Notes Reviewed:      Care Discussed with Consultants/Other Providers:

## 2022-01-22 NOTE — PROGRESS NOTE ADULT - ASSESSMENT
86   year old female    h/o hemorrhagic CVA, has  PEG,  HTN, MI,   HLD, gout, right ca  breast   right Ca breast. s/p mastectomy in 2019,  s/p  sentinel node  localization.  4/4,  were  negative  peg dislodged.  IR   replacements  on 1/3/22   picc  and iv ab  for 6 weeks, per  ID, on  last  visit  s/p rrt   for hypoxia. cxr with complete  collapsed  of  left lung, needing broch, by  pulm magnus mir   s/ p  bronchoscopy , pt  with  tracheomalacia  and  collapsed  airways,  makes  this a  difficult  situation, as there is no good rx for this          *  p/w   sob. acute  resp failure  on Bipap,  from left   lung  collapse  also  on ct, with  ?  infection  afberile,  with normal  wbc  on arrival  *   s/p cva, has  PEG,  npo  ,  on  synthroid, Keppra  ,  *  HTN, on meds  per  card  *  h/o ca breast. /, s/p  R  mastectomy  * obesity, bmi  was   41, now  is  34 in  er  *   h/o bacteremia. on 12/19/21,  Staph epi, meth R,  from perforated  appendicitis  ct  c/a/p, from last  visit   pna, perforated  appendicitis,  ?  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 this will not alter rx. pt  will need   extended  course  of ab     on iv  vanco and ertapenem.  till  2/9/.22  ID dr reagan, and per  surg  and  IR  eval,  no intervnetion  CT  1/20/22, no PE. collection in right side    picc  line  evaluated  by picc  team,    pulm magnus mir, on Proventil,  Mucomyst and   saline  nebs.  pt  with  h/o  tracheomalacia  and  collapsed  airways,  makes  this a  difficult  situation, as there is no good rx for this  given pt;s  mlple co morbidities,  pt is  at risk for  re admissions  follow  vanco  level/  IR  for picc  on sunday  pulm  hoping to  avoid rpt  bronch.  on nocturnal  awaps       per  son, pt is  full code     rad< from: CT Angio Chest PE Protocol w/ IV Cont (01.20.22 @ 10:35) >  IMPRESSION:  Limited exam for pulmonary embolism; no central pulmonary embolism with   nondiagnostic evaluation of lobar, segmental and subsegmental branches.   No imaging evidence of right heart strain.  Complete lingular and left lower lobe collapse withsuperimposed   bronchial impaction. Small left pleural effusion. Mild patchy right   apical groundglass may be related to pulmonary edema, infection or   inflammation.  Multilocular 4.0 x 2.2 cm right lower quadrant fluid collection in the   regionof previously perforated appendix, status post drain removal.   Periappendiceal fat stranding has largely resolved.  Left PICC crosses midline and terminates in the right innominate vein,   likely repositioned during the administration of contrast between    imaging and CTA acquisition.  Cholelithiasis and choledocholithiasis. No intrahepatic biliary ductal   dilatation.  --- End of Report ---  < end of copied text >

## 2022-01-22 NOTE — PROGRESS NOTE ADULT - ATTENDING COMMENTS
Pt with multiple comorbidities.  Poor historian.  No peritoneal findings on abdominal exam.   CT with minimal increase in size of the RLQ abscess (4 cm, it was 3.3 cm on 12/30 at the time of the IR drain removal).  Agree with a course of ABX, duration as per ID.  She is a very poor surgical candidate. Pt with multiple comorbidities including major pulmonary problems.  Poor historian.  No peritoneal findings on abdominal exam.   CT with minimal increase in size of the RLQ abscess (4 cm, it was 3.3 cm on 12/30 at the time of the IR drain removal).  Agree with a course of ABX, duration as per ID.  She is a very poor surgical candidate.   Recommend GI consult for opinion on the choledocholithiasis.

## 2022-01-22 NOTE — PROGRESS NOTE ADULT - SUBJECTIVE AND OBJECTIVE BOX
CARDIOLOGY     PROGRESS  NOTE   ________________________________________________    CHIEF COMPLAINT:Patient is a 86y old  Female who presents with a chief complaint of sob (22 Jan 2022 07:07)  no complain/ events noted with decrease o2 sat.  	  REVIEW OF SYSTEMS:  CONSTITUTIONAL: No fever, weight loss, or fatigue  EYES: No eye pain, visual disturbances, or discharge  ENT:  No difficulty hearing, tinnitus, vertigo; No sinus or throat pain  NECK: No pain or stiffness  RESPIRATORY: No cough, wheezing, chills or hemoptysis; No Shortness of Breath  CARDIOVASCULAR: No chest pain, palpitations, passing out, dizziness, or leg swelling  GASTROINTESTINAL: No abdominal or epigastric pain. No nausea, vomiting, or hematemesis; No diarrhea or constipation. No melena or hematochezia.  GENITOURINARY: No dysuria, frequency, hematuria, or incontinence  NEUROLOGICAL: No headaches, memory loss, loss of strength, numbness, or tremors  SKIN: No itching, burning, rashes, or lesions   LYMPH Nodes: No enlarged glands  ENDOCRINE: No heat or cold intolerance; No hair loss  MUSCULOSKELETAL: No joint pain or swelling; No muscle, back, or extremity pain  PSYCHIATRIC: No depression, anxiety, mood swings, or difficulty sleeping  HEME/LYMPH: No easy bruising, or bleeding gums  ALLERGY AND IMMUNOLOGIC: No hives or eczema	    [ ] All others negative	  [x ] Unable to obtain    PHYSICAL EXAM:  T(C): 36.6 (01-22-22 @ 11:32), Max: 37.1 (01-21-22 @ 12:27)  HR: 99 (01-22-22 @ 11:32) (83 - 101)  BP: 112/55 (01-22-22 @ 11:32) (82/58 - 117/72)  RR: 19 (01-22-22 @ 11:32) (18 - 20)  SpO2: 95% (01-22-22 @ 11:32) (91% - 98%)  Wt(kg): --  I&O's Summary    21 Jan 2022 07:01  -  22 Jan 2022 07:00  --------------------------------------------------------  IN: 1500 mL / OUT: 1400 mL / NET: 100 mL        Appearance: Normal	  HEENT:   Normal oral mucosa, PERRL, EOMI	  Lymphatic: No lymphadenopathy  Cardiovascular: Normal S1 S2, No JVD, + murmurs, No edema  Respiratory: rhonchi	  Psychiatry: A & O x 3, Mood & affect appropriate  Gastrointestinal:  Soft, Non-tender, + BS	  Skin: No rashes, No ecchymoses, No cyanosis	  Neurologic: Non-focal  Extremities: Normal range of motion, No clubbing, cyanosis or edema  Vascular: Peripheral pulses palpable 2+ bilaterally    MEDICATIONS  (STANDING):  acetylcysteine 20%  Inhalation 4 milliLiter(s) Inhalation every 6 hours  albuterol/ipratropium for Nebulization 3 milliLiter(s) Nebulizer every 6 hours  allopurinol 100 milliGRAM(s) Oral daily  amLODIPine   Tablet 10 milliGRAM(s) Oral daily  ascorbic acid 500 milliGRAM(s) Oral daily  atorvastatin 20 milliGRAM(s) Oral at bedtime  chlorhexidine 2% Cloths 1 Application(s) Topical <User Schedule>  ertapenem  IVPB 1000 milliGRAM(s) IV Intermittent every 24 hours  guaiFENesin Oral Liquid (Sugar-Free) 300 milliGRAM(s) Oral every 6 hours  heparin SubCutaneous Injection - Peds 5000 Unit(s) SubCutaneous every 8 hours  levETIRAcetam  Solution 750 milliGRAM(s) Oral two times a day  levothyroxine 75 MICROGram(s) Oral daily  metoprolol tartrate 25 milliGRAM(s) Oral two times a day  multivitamin 1 Tablet(s) Oral daily  sodium chloride 3%  Inhalation 4 milliLiter(s) Inhalation every 6 hours  vancomycin  IVPB 1000 milliGRAM(s) IV Intermittent every 24 hours      TELEMETRY: 	    ECG:  	  RADIOLOGY:  OTHER: 	  	  LABS:	 	    CARDIAC MARKERS:                                9.4    6.33  )-----------( 191      ( 22 Jan 2022 06:51 )             31.2     01-22    144  |  106  |  22  ----------------------------<  126<H>  4.1   |  24  |  0.64    Ca    9.7      22 Jan 2022 06:51      proBNP: Serum Pro-Brain Natriuretic Peptide: 148 pg/mL (01-20 @ 08:53)    Lipid Profile:   HgA1c:   TSH: Thyroid Stimulating Hormone, Serum: 2.39 uIU/mL (01-07 @ 09:13)    < from: TTE with Doppler (w/Cont) (01.21.22 @ 14:08) >  Mitral Valve: Normal mitral valve.  Aortic Valve/Aorta: Calcified trileaflet aortic valve with  decreased opening. Mild aortic regurgitation.  Aortic Root: 3.3 cm.  LVOT diameter: 2.2 cm.  Left Atrium: Normal left atrium.  Left Ventricle: Hyperdynamic left ventricular systolic  function.   Endocardial visualization enhanced with  intravenous injection of Ultrasonic Enhancing Agent  (Definity). Concentric left ventricular hypertrophy.  Right Heart: Normal right atrium. The right ventricle is  not well visualized; grossly normal right ventricular  systolic function. Normal tricuspid valve.  Pericardium/Pleura: Normal pericardium with no pericardial  effusion.  ------------------------------------------------------------------------  Conclusions:  1. Concentric left ventricular hypertrophy.  2. Hyperdynamic left ventricular systolic function.  Endocardial visualization enhanced with intravenous  injection of Ultrasonic Enhancing Agent (Definity).  3. The right ventricle is not well visualized; grossly  normal right ventricular systolic function.  Pt refused to proceed with exam.  Limited Doppler study.  No trans aortic gradients.  Unable to rule out endocarditis.      Assessment and plan  ---------------------------   85 y/o female, hx of CVA, MI, breast CA, COPD, not on home 02, s/p  recent  perforated  appendix / abscess,  HN, HLD, hypothyroid,  obesity relatively  bed bound , gout   BIBEMS from [facility] due to SOB., started this morning   patient initial 02 sat was 84% placed on NRB and improved to 93%.  has a pic line to left arm ,  and  peg tube  pt had a long course of admission sec to mucus plug and collapsed lung, s/p bronchoscopy.  ct angio of the chest noted, sig abnormality with collapse of the lung  small pleural effusion, doubt chf  agree with iv abx  dvt prophylaxis  pulmonary noted  ecg noted with old inferior wall mi, no acute changes  tele noted, nsr, no sig arrythmia  continue current meds  ?repeat chest x ray as per pulmonary  continue current bp meds  echo noted  will increase beta blocker as tolerated

## 2022-01-22 NOTE — PROGRESS NOTE ADULT - SUBJECTIVE AND OBJECTIVE BOX
24h Events:  No acute events overnight.    Subjective:   Patient seen at bedside this AM.     Objective:  Vital Signs  T(C): 37.1 (01-21 @ 21:34), Max: 37.1 (01-21 @ 12:27)  HR: 83 (01-22 @ 01:01) (83 - 103)  BP: 102/68 (01-22 @ 00:00) (82/58 - 102/68)  RR: 20 (01-21 @ 23:32) (18 - 20)  SpO2: 93% (01-22 @ 01:01) (91% - 97%)  01-20-22 @ 07:01  -  01-21-22 @ 07:00  --------------------------------------------------------  IN:  Total IN: 0 mL    OUT:    Voided (mL): 900 mL  Total OUT: 900 mL    Total NET: -900 mL      01-21-22 @ 07:01  -  01-22-22 @ 04:22  --------------------------------------------------------  IN:  Total IN: 0 mL    OUT:    Voided (mL): 500 mL    Voided (mL): 800 mL  Total OUT: 1300 mL    Total NET: -1300 mL          Physical Exam:  General: No acute distress  Respiratory: Nonlabored  Cardiovascular: appears well perfused  Abdominal: Soft, nondistended, nontender. No rebound or guarding. No organomegaly, no palpable mass.  bruising  Extremities: Warm    Labs:                        9.6    9.83  )-----------( 183      ( 20 Jan 2022 08:53 )             32.3   01-20    141  |  103  |  32<H>  ----------------------------<  188<H>  4.9   |  24  |  0.62    Ca    9.9      20 Jan 2022 08:53    TPro  7.5  /  Alb  3.8  /  TBili  0.3  /  DBili  x   /  AST  29  /  ALT  16  /  AlkPhos  105  01-20    CAPILLARY BLOOD GLUCOSE          Imaging:

## 2022-01-23 NOTE — PROGRESS NOTE ADULT - SUBJECTIVE AND OBJECTIVE BOX
afberile/ hypoxic    REVIEW OF SYSTEMS:  GEN: no fever,    no chills  RESP: no SOB,   no cough  CVS: no chest pain,   no palpitations  GI: no abdominal pain,   no nausea,   no vomiting,   no constipation,   no diarrhea  : no dysuria,   no frequency  NEURO: no headache,   no dizziness  PSYCH: no depression,   not anxious  Derm : no rash    MEDICATIONS  (STANDING):  acetylcysteine 20%  Inhalation 4 milliLiter(s) Inhalation every 6 hours  albuterol/ipratropium for Nebulization 3 milliLiter(s) Nebulizer every 6 hours  allopurinol 100 milliGRAM(s) Oral daily  amLODIPine   Tablet 10 milliGRAM(s) Oral daily  ascorbic acid 500 milliGRAM(s) Oral daily  atorvastatin 20 milliGRAM(s) Oral at bedtime  chlorhexidine 2% Cloths 1 Application(s) Topical <User Schedule>  ertapenem  IVPB 1000 milliGRAM(s) IV Intermittent every 24 hours  guaiFENesin Oral Liquid (Sugar-Free) 300 milliGRAM(s) Oral every 6 hours  heparin SubCutaneous Injection - Peds 5000 Unit(s) SubCutaneous every 8 hours  levETIRAcetam  Solution 750 milliGRAM(s) Oral two times a day  levothyroxine 75 MICROGram(s) Oral daily  metoprolol tartrate 25 milliGRAM(s) Oral two times a day  multivitamin 1 Tablet(s) Oral daily  sodium chloride 3%  Inhalation 4 milliLiter(s) Inhalation every 6 hours  vancomycin  IVPB 1000 milliGRAM(s) IV Intermittent every 24 hours    MEDICATIONS  (PRN):  acetaminophen    Suspension .. 650 milliGRAM(s) Oral every 6 hours PRN Temp greater or equal to 38C (100.4F), Mild Pain (1 - 3)      Vital Signs Last 24 Hrs  T(C): 36.6 (23 Jan 2022 05:22), Max: 36.9 (22 Jan 2022 21:08)  T(F): 97.9 (23 Jan 2022 05:22), Max: 98.4 (22 Jan 2022 21:08)  HR: 93 (23 Jan 2022 05:22) (93 - 101)  BP: 122/65 (23 Jan 2022 05:22) (112/55 - 132/73)  BP(mean): --  RR: 20 (23 Jan 2022 05:22) (19 - 20)  SpO2: 90% (23 Jan 2022 05:22) (87% - 95%)  CAPILLARY BLOOD GLUCOSE        I&O's Summary    22 Jan 2022 07:01  -  23 Jan 2022 07:00  --------------------------------------------------------  IN: 330 mL / OUT: 650 mL / NET: -320 mL        PHYSICAL EXAM:  HEAD:  Atraumatic, Normocephalic  NECK: Supple, No   JVD  CHEST/LUNG:   no     rales,     no,    rhonchi  HEART: Regular rate and rhythm;         murmur  ABDOMEN: Soft, Nontender, ;   EXTREMITIES:     no   edema  NEUROLOGY:  alert    LABS:                        9.4    6.33  )-----------( 191      ( 22 Jan 2022 06:51 )             31.2     01-22    144  |  106  |  22  ----------------------------<  126<H>  4.1   |  24  |  0.64    Ca    9.7      22 Jan 2022 06:51                      Thyroid Stimulating Hormone, Serum: 2.39 uIU/mL (01-07 @ 09:13)          Consultant(s) Notes Reviewed:      Care Discussed with Consultants/Other Providers:

## 2022-01-23 NOTE — PROGRESS NOTE ADULT - SUBJECTIVE AND OBJECTIVE BOX
CARDIOLOGY     PROGRESS  NOTE   ________________________________________________    CHIEF COMPLAINT:Patient is a 86y old  Female who presents with a chief complaint of sob (23 Jan 2022 07:28)  still with sob.  	  REVIEW OF SYSTEMS:  CONSTITUTIONAL: No fever, weight loss, or fatigue  EYES: No eye pain, visual disturbances, or discharge  ENT:  No difficulty hearing, tinnitus, vertigo; No sinus or throat pain  NECK: No pain or stiffness  RESPIRATORY: No cough, wheezing, chills or hemoptysis; No Shortness of Breath  CARDIOVASCULAR: No chest pain, palpitations, passing out, dizziness, or leg swelling  GASTROINTESTINAL: No abdominal or epigastric pain. No nausea, vomiting, or hematemesis; No diarrhea or constipation. No melena or hematochezia.  GENITOURINARY: No dysuria, frequency, hematuria, or incontinence  NEUROLOGICAL: No headaches, memory loss, loss of strength, numbness, or tremors  SKIN: No itching, burning, rashes, or lesions   LYMPH Nodes: No enlarged glands  ENDOCRINE: No heat or cold intolerance; No hair loss  MUSCULOSKELETAL: No joint pain or swelling; No muscle, back, or extremity pain  PSYCHIATRIC: No depression, anxiety, mood swings, or difficulty sleeping  HEME/LYMPH: No easy bruising, or bleeding gums  ALLERGY AND IMMUNOLOGIC: No hives or eczema	    [ ] All others negative	  [ ] Unable to obtain    PHYSICAL EXAM:  T(C): 36.6 (01-23-22 @ 05:22), Max: 36.9 (01-22-22 @ 21:08)  HR: 90 (01-23-22 @ 09:41) (90 - 101)  BP: 122/65 (01-23-22 @ 05:22) (112/55 - 132/73)  RR: 18 (01-23-22 @ 09:39) (18 - 20)  SpO2: 97% (01-23-22 @ 09:41) (87% - 97%)  Wt(kg): --  I&O's Summary    22 Jan 2022 07:01  -  23 Jan 2022 07:00  --------------------------------------------------------  IN: 330 mL / OUT: 650 mL / NET: -320 mL    23 Jan 2022 07:01  -  23 Jan 2022 09:45  --------------------------------------------------------  IN: 120 mL / OUT: 0 mL / NET: 120 mL        Appearance: Normal	  HEENT:   Normal oral mucosa, PERRL, EOMI	  Lymphatic: No lymphadenopathy  Cardiovascular: Normal S1 S2, No JVD, + murmurs, No edema  Respiratory: bl rhonchi r>l  Psychiatry: A & O x 3, Mood & affect appropriate  Gastrointestinal:  Soft, Non-tender, + BS	  Skin: No rashes, No ecchymoses, No cyanosis	  Neurologic: Non-focal  Extremities: Normal range of motion, No clubbing, cyanosis or edema  Vascular: Peripheral pulses palpable 2+ bilaterally    MEDICATIONS  (STANDING):  acetylcysteine 20%  Inhalation 4 milliLiter(s) Inhalation every 6 hours  albuterol/ipratropium for Nebulization 3 milliLiter(s) Nebulizer every 6 hours  allopurinol 100 milliGRAM(s) Oral daily  amLODIPine   Tablet 10 milliGRAM(s) Oral daily  ascorbic acid 500 milliGRAM(s) Oral daily  atorvastatin 20 milliGRAM(s) Oral at bedtime  chlorhexidine 2% Cloths 1 Application(s) Topical <User Schedule>  ertapenem  IVPB 1000 milliGRAM(s) IV Intermittent every 24 hours  guaiFENesin Oral Liquid (Sugar-Free) 300 milliGRAM(s) Oral every 6 hours  heparin SubCutaneous Injection - Peds 5000 Unit(s) SubCutaneous every 8 hours  levETIRAcetam  Solution 750 milliGRAM(s) Oral two times a day  levothyroxine 75 MICROGram(s) Oral daily  metoprolol tartrate 25 milliGRAM(s) Oral two times a day  multivitamin 1 Tablet(s) Oral daily  sodium chloride 3%  Inhalation 4 milliLiter(s) Inhalation every 6 hours  vancomycin  IVPB 1000 milliGRAM(s) IV Intermittent every 24 hours      TELEMETRY: 	    ECG:  	  RADIOLOGY:  OTHER: 	  	  LABS:	 	    CARDIAC MARKERS:                                9.4    6.33  )-----------( 191      ( 22 Jan 2022 06:51 )             31.2     01-22    144  |  106  |  22  ----------------------------<  126<H>  4.1   |  24  |  0.64    Ca    9.7      22 Jan 2022 06:51      proBNP: Serum Pro-Brain Natriuretic Peptide: 148 pg/mL (01-20 @ 08:53)    Lipid Profile:   HgA1c:   TSH: Thyroid Stimulating Hormone, Serum: 2.39 uIU/mL (01-07 @ 09:13)        < from: Xray Chest 1 View- PORTABLE-Routine (Xray Chest 1 View- PORTABLE-Routine .) (01.21.22 @ 18:46) >  Distal end of left PICC now along the course of the right subclavian   vein. Stable cardiomediastinal silhouette and left lower lung   consolidation. No pleural effusion or pneumothorax.    1/21/2022, 6:39 PM:    Stable findings from one day prior.    IMPRESSION:    Left PICC tip at the level of the right subclavian vein. Adjustment is   recommended. Stable pulmonary findings.        Assessment and plan  ---------------------------   85 y/o female, hx of CVA, MI, breast CA, COPD, not on home 02, s/p  recent  perforated  appendix / abscess,  HN, HLD, hypothyroid,  obesity relatively  bed bound , gout   BIBEMS from [facility] due to SOB., started this morning   patient initial 02 sat was 84% placed on NRB and improved to 93%.  has a pic line to left arm ,  and  peg tube  pt had a long course of admission sec to mucus plug and collapsed lung, s/p bronchoscopy.  ct angio of the chest noted, sig abnormality with collapse of the lung  small pleural effusion, doubt chf  agree with iv abx  dvt prophylaxis  pulmonary noted  ecg noted with old inferior wall mi, no acute changes  tele noted, nsr, no sig arrythmia  continue current meds  continue current bp meds  echo noted  will increase beta blocker as tolerated  remained in NSR, sob sec to underlying lung disease  dvt prophylaxis

## 2022-01-23 NOTE — PROGRESS NOTE ADULT - ASSESSMENT
86   year old female    h/o hemorrhagic CVA, has  PEG,  HTN, MI,   HLD, gout, right ca  breast   right Ca breast. s/p mastectomy in 2019,  s/p  sentinel node  localization.  4/4,  were  negative  peg dislodged.  IR   replacements  on 1/3/22   picc  and iv ab  for 6 weeks, per  ID, on  last  visit  s/p rrt   for hypoxia. cxr with complete  collapsed  of  left lung, needing broch, by  pulm magnus mir   s/ p  bronchoscopy , pt  with  tracheomalacia  and  collapsed  airways,  makes  this a  difficult  situation, as there is no good rx for this          *  p/w   sob. acute  resp failure  on Bipap,  from left   lung  collapse  also  on ct, with  ?  infection  afberile,  with normal  wbc  on arrival  *   s/p cva, has  PEG,  npo  ,  on  synthroid, Keppra  ,  *  HTN, on meds  per  card  *  h/o ca breast. /, s/p  R  mastectomy  * obesity, bmi  was   41, now  is  34 in  er  *   h/o bacteremia. on 12/19/21,  Staph epi, meth R,  from perforated  appendicitis  ct  c/a/p, from last  visit   pna, perforated  appendicitis,  ?  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 this will not alter rx. pt  will need   extended  course  of ab     on iv  vanco and ertapenem.  till  2/9/.22  ID dr reagan, and per  surg  and  IR  eval,  no intervnetion  CT  1/20/22, no PE. collection in right side    picc  line  evaluated  by picc  team,    pulm magnus mir, on Proventil,  Mucomyst and   saline  nebs.  pt  with  h/o  tracheomalacia  and  collapsed  airways,  makes  this a  difficult  situation, as there is no good rx for this  given pt;s  mlple co morbidities,  pt is  at risk for  re admissions  IR  for picc  on sunday  pulm  hoping to  avoid rpt  bronch.  on nocturnal  awaps  on hi flow, remains hypoxic       per  son, pt is  full code     rad< from: CT Angio Chest PE Protocol w/ IV Cont (01.20.22 @ 10:35) >  IMPRESSION:  Limited exam for pulmonary embolism; no central pulmonary embolism with   nondiagnostic evaluation of lobar, segmental and subsegmental branches.   No imaging evidence of right heart strain.  Complete lingular and left lower lobe collapse withsuperimposed   bronchial impaction. Small left pleural effusion. Mild patchy right   apical groundglass may be related to pulmonary edema, infection or   inflammation.  Multilocular 4.0 x 2.2 cm right lower quadrant fluid collection in the   regionof previously perforated appendix, status post drain removal.   Periappendiceal fat stranding has largely resolved.  Left PICC crosses midline and terminates in the right innominate vein,   likely repositioned during the administration of contrast between    imaging and CTA acquisition.  Cholelithiasis and choledocholithiasis. No intrahepatic biliary ductal   dilatation.  --- End of Report ---  < end of copied text >

## 2022-01-24 NOTE — PROGRESS NOTE ADULT - SUBJECTIVE AND OBJECTIVE BOX
CARDIOLOGY     PROGRESS  NOTE   ________________________________________________    CHIEF COMPLAINT:Patient is a 86y old  Female who presents with a chief complaint of sob (24 Jan 2022 07:22)  no complain.  	  REVIEW OF SYSTEMS:  CONSTITUTIONAL: No fever, weight loss, or fatigue  EYES: No eye pain, visual disturbances, or discharge  ENT:  No difficulty hearing, tinnitus, vertigo; No sinus or throat pain  NECK: No pain or stiffness  RESPIRATORY: No cough, wheezing, chills or hemoptysis; + Shortness of Breath  CARDIOVASCULAR: No chest pain, palpitations, passing out, dizziness, or leg swelling  GASTROINTESTINAL: No abdominal or epigastric pain. No nausea, vomiting, or hematemesis; No diarrhea or constipation. No melena or hematochezia.  GENITOURINARY: No dysuria, frequency, hematuria, or incontinence  NEUROLOGICAL: No headaches, memory loss, loss of strength, numbness, or tremors  SKIN: No itching, burning, rashes, or lesions   LYMPH Nodes: No enlarged glands  ENDOCRINE: No heat or cold intolerance; No hair loss  MUSCULOSKELETAL: No joint pain or swelling; No muscle, back, or extremity pain  PSYCHIATRIC: No depression, anxiety, mood swings, or difficulty sleeping  HEME/LYMPH: No easy bruising, or bleeding gums  ALLERGY AND IMMUNOLOGIC: No hives or eczema	    [ ] All others negative	  [ ] Unable to obtain    PHYSICAL EXAM:  T(C): 37.1 (01-24-22 @ 04:59), Max: 37.1 (01-24-22 @ 04:59)  HR: 96 (01-24-22 @ 05:29) (82 - 98)  BP: 133/75 (01-24-22 @ 04:59) (97/54 - 136/74)  RR: 18 (01-24-22 @ 04:59) (18 - 20)  SpO2: 95% (01-24-22 @ 05:29) (90% - 98%)  Wt(kg): --  I&O's Summary    23 Jan 2022 07:01  -  24 Jan 2022 07:00  --------------------------------------------------------  IN: 120 mL / OUT: 1000 mL / NET: -880 mL        Appearance: Normal	  HEENT:   Normal oral mucosa, PERRL, EOMI	  Lymphatic: No lymphadenopathy  Cardiovascular: Normal S1 S2, No JVD, + murmurs, No edema  Respiratory: rhonchi  Psychiatry: A & O x 3, Mood & affect appropriate  Gastrointestinal:  Soft, Non-tender, + BS	  Skin: No rashes, No ecchymoses, No cyanosis	  Neurologic: Non-focal  Extremities: Normal range of motion, No clubbing, cyanosis or edema  Vascular: Peripheral pulses palpable 2+ bilaterally    MEDICATIONS  (STANDING):  acetylcysteine 20%  Inhalation 4 milliLiter(s) Inhalation every 6 hours  albuterol/ipratropium for Nebulization 3 milliLiter(s) Nebulizer every 6 hours  allopurinol 100 milliGRAM(s) Oral daily  amLODIPine   Tablet 10 milliGRAM(s) Oral daily  ascorbic acid 500 milliGRAM(s) Oral daily  atorvastatin 20 milliGRAM(s) Oral at bedtime  chlorhexidine 2% Cloths 1 Application(s) Topical <User Schedule>  ertapenem  IVPB 1000 milliGRAM(s) IV Intermittent every 24 hours  guaiFENesin Oral Liquid (Sugar-Free) 300 milliGRAM(s) Oral every 6 hours  heparin SubCutaneous Injection - Peds 5000 Unit(s) SubCutaneous every 8 hours  levETIRAcetam  Solution 750 milliGRAM(s) Oral two times a day  levothyroxine 75 MICROGram(s) Oral daily  metoprolol tartrate 25 milliGRAM(s) Oral two times a day  multivitamin 1 Tablet(s) Oral daily  sodium chloride 3%  Inhalation 4 milliLiter(s) Inhalation every 6 hours  vancomycin  IVPB 1000 milliGRAM(s) IV Intermittent every 24 hours      TELEMETRY: 	    ECG:  	  RADIOLOGY:  OTHER: 	  	  LABS:	 	    CARDIAC MARKERS:                  proBNP: Serum Pro-Brain Natriuretic Peptide: 148 pg/mL (01-20 @ 08:53)    Lipid Profile:   HgA1c:   TSH: Thyroid Stimulating Hormone, Serum: 2.39 uIU/mL (01-07 @ 09:13)    < from: Xray Chest 1 View- PORTABLE-Routine (Xray Chest 1 View- PORTABLE-Routine .) (01.21.22 @ 18:46) >  Left PICC tip at the level of the right subclavian vein. Adjustment is   recommended. Stable pulmonary findings.        Assessment and plan  ---------------------------  ---------------------------   87 y/o female, hx of CVA, MI, breast CA, COPD, not on home 02, s/p  recent  perforated  appendix / abscess,  HN, HLD, hypothyroid,  obesity relatively  bed bound , gout   BIBEMS from [facility] due to SOB., started this morning   patient initial 02 sat was 84% placed on NRB and improved to 93%.  has a pic line to left arm ,  and  peg tube  pt had a long course of admission sec to mucus plug and collapsed lung, s/p bronchoscopy.  ct angio of the chest noted, sig abnormality with collapse of the lung  small pleural effusion, doubt chf  agree with iv abx  dvt prophylaxis  pulmonary noted  ecg noted with old inferior wall mi, no acute changes  tele noted, nsr, no sig arrythmia  continue current meds  continue current bp meds  echo noted  will increase beta blocker as tolerated  remained in NSR, sob sec to underlying lung disease  dvt prophylaxis  ?chest x ray tomorrow  continue abx, fu blood cultures

## 2022-01-24 NOTE — PROGRESS NOTE ADULT - SUBJECTIVE AND OBJECTIVE BOX
afberile    REVIEW OF SYSTEMS:  GEN: no fever,    no chills  RESP: no SOB,   no cough  CVS: no chest pain,   no palpitations  GI: no abdominal pain,   no nausea,   no vomiting,   no constipation,   no diarrhea  : no dysuria,   no frequency  NEURO: no headache,   no dizziness  PSYCH: no depression,   not anxious  Derm : no rash    MEDICATIONS  (STANDING):  acetylcysteine 20%  Inhalation 4 milliLiter(s) Inhalation every 6 hours  albuterol/ipratropium for Nebulization 3 milliLiter(s) Nebulizer every 6 hours  allopurinol 100 milliGRAM(s) Oral daily  amLODIPine   Tablet 10 milliGRAM(s) Oral daily  ascorbic acid 500 milliGRAM(s) Oral daily  atorvastatin 20 milliGRAM(s) Oral at bedtime  chlorhexidine 2% Cloths 1 Application(s) Topical <User Schedule>  ertapenem  IVPB 1000 milliGRAM(s) IV Intermittent every 24 hours  guaiFENesin Oral Liquid (Sugar-Free) 300 milliGRAM(s) Oral every 6 hours  heparin SubCutaneous Injection - Peds 5000 Unit(s) SubCutaneous every 8 hours  levETIRAcetam  Solution 750 milliGRAM(s) Oral two times a day  levothyroxine 75 MICROGram(s) Oral daily  metoprolol tartrate 25 milliGRAM(s) Oral two times a day  multivitamin 1 Tablet(s) Oral daily  sodium chloride 3%  Inhalation 4 milliLiter(s) Inhalation every 6 hours  vancomycin  IVPB 1000 milliGRAM(s) IV Intermittent every 24 hours    MEDICATIONS  (PRN):  acetaminophen    Suspension .. 650 milliGRAM(s) Oral every 6 hours PRN Temp greater or equal to 38C (100.4F), Mild Pain (1 - 3)      Vital Signs Last 24 Hrs  T(C): 37.1 (24 Jan 2022 04:59), Max: 37.1 (24 Jan 2022 04:59)  T(F): 98.7 (24 Jan 2022 04:59), Max: 98.7 (24 Jan 2022 04:59)  HR: 96 (24 Jan 2022 05:29) (82 - 98)  BP: 133/75 (24 Jan 2022 04:59) (97/54 - 136/74)  BP(mean): --  RR: 18 (24 Jan 2022 04:59) (18 - 20)  SpO2: 95% (24 Jan 2022 05:29) (90% - 98%)  CAPILLARY BLOOD GLUCOSE        I&O's Summary    23 Jan 2022 07:01  -  24 Jan 2022 07:00  --------------------------------------------------------  IN: 120 mL / OUT: 1000 mL / NET: -880 mL        PHYSICAL EXAM:  HEAD:  Atraumatic, Normocephalic  NECK: Supple, No   JVD  CHEST/LUNG:   no     rales,     no,    rhonchi  HEART: Regular rate and rhythm;         murmur  ABDOMEN: Soft, Nontender, ;   EXTREMITIES:    no    edema  NEUROLOGY:  alert    LABS:                          Thyroid Stimulating Hormone, Serum: 2.39 uIU/mL (01-07 @ 09:13)          Consultant(s) Notes Reviewed:      Care Discussed with Consultants/Other Providers:

## 2022-01-24 NOTE — PROGRESS NOTE ADULT - SUBJECTIVE AND OBJECTIVE BOX
[facility] - PROGRESS NOTE    CHIEF COMPLAINT: acute hypoxic respiratory failure; mucous plugging; left lung atelectasis; weak cough; dysphagia; tracheobronchomalacia; PEG;     INTERVAL HISTORY: sleeping - easily awoken - slow mentation but awake and alert x 3; made NPO in anticipation of a bronchoscopy (?); no shortness of breath or hypoxemia on a high flow nasal canula; wore AVAPS overnight; no cough, sputum production, hemoptysis, chest congestion or wheeze; no fevers, chills or sweats; no chest pain/pressure or palpitations; severe knee pain has resolved with Tylenol and repositioning off the bed rail    REVIEW OF SYSTEMS:  Constitutional: As per interval history  HEENT: Within normal limits  CV: As per interval history  Resp: As per interval history  GI: dysphagia  : Within normal limits  Musculoskeletal: Within normal limits  Skin: Within normal limits  Neurological: CVA - > left sided weakness/plegia  Psychiatric: Within normal limits  Endocrine: Within normal limits  Hematologic/Lymphatic: Within normal limits  Allergic/Immunologic: Within normal limits    MEDICATIONS:     Pulmonary "  acetylcysteine 20%  Inhalation 4 milliLiter(s) Inhalation every 6 hours  albuterol/ipratropium for Nebulization 3 milliLiter(s) Nebulizer every 6 hours  guaiFENesin Oral Liquid (Sugar-Free) 300 milliGRAM(s) Oral every 6 hours  sodium chloride 3%  Inhalation 4 milliLiter(s) Inhalation every 6 hours    Anti-microbials:  ertapenem  IVPB 1000 milliGRAM(s) IV Intermittent every 24 hours  vancomycin  IVPB 1000 milliGRAM(s) IV Intermittent every 24 hours    Cardiovascular:  amLODIPine   Tablet 10 milliGRAM(s) Oral daily  metoprolol tartrate 25 milliGRAM(s) Oral two times a day    Other:  allopurinol 100 milliGRAM(s) Oral daily  ascorbic acid 500 milliGRAM(s) Oral daily  atorvastatin 20 milliGRAM(s) Oral at bedtime  chlorhexidine 2% Cloths 1 Application(s) Topical <User Schedule>  heparin SubCutaneous Injection - Peds 5000 Unit(s) SubCutaneous every 8 hours  levETIRAcetam  Solution 750 milliGRAM(s) Oral two times a day  levothyroxine 75 MICROGram(s) Oral daily  multivitamin 1 Tablet(s) Oral daily    MEDICATIONS  (PRN):  acetaminophen    Suspension .. 650 milliGRAM(s) Oral every 6 hours PRN Temp greater or equal to 38C (100.4F), Mild Pain (1 - 3)    OBJECTIVE:    I&O's Detail    23 Jan 2022 07:01  -  24 Jan 2022 07:00  --------------------------------------------------------  IN:    Enteral Tube Flush: 120 mL  Total IN: 120 mL    OUT:    Voided (mL): 1000 mL  Total OUT: 1000 mL    Total NET: -880 mL    PHYSICAL EXAM:       ICU Vital Signs Last 24 Hrs  T(C): 37.1 (24 Jan 2022 04:59), Max: 37.1 (24 Jan 2022 04:59)  T(F): 98.7 (24 Jan 2022 04:59), Max: 98.7 (24 Jan 2022 04:59)  HR: 96 (24 Jan 2022 05:29) (82 - 98)  BP: 133/75 (24 Jan 2022 04:59) (97/54 - 136/74)  BP(mean): --  ABP: --  ABP(mean): --  RR: 18 (24 Jan 2022 04:59) (18 - 20)  SpO2: 95% (24 Jan 2022 05:29) (90% - 98%) on high flow nasal canula     General: Awake. Alert. Cooperative. No distress Appears stated age.	  HEENT: Atraumatic. Normocephalic. Anicteric. Normal oral mucosa. PERRL. EOMI. High flow nasal canula.  Neck: Supple. Trachea midline. Thyroid without enlargement/tenderness/nodules. No carotid bruit. No JVD. Short and wide  Cardiovascular: Regular rate and rhythm. S1 S2 normal. III/VI systolic murmur  Respiratory: Respirations unlabored. Improved breath sounds left hemithorax. No curvature.  Abdomen: Soft. Non-tender. Non-distended. No organomegaly. No masses. Normal bowel sounds. Obese, PEG.  Extremities: Warm to touch. No clubbing or cyanosis. No pedal edema. LUE PICC line  Pulses: 2+ peripheral pulses all extremities.	  Skin: Normal skin color. No rashes or lesions. No ecchymoses. No cyanosis. Warm to touch.  Lymph Nodes: Cervical, supraclavicular and axillary nodes normal  Neurological: Left lower extremity plegia with contraction. Left upper extremity is quite weak. A and O x 3  Psychiatry: Calm    LABS:      CBC    WBC  6.33 <==, 9.83 <==    Hemoglobin  9.4 <<==, 9.6 <<==    Hematocrit  31.2 <==, 32.3 <==    Platelets  191 <==, 183 <==      144  |  106  |  22  ----------------------------<  126<H>    01-22  4.1   |  24  |  0.64      LYTES    sodium  144 <==, 141 <==    potassium   4.1 <==, 4.9 <==    chloride  106 <==, 103 <==    carbon dioxide  24 <==, 24 <==    =============================================================================================  RENAL FUNCTION:    Creatinine:   0.64  <<==, 0.62  <<==    BUN:   22 <==, 32 <==    ============================================================================================    calcium   9.7 <==, 9.9 <==    ============================================================================================  LFTs    AST:   29 <==     ALT:  16  <==     AP:  105  <=    Bili:  0.3  <=    PT/INR - ( 20 Jan 2022 08:53 )   PT: 12.0 sec;   INR: 1.00 ratio      PTT - ( 20 Jan 2022 08:53 )  PTT:31.0 sec    Venous Blood Gas:  01-20 @ 08:53  7.34/55/52/30/83.0  VBG Lactate: 1.7    Serum Pro-Brain Natriuretic Peptide: 148 pg/mL (01-20 @ 08:53)    CARDIAC MARKERS ( 20 Jan 2022 08:53 )  CPK x     /CKMB x     /CKMB Units x        troponin 21 ng/L    < from: Transthoracic Echocardiogram (12.21.21 @ 14:39) >    Patient name: FRANKI MEHTA  YOB: 1935   Age: 86 (F)   MR#: [id number]  Study Date: 12/21/2021  Location: [address] [place], [place] [id number]: [facility]  Study quality: Technically difficult/Limited  Referring Physician: Edmond Almazan MD  Blood Pressure: 134/77 mmHg  Height: 163 cm  Weight: 109 kg  BSA: 2.1 m2  Heart Rate: 105 mmHg  ------------------------------------------------------------------------  PROCEDURE: Transthoracic echocardiogram with 2-D, M-Mode  and complete spectral andcolor flow Doppler.  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 No segmental  wall motion abnormalities.  Moderate aortic stenosis.  There may be a vegetation on the left or non-coronary cusp  of the aortic valve. Consider transesophageal  echocardiography.  ------------------------------------------------------------------------  Confirmed on  12/22/2021 - 10:32:40 by Rahul Correa M.D.  ------------------------------------------------------------------------    MICROBIOLOGY:     Respiratory Viral Panel with COVID-19 by RYAN (01.20.22 @ 08:52)   Rapid RVP Result: [facility]   SARS-CoV-2: [facility]    Urinalysis Basic - ( 20 Jan 2022 14:56 )    Color: Yellow / Appearance: Slightly Turbid / SG: >1.050 / pH: x  Gluc: x / Ketone: Negative  / Bili: Negative / Urobili: Negative   Blood: x / Protein: 100 / Nitrite: Negative   Leuk Esterase: Large / RBC: 7 /hpf / WBC 25 /HPF   Sq Epi: x / Non Sq Epi: 5 /hpf / Bacteria: Few    Culture - Bronchial (01.06.22 @ 18:48)   Culture Results:   Normal Respiratory Regina present     Culture - Abscess with Gram Stain (12.23.21 @ 18:49)   Specimen Source: .Abscess abdominal abscess   Culture Results:   Few Escherichia coli   Few Morganella morganii   Moderate Enterococcus avium   Numerous Bacteroides thetaiotamcron group "Susceptibilities not performed"   Numerous Clostridium ramosum "Susceptibilities not performed"     Culture - Blood (12.19.21 @ 14:24)   Culture Results:   Growth in aerobic bottle: Staphylococcus epidermidis   Organism Identification: Staphylococcus epidermidis     Culture - Blood (12.19.21 @ 14:24)   Gram Stain:   Growth in aerobic bottle: Gram Positive Cocci in Clusters   Growth in anaerobic bottle: Gram Positive Cocci in Clusters   - Staphylococcus epidermidis, Methicillin resistant: Detec     RADIOLOGY:  [x] Chest radiographs reviewed and interpreted by me    CXR 1/23 "to my eye" -> partial reexpansion of the left upper lobe  ---------------------------------------------------------------------------------------------------------------  < from: CT Abdomen and Pelvis w/ IV Cont (01.20.22 @ 10:35) >    EXAM:  CT ABDOMEN AND PELVIS IC                        [facility]                          PROCEDURE DATE:  01/20/2022      FINDINGS:    CHEST:    PULMONARY ANGIOGRAM: Limited study secondary to extensive respiratory   motion artifact. No evidence of large central pulmonary embolism, with   limited evaluation of lobar, segmental and subsegmental branches. The   pulmonary artery is enlarged measuring 3.3 cm, nonspecific although can   be seen with pulmonary hypertension.    MEDIASTINUM/LYMPH NODES: No mediastinal or hilar lymphadenopathy. Small   hiatal hernia.    AIRWAYS/LUNGS/PLEURA: Degraded by respiratory motion artifact. There is   partial left upper lobe and complete lingular and left lower lobe   collapse with superimposed bronchial impaction. There is a small left   pleural effusion with intrafissural extension. There are patchy   groundglass opacities at the right apex, likely infectious/inflammatory.   No pneumothorax. Mild subpleural reticulation along the right middle lobe   may be secondary to right breast/chest wall radiotherapy.    HEART/VASCULATURE: Heart size is normal. No pericardial effusion. Mild   aortic valvular and mitral annular calcification. A left PICC is present   which has repositioned between the acquisition of the  imaging   (terminating in the SVC on ) and the chest CTA, now crossing midline   into the right innominate vein.    CHEST WALL AND LOWER NECK: The thyroid is incompletely characterized on   this study. Status post right mastectomy.    ABDOMEN AND PELVIS:    LIVER: Within normal limits. Calcified hepatic granuloma.    BILE DUCTS: No intrahepatic biliary ductal dilatation. Redemonstration of   choledocholithiasis in the ampullary region, unchanged since 12/30/2021.    GALLBLADDER: Cholelithiasis.    SPLEEN: Within normal limits.    PANCREAS: 1 cm pancreatic tail cyst.    ADRENALS: Within normal limits.    KIDNEYS/URETERS: Mildly atrophic left kidney. No hydronephrosis.    BLADDER: Within normal limits.    REPRODUCTIVE ORGANS: Uterus present. There is a 1.4 cm right adnexal cyst.    BOWEL: Percutaneous gastrostomy tube present with intraluminal balloon.   No bowel obstruction. Colonic diverticulosis without diverticulitis.   Interval removal of the right lower quadrant pigtail catheter in the   region of the appendix. There is a residual, multilocular 4.0 x 2.2 cm   collection with peripheral enhancement (series 3, image 84) in the region   of previously perforated appendix. Previously seen periappendiceal fat   stranding has largely resolved.    PERITONEUM: No ascites.    VESSELS: Aortic calcifications. Hypodense lesion noted at the origin of   the SMA which was previously seen but not as well appreciated on prior   noncontrast enhanced study on 12/30/2021. SMA however appears patent.    RETROPERITONEUM/LYMPH NODES: No lymphadenopathy.    ABDOMINAL WALL: . Fat-containing umbilical hernia.    BONES: Remote left proximal humeral fracture status post ORIF. Unchanged   T11 and L1 superior endplate compression deformities.    IMPRESSION:  Limited exam for pulmonary embolism; no central pulmonary embolism with   nondiagnostic evaluation of lobar, segmental and subsegmental branches.   No imaging evidence of right heart strain.    Complete lingular and left lower lobe collapse withsuperimposed   bronchial impaction. Small left pleural effusion. Mild patchy right   apical groundglass may be related to pulmonary edema, infection or   inflammation.    Multilocular 4.0 x 2.2 cm right lower quadrant fluid collection in the   regionof previously perforated appendix, status post drain removal.   Periappendiceal fat stranding has largely resolved.    Left PICC crosses midline and terminates in the right innominate vein,   likely repositioned during the administration of contrast between    imaging and CTA acquisition.    Cholelithiasis and choledocholithiasis. No intrahepatic biliary ductal   dilatation.    MARKO GUAMAN DO; Resident Radiologist  This document has been electronically signed.  SHARATH TURCIOS M.D., Attending Radiologist  This document has been electronically signed. Jan 20 2022 12:48PM  ---------------------------------------------------------------------------------------------------------------  < from: CT Head No Cont (01.20.22 @ 10:35) >    EXAM:  CT BRAIN                          PROCEDURE DATE:  01/20/2022      FINDINGS:    Study is limited by motion.    No hydrocephalus, midline shift, or effacement of the basal cisterns. No   gross evidence for acute intracranial hemorrhage or brain edema.    Complete opacification of the right maxillary sinus and right mastoid air   cells.    IMPRESSION:    Limited by motion.    No gross evidence for acute intracranial hemorrhage or brain edema.    No hydrocephalus or midline shift.    Complete opacification of the right maxillary sinus and right mastoid air   cells. Correlate for the presence of sinusitis/mastoiditis.    LESLI KING MD; Attending Radiologist  This document has been electronically signed. Jan 20 2022 10:38AM  ---------------------------------------------------------------------------------------------------------------

## 2022-01-24 NOTE — PROGRESS NOTE ADULT - ASSESSMENT
86   year old female    h/o hemorrhagic CVA, has  PEG,  HTN, MI,   HLD, gout, right ca  breast   right Ca breast. s/p mastectomy in 2019,  s/p  sentinel node  localization.  4/4,  were  negative  peg dislodged.  IR   replacements  on 1/3/22   picc  and iv ab  for 6 weeks, per  ID, on  last  visit  s/p rrt   for hypoxia. cxr with complete  collapsed  of  left lung, needing broch, by  pulm [facility]   s/ p  bronchoscopy , pt  with  tracheomalacia  and  collapsed  airways,  makes  this a  difficult  situation, as there is no good rx for this          *  p/w   sob. acute  resp failure  on Bipap,  from left   lung  collapse  also  on ct, with  ?  infection  afberile,  with normal  wbc  on arrival  *   s/p cva, has  PEG,  npo  ,  on  synthroid, Keppra  ,  *  HTN, on meds  per  card  *  h/o ca breast. /, s/p  R  mastectomy  * obesity, bmi  was   41, now  is  34 in  er  *   h/o bacteremia. on 12/19/21,  Staph epi, meth R,  from perforated  appendicitis  ct  c/a/p, from last  visit   pna, perforated  appendicitis,  ?  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 this will not alter rx. pt  will need   extended  course  of ab     on iv  vanco and ertapenem.  till  2/9/.22  ID dr reagan, and per  surg  and  IR  eval,  no intervnetion  CT  1/20/22, no PE. collection in right side  , on Proventil,  Mucomyst and   saline  nebs.  pt  with  h/o  tracheomalacia  and  collapsed  airways,  makes  this a  difficult  situation, as there is no good rx for this  given pt;s  mlple co morbidities,  pt is  at risk for  re admissions  picc  line/  on nocturnal  awaps  on hi flow, remains hypoxic/  pulm  to  f/p       per  son, pt is  full code     rad< from: CT Angio Chest PE Protocol w/ IV Cont (01.20.22 @ 10:35) >  IMPRESSION:  Limited exam for pulmonary embolism; no central pulmonary embolism with   nondiagnostic evaluation of lobar, segmental and subsegmental branches.   No imaging evidence of right heart strain.  Complete lingular and left lower lobe collapse withsuperimposed   bronchial impaction. Small left pleural effusion. Mild patchy right   apical groundglass may be related to pulmonary edema, infection or   inflammation.  Multilocular 4.0 x 2.2 cm right lower quadrant fluid collection in the   regionof previously perforated appendix, status post drain removal.   Periappendiceal fat stranding has largely resolved.  Left PICC crosses midline and terminates in the right innominate vein,   likely repositioned during the administration of contrast between    imaging and CTA acquisition.  Cholelithiasis and choledocholithiasis. No intrahepatic biliary ductal   dilatation.  --- End of Report ---  < end of copied text >

## 2022-01-24 NOTE — PROGRESS NOTE ADULT - ASSESSMENT
ASSESSMENT:    86 year old gentlewoman, lifelong non-smoker, without history of intrinsic lung disease. 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 She also has a history of HTN, HLD, CAD s/p MI with preserved LVEF and moderate aortic stenosis. The patient was admitted to [facility] on December 19th 2021 with fever and abdominal pain. She was found to have perforated appendicitis with abscess formation. She was treated with tube drainage and antibiotics for a polymicrobial infection. She continues on vancomycin/ertapenem via a PICC line. Admission CT had debris within the left lobar and more distal airways of the left lower lobe as well as some debris within the right lower lobe airway - there was complete atelectasis of the left lower lobe and subsegmental atelectatic changes within the left upper lobe and dependent portions of the right lung. The patient developed increased work of breathing, tachycardia, tachypnea and hypoxemia on January 4th due to mucous plugging with complete collapse of the left lung. She underwent bronchoscopy on January 6th with removal of copious amounts of purulent secretions from throughout the bronchial tree - there was severe tracheobronchomalacia. Bronchial cultures were negative. She was treated with bronchodilators, mucolytic nebs, chest vest and nocturnal AVAPS with expansion of the left upper lobe and some reexpansion of the left lower lobe. She was discharged to rehab on January 11th on a 3lpm nasal canula with plans to continue medical and mechanical therapy as well as nocturnal BIPAP. The patient returns from rehab with decreased mental status, dyspnea and hypoxemia treated with 100% NRB. She currently is obtunded on a BIPAP device. CT scan -> partial left upper lobe and complete lingular and left lower lobe collapse with superimposed bronchial impaction - small left pleural effusion with intrafissural extension - patchy groundglass opacities at the right apex. The patient has redeveloped left lung atelectasis due to mucous plugging -> multifactorial. There is an area of inflammation/infection in the right upper lobe  1) tracheobronchomalacia with marked expiratory airway narrowing preventing adequate sputum clearance  2) weak cough following a CVA with marked deconditioning   3) dysphagia with ongoing aspiration of oral secretions    PLAN/RECOMMENDATIONS:    oxygen supplementation using a high flow nasal canula   bronchoscopy cannot be performed due to the patient's tenuous respiratory status - she would unlikely not be able to be "liberated" from a ventilator if intubated  nocturnal AVAPS to facilitate lung expansion  follow-up CT - hoping to avoid another bronchoscopy which will unlikely result in long term improvement  s/p bronchoscopy 1/6 with removal of copious amounts of purulent mucous from throughout the airways - there was severe tracheobronchomalacia -> cultures are negative  vancomycin/ertapenem  albuterol/atrovent nebs q6h  hypertonic saline and mucomyst nebs to facilitate mucous clearance  guaifenesin via PEG  chest vest/manual chest PT  cough assist device  cardiac meds: lopressor/norvasc/lipitor  DVT prophylaxis - SQ heparin  allopurinol/keppra/synthroid  glucose control  abdominal/pelvic CT - percutaneous gastrostomy tube present with intraluminal balloon - colonic diverticulosis without diverticulitis - interval removal of the right lower quadrant pigtail catheter in the region of the appendix - there is a residual, multilocular 4.0 x 2.2 cm collection with peripheral enhancement in the region of the previously perforated appendix - periappendiceal fat stranding has largely resolved - ID/IR/surgery evaluations noted - no intervention is needed    I am not hopeful that the left lung will expand due to the multiple issues delineated above - suspect that she will need to be treated with oxygen supplementation to keep saturation greater than 90% accepting the fact that her left lung will be basically non-functional    Will follow with you. Plan of care discussed with the patient at bedside, with the dedicated floor NP and with her son Luis by phone    Kennedy Marcano MD, [facility]  [phone]  Pulmonary Medicine

## 2022-01-25 NOTE — PROGRESS NOTE ADULT - SUBJECTIVE AND OBJECTIVE BOX
[facility] - PROGRESS NOTE    CHIEF COMPLAINT: acute hypoxic respiratory failure; mucous plugging; left lung atelectasis; weak cough; dysphagia; tracheobronchomalacia; PEG;     INTERVAL HISTORY: awake and alert - little insight into medical condition; no shortness of breath or hypoxemia on a 70% high flow nasal canula; wore AVAPS overnight; no cough, sputum production, hemoptysis, chest congestion or wheeze; no fevers, chills or sweats; no chest pain/pressure or palpitations;     REVIEW OF SYSTEMS:  Constitutional: As per interval history  HEENT: Within normal limits  CV: As per interval history  Resp: As per interval history  GI: dysphagia -> PEG  : Within normal limits  Musculoskeletal: Within normal limits  Skin: Within normal limits  Neurological: CVA - > left sided weakness/plegia  Psychiatric: Within normal limits  Endocrine: Within normal limits  Hematologic/Lymphatic: Within normal limits  Allergic/Immunologic: Within normal limits    MEDICATIONS:     Pulmonary "  acetylcysteine 20%  Inhalation 4 milliLiter(s) Inhalation every 6 hours  albuterol/ipratropium for Nebulization 3 milliLiter(s) Nebulizer every 6 hours  guaiFENesin Oral Liquid (Sugar-Free) 300 milliGRAM(s) Oral every 6 hours  sodium chloride 3%  Inhalation 4 milliLiter(s) Inhalation every 6 hours    Anti-microbials:  ertapenem  IVPB 1000 milliGRAM(s) IV Intermittent every 24 hours  vancomycin  IVPB 1000 milliGRAM(s) IV Intermittent every 24 hours    Cardiovascular:  amLODIPine   Tablet 10 milliGRAM(s) Oral daily  metoprolol tartrate 25 milliGRAM(s) Oral two times a day    Other:  allopurinol 100 milliGRAM(s) Oral daily  ascorbic acid 500 milliGRAM(s) Oral daily  atorvastatin 20 milliGRAM(s) Oral at bedtime  chlorhexidine 2% Cloths 1 Application(s) Topical <User Schedule>  heparin SubCutaneous Injection - Peds 5000 Unit(s) SubCutaneous every 8 hours  levETIRAcetam  Solution 750 milliGRAM(s) Oral two times a day  levothyroxine 75 MICROGram(s) Oral daily  multivitamin 1 Tablet(s) Oral daily    MEDICATIONS  (PRN):  acetaminophen    Suspension .. 650 milliGRAM(s) Oral every 6 hours PRN Temp greater or equal to 38C (100.4F), Mild Pain (1 - 3)    OBJECTIVE:    I&O's Detail    24 Jan 2022 07:01  -  25 Jan 2022 07:00  --------------------------------------------------------  IN:    Enteral Tube Flush: 120 mL    Jevity 1.2: 250 mL  Total IN: 370 mL    OUT:    Voided (mL): 800 mL  Total OUT: 800 mL    Total NET: -430 mL    PHYSICAL EXAM:       ICU Vital Signs Last 24 Hrs  T(C): 36.4 (25 Jan 2022 07:44), Max: 36.7 (25 Jan 2022 04:49)  T(F): 97.6 (25 Jan 2022 07:44), Max: 98.1 (25 Jan 2022 04:49)  HR: 93 (25 Jan 2022 07:44) (77 - 98)  BP: 126/76 (25 Jan 2022 07:44) (101/47 - 130/60)  BP(mean): --  ABP: --  ABP(mean): --  RR: 20 (25 Jan 2022 07:44) (18 - 25)  SpO2: 94% (25 Jan 2022 07:44) (92% - 96%) on 70% high flow nasal canula     General: Awake. Alert. Cooperative. No distress Appears stated age.	  HEENT: Atraumatic. Normocephalic. Anicteric. Normal oral mucosa. PERRL. EOMI. High flow nasal canula. Mallampati class IV airway.  Neck: Supple. Trachea midline. Thyroid without enlargement/tenderness/nodules. No carotid bruit. No JVD. Short and wide  Cardiovascular: Regular rate and rhythm. S1 S2 normal. III/VI systolic murmur  Respiratory: Respirations unlabored. Improved breath sounds left hemithorax. No curvature.  Abdomen: Soft. Non-tender. Non-distended. No organomegaly. No masses. Normal bowel sounds. Obese, PEG.  Extremities: Warm to touch. No clubbing or cyanosis. No pedal edema. LUE PICC line  Pulses: 2+ peripheral pulses all extremities.	  Skin: Normal skin color. No rashes or lesions. No ecchymoses. No cyanosis. Warm to touch.  Lymph Nodes: Cervical, supraclavicular and axillary nodes normal  Neurological: Left lower extremity plegia with contraction. Left upper extremity is quite weak. A and O x 3  Psychiatry: Calm    LABS:                          10.6   5.70  )-----------( x        ( 25 Jan 2022 07:06 )             35.5     CBC    WBC  5.70 <==, 6.33 <==, 9.83 <==    Hemoglobin  10.6 <<==, 9.4 <<==, 9.6 <<==    Hematocrit  35.5 <==, 31.2 <==, 32.3 <==    Platelets  191 <==, 183 <==      144  |  106  |  22  ----------------------------<  126<H>    01-22  4.1   |  24  |  0.64      LYTES    sodium  144 <==, 141 <==    potassium   4.1 <==, 4.9 <==    chloride  106 <==, 103 <==    carbon dioxide  24 <==, 24 <==    =============================================================================================  RENAL FUNCTION:    Creatinine:   0.64  <<==, 0.62  <<==    BUN:   22 <==, 32 <==    ============================================================================================    calcium   9.7 <==, 9.9 <==  ============================================================================================  LFTs    AST:   29 <==     ALT:  16  <==     AP:  105  <=    Bili:  0.3  <=    PT/INR - ( 20 Jan 2022 08:53 )   PT: 12.0 sec;   INR: 1.00 ratio      PTT - ( 20 Jan 2022 08:53 )  PTT:31.0 sec    Venous Blood Gas:  01-20 @ 08:53  7.34/55/52/30/83.0  VBG Lactate: 1.7    Serum Pro-Brain Natriuretic Peptide: 148 pg/mL (01-20 @ 08:53)    CARDIAC MARKERS ( 20 Jan 2022 08:53 )  CPK x     /CKMB x     /CKMB Units x        troponin 21 ng/L    < from: Transthoracic Echocardiogram (12.21.21 @ 14:39) >    Patient name: FRANKI MEHTA  YOB: 1935   Age: 86 (F)   MR#: [id number]  Study Date: 12/21/2021  Location: [address] [place], [place] [id number]: [facility]  Study quality: Technically difficult/Limited  Referring Physician: Edmond Almazan MD  Blood Pressure: 134/77 mmHg  Height: 163 cm  Weight: 109 kg  BSA: 2.1 m2  Heart Rate: 105 mmHg  ------------------------------------------------------------------------  PROCEDURE: Transthoracic echocardiogram with 2-D, M-Mode  and complete spectral andcolor flow Doppler.  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 No segmental  wall motion abnormalities.  Moderate aortic stenosis.  There may be a vegetation on the left or non-coronary cusp  of the aortic valve. Consider transesophageal  echocardiography.  ------------------------------------------------------------------------  Confirmed on  12/22/2021 - 10:32:40 by Rahul Correa M.D.  ------------------------------------------------------------------------    MICROBIOLOGY:     Respiratory Viral Panel with COVID-19 by RYAN (01.20.22 @ 08:52)   Rapid RVP Result: [facility]   SARS-CoV-2: NotDete    Urinalysis Basic - ( 20 Jan 2022 14:56 )    Color: Yellow / Appearance: Slightly Turbid / SG: >1.050 / pH: x  Gluc: x / Ketone: Negative  / Bili: Negative / Urobili: Negative   Blood: x / Protein: 100 / Nitrite: Negative   Leuk Esterase: Large / RBC: 7 /hpf / WBC 25 /HPF   Sq Epi: x / Non Sq Epi: 5 /hpf / Bacteria: Few    Culture - Bronchial (01.06.22 @ 18:48)   Culture Results:   Normal Respiratory Regina present     Culture - Abscess with Gram Stain (12.23.21 @ 18:49)   Specimen Source: .Abscess abdominal abscess   Culture Results:   Few Escherichia coli   Few Morganella morganii   Moderate Enterococcus avium   Numerous Bacteroides thetaiotamcron group "Susceptibilities not performed"   Numerous Clostridium ramosum "Susceptibilities not performed"     Culture - Blood (12.19.21 @ 14:24)   Culture Results:   Growth in aerobic bottle: Staphylococcus epidermidis   Organism Identification: Staphylococcus epidermidis     Culture - Blood (12.19.21 @ 14:24)   Gram Stain:   Growth in aerobic bottle: Gram Positive Cocci in Clusters   Growth in anaerobic bottle: Gram Positive Cocci in Clusters   - Staphylococcus epidermidis, Methicillin resistant: Detec     RADIOLOGY:  [x] Chest radiographs reviewed and interpreted by me    CXR 1/23 "to my eye" -> partial reexpansion of the left upper lobe  ---------------------------------------------------------------------------------------------------------------  < from: Xray Chest 1 View- PORTABLE-Urgent (Xray Chest 1 View- PORTABLE-Urgent .) (01.23.22 @ 16:55) >    EXAM:  XR CHEST PORTABLE URGENT 1V                          PROCEDURE DATE:  01/23/2022      INTERPRETATION:  EXAMINATION: XR CHEST URGENT    CLINICAL INDICATION: PICC placement    TECHNIQUE: Single frontal, portable view of the chest was obtained.    COMPARISON: Chest radiograph 1/21/2022.    FINDINGS:  Left-sided PICC line tip terminates at the left brachiocephalic/SVC   junction.  The heart is enlarged.  Hazy opacification of the left lower lung field which may represent   atelectasis or pleural effusion.. Is not significant change from the   prior study.  No pneumothorax.    IMPRESSION:  Left-sided PICC line tip terminates at the left brachiocephalic/SVC   junction.    MOUSTAPHA COURTNEY MD; Resident Radiology  This document has been electronically signed.  DIANE CLARK MD; Attending Radiologist  This document has been electronically signed. Jan 24 2022  5:24PM  ---------------------------------------------------------------------------------------------------------------  < from: CT Abdomen and Pelvis w/ IV Cont (01.20.22 @ 10:35) >    EXAM:  CT ABDOMEN AND PELVIS IC                        [facility]                          PROCEDURE DATE:  01/20/2022      FINDINGS:    CHEST:    PULMONARY ANGIOGRAM: Limited study secondary to extensive respiratory   motion artifact. No evidence of large central pulmonary embolism, with   limited evaluation of lobar, segmental and subsegmental branches. The   pulmonary artery is enlarged measuring 3.3 cm, nonspecific although can   be seen with pulmonary hypertension.    MEDIASTINUM/LYMPH NODES: No mediastinal or hilar lymphadenopathy. Small   hiatal hernia.    AIRWAYS/LUNGS/PLEURA: Degraded by respiratory motion artifact. There is   partial left upper lobe and complete lingular and left lower lobe   collapse with superimposed bronchial impaction. There is a small left   pleural effusion with intrafissural extension. There are patchy   groundglass opacities at the right apex, likely infectious/inflammatory.   No pneumothorax. Mild subpleural reticulation along the right middle lobe   may be secondary to right breast/chest wall radiotherapy.    HEART/VASCULATURE: Heart size is normal. No pericardial effusion. Mild   aortic valvular and mitral annular calcification. A left PICC is present   which has repositioned between the acquisition of the  imaging   (terminating in the SVC on ) and the chest CTA, now crossing midline   into the right innominate vein.    CHEST WALL AND LOWER NECK: The thyroid is incompletely characterized on   this study. Status post right mastectomy.    ABDOMEN AND PELVIS:    LIVER: Within normal limits. Calcified hepatic granuloma.    BILE DUCTS: No intrahepatic biliary ductal dilatation. Redemonstration of   choledocholithiasis in the ampullary region, unchanged since 12/30/2021.    GALLBLADDER: Cholelithiasis.    SPLEEN: Within normal limits.    PANCREAS: 1 cm pancreatic tail cyst.    ADRENALS: Within normal limits.    KIDNEYS/URETERS: Mildly atrophic left kidney. No hydronephrosis.    BLADDER: Within normal limits.    REPRODUCTIVE ORGANS: Uterus present. There is a 1.4 cm right adnexal cyst.    BOWEL: Percutaneous gastrostomy tube present with intraluminal balloon.   No bowel obstruction. Colonic diverticulosis without diverticulitis.   Interval removal of the right lower quadrant pigtail catheter in the   region of the appendix. There is a residual, multilocular 4.0 x 2.2 cm   collection with peripheral enhancement (series 3, image 84) in the region   of previously perforated appendix. Previously seen periappendiceal fat   stranding has largely resolved.    PERITONEUM: No ascites.    VESSELS: Aortic calcifications. Hypodense lesion noted at the origin of   the SMA which was previously seen but not as well appreciated on prior   noncontrast enhanced study on 12/30/2021. SMA however appears patent.    RETROPERITONEUM/LYMPH NODES: No lymphadenopathy.    ABDOMINAL WALL: . Fat-containing umbilical hernia.    BONES: Remote left proximal humeral fracture status post ORIF. Unchanged   T11 and L1 superior endplate compression deformities.    IMPRESSION:  Limited exam for pulmonary embolism; no central pulmonary embolism with   nondiagnostic evaluation of lobar, segmental and subsegmental branches.   No imaging evidence of right heart strain.    Complete lingular and left lower lobe collapse withsuperimposed   bronchial impaction. Small left pleural effusion. Mild patchy right   apical groundglass may be related to pulmonary edema, infection or   inflammation.    Multilocular 4.0 x 2.2 cm right lower quadrant fluid collection in the   regionof previously perforated appendix, status post drain removal.   Periappendiceal fat stranding has largely resolved.    Left PICC crosses midline and terminates in the right innominate vein,   likely repositioned during the administration of contrast between    imaging and CTA acquisition.    Cholelithiasis and choledocholithiasis. No intrahepatic biliary ductal   dilatation.    MARKO GUAMAN DO; Resident Radiologist  This document has been electronically signed.  SHARATH TURCIOS M.D., Attending Radiologist  This document has been electronically signed. Jan 20 2022 12:48PM  ---------------------------------------------------------------------------------------------------------------  < from: CT Head No Cont (01.20.22 @ 10:35) >    EXAM:  CT BRAIN                          PROCEDURE DATE:  01/20/2022      FINDINGS:    Study is limited by motion.    No hydrocephalus, midline shift, or effacement of the basal cisterns. No   gross evidence for acute intracranial hemorrhage or brain edema.    Complete opacification of the right maxillary sinus and right mastoid air   cells.    IMPRESSION:    Limited by motion.    No gross evidence for acute intracranial hemorrhage or brain edema.    No hydrocephalus or midline shift.    Complete opacification of the right maxillary sinus and right mastoid air   cells. Correlate for the presence of sinusitis/mastoiditis.    LESLI KING MD; Attending Radiologist  This document has been electronically signed. Jan 20 2022 10:38AM  ---------------------------------------------------------------------------------------------------------------

## 2022-01-25 NOTE — CHART NOTE - NSCHARTNOTEFT_GEN_A_CORE
patient was brought into the angiography suite for planned PICC exchange. patient was noted to be very short of breath despite non rebreather. case cancelled as patient is not stable enough for the planned procedure. can consider bedside exchange by the PICC team.    Jose Meeks MD  PGY-IV, Interventional Radiology  [facility]-[phone], [phone]  [facility]-p.[phone] ([phone]), [phone] patient was brought into the angiography suite for planned PICC exchange. patient was noted to be very short of breath despite non rebreather with SaO2 in mid high 80's. Case cancelled as patient may not be stable enough for the planned procedure. can consider bedside exchange by the PICC team.    Jose Meeks MD  PGY-IV, Interventional Radiology  [facility]-p.[phone], [phone]  [facility]-p.[phone] ([phone]), [phone]

## 2022-01-25 NOTE — PROGRESS NOTE ADULT - ASSESSMENT
ASSESSMENT:    86 year old gentlewoman, lifelong non-smoker, without history of intrinsic lung disease. 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 She also has a history of HTN, HLD, CAD s/p MI with preserved LVEF and moderate aortic stenosis. The patient was admitted to [facility] on December 19th 2021 with fever and abdominal pain. She was found to have perforated appendicitis with abscess formation. She was treated with tube drainage and antibiotics for a polymicrobial infection. She continues on vancomycin/ertapenem via a PICC line. Admission CT had debris within the left lobar and more distal airways of the left lower lobe as well as some debris within the right lower lobe airway - there was complete atelectasis of the left lower lobe and subsegmental atelectatic changes within the left upper lobe and dependent portions of the right lung. The patient developed increased work of breathing, tachycardia, tachypnea and hypoxemia on January 4th due to mucous plugging with complete collapse of the left lung. She underwent bronchoscopy on January 6th with removal of copious amounts of purulent secretions from throughout the bronchial tree - there was severe tracheobronchomalacia. Bronchial cultures were negative. She was treated with bronchodilators, mucolytic nebs, chest vest and nocturnal AVAPS with expansion of the left upper lobe and some reexpansion of the left lower lobe. She was discharged to rehab on January 11th on a 3lpm nasal canula with plans to continue medical and mechanical therapy as well as nocturnal BIPAP. The patient returns from rehab with decreased mental status, dyspnea and hypoxemia treated with 100% NRB. She currently is obtunded on a BIPAP device. CT scan -> partial left upper lobe and complete lingular and left lower lobe collapse with superimposed bronchial impaction - small left pleural effusion with intrafissural extension - patchy groundglass opacities at the right apex. The patient has redeveloped left lung atelectasis due to mucous plugging -> multifactorial. There is an area of inflammation/infection in the right upper lobe  1) tracheobronchomalacia with marked expiratory airway narrowing preventing adequate sputum clearance  2) weak cough following a CVA with marked deconditioning   3) dysphagia with ongoing aspiration of oral secretions  4) moderate aortic stenosis with a small left pleural effusion    PLAN/RECOMMENDATIONS:    oxygen supplementation using a high flow nasal canula -> decrease to 60% FiO2 -> will continue to taper as tolerated  following CXR  bronchoscopy cannot be performed due to the patient's tenuous respiratory status - she would unlikely be able to be "liberated" from a ventilator if intubated  nocturnal AVAPS to facilitate lung expansion  s/p bronchoscopy 1/6 with removal of copious amounts of purulent mucous from throughout the airways - there was severe tracheobronchomalacia -> cultures are negative  vancomycin/ertapenem  albuterol/atrovent nebs q6h  hypertonic saline and mucomyst nebs to facilitate mucous clearance  guaifenesin via PEG  chest vest/manual chest PT  cough assist device  cardiac meds: lopressor/norvasc/lipitor  DVT prophylaxis - SQ heparin  allopurinol/keppra/synthroid  glucose control  abdominal/pelvic CT - percutaneous gastrostomy tube present with intraluminal balloon - colonic diverticulosis without diverticulitis - interval removal of the right lower quadrant pigtail catheter in the region of the appendix - there is a residual, multilocular 4.0 x 2.2 cm collection with peripheral enhancement in the region of the previously perforated appendix - periappendiceal fat stranding has largely resolved - ID/IR/surgery evaluations noted - no intervention is needed    I am not hopeful that the left lung will expand due to the multiple issues delineated above - suspect that she will need to be treated with oxygen supplementation to keep saturation greater than 90% accepting the fact that her left lung will be basically non-functional    Will follow with you. Plan of care discussed with the patient at bedside, with the dedicated floor NP, with Dr. Segundo and with her son Luis by phone    Kennedy Marcano MD, [facility]  [phone]  Pulmonary Medicine

## 2022-01-25 NOTE — PROGRESS NOTE ADULT - SUBJECTIVE AND OBJECTIVE BOX
CARDIOLOGY     PROGRESS  NOTE   ________________________________________________    CHIEF COMPLAINT:Patient is a 86y old  Female who presents with a chief complaint of sob (24 Jan 2022 08:28)  no complain,  decrease o2 sat intermittently.  	  REVIEW OF SYSTEMS:  CONSTITUTIONAL: No fever, weight loss, or fatigue  EYES: No eye pain, visual disturbances, or discharge  ENT:  No difficulty hearing, tinnitus, vertigo; No sinus or throat pain  NECK: No pain or stiffness  RESPIRATORY: No cough, wheezing, chills or hemoptysis; No Shortness of Breath  CARDIOVASCULAR: No chest pain, palpitations, passing out, dizziness, or leg swelling  GASTROINTESTINAL: No abdominal or epigastric pain. No nausea, vomiting, or hematemesis; No diarrhea or constipation. No melena or hematochezia.  GENITOURINARY: No dysuria, frequency, hematuria, or incontinence  NEUROLOGICAL: No headaches, memory loss, loss of strength, numbness, or tremors  SKIN: No itching, burning, rashes, or lesions   LYMPH Nodes: No enlarged glands  ENDOCRINE: No heat or cold intolerance; No hair loss  MUSCULOSKELETAL: No joint pain or swelling; No muscle, back, or extremity pain  PSYCHIATRIC: No depression, anxiety, mood swings, or difficulty sleeping  HEME/LYMPH: No easy bruising, or bleeding gums  ALLERGY AND IMMUNOLOGIC: No hives or eczema	    [ ] All others negative	  [x ] Unable to obtain    PHYSICAL EXAM:  T(C): 36.4 (01-25-22 @ 07:44), Max: 36.7 (01-25-22 @ 04:49)  HR: 93 (01-25-22 @ 07:44) (77 - 98)  BP: 126/76 (01-25-22 @ 07:44) (101/47 - 130/60)  RR: 20 (01-25-22 @ 07:44) (18 - 25)  SpO2: 94% (01-25-22 @ 07:44) (92% - 96%)  Wt(kg): --  I&O's Summary    24 Jan 2022 07:01  -  25 Jan 2022 07:00  --------------------------------------------------------  IN: 370 mL / OUT: 800 mL / NET: -430 mL        Appearance: Normal	  HEENT:   Normal oral mucosa, PERRL, EOMI	  Lymphatic: No lymphadenopathy  Cardiovascular: Normal S1 S2, No JVD, + murmurs, No edema  Respiratory: Lungs clear to auscultation	  Gastrointestinal:  Soft, Non-tender, + BS	  Skin: No rashes, No ecchymoses, No cyanosis	  Neurologic: Non-focal  Extremities: Normal range of motion, No clubbing, cyanosis or edema  Vascular: Peripheral pulses palpable 2+ bilaterally    MEDICATIONS  (STANDING):  acetylcysteine 20%  Inhalation 4 milliLiter(s) Inhalation every 6 hours  albuterol/ipratropium for Nebulization 3 milliLiter(s) Nebulizer every 6 hours  allopurinol 100 milliGRAM(s) Oral daily  amLODIPine   Tablet 10 milliGRAM(s) Oral daily  ascorbic acid 500 milliGRAM(s) Oral daily  atorvastatin 20 milliGRAM(s) Oral at bedtime  chlorhexidine 2% Cloths 1 Application(s) Topical <User Schedule>  ertapenem  IVPB 1000 milliGRAM(s) IV Intermittent every 24 hours  guaiFENesin Oral Liquid (Sugar-Free) 300 milliGRAM(s) Oral every 6 hours  heparin SubCutaneous Injection - Peds 5000 Unit(s) SubCutaneous every 8 hours  levETIRAcetam  Solution 750 milliGRAM(s) Oral two times a day  levothyroxine 75 MICROGram(s) Oral daily  metoprolol tartrate 25 milliGRAM(s) Oral two times a day  multivitamin 1 Tablet(s) Oral daily  sodium chloride 3%  Inhalation 4 milliLiter(s) Inhalation every 6 hours  vancomycin  IVPB 1000 milliGRAM(s) IV Intermittent every 24 hours      TELEMETRY: 	    ECG:  	  RADIOLOGY:  OTHER: 	  	  LABS:	 	    CARDIAC MARKERS:                                10.6   5.70  )-----------( x        ( 25 Jan 2022 07:06 )             35.5           proBNP: Serum Pro-Brain Natriuretic Peptide: 148 pg/mL (01-20 @ 08:53)    Lipid Profile:   HgA1c:   TSH: Thyroid Stimulating Hormone, Serum: 2.39 uIU/mL (01-07 @ 09:13)    abdominal/pelvic CT - percutaneous gastrostomy tube present with intraluminal balloon - colonic diverticulosis without diverticulitis - interval removal of the right lower quadrant pigtail catheter in the region of the appendix - there is a residual, multilocular 4.0 x 2.2 cm collection with peripheral enhancement in the region of the previously perforated appendix - periappendiceal fat stranding has largely resolved - ID/IR/surgery evaluations noted - no intervention is needed      Assessment and plan  ---------------------------  85 y/o female, hx of CVA, MI, breast CA, COPD, not on home 02, s/p  recent  perforated  appendix / abscess,  HN, HLD, hypothyroid,  obesity relatively  bed bound , gout   BIBEMS from [facility] due to SOB., started this morning   patient initial 02 sat was 84% placed on NRB and improved to 93%.  has a pic line to left arm ,  and  peg tube  pt had a long course of admission sec to mucus plug and collapsed lung, s/p bronchoscopy.  ct angio of the chest noted, sig abnormality with collapse of the lung  small pleural effusion, doubt chf  agree with iv abx  dvt prophylaxis  pulmonary noted  ecg noted with old inferior wall mi, no acute changes  tele noted, nsr, no sig arrythmia  continue current meds  continue current bp meds  echo noted  will increase beta blocker as tolerated  remained in NSR, sob sec to underlying lung disease  dvt prophylaxis  continue abx, fu blood cultures  pulmonary noted

## 2022-01-25 NOTE — PROGRESS NOTE ADULT - SUBJECTIVE AND OBJECTIVE BOX
afberile    REVIEW OF SYSTEMS:  GEN: no fever,    no chills  RESP: no SOB,   no cough  CVS: no chest pain,   no palpitations  GI: no abdominal pain,   no nausea,   no vomiting,   no constipation,   no diarrhea  : no dysuria,   no frequency  NEURO: no headache,   no dizziness  PSYCH: no depression,   not anxious  Derm : no rash    MEDICATIONS  (STANDING):  acetylcysteine 20%  Inhalation 4 milliLiter(s) Inhalation every 6 hours  albuterol/ipratropium for Nebulization 3 milliLiter(s) Nebulizer every 6 hours  allopurinol 100 milliGRAM(s) Oral daily  amLODIPine   Tablet 10 milliGRAM(s) Oral daily  ascorbic acid 500 milliGRAM(s) Oral daily  atorvastatin 20 milliGRAM(s) Oral at bedtime  chlorhexidine 2% Cloths 1 Application(s) Topical <User Schedule>  ertapenem  IVPB 1000 milliGRAM(s) IV Intermittent every 24 hours  guaiFENesin Oral Liquid (Sugar-Free) 300 milliGRAM(s) Oral every 6 hours  heparin SubCutaneous Injection - Peds 5000 Unit(s) SubCutaneous every 8 hours  levETIRAcetam  Solution 750 milliGRAM(s) Oral two times a day  levothyroxine 75 MICROGram(s) Oral daily  metoprolol tartrate 25 milliGRAM(s) Oral two times a day  multivitamin 1 Tablet(s) Oral daily  sodium chloride 3%  Inhalation 4 milliLiter(s) Inhalation every 6 hours  vancomycin  IVPB 1000 milliGRAM(s) IV Intermittent every 24 hours    MEDICATIONS  (PRN):  acetaminophen    Suspension .. 650 milliGRAM(s) Oral every 6 hours PRN Temp greater or equal to 38C (100.4F), Mild Pain (1 - 3)      Vital Signs Last 24 Hrs  T(C): 36.4 (25 Jan 2022 07:44), Max: 36.7 (25 Jan 2022 04:49)  T(F): 97.6 (25 Jan 2022 07:44), Max: 98.1 (25 Jan 2022 04:49)  HR: 93 (25 Jan 2022 07:44) (77 - 98)  BP: 126/76 (25 Jan 2022 07:44) (101/47 - 130/60)  BP(mean): --  RR: 20 (25 Jan 2022 07:44) (18 - 25)  SpO2: 94% (25 Jan 2022 07:44) (92% - 96%)  CAPILLARY BLOOD GLUCOSE        I&O's Summary    24 Jan 2022 07:01  -  25 Jan 2022 07:00  --------------------------------------------------------  IN: 370 mL / OUT: 800 mL / NET: -430 mL        PHYSICAL EXAM:  HEAD:  Atraumatic, Normocephalic  NECK: Supple, No   JVD  CHEST/LUNG:   no     rales,     no,    rhonchi  HEART: Regular rate and rhythm;         murmur  ABDOMEN: Soft, Nontender, ;   EXTREMITIES:   no     edema  NEUROLOGY:  alert    LABS:                        10.6   5.70  )-----------( x        ( 25 Jan 2022 07:06 )             35.5                           Thyroid Stimulating Hormone, Serum: 2.39 uIU/mL (01-07 @ 09:13)          Consultant(s) Notes Reviewed:      Care Discussed with Consultants/Other Providers:

## 2022-01-25 NOTE — PROGRESS NOTE ADULT - SUBJECTIVE AND OBJECTIVE BOX
Vascular & Interventional Radiology Pre-Procedure Note    Procedure Name: PICC exchange    HPI: 86y Female with recently placed PICC noted to be in the subclavian vein presents for PICC exchange.    Allergies: Toradol (Urticaria (Mild to Mod); Rash (Mild to Mod))      Medications:  amLODIPine   Tablet: 10 milliGRAM(s) Oral (01-24 @ 17:53)  ertapenem  IVPB: 120 mL/Hr IV Intermittent (01-25 @ 14:04)  heparin SubCutaneous Injection - Peds: 5000 Unit(s) SubCutaneous (01-25 @ 13:42)  metoprolol tartrate: 25 milliGRAM(s) Oral (01-25 @ 08:05)  vancomycin  IVPB: 250 mL/Hr IV Intermittent (01-24 @ 22:39)      Data:  Vital Signs Last 24 Hrs  T(C): 37 (25 Jan 2022 15:44), Max: 37 (25 Jan 2022 15:44)  T(F): 98.6 (25 Jan 2022 15:44), Max: 98.6 (25 Jan 2022 15:44)  HR: 78 (25 Jan 2022 15:44) (78 - 98)  BP: 128/76 (25 Jan 2022 15:44) (105/66 - 128/76)  BP(mean): --  RR: 19 (25 Jan 2022 15:44) (18 - 25)  SpO2: 99% (25 Jan 2022 15:44) (92% - 99%)    LABS:                        See note  Clotted )-----------( Clotted    ( 25 Jan 2022 07:06 )             See note    01-25    138  |  104  |  21  ----------------------------<  138<H>  6.3<HH>   |  17<L>  |  0.58    Ca    9.2      25 Jan 2022 07:02          Imaging: CXR demonstrates the PICC to be slightly short    Plan:   -86y Female presents for PICC exchange  -Risks/Benefits/alternatives explained with the patient's healthcare proxy ([email or web address]) and witnessed informed consent obtained.  Vascular & Interventional Radiology Pre-Procedure Note    Procedure Name: PICC exchange    HPI: 86y Female with recently placed PICC noted to be in the subclavian vein presents for PICC exchange.    Allergies: Toradol (Urticaria (Mild to Mod); Rash (Mild to Mod))      Medications:  amLODIPine   Tablet: 10 milliGRAM(s) Oral (01-24 @ 17:53)  ertapenem  IVPB: 120 mL/Hr IV Intermittent (01-25 @ 14:04)  heparin SubCutaneous Injection - Peds: 5000 Unit(s) SubCutaneous (01-25 @ 13:42)  metoprolol tartrate: 25 milliGRAM(s) Oral (01-25 @ 08:05)  vancomycin  IVPB: 250 mL/Hr IV Intermittent (01-24 @ 22:39)      Data:  Vital Signs Last 24 Hrs  T(C): 37 (25 Jan 2022 15:44), Max: 37 (25 Jan 2022 15:44)  T(F): 98.6 (25 Jan 2022 15:44), Max: 98.6 (25 Jan 2022 15:44)  HR: 78 (25 Jan 2022 15:44) (78 - 98)  BP: 128/76 (25 Jan 2022 15:44) (105/66 - 128/76)  BP(mean): --  RR: 19 (25 Jan 2022 15:44) (18 - 25)  SpO2: 99% (25 Jan 2022 15:44) (92% - 99%)    LABS:                        See note  Clotted )-----------( Clotted    ( 25 Jan 2022 07:06 )             See note    01-25    138  |  104  |  21  ----------------------------<  138<H>  6.3<HH>   |  17<L>  |  0.58    Ca    9.2      25 Jan 2022 07:02          Imaging: CXR demonstrates the PICC to be slightly short    Plan:   -86y Female presents for PICC exchange.  -Risks/Benefits/alternatives explained with the patient's healthcare proxy ([email or web address]) and witnessed informed consent obtained.

## 2022-01-25 NOTE — PROGRESS NOTE ADULT - ASSESSMENT
86   year old female    h/o hemorrhagic CVA, has  PEG,  HTN, MI,   HLD, gout, right ca  breast   right Ca breast. s/p mastectomy in 2019,  s/p  sentinel node  localization.  4/4,  were  negative  peg dislodged.  IR   replacements  on 1/3/22   picc  and iv ab  for 6 weeks, per  ID, on  last  visit  s/p rrt   for hypoxia. cxr with complete  collapsed  of  left lung, needing broch, by  puledson mir   s/ p  bronchoscopy , pt  with  tracheomalacia  and  collapsed  airways,  makes  this a  difficult  situation, as there is no good rx for this          *  p/w   sob. acute  resp failure  on Bipap,  from left   lung  collapse  also  on ct, with  ?  infection  afberile,  with normal  wbc  on arrival  *   s/p cva, has  PEG,  npo  ,  on  synthroid, Keppra  ,  *  HTN, on meds  per  card  *  h/o ca breast. /, s/p  R  mastectomy  * obesity, bmi  was   41, now  is  34 in  er  *   h/o bacteremia. on 12/19/21,  Staph epi, meth R,  from perforated  appendicitis  ct  c/a/p, from last  visit   pna, perforated  appendicitis,  ?  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 this will not alter rx. pt  will need   extended  course  of ab     on iv  vanco and ertapenem.  till  2/9/.22  ID dr reagan, and per  surg  and  IR  eval,  no intervnetion  CT  1/20/22, no PE. collection in right side  , on Proventil,  Mucomyst and   saline  nebs.  pt  with  h/o  tracheomalacia  and  collapsed  airways,  makes  this a  difficult  situation, as there is no good rx for this  given pt;s  mlple co morbidities,  pt is  at risk for  re admissions  picc  line/  on nocturnal  awaps  on hi flow, remains hypoxic  on  oxygen/  supportive  care       per  son, pt is  full code     rad< from: CT Angio Chest PE Protocol w/ IV Cont (01.20.22 @ 10:35) >  IMPRESSION:  Limited exam for pulmonary embolism; no central pulmonary embolism with   nondiagnostic evaluation of lobar, segmental and subsegmental branches.   No imaging evidence of right heart strain.  Complete lingular and left lower lobe collapse withsuperimposed   bronchial impaction. Small left pleural effusion. Mild patchy right   apical groundglass may be related to pulmonary edema, infection or   inflammation.  Multilocular 4.0 x 2.2 cm right lower quadrant fluid collection in the   regionof previously perforated appendix, status post drain removal.   Periappendiceal fat stranding has largely resolved.  Left PICC crosses midline and terminates in the right innominate vein,   likely repositioned during the administration of contrast between    imaging and CTA acquisition.  Cholelithiasis and choledocholithiasis. No intrahepatic biliary ductal   dilatation.  --- End of Report ---  < end of copied text >

## 2022-01-26 NOTE — PROGRESS NOTE ADULT - ASSESSMENT
86   year old female    h/o hemorrhagic CVA, has  PEG,  HTN, MI,   HLD, gout, right ca  breast   right Ca breast. s/p mastectomy in 2019,  s/p  sentinel node  localization.  4/4,  were  negative  peg dislodged.  IR   replacements  on 1/3/22   picc  and iv ab  for 6 weeks, per  ID, on  last  visit  s/p rrt   for hypoxia. cxr with complete  collapsed  of  left lung, needing broch, by  puledson mir   s/ p  bronchoscopy , pt  with  tracheomalacia  and  collapsed  airways,  makes  this a  difficult  situation, as there is no good rx for this          *  p/w   sob. acute  resp failure  on Bipap,  from left   lung  collapse  also  on ct, with  ?  infection  afberile,  with normal  wbc  on arrival  *   s/p cva, has  PEG,  npo  ,  on  synthroid, Keppra  ,  *  HTN, on meds  per  card  *  h/o ca breast. /, s/p  R  mastectomy  * obesity, bmi  was   41, now  is  34 in  er  *   h/o bacteremia. on 12/19/21,  Staph epi, meth R,  from perforated  appendicitis  ct  c/a/p, from last  visit   pna, perforated  appendicitis,  ?  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 this will not alter rx. pt  will need   extended  course  of ab     on iv  vanco and ertapenem.  till  2/9/.22  ID dr reagan, and per  surg  and  IR  eval,  no intervnetion  CT  1/20/22, no PE. collection in right side  , on Proventil,  Mucomyst and   saline  nebs.  pt  with  h/o  tracheomalacia  and  collapsed  airways,  makes  this a  difficult  situation, as there is no good rx for this  given pt;s  mlple co morbidities,  pt is  at risk for  re admissions  picc  line/  on nocturnal  awaps  on hi flow, remains hypoxic  on  hi flow,  supportive  care  perhaps  pt is  an rcu candidate/  on oxygen  bmp pending       per  son, pt is  full code     rad< from: CT Angio Chest PE Protocol w/ IV Cont (01.20.22 @ 10:35) >  IMPRESSION:  Limited exam for pulmonary embolism; no central pulmonary embolism with   nondiagnostic evaluation of lobar, segmental and subsegmental branches.   No imaging evidence of right heart strain.  Complete lingular and left lower lobe collapse withsuperimposed   bronchial impaction. Small left pleural effusion. Mild patchy right   apical groundglass may be related to pulmonary edema, infection or   inflammation.  Multilocular 4.0 x 2.2 cm right lower quadrant fluid collection in the   regionof previously perforated appendix, status post drain removal.   Periappendiceal fat stranding has largely resolved.  Left PICC crosses midline and terminates in the right innominate vein,   likely repositioned during the administration of contrast between    imaging and CTA acquisition.  Cholelithiasis and choledocholithiasis. No intrahepatic biliary ductal   dilatation.  --- End of Report ---  < end of copied text >                    86   year old female    h/o hemorrhagic CVA, has  PEG,  HTN, MI,   HLD, gout, right ca  breast   right Ca breast. s/p mastectomy in 2019,  s/p  sentinel node  localization.  4/4,  were  negative  peg dislodged.  IR   replacements  on 1/3/22   picc  and iv ab  for 6 weeks, per  ID, on  last  visit  s/p rrt   for hypoxia. cxr with complete  collapsed  of  left lung, needing broch, by  puledson mir   s/ p  bronchoscopy , pt  with  tracheomalacia  and  collapsed  airways,  makes  this a  difficult  situation, as there is no good rx for this          *  p/w   sob. acute  resp failure  on Bipap,  from left   lung  collapse  also  on ct, with  ?  infection  afberile,  with normal  wbc  on arrival  *   s/p cva, has  PEG,  npo  ,  on  synthroid, Keppra  ,  *  HTN, on meds  per  card  *  h/o ca breast. /, s/p  R  mastectomy  * obesity, bmi  was   41, now  is  34 in  er  *   h/o bacteremia. on 12/19/21,  Staph epi, meth R,  from perforated  appendicitis  ct  c/a/p, from last  visit   pna, perforated  appendicitis,  ?  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 this will not alter rx. pt  will need   extended  course  of ab     on iv  vanco and ertapenem.  till  2/9/.22  ID dr reagan, and per  surg  and  IR  eval,  no intervnetion  CT  1/20/22, no PE. collection in right side  , on Proventil,  Mucomyst and   saline  nebs.  pt  with  h/o  tracheomalacia  and  collapsed  airways,  makes  this a  difficult  situation, as there is no good rx for this  given pt;s  mlple co morbidities,  pt is  at risk for  re admissions  picc  line/  on nocturnal  awaps  on hi flow, remains hypoxic  on  hi flow,  supportive  care  perhaps  pt is  an rcu candidate/  on oxygen  bmp pending.  IR f/p  for picc  acess       per  son, pt is  full code     rad< from: CT Angio Chest PE Protocol w/ IV Cont (01.20.22 @ 10:35) >  IMPRESSION:  Limited exam for pulmonary embolism; no central pulmonary embolism with   nondiagnostic evaluation of lobar, segmental and subsegmental branches.   No imaging evidence of right heart strain.  Complete lingular and left lower lobe collapse withsuperimposed   bronchial impaction. Small left pleural effusion. Mild patchy right   apical groundglass may be related to pulmonary edema, infection or   inflammation.  Multilocular 4.0 x 2.2 cm right lower quadrant fluid collection in the   regionof previously perforated appendix, status post drain removal.   Periappendiceal fat stranding has largely resolved.  Left PICC crosses midline and terminates in the right innominate vein,   likely repositioned during the administration of contrast between    imaging and CTA acquisition.  Cholelithiasis and choledocholithiasis. No intrahepatic biliary ductal   dilatation.  --- End of Report ---  < end of copied text >

## 2022-01-26 NOTE — PROGRESS NOTE ADULT - SUBJECTIVE AND OBJECTIVE BOX
CARDIOLOGY     PROGRESS  NOTE   ________________________________________________    CHIEF COMPLAINT:Patient is a 86y old  Female who presents with a chief complaint of sob (25 Jan 2022 16:30)  comfortable.  	  REVIEW OF SYSTEMS:  CONSTITUTIONAL: No fever, weight loss, or fatigue  EYES: No eye pain, visual disturbances, or discharge  ENT:  No difficulty hearing, tinnitus, vertigo; No sinus or throat pain  NECK: No pain or stiffness  RESPIRATORY: No cough, wheezing, chills or hemoptysis; No Shortness of Breath  CARDIOVASCULAR: No chest pain, palpitations, passing out, dizziness, or leg swelling  GASTROINTESTINAL: No abdominal or epigastric pain. No nausea, vomiting, or hematemesis; No diarrhea or constipation. No melena or hematochezia.  GENITOURINARY: No dysuria, frequency, hematuria, or incontinence  NEUROLOGICAL: No headaches, memory loss, loss of strength, numbness, or tremors  SKIN: No itching, burning, rashes, or lesions   LYMPH Nodes: No enlarged glands  ENDOCRINE: No heat or cold intolerance; No hair loss  MUSCULOSKELETAL: No joint pain or swelling; No muscle, back, or extremity pain  PSYCHIATRIC: No depression, anxiety, mood swings, or difficulty sleeping  HEME/LYMPH: No easy bruising, or bleeding gums  ALLERGY AND IMMUNOLOGIC: No hives or eczema	    [ ] All others negative	  [x ] Unable to obtain    PHYSICAL EXAM:  T(C): 36.4 (01-25-22 @ 20:06), Max: 37 (01-25-22 @ 15:44)  HR: 82 (01-25-22 @ 20:06) (78 - 93)  BP: 116/62 (01-25-22 @ 20:06) (105/66 - 128/76)  RR: 19 (01-25-22 @ 20:06) (19 - 25)  SpO2: 92% (01-25-22 @ 20:06) (92% - 99%)  Wt(kg): --  I&O's Summary    24 Jan 2022 07:01  -  25 Jan 2022 07:00  --------------------------------------------------------  IN: 370 mL / OUT: 800 mL / NET: -430 mL    25 Jan 2022 07:01  -  26 Jan 2022 03:46  --------------------------------------------------------  IN: 0 mL / OUT: 650 mL / NET: -650 mL        Appearance: Normal	  HEENT:   Normal oral mucosa, PERRL, EOMI	  Lymphatic: No lymphadenopathy  Cardiovascular: Normal S1 S2, No JVD, + murmurs, No edema  Respiratory: rhonchi  Psychiatry: A & O x 3, Mood & affect appropriate  Gastrointestinal:  Soft, Non-tender, + BS	  Skin: No rashes, No ecchymoses, No cyanosis	  Neurologic: Non-focal  Extremities: Normal range of motion, No clubbing, cyanosis or edema  Vascular: Peripheral pulses palpable 2+ bilaterally    MEDICATIONS  (STANDING):  acetylcysteine 20%  Inhalation 4 milliLiter(s) Inhalation every 6 hours  albuterol/ipratropium for Nebulization 3 milliLiter(s) Nebulizer every 6 hours  allopurinol 100 milliGRAM(s) Oral daily  amLODIPine   Tablet 10 milliGRAM(s) Oral daily  artificial  tears Solution 1 Drop(s) Both EYES three times a day  ascorbic acid 500 milliGRAM(s) Oral daily  atorvastatin 20 milliGRAM(s) Oral at bedtime  chlorhexidine 2% Cloths 1 Application(s) Topical <User Schedule>  ertapenem  IVPB 1000 milliGRAM(s) IV Intermittent every 24 hours  guaiFENesin Oral Liquid (Sugar-Free) 300 milliGRAM(s) Oral every 6 hours  heparin SubCutaneous Injection - Peds 5000 Unit(s) SubCutaneous every 8 hours  levETIRAcetam  Solution 750 milliGRAM(s) Oral two times a day  levothyroxine 75 MICROGram(s) Oral daily  metoprolol tartrate 25 milliGRAM(s) Oral two times a day  multivitamin 1 Tablet(s) Oral daily  sodium chloride 3%  Inhalation 4 milliLiter(s) Inhalation every 6 hours  vancomycin  IVPB 1000 milliGRAM(s) IV Intermittent every 24 hours      TELEMETRY: 	    ECG:  	  RADIOLOGY:  OTHER: 	  	  LABS:	 	    CARDIAC MARKERS:                                See note  Clotted )-----------( Clotted    ( 25 Jan 2022 07:06 )             See note    01-25    138  |  104  |  21  ----------------------------<  138<H>  6.3<HH>   |  17<L>  |  0.58    Ca    9.2      25 Jan 2022 07:02      proBNP: Serum Pro-Brain Natriuretic Peptide: 148 pg/mL (01-20 @ 08:53)    Lipid Profile:   HgA1c:   TSH: Thyroid Stimulating Hormone, Serum: 2.39 uIU/mL (01-07 @ 09:13)    Culture - Urine (01.20.22 @ 16:37)    -  Vancomycin: R >16    -  Ampicillin: R >8 Predicts results to ampicillin/sulbactam, amoxacillin-clavulanate and  piperacillin-tazobactam.    -  Ciprofloxacin: R >2    -  Daptomycin: SDD 4 The breakpoint for SDD (sensitive dose dependent)is based on a dosage regimen of 8-12 mg/kg administered every 24 h in adults and is intended for serious infections due to E. faecium. Consultation with an infectious diseases specialist is recommended.    -  Levofloxacin: R >4    -  Linezolid: S 2    -  Nitrofurantoin: R >64 Should not be used to treat pyelonephritis.    -  Penicillin: R >8    -  Rifampin: R >2    -  Tetra/Doxy: S <=1    Specimen Source: Clean Catch Clean Catch (Midstream)    Culture Results:   >100,000 CFU/ml Enterococcus faecium (vancomycin resistant)  <10,000 CFU/ml Normal Urogenital ck present    Organism Identification: Enterococcus faecium (vancomycin resistant)    Organism: Enterococcus faecium (vancomycin resistant)    Method Type: TA[place]NA    Culture - Blood (01.20.22 @ 12:21)    Specimen Source: .Blood Blood-Peripheral    Culture Results:   No Growth Final          Assessment and plan  ---------------------------  85 y/o female, hx of CVA, MI, breast CA, COPD, not on home 02, s/p  recent  perforated  appendix / abscess,  HN, HLD, hypothyroid,  obesity relatively  bed bound , gout   BIBEMS from [facility] due to SOB., started this morning   patient initial 02 sat was 84% placed on NRB and improved to 93%.  has a pic line to left arm ,  and  peg tube  pt had a long course of admission sec to mucus plug and collapsed lung, s/p bronchoscopy.  ct angio of the chest noted, sig abnormality with collapse of the lung  small pleural effusion, doubt chf  agree with iv abx  dvt prophylaxis  pulmonary noted  ecg noted with old inferior wall mi, no acute changes  tele noted, nsr, no sig arrythmia  continue current meds  continue current bp meds  echo noted  will increase beta blocker as tolerated  remained in NSR   sob sec to underlying lung disease  dvt prophylaxis  continue abx, fu blood cultures  pulmonary noted  repeat blood work  ID follow up

## 2022-01-26 NOTE — PROGRESS NOTE ADULT - SUBJECTIVE AND OBJECTIVE BOX
[facility] - PROGRESS NOTE    CHIEF COMPLAINT: acute hypoxic respiratory failure; mucous plugging; left lung atelectasis; weak cough; dysphagia; tracheobronchomalacia; PEG;     INTERVAL HISTORY: awake and alert but somewhat confused - little insight into medical condition; no shortness of breath or hypoxemia on a 50% high flow nasal canula; wore AVAPS overnight; no cough, sputum production, hemoptysis, chest congestion or wheeze; no fevers, chills or sweats; no chest pain/pressure or palpitations; bedbound    REVIEW OF SYSTEMS:  Constitutional: As per interval history  HEENT: Within normal limits  CV: As per interval history  Resp: As per interval history  GI: dysphagia -> PEG  : Within normal limits  Musculoskeletal: Within normal limits  Skin: Within normal limits  Neurological: CVA - > left sided weakness/plegia  Psychiatric: Within normal limits  Endocrine: Within normal limits  Hematologic/Lymphatic: Within normal limits  Allergic/Immunologic: Within normal limits    MEDICATIONS:     Pulmonary "  acetylcysteine 20%  Inhalation 4 milliLiter(s) Inhalation every 6 hours  albuterol/ipratropium for Nebulization 3 milliLiter(s) Nebulizer every 6 hours  sodium chloride 3%  Inhalation 4 milliLiter(s) Inhalation every 6 hours    Anti-microbials:  ertapenem  IVPB 1000 milliGRAM(s) IV Intermittent every 24 hours  vancomycin  IVPB 1000 milliGRAM(s) IV Intermittent every 24 hours    Cardiovascular:  amLODIPine   Tablet 10 milliGRAM(s) Oral daily  metoprolol tartrate 25 milliGRAM(s) Oral two times a day    Other:  allopurinol 100 milliGRAM(s) Oral daily  artificial  tears Solution 1 Drop(s) Both EYES three times a day  atorvastatin 20 milliGRAM(s) Oral at bedtime  chlorhexidine 2% Cloths 1 Application(s) Topical <User Schedule>  heparin SubCutaneous Injection - Peds 5000 Unit(s) SubCutaneous every 8 hours  levETIRAcetam  Solution 750 milliGRAM(s) Oral two times a day  levothyroxine 75 MICROGram(s) Oral daily    MEDICATIONS  (PRN):  acetaminophen    Suspension .. 650 milliGRAM(s) Oral every 6 hours PRN Temp greater or equal to 38C (100.4F), Mild Pain (1 - 3)    OBJECTIVE:    PHYSICAL EXAM:       ICU Vital Signs Last 24 Hrs  T(C): 36.6 (26 Jan 2022 04:23), Max: 37 (25 Jan 2022 15:44)  T(F): 97.8 (26 Jan 2022 04:23), Max: 98.6 (25 Jan 2022 15:44)  HR: 102 (26 Jan 2022 10:30) (71 - 102)  BP: 105/61 (26 Jan 2022 04:23) (105/61 - 128/76)  BP(mean): --  ABP: --  ABP(mean): --  RR: 20 (26 Jan 2022 10:30) (19 - 20)  SpO2: 91% (26 Jan 2022 10:30) (91% - 99%) on 50% high flow nasal canula     General: Awake. Alert. Confused. Cooperative. No distress Appears stated age.	  HEENT: Atraumatic. Normocephalic. Anicteric. Normal oral mucosa. PERRL. EOMI. High flow nasal canula. Mallampati class IV airway.  Neck: Supple. Trachea midline. Thyroid without enlargement/tenderness/nodules. No carotid bruit. No JVD. Short and wide  Cardiovascular: Regular rate and rhythm. S1 S2 normal. III/VI systolic murmur  Respiratory: Respirations unlabored. Decreased breath sounds left hemithorax. No curvature.  Abdomen: Soft. Non-tender. Non-distended. No organomegaly. No masses. Normal bowel sounds. Obese, PEG.  Extremities: Warm to touch. No clubbing or cyanosis. No pedal edema. LUE PICC line.  Pulses: 2+ peripheral pulses all extremities.	  Skin: Normal skin color. No rashes or lesions. No ecchymoses. No cyanosis. Warm to touch.  Lymph Nodes: Cervical, supraclavicular and axillary nodes normal  Neurological: Left lower extremity plegia with contraction. Left upper extremity is quite weak. A and O x 3  Psychiatry: Calm    LABS:                          See note  Clotted )-----------( Clotted    ( 25 Jan 2022 07:06 )             See note    CBC    WBC  Clotted <==, 6.33 <==, 9.83 <==    Hemoglobin  See note <<==, 9.4 <<==, 9.6 <<==    Hematocrit  See note <==, 31.2 <==, 32.3 <==    Platelets  Clotted <==, 191 <==, 183 <==      138  |  104  |  21  ----------------------------<  138<H>    01-25  6.3<HH>   |  17<L>  |  0.58      LYTES    sodium  138 <==, 144 <==, 141 <==    potassium   6.3 <==, 4.1 <==, 4.9 <==    chloride  104 <==, 106 <==, 103 <==    carbon dioxide  17 <==, 24 <==, 24 <==    =============================================================================================  RENAL FUNCTION:    Creatinine:   0.58  <<==, 0.64  <<==, 0.62  <<==    BUN:   21 <==, 22 <==, 32 <==    ============================================================================================    calcium   9.2 <==, 9.7 <==, 9.9 <==  ============================================================================================  LFTs    AST:   29 <==     ALT:  16  <==     AP:  105  <=    Bili:  0.3  <=    PT/INR - ( 20 Jan 2022 08:53 )   PT: 12.0 sec;   INR: 1.00 ratio      PTT - ( 20 Jan 2022 08:53 )  PTT:31.0 sec    Venous Blood Gas:  01-20 @ 08:53  7.34/55/52/30/83.0  VBG Lactate: 1.7    Serum Pro-Brain Natriuretic Peptide: 148 pg/mL (01-20 @ 08:53)    CARDIAC MARKERS ( 20 Jan 2022 08:53 )  CPK x     /CKMB x     /CKMB Units x        troponin 21 ng/L    < from: Transthoracic Echocardiogram (12.21.21 @ 14:39) >    Patient name: FRANKI MEHAT  YOB: 1935   Age: 86 (F)   MR#: [id number]  Study Date: 12/21/2021  Location: [address] [place], [place] [id number]: Tena Garnett RDCS  Study quality: Technically difficult/Limited  Referring Physician: Edmond Almazan MD  Blood Pressure: 134/77 mmHg  Height: 163 cm  Weight: 109 kg  BSA: 2.1 m2  Heart Rate: 105 mmHg  ------------------------------------------------------------------------  PROCEDURE: Transthoracic echocardiogram with 2-D, M-Mode  and complete spectral andcolor flow Doppler.  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 No segmental  wall motion abnormalities.  Moderate aortic stenosis.  There may be a vegetation on the left or non-coronary cusp  of the aortic valve. Consider transesophageal  echocardiography.  ------------------------------------------------------------------------  Confirmed on  12/22/2021 - 10:32:40 by Rahul Correa M.D.  ------------------------------------------------------------------------    MICROBIOLOGY:     Respiratory Viral Panel with COVID-19 by RYAN (01.20.22 @ 08:52)   Rapid RVP Result: [facility]   SARS-CoV-2: [facility]    Urinalysis Basic - ( 20 Jan 2022 14:56 )    Color: Yellow / Appearance: Slightly Turbid / SG: >1.050 / pH: x  Gluc: x / Ketone: Negative  / Bili: Negative / Urobili: Negative   Blood: x / Protein: 100 / Nitrite: Negative   Leuk Esterase: Large / RBC: 7 /hpf / WBC 25 /HPF   Sq Epi: x / Non Sq Epi: 5 /hpf / Bacteria: Few    Culture - Bronchial (01.06.22 @ 18:48)   Culture Results:   Normal Respiratory Regina present     Culture - Abscess with Gram Stain (12.23.21 @ 18:49)   Specimen Source: .Abscess abdominal abscess   Culture Results:   Few Escherichia coli   Few Morganella morganii   Moderate Enterococcus avium   Numerous Bacteroides thetaiotamcron group "Susceptibilities not performed"   Numerous Clostridium ramosum "Susceptibilities not performed"     Culture - Blood (12.19.21 @ 14:24)   Culture Results:   Growth in aerobic bottle: Staphylococcus epidermidis   Organism Identification: Staphylococcus epidermidis     Culture - Blood (12.19.21 @ 14:24)   Gram Stain:   Growth in aerobic bottle: Gram Positive Cocci in Clusters   Growth in anaerobic bottle: Gram Positive Cocci in Clusters   - Staphylococcus epidermidis, Methicillin resistant: Detec     RADIOLOGY:  [x] Chest radiographs reviewed and interpreted by me    CXR 1/23 "to my eye" -> partial reexpansion of the left upper lobe  ---------------------------------------------------------------------------------------------------------------  < from: Xray Chest 1 View- PORTABLE-Urgent (Xray Chest 1 View- PORTABLE-Urgent .) (01.23.22 @ 16:55) >    EXAM:  XR CHEST PORTABLE URGENT 1V                          PROCEDURE DATE:  01/23/2022      INTERPRETATION:  EXAMINATION: XR CHEST URGENT    CLINICAL INDICATION: PICC placement    TECHNIQUE: Single frontal, portable view of the chest was obtained.    COMPARISON: Chest radiograph 1/21/2022.    FINDINGS:  Left-sided PICC line tip terminates at the left brachiocephalic/SVC   junction.  The heart is enlarged.  Hazy opacification of the left lower lung field which may represent   atelectasis or pleural effusion.. Is not significant change from the   prior study.  No pneumothorax.    IMPRESSION:  Left-sided PICC line tip terminates at the left brachiocephalic/SVC   junction.    MOUSTAPHA COURTNEY MD; Resident Radiology  This document has been electronically signed.  DIANE CLARK MD; Attending Radiologist  This document has been electronically signed. Jan 24 2022  5:24PM  ---------------------------------------------------------------------------------------------------------------  < from: CT Abdomen and Pelvis w/ IV Cont (01.20.22 @ 10:35) >    EXAM:  CT ABDOMEN AND PELVIS IC                        EXAM:  CT ANGIO CHEST PULM ART [facility]                          PROCEDURE DATE:  01/20/2022      FINDINGS:    CHEST:    PULMONARY ANGIOGRAM: Limited study secondary to extensive respiratory   motion artifact. No evidence of large central pulmonary embolism, with   limited evaluation of lobar, segmental and subsegmental branches. The   pulmonary artery is enlarged measuring 3.3 cm, nonspecific although can   be seen with pulmonary hypertension.    MEDIASTINUM/LYMPH NODES: No mediastinal or hilar lymphadenopathy. Small   hiatal hernia.    AIRWAYS/LUNGS/PLEURA: Degraded by respiratory motion artifact. There is   partial left upper lobe and complete lingular and left lower lobe   collapse with superimposed bronchial impaction. There is a small left   pleural effusion with intrafissural extension. There are patchy   groundglass opacities at the right apex, likely infectious/inflammatory.   No pneumothorax. Mild subpleural reticulation along the right middle lobe   may be secondary to right breast/chest wall radiotherapy.    HEART/VASCULATURE: Heart size is normal. No pericardial effusion. Mild   aortic valvular and mitral annular calcification. A left PICC is present   which has repositioned between the acquisition of the  imaging   (terminating in the SVC on ) and the chest CTA, now crossing midline   into the right innominate vein.    CHEST WALL AND LOWER NECK: The thyroid is incompletely characterized on   this study. Status post right mastectomy.    ABDOMEN AND PELVIS:    LIVER: Within normal limits. Calcified hepatic granuloma.    BILE DUCTS: No intrahepatic biliary ductal dilatation. Redemonstration of   choledocholithiasis in the ampullary region, unchanged since 12/30/2021.    GALLBLADDER: Cholelithiasis.    SPLEEN: Within normal limits.    PANCREAS: 1 cm pancreatic tail cyst.    ADRENALS: Within normal limits.    KIDNEYS/URETERS: Mildly atrophic left kidney. No hydronephrosis.    BLADDER: Within normal limits.    REPRODUCTIVE ORGANS: Uterus present. There is a 1.4 cm right adnexal cyst.    BOWEL: Percutaneous gastrostomy tube present with intraluminal balloon.   No bowel obstruction. Colonic diverticulosis without diverticulitis.   Interval removal of the right lower quadrant pigtail catheter in the   region of the appendix. There is a residual, multilocular 4.0 x 2.2 cm   collection with peripheral enhancement (series 3, image 84) in the region   of previously perforated appendix. Previously seen periappendiceal fat   stranding has largely resolved.    PERITONEUM: No ascites.    VESSELS: Aortic calcifications. Hypodense lesion noted at the origin of   the SMA which was previously seen but not as well appreciated on prior   noncontrast enhanced study on 12/30/2021. SMA however appears patent.    RETROPERITONEUM/LYMPH NODES: No lymphadenopathy.    ABDOMINAL WALL: . Fat-containing umbilical hernia.    BONES: Remote left proximal humeral fracture status post ORIF. Unchanged   T11 and L1 superior endplate compression deformities.    IMPRESSION:  Limited exam for pulmonary embolism; no central pulmonary embolism with   nondiagnostic evaluation of lobar, segmental and subsegmental branches.   No imaging evidence of right heart strain.    Complete lingular and left lower lobe collapse withsuperimposed   bronchial impaction. Small left pleural effusion. Mild patchy right   apical groundglass may be related to pulmonary edema, infection or   inflammation.    Multilocular 4.0 x 2.2 cm right lower quadrant fluid collection in the   regionof previously perforated appendix, status post drain removal.   Periappendiceal fat stranding has largely resolved.    Left PICC crosses midline and terminates in the right innominate vein,   likely repositioned during the administration of contrast between    imaging and CTA acquisition.    Cholelithiasis and choledocholithiasis. No intrahepatic biliary ductal   dilatation.    MARKO GUAMAN DO; Resident Radiologist  This document has been electronically signed.  SHARATH TURCIOS M.D., Attending Radiologist  This document has been electronically signed. Jan 20 2022 12:48PM  ---------------------------------------------------------------------------------------------------------------  < from: CT Head No Cont (01.20.22 @ 10:35) >    EXAM:  CT BRAIN                          PROCEDURE DATE:  01/20/2022      FINDINGS:    Study is limited by motion.    No hydrocephalus, midline shift, or effacement of the basal cisterns. No   gross evidence for acute intracranial hemorrhage or brain edema.    Complete opacification of the right maxillary sinus and right mastoid air   cells.    IMPRESSION:    Limited by motion.    No gross evidence for acute intracranial hemorrhage or brain edema.    No hydrocephalus or midline shift.    Complete opacification of the right maxillary sinus and right mastoid air   cells. Correlate for the presence of sinusitis/mastoiditis.    LESLI KING MD; Attending Radiologist  This document has been electronically signed. Jan 20 2022 10:38AM  ---------------------------------------------------------------------------------------------------------------

## 2022-01-26 NOTE — PROGRESS NOTE ADULT - ASSESSMENT
ASSESSMENT:    86 year old gentlewoman, lifelong non-smoker, without history of intrinsic lung disease. 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 She also has a history of HTN, HLD, CAD s/p MI with preserved LVEF and moderate aortic stenosis. The patient was admitted to [facility] on December 19th 2021 with fever and abdominal pain. She was found to have perforated appendicitis with abscess formation. She was treated with tube drainage and antibiotics for a polymicrobial infection. She continues on vancomycin/ertapenem via a PICC line. Admission CT had debris within the left lobar and more distal airways of the left lower lobe as well as some debris within the right lower lobe airway - there was complete atelectasis of the left lower lobe and subsegmental atelectatic changes within the left upper lobe and dependent portions of the right lung. The patient developed increased work of breathing, tachycardia, tachypnea and hypoxemia on January 4th due to mucous plugging with complete collapse of the left lung. She underwent bronchoscopy on January 6th with removal of copious amounts of purulent secretions from throughout the bronchial tree - there was severe tracheobronchomalacia. Bronchial cultures were negative. She was treated with bronchodilators, mucolytic nebs, chest vest and nocturnal AVAPS with expansion of the left upper lobe and some reexpansion of the left lower lobe. She was discharged to rehab on January 11th on a 3lpm nasal canula with plans to continue medical and mechanical therapy as well as nocturnal BIPAP. The patient returns from rehab with decreased mental status, dyspnea and hypoxemia treated with 100% NRB. She currently is obtunded on a BIPAP device. CT scan -> partial left upper lobe and complete lingular and left lower lobe collapse with superimposed bronchial impaction - small left pleural effusion with intrafissural extension - patchy groundglass opacities at the right apex. The patient has redeveloped left lung atelectasis due to mucous plugging -> multifactorial. There is an area of inflammation/infection in the right upper lobe  1) tracheobronchomalacia with marked expiratory airway narrowing preventing adequate sputum clearance  2) weak cough following a CVA with marked deconditioning   3) dysphagia with ongoing aspiration of oral secretions  4) moderate aortic stenosis with a small left pleural effusion    PLAN/RECOMMENDATIONS:    oxygen supplementation using a high flow nasal canula -> decrease to 50% FiO2 -> will continue to taper as tolerated  following CXR  bronchoscopy cannot be performed due to the patient's tenuous respiratory status - she would unlikely be able to be "liberated" from a ventilator if intubated  nocturnal AVAPS to facilitate lung expansion  s/p bronchoscopy 1/6 with removal of copious amounts of purulent mucous from throughout the airways - there was severe tracheobronchomalacia -> cultures are negative  vancomycin/ertapenem  albuterol/atrovent nebs q6h  hypertonic saline and mucomyst nebs to facilitate mucous clearance  guaifenesin via PEG  chest vest/manual chest PT  cough assist device  cardiac meds: lopressor/norvasc/lipitor  DVT prophylaxis - SQ heparin  allopurinol/keppra/synthroid  glucose control  abdominal/pelvic CT - percutaneous gastrostomy tube present with intraluminal balloon - colonic diverticulosis without diverticulitis - interval removal of the right lower quadrant pigtail catheter in the region of the appendix - there is a residual, multilocular 4.0 x 2.2 cm collection with peripheral enhancement in the region of the previously perforated appendix - periappendiceal fat stranding has largely resolved - ID/IR/surgery evaluations noted - no intervention is needed    I am not hopeful that the left lung will expand due to the multiple issues delineated above - suspect that she will need to be treated with oxygen supplementation to keep saturation greater than 90% accepting the fact that her left lung will be basically non-functional    Will follow with you. Plan of care discussed with the patient at bedside, with the dedicated floor NP, with Dr. Segundo and with her son Luis by phone    Kennedy Marcano MD, [facility]  [phone]  Pulmonary Medicine

## 2022-01-26 NOTE — CHART NOTE - NSCHARTNOTEFT_GEN_A_CORE
NP note -  PICC f/u    d/w IR for PICC mal-location. pt went down to IR procedure yesterday and desaturated in the IR room. IR recommended Cathflo first before 2nd attempt. s/p cath alpa and it works now. IR made aware.     NP. Jeremy Alvarez   [id number]

## 2022-01-26 NOTE — PROGRESS NOTE ADULT - SUBJECTIVE AND OBJECTIVE BOX
afberile  REVIEW OF SYSTEMS:  GEN: no fever,    no chills  RESP: no SOB,   no cough  CVS: no chest pain,   no palpitations  GI: no abdominal pain,   no nausea,   no vomiting,   no constipation,   no diarrhea  : no dysuria,   no frequency  NEURO: no headache,   no dizziness  PSYCH: no depression,   not anxious  Derm : no rash    MEDICATIONS  (STANDING):  acetylcysteine 20%  Inhalation 4 milliLiter(s) Inhalation every 6 hours  albuterol/ipratropium for Nebulization 3 milliLiter(s) Nebulizer every 6 hours  allopurinol 100 milliGRAM(s) Oral daily  amLODIPine   Tablet 10 milliGRAM(s) Oral daily  artificial  tears Solution 1 Drop(s) Both EYES three times a day  atorvastatin 20 milliGRAM(s) Oral at bedtime  chlorhexidine 2% Cloths 1 Application(s) Topical <User Schedule>  ertapenem  IVPB 1000 milliGRAM(s) IV Intermittent every 24 hours  heparin SubCutaneous Injection - Peds 5000 Unit(s) SubCutaneous every 8 hours  levETIRAcetam  Solution 750 milliGRAM(s) Oral two times a day  levothyroxine 75 MICROGram(s) Oral daily  metoprolol tartrate 25 milliGRAM(s) Oral two times a day  sodium chloride 3%  Inhalation 4 milliLiter(s) Inhalation every 6 hours  vancomycin  IVPB 1000 milliGRAM(s) IV Intermittent every 24 hours    MEDICATIONS  (PRN):  acetaminophen    Suspension .. 650 milliGRAM(s) Oral every 6 hours PRN Temp greater or equal to 38C (100.4F), Mild Pain (1 - 3)      Vital Signs Last 24 Hrs  T(C): 36.6 (26 Jan 2022 04:23), Max: 37 (25 Jan 2022 15:44)  T(F): 97.8 (26 Jan 2022 04:23), Max: 98.6 (25 Jan 2022 15:44)  HR: 86 (26 Jan 2022 04:23) (75 - 93)  BP: 105/61 (26 Jan 2022 04:23) (105/61 - 128/76)  BP(mean): --  RR: 19 (26 Jan 2022 04:23) (19 - 20)  SpO2: 92% (26 Jan 2022 04:23) (92% - 99%)  CAPILLARY BLOOD GLUCOSE        I&O's Summary    24 Jan 2022 07:01  -  25 Jan 2022 07:00  --------------------------------------------------------  IN: 370 mL / OUT: 800 mL / NET: -430 mL    25 Jan 2022 07:01  -  26 Jan 2022 06:44  --------------------------------------------------------  IN: 0 mL / OUT: 850 mL / NET: -850 mL        PHYSICAL EXAM:  HEAD:  Atraumatic, Normocephalic  NECK: Supple, No   JVD  CHEST/LUNG:   no     rales,     no,    rhonchi  HEART: Regular rate and rhythm;         murmur  ABDOMEN: Soft, Nontender, ;   EXTREMITIES:     no   edema  NEUROLOGY:  alert    LABS:                        See note  Clotted )-----------( Clotted    ( 25 Jan 2022 07:06 )             See note    01-25    138  |  104  |  21  ----------------------------<  138<H>  6.3<HH>   |  17<L>  |  0.58    Ca    9.2      25 Jan 2022 07:02                      Thyroid Stimulating Hormone, Serum: 2.39 uIU/mL (01-07 @ 09:13)          Consultant(s) Notes Reviewed:      Care Discussed with Consultants/Other Providers:

## 2022-01-26 NOTE — PROGRESS NOTE ADULT - ASSESSMENT
85 y/o female, hx of CVA, MI, breast CA, COPD, not on home 02, s/p recent  perforated  appendox/ abscess,  HN, HLD, hypothyroid,  obexity, relatively  be d boind, , gout BIBEMS from [facility] due to SOB., started this morning patient initial 02 sat was 84% placed on NRB and improved to 93 with recent perforated appendicitis, possible IE on long term iv abx     John Fragoso  Attending Physician   Division of Infectious Disease  Pager #[phone]  Available on Microsoft Teams also  After 5pm/weekend or no response, call #[phone]

## 2022-01-26 NOTE — PROGRESS NOTE ADULT - SUBJECTIVE AND OBJECTIVE BOX
FRANKI MEHTA 86y [id number]    Patient is a 86y old  Female who presents with a chief complaint of sob (26 Jan 2022 06:44)      Follow Up/CC:  ID following for bacteremia    Interval History/ROS: no fever    Allergies    Toradol (Urticaria (Mild to Mod); Rash (Mild to Mod))    Intolerances        ANTIMICROBIALS:  vancomycin  IVPB 1000 every 24 hours      MEDICATIONS  (STANDING):  acetylcysteine 20%  Inhalation 4 milliLiter(s) Inhalation every 6 hours  albuterol/ipratropium for Nebulization 3 milliLiter(s) Nebulizer every 6 hours  allopurinol 100 milliGRAM(s) Oral daily  alteplase for catheter clearance 2 milliGRAM(s) Catheter once  amLODIPine   Tablet 10 milliGRAM(s) Oral daily  artificial  tears Solution 1 Drop(s) Both EYES three times a day  atorvastatin 20 milliGRAM(s) Oral at bedtime  chlorhexidine 2% Cloths 1 Application(s) Topical <User Schedule>  heparin SubCutaneous Injection - Peds 5000 Unit(s) SubCutaneous every 8 hours  levETIRAcetam  Solution 750 milliGRAM(s) Oral two times a day  levothyroxine 75 MICROGram(s) Oral daily  metoprolol tartrate 25 milliGRAM(s) Oral two times a day  sodium chloride 3%  Inhalation 4 milliLiter(s) Inhalation every 6 hours  vancomycin  IVPB 1000 milliGRAM(s) IV Intermittent every 24 hours    MEDICATIONS  (PRN):  acetaminophen    Suspension .. 650 milliGRAM(s) Oral every 6 hours PRN Temp greater or equal to 38C (100.4F), Mild Pain (1 - 3)        Vital Signs Last 24 Hrs  T(C): 37 (26 Jan 2022 12:28), Max: 37 (25 Jan 2022 15:44)  T(F): 98.6 (26 Jan 2022 12:28), Max: 98.6 (25 Jan 2022 15:44)  HR: 83 (26 Jan 2022 12:28) (71 - 102)  BP: 129/76 (26 Jan 2022 12:28) (105/61 - 129/76)  BP(mean): --  RR: 19 (26 Jan 2022 12:28) (19 - 20)  SpO2: 92% (26 Jan 2022 12:28) (91% - 99%)    CBC Full  -  ( 26 Jan 2022 11:52 )  WBC Count : 6.44 K/uL  RBC Count : 2.82 M/uL  Hemoglobin : 9.4 g/dL  Hematocrit : 30.2 %  Platelet Count - Automated : 192 K/uL  Mean Cell Volume : 107.1 fl  Mean Cell Hemoglobin : 33.3 pg  Mean Cell Hemoglobin Concentration : 31.1 gm/dL  Auto Neutrophil # : x  Auto Lymphocyte # : x  Auto Monocyte # : x  Auto Eosinophil # : x  Auto Basophil # : x  Auto Neutrophil % : x  Auto Lymphocyte % : x  Auto Monocyte % : x  Auto Eosinophil % : x  Auto Basophil % : x    01-26    142  |  107  |  21  ----------------------------<  129<H>  4.6   |  22  |  0.63    Ca    9.3      26 Jan 2022 11:52            MICROBIOLOGY:  Clean Catch Clean Catch (Midstream)  01-20-22   >100,000 CFU/ml Enterococcus faecium (vancomycin resistant)  <10,000 CFU/ml Normal Urogenital ck present  --  Enterococcus faecium (vancomycin resistant)      .Blood Blood-Peripheral  01-20-22   No Growth Final  --  --      BAL Bronchoalveolar Lavage  01-06-22   No growth at 1 week.  --    No polymorphonuclear cells seen per low power field  No squamous epithelial cells per low power field  No organisms seen per oil power field              Vancomycin Level, Trough: 17.7 ug/mL (01-25-22 @ 20:26)  v    Rapid RVP Result: NotDetec (01-20 @ 08:52)          RADIOLOGY

## 2022-01-27 NOTE — PROGRESS NOTE ADULT - SUBJECTIVE AND OBJECTIVE BOX
FRANKI MEHTA 86y [id number]    Patient is a 86y old  Female who presents with a chief complaint of sob (27 Jan 2022 07:05)      Follow Up/CC:  ID following for     Interval History/ROS:    Allergies    Toradol (Urticaria (Mild to Mod); Rash (Mild to Mod))    Intolerances        ANTIMICROBIALS:  ertapenem  IVPB 1000 every 24 hours      MEDICATIONS  (STANDING):  acetylcysteine 20%  Inhalation 4 milliLiter(s) Inhalation every 6 hours  albuterol/ipratropium for Nebulization 3 milliLiter(s) Nebulizer every 6 hours  allopurinol 100 milliGRAM(s) Oral daily  amLODIPine   Tablet 10 milliGRAM(s) Oral daily  artificial  tears Solution 1 Drop(s) Both EYES three times a day  atorvastatin 20 milliGRAM(s) Oral at bedtime  chlorhexidine 2% Cloths 1 Application(s) Topical <User Schedule>  ertapenem  IVPB 1000 milliGRAM(s) IV Intermittent every 24 hours  heparin   Injectable 5000 Unit(s) SubCutaneous every 8 hours  levETIRAcetam  Solution 750 milliGRAM(s) Oral two times a day  levothyroxine 75 MICROGram(s) Oral daily  metoprolol tartrate 25 milliGRAM(s) Oral two times a day  sodium chloride 3%  Inhalation 4 milliLiter(s) Inhalation every 6 hours    MEDICATIONS  (PRN):  acetaminophen    Suspension .. 650 milliGRAM(s) Oral every 6 hours PRN Temp greater or equal to 38C (100.4F), Mild Pain (1 - 3)        Vital Signs Last 24 Hrs  T(C): 36.9 (27 Jan 2022 04:01), Max: 37 (26 Jan 2022 12:28)  T(F): 98.5 (27 Jan 2022 04:01), Max: 98.6 (26 Jan 2022 12:28)  HR: 95 (27 Jan 2022 09:55) (83 - 102)  BP: 121/67 (27 Jan 2022 09:42) (107/64 - 132/73)  BP(mean): --  RR: 20 (27 Jan 2022 09:55) (19 - 20)  SpO2: 93% (27 Jan 2022 09:55) (91% - 96%)    CBC Full  -  ( 27 Jan 2022 05:56 )  WBC Count : 5.94 K/uL  RBC Count : 3.01 M/uL  Hemoglobin : 9.6 g/dL  Hematocrit : 32.6 %  Platelet Count - Automated : 195 K/uL  Mean Cell Volume : 108.3 fl  Mean Cell Hemoglobin : 31.9 pg  Mean Cell Hemoglobin Concentration : 29.4 gm/dL  Auto Neutrophil # : x  Auto Lymphocyte # : x  Auto Monocyte # : x  Auto Eosinophil # : x  Auto Basophil # : x  Auto Neutrophil % : x  Auto Lymphocyte % : x  Auto Monocyte % : x  Auto Eosinophil % : x  Auto Basophil % : x    01-27    144  |  106  |  24<H>  ----------------------------<  122<H>  4.2   |  23  |  0.65    Ca    9.6      27 Jan 2022 05:56            MICROBIOLOGY:  Clean Catch Clean Catch (Midstream)  01-20-22   >100,000 CFU/ml Enterococcus faecium (vancomycin resistant)  <10,000 CFU/ml Normal Urogenital ck present  --  Enterococcus faecium (vancomycin resistant)      .Blood Blood-Peripheral  01-20-22   No Growth Final  --  --      BAL Bronchoalveolar Lavage  01-06-22   No growth at 1 week.  --    No polymorphonuclear cells seen per low power field  No squamous epithelial cells per low power field  No organisms seen per oil power field              Vancomycin Level, Trough: 22.1 ug/mL (01-27-22 @ 05:56)  v            RADIOLOGY     FRANKI MEHTA 86y [id number]    Patient is a 86y old  Female who presents with a chief complaint of sob (27 Jan 2022 07:05)      Follow Up/CC:  ID following for bacteremia    Interval History/ROS: no fever    Allergies    Toradol (Urticaria (Mild to Mod); Rash (Mild to Mod))    Intolerances        ANTIMICROBIALS:  ertapenem  IVPB 1000 every 24 hours      MEDICATIONS  (STANDING):  acetylcysteine 20%  Inhalation 4 milliLiter(s) Inhalation every 6 hours  albuterol/ipratropium for Nebulization 3 milliLiter(s) Nebulizer every 6 hours  allopurinol 100 milliGRAM(s) Oral daily  amLODIPine   Tablet 10 milliGRAM(s) Oral daily  artificial  tears Solution 1 Drop(s) Both EYES three times a day  atorvastatin 20 milliGRAM(s) Oral at bedtime  chlorhexidine 2% Cloths 1 Application(s) Topical <User Schedule>  ertapenem  IVPB 1000 milliGRAM(s) IV Intermittent every 24 hours  heparin   Injectable 5000 Unit(s) SubCutaneous every 8 hours  levETIRAcetam  Solution 750 milliGRAM(s) Oral two times a day  levothyroxine 75 MICROGram(s) Oral daily  metoprolol tartrate 25 milliGRAM(s) Oral two times a day  sodium chloride 3%  Inhalation 4 milliLiter(s) Inhalation every 6 hours    MEDICATIONS  (PRN):  acetaminophen    Suspension .. 650 milliGRAM(s) Oral every 6 hours PRN Temp greater or equal to 38C (100.4F), Mild Pain (1 - 3)        Vital Signs Last 24 Hrs  T(C): 36.9 (27 Jan 2022 04:01), Max: 37 (26 Jan 2022 12:28)  T(F): 98.5 (27 Jan 2022 04:01), Max: 98.6 (26 Jan 2022 12:28)  HR: 95 (27 Jan 2022 09:55) (83 - 102)  BP: 121/67 (27 Jan 2022 09:42) (107/64 - 132/73)  BP(mean): --  RR: 20 (27 Jan 2022 09:55) (19 - 20)  SpO2: 93% (27 Jan 2022 09:55) (91% - 96%)    CBC Full  -  ( 27 Jan 2022 05:56 )  WBC Count : 5.94 K/uL  RBC Count : 3.01 M/uL  Hemoglobin : 9.6 g/dL  Hematocrit : 32.6 %  Platelet Count - Automated : 195 K/uL  Mean Cell Volume : 108.3 fl  Mean Cell Hemoglobin : 31.9 pg  Mean Cell Hemoglobin Concentration : 29.4 gm/dL  Auto Neutrophil # : x  Auto Lymphocyte # : x  Auto Monocyte # : x  Auto Eosinophil # : x  Auto Basophil # : x  Auto Neutrophil % : x  Auto Lymphocyte % : x  Auto Monocyte % : x  Auto Eosinophil % : x  Auto Basophil % : x    01-27    144  |  106  |  24<H>  ----------------------------<  122<H>  4.2   |  23  |  0.65    Ca    9.6      27 Jan 2022 05:56            MICROBIOLOGY:  Clean Catch Clean Catch (Midstream)  01-20-22   >100,000 CFU/ml Enterococcus faecium (vancomycin resistant)  <10,000 CFU/ml Normal Urogenital ck present  --  Enterococcus faecium (vancomycin resistant)      .Blood Blood-Peripheral  01-20-22   No Growth Final  --  --      BAL Bronchoalveolar Lavage  01-06-22   No growth at 1 week.  --    No polymorphonuclear cells seen per low power field  No squamous epithelial cells per low power field  No organisms seen per oil power field      Vancomycin Level, Trough: 22.1 ug/mL (01-27-22 @ 05:56)        RADIOLOGY    < from: Xray Chest 1 View- PORTABLE-Urgent (Xray Chest 1 View- PORTABLE-Urgent .) (01.23.22 @ 16:55) >  Left-sided PICC line tip terminates at the left brachiocephalic/SVC   junction.    < end of copied text >

## 2022-01-27 NOTE — PROGRESS NOTE ADULT - ASSESSMENT
86   year old female    h/o hemorrhagic CVA, has  PEG,  HTN, MI,   HLD, gout, right ca  breast   right Ca breast. s/p mastectomy in 2019,  s/p  sentinel node  localization.  4/4,  were  negative  peg dislodged.  IR   replacements  on 1/3/22   picc  and iv ab  for 6 weeks, per  ID, on  last  visit  s/p rrt   for hypoxia. cxr with complete  collapsed  of  left lung, needing broch, by  puledson mir   s/ p  bronchoscopy , pt  with  tracheomalacia  and  collapsed  airways,  makes  this a  difficult  situation, as there is no good rx for this          *  p/w   sob. acute  resp failure  on Bipap,  from left   lung  collapse  afberile,  with normal  wbc  on arrival  *   s/p cva, has  PEG,  npo  ,  on  synthroid, Keppra  ,  *  HTN, on meds  per  card  *  h/o ca breast. /, s/p  R  mastectomy  * obesity, bmi  was   41, now  is  34 in  er  left  leg contracture ,  remains  bed  bound. functional  paraplegias  needs  assistance  for  all her  adl's  *   h/o bacteremia. on 12/19/21,  Staph epi, meth R,  from perforated  appendicitis  ct  c/a/p, from last  visit   pna, perforated  appendicitis,  ?  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 this will not alter rx. pt  will need   extended  course  of ab     on iv  vanco and ertapenem.  till  2/9/.22  ID dr reagan, and per  surg  and  IR  eval,  no intervnetion  CT  1/20/22, no PE. collection in right side   on Proventil,  Mucomyst and   saline  nebs.   * pt  with  h/o  tracheomalacia  and  collapsed  airways,  makes  this a  difficult  situation, as there is no good rx for this  given pt;s  mlple co morbidities,  pt is  at risk for  re admissions  picc  line/  on nocturnal  awaps  on hi flow, remains hypoxic  on  hi flow,  supportive  care  perhaps  pt is  an rcu candidate/  on oxygen   picc  line  f/p by picc  nurse  difficult  situation, a s therte  is no  good  rx  for  pt's  recurrent lung collapse hypoxia       per  son, pt is  full code     rad< from: CT Angio Chest PE Protocol w/ IV Cont (01.20.22 @ 10:35) >  IMPRESSION:  Limited exam for pulmonary embolism; no central pulmonary embolism with   nondiagnostic evaluation of lobar, segmental and subsegmental branches.   No imaging evidence of right heart strain.  Complete lingular and left lower lobe collapse withsuperimposed   bronchial impaction. Small left pleural effusion. Mild patchy right   apical groundglass may be related to pulmonary edema, infection or   inflammation.  Multilocular 4.0 x 2.2 cm right lower quadrant fluid collection in the   regionof previously perforated appendix, status post drain removal.   Periappendiceal fat stranding has largely resolved.  Left PICC crosses midline and terminates in the right innominate vein,   likely repositioned during the administration of contrast between    imaging and CTA acquisition.  Cholelithiasis and choledocholithiasis. No intrahepatic biliary ductal   dilatation.  --- End of Report ---  < end of copied text >

## 2022-01-27 NOTE — PROGRESS NOTE ADULT - SUBJECTIVE AND OBJECTIVE BOX
afberile/  hypoxic  REVIEW OF SYSTEMS:  GEN: no fever,    no chills  RESP: no SOB,   no cough  CVS: no chest pain,   no palpitations  GI: no abdominal pain,   no nausea,   no vomiting,   no constipation,   no diarrhea  : no dysuria,   no frequency  NEURO: no headache,   no dizziness  PSYCH: no depression,   not anxious  Derm : no rash    MEDICATIONS  (STANDING):  acetylcysteine 20%  Inhalation 4 milliLiter(s) Inhalation every 6 hours  albuterol/ipratropium for Nebulization 3 milliLiter(s) Nebulizer every 6 hours  allopurinol 100 milliGRAM(s) Oral daily  amLODIPine   Tablet 10 milliGRAM(s) Oral daily  artificial  tears Solution 1 Drop(s) Both EYES three times a day  atorvastatin 20 milliGRAM(s) Oral at bedtime  chlorhexidine 2% Cloths 1 Application(s) Topical <User Schedule>  heparin SubCutaneous Injection - Peds 5000 Unit(s) SubCutaneous every 8 hours  levETIRAcetam  Solution 750 milliGRAM(s) Oral two times a day  levothyroxine 75 MICROGram(s) Oral daily  metoprolol tartrate 25 milliGRAM(s) Oral two times a day  sodium chloride 3%  Inhalation 4 milliLiter(s) Inhalation every 6 hours  vancomycin  IVPB 1000 milliGRAM(s) IV Intermittent every 24 hours    MEDICATIONS  (PRN):  acetaminophen    Suspension .. 650 milliGRAM(s) Oral every 6 hours PRN Temp greater or equal to 38C (100.4F), Mild Pain (1 - 3)      Vital Signs Last 24 Hrs  T(C): 36.9 (27 Jan 2022 04:01), Max: 37 (26 Jan 2022 12:28)  T(F): 98.5 (27 Jan 2022 04:01), Max: 98.6 (26 Jan 2022 12:28)  HR: 84 (27 Jan 2022 06:22) (83 - 102)  BP: 107/70 (27 Jan 2022 04:01) (107/64 - 132/73)  BP(mean): --  RR: 20 (27 Jan 2022 06:18) (19 - 20)  SpO2: 93% (27 Jan 2022 06:22) (91% - 96%)  CAPILLARY BLOOD GLUCOSE        I&O's Summary    26 Jan 2022 07:01  -  27 Jan 2022 07:00  --------------------------------------------------------  IN: 900 mL / OUT: 650 mL / NET: 250 mL        PHYSICAL EXAM:  HEAD:  Atraumatic, Normocephalic  NECK: Supple, No   JVD  CHEST/LUNG:   no     rales,     no,    rhonchi  HEART: Regular rate and rhythm;         murmur  ABDOMEN: Soft, Nontender, ;   EXTREMITIES:   no     edema  NEUROLOGY:  alert    LABS:                        9.6    5.94  )-----------( 195      ( 27 Jan 2022 05:56 )             32.6     01-27    144  |  106  |  24<H>  ----------------------------<  122<H>  4.2   |  23  |  0.65    Ca    9.6      27 Jan 2022 05:56                      Thyroid Stimulating Hormone, Serum: 2.39 uIU/mL (01-07 @ 09:13)          Consultant(s) Notes Reviewed:      Care Discussed with Consultants/Other Providers:

## 2022-01-27 NOTE — PROGRESS NOTE ADULT - SUBJECTIVE AND OBJECTIVE BOX
[facility] - [facility] - PROGRESS NOTE    CHIEF COMPLAINT: acute hypoxic respiratory failure; mucous plugging; left lung atelectasis; weak cough; dysphagia; tracheobronchomalacia; PEG;     INTERVAL HISTORY: awake and alert but somewhat confused - little insight into her medical condition; no shortness of breath or hypoxemia on a 50% high flow nasal canula; wore AVAPS overnight; no cough, sputum production, hemoptysis, chest congestion or wheeze; no fevers, chills or sweats; no chest pain/pressure or palpitations; bedbound    REVIEW OF SYSTEMS:  Constitutional: As per interval history  HEENT: Within normal limits  CV: As per interval history  Resp: As per interval history  GI: dysphagia -> PEG  : Within normal limits  Musculoskeletal: Within normal limits  Skin: Within normal limits  Neurological: CVA - > left sided weakness/plegia  Psychiatric: Within normal limits  Endocrine: Within normal limits  Hematologic/Lymphatic: Within normal limits  Allergic/Immunologic: Within normal limits    MEDICATIONS:     Pulmonary "  acetylcysteine 20%  Inhalation 4 milliLiter(s) Inhalation every 6 hours  albuterol/ipratropium for Nebulization 3 milliLiter(s) Nebulizer every 6 hours  sodium chloride 3%  Inhalation 4 milliLiter(s) Inhalation every 6 hours    Anti-microbials:  ertapenem  IVPB 1000 milliGRAM(s) IV Intermittent every 24 hours    Cardiovascular:  amLODIPine   Tablet 10 milliGRAM(s) Oral daily  metoprolol tartrate 25 milliGRAM(s) Oral two times a day    Other:  allopurinol 100 milliGRAM(s) Oral daily  artificial  tears Solution 1 Drop(s) Both EYES three times a day  atorvastatin 20 milliGRAM(s) Oral at bedtime  chlorhexidine 2% Cloths 1 Application(s) Topical <User Schedule>  heparin   Injectable 5000 Unit(s) SubCutaneous every 8 hours  levETIRAcetam  Solution 750 milliGRAM(s) Oral two times a day  levothyroxine 75 MICROGram(s) Oral daily    MEDICATIONS  (PRN):  acetaminophen    Suspension .. 650 milliGRAM(s) Oral every 6 hours PRN Temp greater or equal to 38C (100.4F), Mild Pain (1 - 3)    OBJECTIVE:    I&O's Detail    26 Jan 2022 07:01  -  27 Jan 2022 07:00  --------------------------------------------------------  IN:    Enteral Tube Flush: 150 mL    IV PiggyBack: 250 mL    Jevity 1.2: 500 mL  Total IN: 900 mL    OUT:    Voided (mL): 650 mL  Total OUT: 650 mL    Total NET: 250 mL    PHYSICAL EXAM:       ICU Vital Signs Last 24 Hrs  T(C): 36.5 (27 Jan 2022 12:18), Max: 37 (26 Jan 2022 12:28)  T(F): 97.7 (27 Jan 2022 12:18), Max: 98.6 (26 Jan 2022 12:28)  HR: 84 (27 Jan 2022 12:18) (83 - 102)  BP: 117/72 (27 Jan 2022 12:18) (107/64 - 132/73)  BP(mean): --  ABP: --  ABP(mean): --  RR: 19 (27 Jan 2022 12:18) (19 - 20)  SpO2: 91% (27 Jan 2022 12:18) (91% - 96%) on 50% high flow nasal canula     General: Awake. Alert. Confused. Cooperative. No distress Appears stated age.	  HEENT: Atraumatic. Normocephalic. Anicteric. Normal oral mucosa. PERRL. EOMI. High flow nasal canula. Mallampati class IV airway.  Neck: Supple. Trachea midline. Thyroid without enlargement/tenderness/nodules. No carotid bruit. No JVD. Short and wide  Cardiovascular: Regular rate and rhythm. S1 S2 normal. III/VI systolic murmur  Respiratory: Respirations unlabored. Decreased breath sounds left hemithorax. No curvature.  Abdomen: Soft. Non-tender. Non-distended. No organomegaly. No masses. Normal bowel sounds. Obese, PEG.  Extremities: Warm to touch. No clubbing or cyanosis. No pedal edema. LUE PICC line.  Pulses: 2+ peripheral pulses all extremities.	  Skin: Normal skin color. No rashes or lesions. No ecchymoses. No cyanosis. Warm to touch.  Lymph Nodes: Cervical, supraclavicular and axillary nodes normal  Neurological: Left lower extremity plegia with contraction. Left upper extremity is quite weak. A and O x 3  Psychiatry: Calm    LABS:                          9.6    5.94  )-----------( 195      ( 27 Jan 2022 05:56 )             32.6     CBC    WBC  5.94 <==, 6.44 <==, Clotted <==, 6.33 <==    Hemoglobin  9.6 <<==, 9.4 <<==, See note <<==, 9.4 <<==    Hematocrit  32.6 <==, 30.2 <==, See note <==, 31.2 <==    Platelets  195 <==, 192 <==, Clotted <==, 191 <==      144  |  106  |  24<H>  ----------------------------<  122<H>    01-27  4.2   |  23  |  0.65      LYTES    sodium  144 <==, 142 <==, 138 <==, 144 <==    potassium   4.2 <==, 4.6 <==, 6.3 <==, 4.1 <==    chloride  106 <==, 107 <==, 104 <==, 106 <==    carbon dioxide  23 <==, 22 <==, 17 <==, 24 <==    =============================================================================================  RENAL FUNCTION:    Creatinine:   0.65  <<==, 0.63  <<==, 0.58  <<==, 0.64  <<==    BUN:   24 <==, 21 <==, 21 <==, 22 <==    ============================================================================================    calcium   9.6 <==, 9.3 <==, 9.2 <==, 9.7 <==    ============================================================================================  LFTs    AST:   29 <==     ALT:  16  <==     AP:  105  <=    Bili:  0.3  <=    PT/INR - ( 20 Jan 2022 08:53 )   PT: 12.0 sec;   INR: 1.00 ratio      PTT - ( 20 Jan 2022 08:53 )  PTT:31.0 sec    Venous Blood Gas:  01-20 @ 08:53  7.34/55/52/30/83.0  VBG Lactate: 1.7    Serum Pro-Brain Natriuretic Peptide: 148 pg/mL (01-20 @ 08:53)    CARDIAC MARKERS ( 20 Jan 2022 08:53 )  CPK x     /CKMB x     /CKMB Units x        troponin 21 ng/L    < from: Transthoracic Echocardiogram (12.21.21 @ 14:39) >    Patient name: FRANKI MEHTA  YOB: 1935   Age: 86 (F)   MR#: [id number]  Study Date: 12/21/2021  Location: [address] [place], [place] [id number]: [facility]  Study quality: Technically difficult/Limited  Referring Physician: Edmond Almazan MD  Blood Pressure: 134/77 mmHg  Height: 163 cm  Weight: 109 kg  BSA: 2.1 m2  Heart Rate: 105 mmHg  ------------------------------------------------------------------------  PROCEDURE: Transthoracic echocardiogram with 2-D, M-Mode  and complete spectral andcolor flow Doppler.  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 No segmental  wall motion abnormalities.  Moderate aortic stenosis.  There may be a vegetation on the left or non-coronary cusp  of the aortic valve. Consider transesophageal  echocardiography.  ------------------------------------------------------------------------  Confirmed on  12/22/2021 - 10:32:40 by Rahul Correa M.D.  ------------------------------------------------------------------------    MICROBIOLOGY:     Respiratory Viral Panel with COVID-19 by RYAN (01.20.22 @ 08:52)   Rapid RVP Result: [facility]   SARS-CoV-2: [facility]    Urinalysis Basic - ( 20 Jan 2022 14:56 )    Color: Yellow / Appearance: Slightly Turbid / SG: >1.050 / pH: x  Gluc: x / Ketone: Negative  / Bili: Negative / Urobili: Negative   Blood: x / Protein: 100 / Nitrite: Negative   Leuk Esterase: Large / RBC: 7 /hpf / WBC 25 /HPF   Sq Epi: x / Non Sq Epi: 5 /hpf / Bacteria: Few    Culture - Urine (01.20.22 @ 16:37)   Culture Results:   >100,000 CFU/ml Enterococcus faecium (vancomycin resistant)   <10,000 CFU/ml Normal Urogenital ck present     Culture - Bronchial (01.06.22 @ 18:48)   Culture Results:   Normal Respiratory Ck present     Culture - Abscess with Gram Stain (12.23.21 @ 18:49)   Specimen Source: .Abscess abdominal abscess   Culture Results:   Few Escherichia coli   Few Morganella morganii   Moderate Enterococcus avium   Numerous Bacteroides thetaiotamcron group "Susceptibilities not performed"   Numerous Clostridium ramosum "Susceptibilities not performed"     Culture - Blood (12.19.21 @ 14:24)   Culture Results:   Growth in aerobic bottle: Staphylococcus epidermidis   Organism Identification: Staphylococcus epidermidis     Culture - Blood (12.19.21 @ 14:24)   Gram Stain:   Growth in aerobic bottle: Gram Positive Cocci in Clusters   Growth in anaerobic bottle: Gram Positive Cocci in Clusters   - Staphylococcus epidermidis, Methicillin resistant: Detec     RADIOLOGY:  [x] Chest radiographs reviewed and interpreted by me    CXR 1/23 "to my eye" -> partial reexpansion of the left upper lobe  ---------------------------------------------------------------------------------------------------------------  < from: Xray Chest 1 View- PORTABLE-Urgent (Xray Chest 1 View- PORTABLE-Urgent .) (01.23.22 @ 16:55) >    EXAM:  XR CHEST PORTABLE URGENT 1V                          PROCEDURE DATE:  01/23/2022      INTERPRETATION:  EXAMINATION: XR CHEST URGENT    CLINICAL INDICATION: PICC placement    TECHNIQUE: Single frontal, portable view of the chest was obtained.    COMPARISON: Chest radiograph 1/21/2022.    FINDINGS:  Left-sided PICC line tip terminates at the left brachiocephalic/SVC   junction.  The heart is enlarged.  Hazy opacification of the left lower lung field which may represent   atelectasis or pleural effusion.. Is not significant change from the   prior study.  No pneumothorax.    IMPRESSION:  Left-sided PICC line tip terminates at the left brachiocephalic/SVC   junction.    MOUSTAPHA COURTNEY MD; Resident Radiology  This document has been electronically signed.  DIANE CLARK MD; Attending Radiologist  This document has been electronically signed. Jan 24 2022  5:24PM  ---------------------------------------------------------------------------------------------------------------  < from: CT Abdomen and Pelvis w/ IV Cont (01.20.22 @ 10:35) >    EXAM:  CT ABDOMEN AND PELVIS IC                        EXAM:  CT ANGIO CHEST PULM [facility]                          PROCEDURE DATE:  01/20/2022      FINDINGS:    CHEST:    PULMONARY ANGIOGRAM: Limited study secondary to extensive respiratory   motion artifact. No evidence of large central pulmonary embolism, with   limited evaluation of lobar, segmental and subsegmental branches. The   pulmonary artery is enlarged measuring 3.3 cm, nonspecific although can   be seen with pulmonary hypertension.    MEDIASTINUM/LYMPH NODES: No mediastinal or hilar lymphadenopathy. Small   hiatal hernia.    AIRWAYS/LUNGS/PLEURA: Degraded by respiratory motion artifact. There is   partial left upper lobe and complete lingular and left lower lobe   collapse with superimposed bronchial impaction. There is a small left   pleural effusion with intrafissural extension. There are patchy   groundglass opacities at the right apex, likely infectious/inflammatory.   No pneumothorax. Mild subpleural reticulation along the right middle lobe   may be secondary to right breast/chest wall radiotherapy.    HEART/VASCULATURE: Heart size is normal. No pericardial effusion. Mild   aortic valvular and mitral annular calcification. A left PICC is present   which has repositioned between the acquisition of the  imaging   (terminating in the SVC on ) and the chest CTA, now crossing midline   into the right innominate vein.    CHEST WALL AND LOWER NECK: The thyroid is incompletely characterized on   this study. Status post right mastectomy.    ABDOMEN AND PELVIS:    LIVER: Within normal limits. Calcified hepatic granuloma.    BILE DUCTS: No intrahepatic biliary ductal dilatation. Redemonstration of   choledocholithiasis in the ampullary region, unchanged since 12/30/2021.    GALLBLADDER: Cholelithiasis.    SPLEEN: Within normal limits.    PANCREAS: 1 cm pancreatic tail cyst.    ADRENALS: Within normal limits.    KIDNEYS/URETERS: Mildly atrophic left kidney. No hydronephrosis.    BLADDER: Within normal limits.    REPRODUCTIVE ORGANS: Uterus present. There is a 1.4 cm right adnexal cyst.    BOWEL: Percutaneous gastrostomy tube present with intraluminal balloon.   No bowel obstruction. Colonic diverticulosis without diverticulitis.   Interval removal of the right lower quadrant pigtail catheter in the   region of the appendix. There is a residual, multilocular 4.0 x 2.2 cm   collection with peripheral enhancement (series 3, image 84) in the region   of previously perforated appendix. Previously seen periappendiceal fat   stranding has largely resolved.    PERITONEUM: No ascites.    VESSELS: Aortic calcifications. Hypodense lesion noted at the origin of   the SMA which was previously seen but not as well appreciated on prior   noncontrast enhanced study on 12/30/2021. SMA however appears patent.    RETROPERITONEUM/LYMPH NODES: No lymphadenopathy.    ABDOMINAL WALL: . Fat-containing umbilical hernia.    BONES: Remote left proximal humeral fracture status post ORIF. Unchanged   T11 and L1 superior endplate compression deformities.    IMPRESSION:  Limited exam for pulmonary embolism; no central pulmonary embolism with   nondiagnostic evaluation of lobar, segmental and subsegmental branches.   No imaging evidence of right heart strain.    Complete lingular and left lower lobe collapse withsuperimposed   bronchial impaction. Small left pleural effusion. Mild patchy right   apical groundglass may be related to pulmonary edema, infection or   inflammation.    Multilocular 4.0 x 2.2 cm right lower quadrant fluid collection in the   regionof previously perforated appendix, status post drain removal.   Periappendiceal fat stranding has largely resolved.    Left PICC crosses midline and terminates in the right innominate vein,   likely repositioned during the administration of contrast between    imaging and CTA acquisition.    Cholelithiasis and choledocholithiasis. No intrahepatic biliary ductal   dilatation.    MARKO GUAMAN DO; Resident Radiologist  This document has been electronically signed.  SHARATH TURCIOS M.D., Attending Radiologist  This document has been electronically signed. Jan 20 2022 12:48PM  ---------------------------------------------------------------------------------------------------------------  < from: CT Head No Cont (01.20.22 @ 10:35) >    EXAM:  CT BRAIN                          PROCEDURE DATE:  01/20/2022      FINDINGS:    Study is limited by motion.    No hydrocephalus, midline shift, or effacement of the basal cisterns. No   gross evidence for acute intracranial hemorrhage or brain edema.    Complete opacification of the right maxillary sinus and right mastoid air   cells.    IMPRESSION:    Limited by motion.    No gross evidence for acute intracranial hemorrhage or brain edema.    No hydrocephalus or midline shift.    Complete opacification of the right maxillary sinus and right mastoid air   cells. Correlate for the presence of sinusitis/mastoiditis.    LESLI KING MD; Attending Radiologist  This document has been electronically signed. Jan 20 2022 10:38AM  ---------------------------------------------------------------------------------------------------------------

## 2022-01-27 NOTE — PROGRESS NOTE ADULT - ASSESSMENT
85 y/o female, hx of CVA, MI, breast CA, COPD, not on home 02, s/p recent  perforated  appendox/ abscess,  HN, HLD, hypothyroid,  obexity, relatively  be d boind, , gout BIBEMS from [facility] due to SOB., started this morning patient initial 02 sat was 84% placed on NRB and improved to 93 with recent perforated appendicitis, possible IE on long term iv abx     John Fragoso  Attending Physician   Division of Infectious Disease  Pager #[phone]  Available on Microsoft Teams also  After 5pm/weekend or no response, call #[phone]

## 2022-01-27 NOTE — PROGRESS NOTE ADULT - ASSESSMENT
ASSESSMENT:    86 year old gentlewoman, lifelong non-smoker, without history of intrinsic lung disease. 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 She also has a history of HTN, HLD, CAD s/p MI with preserved LVEF and moderate aortic stenosis. The patient was admitted to [facility] on December 19th 2021 with fever and abdominal pain. She was found to have perforated appendicitis with abscess formation. She was treated with tube drainage and antibiotics for a polymicrobial infection. She continues on vancomycin/ertapenem via a PICC line. Admission CT had debris within the left lobar and more distal airways of the left lower lobe as well as some debris within the right lower lobe airway - there was complete atelectasis of the left lower lobe and subsegmental atelectatic changes within the left upper lobe and dependent portions of the right lung. The patient developed increased work of breathing, tachycardia, tachypnea and hypoxemia on January 4th due to mucous plugging with complete collapse of the left lung. She underwent bronchoscopy on January 6th with removal of copious amounts of purulent secretions from throughout the bronchial tree - there was severe tracheobronchomalacia. Bronchial cultures were negative. She was treated with bronchodilators, mucolytic nebs, chest vest and nocturnal AVAPS with expansion of the left upper lobe and some reexpansion of the left lower lobe. She was discharged to rehab on January 11th on a 3lpm nasal canula with plans to continue medical and mechanical therapy as well as nocturnal BIPAP. The patient returns from rehab with decreased mental status, dyspnea and hypoxemia treated with 100% NRB. She currently is obtunded on a BIPAP device. CT scan -> partial left upper lobe and complete lingular and left lower lobe collapse with superimposed bronchial impaction - small left pleural effusion with intrafissural extension - patchy groundglass opacities at the right apex. The patient has redeveloped left lung atelectasis due to mucous plugging -> multifactorial. There is an area of inflammation/infection in the right upper lobe  1) tracheobronchomalacia with marked expiratory airway narrowing preventing adequate sputum clearance  2) weak cough following a CVA with marked deconditioning   3) dysphagia with ongoing aspiration of oral secretions  4) moderate aortic stenosis with a small left pleural effusion    PLAN/RECOMMENDATIONS:    oxygen supplementation using a high flow nasal canula -> decrease to 40 FiO2 -> will continue to taper as tolerated  following CXR  bronchoscopy cannot be performed due to the patient's tenuous respiratory status - she would unlikely be able to be "liberated" from a ventilator if intubated  nocturnal AVAPS to facilitate lung expansion  s/p bronchoscopy 1/6 with removal of copious amounts of purulent mucous from throughout the airways - there was severe tracheobronchomalacia -> cultures are negative  vancomycin/ertapenem  albuterol/atrovent nebs q6h  hypertonic saline and mucomyst nebs to facilitate mucous clearance  guaifenesin via PEG  chest vest/manual chest PT  cough assist device  cardiac meds: lopressor/norvasc/lipitor  DVT prophylaxis - SQ heparin  allopurinol/keppra/synthroid  glucose control  abdominal/pelvic CT - percutaneous gastrostomy tube present with intraluminal balloon - colonic diverticulosis without diverticulitis - interval removal of the right lower quadrant pigtail catheter in the region of the appendix - there is a residual, multilocular 4.0 x 2.2 cm collection with peripheral enhancement in the region of the previously perforated appendix - periappendiceal fat stranding has largely resolved - ID/IR/surgery evaluations noted - no intervention is needed    I am not hopeful that the left lung will expand due to the multiple issues delineated above - suspect that she will need to be treated with oxygen supplementation to keep saturation greater than 90% accepting the fact that her left lung will be basically non-functional    Will follow with you. Plan of care discussed with the patient at bedside, with the dedicated floor NP and with Dr. Segundo.    Kennedy Marcano MD, [facility]  [phone]  Pulmonary Medicine

## 2022-01-28 NOTE — PROGRESS NOTE ADULT - SUBJECTIVE AND OBJECTIVE BOX
[facility] - PROGRESS NOTE    CHIEF COMPLAINT: acute hypoxic respiratory failure; mucous plugging; left lung atelectasis; weak cough; dysphagia; tracheobronchomalacia; PEG;     INTERVAL HISTORY: confused - little insight into her medical condition; no shortness of breath or hypoxemia on a 40% high flow nasal canula; wore AVAPS overnight; no cough, sputum production, hemoptysis, chest congestion or wheeze; no fevers, chills or sweats; no chest pain/pressure or palpitations; bedbound    REVIEW OF SYSTEMS:  Constitutional: As per interval history  HEENT: Within normal limits  CV: As per interval history  Resp: As per interval history  GI: dysphagia -> PEG  : Within normal limits  Musculoskeletal: Within normal limits  Skin: Within normal limits  Neurological: CVA - > left sided weakness/plegia  Psychiatric: Within normal limits  Endocrine: Within normal limits  Hematologic/Lymphatic: Within normal limits  Allergic/Immunologic: Within normal limits    MEDICATIONS:     Pulmonary "  acetylcysteine 20%  Inhalation 4 milliLiter(s) Inhalation every 6 hours  albuterol/ipratropium for Nebulization 3 milliLiter(s) Nebulizer every 6 hours  sodium chloride 3%  Inhalation 4 milliLiter(s) Inhalation every 6 hours    Anti-microbials:  ertapenem  IVPB 1000 milliGRAM(s) IV Intermittent every 24 hours  vancomycin  IVPB 750 milliGRAM(s) IV Intermittent every 24 hours    Cardiovascular:  amLODIPine   Tablet 10 milliGRAM(s) Oral daily  metoprolol tartrate 25 milliGRAM(s) Oral two times a day    Other:  allopurinol 100 milliGRAM(s) Oral daily  artificial  tears Solution 1 Drop(s) Both EYES three times a day  atorvastatin 20 milliGRAM(s) Oral at bedtime  chlorhexidine 2% Cloths 1 Application(s) Topical <User Schedule>  heparin   Injectable 5000 Unit(s) SubCutaneous every 8 hours  levETIRAcetam  Solution 750 milliGRAM(s) Oral two times a day  levothyroxine 75 MICROGram(s) Oral daily    MEDICATIONS  (PRN):  acetaminophen    Suspension .. 650 milliGRAM(s) Oral every 6 hours PRN Temp greater or equal to 38C (100.4F), Mild Pain (1 - 3)    OBJECTIVE:    I&O's Detail    27 Jan 2022 07:01  -  28 Jan 2022 07:00  --------------------------------------------------------  IN:    Enteral Tube Flush: 120 mL    Jevity 1.2: 1000 mL  Total IN: 1120 mL    OUT:  Total OUT: 0 mL    Total NET: 1120 mL    PHYSICAL EXAM:       ICU Vital Signs Last 24 Hrs  T(C): 36.4 (28 Jan 2022 04:34), Max: 36.4 (27 Jan 2022 21:20)  T(F): 97.6 (28 Jan 2022 04:34), Max: 97.6 (28 Jan 2022 04:34)  HR: 100 (28 Jan 2022 09:25) (83 - 100)  BP: 117/70 (28 Jan 2022 09:25) (100/57 - 137/75)  BP(mean): --  ABP: --  ABP(mean): --  RR: 19 (28 Jan 2022 09:25) (18 - 20)  SpO2: 95% (28 Jan 2022 09:25) (93% - 96%) on 40% high flow nasal canula     General: Awake. Alert. Confused. Cooperative. No distress Appears stated age.	  HEENT: Atraumatic. Normocephalic. Anicteric. Normal oral mucosa. PERRL. EOMI. High flow nasal canula. Mallampati class IV airway.  Neck: Supple. Trachea midline. Thyroid without enlargement/tenderness/nodules. No carotid bruit. No JVD. Short and wide  Cardiovascular: Regular rate and rhythm. S1 S2 normal. III/VI systolic murmur  Respiratory: Respirations unlabored. Decreased breath sounds left hemithorax. No curvature.  Abdomen: Soft. Non-tender. Non-distended. No organomegaly. No masses. Normal bowel sounds. Obese, PEG.  Extremities: Warm to touch. No clubbing or cyanosis. No pedal edema. LUE PICC line.  Pulses: 2+ peripheral pulses all extremities.	  Skin: Normal skin color. No rashes or lesions. No ecchymoses. No cyanosis. Warm to touch.  Lymph Nodes: Cervical, supraclavicular and axillary nodes normal  Neurological: Left lower extremity plegia with contraction. Left upper extremity is quite weak. A and O x 3  Psychiatry: Calm      LABS:                          9.6    5.94  )-----------( 195      ( 27 Jan 2022 05:56 )             32.6     CBC    WBC  5.94 <==, 6.44 <==, Clotted <==, 6.33 <==    Hemoglobin  9.6 <<==, 9.4 <<==, See note <<==, 9.4 <<==    Hematocrit  32.6 <==, 30.2 <==, See note <==, 31.2 <==    Platelets  195 <==, 192 <==, Clotted <==, 191 <==      144  |  106  |  24<H>  ----------------------------<  122<H>    01-27  4.2   |  23  |  0.65      LYTES    sodium  144 <==, 142 <==, 138 <==, 144 <==    potassium   4.2 <==, 4.6 <==, 6.3 <==, 4.1 <==    chloride  106 <==, 107 <==, 104 <==, 106 <==    carbon dioxide  23 <==, 22 <==, 17 <==, 24 <==    =============================================================================================  RENAL FUNCTION:    Creatinine:   0.65  <<==, 0.63  <<==, 0.58  <<==, 0.64  <<==    BUN:   24 <==, 21 <==, 21 <==, 22 <==    ============================================================================================    calcium   9.6 <==, 9.3 <==, 9.2 <==, 9.7 <==  ============================================================================================  PT/INR - ( 20 Jan 2022 08:53 )   PT: 12.0 sec;   INR: 1.00 ratio      PTT - ( 20 Jan 2022 08:53 )  PTT:31.0 sec    Venous Blood Gas:  01-20 @ 08:53  7.34/55/52/30/83.0  VBG Lactate: 1.7    Serum Pro-Brain Natriuretic Peptide: 148 pg/mL (01-20 @ 08:53)    CARDIAC MARKERS ( 20 Jan 2022 08:53 )  CPK x     /CKMB x     /CKMB Units x        troponin 21 ng/L    < from: Transthoracic Echocardiogram (12.21.21 @ 14:39) >    Patient name: FRANKI MEHTA  YOB: 1935   Age: 86 (F)   MR#: [id number]  Study Date: 12/21/2021  Location: [address] [place], [place] [id number]: Tena Garnett OBED  Study quality: Technically difficult/Limited  Referring Physician: Edmond Almazan MD  Blood Pressure: 134/77 mmHg  Height: 163 cm  Weight: 109 kg  BSA: 2.1 m2  Heart Rate: 105 mmHg  ------------------------------------------------------------------------  PROCEDURE: Transthoracic echocardiogram with 2-D, M-Mode  and complete spectral andcolor flow Doppler.  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 No segmental  wall motion abnormalities.  Moderate aortic stenosis.  There may be a vegetation on the left or non-coronary cusp  of the aortic valve. Consider transesophageal  echocardiography.  ------------------------------------------------------------------------  Confirmed on  12/22/2021 - 10:32:40 by Rahul Correa M.D.  ------------------------------------------------------------------------    MICROBIOLOGY:     Respiratory Viral Panel with COVID-19 by RYAN (01.20.22 @ 08:52)   Rapid RVP Result: [facility]   SARS-CoV-2: NotDete    Urinalysis Basic - ( 20 Jan 2022 14:56 )    Color: Yellow / Appearance: Slightly Turbid / SG: >1.050 / pH: x  Gluc: x / Ketone: Negative  / Bili: Negative / Urobili: Negative   Blood: x / Protein: 100 / Nitrite: Negative   Leuk Esterase: Large / RBC: 7 /hpf / WBC 25 /HPF   Sq Epi: x / Non Sq Epi: 5 /hpf / Bacteria: Few    Culture - Urine (01.20.22 @ 16:37)   Culture Results:   >100,000 CFU/ml Enterococcus faecium (vancomycin resistant)   <10,000 CFU/ml Normal Urogenital ck present     Culture - Bronchial (01.06.22 @ 18:48)   Culture Results:   Normal Respiratory Ck present     Culture - Abscess with Gram Stain (12.23.21 @ 18:49)   Specimen Source: .Abscess abdominal abscess   Culture Results:   Few Escherichia coli   Few Morganella morganii   Moderate Enterococcus avium   Numerous Bacteroides thetaiotamcron group "Susceptibilities not performed"   Numerous Clostridium ramosum "Susceptibilities not performed"     Culture - Blood (12.19.21 @ 14:24)   Culture Results:   Growth in aerobic bottle: Staphylococcus epidermidis   Organism Identification: Staphylococcus epidermidis     Culture - Blood (12.19.21 @ 14:24)   Gram Stain:   Growth in aerobic bottle: Gram Positive Cocci in Clusters   Growth in anaerobic bottle: Gram Positive Cocci in Clusters   - Staphylococcus epidermidis, Methicillin resistant: Detec     RADIOLOGY:  [x] Chest radiographs reviewed and interpreted by me    CXR 1/23 "to my eye" -> partial reexpansion of the left upper lobe  ---------------------------------------------------------------------------------------------------------------  < from: Xray Chest 1 View- PORTABLE-Urgent (Xray Chest 1 View- PORTABLE-Urgent .) (01.23.22 @ 16:55) >    EXAM:  XR CHEST PORTABLE URGENT 1V                          PROCEDURE DATE:  01/23/2022      INTERPRETATION:  EXAMINATION: XR CHEST URGENT    CLINICAL INDICATION: PICC placement    TECHNIQUE: Single frontal, portable view of the chest was obtained.    COMPARISON: Chest radiograph 1/21/2022.    FINDINGS:  Left-sided PICC line tip terminates at the left brachiocephalic/SVC   junction.  The heart is enlarged.  Hazy opacification of the left lower lung field which may represent   atelectasis or pleural effusion.. Is not significant change from the   prior study.  No pneumothorax.    IMPRESSION:  Left-sided PICC line tip terminates at the left brachiocephalic/SVC   junction.    MOUSTAPHA COURTNEY MD; Resident Radiology  This document has been electronically signed.  DIANE CLARK MD; Attending Radiologist  This document has been electronically signed. Jan 24 2022  5:24PM  ---------------------------------------------------------------------------------------------------------------  < from: CT Abdomen and Pelvis w/ IV Cont (01.20.22 @ 10:35) >    EXAM:  CT ABDOMEN AND PELVIS IC                        EXAM:  [facility] CHEST [facility]                          PROCEDURE DATE:  01/20/2022      FINDINGS:    CHEST:    PULMONARY ANGIOGRAM: Limited study secondary to extensive respiratory   motion artifact. No evidence of large central pulmonary embolism, with   limited evaluation of lobar, segmental and subsegmental branches. The   pulmonary artery is enlarged measuring 3.3 cm, nonspecific although can   be seen with pulmonary hypertension.    MEDIASTINUM/LYMPH NODES: No mediastinal or hilar lymphadenopathy. Small   hiatal hernia.    AIRWAYS/LUNGS/PLEURA: Degraded by respiratory motion artifact. There is   partial left upper lobe and complete lingular and left lower lobe   collapse with superimposed bronchial impaction. There is a small left   pleural effusion with intrafissural extension. There are patchy   groundglass opacities at the right apex, likely infectious/inflammatory.   No pneumothorax. Mild subpleural reticulation along the right middle lobe   may be secondary to right breast/chest wall radiotherapy.    HEART/VASCULATURE: Heart size is normal. No pericardial effusion. Mild   aortic valvular and mitral annular calcification. A left PICC is present   which has repositioned between the acquisition of the  imaging   (terminating in the SVC on ) and the chest CTA, now crossing midline   into the right innominate vein.    CHEST WALL AND LOWER NECK: The thyroid is incompletely characterized on   this study. Status post right mastectomy.    ABDOMEN AND PELVIS:    LIVER: Within normal limits. Calcified hepatic granuloma.    BILE DUCTS: No intrahepatic biliary ductal dilatation. Redemonstration of   choledocholithiasis in the ampullary region, unchanged since 12/30/2021.    GALLBLADDER: Cholelithiasis.    SPLEEN: Within normal limits.    PANCREAS: 1 cm pancreatic tail cyst.    ADRENALS: Within normal limits.    KIDNEYS/URETERS: Mildly atrophic left kidney. No hydronephrosis.    BLADDER: Within normal limits.    REPRODUCTIVE ORGANS: Uterus present. There is a 1.4 cm right adnexal cyst.    BOWEL: Percutaneous gastrostomy tube present with intraluminal balloon.   No bowel obstruction. Colonic diverticulosis without diverticulitis.   Interval removal of the right lower quadrant pigtail catheter in the   region of the appendix. There is a residual, multilocular 4.0 x 2.2 cm   collection with peripheral enhancement (series 3, image 84) in the region   of previously perforated appendix. Previously seen periappendiceal fat   stranding has largely resolved.    PERITONEUM: No ascites.    VESSELS: Aortic calcifications. Hypodense lesion noted at the origin of   the SMA which was previously seen but not as well appreciated on prior   noncontrast enhanced study on 12/30/2021. SMA however appears patent.    RETROPERITONEUM/LYMPH NODES: No lymphadenopathy.    ABDOMINAL WALL: . Fat-containing umbilical hernia.    BONES: Remote left proximal humeral fracture status post ORIF. Unchanged   T11 and L1 superior endplate compression deformities.    IMPRESSION:  Limited exam for pulmonary embolism; no central pulmonary embolism with   nondiagnostic evaluation of lobar, segmental and subsegmental branches.   No imaging evidence of right heart strain.    Complete lingular and left lower lobe collapse withsuperimposed   bronchial impaction. Small left pleural effusion. Mild patchy right   apical groundglass may be related to pulmonary edema, infection or   inflammation.    Multilocular 4.0 x 2.2 cm right lower quadrant fluid collection in the   regionof previously perforated appendix, status post drain removal.   Periappendiceal fat stranding has largely resolved.    Left PICC crosses midline and terminates in the right innominate vein,   likely repositioned during the administration of contrast between    imaging and CTA acquisition.    Cholelithiasis and choledocholithiasis. No intrahepatic biliary ductal   dilatation.    MARKO GUAMAN DO; Resident Radiologist  This document has been electronically signed.  SHARATH TURCIOS M.D., Attending Radiologist  This document has been electronically signed. Jan 20 2022 12:48PM  ---------------------------------------------------------------------------------------------------------------  < from: CT Head No Cont (01.20.22 @ 10:35) >    EXAM:  CT BRAIN                          PROCEDURE DATE:  01/20/2022      FINDINGS:    Study is limited by motion.    No hydrocephalus, midline shift, or effacement of the basal cisterns. No   gross evidence for acute intracranial hemorrhage or brain edema.    Complete opacification of the right maxillary sinus and right mastoid air   cells.    IMPRESSION:    Limited by motion.    No gross evidence for acute intracranial hemorrhage or brain edema.    No hydrocephalus or midline shift.    Complete opacification of the right maxillary sinus and right mastoid air   cells. Correlate for the presence of sinusitis/mastoiditis.    LESLI KING MD; Attending Radiologist  This document has been electronically signed. Jan 20 2022 10:38AM  ---------------------------------------------------------------------------------------------------------------

## 2022-01-28 NOTE — PROGRESS NOTE ADULT - ASSESSMENT
86   year old female    h/o hemorrhagic CVA, has  PEG,  HTN, MI,   HLD, gout, right ca  breast   right Ca breast. s/p mastectomy in 2019,  s/p  sentinel node  localization.  4/4,  were  negative  peg dislodged.  IR   replacements  on 1/3/22   picc  and iv ab  for 6 weeks, per  ID, on  last  visit  s/p rrt   for hypoxia. cxr with complete  collapsed  of  left lung, needing broch, by  [facility]   s/ p  bronchoscopy , pt  with  tracheomalacia  and  collapsed  airways,  makes  this a  difficult  situation, as there is no good rx for this          *  p/w   sob. acute  resp failure  on Bipap,  from left   lung  collapse  afberile,  with normal  wbc  on arrival  *   s/p cva, has  PEG,  npo  ,  on  synthroid, Keppra  ,  *  HTN, on meds  per  card  *  h/o ca breast. /, s/p  R  mastectomy  * obesity, bmi  was   41, now  is  34 in  er  left  leg contracture ,  remains  bed  bound. functional  paraplegias  needs  assistance  for  all her  adl's  *   h/o bacteremia. on 12/19/21,  Staph epi, meth R,  from perforated  appendicitis  ct  c/a/p, from last  visit   pna, perforated  appendicitis,  ?  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 this will not alter rx. pt  will need   extended  course  of ab     on iv  vanco and ertapenem.  till  2/9/.22  ID dr reagan, and per  surg  and  IR  eval,  no intervnetion  CT  1/20/22, no PE. collection in right side   on Proventil,  Mucomyst and   saline  nebs.   * pt  with  h/o  tracheomalacia  and  collapsed  airways,  makes  this a  difficult  situation, as there is no good rx for this  given pt;s  mlple co morbidities,  pt is  at risk for  re admissions  picc  line/  on nocturnal  awaps  on hi flow, remains hypoxic  on  hi flow,  supportive  care  perhaps  pt is  an rcu candidate/  on oxygen   picc  line  f/p by picc  nurse  difficult  situation, a s there  is no  good  rx  for  pt's  recurrent lung collapse hypoxia  remaains hypoxic/ pulm to  f/p       per  son, pt is  full code     rad< from: CT Angio Chest PE Protocol w/ IV Cont (01.20.22 @ 10:35) >  IMPRESSION:  Limited exam for pulmonary embolism; no central pulmonary embolism with   nondiagnostic evaluation of lobar, segmental and subsegmental branches.   No imaging evidence of right heart strain.  Complete lingular and left lower lobe collapse withsuperimposed   bronchial impaction. Small left pleural effusion. Mild patchy right   apical groundglass may be related to pulmonary edema, infection or   inflammation.  Multilocular 4.0 x 2.2 cm right lower quadrant fluid collection in the   regionof previously perforated appendix, status post drain removal.   Periappendiceal fat stranding has largely resolved.  Left PICC crosses midline and terminates in the right innominate vein,   likely repositioned during the administration of contrast between    imaging and CTA acquisition.  Cholelithiasis and choledocholithiasis. No intrahepatic biliary ductal   dilatation.  --- End of Report ---  < end of copied text >                    86   year old female    h/o hemorrhagic CVA, has  PEG,  HTN, MI,   HLD, gout, right ca  breast   right Ca breast. s/p mastectomy in 2019,  s/p  sentinel node  localization.  4/4,  were  negative  peg dislodged.  IR   replacements  on 1/3/22   picc  and iv ab  for 6 weeks, per  ID, on  last  visit  s/p rrt   for hypoxia. cxr with complete  collapsed  of  left lung, needing broch, by  pul[facility]   s/ p  bronchoscopy , pt  with  tracheomalacia  and  collapsed  airways,  makes  this a  difficult  situation, as there is no good rx for this          *  p/w   sob. acute  resp failure  on Bipap,  from left   lung  collapse  afberile,  with normal  wbc  on arrival  *   s/p cva, has  PEG,  npo  ,  on  synthroid, Keppra  ,  *  HTN, on meds  per  card  *  h/o ca breast. /, s/p  R  mastectomy  * obesity, bmi  was   41, now  is  36 in  er  left  leg contracture ,  remains  bed  bound. functional  paraplegias  needs  assistance  for  all her  adl's  *   h/o bacteremia. on 12/19/21,  Staph epi, meth R,  from perforated  appendicitis  ct  c/a/p, from last  visit   pna, perforated  appendicitis,  ?  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 this will not alter rx. pt  will need   extended  course  of ab     on iv  vanco and ertapenem.  till  2/9/.22  ID dr reagan, and per  surg  and  IR  eval,  no intervnetion  CT  1/20/22, no PE. collection in right side   on Proventil,  Mucomyst and   saline  nebs.   * pt  with  h/o  tracheomalacia  and  collapsed  airways,  makes  this a  difficult  situation, as there is no good rx for this  given pt;s  mlple co morbidities,  pt is  at risk for  re admissions  picc  line/  on nocturnal  awaps  on hi flow, remains hypoxic  on  hi flow,  supportive  care  perhaps  pt is  an rcu candidate/  on oxygen  difficult  situation, a s there  is no  good  rx  for  pt's  recurrent lung collapse hypoxia  remaains hypoxic/ pulm to  f/p       per  son, pt is  full code     rad< from: CT Angio Chest PE Protocol w/ IV Cont (01.20.22 @ 10:35) >  IMPRESSION:  Limited exam for pulmonary embolism; no central pulmonary embolism with   nondiagnostic evaluation of lobar, segmental and subsegmental branches.   No imaging evidence of right heart strain.  Complete lingular and left lower lobe collapse withsuperimposed   bronchial impaction. Small left pleural effusion. Mild patchy right   apical groundglass may be related to pulmonary edema, infection or   inflammation.  Multilocular 4.0 x 2.2 cm right lower quadrant fluid collection in the   regionof previously perforated appendix, status post drain removal.   Periappendiceal fat stranding has largely resolved.  Left PICC crosses midline and terminates in the right innominate vein,   likely repositioned during the administration of contrast between    imaging and CTA acquisition.  Cholelithiasis and choledocholithiasis. No intrahepatic biliary ductal   dilatation.  --- End of Report ---  < end of copied text >

## 2022-01-28 NOTE — CHART NOTE - NSCHARTNOTEFT_GEN_A_CORE
Chest xray reviewed with Dr. Gonzalez. Tip of PICC in svc/brachiocephalic junction which is considered central by anatomic classification. Okay to use for vancomycin and other medications.  Also, the risk of transporting and positioning the pt on high flow outweighs the benefit of picc exchange.   d/w Joshua METCALF

## 2022-01-28 NOTE — PROGRESS NOTE ADULT - ASSESSMENT
ASSESSMENT:    86 year old gentlewoman, lifelong non-smoker, without history of intrinsic lung disease. 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 She also has a history of HTN, HLD, CAD s/p MI with preserved LVEF and moderate aortic stenosis. The patient was admitted to [place] on December 19th 2021 with fever and abdominal pain. She was found to have perforated appendicitis with abscess formation. She was treated with tube drainage and antibiotics for a polymicrobial infection. She continues on vancomycin/ertapenem via a PICC line. Admission CT had debris within the left lobar and more distal airways of the left lower lobe as well as some debris within the right lower lobe airway - there was complete atelectasis of the left lower lobe and subsegmental atelectatic changes within the left upper lobe and dependent portions of the right lung. The patient developed increased work of breathing, tachycardia, tachypnea and hypoxemia on January 4th due to mucous plugging with complete collapse of the left lung. She underwent bronchoscopy on January 6th with removal of copious amounts of purulent secretions from throughout the bronchial tree - there was severe tracheobronchomalacia. Bronchial cultures were negative. She was treated with bronchodilators, mucolytic nebs, chest vest and nocturnal AVAPS with expansion of the left upper lobe and some reexpansion of the left lower lobe. She was discharged to rehab on January 11th on a 3lpm nasal canula with plans to continue medical and mechanical therapy as well as nocturnal BIPAP. The patient returns from rehab with decreased mental status, dyspnea and hypoxemia treated with 100% NRB. She currently is obtunded on a BIPAP device. CT scan -> partial left upper lobe and complete lingular and left lower lobe collapse with superimposed bronchial impaction - small left pleural effusion with intrafissural extension - patchy groundglass opacities at the right apex. The patient has redeveloped left lung atelectasis due to mucous plugging -> multifactorial. There is an area of inflammation/infection in the right upper lobe  1) tracheobronchomalacia with marked expiratory airway narrowing preventing adequate sputum clearance  2) weak cough following a CVA with marked deconditioning   3) dysphagia with ongoing aspiration of oral secretions  4) moderate aortic stenosis with a small left pleural effusion    PLAN/RECOMMENDATIONS:    trial on a traditional humidified nasal canula - restart high flow nasal canula if oxygen saturation does blow 90%  following CXR  bronchoscopy cannot be performed due to the patient's tenuous respiratory status - she would unlikely be able to be "liberated" from a ventilator if intubated  nocturnal AVAPS to facilitate lung expansion  s/p bronchoscopy 1/6 with removal of copious amounts of purulent mucous from throughout the airways - there was severe tracheobronchomalacia -> cultures are negative  vancomycin/ertapenem  albuterol/atrovent nebs q6h  hypertonic saline and mucomyst nebs to facilitate mucous clearance  guaifenesin via PEG  chest vest/manual chest PT  cough assist device  cardiac meds: lopressor/norvasc/lipitor  DVT prophylaxis - SQ heparin  allopurinol/keppra/synthroid  glucose control  abdominal/pelvic CT - percutaneous gastrostomy tube present with intraluminal balloon - colonic diverticulosis without diverticulitis - interval removal of the right lower quadrant pigtail catheter in the region of the appendix - there is a residual, multilocular 4.0 x 2.2 cm collection with peripheral enhancement in the region of the previously perforated appendix - periappendiceal fat stranding has largely resolved - ID/IR/surgery evaluations noted - no intervention is needed    I am not hopeful that the left lung will expand due to the multiple issues delineated above - suspect that she will need to be treated with oxygen supplementation to keep saturation greater than 90% accepting the fact that her left lung will be basically non-functional    Will follow with you. Plan of care discussed with the patient at bedside, with the dedicated floor NP and with Dr. Segundo.    Kennedy Marcano MD, [facility]  [phone]  Pulmonary Medicine

## 2022-01-28 NOTE — PROGRESS NOTE ADULT - ASSESSMENT
85 y/o female, hx of CVA, MI, breast CA, COPD, not on home 02, s/p recent  perforated  appendox/ abscess,  HN, HLD, hypothyroid,  obexity, relatively  be d boind, , gout BIBEMS from [facility] due to SOB., started this morning patient initial 02 sat was 84% placed on NRB and improved to 93 with recent perforated appendicitis, possible IE on long term iv abx     John Fragoso  Attending Physician   Division of Infectious Disease  Pager #[phone]  Available on [employer] Teams also  After 5pm/weekend or no response, call #[phone]

## 2022-01-28 NOTE — PROGRESS NOTE ADULT - SUBJECTIVE AND OBJECTIVE BOX
FRANKI MEHTA 86y [id number]    Patient is a 86y old  Female who presents with a chief complaint of sob (28 Jan 2022 07:02)      Follow Up/CC:  ID following for bacteremia    Interval History/ROS: getting chest PT, no fever    Allergies    Toradol (Urticaria (Mild to Mod); Rash (Mild to Mod))    Intolerances        ANTIMICROBIALS:  ertapenem  IVPB 1000 every 24 hours  vancomycin  IVPB 750 every 24 hours      MEDICATIONS  (STANDING):  acetylcysteine 20%  Inhalation 4 milliLiter(s) Inhalation every 6 hours  albuterol/ipratropium for Nebulization 3 milliLiter(s) Nebulizer every 6 hours  allopurinol 100 milliGRAM(s) Oral daily  amLODIPine   Tablet 10 milliGRAM(s) Oral daily  artificial  tears Solution 1 Drop(s) Both EYES three times a day  atorvastatin 20 milliGRAM(s) Oral at bedtime  chlorhexidine 2% Cloths 1 Application(s) Topical <User Schedule>  ertapenem  IVPB 1000 milliGRAM(s) IV Intermittent every 24 hours  guaiFENesin Oral Liquid (Sugar-Free) 300 milliGRAM(s) Oral every 6 hours  heparin   Injectable 5000 Unit(s) SubCutaneous every 8 hours  levETIRAcetam  Solution 750 milliGRAM(s) Oral two times a day  levothyroxine 75 MICROGram(s) Oral daily  metoprolol tartrate 25 milliGRAM(s) Oral two times a day  sodium chloride 3%  Inhalation 4 milliLiter(s) Inhalation every 6 hours  vancomycin  IVPB 750 milliGRAM(s) IV Intermittent every 24 hours    MEDICATIONS  (PRN):  acetaminophen    Suspension .. 650 milliGRAM(s) Oral every 6 hours PRN Temp greater or equal to 38C (100.4F), Mild Pain (1 - 3)        Vital Signs Last 24 Hrs  T(C): 37 (28 Jan 2022 13:01), Max: 37 (28 Jan 2022 13:01)  T(F): 98.6 (28 Jan 2022 13:01), Max: 98.6 (28 Jan 2022 13:01)  HR: 89 (28 Jan 2022 13:01) (83 - 100)  BP: 129/74 (28 Jan 2022 13:01) (100/57 - 137/75)  BP(mean): --  RR: 19 (28 Jan 2022 13:01) (18 - 20)  SpO2: 95% (28 Jan 2022 15:00) (93% - 96%)    CBC Full  -  ( 27 Jan 2022 05:56 )  WBC Count : 5.94 K/uL  RBC Count : 3.01 M/uL  Hemoglobin : 9.6 g/dL  Hematocrit : 32.6 %  Platelet Count - Automated : 195 K/uL  Mean Cell Volume : 108.3 fl  Mean Cell Hemoglobin : 31.9 pg  Mean Cell Hemoglobin Concentration : 29.4 gm/dL  Auto Neutrophil # : x  Auto Lymphocyte # : x  Auto Monocyte # : x  Auto Eosinophil # : x  Auto Basophil # : x  Auto Neutrophil % : x  Auto Lymphocyte % : x  Auto Monocyte % : x  Auto Eosinophil % : x  Auto Basophil % : x    01-27    144  |  106  |  24<H>  ----------------------------<  122<H>  4.2   |  23  |  0.65    Ca    9.6      27 Jan 2022 05:56            MICROBIOLOGY:  Clean Catch Clean Catch (Midstream)  01-20-22   >100,000 CFU/ml Enterococcus faecium (vancomycin resistant)  <10,000 CFU/ml Normal Urogenital ck present  --  Enterococcus faecium (vancomycin resistant)      .Blood Blood-Peripheral  01-20-22   No Growth Final  --  --      BAL Bronchoalveolar Lavage  01-06-22   No growth at 1 week.  --    No polymorphonuclear cells seen per low power field  No squamous epithelial cells per low power field  No organisms seen per oil power field              Vancomycin Level, Trough: 20.9 ug/mL (01-28-22 @ 07:13)  Vancomycin Level, Trough: 17.3 ug/mL (01-27-22 @ 19:11)  v            RADIOLOGY

## 2022-01-28 NOTE — PROGRESS NOTE ADULT - SUBJECTIVE AND OBJECTIVE BOX
afberile/  hypoxic    REVIEW OF SYSTEMS:  GEN: no fever,    no chills  RESP: no SOB,   no cough  CVS: no chest pain,   no palpitations  GI: no abdominal pain,   no nausea,   no vomiting,   no constipation,   no diarrhea  : no dysuria,   no frequency  NEURO: no headache,   no dizziness  PSYCH: no depression,   not anxious  Derm : no rash    MEDICATIONS  (STANDING):  acetylcysteine 20%  Inhalation 4 milliLiter(s) Inhalation every 6 hours  albuterol/ipratropium for Nebulization 3 milliLiter(s) Nebulizer every 6 hours  allopurinol 100 milliGRAM(s) Oral daily  amLODIPine   Tablet 10 milliGRAM(s) Oral daily  artificial  tears Solution 1 Drop(s) Both EYES three times a day  atorvastatin 20 milliGRAM(s) Oral at bedtime  chlorhexidine 2% Cloths 1 Application(s) Topical <User Schedule>  ertapenem  IVPB 1000 milliGRAM(s) IV Intermittent every 24 hours  heparin   Injectable 5000 Unit(s) SubCutaneous every 8 hours  levETIRAcetam  Solution 750 milliGRAM(s) Oral two times a day  levothyroxine 75 MICROGram(s) Oral daily  metoprolol tartrate 25 milliGRAM(s) Oral two times a day  sodium chloride 3%  Inhalation 4 milliLiter(s) Inhalation every 6 hours  vancomycin  IVPB 750 milliGRAM(s) IV Intermittent every 24 hours    MEDICATIONS  (PRN):  acetaminophen    Suspension .. 650 milliGRAM(s) Oral every 6 hours PRN Temp greater or equal to 38C (100.4F), Mild Pain (1 - 3)      Vital Signs Last 24 Hrs  T(C): 36.4 (28 Jan 2022 04:34), Max: 36.5 (27 Jan 2022 12:18)  T(F): 97.6 (28 Jan 2022 04:34), Max: 97.7 (27 Jan 2022 12:18)  HR: 84 (28 Jan 2022 04:34) (83 - 97)  BP: 100/57 (28 Jan 2022 04:34) (100/57 - 137/75)  BP(mean): --  RR: 18 (28 Jan 2022 04:34) (18 - 20)  SpO2: 93% (28 Jan 2022 04:34) (91% - 96%)  CAPILLARY BLOOD GLUCOSE        I&O's Summary    27 Jan 2022 07:01  -  28 Jan 2022 07:00  --------------------------------------------------------  IN: 500 mL / OUT: 0 mL / NET: 500 mL        PHYSICAL EXAM:  HEAD:  Atraumatic, Normocephalic  NECK: Supple, No   JVD  CHEST/LUNG:   no     rales,     no,    rhonchi  HEART: Regular rate and rhythm;         murmur  ABDOMEN: Soft, Nontender, ;   EXTREMITIES:    no    edema  NEUROLOGY:  alert    LABS:                        9.6    5.94  )-----------( 195      ( 27 Jan 2022 05:56 )             32.6     01-27    144  |  106  |  24<H>  ----------------------------<  122<H>  4.2   |  23  |  0.65    Ca    9.6      27 Jan 2022 05:56                      Thyroid Stimulating Hormone, Serum: 2.39 uIU/mL (01-07 @ 09:13)          Consultant(s) Notes Reviewed:      Care Discussed with Consultants/Other Providers:

## 2022-01-29 NOTE — PROGRESS NOTE ADULT - SUBJECTIVE AND OBJECTIVE BOX
afberile  REVIEW OF SYSTEMS:  GEN: no fever,    no chills  RESP: no SOB,   no cough  CVS: no chest pain,   no palpitations  GI: no abdominal pain,   no nausea,   no vomiting,   no constipation,   no diarrhea  : no dysuria,   no frequency  NEURO: no headache,   no dizziness  PSYCH: no depression,   not anxious  Derm : no rash    MEDICATIONS  (STANDING):  acetylcysteine 20%  Inhalation 4 milliLiter(s) Inhalation every 6 hours  albuterol/ipratropium for Nebulization 3 milliLiter(s) Nebulizer every 6 hours  allopurinol 100 milliGRAM(s) Oral daily  amLODIPine   Tablet 10 milliGRAM(s) Oral daily  artificial  tears Solution 1 Drop(s) Both EYES three times a day  atorvastatin 20 milliGRAM(s) Oral at bedtime  chlorhexidine 2% Cloths 1 Application(s) Topical <User Schedule>  ertapenem  IVPB 1000 milliGRAM(s) IV Intermittent every 24 hours  guaiFENesin Oral Liquid (Sugar-Free) 300 milliGRAM(s) Oral every 6 hours  heparin   Injectable 5000 Unit(s) SubCutaneous every 8 hours  levETIRAcetam  Solution 750 milliGRAM(s) Oral two times a day  levothyroxine 75 MICROGram(s) Oral daily  metoprolol tartrate 25 milliGRAM(s) Oral two times a day  sodium chloride 3%  Inhalation 4 milliLiter(s) Inhalation every 6 hours    MEDICATIONS  (PRN):  acetaminophen    Suspension .. 650 milliGRAM(s) Oral every 6 hours PRN Temp greater or equal to 38C (100.4F), Mild Pain (1 - 3)      Vital Signs Last 24 Hrs  T(C): 36.7 (29 Jan 2022 05:34), Max: 36.7 (28 Jan 2022 21:19)  T(F): 98.1 (29 Jan 2022 05:34), Max: 98.1 (28 Jan 2022 21:19)  HR: 91 (29 Jan 2022 10:18) (91 - 99)  BP: 122/90 (29 Jan 2022 07:35) (108/50 - 122/90)  BP(mean): --  RR: 20 (29 Jan 2022 05:34) (20 - 20)  SpO2: 95% (29 Jan 2022 10:18) (95% - 98%)  CAPILLARY BLOOD GLUCOSE        I&O's Summary    28 Jan 2022 07:01  -  29 Jan 2022 07:00  --------------------------------------------------------  IN: 500 mL / OUT: 850 mL / NET: -350 mL    29 Jan 2022 07:01  -  29 Jan 2022 13:58  --------------------------------------------------------  IN: 0 mL / OUT: 0 mL / NET: 0 mL        PHYSICAL EXAM:  HEAD:  Atraumatic, Normocephalic  NECK: Supple, No   JVD  CHEST/LUNG:   no     rales,     no,    rhonchi  HEART: Regular rate and rhythm;         murmur  ABDOMEN: Soft, Nontender, ;   EXTREMITIES:   no     edema  NEUROLOGY:  alert    LABS:                        9.1    4.93  )-----------( 193      ( 29 Jan 2022 07:06 )             31.0     01-29    145  |  107  |  23  ----------------------------<  132<H>  4.5   |  24  |  0.68    Ca    9.6      29 Jan 2022 07:06    TPro  6.9  /  Alb  3.5  /  TBili  0.3  /  DBili  x   /  AST  16  /  ALT  13  /  AlkPhos  110  01-29                    Thyroid Stimulating Hormone, Serum: 2.39 uIU/mL (01-07 @ 09:13)          Consultant(s) Notes Reviewed:      Care Discussed with Consultants/Other Providers:

## 2022-01-29 NOTE — PROGRESS NOTE ADULT - ASSESSMENT
87 y/o female, hx of CVA, MI, breast CA, COPD, not on home 02, s/p recent  perforated  appendox/ abscess,  HN, HLD, hypothyroid,  obexity, relatively  be d boind, , gout BIBEMS from [facility] due to SOB., started this morning patient initial 02 sat was 84% placed on NRB and improved to 93 with recent perforated appendicitis, possible IE on long term iv abx     John Fragoso  Attending Physician   Division of Infectious Disease  Pager #[phone]  Available on [employer] Teams also  After 5pm/weekend or no response, call #[phone]

## 2022-01-29 NOTE — PROGRESS NOTE ADULT - ASSESSMENT
86   year old female    h/o hemorrhagic CVA, has  PEG,  HTN, MI,   HLD, gout, right ca  breast   right Ca breast. s/p mastectomy in 2019,  s/p  sentinel node  localization.  4/4,  were  negative  peg dislodged.  IR   replacements  on 1/3/22   picc  and iv ab  for 6 weeks, per  ID, on  last  visit  s/p rrt   for hypoxia. cxr with complete  collapsed  of  left lung, needing broch, by  pulm magnus mir   s/ p  bronchoscopy , pt  with  tracheomalacia  and  collapsed  airways,  makes  this a  difficult  situation, as there is no good rx for this          *  p/w   sob. acute  resp failure  on Bipap,  from left   lung  collapse  afberile,  with normal  wbc  on arrival  *   s/p cva, has  PEG,  npo  ,  on  synthroid, Keppra  ,  *  HTN, on meds  per  card  *  h/o ca breast. /, s/p  R  mastectomy  * obesity, bmi  was   41, now  is  34 in  er  left  leg contracture ,  remains  bed  bound. functional  paraplegias  needs  assistance  for  all her  adl's  *   h/o bacteremia. on 12/19/21,  Staph epi, meth R,  from perforated  appendicitis  ct  c/a/p, from last  visit   pna, perforated  appendicitis,  ?  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 this will not alter rx. pt  will need   extended  course  of ab     on iv  vanco and ertapenem.  till  2/9/.22  ID dr reagan, and per  surg  and  IR  eval,  no intervnetion  CT  1/20/22, no PE. collection in right side   on Proventil,  Mucomyst and   saline  nebs.   * pt  with  h/o  tracheomalacia  and  collapsed  airways,  makes  this a  difficult  situation, as there is no good rx for this  given pt;s  mlple co morbidities,  pt is  at risk for  re admissions  picc  line/  on nocturnal  awaps  on hi flow, remains hypoxic  on  hi flow,  supportive  care  perhaps  pt is  an rcu candidate/  on oxygen  difficult  situation, a s there  is no  good  rx  for  pt's  recurrent lung collapse hypoxia  remains  hypoxic/ pulm to  f/p  deescalate   oxygen as tolertaed       per  son, pt is  full code     rad< from: CT Angio Chest PE Protocol w/ IV Cont (01.20.22 @ 10:35) >  IMPRESSION:  Limited exam for pulmonary embolism; no central pulmonary embolism with   nondiagnostic evaluation of lobar, segmental and subsegmental branches.   No imaging evidence of right heart strain.  Complete lingular and left lower lobe collapse withsuperimposed   bronchial impaction. Small left pleural effusion. Mild patchy right   apical groundglass may be related to pulmonary edema, infection or   inflammation.  Multilocular 4.0 x 2.2 cm right lower quadrant fluid collection in the   regionof previously perforated appendix, status post drain removal.   Periappendiceal fat stranding has largely resolved.  Left PICC crosses midline and terminates in the right innominate vein,   likely repositioned during the administration of contrast between    imaging and CTA acquisition.  Cholelithiasis and choledocholithiasis. No intrahepatic biliary ductal   dilatation.  --- End of Report ---  < end of copied text >

## 2022-01-29 NOTE — PROGRESS NOTE ADULT - SUBJECTIVE AND OBJECTIVE BOX
FRANKI MEHTA 86y [id number]    Patient is a 86y old  Female who presents with a chief complaint of sob (28 Jan 2022 15:13)      Follow Up/CC:  ID following for bactermeia     Interval History/ROS: no fever, no acute issues, "I'm thirsty"    Allergies    Toradol (Urticaria (Mild to Mod); Rash (Mild to Mod))    Intolerances        ANTIMICROBIALS:  ertapenem  IVPB 1000 every 24 hours      MEDICATIONS  (STANDING):  acetylcysteine 20%  Inhalation 4 milliLiter(s) Inhalation every 6 hours  albuterol/ipratropium for Nebulization 3 milliLiter(s) Nebulizer every 6 hours  allopurinol 100 milliGRAM(s) Oral daily  amLODIPine   Tablet 10 milliGRAM(s) Oral daily  artificial  tears Solution 1 Drop(s) Both EYES three times a day  atorvastatin 20 milliGRAM(s) Oral at bedtime  chlorhexidine 2% Cloths 1 Application(s) Topical <User Schedule>  ertapenem  IVPB 1000 milliGRAM(s) IV Intermittent every 24 hours  guaiFENesin Oral Liquid (Sugar-Free) 300 milliGRAM(s) Oral every 6 hours  heparin   Injectable 5000 Unit(s) SubCutaneous every 8 hours  levETIRAcetam  Solution 750 milliGRAM(s) Oral two times a day  levothyroxine 75 MICROGram(s) Oral daily  metoprolol tartrate 25 milliGRAM(s) Oral two times a day  sodium chloride 3%  Inhalation 4 milliLiter(s) Inhalation every 6 hours    MEDICATIONS  (PRN):  acetaminophen    Suspension .. 650 milliGRAM(s) Oral every 6 hours PRN Temp greater or equal to 38C (100.4F), Mild Pain (1 - 3)        Vital Signs Last 24 Hrs  T(C): 36.7 (29 Jan 2022 05:34), Max: 37 (28 Jan 2022 13:01)  T(F): 98.1 (29 Jan 2022 05:34), Max: 98.6 (28 Jan 2022 13:01)  HR: 95 (29 Jan 2022 05:54) (86 - 100)  BP: 108/50 (28 Jan 2022 21:19) (108/50 - 129/74)  BP(mean): --  RR: 20 (29 Jan 2022 05:34) (19 - 20)  SpO2: 97% (29 Jan 2022 05:54) (93% - 98%)        MICROBIOLOGY:  Clean Catch Clean Catch (Midstream)  01-20-22   >100,000 CFU/ml Enterococcus faecium (vancomycin resistant)  <10,000 CFU/ml Normal Urogenital ck present  --  Enterococcus faecium (vancomycin resistant)      .Blood Blood-Peripheral  01-20-22   No Growth Final  --  --      BAL Bronchoalveolar Lavage  01-06-22   No growth at 1 week.  --    No polymorphonuclear cells seen per low power field  No squamous epithelial cells per low power field  No organisms seen per oil power field        Vancomycin Level, Trough: 20.9 ug/mL (01-28-22 @ 07:13)    RADIOLOGY    < from: Xray Chest 1 View- PORTABLE-Urgent (Xray Chest 1 View- PORTABLE-Urgent .) (01.28.22 @ 14:04) >  Left lower lung consolidation and volume loss is unchanged.    < end of copied text >

## 2022-01-30 NOTE — PROGRESS NOTE ADULT - ASSESSMENT
86   year old female    h/o hemorrhagic CVA, has  PEG,  HTN, MI,   HLD, gout, right ca  breast   right Ca breast. s/p mastectomy in 2019,  s/p  sentinel node  localization.  4/4,  were  negative  peg dislodged.  IR   replacements  on 1/3/22   picc  and iv ab  for 6 weeks, per  ID, on  last  visit  s/p rrt   for hypoxia. cxr with complete  collapsed  of  left lung, needing broch, by  pulm magnus mir   s/ p  bronchoscopy , pt  with  tracheomalacia  and  collapsed  airways,  makes  this a  difficult  situation, as there is no good rx for this          *  p/w   sob. acute  resp failure  on Bipap,  from left   lung  collapse  afberile,  with normal  wbc  on arrival  *   s/p cva, has  PEG,  npo  ,  on  synthroid, Keppra  ,  *  HTN, on meds  per  card  *  h/o ca breast. /, s/p  R  mastectomy  * obesity, bmi  was   41, now  is  36 in  er  left  leg contracture ,  remains  bed  bound. functional  paraplegias  needs  assistance  for  all her  adl's  *   h/o bacteremia. on 12/19/21,  Staph epi, meth R,  from perforated  appendicitis  ct  c/a/p, from last  visit   pna, perforated  appendicitis,  ?  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 this will not alter rx. pt  will need   extended  course  of ab     on iv  vanco and ertapenem.  till  2/9/.22  ID dr reagan, and per  surg  and  IR  eval,  no intervnetion  CT  1/20/22, no PE. collection in right side   on Proventil,  Mucomyst and   saline  nebs.   * pt  with  h/o  tracheomalacia  and  collapsed  airways,  makes  this a  difficult  situation, as there is no good rx for this  given pt;s  mlple co morbidities,  pt is  at risk for  re admissions  picc  line/  on nocturnal  awaps  on hi flow, remains hypoxic  on  hi flow,  supportive  care  perhaps  pt is  an rcu candidate/  on oxygen  difficult  situation, a s there  is no  good  rx  for  pt's  recurrent lung collapse hypoxia  remaains hypoxic/ pulm to  f/p  on n/ canula oxygen/  when her O2  needs  are less, then can start  d/c  planning to rehab       per  son, pt is  full code     rad< from: CT Angio Chest PE Protocol w/ IV Cont (01.20.22 @ 10:35) >  IMPRESSION:  Limited exam for pulmonary embolism; no central pulmonary embolism with   nondiagnostic evaluation of lobar, segmental and subsegmental branches.   No imaging evidence of right heart strain.  Complete lingular and left lower lobe collapse withsuperimposed   bronchial impaction. Small left pleural effusion. Mild patchy right   apical groundglass may be related to pulmonary edema, infection or   inflammation.  Multilocular 4.0 x 2.2 cm right lower quadrant fluid collection in the   regionof previously perforated appendix, status post drain removal.   Periappendiceal fat stranding has largely resolved.  Left PICC crosses midline and terminates in the right innominate vein,   likely repositioned during the administration of contrast between    imaging and CTA acquisition.  Cholelithiasis and choledocholithiasis. No intrahepatic biliary ductal   dilatation.  --- End of Report ---  < end of copied text >

## 2022-01-30 NOTE — PROGRESS NOTE ADULT - SUBJECTIVE AND OBJECTIVE BOX
afberile    REVIEW OF SYSTEMS:  GEN: no fever,    no chills  RESP: no SOB,   no cough  CVS: no chest pain,   no palpitations  GI: no abdominal pain,   no nausea,   no vomiting,   no constipation,   no diarrhea  : no dysuria,   no frequency  NEURO: no headache,   no dizziness  PSYCH: no depression,   not anxious  Derm : no rash    MEDICATIONS  (STANDING):  acetylcysteine 20%  Inhalation 4 milliLiter(s) Inhalation every 6 hours  albuterol/ipratropium for Nebulization 3 milliLiter(s) Nebulizer every 6 hours  allopurinol 100 milliGRAM(s) Oral daily  amLODIPine   Tablet 10 milliGRAM(s) Oral daily  artificial  tears Solution 1 Drop(s) Both EYES three times a day  atorvastatin 20 milliGRAM(s) Oral at bedtime  chlorhexidine 2% Cloths 1 Application(s) Topical <User Schedule>  ertapenem  IVPB 1000 milliGRAM(s) IV Intermittent every 24 hours  heparin   Injectable 5000 Unit(s) SubCutaneous every 8 hours  levETIRAcetam  Solution 750 milliGRAM(s) Oral two times a day  levothyroxine 75 MICROGram(s) Oral daily  metoprolol tartrate 25 milliGRAM(s) Oral two times a day  sodium chloride 3%  Inhalation 4 milliLiter(s) Inhalation every 6 hours  vancomycin  IVPB 750 milliGRAM(s) IV Intermittent every 24 hours    MEDICATIONS  (PRN):  acetaminophen    Suspension .. 650 milliGRAM(s) Oral every 6 hours PRN Temp greater or equal to 38C (100.4F), Mild Pain (1 - 3)      Vital Signs Last 24 Hrs  T(C): 36.6 (29 Jan 2022 20:10), Max: 36.6 (29 Jan 2022 20:10)  T(F): 97.9 (29 Jan 2022 20:10), Max: 97.9 (29 Jan 2022 20:10)  HR: 93 (30 Jan 2022 04:07) (90 - 94)  BP: 97/68 (29 Jan 2022 20:10) (97/68 - 122/90)  BP(mean): --  RR: 20 (29 Jan 2022 20:10) (20 - 20)  SpO2: 95% (30 Jan 2022 04:07) (92% - 95%)  CAPILLARY BLOOD GLUCOSE        I&O's Summary    29 Jan 2022 07:01  -  30 Jan 2022 07:00  --------------------------------------------------------  IN: 0 mL / OUT: 650 mL / NET: -650 mL        PHYSICAL EXAM:  HEAD:  Atraumatic, Normocephalic  NECK: Supple, No   JVD  CHEST/LUNG:   no     rales,     no,    rhonchi  HEART: Regular rate and rhythm;         murmur  ABDOMEN: Soft, Nontender, ;   EXTREMITIES:    no    edema  NEUROLOGY:  alert    LABS:                        9.1    4.93  )-----------( 193      ( 29 Jan 2022 07:06 )             31.0     01-29    145  |  107  |  23  ----------------------------<  132<H>  4.5   |  24  |  0.68    Ca    9.6      29 Jan 2022 07:06    TPro  6.9  /  Alb  3.5  /  TBili  0.3  /  DBili  x   /  AST  16  /  ALT  13  /  AlkPhos  110  01-29                    Thyroid Stimulating Hormone, Serum: 2.39 uIU/mL (01-07 @ 09:13)          Consultant(s) Notes Reviewed:      Care Discussed with Consultants/Other Providers:

## 2022-01-31 NOTE — PROGRESS NOTE ADULT - SUBJECTIVE AND OBJECTIVE BOX
CARDIOLOGY     PROGRESS  NOTE   ________________________________________________    CHIEF COMPLAINT:Patient is a 86y old  Female who presents with a chief complaint of sob (31 Jan 2022 07:11)  doing better.  	  REVIEW OF SYSTEMS:  CONSTITUTIONAL: No fever, weight loss, or fatigue  EYES: No eye pain, visual disturbances, or discharge  ENT:  No difficulty hearing, tinnitus, vertigo; No sinus or throat pain  NECK: No pain or stiffness  RESPIRATORY: No cough, wheezing, chills or hemoptysis; No Shortness of Breath  CARDIOVASCULAR: No chest pain, palpitations, passing out, dizziness, or leg swelling  GASTROINTESTINAL: No abdominal or epigastric pain. No nausea, vomiting, or hematemesis; No diarrhea or constipation. No melena or hematochezia.  GENITOURINARY: No dysuria, frequency, hematuria, or incontinence  NEUROLOGICAL: No headaches, memory loss, loss of strength, numbness, or tremors  SKIN: No itching, burning, rashes, or lesions   LYMPH Nodes: No enlarged glands  ENDOCRINE: No heat or cold intolerance; No hair loss  MUSCULOSKELETAL: No joint pain or swelling; No muscle, back, or extremity pain  PSYCHIATRIC: No depression, anxiety, mood swings, or difficulty sleeping  HEME/LYMPH: No easy bruising, or bleeding gums  ALLERGY AND IMMUNOLOGIC: No hives or eczema	    [ ] All others negative	  [ ] Unable to obtain    PHYSICAL EXAM:  T(C): 36.7 (01-31-22 @ 11:45), Max: 37 (01-31-22 @ 05:05)  HR: 86 (01-31-22 @ 17:35) (83 - 98)  BP: 111/70 (01-31-22 @ 17:35) (100/60 - 126/77)  RR: 19 (01-31-22 @ 11:45) (18 - 19)  SpO2: 96% (01-31-22 @ 11:45) (93% - 98%)  Wt(kg): --  I&O's Summary    30 Jan 2022 07:01  -  31 Jan 2022 07:00  --------------------------------------------------------  IN: 0 mL / OUT: 850 mL / NET: -850 mL    31 Jan 2022 07:01  -  31 Jan 2022 18:34  --------------------------------------------------------  IN: 0 mL / OUT: 450 mL / NET: -450 mL        Appearance: Normal	  HEENT:   Normal oral mucosa, PERRL, EOMI	  Lymphatic: No lymphadenopathy  Cardiovascular: Normal S1 S2, No JVD, + murmurs, No edema  Respiratory: Rhonchi  Psychiatry: A & O x 3, Mood & affect appropriate  Gastrointestinal:  Soft, Non-tender, + BS	  Skin: No rashes, No ecchymoses, No cyanosis	  Neurologic: Non-focal  Extremities: Normal range of motion, No clubbing, cyanosis or edema  Vascular: Peripheral pulses palpable 2+ bilaterally    MEDICATIONS  (STANDING):  acetylcysteine 20%  Inhalation 4 milliLiter(s) Inhalation three times a day  albuterol/ipratropium for Nebulization 3 milliLiter(s) Nebulizer every 6 hours  allopurinol 100 milliGRAM(s) Oral daily  artificial  tears Solution 1 Drop(s) Both EYES three times a day  atorvastatin 20 milliGRAM(s) Oral at bedtime  chlorhexidine 2% Cloths 1 Application(s) Topical <User Schedule>  ertapenem  IVPB 1000 milliGRAM(s) IV Intermittent every 24 hours  guaiFENesin Oral Liquid (Sugar-Free) 300 milliGRAM(s) Oral every 6 hours  heparin   Injectable 5000 Unit(s) SubCutaneous every 8 hours  levETIRAcetam  Solution 750 milliGRAM(s) Oral two times a day  levothyroxine 75 MICROGram(s) Oral daily  metoprolol tartrate 25 milliGRAM(s) Oral two times a day  sodium chloride 3%  Inhalation 4 milliLiter(s) Inhalation every 6 hours  vancomycin  IVPB 750 milliGRAM(s) IV Intermittent every 24 hours      TELEMETRY: 	    ECG:  	  RADIOLOGY:  OTHER: 	  	  LABS:	 	    CARDIAC MARKERS:      1) tracheobronchomalacia with marked expiratory airway narrowing preventing adequate sputum clearance  2) weak cough following a CVA with marked deconditioning   3) dysphagia with ongoing aspiration of oral secretions  4) moderate aortic stenosis with a small left pleural effusion        proBNP: Serum Pro-Brain Natriuretic Peptide: 148 pg/mL (01-20 @ 08:53)    Lipid Profile:   HgA1c:   TSH: Thyroid Stimulating Hormone, Serum: 2.39 uIU/mL (01-07 @ 09:13)          Assessment and plan  ---------------------------  85 y/o female, hx of CVA, MI, breast CA, COPD, not on home 02, s/p  recent  perforated  appendix / abscess,  HN, HLD, hypothyroid,  obesity relatively  bed bound , gout   BIBEMS from [facility] due to SOB., started this morning   patient initial 02 sat was 84% placed on NRB and improved to 93%.  has a pic line to left arm ,  and  peg tube  pt had a long course of admission sec to mucus plug and collapsed lung, s/p bronchoscopy.  ct angio of the chest noted, sig abnormality with collapse of the lung  small pleural effusion, doubt chf  agree with iv abx  dvt prophylaxis  pulmonary noted  ecg noted with old inferior wall mi, no acute changes  continue current meds  continue current bp meds  echo noted  will increase beta blocker as tolerated  remained in NSR   sob sec to underlying lung disease  dvt prophylaxis  continue abx, fu blood cultures  pulmonary noted  repeat blood work

## 2022-01-31 NOTE — PROGRESS NOTE ADULT - SUBJECTIVE AND OBJECTIVE BOX
afberile    REVIEW OF SYSTEMS:  GEN: no fever,    no chills  RESP: no SOB,   no cough  CVS: no chest pain,   no palpitations  GI: no abdominal pain,   no nausea,   no vomiting,   no constipation,   no diarrhea  : no dysuria,   no frequency  NEURO: no headache,   no dizziness  PSYCH: no depression,   not anxious  Derm : no rash    MEDICATIONS  (STANDING):  acetylcysteine 20%  Inhalation 4 milliLiter(s) Inhalation every 6 hours  albuterol/ipratropium for Nebulization 3 milliLiter(s) Nebulizer every 6 hours  allopurinol 100 milliGRAM(s) Oral daily  aluminum hydroxide/magnesium hydroxide/simethicone Suspension 30 milliLiter(s) Enteral Tube once  artificial  tears Solution 1 Drop(s) Both EYES three times a day  atorvastatin 20 milliGRAM(s) Oral at bedtime  chlorhexidine 2% Cloths 1 Application(s) Topical <User Schedule>  ertapenem  IVPB 1000 milliGRAM(s) IV Intermittent every 24 hours  heparin   Injectable 5000 Unit(s) SubCutaneous every 8 hours  levETIRAcetam  Solution 750 milliGRAM(s) Oral two times a day  levothyroxine 75 MICROGram(s) Oral daily  metoprolol tartrate 25 milliGRAM(s) Oral two times a day  sodium chloride 3%  Inhalation 4 milliLiter(s) Inhalation every 6 hours  vancomycin  IVPB 750 milliGRAM(s) IV Intermittent every 24 hours    MEDICATIONS  (PRN):  acetaminophen    Suspension .. 650 milliGRAM(s) Oral every 6 hours PRN Temp greater or equal to 38C (100.4F), Mild Pain (1 - 3)      Vital Signs Last 24 Hrs  T(C): 37 (31 Jan 2022 05:05), Max: 37 (31 Jan 2022 05:05)  T(F): 98.6 (31 Jan 2022 05:05), Max: 98.6 (31 Jan 2022 05:05)  HR: 88 (31 Jan 2022 06:01) (77 - 98)  BP: 126/77 (31 Jan 2022 05:05) (92/40 - 132/73)  BP(mean): --  RR: 18 (31 Jan 2022 05:05) (17 - 18)  SpO2: 95% (31 Jan 2022 06:01) (92% - 98%)  CAPILLARY BLOOD GLUCOSE        I&O's Summary    30 Jan 2022 07:01  -  31 Jan 2022 07:00  --------------------------------------------------------  IN: 0 mL / OUT: 850 mL / NET: -850 mL        PHYSICAL EXAM:  HEAD:  Atraumatic, Normocephalic  NECK: Supple, No   JVD  CHEST/LUNG:   no     rales,     no,    rhonchi  HEART: Regular rate and rhythm;         murmur  ABDOMEN: Soft, Nontender, ;   EXTREMITIES:    no    edema  NEUROLOGY:  alert    LABS:                          Thyroid Stimulating Hormone, Serum: 2.39 uIU/mL (01-07 @ 09:13)          Consultant(s) Notes Reviewed:      Care Discussed with Consultants/Other Providers:

## 2022-01-31 NOTE — PROGRESS NOTE ADULT - ASSESSMENT
ASSESSMENT:    86 year old gentlewoman, lifelong non-smoker, without history of intrinsic lung disease. 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 She also has a history of HTN, HLD, CAD s/p MI with preserved LVEF and moderate aortic stenosis. The patient was admitted to [place] on December 19th 2021 with fever and abdominal pain. She was found to have perforated appendicitis with abscess formation. She was treated with tube drainage and antibiotics for a polymicrobial infection. She continues on vancomycin/ertapenem via a PICC line. Admission CT had debris within the left lobar and more distal airways of the left lower lobe as well as some debris within the right lower lobe airway - there was complete atelectasis of the left lower lobe and subsegmental atelectatic changes within the left upper lobe and dependent portions of the right lung. The patient developed increased work of breathing, tachycardia, tachypnea and hypoxemia on January 4th due to mucous plugging with complete collapse of the left lung. She underwent bronchoscopy on January 6th with removal of copious amounts of purulent secretions from throughout the bronchial tree - there was severe tracheobronchomalacia. Bronchial cultures were negative. She was treated with bronchodilators, mucolytic nebs, chest vest and nocturnal AVAPS with expansion of the left upper lobe and some reexpansion of the left lower lobe. She was discharged to rehab on January 11th on a 3lpm nasal canula with plans to continue medical and mechanical therapy as well as nocturnal BIPAP. The patient returns from rehab with decreased mental status, dyspnea and hypoxemia treated with 100% NRB. She currently is obtunded on a BIPAP device. CT scan -> partial left upper lobe and complete lingular and left lower lobe collapse with superimposed bronchial impaction - small left pleural effusion with intrafissural extension - patchy groundglass opacities at the right apex. The patient has redeveloped left lung atelectasis due to mucous plugging -> multifactorial. There is an area of inflammation/infection in the right upper lobe  1) tracheobronchomalacia with marked expiratory airway narrowing preventing adequate sputum clearance  2) weak cough following a CVA with marked deconditioning   3) dysphagia with ongoing aspiration of oral secretions  4) moderate aortic stenosis with a small left pleural effusion    PLAN/RECOMMENDATIONS:    stable oxygenation a 5lpm nasal canula  chest CT today  bronchoscopy cannot be performed due to the patient's tenuous respiratory status - she would unlikely be able to be "liberated" from a ventilator if intubated  nocturnal AVAPS to facilitate lung expansion  s/p bronchoscopy 1/6 with removal of copious amounts of purulent mucous from throughout the airways - there was severe tracheobronchomalacia -> cultures are negative  vancomycin/ertapenem  albuterol/atrovent nebs q6h  hypertonic saline and mucomyst nebs to facilitate mucous clearance  guaifenesin via PEG  chest vest/manual chest PT  cough assist device  cardiac meds: lopressor/lipitor  DVT prophylaxis - SQ heparin  allopurinol/keppra/synthroid  glucose control  abdominal/pelvic CT - percutaneous gastrostomy tube present with intraluminal balloon - colonic diverticulosis without diverticulitis - interval removal of the right lower quadrant pigtail catheter in the region of the appendix - there is a residual, multilocular 4.0 x 2.2 cm collection with peripheral enhancement in the region of the previously perforated appendix - periappendiceal fat stranding has largely resolved - ID/IR/surgery evaluations noted - no intervention is needed    I am not hopeful that the left lung will expand due to the multiple issues delineated above - suspect that she will need to be treated with oxygen supplementation to keep saturation greater than 90% accepting the fact that her left lung will be basically non-functional    Will follow with you. Plan of care discussed with the patient at bedside, with the dedicated floor NP and with Dr. Segundo.    Kennedy Marcano MD, [facility]  [phone]  Pulmonary Medicine

## 2022-01-31 NOTE — PROGRESS NOTE ADULT - ASSESSMENT
86   year old female    h/o hemorrhagic CVA, has  PEG,  HTN, MI,   HLD, gout, right ca  breast   right Ca breast. s/p mastectomy in 2019,  s/p  sentinel node  localization.  4/4,  were  negative  peg dislodged.  IR   replacements  on 1/3/22   picc  and iv ab  for 6 weeks, per  ID, on  last  visit  s/p rrt   for hypoxia. cxr with complete  collapsed  of  left lung, needing broch, by  pulm [facility]   s/ p  bronchoscopy , pt  with  tracheomalacia  and  collapsed  airways,  makes  this a  difficult  situation, as there is no good rx for this          *  p/w   sob. acute  resp failure  on Bipap,  from left   lung  collapse  afberile,  with normal  wbc  on arrival  *   s/p cva, has  PEG,  npo  ,  on  synthroid, Keppra  ,  *  HTN, on meds  per  card  *  h/o ca breast. /, s/p  R  mastectomy  * obesity, bmi  was   41, now  is  34 in  er  left  leg contracture ,  remains  bed  bound. functional  paraplegias  needs  assistance  for  all her  adl's  *   h/o bacteremia. on 12/19/21,  Staph epi, meth R,  from perforated  appendicitis  ct  c/a/p, from last  visit   pna, perforated  appendicitis,  ?  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 this will not alter rx. pt  will need   extended  course  of ab     on iv  vanco and ertapenem.  till  2/9/.22  ID dr reagan, and per  surg  and  IR  eval,  no intervnetion  CT  1/20/22, no PE. collection in right side   on Proventil,  Mucomyst and   saline  nebs.   * pt  with  h/o  tracheomalacia  and  collapsed  airways,  makes  this a  difficult  situation, as there is no good rx for this  given pt;s  mlple co morbidities,  pt is  at risk for  re admissions  picc  line/  on nocturnal  awaps  difficult  situation, a s there  is no  good  rx  for  pt's  recurrent lung collapse hypoxia  remaains hypoxic/ pulm to  f/p  on n/ canula oxygen/  when her O2  needs  are less, then can start  d/c  planning to rehab  on 5  L/  norvasc stopped/ on lopressor       per  son, pt is  full code     rad< from: CT Angio Chest PE Protocol w/ IV Cont (01.20.22 @ 10:35) >  IMPRESSION:  Limited exam for pulmonary embolism; no central pulmonary embolism with   nondiagnostic evaluation of lobar, segmental and subsegmental branches.   No imaging evidence of right heart strain.  Complete lingular and left lower lobe collapse withsuperimposed   bronchial impaction. Small left pleural effusion. Mild patchy right   apical groundglass may be related to pulmonary edema, infection or   inflammation.  Multilocular 4.0 x 2.2 cm right lower quadrant fluid collection in the   regionof previously perforated appendix, status post drain removal.   Periappendiceal fat stranding has largely resolved.  Left PICC crosses midline and terminates in the right innominate vein,   likely repositioned during the administration of contrast between    imaging and CTA acquisition.  Cholelithiasis and choledocholithiasis. No intrahepatic biliary ductal   dilatation.  --- End of Report ---  < end of copied text >

## 2022-01-31 NOTE — PROGRESS NOTE ADULT - SUBJECTIVE AND OBJECTIVE BOX
[facility] - PROGRESS NOTE    CHIEF COMPLAINT: acute hypoxic respiratory failure; mucous plugging; left lung atelectasis; weak cough; dysphagia; tracheobronchomalacia; PEG;     INTERVAL HISTORY: awake, alert and confused - little insight into her medical condition; no shortness of breath or hypoxemia on a 5lpm nasal canula; wore AVAPS overnight; cough with difficulty expectorating sputum; no hemoptysis, chest congestion or wheeze; no fevers, chills or sweats; no chest pain/pressure or palpitations; bedbound    REVIEW OF SYSTEMS:  Constitutional: As per interval history  HEENT: Within normal limits  CV: As per interval history  Resp: As per interval history  GI: dysphagia -> PEG  : Within normal limits  Musculoskeletal: Within normal limits  Skin: Within normal limits  Neurological: CVA - > left sided weakness/plegia  Psychiatric: Within normal limits  Endocrine: Within normal limits  Hematologic/Lymphatic: Within normal limits  Allergic/Immunologic: Within normal limits    MEDICATIONS:     Pulmonary "  acetylcysteine 20%  Inhalation 4 milliLiter(s) Inhalation every 6 hours  albuterol/ipratropium for Nebulization 3 milliLiter(s) Nebulizer every 6 hours  sodium chloride 3%  Inhalation 4 milliLiter(s) Inhalation every 6 hours    Anti-microbials:  ertapenem  IVPB 1000 milliGRAM(s) IV Intermittent every 24 hours  vancomycin  IVPB 750 milliGRAM(s) IV Intermittent every 24 hours    Cardiovascular:  metoprolol tartrate 25 milliGRAM(s) Oral two times a day    Other:  allopurinol 100 milliGRAM(s) Oral daily  artificial  tears Solution 1 Drop(s) Both EYES three times a day  atorvastatin 20 milliGRAM(s) Oral at bedtime  chlorhexidine 2% Cloths 1 Application(s) Topical <User Schedule>  heparin   Injectable 5000 Unit(s) SubCutaneous every 8 hours  levETIRAcetam  Solution 750 milliGRAM(s) Oral two times a day  levothyroxine 75 MICROGram(s) Oral daily    MEDICATIONS  (PRN):  acetaminophen    Suspension .. 650 milliGRAM(s) Oral every 6 hours PRN Temp greater or equal to 38C (100.4F), Mild Pain (1 - 3)    OBJECTIVE:    I&O's Detail    30 Jan 2022 07:01  -  31 Jan 2022 07:00  --------------------------------------------------------  IN:  Total IN: 0 mL    OUT:    Oral Fluid: 0 mL    Voided (mL): 850 mL  Total OUT: 850 mL    Total NET: -850 mL    PHYSICAL EXAM:       ICU Vital Signs Last 24 Hrs  T(C): 37 (31 Jan 2022 05:05), Max: 37 (31 Jan 2022 05:05)  T(F): 98.6 (31 Jan 2022 05:05), Max: 98.6 (31 Jan 2022 05:05)  HR: 88 (31 Jan 2022 06:01) (77 - 98)  BP: 126/77 (31 Jan 2022 05:05) (92/40 - 126/77)  BP(mean): --  ABP: --  ABP(mean): --  RR: 18 (31 Jan 2022 05:05) (18 - 18)  SpO2: 95% (31 Jan 2022 06:01) (92% - 98%) on 5lpm nasal canula     General: Awake. Alert. Confused. Cooperative. No distress Appears stated age.	  HEENT: Atraumatic. Normocephalic. Anicteric. Normal oral mucosa. PERRL. EOMI. High flow nasal canula. Mallampati class IV airway.  Neck: Supple. Trachea midline. Thyroid without enlargement/tenderness/nodules. No carotid bruit. No JVD. Short and wide  Cardiovascular: Regular rate and rhythm. S1 S2 normal. III/VI systolic murmur  Respiratory: Respirations unlabored. Decreased breath sounds left hemithorax. No curvature.  Abdomen: Soft. Non-tender. Non-distended. No organomegaly. No masses. Normal bowel sounds. Obese, PEG.  Extremities: Warm to touch. No clubbing or cyanosis. No pedal edema. LUE PICC line.  Pulses: 2+ peripheral pulses all extremities.	  Skin: Normal skin color. No rashes or lesions. No ecchymoses. No cyanosis. Warm to touch.  Lymph Nodes: Cervical, supraclavicular and axillary nodes normal  Neurological: Left lower extremity plegia with contraction. Left upper extremity is quite weak. A and O x 3  Psychiatry: Calm    LABS:      CBC    WBC  4.93 <==, 5.94 <==, 6.44 <==, Clotted <==    Hemoglobin  9.1 <<==, 9.6 <<==, 9.4 <<==, See note <<==    Hematocrit  31.0 <==, 32.6 <==, 30.2 <==, See note <==    Platelets  193 <==, 195 <==, 192 <==, Clotted <==      145  |  107  |  23  ----------------------------<  132<H>    01-29  4.5   |  24  |  0.68      LYTES    sodium  145 <==, 144 <==, 142 <==, 138 <==    potassium   4.5 <==, 4.2 <==, 4.6 <==, 6.3 <==    chloride  107 <==, 106 <==, 107 <==, 104 <==    carbon dioxide  24 <==, 23 <==, 22 <==, 17 <==    =============================================================================================  RENAL FUNCTION:    Creatinine:   0.68  <<==, 0.65  <<==, 0.63  <<==, 0.58  <<==    BUN:   23 <==, 24 <==, 21 <==, 21 <==    ============================================================================================    calcium   9.6 <==, 9.6 <==, 9.3 <==, 9.2 <==    ============================================================================================  LFTs    AST:   16 <==     ALT:  13  <==     AP:  110  <=    Bili:  0.3  <=  ==========================================================================================  Venous Blood Gas:  01-20 @ 08:53  7.34/55/52/30/83.0  VBG Lactate: 1.7    Serum Pro-Brain Natriuretic Peptide: 148 pg/mL (01-20 @ 08:53)    CARDIAC MARKERS ( 20 Jan 2022 08:53 )  CPK x     /CKMB x     /CKMB Units x        troponin 21 ng/L    < from: Transthoracic Echocardiogram (12.21.21 @ 14:39) >    Patient name: FRANKI MEHTA  YOB: 1935   Age: 86 (F)   MR#: [id number]  Study Date: 12/21/2021  Location: [address] [place], [place] [id number]: [facility]  Study quality: Technically difficult/Limited  Referring Physician: Edmond Almazan MD  Blood Pressure: 134/77 mmHg  Height: 163 cm  Weight: 109 kg  BSA: 2.1 m2  Heart Rate: 105 mmHg  ------------------------------------------------------------------------  PROCEDURE: Transthoracic echocardiogram with 2-D, M-Mode  and complete spectral andcolor flow Doppler.  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 No segmental  wall motion abnormalities.  Moderate aortic stenosis.  There may be a vegetation on the left or non-coronary cusp  of the aortic valve. Consider transesophageal  echocardiography.  ------------------------------------------------------------------------  Confirmed on  12/22/2021 - 10:32:40 by Rahul Correa M.D.  ------------------------------------------------------------------------    MICROBIOLOGY:     Respiratory Viral Panel with COVID-19 by RYAN (01.20.22 @ 08:52)   Rapid RVP Result: [facility]   SARS-CoV-2: NotDete    Urinalysis Basic - ( 20 Jan 2022 14:56 )    Color: Yellow / Appearance: Slightly Turbid / SG: >1.050 / pH: x  Gluc: x / Ketone: Negative  / Bili: Negative / Urobili: Negative   Blood: x / Protein: 100 / Nitrite: Negative   Leuk Esterase: Large / RBC: 7 /hpf / WBC 25 /HPF   Sq Epi: x / Non Sq Epi: 5 /hpf / Bacteria: Few    Culture - Urine (01.20.22 @ 16:37)   Culture Results:   >100,000 CFU/ml Enterococcus faecium (vancomycin resistant)   <10,000 CFU/ml Normal Urogenital ck present     Culture - Bronchial (01.06.22 @ 18:48)   Culture Results:   Normal Respiratory Ck present     Culture - Abscess with Gram Stain (12.23.21 @ 18:49)   Specimen Source: .Abscess abdominal abscess   Culture Results:   Few Escherichia coli   Few Morganella morganii   Moderate Enterococcus avium   Numerous Bacteroides thetaiotamcron group "Susceptibilities not performed"   Numerous Clostridium ramosum "Susceptibilities not performed"     Culture - Blood (12.19.21 @ 14:24)   Culture Results:   Growth in aerobic bottle: Staphylococcus epidermidis   Organism Identification: Staphylococcus epidermidis     Culture - Blood (12.19.21 @ 14:24)   Gram Stain:   Growth in aerobic bottle: Gram Positive Cocci in Clusters   Growth in anaerobic bottle: Gram Positive Cocci in Clusters   - Staphylococcus epidermidis, Methicillin resistant: Detec     RADIOLOGY:  [x] Chest radiographs reviewed and interpreted by me     EXAM:  XR CHEST PORTABLE URGENT 1V                          PROCEDURE DATE:  01/28/2022      INTERPRETATION:  EXAMINATION: XR CHEST URGENT    CLINICAL INDICATION: Mucus plugging, left lung atelectasis.    TECHNIQUE: Single frontal, portable view of the chest was obtained.    COMPARISON: Chest radiograph 1/27/2022.    FINDINGS:    Left PICC tip within SVC. Status post ORIF of the left humerus.  Cannot accurately assess cardiac size in this projection.  Left lower lung consolidation and volume loss is unchanged.  Left pleural effusion. No pneumothorax.    IMPRESSION:  Left lower lung consolidation and volume loss is unchanged.    MOUSTAPHA COURTNEY MD; Resident Radiology  This document has been electronically signed.  GIANNI MANTILLA MD; Attending Radiologist  This document has been electronically signed. Jan 28 2022  6:32PM  ---------------------------------------------------------------------------------------------------------------  < from: Xray Chest 1 View- PORTABLE-Urgent (Xray Chest 1 View- PORTABLE-Urgent .) (01.23.22 @ 16:55) >    EXAM:  XR CHEST PORTABLE URGENT 1V                          PROCEDURE DATE:  01/23/2022      INTERPRETATION:  EXAMINATION: XR CHEST URGENT    CLINICAL INDICATION: PICC placement    TECHNIQUE: Single frontal, portable view of the chest was obtained.    COMPARISON: Chest radiograph 1/21/2022.    FINDINGS:  Left-sided PICC line tip terminates at the left brachiocephalic/SVC   junction.  The heart is enlarged.  Hazy opacification of the left lower lung field which may represent   atelectasis or pleural effusion.. Is not significant change from the   prior study.  No pneumothorax.    IMPRESSION:  Left-sided PICC line tip terminates at the left brachiocephalic/SVC   junction.    MOUSTAPHA COURTNEY MD; Resident Radiology  This document has been electronically signed.  DIANE CLARK MD; Attending Radiologist  This document has been electronically signed. Jan 24 2022  5:24PM  ---------------------------------------------------------------------------------------------------------------  < from: CT Abdomen and Pelvis w/ IV Cont (01.20.22 @ 10:35) >    EXAM:  CT ABDOMEN AND PELVIS IC                        EXAM:  CT ANGIO CHEST PULM ART [facility]                          PROCEDURE DATE:  01/20/2022      FINDINGS:    CHEST:    PULMONARY ANGIOGRAM: Limited study secondary to extensive respiratory   motion artifact. No evidence of large central pulmonary embolism, with   limited evaluation of lobar, segmental and subsegmental branches. The   pulmonary artery is enlarged measuring 3.3 cm, nonspecific although can   be seen with pulmonary hypertension.    MEDIASTINUM/LYMPH NODES: No mediastinal or hilar lymphadenopathy. Small   hiatal hernia.    AIRWAYS/LUNGS/PLEURA: Degraded by respiratory motion artifact. There is   partial left upper lobe and complete lingular and left lower lobe   collapse with superimposed bronchial impaction. There is a small left   pleural effusion with intrafissural extension. There are patchy   groundglass opacities at the right apex, likely infectious/inflammatory.   No pneumothorax. Mild subpleural reticulation along the right middle lobe   may be secondary to right breast/chest wall radiotherapy.    HEART/VASCULATURE: Heart size is normal. No pericardial effusion. Mild   aortic valvular and mitral annular calcification. A left PICC is present   which has repositioned between the acquisition of the  imaging   (terminating in the SVC on ) and the chest CTA, now crossing midline   into the right innominate vein.    CHEST WALL AND LOWER NECK: The thyroid is incompletely characterized on   this study. Status post right mastectomy.    ABDOMEN AND PELVIS:    LIVER: Within normal limits. Calcified hepatic granuloma.    BILE DUCTS: No intrahepatic biliary ductal dilatation. Redemonstration of   choledocholithiasis in the ampullary region, unchanged since 12/30/2021.    GALLBLADDER: Cholelithiasis.    SPLEEN: Within normal limits.    PANCREAS: 1 cm pancreatic tail cyst.    ADRENALS: Within normal limits.    KIDNEYS/URETERS: Mildly atrophic left kidney. No hydronephrosis.    BLADDER: Within normal limits.    REPRODUCTIVE ORGANS: Uterus present. There is a 1.4 cm right adnexal cyst.    BOWEL: Percutaneous gastrostomy tube present with intraluminal balloon.   No bowel obstruction. Colonic diverticulosis without diverticulitis.   Interval removal of the right lower quadrant pigtail catheter in the   region of the appendix. There is a residual, multilocular 4.0 x 2.2 cm   collection with peripheral enhancement (series 3, image 84) in the region   of previously perforated appendix. Previously seen periappendiceal fat   stranding has largely resolved.    PERITONEUM: No ascites.    VESSELS: Aortic calcifications. Hypodense lesion noted at the origin of   the SMA which was previously seen but not as well appreciated on prior   noncontrast enhanced study on 12/30/2021. SMA however appears patent.    RETROPERITONEUM/LYMPH NODES: No lymphadenopathy.    ABDOMINAL WALL: . Fat-containing umbilical hernia.    BONES: Remote left proximal humeral fracture status post ORIF. Unchanged   T11 and L1 superior endplate compression deformities.    IMPRESSION:  Limited exam for pulmonary embolism; no central pulmonary embolism with   nondiagnostic evaluation of lobar, segmental and subsegmental branches.   No imaging evidence of right heart strain.    Complete lingular and left lower lobe collapse withsuperimposed   bronchial impaction. Small left pleural effusion. Mild patchy right   apical groundglass may be related to pulmonary edema, infection or   inflammation.    Multilocular 4.0 x 2.2 cm right lower quadrant fluid collection in the   regionof previously perforated appendix, status post drain removal.   Periappendiceal fat stranding has largely resolved.    Left PICC crosses midline and terminates in the right innominate vein,   likely repositioned during the administration of contrast between    imaging and CTA acquisition.    Cholelithiasis and choledocholithiasis. No intrahepatic biliary ductal   dilatation.    MARKO GUAMAN DO; Resident Radiologist  This document has been electronically signed.  SHARATH TURCIOS M.D., Attending Radiologist  This document has been electronically signed. Jan 20 2022 12:48PM  ---------------------------------------------------------------------------------------------------------------  < from: CT Head No Cont (01.20.22 @ 10:35) >    EXAM:  CT BRAIN                          PROCEDURE DATE:  01/20/2022      FINDINGS:    Study is limited by motion.    No hydrocephalus, midline shift, or effacement of the basal cisterns. No   gross evidence for acute intracranial hemorrhage or brain edema.    Complete opacification of the right maxillary sinus and right mastoid air   cells.    IMPRESSION:    Limited by motion.    No gross evidence for acute intracranial hemorrhage or brain edema.    No hydrocephalus or midline shift.    Complete opacification of the right maxillary sinus and right mastoid air   cells. Correlate for the presence of sinusitis/mastoiditis.    LESLI KING MD; Attending Radiologist  This document has been electronically signed. Jan 20 2022 10:38AM  ---------------------------------------------------------------------------------------------------------------

## 2022-02-01 NOTE — PROGRESS NOTE ADULT - SUBJECTIVE AND OBJECTIVE BOX
FRANKI MEHTA 86y [id number]    Patient is a 86y old  Female who presents with a chief complaint of sob (01 Feb 2022 09:23)      Follow Up/CC:  ID following for bacteremia    Interval History/ROS: no fever    Allergies    Toradol (Urticaria (Mild to Mod); Rash (Mild to Mod))    Intolerances        ANTIMICROBIALS:  ertapenem  IVPB 1000 every 24 hours  vancomycin  IVPB 750 every 24 hours      MEDICATIONS  (STANDING):  acetylcysteine 20%  Inhalation 4 milliLiter(s) Inhalation three times a day  albuterol/ipratropium for Nebulization 3 milliLiter(s) Nebulizer every 6 hours  allopurinol 100 milliGRAM(s) Oral daily  artificial  tears Solution 1 Drop(s) Both EYES three times a day  atorvastatin 20 milliGRAM(s) Oral at bedtime  chlorhexidine 2% Cloths 1 Application(s) Topical <User Schedule>  ertapenem  IVPB 1000 milliGRAM(s) IV Intermittent every 24 hours  guaiFENesin Oral Liquid (Sugar-Free) 300 milliGRAM(s) Oral every 6 hours  heparin   Injectable 5000 Unit(s) SubCutaneous every 8 hours  levETIRAcetam  Solution 750 milliGRAM(s) Oral two times a day  levothyroxine 75 MICROGram(s) Oral daily  metoprolol tartrate 25 milliGRAM(s) Oral two times a day  nystatin Powder 1 Application(s) Topical two times a day  sodium chloride 3%  Inhalation 4 milliLiter(s) Inhalation every 6 hours  vancomycin  IVPB 750 milliGRAM(s) IV Intermittent every 24 hours    MEDICATIONS  (PRN):  acetaminophen    Suspension .. 650 milliGRAM(s) Oral every 6 hours PRN Temp greater or equal to 38C (100.4F), Mild Pain (1 - 3)        Vital Signs Last 24 Hrs  T(C): 36.6 (01 Feb 2022 04:38), Max: 36.6 (31 Jan 2022 20:08)  T(F): 97.9 (01 Feb 2022 04:38), Max: 97.9 (01 Feb 2022 04:38)  HR: 80 (01 Feb 2022 11:19) (80 - 88)  BP: 94/52 (01 Feb 2022 04:38) (94/52 - 126/67)  BP(mean): --  RR: 20 (01 Feb 2022 04:38) (20 - 20)  SpO2: 95% (01 Feb 2022 11:19) (92% - 96%)    CBC Full  -  ( 01 Feb 2022 07:19 )  WBC Count : 5.38 K/uL  RBC Count : 2.80 M/uL  Hemoglobin : 9.2 g/dL  Hematocrit : 30.8 %  Platelet Count - Automated : 204 K/uL  Mean Cell Volume : 110.0 fl  Mean Cell Hemoglobin : 32.9 pg  Mean Cell Hemoglobin Concentration : 29.9 gm/dL  Auto Neutrophil # : x  Auto Lymphocyte # : x  Auto Monocyte # : x  Auto Eosinophil # : x  Auto Basophil # : x  Auto Neutrophil % : x  Auto Lymphocyte % : x  Auto Monocyte % : x  Auto Eosinophil % : x  Auto Basophil % : x                MICROBIOLOGY:  Clean Catch Clean Catch (Midstream)  01-20-22   >100,000 CFU/ml Enterococcus faecium (vancomycin resistant)  <10,000 CFU/ml Normal Urogenital ck present  --  Enterococcus faecium (vancomycin resistant)      .Blood Blood-Peripheral  01-20-22   No Growth Final  --  --      BAL Bronchoalveolar Lavage  01-06-22   No growth at 1 week.  --    No polymorphonuclear cells seen per low power field  No squamous epithelial cells per low power field  No organisms seen per oil power field              Vancomycin Level, Trough: 11.8 ug/mL (01-31-22 @ 20:50)  v            RADIOLOGY

## 2022-02-01 NOTE — PROGRESS NOTE ADULT - ASSESSMENT
ASSESSMENT:    86 year old gentlewoman, lifelong non-smoker, without history of intrinsic lung disease. 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 She also has a history of HTN, HLD, CAD s/p MI with preserved LVEF and moderate aortic stenosis. The patient was admitted to [facility] on December 19th 2021 with fever and abdominal pain. She was found to have perforated appendicitis with abscess formation. She was treated with tube drainage and antibiotics for a polymicrobial infection. She continues on vancomycin/ertapenem via a PICC line. Admission CT had debris within the left lobar and more distal airways of the left lower lobe as well as some debris within the right lower lobe airway - there was complete atelectasis of the left lower lobe and subsegmental atelectatic changes within the left upper lobe and dependent portions of the right lung. The patient developed increased work of breathing, tachycardia, tachypnea and hypoxemia on January 4th due to mucous plugging with complete collapse of the left lung. She underwent bronchoscopy on January 6th with removal of copious amounts of purulent secretions from throughout the bronchial tree - there was severe tracheobronchomalacia. Bronchial cultures were negative. She was treated with bronchodilators, mucolytic nebs, chest vest and nocturnal AVAPS with expansion of the left upper lobe and some reexpansion of the left lower lobe. She was discharged to rehab on January 11th on a 3lpm nasal canula with plans to continue medical and mechanical therapy as well as nocturnal BIPAP. The patient returns from rehab with decreased mental status, dyspnea and hypoxemia treated with 100% NRB. She currently is obtunded on a BIPAP device. CT scan -> partial left upper lobe and complete lingular and left lower lobe collapse with superimposed bronchial impaction - small left pleural effusion with intrafissural extension - patchy groundglass opacities at the right apex. The patient has redeveloped left lung atelectasis due to mucous plugging -> multifactorial. There is an area of inflammation/infection in the right upper lobe  1) tracheobronchomalacia with marked expiratory airway narrowing preventing adequate sputum clearance  2) weak cough following a CVA with marked deconditioning   3) dysphagia with ongoing aspiration of oral secretions  4) moderate aortic stenosis with a small left pleural effusion    2/1 - much more awake and alert; no shortness of breath or hypoxemia on a nasal canula @ 4lpm; wore AVAPS overnight; cough with difficulty expectorating sputum; no hemoptysis, chest congestion or wheeze; no fevers, chills or sweats; no chest pain/pressure or palpitations; bedbound;     PLAN/RECOMMENDATIONS:    stable oxygenation a 5lpm nasal canula  chest CT 1/31 -> mild progression of left lower lobe collapse, lingular collapse and partial left upper lobe collapse  bronchoscopy cannot be performed due to the patient's tenuous respiratory status - she would unlikely be able to be "liberated" from a ventilator if intubated - she would unlikely benefit long term from a repeat bronchoscopy  nocturnal AVAPS to facilitate lung expansion  s/p bronchoscopy 1/6 with removal of copious amounts of purulent mucous from throughout the airways - there was severe tracheobronchomalacia -> cultures are negative  continues on a prolonged course of vancomycin/ertapenem  albuterol/atrovent nebs q6h  hypertonic saline and mucomyst nebs to facilitate mucous clearance  guaifenesin via PEG  chest vest/manual chest PT  cough assist device  cardiac meds: lopressor/lipitor  DVT prophylaxis - SQ heparin  allopurinol/keppra/synthroid  glucose control  abdominal/pelvic CT - percutaneous gastrostomy tube present with intraluminal balloon - colonic diverticulosis without diverticulitis - interval removal of the right lower quadrant pigtail catheter in the region of the appendix - there is a residual, multilocular 4.0 x 2.2 cm collection with peripheral enhancement in the region of the previously perforated appendix - periappendiceal fat stranding has largely resolved - ID/IR/surgery evaluations noted - no intervention is needed    I am not hopeful that the left lung will expand due to the multiple issues delineated above - suspect that she will need to be treated with oxygen supplementation to keep saturation greater than 90% accepting the fact that her left lung will be basically non-functional    Will follow with you. Plan of care discussed with the patient at bedside, the dedicated floor NP and with Dr. Segundo. Discharge planning    Kennedy Marcano MD, [facility]  [phone]  Pulmonary Medicine

## 2022-02-01 NOTE — PROGRESS NOTE ADULT - SUBJECTIVE AND OBJECTIVE BOX
afberile  REVIEW OF SYSTEMS:  GEN: no fever,    no chills  RESP: no SOB,   no cough  CVS: no chest pain,   no palpitations  GI: no abdominal pain,   no nausea,   no vomiting,   no constipation,   no diarrhea  : no dysuria,   no frequency  NEURO: no headache,   no dizziness  PSYCH: no depression,   not anxious  Derm : no rash    MEDICATIONS  (STANDING):  acetylcysteine 20%  Inhalation 4 milliLiter(s) Inhalation three times a day  albuterol/ipratropium for Nebulization 3 milliLiter(s) Nebulizer every 6 hours  allopurinol 100 milliGRAM(s) Oral daily  artificial  tears Solution 1 Drop(s) Both EYES three times a day  atorvastatin 20 milliGRAM(s) Oral at bedtime  chlorhexidine 2% Cloths 1 Application(s) Topical <User Schedule>  ertapenem  IVPB 1000 milliGRAM(s) IV Intermittent every 24 hours  guaiFENesin Oral Liquid (Sugar-Free) 300 milliGRAM(s) Oral every 6 hours  heparin   Injectable 5000 Unit(s) SubCutaneous every 8 hours  levETIRAcetam  Solution 750 milliGRAM(s) Oral two times a day  levothyroxine 75 MICROGram(s) Oral daily  metoprolol tartrate 25 milliGRAM(s) Oral two times a day  sodium chloride 3%  Inhalation 4 milliLiter(s) Inhalation every 6 hours  vancomycin  IVPB 750 milliGRAM(s) IV Intermittent every 24 hours    MEDICATIONS  (PRN):  acetaminophen    Suspension .. 650 milliGRAM(s) Oral every 6 hours PRN Temp greater or equal to 38C (100.4F), Mild Pain (1 - 3)      Vital Signs Last 24 Hrs  T(C): 36.6 (01 Feb 2022 04:38), Max: 36.7 (31 Jan 2022 11:45)  T(F): 97.9 (01 Feb 2022 04:38), Max: 98 (31 Jan 2022 11:45)  HR: 84 (01 Feb 2022 04:38) (83 - 88)  BP: 94/52 (01 Feb 2022 04:38) (94/52 - 126/67)  BP(mean): --  RR: 20 (01 Feb 2022 04:38) (19 - 20)  SpO2: 95% (01 Feb 2022 04:38) (94% - 98%)  CAPILLARY BLOOD GLUCOSE        I&O's Summary    31 Jan 2022 07:01  -  01 Feb 2022 07:00  --------------------------------------------------------  IN: 0 mL / OUT: 750 mL / NET: -750 mL        PHYSICAL EXAM:  HEAD:  Atraumatic, Normocephalic  NECK: Supple, No   JVD  CHEST/LUNG:   no     rales,     no,    rhonchi  HEART: Regular rate and rhythm;         murmur  ABDOMEN: Soft, Nontender, ;   EXTREMITIES:   no     edema  NEUROLOGY:  alert    LABS:                        9.2    5.38  )-----------( 204      ( 01 Feb 2022 07:19 )             30.8                           Thyroid Stimulating Hormone, Serum: 2.39 uIU/mL (01-07 @ 09:13)          Consultant(s) Notes Reviewed:      Care Discussed with Consultants/Other Providers:

## 2022-02-01 NOTE — PROGRESS NOTE ADULT - ASSESSMENT
87 y/o female, hx of CVA, MI, breast CA, COPD, not on home 02, s/p recent  perforated  appendox/ abscess,  HN, HLD, hypothyroid,  obexity, relatively  be d boind, , gout BIBEMS from [facility] due to SOB., started this morning patient initial 02 sat was 84% placed on NRB and improved to 93 with recent perforated appendicitis, possible IE on long term iv abx     John Fragoso  Attending Physician   Division of Infectious Disease  Pager #[phone]  Available on Microsoft Teams also  After 5pm/weekend or no response, call #[phone]

## 2022-02-01 NOTE — PROGRESS NOTE ADULT - SUBJECTIVE AND OBJECTIVE BOX
[facility] - PROGRESS NOTE    CHIEF COMPLAINT: acute hypoxic respiratory failure; mucous plugging; left lung atelectasis; weak cough; dysphagia; tracheobronchomalacia; PEG;     INTERVAL HISTORY: much more awake and alert; no shortness of breath or hypoxemia on a nasal canula @ 4lpm; wore AVAPS overnight; cough with difficulty expectorating sputum; no hemoptysis, chest congestion or wheeze; no fevers, chills or sweats; no chest pain/pressure or palpitations; bedbound; CT scan 1/31 -> mild progression of left lower lobe collapse, lingular collapse and partial left upper lobe collapse;    REVIEW OF SYSTEMS:  Constitutional: As per interval history  HEENT: Within normal limits  CV: As per interval history  Resp: As per interval history  GI: dysphagia -> PEG  : Within normal limits  Musculoskeletal: Within normal limits  Skin: Within normal limits  Neurological: CVA - > left sided weakness/plegia  Psychiatric: Within normal limits  Endocrine: Within normal limits  Hematologic/Lymphatic: Within normal limits  Allergic/Immunologic: Within normal limits    MEDICATIONS:     Pulmonary "  acetylcysteine 20%  Inhalation 4 milliLiter(s) Inhalation three times a day  albuterol/ipratropium for Nebulization 3 milliLiter(s) Nebulizer every 6 hours  guaiFENesin Oral Liquid (Sugar-Free) 300 milliGRAM(s) Oral every 6 hours  sodium chloride 3%  Inhalation 4 milliLiter(s) Inhalation every 6 hours    Anti-microbials:  ertapenem  IVPB 1000 milliGRAM(s) IV Intermittent every 24 hours  vancomycin  IVPB 750 milliGRAM(s) IV Intermittent every 24 hours    Cardiovascular:  metoprolol tartrate 25 milliGRAM(s) Oral two times a day    Other:  allopurinol 100 milliGRAM(s) Oral daily  artificial  tears Solution 1 Drop(s) Both EYES three times a day  atorvastatin 20 milliGRAM(s) Oral at bedtime  chlorhexidine 2% Cloths 1 Application(s) Topical <User Schedule>  heparin   Injectable 5000 Unit(s) SubCutaneous every 8 hours  levETIRAcetam  Solution 750 milliGRAM(s) Oral two times a day  levothyroxine 75 MICROGram(s) Oral daily  nystatin Powder 1 Application(s) Topical two times a day    MEDICATIONS  (PRN):  acetaminophen    Suspension .. 650 milliGRAM(s) Oral every 6 hours PRN Temp greater or equal to 38C (100.4F), Mild Pain (1 - 3)    OBJECTIVE:    PHYSICAL EXAM:       ICU Vital Signs Last 24 Hrs  T(C): 36.6 (01 Feb 2022 12:51), Max: 36.6 (31 Jan 2022 20:08)  T(F): 97.8 (01 Feb 2022 12:51), Max: 97.9 (01 Feb 2022 04:38)  HR: 76 (01 Feb 2022 15:40) (76 - 88)  BP: 90/53 (01 Feb 2022 12:51) (90/53 - 126/67)  BP(mean): 64 (01 Feb 2022 12:51) (64 - 64)  ABP: --  ABP(mean): --  RR: 20 (01 Feb 2022 12:51) (20 - 20)  SpO2: 95% (01 Feb 2022 15:40) (92% - 96%) on 4lpm nasal canula     General: Awake. Alert. Cooperative. No distress Appears stated age.	  HEENT: Atraumatic. Normocephalic. Anicteric. Normal oral mucosa. PERRL. EOMI. Mallampati class IV airway.  Neck: Supple. Trachea midline. Thyroid without enlargement/tenderness/nodules. No carotid bruit. No JVD. Short and wide  Cardiovascular: Regular rate and rhythm. S1 S2 normal. III/VI systolic murmur  Respiratory: Respirations unlabored. Decreased breath sounds left hemithorax. No curvature.  Abdomen: Soft. Non-tender. Non-distended. No organomegaly. No masses. Normal bowel sounds. Obese, PEG.  Extremities: Warm to touch. No clubbing or cyanosis. No pedal edema. LUE PICC line.  Pulses: 2+ peripheral pulses all extremities.	  Skin: Normal skin color. No rashes or lesions. No ecchymoses. No cyanosis. Warm to touch.  Lymph Nodes: Cervical, supraclavicular and axillary nodes normal  Neurological: Left lower extremity plegia with contraction. Left upper extremity is quite weak. A and O x 3  Psychiatry: Calm    LABS:                          9.2    5.38  )-----------( 204      ( 01 Feb 2022 07:19 )             30.8     CBC    WBC  5.38 <==, 4.93 <==, 5.94 <==, 6.44 <==    Hemoglobin  9.2 <<==, 9.1 <<==, 9.6 <<==, 9.4 <<==    Hematocrit  30.8 <==, 31.0 <==, 32.6 <==, 30.2 <==    Platelets  204 <==, 193 <==, 195 <==, 192 <==      145  |  107  |  23  ----------------------------<  132<H>    01-29  4.5   |  24  |  0.68      LYTES    sodium  145 <==, 144 <==, 142 <==    potassium   4.5 <==, 4.2 <==, 4.6 <==    chloride  107 <==, 106 <==, 107 <==    carbon dioxide  24 <==, 23 <==, 22 <==    =============================================================================================  RENAL FUNCTION:    Creatinine:   0.68  <<==, 0.65  <<==, 0.63  <<==    BUN:   23 <==, 24 <==, 21 <==    ============================================================================================    calcium   9.6 <==, 9.6 <==, 9.3 <==    ============================================================================================  LFTs    AST:   16 <==     ALT:  13  <==     AP:  110  <=    Bili:  0.3  <=    Venous Blood Gas:  01-20 @ 08:53  7.34/55/52/30/83.0  VBG Lactate: 1.7    Serum Pro-Brain Natriuretic Peptide: 148 pg/mL (01-20 @ 08:53)    CARDIAC MARKERS ( 20 Jan 2022 08:53 )  CPK x     /CKMB x     /CKMB Units x        troponin 21 ng/L    < from: Transthoracic Echocardiogram (12.21.21 @ 14:39) >    Patient name: FRANKI MEHTA  YOB: 1935   Age: 86 (F)   MR#: [id number]  Study Date: 12/21/2021  Location: [address] [place], [place] [id number]: [facility]  Study quality: Technically difficult/Limited  Referring Physician: Edmond Almazan MD  Blood Pressure: 134/77 mmHg  Height: 163 cm  Weight: 109 kg  BSA: 2.1 m2  Heart Rate: 105 mmHg  ------------------------------------------------------------------------  PROCEDURE: Transthoracic echocardiogram with 2-D, M-Mode  and complete spectral andcolor flow Doppler.  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 No segmental  wall motion abnormalities.  Moderate aortic stenosis.  There may be a vegetation on the left or non-coronary cusp  of the aortic valve. Consider transesophageal  echocardiography.  ------------------------------------------------------------------------  Confirmed on  12/22/2021 - 10:32:40 by Rahul Correa M.D.  ------------------------------------------------------------------------    MICROBIOLOGY:     Respiratory Viral Panel with COVID-19 by RYAN (01.20.22 @ 08:52)   Rapid RVP Result: [facility][1]   [facility]    Urinalysis Basic - ( 20 Jan 2022 14:56 )    Color: Yellow / Appearance: Slightly Turbid / SG: >1.050 / pH: x  Gluc: x / Ketone: Negative  / Bili: Negative / Urobili: Negative   Blood: x / Protein: 100 / Nitrite: Negative   Leuk Esterase: Large / RBC: 7 /hpf / WBC 25 /HPF   Sq Epi: x / Non Sq Epi: 5 /hpf / Bacteria: Few    Culture - Urine (01.20.22 @ 16:37)   Culture Results:   >100,000 CFU/ml Enterococcus faecium (vancomycin resistant)   <10,000 CFU/ml Normal Urogenital ck present     Culture - Bronchial (01.06.22 @ 18:48)   Culture Results:   Normal Respiratory Ck present     Culture - Abscess with Gram Stain (12.23.21 @ 18:49)   Specimen Source: .Abscess abdominal abscess   Culture Results:   Few Escherichia coli   Few Morganella morganii   Moderate Enterococcus avium   Numerous Bacteroides thetaiotamcron group "Susceptibilities not performed"   Numerous Clostridium ramosum "Susceptibilities not performed"     Culture - Blood (12.19.21 @ 14:24)   Culture Results:   Growth in aerobic bottle: Staphylococcus epidermidis   Organism Identification: Staphylococcus epidermidis     Culture - Blood (12.19.21 @ 14:24)   Gram Stain:   Growth in aerobic bottle: Gram Positive Cocci in Clusters   Growth in anaerobic bottle: Gram Positive Cocci in Clusters   - Staphylococcus epidermidis, Methicillin resistant: Detec     RADIOLOGY:  [x] Chest radiographs reviewed and interpreted by me    EXAM:  CT CHEST                          PROCEDURE DATE:  01/31/2022      FINDINGS:    LYMPH NODES: No mediastinal lymphadenopathy.    HEART/VASCULATURE: Heart size is normal. No pericardial effusion. Aortic   and mitral valve calcification. Partially visualized left PICC line   catheter with the tip in the SVC.    AIRWAYS/LUNGS/PLEURA: Complete collapse of the left lower and lingular   lobes with partial collapse of the left upper lobe with bronchial   impaction, progressed compared to prior. There is leftward mediastinal   shift.  Mosaic attenuation of the left upper lobe.  Patchy groundglass opacities at the right apex, similar to prior.  Dependent right lung subsegmental atelectasis.  Stable trace left pleural effusion with intrafissural extension.    UPPER ABDOMEN: Calcified hepatic granuloma.    BONES/SOFT TISSUES: Status post right mastectomy. Status post left   humerus ORIF.    IMPRESSION:    Persistent left lower lobe collapse, lingular collapse and partial left   upper lobe collapse when compared with previous exam. There is mild   progression since the previous study.      Stable patchy groundglass opacities of the right lung apex, suggestive of   infection versus inflammation.    Remaining incidental findings as above.    SHERLEY WATSON MD; Resident Radiologist  This document has been electronically signed.  FAUSTINO PIZANO MD; Attending Radiologist  This document has been electronically signed. Jan 31 2022  4:36PM  ---------------------------------------------------------------------------------------------------------------   EXAM:  XR CHEST PORTABLE URGENT 1V                          PROCEDURE DATE:  01/28/2022      INTERPRETATION:  EXAMINATION: XR CHEST URGENT    CLINICAL INDICATION: Mucus plugging, left lung atelectasis.    TECHNIQUE: Single frontal, portable view of the chest was obtained.    COMPARISON: Chest radiograph 1/27/2022.    FINDINGS:    Left PICC tip within SVC. Status post ORIF of the left humerus.  Cannot accurately assess cardiac size in this projection.  Left lower lung consolidation and volume loss is unchanged.  Left pleural effusion. No pneumothorax.    IMPRESSION:  Left lower lung consolidation and volume loss is unchanged.    MOUSTAPHA COURTNEY MD; Resident Radiology  This document has been electronically signed.  GIANNI MANTILLA MD; Attending Radiologist  This document has been electronically signed. Jan 28 2022  6:32PM  ---------------------------------------------------------------------------------------------------------------  EXAM:  CT ABDOMEN AND PELVIS IC                        EXAM:  CT ANGIO CHEST PULM [facility]                          PROCEDURE DATE:  01/20/2022      FINDINGS:    CHEST:    PULMONARY ANGIOGRAM: Limited study secondary to extensive respiratory   motion artifact. No evidence of large central pulmonary embolism, with   limited evaluation of lobar, segmental and subsegmental branches. The   pulmonary artery is enlarged measuring 3.3 cm, nonspecific although can   be seen with pulmonary hypertension.    MEDIASTINUM/LYMPH NODES: No mediastinal or hilar lymphadenopathy. Small   hiatal hernia.    AIRWAYS/LUNGS/PLEURA: Degraded by respiratory motion artifact. There is   partial left upper lobe and complete lingular and left lower lobe   collapse with superimposed bronchial impaction. There is a small left   pleural effusion with intrafissural extension. There are patchy   groundglass opacities at the right apex, likely infectious/inflammatory.   No pneumothorax. Mild subpleural reticulation along the right middle lobe   may be secondary to right breast/chest wall radiotherapy.    HEART/VASCULATURE: Heart size is normal. No pericardial effusion. Mild   aortic valvular and mitral annular calcification. A left PICC is present   which has repositioned between the acquisition of the  imaging   (terminating in the SVC on ) and the chest CTA, now crossing midline   into the right innominate vein.    CHEST WALL AND LOWER NECK: The thyroid is incompletely characterized on   this study. Status post right mastectomy.    ABDOMEN AND PELVIS:    LIVER: Within normal limits. Calcified hepatic granuloma.    BILE DUCTS: No intrahepatic biliary ductal dilatation. Redemonstration of   choledocholithiasis in the ampullary region, unchanged since 12/30/2021.    GALLBLADDER: Cholelithiasis.    SPLEEN: Within normal limits.    PANCREAS: 1 cm pancreatic tail cyst.    ADRENALS: Within normal limits.    KIDNEYS/URETERS: Mildly atrophic left kidney. No hydronephrosis.    BLADDER: Within normal limits.    REPRODUCTIVE ORGANS: Uterus present. There is a 1.4 cm right adnexal cyst.    BOWEL: Percutaneous gastrostomy tube present with intraluminal balloon.   No bowel obstruction. Colonic diverticulosis without diverticulitis.   Interval removal of the right lower quadrant pigtail catheter in the   region of the appendix. There is a residual, multilocular 4.0 x 2.2 cm   collection with peripheral enhancement (series 3, image 84) in the region   of previously perforated appendix. Previously seen periappendiceal fat   stranding has largely resolved.    PERITONEUM: No ascites.    VESSELS: Aortic calcifications. Hypodense lesion noted at the origin of   the SMA which was previously seen but not as well appreciated on prior   noncontrast enhanced study on 12/30/2021. SMA however appears patent.    RETROPERITONEUM/LYMPH NODES: No lymphadenopathy.    ABDOMINAL WALL: . Fat-containing umbilical hernia.    BONES: Remote left proximal humeral fracture status post ORIF. Unchanged   T11 and L1 superior endplate compression deformities.    IMPRESSION:  Limited exam for pulmonary embolism; no central pulmonary embolism with   nondiagnostic evaluation of lobar, segmental and subsegmental branches.   No imaging evidence of right heart strain.    Complete lingular and left lower lobe collapse withsuperimposed   bronchial impaction. Small left pleural effusion. Mild patchy right   apical groundglass may be related to pulmonary edema, infection or   inflammation.    Multilocular 4.0 x 2.2 cm right lower quadrant fluid collection in the   regionof previously perforated appendix, status post drain removal.   Periappendiceal fat stranding has largely resolved.    Left PICC crosses midline and terminates in the right innominate vein,   likely repositioned during the administration of contrast between    imaging and CTA acquisition.    Cholelithiasis and choledocholithiasis. No intrahepatic biliary ductal   dilatation.    MARKO GUAMAN DO; Resident Radiologist  This document has been electronically signed.  SHARATH TURCIOS M.D., Attending Radiologist  This document has been electronically signed. Jan 20 2022 12:48PM  ---------------------------------------------------------------------------------------------------------------  EXAM:  CT BRAIN                          PROCEDURE DATE:  01/20/2022      FINDINGS:    Study is limited by motion.    No hydrocephalus, midline shift, or effacement of the basal cisterns. No   gross evidence for acute intracranial hemorrhage or brain edema.    Complete opacification of the right maxillary sinus and right mastoid air   cells.    IMPRESSION:    Limited by motion.    No gross evidence for acute intracranial hemorrhage or brain edema.    No hydrocephalus or midline shift.    Complete opacification of the right maxillary sinus and right mastoid air   cells. Correlate for the presence of sinusitis/mastoiditis.    LESLI KING MD; Attending Radiologist  This document has been electronically signed. Jan 20 2022 10:38AM  ---------------------------------------------------------------------------------------------------------------

## 2022-02-01 NOTE — PROGRESS NOTE ADULT - SUBJECTIVE AND OBJECTIVE BOX
CARDIOLOGY     PROGRESS  NOTE   ________________________________________________    CHIEF COMPLAINT:Patient is a 86y old  Female who presents with a chief complaint of sob (01 Feb 2022 07:50)  doing better.  	  REVIEW OF SYSTEMS:  CONSTITUTIONAL: No fever, weight loss, or fatigue  EYES: No eye pain, visual disturbances, or discharge  ENT:  No difficulty hearing, tinnitus, vertigo; No sinus or throat pain  NECK: No pain or stiffness  RESPIRATORY: No cough, wheezing, chills or hemoptysis;  decrease  Shortness of Breath  CARDIOVASCULAR: No chest pain, palpitations, passing out, dizziness, or leg swelling  GASTROINTESTINAL: No abdominal or epigastric pain. No nausea, vomiting, or hematemesis; No diarrhea or constipation. No melena or hematochezia.  GENITOURINARY: No dysuria, frequency, hematuria, or incontinence  NEUROLOGICAL: No headaches, memory loss, loss of strength, numbness, or tremors  SKIN: No itching, burning, rashes, or lesions   LYMPH Nodes: No enlarged glands  ENDOCRINE: No heat or cold intolerance; No hair loss  MUSCULOSKELETAL: No joint pain or swelling; No muscle, back, or extremity pain  PSYCHIATRIC: No depression, anxiety, mood swings, or difficulty sleeping  HEME/LYMPH: No easy bruising, or bleeding gums  ALLERGY AND IMMUNOLOGIC: No hives or eczema	    [ ] All others negative	  [ ] Unable to obtain    PHYSICAL EXAM:  T(C): 36.6 (02-01-22 @ 04:38), Max: 36.7 (01-31-22 @ 11:45)  HR: 84 (02-01-22 @ 04:38) (83 - 88)  BP: 94/52 (02-01-22 @ 04:38) (94/52 - 126/67)  RR: 20 (02-01-22 @ 04:38) (19 - 20)  SpO2: 95% (02-01-22 @ 04:38) (94% - 96%)  Wt(kg): --  I&O's Summary    31 Jan 2022 07:01  -  01 Feb 2022 07:00  --------------------------------------------------------  IN: 370 mL / OUT: 750 mL / NET: -380 mL        Appearance: Normal	  HEENT:   Normal oral mucosa, PERRL, EOMI	  Lymphatic: No lymphadenopathy  Cardiovascular: Normal S1 S2, No JVD, No murmurs, No edema  Respiratory: Lungs clear to auscultation	  Psychiatry: A & O x 3, Mood & affect appropriate  Gastrointestinal:  Soft, Non-tender, + BS	  Skin: No rashes, No ecchymoses, No cyanosis	  Neurologic: Non-focal  Extremities: Normal range of motion, No clubbing, cyanosis or edema  Vascular: Peripheral pulses palpable 2+ bilaterally    MEDICATIONS  (STANDING):  acetylcysteine 20%  Inhalation 4 milliLiter(s) Inhalation three times a day  albuterol/ipratropium for Nebulization 3 milliLiter(s) Nebulizer every 6 hours  allopurinol 100 milliGRAM(s) Oral daily  artificial  tears Solution 1 Drop(s) Both EYES three times a day  atorvastatin 20 milliGRAM(s) Oral at bedtime  chlorhexidine 2% Cloths 1 Application(s) Topical <User Schedule>  ertapenem  IVPB 1000 milliGRAM(s) IV Intermittent every 24 hours  guaiFENesin Oral Liquid (Sugar-Free) 300 milliGRAM(s) Oral every 6 hours  heparin   Injectable 5000 Unit(s) SubCutaneous every 8 hours  levETIRAcetam  Solution 750 milliGRAM(s) Oral two times a day  levothyroxine 75 MICROGram(s) Oral daily  metoprolol tartrate 25 milliGRAM(s) Oral two times a day  sodium chloride 3%  Inhalation 4 milliLiter(s) Inhalation every 6 hours  vancomycin  IVPB 750 milliGRAM(s) IV Intermittent every 24 hours      TELEMETRY: 	    ECG:  	  RADIOLOGY:  OTHER: 	  	  LABS:	 	    CARDIAC MARKERS:                                9.2    5.38  )-----------( 204      ( 01 Feb 2022 07:19 )             30.8           proBNP: Serum Pro-Brain Natriuretic Peptide: 148 pg/mL (01-20 @ 08:53)    Lipid Profile:   HgA1c:   TSH: Thyroid Stimulating Hormone, Serum: 2.39 uIU/mL (01-07 @ 09:13)    < from: CT Chest No Cont (01.31.22 @ 13:55) >  Persistent left lower lobe collapse, lingular collapse and partial left   upper lobe collapse when compared with previous exam. There is mild   progression since the previous study.      Stable patchy groundglass opacities of the right lung apex, suggestive of   infection versus inflammation.    Remaining incidental findings as above.      Assessment and plan  ---------------------------  85 y/o female, hx of CVA, MI, breast CA, COPD, not on home 02, s/p  recent  perforated  appendix / abscess,  HN, HLD, hypothyroid,  obesity relatively  bed bound , gout   BIBEMS from [facility] due to SOB., started this morning   patient initial 02 sat was 84% placed on NRB and improved to 93%.  has a pic line to left arm ,  and  peg tube  pt had a long course of admission sec to mucus plug and collapsed lung, s/p bronchoscopy.  ct angio of the chest noted, sig abnormality with collapse of the lung  small pleural effusion, doubt chf  agree with iv abx  dvt prophylaxis  pulmonary noted  ecg noted with old inferior wall mi, no acute changes  continue current meds  continue current bp meds  echo noted  will increase beta blocker as tolerated  remained in NSR   sob sec to underlying lung disease  dvt prophylaxis  continue abx, fu blood cultures  pulmonary noted  repeat blood work  ct chest noted , ID/ pulmonary comments

## 2022-02-01 NOTE — PROGRESS NOTE ADULT - ASSESSMENT
86   year old female    h/o hemorrhagic CVA, has  PEG,  HTN, MI,   HLD, gout, right ca  breast   right Ca breast. s/p mastectomy in 2019,  s/p  sentinel node  localization.  4/4,  were  negative  peg dislodged.  IR   replacements  on 1/3/22   picc  and iv ab  for 6 weeks, per  ID, on  last  visit  s/p rrt   for hypoxia. cxr with complete  collapsed  of  left lung, needing broch, by  pul[facility]   s/ p  bronchoscopy , pt  with  tracheomalacia  and  collapsed  airways,  makes  this a  difficult  situation, as there is no good rx for this          *  p/w   sob. acute  resp failure  on Bipap,  from left   lung  collapse  afberile,  with normal  wbc  on arrival  *   s/p cva, has  PEG,  npo  ,  on  synthroid, Keppra  ,  *  HTN, on meds  per  card  *  h/o ca breast. /, s/p  R  mastectomy  * obesity, bmi  was   41, now  is  34 in  er  left  leg contracture ,  remains  bed  bound. functional  paraplegias  needs  assistance  for  all her  adl's  *   h/o bacteremia. on 12/19/21,  Staph epi, meth R,  from perforated  appendicitis  ct  c/a/p, from last  visit   pna, perforated  appendicitis,  ?  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 this will not alter rx. pt  will need   extended  course  of ab     on iv  vanco and ertapenem.  till  2/9/.22  ID dr reagan, and per  surg  and  IR  eval,  no intervnetion  CT  1/20/22, no PE. collection in right side   on Proventil,  Mucomyst and   saline  nebs.   * pt  with  h/o  tracheomalacia  and  collapsed  airways,  makes  this a  difficult  situation, as there is no good rx for this  given pt;s  mlple co morbidities,  pt is  at risk for  re admissions  picc  line/  on nocturnal  awaps  difficult  situation, a s there  is no  good  rx  for  pt's  recurrent lung collapse hypoxia  remaains hypoxic/ pulm to  f/p  on n/ [place] oxygen/  when her O2  needs  are less, then can start  d/c  planning to rehab    norvasc stopped/ on lopressor/  on  oxygen    start   d/c  planning,  when cleraed  by  pulm       per  son, pt is  full code     rad< from: CT Angio Chest PE Protocol w/ IV Cont (01.20.22 @ 10:35) >  IMPRESSION:  Limited exam for pulmonary embolism; no central pulmonary embolism with   nondiagnostic evaluation of lobar, segmental and subsegmental branches.   No imaging evidence of right heart strain.  Complete lingular and left lower lobe collapse withsuperimposed   bronchial impaction. Small left pleural effusion. Mild patchy right   apical groundglass may be related to pulmonary edema, infection or   inflammation.  Multilocular 4.0 x 2.2 cm right lower quadrant fluid collection in the   regionof previously perforated appendix, status post drain removal.   Periappendiceal fat stranding has largely resolved.  Left PICC crosses midline and terminates in the right innominate vein,   likely repositioned during the administration of contrast between    imaging and CTA acquisition.  Cholelithiasis and choledocholithiasis. No intrahepatic biliary ductal   dilatation.  --- End of Report ---  < end of copied text >

## 2022-02-02 NOTE — PHYSICAL THERAPY INITIAL EVALUATION ADULT - ACTIVE RANGE OF MOTION EXAMINATION, REHAB EVAL
left UE shoulder flex 100deg, Left elbow -10degrees from ext, left hand/digit contracted, right shoulder flex to 70 degrees, left elbow/hand contracted, Right LE WFL, left LE knee contracted at 90deg, left hip contracted 80 degrees

## 2022-02-02 NOTE — PROGRESS NOTE ADULT - SUBJECTIVE AND OBJECTIVE BOX
FRANKI MEHTA 86y [id number]    Patient is a 86y old  Female who presents with a chief complaint of sob (02 Feb 2022 08:40)      Follow Up/CC:  ID following for bacteremia    Interval History/ROS: no fever    Allergies    Toradol (Urticaria (Mild to Mod); Rash (Mild to Mod))    Intolerances        ANTIMICROBIALS:  ertapenem  IVPB 1000 every 24 hours  vancomycin  IVPB 750 every 24 hours      MEDICATIONS  (STANDING):  acetylcysteine 20%  Inhalation 4 milliLiter(s) Inhalation three times a day  albuterol/ipratropium for Nebulization 3 milliLiter(s) Nebulizer every 6 hours  allopurinol 100 milliGRAM(s) Oral daily  artificial  tears Solution 1 Drop(s) Both EYES three times a day  atorvastatin 20 milliGRAM(s) Oral at bedtime  chlorhexidine 2% Cloths 1 Application(s) Topical <User Schedule>  ertapenem  IVPB 1000 milliGRAM(s) IV Intermittent every 24 hours  guaiFENesin Oral Liquid (Sugar-Free) 300 milliGRAM(s) Oral every 6 hours  heparin   Injectable 5000 Unit(s) SubCutaneous every 8 hours  levETIRAcetam  Solution 750 milliGRAM(s) Oral two times a day  levothyroxine 75 MICROGram(s) Oral daily  metoprolol tartrate 25 milliGRAM(s) Oral two times a day  nystatin Powder 1 Application(s) Topical two times a day  sodium chloride 3%  Inhalation 4 milliLiter(s) Inhalation every 6 hours  vancomycin  IVPB 750 milliGRAM(s) IV Intermittent every 24 hours    MEDICATIONS  (PRN):  acetaminophen    Suspension .. 650 milliGRAM(s) Oral every 6 hours PRN Temp greater or equal to 38C (100.4F), Mild Pain (1 - 3)        Vital Signs Last 24 Hrs  T(C): 36.7 (02 Feb 2022 05:10), Max: 36.7 (02 Feb 2022 05:10)  T(F): 98 (02 Feb 2022 05:10), Max: 98 (02 Feb 2022 05:10)  HR: 75 (02 Feb 2022 09:20) (70 - 97)  BP: 110/73 (02 Feb 2022 05:10) (90/53 - 110/73)  BP(mean): 64 (01 Feb 2022 12:51) (64 - 64)  RR: 20 (02 Feb 2022 05:10) (20 - 20)  SpO2: 98% (02 Feb 2022 09:20) (92% - 98%)    CBC Full  -  ( 01 Feb 2022 07:19 )  WBC Count : 5.38 K/uL  RBC Count : 2.80 M/uL  Hemoglobin : 9.2 g/dL  Hematocrit : 30.8 %  Platelet Count - Automated : 204 K/uL  Mean Cell Volume : 110.0 fl  Mean Cell Hemoglobin : 32.9 pg  Mean Cell Hemoglobin Concentration : 29.9 gm/dL  Auto Neutrophil # : x  Auto Lymphocyte # : x  Auto Monocyte # : x  Auto Eosinophil # : x  Auto Basophil # : x  Auto Neutrophil % : x  Auto Lymphocyte % : x  Auto Monocyte % : x  Auto Eosinophil % : x  Auto Basophil % : x                MICROBIOLOGY:  Clean Catch Clean Catch (Midstream)  01-20-22   >100,000 CFU/ml Enterococcus faecium (vancomycin resistant)  <10,000 CFU/ml Normal Urogenital ck present  --  Enterococcus faecium (vancomycin resistant)      .Blood Blood-Peripheral  01-20-22   No Growth Final  --  --      BAL Bronchoalveolar Lavage  01-06-22   No growth at 1 week.  --    No polymorphonuclear cells seen per low power field  No squamous epithelial cells per low power field  No organisms seen per oil power field              Vancomycin Level, Trough: 11.9 ug/mL (02-01-22 @ 20:35)  v            RADIOLOGY

## 2022-02-02 NOTE — PHYSICAL THERAPY INITIAL EVALUATION ADULT - ADDITIONAL COMMENTS
contd frm above: CT chest: persistent L lower lobe collapse, stable patchy opacities, CXR: Left lower lung consolidation and volume loss is unchanged; CT angio chest:Complete lingular and left lower lobe collapse with superimposed bronchial impaction. Small left pleural effusion. Mild patchy right apical roundglass may be related to pulmonary edema, infection or inflammation. CT head: (-)    social history: pt states resides at [facility], pt bedbound, states 2 person to transfer to wheelchair, (questionable reliability)

## 2022-02-02 NOTE — PROGRESS NOTE ADULT - SUBJECTIVE AND OBJECTIVE BOX
afberile,, less  hypoxic    REVIEW OF SYSTEMS:  GEN: no fever,    no chills  RESP: no SOB,   no cough  CVS: no chest pain,   no palpitations  GI: no abdominal pain,   no nausea,   no vomiting,   no constipation,   no diarrhea  : no dysuria,   no frequency  NEURO: no headache,   no dizziness  PSYCH: no depression,   not anxious  Derm : no rash    MEDICATIONS  (STANDING):  acetylcysteine 20%  Inhalation 4 milliLiter(s) Inhalation three times a day  albuterol/ipratropium for Nebulization 3 milliLiter(s) Nebulizer every 6 hours  allopurinol 100 milliGRAM(s) Oral daily  artificial  tears Solution 1 Drop(s) Both EYES three times a day  atorvastatin 20 milliGRAM(s) Oral at bedtime  chlorhexidine 2% Cloths 1 Application(s) Topical <User Schedule>  ertapenem  IVPB 1000 milliGRAM(s) IV Intermittent every 24 hours  guaiFENesin Oral Liquid (Sugar-Free) 300 milliGRAM(s) Oral every 6 hours  heparin   Injectable 5000 Unit(s) SubCutaneous every 8 hours  levETIRAcetam  Solution 750 milliGRAM(s) Oral two times a day  levothyroxine 75 MICROGram(s) Oral daily  metoprolol tartrate 25 milliGRAM(s) Oral two times a day  nystatin Powder 1 Application(s) Topical two times a day  sodium chloride 3%  Inhalation 4 milliLiter(s) Inhalation every 6 hours  vancomycin  IVPB 750 milliGRAM(s) IV Intermittent every 24 hours    MEDICATIONS  (PRN):  acetaminophen    Suspension .. 650 milliGRAM(s) Oral every 6 hours PRN Temp greater or equal to 38C (100.4F), Mild Pain (1 - 3)      Vital Signs Last 24 Hrs  T(C): 36.7 (02 Feb 2022 05:10), Max: 36.7 (02 Feb 2022 05:10)  T(F): 98 (02 Feb 2022 05:10), Max: 98 (02 Feb 2022 05:10)  HR: 92 (02 Feb 2022 05:10) (70 - 97)  BP: 110/73 (02 Feb 2022 05:10) (90/53 - 110/73)  BP(mean): 64 (01 Feb 2022 12:51) (64 - 64)  RR: 20 (02 Feb 2022 05:10) (20 - 20)  SpO2: 96% (02 Feb 2022 05:10) (92% - 98%)  CAPILLARY BLOOD GLUCOSE        I&O's Summary    01 Feb 2022 07:01  -  02 Feb 2022 07:00  --------------------------------------------------------  IN: 0 mL / OUT: 450 mL / NET: -450 mL        PHYSICAL EXAM:  HEAD:  Atraumatic, Normocephalic  NECK: Supple, No   JVD  CHEST/LUNG:   no     rales,     no,    rhonchi  HEART: Regular rate and rhythm;         murmur  ABDOMEN: Soft, Nontender, ;   EXTREMITIES:   no     edema  NEUROLOGY:  alert    LABS:                        9.2    5.38  )-----------( 204      ( 01 Feb 2022 07:19 )             30.8                           Thyroid Stimulating Hormone, Serum: 2.39 uIU/mL (01-07 @ 09:13)          Consultant(s) Notes Reviewed:      Care Discussed with Consultants/Other Providers:

## 2022-02-02 NOTE — PHYSICAL THERAPY INITIAL EVALUATION ADULT - CRITERIA FOR SKILLED THERAPEUTIC INTERVENTIONS
impairments found/functional limitations in following categories/rehab potential/predicted duration of therapy intervention/anticipated equipment needs at discharge/anticipated discharge recommendation

## 2022-02-02 NOTE — PROGRESS NOTE ADULT - ASSESSMENT
86   year old female    h/o hemorrhagic CVA, has  PEG,  HTN, MI,   HLD, gout, right ca  breast   right Ca breast. s/p mastectomy in 2019,  s/p  sentinel node  localization.  4/4,  were  negative  peg dislodged.  IR   replacements  on 1/3/22   picc  and iv ab  for 6 weeks, per  ID, on  last  visit  s/p rrt   for hypoxia. cxr with complete  collapsed  of  left lung, needing broch, by  [facility]   s/ p  bronchoscopy , pt  with  tracheomalacia  and  collapsed  airways,  makes  this a  difficult  situation, as there is no good rx for this          *  p/w   sob. acute  resp failure  on Bipap,  from left   lung  collapse  afberile,  with normal  wbc  on arrival  *   s/p cva, has  PEG,  npo  ,  on  synthroid, Keppra  ,  *  HTN, on meds  per  card  *  h/o ca breast. /, s/p  R  mastectomy  * obesity, bmi  was   43, now  is  34 in  er  left  leg contracture ,  remains  bed  bound. functional  paraplegias  needs  assistance  for  all her  adl's  *   h/o bacteremia. on 12/19/21,  Staph epi, meth R,  from perforated  appendicitis  ct  c/a/p, from last  visit   pna, perforated  appendicitis,  ?  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 this will not alter rx. pt  will need   extended  course  of ab     on iv  vanco and ertapenem.  till  2/9/.22  ID dr reagan, and per  surg  and  IR  eval,  no intervnetion  CT  1/20/22, no PE. collection in right side   on Proventil,  Mucomyst and   saline  nebs.   * pt  with  h/o  tracheomalacia  and  collapsed  airways,  makes  this a  difficult  situation, as there is no good rx for this  given pt;s  mlple co morbidities,  pt is  at risk for  re admissions  picc  line/  on nocturnal  awaps  difficult  situation, a s there  is no  good  rx  for  pt's  recurrent lung collapse hypoxia  on n/ [place] oxygen/  when her O2  needs  are less, then can start  d/c  planning to rehab    norvasc stopped/ on lopressor/  on  oxygen  on 4 L  now.  may start   d/c planning to  rehab    has  been  cleared  by  pulm.  and  re  admission  likely,, given her  airways, and  propensity  for  lung collapse       per  son, pt is  full code     rad< from: CT Angio Chest PE Protocol w/ IV Cont (01.20.22 @ 10:35) >  IMPRESSION:  Limited exam for pulmonary embolism; no central pulmonary embolism with   nondiagnostic evaluation of lobar, segmental and subsegmental branches.   No imaging evidence of right heart strain.  Complete lingular and left lower lobe collapse withsuperimposed   bronchial impaction. Small left pleural effusion. Mild patchy right   apical groundglass may be related to pulmonary edema, infection or   inflammation.  Multilocular 4.0 x 2.2 cm right lower quadrant fluid collection in the   regionof previously perforated appendix, status post drain removal.   Periappendiceal fat stranding has largely resolved.  Left PICC crosses midline and terminates in the right innominate vein,   likely repositioned during the administration of contrast between    imaging and CTA acquisition.  Cholelithiasis and choledocholithiasis. No intrahepatic biliary ductal   dilatation.  --- End of Report ---  < end of copied text >                    86   year old female    h/o hemorrhagic CVA, has  PEG,  HTN, MI,   HLD, gout, right ca  breast   right Ca breast. s/p mastectomy in 2019,  s/p  sentinel node  localization.  4/4,  were  negative  peg dislodged.  IR   replacements  on 1/3/22   picc  and iv ab  for 6 weeks, per  ID, on  last  visit  s/p rrt   for hypoxia. cxr with complete  collapsed  of  left lung, needing broch, by  [facility]   s/ p  bronchoscopy , pt  with  tracheomalacia  and  collapsed  airways,  makes  this a  difficult  situation, as there is no good rx for this          *  p/w   sob. acute  resp failure  on Bipap,  from left   lung  collapse  afberile,  with normal  wbc  on arrival  *   s/p cva, has  PEG,  npo  ,  on  synthroid, Keppra  ,  *  HTN, on meds  per  card  *  h/o ca breast. /, s/p  R  mastectomy  * obesity, bmi  was   43, now  is  36 in  er  left  leg contracture ,  remains  bed  bound. functional  paraplegias  needs  assistance  for  all her  adl's  *   h/o bacteremia. on 12/19/21,  Staph epi, meth R,  from perforated  appendicitis  ct  c/a/p, from last  visit   pna, perforated  appendicitis,  ?  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 this will not alter rx. pt  will need   extended  course  of ab     on iv  vanco and ertapenem.  till  2/9/.22  ID dr reagan, and per  surg  and  IR  eval,  no intervnetion  CT  1/20/22, no PE. collection in right side   on Proventil,  Mucomyst and   saline  nebs.   * pt  with  h/o  tracheomalacia  and  collapsed  airways,  makes  this a  difficult  situation, as there is no good rx for this  given pt;s  mlple co morbidities,  pt is  at risk for  re admissions  picc  line/  on nocturnal  awaps  difficult  situation, a s there  is no  good  rx  for  pt's  recurrent lung collapse hypoxia  on n/ [place] oxygen/  when her O2  needs  are less, then can start  d/c  planning to rehab    norvasc stopped/ on lopressor/  on  oxygen  on  n/c   now.  may start   d/c planning to  rehab    has  been  cleared  by  pulm.  and  re  admission  likely,, given her  airways, and  propensity  for  lung collapse   start  d/c  planning       per  son, pt is  full code     rad< from: CT Angio Chest PE Protocol w/ IV Cont (01.20.22 @ 10:35) >  IMPRESSION:  Limited exam for pulmonary embolism; no central pulmonary embolism with   nondiagnostic evaluation of lobar, segmental and subsegmental branches.   No imaging evidence of right heart strain.  Complete lingular and left lower lobe collapse withsuperimposed   bronchial impaction. Small left pleural effusion. Mild patchy right   apical groundglass may be related to pulmonary edema, infection or   inflammation.  Multilocular 4.0 x 2.2 cm right lower quadrant fluid collection in the   regionof previously perforated appendix, status post drain removal.   Periappendiceal fat stranding has largely resolved.  Left PICC crosses midline and terminates in the right innominate vein,   likely repositioned during the administration of contrast between    imaging and CTA acquisition.  Cholelithiasis and choledocholithiasis. No intrahepatic biliary ductal   dilatation.  --- End of Report ---  < end of copied text >

## 2022-02-02 NOTE — PROGRESS NOTE ADULT - SUBJECTIVE AND OBJECTIVE BOX
[facility] - PROGRESS NOTE    CHIEF COMPLAINT: acute hypoxic respiratory failure; mucous plugging; left lung atelectasis; weak cough; dysphagia; tracheobronchomalacia; PEG;     INTERVAL HISTORY: much more awake and alert; no shortness of breath or hypoxemia on a nasal canula @ 4lpm; wore AVAPS overnight; cough with difficulty expectorating sputum; no hemoptysis, chest congestion or wheeze; no fevers, chills or sweats; no chest pain/pressure or palpitations; bedbound; CT scan  -> mild progression of left lower lobe collapse, lingular collapse and partial left upper lobe collapse;    REVIEW OF SYSTEMS:  Constitutional: As per interval history  HEENT: Within normal limits  CV: As per interval history  Resp: As per interval history  GI: dysphagia -> PEG  : Within normal limits  Musculoskeletal: Within normal limits  Skin: Within normal limits  Neurological: CVA - > left sided weakness/plegia  Psychiatric: Within normal limits  Endocrine: Within normal limits  Hematologic/Lymphatic: Within normal limits  Allergic/Immunologic: Within normal limits    MEDICATIONS:     Pulmonary "  acetylcysteine 20%  Inhalation 4 milliLiter(s) Inhalation three times a day  albuterol/ipratropium for Nebulization 3 milliLiter(s) Nebulizer every 6 hours  guaiFENesin Oral Liquid (Sugar-Free) 300 milliGRAM(s) Oral every 6 hours  sodium chloride 3%  Inhalation 4 milliLiter(s) Inhalation every 6 hours    Anti-microbials:  ertapenem  IVPB 1000 milliGRAM(s) IV Intermittent every 24 hours  vancomycin  IVPB 750 milliGRAM(s) IV Intermittent every 24 hours    Cardiovascular:  metoprolol tartrate 25 milliGRAM(s) Oral two times a day    Other:  allopurinol 100 milliGRAM(s) Oral daily  artificial  tears Solution 1 Drop(s) Both EYES three times a day  atorvastatin 20 milliGRAM(s) Oral at bedtime  chlorhexidine 2% Cloths 1 Application(s) Topical <User Schedule>  heparin   Injectable 5000 Unit(s) SubCutaneous every 8 hours  levETIRAcetam  Solution 750 milliGRAM(s) Oral two times a day  levothyroxine 75 MICROGram(s) Oral daily  nystatin Powder 1 Application(s) Topical two times a day    MEDICATIONS  (PRN):  acetaminophen    Suspension .. 650 milliGRAM(s) Oral every 6 hours PRN Temp greater or equal to 38C (100.4F), Mild Pain (1 - 3)      OBJECTIVE:     Daily Weight in k (2022 05:10)    PHYSICAL EXAM:       ICU Vital Signs Last 24 Hrs  T(C): 36.7 (2022 05:10), Max: 36.7 (2022 05:10)  T(F): 98 (2022 05:10), Max: 98 (2022 05:10)  HR: 92 (2022 05:10) (70 - 97)  BP: 110/73 (2022 05:10) (90/53 - 110/73)  BP(mean): 64 (2022 12:51) (64 - 64)  ABP: --  ABP(mean): --  RR: 20 (2022 05:10) (20 - 20)  SpO2: 96% (2022 05:10) (92% - 98%) on 4lpm nasal canula     General: Awake. Alert. Cooperative. No distress Appears stated age.	  HEENT: Atraumatic. Normocephalic. Anicteric. Normal oral mucosa. PERRL. EOMI. Mallampati class IV airway.  Neck: Supple. Trachea midline. Thyroid without enlargement/tenderness/nodules. No carotid bruit. No JVD. Short and wide  Cardiovascular: Regular rate and rhythm. S1 S2 normal. III/VI systolic murmur  Respiratory: Respirations unlabored. Decreased breath sounds left hemithorax. Mild wheeze. No curvature.  Abdomen: Soft. Non-tender. Non-distended. No organomegaly. No masses. Normal bowel sounds. Obese, PEG.  Extremities: Warm to touch. No clubbing or cyanosis. No pedal edema. LUE PICC line.  Pulses: 2+ peripheral pulses all extremities.	  Skin: Normal skin color. No rashes or lesions. No ecchymoses. No cyanosis. Warm to touch.  Lymph Nodes: Cervical, supraclavicular and axillary nodes normal  Neurological: Left lower extremity plegia with contraction. Left upper extremity is quite weak. A and O x 3  Psychiatry: Calm    LABS:                          9.2    5.38  )-----------( 204      ( 2022 07:19 )             30.8     CBC    WBC  5.38 <==, 4.93 <==, 5.94 <==, 6.44 <==    Hemoglobin  9.2 <<==, 9.1 <<==, 9.6 <<==, 9.4 <<==    Hematocrit  30.8 <==, 31.0 <==, 32.6 <==, 30.2 <==    Platelets  204 <==, 193 <==, 195 <==, 192 <==      145  |  107  |  23  ----------------------------<  132<H>    01-29  4.5   |  24  |  0.68      LYTES    sodium  145 <==, 144 <==, 142 <==    potassium   4.5 <==, 4.2 <==, 4.6 <==    chloride  107 <==, 106 <==, 107 <==    carbon dioxide  24 <==, 23 <==, 22 <==    =============================================================================================  RENAL FUNCTION:    Creatinine:   0.68  <<==, 0.65  <<==, 0.63  <<==    BUN:   23 <==, 24 <==, 21 <==    ============================================================================================    calcium   9.6 <==, 9.6 <==, 9.3 <==    ============================================================================================  LFTs    AST:   16 <==     ALT:  13  <==     AP:  110  <=    Bili:  0.3  <=    Venous Blood Gas:   @ 08:53  7.34/55/52/30/83.0  VBG Lactate: 1.7    Serum Pro-Brain Natriuretic Peptide: 148 pg/mL ( @ 08:53)    CARDIAC MARKERS ( 2022 08:53 )  CPK x     /CKMB x     /CKMB Units x        troponin 21 ng/L    < from: Transthoracic Echocardiogram (21 @ 14:39) >    Patient name: FRANKI MEHTA  YOB: 1935   Age: 86 (F)   MR#: [id number]  Study Date: 2021  Location: [address] [place], [place] [id number]: [facility]  Study quality: Technically difficult/Limited  Referring Physician: Edmond Almazan MD  Blood Pressure: 134/77 mmHg  Height: 163 cm  Weight: 109 kg  BSA: 2.1 m2  Heart Rate: 105 mmHg  ------------------------------------------------------------------------  PROCEDURE: Transthoracic echocardiogram with 2-D, M-Mode  and complete spectral andcolor flow Doppler.  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 No segmental  wall motion abnormalities.  Moderate aortic stenosis.  There may be a vegetation on the left or non-coronary cusp  of the aortic valve. Consider transesophageal  echocardiography.  ------------------------------------------------------------------------  Confirmed on  2021 - 10:32:40 by Rahul Correa M.D.  ------------------------------------------------------------------------    MICROBIOLOGY:     Respiratory Viral Panel with COVID-19 by RYAN (22 @ 08:52)   Rapid RVP Result: [facility]   [facility]    Urinalysis Basic - ( 2022 14:56 )    Color: Yellow / Appearance: Slightly Turbid / SG: >1.050 / pH: x  Gluc: x / Ketone: Negative  / Bili: Negative / Urobili: Negative   Blood: x / Protein: 100 / Nitrite: Negative   Leuk Esterase: Large / RBC: 7 /hpf / WBC 25 /HPF   Sq Epi: x / Non Sq Epi: 5 /hpf / Bacteria: Few    Culture - Urine (22 @ 16:37)   Culture Results:   >100,000 CFU/ml Enterococcus faecium (vancomycin resistant)   <10,000 CFU/ml Normal Urogenital ck present     Culture - Bronchial (22 @ 18:48)   Culture Results:   Normal Respiratory Ck present     Culture - Abscess with Gram Stain (21 @ 18:49)   Specimen Source: .Abscess abdominal abscess   Culture Results:   Few Escherichia coli   Few Morganella morganii   Moderate Enterococcus avium   Numerous Bacteroides thetaiotamcron group "Susceptibilities not performed"   Numerous Clostridium ramosum "Susceptibilities not performed"     Culture - Blood (21 @ 14:24)   Culture Results:   Growth in aerobic bottle: Staphylococcus epidermidis   Organism Identification: Staphylococcus epidermidis     Culture - Blood (21 @ 14:24)   Gram Stain:   Growth in aerobic bottle: Gram Positive Cocci in Clusters   Growth in anaerobic bottle: Gram Positive Cocci in Clusters   - Staphylococcus epidermidis, Methicillin resistant: Detec     RADIOLOGY:  [x] Chest radiographs reviewed and interpreted by me    EXAM:  CT CHEST                          PROCEDURE DATE:  2022      FINDINGS:    LYMPH NODES: No mediastinal lymphadenopathy.    HEART/VASCULATURE: Heart size is normal. No pericardial effusion. Aortic   and mitral valve calcification. Partially visualized left PICC line   catheter with the tip in the SVC.    AIRWAYS/LUNGS/PLEURA: Complete collapse of the left lower and lingular   lobes with partial collapse of the left upper lobe with bronchial   impaction, progressed compared to prior. There is leftward mediastinal   shift.  Mosaic attenuation of the left upper lobe.  Patchy groundglass opacities at the right apex, similar to prior.  Dependent right lung subsegmental atelectasis.  Stable trace left pleural effusion with intrafissural extension.    UPPER ABDOMEN: Calcified hepatic granuloma.    BONES/SOFT TISSUES: Status post right mastectomy. Status post left   humerus ORIF.    IMPRESSION:    Persistent left lower lobe collapse, lingular collapse and partial left   upper lobe collapse when compared with previous exam. There is mild   progression since the previous study.      Stable patchy groundglass opacities of the right lung apex, suggestive of   infection versus inflammation.    Remaining incidental findings as above.    SHERLEY WATSON MD; Resident Radiologist  This document has been electronically signed.  FAUSTINO PIZANO MD; Attending Radiologist  This document has been electronically signed. 2022  4:36PM  ---------------------------------------------------------------------------------------------------------------   EXAM:  XR CHEST PORTABLE URGENT 1V                          PROCEDURE DATE:  2022      INTERPRETATION:  EXAMINATION: XR CHEST URGENT    CLINICAL INDICATION: Mucus plugging, left lung atelectasis.    TECHNIQUE: Single frontal, portable view of the chest was obtained.    COMPARISON: Chest radiograph 2022.    FINDINGS:    Left PICC tip within SVC. Status post ORIF of the left humerus.  Cannot accurately assess cardiac size in this projection.  Left lower lung consolidation and volume loss is unchanged.  Left pleural effusion. No pneumothorax.    IMPRESSION:  Left lower lung consolidation and volume loss is unchanged.    MOUSTAPHA COURTNEY MD; Resident Radiology  This document has been electronically signed.  GIANNI MANTILLA MD; Attending Radiologist  This document has been electronically signed. 2022  6:32PM  ---------------------------------------------------------------------------------------------------------------  EXAM:  CT ABDOMEN AND PELVIS IC                        [facility]                          PROCEDURE DATE:  2022      FINDINGS:    CHEST:    PULMONARY ANGIOGRAM: Limited study secondary to extensive respiratory   motion artifact. No evidence of large central pulmonary embolism, with   limited evaluation of lobar, segmental and subsegmental branches. The   pulmonary artery is enlarged measuring 3.3 cm, nonspecific although can   be seen with pulmonary hypertension.    MEDIASTINUM/LYMPH NODES: No mediastinal or hilar lymphadenopathy. Small   hiatal hernia.    AIRWAYS/LUNGS/PLEURA: Degraded by respiratory motion artifact. There is   partial left upper lobe and complete lingular and left lower lobe   collapse with superimposed bronchial impaction. There is a small left   pleural effusion with intrafissural extension. There are patchy   groundglass opacities at the right apex, likely infectious/inflammatory.   No pneumothorax. Mild subpleural reticulation along the right middle lobe   may be secondary to right breast/chest wall radiotherapy.    HEART/VASCULATURE: Heart size is normal. No pericardial effusion. Mild   aortic valvular and mitral annular calcification. A left PICC is present   which has repositioned between the acquisition of the  imaging   (terminating in the SVC on ) and the chest CTA, now crossing midline   into the right innominate vein.    CHEST WALL AND LOWER NECK: The thyroid is incompletely characterized on   this study. Status post right mastectomy.    ABDOMEN AND PELVIS:    LIVER: Within normal limits. Calcified hepatic granuloma.    BILE DUCTS: No intrahepatic biliary ductal dilatation. Redemonstration of   choledocholithiasis in the ampullary region, unchanged since 2021.    GALLBLADDER: Cholelithiasis.    SPLEEN: Within normal limits.    PANCREAS: 1 cm pancreatic tail cyst.    ADRENALS: Within normal limits.    KIDNEYS/URETERS: Mildly atrophic left kidney. No hydronephrosis.    BLADDER: Within normal limits.    REPRODUCTIVE ORGANS: Uterus present. There is a 1.4 cm right adnexal cyst.    BOWEL: Percutaneous gastrostomy tube present with intraluminal balloon.   No bowel obstruction. Colonic diverticulosis without diverticulitis.   Interval removal of the right lower quadrant pigtail catheter in the   region of the appendix. There is a residual, multilocular 4.0 x 2.2 cm   collection with peripheral enhancement (series 3, image 84) in the region   of previously perforated appendix. Previously seen periappendiceal fat   stranding has largely resolved.    PERITONEUM: No ascites.    VESSELS: Aortic calcifications. Hypodense lesion noted at the origin of   the SMA which was previously seen but not as well appreciated on prior   noncontrast enhanced study on 2021. SMA however appears patent.    RETROPERITONEUM/LYMPH NODES: No lymphadenopathy.    ABDOMINAL WALL: . Fat-containing umbilical hernia.    BONES: Remote left proximal humeral fracture status post ORIF. Unchanged   T11 and L1 superior endplate compression deformities.    IMPRESSION:  Limited exam for pulmonary embolism; no central pulmonary embolism with   nondiagnostic evaluation of lobar, segmental and subsegmental branches.   No imaging evidence of right heart strain.    Complete lingular and left lower lobe collapse withsuperimposed   bronchial impaction. Small left pleural effusion. Mild patchy right   apical groundglass may be related to pulmonary edema, infection or   inflammation.    Multilocular 4.0 x 2.2 cm right lower quadrant fluid collection in the   regionof previously perforated appendix, status post drain removal.   Periappendiceal fat stranding has largely resolved.    Left PICC crosses midline and terminates in the right innominate vein,   likely repositioned during the administration of contrast between    imaging and CTA acquisition.    Cholelithiasis and choledocholithiasis. No intrahepatic biliary ductal   dilatation.    MARKO GUAMAN DO; Resident Radiologist  This document has been electronically signed.  SHARATH TURCIOS M.D., Attending Radiologist  This document has been electronically signed. 2022 12:48PM  ---------------------------------------------------------------------------------------------------------------  EXAM:  CT BRAIN                          PROCEDURE DATE:  2022      FINDINGS:    Study is limited by motion.    No hydrocephalus, midline shift, or effacement of the basal cisterns. No   gross evidence for acute intracranial hemorrhage or brain edema.    Complete opacification of the right maxillary sinus and right mastoid air   cells.    IMPRESSION:    Limited by motion.    No gross evidence for acute intracranial hemorrhage or brain edema.    No hydrocephalus or midline shift.    Complete opacification of the right maxillary sinus and right mastoid air   cells. Correlate for the presence of sinusitis/mastoiditis.    LESLI KING MD; Attending Radiologist  This document has been electronically signed. 2022 10:38AM  ---------------------------------------------------------------------------------------------------------------

## 2022-02-02 NOTE — PROGRESS NOTE ADULT - ASSESSMENT
ASSESSMENT:    86 year old gentlewoman, lifelong non-smoker, without history of intrinsic lung disease. 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 She also has a history of HTN, HLD, CAD s/p MI with preserved LVEF and moderate aortic stenosis. The patient was admitted to [facility] on December 19th 2021 with fever and abdominal pain. She was found to have perforated appendicitis with abscess formation. She was treated with tube drainage and antibiotics for a polymicrobial infection. She continues on vancomycin/ertapenem via a PICC line. Admission CT had debris within the left lobar and more distal airways of the left lower lobe as well as some debris within the right lower lobe airway - there was complete atelectasis of the left lower lobe and subsegmental atelectatic changes within the left upper lobe and dependent portions of the right lung. The patient developed increased work of breathing, tachycardia, tachypnea and hypoxemia on January 4th due to mucous plugging with complete collapse of the left lung. She underwent bronchoscopy on January 6th with removal of copious amounts of purulent secretions from throughout the bronchial tree - there was severe tracheobronchomalacia. Bronchial cultures were negative. She was treated with bronchodilators, mucolytic nebs, chest vest and nocturnal AVAPS with expansion of the left upper lobe and some reexpansion of the left lower lobe. She was discharged to rehab on January 11th on a 3lpm nasal canula with plans to continue medical and mechanical therapy as well as nocturnal BIPAP. The patient returns from rehab with decreased mental status, dyspnea and hypoxemia treated with 100% NRB. She currently is obtunded on a BIPAP device. CT scan -> partial left upper lobe and complete lingular and left lower lobe collapse with superimposed bronchial impaction - small left pleural effusion with intrafissural extension - patchy groundglass opacities at the right apex. The patient has redeveloped left lung atelectasis due to mucous plugging -> multifactorial. There is an area of inflammation/infection in the right upper lobe  1) tracheobronchomalacia with marked expiratory airway narrowing preventing adequate sputum clearance  2) weak cough following a CVA with marked deconditioning   3) dysphagia with ongoing aspiration of oral secretions  4) moderate aortic stenosis with a small left pleural effusion    2/1 - much more awake and alert; no shortness of breath or hypoxemia on a nasal canula @ 4lpm; wore AVAPS overnight; cough with difficulty expectorating sputum; no hemoptysis, chest congestion or wheeze; no fevers, chills or sweats; no chest pain/pressure or palpitations; bedbound;     PLAN/RECOMMENDATIONS:    stable oxygenation a 4lpm nasal canula  chest CT 1/31 -> mild progression of left lower lobe collapse, lingular collapse and partial left upper lobe collapse  bronchoscopy cannot be safely be performed due to the patient's tenuous respiratory status - she would unlikely be able to be "liberated" from a ventilator if intubated - she would unlikely benefit long term from a repeat bronchoscopy  nocturnal AVAPS to facilitate lung expansion  s/p bronchoscopy 1/6 with removal of copious amounts of purulent mucous from throughout the airways - there was severe tracheobronchomalacia -> cultures are negative  continues on a prolonged course of vancomycin/ertapenem  albuterol/atrovent nebs q6h  hypertonic saline and mucomyst nebs to facilitate mucous clearance  guaifenesin via PEG  chest vest/manual chest PT  cough assist device  cardiac meds: lopressor/lipitor  DVT prophylaxis - SQ heparin  allopurinol/keppra/synthroid  glucose control  abdominal/pelvic CT - percutaneous gastrostomy tube present with intraluminal balloon - colonic diverticulosis without diverticulitis - interval removal of the right lower quadrant pigtail catheter in the region of the appendix - there is a residual, multilocular 4.0 x 2.2 cm collection with peripheral enhancement in the region of the previously perforated appendix - periappendiceal fat stranding has largely resolved - ID/IR/surgery evaluations noted - no intervention is needed    I am not hopeful that the left lung will expand due to the multiple issues delineated above - suspect that she will need to be treated with oxygen supplementation to keep saturation greater than 90% accepting the fact that her left lung will be basically non-functional    Will follow with you. Plan of care discussed with the patient at bedside, the dedicated floor NP and with Dr. Segundo. Discharge planning    Kennedy Marcano MD, [facility]  [phone]  Pulmonary Medicine

## 2022-02-02 NOTE — PROGRESS NOTE ADULT - SUBJECTIVE AND OBJECTIVE BOX
CARDIOLOGY     PROGRESS  NOTE   ________________________________________________    CHIEF COMPLAINT:Patient is a 86y old  Female who presents with a chief complaint of sob (02 Feb 2022 08:03)  no complain.  	  REVIEW OF SYSTEMS:  CONSTITUTIONAL: No fever, weight loss, or fatigue  EYES: No eye pain, visual disturbances, or discharge  ENT:  No difficulty hearing, tinnitus, vertigo; No sinus or throat pain  NECK: No pain or stiffness  RESPIRATORY: No cough, wheezing, chills or hemoptysis; No Shortness of Breath  CARDIOVASCULAR: No chest pain, palpitations, passing out, dizziness, or leg swelling  GASTROINTESTINAL: No abdominal or epigastric pain. No nausea, vomiting, or hematemesis; No diarrhea or constipation. No melena or hematochezia.  GENITOURINARY: No dysuria, frequency, hematuria, or incontinence  NEUROLOGICAL: No headaches, memory loss, loss of strength, numbness, or tremors  SKIN: No itching, burning, rashes, or lesions   LYMPH Nodes: No enlarged glands  ENDOCRINE: No heat or cold intolerance; No hair loss  MUSCULOSKELETAL: No joint pain or swelling; No muscle, back, or extremity pain  PSYCHIATRIC: No depression, anxiety, mood swings, or difficulty sleeping  HEME/LYMPH: No easy bruising, or bleeding gums  ALLERGY AND IMMUNOLOGIC: No hives or eczema	    [ ] All others negative	  [ ] Unable to obtain    PHYSICAL EXAM:  T(C): 36.7 (02-02-22 @ 05:10), Max: 36.7 (02-02-22 @ 05:10)  HR: 92 (02-02-22 @ 05:10) (70 - 97)  BP: 110/73 (02-02-22 @ 05:10) (90/53 - 110/73)  RR: 20 (02-02-22 @ 05:10) (20 - 20)  SpO2: 96% (02-02-22 @ 05:10) (92% - 98%)  Wt(kg): --  I&O's Summary    01 Feb 2022 07:01  -  02 Feb 2022 07:00  --------------------------------------------------------  IN: 0 mL / OUT: 450 mL / NET: -450 mL        Appearance: Normal	  HEENT:   Normal oral mucosa, PERRL, EOMI	  Lymphatic: No lymphadenopathy  Cardiovascular: Normal S1 S2, No JVD, + murmurs, No edema  Respiratory:rhonchi  Psychiatry: A & O x 3, Mood & affect appropriate  Gastrointestinal:  Soft, Non-tender, + BS	  Skin: No rashes, No ecchymoses, No cyanosis	  Neurologic: Non-focal  Extremities: Normal range of motion, No clubbing, cyanosis or edema  Vascular: Peripheral pulses palpable 2+ bilaterally    MEDICATIONS  (STANDING):  acetylcysteine 20%  Inhalation 4 milliLiter(s) Inhalation three times a day  albuterol/ipratropium for Nebulization 3 milliLiter(s) Nebulizer every 6 hours  allopurinol 100 milliGRAM(s) Oral daily  artificial  tears Solution 1 Drop(s) Both EYES three times a day  atorvastatin 20 milliGRAM(s) Oral at bedtime  chlorhexidine 2% Cloths 1 Application(s) Topical <User Schedule>  ertapenem  IVPB 1000 milliGRAM(s) IV Intermittent every 24 hours  guaiFENesin Oral Liquid (Sugar-Free) 300 milliGRAM(s) Oral every 6 hours  heparin   Injectable 5000 Unit(s) SubCutaneous every 8 hours  levETIRAcetam  Solution 750 milliGRAM(s) Oral two times a day  levothyroxine 75 MICROGram(s) Oral daily  metoprolol tartrate 25 milliGRAM(s) Oral two times a day  nystatin Powder 1 Application(s) Topical two times a day  sodium chloride 3%  Inhalation 4 milliLiter(s) Inhalation every 6 hours  vancomycin  IVPB 750 milliGRAM(s) IV Intermittent every 24 hours      TELEMETRY: 	    ECG:  	  RADIOLOGY:  OTHER: 	  	  LABS:	 	    CARDIAC MARKERS:                                9.2    5.38  )-----------( 204      ( 01 Feb 2022 07:19 )             30.8           proBNP: Serum Pro-Brain Natriuretic Peptide: 148 pg/mL (01-20 @ 08:53)    Lipid Profile:   HgA1c:   TSH: Thyroid Stimulating Hormone, Serum: 2.39 uIU/mL (01-07 @ 09:13)    stable oxygenation a 5lpm nasal canula  chest CT 1/31 -> mild progression of left lower lobe collapse, lingular collapse and partial left upper lobe collapse  bronchoscopy cannot be performed due to the patient's tenuous respiratory status - she would unlikely be able to be "liberated" from a ventilator if intubated - she would unlikely benefit long term from a repeat bronchoscopy  nocturnal AVAPS to facilitate lung expansion  s/p bronchoscopy 1/6 with removal of copious amounts of purulent mucous from throughout the airways - there was severe tracheobronchomalacia -> cultures are negative  continues on a prolonged course of vancomycin/ertapenem  albuterol/atrovent nebs q6h  hypertonic saline and mucomyst nebs to facilitate mucous clearance  guaifenesin via PEG  chest vest/manual chest PT  cough assist device  cardiac meds: lopressor/lipitor  DVT prophylaxis - SQ heparin  allopurinol/keppra/synthroid  glucose control  abdominal/pelvic CT - percutaneous gastrostomy tube present with intraluminal balloon - colonic diverticulosis without diverticulitis - interval removal of the right lower quadrant pigtail catheter in the region of the appendix - there is a residual, multilocular 4.0 x 2.2 cm collection with peripheral enhancement in the region of the previously perforated appendix - periappendiceal fat stranding has largely resolved - ID/IR/surgery evaluations noted - no intervention is needed      Assessment and plan  ---------------------------  85 y/o female, hx of CVA, MI, breast CA, COPD, not on home 02, s/p  recent  perforated  appendix / abscess,  HN, HLD, hypothyroid,  obesity relatively  bed bound , gout   BIBEMS from [facility] due to SOB., started this morning   patient initial 02 sat was 84% placed on NRB and improved to 93%.  has a pic line to left arm ,  and  peg tube  pt had a long course of admission sec to mucus plug and collapsed lung, s/p bronchoscopy.  ct angio of the chest noted, sig abnormality with collapse of the lung  small pleural effusion, doubt chf  agree with iv abx  dvt prophylaxis  pulmonary noted  ecg noted with old inferior wall mi, no acute changes  continue current meds  continue current bp meds  echo noted  will increase beta blocker as tolerated  remained in NSR   sob sec to underlying lung disease  dvt prophylaxis  continue abx, fu blood cultures  pulmonary noted  repeat blood work  ct chest noted , ID/ pulmonary comments  short run of PAT continue beta blocker

## 2022-02-02 NOTE — PROGRESS NOTE ADULT - ASSESSMENT
87 y/o female, hx of CVA, MI, breast CA, COPD, not on home 02, s/p recent  perforated  appendox/ abscess,  HN, HLD, hypothyroid,  obexity, relatively  be d boind, , gout BIBEMS from [facility] due to SOB., started this morning patient initial 02 sat was 84% placed on NRB and improved to 93 with recent perforated appendicitis, possible IE on long term iv abx     John Fragoso  Attending Physician   Division of Infectious Disease  Pager #[phone]  Available on [employer] Teams also  After 5pm/weekend or no response, call #[phone]

## 2022-02-02 NOTE — PHYSICAL THERAPY INITIAL EVALUATION ADULT - PERTINENT HX OF CURRENT PROBLEM, REHAB EVAL
85 y/o female, hx of CVA, MI, breast CA, COPD, not on home 02, s/p recent  perforated  appendox/ abscess,  HN, HLD, hypothyroid,  obexity, relatively  be d boind, , gout BIBEMS from [facility] due to SOB., started this morning patient initial 02 sat was 84% placed on NRB and improved to 93 with recent perforated appendicitis, possible IE on long term iv abx. contd below:

## 2022-02-03 NOTE — PROGRESS NOTE ADULT - SUBJECTIVE AND OBJECTIVE BOX
CARDIOLOGY     PROGRESS  NOTE   ________________________________________________    CHIEF COMPLAINT:Patient is a 86y old  Female who presents with a chief complaint of sob (02 Feb 2022 09:55)  doing better.  	  REVIEW OF SYSTEMS:  CONSTITUTIONAL: No fever, weight loss, or fatigue  EYES: No eye pain, visual disturbances, or discharge  ENT:  No difficulty hearing, tinnitus, vertigo; No sinus or throat pain  NECK: No pain or stiffness  RESPIRATORY: No cough, wheezing, chills or hemoptysis; + Shortness of Breath  CARDIOVASCULAR: No chest pain, palpitations, passing out, dizziness, or leg swelling  GASTROINTESTINAL: No abdominal or epigastric pain. No nausea, vomiting, or hematemesis; No diarrhea or constipation. No melena or hematochezia.  GENITOURINARY: No dysuria, frequency, hematuria, or incontinence  NEUROLOGICAL: No headaches, memory loss, loss of strength, numbness, or tremors  SKIN: No itching, burning, rashes, or lesions   LYMPH Nodes: No enlarged glands  ENDOCRINE: No heat or cold intolerance; No hair loss  MUSCULOSKELETAL: No joint pain or swelling; No muscle, back, or extremity pain  PSYCHIATRIC: No depression, anxiety, mood swings, or difficulty sleeping  HEME/LYMPH: No easy bruising, or bleeding gums  ALLERGY AND IMMUNOLOGIC: No hives or eczema	    [ ] All others negative	  [ ] Unable to obtain    PHYSICAL EXAM:  T(C): 36.7 (02-03-22 @ 03:41), Max: 36.7 (02-03-22 @ 03:41)  HR: 86 (02-03-22 @ 05:47) (69 - 90)  BP: 95/55 (02-03-22 @ 03:41) (95/54 - 96/42)  RR: 18 (02-03-22 @ 03:41) (18 - 19)  SpO2: 96% (02-03-22 @ 05:47) (95% - 99%)  Wt(kg): --  I&O's Summary    02 Feb 2022 07:01  -  03 Feb 2022 07:00  --------------------------------------------------------  IN: 1590 mL / OUT: 600 mL / NET: 990 mL        Appearance: Normal	  HEENT:   Normal oral mucosa, PERRL, EOMI	  Lymphatic: No lymphadenopathy  Cardiovascular: Normal S1 S2, No JVD, + murmurs, No edema  Respiratory: rhonchi, decrease bs  Psychiatry: A & O x 3, Mood & affect appropriate  Gastrointestinal:  Soft, Non-tender, + BS	  Skin: No rashes, No ecchymoses, No cyanosis	  Neurologic: Non-focal  Extremities: Normal range of motion, No clubbing, cyanosis or edema  Vascular: Peripheral pulses palpable 2+ bilaterally    MEDICATIONS  (STANDING):  acetylcysteine 20%  Inhalation 4 milliLiter(s) Inhalation three times a day  albuterol/ipratropium for Nebulization 3 milliLiter(s) Nebulizer every 6 hours  allopurinol 100 milliGRAM(s) Oral daily  artificial  tears Solution 1 Drop(s) Both EYES three times a day  atorvastatin 20 milliGRAM(s) Oral at bedtime  chlorhexidine 2% Cloths 1 Application(s) Topical <User Schedule>  ertapenem  IVPB 1000 milliGRAM(s) IV Intermittent every 24 hours  guaiFENesin Oral Liquid (Sugar-Free) 300 milliGRAM(s) Oral every 6 hours  heparin   Injectable 5000 Unit(s) SubCutaneous every 8 hours  levETIRAcetam  Solution 750 milliGRAM(s) Oral two times a day  levothyroxine 75 MICROGram(s) Oral daily  metoprolol tartrate 25 milliGRAM(s) Oral two times a day  nystatin Powder 1 Application(s) Topical two times a day  sodium chloride 3%  Inhalation 4 milliLiter(s) Inhalation every 6 hours  vancomycin  IVPB 750 milliGRAM(s) IV Intermittent every 24 hours      TELEMETRY: 	    ECG:  	  RADIOLOGY:  OTHER: 	  	  LABS:	 	    CARDIAC MARKERS:                  proBNP: Serum Pro-Brain Natriuretic Peptide: 148 pg/mL (01-20 @ 08:53)    Lipid Profile:   HgA1c:   TSH: Thyroid Stimulating Hormone, Serum: 2.39 uIU/mL (01-07 @ 09:13)    I am not hopeful that the left lung will expand due to the multiple issues delineated above - suspect that she will need to be treated with oxygen supplementation to keep saturation greater than 90% accepting the fact that her left lung will be basically non-functional      Assessment and plan  ---------------------------  85 y/o female, hx of CVA, MI, breast CA, COPD, not on home 02, s/p  recent  perforated  appendix / abscess,  HN, HLD, hypothyroid,  obesity relatively  bed bound , gout   BIBEMS from [facility] due to SOB., started this morning   patient initial 02 sat was 84% placed on NRB and improved to 93%.  has a pic line to left arm ,  and  peg tube  pt had a long course of admission sec to mucus plug and collapsed lung, s/p bronchoscopy.  ct angio of the chest noted, sig abnormality with collapse of the lung  small pleural effusion, doubt chf  agree with iv abx  dvt prophylaxis  pulmonary noted  ecg noted with old inferior wall mi, no acute changes  continue current meds  continue current bp meds  echo noted  remained in NSR   sob sec to underlying lung disease  dvt prophylaxis  continue abx, fu blood cultures  pulmonary noted  repeat blood work  ct chest noted , ID/ pulmonary comments  pt with intermittent runs of pat , off beta blocker sec to decrease bp, add free water via the peg

## 2022-02-03 NOTE — PROGRESS NOTE ADULT - SUBJECTIVE AND OBJECTIVE BOX
[facility] - PROGRESS NOTE    CHIEF COMPLAINT: acute hypoxic respiratory failure; mucous plugging; left lung atelectasis; weak cough; dysphagia; tracheobronchomalacia; PEG;     INTERVAL HISTORY: in isolation due to contact with staff member with COVID; awakening slowly and with confusion - becomes more awake and alert as the day progresses; no shortness of breath or hypoxemia on a nasal canula @ 4lpm; wore AVAPS overnight; cough with difficulty expectorating sputum; no hemoptysis, chest congestion or wheeze; no fevers, chills or sweats; no chest pain/pressure or palpitations; bedbound; CT scan 1/31 -> mild progression of left lower lobe collapse, lingular collapse and partial left upper lobe collapse;    REVIEW OF SYSTEMS:  Constitutional: As per interval history  HEENT: Within normal limits  CV: As per interval history  Resp: As per interval history  GI: dysphagia -> PEG  : Within normal limits  Musculoskeletal: Within normal limits  Skin: Within normal limits  Neurological: CVA - > left sided weakness/plegia  Psychiatric: Within normal limits  Endocrine: Within normal limits  Hematologic/Lymphatic: Within normal limits  Allergic/Immunologic: Within normal limits    MEDICATIONS:     Pulmonary "  acetylcysteine 20%  Inhalation 4 milliLiter(s) Inhalation three times a day  albuterol/ipratropium for Nebulization 3 milliLiter(s) Nebulizer every 6 hours  guaiFENesin Oral Liquid (Sugar-Free) 300 milliGRAM(s) Oral every 6 hours  sodium chloride 3%  Inhalation 4 milliLiter(s) Inhalation every 6 hours    Anti-microbials:  ertapenem  IVPB 1000 milliGRAM(s) IV Intermittent every 24 hours  vancomycin  IVPB 750 milliGRAM(s) IV Intermittent every 24 hours    Cardiovascular:  metoprolol tartrate 25 milliGRAM(s) Oral two times a day    Other:  allopurinol 100 milliGRAM(s) Oral daily  artificial  tears Solution 1 Drop(s) Both EYES three times a day  atorvastatin 20 milliGRAM(s) Oral at bedtime  chlorhexidine 2% Cloths 1 Application(s) Topical <User Schedule>  heparin   Injectable 5000 Unit(s) SubCutaneous every 8 hours  levETIRAcetam  Solution 750 milliGRAM(s) Oral two times a day  levothyroxine 75 MICROGram(s) Oral daily  nystatin Powder 1 Application(s) Topical two times a day    MEDICATIONS  (PRN):  acetaminophen    Suspension .. 650 milliGRAM(s) Oral every 6 hours PRN Temp greater or equal to 38C (100.4F), Mild Pain (1 - 3)    OBJECTIVE:    I&O's Detail    02 Feb 2022 07:01  -  03 Feb 2022 07:00  --------------------------------------------------------  IN:    Enteral Tube Flush: 290 mL    IV PiggyBack: 300 mL    Jevity 1.2: 1000 mL  Total IN: 1590 mL    OUT:    Voided (mL): 600 mL  Total OUT: 600 mL    Total NET: 990 mL    PHYSICAL EXAM:       ICU Vital Signs Last 24 Hrs  T(C): 36.7 (03 Feb 2022 03:41), Max: 36.7 (03 Feb 2022 03:41)  T(F): 98 (03 Feb 2022 03:41), Max: 98 (03 Feb 2022 03:41)  HR: 86 (03 Feb 2022 05:47) (69 - 90)  BP: 95/55 (03 Feb 2022 03:41) (95/54 - 96/42)  BP(mean): --  ABP: --  ABP(mean): --  RR: 18 (03 Feb 2022 03:41) (18 - 19)  SpO2: 96% (03 Feb 2022 05:47) (95% - 99%) on 4lpm nasal canula     General: Awakening slowly and with confusion. More awake and alert as the day progresses. Cooperative. No distress Appears stated age.	  HEENT: Atraumatic. Normocephalic. Anicteric. Normal oral mucosa. PERRL. EOMI. Mallampati class IV airway.  Neck: Supple. Trachea midline. Thyroid without enlargement/tenderness/nodules. No carotid bruit. No JVD. Short and wide  Cardiovascular: Regular rate and rhythm. S1 S2 normal. III/VI systolic murmur  Respiratory: Respirations unlabored. Improved breath sounds left hemithorax. No wheeze. No curvature.  Abdomen: Soft. Non-tender. Non-distended. No organomegaly. No masses. Normal bowel sounds. Obese, PEG.  Extremities: Warm to touch. No clubbing or cyanosis. No pedal edema. LUE PICC line.  Pulses: 2+ peripheral pulses all extremities.	  Skin: Normal skin color. No rashes or lesions. No ecchymoses. No cyanosis. Warm to touch.  Lymph Nodes: Cervical, supraclavicular and axillary nodes normal  Neurological: Left lower extremity plegia with contraction. Left upper extremity is quite weak. A and O x 3  Psychiatry: Calm    LABS:    CBC    WBC  5.38 <==, 4.93 <==    Hemoglobin  9.2 <<==, 9.1 <<==    Hematocrit  30.8 <==, 31.0 <==    Platelets  204 <==, 193 <==      LYTES    sodium  145 <==    potassium   4.5 <==    chloride  107 <==    carbon dioxide  24 <==    =============================================================================================  RENAL FUNCTION:    Creatinine:   0.68  <<==    BUN:   23 <==    ============================================================================================    calcium   9.6 <==    ===========================================================================================  LFTs    AST:   16 <==     ALT:  13  <==     AP:  110  <=    Bili:  0.3  <=    Venous Blood Gas:  01-20 @ 08:53  7.34/55/52/30/83.0  VBG Lactate: 1.7    Serum Pro-Brain Natriuretic Peptide: 148 pg/mL (01-20 @ 08:53)    CARDIAC MARKERS ( 20 Jan 2022 08:53 )  CPK x     /CKMB x     /CKMB Units x        troponin 21 ng/L    < from: Transthoracic Echocardiogram (12.21.21 @ 14:39) >    Patient name: FRANKI MEHTA  YOB: 1935   Age: 86 (F)   MR#: [id number]  Study Date: 12/21/2021  Location: [address] [place], [place] [id number]: Tena Garnett RDCS  Study quality: Technically difficult/Limited  Referring Physician: Edmond Almazan MD  Blood Pressure: 134/77 mmHg  Height: 163 cm  Weight: 109 kg  BSA: 2.1 m2  Heart Rate: 105 mmHg  ------------------------------------------------------------------------  PROCEDURE: Transthoracic echocardiogram with 2-D, M-Mode  and complete spectral andcolor flow Doppler.  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 No segmental  wall motion abnormalities.  Moderate aortic stenosis.  There may be a vegetation on the left or non-coronary cusp  of the aortic valve. Consider transesophageal  echocardiography.  ------------------------------------------------------------------------  Confirmed on  12/22/2021 - 10:32:40 by Rahul Correa M.D.  ------------------------------------------------------------------------    MICROBIOLOGY:     Respiratory Viral Panel with COVID-19 by RYAN (01.20.22 @ 08:52)   Rapid RVP Result: [facility]   [facility]    Urinalysis Basic - ( 20 Jan 2022 14:56 )    Color: Yellow / Appearance: Slightly Turbid / SG: >1.050 / pH: x  Gluc: x / Ketone: Negative  / Bili: Negative / Urobili: Negative   Blood: x / Protein: 100 / Nitrite: Negative   Leuk Esterase: Large / RBC: 7 /hpf / WBC 25 /HPF   Sq Epi: x / Non Sq Epi: 5 /hpf / Bacteria: Few    Culture - Urine (01.20.22 @ 16:37)   Culture Results:   >100,000 CFU/ml Enterococcus faecium (vancomycin resistant)   <10,000 CFU/ml Normal Urogenital ck present     Culture - Bronchial (01.06.22 @ 18:48)   Culture Results:   Normal Respiratory Ck present     Culture - Abscess with Gram Stain (12.23.21 @ 18:49)   Specimen Source: .Abscess abdominal abscess   Culture Results:   Few Escherichia coli   Few Morganella morganii   Moderate Enterococcus avium   Numerous Bacteroides thetaiotamcron group "Susceptibilities not performed"   Numerous Clostridium ramosum "Susceptibilities not performed"     Culture - Blood (12.19.21 @ 14:24)   Culture Results:   Growth in aerobic bottle: Staphylococcus epidermidis   Organism Identification: Staphylococcus epidermidis     Culture - Blood (12.19.21 @ 14:24)   Gram Stain:   Growth in aerobic bottle: Gram Positive Cocci in Clusters   Growth in anaerobic bottle: Gram Positive Cocci in Clusters   - Staphylococcus epidermidis, Methicillin resistant: Detec     RADIOLOGY:  [x] Chest radiographs reviewed and interpreted by me    EXAM:  CT CHEST                          PROCEDURE DATE:  01/31/2022      FINDINGS:    LYMPH NODES: No mediastinal lymphadenopathy.    HEART/VASCULATURE: Heart size is normal. No pericardial effusion. Aortic   and mitral valve calcification. Partially visualized left PICC line   catheter with the tip in the SVC.    AIRWAYS/LUNGS/PLEURA: Complete collapse of the left lower and lingular   lobes with partial collapse of the left upper lobe with bronchial   impaction, progressed compared to prior. There is leftward mediastinal   shift.  Mosaic attenuation of the left upper lobe.  Patchy groundglass opacities at the right apex, similar to prior.  Dependent right lung subsegmental atelectasis.  Stable trace left pleural effusion with intrafissural extension.    UPPER ABDOMEN: Calcified hepatic granuloma.    BONES/SOFT TISSUES: Status post right mastectomy. Status post left   humerus ORIF.    IMPRESSION:    Persistent left lower lobe collapse, lingular collapse and partial left   upper lobe collapse when compared with previous exam. There is mild   progression since the previous study.      Stable patchy groundglass opacities of the right lung apex, suggestive of   infection versus inflammation.    Remaining incidental findings as above.    SHERLEY WATSON MD; Resident Radiologist  This document has been electronically signed.  FAUSTINO PIZANO MD; Attending Radiologist  This document has been electronically signed. Jan 31 2022  4:36PM  ---------------------------------------------------------------------------------------------------------------   EXAM:  XR CHEST PORTABLE URGENT 1V                          PROCEDURE DATE:  01/28/2022      INTERPRETATION:  EXAMINATION: XR CHEST URGENT    CLINICAL INDICATION: Mucus plugging, left lung atelectasis.    TECHNIQUE: Single frontal, portable view of the chest was obtained.    COMPARISON: Chest radiograph 1/27/2022.    FINDINGS:    Left PICC tip within SVC. Status post ORIF of the left humerus.  Cannot accurately assess cardiac size in this projection.  Left lower lung consolidation and volume loss is unchanged.  Left pleural effusion. No pneumothorax.    IMPRESSION:  Left lower lung consolidation and volume loss is unchanged.    MOUSTAPHA COURTNEY MD; Resident Radiology  This document has been electronically signed.  GIANNI MANTILLA MD; Attending Radiologist  This document has been electronically signed. Jan 28 2022  6:32PM  ---------------------------------------------------------------------------------------------------------------  EXAM:  CT ABDOMEN AND PELVIS IC                        [facility]                          PROCEDURE DATE:  01/20/2022      FINDINGS:    CHEST:    PULMONARY ANGIOGRAM: Limited study secondary to extensive respiratory   motion artifact. No evidence of large central pulmonary embolism, with   limited evaluation of lobar, segmental and subsegmental branches. The   pulmonary artery is enlarged measuring 3.3 cm, nonspecific although can   be seen with pulmonary hypertension.    MEDIASTINUM/LYMPH NODES: No mediastinal or hilar lymphadenopathy. Small   hiatal hernia.    AIRWAYS/LUNGS/PLEURA: Degraded by respiratory motion artifact. There is   partial left upper lobe and complete lingular and left lower lobe   collapse with superimposed bronchial impaction. There is a small left   pleural effusion with intrafissural extension. There are patchy   groundglass opacities at the right apex, likely infectious/inflammatory.   No pneumothorax. Mild subpleural reticulation along the right middle lobe   may be secondary to right breast/chest wall radiotherapy.    HEART/VASCULATURE: Heart size is normal. No pericardial effusion. Mild   aortic valvular and mitral annular calcification. A left PICC is present   which has repositioned between the acquisition of the  imaging   (terminating in the SVC on ) and the chest CTA, now crossing midline   into the right innominate vein.    CHEST WALL AND LOWER NECK: The thyroid is incompletely characterized on   this study. Status post right mastectomy.    ABDOMEN AND PELVIS:    LIVER: Within normal limits. Calcified hepatic granuloma.    BILE DUCTS: No intrahepatic biliary ductal dilatation. Redemonstration of   choledocholithiasis in the ampullary region, unchanged since 12/30/2021.    GALLBLADDER: Cholelithiasis.    SPLEEN: Within normal limits.    PANCREAS: 1 cm pancreatic tail cyst.    ADRENALS: Within normal limits.    KIDNEYS/URETERS: Mildly atrophic left kidney. No hydronephrosis.    BLADDER: Within normal limits.    REPRODUCTIVE ORGANS: Uterus present. There is a 1.4 cm right adnexal cyst.    BOWEL: Percutaneous gastrostomy tube present with intraluminal balloon.   No bowel obstruction. Colonic diverticulosis without diverticulitis.   Interval removal of the right lower quadrant pigtail catheter in the   region of the appendix. There is a residual, multilocular 4.0 x 2.2 cm   collection with peripheral enhancement (series 3, image 84) in the region   of previously perforated appendix. Previously seen periappendiceal fat   stranding has largely resolved.    PERITONEUM: No ascites.    VESSELS: Aortic calcifications. Hypodense lesion noted at the origin of   the SMA which was previously seen but not as well appreciated on prior   noncontrast enhanced study on 12/30/2021. SMA however appears patent.    RETROPERITONEUM/LYMPH NODES: No lymphadenopathy.    ABDOMINAL WALL: . Fat-containing umbilical hernia.    BONES: Remote left proximal humeral fracture status post ORIF. Unchanged   T11 and L1 superior endplate compression deformities.    IMPRESSION:  Limited exam for pulmonary embolism; no central pulmonary embolism with   nondiagnostic evaluation of lobar, segmental and subsegmental branches.   No imaging evidence of right heart strain.    Complete lingular and left lower lobe collapse withsuperimposed   bronchial impaction. Small left pleural effusion. Mild patchy right   apical groundglass may be related to pulmonary edema, infection or   inflammation.    Multilocular 4.0 x 2.2 cm right lower quadrant fluid collection in the   regionof previously perforated appendix, status post drain removal.   Periappendiceal fat stranding has largely resolved.    Left PICC crosses midline and terminates in the right innominate vein,   likely repositioned during the administration of contrast between    imaging and CTA acquisition.    Cholelithiasis and choledocholithiasis. No intrahepatic biliary ductal   dilatation.    MARKO GUAMAN DO; Resident Radiologist  This document has been electronically signed.  SHARATH TURCIOS M.D., Attending Radiologist  This document has been electronically signed. Jan 20 2022 12:48PM  ---------------------------------------------------------------------------------------------------------------  EXAM:  CT BRAIN                          PROCEDURE DATE:  01/20/2022      FINDINGS:    Study is limited by motion.    No hydrocephalus, midline shift, or effacement of the basal cisterns. No   gross evidence for acute intracranial hemorrhage or brain edema.    Complete opacification of the right maxillary sinus and right mastoid air   cells.    IMPRESSION:    Limited by motion.    No gross evidence for acute intracranial hemorrhage or brain edema.    No hydrocephalus or midline shift.    Complete opacification of the right maxillary sinus and right mastoid air   cells. Correlate for the presence of sinusitis/mastoiditis.    LESLI KING MD; Attending Radiologist  This document has been electronically signed. Jan 20 2022 10:38AM  ---------------------------------------------------------------------------------------------------------------

## 2022-02-03 NOTE — PROGRESS NOTE ADULT - ASSESSMENT
86   year old female    h/o hemorrhagic CVA, has  PEG,  HTN, MI,   HLD, gout, right ca  breast   right Ca breast. s/p mastectomy in 2019,  s/p  sentinel node  localization.  4/4,  were  negative  peg dislodged.  IR   replacements  on 1/3/22   picc  and iv ab  for 6 weeks, per  ID, on  last  visit  s/p rrt   for hypoxia. cxr with complete  collapsed  of  left lung, needing broch, by  puledson mir   s/ p  bronchoscopy , pt  with  tracheomalacia  and  collapsed  airways,  makes  this a  difficult  situation, as there is no good rx for this          *  p/w   sob. acute  resp failure  on Bipap,  from left   lung  collapse  afberile,  with normal  wbc  on arrival  *   s/p cva, has  PEG,  npo  ,  on  synthroid, Keppra  ,  *  HTN, on meds  per  card  *  h/o ca breast. /, s/p  R  mastectomy  * obesity, bmi  was   41, now  is  34 in  er  left  leg contracture ,  remains  bed  bound. functional  paraplegias  needs  assistance  for  all her  adl's  *   h/o bacteremia. on 12/19/21,  Staph epi, meth R,  from perforated  appendicitis  ct  c/a/p, from last  visit   pna, perforated  appendicitis,  ?  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 this will not alter rx. pt  will need   extended  course  of ab     on iv  vanco and ertapenem.  till  2/9/.22  ID dr reagan, and per  surg  and  IR  eval,  no intervnetion  CT  1/20/22, no PE. collection in right side   on Proventil,  Mucomyst and   saline  nebs.   * pt  with  h/o  tracheomalacia  and  collapsed  airways,  makes  this a  difficult  situation, as there is no good rx for this  given pt;s  mlple co morbidities,  pt is  at risk for  re admissions  picc  line/  on nocturnal  awaps  difficult  situation, a s there  is no  good  rx  for  pt's  recurrent lung collapse hypoxia  on n/ canula oxygen/  when her O2  needs  are less, then can start  d/c  planning to rehab    norvasc stopped/ on lopressor/  on  oxygen    has  been  cleared  by  pulm.  and  re  admission  likely,, given her  airways, and  propensity  for  lung collapse   start  d/c  planning.  on n/c/  cleraed  for   d/c       per  son, pt is  full code     rad< from: CT Angio Chest PE Protocol w/ IV Cont (01.20.22 @ 10:35) >  IMPRESSION:  Limited exam for pulmonary embolism; no central pulmonary embolism with   nondiagnostic evaluation of lobar, segmental and subsegmental branches.   No imaging evidence of right heart strain.  Complete lingular and left lower lobe collapse withsuperimposed   bronchial impaction. Small left pleural effusion. Mild patchy right   apical groundglass may be related to pulmonary edema, infection or   inflammation.  Multilocular 4.0 x 2.2 cm right lower quadrant fluid collection in the   regionof previously perforated appendix, status post drain removal.   Periappendiceal fat stranding has largely resolved.  Left PICC crosses midline and terminates in the right innominate vein,   likely repositioned during the administration of contrast between    imaging and CTA acquisition.  Cholelithiasis and choledocholithiasis. No intrahepatic biliary ductal   dilatation.  --- End of Report ---  < end of copied text >

## 2022-02-03 NOTE — PROGRESS NOTE ADULT - ASSESSMENT
ASSESSMENT:    86 year old gentlewoman, lifelong non-smoker, without history of intrinsic lung disease. 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 She also has a history of HTN, HLD, CAD s/p MI with preserved LVEF and moderate aortic stenosis. The patient was admitted to [facility] on December 19th 2021 with fever and abdominal pain. She was found to have perforated appendicitis with abscess formation. She was treated with tube drainage and antibiotics for a polymicrobial infection. She continues on vancomycin/ertapenem via a PICC line. Admission CT had debris within the left lobar and more distal airways of the left lower lobe as well as some debris within the right lower lobe airway - there was complete atelectasis of the left lower lobe and subsegmental atelectatic changes within the left upper lobe and dependent portions of the right lung. The patient developed increased work of breathing, tachycardia, tachypnea and hypoxemia on January 4th due to mucous plugging with complete collapse of the left lung. She underwent bronchoscopy on January 6th with removal of copious amounts of purulent secretions from throughout the bronchial tree - there was severe tracheobronchomalacia. Bronchial cultures were negative. She was treated with bronchodilators, mucolytic nebs, chest vest and nocturnal AVAPS with expansion of the left upper lobe and some reexpansion of the left lower lobe. She was discharged to rehab on January 11th on a 3lpm nasal canula with plans to continue medical and mechanical therapy as well as nocturnal BIPAP. The patient returns from rehab with decreased mental status, dyspnea and hypoxemia treated with 100% NRB. She currently is obtunded on a BIPAP device. CT scan -> partial left upper lobe and complete lingular and left lower lobe collapse with superimposed bronchial impaction - small left pleural effusion with intrafissural extension - patchy groundglass opacities at the right apex. The patient has redeveloped left lung atelectasis due to mucous plugging -> multifactorial. There is an area of inflammation/infection in the right upper lobe  1) tracheobronchomalacia with marked expiratory airway narrowing preventing adequate sputum clearance  2) weak cough following a CVA with marked deconditioning   3) dysphagia with ongoing aspiration of oral secretions  4) moderate aortic stenosis with a small left pleural effusion    2/1 - much more awake and alert; no shortness of breath or hypoxemia on a nasal canula @ 4lpm; wore AVAPS overnight; cough with difficulty expectorating sputum; no hemoptysis, chest congestion or wheeze; no fevers, chills or sweats; no chest pain/pressure or palpitations; bedbound;     PLAN/RECOMMENDATIONS:    stable oxygenation a 4lpm nasal canula  s/p bronchoscopy 1/6 with removal of copious amounts of purulent mucous from throughout the airways - there was severe tracheobronchomalacia -> cultures are negative  chest CT 1/31 -> mild progression of left lower lobe collapse, lingular collapse and partial left upper lobe collapse  bronchoscopy cannot be safely be performed due to the patient's tenuous respiratory status - she would unlikely be able to be "liberated" from a ventilator if intubated - she would unlikely benefit long term from a repeat bronchoscopy  nocturnal AVAPS to facilitate lung expansion  continues on a prolonged course of vancomycin/ertapenem (possible  infective endocarditis)  albuterol/atrovent nebs q6h  hypertonic saline and mucomyst nebs to facilitate mucous clearance  guaifenesin via PEG  chest vest/manual chest PT  cough assist device  cardiac meds: lopressor/lipitor  DVT prophylaxis - SQ heparin  allopurinol/keppra/synthroid  abdominal/pelvic CT - percutaneous gastrostomy tube present with intraluminal balloon - colonic diverticulosis without diverticulitis - interval removal of the right lower quadrant pigtail catheter in the region of the appendix - there is a residual, multilocular 4.0 x 2.2 cm collection with peripheral enhancement in the region of the previously perforated appendix - periappendiceal fat stranding has largely resolved - ID/IR/surgery evaluations noted - no intervention is needed    I am not hopeful that the left lung will expand due to the multiple issues delineated above - suspect that she will need to be treated with oxygen supplementation to keep saturation greater than 90% accepting the fact that her left lung will be basically non-functional    Will follow with you. Plan of care discussed with the patient at bedside, the dedicated floor NP, the patient's son by phone and with Dr. Segundo. Discharge planning to [facility]    Kennedy Marcano MD, [facility]  [phone]  Pulmonary Medicine

## 2022-02-03 NOTE — PROGRESS NOTE ADULT - SUBJECTIVE AND OBJECTIVE BOX
afberile  REVIEW OF SYSTEMS:  GEN: no fever,    no chills  RESP: no SOB,   no cough  CVS: no chest pain,   no palpitations  GI: no abdominal pain,   no nausea,   no vomiting,   no constipation,   no diarrhea  : no dysuria,   no frequency  NEURO: no headache,   no dizziness  PSYCH: no depression,   not anxious  Derm : no rash    MEDICATIONS  (STANDING):  acetylcysteine 20%  Inhalation 4 milliLiter(s) Inhalation three times a day  albuterol/ipratropium for Nebulization 3 milliLiter(s) Nebulizer every 6 hours  allopurinol 100 milliGRAM(s) Oral daily  artificial  tears Solution 1 Drop(s) Both EYES three times a day  atorvastatin 20 milliGRAM(s) Oral at bedtime  chlorhexidine 2% Cloths 1 Application(s) Topical <User Schedule>  ertapenem  IVPB 1000 milliGRAM(s) IV Intermittent every 24 hours  guaiFENesin Oral Liquid (Sugar-Free) 300 milliGRAM(s) Oral every 6 hours  heparin   Injectable 5000 Unit(s) SubCutaneous every 8 hours  levETIRAcetam  Solution 750 milliGRAM(s) Oral two times a day  levothyroxine 75 MICROGram(s) Oral daily  metoprolol tartrate 25 milliGRAM(s) Oral two times a day  nystatin Powder 1 Application(s) Topical two times a day  sodium chloride 3%  Inhalation 4 milliLiter(s) Inhalation every 6 hours  vancomycin  IVPB 750 milliGRAM(s) IV Intermittent every 24 hours    MEDICATIONS  (PRN):  acetaminophen    Suspension .. 650 milliGRAM(s) Oral every 6 hours PRN Temp greater or equal to 38C (100.4F), Mild Pain (1 - 3)      Vital Signs Last 24 Hrs  T(C): 36.7 (03 Feb 2022 03:41), Max: 36.7 (03 Feb 2022 03:41)  T(F): 98 (03 Feb 2022 03:41), Max: 98 (03 Feb 2022 03:41)  HR: 86 (03 Feb 2022 05:47) (69 - 90)  BP: 95/55 (03 Feb 2022 03:41) (95/54 - 96/42)  BP(mean): --  RR: 18 (03 Feb 2022 03:41) (18 - 19)  SpO2: 96% (03 Feb 2022 05:47) (95% - 99%)  CAPILLARY BLOOD GLUCOSE        I&O's Summary    02 Feb 2022 07:01  -  03 Feb 2022 07:00  --------------------------------------------------------  IN: 1590 mL / OUT: 600 mL / NET: 990 mL        PHYSICAL EXAM:  HEAD:  Atraumatic, Normocephalic  NECK: Supple, No   JVD  CHEST/LUNG:   no     rales,     no,    rhonchi  HEART: Regular rate and rhythm;         murmur  ABDOMEN: Soft, Nontender, ;   EXTREMITIES:   no     edema  NEUROLOGY:  alert    LABS:                          Thyroid Stimulating Hormone, Serum: 2.39 uIU/mL (01-07 @ 09:13)          Consultant(s) Notes Reviewed:      Care Discussed with Consultants/Other Providers:

## 2022-02-04 NOTE — PROGRESS NOTE ADULT - SUBJECTIVE AND OBJECTIVE BOX
afberile    REVIEW OF SYSTEMS:  GEN: no fever,    no chills  RESP: no SOB,   no cough  CVS: no chest pain,   no palpitations  GI: no abdominal pain,   no nausea,   no vomiting,   no constipation,   no diarrhea  : no dysuria,   no frequency  NEURO: no headache,   no dizziness  PSYCH: no depression,   not anxious  Derm : no rash    MEDICATIONS  (STANDING):  acetylcysteine 20%  Inhalation 4 milliLiter(s) Inhalation three times a day  albuterol/ipratropium for Nebulization 3 milliLiter(s) Nebulizer every 6 hours  allopurinol 100 milliGRAM(s) Oral daily  artificial  tears Solution 1 Drop(s) Both EYES three times a day  atorvastatin 20 milliGRAM(s) Oral at bedtime  chlorhexidine 2% Cloths 1 Application(s) Topical <User Schedule>  guaiFENesin Oral Liquid (Sugar-Free) 300 milliGRAM(s) Oral every 6 hours  heparin   Injectable 5000 Unit(s) SubCutaneous every 8 hours  levETIRAcetam  Solution 750 milliGRAM(s) Oral two times a day  levothyroxine 75 MICROGram(s) Oral daily  metoprolol tartrate 25 milliGRAM(s) Oral two times a day  nystatin Powder 1 Application(s) Topical two times a day  sodium chloride 3%  Inhalation 4 milliLiter(s) Inhalation every 6 hours    MEDICATIONS  (PRN):  acetaminophen    Suspension .. 650 milliGRAM(s) Oral every 6 hours PRN Temp greater or equal to 38C (100.4F), Mild Pain (1 - 3)      Vital Signs Last 24 Hrs  T(C): 36.7 (04 Feb 2022 04:29), Max: 37 (03 Feb 2022 21:20)  T(F): 98.1 (04 Feb 2022 04:29), Max: 98.6 (03 Feb 2022 21:20)  HR: 91 (04 Feb 2022 06:20) (90 - 98)  BP: 129/64 (04 Feb 2022 04:29) (100/54 - 135/77)  BP(mean): --  RR: 18 (04 Feb 2022 04:29) (18 - 18)  SpO2: 96% (04 Feb 2022 06:20) (93% - 96%)  CAPILLARY BLOOD GLUCOSE        I&O's Summary    03 Feb 2022 07:01 - 04 Feb 2022 07:00  --------------------------------------------------------  IN: 600 mL / OUT: 650 mL / NET: -50 mL        PHYSICAL EXAM:  HEAD:  Atraumatic, Normocephalic  NECK: Supple, No   JVD  CHEST/LUNG:   no     rales,     no,    rhonchi  HEART: Regular rate and rhythm;         murmur  ABDOMEN: Soft, Nontender, ;   EXTREMITIES:   no     edema  NEUROLOGY:  alert    LABS:                          Thyroid Stimulating Hormone, Serum: 2.39 uIU/mL (01-07 @ 09:13)          Consultant(s) Notes Reviewed:      Care Discussed with Consultants/Other Providers:

## 2022-02-04 NOTE — PROGRESS NOTE ADULT - SUBJECTIVE AND OBJECTIVE BOX
FRANKI MEHTA 86y [id number]    Patient is a 86y old  Female who presents with a chief complaint of sob (04 Feb 2022 07:48)      Follow Up/CC:  ID following for bacteremia    Interval History/ROS: no fever, feels ok     Allergies    Toradol (Urticaria (Mild to Mod); Rash (Mild to Mod))    Intolerances        ANTIMICROBIALS:      MEDICATIONS  (STANDING):  acetylcysteine 20%  Inhalation 4 milliLiter(s) Inhalation three times a day  albuterol/ipratropium for Nebulization 3 milliLiter(s) Nebulizer every 6 hours  allopurinol 100 milliGRAM(s) Oral daily  artificial  tears Solution 1 Drop(s) Both EYES three times a day  atorvastatin 20 milliGRAM(s) Oral at bedtime  chlorhexidine 2% Cloths 1 Application(s) Topical <User Schedule>  guaiFENesin Oral Liquid (Sugar-Free) 300 milliGRAM(s) Oral every 6 hours  heparin   Injectable 5000 Unit(s) SubCutaneous every 8 hours  levETIRAcetam  Solution 750 milliGRAM(s) Oral two times a day  levothyroxine 75 MICROGram(s) Oral daily  metoprolol tartrate 25 milliGRAM(s) Oral two times a day  nystatin Powder 1 Application(s) Topical two times a day  sodium chloride 3%  Inhalation 4 milliLiter(s) Inhalation every 6 hours    MEDICATIONS  (PRN):  acetaminophen    Suspension .. 650 milliGRAM(s) Oral every 6 hours PRN Temp greater or equal to 38C (100.4F), Mild Pain (1 - 3)        Vital Signs Last 24 Hrs  T(C): 36.7 (04 Feb 2022 04:29), Max: 37 (03 Feb 2022 21:20)  T(F): 98.1 (04 Feb 2022 04:29), Max: 98.6 (03 Feb 2022 21:20)  HR: 91 (04 Feb 2022 06:20) (90 - 98)  BP: 129/64 (04 Feb 2022 04:29) (100/54 - 135/77)  BP(mean): --  RR: 18 (04 Feb 2022 04:29) (18 - 18)  SpO2: 96% (04 Feb 2022 06:20) (93% - 96%)                  MICROBIOLOGY:  Clean Catch Clean Catch (Midstream)  01-20-22   >100,000 CFU/ml Enterococcus faecium (vancomycin resistant)  <10,000 CFU/ml Normal Urogenital ck present  --  Enterococcus faecium (vancomycin resistant)      .Blood Blood-Peripheral  01-20-22   No Growth Final  --  --      BAL Bronchoalveolar Lavage  01-06-22   No growth at 1 week.  --    No polymorphonuclear cells seen per low power field  No squamous epithelial cells per low power field  No organisms seen per oil power field              Vancomycin Level, Trough: 17.4 ug/mL (02-04-22 @ 07:21)  v            RADIOLOGY

## 2022-02-04 NOTE — PROGRESS NOTE ADULT - ASSESSMENT
86   year old female    h/o hemorrhagic CVA, has  PEG,  HTN, MI,   HLD, gout, right ca  breast   right Ca breast. s/p mastectomy in 2019,  s/p  sentinel node  localization.  4/4,  were  negative  peg dislodged.  IR   replacements  on 1/3/22   picc  and iv ab  for 6 weeks, per  ID, on  last  visit  s/p rrt   for hypoxia. cxr with complete  collapsed  of  left lung, needing broch, by  pulm [facility]   s/ p  bronchoscopy , pt  with  tracheomalacia  and  collapsed  airways,  makes  this a  difficult  situation, as there is no good rx for this          *  p/w   sob. acute  resp failure  on Bipap,  from left   lung  collapse  afberile,  with normal  wbc  on arrival  *   s/p cva, has  PEG,  npo  ,  on  synthroid, Keppra  ,  *  HTN, on meds  per  card  *  h/o ca breast. /, s/p  R  mastectomy  * obesity, bmi  was   41, now  is  34 in  er  left  leg contracture ,  remains  bed  bound. functional  paraplegias  needs  assistance  for  all her  adl's  *   h/o bacteremia. on 12/19/21,  Staph epi, meth R,  from perforated  appendicitis  ct  c/a/p, from last  visit   pna, perforated  appendicitis,  ?  mets  to  acetabulum, was  seen by dr pacheco   surg/ IR  and  oncology eval dr pacheco   sa w pt.  no intervention/  son awrae     and  also, with  prior ,  echo, vegetation on  aortic  valve/ endocarditis,   and  per  card, pt is  at  high risk  for  esdras    per card , pt high risk for esdras  and given that . this will not alter rx. pt  will need   extended  course  of ab     on iv  vanco and ertapenem.  till  2/9/.22  ID dr reagan, and per  surg  and  IR  eval,  no intervnetion  CT  1/20/22, no PE. collection in right side   on Proventil,  Mucomyst and   saline  nebs.   * pt  with  h/o  tracheomalacia  and  collapsed  airways,  makes  this a  difficult  situation, as there is no good rx for this  given pt;s  mlple co morbidities,  pt is  at risk for  re admissions  picc  line/  on nocturnal  awaps  difficult  situation, a s there  is no  good  rx  for  pt's  recurrent lung collapse hypoxia   on lopressor/  on  oxygen    has  been  cleared  by  pulm.  and  re  admission  likely,, given her  airways, and  propensity  for  lung collapse   start  d/c  planning.  on n/c/  cleraed  for   d/c  awiating   d/c  to rehab,  with  nocturnal  awaps       per  son, pt is  full code     rad< from: CT Angio Chest PE Protocol w/ IV Cont (01.20.22 @ 10:35) >  IMPRESSION:  Limited exam for pulmonary embolism; no central pulmonary embolism with   nondiagnostic evaluation of lobar, segmental and subsegmental branches.   No imaging evidence of right heart strain.  Complete lingular and left lower lobe collapse withsuperimposed   bronchial impaction. Small left pleural effusion. Mild patchy right   apical groundglass may be related to pulmonary edema, infection or   inflammation.  Multilocular 4.0 x 2.2 cm right lower quadrant fluid collection in the   regionof previously perforated appendix, status post drain removal.   Periappendiceal fat stranding has largely resolved.  Left PICC crosses midline and terminates in the right innominate vein,   likely repositioned during the administration of contrast between    imaging and CTA acquisition.  Cholelithiasis and choledocholithiasis. No intrahepatic biliary ductal   dilatation.  --- End of Report ---  < end of copied text >

## 2022-02-04 NOTE — PROGRESS NOTE ADULT - ASSESSMENT
ASSESSMENT:    86 year old gentlewoman, lifelong non-smoker, without history of intrinsic lung disease. 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 She also has a history of HTN, HLD, CAD s/p MI with preserved LVEF and moderate aortic stenosis. The patient was admitted to [facility] on December 19th 2021 with fever and abdominal pain. She was found to have perforated appendicitis with abscess formation. She was treated with tube drainage and antibiotics for a polymicrobial infection. She continues on vancomycin/ertapenem via a PICC line. Admission CT had debris within the left lobar and more distal airways of the left lower lobe as well as some debris within the right lower lobe airway - there was complete atelectasis of the left lower lobe and subsegmental atelectasis of the left upper lobe and dependent portions of the right lung. The patient developed increased work of breathing, tachycardia, tachypnea and hypoxemia on January 4th due to mucous plugging with complete collapse of the left lung. She underwent bronchoscopy on January 6th with removal of copious amounts of purulent secretions from throughout the bronchial tree - there was severe tracheobronchomalacia. Bronchial cultures were negative. She was treated with bronchodilators, mucolytic nebs, chest vest and nocturnal AVAPS with expansion of the left upper lobe and some reexpansion of the left lower lobe. She was discharged to rehab on January 11th on a 3lpm nasal canula with plans to continue medical and mechanical therapy as well as nocturnal BIPAP. The patient returns from rehab with decreased mental status, dyspnea and hypoxemia treated with 100% NRB. She currently is obtunded on a BIPAP device. CT scan -> partial left upper lobe and complete lingular and left lower lobe collapse with superimposed bronchial impaction - small left pleural effusion with intrafissural extension - patchy groundglass opacities at the right apex. The patient has redeveloped left lung atelectasis due to mucous plugging -> multifactorial. There is an area of inflammation/infection in the right upper lobe  1) tracheobronchomalacia with marked expiratory airway narrowing preventing adequate sputum clearance  2) weak cough following a CVA with marked deconditioning   3) dysphagia with ongoing aspiration of oral secretions  4) moderate aortic stenosis with a small left pleural effusion    2/1 - much more awake and alert; no shortness of breath or hypoxemia on a nasal canula @ 4lpm; wore AVAPS overnight; cough with difficulty expectorating sputum; no hemoptysis, chest congestion or wheeze; no fevers, chills or sweats; no chest pain/pressure or palpitations; bedbound;     2/4 - much more awake and alert; no shortness of breath or hypoxemia on a nasal canula @ 4 - 6lpm; wore AVAPS overnight; cough with difficulty expectorating sputum; no hemoptysis, chest congestion or wheeze; no fevers, chills or sweats; no chest pain/pressure or palpitations; bedbound; states that she tolerated a dysphagia diet at [place]      PLAN/RECOMMENDATIONS:    stable oxygenation a 4 - 6lpm nasal canula  check VBG  follow-up CXR  s/p bronchoscopy 1/6 with removal of copious amounts of purulent mucous from throughout the airways - there was severe tracheobronchomalacia -> cultures are negative  chest CT 1/31 -> mild progression of left lower lobe collapse, lingular collapse and partial left upper lobe collapse  bronchoscopy cannot be safely be performed due to the patient's tenuous respiratory status - she would unlikely be able to be "liberated" from a ventilator if intubated - she would unlikely benefit long term from a repeat bronchoscopy  nocturnal BIPAP to facilitate lung expansion  continues on a prolonged course of vancomycin/ertapenem (possible  infective endocarditis)  albuterol/atrovent nebs q6h  hypertonic saline and mucomyst nebs to facilitate mucous clearance  guaifenesin via PEG  chest vest/manual chest PT  cough assist device  cardiac meds: lopressor/lipitor  DVT prophylaxis - SQ heparin  allopurinol/keppra/synthroid  abdominal/pelvic CT - percutaneous gastrostomy tube present with intraluminal balloon - colonic diverticulosis without diverticulitis - interval removal of the right lower quadrant pigtail catheter in the region of the appendix - there is a residual, multilocular 4.0 x 2.2 cm collection with peripheral enhancement in the region of the previously perforated appendix - periappendiceal fat stranding has largely resolved - ID/IR/surgery evaluations noted - no intervention is needed    I am not hopeful that the left lung will expand due to the multiple issues delineated above - suspect that she will need to be treated with oxygen supplementation to keep saturation greater than 90% accepting the fact that her left lung will be basically non-functional    Will follow with you. Plan of care discussed with the patient at bedside, the dedicated floor NP, the patient's son Luis by phone and with Dr. Segundo. Discharge planning to [facility]    Kennedy Marcano MD, [facility]  [phone]  Pulmonary Medicine   ASSESSMENT:    86 year old gentlewoman, lifelong non-smoker, without history of intrinsic lung disease. 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 She also has a history of HTN, HLD, CAD s/p MI with preserved LVEF and moderate aortic stenosis. The patient was admitted to [facility] on December 19th 2021 with fever and abdominal pain. She was found to have perforated appendicitis with abscess formation. She was treated with tube drainage and antibiotics for a polymicrobial infection. She continues on vancomycin/ertapenem via a PICC line. Admission CT had debris within the left lobar and more distal airways of the left lower lobe as well as some debris within the right lower lobe airway - there was complete atelectasis of the left lower lobe and subsegmental atelectasis of the left upper lobe and dependent portions of the right lung. The patient developed increased work of breathing, tachycardia, tachypnea and hypoxemia on January 4th due to mucous plugging with complete collapse of the left lung. She underwent bronchoscopy on January 6th with removal of copious amounts of purulent secretions from throughout the bronchial tree - there was severe tracheobronchomalacia. Bronchial cultures were negative. She was treated with bronchodilators, mucolytic nebs, chest vest and nocturnal AVAPS with expansion of the left upper lobe and some reexpansion of the left lower lobe. She was discharged to rehab on January 11th on a 3lpm nasal canula with plans to continue medical and mechanical therapy as well as nocturnal BIPAP. The patient returns from rehab with decreased mental status, dyspnea and hypoxemia treated with 100% NRB. She currently is obtunded on a BIPAP device. CT scan -> partial left upper lobe and complete lingular and left lower lobe collapse with superimposed bronchial impaction - small left pleural effusion with intrafissural extension - patchy groundglass opacities at the right apex. The patient has redeveloped left lung atelectasis due to mucous plugging -> multifactorial. There is an area of inflammation/infection in the right upper lobe  1) tracheobronchomalacia with marked expiratory airway narrowing preventing adequate sputum clearance  2) weak cough following a CVA with marked deconditioning   3) dysphagia with ongoing aspiration of oral secretions  4) moderate aortic stenosis with a small left pleural effusion    2/1 - much more awake and alert; no shortness of breath or hypoxemia on a nasal canula @ 4lpm; wore AVAPS overnight; cough with difficulty expectorating sputum; no hemoptysis, chest congestion or wheeze; no fevers, chills or sweats; no chest pain/pressure or palpitations; bedbound;     2/4 - much more awake and alert; no shortness of breath or hypoxemia on a nasal canula @ 4 - 6lpm; wore AVAPS overnight; cough with difficulty expectorating sputum; no hemoptysis, chest congestion or wheeze; no fevers, chills or sweats; no chest pain/pressure or palpitations; bedbound; states that she tolerated a dysphagia diet at [place]      PLAN/RECOMMENDATIONS:    stable oxygenation a 4 - 6lpm nasal canula  check VBG  follow-up CXR  s/p bronchoscopy 1/6 with removal of copious amounts of purulent mucous from throughout the airways - there was severe tracheobronchomalacia -> cultures are negative  chest CT 1/31 -> mild progression of left lower lobe collapse, lingular collapse and partial left upper lobe collapse  bronchoscopy cannot be safely be performed due to the patient's tenuous respiratory status - she would unlikely be able to be "liberated" from a ventilator if intubated - she would unlikely benefit long term from a repeat bronchoscopy  nocturnal BIPAP to facilitate lung expansion  continues on a prolonged course of vancomycin/ertapenem (possible  infective endocarditis)  albuterol/atrovent nebs q6h  hypertonic saline and mucomyst nebs to facilitate mucous clearance  guaifenesin via PEG  chest vest/manual chest PT  cough assist device  cardiac meds: lopressor/lipitor  DVT prophylaxis - SQ heparin  allopurinol/keppra/synthroid  abdominal/pelvic CT - percutaneous gastrostomy tube present with intraluminal balloon - colonic diverticulosis without diverticulitis - interval removal of the right lower quadrant pigtail catheter in the region of the appendix - there is a residual, multilocular 4.0 x 2.2 cm collection with peripheral enhancement in the region of the previously perforated appendix - periappendiceal fat stranding has largely resolved - ID/IR/surgery evaluations noted - no intervention is needed    I am not hopeful that the left lung will expand due to the multiple issues delineated above - suspect that she will need to be treated with oxygen supplementation to keep saturation greater than 90% accepting the fact that her left lung will be basically non-functional    Will follow with you. Plan of care discussed with the patient at bedside, the dedicated floor NP, the patient's son Luis by phone and with Dr. Segundo. Discharge planning to [place] [facility]    Dr. Liborio Dexter will be covering our patients over the weekend. Please call him with questions or clinical changes. Service number - [phone]      Kennedy Marcano MD, [facility]  [phone]  Pulmonary Medicine

## 2022-02-04 NOTE — PROGRESS NOTE ADULT - SUBJECTIVE AND OBJECTIVE BOX
[facility] - PROGRESS NOTE    CHIEF COMPLAINT: acute hypoxic respiratory failure; mucous plugging; left lung atelectasis; weak cough; dysphagia; tracheobronchomalacia; PEG;     INTERVAL HISTORY: last day of isolation due to contact with a staff member with COVID; awakening slowly and with confusion - becomes more awake and alert as the day progresses; no shortness of breath or hypoxemia on a nasal canula @ 6lpm; wore AVAPS overnight; cough with difficulty expectorating sputum; no hemoptysis, chest congestion or wheeze; no fevers, chills or sweats; no chest pain/pressure or palpitations; bedbound; CT scan  -> mild progression of left lower lobe collapse, lingular collapse and partial left upper lobe collapse; eager to go back to [place]    REVIEW OF SYSTEMS:  Constitutional: As per interval history  HEENT: Within normal limits  CV: As per interval history  Resp: As per interval history  GI: dysphagia -> PEG  : Within normal limits  Musculoskeletal: Within normal limits  Skin: Within normal limits  Neurological: CVA - > left sided weakness/plegia  Psychiatric: Within normal limits  Endocrine: Within normal limits  Hematologic/Lymphatic: Within normal limits  Allergic/Immunologic: Within normal limits    MEDICATIONS:     Pulmonary "  acetylcysteine 20%  Inhalation 4 milliLiter(s) Inhalation three times a day  albuterol/ipratropium for Nebulization 3 milliLiter(s) Nebulizer every 6 hours  guaiFENesin Oral Liquid (Sugar-Free) 300 milliGRAM(s) Oral every 6 hours  sodium chloride 3%  Inhalation 4 milliLiter(s) Inhalation every 6 hours    Anti-microbials:    Cardiovascular:  metoprolol tartrate 25 milliGRAM(s) Oral two times a day    Other:  allopurinol 100 milliGRAM(s) Oral daily  artificial  tears Solution 1 Drop(s) Both EYES three times a day  atorvastatin 20 milliGRAM(s) Oral at bedtime  chlorhexidine 2% Cloths 1 Application(s) Topical <User Schedule>  heparin   Injectable 5000 Unit(s) SubCutaneous every 8 hours  levETIRAcetam  Solution 750 milliGRAM(s) Oral two times a day  levothyroxine 75 MICROGram(s) Oral daily  nystatin Powder 1 Application(s) Topical two times a day    MEDICATIONS  (PRN):  acetaminophen    Suspension .. 650 milliGRAM(s) Oral every 6 hours PRN Temp greater or equal to 38C (100.4F), Mild Pain (1 - 3)    OBJECTIVE:    I&O's Detail    2022 07:01  -  2022 07:00  --------------------------------------------------------  IN:    Enteral Tube Flush: 100 mL    IV PiggyBack: 250 mL    Jevity 1.2: 250 mL  Total IN: 600 mL    OUT:    Voided (mL): 650 mL  Total OUT: 650 mL    Total NET: -50 mL      Daily Weight in k.1 (2022 00:08)    PHYSICAL EXAM:       ICU Vital Signs Last 24 Hrs  T(C): 36.7 (2022 04:29), Max: 37 (2022 21:20)  T(F): 98.1 (2022 04:29), Max: 98.6 (2022 21:20)  HR: 91 (2022 06:20) (90 - 98)  BP: 129/64 (2022 04:29) (100/54 - 135/77)  BP(mean): --  ABP: --  ABP(mean): --  RR: 18 (2022 04:29) (18 - 18)  SpO2: 96% (2022 06:20) (93% - 96%) on 4 - 6lpm nasal canula     General: Awakening slowly and with confusion. More awake and alert as the day progresses. Cooperative. No distress Appears stated age.	  HEENT: Atraumatic. Normocephalic. Anicteric. Normal oral mucosa. PERRL. EOMI. Mallampati class IV airway.  Neck: Supple. Trachea midline. Thyroid without enlargement/tenderness/nodules. No carotid bruit. No JVD. Short and wide  Cardiovascular: Regular rate and rhythm. S1 S2 normal. III/VI systolic murmur  Respiratory: Respirations unlabored. Decreased breath sounds left hemithorax. No wheeze. No curvature.  Abdomen: Soft. Non-tender. Non-distended. No organomegaly. No masses. Normal bowel sounds. Obese, PEG.  Extremities: Warm to touch. No clubbing or cyanosis. No pedal edema. LUE PICC line.  Pulses: 2+ peripheral pulses all extremities.	  Skin: Normal skin color. No rashes or lesions. No ecchymoses. No cyanosis. Warm to touch.  Lymph Nodes: Cervical, supraclavicular and axillary nodes normal  Neurological: Left lower extremity plegia with contraction. Left upper extremity is quite weak. A and O x 3  Psychiatry: Calm    LABS:      CBC    WBC  5.38 <==, 4.93 <==    Hemoglobin  9.2 <<==, 9.1 <<==    Hematocrit  30.8 <==, 31.0 <==    Platelets  204 <==, 193 <==      LYTES    sodium  145 <==    potassium   4.5 <==    chloride  107 <==    carbon dioxide  24 <==    =============================================================================================  RENAL FUNCTION:    Creatinine:   0.68  <<==    BUN:   23 <==    ============================================================================================    calcium   9.6 <==  ============================================================================================  LFTs    AST:   16 <==     ALT:  13  <==     AP:  110  <=    Bili:  0.3  <=    Venous Blood Gas:   @ 08:53  7.34/55/52/30/83.0  VBG Lactate: 1.7    Serum Pro-Brain Natriuretic Peptide: 148 pg/mL ( @ 08:53)    CARDIAC MARKERS ( 2022 08:53 )  CPK x     /CKMB x     /CKMB Units x        troponin 21 ng/L    < from: Transthoracic Echocardiogram (21 @ 14:39) >    Patient name: FRANKI MEHTA  YOB: 1935   Age: 86 (F)   MR#: [id number]  Study Date: 2021  Location: [address] [place], [place] [id number]: Tena Garnett RDCS  Study quality: Technically difficult/Limited  Referring Physician: Edmond Almazan MD  Blood Pressure: 134/77 mmHg  Height: 163 cm  Weight: 109 kg  BSA: 2.1 m2  Heart Rate: 105 mmHg  ------------------------------------------------------------------------  PROCEDURE: Transthoracic echocardiogram with 2-D, M-Mode  and complete spectral andcolor flow Doppler.  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 No segmental  wall motion abnormalities.  Moderate aortic stenosis.  There may be a vegetation on the left or non-coronary cusp  of the aortic valve. Consider transesophageal  echocardiography.  ------------------------------------------------------------------------  Confirmed on  2021 - 10:32:40 by Rahul Correa M.D.  ------------------------------------------------------------------------    MICROBIOLOGY:     Respiratory Viral Panel with COVID-19 by RYAN (22 @ 08:52)   Rapid RVP Result: [facility]   [facility]    Urinalysis Basic - ( 2022 14:56 )    Color: Yellow / Appearance: Slightly Turbid / SG: >1.050 / pH: x  Gluc: x / Ketone: Negative  / Bili: Negative / Urobili: Negative   Blood: x / Protein: 100 / Nitrite: Negative   Leuk Esterase: Large / RBC: 7 /hpf / WBC 25 /HPF   Sq Epi: x / Non Sq Epi: 5 /hpf / Bacteria: Few    Culture - Urine (22 @ 16:37)   Culture Results:   >100,000 CFU/ml Enterococcus faecium (vancomycin resistant)   <10,000 CFU/ml Normal Urogenital ck present     Culture - Bronchial (22 @ 18:48)   Culture Results:   Normal Respiratory Ck present     Culture - Abscess with Gram Stain (21 @ 18:49)   Specimen Source: .Abscess abdominal abscess   Culture Results:   Few Escherichia coli   Few Morganella morganii   Moderate Enterococcus avium   Numerous Bacteroides thetaiotamcron group "Susceptibilities not performed"   Numerous Clostridium ramosum "Susceptibilities not performed"     Culture - Blood (21 @ 14:24)   Culture Results:   Growth in aerobic bottle: Staphylococcus epidermidis   Organism Identification: Staphylococcus epidermidis     Culture - Blood (21 @ 14:24)   Gram Stain:   Growth in aerobic bottle: Gram Positive Cocci in Clusters   Growth in anaerobic bottle: Gram Positive Cocci in Clusters   - Staphylococcus epidermidis, Methicillin resistant: Detec     RADIOLOGY:  [x] Chest radiographs reviewed and interpreted by me    EXAM:  CT CHEST                          PROCEDURE DATE:  2022      FINDINGS:    LYMPH NODES: No mediastinal lymphadenopathy.    HEART/VASCULATURE: Heart size is normal. No pericardial effusion. Aortic   and mitral valve calcification. Partially visualized left PICC line   catheter with the tip in the SVC.    AIRWAYS/LUNGS/PLEURA: Complete collapse of the left lower and lingular   lobes with partial collapse of the left upper lobe with bronchial   impaction, progressed compared to prior. There is leftward mediastinal   shift.  Mosaic attenuation of the left upper lobe.  Patchy groundglass opacities at the right apex, similar to prior.  Dependent right lung subsegmental atelectasis.  Stable trace left pleural effusion with intrafissural extension.    UPPER ABDOMEN: Calcified hepatic granuloma.    BONES/SOFT TISSUES: Status post right mastectomy. Status post left   humerus ORIF.    IMPRESSION:    Persistent left lower lobe collapse, lingular collapse and partial left   upper lobe collapse when compared with previous exam. There is mild   progression since the previous study.      Stable patchy groundglass opacities of the right lung apex, suggestive of   infection versus inflammation.    Remaining incidental findings as above.    SHERLEY WATSON MD; Resident Radiologist  This document has been electronically signed.  FAUSTINO PIZANO MD; Attending Radiologist  This document has been electronically signed. 2022  4:36PM  ---------------------------------------------------------------------------------------------------------------   EXAM:  XR CHEST PORTABLE URGENT 1V                          PROCEDURE DATE:  2022      INTERPRETATION:  EXAMINATION: XR CHEST URGENT    CLINICAL INDICATION: Mucus plugging, left lung atelectasis.    TECHNIQUE: Single frontal, portable view of the chest was obtained.    COMPARISON: Chest radiograph 2022.    FINDINGS:    Left PICC tip within SVC. Status post ORIF of the left humerus.  Cannot accurately assess cardiac size in this projection.  Left lower lung consolidation and volume loss is unchanged.  Left pleural effusion. No pneumothorax.    IMPRESSION:  Left lower lung consolidation and volume loss is unchanged.    MOUSTAPHA COURTNEY MD; Resident Radiology  This document has been electronically signed.  GIANNI MANTILLA MD; Attending Radiologist  This document has been electronically signed. 2022  6:32PM  ---------------------------------------------------------------------------------------------------------------  EXAM:  CT ABDOMEN AND PELVIS IC                        [facility]                          PROCEDURE DATE:  2022      FINDINGS:    CHEST:    PULMONARY ANGIOGRAM: Limited study secondary to extensive respiratory   motion artifact. No evidence of large central pulmonary embolism, with   limited evaluation of lobar, segmental and subsegmental branches. The   pulmonary artery is enlarged measuring 3.3 cm, nonspecific although can   be seen with pulmonary hypertension.    MEDIASTINUM/LYMPH NODES: No mediastinal or hilar lymphadenopathy. Small   hiatal hernia.    AIRWAYS/LUNGS/PLEURA: Degraded by respiratory motion artifact. There is   partial left upper lobe and complete lingular and left lower lobe   collapse with superimposed bronchial impaction. There is a small left   pleural effusion with intrafissural extension. There are patchy   groundglass opacities at the right apex, likely infectious/inflammatory.   No pneumothorax. Mild subpleural reticulation along the right middle lobe   may be secondary to right breast/chest wall radiotherapy.    HEART/VASCULATURE: Heart size is normal. No pericardial effusion. Mild   aortic valvular and mitral annular calcification. A left PICC is present   which has repositioned between the acquisition of the  imaging   (terminating in the SVC on ) and the chest CTA, now crossing midline   into the right innominate vein.    CHEST WALL AND LOWER NECK: The thyroid is incompletely characterized on   this study. Status post right mastectomy.    ABDOMEN AND PELVIS:    LIVER: Within normal limits. Calcified hepatic granuloma.    BILE DUCTS: No intrahepatic biliary ductal dilatation. Redemonstration of   choledocholithiasis in the ampullary region, unchanged since 2021.    GALLBLADDER: Cholelithiasis.    SPLEEN: Within normal limits.    PANCREAS: 1 cm pancreatic tail cyst.    ADRENALS: Within normal limits.    KIDNEYS/URETERS: Mildly atrophic left kidney. No hydronephrosis.    BLADDER: Within normal limits.    REPRODUCTIVE ORGANS: Uterus present. There is a 1.4 cm right adnexal cyst.    BOWEL: Percutaneous gastrostomy tube present with intraluminal balloon.   No bowel obstruction. Colonic diverticulosis without diverticulitis.   Interval removal of the right lower quadrant pigtail catheter in the   region of the appendix. There is a residual, multilocular 4.0 x 2.2 cm   collection with peripheral enhancement (series 3, image 84) in the region   of previously perforated appendix. Previously seen periappendiceal fat   stranding has largely resolved.    PERITONEUM: No ascites.    VESSELS: Aortic calcifications. Hypodense lesion noted at the origin of   the SMA which was previously seen but not as well appreciated on prior   noncontrast enhanced study on 2021. SMA however appears patent.    RETROPERITONEUM/LYMPH NODES: No lymphadenopathy.    ABDOMINAL WALL: . Fat-containing umbilical hernia.    BONES: Remote left proximal humeral fracture status post ORIF. Unchanged   T11 and L1 superior endplate compression deformities.    IMPRESSION:  Limited exam for pulmonary embolism; no central pulmonary embolism with   nondiagnostic evaluation of lobar, segmental and subsegmental branches.   No imaging evidence of right heart strain.    Complete lingular and left lower lobe collapse withsuperimposed   bronchial impaction. Small left pleural effusion. Mild patchy right   apical groundglass may be related to pulmonary edema, infection or   inflammation.    Multilocular 4.0 x 2.2 cm right lower quadrant fluid collection in the   regionof previously perforated appendix, status post drain removal.   Periappendiceal fat stranding has largely resolved.    Left PICC crosses midline and terminates in the right innominate vein,   likely repositioned during the administration of contrast between    imaging and CTA acquisition.    Cholelithiasis and choledocholithiasis. No intrahepatic biliary ductal   dilatation.    MARKO GUAMAN DO; Resident Radiologist  This document has been electronically signed.  SHARATH TURCIOS M.D., Attending Radiologist  This document has been electronically signed. 2022 12:48PM  ---------------------------------------------------------------------------------------------------------------  EXAM:  CT BRAIN                          PROCEDURE DATE:  2022      FINDINGS:    Study is limited by motion.    No hydrocephalus, midline shift, or effacement of the basal cisterns. No   gross evidence for acute intracranial hemorrhage or brain edema.    Complete opacification of the right maxillary sinus and right mastoid air   cells.    IMPRESSION:    Limited by motion.    No gross evidence for acute intracranial hemorrhage or brain edema.    No hydrocephalus or midline shift.    Complete opacification of the right maxillary sinus and right mastoid air   cells. Correlate for the presence of sinusitis/mastoiditis.    LESLI KING MD; Attending Radiologist  This document has been electronically signed. 2022 10:38AM  ---------------------------------------------------------------------------------------------------------------

## 2022-02-04 NOTE — PROGRESS NOTE ADULT - ASSESSMENT
87 y/o female, hx of CVA, MI, breast CA, COPD, not on home 02, s/p recent  perforated  appendox/ abscess,  HN, HLD, hypothyroid,  obexity, relatively  be d boind, , gout BIBEMS from [facility] due to SOB., started this morning patient initial 02 sat was 84% placed on NRB and improved to 93 with recent perforated appendicitis, possible IE on long term iv abx     John Fragoso  Attending Physician   Division of Infectious Disease  Pager #[phone]  Available on [employer] Teams also  After 5pm/weekend or no response, call #[phone]

## 2022-02-04 NOTE — PROGRESS NOTE ADULT - SUBJECTIVE AND OBJECTIVE BOX
CARDIOLOGY     PROGRESS  NOTE   ________________________________________________    CHIEF COMPLAINT:Patient is a 86y old  Female who presents with a chief complaint of sob (04 Feb 2022 07:25)  no complain.  	  REVIEW OF SYSTEMS:  CONSTITUTIONAL: No fever, weight loss, or fatigue  EYES: No eye pain, visual disturbances, or discharge  ENT:  No difficulty hearing, tinnitus, vertigo; No sinus or throat pain  NECK: No pain or stiffness  RESPIRATORY: No cough, wheezing, chills or hemoptysis; + Shortness of Breath  CARDIOVASCULAR: No chest pain, palpitations, passing out, dizziness, or leg swelling  GASTROINTESTINAL: No abdominal or epigastric pain. No nausea, vomiting, or hematemesis; No diarrhea or constipation. No melena or hematochezia.  GENITOURINARY: No dysuria, frequency, hematuria, or incontinence  NEUROLOGICAL: No headaches, memory loss, loss of strength, numbness, or tremors  SKIN: No itching, burning, rashes, or lesions   LYMPH Nodes: No enlarged glands  ENDOCRINE: No heat or cold intolerance; No hair loss  MUSCULOSKELETAL: No joint pain or swelling; No muscle, back, or extremity pain  PSYCHIATRIC: No depression, anxiety, mood swings, or difficulty sleeping  HEME/LYMPH: No easy bruising, or bleeding gums  ALLERGY AND IMMUNOLOGIC: No hives or eczema	    [ ] All others negative	  [ ] Unable to obtain    PHYSICAL EXAM:  T(C): 36.7 (02-04-22 @ 04:29), Max: 37 (02-03-22 @ 21:20)  HR: 91 (02-04-22 @ 06:20) (90 - 98)  BP: 129/64 (02-04-22 @ 04:29) (100/54 - 135/77)  RR: 18 (02-04-22 @ 04:29) (18 - 18)  SpO2: 96% (02-04-22 @ 06:20) (93% - 96%)  Wt(kg): --  I&O's Summary    03 Feb 2022 07:01  -  04 Feb 2022 07:00  --------------------------------------------------------  IN: 600 mL / OUT: 650 mL / NET: -50 mL        Appearance: Normal	  HEENT:   Normal oral mucosa, PERRL, EOMI	  Lymphatic: No lymphadenopathy  Cardiovascular: Normal S1 S2, No JVD, + murmurs, No edema  Respiratory: Lungs clear to auscultation	  Psychiatry: A & O x 3, Mood & affect appropriate  Gastrointestinal:  Soft, Non-tender, + BS	  Skin: No rashes, No ecchymoses, No cyanosis	  Neurologic: Non-focal  Extremities: Normal range of motion, No clubbing, cyanosis or edema  Vascular: Peripheral pulses palpable 2+ bilaterally    MEDICATIONS  (STANDING):  acetylcysteine 20%  Inhalation 4 milliLiter(s) Inhalation three times a day  albuterol/ipratropium for Nebulization 3 milliLiter(s) Nebulizer every 6 hours  allopurinol 100 milliGRAM(s) Oral daily  artificial  tears Solution 1 Drop(s) Both EYES three times a day  atorvastatin 20 milliGRAM(s) Oral at bedtime  chlorhexidine 2% Cloths 1 Application(s) Topical <User Schedule>  guaiFENesin Oral Liquid (Sugar-Free) 300 milliGRAM(s) Oral every 6 hours  heparin   Injectable 5000 Unit(s) SubCutaneous every 8 hours  levETIRAcetam  Solution 750 milliGRAM(s) Oral two times a day  levothyroxine 75 MICROGram(s) Oral daily  metoprolol tartrate 25 milliGRAM(s) Oral two times a day  nystatin Powder 1 Application(s) Topical two times a day  sodium chloride 3%  Inhalation 4 milliLiter(s) Inhalation every 6 hours      TELEMETRY: 	    ECG:  	  RADIOLOGY:  OTHER: 	  	  LABS:	 	    CARDIAC MARKERS:                  proBNP: Serum Pro-Brain Natriuretic Peptide: 148 pg/mL (01-20 @ 08:53)    Lipid Profile:   HgA1c:   TSH: Thyroid Stimulating Hormone, Serum: 2.39 uIU/mL (01-07 @ 09:13)          Assessment and plan  ---------------------------  87 y/o female, hx of CVA, MI, breast CA, COPD, not on home 02, s/p  recent  perforated  appendix / abscess,  HN, HLD, hypothyroid,  obesity relatively  bed bound , gout   BIBEMS from [facility] due to SOB., started this morning   patient initial 02 sat was 84% placed on NRB and improved to 93%.  has a pic line to left arm ,  and  peg tube  pt had a long course of admission sec to mucus plug and collapsed lung, s/p bronchoscopy.  ct angio of the chest noted, sig abnormality with collapse of the lung  small pleural effusion, doubt chf  agree with iv abx  dvt prophylaxis  pulmonary noted  ecg noted with old inferior wall mi, no acute changes  continue current meds  continue current bp meds  echo noted  remained in NSR   sob sec to underlying lung disease  dvt prophylaxis  continue abx, fu blood cultures  pulmonary noted  repeat blood work  ct chest noted , ID/ pulmonary comments  pt with intermittent runs of pat , re started on beta blocker

## 2022-02-05 NOTE — PROGRESS NOTE ADULT - SUBJECTIVE AND OBJECTIVE BOX
CARDIOLOGY     PROGRESS  NOTE   ________________________________________________    CHIEF COMPLAINT:Patient is a 86y old  Female who presents with a chief complaint of sob (05 Feb 2022 08:33)  comfortable.  	  REVIEW OF SYSTEMS:  CONSTITUTIONAL: No fever, weight loss, or fatigue  EYES: No eye pain, visual disturbances, or discharge  ENT:  No difficulty hearing, tinnitus, vertigo; No sinus or throat pain  NECK: No pain or stiffness  RESPIRATORY: No cough, wheezing, chills or hemoptysis; +Shortness of Breath  CARDIOVASCULAR: No chest pain, palpitations, passing out, dizziness, or leg swelling  GASTROINTESTINAL: No abdominal or epigastric pain. No nausea, vomiting, or hematemesis; No diarrhea or constipation. No melena or hematochezia.  GENITOURINARY: No dysuria, frequency, hematuria, or incontinence  NEUROLOGICAL: No headaches, memory loss, loss of strength, numbness, or tremors  SKIN: No itching, burning, rashes, or lesions   LYMPH Nodes: No enlarged glands  ENDOCRINE: No heat or cold intolerance; No hair loss  MUSCULOSKELETAL: No joint pain or swelling; No muscle, back, or extremity pain  PSYCHIATRIC: No depression, anxiety, mood swings, or difficulty sleeping  HEME/LYMPH: No easy bruising, or bleeding gums  ALLERGY AND IMMUNOLOGIC: No hives or eczema	    [ ] All others negative	  [ ] Unable to obtain    PHYSICAL EXAM:  T(C): 36.6 (02-05-22 @ 05:47), Max: 36.7 (02-04-22 @ 12:09)  HR: 90 (02-05-22 @ 10:40) (83 - 98)  BP: 125/82 (02-05-22 @ 05:47) (89/45 - 125/82)  RR: 18 (02-05-22 @ 05:47) (18 - 18)  SpO2: 96% (02-05-22 @ 10:40) (93% - 99%)  Wt(kg): --  I&O's Summary    04 Feb 2022 07:01  -  05 Feb 2022 07:00  --------------------------------------------------------  IN: 600 mL / OUT: 750 mL / NET: -150 mL        Appearance: Normal	  HEENT:   Normal oral mucosa, PERRL, EOMI	  Lymphatic: No lymphadenopathy  Cardiovascular: Normal S1 S2, No JVD,+ murmurs, No edema  Respiratory: rhonchi	  Psychiatry: A & O x 3, Mood & affect appropriate  Gastrointestinal:  Soft, Non-tender, + BS	  Skin: No rashes, No ecchymoses, No cyanosis	  Neurologic: Non-focal  Extremities: Normal range of motion, No clubbing, cyanosis or edema  Vascular: Peripheral pulses palpable 2+ bilaterally    MEDICATIONS  (STANDING):  albuterol/ipratropium for Nebulization 3 milliLiter(s) Nebulizer every 6 hours  allopurinol 100 milliGRAM(s) Oral daily  artificial  tears Solution 1 Drop(s) Both EYES three times a day  atorvastatin 20 milliGRAM(s) Oral at bedtime  chlorhexidine 2% Cloths 1 Application(s) Topical <User Schedule>  guaiFENesin Oral Liquid (Sugar-Free) 300 milliGRAM(s) Oral every 6 hours  heparin   Injectable 5000 Unit(s) SubCutaneous every 8 hours  levETIRAcetam  Solution 750 milliGRAM(s) Oral two times a day  levothyroxine 75 MICROGram(s) Oral daily  metoprolol tartrate 25 milliGRAM(s) Oral two times a day  nystatin Powder 1 Application(s) Topical two times a day  ondansetron Injectable 4 milliGRAM(s) IV Push once  sodium chloride 3%  Inhalation 4 milliLiter(s) Inhalation every 6 hours      TELEMETRY: 	    ECG:  	  RADIOLOGY:  OTHER: 	  	  LABS:	 	    CARDIAC MARKERS:                  proBNP: Serum Pro-Brain Natriuretic Peptide: 148 pg/mL (01-20 @ 08:53)    Lipid Profile:   HgA1c:   TSH: Thyroid Stimulating Hormone, Serum: 2.39 uIU/mL (01-07 @ 09:13)          Assessment and plan  ---------------------------  87 y/o female, hx of CVA, MI, breast CA, COPD, not on home 02, s/p  recent  perforated  appendix / abscess,  HN, HLD, hypothyroid,  obesity relatively  bed bound , gout   BIBEMS from [facility] due to SOB., started this morning   patient initial 02 sat was 84% placed on NRB and improved to 93%.  has a pic line to left arm ,  and  peg tube  pt had a long course of admission sec to mucus plug and collapsed lung, s/p bronchoscopy.  ct angio of the chest noted, sig abnormality with collapse of the lung  small pleural effusion, doubt chf  agree with iv abx  dvt prophylaxis  pulmonary noted  ecg noted with old inferior wall mi, no acute changes  continue current meds  continue current bp meds  echo noted  remained in NSR   sob sec to underlying lung disease  dvt prophylaxis  continue abx, fu blood cultures  pulmonary noted  repeat blood work  ct chest noted , ID/ pulmonary comments  pt with intermittent runs of pat , re started on beta blocker  oob to chair as tolerated

## 2022-02-05 NOTE — PROGRESS NOTE ADULT - ASSESSMENT
86   year old female    h/o hemorrhagic CVA, has  PEG,  HTN, MI,   HLD, gout, right ca  breast   right Ca breast. s/p mastectomy in 2019,  s/p  sentinel node  localization.  4/4,  were  negative  peg dislodged.  IR   replacements  on 1/3/22   picc  and iv ab  for 6 weeks, per  ID, on  last  visit  s/p rrt   for hypoxia. cxr with complete  collapsed  of  left lung, needing broch, by  pulm magnus mir   s/ p  bronchoscopy , pt  with  tracheomalacia  and  collapsed  airways,  makes  this a  difficult  situation, as there is no good rx for this          *  p/w   sob. acute  resp failure  on Bipap,  from left   lung  collapse  afberile,  with normal  wbc  on arrival  *   s/p cva, has  PEG,  npo  ,  on  synthroid, Keppra  ,  *  HTN, on meds  per  card  *  h/o ca breast. /, s/p  R  mastectomy  * obesity, bmi  was   41, now  is  34 in  er  left  leg contracture ,  remains  bed  bound. functional  paraplegias  needs  assistance  for  all her  adl's  *   h/o bacteremia. on 12/19/21,  Staph epi, meth R,  from perforated  appendicitis  ct  c/a/p, from last  visit   pna, perforated  appendicitis,  ?  mets  to  acetabulum, was  seen by dr pahceco   surg/ IR  and  oncology eval dr pacheco   sa w pt.  no intervention/  son awrae     and  also, with  prior ,  echo, vegetation on  aortic  valve/ endocarditis,   and  per  card, pt is  at  high risk  for  esdras    per card , pt high risk for esdras  and given that . this will not alter rx. pt  will need   extended  course  of ab     on iv  vanco and ertapenem.  till  2/9/.22  ID dr reagan, and per  surg  and  IR  eval,  no intervnetion  CT  1/20/22, no PE. collection in right side   on Proventil,  Mucomyst and   saline  nebs.   * pt  with  h/o  tracheomalacia  and  collapsed  airways,  makes  this a  difficult  situation, as there is no good rx for this  given pt;s  mlple co morbidities,  pt is  at risk for  re admissions  picc  line/  on nocturnal  awaps  difficult  situation, a s there  is no  good  rx  for  pt's  recurrent lung collapse hypoxia   on lopressor/  on  oxygen    has  been  cleared  by  pulm.  and  re  admission  likely,, given her  airways, and  propensity  for  lung collapse   start  d/c  planning.  on n/c/  cleraed  for   d/c  awiating   d/c  to rehab,  with  nocturnal  awaps    still awiating tranfer  to  n home       per  son, pt is  full code     rad< from: CT Angio Chest PE Protocol w/ IV Cont (01.20.22 @ 10:35) >  IMPRESSION:  Limited exam for pulmonary embolism; no central pulmonary embolism with   nondiagnostic evaluation of lobar, segmental and subsegmental branches.   No imaging evidence of right heart strain.  Complete lingular and left lower lobe collapse withsuperimposed   bronchial impaction. Small left pleural effusion. Mild patchy right   apical groundglass may be related to pulmonary edema, infection or   inflammation.  Multilocular 4.0 x 2.2 cm right lower quadrant fluid collection in the   regionof previously perforated appendix, status post drain removal.   Periappendiceal fat stranding has largely resolved.  Left PICC crosses midline and terminates in the right innominate vein,   likely repositioned during the administration of contrast between    imaging and CTA acquisition.  Cholelithiasis and choledocholithiasis. No intrahepatic biliary ductal   dilatation.  --- End of Report ---  < end of copied text >

## 2022-02-05 NOTE — PROGRESS NOTE ADULT - SUBJECTIVE AND OBJECTIVE BOX
afberile    REVIEW OF SYSTEMS:  GEN: no fever,    no chills  RESP: no SOB,   no cough  CVS: no chest pain,   no palpitations  GI: no abdominal pain,   no nausea,   no vomiting,   no constipation,   no diarrhea  : no dysuria,   no frequency  NEURO: no headache,   no dizziness  PSYCH: no depression,   not anxious  Derm : no rash    MEDICATIONS  (STANDING):  albuterol/ipratropium for Nebulization 3 milliLiter(s) Nebulizer every 6 hours  allopurinol 100 milliGRAM(s) Oral daily  artificial  tears Solution 1 Drop(s) Both EYES three times a day  atorvastatin 20 milliGRAM(s) Oral at bedtime  chlorhexidine 2% Cloths 1 Application(s) Topical <User Schedule>  guaiFENesin Oral Liquid (Sugar-Free) 300 milliGRAM(s) Oral every 6 hours  heparin   Injectable 5000 Unit(s) SubCutaneous every 8 hours  levETIRAcetam  Solution 750 milliGRAM(s) Oral two times a day  levothyroxine 75 MICROGram(s) Oral daily  metoprolol tartrate 25 milliGRAM(s) Oral two times a day  nystatin Powder 1 Application(s) Topical two times a day  sodium chloride 3%  Inhalation 4 milliLiter(s) Inhalation every 6 hours    MEDICATIONS  (PRN):  acetaminophen    Suspension .. 650 milliGRAM(s) Oral every 6 hours PRN Temp greater or equal to 38C (100.4F), Mild Pain (1 - 3)      Vital Signs Last 24 Hrs  T(C): 36.6 (05 Feb 2022 05:47), Max: 36.7 (04 Feb 2022 12:09)  T(F): 97.8 (05 Feb 2022 05:47), Max: 98.1 (04 Feb 2022 21:15)  HR: 90 (05 Feb 2022 06:15) (78 - 98)  BP: 125/82 (05 Feb 2022 05:47) (89/45 - 125/82)  BP(mean): --  RR: 18 (05 Feb 2022 05:47) (18 - 18)  SpO2: 96% (05 Feb 2022 06:15) (93% - 99%)  CAPILLARY BLOOD GLUCOSE        I&O's Summary    04 Feb 2022 07:01  -  05 Feb 2022 07:00  --------------------------------------------------------  IN: 600 mL / OUT: 750 mL / NET: -150 mL        PHYSICAL EXAM:  HEAD:  Atraumatic, Normocephalic  NECK: Supple, No   JVD  CHEST/LUNG:   no     rales,     no,    rhonchi  HEART: Regular rate and rhythm;         murmur  ABDOMEN: Soft, Nontender, ;   EXTREMITIES:     no   edema  NEUROLOGY:  alert    LABS:                      02-04 @ 08:12  4.3  40      Thyroid Stimulating Hormone, Serum: 2.39 uIU/mL (01-07 @ 09:13)          Consultant(s) Notes Reviewed:      Care Discussed with Consultants/Other Providers:

## 2022-02-06 NOTE — PROGRESS NOTE ADULT - SUBJECTIVE AND OBJECTIVE BOX
CARDIOLOGY     PROGRESS  NOTE   ________________________________________________    CHIEF COMPLAINT:Patient is a 86y old  Female who presents with a chief complaint of sob (06 Feb 2022 07:31)  doing better.  	  REVIEW OF SYSTEMS:  CONSTITUTIONAL: No fever, weight loss, or fatigue  EYES: No eye pain, visual disturbances, or discharge  ENT:  No difficulty hearing, tinnitus, vertigo; No sinus or throat pain  NECK: No pain or stiffness  RESPIRATORY: No cough, wheezing, chills or hemoptysis; decrease  Shortness of Breath  CARDIOVASCULAR: No chest pain, palpitations, passing out, dizziness, or leg swelling  GASTROINTESTINAL: No abdominal or epigastric pain. No nausea, vomiting, or hematemesis; No diarrhea or constipation. No melena or hematochezia.  GENITOURINARY: No dysuria, frequency, hematuria, or incontinence  NEUROLOGICAL: No headaches, memory loss, loss of strength, numbness, or tremors  SKIN: No itching, burning, rashes, or lesions   LYMPH Nodes: No enlarged glands  ENDOCRINE: No heat or cold intolerance; No hair loss  MUSCULOSKELETAL: No joint pain or swelling; No muscle, back, or extremity pain  PSYCHIATRIC: No depression, anxiety, mood swings, or difficulty sleeping  HEME/LYMPH: No easy bruising, or bleeding gums  ALLERGY AND IMMUNOLOGIC: No hives or eczema	    [ ] All others negative	  [ ] Unable to obtain    PHYSICAL EXAM:  T(C): 36.4 (02-06-22 @ 04:47), Max: 36.6 (02-05-22 @ 12:55)  HR: 90 (02-06-22 @ 04:47) (77 - 92)  BP: 128/77 (02-06-22 @ 04:47) (96/60 - 135/74)  RR: 18 (02-06-22 @ 04:47) (16 - 18)  SpO2: 94% (02-06-22 @ 04:47) (94% - 97%)  Wt(kg): --  I&O's Summary    05 Feb 2022 07:01  -  06 Feb 2022 07:00  --------------------------------------------------------  IN: 1150 mL / OUT: 575 mL / NET: 575 mL        Appearance: Normal	  HEENT:   Normal oral mucosa, PERRL, EOMI	  Lymphatic: No lymphadenopathy  Cardiovascular: Normal S1 S2, No JVD, + murmurs, No edema  Respiratory: rhonchi	  Gastrointestinal:  Soft, Non-tender, + BS	  Skin: No rashes, No ecchymoses, No cyanosis	  Neurologic: Non-focal  Extremities: Normal range of motion, No clubbing, cyanosis or edema  Vascular: Peripheral pulses palpable 2+ bilaterally    MEDICATIONS  (STANDING):  albuterol/ipratropium for Nebulization 3 milliLiter(s) Nebulizer every 6 hours  allopurinol 100 milliGRAM(s) Oral daily  artificial  tears Solution 1 Drop(s) Both EYES three times a day  atorvastatin 20 milliGRAM(s) Oral at bedtime  chlorhexidine 2% Cloths 1 Application(s) Topical <User Schedule>  guaiFENesin Oral Liquid (Sugar-Free) 300 milliGRAM(s) Oral every 6 hours  heparin   Injectable 5000 Unit(s) SubCutaneous every 8 hours  levETIRAcetam  Solution 750 milliGRAM(s) Oral two times a day  levothyroxine 75 MICROGram(s) Oral daily  metoprolol tartrate 25 milliGRAM(s) Oral two times a day  nystatin Powder 1 Application(s) Topical two times a day  ondansetron Injectable 4 milliGRAM(s) IV Push once  sodium chloride 3%  Inhalation 4 milliLiter(s) Inhalation every 6 hours      TELEMETRY: 	    ECG:  	  RADIOLOGY:  OTHER: 	  	  LABS:	 	    CARDIAC MARKERS:                  proBNP: Serum Pro-Brain Natriuretic Peptide: 148 pg/mL (01-20 @ 08:53)    Lipid Profile:   HgA1c:   TSH:         Assessment and plan  ---------------------------  85 y/o female, hx of CVA, MI, breast CA, COPD, not on home 02, s/p  recent  perforated  appendix / abscess,  HN, HLD, hypothyroid,  obesity relatively  bed bound , gout   BIBEMS from [facility] due to SOB., started this morning   patient initial 02 sat was 84% placed on NRB and improved to 93%.  has a pic line to left arm ,  and  peg tube  pt had a long course of admission sec to mucus plug and collapsed lung, s/p bronchoscopy.  ct angio of the chest noted, sig abnormality with collapse of the lung  small pleural effusion, doubt chf  agree with iv abx  dvt prophylaxis  pulmonary noted  ecg noted with old inferior wall mi, no acute changes  continue current meds  continue current bp meds  echo noted  remained in NSR   sob sec to underlying lung disease  dvt prophylaxis  continue abx, fu blood cultures  pulmonary noted  repeat blood work  ct chest noted , ID/ pulmonary comments  pt with intermittent runs of pat , re started on beta blocker  oob to chair as tolerated

## 2022-02-06 NOTE — PROGRESS NOTE ADULT - ASSESSMENT
86   year old female    h/o hemorrhagic CVA, has  PEG,  HTN, MI,   HLD, gout, right ca  breast   right Ca breast. s/p mastectomy in 2019,  s/p  sentinel node  localization.  4/4,  were  negative  peg dislodged.  IR   replacements  on 1/3/22   picc  and iv ab  for 6 weeks, per  ID, on  last  visit  s/p rrt   for hypoxia. cxr with complete  collapsed  of  left lung, needing broch, by  pulm magnus mir   s/ p  bronchoscopy , pt  with  tracheomalacia  and  collapsed  airways,  makes  this a  difficult  situation, as there is no good rx for this          *  p/w   sob. acute  resp failure  on Bipap,  from left   lung  collapse  afberile,  with normal  wbc  on arrival  *   s/p cva, has  PEG,  npo  ,  on  synthroid, Keppra  ,  *  HTN, on meds  per  card  *  h/o ca breast. /, s/p  R  mastectomy  * obesity, bmi  was   41, now  is  34 in  er  left  leg contracture ,  remains  bed  bound. functional  paraplegias  needs  assistance  for  all her  adl's  *   h/o bacteremia. on 12/19/21,  Staph epi, meth R,  from perforated  appendicitis  ct  c/a/p, from last  visit   pna, perforated  appendicitis,  ?  mets  to  acetabulum, was  seen by dr pacheco   surg/ IR  and  oncology eval dr pacheco   sa w pt.  no intervention/  son awrae     and  also, with  prior ,  echo, vegetation on  aortic  valve/ endocarditis,   and  per  card, pt is  at  high risk  for  esdras    per card , pt high risk for esdras  and given that . this will not alter rx. pt  will need   extended  course  of ab     on iv  vanco and ertapenem.  till  2/9/.22  ID dr reagan, and per  surg  and  IR  eval,  no intervnetion  CT  1/20/22, no PE. collection in right side   on Proventil,  Mucomyst and   saline  nebs.   * pt  with  h/o  tracheomalacia  and  collapsed  airways,  makes  this a  difficult  situation, as there is no good rx for this  given pt;s  mlple co morbidities,  pt is  at risk for  re admissions  picc  line/  on nocturnal  awaps  difficult  situation, a s there  is no  good  rx  for  pt's  recurrent lung collapse hypoxia   on lopressor/  on  oxygen    has  been  cleared  by  pulm.  and  re  admission  likely,, given her  airways, and  propensity  for  lung collapse   start  d/c  planning.  on n/c/  cleraed  for   d/c  awiating   d/c  to rehab,  with  nocturnal  awaps    still awiating tranfer  to  n home. ,  dependent  on oxygen/  labs  today       per  son, pt is  full code     rad< from: CT Angio Chest PE Protocol w/ IV Cont (01.20.22 @ 10:35) >  IMPRESSION:  Limited exam for pulmonary embolism; no central pulmonary embolism with   nondiagnostic evaluation of lobar, segmental and subsegmental branches.   No imaging evidence of right heart strain.  Complete lingular and left lower lobe collapse withsuperimposed   bronchial impaction. Small left pleural effusion. Mild patchy right   apical groundglass may be related to pulmonary edema, infection or   inflammation.  Multilocular 4.0 x 2.2 cm right lower quadrant fluid collection in the   regionof previously perforated appendix, status post drain removal.   Periappendiceal fat stranding has largely resolved.  Left PICC crosses midline and terminates in the right innominate vein,   likely repositioned during the administration of contrast between    imaging and CTA acquisition.  Cholelithiasis and choledocholithiasis. No intrahepatic biliary ductal   dilatation.  --- End of Report ---  < end of copied text >                    86   year old female    h/o hemorrhagic CVA, has  PEG,  HTN, MI,   HLD, gout, right ca  breast   right Ca breast. s/p mastectomy in 2019,  s/p  sentinel node  localization.  4/4,  were  negative  peg dislodged.  IR   replacements  on 1/3/22   picc  and iv ab  for 6 weeks, per  ID, on  last  visit  s/p rrt   for hypoxia. cxr with complete  collapsed  of  left lung, needing broch, by  pulm magnus mir   s/ p  bronchoscopy , pt  with  tracheomalacia  and  collapsed  airways,  makes  this a  difficult  situation, as there is no good rx for this          *  p/w   sob. acute  resp failure  on Bipap,  from left   lung  collapse  afberile,  with normal  wbc  on arrival  *   s/p cva, has  PEG,  npo  ,  on  synthroid, Keppra  ,  *  HTN, on meds  per  card  *  h/o ca breast. /, s/p  R  mastectomy  * obesity, bmi  was   41, now  is  34 in  er  left  leg contracture ,  remains  bed  bound. functional  paraplegias  needs  assistance  for  all her  adl's  *   h/o bacteremia. on 12/19/21,  Staph epi, meth R,  from perforated  appendicitis  ct  c/a/p, from last  visit   pna, perforated  appendicitis,  ?  mets  to  acetabulum, was  seen by dr pacheco   surg/ IR  and  oncology eval dr pacheco   sa w pt.  no intervention/  son awrae     and  also, with  prior ,  echo, vegetation on  aortic  valve/ endocarditis,   and  per  card, pt is  at  high risk  for  esdras    per card , pt high risk for esdras  and given that . this will not alter rx. pt  will need   extended  course  of ab     on iv  vanco and ertapenem.  till  2/9/.22  ID dr reagan, and per  surg  and  IR  eval,  no intervnetion  CT  1/20/22, no PE. collection in right side   on Proventil,  Mucomyst and   saline  nebs.   * pt  with  h/o  tracheomalacia  and  collapsed  airways,  makes  this a  difficult  situation, as there is no good rx for this  given pt;s  mlple co morbidities,  pt is  at risk for  re admissions  picc  line/  on nocturnal  awaps  difficult  situation, a s there  is no  good  rx  for  pt's  recurrent lung collapse hypoxia   on lopressor/  on  oxygen    has  been  cleared  by  pulm.  and  re  admission  likely,, given her  airways, and  propensity  for  lung collapse   start  d/c  planning.  on n/c/  cleraed  for   d/c  awiating   d/c  to rehab,  with  nocturnal  awaps/  now  changed to  bipap  per pulm, to ease  transfer to  n home    still awiating  transfer   to  n home. ,  dependent  on oxygen/        per  son, pt is  full code     rad< from: CT Angio Chest PE Protocol w/ IV Cont (01.20.22 @ 10:35) >  IMPRESSION:  Limited exam for pulmonary embolism; no central pulmonary embolism with   nondiagnostic evaluation of lobar, segmental and subsegmental branches.   No imaging evidence of right heart strain.  Complete lingular and left lower lobe collapse withsuperimposed   bronchial impaction. Small left pleural effusion. Mild patchy right   apical groundglass may be related to pulmonary edema, infection or   inflammation.  Multilocular 4.0 x 2.2 cm right lower quadrant fluid collection in the   regionof previously perforated appendix, status post drain removal.   Periappendiceal fat stranding has largely resolved.  Left PICC crosses midline and terminates in the right innominate vein,   likely repositioned during the administration of contrast between    imaging and CTA acquisition.  Cholelithiasis and choledocholithiasis. No intrahepatic biliary ductal   dilatation.  --- End of Report ---  < end of copied text >

## 2022-02-06 NOTE — PROGRESS NOTE ADULT - SUBJECTIVE AND OBJECTIVE BOX
afberile  REVIEW OF SYSTEMS:  GEN: no fever,    no chills  RESP: no SOB,   no cough  CVS: no chest pain,   no palpitations  GI: no abdominal pain,   no nausea,   no vomiting,   no constipation,   no diarrhea  : no dysuria,   no frequency  NEURO: no headache,   no dizziness  PSYCH: no depression,   not anxious  Derm : no rash    MEDICATIONS  (STANDING):  albuterol/ipratropium for Nebulization 3 milliLiter(s) Nebulizer every 6 hours  allopurinol 100 milliGRAM(s) Oral daily  artificial  tears Solution 1 Drop(s) Both EYES three times a day  atorvastatin 20 milliGRAM(s) Oral at bedtime  chlorhexidine 2% Cloths 1 Application(s) Topical <User Schedule>  guaiFENesin Oral Liquid (Sugar-Free) 300 milliGRAM(s) Oral every 6 hours  heparin   Injectable 5000 Unit(s) SubCutaneous every 8 hours  levETIRAcetam  Solution 750 milliGRAM(s) Oral two times a day  levothyroxine 75 MICROGram(s) Oral daily  metoprolol tartrate 25 milliGRAM(s) Oral two times a day  nystatin Powder 1 Application(s) Topical two times a day  ondansetron Injectable 4 milliGRAM(s) IV Push once  sodium chloride 3%  Inhalation 4 milliLiter(s) Inhalation every 6 hours    MEDICATIONS  (PRN):  acetaminophen    Suspension .. 650 milliGRAM(s) Oral every 6 hours PRN Temp greater or equal to 38C (100.4F), Mild Pain (1 - 3)      Vital Signs Last 24 Hrs  T(C): 36.4 (06 Feb 2022 04:47), Max: 36.6 (05 Feb 2022 12:55)  T(F): 97.5 (06 Feb 2022 04:47), Max: 97.9 (05 Feb 2022 12:55)  HR: 90 (06 Feb 2022 04:47) (77 - 92)  BP: 128/77 (06 Feb 2022 04:47) (96/60 - 135/74)  BP(mean): --  RR: 18 (06 Feb 2022 04:47) (16 - 18)  SpO2: 94% (06 Feb 2022 04:47) (94% - 97%)  CAPILLARY BLOOD GLUCOSE        I&O's Summary    05 Feb 2022 07:01 - 06 Feb 2022 07:00  --------------------------------------------------------  IN: 1150 mL / OUT: 575 mL / NET: 575 mL        PHYSICAL EXAM:  HEAD:  Atraumatic, Normocephalic  NECK: Supple, No   JVD  CHEST/LUNG:   no     rales,     no,    rhonchi  HEART: Regular rate and rhythm;         murmur  ABDOMEN: Soft, Nontender, ;   EXTREMITIES:     no   edema  NEUROLOGY:  alert    LABS:                      02-04 @ 08:12  4.3  40      Thyroid Stimulating Hormone, Serum: 2.39 uIU/mL (01-07 @ 09:13)          Consultant(s) Notes Reviewed:      Care Discussed with Consultants/Other Providers:

## 2022-02-07 NOTE — PROGRESS NOTE ADULT - SUBJECTIVE AND OBJECTIVE BOX
[facility] - PROGRESS NOTE    CHIEF COMPLAINT: acute hypoxic respiratory failure; mucous plugging; left lung atelectasis; weak cough; dysphagia; tracheobronchomalacia; PEG;     INTERVAL HISTORY: off isolation following exposure to a COVID (+) staff member; has completed a 6 week course of antibiotics for perforated appendicitis; awake and alert; no shortness of breath or hypoxemia on a nasal canula @ 4lpm; wore BIPAP overnight; no cough, sputum production, hemoptysis, chest congestion or wheeze; no fevers, chills or sweats; no chest pain/pressure or palpitations; bedbound; CT scan  -> mild progression of left lower lobe collapse, lingular collapse and partial left upper lobe collapse; eager to go back to [facility] where she usually eats and participates with rehab;    REVIEW OF SYSTEMS:  Constitutional: As per interval history  HEENT: Within normal limits  CV: As per interval history  Resp: As per interval history  GI: dysphagia -> PEG  : Within normal limits  Musculoskeletal: Within normal limits  Skin: Within normal limits  Neurological: CVA - > left sided weakness/plegia  Psychiatric: Within normal limits  Endocrine: Within normal limits  Hematologic/Lymphatic: Within normal limits  Allergic/Immunologic: Within normal limits    MEDICATIONS:     Pulmonary "  albuterol/ipratropium for Nebulization 3 milliLiter(s) Nebulizer every 6 hours  sodium chloride 3%  Inhalation 4 milliLiter(s) Inhalation every 6 hours    Anti-microbials:    Cardiovascular:  metoprolol tartrate 25 milliGRAM(s) Oral two times a day    Other:  allopurinol 100 milliGRAM(s) Oral daily  atorvastatin 20 milliGRAM(s) Oral at bedtime  chlorhexidine 2% Cloths 1 Application(s) Topical <User Schedule>  heparin   Injectable 5000 Unit(s) SubCutaneous every 8 hours  levETIRAcetam  Solution 750 milliGRAM(s) Oral two times a day  levothyroxine 75 MICROGram(s) Oral daily  nystatin Powder 1 Application(s) Topical two times a day    MEDICATIONS  (PRN):  acetaminophen    Suspension .. 650 milliGRAM(s) Oral every 6 hours PRN Temp greater or equal to 38C (100.4F), Mild Pain (1 - 3)    OBJECTIVE:     Daily Weight in k.2 (2022 04:25)    PHYSICAL EXAM:       ICU Vital Signs Last 24 Hrs  T(C): 36.6 (2022 04:25), Max: 36.6 (2022 12:00)  T(F): 97.8 (2022 04:25), Max: 97.8 (2022 12:00)  HR: 91 (2022 04:25) (63 - 92)  BP: 114/70 (2022 04:25) (110/65 - 114/70)  BP(mean): --  ABP: --  ABP(mean): --  RR: 18 (2022 04:25) (18 - 18)  SpO2: 89% (2022 04:25) (89% - 97%) on 4lpm nasal canula     General: Awake and alert. Cooperative. No distress Appears stated age. In bed.  HEENT: Atraumatic. Normocephalic. Anicteric. Normal oral mucosa. PERRL. EOMI. Mallampati class IV airway.  Neck: Supple. Trachea midline. Thyroid without enlargement/tenderness/nodules. No carotid bruit. No JVD. Short and wide  Cardiovascular: Regular rate and rhythm. S1 S2 normal. III/VI systolic murmur  Respiratory: Respirations unlabored. Decreased breath sounds left hemithorax. No wheeze. No curvature.  Abdomen: Soft. Non-tender. Non-distended. No organomegaly. No masses. Normal bowel sounds. Obese, PEG.  Extremities: Warm to touch. No clubbing or cyanosis. No pedal edema. LUE PICC line.  Pulses: 2+ peripheral pulses all extremities.	  Skin: Normal skin color. No rashes or lesions. No ecchymoses. No cyanosis. Warm to touch.  Lymph Nodes: Cervical, supraclavicular and axillary nodes normal  Neurological: Left lower extremity plegia with contraction. Left upper extremity is quite weak. A and O x 3  Psychiatry: Calm    LABS:               9.0    6.77  )-----------( 215      ( 2022 09:59 )             29.5     CBC    WBC  6.77 <==, 5.38 <==    Hemoglobin  9.0 <<==, 9.2 <<==    Hematocrit  29.5 <==, 30.8 <==    Platelets  215 <==, 204 <==      138  |  101  |  25<H>  ----------------------------<  124<H>    02-06  4.6   |  23  |  0.62    LYTES    sodium  138 <==    potassium   4.6 <==    chloride  101 <==    carbon dioxide  23 <==  =============================================================================================  RENAL FUNCTION:    Creatinine:   0.62  <<==    BUN:   25 <==  ============================================================================================  calcium   9.7 <==  ============================================================================================  Blood Gas Profile - Venous (22 @ 08:12)   pH, Venous: 7.35:  pCO2, Venous: 49 mmHg   pO2, Venous: 40 mmHg   HCO3, Venous: 27 mmol/L   Base Excess, Venous: 1.1 mmol/L   Oxygen Saturation, Venous: 63.5 %   Total CO2, Venous: 29 mmol/L   Blood Gas Source Venous: Venous   ---------------------------------------------------------------------------------------------------------------  Venous Blood Gas:   @ 08:53  7.34/55/52/30/83.0  VBG Lactate: 1.7    Serum Pro-Brain Natriuretic Peptide: 148 pg/mL ( @ 08:53)    CARDIAC MARKERS ( 2022 08:53 )  CPK x     /CKMB x     /CKMB Units x        troponin 21 ng/L    < from: Transthoracic Echocardiogram (21 @ 14:39) >    Patient name: FRANKI MEHTA  YOB: 1935   Age: 86 (F)   MR#: [id number]  Study Date: 2021  Location: [address] [place], [place] [id number]: Tena Garnett RDCS  Study quality: Technically difficult/Limited  Referring Physician: Edmond Almazan MD  Blood Pressure: 134/77 mmHg  Height: 163 cm  Weight: 109 kg  BSA: 2.1 m2  Heart Rate: 105 mmHg  ------------------------------------------------------------------------  Dimensions:    Normal Values:  LA:     3.6    2.0 - 4.0 cm  Ao:     2.7    2.0 - 3.8 cm  SEPTUM: 1.1    0.6 - 1.2 cm  PWT:    1.0    0.6 - 1.1 cm  LVIDd:  3.7    3.0 - 5.6 cm  LVIDs:  2.0    1.8 - 4.0 cm  Derived variables:  LVMI: 60 g/m2  RWT: 0.58  Fractional short: 46 %  EF (Visual Estimate): 70 %  Doppler Peak Velocity (m/sec): AoV=2.5  ------------------------------------------------------------------------  Conclusions:  Normal left ventricular systolic function. No segmental  wall motion abnormalities.  Moderate aortic stenosis.  There may be a vegetation on the left or non-coronary cusp  of the aortic valve. Consider transesophageal  echocardiography.  ------------------------------------------------------------------------  Confirmed on  2021 - 10:32:40 by Rahul Correa M.D.  ------------------------------------------------------------------------    MICROBIOLOGY:     Respiratory Viral Panel with COVID-19 by RYAN (22 @ 08:52)   Rapid RVP Result: [facility]   SARS-CoV-2: [facility]    Urinalysis Basic - ( 2022 14:56 )    Color: Yellow / Appearance: Slightly Turbid / SG: >1.050 / pH: x  Gluc: x / Ketone: Negative  / Bili: Negative / Urobili: Negative   Blood: x / Protein: 100 / Nitrite: Negative   Leuk Esterase: Large / RBC: 7 /hpf / WBC 25 /HPF   Sq Epi: x / Non Sq Epi: 5 /hpf / Bacteria: Few    Culture - Urine (22 @ 16:37)   Culture Results:   >100,000 CFU/ml Enterococcus faecium (vancomycin resistant)   <10,000 CFU/ml Normal Urogenital ck present     Culture - Bronchial (22 @ 18:48)   Culture Results:   Normal Respiratory Ck present     Culture - Abscess with Gram Stain (21 @ 18:49)   Specimen Source: .Abscess abdominal abscess   Culture Results:   Few Escherichia coli   Few Morganella morganii   Moderate Enterococcus avium   Numerous Bacteroides thetaiotamcron group "Susceptibilities not performed"   Numerous Clostridium ramosum "Susceptibilities not performed"     Culture - Blood (21 @ 14:24)   Culture Results:   Growth in aerobic bottle: Staphylococcus epidermidis   Organism Identification: Staphylococcus epidermidis     Culture - Blood (21 @ 14:24)   Gram Stain:   Growth in aerobic bottle: Gram Positive Cocci in Clusters   Growth in anaerobic bottle: Gram Positive Cocci in Clusters   - Staphylococcus epidermidis, Methicillin resistant: Detec     RADIOLOGY:  [x] Chest radiographs reviewed and interpreted by me    EXAM:  XR CHEST PORTABLE URGENT 1V                          PROCEDURE DATE:  2022      Frontal expiratory view of the chest shows the heart to be similar in   size. Left PICC reaches the SVC. Gastric tube and left tracheal curve are   again noted.    The lungs show clear right lung with similar left infiltrate/effusion and   there is no evidence of pneumothorax nor right pleural effusion.    IMPRESSION:  Similar left infiltrate/effusion.    Thank you for the courtesy of this referral.    STACY CRUZ MD; Attending Interventional Radiologist  This document has been electronicallysigned. 2022  2:24PM  ---------------------------------------------------------------------------------------------------------------  EXAM:  CT CHEST                          PROCEDURE DATE:  2022      FINDINGS:    LYMPH NODES: No mediastinal lymphadenopathy.    HEART/VASCULATURE: Heart size is normal. No pericardial effusion. Aortic   and mitral valve calcification. Partially visualized left PICC line   catheter with the tip in the SVC.    AIRWAYS/LUNGS/PLEURA: Complete collapse of the left lower and lingular   lobes with partial collapse of the left upper lobe with bronchial   impaction, progressed compared to prior. There is leftward mediastinal   shift.  Mosaic attenuation of the left upper lobe.  Patchy groundglass opacities at the right apex, similar to prior.  Dependent right lung subsegmental atelectasis.  Stable trace left pleural effusion with intrafissural extension.    UPPER ABDOMEN: Calcified hepatic granuloma.    BONES/SOFT TISSUES: Status post right mastectomy. Status post left   humerus ORIF.    IMPRESSION:    Persistent left lower lobe collapse, lingular collapse and partial left   upper lobe collapse when compared with previous exam. There is mild   progression since the previous study.      Stable patchy groundglass opacities of the right lung apex, suggestive of   infection versus inflammation.    Remaining incidental findings as above.    SHERLEY WATSON MD; Resident Radiologist  This document has been electronically signed.  FAUSTINO PIZANO MD; Attending Radiologist  This document has been electronically signed. 2022  4:36PM  ---------------------------------------------------------------------------------------------------------------   EXAM:  XR CHEST PORTABLE URGENT 1V                          PROCEDURE DATE:  2022      INTERPRETATION:  EXAMINATION: XR CHEST URGENT    CLINICAL INDICATION: Mucus plugging, left lung atelectasis.    TECHNIQUE: Single frontal, portable view of the chest was obtained.    COMPARISON: Chest radiograph 2022.    FINDINGS:    Left PICC tip within SVC. Status post ORIF of the left humerus.  Cannot accurately assess cardiac size in this projection.  Left lower lung consolidation and volume loss is unchanged.  Left pleural effusion. No pneumothorax.    IMPRESSION:  Left lower lung consolidation and volume loss is unchanged.    MOUSTAPHA COURTNEY MD; Resident Radiology  This document has been electronically signed.  GIANNI MANTILLA MD; Attending Radiologist  This document has been electronically signed. 2022  6:32PM  ---------------------------------------------------------------------------------------------------------------  EXAM:  CT ABDOMEN AND PELVIS IC                        EXAM:  CT ANGIO CHEST PULM ART [facility]                          PROCEDURE DATE:  2022      FINDINGS:    CHEST:    PULMONARY ANGIOGRAM: Limited study secondary to extensive respiratory   motion artifact. No evidence of large central pulmonary embolism, with   limited evaluation of lobar, segmental and subsegmental branches. The   pulmonary artery is enlarged measuring 3.3 cm, nonspecific although can   be seen with pulmonary hypertension.    MEDIASTINUM/LYMPH NODES: No mediastinal or hilar lymphadenopathy. Small   hiatal hernia.    AIRWAYS/LUNGS/PLEURA: Degraded by respiratory motion artifact. There is   partial left upper lobe and complete lingular and left lower lobe   collapse with superimposed bronchial impaction. There is a small left   pleural effusion with intrafissural extension. There are patchy   groundglass opacities at the right apex, likely infectious/inflammatory.   No pneumothorax. Mild subpleural reticulation along the right middle lobe   may be secondary to right breast/chest wall radiotherapy.    HEART/VASCULATURE: Heart size is normal. No pericardial effusion. Mild   aortic valvular and mitral annular calcification. A left PICC is present   which has repositioned between the acquisition of the  imaging   (terminating in the SVC on ) and the chest CTA, now crossing midline   into the right innominate vein.    CHEST WALL AND LOWER NECK: The thyroid is incompletely characterized on   this study. Status post right mastectomy.    ABDOMEN AND PELVIS:    LIVER: Within normal limits. Calcified hepatic granuloma.    BILE DUCTS: No intrahepatic biliary ductal dilatation. Redemonstration of   choledocholithiasis in the ampullary region, unchanged since 2021.    GALLBLADDER: Cholelithiasis.    SPLEEN: Within normal limits.    PANCREAS: 1 cm pancreatic tail cyst.    ADRENALS: Within normal limits.    KIDNEYS/URETERS: Mildly atrophic left kidney. No hydronephrosis.    BLADDER: Within normal limits.    REPRODUCTIVE ORGANS: Uterus present. There is a 1.4 cm right adnexal cyst.    BOWEL: Percutaneous gastrostomy tube present with intraluminal balloon.   No bowel obstruction. Colonic diverticulosis without diverticulitis.   Interval removal of the right lower quadrant pigtail catheter in the   region of the appendix. There is a residual, multilocular 4.0 x 2.2 cm   collection with peripheral enhancement (series 3, image 84) in the region   of previously perforated appendix. Previously seen periappendiceal fat   stranding has largely resolved.    PERITONEUM: No ascites.    VESSELS: Aortic calcifications. Hypodense lesion noted at the origin of   the SMA which was previously seen but not as well appreciated on prior   noncontrast enhanced study on 2021. SMA however appears patent.    RETROPERITONEUM/LYMPH NODES: No lymphadenopathy.    ABDOMINAL WALL: . Fat-containing umbilical hernia.    BONES: Remote left proximal humeral fracture status post ORIF. Unchanged   T11 and L1 superior endplate compression deformities.    IMPRESSION:  Limited exam for pulmonary embolism; no central pulmonary embolism with   nondiagnostic evaluation of lobar, segmental and subsegmental branches.   No imaging evidence of right heart strain.    Complete lingular and left lower lobe collapse withsuperimposed   bronchial impaction. Small left pleural effusion. Mild patchy right   apical groundglass may be related to pulmonary edema, infection or   inflammation.    Multilocular 4.0 x 2.2 cm right lower quadrant fluid collection in the   regionof previously perforated appendix, status post drain removal.   Periappendiceal fat stranding has largely resolved.    Left PICC crosses midline and terminates in the right innominate vein,   likely repositioned during the administration of contrast between    imaging and CTA acquisition.    Cholelithiasis and choledocholithiasis. No intrahepatic biliary ductal   dilatation.    MARKO GUAMAN DO; Resident Radiologist  This document has been electronically signed.  SHARATH TURCIOS M.D., Attending Radiologist  This document has been electronically signed. 2022 12:48PM  ---------------------------------------------------------------------------------------------------------------  EXAM:  CT BRAIN                          PROCEDURE DATE:  2022      FINDINGS:    Study is limited by motion.    No hydrocephalus, midline shift, or effacement of the basal cisterns. No   gross evidence for acute intracranial hemorrhage or brain edema.    Complete opacification of the right maxillary sinus and right mastoid air   cells.    IMPRESSION:    Limited by motion.    No gross evidence for acute intracranial hemorrhage or brain edema.    No hydrocephalus or midline shift.    Complete opacification of the right maxillary sinus and right mastoid air   cells. Correlate for the presence of sinusitis/mastoiditis.    LESLI KING MD; Attending Radiologist  This document has been electronically signed. 2022 10:38AM  ---------------------------------------------------------------------------------------------------------------

## 2022-02-07 NOTE — PROGRESS NOTE ADULT - SUBJECTIVE AND OBJECTIVE BOX
afberile  REVIEW OF SYSTEMS:  GEN: no fever,    no chills  RESP: no SOB,   no cough  CVS: no chest pain,   no palpitations  GI: no abdominal pain,   no nausea,   no vomiting,   no constipation,   no diarrhea  : no dysuria,   no frequency  NEURO: no headache,   no dizziness  PSYCH: no depression,   not anxious  Derm : no rash    MEDICATIONS  (STANDING):  albuterol/ipratropium for Nebulization 3 milliLiter(s) Nebulizer every 6 hours  allopurinol 100 milliGRAM(s) Oral daily  artificial  tears Solution 1 Drop(s) Both EYES three times a day  atorvastatin 20 milliGRAM(s) Oral at bedtime  chlorhexidine 2% Cloths 1 Application(s) Topical <User Schedule>  guaiFENesin Oral Liquid (Sugar-Free) 300 milliGRAM(s) Oral every 6 hours  heparin   Injectable 5000 Unit(s) SubCutaneous every 8 hours  levETIRAcetam  Solution 750 milliGRAM(s) Oral two times a day  levothyroxine 75 MICROGram(s) Oral daily  metoprolol tartrate 25 milliGRAM(s) Oral two times a day  nystatin Powder 1 Application(s) Topical two times a day  ondansetron Injectable 4 milliGRAM(s) IV Push once  sodium chloride 3%  Inhalation 4 milliLiter(s) Inhalation every 6 hours    MEDICATIONS  (PRN):  acetaminophen    Suspension .. 650 milliGRAM(s) Oral every 6 hours PRN Temp greater or equal to 38C (100.4F), Mild Pain (1 - 3)      Vital Signs Last 24 Hrs  T(C): 36.6 (07 Feb 2022 04:25), Max: 36.6 (06 Feb 2022 12:00)  T(F): 97.8 (07 Feb 2022 04:25), Max: 97.8 (06 Feb 2022 12:00)  HR: 91 (07 Feb 2022 04:25) (63 - 92)  BP: 114/70 (07 Feb 2022 04:25) (110/65 - 114/70)  BP(mean): --  RR: 18 (07 Feb 2022 04:25) (18 - 18)  SpO2: 89% (07 Feb 2022 04:25) (89% - 97%)  CAPILLARY BLOOD GLUCOSE        I&O's Summary    06 Feb 2022 07:01 - 07 Feb 2022 07:00  --------------------------------------------------------  IN: 600 mL / OUT: 500 mL / NET: 100 mL        PHYSICAL EXAM:  HEAD:  Atraumatic, Normocephalic  NECK: Supple, No   JVD  CHEST/LUNG:   no     rales,     no,    rhonchi  HEART: Regular rate and rhythm;         murmur  ABDOMEN: Soft, Nontender, ;   EXTREMITIES:    no    edema  NEUROLOGY:  alert    LABS:                        9.0    6.77  )-----------( 215      ( 06 Feb 2022 09:59 )             29.5     02-06    138  |  101  |  25<H>  ----------------------------<  124<H>  4.6   |  23  |  0.62    Ca    9.7      06 Feb 2022 09:59                      Thyroid Stimulating Hormone, Serum: 2.39 uIU/mL (01-07 @ 09:13)          Consultant(s) Notes Reviewed:      Care Discussed with Consultants/Other Providers:

## 2022-02-07 NOTE — PROGRESS NOTE ADULT - SUBJECTIVE AND OBJECTIVE BOX
CARDIOLOGY     PROGRESS  NOTE   ________________________________________________    CHIEF COMPLAINT:Patient is a 86y old  Female who presents with a chief complaint of sob (07 Feb 2022 07:27)  no complain.  	  REVIEW OF SYSTEMS:  CONSTITUTIONAL: No fever, weight loss, or fatigue  EYES: No eye pain, visual disturbances, or discharge  ENT:  No difficulty hearing, tinnitus, vertigo; No sinus or throat pain  NECK: No pain or stiffness  RESPIRATORY: No cough, wheezing, chills or hemoptysis; No Shortness of Breath  CARDIOVASCULAR: No chest pain, palpitations, passing out, dizziness, or leg swelling  GASTROINTESTINAL: No abdominal or epigastric pain. No nausea, vomiting, or hematemesis; No diarrhea or constipation. No melena or hematochezia.  GENITOURINARY: No dysuria, frequency, hematuria, or incontinence  NEUROLOGICAL: No headaches, memory loss, loss of strength, numbness, or tremors  SKIN: No itching, burning, rashes, or lesions   LYMPH Nodes: No enlarged glands  ENDOCRINE: No heat or cold intolerance; No hair loss  MUSCULOSKELETAL: No joint pain or swelling; No muscle, back, or extremity pain  PSYCHIATRIC: No depression, anxiety, mood swings, or difficulty sleeping  HEME/LYMPH: No easy bruising, or bleeding gums  ALLERGY AND IMMUNOLOGIC: No hives or eczema	    [ ] All others negative	  [ ] Unable to obtain    PHYSICAL EXAM:  T(C): 36.6 (02-07-22 @ 04:25), Max: 36.6 (02-06-22 @ 12:00)  HR: 91 (02-07-22 @ 04:25) (63 - 92)  BP: 114/70 (02-07-22 @ 04:25) (110/65 - 114/70)  RR: 18 (02-07-22 @ 04:25) (18 - 18)  SpO2: 89% (02-07-22 @ 04:25) (89% - 97%)  Wt(kg): --  I&O's Summary    06 Feb 2022 07:01  -  07 Feb 2022 07:00  --------------------------------------------------------  IN: 600 mL / OUT: 500 mL / NET: 100 mL        Appearance: Normal	  HEENT:   Normal oral mucosa, PERRL, EOMI	  Lymphatic: No lymphadenopathy  Cardiovascular: Normal S1 S2, No JVD, + murmurs, No edema  Respiratory: rhonchi  Psychiatry: A & O x 3, Mood & affect appropriate  Gastrointestinal:  Soft, Non-tender, + BS	  Skin: No rashes, No ecchymoses, No cyanosis	  Neurologic: Non-focal  Extremities: Normal range of motion, No clubbing, cyanosis or edema  Vascular: Peripheral pulses palpable 2+ bilaterally    MEDICATIONS  (STANDING):  albuterol/ipratropium for Nebulization 3 milliLiter(s) Nebulizer every 6 hours  allopurinol 100 milliGRAM(s) Oral daily  atorvastatin 20 milliGRAM(s) Oral at bedtime  chlorhexidine 2% Cloths 1 Application(s) Topical <User Schedule>  heparin   Injectable 5000 Unit(s) SubCutaneous every 8 hours  levETIRAcetam  Solution 750 milliGRAM(s) Oral two times a day  levothyroxine 75 MICROGram(s) Oral daily  metoprolol tartrate 25 milliGRAM(s) Oral two times a day  nystatin Powder 1 Application(s) Topical two times a day  sodium chloride 3%  Inhalation 4 milliLiter(s) Inhalation every 6 hours      TELEMETRY: 	    ECG:  	  RADIOLOGY:  OTHER: 	  	  LABS:	 	    CARDIAC MARKERS:                                9.0    6.77  )-----------( 215      ( 06 Feb 2022 09:59 )             29.5     02-06    138  |  101  |  25<H>  ----------------------------<  124<H>  4.6   |  23  |  0.62    Ca    9.7      06 Feb 2022 09:59      proBNP: Serum Pro-Brain Natriuretic Peptide: 148 pg/mL (01-20 @ 08:53)    Lipid Profile:   HgA1c:   TSH:   I am not hopeful that the left lung will expand due to the multiple issues delineated above - suspect that she will need to be treated with oxygen supplementation to keep saturation greater than 90% accepting the fact that her left lung will be basically non-functional      Assessment and plan  ---------------------------  87 y/o female, hx of CVA, MI, breast CA, COPD, not on home 02, s/p  recent  perforated  appendix / abscess,  HN, HLD, hypothyroid,  obesity relatively  bed bound , gout   BIBEMS from [facility] due to SOB., started this morning   patient initial 02 sat was 84% placed on NRB and improved to 93%.  has a pic line to left arm ,  and  peg tube  pt had a long course of admission sec to mucus plug and collapsed lung, s/p bronchoscopy.  ct angio of the chest noted, sig abnormality with collapse of the lung  small pleural effusion, doubt chf  agree with iv abx  dvt prophylaxis  pulmonary noted  ecg noted with old inferior wall mi, no acute changes  continue current meds  continue current bp meds  echo noted  remained in NSR   sob sec to underlying lung disease  dvt prophylaxis  continue abx, fu blood cultures  pulmonary noted  repeat blood work  ct chest noted , ID/ pulmonary comments  pt with intermittent runs of pat , re started on beta blocker  oob to chair as tolerated  hypoxemia, pulmonar fu, last recommendations noted

## 2022-02-07 NOTE — PROGRESS NOTE ADULT - ASSESSMENT
86   year old female    h/o hemorrhagic CVA, has  PEG,  HTN, MI,   HLD, gout, right ca  breast   right Ca breast. s/p mastectomy in 2019,  s/p  sentinel node  localization.  4/4,  were  negative  peg dislodged.  IR   replacements  on 1/3/22   picc  and iv ab  for 6 weeks, per  ID, on  last  visit  s/p rrt   for hypoxia. cxr with complete  collapsed  of  left lung, needing broch, by  pulm [facility]   s/ p  bronchoscopy , pt  with  tracheomalacia  and  collapsed  airways,  makes  this a  difficult  situation, as there is no good rx for this          *  p/w   sob. acute  resp failure  on Bipap,  from left   lung  collapse  afberile,  with normal  wbc  on arrival  *   s/p cva, has  PEG,  npo  ,  on  synthroid, Keppra  ,  *  HTN, on meds  per  card  *  h/o ca breast. /, s/p  R  mastectomy  * obesity, bmi  was   41, now  is  34 in  er  left  leg contracture ,  remains  bed  bound. functional  paraplegias  needs  assistance  for  all her  adl's  *   h/o bacteremia. on 12/19/21,  Staph epi, meth R,  from perforated  appendicitis  ct  c/a/p, from last  visit   pna, perforated  appendicitis,  ?  mets  to  acetabulum, was  seen by dr pacheco   surg/ IR  and  oncology eval dr pacheco   sa w pt.  no intervention/  son awrae     and  also, with  prior ,  echo, vegetation on  aortic  valve/ endocarditis,   and  per  card, pt is  at  high risk  for  esdras    per card , pt high risk for esdras  and given that . this will not alter rx. pt  will need   extended  course  of ab     on iv  vanco and ertapenem.  till  2/9/.22  ID dr reagan, and per  surg  and  IR  eval,  no intervnetion  CT  1/20/22, no PE. collection in right side   on Proventil,  Mucomyst and   saline  nebs.   * pt  with  h/o  tracheomalacia  and  collapsed  airways,  makes  this a  difficult  situation, as there is no good rx for this  given pt;s  mlple co morbidities,  pt is  at risk for  re admissions  picc  line/  on nocturnal  awaps  difficult  situation, a s there  is no  good  rx  for  pt's  recurrent lung collapse hypoxia   on lopressor/  on  oxygen    has  been  cleared  by  pulm.  and  re  admission  likely,, given her  airways, and  propensity  for  lung collapse   d/c  to rehab,  with  nocturnal  awaps/  now  changed to  bipap  per pulm, to ease  transfer to  n home    still awiating  transfer   to  n home. ,  dependent  on oxygen/    awiating   d/c  for past  week/  labs  stable       per  son, pt is  full code     rad< from: CT Angio Chest PE Protocol w/ IV Cont (01.20.22 @ 10:35) >  IMPRESSION:  Limited exam for pulmonary embolism; no central pulmonary embolism with   nondiagnostic evaluation of lobar, segmental and subsegmental branches.   No imaging evidence of right heart strain.  Complete lingular and left lower lobe collapse withsuperimposed   bronchial impaction. Small left pleural effusion. Mild patchy right   apical groundglass may be related to pulmonary edema, infection or   inflammation.  Multilocular 4.0 x 2.2 cm right lower quadrant fluid collection in the   regionof previously perforated appendix, status post drain removal.   Periappendiceal fat stranding has largely resolved.  Left PICC crosses midline and terminates in the right innominate vein,   likely repositioned during the administration of contrast between    imaging and CTA acquisition.  Cholelithiasis and choledocholithiasis. No intrahepatic biliary ductal   dilatation.  --- End of Report ---  < end of copied text >

## 2022-02-07 NOTE — PROGRESS NOTE ADULT - ASSESSMENT
ASSESSMENT:    86 year old gentlewoman, lifelong non-smoker, without history of intrinsic lung disease. 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 She also has a history of HTN, HLD, CAD s/p MI with preserved LVEF and moderate aortic stenosis. The patient was admitted to [facility] on December 19th 2021 with fever and abdominal pain. She was found to have perforated appendicitis with abscess formation. She was treated with tube drainage and antibiotics for a polymicrobial infection. She continues on vancomycin/ertapenem via a PICC line. Admission CT had debris within the left lobar and more distal airways of the left lower lobe as well as some debris within the right lower lobe airway - there was complete atelectasis of the left lower lobe and subsegmental atelectasis of the left upper lobe and dependent portions of the right lung. The patient developed increased work of breathing, tachycardia, tachypnea and hypoxemia on January 4th due to mucous plugging with complete collapse of the left lung. She underwent bronchoscopy on January 6th with removal of copious amounts of purulent secretions from throughout the bronchial tree - there was severe tracheobronchomalacia. Bronchial cultures were negative. She was treated with bronchodilators, mucolytic nebs, chest vest and nocturnal AVAPS with expansion of the left upper lobe and some reexpansion of the left lower lobe. She was discharged to rehab on January 11th on a 3lpm nasal canula with plans to continue medical and mechanical therapy as well as nocturnal BIPAP. The patient returns from rehab with decreased mental status, dyspnea and hypoxemia treated with 100% NRB. She currently is obtunded on a BIPAP device. CT scan -> partial left upper lobe and complete lingular and left lower lobe collapse with superimposed bronchial impaction - small left pleural effusion with intrafissural extension - patchy groundglass opacities at the right apex. The patient has redeveloped left lung atelectasis due to mucous plugging -> multifactorial. There is an area of inflammation/infection in the right upper lobe  1) tracheobronchomalacia with marked expiratory airway narrowing preventing adequate sputum clearance  2) weak cough following a CVA with marked deconditioning   3) dysphagia with ongoing aspiration of oral secretions  4) moderate aortic stenosis with a small left pleural effusion    2/1 - much more awake and alert; no shortness of breath or hypoxemia on a nasal canula @ 4lpm; wore AVAPS overnight; cough with difficulty expectorating sputum; no hemoptysis, chest congestion or wheeze; no fevers, chills or sweats; no chest pain/pressure or palpitations; bedbound;     2/4 - much more awake and alert; no shortness of breath or hypoxemia on a nasal canula @ 4 - 6lpm; wore AVAPS overnight; cough with difficulty expectorating sputum; no hemoptysis, chest congestion or wheeze; no fevers, chills or sweats; no chest pain/pressure or palpitations; bedbound; states that she tolerated a dysphagia diet at [facility]    2/7 - awake and alert; no shortness of breath or hypoxemia on a nasal canula @ 4 lpm; wore BIPAP overnight; no cough, sputum production, hemoptysis, chest congestion or wheeze; no fevers, chills or sweats; no chest pain/pressure or palpitations; bedbound; states that she tolerated a dysphagia diet at [facility]      PLAN/RECOMMENDATIONS:    stable oxygenation a 4 lpm nasal canula  VBG is without evidence of hypercapnia  CXR 2/4 is with persistent left lower lung field opacification c/w atelectasis  chest CT 1/31 -> mild progression of left lower lobe collapse, lingular collapse and partial left upper lobe collapse  s/p bronchoscopy 1/6 with removal of copious amounts of purulent mucous from throughout the airways - there was severe tracheobronchomalacia -> cultures are negative  holding off with repeat bronchoscopy - she would unlikely be able to be "liberated" from a ventilator if intubated - she would unlikely benefit long term from a repeat bronchoscopy  nocturnal BIPAP to facilitate lung expansion  has completed a prolonged course of vancomycin/ertapenem following a perforated appendicitis with possible  infective endocarditis  albuterol/atrovent nebs q6h  hypertonic saline nebs to facilitate mucous clearance  guaifenesin 300mg 4 times daily via PEG  chest vest/manual chest PT - ok to discontinue if not available at rehab  cardiac meds: lopressor/lipitor  DVT prophylaxis - SQ heparin  allopurinol/keppra/synthroid  abdominal/pelvic CT - percutaneous gastrostomy tube present with intraluminal balloon - colonic diverticulosis without diverticulitis - interval removal of the right lower quadrant pigtail catheter in the region of the appendix - there is a residual, multilocular 4.0 x 2.2 cm collection with peripheral enhancement in the region of the previously perforated appendix - periappendiceal fat stranding has largely resolved - ID/IR/surgery evaluations noted - no intervention is needed    I am not hopeful that the left lung will expand due to the multiple issues delineated above - suspect that she will need to be treated with oxygen supplementation to keep saturation greater than 90% accepting the fact that parts of her left lung will be non-functional    Will follow with you. Plan of care discussed with the patient at bedside, the dedicated floor NP, the patient's son Luis by phone and with Dr. Segundo. No pulmonary objection to discharge to [facility].    Knenedy Marcano MD, [facility]  [phone]  Pulmonary Medicine

## 2022-02-08 NOTE — PROGRESS NOTE ADULT - PROBLEM SELECTOR PROBLEM 1
Perforated appendicitis

## 2022-02-08 NOTE — PROGRESS NOTE ADULT - SUBJECTIVE AND OBJECTIVE BOX
CARDIOLOGY     PROGRESS  NOTE   ________________________________________________    CHIEF COMPLAINT:Patient is a 86y old  Female who presents with a chief complaint of sob (08 Feb 2022 07:23)  no complain.  	  REVIEW OF SYSTEMS:  CONSTITUTIONAL: No fever, weight loss, or fatigue  EYES: No eye pain, visual disturbances, or discharge  ENT:  No difficulty hearing, tinnitus, vertigo; No sinus or throat pain  NECK: No pain or stiffness  RESPIRATORY: No cough, wheezing, chills or hemoptysis; No Shortness of Breath  CARDIOVASCULAR: No chest pain, palpitations, passing out, dizziness, or leg swelling  GASTROINTESTINAL: No abdominal or epigastric pain. No nausea, vomiting, or hematemesis; No diarrhea or constipation. No melena or hematochezia.  GENITOURINARY: No dysuria, frequency, hematuria, or incontinence  NEUROLOGICAL: No headaches, memory loss, loss of strength, numbness, or tremors  SKIN: No itching, burning, rashes, or lesions   LYMPH Nodes: No enlarged glands  ENDOCRINE: No heat or cold intolerance; No hair loss  MUSCULOSKELETAL: No joint pain or swelling; No muscle, back, or extremity pain  PSYCHIATRIC: No depression, anxiety, mood swings, or difficulty sleeping  HEME/LYMPH: No easy bruising, or bleeding gums  ALLERGY AND IMMUNOLOGIC: No hives or eczema	    [ ] All others negative	  [ ] Unable to obtain    PHYSICAL EXAM:  T(C): 36.3 (02-08-22 @ 04:56), Max: 36.7 (02-07-22 @ 21:30)  HR: 90 (02-08-22 @ 06:04) (70 - 98)  BP: 106/69 (02-08-22 @ 05:43) (101/61 - 132/79)  RR: 19 (02-08-22 @ 04:56) (19 - 20)  SpO2: 96% (02-08-22 @ 06:04) (90% - 96%)  Wt(kg): --  I&O's Summary    07 Feb 2022 07:01  -  08 Feb 2022 07:00  --------------------------------------------------------  IN: 600 mL / OUT: 600 mL / NET: 0 mL        Appearance: Normal	  HEENT:   Normal oral mucosa, PERRL, EOMI	  Lymphatic: No lymphadenopathy  Cardiovascular: Normal S1 S2, No JVD, + murmurs, No edema  Respiratory: Lungs clear to auscultation	  Psychiatry: A & O x 3, Mood & affect appropriate  Gastrointestinal:  Soft, Non-tender, + BS	  Skin: No rashes, No ecchymoses, No cyanosis	  Neurologic: Non-focal  Extremities: Normal range of motion, No clubbing, cyanosis or edema  Vascular: Peripheral pulses palpable 2+ bilaterally    MEDICATIONS  (STANDING):  albuterol/ipratropium for Nebulization 3 milliLiter(s) Nebulizer every 6 hours  allopurinol 100 milliGRAM(s) Oral daily  atorvastatin 20 milliGRAM(s) Oral at bedtime  chlorhexidine 2% Cloths 1 Application(s) Topical <User Schedule>  guaiFENesin Oral Liquid (Sugar-Free) 300 milliGRAM(s) Oral every 6 hours  heparin   Injectable 5000 Unit(s) SubCutaneous every 8 hours  levETIRAcetam  Solution 750 milliGRAM(s) Oral two times a day  levothyroxine 75 MICROGram(s) Oral daily  metoprolol tartrate 25 milliGRAM(s) Oral two times a day  nystatin Powder 1 Application(s) Topical two times a day  sodium chloride 3%  Inhalation 4 milliLiter(s) Inhalation every 6 hours      TELEMETRY: 	    ECG:  	  RADIOLOGY:  OTHER: 	  	  LABS:	 	    CARDIAC MARKERS:                                9.0    6.77  )-----------( 215      ( 06 Feb 2022 09:59 )             29.5     02-06    138  |  101  |  25<H>  ----------------------------<  124<H>  4.6   |  23  |  0.62    Ca    9.7      06 Feb 2022 09:59      proBNP: Serum Pro-Brain Natriuretic Peptide: 148 pg/mL (01-20 @ 08:53)    Lipid Profile:   HgA1c:   TSH:         Assessment and plan  ---------------------------  85 y/o female, hx of CVA, MI, breast CA, COPD, not on home 02, s/p  recent  perforated  appendix / abscess,  HN, HLD, hypothyroid,  obesity relatively  bed bound , gout   BIBEMS from [facility] due to SOB., started this morning   patient initial 02 sat was 84% placed on NRB and improved to 93%.  has a pic line to left arm ,  and  peg tube  pt had a long course of admission sec to mucus plug and collapsed lung, s/p bronchoscopy.  ct angio of the chest noted, sig abnormality with collapse of the lung  small pleural effusion, doubt chf  agree with iv abx  dvt prophylaxis  pulmonary noted  ecg noted with old inferior wall mi, no acute changes  continue current meds  continue current bp meds  echo noted  remained in NSR   sob sec to underlying lung disease  dvt prophylaxis  continue abx, fu blood cultures  pulmonary noted  repeat blood work  ct chest noted , ID/ pulmonary comments  pt with intermittent runs of pat , re started on beta blocker  oob to chair as tolerated  hypoxemia, pulmonar fu, last recommendations noted  dc planning

## 2022-02-08 NOTE — PROGRESS NOTE ADULT - SUBJECTIVE AND OBJECTIVE BOX
FRANKI MEHTA 86y [id number]    Patient is a 86y old  Female who presents with a chief complaint of sob (08 Feb 2022 08:20)      Follow Up/CC:  ID following for bacteremia    Interval History/ROS: no fever, completed abx    Allergies    Toradol (Urticaria (Mild to Mod); Rash (Mild to Mod))    Intolerances        ANTIMICROBIALS:      MEDICATIONS  (STANDING):  albuterol/ipratropium for Nebulization 3 milliLiter(s) Nebulizer every 6 hours  allopurinol 100 milliGRAM(s) Oral daily  atorvastatin 20 milliGRAM(s) Oral at bedtime  chlorhexidine 2% Cloths 1 Application(s) Topical <User Schedule>  guaiFENesin Oral Liquid (Sugar-Free) 300 milliGRAM(s) Oral every 6 hours  heparin   Injectable 5000 Unit(s) SubCutaneous every 8 hours  levETIRAcetam  Solution 750 milliGRAM(s) Oral two times a day  levothyroxine 75 MICROGram(s) Oral daily  metoprolol tartrate 25 milliGRAM(s) Oral two times a day  nystatin Powder 1 Application(s) Topical two times a day  sodium chloride 3%  Inhalation 4 milliLiter(s) Inhalation every 6 hours    MEDICATIONS  (PRN):  acetaminophen    Suspension .. 650 milliGRAM(s) Oral every 6 hours PRN Temp greater or equal to 38C (100.4F), Mild Pain (1 - 3)        Vital Signs Last 24 Hrs  T(C): 36.6 (08 Feb 2022 12:36), Max: 36.7 (07 Feb 2022 21:30)  T(F): 97.8 (08 Feb 2022 12:36), Max: 98 (07 Feb 2022 21:30)  HR: 84 (08 Feb 2022 12:36) (70 - 98)  BP: 94/55 (08 Feb 2022 12:36) (94/55 - 132/79)  BP(mean): --  RR: 18 (08 Feb 2022 12:36) (18 - 20)  SpO2: 95% (08 Feb 2022 12:36) (94% - 96%)                  MICROBIOLOGY:  .Blood Blood-Peripheral  02-07-22   No growth to date.  --  --      Clean Catch Clean Catch (Midstream)  01-20-22   >100,000 CFU/ml Enterococcus faecium (vancomycin resistant)  <10,000 CFU/ml Normal Urogenital ck present  --  Enterococcus faecium (vancomycin resistant)      .Blood Blood-Peripheral  01-20-22   No Growth Final  --  --      RADIOLOGY    < from: Xray Chest 1 View- PORTABLE-Urgent (Xray Chest 1 View- PORTABLE-Urgent .) (02.04.22 @ 08:29) >  Similar left infiltrate/effusion.    < end of copied text >

## 2022-02-08 NOTE — PROGRESS NOTE ADULT - SUBJECTIVE AND OBJECTIVE BOX
[facility] - PROGRESS NOTE    CHIEF COMPLAINT: acute hypoxic respiratory failure; mucous plugging; left lung atelectasis; weak cough; dysphagia; tracheobronchomalacia; PEG;     INTERVAL HISTORY: not discharged awaiting a bed/ride to [facility]; off isolation following exposure to a COVID (+) staff member; has completed a 6 week course of antibiotics for perforated appendicitis; awake and alert; no shortness of breath or hypoxemia on a nasal canula @ 4lpm (currently on room air); wore BIPAP overnight; no cough, sputum production, hemoptysis, chest congestion or wheeze; no fevers, chills or sweats; no chest pain/pressure or palpitations; bedbound; CT scan 1/31 -> mild progression of left lower lobe collapse, lingular collapse and partial left upper lobe collapse; eager to go back to [facility] where she usually eats and participates with rehab;    REVIEW OF SYSTEMS:  Constitutional: As per interval history  HEENT: Within normal limits  CV: As per interval history  Resp: As per interval history  GI: dysphagia -> PEG  : Within normal limits  Musculoskeletal: Within normal limits  Skin: Within normal limits  Neurological: CVA - > left sided weakness/plegia  Psychiatric: Within normal limits  Endocrine: Within normal limits  Hematologic/Lymphatic: Within normal limits  Allergic/Immunologic: Within normal limits    MEDICATIONS:     Pulmonary "  albuterol/ipratropium for Nebulization 3 milliLiter(s) Nebulizer every 6 hours  guaiFENesin Oral Liquid (Sugar-Free) 300 milliGRAM(s) Oral every 6 hours  sodium chloride 3%  Inhalation 4 milliLiter(s) Inhalation every 6 hours    Anti-microbials:    Cardiovascular:  metoprolol tartrate 25 milliGRAM(s) Oral two times a day    Other:  allopurinol 100 milliGRAM(s) Oral daily  atorvastatin 20 milliGRAM(s) Oral at bedtime  chlorhexidine 2% Cloths 1 Application(s) Topical <User Schedule>  heparin   Injectable 5000 Unit(s) SubCutaneous every 8 hours  levETIRAcetam  Solution 750 milliGRAM(s) Oral two times a day  levothyroxine 75 MICROGram(s) Oral daily  nystatin Powder 1 Application(s) Topical two times a day    MEDICATIONS  (PRN):  acetaminophen    Suspension .. 650 milliGRAM(s) Oral every 6 hours PRN Temp greater or equal to 38C (100.4F), Mild Pain (1 - 3)    OBJECTIVE:    PHYSICAL EXAM:       ICU Vital Signs Last 24 Hrs  T(C): 36.3 (08 Feb 2022 04:56), Max: 36.7 (07 Feb 2022 21:30)  T(F): 97.3 (08 Feb 2022 04:56), Max: 98 (07 Feb 2022 21:30)  HR: 90 (08 Feb 2022 06:04) (70 - 98)  BP: 106/69 (08 Feb 2022 05:43) (101/61 - 132/79)  BP(mean): --  ABP: --  ABP(mean): --  RR: 19 (08 Feb 2022 04:56) (19 - 20)  SpO2: 96% (08 Feb 2022 06:04) (90% - 96%)      General: Awake and alert. Cooperative. No distress Appears stated age. In bed.  HEENT: Atraumatic. Normocephalic. Anicteric. Normal oral mucosa. PERRL. EOMI. Mallampati class IV airway.  Neck: Supple. Trachea midline. Thyroid without enlargement/tenderness/nodules. No carotid bruit. No JVD. Short and wide  Cardiovascular: Regular rate and rhythm. S1 S2 normal. III/VI systolic murmur  Respiratory: Respirations unlabored. Decreased breath sounds left hemithorax. No wheeze. No curvature.  Abdomen: Soft. Non-tender. Non-distended. No organomegaly. No masses. Normal bowel sounds. Obese. PEG.  Extremities: Warm to touch. No clubbing or cyanosis. No pedal edema. LUE PICC line.  Pulses: 2+ peripheral pulses all extremities.	  Skin: Normal skin color. No rashes or lesions. No ecchymoses. No cyanosis. Warm to touch.  Lymph Nodes: Cervical, supraclavicular and axillary nodes normal  Neurological: Left lower extremity plegia with contraction. Left upper extremity is quite weak. A and O x 3  Psychiatry: Calm      LABS:                          9.0    6.77  )-----------( 215      ( 06 Feb 2022 09:59 )             29.5     CBC    WBC  6.77 <==    Hemoglobin  9.0 <<==    Hematocrit  29.5 <==    Platelets  215 <==      138  |  101  |  25<H>  ----------------------------<  124<H>    02-06  4.6   |  23  |  0.62      LYTES    sodium  138 <==    potassium   4.6 <==    chloride  101 <==    carbon dioxide  23 <==    =============================================================================================  RENAL FUNCTION:    Creatinine:   0.62  <<==    BUN:   25 <==    ============================================================================================    calcium   9.7 <==    ============================================================================================  Blood Gas Profile - Venous (02.04.22 @ 08:12)   pH, Venous: 7.35:  pCO2, Venous: 49 mmHg   pO2, Venous: 40 mmHg   HCO3, Venous: 27 mmol/L   Base Excess, Venous: 1.1 mmol/L   Oxygen Saturation, Venous: 63.5 %   Total CO2, Venous: 29 mmol/L   Blood Gas Source Venous: Venous   ---------------------------------------------------------------------------------------------------------------  Venous Blood Gas:  01-20 @ 08:53  7.34/55/52/30/83.0  VBG Lactate: 1.7    Serum Pro-Brain Natriuretic Peptide: 148 pg/mL (01-20 @ 08:53)    CARDIAC MARKERS ( 20 Jan 2022 08:53 )  CPK x     /CKMB x     /CKMB Units x        troponin 21 ng/L    < from: Transthoracic Echocardiogram (12.21.21 @ 14:39) >    Patient name: FRANKI MEHTA  YOB: 1935   Age: 86 (F)   MR#: [id number]  Study Date: 12/21/2021  Location: [address] [place], [place] [id number]: Tena Garnett RDCS  Study quality: Technically difficult/Limited  Referring Physician: Edmond Almazan MD  Blood Pressure: 134/77 mmHg  Height: 163 cm  Weight: 109 kg  BSA: 2.1 m2  Heart Rate: 105 mmHg  ------------------------------------------------------------------------  Dimensions:    Normal Values:  LA:     3.6    2.0 - 4.0 cm  Ao:     2.7    2.0 - 3.8 cm  SEPTUM: 1.1    0.6 - 1.2 cm  PWT:    1.0    0.6 - 1.1 cm  LVIDd:  3.7    3.0 - 5.6 cm  LVIDs:  2.0    1.8 - 4.0 cm  Derived variables:  LVMI: 60 g/m2  RWT: 0.58  Fractional short: 46 %  EF (Visual Estimate): 70 %  Doppler Peak Velocity (m/sec): AoV=2.5  ------------------------------------------------------------------------  Conclusions:  Normal left ventricular systolic function. No segmental  wall motion abnormalities.  Moderate aortic stenosis.  There may be a vegetation on the left or non-coronary cusp  of the aortic valve. Consider transesophageal  echocardiography.  ------------------------------------------------------------------------  Confirmed on  12/22/2021 - 10:32:40 by Rahul Correa M.D.  ------------------------------------------------------------------------    MICROBIOLOGY:     Respiratory Viral Panel with COVID-19 by RYAN (01.20.22 @ 08:52)   Rapid RVP Result: [facility]   SARS-CoV-2: [facility]    Urinalysis Basic - ( 20 Jan 2022 14:56 )    Color: Yellow / Appearance: Slightly Turbid / SG: >1.050 / pH: x  Gluc: x / Ketone: Negative  / Bili: Negative / Urobili: Negative   Blood: x / Protein: 100 / Nitrite: Negative   Leuk Esterase: Large / RBC: 7 /hpf / WBC 25 /HPF   Sq Epi: x / Non Sq Epi: 5 /hpf / Bacteria: Few    Culture - Urine (01.20.22 @ 16:37)   Culture Results:   >100,000 CFU/ml Enterococcus faecium (vancomycin resistant)   <10,000 CFU/ml Normal Urogenital ck present     Culture - Bronchial (01.06.22 @ 18:48)   Culture Results:   Normal Respiratory Ck present     Culture - Abscess with Gram Stain (12.23.21 @ 18:49)   Specimen Source: .Abscess abdominal abscess   Culture Results:   Few Escherichia coli   Few Morganella morganii   Moderate Enterococcus avium   Numerous Bacteroides thetaiotamcron group "Susceptibilities not performed"   Numerous Clostridium ramosum "Susceptibilities not performed"     Culture - Blood (12.19.21 @ 14:24)   Culture Results:   Growth in aerobic bottle: Staphylococcus epidermidis   Organism Identification: Staphylococcus epidermidis     Culture - Blood (12.19.21 @ 14:24)   Gram Stain:   Growth in aerobic bottle: Gram Positive Cocci in Clusters   Growth in anaerobic bottle: Gram Positive Cocci in Clusters   - Staphylococcus epidermidis, Methicillin resistant: Detec     RADIOLOGY:  [x] Chest radiographs reviewed and interpreted by me    EXAM:  XR CHEST PORTABLE URGENT 1V                          PROCEDURE DATE:  02/04/2022      Frontal expiratory view of the chest shows the heart to be similar in   size. Left PICC reaches the SVC. Gastric tube and left tracheal curve are   again noted.    The lungs show clear right lung with similar left infiltrate/effusion and   there is no evidence of pneumothorax nor right pleural effusion.    IMPRESSION:  Similar left infiltrate/effusion.    Thank you for the courtesy of this referral.    STACY CRUZ MD; Attending Interventional Radiologist  This document has been electronicallysigned. Feb 4 2022  2:24PM  ---------------------------------------------------------------------------------------------------------------  EXAM:  CT CHEST                          PROCEDURE DATE:  01/31/2022      FINDINGS:    LYMPH NODES: No mediastinal lymphadenopathy.    HEART/VASCULATURE: Heart size is normal. No pericardial effusion. Aortic   and mitral valve calcification. Partially visualized left PICC line   catheter with the tip in the SVC.    AIRWAYS/LUNGS/PLEURA: Complete collapse of the left lower and lingular   lobes with partial collapse of the left upper lobe with bronchial   impaction, progressed compared to prior. There is leftward mediastinal   shift.  Mosaic attenuation of the left upper lobe.  Patchy groundglass opacities at the right apex, similar to prior.  Dependent right lung subsegmental atelectasis.  Stable trace left pleural effusion with intrafissural extension.    UPPER ABDOMEN: Calcified hepatic granuloma.    BONES/SOFT TISSUES: Status post right mastectomy. Status post left   humerus ORIF.    IMPRESSION:    Persistent left lower lobe collapse, lingular collapse and partial left   upper lobe collapse when compared with previous exam. There is mild   progression since the previous study.      Stable patchy groundglass opacities of the right lung apex, suggestive of   infection versus inflammation.    Remaining incidental findings as above.    SHERLEY WATSON MD; Resident Radiologist  This document has been electronically signed.  FAUSTINO PIZANO MD; Attending Radiologist  This document has been electronically signed. Jan 31 2022  4:36PM  ---------------------------------------------------------------------------------------------------------------   EXAM:  XR CHEST PORTABLE URGENT 1V                          PROCEDURE DATE:  01/28/2022      INTERPRETATION:  EXAMINATION: XR CHEST URGENT    CLINICAL INDICATION: Mucus plugging, left lung atelectasis.    TECHNIQUE: Single frontal, portable view of the chest was obtained.    COMPARISON: Chest radiograph 1/27/2022.    FINDINGS:    Left PICC tip within SVC. Status post ORIF of the left humerus.  Cannot accurately assess cardiac size in this projection.  Left lower lung consolidation and volume loss is unchanged.  Left pleural effusion. No pneumothorax.    IMPRESSION:  Left lower lung consolidation and volume loss is unchanged.    MOUSTAPHA COURTNEY MD; Resident Radiology  This document has been electronically signed.  GIANNI MANTILLA MD; Attending Radiologist  This document has been electronically signed. Jan 28 2022  6:32PM  ---------------------------------------------------------------------------------------------------------------  EXAM:  CT ABDOMEN AND PELVIS IC                        EXAM:  CT ANGIO CHEST PULM [facility]                          PROCEDURE DATE:  01/20/2022      FINDINGS:    CHEST:    PULMONARY ANGIOGRAM: Limited study secondary to extensive respiratory   motion artifact. No evidence of large central pulmonary embolism, with   limited evaluation of lobar, segmental and subsegmental branches. The   pulmonary artery is enlarged measuring 3.3 cm, nonspecific although can   be seen with pulmonary hypertension.    MEDIASTINUM/LYMPH NODES: No mediastinal or hilar lymphadenopathy. Small   hiatal hernia.    AIRWAYS/LUNGS/PLEURA: Degraded by respiratory motion artifact. There is   partial left upper lobe and complete lingular and left lower lobe   collapse with superimposed bronchial impaction. There is a small left   pleural effusion with intrafissural extension. There are patchy   groundglass opacities at the right apex, likely infectious/inflammatory.   No pneumothorax. Mild subpleural reticulation along the right middle lobe   may be secondary to right breast/chest wall radiotherapy.    HEART/VASCULATURE: Heart size is normal. No pericardial effusion. Mild   aortic valvular and mitral annular calcification. A left PICC is present   which has repositioned between the acquisition of the  imaging   (terminating in the SVC on ) and the chest CTA, now crossing midline   into the right innominate vein.    CHEST WALL AND LOWER NECK: The thyroid is incompletely characterized on   this study. Status post right mastectomy.    ABDOMEN AND PELVIS:    LIVER: Within normal limits. Calcified hepatic granuloma.    BILE DUCTS: No intrahepatic biliary ductal dilatation. Redemonstration of   choledocholithiasis in the ampullary region, unchanged since 12/30/2021.    GALLBLADDER: Cholelithiasis.    SPLEEN: Within normal limits.    PANCREAS: 1 cm pancreatic tail cyst.    ADRENALS: Within normal limits.    KIDNEYS/URETERS: Mildly atrophic left kidney. No hydronephrosis.    BLADDER: Within normal limits.    REPRODUCTIVE ORGANS: Uterus present. There is a 1.4 cm right adnexal cyst.    BOWEL: Percutaneous gastrostomy tube present with intraluminal balloon.   No bowel obstruction. Colonic diverticulosis without diverticulitis.   Interval removal of the right lower quadrant pigtail catheter in the   region of the appendix. There is a residual, multilocular 4.0 x 2.2 cm   collection with peripheral enhancement (series 3, image 84) in the region   of previously perforated appendix. Previously seen periappendiceal fat   stranding has largely resolved.    PERITONEUM: No ascites.    VESSELS: Aortic calcifications. Hypodense lesion noted at the origin of   the SMA which was previously seen but not as well appreciated on prior   noncontrast enhanced study on 12/30/2021. SMA however appears patent.    RETROPERITONEUM/LYMPH NODES: No lymphadenopathy.    ABDOMINAL WALL: . Fat-containing umbilical hernia.    BONES: Remote left proximal humeral fracture status post ORIF. Unchanged   T11 and L1 superior endplate compression deformities.    IMPRESSION:  Limited exam for pulmonary embolism; no central pulmonary embolism with   nondiagnostic evaluation of lobar, segmental and subsegmental branches.   No imaging evidence of right heart strain.    Complete lingular and left lower lobe collapse withsuperimposed   bronchial impaction. Small left pleural effusion. Mild patchy right   apical groundglass may be related to pulmonary edema, infection or   inflammation.    Multilocular 4.0 x 2.2 cm right lower quadrant fluid collection in the   regionof previously perforated appendix, status post drain removal.   Periappendiceal fat stranding has largely resolved.    Left PICC crosses midline and terminates in the right innominate vein,   likely repositioned during the administration of contrast between    imaging and CTA acquisition.    Cholelithiasis and choledocholithiasis. No intrahepatic biliary ductal   dilatation.    MARKO GUAMAN DO; Resident Radiologist  This document has been electronically signed.  SHARATH TURCIOS M.D., Attending Radiologist  This document has been electronically signed. Jan 20 2022 12:48PM  ---------------------------------------------------------------------------------------------------------------  EXAM:  CT BRAIN                          PROCEDURE DATE:  01/20/2022      FINDINGS:    Study is limited by motion.    No hydrocephalus, midline shift, or effacement of the basal cisterns. No   gross evidence for acute intracranial hemorrhage or brain edema.    Complete opacification of the right maxillary sinus and right mastoid air   cells.    IMPRESSION:    Limited by motion.    No gross evidence for acute intracranial hemorrhage or brain edema.    No hydrocephalus or midline shift.    Complete opacification of the right maxillary sinus and right mastoid air   cells. Correlate for the presence of sinusitis/mastoiditis.    LESLI KING MD; Attending Radiologist  This document has been electronically signed. Jan 20 2022 10:38AM  ---------------------------------------------------------------------------------------------------------------

## 2022-02-08 NOTE — PROGRESS NOTE ADULT - CONSTITUTIONAL DETAILS
on NC/no distress/obese
on supplemental O2/obese
obese
on NC/no distress/obese
obese
no distress/obese
no distress/obese
on NC/obese

## 2022-02-08 NOTE — PROGRESS NOTE ADULT - ASSESSMENT
85 y/o female, hx of CVA, MI, breast CA, COPD, not on home 02, s/p recent  perforated  appendox/ abscess,  HN, HLD, hypothyroid,  obexity, relatively  be d boind, , gout BIBEMS from [facility] due to SOB., started this morning patient initial 02 sat was 84% placed on NRB and improved to 93 with recent perforated appendicitis, possible IE on long term iv abx     John Fragoso  Attending Physician   Division of Infectious Disease  Pager #[phone]  Available on [employer] Teams also  After 5pm/weekend or no response, call #[phone]

## 2022-02-08 NOTE — PROGRESS NOTE ADULT - ASSESSMENT
86   year old female    h/o hemorrhagic CVA, has  PEG,  HTN, MI,   HLD, gout, right ca  breast   right Ca breast. s/p mastectomy in 2019,  s/p  sentinel node  localization.  4/4,  were  negative  peg dislodged.  IR   replacements  on 1/3/22   picc  and iv ab  for 6 weeks, per  ID, on  last  visit  s/p rrt   for hypoxia. cxr with complete  collapsed  of  left lung, needing broch, by  pulm [facility]   s/ p  bronchoscopy , pt  with  tracheomalacia  and  collapsed  airways,  makes  this a  difficult  situation, as there is no good rx for this          *  p/w   sob. acute  resp failure  on Bipap,  from left   lung  collapse  afberile,  with normal  wbc  on arrival  *   s/p cva, has  PEG,  npo  ,  on  synthroid, Keppra  ,  *  HTN, on meds  per  card  *  h/o ca breast. /, s/p  R  mastectomy  * obesity, bmi  was   41, now  is  34 in  er  left  leg contracture ,  remains  bed  bound. functional  paraplegias  needs  assistance  for  all her  adl's  *   h/o bacteremia. on 12/19/21,  Staph epi, meth R,  from perforated  appendicitis  ct  c/a/p, from last  visit   pna, perforated  appendicitis,  ?  mets  to  acetabulum, was  seen by dr pacheco   surg/ IR  and  oncology eval dr pacheco   sa w pt.  no intervention/  son awrae     and  also, with  prior ,  echo, vegetation on  aortic  valve/ endocarditis,   and  per  card, pt is  at  high risk  for  esdras    per card , pt high risk for esdras  and given that . this will not alter rx. pt  will need   extended  course  of ab     on iv  vanco and ertapenem.  till  2/9/.22  ID dr reagan, and per  surg  and  IR  eval,  no intervnetion  CT  1/20/22, no PE. collection in right side   on Proventil,  Mucomyst and   saline  nebs.   * pt  with  h/o  tracheomalacia  and  collapsed  airways,  makes  this a  difficult  situation, as there is no good rx for this  given pt;s  mlple co morbidities,  pt is  at risk for  re admissions  picc  line/  on nocturnal  awaps  difficult  situation, a s there  is no  good  rx  for  pt's  recurrent lung collapse hypoxia   on lopressor/  on  oxygen    has  been  cleared  by  pulm.  and  re  admission  likely,, given her  airways, and  propensity  for  lung collapse   d/c  to rehab,  with  nocturnal  awaps/  now  changed to  bipap  per pulm, to ease  transfer to  n home    still awiating  transfer   to  n home. ,  dependent  on oxygen/    awiating   d/c  for past  week/  labs  stable/  care  cortn following  pt/  team  awrae       per  son, pt is  full code     rad< from: CT Angio Chest PE Protocol w/ IV Cont (01.20.22 @ 10:35) >  IMPRESSION:  Limited exam for pulmonary embolism; no central pulmonary embolism with   nondiagnostic evaluation of lobar, segmental and subsegmental branches.   No imaging evidence of right heart strain.  Complete lingular and left lower lobe collapse withsuperimposed   bronchial impaction. Small left pleural effusion. Mild patchy right   apical groundglass may be related to pulmonary edema, infection or   inflammation.  Multilocular 4.0 x 2.2 cm right lower quadrant fluid collection in the   regionof previously perforated appendix, status post drain removal.   Periappendiceal fat stranding has largely resolved.  Left PICC crosses midline and terminates in the right innominate vein,   likely repositioned during the administration of contrast between    imaging and CTA acquisition.  Cholelithiasis and choledocholithiasis. No intrahepatic biliary ductal   dilatation.  --- End of Report ---  < end of copied text >

## 2022-02-08 NOTE — PROGRESS NOTE ADULT - PROBLEM SELECTOR PLAN 2
-s/p 6 weeks iv abx   -no fever  -on long term iv abx  -check surveillance blood cx post iv abx
-on above abx  -no fever  -on long term iv abx  -check surveillance blood cx post iv abx
-s/p 6 weeks iv abx   -no fever  -s/p long term iv abx  -surveillance blood cx post iv abx negative

## 2022-02-08 NOTE — PROGRESS NOTE ADULT - NSPROGADDITIONALINFOA_GEN_ALL_CORE
Will sign off, recall ID if needed #[phone].
I am away on 2/3 and will return on 2/4. ID coverage available. Call ID office @ [phone] with questions.
Please call the ID service [phone] with questions or concerns over the weekend.
Please call the ID service [phone] with questions or concerns over the weekend.
Please call the ID service [phone] with questions or concerns over the weekend.

## 2022-02-08 NOTE — PROGRESS NOTE ADULT - RS GEN HX ROS MEA POS PC
chronic/dyspnea/cough

## 2022-02-08 NOTE — PROGRESS NOTE ADULT - SUBJECTIVE AND OBJECTIVE BOX
afebrile  REVIEW OF SYSTEMS:  GEN: no fever,    no chills  RESP: no SOB,   no cough  CVS: no chest pain,   no palpitations  GI: no abdominal pain,   no nausea,   no vomiting,   no constipation,   no diarrhea  : no dysuria,   no frequency  NEURO: no headache,   no dizziness  PSYCH: no depression,   not anxious  Derm : no rash    MEDICATIONS  (STANDING):  albuterol/ipratropium for Nebulization 3 milliLiter(s) Nebulizer every 6 hours  allopurinol 100 milliGRAM(s) Oral daily  atorvastatin 20 milliGRAM(s) Oral at bedtime  chlorhexidine 2% Cloths 1 Application(s) Topical <User Schedule>  guaiFENesin Oral Liquid (Sugar-Free) 300 milliGRAM(s) Oral every 6 hours  heparin   Injectable 5000 Unit(s) SubCutaneous every 8 hours  levETIRAcetam  Solution 750 milliGRAM(s) Oral two times a day  levothyroxine 75 MICROGram(s) Oral daily  metoprolol tartrate 25 milliGRAM(s) Oral two times a day  nystatin Powder 1 Application(s) Topical two times a day  sodium chloride 3%  Inhalation 4 milliLiter(s) Inhalation every 6 hours    MEDICATIONS  (PRN):  acetaminophen    Suspension .. 650 milliGRAM(s) Oral every 6 hours PRN Temp greater or equal to 38C (100.4F), Mild Pain (1 - 3)      Vital Signs Last 24 Hrs  T(C): 36.3 (08 Feb 2022 04:56), Max: 36.7 (07 Feb 2022 21:30)  T(F): 97.3 (08 Feb 2022 04:56), Max: 98 (07 Feb 2022 21:30)  HR: 90 (08 Feb 2022 06:04) (70 - 98)  BP: 106/69 (08 Feb 2022 05:43) (101/61 - 132/79)  BP(mean): --  RR: 19 (08 Feb 2022 04:56) (19 - 20)  SpO2: 96% (08 Feb 2022 06:04) (90% - 96%)  CAPILLARY BLOOD GLUCOSE        I&O's Summary    07 Feb 2022 07:01  -  08 Feb 2022 07:00  --------------------------------------------------------  IN: 0 mL / OUT: 600 mL / NET: -600 mL        PHYSICAL EXAM:  HEAD:  Atraumatic, Normocephalic  NECK: Supple, No   JVD  CHEST/LUNG:   no     rales,     no,    rhonchi  HEART: Regular rate and rhythm;         murmur  ABDOMEN: Soft, Nontender, ;   EXTREMITIES:   no     edema  NEUROLOGY:  alert    LABS:                        9.0    6.77  )-----------( 215      ( 06 Feb 2022 09:59 )             29.5     02-06    138  |  101  |  25<H>  ----------------------------<  124<H>  4.6   |  23  |  0.62    Ca    9.7      06 Feb 2022 09:59                      Thyroid Stimulating Hormone, Serum: 2.39 uIU/mL (01-07 @ 09:13)          Consultant(s) Notes Reviewed:      Care Discussed with Consultants/Other Providers:

## 2022-02-08 NOTE — PROGRESS NOTE ADULT - PROBLEM SELECTOR PLAN 1
-cont vancomycin 750 mg iv q24   -cont invanz 1 gm iv q24  -abd exam stable
-continue vancomycin 1 gm iv q24 + invanz 1 gm iv q24  -abd exam stable
-lower vancomycin 750 mg iv q24   -cont invanz 1 gm iv q24  -abd exam stable
-lower vancomycin 750 mg iv q24   -cont invanz 1 gm iv q24  -abd exam stable
-s/p vancomycin/invanz  -abd exam stable
-s/p vancomycin/invanz  -abd exam stable

## 2022-02-08 NOTE — PROGRESS NOTE ADULT - PROBLEM SELECTOR PROBLEM 3
Long term current use of antibiotics

## 2022-02-08 NOTE — PROGRESS NOTE ADULT - PROBLEM SELECTOR PLAN 3
-cont abx  -day 35 of 42 of abx for IE/perforated appendicitis.
-cont abx  -day 34 of 42 of abx for IE/perforated appendicitis.
-cont abx  -day 36 of 42 of abx for IE/perforated appendicitis.
-cont abx  -day 40 of 42 of abx for IE/perforated appendicitis.
-s/p 42 days of abx for IE/perforated appendicitis.
-cont abx  -day 41 of 42 of abx for IE/perforated appendicitis.
-cont abx  -day 37 of 42 of abx for IE/perforated appendicitis.
-s/p 42 days of abx for IE/perforated appendicitis  -DC picc on DC from hospital

## 2022-02-08 NOTE — PROGRESS NOTE ADULT - GASTROINTESTINAL DETAILS
soft/nontender/no distention/no rebound tenderness/no guarding
soft/nontender/no distention/no guarding/no rigidity
soft/nontender/no distention/no guarding/no rigidity
soft/nontender/no rebound tenderness/no guarding
soft/nontender/no rebound tenderness/no guarding
soft/nontender/no distention/no guarding/no rigidity
soft/nontender/no distention/no guarding/no rigidity
soft/nontender/no rebound tenderness/no guarding/no rigidity

## 2022-02-08 NOTE — PROGRESS NOTE ADULT - NEGATIVE GASTROINTESTINAL SYMPTOMS
no vomiting/no diarrhea/no abdominal pain

## 2022-02-08 NOTE — PROGRESS NOTE ADULT - ASSESSMENT
ASSESSMENT:    86 year old gentlewoman, lifelong non-smoker, without history of intrinsic lung disease. 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 She also has a history of HTN, HLD, CAD s/p MI with preserved LVEF and moderate aortic stenosis. The patient was admitted to [facility] on December 19th 2021 with fever and abdominal pain. She was found to have perforated appendicitis with abscess formation. She was treated with tube drainage and antibiotics for a polymicrobial infection. She continues on vancomycin/ertapenem via a PICC line. Admission CT had debris within the left lobar and more distal airways of the left lower lobe as well as some debris within the right lower lobe airway - there was complete atelectasis of the left lower lobe and subsegmental atelectasis of the left upper lobe and dependent portions of the right lung. The patient developed increased work of breathing, tachycardia, tachypnea and hypoxemia on January 4th due to mucous plugging with complete collapse of the left lung. She underwent bronchoscopy on January 6th with removal of copious amounts of purulent secretions from throughout the bronchial tree - there was severe tracheobronchomalacia. Bronchial cultures were negative. She was treated with bronchodilators, mucolytic nebs, chest vest and nocturnal AVAPS with expansion of the left upper lobe and some reexpansion of the left lower lobe. She was discharged to rehab on January 11th on a 3lpm nasal canula with plans to continue medical and mechanical therapy as well as nocturnal BIPAP. The patient returns from rehab with decreased mental status, dyspnea and hypoxemia treated with 100% NRB. She currently is obtunded on a BIPAP device. CT scan -> partial left upper lobe and complete lingular and left lower lobe collapse with superimposed bronchial impaction - small left pleural effusion with intrafissural extension - patchy groundglass opacities at the right apex. The patient has redeveloped left lung atelectasis due to mucous plugging -> multifactorial. There is an area of inflammation/infection in the right upper lobe  1) tracheobronchomalacia with marked expiratory airway narrowing preventing adequate sputum clearance  2) weak cough following a CVA with marked deconditioning   3) dysphagia with ongoing aspiration of oral secretions  4) moderate aortic stenosis with a small left pleural effusion    2/1 - much more awake and alert; no shortness of breath or hypoxemia on a nasal canula @ 4lpm; wore AVAPS overnight; cough with difficulty expectorating sputum; no hemoptysis, chest congestion or wheeze; no fevers, chills or sweats; no chest pain/pressure or palpitations; bedbound;     2/4 - much more awake and alert; no shortness of breath or hypoxemia on a nasal canula @ 4 - 6lpm; wore AVAPS overnight; cough with difficulty expectorating sputum; no hemoptysis, chest congestion or wheeze; no fevers, chills or sweats; no chest pain/pressure or palpitations; bedbound; states that she tolerated a dysphagia diet at [facility]    2/7 - awake and alert; no shortness of breath or hypoxemia on a nasal canula @ 4 lpm; wore BIPAP overnight; no cough, sputum production, hemoptysis, chest congestion or wheeze; no fevers, chills or sweats; no chest pain/pressure or palpitations; bedbound; states that she tolerates a dysphagia diet at [facility]      PLAN/RECOMMENDATIONS:    stable oxygenation a 4 lpm nasal canula while awake  nocturnal BIPAP to facilitate lung expansion  VBG is without evidence of hypercapnia  albuterol/atrovent nebs q6h  hypertonic saline nebs to facilitate mucous clearance  guaifenesin 300mg 4 times daily via PEG  discontinue chest vest/manual chest PT   CXR 2/4 is with persistent left lower lung field opacification c/w atelectasis  chest CT 1/31 -> mild progression of left lower lobe collapse, lingular collapse and partial left upper lobe collapse  s/p bronchoscopy 1/6 with removal of copious amounts of purulent mucous from throughout the airways - there was severe tracheobronchomalacia -> cultures are negative  holding off with repeat bronchoscopy - she would unlikely be able to be "liberated" from a ventilator if intubated - she would unlikely benefit long term from a repeat bronchoscopy  has completed a prolonged course of vancomycin/ertapenem following perforated appendicitis with possible  infective endocarditis  cardiac meds: lopressor/lipitor  DVT prophylaxis - SQ heparin  allopurinol/keppra/synthroid  abdominal/pelvic CT - percutaneous gastrostomy tube present with intraluminal balloon - colonic diverticulosis without diverticulitis - interval removal of the right lower quadrant pigtail catheter in the region of the appendix - there is a residual, multilocular 4.0 x 2.2 cm collection with peripheral enhancement in the region of the previously perforated appendix - periappendiceal fat stranding has largely resolved - ID/IR/surgery evaluations noted - no intervention is needed    I am not hopeful that the left lung will expand due to the multiple issues delineated above - suspect that she will need to be treated with oxygen supplementation to keep saturation greater than 90% accepting the fact that parts of her left lung will be non-functional    Will follow with you. Plan of care discussed with the patient at bedside, the dedicated floor NP, the patient's son Luis by phone and with Dr. Segundo. No pulmonary objection to discharge to [facility].    Kennedy Marcano MD, [facility]  [phone]  Pulmonary Medicine

## 2022-02-08 NOTE — PROGRESS NOTE ADULT - PROBLEM SELECTOR PLAN 4
-stable  -pulm following   -mucus plugging on CT

## 2022-02-08 NOTE — PROGRESS NOTE ADULT - MS EXT PE MLT D E PC
no cyanosis/pedal edema
no cyanosis
no cyanosis/pedal edema
no cyanosis
no cyanosis/pedal edema
no cyanosis
no cyanosis/pedal edema
no cyanosis

## 2022-02-08 NOTE — PROGRESS NOTE ADULT - RS GEN PE MLT RESP DETAILS PC
clear to auscultation bilaterally/no wheezes
coarse BS+
diminished breath sounds, L/diminished breath sounds, R
no wheezes
no wheezes/diminished breath sounds, L
no wheezes/diminished breath sounds, L
diminished breath sounds, L
no wheezes/diminished breath sounds, L

## 2022-02-08 NOTE — PROGRESS NOTE ADULT - NEUROLOGICAL DETAILS
responds to verbal commands
responds to verbal commands/disoriented
responds to verbal commands

## 2022-02-09 NOTE — DISCHARGE NOTE NURSING/CASE MANAGEMENT/SOCIAL WORK - NSDCPEFALRISK_GEN_ALL_CORE
For information on Fall & Injury Prevention, visit: [email or web address] OR  [email or web address] OR  https://www.cdc.gov/steadi/patient.html

## 2022-02-09 NOTE — DISCHARGE NOTE PROVIDER - NSDCMRMEDTOKEN_GEN_ALL_CORE_FT
acetaminophen 160 mg/5 mL oral suspension: 20.31 milliliter(s) orally every 6 hours, As needed, Temp greater or equal to 38C (100.4F), Mild Pain (1 - 3)  allopurinol 100 mg oral tablet: 1 tab(s) orally once a day  atorvastatin 20 mg oral tablet: 1 tab(s) orally once a day (at bedtime)  guaiFENesin 100 mg/5 mL oral liquid: 15 milliliter(s) orally every 6 hours  heparin: 5000 unit(s) subcutaneous every 8 hours  ipratropium-albuterol 0.5 mg-2.5 mg/3 mL inhalation solution: 3 milliliter(s) inhaled every 6 hours  levETIRAcetam 100 mg/mL oral solution: 7.5 milliliter(s) orally 2 times a day  levothyroxine 75 mcg (0.075 mg) oral tablet: 1 tab(s) orally once a day  metoprolol tartrate 25 mg oral tablet: 1 tab(s) orally 2 times a day  nystatin 100,000 units/g topical powder: 1 application topically 2 times a day  sodium chloride 3% inhalation solution: 4 milliliter(s) inhaled every 6 hours  Vitamin B12 1000 mcg oral tablet: 1 tab(s) orally once a day

## 2022-02-09 NOTE — DISCHARGE NOTE PROVIDER - PROVIDER TOKENS
FREE:[LAST:[Medicine Doctor],FIRST:[Primary],PHONE:[(   )    -],FAX:[(   )    -],FOLLOWUP:[1 week]],PROVIDER:[TOKEN:[[id number]]]

## 2022-02-09 NOTE — PROGRESS NOTE ADULT - ASSESSMENT
86   year old female    h/o hemorrhagic CVA, has  PEG,  HTN, MI,   HLD, gout, right ca  breast   right Ca breast. s/p mastectomy in 2019,  s/p  sentinel node  localization.  4/4,  were  negative  peg dislodged.  IR   replacements  on 1/3/22   picc  and iv ab  for 6 weeks, per  ID, on  last  visit  s/p rrt   for hypoxia. cxr with complete  collapsed  of  left lung, needing broch, by  pulm magnus mir   s/ p  bronchoscopy , pt  with  tracheomalacia  and  collapsed  airways,  makes  this a  difficult  situation, as there is no good rx for this          *  p/w   sob. acute  resp failure  on Bipap,  from left   lung  collapse  afberile,  with normal  wbc  on arrival  *   s/p cva, has  PEG,  npo  ,  on  synthroid, Keppra  ,  *  HTN, on meds  per  card  *  h/o ca breast. /, s/p  R  mastectomy  * obesity, bmi  was   41, now  is  34 in  er  left  leg contracture ,  remains  bed  bound. functional  paraplegias  needs  assistance  for  all her  adl's  *   h/o bacteremia. on 12/19/21,  Staph epi, meth R,  from perforated  appendicitis  ct  c/a/p, from last  visit   pna, perforated  appendicitis,  ?  mets  to  acetabulum, was  seen by dr pacheco   surg/ IR  and  oncology eval dr pacheco   sa w pt.  no intervention/  son awrae     and  also, with  prior ,  echo, vegetation on  aortic  valve/ endocarditis,   and  per  card, pt is  at  high risk  for  esdras    per card , pt high risk for esdras  and given that . this will not alter rx. pt  will need   extended  course  of ab     on iv  vanco and ertapenem.  till  2/9/.22  ID dr reagan, and per  surg  and  IR  eval,  no intervnetion  CT  1/20/22, no PE. collection in right side   on Proventil,  Mucomyst and   saline  nebs.   * pt  with  h/o  tracheomalacia  and  collapsed  airways,  makes  this a  difficult  situation, as there is no good rx for this  given pt;s  mlple co morbidities,  pt is  at risk for  re admissions  picc  line/  on nocturnal  awaps  difficult  situation, a s there  is no  good  rx  for  pt's  recurrent lung collapse hypoxia   on lopressor/  on  oxygen    has  been  cleared  by  pulm.  and  re  admission  likely,, given her  airways, and  propensity  for  lung collapse   d/c  to rehab,  with  nocturnal  awaps/  now  changed to  bipap  per pulm, to ease  transfer to  n home   awiating   d/c  for past  week/  labs  stable/  care  cortn following  pt/  team  aware   pt  is  oxygen dependent       per  son, pt is  full code     rad< from: CT Angio Chest PE Protocol w/ IV Cont (01.20.22 @ 10:35) >  IMPRESSION:  Limited exam for pulmonary embolism; no central pulmonary embolism with   nondiagnostic evaluation of lobar, segmental and subsegmental branches.   No imaging evidence of right heart strain.  Complete lingular and left lower lobe collapse withsuperimposed   bronchial impaction. Small left pleural effusion. Mild patchy right   apical groundglass may be related to pulmonary edema, infection or   inflammation.  Multilocular 4.0 x 2.2 cm right lower quadrant fluid collection in the   regionof previously perforated appendix, status post drain removal.   Periappendiceal fat stranding has largely resolved.  Left PICC crosses midline and terminates in the right innominate vein,   likely repositioned during the administration of contrast between    imaging and CTA acquisition.  Cholelithiasis and choledocholithiasis. No intrahepatic biliary ductal   dilatation.  --- End of Report ---  < end of copied text >

## 2022-02-09 NOTE — PROGRESS NOTE ADULT - ASSESSMENT
ASSESSMENT:    86 year old gentlewoman, lifelong non-smoker, without history of intrinsic lung disease. 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 She also has a history of HTN, HLD, CAD s/p MI with preserved LVEF and moderate aortic stenosis. The patient was admitted to [facility] on December 19th 2021 with fever and abdominal pain. She was found to have perforated appendicitis with abscess formation. She was treated with tube drainage and antibiotics for a polymicrobial infection. She continues on vancomycin/ertapenem via a PICC line. Admission CT had debris within the left lobar and more distal airways of the left lower lobe as well as some debris within the right lower lobe airway - there was complete atelectasis of the left lower lobe and subsegmental atelectasis of the left upper lobe and dependent portions of the right lung. The patient developed increased work of breathing, tachycardia, tachypnea and hypoxemia on January 4th due to mucous plugging with complete collapse of the left lung. She underwent bronchoscopy on January 6th with removal of copious amounts of purulent secretions from throughout the bronchial tree - there was severe tracheobronchomalacia. Bronchial cultures were negative. She was treated with bronchodilators, mucolytic nebs, chest vest and nocturnal AVAPS with expansion of the left upper lobe and some reexpansion of the left lower lobe. She was discharged to rehab on January 11th on a 3lpm nasal canula with plans to continue medical and mechanical therapy as well as nocturnal BIPAP. The patient returns from rehab with decreased mental status, dyspnea and hypoxemia treated with 100% NRB. She currently is obtunded on a BIPAP device. CT scan -> partial left upper lobe and complete lingular and left lower lobe collapse with superimposed bronchial impaction - small left pleural effusion with intrafissural extension - patchy groundglass opacities at the right apex. The patient has redeveloped left lung atelectasis due to mucous plugging -> multifactorial. There is an area of inflammation/infection in the right upper lobe  1) tracheobronchomalacia with marked expiratory airway narrowing preventing adequate sputum clearance  2) weak cough following a CVA with marked deconditioning   3) dysphagia with ongoing aspiration of oral secretions  4) moderate aortic stenosis with a small left pleural effusion    2/1 - much more awake and alert; no shortness of breath or hypoxemia on a nasal canula @ 4lpm; wore AVAPS overnight; cough with difficulty expectorating sputum; no hemoptysis, chest congestion or wheeze; no fevers, chills or sweats; no chest pain/pressure or palpitations; bedbound;     2/4 - much more awake and alert; no shortness of breath or hypoxemia on a nasal canula @ 4 - 6lpm; wore AVAPS overnight; cough with difficulty expectorating sputum; no hemoptysis, chest congestion or wheeze; no fevers, chills or sweats; no chest pain/pressure or palpitations; bedbound; states that she tolerated a dysphagia diet at [facility]    2/7 - awake and alert; no shortness of breath or hypoxemia on a nasal canula @ 4 lpm; wore BIPAP overnight; no cough, sputum production, hemoptysis, chest congestion or wheeze; no fevers, chills or sweats; no chest pain/pressure or palpitations; bedbound; states that she tolerates a dysphagia diet at [facility]      PLAN/RECOMMENDATIONS:    stable oxygenation a 4 lpm nasal canula while awake  nocturnal BIPAP to facilitate lung expansion  VBG is without evidence of hypercapnia  albuterol/atrovent nebs q6h  hypertonic saline nebs to facilitate mucous clearance  guaifenesin 300mg 4 times daily via PEG  discontinue chest vest/manual chest PT   CXR 2/4 is with persistent left lower lung field opacification c/w atelectasis  chest CT 1/31 -> mild progression of left lower lobe collapse, lingular collapse and partial left upper lobe collapse  s/p bronchoscopy 1/6 with removal of copious amounts of purulent mucous from throughout the airways - there was severe tracheobronchomalacia -> cultures are negative  holding off with repeat bronchoscopy - she would unlikely be able to be "liberated" from a ventilator if intubated - she would unlikely benefit long term from a repeat bronchoscopy  has completed a prolonged course of vancomycin/ertapenem following perforated appendicitis with possible  infective endocarditis  cardiac meds: lopressor/lipitor  DVT prophylaxis - SQ heparin  allopurinol/keppra/synthroid  abdominal/pelvic CT - percutaneous gastrostomy tube present with intraluminal balloon - colonic diverticulosis without diverticulitis - interval removal of the right lower quadrant pigtail catheter in the region of the appendix - there is a residual, multilocular 4.0 x 2.2 cm collection with peripheral enhancement in the region of the previously perforated appendix - periappendiceal fat stranding has largely resolved - ID/IR/surgery evaluations noted - no intervention is needed    I am not hopeful that the left lung will expand due to the multiple issues delineated above - suspect that she will need to be treated with oxygen supplementation to keep saturation greater than 90% accepting the fact that parts of her left lung will be non-functional    Will follow with you. Plan of care discussed with the patient at bedside, the dedicated floor NP, the patient's son Luis by phone and with Dr. Oliveros (director of [facility]). No pulmonary objection to discharge to [facility].    Kennedy Marcano MD, [facility]  [phone]  Pulmonary Medicine

## 2022-02-09 NOTE — CHART NOTE - NSCHARTNOTESELECT_GEN_ALL_CORE
IR/Event Note
PICC/Event Note
Event Note
GOC/Event Note
IR
PICC line removal/Event Note
Progress note/Event Note

## 2022-02-09 NOTE — DISCHARGE NOTE NURSING/CASE MANAGEMENT/SOCIAL WORK - PATIENT PORTAL LINK FT
You can access the FollowMyHealth Patient Portal offered by [facility] by registering at the following website: [email or web address]. By joining [facility]’s FollowMyHealth portal, you will also be able to view your health information using other applications (apps) compatible with our system.

## 2022-02-09 NOTE — DISCHARGE NOTE PROVIDER - NSDCCPCAREPLAN_GEN_ALL_CORE_FT
PRINCIPAL DISCHARGE DIAGNOSIS  Diagnosis: Acute respiratory failure with hypoxia  Assessment and Plan of Treatment: Continue with Supplemental oxygen (4L during day on discharge) and noctural bipap.      SECONDARY DISCHARGE DIAGNOSES  Diagnosis: Bacteremia  Assessment and Plan of Treatment: Completed IV antibiotics on 2/3/22.  Surveillance blood cultures sent on 2/7/22 and negative to date.    Diagnosis: SOB (shortness of breath)  Assessment and Plan of Treatment: Multifactorial.  Continue with respiratory treatments as recommended by pulmonary.    Diagnosis: Perforated appendicitis  Assessment and Plan of Treatment: Completed IV antibiotics on 2/3/22.  Denies abdominal pain.     PRINCIPAL DISCHARGE DIAGNOSIS  Diagnosis: Acute respiratory failure with hypoxia  Assessment and Plan of Treatment: Continue with Supplemental oxygen (4L during day on discharge) and noctural bipap.      SECONDARY DISCHARGE DIAGNOSES  Diagnosis: Bacteremia  Assessment and Plan of Treatment: Completed IV antibiotics on 2/3/22.  Surveillance blood cultures sent on 2/7/22 and negative to date.    Diagnosis: SOB (shortness of breath)  Assessment and Plan of Treatment: Multifactorial.  Continue with respiratory treatments as recommended by pulmonary.    Diagnosis: Perforated appendicitis  Assessment and Plan of Treatment: Completed IV antibiotics on 2/3/22.  Denies abdominal pain.    Diagnosis: Hypertension  Assessment and Plan of Treatment: Continue with BP meds as prescribed.    Diagnosis: Hypothyroid  Assessment and Plan of Treatment: Continue with synthroid as prescribed.

## 2022-02-09 NOTE — DISCHARGE NOTE PROVIDER - CARE PROVIDER_API CALL
Medicine Doctor, Primary  Phone: (   )    -  Fax: (   )    -  Follow Up Time: 1 week    Kennedy Marcano)  Internal Medicine; Pulmonary Disease  [address] [facility], [address] [place] [address]  Phone: [phone]  Fax: [phone]  Follow Up Time:

## 2022-02-09 NOTE — PROGRESS NOTE ADULT - SUBJECTIVE AND OBJECTIVE BOX
CARDIOLOGY     PROGRESS  NOTE   ________________________________________________    CHIEF COMPLAINT:Patient is a 86y old  Female who presents with a chief complaint of sob (09 Feb 2022 07:43)  doing well.  	  REVIEW OF SYSTEMS:  CONSTITUTIONAL: No fever, weight loss, or fatigue  EYES: No eye pain, visual disturbances, or discharge  ENT:  No difficulty hearing, tinnitus, vertigo; No sinus or throat pain  NECK: No pain or stiffness  RESPIRATORY: No cough, wheezing, chills or hemoptysis; + mild Shortness of Breath  CARDIOVASCULAR: No chest pain, palpitations, passing out, dizziness, or leg swelling  GASTROINTESTINAL: No abdominal or epigastric pain. No nausea, vomiting, or hematemesis; No diarrhea or constipation. No melena or hematochezia.  GENITOURINARY: No dysuria, frequency, hematuria, or incontinence  NEUROLOGICAL: No headaches, memory loss, loss of strength, numbness, or tremors  SKIN: No itching, burning, rashes, or lesions   LYMPH Nodes: No enlarged glands  ENDOCRINE: No heat or cold intolerance; No hair loss  MUSCULOSKELETAL: No joint pain or swelling; No muscle, back, or extremity pain  PSYCHIATRIC: No depression, anxiety, mood swings, or difficulty sleeping  HEME/LYMPH: No easy bruising, or bleeding gums  ALLERGY AND IMMUNOLOGIC: No hives or eczema	    [ ] All others negative	  [ ] Unable to obtain    PHYSICAL EXAM:  T(C): 36.3 (02-09-22 @ 05:11), Max: 37 (02-09-22 @ 00:35)  HR: 80 (02-09-22 @ 05:47) (65 - 86)  BP: 124/71 (02-09-22 @ 05:11) (94/55 - 124/71)  RR: 18 (02-09-22 @ 05:11) (18 - 18)  SpO2: 98% (02-09-22 @ 05:47) (94% - 98%)  Wt(kg): --  I&O's Summary    08 Feb 2022 07:01  -  09 Feb 2022 07:00  --------------------------------------------------------  IN: 880 mL / OUT: 700 mL / NET: 180 mL        Appearance: Normal	  HEENT:   Normal oral mucosa, PERRL, EOMI	  Lymphatic: No lymphadenopathy  Cardiovascular: Normal S1 S2, No JVD, + murmurs, No edema  Respiratory: Lungs clear to auscultation	  Psychiatry: A & O x 3, Mood & affect appropriate  Gastrointestinal:  Soft, Non-tender, + BS	  Skin: No rashes, No ecchymoses, No cyanosis	  Neurologic: Non-focal  Extremities: Normal range of motion, No clubbing, cyanosis or edema  Vascular: Peripheral pulses palpable 2+ bilaterally    MEDICATIONS  (STANDING):  albuterol/ipratropium for Nebulization 3 milliLiter(s) Nebulizer every 6 hours  allopurinol 100 milliGRAM(s) Oral daily  atorvastatin 20 milliGRAM(s) Oral at bedtime  chlorhexidine 2% Cloths 1 Application(s) Topical <User Schedule>  guaiFENesin Oral Liquid (Sugar-Free) 300 milliGRAM(s) Oral every 6 hours  heparin   Injectable 5000 Unit(s) SubCutaneous every 8 hours  levETIRAcetam  Solution 750 milliGRAM(s) Oral two times a day  levothyroxine 75 MICROGram(s) Oral daily  metoprolol tartrate 25 milliGRAM(s) Oral two times a day  nystatin Powder 1 Application(s) Topical two times a day  sodium chloride 3%  Inhalation 4 milliLiter(s) Inhalation every 6 hours      TELEMETRY: 	    ECG:  	  RADIOLOGY:  OTHER: 	  	  LABS:	 	    CARDIAC MARKERS:                  proBNP: Serum Pro-Brain Natriuretic Peptide: 148 pg/mL (01-20 @ 08:53)    Lipid Profile:   HgA1c:   TSH:     Vancomycin Level, Trough (02.04.22 @ 07:21)    Vancomycin Level, Trough: 17.4: Vancomycin trough levels should be rapidly reached and maintained at  15-20 ug/ml for life threatening MRSA  infections such as sepsis, endocarditis, osteomyelitis and pneumonia. A  first trough level should be drawn  before the 3rd or 4th dose.  Risk of renal toxicity is increased for levels >15 ug/ml, in patients on  other nephrotoxic drugs, who are  hemodynamically unstable, have unstable renal function, or are on  Vancomycin therapy for >14 days. Renal function with  creatinine levels should be monitored for those patients. ug/mL    ·  Plan: -s/p 6 weeks iv abx   -no fever  -s/p long term iv abx  -surveillance blood cx post iv abx negative.    Assessment and plan  ---------------------------  85 y/o female, hx of CVA, MI, breast CA, COPD, not on home 02, s/p  recent  perforated  appendix / abscess,  HN, HLD, hypothyroid,  obesity relatively  bed bound , gout   BIBEMS from [facility] due to SOB., started this morning   patient initial 02 sat was 84% placed on NRB and improved to 93%.  has a pic line to left arm ,  and  peg tube  pt had a long course of admission sec to mucus plug and collapsed lung, s/p bronchoscopy.  ct angio of the chest noted, sig abnormality with collapse of the lung  small pleural effusion, doubt chf  agree with iv abx  dvt prophylaxis  pulmonary noted  ecg noted with old inferior wall mi, no acute changes  continue current bp meds  echo noted  remained in NSR   sob sec to underlying lung disease  dvt prophylaxis  pulmonary noted  repeat blood work  ct chest noted , ID/ pulmonary comments  pt with intermittent runs of pat , re started on beta blocker  oob to chair as tolerated  hypoxemia, pulmonar fu, last recommendations noted  dc planning

## 2022-02-09 NOTE — PROGRESS NOTE ADULT - REASON FOR ADMISSION
sob

## 2022-02-09 NOTE — DISCHARGE NOTE PROVIDER - HOSPITAL COURSE
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 HTN, MI, HLD, gout, right ca  breasts/p mastectomy in 2019,  s/p sentinel node  localization.  4/4,  were  negative.  Lifelong non-smoker, without history of intrinsic lung disease.   Admitted to [facility] on December 19th 2021 with fever and abdominal pain. She was found to have perforated appendicitis with abscess formation. She was treated with tube drainage and antibiotics for a polymicrobial infection. She continues on vancomycin/ertapenem via a PICC line.  Admission CT had debris within the left lobar and more distal airways of the left lower lobe as well as some debris within the right lower lobe airway - there was complete atelectasis of the left lower lobe and subsegmental atelectasis of the left upper lobe and dependent portions of the right lung. The patient developed increased work of breathing, tachycardia, tachypnea and hypoxemia on January 4th due to mucous plugging with complete collapse of the left lung. She underwent bronchoscopy on January 6th with removal of copious amounts of purulent secretions from throughout the bronchial tree - there was severe tracheobronchomalacia. Bronchial cultures were negative. She was treated with bronchodilators, mucolytic nebs, chest vest and nocturnal AVAPS with expansion of the left upper lobe and some reexpansion of the left lower lobe. She was discharged to rehab on January 11th on a 3lpm nasal canula with plans to continue medical and mechanical therapy as well as nocturnal BIPAP. The patient returns from rehab with decreased mental status, dyspnea and hypoxemia treated with 100% NRB.  CT scan -> partial left upper lobe and complete lingular and left lower lobe collapse with superimposed bronchial impaction - small left pleural effusion with intrafissural extension - patchy groundglass opacities at the right apex. The patient has redeveloped left lung atelectasis due to mucous plugging -> multifactorial. There is an area of inflammation/infection in the right upper lobe.  CTA negative for PE.  1) tracheobronchomalacia with marked expiratory airway narrowing preventing adequate sputum clearance  2) weak cough following a CVA with marked deconditioning   3) dysphagia with ongoing aspiration of oral secretions  4) moderate aortic stenosis with a small left pleural effusion    Will continue with iv  vanco and ertapenem until 2/3/22.  As per ID dr reagan, and per  surg  and  IR  greta,  no intervnetion  On Proventil,  Mucomyst and   saline  nebs.  Given mlple co morbidities,  pt is  at risk for  re admissions  To continue with nocturnal  BIPAP. Doing well on 4L NC during the day.   d/c  to rehab with supplemental O2 and bipap.

## 2022-02-09 NOTE — PROGRESS NOTE ADULT - SUBJECTIVE AND OBJECTIVE BOX
afberile    REVIEW OF SYSTEMS:  GEN: no fever,    no chills  RESP: no SOB,   no cough  CVS: no chest pain,   no palpitations  GI: no abdominal pain,   no nausea,   no vomiting,   no constipation,   no diarrhea  : no dysuria,   no frequency  NEURO: no headache,   no dizziness  PSYCH: no depression,   not anxious  Derm : no rash    MEDICATIONS  (STANDING):  albuterol/ipratropium for Nebulization 3 milliLiter(s) Nebulizer every 6 hours  allopurinol 100 milliGRAM(s) Oral daily  atorvastatin 20 milliGRAM(s) Oral at bedtime  chlorhexidine 2% Cloths 1 Application(s) Topical <User Schedule>  guaiFENesin Oral Liquid (Sugar-Free) 300 milliGRAM(s) Oral every 6 hours  heparin   Injectable 5000 Unit(s) SubCutaneous every 8 hours  levETIRAcetam  Solution 750 milliGRAM(s) Oral two times a day  levothyroxine 75 MICROGram(s) Oral daily  metoprolol tartrate 25 milliGRAM(s) Oral two times a day  nystatin Powder 1 Application(s) Topical two times a day  sodium chloride 3%  Inhalation 4 milliLiter(s) Inhalation every 6 hours    MEDICATIONS  (PRN):  acetaminophen    Suspension .. 650 milliGRAM(s) Oral every 6 hours PRN Temp greater or equal to 38C (100.4F), Mild Pain (1 - 3)      Vital Signs Last 24 Hrs  T(C): 36.3 (09 Feb 2022 05:11), Max: 37 (09 Feb 2022 00:35)  T(F): 97.4 (09 Feb 2022 05:11), Max: 98.6 (09 Feb 2022 00:35)  HR: 80 (09 Feb 2022 05:47) (65 - 86)  BP: 124/71 (09 Feb 2022 05:11) (94/55 - 124/71)  BP(mean): --  RR: 18 (09 Feb 2022 05:11) (18 - 18)  SpO2: 98% (09 Feb 2022 05:47) (94% - 98%)  CAPILLARY BLOOD GLUCOSE        I&O's Summary    08 Feb 2022 07:01  -  09 Feb 2022 07:00  --------------------------------------------------------  IN: 880 mL / OUT: 700 mL / NET: 180 mL        PHYSICAL EXAM:  HEAD:  Atraumatic, Normocephalic  NECK: Supple, No   JVD  CHEST/LUNG:   no     rales,     no,    rhonchi  HEART: Regular rate and rhythm;         murmur  ABDOMEN: Soft, Nontender, ;   EXTREMITIES:   no     edema  NEUROLOGY:  alert    LABS:                          Thyroid Stimulating Hormone, Serum: 2.39 uIU/mL (01-07 @ 09:13)          Consultant(s) Notes Reviewed:      Care Discussed with Consultants/Other Providers:

## 2022-02-09 NOTE — PROGRESS NOTE ADULT - SUBJECTIVE AND OBJECTIVE BOX
[facility] - PROGRESS NOTE    CHIEF COMPLAINT: acute hypoxic respiratory failure; mucous plugging; left lung atelectasis; weak cough; dysphagia; tracheobronchomalacia; PEG;     INTERVAL HISTORY: still not discharged - I discussed the patient with Dr. Oliveros (director of [facility]) who as accepted the patient; off isolation following exposure to a COVID (+) staff member; has completed a 6 week course of antibiotics for perforated appendicitis +/- infective endocarditis; awake and alert; no shortness of breath or hypoxemia on a nasal canula @ 4lpm; wore BIPAP overnight; no cough, sputum production, hemoptysis, chest congestion or wheeze; no fevers, chills or sweats; no chest pain/pressure or palpitations; bedbound; CT scan 1/31 -> mild progression of left lower lobe collapse, lingular collapse and partial left upper lobe collapse; eager to go back to [facility] where she usually eats and participates with rehab;    REVIEW OF SYSTEMS:  Constitutional: As per interval history  HEENT: Within normal limits  CV: As per interval history  Resp: As per interval history  GI: dysphagia -> PEG  : Within normal limits  Musculoskeletal: Within normal limits  Skin: Within normal limits  Neurological: CVA - > left sided weakness/plegia  Psychiatric: Within normal limits  Endocrine: Within normal limits  Hematologic/Lymphatic: Within normal limits  Allergic/Immunologic: Within normal limits    MEDICATIONS:     Pulmonary "  albuterol/ipratropium for Nebulization 3 milliLiter(s) Nebulizer every 6 hours  guaiFENesin Oral Liquid (Sugar-Free) 300 milliGRAM(s) Oral every 6 hours  sodium chloride 3%  Inhalation 4 milliLiter(s) Inhalation every 6 hours    Anti-microbials:    Cardiovascular:  metoprolol tartrate 25 milliGRAM(s) Oral two times a day    Other:  allopurinol 100 milliGRAM(s) Oral daily  atorvastatin 20 milliGRAM(s) Oral at bedtime  chlorhexidine 2% Cloths 1 Application(s) Topical <User Schedule>  heparin   Injectable 5000 Unit(s) SubCutaneous every 8 hours  levETIRAcetam  Solution 750 milliGRAM(s) Oral two times a day  levothyroxine 75 MICROGram(s) Oral daily  nystatin Powder 1 Application(s) Topical two times a day    MEDICATIONS  (PRN):  acetaminophen    Suspension .. 650 milliGRAM(s) Oral every 6 hours PRN Temp greater or equal to 38C (100.4F), Mild Pain (1 - 3)    OBJECTIVE:    PHYSICAL EXAM:       ICU Vital Signs Last 24 Hrs  T(C): 36.3 (09 Feb 2022 05:11), Max: 37 (09 Feb 2022 00:35)  T(F): 97.4 (09 Feb 2022 05:11), Max: 98.6 (09 Feb 2022 00:35)  HR: 80 (09 Feb 2022 05:47) (65 - 86)  BP: 124/71 (09 Feb 2022 05:11) (94/55 - 124/71)  BP(mean): --  ABP: --  ABP(mean): --  RR: 18 (09 Feb 2022 05:11) (18 - 18)  SpO2: 98% (09 Feb 2022 05:47) (94% - 98%) on 4lpm nasal canula     General: Awake and alert. Cooperative. No distress Appears stated age. In bed.  HEENT: Atraumatic. Normocephalic. Anicteric. Normal oral mucosa. PERRL. EOMI. Mallampati class IV airway.  Neck: Supple. Trachea midline. Thyroid without enlargement/tenderness/nodules. No carotid bruit. No JVD. Short and wide  Cardiovascular: Regular rate and rhythm. S1 S2 normal. III/VI systolic murmur  Respiratory: Respirations unlabored. Decreased breath sounds left hemithorax. No wheeze. No curvature.  Abdomen: Soft. Non-tender. Non-distended. No organomegaly. No masses. Normal bowel sounds. Obese. PEG.  Extremities: Warm to touch. No clubbing or cyanosis. No pedal edema. LUE PICC line.  Pulses: 2+ peripheral pulses all extremities.	  Skin: Normal skin color. No rashes or lesions. No ecchymoses. No cyanosis. Warm to touch.  Lymph Nodes: Cervical, supraclavicular and axillary nodes normal  Neurological: Left lower extremity plegia with contraction. Left upper extremity is quite weak. A and O x 3  Psychiatry: Calm. Excited about her son's 50[age] birthday.    LABS:      CBC    WBC  6.77 <==    Hemoglobin  9.0 <<==    Hematocrit  29.5 <==    Platelets  215 <==      138  |  101  |  25<H>  ----------------------------<  124<H>    02-06  4.6   |  23  |  0.62      LYTES    sodium  138 <==    potassium   4.6 <==    chloride  101 <==    carbon dioxide  23 <==    =============================================================================================  RENAL FUNCTION:    Creatinine:   0.62  <<==    BUN:   25 <==    ============================================================================================    calcium   9.7 <==    ============================================================================================  Blood Gas Profile - Venous (02.04.22 @ 08:12)   pH, Venous: 7.35:  pCO2, Venous: 49 mmHg   pO2, Venous: 40 mmHg   HCO3, Venous: 27 mmol/L   Base Excess, Venous: 1.1 mmol/L   Oxygen Saturation, Venous: 63.5 %   Total CO2, Venous: 29 mmol/L   Blood Gas Source Venous: Venous   ---------------------------------------------------------------------------------------------------------------  Venous Blood Gas:  01-20 @ 08:53  7.34/55/52/30/83.0  VBG Lactate: 1.7    Serum Pro-Brain Natriuretic Peptide: 148 pg/mL (01-20 @ 08:53)    CARDIAC MARKERS ( 20 Jan 2022 08:53 )  CPK x     /CKMB x     /CKMB Units x        troponin 21 ng/L    < from: Transthoracic Echocardiogram (12.21.21 @ 14:39) >    Patient name: FRANKI MEHTA  YOB: 1935   Age: 86 (F)   MR#: [id number]  Study Date: 12/21/2021  Location: [address] [place], [place] [id number]: Tena Garnett RDCS  Study quality: Technically difficult/Limited  Referring Physician: Edmond Almazan MD  Blood Pressure: 134/77 mmHg  Height: 163 cm  Weight: 109 kg  BSA: 2.1 m2  Heart Rate: 105 mmHg  ------------------------------------------------------------------------  Dimensions:    Normal Values:  LA:     3.6    2.0 - 4.0 cm  Ao:     2.7    2.0 - 3.8 cm  SEPTUM: 1.1    0.6 - 1.2 cm  PWT:    1.0    0.6 - 1.1 cm  LVIDd:  3.7    3.0 - 5.6 cm  LVIDs:  2.0    1.8 - 4.0 cm  Derived variables:  LVMI: 60 g/m2  RWT: 0.58  Fractional short: 46 %  EF (Visual Estimate): 70 %  Doppler Peak Velocity (m/sec): AoV=2.5  ------------------------------------------------------------------------  Conclusions:  Normal left ventricular systolic function. No segmental  wall motion abnormalities.  Moderate aortic stenosis.  There may be a vegetation on the left or non-coronary cusp  of the aortic valve. Consider transesophageal  echocardiography.  ------------------------------------------------------------------------  Confirmed on  12/22/2021 - 10:32:40 by Rahul Correa M.D.  ------------------------------------------------------------------------    MICROBIOLOGY:     Respiratory Viral Panel with COVID-19 by RYAN (01.20.22 @ 08:52)   Rapid RVP Result: [facility]   SARS-CoV-2: [facility]    Urinalysis Basic - ( 20 Jan 2022 14:56 )    Color: Yellow / Appearance: Slightly Turbid / SG: >1.050 / pH: x  Gluc: x / Ketone: Negative  / Bili: Negative / Urobili: Negative   Blood: x / Protein: 100 / Nitrite: Negative   Leuk Esterase: Large / RBC: 7 /hpf / WBC 25 /HPF   Sq Epi: x / Non Sq Epi: 5 /hpf / Bacteria: Few    Culture - Urine (01.20.22 @ 16:37)   Culture Results:   >100,000 CFU/ml Enterococcus faecium (vancomycin resistant)   <10,000 CFU/ml Normal Urogenital ck present     Culture - Bronchial (01.06.22 @ 18:48)   Culture Results:   Normal Respiratory Ck present     Culture - Abscess with Gram Stain (12.23.21 @ 18:49)   Specimen Source: .Abscess abdominal abscess   Culture Results:   Few Escherichia coli   Few Morganella morganii   Moderate Enterococcus avium   Numerous Bacteroides thetaiotamcron group "Susceptibilities not performed"   Numerous Clostridium ramosum "Susceptibilities not performed"     Culture - Blood (12.19.21 @ 14:24)   Culture Results:   Growth in aerobic bottle: Staphylococcus epidermidis   Organism Identification: Staphylococcus epidermidis     Culture - Blood (12.19.21 @ 14:24)   Gram Stain:   Growth in aerobic bottle: Gram Positive Cocci in Clusters   Growth in anaerobic bottle: Gram Positive Cocci in Clusters   - Staphylococcus epidermidis, Methicillin resistant: Detec     RADIOLOGY:  [x] Chest radiographs reviewed and interpreted by me    EXAM:  XR CHEST PORTABLE URGENT 1V                          PROCEDURE DATE:  02/04/2022      Frontal expiratory view of the chest shows the heart to be similar in   size. Left PICC reaches the SVC. Gastric tube and left tracheal curve are   again noted.    The lungs show clear right lung with similar left infiltrate/effusion and   there is no evidence of pneumothorax nor right pleural effusion.    IMPRESSION:  Similar left infiltrate/effusion.    Thank you for the courtesy of this referral.    STACY CRUZ MD; Attending Interventional Radiologist  This document has been electronicallysigned. Feb 4 2022  2:24PM  ---------------------------------------------------------------------------------------------------------------  EXAM:  CT CHEST                          PROCEDURE DATE:  01/31/2022      FINDINGS:    LYMPH NODES: No mediastinal lymphadenopathy.    HEART/VASCULATURE: Heart size is normal. No pericardial effusion. Aortic   and mitral valve calcification. Partially visualized left PICC line   catheter with the tip in the SVC.    AIRWAYS/LUNGS/PLEURA: Complete collapse of the left lower and lingular   lobes with partial collapse of the left upper lobe with bronchial   impaction, progressed compared to prior. There is leftward mediastinal   shift.  Mosaic attenuation of the left upper lobe.  Patchy groundglass opacities at the right apex, similar to prior.  Dependent right lung subsegmental atelectasis.  Stable trace left pleural effusion with intrafissural extension.    UPPER ABDOMEN: Calcified hepatic granuloma.    BONES/SOFT TISSUES: Status post right mastectomy. Status post left   humerus ORIF.    IMPRESSION:    Persistent left lower lobe collapse, lingular collapse and partial left   upper lobe collapse when compared with previous exam. There is mild   progression since the previous study.      Stable patchy groundglass opacities of the right lung apex, suggestive of   infection versus inflammation.    Remaining incidental findings as above.    SHERLEY WATSON MD; Resident Radiologist  This document has been electronically signed.  FAUSTINO PIZANO MD; Attending Radiologist  This document has been electronically signed. Jan 31 2022  4:36PM  ---------------------------------------------------------------------------------------------------------------   EXAM:  XR CHEST PORTABLE URGENT 1V                          PROCEDURE DATE:  01/28/2022      INTERPRETATION:  EXAMINATION: XR CHEST URGENT    CLINICAL INDICATION: Mucus plugging, left lung atelectasis.    TECHNIQUE: Single frontal, portable view of the chest was obtained.    COMPARISON: Chest radiograph 1/27/2022.    FINDINGS:    Left PICC tip within SVC. Status post ORIF of the left humerus.  Cannot accurately assess cardiac size in this projection.  Left lower lung consolidation and volume loss is unchanged.  Left pleural effusion. No pneumothorax.    IMPRESSION:  Left lower lung consolidation and volume loss is unchanged.    MOUSTAPHA COURTNEY MD; Resident Radiology  This document has been electronically signed.  GIANNI MANTILLA MD; Attending Radiologist  This document has been electronically signed. Jan 28 2022  6:32PM  ---------------------------------------------------------------------------------------------------------------  EXAM:  CT ABDOMEN AND PELVIS IC                        [facility]                          PROCEDURE DATE:  01/20/2022      FINDINGS:    CHEST:    PULMONARY ANGIOGRAM: Limited study secondary to extensive respiratory   motion artifact. No evidence of large central pulmonary embolism, with   limited evaluation of lobar, segmental and subsegmental branches. The   pulmonary artery is enlarged measuring 3.3 cm, nonspecific although can   be seen with pulmonary hypertension.    MEDIASTINUM/LYMPH NODES: No mediastinal or hilar lymphadenopathy. Small   hiatal hernia.    AIRWAYS/LUNGS/PLEURA: Degraded by respiratory motion artifact. There is   partial left upper lobe and complete lingular and left lower lobe   collapse with superimposed bronchial impaction. There is a small left   pleural effusion with intrafissural extension. There are patchy   groundglass opacities at the right apex, likely infectious/inflammatory.   No pneumothorax. Mild subpleural reticulation along the right middle lobe   may be secondary to right breast/chest wall radiotherapy.    HEART/VASCULATURE: Heart size is normal. No pericardial effusion. Mild   aortic valvular and mitral annular calcification. A left PICC is present   which has repositioned between the acquisition of the  imaging   (terminating in the SVC on ) and the chest CTA, now crossing midline   into the right innominate vein.    CHEST WALL AND LOWER NECK: The thyroid is incompletely characterized on   this study. Status post right mastectomy.    ABDOMEN AND PELVIS:    LIVER: Within normal limits. Calcified hepatic granuloma.    BILE DUCTS: No intrahepatic biliary ductal dilatation. Redemonstration of   choledocholithiasis in the ampullary region, unchanged since 12/30/2021.    GALLBLADDER: Cholelithiasis.    SPLEEN: Within normal limits.    PANCREAS: 1 cm pancreatic tail cyst.    ADRENALS: Within normal limits.    KIDNEYS/URETERS: Mildly atrophic left kidney. No hydronephrosis.    BLADDER: Within normal limits.    REPRODUCTIVE ORGANS: Uterus present. There is a 1.4 cm right adnexal cyst.    BOWEL: Percutaneous gastrostomy tube present with intraluminal balloon.   No bowel obstruction. Colonic diverticulosis without diverticulitis.   Interval removal of the right lower quadrant pigtail catheter in the   region of the appendix. There is a residual, multilocular 4.0 x 2.2 cm   collection with peripheral enhancement (series 3, image 84) in the region   of previously perforated appendix. Previously seen periappendiceal fat   stranding has largely resolved.    PERITONEUM: No ascites.    VESSELS: Aortic calcifications. Hypodense lesion noted at the origin of   the SMA which was previously seen but not as well appreciated on prior   noncontrast enhanced study on 12/30/2021. SMA however appears patent.    RETROPERITONEUM/LYMPH NODES: No lymphadenopathy.    ABDOMINAL WALL: . Fat-containing umbilical hernia.    BONES: Remote left proximal humeral fracture status post ORIF. Unchanged   T11 and L1 superior endplate compression deformities.    IMPRESSION:  Limited exam for pulmonary embolism; no central pulmonary embolism with   nondiagnostic evaluation of lobar, segmental and subsegmental branches.   No imaging evidence of right heart strain.    Complete lingular and left lower lobe collapse withsuperimposed   bronchial impaction. Small left pleural effusion. Mild patchy right   apical groundglass may be related to pulmonary edema, infection or   inflammation.    Multilocular 4.0 x 2.2 cm right lower quadrant fluid collection in the   regionof previously perforated appendix, status post drain removal.   Periappendiceal fat stranding has largely resolved.    Left PICC crosses midline and terminates in the right innominate vein,   likely repositioned during the administration of contrast between    imaging and CTA acquisition.    Cholelithiasis and choledocholithiasis. No intrahepatic biliary ductal   dilatation.    MARKO GUAMAN DO; Resident Radiologist  This document has been electronically signed.  SHARATH TURCIOS M.D., Attending Radiologist  This document has been electronically signed. Jan 20 2022 12:48PM  ---------------------------------------------------------------------------------------------------------------  EXAM:  CT BRAIN                          PROCEDURE DATE:  01/20/2022      FINDINGS:    Study is limited by motion.    No hydrocephalus, midline shift, or effacement of the basal cisterns. No   gross evidence for acute intracranial hemorrhage or brain edema.    Complete opacification of the right maxillary sinus and right mastoid air   cells.    IMPRESSION:    Limited by motion.    No gross evidence for acute intracranial hemorrhage or brain edema.    No hydrocephalus or midline shift.    Complete opacification of the right maxillary sinus and right mastoid air   cells. Correlate for the presence of sinusitis/mastoiditis.    LESLI KING MD; Attending Radiologist  This document has been electronically signed. Jan 20 2022 10:38AM  ---------------------------------------------------------------------------------------------------------------

## 2022-03-16 NOTE — ED PROVIDER NOTE - PROGRESS NOTE DETAILS
Malorie Spain MD, PGY-2: pt stable, abs ordered for possible intraabdominal infection, signout given to hospitalist.

## 2022-03-16 NOTE — ED PROVIDER NOTE - CLINICAL SUMMARY MEDICAL DECISION MAKING FREE TEXT BOX
Francisco Asher):  86 F PMHx of CVA with residual L sided weakness/plegia, and dysphagia requiring placement of a PEG, CAD s/p MI with preserved LVEF and moderate AS, HTN, gout, right breast CA s/p mastectomy in 2019 presents with bloody diarrhea. No recent abx use to explain diarrhea. Pt is hemodynamically stable. Will obtain labs to r/o anemia vs infectious cause. Low concern for GI bleed. Will obtain CT to r/o diverticulitis. Will reassess

## 2022-03-16 NOTE — ED PROVIDER NOTE - PHYSICAL EXAMINATION
Francisco Asher (MD):    GEN: NAD, awake, eyes open spontaneously  EYES: normal conjunctiva, perrl  ENT: NCAT, MMM, Trachea midline  CHEST/LUNGS: Non-tachypneic, CTAB, bilateral breath sounds  CARDIAC: Non-tachycardic, normal perfusion  ABDOMEN: Mild lower quadrant tenderness, NRNG, perfuse bright red diarrhea, grossly bloody. No hemorrhoids  MSK: No edema, no gross deformity of extremities  SKIN: No rashes, no petechiae, no vesicles  NEURO: Chronic L sided weakness   PSYCH: Alert, appropriate, cooperative, with capacity and insight

## 2022-03-16 NOTE — ED ADULT NURSE REASSESSMENT NOTE - NS ED NURSE REASSESS COMMENT FT1
md bartholomew to place ultrasound guided Iv on right extremity due to difficulty in obtaining access for CT scan. as per md gonzalez to place on right extremity and access line

## 2022-03-16 NOTE — ED ADULT NURSE NOTE - NSIMPLEMENTINTERV_GEN_ALL_ED
Implemented All Fall with Harm Risk Interventions:  [place] to call system. Call bell, personal items and telephone within reach. Instruct patient to call for assistance. Room bathroom lighting operational. Non-slip footwear when patient is off stretcher. Physically safe environment: no spills, clutter or unnecessary equipment. Stretcher in lowest position, wheels locked, appropriate side rails in place. Provide visual cue, wrist band, yellow gown, etc. Monitor gait and stability. Monitor for mental status changes and reorient to person, place, and time. Review medications for side effects contributing to fall risk. Reinforce activity limits and safety measures with patient and family. Provide visual clues: red socks.

## 2022-03-16 NOTE — ED ADULT NURSE NOTE - OBJECTIVE STATEMENT
87 y/o female brought in by EMS,A&OX3, complaining of rectal bleed. as per ems, pt had one episode of bloody diarrhea at rehab center. pmh of stroke andbreast ca, respiratory failure. ems denies AC use, only prophylactic heparin subcutaneous BID. pt left sided contracted at baseline. pt denies chest pain, shortness of breath, n/v, urinary symptoms, abdominal pain, fevers, chills, cough. pt able to speak in full complete sentences. as per ems, pt placed on 3L nasal canula reason unknown. pt breathing spontaneous and unlabored. abd soft nontender. peg tube in place. extremity equal in strength and sensation. capillary refill <2seconds. sacrum with blanchable redness. pt placed on cardiac monitor showing NSR in 90s. Safety and comfort measures provided, bed locked and in lowest position, side rails up for safety. Call bell within reach. Awaiting md hernandes

## 2022-03-16 NOTE — ED ADULT NURSE REASSESSMENT NOTE - NS ED NURSE REASSESS COMMENT FT1
Patient straight cathed using sterile technique. Second RN at bedside to confirm sterility. Patient tolerated procedure well. Output of approximately 150 cc's of yellow urine. Sterile specimens collected and sent to lab. Pt tolerated well, successful after one attempt. pt placed on primafit

## 2022-03-16 NOTE — ED PROVIDER NOTE - OBJECTIVE STATEMENT
86 F with PMHx of CVA with residual L sided weakness/plegia, and dysphagia requiring placement of a PEG, CAD s/p MI with preserved LVEF and moderate AS, HTN, gout, right breast CA s/p mastectomy in 2019 presents to the ED c/o BRB per rectum x3 days associated with nausea, and mild abdominal pain. Denies vomiting, fever, or recent abx use.

## 2022-03-17 NOTE — CONSULT NOTE ADULT - ASSESSMENT
ASSESSMENT:    86 year old gentlewoman, lifelong non-smoker, without history of intrinsic lung disease. 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 She also has a history of HTN, HLD, CAD s/p MI with preserved LVEF and moderate aortic stenosis. The patient was admitted to [place] in December 2021 with fever and abdominal pain. She was found to have perforated appendicitis with abscess formation. She was treated with tube drainage and antibiotics for a polymicrobial infection. Admission CT had debris within the left lobar and more distal airways of the left lower lobe as well as some debris within the right lower lobe airway - there was complete atelectasis of the left lower lobe and subsegmental atelectatic changes within the left upper lobe and dependent portions of the right lung. Hospital course was complicated by respiratory failure due to mucous plugging with complete collapse of the left lung. She underwent bronchoscopy with removal of copious amounts of purulent secretions from throughout the bronchial tree - there was severe tracheobronchomalacia. She was treated with bronchodilators, mucolytic nebs, chest vest and nocturnal AVAPS with expansion of the left upper lobe and some reexpansion of the left lower lobe. She was discharged to rehab on a 3lpm nasal canula with plans to continue medical and mechanical therapy as well as nocturnal BIPAP. She has been doing "well" at rehab and reports that she has been out of bed and into the chair. It does not appear that she has been wearing nocturnal NIV. The patient is sent to the ER from rehab due to bright red blood per rectum with diarrhea over the last few days. The patient is hemodynamically stable without significant anemia. Her respiratory status is stable. The left lower lobe appears well aerated with bibasilar subsegmental atelectasis on the abdominal/pelvic CT scan. Suspect bleeding is related to diverticulosis or hemorrhoids.    CT abdomen/pelvis - scattered sigmoid diverticulosis without evidence of diverticulitis - interval decrease in size of a fluid collection in the region of the previously perforated appendicitis measuring 2.8 x 1.5 cm previously measuring 4.0 x 2.2 cm - slight increase in mild adjacent inflammatory change     PLAN/RECOMMENDATIONS:    stable oxygenation on a nasal canula @ 3lpm  nocturnal BIPAP ordered  VBG is without evidence of hypercapnia  observe off antibiotics and systemic steroids  albuterol/atrovent nebs q6h  hypertonic saline nebs to facilitate mucous clearance  guaifenesin 300mg 4 times daily via PEG  cardiology evaluation noted  usual cardiac meds: lopressor/lipitor  DVT prophylaxis - SQ heparin  allopurinol/keppra/synthroid  DVT prophylaxis - SQ heparin  GI evaluation    Thank you for the courtesy of this referral. Plan of care discussed with the patient and her RN at bedside     Kennedy Marcano MD, [facility]  [phone]  Pulmonary Medicine

## 2022-03-17 NOTE — H&P ADULT - NSHPPHYSICALEXAM_GEN_ALL_CORE
PHYSICAL EXAMINATION:  Vital Signs Last 24 Hrs  T(C): 36.6 (17 Mar 2022 08:07), Max: 37.2 (16 Mar 2022 19:38)  T(F): 97.8 (17 Mar 2022 08:07), Max: 98.9 (16 Mar 2022 19:38)  HR: 93 (17 Mar 2022 08:07) (86 - 94)  BP: 99/67 (17 Mar 2022 08:07) (97/54 - 131/58)  BP(mean): 69 (17 Mar 2022 00:11) (66 - 98)  RR: 18 (17 Mar 2022 08:07) (18 - 22)  SpO2: 100% (17 Mar 2022 08:07) (94% - 100%)  CAPILLARY BLOOD GLUCOSE      POCT Blood Glucose.: 140 mg/dL (17 Mar 2022 06:28)        GENERAL: NAD, well-groomed,  HEAD:  atraumatic, normocephalic  EYES: sclera anicteric  ENMT: mucous membranes moist  NECK: supple, No JVD  CHEST/LUNG: clear to auscultation bilaterally;    no      rales   ,   no rhonchi,   HEART: normal S1, S2  ABDOMEN: BS+, soft, ND, NT   EXTREMITIES:    no    edema    b/l LEs  NEURO: awake, ,     moves all extremities  SKIN: no     rash

## 2022-03-17 NOTE — CONSULT NOTE ADULT - SUBJECTIVE AND OBJECTIVE BOX
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 Patient currently denies any abd pain, n/v, constipation and diarrhea. She has not seen blood in her stool because she hasn't looked but was told by nursing staff that there was.       Allergies:  Toradol (Urticaria (Mild to Mod); Rash (Mild to Mod))      Home Medications:    Hospital Medications:  acetaminophen     Tablet .. 650 milliGRAM(s) Oral once  acetylcysteine 20%  Inhalation 4 milliLiter(s) Inhalation three times a day  albuterol/ipratropium for Nebulization 3 milliLiter(s) Nebulizer every 6 hours  allopurinol 100 milliGRAM(s) Oral daily  atorvastatin 20 milliGRAM(s) Oral at bedtime  cefTRIAXone   IVPB 1000 milliGRAM(s) IV Intermittent every 24 hours  cyanocobalamin 1000 MICROGram(s) Oral daily  guaiFENesin Oral Liquid (Sugar-Free) 300 milliGRAM(s) Oral every 6 hours  heparin   Injectable 5000 Unit(s) SubCutaneous every 12 hours  levETIRAcetam  Solution 500 milliGRAM(s) Oral two times a day  levothyroxine 75 MICROGram(s) Oral daily  sodium chloride 3%  Inhalation 4 milliLiter(s) Inhalation every 12 hours      PMHX/PSHX:  MI (myocardial infarction)    HTN (hypertension)    Personal history of asbestosis    Gout    Dyslipidemia    UTI (lower urinary tract infection)    ETOH abuse    CVA (cerebral vascular accident)    History of left shoulder fracture    History of benign breast tumor        Family history:  No pertinent family history in first degree relatives      PHYSICAL EXAM:     GENERAL: Obese, NAD   HEENT:  NC/AT,  conjunctivae clear and pink, no thyromegaly, nodules, adenopathy, no JVD, sclera -anicteric  CHEST:  Full & symmetric excursion, no increased effort, rales b/l in lower lobes   HEART:  Regular rhythm, S1, S2, no murmur/rub/S3/S4, no abdominal bruit, no edema  ABDOMEN:  Soft, non-tender, non-distended, normoactive bowel sounds,  no masses ,no hepato-splenomegaly, no signs of chronic liver disease  EXTEREMITIES:  no cyanosis,clubbing. 1+ edema   Rectal: no hemmhoroids appreciated, normal tan colored soft stool felt and visualized, no blood or melena   SKIN:  No rash/erythema/ecchymoses/petechiae/wounds/abscess/warm/dry  NEURO:  Alert, oriented, no asterixis    Vital Signs:  Vital Signs Last 24 Hrs  T(C): 36.6 (17 Mar 2022 08:07), Max: 37.2 (16 Mar 2022 19:38)  T(F): 97.8 (17 Mar 2022 08:07), Max: 98.9 (16 Mar 2022 19:38)  HR: 93 (17 Mar 2022 08:07) (86 - 94)  BP: 99/67 (17 Mar 2022 08:07) (97/54 - 131/58)  BP(mean): 69 (17 Mar 2022 00:11) (66 - 98)  RR: 18 (17 Mar 2022 08:07) (18 - 22)  SpO2: 100% (17 Mar 2022 08:07) (94% - 100%)  Daily Height in cm: 162.56 (16 Mar 2022 19:05)    Daily     LABS:                        9.7    6.44  )-----------( 222      ( 16 Mar 2022 19:57 )             31.9     Mean Cell Volume: 105.3 fl (- @ 19:57)    03-    137  |  98  |  26<H>  ----------------------------<  121<H>  4.9   |  24  |  0.70    Ca    10.3      16 Mar 2022 19:57    TPro  7.0  /  Alb  3.5  /  TBili  0.2  /  DBili  x   /  AST  22  /  ALT  15  /  AlkPhos  100      LIVER FUNCTIONS - ( 16 Mar 2022 19:57 )  Alb: 3.5 g/dL / Pro: 7.0 g/dL / ALK PHOS: 100 U/L / ALT: 15 U/L / AST: 22 U/L / GGT: x             Urinalysis Basic - ( 16 Mar 2022 20:31 )    Color: Light Yellow / Appearance: Clear / S.020 / pH: x  Gluc: x / Ketone: Negative  / Bili: Negative / Urobili: Negative   Blood: x / Protein: Negative / Nitrite: Negative   Leuk Esterase: Negative / RBC: 5 /hpf / WBC 1 /HPF   Sq Epi: x / Non Sq Epi: 5 /hpf / Bacteria: Negative        Lipase serum50                             9.7    6.44  )-----------( 222      ( 16 Mar 2022 19:57 )             31.9     Imaging:    < from: CT Abdomen and Pelvis w/ IV Cont (22 @ 22:41) >  FINDINGS:  LOWER CHEST: Bibasilar subsegmental atelectasis. Coronary artery   calcification.    LIVER: Within normal limits.  BILE DUCTS: Normal caliber.  GALLBLADDER: Cholelithiasis.  SPLEEN: Within normal limits.  PANCREAS: Withinnormal limits.  ADRENALS: Within normal limits.  KIDNEYS/URETERS: No hydronephrosis. Bilateral renal atrophy.    BLADDER: Within normal limits.  REPRODUCTIVE ORGANS: Ovaries within normal limits. Atrophic uterus.    BOWEL: Gastrostomy tube with retention balloon in the body of the   stomach. No bowel obstruction. Scattered sigmoid diverticulosis without   evidence of diverticulitis. Interval decrease in size of a fluid   collection in the region of the previously perforated appendicitis   measuring 2.8 x 1.5 cm previously measuring 4.0 x 2.2 cm. Slight increase   in mild adjacent inflammatory change.  PERITONEUM: No ascites. No pneumoperitoneum.  VESSELS: Within normal limits.  RETROPERITONEUM/LYMPH NODES: No lymphadenopathy.  ABDOMINAL WALL:Subcutaneous injection granulomas. Marked diffuse muscle   atrophy. Diastases of the abdominis rectus muscles. Small fat-containing   umbilical hernia.  BONES: Degenerative changes of the spine. T11 and L1 vertebral body   compression fracture deformities, not significantly changed.    IMPRESSION:  Interval decrease in size of a fluid collection in the region of the   previously perforated appendicitis with slight increase in mild adjacent   inflammatory change for which a superimposed acute infection cannot be   excluded.    < end of copied text >

## 2022-03-17 NOTE — CONSULT NOTE ADULT - ASSESSMENT
86 with PMHx of CVA with residual L sided weakness/plegia, and dysphagia / PEG, CAD s/p MI with preserved LVEF and moderate AS, HTN, gout, right breast CA s/p mastectomy in 2019   presents to the ED c/o BRB per rectum x3 days associated with nausea, and mild abdominal pain.  Denies vomiting, fever, or recent abx use.  pt is well known to me with hx of htn, tracheomalacia with multiple admission with l lung collapse, htn, cad, chf diastolic dysfunction with perforated appendicitis.  ct scan noted  continue iv abx  surgery eval noted  dvt prophylaxis  hold diuretics

## 2022-03-17 NOTE — H&P ADULT - ASSESSMENT
86   year old female    h/o hemorrhagic CVA, has  PEG,  HTN, MI,   HLD, gout, right ca  breast   right Ca breast. s/p mastectomy in 2019,  s/p  sentinel node  localization.  4/4,  were  negative  peg dislodged.  IR   replacements  on 1/3/22  s/p rrt   for hypoxia. cxr with complete  collapsed  of  left lung, needing broch,   s/ p  bronchoscopy , pt  with  tracheomalacia  and  collapsed  airways,  makes  this a  difficult  situation, as there is no good rx for this     h/o bacteremia. on 12/19/21,  Staph epi, meth R,  from perforated  appendicitis  ct  c/a/p, from last  visit   pna, perforated  appendicitis,  ?  mets  to  acetabulum, was  seen by dr pacheco   surg/ IR  and  oncology eval dr pacheco   sa w pt.  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 this will not alter rx. pt  will need   extended  course  of ab   on iv  vanco and ertapenem.  till  2/9/.22  ID dr reagan, and per  surg  and  IR  eval,  no intervention   CT  1/20/22, no PE. collection in right side           admitted  with rectal bleed.  *   s/p cva, has  PEG,  npo  ,  on  synthroid, Keppra  ,  *  HTN, on meds  per  card  *  h/o ca breast. /, s/p  R  mastectomy  * obesity, bmi  was   41, now  is  34 in  er  left  leg contracture ,  remains  bed  bound. functional  paraplegias  needs  assistance  for  all her  adl's  * on Proventil,  Mucomyst and   saline  nebs.   * pt  with  h/o  tracheomalacia  and  collapsed  airways,  given pt;s  mlple co morbidities,  pt is  at risk for  re admissions   on nocturnal  awaps  difficult  situation, a s there  is no  good  rx  for  pt's  recurrent lung collapse hypoxia  low  sbp/  lopressor  stopped/  gi eval/  on protonix      re  admission  likely,, given her  airways, and  propensity  for  lung collapse   pt  is  oxygen dependent  per  son, pt is  full code     rad< from: CT Angio Chest PE Protocol w/ IV Cont (01.20.22 @ 10:35) >  IMPRESSION:  Limited exam for pulmonary embolism; no central pulmonary embolism with   nondiagnostic evaluation of lobar, segmental and subsegmental branches.   No imaging evidence of right heart strain.  Complete lingular and left lower lobe collapse withsuperimposed   bronchial impaction. Small left pleural effusion. Mild patchy right   apical groundglass may be related to pulmonary edema, infection or   inflammation.  Multilocular 4.0 x 2.2 cm right lower quadrant fluid collection in the   regionof previously perforated appendix, status post drain removal.   Periappendiceal fat stranding has largely resolved.  Left PICC crosses midline and terminates in the right innominate vein,   likely repositioned during the administration of contrast between    imaging and CTA acquisition.  Cholelithiasis and choledocholithiasis. No intrahepatic biliary ductal   dilatation.  --- End of Report ---  < end of copied text >

## 2022-03-17 NOTE — PATIENT PROFILE ADULT - FALL HARM RISK - HARM RISK INTERVENTIONS
operating room Assistance with ambulation/Assistance OOB with selected safe patient handling equipment/Communicate Risk of Fall with Harm to all staff/Discuss with provider need for PT consult/Monitor gait and stability/Provide patient with walking aids - walker, cane, crutches/Reinforce activity limits and safety measures with patient and family/Tailored Fall Risk Interventions/Visual Cue: Yellow wristband and red socks/Bed in lowest position, wheels locked, appropriate side rails in place/Call bell, personal items and telephone in reach/Instruct patient to call for assistance before getting out of bed or chair/Non-slip footwear when patient is out of bed/[place] to call system/Physically safe environment - no spills, clutter or unnecessary equipment/Purposeful Proactive Rounding/Room/bathroom lighting operational, light cord in reach

## 2022-03-17 NOTE — H&P ADULT - NSHPLABSRESULTS_GEN_ALL_CORE
LABS:                        9.7    6.44  )-----------( 222      ( 16 Mar 2022 19:57 )             31.9         137  |  98  |  26<H>  ----------------------------<  121<H>  4.9   |  24  |  0.70    Ca    10.3      16 Mar 2022 19:57    TPro  7.0  /  Alb  3.5  /  TBili  0.2  /  DBili  x   /  AST  22  /  ALT  15  /  AlkPhos  100            Urinalysis Basic - ( 16 Mar 2022 20:31 )    Color: Light Yellow / Appearance: Clear / S.020 / pH: x  Gluc: x / Ketone: Negative  / Bili: Negative / Urobili: Negative   Blood: x / Protein: Negative / Nitrite: Negative   Leuk Esterase: Negative / RBC: 5 /hpf / WBC 1 /HPF   Sq Epi: x / Non Sq Epi: 5 /hpf / Bacteria: Negative           @ 19:57  4.7  54      Thyroid Stimulating Hormone, Serum: 2.39 uIU/mL ( @ 09:13)

## 2022-03-17 NOTE — CONSULT NOTE ADULT - SUBJECTIVE AND OBJECTIVE BOX
[facility] - [facility] - CONSULT NOTE    HPI: 86 year old gentlewoman, lifelong non-smoker, without history of intrinsic lung disease. 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 She also has a history of HTN, HLD, CAD s/p MI with preserved LVEF and moderate aortic stenosis. The patient was admitted to [facility] on 2021 with fever and abdominal pain. She was found to have perforated appendicitis with abscess formation. She was treated with tube drainage and antibiotics for a polymicrobial infection. Admission CT had debris within the left lobar and more distal airways of the left lower lobe as well as some debris within the right lower lobe airway - there was complete atelectasis of the left lower lobe and subsegmental atelectatic changes within the left upper lobe and dependent portions of the right lung. Hospital course was complicated by respiratory failure due to mucous plugging with complete collapse of the left lung. She underwent bronchoscopy with removal of copious amounts of purulent secretions from throughout the bronchial tree - there was severe tracheobronchomalacia. She was treated with bronchodilators, mucolytic nebs, chest vest and nocturnal AVAPS with expansion of the left upper lobe and some reexpansion of the left lower lobe. She was discharged to rehab on a 3lpm nasal canula with plans to continue medical and mechanical therapy as well as nocturnal BIPAP. The patient is sent to the ER from rehab due to bright red blood per rectum over the last few days. The patient is hemodynamically stable without significant anemia    PMHX:  HTN (hypertension)  HLD (dyslipidemia)  CAD (coronary artery disease)  MI (myocardial infarction)  Aortic stenosis  CVA (cerebral vascular accident) - left sided weakness - dysphagia  Gout  UTI (lower urinary tract infection)  Breast cancer    PSHX:  Shoulder fracture repair - left  Mastectomy - right - 2019    FAMILY HISTORY:  No pertinent family history in first degree relatives    SOCIAL HISTORY:  lifelong non-smoker; former EtOH overuse    Pulmonary Medications:   acetylcysteine 10%  Inhalation 4 milliLiter(s) Inhalation three times a day  ALBUTerol   0.042% 1.25 milliGRAM(s) Nebulizer three times a day      Antimicrobials:  cefTRIAXone   IVPB 1000 milliGRAM(s) IV Intermittent every 24 hours      Cardiology:      Other:  allopurinol 100 milliGRAM(s) Oral daily  atorvastatin 20 milliGRAM(s) Oral at bedtime  cyanocobalamin 1000 MICROGram(s) Oral daily  heparin   Injectable 5000 Unit(s) SubCutaneous every 12 hours  levETIRAcetam  Solution 500 milliGRAM(s) Oral two times a day  levothyroxine 75 MICROGram(s) Oral daily      Prn:  MEDICATIONS  (PRN):      Allergies    Toradol (Urticaria (Mild to Mod); Rash (Mild to Mod))    HOME MEDICATIONS: see  H & P    REVIEW OF SYSTEMS:  Constitutional: As per HPI  HEENT: Within normal limits  CV: As per HPI  Resp: As per HPI  GI: Within normal limits   : Within normal limits  Musculoskeletal: Within normal limits  Skin: Within normal limits  Neurological: Within normal limits  Psychiatric: Within normal limits  Endocrine: Within normal limits  Hematologic/Lymphatic: Within normal limits  Allergic/Immunologic: Within normal limits    [x] All other systems negative    OBJECTIVE:    Daily Height in cm: 162.56 (16 Mar 2022 19:05)      POCT Blood Glucose.: 140 mg/dL (17 Mar 2022 06:28)      PHYSICAL EXAM:  ICU Vital Signs Last 24 Hrs  T(C): 36.6 (17 Mar 2022 08:07), Max: 37.2 (16 Mar 2022 19:38)  T(F): 97.8 (17 Mar 2022 08:07), Max: 98.9 (16 Mar 2022 19:38)  HR: 93 (17 Mar 2022 08:07) (86 - 94)  BP: 99/67 (17 Mar 2022 08:07) (97/54 - 131/58)  BP(mean): 69 (17 Mar 2022 00:11) (66 - 98)  ABP: --  ABP(mean): --  RR: 18 (17 Mar 2022 08:07) (18 - 22)  SpO2: 100% (17 Mar 2022 08:07) (94% - 100%)    General: Awake. Alert. Cooperative. No distress. Appears stated age 	  HEENT:   Atraumatic. Normocephalic. Anicteric. Normal oral mucosa. PERRL. EOMI.  Neck: Supple. Trachea midline. Thyroid without enlargement/tenderness/nodules. No carotid bruit. No JVD.	  Cardiovascular: Regular rate and rhythm. S1 S2 normal. No murmurs, rubs or gallops.  Respiratory: Respirations unlabored. Clear to auscultation and percussion bilaterally. No curvature.  Abdomen: Soft. Non-tender. Non-distended. No organomegaly. No masses. Normal bowel sounds.  Extremities: Warm to touch. No clubbing or cyanosis. No pedal edema.  Pulses: 2+ peripheral pulses all extremities.	  Skin: Normal skin color. No rashes or lesions. No ecchymoses. No cyanosis. Warm to touch.  Lymph Nodes: Cervical, supraclavicular and axillary nodes normal  Neurological: Motor and sensory examination equal and normal. A and O x 3  Psychiatry: Appropriate mood and affect.      LABS:                          9.7    6.44  )-----------( 222      ( 16 Mar 2022 19:57 )             31.9     CBC    WBC  6.44 <==    Hemoglobin  9.7 <<==    Hematocrit  31.9 <==    Platelets  222 <==      137  |  98  |  26<H>  ----------------------------<  121<H>    03-16  4.9   |  24  |  0.70    LYTES    sodium  137 <==    potassium   4.9 <==    chloride  98 <==    carbon dioxide  24 <==    =============================================================================================  RENAL FUNCTION:    Creatinine:   0.70  <<==    BUN:   26 <==    ============================================================================================    calcium   10.3 <==    ============================================================================================  LFTs    AST:   22 <==     ALT:  15  <==     AP:  100  <=    Bili:  0.2  <=    Venous Blood Gas:   @ 21:50  --/--/--/--/--  VBG Lactate: 2.6    Venous Blood Gas:   @ 19:57  7.39/44/54/27/83.9  VBG Lactate: 3.1    < from: TTE with Doppler (w/Cont) (22 @ 14:08) >    Patient name: FRANKI MEHTA  YOB: 1935   Age: 86 (F)   MR#: [id number]  Study Date: 2022  [facility]  Study quality: difficult due to patient being  Referring Physician: Melinda Segundo MD  Blood Pressure: 109/63 mmHg  Height: 163 cm  Weight: 91 kg  BSA: 2 m2  ------------------------------------------------------------------------  PROCEDURE: Transthoracic echocardiogram with 2-D, M-Mode  and complete spectral and color flow Doppler. Verbal  consent was obtained for injection of  Ultrasonic Enhancing  Agent following a discussion of risks and benefits.  Following intravenous injection of Ultrasonic Enhancing  Agent , harmonic imaging was performed.  INDICATION: Endocarditis, valve unspecified (I38)  ------------------------------------------------------------------------  Dimensions:    Normal Values:  LA:     2.5    2.0 - 4.0 cm  Ao:     3.3    2.0 - 3.8 cm  SEPTUM: 1.6    0.6 - 1.2 cm  PWT:    0.9    0.6 - 1.1 cm  LVIDd:  3.6    3.0 - 5.6 cm  LVIDs:  2.5    1.8 - 4.0 cm  Derived variables:  LVMI: 77 g/m2  RWT: 0.50  Fractional short: 31 %  EF (Visual Estimate): 70-75 %  ------------------------------------------------------------------------  Conclusions:  1. Concentric left ventricular hypertrophy.  2. Hyperdynamic left ventricular systolic function.  Endocardial visualization enhanced with intravenous  injection of Ultrasonic Enhancing Agent (Definity).  3. The right ventricle is not well visualized; grossly  normal right ventricular systolic function.  Pt refused to proceed with exam.  Limited Doppler study.  No trans aortic gradients.  Unable to rule out endocarditis.  ------------------------------------------------------------------------  Confirmed on 2022 - 15:42:57 by COMPA Castillo  ------------------------------------------------------------------------  ---------------------------------------------------------------------------------------------------------------    MICROBIOLOGY:     COVID-19 PCR (22 @ 19:56)   COVID-19 PCR: NotDetec:       Urinalysis Basic - ( 16 Mar 2022 20:31 )    Color: Light Yellow / Appearance: Clear / S.020 / pH: x  Gluc: x / Ketone: Negative  / Bili: Negative / Urobili: Negative   Blood: x / Protein: Negative / Nitrite: Negative   Leuk Esterase: Negative / RBC: 5 /hpf / WBC 1 /HPF   Sq Epi: x / Non Sq Epi: 5 /hpf / Bacteria: Negative      RADIOLOGY:  [x ] Chest radiographs reviewed and interpreted by me    EXAM:  CT ABDOMEN AND PELVIS IC                          PROCEDURE DATE:  2022      INTERPRETATION:  CLINICAL INFORMATION: Abdominal pain and diarrhea.    COMPARISON: CT of the abdomen and pelvis from 2022.      FINDINGS:  LOWER CHEST: Bibasilar subsegmental atelectasis. Coronary artery   calcification.    LIVER: Within normal limits.  BILE DUCTS: Normal caliber.  GALLBLADDER: Cholelithiasis.  SPLEEN: Within normal limits.  PANCREAS: Withinnormal limits.  ADRENALS: Within normal limits.  KIDNEYS/URETERS: No hydronephrosis. Bilateral renal atrophy.    BLADDER: Within normal limits.  REPRODUCTIVE ORGANS: Ovaries within normal limits. Atrophic uterus.    BOWEL: Gastrostomy tube with retention balloon in the body of the   stomach. No bowel obstruction. Scattered sigmoid diverticulosis without   evidence of diverticulitis. Interval decrease in size of a fluid   collection in the region of the previously perforated appendicitis   measuring 2.8 x 1.5 cm previously measuring 4.0 x 2.2 cm. Slight increase   in mild adjacent inflammatory change.  PERITONEUM: No ascites. No pneumoperitoneum.  VESSELS: Within normal limits.  RETROPERITONEUM/LYMPH NODES: No lymphadenopathy.  ABDOMINAL WALL:Subcutaneous injection granulomas. Marked diffuse muscle   atrophy. Diastases of the abdominis rectus muscles. Small fat-containing   umbilical hernia.  BONES: Degenerative changes of the spine. T11 and L1 vertebral body   compression fracture deformities, not significantly changed.    IMPRESSION:  Interval decrease in size of a fluid collection in the region of the   previously perforated appendicitis with slight increase in mild adjacent   inflammatory change for which a superimposed acute infection cannot be   excluded.    RAYMON GUTIÉRREZ MD; Resident Radiologist  This document has been electronically signed.  GRACE MESA MD; Attending Radiologist  This document has been electronically signed. Mar 17 2022 12:37AM  ---------------------------------------------------------------------------------------------------------------  EXAM:  XR CHEST PORTABLE ROUTINE 1V                          PROCEDURE DATE:  03/10/2022      INTERPRETATION:  TECHNIQUE: A single AP view of the chest was obtained.   Ordered time:   3/10/2022 9:39 AM    COMPARISON: 2022    CLINICAL INFORMATION: Acute respiratory failure    FINDINGS:  Patient status post internal fixation of the left shoulder.  The heart is not well assessed on an AP film.  The right lung is clear. There is patchy airspace disease at the left   lung base,likely atelectasis.  There may be a small left pleural effusion.  There is no pneumothorax.    IMPRESSION:    Patchy airspace disease at the left lung base, likely atelectasis.   Underlying infection is not totally excluded.  Possible small left effusion    DIANE CLARK MD; Attending Radiologist  This document has been electronically signed. Mar 11 2022  4:57PM  ---------------------------------------------------------------------------------------------------------------           [facility] - [facility] - CONSULT NOTE    HPI: 86 year old gentlewoman, lifelong non-smoker, without history of intrinsic lung disease. 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 She also has a history of HTN, HLD, CAD s/p MI with preserved LVEF and moderate aortic stenosis. The patient was admitted to [facility] in 2021 with fever and abdominal pain. She was found to have perforated appendicitis with abscess formation. She was treated with tube drainage and antibiotics for a polymicrobial infection. Admission CT had debris within the left lobar and more distal airways of the left lower lobe as well as some debris within the right lower lobe airway - there was complete atelectasis of the left lower lobe and subsegmental atelectatic changes within the left upper lobe and dependent portions of the right lung. Hospital course was complicated by respiratory failure due to mucous plugging with complete collapse of the left lung. She underwent bronchoscopy with removal of copious amounts of purulent secretions from throughout the bronchial tree - there was severe tracheobronchomalacia. She was treated with bronchodilators, mucolytic nebs, chest vest and nocturnal AVAPS with expansion of the left upper lobe and some reexpansion of the left lower lobe. She was discharged to rehab on a 3lpm nasal canula with plans to continue medical and mechanical therapy as well as nocturnal BIPAP. She has been doing "well" at rehab and reports that she has been out of bed and into the chair. It does not appear that she has been wearing nocturnal NIV. The patient is sent to the ER from rehab due to bright red blood per rectum with diarrhea over the last few days. The patient is hemodynamically stable without significant anemia. She has no shortness of breath or hypoxemia on a nasal canula @ 3lpm. She has no cough, sputum production, chest congestion or wheeze. No fevers, chills or sweats. No chest pain/pressure or palpitations. No lightheadedness of dizziness. No abdominal pain, nausea and vomiting. Asked to evaluate.    PMHX:  Mucous plugging with left lung atelectasis  HTN (hypertension)  HLD (dyslipidemia)  CAD (coronary artery disease)  MI (myocardial infarction)  Aortic stenosis  CVA (cerebral vascular accident) - left sided weakness - dysphagia  Gout  UTI (lower urinary tract infection)  Breast cancer    PSHX:  Shoulder fracture repair - left  Mastectomy - right - 2019    FAMILY HISTORY:  No pertinent family history in first degree relatives    SOCIAL HISTORY:  lifelong non-smoker; former EtOH overuse; bedbound; has been residing at [facility]    Pulmonary Medications:   acetylcysteine 10%  Inhalation 4 milliLiter(s) Inhalation three times a day  ALBUTerol   0.042% 1.25 milliGRAM(s) Nebulizer three times a day      Antimicrobials:  cefTRIAXone   IVPB 1000 milliGRAM(s) IV Intermittent every 24 hours      Cardiology:      Other:  allopurinol 100 milliGRAM(s) Oral daily  atorvastatin 20 milliGRAM(s) Oral at bedtime  cyanocobalamin 1000 MICROGram(s) Oral daily  heparin   Injectable 5000 Unit(s) SubCutaneous every 12 hours  levETIRAcetam  Solution 500 milliGRAM(s) Oral two times a day  levothyroxine 75 MICROGram(s) Oral daily      Prn:  MEDICATIONS  (PRN):      Allergies    Toradol (Urticaria (Mild to Mod); Rash (Mild to Mod))    HOME MEDICATIONS: see  H & P    REVIEW OF SYSTEMS:  Constitutional: As per HPI  HEENT: Within normal limits  CV: As per HPI  Resp: As per HPI  GI: dysphagia -> PEG - hematochezia  : Within normal limits  Musculoskeletal: Within normal limits  Skin: Within normal limits  Neurological: CVA - > left sided weakness/plegia  Psychiatric: Within normal limits  Endocrine: Within normal limits  Hematologic/Lymphatic: Within normal limits  Allergic/Immunologic: Within normal limits    [x] All other systems negative    OBJECTIVE:    Daily Height in cm: 162.56 (16 Mar 2022 19:05)      POCT Blood Glucose.: 140 mg/dL (17 Mar 2022 06:28)      PHYSICAL EXAM:  ICU Vital Signs Last 24 Hrs  T(C): 36.6 (17 Mar 2022 08:07), Max: 37.2 (16 Mar 2022 19:38)  T(F): 97.8 (17 Mar 2022 08:07), Max: 98.9 (16 Mar 2022 19:38)  HR: 93 (17 Mar 2022 08:07) (86 - 94)  BP: 99/67 (17 Mar 2022 08:07) (97/54 - 131/58)  BP(mean): 69 (17 Mar 2022 00:11) (66 - 98)  ABP: --  ABP(mean): --  RR: 18 (17 Mar 2022 08:07) (18 - 22)  SpO2: 100% (17 Mar 2022 08:07) (94% - 100%) on 3lpm nasal canula    General: Awake and alert. Cooperative. No distress Appears stated age. Bedbound  HEENT: Atraumatic. Normocephalic. Anicteric. Normal oral mucosa. PERRL. EOMI. Mallampati class IV airway.  Neck: Supple. Trachea midline. Thyroid without enlargement/tenderness/nodules. No carotid bruit. No JVD. Short and wide  Cardiovascular: Regular rate and rhythm. S1 S2 normal. III/VI systolic murmur  Respiratory: Respirations unlabored. Scattered rales. Bilateral equal breath sounds. No wheeze. No curvature.  Abdomen: Soft. Non-tender. Non-distended. No organomegaly. No masses. Normal bowel sounds. Obese. PEG.  Extremities: Warm to touch. No clubbing or cyanosis. No pedal edema. Large legs  Pulses: 2+ peripheral pulses all extremities.	  Skin: Normal skin color. No rashes or lesions. No ecchymoses. No cyanosis. Warm to touch.  Lymph Nodes: Cervical, supraclavicular and axillary nodes normal  Neurological: Left lower extremity plegia with contraction. Left upper extremity is quite weak. A and O x 3  Psychiatry: Appropriate mood and affect.      LABS:                          9.7    6.44  )-----------( 222      ( 16 Mar 2022 19:57 )             31.9     CBC    WBC  6.44 <==    Hemoglobin  9.7 <<==    Hematocrit  31.9 <==    Platelets  222 <==      137  |  98  |  26<H>  ----------------------------<  121<H>    03-16  4.9   |  24  |  0.70    LYTES    sodium  137 <==    potassium   4.9 <==    chloride  98 <==    carbon dioxide  24 <==    =============================================================================================  RENAL FUNCTION:    Creatinine:   0.70  <<==    BUN:   26 <==    ============================================================================================    calcium   10.3 <==    ============================================================================================  LFTs    AST:   22 <==     ALT:  15  <==     AP:  100  <=    Bili:  0.2  <=    Venous Blood Gas:   @ 21:50  --/--/--/--/--  VBG Lactate: 2.6    Venous Blood Gas:   @ 19:57  7.39/44/54/27/83.9  VBG Lactate: 3.1    < from: TTE with Doppler (w/Cont) (22 @ 14:08) >    Patient name: FRANKI MEHTA  YOB: 1935   Age: 86 (F)   MR#: [id number]  Study Date: 2022  [facility]  Study quality: difficult due to patient being  Referring Physician: Melinda Segundo MD  Blood Pressure: 109/63 mmHg  Height: 163 cm  Weight: 91 kg  BSA: 2 m2  ------------------------------------------------------------------------  PROCEDURE: Transthoracic echocardiogram with 2-D, M-Mode  and complete spectral and color flow Doppler. Verbal  consent was obtained for injection of  Ultrasonic Enhancing  Agent following a discussion of risks and benefits.  Following intravenous injection of Ultrasonic Enhancing  Agent , harmonic imaging was performed.  INDICATION: Endocarditis, valve unspecified (I38)  ------------------------------------------------------------------------  Dimensions:    Normal Values:  LA:     2.5    2.0 - 4.0 cm  Ao:     3.3    2.0 - 3.8 cm  SEPTUM: 1.6    0.6 - 1.2 cm  PWT:    0.9    0.6 - 1.1 cm  LVIDd:  3.6    3.0 - 5.6 cm  LVIDs:  2.5    1.8 - 4.0 cm  Derived variables:  LVMI: 77 g/m2  RWT: 0.50  Fractional short: 31 %  EF (Visual Estimate): 70-75 %  ------------------------------------------------------------------------  Conclusions:  1. Concentric left ventricular hypertrophy.  2. Hyperdynamic left ventricular systolic function.  Endocardial visualization enhanced with intravenous  injection of Ultrasonic Enhancing Agent (Definity).  3. The right ventricle is not well visualized; grossly  normal right ventricular systolic function.  Pt refused to proceed with exam.  Limited Doppler study.  No trans aortic gradients.  Unable to rule out endocarditis.  ------------------------------------------------------------------------  Confirmed on 2022 - 15:42:57 by Kole Mcfarland M.D.  FASE  ------------------------------------------------------------------------  ---------------------------------------------------------------------------------------------------------------    MICROBIOLOGY:     COVID-19 PCR (22 @ 19:56)   COVID-19 PCR: NotDetec:       Urinalysis Basic - ( 16 Mar 2022 20:31 )    Color: Light Yellow / Appearance: Clear / S.020 / pH: x  Gluc: x / Ketone: Negative  / Bili: Negative / Urobili: Negative   Blood: x / Protein: Negative / Nitrite: Negative   Leuk Esterase: Negative / RBC: 5 /hpf / WBC 1 /HPF   Sq Epi: x / Non Sq Epi: 5 /hpf / Bacteria: Negative      RADIOLOGY:  [x ] Chest radiographs reviewed and interpreted by me    EXAM:  CT ABDOMEN AND PELVIS IC                          PROCEDURE DATE:  2022      INTERPRETATION:  CLINICAL INFORMATION: Abdominal pain and diarrhea.    COMPARISON: CT of the abdomen and pelvis from 2022.      FINDINGS:  LOWER CHEST: Bibasilar subsegmental atelectasis. Coronary artery   calcification.    LIVER: Within normal limits.  BILE DUCTS: Normal caliber.  GALLBLADDER: Cholelithiasis.  SPLEEN: Within normal limits.  PANCREAS: Within normal limits.  ADRENALS: Within normal limits.  KIDNEYS/URETERS: No hydronephrosis. Bilateral renal atrophy.    BLADDER: Within normal limits.  REPRODUCTIVE ORGANS: Ovaries within normal limits. Atrophic uterus.    BOWEL: Gastrostomy tube with retention balloon in the body of the   stomach. No bowel obstruction. Scattered sigmoid diverticulosis without   evidence of diverticulitis. Interval decrease in size of a fluid   collection in the region of the previously perforated appendicitis   measuring 2.8 x 1.5 cm previously measuring 4.0 x 2.2 cm. Slight increase   in mild adjacent inflammatory change.  PERITONEUM: No ascites. No pneumoperitoneum.  VESSELS: Within normal limits.  RETROPERITONEUM/LYMPH NODES: No lymphadenopathy.  ABDOMINAL WALL:Subcutaneous injection granulomas. Marked diffuse muscle   atrophy. Diastases of the abdominis rectus muscles. Small fat-containing   umbilical hernia.  BONES: Degenerative changes of the spine. T11 and L1 vertebral body   compression fracture deformities, not significantly changed.    IMPRESSION:  Interval decrease in size of a fluid collection in the region of the   previously perforated appendicitis with slight increase in mild adjacent   inflammatory change for which a superimposed acute infection cannot be   excluded.    RAYMON GUTIÉRREZ MD; Resident Radiologist  This document has been electronically signed.  GRACE MESA MD; Attending Radiologist  This document has been electronically signed. Mar 17 2022 12:37AM  ---------------------------------------------------------------------------------------------------------------  EXAM:  XR CHEST PORTABLE ROUTINE 1V                          PROCEDURE DATE:  03/10/2022      INTERPRETATION:  TECHNIQUE: A single AP view of the chest was obtained.   Ordered time:   3/10/2022 9:39 AM    COMPARISON: 2022    CLINICAL INFORMATION: Acute respiratory failure    FINDINGS:  Patient status post internal fixation of the left shoulder.  The heart is not well assessed on an AP film.  The right lung is clear. There is patchy airspace disease at the left   lung base, likely atelectasis.  There may be a small left pleural effusion.  There is no pneumothorax.    IMPRESSION:    Patchy airspace disease at the left lung base, likely atelectasis.   Underlying infection is not totally excluded.  Possible small left effusion    DIANE CLARK MD; Attending Radiologist  This document has been electronically signed. Mar 11 2022  4:57PM  ---------------------------------------------------------------------------------------------------------------

## 2022-03-17 NOTE — CONSULT NOTE ADULT - ASSESSMENT
#BRBPR: Patient unable to explain sx's; rectal exam with normal stool- neg for melena or blood. FOBT positive. DDX includes diverticular bleed, hemorrhoids, AVM.      Recommendations:     - c/w monitor hbg and stool for blood    - transfuse as needed for hgb<7  - no indication for procedure at this time  - nutrition consult for tube feeds   - please call back with questions    Thank you for this consult. 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     #BRBPR: Patient unable to explain sx's; rectal exam with normal stool- neg for melena or blood. FOBT positive. DDX includes diverticular bleed, hemorrhoids, AVM.      Recommendations:     - c/w monitor hbg and stool for blood    - transfuse as needed for hgb<7  - no indication for procedure at this time  - nutrition consult for tube feed management     Thank you for this consult. Will continue to follow     Debbie Carrillo   PGY2, IM

## 2022-03-17 NOTE — H&P ADULT - HISTORY OF PRESENT ILLNESS
86 F      with PMHx of CVA with residual L sided weakness/plegia, and dysphagia / PEG, CAD s/p MI with preserved LVEF and moderate AS, HTN, gout, right breast CA s/p mastectomy in 2019     presents to the ED c/o BRB per rectum x3 days associated with nausea, and mild abdominal pain.    Denies vomiting, fever, or recent abx use.

## 2022-03-17 NOTE — CONSULT NOTE ADULT - SUBJECTIVE AND OBJECTIVE BOX
CHIEF COMPLAINT:Patient is a 86y old  Female who presents with a chief complaint of rectal bleed (17 Mar 2022 09:50)      HPI:  86 with PMHx of CVA with residual L sided weakness/plegia, and dysphagia / PEG, CAD s/p MI with preserved LVEF and moderate AS, HTN, gout, right breast CA s/p mastectomy in 2019   presents to the ED c/o BRB per rectum x3 days associated with nausea, and mild abdominal pain.  Denies vomiting, fever, or recent abx use.       PAST MEDICAL & SURGICAL HISTORY:  MI (myocardial infarction)    HTN (hypertension)    Personal history of asbestosis    Gout    Dyslipidemia    UTI (lower urinary tract infection)    ETOH abuse    CVA (cerebral vascular accident)    History of left shoulder fracture    History of benign breast tumor        MEDICATIONS  (STANDING):  acetylcysteine 10%  Inhalation 4 milliLiter(s) Inhalation three times a day  ALBUTerol   0.042% 1.25 milliGRAM(s) Nebulizer three times a day  allopurinol 100 milliGRAM(s) Oral daily  atorvastatin 20 milliGRAM(s) Oral at bedtime  cefTRIAXone   IVPB 1000 milliGRAM(s) IV Intermittent every 24 hours  cyanocobalamin 1000 MICROGram(s) Oral daily  heparin   Injectable 5000 Unit(s) SubCutaneous every 12 hours  levETIRAcetam  Solution 500 milliGRAM(s) Oral two times a day  levothyroxine 75 MICROGram(s) Oral daily    MEDICATIONS  (PRN):      FAMILY HISTORY:  No pertinent family history in first degree relatives        SOCIAL HISTORY:    [ x] Non-smoker  [ ] Smoker  [ ] Alcohol    Allergies    Toradol (Urticaria (Mild to Mod); Rash (Mild to Mod))    Intolerances    	    REVIEW OF SYSTEMS:  CONSTITUTIONAL: No fever, weight loss, or fatigue  EYES: No eye pain, visual disturbances, or discharge  ENT:  No difficulty hearing, tinnitus, vertigo; No sinus or throat pain  NECK: No pain or stiffness  RESPIRATORY: No cough, wheezing, chills or hemoptysis; + Shortness of Breath  CARDIOVASCULAR: No chest pain, palpitations, passing out, dizziness, or leg swelling  GASTROINTESTINAL: No abdominal or epigastric pain. No nausea, vomiting, or hematemesis; No diarrhea or constipation. No melena or hematochezia.  GENITOURINARY: No dysuria, frequency, hematuria, or incontinence  NEUROLOGICAL: No headaches, memory loss, loss of strength, numbness, or tremors  SKIN: No itching, burning, rashes, or lesions   LYMPH Nodes: No enlarged glands  ENDOCRINE: No heat or cold intolerance; No hair loss  MUSCULOSKELETAL: No joint pain or swelling; No muscle, back, or extremity pain  PSYCHIATRIC: No depression, anxiety, mood swings, or difficulty sleeping  HEME/LYMPH: No easy bruising, or bleeding gums  ALLERGY AND IMMUNOLOGIC: No hives or eczema	    [ ] All others negative	  [ ] Unable to obtain    PHYSICAL EXAM:  T(C): 36.6 (03-17-22 @ 08:07), Max: 37.2 (03-16-22 @ 19:38)  HR: 93 (03-17-22 @ 08:07) (86 - 94)  BP: 99/67 (03-17-22 @ 08:07) (97/54 - 131/58)  RR: 18 (03-17-22 @ 08:07) (18 - 22)  SpO2: 100% (03-17-22 @ 08:07) (94% - 100%)  Wt(kg): --  I&O's Summary      Appearance: Normal	  HEENT:   Normal oral mucosa, PERRL, EOMI	  Lymphatic: No lymphadenopathy  Cardiovascular: Normal S1 S2, No JVD, +murmurs, No edema  Respiratory:rhonchi  Psychiatry: A & O x 3, Mood & affect appropriate  Gastrointestinal:  Soft, Non-tender, + BS	  Skin: No rashes, No ecchymoses, No cyanosis	  Neurologic: Non-focal  Extremities: Normal range of motion, No clubbing, cyanosis or edema  Vascular: Peripheral pulses palpable 2+ bilaterally    TELEMETRY: 	    ECG:  	  RADIOLOGY:  OTHER: 	  	  LABS:	 	    CARDIAC MARKERS:                              9.7    6.44  )-----------( 222      ( 16 Mar 2022 19:57 )             31.9     03-16    137  |  98  |  26<H>  ----------------------------<  121<H>  4.9   |  24  |  0.70    Ca    10.3      16 Mar 2022 19:57    TPro  7.0  /  Alb  3.5  /  TBili  0.2  /  DBili  x   /  AST  22  /  ALT  15  /  AlkPhos  100  03-16    proBNP:   Lipid Profile:   HgA1c:   TSH:       PREVIOUS DIAGNOSTIC TESTING:    < from: 12 Lead ECG (01.20.22 @ 08:25) >  Diagnosis Line SINUS TACHYCARDIA  LEFT AXIS DEVIATION  INFERIOR INFARCT (CITED ON OR BEFORE 20-OCT-2018)  CANNOT RULE OUT ANTERIOR INFARCT , AGE UNDETERMINED  ABNORMAL ECG  WHEN COMPARED WITH ECG OF 24-DEC-2021 08:28,  NO SIGNIFICANT CHANGE WAS FOUND  < from: Xray Chest 1 View- PORTABLE-Routine (Xray Chest 1 View- PORTABLE-Routine .) (03.10.22 @ 09:39) >  Patchy airspace disease at the left lung base, likely atelectasis.   Underlying infection is not totally excluded.  Possible small left effusion    < from: TTE with Doppler (w/Cont) (01.21.22 @ 14:08) >  Mitral Valve: Normal mitral valve.  Aortic Valve/Aorta: Calcified trileaflet aortic valve with  decreased opening. Mild aortic regurgitation.  Aortic Root: 3.3 cm.  LVOT diameter: 2.2 cm.  Left Atrium: Normal left atrium.  Left Ventricle: Hyperdynamic left ventricular systolic  function.   Endocardial visualization enhanced with  intravenous injection of Ultrasonic Enhancing Agent  (Definity). Concentric left ventricular hypertrophy.  Right Heart: Normal right atrium. The right ventricle is  not well visualized; grossly normal right ventricular  systolic function. Normal tricuspid valve.  Pericardium/Pleura: Normal pericardium with no pericardial  effusion.  ------------------------------------------------------------------------  Conclusions:  1. Concentric left ventricular hypertrophy.  2. Hyperdynamic left ventricular systolic function.  Endocardial visualization enhanced with intravenous  injection of Ultrasonic Enhancing Agent (Definity).  3. The right ventricle is not well visualized; grossly  normal right ventricular systolic function.  Pt refused to proceed with exam.  Limited Doppler study.  No trans aortic gradients.  Unable to rule out endocarditis.    < from: CT Abdomen and Pelvis w/ IV Cont (03.16.22 @ 22:41) >  Interval decrease in size of a fluid collection in the region of the   previously perforated appendicitis with slight increase in mild adjacent   inflammatory change for which a superimposed acute infection cannot be   excluded.

## 2022-03-18 NOTE — DIETITIAN INITIAL EVALUATION ADULT. - REASON INDICATOR FOR ASSESSMENT
Consult ordered for: tube feeding, EN PTA  Source: EMR, communication with team, Pt with AMS (inappropriate for interview)

## 2022-03-18 NOTE — CONSULT NOTE ADULT - ASSESSMENT
87 y/o F PMHx CVA s/p PEG and recent admission 12/2021 - 01/2022 for perforated appendicitis with abscess and ?IE with staph epi bacteremia s/p 6 week course of vanc/ertapenem, now sent in from [facility] for BRBPR. ID consulted to r/o infection.     Afebrile, HD stable  WBC 8.6K  Urinalysis no pyuria  Urine Culture no growth  CT Abd/Pelv:  decreased fluid collection from prior, mild inflammatory change in region of prior appendicitis    Impression:  #H/O Perforated Appendicitis, Abscess - s/p 6 week course of antibiotics completed 02/03/2022. No evidence of active infection at this time. CT findings overall improved  #H/O ?Endocarditis - prior staph epi bacteremia noted on several blood cultures in 12/2021. TTE at that time with possible vegetation on AV. S/p 6 week course of vanc    Recs:  - d/c ceftriaxone and monitor off antibiotics, no evidence of active infection    Plan discussed with ACP    Arun Mercedes MD  Infectious Disease Fellow  Available on [employer] Teams  Before 9AM or after 5PM: [phone]

## 2022-03-18 NOTE — DIETITIAN INITIAL EVALUATION ADULT. - ORAL INTAKE PTA/DIET HISTORY
Pt with AMS (inappropriate for interview) with no family at bedside, admit from SNF. Per paper chart: Hx of dysphagia - EN feeds via PEG. EN regimen: Glucerna 1.5 @ 55 mL/hr until 1000 mL perfused. Regimen to provide 1500 humberto, 82.5 Gm protein, 759 mL free H2O. Per EMR, no food allergies noted.

## 2022-03-18 NOTE — PROVIDER CONTACT NOTE (OTHER) - BACKGROUND
Pt is a transfer from [address] [place] [address]. Admitted for melena. PMH: CVA with residual L sided weakness, UTI. gout, MI, dysipidemia, HTN and etoh abuse. Pt is currently on 3LNC. Pt has PEG tube

## 2022-03-18 NOTE — DIETITIAN INITIAL EVALUATION ADULT. - ADD RECOMMEND
2) RD to remain available to adjust EN formulary, volume/rate PRN. 3) Monitor nutritional intake/tolerance, weights, labs, hydration status, skin integrity, BM, GI symptoms.

## 2022-03-18 NOTE — PROVIDER CONTACT NOTE (OTHER) - SITUATION
Left arm and Left leg is contracted
Pt has order for bipap at night. When respiratory came to put bipap on, pt refused. Education given and pt still refused, started to get agitated and cry

## 2022-03-18 NOTE — PROVIDER CONTACT NOTE (OTHER) - ACTION/TREATMENT ORDERED:
pending orders
ACP came to assess pt - suggested bipap mask that only went over nose. RT came back with that piece, but patient got angry again and still refused. Continue 3L NC and monitor O2.

## 2022-03-18 NOTE — CONSULT NOTE ADULT - ATTENDING COMMENTS
The mild inflammatory changes adjacent to her recent intraabdominal infection does not correlate with a clinical infection.     Signing off     Sergei Maria MD   Infectious Disease   Available on TEAMS. After 5PM and on weekends please page fellow on call or call [phone]

## 2022-03-18 NOTE — PROVIDER CONTACT NOTE (OTHER) - ASSESSMENT
A&Ox3. VSS on 3L NC. Pt completely refusing bipap. Pt stated "If I have to wear this all night I will leave".

## 2022-03-18 NOTE — CONSULT NOTE ADULT - CONSULT REASON
BRBPR
chronic hypoxic respiratory failure; mucous plugging; atelectasis; weak cough; dysphagia; ; tracheobronchomalacia; PEG in place; h/o CVA with left sided plegia
r/o infection
chf/ ashd

## 2022-03-18 NOTE — DIETITIAN INITIAL EVALUATION ADULT. - ENTERAL
1) Recommend continuous feeds via PEG: Glucerna 1.5 initiated @ 10mL/hr increased 10mL q3h to goal rate of 1) Recommend continuous feeds via PEG: Glucerna 1.5 initiated @ 10mL/hr increased 10mL q3h to goal rate of 60 mL/hr x 18 hr to provide 1080 mL, 1620 humberto (27 humberto/kg), 89 gm protein (1.5 Gm/kg), 820 mL free H2O (Based on upper IBW 59.8 kg). Defer fluid needs to team.

## 2022-03-18 NOTE — DIETITIAN INITIAL EVALUATION ADULT. - PERTINENT LABORATORY DATA
03-18 Na 140 mmol/L Glu 129 mg/dL<H> K+ 4.1 mmol/L Cr 0.77 mg/dL BUN 15 mg/dL    Hgb 9.5 g/dL<L> Hct 32.3 %<L>   Glucose, Serum: 129 mg/dL *H*    Hgba1c 6.3% ()

## 2022-03-18 NOTE — DIETITIAN INITIAL EVALUATION ADULT. - OTHER INFO
Pt is tolerating bolus feeds of Glucerna 1.2 (250 mL q6H), tolerating without any overt signs of GI distress per team. Last BM was yesterday 03/17  , no diarrhea/constipation (no BRB noted). Bowel regimen: none.     UBW unknown. Current admission with no updated weight. Bedscale weight not functioning at time of RD visit. Visually Pt appears well nourished. RD to continue to monitor weight trends as available/able.     Hx of DM, most recent Hgab1c 6.3% (). No insulin regimen at this time, glucose well controlled. Continue to monitor.

## 2022-03-18 NOTE — CONSULT NOTE ADULT - SUBJECTIVE AND OBJECTIVE BOX
Patient is a 86y old  Female who presents with a chief complaint of rectal bleed (18 Mar 2022 10:02)    HPI:  86 F with PMHx of CVA with residual L sided weakness/plegia, and dysphagia / PEG, CAD s/p MI with preserved LVEF and moderate AS, HTN, gout, right breast CA s/p mastectomy in 2019 presents to the ED c/o BRB per rectum x3 days associated with nausea, and mild abdominal pain. Denies vomiting, fever, or recent abx use.     ID consulted to r/o infection. Patient well appearing with no fever/chills, no abdominal pain, no dysuria.        REVIEW OF SYSTEMS  [  ] ROS unobtainable because:    [ X ] All other systems negative except as noted below    Constitutional:  [ ] fever [ ] chills  [ ] weight loss  [ ]night sweat  [ ]poor appetite/PO intake [ ]fatigue   Skin:  [ ] rash [ ] phlebitis	  Eyes: [ ] icterus [ ] pain  [ ] discharge	  ENMT: [ ] sore throat  [ ] thrush [ ] ulcers [ ] exudates [ ]anosmia  Respiratory: [ ] dyspnea [ ] hemoptysis [ ] cough [ ] sputum	  Cardiovascular:  [ ] chest pain [ ] palpitations [ ] edema	  Gastrointestinal:  [ ] nausea [ ] vomiting [ ] diarrhea [ ] constipation [ ] pain	  Genitourinary:  [ ] dysuria [ ] frequency [ ] hematuria [ ] discharge [ ] flank pain  [ ] incontinence  Musculoskeletal:  [ ] myalgias [ ] arthralgias [ ] arthritis  [ ] back pain  Neurological:  [ ] headache [ ] weakness [ ] seizures  [ ] confusion/altered mental status    prior hospital charts reviewed [V]  primary team notes reviewed [V]  other consultant notes reviewed [V]    PAST MEDICAL & SURGICAL HISTORY:  MI (myocardial infarction)  HTN (hypertension)  Personal history of asbestosis  Gout  Dyslipidemia  UTI (lower urinary tract infection)  ETOH abuse  CVA (cerebral vascular accident)  History of left shoulder fracture  History of benign breast tumor      FAMILY HISTORY:  No pertinent family history in first degree relatives    SOCIAL HISTORY:  Denied smoking    Allergies  Toradol (Urticaria (Mild to Mod); Rash (Mild to Mod))        ANTIMICROBIALS:      ANTIMICROBIALS (past 90 days):   MEDICATIONS  (STANDING):  cefTRIAXone   IVPB   100 mL/Hr IV Intermittent (22 @ 12:09)   100 mL/Hr IV Intermittent (22 @ 12:26)    piperacillin/tazobactam IVPB...   200 mL/Hr IV Intermittent (22 @ 01:19)        MEDICATIONS  (STANDING):  acetylcysteine 20%  Inhalation 4 three times a day  albuterol/ipratropium for Nebulization 3 every 6 hours  allopurinol 100 daily  atorvastatin 20 at bedtime  guaiFENesin Oral Liquid (Sugar-Free) 300 every 6 hours  heparin   Injectable 5000 every 12 hours  levETIRAcetam  Solution 500 two times a day  levothyroxine 75 daily  sodium chloride 3%  Inhalation 4 every 12 hours      VITALS:  Vital Signs Last 24 Hrs  T(F): 98.1 (22 @ 10:06), Max: 98.9 (22 @ 19:38)  Vital Signs Last 24 Hrs  HR: 102 (22 @ 10:06) (93 - 107)  BP: 105/50 (22 @ 10:06) (103/63 - 134/63)  RR: 18 (22 @ 10:06)  SpO2: 96% (22 @ 10:06) (92% - 99%)  Wt(kg): --    PHYSICAL EXAM:  Constitutional: non-toxic, no distress  HEAD/EYES: anicteric, no conjunctival injection  ENT:  supple, no thrush  Cardiovascular:   normal S1/S2  Respiratory:  +BS bilaterally  GI:  +PEG, minimally tender, no guarding, normal bowel sounds  :  no flanagan, no CVA tenderness  Musculoskeletal:  contracted LUE/LLL extremities  Neurologic: awake and alert, L sided weakness  Skin:  no rash, no erythema, no phlebitis  Heme/Onc: no lymphadenopathy   Psychiatric:  awake, alert, appropriate mood      Labs:                        9.5    8.62  )-----------( 231      ( 18 Mar 2022 10:10 )             32.3     -    140  |  103  |  15  ----------------------------<  129<H>  4.1   |  24  |  0.77    Ca    9.0      18 Mar 2022 10:10    TPro  7.0  /  Alb  3.5  /  TBili  0.2  /  DBili  x   /  AST  22  /  ALT  15  /  AlkPhos  100  03-16      WBC Trend:  WBC Count: 8.62 (22 @ 10:10)  WBC Count: 6.50 (22 @ 19:09)  WBC Count: 6.44 (22 @ 19:57)    Auto Neutrophil #: 3.88 K/uL (22 @ 19:57)  Auto Neutrophil #: 4.10 K/uL (22 @ 00:51)  Auto Neutrophil #: 6.12 K/uL (21 @ 06:47)  Auto Neutrophil #: 8.90 K/uL (21 @ 07:18)  Auto Neutrophil #: 10.67 K/uL (21 @ 11:12)    Auto Eosinophil %: 0.9 % (22 @ 19:57)    Urinalysis Basic - ( 16 Mar 2022 20:31 )    Color: Light Yellow / Appearance: Clear / S.020 / pH: x  Gluc: x / Ketone: Negative  / Bili: Negative / Urobili: Negative   Blood: x / Protein: Negative / Nitrite: Negative   Leuk Esterase: Negative / RBC: 5 /hpf / WBC 1 /HPF   Sq Epi: x / Non Sq Epi: 5 /hpf / Bacteria: Negative      MICROBIOLOGY:  Culture - Urine (collected 16 Mar 2022 22:42)  Source: Clean Catch Clean Catch (Midstream)  Final Report:    <10,000 CFU/mL Normal Urogenital Ck    Culture - Blood (collected 2022 10:12)  Source: .Blood Blood-Peripheral  Final Report:    No Growth Final    Culture - Blood (collected 2022 10:12)  Source: .Blood Blood-Peripheral  Final Report:    No Growth Final    Culture - Urine (collected 2022 16:37)  Source: Clean Catch Clean Catch (Midstream)  Final Report:    >100,000 CFU/ml Enterococcus faecium (vancomycin resistant)    <10,000 CFU/ml Normal Urogenital ck present  Organism: Enterococcus faecium (vancomycin resistant)  Organism: Enterococcus faecium (vancomycin resistant)    Sensitivities:      -  Ampicillin: R >8 Predicts results to ampicillin/sulbactam, amoxacillin-clavulanate and  piperacillin-tazobactam.      -  Ciprofloxacin: R >2      -  Daptomycin: SDD 4 The breakpoint for SDD (sensitive dose dependent)is based on a dosage regimen of 8-12 mg/kg administered every 24 h in adults and is intended for serious infections due to E. faecium. Consultation with an infectious diseases specialist is recommended.      -  Levofloxacin: R >4      -  Linezolid: S 2      -  Nitrofurantoin: R >64 Should not be used to treat pyelonephritis.      -  Penicillin: R >8      -  Rifampin: R >2      -  Tetra/Doxy: S <=1      -  Vancomycin: R >16      Method Type: TAWANA    Culture - Blood (collected 2022 12:21)  Source: .Blood Blood-Peripheral  Final Report:    No Growth Final    Culture - Blood (collected 2022 12:21)  Source: .Blood Blood-Peripheral  Final Report:    No Growth Final    Culture - Fungal, Bronchial (collected 2022 18:48)  Source: BAL Bronchoalveolar Lavage  Final Report:    Rare Yeast    Culture - Bronchial (collected 2022 18:48)  Source: Bronch Wash Bronchoalveolar Lavage  Final Report:    Normal Respiratory Ck present    Culture - Acid Fast - Bronchial w/Smear (collected 2022 18:48)  Source: BAL Bronchoalveolar Lavage  Final Report:    No acid fast bacilli isolated after 6 weeks.    Culture - Blood (collected 24 Dec 2021 17:38)  Source: .Blood Blood-Venous  Final Report:    No Growth Final    COVID-19 PCR: NotDetec (22 @ 19:56)  COVID-19 PCR: NotDetec (22 @ 11:59)  COVID-19 PCR: NotDetec (22 @ 16:12)  COVID-19 PCR: NotDetec (22 @ 12:29)  COVID-19 PCR: NotDetec (22 @ 16:31)    COVID-19 García Domain AB Interp: Positive (21 @ 10:12)      RADIOLOGY:  imaging below personally reviewed    < from: CT Abdomen and Pelvis w/ IV Cont (22 @ 22:41) >  IMPRESSION:  Interval decrease in size of a fluid collection in the region of the   previously perforated appendicitis with slight increase in mild adjacent   inflammatory change for which a superimposed acute infection cannot be   excluded.  < end of copied text >   Patient is a 86y old  Female who presents with a chief complaint of rectal bleed (18 Mar 2022 10:02)    HPI:  86 F with PMHx of CVA with residual L sided weakness/plegia, and dysphagia / PEG, CAD s/p MI with preserved LVEF and moderate AS, HTN, gout, right breast CA s/p mastectomy in 2019 presents to the ED c/o BRB per rectum x3 days associated with nausea, and mild abdominal pain. Denies vomiting, fever, or recent abx use.     ID consulted to r/o infection. Patient well appearing with no fever/chills, no abdominal pain, no dysuria.        REVIEW OF SYSTEMS  [  ] ROS unobtainable because:    [ X ] All other systems negative except as noted below    Constitutional:  [ ] fever [ ] chills  [ ] weight loss  [ ]night sweat  [ ]poor appetite/PO intake [ ]fatigue   Skin:  [ ] rash [ ] phlebitis	  Eyes: [ ] icterus [ ] pain  [ ] discharge	  ENMT: [ ] sore throat  [ ] thrush [ ] ulcers [ ] exudates [ ]anosmia  Respiratory: [ ] dyspnea [ ] hemoptysis [ ] cough [ ] sputum	  Cardiovascular:  [ ] chest pain [ ] palpitations [ ] edema	  Gastrointestinal:  [ ] nausea [ ] vomiting [ ] diarrhea [ ] constipation [ ] pain	  Genitourinary:  [ ] dysuria [ ] frequency [ ] hematuria [ ] discharge [ ] flank pain  [ ] incontinence  Musculoskeletal:  [ ] myalgias [ ] arthralgias [ ] arthritis  [ ] back pain  Neurological:  [ ] headache [ ] weakness [ ] seizures  [ ] confusion/altered mental status    prior hospital charts reviewed [V]  primary team notes reviewed [V]  other consultant notes reviewed [V]    PAST MEDICAL & SURGICAL HISTORY:  MI (myocardial infarction)  HTN (hypertension)  Personal history of asbestosis  Gout  Dyslipidemia  UTI (lower urinary tract infection)  ETOH abuse  CVA (cerebral vascular accident)  History of left shoulder fracture  History of benign breast tumor      FAMILY HISTORY:  No pertinent family history in first degree relatives    SOCIAL HISTORY:  Denied smoking    Allergies  Toradol (Urticaria (Mild to Mod); Rash (Mild to Mod))        ANTIMICROBIALS:      ANTIMICROBIALS (past 90 days):   MEDICATIONS  (STANDING):  cefTRIAXone   IVPB   100 mL/Hr IV Intermittent (22 @ 12:09)   100 mL/Hr IV Intermittent (22 @ 12:26)    piperacillin/tazobactam IVPB...   200 mL/Hr IV Intermittent (22 @ 01:19)        MEDICATIONS  (STANDING):  acetylcysteine 20%  Inhalation 4 three times a day  albuterol/ipratropium for Nebulization 3 every 6 hours  allopurinol 100 daily  atorvastatin 20 at bedtime  guaiFENesin Oral Liquid (Sugar-Free) 300 every 6 hours  heparin   Injectable 5000 every 12 hours  levETIRAcetam  Solution 500 two times a day  levothyroxine 75 daily  sodium chloride 3%  Inhalation 4 every 12 hours      VITALS:  Vital Signs Last 24 Hrs  T(F): 98.1 (22 @ 10:06), Max: 98.9 (22 @ 19:38)  Vital Signs Last 24 Hrs  HR: 102 (22 @ 10:06) (93 - 107)  BP: 105/50 (22 @ 10:06) (103/63 - 134/63)  RR: 18 (22 @ 10:06)  SpO2: 96% (22 @ 10:06) (92% - 99%)  Wt(kg): --    PHYSICAL EXAM:  Constitutional: non-toxic, no distress  HEAD/EYES: anicteric, no conjunctival injection  ENT:  supple, no thrush  Cardiovascular:   normal S1/S2  Respiratory:  +BS bilaterally  GI:  +PEG, nontender, no guarding, normal bowel sounds  :  no flanagan, no CVA tenderness  Musculoskeletal:  contracted LUE/LLL extremities  Neurologic: awake and alert, L sided weakness  Skin:  no rash, no erythema, no phlebitis  Heme/Onc: no lymphadenopathy   Psychiatric:  awake, alert, appropriate mood      Labs:                        9.5    8.62  )-----------( 231      ( 18 Mar 2022 10:10 )             32.3     -    140  |  103  |  15  ----------------------------<  129<H>  4.1   |  24  |  0.77    Ca    9.0      18 Mar 2022 10:10    TPro  7.0  /  Alb  3.5  /  TBili  0.2  /  DBili  x   /  AST  22  /  ALT  15  /  AlkPhos  100  03-16      WBC Trend:  WBC Count: 8.62 (22 @ 10:10)  WBC Count: 6.50 (22 @ 19:09)  WBC Count: 6.44 (22 @ 19:57)    Auto Neutrophil #: 3.88 K/uL (22 @ 19:57)  Auto Neutrophil #: 4.10 K/uL (22 @ 00:51)  Auto Neutrophil #: 6.12 K/uL (21 @ 06:47)  Auto Neutrophil #: 8.90 K/uL (21 @ 07:18)  Auto Neutrophil #: 10.67 K/uL (21 @ 11:12)    Auto Eosinophil %: 0.9 % (22 @ 19:57)    Urinalysis Basic - ( 16 Mar 2022 20:31 )    Color: Light Yellow / Appearance: Clear / S.020 / pH: x  Gluc: x / Ketone: Negative  / Bili: Negative / Urobili: Negative   Blood: x / Protein: Negative / Nitrite: Negative   Leuk Esterase: Negative / RBC: 5 /hpf / WBC 1 /HPF   Sq Epi: x / Non Sq Epi: 5 /hpf / Bacteria: Negative      MICROBIOLOGY:  Culture - Urine (collected 16 Mar 2022 22:42)  Source: Clean Catch Clean Catch (Midstream)  Final Report:    <10,000 CFU/mL Normal Urogenital Ck    Culture - Blood (collected 2022 10:12)  Source: .Blood Blood-Peripheral  Final Report:    No Growth Final    Culture - Blood (collected 2022 10:12)  Source: .Blood Blood-Peripheral  Final Report:    No Growth Final    Culture - Urine (collected 2022 16:37)  Source: Clean Catch Clean Catch (Midstream)  Final Report:    >100,000 CFU/ml Enterococcus faecium (vancomycin resistant)    <10,000 CFU/ml Normal Urogenital ck present  Organism: Enterococcus faecium (vancomycin resistant)  Organism: Enterococcus faecium (vancomycin resistant)    Sensitivities:      -  Ampicillin: R >8 Predicts results to ampicillin/sulbactam, amoxacillin-clavulanate and  piperacillin-tazobactam.      -  Ciprofloxacin: R >2      -  Daptomycin: SDD 4 The breakpoint for SDD (sensitive dose dependent)is based on a dosage regimen of 8-12 mg/kg administered every 24 h in adults and is intended for serious infections due to E. faecium. Consultation with an infectious diseases specialist is recommended.      -  Levofloxacin: R >4      -  Linezolid: S 2      -  Nitrofurantoin: R >64 Should not be used to treat pyelonephritis.      -  Penicillin: R >8      -  Rifampin: R >2      -  Tetra/Doxy: S <=1      -  Vancomycin: R >16      Method Type: TAWANA    Culture - Blood (collected 2022 12:21)  Source: .Blood Blood-Peripheral  Final Report:    No Growth Final    Culture - Blood (collected 2022 12:21)  Source: .Blood Blood-Peripheral  Final Report:    No Growth Final    Culture - Fungal, Bronchial (collected 2022 18:48)  Source: BAL Bronchoalveolar Lavage  Final Report:    Rare Yeast    Culture - Bronchial (collected 2022 18:48)  Source: Bronch Wash Bronchoalveolar Lavage  Final Report:    Normal Respiratory Ck present    Culture - Acid Fast - Bronchial w/Smear (collected 2022 18:48)  Source: BAL Bronchoalveolar Lavage  Final Report:    No acid fast bacilli isolated after 6 weeks.    Culture - Blood (collected 24 Dec 2021 17:38)  Source: .Blood Blood-Venous  Final Report:    No Growth Final    COVID-19 PCR: NotDetec (22 @ 19:56)  COVID-19 PCR: NotDetec (22 @ 11:59)  COVID-19 PCR: NotDetec (22 @ 16:12)  COVID-19 PCR: NotDetec (22 @ 12:29)  COVID-19 PCR: NotDetec (22 @ 16:31)    COVID-19 García Domain AB Interp: Positive (21 @ 10:12)      RADIOLOGY:  imaging below personally reviewed    < from: CT Abdomen and Pelvis w/ IV Cont (22 @ 22:41) >  IMPRESSION:  Interval decrease in size of a fluid collection in the region of the   previously perforated appendicitis with slight increase in mild adjacent   inflammatory change for which a superimposed acute infection cannot be   excluded.  < end of copied text >

## 2022-03-18 NOTE — DIETITIAN INITIAL EVALUATION ADULT. - PERTINENT MEDS FT
MEDICATIONS  (STANDING):  acetylcysteine 20%  Inhalation 4 milliLiter(s) Inhalation three times a day  albuterol/ipratropium for Nebulization 3 milliLiter(s) Nebulizer every 6 hours  allopurinol 100 milliGRAM(s) Oral daily  atorvastatin 20 milliGRAM(s) Oral at bedtime  cyanocobalamin 1000 MICROGram(s) Oral daily  guaiFENesin Oral Liquid (Sugar-Free) 300 milliGRAM(s) Oral every 6 hours  heparin   Injectable 5000 Unit(s) SubCutaneous every 12 hours  levETIRAcetam  Solution 500 milliGRAM(s) Oral two times a day  levothyroxine 75 MICROGram(s) Oral daily  sodium chloride 3%  Inhalation 4 milliLiter(s) Inhalation every 12 hours    MEDICATIONS  (PRN):

## 2022-03-21 NOTE — DISCHARGE NOTE NURSING/CASE MANAGEMENT/SOCIAL WORK - PATIENT PORTAL LINK FT
You can access the FollowMyHealth Patient Portal offered by [facility] by registering at the following website: [email or web address]. By joining [employer]’s FollowMyHealth portal, you will also be able to view your health information using other applications (apps) compatible with our system.

## 2022-03-21 NOTE — DISCHARGE NOTE PROVIDER - NSDCCPCAREPLAN_GEN_ALL_CORE_FT
PRINCIPAL DISCHARGE DIAGNOSIS  Diagnosis: Hematochezia  Assessment and Plan of Treatment: Resolved      SECONDARY DISCHARGE DIAGNOSES  Diagnosis: RAQUEL (obstructive sleep apnea)  Assessment and Plan of Treatment: continue AVAP nocturnally    Diagnosis: CVA (cerebrovascular accident)  Assessment and Plan of Treatment:     Diagnosis: CAD (coronary artery disease)  Assessment and Plan of Treatment:     Diagnosis: HTN (hypertension)  Assessment and Plan of Treatment:

## 2022-03-21 NOTE — PHYSICAL THERAPY INITIAL EVALUATION ADULT - STRENGTHENING, PT EVAL
3. GOAL: In 3 weeks, pt will increase R UE/R LE strength by 1/2 muscle grade to improve all functional mobility.

## 2022-03-21 NOTE — DISCHARGE NOTE PROVIDER - CARE PROVIDER_API CALL
Heide Ayon  CARDIOVASCULAR DISEASE  [address], [address]  [place] [address]  Phone: [phone]  Fax: [phone]  Follow Up Time:

## 2022-03-21 NOTE — PROGRESS NOTE ADULT - SUBJECTIVE AND OBJECTIVE BOX
CARDIOLOGY     PROGRESS  NOTE   ________________________________________________    CHIEF COMPLAINT:Patient is a 86y old  Female who presents with a chief complaint of rectal bleed (18 Mar 2022 08:28)  no complain.  	  REVIEW OF SYSTEMS:  CONSTITUTIONAL: No fever, weight loss, or fatigue  EYES: No eye pain, visual disturbances, or discharge  ENT:  No difficulty hearing, tinnitus, vertigo; No sinus or throat pain  NECK: No pain or stiffness  RESPIRATORY: No cough, wheezing, chills or hemoptysis; No Shortness of Breath  CARDIOVASCULAR: No chest pain, palpitations, passing out, dizziness, or leg swelling  GASTROINTESTINAL: No abdominal or epigastric pain. No nausea, vomiting, or hematemesis; No diarrhea or constipation. No melena or hematochezia.  GENITOURINARY: No dysuria, frequency, hematuria, or incontinence  NEUROLOGICAL: No headaches, memory loss, loss of strength, numbness, or tremors  SKIN: No itching, burning, rashes, or lesions   LYMPH Nodes: No enlarged glands  ENDOCRINE: No heat or cold intolerance; No hair loss  MUSCULOSKELETAL: No joint pain or swelling; No muscle, back, or extremity pain  PSYCHIATRIC: No depression, anxiety, mood swings, or difficulty sleeping  HEME/LYMPH: No easy bruising, or bleeding gums  ALLERGY AND IMMUNOLOGIC: No hives or eczema	    [ ] All others negative	  [ ] Unable to obtain    PHYSICAL EXAM:  T(C): 37 (03-17-22 @ 22:35), Max: 37 (03-17-22 @ 22:35)  HR: 93 (03-18-22 @ 09:35) (93 - 107)  BP: 134/63 (03-17-22 @ 22:35) (103/63 - 134/63)  RR: 20 (03-17-22 @ 22:35) (18 - 20)  SpO2: 99% (03-18-22 @ 09:35) (92% - 99%)  Wt(kg): --  I&O's Summary    17 Mar 2022 07:01  -  18 Mar 2022 07:00  --------------------------------------------------------  IN: 0 mL / OUT: 500 mL / NET: -500 mL        Appearance: Normal	  HEENT:   Normal oral mucosa, PERRL, EOMI	  Lymphatic: No lymphadenopathy  Cardiovascular: Normal S1 S2, No JVD, + murmurs, No edema  Respiratory:rhonchi  Psychiatry: A & O x 3, Mood & affect appropriate  Gastrointestinal:  Soft, Non-tender, + BS	  Skin: No rashes, No ecchymoses, No cyanosis	  Neurologic: Non-focal  Extremities: Normal range of motion, No clubbing, cyanosis or edema  Vascular: Peripheral pulses palpable 2+ bilaterally    MEDICATIONS  (STANDING):  acetylcysteine 20%  Inhalation 4 milliLiter(s) Inhalation three times a day  albuterol/ipratropium for Nebulization 3 milliLiter(s) Nebulizer every 6 hours  allopurinol 100 milliGRAM(s) Oral daily  atorvastatin 20 milliGRAM(s) Oral at bedtime  cefTRIAXone   IVPB 1000 milliGRAM(s) IV Intermittent every 24 hours  cyanocobalamin 1000 MICROGram(s) Oral daily  guaiFENesin Oral Liquid (Sugar-Free) 300 milliGRAM(s) Oral every 6 hours  heparin   Injectable 5000 Unit(s) SubCutaneous every 12 hours  levETIRAcetam  Solution 500 milliGRAM(s) Oral two times a day  levothyroxine 75 MICROGram(s) Oral daily  sodium chloride 3%  Inhalation 4 milliLiter(s) Inhalation every 12 hours      TELEMETRY: 	    ECG:  	  RADIOLOGY:  OTHER: 	  	  LABS:	 	    CARDIAC MARKERS:                                9.4    6.50  )-----------( 228      ( 17 Mar 2022 19:09 )             31.4     03-16    137  |  98  |  26<H>  ----------------------------<  121<H>  4.9   |  24  |  0.70    Ca    10.3      16 Mar 2022 19:57    TPro  7.0  /  Alb  3.5  /  TBili  0.2  /  DBili  x   /  AST  22  /  ALT  15  /  AlkPhos  100  03-16    proBNP:   Lipid Profile:   HgA1c:   TSH:     < from: Xray Chest 1 View- PORTABLE-Routine (Xray Chest 1 View- PORTABLE-Routine .) (03.10.22 @ 09:39) >  Patchy airspace disease at the left lung base, likely atelectasis.   Underlying infection is not totally excluded.  Possible small left effusion        Assessment and plan  ---------------------------  86 with PMHx of CVA with residual L sided weakness/plegia, and dysphagia / PEG, CAD s/p MI with preserved LVEF and moderate AS, HTN, gout, right breast CA s/p mastectomy in 2019   presents to the ED c/o BRB per rectum x3 days associated with nausea, and mild abdominal pain.  Denies vomiting, fever, or recent abx use.  pt is well known to me with hx of htn, tracheomalacia with multiple admission with l lung collapse, htn, cad, chf diastolic dysfunction with perforated appendicitis.  ct scan noted  continue iv abx  surgery eval noted  dvt prophylaxis  hold diuretics  incentive spirometry  oob to chair    	        
           CARDIOLOGY     PROGRESS  NOTE   ________________________________________________    CHIEF COMPLAINT:Patient is a 86y old  Female who presents with a chief complaint of rectal bleed (21 Mar 2022 07:42)  no complain.  	  REVIEW OF SYSTEMS:  CONSTITUTIONAL: No fever, weight loss, or fatigue  EYES: No eye pain, visual disturbances, or discharge  ENT:  No difficulty hearing, tinnitus, vertigo; No sinus or throat pain  NECK: No pain or stiffness  RESPIRATORY: No cough, wheezing, chills or hemoptysis; No Shortness of Breath  CARDIOVASCULAR: No chest pain, palpitations, passing out, dizziness, or leg swelling  GASTROINTESTINAL: No abdominal or epigastric pain. No nausea, vomiting, or hematemesis; No diarrhea or constipation. No melena or hematochezia.  GENITOURINARY: No dysuria, frequency, hematuria, or incontinence  NEUROLOGICAL: No headaches, memory loss, loss of strength, numbness, or tremors  SKIN: No itching, burning, rashes, or lesions   LYMPH Nodes: No enlarged glands  ENDOCRINE: No heat or cold intolerance; No hair loss  MUSCULOSKELETAL: No joint pain or swelling; No muscle, back, or extremity pain  PSYCHIATRIC: No depression, anxiety, mood swings, or difficulty sleeping  HEME/LYMPH: No easy bruising, or bleeding gums  ALLERGY AND IMMUNOLOGIC: No hives or eczema	    [ ] All others negative	  [ ] Unable to obtain    PHYSICAL EXAM:  T(C): 37 (03-21-22 @ 09:12), Max: 37 (03-21-22 @ 09:12)  HR: 81 (03-21-22 @ 09:12) (81 - 89)  BP: 139/57 (03-21-22 @ 09:12) (103/53 - 147/45)  RR: 18 (03-21-22 @ 09:12) (18 - 19)  SpO2: 98% (03-21-22 @ 09:12) (95% - 98%)  Wt(kg): --  I&O's Summary    20 Mar 2022 07:01  -  21 Mar 2022 07:00  --------------------------------------------------------  IN: 180 mL / OUT: 250 mL / NET: -70 mL        Appearance: Normal	  HEENT:   Normal oral mucosa, PERRL, EOMI	  Lymphatic: No lymphadenopathy  Cardiovascular: Normal S1 S2, No JVD, + murmurs, No edema  Respiratory: Lungs clear to auscultation	  Psychiatry: A & O x 3, Mood & affect appropriate  Gastrointestinal:  Soft, Non-tender, + BS	  Skin: No rashes, No ecchymoses, No cyanosis	  Neurologic: Non-focal  Extremities: Normal range of motion, No clubbing, cyanosis or edema  Vascular: Peripheral pulses palpable 2+ bilaterally    MEDICATIONS  (STANDING):  acetylcysteine 20%  Inhalation 4 milliLiter(s) Inhalation three times a day  albuterol/ipratropium for Nebulization 3 milliLiter(s) Nebulizer every 6 hours  allopurinol 100 milliGRAM(s) Oral daily  atorvastatin 20 milliGRAM(s) Oral at bedtime  chlorhexidine 2% Cloths 1 Application(s) Topical daily  cyanocobalamin 1000 MICROGram(s) Oral daily  diltiazem    Tablet 30 milliGRAM(s) Oral three times a day  heparin   Injectable 5000 Unit(s) SubCutaneous every 12 hours  levETIRAcetam  Solution 500 milliGRAM(s) Oral two times a day  levothyroxine 75 MICROGram(s) Oral daily  sodium chloride 3%  Inhalation 4 milliLiter(s) Inhalation every 12 hours      TELEMETRY: 	    ECG:  	  RADIOLOGY:  OTHER: 	  	  LABS:	 	    CARDIAC MARKERS:                  proBNP:   Lipid Profile:   HgA1c:   TSH:         Assessment and plan  ---------------------------  86 with PMHx of CVA with residual L sided weakness/plegia, and dysphagia / PEG, CAD s/p MI with preserved LVEF and moderate AS, HTN, gout, right breast CA s/p mastectomy in 2019   presents to the ED c/o BRB per rectum x3 days associated with nausea, and mild abdominal pain.  Denies vomiting, fever, or recent abx use.  pt is well known to me with hx of htn, tracheomalacia with multiple admission with l lung collapse, htn, cad, chf diastolic dysfunction with perforated appendicitis.  ct scan noted  surgery eval noted  dvt prophylaxis  hold diuretics  incentive spirometry  oob to chair  continue cardizem    	        
  afberile    REVIEW OF SYSTEMS:  GEN: no fever,    no chills  RESP: no SOB,   no cough  CVS: no chest pain,   no palpitations  GI: no abdominal pain,   no nausea,   no vomiting,   no constipation,   no diarrhea  : no dysuria,   no frequency  NEURO: no headache,   no dizziness  PSYCH: no depression,   not anxious  Derm : no rash    MEDICATIONS  (STANDING):  acetylcysteine 20%  Inhalation 4 milliLiter(s) Inhalation three times a day  albuterol/ipratropium for Nebulization 3 milliLiter(s) Nebulizer every 6 hours  allopurinol 100 milliGRAM(s) Oral daily  atorvastatin 20 milliGRAM(s) Oral at bedtime  cefTRIAXone   IVPB 1000 milliGRAM(s) IV Intermittent every 24 hours  cyanocobalamin 1000 MICROGram(s) Oral daily  guaiFENesin Oral Liquid (Sugar-Free) 300 milliGRAM(s) Oral every 6 hours  heparin   Injectable 5000 Unit(s) SubCutaneous every 12 hours  levETIRAcetam  Solution 500 milliGRAM(s) Oral two times a day  levothyroxine 75 MICROGram(s) Oral daily  sodium chloride 3%  Inhalation 4 milliLiter(s) Inhalation every 12 hours    MEDICATIONS  (PRN):      Vital Signs Last 24 Hrs  T(C): 37 (17 Mar 2022 22:35), Max: 37 (17 Mar 2022 22:35)  T(F): 98.6 (17 Mar 2022 22:35), Max: 98.6 (17 Mar 2022 22:35)  HR: 102 (18 Mar 2022 03:50) (95 - 107)  BP: 134/63 (17 Mar 2022 22:35) (103/63 - 134/63)  BP(mean): --  RR: 20 (17 Mar 2022 22:35) (18 - 20)  SpO2: 98% (18 Mar 2022 03:50) (92% - 98%)  CAPILLARY BLOOD GLUCOSE        I&O's Summary    17 Mar 2022 07:01  -  18 Mar 2022 07:00  --------------------------------------------------------  IN: 0 mL / OUT: 500 mL / NET: -500 mL        PHYSICAL EXAM:  HEAD:  Atraumatic, Normocephalic  NECK: Supple, No   JVD  CHEST/LUNG:   no     rales,     no,    rhonchi  HEART: Regular rate and rhythm;         murmur  ABDOMEN: Soft, Nontender, ;   EXTREMITIES:    no    edema  NEUROLOGY:  alert    LABS:                        9.4    6.50  )-----------( 228      ( 17 Mar 2022 19:09 )             31.4     03-    137  |  98  |  26<H>  ----------------------------<  121<H>  4.9   |  24  |  0.70    Ca    10.3      16 Mar 2022 19:57    TPro  7.0  /  Alb  3.5  /  TBili  0.2  /  DBili  x   /  AST  22  /  ALT  15  /  AlkPhos  100  -          Urinalysis Basic - ( 16 Mar 2022 20:31 )    Color: Light Yellow / Appearance: Clear / S.020 / pH: x  Gluc: x / Ketone: Negative  / Bili: Negative / Urobili: Negative   Blood: x / Protein: Negative / Nitrite: Negative   Leuk Esterase: Negative / RBC: 5 /hpf / WBC 1 /HPF   Sq Epi: x / Non Sq Epi: 5 /hpf / Bacteria: Negative           @ 19:57  4.7  54      Thyroid Stimulating Hormone, Serum: 2.39 uIU/mL ( @ 09:13)          Consultant(s) Notes Reviewed:      Care Discussed with Consultants/Other Providers:    
           CARDIOLOGY     PROGRESS  NOTE   ________________________________________________    CHIEF COMPLAINT:Patient is a 86y old  Female who presents with a chief complaint of rectal bleed (20 Mar 2022 09:04)  no complain.  	  REVIEW OF SYSTEMS:  CONSTITUTIONAL: No fever, weight loss, or fatigue  EYES: No eye pain, visual disturbances, or discharge  ENT:  No difficulty hearing, tinnitus, vertigo; No sinus or throat pain  NECK: No pain or stiffness  RESPIRATORY: No cough, wheezing, chills or hemoptysis; No Shortness of Breath  CARDIOVASCULAR: No chest pain, palpitations, passing out, dizziness, or leg swelling  GASTROINTESTINAL: No abdominal or epigastric pain. No nausea, vomiting, or hematemesis; No diarrhea or constipation. No melena or hematochezia.  GENITOURINARY: No dysuria, frequency, hematuria, or incontinence  NEUROLOGICAL: No headaches, memory loss, loss of strength, numbness, or tremors  SKIN: No itching, burning, rashes, or lesions   LYMPH Nodes: No enlarged glands  ENDOCRINE: No heat or cold intolerance; No hair loss  MUSCULOSKELETAL: No joint pain or swelling; No muscle, back, or extremity pain  PSYCHIATRIC: No depression, anxiety, mood swings, or difficulty sleeping  HEME/LYMPH: No easy bruising, or bleeding gums  ALLERGY AND IMMUNOLOGIC: No hives or eczema	    [ ] All others negative	  [ ] Unable to obtain    PHYSICAL EXAM:  T(C): 36.5 (03-20-22 @ 08:17), Max: 37.1 (03-19-22 @ 23:27)  HR: 81 (03-20-22 @ 08:17) (81 - 92)  BP: 108/53 (03-20-22 @ 08:17) (99/57 - 120/57)  RR: 20 (03-20-22 @ 08:17) (18 - 20)  SpO2: 96% (03-20-22 @ 08:17) (93% - 98%)  Wt(kg): --  I&O's Summary    19 Mar 2022 07:01  -  20 Mar 2022 07:00  --------------------------------------------------------  IN: 0 mL / OUT: 450 mL / NET: -450 mL        Appearance: Normal	  HEENT:   Normal oral mucosa, PERRL, EOMI	  Lymphatic: No lymphadenopathy  Cardiovascular: Normal S1 S2, No JVD, +murmurs, No edema  Respiratory: Lungs clear to auscultation	  Psychiatry: A & O x 3, Mood & affect appropriate  Gastrointestinal:  Soft, Non-tender, + BS	  Skin: No rashes, No ecchymoses, No cyanosis	  Neurologic: Non-focal  Extremities: Normal range of motion, No clubbing, cyanosis or edema  Vascular: Peripheral pulses palpable 2+ bilaterally    MEDICATIONS  (STANDING):  acetylcysteine 20%  Inhalation 4 milliLiter(s) Inhalation three times a day  albuterol/ipratropium for Nebulization 3 milliLiter(s) Nebulizer every 6 hours  allopurinol 100 milliGRAM(s) Oral daily  atorvastatin 20 milliGRAM(s) Oral at bedtime  cyanocobalamin 1000 MICROGram(s) Oral daily  diltiazem    Tablet 30 milliGRAM(s) Oral three times a day  heparin   Injectable 5000 Unit(s) SubCutaneous every 12 hours  levETIRAcetam  Solution 500 milliGRAM(s) Oral two times a day  levothyroxine 75 MICROGram(s) Oral daily  sodium chloride 3%  Inhalation 4 milliLiter(s) Inhalation every 12 hours      TELEMETRY: 	    ECG:  	  RADIOLOGY:  OTHER: 	  	  LABS:	 	    CARDIAC MARKERS:                                9.5    8.62  )-----------( 231      ( 18 Mar 2022 10:10 )             32.3     03-18    140  |  103  |  15  ----------------------------<  129<H>  4.1   |  24  |  0.77    Ca    9.0      18 Mar 2022 10:10      proBNP:   Lipid Profile:   HgA1c:   TSH:   Impression:  #H/O Perforated Appendicitis, Abscess - s/p 6 week course of antibiotics completed 02/03/2022. No evidence of active infection at this time. CT findings overall improved  #H/O ?Endocarditis - prior staph epi bacteremia noted on several blood cultures in 12/2021. TTE at that time with possible vegetation on AV. S/p 6 week course of vanc    Recs:  - d/c ceftriaxone and monitor off antibiotics, no evidence of active infection      Assessment and plan  ---------------------------  86 with PMHx of CVA with residual L sided weakness/plegia, and dysphagia / PEG, CAD s/p MI with preserved LVEF and moderate AS, HTN, gout, right breast CA s/p mastectomy in 2019   presents to the ED c/o BRB per rectum x3 days associated with nausea, and mild abdominal pain.  Denies vomiting, fever, or recent abx use.  pt is well known to me with hx of htn, tracheomalacia with multiple admission with l lung collapse, htn, cad, chf diastolic dysfunction with perforated appendicitis.  ct scan noted  surgery eval noted  dvt prophylaxis  hold diuretics  incentive spirometry  oob to chair  continue cardizem      	        
  Chief Complaint:  Patient is a 86y old  Female who presents with a chief complaint of rectal bleed (18 Mar 2022 12:46)      Interval Events: Patient looks well today. She has not had a BM today. Denies abd pain, n/v. She tolerating tube feeds.    Allergies:  Toradol (Urticaria (Mild to Mod); Rash (Mild to Mod))        Hospital Medications:  acetaminophen    Suspension .. 650 milliGRAM(s) Oral once  acetylcysteine 20%  Inhalation 4 milliLiter(s) Inhalation three times a day  albuterol/ipratropium for Nebulization 3 milliLiter(s) Nebulizer every 6 hours  allopurinol 100 milliGRAM(s) Oral daily  atorvastatin 20 milliGRAM(s) Oral at bedtime  cefTRIAXone   IVPB 1000 milliGRAM(s) IV Intermittent every 24 hours  cyanocobalamin 1000 MICROGram(s) Oral daily  guaiFENesin Oral Liquid (Sugar-Free) 300 milliGRAM(s) Oral every 6 hours  heparin   Injectable 5000 Unit(s) SubCutaneous every 12 hours  levETIRAcetam  Solution 500 milliGRAM(s) Oral two times a day  levothyroxine 75 MICROGram(s) Oral daily  sodium chloride 3%  Inhalation 4 milliLiter(s) Inhalation every 12 hours      PMHX/PSHX:  MI (myocardial infarction)    HTN (hypertension)    Personal history of asbestosis    Gout    Dyslipidemia    UTI (lower urinary tract infection)    ETOH abuse    CVA (cerebral vascular accident)    History of left shoulder fracture    History of benign breast tumor        Family history:  No pertinent family history in first degree relatives      PHYSICAL EXAM:   Vital Signs:  Vital Signs Last 24 Hrs  T(C): 36.7 (18 Mar 2022 10:06), Max: 37 (17 Mar 2022 22:35)  T(F): 98.1 (18 Mar 2022 10:06), Max: 98.6 (17 Mar 2022 22:35)  HR: 102 (18 Mar 2022 10:06) (93 - 107)  BP: 105/50 (18 Mar 2022 10:06) (103/63 - 134/63)  BP(mean): --  RR: 18 (18 Mar 2022 10:06) (18 - 20)  SpO2: 96% (18 Mar 2022 10:06) (92% - 99%)  Daily     Daily     GENERAL:  Appears stated age, well-groomed, no distress   HEENT:  NC/AT,  conjunctivae clear and pink, sclera -anicteric  CHEST:  Full & symmetric excursion, no increased effort, rales b/l with better aeration than yesterday  HEART:  Regular rhythm, S1, S2, no murmur/rub/S3/S4, no abdominal bruit, no edema  ABDOMEN:  Soft, non-tender, non-distended, normoactive bowel sounds,  no masses, PEG in place, nonerythematous   EXTEREMITIES: no cyanosis, clubbing, +UE edema   SKIN:  No rash/erythema/ecchymoses/petechiae/wounds/abscess/warm/dry  NEURO:  Alert, oriented, no asterixis, no tremor, no encephalopathy    LABS:                        9.5    8.62  )-----------( 231      ( 18 Mar 2022 10:10 )             32.3     Mean Cell Volume: 112.9 fl (22 @ 10:10)        140  |  103  |  15  ----------------------------<  129<H>  4.1   |  24  |  0.77    Ca    9.0      18 Mar 2022 10:10    TPro  7.0  /  Alb  3.5  /  TBili  0.2  /  DBili  x   /  AST  22  /  ALT  15  /  AlkPhos  100  03-16    LIVER FUNCTIONS - ( 16 Mar 2022 19:57 )  Alb: 3.5 g/dL / Pro: 7.0 g/dL / ALK PHOS: 100 U/L / ALT: 15 U/L / AST: 22 U/L / GGT: x             Urinalysis Basic - ( 16 Mar 2022 20:31 )    Color: Light Yellow / Appearance: Clear / S.020 / pH: x  Gluc: x / Ketone: Negative  / Bili: Negative / Urobili: Negative   Blood: x / Protein: Negative / Nitrite: Negative   Leuk Esterase: Negative / RBC: 5 /hpf / WBC 1 /HPF   Sq Epi: x / Non Sq Epi: 5 /hpf / Bacteria: Negative                              9.5    8.62  )-----------( 231      ( 18 Mar 2022 10:10 )             32.3                         9.4    6.50  )-----------( 228      ( 17 Mar 2022 19:09 )             31.4                         9.7    6.44  )-----------( 222      ( 16 Mar 2022 19:57 )             31.9 
           CARDIOLOGY     PROGRESS  NOTE   ________________________________________________    CHIEF COMPLAINT:Patient is a 86y old  Female who presents with a chief complaint of rectal bleed (19 Mar 2022 08:38)  doing better.  	  REVIEW OF SYSTEMS:  CONSTITUTIONAL: No fever, weight loss, or fatigue  EYES: No eye pain, visual disturbances, or discharge  ENT:  No difficulty hearing, tinnitus, vertigo; No sinus or throat pain  NECK: No pain or stiffness  RESPIRATORY: No cough, wheezing, chills or hemoptysis; No Shortness of Breath  CARDIOVASCULAR: No chest pain, palpitations, passing out, dizziness, or leg swelling  GASTROINTESTINAL: No abdominal or epigastric pain. No nausea, vomiting, or hematemesis; No diarrhea or constipation. No melena or hematochezia.  GENITOURINARY: No dysuria, frequency, hematuria, or incontinence  NEUROLOGICAL: No headaches, memory loss, loss of strength, numbness, or tremors  SKIN: No itching, burning, rashes, or lesions   LYMPH Nodes: No enlarged glands  ENDOCRINE: No heat or cold intolerance; No hair loss  MUSCULOSKELETAL: No joint pain or swelling; No muscle, back, or extremity pain  PSYCHIATRIC: No depression, anxiety, mood swings, or difficulty sleeping  HEME/LYMPH: No easy bruising, or bleeding gums  ALLERGY AND IMMUNOLOGIC: No hives or eczema	    [ ] All others negative	  [ ] Unable to obtain    PHYSICAL EXAM:  T(C): 36.9 (03-18-22 @ 23:35), Max: 36.9 (03-18-22 @ 23:35)  HR: 89 (03-19-22 @ 10:02) (89 - 104)  BP: 122/94 (03-18-22 @ 23:35) (100/47 - 122/94)  RR: 20 (03-18-22 @ 23:35) (18 - 20)  SpO2: 98% (03-19-22 @ 10:02) (95% - 100%)  Wt(kg): --  I&O's Summary    18 Mar 2022 07:01  -  19 Mar 2022 07:00  --------------------------------------------------------  IN: 250 mL / OUT: 0 mL / NET: 250 mL        Appearance: Normal	  HEENT:   Normal oral mucosa, PERRL, EOMI	  Lymphatic: No lymphadenopathy  Cardiovascular: Normal S1 S2, No JVD, + murmurs, No edema  Respiratory: rhonchi	  Psychiatry: A & O x 3, Mood & affect appropriate  Gastrointestinal:  Soft, Non-tender, + BS	  Skin: No rashes, No ecchymoses, No cyanosis	  Neurologic: Non-focal  Extremities: Normal range of motion, No clubbing, cyanosis or edema  Vascular: Peripheral pulses palpable 2+ bilaterally    MEDICATIONS  (STANDING):  acetylcysteine 20%  Inhalation 4 milliLiter(s) Inhalation three times a day  albuterol/ipratropium for Nebulization 3 milliLiter(s) Nebulizer every 6 hours  allopurinol 100 milliGRAM(s) Oral daily  atorvastatin 20 milliGRAM(s) Oral at bedtime  cyanocobalamin 1000 MICROGram(s) Oral daily  diltiazem    Tablet 30 milliGRAM(s) Oral three times a day  guaiFENesin Oral Liquid (Sugar-Free) 300 milliGRAM(s) Oral every 6 hours  heparin   Injectable 5000 Unit(s) SubCutaneous every 12 hours  levETIRAcetam  Solution 500 milliGRAM(s) Oral two times a day  levothyroxine 75 MICROGram(s) Oral daily  sodium chloride 3%  Inhalation 4 milliLiter(s) Inhalation every 12 hours      TELEMETRY: 	    ECG:  	  RADIOLOGY:  OTHER: 	  	  LABS:	 	    CARDIAC MARKERS:                                9.5    8.62  )-----------( 231      ( 18 Mar 2022 10:10 )             32.3     03-18    140  |  103  |  15  ----------------------------<  129<H>  4.1   |  24  |  0.77    Ca    9.0      18 Mar 2022 10:10      proBNP:   Lipid Profile:   HgA1c:   TSH:         Assessment and plan  ---------------------------  86 with PMHx of CVA with residual L sided weakness/plegia, and dysphagia / PEG, CAD s/p MI with preserved LVEF and moderate AS, HTN, gout, right breast CA s/p mastectomy in 2019   presents to the ED c/o BRB per rectum x3 days associated with nausea, and mild abdominal pain.  Denies vomiting, fever, or recent abx use.  pt is well known to me with hx of htn, tracheomalacia with multiple admission with l lung collapse, htn, cad, chf diastolic dysfunction with perforated appendicitis.  ct scan noted  surgery eval noted  dvt prophylaxis  hold diuretics  incentive spirometry  oob to chair  continue cardizem      	        
[facility] - PROGRESS NOTE    CHIEF COMPLAINT: chronic hypoxic respiratory failure; mucus plugging; atelectasis; weak cough; dysphagia; tracheobronchomalacia; PEG in place; h/o CVA with left sided plegia; "hematochezia"    INTERVAL HISTORY: no bowel movements or hematochezia since arriving [facility] the [facility]; no nausea, vomiting, abdominal pain, diarrhea or constipation; did not tolerate BIPAP overnight apparently she has not been using it at rehab); awake and alert; no shortness of breath or hypoxemia on a nasal canula @ 3lpm; no cough, sputum production, hemoptysis, chest congestion or wheeze; no fevers, chills or sweats; no chest pain/pressure or palpitations;      REVIEW OF SYSTEMS:  Constitutional: As per interval history  HEENT: Within normal limits  CV: As per interval history  Resp: As per interval history  GI: dysphagia -> PEG - hematochezia  : Within normal limits  Musculoskeletal: Within normal limits  Skin: Within normal limits  Neurological: CVA - > left sided weakness/plegia  Psychiatric: Within normal limits  Endocrine: Within normal limits  Hematologic/Lymphatic: Within normal limits  Allergic/Immunologic: Within normal limits    MEDICATIONS:     Pulmonary "  acetylcysteine 20%  Inhalation 4 milliLiter(s) Inhalation three times a day  albuterol/ipratropium for Nebulization 3 milliLiter(s) Nebulizer every 6 hours  guaiFENesin Oral Liquid (Sugar-Free) 300 milliGRAM(s) Oral every 6 hours  sodium chloride 3%  Inhalation 4 milliLiter(s) Inhalation every 12 hours    Anti-microbials:    Cardiovascular:    Other:  allopurinol 100 milliGRAM(s) Oral daily  atorvastatin 20 milliGRAM(s) Oral at bedtime  cyanocobalamin 1000 MICROGram(s) Oral daily  heparin   Injectable 5000 Unit(s) SubCutaneous every 12 hours  levETIRAcetam  Solution 500 milliGRAM(s) Oral two times a day  levothyroxine 75 MICROGram(s) Oral daily    OBJECTIVE:    PHYSICAL EXAM:       ICU Vital Signs Last 24 Hrs  T(C): 36.7 (18 Mar 2022 10:06), Max: 37 (17 Mar 2022 22:35)  T(F): 98.1 (18 Mar 2022 10:06), Max: 98.6 (17 Mar 2022 22:35)  HR: 102 (18 Mar 2022 10:06) (93 - 107)  BP: 105/50 (18 Mar 2022 10:06) (103/63 - 134/63)  BP(mean): --  ABP: --  ABP(mean): --  RR: 18 (18 Mar 2022 10:06) (18 - 20)  SpO2: 96% (18 Mar 2022 10:06) (92% - 99%) on 3lpm nasal canula     General: Awake and alert. Cooperative. No distress Appears stated age. Bedbound  HEENT: Atraumatic. Normocephalic. Anicteric. Normal oral mucosa. PERRL. EOMI. Mallampati class IV airway.  Neck: Supple. Trachea midline. Thyroid without enlargement/tenderness/nodules. No carotid bruit. No JVD. Short and wide  Cardiovascular: Regular rate and rhythm. S1 S2 normal. III/VI systolic murmur  Respiratory: Respirations unlabored. Bibasilar rales. Bilateral equal breath sounds. No wheeze. No curvature.  Abdomen: Soft. Non-tender. Non-distended. No organomegaly. No masses. Normal bowel sounds. Obese. PEG.  Extremities: Warm to touch. No clubbing or cyanosis. No pedal edema. Large legs  Pulses: 2+ peripheral pulses all extremities.	  Skin: Normal skin color. No rashes or lesions. No ecchymoses. No cyanosis. Warm to touch.  Lymph Nodes: Cervical, supraclavicular and axillary nodes normal  Neurological: Left lower extremity plegia with contraction. Left upper extremity is quite weak. A and O x 3  Psychiatry: Appropriate mood and affect.    LABS:                          9.5    8.62  )-----------( 231      ( 18 Mar 2022 10:10 )             32.3     CBC    WBC  8.62 <==, 6.50 <==, 6.44 <==    Hemoglobin  9.5 <<==, 9.4 <<==, 9.7 <<==    Hematocrit  32.3 <==, 31.4 <==, 31.9 <==    Platelets  231 <==, 228 <==, 222 <==      140  |  103  |  15  ----------------------------<  129<H>    03-18  4.1   |  24  |  0.77      LYTES    sodium  140 <==, 137 <==    potassium   4.1 <==, 4.9 <==    chloride  103 <==, 98 <==    carbon dioxide  24 <==, 24 <==    =============================================================================================  RENAL FUNCTION:    Creatinine:   0.77  <<==, 0.70  <<==    BUN:   15 <==, 26 <==    ============================================================================================    calcium   9.0 <==, 10.3 <==    ============================================================================================  LFTs    AST:   22 <==     ALT:  15  <==     AP:  100  <=    Bili:  0.2  <=    Venous Blood Gas:   @ 21:50  --/--/--/--/--  VBG Lactate: 2.6    Venous Blood Gas:   @ 19:57  7.39/44/54/27/83.9  VBG Lactate: 3.1    < from: TTE with Doppler (w/Cont) (22 @ 14:08) >    Patient name: FRANKI MEHTA  YOB: 1935   Age: 86 (F)   MR#: [id number]  Study Date: 2022  [facility]  Study quality: difficult due to patient being  Referring Physician: Melinda Segundo MD  Blood Pressure: 109/63 mmHg  Height: 163 cm  Weight: 91 kg  BSA: 2 m2  ------------------------------------------------------------------------  PROCEDURE: Transthoracic echocardiogram with 2-D, M-Mode  and complete spectral and color flow Doppler. Verbal  consent was obtained for injection of  Ultrasonic Enhancing  Agent following a discussion of risks and benefits.  Following intravenous injection of Ultrasonic Enhancing  Agent , harmonic imaging was performed.  INDICATION: Endocarditis, valve unspecified (I38)  ------------------------------------------------------------------------  Dimensions:    Normal Values:  LA:     2.5    2.0 - 4.0 cm  Ao:     3.3    2.0 - 3.8 cm  SEPTUM: 1.6    0.6 - 1.2 cm  PWT:    0.9    0.6 - 1.1 cm  LVIDd:  3.6    3.0 - 5.6 cm  LVIDs:  2.5    1.8 - 4.0 cm  Derived variables:  LVMI: 77 g/m2  RWT: 0.50  Fractional short: 31 %  EF (Visual Estimate): 70-75 %  ------------------------------------------------------------------------  Conclusions:  1. Concentric left ventricular hypertrophy.  2. Hyperdynamic left ventricular systolic function.  Endocardial visualization enhanced with intravenous  injection of Ultrasonic Enhancing Agent (Definity).  3. The right ventricle is not well visualized; grossly  normal right ventricular systolic function.  Pt refused to proceed with exam.  Limited Doppler study.  No trans aortic gradients.  Unable to rule out endocarditis.  ------------------------------------------------------------------------  Confirmed on 2022 - 15:42:57 by COMPA Castillo  ------------------------------------------------------------------------  ---------------------------------------------------------------------------------------------------------------    MICROBIOLOGY:     COVID-19 PCR (22 @ 19:56)   COVID-19 PCR: NotDetec:     Urinalysis Basic - ( 16 Mar 2022 20:31 )    Color: Light Yellow / Appearance: Clear / S.020 / pH: x  Gluc: x / Ketone: Negative  / Bili: Negative / Urobili: Negative   Blood: x / Protein: Negative / Nitrite: Negative   Leuk Esterase: Negative / RBC: 5 /hpf / WBC 1 /HPF   Sq Epi: x / Non Sq Epi: 5 /hpf / Bacteria: Negative    Culture - Urine (22 @ 22:42)   Specimen Source: Clean Catch Clean Catch (Midstream)   Culture Results:   <10,000 CFU/mL Normal Urogenital Regina     RADIOLOGY:  [x ] Chest radiographs reviewed and interpreted by me    EXAM:  CT ABDOMEN AND PELVIS IC                          PROCEDURE DATE:  2022      INTERPRETATION:  CLINICAL INFORMATION: Abdominal pain and diarrhea.    COMPARISON: CT of the abdomen and pelvis from 2022.      FINDINGS:  LOWER CHEST: Bibasilar subsegmental atelectasis. Coronary artery   calcification.    LIVER: Within normal limits.  BILE DUCTS: Normal caliber.  GALLBLADDER: Cholelithiasis.  SPLEEN: Within normal limits.  PANCREAS: Within normal limits.  ADRENALS: Within normal limits.  KIDNEYS/URETERS: No hydronephrosis. Bilateral renal atrophy.    BLADDER: Within normal limits.  REPRODUCTIVE ORGANS: Ovaries within normal limits. Atrophic uterus.    BOWEL: Gastrostomy tube with retention balloon in the body of the   stomach. No bowel obstruction. Scattered sigmoid diverticulosis without   evidence of diverticulitis. Interval decrease in size of a fluid   collection in the region of the previously perforated appendicitis   measuring 2.8 x 1.5 cm previously measuring 4.0 x 2.2 cm. Slight increase   in mild adjacent inflammatory change.  PERITONEUM: No ascites. No pneumoperitoneum.  VESSELS: Within normal limits.  RETROPERITONEUM/LYMPH NODES: No lymphadenopathy.  ABDOMINAL WALL: Subcutaneous injection granulomas. Marked diffuse muscle   atrophy. Diastases of the abdominis rectus muscles. Small fat-containing   umbilical hernia.  BONES: Degenerative changes of the spine. T11 and L1 vertebral body   compression fracture deformities, not significantly changed.    IMPRESSION:  Interval decrease in size of a fluid collection in the region of the   previously perforated appendicitis with slight increase in mild adjacent   inflammatory change for which a superimposed acute infection cannot be   excluded.    RAYMON GUTIÉRREZ MD; Resident Radiologist  This document has been electronically signed.  GRACE MESA MD; Attending Radiologist  This document has been electronically signed. Mar 17 2022 12:37AM  ---------------------------------------------------------------------------------------------------------------  EXAM:  XR CHEST PORTABLE ROUTINE 1V                          PROCEDURE DATE:  03/10/2022      INTERPRETATION:  TECHNIQUE: A single AP view of the chest was obtained.   Ordered time:   3/10/2022 9:39 AM    COMPARISON: 2022    CLINICAL INFORMATION: Acute respiratory failure    FINDINGS:  Patient status post internal fixation of the left shoulder.  The heart is not well assessed on an AP film.  The right lung is clear. There is patchy airspace disease at the left   lung base, likely atelectasis.  There may be a small left pleural effusion.  There is no pneumothorax.    IMPRESSION:    Patchy airspace disease at the left lung base, likely atelectasis.   Underlying infection is not totally excluded.  Possible small left effusion    DIANE CLARK MD; Attending Radiologist  This document has been electronically signed. Mar 11 2022  4:57PM  ---------------------------------------------------------------------------------------------------------------              
[facility] - PROGRESS NOTE    CHIEF COMPLAINT: chronic hypoxic respiratory failure; mucus plugging; atelectasis; weak cough; dysphagia; tracheobronchomalacia; PEG in place; h/o CVA with left sided plegia; "hematochezia"    INTERVAL HISTORY: no further "hematochezia"; no nausea, vomiting, abdominal pain, diarrhea or constipation; not wearing nocturnal BIPAP; awake and alert; stuttering speech; no shortness of breath or hypoxemia on a nasal canula @ 3lpm; no cough, sputum production, hemoptysis, chest congestion or wheeze; no fevers, chills or sweats; no chest pain/pressure or palpitations; bedbound;    REVIEW OF SYSTEMS:  Constitutional: As per interval history  HEENT: Within normal limits  CV: As per interval history  Resp: As per interval history  GI: dysphagia -> PEG   : Within normal limits  Musculoskeletal: Within normal limits  Skin: Within normal limits  Neurological: CVA - > left sided weakness/plegia  Psychiatric: Within normal limits  Endocrine: Within normal limits  Hematologic/Lymphatic: Within normal limits  Allergic/Immunologic: Within normal limits    MEDICATIONS:     Pulmonary "  albuterol/ipratropium for Nebulization 3 milliLiter(s) Nebulizer every 6 hours  sodium chloride 3%  Inhalation 4 milliLiter(s) Inhalation every 12 hours    Anti-microbials:    Cardiovascular:  diltiazem    Tablet 30 milliGRAM(s) Oral three times a day    Other:  allopurinol 100 milliGRAM(s) Oral daily  atorvastatin 20 milliGRAM(s) Oral at bedtime  chlorhexidine 2% Cloths 1 Application(s) Topical daily  cyanocobalamin 1000 MICROGram(s) Oral daily  heparin   Injectable 5000 Unit(s) SubCutaneous every 12 hours  levETIRAcetam  Solution 500 milliGRAM(s) Oral two times a day  levothyroxine 75 MICROGram(s) Oral daily    MEDICATIONS  (PRN):  acetaminophen    Suspension .. 650 milliGRAM(s) Enteral Tube every 6 hours PRN Mild Pain (1 - 3)    OBJECTIVE:    PHYSICAL EXAM:       ICU Vital Signs Last 24 Hrs  T(C): 37 (21 Mar 2022 09:12), Max: 37 (21 Mar 2022 09:12)  T(F): 98.6 (21 Mar 2022 09:12), Max: 98.6 (21 Mar 2022 09:12)  HR: 81 (21 Mar 2022 09:12) (81 - 89)  BP: 109/60 (21 Mar 2022 11:49) (103/53 - 147/45)  BP(mean): --  ABP: --  ABP(mean): --  RR: 18 (21 Mar 2022 09:12) (18 - 19)  SpO2: 98% (21 Mar 2022 09:12) (95% - 98%) on 3lpm nasal canula     General: Awake and alert. Cooperative. No distress Appears stated age. Bedbound  HEENT: Atraumatic. Normocephalic. Anicteric. Normal oral mucosa. PERRL. EOMI. Mallampati class IV airway.  Neck: Supple. Trachea midline. Thyroid without enlargement/tenderness/nodules. No carotid bruit. No JVD. Short and wide  Cardiovascular: Regular rate and rhythm. S1 S2 normal. III/VI systolic murmur  Respiratory: Respirations unlabored. Bibasilar rales. Bilateral equal breath sounds. No wheeze. No curvature.  Abdomen: Soft. Non-tender. Non-distended. No organomegaly. No masses. Normal bowel sounds. Obese. PEG.  Extremities: Warm to touch. No clubbing or cyanosis. No pedal edema. Large legs  Pulses: 2+ peripheral pulses all extremities.	  Skin: Normal skin color. No rashes or lesions. No ecchymoses. No cyanosis. Warm to touch.  Lymph Nodes: Cervical, supraclavicular and axillary nodes normal  Neurological: Left lower extremity plegia with contraction. Left upper extremity is quite weak. A and O x 3  Psychiatry: Appropriate mood and affect.    LABS:      CBC    WBC  8.62 <==, 6.50 <==, 6.44 <==    Hemoglobin  9.5 <<==, 9.4 <<==, 9.7 <<==    Hematocrit  32.3 <==, 31.4 <==, 31.9 <==    Platelets  231 <==, 228 <==, 222 <==      140  |  103  |  15  ----------------------------<  129<H>    03-18  4.1   |  24  |  0.77      LYTES    sodium  140 <==, 137 <==    potassium   4.1 <==, 4.9 <==    chloride  103 <==, 98 <==    carbon dioxide  24 <==, 24 <==    =============================================================================================  RENAL FUNCTION:    Creatinine:   0.77  <<==, 0.70  <<==    BUN:   15 <==, 26 <==    ============================================================================================    calcium   9.0 <==, 10.3 <==    ============================================================================================  LFTs    AST:   22 <==     ALT:  15  <==     AP:  100  <=    Bili:  0.2  <=    Venous Blood Gas:   @ 21:50  --/--/--/--/--  VBG Lactate: 2.6    Venous Blood Gas:   @ 19:57  7.39/44/54/27/83.9  VBG Lactate: 3.1    < from: TTE with Doppler (w/Cont) (22 @ 14:08) >    Patient name: FRANKI MEHTA  YOB: 1935   Age: 86 (F)   MR#: [id number]  Study Date: 2022  [facility]  Study quality: difficult due to patient being  Referring Physician: Melinda Segundo MD  Blood Pressure: 109/63 mmHg  Height: 163 cm  Weight: 91 kg  BSA: 2 m2  ------------------------------------------------------------------------  PROCEDURE: Transthoracic echocardiogram with 2-D, M-Mode  and complete spectral and color flow Doppler. Verbal  consent was obtained for injection of  Ultrasonic Enhancing  Agent following a discussion of risks and benefits.  Following intravenous injection of Ultrasonic Enhancing  Agent , harmonic imaging was performed.  INDICATION: Endocarditis, valve unspecified (I38)  ------------------------------------------------------------------------  Dimensions:    Normal Values:  LA:     2.5    2.0 - 4.0 cm  Ao:     3.3    2.0 - 3.8 cm  SEPTUM: 1.6    0.6 - 1.2 cm  PWT:    0.9    0.6 - 1.1 cm  LVIDd:  3.6    3.0 - 5.6 cm  LVIDs:  2.5    1.8 - 4.0 cm  Derived variables:  LVMI: 77 g/m2  RWT: 0.50  Fractional short: 31 %  EF (Visual Estimate): 70-75 %  ------------------------------------------------------------------------  Conclusions:  1. Concentric left ventricular hypertrophy.  2. Hyperdynamic left ventricular systolic function.  Endocardial visualization enhanced with intravenous  injection of Ultrasonic Enhancing Agent (Definity).  3. The right ventricle is not well visualized; grossly  normal right ventricular systolic function.  Pt refused to proceed with exam.  Limited Doppler study.  No trans aortic gradients.  Unable to rule out endocarditis.  ------------------------------------------------------------------------  Confirmed on 2022 - 15:42:57 by COMPA Castillo  ------------------------------------------------------------------------  ---------------------------------------------------------------------------------------------------------------    MICROBIOLOGY:     COVID-19 PCR (22 @ 19:56)   COVID-19 PCR: NotDetec:     Urinalysis Basic - ( 16 Mar 2022 20:31 )    Color: Light Yellow / Appearance: Clear / S.020 / pH: x  Gluc: x / Ketone: Negative  / Bili: Negative / Urobili: Negative   Blood: x / Protein: Negative / Nitrite: Negative   Leuk Esterase: Negative / RBC: 5 /hpf / WBC 1 /HPF   Sq Epi: x / Non Sq Epi: 5 /hpf / Bacteria: Negative    Culture - Urine (22 @ 22:42)   Specimen Source: Clean Catch Clean Catch (Midstream)   Culture Results:   <10,000 CFU/mL Normal Urogenital Regina     RADIOLOGY:  [x ] Chest radiographs reviewed and interpreted by me    EXAM:  CT ABDOMEN AND PELVIS IC                          PROCEDURE DATE:  2022      INTERPRETATION:  CLINICAL INFORMATION: Abdominal pain and diarrhea.    COMPARISON: CT of the abdomen and pelvis from 2022.      FINDINGS:  LOWER CHEST: Bibasilar subsegmental atelectasis. Coronary artery   calcification.    LIVER: Within normal limits.  BILE DUCTS: Normal caliber.  GALLBLADDER: Cholelithiasis.  SPLEEN: Within normal limits.  PANCREAS: Within normal limits.  ADRENALS: Within normal limits.  KIDNEYS/URETERS: No hydronephrosis. Bilateral renal atrophy.    BLADDER: Within normal limits.  REPRODUCTIVE ORGANS: Ovaries within normal limits. Atrophic uterus.    BOWEL: Gastrostomy tube with retention balloon in the body of the   stomach. No bowel obstruction. Scattered sigmoid diverticulosis without   evidence of diverticulitis. Interval decrease in size of a fluid   collection in the region of the previously perforated appendicitis   measuring 2.8 x 1.5 cm previously measuring 4.0 x 2.2 cm. Slight increase   in mild adjacent inflammatory change.  PERITONEUM: No ascites. No pneumoperitoneum.  VESSELS: Within normal limits.  RETROPERITONEUM/LYMPH NODES: No lymphadenopathy.  ABDOMINAL WALL: Subcutaneous injection granulomas. Marked diffuse muscle   atrophy. Diastases of the abdominis rectus muscles. Small fat-containing   umbilical hernia.  BONES: Degenerative changes of the spine. T11 and L1 vertebral body   compression fracture deformities, not significantly changed.    IMPRESSION:  Interval decrease in size of a fluid collection in the region of the   previously perforated appendicitis with slight increase in mild adjacent   inflammatory change for which a superimposed acute infection cannot be   excluded.    RAYMON GUTIÉRREZ MD; Resident Radiologist  This document has been electronically signed.  GRACE MESA MD; Attending Radiologist  This document has been electronically signed. Mar 17 2022 12:37AM  ---------------------------------------------------------------------------------------------------------------  EXAM:  XR CHEST PORTABLE ROUTINE 1V                          PROCEDURE DATE:  03/10/2022      INTERPRETATION:  TECHNIQUE: A single AP view of the chest was obtained.   Ordered time:   3/10/2022 9:39 AM    COMPARISON: 2022    CLINICAL INFORMATION: Acute respiratory failure    FINDINGS:  Patient status post internal fixation of the left shoulder.  The heart is not well assessed on an AP film.  The right lung is clear. There is patchy airspace disease at the left   lung base, likely atelectasis.  There may be a small left pleural effusion.  There is no pneumothorax.    IMPRESSION:    Patchy airspace disease at the left lung base, likely atelectasis.   Underlying infection is not totally excluded.  Possible small left effusion    DIANE CLARK MD; Attending Radiologist  This document has been electronically signed. Mar 11 2022  4:57PM  ---------------------------------------------------------------------------------------------------------------      
    afberile  REVIEW OF SYSTEMS:  GEN: no fever,    no chills  RESP: no SOB,   no cough  CVS: no chest pain,   no palpitations  GI: no abdominal pain,   no nausea,   no vomiting,   no constipation,   no diarrhea  : no dysuria,   no frequency  NEURO: no headache,   no dizziness  PSYCH: no depression,   not anxious  Derm : no rash    MEDICATIONS  (STANDING):  acetylcysteine 20%  Inhalation 4 milliLiter(s) Inhalation three times a day  albuterol/ipratropium for Nebulization 3 milliLiter(s) Nebulizer every 6 hours  allopurinol 100 milliGRAM(s) Oral daily  atorvastatin 20 milliGRAM(s) Oral at bedtime  cyanocobalamin 1000 MICROGram(s) Oral daily  diltiazem    Tablet 30 milliGRAM(s) Oral three times a day  heparin   Injectable 5000 Unit(s) SubCutaneous every 12 hours  levETIRAcetam  Solution 500 milliGRAM(s) Oral two times a day  levothyroxine 75 MICROGram(s) Oral daily  sodium chloride 3%  Inhalation 4 milliLiter(s) Inhalation every 12 hours    MEDICATIONS  (PRN):  acetaminophen    Suspension .. 650 milliGRAM(s) Enteral Tube every 6 hours PRN Mild Pain (1 - 3)      Vital Signs Last 24 Hrs  T(C): 36.5 (20 Mar 2022 08:17), Max: 37.1 (19 Mar 2022 23:27)  T(F): 97.7 (20 Mar 2022 08:17), Max: 98.7 (19 Mar 2022 23:27)  HR: 81 (20 Mar 2022 08:17) (81 - 92)  BP: 108/53 (20 Mar 2022 08:17) (99/57 - 120/57)  BP(mean): --  RR: 20 (20 Mar 2022 08:17) (18 - 20)  SpO2: 96% (20 Mar 2022 08:17) (93% - 98%)  CAPILLARY BLOOD GLUCOSE      POCT Blood Glucose.: 127 mg/dL (20 Mar 2022 05:59)  POCT Blood Glucose.: 116 mg/dL (20 Mar 2022 00:11)    I&O's Summary    19 Mar 2022 07:01  -  20 Mar 2022 07:00  --------------------------------------------------------  IN: 0 mL / OUT: 450 mL / NET: -450 mL        PHYSICAL EXAM:  HEAD:  Atraumatic, Normocephalic  NECK: Supple, No   JVD  CHEST/LUNG:   no     rales,     no,    rhonchi  HEART: Regular rate and rhythm;         murmur  ABDOMEN: Soft, Nontender, ;   EXTREMITIES:  no      edema  NEUROLOGY:  alert    LABS:                        9.5    8.62  )-----------( 231      ( 18 Mar 2022 10:10 )             32.3     03-18    140  |  103  |  15  ----------------------------<  129<H>  4.1   |  24  |  0.77    Ca    9.0      18 Mar 2022 10:10                      Thyroid Stimulating Hormone, Serum: 2.39 uIU/mL (01-07 @ 09:13)          Consultant(s) Notes Reviewed:      Care Discussed with Consultants/Other Providers:    
    afberile  REVIEW OF SYSTEMS:  GEN: no fever,    no chills  RESP: no SOB,   no cough  CVS: no chest pain,   no palpitations  GI: no abdominal pain,   no nausea,   no vomiting,   no constipation,   no diarrhea  : no dysuria,   no frequency  NEURO: no headache,   no dizziness  PSYCH: no depression,   not anxious  Derm : no rash    MEDICATIONS  (STANDING):  acetylcysteine 20%  Inhalation 4 milliLiter(s) Inhalation three times a day  albuterol/ipratropium for Nebulization 3 milliLiter(s) Nebulizer every 6 hours  allopurinol 100 milliGRAM(s) Oral daily  atorvastatin 20 milliGRAM(s) Oral at bedtime  cyanocobalamin 1000 MICROGram(s) Oral daily  guaiFENesin Oral Liquid (Sugar-Free) 300 milliGRAM(s) Oral every 6 hours  heparin   Injectable 5000 Unit(s) SubCutaneous every 12 hours  levETIRAcetam  Solution 500 milliGRAM(s) Oral two times a day  levothyroxine 75 MICROGram(s) Oral daily  metoprolol tartrate 12.5 milliGRAM(s) Oral two times a day  sodium chloride 3%  Inhalation 4 milliLiter(s) Inhalation every 12 hours    MEDICATIONS  (PRN):      Vital Signs Last 24 Hrs  T(C): 36.9 (18 Mar 2022 23:35), Max: 36.9 (18 Mar 2022 23:35)  T(F): 98.4 (18 Mar 2022 23:35), Max: 98.4 (18 Mar 2022 23:35)  HR: 102 (19 Mar 2022 03:47) (93 - 104)  BP: 122/94 (18 Mar 2022 23:35) (100/47 - 122/94)  BP(mean): --  RR: 20 (18 Mar 2022 23:35) (18 - 20)  SpO2: 95% (19 Mar 2022 03:47) (95% - 100%)  CAPILLARY BLOOD GLUCOSE        I&O's Summary    18 Mar 2022 07:01  -  19 Mar 2022 07:00  --------------------------------------------------------  IN: 250 mL / OUT: 0 mL / NET: 250 mL        PHYSICAL EXAM:  HEAD:  Atraumatic, Normocephalic  NECK: Supple, No   JVD  CHEST/LUNG:   no     rales,     no,    rhonchi  HEART: Regular rate and rhythm;         murmur  ABDOMEN: Soft, Nontender, ;   EXTREMITIES:        edema  NEUROLOGY:  alert    LABS:                        9.5    8.62  )-----------( 231      ( 18 Mar 2022 10:10 )             32.3     03-18    140  |  103  |  15  ----------------------------<  129<H>  4.1   |  24  |  0.77    Ca    9.0      18 Mar 2022 10:10                      Thyroid Stimulating Hormone, Serum: 2.39 uIU/mL (01-07 @ 09:13)          Consultant(s) Notes Reviewed:      Care Discussed with Consultants/Other Providers:    
  afberile    REVIEW OF SYSTEMS:  GEN: no fever,    no chills  RESP: no SOB,   no cough  CVS: no chest pain,   no palpitations  GI: no abdominal pain,   no nausea,   no vomiting,   no constipation,   no diarrhea  : no dysuria,   no frequency  NEURO: no headache,   no dizziness  PSYCH: no depression,   not anxious  Derm : no rash    MEDICATIONS  (STANDING):  acetylcysteine 20%  Inhalation 4 milliLiter(s) Inhalation three times a day  albuterol/ipratropium for Nebulization 3 milliLiter(s) Nebulizer every 6 hours  allopurinol 100 milliGRAM(s) Oral daily  atorvastatin 20 milliGRAM(s) Oral at bedtime  chlorhexidine 2% Cloths 1 Application(s) Topical daily  cyanocobalamin 1000 MICROGram(s) Oral daily  diltiazem    Tablet 30 milliGRAM(s) Oral three times a day  heparin   Injectable 5000 Unit(s) SubCutaneous every 12 hours  levETIRAcetam  Solution 500 milliGRAM(s) Oral two times a day  levothyroxine 75 MICROGram(s) Oral daily  sodium chloride 3%  Inhalation 4 milliLiter(s) Inhalation every 12 hours    MEDICATIONS  (PRN):  acetaminophen    Suspension .. 650 milliGRAM(s) Enteral Tube every 6 hours PRN Mild Pain (1 - 3)      Vital Signs Last 24 Hrs  T(C): 36.6 (21 Mar 2022 00:20), Max: 36.6 (20 Mar 2022 16:11)  T(F): 97.9 (21 Mar 2022 00:20), Max: 97.9 (20 Mar 2022 16:11)  HR: 89 (21 Mar 2022 00:20) (81 - 89)  BP: 103/53 (21 Mar 2022 00:20) (103/53 - 147/45)  BP(mean): --  RR: 18 (21 Mar 2022 00:20) (18 - 20)  SpO2: 95% (21 Mar 2022 00:20) (95% - 96%)  CAPILLARY BLOOD GLUCOSE        I&O's Summary    20 Mar 2022 07:01  -  21 Mar 2022 07:00  --------------------------------------------------------  IN: 180 mL / OUT: 250 mL / NET: -70 mL        PHYSICAL EXAM:  HEAD:  Atraumatic, Normocephalic  NECK: Supple, No   JVD  CHEST/LUNG:   no     rales,     no,    rhonchi  HEART: Regular rate and rhythm;         murmur  ABDOMEN: Soft, Nontender, ;   EXTREMITIES:        edema  NEUROLOGY:  alert    LABS:                          Thyroid Stimulating Hormone, Serum: 2.39 uIU/mL (01-07 @ 09:13)          Consultant(s) Notes Reviewed:      Care Discussed with Consultants/Other Providers:

## 2022-03-21 NOTE — DISCHARGE NOTE PROVIDER - HOSPITAL COURSE
86   year old female    h/o hemorrhagic CVA, has  PEG,  HTN, MI,   HLD, gout, right ca  breast   right Ca breast. s/p mastectomy in 2019,  s/p  sentinel node  localization.  4/4,  were  negative  peg dislodged.  IR   replacements  on 1/3/22  s/p rrt   for hypoxia. cxr with complete  collapsed  of  left lung, needing broch,   s/ p  bronchoscopy , pt  with  tracheomalacia  and  collapsed  airways,  makes  this a  difficult  situation, as there is no good rx for this     h/o bacteremia. on 12/19/21,  Staph epi, meth R,  from perforated  appendicitis  ct  c/a/p, from last  visit   pna, perforated  appendicitis,  ?  mets  to  acetabulum, was  seen by dr pacheco   surg/ IR  and  oncology eval dr pacheco   sa w pt.  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 this will not alter rx. pt  will need   extended  course  of ab   on iv  vanco and ertapenem.  till  2/9/.22  ID dr reagan, and per  surg  and  IR  eval,  no intervention   CT  1/20/22, no PE. collection in right side           admitted  with rectal bleed.  *   s/p cva, has  PEG,  npo  ,  on  synthroid, Keppra  ,  *  HTN, on meds  per  card  *  h/o ca breast. /, s/p  R  mastectomy  * obesity, bmi  was   41, now  is  34 in  er  left  leg contracture ,  remains  bed  bound. functional  paraplegias  needs  assistance  for  all her  adl's  * on Proventil,  Mucomyst and   saline  nebs.   * pt  with  h/o  tracheomalacia  and  collapsed  airways,  given pt;s  mlple co morbidities,  pt is  at risk for  re admissions   on nocturnal  bipap  difficult  situation, a s there  is no  good  rx  for  pt's  recurrent lung collapse hypoxia  low  sbp/  lopressor  stopped/ [facility]   gi eval  noted, no intervention on protonix  per  ID, no  ab  c/c   mild  tachycardia  pt optimized  and cleared   for   d/c  to    rehab  son is  awrae      re  admission  likely,, given her  airways, and  propensity  for  lung collapse   pt  is  oxygen dependent  per  son, pt is  full code              86   year old female    h/o hemorrhagic CVA, has  PEG,  HTN, MI,   HLD, gout, right ca  breast   right Ca breast. s/p mastectomy in 2019,  s/p  sentinel node  localization.  4/4,  were  negative  peg dislodged.  IR   replacements  on 1/3/22  s/p rrt   for hypoxia. cxr with complete  collapsed  of  left lung, needing broch,   s/ p  bronchoscopy , pt  with  tracheomalacia  and  collapsed  airways,  makes  this a  difficult  situation, as there is no good rx for this     h/o bacteremia. on 12/19/21,  Staph epi, meth R,  from perforated  appendicitis  ct  c/a/p, from last  visit   pna, perforated  appendicitis,  ?  mets  to  acetabulum, was  seen by dr pacheco   surg/ IR  and  oncology eval dr pacheco   sa w pt.  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 this will not alter rx. pt  will need   extended  course  of ab   on iv  vanco and ertapenem.  till  2/9/.22  ID dr reagan, and per  surg  and  IR  eval,  no intervention   CT  1/20/22, no PE. collection in right side       admitted  with rectal bleed.  *   s/p cva, has  PEG,  npo  ,  on  synthroid, Keppra  ,  *  HTN, on meds  per  card  *  h/o ca breast. /, s/p  R  mastectomy  * obesity, bmi  was   43, now  is  36 in  er  left  leg contracture ,  remains  bed  bound. functional  paraplegias  needs  assistance  for  all her  adl's  * on Proventil,  Mucomyst and   saline  nebs.   * pt  with  h/o  tracheomalacia  and  collapsed  airways,  given pt;s  mlple co morbidities,  pt is  at risk for  re admissions   on nocturnal  bipap  difficult  situation, a s there  is no  good  rx  for  pt's  recurrent lung collapse hypoxia  low  sbp/  lopressor  stopped/ [facility]   gi eval  noted, no intervention on protonix  per  ID, no  ab  c/c   mild  tachycardia  pt optimized  and cleared   for   d/c  to    rehab  son is  awrae      re  admission  likely given her  airways, and  propensity  for  lung collapse   pt  is  oxygen dependent  per  son, pt is  full code  Patient cleared by Dr Segundo for discharge          no

## 2022-03-21 NOTE — PROGRESS NOTE ADULT - REASON FOR ADMISSION
rectal bleed

## 2022-03-21 NOTE — PHYSICAL THERAPY INITIAL EVALUATION ADULT - ADDITIONAL COMMENTS
Pt is a LTC resident at [facility]. PTA pt was completely dependent w/ ADL's, utilized a huey lift for OOB mobility & was not able to ambulate.

## 2022-03-21 NOTE — PHYSICAL THERAPY INITIAL EVALUATION ADULT - PERTINENT HX OF CURRENT PROBLEM, REHAB EVAL
86 y.o. F PMH CVA w/ residual L sided weakness/plegia, & dysphagia / PEG, CAD s/p MI with preserved LVEF and moderate AS, HTN, gout, right breast CA s/p mastectomy in 2019. Presents to the ED c/o BRB per rectum x3 days associated with nausea, and mild abdominal pain.

## 2022-03-21 NOTE — PROGRESS NOTE ADULT - PROVIDER SPECIALTY LIST ADULT
Cardiology
Cardiology
Internal Medicine
Pulmonology
Pulmonology
Cardiology
Cardiology
Internal Medicine
Internal Medicine
Gastroenterology
Internal Medicine

## 2022-03-21 NOTE — DISCHARGE NOTE NURSING/CASE MANAGEMENT/SOCIAL WORK - NSDCPEFALRISK_GEN_ALL_CORE
For information on Fall & Injury Prevention, visit: [email or web address] OR  [email or web address] OR  https://www.cdc.gov/steadi/patient.html

## 2022-03-21 NOTE — PROGRESS NOTE ADULT - ASSESSMENT
86   year old female    h/o hemorrhagic CVA, has  PEG,  HTN, MI,   HLD, gout, right ca  breast   right Ca breast. s/p mastectomy in 2019,  s/p  sentinel node  localization.  4/4,  were  negative  peg dislodged.  IR   replacements  on 1/3/22  s/p rrt   for hypoxia. cxr with complete  collapsed  of  left lung, needing broch,   s/ p  bronchoscopy , pt  with  tracheomalacia  and  collapsed  airways,  makes  this a  difficult  situation, as there is no good rx for this     h/o bacteremia. on 12/19/21,  Staph epi, meth R,  from perforated  appendicitis  ct  c/a/p, from last  visit   pna, perforated  appendicitis,  ?  mets  to  acetabulum, was  seen by dr pacheco   surg/ IR  and  oncology eval dr pacheco   sa w pt.  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 this will not alter rx. pt  will need   extended  course  of ab   on iv  vanco and ertapenem.  till  2/9/.22  ID dr reagan, and per  surg  and  IR  eval,  no intervention   CT  1/20/22, no PE. collection in right side           admitted  with rectal bleed.  *   s/p cva, has  PEG,  npo  ,  on  synthroid, Keppra  ,  *  HTN, on meds  per  card  *  h/o ca breast. /, s/p  R  mastectomy  * obesity, bmi  was   43, now  is  36 in  er  left  leg contracture ,  remains  bed  bound. functional  paraplegias  needs  assistance  for  all her  adl's  * on Proventil,  Mucomyst and   saline  nebs.   * pt  with  h/o  tracheomalacia  and  collapsed  airways,  given pt;s  mlple co morbidities,  pt is  at risk for  re admissions   on nocturnal  bipap  difficult  situation, a s there  is no  good  rx  for  pt's  recurrent lung collapse hypoxia  low  sbp/  lopressor  stopped/ [facility]   gi eval  noted, no intervention on protonix  per  ID, no  ab  c/c   mild  tachycardia  pt optimized  and cleared   for   d/c  to    rehab      re  admission  likely,, given her  airways, and  propensity  for  lung collapse   pt  is  oxygen dependent  per  son, pt is  full code     rad< from: CT Angio Chest PE Protocol w/ IV Cont (01.20.22 @ 10:35) >  IMPRESSION:  Limited exam for pulmonary embolism; no central pulmonary embolism with   nondiagnostic evaluation of lobar, segmental and subsegmental branches.   No imaging evidence of right heart strain.  Complete lingular and left lower lobe collapse withsuperimposed   bronchial impaction. Small left pleural effusion. Mild patchy right   apical groundglass may be related to pulmonary edema, infection or   inflammation.  Multilocular 4.0 x 2.2 cm right lower quadrant fluid collection in the   regionof previously perforated appendix, status post drain removal.   Periappendiceal fat stranding has largely resolved.  Left PICC crosses midline and terminates in the right innominate vein,   likely repositioned during the administration of contrast between    imaging and CTA acquisition.  Cholelithiasis and choledocholithiasis. No intrahepatic biliary ductal   dilatation.  --- End of Report ---  < end of copied text >                   
86   year old female    h/o hemorrhagic CVA, has  PEG,  HTN, MI,   HLD, gout, right ca  breast   right Ca breast. s/p mastectomy in 2019,  s/p  sentinel node  localization.  4/4,  were  negative  peg dislodged.  IR   replacements  on 1/3/22  s/p rrt   for hypoxia. cxr with complete  collapsed  of  left lung, needing broch,   s/ p  bronchoscopy , pt  with  tracheomalacia  and  collapsed  airways,  makes  this a  difficult  situation, as there is no good rx for this     h/o bacteremia. on 12/19/21,  Staph epi, meth R,  from perforated  appendicitis  ct  c/a/p, from last  visit   pna, perforated  appendicitis,  ?  mets  to  acetabulum, was  seen by dr pacheco   surg/ IR  and  oncology eval dr pacheco   sa w pt.  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 this will not alter rx. pt  will need   extended  course  of ab   on iv  vanco and ertapenem.  till  2/9/.22  ID dr reagan, and per  surg  and  IR  eval,  no intervention   CT  1/20/22, no PE. collection in right side           admitted  with rectal bleed.  *   s/p cva, has  PEG,  npo  ,  on  synthroid, Keppra  ,  *  HTN, on meds  per  card  *  h/o ca breast. /, s/p  R  mastectomy  * obesity, bmi  was   43, now  is  36 in  er  left  leg contracture ,  remains  bed  bound. functional  paraplegias  needs  assistance  for  all her  adl's  * on Proventil,  Mucomyst and   saline  nebs.   * pt  with  h/o  tracheomalacia  and  collapsed  airways,  given pt;s  mlple co morbidities,  pt is  at risk for  re admissions   on nocturnal  bipap  difficult  situation, a s there  is no  good  rx  for  pt's  recurrent lung collapse hypoxia  low  sbp/  lopressor  stopped/ [facility]   gi eval  noted, no intervention on protonix  per  ID, no  ab  c/c   mild  tachycardia  pt optimized  and cleared   for   d/c  to    rehab  son is  [facility]      re  admission  likely,, given her  airways, and  propensity  for  lung collapse   pt  is  oxygen dependent  per  son, pt is  full code     rad< from: CT Angio Chest PE Protocol w/ IV Cont (01.20.22 @ 10:35) >  IMPRESSION:  Limited exam for pulmonary embolism; no central pulmonary embolism with   nondiagnostic evaluation of lobar, segmental and subsegmental branches.   No imaging evidence of right heart strain.  Complete lingular and left lower lobe collapse withsuperimposed   bronchial impaction. Small left pleural effusion. Mild patchy right   apical groundglass may be related to pulmonary edema, infection or   inflammation.  Multilocular 4.0 x 2.2 cm right lower quadrant fluid collection in the   regionof previously perforated appendix, status post drain removal.   Periappendiceal fat stranding has largely resolved.  Left PICC crosses midline and terminates in the right innominate vein,   likely repositioned during the administration of contrast between    imaging and CTA acquisition.  Cholelithiasis and choledocholithiasis. No intrahepatic biliary ductal   dilatation.  --- End of Report ---  < end of copied text >                   
86 year old woman brought in from [facility] rehab [place] (there since 2017 per her son Margarita) with history of a CVA ~ 3 years ago resulting in left sided plegia and dysphagia requiring placement of a PEG, HTN, HLD, CAD s/p MI with preserved LVEF and moderate aortic stenosis, right breast CA s/p mastectomy in 2019 recently admitted to [place] in December 2021 for perforated appendicitis with abscess formation s/p tube drainage and infectious endocarditis treated with long course of antibiotics presenting to ED for 3 days of blood per rectum per nursing staff. No BM     #BRBPR: Patient unable to explain sx's; rectal exam with normal stool- neg for melena or blood. FOBT positive. DDX includes diverticular bleed, hemorrhoids, AVM.      Recommendations:     - no indication for procedure while inpatient   - no objection for discharge from GI perspective       Will sign off for now, please call with further questions     Debbie Carrillo   PGY2, IM   
ASSESSMENT:    86 year old gentlewoman, lifelong non-smoker, without history of intrinsic lung disease. 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 She also has a history of HTN, HLD, CAD s/p MI with preserved LVEF and moderate aortic stenosis. The patient was admitted to [place] in December 2021 with fever and abdominal pain. She was found to have perforated appendicitis with abscess formation. She was treated with tube drainage and antibiotics for a polymicrobial infection. Admission CT had debris within the left lobar and more distal airways of the left lower lobe as well as some debris within the right lower lobe airway - there was complete atelectasis of the left lower lobe and subsegmental atelectatic changes within the left upper lobe and dependent portions of the right lung. Hospital course was complicated by respiratory failure due to mucous plugging with complete collapse of the left lung. She underwent bronchoscopy with removal of copious amounts of purulent secretions from throughout the bronchial tree - there was severe tracheobronchomalacia. She was treated with bronchodilators, mucolytic nebs, chest vest and nocturnal AVAPS with expansion of the left upper lobe and some reexpansion of the left lower lobe. She was discharged to rehab on a 3lpm nasal canula with plans to continue medical and mechanical therapy as well as nocturnal BIPAP. She has been doing "well" at rehab and reports that she has been out of bed and into the chair. It does not appear that she has been wearing nocturnal NIV. The patient is sent to the ER from rehab due to bright red blood per rectum with diarrhea over the last few days. The patient is hemodynamically stable without significant anemia. Her respiratory status is stable. The left lower lobe appears well aerated with bibasilar subsegmental atelectasis on the abdominal/pelvic CT scan. Suspect bleeding is related to diverticulosis or hemorrhoids.    CT abdomen/pelvis - scattered sigmoid diverticulosis without evidence of diverticulitis - interval decrease in size of a fluid collection in the region of the previously perforated appendicitis measuring 2.8 x 1.5 cm previously measuring 4.0 x 2.2 cm - slight increase in mild adjacent inflammatory change     PLAN/RECOMMENDATIONS:    stable oxygenation on a nasal canula @ 3lpm  discontinue nocturnal BIPAP - the patient has been refusing  VBG is without evidence of hypercapnia  observe off systemic steroids  discharge on albuterol/atrovent nebs q6h  discharge on hypertonic saline nebs to facilitate mucous clearance  discharge on guaifenesin 300mg 4 times daily via PEG  no indication for antibiotics  cardiology evaluation noted  cardiac meds: diltiazem/lipitor  DVT prophylaxis - SQ heparin - would continue in the outpatient setting  allopurinol/keppra/synthroid  DVT prophylaxis - SQ heparin  GI evaluation noted - no procedures planned - transfuse as needed    Will follow with you. Plan of care discussed with the patient and her RN at bedside. No pulmonary objection to discharge    Kennedy Marcano MD, [facility]  [phone]  Pulmonary Medicine    
ASSESSMENT:    86 year old gentlewoman, lifelong non-smoker, without history of intrinsic lung disease. 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 She also has a history of HTN, HLD, CAD s/p MI with preserved LVEF and moderate aortic stenosis. The patient was admitted to [facility] in December 2021 with fever and abdominal pain. She was found to have perforated appendicitis with abscess formation. She was treated with tube drainage and antibiotics for a polymicrobial infection. Admission CT had debris within the left lobar and more distal airways of the left lower lobe as well as some debris within the right lower lobe airway - there was complete atelectasis of the left lower lobe and subsegmental atelectatic changes within the left upper lobe and dependent portions of the right lung. Hospital course was complicated by respiratory failure due to mucous plugging with complete collapse of the left lung. She underwent bronchoscopy with removal of copious amounts of purulent secretions from throughout the bronchial tree - there was severe tracheobronchomalacia. She was treated with bronchodilators, mucolytic nebs, chest vest and nocturnal AVAPS with expansion of the left upper lobe and some reexpansion of the left lower lobe. She was discharged to rehab on a 3lpm nasal canula with plans to continue medical and mechanical therapy as well as nocturnal BIPAP. She has been doing "well" at rehab and reports that she has been out of bed and into the chair. It does not appear that she has been wearing nocturnal NIV. The patient is sent to the ER from rehab due to bright red blood per rectum with diarrhea over the last few days. The patient is hemodynamically stable without significant anemia. Her respiratory status is stable. The left lower lobe appears well aerated with bibasilar subsegmental atelectasis on the abdominal/pelvic CT scan. Suspect bleeding is related to diverticulosis or hemorrhoids.    CT abdomen/pelvis - scattered sigmoid diverticulosis without evidence of diverticulitis - interval decrease in size of a fluid collection in the region of the previously perforated appendicitis measuring 2.8 x 1.5 cm previously measuring 4.0 x 2.2 cm - slight increase in mild adjacent inflammatory change     PLAN/RECOMMENDATIONS:    stable oxygenation on a nasal canula @ 3lpm  nocturnal BIPAP - patient has been refusing  VBG is without evidence of hypercapnia  observe off systemic steroids  albuterol/atrovent nebs q6h  hypertonic saline nebs to facilitate mucous clearance  guaifenesin 300mg 4 times daily via PEG  started on ceftriaxone - appendicial abscess culture had Escherichia coli, Morganella morganii, Enterococcus avium, Bacteroides and Clostridium ramosum  cardiology evaluation noted  off cardiac medications save for lipitor  DVT prophylaxis - SQ heparin  allopurinol/keppra/synthroid  DVT prophylaxis - SQ heparin  GI evaluation noted - no procedures planned - transfuse as needed    Will follow with you. Plan of care discussed with the patient and her RN at bedside    Kennedy Marcano MD, [facility]  [phone]  Pulmonary Medicine    
86   year old female    h/o hemorrhagic CVA, has  PEG,  HTN, MI,   HLD, gout, right ca  breast   right Ca breast. s/p mastectomy in 2019,  s/p  sentinel node  localization.  4/4,  were  negative  peg dislodged.  IR   replacements  on 1/3/22  s/p rrt   for hypoxia. cxr with complete  collapsed  of  left lung, needing broch,   s/ p  bronchoscopy , pt  with  tracheomalacia  and  collapsed  airways,  makes  this a  difficult  situation, as there is no good rx for this     h/o bacteremia. on 12/19/21,  Staph epi, meth R,  from perforated  appendicitis  ct  c/a/p, from last  visit   pna, perforated  appendicitis,  ?  mets  to  acetabulum, was  seen by dr pacheco   surg/ IR  and  oncology eval dr pacheco   sa w pt.  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 this will not alter rx. pt  will need   extended  course  of ab   on iv  vanco and ertapenem.  till  2/9/.22  ID dr reagan, and per  surg  and  IR  eval,  no intervention   CT  1/20/22, no PE. collection in right side           admitted  with rectal bleed.  *   s/p cva, has  PEG,  npo  ,  on  synthroid, Keppra  ,  *  HTN, on meds  per  card  *  h/o ca breast. /, s/p  R  mastectomy  * obesity, bmi  was   43, now  is  36 in  er  left  leg contracture ,  remains  bed  bound. functional  paraplegias  needs  assistance  for  all her  adl's  * on Proventil,  Mucomyst and   saline  nebs.   * pt  with  h/o  tracheomalacia  and  collapsed  airways,  given pt;s  mlple co morbidities,  pt is  at risk for  re admissions   on nocturnal  bipap  difficult  situation, a s there  is no  good  rx  for  pt's  recurrent lung collapse hypoxia  low  sbp/  lopressor  stopped/ [facility]   gi eval  noted, no intervention on protonix  follow  cx/  on rocephin, [facility] hous e ID f/p      re  admission  likely,, given her  airways, and  propensity  for  lung collapse   pt  is  oxygen dependent  per  son, pt is  full code     rad< from: CT Angio Chest PE Protocol w/ IV Cont (01.20.22 @ 10:35) >  IMPRESSION:  Limited exam for pulmonary embolism; no central pulmonary embolism with   nondiagnostic evaluation of lobar, segmental and subsegmental branches.   No imaging evidence of right heart strain.  Complete lingular and left lower lobe collapse withsuperimposed   bronchial impaction. Small left pleural effusion. Mild patchy right   apical groundglass may be related to pulmonary edema, infection or   inflammation.  Multilocular 4.0 x 2.2 cm right lower quadrant fluid collection in the   regionof previously perforated appendix, status post drain removal.   Periappendiceal fat stranding has largely resolved.  Left PICC crosses midline and terminates in the right innominate vein,   likely repositioned during the administration of contrast between    imaging and CTA acquisition.  Cholelithiasis and choledocholithiasis. No intrahepatic biliary ductal   dilatation.  --- End of Report ---  < end of copied text >                   
86   year old female    h/o hemorrhagic CVA, has  PEG,  HTN, MI,   HLD, gout, right ca  breast   right Ca breast. s/p mastectomy in 2019,  s/p  sentinel node  localization.  4/4,  were  negative  peg dislodged.  IR   replacements  on 1/3/22  s/p rrt   for hypoxia. cxr with complete  collapsed  of  left lung, needing broch,   s/ p  bronchoscopy , pt  with  tracheomalacia  and  collapsed  airways,  makes  this a  difficult  situation, as there is no good rx for this     h/o bacteremia. on 12/19/21,  Staph epi, meth R,  from perforated  appendicitis  ct  c/a/p, from last  visit   pna, perforated  appendicitis,  ?  mets  to  acetabulum, was  seen by dr pacheco   surg/ IR  and  oncology eval dr pacheco   sa w pt.  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 this will not alter rx. pt  will need   extended  course  of ab   on iv  vanco and ertapenem.  till  2/9/.22  ID dr reagan, and per  surg  and  IR  eval,  no intervention   CT  1/20/22, no PE. collection in right side           admitted  with rectal bleed.  *   s/p cva, has  PEG,  npo  ,  on  synthroid, Keppra  ,  *  HTN, on meds  per  card  *  h/o ca breast. /, s/p  R  mastectomy  * obesity, bmi  was   43, now  is  36 in  er  left  leg contracture ,  remains  bed  bound. functional  paraplegias  needs  assistance  for  all her  adl's  * on Proventil,  Mucomyst and   saline  nebs.   * pt  with  h/o  tracheomalacia  and  collapsed  airways,  given pt;s  mlple co morbidities,  pt is  at risk for  re admissions   on nocturnal  bipap  difficult  situation, a s there  is no  good  rx  for  pt's  recurrent lung collapse hypoxia  low  sbp/  lopressor  stopped/ [facility]   gi eval  noted, no intervention on protonix  per  ID, no  ab  c/c   mild  tachycardia      re  admission  likely,, given her  airways, and  propensity  for  lung collapse   pt  is  oxygen dependent  per  son, pt is  full code     rad< from: CT Angio Chest PE Protocol w/ IV Cont (01.20.22 @ 10:35) >  IMPRESSION:  Limited exam for pulmonary embolism; no central pulmonary embolism with   nondiagnostic evaluation of lobar, segmental and subsegmental branches.   No imaging evidence of right heart strain.  Complete lingular and left lower lobe collapse withsuperimposed   bronchial impaction. Small left pleural effusion. Mild patchy right   apical groundglass may be related to pulmonary edema, infection or   inflammation.  Multilocular 4.0 x 2.2 cm right lower quadrant fluid collection in the   regionof previously perforated appendix, status post drain removal.   Periappendiceal fat stranding has largely resolved.  Left PICC crosses midline and terminates in the right innominate vein,   likely repositioned during the administration of contrast between    imaging and CTA acquisition.  Cholelithiasis and choledocholithiasis. No intrahepatic biliary ductal   dilatation.  --- End of Report ---  < end of copied text >

## 2022-03-21 NOTE — DISCHARGE NOTE PROVIDER - NSDCMRMEDTOKEN_GEN_ALL_CORE_FT
allopurinol 100 mg oral tablet: 1 tab(s) orally once a day  atorvastatin 20 mg oral tablet: 1 tab(s) orally once a day (at bedtime)  heparin: 5000 unit(s) subcutaneous every 8 hours  ipratropium-albuterol 0.5 mg-2.5 mg/3 mL inhalation solution: 3 milliliter(s) inhaled every 6 hours  levETIRAcetam 100 mg/mL oral solution: 7.5 milliliter(s) orally 2 times a day  levothyroxine 75 mcg (0.075 mg) oral tablet: 1 tab(s) orally once a day  metoprolol tartrate 25 mg oral tablet: 1 tab(s) orally 2 times a day  nystatin 100,000 units/g topical powder: 1 application topically 2 times a day  Vitamin B12 1000 mcg oral tablet: 1 tab(s) orally once a day   acetaminophen 160 mg/5 mL oral suspension: 20.31 milliliter(s) orally every 6 hours, As needed, Mild Pain (1 - 3)  acetylcysteine 20% inhalation solution: 4 milliliter(s) inhaled 3 times a day  allopurinol 100 mg oral tablet: 1 tab(s) orally once a day  atorvastatin 20 mg oral tablet: 1 tab(s) orally once a day (at bedtime)  dilTIAZem 30 mg oral tablet: 1 tab(s) orally 3 times a day  heparin: 5000 unit(s) subcutaneous every 8 hours  ipratropium-albuterol 0.5 mg-2.5 mg/3 mL inhalation solution: 3 milliliter(s) inhaled every 6 hours  levETIRAcetam 100 mg/mL oral solution: 7.5 milliliter(s) orally 2 times a day  levothyroxine 75 mcg (0.075 mg) oral tablet: 1 tab(s) orally once a day  sodium chloride 3% inhalation solution: 4 milliliter(s) inhaled every 12 hours  Vitamin B12 1000 mcg oral tablet: 1 tab(s) orally once a day   acetaminophen 160 mg/5 mL oral suspension: 20.31 milliliter(s) orally every 6 hours, As needed, Mild Pain (1 - 3)  allopurinol 100 mg oral tablet: 1 tab(s) orally once a day  atorvastatin 20 mg oral tablet: 1 tab(s) orally once a day (at bedtime)  dilTIAZem 30 mg oral tablet: 1 tab(s) orally 3 times a day  heparin: 5000 unit(s) subcutaneous every 8 hours  ipratropium-albuterol 0.5 mg-2.5 mg/3 mL inhalation solution: 3 milliliter(s) inhaled every 6 hours  levETIRAcetam 100 mg/mL oral solution: 7.5 milliliter(s) orally 2 times a day  levothyroxine 75 mcg (0.075 mg) oral tablet: 1 tab(s) orally once a day  sodium chloride 3% inhalation solution: 4 milliliter(s) inhaled every 12 hours  Vitamin B12 1000 mcg oral tablet: 1 tab(s) orally once a day

## 2022-05-02 NOTE — SPEECH LANGUAGE PATHOLOGY EVALUATION - SLP PRAGMATICS
CHIEF COMPLAINT(S): No chief complaint on file.      HISTORY OF PRESENT ILLNESS:  Iain Shore is a 25 year old  female returning to the clinic for follow up of bilateral hip pain. She was last seen in office on 2022. During that office visit she was encouraged to Iain Shore is a 25 year old  female returning to the clinic for follow up of bilateral hip and bilateral knee pain. She was last seen in office on 2022. During that office visit she was offered a bursa steroid injection, encouraged to continue home exercise program and meloxicam. She reports  ***  Accompanied by ***  Location:bilateral hip  Date of Onset: over 3 years ago  Context: see above  Did this injury occur at work: No  Severity:{#:43288}/10   Timing: {ssk pain timin}  Quality: {ssk pain adjective:909750}  Associated signs and symptoms: {ssk pain related symptoms:446773}  Aggravating factors: {[facility] aggrevating factors:363568}  Alleviating factors: {k alleviating factors:695946}  Patient feels: ***/100  Patient feels *** improved.  ***      Review of Systems   Constitutional: Negative for activity change, fatigue and fever.   HENT: Negative for ear pain, hearing loss, sinus pressure, sinus pain, sore throat, tinnitus and trouble swallowing.    Eyes: Negative for photophobia, pain, redness and visual disturbance.   Respiratory: Negative for chest tightness, shortness of breath and wheezing.    Cardiovascular: Negative for chest pain and palpitations.   Gastrointestinal: Negative for abdominal pain, constipation, diarrhea and nausea.   Genitourinary: Negative for difficulty urinating, frequency and hematuria.   Musculoskeletal: Negative for arthralgias, back pain, gait problem, joint swelling, myalgias, neck pain and neck stiffness.   Allergic/Immunologic: Negative for environmental allergies and food allergies.   Neurological: Negative for dizziness, seizures, syncope, numbness and headaches.   Psychiatric/Behavioral: Negative for 
agitation and sleep disturbance. The patient is not nervous/anxious.        The patient was instructed to follow up with PCP regarding all positive ROS that were not directly pertinent with the chief complaint.    Roomed By: ***    Past Medical History Updated: {YES (DEF)/NO:65595}  PAST MEDICAL HISTORY:  Past Medical History:   Diagnosis Date   • Allergic rhinitis 2021   • Asthma    • Exercise-induced asthma 5/3/2021   • Gastroesophageal reflux disease without esophagitis 2021   • Palpitations 2021   • RAD (reactive airway disease)    • Vitamin D deficiency 2021       Past Surgical History Updated: {YES (DEF)/NO:57376}  PAST SURGICAL HISTORY:  Past Surgical History:   Procedure Laterality Date   • Appendectomy         Past Family History Updated: {YES (DEF)/NO:78349}  FAMILY HISTORY:  Family History   Problem Relation Age of Onset   • Patient is unaware of any medical problems Mother    • Patient is unaware of any medical problems Father      Reviewed and non-contributory to patient's illness unless otherwise stated above    Past Social History Updated: {YES (DEF)/NO:33507}  SOCIAL HISTORY:  Social History     Socioeconomic History   • Marital status: /Civil Union     Spouse name: Edy   • Number of children: 0   • Years of education: Not on file   • Highest education level: Associate degree: academic program   Occupational History   • Occupation:  home      Comment: [employer]  Home   Tobacco Use   • Smoking status: Never Smoker   • Smokeless tobacco: Never Used   Vaping Use   • Vaping Use: never used   Substance and Sexual Activity   • Alcohol use: Not Currently   • Drug use: Never   • Sexual activity: Yes     Partners: Male     Birth control/protection: I.U.D.   Other Topics Concern   • Not on file   Social History Narrative    Iain lives at home with , 2 birds and no smokers.      Social Determinants of Health     Financial Resource Strain: Not on 
file   Food Insecurity: Not on file   Transportation Needs: Not on file   Physical Activity: Not on file   Stress: Not on file   Social Connections: Not on file   Intimate Partner Violence: Not on file        MEDICATIONS:  Current Outpatient Medications   Medication Sig Dispense Refill   • meloxicam (MOBIC) 7.5 MG tablet Take 1 tablet by mouth daily. 14 tablet 1   • fluticasone (FLONASE) 50 MCG/ACT nasal spray Spray 1 spray in each nostril daily. 16 g 2   • albuterol 108 (90 Base) MCG/ACT inhaler Inhale 2 puffs into the lungs every 4 hours as needed for Shortness of Breath or Wheezing. 1 each 1   • Cholecalciferol (Vitamin D3) 250 mcg (10,000 units) capsule Take 250 mcg by mouth daily.     • Mirena, 52 MG, 20 MCG/24HR intrauterine device by Intrauterine route 1 time. Indications: Excessive Amount of Menstrual Volume       No current facility-administered medications for this visit.     ALLERGIES:     ALLERGIES:   Allergen Reactions   • Amoxicillin HIVES       VITAL SIGNS:  Visit Vitals  LMP  (LMP Unknown)     There is no height or weight on file to calculate BMI.    EXAM:  This is a 25 year old female, awake, alert, and cooperative. She is well nourished, well developed, and in no apparent distress.  Pulmonary - No increased work of breathing.  Lymphatics - There is no evidence of generalized adenopathy.  ***  Radiology: ***    ASSESSMENT & PLAN:  Iain Shore is a 25 year old female ***  Plan as follows:  1. ***  2. ***  3. ***  4. ***     The patient will follow up with us in {NUMBERS 1-31:752438} {days wks mos yr:975324}    Restrictions: ***    Total time was *** minutes. This includes chart review before the visit, actual time spent with patient, and time spent on documentation after the visit.    Electronically Signed by:    Edy Finn MD 5/2/2022    This note was dictated by speech recognition software. Minor errors in transcription may be present.    
turn taking deficits
topic maintenance problems/turn taking deficits

## 2022-06-03 NOTE — DISCHARGE NOTE ADULT - NS AS DC FOLLOWUP INST YN
Attempted to contact patient and no answer . Left message for patient to give us a call at [phone] number as soon as possible. Lab released to portal . A communication letter send to patient  
Yes

## 2022-07-20 NOTE — ED PROVIDER NOTE - NSCAREINITIATED _GEN_ER
[de-identified] : The documentation recorded by the scribe accurately reflects the service I personally performed and the decisions made by me.\par I, Nikki Cavazos, attest that this documentation has been prepared under the direction and in the presence of Provider Jose Pierce MD.\par \par The patient was seen by Jose Pierce MD\par 
Yuval Barnes(Resident)

## 2022-08-27 NOTE — ED PROVIDER NOTE - PHYSICAL EXAMINATION
GENERAL: awake, alert, hoarse voice, increased WOB, uncomfortable  HEAD:  Atraumatic, Normocephalic  EYES: EOMI b/l, PERRLA b/l, conjunctiva and sclera clear  CHEST/LUNG: + wheezing, rhonchi, increased WOB  HEART: Regular rate and rhythm; S1 and S2 present, No murmurs, rubs, or gallops  ABDOMEN: Soft, Nontender, Nondistended; Bowel sounds present, peg tube in place  EXTREMITIES:  LUE and LLE contracted, RLE swelling at ankle  NEURO: AAOx2, non-focal   SKIN: No rashes or lesions

## 2022-08-27 NOTE — ED PROVIDER NOTE - OBJECTIVE STATEMENT
88yo F w/ PMHX of HTN, HLD, CAD, CVA w/ residual L sided weakness, tracheobronchomalacia, Aortic stenosis, intrinsic lung disease presented from [facility] w/ acute onset SOB and increased WOB associated to back pain. Patient was unable to provide more history.

## 2022-08-27 NOTE — ED PROVIDER NOTE - CLINICAL SUMMARY MEDICAL DECISION MAKING FREE TEXT BOX
Patient presented from NH w/ acute change in respiratory status consisting of SOB, increased WOB and changes in voice. Due to history of tracheobronchomalacia ENT was consulted to evaluated vocal cords. MICU was consulted due to complex medical history and possible difficult airway; they determined she is not a candidate for admission to the MICU; MICU team recommended starting Bpap. Patient will admitted to medicine w/ BPAP for further management. She was started on empiric ABx for asp pna.

## 2022-08-27 NOTE — ED ADULT NURSE REASSESSMENT NOTE - NS ED NURSE REASSESS COMMENT FT1
Straight catheterization performed to obtain UA/UC sample, procedure explained to pt, 2nd RN at bedside, sterile technique maintained, 100ml cloudy yellow urine drained, UA/UC sent to lab, pt cleaned, fresh diaper and linens provided, pt tolerated well, side rails raised, comfort and safety maintained.

## 2022-08-27 NOTE — ED ADULT NURSE REASSESSMENT NOTE - NS ED NURSE REASSESS COMMENT FT1
pt increasingly short of breath, md verdin at bedside to assess- respiratory called start on bipap. pt repositioned in bed

## 2022-08-27 NOTE — CONSULT NOTE ADULT - ASSESSMENT
86 year female c/o SOB, per chart h/o respiratory failure due to mucous plugging with complete collapse of the left lung where bronchoscopy with removal of copious amounts of purulent secretions from throughout the bronchial tree - there was severe tracheobronchomalacia. At bedside, pt complaining of SOB, unable to provide additional history. Laryngoscopy done at bedside, vocal cords mobile and intact. No edema or upper airway obstruction. Upper airway patent

## 2022-08-27 NOTE — CONSULT NOTE ADULT - SUBJECTIVE AND OBJECTIVE BOX
CC: SOB    HPI: 86 year female w/ history of a CVA ~ 3 years ago resulting in left sided plegia and dysphagia requiring placement of a PEG. She also has a history of HTN, HLD, CAD s/p MI with preserved LVEF and moderate aortic stenosis, h/o perforated appendicitis with abscess formation. Noted from chart h/o respiratory failure due to mucous plugging with complete collapse of the left lung where bronchoscopy with removal of copious amounts of purulent secretions from throughout the bronchial tree - there was severe tracheobronchomalacia. At bedside pt complaining of SOB, unable to provide additional history.      PAST MEDICAL & SURGICAL HISTORY:  MI (myocardial infarction)      HTN (hypertension)      Personal history of asbestosis      Gout      Dyslipidemia      UTI (lower urinary tract infection)      ETOH abuse      CVA (cerebral vascular accident)      History of left shoulder fracture      History of benign breast tumor        Allergies    Toradol (Urticaria (Mild to Mod); Rash (Mild to Mod))    Intolerances      MEDICATIONS  (STANDING):  albuterol/ipratropium for Nebulization 3 milliLiter(s) Nebulizer every 6 hours    MEDICATIONS  (PRN):      Social History: unable to provide     Family history: unable to provide     ROS: unable to provide       Vital Signs Last 24 Hrs  T(C): 37.1 (27 Aug 2022 21:45), Max: 37.2 (27 Aug 2022 19:45)  T(F): 98.7 (27 Aug 2022 21:45), Max: 98.9 (27 Aug 2022 19:45)  HR: 104 (27 Aug 2022 21:45) (88 - 104)  BP: 111/67 (27 Aug 2022 21:45) (111/67 - 112/70)  BP(mean): --  RR: 23 (27 Aug 2022 21:45) (22 - 23)  SpO2: 100% (27 Aug 2022 21:45) (96% - 100%)    Parameters below as of 27 Aug 2022 21:45  Patient On (Oxygen Delivery Method): nasal cannula  O2 Flow (L/min): 3                            10.1   14.46 )-----------( 298      ( 27 Aug 2022 21:06 )             31.8    08-27    131<L>  |  94<L>  |  44<H>  ----------------------------<  170<H>  5.8<H>   |  22  |  0.69    Ca    9.8      27 Aug 2022 21:06    TPro  7.7  /  Alb  4.1  /  TBili  0.2  /  DBili  x   /  AST  29  /  ALT  16  /  AlkPhos  137<H>  08-27       PHYSICAL EXAM:  Gen: NAD  Skin: No rashes, bruises, or lesions  Head: Normocephalic, Atraumatic  Face: no edema, erythema, or fluctuance. Parotid glands soft without mass  Eyes: no scleral injection  Nose: Nares bilaterally patent, no discharge  Mouth: No Stridor / Drooling / Trismus.  Mucosa moist, tongue/uvula midline, oropharynx clear  Neck: Flat, supple, no lymphadenopathy, trachea midline, no masses  Lymphatic: No lymphadenopathy  Resp: breathing easily, no stridor  CV: no peripheral edema/cyanosis  GI: nondistended   Peripheral vascular: no JVD or edema  Neuro: facial nerve intact, no facial droop      Fiberoptic Indirect laryngoscopy:  (Scope #2 used)  Reason for Laryngoscopy:    Patient was unable to cooperate with mirror.  Nasopharynx, oropharynx, and hypopharynx clear, no bleeding. Tongue base, posterior pharyngeal wall, vallecula, epiglottis, and subglottis appear normal. No erythema, edema, pooling of secretions, masses or lesions. Airway patent, no foreign body visualized. No glottic/supraglottic edema. True vocal cords, arytenoids, vestibular folds, ventricles, pyriform sinuses, and aryepiglottic folds appear normal bilaterally. Vocal cords mobile with good contact b/l.

## 2022-08-27 NOTE — CONSULT NOTE ADULT - SUBJECTIVE AND OBJECTIVE BOX
CHIEF COMPLAINT: "I can't breath"    HPI:    86y F pmhx severe tracheobronchomalacia obesity, CVA, htn, hld, CAD (prior echo 2022 EF 70-75%) R Breast Ca with recent admission L lung collapse and MRSMOSHE presenting from rehab with dyspnea MICU consulted for airway watch. Prior admisison with respiratory failure mucous plugging with complete left lung collapse and severe tracheobronchomalacia.     History obtained from patient limited by dyspnea.     Collateral obtained from ED- given nebs and steroids at rehab. Uses 3L NC at facility. Unsure of adherence or use of CPAP/bipap.     PAST MEDICAL & SURGICAL HISTORY:  MI (myocardial infarction)      HTN (hypertension)      Personal history of asbestosis      Gout      Dyslipidemia      UTI (lower urinary tract infection)      ETOH abuse      CVA (cerebral vascular accident)      History of left shoulder fracture      History of benign breast tumor          FAMILY HISTORY:  No pertinent family history in first degree relatives        SOCIAL HISTORY:  Smoking: [ ] Never Smoked [ ] Former Smoker (__ packs x ___ years) [ ] Current Smoker  (__ packs x ___ years)  Substance Use: [ ] Never Used [ ] Used ____  EtOH Use:  Marital Status: [ ] Single [ ]  [ ]  [ ]   Sexual History:   Occupation:  Recent Travel:  Country of Birth:  Advance Directives:    Allergies    Toradol (Urticaria (Mild to Mod); Rash (Mild to Mod))    Intolerances        HOME MEDICATIONS:    REVIEW OF SYSTEMS:    Pulm: shortness of breath     Remainder of ROS limited by dyspnea.    OBJECTIVE:  ICU Vital Signs Last 24 Hrs  T(C): 37.1 (27 Aug 2022 21:45), Max: 37.2 (27 Aug 2022 19:45)  T(F): 98.7 (27 Aug 2022 21:45), Max: 98.9 (27 Aug 2022 19:45)  HR: 104 (27 Aug 2022 21:45) (88 - 104)  BP: 111/67 (27 Aug 2022 21:45) (111/67 - 112/70)  BP(mean): --  ABP: --  ABP(mean): --  RR: 23 (27 Aug 2022 21:45) (22 - 23)  SpO2: 100% (27 Aug 2022 21:45) (96% - 100%)    O2 Parameters below as of 27 Aug 2022 21:45  Patient On (Oxygen Delivery Method): nasal cannula  O2 Flow (L/min): 3            CAPILLARY BLOOD GLUCOSE          PHYSICAL EXAM:  General: Elderly woman laying on bed with head of bed elevated  HEENT: mallampati III-IV airway  Neck: obese neck   Respiratory:   Cardiovascular: RRR  Abdomen: obese abdomen soft and nondistended.   Neurological: limited assessment due to dyspnea    LINES:     HOSPITAL MEDICATIONS:    vancomycin  IVPB. 1000 milliGRAM(s) IV Intermittent once        albuterol/ipratropium for Nebulization 3 milliLiter(s) Nebulizer every 6 hours                      LABS:                        10.1   14.46 )-----------( 298      ( 27 Aug 2022 21:06 )             31.8     Hgb Trend: 10.1<--      131<L>  |  94<L>  |  44<H>  ----------------------------<  170<H>  5.8<H>   |  22  |  0.69    Ca    9.8      27 Aug 2022 21:06    TPro  7.7  /  Alb  4.1  /  TBili  0.2  /  DBili  x   /  AST  29  /  ALT  16  /  AlkPhos  137<H>      Creatinine Trend: 0.69<--    Urinalysis Basic - ( 27 Aug 2022 22:42 )    Color: Light Orange / Appearance: Turbid / S.013 / pH: x  Gluc: x / Ketone: Negative  / Bili: Negative / Urobili: Negative   Blood: x / Protein: 30 mg/dL / Nitrite: Negative   Leuk Esterase: Large / RBC: 8 /hpf / WBC 1145 /HPF   Sq Epi: x / Non Sq Epi: 1 /hpf / Bacteria: Moderate        Venous Blood Gas:   @ 20:50  7.31/48/57/24/86.9  VBG Lactate: 2.5      MICROBIOLOGY:     RADIOLOGY:  [ ] Reviewed and interpreted by me    EKG:

## 2022-08-27 NOTE — ED PROVIDER NOTE - NS ED ROS FT
Limited evaluation patient was unable to provide detailed history  Patient reported SOB and back pain

## 2022-08-27 NOTE — CONSULT NOTE ADULT - ATTENDING COMMENTS
86y F pmhx severe tracheobronchomalacia obesity, CVA, htn, hld, CAD (prior echo 1/2022 EF 70-75%) R Breast Ca with recent admission L lung collapse and MRSE presenting from rehab with dyspnea likely due uti? sepsis?  causing tachypnea witch in the setting of severe tracheobronchomalacia is not well tolerated and is accompanied by a bad wheeze.    This will not respond to steroids or nebs and is a pure mechanical issue.  Positive pressure or elective trach with vent is her only choices. No benefit to early intubation, no ICU intervention to change this.    Would address GOC as ultimately this woman with dysphagia will choke on CPAP.  Again I don't believe intubating her early or any ICU intervention will have an impact.    GOC and either trach or comfort care as her dyspnea from her airway collapse with any exertion is not fixable.

## 2022-08-27 NOTE — ED ADULT NURSE REASSESSMENT NOTE - NS ED NURSE REASSESS COMMENT FT1
Breathing improving, pt states she is breathing "a little better," less wheezing/gurgling, less accessory muscle use noted, pt states back pain slightly improved, ENT at bedside to scope patient, pt remains on 2L NC, O2 sat %, pt in view of nurse's station, VSS, side rails raised, comfort and safety maintained.

## 2022-08-27 NOTE — ED ADULT NURSE NOTE - OBJECTIVE STATEMENT
87 y.o. F A&Ox2 PMHx of resp. arrest, MI, dementia, anemia, HLD, gout, GERD, Hypothyroidism, CVA with L sided residual contraction, COPD on 3 L baseline O2, HTN, seizure disorder presenting to ED from [facility] rehab facility for SOB. EMS states that pt started to complain of back pain and was subsequently short of breath.

## 2022-08-27 NOTE — ED PROVIDER NOTE - ATTENDING CONTRIBUTION TO CARE
RGUJRAL 88yo f hx listed BIB [facility] with SOB and back pain. Patient states she always has back pain but currently stating she "can't breathe". Patient is wheezing b/l with moderate respiratory distress. Pt given solumedrol and duoneb prior to arrival. On exam, patient morbidly obese with b/l expiratory wheeze. Patient is on 3LNC and has no increase requirements. Denies chest pain, abd pain.   Pt given duoneb with improvement in symptoms, O2Sat on 100%, attempted suction with some improvement. Will have ENT scope patient for any edema. Check labs, xray chest, will treat for possible aspiration, pt noted to be hypotensive at [facility], BP stable in ED.    Patient placed on CPAP, MICU consulted states patient can be admitted to medicine and will follow, GOC discussed with family patient is full code.

## 2022-08-27 NOTE — CONSULT NOTE ADULT - ASSESSMENT
86y F pmhx severe tracheobronchomalacia obesity, CVA, htn, hld, CAD (prior echo 1/2022 EF 70-75%) R Breast Ca with recent admission L lung collapse and MRSE presenting from rehab with dyspnea likely due tracheobronchomalacia in setting of noninvasive positive pressure nonadherence.     #dyspnea  -laryngoscopy by ENT with vocal cords mobile and intact, no edema, upper airway patent  -previously received nebs, steroids without improvement  -this is likely driven by her tracheobronchomalacia and adherence to positive pressure ventilation would help  -Reccomend CPAP or BiPAP with EPAP of at least 10.     Discussed with MICU attending.  86y F pmhx severe tracheobronchomalacia obesity, CVA, htn, hld, CAD (prior echo 1/2022 EF 70-75%) R Breast Ca with recent admission L lung collapse and MRSE presenting from rehab with dyspnea likely due tracheobronchomalacia in setting of noninvasive positive pressure nonadherence.     #dyspnea  -laryngoscopy by ENT with vocal cords mobile and intact, no edema, upper airway patent  -previously received nebs, steroids without improvement  -this is likely driven by her tracheobronchomalacia and adherence to positive pressure ventilation would help  -Reccomend CPAP or BiPAP with EPAP of at least 10.     Not a candidate for MICU at this time.     Discussed with MICU attending.

## 2022-08-28 NOTE — CONSULT NOTE ADULT - ASSESSMENT
ASSESSMENT:    Asked by the [facility] housestaff to evaluate the patient in pulmonary consultation. The patient is a 87 year old gentlewoman, lifelong non-smoker, well known to me without history of intrinsic lung disease. 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 She also has a history of HTN, HLD, CAD s/p MI with preserved LVEF and moderate aortic stenosis. The patient was admitted to [place] in December 2021 with fever and abdominal pain. She was found to have perforated appendicitis with abscess formation. She was treated with tube drainage and antibiotics for a polymicrobial infection. Hospital course was complicated by respiratory failure due to mucous plugging with complete collapse of the left lung. She underwent several bronchoscopies for pulmonary toilet and was found to have severe tracheobronchomalacia. She was treated with bronchodilators, mucolytic nebs, chest vest and nocturnal AVAPS with expansion of the left upper lobe and some reexpansion of the left lower lobe. She was admitted in March with hematochezia with a surprisingly stable respiratory status. The left lower lobe was well aerated on a CT scan of the abdomen and pelvis with only bibasilar subsegmental atelectasis - there was scattered sigmoid diverticulosis without evidence of diverticulitis - interval decrease in size of a fluid collection in the region of the previously perforated appendicitis measuring 2.8 x 1.5 cm previously measuring 4.0 x 2.2 cm - slight increase in mild adjacent inflammatory change. The GI bleed was treated conservatively.  She has been doing "well" at rehab although she remains bedbound and NPO. She is no longer wearing nocturnal NIV.  She returns with shortness of breath in the setting of back, neck and left lower extremity pain. ENT evaluation -> no upper airway pathology. The patient had been placed on BIPAP for mild acute hypercapnic respiratory failure that has resolved and to buttress open the airways. She is awake and alert. She currently has no shortness of breath or hypoxemia on room air. No cough, sputum production, hemoptysis, chest congestion or wheeze.  No fevers, chills or sweats. No chest pain/pressure or palpitations.     the patient returns from [facility] rehab with dyspnea with mild hypercapnic respiratory failure in the setting of neck, back and left leg pain (chronically contracted) - the respiratory acidosis has resolved with NIV support and the patient is now without shortness of breath or hypoxemia on room air - the patient has severe tracheobronchomalacia that has resulted in mucous plugging with complete left lung collapse requiring several bronchoscopies for pulmonary toilet - the patient was felt not to be a candidate for surgery for the tracheobronchomalacia and stenting was felt to have more risk than benefit - she has not had significant mucuos plugging for ~ 6 months - CXR reveals a poor inspiratory effort and subsegmental left lower lobe atelectasis - it is unlikely that she has pneumonia despite the mild leukocytosis    PLAN/RECOMMENDATIONS:    oxygen supplementation to keep saturation greater than 92% - she currently is stable on room air  no indication for NIV which has been correctly discontinued by the housestaff  no indication for bronchoscopy at this time  chest CT to better evaluate the airways and lung parenchyma  incentive spirometry  acapella device  continue zosyn for now - await procalcitonin level and blood cultures - off vancomycin as the MRSA nasal swab is negative  albuterol/atrovent nebs q6h  hypertonic saline nebs q12h  guaifenesin 300mg 4 times daily via PEG  would repeat a speech and swallow evaluation - the patient's mental status is much improved from the time of prior testing and she has had speech therapy in rehab  treatment of hyponatremia per primary team  treatment of HTN, HLD, CAD, aortic stenosis per cardiology  DVT prophylaxis  analgesics    Thank you for the courtesy of this referral. Plan of care discussed with the patient at bedside. Will reach out to the housestaff.     Kennedy Marcano MD, [facility]  [phone]  Pulmonary Medicine        ASSESSMENT:    Asked by the [facility] housestaff to evaluate the patient in pulmonary consultation. The patient is a 87 year old gentlewoman, lifelong non-smoker, well known to me without history of intrinsic lung disease. 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 She also has a history of HTN, HLD, CAD s/p MI with preserved LVEF and moderate aortic stenosis. The patient was admitted to [place] in December 2021 with fever and abdominal pain. She was found to have perforated appendicitis with abscess formation. She was treated with tube drainage and antibiotics for a polymicrobial infection. Hospital course was complicated by respiratory failure due to mucous plugging with complete collapse of the left lung. She underwent several bronchoscopies for pulmonary toilet and was found to have severe tracheobronchomalacia. She was treated with bronchodilators, mucolytic nebs, chest vest and nocturnal AVAPS with expansion of the left upper lobe and some reexpansion of the left lower lobe. She was admitted in March with hematochezia with a surprisingly stable respiratory status. The left lower lobe was well aerated on a CT scan of the abdomen and pelvis with only bibasilar subsegmental atelectasis - there was scattered sigmoid diverticulosis without evidence of diverticulitis - interval decrease in size of a fluid collection in the region of the previously perforated appendicitis measuring 2.8 x 1.5 cm previously measuring 4.0 x 2.2 cm - slight increase in mild adjacent inflammatory change. The GI bleed was treated conservatively.  She has been doing "well" at rehab although she remains bedbound and NPO. She is no longer wearing nocturnal NIV.  She returns with shortness of breath in the setting of back, neck and left lower extremity pain. ENT evaluation -> no upper airway pathology. The patient had been placed on BIPAP for mild acute hypercapnic respiratory failure that has resolved and to buttress open the airways. She is awake and alert. She currently has no shortness of breath or hypoxemia on room air. No cough, sputum production, hemoptysis, chest congestion or wheeze. No fevers, chills or sweats. No chest pain/pressure or palpitations.     the patient returns from [facility] rehab with dyspnea with mild hypercapnic respiratory failure in the setting of neck, back and left leg pain (chronically contracted) - the respiratory acidosis has resolved with NIV support and the patient is now without shortness of breath or hypoxemia on room air - the patient has severe tracheobronchomalacia that has resulted in mucous plugging with complete left lung collapse requiring several bronchoscopies for pulmonary toilet - the patient was felt not to be a candidate for surgery for the tracheobronchomalacia and stenting was felt to have more risk than benefit - she has not had significant mucous plugging for ~ 6 months - CXR reveals a poor inspiratory effort and subsegmental left lower lobe atelectasis - it is unlikely that she has pneumonia despite the mild leukocytosis    PLAN/RECOMMENDATIONS:    oxygen supplementation to keep saturation greater than 92% - she currently is stable on room air  no indication for NIV which has been correctly discontinued by the housestaff  no indication for bronchoscopy at this time  chest CT to better evaluate the airways and lung parenchyma  incentive spirometry  acapella device  continue zosyn for now - await procalcitonin level and blood cultures - off vancomycin as the MRSA nasal swab is negative  albuterol/atrovent nebs q6h  hypertonic saline nebs q12h  guaifenesin 300mg 4 times daily via PEG  would repeat a speech and swallow evaluation - the patient's mental status is much improved from the time of prior testing and she has had speech therapy in rehab  treatment of hyponatremia per primary team  treatment of HTN, HLD, CAD, aortic stenosis per cardiology  DVT prophylaxis  analgesics    Thank you for the courtesy of this referral. Plan of care discussed with the patient at bedside and the housestaff.     Kennedy Marcano MD, [facility]  [phone]  Pulmonary Medicine

## 2022-08-28 NOTE — PROGRESS NOTE ADULT - SUBJECTIVE AND OBJECTIVE BOX
afberile    REVIEW OF SYSTEMS:  GEN: no fever,    no chills  RESP: no SOB,   no cough  CVS: no chest pain,   no palpitations  GI: no abdominal pain,   no nausea,   no vomiting,   no constipation,   no diarrhea  : no dysuria,   no frequency  NEURO: no headache,   no dizziness  PSYCH: no depression,   not anxious  Derm : no rash    MEDICATIONS  (STANDING):  albuterol/ipratropium for Nebulization 3 milliLiter(s) Nebulizer every 6 hours  allopurinol 100 milliGRAM(s) Oral daily  atorvastatin 20 milliGRAM(s) Oral at bedtime  cyanocobalamin 1000 MICROGram(s) Oral daily  diltiazem    Tablet 30 milliGRAM(s) Oral three times a day  enoxaparin Injectable 40 milliGRAM(s) SubCutaneous every 24 hours  escitalopram 10 milliGRAM(s) Oral daily  levETIRAcetam  Solution 750 milliGRAM(s) Oral two times a day  levothyroxine 75 MICROGram(s) Oral daily  multivitamin 1 Tablet(s) Oral daily  piperacillin/tazobactam IVPB. 3.375 Gram(s) IV Intermittent once  piperacillin/tazobactam IVPB.. 3.375 Gram(s) IV Intermittent every 8 hours  sodium chloride 3%  Inhalation 4 milliLiter(s) Inhalation every 12 hours  vancomycin  IVPB 1000 milliGRAM(s) IV Intermittent every 12 hours    MEDICATIONS  (PRN):  acetaminophen    Suspension .. 650 milliGRAM(s) Oral every 6 hours PRN Temp greater or equal to 38C (100.4F), Mild Pain (1 - 3)  traMADol 50 milliGRAM(s) Oral two times a day PRN Severe Pain (7 - 10)      Vital Signs Last 24 Hrs  T(C): 36.4 (28 Aug 2022 12:19), Max: 37.2 (27 Aug 2022 19:45)  T(F): 97.6 (28 Aug 2022 12:19), Max: 98.9 (27 Aug 2022 19:45)  HR: 99 (28 Aug 2022 12:19) (88 - 111)  BP: 102/70 (28 Aug 2022 12:19) (102/70 - 127/96)  BP(mean): --  RR: 18 (28 Aug 2022 12:19) (18 - 27)  SpO2: 99% (28 Aug 2022 12:19) (96% - 100%)    Parameters below as of 28 Aug 2022 12:19  Patient On (Oxygen Delivery Method): BiPAP/CPAP      CAPILLARY BLOOD GLUCOSE        I&O's Summary      PHYSICAL EXAM:  HEAD:  Atraumatic, Normocephalic  NECK: Supple, No   JVD  CHEST/LUNG:   no     rales,     no,    rhonchi  HEART: Regular rate and rhythm;         murmur  ABDOMEN: Soft, Nontender, ;   EXTREMITIES:        edema  NEUROLOGY:  alert    LABS:                        9.6    13.93 )-----------( 296      ( 28 Aug 2022 05:23 )             30.6         132<L>  |  94<L>  |  44<H>  ----------------------------<  141<H>  4.9   |  22  |  0.72    Ca    9.7      28 Aug 2022 06:51  Phos  4.7       Mg     2.3         TPro  7.6  /  Alb  4.0  /  TBili  0.2  /  DBili  x   /  AST  37  /  ALT  16  /  AlkPhos  137<H>            Urinalysis Basic - ( 27 Aug 2022 22:42 )    Color: Light Orange / Appearance: Turbid / S.013 / pH: x  Gluc: x / Ketone: Negative  / Bili: Negative / Urobili: Negative   Blood: x / Protein: 30 mg/dL / Nitrite: Negative   Leuk Esterase: Large / RBC: 8 /hpf / WBC 1145 /HPF   Sq Epi: x / Non Sq Epi: 1 /hpf / Bacteria: Moderate           @ 20:50  5.2  57              Consultant(s) Notes Reviewed:      Care Discussed with Consultants/Other Providers:

## 2022-08-28 NOTE — PATIENT PROFILE ADULT - NSTOBACCONEVERSMOKERY/N_GEN_A
[Normal Growth] : growth [Normal Development] : development [None] : No known medical problems [No Elimination Concerns] : elimination [No Feeding Concerns] : feeding [No Skin Concerns] : skin [Normal Sleep Pattern] : sleep [School Readiness] : school readiness [Mental Health] : mental health [Nutrition and Physical Activity] : nutrition and physical activity [Oral Health] : oral health [Safety] : safety [No Medications] : ~He/She~ is not on any medications [Parent/Guardian] : parent/guardian [FreeTextEntry1] : 7 year female here for well-visit, appropriate growth and development observed. Continue balanced diet with all food groups. Brush teeth twice a day with toothbrush. Recommend visit to dentist. As per car seat 's guidelines, use foward-facing booster seat until child reaches highest weight/height for seat. Child needs to ride in a belt-positioning booster seat until  4 feet 9 inches has been reached and are between 8 and 12 years of age. Put child to sleep in own bed. Help child to maintain consistent daily routines and sleep schedule.  discussed. Ensure home is safe. Teach child about personal safety. Use consistent, positive discipline. Read aloud to child. Limit screen time to no more than 2 hours per day.\par Return 1 year for routine well child check.\par \par VIS sheets given for appropriate vaccines. Discussed common side effects.\par Quadracel given in left arm (DTaP/IPV) and tolerated well.\par \par Continue to follow up with dermatology. Yes

## 2022-08-28 NOTE — PROGRESS NOTE ADULT - ASSESSMENT
89    year old female    h/o hemorrhagic CVA, has  PEG,  HTN, MI,   HLD, gout, right ca  breast   right Ca breast. s/p mastectomy in 2019,  s/p  sentinel node  localization.  4/4,  were  negative  peg dislodged.  IR   replacements  on 1/3/22  s/p rrt  . in past,  for hypoxia. cxr with complete  collapsed  of  left lung, needing broch,   s/ p  bronchoscopy , pt  with  tracheomalacia  and  collapsed  airways,  makes  this a  difficult  situation, as there is no good rx for this     h/o bacteremia. on   pna, perforated  appendicitis,  ?  mets  to  acetabulum, was  seen by dr pacheco   surg/ IR  and  oncology eval dr pacheco   sa w pt.  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 this will not alter rx. pt  will need   extended  course  of ab   on iv  vanco and ertapenem.  till  2/9/.22  ID dr reagan, and per  surg  and  IR  eval,  no intervention   CT  1/20/22, no PE. collection in right side   s/p  rectal bleed.        *  admitted with sob    ENT eval w/ laryngoscopy notable for vocal cords mobile and intact, no edema, upper airway patent  on cpap   Duonebs and hypertonic saline for airway clearance    Zosyn empiric for pna    *   s/p cva, has  PEG,  npo  ,  on  synthroid, Keppra  ,  *  HTN, on meds  per  card  *  h/o ca breast. /, s/p  R  mastectomy  * obesity, bmi  was   41, now  is  34 in  er  left  leg contracture ,  remains  bed  bound. functional  paraplegias  needs  assistance  for  all her  adl's  * on Proventil,   * pt  with  h/o  tracheomalacia  and  collapsed  airways,  given pt;s  mlple co morbidities,  pt is  at risk for  re admissions   on nocturnal  bipap  difficult  situation, a s there  is no  good  rx  for  pt's  recurrent lung collapse hypoxia  lc/c   mild  tachycardia      re  admission  likely,, given her  airways, and  propensity  for  lung collapse   pt  is  oxygen dependent  per  son, pt is  full code     rad< from: CT Angio Chest PE Protocol w/ IV Cont (01.20.22 @ 10:35) >  IMPRESSION:  Limited exam for pulmonary embolism; no central pulmonary embolism with   nondiagnostic evaluation of lobar, segmental and subsegmental branches.   No imaging evidence of right heart strain.  Complete lingular and left lower lobe collapse withsuperimposed   bronchial impaction. Small left pleural effusion. Mild patchy right   apical groundglass may be related to pulmonary edema, infection or   inflammation.  Multilocular 4.0 x 2.2 cm right lower quadrant fluid collection in the   regionof previously perforated appendix, status post drain removal.   Periappendiceal fat stranding has largely resolved.  Left PICC crosses midline and terminates in the right innominate vein,   likely repositioned during the administration of contrast between    imaging and CTA acquisition.  Cholelithiasis and choledocholithiasis. No intrahepatic biliary ductal   dilatation.  --- End of Report ---  < end of copied text >                    89    year old female    h/o hemorrhagic CVA, has  PEG,  HTN, MI,   HLD, gout, right ca  breast   right Ca breast. s/p mastectomy in 2019,  s/p  sentinel node  localization.  4/4,  were  negative  peg dislodged.  IR   replacements  on 1/3/22  s/p rrt  . in past,  for hypoxia. cxr with complete  collapsed  of  left lung, needing broch,   s/ p  bronchoscopy , pt  with  tracheomalacia  and  collapsed  airways,  makes  this a  difficult  situation, as there is no good rx for this     h/o bacteremia. on   pna, perforated  appendicitis,  ?  mets  to  acetabulum, was  seen by dr pacheco   surg/ IR  and  oncology eval dr pacheco   sa w pt.  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 this will not alter rx. pt  will need   extended  course  of ab   on iv  vanco and ertapenem.  till  2/9/.22  ID dr reagan, and per  surg  and  IR  eval,  no intervention   CT  1/20/22, no PE. collection in right side   s/p  rectal bleed.        *  admitted with sob    ENT eval w/ laryngoscopy notable for vocal cords mobile and intact, no edema, upper airway patent   Duonebs and hypertonic saline for airway clearance    Zosyn empiric for pna    *   s/p cva, has  PEG,  npo  ,  on  synthroid, Keppra  ,  *  HTN, on meds  per  card  *  h/o  ca breast. /, s/p  R  mastectomy  * obesity, bmi  was   41, now  is  32       c/c left  leg contracture ,  remains  bed  bound. functional  paraplegias  needs  assistance  for  all her  adl's   * pt  with  h/o  tracheomalacia  and  collapsed  airways,    given pt;s  mlple co morbidities,  pt is  at risk for  re admissions   on nocturnal  bipap  difficult  situation, a s there  is no  good  rx  for  pt's  recurrent lung collapse hypoxia    h/o  of  chronic    mild  tachycardia      re  admission  likely,, given her  airways, and  propensity  for  lung collapse   pt  is  oxygen dependent around  aidan clock/  on bipap now   check  bladder scan   start feeds  when off bipap/ pulm to f/p/  discussed  with team    per  son, pt is  full code     rad< from: CT Angio Chest PE Protocol w/ IV Cont (01.20.22 @ 10:35) >  IMPRESSION:  Limited exam for pulmonary embolism; no central pulmonary embolism with   nondiagnostic evaluation of lobar, segmental and subsegmental branches.   No imaging evidence of right heart strain.  Complete lingular and left lower lobe collapse withsuperimposed   bronchial impaction. Small left pleural effusion. Mild patchy right   apical groundglass may be related to pulmonary edema, infection or   inflammation.  Multilocular 4.0 x 2.2 cm right lower quadrant fluid collection in the   regionof previously perforated appendix, status post drain removal.   Periappendiceal fat stranding has largely resolved.  Left PICC crosses midline and terminates in the right innominate vein,   likely repositioned during the administration of contrast between    imaging and CTA acquisition.  Cholelithiasis and choledocholithiasis. No intrahepatic biliary ductal   dilatation.  --- End of Report ---  < end of copied text >

## 2022-08-28 NOTE — PROGRESS NOTE ADULT - SUBJECTIVE AND OBJECTIVE BOX
***************************************************************  Jaylen Gastelum, PGY1  Internal Medicine   pager:  [facility]: [phone] [facility]: [phone]  ***************************************************************    FRANKI MEHTA  87y  MRN: [id number]    Patient is a 87y old  Female who presents with a chief complaint of SOB (28 Aug 2022 04:43)      Interval/Overnight Events: no events ON.     Subjective: Pt seen and examined at bedside. Denies fever, CP, SOB, abn pain, N/V, dysuria. Tolerating diet.      MEDICATIONS  (STANDING):  albuterol/ipratropium for Nebulization 3 milliLiter(s) Nebulizer every 6 hours  allopurinol 100 milliGRAM(s) Oral daily  atorvastatin 20 milliGRAM(s) Oral at bedtime  cyanocobalamin 1000 MICROGram(s) Oral daily  diltiazem    Tablet 30 milliGRAM(s) Oral three times a day  enoxaparin Injectable 40 milliGRAM(s) SubCutaneous every 24 hours  escitalopram 10 milliGRAM(s) Oral daily  levETIRAcetam  Solution 750 milliGRAM(s) Oral two times a day  levothyroxine 75 MICROGram(s) Oral daily  multivitamin 1 Tablet(s) Oral daily  piperacillin/tazobactam IVPB.. 3.375 Gram(s) IV Intermittent every 8 hours  sodium chloride 3%  Inhalation 4 milliLiter(s) Inhalation every 12 hours  vancomycin  IVPB 1000 milliGRAM(s) IV Intermittent every 12 hours    MEDICATIONS  (PRN):  acetaminophen    Suspension .. 650 milliGRAM(s) Oral every 6 hours PRN Temp greater or equal to 38C (100.4F), Mild Pain (1 - 3)  traMADol 50 milliGRAM(s) Oral two times a day PRN Severe Pain (7 - 10)      Objective:    Vitals: Vital Signs Last 24 Hrs  T(C): 36.7 (22 @ 05:13), Max: 37.2 (22 @ 19:45)  T(F): 98.1 (22 @ 05:13), Max: 98.9 (22 @ 19:45)  HR: 105 (22 @ 06:28) (88 - 106)  BP: 117/98 (22 @ 06:28) (107/81 - 127/96)  BP(mean): --  RR: 25 (22 @ 05:13) (22 - 27)  SpO2: 100% (22 @ 05:13) (96% - 100%)                I&O's Summary      PHYSICAL EXAM:  GENERAL: NAD  HEAD:  Atraumatic, Normocephalic  EYES: EOMI, conjunctiva and sclera clear  CHEST/LUNG: Clear to auscultation bilaterally; No rales, rhonchi, wheezing, or rubs  HEART: Regular rate and rhythm; No murmurs, rubs, or gallops  ABDOMEN: Soft, Nontender, Nondistended;   SKIN: No rashes or lesions  NERVOUS SYSTEM:  Alert & Oriented X3, no focal deficits    LABS:      130<L>  |  93<L>  |  44<H>  ----------------------------<  128<H>  5.7<H>   |  20<L>  |  0.69      132<L>  |  94<L>  |  47<H>  ----------------------------<  226<H>  5.3   |  21<L>  |  0.78      131<L>  |  94<L>  |  44<H>  ----------------------------<  170<H>  5.8<H>   |  22  |  0.69    Ca    9.8      28 Aug 2022 05:23  Ca    9.9      27 Aug 2022 23:35  Ca    9.8      27 Aug 2022 21:06  Phos  4.7       Mg     2.3         TPro  7.6  /  Alb  4.0  /  TBili  0.2  /  DBili  x   /  AST  37  /  ALT  16  /  AlkPhos  137<H>    TPro  7.7  /  Alb  4.1  /  TBili  0.2  /  DBili  x   /  AST  29  /  ALT  16  /  AlkPhos  137<H>                      Urinalysis Basic - ( 27 Aug 2022 22:42 )    Color: Light Orange / Appearance: Turbid / S.013 / pH: x  Gluc: x / Ketone: Negative  / Bili: Negative / Urobili: Negative   Blood: x / Protein: 30 mg/dL / Nitrite: Negative   Leuk Esterase: Large / RBC: 8 /hpf / WBC 1145 /HPF   Sq Epi: x / Non Sq Epi: 1 /hpf / Bacteria: Moderate                              9.6    13.93 )-----------( 296      ( 28 Aug 2022 05:23 )             30.6                         10.1   14.46 )-----------( 298      ( 27 Aug 2022 21:06 )             31.8     CAPILLARY BLOOD GLUCOSE          RADIOLOGY & ADDITIONAL TESTS:    Imaging Personally Reviewed:  [x ] YES  [ ] NO    Consultants involved in case:   Consultant(s) Notes Reviewed:  [ x] YES  [ ] NO:   Care Discussed with Consultants/Other Providers [x ] YES  [ ] NO

## 2022-08-28 NOTE — H&P ADULT - PROBLEM SELECTOR PLAN 1
- Dyspnea and increased WOB c/f underlying pna vs atelectasis d/t being off positive pressure ventilation  - ENT eval w/ laryngoscopy notable for vocal cords mobile and intact, no edema, upper airway patent  - c/w CPAP for now to assist w/ airway patency   - Vanco/Zosyn empiric for pna  - f/u MRSA swab, dc vanco if negative  - f/u blood and Ur cx and sputum cx, downtitrate abx based off sensitivities  - Consider pulm eval in AM (Dr. Kennedy Marcano familiar w/ case) - Dyspnea and increased WOB c/f underlying pna vs atelectasis d/t being off positive pressure ventilation  - ENT eval w/ laryngoscopy notable for vocal cords mobile and intact, no edema, upper airway patent  - c/w CPAP for now to assist w/ airway patency and consider changing to nocturnal  - Duonebs and hypertonic saline for airway clearance  - Vanco/Zosyn empiric for pna  - f/u MRSA swab, dc vanco if negative  - f/u blood and Ur cx and sputum cx, downtitrate abx based off sensitivities  - Consider pulm eval in AM (Dr. Kennedy Marcano familiar w/ case)

## 2022-08-28 NOTE — ED ADULT NURSE REASSESSMENT NOTE - NS ED NURSE REASSESS COMMENT FT1
Pt A+Ox3, tolerating BIPAP appropriately. Pt admitted to medicine for dyspnea, pending admission bed.

## 2022-08-28 NOTE — H&P ADULT - NSICDXPASTMEDICALHX_GEN_ALL_CORE_FT
PAST MEDICAL HISTORY:  CVA (cerebral vascular accident)     ETOH abuse     Gout     HLD (hyperlipidemia)     HTN (hypertension)     MI (myocardial infarction)     Personal history of asbestosis     Tracheobronchomalacia     UTI (lower urinary tract infection)

## 2022-08-28 NOTE — PATIENT PROFILE ADULT - FUNCTIONAL ASSESSMENT - BASIC MOBILITY 6.
1-calculated by average/Not able to assess (calculate score using [facility] averaging method)

## 2022-08-28 NOTE — CONSULT NOTE ADULT - SUBJECTIVE AND OBJECTIVE BOX
CHIEF COMPLAINT:Patient is a 87y old  Female who presents with a chief complaint of SOB (28 Aug 2022 07:18)      HPI:  Pt is an 87yo F w/ PMH of severe tracheobronchomalacia, HTN, HLD, CAD, CVA w/ residual L sided weakness, Intrinsic lung disease, Aortic stenosis, R breast CA s/p mastectomy, L lung collapse and MRSE p/w dyspnea from rehab.   Unable to obtain further history from patient d/t dyspnea and use of CPAP. Remained of history provided by chart review and collateral from pt's son.  She has a previous hx of resp failure d/t mucus plugging w/ complete L lung collapse and severe tracheobronchomalacia requiring hospitalization at [facility] in March 2022. After discharge to rehab pt was maintained on BiPAP for about one month's time during the evening hours but was subsequently taken off. She was reportedly doing well until until started complaining of an inability to breath and having increased WOB prompting her ED visit.   In the ED she was noted to be tachycardic and tachypneic w/ labs significant for a leukocytosis and positive UA. CXR w/ LLL opacity vs atelectasis. She was administered Vanc, Zosyn, Tylenol and Duoneb treatments and cultures were sent to r/o infectious etiology.      PAST MEDICAL & SURGICAL HISTORY:  MI (myocardial infarction)  Personal history of asbestosis  Gout  UTI (lower urinary tract infection)  ETOH abuse  CVA (cerebral vascular accident)  Tracheobronchomalacia  HTN (hypertension)  HLD (hyperlipidemia)  History of left shoulder fracture  History of benign breast tumor    MEDICATIONS  (STANDING):  albuterol/ipratropium for Nebulization 3 milliLiter(s) Nebulizer every 6 hours  allopurinol 100 milliGRAM(s) Oral daily  atorvastatin 20 milliGRAM(s) Oral at bedtime  cyanocobalamin 1000 MICROGram(s) Oral daily  diltiazem    Tablet 30 milliGRAM(s) Oral three times a day  enoxaparin Injectable 40 milliGRAM(s) SubCutaneous every 24 hours  escitalopram 10 milliGRAM(s) Oral daily  levETIRAcetam  Solution 750 milliGRAM(s) Oral two times a day  levothyroxine 75 MICROGram(s) Oral daily  multivitamin 1 Tablet(s) Oral daily  piperacillin/tazobactam IVPB.. 3.375 Gram(s) IV Intermittent every 8 hours  sodium chloride 3%  Inhalation 4 milliLiter(s) Inhalation every 12 hours  vancomycin  IVPB 1000 milliGRAM(s) IV Intermittent every 12 hours    MEDICATIONS  (PRN):  acetaminophen    Suspension .. 650 milliGRAM(s) Oral every 6 hours PRN Temp greater or equal to 38C (100.4F), Mild Pain (1 - 3)  traMADol 50 milliGRAM(s) Oral two times a day PRN Severe Pain (7 - 10)      FAMILY HISTORY:  No pertinent family history in first degree relatives        SOCIAL HISTORY:    [ ] Non-smoker  [ ] Smoker  [ ] Alcohol    Allergies    Toradol (Urticaria (Mild to Mod); Rash (Mild to Mod))    Intolerances    	    REVIEW OF SYSTEMS:  CONSTITUTIONAL: No fever, weight loss, or fatigue  EYES: No eye pain, visual disturbances, or discharge  ENT:  No difficulty hearing, tinnitus, vertigo; No sinus or throat pain  NECK: No pain or stiffness  RESPIRATORY: No cough, wheezing, chills or hemoptysis; + Shortness of Breath  CARDIOVASCULAR: No chest pain, palpitations, passing out, dizziness, or leg swelling  GASTROINTESTINAL: No abdominal or epigastric pain. No nausea, vomiting, or hematemesis; No diarrhea or constipation. No melena or hematochezia.  GENITOURINARY: No dysuria, frequency, hematuria, or incontinence  NEUROLOGICAL: No headaches, memory loss, loss of strength, numbness, or tremors  SKIN: No itching, burning, rashes, or lesions   LYMPH Nodes: No enlarged glands  ENDOCRINE: No heat or cold intolerance; No hair loss  MUSCULOSKELETAL: No joint pain or swelling; No muscle, back, or extremity pain  PSYCHIATRIC: No depression, anxiety, mood swings, or difficulty sleeping  HEME/LYMPH: No easy bruising, or bleeding gums  ALLERGY AND IMMUNOLOGIC: No hives or eczema	    [ ] All others negative	  [ x] Unable to obtain    PHYSICAL EXAM:  T(C): 36.7 (08-28-22 @ 08:42), Max: 37.2 (08-27-22 @ 19:45)  HR: 100 (08-28-22 @ 09:13) (88 - 111)  BP: 126/109 (08-28-22 @ 08:42) (107/81 - 127/96)  RR: 18 (08-28-22 @ 08:42) (18 - 27)  SpO2: 98% (08-28-22 @ 09:13) (96% - 100%)  Wt(kg): --  I&O's Summary      Appearance: Normal	  HEENT:   Normal oral mucosa, PERRL, EOMI	  Lymphatic: No lymphadenopathy  Cardiovascular: Normal S1 S2, No JVD, + murmurs, No edema  Respiratory: rhonchi  Gastrointestinal:  Soft, Non-tender, + BS	  Skin: No rashes, No ecchymoses, No cyanosis	  Neurologic: Non-focal  Extremities: Normal range of motion, No clubbing, cyanosis or edema  Vascular: Peripheral pulses palpable 2+ bilaterally    TELEMETRY: 	    ECG:  	  RADIOLOGY:  OTHER: 	  	  LABS:	 	    CARDIAC MARKERS:                              9.6    13.93 )-----------( 296      ( 28 Aug 2022 05:23 )             30.6     08-28    132<L>  |  94<L>  |  44<H>  ----------------------------<  141<H>  4.9   |  22  |  0.72    Ca    9.7      28 Aug 2022 06:51  Phos  4.7     08-28  Mg     2.3     08-28    TPro  7.6  /  Alb  4.0  /  TBili  0.2  /  DBili  x   /  AST  37  /  ALT  16  /  AlkPhos  137<H>  08-28    proBNP: Serum Pro-Brain Natriuretic Peptide: 155 pg/mL (08-27 @ 21:06)    Lipid Profile:   HgA1c:   TSH:       PREVIOUS DIAGNOSTIC TESTING:    < from: 12 Lead ECG (03.16.22 @ 19:38) >  Diagnosis Line SINUS RHYTHM WITH 1ST DEGREE A-V BLOCK WITH PREMATURE SUPRAVENTRICULAR COMPLEXES  INFERIOR INFARCT (CITED ON OR BEFORE 20-OCT-2018)  ABNORMAL ECG  WHEN COMPARED WITH ECG OF 20-JAN-2022 08:25,  SIGNIFICANT CHANGES HAVE OCCURRED    < from: TTE with Doppler (w/Cont) (01.21.22 @ 14:08) >  Mitral Valve: Normal mitral valve.  Aortic Valve/Aorta: Calcified trileaflet aortic valve with  decreased opening. Mild aortic regurgitation.  Aortic Root: 3.3 cm.  LVOT diameter: 2.2 cm.  Left Atrium: Normal left atrium.  Left Ventricle: Hyperdynamic left ventricular systolic  function.   Endocardial visualization enhanced with  intravenous injection of Ultrasonic Enhancing Agent  (Definity). Concentric left ventricular hypertrophy.  Right Heart: Normal right atrium. The right ventricle is  not well visualized; grossly normal right ventricular  systolic function. Normal tricuspid valve.  Pericardium/Pleura: Normal pericardium with no pericardial  effusion.  ------------------------------------------------------------------------  Conclusions:  1. Concentric left ventricular hypertrophy.  2. Hyperdynamic left ventricular systolic function.  Endocardial visualization enhanced with intravenous  injection of Ultrasonic Enhancing Agent (Definity).  3. The right ventricle is not well visualized; grossly  normal right ventricular systolic function.  Pt refused to proceed with exam.  Limited Doppler study.  No trans aortic gradients.  Unable to rule out endocarditis.    < from: CT Chest No Cont (01.31.22 @ 13:55) >  Persistent left lower lobe collapse, lingular collapse and partial left   upper lobe collapse when compared with previous exam. There is mild   progression since the previous study.      Stable patchy groundglass opacities of the right lung apex, suggestive of   infection versus inflammation.    < from: Xray Chest 1 View- PORTABLE-Urgent (Xray Chest 1 View- PORTABLE-Urgent .) (08.27.22 @ 20:50) >  Patchy left lower lung opacity, likely atelectasis. Possible small left   pleural effusion.

## 2022-08-28 NOTE — H&P ADULT - PROBLEM SELECTOR PLAN 8
DVT ppx: Lovenox  Diet: NPO while on BiPAP  Dispo: Pending clinical improvement - c/w allopurinol 100mg QD

## 2022-08-28 NOTE — CONSULT NOTE ADULT - ASSESSMENT
Pt is an 85yo F w/ PMH of severe tracheobronchomalacia, HTN, HLD, CAD, CVA w/ residual L sided weakness, Intrinsic lung disease, Aortic stenosis, R breast CA s/p mastectomy, L lung collapse and MRSE p/w dyspnea from rehab.   Unable to obtain further history from patient d/t dyspnea and use of CPAP. Remained of history provided by chart review and collateral from pt's son.  She has a previous hx of resp failure d/t mucus plugging w/ complete L lung collapse and severe tracheobronchomalacia requiring hospitalization at [facility] in March 2022. After discharge to rehab pt was maintained on BiPAP for about one month's time during the evening hours but was subsequently taken off. She was reportedly doing well until until started complaining of an inability to breath and having increased WOB prompting her ED visit.   In the ED she was noted to be tachycardic and tachypneic w/ labs significant for a leukocytosis and positive UA. CXR w/ LLL opacity vs atelectasis. She was administered Vanc, Zosyn, Tylenol and Duoneb treatments and cultures were sent to r/o infectious etiology.  pt is well known to me with hx of htn, tracheomalacia with multiple admission with l lung collapse, htn, cad, chf diastolic dysfunction with perforated appendicitis.  sob ?to lung collapse increase o2 / neb  ct chest no contrast  continue po Cardizem  MODERATE AS, continue to follow, no need to repeat echo  pt with hx of ASHD/ MI, ?asa daily, pt with hx of GI bleed on the last admission  dvt prophylaxis

## 2022-08-28 NOTE — H&P ADULT - NSHPPHYSICALEXAM_GEN_ALL_CORE
Vital Signs Last 24 Hrs  T(C): 36.7 (28 Aug 2022 05:13), Max: 37.2 (27 Aug 2022 19:45)  T(F): 98.1 (28 Aug 2022 05:13), Max: 98.9 (27 Aug 2022 19:45)  HR: 102 (28 Aug 2022 05:13) (88 - 106)  BP: 107/81 (28 Aug 2022 05:13) (107/81 - 127/96)  RR: 25 (28 Aug 2022 05:13) (22 - 27)  SpO2: 100% (28 Aug 2022 05:13) (96% - 100%)    CONSTITUTIONAL: Well-groomed, in no apparent distress  EYES: No conjunctival or scleral injection, non-icteric;   ENMT: No external nasal lesions; MMM  NECK: Trachea midline without palpable neck mass; thyroid not enlarged and non-tender  RESPIRATORY: Breathing comfortably w/ CPAP mask on; no dullness to percussion; lung exam limited to anterior chest   CARDIOVASCULAR: Tachycardic. +S1S2, no M/G/R; pedal pulses full and symmetric; no lower extremity edema  GASTROINTESTINAL: No palpable masses or tenderness, +BS throughout, no rebound/guarding; no hepatosplenomegaly; no hernia palpated. + PEG tube  LYMPHATIC: No cervical LAD or tenderness  SKIN: No rashes or ulcers noted  NEUROLOGIC: CN II-XII intact; sensation intact in LEs b/l to light touch  PSYCHIATRIC: A&Ox1-2 though may be limited d/t soft voice w/ CPAP on; mood and affect appropriate; appropriate insight and judgment

## 2022-08-28 NOTE — ED ADULT NURSE REASSESSMENT NOTE - NS ED NURSE REASSESS COMMENT FT1
Pt A+Ox2, admitted to medicine for dyspnea, and tolerating BIPAP appropriately. Report given to JASPAL Hernandez on [address].

## 2022-08-28 NOTE — PROGRESS NOTE ADULT - PROBLEM SELECTOR PLAN 1
- Dyspnea and increased WOB c/f underlying pna vs atelectasis d/t being off positive pressure ventilation  - ENT eval w/ laryngoscopy notable for vocal cords mobile and intact, no edema, upper airway patent  - c/w CPAP for now to assist w/ airway patency and consider changing to nocturnal  - Duonebs and hypertonic saline for airway clearance  - Vanco/Zosyn empiric for pna  - f/u MRSA swab, dc vanco if negative  - f/u blood and Ur cx and sputum cx, downtitrate abx based off sensitivities  - Consider pulm eval in AM (Dr. Kennedy Marcano familiar w/ case) - Dyspnea and increased WOB c/f underlying pna vs atelectasis d/t being off positive pressure ventilation  - ENT eval w/ laryngoscopy notable for vocal cords mobile and intact, no edema, upper airway patent  - c/w CPAP for now to assist w/ airway patency and consider changing to nocturnal  - Duonebs and hypertonic saline for airway clearance  - Vanco/Zosyn empiric for PNA  - f/u MRSA swab, dc vanco if negative  - f/u blood and Ur cx and sputum cx, downtitrate abx based off sensitivities  - pulm eval, possible bronch ( discussed w/ Dr. Kennedy Marcano)

## 2022-08-28 NOTE — PROGRESS NOTE ADULT - ASSESSMENT
Pt is an 87yo F w/ PMH of severe tracheobronchomalacia, HTN, HLD, CAD, CVA w/ residual L sided weakness, Intrinsic lung disease, Aortic stenosis, R breast CA s/p mastectomy, L lung collapse and MRSE p/w dyspnea from rehab likely i/s/o PNA vs atelectasis d/t mucus plugging all i/s/o tracheobronchomalacia.

## 2022-08-28 NOTE — PATIENT PROFILE ADULT - FALL HARM RISK - HARM RISK INTERVENTIONS

## 2022-08-28 NOTE — H&P ADULT - PROBLEM SELECTOR PLAN 3
- c/w Diltiazem 30mg TID  - Artifact on ECG appears sinus, though read Afib RVR, will obtain repeat ECG  - if Afib, would be new as no prior documentation and would consider AC - f/u Ur studies  - Serum osoms ordered w/ repeat BMP given hemolysis

## 2022-08-28 NOTE — H&P ADULT - NSHPLABSRESULTS_GEN_ALL_CORE
LABS:                      10.1   14.46 )-----------( 298      ( 27 Aug 2022 21:06 )             31.8         132<L>  |  94<L>  |  47<H>  ----------------------------<  226<H>  5.3   |  21<L>  |  0.78    Ca    9.9      27 Aug 2022 23:35    TPro  7.7  /  Alb  4.1  /  TBili  0.2  /  DBili  x   /  AST  29  /  ALT  16  /  AlkPhos  137<H>      LIVER FUNCTIONS - ( 27 Aug 2022 21:06 )  Alb: 4.1 g/dL / Pro: 7.7 g/dL / ALK PHOS: 137 U/L / ALT: 16 U/L / AST: 29 U/L / GGT: x           Urinalysis Basic - ( 27 Aug 2022 22:42 )  Color: Light Orange / Appearance: Turbid / S.013 / pH: x  Gluc: x / Ketone: Negative  / Bili: Negative / Urobili: Negative   Blood: x / Protein: 30 mg/dL / Nitrite: Negative   Leuk Esterase: Large / RBC: 8 /hpf / WBC 1145 /HPF   Sq Epi: x / Non Sq Epi: 1 /hpf / Bacteria: Moderate    IMAGING:  Xray Chest 1 View- PORTABLE-Urgent (22 @ 20:50)    IMPRESSION:  - Patchy left lower lung opacity, likely atelectasis. Possible small left pleural effusion.  ******PRELIMINARY REPORT******

## 2022-08-28 NOTE — H&P ADULT - PROBLEM SELECTOR PLAN 4
- c/w Atorvastatin 20mg qhs - c/w Diltiazem 30mg TID  - Artifact on ECG appears sinus, though read Afib RVR, will obtain repeat ECG  - if Afib, would be new as no prior documentation and would consider AC

## 2022-08-28 NOTE — PROGRESS NOTE ADULT - PROBLEM SELECTOR PLAN 4
- c/w Diltiazem 30mg TID  - Artifact on ECG appears sinus, though read Afib RVR, will obtain repeat ECG  - if Afib, would be new as no prior documentation and would consider AC

## 2022-08-28 NOTE — CONSULT NOTE ADULT - SUBJECTIVE AND OBJECTIVE BOX
[facility] - [facility] - CONSULT NOTE    CHART REVIEW - PATIENT TO BE SEEN     HPI: Asked by the [facility] housestaff to evaluate the patient in pulmonary consultation. The patient is a 87 year old gentlewoman, lifelong non-smoker, well known to me without history of intrinsic lung disease. 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 She also has a history of HTN, HLD, CAD s/p MI with preserved LVEF and moderate aortic stenosis. The patient was admitted to [place] in 2021 with fever and abdominal pain. She was found to have perforated appendicitis with abscess formation. She was treated with tube drainage and antibiotics for a polymicrobial infection. Admission CT had debris within the left lobar and more distal airways of the left lower lobe as well as some debris within the right lower lobe airway - there was complete atelectasis of the left lower lobe and subsegmental atelectatic changes within the left upper lobe and dependent portions of the right lung. Hospital course was complicated by respiratory failure due to mucous plugging with complete collapse of the left lung. She underwent bronchoscopy with removal of copious amounts of purulent secretions from throughout the bronchial tree - there was severe tracheobronchomalacia. She was treated with bronchodilators, mucolytic nebs, chest vest and nocturnal AVAPS with expansion of the left upper lobe and some reexpansion of the left lower lobe. She was discharged to rehab on a 3lpm nasal canula with plans to continue medical and mechanical therapy as well as nocturnal BIPAP. She has been doing "well" at rehab and reports that she has been out of bed and into the chair. It does not appear that she has been wearing nocturnal NIV. She was admitted in March with hematochezia with a surprisingly stable respiratory status. The left lower lobe was well aerated on a CT scan of the abdomen and pelvis with only bibasilar subsegmental atelectasis - there were scattered sigmoid diverticulosis without evidence of diverticulitis - interval decrease in size of a fluid collection in the region of the previously perforated appendicitis measuring 2.8 x 1.5 cm previously measuring 4.0 x 2.2 cm - slight increase in mild adjacent inflammatory change. The GI bleed was treated conservatively. She was discharged back to [facility] off NIV and on albuterol/atrovent/hypertonic saline nebs She returns with shortness of breath in the setting of back pain. ENT evaluation -> no upper airway pathology. The patient has been placed on BIPAP. Asked to evaluate    PMHX:  Tracheobronchomalacia  Mucous plugging with left lung atelectasis  Asbestos exposure  HTN (hypertension)  HLD (dyslipidemia)  CAD (coronary artery disease)  MI (myocardial infarction)  Aortic stenosis  CVA (cerebral vascular accident) - left sided weakness - dysphagia  Gout  UTI (lower urinary tract infection)  Breast cancer    PSHX:  Bronchoscopy - multiple  Shoulder fracture repair - left  Mastectomy - right - 2019    FAMILY HISTORY:  No pertinent family history in first degree relatives    SOCIAL HISTORY:  lifelong non-smoker; former EtOH overuse; bedbound; has been residing at [facility]    Pulmonary Medications:   albuterol/ipratropium for Nebulization 3 milliLiter(s) Nebulizer every 6 hours  guaiFENesin Oral Liquid (Sugar-Free) 300 milliGRAM(s) Oral every 6 hours  sodium chloride 3%  Inhalation 4 milliLiter(s) Inhalation every 12 hours      Antimicrobials:  piperacillin/tazobactam IVPB.. 3.375 Gram(s) IV Intermittent every 8 hours      Cardiology:  diltiazem    Tablet 30 milliGRAM(s) Oral three times a day      Other:  allopurinol 100 milliGRAM(s) Oral daily  atorvastatin 20 milliGRAM(s) Oral at bedtime  cyanocobalamin 1000 MICROGram(s) Oral daily  enoxaparin Injectable 40 milliGRAM(s) SubCutaneous every 24 hours  escitalopram 10 milliGRAM(s) Oral daily  levETIRAcetam  Solution 750 milliGRAM(s) Oral two times a day  levothyroxine 75 MICROGram(s) Oral daily      Prn:  MEDICATIONS  (PRN):  acetaminophen    Suspension .. 650 milliGRAM(s) Oral every 6 hours PRN Temp greater or equal to 38C (100.4F), Mild Pain (1 - 3)  traMADol 50 milliGRAM(s) Oral two times a day PRN Severe Pain (7 - 10)      Allergies    Toradol (Urticaria (Mild to Mod); Rash (Mild to Mod))    HOME MEDICATIONS: see  H & P    REVIEW OF SYSTEMS:  Constitutional: As per HPI  HEENT: Within normal limits  CV: As per HPI  Resp: As per HPI  GI: Within normal limits   : Within normal limits  Musculoskeletal: Within normal limits  Skin: Within normal limits  Neurological: Within normal limits  Psychiatric: Within normal limits  Endocrine: Within normal limits  Hematologic/Lymphatic: Within normal limits  Allergic/Immunologic: Within normal limits    [x] All other systems negative    OBJECTIVE:    Daily Height in cm: 162.56 (28 Aug 2022 15:12)      PHYSICAL EXAM:  ICU Vital Signs Last 24 Hrs  T(C): 36.9 (28 Aug 2022 15:12), Max: 37.2 (27 Aug 2022 19:45)  T(F): 98.5 (28 Aug 2022 15:12), Max: 98.9 (27 Aug 2022 19:45)  HR: 103 (28 Aug 2022 15:34) (88 - 111)  BP: 124/64 (28 Aug 2022 15:12) (102/70 - 127/96)  BP(mean): --  ABP: --  ABP(mean): --  RR: 20 (28 Aug 2022 15:12) (18 - 27)  SpO2: 98% (28 Aug 2022 15:34) (96% - 100%)    O2 Parameters below as of 28 Aug 2022 15:12  Patient On (Oxygen Delivery Method): BiPAP/CPAP    General: Awake. Alert. Cooperative. No distress. Appears stated age 	  HEENT:   Atraumatic. Normocephalic. Anicteric. Normal oral mucosa. PERRL. EOMI.  Neck: Supple. Trachea midline. Thyroid without enlargement/tenderness/nodules. No carotid bruit. No JVD.	  Cardiovascular: Regular rate and rhythm. S1 S2 normal. No murmurs, rubs or gallops.  Respiratory: Respirations unlabored. Clear to auscultation and percussion bilaterally. No curvature.  Abdomen: Soft. Non-tender. Non-distended. No organomegaly. No masses. Normal bowel sounds.  Extremities: Warm to touch. No clubbing or cyanosis. No pedal edema.  Pulses: 2+ peripheral pulses all extremities.	  Skin: Normal skin color. No rashes or lesions. No ecchymoses. No cyanosis. Warm to touch.  Lymph Nodes: Cervical, supraclavicular and axillary nodes normal  Neurological: Motor and sensory examination equal and normal. A and O x 3  Psychiatry: Appropriate mood and affect.      LABS:                          9.6    13.93 )-----------( 296      ( 28 Aug 2022 05:23 )             30.6     CBC    WBC  13.93 <==, 14.46 <==    Hemoglobin  9.6 <<==, 10.1 <<==    Hematocrit  30.6 <==, 31.8 <==    Platelets  296 <==, 298 <==      132<L>  |  94<L>  |  44<H>  ----------------------------<  141<H>    08-28  4.9   |  22  |  0.72    LYTES    sodium  132 <==, 130 <==, 132 <==, 131 <==    potassium   4.9 <==, 5.7 <==, 5.3 <==, 5.8 <==    chloride  94 <==, 93 <==, 94 <==, 94 <==    carbon dioxide  22 <==, 20 <==, 21 <==, 22 <==    =============================================================================================  RENAL FUNCTION:    Creatinine:   0.72  <<==, 0.69  <<==, 0.78  <<==, 0.69  <<==    BUN:   44 <==, 44 <==, 47 <==, 44 <==    ============================================================================================    calcium   9.7 <==, 9.8 <==, 9.9 <==, 9.8 <==    phos   4.7 <==    mag   2.3 <==    ============================================================================================  LFTs    AST:   37 <== , 29 <==     ALT:  16  <== , 16  <==     AP:  137  <=, 137  <=    Bili:  0.2  <=, 0.2  <=    Venous Blood Gas:   @ 20:50  7.31/48/57/24/86.9  VBG Lactate: 2.5    Serum Pro-Brain Natriuretic Peptide: 155 pg/mL ( @ 21:06)    CARDIAC MARKERS ( 27 Aug 2022 21:06 )  CPK x     /CKMB x     /CKMB Units x        troponin 21 ng/L        MICROBIOLOGY:     Respiratory Viral Panel with COVID-19 by RYAN (22 @ 21:05)   Rapid RVP Result: [facility]   SARS-CoV-2: [facility]  This Respiratory Panel uses polymerase chain reaction (PCR) to detect for   adenovirus; coronavirus (HKU1, NL63, 229E, OC43); human metapneumovirus   (hMPV); human enterovirus/rhinovirus (Entero/RV); influenza A; influenza   A/H1; influenza A/H3; influenza A/H1-2009; influenza B; parainfluenza   viruses 1, 2, 3, 4; respiratory syncytial virus; Mycoplasma pneumoniae;   Chlamydophila pneumoniae; and SARS-CoV-2.     Urinalysis Basic - ( 27 Aug 2022 22:42 )    Color: Light Orange / Appearance: Turbid / S.013 / pH: x  Gluc: x / Ketone: Negative  / Bili: Negative / Urobili: Negative   Blood: x / Protein: 30 mg/dL / Nitrite: Negative   Leuk Esterase: Large / RBC: 8 /hpf / WBC 1145 /HPF   Sq Epi: x / Non Sq Epi: 1 /hpf / Bacteria: Moderate    RADIOLOGY:  [x ] Chest radiographs reviewed and interpreted by me    EXAM:  XR CHEST PORTABLE URGENT 1V                          PROCEDURE DATE:  2022      FINDINGS:    The heart size is not well evaluated in this projection.  Low lung volumes. Patchy left lower lung opacity is again seen, likely   atelectasis.  Possible small left pleural effusion. No pneumothorax.  No acute bony abnormalities. Partially visualized left humeral hardware.    IMPRESSION:    Patchy left lower lung opacity, likely atelectasis. Possible small left   pleural effusion.    MARKO GUAMAN DO; Resident Radiologist  This document has been electronically signed.  PRAVIN CASTILLO MD; Attending Radiologist  This document has been electronically signed. Aug 28 2022 10:09AM  ---------------------------------------------------------------------------------------------------------------         [facility] - [facility] - CONSULT NOTE    HPI: Asked by the [facility] housestaff to evaluate the patient in pulmonary consultation. The patient is a 87 year old gentlewoman, lifelong non-smoker, well known to me without history of intrinsic lung disease. 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 She also has a history of HTN, HLD, CAD s/p MI with preserved LVEF and moderate aortic stenosis. The patient was admitted to [place] in 2021 with fever and abdominal pain. She was found to have perforated appendicitis with abscess formation. She was treated with tube drainage and antibiotics for a polymicrobial infection. Hospital course was complicated by respiratory failure due to mucous plugging with complete collapse of the left lung. She underwent several bronchoscopies for pulmonary toilet and was found to have severe tracheobronchomalacia. She was treated with bronchodilators, mucolytic nebs, chest vest and nocturnal AVAPS with expansion of the left upper lobe and some reexpansion of the left lower lobe. She was admitted in March with hematochezia with a surprisingly stable respiratory status. The left lower lobe was well aerated on a CT scan of the abdomen and pelvis with only bibasilar subsegmental atelectasis - there was scattered sigmoid diverticulosis without evidence of diverticulitis - interval decrease in size of a fluid collection in the region of the previously perforated appendicitis measuring 2.8 x 1.5 cm previously measuring 4.0 x 2.2 cm - slight increase in mild adjacent inflammatory change. The GI bleed was treated conservatively.  She has been doing "well" at rehab although she remains bedbound and NPO. She is no longer wearing nocturnal NIV.  She returns with shortness of breath in the setting of back, neck and left lower extremity pain. ENT evaluation -> no upper airway pathology. The patient had been placed on BIPAP for mild acute hypercapnic respiratory failure that has resolved and to buttress open the airways. She is awake and alert. She currently has no shortness of breath or hypoxemia on room air. No cough, sputum production, hemoptysis, chest congestion or wheeze.  No fevers, chills or sweats. No chest pain/pressure or palpitations. Chest radiographs are abnormal. Asked to evaluate    PMHX:  Tracheobronchomalacia  Mucous plugging with left lung atelectasis  Asbestos exposure  HTN (hypertension)  HLD (dyslipidemia)  CAD (coronary artery disease)  MI (myocardial infarction)  Aortic stenosis  CVA (cerebral vascular accident) - left sided weakness - dysphagia - bedbound  Gout  UTI (lower urinary tract infection)  Breast cancer    PSHX:  Bronchoscopy - multiple  Shoulder fracture repair - left  Mastectomy - right - 2019    FAMILY HISTORY:  No pertinent family history in first degree relatives    SOCIAL HISTORY:  lifelong non-smoker; former EtOH overuse; bedbound; has been residing at [facility]    Pulmonary Medications:   albuterol/ipratropium for Nebulization 3 milliLiter(s) Nebulizer every 6 hours  guaiFENesin Oral Liquid (Sugar-Free) 300 milliGRAM(s) Oral every 6 hours  sodium chloride 3%  Inhalation 4 milliLiter(s) Inhalation every 12 hours      Antimicrobials:  piperacillin/tazobactam IVPB.. 3.375 Gram(s) IV Intermittent every 8 hours      Cardiology:  diltiazem    Tablet 30 milliGRAM(s) Oral three times a day      Other:  allopurinol 100 milliGRAM(s) Oral daily  atorvastatin 20 milliGRAM(s) Oral at bedtime  cyanocobalamin 1000 MICROGram(s) Oral daily  enoxaparin Injectable 40 milliGRAM(s) SubCutaneous every 24 hours  escitalopram 10 milliGRAM(s) Oral daily  levETIRAcetam  Solution 750 milliGRAM(s) Oral two times a day  levothyroxine 75 MICROGram(s) Oral daily      Prn:  MEDICATIONS  (PRN):  acetaminophen    Suspension .. 650 milliGRAM(s) Oral every 6 hours PRN Temp greater or equal to 38C (100.4F), Mild Pain (1 - 3)  traMADol 50 milliGRAM(s) Oral two times a day PRN Severe Pain (7 - 10)      Allergies    Toradol (Urticaria (Mild to Mod); Rash (Mild to Mod))    HOME MEDICATIONS: see  H & P    REVIEW OF SYSTEMS:  Constitutional: As per HPI  HEENT: Within normal limits  CV: As per HPI  Resp: As per HPI  GI: dysphagia  : Within normal limits  Musculoskeletal: Within normal limits  Skin: Within normal limits  Neurological: CVA - left sided plegia  Psychiatric: Within normal limits  Endocrine: Within normal limits  Hematologic/Lymphatic: Within normal limits  Allergic/Immunologic: Within normal limits    [x] All other systems negative    OBJECTIVE:    Daily Height in cm: 162.56 (28 Aug 2022 15:12)      PHYSICAL EXAM:  ICU Vital Signs Last 24 Hrs  T(C): 36.9 (28 Aug 2022 15:12), Max: 37.2 (27 Aug 2022 19:45)  T(F): 98.5 (28 Aug 2022 15:12), Max: 98.9 (27 Aug 2022 19:45)  HR: 103 (28 Aug 2022 15:34) (88 - 111)  BP: 124/64 (28 Aug 2022 15:12) (102/70 - 127/96)  BP(mean): --  ABP: --  ABP(mean): --  RR: 20 (28 Aug 2022 15:12) (18 - 27)  SpO2: 98% (28 Aug 2022 15:34) (91% on room air -> 95% after 5 deep inspirations)    General: Awake and alert. Cooperative. No distress Appears stated age. Bedbound  HEENT: Atraumatic. Normocephalic. Anicteric. Normal oral mucosa. PERRL. EOMI. Mallampati class IV airway.  Neck: Supple. Trachea midline. Thyroid without enlargement/tenderness/nodules. No carotid bruit. No JVD. Short and wide  Cardiovascular: Regular rate and rhythm. S1 S2 normal. III/VI systolic murmur  Respiratory: Respirations unlabored. Left basilar rales. No wheeze. Kyphosis. Poor chest wall excursion  Abdomen: Soft. Non-tender. Non-distended. No organomegaly. No masses. Normal bowel sounds. Obese. PEG.  Extremities: Warm to touch. No clubbing or cyanosis. No pedal edema. Large legs. Left leg contracted under the right leg  Pulses: 2+ peripheral pulses all extremities.	  Skin: Normal skin color. No rashes or lesions. No ecchymoses. No cyanosis. Warm to touch.  Lymph Nodes: Cervical, supraclavicular and axillary nodes normal  Neurological: Left lower extremity plegia with contraction. Left upper extremity is quite weak. A and O x 3  Psychiatry: Appropriate mood and affect.    LABS:                          9.6    13.93 )-----------( 296      ( 28 Aug 2022 05:23 )             30.6     CBC    WBC  13.93 <==, 14.46 <==    Hemoglobin  9.6 <<==, 10.1 <<==    Hematocrit  30.6 <==, 31.8 <==    Platelets  296 <==, 298 <==      132<L>  |  94<L>  |  44<H>  ----------------------------<  141<H>    08-28  4.9   |  22  |  0.72    LYTES    sodium  132 <==, 130 <==, 132 <==, 131 <==    potassium   4.9 <==, 5.7 <==, 5.3 <==, 5.8 <==    chloride  94 <==, 93 <==, 94 <==, 94 <==    carbon dioxide  22 <==, 20 <==, 21 <==, 22 <==    =============================================================================================  RENAL FUNCTION:    Creatinine:   0.72  <<==, 0.69  <<==, 0.78  <<==, 0.69  <<==    BUN:   44 <==, 44 <==, 47 <==, 44 <==    ============================================================================================    calcium   9.7 <==, 9.8 <==, 9.9 <==, 9.8 <==    phos   4.7 <==    mag   2.3 <==    ============================================================================================  LFTs    AST:   37 <== , 29 <==     ALT:  16  <== , 16  <==     AP:  137  <=, 137  <=    Bili:  0.2  <=, 0.2  <=    Blood Gas Profile - Venous (22 @ 22:32)   pH, Venous: 7.36   pCO2, Venous: 47 mmHg   pO2, Venous: 48 mmHg   HCO3, Venous: 27 mmol/L   Base Excess, Venous: 0.6 mmol/L   Oxygen Saturation, Venous: 79.0 %   Total CO2, Venous: 28 mmol/L     Venous Blood Gas:   @ 20:50  7.31/48/57/24/86.9  VBG Lactate: 2.5    Serum Pro-Brain Natriuretic Peptide: 155 pg/mL ( @ 21:06)    CARDIAC MARKERS ( 27 Aug 2022 21:06 )  CPK x     /CKMB x     /CKMB Units x        troponin 21 ng/L        MICROBIOLOGY:     Respiratory Viral Panel with COVID-19 by RYAN (22 @ 21:05)   Rapid RVP Result: [facility]   SARS-CoV-2: [facility]  This Respiratory Panel uses polymerase chain reaction (PCR) to detect for   adenovirus; coronavirus (HKU1, NL63, 229E, OC43); human metapneumovirus   (hMPV); human enterovirus/rhinovirus (Entero/RV); influenza A; influenza   A/H1; influenza A/H3; influenza A/H1-2009; influenza B; parainfluenza   viruses 1, 2, 3, 4; respiratory syncytial virus; Mycoplasma pneumoniae;   Chlamydophila pneumoniae; and SARS-CoV-2.     Urinalysis Basic - ( 27 Aug 2022 22:42 )    Color: Light Orange / Appearance: Turbid / S.013 / pH: x  Gluc: x / Ketone: Negative  / Bili: Negative / Urobili: Negative   Blood: x / Protein: 30 mg/dL / Nitrite: Negative   Leuk Esterase: Large / RBC: 8 /hpf / WBC 1145 /HPF   Sq Epi: x / Non Sq Epi: 1 /hpf / Bacteria: Moderate    MRSA/MSSA PCR (22 @ 05:23)   MRSA PCR Result.: NotDete  Staph aureus PCR Result: NotDetec     RADIOLOGY:  [x ] Chest radiographs reviewed and interpreted by me    EXAM:  XR CHEST PORTABLE URGENT 1V                          PROCEDURE DATE:  2022      FINDINGS:    The heart size is not well evaluated in this projection.  Low lung volumes. Patchy left lower lung opacity is again seen, likely   atelectasis.  Possible small left pleural effusion. No pneumothorax.  No acute bony abnormalities. Partially visualized left humeral hardware.    IMPRESSION:    Patchy left lower lung opacity, likely atelectasis. Possible small left   pleural effusion.    MARKO GUAMAN DO; Resident Radiologist  This document has been electronically signed.  PRAVIN CASTILLO MD; Attending Radiologist  This document has been electronically signed. Aug 28 2022 10:09AM  ---------------------------------------------------------------------------------------------------------------         [facility] - [facility] - CONSULT NOTE    HPI: Asked by the [facility] housestaff to evaluate the patient in pulmonary consultation. The patient is a 87 year old gentlewoman, lifelong non-smoker, well known to me without history of intrinsic lung disease. 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 She also has a history of HTN, HLD, CAD s/p MI with preserved LVEF and moderate aortic stenosis. The patient was admitted to [place] in 2021 with fever and abdominal pain. She was found to have perforated appendicitis with abscess formation. She was treated with tube drainage and antibiotics for a polymicrobial infection. Hospital course was complicated by respiratory failure due to mucous plugging with complete collapse of the left lung. She underwent several bronchoscopies for pulmonary toilet and was found to have severe tracheobronchomalacia. She was treated with bronchodilators, mucolytic nebs, chest vest and nocturnal AVAPS with expansion of the left upper lobe and some reexpansion of the left lower lobe. She was admitted in March with hematochezia with a surprisingly stable respiratory status. The left lower lobe was well aerated on a CT scan of the abdomen and pelvis with only bibasilar subsegmental atelectasis - there was scattered sigmoid diverticulosis without evidence of diverticulitis - interval decrease in size of a fluid collection in the region of the previously perforated appendicitis measuring 2.8 x 1.5 cm previously measuring 4.0 x 2.2 cm - slight increase in mild adjacent inflammatory change. The GI bleed was treated conservatively.  She has been doing "well" at rehab although she remains bedbound and NPO. She is no longer wearing nocturnal NIV.  She returns with shortness of breath in the setting of back, neck and left lower extremity pain. ENT evaluation -> no upper airway pathology. The patient had been placed on BIPAP for mild acute hypercapnic respiratory failure that has resolved and to buttress open the airways. She is awake and alert. She currently has no shortness of breath or hypoxemia on room air. No cough, sputum production, hemoptysis, chest congestion or wheeze. No fevers, chills or sweats. No chest pain/pressure or palpitations. Chest radiographs are abnormal. Asked to evaluate    PMHX:  Tracheobronchomalacia  Mucous plugging with left lung atelectasis  Asbestos exposure  HTN (hypertension)  HLD (dyslipidemia)  CAD (coronary artery disease)  MI (myocardial infarction)  Aortic stenosis  CVA (cerebral vascular accident) - left sided weakness - dysphagia - bedbound  Gout  UTI (lower urinary tract infection)  Breast cancer    PSHX:  Bronchoscopy - multiple  Shoulder fracture repair - left  Mastectomy - right - 2019    FAMILY HISTORY:  No pertinent family history in first degree relatives    SOCIAL HISTORY:  lifelong non-smoker; former EtOH overuse; bedbound; has been residing at [facility]    Pulmonary Medications:   albuterol/ipratropium for Nebulization 3 milliLiter(s) Nebulizer every 6 hours  guaiFENesin Oral Liquid (Sugar-Free) 300 milliGRAM(s) Oral every 6 hours  sodium chloride 3%  Inhalation 4 milliLiter(s) Inhalation every 12 hours      Antimicrobials:  piperacillin/tazobactam IVPB.. 3.375 Gram(s) IV Intermittent every 8 hours      Cardiology:  diltiazem    Tablet 30 milliGRAM(s) Oral three times a day      Other:  allopurinol 100 milliGRAM(s) Oral daily  atorvastatin 20 milliGRAM(s) Oral at bedtime  cyanocobalamin 1000 MICROGram(s) Oral daily  enoxaparin Injectable 40 milliGRAM(s) SubCutaneous every 24 hours  escitalopram 10 milliGRAM(s) Oral daily  levETIRAcetam  Solution 750 milliGRAM(s) Oral two times a day  levothyroxine 75 MICROGram(s) Oral daily      Prn:  MEDICATIONS  (PRN):  acetaminophen    Suspension .. 650 milliGRAM(s) Oral every 6 hours PRN Temp greater or equal to 38C (100.4F), Mild Pain (1 - 3)  traMADol 50 milliGRAM(s) Oral two times a day PRN Severe Pain (7 - 10)      Allergies    Toradol (Urticaria (Mild to Mod); Rash (Mild to Mod))    HOME MEDICATIONS: see  H & P    REVIEW OF SYSTEMS:  Constitutional: As per HPI  HEENT: Within normal limits  CV: As per HPI  Resp: As per HPI  GI: dysphagia  : Within normal limits  Musculoskeletal: Within normal limits  Skin: Within normal limits  Neurological: CVA - left sided plegia  Psychiatric: Within normal limits  Endocrine: Within normal limits  Hematologic/Lymphatic: Within normal limits  Allergic/Immunologic: Within normal limits    [x] All other systems negative    OBJECTIVE:    Daily Height in cm: 162.56 (28 Aug 2022 15:12)      PHYSICAL EXAM:  ICU Vital Signs Last 24 Hrs  T(C): 36.9 (28 Aug 2022 15:12), Max: 37.2 (27 Aug 2022 19:45)  T(F): 98.5 (28 Aug 2022 15:12), Max: 98.9 (27 Aug 2022 19:45)  HR: 103 (28 Aug 2022 15:34) (88 - 111)  BP: 124/64 (28 Aug 2022 15:12) (102/70 - 127/96)  BP(mean): --  ABP: --  ABP(mean): --  RR: 20 (28 Aug 2022 15:12) (18 - 27)  SpO2: 98% (28 Aug 2022 15:34) (91% on room air -> 95% after 5 deep inspirations)    General: Awake and alert. Cooperative. No distress Appears stated age. Bedbound  HEENT: Atraumatic. Normocephalic. Anicteric. Normal oral mucosa. PERRL. EOMI. Mallampati class IV airway.  Neck: Supple. Trachea midline. Thyroid without enlargement/tenderness/nodules. No carotid bruit. No JVD. Short and wide  Cardiovascular: Regular rate and rhythm. S1 S2 normal. III/VI systolic murmur  Respiratory: Respirations unlabored. Left basilar rales. No wheeze. Kyphosis. Poor chest wall excursion  Abdomen: Soft. Non-tender. Non-distended. No organomegaly. No masses. Normal bowel sounds. Obese. PEG.  Extremities: Warm to touch. No clubbing or cyanosis. No pedal edema. Large legs. Left leg contracted under the right leg  Pulses: 2+ peripheral pulses all extremities.	  Skin: Normal skin color. No rashes or lesions. No ecchymoses. No cyanosis. Warm to touch.  Lymph Nodes: Cervical, supraclavicular and axillary nodes normal  Neurological: Left lower extremity plegia with contraction. Left upper extremity is quite weak. A and O x 3  Psychiatry: Appropriate mood and affect.    LABS:                          9.6    13.93 )-----------( 296      ( 28 Aug 2022 05:23 )             30.6     CBC    WBC  13.93 <==, 14.46 <==    Hemoglobin  9.6 <<==, 10.1 <<==    Hematocrit  30.6 <==, 31.8 <==    Platelets  296 <==, 298 <==      132<L>  |  94<L>  |  44<H>  ----------------------------<  141<H>    08-28  4.9   |  22  |  0.72    LYTES    sodium  132 <==, 130 <==, 132 <==, 131 <==    potassium   4.9 <==, 5.7 <==, 5.3 <==, 5.8 <==    chloride  94 <==, 93 <==, 94 <==, 94 <==    carbon dioxide  22 <==, 20 <==, 21 <==, 22 <==    =============================================================================================  RENAL FUNCTION:    Creatinine:   0.72  <<==, 0.69  <<==, 0.78  <<==, 0.69  <<==    BUN:   44 <==, 44 <==, 47 <==, 44 <==    ============================================================================================    calcium   9.7 <==, 9.8 <==, 9.9 <==, 9.8 <==    phos   4.7 <==    mag   2.3 <==    ============================================================================================  LFTs    AST:   37 <== , 29 <==     ALT:  16  <== , 16  <==     AP:  137  <=, 137  <=    Bili:  0.2  <=, 0.2  <=    Blood Gas Profile - Venous (22 @ 22:32)   pH, Venous: 7.36   pCO2, Venous: 47 mmHg   pO2, Venous: 48 mmHg   HCO3, Venous: 27 mmol/L   Base Excess, Venous: 0.6 mmol/L   Oxygen Saturation, Venous: 79.0 %   Total CO2, Venous: 28 mmol/L     Venous Blood Gas:   @ 20:50  7.31/48/57/24/86.9  VBG Lactate: 2.5    Serum Pro-Brain Natriuretic Peptide: 155 pg/mL ( @ 21:06)    CARDIAC MARKERS ( 27 Aug 2022 21:06 )  CPK x     /CKMB x     /CKMB Units x        troponin 21 ng/L        MICROBIOLOGY:     Respiratory Viral Panel with COVID-19 by RYAN (22 @ 21:05)   Rapid RVP Result: [facility]   SARS-CoV-2: [facility]  This Respiratory Panel uses polymerase chain reaction (PCR) to detect for   adenovirus; coronavirus (HKU1, NL63, 229E, OC43); human metapneumovirus   (hMPV); human enterovirus/rhinovirus (Entero/RV); influenza A; influenza   A/H1; influenza A/H3; influenza A/H1-2009; influenza B; parainfluenza   viruses 1, 2, 3, 4; respiratory syncytial virus; Mycoplasma pneumoniae;   Chlamydophila pneumoniae; and SARS-CoV-2.     Urinalysis Basic - ( 27 Aug 2022 22:42 )    Color: Light Orange / Appearance: Turbid / S.013 / pH: x  Gluc: x / Ketone: Negative  / Bili: Negative / Urobili: Negative   Blood: x / Protein: 30 mg/dL / Nitrite: Negative   Leuk Esterase: Large / RBC: 8 /hpf / WBC 1145 /HPF   Sq Epi: x / Non Sq Epi: 1 /hpf / Bacteria: Moderate    MRSA/MSSA PCR (22 @ 05:23)   MRSA PCR Result.: NotDete  Staph aureus PCR Result: NotDetec     RADIOLOGY:  [x ] Chest radiographs reviewed and interpreted by me    EXAM:  XR CHEST PORTABLE URGENT 1V                          PROCEDURE DATE:  2022      FINDINGS:    The heart size is not well evaluated in this projection.  Low lung volumes. Patchy left lower lung opacity is again seen, likely   atelectasis.  Possible small left pleural effusion. No pneumothorax.  No acute bony abnormalities. Partially visualized left humeral hardware.    IMPRESSION:    Patchy left lower lung opacity, likely atelectasis. Possible small left   pleural effusion.    MARKO GUAMAN DO; Resident Radiologist  This document has been electronically signed.  PRAVIN CASTILLO MD; Attending Radiologist  This document has been electronically signed. Aug 28 2022 10:09AM  ---------------------------------------------------------------------------------------------------------------

## 2022-08-28 NOTE — H&P ADULT - ASSESSMENT
Pt is an 85yo F w/ PMH of severe tracheobronchomalacia, HTN, HLD, CAD, CVA w/ residual L sided weakness, Intrinsic lung disease, Aortic stenosis, R breast CA s/p mastectomy, L lung collapse and MRSE p/w dyspnea from rehab likely i/s/o PNA vs atelectasis d/t mucus plugging all i/s/o tracheobronchomalacia.

## 2022-08-28 NOTE — H&P ADULT - HISTORY OF PRESENT ILLNESS
Pt is an 87yo F w/ PMH of severe tracheobronchomalacia, HTN, HLD, CAD, CVA w/ residual L sided weakness, Intrinsic lung disease, Aortic stenosis, R breast CA s/p mastectomy, L lung collapse and MRSE p/w dyspnea from rehab.     Unable to obtain further history from patient d/t dyspnea and use of CPAP. Remained of history provided by chart review and collateral from pt's son.    She has a previous hx of resp failure d/t mucus plugging w/ complete L lung collapse and severe tracheobronchomalacia requiring hospitalization at [facility] in March 2022. After discharge to rehab pt was maintained on BiPAP for about one month's time during the evening hours but was subsequently taken off. She was reportedly doing well until until started complaining of an inability to breath and having increased WOB prompting her ED visit.     In the ED she was noted to be tachycardic and tachypneic w/ labs significant for a leukocytosis and positive UA. CXR w/ LLL opacity vs atelectasis. She was administered Vanc, Zosyn, Tylenol and Duoneb treatments and cultures were sent to r/o infectious etiology.

## 2022-08-29 NOTE — DIETITIAN INITIAL EVALUATION ADULT - ENTERAL
Trial Jevity 1.5 starting at 10 ml/hr advancing by 10 ml q4 hours/as tolerated to goal rate 70 ml/hr x 18 hours (ordered for oral Synthroid). At goal rate, regimen provides: 1260 ml total volume, 1890 kcal, 80 g protein, 958 ml water (24 kcal/kg, 1 g/kg protein based on dosing weight). Meets >100% RDIs. Defer free water flushes to team. Continue to monitor electrolytes, RD remains available to adjust TF regimen as needed/able.

## 2022-08-29 NOTE — DIETITIAN INITIAL EVALUATION ADULT - PERTINENT LABORATORY DATA
08-28    132<L>  |  94<L>  |  44<H>  ----------------------------<  141<H>  4.9   |  22  |  0.72    Ca    9.7      28 Aug 2022 06:51  Phos  4.7     08-28  Mg     2.3     08-28    TPro  7.6  /  Alb  4.0  /  TBili  0.2  /  DBili  x   /  AST  37  /  ALT  16  /  AlkPhos  137<H>  08-28  A1C with Estimated Average Glucose Result: 6.3 % (12-21-21 @ 19:34)

## 2022-08-29 NOTE — SWALLOW BEDSIDE ASSESSMENT ADULT - SWALLOW EVAL: PATIENT/FAMILY GOALS STATEMENT
Pt wants to eat and drink; pt's son reports that the pt feels her QOL has been negatively impacted by NPO status, expresses interest in possibly pursuing pleasure feeds pending results of FEES

## 2022-08-29 NOTE — DIETITIAN INITIAL EVALUATION ADULT - ORAL INTAKE PTA/DIET HISTORY
Pt seen by AUDREY during previous admission (3/2022). At that time, ordered for bolus feeds of Glucerna 1.2, 250ml q6 hours (4 feeds daily). NKFA. Per transfer records, patient ordered for Vital 1.5 at 55 ml/hr until 1000 ml infused PTA (typically 4pm-12am), with LPS (liquid protein supplement) 2/day, Banatrol 3/day. This regimen provides: 1700 kcal, 97.5 g protein (21 kcal/kg, 1.2 g/kg protein based on dosing weight 80 kg).     Pt seen by RD during previous admission (3/2022). At that time, ordered for bolus feeds of Glucerna 1.2, 250ml q6 hours (4 feeds daily). NKFA.

## 2022-08-29 NOTE — PROGRESS NOTE ADULT - SUBJECTIVE AND OBJECTIVE BOX
CARDIOLOGY     PROGRESS  NOTE   ________________________________________________    CHIEF COMPLAINT:Patient is a 87y old  Female who presents with a chief complaint of SOB (29 Aug 2022 06:29)  no complain.  	  REVIEW OF SYSTEMS:  CONSTITUTIONAL: No fever, weight loss, or fatigue  EYES: No eye pain, visual disturbances, or discharge  ENT:  No difficulty hearing, tinnitus, vertigo; No sinus or throat pain  NECK: No pain or stiffness  RESPIRATORY: No cough, wheezing, chills or hemoptysis; No Shortness of Breath  CARDIOVASCULAR: No chest pain, palpitations, passing out, dizziness, or leg swelling  GASTROINTESTINAL: No abdominal or epigastric pain. No nausea, vomiting, or hematemesis; No diarrhea or constipation. No melena or hematochezia.  GENITOURINARY: No dysuria, frequency, hematuria, or incontinence  NEUROLOGICAL: No headaches, memory loss, loss of strength, numbness, or tremors  SKIN: No itching, burning, rashes, or lesions   LYMPH Nodes: No enlarged glands  ENDOCRINE: No heat or cold intolerance; No hair loss  MUSCULOSKELETAL: No joint pain or swelling; No muscle, back, or extremity pain  PSYCHIATRIC: No depression, anxiety, mood swings, or difficulty sleeping  HEME/LYMPH: No easy bruising, or bleeding gums  ALLERGY AND IMMUNOLOGIC: No hives or eczema	    [ ] All others negative	  [ ] Unable to obtain    PHYSICAL EXAM:  T(C): 36.7 (08-29-22 @ 06:00), Max: 37.1 (08-28-22 @ 19:10)  HR: 96 (08-29-22 @ 06:00) (89 - 103)  BP: 147/80 (08-29-22 @ 06:00) (102/70 - 147/80)  RR: 20 (08-29-22 @ 06:00) (18 - 20)  SpO2: 97% (08-29-22 @ 06:00) (97% - 100%)  Wt(kg): --  I&O's Summary    28 Aug 2022 07:01  -  29 Aug 2022 07:00  --------------------------------------------------------  IN: 100 mL / OUT: 850 mL / NET: -750 mL        Appearance: Normal	  HEENT:   Normal oral mucosa, PERRL, EOMI	  Lymphatic: No lymphadenopathy  Cardiovascular: Normal S1 S2, No JVD, + murmurs, No edema  Respiratory: rhonchi  Psychiatry: A & O x 3, Mood & affect appropriate  Gastrointestinal:  Soft, Non-tender, + BS	  Skin: No rashes, No ecchymoses, No cyanosis	  Neurologic: Non-focal  Extremities: Normal range of motion, No clubbing, cyanosis or edema  Vascular: Peripheral pulses palpable 2+ bilaterally    MEDICATIONS  (STANDING):  acetaminophen    Suspension .. 800 milliGRAM(s) Oral every 6 hours  albuterol/ipratropium for Nebulization 3 milliLiter(s) Nebulizer every 6 hours  allopurinol 100 milliGRAM(s) Oral daily  atorvastatin 20 milliGRAM(s) Oral at bedtime  cyanocobalamin 1000 MICROGram(s) Oral daily  dextrose 5% + lactated ringers. 1000 milliLiter(s) (80 mL/Hr) IV Continuous <Continuous>  diltiazem    Tablet 30 milliGRAM(s) Oral three times a day  enoxaparin Injectable 40 milliGRAM(s) SubCutaneous every 24 hours  escitalopram 10 milliGRAM(s) Oral daily  guaiFENesin Oral Liquid (Sugar-Free) 300 milliGRAM(s) Oral every 6 hours  levETIRAcetam  Solution 750 milliGRAM(s) Oral two times a day  levothyroxine 75 MICROGram(s) Oral daily  piperacillin/tazobactam IVPB.. 3.375 Gram(s) IV Intermittent every 8 hours  sodium chloride 3%  Inhalation 4 milliLiter(s) Inhalation every 12 hours      TELEMETRY: 	    ECG:  	  RADIOLOGY:  OTHER: 	  	  LABS:	 	    CARDIAC MARKERS:                                9.6    13.93 )-----------( 296      ( 28 Aug 2022 05:23 )             30.6     08-28    132<L>  |  94<L>  |  44<H>  ----------------------------<  141<H>  4.9   |  22  |  0.72    Ca    9.7      28 Aug 2022 06:51  Phos  4.7     08-28  Mg     2.3     08-28    TPro  7.6  /  Alb  4.0  /  TBili  0.2  /  DBili  x   /  AST  37  /  ALT  16  /  AlkPhos  137<H>  08-28    proBNP: Serum Pro-Brain Natriuretic Peptide: 155 pg/mL (08-27 @ 21:06)    Lipid Profile:   HgA1c:   TSH:         Assessment and plan  ---------------------------  Pt is an 87yo F w/ PMH of severe tracheobronchomalacia, HTN, HLD, CAD, CVA w/ residual L sided weakness, Intrinsic lung disease, Aortic stenosis, R breast CA s/p mastectomy, L lung collapse and MRSE p/w dyspnea from rehab.   Unable to obtain further history from patient d/t dyspnea and use of CPAP. Remained of history provided by chart review and collateral from pt's son.  She has a previous hx of resp failure d/t mucus plugging w/ complete L lung collapse and severe tracheobronchomalacia requiring hospitalization at [facility] in March 2022. After discharge to rehab pt was maintained on BiPAP for about one month's time during the evening hours but was subsequently taken off. She was reportedly doing well until until started complaining of an inability to breath and having increased WOB prompting her ED visit.   In the ED she was noted to be tachycardic and tachypneic w/ labs significant for a leukocytosis and positive UA. CXR w/ LLL opacity vs atelectasis. She was administered Vanc, Zosyn, Tylenol and Duoneb treatments and cultures were sent to r/o infectious etiology.  pt is well known to me with hx of htn, tracheomalacia with multiple admission with l lung collapse, htn, cad, chf diastolic dysfunction with perforated appendicitis.  sob ?to lung collapse increase o2 / neb  ct chest no contrast  continue po Cardizem  MODERATE AS, continue to follow, no need to repeat echo  pt with hx of ASHD/ MI, ?asa daily, pt with hx of GI bleed on the last admission  dvt prophylaxis  continue Bipap at night

## 2022-08-29 NOTE — PROGRESS NOTE ADULT - PROBLEM SELECTOR PLAN 2
Zosyn as above.     - f/u CT chest  - f/u blood, ur, and sputum cx CT (8/29) b/l atelectasis, multiple lytic bone lesions concerning for metastatic disease??  - f/u blood, ur, and sputum cx  - oncology cx in AM

## 2022-08-29 NOTE — SWALLOW BEDSIDE ASSESSMENT ADULT - COMMENTS
In the ED she was noted to be tachycardic and tachypneic w/ labs significant for a leukocytosis and positive UA. CXR w/ LLL opacity vs atelectasis. She was administered Vanc, Zosyn, Tylenol and Duoneb treatments and cultures were sent to r/o infectious etiology. Pt p/w dyspnea from rehab likely i/s/o PNA vs atelectasis d/t mucus plugging all i/s/o tracheobronchomalacia. ENT eval w/ laryngoscopy notable for vocal cords mobile and intact, no edema, upper airway patent. c/w CPAP for now to assist w/ airway patency and consider changing to nocturnal. Pulm consulted -> CXR reveals a poor inspiratory effort and subsegmental left lower lobe atelectasis - it is unlikely that she has pneumonia despite the mild leukocytosis. would repeat a speech and swallow evaluation - the patient's mental status is much improved from the time of prior testing and she has had speech therapy in rehab.     Per review of PACS, pt had MBS on 10/31/19. Per radiologist report: "There is evidence of penetration without aspiration or stasis with the tested consistencies." Unable to locate SLP report, unclear of diet recommendations at this time. Pt had MBS on 6/28/17 with recommendations for NPO, with non-oral nutrition/hydration/medications.

## 2022-08-29 NOTE — DIETITIAN INITIAL EVALUATION ADULT - OTHER INFO
Weight history per [facility] (in kg): 109.5 (12/23/21, 1/6/2022), 90.7 (1/20/22), 102.6 (3/21/22). Dosing weight this admission (80 kg) suggests ~22% weight loss since March (<1 year, clinically significant, ?accuracy of weights), ~12% weight loss since January. Will continue to monitor and trend weights as able. Weight history per [facility] (in kg): 109.5 (12/23/21, 1/6/2022), 90.7 (1/20/22), 102.6 (3/21/22). Dosing weight this admission (80 kg) suggests ~22% weight loss since March (<1 year, clinically significant, ?accuracy of weights), ~12% weight loss since January. Weight loss likely in setting of overweight/obese BMI now receiving adequate (not excessive) kcal and protein. Will continue to monitor and trend weights as able.

## 2022-08-29 NOTE — PROGRESS NOTE ADULT - SUBJECTIVE AND OBJECTIVE BOX
[facility]  REVIEW OF SYSTEMS:  GEN: no fever,    no chills  RESP: no SOB,   no cough  CVS: no chest pain,   no palpitations  GI: no abdominal pain,   no nausea,   no vomiting,   no constipation,   no diarrhea  : no dysuria,   no frequency  NEURO: no headache,   no dizziness  PSYCH: no depression,   not anxious  Derm : no rash    MEDICATIONS  (STANDING):  albuterol/ipratropium for Nebulization 3 milliLiter(s) Nebulizer every 6 hours  allopurinol 100 milliGRAM(s) Oral daily  atorvastatin 20 milliGRAM(s) Oral at bedtime  cyanocobalamin 1000 MICROGram(s) Oral daily  dextrose 5% + lactated ringers. 1000 milliLiter(s) (80 mL/Hr) IV Continuous <Continuous>  diltiazem    Tablet 30 milliGRAM(s) Oral three times a day  enoxaparin Injectable 40 milliGRAM(s) SubCutaneous every 24 hours  escitalopram 10 milliGRAM(s) Oral daily  guaiFENesin Oral Liquid (Sugar-Free) 300 milliGRAM(s) Oral every 6 hours  levETIRAcetam  Solution 750 milliGRAM(s) Oral two times a day  levothyroxine 75 MICROGram(s) Oral daily  piperacillin/tazobactam IVPB.. 3.375 Gram(s) IV Intermittent every 8 hours  sodium chloride 3%  Inhalation 4 milliLiter(s) Inhalation every 12 hours    MEDICATIONS  (PRN):  acetaminophen    Suspension .. 650 milliGRAM(s) Oral every 6 hours PRN Temp greater or equal to 38C (100.4F), Mild Pain (1 - 3)  traMADol 50 milliGRAM(s) Oral two times a day PRN Severe Pain (7 - 10)      Vital Signs Last 24 Hrs  T(C): 37.1 (28 Aug 2022 19:10), Max: 37.1 (28 Aug 2022 19:10)  T(F): 98.7 (28 Aug 2022 19:10), Max: 98.7 (28 Aug 2022 19:10)  HR: 89 (28 Aug 2022 19:10) (89 - 111)  BP: 116/70 (28 Aug 2022 19:10) (102/70 - 126/109)  BP(mean): --  RR: 20 (28 Aug 2022 19:10) (18 - 20)  SpO2: 100% (28 Aug 2022 19:10) (98% - 100%)    Parameters below as of 28 Aug 2022 19:10  Patient On (Oxygen Delivery Method): nasal cannula  O2 Flow (L/min): 3    CAPILLARY BLOOD GLUCOSE        I&O's Summary    28 Aug 2022 07:01  -  29 Aug 2022 05:57  --------------------------------------------------------  IN: 100 mL / OUT: 550 mL / NET: -450 mL        PHYSICAL EXAM:  HEAD:  Atraumatic, Normocephalic  NECK: Supple, No   JVD  CHEST/LUNG:   no     rales,     no,    rhonchi  HEART: Regular rate and rhythm;         murmur  ABDOMEN: Soft, Nontender, ;   EXTREMITIES:    no    edema  NEUROLOGY:  alert    LABS:                        9.6    13.93 )-----------( 296      ( 28 Aug 2022 05:23 )             30.6         132<L>  |  94<L>  |  44<H>  ----------------------------<  141<H>  4.9   |  22  |  0.72    Ca    9.7      28 Aug 2022 06:51  Phos  4.7       Mg     2.3         TPro  7.6  /  Alb  4.0  /  TBili  0.2  /  DBili  x   /  AST  37  /  ALT  16  /  AlkPhos  137<H>            Urinalysis Basic - ( 27 Aug 2022 22:42 )    Color: Light Orange / Appearance: Turbid / S.013 / pH: x  Gluc: x / Ketone: Negative  / Bili: Negative / Urobili: Negative   Blood: x / Protein: 30 mg/dL / Nitrite: Negative   Leuk Esterase: Large / RBC: 8 /hpf / WBC 1145 /HPF   Sq Epi: x / Non Sq Epi: 1 /hpf / Bacteria: Moderate           @ 22:32  0.6  48              Consultant(s) Notes Reviewed:      Care Discussed with Consultants/Other Providers:

## 2022-08-29 NOTE — DIETITIAN INITIAL EVALUATION ADULT - PERTINENT MEDS FT
MEDICATIONS  (STANDING):  acetaminophen    Suspension .. 800 milliGRAM(s) Oral every 6 hours  albuterol/ipratropium for Nebulization 3 milliLiter(s) Nebulizer every 6 hours  allopurinol 100 milliGRAM(s) Oral daily  atorvastatin 20 milliGRAM(s) Oral at bedtime  cyanocobalamin 1000 MICROGram(s) Oral daily  dextrose 5% + lactated ringers. 1000 milliLiter(s) (80 mL/Hr) IV Continuous <Continuous>  diltiazem    Tablet 30 milliGRAM(s) Oral three times a day  enoxaparin Injectable 40 milliGRAM(s) SubCutaneous every 24 hours  escitalopram 10 milliGRAM(s) Oral daily  guaiFENesin Oral Liquid (Sugar-Free) 300 milliGRAM(s) Oral every 6 hours  levETIRAcetam  Solution 750 milliGRAM(s) Oral two times a day  levothyroxine 75 MICROGram(s) Oral daily  piperacillin/tazobactam IVPB.. 3.375 Gram(s) IV Intermittent every 8 hours  sodium chloride 3%  Inhalation 4 milliLiter(s) Inhalation every 12 hours    MEDICATIONS  (PRN):  traMADol 50 milliGRAM(s) Oral two times a day PRN Severe Pain (7 - 10)

## 2022-08-29 NOTE — SWALLOW BEDSIDE ASSESSMENT ADULT - SLP PERTINENT HISTORY OF CURRENT PROBLEM
Pt is an 87yo F w/ PMH of severe tracheobronchomalacia, HTN, HLD, CAD, CVA w/ residual L sided weakness, Intrinsic lung disease, Aortic stenosis, R breast CA s/p mastectomy, L lung collapse and MRSE p/w dyspnea from rehab. Unable to obtain further history from patient d/t dyspnea and use of CPAP. Remained of history provided by chart review and collateral from pt's son. She has a previous hx of resp failure d/t mucus plugging w/ complete L lung collapse and severe tracheobronchomalacia requiring hospitalization at [facility] in March 2022. After discharge to rehab pt was maintained on BiPAP for about one month's time during the evening hours but was subsequently taken off. She was reportedly doing well until until started complaining of an inability to breath and having increased WOB prompting her ED visit.

## 2022-08-29 NOTE — SWALLOW BEDSIDE ASSESSMENT ADULT - SWALLOW EVAL: CURRENT DIET
Returned Sherley's call requesting CMN and clinic notes on the pt.  Sherley informed our office submitted paperwork stating pt has not been seen since Aug 2019 and has missed multiple appts.  Sherley stated the pt did not call them to order but [facility] always does future orders.  She stated she will call the patient.    ----- Message from Yolanda Parada RN sent at 4/27/2020 10:17 AM CDT -----  Contact: Sherley -[facility]   [phone] ext [phone]      ----- Message -----  From: Marly Sherwood  Sent: 4/27/2020  10:00 AM CDT  To: Nisha Abel Staff    Would like to receive medical advice.    Would they like a call back or a response via [facility]:  Call back    Additional information:  Calling to speak with the nurse regarding any update on pt chart notes and medical necessitys. Sherley states she faxed a request over three times and never received an answer. Sherley is requesting a call back regarding message.           NPO/PEG

## 2022-08-29 NOTE — DIETITIAN INITIAL EVALUATION ADULT - NS FNS DIET ORDER
Diet, NPO with Tube Feed:   Tube Feeding Modality: Gastrostomy  Vital 1.5 Naseem (VITAL1.5RTH)  Total Volume for 24 Hours (mL): 990  Continuous  Until Goal Tube Feed Rate (mL per Hour): 55  Tube Feed Duration (in Hours): 18  Tube Feed Start Time: 16:00  Tube Feed Stop Time: 10:00  Free Water Flush  Bolus   Total Volume per Flush (mL): 25   Frequency: Every 6 Hours  Liquid Protein Supplement     Qty per Day:  60mL (08-29-22 @ 08:12)

## 2022-08-29 NOTE — DIETITIAN INITIAL EVALUATION ADULT - REASON INDICATOR FOR ASSESSMENT
Consult for tube feeding recommendations  Source: EMR, medical team (patient confused, disoriented, unable to participate in RD interview at this time) Consult for tube feeding recommendations  Source: EMR, transfer records, medical team (patient confused, disoriented, unable to participate in RD interview at this time)

## 2022-08-29 NOTE — PROGRESS NOTE ADULT - ASSESSMENT
89    year old female    h/o hemorrhagic CVA, has  PEG,  HTN, MI,   HLD, gout, right ca  breast   right Ca breast. s/p mastectomy in 2019,  s/p  sentinel node  localization.  4/4,  were  negative  peg dislodged.  IR   replacements  on 1/3/22  s/p rrt  . in past,  for hypoxia. cxr with complete  collapsed  of  left lung, needing broch,   s/ p  bronchoscopy , pt  with  tracheomalacia  and  collapsed  airways,  makes  this a  difficult  situation, as there is no good rx for this     h/o bacteremia. on   pna, perforated  appendicitis,  ?  mets  to  acetabulum, was  seen by dr pacheco   surg/ IR  and  oncology eval dr pacheco   sa w pt.  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 this will not alter rx. pt  will need   extended  course  of ab   on iv  vanco and ertapenem.  till  2/9/.22  ID dr reagan, and per  surg  and  IR  eval,  no intervention   CT  1/20/22, no PE. collection in right side   s/p  rectal bleed.        *  admitted with sob    ENT eval w/ laryngoscopy notable for vocal cords mobile and intact, no edema, upper airway patent   Duonebs and hypertonic saline for airway clearance    Zosyn empiric for pna    *   s/p cva, has  PEG,  npo  ,  on  synthroid, Keppra  ,  *  HTN, on meds  per  card  *  h/o  ca breast. /, s/p  R  mastectomy  * obesity, bmi  was   41, now  is  30       c/c left  leg contracture ,  remains  bed  bound. functional  paraplegias  needs  assistance  for  all her  adl's   * pt  with  h/o  tracheomalacia  and  collapsed  airways,    given pt;s  mlple co morbidities,  pt is  at risk for  re admissions   on nocturnal  bipap  difficult  situation, a s there  is no  good  rx  for  pt's  recurrent lung collapse hypoxia    h/o  of  chronic    mild  tachycardia   re  admission  likely,, given her  airways, and  propensity  for  lung collapse   pt  is  oxygen dependent /  now  on N/ C  on iv  zosyn/  duration pe r pulm    per  son, pt is  full code     rad< from: CT Angio Chest PE Protocol w/ IV Cont (01.20.22 @ 10:35) >  IMPRESSION:  Limited exam for pulmonary embolism; no central pulmonary embolism with   nondiagnostic evaluation of lobar, segmental and subsegmental branches.   No imaging evidence of right heart strain.  Complete lingular and left lower lobe collapse withsuperimposed   bronchial impaction. Small left pleural effusion. Mild patchy right   apical groundglass may be related to pulmonary edema, infection or   inflammation.  Multilocular 4.0 x 2.2 cm right lower quadrant fluid collection in the   regionof previously perforated appendix, status post drain removal.   Periappendiceal fat stranding has largely resolved.  Left PICC crosses midline and terminates in the right innominate vein,   likely repositioned during the administration of contrast between    imaging and CTA acquisition.  Cholelithiasis and choledocholithiasis. No intrahepatic biliary ductal   dilatation.  --- End of Report ---  < end of copied text >

## 2022-08-29 NOTE — DIETITIAN INITIAL EVALUATION ADULT - REASON FOR ADMISSION
86yo female with PMH of severe tracheobronchomalacia, HTN, HLD, CAD, CVA with residual L-sided weakness, intrinsic lung disease, aortic stenosis, R breast ca s/p mastectomy, L lung collapse, and MRSE who presented with dyspnea from rehab. Admitted 8/28. Per team: "likely i/s/o PNA vs atelectasis d/t mucus plugging all i/s/o tracheobronchomalacia."

## 2022-08-29 NOTE — SWALLOW BEDSIDE ASSESSMENT ADULT - ADDITIONAL RECOMMENDATIONS
Maintain good oral care  Pt's son inquiring into Audiologist to assess pt's hearing. Primary team made aware.

## 2022-08-29 NOTE — PROGRESS NOTE ADULT - PROBLEM SELECTOR PLAN 1
Dyspnea and increased WOB c/f underlying pna vs atelectasis d/t being off positive pressure ventilation  ENT laryngoscopy (8/29) vocal cords mobile and intact, no edema, upper airway patent.  MRSA (8/28) negative. BCx (8/27) NGTD x2; UCx (8/27) pending; Sputum (8/27) pending collection    - c/w CPAP for now to assist w/ airway patency and consider changing to nocturnal  - Duonebs and hypertonic saline for airway clearance  - Zosyn empiric for PNA  - f/u blood and Ur cx and sputum cx, downtitrate abx based off sensitivities  - pulm following; recs appreciated Dyspnea and increased WOB c/f underlying pna vs atelectasis d/t being off positive pressure ventilation  ENT laryngoscopy (8/29) vocal cords mobile and intact, no edema, upper airway patent.  MRSA (8/28) negative. BCx (8/27) NGTD x2; UCx (8/27) Gram+ Sputum (8/27) pending collection    - d/c CPAP, continue only with 3L NC as needed  - Duonebs and hypertonic saline for airway clearance  - Zosyn empiric for PNA  - f/u UCx, downtitrate abx based off sensitivities  - pulm following; recs appreciated

## 2022-08-29 NOTE — SWALLOW BEDSIDE ASSESSMENT ADULT - SWALLOW EVAL: DIAGNOSIS
87yo F w/ PMH of severe tracheobronchomalacia, HTN, HLD, CAD, CVA w/ residual L sided weakness, Intrinsic lung disease, Aortic stenosis, R breast CA s/p mastectomy, L lung collapse and MRSE p/w dyspnea. Pt presents with known history of chronic oropharyngeal dysphagia, NPO with PEG for at least 3 years per pt's son, though has been reportedly receiving swallowing therapy at [facility]. Given history, subjective PO trials/ assessment is not clinically appropriate; instead, will proceed with instrumental assessment to determine whether patient can safely tolerate any PO.

## 2022-08-29 NOTE — DIETITIAN INITIAL EVALUATION ADULT - ADD RECOMMEND
1) Defer PO diet to team/SLP recommendations  - Nutrition care plan to remain consistent with GOC  2) Monitor enteral nutrition provision and tolerance, weight, labs, skin, GI status

## 2022-08-29 NOTE — PROGRESS NOTE ADULT - SUBJECTIVE AND OBJECTIVE BOX
***************************************************************  Jaylen Gastelum, PGY1  Internal Medicine   pager:  [facility]: [phone] [facility]: [phone]  ***************************************************************    FRANKI MEHTA  87y  MRN: [id number]    Patient is a 87y old  Female who presents with a chief complaint of SOB (29 Aug 2022 05:56)    Interval/Overnight Events: no events ON.     Subjective: Pt seen and examined at bedside. Denies fever, CP, SOB, abn pain, N/V, dysuria. Tolerating diet.      MEDICATIONS  (STANDING):  albuterol/ipratropium for Nebulization 3 milliLiter(s) Nebulizer every 6 hours  allopurinol 100 milliGRAM(s) Oral daily  atorvastatin 20 milliGRAM(s) Oral at bedtime  cyanocobalamin 1000 MICROGram(s) Oral daily  dextrose 5% + lactated ringers. 1000 milliLiter(s) (80 mL/Hr) IV Continuous <Continuous>  diltiazem    Tablet 30 milliGRAM(s) Oral three times a day  enoxaparin Injectable 40 milliGRAM(s) SubCutaneous every 24 hours  escitalopram 10 milliGRAM(s) Oral daily  guaiFENesin Oral Liquid (Sugar-Free) 300 milliGRAM(s) Oral every 6 hours  levETIRAcetam  Solution 750 milliGRAM(s) Oral two times a day  levothyroxine 75 MICROGram(s) Oral daily  piperacillin/tazobactam IVPB.. 3.375 Gram(s) IV Intermittent every 8 hours  sodium chloride 3%  Inhalation 4 milliLiter(s) Inhalation every 12 hours    MEDICATIONS  (PRN):  acetaminophen    Suspension .. 650 milliGRAM(s) Oral every 6 hours PRN Temp greater or equal to 38C (100.4F), Mild Pain (1 - 3)  traMADol 50 milliGRAM(s) Oral two times a day PRN Severe Pain (7 - 10)      Objective:    Vitals: Vital Signs Last 24 Hrs  T(C): 36.7 (22 @ 06:00), Max: 37.1 (22 @ 19:10)  T(F): 98.1 (22 @ 06:00), Max: 98.7 (22 @ 19:10)  HR: 96 (22 @ 06:00) (89 - 111)  BP: 147/80 (22 @ 06:00) (102/70 - 147/80)  BP(mean): --  RR: 20 (22 @ 06:00) (18 - 20)  SpO2: 97% (22 @ 06:00) (97% - 100%)                I&O's Summary    28 Aug 2022 07:01  -  29 Aug 2022 06:30  --------------------------------------------------------  IN: 100 mL / OUT: 850 mL / NET: -750 mL        PHYSICAL EXAM:  GENERAL: NAD, obese  HEAD:  Atraumatic, Normocephalic  EYES: EOMI, conjunctiva and sclera clear  CHEST/LUNG: breathing well with CPAP mask on; lung exam limited to anterior chest and by body habitus  HEART: Regular rate and rhythm; No murmurs, rubs, or gallops  ABDOMEN: Soft, Nondistended; +PEG tube, +tenderness to suprapubic palpation, decreased from yesterday  SKIN: No rashes or lesions  NERVOUS SYSTEM:  Alert & Oriented X3, no focal deficits    LABS:      132<L>  |  94<L>  |  44<H>  ----------------------------<  141<H>  4.9   |  22  |  0.72      130<L>  |  93<L>  |  44<H>  ----------------------------<  128<H>  5.7<H>   |  20<L>  |  0.69      132<L>  |  94<L>  |  47<H>  ----------------------------<  226<H>  5.3   |  21<L>  |  0.78    Ca    9.7      28 Aug 2022 06:51  Ca    9.8      28 Aug 2022 05:23  Ca    9.9      27 Aug 2022 23:35  Phos  4.7       Mg     2.3         TPro  7.6  /  Alb  4.0  /  TBili  0.2  /  DBili  x   /  AST  37  /  ALT  16  /  AlkPhos  137<H>    TPro  7.7  /  Alb  4.1  /  TBili  0.2  /  DBili  x   /  AST  29  /  ALT  16  /  AlkPhos  137<H>                      Urinalysis Basic - ( 27 Aug 2022 22:42 )    Color: Light Orange / Appearance: Turbid / S.013 / pH: x  Gluc: x / Ketone: Negative  / Bili: Negative / Urobili: Negative   Blood: x / Protein: 30 mg/dL / Nitrite: Negative   Leuk Esterase: Large / RBC: 8 /hpf / WBC 1145 /HPF   Sq Epi: x / Non Sq Epi: 1 /hpf / Bacteria: Moderate                          9.6    13.93 )-----------( 296      ( 28 Aug 2022 05:23 )             30.6                         10.1   14.46 )-----------( 298      ( 27 Aug 2022 21:06 )             31.8     CAPILLARY BLOOD GLUCOSE          RADIOLOGY & ADDITIONAL TESTS:    Imaging Personally Reviewed:  [x ] YES  [ ] NO    Consultants involved in case:   Consultant(s) Notes Reviewed:  [ x] YES  [ ] NO:   Care Discussed with Consultants/Other Providers [x ] YES  [ ] NO

## 2022-08-29 NOTE — DIETITIAN INITIAL EVALUATION ADULT - NSFNSNUTRCHEWSWALLOWFT_GEN_A_CORE
pending Speech & Swallow evaluation Speech Pathologist saw patient today for Swallow Bedside Assessment. Per SLP: "Given history, subjective PO trials/ assessment is not clinically appropriate; instead, will proceed with instrumental assessment to determine whether patient can safely tolerate any PO." Recommended to Continue NPO/PEG, FEES tentatively scheduled for tomorrow (8/30).

## 2022-08-29 NOTE — SWALLOW BEDSIDE ASSESSMENT ADULT - SLP GENERAL OBSERVATIONS
Pt encountered in bed, awake, on 2L NC. A&Ox2, to self and place. [place]. Hoarse/ strained vocal quality. Able to follow commands.

## 2022-08-30 NOTE — CONSULT NOTE ADULT - ASSESSMENT
87yo F w/ PMH of severe tracheobronchomalacia, HTN, HLD, CAD, CVA w/ residual L sided weakness, Intrinsic lung disease, Aortic stenosis, R breast CA s/p mastectomy, L lung collapse and MRSE p/w dyspnea from rehab with leukocytosis, positive urine cx    John Fragoso  Attending Physician   Division of Infectious Disease  Office #[phone]  Available on Microsoft Teams also  After 5pm/weekend or no response, call #[phone]

## 2022-08-30 NOTE — PROGRESS NOTE ADULT - PROBLEM SELECTOR PLAN 1
Dyspnea and increased WOB c/f underlying pna vs atelectasis d/t being off positive pressure ventilation  ENT laryngoscopy (8/29) vocal cords mobile and intact, no edema, upper airway patent.  MRSA (8/28) negative. BCx (8/27) NGTD x2; UCx (8/27) Gram+ Sputum (8/27) pending collection    - d/c CPAP, continue only with 3L NC as needed  - Duonebs and hypertonic saline for airway clearance  - consider d/c Zosyn empiric for PNA now that CT returned negative  - f/u UCx, downtitrate abx based off sensitivities  - pulm following; recs appreciated Dyspnea and increased WOB c/f underlying pna vs atelectasis d/t being off positive pressure ventilation  ENT laryngoscopy (8/29) vocal cords mobile and intact, no edema, upper airway patent.  MRSA (8/28) negative. BCx (8/27) NGTD x2; UCx (8/27) Gram+ Sputum (8/27) pending collection    - continue to wean O2, tolerating room air w/ SpO2>94%  - Duonebs and hypertonic saline for airway clearance  - consider d/c Zosyn empiric for PNA now that CT returned negative  - f/u UCx, downtitrate abx based off sensitivities  - pulm following; recs appreciated

## 2022-08-30 NOTE — CONSULT NOTE ADULT - ASSESSMENT
Pt is an 85yo F w/ PMH of severe tracheobronchomalacia, HTN, HLD, CAD, CVA w/ residual L sided weakness, Intrinsic lung disease, Aortic stenosis, R breast CA s/p mastectomy, L lung collapse and MRSE p/w dyspnea from rehab.   She has a previous hx of resp failure d/t mucus plugging w/ complete L lung collapse and severe tracheobronchomalacia requiring hospitalization at [facility] in March 2022. After discharge to rehab pt was maintained on BiPAP for about one month's time during the evening hours but was subsequently taken off. She was reportedly doing well until until started complaining of an inability to breath and having increased WOB prompting her ED visit 8/28/22.   In the ED she was noted to be tachycardic and tachypneic w/ labs significant for a leukocytosis and positive UA. CXR w/ LLL opacity vs atelectasis. She was administered Vanc, Zosyn, Tylenol and Duoneb treatments     She had right simple mastectomy in 4/2019 [id number] breast cancer, ER, [place] positive and Her-2 negative.   She would ideally have received adjuvant hormonal therapy.    Any Rx after that is unknown. CT (12.30.21 @ 14:12) >  BONES: Degenerative changes. T11 compression deformity, unchanged.   Redemonstration of an indeterminate lytic lesion in the right acetabulum.  Patient was in hospital at that time with perforated appendix with collection, managed conservatively.  Further evaluation was hampered by her comorbidities though thought was to get bone scan eventually as outpatient.  Now appears to have lytic lesions.  Will send paraproteinemia eval.  Dx can only be made with bone bx when stable and if desired  However MRI of spine when stable to be done for CT findings   Pt is an 87yo F w/ PMH of severe tracheobronchomalacia, HTN, HLD, CAD, CVA w/ residual L sided weakness, Intrinsic lung disease, Aortic stenosis, R breast CA s/p mastectomy, L lung collapse and MRSE p/w dyspnea from rehab.   She has a previous hx of resp failure d/t mucus plugging w/ complete L lung collapse and severe tracheobronchomalacia requiring hospitalization at [facility] in March 2022. After discharge to rehab pt was maintained on BiPAP for about one month's time during the evening hours but was subsequently taken off. She was reportedly doing well until until started complaining of an inability to breath and having increased WOB prompting her ED visit 8/28/22.   In the ED she was noted to be tachycardic and tachypneic w/ labs significant for a leukocytosis and positive UA. CXR w/ LLL opacity vs atelectasis. She was administered Vanc, Zosyn, Tylenol and Duoneb treatments     She had right simple mastectomy in 4/2019 [id number] breast cancer, ER, [place] positive and Her-2 negative.   She would ideally have received adjuvant hormonal therapy.    Any Rx after that is unknown. CT (12.30.21 @ 14:12) >  BONES: Degenerative changes. T11 compression deformity, unchanged.   Redemonstration of an indeterminate lytic lesion in the right acetabulum.  Patient was in hospital at that time with perforated appendix with collection, managed conservatively.  Further evaluation was hampered by her comorbidities though thought was to get bone scan eventually as outpatient.  Now appears to have lytic lesions in T spine  Will send paraproteinemia eval.  Dx can only be made with bone bx when stable and if desired  Ideally MRI of T spine is indicated.  I had a long discussion with patient's son Sy.  He was told about new findings on scars.  Ideally need of MRI T spine and bx from bone for dx.  He understood the limitations of doing these studies with his mother's respiratory status and co morbidities. He also understands the limitations of giving any treatment even if dx is made  However if possible at some time, he would like to know dx.    He finally said, he will talk to his brother.  For now concentrate on getting her respiratory status better.   If down the line if her condition improves in couple of months reconsider getting dx with biopsy as he would like to know that.  MRI spine if can be done for now

## 2022-08-30 NOTE — PROCEDURE NOTE - PROCEDURE FINDINGS AND DETAILS
16Fr G-tube reinsertion performed with fluoroscopy guidance. 7cc saline/contrast mix filled into balloon. Full report to follow.

## 2022-08-30 NOTE — PROGRESS NOTE ADULT - SUBJECTIVE AND OBJECTIVE BOX
CARDIOLOGY     PROGRESS  NOTE   ________________________________________________    CHIEF COMPLAINT:Patient is a 87y old  Female who presents with a chief complaint of SOB (30 Aug 2022 07:47)  doing well.  	  REVIEW OF SYSTEMS:  CONSTITUTIONAL: No fever, weight loss, or fatigue  EYES: No eye pain, visual disturbances, or discharge  ENT:  No difficulty hearing, tinnitus, vertigo; No sinus or throat pain  NECK: No pain or stiffness  RESPIRATORY: No cough, wheezing, chills or hemoptysis; No Shortness of Breath  CARDIOVASCULAR: No chest pain, palpitations, passing out, dizziness, or leg swelling  GASTROINTESTINAL: No abdominal or epigastric pain. No nausea, vomiting, or hematemesis; No diarrhea or constipation. No melena or hematochezia.  GENITOURINARY: No dysuria, frequency, hematuria, or incontinence  NEUROLOGICAL: No headaches, memory loss, loss of strength, numbness, or tremors  SKIN: No itching, burning, rashes, or lesions   LYMPH Nodes: No enlarged glands  ENDOCRINE: No heat or cold intolerance; No hair loss  MUSCULOSKELETAL: No joint pain or swelling; No muscle, back, or extremity pain  PSYCHIATRIC: No depression, anxiety, mood swings, or difficulty sleeping  HEME/LYMPH: No easy bruising, or bleeding gums  ALLERGY AND IMMUNOLOGIC: No hives or eczema	    [ ] All others negative	  [ ] Unable to obtain    PHYSICAL EXAM:  T(C): 36.3 (08-30-22 @ 05:26), Max: 36.6 (08-30-22 @ 01:46)  HR: 94 (08-30-22 @ 05:26) (81 - 97)  BP: 100/70 (08-30-22 @ 05:26) (100/70 - 130/54)  RR: 20 (08-30-22 @ 05:26) (20 - 20)  SpO2: 94% (08-30-22 @ 05:26) (94% - 99%)  Wt(kg): --  I&O's Summary    29 Aug 2022 07:01  -  30 Aug 2022 07:00  --------------------------------------------------------  IN: 1070 mL / OUT: 800 mL / NET: 270 mL        Appearance: Normal	  HEENT:   Normal oral mucosa, PERRL, EOMI	  Lymphatic: No lymphadenopathy  Cardiovascular: Normal S1 S2, No JVD, + murmurs, No edema  Respiratory: rhonchi  Psychiatry: A & O x 3, Mood & affect appropriate  Gastrointestinal:  Soft, Non-tender, + BS	  Skin: No rashes, No ecchymoses, No cyanosis	  Neurologic: Non-focal  Extremities: Normal range of motion, No clubbing, cyanosis or edema  Vascular: Peripheral pulses palpable 2+ bilaterally    MEDICATIONS  (STANDING):  acetaminophen   IVPB .. 1000 milliGRAM(s) IV Intermittent once  albuterol/ipratropium for Nebulization 3 milliLiter(s) Nebulizer every 6 hours  allopurinol 100 milliGRAM(s) Oral daily  atorvastatin 20 milliGRAM(s) Oral at bedtime  cyanocobalamin 1000 MICROGram(s) Oral daily  dextrose 5% + lactated ringers. 1000 milliLiter(s) (80 mL/Hr) IV Continuous <Continuous>  escitalopram 10 milliGRAM(s) Oral daily  guaiFENesin Oral Liquid (Sugar-Free) 300 milliGRAM(s) Oral every 6 hours  levETIRAcetam  IVPB 750 milliGRAM(s) IV Intermittent every 12 hours  levothyroxine Injectable 37.5 MICROGram(s) IV Push <User Schedule>  piperacillin/tazobactam IVPB.. 3.375 Gram(s) IV Intermittent every 8 hours  sodium chloride 3%  Inhalation 4 milliLiter(s) Inhalation every 12 hours      TELEMETRY: 	    ECG:  	  RADIOLOGY:  OTHER: 	  	  LABS:	 	    CARDIAC MARKERS:    proBNP: Serum Pro-Brain Natriuretic Peptide: 155 pg/mL (08-27 @ 21:06)    Lipid Profile:   HgA1c:   TSH:     < from: CT Chest No Cont (08.29.22 @ 09:06) >  Bilateral dependent atelectasis. Interval resolution of left lower lobe   complete collapse.    Multiple lytic bone lesions, predominantly in the thoracic vertebral   bodies which is concerning for metastatic disease. Unclear if there is   any spinal canal extension. Recommend MRI of the thoracic spine for   further evaluation.      Assessment and plan  ---------------------------  Pt is an 85yo F w/ PMH of severe tracheobronchomalacia, HTN, HLD, CAD, CVA w/ residual L sided weakness, Intrinsic lung disease, Aortic stenosis, R breast CA s/p mastectomy, L lung collapse and MRSE p/w dyspnea from rehab.   Unable to obtain further history from patient d/t dyspnea and use of CPAP. Remained of history provided by chart review and collateral from pt's son.  She has a previous hx of resp failure d/t mucus plugging w/ complete L lung collapse and severe tracheobronchomalacia requiring hospitalization at [facility] in March 2022. After discharge to rehab pt was maintained on BiPAP for about one month's time during the evening hours but was subsequently taken off. She was reportedly doing well until until started complaining of an inability to breath and having increased WOB prompting her ED visit.   In the ED she was noted to be tachycardic and tachypneic w/ labs significant for a leukocytosis and positive UA. CXR w/ LLL opacity vs atelectasis. She was administered Vanc, Zosyn, Tylenol and Duoneb treatments and cultures were sent to r/o infectious etiology.  pt is well known to me with hx of htn, tracheomalacia with multiple admission with l lung collapse, htn, cad, chf diastolic dysfunction with perforated appendicitis.  sob ?to lung collapse increase o2 / neb  ct chest no contrast  continue po Cardizem  MODERATE AS, continue to follow, no need to repeat echo  pt with hx of ASHD/ MI, ?asa daily, pt with hx of GI bleed on the last admission  dvt prophylaxis  continue Bipap at night  borderline low bp if symptomatic will consider adding midodrine

## 2022-08-30 NOTE — PROGRESS NOTE ADULT - PROBLEM SELECTOR PLAN 2
CT (8/29) b/l atelectasis, multiple lytic bone lesions concerning for metastatic disease??  - f/u blood, ur, and sputum cx  - oncology cx in AM Pt had R breast CA s/p mastectomy 4/2019, staged [id number] ER/NC+ and HER-2 negative, without adjuvant hormonal therapy. Prior CT (2021) showed possible lytic lesion, not worked up.  CT (8/29) b/l atelectasis, multiple lytic bone lesions concerning for metastatic disease.    - pending MRI  - GoC w/ pt and children

## 2022-08-30 NOTE — CONSULT NOTE ADULT - SUBJECTIVE AND OBJECTIVE BOX
Interventional Radiology    Evaluate for Procedure: G tube replacement     HPI: 87y Female with chronic indwelling 16FrG-tube last replaced on 1/2022 now displaced overnight. IR consulted for replacement. Per chart, a 15Fr flanagan was placed in the tract overnight.     Allergies: Toradol (Urticaria (Mild to Mod); Rash (Mild to Mod))    Medications (Abx/Cardiac/Anticoagulation/Blood Products)  diltiazem    Tablet: 30 milliGRAM(s) Oral (08-29 @ 21:50)  enoxaparin Injectable: 40 milliGRAM(s) SubCutaneous (08-29 @ 21:51)  piperacillin/tazobactam IVPB.: 200 mL/Hr IV Intermittent (08-28 @ 13:38)  piperacillin/tazobactam IVPB..: 25 mL/Hr IV Intermittent (08-30 @ 01:23)    Data:  T(C): 36.3  HR: 94  BP: 100/70  RR: 20  SpO2: 94%    -WBC 13.93 / HgB 9.6 / Hct 30.6 / Plt 296  -Na 132 / Cl 94 / BUN 44 / Glucose 141  -K 4.9 / CO2 22 / Cr 0.72  -ALT -- / Alk Phos -- / T.Bili --  -INR 1.00 / PTT 31.0    Radiology: reviewed    Assessment/Plan: 87y Female with chronic indwelling 16Fr G-tube last replaced on 1/2022 now displaced overnight. IR consulted for replacement. Per chart, a 15Fr flanagan was placed in the tract overnight.     -Will plan for replacement today 8/30  -Please place IR procedure order under DIXON Curiel  -STAT am labs  -Maintain COVID within 5 days  -Hold a/c x 24-48hrs  -Maintain active T&S  -Above d/w primary team  -Please contact IR at [phone] with any questions/ concerns regarding above.

## 2022-08-30 NOTE — PROGRESS NOTE ADULT - SUBJECTIVE AND OBJECTIVE BOX
[facility]    REVIEW OF SYSTEMS:  GEN: no fever,    no chills  RESP: no SOB,   no cough  CVS: no chest pain,   no palpitations  GI: no abdominal pain,   no nausea,   no vomiting,   no constipation,   no diarrhea  : no dysuria,   no frequency  NEURO: no headache,   no dizziness  PSYCH: no depression,   not anxious  Derm : no rash    MEDICATIONS  (STANDING):  acetaminophen   IVPB .. 1000 milliGRAM(s) IV Intermittent once  albuterol/ipratropium for Nebulization 3 milliLiter(s) Nebulizer every 6 hours  allopurinol 100 milliGRAM(s) Oral daily  atorvastatin 20 milliGRAM(s) Oral at bedtime  cyanocobalamin 1000 MICROGram(s) Oral daily  dextrose 5% + lactated ringers. 1000 milliLiter(s) (80 mL/Hr) IV Continuous <Continuous>  enoxaparin Injectable 40 milliGRAM(s) SubCutaneous every 24 hours  escitalopram 10 milliGRAM(s) Oral daily  guaiFENesin Oral Liquid (Sugar-Free) 300 milliGRAM(s) Oral every 6 hours  levETIRAcetam  IVPB 750 milliGRAM(s) IV Intermittent every 12 hours  levothyroxine Injectable 37.5 MICROGram(s) IV Push <User Schedule>  piperacillin/tazobactam IVPB.. 3.375 Gram(s) IV Intermittent every 8 hours  sodium chloride 3%  Inhalation 4 milliLiter(s) Inhalation every 12 hours    MEDICATIONS  (PRN):  HYDROmorphone  Injectable 0.25 milliGRAM(s) IV Push every 8 hours PRN Severe Pain (7 - 10)  traMADol 50 milliGRAM(s) Oral two times a day PRN Severe Pain (7 - 10)      Vital Signs Last 24 Hrs  T(C): 36.3 (30 Aug 2022 05:26), Max: 36.6 (30 Aug 2022 01:46)  T(F): 97.4 (30 Aug 2022 05:26), Max: 97.8 (30 Aug 2022 01:46)  HR: 94 (30 Aug 2022 05:26) (81 - 97)  BP: 100/70 (30 Aug 2022 05:26) (100/70 - 130/54)  BP(mean): --  RR: 20 (30 Aug 2022 05:26) (20 - 20)  SpO2: 94% (30 Aug 2022 05:26) (94% - 99%)    Parameters below as of 30 Aug 2022 05:26  Patient On (Oxygen Delivery Method): nasal cannula      CAPILLARY BLOOD GLUCOSE        I&O's Summary    29 Aug 2022 07:01  -  30 Aug 2022 07:00  --------------------------------------------------------  IN: 410 mL / OUT: 800 mL / NET: -390 mL        PHYSICAL EXAM:  HEAD:  Atraumatic, Normocephalic  NECK: Supple, No   JVD  CHEST/LUNG:   no     rales,     no,    rhonchi  HEART: Regular rate and rhythm;         murmur  ABDOMEN: Soft, Nontender, ;   EXTREMITIES:        edema  NEUROLOGY:  alert    LABS:                      08-28 @ 22:32  0.6  48              Consultant(s) Notes Reviewed:      Care Discussed with Consultants/Other Providers:

## 2022-08-30 NOTE — PROGRESS NOTE ADULT - PROBLEM SELECTOR PLAN 3
Mild hyponatremia to 132; SOsm and UOsm wnl.   - f/u Ur studies  - ctm CT (8/29) b/l atelectasis, multiple lytic bone lesions concerning for metastatic disease  - f/u blood, ur, and sputum cx  - oncology cx in AM

## 2022-08-30 NOTE — PROGRESS NOTE ADULT - PROBLEM SELECTOR PLAN 5
- c/w Diltiazem 30mg TID  - Artifact on ECG appears sinus, though read Afib RVR, will obtain repeat ECG  - if Afib, would be new as no prior documentation and would consider AC Pt has been NPO with PEG tube feeds for past year. SLP consulted for possible pleasure feeds in setting of poor prognosis    - PEG noted to be displaced? 8/29. Pending IR eval. Placed 1/22?  - All peg meds converted to IV for now  - discuss further w/ son (proxy?)  - pending FEES to eval for swallowing

## 2022-08-30 NOTE — PROGRESS NOTE ADULT - ASSESSMENT
ASSESSMENT:    Asked by the medical housestaff to evaluate the patient in pulmonary consultation. The patient is a 87 year old gentlewoman, lifelong non-smoker, well known to me without history of intrinsic lung disease. 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 She also has a history of HTN, HLD, CAD s/p MI with preserved LVEF and moderate aortic stenosis. The patient was admitted to [place] in December 2021 with fever and abdominal pain. She was found to have perforated appendicitis with abscess formation. She was treated with tube drainage and antibiotics for a polymicrobial infection. Hospital course was complicated by respiratory failure due to mucous plugging with complete collapse of the left lung. She underwent several bronchoscopies for pulmonary toilet and was found to have severe tracheobronchomalacia. She was treated with bronchodilators, mucolytic nebs, chest vest and nocturnal AVAPS with expansion of the left upper lobe and some reexpansion of the left lower lobe. She was admitted in March with hematochezia with a surprisingly stable respiratory status. The left lower lobe was well aerated on a CT scan of the abdomen and pelvis with only bibasilar subsegmental atelectasis - there was scattered sigmoid diverticulosis without evidence of diverticulitis - interval decrease in size of a fluid collection in the region of the previously perforated appendicitis measuring 2.8 x 1.5 cm previously measuring 4.0 x 2.2 cm - slight increase in mild adjacent inflammatory change. The GI bleed was treated conservatively.  She has been doing "well" at rehab although she remains bedbound and NPO. She is no longer wearing nocturnal NIV.  She returns with shortness of breath in the setting of back, neck and left lower extremity pain. ENT evaluation -> no upper airway pathology. The patient had been placed on BIPAP for mild acute hypercapnic respiratory failure that has resolved and to buttress open the airways. She is awake and alert. She currently has no shortness of breath or hypoxemia on room air. No cough, sputum production, hemoptysis, chest congestion or wheeze. No fevers, chills or sweats. No chest pain/pressure or palpitations.     the patient returns from [facility] rehab with dyspnea with mild hypercapnic respiratory failure in the setting of neck, back and left leg pain (chronically contracted) - the respiratory acidosis has resolved with NIV support and the patient is now without shortness of breath or hypoxemia on room air - the patient has severe tracheobronchomalacia that has resulted in mucous plugging with complete left lung collapse requiring several bronchoscopies for pulmonary toilet - the patient was felt not to be a candidate for surgery for the tracheobronchomalacia and stenting was felt to have more risk than benefit - she has not had significant mucous plugging for ~ 6 months - CXR reveals a poor inspiratory effort and subsegmental left lower lobe atelectasis - it is unlikely that she has pneumonia despite the mild leukocytosis    PLAN/RECOMMENDATIONS:    oxygen supplementation to keep saturation greater than 92% - she currently is stable on room air  no indication for NIV   CT scan -> bilateral dependent atelectasis - resolution of left lower lobe complete collapse - multiple lytic bone lesions predominantly in the thoracic vertebral bodies which is concerning for metastatic disease   no indication for bronchoscopy at this time  humeral radiograph -> redemonstrated focal lytic lesion in proximal medial humeral diaphysis with small area of permeative cortical destruction.  i am concerned that the patient has metastatic breast cancer  incentive spirometry  acapella device  on zosyn for a UTI (?) - the patient does not have pneumonia  albuterol/atrovent nebs q6h  hypertonic saline nebs q12h  guaifenesin 300mg 4 times daily via PEG  awaiting FEES - the patient's mental status is much improved from the time of prior swallowing testing and she has had speech therapy in rehab  treatment of hyponatremia per primary team  treatment of HTN, HLD, CAD, aortic stenosis per cardiology  DVT prophylaxis  analgesics    Thank you for the courtesy of this referral. Plan of care discussed with the patient at bedside and with the medical housestaff.    Kennedy Marcano MD, [facility]  [phone]  Pulmonary Medicine

## 2022-08-30 NOTE — CONSULT NOTE ADULT - PROBLEM SELECTOR RECOMMENDATION 9
No acute ENT intervention- consider pulmonology consult as pt has a history of mucus plugging throughout the bronchial tree requiring bronchoscopy and h/o tracheobronchomalacia.   Reconsult ENT PRN
-no fever  -reactive to lytic bone lesions possibly - per medicine  -bcx negative  -no obvious pna on CT  -monitor cbc  -favor dc abx and observe

## 2022-08-30 NOTE — PROGRESS NOTE ADULT - SUBJECTIVE AND OBJECTIVE BOX
[facility] - PROGRESS NOTE    CHIEF COMPLAINT: chronic hypoxic respiratory failure; acute hypercapnic respiratory failure; mucous plugging; atelectasis; weak cough; dysphagia; tracheobronchomalacia; h/o 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;     INTERVAL HISTORY: awaiting FEES; PEG tube has fallen out; pain is well controlled; no shortness of breath or hypoxemia on room air; no cough, sputum production, hemoptysis, chest congestion or wheeze; no fevers, chills or sweats; no chest pain/pressure or palpitations;    REVIEW OF SYSTEMS:  Constitutional: As per interval history  HEENT: Within normal limits  CV: As per interval history  Resp: As per interval history  GI: dysphagia  : Within normal limits  Musculoskeletal: Within normal limits  Skin: Within normal limits  Neurological: CVA - left sided plegia  Psychiatric: Within normal limits  Endocrine: Within normal limits  Hematologic/Lymphatic: Within normal limits  Allergic/Immunologic: Within normal limits    MEDICATIONS:     Pulmonary "  albuterol/ipratropium for Nebulization 3 milliLiter(s) Nebulizer every 6 hours  guaiFENesin Oral Liquid (Sugar-Free) 300 milliGRAM(s) Oral every 6 hours  sodium chloride 3%  Inhalation 4 milliLiter(s) Inhalation every 12 hours    Anti-microbials:  piperacillin/tazobactam IVPB.. 3.375 Gram(s) IV Intermittent every 8 hours    Cardiovascular:    Other:  acetaminophen   IVPB .. 1000 milliGRAM(s) IV Intermittent once  allopurinol 100 milliGRAM(s) Oral daily  atorvastatin 20 milliGRAM(s) Oral at bedtime  cyanocobalamin 1000 MICROGram(s) Oral daily  dextrose 5% + lactated ringers. 1000 milliLiter(s) IV Continuous <Continuous>  escitalopram 10 milliGRAM(s) Oral daily  levETIRAcetam  IVPB 750 milliGRAM(s) IV Intermittent every 12 hours  levothyroxine Injectable 37.5 MICROGram(s) IV Push <User Schedule>    MEDICATIONS  (PRN):  HYDROmorphone  Injectable 0.25 milliGRAM(s) IV Push every 8 hours PRN Severe Pain (7 - 10)  traMADol 50 milliGRAM(s) Oral two times a day PRN Severe Pain (7 - 10)    OBJECTIVE:    PHYSICAL EXAM:       ICU Vital Signs Last 24 Hrs  T(C): 36.3 (30 Aug 2022 05:26), Max: 36.6 (30 Aug 2022 01:46)  T(F): 97.4 (30 Aug 2022 05:26), Max: 97.8 (30 Aug 2022 01:46)  HR: 94 (30 Aug 2022 05:26) (81 - 97)  BP: 100/70 (30 Aug 2022 05:) (100/70 - 130/54)  BP(mean): --  ABP: --  ABP(mean): --  RR: 20 (30 Aug 2022 05:) (20 - 20)  SpO2: 94% (30 Aug 2022 05:) (94% - 99%) on room air    General: Awake and alert. Cooperative. No distress Appears stated age. Bedbound  HEENT: Atraumatic. Normocephalic. Anicteric. Normal oral mucosa. PERRL. EOMI. Mallampati class IV airway.  Neck: Supple. Trachea midline. Thyroid without enlargement/tenderness/nodules. No carotid bruit. No JVD. Short and wide  Cardiovascular: Regular rate and rhythm. S1 S2 normal. III/VI systolic murmur  Respiratory: Respirations unlabored. Left basilar rales. No wheeze. Kyphosis. Poor chest wall excursion  Abdomen: Soft. Non-tender. Non-distended. No organomegaly. No masses. Normal bowel sounds. Obese. Basurto cathter placed in PEG entrance site  Extremities: Warm to touch. No clubbing or cyanosis. No pedal edema. Large legs. Left leg contracted under the right leg  Pulses: 2+ peripheral pulses all extremities.	  Skin: Normal skin color. No rashes or lesions. No ecchymoses. No cyanosis. Warm to touch.  Lymph Nodes: Cervical, supraclavicular and axillary nodes normal  Neurological: Left lower extremity plegia with contraction. Left upper extremity is quite weak. A and O x 3  Psychiatry: Appropriate mood and affect.    LABS:      CBC    WBC  13.93 <==, 14.46 <==    Hemoglobin  9.6 <<==, 10.1 <<==    Hematocrit  30.6 <==, 31.8 <==    Platelets  296 <==, 298 <==      132<L>  |  94<L>  |  44<H>  ----------------------------<  141<H>    08-28  4.9   |  22  |  0.72      LYTES    sodium  132 <==, 130 <==, 132 <==, 131 <==    potassium   4.9 <==, 5.7 <==, 5.3 <==, 5.8 <==    chloride  94 <==, 93 <==, 94 <==, 94 <==    carbon dioxide  22 <==, 20 <==, 21 <==, 22 <==    =============================================================================================  RENAL FUNCTION:    Creatinine:   0.72  <<==, 0.69  <<==, 0.78  <<==, 0.69  <<==    BUN:   44 <==, 44 <==, 47 <==, 44 <==    ============================================================================================    calcium   9.7 <==, 9.8 <==, 9.9 <==, 9.8 <==    phos   4.7 <==    mag   2.3 <==    ============================================================================================  LFTs    AST:   37 <== , 29 <==     ALT:  16  <== , 16  <==     AP:  137  <=, 137  <=    Bili:  0.2  <=, 0.2  <=    Venous Blood Gas:   @ 22:32  7.36/47/48/27/79.0  VBG Lactate: --    Venous Blood Gas:   @ 20:50  7.31/48/57/24/86.9  VBG Lactate: 2.5    Serum Pro-Brain Natriuretic Peptide: 155 pg/mL ( @ 21:06)    CARDIAC MARKERS ( 27 Aug 2022 21:06 )  CPK x     /CKMB x     /CKMB Units x        troponin 21 ng/L    MICROBIOLOGY:     Respiratory Viral Panel with COVID-19 by RYAN (22 @ 21:05)   Rapid RVP Result: [facility]   SARS-CoV-2: [facility]  This Respiratory Panel uses polymerase chain reaction (PCR) to detect for   adenovirus; coronavirus (HKU1, NL63, 229E, OC43); human metapneumovirus   (hMPV); human enterovirus/rhinovirus (Entero/RV); influenza A; influenza   A/H1; influenza A/H3; influenza A/H1-2009; influenza B; parainfluenza   viruses 1, 2, 3, 4; respiratory syncytial virus; Mycoplasma pneumoniae;   Chlamydophila pneumoniae; and SARS-CoV-2.     Urinalysis Basic - ( 27 Aug 2022 22:42 )    Color: Light Orange / Appearance: Turbid / S.013 / pH: x  Gluc: x / Ketone: Negative  / Bili: Negative / Urobili: Negative   Blood: x / Protein: 30 mg/dL / Nitrite: Negative   Leuk Esterase: Large / RBC: 8 /hpf / WBC 1145 /HPF   Sq Epi: x / Non Sq Epi: 1 /hpf / Bacteria: Moderate    Culture - Urine (22 @ 22:42)   Specimen Source: Clean Catch Clean Catch (Midstream)   Culture Results:   >100,000 CFU/ml Gram positive organisms   <10,000 CFU/ml Normal Urogenital ck present     Culture - Blood (22 @ 20:30)   Specimen Source: .Blood Blood-Peripheral   Culture Results:   No growth to date.     Culture - Blood (22 @ 20:15)   Specimen Source: .Blood Blood-Peripheral   Culture Results:   No growth to date.     MRSA/MSSA PCR (22 @ 05:23)   MRSA PCR Result.: [place]  Staph aureus PCR Result: NotDetec     RADIOLOGY:  [x ] Chest radiographs reviewed and interpreted by me    EXAM:  XR CHEST PORTABLE URGENT 1V                          PROCEDURE DATE:  2022      FINDINGS:    The heart size is not well evaluated in this projection.  Low lung volumes. Patchy left lower lung opacity is again seen, likely   atelectasis.  Possible small left pleural effusion. No pneumothorax.  No acute bony abnormalities. Partially visualized left humeral hardware.    IMPRESSION:    Patchy left lower lung opacity, likely atelectasis. Possible small left   pleural effusion.    MARKO GUAMAN DO; Resident Radiologist  This document has been electronically signed.  PRAVIN CASTILLO MD; Attending Radiologist  This document has been electronically signed. Aug 28 2022 10:09AM  ---------------------------------------------------------------------------------------------------------------  EXAM:  CT CHEST                          PROCEDURE DATE:  2022        FINDINGS:    LYMPH NODES: No mediastinal lymphadenopathy.    HEART/VASCULATURE: The heart is normal in size. No pericardial effusion.   Aortic valve calcifications noted.    AIRWAYS/LUNGS/PLEURA: Patent central airways. Right lower lobe dependent   atelectasis appear similar to prior. Patchy groundglass opacities the   right apex are improved. Interval resolution of left lower lobe complete   collapse. There is mild dependent atelectasis of the left upper and lower   lobes.    UPPER ABDOMEN: Gallbladder stones.    BONES/SOFT TISSUES: Status post right mastectomy. Status post left   humerus ORIF. Marked kyphosis. Multiple lytic bone lesions predominantly   in the posterior and lateral thoracic vertebral bodies. It is unclear if   there is any spinal canal extension. Lytic lesions are alsoseen at the   right posterior seventh rib and right humeral head    OTHER: Right maxillary sinus is opacified.      IMPRESSION:  Bilateral dependent atelectasis. Interval resolution of left lower lobe   complete collapse.    Multiple lytic bone lesions, predominantly in the thoracic vertebral   bodies which is concerning for metastatic disease. Unclear if there is   any spinal canal extension. Recommend MRI of the thoracic spine for   further evaluation.    BAILEY STARR MD; Resident Radiologist  This document has been electronically signed.  HAY IRVIN MD; Attending Radiologist  This document has been electronically signed. Aug 29 2022 12:01PM  ---------------------------------------------------------------------------------------------------------------  EXAM:  XR HUMERUS MIN 2 VIEWS RT                          PROCEDURE DATE:  2022      EXAM:  Internal/external rotation AP right humerus from 2022 at 0927.   Compared to appearance on previous day chest CT.    IMPRESSION:  Generalized osteopenia. Redemonstrated focal lytic lesion in proximal   medial humeral diaphysis with small area of permeative cortical   destruction. No additional radiographically appreciated suspicious lytic   or blastic lesions in remaining imaged regions.    No current fractures or dislocations.    Preserved shoulder and elbow joint spaces.    IV cannula with attached tubing overlies upper arm.    MINE NICE MD; Attending Radiologist  This document has been electronically signed. Aug 30 2022 10:39AM  ---------------------------------------------------------------------------------------------------------------

## 2022-08-30 NOTE — PROGRESS NOTE ADULT - ASSESSMENT
89    year old female    h/o hemorrhagic CVA, has  PEG,  HTN, MI,   HLD, gout, right ca  breast   right Ca breast. s/p mastectomy in 2019,  s/p  sentinel node  localization.  4/4,  were  negative  peg dislodged.  IR   replacements  on 1/3/22  s/p rrt  . in past,  for hypoxia. cxr with complete  collapsed  of  left lung, needing broch,   s/ p  bronchoscopy , pt  with  tracheomalacia  and  collapsed  airways,  makes  this a  difficult  situation, as there is no good rx for this     h/o bacteremia. on   pna, perforated  appendicitis,  ?  mets  to  acetabulum, was  seen by dr pacheco   surg/ IR  and  oncology eval dr pacheco   sa w pt.  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 this will not alter rx. pt  will need   extended  course  of ab   on iv  vanco and ertapenem.  till  2/9/.22  ID dr reagan, and per  surg  and  IR  eval,  no intervention   CT  1/20/22, no PE. collection in right side   s/p  rectal bleed.        *  admitted with sob    ENT eval w/ laryngoscopy notable for vocal cords mobile and intact, no edema, upper airway patent   Duonebs and hypertonic saline for airway clearance    Zosyn empiric for pna    *   s/p cva, has  PEG,  npo  ,   on  synthroid, Keppra  ,  *  HTN, on meds  per  card  *  h/o  ca breast. /, s/p  R  mastectomy  *   obesity, bmi  was   41, now  is  30       c/c left  leg contracture ,  remains  bed  bound. functional  paraplegias  needs  assistance  for  all her  adl's   *  pt  with  h/o  tracheomalacia  and  collapsed  airways,      given pt;s  mlple co morbidities,  pt is  at risk for  re admissions     on nocturnal  bipap    difficult  situation, a s there  is no  good  rx  for  pt's  recurrent lung collapse hypoxia    h/o  of  chronic    mild  tachycardia   re  admission  likely,, given her  airways, and  propensity  for  lung collapse   pt  is  oxygen dependent /  now  on N/ C  on iv  zosyn/  duration per  pulm    events noted.  awaiting  G  tube  replacement   by IR/  awaiting   feest  by  swallow team    CT chest. lytic  lesions, T  spine/  ribs/ humerus/  oncoloigy  magnus pacheco.  suspect mets  from ?  ca breast    per  son, pt is  full code     rad< from: CT Angio Chest PE Protocol w/ IV Cont (01.20.22 @ 10:35) >  IMPRESSION:  Limited exam for pulmonary embolism; no central pulmonary embolism with   nondiagnostic evaluation of lobar, segmental and subsegmental branches.   No imaging evidence of right heart strain.  Complete lingular and left lower lobe collapse withsuperimposed   bronchial impaction. Small left pleural effusion. Mild patchy right   apical groundglass may be related to pulmonary edema, infection or   inflammation.  Multilocular 4.0 x 2.2 cm right lower quadrant fluid collection in the   regionof previously perforated appendix, status post drain removal.   Periappendiceal fat stranding has largely resolved.  Left PICC crosses midline and terminates in the right innominate vein,   likely repositioned during the administration of contrast between    imaging and CTA acquisition.  Cholelithiasis and choledocholithiasis. No intrahepatic biliary ductal   dilatation.  --- End of Report ---  < end of copied text >                    89    year old female    h/o hemorrhagic CVA, has  PEG,  HTN, MI,   HLD, gout, right ca  breast   right Ca breast. s/p mastectomy in 2019,  s/p  sentinel node  localization.  4/4,  were  negative  peg dislodged.  IR   replacements  on 1/3/22  s/p rrt  . in past,  for hypoxia. cxr with complete  collapsed  of  left lung, needing broch,   s/ p  bronchoscopy , pt  with  tracheomalacia  and  collapsed  airways,  makes  this a  difficult  situation, as there is no good rx for this     h/o bacteremia. on   pna, perforated  appendicitis,  ?  mets  to  acetabulum, was  seen by dr pacheco   surg/ IR  and  oncology eval dr pacheco   sa w pt.  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 this will not alter rx. pt  will need   extended  course  of ab   on iv  vanco and ertapenem.  till  2/9/.22  ID dr reagan, and per  surg  and  IR  eval,  no intervention   CT  1/20/22, no PE. collection in right side   s/p  rectal bleed.        *  admitted with sob    ENT eval w/ laryngoscopy notable for vocal cords mobile and intact, no edema, upper airway patent   Duonebs and hypertonic saline for airway clearance    Zosyn empiric for pna    *   s/p cva, has  PEG,  npo  ,   on  synthroid, Keppra  ,  *  HTN, on meds  per  card  *  h/o  ca breast. /, s/p  R  mastectomy  *   obesity, bmi  was   41, now  is  30       c/c left  leg contracture ,  remains  bed  bound. functional  paraplegias  needs  assistance  for  all her  adl's   *  pt  with  h/o  tracheomalacia  and  collapsed  airways,      given pt;s  mlple co morbidities,  pt is  at risk for  re admissions     on nocturnal  bipap    difficult  situation, a s there  is no  good  rx  for  pt's  recurrent lung collapse hypoxia    h/o  of  chronic    mild  tachycardia   re  admission  likely,, given her  airways, and  propensity  for  lung collapse   pt  is  oxygen dependent /  now  on N/ C  on iv  zosyn/  duration per  pulm    events noted.  awaiting  G  tube  replacement   by IR/  awaiting   Feest  by  swallow team    CT chest. lytic  lesions, T  spine/  ribs/ humerus/  oncoloigy  magnus pacheco.  suspect mets  from ?  ca breast   called son. Sy, updated/ oncologist will also  call him      per  son, pt is  full code     rad< from: CT Angio Chest PE Protocol w/ IV Cont (01.20.22 @ 10:35) >  IMPRESSION:  Limited exam for pulmonary embolism; no central pulmonary embolism with   nondiagnostic evaluation of lobar, segmental and subsegmental branches.   No imaging evidence of right heart strain.  Complete lingular and left lower lobe collapse withsuperimposed   bronchial impaction. Small left pleural effusion. Mild patchy right   apical groundglass may be related to pulmonary edema, infection or   inflammation.  Multilocular 4.0 x 2.2 cm right lower quadrant fluid collection in the   regionof previously perforated appendix, status post drain removal.   Periappendiceal fat stranding has largely resolved.  Left PICC crosses midline and terminates in the right innominate vein,   likely repositioned during the administration of contrast between    imaging and CTA acquisition.  Cholelithiasis and choledocholithiasis. No intrahepatic biliary ductal   dilatation.  --- End of Report ---  < end of copied text >

## 2022-08-30 NOTE — PROGRESS NOTE ADULT - SUBJECTIVE AND OBJECTIVE BOX
***************************************************************  Jaylen Gastelum, PGY1  Internal Medicine   pager:  ADILENE: [phone] [facility]: [phone]  ***************************************************************    FRANKI MEHTA  87y  MRN: [id number]    Patient is a 87y old  Female who presents with a chief complaint of 88yo female with PMH of severe tracheobronchomalacia, HTN, HLD, CAD, CVA with residual L-sided weakness, intrinsic lung disease, aortic stenosis, R breast ca s/p mastectomy, L lung collapse, and MRSE who presented with dyspnea from rehab. Admitted 8/28. Per team: "likely i/s/o PNA vs atelectasis d/t mucus plugging all i/s/o tracheobronchomalacia."     (29 Aug 2022 10:16)      Interval/Overnight Events: PEG reported to be displaced o/n, all meds converted to IV. CT unlikely to be PNA, shows b/l atelectasis, considering stopping zosyn. Pt satted well off NC, now back on 3L    Subjective: Pt seen and examined at bedside. Denies fever, CP, SOB, abn pain, N/V, dysuria. Tolerating diet.      MEDICATIONS  (STANDING):  acetaminophen   IVPB .. 1000 milliGRAM(s) IV Intermittent once  albuterol/ipratropium for Nebulization 3 milliLiter(s) Nebulizer every 6 hours  allopurinol 100 milliGRAM(s) Oral daily  atorvastatin 20 milliGRAM(s) Oral at bedtime  cyanocobalamin 1000 MICROGram(s) Oral daily  dextrose 5% + lactated ringers. 1000 milliLiter(s) (80 mL/Hr) IV Continuous <Continuous>  enoxaparin Injectable 40 milliGRAM(s) SubCutaneous every 24 hours  escitalopram 10 milliGRAM(s) Oral daily  guaiFENesin Oral Liquid (Sugar-Free) 300 milliGRAM(s) Oral every 6 hours  levETIRAcetam  IVPB 750 milliGRAM(s) IV Intermittent every 12 hours  levothyroxine Injectable 37.5 MICROGram(s) IV Push <User Schedule>  piperacillin/tazobactam IVPB.. 3.375 Gram(s) IV Intermittent every 8 hours  sodium chloride 3%  Inhalation 4 milliLiter(s) Inhalation every 12 hours    MEDICATIONS  (PRN):  HYDROmorphone  Injectable 0.25 milliGRAM(s) IV Push every 8 hours PRN Severe Pain (7 - 10)  traMADol 50 milliGRAM(s) Oral two times a day PRN Severe Pain (7 - 10)      Objective:    Vitals: Vital Signs Last 24 Hrs  T(C): 36.3 (08-30-22 @ 05:26), Max: 36.6 (08-30-22 @ 01:46)  T(F): 97.4 (08-30-22 @ 05:26), Max: 97.8 (08-30-22 @ 01:46)  HR: 94 (08-30-22 @ 05:26) (81 - 97)  BP: 100/70 (08-30-22 @ 05:26) (100/70 - 130/54)  BP(mean): --  RR: 20 (08-30-22 @ 05:26) (20 - 20)  SpO2: 94% (08-30-22 @ 05:26) (94% - 99%)                I&O's Summary    28 Aug 2022 07:01  -  29 Aug 2022 07:00  --------------------------------------------------------  IN: 100 mL / OUT: 850 mL / NET: -750 mL    29 Aug 2022 07:01  -  30 Aug 2022 06:34  --------------------------------------------------------  IN: 410 mL / OUT: 800 mL / NET: -390 mL        PHYSICAL EXAM:  GENERAL: NAD  HEAD:  Atraumatic, Normocephalic  EYES: EOMI, conjunctiva and sclera clear  CHEST/LUNG: Clear to auscultation bilaterally; No rales, rhonchi, wheezing, or rubs  HEART: Regular rate and rhythm; No murmurs, rubs, or gallops  ABDOMEN: Soft, Nontender, Nondistended;   SKIN: No rashes or lesions  NERVOUS SYSTEM:  Alert & Oriented X3, no focal deficits    LABS:  08-28    132<L>  |  94<L>  |  44<H>  ----------------------------<  141<H>  4.9   |  22  |  0.72  08-28    130<L>  |  93<L>  |  44<H>  ----------------------------<  128<H>  5.7<H>   |  20<L>  |  0.69  08-27    132<L>  |  94<L>  |  47<H>  ----------------------------<  226<H>  5.3   |  21<L>  |  0.78    Ca    9.7      28 Aug 2022 06:51  Ca    9.8      28 Aug 2022 05:23  Ca    9.9      27 Aug 2022 23:35    TPro  7.6  /  Alb  4.0  /  TBili  0.2  /  DBili  x   /  AST  37  /  ALT  16  /  AlkPhos  137<H>  08-28  TPro  7.7  /  Alb  4.1  /  TBili  0.2  /  DBili  x   /  AST  29  /  ALT  16  /  AlkPhos  137<H>  08-27                                              9.6    13.93 )-----------( 296      ( 28 Aug 2022 05:23 )             30.6                         10.1   14.46 )-----------( 298      ( 27 Aug 2022 21:06 )             31.8     CAPILLARY BLOOD GLUCOSE          RADIOLOGY & ADDITIONAL TESTS:    Imaging Personally Reviewed:  [x ] YES  [ ] NO    Consultants involved in case:   Consultant(s) Notes Reviewed:  [ x] YES  [ ] NO:   Care Discussed with Consultants/Other Providers [x ] YES  [ ] NO         ***************************************************************  Jaylen Gastelum, PGY1  Internal Medicine   pager:  ADILENE: [phone] [facility]: [phone]  ***************************************************************    FRANKI MEHTA  87y  MRN: [id number]    Patient is a 87y old  Female who presents with a chief complaint of 86yo female with PMH of severe tracheobronchomalacia, HTN, HLD, CAD, CVA with residual L-sided weakness, intrinsic lung disease, aortic stenosis, R breast ca s/p mastectomy, L lung collapse, and MRSE who presented with dyspnea from rehab. Admitted 8/28. Per team: "likely i/s/o PNA vs atelectasis d/t mucus plugging all i/s/o tracheobronchomalacia."     (29 Aug 2022 10:16)      Interval/Overnight Events: PEG reported to be displaced o/n, all meds converted to IV. CT unlikely to be PNA, shows b/l atelectasis, considering stopping zosyn. Pt satted well off NC, now back on 3L    Subjective: Pt seen and examined at bedside. Denies fever, CP, SOB, abn pain, N/V, dysuria. Tolerating diet.      MEDICATIONS  (STANDING):  acetaminophen   IVPB .. 1000 milliGRAM(s) IV Intermittent once  albuterol/ipratropium for Nebulization 3 milliLiter(s) Nebulizer every 6 hours  allopurinol 100 milliGRAM(s) Oral daily  atorvastatin 20 milliGRAM(s) Oral at bedtime  cyanocobalamin 1000 MICROGram(s) Oral daily  dextrose 5% + lactated ringers. 1000 milliLiter(s) (80 mL/Hr) IV Continuous <Continuous>  enoxaparin Injectable 40 milliGRAM(s) SubCutaneous every 24 hours  escitalopram 10 milliGRAM(s) Oral daily  guaiFENesin Oral Liquid (Sugar-Free) 300 milliGRAM(s) Oral every 6 hours  levETIRAcetam  IVPB 750 milliGRAM(s) IV Intermittent every 12 hours  levothyroxine Injectable 37.5 MICROGram(s) IV Push <User Schedule>  piperacillin/tazobactam IVPB.. 3.375 Gram(s) IV Intermittent every 8 hours  sodium chloride 3%  Inhalation 4 milliLiter(s) Inhalation every 12 hours    MEDICATIONS  (PRN):  HYDROmorphone  Injectable 0.25 milliGRAM(s) IV Push every 8 hours PRN Severe Pain (7 - 10)  traMADol 50 milliGRAM(s) Oral two times a day PRN Severe Pain (7 - 10)      Objective:    Vitals: Vital Signs Last 24 Hrs  T(C): 36.3 (08-30-22 @ 05:26), Max: 36.6 (08-30-22 @ 01:46)  T(F): 97.4 (08-30-22 @ 05:26), Max: 97.8 (08-30-22 @ 01:46)  HR: 94 (08-30-22 @ 05:26) (81 - 97)  BP: 100/70 (08-30-22 @ 05:26) (100/70 - 130/54)  BP(mean): --  RR: 20 (08-30-22 @ 05:26) (20 - 20)  SpO2: 94% (08-30-22 @ 05:26) (94% - 99%)                I&O's Summary    28 Aug 2022 07:01  -  29 Aug 2022 07:00  --------------------------------------------------------  IN: 100 mL / OUT: 850 mL / NET: -750 mL    29 Aug 2022 07:01  -  30 Aug 2022 06:34  --------------------------------------------------------  IN: 410 mL / OUT: 800 mL / NET: -390 mL        PHYSICAL EXAM:  GENERAL: NAD, obese  HEAD:  Atraumatic, Normocephalic  EYES: EOMI, conjunctiva and sclera clear  CHEST/LUNG: +wheezes b/l. limited anterior exam due to body habitus  HEART: S1 S2 RRR. Limited exam due to body habitus  ABDOMEN: Soft, Nontender, Nondistended;   SKIN: No rashes or lesions  NERVOUS SYSTEM:  Alert & Oriented X3, no focal deficits. Easily distractible.    LABS:  08-28    132<L>  |  94<L>  |  44<H>  ----------------------------<  141<H>  4.9   |  22  |  0.72  08-28    130<L>  |  93<L>  |  44<H>  ----------------------------<  128<H>  5.7<H>   |  20<L>  |  0.69  08-27    132<L>  |  94<L>  |  47<H>  ----------------------------<  226<H>  5.3   |  21<L>  |  0.78    Ca    9.7      28 Aug 2022 06:51  Ca    9.8      28 Aug 2022 05:23  Ca    9.9      27 Aug 2022 23:35    TPro  7.6  /  Alb  4.0  /  TBili  0.2  /  DBili  x   /  AST  37  /  ALT  16  /  AlkPhos  137<H>  08-28  TPro  7.7  /  Alb  4.1  /  TBili  0.2  /  DBili  x   /  AST  29  /  ALT  16  /  AlkPhos  137<H>  08-27                                              9.6    13.93 )-----------( 296      ( 28 Aug 2022 05:23 )             30.6                         10.1   14.46 )-----------( 298      ( 27 Aug 2022 21:06 )             31.8     CAPILLARY BLOOD GLUCOSE          RADIOLOGY & ADDITIONAL TESTS:    Imaging Personally Reviewed:  [x ] YES  [ ] NO    Consultants involved in case:   Consultant(s) Notes Reviewed:  [ x] YES  [ ] NO:   Care Discussed with Consultants/Other Providers [x ] YES  [ ] NO

## 2022-08-30 NOTE — PROGRESS NOTE ADULT - PROBLEM SELECTOR PLAN 4
Pt has been NPO with PEG tube feeds for past year. SLP consulted for possible pleasure feeds in setting of poor prognosis    - PEG noted to be displaced? 8/29. Pending IR eval. Placed 1/22?  - All peg meds converted to IV for now  - discuss further w/ son (proxy?)  - pending FEES to eval for swallowing Mild hyponatremia to 132; SOsm and UOsm wnl.   - f/u Ur studies  - ctm

## 2022-08-30 NOTE — CONSULT NOTE ADULT - CONSULT REQUESTED DATE/TIME
30-Aug-2022 06:56
28-Aug-2022 19:05
27-Aug-2022 23:09
28-Aug-2022
30-Aug-2022 11:21
27-Aug-2022 22:50
30-Aug-2022 10:44

## 2022-08-30 NOTE — CONSULT NOTE ADULT - CONSULT REASON
stridor/wheezing
chronic hypoxic respiratory failure; acute hypercapnic respiratory failure; mucous plugging; atelectasis; weak cough; dysphagia; tracheobronchomalacia; h/o 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; PEG in place
SOB
Dyspnea
G-tube replacement
Lytic lesions spine, h/o breast cancer
leukocytosis

## 2022-08-30 NOTE — CHART NOTE - NSCHARTNOTEFT_GEN_A_CORE
NF contacted for pt PEG tube being removed. Per chart review, possibly placed in 01/2022? Placed straight catheter 15Fr into opening to maintain patency. Will sign out to day team for f/u on tube replacement. NF contacted for pt PEG tube being removed. Per chart review, possibly placed in 01/2022? Placed straight catheter 15Fr into opening to maintain patency. Converted oral meds given via PEG to IV for meanwhile. Will sign out to day team for f/u on tube replacement. NF contacted for pt PEG tube being removed/displaced. Per chart review, possibly placed in 01/2022? Placed straight catheter 15Fr into opening to maintain patency, tegaderm in place to prevent displacement. Converted oral meds given via PEG to IV for meanwhile. Will sign out to day team for f/u on tube replacement.

## 2022-08-30 NOTE — CONSULT NOTE ADULT - SUBJECTIVE AND OBJECTIVE BOX
FRANKI MEHTA 87y Female  MRN-[id number]    Patient is a 87y old  Female who presents with a chief complaint of SOB (30 Aug 2022 08:32)      HPI:  Pt is an 85yo F w/ PMH of severe tracheobronchomalacia, HTN, HLD, CAD, CVA w/ residual L sided weakness, Intrinsic lung disease, Aortic stenosis, R breast CA s/p mastectomy, L lung collapse and MRSE p/w dyspnea from rehab.     Unable to obtain further history from patient d/t dyspnea and use of CPAP. Remained of history provided by chart review and collateral from pt's son.    She has a previous hx of resp failure d/t mucus plugging w/ complete L lung collapse and severe tracheobronchomalacia requiring hospitalization at [facility] in March 2022. After discharge to rehab pt was maintained on BiPAP for about one month's time during the evening hours but was subsequently taken off. She was reportedly doing well until until started complaining of an inability to breath and having increased WOB prompting her ED visit.     In the ED she was noted to be tachycardic and tachypneic w/ labs significant for a leukocytosis and positive UA. CXR w/ LLL opacity vs atelectasis. She was administered Vanc, Zosyn, Tylenol and Duoneb treatments and cultures were sent to r/o infectious etiology.   (28 Aug 2022 04:43)    No fever.     PAST MEDICAL & SURGICAL HISTORY:  MI (myocardial infarction)      Personal history of asbestosis      Gout      UTI (lower urinary tract infection)      ETOH abuse      CVA (cerebral vascular accident)      Tracheobronchomalacia      HTN (hypertension)      HLD (hyperlipidemia)      History of left shoulder fracture      History of benign breast tumor          Allergies    Toradol (Urticaria (Mild to Mod); Rash (Mild to Mod))    Intolerances        ANTIMICROBIALS:  piperacillin/tazobactam IVPB.. 3.375 every 8 hours      MEDICATIONS  (STANDING):  acetaminophen   IVPB .. 1000 milliGRAM(s) IV Intermittent once  albuterol/ipratropium for Nebulization 3 milliLiter(s) Nebulizer every 6 hours  allopurinol 100 milliGRAM(s) Oral daily  atorvastatin 20 milliGRAM(s) Oral at bedtime  cyanocobalamin 1000 MICROGram(s) Oral daily  dextrose 5% + lactated ringers. 1000 milliLiter(s) (80 mL/Hr) IV Continuous <Continuous>  escitalopram 10 milliGRAM(s) Oral daily  guaiFENesin Oral Liquid (Sugar-Free) 300 milliGRAM(s) Oral every 6 hours  levETIRAcetam  IVPB 750 milliGRAM(s) IV Intermittent every 12 hours  levothyroxine Injectable 37.5 MICROGram(s) IV Push <User Schedule>  piperacillin/tazobactam IVPB.. 3.375 Gram(s) IV Intermittent every 8 hours  sodium chloride 3%  Inhalation 4 milliLiter(s) Inhalation every 12 hours      Social History  Smoking: no  Etoh: yes  Drug use: no      FAMILY HISTORY:  No pertinent family history in first degree relatives        Vital Signs Last 24 Hrs  T(C): 36.3 (30 Aug 2022 05:26), Max: 36.6 (30 Aug 2022 01:46)  T(F): 97.4 (30 Aug 2022 05:26), Max: 97.8 (30 Aug 2022 01:46)  HR: 94 (30 Aug 2022 05:26) (81 - 97)  BP: 100/70 (30 Aug 2022 05:26) (100/70 - 130/54)  BP(mean): --  RR: 20 (30 Aug 2022 05:26) (20 - 20)  SpO2: 94% (30 Aug 2022 05:26) (94% - 99%)    Parameters below as of 30 Aug 2022 05:26  Patient On (Oxygen Delivery Method): nasal cannula                      MICROBIOLOGY:  Clean Catch Clean Catch (Midstream)  08-27-22   >100,000 CFU/ml Gram positive organisms  <10,000 CFU/ml Normal Urogenital ck present  --  --      .Blood Blood-Peripheral  08-27-22   No growth to date.  --  --      .Blood Blood-Peripheral  08-27-22   No growth to date.  --  --              v    Rapid RVP Result: NotDetec (08-27 @ 21:05)            RADIOLOGY

## 2022-08-30 NOTE — CONSULT NOTE ADULT - SUBJECTIVE AND OBJECTIVE BOX
HPI:  Pt is an 87yo F w/ PMH of severe tracheobronchomalacia, HTN, HLD, CAD, CVA w/ residual L sided weakness, Intrinsic lung disease, Aortic stenosis, R breast CA s/p mastectomy, L lung collapse and MRSE p/w dyspnea from rehab.   She has a previous hx of resp failure d/t mucus plugging w/ complete L lung collapse and severe tracheobronchomalacia requiring hospitalization at [facility] in March 2022. After discharge to rehab pt was maintained on BiPAP for about one month's time during the evening hours but was subsequently taken off. She was reportedly doing well until until started complaining of an inability to breath and having increased WOB prompting her ED visit 8/28/22.   In the ED she was noted to be tachycardic and tachypneic w/ labs significant for a leukocytosis and positive UA. CXR w/ LLL opacity vs atelectasis. She was administered Vanc, Zosyn, Tylenol and Duoneb treatments     PAST MEDICAL & SURGICAL HISTORY:  MI (myocardial infarction)      Personal history of asbestosis      Gout      UTI (lower urinary tract infection)      ETOH abuse      CVA (cerebral vascular accident)      Tracheobronchomalacia      HTN (hypertension)      HLD (hyperlipidemia)      History of left shoulder fracture      History of benign breast tumor          Toradol (Urticaria (Mild to Mod); Rash (Mild to Mod))      FAMILY HISTORY:  No pertinent family history in first degree relatives      Social History:  - Denies tobacco use  - Former heavy EtOH abuse  - Denies illicit drug use (28 Aug 2022 04:43)    Medications:  albuterol/ipratropium for Nebulization 3 milliLiter(s) Nebulizer every 6 hours  allopurinol 100 milliGRAM(s) Oral daily  atorvastatin 20 milliGRAM(s) Oral at bedtime  cyanocobalamin 1000 MICROGram(s) Oral daily  dextrose 5% + lactated ringers. 1000 milliLiter(s) IV Continuous <Continuous>  escitalopram 10 milliGRAM(s) Oral daily  guaiFENesin Oral Liquid (Sugar-Free) 300 milliGRAM(s) Oral every 6 hours  HYDROmorphone  Injectable 0.25 milliGRAM(s) IV Push every 8 hours PRN Severe Pain (7 - 10)  levETIRAcetam  IVPB 750 milliGRAM(s) IV Intermittent every 12 hours  levothyroxine Injectable 37.5 MICROGram(s) IV Push <User Schedule>  piperacillin/tazobactam IVPB.. 3.375 Gram(s) IV Intermittent every 8 hours  sodium chloride 3%  Inhalation 4 milliLiter(s) Inhalation every 12 hours  traMADol 50 milliGRAM(s) Oral two times a day PRN Severe Pain (7 - 10)    Labs:  Complete Blood Count + Automated Diff in AM (08.28.22 @ 05:23)   WBC Count: 13.93 K/uL   RBC Count: 3.15 M/uL   Hemoglobin: 9.6 g/dL   Hematocrit: 30.6 %   Mean Cell Volume: 97.1 fl   Mean Cell Hemoglobin: 30.5 pg   Mean Cell Hemoglobin Conc: 31.4 gm/dL     Red Cell Distrib Width: 15.5 %   Platelet Count - Automated: 296 K/uL   Auto Neutrophil #: 12.30 K/uL   Auto Lymphocyte #: 0.88 K/uL   Auto Monocyte #: 0.60 K/uL   Auto Eosinophil #: 0.00 K/uL   Auto Basophil #: 0.03 K/uL   Auto Neutrophil %: 88.3: Differential percentages must be correlated with absolute numbers for   clinical significance. %   Auto Lymphocyte %: 6.3 %   Auto Monocyte %: 4.3 %   Auto Eosinophil %: 0.0 %   Auto Basophil %: 0.2 %   Auto Immature Granulocyte %: 0.9: (Includes meta, myelo and promyelocytes) %   Nucleated RBC: 0 /100 WBCs     Comprehensive Metabolic Panel in AM (08.28.22 @ 05:23)   Sodium, Serum: 130 mmol/L   Potassium, Serum: 5.7: Moderate Hemolysis, results may be falsely elevated mmol/L   Chloride, Serum: 93 mmol/L   Carbon Dioxide, Serum: 20 mmol/L   Anion Gap, Serum: 17 mmol/L   Blood Urea Nitrogen, Serum: 44 mg/dL   Creatinine, Serum: 0.69 mg/dL   Glucose, Serum: 128 mg/dL   Calcium, Total Serum: 9.8 mg/dL   Protein Total, Serum: 7.6 g/dL   Albumin, Serum: 4.0 g/dL   Bilirubin Total, Serum: 0.2 mg/dL   Alkaline Phosphatase, Serum: 137 U/L   Aspartate Aminotransferase (AST/SGOT): 37: Moderate Hemolysis, results may be falsely elevated U/L   Alanine Aminotransferase (ALT/SGPT): 16: Moderate Hemolysis, results may be falsely elevated U/L   eGFR: 85: The estimated glomerular filtration rate (eGFR) is calculated using the   2021 CKD-EPI creatinine equation, which does not have a coefficient for   race and is validated in individuals 18 years of age and older (N Engl J   Med 2021; 385:1782-0549). Creatinine-based eGFR may be inaccurate in   various situations including but not limited to extremes of muscle mass,   altered dietary protein intake, or medications that affect renal tubular   creatinine secretion. mL/min/1.73m2             Radiology:     < from: CT Chest No Cont (08.29.22 @ 09:06) >  IMPRESSION:  Bilateral dependent atelectasis. Interval resolution of left lower lobe   complete collapse.    Multiple lytic bone lesions, predominantly in the thoracic vertebral   bodies which is concerning for metastatic disease. Unclear if there is   any spinal canal extension. Recommend MRI of the thoracic spine for   further evaluation.    < end of copied text >< from: CT Abdomen and Pelvis w/ IV Cont (03.16.22 @ 22:41) >  BONES: Degenerative changes of the spine. T11 and L1 vertebral body   compression fracture deformities, not significantly changed.    IMPRESSION:  Interval decrease in size of a fluid collection in the region of the   previously perforated appendicitis with slight increase in mild adjacent   inflammatory change for which a superimposed acute infection cannot be   excluded.    < end of copied text >  < from: CT Chest No Cont (01.31.22 @ 13:55) >  BONES/SOFT TISSUES: Status post right mastectomy. Status post left   humerus ORIF.    < end of copied text >    < from: CT Abdomen and Pelvis No Cont (12.30.21 @ 14:12) >  BONES: Degenerative changes. T11 compression deformity, unchanged.   Redemonstration of an indeterminate lytic lesion in the right acetabulum.    < end of copied text >        ROS:  No pain, no fever  No lumps in neck or pain  No Sob or chest pain  No palpitations   No abdominal pain. No change in bowel habit. No blood in stools  No weakness in extremities  No leg swelling  Rest of comprehensive ROS was negative    Vital Signs Last 24 Hrs  T(C): 36.3 (30 Aug 2022 05:26), Max: 36.6 (30 Aug 2022 01:46)  T(F): 97.4 (30 Aug 2022 05:26), Max: 97.8 (30 Aug 2022 01:46)  HR: 94 (30 Aug 2022 05:26) (81 - 97)  BP: 100/70 (30 Aug 2022 05:26) (100/70 - 130/54)  BP(mean): --  RR: 20 (30 Aug 2022 05:26) (20 - 20)  SpO2: 94% (30 Aug 2022 05:26) (94% - 99%)    Parameters below as of 30 Aug 2022 05:26  Patient On (Oxygen Delivery Method): nasal cannula        Physical Exam:  Patient comfortable  AXOX3  Neck supple, no LN's  Chest: bilateral breath sounds, no wheeze or rales  CVS: regular heart rate without murmur  Abdomen: soft, BS+, no massess or organomegaly  CNS: no gross deficit  Musculoskeletal: Normal range of motion  Skin: no rash       HPI:  Pt is an 85yo F w/ PMH of severe tracheobronchomalacia, HTN, HLD, CAD, CVA w/ residual L sided weakness, Intrinsic lung disease, Aortic stenosis, R breast CA s/p mastectomy, L lung collapse and MRSE p/w dyspnea from rehab.   She has a previous hx of resp failure d/t mucus plugging w/ complete L lung collapse and severe tracheobronchomalacia requiring hospitalization at [facility] in March 2022. After discharge to rehab pt was maintained on BiPAP for about one month's time during the evening hours but was subsequently taken off. She was reportedly doing well until until started complaining of an inability to breath and having increased WOB prompting her ED visit 8/28/22.   In the ED she was noted to be tachycardic and tachypneic w/ labs significant for a leukocytosis and positive UA. CXR w/ LLL opacity vs atelectasis. She was administered Vanc, Zosyn, Tylenol and Duoneb treatments     PAST MEDICAL & SURGICAL HISTORY:  MI (myocardial infarction)      Personal history of asbestosis      Gout      UTI (lower urinary tract infection)      ETOH abuse      CVA (cerebral vascular accident)      Tracheobronchomalacia      HTN (hypertension)      HLD (hyperlipidemia)      History of left shoulder fracture      History of benign breast tumor          Toradol (Urticaria (Mild to Mod); Rash (Mild to Mod))      FAMILY HISTORY:  No pertinent family history in first degree relatives. No h/o breast cancer      Social History:  - H/o former smoking, unable to provided details  - Former heavy EtOH abuse  - Denies illicit drug use (28 Aug 2022 04:43)    Medications:  albuterol/ipratropium for Nebulization 3 milliLiter(s) Nebulizer every 6 hours  allopurinol 100 milliGRAM(s) Oral daily  atorvastatin 20 milliGRAM(s) Oral at bedtime  cyanocobalamin 1000 MICROGram(s) Oral daily  dextrose 5% + lactated ringers. 1000 milliLiter(s) IV Continuous <Continuous>  escitalopram 10 milliGRAM(s) Oral daily  guaiFENesin Oral Liquid (Sugar-Free) 300 milliGRAM(s) Oral every 6 hours  HYDROmorphone  Injectable 0.25 milliGRAM(s) IV Push every 8 hours PRN Severe Pain (7 - 10)  levETIRAcetam  IVPB 750 milliGRAM(s) IV Intermittent every 12 hours  levothyroxine Injectable 37.5 MICROGram(s) IV Push <User Schedule>  piperacillin/tazobactam IVPB.. 3.375 Gram(s) IV Intermittent every 8 hours  sodium chloride 3%  Inhalation 4 milliLiter(s) Inhalation every 12 hours  traMADol 50 milliGRAM(s) Oral two times a day PRN Severe Pain (7 - 10)    Labs:  Complete Blood Count + Automated Diff in AM (08.28.22 @ 05:23)   WBC Count: 13.93 K/uL   RBC Count: 3.15 M/uL   Hemoglobin: 9.6 g/dL   Hematocrit: 30.6 %   Mean Cell Volume: 97.1 fl   Mean Cell Hemoglobin: 30.5 pg   Mean Cell Hemoglobin Conc: 31.4 gm/dL     Red Cell Distrib Width: 15.5 %   Platelet Count - Automated: 296 K/uL   Auto Neutrophil #: 12.30 K/uL   Auto Lymphocyte #: 0.88 K/uL   Auto Monocyte #: 0.60 K/uL   Auto Eosinophil #: 0.00 K/uL   Auto Basophil #: 0.03 K/uL   Auto Neutrophil %: 88.3: Differential percentages must be correlated with absolute numbers for   clinical significance. %   Auto Lymphocyte %: 6.3 %   Auto Monocyte %: 4.3 %   Auto Eosinophil %: 0.0 %   Auto Basophil %: 0.2 %   Auto Immature Granulocyte %: 0.9: (Includes meta, myelo and promyelocytes) %   Nucleated RBC: 0 /100 WBCs     Comprehensive Metabolic Panel in AM (08.28.22 @ 05:23)   Sodium, Serum: 130 mmol/L   Potassium, Serum: 5.7: Moderate Hemolysis, results may be falsely elevated mmol/L   Chloride, Serum: 93 mmol/L   Carbon Dioxide, Serum: 20 mmol/L   Anion Gap, Serum: 17 mmol/L   Blood Urea Nitrogen, Serum: 44 mg/dL   Creatinine, Serum: 0.69 mg/dL   Glucose, Serum: 128 mg/dL   Calcium, Total Serum: 9.8 mg/dL   Protein Total, Serum: 7.6 g/dL   Albumin, Serum: 4.0 g/dL   Bilirubin Total, Serum: 0.2 mg/dL   Alkaline Phosphatase, Serum: 137 U/L   Aspartate Aminotransferase (AST/SGOT): 37: Moderate Hemolysis, results may be falsely elevated U/L   Alanine Aminotransferase (ALT/SGPT): 16: Moderate Hemolysis, results may be falsely elevated U/L   eGFR: 85: The estimated glomerular filtration rate (eGFR) is calculated using the   2021 CKD-EPI creatinine equation, which does not have a coefficient for   race and is validated in individuals 18 years of age and older (N Engl J   Med 2021; 385:4363-3416). Creatinine-based eGFR may be inaccurate in   various situations including but not limited to extremes of muscle mass,   altered dietary protein intake, or medications that affect renal tubular   creatinine secretion. mL/min/1.73m2             Radiology:     < from: CT Chest No Cont (08.29.22 @ 09:06) >  IMPRESSION:  Bilateral dependent atelectasis. Interval resolution of left lower lobe   complete collapse.    Multiple lytic bone lesions, predominantly in the thoracic vertebral   bodies which is concerning for metastatic disease. Unclear if there is   any spinal canal extension. Recommend MRI of the thoracic spine for   further evaluation.    < end of copied text >< from: CT Abdomen and Pelvis w/ IV Cont (03.16.22 @ 22:41) >  BONES: Degenerative changes of the spine. T11 and L1 vertebral body   compression fracture deformities, not significantly changed.    IMPRESSION:  Interval decrease in size of a fluid collection in the region of the   previously perforated appendicitis with slight increase in mild adjacent   inflammatory change for which a superimposed acute infection cannot be   excluded.    < end of copied text >  < from: CT Chest No Cont (01.31.22 @ 13:55) >  BONES/SOFT TISSUES: Status post right mastectomy. Status post left   humerus ORIF.    < end of copied text >    < from: CT Abdomen and Pelvis No Cont (12.30.21 @ 14:12) >  BONES: Degenerative changes. T11 compression deformity, unchanged.   Redemonstration of an indeterminate lytic lesion in the right acetabulum.    < end of copied text >    < from: CT Abdomen and Pelvis w/ IV Cont (12.19.21 @ 21:40) >  PANCREAS: Hypodense cystic appearing lesion measuring 1.0 cm in the   pancreatic tail (series 3 image 108), which is new since 7/2/2017, but is   unchanged since 5/28/2021. No main pancreatic duct dilation.  ADRENALS: Within normal limits.  KIDNEYS/URETERS: Within normal limits.    BLADDER: Within normal limits.  REPRODUCTIVEORGANS: Thickening of the endometrial complex at the fundus,   measuring up to 1.6 cm.    BOWEL:  Fluid collection within the right lower quadrant measuring 7.3 x 3.9 x   5.7 cm (series 3 image 209) with surrounding fatty infiltration. The   collection is associated with the tip of the appendix, which is   indistinct, with findings concerning for perforated appendicitis. No   pneumoperitoneum. There is a punctate 1 to 2 mm calcific density within   the collection (series 3 image 12), which may represent an appendicolith.    Small hiatal hernia. Percutaneous gastrostomy tube, the balloon located   within the gastric antrum. Duodenal diverticulum arising from the third   portion of the duodenum. Colonic diverticulosis. No bowel obstruction.    PERITONEUM: Right lower quadrant fluid collection, as described above.  VESSELS: Atherosclerotic changes.  RETROPERITONEUM/LYMPH NODES: No lymphadenopathy.  ABDOMINAL WALL: Small fat-containing umbilical hernia on a background of   diastases recti. Percutaneous gastrostomy tube.  BONES: Fixation hardware of the proximal left humerus. Degenerative   changes. Osseous demineralization. Mild to moderate loss of height of the   T11 and L1 vertebral bodies, unchanged since 2017.    Soft tissue density measuring 2.8 x 1.8 cm centered within the right   posterior medial acetabulum with associated erosion of the cortex (series   4 image 794). Findings are new since 7/2/2017.    IMPRESSION:  Findings concerning for perforated appendicitis with a right lower   quadrant collection associated with the appendiceal tip.    Complete atelectasis of the left lower lobe with debris occupying the   left lower lobe airways. An underlying pneumonia is not excluded.    Indeterminate lesion within the right acetabulum with associated osseous   erosion. Malignancy is not excluded.    Cholelithiasis and suspected choledocholithiasis. No biliary duct   dilation.    Thickening of the endometrial complex. Pelvic ultrasound may be performed   for further assessment.    Pancreatic tail lesion measuring 1 cm, possibly a side branch IPMN. This   can be further assessed with nonemergent contrast enhanced abdominal   MRI/MRCP.    Findings were discussed with Dr. Wade 12/20/2021 9:14 AM by Dr. Alan with readback confirmation.    --- End of Report ---        < end of copied text >      ROS:  No pain, no fever  No lumps in neck or pain  No Sob or chest pain  No palpitations   No abdominal pain. No change in bowel habit. No blood in stools  No weakness in extremities  No leg swelling  Rest of comprehensive ROS was negative    Vital Signs Last 24 Hrs  T(C): 36.3 (30 Aug 2022 05:26), Max: 36.6 (30 Aug 2022 01:46)  T(F): 97.4 (30 Aug 2022 05:26), Max: 97.8 (30 Aug 2022 01:46)  HR: 94 (30 Aug 2022 05:26) (81 - 97)  BP: 100/70 (30 Aug 2022 05:26) (100/70 - 130/54)  BP(mean): --  RR: 20 (30 Aug 2022 05:26) (20 - 20)  SpO2: 94% (30 Aug 2022 05:26) (94% - 99%)    Parameters below as of 30 Aug 2022 05:26  Patient On (Oxygen Delivery Method): nasal cannula        Physical Exam:  Patient comfortable  AXOX3  Neck supple, no LN's  Chest: bilateral breath sounds, no wheeze or rales  CVS: regular heart rate without murmur  Abdomen: soft, BS+, no massess or organomegaly  CNS: no gross deficit  Musculoskeletal: Normal range of motion  Skin: no rash

## 2022-08-31 NOTE — PROGRESS NOTE ADULT - SUBJECTIVE AND OBJECTIVE BOX
***************************************************************  Jaylen Gastelum, PGY1  Internal Medicine   pager:  [facility]: [phone] [facility]: [phone]  ***************************************************************    FRANKI MEHTA  87y  MRN: [id number]    Patient is a 87y old  Female who presents with a chief complaint of SOB (30 Aug 2022 11:20)      Interval/Overnight Events: no events ON.     Subjective: Pt seen and examined at bedside. Denies fever, CP, SOB, abn pain, N/V, dysuria. Tolerating diet.      MEDICATIONS  (STANDING):  acetaminophen    Suspension .. 800 milliGRAM(s) Oral every 6 hours  albuterol/ipratropium for Nebulization 3 milliLiter(s) Nebulizer every 6 hours  allopurinol 100 milliGRAM(s) Oral daily  atorvastatin 20 milliGRAM(s) Oral at bedtime  cyanocobalamin 1000 MICROGram(s) Oral daily  dextrose 5% + lactated ringers. 1000 milliLiter(s) (80 mL/Hr) IV Continuous <Continuous>  diltiazem    Tablet 30 milliGRAM(s) Oral three times a day  enoxaparin Injectable 40 milliGRAM(s) SubCutaneous every 24 hours  escitalopram 10 milliGRAM(s) Oral daily  guaiFENesin Oral Liquid (Sugar-Free) 300 milliGRAM(s) Oral every 6 hours  HYDROmorphone  Injectable 0.25 milliGRAM(s) IV Push once  levETIRAcetam  Solution 750 milliGRAM(s) Oral two times a day  levothyroxine 75 MICROGram(s) Oral daily  piperacillin/tazobactam IVPB.. 3.375 Gram(s) IV Intermittent every 8 hours  psyllium Powder 1 Packet(s) Oral daily  sodium chloride 3%  Inhalation 4 milliLiter(s) Inhalation every 12 hours    MEDICATIONS  (PRN):  HYDROmorphone  Injectable 0.25 milliGRAM(s) IV Push every 8 hours PRN Severe Pain (7 - 10)  traMADol 50 milliGRAM(s) Oral two times a day PRN Severe Pain (7 - 10)      Objective:    Vitals: Vital Signs Last 24 Hrs  T(C): 36.9 (08-31-22 @ 05:10), Max: 36.9 (08-30-22 @ 20:02)  T(F): 98.4 (08-31-22 @ 05:10), Max: 98.5 (08-30-22 @ 20:02)  HR: 92 (08-31-22 @ 05:10) (92 - 102)  BP: 151/56 (08-31-22 @ 05:10) (108/54 - 151/56)  BP(mean): --  RR: 20 (08-31-22 @ 05:10) (18 - 20)  SpO2: 100% (08-31-22 @ 05:10) (97% - 100%)                I&O's Summary    29 Aug 2022 07:01  -  30 Aug 2022 07:00  --------------------------------------------------------  IN: 1150 mL / OUT: 800 mL / NET: 350 mL    30 Aug 2022 07:01  -  31 Aug 2022 06:12  --------------------------------------------------------  IN: 1360 mL / OUT: 550 mL / NET: 810 mL        PHYSICAL EXAM:  GENERAL: NAD, obese  HEAD:  Atraumatic, Normocephalic  EYES: EOMI, conjunctiva and sclera clear  CHEST/LUNG: +wheezes b/l. limited anterior exam due to body habitus  HEART: S1 S2 RRR. Limited exam due to body habitus  ABDOMEN: Soft, Nontender, Nondistended;   SKIN: No rashes or lesions  NERVOUS SYSTEM:  Alert & Oriented X3, no focal deficits. Easily distractible.      LABS:  08-30    139  |  100  |  31<H>  ----------------------------<  154<H>  3.4<L>   |  25  |  0.74  08-28    132<L>  |  94<L>  |  44<H>  ----------------------------<  141<H>  4.9   |  22  |  0.72    Ca    9.7      30 Aug 2022 12:12  Ca    9.7      28 Aug 2022 06:51  Phos  3.4     08-30  Mg     2.2     08-30        PT/INR - ( 30 Aug 2022 12:12 )   PT: 13.8 sec;   INR: 1.20 ratio         PTT - ( 30 Aug 2022 12:12 )  PTT:32.1 sec                                        8.9    10.03 )-----------( 249      ( 30 Aug 2022 12:12 )             29.7     CAPILLARY BLOOD GLUCOSE          RADIOLOGY & ADDITIONAL TESTS:    Imaging Personally Reviewed:  [x ] YES  [ ] NO    Consultants involved in case:   Consultant(s) Notes Reviewed:  [ x] YES  [ ] NO:   Care Discussed with Consultants/Other Providers [x ] YES  [ ] NO

## 2022-08-31 NOTE — SWALLOW FEES ASSESSMENT ADULT - ORAL PHASE
Delayed oral transit Premature spillage to the hypopharynx Delayed oral transit; initially, swallow trigger for mildly thick liquids is timely, however, as study progresses, there is premature spillage to the pyriform sinuses

## 2022-08-31 NOTE — PROGRESS NOTE ADULT - ASSESSMENT
ASSESSMENT:    Asked by the medical housestaff to evaluate the patient in pulmonary consultation. The patient is a 87 year old gentlewoman, lifelong non-smoker, well known to me without history of intrinsic lung disease. 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 She also has a history of HTN, HLD, CAD s/p MI with preserved LVEF and moderate aortic stenosis. The patient was admitted to [facility] in December 2021 with fever and abdominal pain. She was found to have perforated appendicitis with abscess formation. She was treated with tube drainage and antibiotics for a polymicrobial infection. Hospital course was complicated by respiratory failure due to mucous plugging with complete collapse of the left lung. She underwent several bronchoscopies for pulmonary toilet and was found to have severe tracheobronchomalacia. She was treated with bronchodilators, mucolytic nebs, chest vest and nocturnal AVAPS with expansion of the left upper lobe and some reexpansion of the left lower lobe. She was admitted in March with hematochezia with a surprisingly stable respiratory status. The left lower lobe was well aerated on a CT scan of the abdomen and pelvis with only bibasilar subsegmental atelectasis - there was scattered sigmoid diverticulosis without evidence of diverticulitis - interval decrease in size of a fluid collection in the region of the previously perforated appendicitis measuring 2.8 x 1.5 cm previously measuring 4.0 x 2.2 cm - slight increase in mild adjacent inflammatory change. The GI bleed was treated conservatively.  She has been doing "well" at rehab although she remains bedbound and NPO. She is no longer wearing nocturnal NIV.  She returns with shortness of breath in the setting of back, neck and left lower extremity pain. ENT evaluation -> no upper airway pathology. The patient had been placed on BIPAP for mild acute hypercapnic respiratory failure that has resolved and to buttress open the airways. She is awake and alert. She currently has no shortness of breath or hypoxemia on room air. No cough, sputum production, hemoptysis, chest congestion or wheeze. No fevers, chills or sweats. No chest pain/pressure or palpitations.     the patient returns from [facility] rehab with dyspnea with mild hypercapnic respiratory failure in the setting of neck, back and left leg pain (chronically contracted) - the respiratory acidosis has resolved with NIV support and the patient is now without shortness of breath or hypoxemia on room air - the patient has severe tracheobronchomalacia that has resulted in mucous plugging with complete left lung collapse requiring several bronchoscopies for pulmonary toilet - the patient was felt not to be a candidate for surgery for the tracheobronchomalacia and stenting was felt to have more risk than benefit - she has not had significant mucous plugging for ~ 6 months - CXR reveals a poor inspiratory effort and subsegmental left lower lobe atelectasis - it is unlikely that she has pneumonia despite the mild leukocytosis    PLAN/RECOMMENDATIONS:    stable oxygenation on room air  no longer using BIPAP for sleep   CT scan -> bilateral dependent atelectasis - resolution of left lower lobe complete collapse - multiple lytic bone lesions predominantly in the thoracic vertebral bodies which is concerning for metastatic disease   no indication for bronchoscopy at this time  humeral radiograph -> redemonstrated focal lytic lesion in proximal medial humeral diaphysis with small area of permeative cortical destruction.  it is likely that the patient has metastatic breast cancer  incentive spirometry  acapella device  observe off antibiotics  albuterol/atrovent nebs q6h  hypertonic saline nebs q12h  guaifenesin 300mg 4 times daily via PEG  puree diet with moderately thickened fluids - MBS tomorrow  hyponatremia has resolved  treatment of HTN, HLD, CAD, aortic stenosis per cardiology  DVT prophylaxis  analgesics    Thank you for the courtesy of this referral. Plan of care discussed with the patient and her sone at bedside and with the medical housestaff.    Kennedy Marcano MD, [facility]  [phone]  Pulmonary Medicine

## 2022-08-31 NOTE — SWALLOW FEES ASSESSMENT ADULT - ROSENBEK'S PENETRATION ASPIRATION SCALE
(1) no aspiration, material does not enter airway (3) material remains above the vocal cords, visible residue remains (penetration) PA Scale Score does not reflect suspected aspiration event/(5) material contacts vocal cords, visible residue remains (penetration) **PA Scale Score does not reflect suspected aspiration event/(5) material contacts vocal cords, visible residue remains (penetration)

## 2022-08-31 NOTE — SWALLOW FEES ASSESSMENT ADULT - RECOMMENDED FEEDING/EATING TECHNIQUES
provide PO at a slow rate/allow for swallow between intakes/maintain upright posture during/after eating for 30 mins/small sips/bites

## 2022-08-31 NOTE — PROGRESS NOTE ADULT - ASSESSMENT
89    year old female    h/o hemorrhagic CVA, has  PEG,  HTN, MI,   HLD, gout, right ca  breast   right Ca breast. s/p mastectomy in 2019,  s/p  sentinel node  localization.  4/4,  were  negative  peg dislodged.  IR   replacements  on 1/3/22  s/p rrt  . in past,  for hypoxia. cxr with complete  collapsed  of  left lung, needing broch,   s/ p  bronchoscopy , pt  with  tracheomalacia  and  collapsed  airways,  makes  this a  difficult  situation, as there is no good rx for this     h/o bacteremia. on   pna, perforated  appendicitis,  ?  mets  to  acetabulum, was  seen by dr pacheco   surg/ IR  and  oncology eval dr pacheco   sa w pt.  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 this will not alter rx. pt  will need   extended  course  of ab   on iv  vanco and ertapenem.  till  2/9/.22  ID dr reagan, and per  surg  and  IR  eval,  no intervention   CT  1/20/22, no PE. collection in right side   s/p  rectal bleed.        *  admitted with sob    ENT eval w/ laryngoscopy notable for vocal cords mobile and intact, no edema, upper airway patent   Duonebs and hypertonic saline for airway clearance    Zosyn empiric for pna    *   s/p cva, has  PEG,  npo  ,   on  synthroid, Keppra  ,  *  HTN, on meds  per  card  *  h/o  ca breast. /, s/p  R  mastectomy  *   obesity, bmi  was   41, now  is  30       c/c left  leg contracture ,  remains  bed  bound. functional  paraplegias  needs  assistance  for  all her  adl's   *  pt  with  h/o  tracheomalacia  and  collapsed  airways,      given pt;s  mlple co morbidities,  pt is  at risk for  re admissions     on nocturnal  bipap    difficult  situation, a s there  is no  good  rx  for  pt's  recurrent lung collapse hypoxia    h/o  of  chronic    mild  tachycardia   re  admission  likely,, given her  airways, and  propensity  for  lung collapse   pt  is  oxygen dependent /  now  on N/ C       on iv  zosyn. edwin to  5  days      G  tube  replacement   by IR/    Feest  by  swallow team    *   CT chest. lytic  lesions, T  spine/  ribs/ humerus/  oncoloigy  magnus pacheco.  suspect mets  from ?    ca breast/  h/o  mastectomy       called son. Sy, updated/ oncologist  has  also  discussed  with  son,  futility of  pursuing  bx. a s pt  is  not an ideal  candidate  for  chemo    son wishes  to  speak with   siblings  and  then  will get back  to  dr pacheco      MRI T  spine , pending/  CT  A.p       per  son, pt is  full code

## 2022-08-31 NOTE — DISCHARGE NOTE PROVIDER - NSDCFUSCHEDAPPT_GEN_ALL_CORE_FT
Fred Julio  [facility]  [facility] [address] [facility]  Scheduled Appointment: 10/04/2022

## 2022-08-31 NOTE — SWALLOW FEES ASSESSMENT ADULT - SLP GENERAL OBSERVATIONS
Pt encountered in bed, awake/ alert, on room air. VSS. Intermittent wet vocal quality/ stridor at baseline and throughout exam.

## 2022-08-31 NOTE — PROGRESS NOTE ADULT - SUBJECTIVE AND OBJECTIVE BOX
Interventional Radiology Follow-Up Note.    Patient seen and examined @ bedside.    This is a 87y Female s/p gastrostomy tube reinsertion on 8/30/22 in Interventional Radiology.     No complaint offered.      Medication:  diltiazem    Tablet: (08-31)  diltiazem    Tablet: (08-29)  enoxaparin Injectable: (08-29)  enoxaparin Injectable: (08-30)  piperacillin/tazobactam IVPB..: (08-31)    Vitals:  T(F): 98.4, Max: 98.5 (20:02)  HR: 92  BP: 151/56  RR: 20  SpO2: 100%    Physical Exam:  General: Nontoxic, in NAD, on venturi mask  Abdomen: soft, NTND.   G-tube: Dressing clean, dry, intact. no leaking seen.          LABS:  Na: 139 (08-30 @ 12:12)  K: 3.4 (08-30 @ 12:12)  Cl: 100 (08-30 @ 12:12)  CO2: 25 (08-30 @ 12:12)  BUN: 31 (08-30 @ 12:12)  Cr: 0.74 (08-30 @ 12:12)  Glu: 154(08-30 @ 12:12)    Hgb: 8.9 (08-30 @ 12:12)  Hct: 29.7 (08-30 @ 12:12)  WBC: 10.03 (08-30 @ 12:12)  Plt: 249 (08-30 @ 12:12)    INR: 1.20 08-30-22 @ 12:12  PTT: 32.1 08-30-22 @ 12:12                                Assessment/Plan:  87y Female with chronic indwelling 16Fr G-tube last replaced on 1/2022 now displaced overnight 8/29, 15Fr flanagan was placed in the tract.   Pt most recently s/p gastrostomy tube reinsertion on 8/30/22 with IR.     - OK to use g-tube  - Change dressing q3 days or when dressing is saturated.  - Trend vs/labs  - Continue global management per primary team  - Outpatient follow up: Pt can make an appointment with IR by calling the IR booking office at [phone]; recommend IR follow every 4 months for routine exchange of g-tube.    Please call IR at [phone] with any questions, concerns, or issues regarding above.    Also available on [employer] TEAMS.   Interventional Radiology Follow-Up Note.    Patient seen and examined @ bedside.    This is a 87y Female s/p gastrostomy tube reinsertion on 8/30/22 in Interventional Radiology.     No complaint offered.      Medication:  diltiazem    Tablet: (08-31)  diltiazem    Tablet: (08-29)  enoxaparin Injectable: (08-29)  enoxaparin Injectable: (08-30)  piperacillin/tazobactam IVPB..: (08-31)    Vitals:  T(F): 98.4, Max: 98.5 (20:02)  HR: 92  BP: 151/56  RR: 20  SpO2: 100%    Physical Exam:  General: Nontoxic, in NAD, on venturi mask  Abdomen: soft, NTND.   G-tube: Dressing clean, dry, intact. no leaking seen.          LABS:  Na: 139 (08-30 @ 12:12)  K: 3.4 (08-30 @ 12:12)  Cl: 100 (08-30 @ 12:12)  CO2: 25 (08-30 @ 12:12)  BUN: 31 (08-30 @ 12:12)  Cr: 0.74 (08-30 @ 12:12)  Glu: 154(08-30 @ 12:12)    Hgb: 8.9 (08-30 @ 12:12)  Hct: 29.7 (08-30 @ 12:12)  WBC: 10.03 (08-30 @ 12:12)  Plt: 249 (08-30 @ 12:12)    INR: 1.20 08-30-22 @ 12:12  PTT: 32.1 08-30-22 @ 12:12                                Assessment/Plan:  87y Female with chronic indwelling 16Fr G-tube last replaced on 1/2022 now displaced overnight 8/29, 15Fr flanagan was placed in the tract.   Pt most recently s/p gastrostomy tube reinsertion on 8/30/22 with IR.     - OK to use g-tube  - Change dressing q3 days or when dressing is saturated.  - Trend vs/labs  - Continue global management per primary team  - Outpatient follow up: Pt can make an appointment with IR by calling the IR booking office at [phone]; recommend IR follow every 4 months for routine exchange of g-tube.  - IR will sign off    Please call IR at [phone] with any questions, concerns, or issues regarding above.    Also available on [employer] TEAMS.

## 2022-08-31 NOTE — PROGRESS NOTE ADULT - SUBJECTIVE AND OBJECTIVE BOX
Pt is an 87yo F w/ PMH of severe tracheobronchomalacia, HTN, HLD, CAD, CVA w/ residual L sided weakness, Intrinsic lung disease, Aortic stenosis, R breast CA s/p mastectomy, L lung collapse and MRSE p/w dyspnea from rehab.   She has a previous hx of resp failure d/t mucus plugging w/ complete L lung collapse and severe tracheobronchomalacia requiring hospitalization at [facility] in March 2022. After discharge to rehab pt was maintained on BiPAP for about one month's time during the evening hours but was subsequently taken off. She was reportedly doing well until until started complaining of an inability to breath and having increased WOB prompting her ED visit 8/28/22.   In the ED she was noted to be tachycardic and tachypneic w/ labs significant for a leukocytosis and positive UA. CXR w/ LLL opacity vs atelectasis. She was administered Vanc, Zosyn, Tylenol and Duoneb treatments   PAST MEDICAL & SURGICAL HISTORY:  MI (myocardial infarction)      Personal history of asbestosis      Gout      UTI (lower urinary tract infection)      ETOH abuse      CVA (cerebral vascular accident)      Tracheobronchomalacia      HTN (hypertension)      HLD (hyperlipidemia)      History of left shoulder fracture      History of benign breast tumor        Allergies    Toradol (Urticaria (Mild to Mod); Rash (Mild to Mod))    Intolerances      Social History:  - Denies tobacco use  - Former heavy EtOH abuse  - Denies illicit drug use (28 Aug 2022 04:43)    Medications:  acetaminophen    Suspension .. 800 milliGRAM(s) Oral every 6 hours  albuterol/ipratropium for Nebulization 3 milliLiter(s) Nebulizer every 6 hours  allopurinol 100 milliGRAM(s) Oral daily  atorvastatin 20 milliGRAM(s) Oral at bedtime  cyanocobalamin 1000 MICROGram(s) Oral daily  dextrose 5% + lactated ringers. 1000 milliLiter(s) IV Continuous <Continuous>  diltiazem    Tablet 30 milliGRAM(s) Oral three times a day  enoxaparin Injectable 40 milliGRAM(s) SubCutaneous every 24 hours  escitalopram 10 milliGRAM(s) Oral daily  guaiFENesin Oral Liquid (Sugar-Free) 300 milliGRAM(s) Oral every 6 hours  HYDROmorphone  Injectable 0.25 milliGRAM(s) IV Push every 8 hours PRN Severe Pain (7 - 10)  HYDROmorphone  Injectable 0.25 milliGRAM(s) IV Push once  levETIRAcetam  Solution 750 milliGRAM(s) Oral two times a day  levothyroxine 75 MICROGram(s) Oral daily  piperacillin/tazobactam IVPB.. 3.375 Gram(s) IV Intermittent every 8 hours  psyllium Powder 1 Packet(s) Oral daily  sodium chloride 3%  Inhalation 4 milliLiter(s) Inhalation every 12 hours  traMADol 50 milliGRAM(s) Oral two times a day PRN Severe Pain (7 - 10)    Labs:  CBC Full  -  ( 30 Aug 2022 12:12 )  WBC Count : 10.03 K/uL  RBC Count : 2.98 M/uL  Hemoglobin : 8.9 g/dL  Hematocrit : 29.7 %  Platelet Count - Automated : 249 K/uL  Mean Cell Volume : 99.7 fl  Mean Cell Hemoglobin : 29.9 pg  Mean Cell Hemoglobin Concentration : 30.0 gm/dL  Auto Neutrophil # : x  Auto Lymphocyte # : x  Auto Monocyte # : x  Auto Eosinophil # : x  Auto Basophil # : x  Auto Neutrophil % : x  Auto Lymphocyte % : x  Auto Monocyte % : x  Auto Eosinophil % : x  Auto Basophil % : x    08-30    139  |  100  |  31<H>  ----------------------------<  154<H>  3.4<L>   |  25  |  0.74    Ca    9.7      30 Aug 2022 12:12  Phos  3.4     08-30  Mg     2.2     08-30        Radiology:             ROS:  Patient comfortable without distress  No SOB or chest pain  No palpitation  No abdominal pain, diarrhaea or constipation  No weakness of extremities  No skin changes or swelling of legs  Rest of the comprehensive ROS was negative  Vital Signs Last 24 Hrs  T(C): 36.9 (31 Aug 2022 05:10), Max: 36.9 (30 Aug 2022 20:02)  T(F): 98.4 (31 Aug 2022 05:10), Max: 98.5 (30 Aug 2022 20:02)  HR: 92 (31 Aug 2022 05:10) (92 - 102)  BP: 151/56 (31 Aug 2022 05:10) (108/54 - 151/56)  BP(mean): --  RR: 20 (31 Aug 2022 05:10) (18 - 20)  SpO2: 100% (31 Aug 2022 05:10) (97% - 100%)    Parameters below as of 30 Aug 2022 20:02  Patient On (Oxygen Delivery Method): room air        Physical exam:  Patient alert and oriented  No distress  CVS: S1, S2 regular or murmur  Chest: bilateral breath sound without rales  Abdomen: soft, not tender, no organomegaly or masses  CNS: No focal neuro deficit  Musculoskeletal:  Normal range of motion  Skin: No rash    Assessment and Plan: Pt is an 87yo F w/ PMH of severe tracheobronchomalacia, HTN, HLD, CAD, CVA w/ residual L sided weakness, Intrinsic lung disease, Aortic stenosis, R breast CA s/p mastectomy, L lung collapse and MRSE p/w dyspnea from rehab.   She has a previous hx of resp failure d/t mucus plugging w/ complete L lung collapse and severe tracheobronchomalacia requiring hospitalization at [facility] in March 2022. After discharge to rehab pt was maintained on BiPAP for about one month's time during the evening hours but was subsequently taken off. She was reportedly doing well until until started complaining of an inability to breath and having increased WOB prompting her ED visit 8/28/22.   In the ED she was noted to be tachycardic and tachypneic w/ labs significant for a leukocytosis and positive UA. CXR w/ LLL opacity vs atelectasis. She was administered Vanc, Zosyn, Tylenol and Duoneb treatments   PAST MEDICAL & SURGICAL HISTORY:  MI (myocardial infarction)      Personal history of asbestosis      Gout      UTI (lower urinary tract infection)      ETOH abuse      CVA (cerebral vascular accident)      Tracheobronchomalacia      HTN (hypertension)      HLD (hyperlipidemia)      History of left shoulder fracture      History of benign breast tumor        Allergies    Toradol (Urticaria (Mild to Mod); Rash (Mild to Mod))    Intolerances      Social History:  - Denies tobacco use  - Former heavy EtOH abuse  - Denies illicit drug use (28 Aug 2022 04:43)    Medications:  acetaminophen    Suspension .. 800 milliGRAM(s) Oral every 6 hours  albuterol/ipratropium for Nebulization 3 milliLiter(s) Nebulizer every 6 hours  allopurinol 100 milliGRAM(s) Oral daily  atorvastatin 20 milliGRAM(s) Oral at bedtime  cyanocobalamin 1000 MICROGram(s) Oral daily  dextrose 5% + lactated ringers. 1000 milliLiter(s) IV Continuous <Continuous>  diltiazem    Tablet 30 milliGRAM(s) Oral three times a day  enoxaparin Injectable 40 milliGRAM(s) SubCutaneous every 24 hours  escitalopram 10 milliGRAM(s) Oral daily  guaiFENesin Oral Liquid (Sugar-Free) 300 milliGRAM(s) Oral every 6 hours  HYDROmorphone  Injectable 0.25 milliGRAM(s) IV Push every 8 hours PRN Severe Pain (7 - 10)  HYDROmorphone  Injectable 0.25 milliGRAM(s) IV Push once  levETIRAcetam  Solution 750 milliGRAM(s) Oral two times a day  levothyroxine 75 MICROGram(s) Oral daily  piperacillin/tazobactam IVPB.. 3.375 Gram(s) IV Intermittent every 8 hours  psyllium Powder 1 Packet(s) Oral daily  sodium chloride 3%  Inhalation 4 milliLiter(s) Inhalation every 12 hours  traMADol 50 milliGRAM(s) Oral two times a day PRN Severe Pain (7 - 10)    Labs:  CBC Full  -  ( 30 Aug 2022 12:12 )  WBC Count : 10.03 K/uL  RBC Count : 2.98 M/uL  Hemoglobin : 8.9 g/dL  Hematocrit : 29.7 %  Platelet Count - Automated : 249 K/uL  Mean Cell Volume : 99.7 fl  Mean Cell Hemoglobin : 29.9 pg  Mean Cell Hemoglobin Concentration : 30.0 gm/dL  Auto Neutrophil # : x  Auto Lymphocyte # : x  Auto Monocyte # : x  Auto Eosinophil # : x  Auto Basophil # : x  Auto Neutrophil % : x  Auto Lymphocyte % : x  Auto Monocyte % : x  Auto Eosinophil % : x  Auto Basophil % : x    08-30    139  |  100  |  31<H>  ----------------------------<  154<H>  3.4<L>   |  25  |  0.74    Ca    9.7      30 Aug 2022 12:12  Phos  3.4     08-30  Mg     2.2     08-30        Radiology:             ROS:  Patient comfortable without distress  No SOB or chest pain  No palpitation  No abdominal pain, diarrhaea or constipation  No weakness of extremities  No skin changes or swelling of legs  Rest of the comprehensive ROS was negative  Vital Signs Last 24 Hrs  T(C): 36.9 (31 Aug 2022 05:10), Max: 36.9 (30 Aug 2022 20:02)  T(F): 98.4 (31 Aug 2022 05:10), Max: 98.5 (30 Aug 2022 20:02)  HR: 92 (31 Aug 2022 05:10) (92 - 102)  BP: 151/56 (31 Aug 2022 05:10) (108/54 - 151/56)  BP(mean): --  RR: 20 (31 Aug 2022 05:10) (18 - 20)  SpO2: 100% (31 Aug 2022 05:10) (97% - 100%)    Parameters below as of 30 Aug 2022 20:02  Patient On (Oxygen Delivery Method): room air        Physical exam:  Patient alert and oriented  No distress, obese  CVS: S1, S2   Chest: bilateral breath sound without rales  Abdomen: soft, not tender, no organomegaly or masses, PEG +  CNS: No focal neuro deficit  Musculoskeletal:  Normal range of motion  Skin: No rash    Assessment and Plan: Pt is an 85yo F w/ PMH of severe tracheobronchomalacia, HTN, HLD, CAD, CVA w/ residual L sided weakness, Intrinsic lung disease, Aortic stenosis, R breast CA s/p mastectomy, L lung collapse and MRSE p/w dyspnea from rehab.   She has a previous hx of resp failure d/t mucus plugging w/ complete L lung collapse and severe tracheobronchomalacia requiring hospitalization at [facility] in March 2022. After discharge to rehab pt was maintained on BiPAP for about one month's time during the evening hours but was subsequently taken off. She was reportedly doing well until until started complaining of an inability to breath and having increased WOB prompting her ED visit 8/28/22.   In the ED she was noted to be tachycardic and tachypneic w/ labs significant for a leukocytosis and positive UA. CXR w/ LLL opacity vs atelectasis. She was administered Vanc, Zosyn, Tylenol and Duoneb treatments   PAST MEDICAL & SURGICAL HISTORY:  MI (myocardial infarction)      Personal history of asbestosis      Gout      UTI (lower urinary tract infection)      ETOH abuse      CVA (cerebral vascular accident)      Tracheobronchomalacia      HTN (hypertension)      HLD (hyperlipidemia)      History of left shoulder fracture      History of benign breast tumor        Allergies    Toradol (Urticaria (Mild to Mod); Rash (Mild to Mod))    Intolerances      Social History:  - Denies tobacco use  - Former heavy EtOH abuse  - Denies illicit drug use (28 Aug 2022 04:43)    Medications:  acetaminophen    Suspension .. 800 milliGRAM(s) Oral every 6 hours  albuterol/ipratropium for Nebulization 3 milliLiter(s) Nebulizer every 6 hours  allopurinol 100 milliGRAM(s) Oral daily  atorvastatin 20 milliGRAM(s) Oral at bedtime  cyanocobalamin 1000 MICROGram(s) Oral daily  dextrose 5% + lactated ringers. 1000 milliLiter(s) IV Continuous <Continuous>  diltiazem    Tablet 30 milliGRAM(s) Oral three times a day  enoxaparin Injectable 40 milliGRAM(s) SubCutaneous every 24 hours  escitalopram 10 milliGRAM(s) Oral daily  guaiFENesin Oral Liquid (Sugar-Free) 300 milliGRAM(s) Oral every 6 hours  HYDROmorphone  Injectable 0.25 milliGRAM(s) IV Push every 8 hours PRN Severe Pain (7 - 10)  HYDROmorphone  Injectable 0.25 milliGRAM(s) IV Push once  levETIRAcetam  Solution 750 milliGRAM(s) Oral two times a day  levothyroxine 75 MICROGram(s) Oral daily  piperacillin/tazobactam IVPB.. 3.375 Gram(s) IV Intermittent every 8 hours  psyllium Powder 1 Packet(s) Oral daily  sodium chloride 3%  Inhalation 4 milliLiter(s) Inhalation every 12 hours  traMADol 50 milliGRAM(s) Oral two times a day PRN Severe Pain (7 - 10)    Labs:  CBC Full  -  ( 30 Aug 2022 12:12 )  WBC Count : 10.03 K/uL  RBC Count : 2.98 M/uL  Hemoglobin : 8.9 g/dL  Hematocrit : 29.7 %  Platelet Count - Automated : 249 K/uL  Mean Cell Volume : 99.7 fl  Mean Cell Hemoglobin : 29.9 pg  Mean Cell Hemoglobin Concentration : 30.0 gm/dL  Auto Neutrophil # : x  Auto Lymphocyte # : x  Auto Monocyte # : x  Auto Eosinophil # : x  Auto Basophil # : x  Auto Neutrophil % : x  Auto Lymphocyte % : x  Auto Monocyte % : x  Auto Eosinophil % : x  Auto Basophil % : x    08-30    139  |  100  |  31<H>  ----------------------------<  154<H>  3.4<L>   |  25  |  0.74    Ca    9.7      30 Aug 2022 12:12  Phos  3.4     08-30  Mg     2.2     08-30        Radiology:             ROS:  Patient comfortable without distress  No SOB or chest pain  No palpitation  No abdominal pain, diarrhaea or constipation  No weakness of extremities  No skin changes or swelling of legs  Rest of the comprehensive ROS was negative  Vital Signs Last 24 Hrs  T(C): 36.9 (31 Aug 2022 05:10), Max: 36.9 (30 Aug 2022 20:02)  T(F): 98.4 (31 Aug 2022 05:10), Max: 98.5 (30 Aug 2022 20:02)  HR: 92 (31 Aug 2022 05:10) (92 - 102)  BP: 151/56 (31 Aug 2022 05:10) (108/54 - 151/56)  BP(mean): --  RR: 20 (31 Aug 2022 05:10) (18 - 20)  SpO2: 100% (31 Aug 2022 05:10) (97% - 100%)    Parameters below as of 30 Aug 2022 20:02  Patient On (Oxygen Delivery Method): room air        Physical exam:  Patient alert and oriented  No distress, obese  CVS: S1, S2   Chest: bilateral breath sound without rales  Abdomen: soft, not tender, no organomegaly or masses, PEG +  CNS: old mild left sided weakness  Musculoskeletal:  Normal range of motion  Skin: No rash    Assessment and Plan:

## 2022-08-31 NOTE — PROGRESS NOTE ADULT - SUBJECTIVE AND OBJECTIVE BOX
FRANKI MEHTA 87y [id number]    Patient is a 87y old  Female who presents with a chief complaint of SOB (31 Aug 2022 10:48)      Follow Up/CC:  ID following for leukocytosis     Interval History/ROS: no fever    Allergies    Toradol (Urticaria (Mild to Mod); Rash (Mild to Mod))    Intolerances        ANTIMICROBIALS:  piperacillin/tazobactam IVPB.. 3.375 every 8 hours      MEDICATIONS  (STANDING):  acetaminophen    Suspension .. 800 milliGRAM(s) Oral every 6 hours  albuterol/ipratropium for Nebulization 3 milliLiter(s) Nebulizer every 6 hours  allopurinol 100 milliGRAM(s) Oral daily  atorvastatin 20 milliGRAM(s) Oral at bedtime  cyanocobalamin 1000 MICROGram(s) Oral daily  dextrose 5% + lactated ringers. 1000 milliLiter(s) (80 mL/Hr) IV Continuous <Continuous>  diltiazem    Tablet 30 milliGRAM(s) Oral three times a day  enoxaparin Injectable 40 milliGRAM(s) SubCutaneous every 24 hours  escitalopram 10 milliGRAM(s) Oral daily  guaiFENesin Oral Liquid (Sugar-Free) 300 milliGRAM(s) Oral every 6 hours  HYDROmorphone  Injectable 0.25 milliGRAM(s) IV Push once  levETIRAcetam  Solution 750 milliGRAM(s) Oral two times a day  levothyroxine 75 MICROGram(s) Oral daily  piperacillin/tazobactam IVPB.. 3.375 Gram(s) IV Intermittent every 8 hours  psyllium Powder 1 Packet(s) Oral daily  sodium chloride 3%  Inhalation 4 milliLiter(s) Inhalation every 12 hours    MEDICATIONS  (PRN):  HYDROmorphone  Injectable 0.25 milliGRAM(s) IV Push every 8 hours PRN Severe Pain (7 - 10)  traMADol 50 milliGRAM(s) Oral two times a day PRN Severe Pain (7 - 10)        Vital Signs Last 24 Hrs  T(C): 36.9 (31 Aug 2022 05:10), Max: 36.9 (30 Aug 2022 20:02)  T(F): 98.4 (31 Aug 2022 05:10), Max: 98.5 (30 Aug 2022 20:02)  HR: 92 (31 Aug 2022 05:10) (92 - 102)  BP: 151/56 (31 Aug 2022 05:10) (110/56 - 151/56)  BP(mean): --  RR: 20 (31 Aug 2022 05:10) (18 - 20)  SpO2: 100% (31 Aug 2022 05:10) (97% - 100%)    Parameters below as of 30 Aug 2022 20:02  Patient On (Oxygen Delivery Method): room air        CBC Full  -  ( 30 Aug 2022 12:12 )  WBC Count : 10.03 K/uL  RBC Count : 2.98 M/uL  Hemoglobin : 8.9 g/dL  Hematocrit : 29.7 %  Platelet Count - Automated : 249 K/uL  Mean Cell Volume : 99.7 fl  Mean Cell Hemoglobin : 29.9 pg  Mean Cell Hemoglobin Concentration : 30.0 gm/dL  Auto Neutrophil # : x  Auto Lymphocyte # : x  Auto Monocyte # : x  Auto Eosinophil # : x  Auto Basophil # : x  Auto Neutrophil % : x  Auto Lymphocyte % : x  Auto Monocyte % : x  Auto Eosinophil % : x  Auto Basophil % : x    08-30    139  |  100  |  31<H>  ----------------------------<  154<H>  3.4<L>   |  25  |  0.74    Ca    9.7      30 Aug 2022 12:12  Phos  3.4     08-30  Mg     2.2     08-30            MICROBIOLOGY:  Clean Catch Clean Catch (Midstream)  08-27-22   >100,000 CFU/ml Gram positive organisms  <10,000 CFU/ml Normal Urogenital ck present  --  --      .Blood Blood-Peripheral  08-27-22   No growth to date.  --  --      .Blood Blood-Peripheral  08-27-22   No growth to date.  --  --              v    Rapid RVP Result: NotDete (08-27 @ 21:05)          RADIOLOGY

## 2022-08-31 NOTE — DISCHARGE NOTE PROVIDER - NSDCCPCAREPLAN_GEN_ALL_CORE_FT
PRINCIPAL DISCHARGE DIAGNOSIS  Diagnosis: Tracheobronchomalacia  Assessment and Plan of Treatment: Your dyspnea was likely secondary to your severe tracheobronchomalacia. You were treated symptomatically with supplemental oxygen and duonebs. You were found not to need any supplemental oxygen. Please continue to follow up with your pulmonologist Dr. Marcano for further care.      SECONDARY DISCHARGE DIAGNOSES  Diagnosis: Breast cancer  Assessment and Plan of Treatment: CT scans done during this admission shows likely further spread of your breat cancer metastasis. Cancer can only be definitively diagnoses with a biopsy, an invasive procedure. Please continue to follow up with your oncologist for further care.

## 2022-08-31 NOTE — DISCHARGE NOTE PROVIDER - NSDCMRMEDTOKEN_GEN_ALL_CORE_FT
acetaminophen 160 mg/5 mL oral suspension: 20.31 milliliter(s) orally every 6 hours, As needed, Mild Pain (1 - 3)  allopurinol 100 mg oral tablet: 1 tab(s) orally once a day  atorvastatin 20 mg oral tablet: 1 tab(s) orally once a day (at bedtime)  baclofen 10 mg oral tablet: 1 tab(s) orally 2 times a day  dilTIAZem 30 mg oral tablet: 1 tab(s) orally 3 times a day  escitalopram 10 mg oral tablet: 1 tab(s) orally once a day  ipratropium-albuterol 0.5 mg-2.5 mg/3 mL inhalation solution: 3 milliliter(s) inhaled every 6 hours  levETIRAcetam 100 mg/mL oral solution: 7.5 milliliter(s) orally 2 times a day  levothyroxine 75 mcg (0.075 mg) oral tablet: 1 tab(s) orally once a day  Multi-Delyn oral liquid:   sodium chloride 3% inhalation solution: 4 milliliter(s) inhaled every 12 hours  traMADol 50 mg oral tablet: 1 tab(s) orally 2 times a day, As Needed  Vitamin B12 1000 mcg oral tablet: 1 tab(s) orally once a day   allopurinol 100 mg oral tablet: 1 tab(s) orally once a day  atorvastatin 20 mg oral tablet: 1 tab(s) orally once a day (at bedtime)  baclofen 10 mg oral tablet: 1 tab(s) orally 2 times a day  dilTIAZem 30 mg oral tablet: 1 tab(s) orally 3 times a day  escitalopram 10 mg oral tablet: 1 tab(s) orally once a day  ipratropium-albuterol 0.5 mg-2.5 mg/3 mL inhalation solution: 3 milliliter(s) inhaled every 6 hours  levETIRAcetam 100 mg/mL oral solution: 7.5 milliliter(s) orally 2 times a day  levothyroxine 75 mcg (0.075 mg) oral tablet: 1 tab(s) orally once a day  Multi-Delyn oral liquid:   sodium chloride 3% inhalation solution: 4 milliliter(s) inhaled every 12 hours  traMADol 50 mg oral tablet: 1 tab(s) orally 2 times a day, As Needed  Vitamin B12 1000 mcg oral tablet: 1 tab(s) orally once a day   allopurinol 100 mg oral tablet: 1 tab(s) orally once a day  atorvastatin 20 mg oral tablet: 1 tab(s) orally once a day (at bedtime)  baclofen 10 mg oral tablet: 1 tab(s) orally 2 times a day  dilTIAZem 30 mg oral tablet: 1 tab(s) orally 3 times a day  escitalopram 10 mg oral tablet: 1 tab(s) orally once a day  ipratropium-albuterol 0.5 mg-2.5 mg/3 mL inhalation solution: 3 milliliter(s) inhaled every 6 hours  levETIRAcetam 100 mg/mL oral solution: 7.5 milliliter(s) orally 2 times a day  levothyroxine 75 mcg (0.075 mg) oral tablet: 1 tab(s) orally once a day  melatonin 3 mg oral tablet: 1 tab(s) orally once a day (at bedtime)  Multi-Delyn oral liquid:   sodium chloride 3% inhalation solution: 4 milliliter(s) inhaled every 12 hours  traMADol 50 mg oral tablet: 1 tab(s) orally 2 times a day, As Needed  Vitamin B12 1000 mcg oral tablet: 1 tab(s) orally once a day   allopurinol 100 mg oral tablet: 1 tab(s) orally once a day  anastrozole 1 mg oral tablet: 1 tab(s) orally once a day  atorvastatin 20 mg oral tablet: 1 tab(s) orally once a day (at bedtime)  baclofen 10 mg oral tablet: 1 tab(s) orally 2 times a day  dilTIAZem 30 mg oral tablet: 1 tab(s) orally 3 times a day  escitalopram 10 mg oral tablet: 1 tab(s) orally once a day  ipratropium-albuterol 0.5 mg-2.5 mg/3 mL inhalation solution: 3 milliliter(s) inhaled every 6 hours  levETIRAcetam 100 mg/mL oral solution: 7.5 milliliter(s) orally 2 times a day  levothyroxine 75 mcg (0.075 mg) oral tablet: 1 tab(s) orally once a day  melatonin 3 mg oral tablet: 1 tab(s) orally once a day (at bedtime)  Multi-Delyn oral liquid:   sodium chloride 3% inhalation solution: 4 milliliter(s) inhaled every 12 hours  traMADol 50 mg oral tablet: 1 tab(s) orally 2 times a day, As Needed  Vitamin B12 1000 mcg oral tablet: 1 tab(s) orally once a day   allopurinol 100 mg oral tablet: 1 tab(s) by gastrostomy tube once a day  anastrozole 1 mg oral tablet: 1 tab(s) by gastrostomy tube once a day  atorvastatin 20 mg oral tablet: 1 tab(s) by gastrostomy tube once a day (at bedtime)  baclofen 10 mg oral tablet: 1 tab(s) by gastrostomy tube 2 times a day  dilTIAZem 30 mg oral tablet: 1 tab(s) by gastrostomy tube 3 times a day  escitalopram 10 mg oral tablet: 1 tab(s) by gastrostomy tube once a day  ipratropium-albuterol 0.5 mg-2.5 mg/3 mL inhalation solution: 3 milliliter(s) inhaled every 6 hours  levETIRAcetam 100 mg/mL oral solution: 7.5 milliliter(s) by gastrostomy tube 2 times a day  levothyroxine 75 mcg (0.075 mg) oral tablet: 1 tab(s) by gastrostomy tube once a day  melatonin 3 mg oral tablet: 1 tab(s) by gastrostomy tube once a day (at bedtime)  sodium chloride 3% inhalation solution: 4 milliliter(s) inhaled every 12 hours  traMADol 50 mg oral tablet: 1 tab(s) by gastrostomy tube 2 times a day, As Needed  Vitamin B12 1000 mcg oral tablet: 1 tab(s) by gastrostomy tube once a day

## 2022-08-31 NOTE — PROGRESS NOTE ADULT - ASSESSMENT
87yo F w/ PMH of severe tracheobronchomalacia, HTN, HLD, CAD, CVA w/ residual L sided weakness, Intrinsic lung disease, Aortic stenosis, R breast CA s/p mastectomy, L lung collapse and MRSE p/w dyspnea from rehab with leukocytosis, positive urine cx    John Fragoso  Attending Physician   Division of Infectious Disease  Office #[phone]  Available on [employer] Teams also  After 5pm/weekend or no response, call #[phone]

## 2022-08-31 NOTE — DISCHARGE NOTE PROVIDER - CARE PROVIDER_API CALL
Kennedy Marcano)  Internal Medicine; Pulmonary Disease  [address] [facility] [facility] [address], [address]  [place] [address]  Phone: [phone]  Fax: [phone]  Established Patient  Follow Up Time: 1 month    Heide Ayon  CARDIOVASCULAR DISEASE  [address] [place] [address], [address]  [place] [address]  Phone: [phone]  Fax: [phone]  Established Patient  Follow Up Time: Routine    Ohri, Daksha  HEMATOLOGY  [address] [facility], [address]  [place] [address]  Phone: [phone]  Fax: [phone]  Established Patient  Follow Up Time: 1 month

## 2022-08-31 NOTE — PROGRESS NOTE ADULT - ASSESSMENT
Pt is an 87yo F w/ PMH of severe tracheobronchomalacia, HTN, HLD, CAD, CVA w/ residual L sided weakness, Intrinsic lung disease, Aortic stenosis, R breast CA s/p mastectomy, L lung collapse and MRSE p/w dyspnea from rehab.   She has a previous hx of resp failure d/t mucus plugging w/ complete L lung collapse and severe tracheobronchomalacia requiring hospitalization at [facility] in March 2022. After discharge to rehab pt was maintained on BiPAP for about one month's time during the evening hours but was subsequently taken off. She was reportedly doing well until until started complaining of an inability to breath and having increased WOB prompting her ED visit 8/28/22.   In the ED she was noted to be tachycardic and tachypneic w/ labs significant for a leukocytosis and positive UA. CXR w/ LLL opacity vs atelectasis. She was administered Vanc, Zosyn, Tylenol and Duoneb treatments     She had right simple mastectomy in 4/2019 [id number] breast cancer, ER, AK positive and Her-2 negative.   She would ideally have received adjuvant hormonal therapy.    Any Rx after that is unknown. CT (12.30.21 @ 14:12) >  BONES: Degenerative changes. T11 compression deformity, unchanged.   Redemonstration of an indeterminate lytic lesion in the right acetabulum.  Patient was in hospital at that time with perforated appendix with collection, managed conservatively.  Further evaluation was hampered by her comorbidities though thought was to get bone scan eventually as outpatient.  Now appears to have lytic lesions in T spine  Will send paraproteinemia eval.  Dx can only be made with bone bx when stable and if desired  Ideally MRI of T spine is indicated.  I had a long discussion with patient's son Sy.  He was told about new findings on scars.  Ideally need of MRI T spine and bx from bone for dx.  He understood the limitations of doing these studies with his mother's respiratory status and co morbidities. He also understands the limitations of giving any treatment even if dx is made  However if possible at some time, he would like to know dx.    He finally said, he will talk to his brother.  For now concentrate on getting her respiratory status better.   If down the line if her condition improves in couple of months reconsider getting dx with biopsy as he would like to know that.  MRI spine if can be done for now    Pt is an 85yo F w/ PMH of severe tracheobronchomalacia, HTN, HLD, CAD, CVA w/ residual L sided weakness, Intrinsic lung disease, Aortic stenosis, R breast CA s/p mastectomy, L lung collapse and MRSE p/w dyspnea from rehab.   She has a previous hx of resp failure d/t mucus plugging w/ complete L lung collapse and severe tracheobronchomalacia requiring hospitalization at [facility] in March 2022. After discharge to rehab pt was maintained on BiPAP for about one month's time during the evening hours but was subsequently taken off. She was reportedly doing well until until started complaining of an inability to breath and having increased WOB prompting her ED visit 8/28/22.   In the ED she was noted to be tachycardic and tachypneic w/ labs significant for a leukocytosis and positive UA. CXR w/ LLL opacity vs atelectasis. She was administered Vanc, Zosyn, Tylenol and Duoneb treatments     She had right simple mastectomy in 4/2019 [id number] breast cancer, ER, CA positive and Her-2 negative.   She would ideally have received adjuvant hormonal therapy.    Any Rx after that is unknown. CT (12.30.21 @ 14:12) >  BONES: Degenerative changes. T11 compression deformity, unchanged.   Redemonstration of an indeterminate lytic lesion in the right acetabulum.  Patient was in hospital at that time with perforated appendix with collection, managed conservatively.  Further evaluation was hampered by her comorbidities though thought was to get bone scan eventually as outpatient.  Now appears to have lytic lesions in T spine  Paraproteinemia eval. sent  MRI spine and CT abdomen is on order  Dx can only be made with bone bx when stable and if desired  I had a long discussion with patient's son Sy 8/30/22. .   He was told about new findings on scars.  He understood the limitations of management for his mother because of respiratory status and co morbidities. He also understands the limitations of giving any treatment even if dx is made  However if possible at some time, he would like to know dx.  He finally said, he will talk to his brother.  For now concentrate on getting her respiratory status better.   If down the line if her condition improves in couple of months reconsider getting dx with biopsy as he would like to know that.

## 2022-08-31 NOTE — PROGRESS NOTE ADULT - SUBJECTIVE AND OBJECTIVE BOX
CARDIOLOGY     PROGRESS  NOTE   ________________________________________________    CHIEF COMPLAINT:Patient is a 87y old  Female who presents with a chief complaint of SOB (31 Aug 2022 07:45)  no complain.  	  REVIEW OF SYSTEMS:  CONSTITUTIONAL: No fever, weight loss, or fatigue  EYES: No eye pain, visual disturbances, or discharge  ENT:  No difficulty hearing, tinnitus, vertigo; No sinus or throat pain  NECK: No pain or stiffness  RESPIRATORY: No cough, wheezing, chills or hemoptysis; No Shortness of Breath  CARDIOVASCULAR: No chest pain, palpitations, passing out, dizziness, or leg swelling  GASTROINTESTINAL: No abdominal or epigastric pain. No nausea, vomiting, or hematemesis; No diarrhea or constipation. No melena or hematochezia.  GENITOURINARY: No dysuria, frequency, hematuria, or incontinence  NEUROLOGICAL: No headaches, memory loss, loss of strength, numbness, or tremors  SKIN: No itching, burning, rashes, or lesions   LYMPH Nodes: No enlarged glands  ENDOCRINE: No heat or cold intolerance; No hair loss  MUSCULOSKELETAL: No joint pain or swelling; No muscle, back, or extremity pain  PSYCHIATRIC: No depression, anxiety, mood swings, or difficulty sleeping  HEME/LYMPH: No easy bruising, or bleeding gums  ALLERGY AND IMMUNOLOGIC: No hives or eczema	    [ ] All others negative	  [ ] Unable to obtain    PHYSICAL EXAM:  T(C): 36.9 (08-31-22 @ 05:10), Max: 36.9 (08-30-22 @ 20:02)  HR: 92 (08-31-22 @ 05:10) (92 - 102)  BP: 151/56 (08-31-22 @ 05:10) (108/54 - 151/56)  RR: 20 (08-31-22 @ 05:10) (18 - 20)  SpO2: 100% (08-31-22 @ 05:10) (97% - 100%)  Wt(kg): --  I&O's Summary    30 Aug 2022 07:01  -  31 Aug 2022 07:00  --------------------------------------------------------  IN: 2500 mL / OUT: 550 mL / NET: 1950 mL        Appearance: Normal	  HEENT:   Normal oral mucosa, PERRL, EOMI	  Lymphatic: No lymphadenopathy  Cardiovascular: Normal S1 S2, No JVD, + murmurs, No edema  Respiratory:rhonchi  Psychiatry: A & O x 3, Mood & affect appropriate  Gastrointestinal:  Soft, Non-tender, + BS	  Skin: No rashes, No ecchymoses, No cyanosis	  Neurologic: Non-focal  Extremities: Normal range of motion, No clubbing, cyanosis or edema  Vascular: Peripheral pulses palpable 2+ bilaterally    MEDICATIONS  (STANDING):  acetaminophen    Suspension .. 800 milliGRAM(s) Oral every 6 hours  albuterol/ipratropium for Nebulization 3 milliLiter(s) Nebulizer every 6 hours  allopurinol 100 milliGRAM(s) Oral daily  atorvastatin 20 milliGRAM(s) Oral at bedtime  cyanocobalamin 1000 MICROGram(s) Oral daily  dextrose 5% + lactated ringers. 1000 milliLiter(s) (80 mL/Hr) IV Continuous <Continuous>  diltiazem    Tablet 30 milliGRAM(s) Oral three times a day  enoxaparin Injectable 40 milliGRAM(s) SubCutaneous every 24 hours  escitalopram 10 milliGRAM(s) Oral daily  guaiFENesin Oral Liquid (Sugar-Free) 300 milliGRAM(s) Oral every 6 hours  HYDROmorphone  Injectable 0.25 milliGRAM(s) IV Push once  levETIRAcetam  Solution 750 milliGRAM(s) Oral two times a day  levothyroxine 75 MICROGram(s) Oral daily  piperacillin/tazobactam IVPB.. 3.375 Gram(s) IV Intermittent every 8 hours  psyllium Powder 1 Packet(s) Oral daily  sodium chloride 3%  Inhalation 4 milliLiter(s) Inhalation every 12 hours      TELEMETRY: 	    ECG:  	  RADIOLOGY:  OTHER: 	  	  LABS:	 	    CARDIAC MARKERS:                                8.9    10.03 )-----------( 249      ( 30 Aug 2022 12:12 )             29.7     08-30    139  |  100  |  31<H>  ----------------------------<  154<H>  3.4<L>   |  25  |  0.74    Ca    9.7      30 Aug 2022 12:12  Phos  3.4     08-30  Mg     2.2     08-30      proBNP: Serum Pro-Brain Natriuretic Peptide: 155 pg/mL (08-27 @ 21:06)    Lipid Profile:   HgA1c:   TSH:   PT/INR - ( 30 Aug 2022 12:12 )   PT: 13.8 sec;   INR: 1.20 ratio         PTT - ( 30 Aug 2022 12:12 )  PTT:32.1 sec  < from: CT Chest No Cont (08.29.22 @ 09:06) >  Bilateral dependent atelectasis. Interval resolution of left lower lobe   complete collapse.    Multiple lytic bone lesions, predominantly in the thoracic vertebral   bodies which is concerning for metastatic disease. Unclear if there is   any spinal canal extension. Recommend MRI of the thoracic spine for   further evaluation.      Assessment and plan  ---------------------------  Pt is an 85yo F w/ PMH of severe tracheobronchomalacia, HTN, HLD, CAD, CVA w/ residual L sided weakness, Intrinsic lung disease, Aortic stenosis, R breast CA s/p mastectomy, L lung collapse and MRSE p/w dyspnea from rehab.   Unable to obtain further history from patient d/t dyspnea and use of CPAP. Remained of history provided by chart review and collateral from pt's son.  She has a previous hx of resp failure d/t mucus plugging w/ complete L lung collapse and severe tracheobronchomalacia requiring hospitalization at [facility] in March 2022. After discharge to rehab pt was maintained on BiPAP for about one month's time during the evening hours but was subsequently taken off. She was reportedly doing well until until started complaining of an inability to breath and having increased WOB prompting her ED visit.   In the ED she was noted to be tachycardic and tachypneic w/ labs significant for a leukocytosis and positive UA. CXR w/ LLL opacity vs atelectasis. She was administered Vanc, Zosyn, Tylenol and Duoneb treatments and cultures were sent to r/o infectious etiology.  pt is well known to me with hx of htn, tracheomalacia with multiple admission with l lung collapse, htn, cad, chf diastolic dysfunction with perforated appendicitis.  sob ?to lung collapse increase o2 / neb  ct chest no contrast  continue po Cardizem  MODERATE AS, continue to follow, no need to repeat echo  pt with hx of ASHD/ MI, ?asa daily, pt with hx of GI bleed on the last admission  dvt prophylaxis  continue Bipap at night  borderline low bp if symptomatic will consider adding midodrine  check le venous doppler  ct chest noted, r/o metastatic disease/ onc noted

## 2022-08-31 NOTE — DISCHARGE NOTE PROVIDER - PROVIDER TOKENS
PROVIDER:[TOKEN:[[id number]],FOLLOWUP:[1 month],ESTABLISHEDPATIENT:[T]],PROVIDER:[TOKEN:[[id number]],FOLLOWUP:[Routine],ESTABLISHEDPATIENT:[T]],PROVIDER:[TOKEN:[[id number]],FOLLOWUP:[1 month],ESTABLISHEDPATIENT:[T]]

## 2022-08-31 NOTE — DISCHARGE NOTE PROVIDER - NSDCCPTREATMENT_GEN_ALL_CORE_FT
PRINCIPAL PROCEDURE  Procedure: CT scan  Findings and Treatment: Multiple lytic lesions are seen throughout the axial and appendicular   skeleton, some of which appear increased in size from CT abdomen pelvis   3/16/2022 when evaluated in retrospect. A few lytic lesions involve the   left intertrochanteric region and right acetabulum.  Endometrial thickening. Recommend dedicated pelvic sonogram.  Cystic lesions are seen within the pancreas. Recommend a dedicated MRI on a nonemergent basis.

## 2022-08-31 NOTE — PROGRESS NOTE ADULT - SUBJECTIVE AND OBJECTIVE BOX
[facility] - PROGRESS NOTE    CHIEF COMPLAINT: chronic hypoxic respiratory failure; acute hypercapnic respiratory failure; mucous plugging; atelectasis; weak cough; dysphagia; tracheobronchomalacia; h/o 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;     INTERVAL HISTORY: started on a puree diet with moderately thickened fluids; PEG tube replaced; pain is well controlled; no shortness of breath or hypoxemia on room air; no cough, sputum production, hemoptysis, chest congestion or wheeze; no fevers, chills or sweats; no chest pain/pressure or palpitations;    REVIEW OF SYSTEMS:  Constitutional: As per interval history  HEENT: Within normal limits  CV: As per interval history  Resp: As per interval history  GI: dysphagia  : Within normal limits  Musculoskeletal: Within normal limits  Skin: Within normal limits  Neurological: CVA - left sided plegia  Psychiatric: Within normal limits  Endocrine: Within normal limits  Hematologic/Lymphatic: Within normal limits  Allergic/Immunologic: Within normal limits    MEDICATIONS:     Pulmonary "  albuterol/ipratropium for Nebulization 3 milliLiter(s) Nebulizer every 6 hours  guaiFENesin Oral Liquid (Sugar-Free) 300 milliGRAM(s) Oral every 6 hours  sodium chloride 3%  Inhalation 4 milliLiter(s) Inhalation every 12 hours    Anti-microbials:    Cardiovascular:  diltiazem    Tablet 30 milliGRAM(s) Oral three times a day    Other:  acetaminophen    Suspension .. 800 milliGRAM(s) Oral every 6 hours  allopurinol 100 milliGRAM(s) Oral daily  atorvastatin 20 milliGRAM(s) Oral at bedtime  cyanocobalamin 1000 MICROGram(s) Oral daily  dextrose 5% + lactated ringers. 1000 milliLiter(s) IV Continuous <Continuous>  enoxaparin Injectable 40 milliGRAM(s) SubCutaneous every 24 hours  escitalopram 10 milliGRAM(s) Oral daily  HYDROmorphone  Injectable 0.25 milliGRAM(s) IV Push once  levETIRAcetam  Solution 750 milliGRAM(s) Oral two times a day  levothyroxine 75 MICROGram(s) Oral daily  psyllium Powder 1 Packet(s) Oral daily    MEDICATIONS  (PRN):  HYDROmorphone  Injectable 0.25 milliGRAM(s) IV Push every 8 hours PRN Severe Pain (7 - 10)  traMADol 50 milliGRAM(s) Oral two times a day PRN Severe Pain (7 - 10)    OBJECTIVE:    PHYSICAL EXAM:       ICU Vital Signs Last 24 Hrs  T(C): 36.6 (31 Aug 2022 13:28), Max: 37 (31 Aug 2022 09:15)  T(F): 97.9 (31 Aug 2022 13:28), Max: 98.6 (31 Aug 2022 09:15)  HR: 74 (31 Aug 2022 13:28) (74 - 97)  BP: 96/79 (31 Aug 2022 13:28) (96/79 - 151/56)  BP(mean): --  ABP: --  ABP(mean): --  RR: 20 (31 Aug 2022 13:28) (18 - 20)  SpO2: 95% (31 Aug 2022 13:28) (95% - 100%) on room      General: Awake and alert. Cooperative. No distress Appears stated age. Bedbound  HEENT: Atraumatic. Normocephalic. Anicteric. Normal oral mucosa. PERRL. EOMI. Mallampati class IV airway.  Neck: Supple. Trachea midline. Thyroid without enlargement/tenderness/nodules. No carotid bruit. No JVD. Short and wide  Cardiovascular: Regular rate and rhythm. S1 S2 normal. III/VI systolic murmur  Respiratory: Respirations unlabored. Left basilar rales. No wheeze. Kyphosis. Poor chest wall excursion  Abdomen: Soft. Non-tender. Non-distended. No organomegaly. No masses. Normal bowel sounds. Obese. Basurto cathter placed in PEG entrance site  Extremities: Warm to touch. No clubbing or cyanosis. No pedal edema. Large legs. Left leg contracted under the right leg  Pulses: 2+ peripheral pulses all extremities.	  Skin: Normal skin color. No rashes or lesions. No ecchymoses. No cyanosis. Warm to touch.  Lymph Nodes: Cervical, supraclavicular and axillary nodes normal  Neurological: Left lower extremity plegia with contraction. Left upper extremity is quite weak. A and O x 3  Psychiatry: Appropriate mood and affect.    LABS:                          8.9    10.03 )-----------( 249      ( 30 Aug 2022 12:12 )             29.7     CBC    WBC  10.03 <==, 13.93 <==, 14.46 <==    Hemoglobin  8.9 <<==, 9.6 <<==, 10.1 <<==    Hematocrit  29.7 <==, 30.6 <==, 31.8 <==    Platelets  249 <==, 296 <==, 298 <==      139  |  100  |  31<H>  ----------------------------<  154<H>    08-30  3.4<L>   |  25  |  0.74      LYTES    sodium  139 <==, 132 <==, 130 <==, 132 <==, 131 <==    potassium   3.4 <==, 4.9 <==, 5.7 <==, 5.3 <==, 5.8 <==    chloride  100 <==, 94 <==, 93 <==, 94 <==, 94 <==    carbon dioxide  25 <==, 22 <==, 20 <==, 21 <==, 22 <==    =============================================================================================  RENAL FUNCTION:    Creatinine:   0.74  <<==, 0.72  <<==, 0.69  <<==, 0.78  <<==, 0.69  <<==    BUN:   31 <==, 44 <==, 44 <==, 47 <==, 44 <==    ============================================================================================    calcium   9.7 <==, 9.7 <==, 9.8 <==, 9.9 <==, 9.8 <==    phos   3.4 <==, 4.7 <==    mag   2.2 <==, 2.3 <==    ============================================================================================  LFTs    AST:   37 <== , 29 <==     ALT:  16  <== , 16  <==     AP:  137  <=, 137  <=    Bili:  0.2  <=, 0.2  <=    Venous Blood Gas:   @ 22:32  7.36/47/48/27/79.0  VBG Lactate: --    Venous Blood Gas:   @ 20:50  7.31/48/57/24/86.9  VBG Lactate: 2.5    PT/INR - ( 30 Aug 2022 12:12 )   PT: 13.8 sec;   INR: 1.20 ratio       PTT - ( 30 Aug 2022 12:12 )  PTT:32.1 sec    Serum Pro-Brain Natriuretic Peptide: 155 pg/mL ( @ 21:06)    CARDIAC MARKERS ( 27 Aug 2022 21:06 )  CPK x     /CKMB x     /CKMB Units x        troponin 21 ng/L    < from: TTE with Doppler (w/Cont) (22 @ 14:08) >    Patient name: FRANKI MEHTA  YOB: 1935   Age: 86 (F)   MR#: [id number]  Study Date: 2022  ------------------------------------------------------------------------  Dimensions:    Normal Values:  LA:     2.5    2.0 - 4.0 cm  Ao:     3.3    2.0 - 3.8 cm  SEPTUM: 1.6    0.6 - 1.2 cm  PWT:    0.9    0.6 - 1.1 cm  LVIDd:  3.6    3.0 - 5.6 cm  LVIDs:  2.5    1.8 - 4.0 cm  Derived variables:  LVMI: 77 g/m2  RWT: 0.50  Fractional short: 31 %  EF (Visual Estimate): 70-75 %  ------------------------------------------------------------------------  Conclusions:  1. Concentric left ventricular hypertrophy.  2. Hyperdynamic left ventricular systolic function.  Endocardial visualization enhanced with intravenous  injection of Ultrasonic Enhancing Agent (Definity).  3. The right ventricle is not well visualized; grossly  normal right ventricular systolic function.  Pt refused to proceed with exam.  Limited Doppler study.  No trans aortic gradients.  Unable to rule out endocarditis.  ------------------------------------------------------------------------  Confirmed on 2022 - 15:42:57 by COMPA Castillo  ------------------------------------------------------------------------  ---------------------------------------------------------------------------------------------------------------  MICROBIOLOGY:     Respiratory Viral Panel with COVID-19 by RYAN (22 @ 21:05)   Rapid RVP Result: [facility]   SARS-CoV-2: [facility]  This Respiratory Panel uses polymerase chain reaction (PCR) to detect for   adenovirus; coronavirus (HKU1, NL63, 229E, OC43); human metapneumovirus   (hMPV); human enterovirus/rhinovirus (Entero/RV); influenza A; influenza   A/H1; influenza A/H3; influenza A/H1-2009; influenza B; parainfluenza   viruses 1, 2, 3, 4; respiratory syncytial virus; Mycoplasma pneumoniae;   Chlamydophila pneumoniae; and SARS-CoV-2.     Urinalysis Basic - ( 27 Aug 2022 22:42 )    Color: Light Orange / Appearance: Turbid / S.013 / pH: x  Gluc: x / Ketone: Negative  / Bili: Negative / Urobili: Negative   Blood: x / Protein: 30 mg/dL / Nitrite: Negative   Leuk Esterase: Large / RBC: 8 /hpf / WBC 1145 /HPF   Sq Epi: x / Non Sq Epi: 1 /hpf / Bacteria: Moderate    Culture - Urine (22 @ 22:42)   Specimen Source: Clean Catch Clean Catch (Midstream)   Culture Results:   >100,000 CFU/ml Gram positive organisms   <10,000 CFU/ml Normal Urogenital ck present     Culture - Blood (22 @ 20:30)   Specimen Source: .Blood Blood-Peripheral   Culture Results:   No growth to date.     Culture - Blood (22 @ 20:15)   Specimen Source: .Blood Blood-Peripheral   Culture Results:   No growth to date.     MRSA/MSSA PCR (22 @ 05:23)   MRSA PCR Result.: [facility]  Staph aureus PCR Result: [facility]     RADIOLOGY:  [x ] Chest radiographs reviewed and interpreted by me    EXAM:  XR CHEST PORTABLE URGENT 1V                          PROCEDURE DATE:  2022      FINDINGS:    The heart size is not well evaluated in this projection.  Low lung volumes. Patchy left lower lung opacity is again seen, likely   atelectasis.  Possible small left pleural effusion. No pneumothorax.  No acute bony abnormalities. Partially visualized left humeral hardware.    IMPRESSION:    Patchy left lower lung opacity, likely atelectasis. Possible small left   pleural effusion.    MARKO GUAMAN DO; Resident Radiologist  This document has been electronically signed.  PRAVIN CASTILLO MD; Attending Radiologist  This document has been electronically signed. Aug 28 2022 10:09AM  ---------------------------------------------------------------------------------------------------------------  EXAM:  CT CHEST                          PROCEDURE DATE:  2022        FINDINGS:    LYMPH NODES: No mediastinal lymphadenopathy.    HEART/VASCULATURE: The heart is normal in size. No pericardial effusion.   Aortic valve calcifications noted.    AIRWAYS/LUNGS/PLEURA: Patent central airways. Right lower lobe dependent   atelectasis appear similar to prior. Patchy groundglass opacities the   right apex are improved. Interval resolution of left lower lobe complete   collapse. There is mild dependent atelectasis of the left upper and lower   lobes.    UPPER ABDOMEN: Gallbladder stones.    BONES/SOFT TISSUES: Status post right mastectomy. Status post left   humerus ORIF. Marked kyphosis. Multiple lytic bone lesions predominantly   in the posterior and lateral thoracic vertebral bodies. It is unclear if   there is any spinal canal extension. Lytic lesions are alsoseen at the   right posterior seventh rib and right humeral head    OTHER: Right maxillary sinus is opacified.      IMPRESSION:  Bilateral dependent atelectasis. Interval resolution of left lower lobe   complete collapse.    Multiple lytic bone lesions, predominantly in the thoracic vertebral   bodies which is concerning for metastatic disease. Unclear if there is   any spinal canal extension. Recommend MRI of the thoracic spine for   further evaluation.    BAILEY STARR MD; Resident Radiologist  This document has been electronically signed.  HAY IRVIN MD; Attending Radiologist  This document has been electronically signed. Aug 29 2022 12:01PM  ---------------------------------------------------------------------------------------------------------------  EXAM:  XR HUMERUS MIN 2 VIEWS RT                          PROCEDURE DATE:  2022      EXAM:  Internal/external rotation AP right humerus from 2022 at 0927.   Compared to appearance on previous day chest CT.    IMPRESSION:  Generalized osteopenia. Redemonstrated focal lytic lesion in proximal   medial humeral diaphysis with small area of permeative cortical   destruction. No additional radiographically appreciated suspicious lytic   or blastic lesions in remaining imaged regions.    No current fractures or dislocations.    Preserved shoulder and elbow joint spaces.    IV cannula with attached tubing overlies upper arm.    MINE NICE MD; Attending Radiologist  This document has been electronically signed. Aug 30 2022 10:39AM  ---------------------------------------------------------------------------------------------------------------

## 2022-08-31 NOTE — CHART NOTE - NSCHARTNOTEFT_GEN_A_CORE
Reference #: [id number]    Others' Prescriptions  Patient Name: Michaelle Souza Date: 1935  Address: [address] [place] [address] [address] [place], [place] [address]D[place]: Female  Rx Written	Rx Dispensed	Drug	Quantity	Days Supply	Prescriber Name	Prescriber Breann #	Payment Method  08/15/2022	08/15/2022	tramadol hcl 50 mg tablet	20	10	Luly Chung	[id number]	Medicare  Dispenser [facility]  07/29/2022	07/29/2022	tramadol hcl 50 mg tablet	15	5	Luly Chung	[id number]	Medicare  Dispenser [facility]  11/26/2021	11/26/2021	oxycodone hcl (ir) 5 mg tablet	9	2	Luly Chung	[id number]	Medicare  Dispenser [facility]  * - Drugs marked with an asterisk are compound drugs. If the compound drug is made up of more than one controlled substance, then each controlled substance will be a separate row in the table.

## 2022-08-31 NOTE — SWALLOW FEES ASSESSMENT ADULT - DIAGNOSTIC IMPRESSIONS
Pt presents with an oropharyngeal dysphagia. Delayed oral transport noted across consistencies. Deep laryngeal penetration with suspected silent aspiration is seen prior to swallow trigger with thin liquids via teaspoon. While airway protection initially intact for mildly thick liquids, towards the end of exam, there is premature spillage with trace, shallow laryngeal penetration during the swallow, without retrieval; suspect fatigue factor negatively impacting swallow function. Given current swallow profile and prolonged NPO status, would more conservative PO textures are indicated at this time.

## 2022-08-31 NOTE — SWALLOW FEES ASSESSMENT ADULT - CONSISTENCIES ADMINISTERED
Solid textures not administered as pt stating she is unable to chew mildly thick thin liquid ice chip/moderately thick/pureed

## 2022-08-31 NOTE — PROGRESS NOTE ADULT - PROBLEM SELECTOR PLAN 2
Pt had R breast CA s/p mastectomy 4/2019, staged [id number] ER/LA+ and HER-2 negative, without adjuvant hormonal therapy. Prior CT (2021) showed possible lytic lesion, not worked up.  CT (8/29) b/l atelectasis, multiple lytic bone lesions concerning for metastatic disease.    - pending MRI  - GoC w/ pt and children Pt had R breast CA s/p mastectomy 4/2019, staged [id number] ER/SC+ and HER-2 negative, without adjuvant hormonal therapy. Prior CT (2021) showed possible lytic lesion, not worked up.  CT (8/29) b/l atelectasis, multiple lytic bone lesions concerning for metastatic disease.    - pending MRI, CTAP  - GoC w/ pt and children

## 2022-08-31 NOTE — SWALLOW FEES ASSESSMENT ADULT - LARYNGEAL PENETRATION DURING SWALLOW - SILENT
As study continues, there is trace, shallow laryngeal penetration during straw sips with incomplete retrieval.

## 2022-08-31 NOTE — SWALLOW FEES ASSESSMENT ADULT - ASPIRATION PRECAUTIONS
Monitor for s/s aspiration/laryngeal penetration. If noted:  D/C p.o. intake, provide non-oral nutrition/hydration/meds, and contact this service @ [phone] negative

## 2022-08-31 NOTE — PROGRESS NOTE ADULT - PROBLEM SELECTOR PLAN 5
Pt has been NPO with PEG tube feeds for past year. SLP consulted for possible pleasure feeds in setting of poor prognosis    - PEG noted to be displaced? 8/29. Pending IR eval. Placed 1/22?  - All peg meds converted to IV for now  - discuss further w/ son (proxy?)  - pending FEES to eval for swallowing Pt has been NPO with PEG tube feeds for past year. SLP consulted for possible pleasure feeds in setting of poor prognosis. PEG displaced 8/29 overnight, straight catheter inserted to maintain patency. PEG reinserted by IR 8/30, okay for use.    - resume PEG feeds  - discuss further w/ son Sy (proxy?)  - pending FEES to eval for swallowing

## 2022-08-31 NOTE — SWALLOW FEES ASSESSMENT ADULT - LARYNGEAL PENETRATION BEFORE THE SWALLOW - SILENT
Moderate, deep laryngeal penetration to the vocal cords/ suspected aspiration. Material appears retrieved supraglottically during the swallow, though unable to fully visualize subglottic space due to the nature of this exam.

## 2022-08-31 NOTE — SWALLOW FEES ASSESSMENT ADULT - SLP PERTINENT HISTORY OF CURRENT PROBLEM
Pt is an 87yo F w/ PMH of severe tracheobronchomalacia, HTN, HLD, CAD, CVA w/ residual L sided weakness, Intrinsic lung disease, Aortic stenosis, R breast CA s/p mastectomy, L lung collapse and MRSE p/w dyspnea from rehab. Unable to obtain further history from patient d/t dyspnea and use of CPAP. Remained of history provided by chart review and collateral from pt's son. She has a previous hx of resp failure d/t mucus plugging w/ complete L lung collapse and severe tracheobronchomalacia requiring hospitalization at [facility] in March 2022. After discharge to rehab pt was maintained on BiPAP for about one month's time during the evening hours but was subsequently taken off. She was reportedly doing well until until started complaining of an inability to breath and having increased WOB prompting her ED visit.

## 2022-08-31 NOTE — PROGRESS NOTE ADULT - PROBLEM SELECTOR PLAN 1
Dyspnea and increased WOB c/f underlying pna vs atelectasis d/t being off positive pressure ventilation  ENT laryngoscopy (8/29) vocal cords mobile and intact, no edema, upper airway patent.  MRSA (8/28) negative. BCx (8/27) NGTD x2; UCx (8/27) Gram+ Sputum (8/27) pending collection    - continue to wean O2, tolerating room air w/ SpO2>94%  - Duonebs and hypertonic saline for airway clearance  - consider d/c Zosyn empiric for PNA now that CT returned negative  - f/u UCx, downtitrate abx based off sensitivities  - pulm following; recs appreciated Dyspnea and increased WOB c/f underlying pna vs atelectasis d/t being off positive pressure ventilation  ENT laryngoscopy (8/29) vocal cords mobile and intact, no edema, upper airway patent.  MRSA (8/28) negative. BCx (8/27) NGTD x2; UCx (8/27) Gram+    - continue to wean O2, tolerating room air w/ SpO2>94%  - Duonebs and hypertonic saline for airway clearance  - c/w Zosyn (8/28-9/1) 5 day course  - pulm following; recs appreciated

## 2022-08-31 NOTE — DISCHARGE NOTE PROVIDER - HOSPITAL COURSE
Discharge Summary     Admit Date: 08-28-22  Discharge Date:    Admission diagnoses:   Dyspnea        Hospital Course:   For full details, please see H&P, progress notes, consult notes and ancillary notes. Briefly, FRANKI MEHTA is a 87yFemale with a history of ***. The patient's hospital course will be summarized.      On day of discharge, patient is clinically stable with no new exam findings or acute symptoms compared to prior. The patient was seen by the attending physician on the date of discharge and deemed stable and acceptable for discharge. The patient's chronic medical conditions were treated accordingly per the patient's home medication regimen. The patient's medication reconciliation (with changes made to chronic medications), follow up appointments, discharge orders, instructions, and significant lab and diagnostic studies are as noted.     Discharge follow up action items:     1. Follow up with PCP in 1-2 weeks.   2. Follow up labs, path, & imaging ***  3. Medication changes ***  4. On hold medications ***    Patient's ordered code status: ***    Patient disposition: ***     Discharge Summary     Admit Date: 08-28-22  Discharge Date: 09-06-22    Admission diagnoses:   Dyspnea    Hospital Course:   For full details, please see H&P, progress notes, consult notes and ancillary notes. Briefly, FRANKI MEHTA is a 87y Female with a history of  severe tracheobronchomalacia, HTN, HLD, CAD, CVA w/ residual L sided weakness, Intrinsic lung disease, aortic stenosis, R breast CA s/p mastectomy, L lung collapse and MRSE p/w dyspnea from rehab likely i/s/o PNA vs atelectasis d/t mucus plugging all i/s/o tracheobronchomalacia. The patient's hospital course will be summarized briefly below:    Patient was received from [facility]. ENT laryngoscopy showed upper airway patent w/o edema, vocal cords mobile and intact. Patient was initially placed on BiPAP, but was found to be tolerating room air. CXR showed patchy left lower lung opacity, likely atelectasis. Patient was given 5 day course of IV Zosyn to cover for possible pneumonia. CT chest showed bilateral dependent atelectasis but noted multiple lytic bone lesions throughout axial and appendicular skeleton likely to be breast cancer metastasis. Given patient's clinical status, goals of care were discussed with patient's family in regards to pursuing further workup of cancer progression in absence of intention to treat; no final decision was made. Hospital course was complicated by accidental removal of PEG tube, which was replaced without incident. Patient was evaluated by SLP and started on pleasure oral feeds w/ moderately thick liquids in addition to her regular tube feeds.    On day of discharge, patient is clinically stable with no new exam findings or acute symptoms compared to prior. The patient was seen by the attending physician on the date of discharge and deemed stable and acceptable for discharge. The patient's chronic medical conditions were treated accordingly per the patient's home medication regimen. The patient's medication reconciliation (with changes made to chronic medications), follow up appointments, discharge orders, instructions, and significant lab and diagnostic studies are as noted.     Discharge follow up action items:     1. Follow up with PCP, pulmonology, oncology, cardiology in 1-2 weeks.   2. No labs, pathology, or imaging to follow up.  3. No medication changes.  4. Patient to receive further swallow therapy at [facility].    Patient's ordered code status: FULL    Patient disposition: Long term assisted facility at [facility]     Discharge Summary   89    year old female    h/o hemorrhagic CVA, has  PEG,  HTN, MI,   HLD, gout, right ca  breast   right Ca breast. s/p mastectomy in 2019,  s/p  sentinel node  localization.  4/4,  were  negative  peg dislodged.  IR   replacements  on 1/3/22  s/p rrt  . in past,  for hypoxia. cxr with complete  collapsed  of  left lung, needing broch,   s/ p  bronchoscopy , pt  with  tracheomalacia  and  collapsed  airways,  makes  this a  difficult  situation, as there is no good rx for this     h/o bacteremia. on   pna, perforated  appendicitis,  ?  mets  to  acetabulum, was  seen by dr pacheco   surg/ IR  and  oncology eval dr pacheco   sa w pt.  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 this will not alter rx. pt  will need   extended  course  of ab   on iv  vanco and ertapenem.  till  2/9/.22  ID dr reagan, and per  surg  and  IR  eval,  no intervention   CT  1/20/22, no PE. collection in right side   s/p  rectal bleed.        *  admitted with sob    ENT eval w/ laryngoscopy notable for vocal cords mobile and intact, no edema, upper airway patent   Duonebs and hypertonic saline for airway clearance    Zosyn empiric for pna    *   s/p cva, has  PEG,  npo  ,   on  synthroid, Keppra  ,  *  HTN, on meds  per  card  *  h/o  ca breast. /, s/p  R  mastectomy  *   obesity, bmi  was   41, now  is  30       c/c left  leg contracture ,  remains  bed  bound. functional  paraplegias  needs  assistance  for  all her  adl's   *  pt  with  h/o  tracheomalacia  and  collapsed  airways,      given pt;s  mlple co morbidities,  pt is  at risk for  re admissions     on nocturnal  bipap    difficult  situation, a s there  is no  good  rx  for  pt's  recurrent lung collapse hypoxia    h/o  of  chronic    mild  tachycardia   re  admission  likely,, given her  airways, and  propensity  for  lung collapse   pt  is  oxygen dependent /  now  on N/ C     G  tube  replacement   by IR/    Feest  by  swallow team    *   CT chest. lytic  lesions, T  spine/  ribs/ humerus/  oncoloigy  magnus pacheco.  suspect mets  from ?    ca breast/  h/o  mastectomy       called son. Sy, guicho/ oncologist  has  also  discussed  with  son,  futility of  pursuing  bx. a s pt  is  not an ideal  candidate  for  chemo    CT  A.p ,/ prelim ,  pelvic  mets  per  oncology,   suspect  metastatic ca  breast    was  awaiting   mri  spine/  pt unable  to tolerate  mri   per oncology, no  further  intervention/  and  to  start  Arimidex  per d r ohri   start   d/c  planning to  reghab      per  son, pt is  full code                        Discharge Summary   86    year old female    h/o hemorrhagic CVA, has  PEG,  HTN, MI,   HLD, gout, right ca  breast   right Ca breast. s/p mastectomy in 2019,  s/p  sentinel node  localization.  4/4,  were  negative  peg dislodged.  IR   replacements  on 1/3/22  s/p rrt  . in past,  for hypoxia. cxr with complete  collapsed  of  left lung, needing broch,   s/ p  bronchoscopy , pt  with  tracheomalacia  and  collapsed  airways,  makes  this a  difficult  situation, as there is no good rx for this     tracheomalcaia noted on broch/ no  peior history      h/o bacteremia. on   pna, perforated  appendicitis,  ?  mets  to  acetabulum, was  seen by dr pacheco   surg/ IR  and  oncology eval dr pacheco   sa w pt.  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 this will not alter rx. pt  will need   extended  course  of ab   on iv  vanco and ertapenem.  till  2/9/.22  ID dr reagan, and per  surg  and  IR  eval,  no intervention   CT  1/20/22, no PE. collection in right side   s/p  rectal bleed.        *  admitted with sob    ENT eval w/ laryngoscopy notable for vocal cords mobile and intact, no edema, upper airway patent   Duonebs and hypertonic saline for airway clearance    Zosyn empiric for pna    *   s/p cva, has  PEG,  npo  ,   on  synthroid, Keppra  ,  *  HTN, on meds  per  card  *  h/o  ca breast. /, s/p  R  mastectomy  *   obesity, bmi  was   41, now  is  30       c/c left  leg contracture ,  remains  bed  bound. functional  paraplegias  needs  assistance  for  all her  adl's   *  pt  with  h/o  tracheomalacia  and  collapsed  airways,      given pt;s  mlple co morbidities,  pt is  at risk for  re admissions     on nocturnal  bipap    difficult  situation, a s there  is no  good  rx  for  pt's  recurrent lung collapse hypoxia    h/o  of  chronic    mild  tachycardia   re  admission  likely,, given her  airways, and  propensity  for  lung collapse   pt  is  oxygen dependent /  now  on N/ C     G  tube  replacement   by IR/    Feest  by  swallow team    *   CT chest. lytic  lesions, T  spine/  ribs/ humerus/  oncoloigy  d r ohri.  suspect mets  from ?    ca breast/  h/o  mastectomy       called son. Sy, updated/ oncologist  has  also  discussed  with  son,  futility of  pursuing  bx. a s pt  is  not an ideal  candidate  for  chemo    CT  A.p ,/ prelim ,  pelvic  mets  per  oncology,   suspect  metastatic ca  breast    was  awaiting   mri  spine/  pt unable  to tolerate  mri   per oncology, no  further  intervention/  and  to  start  Arimidex  per d r ohri   start   d/c  planning to  reghab      per  son, pt is  full code

## 2022-08-31 NOTE — PROGRESS NOTE ADULT - PROBLEM SELECTOR PLAN 3
CT (8/29) b/l atelectasis, multiple lytic bone lesions concerning for metastatic disease  - f/u blood, ur, and sputum cx  - oncology cx in AM

## 2022-09-01 NOTE — PROGRESS NOTE ADULT - ASSESSMENT
t is an 87yo F w/ PMH of severe tracheobronchomalacia, HTN, HLD, CAD, CVA w/ residual L sided weakness, Intrinsic lung disease, Aortic stenosis, R breast CA s/p mastectomy, L lung collapse and MRSE p/w dyspnea from rehab.   She has a previous hx of resp failure d/t mucus plugging w/ complete L lung collapse and severe tracheobronchomalacia requiring hospitalization at [facility] in March 2022. After discharge to rehab pt was maintained on BiPAP for about one month's time during the evening hours but was subsequently taken off. She was reportedly doing well until until started complaining of an inability to breath and having increased WOB prompting her ED visit 8/28/22.   In the ED she was noted to be tachycardic and tachypneic w/ labs significant for a leukocytosis and positive UA. CXR w/ LLL opacity vs atelectasis. She was administered Vanc, Zosyn, Tylenol and Duoneb treatments     She had right simple mastectomy in 4/2019 [id number] breast cancer, ER, DC positive and Her-2 negative.   She would ideally have received adjuvant hormonal therapy.    Any Rx after that is unknown. CT (12.30.21 @ 14:12) >  BONES: Degenerative changes. T11 compression deformity, unchanged.   Redemonstration of an indeterminate lytic lesion in the right acetabulum.  Patient was in hospital at that time with perforated appendix with collection, managed conservatively.  Further evaluation was hampered by her comorbidities though thought was to get bone scan eventually as outpatient.  Now appears to have lytic lesions in T spine  Paraproteinemia eval. sent  MRI spine and CT abdomen is on order  Dx can only be made with bone bx when stable and if desired  I had a long discussion with patient's son Sy 8/30/22. .   He was told about new findings on scars.  He understood the limitations of management for his mother because of respiratory status and co morbidities. He also understands the limitations of giving any treatment even if dx is made  However if possible at some time, he would like to know dx.  He finally said, he will talk to his brother.  For now concentrate on getting her respiratory status better.   If down the line if her condition improves in couple of months reconsider getting dx with biopsy as he would like to know that.  < from: CT Abdomen and Pelvis w/ IV Cont (08.31.22 @ 18:52) >  MPRESSION:    Multiple lytic lesions are seen throughout the axial and appendicular   skeleton, some of which appear increased in size from CT abdomen pelvis   3/16/2022 when evaluated in retrospect. A few lytic lesions involve the   left intertrochanteric region and right acetabulum.    Previously ordered paraproteinemia eval was not done, reordered, important because of diffuse lytic lesions

## 2022-09-01 NOTE — PROGRESS NOTE ADULT - SUBJECTIVE AND OBJECTIVE BOX
Pt is an 87yo F w/ PMH of severe tracheobronchomalacia, HTN, HLD, CAD, CVA w/ residual L sided weakness, Intrinsic lung disease, Aortic stenosis, R breast CA s/p mastectomy, L lung collapse and MRSE p/w dyspnea from rehab.   She has a previous hx of resp failure d/t mucus plugging w/ complete L lung collapse and severe tracheobronchomalacia requiring hospitalization at [facility] in March 2022. After discharge to rehab pt was maintained on BiPAP for about one month's time during the evening hours but was subsequently taken off. She was reportedly doing well until until started complaining of an inability to breath and having increased WOB prompting her ED visit 8/28/22.   In the ED she was noted to be tachycardic and tachypneic w/ labs significant for a leukocytosis and positive UA. CXR w/ LLL opacity vs atelectasis. She was administered Vanc, Zosyn, Tylenol and Duoneb treatments   PAST MEDICAL & SURGICAL HISTORY:  MI (myocardial infarction)      Personal history of asbestosis      Gout      UTI (lower urinary tract infection)      ETOH abuse      CVA (cerebral vascular accident)      Tracheobronchomalacia      HTN (hypertension)      HLD (hyperlipidemia)      History of left shoulder fracture      History of benign breast tumor        Allergies    Toradol (Urticaria (Mild to Mod); Rash (Mild to Mod))    Intolerances      Social History:  - Denies tobacco use  - Former heavy EtOH abuse  - Denies illicit drug use (28 Aug 2022 04:43)    Medications:  albuterol/ipratropium for Nebulization 3 milliLiter(s) Nebulizer every 6 hours  allopurinol 100 milliGRAM(s) Oral daily  atorvastatin 20 milliGRAM(s) Oral at bedtime  ceFAZolin   IVPB 1000 milliGRAM(s) IV Intermittent every 8 hours  cyanocobalamin 1000 MICROGram(s) Oral daily  dextrose 5% + lactated ringers. 1000 milliLiter(s) IV Continuous <Continuous>  enoxaparin Injectable 40 milliGRAM(s) SubCutaneous every 24 hours  escitalopram 10 milliGRAM(s) Oral daily  HYDROmorphone  Injectable 0.25 milliGRAM(s) IV Push every 8 hours PRN Severe Pain (7 - 10)  HYDROmorphone  Injectable 0.25 milliGRAM(s) IV Push once  levETIRAcetam  Solution 750 milliGRAM(s) Oral two times a day  levothyroxine 75 MICROGram(s) Oral daily  potassium chloride    Tablet ER 40 milliEquivalent(s) Oral every 4 hours  psyllium Powder 1 Packet(s) Oral daily  sodium chloride 3%  Inhalation 4 milliLiter(s) Inhalation every 12 hours  traMADol 50 milliGRAM(s) Oral two times a day PRN Severe Pain (7 - 10)    Labs:  CBC Full  -  ( 01 Sep 2022 09:13 )  WBC Count : 9.12 K/uL  RBC Count : 2.99 M/uL  Hemoglobin : 8.9 g/dL  Hematocrit : 30.4 %  Platelet Count - Automated : 243 K/uL  Mean Cell Volume : 101.7 fl  Mean Cell Hemoglobin : 29.8 pg  Mean Cell Hemoglobin Concentration : 29.3 gm/dL  Auto Neutrophil # : x  Auto Lymphocyte # : x  Auto Monocyte # : x  Auto Eosinophil # : x  Auto Basophil # : x  Auto Neutrophil % : x  Auto Lymphocyte % : x  Auto Monocyte % : x  Auto Eosinophil % : x  Auto Basophil % : x    09-01    141  |  103  |  14  ----------------------------<  140<H>  3.1<L>   |  24  |  0.60    Ca    9.2      01 Sep 2022 09:16  Phos  2.9     09-01  Mg     1.8     09-01    TPro  7.0  /  Alb  3.8  /  TBili  0.2  /  DBili  x   /  AST  18  /  ALT  14  /  AlkPhos  118  09-01      Radiology:             ROS:  Patient comfortable without distress  No SOB or chest pain  No palpitation  No abdominal pain, diarrhaea or constipation  No weakness of extremities  No skin changes or swelling of legs  Rest of the comprehensive ROS was negative  Vital Signs Last 24 Hrs  T(C): 36.9 (01 Sep 2022 13:34), Max: 36.9 (01 Sep 2022 13:34)  T(F): 98.5 (01 Sep 2022 13:34), Max: 98.5 (01 Sep 2022 13:34)  HR: 88 (01 Sep 2022 13:34) (87 - 92)  BP: 148/68 (01 Sep 2022 13:34) (94/61 - 148/68)  BP(mean): --  RR: 20 (01 Sep 2022 13:34) (18 - 20)  SpO2: 95% (01 Sep 2022 13:34) (95% - 98%)    Parameters below as of 01 Sep 2022 13:34  Patient On (Oxygen Delivery Method): room air        Physical exam:  Patient comfortable. Obese  No distress  CVS: S1, S2   Chest: bilateral breath sound without rales  Abdomen: soft, not tender, no organomegaly or masses. Peg +  CNS: MIld old left sided weakness  Musculoskeletal:  Normal range of motion  Skin: No rash    Assessment and Plan:

## 2022-09-01 NOTE — PROGRESS NOTE ADULT - ASSESSMENT
ASSESSMENT:    Asked by the medical housestaff to evaluate the patient in pulmonary consultation. The patient is a 87 year old gentlewoman, lifelong non-smoker, well known to me without history of intrinsic lung disease. 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 She also has a history of HTN, HLD, CAD s/p MI with preserved LVEF and moderate aortic stenosis. The patient was admitted to [facility] in December 2021 with fever and abdominal pain. She was found to have perforated appendicitis with abscess formation. She was treated with tube drainage and antibiotics for a polymicrobial infection. Hospital course was complicated by respiratory failure due to mucous plugging with complete collapse of the left lung. She underwent several bronchoscopies for pulmonary toilet and was found to have severe tracheobronchomalacia. She was treated with bronchodilators, mucolytic nebs, chest vest and nocturnal AVAPS with expansion of the left upper lobe and some reexpansion of the left lower lobe. She was admitted in March with hematochezia with a surprisingly stable respiratory status. The left lower lobe was well aerated on a CT scan of the abdomen and pelvis with only bibasilar subsegmental atelectasis - there was scattered sigmoid diverticulosis without evidence of diverticulitis - interval decrease in size of a fluid collection in the region of the previously perforated appendicitis measuring 2.8 x 1.5 cm previously measuring 4.0 x 2.2 cm - slight increase in mild adjacent inflammatory change. The GI bleed was treated conservatively.  She has been doing "well" at rehab although she remains bedbound and NPO. She is no longer wearing nocturnal NIV.  She returns with shortness of breath in the setting of back, neck and left lower extremity pain. ENT evaluation -> no upper airway pathology. The patient had been placed on BIPAP for mild acute hypercapnic respiratory failure that has resolved and to buttress open the airways. She is awake and alert. She currently has no shortness of breath or hypoxemia on room air. No cough, sputum production, hemoptysis, chest congestion or wheeze. No fevers, chills or sweats. No chest pain/pressure or palpitations.     the patient returns from [facility] rehab with dyspnea with mild hypercapnic respiratory failure in the setting of neck, back and left leg pain (chronically contracted) - the respiratory acidosis has resolved with NIV support and the patient is now without shortness of breath or hypoxemia on room air - the patient has severe tracheobronchomalacia that has resulted in mucous plugging with complete left lung collapse requiring several bronchoscopies for pulmonary toilet - the patient was felt not to be a candidate for surgery for the tracheobronchomalacia and stenting was felt to have more risk than benefit - she has not had significant mucous plugging for ~ 6 months - CXR reveals a poor inspiratory effort and subsegmental left lower lobe atelectasis - it is unlikely that she has pneumonia despite the mild leukocytosis    PLAN/RECOMMENDATIONS:    stable oxygenation on room air  no longer using BIPAP for sleep   on cefazolin (?)  CT scan -> bilateral dependent atelectasis - resolution of left lower lobe complete collapse - multiple lytic bone lesions predominantly in the thoracic vertebral bodies which is concerning for metastatic disease   no indication for bronchoscopy at this time  humeral radiograph -> redemonstrated focal lytic lesion in proximal medial humeral diaphysis with small area of permeative cortical destruction.  CT scan -> multiple lytic lesions are seen throughout the axial and appendicular skeleton, some of which appear increased in size from CT abdomen pelvis 3/16/2022 when evaluated in retrospect - a few lytic lesions involve the left intertrochanteric region and right acetabulum  it is likely that the patient has metastatic breast cancer  incentive spirometry  acapella device  observe off antibiotics  albuterol/atrovent nebs q6h  hypertonic saline nebs q12h  guaifenesin 300mg 4 times daily via PEG  puree diet with moderately thickened fluids -  hyponatremia has resolved  treatment of HTN, HLD, CAD, aortic stenosis per cardiology  DVT prophylaxis  analgesics    Thank you for the courtesy of this referral. Plan of care discussed with the patient and her son at bedside and with the medical housestaff.    Kennedy Marcano MD, [facility]  [phone]  Pulmonary Medicine

## 2022-09-01 NOTE — PROGRESS NOTE ADULT - SUBJECTIVE AND OBJECTIVE BOX
CARDIOLOGY     PROGRESS  NOTE   ________________________________________________    CHIEF COMPLAINT:Patient is a 87y old  Female who presents with a chief complaint of SOB (01 Sep 2022 07:02)  doing well.  	  REVIEW OF SYSTEMS:  CONSTITUTIONAL: No fever, weight loss, or fatigue  EYES: No eye pain, visual disturbances, or discharge  ENT:  No difficulty hearing, tinnitus, vertigo; No sinus or throat pain  NECK: No pain or stiffness  RESPIRATORY: No cough, wheezing, chills or hemoptysis; No Shortness of Breath  CARDIOVASCULAR: No chest pain, palpitations, passing out, dizziness, or leg swelling  GASTROINTESTINAL: No abdominal or epigastric pain. No nausea, vomiting, or hematemesis; No diarrhea or constipation. No melena or hematochezia.  GENITOURINARY: No dysuria, frequency, hematuria, or incontinence  NEUROLOGICAL: No headaches, memory loss, loss of strength, numbness, or tremors  SKIN: No itching, burning, rashes, or lesions   LYMPH Nodes: No enlarged glands  ENDOCRINE: No heat or cold intolerance; No hair loss  MUSCULOSKELETAL: No joint pain or swelling; No muscle, back, or extremity pain  PSYCHIATRIC: No depression, anxiety, mood swings, or difficulty sleeping  HEME/LYMPH: No easy bruising, or bleeding gums  ALLERGY AND IMMUNOLOGIC: No hives or eczema	    [ ] All others negative	  [ ] Unable to obtain    PHYSICAL EXAM:  T(C): 36.4 (09-01-22 @ 05:15), Max: 37 (08-31-22 @ 09:15)  HR: 90 (09-01-22 @ 05:15) (74 - 92)  BP: 120/53 (09-01-22 @ 05:15) (94/61 - 120/53)  RR: 20 (09-01-22 @ 05:15) (18 - 20)  SpO2: 96% (09-01-22 @ 05:15) (95% - 100%)  Wt(kg): --  I&O's Summary    31 Aug 2022 07:01  -  01 Sep 2022 07:00  --------------------------------------------------------  IN: 1470 mL / OUT: 1450 mL / NET: 20 mL        Appearance: Normal	  HEENT:   Normal oral mucosa, PERRL, EOMI	  Lymphatic: No lymphadenopathy  Cardiovascular: Normal S1 S2, No JVD, + murmurs, No edema  Respiratory: rhonchi  Psychiatry: A & O x 3, Mood & affect appropriate  Gastrointestinal:  Soft, Non-tender, + BS	  Skin: No rashes, No ecchymoses, No cyanosis	  Neurologic: Non-focal  Extremities: Normal range of motion, No clubbing, cyanosis or edema  Vascular: Peripheral pulses palpable 2+ bilaterally    MEDICATIONS  (STANDING):  albuterol/ipratropium for Nebulization 3 milliLiter(s) Nebulizer every 6 hours  allopurinol 100 milliGRAM(s) Oral daily  atorvastatin 20 milliGRAM(s) Oral at bedtime  ceFAZolin   IVPB 1000 milliGRAM(s) IV Intermittent every 8 hours  cyanocobalamin 1000 MICROGram(s) Oral daily  dextrose 5% + lactated ringers. 1000 milliLiter(s) (80 mL/Hr) IV Continuous <Continuous>  enoxaparin Injectable 40 milliGRAM(s) SubCutaneous every 24 hours  escitalopram 10 milliGRAM(s) Oral daily  HYDROmorphone  Injectable 0.25 milliGRAM(s) IV Push once  levETIRAcetam  Solution 750 milliGRAM(s) Oral two times a day  levothyroxine 75 MICROGram(s) Oral daily  psyllium Powder 1 Packet(s) Oral daily  sodium chloride 3%  Inhalation 4 milliLiter(s) Inhalation every 12 hours      TELEMETRY: 	    ECG:  	  RADIOLOGY:  OTHER: 	  	  LABS:	 	    CARDIAC MARKERS:                                8.9    10.03 )-----------( 249      ( 30 Aug 2022 12:12 )             29.7     08-30    139  |  100  |  31<H>  ----------------------------<  154<H>  3.4<L>   |  25  |  0.74    Ca    9.7      30 Aug 2022 12:12  Phos  3.4     08-30  Mg     2.2     08-30      proBNP: Serum Pro-Brain Natriuretic Peptide: 155 pg/mL (08-27 @ 21:06)    Lipid Profile:   HgA1c:   TSH:   PT/INR - ( 30 Aug 2022 12:12 )   PT: 13.8 sec;   INR: 1.20 ratio         PTT - ( 30 Aug 2022 12:12 )  PTT:32.1 sec      Assessment and plan  ---------------------------  Pt is an 87yo F w/ PMH of severe tracheobronchomalacia, HTN, HLD, CAD, CVA w/ residual L sided weakness, Intrinsic lung disease, Aortic stenosis, R breast CA s/p mastectomy, L lung collapse and MRSE p/w dyspnea from rehab.   Unable to obtain further history from patient d/t dyspnea and use of CPAP. Remained of history provided by chart review and collateral from pt's son.  She has a previous hx of resp failure d/t mucus plugging w/ complete L lung collapse and severe tracheobronchomalacia requiring hospitalization at [facility] in March 2022. After discharge to rehab pt was maintained on BiPAP for about one month's time during the evening hours but was subsequently taken off. She was reportedly doing well until until started complaining of an inability to breath and having increased WOB prompting her ED visit.   In the ED she was noted to be tachycardic and tachypneic w/ labs significant for a leukocytosis and positive UA. CXR w/ LLL opacity vs atelectasis. She was administered Vanc, Zosyn, Tylenol and Duoneb treatments and cultures were sent to r/o infectious etiology.  pt is well known to me with hx of htn, tracheomalacia with multiple admission with l lung collapse, htn, cad, chf diastolic dysfunction with perforated appendicitis.  sob ?to lung collapse increase o2 / neb  ct chest no contrast  continue po Cardizem  MODERATE AS, continue to follow, no need to repeat echo  pt with hx of ASHD/ MI, ?asa daily, pt with hx of GI bleed on the last admission  dvt prophylaxis  continue Bipap at night  borderline low bp if symptomatic will consider adding midodrine  check le venous doppler, awaiting  ct chest noted, r/o metastatic disease/ onc noted

## 2022-09-01 NOTE — PROGRESS NOTE ADULT - SUBJECTIVE AND OBJECTIVE BOX
afberile    REVIEW OF SYSTEMS:  GEN: no fever,    no chills  RESP: no SOB,   no cough  CVS: no chest pain,   no palpitations  GI: no abdominal pain,   no nausea,   no vomiting,   no constipation,   no diarrhea  : no dysuria,   no frequency  NEURO: no headache,   no dizziness  PSYCH: no depression,   not anxious  Derm : no rash    MEDICATIONS  (STANDING):  albuterol/ipratropium for Nebulization 3 milliLiter(s) Nebulizer every 6 hours  allopurinol 100 milliGRAM(s) Oral daily  atorvastatin 20 milliGRAM(s) Oral at bedtime  cyanocobalamin 1000 MICROGram(s) Oral daily  dextrose 5% + lactated ringers. 1000 milliLiter(s) (80 mL/Hr) IV Continuous <Continuous>  diltiazem    Tablet 30 milliGRAM(s) Oral three times a day  enoxaparin Injectable 40 milliGRAM(s) SubCutaneous every 24 hours  escitalopram 10 milliGRAM(s) Oral daily  guaiFENesin Oral Liquid (Sugar-Free) 300 milliGRAM(s) Oral every 6 hours  HYDROmorphone  Injectable 0.25 milliGRAM(s) IV Push once  levETIRAcetam  Solution 750 milliGRAM(s) Oral two times a day  levothyroxine 75 MICROGram(s) Oral daily  psyllium Powder 1 Packet(s) Oral daily  sodium chloride 3%  Inhalation 4 milliLiter(s) Inhalation every 12 hours    MEDICATIONS  (PRN):  HYDROmorphone  Injectable 0.25 milliGRAM(s) IV Push every 8 hours PRN Severe Pain (7 - 10)  traMADol 50 milliGRAM(s) Oral two times a day PRN Severe Pain (7 - 10)      Vital Signs Last 24 Hrs  T(C): 36.4 (01 Sep 2022 05:15), Max: 37 (31 Aug 2022 09:15)  T(F): 97.5 (01 Sep 2022 05:15), Max: 98.6 (31 Aug 2022 09:15)  HR: 90 (01 Sep 2022 05:15) (74 - 92)  BP: 120/53 (01 Sep 2022 05:15) (94/61 - 120/53)  BP(mean): --  RR: 20 (01 Sep 2022 05:15) (18 - 20)  SpO2: 96% (01 Sep 2022 05:15) (95% - 100%)    Parameters below as of 01 Sep 2022 05:15  Patient On (Oxygen Delivery Method): room air      CAPILLARY BLOOD GLUCOSE        I&O's Summary    31 Aug 2022 07:01  -  01 Sep 2022 07:00  --------------------------------------------------------  IN: 920 mL / OUT: 1250 mL / NET: -330 mL        PHYSICAL EXAM:  HEAD:  Atraumatic, Normocephalic  NECK: Supple, No   JVD  CHEST/LUNG:   no     rales,     no,    rhonchi  HEART: Regular rate and rhythm;         murmur  ABDOMEN: Soft, Nontender, ;   EXTREMITIES:        edema  NEUROLOGY:  alert    LABS:                        8.9    10.03 )-----------( 249      ( 30 Aug 2022 12:12 )             29.7     08-30    139  |  100  |  31<H>  ----------------------------<  154<H>  3.4<L>   |  25  |  0.74    Ca    9.7      30 Aug 2022 12:12  Phos  3.4     08-30  Mg     2.2     08-30      PT/INR - ( 30 Aug 2022 12:12 )   PT: 13.8 sec;   INR: 1.20 ratio         PTT - ( 30 Aug 2022 12:12 )  PTT:32.1 sec                        Consultant(s) Notes Reviewed:      Care Discussed with Consultants/Other Providers:

## 2022-09-01 NOTE — PROGRESS NOTE ADULT - SUBJECTIVE AND OBJECTIVE BOX
***************************************************************  Jaylen Gastelum, PGY1  Internal Medicine   pager:  [facility]: [phone] [facility]: [phone]  ***************************************************************    FRANKI MEHTA  87y  MRN: [id number]    Patient is a 87y old  Female who presents with a chief complaint of SOB (31 Aug 2022 11:35)      Interval/Overnight Events: no events ON. pending MRI CT family discussion Go[facility]    Subjective: Pt seen and examined at bedside. Denies fever, CP, SOB, abn pain, N/V, dysuria. Tolerating diet.      MEDICATIONS  (STANDING):  albuterol/ipratropium for Nebulization 3 milliLiter(s) Nebulizer every 6 hours  allopurinol 100 milliGRAM(s) Oral daily  atorvastatin 20 milliGRAM(s) Oral at bedtime  cyanocobalamin 1000 MICROGram(s) Oral daily  dextrose 5% + lactated ringers. 1000 milliLiter(s) (80 mL/Hr) IV Continuous <Continuous>  diltiazem    Tablet 30 milliGRAM(s) Oral three times a day  enoxaparin Injectable 40 milliGRAM(s) SubCutaneous every 24 hours  escitalopram 10 milliGRAM(s) Oral daily  guaiFENesin Oral Liquid (Sugar-Free) 300 milliGRAM(s) Oral every 6 hours  HYDROmorphone  Injectable 0.25 milliGRAM(s) IV Push once  levETIRAcetam  Solution 750 milliGRAM(s) Oral two times a day  levothyroxine 75 MICROGram(s) Oral daily  psyllium Powder 1 Packet(s) Oral daily  sodium chloride 3%  Inhalation 4 milliLiter(s) Inhalation every 12 hours    MEDICATIONS  (PRN):  HYDROmorphone  Injectable 0.25 milliGRAM(s) IV Push every 8 hours PRN Severe Pain (7 - 10)  traMADol 50 milliGRAM(s) Oral two times a day PRN Severe Pain (7 - 10)      Objective:    Vitals: Vital Signs Last 24 Hrs  T(C): 36.4 (09-01-22 @ 05:15), Max: 37 (08-31-22 @ 09:15)  T(F): 97.5 (09-01-22 @ 05:15), Max: 98.6 (08-31-22 @ 09:15)  HR: 90 (09-01-22 @ 05:15) (74 - 92)  BP: 120/53 (09-01-22 @ 05:15) (94/61 - 120/53)  BP(mean): --  RR: 20 (09-01-22 @ 05:15) (18 - 20)  SpO2: 96% (09-01-22 @ 05:15) (95% - 100%)                I&O's Summary    30 Aug 2022 07:01  -  31 Aug 2022 07:00  --------------------------------------------------------  IN: 2500 mL / OUT: 550 mL / NET: 1950 mL    31 Aug 2022 07:01  -  01 Sep 2022 06:33  --------------------------------------------------------  IN: 920 mL / OUT: 1250 mL / NET: -330 mL        PHYSICAL EXAM:  GENERAL: NAD, obese  HEAD:  Atraumatic, Normocephalic  EYES: EOMI, conjunctiva and sclera clear  CHEST/LUNG: +wheezes b/l. limited anterior exam due to body habitus  HEART: S1 S2 RRR. Limited exam due to body habitus  ABDOMEN: Soft, Nontender, Nondistended;   SKIN: No rashes or lesions  NERVOUS SYSTEM:  Alert & Oriented X3, no focal deficits. Easily distractible.    LABS:  08-30    139  |  100  |  31<H>  ----------------------------<  154<H>  3.4<L>   |  25  |  0.74    Ca    9.7      30 Aug 2022 12:12  Phos  3.4     08-30  Mg     2.2     08-30        PT/INR - ( 30 Aug 2022 12:12 )   PT: 13.8 sec;   INR: 1.20 ratio         PTT - ( 30 Aug 2022 12:12 )  PTT:32.1 sec                                        8.9    10.03 )-----------( 249      ( 30 Aug 2022 12:12 )             29.7     CAPILLARY BLOOD GLUCOSE          RADIOLOGY & ADDITIONAL TESTS:    Imaging Personally Reviewed:  [x ] YES  [ ] NO    Consultants involved in case:   Consultant(s) Notes Reviewed:  [ x] YES  [ ] NO:   Care Discussed with Consultants/Other Providers [x ] YES  [ ] NO

## 2022-09-01 NOTE — PROGRESS NOTE ADULT - ASSESSMENT
89    year old female    h/o hemorrhagic CVA, has  PEG,  HTN, MI,   HLD, gout, right ca  breast   right Ca breast. s/p mastectomy in 2019,  s/p  sentinel node  localization.  4/4,  were  negative  peg dislodged.  IR   replacements  on 1/3/22  s/p rrt  . in past,  for hypoxia. cxr with complete  collapsed  of  left lung, needing broch,   s/ p  bronchoscopy , pt  with  tracheomalacia  and  collapsed  airways,  makes  this a  difficult  situation, as there is no good rx for this     h/o bacteremia. on   pna, perforated  appendicitis,  ?  mets  to  acetabulum, was  seen by dr pacheco   surg/ IR  and  oncology eval dr pacheco   sa w pt.  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 this will not alter rx. pt  will need   extended  course  of ab   on iv  vanco and ertapenem.  till  2/9/.22  ID dr reagan, and per  surg  and  IR  eval,  no intervention   CT  1/20/22, no PE. collection in right side   s/p  rectal bleed.        *  admitted with sob    ENT eval w/ laryngoscopy notable for vocal cords mobile and intact, no edema, upper airway patent   Duonebs and hypertonic saline for airway clearance    Zosyn empiric for pna    *   s/p cva, has  PEG,  npo  ,   on  synthroid, Keppra  ,  *  HTN, on meds  per  card  *  h/o  ca breast. /, s/p  R  mastectomy  *   obesity, bmi  was   41, now  is  30       c/c left  leg contracture ,  remains  bed  bound. functional  paraplegias  needs  assistance  for  all her  adl's   *  pt  with  h/o  tracheomalacia  and  collapsed  airways,      given pt;s  mlple co morbidities,  pt is  at risk for  re admissions     on nocturnal  bipap    difficult  situation, a s there  is no  good  rx  for  pt's  recurrent lung collapse hypoxia    h/o  of  chronic    mild  tachycardia   re  admission  likely,, given her  airways, and  propensity  for  lung collapse   pt  is  oxygen dependent /  now  on N/ C       on iv  zosyn. edwin to  5  days      G  tube  replacement   by IR/    Feest  by  swallow team    *   CT chest. lytic  lesions, T  spine/  ribs/ humerus/  oncoloigy  magnus pacheco.  suspect mets  from ?    ca breast/  h/o  mastectomy       called son. Sy, updated/ oncologist  has  also  discussed  with  son,  futility of  pursuing  bx. a s pt  is  not an ideal  candidate  for  chemo    son wishes  to  speak with   siblings  and  then  will get back  to  dr pacheco      MRI T  spine , pending/     CT  A.p ,/ prelim ,  pelvic  mets,  starnding  near  L  arm iv site  spoke  with  RN  this  am,  to  remove  this  iv    arm  doppler  / iv ancef/  hous e ID      dr arora  is covering      per  son, pt is  full code

## 2022-09-01 NOTE — PROGRESS NOTE ADULT - SUBJECTIVE AND OBJECTIVE BOX
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;     INTERVAL HISTORY: started on a puree diet with moderately thickened fluids; PEG tube replaced; seems more confused and with increased pain; no shortness of breath or hypoxemia on room air; no cough, sputum production, hemoptysis, chest congestion or wheeze; no fevers, chills or sweats; no chest pain/pressure or palpitations;    REVIEW OF SYSTEMS:  Constitutional: As per interval history  HEENT: Within normal limits  CV: As per interval history  Resp: As per interval history  GI: dysphagia  : Within normal limits  Musculoskeletal: Within normal limits  Skin: Within normal limits  Neurological: CVA - left sided plegia  Psychiatric: Within normal limits  Endocrine: Within normal limits  Hematologic/Lymphatic: Within normal limits  Allergic/Immunologic: Within normal limits    MEDICATIONS:     Pulmonary "  albuterol/ipratropium for Nebulization 3 milliLiter(s) Nebulizer every 6 hours  sodium chloride 3%  Inhalation 4 milliLiter(s) Inhalation every 12 hours    Anti-microbials:  ceFAZolin   IVPB 1000 milliGRAM(s) IV Intermittent every 8 hours    Cardiovascular:    Other:  allopurinol 100 milliGRAM(s) Oral daily  atorvastatin 20 milliGRAM(s) Oral at bedtime  cyanocobalamin 1000 MICROGram(s) Oral daily  dextrose 5% + lactated ringers. 1000 milliLiter(s) IV Continuous <Continuous>  enoxaparin Injectable 40 milliGRAM(s) SubCutaneous every 24 hours  escitalopram 10 milliGRAM(s) Oral daily  HYDROmorphone  Injectable 0.25 milliGRAM(s) IV Push once  levETIRAcetam  Solution 750 milliGRAM(s) Oral two times a day  levothyroxine 75 MICROGram(s) Oral daily  potassium chloride    Tablet ER 40 milliEquivalent(s) Oral every 4 hours  psyllium Powder 1 Packet(s) Oral daily    MEDICATIONS  (PRN):  HYDROmorphone  Injectable 0.25 milliGRAM(s) IV Push every 8 hours PRN Severe Pain (7 - 10)  traMADol 50 milliGRAM(s) Oral two times a day PRN Severe Pain (7 - 10)      OBJECTIVE:    PHYSICAL EXAM:       ICU Vital Signs Last 24 Hrs  T(C): 36.9 (01 Sep 2022 13:34), Max: 36.9 (01 Sep 2022 13:34)  T(F): 98.5 (01 Sep 2022 13:34), Max: 98.5 (01 Sep 2022 13:34)  HR: 88 (01 Sep 2022 13:34) (86 - 92)  BP: 148/68 (01 Sep 2022 13:34) (94/61 - 148/68)  BP(mean): --  ABP: --  ABP(mean): --  RR: 20 (01 Sep 2022 13:34) (18 - 20)  SpO2: 95% (01 Sep 2022 13:34) (95% - 100%) on room air    General: Awake and alert. Cooperative. No distress Appears stated age. Bedbound  HEENT: Atraumatic. Normocephalic. Anicteric. Normal oral mucosa. PERRL. EOMI. Mallampati class IV airway.  Neck: Supple. Trachea midline. Thyroid without enlargement/tenderness/nodules. No carotid bruit. No JVD. Short and wide  Cardiovascular: Regular rate and rhythm. S1 S2 normal. III/VI systolic murmur  Respiratory: Respirations unlabored. Left basilar rales. No wheeze. Kyphosis. Poor chest wall excursion  Abdomen: Soft. Non-tender. Non-distended. No organomegaly. No masses. Normal bowel sounds. Obese. PEG.  Extremities: Warm to touch. No clubbing or cyanosis. No pedal edema. Large legs. Left leg contracted under the right leg  Pulses: 2+ peripheral pulses all extremities.	  Skin: Normal skin color. No rashes or lesions. No ecchymoses. No cyanosis. Warm to touch.  Lymph Nodes: Cervical, supraclavicular and axillary nodes normal  Neurological: Left lower extremity plegia with contraction. Left upper extremity is quite weak. A and O x 3  Psychiatry: Appropriate mood and affect.      LABS:                          8.9    9.12  )-----------( 243      ( 01 Sep 2022 09:13 )             30.4     CBC    WBC  9.12 <==, 10.03 <==, 13.93 <==, 14.46 <==    Hemoglobin  8.9 <<==, 8.9 <<==, 9.6 <<==, 10.1 <<==    Hematocrit  30.4 <==, 29.7 <==, 30.6 <==, 31.8 <==    Platelets  243 <==, 249 <==, 296 <==, 298 <==      141  |  103  |  14  ----------------------------<  140<H>    09-01  3.1<L>   |  24  |  0.60      LYTES    sodium  141 <==, 139 <==, 132 <==, 130 <==, 132 <==, 131 <==    potassium   3.1 <==, 3.4 <==, 4.9 <==, 5.7 <==, 5.3 <==, 5.8 <==    chloride  103 <==, 100 <==, 94 <==, 93 <==, 94 <==, 94 <==    carbon dioxide  24 <==, 25 <==, 22 <==, 20 <==, 21 <==, 22 <==    =============================================================================================  RENAL FUNCTION:    Creatinine:   0.60  <<==, 0.74  <<==, 0.72  <<==, 0.69  <<==, 0.78  <<==, 0.69  <<==    BUN:   14 <==, 31 <==, 44 <==, 44 <==, 47 <==, 44 <==    ============================================================================================    calcium   9.2 <==, 9.7 <==, 9.7 <==, 9.8 <==, 9.9 <==, 9.8 <==    phos   2.9 <==, 3.4 <==, 4.7 <==    mag   1.8 <==, 2.2 <==, 2.3 <==    ============================================================================================  LFTs    AST:   18 <== , 37 <== , 29 <==     ALT:  14  <== , 16  <== , 16  <==     AP:  118  <=, 137  <=, 137  <=    Bili:  0.2  <=, 0.2  <=, 0.2  <=    PT/INR - ( 30 Aug 2022 12:12 )   PT: 13.8 sec;   INR: 1.20 ratio      Venous Blood Gas:   @ 22:32  7.36/47/48/27/79.0  VBG Lactate: --    Venous Blood Gas:   @ 20:50  7.31/48/57/24/86.9  VBG Lactate: 2.5    PT/INR - ( 30 Aug 2022 12:12 )   PT: 13.8 sec;   INR: 1.20 ratio       PTT - ( 30 Aug 2022 12:12 )  PTT:32.1 sec    Serum Pro-Brain Natriuretic Peptide: 155 pg/mL ( @ 21:06)    CARDIAC MARKERS ( 27 Aug 2022 21:06 )  CPK x     /CKMB x     /CKMB Units x        troponin 21 ng/L    < from: TTE with Doppler (w/Cont) (22 @ 14:08) >    Patient name: FRANKI MEHTA  YOB: 1935   Age: 86 (F)   MR#: [id number]  Study Date: 2022  ------------------------------------------------------------------------  Dimensions:    Normal Values:  LA:     2.5    2.0 - 4.0 cm  Ao:     3.3    2.0 - 3.8 cm  SEPTUM: 1.6    0.6 - 1.2 cm  PWT:    0.9    0.6 - 1.1 cm  LVIDd:  3.6    3.0 - 5.6 cm  LVIDs:  2.5    1.8 - 4.0 cm  Derived variables:  LVMI: 77 g/m2  RWT: 0.50  Fractional short: 31 %  EF (Visual Estimate): 70-75 %  ------------------------------------------------------------------------  Conclusions:  1. Concentric left ventricular hypertrophy.  2. Hyperdynamic left ventricular systolic function.  Endocardial visualization enhanced with intravenous  injection of Ultrasonic Enhancing Agent (Definity).  3. The right ventricle is not well visualized; grossly  normal right ventricular systolic function.  Pt refused to proceed with exam.  Limited Doppler study.  No trans aortic gradients.  Unable to rule out endocarditis.  ------------------------------------------------------------------------  Confirmed on 2022 - 15:42:57 by COMPA Castillo  ------------------------------------------------------------------------  ---------------------------------------------------------------------------------------------------------------  MICROBIOLOGY:     Respiratory Viral Panel with COVID-19 by RYAN (22 @ 21:05)   Rapid RVP Result: [facility]   SARS-CoV-2: [facility]  This Respiratory Panel uses polymerase chain reaction (PCR) to detect for   adenovirus; coronavirus (HKU1, NL63, 229E, OC43); human metapneumovirus   (hMPV); human enterovirus/rhinovirus (Entero/RV); influenza A; influenza   A/H1; influenza A/H3; influenza A/H1-2009; influenza B; parainfluenza   viruses 1, 2, 3, 4; respiratory syncytial virus; Mycoplasma pneumoniae;   Chlamydophila pneumoniae; and SARS-CoV-2.     Urinalysis Basic - ( 27 Aug 2022 22:42 )    Color: Light Orange / Appearance: Turbid / S.013 / pH: x  Gluc: x / Ketone: Negative  / Bili: Negative / Urobili: Negative   Blood: x / Protein: 30 mg/dL / Nitrite: Negative   Leuk Esterase: Large / RBC: 8 /hpf / WBC 1145 /HPF   Sq Epi: x / Non Sq Epi: 1 /hpf / Bacteria: Moderate    Culture - Urine (22 @ 22:42)   - Nitrofurantoin: I 64 Should not be used to treat pyelonephritis.   - Tetra/Doxy: R >8   - Vancomycin: S 1   - Ampicillin: R >8 Predicts results to ampicillin/sulbactam, amoxacillin-clavulanate and piperacillin-tazobactam.   - Ciprofloxacin: R >2   - Levofloxacin: R >4   Specimen Source: Clean Catch Clean Catch (Midstream)   Culture Results:   >100,000 CFU/ml Enterococcus faecium   <10,000 CFU/ml Normal Urogenital ck present     Historical Values  Culture - Urine (22 @ 22:42)   Specimen Source: Clean Catch Clean Catch (Midstream)   Culture Results:   >100,000 CFU/ml Enterococcus faecium   <10,000 CFU/ml Normal Urogenital ck present   Organism Identification: Enterococcus faecium     Culture - Blood (22 @ 20:30)   Specimen Source: .Blood Blood-Peripheral   Culture Results:   No growth to date.     Culture - Blood (22 @ 20:15)   Specimen Source: .Blood Blood-Peripheral   Culture Results:   No growth to date.     MRSA/MSSA PCR (22 @ 05:23)   MRSA PCR Result.: NotDetec  Staph aureus PCR Result: NotDetec     RADIOLOGY:  [x ] Chest radiographs reviewed and interpreted by me    EXAM:  XR CHEST PORTABLE URGENT 1V                          PROCEDURE DATE:  2022      FINDINGS:    The heart size is not well evaluated in this projection.  Low lung volumes. Patchy left lower lung opacity is again seen, likely   atelectasis.  Possible small left pleural effusion. No pneumothorax.  No acute bony abnormalities. Partially visualized left humeral hardware.    IMPRESSION:    Patchy left lower lung opacity, likely atelectasis. Possible small left   pleural effusion.    MARKO GUAMAN DO; Resident Radiologist  This document has been electronically signed.  PRAVIN CASTILLO MD; Attending Radiologist  This document has been electronically signed. Aug 28 2022 10:09AM  ---------------------------------------------------------------------------------------------------------------  EXAM:  CT CHEST                          PROCEDURE DATE:  2022      FINDINGS:    LYMPH NODES: No mediastinal lymphadenopathy.    HEART/VASCULATURE: The heart is normal in size. No pericardial effusion.   Aortic valve calcifications noted.    AIRWAYS/LUNGS/PLEURA: Patent central airways. Right lower lobe dependent   atelectasis appear similar to prior. Patchy groundglass opacities the   right apex are improved. Interval resolution of left lower lobe complete   collapse. There is mild dependent atelectasis of the left upper and lower   lobes.    UPPER ABDOMEN: Gallbladder stones.    BONES/SOFT TISSUES: Status post right mastectomy. Status post left   humerus ORIF. Marked kyphosis. Multiple lytic bone lesions predominantly   in the posterior and lateral thoracic vertebral bodies. It is unclear if   there is any spinal canal extension. Lytic lesions are alsoseen at the   right posterior seventh rib and right humeral head    OTHER: Right maxillary sinus is opacified.      IMPRESSION:  Bilateral dependent atelectasis. Interval resolution of left lower lobe   complete collapse.    Multiple lytic bone lesions, predominantly in the thoracic vertebral   bodies which is concerning for metastatic disease. Unclear if there is   any spinal canal extension. Recommend MRI of the thoracic spine for   further evaluation.    BAILEY STARR MD; Resident Radiologist  This document has been electronically signed.  HAY IRVIN MD; Attending Radiologist  This document has been electronically signed. Aug 29 2022 12:01PM  ---------------------------------------------------------------------------------------------------------------  EXAM:  CT ABDOMEN AND PELVIS IC                          PROCEDURE DATE:  2022      IMPRESSION:    Multiple lytic lesions are seen throughout the axial and appendicular   skeleton, some of which appear increased in size from CT abdomen pelvis   3/16/2022 when evaluated in retrospect. A few lytic lesions involve the   left intertrochanteric region and right acetabulum.    Endometrial thickening. Recommend dedicated pelvic sonogram.    Cystic lesions are seen within the pancreas. Recommend a dedicated MRI on   a nonemergent basis.    SULY KENDRICK MD; Resident Radiology  This document has been electronically signed.  BRITTANI MALCOLM MD; Attending Radiologist  This document has been electronically signed. Sep  1 2022 11:01AM  ---------------------------------------------------------------------------------------------------------------  EXAM:  XR HUMERUS MIN 2 VIEWS RT                          PROCEDURE DATE:  2022      EXAM:  Internal/external rotation AP right humerus from 2022 at 0927.   Compared to appearance on previous day chest CT.    IMPRESSION:  Generalized osteopenia. Redemonstrated focal lytic lesion in proximal   medial humeral diaphysis with small area of permeative cortical   destruction. No additional radiographically appreciated suspicious lytic   or blastic lesions in remaining imaged regions.    No current fractures or dislocations.    Preserved shoulder and elbow joint spaces.    IV cannula with attached tubing overlies upper arm.    MINE NICE MD; Attending Radiologist  This document has been electronically signed. Aug 30 2022 10:39AM  ---------------------------------------------------------------------------------------------------------------  EXAM:  DUPLEX EXT VEINS UPPER LT                          PROCEDURE DATE:  2022      IMPRESSION:  No evidence of left upper extremity deep venous thrombosis.    FAWN FITZGERALD MD; Attending Radiologist  This document has been electronically signed. Sep  1 2022  1:10PM  ---------------------------------------------------------------------------------------------------------------  EXAM:  DUPLEX SCAN EXT VEINS LOWER BI                          PROCEDURE DATE:  2022      IMPRESSION:  No evidence of deep venous thrombosis in either lower extremity.    KEYLA ROMERO MD; Attending Radiologist  This document has been electronically signed. Aug 31 2022  7:59PM  ---------------------------------------------------------------------------------------------------------------

## 2022-09-01 NOTE — PROGRESS NOTE ADULT - PROBLEM SELECTOR PLAN 11
DVT ppx: Lovenox  Diet: NPO while on BiPAP  Dispo: Pending clinical improvement

## 2022-09-01 NOTE — PROGRESS NOTE ADULT - PROBLEM SELECTOR PLAN 5
Pt has been NPO with PEG tube feeds for past year. SLP consulted for possible pleasure feeds in setting of poor prognosis. PEG displaced 8/29 overnight, straight catheter inserted to maintain patency. PEG reinserted by IR 8/30, okay for use.    - resume PEG feeds  - discuss further w/ son Sy (proxy?)  - pending FEES to eval for swallowing

## 2022-09-01 NOTE — PROGRESS NOTE ADULT - PROBLEM SELECTOR PLAN 1
Dyspnea and increased WOB c/f underlying pna vs atelectasis d/t being off positive pressure ventilation  ENT laryngoscopy (8/29) vocal cords mobile and intact, no edema, upper airway patent.  MRSA (8/28) negative. BCx (8/27) NGTD x2; UCx (8/27) Gram+    - continue to wean O2, tolerating room air w/ SpO2>94%  - Duonebs and hypertonic saline for airway clearance  - c/w Zosyn (8/28-9/1) 5 day course  - pulm following; recs appreciated

## 2022-09-01 NOTE — PROGRESS NOTE ADULT - PROBLEM SELECTOR PLAN 2
Pt had R breast CA s/p mastectomy 4/2019, staged [id number] ER/RI+ and HER-2 negative, without adjuvant hormonal therapy. Prior CT (2021) showed possible lytic lesion, not worked up.  CT (8/29) b/l atelectasis, multiple lytic bone lesions concerning for metastatic disease.    - pending MRI, CTAP  - GoC w/ pt and children

## 2022-09-02 NOTE — PROGRESS NOTE ADULT - ASSESSMENT
ASSESSMENT:    Asked by the medical housestaff to evaluate the patient in pulmonary consultation. The patient is a 87 year old gentlewoman, lifelong non-smoker, well known to me without history of intrinsic lung disease. 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 She also has a history of HTN, HLD, CAD s/p MI with preserved LVEF and moderate aortic stenosis. The patient was admitted to [place] in December 2021 with fever and abdominal pain. She was found to have perforated appendicitis with abscess formation. She was treated with tube drainage and antibiotics for a polymicrobial infection. Hospital course was complicated by respiratory failure due to mucous plugging with complete collapse of the left lung. She underwent several bronchoscopies for pulmonary toilet and was found to have severe tracheobronchomalacia. She was treated with bronchodilators, mucolytic nebs, chest vest and nocturnal AVAPS with expansion of the left upper lobe and some reexpansion of the left lower lobe. She was admitted in March with hematochezia with a surprisingly stable respiratory status. The left lower lobe was well aerated on a CT scan of the abdomen and pelvis with only bibasilar subsegmental atelectasis - there was scattered sigmoid diverticulosis without evidence of diverticulitis - interval decrease in size of a fluid collection in the region of the previously perforated appendicitis measuring 2.8 x 1.5 cm previously measuring 4.0 x 2.2 cm - slight increase in mild adjacent inflammatory change. The GI bleed was treated conservatively.  She has been doing "well" at rehab although she remains bedbound and NPO. She is no longer wearing nocturnal NIV.  She returns with shortness of breath in the setting of back, neck and left lower extremity pain. ENT evaluation -> no upper airway pathology. The patient had been placed on BIPAP for mild acute hypercapnic respiratory failure that has resolved and to buttress open the airways. She is awake and alert. She currently has no shortness of breath or hypoxemia on room air. No cough, sputum production, hemoptysis, chest congestion or wheeze. No fevers, chills or sweats. No chest pain/pressure or palpitations.     the patient returns from [facility] rehab with dyspnea with mild hypercapnic respiratory failure in the setting of neck, back and left leg pain (chronically contracted) - the respiratory acidosis has resolved with NIV support and the patient is now without shortness of breath or hypoxemia on room air - the patient has severe tracheobronchomalacia that has resulted in mucous plugging with complete left lung collapse requiring several bronchoscopies for pulmonary toilet - the patient was felt not to be a candidate for surgery for the tracheobronchomalacia and stenting was felt to have more risk than benefit - she has not had significant mucous plugging for ~ 6 months - CXR reveals a poor inspiratory effort and subsegmental left lower lobe atelectasis - it is unlikely that she has pneumonia despite the mild leukocytosis    PLAN/RECOMMENDATIONS:    stable oxygenation on room air  VBG -> 7.31/51/45/26/73.4 -> mild respiratory and metabolic acidosis  no longer using BIPAP for sleep   on cefazolin (?)  CT scan -> bilateral dependent atelectasis - resolution of left lower lobe complete collapse - multiple lytic bone lesions predominantly in the thoracic vertebral bodies which is concerning for metastatic disease   no indication for bronchoscopy at this time  humeral radiograph -> redemonstrated focal lytic lesion in proximal medial humeral diaphysis with small area of permeative cortical destruction.  CT scan -> multiple lytic lesions are seen throughout the axial and appendicular skeleton, some of which appear increased in size from CT abdomen pelvis 3/16/2022 when evaluated in retrospect - a few lytic lesions involve the left intertrochanteric region and right acetabulum  it is likely that the patient has metastatic breast cancer  incentive spirometry  acapella device  observe off antibiotics  albuterol/atrovent nebs q6h  hypertonic saline nebs q12h  guaifenesin 300mg 4 times daily via PEG  puree diet with moderately thickened fluids   hyponatremia has resolved  treatment of HTN, HLD, CAD, aortic stenosis per cardiology  DVT prophylaxis  analgesics    Thank you for the courtesy of this referral. Plan of care discussed with the patient and her son at bedside and with the medical housestaff.    The patient's respiratory status remains stable. Dr. Tami Rendon will be covering our service for the holiday weekend. Please call [phone] with questions or clinical changes. Thank you.    Kennedy Marcano MD, [facility] [place]  [phone]  Pulmonary Medicine

## 2022-09-02 NOTE — PROGRESS NOTE ADULT - PROBLEM SELECTOR PLAN 1
Dyspnea and increased WOB c/f underlying pna vs atelectasis d/t being off positive pressure ventilation  ENT laryngoscopy (8/29) vocal cords mobile and intact, no edema, upper airway patent.  MRSA (8/28) negative. BCx (8/27) NGTD x2; UCx (8/27) Gram+    - tolerating room air w/ SpO2>94%  - Duonebs and hypertonic saline for airway clearance  - c/w Zosyn (8/28-9/1) 5 day course  - pulm following; recs appreciated

## 2022-09-02 NOTE — PROGRESS NOTE ADULT - NSPROGADDITIONALINFOA_GEN_ALL_CORE
Agree with above. Agree with above. Discussed with medical resident, discharge to [facility] Monday.     No further testing is required, bed bound on PEG feeds.  Consider Palliative Care eval Monday.     No further labs, complete all ABX.   Continue all current meds.

## 2022-09-02 NOTE — PROGRESS NOTE ADULT - PROBLEM SELECTOR PLAN 2
Pt had R breast CA s/p mastectomy 4/2019, staged [id number] ER/PA+ and HER-2 negative, without adjuvant hormonal therapy. Prior CT (2021) showed possible lytic lesion, not worked up.  CT Chest (8/29) b/l atelectasis, multiple lytic bone lesions concerning for metastatic disease  CTAP (8/31) multiple lytic lesions increased in size, thickened endometrium.  Paraproteinemia (9/2) pending  DVT scans negative    - heme following; recs appreciated  - pending MRI  - GoC w/ pt and children

## 2022-09-02 NOTE — PROGRESS NOTE ADULT - SUBJECTIVE AND OBJECTIVE BOX
***************************************************************  Jaylen Gastelum, PGY1  Internal Medicine   pager:  [facility]: [phone] [facility]: [phone]  ***************************************************************    FRANKI MEHTA  87y  MRN: [id number]    Patient is a 87y old  Female who presents with a chief complaint of SOB (01 Sep 2022 17:42)      Interval/Overnight Events: no events ON. pending MRI    Subjective: Pt seen and examined at bedside. Denies fever, CP, SOB, abn pain, N/V, dysuria. Tolerating diet.      MEDICATIONS  (STANDING):  albuterol/ipratropium for Nebulization 3 milliLiter(s) Nebulizer every 6 hours  allopurinol 100 milliGRAM(s) Oral daily  atorvastatin 20 milliGRAM(s) Oral at bedtime  ceFAZolin   IVPB 1000 milliGRAM(s) IV Intermittent every 8 hours  cyanocobalamin 1000 MICROGram(s) Oral daily  dextrose 5% + lactated ringers. 1000 milliLiter(s) (80 mL/Hr) IV Continuous <Continuous>  enoxaparin Injectable 40 milliGRAM(s) SubCutaneous every 24 hours  escitalopram 10 milliGRAM(s) Oral daily  HYDROmorphone  Injectable 0.25 milliGRAM(s) IV Push once  levETIRAcetam  Solution 750 milliGRAM(s) Oral two times a day  levothyroxine 75 MICROGram(s) Oral daily  psyllium Powder 1 Packet(s) Oral daily  sodium chloride 3%  Inhalation 4 milliLiter(s) Inhalation every 12 hours    MEDICATIONS  (PRN):  HYDROmorphone  Injectable 0.25 milliGRAM(s) IV Push every 8 hours PRN Severe Pain (7 - 10)  traMADol 50 milliGRAM(s) Oral two times a day PRN Severe Pain (7 - 10)      Objective:    Vitals: Vital Signs Last 24 Hrs  T(C): 36.7 (09-02-22 @ 05:04), Max: 36.9 (09-01-22 @ 13:34)  T(F): 98 (09-02-22 @ 05:04), Max: 98.5 (09-01-22 @ 13:34)  HR: 99 (09-02-22 @ 05:04) (88 - 100)  BP: 122/65 (09-02-22 @ 05:04) (106/71 - 148/68)  BP(mean): --  RR: 20 (09-02-22 @ 05:04) (20 - 20)  SpO2: 94% (09-02-22 @ 05:04) (94% - 96%)                I&O's Summary    31 Aug 2022 07:01  -  01 Sep 2022 07:00  --------------------------------------------------------  IN: 1500 mL / OUT: 1450 mL / NET: 50 mL    01 Sep 2022 07:01  -  02 Sep 2022 06:17  --------------------------------------------------------  IN: 860 mL / OUT: 800 mL / NET: 60 mL        PHYSICAL EXAM:  GENERAL: NAD, obese  HEAD:  Atraumatic, Normocephalic  EYES: EOMI, conjunctiva and sclera clear  CHEST/LUNG: +wheezes b/l. limited anterior exam due to body habitus  HEART: S1 S2 RRR. Limited exam due to body habitus  ABDOMEN: Soft, Nontender, Nondistended;   SKIN: No rashes or lesions  NERVOUS SYSTEM:  Alert & Oriented X3, no focal deficits. Easily distractible.    LABS:  09-01    141  |  103  |  14  ----------------------------<  140<H>  3.1<L>   |  24  |  0.60  08-30    139  |  100  |  31<H>  ----------------------------<  154<H>  3.4<L>   |  25  |  0.74    Ca    9.2      01 Sep 2022 09:16  Ca    9.7      30 Aug 2022 12:12  Phos  2.9     09-01  Mg     1.8     09-01    TPro  7.0  /  Alb  3.8  /  TBili  0.2  /  DBili  x   /  AST  18  /  ALT  14  /  AlkPhos  118  09-01                          8.9    9.12  )-----------( 243      ( 01 Sep 2022 09:13 )             30.4                         8.9    10.03 )-----------( 249      ( 30 Aug 2022 12:12 )             29.7     CAPILLARY BLOOD GLUCOSE    RADIOLOGY & ADDITIONAL TESTS:    Imaging Personally Reviewed:  [x ] YES  [ ] NO    Consultants involved in case:   Consultant(s) Notes Reviewed:  [ x] YES  [ ] NO:   Care Discussed with Consultants/Other Providers [x ] YES  [ ] NO

## 2022-09-02 NOTE — PROGRESS NOTE ADULT - SUBJECTIVE AND OBJECTIVE BOX
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;     INTERVAL HISTORY: oncology called to further evaluate the patient's likely metastatic breast cancer; started on a puree diet with moderately thickened fluids in addition to full PEG feeds; seems more confused; no shortness of breath or hypoxemia on room air; no cough, sputum production, hemoptysis, chest congestion or wheeze; no fevers, chills or sweats; no chest pain/pressure or palpitations; on cefazolin for "cellulitis"; being observed off antibiotics for asymptomatic enterococcus in urine;    REVIEW OF SYSTEMS:  Constitutional: As per interval history  HEENT: Within normal limits  CV: As per interval history  Resp: As per interval history  GI: dysphagia  : Within normal limits  Musculoskeletal: Within normal limits  Skin: Within normal limits  Neurological: CVA - left sided plegia  Psychiatric: Within normal limits  Endocrine: Within normal limits  Hematologic/Lymphatic: Within normal limits  Allergic/Immunologic: Within normal limits    MEDICATIONS:     Pulmonary "  albuterol/ipratropium for Nebulization 3 milliLiter(s) Nebulizer every 6 hours  sodium chloride 3%  Inhalation 4 milliLiter(s) Inhalation every 12 hours    Anti-microbials:  ceFAZolin   IVPB 1000 milliGRAM(s) IV Intermittent every 8 hours    Cardiovascular:    Other:  allopurinol 100 milliGRAM(s) Oral daily  atorvastatin 20 milliGRAM(s) Oral at bedtime  cyanocobalamin 1000 MICROGram(s) Oral daily  enoxaparin Injectable 40 milliGRAM(s) SubCutaneous every 24 hours  escitalopram 10 milliGRAM(s) Oral daily  HYDROmorphone  Injectable 0.25 milliGRAM(s) IV Push once  levETIRAcetam  Solution 750 milliGRAM(s) Oral two times a day  levothyroxine 75 MICROGram(s) Oral daily  psyllium Powder 1 Packet(s) Oral daily    MEDICATIONS  (PRN):  HYDROmorphone  Injectable 0.25 milliGRAM(s) IV Push every 8 hours PRN Severe Pain (7 - 10)  traMADol 50 milliGRAM(s) Oral two times a day PRN Severe Pain (7 - 10)    OBJECTIVE:    PHYSICAL EXAM:       ICU Vital Signs Last 24 Hrs  T(C): 36.7 (02 Sep 2022 09:57), Max: 36.9 (01 Sep 2022 13:34)  T(F): 98 (02 Sep 2022 09:57), Max: 98.5 (01 Sep 2022 13:34)  HR: 96 (02 Sep 2022 09:57) (88 - 100)  BP: 95/62 (02 Sep 2022 09:57) (95/62 - 148/68)  BP(mean): --  ABP: --  ABP(mean): --  RR: 19 (02 Sep 2022 09:57) (19 - 20)  SpO2: 95% (02 Sep 2022 09:57) (94% - 96%) on room air    General: Awake and alert. Cooperative. Confused. No distress Appears stated age. Bedbound  HEENT: Atraumatic. Normocephalic. Anicteric. Normal oral mucosa. PERRL. EOMI. Mallampati class IV airway.  Neck: Supple. Trachea midline. Thyroid without enlargement/tenderness/nodules. No carotid bruit. No JVD. Short and wide  Cardiovascular: Regular rate and rhythm. S1 S2 normal. III/VI systolic murmur  Respiratory: Respirations unlabored. Bilateral basilar rales. No wheeze. Kyphosis. Poor chest wall excursion  Abdomen: Soft. Non-tender. Non-distended. No organomegaly. No masses. Normal bowel sounds. Obese. PEG.  Extremities: Warm to touch. No clubbing or cyanosis. No pedal edema. Large legs. Left leg contracted under the right leg  Pulses: 2+ peripheral pulses all extremities.	  Skin: Normal skin color. No rashes or lesions. No ecchymoses. No cyanosis. Warm to touch.  Lymph Nodes: Cervical, supraclavicular and axillary nodes normal  Neurological: Left lower extremity plegia with contraction. Left upper extremity is quite weak. A and O x 3  Psychiatry: Appropriate mood and affect.    LABS:                          8.8    7.53  )-----------( 230      ( 02 Sep 2022 09:40 )             29.1     CBC    WBC  7.53 <==, 9.12 <==, 10.03 <==, 13.93 <==, 14.46 <==    Hemoglobin  8.8 <<==, 8.9 <<==, 8.9 <<==, 9.6 <<==, 10.1 <<==    Hematocrit  29.1 <==, 30.4 <==, 29.7 <==, 30.6 <==, 31.8 <==    Platelets  230 <==, 243 <==, 249 <==, 296 <==, 298 <==      140  |  103  |  15  ----------------------------<  185<H>    09-02  3.6   |  23  |  0.65      LYTES    sodium  140 <==, 141 <==, 139 <==, 132 <==, 130 <==, 132 <==, 131 <==    potassium   3.6 <==, 3.1 <==, 3.4 <==, 4.9 <==, 5.7 <==, 5.3 <==, 5.8 <==    chloride  103 <==, 103 <==, 100 <==, 94 <==, 93 <==, 94 <==, 94 <==    carbon dioxide  23 <==, 24 <==, 25 <==, 22 <==, 20 <==, 21 <==, 22 <==    =============================================================================================  RENAL FUNCTION:    Creatinine:   0.65  <<==, 0.60  <<==, 0.74  <<==, 0.72  <<==, 0.69  <<==, 0.78  <<==, 0.69  <<==    BUN:   15 <==, 14 <==, 31 <==, 44 <==, 44 <==, 47 <==, 44 <==    ============================================================================================    calcium   9.2 <==, 9.2 <==, 9.7 <==, 9.7 <==, 9.8 <==, 9.9 <==, 9.8 <==    phos   2.4 <==, 2.9 <==, 3.4 <==, 4.7 <==    mag   1.7 <==, 1.8 <==, 2.2 <==, 2.3 <==    ============================================================================================  LFTs    AST:   18 <== , 37 <== , 29 <==     ALT:  14  <== , 16  <== , 16  <==     AP:  118  <=, 137  <=, 137  <=    Bili:  0.2  <=, 0.2  <=, 0.2  <=    PT/INR - ( 30 Aug 2022 12:12 )   PT: 13.8 sec;   INR: 1.20 ratio      Venous Blood Gas:   @ 09:03  7.31/51/45/26/73.4  VBG Lactate: --    Venous Blood Gas:   @ 22:32  7.36/47/48/27/79.0  VBG Lactate: --    Venous Blood Gas:   @ 20:50  7.31/48/57/24/86.9  VBG Lactate: 2.5    PT/INR - ( 30 Aug 2022 12:12 )   PT: 13.8 sec;   INR: 1.20 ratio       PTT - ( 30 Aug 2022 12:12 )  PTT:32.1 sec    Serum Pro-Brain Natriuretic Peptide: 155 pg/mL ( @ 21:06)    CARDIAC MARKERS ( 27 Aug 2022 21:06 )  CPK x     /CKMB x     /CKMB Units x        troponin 21 ng/L    < from: TTE with Doppler (w/Cont) (22 @ 14:08) >    Patient name: FRANKI MEHTA  YOB: 1935   Age: 86 (F)   MR#: [id number]  Study Date: 2022  ------------------------------------------------------------------------  Dimensions:    Normal Values:  LA:     2.5    2.0 - 4.0 cm  Ao:     3.3    2.0 - 3.8 cm  SEPTUM: 1.6    0.6 - 1.2 cm  PWT:    0.9    0.6 - 1.1 cm  LVIDd:  3.6    3.0 - 5.6 cm  LVIDs:  2.5    1.8 - 4.0 cm  Derived variables:  LVMI: 77 g/m2  RWT: 0.50  Fractional short: 31 %  EF (Visual Estimate): 70-75 %  ------------------------------------------------------------------------  Conclusions:  1. Concentric left ventricular hypertrophy.  2. Hyperdynamic left ventricular systolic function.  Endocardial visualization enhanced with intravenous  injection of Ultrasonic Enhancing Agent (Definity).  3. The right ventricle is not well visualized; grossly  normal right ventricular systolic function.  Pt refused to proceed with exam.  Limited Doppler study.  No trans aortic gradients.  Unable to rule out endocarditis.  ------------------------------------------------------------------------  Confirmed on 2022 - 15:42:57 by Kole Mcfarland M.D.  FASE  ------------------------------------------------------------------------  ---------------------------------------------------------------------------------------------------------------  MICROBIOLOGY:     Respiratory Viral Panel with COVID-19 by RYAN (22 @ 21:05)   Rapid RVP Result: [facility]   SARS-CoV-2: [facility]  This Respiratory Panel uses polymerase chain reaction (PCR) to detect for   adenovirus; coronavirus (HKU1, NL63, 229E, OC43); human metapneumovirus   (hMPV); human enterovirus/rhinovirus (Entero/RV); influenza A; influenza   A/H1; influenza A/H3; influenza A/H1-2009; influenza B; parainfluenza   viruses 1, 2, 3, 4; respiratory syncytial virus; Mycoplasma pneumoniae;   Chlamydophila pneumoniae; and SARS-CoV-2.     Urinalysis Basic - ( 27 Aug 2022 22:42 )    Color: Light Orange / Appearance: Turbid / S.013 / pH: x  Gluc: x / Ketone: Negative  / Bili: Negative / Urobili: Negative   Blood: x / Protein: 30 mg/dL / Nitrite: Negative   Leuk Esterase: Large / RBC: 8 /hpf / WBC 1145 /HPF   Sq Epi: x / Non Sq Epi: 1 /hpf / Bacteria: Moderate    Culture - Urine (22 @ 22:42)   - Nitrofurantoin: I 64 Should not be used to treat pyelonephritis.   - Tetra/Doxy: R >8   - Vancomycin: S 1   - Ampicillin: R >8 Predicts results to ampicillin/sulbactam, amoxacillin-clavulanate and piperacillin-tazobactam.   - Ciprofloxacin: R >2   - Levofloxacin: R >4   Specimen Source: Clean Catch Clean Catch (Midstream)   Culture Results:   >100,000 CFU/ml Enterococcus faecium   <10,000 CFU/ml Normal Urogenital ck present     Historical Values  Culture - Urine (22 @ 22:42)   Specimen Source: Clean Catch Clean Catch (Midstream)   Culture Results:   >100,000 CFU/ml Enterococcus faecium   <10,000 CFU/ml Normal Urogenital ck present   Organism Identification: Enterococcus faecium     Culture - Blood (22 @ 20:30)   Specimen Source: .Blood Blood-Peripheral   Culture Results:   No growth to date.     Culture - Blood (22 @ 20:15)   Specimen Source: .Blood Blood-Peripheral   Culture Results:   No growth to date.     MRSA/MSSA PCR (22 @ 05:23)   MRSA PCR Result.: NotDetec  Staph aureus PCR Result: NotDetec     RADIOLOGY:  [x ] Chest radiographs reviewed and interpreted by me    EXAM:  CT CHEST                          PROCEDURE DATE:  2022      FINDINGS:    LYMPH NODES: No mediastinal lymphadenopathy.    HEART/VASCULATURE: The heart is normal in size. No pericardial effusion.   Aortic valve calcifications noted.    AIRWAYS/LUNGS/PLEURA: Patent central airways. Right lower lobe dependent   atelectasis appear similar to prior. Patchy groundglass opacities the   right apex are improved. Interval resolution of left lower lobe complete   collapse. There is mild dependent atelectasis of the left upper and lower   lobes.    UPPER ABDOMEN: Gallbladder stones.    BONES/SOFT TISSUES: Status post right mastectomy. Status post left   humerus ORIF. Marked kyphosis. Multiple lytic bone lesions predominantly   in the posterior and lateral thoracic vertebral bodies. It is unclear if   there is any spinal canal extension. Lytic lesions are alsoseen at the   right posterior seventh rib and right humeral head    OTHER: Right maxillary sinus is opacified.      IMPRESSION:  Bilateral dependent atelectasis. Interval resolution of left lower lobe   complete collapse.    Multiple lytic bone lesions, predominantly in the thoracic vertebral   bodies which is concerning for metastatic disease. Unclear if there is   any spinal canal extension. Recommend MRI of the thoracic spine for   further evaluation.    BAILEY STARR MD; Resident Radiologist  This document has been electronically signed.  HAY IRVIN MD; Attending Radiologist  This document has been electronically signed. Aug 29 2022 12:01PM  ---------------------------------------------------------------------------------------------------------------  EXAM:  CT ABDOMEN AND PELVIS IC                          PROCEDURE DATE:  2022      IMPRESSION:    Multiple lytic lesions are seen throughout the axial and appendicular   skeleton, some of which appear increased in size from CT abdomen pelvis   3/16/2022 when evaluated in retrospect. A few lytic lesions involve the   left intertrochanteric region and right acetabulum.    Endometrial thickening. Recommend dedicated pelvic sonogram.    Cystic lesions are seen within the pancreas. Recommend a dedicated MRI on   a nonemergent basis.    SULY KENDRICK MD; Resident Radiology  This document has been electronically signed.  BRITTANI MALCOLM MD; Attending Radiologist  This document has been electronically signed. Sep  1 2022 11:01AM  ---------------------------------------------------------------------------------------------------------------  EXAM:  XR HUMERUS MIN 2 VIEWS RT                          PROCEDURE DATE:  2022      EXAM:  Internal/external rotation AP right humerus from 2022 at 0927.   Compared to appearance on previous day chest CT.    IMPRESSION:  Generalized osteopenia. Redemonstrated focal lytic lesion in proximal   medial humeral diaphysis with small area of permeative cortical   destruction. No additional radiographically appreciated suspicious lytic   or blastic lesions in remaining imaged regions.    No current fractures or dislocations.    Preserved shoulder and elbow joint spaces.    IV cannula with attached tubing overlies upper arm.    MINE NICE MD; Attending Radiologist  This document has been electronically signed. Aug 30 2022 10:39AM  ---------------------------------------------------------------------------------------------------------------  EXAM:  DUPLEX EXT VEINS UPPER LT                          PROCEDURE DATE:  2022      IMPRESSION:  No evidence of left upper extremity deep venous thrombosis.    FAWN FITZGERALD MD; Attending Radiologist  This document has been electronically signed. Sep  1 2022  1:10PM  ---------------------------------------------------------------------------------------------------------------  EXAM:  DUPLEX SCAN EXT VEINS LOWER BI                          PROCEDURE DATE:  2022      IMPRESSION:  No evidence of deep venous thrombosis in either lower extremity.    KEYLA ROMERO MD; Attending Radiologist  This document has been electronically signed. Aug 31 2022  7:59PM  ---------------------------------------------------------------------------------------------------------------

## 2022-09-02 NOTE — PROGRESS NOTE ADULT - SUBJECTIVE AND OBJECTIVE BOX
CARDIOLOGY     PROGRESS  NOTE   ________________________________________________    CHIEF COMPLAINT:Patient is a 87y old  Female who presents with a chief complaint of SOB (02 Sep 2022 06:13)  no complain.  	  REVIEW OF SYSTEMS:  CONSTITUTIONAL: No fever, weight loss, or fatigue  EYES: No eye pain, visual disturbances, or discharge  ENT:  No difficulty hearing, tinnitus, vertigo; No sinus or throat pain  NECK: No pain or stiffness  RESPIRATORY: No cough, wheezing, chills or hemoptysis; No Shortness of Breath  CARDIOVASCULAR: No chest pain, palpitations, passing out, dizziness, or leg swelling  GASTROINTESTINAL: No abdominal or epigastric pain. No nausea, vomiting, or hematemesis; No diarrhea or constipation. No melena or hematochezia.  GENITOURINARY: No dysuria, frequency, hematuria, or incontinence  NEUROLOGICAL: No headaches, memory loss, loss of strength, numbness, or tremors  SKIN: No itching, burning, rashes, or lesions   LYMPH Nodes: No enlarged glands  ENDOCRINE: No heat or cold intolerance; No hair loss  MUSCULOSKELETAL: No joint pain or swelling; No muscle, back, or extremity pain  PSYCHIATRIC: No depression, anxiety, mood swings, or difficulty sleeping  HEME/LYMPH: No easy bruising, or bleeding gums  ALLERGY AND IMMUNOLOGIC: No hives or eczema	    [ ] All others negative	  [x ] Unable to obtain    PHYSICAL EXAM:  T(C): 36.7 (09-02-22 @ 05:04), Max: 36.9 (09-01-22 @ 13:34)  HR: 99 (09-02-22 @ 05:04) (88 - 100)  BP: 122/65 (09-02-22 @ 05:04) (106/71 - 148/68)  RR: 20 (09-02-22 @ 05:04) (20 - 20)  SpO2: 94% (09-02-22 @ 05:04) (94% - 96%)  Wt(kg): --  I&O's Summary    01 Sep 2022 07:01  -  02 Sep 2022 07:00  --------------------------------------------------------  IN: 1060 mL / OUT: 800 mL / NET: 260 mL        Appearance: Normal	  HEENT:   Normal oral mucosa, PERRL, EOMI	  Lymphatic: No lymphadenopathy  Cardiovascular: Normal S1 S2, No JVD, +murmurs, + edema  Respiratory: rhonchi	  Gastrointestinal:  Soft, Non-tender, + BS	  Skin: No rashes, No ecchymoses, No cyanosis	  Neurologic: Non-focal  Extremities: Normal range of motion, No clubbing, cyanosis , + edema  Vascular: Peripheral pulses palpable 2+ bilaterally    MEDICATIONS  (STANDING):  albuterol/ipratropium for Nebulization 3 milliLiter(s) Nebulizer every 6 hours  allopurinol 100 milliGRAM(s) Oral daily  atorvastatin 20 milliGRAM(s) Oral at bedtime  ceFAZolin   IVPB 1000 milliGRAM(s) IV Intermittent every 8 hours  cyanocobalamin 1000 MICROGram(s) Oral daily  dextrose 5% + lactated ringers. 1000 milliLiter(s) (80 mL/Hr) IV Continuous <Continuous>  enoxaparin Injectable 40 milliGRAM(s) SubCutaneous every 24 hours  escitalopram 10 milliGRAM(s) Oral daily  HYDROmorphone  Injectable 0.25 milliGRAM(s) IV Push once  levETIRAcetam  Solution 750 milliGRAM(s) Oral two times a day  levothyroxine 75 MICROGram(s) Oral daily  psyllium Powder 1 Packet(s) Oral daily  sodium chloride 3%  Inhalation 4 milliLiter(s) Inhalation every 12 hours      TELEMETRY: 	    ECG:  	  RADIOLOGY:  OTHER: 	  	  LABS:	 	    CARDIAC MARKERS:                                8.9    9.12  )-----------( 243      ( 01 Sep 2022 09:13 )             30.4     09-01    141  |  103  |  14  ----------------------------<  140<H>  3.1<L>   |  24  |  0.60    Ca    9.2      01 Sep 2022 09:16  Phos  2.9     09-01  Mg     1.8     09-01    TPro  7.0  /  Alb  3.8  /  TBili  0.2  /  DBili  x   /  AST  18  /  ALT  14  /  AlkPhos  118  09-01    proBNP: Serum Pro-Brain Natriuretic Peptide: 155 pg/mL (08-27 @ 21:06)    Lipid Profile:   HgA1c:   TSH:     < from: [facility] Duplex Lower Ext Vein Scan, Bilat (08.31.22 @ 18:32) >  RIGHT:  Limited compression of the distal femoral vein secondary to patient's   body habitus.  No evidence of clot in the RIGHT common femoral, femoral and popliteal   veins.  Doppler examination shows normal spontaneous and phasic flow.  No RIGHT calf vein thrombosis is detected.    LEFT:  Limited compression of the distal femoral vein secondary to patient's   body habitus.  No evidence of clot in the LEFT common femoral, femoral and popliteal   veins.  Doppler examination shows normal spontaneous and phasic flow.  No LEFT calf vein thrombosis is detected.    IMPRESSION:  No evidence of deep venous thrombosis in either lower extremity.      Assessment and plan  ---------------------------  Pt is an 85yo F w/ PMH of severe tracheobronchomalacia, HTN, HLD, CAD, CVA w/ residual L sided weakness, Intrinsic lung disease, Aortic stenosis, R breast CA s/p mastectomy, L lung collapse and MRSE p/w dyspnea from rehab.   Unable to obtain further history from patient d/t dyspnea and use of CPAP. Remained of history provided by chart review and collateral from pt's son.  She has a previous hx of resp failure d/t mucus plugging w/ complete L lung collapse and severe tracheobronchomalacia requiring hospitalization at [facility] in March 2022. After discharge to rehab pt was maintained on BiPAP for about one month's time during the evening hours but was subsequently taken off. She was reportedly doing well until until started complaining of an inability to breath and having increased WOB prompting her ED visit.   In the ED she was noted to be tachycardic and tachypneic w/ labs significant for a leukocytosis and positive UA. CXR w/ LLL opacity vs atelectasis. She was administered Vanc, Zosyn, Tylenol and Duoneb treatments and cultures were sent to r/o infectious etiology.  pt is well known to me with hx of htn, tracheomalacia with multiple admission with l lung collapse, htn, cad, chf diastolic dysfunction with perforated appendicitis.  sob ?to lung collapse increase o2 / neb  ct chest no contrast  continue po Cardizem  MODERATE AS, continue to follow, no need to repeat echo  pt with hx of ASHD/ MI, ?asa daily, pt with hx of GI bleed on the last admission  dvt prophylaxis  continue Bipap at night  borderline low bp if symptomatic will consider adding midodrine  check le venous doppler, negative  ct chest noted, r/o metastatic disease/ onc noted

## 2022-09-02 NOTE — PROGRESS NOTE ADULT - PROBLEM SELECTOR PLAN 4
Pt has been NPO with PEG tube feeds for past year. SLP consulted for possible pleasure feeds in setting of poor prognosis. PEG displaced 8/29 overnight, straight catheter inserted to maintain patency. PEG reinserted by IR 8/30, okay for use.    - resume PEG feeds  - discuss further w/ son Sy  - start puree diet with moderately thickened fluids for pleasure feeds; patient greatly appreciates.

## 2022-09-03 NOTE — PROGRESS NOTE ADULT - SUBJECTIVE AND OBJECTIVE BOX
CARDIOLOGY     PROGRESS  NOTE   ________________________________________________    CHIEF COMPLAINT:Patient is a 87y old  Female who presents with a chief complaint of SOB (03 Sep 2022 07:11)  no complain.  	  REVIEW OF SYSTEMS:  CONSTITUTIONAL: No fever, weight loss, or fatigue  EYES: No eye pain, visual disturbances, or discharge  ENT:  No difficulty hearing, tinnitus, vertigo; No sinus or throat pain  NECK: No pain or stiffness  RESPIRATORY: No cough, wheezing, chills or hemoptysis; No Shortness of Breath  CARDIOVASCULAR: No chest pain, palpitations, passing out, dizziness, or leg swelling  GASTROINTESTINAL: No abdominal or epigastric pain. No nausea, vomiting, or hematemesis; No diarrhea or constipation. No melena or hematochezia.  GENITOURINARY: No dysuria, frequency, hematuria, or incontinence  NEUROLOGICAL: No headaches, memory loss, loss of strength, numbness, or tremors  SKIN: No itching, burning, rashes, or lesions   LYMPH Nodes: No enlarged glands  ENDOCRINE: No heat or cold intolerance; No hair loss  MUSCULOSKELETAL: No joint pain or swelling; No muscle, back, or extremity pain  PSYCHIATRIC: No depression, anxiety, mood swings, or difficulty sleeping  HEME/LYMPH: No easy bruising, or bleeding gums  ALLERGY AND IMMUNOLOGIC: No hives or eczema	    [ ] All others negative	  [ x] Unable to obtain    PHYSICAL EXAM:  T(C): 36.5 (09-03-22 @ 08:53), Max: 36.9 (09-02-22 @ 18:42)  HR: 94 (09-03-22 @ 08:53) (87 - 97)  BP: 167/60 (09-03-22 @ 08:53) (125/60 - 167/60)  RR: 20 (09-03-22 @ 08:53) (18 - 20)  SpO2: 94% (09-03-22 @ 08:53) (94% - 98%)  Wt(kg): --  I&O's Summary    02 Sep 2022 07:01  -  03 Sep 2022 07:00  --------------------------------------------------------  IN: 0 mL / OUT: 1200 mL / NET: -1200 mL        Appearance: Normal	  HEENT:   Normal oral mucosa, PERRL, EOMI	  Lymphatic: No lymphadenopathy  Cardiovascular: Normal S1 S2, No JVD, + murmurs, + edema  Respiratory: rhonchi  Gastrointestinal:  Soft, Non-tender, + BS	  Skin: No rashes, No ecchymoses, No cyanosis	  Neurologic: Non-focal  Extremities: Normal range of motion, No clubbing, cyanosis + edema  Vascular: Peripheral pulses palpable 2+ bilaterally    MEDICATIONS  (STANDING):  albuterol/ipratropium for Nebulization 3 milliLiter(s) Nebulizer every 6 hours  allopurinol 100 milliGRAM(s) Oral daily  atorvastatin 20 milliGRAM(s) Oral at bedtime  cyanocobalamin 1000 MICROGram(s) Oral daily  enoxaparin Injectable 40 milliGRAM(s) SubCutaneous every 24 hours  escitalopram 10 milliGRAM(s) Oral daily  guaiFENesin Oral Liquid (Sugar-Free) 300 milliGRAM(s) Oral every 6 hours  HYDROmorphone  Injectable 0.25 milliGRAM(s) IV Push once  levETIRAcetam  Solution 750 milliGRAM(s) Oral two times a day  levothyroxine 75 MICROGram(s) Oral daily  psyllium Powder 1 Packet(s) Oral daily  sodium chloride 3%  Inhalation 4 milliLiter(s) Inhalation every 12 hours      TELEMETRY: 	    ECG:  	  RADIOLOGY:  OTHER: 	  	  LABS:	 	    CARDIAC MARKERS:                                8.8    7.53  )-----------( 230      ( 02 Sep 2022 09:40 )             29.1     09-02    140  |  103  |  15  ----------------------------<  185<H>  3.6   |  23  |  0.65    Ca    9.2      02 Sep 2022 09:40  Phos  2.4     09-02  Mg     1.7     09-02    TPro  6.3  /  Alb  x   /  TBili  x   /  DBili  x   /  AST  x   /  ALT  x   /  AlkPhos  x   09-02    proBNP: Serum Pro-Brain Natriuretic Peptide: 155 pg/mL (08-27 @ 21:06)    Lipid Profile:   HgA1c:   TSH:         Assessment and plan  ---------------------------  Pt is an 87yo F w/ PMH of severe tracheobronchomalacia, HTN, HLD, CAD, CVA w/ residual L sided weakness, Intrinsic lung disease, Aortic stenosis, R breast CA s/p mastectomy, L lung collapse and MRSE p/w dyspnea from rehab.   Unable to obtain further history from patient d/t dyspnea and use of CPAP. Remained of history provided by chart review and collateral from pt's son.  She has a previous hx of resp failure d/t mucus plugging w/ complete L lung collapse and severe tracheobronchomalacia requiring hospitalization at [facility] in March 2022. After discharge to rehab pt was maintained on BiPAP for about one month's time during the evening hours but was subsequently taken off. She was reportedly doing well until until started complaining of an inability to breath and having increased WOB prompting her ED visit.   In the ED she was noted to be tachycardic and tachypneic w/ labs significant for a leukocytosis and positive UA. CXR w/ LLL opacity vs atelectasis. She was administered Vanc, Zosyn, Tylenol and Duoneb treatments and cultures were sent to r/o infectious etiology.  pt is well known to me with hx of htn, tracheomalacia with multiple admission with l lung collapse, htn, cad, chf diastolic dysfunction with perforated appendicitis.  sob ?to lung collapse increase o2 / neb  ct chest no contrast  continue po Cardizem  MODERATE AS, continue to follow, no need to repeat echo  pt with hx of ASHD/ MI, ?asa daily, pt with hx of GI bleed on the last admission  dvt prophylaxis  continue Bipap at night  check le venous doppler, negative  ct chest noted, r/o metastatic disease/ onc noted discussed with son  monitor bp

## 2022-09-03 NOTE — PROGRESS NOTE ADULT - PROBLEM SELECTOR PLAN 2
Pt had R breast CA s/p mastectomy 4/2019, staged [id number] ER/TX+ and HER-2 negative, without adjuvant hormonal therapy. Prior CT (2021) showed possible lytic lesion, not worked up.  CT Chest (8/29) b/l atelectasis, multiple lytic bone lesions concerning for metastatic disease  CTAP (8/31) multiple lytic lesions increased in size, thickened endometrium.  Paraproteinemia (9/2) pending  DVT scans negative    - heme following; recs appreciated  - pending MRI -- cancelled?  - GoC w/ pt and children Pt had R breast CA s/p mastectomy 4/2019, staged [id number] ER/PA+ and HER-2 negative, without adjuvant hormonal therapy. Prior CT (2021) showed possible lytic lesion, not worked up.  CT Chest (8/29) b/l atelectasis, multiple lytic bone lesions concerning for metastatic disease  CTAP (8/31) multiple lytic lesions increased in size, thickened endometrium.  Paraproteinemia (9/2) pending  DVT scans negative    - heme following; recs appreciated  - further cancer workup as outpatient  - GoC w/ pt and children

## 2022-09-03 NOTE — PROGRESS NOTE ADULT - ASSESSMENT
Pt is an 85yo F w/ PMH of severe tracheobronchomalacia, HTN, HLD, CAD, CVA w/ residual L sided weakness, Intrinsic lung disease, Aortic stenosis, R breast CA s/p mastectomy, L lung collapse and MRSE p/w dyspnea from rehab likely i/s/o PNA vs atelectasis d/t mucus plugging all i/s/o tracheobronchomalacia. Pt is an 85yo F w/ PMH of severe tracheobronchomalacia, HTN, HLD, CAD, CVA w/ residual L sided weakness, Intrinsic lung disease, aortic stenosis, R breast CA s/p mastectomy, L lung collapse and MRSE p/w dyspnea from rehab likely i/s/o PNA vs atelectasis d/t mucus plugging all i/s/o tracheobronchomalacia.

## 2022-09-03 NOTE — PROGRESS NOTE ADULT - PROBLEM SELECTOR PLAN 10
DVT ppx: Lovenox  Diet: NPO while on BiPAP  Dispo: [facility] vs hospice; cannot do over long weekend, requires auth. Likely Tues/Wed

## 2022-09-03 NOTE — PROGRESS NOTE ADULT - SUBJECTIVE AND OBJECTIVE BOX
***************************************************************  Jaylen Gastelum, PGY1  Internal Medicine   pager:  [facility]: [phone] [facility]: [phone]  ***************************************************************    FRANKI MEHTA  87y  MRN: [id number]    Patient is a 87y old  Female who presents with a chief complaint of SOB (02 Sep 2022 08:02)      Interval/Overnight Events: no events ON.     Subjective: Pt seen and examined at bedside. Denies fever, CP, SOB, abn pain, N/V, dysuria. Tolerating diet.      MEDICATIONS  (STANDING):  albuterol/ipratropium for Nebulization 3 milliLiter(s) Nebulizer every 6 hours  allopurinol 100 milliGRAM(s) Oral daily  atorvastatin 20 milliGRAM(s) Oral at bedtime  cyanocobalamin 1000 MICROGram(s) Oral daily  enoxaparin Injectable 40 milliGRAM(s) SubCutaneous every 24 hours  escitalopram 10 milliGRAM(s) Oral daily  guaiFENesin Oral Liquid (Sugar-Free) 300 milliGRAM(s) Oral every 6 hours  HYDROmorphone  Injectable 0.25 milliGRAM(s) IV Push once  levETIRAcetam  Solution 750 milliGRAM(s) Oral two times a day  levothyroxine 75 MICROGram(s) Oral daily  psyllium Powder 1 Packet(s) Oral daily  sodium chloride 3%  Inhalation 4 milliLiter(s) Inhalation every 12 hours    MEDICATIONS  (PRN):  HYDROmorphone  Injectable 0.25 milliGRAM(s) IV Push every 8 hours PRN Severe Pain (7 - 10)  traMADol 50 milliGRAM(s) Oral two times a day PRN Severe Pain (7 - 10)      Objective:    Vitals: Vital Signs Last 24 Hrs  T(C): 36.7 (09-03-22 @ 04:13), Max: 36.9 (09-02-22 @ 18:42)  T(F): 98 (09-03-22 @ 04:13), Max: 98.5 (09-02-22 @ 18:42)  HR: 96 (09-03-22 @ 04:13) (87 - 97)  BP: 156/79 (09-03-22 @ 04:13) (95/62 - 156/79)  BP(mean): --  RR: 20 (09-03-22 @ 04:13) (18 - 20)  SpO2: 94% (09-03-22 @ 04:13) (94% - 98%)                I&O's Summary    02 Sep 2022 07:01  -  03 Sep 2022 07:00  --------------------------------------------------------  IN: 0 mL / OUT: 1200 mL / NET: -1200 mL        PHYSICAL EXAM:  GENERAL: NAD, obese  HEAD:  Atraumatic, Normocephalic  EYES: EOMI, conjunctiva and sclera clear  CHEST/LUNG: +wheezes b/l. limited anterior exam due to body habitus  HEART: S1 S2 RRR. Limited exam due to body habitus  ABDOMEN: Soft, Nontender, Nondistended;   SKIN: No rashes or lesions  NERVOUS SYSTEM:  Alert & Oriented X3, no focal deficits. Easily distractible.      LABS:  09-02    140  |  103  |  15  ----------------------------<  185<H>  3.6   |  23  |  0.65  09-01    141  |  103  |  14  ----------------------------<  140<H>  3.1<L>   |  24  |  0.60    Ca    9.2      02 Sep 2022 09:40  Ca    9.2      01 Sep 2022 09:16  Phos  2.4     09-02  Mg     1.7     09-02    TPro  6.3  /  Alb  x   /  TBili  x   /  DBili  x   /  AST  x   /  ALT  x   /  AlkPhos  x   09-02  TPro  7.0  /  Alb  3.8  /  TBili  0.2  /  DBili  x   /  AST  18  /  ALT  14  /  AlkPhos  118  09-01                        8.8    7.53  )-----------( 230      ( 02 Sep 2022 09:40 )             29.1                         8.9    9.12  )-----------( 243      ( 01 Sep 2022 09:13 )             30.4     CAPILLARY BLOOD GLUCOSE    RADIOLOGY & ADDITIONAL TESTS:    Imaging Personally Reviewed:  [x ] YES  [ ] NO    Consultants involved in case:   Consultant(s) Notes Reviewed:  [ x] YES  [ ] NO:   Care Discussed with Consultants/Other Providers [x ] YES  [ ] NO         ***************************************************************  Jaylen Gastelum, PGY1  Internal Medicine   pager:  [facility]: [phone] [facility]: [phone]  ***************************************************************    FRANKI MEHTA  87y  MRN: [id number]    Patient is a 87y old  Female who presents with a chief complaint of SOB (02 Sep 2022 08:02)    Interval/Overnight Events: no events ON.     Subjective: Pt seen and examined at bedside. Denies fever, CP, SOB, abn pain, dysuria. Tolerating diet.  c/o nausea w/o vomit; iv zofran given.    MEDICATIONS  (STANDING):  albuterol/ipratropium for Nebulization 3 milliLiter(s) Nebulizer every 6 hours  allopurinol 100 milliGRAM(s) Oral daily  atorvastatin 20 milliGRAM(s) Oral at bedtime  cyanocobalamin 1000 MICROGram(s) Oral daily  enoxaparin Injectable 40 milliGRAM(s) SubCutaneous every 24 hours  escitalopram 10 milliGRAM(s) Oral daily  guaiFENesin Oral Liquid (Sugar-Free) 300 milliGRAM(s) Oral every 6 hours  HYDROmorphone  Injectable 0.25 milliGRAM(s) IV Push once  levETIRAcetam  Solution 750 milliGRAM(s) Oral two times a day  levothyroxine 75 MICROGram(s) Oral daily  psyllium Powder 1 Packet(s) Oral daily  sodium chloride 3%  Inhalation 4 milliLiter(s) Inhalation every 12 hours    MEDICATIONS  (PRN):  HYDROmorphone  Injectable 0.25 milliGRAM(s) IV Push every 8 hours PRN Severe Pain (7 - 10)  traMADol 50 milliGRAM(s) Oral two times a day PRN Severe Pain (7 - 10)    Objective:    Vitals: Vital Signs Last 24 Hrs  T(C): 36.7 (09-03-22 @ 04:13), Max: 36.9 (09-02-22 @ 18:42)  T(F): 98 (09-03-22 @ 04:13), Max: 98.5 (09-02-22 @ 18:42)  HR: 96 (09-03-22 @ 04:13) (87 - 97)  BP: 156/79 (09-03-22 @ 04:13) (95/62 - 156/79)  BP(mean): --  RR: 20 (09-03-22 @ 04:13) (18 - 20)  SpO2: 94% (09-03-22 @ 04:13) (94% - 98%)                I&O's Summary    02 Sep 2022 07:01  -  03 Sep 2022 07:00  --------------------------------------------------------  IN: 0 mL / OUT: 1200 mL / NET: -1200 mL      PHYSICAL EXAM:  GENERAL: NAD, obese  HEAD:  Atraumatic, Normocephalic  EYES: EOMI, conjunctiva and sclera clear  CHEST/LUNG: +wheezes b/l. limited anterior exam due to body habitus  HEART: S1 S2 RRR. Limited exam due to body habitus  ABDOMEN: Soft, Nontender, Nondistended;   SKIN: No rashes or lesions  NERVOUS SYSTEM:  Alert & Oriented X3, no focal deficits. Easily distractible.    LABS:  09-02    140  |  103  |  15  ----------------------------<  185<H>  3.6   |  23  |  0.65  09-01    141  |  103  |  14  ----------------------------<  140<H>  3.1<L>   |  24  |  0.60    Ca    9.2      02 Sep 2022 09:40  Ca    9.2      01 Sep 2022 09:16  Phos  2.4     09-02  Mg     1.7     09-02    TPro  6.3  /  Alb  x   /  TBili  x   /  DBili  x   /  AST  x   /  ALT  x   /  AlkPhos  x   09-02  TPro  7.0  /  Alb  3.8  /  TBili  0.2  /  DBili  x   /  AST  18  /  ALT  14  /  AlkPhos  118  09-01                        8.8    7.53  )-----------( 230      ( 02 Sep 2022 09:40 )             29.1                         8.9    9.12  )-----------( 243      ( 01 Sep 2022 09:13 )             30.4     CAPILLARY BLOOD GLUCOSE    RADIOLOGY & ADDITIONAL TESTS:    Imaging Personally Reviewed:  [x ] YES  [ ] NO    Consultants involved in case:   Consultant(s) Notes Reviewed:  [ x] YES  [ ] NO:   Care Discussed with Consultants/Other Providers [x ] YES  [ ] NO

## 2022-09-03 NOTE — PROGRESS NOTE ADULT - NSPROGADDITIONALINFOA_GEN_ALL_CORE
Agree with above, discharge to [facility] on Monday or Tuesday when bed arranged. ABX all done, no further testing needed.

## 2022-09-04 NOTE — PROGRESS NOTE ADULT - PROBLEM SELECTOR PLAN 2
Pt had R breast CA s/p mastectomy 4/2019, staged [id number] ER/UT+ and HER-2 negative, without adjuvant hormonal therapy. Prior CT (2021) showed possible lytic lesion, not worked up.  CT Chest (8/29) b/l atelectasis, multiple lytic bone lesions concerning for metastatic disease  CTAP (8/31) multiple lytic lesions increased in size, thickened endometrium.  Paraproteinemia (9/2) pending  DVT scans negative    - heme following; recs appreciated  - further cancer workup as outpatient  - [facility] w/ pt and children

## 2022-09-04 NOTE — PROGRESS NOTE ADULT - SUBJECTIVE AND OBJECTIVE BOX
PROGRESS NOTE:   Authored by Dr. Tanja Biswas MD (PGY-2). Pager [facility] [phone]/ [facility]     Patient is a 87y old  Female who presents with a chief complaint of SOB (03 Sep 2022 11:56)      SUBJECTIVE / OVERNIGHT EVENTS:  No acute events overnight.       MEDICATIONS  (STANDING):  albuterol/ipratropium for Nebulization 3 milliLiter(s) Nebulizer every 6 hours  allopurinol 100 milliGRAM(s) Oral daily  atorvastatin 20 milliGRAM(s) Oral at bedtime  cyanocobalamin 1000 MICROGram(s) Oral daily  enoxaparin Injectable 40 milliGRAM(s) SubCutaneous every 24 hours  escitalopram 10 milliGRAM(s) Oral daily  guaiFENesin Oral Liquid (Sugar-Free) 300 milliGRAM(s) Oral every 6 hours  HYDROmorphone  Injectable 0.25 milliGRAM(s) IV Push once  levETIRAcetam  Solution 750 milliGRAM(s) Oral two times a day  levothyroxine 75 MICROGram(s) Oral daily  psyllium Powder 1 Packet(s) Oral daily  sodium chloride 3%  Inhalation 4 milliLiter(s) Inhalation every 12 hours    MEDICATIONS  (PRN):  HYDROmorphone  Injectable 0.25 milliGRAM(s) IV Push every 8 hours PRN Severe Pain (7 - 10)  ondansetron Injectable 4 milliGRAM(s) IV Push every 6 hours PRN Nausea and/or Vomiting  traMADol 50 milliGRAM(s) Oral two times a day PRN Severe Pain (7 - 10)      CAPILLARY BLOOD GLUCOSE        I&O's Summary    03 Sep 2022 07:01  -  04 Sep 2022 07:00  --------------------------------------------------------  IN: 0 mL / OUT: 750 mL / NET: -750 mL        PHYSICAL EXAM:  Vital Signs Last 24 Hrs  T(C): 36.6 (04 Sep 2022 04:07), Max: 36.6 (03 Sep 2022 17:24)  T(F): 97.9 (04 Sep 2022 04:07), Max: 97.9 (04 Sep 2022 04:07)  HR: 100 (04 Sep 2022 04:07) (94 - 100)  BP: 116/65 (04 Sep 2022 04:07) (116/65 - 167/60)  BP(mean): --  RR: 18 (04 Sep 2022 04:07) (18 - 21)  SpO2: 92% (04 Sep 2022 04:07) (92% - 96%)    Parameters below as of 04 Sep 2022 04:07  Patient On (Oxygen Delivery Method): room air    GENERAL: NAD, obese  HEAD:  Atraumatic, Normocephalic  EYES: EOMI, conjunctiva and sclera clear  CHEST/LUNG: +wheezes b/l. limited anterior exam due to body habitus  HEART: S1 S2 RRR. Limited exam due to body habitus  ABDOMEN: Soft, Nontender, Nondistended;   SKIN: No rashes or lesions  NERVOUS SYSTEM:  Alert & Oriented X3, no focal deficits. Easily distractible.      LABS:                        8.8    7.53  )-----------( 230      ( 02 Sep 2022 09:40 )             29.1     09-02    140  |  103  |  15  ----------------------------<  185<H>  3.6   |  23  |  0.65    Ca    9.2      02 Sep 2022 09:40  Phos  2.4     09-02  Mg     1.7     09-02    TPro  6.3  /  Alb  x   /  TBili  x   /  DBili  x   /  AST  x   /  ALT  x   /  AlkPhos  x   09-02                Tele Reviewed:    RADIOLOGY & ADDITIONAL TESTS:  Results Reviewed:   Imaging Personally Reviewed:  Electrocardiogram Personally Reviewed:     PROGRESS NOTE:   Authored by Dr. Tanja Biswas MD (PGY-2). Pager [facility] [phone]/ [facility]     Patient is a 87y old  Female who presents with a chief complaint of SOB (03 Sep 2022 11:56)      SUBJECTIVE / OVERNIGHT EVENTS:  No acute events overnight. On room air    pt complaining of neck pain, requesting tylenol      MEDICATIONS  (STANDING):  albuterol/ipratropium for Nebulization 3 milliLiter(s) Nebulizer every 6 hours  allopurinol 100 milliGRAM(s) Oral daily  atorvastatin 20 milliGRAM(s) Oral at bedtime  cyanocobalamin 1000 MICROGram(s) Oral daily  enoxaparin Injectable 40 milliGRAM(s) SubCutaneous every 24 hours  escitalopram 10 milliGRAM(s) Oral daily  guaiFENesin Oral Liquid (Sugar-Free) 300 milliGRAM(s) Oral every 6 hours  HYDROmorphone  Injectable 0.25 milliGRAM(s) IV Push once  levETIRAcetam  Solution 750 milliGRAM(s) Oral two times a day  levothyroxine 75 MICROGram(s) Oral daily  psyllium Powder 1 Packet(s) Oral daily  sodium chloride 3%  Inhalation 4 milliLiter(s) Inhalation every 12 hours    MEDICATIONS  (PRN):  HYDROmorphone  Injectable 0.25 milliGRAM(s) IV Push every 8 hours PRN Severe Pain (7 - 10)  ondansetron Injectable 4 milliGRAM(s) IV Push every 6 hours PRN Nausea and/or Vomiting  traMADol 50 milliGRAM(s) Oral two times a day PRN Severe Pain (7 - 10)      CAPILLARY BLOOD GLUCOSE        I&O's Summary    03 Sep 2022 07:01  -  04 Sep 2022 07:00  --------------------------------------------------------  IN: 0 mL / OUT: 750 mL / NET: -750 mL        PHYSICAL EXAM:  Vital Signs Last 24 Hrs  T(C): 36.6 (04 Sep 2022 04:07), Max: 36.6 (03 Sep 2022 17:24)  T(F): 97.9 (04 Sep 2022 04:07), Max: 97.9 (04 Sep 2022 04:07)  HR: 100 (04 Sep 2022 04:07) (94 - 100)  BP: 116/65 (04 Sep 2022 04:07) (116/65 - 167/60)  BP(mean): --  RR: 18 (04 Sep 2022 04:07) (18 - 21)  SpO2: 92% (04 Sep 2022 04:07) (92% - 96%)    Parameters below as of 04 Sep 2022 04:07  Patient On (Oxygen Delivery Method): room air    GENERAL: NAD, obese  HEAD:  Atraumatic, Normocephalic  EYES: EOMI, conjunctiva and sclera clear  CHEST/LUNG: +wheezes b/l. limited anterior exam due to body habitus  HEART: S1 S2 RRR. Limited exam due to body habitus  ABDOMEN: Soft, Nontender, Nondistended;   SKIN: No rashes or lesions  NERVOUS SYSTEM:  Alert & Oriented X3, no focal deficits. Easily distractible.      LABS:                        8.8    7.53  )-----------( 230      ( 02 Sep 2022 09:40 )             29.1     09-02    140  |  103  |  15  ----------------------------<  185<H>  3.6   |  23  |  0.65    Ca    9.2      02 Sep 2022 09:40  Phos  2.4     09-02  Mg     1.7     09-02    TPro  6.3  /  Alb  x   /  TBili  x   /  DBili  x   /  AST  x   /  ALT  x   /  AlkPhos  x   09-02                Tele Reviewed:    RADIOLOGY & ADDITIONAL TESTS:  Results Reviewed:   Imaging Personally Reviewed:  Electrocardiogram Personally Reviewed:

## 2022-09-04 NOTE — PROGRESS NOTE ADULT - SUBJECTIVE AND OBJECTIVE BOX
CARDIOLOGY     PROGRESS  NOTE   ________________________________________________    CHIEF COMPLAINT:Patient is a 87y old  Female who presents with a chief complaint of SOB (04 Sep 2022 07:19)  doing better.  	  REVIEW OF SYSTEMS:  CONSTITUTIONAL: No fever, weight loss, or fatigue  EYES: No eye pain, visual disturbances, or discharge  ENT:  No difficulty hearing, tinnitus, vertigo; No sinus or throat pain  NECK: No pain or stiffness  RESPIRATORY: No cough, wheezing, chills or hemoptysis; No Shortness of Breath  CARDIOVASCULAR: No chest pain, palpitations, passing out, dizziness, or leg swelling  GASTROINTESTINAL: No abdominal or epigastric pain. No nausea, vomiting, or hematemesis; No diarrhea or constipation. No melena or hematochezia.  GENITOURINARY: No dysuria, frequency, hematuria, or incontinence  NEUROLOGICAL: No headaches, memory loss, loss of strength, numbness, or tremors  SKIN: No itching, burning, rashes, or lesions   LYMPH Nodes: No enlarged glands  ENDOCRINE: No heat or cold intolerance; No hair loss  MUSCULOSKELETAL: No joint pain or swelling; No muscle, back, or extremity pain  PSYCHIATRIC: No depression, anxiety, mood swings, or difficulty sleeping  HEME/LYMPH: No easy bruising, or bleeding gums  ALLERGY AND IMMUNOLOGIC: No hives or eczema	    [ ] All others negative	  [ ] Unable to obtain    PHYSICAL EXAM:  T(C): 36.7 (09-04-22 @ 09:09), Max: 36.7 (09-04-22 @ 09:09)  HR: 97 (09-04-22 @ 09:09) (95 - 100)  BP: 123/49 (09-04-22 @ 09:09) (116/65 - 140/73)  RR: 18 (09-04-22 @ 09:09) (18 - 21)  SpO2: 94% (09-04-22 @ 09:09) (92% - 96%)  Wt(kg): --  I&O's Summary    03 Sep 2022 07:01  -  04 Sep 2022 07:00  --------------------------------------------------------  IN: 0 mL / OUT: 750 mL / NET: -750 mL        Appearance: Normal	  HEENT:   Normal oral mucosa, PERRL, EOMI	  Lymphatic: No lymphadenopathy  Cardiovascular: Normal S1 S2, No JVD, + murmurs, + edema  Respiratory: rhonchi  Gastrointestinal:  Soft, Non-tender, + BS	  Skin: No rashes, No ecchymoses, No cyanosis	  Neurologic: Non-focal  Extremities: Normal range of motion, No clubbing, cyanosis , + edema  Vascular: Peripheral pulses palpable 2+ bilaterally    MEDICATIONS  (STANDING):  albuterol/ipratropium for Nebulization 3 milliLiter(s) Nebulizer every 6 hours  allopurinol 100 milliGRAM(s) Oral daily  atorvastatin 20 milliGRAM(s) Oral at bedtime  cyanocobalamin 1000 MICROGram(s) Oral daily  enoxaparin Injectable 40 milliGRAM(s) SubCutaneous every 24 hours  escitalopram 10 milliGRAM(s) Oral daily  guaiFENesin Oral Liquid (Sugar-Free) 300 milliGRAM(s) Oral every 6 hours  HYDROmorphone  Injectable 0.25 milliGRAM(s) IV Push once  levETIRAcetam  Solution 750 milliGRAM(s) Oral two times a day  levothyroxine 75 MICROGram(s) Oral daily  psyllium Powder 1 Packet(s) Oral daily  sodium chloride 3%  Inhalation 4 milliLiter(s) Inhalation every 12 hours      TELEMETRY: 	    ECG:  	  RADIOLOGY:  OTHER: 	  	  LABS:	 	    CARDIAC MARKERS:      proBNP: Serum Pro-Brain Natriuretic Peptide: 155 pg/mL (08-27 @ 21:06)    Lipid Profile:   HgA1c:   TSH:         Assessment and plan  ---------------------------  Pt is an 87yo F w/ PMH of severe tracheobronchomalacia, HTN, HLD, CAD, CVA w/ residual L sided weakness, Intrinsic lung disease, Aortic stenosis, R breast CA s/p mastectomy, L lung collapse and MRSE p/w dyspnea from rehab.   Unable to obtain further history from patient d/t dyspnea and use of CPAP. Remained of history provided by chart review and collateral from pt's son.  She has a previous hx of resp failure d/t mucus plugging w/ complete L lung collapse and severe tracheobronchomalacia requiring hospitalization at [facility] in March 2022. After discharge to rehab pt was maintained on BiPAP for about one month's time during the evening hours but was subsequently taken off. She was reportedly doing well until until started complaining of an inability to breath and having increased WOB prompting her ED visit.   In the ED she was noted to be tachycardic and tachypneic w/ labs significant for a leukocytosis and positive UA. CXR w/ LLL opacity vs atelectasis. She was administered Vanc, Zosyn, Tylenol and Duoneb treatments and cultures were sent to r/o infectious etiology.  pt is well known to me with hx of htn, tracheomalacia with multiple admission with l lung collapse, htn, cad, chf diastolic dysfunction with perforated appendicitis.  sob ?to lung collapse increase o2 / neb  ct chest no contrast  continue po Cardizem  MODERATE AS, continue to follow, no need to repeat echo  pt with hx of ASHD/ MI, ?asa daily, pt with hx of GI bleed on the last admission  dvt prophylaxis  continue Bipap at night  check le venous doppler, negative  ct chest noted, r/o metastatic disease/ onc noted discussed with son  monitor bp  dvt prophylaxis

## 2022-09-04 NOTE — PROGRESS NOTE ADULT - PROBLEM SELECTOR PROBLEM 5
HTN (hypertension) Counseling Text: I reviewed the side effect in detail. Patient should get monthly blood tests, not donate blood, not drive at night if vision affected, and not share medication.

## 2022-09-05 NOTE — PROGRESS NOTE ADULT - SUBJECTIVE AND OBJECTIVE BOX
Patient is a 87y old  Female who presents with a chief complaint of SOB (04 Sep 2022 11:31)      SUBJECTIVE / OVERNIGHT EVENTS:    MEDICATIONS  (STANDING):  albuterol/ipratropium for Nebulization 3 milliLiter(s) Nebulizer every 6 hours  allopurinol 100 milliGRAM(s) Oral daily  atorvastatin 20 milliGRAM(s) Oral at bedtime  cyanocobalamin 1000 MICROGram(s) Oral daily  enoxaparin Injectable 40 milliGRAM(s) SubCutaneous every 24 hours  escitalopram 10 milliGRAM(s) Oral daily  guaiFENesin Oral Liquid (Sugar-Free) 300 milliGRAM(s) Oral every 6 hours  HYDROmorphone  Injectable 0.25 milliGRAM(s) IV Push once  levETIRAcetam  Solution 750 milliGRAM(s) Oral two times a day  levothyroxine 75 MICROGram(s) Oral daily  psyllium Powder 1 Packet(s) Oral daily  sodium chloride 3%  Inhalation 4 milliLiter(s) Inhalation every 12 hours    MEDICATIONS  (PRN):  HYDROmorphone  Injectable 0.25 milliGRAM(s) IV Push every 8 hours PRN Severe Pain (7 - 10)  ondansetron Injectable 4 milliGRAM(s) IV Push every 6 hours PRN Nausea and/or Vomiting  traMADol 50 milliGRAM(s) Oral two times a day PRN Severe Pain (7 - 10)      CAPILLARY BLOOD GLUCOSE        I&O's Summary    03 Sep 2022 07:01  -  04 Sep 2022 07:00  --------------------------------------------------------  IN: 0 mL / OUT: 750 mL / NET: -750 mL        Vital Signs Last 24 Hrs  T(C): 36.4 (05 Sep 2022 05:45), Max: 37 (04 Sep 2022 22:04)  T(F): 97.6 (05 Sep 2022 05:45), Max: 98.6 (04 Sep 2022 22:04)  HR: 89 (05 Sep 2022 05:45) (84 - 97)  BP: 137/55 (05 Sep 2022 05:45) (100/72 - 137/55)  BP(mean): --  RR: 18 (05 Sep 2022 05:45) (18 - 18)  SpO2: 99% (05 Sep 2022 05:45) (94% - 99%)    Parameters below as of 05 Sep 2022 05:45  Patient On (Oxygen Delivery Method): nasal cannula        PHYSICAL EXAM:  GENERAL: NAD, well-developed, well-nourished  HEAD: Atraumatic, Normocephalic  EYES: EOMI, PERRLA, conjunctiva and sclera clear  NECK: Supple, No JVD  CHEST/LUNG: Clear to auscultation bilaterally; No wheezes or crackles  HEART: Normal S1/S2; Regular rate and rhythm; No murmurs, rubs, or gallops  ABDOMEN: Soft, Nontender, Nondistended; Bowel sounds present  EXTREMITIES: 2+ Peripheral Pulses; No clubbing, cyanosis, or edema  PSYCH: A&Ox3  NEUROLOGY: no focal neurologic deficit  SKIN: No rashes or lesions    LABS:                     RADIOLOGY & ADDITIONAL TESTS:    Imaging Personally Reviewed:    Consultant(s) Notes Reviewed:      Care Discussed with Consultants/Other Providers:   Patient is a 87y old  Female who presents with a chief complaint of SOB (04 Sep 2022 11:31)      SUBJECTIVE / OVERNIGHT EVENTS:    no o/n events.  Patient complaining of pain on L leg which was bent underneath her.  Cried out in pain when tried to move it.  Asked for tylenol.  MEDICATIONS  (STANDING):  albuterol/ipratropium for Nebulization 3 milliLiter(s) Nebulizer every 6 hours  allopurinol 100 milliGRAM(s) Oral daily  atorvastatin 20 milliGRAM(s) Oral at bedtime  cyanocobalamin 1000 MICROGram(s) Oral daily  enoxaparin Injectable 40 milliGRAM(s) SubCutaneous every 24 hours  escitalopram 10 milliGRAM(s) Oral daily  guaiFENesin Oral Liquid (Sugar-Free) 300 milliGRAM(s) Oral every 6 hours  HYDROmorphone  Injectable 0.25 milliGRAM(s) IV Push once  levETIRAcetam  Solution 750 milliGRAM(s) Oral two times a day  levothyroxine 75 MICROGram(s) Oral daily  psyllium Powder 1 Packet(s) Oral daily  sodium chloride 3%  Inhalation 4 milliLiter(s) Inhalation every 12 hours    MEDICATIONS  (PRN):  HYDROmorphone  Injectable 0.25 milliGRAM(s) IV Push every 8 hours PRN Severe Pain (7 - 10)  ondansetron Injectable 4 milliGRAM(s) IV Push every 6 hours PRN Nausea and/or Vomiting  traMADol 50 milliGRAM(s) Oral two times a day PRN Severe Pain (7 - 10)      CAPILLARY BLOOD GLUCOSE        I&O's Summary    03 Sep 2022 07:01  -  04 Sep 2022 07:00  --------------------------------------------------------  IN: 0 mL / OUT: 750 mL / NET: -750 mL        Vital Signs Last 24 Hrs  T(C): 36.4 (05 Sep 2022 05:45), Max: 37 (04 Sep 2022 22:04)  T(F): 97.6 (05 Sep 2022 05:45), Max: 98.6 (04 Sep 2022 22:04)  HR: 89 (05 Sep 2022 05:45) (84 - 97)  BP: 137/55 (05 Sep 2022 05:45) (100/72 - 137/55)  BP(mean): --  RR: 18 (05 Sep 2022 05:45) (18 - 18)  SpO2: 99% (05 Sep 2022 05:45) (94% - 99%)    Parameters below as of 05 Sep 2022 05:45  Patient On (Oxygen Delivery Method): nasal cannula        PHYSICAL EXAM:  GENERAL: very raspy voice, complaining of L leg pain  HEAD: Atraumatic, Normocephalic  EYES: EOMI, PERRLA, conjunctiva and sclera clear  NECK: Supple, No JVD  CHEST/LUNG: diffusely rhoncherous  HEART: Normal S1/S2; Regular rate and rhythm; systolic murmur  ABDOMEN: Soft, Nontender, Nondistended; Bowel sounds present  EXTREMITIES: 2+ Peripheral Pulses; 3+ edema, sensitive L leg  PSYCH: A&Ox1  NEUROLOGY: not following directions  SKIN: No rashes or lesions    LABS:                     RADIOLOGY & ADDITIONAL TESTS:    Imaging Personally Reviewed:    Consultant(s) Notes Reviewed:      Care Discussed with Consultants/Other Providers:

## 2022-09-05 NOTE — PROGRESS NOTE ADULT - NSPROGADDITIONALINFOA_GEN_ALL_CORE
Plan: Discharge to LTC at [facility] tomorrow.     No additional tests needed.  Completed all ABX.

## 2022-09-05 NOTE — PROGRESS NOTE ADULT - SUBJECTIVE AND OBJECTIVE BOX
CARDIOLOGY     PROGRESS  NOTE   ________________________________________________    CHIEF COMPLAINT:Patient is a 87y old  Female who presents with a chief complaint of SOB (05 Sep 2022 06:19)  no complain.  	  REVIEW OF SYSTEMS:  CONSTITUTIONAL: No fever, weight loss, or fatigue  EYES: No eye pain, visual disturbances, or discharge  ENT:  No difficulty hearing, tinnitus, vertigo; No sinus or throat pain  NECK: No pain or stiffness  RESPIRATORY: No cough, wheezing, chills or hemoptysis; No Shortness of Breath  CARDIOVASCULAR: No chest pain, palpitations, passing out, dizziness, or leg swelling  GASTROINTESTINAL: No abdominal or epigastric pain. No nausea, vomiting, or hematemesis; No diarrhea or constipation. No melena or hematochezia.  GENITOURINARY: No dysuria, frequency, hematuria, or incontinence  NEUROLOGICAL: No headaches, memory loss, loss of strength, numbness, or tremors  SKIN: No itching, burning, rashes, or lesions   LYMPH Nodes: No enlarged glands  ENDOCRINE: No heat or cold intolerance; No hair loss  MUSCULOSKELETAL: No joint pain or swelling; No muscle, back, or extremity pain  PSYCHIATRIC: No depression, anxiety, mood swings, or difficulty sleeping  HEME/LYMPH: No easy bruising, or bleeding gums  ALLERGY AND IMMUNOLOGIC: No hives or eczema	    [ ] All others negative	  [x ] Unable to obtain    PHYSICAL EXAM:  T(C): 36.4 (09-05-22 @ 05:45), Max: 37 (09-04-22 @ 22:04)  HR: 89 (09-05-22 @ 05:45) (84 - 95)  BP: 137/55 (09-05-22 @ 05:45) (100/72 - 137/55)  RR: 18 (09-05-22 @ 05:45) (18 - 18)  SpO2: 99% (09-05-22 @ 05:45) (95% - 99%)  Wt(kg): --  I&O's Summary    04 Sep 2022 07:01  -  05 Sep 2022 07:00  --------------------------------------------------------  IN: 0 mL / OUT: 500 mL / NET: -500 mL        Appearance: Normal	  HEENT:   Normal oral mucosa, PERRL, EOMI	  Lymphatic: No lymphadenopathy  Cardiovascular: Normal S1 S2, No JVD, +murmurs, + edema  Respiratory: rhonchi  Gastrointestinal:  Soft, Non-tender, + BS	  Skin: No rashes, No ecchymoses, No cyanosis	  Neurologic: Non-focal  Extremities: Normal range of motion, No clubbing, cyanosis + edema  Vascular: Peripheral pulses palpable 2+ bilaterally    MEDICATIONS  (STANDING):  albuterol/ipratropium for Nebulization 3 milliLiter(s) Nebulizer every 6 hours  allopurinol 100 milliGRAM(s) Oral daily  atorvastatin 20 milliGRAM(s) Oral at bedtime  cyanocobalamin 1000 MICROGram(s) Oral daily  enoxaparin Injectable 40 milliGRAM(s) SubCutaneous every 24 hours  escitalopram 10 milliGRAM(s) Oral daily  guaiFENesin Oral Liquid (Sugar-Free) 300 milliGRAM(s) Oral every 6 hours  HYDROmorphone  Injectable 0.25 milliGRAM(s) IV Push once  levETIRAcetam  Solution 750 milliGRAM(s) Oral two times a day  levothyroxine 75 MICROGram(s) Oral daily  psyllium Powder 1 Packet(s) Oral daily  sodium chloride 3%  Inhalation 4 milliLiter(s) Inhalation every 12 hours      TELEMETRY: 	    ECG:  	  RADIOLOGY:  OTHER: 	  	  LABS:	 	    CARDIAC MARKERS:                  proBNP: Serum Pro-Brain Natriuretic Peptide: 155 pg/mL (08-27 @ 21:06)    Lipid Profile:   HgA1c:   TSH:         Assessment and plan  ---------------------------  Pt is an 85yo F w/ PMH of severe tracheobronchomalacia, HTN, HLD, CAD, CVA w/ residual L sided weakness, Intrinsic lung disease, Aortic stenosis, R breast CA s/p mastectomy, L lung collapse and MRSE p/w dyspnea from rehab.   Unable to obtain further history from patient d/t dyspnea and use of CPAP. Remained of history provided by chart review and collateral from pt's son.  She has a previous hx of resp failure d/t mucus plugging w/ complete L lung collapse and severe tracheobronchomalacia requiring hospitalization at [facility] in March 2022. After discharge to rehab pt was maintained on BiPAP for about one month's time during the evening hours but was subsequently taken off. She was reportedly doing well until until started complaining of an inability to breath and having increased WOB prompting her ED visit.   In the ED she was noted to be tachycardic and tachypneic w/ labs significant for a leukocytosis and positive UA. CXR w/ LLL opacity vs atelectasis. She was administered Vanc, Zosyn, Tylenol and Duoneb treatments and cultures were sent to r/o infectious etiology.  pt is well known to me with hx of htn, tracheomalacia with multiple admission with l lung collapse, htn, cad, chf diastolic dysfunction with perforated appendicitis.  sob ?to lung collapse increase o2 / neb  ct chest no contrast  continue po Cardizem  MODERATE AS, continue to follow, no need to repeat echo  pt with hx of ASHD/ MI, ?asa daily, pt with hx of GI bleed on the last admission  dvt prophylaxis  continue Bipap at night  check le venous doppler, negative  ct chest noted, r/o metastatic disease/ onc noted discussed with son  monitor bp  dvt prophylaxis

## 2022-09-05 NOTE — PROGRESS NOTE ADULT - PROBLEM SELECTOR PLAN 2
Pt had R breast CA s/p mastectomy 4/2019, staged [id number] ER/OK+ and HER-2 negative, without adjuvant hormonal therapy. Prior CT (2021) showed possible lytic lesion, not worked up.  CT Chest (8/29) b/l atelectasis, multiple lytic bone lesions concerning for metastatic disease  CTAP (8/31) multiple lytic lesions increased in size, thickened endometrium.  Paraproteinemia (9/2) pending  DVT scans negative    - heme following; recs appreciated  - further cancer workup as outpatient  - GoC w/ pt and children

## 2022-09-06 NOTE — PROGRESS NOTE ADULT - SUBJECTIVE AND OBJECTIVE BOX
Patient is a 87y old  Female who presents with a chief complaint of SOB (06 Sep 2022 11:00)      SUBJECTIVE / OVERNIGHT EVENTS:  no o/n events.  Patient complaining of pain under her arms, no rash/wound observed.    MEDICATIONS  (STANDING):  albuterol/ipratropium for Nebulization 3 milliLiter(s) Nebulizer every 6 hours  allopurinol 100 milliGRAM(s) Oral daily  atorvastatin 20 milliGRAM(s) Oral at bedtime  cyanocobalamin 1000 MICROGram(s) Oral daily  enoxaparin Injectable 40 milliGRAM(s) SubCutaneous every 24 hours  escitalopram 10 milliGRAM(s) Oral daily  guaiFENesin Oral Liquid (Sugar-Free) 300 milliGRAM(s) Oral every 6 hours  HYDROmorphone  Injectable 0.25 milliGRAM(s) IV Push once  levETIRAcetam  Solution 750 milliGRAM(s) Oral two times a day  levothyroxine 75 MICROGram(s) Oral daily  psyllium Powder 1 Packet(s) Oral daily  sodium chloride 3%  Inhalation 4 milliLiter(s) Inhalation every 12 hours    MEDICATIONS  (PRN):  ondansetron Injectable 4 milliGRAM(s) IV Push every 6 hours PRN Nausea and/or Vomiting  traMADol 50 milliGRAM(s) Oral two times a day PRN Severe Pain (7 - 10)      CAPILLARY BLOOD GLUCOSE        I&O's Summary    05 Sep 2022 07:01  -  06 Sep 2022 07:00  --------------------------------------------------------  IN: 1360 mL / OUT: 1900 mL / NET: -540 mL    06 Sep 2022 07:01  -  06 Sep 2022 11:42  --------------------------------------------------------  IN: 0 mL / OUT: 600 mL / NET: -600 mL        Vital Signs Last 24 Hrs  T(C): 36.9 (06 Sep 2022 09:14), Max: 36.9 (06 Sep 2022 09:14)  T(F): 98.5 (06 Sep 2022 09:14), Max: 98.5 (06 Sep 2022 09:14)  HR: 93 (06 Sep 2022 09:14) (91 - 93)  BP: 137/51 (06 Sep 2022 09:14) (120/84 - 137/51)  BP(mean): --  RR: 20 (06 Sep 2022 09:14) (18 - 20)  SpO2: 99% (06 Sep 2022 09:14) (97% - 99%)    Parameters below as of 06 Sep 2022 09:14  Patient On (Oxygen Delivery Method): nasal cannula        PHYSICAL EXAM:  GENERAL: very raspy voice, complaining of L leg pain  HEAD: Atraumatic, Normocephalic  EYES: EOMI, PERRLA, conjunctiva and sclera clear  NECK: Supple, No JVD  CHEST/LUNG: diffusely rhoncherous  HEART: Normal S1/S2; Regular rate and rhythm; systolic murmur  ABDOMEN: Soft, Nontender, Nondistended; Bowel sounds present  EXTREMITIES: 2+ Peripheral Pulses; 3+ edema, sensitive L leg  PSYCH: A&Ox1  NEUROLOGY: not following directions  SKIN: No rashes or lesions    LABS:                        8.2    6.00  )-----------( 227      ( 05 Sep 2022 13:21 )             27.9      09-05    136  |  98  |  20  ----------------------------<  115<H>  5.1   |  29  |  0.54    Ca    9.4      05 Sep 2022 13:21  Phos  3.5     09-05  Mg     1.8     09-05                RADIOLOGY & ADDITIONAL TESTS:    Imaging Personally Reviewed:    Consultant(s) Notes Reviewed:      Care Discussed with Consultants/Other Providers:

## 2022-09-06 NOTE — PROGRESS NOTE ADULT - PROBLEM SELECTOR PLAN 2
Pt had R breast CA s/p mastectomy 4/2019, staged [id number] ER/NH+ and HER-2 negative, without adjuvant hormonal therapy. Prior CT (2021) showed possible lytic lesion, not worked up.  CT Chest (8/29) b/l atelectasis, multiple lytic bone lesions concerning for metastatic disease  CTAP (8/31) multiple lytic lesions increased in size, thickened endometrium.  Paraproteinemia (9/2) pending  DVT scans negative    - heme following; recs appreciated  - further cancer workup as outpatient  - GoC w/ pt and children

## 2022-09-06 NOTE — PROGRESS NOTE ADULT - NSPROGADDITIONALINFOA_GEN_ALL_CORE
Spoke to Oncology. Would like MRI of thoracic spine with contrast to r/o spinal mets. Spoke to son who agrees.

## 2022-09-06 NOTE — PROGRESS NOTE ADULT - SUBJECTIVE AND OBJECTIVE BOX
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;     INTERVAL HISTORY: continues on a puree diet with moderately thickened fluids in addition to full PEG feeds; mental status seems at baseline; no shortness of breath or hypoxemia on room air although making a grunting noise; occasional cough productive of scant sputum; no hemoptysis, chest congestion or wheeze; no fevers, chills or sweats; no chest pain/pressure or palpitations; being observed off antibiotics; pain under her arms    REVIEW OF SYSTEMS:  Constitutional: As per interval history  HEENT: Within normal limits  CV: As per interval history  Resp: As per interval history  GI: dysphagia  : Within normal limits  Musculoskeletal: Within normal limits  Skin: Within normal limits  Neurological: CVA - left sided plegia  Psychiatric: Within normal limits  Endocrine: Within normal limits  Hematologic/Lymphatic: Within normal limits  Allergic/Immunologic: Within normal limits      MEDICATIONS:     Pulmonary "  albuterol/ipratropium for Nebulization 3 milliLiter(s) Nebulizer every 6 hours  guaiFENesin Oral Liquid (Sugar-Free) 300 milliGRAM(s) Oral every 6 hours  sodium chloride 3%  Inhalation 4 milliLiter(s) Inhalation every 12 hours    Anti-microbials:    Cardiovascular:    Other:  allopurinol 100 milliGRAM(s) Oral daily  atorvastatin 20 milliGRAM(s) Oral at bedtime  cyanocobalamin 1000 MICROGram(s) Oral daily  enoxaparin Injectable 40 milliGRAM(s) SubCutaneous every 24 hours  escitalopram 10 milliGRAM(s) Oral daily  HYDROmorphone  Injectable 0.25 milliGRAM(s) IV Push once  levETIRAcetam  Solution 750 milliGRAM(s) Oral two times a day  levothyroxine 75 MICROGram(s) Oral daily  psyllium Powder 1 Packet(s) Oral daily    MEDICATIONS  (PRN):  ondansetron Injectable 4 milliGRAM(s) IV Push every 6 hours PRN Nausea and/or Vomiting  traMADol 50 milliGRAM(s) Oral two times a day PRN Severe Pain (7 - 10)        OBJECTIVE:  PHYSICAL EXAM:       ICU Vital Signs Last 24 Hrs  T(C): 36.9 (06 Sep 2022 09:14), Max: 36.9 (06 Sep 2022 09:14)  T(F): 98.5 (06 Sep 2022 09:14), Max: 98.5 (06 Sep 2022 09:14)  HR: 93 (06 Sep 2022 09:14) (91 - 93)  BP: 137/51 (06 Sep 2022 09:14) (120/84 - 137/51)  BP(mean): --  ABP: --  ABP(mean): --  RR: 20 (06 Sep 2022 09:14) (18 - 20)  SpO2: 99% (06 Sep 2022 09:14) (97% - 99%) on room air    General: Awake and alert. Cooperative. No distress Appears stated age. Bedbound  HEENT: Atraumatic. Normocephalic. Anicteric. Normal oral mucosa. PERRL. EOMI. Mallampati class IV airway.  Neck: Supple. Trachea midline. Thyroid without enlargement/tenderness/nodules. No carotid bruit. No JVD. Short and wide.  Cardiovascular: Regular rate and rhythm. S1 S2 normal. III/VI systolic murmur  Respiratory: Respirations unlabored. Bilateral basilar rales. No wheeze. Kyphosis. Poor chest wall excursion  Abdomen: Soft. Non-tender. Non-distended. No organomegaly. No masses. Normal bowel sounds. Obese. PEG.  Extremities: Warm to touch. No clubbing or cyanosis. No pedal edema. Large legs. Left leg contracted under the right leg  Pulses: 2+ peripheral pulses all extremities.	  Skin: Normal skin color. No rashes or lesions. No ecchymoses. No cyanosis. Warm to touch.  Lymph Nodes: Cervical, supraclavicular and axillary nodes normal  Neurological: Left lower extremity plegia with contraction. Left upper extremity is quite weak. A and O x 3  Psychiatry: Appropriate mood and affect.    LABS:                          8.2    6.00  )-----------( 227      ( 05 Sep 2022 13:21 )             27.9     CBC    WBC  6.00 <==, 7.53 <==, 9.12 <==    Hemoglobin  8.2 <<==, 8.8 <<==, 8.9 <<==    Hematocrit  27.9 <==, 29.1 <==, 30.4 <==    Platelets  227 <==, 230 <==, 243 <==      136  |  98  |  20  ----------------------------<  115<H>    09-05  5.1   |  29  |  0.54      LYTES    sodium  136 <==, 140 <==, 141 <==    potassium   5.1 <==, 3.6 <==, 3.1 <==    chloride  98 <==, 103 <==, 103 <==    carbon dioxide  29 <==, 23 <==, 24 <==    =============================================================================================  RENAL FUNCTION:    Creatinine:   0.54  <<==, 0.65  <<==, 0.60  <<==    BUN:   20 <==, 15 <==, 14 <==    ============================================================================================    calcium   9.4 <==, 9.2 <==, 9.2 <==    phos   3.5 <==, 2.4 <==, 2.9 <==    mag   1.8 <==, 1.7 <==, 1.8 <==    ============================================================================================  LFTs    AST:   18 <==     ALT:  14  <==     AP:  118  <=    Bili:  0.2  <=    PT/INR - ( 30 Aug 2022 12:12 )   PT: 13.8 sec;   INR: 1.20 ratio      Venous Blood Gas:   @ 09:03  7.31/51/45/26/73.4  VBG Lactate: --    Venous Blood Gas:   @ 22:32  7.36/47/48/27/79.0  VBG Lactate: --    Venous Blood Gas:   @ 20:50  7.31/48/57/24/86.9  VBG Lactate: 2.5    PT/INR - ( 30 Aug 2022 12:12 )   PT: 13.8 sec;   INR: 1.20 ratio       PTT - ( 30 Aug 2022 12:12 )  PTT:32.1 sec    Serum Pro-Brain Natriuretic Peptide: 155 pg/mL ( @ 21:06)    CARDIAC MARKERS ( 27 Aug 2022 21:06 )  CPK x     /CKMB x     /CKMB Units x        troponin 21 ng/L    < from: TTE with Doppler (w/Cont) (22 @ 14:08) >    Patient name: FRANKI MEHTA  YOB: 1935   Age: 86 (F)   MR#: [id number]  Study Date: 2022  ------------------------------------------------------------------------  Dimensions:    Normal Values:  LA:     2.5    2.0 - 4.0 cm  Ao:     3.3    2.0 - 3.8 cm  SEPTUM: 1.6    0.6 - 1.2 cm  PWT:    0.9    0.6 - 1.1 cm  LVIDd:  3.6    3.0 - 5.6 cm  LVIDs:  2.5    1.8 - 4.0 cm  Derived variables:  LVMI: 77 g/m2  RWT: 0.50  Fractional short: 31 %  EF (Visual Estimate): 70-75 %  ------------------------------------------------------------------------  Conclusions:  1. Concentric left ventricular hypertrophy.  2. Hyperdynamic left ventricular systolic function.  Endocardial visualization enhanced with intravenous  injection of Ultrasonic Enhancing Agent (Definity).  3. The right ventricle is not well visualized; grossly  normal right ventricular systolic function.  Pt refused to proceed with exam.  Limited Doppler study.  No trans aortic gradients.  Unable to rule out endocarditis.  ------------------------------------------------------------------------  Confirmed on 2022 - 15:42:57 by COMPA Castillo  ------------------------------------------------------------------------  ---------------------------------------------------------------------------------------------------------------  MICROBIOLOGY:     Respiratory Viral Panel with COVID-19 by RYAN (22 @ 21:05)   Rapid RVP Result: [facility]   SARS-CoV-2: [facility]  This Respiratory Panel uses polymerase chain reaction (PCR) to detect for   adenovirus; coronavirus (HKU1, NL63, 229E, OC43); human metapneumovirus   (hMPV); human enterovirus/rhinovirus (Entero/RV); influenza A; influenza   A/H1; influenza A/H3; influenza A/H1-2009; influenza B; parainfluenza   viruses 1, 2, 3, 4; respiratory syncytial virus; Mycoplasma pneumoniae;   Chlamydophila pneumoniae; and SARS-CoV-2.     Urinalysis Basic - ( 27 Aug 2022 22:42 )    Color: Light Orange / Appearance: Turbid / S.013 / pH: x  Gluc: x / Ketone: Negative  / Bili: Negative / Urobili: Negative   Blood: x / Protein: 30 mg/dL / Nitrite: Negative   Leuk Esterase: Large / RBC: 8 /hpf / WBC 1145 /HPF   Sq Epi: x / Non Sq Epi: 1 /hpf / Bacteria: Moderate    Culture - Urine (22 @ 22:42)   - Nitrofurantoin: I 64 Should not be used to treat pyelonephritis.   - Tetra/Doxy: R >8   - Vancomycin: S 1   - Ampicillin: R >8 Predicts results to ampicillin/sulbactam, amoxacillin-clavulanate and piperacillin-tazobactam.   - Ciprofloxacin: R >2   - Levofloxacin: R >4   Specimen Source: Clean Catch Clean Catch (Midstream)   Culture Results:   >100,000 CFU/ml Enterococcus faecium   <10,000 CFU/ml Normal Urogenital ck present     Historical Values  Culture - Urine (22 @ 22:42)   Specimen Source: Clean Catch Clean Catch (Midstream)   Culture Results:   >100,000 CFU/ml Enterococcus faecium   <10,000 CFU/ml Normal Urogenital ck present   Organism Identification: Enterococcus faecium     Culture - Blood (22 @ 20:30)   Specimen Source: .Blood Blood-Peripheral   Culture Results:   No growth to date.     Culture - Blood (22 @ 20:15)   Specimen Source: .Blood Blood-Peripheral   Culture Results:   No growth to date.     MRSA/MSSA PCR (22 @ 05:23)   MRSA PCR Result.: NotDetec  Staph aureus PCR Result: NotDetec     RADIOLOGY:  [x ] Chest radiographs reviewed and interpreted by me    EXAM:  CT CHEST                          PROCEDURE DATE:  2022      FINDINGS:    LYMPH NODES: No mediastinal lymphadenopathy.    HEART/VASCULATURE: The heart is normal in size. No pericardial effusion.   Aortic valve calcifications noted.    AIRWAYS/LUNGS/PLEURA: Patent central airways. Right lower lobe dependent   atelectasis appear similar to prior. Patchy groundglass opacities the   right apex are improved. Interval resolution of left lower lobe complete   collapse. There is mild dependent atelectasis of the left upper and lower   lobes.    UPPER ABDOMEN: Gallbladder stones.    BONES/SOFT TISSUES: Status post right mastectomy. Status post left   humerus ORIF. Marked kyphosis. Multiple lytic bone lesions predominantly   in the posterior and lateral thoracic vertebral bodies. It is unclear if   there is any spinal canal extension. Lytic lesions are alsoseen at the   right posterior seventh rib and right humeral head    OTHER: Right maxillary sinus is opacified.      IMPRESSION:  Bilateral dependent atelectasis. Interval resolution of left lower lobe   complete collapse.    Multiple lytic bone lesions, predominantly in the thoracic vertebral   bodies which is concerning for metastatic disease. Unclear if there is   any spinal canal extension. Recommend MRI of the thoracic spine for   further evaluation.    BAILEY STARR MD; Resident Radiologist  This document has been electronically signed.  HAY IRVIN MD; Attending Radiologist  This document has been electronically signed. Aug 29 2022 12:01PM  ---------------------------------------------------------------------------------------------------------------  EXAM:  CT ABDOMEN AND PELVIS IC                          PROCEDURE DATE:  2022      IMPRESSION:    Multiple lytic lesions are seen throughout the axial and appendicular   skeleton, some of which appear increased in size from CT abdomen pelvis   3/16/2022 when evaluated in retrospect. A few lytic lesions involve the   left intertrochanteric region and right acetabulum.    Endometrial thickening. Recommend dedicated pelvic sonogram.    Cystic lesions are seen within the pancreas. Recommend a dedicated MRI on   a nonemergent basis.    SULY KENDRICK MD; Resident Radiology  This document has been electronically signed.  BRITTANI MALCOLM MD; Attending Radiologist  This document has been electronically signed. Sep  1 2022 11:01AM  ---------------------------------------------------------------------------------------------------------------  EXAM:  XR HUMERUS MIN 2 VIEWS RT                          PROCEDURE DATE:  2022      EXAM:  Internal/external rotation AP right humerus from 2022 at 0927.   Compared to appearance on previous day chest CT.    IMPRESSION:  Generalized osteopenia. Redemonstrated focal lytic lesion in proximal   medial humeral diaphysis with small area of permeative cortical   destruction. No additional radiographically appreciated suspicious lytic   or blastic lesions in remaining imaged regions.    No current fractures or dislocations.    Preserved shoulder and elbow joint spaces.    IV cannula with attached tubing overlies upper arm.    MINE NICE MD; Attending Radiologist  This document has been electronically signed. Aug 30 2022 10:39AM  ---------------------------------------------------------------------------------------------------------------  EXAM:  DUPLEX EXT VEINS UPPER LT                          PROCEDURE DATE:  2022      IMPRESSION:  No evidence of left upper extremity deep venous thrombosis.    FAWN FITZGERALD MD; Attending Radiologist  This document has been electronically signed. Sep  1 2022  1:10PM  ---------------------------------------------------------------------------------------------------------------  EXAM:  DUPLEX SCAN EXT VEINS LOWER BI                          PROCEDURE DATE:  2022      IMPRESSION:  No evidence of deep venous thrombosis in either lower extremity.    KEYLA ROMERO MD; Attending Radiologist  This document has been electronically signed. Aug 31 2022  7:59PM  ----------------------------------------------------------------------------------------

## 2022-09-06 NOTE — PROGRESS NOTE ADULT - SUBJECTIVE AND OBJECTIVE BOX
CARDIOLOGY     PROGRESS  NOTE   ________________________________________________    CHIEF COMPLAINT:Patient is a 87y old  Female who presents with a chief complaint of SOB (05 Sep 2022 10:30)  no complain.  	  REVIEW OF SYSTEMS:  CONSTITUTIONAL: No fever, weight loss, or fatigue  EYES: No eye pain, visual disturbances, or discharge  ENT:  No difficulty hearing, tinnitus, vertigo; No sinus or throat pain  NECK: No pain or stiffness  RESPIRATORY: No cough, wheezing, chills or hemoptysis; No Shortness of Breath  CARDIOVASCULAR: No chest pain, palpitations, passing out, dizziness, or leg swelling  GASTROINTESTINAL: No abdominal or epigastric pain. No nausea, vomiting, or hematemesis; No diarrhea or constipation. No melena or hematochezia.  GENITOURINARY: No dysuria, frequency, hematuria, or incontinence  NEUROLOGICAL: No headaches, memory loss, loss of strength, numbness, or tremors  SKIN: No itching, burning, rashes, or lesions   LYMPH Nodes: No enlarged glands  ENDOCRINE: No heat or cold intolerance; No hair loss  MUSCULOSKELETAL: No joint pain or swelling; No muscle, back, or extremity pain  PSYCHIATRIC: No depression, anxiety, mood swings, or difficulty sleeping  HEME/LYMPH: No easy bruising, or bleeding gums  ALLERGY AND IMMUNOLOGIC: No hives or eczema	    [ ] All others negative	  [ ] Unable to obtain    PHYSICAL EXAM:  T(C): 36.5 (09-06-22 @ 05:24), Max: 36.5 (09-06-22 @ 05:24)  HR: 91 (09-06-22 @ 05:24) (91 - 93)  BP: 120/84 (09-06-22 @ 05:24) (120/84 - 131/81)  RR: 18 (09-06-22 @ 05:24) (18 - 18)  SpO2: 97% (09-06-22 @ 05:24) (97% - 99%)  Wt(kg): --  I&O's Summary    05 Sep 2022 07:01  -  06 Sep 2022 07:00  --------------------------------------------------------  IN: 1360 mL / OUT: 1900 mL / NET: -540 mL        Appearance: Normal	  HEENT:   Normal oral mucosa, PERRL, EOMI	  Lymphatic: No lymphadenopathy  Cardiovascular: Normal S1 S2, No JVD, + murmurs, + edema  Respiratory: Lungs clear to auscultation	  Psychiatry: A & O x 3, Mood & affect appropriate  Gastrointestinal:  Soft, Non-tender, + BS	  Skin: No rashes, No ecchymoses, No cyanosis	  Neurologic: Non-focal  Extremities: Normal range of motion, No clubbing, cyanosis , +edema  Vascular: Peripheral pulses palpable 2+ bilaterally    MEDICATIONS  (STANDING):  albuterol/ipratropium for Nebulization 3 milliLiter(s) Nebulizer every 6 hours  allopurinol 100 milliGRAM(s) Oral daily  atorvastatin 20 milliGRAM(s) Oral at bedtime  cyanocobalamin 1000 MICROGram(s) Oral daily  enoxaparin Injectable 40 milliGRAM(s) SubCutaneous every 24 hours  escitalopram 10 milliGRAM(s) Oral daily  guaiFENesin Oral Liquid (Sugar-Free) 300 milliGRAM(s) Oral every 6 hours  HYDROmorphone  Injectable 0.25 milliGRAM(s) IV Push once  levETIRAcetam  Solution 750 milliGRAM(s) Oral two times a day  levothyroxine 75 MICROGram(s) Oral daily  psyllium Powder 1 Packet(s) Oral daily  sodium chloride 3%  Inhalation 4 milliLiter(s) Inhalation every 12 hours      TELEMETRY: 	    ECG:  	  RADIOLOGY:  OTHER: 	  	  LABS:	 	    CARDIAC MARKERS:                                8.2    6.00  )-----------( 227      ( 05 Sep 2022 13:21 )             27.9     09-05    136  |  98  |  20  ----------------------------<  115<H>  5.1   |  29  |  0.54    Ca    9.4      05 Sep 2022 13:21  Phos  3.5     09-05  Mg     1.8     09-05      proBNP: Serum Pro-Brain Natriuretic Peptide: 155 pg/mL (08-27 @ 21:06)    Lipid Profile:   HgA1c:   TSH:     < from: CT Chest No Cont (08.29.22 @ 09:06) >  Bilateral dependent atelectasis. Interval resolution of left lower lobe   complete collapse.    Multiple lytic bone lesions, predominantly in the thoracic vertebral   bodies which is concerning for metastatic disease. Unclear if there is   any spinal canal extension. Recommend MRI of the thoracic spine for   further evaluation.        Assessment and plan  ---------------------------  Pt is an 87yo F w/ PMH of severe tracheobronchomalacia, HTN, HLD, CAD, CVA w/ residual L sided weakness, Intrinsic lung disease, Aortic stenosis, R breast CA s/p mastectomy, L lung collapse and MRSE p/w dyspnea from rehab.   Unable to obtain further history from patient d/t dyspnea and use of CPAP. Remained of history provided by chart review and collateral from pt's son.  She has a previous hx of resp failure d/t mucus plugging w/ complete L lung collapse and severe tracheobronchomalacia requiring hospitalization at [facility] in March 2022. After discharge to rehab pt was maintained on BiPAP for about one month's time during the evening hours but was subsequently taken off. She was reportedly doing well until until started complaining of an inability to breath and having increased WOB prompting her ED visit.   In the ED she was noted to be tachycardic and tachypneic w/ labs significant for a leukocytosis and positive UA. CXR w/ LLL opacity vs atelectasis. She was administered Vanc, Zosyn, Tylenol and Duoneb treatments and cultures were sent to r/o infectious etiology.  pt is well known to me with hx of htn, tracheomalacia with multiple admission with l lung collapse, htn, cad, chf diastolic dysfunction with perforated appendicitis.  sob ?to lung collapse increase o2 / neb  ct chest no contrast  continue po Cardizem  MODERATE AS, continue to follow, no need to repeat echo  pt with hx of ASHD/ MI, ?asa daily, pt with hx of GI bleed on the last admission  dvt prophylaxis  continue Bipap at night  check le venous doppler, negative  ct chest noted, + metastatic disease/ onc noted discussed with son  monitor bp  dvt prophylaxis

## 2022-09-06 NOTE — PROGRESS NOTE ADULT - ASSESSMENT
Pt is an 85yo F w/ PMH of severe tracheobronchomalacia, HTN, HLD, CAD, CVA w/ residual L sided weakness, Intrinsic lung disease, aortic stenosis, R breast CA s/p mastectomy, L lung collapse and MRSE p/w dyspnea from rehab likely i/s/o PNA vs atelectasis d/t mucus plugging all i/s/o tracheobronchomalacia.  Stable.  Patient to be d/c'ed 9/6 to [facility].

## 2022-09-06 NOTE — PROGRESS NOTE ADULT - SUBJECTIVE AND OBJECTIVE BOX
Pt is an 87yo F w/ PMH of severe tracheobronchomalacia, HTN, HLD, CAD, CVA w/ residual L sided weakness, Intrinsic lung disease, Aortic stenosis, R breast CA s/p mastectomy, L lung collapse and MRSE p/w dyspnea from rehab.   She has a previous hx of resp failure d/t mucus plugging w/ complete L lung collapse and severe tracheobronchomalacia requiring hospitalization at [facility] in 2022. After discharge to rehab pt was maintained on BiPAP for about one month's time during the evening hours but was subsequently taken off. She was reportedly doing well until until started complaining of an inability to breath and having increased WOB prompting her ED visit 22.   In the ED she was noted to be tachycardic and tachypneic w/ labs significant for a leukocytosis and positive UA. CXR w/ LLL opacity vs atelectasis. She was administered Vanc, Zosyn, Tylenol and Duoneb treatments   PAST MEDICAL & SURGICAL HISTORY:  MI (myocardial infarction)      Personal history of asbestosis      Gout      UTI (lower urinary tract infection)      ETOH abuse      CVA (cerebral vascular accident)      Tracheobronchomalacia      HTN (hypertension)      HLD (hyperlipidemia)      History of left shoulder fracture      History of benign breast tumor        Allergies    Toradol (Urticaria (Mild to Mod); Rash (Mild to Mod))    Intolerances      Social History:  - Denies tobacco use  - Former heavy EtOH abuse  - Denies illicit drug use (28 Aug 2022 04:43)    Medications:  albuterol/ipratropium for Nebulization 3 milliLiter(s) Nebulizer every 6 hours  allopurinol 100 milliGRAM(s) Oral daily  atorvastatin 20 milliGRAM(s) Oral at bedtime  cyanocobalamin 1000 MICROGram(s) Oral daily  enoxaparin Injectable 40 milliGRAM(s) SubCutaneous every 24 hours  escitalopram 10 milliGRAM(s) Oral daily  guaiFENesin Oral Liquid (Sugar-Free) 300 milliGRAM(s) Oral every 6 hours  HYDROmorphone  Injectable 0.25 milliGRAM(s) IV Push once  levETIRAcetam  Solution 750 milliGRAM(s) Oral two times a day  levothyroxine 75 MICROGram(s) Oral daily  ondansetron Injectable 4 milliGRAM(s) IV Push every 6 hours PRN Nausea and/or Vomiting  psyllium Powder 1 Packet(s) Oral daily  sodium chloride 3%  Inhalation 4 milliLiter(s) Inhalation every 12 hours  traMADol 50 milliGRAM(s) Oral two times a day PRN Severe Pain (7 - 10)    Labs:  CBC Full  -  ( 05 Sep 2022 13:21 )  WBC Count : 6.00 K/uL  RBC Count : 2.76 M/uL  Hemoglobin : 8.2 g/dL  Hematocrit : 27.9 %  Platelet Count - Automated : 227 K/uL  Mean Cell Volume : 101.1 fl  Mean Cell Hemoglobin : 29.7 pg  Mean Cell Hemoglobin Concentration : 29.4 gm/dL  Auto Neutrophil # : x  Auto Lymphocyte # : x  Auto Monocyte # : x  Auto Eosinophil # : x  Auto Basophil # : x  Auto Neutrophil % : x  Auto Lymphocyte % : x  Auto Monocyte % : x  Auto Eosinophil % : x  Auto Basophil % : x        136  |  98  |  20  ----------------------------<  115<H>  5.1   |  29  |  0.54    Ca    9.4      05 Sep 2022 13:21  Phos  3.5       Mg     1.8     -05    Serum Protein Electrophoresis Interp: Normal Electrophoresis Pattern (22 @ 09:40) Immunofixation, Serum (22 @ 09:40)   Immunofixation, Serum:   No Monoclonal Band Identified   Reference Range: None Detected   Immunoglobulins Panel (22 @ 09:40)   Quantitative Ig mg/dL   Quantitative IgA: 257 mg/dL   Quantitative IgM: 68 mg/dL   MICHELLE Kappa: 2.88 mg/dL   MICHELLE Lambda: 2.35 mg/dL   Kappa/Lambda Free Light Chain Ratio, Serum: 1.23 Ratio     Radiology:             ROS:  Patient comfortable without distress  No SOB or chest pain  No palpitation  No abdominal pain, diarrhaea or constipation  No weakness of extremities  No skin changes or swelling of legs  Rest of the comprehensive ROS was negative  Vital Signs Last 24 Hrs  T(C): 36.9 (06 Sep 2022 09:14), Max: 36.9 (06 Sep 2022 09:14)  T(F): 98.5 (06 Sep 2022 09:14), Max: 98.5 (06 Sep 2022 09:14)  HR: 93 (06 Sep 2022 09:14) (91 - 93)  BP: 137/51 (06 Sep 2022 09:14) (120/84 - 137/51)  BP(mean): --  RR: 20 (06 Sep 2022 09:14) (18 - 20)  SpO2: 99% (06 Sep 2022 09:14) (97% - 99%)    Parameters below as of 06 Sep 2022 09:14  Patient On (Oxygen Delivery Method): nasal cannula        Physical exam:  Patient alert and oriented  No distress. Obese  CVS: S1, S2   Chest: bilateral breath sound without rales  Abdomen: soft, not tender, no organomegaly or masses. Had PEG,   CNS: No focal neuro deficit  Musculoskeletal:  Normal range of motion  Skin: No rash    Assessment and Plan:

## 2022-09-06 NOTE — PROGRESS NOTE ADULT - ASSESSMENT
Pt is an 85yo F w/ PMH of severe tracheobronchomalacia, HTN, HLD, CAD, CVA w/ residual L sided weakness, Intrinsic lung disease, Aortic stenosis, R breast CA s/p mastectomy, L lung collapse and MRSE p/w dyspnea from rehab.   She has a previous hx of resp failure d/t mucus plugging w/ complete L lung collapse and severe tracheobronchomalacia requiring hospitalization at [facility] in March 2022. After discharge to rehab pt was maintained on BiPAP for about one month's time during the evening hours but was subsequently taken off. She was reportedly doing well until until started complaining of an inability to breath and having increased WOB prompting her ED visit 8/28/22.   In the ED she was noted to be tachycardic and tachypneic w/ labs significant for a leukocytosis and positive UA. CXR w/ LLL opacity vs atelectasis. She was administered Vanc, Zosyn, Tylenol and Duoneb treatments     She had right simple mastectomy in 4/2019 [id number] breast cancer, ER, [place] positive and Her-2 negative.   She would ideally have received adjuvant hormonal therapy.    Any Rx after that is unknown. CT (12.30.21 @ 14:12) >  BONES: Degenerative changes. T11 compression deformity, unchanged.   Redemonstration of an indeterminate lytic lesion in the right acetabulum.  Patient was in hospital at that time with perforated appendix with collection, managed conservatively.  Further evaluation was hampered by her comorbidities though thought was to get bone scan eventually as outpatient.  Nowhase lytic lesions in T spine  Paraproteinemia eval. unremarkable  Dx can only be made with bone bx when stable and if desired  I had a long discussion with patient's son Sy 8/30/22. .   He was told about new findings on scars.  He understood the limitations of management for his mother because of respiratory status and co morbidities. He also understands the limitations of giving any treatment even if dx is made  However if possible at some time, he would like to know dx.  He finally said, he will talk to his brother.  For now concentrate on getting her respiratory status better.   If down the line if her condition improves in couple of months reconsider getting dx with biopsy as he would like to know that.   Pt is an 87yo F w/ PMH of severe tracheobronchomalacia, HTN, HLD, CAD, CVA w/ residual L sided weakness, Intrinsic lung disease, Aortic stenosis, R breast CA s/p mastectomy, L lung collapse and MRSE p/w dyspnea from rehab.   She has a previous hx of resp failure d/t mucus plugging w/ complete L lung collapse and severe tracheobronchomalacia requiring hospitalization at [facility] in March 2022. After discharge to rehab pt was maintained on BiPAP for about one month's time during the evening hours but was subsequently taken off. She was reportedly doing well until until started complaining of an inability to breath and having increased WOB prompting her ED visit 8/28/22.   In the ED she was noted to be tachycardic and tachypneic w/ labs significant for a leukocytosis and positive UA. CXR w/ LLL opacity vs atelectasis. She was administered Vanc, Zosyn, Tylenol and Duoneb treatments     She had right simple mastectomy in 4/2019 [id number] breast cancer, ER, [place] positive and Her-2 negative.   She would ideally have received adjuvant hormonal therapy.  Any Rx after that is unknown. CT (12.30.21 @ 14:12) >  BONES: Degenerative changes. T11 compression deformity, unchanged.   Redemonstration of an indeterminate lytic lesion in the right acetabulum.  Patient was in hospital at that time with perforated appendix with collection, managed conservatively.  Further evaluation was hampered by her comorbidities though thought was to get bone scan eventually as outpatient.  Now has lytic lesions in T spine  Paraproteinemia eval. unremarkable  Dx can only be made with bone bx but most likely mets from breast. I had a long discussion with patient's son Sy 8/30/22. .   He was told about new findings on scars.  He understood the limitations of management for his mother because of respiratory status and co morbidities. He also understands the limitations of giving any treatment   However if possible at some time, he would like to know dx.  Present plan at this time is to evaluate T spine by MRI if family is Ok with it.  I can also start her on Arimidex by PEG if family is OK with it as metastatic breast cancer is clinically the most likely dx.  Son will call back and or text Dr. Almazan and will plan accordingly

## 2022-09-06 NOTE — PROGRESS NOTE ADULT - ASSESSMENT
ASSESSMENT:    Asked by the medical housestaff to evaluate the patient in pulmonary consultation. The patient is a 87 year old gentlewoman, lifelong non-smoker, well known to me without history of intrinsic lung disease. 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 She also has a history of HTN, HLD, CAD s/p MI with preserved LVEF and moderate aortic stenosis. The patient was admitted to [place] in December 2021 with fever and abdominal pain. She was found to have perforated appendicitis with abscess formation. She was treated with tube drainage and antibiotics for a polymicrobial infection. Hospital course was complicated by respiratory failure due to mucous plugging with complete collapse of the left lung. She underwent several bronchoscopies for pulmonary toilet and was found to have severe tracheobronchomalacia. She was treated with bronchodilators, mucolytic nebs, chest vest and nocturnal AVAPS with expansion of the left upper lobe and some reexpansion of the left lower lobe. She was admitted in March with hematochezia with a surprisingly stable respiratory status. The left lower lobe was well aerated on a CT scan of the abdomen and pelvis with only bibasilar subsegmental atelectasis - there was scattered sigmoid diverticulosis without evidence of diverticulitis - interval decrease in size of a fluid collection in the region of the previously perforated appendicitis measuring 2.8 x 1.5 cm previously measuring 4.0 x 2.2 cm - slight increase in mild adjacent inflammatory change. The GI bleed was treated conservatively.  She has been doing "well" at rehab although she remains bedbound and NPO. She is no longer wearing nocturnal NIV.  She returns with shortness of breath in the setting of back, neck and left lower extremity pain. ENT evaluation -> no upper airway pathology. The patient had been placed on BIPAP for mild acute hypercapnic respiratory failure that has resolved and to buttress open the airways. She is awake and alert. She currently has no shortness of breath or hypoxemia on room air. No cough, sputum production, hemoptysis, chest congestion or wheeze. No fevers, chills or sweats. No chest pain/pressure or palpitations.     the patient returns from [facility] rehab with dyspnea with mild hypercapnic respiratory failure in the setting of neck, back and left leg pain (chronically contracted) - the respiratory acidosis has resolved with NIV support and the patient is now without shortness of breath or hypoxemia on room air - the patient has severe tracheobronchomalacia that has resulted in mucous plugging with complete left lung collapse requiring several bronchoscopies for pulmonary toilet - the patient was felt not to be a candidate for surgery for the tracheobronchomalacia and stenting was felt to have more risk than benefit - she has not had significant mucous plugging for ~ 6 months - CXR reveals a poor inspiratory effort and subsegmental left lower lobe atelectasis - it is unlikely that she has pneumonia despite the mild leukocytosis    PLAN/RECOMMENDATIONS:    stable oxygenation on room air  VBG -> 7.31/51/45/26/73.4 -> mild respiratory and metabolic acidosis  no longer using BIPAP for sleep   on cefazolin (?)  CT scan -> bilateral dependent atelectasis - resolution of left lower lobe complete collapse - multiple lytic bone lesions predominantly in the thoracic vertebral bodies which is concerning for metastatic disease   no indication for bronchoscopy at this time  humeral radiograph -> redemonstrated focal lytic lesion in proximal medial humeral diaphysis with small area of permeative cortical destruction.  CT scan -> multiple lytic lesions are seen throughout the axial and appendicular skeleton, some of which appear increased in size from CT abdomen pelvis 3/16/2022 when evaluated in retrospect - a few lytic lesions involve the left intertrochanteric region and right acetabulum  it is likely that the patient has metastatic breast cancer  incentive spirometry  acapella device  observe off antibiotics  albuterol/atrovent nebs q6h  hypertonic saline nebs q12h  guaifenesin 300mg 4 times daily via PEG  puree diet with moderately thickened fluids - PEG feeds  hyponatremia has resolved  treatment of HTN, HLD, CAD, aortic stenosis per cardiology  DVT prophylaxis  analgesics    Thank you for the courtesy of this referral. Plan of care discussed with the patient and her son at bedside and with the medical housestaff.    Kennedy Marcano MD, [facility]  [phone]  Pulmonary Medicine

## 2022-09-07 NOTE — PROGRESS NOTE ADULT - SUBJECTIVE AND OBJECTIVE BOX
Patient is a 87y old  Female who presents with a chief complaint of SOB (06 Sep 2022 11:42)      SUBJECTIVE / OVERNIGHT EVENTS:    PT consult, MR thoracic spine to eval for spinal cord involvement ordered, pending d/c  Pt set to go to [facility]    MEDICATIONS  (STANDING):  albuterol/ipratropium for Nebulization 3 milliLiter(s) Nebulizer every 6 hours  allopurinol 100 milliGRAM(s) Oral daily  atorvastatin 20 milliGRAM(s) Oral at bedtime  cyanocobalamin 1000 MICROGram(s) Oral daily  enoxaparin Injectable 40 milliGRAM(s) SubCutaneous every 24 hours  escitalopram 10 milliGRAM(s) Oral daily  guaiFENesin Oral Liquid (Sugar-Free) 300 milliGRAM(s) Oral every 6 hours  HYDROmorphone  Injectable 0.25 milliGRAM(s) IV Push once  levETIRAcetam  Solution 750 milliGRAM(s) Oral two times a day  levothyroxine 75 MICROGram(s) Oral daily  melatonin 3 milliGRAM(s) Oral at bedtime  psyllium Powder 1 Packet(s) Oral daily  sodium chloride 3%  Inhalation 4 milliLiter(s) Inhalation every 12 hours    MEDICATIONS  (PRN):  ondansetron Injectable 4 milliGRAM(s) IV Push every 6 hours PRN Nausea and/or Vomiting  traMADol 50 milliGRAM(s) Oral two times a day PRN Severe Pain (7 - 10)      CAPILLARY BLOOD GLUCOSE        I&O's Summary    05 Sep 2022 07:01  -  06 Sep 2022 07:00  --------------------------------------------------------  IN: 1360 mL / OUT: 1900 mL / NET: -540 mL    06 Sep 2022 07:01  -  07 Sep 2022 06:42  --------------------------------------------------------  IN: 0 mL / OUT: 1000 mL / NET: -1000 mL        Vital Signs Last 24 Hrs  T(C): 36.2 (07 Sep 2022 04:57), Max: 36.9 (06 Sep 2022 09:14)  T(F): 97.2 (07 Sep 2022 04:57), Max: 98.5 (06 Sep 2022 09:14)  HR: 100 (07 Sep 2022 04:57) (93 - 110)  BP: 110/71 (07 Sep 2022 04:57) (100/67 - 137/51)  BP(mean): --  RR: 18 (07 Sep 2022 04:57) (18 - 20)  SpO2: 98% (07 Sep 2022 04:57) (97% - 99%)    Parameters below as of 07 Sep 2022 04:57  Patient On (Oxygen Delivery Method): nasal cannula        PHYSICAL EXAM:  GENERAL: very raspy voice, complaining of L leg pain  HEAD: Atraumatic, Normocephalic  EYES: EOMI, PERRLA, conjunctiva and sclera clear  NECK: Supple, No JVD  CHEST/LUNG: diffusely rhoncherous  HEART: Normal S1/S2; Regular rate and rhythm; systolic murmur  ABDOMEN: Soft, Nontender, Nondistended; Bowel sounds present  EXTREMITIES: 2+ Peripheral Pulses; 3+ edema, sensitive L leg  PSYCH: A&Ox1  NEUROLOGY: not following directions, bedbound  SKIN: No rashes or lesions  LABS:                        8.2    6.00  )-----------( 227      ( 05 Sep 2022 13:21 )             27.9      09-05    136  |  98  |  20  ----------------------------<  115<H>  5.1   |  29  |  0.54    Ca    9.4      05 Sep 2022 13:21  Phos  3.5     09-05  Mg     1.8     09-05                RADIOLOGY & ADDITIONAL TESTS:    Imaging Personally Reviewed:    Consultant(s) Notes Reviewed:      Care Discussed with Consultants/Other Providers:

## 2022-09-07 NOTE — PROGRESS NOTE ADULT - SUBJECTIVE AND OBJECTIVE BOX
afberile    REVIEW OF SYSTEMS:  GEN: no fever,    no chills  RESP: no SOB,   no cough  CVS: no chest pain,   no palpitations  GI: no abdominal pain,   no nausea,   no vomiting,   no constipation,   no diarrhea  : no dysuria,   no frequency  NEURO: no headache,   no dizziness  PSYCH: no depression,   not anxious  Derm : no rash    MEDICATIONS  (STANDING):  albuterol/ipratropium for Nebulization 3 milliLiter(s) Nebulizer every 6 hours  allopurinol 100 milliGRAM(s) Oral daily  atorvastatin 20 milliGRAM(s) Oral at bedtime  cyanocobalamin 1000 MICROGram(s) Oral daily  enoxaparin Injectable 40 milliGRAM(s) SubCutaneous every 24 hours  escitalopram 10 milliGRAM(s) Oral daily  guaiFENesin Oral Liquid (Sugar-Free) 300 milliGRAM(s) Oral every 6 hours  HYDROmorphone  Injectable 0.25 milliGRAM(s) IV Push once  levETIRAcetam  Solution 750 milliGRAM(s) Oral two times a day  levothyroxine 75 MICROGram(s) Oral daily  melatonin 3 milliGRAM(s) Oral at bedtime  psyllium Powder 1 Packet(s) Oral daily  sodium chloride 3%  Inhalation 4 milliLiter(s) Inhalation every 12 hours    MEDICATIONS  (PRN):  ondansetron Injectable 4 milliGRAM(s) IV Push every 6 hours PRN Nausea and/or Vomiting  traMADol 50 milliGRAM(s) Oral two times a day PRN Severe Pain (7 - 10)      Vital Signs Last 24 Hrs  T(C): 36.2 (07 Sep 2022 04:57), Max: 36.9 (06 Sep 2022 09:14)  T(F): 97.2 (07 Sep 2022 04:57), Max: 98.5 (06 Sep 2022 09:14)  HR: 100 (07 Sep 2022 04:57) (93 - 110)  BP: 110/71 (07 Sep 2022 04:57) (100/67 - 137/51)  BP(mean): --  RR: 18 (07 Sep 2022 04:57) (18 - 20)  SpO2: 98% (07 Sep 2022 04:57) (97% - 99%)    Parameters below as of 07 Sep 2022 04:57  Patient On (Oxygen Delivery Method): nasal cannula      CAPILLARY BLOOD GLUCOSE        I&O's Summary    05 Sep 2022 07:01  -  06 Sep 2022 07:00  --------------------------------------------------------  IN: 1360 mL / OUT: 1900 mL / NET: -540 mL    06 Sep 2022 07:01  -  07 Sep 2022 06:51  --------------------------------------------------------  IN: 0 mL / OUT: 1000 mL / NET: -1000 mL        PHYSICAL EXAM:  HEAD:  Atraumatic, Normocephalic  NECK: Supple, No   JVD  CHEST/LUNG:   no     rales,     no,    rhonchi  HEART: Regular rate and rhythm;         murmur  ABDOMEN: Soft, Nontender, ;   EXTREMITIES:    no    edema  NEUROLOGY:  alert    LABS:                        8.2    6.00  )-----------( 227      ( 05 Sep 2022 13:21 )             27.9     09-05    136  |  98  |  20  ----------------------------<  115<H>  5.1   |  29  |  0.54    Ca    9.4      05 Sep 2022 13:21  Phos  3.5     09-05  Mg     1.8     09-05                              Consultant(s) Notes Reviewed:      Care Discussed with Consultants/Other Providers:

## 2022-09-07 NOTE — PHYSICAL THERAPY INITIAL EVALUATION ADULT - ACTIVE RANGE OF MOTION EXAMINATION, REHAB EVAL
R sh fl/ 70 PROM 90, elbow full fl/ext, wrist full. sup/pron full, thumb and first finger full movement, 3-5th finger, flexed in palm, RLE hip l 60 knee fl 75, full ext, ankle 35 PF -30 from neutral DF, , L finger flexed in palm,  wrist fl 70 ext neutral, pron full sup 45, L elbow -10 to 115, sh fl to 30 PRPOM LLE hip fl 100, knee-30 to 75 knee fl   Ankle -30-45 PF,  all PROM

## 2022-09-07 NOTE — PROGRESS NOTE ADULT - ASSESSMENT
ASSESSMENT:    Asked by the medical housestaff to evaluate the patient in pulmonary consultation. The patient is a 87 year old gentlewoman, lifelong non-smoker, well known to me without history of intrinsic lung disease. 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 She also has a history of HTN, HLD, CAD s/p MI with preserved LVEF and moderate aortic stenosis. The patient was admitted to [place] in December 2021 with fever and abdominal pain. She was found to have perforated appendicitis with abscess formation. She was treated with tube drainage and antibiotics for a polymicrobial infection. Hospital course was complicated by respiratory failure due to mucous plugging with complete collapse of the left lung. She underwent several bronchoscopies for pulmonary toilet and was found to have severe tracheobronchomalacia. She was treated with bronchodilators, mucolytic nebs, chest vest and nocturnal AVAPS with expansion of the left upper lobe and some reexpansion of the left lower lobe. She was admitted in March with hematochezia with a surprisingly stable respiratory status. The left lower lobe was well aerated on a CT scan of the abdomen and pelvis with only bibasilar subsegmental atelectasis - there was scattered sigmoid diverticulosis without evidence of diverticulitis - interval decrease in size of a fluid collection in the region of the previously perforated appendicitis measuring 2.8 x 1.5 cm previously measuring 4.0 x 2.2 cm - slight increase in mild adjacent inflammatory change. The GI bleed was treated conservatively.  She has been doing "well" at rehab although she remains bedbound and NPO. She is no longer wearing nocturnal NIV.  She returns with shortness of breath in the setting of back, neck and left lower extremity pain. ENT evaluation -> no upper airway pathology. The patient had been placed on BIPAP for mild acute hypercapnic respiratory failure that has resolved and to buttress open the airways. She is awake and alert. She currently has no shortness of breath or hypoxemia on room air. No cough, sputum production, hemoptysis, chest congestion or wheeze. No fevers, chills or sweats. No chest pain/pressure or palpitations.     the patient returns from [facility] rehab with dyspnea with mild hypercapnic respiratory failure in the setting of neck, back and left leg pain (chronically contracted) - the respiratory acidosis has resolved with NIV support and the patient is now without shortness of breath or hypoxemia on room air - the patient has severe tracheobronchomalacia that has resulted in mucous plugging with complete left lung collapse requiring several bronchoscopies for pulmonary toilet - the patient was felt not to be a candidate for surgery for the tracheobronchomalacia and stenting was felt to have more risk than benefit - she has not had significant mucous plugging for ~ 6 months - CXR reveals a poor inspiratory effort and subsegmental left lower lobe atelectasis - it is unlikely that she has pneumonia despite the mild leukocytosis    PLAN/RECOMMENDATIONS:    stable oxygenation on room air  VBG -> 7.31/51/45/26/73.4 -> mild respiratory and metabolic acidosis  no longer uses BIPAP for sleep   observe off antibiotics  CT scan -> bilateral dependent atelectasis - resolution of left lower lobe complete collapse - multiple lytic bone lesions predominantly in the thoracic vertebral bodies which is concerning for metastatic disease   no indication for bronchoscopy at this time  CT scan -> multiple lytic lesions are seen throughout the axial and appendicular skeleton, some of which appear increased in size from CT abdomen pelvis 3/16/2022 when evaluated in retrospect - a few lytic lesions involve the left intertrochanteric region and right acetabulum  oncology follow-up    it is likely that the patient has metastatic breast cancer    await MRI results    started on anastrozole   incentive spirometry  acapella device  observe off antibiotics  albuterol/atrovent nebs q6h  hypertonic saline nebs q12h  guaifenesin 300mg 4 times daily via PEG  puree diet with moderately thickened fluids - PEG feeds  hyponatremia has resolved  treatment of HTN, HLD, CAD, aortic stenosis per cardiology  DVT prophylaxis  analgesics    Thank you for the courtesy of this referral. Plan of care discussed with the patient at bedside    Kennedy Marcano MD, [facility]  [phone]  Pulmonary Medicine

## 2022-09-07 NOTE — PROGRESS NOTE ADULT - SUBJECTIVE AND OBJECTIVE BOX
Pt is an 85yo F w/ PMH of severe tracheobronchomalacia, HTN, HLD, CAD, CVA w/ residual L sided weakness, Intrinsic lung disease, Aortic stenosis, R breast CA s/p mastectomy, L lung collapse and MRSE p/w dyspnea from rehab.   She has a previous hx of resp failure d/t mucus plugging w/ complete L lung collapse and severe tracheobronchomalacia requiring hospitalization at [facility] in March 2022. After discharge to rehab pt was maintained on BiPAP for about one month's time during the evening hours but was subsequently taken off. She was reportedly doing well until until started complaining of an inability to breath and having increased WOB prompting her ED visit 8/28/22.   In the ED she was noted to be tachycardic and tachypneic w/ labs significant for a leukocytosis and positive UA. CXR w/ LLL opacity vs atelectasis. She was administered Vanc, Zosyn, Tylenol and Duoneb treatments    PAST MEDICAL & SURGICAL HISTORY:  MI (myocardial infarction)      Personal history of asbestosis      Gout      UTI (lower urinary tract infection)      ETOH abuse      CVA (cerebral vascular accident)      Tracheobronchomalacia      HTN (hypertension)      HLD (hyperlipidemia)      History of left shoulder fracture      History of benign breast tumor        Allergies    Toradol (Urticaria (Mild to Mod); Rash (Mild to Mod))    Intolerances      Social History:  - Denies tobacco use  - Former heavy EtOH abuse  - Denies illicit drug use (28 Aug 2022 04:43)    Medications:  albuterol/ipratropium for Nebulization 3 milliLiter(s) Nebulizer every 6 hours  allopurinol 100 milliGRAM(s) Oral daily  atorvastatin 20 milliGRAM(s) Oral at bedtime  cyanocobalamin 1000 MICROGram(s) Oral daily  enoxaparin Injectable 40 milliGRAM(s) SubCutaneous every 24 hours  escitalopram 10 milliGRAM(s) Oral daily  levETIRAcetam  Solution 750 milliGRAM(s) Oral two times a day  levothyroxine 75 MICROGram(s) Oral daily  melatonin 3 milliGRAM(s) Oral at bedtime  psyllium Powder 1 Packet(s) Oral daily  sodium chloride 3%  Inhalation 4 milliLiter(s) Inhalation every 12 hours  traMADol 50 milliGRAM(s) Oral two times a day PRN Severe Pain (7 - 10)    Labs:  CBC Full  -  ( 05 Sep 2022 13:21 )  WBC Count : 6.00 K/uL  RBC Count : 2.76 M/uL  Hemoglobin : 8.2 g/dL  Hematocrit : 27.9 %  Platelet Count - Automated : 227 K/uL  Mean Cell Volume : 101.1 fl  Mean Cell Hemoglobin : 29.7 pg  Mean Cell Hemoglobin Concentration : 29.4 gm/dL  Auto Neutrophil # : x  Auto Lymphocyte # : x  Auto Monocyte # : x  Auto Eosinophil # : x  Auto Basophil # : x  Auto Neutrophil % : x  Auto Lymphocyte % : x  Auto Monocyte % : x  Auto Eosinophil % : x  Auto Basophil % : x    09-05    136  |  98  |  20  ----------------------------<  115<H>  5.1   |  29  |  0.54    Ca    9.4      05 Sep 2022 13:21  Phos  3.5     09-05  Mg     1.8     09-05        Radiology:             ROS:  Patient comfortable without distress  No SOB or chest pain  No palpitation  No abdominal pain, diarrhaea or constipation  No weakness of extremities  No skin changes or swelling of legs  Rest of the comprehensive ROS was negative  Vital Signs Last 24 Hrs  T(C): 36.8 (07 Sep 2022 08:48), Max: 36.9 (06 Sep 2022 20:25)  T(F): 98.2 (07 Sep 2022 08:48), Max: 98.5 (06 Sep 2022 20:25)  HR: 102 (07 Sep 2022 08:48) (100 - 110)  BP: 92/61 (07 Sep 2022 08:48) (92/61 - 114/64)  BP(mean): --  RR: 18 (07 Sep 2022 08:48) (18 - 19)  SpO2: 99% (07 Sep 2022 08:48) (97% - 99%)    Parameters below as of 07 Sep 2022 08:48  Patient On (Oxygen Delivery Method): nasal cannula        Physical exam:  Patient alert and oriented  No distress. Obese  CVS: S1, S2   Chest: bilateral breath sound without rales  Abdomen: soft, not tender, no organomegaly or masses. Has PEG  CNS: No focal neuro deficit  Musculoskeletal:  Normal range of motion  Skin: No rash    Assessment and Plan:

## 2022-09-07 NOTE — PROGRESS NOTE ADULT - SUBJECTIVE AND OBJECTIVE BOX
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;     INTERVAL HISTORY: awaiting MRIs to better evaluate the extent of her metastatic breast cancer - regardless was started on anastrozole by oncology; continues on a puree diet with moderately thickened fluids in addition to full PEG feeds; mental status seems at baseline; no shortness of breath or hypoxemia on room air although making a grunting noise; occasional cough productive of scant sputum; no hemoptysis, chest congestion or wheeze; no fevers, chills or sweats; no chest pain/pressure or palpitations; being observed off antibiotics;    REVIEW OF SYSTEMS:  Constitutional: As per interval history  HEENT: Within normal limits  CV: As per interval history  Resp: As per interval history  GI: dysphagia  : Within normal limits  Musculoskeletal: Within normal limits  Skin: Within normal limits  Neurological: CVA - left sided plegia  Psychiatric: Within normal limits  Endocrine: Within normal limits  Hematologic/Lymphatic: Within normal limits  Allergic/Immunologic: Within normal limits    MEDICATIONS:     Pulmonary "  albuterol/ipratropium for Nebulization 3 milliLiter(s) Nebulizer every 6 hours  sodium chloride 3%  Inhalation 4 milliLiter(s) Inhalation every 12 hours    Anti-microbials:    Cardiovascular:    Other:  allopurinol 100 milliGRAM(s) Oral daily  anastrozole 1 milliGRAM(s) Oral daily  atorvastatin 20 milliGRAM(s) Oral at bedtime  cyanocobalamin 1000 MICROGram(s) Oral daily  enoxaparin Injectable 40 milliGRAM(s) SubCutaneous every 24 hours  escitalopram 10 milliGRAM(s) Oral daily  levETIRAcetam  Solution 750 milliGRAM(s) Oral two times a day  levothyroxine 75 MICROGram(s) Oral daily  melatonin 3 milliGRAM(s) Oral at bedtime  psyllium Powder 1 Packet(s) Oral daily    MEDICATIONS  (PRN):  traMADol 50 milliGRAM(s) Oral two times a day PRN Severe Pain (7 - 10)        OBJECTIVE:    Daily Weight in k.1 (07 Sep 2022 08:48)    PHYSICAL EXAM:       ICU Vital Signs Last 24 Hrs  T(C): 36.8 (07 Sep 2022 08:48), Max: 36.9 (06 Sep 2022 20:25)  T(F): 98.2 (07 Sep 2022 08:48), Max: 98.5 (06 Sep 2022 20:25)  HR: 102 (07 Sep 2022 08:48) (100 - 110)  BP: 92/61 (07 Sep 2022 08:48) (92/61 - 114/64)  BP(mean): --  ABP: --  ABP(mean): --  RR: 18 (07 Sep 2022 08:48) (18 - 19)  SpO2: 99% (07 Sep 2022 08:48) (97% - 99%) on room air    General: Awake and alert. Cooperative. No distress Appears stated age. Bedbound  HEENT: Atraumatic. Normocephalic. Anicteric. Normal oral mucosa. PERRL. EOMI. Mallampati class IV airway.  Neck: Supple. Trachea midline. Thyroid without enlargement/tenderness/nodules. No carotid bruit. No JVD. Short and wide.  Cardiovascular: Regular rate and rhythm. S1 S2 normal. III/VI systolic murmur  Respiratory: Respirations unlabored. Bilateral basilar rales. No wheeze. Kyphosis. Poor chest wall excursion  Abdomen: Soft. Non-tender. Non-distended. No organomegaly. No masses. Normal bowel sounds. Obese. PEG.  Extremities: Warm to touch. No clubbing or cyanosis. No pedal edema. Large legs. Left leg contracted under the right leg  Pulses: 2+ peripheral pulses all extremities.	  Skin: Normal skin color. No rashes or lesions. No ecchymoses. No cyanosis. Warm to touch.  Lymph Nodes: Cervical, supraclavicular and axillary nodes normal  Neurological: Left lower extremity plegia with contraction. Left upper extremity is quite weak. A and O x 3  Psychiatry: Appropriate mood and affect.    LABS:                          8.2    6.00  )-----------( 227      ( 05 Sep 2022 13:21 )             27.9     CBC    WBC  6.00 <==, 7.53 <==, 9.12 <==    Hemoglobin  8.2 <<==, 8.8 <<==, 8.9 <<==    Hematocrit  27.9 <==, 29.1 <==, 30.4 <==    Platelets  227 <==, 230 <==, 243 <==      136  |  98  |  20  ----------------------------<  115<H>    09-05  5.1   |  29  |  0.54      LYTES    sodium  136 <==, 140 <==, 141 <==    potassium   5.1 <==, 3.6 <==, 3.1 <==    chloride  98 <==, 103 <==, 103 <==    carbon dioxide  29 <==, 23 <==, 24 <==    =============================================================================================  RENAL FUNCTION:    Creatinine:   0.54  <<==, 0.65  <<==, 0.60  <<==    BUN:   20 <==, 15 <==, 14 <==    ============================================================================================    calcium   9.4 <==, 9.2 <==, 9.2 <==    phos   3.5 <==, 2.4 <==, 2.9 <==    mag   1.8 <==, 1.7 <==, 1.8 <==    ============================================================================================  LFTs    AST:   18 <==     ALT:  14  <==     AP:  118  <=    Bili:  0.2  <=    PT/INR - ( 30 Aug 2022 12:12 )   PT: 13.8 sec;   INR: 1.20 ratio      Venous Blood Gas:   @ 09:03  7.31/51/45/26/73.4  VBG Lactate: --    Venous Blood Gas:   @ 22:32  7.36/47/48/27/79.0  VBG Lactate: --    Venous Blood Gas:   @ 20:50  7.31/48/57/24/86.9  VBG Lactate: 2.5    PT/INR - ( 30 Aug 2022 12:12 )   PT: 13.8 sec;   INR: 1.20 ratio       PTT - ( 30 Aug 2022 12:12 )  PTT:32.1 sec    Serum Pro-Brain Natriuretic Peptide: 155 pg/mL ( @ 21:06)    CARDIAC MARKERS ( 27 Aug 2022 21:06 )  CPK x     /CKMB x     /CKMB Units x        troponin 21 ng/L    < from: TTE with Doppler (w/Cont) (22 @ 14:08) >    Patient name: FRANKI MEHTA  YOB: 1935   Age: 86 (F)   MR#: [id number]  Study Date: 2022  ------------------------------------------------------------------------  Dimensions:    Normal Values:  LA:     2.5    2.0 - 4.0 cm  Ao:     3.3    2.0 - 3.8 cm  SEPTUM: 1.6    0.6 - 1.2 cm  PWT:    0.9    0.6 - 1.1 cm  LVIDd:  3.6    3.0 - 5.6 cm  LVIDs:  2.5    1.8 - 4.0 cm  Derived variables:  LVMI: 77 g/m2  RWT: 0.50  Fractional short: 31 %  EF (Visual Estimate): 70-75 %  ------------------------------------------------------------------------  Conclusions:  1. Concentric left ventricular hypertrophy.  2. Hyperdynamic left ventricular systolic function.  Endocardial visualization enhanced with intravenous  injection of Ultrasonic Enhancing Agent (Definity).  3. The right ventricle is not well visualized; grossly  normal right ventricular systolic function.  Pt refused to proceed with exam.  Limited Doppler study.  No trans aortic gradients.  Unable to rule out endocarditis.  ------------------------------------------------------------------------  Confirmed on 2022 - 15:42:57 by Kole Mcfarland M.D.  FASE  ------------------------------------------------------------------------  ---------------------------------------------------------------------------------------------------------------  MICROBIOLOGY:     Respiratory Viral Panel with COVID-19 by RYAN (22 @ 21:05)   Rapid RVP Result: [facility]   SARS-CoV-2: [facility]  This Respiratory Panel uses polymerase chain reaction (PCR) to detect for   adenovirus; coronavirus (HKU1, NL63, 229E, OC43); human metapneumovirus   (hMPV); human enterovirus/rhinovirus (Entero/RV); influenza A; influenza   A/H1; influenza A/H3; influenza A/H1-2009; influenza B; parainfluenza   viruses 1, 2, 3, 4; respiratory syncytial virus; Mycoplasma pneumoniae;   Chlamydophila pneumoniae; and SARS-CoV-2.     Urinalysis Basic - ( 27 Aug 2022 22:42 )    Color: Light Orange / Appearance: Turbid / S.013 / pH: x  Gluc: x / Ketone: Negative  / Bili: Negative / Urobili: Negative   Blood: x / Protein: 30 mg/dL / Nitrite: Negative   Leuk Esterase: Large / RBC: 8 /hpf / WBC 1145 /HPF   Sq Epi: x / Non Sq Epi: 1 /hpf / Bacteria: Moderate    Culture - Urine (22 @ 22:42)   - Nitrofurantoin: I 64 Should not be used to treat pyelonephritis.   - Tetra/Doxy: R >8   - Vancomycin: S 1   - Ampicillin: R >8 Predicts results to ampicillin/sulbactam, amoxacillin-clavulanate and piperacillin-tazobactam.   - Ciprofloxacin: R >2   - Levofloxacin: R >4   Specimen Source: Clean Catch Clean Catch (Midstream)   Culture Results:   >100,000 CFU/ml Enterococcus faecium   <10,000 CFU/ml Normal Urogenital ck present     Historical Values  Culture - Urine (22 @ 22:42)   Specimen Source: Clean Catch Clean Catch (Midstream)   Culture Results:   >100,000 CFU/ml Enterococcus faecium   <10,000 CFU/ml Normal Urogenital ck present   Organism Identification: Enterococcus faecium     Culture - Blood (22 @ 20:30)   Specimen Source: .Blood Blood-Peripheral   Culture Results:   No growth to date.     Culture - Blood (22 @ 20:15)   Specimen Source: .Blood Blood-Peripheral   Culture Results:   No growth to date.     MRSA/MSSA PCR (22 @ 05:23)   MRSA PCR Result.: NotDetec  Staph aureus PCR Result: NotDetec     RADIOLOGY:  [x ] Chest radiographs reviewed and interpreted by me    EXAM:  CT CHEST                          PROCEDURE DATE:  2022      FINDINGS:    LYMPH NODES: No mediastinal lymphadenopathy.    HEART/VASCULATURE: The heart is normal in size. No pericardial effusion.   Aortic valve calcifications noted.    AIRWAYS/LUNGS/PLEURA: Patent central airways. Right lower lobe dependent   atelectasis appear similar to prior. Patchy groundglass opacities the   right apex are improved. Interval resolution of left lower lobe complete   collapse. There is mild dependent atelectasis of the left upper and lower   lobes.    UPPER ABDOMEN: Gallbladder stones.    BONES/SOFT TISSUES: Status post right mastectomy. Status post left   humerus ORIF. Marked kyphosis. Multiple lytic bone lesions predominantly   in the posterior and lateral thoracic vertebral bodies. It is unclear if   there is any spinal canal extension. Lytic lesions are alsoseen at the   right posterior seventh rib and right humeral head    OTHER: Right maxillary sinus is opacified.      IMPRESSION:  Bilateral dependent atelectasis. Interval resolution of left lower lobe   complete collapse.    Multiple lytic bone lesions, predominantly in the thoracic vertebral   bodies which is concerning for metastatic disease. Unclear if there is   any spinal canal extension. Recommend MRI of the thoracic spine for   further evaluation.    BAILEY STARR MD; Resident Radiologist  This document has been electronically signed.  HAY IRVIN MD; Attending Radiologist  This document has been electronically signed. Aug 29 2022 12:01PM  ---------------------------------------------------------------------------------------------------------------  EXAM:  CT ABDOMEN AND PELVIS IC                          PROCEDURE DATE:  2022      IMPRESSION:    Multiple lytic lesions are seen throughout the axial and appendicular   skeleton, some of which appear increased in size from CT abdomen pelvis   3/16/2022 when evaluated in retrospect. A few lytic lesions involve the   left intertrochanteric region and right acetabulum.    Endometrial thickening. Recommend dedicated pelvic sonogram.    Cystic lesions are seen within the pancreas. Recommend a dedicated MRI on   a nonemergent basis.    SULY KENDRICK MD; Resident Radiology  This document has been electronically signed.  BRITTANI MALCOLM MD; Attending Radiologist  This document has been electronically signed. Sep  1 2022 11:01AM  ---------------------------------------------------------------------------------------------------------------  EXAM:  XR HUMERUS MIN 2 VIEWS RT                          PROCEDURE DATE:  2022      EXAM:  Internal/external rotation AP right humerus from 2022 at 0927.   Compared to appearance on previous day chest CT.    IMPRESSION:  Generalized osteopenia. Redemonstrated focal lytic lesion in proximal   medial humeral diaphysis with small area of permeative cortical   destruction. No additional radiographically appreciated suspicious lytic   or blastic lesions in remaining imaged regions.    No current fractures or dislocations.    Preserved shoulder and elbow joint spaces.    IV cannula with attached tubing overlies upper arm.    MINE NICE MD; Attending Radiologist  This document has been electronically signed. Aug 30 2022 10:39AM  ---------------------------------------------------------------------------------------------------------------  EXAM:  DUPLEX EXT VEINS UPPER LT                          PROCEDURE DATE:  2022      IMPRESSION:  No evidence of left upper extremity deep venous thrombosis.    FAWN FITZGERALD MD; Attending Radiologist  This document has been electronically signed. Sep  1 2022  1:10PM  ---------------------------------------------------------------------------------------------------------------  EXAM:  DUPLEX SCAN EXT VEINS LOWER BI                          PROCEDURE DATE:  2022      IMPRESSION:  No evidence of deep venous thrombosis in either lower extremity.    KEYLA ROMERO MD; Attending Radiologist  This document has been electronically signed. Aug 31 2022  7:59PM  -----------------------------------------------------------------------------------

## 2022-09-07 NOTE — PROGRESS NOTE ADULT - PROBLEM SELECTOR PLAN 2
Pt had R breast CA s/p mastectomy 4/2019, staged [id number] ER/DE+ and HER-2 negative, without adjuvant hormonal therapy. Prior CT (2021) showed possible lytic lesion, not worked up.  CT Chest (8/29) b/l atelectasis, multiple lytic bone lesions concerning for metastatic disease  CTAP (8/31) multiple lytic lesions increased in size, thickened endometrium.  Paraproteinemia (9/2) pending  DVT scans negative    - heme following; recs appreciated  - further cancer workup as outpatient  - GoC w/ pt and children

## 2022-09-07 NOTE — PROGRESS NOTE ADULT - ASSESSMENT
Pt is an 87yo F w/ PMH of severe tracheobronchomalacia, HTN, HLD, CAD, CVA w/ residual L sided weakness, Intrinsic lung disease, Aortic stenosis, R breast CA s/p mastectomy, L lung collapse and MRSE p/w dyspnea from rehab.   She has a previous hx of resp failure d/t mucus plugging w/ complete L lung collapse and severe tracheobronchomalacia requiring hospitalization at [facility] in March 2022. After discharge to rehab pt was maintained on BiPAP for about one month's time during the evening hours but was subsequently taken off. She was reportedly doing well until until started complaining of an inability to breath and having increased WOB prompting her ED visit 8/28/22.   In the ED she was noted to be tachycardic and tachypneic w/ labs significant for a leukocytosis and positive UA. CXR w/ LLL opacity vs atelectasis. She was administered Vanc, Zosyn, Tylenol and Duoneb treatments     She had right simple mastectomy in 4/2019 [id number] breast cancer, ER, NE positive and Her-2 negative.   She would ideally have received adjuvant hormonal therapy but did not.  CT (12.30.21 @ 14:12) >  BONES: Degenerative changes. T11 compression deformity, unchanged.   Redemonstration of an indeterminate lytic lesion in the right acetabulum.  Patient was in hospital at that time with perforated appendix with collection, managed conservatively.  Further evaluation was hampered by her comorbidities though thought was to get bone scan eventually as outpatient.  Now has lytic lesions in T spine  Paraproteinemia eval. unremarkable  Dx can only be confirmed with bone bx but most likely mets from breast.  She also needed MRI spine but could not tolerate it.   Discussed with her son Sy. He understood that further evaluation of spine at this time is not possible and moreover if she needed radiation that can not be done either because of her inabilty to lie.   She is being started on Arimdex 1 mg PO via PEG tube for most likely dx of metastatic breast cancer. Discussed with Sy, patient's son

## 2022-09-07 NOTE — PHYSICAL THERAPY INITIAL EVALUATION ADULT - MANUAL MUSCLE TESTING RESULTS, REHAB EVAL
R sh 2/5, elbow 3-/5 hand first 2 finger 2/5 other 0/5, RLE 3-/5, LLE 0/5 and LUE2/5 elbow 2/5 hand nonfunctional/grossly assessed due to

## 2022-09-07 NOTE — PROGRESS NOTE ADULT - SUBJECTIVE AND OBJECTIVE BOX
CARDIOLOGY     PROGRESS  NOTE   ________________________________________________    CHIEF COMPLAINT:Patient is a 87y old  Female who presents with a chief complaint of SOB (07 Sep 2022 06:50)  no complain.  	  REVIEW OF SYSTEMS:  CONSTITUTIONAL: No fever, weight loss, or fatigue  EYES: No eye pain, visual disturbances, or discharge  ENT:  No difficulty hearing, tinnitus, vertigo; No sinus or throat pain  NECK: No pain or stiffness  RESPIRATORY: No cough, wheezing, chills or hemoptysis; No Shortness of Breath  CARDIOVASCULAR: No chest pain, palpitations, passing out, dizziness, or leg swelling  GASTROINTESTINAL: No abdominal or epigastric pain. No nausea, vomiting, or hematemesis; No diarrhea or constipation. No melena or hematochezia.  GENITOURINARY: No dysuria, frequency, hematuria, or incontinence  NEUROLOGICAL: No headaches, memory loss, loss of strength, numbness, or tremors  SKIN: No itching, burning, rashes, or lesions   LYMPH Nodes: No enlarged glands  ENDOCRINE: No heat or cold intolerance; No hair loss  MUSCULOSKELETAL: No joint pain or swelling; No muscle, back, or extremity pain  PSYCHIATRIC: No depression, anxiety, mood swings, or difficulty sleeping  HEME/LYMPH: No easy bruising, or bleeding gums  ALLERGY AND IMMUNOLOGIC: No hives or eczema	    [ ] All others negative	  [ ] Unable to obtain    PHYSICAL EXAM:  T(C): 36.2 (09-07-22 @ 04:57), Max: 36.9 (09-06-22 @ 09:14)  HR: 100 (09-07-22 @ 04:57) (93 - 110)  BP: 110/71 (09-07-22 @ 04:57) (100/67 - 137/51)  RR: 18 (09-07-22 @ 04:57) (18 - 20)  SpO2: 98% (09-07-22 @ 04:57) (97% - 99%)  Wt(kg): --  I&O's Summary    06 Sep 2022 07:01  -  07 Sep 2022 07:00  --------------------------------------------------------  IN: 450 mL / OUT: 1200 mL / NET: -750 mL        Appearance: Normal	  HEENT:   Normal oral mucosa, PERRL, EOMI	  Lymphatic: No lymphadenopathy  Cardiovascular: Normal S1 S2, No JVD, + murmurs, + edema  Respiratory:rhonchi  Psychiatry: A & O x 3, Mood & affect appropriate  Gastrointestinal:  Soft, Non-tender, + BS	  Skin: No rashes, No ecchymoses, No cyanosis	  Neurologic: Non-focal  Extremities: Normal range of motion, No clubbing, cyanosis o, +edema  Vascular: Peripheral pulses palpable 2+ bilaterally    MEDICATIONS  (STANDING):  albuterol/ipratropium for Nebulization 3 milliLiter(s) Nebulizer every 6 hours  allopurinol 100 milliGRAM(s) Oral daily  atorvastatin 20 milliGRAM(s) Oral at bedtime  cyanocobalamin 1000 MICROGram(s) Oral daily  enoxaparin Injectable 40 milliGRAM(s) SubCutaneous every 24 hours  escitalopram 10 milliGRAM(s) Oral daily  guaiFENesin Oral Liquid (Sugar-Free) 300 milliGRAM(s) Oral every 6 hours  HYDROmorphone  Injectable 0.25 milliGRAM(s) IV Push once  levETIRAcetam  Solution 750 milliGRAM(s) Oral two times a day  levothyroxine 75 MICROGram(s) Oral daily  melatonin 3 milliGRAM(s) Oral at bedtime  psyllium Powder 1 Packet(s) Oral daily  sodium chloride 3%  Inhalation 4 milliLiter(s) Inhalation every 12 hours      TELEMETRY: 	    ECG:  	  RADIOLOGY:  OTHER: 	  	  LABS:	 	    CARDIAC MARKERS:                                8.2    6.00  )-----------( 227      ( 05 Sep 2022 13:21 )             27.9     09-05    136  |  98  |  20  ----------------------------<  115<H>  5.1   |  29  |  0.54    Ca    9.4      05 Sep 2022 13:21  Phos  3.5     09-05  Mg     1.8     09-05      proBNP: Serum Pro-Brain Natriuretic Peptide: 155 pg/mL (08-27 @ 21:06)    Lipid Profile:   HgA1c:   TSH:     Notes    Notes: Note addendum: As per medical team, pt will need to complete a MRI of  spine to assess for potential metastatic lesions.  will continue to  follow.    Assessment and plan  ---------------------------  Pt is an 85yo F w/ PMH of severe tracheobronchomalacia, HTN, HLD, CAD, CVA w/ residual L sided weakness, Intrinsic lung disease, Aortic stenosis, R breast CA s/p mastectomy, L lung collapse and MRSE p/w dyspnea from rehab.   Unable to obtain further history from patient d/t dyspnea and use of CPAP. Remained of history provided by chart review and collateral from pt's son.  She has a previous hx of resp failure d/t mucus plugging w/ complete L lung collapse and severe tracheobronchomalacia requiring hospitalization at [facility] in March 2022. After discharge to rehab pt was maintained on BiPAP for about one month's time during the evening hours but was subsequently taken off. She was reportedly doing well until until started complaining of an inability to breath and having increased WOB prompting her ED visit.   In the ED she was noted to be tachycardic and tachypneic w/ labs significant for a leukocytosis and positive UA. CXR w/ LLL opacity vs atelectasis. She was administered Vanc, Zosyn, Tylenol and Duoneb treatments and cultures were sent to r/o infectious etiology.  pt is well known to me with hx of htn, tracheomalacia with multiple admission with l lung collapse, htn, cad, chf diastolic dysfunction with perforated appendicitis.  sob ?to lung collapse increase o2 / neb  ct chest no contrast  continue po Cardizem  MODERATE AS, continue to follow, no need to repeat echo  pt with hx of ASHD/ MI, ?asa daily, pt with hx of GI bleed on the last admission  dvt prophylaxis  continue Bipap at night  check le venous doppler, negative  ct chest noted, + metastatic disease/ onc noted discussed with son  monitor bp  dvt prophylaxis  awaiting mri

## 2022-09-07 NOTE — DISCHARGE NOTE NURSING/CASE MANAGEMENT/SOCIAL WORK - NSDCPEFALRISK_GEN_ALL_CORE
For information on Fall & Injury Prevention, visit: [email or web address] OR  [email or web address] OR  https://www.cdc.gov/steadi/patient.html

## 2022-09-07 NOTE — PROGRESS NOTE ADULT - ASSESSMENT
Pt is an 87yo F w/ PMH of severe tracheobronchomalacia, HTN, HLD, CAD, CVA w/ residual L sided weakness, Intrinsic lung disease, aortic stenosis, R breast CA s/p mastectomy, L lung collapse and MRSE p/w dyspnea from rehab likely i/s/o PNA vs atelectasis d/t mucus plugging all i/s/o tracheobronchomalacia.  Stable.  Patient to be d/c'ed 9/6 to [facility].

## 2022-09-07 NOTE — PHYSICAL THERAPY INITIAL EVALUATION ADULT - CRITERIA FOR SKILLED THERAPEUTIC INTERVENTIONS
pt is dependent w/ all ADL;s and functional mobility / return to LTC/impairments found/anticipated discharge recommendation

## 2022-09-07 NOTE — PHYSICAL THERAPY INITIAL EVALUATION ADULT - PERTINENT HX OF CURRENT PROBLEM, REHAB EVAL
87 y/o  F w/ PMH of severe tracheobronchomalacia, HTN, HLD, CAD, CVA w/ residual L sided weakness, Intrinsic lung disease, Aortic stenosis, R breast CA s/p mastectomy, + met lung cancer, L lung collapse and MRSE p/w dyspnea from rehab likely i/s/o PNA vs atelectasis d/t mucus plugging all i/s/o tracheobronchomalacia.  CT a/P  multiple lytic leisions in axial and appendicular skeleton,  increased in size from Ct a/P for 3/2022, LUE (-) DVT, B/L LE's (-) DVT, CXR dependent atelectasis,  multiple lytic bone leisions in T/S vertebral bodies,  xray humerus, osteopenia, lytic leisons in proximal diaphysis

## 2022-09-07 NOTE — PROGRESS NOTE ADULT - ASSESSMENT
89    year old female    h/o hemorrhagic CVA, has  PEG,  HTN, MI,   HLD, gout, right ca  breast   right Ca breast. s/p mastectomy in 2019,  s/p  sentinel node  localization.  4/4,  were  negative  peg dislodged.  IR   replacements  on 1/3/22  s/p rrt  . in past,  for hypoxia. cxr with complete  collapsed  of  left lung, needing broch,   s/ p  bronchoscopy , pt  with  tracheomalacia  and  collapsed  airways,  makes  this a  difficult  situation, as there is no good rx for this     h/o bacteremia. on   pna, perforated  appendicitis,  ?  mets  to  acetabulum, was  seen by dr pacheco   surg/ IR  and  oncology eval dr pacheco   sa w pt.  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 this will not alter rx. pt  will need   extended  course  of ab   on iv  vanco and ertapenem.  till  2/9/.22  ID dr reagan, and per  surg  and  IR  eval,  no intervention   CT  1/20/22, no PE. collection in right side   s/p  rectal bleed.        *  admitted with sob    ENT eval w/ laryngoscopy notable for vocal cords mobile and intact, no edema, upper airway patent   Duonebs and hypertonic saline for airway clearance    Zosyn empiric for pna    *   s/p cva, has  PEG,  npo  ,   on  synthroid, Keppra  ,  *  HTN, on meds  per  card  *  h/o  ca breast. /, s/p  R  mastectomy  *   obesity, bmi  was   41, now  is  30       c/c left  leg contracture ,  remains  bed  bound. functional  paraplegias  needs  assistance  for  all her  adl's   *  pt  with  h/o  tracheomalacia  and  collapsed  airways,      given pt;s  mlple co morbidities,  pt is  at risk for  re admissions     on nocturnal  bipap    difficult  situation, a s there  is no  good  rx  for  pt's  recurrent lung collapse hypoxia    h/o  of  chronic    mild  tachycardia   re  admission  likely,, given her  airways, and  propensity  for  lung collapse   pt  is  oxygen dependent /  now  on N/ C     G  tube  replacement   by IR/    Feest  by  swallow team    *   CT chest. lytic  lesions, T  spine/  ribs/ humerus/  oncoloigy  magnus pacheco.  suspect mets  from ?    ca breast/  h/o  mastectomy       called son. Sy, updated/ oncologist  has  also  discussed  with  son,  futility of  pursuing  bx. a s pt  is  not an ideal  candidate  for  chemo    CT  A.p ,/ prelim ,  pelvic  mets  per  oncology,   suspect  metastatic ca  breast    awiating  mri  spine      per  son, pt is  full code                    89    year old female    h/o hemorrhagic CVA, has  PEG,  HTN, MI,   HLD, gout, right ca  breast   right Ca breast. s/p mastectomy in 2019,  s/p  sentinel node  localization.  4/4,  were  negative  peg dislodged.  IR   replacements  on 1/3/22  s/p rrt  . in past,  for hypoxia. cxr with complete  collapsed  of  left lung, needing broch,   s/ p  bronchoscopy , pt  with  tracheomalacia  and  collapsed  airways,  makes  this a  difficult  situation, as there is no good rx for this     h/o bacteremia. on   pna, perforated  appendicitis,  ?  mets  to  acetabulum, was  seen by dr pacheco   surg/ IR  and  oncology eval dr pacheco   sa w pt.  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 this will not alter rx. pt  will need   extended  course  of ab   on iv  vanco and ertapenem.  till  2/9/.22  ID dr reagan, and per  surg  and  IR  eval,  no intervention   CT  1/20/22, no PE. collection in right side   s/p  rectal bleed.        *  admitted with sob    ENT eval w/ laryngoscopy notable for vocal cords mobile and intact, no edema, upper airway patent   Duonebs and hypertonic saline for airway clearance    Zosyn empiric for pna    *   s/p cva, has  PEG,  npo  ,   on  synthroid, Keppra  ,  *  HTN, on meds  per  card  *  h/o  ca breast. /, s/p  R  mastectomy  *   obesity, bmi  was   41, now  is  32       c/c left  leg contracture ,  remains  bed  bound. functional  paraplegias  needs  assistance  for  all her  adl's   *  pt  with  h/o  tracheomalacia  and  collapsed  airways,      given pt;s  mlple co morbidities,  pt is  at risk for  re admissions     on nocturnal  bipap    difficult  situation, a s there  is no  good  rx  for  pt's  recurrent lung collapse hypoxia    h/o  of  chronic    mild  tachycardia   re  admission  likely,, given her  airways, and  propensity  for  lung collapse   pt  is  oxygen dependent /  now  on N/ C     G  tube  replacement   by IR/    Feest  by  swallow team    *   CT chest. lytic  lesions, T  spine/  ribs/ humerus/  oncoloigy  magnus pacheco.  suspect mets  from ?    ca breast/  h/o  mastectomy       called son. Sy, updated/ oncologist  has  also  discussed  with  son,  futility of  pursuing  bx. a s pt  is  not an ideal  candidate  for  chemo    CT  A.p ,/ prelim ,  pelvic  mets  per  oncology,   suspect  metastatic ca  breast    was  awaiting   mri  spine/  pt unable  to tolerate  mri   per oncology, no  further  intervention/  and  to  start  Arimidex  per d r ohri   start   d/c  planning to  reghab      per  son, pt is  full code

## 2022-09-07 NOTE — PHYSICAL THERAPY INITIAL EVALUATION ADULT - GENERAL OBSERVATIONS, REHAB EVAL
Rec'd in Rec'd in bed, on air tap, + masimo, + PEG. c/o 8/10 pain in LLE, h/o CVA w/ weakness in LLE

## 2022-09-07 NOTE — DISCHARGE NOTE NURSING/CASE MANAGEMENT/SOCIAL WORK - PATIENT PORTAL LINK FT
You can access the FollowMyHealth Patient Portal offered by [facility] by registering at the following website: [email or web address]. By joining [employer]’s FollowMyHealth portal, you will also be able to view your health information using other applications (apps) compatible with our system.

## 2022-09-07 NOTE — PROGRESS NOTE ADULT - PROBLEM SELECTOR PROBLEM 1
Tracheobronchomalacia
Leukocytosis

## 2022-09-07 NOTE — CHART NOTE - NSCHARTNOTEFT_GEN_A_CORE
Notified by nurse that patient unable to tolerate MRI due to SOB while laying flat.  Will not continue to pursue MRI at this time.

## 2022-09-08 PROBLEM — E78.5 HYPERLIPIDEMIA, UNSPECIFIED: Chronic | Status: ACTIVE | Noted: 2022-01-01

## 2022-09-08 PROBLEM — I10 ESSENTIAL (PRIMARY) HYPERTENSION: Chronic | Status: ACTIVE | Noted: 2022-01-01

## 2022-09-08 PROBLEM — J39.8 OTHER SPECIFIED DISEASES OF UPPER RESPIRATORY TRACT: Chronic | Status: ACTIVE | Noted: 2022-01-01

## 2022-09-08 NOTE — H&P ADULT - PROBLEM SELECTOR PLAN 7
- c/w home diltiazem (appears to have been stopped during last admission for unclear reason, but Cardiology notes stated to continue)  - f/u Cardiology consult in AM

## 2022-09-08 NOTE — H&P ADULT - PROBLEM SELECTOR PLAN 10
- DVT ppx: lovenox  - Diet: NPO with TFs via PEG (was recently started on PO pleasure feeds with pureed and moderately thickened liquids but holding for now due to BIPAP use)  - Dispo: likely back to rehab pending improvement

## 2022-09-08 NOTE — H&P ADULT - PROBLEM SELECTOR PLAN 5
Hx of CAD s/p MI with preserved LVEF  - EKG (9/8): sinus tach w/ PACs and 1st degree AV block, , QTc 451, no significant ST elevations or depressions  - not on AC or ASA outpatient  - f/u Cardiology consult in AM

## 2022-09-08 NOTE — CONSULT NOTE ADULT - SUBJECTIVE AND OBJECTIVE BOX
CC: SOB     HPI: 86yo F w/ PMH of severe tracheobronchomalacia, HTN, HLD, CAD, CVA w/ residual L sided weakness, Intrinsic lung disease, Aortic stenosis, R breast CA s/p mastectomy, L lung collapse and MRSE p/w dyspnea from rehab. She has a previous hx of resp failure d/t mucus plugging w/ complete L lung collapse and severe tracheobronchomalacia requiring hospitalization at [facility] in March 2022. After discharge to rehab pt was maintained on BiPAP for about one month's time during the evening hours but was subsequently taken off. She was reportedly doing well until she started complaining of an inability to breath and having increased WOB prompting her ED visit 8/28/22. She remained admitted until today where she was discharged. Now arrives in ED again complain of SOB. Patient unable to provide additional history         PAST MEDICAL & SURGICAL HISTORY:  MI (myocardial infarction)      Personal history of asbestosis      Gout      UTI (lower urinary tract infection)      ETOH abuse      CVA (cerebral vascular accident)      Tracheobronchomalacia      HTN (hypertension)      HLD (hyperlipidemia)      History of left shoulder fracture      History of benign breast tumor        Allergies    Toradol (Urticaria (Mild to Mod); Rash (Mild to Mod))    Intolerances      MEDICATIONS  (STANDING):  albuterol/ipratropium for Nebulization. 3 milliLiter(s) Nebulizer once  albuterol/ipratropium for Nebulization. 3 milliLiter(s) Nebulizer once  cefepime   IVPB 1000 milliGRAM(s) IV Intermittent once  vancomycin  IVPB. 1000 milliGRAM(s) IV Intermittent once    MEDICATIONS  (PRN):      Social History: unable to provide     Family history: unable to provide     ROS: unable to provide     Vital Signs Last 24 Hrs  T(C): 36.8 (08 Sep 2022 13:28), Max: 37.1 (08 Sep 2022 05:00)  T(F): 98.3 (08 Sep 2022 13:28), Max: 98.7 (08 Sep 2022 05:00)  HR: 100 (08 Sep 2022 20:40) (100 - 111)  BP: 115/69 (08 Sep 2022 13:28) (108/54 - 115/69)  BP(mean): --  RR: 18 (08 Sep 2022 13:28) (18 - 18)  SpO2: 110% (08 Sep 2022 20:40) (91% - 110%)                              8.3    11.07 )-----------( 245      ( 08 Sep 2022 20:56 )             27.3    09-08    135  |  96  |  27<H>  ----------------------------<  142<H>  4.9   |  27  |  0.68    Ca    9.8      08 Sep 2022 20:56  Mg     2.1     09-08    TPro  7.3  /  Alb  3.8  /  TBili  0.4  /  DBili  x   /  AST  18  /  ALT  13  /  AlkPhos  176<H>  09-08   PT/INR - ( 08 Sep 2022 20:56 )   PT: 13.0 sec;   INR: 1.13 ratio         PTT - ( 08 Sep 2022 20:56 )  PTT:31.9 sec    PHYSICAL EXAM:  Gen: NAD  Skin: No rashes, bruises, or lesions  Head: Normocephalic, Atraumatic  Face: no edema, erythema, or fluctuance. Parotid glands soft without mass  Eyes: no scleral injection  Nose: Nares bilaterally patent, no discharge  Mouth: poor dentition, Mucosa moist, tongue/uvula midline, oropharynx clear  Neck: Flat, supple, no lymphadenopathy, trachea midline, no masses  Lymphatic: No lymphadenopathy  Resp: breathing easily, no stridor  CV: no peripheral edema/cyanosis  GI: nondistended   Peripheral vascular: no JVD or edema  Neuro: facial nerve intact, no facial droop      Fiberoptic Indirect laryngoscopy:  (Scope #2 used)    Reason for Laryngoscopy:    Patient was unable to cooperate with mirror.  Nasopharynx, oropharynx, and hypopharynx clear, no bleeding. Tongue base, posterior pharyngeal wall, vallecula, epiglottis,appear normal. No erythema, edema, masses or lesions. Airway patent, no foreign body visualized. No glottic/supraglottic edema. True vocal cords, arytenoids, vestibular folds, ventricles, pyriform sinuses, and aryepiglottic folds appear normal bilaterally. Unable to access Vocal cords as pt unable to tolerate scope

## 2022-09-08 NOTE — ED PROVIDER NOTE - PHYSICAL EXAMINATION
Gen: WDWN, NAD  HEENT: EOMI, no nasal discharge, mucous membranes moist  CV: RRR, 2+ radial pulses R radial  Resp: + diffuse wheezing b/l lung fields, no accessory muscle use, no increased work of breathing  GI: Abdomen soft non-distended, NTTP  MSK: No open wounds, no bruising, no LE edema  Neuro: A&Ox4, following commands, moving all four extremities spontaneously  Psych: appropriate mood

## 2022-09-08 NOTE — H&P ADULT - ASSESSMENT
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

## 2022-09-08 NOTE — ED ADULT NURSE REASSESSMENT NOTE - NS ED NURSE REASSESS COMMENT FT1
Pt transferred to hospital bed, placed on airtap sheet, placed into position of comfort. pt remains on bipap at this time.

## 2022-09-08 NOTE — ED ADULT NURSE NOTE - NSIMPLEMENTINTERV_GEN_ALL_ED
Implemented All Fall with Harm Risk Interventions:  [place] to call system. Call bell, personal items and telephone within reach. Instruct patient to call for assistance. Room bathroom lighting operational. Non-slip footwear when patient is off stretcher. Physically safe environment: no spills, clutter or unnecessary equipment. Stretcher in lowest position, wheels locked, appropriate side rails in place. Provide visual cue, wrist band, yellow gown, etc. Monitor gait and stability. Monitor for mental status changes and reorient to person, place, and time. Review medications for side effects contributing to fall risk. Reinforce activity limits and safety measures with patient and family. Provide visual clues: red socks.

## 2022-09-08 NOTE — H&P ADULT - NSHPSOURCEINFORD_GEN_ALL_CORE
4/13/2017          To Whom It May Concern:    Dayana Colon is currently under my medical care and may not return to work at this time. Please excuse Aleida Isaacs until 05/05/2017. If you require additional information please contact our office.         Sincere Chart(s)/Patient

## 2022-09-08 NOTE — H&P ADULT - PROBLEM SELECTOR PLAN 1
Pt with severe tracheobroncomalacia c/b previous episodes of hypoxia 2/2 mucous plugging and recurrent L lung collapse. Presenting this admission from rehab for SOB with wheezing shortly after discharge. CXR (9/8) showed low lung volume, patchy atelectasis in LLL, no obvious focal consolidations. Suspect SOB likely 2/2 being off BIPAP/possibly missing doses of meds vs aspiration PNA/pneumonitis (recently started on PO pleasure feeds) vs mucous plugging/worsening atelectasis.  - in ED, s/p solumedrol 125mg IVP x1, 2.25% racepinephrine 0.5mL x1, duoneb x2, vanc/cefepime x1 dose  - c/w BIPAP, use nightly and PRN  - c/w airway clearance and supportive care (standing duonebs, standing guaifenesin, standing inhaled hypertonic saline)  - recently completed 5-day course of Zosyn (8/28-9/1), monitoring off abx for now given lower suspicion of PNA at this time  - Appreciate ENT recs: no acute ENT intervention indicated; laryngoscopy difficult as pt unable to tolerate scope but able to visualize patent airway with no edema of larynx/vocal cords.  - if persistent symptoms or clinically worsening, consider CTA chest to r/o PE given pt is high risk for DVTs at baseline  - f/u Pulmonary consult in AM

## 2022-09-08 NOTE — ED PROVIDER NOTE - CLINICAL SUMMARY MEDICAL DECISION MAKING FREE TEXT BOX
Malorie Spain MD, PGY-3: 86YO F hx tracheomalacia, HTN, MI, p/w SOB, increased work of breathing, diffuse wheezing b/l lung fields. consider tracheal/vocal cord edema or stenosis, consider COPD exacerbation. plan for basic labs, bipap, cxr, admission for new O2 requirement.

## 2022-09-08 NOTE — H&P ADULT - PROBLEM SELECTOR PLAN 3
Hx of R breast cancer (ER, [place] positive and Her-2 negative) s/p mastectomy (2019) c/b likely mets to bone. Last admission attempted further malignancy workup with MRI spine given lytic bone lesions seen on CT chest, but pt was unable to tolerate MRI.  - Per Oncology (Dr. Schultz) last admission, not possible to pursue further workup, c/w anastrozole for likely metastatic breast cancer

## 2022-09-08 NOTE — PROGRESS NOTE ADULT - ASSESSMENT
ASSESSMENT:    Asked by the medical housestaff to evaluate the patient in pulmonary consultation. The patient is a 87 year old gentlewoman, lifelong non-smoker, well known to me without history of intrinsic lung disease. 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 She also has a history of HTN, HLD, CAD s/p MI with preserved LVEF and moderate aortic stenosis. The patient was admitted to [facility] in December 2021 with fever and abdominal pain. She was found to have perforated appendicitis with abscess formation. She was treated with tube drainage and antibiotics for a polymicrobial infection. Hospital course was complicated by respiratory failure due to mucous plugging with complete collapse of the left lung. She underwent several bronchoscopies for pulmonary toilet and was found to have severe tracheobronchomalacia. She was treated with bronchodilators, mucolytic nebs, chest vest and nocturnal AVAPS with expansion of the left upper lobe and some reexpansion of the left lower lobe. She was admitted in March with hematochezia with a surprisingly stable respiratory status. The left lower lobe was well aerated on a CT scan of the abdomen and pelvis with only bibasilar subsegmental atelectasis - there was scattered sigmoid diverticulosis without evidence of diverticulitis - interval decrease in size of a fluid collection in the region of the previously perforated appendicitis measuring 2.8 x 1.5 cm previously measuring 4.0 x 2.2 cm - slight increase in mild adjacent inflammatory change. The GI bleed was treated conservatively.  She has been doing "well" at rehab although she remains bedbound and NPO. She is no longer wearing nocturnal NIV.  She returns with shortness of breath in the setting of back, neck and left lower extremity pain. ENT evaluation -> no upper airway pathology. The patient had been placed on BIPAP for mild acute hypercapnic respiratory failure that has resolved and to buttress open the airways. She is awake and alert. She currently has no shortness of breath or hypoxemia on room air. No cough, sputum production, hemoptysis, chest congestion or wheeze. No fevers, chills or sweats. No chest pain/pressure or palpitations.     the patient returns from [facility] rehab with dyspnea with mild hypercapnic respiratory failure in the setting of neck, back and left leg pain (chronically contracted) - the respiratory acidosis has resolved with NIV support and the patient is now without shortness of breath or hypoxemia on room air - the patient has severe tracheobronchomalacia that has resulted in mucous plugging with complete left lung collapse requiring several bronchoscopies for pulmonary toilet - the patient was felt not to be a candidate for surgery for the tracheobronchomalacia and stenting was felt to have more risk than benefit - she has not had significant mucous plugging for ~ 6 months - CXR reveals a poor inspiratory effort and subsegmental left lower lobe atelectasis - it is unlikely that she has pneumonia despite the mild leukocytosis    9/8 - quite anxious awaiting discharge to [facility]; somewhat short of breath with chest congestion and wheeze; no cough or sputum production; making a grunting noise; mental status seems at baseline; no fevers, chills or sweats; no chest pain/pressure or palpitations; being observed off antibiotics; started on anastrozole by oncology; continues on a puree diet with moderately thickened fluids in addition to full PEG feeds    PLAN/RECOMMENDATIONS:    stable oxygenation on room air  VBG -> 7.31/51/45/26/73.4 -> mild respiratory and metabolic acidosis  no longer uses BIPAP for sleep   observe off antibiotics  CT scan -> bilateral dependent atelectasis - resolution of left lower lobe complete collapse - multiple lytic bone lesions predominantly in the thoracic vertebral bodies which is concerning for metastatic disease   no indication for bronchoscopy at this time  CT scan -> multiple lytic lesions are seen throughout the axial and appendicular skeleton, some of which appear increased in size from CT abdomen pelvis 3/16/2022 when evaluated in retrospect - a few lytic lesions involve the left intertrochanteric region and right acetabulum  oncology follow-up    it is likely that the patient has metastatic breast cancer    started on anastrozole   incentive spirometry  acapella device  albuterol/atrovent nebs q6h  hypertonic saline nebs q12h  guaifenesin 300mg 4 times daily via PEG  puree diet with moderately thickened fluids - PEG feeds  hyponatremia has resolved  treatment of HTN, HLD, CAD, aortic stenosis per cardiology  DVT prophylaxis  analgesics    Will follow with you. Plan of care discussed with the patient and her RN at bedside. Would observe the patient until her anxiety wanes to see if her respiratory status improves    Kennedy Marcano MD, [facility]  [phone]  Pulmonary Medicine

## 2022-09-08 NOTE — PROGRESS NOTE ADULT - SUBJECTIVE AND OBJECTIVE BOX
CARDIOLOGY     PROGRESS  NOTE   ________________________________________________    CHIEF COMPLAINT:Patient is a 87y old  Female who presents with a chief complaint of SOB (08 Sep 2022 06:40)  no complain.  	  REVIEW OF SYSTEMS:  CONSTITUTIONAL: No fever, weight loss, or fatigue  EYES: No eye pain, visual disturbances, or discharge  ENT:  No difficulty hearing, tinnitus, vertigo; No sinus or throat pain  NECK: No pain or stiffness  RESPIRATORY: No cough, wheezing, chills or hemoptysis; No Shortness of Breath  CARDIOVASCULAR: No chest pain, palpitations, passing out, dizziness, or leg swelling  GASTROINTESTINAL: No abdominal or epigastric pain. No nausea, vomiting, or hematemesis; No diarrhea or constipation. No melena or hematochezia.  GENITOURINARY: No dysuria, frequency, hematuria, or incontinence  NEUROLOGICAL: No headaches, memory loss, loss of strength, numbness, or tremors  SKIN: No itching, burning, rashes, or lesions   LYMPH Nodes: No enlarged glands  ENDOCRINE: No heat or cold intolerance; No hair loss  MUSCULOSKELETAL: No joint pain or swelling; No muscle, back, or extremity pain  PSYCHIATRIC: No depression, anxiety, mood swings, or difficulty sleeping  HEME/LYMPH: No easy bruising, or bleeding gums  ALLERGY AND IMMUNOLOGIC: No hives or eczema	    [ ] All others negative	  [ ] Unable to obtain    PHYSICAL EXAM:  T(C): 37.1 (09-08-22 @ 05:00), Max: 37.1 (09-07-22 @ 20:43)  HR: 101 (09-08-22 @ 05:00) (100 - 103)  BP: 108/54 (09-08-22 @ 05:00) (92/61 - 135/72)  RR: 18 (09-08-22 @ 05:00) (18 - 18)  SpO2: 91% (09-08-22 @ 05:00) (91% - 99%)  Wt(kg): --  I&O's Summary    07 Sep 2022 07:01  -  08 Sep 2022 07:00  --------------------------------------------------------  IN: 1380 mL / OUT: 700 mL / NET: 680 mL        Appearance: Normal	  HEENT:   Normal oral mucosa, PERRL, EOMI	  Lymphatic: No lymphadenopathy  Cardiovascular: Normal S1 S2, No JVD, + murmurs, No edema  Respiratory:rhonchi  Psychiatry: A & O x 3, Mood & affect appropriate  Gastrointestinal:  Soft, Non-tender, + BS	  Skin: No rashes, No ecchymoses, No cyanosis	  Neurologic: Non-focal  Extremities: Normal range of motion, No clubbing, cyanosis or edema  Vascular: Peripheral pulses palpable 2+ bilaterally    MEDICATIONS  (STANDING):  albuterol/ipratropium for Nebulization 3 milliLiter(s) Nebulizer every 6 hours  allopurinol 100 milliGRAM(s) Oral daily  anastrozole 1 milliGRAM(s) Oral daily  atorvastatin 20 milliGRAM(s) Oral at bedtime  cyanocobalamin 1000 MICROGram(s) Oral daily  enoxaparin Injectable 40 milliGRAM(s) SubCutaneous every 24 hours  escitalopram 10 milliGRAM(s) Oral daily  guaiFENesin Oral Liquid (Sugar-Free) 300 milliGRAM(s) Oral every 6 hours  levETIRAcetam  Solution 750 milliGRAM(s) Oral two times a day  levothyroxine 75 MICROGram(s) Oral daily  melatonin 3 milliGRAM(s) Oral at bedtime  psyllium Powder 1 Packet(s) Oral daily  sodium chloride 3%  Inhalation 4 milliLiter(s) Inhalation every 12 hours      TELEMETRY: 	    ECG:  	  RADIOLOGY:  OTHER: 	  	  LABS:	 	    CARDIAC MARKERS:                  proBNP: Serum Pro-Brain Natriuretic Peptide: 155 pg/mL (08-27 @ 21:06)    Lipid Profile:   HgA1c:   TSH:         Assessment and plan  ---------------------------  Pt is an 85yo F w/ PMH of severe tracheobronchomalacia, HTN, HLD, CAD, CVA w/ residual L sided weakness, Intrinsic lung disease, Aortic stenosis, R breast CA s/p mastectomy, L lung collapse and MRSE p/w dyspnea from rehab.   Unable to obtain further history from patient d/t dyspnea and use of CPAP. Remained of history provided by chart review and collateral from pt's son.  She has a previous hx of resp failure d/t mucus plugging w/ complete L lung collapse and severe tracheobronchomalacia requiring hospitalization at [facility] in March 2022. After discharge to rehab pt was maintained on BiPAP for about one month's time during the evening hours but was subsequently taken off. She was reportedly doing well until until started complaining of an inability to breath and having increased WOB prompting her ED visit.   In the ED she was noted to be tachycardic and tachypneic w/ labs significant for a leukocytosis and positive UA. CXR w/ LLL opacity vs atelectasis. She was administered Vanc, Zosyn, Tylenol and Duoneb treatments and cultures were sent to r/o infectious etiology.  pt is well known to me with hx of htn, tracheomalacia with multiple admission with l lung collapse, htn, cad, chf diastolic dysfunction with perforated appendicitis.  sob ?to lung collapse increase o2 / neb  ct chest no contrast  continue po Cardizem  MODERATE AS, continue to follow, no need to repeat echo  pt with hx of ASHD/ MI, ?asa daily, pt with hx of GI bleed on the last admission  dvt prophylaxis  continue Bipap at night  check le venous doppler, negative  ct chest noted, + metastatic disease/ onc noted discussed with son  monitor bp  dvt prophylaxis  pt with high risk for dvt , nees dvt prophylaxis at all time ?switch to Xarelto 10 mg po daily

## 2022-09-08 NOTE — PROGRESS NOTE ADULT - SUBJECTIVE AND OBJECTIVE BOX
[facility] - PROGRESS NOTE    CHIEF COMPLAINT: chronic hypoxic respiratory failure; acute hypercapnic respiratory failure; mucous plugging; atelectasis; weak cough; dysphagia; tracheobronchomalacia; h/o 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;     INTERVAL HISTORY: quite anxious awaiting discharge to [facility]; somewhat short of breath with chest congestion and wheeze; no cough or sputum production; making a grunting noise; mental status seems at baseline; no fevers, chills or sweats; no chest pain/pressure or palpitations; being observed off antibiotics; started on anastrozole by oncology; continues on a puree diet with moderately thickened fluids in addition to full PEG feeds;    REVIEW OF SYSTEMS:  Constitutional: As per interval history  HEENT: Within normal limits  CV: As per interval history  Resp: As per interval history  GI: dysphagia  : Within normal limits  Musculoskeletal: Within normal limits  Skin: Within normal limits  Neurological: CVA - left sided plegia  Psychiatric: Within normal limits  Endocrine: Within normal limits  Hematologic/Lymphatic: Within normal limits  Allergic/Immunologic: Within normal limits    MEDICATIONS:     Pulmonary "  albuterol/ipratropium for Nebulization 3 milliLiter(s) Nebulizer every 6 hours  guaiFENesin Oral Liquid (Sugar-Free) 300 milliGRAM(s) Oral every 6 hours  sodium chloride 3%  Inhalation 4 milliLiter(s) Inhalation every 12 hours    Anti-microbials:    Cardiovascular:    Other:  allopurinol 100 milliGRAM(s) Oral daily  anastrozole 1 milliGRAM(s) Oral daily  atorvastatin 20 milliGRAM(s) Oral at bedtime  cyanocobalamin 1000 MICROGram(s) Oral daily  enoxaparin Injectable 40 milliGRAM(s) SubCutaneous every 24 hours  escitalopram 10 milliGRAM(s) Oral daily  levETIRAcetam  Solution 750 milliGRAM(s) Oral two times a day  levothyroxine 75 MICROGram(s) Oral daily  melatonin 3 milliGRAM(s) Oral at bedtime  psyllium Powder 1 Packet(s) Oral daily    MEDICATIONS  (PRN):  traMADol 50 milliGRAM(s) Oral two times a day PRN Severe Pain (7 - 10)        OBJECTIVE:    PHYSICAL EXAM:       ICU Vital Signs Last 24 Hrs  T(C): 36.8 (08 Sep 2022 13:28), Max: 37.1 (07 Sep 2022 20:43)  T(F): 98.3 (08 Sep 2022 13:28), Max: 98.8 (07 Sep 2022 20:43)  HR: 111 (08 Sep 2022 13:28) (101 - 111)  BP: 115/69 (08 Sep 2022 13:28) (108/54 - 125/55)  BP(mean): --  ABP: --  ABP(mean): --  RR: 18 (08 Sep 2022 13:28) (18 - 18)  SpO2: 93% (08 Sep 2022 13:28) (91% - 93%) on room air     General: Awake and alert. Cooperative. Tachypneic. Appears stated age. Bedbound. Grunting.  HEENT: Atraumatic. Normocephalic. Anicteric. Normal oral mucosa. PERRL. EOMI. Mallampati class IV airway.  Neck: Supple. Trachea midline. Thyroid without enlargement/tenderness/nodules. No carotid bruit. No JVD. Short and wide.  Cardiovascular: Regular rate and rhythm. S1 S2 normal. III/VI systolic murmur  Respiratory: Respirations unlabored. Diffuse course breath sounds. Kyphosis. Poor chest wall excursion  Abdomen: Soft. Non-tender. Non-distended. No organomegaly. No masses. Normal bowel sounds. Obese. PEG.  Extremities: Warm to touch. No clubbing or cyanosis. No pedal edema. Large legs. Left leg contracted under the right leg  Pulses: 2+ peripheral pulses all extremities.	  Skin: Normal skin color. No rashes or lesions. No ecchymoses. No cyanosis. Warm to touch.  Lymph Nodes: Cervical, supraclavicular and axillary nodes normal  Neurological: Left lower extremity plegia with contraction. Left upper extremity is quite weak. A and O x 3  Psychiatry: Anxious.    LABS:      CBC    WBC  6.00 <==, 7.53 <==    Hemoglobin  8.2 <<==, 8.8 <<==    Hematocrit  27.9 <==, 29.1 <==    Platelets  227 <==, 230 <==      136  |  98  |  20  ----------------------------<  115<H>    09-05  5.1   |  29  |  0.54      LYTES    sodium  136 <==, 140 <==    potassium   5.1 <==, 3.6 <==    chloride  98 <==, 103 <==    carbon dioxide  29 <==, 23 <==    =============================================================================================  RENAL FUNCTION:    Creatinine:   0.54  <<==, 0.65  <<==    BUN:   20 <==, 15 <==    ============================================================================================    calcium   9.4 <==, 9.2 <==    phos   3.5 <==, 2.4 <==    mag   1.8 <==, 1.7 <==    ============================================================================================  LFTs    AST:   18 <==     ALT:  14  <==     AP:  118  <=    Bili:  0.2  <=    PT/INR - ( 30 Aug 2022 12:12 )   PT: 13.8 sec;   INR: 1.20 ratio      Venous Blood Gas:   @ 09:03  7.31/51/45/26/73.4  VBG Lactate: --    Venous Blood Gas:   @ 22:32  7.36/47/48/27/79.0  VBG Lactate: --    Venous Blood Gas:   @ 20:50  7.31/48/57/24/86.9  VBG Lactate: 2.5    PT/INR - ( 30 Aug 2022 12:12 )   PT: 13.8 sec;   INR: 1.20 ratio       PTT - ( 30 Aug 2022 12:12 )  PTT:32.1 sec    Serum Pro-Brain Natriuretic Peptide: 155 pg/mL ( @ 21:06)    CARDIAC MARKERS ( 27 Aug 2022 21:06 )  CPK x     /CKMB x     /CKMB Units x        troponin 21 ng/L    < from: TTE with Doppler (w/Cont) (22 @ 14:08) >    Patient name: FRANKI MEHTA  YOB: 1935   Age: 86 (F)   MR#: [id number]  Study Date: 2022  ------------------------------------------------------------------------  Dimensions:    Normal Values:  LA:     2.5    2.0 - 4.0 cm  Ao:     3.3    2.0 - 3.8 cm  SEPTUM: 1.6    0.6 - 1.2 cm  PWT:    0.9    0.6 - 1.1 cm  LVIDd:  3.6    3.0 - 5.6 cm  LVIDs:  2.5    1.8 - 4.0 cm  Derived variables:  LVMI: 77 g/m2  RWT: 0.50  Fractional short: 31 %  EF (Visual Estimate): 70-75 %  ------------------------------------------------------------------------  Conclusions:  1. Concentric left ventricular hypertrophy.  2. Hyperdynamic left ventricular systolic function.  Endocardial visualization enhanced with intravenous  injection of Ultrasonic Enhancing Agent (Definity).  3. The right ventricle is not well visualized; grossly  normal right ventricular systolic function.  Pt refused to proceed with exam.  Limited Doppler study.  No trans aortic gradients.  Unable to rule out endocarditis.  ------------------------------------------------------------------------  Confirmed on 2022 - 15:42:57 by COMPA Castillo  ------------------------------------------------------------------------  ---------------------------------------------------------------------------------------------------------------  MICROBIOLOGY:     Respiratory Viral Panel with COVID-19 by RYAN (22 @ 21:05)   Rapid R[facility] Result: [facility]   SARS-CoV-2: [facility]  This Respiratory Panel uses polymerase chain reaction (PCR) to detect for   adenovirus; coronavirus (HKU1, NL63, 229E, OC43); human metapneumovirus   (hMPV); human enterovirus/rhinovirus (Entero/RV); influenza A; influenza   A/H1; influenza A/H3; influenza A/H1-2009; influenza B; parainfluenza   viruses 1, 2, 3, 4; respiratory syncytial virus; Mycoplasma pneumoniae;   Chlamydophila pneumoniae; and SARS-CoV-2.     Urinalysis Basic - ( 27 Aug 2022 22:42 )    Color: Light Orange / Appearance: Turbid / S.013 / pH: x  Gluc: x / Ketone: Negative  / Bili: Negative / Urobili: Negative   Blood: x / Protein: 30 mg/dL / Nitrite: Negative   Leuk Esterase: Large / RBC: 8 /hpf / WBC 1145 /HPF   Sq Epi: x / Non Sq Epi: 1 /hpf / Bacteria: Moderate    Culture - Urine (22 @ 22:42)   - Nitrofurantoin: I 64 Should not be used to treat pyelonephritis.   - Tetra/Doxy: R >8   - Vancomycin: S 1   - Ampicillin: R >8 Predicts results to ampicillin/sulbactam, amoxacillin-clavulanate and piperacillin-tazobactam.   - Ciprofloxacin: R >2   - Levofloxacin: R >4   Specimen Source: Clean Catch Clean Catch (Midstream)   Culture Results:   >100,000 CFU/ml Enterococcus faecium   <10,000 CFU/ml Normal Urogenital ck present     Historical Values  Culture - Urine (22 @ 22:42)   Specimen Source: Clean Catch Clean Catch (Midstream)   Culture Results:   >100,000 CFU/ml Enterococcus faecium   <10,000 CFU/ml Normal Urogenital ck present   Organism Identification: Enterococcus faecium     Culture - Blood (22 @ 20:30)   Specimen Source: .Blood Blood-Peripheral   Culture Results:   No growth to date.     Culture - Blood (22 @ 20:15)   Specimen Source: .Blood Blood-Peripheral   Culture Results:   No growth to date.     MRSA/MSSA PCR (22 @ 05:23)   MRSA PCR Result.: NotDetec  Staph aureus PCR Result: NotDetec     RADIOLOGY:  [x ] Chest radiographs reviewed and interpreted by me    EXAM:  CT CHEST                          PROCEDURE DATE:  2022      FINDINGS:    LYMPH NODES: No mediastinal lymphadenopathy.    HEART/VASCULATURE: The heart is normal in size. No pericardial effusion.   Aortic valve calcifications noted.    AIRWAYS/LUNGS/PLEURA: Patent central airways. Right lower lobe dependent   atelectasis appear similar to prior. Patchy groundglass opacities the   right apex are improved. Interval resolution of left lower lobe complete   collapse. There is mild dependent atelectasis of the left upper and lower   lobes.    UPPER ABDOMEN: Gallbladder stones.    BONES/SOFT TISSUES: Status post right mastectomy. Status post left   humerus ORIF. Marked kyphosis. Multiple lytic bone lesions predominantly   in the posterior and lateral thoracic vertebral bodies. It is unclear if   there is any spinal canal extension. Lytic lesions are alsoseen at the   right posterior seventh rib and right humeral head    OTHER: Right maxillary sinus is opacified.      IMPRESSION:  Bilateral dependent atelectasis. Interval resolution of left lower lobe   complete collapse.    Multiple lytic bone lesions, predominantly in the thoracic vertebral   bodies which is concerning for metastatic disease. Unclear if there is   any spinal canal extension. Recommend MRI of the thoracic spine for   further evaluation.    BAILEY STARR MD; Resident Radiologist  This document has been electronically signed.  HAY IRVIN MD; Attending Radiologist  This document has been electronically signed. Aug 29 2022 12:01PM  ---------------------------------------------------------------------------------------------------------------  EXAM:  CT ABDOMEN AND PELVIS IC                          PROCEDURE DATE:  2022      IMPRESSION:    Multiple lytic lesions are seen throughout the axial and appendicular   skeleton, some of which appear increased in size from CT abdomen pelvis   3/16/2022 when evaluated in retrospect. A few lytic lesions involve the   left intertrochanteric region and right acetabulum.    Endometrial thickening. Recommend dedicated pelvic sonogram.    Cystic lesions are seen within the pancreas. Recommend a dedicated MRI on   a nonemergent basis.    SULY KENDRICK MD; Resident Radiology  This document has been electronically signed.  BRITTANI MALCOLM MD; Attending Radiologist  This document has been electronically signed. Sep  1 2022 11:01AM  ---------------------------------------------------------------------------------------------------------------  EXAM:  XR HUMERUS MIN 2 VIEWS RT                          PROCEDURE DATE:  2022      EXAM:  Internal/external rotation AP right humerus from 2022 at 0927.   Compared to appearance on previous day chest CT.    IMPRESSION:  Generalized osteopenia. Redemonstrated focal lytic lesion in proximal   medial humeral diaphysis with small area of permeative cortical   destruction. No additional radiographically appreciated suspicious lytic   or blastic lesions in remaining imaged regions.    No current fractures or dislocations.    Preserved shoulder and elbow joint spaces.    IV cannula with attached tubing overlies upper arm.    MINE NICE MD; Attending Radiologist  This document has been electronically signed. Aug 30 2022 10:39AM  ---------------------------------------------------------------------------------------------------------------  EXAM:  DUPLEX EXT VEINS UPPER LT                          PROCEDURE DATE:  2022      IMPRESSION:  No evidence of left upper extremity deep venous thrombosis.    FAWN FITZGERALD MD; Attending Radiologist  This document has been electronically signed. Sep  1 2022  1:10PM  ---------------------------------------------------------------------------------------------------------------  EXAM:  DUPLEX SCAN EXT VEINS LOWER BI                          PROCEDURE DATE:  2022      IMPRESSION:  No evidence of deep venous thrombosis in either lower extremity.    KEYLA ROMERO MD; Attending Radiologist  This document has been electronically signed. Aug 31 2022  7:59PM  -----------------------------------------------------------------------------------

## 2022-09-08 NOTE — H&P ADULT - PROBLEM SELECTOR PLAN 2
Pt presented with very mild leukocytosis of WBC 11k. No documented/reported fevers recently. Possibly 2/2 reactive in setting of metastatic cancer vs receiving steroid recently vs aspiration PNA/pneumonitis vs ?DVT/PE  - continue to trend WBC  - recently completed 5-day course of Zosyn (8/28-9/1)  - monitoring off abx for now given lower suspicion of PNA at this time  - low threshold to restart abx if clinically worsens  - recent MRSA swab neg on 8/28/22  - f/u BCx, UA/UCx, procal, b/l LE duplex

## 2022-09-08 NOTE — ED PROVIDER NOTE - ATTENDING CONTRIBUTION TO CARE
87 F w/ tracheomalacia,HTN, MI p/w SOB, increased WOB, w/ diffuse wheezing b/l lung fields missed BIPAP tonight, transferred from [facility] for further management of symtpoms. Pt w/ Const: awake and alert, in mod -severe distress Eyes: no conjunctival injection and no scleral icterus ENMT: dry mucus membranes, CVS: +S1/S2, DP pulse 2+ bilaterally RESP: b/l wheezing GI: Nontender/Nondistended soft abdomen, no CVA tenderness MSK: Extremities w/o deformity or ttp, no lower leg edema Psych: Awake, Alert, & Orientedx3;  Appropriate mood and affect, cooperative  findings c/f tracheomalacia, COPD, vs less likely pna, seen by ENT states no vocal cord edema, given steroids, nebs and tba .

## 2022-09-08 NOTE — PROGRESS NOTE ADULT - SUBJECTIVE AND OBJECTIVE BOX
afberile    REVIEW OF SYSTEMS:  GEN: no fever,    no chills  RESP: no SOB,   no cough  CVS: no chest pain,   no palpitations  GI: no abdominal pain,   no nausea,   no vomiting,   no constipation,   no diarrhea  : no dysuria,   no frequency  NEURO: no headache,   no dizziness  PSYCH: no depression,   not anxious  Derm : no rash    MEDICATIONS  (STANDING):  albuterol/ipratropium for Nebulization 3 milliLiter(s) Nebulizer every 6 hours  allopurinol 100 milliGRAM(s) Oral daily  anastrozole 1 milliGRAM(s) Oral daily  atorvastatin 20 milliGRAM(s) Oral at bedtime  cyanocobalamin 1000 MICROGram(s) Oral daily  enoxaparin Injectable 40 milliGRAM(s) SubCutaneous every 24 hours  escitalopram 10 milliGRAM(s) Oral daily  guaiFENesin Oral Liquid (Sugar-Free) 300 milliGRAM(s) Oral every 6 hours  levETIRAcetam  Solution 750 milliGRAM(s) Oral two times a day  levothyroxine 75 MICROGram(s) Oral daily  melatonin 3 milliGRAM(s) Oral at bedtime  psyllium Powder 1 Packet(s) Oral daily  sodium chloride 3%  Inhalation 4 milliLiter(s) Inhalation every 12 hours    MEDICATIONS  (PRN):  traMADol 50 milliGRAM(s) Oral two times a day PRN Severe Pain (7 - 10)      Vital Signs Last 24 Hrs  T(C): 37.1 (08 Sep 2022 05:00), Max: 37.1 (07 Sep 2022 20:43)  T(F): 98.7 (08 Sep 2022 05:00), Max: 98.8 (07 Sep 2022 20:43)  HR: 101 (08 Sep 2022 05:00) (100 - 103)  BP: 108/54 (08 Sep 2022 05:00) (92/61 - 135/72)  BP(mean): --  RR: 18 (08 Sep 2022 05:00) (18 - 18)  SpO2: 91% (08 Sep 2022 05:00) (91% - 99%)    Parameters below as of 08 Sep 2022 05:00  Patient On (Oxygen Delivery Method): room air      CAPILLARY BLOOD GLUCOSE        I&O's Summary    06 Sep 2022 07:01  -  07 Sep 2022 07:00  --------------------------------------------------------  IN: 450 mL / OUT: 1200 mL / NET: -750 mL    07 Sep 2022 07:01  -  08 Sep 2022 06:41  --------------------------------------------------------  IN: 1380 mL / OUT: 700 mL / NET: 680 mL        PHYSICAL EXAM:  HEAD:  Atraumatic, Normocephalic  NECK: Supple, No   JVD  CHEST/LUNG:   no     rales,     no,    rhonchi  HEART: Regular rate and rhythm;         murmur  ABDOMEN: Soft, Nontender, ;   EXTREMITIES:    no    edema  NEUROLOGY:  alert    LABS:                                  Consultant(s) Notes Reviewed:      Care Discussed with Consultants/Other Providers:

## 2022-09-08 NOTE — ED ADULT TRIAGE NOTE - NS ED NURSE DIRECT TO ROOM YN
Medical Necessity Clause: This procedure was medically necessary because the lesions that were treated were:
Post-Care Instructions: Instructed to keep the area clean with soap and water or Hibiclens. Avoid picking at any of the treated lesions. If desired, you may soak any with hydrogen peroxide to remove surface crusting and then cover with Vaseline ointment and a bandage to keep the area protected if you wish to do so.
Medical Necessity Information: It is in your best interest to select a reason for this procedure from the list below. All of these items fulfill various CMS LCD requirements except the new and changing color options.
Render Note In Bullet Format When Appropriate: No
Number Of Freeze-Thaw Cycles: 1 freeze-thaw cycle
Detail Level: Detailed
Consent: The patient's consent was obtained including but not limited to risks of redness, swelling, blistering, crusting, scabbing, scarring, darker or lighter pigment change, incomplete removal, recurrence, and infection.
Render Post-Care Instructions In Note?: yes
Yes

## 2022-09-08 NOTE — ED PROVIDER NOTE - OBJECTIVE STATEMENT
88YO F hx tracheomalacia, HTN, MI, p/w SOB. pt at [place], missed use of nightly bipap. pt arrives tachypneic with significant wheezing. pt endorses sob, denies fevers, cough, b/l LE edema.

## 2022-09-08 NOTE — PROGRESS NOTE ADULT - PROVIDER SPECIALTY LIST ADULT
Cardiology
Heme/Onc
Internal Medicine
Internal Medicine
Pulmonology
Cardiology
Internal Medicine
Internal Medicine
Pulmonology
Pulmonology
Cardiology
Cardiology
Heme/Onc
Internal Medicine
Pulmonology
Pulmonology
Internal Medicine
Internal Medicine
Intervent Radiology
Internal Medicine
Infectious Disease
Internal Medicine

## 2022-09-08 NOTE — H&P ADULT - NSHPLABSRESULTS_GEN_ALL_CORE
LABS:                        8.3    11.07 )-----------( 245      ( 08 Sep 2022 20:56 )             27.3     09-08    135  |  96  |  27<H>  ----------------------------<  142<H>  4.9   |  27  |  0.68    Ca    9.8      08 Sep 2022 20:56  Mg     2.1     09-08    TPro  7.3  /  Alb  3.8  /  TBili  0.4  /  DBili  x   /  AST  18  /  ALT  13  /  AlkPhos  176<H>  09-08    PT/INR - ( 08 Sep 2022 20:56 )   PT: 13.0 sec;   INR: 1.13 ratio         PTT - ( 08 Sep 2022 20:56 )  PTT:31.9 sec          SARS-CoV-2: NotDetec (08 Sep 2022 21:38)  COVID-19 PCR: NotDetec (07 Sep 2022 12:48)  COVID-19 PCR: NotDetec (03 Sep 2022 17:58)  SARS-CoV-2: NotDetec (02 Sep 2022 18:35)  SARS-CoV-2: NotDetec (27 Aug 2022 21:05)  COVID-19 PCR: NotDetec (20 Mar 2022 12:29)  COVID-19 PCR: NotDetec (16 Mar 2022 19:56)    EKG (9/8) personally reviewed: sinus tach w/ PACs and 1st degree AV block, , QTc 451, no significant ST elevations or depressions    CXR (9/8) personally reviewed: low lung volume, patchy atelectasis in LLL, no focal consolidations appreciated

## 2022-09-08 NOTE — H&P ADULT - NSHPSOCIALHISTORY_GEN_ALL_CORE
Per chart review, recently denied smoking cigarettes or using illicit/recreational drugs. Former heavy EtOH use. Completely dependent for ADLs/iADLs.

## 2022-09-08 NOTE — H&P ADULT - NSHPPHYSICALEXAM_GEN_ALL_CORE
Vital Signs Last 24 Hrs  T(C): 36.8 (08 Sep 2022 13:28), Max: 37.1 (08 Sep 2022 05:00)  T(F): 98.3 (08 Sep 2022 13:28), Max: 98.7 (08 Sep 2022 05:00)  HR: 110 (08 Sep 2022 23:31) (100 - 113)  BP: 171/152 (08 Sep 2022 23:31) (99/82 - 171/152)  BP(mean): 89 (08 Sep 2022 22:30) (79 - 89)  RR: 22 (08 Sep 2022 23:31) (18 - 28)  SpO2: 97% (08 Sep 2022 23:31) (91% - 110%)    Parameters below as of 08 Sep 2022 23:31  Patient On (Oxygen Delivery Method): BiPAP/CPAP      CONSTITUTIONAL: NAD, well-developed, well-groomed, slightly lethargic appearing  EYES: PERRL; sclera clear  ENMT: Moist oral mucosa, BIPAP mask in place  NECK: Supple, no palpable masses  RESPIRATORY: mildly dyspneic-appearing on BIPAP; lungs are clear to auscultation anteriorly; No wheezes or rales  CARDIOVASCULAR: Mildly tachycardic; S1/S2 heard; +systolic murmur; No rubs, or gallops; 2+ pitting extremity in b/l LEs; Peripheral pulses are 2+ bilaterally  ABDOMEN: Bowel sounds present; Soft, Nontender, Nondistended; PEG tube in place  MUSCULOSKELETAL: No clubbing or cyanosis of digits; L leg appears contracted  PSYCH: Limited due to difficulty understanding pt with dyspnea/BIPAP on; AAO at least 1 (oriented to person)  NEUROLOGY: Limited exam due to only intermittently following commands; sensation grossly intact to light touch in b/l LEs  SKIN: seborrheic dermatitis-like findings on head and extremities; No palpable lesions

## 2022-09-08 NOTE — CONSULT NOTE ADULT - ASSESSMENT
86 y/o female w/ multiple medical problems including CVA, left sided plegia and dysphagia requiring placement of a PEG, HTN, HLD, CAD s/p MI with preserved LVEF and moderate aortic stenosis, h/o perforated appendicitis with abscess formation. h/o respiratory failure due to mucous plugging with complete collapse of the left lung where bronchoscopy removal of copious amounts of purulent secretions from throughout the bronchial tree - there was severe tracheobronchomalacia. Appears patient was discharged earlier in the day back to her rehab center, she returns complaining of SOB and difficulty breathing. Unable to provide history.  Laryngoscopy was difficult as patient unable to tolerate scope. At one point, patient attempted to bite me. Was able to visualize airway, which was patent. No edema noted on larynx including the vocal cords.

## 2022-09-08 NOTE — PROGRESS NOTE ADULT - ASSESSMENT
89    year old female    h/o hemorrhagic CVA, has  PEG,  HTN, MI,   HLD, gout, right ca  breast   right Ca breast. s/p mastectomy in 2019,  s/p  sentinel node  localization.  4/4,  were  negative  peg dislodged.  IR   replacements  on 1/3/22  s/p rrt  . in past,  for hypoxia. cxr with complete  collapsed  of  left lung, needing broch,   s/ p  bronchoscopy , pt  with  tracheomalacia  and  collapsed  airways,  makes  this a  difficult  situation, as there is no good rx for this     h/o bacteremia. on   pna, perforated  appendicitis,  ?  mets  to  acetabulum, was  seen by dr pacheco   surg/ IR  and  oncology eval dr pacheco   sa w pt.  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 this will not alter rx. pt  will need   extended  course  of ab   on iv  vanco and ertapenem.  till  2/9/.22  ID dr reagan, and per  surg  and  IR  eval,  no intervention   CT  1/20/22, no PE. collection in right side   s/p  rectal bleed.        *  admitted with sob    ENT eval w/ laryngoscopy notable for vocal cords mobile and intact, no edema, upper airway patent   Duonebs and hypertonic saline for airway clearance    Zosyn empiric for pna    *   s/p cva, has  PEG,  npo  ,   on  synthroid, Keppra  ,  *  HTN, on meds  per  card  *  h/o  ca breast. /, s/p  R  mastectomy  *   obesity, bmi  was   41, now  is  30       c/c left  leg contracture ,  remains  bed  bound. functional  paraplegias  needs  assistance  for  all her  adl's   *  pt  with  h/o  tracheomalacia  and  collapsed  airways,      given pt;s  mlple co morbidities,  pt is  at risk for  re admissions     on nocturnal  bipap    difficult  situation, a s there  is no  good  rx  for  pt's  recurrent lung collapse hypoxia    h/o  of  chronic    mild  tachycardia   re  admission  likely,, given her  airways, and  propensity  for  lung collapse   pt  is  oxygen dependent /  now  on N/ C     G  tube  replacement   by IR/    Feest  by  swallow team    *   CT chest. lytic  lesions, T  spine/  ribs/ humerus/  oncoloigy  magnus pacheco.  suspect mets  from ?    ca breast/  h/o  mastectomy       called son. Sy, updated/ oncologist  has  also  discussed  with  son,  futility of  pursuing  bx. a s pt  is  not an ideal  candidate  for  chemo    CT  A.p ,/ prelim ,  pelvic  mets  per  oncology,   suspect  metastatic ca  breast    was  awaiting   mri  spine/  pt unable  to tolerate  mri   per oncology, no  further  intervention/  and on  Arimidex, now,   per d r ohri   seen by  PT.  await  d.c  to  n home.  d/c  order   written      per  son, pt is  full code

## 2022-09-08 NOTE — H&P ADULT - PROBLEM SELECTOR PLAN 6
Hx of mod AS with possible vegetation on aortic valve on prior TTE in 12/2021 (MIKALA not pursued given high risk, s/p extended course of abx). TTE reattempted in Jan 2022, but not fully completed as pt refused to proceed with exam.  - Per Cardiology last admission, no need to repeat echo  - monitor clinically for now  - f/u Cardiology consult in AM

## 2022-09-08 NOTE — H&P ADULT - NSHPREVIEWOFSYSTEMS_GEN_ALL_CORE
REVIEW OF SYSTEMS (limited due to mental status, dyspnea, and use of BIPAP):    CONSTITUTIONAL: No fever  RESPIRATORY: +SOB, no cough  CARDIOVASCULAR: No chest pain  GASTROINTESTINAL: No abdominal pain

## 2022-09-09 NOTE — PROGRESS NOTE ADULT - ASSESSMENT
89    year old female    h/o hemorrhagic CVA, has  PEG,  HTN, MI,   HLD, gout, right ca  breast   right Ca breast. s/p mastectomy in 2019,  s/p  sentinel node  localization.  4/4,  were  negative  peg dislodged.  IR   replacements  on 1/3/22  s/p rrt  . in past,  for hypoxia. cxr with complete  collapsed  of  left lung, needing broch,   s/ p  bronchoscopy , pt  with  tracheomalacia  and  collapsed  airways,  makes  this a  difficult  situation, as there is no good rx for this     h/o bacteremia. on   pna, perforated  appendicitis,  ?  mets  to  acetabulum, was  seen by dr pacheco   surg/ IR  and  oncology eval dr pacheco   sa w pt.  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 this will not alter rx. pt  will need   extended  course  of ab   on iv  vanco and ertapenem.  till  2/9/.22  ID dr reagan, and per  surg  and  IR  eval,  no intervention   CT  1/20/22, no PE. collection in right side   s/p  rectal bleed.        *  admitted with   presumed  resp  failure/ pt was  sent back from [facility], the same day   pt seen in er.  appears   at her [facility]    ENT eval w/ laryngoscopy notable for vocal cords mobile and intact, no edema, upper airway patent     hypertonic saline for airway clearance   cxr, no pna     *   s/p cva, has  PEG,  npo  ,   on  synthroid, Keppra  ,  *   HTN, on  cardizem,  per  card dr jamison  *  h/o  Ca breast. /, s/p  R  mastectomy  *   obesity, bmi  was   41, now  is  30   *     c/c left  leg contracture ,  remains  bed  bound. functional  paraplegia,  needs  assistance  for  all her  adl's   *  pt  with  h/o  tracheomalacia  and  collapsed  airways,      given pt;s  mlple co morbidities,  pt is  at risk for  re admissions     on nocturnal  bipap    difficult  situation, a s there  is no  good  rx  for  pt's  recurrent lung collapse hypoxia    h/o  of  chronic    mild  tachycardia    re  admission  likely,, given her  airways, and  propensity  for  lung collapse      *   CT chest. lytic  lesions, T  spine/  ribs/ humerus/  oncoloigy  magnus pacheco.  suspect  metastatic ca breast        son. Sy, updated/ oncologist  has  also  discussed  with  son,  futility of  pursuing  bx. a s pt  is  not an ideal  candidate  for  chemo    CT  A.p ,/ prelim ,  pelvic  mets  per  oncology,   suspect  metastatic ca  breast    was  awaiting   mri  spine/  pt unable  to tolerate  mri   per oncology, no  further  intervention/  and on  Arimidex, now,   per d r ohri      titrate  off bipap as  tolerated/  no good  solution . given her mlple co morbidities      per  son, pt is  full code

## 2022-09-09 NOTE — PROGRESS NOTE ADULT - SUBJECTIVE AND OBJECTIVE BOX
afberile/  on bipsp/  comfortbale  REVIEW OF SYSTEMS:  GEN: no fever,    no chills  RESP: no SOB,   no cough  CVS: no chest pain,   no palpitations  GI: no abdominal pain,   no nausea,   no vomiting,   no constipation,   no diarrhea  : no dysuria,   no frequency  NEURO: no headache,   no dizziness  PSYCH: no depression,   not anxious  Derm : no rash    MEDICATIONS  (STANDING):  albuterol/ipratropium for Nebulization 3 milliLiter(s) Nebulizer every 6 hours  allopurinol 100 milliGRAM(s) Oral daily  anastrozole 1 milliGRAM(s) Oral daily  atorvastatin 20 milliGRAM(s) Oral at bedtime  baclofen 10 milliGRAM(s) Oral every 12 hours  cyanocobalamin 1000 MICROGram(s) Oral daily  diltiazem    Tablet 30 milliGRAM(s) Oral three times a day  enoxaparin Injectable 40 milliGRAM(s) SubCutaneous every 24 hours  escitalopram 10 milliGRAM(s) Oral daily  levETIRAcetam  Solution 750 milliGRAM(s) Oral two times a day  levothyroxine 75 MICROGram(s) Oral daily  melatonin 3 milliGRAM(s) Oral at bedtime  psyllium Powder 1 Packet(s) Oral daily  sodium chloride 3%  Inhalation 4 milliLiter(s) Inhalation every 12 hours    MEDICATIONS  (PRN):  acetaminophen     Tablet .. 650 milliGRAM(s) Oral every 6 hours PRN Temp greater or equal to 38C (100.4F), Mild Pain (1 - 3)  traMADol 50 milliGRAM(s) Oral two times a day PRN Severe Pain (7 - 10)      Vital Signs Last 24 Hrs  T(C): 36.9 (09 Sep 2022 06:15), Max: 37.2 (09 Sep 2022 05:30)  T(F): 98.4 (09 Sep 2022 06:15), Max: 99 (09 Sep 2022 05:30)  HR: 77 (09 Sep 2022 09:27) (77 - 113)  BP: 142/96 (09 Sep 2022 06:15) (99/71 - 171/152)  BP(mean): 80 (09 Sep 2022 05:30) (79 - 89)  RR: 23 (09 Sep 2022 06:20) (18 - 28)  SpO2: 100% (09 Sep 2022 09:27) (93% - 110%)    Parameters below as of 09 Sep 2022 06:20  Patient On (Oxygen Delivery Method): BiPAP/CPAP    O2 Concentration (%): 40  CAPILLARY BLOOD GLUCOSE      POCT Blood Glucose.: 159 mg/dL (08 Sep 2022 20:38)    I&O's Summary      PHYSICAL EXAM:  HEAD:  Atraumatic, Normocephalic  NECK: Supple, No   JVD  CHEST/LUNG:   no     rales,     no,    rhonchi  HEART: Regular rate and rhythm;         murmur  ABDOMEN: Soft, Nontender, ;   EXTREMITIES:        edema  NEUROLOGY:  alert    LABS:                        8.2    7.60  )-----------( 223      ( 09 Sep 2022 05:58 )             26.7     09-09    136  |  99  |  25<H>  ----------------------------<  203<H>  5.0   |  22  |  0.63    Ca    9.3      09 Sep 2022 05:58  Phos  3.7     09-09  Mg     2.2     09-09    TPro  6.7  /  Alb  3.2<L>  /  TBili  0.4  /  DBili  x   /  AST  14  /  ALT  13  /  AlkPhos  156<H>  09-09    PT/INR - ( 08 Sep 2022 20:56 )   PT: 13.0 sec;   INR: 1.13 ratio         PTT - ( 08 Sep 2022 20:56 )  PTT:31.9 sec            09-08 @ 20:31  4.7  34              Consultant(s) Notes Reviewed:      Care Discussed with Consultants/Other Providers:

## 2022-09-09 NOTE — CHART NOTE - NSCHARTNOTEFT_GEN_A_CORE
informed by the patient's son that Michaelle was returned to the hospital [facility] with breathlessness, anxiety and wheezing - please see my note from yesterday - will have by partner see the patient tomorrow to manage her pulmonary care    Will follow with you. Plan of care discussed with the patient at bedside    Kennedy Marcano MD, [facility]  [phone]  Pulmonary Medicine [facility]     Informed by the patient's son that Michaelle was returned to the hospital after arrival at [facility] with breathlessness, anxiety and wheezing - will have by partner see the patient tomorrow to manage her pulmonary care    Will follow with you. Plan of care discussed with the patient at bedside    Kennedy Marcano MD, [facility]  [phone]  Pulmonary Medicine

## 2022-09-09 NOTE — CONSULT NOTE ADULT - ASSESSMENT
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 Very limited hx obtained from pt given mental status, dyspnea, and use of BIPAP. Unable to reach sonSy. History obtained from staff/chart review.    Per ED staff who saw pt when she first arrived, pt was notably dyspneic with increased WOB and wheezing. Supposedly missed use of nightly BIPAP at rehab. Reportedly denied fever and cough. On encounter for admission, pt with BIPAP mask on, appearing slightly lethargic and mildly dyspneic, but was able to answer some questions. Pt endorsed having SOB. Denied pain. Seemed to believe initially that she was at [facility]? but was difficult to understand her responses.    Of note, pt has multiple recent admissions with similar presentation. Most recently was hospitalized from 8/28/22-9/8/22 for SOB, treated with airway clearance and completed 5-day course of Zosyn for empiric coverage of PNA (though per Pulm, unlikely to have PNA). Course c/b PEG displacement and subsequent reinsertion by IR on 8/30. In addition to her usual PEG feeds, pt was also started on puree diet with moderately thickened fluids for pleasure feeds. Also was started on anastrozole for most likely dx of metastatic breast cancer to bones (pt was unable to tolerate further cancer workup of spine lesions). Pt remained full code during recent admissions.  pt with metastatic ca to the bone with sob sec to obesity and underlying lung and cardiac disease  increase sob sec to missing BiPAP at night  may consider pulmonary eval  may consider hospice care/ palliative care  dvt prophylaxis    In ED: -110s, RR 20s, sating 99% on BIPAP FiO2 40%. WBC 11k. CXR with patchy atelectasis in LLL and low lung volume, otherwise no focal consolidations. Received solumedrol 125mg IVP x1, 2.25% racepinephrine 0.5mL x1, duoneb x2, vanc/cefepime x1 dose. Admitted to Medicine for further management. 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 Very limited hx obtained from pt given mental status, dyspnea, and use of BIPAP. Unable to reach sonSy. History obtained from staff/chart review.    Per ED staff who saw pt when she first arrived, pt was notably dyspneic with increased WOB and wheezing. Supposedly missed use of nightly BIPAP at rehab. Reportedly denied fever and cough. On encounter for admission, pt with BIPAP mask on, appearing slightly lethargic and mildly dyspneic, but was able to answer some questions. Pt endorsed having SOB. Denied pain. Seemed to believe initially that she was at [facility]? but was difficult to understand her responses.    Of note, pt has multiple recent admissions with similar presentation. Most recently was hospitalized from 8/28/22-9/8/22 for SOB, treated with airway clearance and completed 5-day course of Zosyn for empiric coverage of PNA (though per Pulm, unlikely to have PNA). Course c/b PEG displacement and subsequent reinsertion by IR on 8/30. In addition to her usual PEG feeds, pt was also started on puree diet with moderately thickened fluids for pleasure feeds. Also was started on anastrozole for most likely dx of metastatic breast cancer to bones (pt was unable to tolerate further cancer workup of spine lesions). Pt remained full code during recent admissions.  pt with metastatic ca to the bone with sob sec to obesity and underlying lung and cardiac disease  increase sob sec to missing BiPAP at night  may consider pulmonary eval  may consider hospice care/ palliative care  dvt prophylaxis

## 2022-09-09 NOTE — CONSULT NOTE ADULT - SUBJECTIVE AND OBJECTIVE BOX
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 Very limited hx obtained from pt given mental status, dyspnea, and use of BIPAP. Unable to reach sonSy. History obtained from staff/chart review.    Per ED staff who saw pt when she first arrived, pt was notably dyspneic with increased WOB and wheezing. Supposedly missed use of nightly BIPAP at rehab. Reportedly denied fever and cough. On encounter for admission, pt with BIPAP mask on, appearing slightly lethargic and mildly dyspneic, but was able to answer some questions. Pt endorsed having SOB. Denied pain. Seemed to believe initially that she was at [facility] rehab? but was difficult to understand her responses.    Of note, pt has multiple recent admissions with similar presentation. Most recently was hospitalized from 8/28/22-9/8/22 for SOB, treated with airway clearance and completed 5-day course of Zosyn for empiric coverage of PNA (though per Pulm, unlikely to have PNA). Course c/b PEG displacement and subsequent reinsertion by IR on 8/30. In addition to her usual PEG feeds, pt was also started on puree diet with moderately thickened fluids for pleasure feeds. Also was started on anastrozole for most likely dx of metastatic breast cancer to bones (pt was unable to tolerate further cancer workup of spine lesions). Pt remained full code during recent admissions.    In ED: -110s, RR 20s, sating 99% on BIPAP FiO2 40%. WBC 11k. CXR with patchy atelectasis in LLL and low lung volume, otherwise no focal consolidations. Received solumedrol 125mg IVP x1, 2.25% racepinephrine 0.5mL x1, duoneb x2, vanc/cefepime x1 dose. Admitted to Medicine for further management.        PAST MEDICAL & SURGICAL HISTORY:  MI (myocardial infarction)      Personal history of asbestosis      Gout      UTI (lower urinary tract infection)      ETOH abuse      CVA (cerebral vascular accident)      Tracheobronchomalacia      HTN (hypertension)      HLD (hyperlipidemia)      History of left shoulder fracture      History of benign breast tumor          MEDICATIONS  (STANDING):  albuterol/ipratropium for Nebulization 3 milliLiter(s) Nebulizer every 6 hours  allopurinol 100 milliGRAM(s) Oral daily  anastrozole 1 milliGRAM(s) Oral daily  atorvastatin 20 milliGRAM(s) Oral at bedtime  baclofen 10 milliGRAM(s) Oral every 12 hours  cyanocobalamin 1000 MICROGram(s) Oral daily  diltiazem    Tablet 30 milliGRAM(s) Oral three times a day  enoxaparin Injectable 40 milliGRAM(s) SubCutaneous every 24 hours  escitalopram 10 milliGRAM(s) Oral daily  levETIRAcetam  Solution 750 milliGRAM(s) Oral two times a day  levothyroxine 75 MICROGram(s) Oral daily  melatonin 3 milliGRAM(s) Oral at bedtime  psyllium Powder 1 Packet(s) Oral daily  sodium chloride 3%  Inhalation 4 milliLiter(s) Inhalation every 12 hours    MEDICATIONS  (PRN):  acetaminophen     Tablet .. 650 milliGRAM(s) Oral every 6 hours PRN Temp greater or equal to 38C (100.4F), Mild Pain (1 - 3)  traMADol 50 milliGRAM(s) Oral two times a day PRN Severe Pain (7 - 10)      FAMILY HISTORY:  No pertinent family history in first degree relatives        SOCIAL HISTORY:    [x ] Non-smoker  [ ] Smoker  [x ] Ex Alcohol    Allergies    Toradol (Urticaria (Mild to Mod); Rash (Mild to Mod))    Intolerances    	    REVIEW OF SYSTEMS:  CONSTITUTIONAL: No fever, weight loss, or fatigue  EYES: No eye pain, visual disturbances, or discharge  ENT:  No difficulty hearing, tinnitus, vertigo; No sinus or throat pain  NECK: No pain or stiffness  RESPIRATORY: No cough, wheezing, chills or hemoptysis; + Shortness of Breath  CARDIOVASCULAR: No chest pain, palpitations, passing out, dizziness, or leg swelling  GASTROINTESTINAL: No abdominal or epigastric pain. No nausea, vomiting, or hematemesis; No diarrhea or constipation. No melena or hematochezia.  GENITOURINARY: No dysuria, frequency, hematuria, or incontinence  NEUROLOGICAL: No headaches, memory loss, loss of strength, numbness, or tremors  SKIN: No itching, burning, rashes, or lesions   LYMPH Nodes: No enlarged glands  ENDOCRINE: No heat or cold intolerance; No hair loss  MUSCULOSKELETAL: No joint pain or swelling; No muscle, back, or extremity pain  PSYCHIATRIC: No depression, anxiety, mood swings, or difficulty sleeping  HEME/LYMPH: No easy bruising, or bleeding gums  ALLERGY AND IMMUNOLOGIC: No hives or eczema	    [ ] All others negative	  [x ] Unable to obtain    PHYSICAL EXAM:  T(C): 36.9 (09-09-22 @ 06:15), Max: 37.2 (09-09-22 @ 05:30)  HR: 90 (09-09-22 @ 06:20) (90 - 113)  BP: 142/96 (09-09-22 @ 06:15) (99/71 - 171/152)  RR: 23 (09-09-22 @ 06:20) (18 - 28)  SpO2: 100% (09-09-22 @ 06:20) (93% - 110%)  Wt(kg): --  I&O's Summary      Appearance: Normal	  HEENT:   Normal oral mucosa, PERRL, EOMI	  Lymphatic: No lymphadenopathy  Cardiovascular: Normal S1 S2, No JVD, + murmurs, No edema  Respiratory: rhonchi  Psychiatry: A & O x 3, Mood & affect appropriate  Gastrointestinal:  Soft, Non-tender, + BS	  Skin: No rashes, No ecchymoses, No cyanosis	  Neurologic: Non-focal  Extremities: Normal range of motion, No clubbing, cyanosis or edema  Vascular: Peripheral pulses palpable 2+ bilaterally    TELEMETRY: 	    ECG:  	  RADIOLOGY:  OTHER: 	  	  LABS:	 	    CARDIAC MARKERS:                              8.2    7.60  )-----------( 223      ( 09 Sep 2022 05:58 )             26.7     09-09    136  |  99  |  25<H>  ----------------------------<  203<H>  5.0   |  22  |  0.63    Ca    9.3      09 Sep 2022 05:58  Phos  3.7     09-09  Mg     2.2     09-09    TPro  6.7  /  Alb  3.2<L>  /  TBili  0.4  /  DBili  x   /  AST  14  /  ALT  13  /  AlkPhos  156<H>  09-09    proBNP: Serum Pro-Brain Natriuretic Peptide: 461 pg/mL (09-08 @ 20:56)    Lipid Profile:   HgA1c:   TSH:   PT/INR - ( 08 Sep 2022 20:56 )   PT: 13.0 sec;   INR: 1.13 ratio         PTT - ( 08 Sep 2022 20:56 )  PTT:31.9 sec    PREVIOUS DIAGNOSTIC TESTING:      < from: TTE with Doppler (w/Cont) (01.21.22 @ 14:08) >  Mitral Valve: Normal mitral valve.  Aortic Valve/Aorta: Calcified trileaflet aortic valve with  decreased opening. Mild aortic regurgitation.  Aortic Root: 3.3 cm.  LVOT diameter: 2.2 cm.  Left Atrium: Normal left atrium.  Left Ventricle: Hyperdynamic left ventricular systolic  function.   Endocardial visualization enhanced with  intravenous injection of Ultrasonic Enhancing Agent  (Definity). Concentric left ventricular hypertrophy.  Right Heart: Normal right atrium. The right ventricle is  not well visualized; grossly normal right ventricular  systolic function. Normal tricuspid valve.  Pericardium/Pleura: Normal pericardium with no pericardial  effusion.  ------------------------------------------------------------------------  Conclusions:  1. Concentric left ventricular hypertrophy.  2. Hyperdynamic left ventricular systolic function.  Endocardial visualization enhanced with intravenous  injection of Ultrasonic Enhancing Agent (Definity).  3. The right ventricle is not well visualized; grossly  normal right ventricular systolic function.  Pt refused to proceed with exam.  Limited Doppler study.  No trans aortic gradients.  Unable to rule out endocarditis.    < from: Xray Chest 1 View AP/PA (09.08.22 @ 20:46) >  Positioning limits evaluation of the apices.  Gastrostomy tube.  The heart is poorly assessed on this projection..  Low lung volumes. Patchy atelectasis in the left lower lobe. No focal   opacities.  No pleural effusion.    IMPRESSION:  Low lung volumes and atelectasis.

## 2022-09-10 NOTE — PHYSICAL THERAPY INITIAL EVALUATION ADULT - SPECIFY REASON(S)
Pt is dependent at baseline, EMR/PT notes reviewed from most recent admit to admission 10/2018 & pt remains dependent at functional baseline.

## 2022-09-10 NOTE — PROGRESS NOTE ADULT - SUBJECTIVE AND OBJECTIVE BOX
afberile    REVIEW OF SYSTEMS:  GEN: no fever,    no chills  RESP: no SOB,   no cough  CVS: no chest pain,   no palpitations  GI: no abdominal pain,   no nausea,   no vomiting,   no constipation,   no diarrhea  : no dysuria,   no frequency  NEURO: no headache,   no dizziness  PSYCH: no depression,   not anxious  Derm : no rash    MEDICATIONS  (STANDING):  albuterol/ipratropium for Nebulization 3 milliLiter(s) Nebulizer every 6 hours  allopurinol 100 milliGRAM(s) Oral daily  anastrozole 1 milliGRAM(s) Oral daily  atorvastatin 20 milliGRAM(s) Oral at bedtime  diltiazem    Tablet 30 milliGRAM(s) Oral three times a day  enoxaparin Injectable 40 milliGRAM(s) SubCutaneous every 24 hours  levETIRAcetam  Solution 750 milliGRAM(s) Oral two times a day  levothyroxine 75 MICROGram(s) Oral daily  melatonin 3 milliGRAM(s) Oral at bedtime  senna 2 Tablet(s) Oral at bedtime  sodium chloride 3%  Inhalation 4 milliLiter(s) Inhalation every 12 hours    MEDICATIONS  (PRN):  acetaminophen     Tablet .. 650 milliGRAM(s) Oral every 6 hours PRN Temp greater or equal to 38C (100.4F), Mild Pain (1 - 3)  baclofen 5 milliGRAM(s) Oral every 12 hours PRN Muscle Spasm  bisacodyl Suppository 10 milliGRAM(s) Rectal daily PRN Constipation  traMADol 50 milliGRAM(s) Oral two times a day PRN Severe Pain (7 - 10)      Vital Signs Last 24 Hrs  T(C): 36.4 (10 Sep 2022 05:00), Max: 36.9 (09 Sep 2022 15:52)  T(F): 97.6 (10 Sep 2022 05:00), Max: 98.4 (09 Sep 2022 15:52)  HR: 80 (10 Sep 2022 05:00) (77 - 91)  BP: 147/77 (10 Sep 2022 05:00) (118/75 - 147/77)  BP(mean): 97 (09 Sep 2022 15:20) (97 - 97)  RR: 18 (10 Sep 2022 05:00) (18 - 20)  SpO2: 92% (10 Sep 2022 05:00) (92% - 100%)    Parameters below as of 10 Sep 2022 05:00  Patient On (Oxygen Delivery Method): nasal cannula      CAPILLARY BLOOD GLUCOSE        I&O's Summary      PHYSICAL EXAM:  HEAD:  Atraumatic, Normocephalic  NECK: Supple, No   JVD  CHEST/LUNG:   no     rales,     no,    rhonchi  HEART: Regular rate and rhythm;         murmur  ABDOMEN: Soft, Nontender, ;   EXTREMITIES:        edema  NEUROLOGY:  alert    LABS:                        8.2    7.60  )-----------( 223      ( 09 Sep 2022 05:58 )             26.7     09-09    136  |  99  |  25<H>  ----------------------------<  203<H>  5.0   |  22  |  0.63    Ca    9.3      09 Sep 2022 05:58  Phos  3.7     09-09  Mg     2.2     09-09    TPro  6.7  /  Alb  3.2<L>  /  TBili  0.4  /  DBili  x   /  AST  14  /  ALT  13  /  AlkPhos  156<H>  09-09    PT/INR - ( 08 Sep 2022 20:56 )   PT: 13.0 sec;   INR: 1.13 ratio         PTT - ( 08 Sep 2022 20:56 )  PTT:31.9 sec            09-08 @ 20:31  4.7  34              Consultant(s) Notes Reviewed:      Care Discussed with Consultants/Other Providers:

## 2022-09-10 NOTE — PROGRESS NOTE ADULT - SUBJECTIVE AND OBJECTIVE BOX
CARDIOLOGY     PROGRESS  NOTE   ________________________________________________    CHIEF COMPLAINT:Patient is a 87y old  Female who presents with a chief complaint of SOB (10 Sep 2022 08:57)  sleeping.  	  REVIEW OF SYSTEMS:  CONSTITUTIONAL: No fever, weight loss, or fatigue  EYES: No eye pain, visual disturbances, or discharge  ENT:  No difficulty hearing, tinnitus, vertigo; No sinus or throat pain  NECK: No pain or stiffness  RESPIRATORY: No cough, wheezing, chills or hemoptysis; + Shortness of Breath  CARDIOVASCULAR: No chest pain, palpitations, passing out, dizziness, or leg swelling  GASTROINTESTINAL: No abdominal or epigastric pain. No nausea, vomiting, or hematemesis; No diarrhea or constipation. No melena or hematochezia.  GENITOURINARY: No dysuria, frequency, hematuria, or incontinence  NEUROLOGICAL: No headaches, memory loss, loss of strength, numbness, or tremors  SKIN: No itching, burning, rashes, or lesions   LYMPH Nodes: No enlarged glands  ENDOCRINE: No heat or cold intolerance; No hair loss  MUSCULOSKELETAL: No joint pain or swelling; No muscle, back, or extremity pain  PSYCHIATRIC: No depression, anxiety, mood swings, or difficulty sleeping  HEME/LYMPH: No easy bruising, or bleeding gums  ALLERGY AND IMMUNOLOGIC: No hives or eczema	    [x ] All others negative	  [ ] Unable to obtain    PHYSICAL EXAM:  T(C): 36.7 (09-10-22 @ 11:39), Max: 36.9 (09-09-22 @ 15:52)  HR: 91 (09-10-22 @ 11:39) (79 - 91)  BP: 108/64 (09-10-22 @ 11:39) (108/64 - 147/77)  RR: 18 (09-10-22 @ 11:39) (18 - 20)  SpO2: 98% (09-10-22 @ 11:39) (92% - 99%)  Wt(kg): --  I&O's Summary      Appearance: Normal	  HEENT:   Normal oral mucosa, PERRL, EOMI	  Lymphatic: No lymphadenopathy  Cardiovascular: Normal S1 S2, No JVD, + murmurs, No edema  Respiratory:rhonchi  Psychiatry: A & O x 3, Mood & affect appropriate  Gastrointestinal:  Soft, Non-tender, + BS	  Skin: No rashes, No ecchymoses, No cyanosis	  Neurologic: Non-focal  Extremities: Normal range of motion, No clubbing, cyanosis or edema  Vascular: Peripheral pulses palpable 2+ bilaterally    MEDICATIONS  (STANDING):  albuterol/ipratropium for Nebulization 3 milliLiter(s) Nebulizer every 6 hours  allopurinol 100 milliGRAM(s) Oral daily  anastrozole 1 milliGRAM(s) Oral daily  atorvastatin 20 milliGRAM(s) Oral at bedtime  diltiazem    Tablet 30 milliGRAM(s) Oral three times a day  enoxaparin Injectable 40 milliGRAM(s) SubCutaneous every 24 hours  levETIRAcetam  Solution 750 milliGRAM(s) Oral two times a day  levothyroxine 75 MICROGram(s) Oral daily  melatonin 3 milliGRAM(s) Oral at bedtime  senna 2 Tablet(s) Oral at bedtime  sodium chloride 3%  Inhalation 4 milliLiter(s) Inhalation every 12 hours      TELEMETRY: 	    ECG:  	  RADIOLOGY:  OTHER: 	  	  LABS:	 	    CARDIAC MARKERS:                                8.2    7.60  )-----------( 223      ( 09 Sep 2022 05:58 )             26.7     09-09    136  |  99  |  25<H>  ----------------------------<  203<H>  5.0   |  22  |  0.63    Ca    9.3      09 Sep 2022 05:58  Phos  3.7     09-09  Mg     2.2     09-09    TPro  6.7  /  Alb  3.2<L>  /  TBili  0.4  /  DBili  x   /  AST  14  /  ALT  13  /  AlkPhos  156<H>  09-09    proBNP: Serum Pro-Brain Natriuretic Peptide: 461 pg/mL (09-08 @ 20:56)  Serum Pro-Brain Natriuretic Peptide: 155 pg/mL (08-27 @ 21:06)    Lipid Profile:   HgA1c:   TSH:   PT/INR - ( 08 Sep 2022 20:56 )   PT: 13.0 sec;   INR: 1.13 ratio         PTT - ( 08 Sep 2022 20:56 )  PTT:31.9 sec      Assessment and plan  ---------------------------  86F w/ extensive hx including severe tracheobronchomalacia (c/b hypoxia 2/2 mucous plugging and recurrent L lung collapse), 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 Very limited hx obtained from pt given mental status, dyspnea, and use of BIPAP. Unable to reach sonSy. History obtained from staff/chart review.    Per ED staff who saw pt when she first arrived, pt was notably dyspneic with increased WOB and wheezing. Supposedly missed use of nightly BIPAP at rehab. Reportedly denied fever and cough. On encounter for admission, pt with BIPAP mask on, appearing slightly lethargic and mildly dyspneic, but was able to answer some questions. Pt endorsed having SOB. Denied pain. Seemed to believe initially that she was at [facility]? but was difficult to understand her responses.    Of note, pt has multiple recent admissions with similar presentation. Most recently was hospitalized from 8/28/22-9/8/22 for SOB, treated with airway clearance and completed 5-day course of Zosyn for empiric coverage of PNA (though per Pulm, unlikely to have PNA). Course c/b PEG displacement and subsequent reinsertion by IR on 8/30. In addition to her usual PEG feeds, pt was also started on puree diet with moderately thickened fluids for pleasure feeds. Also was started on anastrozole for most likely dx of metastatic breast cancer to bones (pt was unable to tolerate further cancer workup of spine lesions). Pt remained full code during recent admissions.  pt with metastatic ca to the bone with sob sec to obesity and underlying lung and cardiac disease  increase sob sec to missing BiPAP at night  may consider pulmonary eval  may consider hospice care/ palliative care  continue Diltiazem  dvt prophylaxis

## 2022-09-10 NOTE — PROGRESS NOTE ADULT - ASSESSMENT
89    year old female    h/o hemorrhagic CVA, has  PEG,  HTN, MI,   HLD, gout, right ca  breast   right Ca breast. s/p mastectomy in 2019,  s/p  sentinel node  localization.  4/4,  were  negative  peg dislodged.  IR   replacements  on 1/3/22  s/p rrt  . in past,  for hypoxia. cxr with complete  collapsed  of  left lung, needing broch,   s/ p  bronchoscopy , pt  with  tracheomalacia  and  collapsed  airways,  makes  this a  difficult  situation, as there is no good rx for this     h/o bacteremia. on   pna, perforated  appendicitis,  ?  mets  to  acetabulum, was  seen by dr pacheco   surg/ IR  and  oncology eval dr pacheco   sa w pt.  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 this will not alter rx. pt  will need   extended  course  of ab   on iv  vanco and ertapenem.  till  2/9/.22  ID dr reagan, and per  surg  and  IR  eval,  no intervention   CT  1/20/22, no PE. collection in right side   s/p  rectal bleed.        *  admitted with   presumed  resp  failure/ pt was  sent back from [facility], the same day   pt seen in er.  appears   at her naseline.  no  wheezing /  atousable    ENT eval w/ laryngoscopy notable for vocal cords mobile and intact, no edema, upper airway patent     hypertonic saline for airway clearance   cxr, no pna     *   s/p cva, has  PEG,  npo  ,   on  synthroid, Keppra  ,  *   HTN, on  cardizem,  per  card dr jamison  *  h/o  Ca breast. /, s/p  R  mastectomy  *   obesity, bmi  was   41, now  is  30   *     c/c left  leg contracture ,  remains  bed  bound. functional  paraplegia,  needs  assistance  for  all her  adl's   *  pt  with  h/o  tracheomalacia  and  collapsed  airways,      given pt;s  mlple co morbidities,  pt is  at risk for  re admissions     on nocturnal  bipap    difficult  situation, a s there  is no  good  rx  for  pt's  recurrent lung collapse hypoxia    h/o  of  chronic    mild  tachycardia    re  admission  likely,, given her  airways, and  propensity  for  lung collapse      *   CT chest. lytic  lesions, T  spine/  ribs/ humerus/  oncoloigy  d r ohri.  suspect  metastatic ca breast        son. Sy, updated/ oncologist  has  also  discussed  with  son,  futility of  pursuing  bx. a s pt  is  not an ideal  candidate  for  chemo    CT  A.p ,/ prelim ,  pelvic  mets  per  oncology,   suspect  metastatic ca  breast    was  awaiting   mri  spine/  pt unable  to tolerate  mri   per oncology, no  further  intervention/  and on  Arimidex, now,   per d r ohri  pulm  f/p,  difficult  situation, as  there  are no  good  options      titrate  off bipap as  tolerated/  no good  solution . given her mlple co morbidities      per  son, pt is  full code

## 2022-09-11 NOTE — PROGRESS NOTE ADULT - SUBJECTIVE AND OBJECTIVE BOX
afberile  REVIEW OF SYSTEMS:  GEN: no fever,    no chills  RESP: no SOB,   no cough  CVS: no chest pain,   no palpitations  GI: no abdominal pain,   no nausea,   no vomiting,   no constipation,   no diarrhea  : no dysuria,   no frequency  NEURO: no headache,   no dizziness  PSYCH: no depression,   not anxious  Derm : no rash    MEDICATIONS  (STANDING):  albuterol/ipratropium for Nebulization 3 milliLiter(s) Nebulizer every 6 hours  allopurinol 100 milliGRAM(s) Oral daily  anastrozole 1 milliGRAM(s) Oral daily  atorvastatin 20 milliGRAM(s) Oral at bedtime  diltiazem    Tablet 30 milliGRAM(s) Oral three times a day  enoxaparin Injectable 40 milliGRAM(s) SubCutaneous every 24 hours  levETIRAcetam  Solution 750 milliGRAM(s) Oral two times a day  levothyroxine 75 MICROGram(s) Oral daily  melatonin 3 milliGRAM(s) Oral at bedtime  senna 2 Tablet(s) Oral at bedtime  sodium chloride 3%  Inhalation 4 milliLiter(s) Inhalation every 12 hours    MEDICATIONS  (PRN):  acetaminophen     Tablet .. 650 milliGRAM(s) Oral every 6 hours PRN Temp greater or equal to 38C (100.4F), Mild Pain (1 - 3)  baclofen 5 milliGRAM(s) Oral every 12 hours PRN Muscle Spasm  bisacodyl Suppository 10 milliGRAM(s) Rectal daily PRN Constipation  polyethylene glycol 3350 17 Gram(s) Oral daily PRN Constipation  traMADol 50 milliGRAM(s) Oral two times a day PRN Severe Pain (7 - 10)      Vital Signs Last 24 Hrs  T(C): 36.9 (11 Sep 2022 05:52), Max: 36.9 (11 Sep 2022 05:52)  T(F): 98.4 (11 Sep 2022 05:52), Max: 98.4 (11 Sep 2022 05:52)  HR: 99 (11 Sep 2022 05:52) (91 - 99)  BP: 121/71 (11 Sep 2022 05:52) (108/64 - 128/73)  BP(mean): --  RR: 18 (11 Sep 2022 05:52) (18 - 18)  SpO2: 94% (11 Sep 2022 05:52) (94% - 98%)    Parameters below as of 11 Sep 2022 05:52  Patient On (Oxygen Delivery Method): nasal cannula  O2 Flow (L/min): 3    CAPILLARY BLOOD GLUCOSE        I&O's Summary    10 Sep 2022 07:01  -  11 Sep 2022 07:00  --------------------------------------------------------  IN: 0 mL / OUT: 1100 mL / NET: -1100 mL        PHYSICAL EXAM:  HEAD:  Atraumatic, Normocephalic  NECK: Supple, No   JVD  CHEST/LUNG:   no     rales,     no,    rhonchi  HEART: Regular rate and rhythm;         murmur  ABDOMEN: Soft, Nontender, ;   EXTREMITIES:     no   edema  NEUROLOGY:  alert    LABS:                                  Consultant(s) Notes Reviewed:      Care Discussed with Consultants/Other Providers:

## 2022-09-11 NOTE — CONSULT NOTE ADULT - SUBJECTIVE AND OBJECTIVE BOX
88 yo woman met breast cancer on anastrozole chronic tracheomalacia requiring bipap, bronchodilators was most recently at [facility] rehab readmitted with dyspnea, as was off bipap  recently started on anastrozole for met breast cancer to bone  pmh sh fh unchanged comp ros no bone pain dyspnea improved otherwise neg  physical  elderly  obese  vs  t98.4 99 121/71 18 94 sat  lungs occ wheeze  cor s1s2  abd soft nontender  ext no edema  skin warm dry    data  wbc 7600 hgb 8.2 plt [id number] inr 1.1 ptt 31.9 cr 0.6 alk phos 156  spep nl free k/l ratio nl

## 2022-09-11 NOTE — PROGRESS NOTE ADULT - SUBJECTIVE AND OBJECTIVE BOX
CARDIOLOGY     PROGRESS  NOTE   ________________________________________________    CHIEF COMPLAINT:Patient is a 87y old  Female who presents with a chief complaint of SOB (11 Sep 2022 10:04)  no complain.  	  REVIEW OF SYSTEMS:  CONSTITUTIONAL: No fever, weight loss, or fatigue  EYES: No eye pain, visual disturbances, or discharge  ENT:  No difficulty hearing, tinnitus, vertigo; No sinus or throat pain  NECK: No pain or stiffness  RESPIRATORY: No cough, wheezing, chills or hemoptysis; +decrease  Shortness of Breath  CARDIOVASCULAR: No chest pain, palpitations, passing out, dizziness, or leg swelling  GASTROINTESTINAL: No abdominal or epigastric pain. No nausea, vomiting, or hematemesis; No diarrhea or constipation. No melena or hematochezia.  GENITOURINARY: No dysuria, frequency, hematuria, or incontinence  NEUROLOGICAL: No headaches, memory loss, loss of strength, numbness, or tremors  SKIN: No itching, burning, rashes, or lesions   LYMPH Nodes: No enlarged glands  ENDOCRINE: No heat or cold intolerance; No hair loss  MUSCULOSKELETAL: No joint pain or swelling; No muscle, back, or extremity pain  PSYCHIATRIC: No depression, anxiety, mood swings, or difficulty sleeping  HEME/LYMPH: No easy bruising, or bleeding gums  ALLERGY AND IMMUNOLOGIC: No hives or eczema	    [ ] All others negative	  [ ] Unable to obtain    PHYSICAL EXAM:  T(C): 36.9 (09-11-22 @ 05:52), Max: 36.9 (09-11-22 @ 05:52)  HR: 99 (09-11-22 @ 05:52) (94 - 99)  BP: 121/71 (09-11-22 @ 05:52) (113/69 - 128/73)  RR: 18 (09-11-22 @ 05:52) (18 - 18)  SpO2: 94% (09-11-22 @ 05:52) (94% - 96%)  Wt(kg): --  I&O's Summary    10 Sep 2022 07:01  -  11 Sep 2022 07:00  --------------------------------------------------------  IN: 0 mL / OUT: 1100 mL / NET: -1100 mL        Appearance: Normal	  HEENT:   Normal oral mucosa, PERRL, EOMI	  Lymphatic: No lymphadenopathy  Cardiovascular: Normal S1 S2, No JVD, + murmurs, + edema  Respiratory: Lungs clear to auscultation	  Psychiatry: A & O x 3, Mood & affect appropriate  Gastrointestinal:  Soft, Non-tender, + BS	  Skin: No rashes, No ecchymoses, No cyanosis	  Neurologic: Non-focal  Extremities: Normal range of motion, No clubbing, cyanosis , + edema  Vascular: Peripheral pulses palpable 2+ bilaterally    MEDICATIONS  (STANDING):  albuterol/ipratropium for Nebulization 3 milliLiter(s) Nebulizer every 6 hours  allopurinol 100 milliGRAM(s) Oral daily  anastrozole 1 milliGRAM(s) Oral daily  atorvastatin 20 milliGRAM(s) Oral at bedtime  diltiazem    Tablet 30 milliGRAM(s) Oral three times a day  enoxaparin Injectable 40 milliGRAM(s) SubCutaneous every 24 hours  levETIRAcetam  Solution 750 milliGRAM(s) Oral two times a day  levothyroxine 75 MICROGram(s) Oral daily  melatonin 3 milliGRAM(s) Oral at bedtime  senna 2 Tablet(s) Oral at bedtime  sodium chloride 3%  Inhalation 4 milliLiter(s) Inhalation every 12 hours      TELEMETRY: 	    ECG:  	  RADIOLOGY:  OTHER: 	  	  LABS:	 	    CARDIAC MARKERS:                  proBNP: Serum Pro-Brain Natriuretic Peptide: 461 pg/mL (09-08 @ 20:56)  Serum Pro-Brain Natriuretic Peptide: 155 pg/mL (08-27 @ 21:06)    Lipid Profile:   HgA1c:   TSH:         Assessment and plan  ---------------------------  86F w/ extensive hx including severe tracheobronchomalacia (c/b hypoxia 2/2 mucous plugging and recurrent L lung collapse), 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 Very limited hx obtained from pt given mental status, dyspnea, and use of BIPAP. Unable to reach sonSy. History obtained from staff/chart review.    Per ED staff who saw pt when she first arrived, pt was notably dyspneic with increased WOB and wheezing. Supposedly missed use of nightly BIPAP at rehab. Reportedly denied fever and cough. On encounter for admission, pt with BIPAP mask on, appearing slightly lethargic and mildly dyspneic, but was able to answer some questions. Pt endorsed having SOB. Denied pain. Seemed to believe initially that she was at [facility]? but was difficult to understand her responses.    Of note, pt has multiple recent admissions with similar presentation. Most recently was hospitalized from 8/28/22-9/8/22 for SOB, treated with airway clearance and completed 5-day course of Zosyn for empiric coverage of PNA (though per Pulm, unlikely to have PNA). Course c/b PEG displacement and subsequent reinsertion by IR on 8/30. In addition to her usual PEG feeds, pt was also started on puree diet with moderately thickened fluids for pleasure feeds. Also was started on anastrozole for most likely dx of metastatic breast cancer to bones (pt was unable to tolerate further cancer workup of spine lesions). Pt remained full code during recent admissions.  pt with metastatic ca to the bone with sob sec to obesity and underlying lung and cardiac disease  increase sob sec to missing BiPAP at night  may consider pulmonary eval  may consider hospice care/ palliative care  continue Diltiazem  dvt prophylaxis  pt will need home o2 and Bipap at night  discussed with family at the bed side  yesterday

## 2022-09-11 NOTE — CONSULT NOTE ADULT - ASSESSMENT
met breast cancer on anastrozole tolerating well  acute on chronic dyspnea from tracheomalacia , rx per pulmonary  chronic anemia normocytic unclear etiology r/o deficiency state

## 2022-09-11 NOTE — PROGRESS NOTE ADULT - ASSESSMENT
89    year old female    h/o hemorrhagic CVA, has  PEG,  HTN, MI,   HLD, gout, right ca  breast   right Ca breast. s/p mastectomy in 2019,  s/p  sentinel node  localization.  4/4,  were  negative  peg dislodged.  IR   replacements  on 1/3/22  s/p rrt  . in past,  for hypoxia. cxr with complete  collapsed  of  left lung, needing broch,   s/ p  bronchoscopy , pt  with  tracheomalacia  and  collapsed  airways,  makes  this a  difficult  situation, as there is no good rx for this     h/o bacteremia. on   pna, perforated  appendicitis,  ?  mets  to  acetabulum, was  seen by dr pacheco   surg/ IR  and  oncology eval dr pacheco   sa w pt.  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 this will not alter rx. pt  will need   extended  course  of ab   on iv  vanco and ertapenem.  till  2/9/.22  ID dr reagan, and per  surg  and  IR  eval,  no intervention   CT  1/20/22, no PE. collection in right side   s/p  rectal bleed.        *  admitted with   presumed  resp  failure/ pt was  sent back from [facility], the same day   pt seen in er.  appears   at her naseline.  no  wheezing /  atousable    ENT eval w/ laryngoscopy notable for vocal cords mobile and intact, no edema, upper airway patent     hypertonic saline for airway clearance   cxr, no pna     *   s/p cva, has  PEG,  npo  ,   on  synthroid, Keppra  ,  *   HTN, on  cardizem,  per  card dr jamison  *  h/o  Ca breast. /, s/p  R  mastectomy  *   obesity, bmi  was   41, now  is  30   *     c/c left  leg contracture ,  remains  bed  bound. functional  paraplegia,  needs  assistance  for  all her  adl's   *  pt  with  h/o  tracheomalacia  and  collapsed  airways,      given pt;s  mlple co morbidities,  pt is  at risk for  re admissions     on nocturnal  bipap    difficult  situation, a s there  is no  good  rx  for  pt's  recurrent lung collapse hypoxia    h/o  of  chronic    mild  tachycardia    re  admission  likely,, given her  airways, and  propensity  for  lung collapse      *   CT chest. lytic  lesions, T  spine/  ribs/ humerus/  oncoloigy  d r ohri.  suspect  metastatic ca breast        son. Sy, updated/ oncologist  has  also  discussed  with  son,  futility of  pursuing  bx. a s pt  is  not an ideal  candidate  for  chemo    CT  A.p ,/ prelim ,  pelvic  mets  per  oncology,   suspect  metastatic ca  breast    was  awaiting   mri  spine/  pt unable  to tolerate  mri   per oncology, no  further  intervention/  and on  Arimidex, now,   per d r junior mendez  f/p, dr mir,   difficult  situation, as  there  are no  good  options    no good  solution . given her mlple co morbidities      per  son, pt is  full code

## 2022-09-12 NOTE — DIETITIAN INITIAL EVALUATION ADULT - PERTINENT LABORATORY DATA
09-11    136  |  100  |  24<H>  ----------------------------<  179<H>  4.1   |  25  |  0.60    Ca    9.0      11 Sep 2022 13:52    09-11 @ 13:52: Na 136, BUN 24<H>, Cr 0.60, <H>, K+ 4.1, Phos --, Mg --, Alk Phos --, ALT/SGPT --, AST/SGOT --, HbA1c --    POCT Blood Glucose.: 153 mg/dL (09-12-22 @ 06:45)  POCT Blood Glucose.: 167 mg/dL (09-12-22 @ 00:26)    A1C with Estimated Average Glucose Result: 6.3 % (12-21-21 @ 19:34)

## 2022-09-12 NOTE — DIETITIAN INITIAL EVALUATION ADULT - ENTERAL
continue Jevity1.5 at 70ml/hr x18 hr (ordered for Levothyroxine )to provide: 1260 ml, 1890 kcal (20 kcal/kg), 80 g/pro (0.9 g/pro/kg), 958 ml free water. defer water flushes to team.   Monitor GI tolerance. RD to remain available to adjust EN formulary, volume/rate PRN.

## 2022-09-12 NOTE — DIETITIAN INITIAL EVALUATION ADULT - NSFNSGIIOFT_GEN_A_CORE
09-11-22 @ 07:01  -  09-12-22 @ 07:00  --------------------------------------------------------  OUT:  Total OUT: 0 mL    Total NET: 840 mL

## 2022-09-12 NOTE — PROGRESS NOTE ADULT - SUBJECTIVE AND OBJECTIVE BOX
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 Very limited hx obtained from pt given mental status, dyspnea, and use of BIPAP. Unable to reach sonSy. History obtained from staff/chart review.    Per ED staff who saw pt when she first arrived, pt was notably dyspneic with increased WOB and wheezing. Supposedly missed use of nightly BIPAP at rehab. Reportedly denied fever and cough. On encounter for admission, pt with BIPAP mask on, appearing slightly lethargic and mildly dyspneic, but was able to answer some questions. Pt endorsed having SOB. Denied pain. Seemed to believe initially that she was at [facility]? but was difficult to understand her responses.    Of note, pt has multiple recent admissions with similar presentation. Most recently was hospitalized from 8/28/22-9/8/22 for SOB, treated with airway clearance and completed 5-day course of Zosyn for empiric coverage of PNA (though per Pulm, unlikely to have PNA). Course c/b PEG displacement and subsequent reinsertion by IR on 8/30. In addition to her usual PEG feeds, pt was also started on puree diet with moderately thickened fluids for pleasure feeds. Also was started on anastrozole for most likely dx of metastatic breast cancer to bones (pt was unable to tolerate further cancer workup of spine lesions). Pt remained full code during recent admissions.    In ED: -110s, RR 20s, sating 99% on BIPAP FiO2 40%. WBC 11k. CXR with patchy atelectasis in LLL and low lung volume, otherwise no focal consolidations. Received solumedrol 125mg IVP x1, 2.25% racepinephrine 0.5mL x1, duoneb x2, vanc/cefepime x1 dose. Admitted to Medicine for further management.   (08 Sep 2022 22:55)    PAST MEDICAL & SURGICAL HISTORY:  MI (myocardial infarction)      Personal history of asbestosis      Gout      UTI (lower urinary tract infection)      ETOH abuse      CVA (cerebral vascular accident)      Tracheobronchomalacia      HTN (hypertension)      HLD (hyperlipidemia)      History of left shoulder fracture      History of benign breast tumor        Allergies    Toradol (Urticaria (Mild to Mod); Rash (Mild to Mod))    Intolerances      Social History:  Per chart review, recently denied smoking cigarettes or using illicit/recreational drugs. Former heavy EtOH use. Completely dependent for ADLs/iADLs. (08 Sep 2022 22:55)    Medications:  acetaminophen     Tablet .. 650 milliGRAM(s) Oral every 6 hours PRN Temp greater or equal to 38C (100.4F), Mild Pain (1 - 3)  albuterol/ipratropium for Nebulization 3 milliLiter(s) Nebulizer every 6 hours  allopurinol 100 milliGRAM(s) Oral daily  anastrozole 1 milliGRAM(s) Oral daily  atorvastatin 20 milliGRAM(s) Oral at bedtime  baclofen 5 milliGRAM(s) Oral every 12 hours PRN Muscle Spasm  bisacodyl Suppository 10 milliGRAM(s) Rectal daily PRN Constipation  diltiazem    Tablet 30 milliGRAM(s) Oral three times a day  enoxaparin Injectable 40 milliGRAM(s) SubCutaneous every 24 hours  levETIRAcetam  Solution 750 milliGRAM(s) Oral two times a day  levothyroxine 75 MICROGram(s) Oral daily  melatonin 3 milliGRAM(s) Oral at bedtime  polyethylene glycol 3350 17 Gram(s) Oral daily PRN Constipation  senna 2 Tablet(s) Oral at bedtime  sodium chloride 3%  Inhalation 4 milliLiter(s) Inhalation every 12 hours  traMADol 50 milliGRAM(s) Oral two times a day PRN Severe Pain (7 - 10)    Labs:  CBC Full  -  ( 11 Sep 2022 18:09 )  WBC Count : x  RBC Count : x  Hemoglobin : 8.8 g/dL  Hematocrit : 29.2 %  Platelet Count - Automated : x  Mean Cell Volume : x  Mean Cell Hemoglobin : x  Mean Cell Hemoglobin Concentration : x  Auto Neutrophil # : x  Auto Lymphocyte # : x  Auto Monocyte # : x  Auto Eosinophil # : x  Auto Basophil # : x  Auto Neutrophil % : x  Auto Lymphocyte % : x  Auto Monocyte % : x  Auto Eosinophil % : x  Auto Basophil % : x    09-11    136  |  100  |  24<H>  ----------------------------<  179<H>  4.1   |  25  |  0.60    Ca    9.0      11 Sep 2022 13:52        Radiology:             ROS:  Patient comfortable without distress  No SOB or chest pain  No palpitation  No abdominal pain, diarrhaea or constipation  No weakness of extremities  No skin changes or swelling of legs  Rest of the comprehensive ROS was negative  Vital Signs Last 24 Hrs  T(C): 37.2 (12 Sep 2022 05:37), Max: 37.2 (11 Sep 2022 21:21)  T(F): 99 (12 Sep 2022 05:37), Max: 99 (11 Sep 2022 21:21)  HR: 80 (12 Sep 2022 05:37) (80 - 97)  BP: 113/- (12 Sep 2022 05:37) (113/- - 136/76)  BP(mean): --  RR: 18 (12 Sep 2022 05:37) (18 - 18)  SpO2: 98% (12 Sep 2022 05:37) (97% - 98%)    Parameters below as of 12 Sep 2022 05:37  Patient On (Oxygen Delivery Method): nasal cannula  O2 Flow (L/min): 3      Physical exam:  Patient alert and oriented  No distress  CVS: S1, S2 regular or murmur  Chest: bilateral breath sound without rales  Abdomen: soft, not tender, no organomegaly or masses  CNS: No focal neuro deficit  Musculoskeletal:  Normal range of motion  Skin: No rash    Assessment and Plan: Pt is an 85yo F w/ PMH of severe tracheobronchomalacia, HTN, HLD, CAD, CVA w/ residual L sided weakness, Intrinsic lung disease, Aortic stenosis, R breast CA s/p mastectomy, L lung collapse and MRSE p/w dyspnea from rehab.   She has a previous hx of resp failure d/t mucus plugging w/ complete L lung collapse and severe tracheobronchomalacia requiring hospitalization at [facility] in March 2022. After discharge to rehab pt was maintained on BiPAP for about one month's time during the evening hours but was subsequently taken off. She was reportedly doing well until until started complaining of an inability to breath and having increased WOB prompting her ED visit 8/28/22. She was discharged on 9/8/2022 and was readmitted with SOB on same day      PAST MEDICAL & SURGICAL HISTORY:  MI (myocardial infarction)      Personal history of asbestosis      Gout      UTI (lower urinary tract infection)      ETOH abuse      CVA (cerebral vascular accident)      Tracheobronchomalacia      HTN (hypertension)      HLD (hyperlipidemia)      History of left shoulder fracture      History of benign breast tumor        Allergies    Toradol (Urticaria (Mild to Mod); Rash (Mild to Mod))    Intolerances      Social History:  Per chart review, recently denied smoking cigarettes or using illicit/recreational drugs. Former heavy EtOH use. Completely dependent for ADLs/iADLs. (08 Sep 2022 22:55)    Medications:  acetaminophen     Tablet .. 650 milliGRAM(s) Oral every 6 hours PRN Temp greater or equal to 38C (100.4F), Mild Pain (1 - 3)  albuterol/ipratropium for Nebulization 3 milliLiter(s) Nebulizer every 6 hours  allopurinol 100 milliGRAM(s) Oral daily  anastrozole 1 milliGRAM(s) Oral daily  atorvastatin 20 milliGRAM(s) Oral at bedtime  baclofen 5 milliGRAM(s) Oral every 12 hours PRN Muscle Spasm  bisacodyl Suppository 10 milliGRAM(s) Rectal daily PRN Constipation  diltiazem    Tablet 30 milliGRAM(s) Oral three times a day  enoxaparin Injectable 40 milliGRAM(s) SubCutaneous every 24 hours  levETIRAcetam  Solution 750 milliGRAM(s) Oral two times a day  levothyroxine 75 MICROGram(s) Oral daily  melatonin 3 milliGRAM(s) Oral at bedtime  polyethylene glycol 3350 17 Gram(s) Oral daily PRN Constipation  senna 2 Tablet(s) Oral at bedtime  sodium chloride 3%  Inhalation 4 milliLiter(s) Inhalation every 12 hours  traMADol 50 milliGRAM(s) Oral two times a day PRN Severe Pain (7 - 10)    Labs:  CBC Full  -  ( 11 Sep 2022 18:09 )  WBC Count : x  RBC Count : x  Hemoglobin : 8.8 g/dL  Hematocrit : 29.2 %  Platelet Count - Automated : x  Mean Cell Volume : x  Mean Cell Hemoglobin : x  Mean Cell Hemoglobin Concentration : x  Auto Neutrophil # : x  Auto Lymphocyte # : x  Auto Monocyte # : x  Auto Eosinophil # : x  Auto Basophil # : x  Auto Neutrophil % : x  Auto Lymphocyte % : x  Auto Monocyte % : x  Auto Eosinophil % : x  Auto Basophil % : x    09-11    136  |  100  |  24<H>  ----------------------------<  179<H>  4.1   |  25  |  0.60    Ca    9.0      11 Sep 2022 13:52    Ferritin, Serum: 67 ng/mL (09.11.22 @ 15:12)   Vitamin B12, Serum: >2000 pg/mL (09.11.22 @ 15:12)     Radiology:             ROS:  Patient comfortable without distress on oxygen  No SOB or chest pain  No palpitation  No abdominal pain, diarrhaea or constipation  No weakness of extremities  No skin changes or swelling of legs  Rest of the comprehensive ROS was negative  Vital Signs Last 24 Hrs  T(C): 37.2 (12 Sep 2022 05:37), Max: 37.2 (11 Sep 2022 21:21)  T(F): 99 (12 Sep 2022 05:37), Max: 99 (11 Sep 2022 21:21)  HR: 80 (12 Sep 2022 05:37) (80 - 97)  BP: 113/- (12 Sep 2022 05:37) (113/- - 136/76)  BP(mean): --  RR: 18 (12 Sep 2022 05:37) (18 - 18)  SpO2: 98% (12 Sep 2022 05:37) (97% - 98%)    Parameters below as of 12 Sep 2022 05:37  Patient On (Oxygen Delivery Method): nasal cannula  O2 Flow (L/min): 3      Physical exam:  Patient alert and oriented  No distress  CVS: S1, S2   Chest: bilateral breath sound without rales  Abdomen: soft, not tender, no organomegaly or masses  CNS: Residual left side weakness  Musculoskeletal:  Normal range of motion  Skin: No rash    Assessment and Plan:

## 2022-09-12 NOTE — PROGRESS NOTE ADULT - SUBJECTIVE AND OBJECTIVE BOX
afbeirle    REVIEW OF SYSTEMS:  GEN: no fever,    no chills  RESP: no SOB,   no cough  CVS: no chest pain,   no palpitations  GI: no abdominal pain,   no nausea,   no vomiting,   no constipation,   no diarrhea  : no dysuria,   no frequency  NEURO: no headache,   no dizziness  PSYCH: no depression,   not anxious  Derm : no rash    MEDICATIONS  (STANDING):  albuterol/ipratropium for Nebulization 3 milliLiter(s) Nebulizer every 6 hours  allopurinol 100 milliGRAM(s) Oral daily  anastrozole 1 milliGRAM(s) Oral daily  atorvastatin 20 milliGRAM(s) Oral at bedtime  diltiazem    Tablet 30 milliGRAM(s) Oral three times a day  enoxaparin Injectable 40 milliGRAM(s) SubCutaneous every 24 hours  levETIRAcetam  Solution 750 milliGRAM(s) Oral two times a day  levothyroxine 75 MICROGram(s) Oral daily  melatonin 3 milliGRAM(s) Oral at bedtime  senna 2 Tablet(s) Oral at bedtime  sodium chloride 3%  Inhalation 4 milliLiter(s) Inhalation every 12 hours    MEDICATIONS  (PRN):  acetaminophen     Tablet .. 650 milliGRAM(s) Oral every 6 hours PRN Temp greater or equal to 38C (100.4F), Mild Pain (1 - 3)  baclofen 5 milliGRAM(s) Oral every 12 hours PRN Muscle Spasm  bisacodyl Suppository 10 milliGRAM(s) Rectal daily PRN Constipation  polyethylene glycol 3350 17 Gram(s) Oral daily PRN Constipation  traMADol 50 milliGRAM(s) Oral two times a day PRN Severe Pain (7 - 10)      Vital Signs Last 24 Hrs  T(C): 37.2 (12 Sep 2022 05:37), Max: 37.2 (11 Sep 2022 21:21)  T(F): 99 (12 Sep 2022 05:37), Max: 99 (11 Sep 2022 21:21)  HR: 80 (12 Sep 2022 05:37) (80 - 97)  BP: 113/- (12 Sep 2022 05:37) (113/- - 136/76)  BP(mean): --  RR: 18 (12 Sep 2022 05:37) (18 - 18)  SpO2: 98% (12 Sep 2022 05:37) (96% - 98%)    Parameters below as of 12 Sep 2022 05:37  Patient On (Oxygen Delivery Method): nasal cannula  O2 Flow (L/min): 3    CAPILLARY BLOOD GLUCOSE      POCT Blood Glucose.: 153 mg/dL (12 Sep 2022 06:45)  POCT Blood Glucose.: 167 mg/dL (12 Sep 2022 00:26)    I&O's Summary    11 Sep 2022 07:01  -  12 Sep 2022 07:00  --------------------------------------------------------  IN: 1140 mL / OUT: 0 mL / NET: 1140 mL        PHYSICAL EXAM:  HEAD:  Atraumatic, Normocephalic  NECK: Supple, No   JVD  CHEST/LUNG:   no     rales,     no,    rhonchi  HEART: Regular rate and rhythm;         murmur  ABDOMEN: Soft, Nontender, ;   EXTREMITIES:     no   edema  NEUROLOGY:  alert    LABS:                        8.8    x     )-----------( x        ( 11 Sep 2022 18:09 )             29.2     09-11    136  |  100  |  24<H>  ----------------------------<  179<H>  4.1   |  25  |  0.60    Ca    9.0      11 Sep 2022 13:52                              Consultant(s) Notes Reviewed:      Care Discussed with Consultants/Other Providers:

## 2022-09-12 NOTE — PROGRESS NOTE ADULT - SUBJECTIVE AND OBJECTIVE BOX
CARDIOLOGY     PROGRESS  NOTE   ________________________________________________    CHIEF COMPLAINT:Patient is a 87y old  Female who presents with a chief complaint of SOB (12 Sep 2022 09:34)  doing much better.  	  REVIEW OF SYSTEMS:  CONSTITUTIONAL: No fever, weight loss, or fatigue  EYES: No eye pain, visual disturbances, or discharge  ENT:  No difficulty hearing, tinnitus, vertigo; No sinus or throat pain  NECK: No pain or stiffness  RESPIRATORY: No cough, wheezing, chills or hemoptysis; No Shortness of Breath  CARDIOVASCULAR: No chest pain, palpitations, passing out, dizziness, or leg swelling  GASTROINTESTINAL: No abdominal or epigastric pain. No nausea, vomiting, or hematemesis; No diarrhea or constipation. No melena or hematochezia.  GENITOURINARY: No dysuria, frequency, hematuria, or incontinence  NEUROLOGICAL: No headaches, memory loss, loss of strength, numbness, or tremors  SKIN: No itching, burning, rashes, or lesions   LYMPH Nodes: No enlarged glands  ENDOCRINE: No heat or cold intolerance; No hair loss  MUSCULOSKELETAL: No joint pain or swelling; No muscle, back, or extremity pain  PSYCHIATRIC: No depression, anxiety, mood swings, or difficulty sleeping  HEME/LYMPH: No easy bruising, or bleeding gums  ALLERGY AND IMMUNOLOGIC: No hives or eczema	    [ ] All others negative	  [ ] Unable to obtain    PHYSICAL EXAM:  T(C): 37.2 (09-12-22 @ 05:37), Max: 37.2 (09-11-22 @ 21:21)  HR: 80 (09-12-22 @ 05:37) (80 - 97)  BP: 113/- (09-12-22 @ 05:37) (113/- - 136/76)  RR: 18 (09-12-22 @ 05:37) (18 - 18)  SpO2: 98% (09-12-22 @ 05:37) (96% - 98%)  Wt(kg): --  I&O's Summary    11 Sep 2022 07:01  -  12 Sep 2022 07:00  --------------------------------------------------------  IN: 1140 mL / OUT: 0 mL / NET: 1140 mL        Appearance: Normal	  HEENT:   Normal oral mucosa, PERRL, EOMI	  Lymphatic: No lymphadenopathy  Cardiovascular: Normal S1 S2, No JVD, + murmurs, No edema  Respiratory: rhonchi  Psychiatry: A & O x 3, Mood & affect appropriate  Gastrointestinal:  Soft, Non-tender, + BS	  Skin: No rashes, No ecchymoses, No cyanosis	  Neurologic: Non-focal  Extremities: Normal range of motion, No clubbing, cyanosis or edema  Vascular: Peripheral pulses palpable 2+ bilaterally    MEDICATIONS  (STANDING):  albuterol/ipratropium for Nebulization 3 milliLiter(s) Nebulizer every 6 hours  allopurinol 100 milliGRAM(s) Oral daily  anastrozole 1 milliGRAM(s) Oral daily  atorvastatin 20 milliGRAM(s) Oral at bedtime  diltiazem    Tablet 30 milliGRAM(s) Oral three times a day  enoxaparin Injectable 40 milliGRAM(s) SubCutaneous every 24 hours  levETIRAcetam  Solution 750 milliGRAM(s) Oral two times a day  levothyroxine 75 MICROGram(s) Oral daily  melatonin 3 milliGRAM(s) Oral at bedtime  senna 2 Tablet(s) Oral at bedtime  sodium chloride 3%  Inhalation 4 milliLiter(s) Inhalation every 12 hours      TELEMETRY: 	    ECG:  	  RADIOLOGY:  OTHER: 	  	  LABS:	 	    CARDIAC MARKERS:                                8.8    x     )-----------( x        ( 11 Sep 2022 18:09 )             29.2     09-11    136  |  100  |  24<H>  ----------------------------<  179<H>  4.1   |  25  |  0.60    Ca    9.0      11 Sep 2022 13:52      proBNP: Serum Pro-Brain Natriuretic Peptide: 461 pg/mL (09-08 @ 20:56)  Serum Pro-Brain Natriuretic Peptide: 155 pg/mL (08-27 @ 21:06)    Lipid Profile:   HgA1c:   TSH:         Assessment and plan  ---------------------------  86F w/ extensive hx including severe tracheobronchomalacia (c/b hypoxia 2/2 mucous plugging and recurrent L lung collapse), 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 Very limited hx obtained from pt given mental status, dyspnea, and use of BIPAP. Unable to reach sonSy. History obtained from staff/chart review.    Per ED staff who saw pt when she first arrived, pt was notably dyspneic with increased WOB and wheezing. Supposedly missed use of nightly BIPAP at rehab. Reportedly denied fever and cough. On encounter for admission, pt with BIPAP mask on, appearing slightly lethargic and mildly dyspneic, but was able to answer some questions. Pt endorsed having SOB. Denied pain. Seemed to believe initially that she was at [facility]? but was difficult to understand her responses.    Of note, pt has multiple recent admissions with similar presentation. Most recently was hospitalized from 8/28/22-9/8/22 for SOB, treated with airway clearance and completed 5-day course of Zosyn for empiric coverage of PNA (though per Pulm, unlikely to have PNA). Course c/b PEG displacement and subsequent reinsertion by IR on 8/30. In addition to her usual PEG feeds, pt was also started on puree diet with moderately thickened fluids for pleasure feeds. Also was started on anastrozole for most likely dx of metastatic breast cancer to bones (pt was unable to tolerate further cancer workup of spine lesions). Pt remained full code during recent admissions.  pt with metastatic ca to the bone with sob sec to obesity and underlying lung and cardiac disease  increase sob sec to missing BiPAP at night  may consider pulmonary eval  may consider hospice care/ palliative care  continue Diltiazem  dvt prophylaxis  pt will need home o2 and Bipap at night  discussed with family at the bed side , dc planning

## 2022-09-12 NOTE — DIETITIAN INITIAL EVALUATION ADULT - ADD RECOMMEND
- monitor glucose per elevated values ( 9/12); adjust EN as needed/indicated  - Nutrition care plan to remain consistent with pt goals of care  - RD remains available to review diet education and adjust diet recommendations as needed.

## 2022-09-12 NOTE — DIETITIAN INITIAL EVALUATION ADULT - CONTINUE CURRENT NUTRITION CARE PLAN
- continue current diet regimen   - Defer diet/fluid consistencies to medical team/SLP recommendations; consider speech eval as able to assess ability/yes

## 2022-09-12 NOTE — DIETITIAN INITIAL EVALUATION ADULT - PERTINENT MEDS FT
MEDICATIONS  (STANDING):  albuterol/ipratropium for Nebulization 3 milliLiter(s) Nebulizer every 6 hours  allopurinol 100 milliGRAM(s) Oral daily  anastrozole 1 milliGRAM(s) Oral daily  atorvastatin 20 milliGRAM(s) Oral at bedtime  diltiazem    Tablet 30 milliGRAM(s) Oral three times a day  enoxaparin Injectable 40 milliGRAM(s) SubCutaneous every 24 hours  levETIRAcetam  Solution 750 milliGRAM(s) Oral two times a day  levothyroxine 75 MICROGram(s) Oral daily  melatonin 3 milliGRAM(s) Oral at bedtime  senna 2 Tablet(s) Oral at bedtime  sodium chloride 3%  Inhalation 4 milliLiter(s) Inhalation every 12 hours    MEDICATIONS  (PRN):  acetaminophen     Tablet .. 650 milliGRAM(s) Oral every 6 hours PRN Temp greater or equal to 38C (100.4F), Mild Pain (1 - 3)  baclofen 5 milliGRAM(s) Oral every 12 hours PRN Muscle Spasm  bisacodyl Suppository 10 milliGRAM(s) Rectal daily PRN Constipation  polyethylene glycol 3350 17 Gram(s) Oral daily PRN Constipation  traMADol 50 milliGRAM(s) Oral two times a day PRN Severe Pain (7 - 10)

## 2022-09-12 NOTE — DIETITIAN INITIAL EVALUATION ADULT - OTHER CALCULATIONS
Dosing wt 205.5 pounds used for calorie and fluid needs calculation; IBW+10% (BMI 35.3) pounds for protein needs calculations. Defer fluids to team

## 2022-09-12 NOTE — DIETITIAN INITIAL EVALUATION ADULT - REASON INDICATOR FOR ASSESSMENT
Initial Assessment warranted for tube feeding   Information obtained from EMR, attempted to call pt's son Sy, no answer at this time.   pt non verbal/non arousal unable to interview at this time

## 2022-09-12 NOTE — CONSULT NOTE ADULT - SUBJECTIVE AND OBJECTIVE BOX
[facility] - [facility] - CONSULT NOTE    Informed by the patient's son about his mother's immediate readmission following her discharge to [facility] rehab. 87 year old gentlewoman, lifelong non-smoker, well known to me without history of intrinsic lung disease. 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 She also has a history of HTN, HLD, CAD s/p MI with preserved LVEF and moderate aortic stenosis. The patient was admitted to [place] in December 2021 with fever and abdominal pain due to perforated appendicitis. She was treated with tube drainage and antibiotics for a polymicrobial infection. Hospital course was complicated by respiratory failure due to mucous plugging with complete collapse of the left lung. She underwent several bronchoscopies for pulmonary toilet and was found to have severe tracheobronchomalacia. Her respiratory status has remained "stable" while at [facility] on nebulized bronchodilators and hypertonic saline and without nocturnal NIV. She was admitted in March with hematochezia. The left lower lobe was well aerated on a CT scan of the abdomen and pelvis with only bibasilar subsegmental atelectasis - there was scattered sigmoid diverticulosis without evidence of diverticulitis - interval decrease in size of a fluid collection in the region of the previously perforated appendicitis measuring 2.8 x 1.5 cm previously measuring 4.0 x 2.2 cm - slight increase in mild adjacent inflammatory change. The GI bleed was treated conservatively.  She was admitted on 8/29 with shortness of breath in the setting of back, neck and left lower extremity pain. She required NIV for mild acute hypercapnic respiratory failure that quickly resolved. Her respiratory status remained stable on room air and on her usual nebs and mucolytics. She was started on a puree diet with moderately thickened fluids in addition to PEG feeds. She was started on anastrazole for likely metastatic breast cancer to the bone. On the day of discharge, the patient was quite anxious with shortness of breath, chest congestion and wheeze - she was making a grunting noise with expiration. She was returned from [facility] that evening.     PMHX:  Tracheobronchomalacia  Mucous plugging with left lung atelectasis  Asbestos exposure  HTN (hypertension)  HLD (dyslipidemia)  CAD (coronary artery disease)  MI (myocardial infarction)  Aortic stenosis  CVA (cerebral vascular accident) - left sided weakness - dysphagia - bedbound  Gout  UTI (lower urinary tract infection)  Breast cancer    PSHX:  Bronchoscopy - multiple  Shoulder fracture repair - left  Mastectomy - right - 2019    FAMILY HISTORY:  No pertinent family history in first degree relatives    SOCIAL HISTORY:  lifelong non-smoker; former EtOH overuse; bedbound; has been residing at [facility]      Pulmonary Medications:   albuterol/ipratropium for Nebulization 3 milliLiter(s) Nebulizer every 6 hours  sodium chloride 3%  Inhalation 4 milliLiter(s) Inhalation every 12 hours    Antimicrobials:    Cardiology:  diltiazem    Tablet 30 milliGRAM(s) Oral three times a day    Other:  allopurinol 100 milliGRAM(s) Oral daily  anastrozole 1 milliGRAM(s) Oral daily  atorvastatin 20 milliGRAM(s) Oral at bedtime  enoxaparin Injectable 40 milliGRAM(s) SubCutaneous every 24 hours  levETIRAcetam  Solution 750 milliGRAM(s) Oral two times a day  levothyroxine 75 MICROGram(s) Oral daily  melatonin 3 milliGRAM(s) Oral at bedtime  senna 2 Tablet(s) Oral at bedtime    Prn:  MEDICATIONS  (PRN):  acetaminophen     Tablet .. 650 milliGRAM(s) Oral every 6 hours PRN Temp greater or equal to 38C (100.4F), Mild Pain (1 - 3)  baclofen 5 milliGRAM(s) Oral every 12 hours PRN Muscle Spasm  bisacodyl Suppository 10 milliGRAM(s) Rectal daily PRN Constipation  polyethylene glycol 3350 17 Gram(s) Oral daily PRN Constipation  traMADol 50 milliGRAM(s) Oral two times a day PRN Severe Pain (7 - 10)      Allergies    Toradol (Urticaria (Mild to Mod); Rash (Mild to Mod))    HOME MEDICATIONS: see  H & P    REVIEW OF SYSTEMS:  Constitutional: As per HPI  HEENT: Within normal limits  CV: As per HPI  Resp: As per HPI  GI: Within normal limits   : Within normal limits  Musculoskeletal: Within normal limits  Skin: Within normal limits  Neurological: Within normal limits  Psychiatric: Within normal limits  Endocrine: Within normal limits  Hematologic/Lymphatic: Within normal limits  Allergic/Immunologic: Within normal limits    [x] All other systems negative    OBJECTIVE:    POCT Blood Glucose.: 153 mg/dL (12 Sep 2022 06:45)  POCT Blood Glucose.: 167 mg/dL (12 Sep 2022 00:26)      PHYSICAL EXAM:  ICU Vital Signs Last 24 Hrs  T(C): 37.2 (12 Sep 2022 05:37), Max: 37.2 (11 Sep 2022 21:21)  T(F): 99 (12 Sep 2022 05:37), Max: 99 (11 Sep 2022 21:21)  HR: 80 (12 Sep 2022 05:37) (80 - 97)  BP: 113/- (12 Sep 2022 05:37) (113/- - 136/76)  BP(mean): --  ABP: --  ABP(mean): --  RR: 18 (12 Sep 2022 05:37) (18 - 18)  SpO2: 98% (12 Sep 2022 05:37) (96% - 98%) on 3lpm nasal canula    General: Awake. Alert. Cooperative. No distress. Appears stated age 	  HEENT:   Atraumatic. Normocephalic. Anicteric. Normal oral mucosa. PERRL. EOMI.  Neck: Supple. Trachea midline. Thyroid without enlargement/tenderness/nodules. No carotid bruit. No JVD.	  Cardiovascular: Regular rate and rhythm. S1 S2 normal. No murmurs, rubs or gallops.  Respiratory: Respirations unlabored. Clear to auscultation and percussion bilaterally. No curvature.  Abdomen: Soft. Non-tender. Non-distended. No organomegaly. No masses. Normal bowel sounds.  Extremities: Warm to touch. No clubbing or cyanosis. No pedal edema.  Pulses: 2+ peripheral pulses all extremities.	  Skin: Normal skin color. No rashes or lesions. No ecchymoses. No cyanosis. Warm to touch.  Lymph Nodes: Cervical, supraclavicular and axillary nodes normal  Neurological: Motor and sensory examination equal and normal. A and O x 3  Psychiatry: Appropriate mood and affect.      LABS:                          8.8    x     )-----------( x        ( 11 Sep 2022 18:09 )             29.2     CBC    WBC  7.95 <==, 7.60 <==, 11.07 <==, 6.00 <==    Hemoglobin  8.8 <<==, 7.2 <<==, 8.2 <<==, 8.3 <<==, 8.2 <<==    Hematocrit  29.2 <==, 24.2 <==, 26.7 <==, 27.3 <==, 27.9 <==    Platelets  252 <==, 223 <==, 245 <==, 227 <==      136  |  100  |  24<H>  ----------------------------<  179<H>    09-11  4.1   |  25  |  0.60    LYTES    sodium  136 <==, 136 <==, 135 <==, 136 <==    potassium   4.1 <==, 5.0 <==, 4.9 <==, 5.1 <==    chloride  100 <==, 99 <==, 96 <==, 98 <==    carbon dioxide  25 <==, 22 <==, 27 <==, 29 <==    =============================================================================================  RENAL FUNCTION:    Creatinine:   0.60  <<==, 0.63  <<==, 0.68  <<==, 0.54  <<==    BUN:   24 <==, 25 <==, 27 <==, 20 <==    ============================================================================================    calcium   9.0 <==, 9.3 <==, 9.8 <==, 9.4 <==    phos   3.7 <==, 3.5 <==    mag   2.2 <==, 2.1 <==, 1.8 <==    ============================================================================================  LFTs    AST:   14 <== , 18 <==     ALT:  13  <== , 13  <==     AP:  156  <=, 176  <=    Bili:  0.4  <=, 0.4  <=    PT/INR - ( 08 Sep 2022 20:56 )   PT: 13.0 sec;   INR: 1.13 ratio      Procalcitonin, Serum: 0.40 ng/mL (09-09 @ 05:58)    Serum Pro-Brain Natriuretic Peptide: 461 pg/mL (09-08 @ 20:56)    Blood Gas Profile - Venous (09.08.22 @ 20:31)   pH, Venous: 7.37   pCO2, Venous: 53 mmHg   pO2, Venous: 34 mmHg   HCO3, Venous: 31 mmol/L   Base Excess, Venous: 4.6 mmol/L   Oxygen Saturation, Venous: 58.4 %   Total CO2, Venous: 32 mmol/L   Blood Gas Source Venous: Venous     Blood Gas Profile - Venous (09.02.22 @ 09:03)   pH, Venous: 7.31  pCO2, Venous: 51 mmHg   pO2, Venous: 45 mmHg   HCO3, Venous: 26 mmol/L   Base Excess, Venous: -1.2 mmol/L   Oxygen Saturation, Venous: 73.4 %   Total CO2, Venous: 27 mmol/L   Blood Gas Source Venous: Venous     CARDIAC MARKERS ( 08 Sep 2022 21:37 )  CPK x     /CKMB x     /CKMB Units x        troponin 32 ng/L    CARDIAC MARKERS ( 08 Sep 2022 20:56 )  CPK x     /CKMB x     /CKMB Units x        troponin 32 ng/L    < from: TTE with Doppler (w/Cont) (01.21.22 @ 14:08) >    Patient name: FRANKI MEHTA  YOB: 1935   Age: 86 (F)   MR#: [id number]  Study Date: 1/21/2022  ------------------------------------------------------------------------  Dimensions:    Normal Values:  LA:     2.5    2.0 - 4.0 cm  Ao:     3.3    2.0 - 3.8 cm  SEPTUM: 1.6    0.6 - 1.2 cm  PWT:    0.9    0.6 - 1.1 cm  LVIDd:  3.6    3.0 - 5.6 cm  LVIDs:  2.5    1.8 - 4.0 cm  Derived variables:  LVMI: 77 g/m2  RWT: 0.50  Fractional short: 31 %  EF (Visual Estimate): 70-75 %  ------------------------------------------------------------------------  Conclusions:  1. Concentric left ventricular hypertrophy.  2. Hyperdynamic left ventricular systolic function.  Endocardial visualization enhanced with intravenous  injection of Ultrasonic Enhancing Agent (Definity).  3. The right ventricle is not well visualized; grossly  normal right ventricular systolic function.  Pt refused to proceed with exam.  Limited Doppler study.  No trans aortic gradients.  Unable to rule out endocarditis.  ------------------------------------------------------------------------  Confirmed on 1/21/2022 - 15:42:57 by COMPA Castillo  ------------------------------------------------------------------------  ---------------------------------------------------------------------------------------------------------------      MICROBIOLOGY:     Respiratory Viral Panel with COVID-19 by RYAN (09.08.22 @ 21:38)   Rapid RVP Result: [facility]   SARS-CoV-2: [facility]  This Respiratory Panel uses polymerase chain reaction (PCR) to detect for   adenovirus; coronavirus (HKU1, NL63, 229E, OC43); human metapneumovirus   (hMPV); human enterovirus/rhinovirus (Entero/RV); influenza A; influenza   A/H1; influenza A/H3; influenza A/H1-2009; influenza B; parainfluenza   viruses 1, 2, 3, 4; respiratory syncytial virus; Mycoplasma pneumoniae;   Chlamydophila pneumoniae; and SARS-CoV-2.     Culture - Blood (09.08.22 @ 20:39)   Specimen Source: .Blood Blood-Peripheral   Culture Results:   No growth to date.     Culture - Blood (09.08.22 @ 20:25)   Specimen Source: .Blood Blood-Peripheral   Culture Results:   No growth to date.     RADIOLOGY:  [x ] Chest radiographs reviewed and interpreted by me    EXAM:  XR CHEST AP OR PA 1V                          PROCEDURE DATE:  09/08/2022      FINDINGS:  The patient's chin limits evaluation of the apices.  Gastrostomy tube.  The heart is poorly assessed on this projection..  Low lung volumes. Patchy atelectasis in the left lower lobe. No focal   opacities.  No pleural effusion.    IMPRESSION:  Low lung volumes and atelectasis.    LESLI DUNCAN MD; Resident Radiologist  This document has been electronically signed.  DIANE CLARK MD; Attending Radiologist  Thisdocument has been electronically signed. Sep  9 2022 10:39AM  ---------------------------------------------------------------------------------------------------------------  EXAM:  DUPLEX SCAN EXT VEINS LOWER BI                          PROCEDURE DATE:  09/10/2022      IMPRESSION:  Limited study  No evidence of deep venous thrombosis in either lower extremity.  Limited visualization of the calf veins.    KEYLA ROMERO MD; Attending Radiologist  This document has been electronically signed. Sep 10 2022 10:42AM  ---------------------------------------------------------------------------------------------------------------  EXAM:  CT CHEST                          PROCEDURE DATE:  08/29/2022      FINDINGS:    LYMPH NODES: No mediastinal lymphadenopathy.    HEART/VASCULATURE: The heart is normal in size. No pericardial effusion.   Aortic valve calcifications noted.    AIRWAYS/LUNGS/PLEURA: Patent central airways. Right lower lobe dependent   atelectasis appear similar to prior. Patchy groundglass opacities the   right apex are improved. Interval resolution of left lower lobe complete   collapse. There is mild dependent atelectasis of the left upper and lower   lobes.    UPPER ABDOMEN: Gallbladder stones.    BONES/SOFT TISSUES: Status post right mastectomy. Status post left   humerus ORIF. Marked kyphosis. Multiple lytic bone lesions predominantly   in the posterior and lateral thoracic vertebral bodies. It is unclear if   there is any spinal canal extension. Lytic lesions are alsoseen at the   right posterior seventh rib and right humeral head    OTHER: Right maxillary sinus is opacified.      IMPRESSION:  Bilateral dependent atelectasis. Interval resolution of left lower lobe   complete collapse.    Multiple lytic bone lesions, predominantly in the thoracic vertebral   bodies which is concerning for metastatic disease. Unclear if there is   any spinal canal extension. Recommend MRI of the thoracic spine for   further evaluation.    BAILEY STARR MD; Resident Radiologist  This document has been electronically signed.  HAY IRVIN MD; Attending Radiologist  This document has been electronically signed. Aug 29 2022 12:01PM  ---------------------------------------------------------------------------------------------------------------  EXAM:  CT ABDOMEN AND PELVIS IC                          PROCEDURE DATE:  08/31/2022      IMPRESSION:    Multiple lytic lesions are seen throughout the axial and appendicular   skeleton, some of which appear increased in size from CT abdomen pelvis   3/16/2022 when evaluated in retrospect. A few lytic lesions involve the   left intertrochanteric region and right acetabulum.    Endometrial thickening. Recommend dedicated pelvic sonogram.    Cystic lesions are seen within the pancreas. Recommend a dedicated MRI on   a nonemergent basis.    SULY KENDRICK MD; Resident Radiology  This document has been electronically signed.  BRITTANI MALCOLM MD; Attending Radiologist  This document has been electronically signed. Sep  1 2022 11:01AM  ---------------------------------------------------------------------------------------------------------------  EXAM:  XR HUMERUS MIN 2 VIEWS RT                          PROCEDURE DATE:  08/30/2022      EXAM:  Internal/external rotation AP right humerus from 8/30/2022 at 0927.   Compared to appearance on previous day chest CT.    IMPRESSION:  Generalized osteopenia. Redemonstrated focal lytic lesion in proximal   medial humeral diaphysis with small area of permeative cortical   destruction. No additional radiographically appreciated suspicious lytic   or blastic lesions in remaining imaged regions.    No current fractures or dislocations.    Preserved shoulder and elbow joint spaces.    IV cannula with attached tubing overlies upper arm.    MINE NICE MD; Attending Radiologist  This document has been electronically signed. Aug 30 2022 10:39AM  ---------------------------------------------------------------------------------------------------------------  EXAM:  DUPLEX EXT VEINS UPPER LT                          PROCEDURE DATE:  09/01/2022      IMPRESSION:  No evidence of left upper extremity deep venous thrombosis.    FAWN FITZGERALD MD; Attending Radiologist  This document has been electronically signed. Sep  1 2022  1:10PM  ---------------------------------------------------------------------------------------------------------------  EXAM:  DUPLEX SCAN EXT VEINS LOWER BI                          PROCEDURE DATE:  08/31/2022      IMPRESSION:  No evidence of deep venous thrombosis in either lower extremity.    KEYLA ROMERO MD; Attending Radiologist  This document has been electronically signed. Aug 31 2022  7:59PM  ---------------------------------------------------------------------------         [facility] - [facility] - CONSULT NOTE    Informed by the patient's son about his mother's immediate readmission following her discharge to [facility] rehab on 9/9. 87 year old gentlewoman, lifelong non-smoker, well known to me without history of intrinsic lung disease. 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 She also has a history of HTN, HLD, CAD s/p MI with preserved LVEF and moderate aortic stenosis. The patient was admitted to [place] in December 2021 with fever and abdominal pain due to perforated appendicitis. She was treated with tube drainage and antibiotics for a polymicrobial infection. Hospital course was complicated by respiratory failure due to mucous plugging with complete collapse of the left lung. She underwent several bronchoscopies for pulmonary toilet and was found to have severe tracheobronchomalacia. Her respiratory status has remained "stable" while at [facility] on nebulized bronchodilators and hypertonic saline and without nocturnal NIV. She was admitted in March with hematochezia. The left lower lobe was well aerated on a CT scan of the abdomen and pelvis with only bibasilar subsegmental atelectasis - there was scattered sigmoid diverticulosis without evidence of diverticulitis - interval decrease in size of a fluid collection in the region of the previously perforated appendicitis measuring 2.8 x 1.5 cm previously measuring 4.0 x 2.2 cm - slight increase in mild adjacent inflammatory change. The GI bleed was treated conservatively.  She was admitted on 8/29 with shortness of breath in the setting of back, neck and left lower extremity pain. She required NIV for mild acute hypercapnic respiratory failure that quickly resolved. Her respiratory status remained stable on room air and on her usual nebs and mucolytics. She was started on a puree diet with moderately thickened fluids in addition to PEG feeds following evaluation by the speech and swallow team. She was started on anastrazole for likely metastatic breast cancer to the bone. On the day of discharge, the patient was quite anxious with shortness of breath, chest congestion and wheeze - she was making a grunting noise with expiration. She was returned from [facility] that evening. Currently she is tachypneic and using accessory muscles of respiration. She has an audible wheeze and cough productive of scant sputum. No fevers, chills or sweats. No chest pain/pressure or palpitations.       PMHX:  Tracheobronchomalacia  Mucous plugging with left lung atelectasis  Asbestos exposure  HTN (hypertension)  HLD (dyslipidemia)  CAD (coronary artery disease)  MI (myocardial infarction)  Aortic stenosis  CVA (cerebral vascular accident) - left sided weakness - dysphagia - bedbound  Gout  UTI (lower urinary tract infection)  Breast cancer    PSHX:  Bronchoscopy - multiple  Shoulder fracture repair - left  Mastectomy - right - 2019    FAMILY HISTORY:  No pertinent family history in first degree relatives    SOCIAL HISTORY:  lifelong non-smoker; former EtOH overuse; bedbound; has been residing at [facility]      Pulmonary Medications:   albuterol/ipratropium for Nebulization 3 milliLiter(s) Nebulizer every 6 hours  sodium chloride 3%  Inhalation 4 milliLiter(s) Inhalation every 12 hours    Antimicrobials:    Cardiology:  diltiazem    Tablet 30 milliGRAM(s) Oral three times a day    Other:  allopurinol 100 milliGRAM(s) Oral daily  anastrozole 1 milliGRAM(s) Oral daily  atorvastatin 20 milliGRAM(s) Oral at bedtime  enoxaparin Injectable 40 milliGRAM(s) SubCutaneous every 24 hours  levETIRAcetam  Solution 750 milliGRAM(s) Oral two times a day  levothyroxine 75 MICROGram(s) Oral daily  melatonin 3 milliGRAM(s) Oral at bedtime  senna 2 Tablet(s) Oral at bedtime    Prn:  MEDICATIONS  (PRN):  acetaminophen     Tablet .. 650 milliGRAM(s) Oral every 6 hours PRN Temp greater or equal to 38C (100.4F), Mild Pain (1 - 3)  baclofen 5 milliGRAM(s) Oral every 12 hours PRN Muscle Spasm  bisacodyl Suppository 10 milliGRAM(s) Rectal daily PRN Constipation  polyethylene glycol 3350 17 Gram(s) Oral daily PRN Constipation  traMADol 50 milliGRAM(s) Oral two times a day PRN Severe Pain (7 - 10)      Allergies    Toradol (Urticaria (Mild to Mod); Rash (Mild to Mod))    HOME MEDICATIONS: see  H & P    REVIEW OF SYSTEMS:  Constitutional: As per HPI  HEENT: Within normal limits  CV: As per HPI  Resp: As per HPI  GI: dysphagia  : Within normal limits  Musculoskeletal: Within normal limits  Skin: Within normal limits  Neurological: CVA - left sided plegia  Psychiatric: Within normal limits  Endocrine: Within normal limits  Hematologic/Lymphatic: Within normal limits  Allergic/Immunologic: Within normal limits    [x] All other systems negative    OBJECTIVE:    POCT Blood Glucose.: 153 mg/dL (12 Sep 2022 06:45)  POCT Blood Glucose.: 167 mg/dL (12 Sep 2022 00:26)      PHYSICAL EXAM:  ICU Vital Signs Last 24 Hrs  T(C): 37.2 (12 Sep 2022 05:37), Max: 37.2 (11 Sep 2022 21:21)  T(F): 99 (12 Sep 2022 05:37), Max: 99 (11 Sep 2022 21:21)  HR: 80 (12 Sep 2022 05:37) (80 - 97)  BP: 113/- (12 Sep 2022 05:37) (113/- - 136/76)  BP(mean): --  ABP: --  ABP(mean): --  RR: 18 (12 Sep 2022 05:37) (18 - 18)  SpO2: 98% (12 Sep 2022 05:37) (96% - 98%) on 3lpm nasal canula    General: Awake. Alert. Mildly confused. Cooperative. Using accessory muscles or respiration. Appears stated age. Obese. Bedbound. Grunting.  HEENT: Atraumatic. Normocephalic. Anicteric. Normal oral mucosa. PERRL. EOMI. Mallampati class IV airway.  Neck: Supple. Trachea midline. Thyroid without enlargement/tenderness/nodules. No carotid bruit. No JVD. Short and wide.  Cardiovascular: Regular rate and rhythm. S1 S2 normal. III/VI systolic murmur  Respiratory: Abdominal breathing. Diffuse course breath sounds and wheeze. Kyphosis. Poor chest wall excursion  Abdomen: Soft. Non-tender. Non-distended. No organomegaly. No masses. Normal bowel sounds. Obese. PEG.  Extremities: Warm to touch. No clubbing or cyanosis. No pedal edema. Large legs. Left leg contracted under the right leg  Pulses: 2+ peripheral pulses all extremities.	  Skin: Normal skin color. No rashes or lesions. No ecchymoses. No cyanosis. Warm to touch.  Lymph Nodes: Cervical, supraclavicular and axillary nodes normal  Neurological: Left lower extremity plegia with contraction. Left upper extremity is quite weak. A and O x 3  Psychiatry: Anxious.      LABS:                          8.8    x     )-----------( x        ( 11 Sep 2022 18:09 )             29.2     CBC    WBC  7.95 <==, 7.60 <==, 11.07 <==, 6.00 <==    Hemoglobin  8.8 <<==, 7.2 <<==, 8.2 <<==, 8.3 <<==, 8.2 <<==    Hematocrit  29.2 <==, 24.2 <==, 26.7 <==, 27.3 <==, 27.9 <==    Platelets  252 <==, 223 <==, 245 <==, 227 <==      136  |  100  |  24<H>  ----------------------------<  179<H>    09-11  4.1   |  25  |  0.60    LYTES    sodium  136 <==, 136 <==, 135 <==, 136 <==    potassium   4.1 <==, 5.0 <==, 4.9 <==, 5.1 <==    chloride  100 <==, 99 <==, 96 <==, 98 <==    carbon dioxide  25 <==, 22 <==, 27 <==, 29 <==    =============================================================================================  RENAL FUNCTION:    Creatinine:   0.60  <<==, 0.63  <<==, 0.68  <<==, 0.54  <<==    BUN:   24 <==, 25 <==, 27 <==, 20 <==    ============================================================================================    calcium   9.0 <==, 9.3 <==, 9.8 <==, 9.4 <==    phos   3.7 <==, 3.5 <==    mag   2.2 <==, 2.1 <==, 1.8 <==    ============================================================================================  LFTs    AST:   14 <== , 18 <==     ALT:  13  <== , 13  <==     AP:  156  <=, 176  <=    Bili:  0.4  <=, 0.4  <=    PT/INR - ( 08 Sep 2022 20:56 )   PT: 13.0 sec;   INR: 1.13 ratio      Procalcitonin, Serum: 0.40 ng/mL (09-09 @ 05:58)    Serum Pro-Brain Natriuretic Peptide: 461 pg/mL (09-08 @ 20:56)    Blood Gas Profile - Venous (09.08.22 @ 20:31)   pH, Venous: 7.37   pCO2, Venous: 53 mmHg   pO2, Venous: 34 mmHg   HCO3, Venous: 31 mmol/L   Base Excess, Venous: 4.6 mmol/L   Oxygen Saturation, Venous: 58.4 %   Total CO2, Venous: 32 mmol/L   Blood Gas Source Venous: Venous     Blood Gas Profile - Venous (09.02.22 @ 09:03)   pH, Venous: 7.31  pCO2, Venous: 51 mmHg   pO2, Venous: 45 mmHg   HCO3, Venous: 26 mmol/L   Base Excess, Venous: -1.2 mmol/L   Oxygen Saturation, Venous: 73.4 %   Total CO2, Venous: 27 mmol/L   Blood Gas Source Venous: Venous     CARDIAC MARKERS ( 08 Sep 2022 21:37 )  CPK x     /CKMB x     /CKMB Units x        troponin 32 ng/L    CARDIAC MARKERS ( 08 Sep 2022 20:56 )  CPK x     /CKMB x     /CKMB Units x        troponin 32 ng/L    < from: TTE with Doppler (w/Cont) (01.21.22 @ 14:08) >    Patient name: FRANKI MEHTA  YOB: 1935   Age: 86 (F)   MR#: [id number]  Study Date: 1/21/2022  ------------------------------------------------------------------------  Dimensions:    Normal Values:  LA:     2.5    2.0 - 4.0 cm  Ao:     3.3    2.0 - 3.8 cm  SEPTUM: 1.6    0.6 - 1.2 cm  PWT:    0.9    0.6 - 1.1 cm  LVIDd:  3.6    3.0 - 5.6 cm  LVIDs:  2.5    1.8 - 4.0 cm  Derived variables:  LVMI: 77 g/m2  RWT: 0.50  Fractional short: 31 %  EF (Visual Estimate): 70-75 %  ------------------------------------------------------------------------  Conclusions:  1. Concentric left ventricular hypertrophy.  2. Hyperdynamic left ventricular systolic function.  Endocardial visualization enhanced with intravenous  injection of Ultrasonic Enhancing Agent (Definity).  3. The right ventricle is not well visualized; grossly  normal right ventricular systolic function.  Pt refused to proceed with exam.  Limited Doppler study.  No trans aortic gradients.  Unable to rule out endocarditis.  ------------------------------------------------------------------------  Confirmed on 1/21/2022 - 15:42:57 by Kole Mfcarland M.D.  FASE  ------------------------------------------------------------------------  ---------------------------------------------------------------------------------------------------------------      MICROBIOLOGY:     Respiratory Viral Panel with COVID-19 by RYAN (09.08.22 @ 21:38)   Rapid RVP Result: [facility]   SARS-CoV-2: [facility]  This Respiratory Panel uses polymerase chain reaction (PCR) to detect for   adenovirus; coronavirus (HKU1, NL63, 229E, OC43); human metapneumovirus   (hMPV); human enterovirus/rhinovirus (Entero/RV); influenza A; influenza   A/H1; influenza A/H3; influenza A/H1-2009; influenza B; parainfluenza   viruses 1, 2, 3, 4; respiratory syncytial virus; Mycoplasma pneumoniae;   Chlamydophila pneumoniae; and SARS-CoV-2.     Culture - Blood (09.08.22 @ 20:39)   Specimen Source: .Blood Blood-Peripheral   Culture Results:   No growth to date.     Culture - Blood (09.08.22 @ 20:25)   Specimen Source: .Blood Blood-Peripheral   Culture Results:   No growth to date.     RADIOLOGY:  [x ] Chest radiographs reviewed and interpreted by me    EXAM:  XR CHEST AP OR PA 1V                          PROCEDURE DATE:  09/08/2022      FINDINGS:  The patient's chin limits evaluation of the apices.  Gastrostomy tube.  The heart is poorly assessed on this projection..  Low lung volumes. Patchy atelectasis in the left lower lobe. No focal   opacities.  No pleural effusion.    IMPRESSION:  Low lung volumes and atelectasis.    LESLI DUNCAN MD; Resident Radiologist  This document has been electronically signed.  DIANE CLARK MD; Attending Radiologist  Thisdocument has been electronically signed. Sep  9 2022 10:39AM  ---------------------------------------------------------------------------------------------------------------  EXAM:  DUPLEX SCAN EXT VEINS LOWER BI                          PROCEDURE DATE:  09/10/2022      IMPRESSION:  Limited study  No evidence of deep venous thrombosis in either lower extremity.  Limited visualization of the calf veins.    KEYLA ROMERO MD; Attending Radiologist  This document has been electronically signed. Sep 10 2022 10:42AM  ---------------------------------------------------------------------------------------------------------------  EXAM:  CT CHEST                          PROCEDURE DATE:  08/29/2022      FINDINGS:    LYMPH NODES: No mediastinal lymphadenopathy.    HEART/VASCULATURE: The heart is normal in size. No pericardial effusion.   Aortic valve calcifications noted.    AIRWAYS/LUNGS/PLEURA: Patent central airways. Right lower lobe dependent   atelectasis appear similar to prior. Patchy groundglass opacities the   right apex are improved. Interval resolution of left lower lobe complete   collapse. There is mild dependent atelectasis of the left upper and lower   lobes.    UPPER ABDOMEN: Gallbladder stones.    BONES/SOFT TISSUES: Status post right mastectomy. Status post left   humerus ORIF. Marked kyphosis. Multiple lytic bone lesions predominantly   in the posterior and lateral thoracic vertebral bodies. It is unclear if   there is any spinal canal extension. Lytic lesions are alsoseen at the   right posterior seventh rib and right humeral head    OTHER: Right maxillary sinus is opacified.      IMPRESSION:  Bilateral dependent atelectasis. Interval resolution of left lower lobe   complete collapse.    Multiple lytic bone lesions, predominantly in the thoracic vertebral   bodies which is concerning for metastatic disease. Unclear if there is   any spinal canal extension. Recommend MRI of the thoracic spine for   further evaluation.    BAILEY STARR MD; Resident Radiologist  This document has been electronically signed.  HAY IRVIN MD; Attending Radiologist  This document has been electronically signed. Aug 29 2022 12:01PM  ---------------------------------------------------------------------------------------------------------------  EXAM:  CT ABDOMEN AND PELVIS IC                          PROCEDURE DATE:  08/31/2022      IMPRESSION:    Multiple lytic lesions are seen throughout the axial and appendicular   skeleton, some of which appear increased in size from CT abdomen pelvis   3/16/2022 when evaluated in retrospect. A few lytic lesions involve the   left intertrochanteric region and right acetabulum.    Endometrial thickening. Recommend dedicated pelvic sonogram.    Cystic lesions are seen within the pancreas. Recommend a dedicated MRI on   a nonemergent basis.    SULY KENDRICK MD; Resident Radiology  This document has been electronically signed.  BRITTANI MALCOLM MD; Attending Radiologist  This document has been electronically signed. Sep  1 2022 11:01AM  ---------------------------------------------------------------------------------------------------------------  EXAM:  XR HUMERUS MIN 2 VIEWS RT                          PROCEDURE DATE:  08/30/2022      EXAM:  Internal/external rotation AP right humerus from 8/30/2022 at 0927.   Compared to appearance on previous day chest CT.    IMPRESSION:  Generalized osteopenia. Redemonstrated focal lytic lesion in proximal   medial humeral diaphysis with small area of permeative cortical   destruction. No additional radiographically appreciated suspicious lytic   or blastic lesions in remaining imaged regions.    No current fractures or dislocations.    Preserved shoulder and elbow joint spaces.    IV cannula with attached tubing overlies upper arm.    MINE NICE MD; Attending Radiologist  This document has been electronically signed. Aug 30 2022 10:39AM  ---------------------------------------------------------------------------------------------------------------  EXAM:  DUPLEX EXT VEINS UPPER LT                          PROCEDURE DATE:  09/01/2022      IMPRESSION:  No evidence of left upper extremity deep venous thrombosis.    FAWN FITZGERALD MD; Attending Radiologist  This document has been electronically signed. Sep  1 2022  1:10PM  ---------------------------------------------------------------------------------------------------------------  EXAM:  DUPLEX SCAN EXT VEINS LOWER BI                          PROCEDURE DATE:  08/31/2022      IMPRESSION:  No evidence of deep venous thrombosis in either lower extremity.    KEYLA ROMERO MD; Attending Radiologist  This document has been electronically signed. Aug 31 2022  7:59PM  ---------------------------------------------------------------------------

## 2022-09-12 NOTE — DIETITIAN INITIAL EVALUATION ADULT - NS FNS DIET ORDER
Diet, NPO with Tube Feed:   Tube Feeding Modality: Gastrostomy  Jevity 1.5 Naseem (JEVITY1.5RTH)  Total Volume for 24 Hours (mL): 1260  Continuous  Starting Tube Feed Rate {mL per Hour}: 10  Increase Tube Feed Rate by (mL): 10     Every 4 hours  Until Goal Tube Feed Rate (mL per Hour): 70  Tube Feed Duration (in Hours): 18  Tube Feed Start Time: 06:00 (09-09-22)

## 2022-09-12 NOTE — DIETITIAN INITIAL EVALUATION ADULT - NSFNSPHYEXAMSKINFT_GEN_A_CORE
bedscale wt per RD visit (9/12) 205.1 pounds   171% IBW ( pounds)  Skin: no noted pressure injuries as per flowsheets

## 2022-09-12 NOTE — CONSULT NOTE ADULT - ASSESSMENT
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 She also has a history of HTN, HLD, CAD s/p MI with preserved LVEF and moderate aortic stenosis. The patient was admitted to [place] in December 2021 with fever and abdominal pain due to perforated appendicitis. She was treated with tube drainage and antibiotics for a polymicrobial infection. Hospital course was complicated by respiratory failure due to mucous plugging with complete collapse of the left lung. She underwent several bronchoscopies for pulmonary toilet and was found to have severe tracheobronchomalacia. Her respiratory status has remained "stable" while at [facility] on nebulized bronchodilators and hypertonic saline and without nocturnal NIV. She was admitted in March with hematochezia. The left lower lobe was well aerated on a CT scan of the abdomen and pelvis with only bibasilar subsegmental atelectasis - there was scattered sigmoid diverticulosis without evidence of diverticulitis - interval decrease in size of a fluid collection in the region of the previously perforated appendicitis measuring 2.8 x 1.5 cm previously measuring 4.0 x 2.2 cm - slight increase in mild adjacent inflammatory change. The GI bleed was treated conservatively.  She was admitted on 8/29 with shortness of breath in the setting of back, neck and left lower extremity pain. She required NIV for mild acute hypercapnic respiratory failure that quickly resolved. Her respiratory status remained stable on room air and on her usual nebs and mucolytics. She was started on a puree diet with moderately thickened fluids in addition to PEG feeds following evaluation by the speech and swallow team. She was started on anastrazole for likely metastatic breast cancer to the bone. On the day of discharge, the patient was quite anxious with shortness of breath, chest congestion and wheeze - she was making a grunting noise with expiration. She was returned from [facility] that evening. Currently she is tachypneic and using accessory muscles of respiration. She has an audible wheeze and cough productive of scant sputum. No fevers, chills or sweats. No chest pain/pressure or palpitations.     the patient has new onset bronchospasm with increased work of breathing -  she has severe tracheobronchomalacia that has resulted in mucous plugging with complete left lung collapse requiring several bronchoscopies for pulmonary toilet - the patient was felt not to be a candidate for surgery for the tracheobronchomalacia and stenting was felt to have more risk than benefit     PLAN/RECOMMENDATIONS:    obtain a VBG  would start NIV after the VBG for increased work of breathing - continue NIV until the AM - NPO until that time  would obtain an admission CXR  would start solumedrol 20mg IVP q6h  albuterol/atrovent nebs q6h  hypertonic saline nebs q12h  guaifenesin 300mg 4 times daily via PEG    incentive spirometry wheh able  acapella device when able  CT scan 8/29 -> bilateral dependent atelectasis - resolution of left lower lobe complete collapse - multiple lytic bone lesions predominantly in the thoracic vertebral bodies which is concerning for metastatic disease   no indication for bronchoscopy at this time  CT scan 8/31 -> multiple lytic lesions are seen throughout the axial and appendicular skeleton, some of which appear increased in size from CT abdomen pelvis 3/16/2022 when evaluated in retrospect - a few lytic lesions involve the left intertrochanteric region and right acetabulum  oncology follow-up    it is likely that the patient has metastatic breast cancer    started on anastrozole  treatment of HTN, HLD, CAD, aortic stenosis per cardiology  DVT prophylaxis  analgesics    Thank you for the courtesy of this referral. Plan of care discussed with the patient at bedside and with the dedicated floor NP.    Kennedy Marcano MD, [facility]  [phone]  Pulmonary Medicine

## 2022-09-12 NOTE — CHART NOTE - NSCHARTNOTEFT_GEN_A_CORE
Advanced Care Planning:  I have had goals of care discussion, advanced directive and code status with patient/family    and  in  coordinating   patient care.     all questions answered and expressed understanding of the plan.   pt  is full code

## 2022-09-12 NOTE — PROGRESS NOTE ADULT - ASSESSMENT
89    year old female    h/o hemorrhagic CVA, has  PEG,  HTN, MI,   HLD, gout, right ca  breast   right Ca breast. s/p mastectomy in 2019,  s/p  sentinel node  localization.  4/4,  were  negative  peg dislodged.  IR   replacements  on 1/3/22  s/p rrt  . in past,  for hypoxia. cxr with complete  collapsed  of  left lung, needing broch,   s/ p  bronchoscopy , pt  with  tracheomalacia  and  collapsed  airways,  makes  this a  difficult  situation, as there is no good rx for this     h/o bacteremia. on   pna, perforated  appendicitis,  ?  mets  to  acetabulum, was  seen by dr pacheco   surg/ IR  and  oncology eval dr pacheco   sa w pt.  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 this will not alter rx. pt  will need   extended  course  of ab   on iv  vanco and ertapenem.  till  2/9/.22  ID dr reagan, and per  surg  and  IR  eval,  no intervention   CT  1/20/22, no PE. collection in right side   s/p  rectal bleed.        *  admitted with   presumed  resp  failure/ pt was  sent back from [facility], the same day   pt seen in er.  appears   at her naseline.  no  wheezing /  atousable    ENT eval w/ laryngoscopy notable for vocal cords mobile and intact, no edema, upper airway patent     hypertonic saline for airway clearance   cxr, no pna     *   s/p cva, has  PEG,  npo  ,   on  synthroid, Keppra  ,  *   HTN, on  cardizem,  per  card dr jamison  *  h/o  Ca breast. /, s/p  R  mastectomy  *   obesity, bmi  was   41, now  is  30   *     c/c left  leg contracture ,  remains  bed  bound. functional  paraplegia,  needs  assistance  for  all her  adl's   *  pt  with  h/o  tracheomalacia  and  collapsed  airways,      given pt;s  mlple co morbidities,  pt is  at risk for  re admissions     on nocturnal  bipap    difficult  situation, a s there  is no  good  rx  for  pt's  recurrent lung collapse hypoxia    h/o  of  chronic    mild  tachycardia    re  admission  likely,, given her  airways, and  propensity  for  lung collapse      *   CT chest. lytic  lesions, T  spine/  ribs/ humerus/  oncoloigy  d r ohri.  suspect  metastatic ca breast        son. Sy, updated/ oncologist  has  also  discussed  with  son,  futility of  pursuing  bx. a s pt  is  not an ideal   candidate  for  chemo    CT  A.p ,/ prelim ,  pelvic  mets  per  oncology,   suspect  metastatic ca  breast    was  awaiting   mri  spine/  pt unable  to tolerate  mri   per oncology, no  further  intervention/  and on  Arimidex, now,   per d r ohraghav mir,   difficult  situation, as  there  are no  good  options     no good  solution . given her mlple co morbidities   plan.   d/c to rehab,  when cleared   by pulm   obesity,  bed bound.  functional paraplegia    discussed  with team      per  son, pt is  full code                    89    year old female    h/o hemorrhagic CVA, has  PEG,  HTN, MI,   HLD, gout, right ca  breast   right Ca breast. s/p mastectomy in 2019,  s/p  sentinel node  localization.  4/4,  were  negative  peg dislodged.  IR   replacements  on 1/3/22  s/p rrt  . in past,  for hypoxia. cxr with complete  collapsed  of  left lung, needing broch,   s/ p  bronchoscopy , pt  with  tracheomalacia  and  collapsed  airways,  makes  this a  difficult  situation, as there is no good rx for this     h/o bacteremia. on   pna, perforated  appendicitis,  ?  mets  to  acetabulum, was  seen by dr pacheco   surg/ IR  and  oncology eval dr pacheco   sa w pt.  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 this will not alter rx. pt  will need   extended  course  of ab   on iv  vanco and ertapenem.  till  2/9/.22  ID dr reagan, and per  surg  and  IR  eval,  no intervention   CT  1/20/22, no PE. collection in right side   s/p  rectal bleed.        *  admitted with   presumed  resp  failure/ pt was  sent back from [facility], the same day   pt seen in er.  appears   at her naseline.  no  wheezing /  atousable    ENT eval w/ laryngoscopy notable for vocal cords mobile and intact, no edema, upper airway patent     hypertonic saline for airway clearance   cxr, no pna     *   s/p cva, has  PEG,  npo  ,   on  synthroid, Keppra  ,  *   HTN, on  cardizem,  per  card dr jamison  *  h/o  Ca breast. /, s/p  R  mastectomy  *   obesity, bmi  was   41, now  is  30   *     c/c left  leg contracture ,  remains  bed  bound. functional  paraplegia,  needs  assistance  for  all her  adl's   *  pt  with  h/o  tracheomalacia  and  collapsed  airways,      given pt;s  mlple co morbidities,  pt is  at risk for  re admissions     on nocturnal  bipap    difficult  situation, a s there  is no  good  rx  for  pt's  recurrent lung collapse hypoxia    h/o  of  chronic    mild  tachycardia    re  admission  likely,, given her  airways, and  propensity  for  lung collapse      *   CT chest. lytic  lesions, T  spine/  ribs/ humerus/  oncoloigy  d r ohri.  suspect  metastatic ca breast        son. Sy, updated/ oncologist  has  also  discussed  with  son,  futility of  pursuing  bx. a s pt  is  not an ideal   candidate  for  chemo    CT  A.p ,/ prelim ,  pelvic  mets  per  oncology,   suspect  metastatic ca  breast    was  awaiting   mri  spine/  pt unable  to tolerate  mri   per oncology, no  further  intervention/  and on  Arimidex, now,   per d r ohraghav mir,   difficult  situation, as  there  are no  good  options     no good  solution . given her mlple co morbidities   plan.   d/c to rehab,  when cleared   by pulm   obesity,  bed bound.  functional paraplegia    discussed  with team   per  son.  rehab saw pt  was a  bit anxious, and then promptly  returned   to er/ as they  did not  have  a  bipap machine /  care cortn to f.p     start   d/c planning/  needs  24/7  O2/ nocturnal bipap      per  son, pt is  full code

## 2022-09-12 NOTE — PROGRESS NOTE ADULT - ASSESSMENT
Pt is an 87yo F w/ PMH of severe tracheobronchomalacia, HTN, HLD, CAD, CVA w/ residual L sided weakness, Intrinsic lung disease, Aortic stenosis, R breast CA s/p mastectomy, L lung collapse and MRSE p/w dyspnea from rehab. on the same day as discharge   She has a previous hx of resp failure d/t mucus plugging w/ complete L lung collapse and severe tracheobronchomalacia requiring hospitalization at [facility] in March 2022. After discharge to rehab pt was maintained on BiPAP for about one month's time during the evening hours but was subsequently taken off. She was reportedly doing well until until started complaining of an inability to breath and having increased WOB prompting her ED visit 8/28/22 and dc on 9/8/22      She had right simple mastectomy in 4/2019 [id number] breast cancer, ER, NV positive and Her-2 negative.   She would ideally have received adjuvant hormonal therapy but did not.  CT (12.30.21 @ 14:12) >  BONES: Degenerative changes. T11 compression deformity, unchanged.   Redemonstration of an indeterminate lytic lesion in the right acetabulum.  Patient was in hospital at that time with perforated appendix with collection, managed conservatively.  Further evaluation was hampered by her comorbidities though thought was to get bone scan eventually as outpatient.  Now has lytic lesions in T spine  Paraproteinemia eval. unremarkable  Dx can only be confirmed with bone bx but most likely mets from breast.  She also needed MRI spine but could not tolerate it.   Her son Sy understood that further evaluation of spine was not possible and moreover if she needed radiation that can not be done either because of her inabilty to lie.   She was started on Arimdex 1 mg PO via PEG tube for most likely dx of metastatic breast cancer and should continue with it.  Her anemia is chronic with no evidence of B12 def or iron def

## 2022-09-12 NOTE — DIETITIAN INITIAL EVALUATION ADULT - OTHER INFO
Home Medications:  allopurinol 100 mg oral tablet: 1 tab(s) by gastrostomy tube once a day (08 Sep 2022 13:09)  anastrozole 1 mg oral tablet: 1 tab(s) by gastrostomy tube once a day (08 Sep 2022 13:09)  atorvastatin 20 mg oral tablet: 1 tab(s) by gastrostomy tube once a day (at bedtime) (08 Sep 2022 13:09)  baclofen 10 mg oral tablet: 1 tab(s) by gastrostomy tube 2 times a day (08 Sep 2022 13:09)  dilTIAZem 30 mg oral tablet: 1 tab(s) by gastrostomy tube 3 times a day (08 Sep 2022 13:09)  escitalopram 10 mg oral tablet: 1 tab(s) by gastrostomy tube once a day (08 Sep 2022 13:09)  ipratropium-albuterol 0.5 mg-2.5 mg/3 mL inhalation solution: 3 milliliter(s) inhaled every 6 hours (28 Aug 2022 04:56)  levETIRAcetam 100 mg/mL oral solution: 7.5 milliliter(s) by gastrostomy tube 2 times a day (08 Sep 2022 13:09)  levothyroxine 75 mcg (0.075 mg) oral tablet: 1 tab(s) by gastrostomy tube once a day (08 Sep 2022 13:09)  melatonin 3 mg oral tablet: 1 tab(s) by gastrostomy tube once a day (at bedtime) (08 Sep 2022 13:09)  sodium chloride 3% inhalation solution: 4 milliliter(s) inhaled every 12 hours (28 Aug 2022 04:56)  traMADol 50 mg oral tablet: 1 tab(s) by gastrostomy tube 2 times a day, As Needed (08 Sep 2022 13:09)  Vitamin B12 1000 mcg oral tablet: 1 tab(s) by gastrostomy tube once a day (08 Sep 2022 13:09)

## 2022-09-13 NOTE — PROGRESS NOTE ADULT - SUBJECTIVE AND OBJECTIVE BOX
afberile  REVIEW OF SYSTEMS:  GEN: no fever,    no chills  RESP: no SOB,   no cough  CVS: no chest pain,   no palpitations  GI: no abdominal pain,   no nausea,   no vomiting,   no constipation,   no diarrhea  : no dysuria,   no frequency  NEURO: no headache,   no dizziness  PSYCH: no depression,   not anxious  Derm : no rash    MEDICATIONS  (STANDING):  albuterol/ipratropium for Nebulization 3 milliLiter(s) Nebulizer every 6 hours  allopurinol 100 milliGRAM(s) Oral daily  anastrozole 1 milliGRAM(s) Oral daily  atorvastatin 20 milliGRAM(s) Oral at bedtime  chlorhexidine 2% Cloths 1 Application(s) Topical <User Schedule>  diltiazem    Tablet 30 milliGRAM(s) Oral three times a day  enoxaparin Injectable 40 milliGRAM(s) SubCutaneous every 24 hours  guaiFENesin Oral Liquid (Sugar-Free) 300 milliGRAM(s) Oral every 6 hours  levETIRAcetam  Solution 750 milliGRAM(s) Oral two times a day  levothyroxine 75 MICROGram(s) Oral daily  melatonin 3 milliGRAM(s) Oral at bedtime  methylPREDNISolone sodium succinate Injectable 20 milliGRAM(s) IV Push every 6 hours  senna 2 Tablet(s) Oral at bedtime  sodium chloride 3%  Inhalation 4 milliLiter(s) Inhalation every 12 hours    MEDICATIONS  (PRN):  acetaminophen     Tablet .. 650 milliGRAM(s) Oral every 6 hours PRN Temp greater or equal to 38C (100.4F), Mild Pain (1 - 3)  baclofen 5 milliGRAM(s) Oral every 12 hours PRN Muscle Spasm  bisacodyl Suppository 10 milliGRAM(s) Rectal daily PRN Constipation  polyethylene glycol 3350 17 Gram(s) Oral daily PRN Constipation  traMADol 50 milliGRAM(s) Oral two times a day PRN Severe Pain (7 - 10)      Vital Signs Last 24 Hrs  T(C): 36.6 (13 Sep 2022 04:50), Max: 37 (12 Sep 2022 12:12)  T(F): 97.8 (13 Sep 2022 04:50), Max: 98.6 (12 Sep 2022 12:12)  HR: 77 (13 Sep 2022 04:50) (77 - 94)  BP: 115/77 (13 Sep 2022 04:50) (105/73 - 165/80)  BP(mean): --  RR: 18 (13 Sep 2022 04:50) (18 - 18)  SpO2: 98% (13 Sep 2022 04:50) (93% - 98%)    Parameters below as of 13 Sep 2022 04:50  Patient On (Oxygen Delivery Method): BiPAP/CPAP      CAPILLARY BLOOD GLUCOSE      POCT Blood Glucose.: 170 mg/dL (13 Sep 2022 06:15)  POCT Blood Glucose.: 187 mg/dL (13 Sep 2022 00:29)  POCT Blood Glucose.: 193 mg/dL (12 Sep 2022 17:25)    I&O's Summary    12 Sep 2022 07:01  -  13 Sep 2022 07:00  --------------------------------------------------------  IN: 0 mL / OUT: 400 mL / NET: -400 mL        PHYSICAL EXAM:  HEAD:  Atraumatic, Normocephalic  NECK: Supple, No   JVD  CHEST/LUNG:   no     rales,     no,    rhonchi  HEART: Regular rate and rhythm;         murmur  ABDOMEN: Soft, Nontender, ;   EXTREMITIES:    no    edema  NEUROLOGY:  alert    LABS:                        8.8    x     )-----------( x        ( 11 Sep 2022 18:09 )             29.2     09-11    136  |  100  |  24<H>  ----------------------------<  179<H>  4.1   |  25  |  0.60    Ca    9.0      11 Sep 2022 13:52                  09-12 @ 14:34  4.8  51              Consultant(s) Notes Reviewed:      Care Discussed with Consultants/Other Providers:

## 2022-09-13 NOTE — PROGRESS NOTE ADULT - SUBJECTIVE AND OBJECTIVE BOX
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; PEG in place    INTERVAL HISTORY: awake and alert but remains on AVAPS; no VBG done this AM as ordered; no longer grunting with respiration; occasional cough productive of scant sputum; less chest congestion and wheeze; no fevers, chills or sweats; no chest pain/pressure or palpitations; remains off PEG feeds;     GI: dysphagia  : Within normal limits  Musculoskeletal: Within normal limits  Skin: Within normal limits  Neurological: CVA - left sided plegia    MEDICATIONS:     Pulmonary "  albuterol/ipratropium for Nebulization 3 milliLiter(s) Nebulizer every 6 hours  guaiFENesin Oral Liquid (Sugar-Free) 300 milliGRAM(s) Oral every 6 hours  sodium chloride 3%  Inhalation 4 milliLiter(s) Inhalation every 12 hours    Anti-microbials:    Cardiovascular:  diltiazem    Tablet 30 milliGRAM(s) Oral three times a day    Other:  allopurinol 100 milliGRAM(s) Oral daily  anastrozole 1 milliGRAM(s) Oral daily  atorvastatin 20 milliGRAM(s) Oral at bedtime  chlorhexidine 2% Cloths 1 Application(s) Topical <User Schedule>  enoxaparin Injectable 40 milliGRAM(s) SubCutaneous every 24 hours  levETIRAcetam  Solution 750 milliGRAM(s) Oral two times a day  levothyroxine 75 MICROGram(s) Oral daily  melatonin 3 milliGRAM(s) Oral at bedtime  methylPREDNISolone sodium succinate Injectable 20 milliGRAM(s) IV Push every 6 hours  senna 2 Tablet(s) Oral at bedtime    MEDICATIONS  (PRN):  acetaminophen     Tablet .. 650 milliGRAM(s) Oral every 6 hours PRN Temp greater or equal to 38C (100.4F), Mild Pain (1 - 3)  baclofen 5 milliGRAM(s) Oral every 12 hours PRN Muscle Spasm  bisacodyl Suppository 10 milliGRAM(s) Rectal daily PRN Constipation  polyethylene glycol 3350 17 Gram(s) Oral daily PRN Constipation  traMADol 50 milliGRAM(s) Oral two times a day PRN Severe Pain (7 - 10)      OBJECTIVE:    POCT Blood Glucose.: 180 mg/dL (13 Sep 2022 12:00)  POCT Blood Glucose.: 170 mg/dL (13 Sep 2022 06:15)  POCT Blood Glucose.: 187 mg/dL (13 Sep 2022 00:29)  POCT Blood Glucose.: 193 mg/dL (12 Sep 2022 17:25)      PHYSICAL EXAM:       ICU Vital Signs Last 24 Hrs  T(C): 36.6 (13 Sep 2022 11:15), Max: 36.9 (12 Sep 2022 20:51)  T(F): 97.8 (13 Sep 2022 11:15), Max: 98.5 (12 Sep 2022 20:51)  HR: 64 (13 Sep 2022 11:15) (64 - 85)  BP: 141/76 (13 Sep 2022 11:15) (115/77 - 165/80)  BP(mean): --  ABP: --  ABP(mean): --  RR: 18 (13 Sep 2022 11:15) (18 - 18)  SpO2: 98% (13 Sep 2022 11:15) (93% - 100%) on AVAPS    General: Awake. Alert. Cooperative. Appears stated age. Obese. Bedbound.   HEENT: Atraumatic. Normocephalic. Anicteric. Normal oral mucosa. PERRL. EOMI. Mallampati class IV airway. Full face mask  Neck: Supple. Trachea midline. Thyroid without enlargement/tenderness/nodules. No carotid bruit. No JVD. Short and wide.  Cardiovascular: Regular rate and rhythm. S1 S2 normal. III/VI systolic murmur  Respiratory: No respiratory distress. Mild wheeze anteriorly. Kyphosis. Poor chest wall excursion  Abdomen: Soft. Non-tender. Non-distended. No organomegaly. No masses. Normal bowel sounds. Obese. PEG.  Extremities: Warm to touch. No clubbing or cyanosis. No pedal edema. Large legs. Left leg contracted under the right leg  Pulses: 2+ peripheral pulses all extremities.	  Skin: Normal skin color. No rashes or lesions. No ecchymoses. No cyanosis. Warm to touch.  Lymph Nodes: Cervical, supraclavicular and axillary nodes normal  Neurological: Left lower extremity plegia with contraction. Left upper extremity is quite weak. A and O x 3    LABS:                          8.8    x     )-----------( x        ( 11 Sep 2022 18:09 )             29.2     CBC    WBC  7.95 <==, 7.60 <==, 11.07 <==    Hemoglobin  8.8 <<==, 7.2 <<==, 8.2 <<==, 8.3 <<==    Hematocrit  29.2 <==, 24.2 <==, 26.7 <==, 27.3 <==    Platelets  252 <==, 223 <==, 245 <==      136  |  100  |  24<H>  ----------------------------<  179<H>    09-11  4.1   |  25  |  0.60      LYTES    sodium  136 <==, 136 <==, 135 <==    potassium   4.1 <==, 5.0 <==, 4.9 <==    chloride  100 <==, 99 <==, 96 <==    carbon dioxide  25 <==, 22 <==, 27 <==    =============================================================================================  RENAL FUNCTION:    Creatinine:   0.60  <<==, 0.63  <<==, 0.68  <<==    BUN:   24 <==, 25 <==, 27 <==    ============================================================================================    calcium   9.0 <==, 9.3 <==, 9.8 <==    phos   3.7 <==    mag   2.2 <==, 2.1 <==    ============================================================================================  LFTs    AST:   14 <== , 18 <==     ALT:  13  <== , 13  <==     AP:  156  <=, 176  <=    Bili:  0.4  <=, 0.4  <=    PT/INR - ( 08 Sep 2022 20:56 )   PT: 13.0 sec;   INR: 1.13 ratio      Venous Blood Gas:  09-12 @ 14:34  7.35/56/51/31/82.5  VBG Lactate: 1.4    Procalcitonin, Serum: 0.40 ng/mL (09-09 @ 05:58)    Serum Pro-Brain Natriuretic Peptide: 461 pg/mL (09-08 @ 20:56)    Blood Gas Profile - Venous (09.08.22 @ 20:31)   pH, Venous: 7.37   pCO2, Venous: 53 mmHg   pO2, Venous: 34 mmHg   HCO3, Venous: 31 mmol/L   Base Excess, Venous: 4.6 mmol/L   Oxygen Saturation, Venous: 58.4 %   Total CO2, Venous: 32 mmol/L   Blood Gas Source Venous: Venous     Blood Gas Profile - Venous (09.02.22 @ 09:03)   pH, Venous: 7.31  pCO2, Venous: 51 mmHg   pO2, Venous: 45 mmHg   HCO3, Venous: 26 mmol/L   Base Excess, Venous: -1.2 mmol/L   Oxygen Saturation, Venous: 73.4 %   Total CO2, Venous: 27 mmol/L   Blood Gas Source Venous: Venous     CARDIAC MARKERS ( 08 Sep 2022 21:37 )  CPK x     /CKMB x     /CKMB Units x        troponin 32 ng/L    CARDIAC MARKERS ( 08 Sep 2022 20:56 )  CPK x     /CKMB x     /CKMB Units x        troponin 32 ng/L    < from: TTE with Doppler (w/Cont) (01.21.22 @ 14:08) >    Patient name: FRANKI MEHTA  YOB: 1935   Age: 86 (F)   MR#: [id number]  Study Date: 1/21/2022  ------------------------------------------------------------------------  Dimensions:    Normal Values:  LA:     2.5    2.0 - 4.0 cm  Ao:     3.3    2.0 - 3.8 cm  SEPTUM: 1.6    0.6 - 1.2 cm  PWT:    0.9    0.6 - 1.1 cm  LVIDd:  3.6    3.0 - 5.6 cm  LVIDs:  2.5    1.8 - 4.0 cm  Derived variables:  LVMI: 77 g/m2  RWT: 0.50  Fractional short: 31 %  EF (Visual Estimate): 70-75 %  ------------------------------------------------------------------------  Conclusions:  1. Concentric left ventricular hypertrophy.  2. Hyperdynamic left ventricular systolic function.  Endocardial visualization enhanced with intravenous  injection of Ultrasonic Enhancing Agent (Definity).  3. The right ventricle is not well visualized; grossly  normal right ventricular systolic function.  Pt refused to proceed with exam.  Limited Doppler study.  No trans aortic gradients.  Unable to rule out endocarditis.  ------------------------------------------------------------------------  Confirmed on 1/21/2022 - 15:42:57 by COMPA Castillo  ------------------------------------------------------------------------  ---------------------------------------------------------------------------------------------------------------      MICROBIOLOGY:     Respiratory Viral Panel with COVID-19 by RYAN (09.08.22 @ 21:38)   Rapid RVP Result: [facility]   SARS-CoV-2: [facility]  This Respiratory Panel uses polymerase chain reaction (PCR) to detect for   adenovirus; coronavirus (HKU1, NL63, 229E, OC43); human metapneumovirus   (hMPV); human enterovirus/rhinovirus (Entero/RV); influenza A; influenza   A/H1; influenza A/H3; influenza A/H1-2009; influenza B; parainfluenza   viruses 1, 2, 3, 4; respiratory syncytial virus; Mycoplasma pneumoniae;   Chlamydophila pneumoniae; and SARS-CoV-2.     Culture - Blood (09.08.22 @ 20:39)   Specimen Source: .Blood Blood-Peripheral   Culture Results:   No growth to date.     Culture - Blood (09.08.22 @ 20:25)   Specimen Source: .Blood Blood-Peripheral   Culture Results:   No growth to date.     RADIOLOGY:  [x ] Chest radiographs reviewed and interpreted by me    EXAM:  XR CHEST PORTABLE URGENT 1V                          PROCEDURE DATE:  09/12/2022      FINDINGS:    Gastrostomy tube.  The heart is enlarged.  Patchy left mid and lower lung opacity is minimally increased. The right   lung is clear.  No pleural effusion or pneumothorax.    IMPRESSION:  Minimally increased left mid to lower lung atelectasis..    ROBSON PIZANO MD; Resident Radiology  This document has been electronically signed.  DIANE CLARK MD; Attending Radiologist  This document has been electronically signed. Sep 12 2022  2:46PM    --------------------------------------------------------------------------------------------------------------  EXAM:  DUPLEX SCAN EXT VEINS LOWER BI                          PROCEDURE DATE:  09/10/2022      IMPRESSION:  Limited study  No evidence of deep venous thrombosis in either lower extremity.  Limited visualization of the calf veins.    KEYLA ROMERO MD; Attending Radiologist  This document has been electronically signed. Sep 10 2022 10:42AM  ---------------------------------------------------------------------------------------------------------------  EXAM:  CT CHEST                          PROCEDURE DATE:  08/29/2022      FINDINGS:    LYMPH NODES: No mediastinal lymphadenopathy.    HEART/VASCULATURE: The heart is normal in size. No pericardial effusion.   Aortic valve calcifications noted.    AIRWAYS/LUNGS/PLEURA: Patent central airways. Right lower lobe dependent   atelectasis appear similar to prior. Patchy groundglass opacities the   right apex are improved. Interval resolution of left lower lobe complete   collapse. There is mild dependent atelectasis of the left upper and lower   lobes.    UPPER ABDOMEN: Gallbladder stones.    BONES/SOFT TISSUES: Status post right mastectomy. Status post left   humerus ORIF. Marked kyphosis. Multiple lytic bone lesions predominantly   in the posterior and lateral thoracic vertebral bodies. It is unclear if   there is any spinal canal extension. Lytic lesions are alsoseen at the   right posterior seventh rib and right humeral head    OTHER: Right maxillary sinus is opacified.      IMPRESSION:  Bilateral dependent atelectasis. Interval resolution of left lower lobe   complete collapse.    Multiple lytic bone lesions, predominantly in the thoracic vertebral   bodies which is concerning for metastatic disease. Unclear if there is   any spinal canal extension. Recommend MRI of the thoracic spine for   further evaluation.    BAILEY STARR MD; Resident Radiologist  This document has been electronically signed.  HAY IRVIN MD; Attending Radiologist  This document has been electronically signed. Aug 29 2022 12:01PM  ---------------------------------------------------------------------------------------------------------------  EXAM:  CT ABDOMEN AND PELVIS IC                          PROCEDURE DATE:  08/31/2022      IMPRESSION:    Multiple lytic lesions are seen throughout the axial and appendicular   skeleton, some of which appear increased in size from CT abdomen pelvis   3/16/2022 when evaluated in retrospect. A few lytic lesions involve the   left intertrochanteric region and right acetabulum.    Endometrial thickening. Recommend dedicated pelvic sonogram.    Cystic lesions are seen within the pancreas. Recommend a dedicated MRI on   a nonemergent basis.    SULY KENDRICK MD; Resident Radiology  This document has been electronically signed.  BRITTANI MALCOLM MD; Attending Radiologist  This document has been electronically signed. Sep  1 2022 11:01AM  ---------------------------------------------------------------------------------------------------------------  EXAM:  XR HUMERUS MIN 2 VIEWS RT                          PROCEDURE DATE:  08/30/2022      EXAM:  Internal/external rotation AP right humerus from 8/30/2022 at 0927.   Compared to appearance on previous day chest CT.    IMPRESSION:  Generalized osteopenia. Redemonstrated focal lytic lesion in proximal   medial humeral diaphysis with small area of permeative cortical   destruction. No additional radiographically appreciated suspicious lytic   or blastic lesions in remaining imaged regions.    No current fractures or dislocations.    Preserved shoulder and elbow joint spaces.    IV cannula with attached tubing overlies upper arm.    MINE NICE MD; Attending Radiologist  This document has been electronically signed. Aug 30 2022 10:39AM  ---------------------------------------------------------------------------------------------------------------  EXAM:  DUPLEX EXT VEINS UPPER LT                          PROCEDURE DATE:  09/01/2022      IMPRESSION:  No evidence of left upper extremity deep venous thrombosis.    FAWN FITZGERALD MD; Attending Radiologist  This document has been electronically signed. Sep  1 2022  1:10PM  ---------------------------------------------------------------------------------------------------------------  EXAM:  DUPLEX SCAN EXT VEINS LOWER BI                          PROCEDURE DATE:  08/31/2022      IMPRESSION:  No evidence of deep venous thrombosis in either lower extremity.    KEYLA ROMERO MD; Attending Radiologist  This document has been electronically signed. 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; PEG in place    INTERVAL HISTORY: awake and alert but remains on AVAPS; no VBG done this AM as ordered; no longer grunting with respiration; occasional cough productive of scant sputum; less chest congestion and wheeze; no fevers, chills or sweats; no chest pain/pressure or palpitations; remains off PEG feeds;     REVIEW OF SYSTEMS:  Constitutional: As per HPI  HEENT: Within normal limits  CV: As per HPI  Resp: As per HPI  GI: dysphagia  : Within normal limits  Musculoskeletal: Within normal limits  Skin: Within normal limits  Neurological: CVA - left sided plegia  Psychiatric: Within normal limits  Endocrine: Within normal limits  Hematologic/Lymphatic: Within normal limits  Allergic/Immunologic: Within normal limits    [x] All other systems negative      MEDICATIONS:     Pulmonary "  albuterol/ipratropium for Nebulization 3 milliLiter(s) Nebulizer every 6 hours  guaiFENesin Oral Liquid (Sugar-Free) 300 milliGRAM(s) Oral every 6 hours  sodium chloride 3%  Inhalation 4 milliLiter(s) Inhalation every 12 hours    Anti-microbials:    Cardiovascular:  diltiazem    Tablet 30 milliGRAM(s) Oral three times a day    Other:  allopurinol 100 milliGRAM(s) Oral daily  anastrozole 1 milliGRAM(s) Oral daily  atorvastatin 20 milliGRAM(s) Oral at bedtime  chlorhexidine 2% Cloths 1 Application(s) Topical <User Schedule>  enoxaparin Injectable 40 milliGRAM(s) SubCutaneous every 24 hours  levETIRAcetam  Solution 750 milliGRAM(s) Oral two times a day  levothyroxine 75 MICROGram(s) Oral daily  melatonin 3 milliGRAM(s) Oral at bedtime  methylPREDNISolone sodium succinate Injectable 20 milliGRAM(s) IV Push every 6 hours  senna 2 Tablet(s) Oral at bedtime    MEDICATIONS  (PRN):  acetaminophen     Tablet .. 650 milliGRAM(s) Oral every 6 hours PRN Temp greater or equal to 38C (100.4F), Mild Pain (1 - 3)  baclofen 5 milliGRAM(s) Oral every 12 hours PRN Muscle Spasm  bisacodyl Suppository 10 milliGRAM(s) Rectal daily PRN Constipation  polyethylene glycol 3350 17 Gram(s) Oral daily PRN Constipation  traMADol 50 milliGRAM(s) Oral two times a day PRN Severe Pain (7 - 10)      OBJECTIVE:    POCT Blood Glucose.: 180 mg/dL (13 Sep 2022 12:00)  POCT Blood Glucose.: 170 mg/dL (13 Sep 2022 06:15)  POCT Blood Glucose.: 187 mg/dL (13 Sep 2022 00:29)  POCT Blood Glucose.: 193 mg/dL (12 Sep 2022 17:25)      PHYSICAL EXAM:       ICU Vital Signs Last 24 Hrs  T(C): 36.6 (13 Sep 2022 11:15), Max: 36.9 (12 Sep 2022 20:51)  T(F): 97.8 (13 Sep 2022 11:15), Max: 98.5 (12 Sep 2022 20:51)  HR: 64 (13 Sep 2022 11:15) (64 - 85)  BP: 141/76 (13 Sep 2022 11:15) (115/77 - 165/80)  BP(mean): --  ABP: --  ABP(mean): --  RR: 18 (13 Sep 2022 11:15) (18 - 18)  SpO2: 98% (13 Sep 2022 11:15) (93% - 100%) on AVAPS    General: Awake. Alert. Cooperative. Appears stated age. Obese. Bedbound.   HEENT: Atraumatic. Normocephalic. Anicteric. Normal oral mucosa. PERRL. EOMI. Mallampati class IV airway. Full face mask  Neck: Supple. Trachea midline. Thyroid without enlargement/tenderness/nodules. No carotid bruit. No JVD. Short and wide.  Cardiovascular: Regular rate and rhythm. S1 S2 normal. III/VI systolic murmur  Respiratory: No respiratory distress. Mild wheeze anteriorly. Kyphosis. Poor chest wall excursion  Abdomen: Soft. Non-tender. Non-distended. No organomegaly. No masses. Normal bowel sounds. Obese. PEG.  Extremities: Warm to touch. No clubbing or cyanosis. No pedal edema. Large legs. Left leg contracted under the right leg  Pulses: 2+ peripheral pulses all extremities.	  Skin: Normal skin color. No rashes or lesions. No ecchymoses. No cyanosis. Warm to touch.  Lymph Nodes: Cervical, supraclavicular and axillary nodes normal  Neurological: Left lower extremity plegia with contraction. Left upper extremity is quite weak. A and O x 3    LABS:                          8.8    x     )-----------( x        ( 11 Sep 2022 18:09 )             29.2     CBC    WBC  7.95 <==, 7.60 <==, 11.07 <==    Hemoglobin  8.8 <<==, 7.2 <<==, 8.2 <<==, 8.3 <<==    Hematocrit  29.2 <==, 24.2 <==, 26.7 <==, 27.3 <==    Platelets  252 <==, 223 <==, 245 <==      136  |  100  |  24<H>  ----------------------------<  179<H>    09-11  4.1   |  25  |  0.60      LYTES    sodium  136 <==, 136 <==, 135 <==    potassium   4.1 <==, 5.0 <==, 4.9 <==    chloride  100 <==, 99 <==, 96 <==    carbon dioxide  25 <==, 22 <==, 27 <==    =============================================================================================  RENAL FUNCTION:    Creatinine:   0.60  <<==, 0.63  <<==, 0.68  <<==    BUN:   24 <==, 25 <==, 27 <==    ============================================================================================    calcium   9.0 <==, 9.3 <==, 9.8 <==    phos   3.7 <==    mag   2.2 <==, 2.1 <==    ============================================================================================  LFTs    AST:   14 <== , 18 <==     ALT:  13  <== , 13  <==     AP:  156  <=, 176  <=    Bili:  0.4  <=, 0.4  <=    PT/INR - ( 08 Sep 2022 20:56 )   PT: 13.0 sec;   INR: 1.13 ratio      Venous Blood Gas:  09-12 @ 14:34  7.35/56/51/31/82.5  VBG Lactate: 1.4    Procalcitonin, Serum: 0.40 ng/mL (09-09 @ 05:58)    Serum Pro-Brain Natriuretic Peptide: 461 pg/mL (09-08 @ 20:56)    Blood Gas Profile - Venous (09.08.22 @ 20:31)   pH, Venous: 7.37   pCO2, Venous: 53 mmHg   pO2, Venous: 34 mmHg   HCO3, Venous: 31 mmol/L   Base Excess, Venous: 4.6 mmol/L   Oxygen Saturation, Venous: 58.4 %   Total CO2, Venous: 32 mmol/L   Blood Gas Source Venous: Venous     Blood Gas Profile - Venous (09.02.22 @ 09:03)   pH, Venous: 7.31  pCO2, Venous: 51 mmHg   pO2, Venous: 45 mmHg   HCO3, Venous: 26 mmol/L   Base Excess, Venous: -1.2 mmol/L   Oxygen Saturation, Venous: 73.4 %   Total CO2, Venous: 27 mmol/L   Blood Gas Source Venous: Venous     CARDIAC MARKERS ( 08 Sep 2022 21:37 )  CPK x     /CKMB x     /CKMB Units x        troponin 32 ng/L    CARDIAC MARKERS ( 08 Sep 2022 20:56 )  CPK x     /CKMB x     /CKMB Units x        troponin 32 ng/L    < from: TTE with Doppler (w/Cont) (01.21.22 @ 14:08) >    Patient name: FRANKI MEHTA  YOB: 1935   Age: 86 (F)   MR#: [id number]  Study Date: 1/21/2022  ------------------------------------------------------------------------  Dimensions:    Normal Values:  LA:     2.5    2.0 - 4.0 cm  Ao:     3.3    2.0 - 3.8 cm  SEPTUM: 1.6    0.6 - 1.2 cm  PWT:    0.9    0.6 - 1.1 cm  LVIDd:  3.6    3.0 - 5.6 cm  LVIDs:  2.5    1.8 - 4.0 cm  Derived variables:  LVMI: 77 g/m2  RWT: 0.50  Fractional short: 31 %  EF (Visual Estimate): 70-75 %  ------------------------------------------------------------------------  Conclusions:  1. Concentric left ventricular hypertrophy.  2. Hyperdynamic left ventricular systolic function.  Endocardial visualization enhanced with intravenous  injection of Ultrasonic Enhancing Agent (Definity).  3. The right ventricle is not well visualized; grossly  normal right ventricular systolic function.  Pt refused to proceed with exam.  Limited Doppler study.  No trans aortic gradients.  Unable to rule out endocarditis.  ------------------------------------------------------------------------  Confirmed on 1/21/2022 - 15:42:57 by Kole Mcfarland M.D.  [facility]  ------------------------------------------------------------------------  ---------------------------------------------------------------------------------------------------------------      MICROBIOLOGY:     Respiratory Viral Panel with COVID-19 by RYAN (09.08.22 @ 21:38)   Rapid RVP Result: [facility]   SARS-CoV-2: [facility]  This Respiratory Panel uses polymerase chain reaction (PCR) to detect for   adenovirus; coronavirus (HKU1, NL63, 229E, OC43); human metapneumovirus   (hMPV); human enterovirus/rhinovirus (Entero/RV); influenza A; influenza   A/H1; influenza A/H3; influenza A/H1-2009; influenza B; parainfluenza   viruses 1, 2, 3, 4; respiratory syncytial virus; Mycoplasma pneumoniae;   Chlamydophila pneumoniae; and SARS-CoV-2.     Culture - Blood (09.08.22 @ 20:39)   Specimen Source: .Blood Blood-Peripheral   Culture Results:   No growth to date.     Culture - Blood (09.08.22 @ 20:25)   Specimen Source: .Blood Blood-Peripheral   Culture Results:   No growth to date.     RADIOLOGY:  [x ] Chest radiographs reviewed and interpreted by me    EXAM:  XR CHEST PORTABLE URGENT 1V                          PROCEDURE DATE:  09/12/2022      FINDINGS:    Gastrostomy tube.  The heart is enlarged.  Patchy left mid and lower lung opacity is minimally increased. The right   lung is clear.  No pleural effusion or pneumothorax.    IMPRESSION:  Minimally increased left mid to lower lung atelectasis..    ROBSON PIZANO MD; Resident Radiology  This document has been electronically signed.  DIANE CLAKR MD; Attending Radiologist  This document has been electronically signed. Sep 12 2022  2:46PM    --------------------------------------------------------------------------------------------------------------  EXAM:  DUPLEX SCAN EXT VEINS LOWER BI                          PROCEDURE DATE:  09/10/2022      IMPRESSION:  Limited study  No evidence of deep venous thrombosis in either lower extremity.  Limited visualization of the calf veins.    KEYLA ROMERO MD; Attending Radiologist  This document has been electronically signed. Sep 10 2022 10:42AM  ---------------------------------------------------------------------------------------------------------------  EXAM:  CT CHEST                          PROCEDURE DATE:  08/29/2022      FINDINGS:    LYMPH NODES: No mediastinal lymphadenopathy.    HEART/VASCULATURE: The heart is normal in size. No pericardial effusion.   Aortic valve calcifications noted.    AIRWAYS/LUNGS/PLEURA: Patent central airways. Right lower lobe dependent   atelectasis appear similar to prior. Patchy groundglass opacities the   right apex are improved. Interval resolution of left lower lobe complete   collapse. There is mild dependent atelectasis of the left upper and lower   lobes.    UPPER ABDOMEN: Gallbladder stones.    BONES/SOFT TISSUES: Status post right mastectomy. Status post left   humerus ORIF. Marked kyphosis. Multiple lytic bone lesions predominantly   in the posterior and lateral thoracic vertebral bodies. It is unclear if   there is any spinal canal extension. Lytic lesions are alsoseen at the   right posterior seventh rib and right humeral head    OTHER: Right maxillary sinus is opacified.      IMPRESSION:  Bilateral dependent atelectasis. Interval resolution of left lower lobe   complete collapse.    Multiple lytic bone lesions, predominantly in the thoracic vertebral   bodies which is concerning for metastatic disease. Unclear if there is   any spinal canal extension. Recommend MRI of the thoracic spine for   further evaluation.    BAILEY STARR MD; Resident Radiologist  This document has been electronically signed.  HAY IRVIN MD; Attending Radiologist  This document has been electronically signed. Aug 29 2022 12:01PM  ---------------------------------------------------------------------------------------------------------------  EXAM:  CT ABDOMEN AND PELVIS IC                          PROCEDURE DATE:  08/31/2022      IMPRESSION:    Multiple lytic lesions are seen throughout the axial and appendicular   skeleton, some of which appear increased in size from CT abdomen pelvis   3/16/2022 when evaluated in retrospect. A few lytic lesions involve the   left intertrochanteric region and right acetabulum.    Endometrial thickening. Recommend dedicated pelvic sonogram.    Cystic lesions are seen within the pancreas. Recommend a dedicated MRI on   a nonemergent basis.    USLY KENDRICK MD; Resident Radiology  This document has been electronically signed.  BRITTANI MALCOLM MD; Attending Radiologist  This document has been electronically signed. Sep  1 2022 11:01AM  ---------------------------------------------------------------------------------------------------------------  EXAM:  XR HUMERUS MIN 2 VIEWS RT                          PROCEDURE DATE:  08/30/2022      EXAM:  Internal/external rotation AP right humerus from 8/30/2022 at 0927.   Compared to appearance on previous day chest CT.    IMPRESSION:  Generalized osteopenia. Redemonstrated focal lytic lesion in proximal   medial humeral diaphysis with small area of permeative cortical   destruction. No additional radiographically appreciated suspicious lytic   or blastic lesions in remaining imaged regions.    No current fractures or dislocations.    Preserved shoulder and elbow joint spaces.    IV cannula with attached tubing overlies upper arm.    MINE NICE MD; Attending Radiologist  This document has been electronically signed. Aug 30 2022 10:39AM  ---------------------------------------------------------------------------------------------------------------  EXAM:  DUPLEX EXT VEINS UPPER LT                          PROCEDURE DATE:  09/01/2022      IMPRESSION:  No evidence of left upper extremity deep venous thrombosis.    FAWN FITZGERALD MD; Attending Radiologist  This document has been electronically signed. Sep  1 2022  1:10PM  ---------------------------------------------------------------------------------------------------------------  EXAM:  DUPLEX SCAN EXT VEINS LOWER BI                          PROCEDURE DATE:  08/31/2022      IMPRESSION:  No evidence of deep venous thrombosis in either lower extremity.    KEYLA ROMERO MD; Attending Radiologist  This document has been electronically signed. Aug 31 2022  7:59PM  ---------------------------------------------------------------------------

## 2022-09-13 NOTE — PROGRESS NOTE ADULT - SUBJECTIVE AND OBJECTIVE BOX
CARDIOLOGY     PROGRESS  NOTE   ________________________________________________    CHIEF COMPLAINT:Patient is a 87y old  Female who presents with a chief complaint of SOB (12 Sep 2022 11:40)  comfortable.  	  REVIEW OF SYSTEMS:  CONSTITUTIONAL: No fever, weight loss, or fatigue  EYES: No eye pain, visual disturbances, or discharge  ENT:  No difficulty hearing, tinnitus, vertigo; No sinus or throat pain  NECK: No pain or stiffness  RESPIRATORY: No cough, wheezing, chills or hemoptysis; decrease  Shortness of Breath  CARDIOVASCULAR: No chest pain, palpitations, passing out, dizziness, or leg swelling  GASTROINTESTINAL: No abdominal or epigastric pain. No nausea, vomiting, or hematemesis; No diarrhea or constipation. No melena or hematochezia.  GENITOURINARY: No dysuria, frequency, hematuria, or incontinence  NEUROLOGICAL: No headaches, memory loss, loss of strength, numbness, or tremors  SKIN: No itching, burning, rashes, or lesions   LYMPH Nodes: No enlarged glands  ENDOCRINE: No heat or cold intolerance; No hair loss  MUSCULOSKELETAL: No joint pain or swelling; No muscle, back, or extremity pain  PSYCHIATRIC: No depression, anxiety, mood swings, or difficulty sleeping  HEME/LYMPH: No easy bruising, or bleeding gums  ALLERGY AND IMMUNOLOGIC: No hives or eczema	    [ ] All others negative	  [ ] Unable to obtain    PHYSICAL EXAM:  T(C): 36.6 (09-13-22 @ 04:50), Max: 37 (09-12-22 @ 12:12)  HR: 77 (09-13-22 @ 04:50) (77 - 94)  BP: 115/77 (09-13-22 @ 04:50) (105/73 - 165/80)  RR: 18 (09-13-22 @ 04:50) (18 - 18)  SpO2: 98% (09-13-22 @ 04:50) (93% - 98%)  Wt(kg): --  I&O's Summary    12 Sep 2022 07:01  -  13 Sep 2022 07:00  --------------------------------------------------------  IN: 0 mL / OUT: 400 mL / NET: -400 mL        Appearance: Normal	  HEENT:   Normal oral mucosa, PERRL, EOMI	  Lymphatic: No lymphadenopathy  Cardiovascular: Normal S1 S2, No JVD, No murmurs, + edema  Respiratory: Lungs clear to auscultation	  Psychiatry: A & O x 3, Mood & affect appropriate  Gastrointestinal:  Soft, Non-tender, + BS	  Skin: No rashes, No ecchymoses, No cyanosis	  Neurologic: Non-focal  Extremities: Normal range of motion, No clubbing, cyanosis , +edema  Vascular: Peripheral pulses palpable 2+ bilaterally    MEDICATIONS  (STANDING):  albuterol/ipratropium for Nebulization 3 milliLiter(s) Nebulizer every 6 hours  allopurinol 100 milliGRAM(s) Oral daily  anastrozole 1 milliGRAM(s) Oral daily  atorvastatin 20 milliGRAM(s) Oral at bedtime  chlorhexidine 2% Cloths 1 Application(s) Topical <User Schedule>  diltiazem    Tablet 30 milliGRAM(s) Oral three times a day  enoxaparin Injectable 40 milliGRAM(s) SubCutaneous every 24 hours  guaiFENesin Oral Liquid (Sugar-Free) 300 milliGRAM(s) Oral every 6 hours  levETIRAcetam  Solution 750 milliGRAM(s) Oral two times a day  levothyroxine 75 MICROGram(s) Oral daily  melatonin 3 milliGRAM(s) Oral at bedtime  methylPREDNISolone sodium succinate Injectable 20 milliGRAM(s) IV Push every 6 hours  senna 2 Tablet(s) Oral at bedtime  sodium chloride 3%  Inhalation 4 milliLiter(s) Inhalation every 12 hours      TELEMETRY: 	    ECG:  	  RADIOLOGY:  OTHER: 	  	  LABS:	 	    CARDIAC MARKERS:                                8.8    x     )-----------( x        ( 11 Sep 2022 18:09 )             29.2     09-11    136  |  100  |  24<H>  ----------------------------<  179<H>  4.1   |  25  |  0.60    Ca    9.0      11 Sep 2022 13:52      proBNP: Serum Pro-Brain Natriuretic Peptide: 461 pg/mL (09-08 @ 20:56)  Serum Pro-Brain Natriuretic Peptide: 155 pg/mL (08-27 @ 21:06)    Lipid Profile:   HgA1c:   TSH:         Assessment and plan  ---------------------------  86F w/ extensive hx including severe tracheobronchomalacia (c/b hypoxia 2/2 mucous plugging and recurrent L lung collapse), 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 Very limited hx obtained from pt given mental status, dyspnea, and use of BIPAP. Unable to reach sonSy. History obtained from staff/chart review.    Per ED staff who saw pt when she first arrived, pt was notably dyspneic with increased WOB and wheezing. Supposedly missed use of nightly BIPAP at rehab. Reportedly denied fever and cough. On encounter for admission, pt with BIPAP mask on, appearing slightly lethargic and mildly dyspneic, but was able to answer some questions. Pt endorsed having SOB. Denied pain. Seemed to believe initially that she was at [facility]? but was difficult to understand her responses.    Of note, pt has multiple recent admissions with similar presentation. Most recently was hospitalized from 8/28/22-9/8/22 for SOB, treated with airway clearance and completed 5-day course of Zosyn for empiric coverage of PNA (though per Pulm, unlikely to have PNA). Course c/b PEG displacement and subsequent reinsertion by IR on 8/30. In addition to her usual PEG feeds, pt was also started on puree diet with moderately thickened fluids for pleasure feeds. Also was started on anastrozole for most likely dx of metastatic breast cancer to bones (pt was unable to tolerate further cancer workup of spine lesions). Pt remained full code during recent admissions.  pt with metastatic ca to the bone with sob sec to obesity and underlying lung and cardiac disease  increase sob sec to missing BiPAP at night  may consider pulmonary eval  may consider hospice care/ palliative care  continue Diltiazem  pt will need home o2 and Bipap at night  discussed with family at the bed side , dc planning  dvt prophylaxis high risk

## 2022-09-13 NOTE — PROGRESS NOTE ADULT - ASSESSMENT
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 She also has a history of HTN, HLD, CAD s/p MI with preserved LVEF and moderate aortic stenosis. The patient was admitted to [facility] in December 2021 with fever and abdominal pain due to perforated appendicitis. She was treated with tube drainage and antibiotics for a polymicrobial infection. Hospital course was complicated by respiratory failure due to mucous plugging with complete collapse of the left lung. She underwent several bronchoscopies for pulmonary toilet and was found to have severe tracheobronchomalacia. Her respiratory status has remained "stable" while at [facility] on nebulized bronchodilators and hypertonic saline and without nocturnal NIV. She was admitted in March with hematochezia. The left lower lobe was well aerated on a CT scan of the abdomen and pelvis with only bibasilar subsegmental atelectasis - there was scattered sigmoid diverticulosis without evidence of diverticulitis - interval decrease in size of a fluid collection in the region of the previously perforated appendicitis measuring 2.8 x 1.5 cm previously measuring 4.0 x 2.2 cm - slight increase in mild adjacent inflammatory change. The GI bleed was treated conservatively.  She was admitted on 8/29 with shortness of breath in the setting of back, neck and left lower extremity pain. She required NIV for mild acute hypercapnic respiratory failure that quickly resolved. Her respiratory status remained stable on room air and on her usual nebs and mucolytics. She was started on a puree diet with moderately thickened fluids in addition to PEG feeds following evaluation by the speech and swallow team. She was started on anastrazole for likely metastatic breast cancer to the bone. On the day of discharge, the patient was quite anxious with shortness of breath, chest congestion and wheeze - she was making a grunting noise with expiration. She was returned from [facility] that evening. Currently she is tachypneic and using accessory muscles of respiration. She has an audible wheeze and cough productive of scant sputum. No fevers, chills or sweats. No chest pain/pressure or palpitations.     the patient has new onset bronchospasm with increased work of breathing -  she has severe tracheobronchomalacia that has resulted in mucous plugging with complete left lung collapse requiring several bronchoscopies for pulmonary toilet - the patient was felt not to be a candidate for surgery for the tracheobronchomalacia and stenting was felt to have more risk than benefit     PLAN/RECOMMENDATIONS:    discontinue AVAPS -> start PEG feeds - speech and swallow reevaluation  CXR -> mild increased in left lower lobe atelectasis  transition solumedrol -> prednisolone -> taper as written  albuterol/atrovent nebs q6h  hypertonic saline nebs q12h  guaifenesin 300mg 4 times daily via PEG  incentive spirometry   acapella device   CT scan 8/29 -> bilateral dependent atelectasis - resolution of left lower lobe complete collapse - multiple lytic bone lesions predominantly in the thoracic vertebral bodies which is concerning for metastatic disease   no indication for bronchoscopy at this time  CT scan 8/31 -> multiple lytic lesions are seen throughout the axial and appendicular skeleton, some of which appear increased in size from CT abdomen pelvis 3/16/2022 when evaluated in retrospect - a few lytic lesions involve the left intertrochanteric region and right acetabulum  oncology follow-up    it is likely that the patient has metastatic breast cancer    started on anastrozole  treatment of HTN, HLD, CAD, aortic stenosis per cardiology  DVT prophylaxis - SQ lovenox  analgesics    Will follow with you. Plan of care discussed with the patient and her RN at bedside and the dedicated floor NP    Kennedy Marcano MD, [facility]  [phone]  Pulmonary Medicine

## 2022-09-13 NOTE — PROGRESS NOTE ADULT - ASSESSMENT
89    year old female    h/o hemorrhagic CVA, has  PEG,  HTN, MI,   HLD, gout, right ca  breast   right Ca breast. s/p mastectomy in 2019,  s/p  sentinel node  localization.  4/4,  were  negative  peg dislodged.  IR   replacements  on 1/3/22  s/p rrt  . in past,  for hypoxia. cxr with complete  collapsed  of  left lung, needing broch,   s/ p  bronchoscopy , pt  with  tracheomalacia  and  collapsed  airways,  makes  this a  difficult  situation, as there is no good rx for this     h/o bacteremia. on   pna, perforated  appendicitis,  ?  mets  to  acetabulum, was  seen by dr pacheco   surg/ IR  and  oncology eval dr pacheco   sa w pt.  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 this will not alter rx. pt  will need   extended  course  of ab   on iv  vanco and ertapenem.  till  2/9/.22  ID dr reagan, and per  surg  and  IR  eval,  no intervention   CT  1/20/22, no PE. collection in right side   s/p  rectal bleed.        *  admitted with   presumed  resp  failure/ pt was  sent back from [facility], the same day   pt seen in er.  appears   at her naseline.  no  wheezing /  atousable    ENT eval w/ laryngoscopy notable for vocal cords mobile and intact, no edema, upper airway patent     hypertonic saline for airway clearance   cxr, no pna     *   s/p cva, has  PEG,  npo  ,   on  synthroid, Keppra  ,  *   HTN, on  cardizem,  per  card dr jamison  *  h/o  Ca breast. /, s/p  R  mastectomy  *   obesity, bmi  was   41, now  is  30   *     c/c left  leg contracture ,  remains  bed  bound. functional  paraplegia,  needs  assistance  for  all her  adl's   *  pt  with  h/o  tracheomalacia  and  collapsed  airways,      given pt;s  mlple co morbidities,  pt is  at risk for  re admissions     on nocturnal  bipap    difficult  situation, a s there  is no  good  rx  for  pt's  recurrent lung collapse hypoxia    h/o  of  chronic    mild  tachycardia    re  admission  likely,, given her  airways, and  propensity  for  lung collapse      *   CT chest. lytic  lesions, T  spine/  ribs/ humerus/  oncoloigy  d r ohri.  suspect  metastatic ca breast        son. Sy, updated/ oncologist  has  also  discussed  with  son,  futility of  pursuing  bx. a s pt  is  not an ideal   candidate  for  chemo    CT  A.p ,/ prelim ,  pelvic  mets  per  oncology,   suspect  metastatic ca  breast    was  awaiting   mri  spine/  pt unable  to tolerate  mri   per oncology, no  further  intervention/  and on  Arimidex, now,   per d r ohri  pulm    dr mir,   difficult  situation, as  there  are no  good  options     no good  solution . given her mlple co morbidities   obesity,  bed bound.  functional paraplegia    per  son,   rehab saw pt  was a  bit anxious, and then promptly  returned   to er/ as they  did not  have  a  bipap machine /  care cortn to f/.p  on iv  steroid  by pulm/ awiat duration   needs  24/7  O2/ nocturnal bipap    d/c planning,  when cleared   by   pulm       per  son, pt is  full code

## 2022-09-13 NOTE — PROGRESS NOTE ADULT - ASSESSMENT
Pt is an 85yo F w/ PMH of severe tracheobronchomalacia, HTN, HLD, CAD, CVA w/ residual L sided weakness, Intrinsic lung disease, Aortic stenosis, R breast CA s/p mastectomy, L lung collapse and MRSE p/w dyspnea from rehab. on the same day as discharge   She has a previous hx of resp failure d/t mucus plugging w/ complete L lung collapse and severe tracheobronchomalacia requiring hospitalization at [facility] in March 2022. After discharge to rehab pt was maintained on BiPAP for about one month's time during the evening hours but was subsequently taken off. She was reportedly doing well until until started complaining of an inability to breath and having increased WOB prompting her ED visit 8/28/22 and dc on 9/8/22      She had right simple mastectomy in 4/2019 [id number] breast cancer, ER, [place] positive and Her-2 negative.   She would ideally have received adjuvant hormonal therapy but did not.  CT (12.30.21 @ 14:12) >  BONES: Degenerative changes. T11 compression deformity, unchanged.   Redemonstration of an indeterminate lytic lesion in the right acetabulum.  Patient was in hospital at that time with perforated appendix with collection, managed conservatively.Further evaluation was hampered by her comorbidities though thought was to get bone scan eventually as outpatient.  Now has lytic lesions in T spine  Paraproteinemia eval. unremarkable  Dx can only be confirmed with bone bx but most likely mets from breast.  She also needed MRI spine but could not tolerate it.   Her son Sy understood that further evaluation of spine was not possible and moreover if she needed radiation that can not be done either because of her inabilty to lie flat.   She was started on Arimdex 1 mg PO via PEG tube for most likely dx of metastatic breast cancer and should continue with it.  Her anemia is chronic with no evidence of B12 def or iron def  Patient has remained full code but has multiple comorbidities with no good solution and gets admitted often from respiratory issues etc

## 2022-09-14 NOTE — PROGRESS NOTE ADULT - ASSESSMENT
89    year old female    h/o hemorrhagic CVA, has  PEG,  HTN, MI,   HLD, gout, right ca  breast   right Ca breast. s/p mastectomy in 2019,  s/p  sentinel node  localization.  4/4,  were  negative  peg dislodged.  IR   replacements  on 1/3/22  s/p rrt  . in past,  for hypoxia. cxr with complete  collapsed  of  left lung, needing broch,   s/ p  bronchoscopy , pt  with  tracheomalacia  and  collapsed  airways,  makes  this a  difficult  situation, as there is no good rx for this     h/o bacteremia. on   pna, perforated  appendicitis,  ?  mets  to  acetabulum, was  seen by dr pacheco   surg/ IR  and  oncology eval dr pacheco   sa w pt.  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 this will not alter rx. pt  will need   extended  course  of ab   on iv  vanco and ertapenem.  till  2/9/.22  ID dr reagan, and per  surg  and  IR  eval,  no intervention   CT  1/20/22, no PE. collection in right side   s/p  rectal bleed.        *  admitted with   presumed  resp  failure/ pt was  sent back from [facility], the same day   pt seen in er.  appears   at her naseline.  no  wheezing /  atousable    ENT eval w/ laryngoscopy notable for vocal cords mobile and intact, no edema, upper airway patent     hypertonic saline for airway clearance   cxr, no pna     *   s/p cva, has  PEG,  npo  ,   on  synthroid, Keppra  ,  *   HTN, on  cardizem,  per  card dr jamison  *  h/o  Ca breast. /, s/p  R  mastectomy  *   obesity, bmi  was   41, now  is  30   *     c/c left  leg contracture ,  remains  bed  bound. functional  paraplegia,  needs  assistance  for  all her  adl's   *  pt  with  h/o  tracheomalacia  and  collapsed  airways,      given pt;s  mlple co morbidities,  pt is  at risk for  re admissions     on nocturnal  bipap    difficult  situation, a s there  is no  good  rx  for  pt's  recurrent lung collapse hypoxia    h/o  of  chronic    mild  tachycardia    re  admission  likely,, given her  airways, and  propensity  for  lung collapse      *   CT chest. lytic  lesions, T  spine/  ribs/ humerus/  oncoloigy  d r ohri.  suspect  metastatic ca breast        son. Sy, updated/ oncologist  has  also  discussed  with  son,  futility of  pursuing  bx. a s pt  is  not an ideal   candidate  for  chemo    CT  A.p ,/ prelim ,  pelvic  mets  per  oncology,   suspect  metastatic ca  breast    was  awaiting   mri  spine/  pt unable  to tolerate  mri   per oncology, no  further  intervention/  and on  Arimidex, now,   per d r ohri  pulm    dr mir,   difficult  situation, as  there  are no  good  options     no good  solution . given her mlple co morbidities   obesity,  bed bound.  functional paraplegia    per  son,   rehab saw pt  was a  bit anxious, and then promptly  returned   to er/ as they  did not  have  a  bipap machine /  care cortn to f/.p   on prednisolone  by pulm    needs  24/7  O2/ nocturnal bipap    d/c planning,  when cleared   by   pulm       per  son, pt is  full code                    89    year old female    h/o hemorrhagic CVA, has  PEG,  HTN, MI,   HLD, gout, right ca  breast   right Ca breast. s/p mastectomy in 2019,  s/p  sentinel node  localization.  4/4,  were  negative  peg dislodged.  IR   replacements  on 1/3/22  s/p rrt  . in past,  for hypoxia. cxr with complete  collapsed  of  left lung, needing broch,   s/ p  bronchoscopy , pt  with  tracheomalacia  and  collapsed  airways,  makes  this a  difficult  situation, as there is no good rx for this     h/o bacteremia. on   pna, perforated  appendicitis,  ?  mets  to  acetabulum, was  seen by dr pacheco   surg/ IR  and  oncology eval dr pacheco   sa w pt.  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 this will not alter rx. pt  will need   extended  course  of ab   on iv  vanco and ertapenem.  till  2/9/.22  ID dr reagan, and per  surg  and  IR  eval,  no intervention   CT  1/20/22, no PE. collection in right side   s/p  rectal bleed.        *  admitted with   presumed  resp  failure/ pt was  sent back from [facility], the same day   pt seen in er.  appears   at her naseline.  no  wheezing /  atousable    ENT eval w/ laryngoscopy notable for vocal cords mobile and intact, no edema, upper airway patent     hypertonic saline for airway clearance   cxr, no pna     *   s/p cva, has  PEG,  npo  ,   on  synthroid, Keppra  ,  *   HTN, on  cardizem,  per  card dr jamison  *  h/o  Ca breast. /, s/p  R  mastectomy  *   obesity, bmi  was   41, now  is  30   *     c/c left  leg contracture ,  remains  bed  bound. functional  paraplegia,  needs  assistance  for  all her  adl's   *  pt  with  h/o  tracheomalacia  and  collapsed  airways,      given pt;s  mlple co morbidities,  pt is  at risk for  re admissions     on nocturnal  bipap    difficult  situation, a s there  is no  good  rx  for  pt's  recurrent lung collapse hypoxia    h/o  of  chronic    mild  tachycardia    re  admission  likely,, given her  airways, and  propensity  for  lung collapse      *   CT chest. lytic  lesions, T  spine/  ribs/ humerus/  oncoloigy  d r ohri.  suspect  metastatic ca breast        son. Sy, updated/ oncologist  has  also  discussed  with  son,  futility of  pursuing  bx. a s pt  is  not an ideal   candidate  for  chemo    CT  A.p ,/ prelim ,  pelvic  mets  per  oncology,   suspect  metastatic ca  breast    was  awaiting   mri  spine/  pt unable  to tolerate  mri   per oncology, no  further  intervention/  and on  Arimidex, now,   per d r ohri  pulm    dr mir,   difficult  situation, as  there  are no  good  options     no good  solution . given her mlple co morbidities   obesity,  bed bound.  functional paraplegia    per  son,   rehab saw pt  was a  bit anxious, and then promptly  returned   to er/ as they  did not  have  a  bipap machine /  care cortn to f/.p   on prednisolone  by pulm .  hypergycemia  from steroid   needs  24/7  O2/ nocturnal bipap    d/c planning,  when cleared   by   pulm       per  son, pt is  full code

## 2022-09-14 NOTE — PROGRESS NOTE ADULT - SUBJECTIVE AND OBJECTIVE BOX
afberile  REVIEW OF SYSTEMS:  GEN: no fever,    no chills  RESP: no SOB,   no cough  CVS: no chest pain,   no palpitations  GI: no abdominal pain,   no nausea,   no vomiting,   no constipation,   no diarrhea  : no dysuria,   no frequency  NEURO: no headache,   no dizziness  PSYCH: no depression,   not anxious  Derm : no rash    MEDICATIONS  (STANDING):  albuterol/ipratropium for Nebulization 3 milliLiter(s) Nebulizer every 6 hours  allopurinol 100 milliGRAM(s) Oral daily  anastrozole 1 milliGRAM(s) Oral daily  atorvastatin 20 milliGRAM(s) Oral at bedtime  chlorhexidine 2% Cloths 1 Application(s) Topical <User Schedule>  diltiazem    Tablet 30 milliGRAM(s) Oral three times a day  enoxaparin Injectable 40 milliGRAM(s) SubCutaneous every 24 hours  guaiFENesin Oral Liquid (Sugar-Free) 300 milliGRAM(s) Oral every 6 hours  levETIRAcetam  Solution 750 milliGRAM(s) Oral two times a day  levothyroxine 75 MICROGram(s) Oral daily  melatonin 3 milliGRAM(s) Oral at bedtime  prednisoLONE    3 mG/mL Solution (ORAPRED)   Oral   prednisoLONE    3 mG/mL Solution (ORAPRED) 45 milliGRAM(s) Oral daily  senna 2 Tablet(s) Oral at bedtime  sodium chloride 3%  Inhalation 4 milliLiter(s) Inhalation every 12 hours    MEDICATIONS  (PRN):  acetaminophen     Tablet .. 650 milliGRAM(s) Oral every 6 hours PRN Temp greater or equal to 38C (100.4F), Mild Pain (1 - 3)  baclofen 5 milliGRAM(s) Oral every 12 hours PRN Muscle Spasm  bisacodyl Suppository 10 milliGRAM(s) Rectal daily PRN Constipation  polyethylene glycol 3350 17 Gram(s) Oral daily PRN Constipation  traMADol 50 milliGRAM(s) Oral two times a day PRN Severe Pain (7 - 10)      Vital Signs Last 24 Hrs  T(C): 36.6 (14 Sep 2022 04:02), Max: 36.7 (13 Sep 2022 21:45)  T(F): 97.9 (14 Sep 2022 04:02), Max: 98.1 (13 Sep 2022 21:45)  HR: 96 (14 Sep 2022 04:02) (64 - 96)  BP: 134/78 (14 Sep 2022 04:45) (133/72 - 147/107)  BP(mean): --  RR: 20 (14 Sep 2022 04:02) (18 - 20)  SpO2: 97% (14 Sep 2022 04:02) (97% - 100%)    Parameters below as of 14 Sep 2022 04:02  Patient On (Oxygen Delivery Method): nasal cannula      CAPILLARY BLOOD GLUCOSE      POCT Blood Glucose.: 227 mg/dL (14 Sep 2022 07:49)  POCT Blood Glucose.: 197 mg/dL (14 Sep 2022 06:18)  POCT Blood Glucose.: 270 mg/dL (14 Sep 2022 00:19)  POCT Blood Glucose.: 243 mg/dL (13 Sep 2022 17:16)  POCT Blood Glucose.: 180 mg/dL (13 Sep 2022 12:00)    I&O's Summary    13 Sep 2022 07:01  -  14 Sep 2022 07:00  --------------------------------------------------------  IN: 0 mL / OUT: 850 mL / NET: -850 mL        PHYSICAL EXAM:  HEAD:  Atraumatic, Normocephalic  NECK: Supple, No   JVD  CHEST/LUNG:   no     rales,     no,    rhonchi  HEART: Regular rate and rhythm;         murmur  ABDOMEN: Soft, Nontender, ;   EXTREMITIES:    no    edema  NEUROLOGY:  alert    LABS:                        8.2    4.87  )-----------( 308      ( 13 Sep 2022 15:31 )             27.9     09-13    136  |  99  |  22  ----------------------------<  170<H>  4.4   |  26  |  0.50    Ca    9.2      13 Sep 2022 15:31                  09-13 @ 15:05  3.2  55              Consultant(s) Notes Reviewed:      Care Discussed with Consultants/Other Providers:

## 2022-09-14 NOTE — PROGRESS NOTE ADULT - ASSESSMENT
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 She also has a history of HTN, HLD, CAD s/p MI with preserved LVEF and moderate aortic stenosis. The patient was admitted to [facility] in December 2021 with fever and abdominal pain due to perforated appendicitis. She was treated with tube drainage and antibiotics for a polymicrobial infection. Hospital course was complicated by respiratory failure due to mucous plugging with complete collapse of the left lung. She underwent several bronchoscopies for pulmonary toilet and was found to have severe tracheobronchomalacia. Her respiratory status has remained "stable" while at [facility] on nebulized bronchodilators and hypertonic saline and without nocturnal NIV. She was admitted in March with hematochezia. The left lower lobe was well aerated on a CT scan of the abdomen and pelvis with only bibasilar subsegmental atelectasis - there was scattered sigmoid diverticulosis without evidence of diverticulitis - interval decrease in size of a fluid collection in the region of the previously perforated appendicitis measuring 2.8 x 1.5 cm previously measuring 4.0 x 2.2 cm - slight increase in mild adjacent inflammatory change. The GI bleed was treated conservatively.  She was admitted on 8/29 with shortness of breath in the setting of back, neck and left lower extremity pain. She required NIV for mild acute hypercapnic respiratory failure that quickly resolved. Her respiratory status remained stable on room air and on her usual nebs and mucolytics. She was started on a puree diet with moderately thickened fluids in addition to PEG feeds following evaluation by the speech and swallow team. She was started on anastrazole for likely metastatic breast cancer to the bone. On the day of discharge, the patient was quite anxious with shortness of breath, chest congestion and wheeze - she was making a grunting noise with expiration. She was returned from [facility] that evening. Currently she is tachypneic and using accessory muscles of respiration. She has an audible wheeze and cough productive of scant sputum. No fevers, chills or sweats. No chest pain/pressure or palpitations.     the patient has new onset bronchospasm with increased work of breathing and worsening left lower lobe atelectasis -  she has severe tracheobronchomalacia that has resulted in mucous plugging with complete left lung collapse requiring several bronchoscopies for pulmonary toilet in the past - the patient was felt not to be a candidate for surgery for the tracheobronchomalacia and stenting was felt to have more risk than benefit     metastatic breast cancer    PLAN/RECOMMENDATIONS:    oxygen supplementation to keep saturation greater than 92% - currently on a 3lpm nasal canula  CXR -> mild increased in left lower lobe atelectasis  await chest CT to better evaluate the extent of atelectasis and mucous plugging  prednisolone 45 mg daily -> taper as written  albuterol/atrovent nebs q6h  hypertonic saline nebs q12h  guaifenesin 300mg 4 times daily via PEG  incentive spirometry   acapella device   PEG feeds - would keep NPO as I suspect aspiration caused the recent bout of bronchospasm  CT scan 8/29 -> bilateral dependent atelectasis - resolution of left lower lobe complete collapse - multiple lytic bone lesions predominantly in the thoracic vertebral bodies which is concerning for metastatic disease   no indication for bronchoscopy at this time  CT scan 8/31 -> multiple lytic lesions are seen throughout the axial and appendicular skeleton, some of which appear increased in size from CT abdomen pelvis 3/16/2022 when evaluated in retrospect - a few lytic lesions involve the left intertrochanteric region and right acetabulum  oncology follow-up noted    s/p right simple mastectomy 4/2019 for [id number] breast cancer but did not receive adjuvant hormonal therapy    it is likely that the patient has metastatic breast cancer    started on anastrozole as the patient could not tolerate a MRI and is not a candidate for bone biopsy  treatment of HTN, HLD, CAD, aortic stenosis per cardiology  DVT prophylaxis - SQ lovenox  analgesics    Will follow with you. Plan of care discussed with the patient and her RN at bedside and the dedicated floor NP. Hopeful discharge on 9/16 as long as bronchospasm continues to improve, VBG remains stable and the patient does not need bronchoscopy    Kennedy Marcano MD, [facility]  [phone]  Pulmonary Medicine

## 2022-09-14 NOTE — PROGRESS NOTE ADULT - SUBJECTIVE AND OBJECTIVE BOX
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; PEG in place    INTERVAL HISTORY: awake and alert off NIV for 24 hours after VBG yesterday revealed resolved acute on chronic hypercapnic respiratory failure; no longer grunting with respiration; mild shortness of breath without hypoxemia on a 3lpm nasal canula; occasional cough productive of scant sputum; some chest congestion and wheeze; no fevers, chills or sweats; no chest pain/pressure or palpitations; back on PEG feeds;     REVIEW OF SYSTEMS:  Constitutional: As per interval history  HEENT: Within normal limits  CV: As per interval history  Resp: As per interval history  GI: dysphagia  : Within normal limits  Musculoskeletal: Within normal limits  Skin: Within normal limits  Neurological: CVA - left sided plegia  Psychiatric: Within normal limits  Endocrine: Within normal limits  Hematologic/Lymphatic: Within normal limits  Allergic/Immunologic: Within normal limits    MEDICATIONS:     Pulmonary "  albuterol/ipratropium for Nebulization 3 milliLiter(s) Nebulizer every 6 hours  guaiFENesin Oral Liquid (Sugar-Free) 300 milliGRAM(s) Oral every 6 hours  sodium chloride 3%  Inhalation 4 milliLiter(s) Inhalation every 12 hours    Anti-microbials:    Cardiovascular:  diltiazem    Tablet 30 milliGRAM(s) Oral three times a day    Other:  allopurinol 100 milliGRAM(s) Oral daily  anastrozole 1 milliGRAM(s) Oral daily  atorvastatin 20 milliGRAM(s) Oral at bedtime  chlorhexidine 2% Cloths 1 Application(s) Topical <User Schedule>  enoxaparin Injectable 40 milliGRAM(s) SubCutaneous every 24 hours  levETIRAcetam  Solution 750 milliGRAM(s) Oral two times a day  levothyroxine 75 MICROGram(s) Oral daily  melatonin 3 milliGRAM(s) Oral at bedtime  prednisoLONE    3 mG/mL Solution (ORAPRED)   Oral   prednisoLONE    3 mG/mL Solution (ORAPRED) 45 milliGRAM(s) Oral daily  senna 2 Tablet(s) Oral at bedtime    MEDICATIONS  (PRN):  acetaminophen     Tablet .. 650 milliGRAM(s) Oral every 6 hours PRN Temp greater or equal to 38C (100.4F), Mild Pain (1 - 3)  baclofen 5 milliGRAM(s) Oral every 12 hours PRN Muscle Spasm  bisacodyl Suppository 10 milliGRAM(s) Rectal daily PRN Constipation  polyethylene glycol 3350 17 Gram(s) Oral daily PRN Constipation  traMADol 50 milliGRAM(s) Oral two times a day PRN Severe Pain (7 - 10)        OBJECTIVE:    POCT Blood Glucose.: 227 mg/dL (14 Sep 2022 07:49)  POCT Blood Glucose.: 197 mg/dL (14 Sep 2022 06:18)  POCT Blood Glucose.: 270 mg/dL (14 Sep 2022 00:19)  POCT Blood Glucose.: 243 mg/dL (13 Sep 2022 17:16)  POCT Blood Glucose.: 180 mg/dL (13 Sep 2022 12:00)      PHYSICAL EXAM:       ICU Vital Signs Last 24 Hrs  T(C): 36.6 (14 Sep 2022 04:02), Max: 36.7 (13 Sep 2022 21:45)  T(F): 97.9 (14 Sep 2022 04:02), Max: 98.1 (13 Sep 2022 21:45)  HR: 96 (14 Sep 2022 04:02) (64 - 96)  BP: 134/78 (14 Sep 2022 04:45) (133/72 - 147/107)  BP(mean): --  ABP: --  ABP(mean): --  RR: 20 (14 Sep 2022 04:02) (18 - 20)  SpO2: 97% (14 Sep 2022 04:02) (97% - 100%) on a 3lpm nasal canula    General: Awake. Alert. Cooperative. Appears stated age. Obese. Bedbound.   HEENT: Atraumatic. Normocephalic. Anicteric. Normal oral mucosa. PERRL. EOMI. Mallampati class IV airway. Full face mask  Neck: Supple. Trachea midline. Thyroid without enlargement/tenderness/nodules. No carotid bruit. No JVD. Short and wide.  Cardiovascular: Regular rate and rhythm. S1 S2 normal. III/VI systolic murmur  Respiratory: No respiratory distress. Mild wheeze anteriorly. Kyphosis. Poor chest wall excursion  Abdomen: Soft. Non-tender. Non-distended. No organomegaly. No masses. Normal bowel sounds. Obese. PEG.  Extremities: Warm to touch. No clubbing or cyanosis. No pedal edema. Large legs. Left leg contracted under the right leg  Pulses: 2+ peripheral pulses all extremities.	  Skin: Normal skin color. No rashes or lesions. No ecchymoses. No cyanosis. Warm to touch.  Lymph Nodes: Cervical, supraclavicular and axillary nodes normal  Neurological: Left lower extremity plegia with contraction. Left upper extremity is quite weak. A and O x 3    LABS:                          8.2    4.87  )-----------( 308      ( 13 Sep 2022 15:31 )             27.9     CBC    WBC  4.87 <==, 7.95 <==, 7.60 <==, 11.07 <==    Hemoglobin  8.2 <<==, 8.8 <<==, 7.2 <<==, 8.2 <<==, 8.3 <<==    Hematocrit  27.9 <==, 29.2 <==, 24.2 <==, 26.7 <==, 27.3 <==    Platelets  308 <==, 252 <==, 223 <==, 245 <==      136  |  99  |  22  ----------------------------<  170<H>    09-13  4.4   |  26  |  0.50      LYTES    sodium  136 <==, 136 <==, 136 <==, 135 <==    potassium   4.4 <==, 4.1 <==, 5.0 <==, 4.9 <==    chloride  99 <==, 100 <==, 99 <==, 96 <==    carbon dioxide  26 <==, 25 <==, 22 <==, 27 <==    =============================================================================================  RENAL FUNCTION:    Creatinine:   0.50  <<==, 0.60  <<==, 0.63  <<==, 0.68  <<==    BUN:   22 <==, 24 <==, 25 <==, 27 <==    ============================================================================================    calcium   9.2 <==, 9.0 <==, 9.3 <==, 9.8 <==    phos   3.7 <==    mag   2.2 <==, 2.1 <==    ============================================================================================  LFTs    AST:   14 <== , 18 <==     ALT:  13  <== , 13  <==     AP:  156  <=, 176  <=    Bili:  0.4  <=, 0.4  <=    Venous Blood Gas:  09-13 @ 15:05  7.41/45/55/28/88.1  VBG Lactate: --    Venous Blood Gas:  09-12 @ 14:34  7.35/56/51/31/82.5  VBG Lactate: 1.4    < from: TTE with Doppler (w/Cont) (01.21.22 @ 14:08) >    Patient name: FRANKI MEHTA  YOB: 1935   Age: 86 (F)   MR#: [id number]  Study Date: 1/21/2022  ------------------------------------------------------------------------  Dimensions:    Normal Values:  LA:     2.5    2.0 - 4.0 cm  Ao:     3.3    2.0 - 3.8 cm  SEPTUM: 1.6    0.6 - 1.2 cm  PWT:    0.9    0.6 - 1.1 cm  LVIDd:  3.6    3.0 - 5.6 cm  LVIDs:  2.5    1.8 - 4.0 cm  Derived variables:  LVMI: 77 g/m2  RWT: 0.50  Fractional short: 31 %  EF (Visual Estimate): 70-75 %  ------------------------------------------------------------------------  Conclusions:  1. Concentric left ventricular hypertrophy.  2. Hyperdynamic left ventricular systolic function.  Endocardial visualization enhanced with intravenous  injection of Ultrasonic Enhancing Agent (Definity).  3. The right ventricle is not well visualized; grossly  normal right ventricular systolic function.  Pt refused to proceed with exam.  Limited Doppler study.  No trans aortic gradients.  Unable to rule out endocarditis.  ------------------------------------------------------------------------  Confirmed on 1/21/2022 - 15:42:57 by COMPA Castillo  ------------------------------------------------------------------------  ---------------------------------------------------------------------------------------------------------------      MICROBIOLOGY:     Respiratory Viral Panel with COVID-19 by RYAN (09.08.22 @ 21:38)   Rapid RVP Result: [facility]   SARS-CoV-2: [facility]  This Respiratory Panel uses polymerase chain reaction (PCR) to detect for   adenovirus; coronavirus (HKU1, NL63, 229E, OC43); human metapneumovirus   (hMPV); human enterovirus/rhinovirus (Entero/RV); influenza A; influenza   A/H1; influenza A/H3; influenza A/H1-2009; influenza B; parainfluenza   viruses 1, 2, 3, 4; respiratory syncytial virus; Mycoplasma pneumoniae;   Chlamydophila pneumoniae; and SARS-CoV-2.     Culture - Blood (09.08.22 @ 20:39)   Specimen Source: .Blood Blood-Peripheral   Culture Results:   No growth to date.     Culture - Blood (09.08.22 @ 20:25)   Specimen Source: .Blood Blood-Peripheral   Culture Results:   No growth to date.     RADIOLOGY:  [x ] Chest radiographs reviewed and interpreted by me    EXAM:  XR CHEST PORTABLE URGENT 1V                          PROCEDURE DATE:  09/12/2022      FINDINGS:    Gastrostomy tube.  The heart is enlarged.  Patchy left mid and lower lung opacity is minimally increased. The right   lung is clear.  No pleural effusion or pneumothorax.    IMPRESSION:  Minimally increased left mid to lower lung atelectasis..    ROBSON PIZANO MD; Resident Radiology  This document has been electronically signed.  DIANE CLARK MD; Attending Radiologist  This document has been electronically signed. Sep 12 2022  2:46PM    --------------------------------------------------------------------------------------------------------------  EXAM:  DUPLEX SCAN EXT VEINS LOWER BI                          PROCEDURE DATE:  09/10/2022      IMPRESSION:  Limited study  No evidence of deep venous thrombosis in either lower extremity.  Limited visualization of the calf veins.    KEYLA ROMERO MD; Attending Radiologist  This document has been electronically signed. Sep 10 2022 10:42AM  ---------------------------------------------------------------------------------------------------------------  EXAM:  CT CHEST                          PROCEDURE DATE:  08/29/2022      FINDINGS:    LYMPH NODES: No mediastinal lymphadenopathy.    HEART/VASCULATURE: The heart is normal in size. No pericardial effusion.   Aortic valve calcifications noted.    AIRWAYS/LUNGS/PLEURA: Patent central airways. Right lower lobe dependent   atelectasis appear similar to prior. Patchy groundglass opacities the   right apex are improved. Interval resolution of left lower lobe complete   collapse. There is mild dependent atelectasis of the left upper and lower   lobes.    UPPER ABDOMEN: Gallbladder stones.    BONES/SOFT TISSUES: Status post right mastectomy. Status post left   humerus ORIF. Marked kyphosis. Multiple lytic bone lesions predominantly   in the posterior and lateral thoracic vertebral bodies. It is unclear if   there is any spinal canal extension. Lytic lesions are alsoseen at the   right posterior seventh rib and right humeral head    OTHER: Right maxillary sinus is opacified.      IMPRESSION:  Bilateral dependent atelectasis. Interval resolution of left lower lobe   complete collapse.    Multiple lytic bone lesions, predominantly in the thoracic vertebral   bodies which is concerning for metastatic disease. Unclear if there is   any spinal canal extension. Recommend MRI of the thoracic spine for   further evaluation.    BAILEY STARR MD; Resident Radiologist  This document has been electronically signed.  HAY IRVIN MD; Attending Radiologist  This document has been electronically signed. Aug 29 2022 12:01PM  ---------------------------------------------------------------------------------------------------------------  EXAM:  CT ABDOMEN AND PELVIS IC                          PROCEDURE DATE:  08/31/2022      IMPRESSION:    Multiple lytic lesions are seen throughout the axial and appendicular   skeleton, some of which appear increased in size from CT abdomen pelvis   3/16/2022 when evaluated in retrospect. A few lytic lesions involve the   left intertrochanteric region and right acetabulum.    Endometrial thickening. Recommend dedicated pelvic sonogram.    Cystic lesions are seen within the pancreas. Recommend a dedicated MRI on   a nonemergent basis.    SULY KENDRICK MD; Resident Radiology  This document has been electronically signed.  BRITTANI MALCOLM MD; Attending Radiologist  This document has been electronically signed. Sep  1 2022 11:01AM  ---------------------------------------------------------------------------------------------------------------  EXAM:  XR HUMERUS MIN 2 VIEWS RT                          PROCEDURE DATE:  08/30/2022      EXAM:  Internal/external rotation AP right humerus from 8/30/2022 at 0927.   Compared to appearance on previous day chest CT.    IMPRESSION:  Generalized osteopenia. Redemonstrated focal lytic lesion in proximal   medial humeral diaphysis with small area of permeative cortical   destruction. No additional radiographically appreciated suspicious lytic   or blastic lesions in remaining imaged regions.    No current fractures or dislocations.    Preserved shoulder and elbow joint spaces.    IV cannula with attached tubing overlies upper arm.    MINE NICE MD; Attending Radiologist  This document has been electronically signed. Aug 30 2022 10:39AM  ---------------------------------------------------------------------------------------------------------------  EXAM:  DUPLEX EXT VEINS UPPER LT                          PROCEDURE DATE:  09/01/2022      IMPRESSION:  No evidence of left upper extremity deep venous thrombosis.    FAWN FITZGERALD MD; Attending Radiologist  This document has been electronically signed. Sep  1 2022  1:10PM  ---------------------------------------------------------------------------------------------------------------  EXAM:  DUPLEX SCAN EXT VEINS LOWER BI                          PROCEDURE DATE:  08/31/2022      IMPRESSION:  No evidence of deep venous thrombosis in either lower extremity.    KEYLA ROMERO MD; Attending Radiologist  This document has been electronically signed. Aug 31 2022  7:59PM  ---------------------------------------------------------------------------

## 2022-09-14 NOTE — PROGRESS NOTE ADULT - SUBJECTIVE AND OBJECTIVE BOX
CARDIOLOGY     PROGRESS  NOTE   ________________________________________________    CHIEF COMPLAINT:Patient is a 87y old  Female who presents with a chief complaint of SOB (13 Sep 2022 11:23)  no complain.  	  REVIEW OF SYSTEMS:  CONSTITUTIONAL: No fever, weight loss, or fatigue  EYES: No eye pain, visual disturbances, or discharge  ENT:  No difficulty hearing, tinnitus, vertigo; No sinus or throat pain  NECK: No pain or stiffness  RESPIRATORY: No cough, wheezing, chills or hemoptysis; decrease  Shortness of Breath  CARDIOVASCULAR: No chest pain, palpitations, passing out, dizziness, or leg swelling  GASTROINTESTINAL: No abdominal or epigastric pain. No nausea, vomiting, or hematemesis; No diarrhea or constipation. No melena or hematochezia.  GENITOURINARY: No dysuria, frequency, hematuria, or incontinence  NEUROLOGICAL: No headaches, memory loss, loss of strength, numbness, or tremors  SKIN: No itching, burning, rashes, or lesions   LYMPH Nodes: No enlarged glands  ENDOCRINE: No heat or cold intolerance; No hair loss  MUSCULOSKELETAL: No joint pain or swelling; No muscle, back, or extremity pain  PSYCHIATRIC: No depression, anxiety, mood swings, or difficulty sleeping  HEME/LYMPH: No easy bruising, or bleeding gums  ALLERGY AND IMMUNOLOGIC: No hives or eczema	    [ ] All others negative	  [ ] Unable to obtain    PHYSICAL EXAM:  T(C): 36.6 (09-14-22 @ 04:02), Max: 36.7 (09-13-22 @ 21:45)  HR: 96 (09-14-22 @ 04:02) (64 - 96)  BP: 134/78 (09-14-22 @ 04:45) (133/72 - 147/107)  RR: 20 (09-14-22 @ 04:02) (18 - 20)  SpO2: 97% (09-14-22 @ 04:02) (97% - 100%)  Wt(kg): --  I&O's Summary    13 Sep 2022 07:01  -  14 Sep 2022 07:00  --------------------------------------------------------  IN: 0 mL / OUT: 850 mL / NET: -850 mL        Appearance: Normal	  HEENT:   Normal oral mucosa, PERRL, EOMI	  Lymphatic: No lymphadenopathy  Cardiovascular: Normal S1 S2, No JVD, + murmurs, + edema  Respiratory: rhonchi  Psychiatry: A & O x 3, Mood & affect appropriate  Gastrointestinal:  Soft, Non-tender, + BS	  Skin: No rashes, No ecchymoses, No cyanosis	  Neurologic: Non-focal  Extremities: Normal range of motion, No clubbing, cyanosis , + edema  Vascular: Peripheral pulses palpable 2+ bilaterally    MEDICATIONS  (STANDING):  albuterol/ipratropium for Nebulization 3 milliLiter(s) Nebulizer every 6 hours  allopurinol 100 milliGRAM(s) Oral daily  anastrozole 1 milliGRAM(s) Oral daily  atorvastatin 20 milliGRAM(s) Oral at bedtime  chlorhexidine 2% Cloths 1 Application(s) Topical <User Schedule>  diltiazem    Tablet 30 milliGRAM(s) Oral three times a day  enoxaparin Injectable 40 milliGRAM(s) SubCutaneous every 24 hours  guaiFENesin Oral Liquid (Sugar-Free) 300 milliGRAM(s) Oral every 6 hours  levETIRAcetam  Solution 750 milliGRAM(s) Oral two times a day  levothyroxine 75 MICROGram(s) Oral daily  melatonin 3 milliGRAM(s) Oral at bedtime  prednisoLONE    3 mG/mL Solution (ORAPRED)   Oral   prednisoLONE    3 mG/mL Solution (ORAPRED) 45 milliGRAM(s) Oral daily  senna 2 Tablet(s) Oral at bedtime  sodium chloride 3%  Inhalation 4 milliLiter(s) Inhalation every 12 hours      TELEMETRY: 	    ECG:  	  RADIOLOGY:  OTHER: 	  	  LABS:	 	    CARDIAC MARKERS:                                8.2    4.87  )-----------( 308      ( 13 Sep 2022 15:31 )             27.9     09-13    136  |  99  |  22  ----------------------------<  170<H>  4.4   |  26  |  0.50    Ca    9.2      13 Sep 2022 15:31      proBNP: Serum Pro-Brain Natriuretic Peptide: 461 pg/mL (09-08 @ 20:56)  Serum Pro-Brain Natriuretic Peptide: 155 pg/mL (08-27 @ 21:06)    Lipid Profile:   HgA1c:   TSH:     She was started on Arimdex 1 mg PO via PEG tube for most likely dx of metastatic breast cancer and should continue with it.  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 Very limited hx obtained from pt given mental status, dyspnea, and use of BIPAP. Unable to reach sonSy. History obtained from staff/chart review.    Per ED staff who saw pt when she first arrived, pt was notably dyspneic with increased WOB and wheezing. Supposedly missed use of nightly BIPAP at rehab. Reportedly denied fever and cough. On encounter for admission, pt with BIPAP mask on, appearing slightly lethargic and mildly dyspneic, but was able to answer some questions. Pt endorsed having SOB. Denied pain. Seemed to believe initially that she was at [facility]? but was difficult to understand her responses.    Of note, pt has multiple recent admissions with similar presentation. Most recently was hospitalized from 8/28/22-9/8/22 for SOB, treated with airway clearance and completed 5-day course of Zosyn for empiric coverage of PNA (though per Pulm, unlikely to have PNA). Course c/b PEG displacement and subsequent reinsertion by IR on 8/30. In addition to her usual PEG feeds, pt was also started on puree diet with moderately thickened fluids for pleasure feeds. Also was started on anastrozole for most likely dx of metastatic breast cancer to bones (pt was unable to tolerate further cancer workup of spine lesions). Pt remained full code during recent admissions.  pt with metastatic ca to the bone with sob sec to obesity and underlying lung and cardiac disease  increase sob sec to missing BiPAP at night  may consider pulmonary eval  may consider hospice care/ palliative care  continue Diltiazem  pt will need home o2 and Bipap at night  discussed with family at the bed side , dc planning  dvt prophylaxis high risk  pt with bone mets, no further nieto/ onc noted

## 2022-09-15 NOTE — PROGRESS NOTE ADULT - ASSESSMENT
89    year old female    h/o hemorrhagic CVA, has  PEG,  HTN, MI,   HLD, gout, right ca  breast   right Ca breast. s/p mastectomy in 2019,  s/p  sentinel node  localization.  4/4,  were  negative  peg dislodged.  IR   replacements  on 1/3/22  s/p rrt  . in past,  for hypoxia. cxr with complete  collapsed  of  left lung, needing broch,   s/ p  bronchoscopy , pt  with  tracheomalacia  and  collapsed  airways,  makes  this a  difficult  situation, as there is no good rx for this     h/o bacteremia. on   pna, perforated  appendicitis,  ?  mets  to  acetabulum, was  seen by dr pacheco   surg/ IR  and  oncology eval dr pacheco   sa w pt.  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 this will not alter rx. pt  will need   extended  course  of ab   on iv  vanco and ertapenem.  till  2/9/.22  ID dr reagan, and per  surg  and  IR  eval,  no intervention   CT  1/20/22, no PE. collection in right side   s/p  rectal bleed.        *  admitted with   presumed  resp  failure/ pt was  sent back from [facility], the same day   pt seen in er.  appears   at her naseline.  no  wheezing /  atousable    ENT eval w/ laryngoscopy notable for vocal cords mobile and intact, no edema, upper airway patent     hypertonic saline for airway clearance   cxr, no pna     *   s/p cva, has  PEG,  npo  ,   on  synthroid, Keppra  ,  *   HTN, on  cardizem,  per  card dr jamison  *  h/o  Ca breast. /, s/p  R  mastectomy  *   obesity, bmi  was   41, now  is  30   *     c/c left  leg contracture ,  remains  bed  bound. functional  paraplegia,  needs  assistance  for  all her  adl's   *  pt  with  h/o  tracheomalacia  and  collapsed  airways,      given pt;s  mlple co morbidities,  pt is  at risk for  re admissions     on nocturnal  bipap    difficult  situation, a s there  is no  good  rx  for  pt's  recurrent lung collapse hypoxia    h/o  of  chronic    mild  tachycardia    re  admission  likely,, given her  airways, and  propensity  for  lung collapse      *   CT chest. lytic  lesions, T  spine/  ribs/ humerus/  oncoloigy  d r ohri.  suspect  metastatic ca breast        son. Sy, updated/ oncologist  has  also  discussed  with  son,  futility of  pursuing  bx. a s pt  is  not an ideal   candidate  for  chemo    CT  A.p ,/ prelim ,  pelvic  mets  per  oncology,   suspect  metastatic ca  breast    was  awaiting   mri  spine/  pt unable  to tolerate  mri   per oncology, no  further  intervention/  and on  Arimidex, now,   per d r junior  pulm    dr mir,   difficult  situation, as  there  are no  good  options     no good  solution . given her mlple co morbidities   obesity,  bed bound.  functional paraplegia    per  son,   rehab saw pt  was a  bit anxious, and then promptly  returned   to er/ as they  did not  have  a  bipap machine /  care cortn to f/.p   on prednisolone  by pulm .  hypergycemia  from steroid   needs  24/7  O2/ nocturnal bipap      start   d/c, when cleraed  by  pulm      per  son, pt is  full code                    89    year old female    h/o hemorrhagic CVA, has  PEG,  HTN, MI,   HLD, gout, right ca  breast   right Ca breast. s/p mastectomy in 2019,  s/p  sentinel node  localization.  4/4,  were  negative  peg dislodged.  IR   replacements  on 1/3/22  s/p rrt  . in past,  for hypoxia. cxr with complete  collapsed  of  left lung, needing broch,   s/ p  bronchoscopy , pt  with  tracheomalacia  and  collapsed  airways,  makes  this a  difficult  situation, as there is no good rx for this     h/o bacteremia. on   pna, perforated  appendicitis,  ?  mets  to  acetabulum, was  seen by dr pacheco   surg/ IR  and  oncology eval dr pacheco   sa w pt.  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 this will not alter rx. pt  will need   extended  course  of ab   on iv  vanco and ertapenem.  till  2/9/.22  ID dr reagan, and per  surg  and  IR  eval,  no intervention   CT  1/20/22, no PE. collection in right side   s/p  rectal bleed.        *  admitted with   presumed  resp  failure/ pt was  sent back from [facility], the same day   pt seen in er.  appears   at her naseline.  no  wheezing /  atousable    ENT eval w/ laryngoscopy notable for vocal cords mobile and intact, no edema, upper airway patent     hypertonic saline for airway clearance   cxr, no pna     *   s/p cva, has  PEG,  npo  ,   on  synthroid, Keppra  ,  *   HTN, on  cardizem,  per  card dr jamison  *  h/o  Ca breast. /, s/p  R  mastectomy  *   obesity, bmi  was   41, now  is  30   *     c/c left  leg contracture ,  remains  bed  bound. functional  paraplegia,  needs  assistance  for  all her  adl's   *  pt  with  h/o  tracheomalacia  and  collapsed  airways,      given pt;s  mlple co morbidities,  pt is  at risk for  re admissions     on nocturnal  bipap    difficult  situation, a s there  is no  good  rx  for  pt's  recurrent lung collapse hypoxia    h/o  of  chronic    mild  tachycardia    re  admission  likely,, given her  airways, and  propensity  for  lung collapse      *   CT chest. lytic  lesions, T  spine/  ribs/ humerus/  oncoloigy  d r ohri.  suspect  metastatic ca breast        son. Sy, updated/ oncologist  has  also  discussed  with  son,  futility of  pursuing  bx. a s pt  is  not an ideal   candidate  for  chemo    CT  A.p ,/ prelim ,  pelvic  mets  per  oncology,   suspect  metastatic ca  breast    was  awaiting   mri  spine/  pt unable  to tolerate  mri   per oncology, no  further  intervention/  and on  Arimidex, now,   per d r junior  pulm    dr mir,   difficult  situation, as  there  are no  good  options     no good  solution . given her mlple co morbidities   obesity,  bed bound.  functional paraplegia    per  son,   rehab saw pt  was a  bit anxious, and then promptly  returned   to er/ as they  did not  have  a  bipap machine /  care cortn to f/.p   on prednisolone  by pulm .  hypergycemia  from steroid   needs  24/7  O2/ nocturnal bipap      start   d/c, when cleared   by  pulm/  at present  not  cleraed  by pulm       per  son, pt is  full code

## 2022-09-15 NOTE — PROGRESS NOTE ADULT - SUBJECTIVE AND OBJECTIVE BOX
CARDIOLOGY     PROGRESS  NOTE   ________________________________________________    CHIEF COMPLAINT:Patient is a 87y old  Female who presents with a chief complaint of SOB (14 Sep 2022 09:21)  sig anxiety.  	  REVIEW OF SYSTEMS:  CONSTITUTIONAL: No fever, weight loss, or fatigue  EYES: No eye pain, visual disturbances, or discharge  ENT:  No difficulty hearing, tinnitus, vertigo; No sinus or throat pain  NECK: No pain or stiffness  RESPIRATORY: No cough, wheezing, chills or hemoptysis; No Shortness of Breath  CARDIOVASCULAR: No chest pain, palpitations, passing out, dizziness, or leg swelling  GASTROINTESTINAL: No abdominal or epigastric pain. No nausea, vomiting, or hematemesis; No diarrhea or constipation. No melena or hematochezia.  GENITOURINARY: No dysuria, frequency, hematuria, or incontinence  NEUROLOGICAL: No headaches, memory loss, loss of strength, numbness, or tremors  SKIN: No itching, burning, rashes, or lesions   LYMPH Nodes: No enlarged glands  ENDOCRINE: No heat or cold intolerance; No hair loss  MUSCULOSKELETAL: No joint pain or swelling; No muscle, back, or extremity pain  PSYCHIATRIC: No depression, anxiety, mood swings, or difficulty sleeping  HEME/LYMPH: No easy bruising, or bleeding gums  ALLERGY AND IMMUNOLOGIC: No hives or eczema	    [ ] All others negative	  [ ] Unable to obtain    PHYSICAL EXAM:  T(C): 36.5 (09-15-22 @ 04:32), Max: 36.7 (09-14-22 @ 21:31)  HR: 93 (09-15-22 @ 04:32) (93 - 100)  BP: 114/71 (09-15-22 @ 04:32) (110/70 - 138/72)  RR: 18 (09-15-22 @ 04:32) (18 - 18)  SpO2: 100% (09-15-22 @ 04:32) (95% - 100%)  Wt(kg): --  I&O's Summary      Appearance: Normal	  HEENT:   Normal oral mucosa, PERRL, EOMI	  Lymphatic: No lymphadenopathy  Cardiovascular: Normal S1 S2, No JVD, + murmurs, No edema  Respiratory: rhonchi  Psychiatry: A & O x 3, Mood & affect appropriate  Gastrointestinal:  Soft, Non-tender, + BS	  Skin: No rashes, No ecchymoses, No cyanosis	  Neurologic: Non-focal  Extremities: Normal range of motion, No clubbing, cyanosis or edema  Vascular: Peripheral pulses palpable 2+ bilaterally    MEDICATIONS  (STANDING):  albuterol/ipratropium for Nebulization 3 milliLiter(s) Nebulizer every 6 hours  allopurinol 100 milliGRAM(s) Oral daily  anastrozole 1 milliGRAM(s) Oral daily  atorvastatin 20 milliGRAM(s) Oral at bedtime  chlorhexidine 2% Cloths 1 Application(s) Topical <User Schedule>  diltiazem    Tablet 30 milliGRAM(s) Oral three times a day  enoxaparin Injectable 40 milliGRAM(s) SubCutaneous every 24 hours  guaiFENesin Oral Liquid (Sugar-Free) 300 milliGRAM(s) Oral every 6 hours  insulin lispro (ADMELOG) corrective regimen sliding scale   SubCutaneous three times a day before meals  insulin lispro (ADMELOG) corrective regimen sliding scale   SubCutaneous at bedtime  levETIRAcetam  Solution 750 milliGRAM(s) Oral two times a day  levothyroxine 75 MICROGram(s) Oral daily  melatonin 3 milliGRAM(s) Oral at bedtime  prednisoLONE    3 mG/mL Solution (ORAPRED)   Oral   prednisoLONE    3 mG/mL Solution (ORAPRED) 45 milliGRAM(s) Oral daily  senna 2 Tablet(s) Oral at bedtime  sodium chloride 3%  Inhalation 4 milliLiter(s) Inhalation every 12 hours      TELEMETRY: 	    ECG:  	  RADIOLOGY:  OTHER: 	  	  LABS:	 	    CARDIAC MARKERS:                                7.9    5.91  )-----------( 318      ( 14 Sep 2022 11:44 )             27.1     09-14    136  |  96  |  34<H>  ----------------------------<  305<H>  4.5   |  24  |  0.68    Ca    9.2      14 Sep 2022 11:44      proBNP: Serum Pro-Brain Natriuretic Peptide: 461 pg/mL (09-08 @ 20:56)  Serum Pro-Brain Natriuretic Peptide: 155 pg/mL (08-27 @ 21:06)    Lipid Profile:   HgA1c:   TSH:         Assessment and plan  ---------------------------  86F w/ extensive hx including severe tracheobronchomalacia (c/b hypoxia 2/2 mucous plugging and recurrent L lung collapse), 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 Very limited hx obtained from pt given mental status, dyspnea, and use of BIPAP. Unable to reach sonSy. History obtained from staff/chart review.    Per ED staff who saw pt when she first arrived, pt was notably dyspneic with increased WOB and wheezing. Supposedly missed use of nightly BIPAP at rehab. Reportedly denied fever and cough. On encounter for admission, pt with BIPAP mask on, appearing slightly lethargic and mildly dyspneic, but was able to answer some questions. Pt endorsed having SOB. Denied pain. Seemed to believe initially that she was at [facility]? but was difficult to understand her responses.    Of note, pt has multiple recent admissions with similar presentation. Most recently was hospitalized from 8/28/22-9/8/22 for SOB, treated with airway clearance and completed 5-day course of Zosyn for empiric coverage of PNA (though per Pulm, unlikely to have PNA). Course c/b PEG displacement and subsequent reinsertion by IR on 8/30. In addition to her usual PEG feeds, pt was also started on puree diet with moderately thickened fluids for pleasure feeds. Also was started on anastrozole for most likely dx of metastatic breast cancer to bones (pt was unable to tolerate further cancer workup of spine lesions). Pt remained full code during recent admissions.  pt with metastatic ca to the bone with sob sec to obesity and underlying lung and cardiac disease  increase sob sec to missing BiPAP at night  may consider pulmonary eval  may consider hospice care/ palliative care  continue Diltiazem  pt will need home o2 and Bipap at night  discussed with family at the bed side , dc planning  dvt prophylaxis high risk  pt with bone mets, no further nieto/ onc noted  xanax added for anxiety prn

## 2022-09-15 NOTE — PROGRESS NOTE ADULT - ASSESSMENT
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 She also has a history of HTN, HLD, CAD s/p MI with preserved LVEF and moderate aortic stenosis. The patient was admitted to [facility] in December 2021 with fever and abdominal pain due to perforated appendicitis. She was treated with tube drainage and antibiotics for a polymicrobial infection. Hospital course was complicated by respiratory failure due to mucous plugging with complete collapse of the left lung. She underwent several bronchoscopies for pulmonary toilet and was found to have severe tracheobronchomalacia. Her respiratory status has remained "stable" while at [facility] on nebulized bronchodilators and hypertonic saline and without nocturnal NIV. She was admitted in March with hematochezia. The left lower lobe was well aerated on a CT scan of the abdomen and pelvis with only bibasilar subsegmental atelectasis - there was scattered sigmoid diverticulosis without evidence of diverticulitis - interval decrease in size of a fluid collection in the region of the previously perforated appendicitis measuring 2.8 x 1.5 cm previously measuring 4.0 x 2.2 cm - slight increase in mild adjacent inflammatory change. The GI bleed was treated conservatively.  She was admitted on 8/29 with shortness of breath in the setting of back, neck and left lower extremity pain. She required NIV for mild acute hypercapnic respiratory failure that quickly resolved. Her respiratory status remained stable on room air and on her usual nebs and mucolytics. She was started on a puree diet with moderately thickened fluids in addition to PEG feeds following evaluation by the speech and swallow team. She was started on anastrazole for likely metastatic breast cancer to the bone. On the day of discharge, the patient was quite anxious with shortness of breath, chest congestion and wheeze - she was making a grunting noise with expiration. She was returned from [facility] that evening. Currently she is tachypneic and using accessory muscles of respiration. She has an audible wheeze and cough productive of scant sputum. No fevers, chills or sweats. No chest pain/pressure or palpitations.     the patient has new onset bronchospasm with increased work of breathing and worsening left lower lobe atelectasis -  she has severe tracheobronchomalacia that has resulted in mucous plugging with complete left lung collapse requiring several bronchoscopies for pulmonary toilet in the past - the patient was felt not to be a candidate for surgery for the tracheobronchomalacia and stenting was felt to have more risk than benefit     metastatic breast cancer    PLAN/RECOMMENDATIONS:    oxygen supplementation to keep saturation greater than 92% - currently on a 3lpm nasal canula  VBG 9/14 -> 7.31/51/48/26/76.2 c/w mild acute respiratory acidosis  CXR -> mild increase in left lower lobe atelectasis  chest CT -> patent central airways - subsegmental atelectasis is seen in the bilateral upper and lower lobes - no pleural effusion -> the patient does not need a bronchoscopy  prednisolone 45 mg daily -> taper as written  albuterol/atrovent nebs q6h  hypertonic saline nebs q12h  guaifenesin 300mg 4 times daily via PEG  incentive spirometry   acapella device   PEG feeds - would keep NPO as I suspect aspiration caused this recent bout of bronchospasm  CT scan 8/31 -> multiple lytic lesions are seen throughout the axial and appendicular skeleton, some of which appear increased in size from CT abdomen pelvis 3/16/2022 when evaluated in retrospect - a few lytic lesions involve the left intertrochanteric region and right acetabulum  oncology follow-up noted    s/p right simple mastectomy 4/2019 for pTqcpN0 breast cancer but did not receive adjuvant hormonal therapy    it is likely that the patient has metastatic breast cancer    started on anastrozole as the patient could not tolerate a MRI and is not a candidate for bone biopsy  treatment of HTN, HLD, CAD, aortic stenosis per cardiology  DVT prophylaxis - SQ lovenox  glucose control  analgesics    Will follow with you. Plan of care discussed with the patient and her RN at bedside and the dedicated floor NP. Hopeful discharge tomorrow as long as bronchospasm continues to improve and  VBG is without a respiratory acidosis     I will be away from Friday 9/16 until Monday 9/19. Dr. Liborio Dexter  will be covering our service. Please call [phone] with questions or clinical changes. Thank you.      Kennedy Marcano MD, [facility]  [phone]  Pulmonary Medicine

## 2022-09-15 NOTE — PROGRESS NOTE ADULT - SUBJECTIVE AND OBJECTIVE BOX
aferile    REVIEW OF SYSTEMS:  GEN: no fever,    no chills  RESP: no SOB,   no cough  CVS: no chest pain,   no palpitations  GI: no abdominal pain,   no nausea,   no vomiting,   no constipation,   no diarrhea  : no dysuria,   no frequency  NEURO: no headache,   no dizziness  PSYCH: no depression,   not anxious  Derm : no rash    MEDICATIONS  (STANDING):  albuterol/ipratropium for Nebulization 3 milliLiter(s) Nebulizer every 6 hours  allopurinol 100 milliGRAM(s) Oral daily  anastrozole 1 milliGRAM(s) Oral daily  atorvastatin 20 milliGRAM(s) Oral at bedtime  chlorhexidine 2% Cloths 1 Application(s) Topical <User Schedule>  diltiazem    Tablet 30 milliGRAM(s) Oral three times a day  enoxaparin Injectable 40 milliGRAM(s) SubCutaneous every 24 hours  guaiFENesin Oral Liquid (Sugar-Free) 300 milliGRAM(s) Oral every 6 hours  insulin lispro (ADMELOG) corrective regimen sliding scale   SubCutaneous three times a day before meals  insulin lispro (ADMELOG) corrective regimen sliding scale   SubCutaneous at bedtime  levETIRAcetam  Solution 750 milliGRAM(s) Oral two times a day  levothyroxine 75 MICROGram(s) Oral daily  melatonin 3 milliGRAM(s) Oral at bedtime  prednisoLONE    3 mG/mL Solution (ORAPRED)   Oral   prednisoLONE    3 mG/mL Solution (ORAPRED) 45 milliGRAM(s) Oral daily  senna 2 Tablet(s) Oral at bedtime  sodium chloride 3%  Inhalation 4 milliLiter(s) Inhalation every 12 hours    MEDICATIONS  (PRN):  acetaminophen     Tablet .. 650 milliGRAM(s) Oral every 6 hours PRN Temp greater or equal to 38C (100.4F), Mild Pain (1 - 3)  ALPRAZolam 0.25 milliGRAM(s) Oral every 8 hours PRN severe anxiety  baclofen 5 milliGRAM(s) Oral every 12 hours PRN Muscle Spasm  bisacodyl Suppository 10 milliGRAM(s) Rectal daily PRN Constipation  polyethylene glycol 3350 17 Gram(s) Oral daily PRN Constipation  traMADol 50 milliGRAM(s) Oral two times a day PRN Severe Pain (7 - 10)      Vital Signs Last 24 Hrs  T(C): 36.5 (15 Sep 2022 04:32), Max: 36.7 (14 Sep 2022 21:31)  T(F): 97.7 (15 Sep 2022 04:32), Max: 98 (14 Sep 2022 21:31)  HR: 93 (15 Sep 2022 04:32) (93 - 100)  BP: 114/71 (15 Sep 2022 04:32) (110/70 - 138/72)  BP(mean): --  RR: 18 (15 Sep 2022 04:32) (18 - 18)  SpO2: 100% (15 Sep 2022 04:32) (95% - 100%)    Parameters below as of 15 Sep 2022 04:32  Patient On (Oxygen Delivery Method): nasal cannula      CAPILLARY BLOOD GLUCOSE      POCT Blood Glucose.: 170 mg/dL (15 Sep 2022 08:03)  POCT Blood Glucose.: 303 mg/dL (14 Sep 2022 21:52)  POCT Blood Glucose.: 328 mg/dL (14 Sep 2022 16:44)  POCT Blood Glucose.: 297 mg/dL (14 Sep 2022 12:09)    I&O's Summary      PHYSICAL EXAM:  HEAD:  Atraumatic, Normocephalic  NECK: Supple, No   JVD  CHEST/LUNG:   no     rales,     no,    rhonchi  HEART: Regular rate and rhythm;         murmur  ABDOMEN: Soft, Nontender, ;   EXTREMITIES: no       edema  NEUROLOGY:  alert    LABS:                        7.9    5.91  )-----------( 318      ( 14 Sep 2022 11:44 )             27.1     09-14    136  |  96  |  34<H>  ----------------------------<  305<H>  4.5   |  24  |  0.68    Ca    9.2      14 Sep 2022 11:44                  09-14 @ 10:10  -1.2  48              Consultant(s) Notes Reviewed:      Care Discussed with Consultants/Other Providers:

## 2022-09-15 NOTE — PROGRESS NOTE ADULT - SUBJECTIVE AND OBJECTIVE BOX
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; PEG in place    INTERVAL HISTORY: awake and alert; no longer grunting with respiration although she has wheezing in her neck when it is flexed; mild shortness of breath without hypoxemia on a 3lpm nasal canula; occasional cough productive of scant sputum; mild chest congestion and wheeze; no fevers, chills or sweats; no chest pain/pressure or palpitations; back on PEG feeds;     REVIEW OF SYSTEMS:  Constitutional: As per interval history  HEENT: Within normal limits  CV: As per interval history  Resp: As per interval history  GI: dysphagia  : Within normal limits  Musculoskeletal: Within normal limits  Skin: Within normal limits  Neurological: CVA - left sided plegia  Psychiatric: Within normal limits  Endocrine: Within normal limits  Hematologic/Lymphatic: Within normal limits  Allergic/Immunologic: Within normal limits    MEDICATIONS:     Pulmonary "  albuterol/ipratropium for Nebulization 3 milliLiter(s) Nebulizer every 6 hours  guaiFENesin Oral Liquid (Sugar-Free) 300 milliGRAM(s) Oral every 6 hours  sodium chloride 3%  Inhalation 4 milliLiter(s) Inhalation every 12 hours    Anti-microbials:    Cardiovascular:  diltiazem    Tablet 30 milliGRAM(s) Oral three times a day    Other:  allopurinol 100 milliGRAM(s) Oral daily  anastrozole 1 milliGRAM(s) Oral daily  atorvastatin 20 milliGRAM(s) Oral at bedtime  chlorhexidine 2% Cloths 1 Application(s) Topical <User Schedule>  enoxaparin Injectable 40 milliGRAM(s) SubCutaneous every 24 hours  insulin lispro (ADMELOG) corrective regimen sliding scale   SubCutaneous three times a day before meals  insulin lispro (ADMELOG) corrective regimen sliding scale   SubCutaneous at bedtime  levETIRAcetam  Solution 750 milliGRAM(s) Oral two times a day  levothyroxine 75 MICROGram(s) Oral daily  melatonin 3 milliGRAM(s) Oral at bedtime  prednisoLONE    3 mG/mL Solution (ORAPRED)   Oral   prednisoLONE    3 mG/mL Solution (ORAPRED) 45 milliGRAM(s) Oral daily  senna 2 Tablet(s) Oral at bedtime    MEDICATIONS  (PRN):  acetaminophen     Tablet .. 650 milliGRAM(s) Oral every 6 hours PRN Temp greater or equal to 38C (100.4F), Mild Pain (1 - 3)  ALPRAZolam 0.25 milliGRAM(s) Oral every 8 hours PRN severe anxiety  baclofen 5 milliGRAM(s) Oral every 12 hours PRN Muscle Spasm  bisacodyl Suppository 10 milliGRAM(s) Rectal daily PRN Constipation  polyethylene glycol 3350 17 Gram(s) Oral daily PRN Constipation  traMADol 50 milliGRAM(s) Oral two times a day PRN Severe Pain (7 - 10)    OBJECTIVE:    POCT Blood Glucose.: 170 mg/dL (15 Sep 2022 08:03)  POCT Blood Glucose.: 303 mg/dL (14 Sep 2022 21:52)  POCT Blood Glucose.: 328 mg/dL (14 Sep 2022 16:44)  POCT Blood Glucose.: 297 mg/dL (14 Sep 2022 12:09)      PHYSICAL EXAM:       ICU Vital Signs Last 24 Hrs  T(C): 36.5 (15 Sep 2022 04:32), Max: 36.7 (14 Sep 2022 21:31)  T(F): 97.7 (15 Sep 2022 04:32), Max: 98 (14 Sep 2022 21:31)  HR: 93 (15 Sep 2022 04:32) (93 - 100)  BP: 114/71 (15 Sep 2022 04:32) (110/70 - 138/72)  BP(mean): --  ABP: --  ABP(mean): --  RR: 18 (15 Sep 2022 04:32) (18 - 18)  SpO2: 100% (15 Sep 2022 04:32) (95% - 100%) on a 3lpm nasal canula    General: Awake. Alert. Cooperative. Appears stated age. Obese. Bedbound.   HEENT: Atraumatic. Normocephalic. Anicteric. Normal oral mucosa. PERRL. EOMI. Mallampati class IV airway. Poor dentition.  Neck: Supple. Trachea midline. Thyroid without enlargement/tenderness/nodules. No carotid bruit. No JVD. Short and wide. Wheezing in the upper airway when her neck is flexed.  Cardiovascular: Regular rate and rhythm. S1 S2 normal. III/VI systolic murmur  Respiratory: No respiratory distress. Mild wheeze anteriorly. Kyphosis. Poor chest wall excursion  Abdomen: Soft. Non-tender. Non-distended. No organomegaly. No masses. Normal bowel sounds. Obese. PEG.  Extremities: Warm to touch. No clubbing or cyanosis. No pedal edema. Large legs. Left leg contracted under the right leg  Pulses: 2+ peripheral pulses all extremities.	  Skin: Normal skin color. No rashes or lesions. No ecchymoses. No cyanosis. Warm to touch.  Lymph Nodes: Cervical, supraclavicular and axillary nodes normal  Neurological: Left lower extremity plegia with contraction. Left upper extremity is quite weak. A and O x 3    LABS:                          7.9    5.91  )-----------( 318      ( 14 Sep 2022 11:44 )             27.1     CBC    WBC  5.91 <==, 4.87 <==, 7.95 <==, 7.60 <==, 11.07 <==    Hemoglobin  7.9 <<==, 8.2 <<==, 8.8 <<==, 7.2 <<==, 8.2 <<==, 8.3 <<==    Hematocrit  27.1 <==, 27.9 <==, 29.2 <==, 24.2 <==, 26.7 <==, 27.3 <==    Platelets  318 <==, 308 <==, 252 <==, 223 <==, 245 <==      136  |  96  |  34<H>  ----------------------------<  305<H>    09-14  4.5   |  24  |  0.68      LYTES    sodium  136 <==, 136 <==, 136 <==, 136 <==, 135 <==    potassium   4.5 <==, 4.4 <==, 4.1 <==, 5.0 <==, 4.9 <==    chloride  96 <==, 99 <==, 100 <==, 99 <==, 96 <==    carbon dioxide  24 <==, 26 <==, 25 <==, 22 <==, 27 <==    =============================================================================================  RENAL FUNCTION:    Creatinine:   0.68  <<==, 0.50  <<==, 0.60  <<==, 0.63  <<==, 0.68  <<==    BUN:   34 <==, 22 <==, 24 <==, 25 <==, 27 <==    ============================================================================================    calcium   9.2 <==, 9.2 <==, 9.0 <==, 9.3 <==, 9.8 <==    phos   3.7 <==    mag   2.2 <==, 2.1 <==    ============================================================================================  LFTs    AST:   14 <== , 18 <==     ALT:  13  <== , 13  <==     AP:  156  <=, 176  <=    Bili:  0.4  <=, 0.4  <=    Venous Blood Gas:  09-14 @ 10:10  7.31/51/48/26/76.2  VBG Lactate: --    Venous Blood Gas:  09-13 @ 15:05  7.41/45/55/28/88.1  VBG Lactate: --    Venous Blood Gas:  09-12 @ 14:34  7.35/56/51/31/82.5  VBG Lactate: 1.4    < from: TTE with Doppler (w/Cont) (01.21.22 @ 14:08) >    Patient name: FRANKI MEHTA  YOB: 1935   Age: 86 (F)   MR#: [id number]  Study Date: 1/21/2022  ------------------------------------------------------------------------  Dimensions:    Normal Values:  LA:     2.5    2.0 - 4.0 cm  Ao:     3.3    2.0 - 3.8 cm  SEPTUM: 1.6    0.6 - 1.2 cm  PWT:    0.9    0.6 - 1.1 cm  LVIDd:  3.6    3.0 - 5.6 cm  LVIDs:  2.5    1.8 - 4.0 cm  Derived variables:  LVMI: 77 g/m2  RWT: 0.50  Fractional short: 31 %  EF (Visual Estimate): 70-75 %  ------------------------------------------------------------------------  Conclusions:  1. Concentric left ventricular hypertrophy.  2. Hyperdynamic left ventricular systolic function.  Endocardial visualization enhanced with intravenous  injection of Ultrasonic Enhancing Agent (Definity).  3. The right ventricle is not well visualized; grossly  normal right ventricular systolic function.  Pt refused to proceed with exam.  Limited Doppler study.  No trans aortic gradients.  Unable to rule out endocarditis.  ------------------------------------------------------------------------  Confirmed on 1/21/2022 - 15:42:57 by COMPA CastilloE  ------------------------------------------------------------------------  ---------------------------------------------------------------------------------------------------------------      MICROBIOLOGY:     Respiratory Viral Panel with COVID-19 by RYAN (09.08.22 @ 21:38)   Rapid RVP Result: [facility]   SARS-CoV-2: [facility]  This Respiratory Panel uses polymerase chain reaction (PCR) to detect for   adenovirus; coronavirus (HKU1, NL63, 229E, OC43); human metapneumovirus   (hMPV); human enterovirus/rhinovirus (Entero/RV); influenza A; influenza   A/H1; influenza A/H3; influenza A/H1-2009; influenza B; parainfluenza   viruses 1, 2, 3, 4; respiratory syncytial virus; Mycoplasma pneumoniae;   Chlamydophila pneumoniae; and SARS-CoV-2.     Culture - Blood (09.08.22 @ 20:39)   Specimen Source: .Blood Blood-Peripheral   Culture Results:   No growth to date.     Culture - Blood (09.08.22 @ 20:25)   Specimen Source: .Blood Blood-Peripheral   Culture Results:   No growth to date.     RADIOLOGY:  [x ] Chest radiographs reviewed and interpreted by me    EXAM:  XR CHEST PORTABLE URGENT 1V                          PROCEDURE DATE:  09/12/2022      FINDINGS:    Gastrostomy tube.  The heart is enlarged.  Patchy left mid and lower lung opacity is minimally increased. The right   lung is clear.  No pleural effusion or pneumothorax.    IMPRESSION:  Minimally increased left mid to lower lung atelectasis..    ROBSON PIZANO MD; Resident Radiology  This document has been electronically signed.  DIANE CLARK MD; Attending Radiologist  This document has been electronically signed. Sep 12 2022  2:46PM  --------------------------------------------------------------------------------------------------------------  EXAM:  CT CHEST                          PROCEDURE DATE:  09/14/2022      FINDINGS:    LUNGS AND AIRWAYS: Patent central airways.  Subsegmental atelectasis is   seen in the bilateral upper and lower lobes.  PLEURA: No pleural effusion.  MEDIASTINUM AND GIOVANA: No lymphadenopathy.  VESSELS: Calcifications of the aorta, aortic valve and coronary arteries.  HEART: Heart size is normal. No pericardial effusion.  CHEST WALL AND LOWER NECK: Within normal limits.  VISUALIZED UPPER ABDOMEN: Cholelithiasis.  BONES: Multiple lytic bone lesions are again seen including a destructive   lesion at the level of the T5 vertebral body that may extend into the   left neural foramen (3:42), unchanged from prior CT chest.    IMPRESSION:  Subsegmental atelectasis in the bilateral upper and lower lobes.    Lytic bone lesions as described above, unchanged from prior CT chest   8/29/2022. Recommend MR thoracic spine for further evaluation.     ALEXANDRIA COLLINS MD; Resident Radiologist  This document has been electronically signed.  HAY IRVIN MD; Attending Radiologist  This document has been electronically signed. Sep 14 2022  3:23PM  ---------------------------------------------------------------------------------------------------------------  EXAM:  DUPLEX SCAN EXT VEINS LOWER BI                          PROCEDURE DATE:  09/10/2022      IMPRESSION:  Limited study  No evidence of deep venous thrombosis in either lower extremity.  Limited visualization of the calf veins.    KEYLA ROMERO MD; Attending Radiologist  This document has been electronically signed. Sep 10 2022 10:42AM  ---------------------------------------------------------------------------------------------------------------  EXAM:  CT ABDOMEN AND PELVIS IC                          PROCEDURE DATE:  08/31/2022      IMPRESSION:    Multiple lytic lesions are seen throughout the axial and appendicular   skeleton, some of which appear increased in size from CT abdomen pelvis   3/16/2022 when evaluated in retrospect. A few lytic lesions involve the   left intertrochanteric region and right acetabulum.    Endometrial thickening. Recommend dedicated pelvic sonogram.    Cystic lesions are seen within the pancreas. Recommend a dedicated MRI on   a nonemergent basis.    SULY KENDRICK MD; Resident Radiology  This document has been electronically signed.  BRITTANI MALCOLM MD; Attending Radiologist  This document has been electronically signed. Sep  1 2022 11:01AM  ---------------------------------------------------------------------------------------------------------------  EXAM:  XR HUMERUS MIN 2 VIEWS RT                          PROCEDURE DATE:  08/30/2022      EXAM:  Internal/external rotation AP right humerus from 8/30/2022 at 0927.   Compared to appearance on previous day chest CT.    IMPRESSION:  Generalized osteopenia. Redemonstrated focal lytic lesion in proximal   medial humeral diaphysis with small area of permeative cortical   destruction. No additional radiographically appreciated suspicious lytic   or blastic lesions in remaining imaged regions.    No current fractures or dislocations.    Preserved shoulder and elbow joint spaces.    IV cannula with attached tubing overlies upper arm.    MINE NICE MD; Attending Radiologist  This document has been electronically signed. Aug 30 2022 10:39AM  ---------------------------------------------------------------------------------------------------------------  EXAM:  DUPLEX EXT VEINS UPPER LT                          PROCEDURE DATE:  09/01/2022      IMPRESSION:  No evidence of left upper extremity deep venous thrombosis.    FAWN FITZGERALD MD; Attending Radiologist  This document has been electronically signed. Sep  1 2022  1:10PM  ---------------------------------------------------------------------------------------------------------------  EXAM:  DUPLEX SCAN EXT VEINS LOWER BI                          PROCEDURE DATE:  08/31/2022      IMPRESSION:  No evidence of deep venous thrombosis in either lower extremity.    KEYLA ROMERO MD; Attending Radiologist  This document has been electronically signed. Aug 31 2022  7:59PM  ---------------------------------------------------------------------------

## 2022-09-16 NOTE — PROGRESS NOTE ADULT - ASSESSMENT
ASSESSMENT:     87 year old gentlewoman, lifelong non-smoker, well known to our group without history of intrinsic lung disease. 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 She also has a history of HTN, HLD, CAD s/p MI with preserved LVEF and moderate aortic stenosis.    She was admitted on 8/29 with shortness of breath in the setting of back, neck and left lower extremity pain. She required NIV for mild acute hypercapnic respiratory failure that quickly resolved. Her respiratory status remained stable on room air and on her usual nebs and mucolytics. She was started on a puree diet with moderately thickened fluids in addition to PEG feeds following evaluation by the speech and swallow team. She was started on anastrazole for likely metastatic breast cancer to the bone. On the day of discharge, the patient was quite anxious with shortness of breath, chest congestion and wheeze - she was making a grunting noise with expiration. She was returned from [facility] that evening. She is still subjectively dyspneic although VBG looks better. CT without large consolidation or large plug/atelectasis. No fevers, chills or sweats. No chest pain/pressure or palpitations.      she has severe tracheobronchomalacia that has resulted in mucous plugging with complete left lung collapse requiring several bronchoscopies for pulmonary toilet in the past - the patient was felt not to be a candidate for surgery for the tracheobronchomalacia and stenting was felt to have more risk than benefit     metastatic breast cancer    PLAN/RECOMMENDATIONS:    oxygen supplementation to keep saturation greater than 92% - currently on a 3lpm nasal canula  VB 9/16 - pH 7.37, pCO2 55 consistent with chronic compensated resp acidosis  chest CT -> patent central airways - subsegmental atelectasis is seen in the bilateral upper and lower lobes - no pleural effusion -> the patient does not need a bronchoscopy  prednisolone 45 mg daily -> taper as written in orders  albuterol/atrovent nebs q6h  hypertonic saline nebs q12h  guaifenesin 300mg 4 times daily via PEG  incentive spirometry   acapella device   Oncology has seen pt.    s/p right simple mastectomy 4/2019 for [id number] breast cancer but did not receive adjuvant hormonal therapy; per notes/signout, it is likely that the patient has metastatic breast cancer    started on anastrozole as the patient could not tolerate a MRI and is not a candidate for bone biopsy  treatment of HTN, HLD, CAD, aortic stenosis per cardiology  DVT prophylaxis - SQ lovenox    She would be a candidate for chronic NIV after discharge at her outpatient living situation      Liborio Dexter MD  Pulmonary Medicine  [phone]

## 2022-09-16 NOTE — CHART NOTE - NSCHARTNOTEFT_GEN_A_CORE
Discussed with Dr. Marcano and Prednisolone discontinued and pt started on solumedrol 20mg IVP Q6h. Will continue to monitor.     Susy Vásquez  [phone]

## 2022-09-16 NOTE — PROGRESS NOTE ADULT - SUBJECTIVE AND OBJECTIVE BOX
CARDIOLOGY     PROGRESS  NOTE   ________________________________________________    CHIEF COMPLAINT:Patient is a 87y old  Female who presents with a chief complaint of SOB (16 Sep 2022 07:18)  no complain.  	  REVIEW OF SYSTEMS:  CONSTITUTIONAL: No fever, weight loss, or fatigue  EYES: No eye pain, visual disturbances, or discharge  ENT:  No difficulty hearing, tinnitus, vertigo; No sinus or throat pain  NECK: No pain or stiffness  RESPIRATORY: No cough, wheezing, chills or hemoptysis; No Shortness of Breath  CARDIOVASCULAR: No chest pain, palpitations, passing out, dizziness, or leg swelling  GASTROINTESTINAL: No abdominal or epigastric pain. No nausea, vomiting, or hematemesis; No diarrhea or constipation. No melena or hematochezia.  GENITOURINARY: No dysuria, frequency, hematuria, or incontinence  NEUROLOGICAL: No headaches, memory loss, loss of strength, numbness, or tremors  SKIN: No itching, burning, rashes, or lesions   LYMPH Nodes: No enlarged glands  ENDOCRINE: No heat or cold intolerance; No hair loss  MUSCULOSKELETAL: No joint pain or swelling; No muscle, back, or extremity pain  PSYCHIATRIC: No depression, anxiety, mood swings, or difficulty sleeping  HEME/LYMPH: No easy bruising, or bleeding gums  ALLERGY AND IMMUNOLOGIC: No hives or eczema	    [ ] All others negative	  [ ] Unable to obtain    PHYSICAL EXAM:  T(C): 37.1 (09-16-22 @ 04:24), Max: 37.1 (09-16-22 @ 04:24)  HR: 84 (09-16-22 @ 04:24) (84 - 97)  BP: 121/91 (09-16-22 @ 05:32) (100/73 - 138/76)  RR: 18 (09-16-22 @ 04:24) (18 - 18)  SpO2: 98% (09-16-22 @ 04:24) (97% - 100%)  Wt(kg): --  I&O's Summary    15 Sep 2022 07:01  -  16 Sep 2022 07:00  --------------------------------------------------------  IN: 0 mL / OUT: 1550 mL / NET: -1550 mL        Appearance: Normal	  HEENT:   Normal oral mucosa, PERRL, EOMI	  Lymphatic: No lymphadenopathy  Cardiovascular: Normal S1 S2, No JVD, + murmurs, No edema  Respiratory: rhonchi  Gastrointestinal:  Soft, Non-tender, + BS	  Skin: No rashes, No ecchymoses, No cyanosis	  Neurologic: Non-focal  Extremities: Normal range of motion, No clubbing, cyanosis or edema  Vascular: Peripheral pulses palpable 2+ bilaterally    MEDICATIONS  (STANDING):  albuterol/ipratropium for Nebulization 3 milliLiter(s) Nebulizer every 6 hours  allopurinol 100 milliGRAM(s) Oral daily  anastrozole 1 milliGRAM(s) Oral daily  atorvastatin 20 milliGRAM(s) Oral at bedtime  chlorhexidine 2% Cloths 1 Application(s) Topical <User Schedule>  diltiazem    Tablet 30 milliGRAM(s) Oral three times a day  enoxaparin Injectable 40 milliGRAM(s) SubCutaneous every 24 hours  guaiFENesin Oral Liquid (Sugar-Free) 300 milliGRAM(s) Oral every 6 hours  insulin lispro (ADMELOG) corrective regimen sliding scale   SubCutaneous at bedtime  insulin lispro (ADMELOG) corrective regimen sliding scale   SubCutaneous three times a day before meals  levETIRAcetam  Solution 750 milliGRAM(s) Oral two times a day  levothyroxine 75 MICROGram(s) Oral daily  melatonin 3 milliGRAM(s) Oral at bedtime  prednisoLONE    3 mG/mL Solution (ORAPRED)   Oral   senna 2 Tablet(s) Oral at bedtime  sodium chloride 3%  Inhalation 4 milliLiter(s) Inhalation every 12 hours      TELEMETRY: 	    ECG:  	  RADIOLOGY:  OTHER: 	  	  LABS:	 	    CARDIAC MARKERS:                                8.1    9.55  )-----------( 298      ( 16 Sep 2022 05:38 )             27.0     09-16    136  |  99  |  33<H>  ----------------------------<  124<H>  4.5   |  29  |  0.59    Ca    9.2      16 Sep 2022 05:38    TPro  6.9  /  Alb  3.6  /  TBili  0.2  /  DBili  x   /  AST  22  /  ALT  11  /  AlkPhos  158<H>  09-15    proBNP: Serum Pro-Brain Natriuretic Peptide: 461 pg/mL (09-08 @ 20:56)  Serum Pro-Brain Natriuretic Peptide: 155 pg/mL (08-27 @ 21:06)    Lipid Profile:   HgA1c:   TSH:         Assessment and plan  ---------------------------  86F w/ extensive hx including severe tracheobronchomalacia (c/b hypoxia 2/2 mucous plugging and recurrent L lung collapse), 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 Very limited hx obtained from pt given mental status, dyspnea, and use of BIPAP. Unable to reach sonSy. History obtained from staff/chart review.    Per ED staff who saw pt when she first arrived, pt was notably dyspneic with increased WOB and wheezing. Supposedly missed use of nightly BIPAP at rehab. Reportedly denied fever and cough. On encounter for admission, pt with BIPAP mask on, appearing slightly lethargic and mildly dyspneic, but was able to answer some questions. Pt endorsed having SOB. Denied pain. Seemed to believe initially that she was at [facility]? but was difficult to understand her responses.    Of note, pt has multiple recent admissions with similar presentation. Most recently was hospitalized from 8/28/22-9/8/22 for SOB, treated with airway clearance and completed 5-day course of Zosyn for empiric coverage of PNA (though per Pulm, unlikely to have PNA). Course c/b PEG displacement and subsequent reinsertion by IR on 8/30. In addition to her usual PEG feeds, pt was also started on puree diet with moderately thickened fluids for pleasure feeds. Also was started on anastrozole for most likely dx of metastatic breast cancer to bones (pt was unable to tolerate further cancer workup of spine lesions). Pt remained full code during recent admissions.  pt with metastatic ca to the bone with sob sec to obesity and underlying lung and cardiac disease  increase sob sec to missing BiPAP at night  may consider pulmonary eval  may consider hospice care/ palliative care  continue Diltiazem  pt will need home o2 and Bipap at night  discussed with family at the bed side , dc planning  dvt prophylaxis high risk  pt with bone mets, no further nieto/ onc noted  xanax added for anxiety prn  dc planning

## 2022-09-16 NOTE — PROGRESS NOTE ADULT - SUBJECTIVE AND OBJECTIVE BOX
Follow-up Pulm Progress Note    The patient was seen and examined. Notes reviewed and discussed with staff/team as applicable      Very dyspneic after being changed following a BM.    Denies: Chest pain, increased cough, colored phlegm, hemoptysis, N/V/D, neck stiffness,   ROS complaining of pain all over    Vital Signs Last 24 Hrs  T(C): 37.1 (16 Sep 2022 04:24), Max: 37.1 (16 Sep 2022 04:24)  T(F): 98.8 (16 Sep 2022 04:24), Max: 98.8 (16 Sep 2022 04:24)  HR: 84 (16 Sep 2022 04:24) (84 - 97)  BP: 121/91 (16 Sep 2022 05:32) (100/73 - 138/76)  BP(mean): --  RR: 18 (16 Sep 2022 04:24) (18 - 18)  SpO2: 98% (16 Sep 2022 04:24) (97% - 100%)    Parameters below as of 16 Sep 2022 04:24  Patient On (Oxygen Delivery Method): nasal cannula              09-15 @ 07:01  -  09-16 @ 07:00  --------------------------------------------------------  IN: 0 mL / OUT: 1550 mL / NET: -1550 mL          Medications:  MEDICATIONS  (STANDING):  albuterol/ipratropium for Nebulization 3 milliLiter(s) Nebulizer every 6 hours  allopurinol 100 milliGRAM(s) Oral daily  anastrozole 1 milliGRAM(s) Oral daily  atorvastatin 20 milliGRAM(s) Oral at bedtime  chlorhexidine 2% Cloths 1 Application(s) Topical <User Schedule>  diltiazem    Tablet 30 milliGRAM(s) Oral three times a day  enoxaparin Injectable 40 milliGRAM(s) SubCutaneous every 24 hours  guaiFENesin Oral Liquid (Sugar-Free) 300 milliGRAM(s) Oral every 6 hours  insulin lispro (ADMELOG) corrective regimen sliding scale   SubCutaneous three times a day before meals  insulin lispro (ADMELOG) corrective regimen sliding scale   SubCutaneous at bedtime  levETIRAcetam  Solution 750 milliGRAM(s) Oral two times a day  levothyroxine 75 MICROGram(s) Oral daily  melatonin 3 milliGRAM(s) Oral at bedtime  prednisoLONE    3 mG/mL Solution (ORAPRED)   Oral   senna 2 Tablet(s) Oral at bedtime  sodium chloride 3%  Inhalation 4 milliLiter(s) Inhalation every 12 hours    MEDICATIONS  (PRN):  acetaminophen     Tablet .. 650 milliGRAM(s) Oral every 6 hours PRN Temp greater or equal to 38C (100.4F), Mild Pain (1 - 3)  ALPRAZolam 0.25 milliGRAM(s) Oral every 8 hours PRN severe anxiety  baclofen 5 milliGRAM(s) Oral every 12 hours PRN Muscle Spasm  bisacodyl Suppository 10 milliGRAM(s) Rectal daily PRN Constipation  polyethylene glycol 3350 17 Gram(s) Oral daily PRN Constipation  traMADol 50 milliGRAM(s) Oral two times a day PRN Severe Pain (7 - 10)      Allergies    Toradol (Urticaria (Mild to Mod); Rash (Mild to Mod))      Physical Examination:    chronically ill appearing elderly obese female, some audible wheezes when neck flexed  Neck: no JVD, LAD, accessory muscle use  PULM: few scattered wheezes.  CVS: Regular rate and rhythm, S1S2, +m  Abdomen: obese soft  Extremities: no edema, obese, L weakness  Neuro: L weakness      LABS:                        8.1    9.55  )-----------( 298      ( 16 Sep 2022 05:38 )             27.0     09-16    136  |  99  |  33<H>  ----------------------------<  124<H>  4.5   |  29  |  0.59    Ca    9.2      16 Sep 2022 05:38    TPro  6.9  /  Alb  3.6  /  TBili  0.2  /  DBili  x   /  AST  22  /  ALT  11  /  AlkPhos  158<H>  09-15      Blood Gas Profile - Venous (09.16.22 @ 05:40)   pH, Venous: 7.37   pCO2, Venous: 55 mmHg   pO2, Venous: 37 mmHg   HCO3, Venous: 32 mmol/L   Base Excess, Venous: 5.8 mmol/L   Oxygen Saturation, Venous: 61.6 %   Total CO2, Venous: 34 mmol/L     CAPILLARY BLOOD GLUCOSE      POCT Blood Glucose.: 169 mg/dL (16 Sep 2022 07:50)        < from: CT Chest No Cont (09.14.22 @ 12:32) >    ACC: [id number] EXAM:  CT CHEST                          PROCEDURE DATE:  09/14/2022          INTERPRETATION:  CLINICAL INFORMATION: Tracheobronchomalacia, mucus   plugging, left lung atelectasis, follow-up    COMPARISON: Chest radiograph 9/12/2022, CT chest 8/29/2022    CONTRAST/COMPLICATIONS:  IV Contrast: NONE  Oral Contrast: NONE  Complications: None reported at time of study completion    PROCEDURE:  CT of the Chest was performed.  Sagittal and coronal reformats were performed.    FINDINGS:    LUNGS AND AIRWAYS: Patent central airways.  Subsegmental atelectasis is   seen in the bilateral upper and lower lobes.  PLEURA: No pleural effusion.  MEDIASTINUM AND GIOVANA: No lymphadenopathy.  VESSELS: Calcifications of the aorta, aortic valve and coronary arteries.  HEART: Heart size is normal. No pericardial effusion.  CHEST WALL AND LOWER NECK: Within normal limits.  VISUALIZED UPPER ABDOMEN: Cholelithiasis.  BONES: Multiple lytic bone lesions are again seen including a destructive   lesion at the level of the T5 vertebral body that may extend into the   left neural foramen (3:42), unchanged from prior CT chest.    IMPRESSION:  Subsegmental atelectasis in the bilateral upper and lower lobes.    Lytic bone lesions as described above, unchanged from prior CT chest   8/29/2022. Recommend MR thoracic spine for further evaluation.        --- End of Report ---           ALEXANDRIA COLLINS MD; Resident Radiologist  This document has been electronically signed.  HAY IRVIN MD; Attending Radiologist  This document has been electronically signed. Sep 14 2022  3:23PM    < end of copied text >

## 2022-09-16 NOTE — DISCHARGE NOTE PROVIDER - NSDCMRMEDTOKEN_GEN_ALL_CORE_FT
allopurinol 100 mg oral tablet: 1 tab(s) by gastrostomy tube once a day  anastrozole 1 mg oral tablet: 1 tab(s) by gastrostomy tube once a day  atorvastatin 20 mg oral tablet: 1 tab(s) by gastrostomy tube once a day (at bedtime)  baclofen 10 mg oral tablet: 1 tab(s) by gastrostomy tube 2 times a day  dilTIAZem 30 mg oral tablet: 1 tab(s) by gastrostomy tube 3 times a day  escitalopram 10 mg oral tablet: 1 tab(s) by gastrostomy tube once a day  ipratropium-albuterol 0.5 mg-2.5 mg/3 mL inhalation solution: 3 milliliter(s) inhaled every 6 hours  levETIRAcetam 100 mg/mL oral solution: 7.5 milliliter(s) by gastrostomy tube 2 times a day  levothyroxine 75 mcg (0.075 mg) oral tablet: 1 tab(s) by gastrostomy tube once a day  melatonin 3 mg oral tablet: 1 tab(s) by gastrostomy tube once a day (at bedtime)  sodium chloride 3% inhalation solution: 4 milliliter(s) inhaled every 12 hours  traMADol 50 mg oral tablet: 1 tab(s) by gastrostomy tube 2 times a day, As Needed  Vitamin B12 1000 mcg oral tablet: 1 tab(s) by gastrostomy tube once a day

## 2022-09-16 NOTE — PROGRESS NOTE ADULT - ASSESSMENT
89    year old female    h/o hemorrhagic CVA, has  PEG,  HTN, MI,   HLD, gout, right ca  breast   right Ca breast. s/p mastectomy in 2019,  s/p  sentinel node  localization.  4/4,  were  negative  peg dislodged.  IR   replacements  on 1/3/22  s/p rrt  . in past,  for hypoxia. cxr with complete  collapsed  of  left lung, needing broch,   s/ p  bronchoscopy , pt  with  tracheomalacia  and  collapsed  airways,  makes  this a  difficult  situation, as there is no good rx for this     h/o bacteremia. on   pna, perforated  appendicitis,  ?  mets  to  acetabulum, was  seen by dr pacheco   surg/ IR  and  oncology eval dr pacheco   sa w pt.  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 this will not alter rx. pt  will need   extended  course  of ab   on iv  vanco and ertapenem.  till  2/9/.22  ID dr reagan, and per  surg  and  IR  eval,  no intervention   CT  1/20/22, no PE. collection in right side   s/p  rectal bleed.        *  admitted with   presumed  resp  failure/ pt was  sent back from [facility], the same day   pt seen in er.  appears   at her naseline.  no  wheezing /  atousable    ENT eval w/ laryngoscopy notable for vocal cords mobile and intact, no edema, upper airway patent     hypertonic saline for airway clearance   cxr, no pna     *   s/p cva, has  PEG,  npo  ,   on  synthroid, Keppra  ,  *   HTN, on  cardizem,  per  card dr jamison  *  h/o  Ca breast. /, s/p  R  mastectomy  *   obesity, bmi  was   41, now  is  30   *     c/c left  leg contracture ,  remains  bed  bound. functional  paraplegia,  needs  assistance  for  all her  adl's   *  pt  with  h/o  tracheomalacia  and  collapsed  airways,      given pt;s  mlple co morbidities,  pt is  at risk for  re admissions     on nocturnal  bipap    difficult  situation, a s there  is no  good  rx  for  pt's  recurrent lung collapse hypoxia    h/o  of  chronic    mild  tachycardia    re  admission  likely,, given her  airways, and  propensity  for  lung collapse      *   CT chest. lytic  lesions, T  spine/  ribs/ humerus/  oncoloigy  d r ohri.  suspect  metastatic ca breast        son. Sy, updated/ oncologist  has  also  discussed  with  son,  futility of  pursuing  bx. a s pt  is  not an ideal   candidate  for  chemo    CT  A.p ,/ prelim ,  pelvic  mets  per  oncology,   suspect  metastatic ca  breast    was  awaiting   mri  spine/  pt unable  to tolerate  mri   per oncology, no  further  intervention/  and on  Arimidex, now,   per d r ohri     no good  solution . given her mlple co morbidities   obesity,  bed bound.  functional paraplegia    per  son,   rehab saw pt  was a  bit anxious, and then promptly  returned   to er/ as they  did not  have  a  bipap machine /  care cortn to f/.p   on prednisolone  by pulm .  hypergycemia  from steroid   needs  24/7  O2/ nocturnal bipap      start   d/c, when cleared   by  pulm/  at present  not  cleraed  by pulm    difficult  situation,  bed  bound. obesity. c/c  hypercarbia, needing , prn /  nocturnal bipap      per  son, pt is  full code                    89    year old female    h/o hemorrhagic CVA, has  PEG,  HTN, MI,   HLD, gout, right ca  breast   right Ca breast. s/p mastectomy in 2019,  s/p  sentinel node  localization.  4/4,  were  negative  peg dislodged.  IR   replacements  on 1/3/22  s/p rrt  . in past,  for hypoxia. cxr with complete  collapsed  of  left lung, needing broch,   s/ p  bronchoscopy , pt  with  tracheomalacia  and  collapsed  airways,  makes  this a  difficult  situation, as there is no good rx for this     h/o bacteremia. on   pna, perforated  appendicitis,  ?  mets  to  acetabulum, was  seen by dr pacheco   surg/ IR  and  oncology eval dr pacheco   sa w pt.  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 this will not alter rx. pt  will need   extended  course  of ab   on iv  vanco and ertapenem.  till  2/9/.22  ID dr reagan, and per  surg  and  IR  eval,  no intervention   CT  1/20/22, no PE. collection in right side   s/p  rectal bleed.        *  admitted with   presumed  resp  failure/ pt was  sent back from [facility], the same day   pt seen in er.  appears   at her naseline.  no  wheezing /  atousable    ENT eval w/ laryngoscopy notable for vocal cords mobile and intact, no edema, upper airway patent     hypertonic saline for airway clearance   cxr, no pna     *   s/p cva, has  PEG,  npo  ,   on  synthroid, Keppra  ,  *   HTN, on  cardizem,  per  card dr jamison  *  h/o  Ca breast. /, s/p  R  mastectomy  *   obesity, bmi  was   41, now  is  30   *     c/c left  leg contracture ,  remains  bed  bound. functional  paraplegia,  needs  assistance  for  all her  adl's   *  pt  with  h/o  tracheomalacia  and  collapsed  airways,      given pt;s  mlple co morbidities,  pt is  at risk for  re admissions     on nocturnal  bipap    difficult  situation, a s there  is no  good  rx  for  pt's  recurrent lung collapse hypoxia    h/o  of  chronic    mild  tachycardia    re  admission  likely,, given her  airways, and  propensity  for  lung collapse      *   CT chest. lytic  lesions, T  spine/  ribs/ humerus/  oncoloigy  d r ohri.  suspect  metastatic ca breast        son. Sy, updated/ oncologist  has  also  discussed  with  son,  futility of  pursuing  bx. a s pt  is  not an ideal   candidate  for  chemo    CT  A.p ,/ prelim ,  pelvic  mets  per  oncology,   suspect  metastatic ca  breast    was  awaiting   mri  spine/  pt unable  to tolerate  mri   per oncology, no  further  intervention/  and on  Arimidex, now,   per d r ohri     no good  solution . given her mlple co morbidities   obesity,  bed bound.  functional paraplegia    per  son,   rehab saw pt  was a  bit anxious, and then promptly  returned   to er/ as they  did not  have  a  bipap machine /  care cortn to f/.p   on prednisolone  by pulm .  hypergycemia  from steroid   needs  24/7  O2/ nocturnal bipap      start   d/c, when cleared   by  pulm/  at present  not  cleraed  by pulm    difficult  situation,  bed  bound. obesity. c/c  hypercarbia, needing , prn /  nocturnal bipap/  with intermittent tachypnea      per  son, pt is  full code

## 2022-09-16 NOTE — DISCHARGE NOTE PROVIDER - NSDCFUSCHEDAPPT_GEN_ALL_CORE_FT
Fred Julio  [facility]  [address] [facility]  Scheduled Appointment: 10/04/2022

## 2022-09-16 NOTE — PROGRESS NOTE ADULT - SUBJECTIVE AND OBJECTIVE BOX
afebrile    REVIEW OF SYSTEMS:  GEN: no fever,    no chills  RESP: no SOB,   no cough  CVS: no chest pain,   no palpitations  GI: no abdominal pain,   no nausea,   no vomiting,   no constipation,   no diarrhea  : no dysuria,   no frequency  NEURO: no headache,   no dizziness  PSYCH: no depression,   not anxious  Derm : no rash    MEDICATIONS  (STANDING):  albuterol/ipratropium for Nebulization 3 milliLiter(s) Nebulizer every 6 hours  allopurinol 100 milliGRAM(s) Oral daily  anastrozole 1 milliGRAM(s) Oral daily  atorvastatin 20 milliGRAM(s) Oral at bedtime  chlorhexidine 2% Cloths 1 Application(s) Topical <User Schedule>  diltiazem    Tablet 30 milliGRAM(s) Oral three times a day  enoxaparin Injectable 40 milliGRAM(s) SubCutaneous every 24 hours  guaiFENesin Oral Liquid (Sugar-Free) 300 milliGRAM(s) Oral every 6 hours  insulin lispro (ADMELOG) corrective regimen sliding scale   SubCutaneous at bedtime  insulin lispro (ADMELOG) corrective regimen sliding scale   SubCutaneous three times a day before meals  levETIRAcetam  Solution 750 milliGRAM(s) Oral two times a day  levothyroxine 75 MICROGram(s) Oral daily  melatonin 3 milliGRAM(s) Oral at bedtime  prednisoLONE    3 mG/mL Solution (ORAPRED)   Oral   senna 2 Tablet(s) Oral at bedtime  sodium chloride 3%  Inhalation 4 milliLiter(s) Inhalation every 12 hours    MEDICATIONS  (PRN):  acetaminophen     Tablet .. 650 milliGRAM(s) Oral every 6 hours PRN Temp greater or equal to 38C (100.4F), Mild Pain (1 - 3)  ALPRAZolam 0.25 milliGRAM(s) Oral every 8 hours PRN severe anxiety  baclofen 5 milliGRAM(s) Oral every 12 hours PRN Muscle Spasm  bisacodyl Suppository 10 milliGRAM(s) Rectal daily PRN Constipation  polyethylene glycol 3350 17 Gram(s) Oral daily PRN Constipation  traMADol 50 milliGRAM(s) Oral two times a day PRN Severe Pain (7 - 10)      Vital Signs Last 24 Hrs  T(C): 37.1 (16 Sep 2022 04:24), Max: 37.1 (16 Sep 2022 04:24)  T(F): 98.8 (16 Sep 2022 04:24), Max: 98.8 (16 Sep 2022 04:24)  HR: 84 (16 Sep 2022 04:24) (84 - 97)  BP: 121/91 (16 Sep 2022 05:32) (100/73 - 138/76)  BP(mean): --  RR: 18 (16 Sep 2022 04:24) (18 - 18)  SpO2: 98% (16 Sep 2022 04:24) (97% - 100%)    Parameters below as of 16 Sep 2022 04:24  Patient On (Oxygen Delivery Method): nasal cannula      CAPILLARY BLOOD GLUCOSE      POCT Blood Glucose.: 108 mg/dL (16 Sep 2022 05:55)  POCT Blood Glucose.: 209 mg/dL (15 Sep 2022 23:58)  POCT Blood Glucose.: 187 mg/dL (15 Sep 2022 22:11)  POCT Blood Glucose.: 278 mg/dL (15 Sep 2022 16:47)  POCT Blood Glucose.: 314 mg/dL (15 Sep 2022 11:58)  POCT Blood Glucose.: 170 mg/dL (15 Sep 2022 08:03)    I&O's Summary    15 Sep 2022 07:01  -  16 Sep 2022 07:00  --------------------------------------------------------  IN: 0 mL / OUT: 1550 mL / NET: -1550 mL        PHYSICAL EXAM:  HEAD:  Atraumatic, Normocephalic  NECK: Supple, No   JVD  CHEST/LUNG:   no     rales,     no,    rhonchi  HEART: Regular rate and rhythm;         murmur  ABDOMEN: Soft, Nontender, ;   EXTREMITIES:    no    edema  NEUROLOGY:  rousable    LABS:                        8.1    9.55  )-----------( 298      ( 16 Sep 2022 05:38 )             27.0     09-16    136  |  99  |  33<H>  ----------------------------<  124<H>  4.5   |  29  |  0.59    Ca    9.2      16 Sep 2022 05:38    TPro  6.9  /  Alb  3.6  /  TBili  0.2  /  DBili  x   /  AST  22  /  ALT  11  /  AlkPhos  158<H>  09-15                09-16 @ 05:40  4.5  37              Consultant(s) Notes Reviewed:      Care Discussed with Consultants/Other Providers:

## 2022-09-17 NOTE — PROGRESS NOTE ADULT - SUBJECTIVE AND OBJECTIVE BOX
Follow-up Pulm Progress Note    The patient was seen and examined. Notes reviewed and discussed with staff/team as applicable      Found to be covid +. Now moved to isolation. No complaints. Seems comfortable.    Denies: increased SOB, Chest pain, increased cough, colored phlegm, hemoptysis, N/V/D, neck stiffness,      Vital Signs Last 24 Hrs  T(C): 36.6 (17 Sep 2022 05:20), Max: 37.2 (16 Sep 2022 13:50)  T(F): 97.8 (17 Sep 2022 05:20), Max: 98.9 (16 Sep 2022 13:50)  HR: 108 (17 Sep 2022 05:20) (88 - 108)  BP: 148/52 (17 Sep 2022 05:20) (115/66 - 148/52)  BP(mean): --  RR: 20 (17 Sep 2022 05:20) (20 - 20)  SpO2: 95% (17 Sep 2022 05:20) (95% - 99%)    Parameters below as of 17 Sep 2022 05:20  Patient On (Oxygen Delivery Method): nasal cannula  O2 Flow (L/min): 3            09-16 @ 07:01  -  09-17 @ 07:00  --------------------------------------------------------  IN: 940 mL / OUT: 300 mL / NET: 640 mL          Medications:  MEDICATIONS  (STANDING):  albuterol/ipratropium for Nebulization 3 milliLiter(s) Nebulizer every 6 hours  allopurinol 100 milliGRAM(s) Oral daily  anastrozole 1 milliGRAM(s) Oral daily  atorvastatin 20 milliGRAM(s) Oral at bedtime  bisacodyl 5 milliGRAM(s) Oral at bedtime  chlorhexidine 2% Cloths 1 Application(s) Topical <User Schedule>  diltiazem    Tablet 30 milliGRAM(s) Oral three times a day  insulin lispro (ADMELOG) corrective regimen sliding scale   SubCutaneous three times a day before meals  insulin lispro (ADMELOG) corrective regimen sliding scale   SubCutaneous at bedtime  levETIRAcetam  Solution 750 milliGRAM(s) Oral two times a day  levothyroxine 75 MICROGram(s) Oral daily  melatonin 3 milliGRAM(s) Oral at bedtime  methylPREDNISolone sodium succinate Injectable 20 milliGRAM(s) IV Push every 6 hours  remdesivir  IVPB 200 milliGRAM(s) IV Intermittent every 24 hours  remdesivir  IVPB   IV Intermittent   rivaroxaban 10 milliGRAM(s) Oral daily  senna 2 Tablet(s) Oral at bedtime  sodium chloride 3%  Inhalation 4 milliLiter(s) Inhalation every 12 hours    MEDICATIONS  (PRN):  acetaminophen     Tablet .. 650 milliGRAM(s) Oral every 6 hours PRN Temp greater or equal to 38C (100.4F), Mild Pain (1 - 3)  ALPRAZolam 0.25 milliGRAM(s) Oral every 8 hours PRN severe anxiety  polyethylene glycol 3350 17 Gram(s) Oral daily PRN Constipation  traMADol 50 milliGRAM(s) Oral two times a day PRN Severe Pain (7 - 10)      Allergies    Toradol (Urticaria (Mild to Mod); Rash (Mild to Mod))        Physical Examination:    Pleasant, calm, obese female, NAD on O2  Neck: no JVD, LAD,   PULM: Clear to auscultation bilaterally, no wheezes, rales, rhonchi.   CVS: Regular rate and rhythm, S1S2, no murmurs, rubs, or gallops  Abdomen: soft, NT  Extremities: large LEs, no edema  Neuro: L weakness, awake and alert.      LABS:                        8.1    9.55  )-----------( 298      ( 16 Sep 2022 05:38 )             27.0     09-16    136  |  99  |  33<H>  ----------------------------<  124<H>  4.5   |  29  |  0.59    Ca    9.2      16 Sep 2022 05:38    TPro  6.9  /  Alb  3.6  /  TBili  0.2  /  DBili  x   /  AST  22  /  ALT  11  /  AlkPhos  158<H>  09-15          CAPILLARY BLOOD GLUCOSE      POCT Blood Glucose.: 176 mg/dL (17 Sep 2022 06:45)

## 2022-09-17 NOTE — PROGRESS NOTE ADULT - ASSESSMENT
ASSESSMENT:     87 year old gentlewoman, lifelong non-smoker, well known to our group without history of intrinsic lung disease. 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 She also has a history of HTN, HLD, CAD s/p MI with preserved LVEF and moderate aortic stenosis.    She was admitted on 8/29 with shortness of breath in the setting of back, neck and left lower extremity pain. She required NIV for mild acute hypercapnic respiratory failure that quickly resolved. Her respiratory status remained stable on room air and on her usual nebs and mucolytics. She was started on a puree diet with moderately thickened fluids in addition to PEG feeds following evaluation by the speech and swallow team. She was started on anastrazole for likely metastatic breast cancer to the bone. On the day of discharge, the patient was quite anxious with shortness of breath, chest congestion and wheeze - she was making a grunting noise with expiration. She was returned from [facility] that evening. She is still subjectively dyspneic although VBG looks better. CT without large consolidation or large plug/atelectasis. No fevers, chills or sweats. No chest pain/pressure or palpitations.      she has severe tracheobronchomalacia that has resulted in mucous plugging with complete left lung collapse requiring several bronchoscopies for pulmonary toilet in the past - the patient was felt not to be a candidate for surgery for the tracheobronchomalacia and stenting was felt to have more risk than benefit     metastatic breast cancer    Nosocomial covid +    PLAN/RECOMMENDATIONS:      ID saw pt. Remdesivir started. On solumedrol. DVT prophylaxis.   Follow inflammatory markers.  oxygen supplementation to keep saturation greater than 92% - currently on a 3lpm nasal canula  VBG 9/16 - pH 7.37, pCO2 55 consistent with chronic compensated resp acidosis  chest CT -> patent central airways - subsegmental atelectasis is seen in the bilateral upper and lower lobes - no pleural effusion -> the patient does not need a bronchoscopy  solumedrol 20mg IV Q6. Taper as able.  albuterol and atrovent inhalers. Unable to use nebs.  guaifenesin 300mg 4 times daily via PEG  incentive spirometry   acapella device   Oncology has seen pt.    s/p right simple mastectomy 4/2019 for [id number] breast cancer but did not receive adjuvant hormonal therapy; per notes/signout, it is likely that the patient has metastatic breast cancer    started on anastrozole as the patient could not tolerate a MRI and is not a candidate for bone biopsy  treatment of HTN, HLD, CAD, aortic stenosis per cardiology  DVT prophylaxis - on xarelto    She would be a candidate for chronic NIV after discharge at her outpatient living situation      Liborio Dexter MD  Pulmonary Medicine  [phone]

## 2022-09-17 NOTE — PROGRESS NOTE ADULT - SUBJECTIVE AND OBJECTIVE BOX
afberile  REVIEW OF SYSTEMS:  GEN: no fever,    no chills  RESP: no SOB,   no cough  CVS: no chest pain,   no palpitations  GI: no abdominal pain,   no nausea,   no vomiting,   no constipation,   no diarrhea  : no dysuria,   no frequency  NEURO: no headache,   no dizziness  PSYCH: no depression,   not anxious  Derm : no rash    MEDICATIONS  (STANDING):  albuterol/ipratropium for Nebulization 3 milliLiter(s) Nebulizer every 6 hours  allopurinol 100 milliGRAM(s) Oral daily  anastrozole 1 milliGRAM(s) Oral daily  atorvastatin 20 milliGRAM(s) Oral at bedtime  chlorhexidine 2% Cloths 1 Application(s) Topical <User Schedule>  diltiazem    Tablet 30 milliGRAM(s) Oral three times a day  enoxaparin Injectable 40 milliGRAM(s) SubCutaneous every 24 hours  guaiFENesin Oral Liquid (Sugar-Free) 300 milliGRAM(s) Oral every 6 hours  insulin lispro (ADMELOG) corrective regimen sliding scale   SubCutaneous three times a day before meals  insulin lispro (ADMELOG) corrective regimen sliding scale   SubCutaneous at bedtime  levETIRAcetam  Solution 750 milliGRAM(s) Oral two times a day  levothyroxine 75 MICROGram(s) Oral daily  melatonin 3 milliGRAM(s) Oral at bedtime  methylPREDNISolone sodium succinate Injectable 20 milliGRAM(s) IV Push every 6 hours  senna 2 Tablet(s) Oral at bedtime  sodium chloride 3%  Inhalation 4 milliLiter(s) Inhalation every 12 hours    MEDICATIONS  (PRN):  acetaminophen     Tablet .. 650 milliGRAM(s) Oral every 6 hours PRN Temp greater or equal to 38C (100.4F), Mild Pain (1 - 3)  ALPRAZolam 0.25 milliGRAM(s) Oral every 8 hours PRN severe anxiety  baclofen 5 milliGRAM(s) Oral every 12 hours PRN Muscle Spasm  bisacodyl Suppository 10 milliGRAM(s) Rectal daily PRN Constipation  polyethylene glycol 3350 17 Gram(s) Oral daily PRN Constipation  traMADol 50 milliGRAM(s) Oral two times a day PRN Severe Pain (7 - 10)      Vital Signs Last 24 Hrs  T(C): 36.6 (17 Sep 2022 05:20), Max: 37.2 (16 Sep 2022 13:50)  T(F): 97.8 (17 Sep 2022 05:20), Max: 98.9 (16 Sep 2022 13:50)  HR: 108 (17 Sep 2022 05:20) (88 - 108)  BP: 148/52 (17 Sep 2022 05:20) (115/66 - 148/52)  BP(mean): --  RR: 20 (17 Sep 2022 05:20) (20 - 20)  SpO2: 95% (17 Sep 2022 05:20) (95% - 99%)    Parameters below as of 17 Sep 2022 05:20  Patient On (Oxygen Delivery Method): nasal cannula  O2 Flow (L/min): 3    CAPILLARY BLOOD GLUCOSE      POCT Blood Glucose.: 176 mg/dL (17 Sep 2022 06:45)  POCT Blood Glucose.: 265 mg/dL (17 Sep 2022 00:25)  POCT Blood Glucose.: 191 mg/dL (16 Sep 2022 17:28)  POCT Blood Glucose.: 254 mg/dL (16 Sep 2022 12:07)  POCT Blood Glucose.: 169 mg/dL (16 Sep 2022 07:50)    I&O's Summary    16 Sep 2022 07:01  -  17 Sep 2022 07:00  --------------------------------------------------------  IN: 940 mL / OUT: 0 mL / NET: 940 mL        PHYSICAL EXAM:  HEAD:  Atraumatic, Normocephalic  NECK: Supple, No   JVD  CHEST/LUNG:   no     rales,     no,   +  rhonchi  HEART: Regular rate and rhythm;         murmur  ABDOMEN: Soft, Nontender, ;   EXTREMITIES:    no    edema  NEUROLOGY:   slwwpy    LABS:                        8.1    9.55  )-----------( 298      ( 16 Sep 2022 05:38 )             27.0     09-16    136  |  99  |  33<H>  ----------------------------<  124<H>  4.5   |  29  |  0.59    Ca    9.2      16 Sep 2022 05:38    TPro  6.9  /  Alb  3.6  /  TBili  0.2  /  DBili  x   /  AST  22  /  ALT  11  /  AlkPhos  158<H>  09-15                09-16 @ 05:40  4.5  37              Consultant(s) Notes Reviewed:      Care Discussed with Consultants/Other Providers:

## 2022-09-17 NOTE — CHART NOTE - NSCHARTNOTEFT_GEN_A_CORE
Patient with elevated lactate - 3.7; repeat 3.5.  Patient with history of intrinsic lung disease and tracheobronchomalacia.  She is s/p BIPAP this admission.  Elevated lactate result d/w Dr. Dexter - at this time, requests ABG and blood cultures.  Endorsed to oncoming shift.    Chelsea Hatfield NP  [phone]

## 2022-09-17 NOTE — CONSULT NOTE ADULT - ASSESSMENT
Mr. 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  Pt was admitted for management of Acute on Chronic respiratory failure 2/2 severe tracheomalacia and bronchospasm. Pt was managed with NIV and steroids. Pt was tested for DC planning yesterday and found to be COVID positive and ID consulted for the same.    WORKUP  COVID-19 PCR: Detected (09-16-22 @ 18:18)  COVID-19 PCR: NotDetec (09-13-22 @ 08:24)    DIAGNOSIS and IMPRESSION  ·	Nosocomial COVID 19  ·	Acute on Chronic respiratory failure 2/2 severe tracheomalacia and bronchospasm.  ·	metastatic breast cancer to bones s/p mastectomy (pt was unable to tolerate further cancer workup of spine lesions).     Pt started on remdesevir (9/17)  Pt with stable O2 requirements 3 L during day and BiPAP at night  Pt was transitioned from her prednisone taper to solumedrol 20 IV Q6 by pulm.    RECOMMENDATIONS        PT TO BE SEEN. PRELIM NOTE  PENDING RECS. PLEASE WAIT FOR FINAL RECS AFTER DISCUSSION WITH ATTENDINGRito Gutierrez MD, PGY5  ID fellow  [employer] Teams Preferred  After 5pm/weekends call [phone]    Mr. 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  Pt was admitted for management of Acute on Chronic respiratory failure 2/2 severe tracheomalacia and bronchospasm. Pt was managed with NIV and steroids. Pt was tested for DC planning yesterday and found to be COVID positive and ID consulted for the same.    WORKUP  COVID-19 PCR: Detected (09-16-22 @ 18:18)  COVID-19 PCR: NotDetec (09-13-22 @ 08:24)    DIAGNOSIS and IMPRESSION  ·	Nosocomial COVID 19  ·	Acute on Chronic respiratory failure 2/2 severe tracheomalacia and bronchospasm.  ·	metastatic breast cancer to bones s/p mastectomy (pt was unable to tolerate further cancer workup of spine lesions).     Pt started on remdesevir (9/17)  Pt with stable O2 requirements 3 L during day and BiPAP at night  Pt was transitioned from her prednisone taper to solumedrol 20 IV Q6 by pulm.    RECOMMENDATIONS  Unclear if pt has new covid symptoms in regards to SOB given complicated pulmonary issues -> Recommend short course of remdesevir with close monitoring  Recommend following Labs Q48hrs - ESR; CRP; Procalcitonin; Ferritin; LDH; d-dimer  Continue supplemental O2 and supportive care per primary team  Continue Contact and Airborne Isolation precautions    Pt seen and examined. Case d/w attending and primary team    Jero Gutierrez MD, PGY5  ID fellow  [employer] Teams Preferred  After 5pm/weekends call [phone]

## 2022-09-17 NOTE — CONSULT NOTE ADULT - SUBJECTIVE AND OBJECTIVE BOX
Patient is a 87y old  Female who presents with a chief complaint of SOB (17 Sep 2022 07:01)    HPI: Mr. 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   Of note, pt has multiple recent admissions with similar presentation. Most recently was hospitalized from 8/28/22-9/8/22 for SOB, treated with airway clearance and completed 5-day course of Zosyn for empiric coverage of PNA (though per Pulm, unlikely to have PNA). Course c/b PEG displacement and subsequent reinsertion by IR on 8/30. In addition to her usual PEG feeds, pt was also started on puree diet with moderately thickened fluids for pleasure feeds. Also was started on anastrozole for most likely dx of metastatic breast cancer to bones (pt was unable to tolerate further cancer workup of spine lesions).   Pt was admitted for management of Acute on Chronic respiratory failure 2/2 severe tracheomalacia and bronchospasm. Pt was managed with NIV and steroids. Pt was tested for DC planning yesterday and found to be COVID positive. Pt was transitioned from her prednisone taper to solumedrol 20 IV Q6 by pulm. Pt was started on remdesevir and ID consulted for the same.       REVIEW OF SYSTEMS  [  ] ROS unobtainable because:    [ x ] All other systems negative except as noted below    Constitutional:  [ ] fever [ ] chills  [ ] weight loss  [ ]night sweat  [ ]poor appetite/PO intake [ ]fatigue   Skin:  [ ] rash [ ] phlebitis	  Eyes: [ ] icterus [ ] pain  [ ] discharge	  ENMT: [ ] sore throat  [ ] thrush [ ] ulcers [ ] exudates [ ]anosmia  Respiratory: [ ] dyspnea [ ] hemoptysis [ ] cough [ ] sputum	  Cardiovascular:  [ ] chest pain [ ] palpitations [ ] edema	  Gastrointestinal:  [ ] nausea [ ] vomiting [ ] diarrhea [ ] constipation [ ] pain	  Genitourinary:  [ ] dysuria [ ] frequency [ ] hematuria [ ] discharge [ ] flank pain  [ ] incontinence  Musculoskeletal:  [ ] myalgias [ ] arthralgias [ ] arthritis  [ ] back pain  Neurological:  [ ] headache [ ] weakness [ ] seizures  [ ] confusion/altered mental status    prior hospital charts reviewed [V]  primary team notes reviewed [V]  other consultant notes reviewed [V]    PAST MEDICAL & SURGICAL HISTORY:  MI (myocardial infarction)  Personal history of asbestosis  Gout  UTI (lower urinary tract infection)  ETOH abuse  CVA (cerebral vascular accident)  Tracheobronchomalacia  HTN (hypertension)  HLD (hyperlipidemia)  History of left shoulder fracture  History of benign breast tumor    SOCIAL HISTORY:  - Denied smoking/vaping/alcohol/recreational drug use    FAMILY HISTORY:  No pertinent family history in first degree relatives        Allergies  Toradol (Urticaria (Mild to Mod); Rash (Mild to Mod))        ANTIMICROBIALS:  remdesivir  IVPB 200 every 24 hours  remdesivir  IVPB        ANTIMICROBIALS (past 90 days):  MEDICATIONS  (STANDING):  cefepime   IVPB   100 mL/Hr IV Intermittent (09-08-22 @ 22:47)    vancomycin  IVPB.   250 mL/Hr IV Intermittent (09-08-22 @ 23:38)        OTHER MEDS:   MEDICATIONS  (STANDING):  acetaminophen     Tablet .. 650 every 6 hours PRN  albuterol/ipratropium for Nebulization 3 every 6 hours  allopurinol 100 daily  ALPRAZolam 0.25 every 8 hours PRN  anastrozole 1 daily  atorvastatin 20 at bedtime  bisacodyl 5 at bedtime  diltiazem    Tablet 30 three times a day  insulin lispro (ADMELOG) corrective regimen sliding scale  at bedtime  insulin lispro (ADMELOG) corrective regimen sliding scale  three times a day before meals  levETIRAcetam  Solution 750 two times a day  levothyroxine 75 daily  melatonin 3 at bedtime  methylPREDNISolone sodium succinate Injectable 20 every 6 hours  polyethylene glycol 3350 17 daily PRN  rivaroxaban 10 daily  senna 2 at bedtime  sodium chloride 3%  Inhalation 4 every 12 hours  traMADol 50 two times a day PRN      VITALS:  Vital Signs Last 24 Hrs  T(F): 97.8 (09-17-22 @ 05:20), Max: 99 (09-11-22 @ 21:21)    Vital Signs Last 24 Hrs  HR: 108 (09-17-22 @ 05:20) (88 - 108)  BP: 148/52 (09-17-22 @ 05:20) (115/66 - 148/52)  RR: 20 (09-17-22 @ 05:20)  SpO2: 95% (09-17-22 @ 05:20) (95% - 99%)  Wt(kg): --    EXAM:    GA: NAD, AOx3  HEENT: oral cavity no lesion  CV: nl S1/S2, no RMG  Lungs: CTAB, No distress  Abd: BS+, soft, nontender, no rebounding pain  Ext: no edema  Neuro: No focal deficits  Skin: Intact  IV: no phlebitis    Labs:                        8.1    9.55  )-----------( 298      ( 16 Sep 2022 05:38 )             27.0     09-16    136  |  99  |  33<H>  ----------------------------<  124<H>  4.5   |  29  |  0.59    Ca    9.2      16 Sep 2022 05:38    TPro  6.9  /  Alb  3.6  /  TBili  0.2  /  DBili  x   /  AST  22  /  ALT  11  /  AlkPhos  158<H>  09-15      WBC Trend:  WBC Count: 9.55 (09-16-22 @ 05:38)  WBC Count: 9.07 (09-15-22 @ 10:59)  WBC Count: 5.91 (09-14-22 @ 11:44)  WBC Count: 4.87 (09-13-22 @ 15:31)      Auto Neutrophil #: 6.95 K/uL (09-09-22 @ 05:58)  Auto Neutrophil #: 8.13 K/uL (09-08-22 @ 20:56)  Auto Neutrophil #: 12.30 K/uL (08-28-22 @ 05:23)  Auto Neutrophil #: 13.18 K/uL (08-27-22 @ 21:06)  Auto Neutrophil #: 3.88 K/uL (03-16-22 @ 19:57)      Creatine Trend:  Creatinine, Serum: 0.59 (09-16)  Creatinine, Serum: 0.59 (09-15)  Creatinine, Serum: 0.68 (09-14)  Creatinine, Serum: 0.50 (09-13)      Liver Biochemical Testing Trend:  Alanine Aminotransferase (ALT/SGPT): 11 (09-15)  Alanine Aminotransferase (ALT/SGPT): 13 (09-09)  Alanine Aminotransferase (ALT/SGPT): 13 (09-08)  Alanine Aminotransferase (ALT/SGPT): 14 (09-01)  Alanine Aminotransferase (ALT/SGPT): 16 (08-28)  Aspartate Aminotransferase (AST/SGOT): 22 (09-15-22 @ 10:59)  Aspartate Aminotransferase (AST/SGOT): 14 (09-09-22 @ 05:58)  Aspartate Aminotransferase (AST/SGOT): 18 (09-08-22 @ 20:56)  Aspartate Aminotransferase (AST/SGOT): 18 (09-01-22 @ 09:16)  Aspartate Aminotransferase (AST/SGOT): 37 (08-28-22 @ 05:23)  Bilirubin Total, Serum: 0.2 (09-15)  Bilirubin Total, Serum: 0.4 (09-09)  Bilirubin Total, Serum: 0.4 (09-08)  Bilirubin Total, Serum: 0.2 (09-01)  Bilirubin Total, Serum: 0.2 (08-28)      Trend LDH              MICROBIOLOGY:    MRSA PCR Result.: NotDetec (09-12-22 @ 14:34)  MRSA PCR Result.: NotDetec (08-28-22 @ 05:23)      Culture - Blood (collected 08 Sep 2022 20:39)  Source: .Blood Blood-Peripheral  Final Report:    No Growth Final    Culture - Blood (collected 08 Sep 2022 20:25)  Source: .Blood Blood-Peripheral  Final Report:    No Growth Final    Culture - Urine (collected 27 Aug 2022 22:42)  Source: Clean Catch Clean Catch (Midstream)  Final Report:    >100,000 CFU/ml Enterococcus faecium    <10,000 CFU/ml Normal Urogenital regina present  Organism: Enterococcus faecium  Organism: Enterococcus faecium    Sensitivities:      -  Ampicillin: R >8 Predicts results to ampicillin/sulbactam, amoxacillin-clavulanate and  piperacillin-tazobactam.      -  Ciprofloxacin: R >2      -  Levofloxacin: R >4      -  Nitrofurantoin: I 64 Should not be used to treat pyelonephritis.      -  Tetra/Doxy: R >8      -  Vancomycin: S 1      Method Type: TAWANA    Culture - Blood (collected 27 Aug 2022 20:30)  Source: .Blood Blood-Peripheral  Final Report:    No Growth Final    Culture - Blood (collected 27 Aug 2022 20:15)  Source: .Blood Blood-Peripheral  Final Report:    No Growth Final    Culture - Urine (collected 16 Mar 2022 22:42)  Source: Clean Catch Clean Catch (Midstream)  Final Report:    <10,000 CFU/mL Normal Urogenital Regina    Culture - Blood (collected 07 Feb 2022 10:12)  Source: .Blood Blood-Peripheral  Final Report:    No Growth Final    Culture - Blood (collected 07 Feb 2022 10:12)  Source: .Blood Blood-Peripheral  Final Report:    No Growth Final    Culture - Urine (collected 20 Jan 2022 16:37)  Source: Clean Catch Clean Catch (Midstream)  Final Report:    >100,000 CFU/ml Enterococcus faecium (vancomycin resistant)    <10,000 CFU/ml Normal Urogenital regina present  Organism: Enterococcus faecium (vancomycin resistant)  Organism: Enterococcus faecium (vancomycin resistant)    Sensitivities:      -  Ampicillin: R >8 Predicts results to ampicillin/sulbactam, amoxacillin-clavulanate and  piperacillin-tazobactam.      -  Ciprofloxacin: R >2      -  Daptomycin: SDD 4 The breakpoint for SDD (sensitive dose dependent)is based on a dosage regimen of 8-12 mg/kg administered every 24 h in adults and is intended for serious infections due to E. faecium. Consultation with an infectious diseases specialist is recommended.      -  Levofloxacin: R >4      -  Linezolid: S 2      -  Nitrofurantoin: R >64 Should not be used to treat pyelonephritis.      -  Penicillin: R >8      -  Rifampin: R >2      -  Tetra/Doxy: S <=1      -  Vancomycin: R >16      Method Type: TAWANA    Culture - Blood (collected 20 Jan 2022 12:21)  Source: .Blood Blood-Peripheral  Final Report:    No Growth Final    COVID-19 PCR: Detected (09-16-22 @ 18:18)  COVID-19 PCR: NotDetec (09-13-22 @ 08:24)  COVID-19 PCR: NotDetec (09-07-22 @ 12:48)  COVID-19 PCR: NotDetec (09-03-22 @ 17:58)    Ferritin, Serum: 67 (09-11)  Blood Gas Venous - Lactate: 1.4 (09-16 @ 05:40)    RADIOLOGY:  imaging below personally reviewed    < from: CT Chest No Cont (09.14.22 @ 12:32) >  Subsegmental atelectasis in the bilateral upper and lower lobes.   Lytic bone lesions as described above, unchanged from prior CT chest  8/29/2022. Recommend MR thoracic spine for further evaluation.   < end of copied text >    < from: Xray Chest 1 View- PORTABLE-Urgent (Xray Chest 1 View- PORTABLE-Urgent .) (09.12.22 @ 13:39) >  Minimally increased left mid to lower lung atelectasis.   < end of copied text >       Patient is a 87y old  Female who presents with a chief complaint of SOB (17 Sep 2022 07:01)    HPI: Mr. 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   Of note, pt has multiple recent admissions with similar presentation. Most recently was hospitalized from 8/28/22-9/8/22 for SOB, treated with airway clearance and completed 5-day course of Zosyn for empiric coverage of PNA (though per Pulm, unlikely to have PNA). Course c/b PEG displacement and subsequent reinsertion by IR on 8/30. In addition to her usual PEG feeds, pt was also started on puree diet with moderately thickened fluids for pleasure feeds. Also was started on anastrozole for most likely dx of metastatic breast cancer to bones (pt was unable to tolerate further cancer workup of spine lesions).   Pt was admitted for management of Acute on Chronic respiratory failure 2/2 severe tracheomalacia and bronchospasm. Pt was managed with NIV and steroids. Pt was tested for DC planning yesterday and found to be COVID positive. Pt was transitioned from her prednisone taper to solumedrol 20 IV Q6 by pulm. Pt was started on remdesevir and ID consulted for the same. Pt very short of breath, w/o increase in O2 requirements. Pt also noted diarrhea this am       REVIEW OF SYSTEMS  [  ] ROS unobtainable because:    [ x ] All other systems negative except as noted below    Constitutional:  [ ] fever [ ] chills  [ ] weight loss  [ ]night sweat  [ ]poor appetite/PO intake [ ]fatigue   Skin:  [ ] rash [ ] phlebitis	  Eyes: [ ] icterus [ ] pain  [ ] discharge	  ENMT: [ ] sore throat  [ ] thrush [ ] ulcers [ ] exudates [ ]anosmia  Respiratory: [ ] dyspnea [ ] hemoptysis [ ] cough [ ] sputum	  Cardiovascular:  [ ] chest pain [ ] palpitations [ ] edema	  Gastrointestinal:  [ ] nausea [ ] vomiting [ ] diarrhea [ ] constipation [ ] pain	  Genitourinary:  [ ] dysuria [ ] frequency [ ] hematuria [ ] discharge [ ] flank pain  [ ] incontinence  Musculoskeletal:  [ ] myalgias [ ] arthralgias [ ] arthritis  [ ] back pain  Neurological:  [ ] headache [ ] weakness [ ] seizures  [ ] confusion/altered mental status    prior hospital charts reviewed [V]  primary team notes reviewed [V]  other consultant notes reviewed [V]    PAST MEDICAL & SURGICAL HISTORY:  MI (myocardial infarction)  Personal history of asbestosis  Gout  UTI (lower urinary tract infection)  ETOH abuse  CVA (cerebral vascular accident)  Tracheobronchomalacia  HTN (hypertension)  HLD (hyperlipidemia)  History of left shoulder fracture  History of benign breast tumor    SOCIAL HISTORY:  - Denied smoking/vaping/alcohol/recreational drug use    FAMILY HISTORY:  No pertinent family history in first degree relatives        Allergies  Toradol (Urticaria (Mild to Mod); Rash (Mild to Mod))        ANTIMICROBIALS:  remdesivir  IVPB 200 every 24 hours  remdesivir  IVPB        ANTIMICROBIALS (past 90 days):  MEDICATIONS  (STANDING):  cefepime   IVPB   100 mL/Hr IV Intermittent (09-08-22 @ 22:47)    vancomycin  IVPB.   250 mL/Hr IV Intermittent (09-08-22 @ 23:38)        OTHER MEDS:   MEDICATIONS  (STANDING):  acetaminophen     Tablet .. 650 every 6 hours PRN  albuterol/ipratropium for Nebulization 3 every 6 hours  allopurinol 100 daily  ALPRAZolam 0.25 every 8 hours PRN  anastrozole 1 daily  atorvastatin 20 at bedtime  bisacodyl 5 at bedtime  diltiazem    Tablet 30 three times a day  insulin lispro (ADMELOG) corrective regimen sliding scale  at bedtime  insulin lispro (ADMELOG) corrective regimen sliding scale  three times a day before meals  levETIRAcetam  Solution 750 two times a day  levothyroxine 75 daily  melatonin 3 at bedtime  methylPREDNISolone sodium succinate Injectable 20 every 6 hours  polyethylene glycol 3350 17 daily PRN  rivaroxaban 10 daily  senna 2 at bedtime  sodium chloride 3%  Inhalation 4 every 12 hours  traMADol 50 two times a day PRN      VITALS:  Vital Signs Last 24 Hrs  T(F): 97.8 (09-17-22 @ 05:20), Max: 99 (09-11-22 @ 21:21)    Vital Signs Last 24 Hrs  HR: 108 (09-17-22 @ 05:20) (88 - 108)  BP: 148/52 (09-17-22 @ 05:20) (115/66 - 148/52)  RR: 20 (09-17-22 @ 05:20)  SpO2: 95% (09-17-22 @ 05:20) (95% - 99%)  Wt(kg): --    EXAM:    Constitutional: Not in acute distress  Eyes: pupils bilaterally reactive to light. No icterus.  Oral cavity: Clear, no lesions  Neck: No neck vein distension noted  RS: bilaterally crepitations. Inspiratory stridor  CVS: S1, S2 heard. Regular rate and rhythm. No murmurs/rubs/gallops.  Abdomen: Soft. No guarding/rigidity/tenderness.  : No acute abnormalities  Extremities: Warm. No pedal edema  Skin: No lesions noted  Vascular: No evidence of phlebitis  Neuro: Alert, oriented to time/place/person  Cranial nerves 2-12 grossly normal. No focal abnormalities      Labs:                        8.1    9.55  )-----------( 298      ( 16 Sep 2022 05:38 )             27.0     09-16    136  |  99  |  33<H>  ----------------------------<  124<H>  4.5   |  29  |  0.59    Ca    9.2      16 Sep 2022 05:38    TPro  6.9  /  Alb  3.6  /  TBili  0.2  /  DBili  x   /  AST  22  /  ALT  11  /  AlkPhos  158<H>  09-15      WBC Trend:  WBC Count: 9.55 (09-16-22 @ 05:38)  WBC Count: 9.07 (09-15-22 @ 10:59)  WBC Count: 5.91 (09-14-22 @ 11:44)  WBC Count: 4.87 (09-13-22 @ 15:31)      Auto Neutrophil #: 6.95 K/uL (09-09-22 @ 05:58)  Auto Neutrophil #: 8.13 K/uL (09-08-22 @ 20:56)  Auto Neutrophil #: 12.30 K/uL (08-28-22 @ 05:23)  Auto Neutrophil #: 13.18 K/uL (08-27-22 @ 21:06)  Auto Neutrophil #: 3.88 K/uL (03-16-22 @ 19:57)      Creatine Trend:  Creatinine, Serum: 0.59 (09-16)  Creatinine, Serum: 0.59 (09-15)  Creatinine, Serum: 0.68 (09-14)  Creatinine, Serum: 0.50 (09-13)      Liver Biochemical Testing Trend:  Alanine Aminotransferase (ALT/SGPT): 11 (09-15)  Alanine Aminotransferase (ALT/SGPT): 13 (09-09)  Alanine Aminotransferase (ALT/SGPT): 13 (09-08)  Alanine Aminotransferase (ALT/SGPT): 14 (09-01)  Alanine Aminotransferase (ALT/SGPT): 16 (08-28)  Aspartate Aminotransferase (AST/SGOT): 22 (09-15-22 @ 10:59)  Aspartate Aminotransferase (AST/SGOT): 14 (09-09-22 @ 05:58)  Aspartate Aminotransferase (AST/SGOT): 18 (09-08-22 @ 20:56)  Aspartate Aminotransferase (AST/SGOT): 18 (09-01-22 @ 09:16)  Aspartate Aminotransferase (AST/SGOT): 37 (08-28-22 @ 05:23)  Bilirubin Total, Serum: 0.2 (09-15)  Bilirubin Total, Serum: 0.4 (09-09)  Bilirubin Total, Serum: 0.4 (09-08)  Bilirubin Total, Serum: 0.2 (09-01)  Bilirubin Total, Serum: 0.2 (08-28)      Trend LDH              MICROBIOLOGY:    MRSA PCR Result.: NotDetec (09-12-22 @ 14:34)  MRSA PCR Result.: NotDetec (08-28-22 @ 05:23)      Culture - Blood (collected 08 Sep 2022 20:39)  Source: .Blood Blood-Peripheral  Final Report:    No Growth Final    Culture - Blood (collected 08 Sep 2022 20:25)  Source: .Blood Blood-Peripheral  Final Report:    No Growth Final    Culture - Urine (collected 27 Aug 2022 22:42)  Source: Clean Catch Clean Catch (Midstream)  Final Report:    >100,000 CFU/ml Enterococcus faecium    <10,000 CFU/ml Normal Urogenital regina present  Organism: Enterococcus faecium  Organism: Enterococcus faecium    Sensitivities:      -  Ampicillin: R >8 Predicts results to ampicillin/sulbactam, amoxacillin-clavulanate and  piperacillin-tazobactam.      -  Ciprofloxacin: R >2      -  Levofloxacin: R >4      -  Nitrofurantoin: I 64 Should not be used to treat pyelonephritis.      -  Tetra/Doxy: R >8      -  Vancomycin: S 1      Method Type: TAWANA    Culture - Blood (collected 27 Aug 2022 20:30)  Source: .Blood Blood-Peripheral  Final Report:    No Growth Final    Culture - Blood (collected 27 Aug 2022 20:15)  Source: .Blood Blood-Peripheral  Final Report:    No Growth Final    Culture - Urine (collected 16 Mar 2022 22:42)  Source: Clean Catch Clean Catch (Midstream)  Final Report:    <10,000 CFU/mL Normal Urogenital Regina    Culture - Blood (collected 07 Feb 2022 10:12)  Source: .Blood Blood-Peripheral  Final Report:    No Growth Final    Culture - Blood (collected 07 Feb 2022 10:12)  Source: .Blood Blood-Peripheral  Final Report:    No Growth Final    Culture - Urine (collected 20 Jan 2022 16:37)  Source: Clean Catch Clean Catch (Midstream)  Final Report:    >100,000 CFU/ml Enterococcus faecium (vancomycin resistant)    <10,000 CFU/ml Normal Urogenital regina present  Organism: Enterococcus faecium (vancomycin resistant)  Organism: Enterococcus faecium (vancomycin resistant)    Sensitivities:      -  Ampicillin: R >8 Predicts results to ampicillin/sulbactam, amoxacillin-clavulanate and  piperacillin-tazobactam.      -  Ciprofloxacin: R >2      -  Daptomycin: SDD 4 The breakpoint for SDD (sensitive dose dependent)is based on a dosage regimen of 8-12 mg/kg administered every 24 h in adults and is intended for serious infections due to E. faecium. Consultation with an infectious diseases specialist is recommended.      -  Levofloxacin: R >4      -  Linezolid: S 2      -  Nitrofurantoin: R >64 Should not be used to treat pyelonephritis.      -  Penicillin: R >8      -  Rifampin: R >2      -  Tetra/Doxy: S <=1      -  Vancomycin: R >16      Method Type: TAWANA    Culture - Blood (collected 20 Jan 2022 12:21)  Source: .Blood Blood-Peripheral  Final Report:    No Growth Final    COVID-19 PCR: Detected (09-16-22 @ 18:18)  COVID-19 PCR: NotDetec (09-13-22 @ 08:24)  COVID-19 PCR: NotDetec (09-07-22 @ 12:48)  COVID-19 PCR: NotDetec (09-03-22 @ 17:58)    Ferritin, Serum: 67 (09-11)  Blood Gas Venous - Lactate: 1.4 (09-16 @ 05:40)    RADIOLOGY:  imaging below personally reviewed    < from: CT Chest No Cont (09.14.22 @ 12:32) >  Subsegmental atelectasis in the bilateral upper and lower lobes.   Lytic bone lesions as described above, unchanged from prior CT chest  8/29/2022. Recommend MR thoracic spine for further evaluation.   < end of copied text >    < from: Xray Chest 1 View- PORTABLE-Urgent (Xray Chest 1 View- PORTABLE-Urgent .) (09.12.22 @ 13:39) >  Minimally increased left mid to lower lung atelectasis.   < end of copied text >

## 2022-09-17 NOTE — CHART NOTE - NSCHARTNOTEFT_GEN_A_CORE
Patient tested  positive for COVID SARS yesterday night. Repeat COVID PCR sent   Patient ordered for isolation precautions.   Patient on 3LNC Sat 99% she was started on solumedrol 20mg q 6 hrs, saline nebs.  Will endorse to day team and follow up with PMD to see if Remdesivir will be started.    Viet MARTINEZ  Dept of Internal Medicine  Ext [phone]

## 2022-09-17 NOTE — PROGRESS NOTE ADULT - ASSESSMENT
89    year old female    h/o hemorrhagic CVA, has  PEG,  HTN, MI,   HLD, gout, right ca  breast   right Ca breast. s/p mastectomy in 2019,  s/p  sentinel node  localization.  4/4,  were  negative  peg dislodged.  IR   replacements  on 1/3/22  s/p rrt  . in past,  for hypoxia. cxr with complete  collapsed  of  left lung, needing broch,   s/ p  bronchoscopy , pt  with  tracheomalacia  and  collapsed  airways,  makes  this a  difficult  situation, as there is no good rx for this     h/o bacteremia. on   pna, perforated  appendicitis,  ?  mets  to  acetabulum, was  seen by dr pacheco   surg/ IR  and  oncology eval dr pacheco   sa w pt.  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 this will not alter rx. pt  will need   extended  course  of ab   on iv  vanco and ertapenem.  till  2/9/.22  ID dr reagan, and per  surg  and  IR  eval,  no intervention   CT  1/20/22, no PE. collection in right side   s/p  rectal bleed.        *  admitted with   presumed  resp  failure/ pt [place]  [place] [facility]   pt seen in er.  appears   at her naseline.  no  wheezing /  atousable    ENT eval w/ laryngoscopy notable for vocal cords mobile and intact, no edema, upper airway patent     hypertonic saline for airway clearance   cxr, no pna     *   s/p cva, has  PEG,  npo  ,   on  synthroid, Keppra  ,  *   HTN, on  cardizem,  per  card dr jamison  *  h/o  Ca breast. /, s/p  R  mastectomy  *   obesity, bmi  was   41, now  is  30   *     c/c left  leg contracture ,  remains  bed  bound. functional  paraplegia,  needs  assistance  for  all her  adl's   *  pt  with  h/o  tracheomalacia  and  collapsed  airways,      given pt;s  mlple co morbidities,  pt is  at risk for  re admissions     on nocturnal  bipap    difficult  situation, a s there  is no  good  rx  for  pt's  recurrent lung collapse hypoxia    h/o  of  chronic    mild  tachycardia    re  admission  likely,, given her  airways, and  propensity  for  lung collapse      *   CT chest. lytic  lesions, T  spine/  ribs/ humerus/  oncoloigy  d r ohri.  suspect  metastatic ca breast        son. Sy guicho/ oncologist  has  also  discussed  with  son,  futility of  pursuing  bx. a s pt  is  not an ideal   candidate  for  chemo    CT  A.p ,/ prelim ,  pelvic  mets  per  oncology,   suspect  metastatic ca  breast    was  awaiting   mri  spine/  pt unable  to tolerate  mri   per oncology, no  further  intervention/  and on  Arimidex, now,   per d r ohri     no good  solution . given her mlple co morbidities   obesity,  bed bound.  functional paraplegia    per  son,   rehab saw pt  was a  bit anxious, and then promptly  returned   to er/ as they  did not  have  a  bipap machine /  care cortn to f/.p   on prednisolone  by pulm .  hypergycemia  from steroid   needs  24/7  O2/ nocturnal bipap   difficult  situation,  bed  bound. obesity. c/c  hypercarbia, needing , prn /  nocturnal bipap/  with intermittent tachypnea    now  is  covid +. on iv steroid/ wheezing/ remdissivir/  hous e ID  eval    dvt ppx/  xarelto  for   30  days      per  son, pt is  full code

## 2022-09-17 NOTE — PROGRESS NOTE ADULT - SUBJECTIVE AND OBJECTIVE BOX
CARDIOLOGY     PROGRESS  NOTE   ________________________________________________    CHIEF COMPLAINT:Patient is a 87y old  Female who presents with a chief complaint of SOB (17 Sep 2022 10:12)  no complain.  	  REVIEW OF SYSTEMS:  CONSTITUTIONAL: No fever, weight loss, or fatigue  EYES: No eye pain, visual disturbances, or discharge  ENT:  No difficulty hearing, tinnitus, vertigo; No sinus or throat pain  NECK: No pain or stiffness  RESPIRATORY: No cough, wheezing, chills or hemoptysis; + Shortness of Breath  CARDIOVASCULAR: No chest pain, palpitations, passing out, dizziness, or leg swelling  GASTROINTESTINAL: No abdominal or epigastric pain. No nausea, vomiting, or hematemesis; No diarrhea or constipation. No melena or hematochezia.  GENITOURINARY: No dysuria, frequency, hematuria, or incontinence  NEUROLOGICAL: No headaches, memory loss, loss of strength, numbness, or tremors  SKIN: No itching, burning, rashes, or lesions   LYMPH Nodes: No enlarged glands  ENDOCRINE: No heat or cold intolerance; No hair loss  MUSCULOSKELETAL: No joint pain or swelling; No muscle, back, or extremity pain  PSYCHIATRIC: No depression, anxiety, mood swings, or difficulty sleeping  HEME/LYMPH: No easy bruising, or bleeding gums  ALLERGY AND IMMUNOLOGIC: No hives or eczema	    [ ] All others negative	  [ ] Unable to obtain    PHYSICAL EXAM:  T(C): 36.6 (09-17-22 @ 05:20), Max: 37.2 (09-16-22 @ 13:50)  HR: 108 (09-17-22 @ 05:20) (88 - 108)  BP: 148/52 (09-17-22 @ 05:20) (115/66 - 148/52)  RR: 20 (09-17-22 @ 05:20) (20 - 20)  SpO2: 95% (09-17-22 @ 05:20) (95% - 99%)  Wt(kg): --  I&O's Summary    16 Sep 2022 07:01  -  17 Sep 2022 07:00  --------------------------------------------------------  IN: 940 mL / OUT: 300 mL / NET: 640 mL        Appearance: Normal	  HEENT:   Normal oral mucosa, PERRL, EOMI	  Lymphatic: No lymphadenopathy  Cardiovascular: Normal S1 S2, No JVD, + murmurs, No edema  Respiratory: rhonchi  Psychiatry: A & O x 3, Mood & affect appropriate  Gastrointestinal:  Soft, Non-tender, + BS	  Skin: No rashes, No ecchymoses, No cyanosis	  Neurologic: Non-focal  Extremities: Normal range of motion, No clubbing, cyanosis or edema  Vascular: Peripheral pulses palpable 2+ bilaterally    MEDICATIONS  (STANDING):  ALBUTerol    90 MICROgram(s) HFA Inhaler 2 Puff(s) Inhalation four times a day  allopurinol 100 milliGRAM(s) Oral daily  anastrozole 1 milliGRAM(s) Oral daily  atorvastatin 20 milliGRAM(s) Oral at bedtime  bisacodyl 5 milliGRAM(s) Oral at bedtime  chlorhexidine 2% Cloths 1 Application(s) Topical <User Schedule>  diltiazem    Tablet 30 milliGRAM(s) Oral three times a day  insulin lispro (ADMELOG) corrective regimen sliding scale   SubCutaneous at bedtime  insulin lispro (ADMELOG) corrective regimen sliding scale   SubCutaneous three times a day before meals  ipratropium 17 MICROgram(s) HFA Inhaler 2 Puff(s) Inhalation four times a day  levETIRAcetam  Solution 750 milliGRAM(s) Oral two times a day  levothyroxine 75 MICROGram(s) Oral daily  melatonin 3 milliGRAM(s) Oral at bedtime  methylPREDNISolone sodium succinate Injectable 20 milliGRAM(s) IV Push every 6 hours  remdesivir  IVPB 200 milliGRAM(s) IV Intermittent every 24 hours  remdesivir  IVPB   IV Intermittent   rivaroxaban 10 milliGRAM(s) Oral daily  senna 2 Tablet(s) Oral at bedtime      TELEMETRY: 	    ECG:  	  RADIOLOGY:  OTHER: 	  	  LABS:	 	    CARDIAC MARKERS:                                8.1    9.55  )-----------( 298      ( 16 Sep 2022 05:38 )             27.0     09-16    136  |  99  |  33<H>  ----------------------------<  124<H>  4.5   |  29  |  0.59    Ca    9.2      16 Sep 2022 05:38      proBNP: Serum Pro-Brain Natriuretic Peptide: 461 pg/mL (09-08 @ 20:56)  Serum Pro-Brain Natriuretic Peptide: 155 pg/mL (08-27 @ 21:06)    Lipid Profile:   HgA1c:   TSH:     Notes: Patient remains acute, + wheezing. No weekend discharge. Per Tabitha at  [facility], medical director unable to accept as patient is currently on NIV (non  invasive ventilation). [facility] will review on Monday for acceptance.        Assessment and plan  ---------------------------  86F w/ extensive hx including severe tracheobronchomalacia (c/b hypoxia 2/2 mucous plugging and recurrent L lung collapse), hemorraghic CVA (6/2017) w/ residual L sided weakness and dysphagia s/p PEG, HTN, HLD, CAD s/p MI with preserved LVEF, mod AS with possible vegetation on aortic valve on prior TTE in 12/2021 (MIKALA not pursued given high risk, s/p extended course of abx), R breast CA s/p mastectomy (2019) c/b likely mets to bone, perforated appendicitis c/b abscess (12/2021), gout, former EtOH abuse, MRSE/MRSA+ in the past, presenting again for SOB shortly after discharge to [facility] rehab. Very limited hx obtained from pt given mental status, dyspnea, and use of BIPAP. Unable to reach sonSy. History obtained from staff/chart review.    Per ED staff who saw pt when she first arrived, pt was notably dyspneic with increased WOB and wheezing. Supposedly missed use of nightly BIPAP at rehab. Reportedly denied fever and cough. On encounter for admission, pt with BIPAP mask on, appearing slightly lethargic and mildly dyspneic, but was able to answer some questions. Pt endorsed having SOB. Denied pain. Seemed to believe initially that she was at [facility] rehab? but was difficult to understand her responses.    Of note, pt has multiple recent admissions with similar presentation. Most recently was hospitalized from 8/28/22-9/8/22 for SOB, treated with airway clearance and completed 5-day course of Zosyn for empiric coverage of PNA (though per Pulm, unlikely to have PNA). Course c/b PEG displacement and subsequent reinsertion by IR on 8/30. In addition to her usual PEG feeds, pt was also started on puree diet with moderately thickened fluids for pleasure feeds. Also was started on anastrozole for most likely dx of metastatic breast cancer to bones (pt was unable to tolerate further cancer workup of spine lesions). Pt remained full code during recent admissions.  pt with metastatic ca to the bone with sob sec to obesity and underlying lung and cardiac disease  increase sob sec to missing BiPAP at night  may consider pulmonary eval  may consider hospice care/ palliative care  continue Diltiazem  pt will need home o2 and Bipap at night  discussed with family at the bed side   dvt prophylaxis high risk  pt with bone mets, no further nieto/ onc noted  xanax added for anxiety prn  started on iv solumedrol for wheezing  needs home o2  dc planning

## 2022-09-18 NOTE — PROGRESS NOTE ADULT - SUBJECTIVE AND OBJECTIVE BOX
afebrile    REVIEW OF SYSTEMS:  GEN: no fever,    no chills  RESP: no SOB,   no cough  CVS: no chest pain,   no palpitations  GI: no abdominal pain,   no nausea,   no vomiting,   no constipation,   no diarrhea  : no dysuria,   no frequency  NEURO: no headache,   no dizziness  PSYCH: no depression,   not anxious  Derm : no rash    MEDICATIONS  (STANDING):  ALBUTerol    90 MICROgram(s) HFA Inhaler 2 Puff(s) Inhalation four times a day  allopurinol 100 milliGRAM(s) Oral daily  anastrozole 1 milliGRAM(s) Oral daily  atorvastatin 20 milliGRAM(s) Oral at bedtime  bisacodyl 5 milliGRAM(s) Oral at bedtime  chlorhexidine 2% Cloths 1 Application(s) Topical <User Schedule>  diltiazem    Tablet 30 milliGRAM(s) Oral three times a day  insulin lispro (ADMELOG) corrective regimen sliding scale   SubCutaneous every 6 hours  ipratropium 17 MICROgram(s) HFA Inhaler 2 Puff(s) Inhalation four times a day  levETIRAcetam  Solution 750 milliGRAM(s) Oral two times a day  levothyroxine 75 MICROGram(s) Oral daily  melatonin 3 milliGRAM(s) Oral at bedtime  methylPREDNISolone sodium succinate Injectable 20 milliGRAM(s) IV Push every 8 hours  remdesivir  IVPB   IV Intermittent   remdesivir  IVPB 100 milliGRAM(s) IV Intermittent every 24 hours  rivaroxaban 10 milliGRAM(s) Oral daily  senna 2 Tablet(s) Oral at bedtime    MEDICATIONS  (PRN):  acetaminophen     Tablet .. 650 milliGRAM(s) Oral every 6 hours PRN Temp greater or equal to 38C (100.4F), Mild Pain (1 - 3)  ALPRAZolam 0.25 milliGRAM(s) Oral every 8 hours PRN severe anxiety  polyethylene glycol 3350 17 Gram(s) Oral daily PRN Constipation  traMADol 50 milliGRAM(s) Oral two times a day PRN Severe Pain (7 - 10)      Vital Signs Last 24 Hrs  T(C): 36.7 (18 Sep 2022 06:22), Max: 36.9 (17 Sep 2022 20:49)  T(F): 98 (18 Sep 2022 06:22), Max: 98.4 (17 Sep 2022 20:49)  HR: 94 (18 Sep 2022 06:22) (78 - 94)  BP: 118/70 (18 Sep 2022 06:22) (103/73 - 118/70)  BP(mean): --  RR: 20 (18 Sep 2022 06:22) (20 - 20)  SpO2: 98% (18 Sep 2022 06:22) (96% - 98%)    Parameters below as of 18 Sep 2022 06:22  Patient On (Oxygen Delivery Method): nasal cannula  O2 Flow (L/min): 4    CAPILLARY BLOOD GLUCOSE      POCT Blood Glucose.: 221 mg/dL (18 Sep 2022 11:56)  POCT Blood Glucose.: 205 mg/dL (18 Sep 2022 06:15)  POCT Blood Glucose.: 164 mg/dL (18 Sep 2022 00:21)  POCT Blood Glucose.: 207 mg/dL (17 Sep 2022 22:00)  POCT Blood Glucose.: 191 mg/dL (17 Sep 2022 16:33)  POCT Blood Glucose.: 240 mg/dL (17 Sep 2022 12:31)    I&O's Summary    17 Sep 2022 07:01  -  18 Sep 2022 07:00  --------------------------------------------------------  IN: 1290 mL / OUT: 250 mL / NET: 1040 mL        PHYSICAL EXAM:  HEAD:  Atraumatic, Normocephalic  NECK: Supple, No   JVD  CHEST/LUNG:   no     rales,     no,    rhonchi  HEART: Regular rate and rhythm;         murmur  ABDOMEN: Soft, Nontender, ;   EXTREMITIES:        edema  NEUROLOGY:   lethargic    LABS:    09-18    135  |  98  |  32<H>  ----------------------------<  240<H>  4.7   |  24  |  0.57    Ca    9.1      18 Sep 2022 09:59    TPro  6.8  /  Alb  3.8  /  TBili  0.2  /  DBili  x   /  AST  11  /  ALT  12  /  AlkPhos  204<H>  09-18            Lactate, Blood: 3.5 mmol/L (09-17 @ 15:43)      09-18 @ 03:50  4.8  47              Consultant(s) Notes Reviewed:      Care Discussed with Consultants/Other Providers:

## 2022-09-18 NOTE — CHART NOTE - NSCHARTNOTEFT_GEN_A_CORE
Patient with elevated lactate during AM shift 9/17/2022  Lactate- 3.7; repeat 3.5.  Patient with history of intrinsic lung disease and tracheobronchomalacia.  She is s/p BIPAP this admission.  Elevated lactate result d/w Dr. Dexter - at this time, requests ABG and blood cultures.     Notified by RN pt refusing ABG as per Respiratory Therapist  Pt seen and evaluated at bedside; Pt is A&O x4, adamantly refusing ABG secondary to pain.  Education provided, pt still refuses ABG  VBG ordered  BCX x2 pending.  Endorsed to RN  Will endorse to AM team Patient with elevated lactate during AM shift 9/17/2022  Lactate- 3.7; repeat 3.5.  Patient with history of intrinsic lung disease and tracheobronchomalacia.  She is s/p BIPAP this admission.  Elevated lactate result d/w Dr. Dexter - at this time, requests ABG and blood cultures.     Notified by RN pt refusing ABG as per Respiratory Therapist  Pt seen and evaluated at bedside; Pt is A&O x4, adamantly refusing ABG secondary to pain from previous venipuncture  Education provided, pt still refuses ABG  VBG ordered  BCX x2 pending.  Endorsed to RN  Will endorse to AM team

## 2022-09-18 NOTE — PROGRESS NOTE ADULT - ASSESSMENT
89    year old female    h/o hemorrhagic CVA, has  PEG,  HTN, MI,   HLD, gout, right ca  breast   right Ca breast. s/p mastectomy in 2019,  s/p  sentinel node  localization.  4/4,  were  negative  peg dislodged.  IR   replacements  on 1/3/22  s/p rrt  . in past,  for hypoxia. cxr with complete  collapsed  of  left lung, needing broch,   s/ p  bronchoscopy , pt  with  tracheomalacia  and  collapsed  airways,  makes  this a  difficult  situation, as there is no good rx for this     h/o bacteremia. on   pna, perforated  appendicitis,  ?  mets  to  acetabulum, was  seen by dr pacheco   surg/ IR  and  oncology eval dr pacheco   sa w pt.  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 this will not alter rx. pt  will need   extended  course  of ab   on iv  vanco and ertapenem.  till  2/9/.22  ID dr reagan, and per  surg  and  IR  eval,  no intervention   CT  1/20/22, no PE. collection in right side   s/p  rectal bleed.        *  admitted with   presumed  resp  failure/ pt was  sent back from [facility], the same day   pt seen in er.  appears   at her naseline.  no  wheezing /  atousable    ENT eval w/ laryngoscopy notable for vocal cords mobile and intact, no edema, upper airway patent     hypertonic saline for airway clearance   cxr, no pna     *   s/p cva, has  PEG,  npo  ,   on  synthroid, Keppra  ,  *   HTN, on  cardizem,  per  card dr jamison  *  h/o  Ca breast. /, s/p  R  mastectomy  *   obesity, bmi  was   41, now  is  30   *     c/c left  leg contracture ,  remains  bed  bound. functional  paraplegia,  needs  assistance  for  all her  adl's   *  pt  with  h/o  tracheomalacia  and  collapsed  airways,      given pt;s  mlple co morbidities,  pt is  at risk for  re admissions     on nocturnal  bipap    difficult  situation, a s there  is no  good  rx  for  pt's  recurrent lung collapse hypoxia    h/o  of  chronic    mild  tachycardia    re  admission  likely,, given her  airways, and  propensity  for  lung collapse      *   CT chest. lytic  lesions, T  spine/  ribs/ humerus/  oncoloigy  d r ohri.  suspect  metastatic ca breast        son. Sy guicho/ oncologist  has  also  discussed  with  son,  futility of  pursuing  bx. a s pt  is  not an ideal   candidate  for  chemo    CT  A.p ,/ prelim ,  pelvic  mets  per  oncology,   suspect  metastatic ca  breast    was  awaiting   mri  spine/  pt unable  to tolerate  mri   per oncology, no  further  intervention/  and on  Arimidex, now,   per d r ohri     no good  solution . given her mlple co morbidities   obesity,  bed bound.  functional paraplegia    per  son,   rehab saw pt  was a  bit anxious, and then promptly  returned   to er/ as they  did not  have  a  bipap machine /  care cortn to f/.p   needs  24/7  O2/ nocturnal bipap   difficult  situation,  bed  bound. obesity. c/c  hypercarbia, needing , prn /  nocturnal bipap/  with intermittent tachypnea    now  is  covid +. on iv steroid/ wheezing/ remdissivir/  hous e ID  eval    dvt ppx/  xarelto  for   30  days/  remains on iv steroid      per  son, pt is  full code

## 2022-09-18 NOTE — PROGRESS NOTE ADULT - SUBJECTIVE AND OBJECTIVE BOX
CARDIOLOGY     PROGRESS  NOTE   ________________________________________________    CHIEF COMPLAINT:Patient is a 87y old  Female who presents with a chief complaint of SOB (18 Sep 2022 10:07)  no complain.  	  REVIEW OF SYSTEMS:  CONSTITUTIONAL: No fever, weight loss, or fatigue  EYES: No eye pain, visual disturbances, or discharge  ENT:  No difficulty hearing, tinnitus, vertigo; No sinus or throat pain  NECK: No pain or stiffness  RESPIRATORY: No cough, wheezing, chills or hemoptysis; No Shortness of Breath  CARDIOVASCULAR: No chest pain, palpitations, passing out, dizziness, or leg swelling  GASTROINTESTINAL: No abdominal or epigastric pain. No nausea, vomiting, or hematemesis; No diarrhea or constipation. No melena or hematochezia.  GENITOURINARY: No dysuria, frequency, hematuria, or incontinence  NEUROLOGICAL: No headaches, memory loss, loss of strength, numbness, or tremors  SKIN: No itching, burning, rashes, or lesions   LYMPH Nodes: No enlarged glands  ENDOCRINE: No heat or cold intolerance; No hair loss  MUSCULOSKELETAL: No joint pain or swelling; No muscle, back, or extremity pain  PSYCHIATRIC: No depression, anxiety, mood swings, or difficulty sleeping  HEME/LYMPH: No easy bruising, or bleeding gums  ALLERGY AND IMMUNOLOGIC: No hives or eczema	    [ ] All others negative	  [ ] Unable to obtain    PHYSICAL EXAM:  T(C): 36.7 (09-18-22 @ 06:22), Max: 36.9 (09-17-22 @ 20:49)  HR: 94 (09-18-22 @ 06:22) (78 - 96)  BP: 118/70 (09-18-22 @ 06:22) (91/60 - 118/70)  RR: 20 (09-18-22 @ 06:22) (20 - 20)  SpO2: 98% (09-18-22 @ 06:22) (96% - 100%)  Wt(kg): --  I&O's Summary    17 Sep 2022 07:01  -  18 Sep 2022 07:00  --------------------------------------------------------  IN: 1290 mL / OUT: 250 mL / NET: 1040 mL        Appearance: Normal	  HEENT:   Normal oral mucosa, PERRL, EOMI	  Lymphatic: No lymphadenopathy  Cardiovascular: Normal S1 S2, No JVD, + murmurs, No edema  Respiratoryrhonchi  Psychiatry: A & O x 3, Mood & affect appropriate  Gastrointestinal:  Soft, Non-tender, + BS	  Skin: No rashes, No ecchymoses, No cyanosis	  Neurologic: Non-focal  Extremities: Normal range of motion, No clubbing, cyanosis or edema  Vascular: Peripheral pulses palpable 2+ bilaterally    MEDICATIONS  (STANDING):  ALBUTerol    90 MICROgram(s) HFA Inhaler 2 Puff(s) Inhalation four times a day  allopurinol 100 milliGRAM(s) Oral daily  anastrozole 1 milliGRAM(s) Oral daily  atorvastatin 20 milliGRAM(s) Oral at bedtime  bisacodyl 5 milliGRAM(s) Oral at bedtime  chlorhexidine 2% Cloths 1 Application(s) Topical <User Schedule>  diltiazem    Tablet 30 milliGRAM(s) Oral three times a day  insulin lispro (ADMELOG) corrective regimen sliding scale   SubCutaneous every 6 hours  ipratropium 17 MICROgram(s) HFA Inhaler 2 Puff(s) Inhalation four times a day  levETIRAcetam  Solution 750 milliGRAM(s) Oral two times a day  levothyroxine 75 MICROGram(s) Oral daily  melatonin 3 milliGRAM(s) Oral at bedtime  methylPREDNISolone sodium succinate Injectable 20 milliGRAM(s) IV Push every 8 hours  remdesivir  IVPB   IV Intermittent   remdesivir  IVPB 100 milliGRAM(s) IV Intermittent every 24 hours  rivaroxaban 10 milliGRAM(s) Oral daily  senna 2 Tablet(s) Oral at bedtime      TELEMETRY: 	    ECG:  	  RADIOLOGY:  OTHER: 	  	  LABS:	 	    CARDIAC MARKERS:                  proBNP: Serum Pro-Brain Natriuretic Peptide: 461 pg/mL (09-08 @ 20:56)  Serum Pro-Brain Natriuretic Peptide: 155 pg/mL (08-27 @ 21:06)    Lipid Profile:   HgA1c:   TSH:         Assessment and plan  ---------------------------  86F w/ extensive hx including severe tracheobronchomalacia (c/b hypoxia 2/2 mucous plugging and recurrent L lung collapse), 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 Very limited hx obtained from pt given mental status, dyspnea, and use of BIPAP. Unable to reach sonSy. History obtained from staff/chart review.    Per ED staff who saw pt when she first arrived, pt was notably dyspneic with increased WOB and wheezing. Supposedly missed use of nightly BIPAP at rehab. Reportedly denied fever and cough. On encounter for admission, pt with BIPAP mask on, appearing slightly lethargic and mildly dyspneic, but was able to answer some questions. Pt endorsed having SOB. Denied pain. Seemed to believe initially that she was at [facility] re[facility]? but was difficult to understand her responses.    Of note, pt has multiple recent admissions with similar presentation. Most recently was hospitalized from 8/28/22-9/8/22 for SOB, treated with airway clearance and completed 5-day course of Zosyn for empiric coverage of PNA (though per Pulm, unlikely to have PNA). Course c/b PEG displacement and subsequent reinsertion by IR on 8/30. In addition to her usual PEG feeds, pt was also started on puree diet with moderately thickened fluids for pleasure feeds. Also was started on anastrozole for most likely dx of metastatic breast cancer to bones (pt was unable to tolerate further cancer workup of spine lesions). Pt remained full code during recent admissions.  pt with metastatic ca to the bone with sob sec to obesity and underlying lung and cardiac disease  increase sob sec to missing BiPAP at night  may consider pulmonary eval  may consider hospice care/ palliative care  continue Diltiazem  pt will need home o2 and Bipap at night  discussed with family at the bed side   dvt prophylaxis high risk  pt with bone mets, no further nieto/ onc noted  xanax added for anxiety prn  started on iv solumedrol for wheezing taper to prednisone po  needs home o2  dc planning

## 2022-09-18 NOTE — PROGRESS NOTE ADULT - ASSESSMENT
ASSESSMENT:     87 year old gentlewoman, lifelong non-smoker, well known to our group without history of intrinsic lung disease. 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 She also has a history of HTN, HLD, CAD s/p MI with preserved LVEF and moderate aortic stenosis.    She was admitted on 8/29 with shortness of breath in the setting of back, neck and left lower extremity pain. She required NIV for mild acute hypercapnic respiratory failure that quickly resolved. Her respiratory status remained stable on room air and on her usual nebs and mucolytics. She was started on a puree diet with moderately thickened fluids in addition to PEG feeds following evaluation by the speech and swallow team. She was started on anastrazole for likely metastatic breast cancer to the bone. On the day of discharge, the patient was quite anxious with shortness of breath, chest congestion and wheeze - she was making a grunting noise with expiration. She was returned from [facility] that evening. She is still subjectively dyspneic although VBG looks better. CT without large consolidation or large plug/atelectasis. No fevers, chills or sweats. No chest pain/pressure or palpitations.      she has severe tracheobronchomalacia that has resulted in mucous plugging with complete left lung collapse requiring several bronchoscopies for pulmonary toilet in the past - the patient was felt not to be a candidate for surgery for the tracheobronchomalacia and stenting was felt to have more risk than benefit     metastatic breast cancer    Nosocomial covid +    PLAN/RECOMMENDATIONS:      ID saw pt. Remdesivir started. On solumedrol. DVT prophylaxis.   Follow inflammatory markers.  oxygen supplementation to keep saturation greater than 92% - currently on a 4lpm nasal canula  Decrease solumedrol 20mg IV Q8. Taper as able.  albuterol and atrovent inhalers. Unable to use nebs.  guaifenesin 300mg 4 times daily   incentive spirometry   acapella device   Oncology has seen pt.    s/p right simple mastectomy 4/2019 for pTqcpN0 breast cancer but did not receive adjuvant hormonal therapy; per notes/signout, it is likely that the patient has metastatic breast cancer    started on anastrozole as the patient could not tolerate a MRI and is not a candidate for bone biopsy  treatment of HTN, HLD, CAD, aortic stenosis per cardiology  DVT prophylaxis - on xarelto    She would be a candidate for chronic NIV after discharge at her outpatient living situation      Liborio Dexter MD  Pulmonary Medicine  [phone]

## 2022-09-18 NOTE — PROGRESS NOTE ADULT - SUBJECTIVE AND OBJECTIVE BOX
Follow-up Pulm Progress Note    The patient was seen and examined. Notes reviewed and discussed with staff/team as applicable      No new respiratory events overnight. D/w NP regarding lactate on VBG. Repeat was improved. Pt refused ABG.    She seems about the same. No complaints. On NC O2.    Denies: increased SOB, Chest pain, increased cough, colored phlegm, hemoptysis, N/V/D,      Vital Signs Last 24 Hrs  T(C): 36.7 (18 Sep 2022 06:22), Max: 36.9 (17 Sep 2022 20:49)  T(F): 98 (18 Sep 2022 06:22), Max: 98.4 (17 Sep 2022 20:49)  HR: 94 (18 Sep 2022 06:22) (78 - 96)  BP: 118/70 (18 Sep 2022 06:22) (91/60 - 118/70)  BP(mean): --  RR: 20 (18 Sep 2022 06:22) (20 - 20)  SpO2: 98% (18 Sep 2022 06:22) (96% - 100%)    Parameters below as of 18 Sep 2022 06:22  Patient On (Oxygen Delivery Method): nasal cannula  O2 Flow (L/min): 4            09-17 @ 07:01  -  09-18 @ 07:00  --------------------------------------------------------  IN: 1290 mL / OUT: 250 mL / NET: 1040 mL          Medications:  MEDICATIONS  (STANDING):  ALBUTerol    90 MICROgram(s) HFA Inhaler 2 Puff(s) Inhalation four times a day  allopurinol 100 milliGRAM(s) Oral daily  anastrozole 1 milliGRAM(s) Oral daily  atorvastatin 20 milliGRAM(s) Oral at bedtime  bisacodyl 5 milliGRAM(s) Oral at bedtime  chlorhexidine 2% Cloths 1 Application(s) Topical <User Schedule>  diltiazem    Tablet 30 milliGRAM(s) Oral three times a day  insulin lispro (ADMELOG) corrective regimen sliding scale   SubCutaneous every 6 hours  ipratropium 17 MICROgram(s) HFA Inhaler 2 Puff(s) Inhalation four times a day  levETIRAcetam  Solution 750 milliGRAM(s) Oral two times a day  levothyroxine 75 MICROGram(s) Oral daily  melatonin 3 milliGRAM(s) Oral at bedtime  methylPREDNISolone sodium succinate Injectable 20 milliGRAM(s) IV Push every 6 hours  remdesivir  IVPB   IV Intermittent   remdesivir  IVPB 100 milliGRAM(s) IV Intermittent every 24 hours  rivaroxaban 10 milliGRAM(s) Oral daily  senna 2 Tablet(s) Oral at bedtime    MEDICATIONS  (PRN):  acetaminophen     Tablet .. 650 milliGRAM(s) Oral every 6 hours PRN Temp greater or equal to 38C (100.4F), Mild Pain (1 - 3)  ALPRAZolam 0.25 milliGRAM(s) Oral every 8 hours PRN severe anxiety  polyethylene glycol 3350 17 Gram(s) Oral daily PRN Constipation  traMADol 50 milliGRAM(s) Oral two times a day PRN Severe Pain (7 - 10)      Allergies    Toradol (Urticaria (Mild to Mod); Rash (Mild to Mod))    Intolerances        Vent settings (if applicable)        Physical Examination:    Pleasant obese, chronically ill appearing female, NAD  Neck: no JVD, LAD, accessory muscle use  PULM: Coarse BS bilaterally, decreased wheezing  CVS: Regular rate and rhythm, S1S2  Abdomen: soft, obese,   Extremities: no LE edema  Neuro: L weakness, she is alert said she had pfizer vaccine    Other:    Blood Gas Profile - Venous (09.18.22 @ 03:50)   pH, Venous: 7.39   pCO2, Venous: 48 mmHg   pO2, Venous: 47 mmHg   HCO3, Venous: 29 mmol/L   Base Excess, Venous: 3.6 mmol/L   Oxygen Saturation, Venous: 78.2 %   Total CO2, Venous: 31 mmol/L   Blood Gas Source Venous: Venous     Blood Gas Venous - Lactate (09.18.22 @ 03:50)   Blood Gas Venous - Lactate: 1.7 mmol/L       Historical Values  Blood Gas Venous - Lactate (09.18.22 @ 03:50)   Blood Gas Venous - Lactate: 1.7 mmol/L   Blood Gas Venous - Lactate (09.17.22 @ 10:57)   Blood Gas Venous - Lactate: 3.7: Elevated lactate. Consider ordering follow-up lactate to trend. mmol/L   Blood Gas Venous - Lactate (09.16.22 @ 05:40)   Blood Gas Venous - Lactate: 1.4 mmol/L   Blood Gas Venous - Lactate (09.12.22 @ 14:34)   Blood Gas Venous - Lactate: 1.4 mmol/L   Blood Gas Venous - Lactate (09.08.22 @ 20:31)   Blood Gas Venous - Lactate: 1.6 mmol/L   Blood Gas Venous - Lactate (08.27.22 @ 20:50)   Blood Gas Venous - Lactate: 2.5: Elevated lactate. Consider ordering follow-up lactate to trend. mmol/L   Blood Gas Venous - Lactate (03.16.22 @ 21:50)   Blood Gas Venous - Lactate: 2.6: Elevated lactate. Consider ordering follow-up lactate to trend. mmol/L   Blood Gas Venous - Lactate (03.16.22 @ 19:57)

## 2022-09-19 NOTE — PROGRESS NOTE ADULT - SUBJECTIVE AND OBJECTIVE BOX
afebriel    REVIEW OF SYSTEMS:  GEN: no fever,    no chills  RESP: no SOB,   no cough  CVS: no chest pain,   no palpitations  GI: no abdominal pain,   no nausea,   no vomiting,   no constipation,   no diarrhea  : no dysuria,   no frequency  NEURO: no headache,   no dizziness  PSYCH: no depression,   not anxious  Derm : no rash    MEDICATIONS  (STANDING):  ALBUTerol    90 MICROgram(s) HFA Inhaler 2 Puff(s) Inhalation four times a day  allopurinol 100 milliGRAM(s) Oral daily  anastrozole 1 milliGRAM(s) Oral daily  atorvastatin 20 milliGRAM(s) Oral at bedtime  bisacodyl 5 milliGRAM(s) Oral at bedtime  chlorhexidine 2% Cloths 1 Application(s) Topical <User Schedule>  diltiazem    Tablet 30 milliGRAM(s) Oral three times a day  insulin lispro (ADMELOG) corrective regimen sliding scale   SubCutaneous every 6 hours  ipratropium 17 MICROgram(s) HFA Inhaler 2 Puff(s) Inhalation four times a day  levETIRAcetam  Solution 750 milliGRAM(s) Oral two times a day  levothyroxine 75 MICROGram(s) Oral daily  melatonin 3 milliGRAM(s) Oral at bedtime  methylPREDNISolone sodium succinate Injectable 20 milliGRAM(s) IV Push every 8 hours  remdesivir  IVPB   IV Intermittent   remdesivir  IVPB 100 milliGRAM(s) IV Intermittent every 24 hours  rivaroxaban 10 milliGRAM(s) Oral daily  senna 2 Tablet(s) Oral at bedtime    MEDICATIONS  (PRN):  acetaminophen     Tablet .. 650 milliGRAM(s) Oral every 6 hours PRN Temp greater or equal to 38C (100.4F), Mild Pain (1 - 3)  ALPRAZolam 0.25 milliGRAM(s) Oral every 8 hours PRN severe anxiety  polyethylene glycol 3350 17 Gram(s) Oral daily PRN Constipation  traMADol 50 milliGRAM(s) Oral two times a day PRN Severe Pain (7 - 10)      Vital Signs Last 24 Hrs  T(C): 36.7 (19 Sep 2022 06:23), Max: 36.7 (18 Sep 2022 12:08)  T(F): 98 (19 Sep 2022 06:23), Max: 98 (18 Sep 2022 12:08)  HR: 100 (19 Sep 2022 06:23) (100 - 112)  BP: 103/75 (19 Sep 2022 06:23) (103/75 - 126/60)  BP(mean): --  RR: 20 (19 Sep 2022 06:23) (20 - 22)  SpO2: 98% (19 Sep 2022 06:23) (98% - 100%)    Parameters below as of 19 Sep 2022 06:23  Patient On (Oxygen Delivery Method): nasal cannula      CAPILLARY BLOOD GLUCOSE      POCT Blood Glucose.: 179 mg/dL (19 Sep 2022 05:50)  POCT Blood Glucose.: 162 mg/dL (18 Sep 2022 23:38)  POCT Blood Glucose.: 190 mg/dL (18 Sep 2022 17:50)  POCT Blood Glucose.: 221 mg/dL (18 Sep 2022 11:56)    I&O's Summary    18 Sep 2022 07:01  -  19 Sep 2022 07:00  --------------------------------------------------------  IN: 0 mL / OUT: 450 mL / NET: -450 mL        PHYSICAL EXAM:  HEAD:  Atraumatic, Normocephalic  NECK: Supple, No   JVD  CHEST/LUNG:   no     rales,   less  rhonchi  HEART: Regular rate and rhythm;         murmur  ABDOMEN: Soft, Nontender, ;   EXTREMITIES:    no    edema  NEUROLOGY:  alert    LABS:    09-18    135  |  98  |  32<H>  ----------------------------<  240<H>  4.7   |  24  |  0.57    Ca    9.1      18 Sep 2022 09:59    TPro  6.8  /  Alb  3.8  /  TBili  0.2  /  DBili  x   /  AST  11  /  ALT  12  /  AlkPhos  204<H>  09-18            Lactate, Blood: 3.5 mmol/L (09-17 @ 15:43)      09-18 @ 03:50  4.8  47              Consultant(s) Notes Reviewed:      Care Discussed with Consultants/Other Providers:

## 2022-09-19 NOTE — PROGRESS NOTE ADULT - SUBJECTIVE AND OBJECTIVE BOX
CARDIOLOGY     PROGRESS  NOTE   ________________________________________________    CHIEF COMPLAINT:Patient is a 87y old  Female who presents with a chief complaint of SOB (19 Sep 2022 07:17)  no complain.  	  REVIEW OF SYSTEMS:  CONSTITUTIONAL: No fever, weight loss, or fatigue  EYES: No eye pain, visual disturbances, or discharge  ENT:  No difficulty hearing, tinnitus, vertigo; No sinus or throat pain  NECK: No pain or stiffness  RESPIRATORY: No cough, wheezing, chills or hemoptysis; No Shortness of Breath  CARDIOVASCULAR: No chest pain, palpitations, passing out, dizziness, or leg swelling  GASTROINTESTINAL: No abdominal or epigastric pain. No nausea, vomiting, or hematemesis; No diarrhea or constipation. No melena or hematochezia.  GENITOURINARY: No dysuria, frequency, hematuria, or incontinence  NEUROLOGICAL: No headaches, memory loss, loss of strength, numbness, or tremors  SKIN: No itching, burning, rashes, or lesions   LYMPH Nodes: No enlarged glands  ENDOCRINE: No heat or cold intolerance; No hair loss  MUSCULOSKELETAL: No joint pain or swelling; No muscle, back, or extremity pain  PSYCHIATRIC: No depression, anxiety, mood swings, or difficulty sleeping  HEME/LYMPH: No easy bruising, or bleeding gums  ALLERGY AND IMMUNOLOGIC: No hives or eczema	    [ ] All others negative	  [ ] Unable to obtain    PHYSICAL EXAM:  T(C): 36.7 (09-19-22 @ 06:23), Max: 36.7 (09-18-22 @ 12:08)  HR: 100 (09-19-22 @ 06:23) (100 - 112)  BP: 103/75 (09-19-22 @ 06:23) (103/75 - 126/60)  RR: 20 (09-19-22 @ 06:23) (20 - 22)  SpO2: 98% (09-19-22 @ 06:23) (98% - 100%)  Wt(kg): --  I&O's Summary    18 Sep 2022 07:01  -  19 Sep 2022 07:00  --------------------------------------------------------  IN: 0 mL / OUT: 450 mL / NET: -450 mL        Appearance: Normal	  HEENT:   Normal oral mucosa, PERRL, EOMI	  Lymphatic: No lymphadenopathy  Cardiovascular: Normal S1 S2, No JVD, + murmurs, No edema  Respiratory: rhonchi  Psychiatry:anxiuos  Gastrointestinal:  Soft, Non-tender, + BS	  Skin: No rashes, No ecchymoses, No cyanosis	  Neurologic: Non-focal  Extremities: Normal range of motion, No clubbing, cyanosis or edema  Vascular: Peripheral pulses palpable 2+ bilaterally    MEDICATIONS  (STANDING):  ALBUTerol    90 MICROgram(s) HFA Inhaler 2 Puff(s) Inhalation four times a day  allopurinol 100 milliGRAM(s) Oral daily  anastrozole 1 milliGRAM(s) Oral daily  atorvastatin 20 milliGRAM(s) Oral at bedtime  bisacodyl 5 milliGRAM(s) Oral at bedtime  chlorhexidine 2% Cloths 1 Application(s) Topical <User Schedule>  diltiazem    Tablet 30 milliGRAM(s) Oral three times a day  insulin lispro (ADMELOG) corrective regimen sliding scale   SubCutaneous every 6 hours  ipratropium 17 MICROgram(s) HFA Inhaler 2 Puff(s) Inhalation four times a day  levETIRAcetam  Solution 750 milliGRAM(s) Oral two times a day  levothyroxine 75 MICROGram(s) Oral daily  melatonin 3 milliGRAM(s) Oral at bedtime  methylPREDNISolone sodium succinate Injectable 20 milliGRAM(s) IV Push every 8 hours  remdesivir  IVPB   IV Intermittent   remdesivir  IVPB 100 milliGRAM(s) IV Intermittent every 24 hours  rivaroxaban 10 milliGRAM(s) Oral daily  senna 2 Tablet(s) Oral at bedtime      TELEMETRY: 	    ECG:  	  RADIOLOGY:  OTHER: 	  	  LABS:	 	    CARDIAC MARKERS:            09-18    135  |  98  |  32<H>  ----------------------------<  240<H>  4.7   |  24  |  0.57    Ca    9.1      18 Sep 2022 09:59    TPro  6.8  /  Alb  3.8  /  TBili  0.2  /  DBili  x   /  AST  11  /  ALT  12  /  AlkPhos  204<H>  09-18    proBNP: Serum Pro-Brain Natriuretic Peptide: 461 pg/mL (09-08 @ 20:56)  Serum Pro-Brain Natriuretic Peptide: 155 pg/mL (08-27 @ 21:06)    Lipid Profile:   HgA1c:   TSH:         Assessment and plan  ---------------------------  86F w/ extensive hx including severe tracheobronchomalacia (c/b hypoxia 2/2 mucous plugging and recurrent L lung collapse), 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 Very limited hx obtained from pt given mental status, dyspnea, and use of BIPAP. Unable to reach sonSy. History obtained from staff/chart review.    Per ED staff who saw pt when she first arrived, pt was notably dyspneic with increased WOB and wheezing. Supposedly missed use of nightly BIPAP at rehab. Reportedly denied fever and cough. On encounter for admission, pt with BIPAP mask on, appearing slightly lethargic and mildly dyspneic, but was able to answer some questions. Pt endorsed having SOB. Denied pain. Seemed to believe initially that she was at [facility]? but was difficult to understand her responses.    Of note, pt has multiple recent admissions with similar presentation. Most recently was hospitalized from 8/28/22-9/8/22 for SOB, treated with airway clearance and completed 5-day course of Zosyn for empiric coverage of PNA (though per Pulm, unlikely to have PNA). Course c/b PEG displacement and subsequent reinsertion by IR on 8/30. In addition to her usual PEG feeds, pt was also started on puree diet with moderately thickened fluids for pleasure feeds. Also was started on anastrozole for most likely dx of metastatic breast cancer to bones (pt was unable to tolerate further cancer workup of spine lesions). Pt remained full code during recent admissions.  pt with metastatic ca to the bone with sob sec to obesity and underlying lung and cardiac disease  increase sob sec to missing BiPAP at night  may consider pulmonary eval  may consider hospice care/ palliative care  continue Diltiazem  pt will need home o2 and Bipap at night  discussed with family at the bed side   dvt prophylaxis high risk  pt with bone mets, no further nieto/ onc noted  on steroid, taper gradually ?change to po  needs home o2

## 2022-09-19 NOTE — PROGRESS NOTE ADULT - ASSESSMENT
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 She also has a history of HTN, HLD, CAD s/p MI with preserved LVEF and moderate aortic stenosis. The patient was admitted to [facility] in December 2021 with fever and abdominal pain due to perforated appendicitis. She was treated with tube drainage and antibiotics for a polymicrobial infection. Hospital course was complicated by respiratory failure due to mucous plugging with complete collapse of the left lung. She underwent several bronchoscopies for pulmonary toilet and was found to have severe tracheobronchomalacia. Her respiratory status has remained "stable" while at [facility] on nebulized bronchodilators and hypertonic saline and without nocturnal NIV. She was admitted in March with hematochezia. The left lower lobe was well aerated on a CT scan of the abdomen and pelvis with only bibasilar subsegmental atelectasis - there was scattered sigmoid diverticulosis without evidence of diverticulitis - interval decrease in size of a fluid collection in the region of the previously perforated appendicitis measuring 2.8 x 1.5 cm previously measuring 4.0 x 2.2 cm - slight increase in mild adjacent inflammatory change. The GI bleed was treated conservatively.  She was admitted on 8/29 with shortness of breath in the setting of back, neck and left lower extremity pain. She required NIV for mild acute hypercapnic respiratory failure that quickly resolved. Her respiratory status remained stable on room air and on her usual nebs and mucolytics. She was started on a puree diet with moderately thickened fluids in addition to PEG feeds following evaluation by the speech and swallow team. She was started on anastrazole for likely metastatic breast cancer to the bone. On the day of discharge, the patient was quite anxious with shortness of breath, chest congestion and wheeze - she was making a grunting noise with expiration. She was returned from [facility] that evening. Currently she is tachypneic and using accessory muscles of respiration. She has an audible wheeze and cough productive of scant sputum. No fevers, chills or sweats. No chest pain/pressure or palpitations.     the patient has new onset bronchospasm with increased work of breathing and worsening left lower lobe atelectasis -  she has severe tracheobronchomalacia that has resulted in mucous plugging with complete left lung collapse requiring several bronchoscopies for pulmonary toilet in the past - the patient was felt not to be a candidate for surgery for the tracheobronchomalacia and stenting was felt to have more risk than benefit     metastatic breast cancer    9/19 -  found to be COVID positive on discharge [facility]; awake and alert; now with marked worsening of shortness of breath and using her accessory muscles of respiration; severe chest congestion and wheeze exacerbated by neck flexion; increasing pCO2, WBC and lactate; occasional cough productive of scant sputum; no fevers, chills or sweats; no chest pain/pressure or palpitations; back on PEG feeds; switched from nebs to inhalers yesterday as Dr. Dexter was informed by nursing that COVID positive patients cannot receive nebs at [facility];      PLAN/RECOMMENDATIONS:    replace on AVAPS  VBG 9/19 -> 7.34/53/50/29/80.0 - worsening respiratory acidosis with lactate 3.5  repeat CXR ->   remdesivir x 5 days - following renal and liver function  solumedrol 20mg IV q8h  albuterol/atrovent nebs q4h  pulmicort 0.5mg nebs q12h - give after duoneb - rinse mouth after use  guaifenesin 300mg 4 times daily via PEG  incentive spirometry   acapella device   would keep NPO while on AVAPS  follow inflammatory markers  CT scan 8/31 -> multiple lytic lesions are seen throughout the axial and appendicular skeleton, some of which appear increased in size from CT abdomen pelvis 3/16/2022 when evaluated in retrospect - a few lytic lesions involve the left intertrochanteric region and right acetabulum  oncology follow-up noted    s/p right simple mastectomy 4/2019 for pTqcpN0 breast cancer but did not receive adjuvant hormonal therapy    it is likely that the patient has metastatic breast cancer    started on anastrozole as the patient could not tolerate a MRI and is not a candidate for bone biopsy  treatment of HTN, HLD, CAD, aortic stenosis per cardiology  DVT prophylaxis - xarelto  glucose control  analgesics    Will follow with you. Plan of care discussed with the patient and her RN at bedside and the dedicated floor NP.     Kennedy Marcano MD, [facility]  [phone]  Pulmonary Medicine

## 2022-09-19 NOTE — PROGRESS NOTE ADULT - ASSESSMENT
89    year old female    h/o hemorrhagic CVA, has  PEG,  HTN, MI,   HLD, gout, right ca  breast   right Ca breast. s/p mastectomy in 2019,  s/p  sentinel node  localization.  4/4,  were  negative  peg dislodged.  IR   replacements  on 1/3/22  s/p rrt  . in past,  for hypoxia. cxr with complete  collapsed  of  left lung, needing broch,   s/ p  bronchoscopy , pt  with  tracheomalacia  and  collapsed  airways,  makes  this a  difficult  situation, as there is no good rx for this     h/o bacteremia. on   pna, perforated  appendicitis,  ?  mets  to  acetabulum, was  seen by dr pacheco   surg/ IR  and  oncology eval dr pacheco   sa w pt.  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 this will not alter rx. pt  will need   extended  course  of ab   on iv  vanco and ertapenem.  till  2/9/.22  ID dr reagan, and per  surg  and  IR  eval,  no intervention   CT  1/20/22, no PE. collection in right side   s/p  rectal bleed.        *  admitted with   presumed  resp  failure/ pt was  sent back from [facility], the same day   pt seen in er.  appears   at her naseline.  no  wheezing /  atousable    ENT eval w/ laryngoscopy notable for vocal cords mobile and intact, no edema, upper airway patent     hypertonic saline for airway clearance   cxr, no pna     *   s/p cva, has  PEG,  npo  ,   on  synthroid, Keppra  ,  *   HTN, on  cardizem,  per  card dr jamison  *  h/o  Ca breast. /, s/p  R  mastectomy  *   obesity, bmi  was   41, now  is  30   *     c/c left  leg contracture ,  remains  bed  bound. functional  paraplegia,  needs  assistance  for  all her  adl's   *  pt  with  h/o  tracheomalacia  and  collapsed  airways,      given pt;s  mlple co morbidities,  pt is  at risk for  re admissions     on nocturnal  bipap    difficult  situation, a s there  is no  good  rx  for  pt's  recurrent lung collapse hypoxia    h/o  of  chronic    mild  tachycardia    re  admission  likely,, given her  airways, and  propensity  for  lung collapse      *   CT chest. lytic  lesions, T  spine/  ribs/ humerus/  oncoloigy  d r ohri.  suspect  metastatic ca breast        son. Sy, updated/ oncologist  has  also  discussed  with  son,  futility of  pursuing  bx. a s pt  is  not an ideal   candidate  for  chemo    CT  A.p ,/ prelim ,  pelvic  mets  per  oncology,   suspect  metastatic ca  breast    was  awaiting   mri  spine/  pt unable  to tolerate  mri   per oncology, no  further  intervention/  and on  Arimidex, now,   per d r ohri     no good  solution . given her mlple co morbidities   obesity,  bed bound.  functional paraplegia    per  son,   rehab saw pt  was a  bit anxious, and then promptly  returned   to er/ as they  did not  have  a  bipap machine /  care cortn to f/.p   needs  24/7  O2/ nocturnal bipap   difficult  situation,  bed  bound. obesity. c/c  hypercarbia, needing , prn /  nocturnal bipap/  with intermittent tachypnea    now  is  covid +. on iv steroid/ wheezing/ remdissivir/, seen by  house  ID     dvt ppx/  xarelto  for   30  days/  remains on iv steroid  pt  with  frequent episodes  of tachypnea. has  obstructive /restrictive   lung  disease ,  obesity/ RAQUEL/ hypercarbia  will  speak  again  with son regarding   dnr      per  son, pt is  full code

## 2022-09-19 NOTE — PROGRESS NOTE ADULT - SUBJECTIVE AND OBJECTIVE BOX
[facility] - [facility] - PROGRESS NOTE    CHIEF COMPLAINT: COVID infection; chronic hypoxic/hypercapnic respiratory failure; mucous plugging; atelectasis; tracheobronchomalacia; bronchospasm; weak cough; 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; PEG in place    INTERVAL HISTORY: found to be COVID positive on discharge RVP; awake and alert; now with marked worsening of shortness of breath and using her accessory muscles of respiration; severe chest congestion and wheeze exacerbated by neck flexion; increasing pCO2, WBC and lactate; occasional cough productive of scant sputum; no fevers, chills or sweats; no chest pain/pressure or palpitations; back on PEG feeds; switched from nebs to inhalers yesterday as Dr. Dexter was informed by nursing that COVID positive patients cannot receive nebs at [place];    REVIEW OF SYSTEMS:  Constitutional: As per interval history  HEENT: Within normal limits  CV: As per interval history  Resp: As per interval history  GI: dysphagia  : Within normal limits  Musculoskeletal: Within normal limits  Skin: Within normal limits  Neurological: CVA - left sided plegia  Psychiatric: Within normal limits  Endocrine: Within normal limits  Hematologic/Lymphatic: Within normal limits  Allergic/Immunologic: Within normal limits    MEDICATIONS:     Pulmonary "  ALBUTerol    90 MICROgram(s) HFA Inhaler 2 Puff(s) Inhalation four times a day  albuterol/ipratropium for Nebulization 3 milliLiter(s) Nebulizer every 6 hours  ipratropium 17 MICROgram(s) HFA Inhaler 2 Puff(s) Inhalation four times a day    Anti-microbials:  remdesivir  IVPB   IV Intermittent   remdesivir  IVPB 100 milliGRAM(s) IV Intermittent every 24 hours    Cardiovascular:  diltiazem    Tablet 30 milliGRAM(s) Oral three times a day    Other:  allopurinol 100 milliGRAM(s) Oral daily  anastrozole 1 milliGRAM(s) Oral daily  atorvastatin 20 milliGRAM(s) Oral at bedtime  bisacodyl 5 milliGRAM(s) Oral at bedtime  chlorhexidine 2% Cloths 1 Application(s) Topical <User Schedule>  insulin lispro (ADMELOG) corrective regimen sliding scale   SubCutaneous every 6 hours  levETIRAcetam  Solution 750 milliGRAM(s) Oral two times a day  levothyroxine 75 MICROGram(s) Oral daily  melatonin 3 milliGRAM(s) Oral at bedtime  methylPREDNISolone sodium succinate Injectable 20 milliGRAM(s) IV Push every 8 hours  rivaroxaban 10 milliGRAM(s) Oral daily  senna 2 Tablet(s) Oral at bedtime    MEDICATIONS  (PRN):  acetaminophen     Tablet .. 650 milliGRAM(s) Oral every 6 hours PRN Temp greater or equal to 38C (100.4F), Mild Pain (1 - 3)  ALPRAZolam 0.25 milliGRAM(s) Oral every 8 hours PRN severe anxiety  polyethylene glycol 3350 17 Gram(s) Oral daily PRN Constipation  traMADol 50 milliGRAM(s) Oral two times a day PRN Severe Pain (7 - 10)      OBJECTIVE:    POCT Blood Glucose.: 228 mg/dL (19 Sep 2022 11:51)  POCT Blood Glucose.: 179 mg/dL (19 Sep 2022 05:50)  POCT Blood Glucose.: 162 mg/dL (18 Sep 2022 23:38)  POCT Blood Glucose.: 190 mg/dL (18 Sep 2022 17:50)      PHYSICAL EXAM:       ICU Vital Signs Last 24 Hrs  T(C): 36.6 (19 Sep 2022 11:35), Max: 36.7 (19 Sep 2022 06:23)  T(F): 97.9 (19 Sep 2022 11:35), Max: 98 (19 Sep 2022 06:23)  HR: 111 (19 Sep 2022 11:35) (100 - 111)  BP: 109/57 (19 Sep 2022 11:35) (103/75 - 109/57)  BP(mean): --  ABP: --  ABP(mean): --  RR: 22 (19 Sep 2022 11:35) (20 - 36)  SpO2: 98% (19 Sep 2022 11:35) (94% - 98%) on 4lpm nasal canula     General: Awake. Alert. Cooperative. Using accessory muscles of respirations. Appears stated age. Obese. Bedbound.   HEENT: Atraumatic. Normocephalic. Anicteric. Normal oral mucosa. PERRL. EOMI. Mallampati class IV airway. Poor dentition.  Neck: Supple. Trachea midline. Thyroid without enlargement/tenderness/nodules. No carotid bruit. No JVD. Short and wide. Flexed and chin resting on her anterior chest with wheezing.   Cardiovascular: Regular rate and rhythm. S1 S2 normal. III/VI systolic murmur  Respiratory: Respiratory distress. Severe course breath sounds and wheeze. Kyphosis. Poor chest wall excursion  Abdomen: Soft. Non-tender. Non-distended. No organomegaly. No masses. Normal bowel sounds. Obese. PEG.  Extremities: Warm to touch. No clubbing or cyanosis. No pedal edema. Large legs. Left leg contracted under the right leg  Pulses: 2+ peripheral pulses all extremities.	  Skin: Normal skin color. No rashes or lesions. No ecchymoses. No cyanosis. Warm to touch.  Lymph Nodes: Cervical, supraclavicular and axillary nodes normal  Neurological: Left lower extremity plegia with contraction. Left upper extremity is quite weak. A and O x 3    LABS:                          9.7    20.10 )-----------( 337      ( 19 Sep 2022 10:20 )             32.4     CBC    WBC  20.10 <==, 9.55 <==, 9.07 <==, 5.91 <==, 4.87 <==    Hemoglobin  9.7 <<==, 8.1 <<==, 7.9 <<==, 7.9 <<==, 8.2 <<==    Hematocrit  32.4 <==, 27.0 <==, 27.2 <==, 27.1 <==, 27.9 <==    Platelets  337 <==, 298 <==, 312 <==, 318 <==, 308 <==      140  |  101  |  42<H>  ----------------------------<  233<H>    09-19  5.0   |  26  |  0.66      LYTES    sodium  140 <==, 135 <==, 136 <==, 136 <==, 136 <==, 136 <==    potassium   5.0 <==, 4.7 <==, 4.5 <==, 5.1 <==, 4.5 <==, 4.4 <==    chloride  101 <==, 98 <==, 99 <==, 98 <==, 96 <==, 99 <==    carbon dioxide  26 <==, 24 <==, 29 <==, 27 <==, 24 <==, 26 <==    =============================================================================================  RENAL FUNCTION:    Creatinine:   0.66  <<==, 0.57  <<==, 0.59  <<==, 0.59  <<==, 0.68  <<==, 0.50  <<==    BUN:   42 <==, 32 <==, 33 <==, 34 <==, 34 <==, 22 <==    ============================================================================================    calcium   9.3 <==, 9.1 <==, 9.2 <==, 9.0 <==, 9.2 <==, 9.2 <==    ============================================================================================  LFTs    AST:   19 <== , 11 <== , 22 <==     ALT:  17  <== , 12  <== , 11  <==     AP:  230  <=, 204  <=, 158  <=    Bili:  0.4  <=, 0.2  <=, 0.2  <=    Venous Blood Gas:  09-19 @ 09:30  7.34/53/50/29/80.0  VBG Lactate: 3.5    Venous Blood Gas:  09-18 @ 03:50  7.39/48/47/29/78.2  VBG Lactate: 1.7    Procalcitonin, Serum: 0.23 ng/mL (09-18 @ 09:59)    < from: TTE with Doppler (w/Cont) (01.21.22 @ 14:08) >    Patient name: FRANKI MEHTA  YOB: 1935   Age: 86 (F)   MR#: [id number]  Study Date: 1/21/2022  ------------------------------------------------------------------------  Dimensions:    Normal Values:  LA:     2.5    2.0 - 4.0 cm  Ao:     3.3    2.0 - 3.8 cm  SEPTUM: 1.6    0.6 - 1.2 cm  PWT:    0.9    0.6 - 1.1 cm  LVIDd:  3.6    3.0 - 5.6 cm  LVIDs:  2.5    1.8 - 4.0 cm  Derived variables:  LVMI: 77 g/m2  RWT: 0.50  Fractional short: 31 %  EF (Visual Estimate): 70-75 %  ------------------------------------------------------------------------  Conclusions:  1. Concentric left ventricular hypertrophy.  2. Hyperdynamic left ventricular systolic function.  Endocardial visualization enhanced with intravenous  injection of Ultrasonic Enhancing Agent (Definity).  3. The right ventricle is not well visualized; grossly  normal right ventricular systolic function.  Pt refused to proceed with exam.  Limited Doppler study.  No trans aortic gradients.  Unable to rule out endocarditis.  ------------------------------------------------------------------------  Confirmed on 1/21/2022 - 15:42:57 by Kole Mcfarland M.D.  FASE  ------------------------------------------------------------------------  ---------------------------------------------------------------------------------------------------------------      MICROBIOLOGY:     Respiratory Viral Panel with COVID-19 by RYAN (09.08.22 @ 21:38)   Rapid RVP Result: [facility]   SARS-CoV-2: [facility]  This Respiratory Panel uses polymerase chain reaction (PCR) to detect for   adenovirus; coronavirus (HKU1, NL63, 229E, OC43); human metapneumovirus   (hMPV); human enterovirus/rhinovirus (Entero/RV); influenza A; influenza   A/H1; influenza A/H3; influenza A/H1-2009; influenza B; parainfluenza   viruses 1, 2, 3, 4; respiratory syncytial virus; Mycoplasma pneumoniae;   Chlamydophila pneumoniae; and SARS-CoV-2.     Culture - Blood (09.08.22 @ 20:39)   Specimen Source: .Blood Blood-Peripheral   Culture Results:   No growth to date.     Culture - Blood (09.08.22 @ 20:25)   Specimen Source: .Blood Blood-Peripheral   Culture Results:   No growth to date.     RADIOLOGY:  [x ] Chest radiographs reviewed and interpreted by me    EXAM:  XR CHEST PORTABLE URGENT 1V                          PROCEDURE DATE:  09/12/2022      FINDINGS:    Gastrostomy tube.  The heart is enlarged.  Patchy left mid and lower lung opacity is minimally increased. The right   lung is clear.  No pleural effusion or pneumothorax.    IMPRESSION:  Minimally increased left mid to lower lung atelectasis..    ROBSON PIZANO MD; Resident Radiology  This document has been electronically signed.  DIANE CLARK MD; Attending Radiologist  This document has been electronically signed. Sep 12 2022  2:46PM  --------------------------------------------------------------------------------------------------------------  EXAM:  CT CHEST                          PROCEDURE DATE:  09/14/2022      FINDINGS:    LUNGS AND AIRWAYS: Patent central airways.  Subsegmental atelectasis is   seen in the bilateral upper and lower lobes.  PLEURA: No pleural effusion.  MEDIASTINUM AND GIOVANA: No lymphadenopathy.  VESSELS: Calcifications of the aorta, aortic valve and coronary arteries.  HEART: Heart size is normal. No pericardial effusion.  CHEST WALL AND LOWER NECK: Within normal limits.  VISUALIZED UPPER ABDOMEN: Cholelithiasis.  BONES: Multiple lytic bone lesions are again seen including a destructive   lesion at the level of the T5 vertebral body that may extend into the   left neural foramen (3:42), unchanged from prior CT chest.    IMPRESSION:  Subsegmental atelectasis in the bilateral upper and lower lobes.    Lytic bone lesions as described above, unchanged from prior CT chest   8/29/2022. Recommend MR thoracic spine for further evaluation.     ALEXANDRIA COLLINS MD; Resident Radiologist  This document has been electronically signed.  HAY IRVIN MD; Attending Radiologist  This document has been electronically signed. Sep 14 2022  3:23PM  ---------------------------------------------------------------------------------------------------------------  EXAM:  DUPLEX SCAN EXT VEINS LOWER BI                          PROCEDURE DATE:  09/10/2022      IMPRESSION:  Limited study  No evidence of deep venous thrombosis in either lower extremity.  Limited visualization of the calf veins.    KEYLA ROMERO MD; Attending Radiologist  This document has been electronically signed. Sep 10 2022 10:42AM  ---------------------------------------------------------------------------------------------------------------  EXAM:  CT ABDOMEN AND PELVIS IC                          PROCEDURE DATE:  08/31/2022      IMPRESSION:    Multiple lytic lesions are seen throughout the axial and appendicular   skeleton, some of which appear increased in size from CT abdomen pelvis   3/16/2022 when evaluated in retrospect. A few lytic lesions involve the   left intertrochanteric region and right acetabulum.    Endometrial thickening. Recommend dedicated pelvic sonogram.    Cystic lesions are seen within the pancreas. Recommend a dedicated MRI on   a nonemergent basis.    SULY KENDRICK MD; Resident Radiology  This document has been electronically signed.  BRITTANI MALCOLM MD; Attending Radiologist  This document has been electronically signed. Sep  1 2022 11:01AM  ---------------------------------------------------------------------------------------------------------------  EXAM:  XR HUMERUS MIN 2 VIEWS RT                          PROCEDURE DATE:  08/30/2022      EXAM:  Internal/external rotation AP right humerus from 8/30/2022 at 0927.   Compared to appearance on previous day chest CT.    IMPRESSION:  Generalized osteopenia. Redemonstrated focal lytic lesion in proximal   medial humeral diaphysis with small area of permeative cortical   destruction. No additional radiographically appreciated suspicious lytic   or blastic lesions in remaining imaged regions.    No current fractures or dislocations.    Preserved shoulder and elbow joint spaces.    IV cannula with attached tubing overlies upper arm.    MINE NICE MD; Attending Radiologist  This document has been electronically signed. Aug 30 2022 10:39AM  ---------------------------------------------------------------------------------------------------------------  EXAM:  DUPLEX EXT VEINS UPPER LT                          PROCEDURE DATE:  09/01/2022      IMPRESSION:  No evidence of left upper extremity deep venous thrombosis.    FAWN FITZGERALD MD; Attending Radiologist  This document has been electronically signed. Sep  1 2022  1:10PM  ---------------------------------------------------------------------------------------------------------------  EXAM:  DUPLEX SCAN EXT VEINS LOWER BI                          PROCEDURE DATE:  08/31/2022      IMPRESSION:  No evidence of deep venous thrombosis in either lower extremity.    KEYLA ROMERO MD; Attending Radiologist  This document has been electronically signed. Aug 31 2022  7:59PM  ---------------------------------------------------------------------------

## 2022-09-19 NOTE — CHART NOTE - NSCHARTNOTEFT_GEN_A_CORE
Called by RN to evaluate patient for tachypnea RR 30s and increased work of breathing .   She was seen and examined at bedside. Noted RR 30, accessory muscle use, wheezing   A & O X3, following commands  vital signs Called by RN to evaluate patient for tachypnea RR 30s and increased work of breathing .   She was seen and examined at bedside with pulmonary Dr. Marcano. Noted RR 30, accessory muscle use, +wheezing B/L   vital signs 97.9 - 111- 30 /57 O2 sat 98% on 4 LPM NC O2   Dr. Marcano recommended to restart AVAP, CXR and duoneb nebulizer   Continue to monitor respiratory status.   Will follow up with CXR results   Will hold tube feeding while on AVAP     Maame Pérez NP-C  #[id number]

## 2022-09-20 NOTE — PROGRESS NOTE ADULT - SUBJECTIVE AND OBJECTIVE BOX
FRANKI MEHTA 87y MRN-[id number]    Patient is a 87y old  Female who presents with a chief complaint of SOB (20 Sep 2022 08:56)      Follow Up/CC:  ID following for COVID    Interval History/ROS: on BIPAP, lethargic, no fever    Allergies    Toradol (Urticaria (Mild to Mod); Rash (Mild to Mod))    Intolerances        ANTIMICROBIALS:  remdesivir  IVPB    remdesivir  IVPB 100 every 24 hours      MEDICATIONS  (STANDING):  albuterol/ipratropium for Nebulization 3 milliLiter(s) Nebulizer <User Schedule>  allopurinol 100 milliGRAM(s) Oral daily  anastrozole 1 milliGRAM(s) Oral daily  atorvastatin 20 milliGRAM(s) Oral at bedtime  bisacodyl 5 milliGRAM(s) Oral at bedtime  buDESOnide    Inhalation Suspension 0.5 milliGRAM(s) Inhalation every 12 hours  chlorhexidine 2% Cloths 1 Application(s) Topical <User Schedule>  diltiazem    Tablet 30 milliGRAM(s) Oral three times a day  guaiFENesin Oral Liquid (Sugar-Free) 300 milliGRAM(s) Oral every 6 hours  insulin lispro (ADMELOG) corrective regimen sliding scale   SubCutaneous every 6 hours  levETIRAcetam  Solution 750 milliGRAM(s) Oral two times a day  levothyroxine 75 MICROGram(s) Oral daily  melatonin 3 milliGRAM(s) Oral at bedtime  methylPREDNISolone sodium succinate Injectable 20 milliGRAM(s) IV Push every 6 hours  remdesivir  IVPB   IV Intermittent   remdesivir  IVPB 100 milliGRAM(s) IV Intermittent every 24 hours  rivaroxaban 10 milliGRAM(s) Oral daily  senna 2 Tablet(s) Oral at bedtime    MEDICATIONS  (PRN):  acetaminophen     Tablet .. 650 milliGRAM(s) Oral every 6 hours PRN Temp greater or equal to 38C (100.4F), Mild Pain (1 - 3)  ALPRAZolam 0.25 milliGRAM(s) Oral every 8 hours PRN severe anxiety  polyethylene glycol 3350 17 Gram(s) Oral daily PRN Constipation  traMADol 50 milliGRAM(s) Oral two times a day PRN Severe Pain (7 - 10)        Vital Signs Last 24 Hrs  T(C): 36.6 (20 Sep 2022 11:15), Max: 36.6 (20 Sep 2022 11:15)  T(F): 97.9 (20 Sep 2022 11:15), Max: 97.9 (20 Sep 2022 11:15)  HR: 84 (20 Sep 2022 11:15) (78 - 111)  BP: 106/61 (20 Sep 2022 11:15) (106/61 - 110/68)  BP(mean): --  RR: 28 (20 Sep 2022 11:15) (26 - 28)  SpO2: 97% (20 Sep 2022 11:15) (92% - 100%)    Parameters below as of 20 Sep 2022 11:15  Patient On (Oxygen Delivery Method): BiPAP/CPAP        CBC Full  -  ( 19 Sep 2022 10:20 )  WBC Count : 20.10 K/uL  RBC Count : 3.32 M/uL  Hemoglobin : 9.7 g/dL  Hematocrit : 32.4 %  Platelet Count - Automated : 337 K/uL  Mean Cell Volume : 97.6 fl  Mean Cell Hemoglobin : 29.2 pg  Mean Cell Hemoglobin Concentration : 29.9 gm/dL  Auto Neutrophil # : x  Auto Lymphocyte # : x  Auto Monocyte # : x  Auto Eosinophil # : x  Auto Basophil # : x  Auto Neutrophil % : x  Auto Lymphocyte % : x  Auto Monocyte % : x  Auto Eosinophil % : x  Auto Basophil % : x    09-19    140  |  101  |  42<H>  ----------------------------<  233<H>  5.0   |  26  |  0.66    Ca    9.3      19 Sep 2022 10:20    TPro  7.1  /  Alb  3.9  /  TBili  0.4  /  DBili  x   /  AST  19  /  ALT  17  /  AlkPhos  230<H>  09-19    LIVER FUNCTIONS - ( 19 Sep 2022 10:20 )  Alb: 3.9 g/dL / Pro: 7.1 g/dL / ALK PHOS: 230 U/L / ALT: 17 U/L / AST: 19 U/L / GGT: x               MICROBIOLOGY:  .Blood Blood-Peripheral  09-08-22   No Growth Final  --  --      .Blood Blood-Peripheral  09-08-22   No Growth Final  --  --      Clean Catch Clean Catch (Midstream)  08-27-22   >100,000 CFU/ml Enterococcus faecium  <10,000 CFU/ml Normal Urogenital ck present  --  Enterococcus faecium      .Blood Blood-Peripheral  08-27-22   No Growth Final  --  --      .Blood Blood-Peripheral  08-27-22   No Growth Final  --  --              v            RADIOLOGY

## 2022-09-20 NOTE — CHART NOTE - NSCHARTNOTEFT_GEN_A_CORE
Nutrition Follow Up Note  Patient seen for: TF follow up   Chart reviewed, events noted.    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 Very limited hx obtained from pt given mental status, dyspnea, and use of BIPAP. Unable to reach sonSy. History obtained from staff/chart review."    Source: [] Patient       [x] EMR        [x] RN        [] Family at bedside       [] Other:    -If unable to interview patient: [] Trach/Vent/BiPAP  [x] Disoriented/confused/inappropriate to interview    Diet Order:   Diet, NPO with Tube Feed:   Tube Feeding Modality: Gastrostomy  Jevity 1.5 Naseem (JEVITY1.5RTH)  Total Volume for 24 Hours (mL): 1260  Continuous  Starting Tube Feed Rate {mL per Hour}: 10  Increase Tube Feed Rate by (mL): 10     Every 4 hours  Until Goal Tube Feed Rate (mL per Hour): 70  Tube Feed Duration (in Hours): 18  Tube Feed Start Time: 06:00 (09-09-22)    - Is current order appropriate/adequate? [] Yes  []  No:     - PO intake :   [] >75%  Adequate    [] 50-75%  Fair       [] <50%  Poor  Per RN, and RD observed, pt tolerating TF at goal; at goal TF provides: 1260 ml, 1890 kcal (20 kcal/kg), 80 g/pro (0.9 g/pro/kg), 958 ml free water. defer water flushes to team.  based on dosing wt    - Nutrition-related concerns:  - noted <H> (9/20); Team to provide/adjust insulin as needed to help maintain BG WNL     GI:  Last BM 9/19    Bowel Regimen? [x] Yes   [] No  soft stool 9/17 as per flowsheets   per RN 9/19 had normal consistency (stool)    Weights:   Daily N/A    Drug Dosing Weight  Height (cm): 162.6 (08 Sep 2022 20:27)  Weight (kg): 93.213 (09 Sep 2022 15:52)  BMI (kg/m2): 35.3 (09 Sep 2022 15:52)  BSA (m2): 1.98 (09 Sep 2022 15:52)    Nutritionally Pertinent MEDICATIONS  (STANDING):  allopurinol  anastrozole  atorvastatin  bisacodyl  diltiazem    Tablet  insulin lispro (ADMELOG) corrective regimen sliding scale  levothyroxine  methylPREDNISolone sodium succinate Injectable  remdesivir  IVPB  remdesivir  IVPB  senna    Pertinent Labs: 09-19 @ 10:20: Na 140, BUN 42<H>, Cr 0.66, <H>, K+ 5.0, Phos --, Mg --, Alk Phos 230<H>, ALT/SGPT 17, AST/SGOT 19, HbA1c --    Finger Sticks:  POCT Blood Glucose.: 117 mg/dL (09-20 @ 06:33)  POCT Blood Glucose.: 164 mg/dL (09-19 @ 23:49)  POCT Blood Glucose.: 150 mg/dL (09-19 @ 22:03)  POCT Blood Glucose.: 148 mg/dL (09-19 @ 16:52)  POCT Blood Glucose.: 228 mg/dL (09-19 @ 11:51)      Skin per nursing documentation: no noted pressure injuries as per flowsheets   Edema: generalized non pitting edema as per flowsheets     Estimated Needs:   [x] no change since previous assessment  [] recalculated:   1860-6463 kcal/day (20-25 kcal/kg) based on dosing wt 93.2 kg  60-72 g/pro (1-1.2 g/pro/kg) based on IBW +10%, 59.8 kg    Previous Nutrition Diagnosis: Swallowing Difficulty  Nutrition Diagnosis is: [x] ongoing  [] resolved [] not applicable     New Nutrition Diagnosis: [x] Not applicable    Nutrition Care Plan:  [x] In Progress- addressed with diet order, PO encouragement and nutritional supplements   [] Achieved  [] Not applicable    Nutrition Interventions:     Education Provided:       [] Yes:  [] No: education not warranted/appropriate at this time        Recommendations:      - Continue current TF regimen at goal, Jevity 1.5 at 70ml/hr x18 hr. Monitor GI tolerance. RD to remain available to adjust EN formulary, volume/rate PRN.   - <H>(9/20); Team to provide/adjust insulin as needed to help maintain BG WNL   - monitor bowel movement and adust bowel regimen as indicated, defer to team  - Nutrition care plan to remain consistent with pt goals of care      Monitoring and Evaluation:   Continue to monitor nutritional intake, tolerance to diet prescription, weights, labs, skin integrity      RD remains available upon request and will follow up per protocol  Paola Comer RD CDN #[id number] Nutrition Follow Up Note  Patient seen for: TF follow up   Chart reviewed, events noted.    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 Very limited hx obtained from pt given mental status, dyspnea, and use of BIPAP. Unable to reach sonSy. History obtained from staff/chart review."    Source: [] Patient       [x] EMR        [x] RN        [] Family at bedside       [] Other:    -If unable to interview patient: [] Trach/Vent/BiPAP  [x] Disoriented/confused/inappropriate to interview    Diet Order:   Diet, NPO with Tube Feed:   Tube Feeding Modality: Gastrostomy  Jevity 1.5 Naseem (JEVITY1.5RTH)  Total Volume for 24 Hours (mL): 1260  Continuous  Starting Tube Feed Rate {mL per Hour}: 10  Increase Tube Feed Rate by (mL): 10     Every 4 hours  Until Goal Tube Feed Rate (mL per Hour): 70  Tube Feed Duration (in Hours): 18  Tube Feed Start Time: 06:00 (09-09-22)    - Is current order appropriate/adequate? [] Yes  []  No:     - PO intake :   [] >75%  Adequate    [] 50-75%  Fair       [] <50%  Poor  Per RN, and RD observed, pt tolerating TF at goal; at goal TF provides: 1260 ml, 1890 kcal (20 kcal/kg), 80 g/pro (0.9 g/pro/kg), 958 ml free water. defer water flushes to team.  based on dosing wt    - Nutrition-related concerns:  - noted <H> (9/20); Team to provide/adjust insulin as needed to help maintain BG WNL     GI:  Last BM 9/19    Bowel Regimen? [x] Yes   [] No  soft stool 9/17 as per flowsheets   per RN 9/19 had normal consistency (stool)    Weights:   Daily N/A    Drug Dosing Weight  Height (cm): 162.6 (08 Sep 2022 20:27)  Weight (kg): 93.213 (09 Sep 2022 15:52)  BMI (kg/m2): 35.3 (09 Sep 2022 15:52)  BSA (m2): 1.98 (09 Sep 2022 15:52)    Nutritionally Pertinent MEDICATIONS  (STANDING):  allopurinol  anastrozole  atorvastatin  bisacodyl  diltiazem    Tablet  insulin lispro (ADMELOG) corrective regimen sliding scale  levothyroxine  methylPREDNISolone sodium succinate Injectable  remdesivir  IVPB  remdesivir  IVPB  senna    Pertinent Labs: 09-19 @ 10:20: Na 140, BUN 42<H>, Cr 0.66, <H>, K+ 5.0, Phos --, Mg --, Alk Phos 230<H>, ALT/SGPT 17, AST/SGOT 19, HbA1c --    Finger Sticks:  POCT Blood Glucose.: 117 mg/dL (09-20 @ 06:33)  POCT Blood Glucose.: 164 mg/dL (09-19 @ 23:49)  POCT Blood Glucose.: 150 mg/dL (09-19 @ 22:03)  POCT Blood Glucose.: 148 mg/dL (09-19 @ 16:52)  POCT Blood Glucose.: 228 mg/dL (09-19 @ 11:51)      Skin per nursing documentation: no noted pressure injuries as per flowsheets   Edema: generalized non pitting edema as per flowsheets     Estimated Needs:   [x] no change since previous assessment  [] recalculated:   4370-2139 kcal/day (20-25 kcal/kg) based on dosing wt 93.2 kg  60-72 g/pro (1-1.2 g/pro/kg) based on IBW +10%, 59.8 kg    Previous Nutrition Diagnosis: Swallowing Difficulty  Nutrition Diagnosis is: [x] ongoing  [] resolved [] not applicable     New Nutrition Diagnosis: [x] Not applicable    Nutrition Care Plan:  [x] In Progress- addressed with EN diet order  [] Achieved  [] Not applicable    Nutrition Interventions:     Education Provided:       [] Yes:  [] No: education not warranted/appropriate at this time        Recommendations:      - Continue current TF regimen at goal, Jevity 1.5 at 70ml/hr x18 hr. Monitor GI tolerance. RD to remain available to adjust EN formulary, volume/rate PRN.   - <H>(9/20); Team to provide/adjust insulin as needed to help maintain BG WNL   - monitor bowel movement and adust bowel regimen as indicated, defer to team  - Nutrition care plan to remain consistent with pt goals of care      Monitoring and Evaluation:   Continue to monitor nutritional intake, tolerance to diet prescription, weights, labs, skin integrity      RD remains available upon request and will follow up per protocol  Paola Comer RD CDN #[id number]

## 2022-09-20 NOTE — PROGRESS NOTE ADULT - ASSESSMENT
89    year old female    h/o hemorrhagic CVA, has  PEG,  HTN, MI,   HLD, gout, right ca  breast   right Ca breast. s/p mastectomy in 2019,  s/p  sentinel node  localization.  4/4,  were  negative  peg dislodged.  IR   replacements  on 1/3/22  s/p rrt  . in past,  for hypoxia. cxr with complete  collapsed  of  left lung, needing broch,   s/ p  bronchoscopy , pt  with  tracheomalacia  and  collapsed  airways,  makes  this a  difficult  situation, as there is no good rx for this     h/o bacteremia. on   pna, perforated  appendicitis,  ?  mets  to  acetabulum, was  seen by dr pacheco   surg/ IR  and  oncology eval dr pacheco   sa w pt.  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 this will not alter rx. pt  will need   extended  course  of ab   on iv  vanco and ertapenem.  till  2/9/.22  ID dr reagan, and per  surg  and  IR  eval,  no intervention   CT  1/20/22, no PE. collection in right side   s/p  rectal bleed.        *  admitted with   presumed  resp  failure/ pt was  sent back from [facility], the same day   pt seen in er.  appears   at her naseline.  no  wheezing /  atousable    ENT eval w/ laryngoscopy notable for vocal cords mobile and intact, no edema, upper airway patent     hypertonic saline for airway clearance   cxr, no pna     *   s/p cva, has  PEG,  npo  ,   on  synthroid, Keppra  ,  *   HTN, on  cardizem,  per  card dr jamison  *  h/o  Ca breast. /, s/p  R  mastectomy  *   obesity, bmi  was   41, now  is  30   *     c/c left  leg contracture ,  remains  bed  bound. functional  paraplegia,  needs  assistance  for  all her  adl's   *  pt  with  h/o  tracheomalacia  and  collapsed  airways,      given pt;s  mlple co morbidities,  pt is  at risk for  re admissions     on nocturnal  bipap    difficult  situation, a s there  is no  good  rx  for  pt's  recurrent lung collapse hypoxia    h/o  of  chronic    mild  tachycardia    re  admission  likely,, given her  airways, and  propensity  for  lung collapse      *   CT chest. lytic  lesions, T  spine/  ribs/ humerus/  oncoloigy  d r ohri.  suspect  metastatic ca breast        son. Sy, updated/ oncologist  has  also  discussed  with  son,  futility of  pursuing  bx. a s pt  is  not an ideal   candidate  for  chemo    CT  A.p ,/ prelim ,  pelvic  mets  per  oncology,   suspect  metastatic ca  breast    was  awaiting   mri  spine/  pt unable  to tolerate  mri   per oncology, no  further  intervention/  and on  Arimidex, now,   per d r ohri     no good  solution . given her mlple co morbidities   obesity,  bed bound.  functional paraplegia    per  son,   rehab saw pt  was a  bit anxious, and then promptly  returned   to er/ as they  did not  have  a  bipap machine /  care cortn to f/.p   needs  24/7  O2/ nocturnal bipap   difficult  situation,  bed  bound. obesity. c/c  hypercarbia, needing , prn /  nocturnal bipap/  with intermittent tachypnea    now  is  covid +. on iv steroid/ wheezing/ remdissivir/, seen by  house  ID     dvt ppx/  xarelto  for   30  days/  remains on iv steroid  pt  with  frequent episodes  of tachypnea. has  obstructive /restrictive   lung  disease ,  obesity/ RAQUEL/ hypercarbia  will  speak  again  with son regarding   dnr    [facility].  poor prognosis      per  son, pt is  full code

## 2022-09-20 NOTE — PROGRESS NOTE ADULT - SUBJECTIVE AND OBJECTIVE BOX
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; PEG in place    INTERVAL HISTORY: in airborne and contact isolation due to hospital acquired COVID infection; awake and alert on AVAPS since yesterday for increasing pCO2, WBC and lactate; marked shortness of breath and using her accessory muscles of respiration when briefly taken off NIV to get her medications thru her PEG tube; severe chest congestion and wheeze exacerbated by her chronic neck flexion; occasional cough productive of scant sputum; no fevers, chills or sweats; no chest pain/pressure or palpitations;     REVIEW OF SYSTEMS:  Constitutional: As per interval history  HEENT: Within normal limits  CV: As per interval history  Resp: As per interval history  GI: dysphagia  : Within normal limits  Musculoskeletal: Within normal limits  Skin: Within normal limits  Neurological: CVA - left sided plegia  Psychiatric: Within normal limits  Endocrine: Within normal limits  Hematologic/Lymphatic: Within normal limits  Allergic/Immunologic: Within normal limits    MEDICATIONS:     Pulmonary "  albuterol/ipratropium for Nebulization 3 milliLiter(s) Nebulizer <User Schedule>  buDESOnide    Inhalation Suspension 0.5 milliGRAM(s) Inhalation every 12 hours  guaiFENesin Oral Liquid (Sugar-Free) 300 milliGRAM(s) Oral every 6 hours    Anti-microbials:  remdesivir  IVPB   IV Intermittent   remdesivir  IVPB 100 milliGRAM(s) IV Intermittent every 24 hours    Cardiovascular:  diltiazem    Tablet 30 milliGRAM(s) Oral three times a day    Other:  allopurinol 100 milliGRAM(s) Oral daily  anastrozole 1 milliGRAM(s) Oral daily  atorvastatin 20 milliGRAM(s) Oral at bedtime  bisacodyl 5 milliGRAM(s) Oral at bedtime  chlorhexidine 2% Cloths 1 Application(s) Topical <User Schedule>  insulin lispro (ADMELOG) corrective regimen sliding scale   SubCutaneous every 6 hours  levETIRAcetam  Solution 750 milliGRAM(s) Oral two times a day  levothyroxine 75 MICROGram(s) Oral daily  melatonin 3 milliGRAM(s) Oral at bedtime  rivaroxaban 10 milliGRAM(s) Oral daily  senna 2 Tablet(s) Oral at bedtime    MEDICATIONS  (PRN):  acetaminophen     Tablet .. 650 milliGRAM(s) Oral every 6 hours PRN Temp greater or equal to 38C (100.4F), Mild Pain (1 - 3)  ALPRAZolam 0.25 milliGRAM(s) Oral every 8 hours PRN severe anxiety  polyethylene glycol 3350 17 Gram(s) Oral daily PRN Constipation  traMADol 50 milliGRAM(s) Oral two times a day PRN Severe Pain (7 - 10)        OBJECTIVE:    POCT Blood Glucose.: 117 mg/dL (20 Sep 2022 06:33)  POCT Blood Glucose.: 164 mg/dL (19 Sep 2022 23:49)  POCT Blood Glucose.: 150 mg/dL (19 Sep 2022 22:03)  POCT Blood Glucose.: 148 mg/dL (19 Sep 2022 16:52)  POCT Blood Glucose.: 228 mg/dL (19 Sep 2022 11:51)      PHYSICAL EXAM:       ICU Vital Signs Last 24 Hrs  T(C): 36.5 (19 Sep 2022 21:30), Max: 36.6 (19 Sep 2022 11:35)  T(F): 97.7 (19 Sep 2022 21:30), Max: 97.9 (19 Sep 2022 11:35)  HR: 79 (20 Sep 2022 04:11) (79 - 111)  BP: 110/68 (19 Sep 2022 21:30) (109/57 - 110/68)  BP(mean): --  ABP: --  ABP(mean): --  RR: 26 (19 Sep 2022 21:30) (22 - 36)  SpO2: 100% (20 Sep 2022 04:11) (92% - 100%) on FiO2 35% AVAPS    General: Awake. Alert. Cooperative. Using accessory muscles of respirations. Appears stated age. Obese. Bedbound.   HEENT: Atraumatic. Normocephalic. Anicteric. Normal oral mucosa. PERRL. EOMI. Mallampati class IV airway. Poor dentition.  Neck: Supple. Trachea midline. Thyroid without enlargement/tenderness/nodules. No carotid bruit. No JVD. Short and wide. Flexed and chin resting on her anterior chest with wheezing.   Cardiovascular: Regular rate and rhythm. S1 S2 normal. III/VI systolic murmur  Respiratory: In respiratory distress with abdominal and neck breathing. Severe course breath sounds and wheeze. Kyphosis. Poor chest wall excursion  Abdomen: Soft. Non-tender. Non-distended. No organomegaly. No masses. Normal bowel sounds. Obese. PEG.  Extremities: Warm to touch. No clubbing or cyanosis. No pedal edema. Large legs. Left leg contracted under the right leg  Pulses: 2+ peripheral pulses all extremities.	  Skin: Normal skin color. No rashes or lesions. No ecchymoses. No cyanosis. Warm to touch.  Lymph Nodes: Cervical, supraclavicular and axillary nodes normal  Neurological: Left lower extremity plegia with contraction. Left upper extremity is quite weak. A and O x 3    LABS:                          9.7    20.10 )-----------( 337      ( 19 Sep 2022 10:20 )             32.4     CBC    WBC  20.10 <==, 9.55 <==, 9.07 <==, 5.91 <==, 4.87 <==    Hemoglobin  9.7 <<==, 8.1 <<==, 7.9 <<==, 7.9 <<==, 8.2 <<==    Hematocrit  32.4 <==, 27.0 <==, 27.2 <==, 27.1 <==, 27.9 <==    Platelets  337 <==, 298 <==, 312 <==, 318 <==, 308 <==      140  |  101  |  42<H>  ----------------------------<  233<H>    09-19  5.0   |  26  |  0.66      LYTES    sodium  140 <==, 135 <==, 136 <==, 136 <==, 136 <==, 136 <==    potassium   5.0 <==, 4.7 <==, 4.5 <==, 5.1 <==, 4.5 <==, 4.4 <==    chloride  101 <==, 98 <==, 99 <==, 98 <==, 96 <==, 99 <==    carbon dioxide  26 <==, 24 <==, 29 <==, 27 <==, 24 <==, 26 <==    =============================================================================================  RENAL FUNCTION:    Creatinine:   0.66  <<==, 0.57  <<==, 0.59  <<==, 0.59  <<==, 0.68  <<==, 0.50  <<==    BUN:   42 <==, 32 <==, 33 <==, 34 <==, 34 <==, 22 <==    ============================================================================================    calcium   9.3 <==, 9.1 <==, 9.2 <==, 9.0 <==, 9.2 <==, 9.2 <==    ============================================================================================  LFTs    AST:   19 <== , 11 <== , 22 <==     ALT:  17  <== , 12  <== , 11  <==     AP:  230  <=, 204  <=, 158  <=    Bili:  0.4  <=, 0.2  <=, 0.2  <=    Venous Blood Gas:  09-19 @ 09:30  7.34/53/50/29/80.0  VBG Lactate: 3.5    Procalcitonin, Serum: 0.23 ng/mL (09-18 @ 09:59)    Venous Blood Gas:  09-18 @ 03:50  7.39/48/47/29/78.2  VBG Lactate: 1.7    Procalcitonin, Serum: 0.23 ng/mL (09-18 @ 09:59)    D-Dimer Assay, Quantitative (09.18.22 @ 09:59)   D-Dimer Assay, Quantitative: 196:    C-Reactive Protein, Serum (09.18.22 @ 09:59)   C-Reactive Protein, Serum: 5 mg/L     Ferritin, Serum in AM (09.18.22 @ 09:59)   Ferritin, Serum: 63 ng/mL     < from: TTE with Doppler (w/Cont) (01.21.22 @ 14:08) >    Patient name: FRANKI MEHTA  YOB: 1935   Age: 86 (F)   MR#: [id number]  Study Date: 1/21/2022  ------------------------------------------------------------------------  Dimensions:    Normal Values:  LA:     2.5    2.0 - 4.0 cm  Ao:     3.3    2.0 - 3.8 cm  SEPTUM: 1.6    0.6 - 1.2 cm  PWT:    0.9    0.6 - 1.1 cm  LVIDd:  3.6    3.0 - 5.6 cm  LVIDs:  2.5    1.8 - 4.0 cm  Derived variables:  LVMI: 77 g/m2  RWT: 0.50  Fractional short: 31 %  EF (Visual Estimate): 70-75 %  ------------------------------------------------------------------------  Conclusions:  1. Concentric left ventricular hypertrophy.  2. Hyperdynamic left ventricular systolic function.  Endocardial visualization enhanced with intravenous  injection of Ultrasonic Enhancing Agent (Definity).  3. The right ventricle is not well visualized; grossly  normal right ventricular systolic function.  Pt refused to proceed with exam.  Limited Doppler study.  No trans aortic gradients.  Unable to rule out endocarditis.  ------------------------------------------------------------------------  Confirmed on 1/21/2022 - 15:42:57 by COMPA Castillo  ------------------------------------------------------------------------  ---------------------------------------------------------------------------------------------------------------      MICROBIOLOGY:     COVID-19 PCR . (09.19.22 @ 18:31)   COVID-19 PCR: Detected:    Respiratory Viral Panel with COVID-19 by RYAN (09.08.22 @ 21:38)   Rapid RVP Result: [facility]   SARS-CoV-2: [facility]  This Respiratory Panel uses polymerase chain reaction (PCR) to detect for   adenovirus; coronavirus (HKU1, NL63, 229E, OC43); human metapneumovirus   (hMPV); human enterovirus/rhinovirus (Entero/RV); influenza A; influenza   A/H1; influenza A/H3; influenza A/H1-2009; influenza B; parainfluenza   viruses 1, 2, 3, 4; respiratory syncytial virus; Mycoplasma pneumoniae;   Chlamydophila pneumoniae; and SARS-CoV-2.     Culture - Blood (09.08.22 @ 20:39)   Specimen Source: .Blood Blood-Peripheral   Culture Results:   No growth to date.     Culture - Blood (09.08.22 @ 20:25)   Specimen Source: .Blood Blood-Peripheral   Culture Results:   No growth to date.     RADIOLOGY:  [x ] Chest radiographs reviewed and interpreted by me    EXAM:  XR CHEST PORTABLE URGENT 1V                          PROCEDURE DATE:  09/19/2022      FINDINGS:  Radiographic interpretation is limited by patient positioning and   overlying artifact.  Gastrostomy tube is noted.  Left humeral fixation plate and screws are noted.  The heart size cannot be accurately assessed in this projection.  Left basilar hazy opacities  Low lung volumes bilaterally.  There is no pneumothorax or large pleural effusion.  No acute bony abnormality.    IMPRESSION:  Left basilar hazy opacity may represent atelectasis.    AMY JARAMILLO MD; Resident Radiologist  This document has been electronically signed.  DIANE CLARK MD; Attending Radiologist  This document has been electronically signed. Sep 19 2022  6:08PM  ---------------------------------------------------------------------------------------------------------------    EXAM:  XR CHEST PORTABLE URGENT 1V                          PROCEDURE DATE:  09/12/2022      FINDINGS:    Gastrostomy tube.  The heart is enlarged.  Patchy left mid and lower lung opacity is minimally increased. The right   lung is clear.  No pleural effusion or pneumothorax.    IMPRESSION:  Minimally increased left mid to lower lung atelectasis..    ROBSON PIZANO MD; Resident Radiology  This document has been electronically signed.  DIANE CLARK MD; Attending Radiologist  This document has been electronically signed. Sep 12 2022  2:46PM  --------------------------------------------------------------------------------------------------------------  EXAM:  CT CHEST                          PROCEDURE DATE:  09/14/2022      FINDINGS:    LUNGS AND AIRWAYS: Patent central airways.  Subsegmental atelectasis is   seen in the bilateral upper and lower lobes.  PLEURA: No pleural effusion.  MEDIASTINUM AND GIOVANA: No lymphadenopathy.  VESSELS: Calcifications of the aorta, aortic valve and coronary arteries.  HEART: Heart size is normal. No pericardial effusion.  CHEST WALL AND LOWER NECK: Within normal limits.  VISUALIZED UPPER ABDOMEN: Cholelithiasis.  BONES: Multiple lytic bone lesions are again seen including a destructive   lesion at the level of the T5 vertebral body that may extend into the   left neural foramen (3:42), unchanged from prior CT chest.    IMPRESSION:  Subsegmental atelectasis in the bilateral upper and lower lobes.    Lytic bone lesions as described above, unchanged from prior CT chest   8/29/2022. Recommend MR thoracic spine for further evaluation.     ALEXANDRIA COLLINS MD; Resident Radiologist  This document has been electronically signed.  HAY IRVIN MD; Attending Radiologist  This document has been electronically signed. Sep 14 2022  3:23PM  ---------------------------------------------------------------------------------------------------------------  EXAM:  DUPLEX SCAN EXT VEINS LOWER BI                          PROCEDURE DATE:  09/10/2022      IMPRESSION:  Limited study  No evidence of deep venous thrombosis in either lower extremity.  Limited visualization of the calf veins.    KEYLA ROMERO MD; Attending Radiologist  This document has been electronically signed. Sep 10 2022 10:42AM  ---------------------------------------------------------------------------------------------------------------  EXAM:  CT ABDOMEN AND PELVIS IC                          PROCEDURE DATE:  08/31/2022      IMPRESSION:    Multiple lytic lesions are seen throughout the axial and appendicular   skeleton, some of which appear increased in size from CT abdomen pelvis   3/16/2022 when evaluated in retrospect. A few lytic lesions involve the   left intertrochanteric region and right acetabulum.    Endometrial thickening. Recommend dedicated pelvic sonogram.    Cystic lesions are seen within the pancreas. Recommend a dedicated MRI on   a nonemergent basis.    SULY KENDRICK MD; Resident Radiology  This document has been electronically signed.  BRITTANI MALCOLM MD; Attending Radiologist  This document has been electronically signed. Sep  1 2022 11:01AM  ---------------------------------------------------------------------------------------------------------------  EXAM:  XR HUMERUS MIN 2 VIEWS RT                          PROCEDURE DATE:  08/30/2022      EXAM:  Internal/external rotation AP right humerus from 8/30/2022 at 0927.   Compared to appearance on previous day chest CT.    IMPRESSION:  Generalized osteopenia. Redemonstrated focal lytic lesion in proximal   medial humeral diaphysis with small area of permeative cortical   destruction. No additional radiographically appreciated suspicious lytic   or blastic lesions in remaining imaged regions.    No current fractures or dislocations.    Preserved shoulder and elbow joint spaces.    IV cannula with attached tubing overlies upper arm.    MINE NICE MD; Attending Radiologist  This document has been electronically signed. Aug 30 2022 10:39AM  ---------------------------------------------------------------------------------------------------------------  EXAM:  DUPLEX EXT VEINS UPPER LT                          PROCEDURE DATE:  09/01/2022      IMPRESSION:  No evidence of left upper extremity deep venous thrombosis.    FAWN FITZGERALD MD; Attending Radiologist  This document has been electronically signed. Sep  1 2022  1:10PM  ---------------------------------------------------------------------------------------------------------------  EXAM:  DUPLEX SCAN EXT VEINS LOWER BI                          PROCEDURE DATE:  08/31/2022      IMPRESSION:  No evidence of deep venous thrombosis in either lower extremity.    KEYLA ROMERO MD; Attending Radiologist  This document has been electronically signed. Aug 31 2022  7:59PM  ---------------------------------------------------------------------------

## 2022-09-20 NOTE — PROGRESS NOTE ADULT - ASSESSMENT
Mr. 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  Pt was admitted for management of Acute on Chronic respiratory failure 2/2 severe tracheomalacia and bronchospasm. Pt was managed with NIV and steroids. Pt was tested for DC planning yesterday and found to be COVID positive and ID consulted for the same.    WORKUP  COVID-19 PCR: Detected (09-16-22 @ 18:18)  COVID-19 PCR: NotDetec (09-13-22 @ 08:24)    DIAGNOSIS and IMPRESSION  ·	Nosocomial COVID 19  ·	Acute on Chronic respiratory failure 2/2 severe tracheomalacia and bronchospasm.  ·	metastatic breast cancer to bones s/p mastectomy (pt was unable to tolerate further cancer workup of spine lesions).     Pt started on remdesevir (9/17)  Pt with stable O2 requirements 3 L during day and BiPAP at night  Pt was transitioned from her prednisone taper to solumedrol 20 IV Q6 by pulm.    RECOMMENDATIONS  Cont remdesevir   On methylprednisolone per pulm  Continue supplemental O2 and supportive care per primary team  Continue Contact and Airborne Isolation precautions    John Fragoso  Attending Physician   Division of Infectious Disease  Office #[phone]  Available on Microsoft Teams also  After 5pm/weekend or no response, call #[phone]

## 2022-09-20 NOTE — PROGRESS NOTE ADULT - ASSESSMENT
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 She also has a history of HTN, HLD, CAD s/p MI with preserved LVEF and moderate aortic stenosis. The patient was admitted to [facility] in December 2021 with fever and abdominal pain due to perforated appendicitis. She was treated with tube drainage and antibiotics for a polymicrobial infection. Hospital course was complicated by respiratory failure due to mucous plugging with complete collapse of the left lung. She underwent several bronchoscopies for pulmonary toilet and was found to have severe tracheobronchomalacia. Her respiratory status has remained "stable" while at [facility] on nebulized bronchodilators and hypertonic saline and without nocturnal NIV. She was admitted in March with hematochezia. The left lower lobe was well aerated on a CT scan of the abdomen and pelvis with only bibasilar subsegmental atelectasis - there was scattered sigmoid diverticulosis without evidence of diverticulitis - interval decrease in size of a fluid collection in the region of the previously perforated appendicitis measuring 2.8 x 1.5 cm previously measuring 4.0 x 2.2 cm - slight increase in mild adjacent inflammatory change. The GI bleed was treated conservatively.  She was admitted on 8/29 with shortness of breath in the setting of back, neck and left lower extremity pain. She required NIV for mild acute hypercapnic respiratory failure that quickly resolved. Her respiratory status remained stable on room air and on her usual nebs and mucolytics. She was started on a puree diet with moderately thickened fluids in addition to PEG feeds following evaluation by the speech and swallow team. She was started on anastrazole for likely metastatic breast cancer to the bone. On the day of discharge, the patient was quite anxious with shortness of breath, chest congestion and wheeze - she was making a grunting noise with expiration. She was returned from [facility] that evening. Currently she is tachypneic and using accessory muscles of respiration. She has an audible wheeze and cough productive of scant sputum. No fevers, chills or sweats. No chest pain/pressure or palpitations.     the patient has new onset bronchospasm with increased work of breathing and worsening left lower lobe atelectasis -  she has severe tracheobronchomalacia that has resulted in mucous plugging with complete left lung collapse requiring several bronchoscopies for pulmonary toilet in the past - the patient was felt not to be a candidate for surgery for the tracheobronchomalacia and stenting was felt to have more risk than benefit     metastatic breast cancer    9/19 -  found to be COVID positive on discharge [facility]; awake and alert; now with marked worsening of shortness of breath and using her accessory muscles of respiration; severe chest congestion and wheeze exacerbated by neck flexion; increasing pCO2, WBC and lactate; occasional cough productive of scant sputum; no fevers, chills or sweats; no chest pain/pressure or palpitations; back on PEG feeds; switched from nebs to inhalers yesterday as Dr. Dexter was informed by nursing that COVID positive patients cannot receive nebs at [facility];      PLAN/RECOMMENDATIONS:    continue AVAPS -> take off for one hour after bolus feeding  VBG 9/19 -> 7.34/53/50/29/80.0 - worsening respiratory acidosis with lactate 3.5 -> repeat  repeat CXR 9/19 -> left basilar hazy opacities c/w atelectasis - low lung volumes bilaterally  remdesivir x 5 days - following renal and liver function  solumedrol 20mg IV q8h  albuterol/atrovent nebs q4h  pulmicort 0.5mg nebs q12h - give after duoneb - rinse mouth after use  guaifenesin 300mg 4 times daily via PEG  incentive spirometry   acapella device   follow inflammatory markers - CRP - ferritin - d-dimer  CT scan 8/31 -> multiple lytic lesions are seen throughout the axial and appendicular skeleton, some of which appear increased in size from CT abdomen pelvis 3/16/2022 when evaluated in retrospect - a few lytic lesions involve the left intertrochanteric region and right acetabulum  oncology follow-up noted    s/p right simple mastectomy 4/2019 for pTqcpN0 breast cancer but did not receive adjuvant hormonal therapy    it is likely that the patient has metastatic breast cancer    started on anastrozole as the patient could not tolerate a MRI and is not a candidate for bone biopsy  treatment of HTN, HLD, CAD, aortic stenosis per cardiology  DVT prophylaxis - xarelto  glucose control  analgesics    Will follow with you. Plan of care discussed with the patient and her RN at bedside and the dedicated floor NP.     Kennedy Marcano MD, [facility]  [phone]  Pulmonary Medicine   ASSESSMENT:     87 year old gentlewoman, lifelong non-smoker, well known to me without history of intrinsic lung disease. 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 She also has a history of HTN, HLD, CAD s/p MI with preserved LVEF and moderate aortic stenosis. The patient was admitted to [facility] in December 2021 with fever and abdominal pain due to perforated appendicitis. She was treated with tube drainage and antibiotics for a polymicrobial infection. Hospital course was complicated by respiratory failure due to mucous plugging with complete collapse of the left lung. She underwent several bronchoscopies for pulmonary toilet and was found to have severe tracheobronchomalacia. Her respiratory status has remained "stable" while at [facility] on nebulized bronchodilators and hypertonic saline and without nocturnal NIV. She was admitted in March with hematochezia. The left lower lobe was well aerated on a CT scan of the abdomen and pelvis with only bibasilar subsegmental atelectasis - there was scattered sigmoid diverticulosis without evidence of diverticulitis - interval decrease in size of a fluid collection in the region of the previously perforated appendicitis measuring 2.8 x 1.5 cm previously measuring 4.0 x 2.2 cm - slight increase in mild adjacent inflammatory change. The GI bleed was treated conservatively.  She was admitted on 8/29 with shortness of breath in the setting of back, neck and left lower extremity pain. She required NIV for mild acute hypercapnic respiratory failure that quickly resolved. Her respiratory status remained stable on room air and on her usual nebs and mucolytics. She was started on a puree diet with moderately thickened fluids in addition to PEG feeds following evaluation by the speech and swallow team. She was started on anastrazole for likely metastatic breast cancer to the bone. On the day of discharge, the patient was quite anxious with shortness of breath, chest congestion and wheeze - she was making a grunting noise with expiration. She was returned from [facility] that evening. Currently she is tachypneic and using accessory muscles of respiration. She has an audible wheeze and cough productive of scant sputum. No fevers, chills or sweats. No chest pain/pressure or palpitations.     the patient has new onset bronchospasm with increased work of breathing and worsening left lower lobe atelectasis -  she has severe tracheobronchomalacia that has resulted in mucous plugging with complete left lung collapse requiring several bronchoscopies for pulmonary toilet in the past - the patient was felt not to be a candidate for surgery for the tracheobronchomalacia and stenting was felt to have more risk than benefit     metastatic breast cancer    9/19 -  found to be COVID positive on discharge [facility]; awake and alert; now with marked worsening of shortness of breath and using her accessory muscles of respiration; severe chest congestion and wheeze exacerbated by neck flexion; increasing pCO2, WBC and lactate; occasional cough productive of scant sputum; no fevers, chills or sweats; no chest pain/pressure or palpitations; back on PEG feeds; switched from nebs to inhalers yesterday as Dr. Dexter was informed by nursing that COVID positive patients cannot receive nebs at [facility];      PLAN/RECOMMENDATIONS:    continue AVAPS -> take off for one hour after bolus feeding  VBG 9/19 -> 7.34/53/50/29/80.0 - worsening respiratory acidosis with lactate 3.5 -> repeat  repeat CXR 9/19 -> left basilar hazy opacities c/w atelectasis - low lung volumes bilaterally  remdesivir x 5 days - following renal and liver function  solumedrol 20mg IV q8h  albuterol/atrovent nebs q4h  pulmicort 0.5mg nebs q12h - give after duoneb - rinse mouth after use  guaifenesin 300mg 4 times daily via PEG  incentive spirometry   acapella device   follow inflammatory markers - CRP - ferritin - d-dimer -> all within normal limits  CT scan 8/31 -> multiple lytic lesions are seen throughout the axial and appendicular skeleton, some of which appear increased in size from CT abdomen pelvis 3/16/2022 when evaluated in retrospect - a few lytic lesions involve the left intertrochanteric region and right acetabulum  oncology follow-up noted    s/p right simple mastectomy 4/2019 for pTqcpN0 breast cancer but did not receive adjuvant hormonal therapy    it is likely that the patient has metastatic breast cancer    started on anastrozole as the patient could not tolerate a MRI and is not a candidate for bone biopsy  treatment of HTN, HLD, CAD, aortic stenosis per cardiology  DVT prophylaxis - xarelto  glucose control  analgesics    Will follow with you. Plan of care discussed with the patient and her RN at bedside and the dedicated floor NP.     Kennedy Marcano MD, [facility]  [phone]  Pulmonary Medicine

## 2022-09-20 NOTE — PROGRESS NOTE ADULT - SUBJECTIVE AND OBJECTIVE BOX
CARDIOLOGY     PROGRESS  NOTE   ________________________________________________    CHIEF COMPLAINT:Patient is a 87y old  Female who presents with a chief complaint of SOB (19 Sep 2022 07:48)  comfortable.  	  REVIEW OF SYSTEMS:  CONSTITUTIONAL: No fever, weight loss, or fatigue  EYES: No eye pain, visual disturbances, or discharge  ENT:  No difficulty hearing, tinnitus, vertigo; No sinus or throat pain  NECK: No pain or stiffness  RESPIRATORY: No cough, wheezing, chills or hemoptysis; decrease  Shortness of Breath  CARDIOVASCULAR: No chest pain, palpitations, passing out, dizziness, or leg swelling  GASTROINTESTINAL: No abdominal or epigastric pain. No nausea, vomiting, or hematemesis; No diarrhea or constipation. No melena or hematochezia.  GENITOURINARY: No dysuria, frequency, hematuria, or incontinence  NEUROLOGICAL: No headaches, memory loss, loss of strength, numbness, or tremors  SKIN: No itching, burning, rashes, or lesions   LYMPH Nodes: No enlarged glands  ENDOCRINE: No heat or cold intolerance; No hair loss  MUSCULOSKELETAL: No joint pain or swelling; No muscle, back, or extremity pain  PSYCHIATRIC: No depression, anxiety, mood swings, or difficulty sleeping  HEME/LYMPH: No easy bruising, or bleeding gums  ALLERGY AND IMMUNOLOGIC: No hives or eczema	    [ ] All others negative	  [ ] Unable to obtain    PHYSICAL EXAM:  T(C): 36.5 (09-19-22 @ 21:30), Max: 36.6 (09-19-22 @ 11:35)  HR: 79 (09-20-22 @ 04:11) (79 - 111)  BP: 110/68 (09-19-22 @ 21:30) (109/57 - 110/68)  RR: 26 (09-19-22 @ 21:30) (22 - 36)  SpO2: 100% (09-20-22 @ 04:11) (92% - 100%)  Wt(kg): --  I&O's Summary    19 Sep 2022 07:01  -  20 Sep 2022 07:00  --------------------------------------------------------  IN: 0 mL / OUT: 1300 mL / NET: -1300 mL        Appearance: Normal	  HEENT:   Normal oral mucosa, PERRL, EOMI	  Lymphatic: No lymphadenopathy  Cardiovascular: Normal S1 S2, No JVD, + murmurs, No edema  Respiratory:rhonchi  Psychiatry: A & O x 3, Mood & affect appropriate  Gastrointestinal:  Soft, Non-tender, + BS	  Skin: No rashes, No ecchymoses, No cyanosis	  Neurologic: Non-focal  Extremities: Normal range of motion, No clubbing, cyanosis or edema  Vascular: Peripheral pulses palpable 2+ bilaterally    MEDICATIONS  (STANDING):  albuterol/ipratropium for Nebulization 3 milliLiter(s) Nebulizer <User Schedule>  allopurinol 100 milliGRAM(s) Oral daily  anastrozole 1 milliGRAM(s) Oral daily  atorvastatin 20 milliGRAM(s) Oral at bedtime  bisacodyl 5 milliGRAM(s) Oral at bedtime  buDESOnide    Inhalation Suspension 0.5 milliGRAM(s) Inhalation every 12 hours  chlorhexidine 2% Cloths 1 Application(s) Topical <User Schedule>  diltiazem    Tablet 30 milliGRAM(s) Oral three times a day  guaiFENesin Oral Liquid (Sugar-Free) 300 milliGRAM(s) Oral every 6 hours  insulin lispro (ADMELOG) corrective regimen sliding scale   SubCutaneous every 6 hours  levETIRAcetam  Solution 750 milliGRAM(s) Oral two times a day  levothyroxine 75 MICROGram(s) Oral daily  melatonin 3 milliGRAM(s) Oral at bedtime  remdesivir  IVPB   IV Intermittent   remdesivir  IVPB 100 milliGRAM(s) IV Intermittent every 24 hours  rivaroxaban 10 milliGRAM(s) Oral daily  senna 2 Tablet(s) Oral at bedtime      TELEMETRY: 	    ECG:  	  RADIOLOGY:  OTHER: 	  	  LABS:	 	    CARDIAC MARKERS:                                9.7    20.10 )-----------( 337      ( 19 Sep 2022 10:20 )             32.4     09-19    140  |  101  |  42<H>  ----------------------------<  233<H>  5.0   |  26  |  0.66    Ca    9.3      19 Sep 2022 10:20    TPro  7.1  /  Alb  3.9  /  TBili  0.4  /  DBili  x   /  AST  19  /  ALT  17  /  AlkPhos  230<H>  09-19    proBNP: Serum Pro-Brain Natriuretic Peptide: 461 pg/mL (09-08 @ 20:56)  Serum Pro-Brain Natriuretic Peptide: 155 pg/mL (08-27 @ 21:06)    Lipid Profile:   HgA1c:   TSH:     COVID-19 PCR . (09.19.22 @ 18:31)    COVID-19 PCR: Detected: You can help in the fight against COVID-19. [facility] may contact  you to see if you are interested in voluntarily participating in one of  our clinical trials.  Testing is performed using polymerase chain reaction (PCR) or  transcription mediated amplification (TMA). This COVID-19 (SARS-CoV-2)  nucleic acid amplification test was validated by [facility] and is  in use under the FDA Emergency Use Authorization (EUA) for clinical labs  CLIA-certified to perform high complexity testing. Test results should be  correlated with clinical presentation, patient history, and epidemiology.    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 Very limited hx obtained from pt given mental status, dyspnea, and use of BIPAP. Unable to reach sonSy. History obtained from staff/chart review.    Per ED staff who saw pt when she first arrived, pt was notably dyspneic with increased WOB and wheezing. Supposedly missed use of nightly BIPAP at rehab. Reportedly denied fever and cough. On encounter for admission, pt with BIPAP mask on, appearing slightly lethargic and mildly dyspneic, but was able to answer some questions. Pt endorsed having SOB. Denied pain. Seemed to believe initially that she was at [facility] rehab? but was difficult to understand her responses.    Of note, pt has multiple recent admissions with similar presentation. Most recently was hospitalized from 8/28/22-9/8/22 for SOB, treated with airway clearance and completed 5-day course of Zosyn for empiric coverage of PNA (though per Pulm, unlikely to have PNA). Course c/b PEG displacement and subsequent reinsertion by IR on 8/30. In addition to her usual PEG feeds, pt was also started on puree diet with moderately thickened fluids for pleasure feeds. Also was started on anastrozole for most likely dx of metastatic breast cancer to bones (pt was unable to tolerate further cancer workup of spine lesions). Pt remained full code during recent admissions.  pt with metastatic ca to the bone with sob sec to obesity and underlying lung and cardiac disease  increase sob sec to missing BiPAP at night  may consider pulmonary eval  may consider hospice care/ palliative care  continue Diltiazem  pt will need home o2 and Bipap at night  discussed with family at the bed side   dvt prophylaxis high risk  pt with bone mets, no further nieto/ onc noted  on steroid, taper gradually ?change to po  needs home o2  on covid therapy

## 2022-09-20 NOTE — CHART NOTE - NSCHARTNOTEFT_GEN_A_CORE
Notified by JASPAL METCALF  Medicine RN w/ concern for new afib showing on telemetry around 3:30 AM. Strip was reviewed with tele tech; found to be PACs w/ artifact. Remains in sinus rhythm at this time. EKG performed as well confirming sinus rhythm. VSS. Will continue to monitor patient.     Fátima METCALF  Medicine

## 2022-09-20 NOTE — PROGRESS NOTE ADULT - SUBJECTIVE AND OBJECTIVE BOX
afebrile  REVIEW OF SYSTEMS:  GEN: no fever,    no chills  RESP: no SOB,   no cough  CVS: no chest pain,   no palpitations  GI: no abdominal pain,   no nausea,   no vomiting,   no constipation,   no diarrhea  : no dysuria,   no frequency  NEURO: no headache,   no dizziness  PSYCH: no depression,   not anxious  Derm : no rash    MEDICATIONS  (STANDING):  albuterol/ipratropium for Nebulization 3 milliLiter(s) Nebulizer <User Schedule>  allopurinol 100 milliGRAM(s) Oral daily  anastrozole 1 milliGRAM(s) Oral daily  atorvastatin 20 milliGRAM(s) Oral at bedtime  bisacodyl 5 milliGRAM(s) Oral at bedtime  buDESOnide    Inhalation Suspension 0.5 milliGRAM(s) Inhalation every 12 hours  chlorhexidine 2% Cloths 1 Application(s) Topical <User Schedule>  diltiazem    Tablet 30 milliGRAM(s) Oral three times a day  guaiFENesin Oral Liquid (Sugar-Free) 300 milliGRAM(s) Oral every 6 hours  insulin lispro (ADMELOG) corrective regimen sliding scale   SubCutaneous every 6 hours  levETIRAcetam  Solution 750 milliGRAM(s) Oral two times a day  levothyroxine 75 MICROGram(s) Oral daily  melatonin 3 milliGRAM(s) Oral at bedtime  methylPREDNISolone sodium succinate Injectable 20 milliGRAM(s) IV Push every 6 hours  remdesivir  IVPB   IV Intermittent   remdesivir  IVPB 100 milliGRAM(s) IV Intermittent every 24 hours  rivaroxaban 10 milliGRAM(s) Oral daily  senna 2 Tablet(s) Oral at bedtime    MEDICATIONS  (PRN):  acetaminophen     Tablet .. 650 milliGRAM(s) Oral every 6 hours PRN Temp greater or equal to 38C (100.4F), Mild Pain (1 - 3)  ALPRAZolam 0.25 milliGRAM(s) Oral every 8 hours PRN severe anxiety  polyethylene glycol 3350 17 Gram(s) Oral daily PRN Constipation  traMADol 50 milliGRAM(s) Oral two times a day PRN Severe Pain (7 - 10)      Vital Signs Last 24 Hrs  T(C): 36.5 (19 Sep 2022 21:30), Max: 36.6 (19 Sep 2022 11:35)  T(F): 97.7 (19 Sep 2022 21:30), Max: 97.9 (19 Sep 2022 11:35)  HR: 79 (20 Sep 2022 04:11) (79 - 111)  BP: 110/68 (19 Sep 2022 21:30) (109/57 - 110/68)  BP(mean): --  RR: 26 (19 Sep 2022 21:30) (22 - 26)  SpO2: 100% (20 Sep 2022 04:11) (92% - 100%)    Parameters below as of 19 Sep 2022 14:36  Patient On (Oxygen Delivery Method): AVAPS      CAPILLARY BLOOD GLUCOSE      POCT Blood Glucose.: 117 mg/dL (20 Sep 2022 06:33)  POCT Blood Glucose.: 164 mg/dL (19 Sep 2022 23:49)  POCT Blood Glucose.: 150 mg/dL (19 Sep 2022 22:03)  POCT Blood Glucose.: 148 mg/dL (19 Sep 2022 16:52)  POCT Blood Glucose.: 228 mg/dL (19 Sep 2022 11:51)    I&O's Summary    19 Sep 2022 07:01  -  20 Sep 2022 07:00  --------------------------------------------------------  IN: 0 mL / OUT: 1300 mL / NET: -1300 mL        PHYSICAL EXAM:  HEAD:  Atraumatic, Normocephalic  NECK: Supple, No   JVD  CHEST/LUNG:   no     rales,     no,    rhonchi  HEART: Regular rate and rhythm;         murmur  ABDOMEN: Soft, Nontender, ;   EXTREMITIES:        edema  NEUROLOGY:  alert    LABS:                        9.7    20.10 )-----------( 337      ( 19 Sep 2022 10:20 )             32.4     09-19    140  |  101  |  42<H>  ----------------------------<  233<H>  5.0   |  26  |  0.66    Ca    9.3      19 Sep 2022 10:20    TPro  7.1  /  Alb  3.9  /  TBili  0.4  /  DBili  x   /  AST  19  /  ALT  17  /  AlkPhos  230<H>  09-19 09-19 @ 09:30  5.0  50              Consultant(s) Notes Reviewed:      Care Discussed with Consultants/Other Providers:

## 2022-09-21 NOTE — PROGRESS NOTE ADULT - SUBJECTIVE AND OBJECTIVE BOX
FRANKI MEHTA 87y [id number]    Patient is a 87y old  Female who presents with a chief complaint of SOB (21 Sep 2022 08:01)      Follow Up/CC:  ID following for    Interval History/ROS:    Allergies    Toradol (Urticaria (Mild to Mod); Rash (Mild to Mod))    Intolerances        ANTIMICROBIALS:  remdesivir  IVPB    remdesivir  IVPB 100 every 24 hours      MEDICATIONS  (STANDING):  albuterol/ipratropium for Nebulization 3 milliLiter(s) Nebulizer <User Schedule>  allopurinol 100 milliGRAM(s) Oral daily  anastrozole 1 milliGRAM(s) Oral daily  atorvastatin 20 milliGRAM(s) Oral at bedtime  bisacodyl 5 milliGRAM(s) Oral at bedtime  buDESOnide    Inhalation Suspension 0.5 milliGRAM(s) Inhalation every 12 hours  chlorhexidine 2% Cloths 1 Application(s) Topical <User Schedule>  diltiazem    Tablet 30 milliGRAM(s) Oral three times a day  guaiFENesin Oral Liquid (Sugar-Free) 300 milliGRAM(s) Oral every 6 hours  insulin lispro (ADMELOG) corrective regimen sliding scale   SubCutaneous every 6 hours  levETIRAcetam  Solution 750 milliGRAM(s) Oral two times a day  levothyroxine 75 MICROGram(s) Oral daily  melatonin 3 milliGRAM(s) Oral at bedtime  methylPREDNISolone sodium succinate Injectable 20 milliGRAM(s) IV Push every 6 hours  remdesivir  IVPB   IV Intermittent   remdesivir  IVPB 100 milliGRAM(s) IV Intermittent every 24 hours  rivaroxaban 10 milliGRAM(s) Oral daily  senna 2 Tablet(s) Oral at bedtime    MEDICATIONS  (PRN):  acetaminophen     Tablet .. 650 milliGRAM(s) Oral every 6 hours PRN Temp greater or equal to 38C (100.4F), Mild Pain (1 - 3)  ALPRAZolam 0.25 milliGRAM(s) Oral every 8 hours PRN severe anxiety  polyethylene glycol 3350 17 Gram(s) Oral daily PRN Constipation  traMADol 50 milliGRAM(s) Oral two times a day PRN Severe Pain (7 - 10)        Vital Signs Last 24 Hrs  T(C): 36.4 (21 Sep 2022 06:23), Max: 36.6 (20 Sep 2022 11:15)  T(F): 97.6 (21 Sep 2022 06:23), Max: 97.9 (20 Sep 2022 11:15)  HR: 93 (21 Sep 2022 06:23) (70 - 101)  BP: 107/68 (21 Sep 2022 06:23) (93/55 - 107/68)  BP(mean): --  RR: 18 (20 Sep 2022 21:39) (18 - 28)  SpO2: 100% (21 Sep 2022 06:23) (96% - 100%)    Parameters below as of 21 Sep 2022 06:23  Patient On (Oxygen Delivery Method): AVAPS           09-21    x   |  x   |  x   ----------------------------<  x   x    |  x   |  0.50      TPro  6.8  /  Alb  3.6  /  TBili  0.5  /  DBili  0.2  /  AST  14  /  ALT  16  /  AlkPhos  234<H>  09-21    LIVER FUNCTIONS - ( 21 Sep 2022 06:42 )  Alb: 3.6 g/dL / Pro: 6.8 g/dL / ALK PHOS: 234 U/L / ALT: 16 U/L / AST: 14 U/L / GGT: x               MICROBIOLOGY:  .Blood Blood-Peripheral  09-08-22   No Growth Final  --  --      .Blood Blood-Peripheral  09-08-22   No Growth Final  --  --      Clean Catch Clean Catch (Midstream)  08-27-22   >100,000 CFU/ml Enterococcus faecium  <10,000 CFU/ml Normal Urogenital ck present  --  Enterococcus faecium      .Blood Blood-Peripheral  08-27-22   No Growth Final  --  --      .Blood Blood-Peripheral  08-27-22   No Growth Final  --  --              v            RADIOLOGY     FRANKI MEHTA 87y [id number]    Patient is a 87y old  Female who presents with a chief complaint of SOB (21 Sep 2022 08:01)      Follow Up/CC:  ID following for covid    Interval History/ROS: on bipap, no fever    Allergies    Toradol (Urticaria (Mild to Mod); Rash (Mild to Mod))    Intolerances        ANTIMICROBIALS:  remdesivir  IVPB    remdesivir  IVPB 100 every 24 hours      MEDICATIONS  (STANDING):  albuterol/ipratropium for Nebulization 3 milliLiter(s) Nebulizer <User Schedule>  allopurinol 100 milliGRAM(s) Oral daily  anastrozole 1 milliGRAM(s) Oral daily  atorvastatin 20 milliGRAM(s) Oral at bedtime  bisacodyl 5 milliGRAM(s) Oral at bedtime  buDESOnide    Inhalation Suspension 0.5 milliGRAM(s) Inhalation every 12 hours  chlorhexidine 2% Cloths 1 Application(s) Topical <User Schedule>  diltiazem    Tablet 30 milliGRAM(s) Oral three times a day  guaiFENesin Oral Liquid (Sugar-Free) 300 milliGRAM(s) Oral every 6 hours  insulin lispro (ADMELOG) corrective regimen sliding scale   SubCutaneous every 6 hours  levETIRAcetam  Solution 750 milliGRAM(s) Oral two times a day  levothyroxine 75 MICROGram(s) Oral daily  melatonin 3 milliGRAM(s) Oral at bedtime  methylPREDNISolone sodium succinate Injectable 20 milliGRAM(s) IV Push every 6 hours  remdesivir  IVPB   IV Intermittent   remdesivir  IVPB 100 milliGRAM(s) IV Intermittent every 24 hours  rivaroxaban 10 milliGRAM(s) Oral daily  senna 2 Tablet(s) Oral at bedtime    MEDICATIONS  (PRN):  acetaminophen     Tablet .. 650 milliGRAM(s) Oral every 6 hours PRN Temp greater or equal to 38C (100.4F), Mild Pain (1 - 3)  ALPRAZolam 0.25 milliGRAM(s) Oral every 8 hours PRN severe anxiety  polyethylene glycol 3350 17 Gram(s) Oral daily PRN Constipation  traMADol 50 milliGRAM(s) Oral two times a day PRN Severe Pain (7 - 10)        Vital Signs Last 24 Hrs  T(C): 36.4 (21 Sep 2022 06:23), Max: 36.6 (20 Sep 2022 11:15)  T(F): 97.6 (21 Sep 2022 06:23), Max: 97.9 (20 Sep 2022 11:15)  HR: 93 (21 Sep 2022 06:23) (70 - 101)  BP: 107/68 (21 Sep 2022 06:23) (93/55 - 107/68)  BP(mean): --  RR: 18 (20 Sep 2022 21:39) (18 - 28)  SpO2: 100% (21 Sep 2022 06:23) (96% - 100%)    Parameters below as of 21 Sep 2022 06:23  Patient On (Oxygen Delivery Method): AVAPS           09-21    x   |  x   |  x   ----------------------------<  x   x    |  x   |  0.50      TPro  6.8  /  Alb  3.6  /  TBili  0.5  /  DBili  0.2  /  AST  14  /  ALT  16  /  AlkPhos  234<H>  09-21    LIVER FUNCTIONS - ( 21 Sep 2022 06:42 )  Alb: 3.6 g/dL / Pro: 6.8 g/dL / ALK PHOS: 234 U/L / ALT: 16 U/L / AST: 14 U/L / GGT: x               MICROBIOLOGY:  .Blood Blood-Peripheral  09-08-22   No Growth Final  --  --      .Blood Blood-Peripheral  09-08-22   No Growth Final  --  --      Clean Catch Clean Catch (Midstream)  08-27-22   >100,000 CFU/ml Enterococcus faecium  <10,000 CFU/ml Normal Urogenital ck present  --  Enterococcus faecium      .Blood Blood-Peripheral  08-27-22   No Growth Final  --  --      .Blood Blood-Peripheral  08-27-22   No Growth Final  --  --              v            RADIOLOGY

## 2022-09-21 NOTE — PROGRESS NOTE ADULT - SUBJECTIVE AND OBJECTIVE BOX
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; PEG in place    INTERVAL HISTORY: spent the day on AVAPS -> removed this AM with somewhat less increased work of breathing and decreased chest congestion and wheeze; VBG not sent this AM; in airborne and contact isolation due to hospital acquired COVID infection; awake and alert; occasional cough productive of scant sputum; no fevers, chills or sweats; no chest pain/pressure or palpitations;       REVIEW OF SYSTEMS:  Constitutional: As per interval history  HEENT: Within normal limits  CV: As per interval history  Resp: As per interval history  GI: dysphagia  : Within normal limits  Musculoskeletal: Within normal limits  Skin: Within normal limits  Neurological: CVA - left sided plegia  Psychiatric: Within normal limits  Endocrine: Within normal limits  Hematologic/Lymphatic: Within normal limits  Allergic/Immunologic: Within normal limits    MEDICATIONS:     Pulmonary "  albuterol/ipratropium for Nebulization 3 milliLiter(s) Nebulizer <User Schedule>  buDESOnide    Inhalation Suspension 0.5 milliGRAM(s) Inhalation every 12 hours  guaiFENesin Oral Liquid (Sugar-Free) 300 milliGRAM(s) Oral every 6 hours    Anti-microbials:  remdesivir  IVPB   IV Intermittent   remdesivir  IVPB 100 milliGRAM(s) IV Intermittent every 24 hours    Cardiovascular:  diltiazem    Tablet 30 milliGRAM(s) Oral three times a day    Other:  allopurinol 100 milliGRAM(s) Oral daily  anastrozole 1 milliGRAM(s) Oral daily  atorvastatin 20 milliGRAM(s) Oral at bedtime  bisacodyl 5 milliGRAM(s) Oral at bedtime  chlorhexidine 2% Cloths 1 Application(s) Topical <User Schedule>  insulin lispro (ADMELOG) corrective regimen sliding scale   SubCutaneous every 6 hours  levETIRAcetam  Solution 750 milliGRAM(s) Oral two times a day  levothyroxine 75 MICROGram(s) Oral daily  melatonin 3 milliGRAM(s) Oral at bedtime  methylPREDNISolone sodium succinate Injectable 20 milliGRAM(s) IV Push every 6 hours  rivaroxaban 10 milliGRAM(s) Oral daily  senna 2 Tablet(s) Oral at bedtime    MEDICATIONS  (PRN):  acetaminophen     Tablet .. 650 milliGRAM(s) Oral every 6 hours PRN Temp greater or equal to 38C (100.4F), Mild Pain (1 - 3)  ALPRAZolam 0.25 milliGRAM(s) Oral every 8 hours PRN severe anxiety  polyethylene glycol 3350 17 Gram(s) Oral daily PRN Constipation  traMADol 50 milliGRAM(s) Oral two times a day PRN Severe Pain (7 - 10)    OBJECTIVE:    POCT Blood Glucose.: 164 mg/dL (21 Sep 2022 06:06)  POCT Blood Glucose.: 172 mg/dL (21 Sep 2022 00:09)  POCT Blood Glucose.: 201 mg/dL (20 Sep 2022 21:35)  POCT Blood Glucose.: 186 mg/dL (20 Sep 2022 16:40)  POCT Blood Glucose.: 162 mg/dL (20 Sep 2022 12:11)      PHYSICAL EXAM:       ICU Vital Signs Last 24 Hrs  T(C): 36.4 (21 Sep 2022 06:23), Max: 36.6 (20 Sep 2022 11:15)  T(F): 97.6 (21 Sep 2022 06:23), Max: 97.9 (20 Sep 2022 11:15)  HR: 93 (21 Sep 2022 06:23) (70 - 101)  BP: 107/68 (21 Sep 2022 06:23) (93/55 - 107/68)  BP(mean): --  ABP: --  ABP(mean): --  RR: 18 (20 Sep 2022 21:39) (18 - 28)  SpO2: 100% (21 Sep 2022 06:23) (96% - 100%)    O2 Parameters below as of 21 Sep 2022 06:23  Patient On (Oxygen Delivery Method): AVAPS     General: Awake. Alert. Cooperative. Using accessory muscles of respirations. Appears stated age. Obese. Bedbound. AVAPS  HEENT: Atraumatic. Normocephalic. Anicteric. Normal oral mucosa. PERRL. EOMI. Mallampati class IV airway. Poor dentition. Full face mask.  Neck: Supple. Trachea midline. Thyroid without enlargement/tenderness/nodules. No carotid bruit. No JVD. Short and wide. Neck flexed and chin resting on her anterior chest with wheezing.   Cardiovascular: Regular rate and rhythm. S1 S2 normal. III/VI systolic murmur  Respiratory: In respiratory distress with abdominal and neck breathing. Severe course breath sounds and wheeze. Kyphosis. Poor chest wall excursion  Abdomen: Soft. Non-tender. Non-distended. No organomegaly. No masses. Normal bowel sounds. Obese. PEG.  Extremities: Warm to touch. No clubbing or cyanosis. No pedal edema. Large legs. Left leg contracted under the right leg  Pulses: 2+ peripheral pulses all extremities.	  Skin: Normal skin color. No rashes or lesions. No ecchymoses. No cyanosis. Warm to touch.  Lymph Nodes: Cervical, supraclavicular and axillary nodes normal  Neurological: Left lower extremity plegia with contraction. Left upper extremity is quite weak. A and O x 3    LABS:                          9.7    20.10 )-----------( 337      ( 19 Sep 2022 10:20 )             32.4     CBC    WBC  20.10 <==, 9.55 <==, 9.07 <==, 5.91 <==    Hemoglobin  9.7 <<==, 8.1 <<==, 7.9 <<==, 7.9 <<==    Hematocrit  32.4 <==, 27.0 <==, 27.2 <==, 27.1 <==    Platelets  337 <==, 298 <==, 312 <==, 318 <==      x   |  x   |  x   ----------------------------<  x     09-21  x    |  x   |  0.50      LYTES    sodium  140 <==, 135 <==, 136 <==, 136 <==, 136 <==    potassium   5.0 <==, 4.7 <==, 4.5 <==, 5.1 <==, 4.5 <==    chloride  101 <==, 98 <==, 99 <==, 98 <==, 96 <==    carbon dioxide  26 <==, 24 <==, 29 <==, 27 <==, 24 <==    =============================================================================================  RENAL FUNCTION:    Creatinine:   0.50  <<==, 0.66  <<==, 0.57  <<==, 0.59  <<==, 0.59  <<==, 0.68  <<==    BUN:   42 <==, 32 <==, 33 <==, 34 <==, 34 <==    ============================================================================================    calcium   9.3 <==, 9.1 <==, 9.2 <==, 9.0 <==, 9.2 <==  ============================================================================================  LFTs    AST:   14 <== , 19 <== , 11 <== , 22 <==     ALT:  16  <== , 17  <== , 12  <== , 11  <==     AP:  234  <=, 230  <=, 204  <=, 158  <=    Bili:  0.5  <=, 0.4  <=, 0.2  <=, 0.2  <=    PT/INR - ( 21 Sep 2022 06:39 )   PT: 12.8 sec;   INR: 1.11 ratio      Venous Blood Gas:  09-19 @ 09:30  7.34/53/50/29/80.0  VBG Lactate: 3.5    Venous Blood Gas:  09-18 @ 03:50  7.39/48/47/29/78.2  VBG Lactate: 1.7    Procalcitonin, Serum: 0.23 ng/mL (09-18 @ 09:59)    D-Dimer Assay, Quantitative (09.18.22 @ 09:59)   D-Dimer Assay, Quantitative: 196:    C-Reactive Protein, Serum (09.18.22 @ 09:59)   C-Reactive Protein, Serum: 5 mg/L     Ferritin, Serum in AM (09.18.22 @ 09:59)   Ferritin, Serum: 63 ng/mL     < from: TTE with Doppler (w/Cont) (01.21.22 @ 14:08) >    Patient name: FRANKI MEHTA  YOB: 1935   Age: 86 (F)   MR#: [id number]  Study Date: 1/21/2022  ------------------------------------------------------------------------  Dimensions:    Normal Values:  LA:     2.5    2.0 - 4.0 cm  Ao:     3.3    2.0 - 3.8 cm  SEPTUM: 1.6    0.6 - 1.2 cm  PWT:    0.9    0.6 - 1.1 cm  LVIDd:  3.6    3.0 - 5.6 cm  LVIDs:  2.5    1.8 - 4.0 cm  Derived variables:  LVMI: 77 g/m2  RWT: 0.50  Fractional short: 31 %  EF (Visual Estimate): 70-75 %  ------------------------------------------------------------------------  Conclusions:  1. Concentric left ventricular hypertrophy.  2. Hyperdynamic left ventricular systolic function.  Endocardial visualization enhanced with intravenous  injection of Ultrasonic Enhancing Agent (Definity).  3. The right ventricle is not well visualized; grossly  normal right ventricular systolic function.  Pt refused to proceed with exam.  Limited Doppler study.  No trans aortic gradients.  Unable to rule out endocarditis.  ------------------------------------------------------------------------  Confirmed on 1/21/2022 - 15:42:57 by Kole Mcfarland M.D.  FASE  ------------------------------------------------------------------------  ---------------------------------------------------------------------------------------------------------------      MICROBIOLOGY:     COVID-19 PCR . (09.19.22 @ 18:31)   COVID-19 PCR: Detected:    Respiratory Viral Panel with COVID-19 by RYAN (09.08.22 @ 21:38)   Rapid RVP Result: [facility]   SARS-CoV-2: [facility]  This Respiratory Panel uses polymerase chain reaction (PCR) to detect for   adenovirus; coronavirus (HKU1, NL63, 229E, OC43); human metapneumovirus   (hMPV); human enterovirus/rhinovirus (Entero/RV); influenza A; influenza   A/H1; influenza A/H3; influenza A/H1-2009; influenza B; parainfluenza   viruses 1, 2, 3, 4; respiratory syncytial virus; Mycoplasma pneumoniae;   Chlamydophila pneumoniae; and SARS-CoV-2.     Culture - Blood (09.08.22 @ 20:39)   Specimen Source: .Blood Blood-Peripheral   Culture Results:   No growth to date.     Culture - Blood (09.08.22 @ 20:25)   Specimen Source: .Blood Blood-Peripheral   Culture Results:   No growth to date.     RADIOLOGY:  [x ] Chest radiographs reviewed and interpreted by me    EXAM:  XR CHEST PORTABLE URGENT 1V                          PROCEDURE DATE:  09/19/2022      FINDINGS:  Radiographic interpretation is limited by patient positioning and   overlying artifact.  Gastrostomy tube is noted.  Left humeral fixation plate and screws are noted.  The heart size cannot be accurately assessed in this projection.  Left basilar hazy opacities  Low lung volumes bilaterally.  There is no pneumothorax or large pleural effusion.  No acute bony abnormality.    IMPRESSION:  Left basilar hazy opacity may represent atelectasis.    AMY JARAMILLO MD; Resident Radiologist  This document has been electronically signed.  DIANE CLARK MD; Attending Radiologist  This document has been electronically signed. Sep 19 2022  6:08PM  ---------------------------------------------------------------------------------------------------------------    EXAM:  XR CHEST PORTABLE URGENT 1V                          PROCEDURE DATE:  09/12/2022      FINDINGS:    Gastrostomy tube.  The heart is enlarged.  Patchy left mid and lower lung opacity is minimally increased. The right   lung is clear.  No pleural effusion or pneumothorax.    IMPRESSION:  Minimally increased left mid to lower lung atelectasis..    ROBSON PIZANO MD; Resident Radiology  This document has been electronically signed.  DIANE CLARK MD; Attending Radiologist  This document has been electronically signed. Sep 12 2022  2:46PM  --------------------------------------------------------------------------------------------------------------  EXAM:  CT CHEST                          PROCEDURE DATE:  09/14/2022      FINDINGS:    LUNGS AND AIRWAYS: Patent central airways.  Subsegmental atelectasis is   seen in the bilateral upper and lower lobes.  PLEURA: No pleural effusion.  MEDIASTINUM AND GIOVANA: No lymphadenopathy.  VESSELS: Calcifications of the aorta, aortic valve and coronary arteries.  HEART: Heart size is normal. No pericardial effusion.  CHEST WALL AND LOWER NECK: Within normal limits.  VISUALIZED UPPER ABDOMEN: Cholelithiasis.  BONES: Multiple lytic bone lesions are again seen including a destructive   lesion at the level of the T5 vertebral body that may extend into the   left neural foramen (3:42), unchanged from prior CT chest.    IMPRESSION:  Subsegmental atelectasis in the bilateral upper and lower lobes.    Lytic bone lesions as described above, unchanged from prior CT chest   8/29/2022. Recommend MR thoracic spine for further evaluation.     ALEXANDRIA COLLINS MD; Resident Radiologist  This document has been electronically signed.  HAY IRVIN MD; Attending Radiologist  This document has been electronically signed. Sep 14 2022  3:23PM  ---------------------------------------------------------------------------------------------------------------  EXAM:  DUPLEX SCAN EXT VEINS LOWER BI                          PROCEDURE DATE:  09/10/2022      IMPRESSION:  Limited study  No evidence of deep venous thrombosis in either lower extremity.  Limited visualization of the calf veins.    KEYLA ROMERO MD; Attending Radiologist  This document has been electronically signed. Sep 10 2022 10:42AM  ---------------------------------------------------------------------------------------------------------------  EXAM:  CT ABDOMEN AND PELVIS IC                          PROCEDURE DATE:  08/31/2022      IMPRESSION:    Multiple lytic lesions are seen throughout the axial and appendicular   skeleton, some of which appear increased in size from CT abdomen pelvis   3/16/2022 when evaluated in retrospect. A few lytic lesions involve the   left intertrochanteric region and right acetabulum.    Endometrial thickening. Recommend dedicated pelvic sonogram.    Cystic lesions are seen within the pancreas. Recommend a dedicated MRI on   a nonemergent basis.    SULY KENDRICK MD; Resident Radiology  This document has been electronically signed.  BRITTANI MALCOLM MD; Attending Radiologist  This document has been electronically signed. Sep  1 2022 11:01AM  ---------------------------------------------------------------------------------------------------------------  EXAM:  XR HUMERUS MIN 2 VIEWS RT                          PROCEDURE DATE:  08/30/2022      EXAM:  Internal/external rotation AP right humerus from 8/30/2022 at 0927.   Compared to appearance on previous day chest CT.    IMPRESSION:  Generalized osteopenia. Redemonstrated focal lytic lesion in proximal   medial humeral diaphysis with small area of permeative cortical   destruction. No additional radiographically appreciated suspicious lytic   or blastic lesions in remaining imaged regions.    No current fractures or dislocations.    Preserved shoulder and elbow joint spaces.    IV cannula with attached tubing overlies upper arm.    MINE NICE MD; Attending Radiologist  This document has been electronically signed. Aug 30 2022 10:39AM  ---------------------------------------------------------------------------------------------------------------  EXAM:  DUPLEX EXT VEINS UPPER LT                          PROCEDURE DATE:  09/01/2022      IMPRESSION:  No evidence of left upper extremity deep venous thrombosis.    FAWN FITZGERALD MD; Attending Radiologist  This document has been electronically signed. Sep  1 2022  1:10PM  ---------------------------------------------------------------------------------------------------------------  EXAM:  DUPLEX SCAN EXT VEINS LOWER BI                          PROCEDURE DATE:  08/31/2022      IMPRESSION:  No evidence of deep venous thrombosis in either lower extremity.    KEYLA ROMERO MD; Attending Radiologist  This document has been electronically signed. Aug 31 2022  7:59PM  ---------------------------------------------------------------------------

## 2022-09-21 NOTE — PROGRESS NOTE ADULT - SUBJECTIVE AND OBJECTIVE BOX
CARDIOLOGY     PROGRESS  NOTE   ________________________________________________    CHIEF COMPLAINT:Patient is a 87y old  Female who presents with a chief complaint of SOB (21 Sep 2022 05:52)  lethargic.  	  REVIEW OF SYSTEMS:  CONSTITUTIONAL: No fever, weight loss, or fatigue  EYES: No eye pain, visual disturbances, or discharge  ENT:  No difficulty hearing, tinnitus, vertigo; No sinus or throat pain  NECK: No pain or stiffness  RESPIRATORY: No cough, wheezing, chills or hemoptysis; No Shortness of Breath  CARDIOVASCULAR: No chest pain, palpitations, passing out, dizziness, or leg swelling  GASTROINTESTINAL: No abdominal or epigastric pain. No nausea, vomiting, or hematemesis; No diarrhea or constipation. No melena or hematochezia.  GENITOURINARY: No dysuria, frequency, hematuria, or incontinence  NEUROLOGICAL: No headaches, memory loss, loss of strength, numbness, or tremors  SKIN: No itching, burning, rashes, or lesions   LYMPH Nodes: No enlarged glands  ENDOCRINE: No heat or cold intolerance; No hair loss  MUSCULOSKELETAL: No joint pain or swelling; No muscle, back, or extremity pain  PSYCHIATRIC: No depression, anxiety, mood swings, or difficulty sleeping  HEME/LYMPH: No easy bruising, or bleeding gums  ALLERGY AND IMMUNOLOGIC: No hives or eczema	    [ ] All others negative	  [ x] Unable to obtain    PHYSICAL EXAM:  T(C): 36.4 (09-21-22 @ 06:23), Max: 36.6 (09-20-22 @ 11:15)  HR: 93 (09-21-22 @ 06:23) (70 - 101)  BP: 107/68 (09-21-22 @ 06:23) (93/55 - 107/68)  RR: 18 (09-20-22 @ 21:39) (18 - 28)  SpO2: 100% (09-21-22 @ 06:23) (96% - 100%)  Wt(kg): --  I&O's Summary    20 Sep 2022 07:01  -  21 Sep 2022 07:00  --------------------------------------------------------  IN: 250 mL / OUT: 800 mL / NET: -550 mL        Appearance: Normal	  HEENT:   Normal oral mucosa, PERRL, EOMI	  Lymphatic: No lymphadenopathy  Cardiovascular: Normal S1 S2, No JVD, + murmurs, + edema  Respiratory: decrease bs  Psychiatry: A & O x 3, Mood & affect appropriate  Gastrointestinal:  Soft, Non-tender, + BS	  Skin: No rashes, No ecchymoses, No cyanosis	  Neurologic: Non-focal  Extremities: Normal range of motion, No clubbing, cyanosis + edema  Vascular: Peripheral pulses palpable 2+ bilaterally    MEDICATIONS  (STANDING):  albuterol/ipratropium for Nebulization 3 milliLiter(s) Nebulizer <User Schedule>  allopurinol 100 milliGRAM(s) Oral daily  anastrozole 1 milliGRAM(s) Oral daily  atorvastatin 20 milliGRAM(s) Oral at bedtime  bisacodyl 5 milliGRAM(s) Oral at bedtime  buDESOnide    Inhalation Suspension 0.5 milliGRAM(s) Inhalation every 12 hours  chlorhexidine 2% Cloths 1 Application(s) Topical <User Schedule>  diltiazem    Tablet 30 milliGRAM(s) Oral three times a day  guaiFENesin Oral Liquid (Sugar-Free) 300 milliGRAM(s) Oral every 6 hours  insulin lispro (ADMELOG) corrective regimen sliding scale   SubCutaneous every 6 hours  levETIRAcetam  Solution 750 milliGRAM(s) Oral two times a day  levothyroxine 75 MICROGram(s) Oral daily  melatonin 3 milliGRAM(s) Oral at bedtime  methylPREDNISolone sodium succinate Injectable 20 milliGRAM(s) IV Push every 6 hours  remdesivir  IVPB   IV Intermittent   remdesivir  IVPB 100 milliGRAM(s) IV Intermittent every 24 hours  rivaroxaban 10 milliGRAM(s) Oral daily  senna 2 Tablet(s) Oral at bedtime      TELEMETRY: 	    ECG:  	  RADIOLOGY:  OTHER: 	  	  LABS:	 	    CARDIAC MARKERS:                                9.7    20.10 )-----------( 337      ( 19 Sep 2022 10:20 )             32.4     09-21    x   |  x   |  x   ----------------------------<  x   x    |  x   |  0.50    Ca    9.3      19 Sep 2022 10:20    TPro  6.8  /  Alb  3.6  /  TBili  0.5  /  DBili  0.2  /  AST  14  /  ALT  16  /  AlkPhos  234<H>  09-21    proBNP: Serum Pro-Brain Natriuretic Peptide: 461 pg/mL (09-08 @ 20:56)  Serum Pro-Brain Natriuretic Peptide: 155 pg/mL (08-27 @ 21:06)    Lipid Profile:   HgA1c:   TSH:   PT/INR - ( 21 Sep 2022 06:39 )   PT: 12.8 sec;   INR: 1.11 ratio         Cont remdesevir   On methylprednisolone per pulm  Continue supplemental O2 and supportive care per primary team  Continue Contact and Airborne Isolation precautions      Assessment and plan  ---------------------------  86F w/ extensive hx including severe tracheobronchomalacia (c/b hypoxia 2/2 mucous plugging and recurrent L lung collapse), 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 Very limited hx obtained from pt given mental status, dyspnea, and use of BIPAP. Unable to reach sonSy. History obtained from staff/chart review.    Per ED staff who saw pt when she first arrived, pt was notably dyspneic with increased WOB and wheezing. Supposedly missed use of nightly BIPAP at rehab. Reportedly denied fever and cough. On encounter for admission, pt with BIPAP mask on, appearing slightly lethargic and mildly dyspneic, but was able to answer some questions. Pt endorsed having SOB. Denied pain. Seemed to believe initially that she was at [facility]? but was difficult to understand her responses.    Of note, pt has multiple recent admissions with similar presentation. Most recently was hospitalized from 8/28/22-9/8/22 for SOB, treated with airway clearance and completed 5-day course of Zosyn for empiric coverage of PNA (though per Pulm, unlikely to have PNA). Course c/b PEG displacement and subsequent reinsertion by IR on 8/30. In addition to her usual PEG feeds, pt was also started on puree diet with moderately thickened fluids for pleasure feeds. Also was started on anastrozole for most likely dx of metastatic breast cancer to bones (pt was unable to tolerate further cancer workup of spine lesions). Pt remained full code during recent admissions.  pt with metastatic ca to the bone with sob sec to obesity and underlying lung and cardiac disease  increase sob sec to missing BiPAP at night  may consider pulmonary eval  may consider hospice care/ palliative care  continue Diltiazem  pt will need home o2 and Bipap at night  discussed with family at the bed side   dvt prophylaxis high risk on xarelto  pt with bone mets, no further nieto/ onc noted  on steroid, taper gradually ?change to po  needs home o2  on covid therapy, ID noted

## 2022-09-21 NOTE — PROGRESS NOTE ADULT - SUBJECTIVE AND OBJECTIVE BOX
afbrile    REVIEW OF SYSTEMS:  GEN: no fever,    no chills  RESP: no SOB,   no cough  CVS: no chest pain,   no palpitations  GI: no abdominal pain,   no nausea,   no vomiting,   no constipation,   no diarrhea  : no dysuria,   no frequency  NEURO: no headache,   no dizziness  PSYCH: no depression,   not anxious  Derm : no rash    MEDICATIONS  (STANDING):  albuterol/ipratropium for Nebulization 3 milliLiter(s) Nebulizer <User Schedule>  allopurinol 100 milliGRAM(s) Oral daily  anastrozole 1 milliGRAM(s) Oral daily  atorvastatin 20 milliGRAM(s) Oral at bedtime  bisacodyl 5 milliGRAM(s) Oral at bedtime  buDESOnide    Inhalation Suspension 0.5 milliGRAM(s) Inhalation every 12 hours  chlorhexidine 2% Cloths 1 Application(s) Topical <User Schedule>  diltiazem    Tablet 30 milliGRAM(s) Oral three times a day  guaiFENesin Oral Liquid (Sugar-Free) 300 milliGRAM(s) Oral every 6 hours  insulin lispro (ADMELOG) corrective regimen sliding scale   SubCutaneous every 6 hours  levETIRAcetam  Solution 750 milliGRAM(s) Oral two times a day  levothyroxine 75 MICROGram(s) Oral daily  melatonin 3 milliGRAM(s) Oral at bedtime  methylPREDNISolone sodium succinate Injectable 20 milliGRAM(s) IV Push every 6 hours  remdesivir  IVPB   IV Intermittent   remdesivir  IVPB 100 milliGRAM(s) IV Intermittent every 24 hours  rivaroxaban 10 milliGRAM(s) Oral daily  senna 2 Tablet(s) Oral at bedtime    MEDICATIONS  (PRN):  acetaminophen     Tablet .. 650 milliGRAM(s) Oral every 6 hours PRN Temp greater or equal to 38C (100.4F), Mild Pain (1 - 3)  ALPRAZolam 0.25 milliGRAM(s) Oral every 8 hours PRN severe anxiety  polyethylene glycol 3350 17 Gram(s) Oral daily PRN Constipation  traMADol 50 milliGRAM(s) Oral two times a day PRN Severe Pain (7 - 10)      Vital Signs Last 24 Hrs  T(C): 36.4 (20 Sep 2022 21:39), Max: 36.6 (20 Sep 2022 11:15)  T(F): 97.6 (20 Sep 2022 21:39), Max: 97.9 (20 Sep 2022 11:15)  HR: 101 (21 Sep 2022 04:40) (70 - 101)  BP: 96/63 (20 Sep 2022 23:01) (93/55 - 106/61)  BP(mean): --  RR: 18 (20 Sep 2022 21:39) (18 - 28)  SpO2: 100% (21 Sep 2022 04:40) (96% - 100%)    Parameters below as of 20 Sep 2022 21:39  Patient On (Oxygen Delivery Method): BiPAP/CPAP      CAPILLARY BLOOD GLUCOSE      POCT Blood Glucose.: 172 mg/dL (21 Sep 2022 00:09)  POCT Blood Glucose.: 201 mg/dL (20 Sep 2022 21:35)  POCT Blood Glucose.: 186 mg/dL (20 Sep 2022 16:40)  POCT Blood Glucose.: 162 mg/dL (20 Sep 2022 12:11)  POCT Blood Glucose.: 117 mg/dL (20 Sep 2022 06:33)    I&O's Summary    19 Sep 2022 07:01  -  20 Sep 2022 07:00  --------------------------------------------------------  IN: 0 mL / OUT: 1300 mL / NET: -1300 mL    20 Sep 2022 07:01  -  21 Sep 2022 05:52  --------------------------------------------------------  IN: 250 mL / OUT: 300 mL / NET: -50 mL        PHYSICAL EXAM:  HEAD:  Atraumatic, Normocephalic  NECK: Supple, No   JVD  CHEST/LUNG:   no     rales,     no,    rhonchi  HEART: Regular rate and rhythm;         murmur  ABDOMEN: Soft, Nontender, ;   EXTREMITIES:   no     edema  NEUROLOGY:  arousable    LABS:                        9.7    20.10 )-----------( 337      ( 19 Sep 2022 10:20 )             32.4     09-19    140  |  101  |  42<H>  ----------------------------<  233<H>  5.0   |  26  |  0.66    Ca    9.3      19 Sep 2022 10:20    TPro  7.1  /  Alb  3.9  /  TBili  0.4  /  DBili  x   /  AST  19  /  ALT  17  /  AlkPhos  230<H>  09-19 09-19 @ 09:30  5.0  50              Consultant(s) Notes Reviewed:      Care Discussed with Consultants/Other Providers:

## 2022-09-21 NOTE — PROGRESS NOTE ADULT - ASSESSMENT
89    year old female    h/o hemorrhagic CVA, has  PEG,  HTN, MI,   HLD, gout, right ca  breast   right Ca breast. s/p mastectomy in 2019,  s/p  sentinel node  localization.  4/4,  were  negative  peg dislodged.  IR   replacements  on 1/3/22  s/p rrt  . in past,  for hypoxia. cxr with complete  collapsed  of  left lung, needing broch,   s/ p  bronchoscopy , pt  with  tracheomalacia  and  collapsed  airways,  makes  this a  difficult  situation, as there is no good rx for this     h/o bacteremia. on   pna, perforated  appendicitis,  ?  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 this will not alter rx. pt  redvd  extended  course  of ab   on iv  vanco and ertapenem.  till  2/9/.22.        *  admitted with   presumed  resp  failure/ pt was  sent back from [facility], the same day   pt seen in er.  appears   at her naseline.  no  wheezing /  atousable    ENT eval w/ laryngoscopy notable for vocal cords mobile and intact, no edema, upper airway patent     hypertonic saline for airway clearance   cxr, no pna     *   s/p cva, has  PEG,  npo  ,   on  synthroid, Keppra  ,  *   HTN, on  cardizem,  per  card dr jamison  *  h/o  Ca breast. /, s/p  R  mastectomy  *   obesity, bmi  was   41, now  is  30   *     c/c left  leg contracture ,  remains  bed  bound. functional  paraplegia,  needs  assistance  for  all her  adl's   *  pt  with  h/o  tracheomalacia  and  collapsed  airways,      given pt;s  mlple co morbidities,  propensity  for  lung collpse,   pt is  at risk for  re admissions     on nocturnal  bipap    difficult  situation, a s there  is no  good  rx  for  pt's  recurrent lung collapse hypoxia     h/o  of  chronic    mild  tachycardia          *   CT chest. lytic  lesions, T  spine/  ribs/ humerus/  oncoloigy  magnus pacheco.  suspect  metastatic ca breast        son. guicho Dan/ oncologist  has  also  discussed  with  son,  futility of  pursuing  bx. a s pt  is  not an ideal   candidate  for  chemo    CT  A.p ,/ prelim ,  pelvic  mets  per  oncology,   suspect  metastatic ca  breast,  was  awaiting   mri  spine/  pt unable  to tolerate  mri   per oncology, no  further  intervention/  and on  Arimidex, now,   per d r ohri   obesity,  bed bound.  functional paraplegia    per  son,   rehab promptly  returned   to er/ as they  did not  have  a  bipap machine /  care cortn to f/.p, needs  24/7  O2/ nocturnal bipap   difficult  situation,  bed  bound. obesity. c/c  hypercarbia, needing , prn /  nocturnal bipap/  with intermittent tachypnea    now  is  covid +. on iv steroid/ wheezing/ remdissivir/, seen by  house  ID     dvt ppx/  xarelto  for   30  days/  remains on iv steroid  pt  with  frequent episodes  of tachypnea. has  obstructive /restrictive   lung  disease ,  obesity/ RAQUEL/ hypercarbia  will  speak  again  with son regarding   dnr   remains on iv  steroid,  difficult  situation      per  son, pt is  full code

## 2022-09-21 NOTE — PROGRESS NOTE ADULT - ASSESSMENT
Mr. 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  Pt was admitted for management of Acute on Chronic respiratory failure 2/2 severe tracheomalacia and bronchospasm. Pt was managed with NIV and steroids. Pt was tested for DC planning yesterday and found to be COVID positive and ID consulted for the same.    WORKUP  COVID-19 PCR: Detected (09-16-22 @ 18:18)  COVID-19 PCR: NotDetec (09-13-22 @ 08:24)    DIAGNOSIS and IMPRESSION  ·	Nosocomial COVID 19  ·	Acute on Chronic respiratory failure 2/2 severe tracheomalacia and bronchospasm.  ·	metastatic breast cancer to bones s/p mastectomy (pt was unable to tolerate further cancer workup of spine lesions).     Pt started on remdesevir (9/17)  Pt with stable O2 requirements 3 L during day and BiPAP at night  Pt was transitioned from her prednisone taper to solumedrol 20 IV Q6 by pulm.    RECOMMENDATIONS  Cont remdesevir   On methylprednisolone per pulm  Continue supplemental O2 and supportive care per primary team  Continue Contact and Airborne Isolation precautions    John Fragoso  Attending Physician   Division of Infectious Disease  Office #[phone]  Available on [employer] Teams also  After 5pm/weekend or no response, call #[phone]

## 2022-09-21 NOTE — PROGRESS NOTE ADULT - ASSESSMENT
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 She also has a history of HTN, HLD, CAD s/p MI with preserved LVEF and moderate aortic stenosis. The patient was admitted to [facility] in December 2021 with fever and abdominal pain due to perforated appendicitis. She was treated with tube drainage and antibiotics for a polymicrobial infection. Hospital course was complicated by respiratory failure due to mucous plugging with complete collapse of the left lung. She underwent several bronchoscopies for pulmonary toilet and was found to have severe tracheobronchomalacia. Her respiratory status has remained "stable" while at [facility] on nebulized bronchodilators and hypertonic saline and without nocturnal NIV. She was admitted in March with hematochezia. The left lower lobe was well aerated on a CT scan of the abdomen and pelvis with only bibasilar subsegmental atelectasis - there was scattered sigmoid diverticulosis without evidence of diverticulitis - interval decrease in size of a fluid collection in the region of the previously perforated appendicitis measuring 2.8 x 1.5 cm previously measuring 4.0 x 2.2 cm - slight increase in mild adjacent inflammatory change. The GI bleed was treated conservatively.  She was admitted on 8/29 with shortness of breath in the setting of back, neck and left lower extremity pain. She required NIV for mild acute hypercapnic respiratory failure that quickly resolved. Her respiratory status remained stable on room air and on her usual nebs and mucolytics. She was started on a puree diet with moderately thickened fluids in addition to PEG feeds following evaluation by the speech and swallow team. She was started on anastrazole for likely metastatic breast cancer to the bone. On the day of discharge, the patient was quite anxious with shortness of breath, chest congestion and wheeze - she was making a grunting noise with expiration. She was returned from [facility] that evening. Currently she is tachypneic and using accessory muscles of respiration. She has an audible wheeze and cough productive of scant sputum. No fevers, chills or sweats. No chest pain/pressure or palpitations.     the patient has new onset bronchospasm with increased work of breathing and worsening left lower lobe atelectasis -  she has severe tracheobronchomalacia that has resulted in mucous plugging with complete left lung collapse requiring several bronchoscopies for pulmonary toilet in the past - the patient was felt not to be a candidate for surgery for the tracheobronchomalacia and stenting was felt to have more risk than benefit     metastatic breast cancer    9/19 -  found to be COVID positive on discharge [facility]; awake and alert; now with marked worsening of shortness of breath and using her accessory muscles of respiration; severe chest congestion and wheeze exacerbated by neck flexion; increasing pCO2, WBC and lactate; occasional cough productive of scant sputum; no fevers, chills or sweats; no chest pain/pressure or palpitations; back on PEG feeds; switched from nebs to inhalers yesterday as Dr. Dexter was informed by nursing that COVID positive patients cannot receive nebs at [facility]    9/21 - spent the day on AVAPS -> removed this AM with somewhat less increased work of breathing and decreased chest congestion and wheeze; VBG not sent this AM; in airborne and contact isolation due to hospital acquired COVID infection; awake and alert; occasional cough productive of scant sputum; no fevers, chills or sweats; no chest pain/pressure or palpitations;     PLAN/RECOMMENDATIONS:    trial off AVAPS ->   VBG 9/19 -> 7.34/53/50/29/80.0 - worsening respiratory acidosis with lactate 3.5 -> repeat  repeat CXR 9/19 -> left basilar hazy opacities c/w atelectasis - low lung volumes bilaterally  remdesivir x 5 days - following renal and liver function  solumedrol 20mg IV q6h  albuterol/atrovent nebs q4h  pulmicort 0.5mg nebs q12h - give after duoneb - rinse mouth after use  guaifenesin 300mg 4 times daily via PEG  incentive spirometry   acapella device   follow inflammatory markers - CRP - ferritin - d-dimer -> all within normal limits  CT scan 8/31 -> multiple lytic lesions are seen throughout the axial and appendicular skeleton, some of which appear increased in size from CT abdomen pelvis 3/16/2022 when evaluated in retrospect - a few lytic lesions involve the left intertrochanteric region and right acetabulum  oncology follow-up noted    s/p right simple mastectomy 4/2019 for breast cancer but did not receive adjuvant hormonal therapy    it is likely that the patient has metastatic breast cancer    started on anastrozole as the patient could not tolerate a MRI and is not a candidate for bone biopsy  treatment of HTN, HLD, CAD, aortic stenosis per cardiology  DVT prophylaxis - xarelto  glucose control  analgesics    Will follow with you. Plan of care discussed with the patient and her RN at bedside and the dedicated floor NP.     Kennedy Marcano MD, [facility]  [phone]  Pulmonary Medicine

## 2022-09-22 NOTE — PROGRESS NOTE ADULT - ASSESSMENT
Mr. 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  Pt was admitted for management of Acute on Chronic respiratory failure 2/2 severe tracheomalacia and bronchospasm. Pt was managed with NIV and steroids. Pt was tested for DC planning yesterday and found to be COVID positive and ID consulted for the same.    WORKUP  COVID-19 PCR: Detected (09-16-22 @ 18:18)  COVID-19 PCR: NotDetec (09-13-22 @ 08:24)    DIAGNOSIS and IMPRESSION  ·	Nosocomial COVID 19  ·	Acute on Chronic respiratory failure 2/2 severe tracheomalacia and bronchospasm.  ·	metastatic breast cancer to bones s/p mastectomy (pt was unable to tolerate further cancer workup of spine lesions).     Pt started on remdesevir (9/17)  Pt with stable O2 requirements 3 L during day and BiPAP at night  Pt was transitioned from her prednisone taper to solumedrol 20 IV Q6 by pulm.    s/p remdesevir   On methylprednisolone per pulm  Continue supplemental O2 and supportive care per primary team  Continue Contact and Airborne Isolation precautions    John Fragoso  Attending Physician   Division of Infectious Disease  Office #[phone]  Available on [employer] Teams also  After 5pm/weekend or no response, call #[phone]

## 2022-09-22 NOTE — PROGRESS NOTE ADULT - ASSESSMENT
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 She also has a history of HTN, HLD, CAD s/p MI with preserved LVEF and moderate aortic stenosis. The patient was admitted to [facility] in December 2021 with fever and abdominal pain due to perforated appendicitis. She was treated with tube drainage and antibiotics for a polymicrobial infection. Hospital course was complicated by respiratory failure due to mucous plugging with complete collapse of the left lung. She underwent several bronchoscopies for pulmonary toilet and was found to have severe tracheobronchomalacia. Her respiratory status has remained "stable" while at [facility] on nebulized bronchodilators and hypertonic saline and without nocturnal NIV. She was admitted in March with hematochezia. The left lower lobe was well aerated on a CT scan of the abdomen and pelvis with only bibasilar subsegmental atelectasis - there was scattered sigmoid diverticulosis without evidence of diverticulitis - interval decrease in size of a fluid collection in the region of the previously perforated appendicitis measuring 2.8 x 1.5 cm previously measuring 4.0 x 2.2 cm - slight increase in mild adjacent inflammatory change. The GI bleed was treated conservatively.  She was admitted on 8/29 with shortness of breath in the setting of back, neck and left lower extremity pain. She required NIV for mild acute hypercapnic respiratory failure that quickly resolved. Her respiratory status remained stable on room air and on her usual nebs and mucolytics. She was started on a puree diet with moderately thickened fluids in addition to PEG feeds following evaluation by the speech and swallow team. She was started on anastrazole for likely metastatic breast cancer to the bone. On the day of discharge, the patient was quite anxious with shortness of breath, chest congestion and wheeze - she was making a grunting noise with expiration. She was returned from [facility] that evening. Currently she is tachypneic and using accessory muscles of respiration. She has an audible wheeze and cough productive of scant sputum. No fevers, chills or sweats. No chest pain/pressure or palpitations.     the patient has new onset bronchospasm with increased work of breathing and worsening left lower lobe atelectasis -  she has severe tracheobronchomalacia that has resulted in mucous plugging with complete left lung collapse requiring several bronchoscopies for pulmonary toilet in the past - the patient was felt not to be a candidate for surgery for the tracheobronchomalacia and stenting was felt to have more risk than benefit     metastatic breast cancer    9/19 -  found to be COVID positive on discharge [facility]; awake and alert; now with marked worsening of shortness of breath and using her accessory muscles of respiration; severe chest congestion and wheeze exacerbated by neck flexion; increasing pCO2, WBC and lactate; occasional cough productive of scant sputum; no fevers, chills or sweats; no chest pain/pressure or palpitations; back on PEG feeds; switched from nebs to inhalers yesterday as Dr. Dexter was informed by nursing that COVID positive patients cannot receive nebs at [facility]    9/21 - spent the day on AVAPS -> removed this AM with somewhat less increased work of breathing and decreased chest congestion and wheeze; VBG not sent this AM; in airborne and contact isolation due to hospital acquired COVID infection; awake and alert; occasional cough productive of scant sputum; no fevers, chills or sweats; no chest pain/pressure or palpitations;     PLAN/RECOMMENDATIONS:    trial off AVAPS ->   ABG -> 7.51/33/158/100%% - respiratory alkalosis on AVAPS  repeat CXR 9/19 -> left basilar hazy opacities c/w atelectasis - low lung volumes bilaterally  has completed a 5 day course of remdesivir - following renal and liver function which remain normal  continue solumedrol 20mg IV q6h  continue albuterol/atrovent nebs q4h  continue pulmicort 0.5mg nebs q12h  guaifenesin 300mg 4 times daily via PEG  incentive spirometry   acapella device   follow inflammatory markers - CRP - ferritin - d-dimer -> all within normal limits  CT scan 8/31 -> multiple lytic lesions are seen throughout the axial and appendicular skeleton, some of which appear increased in size from CT abdomen pelvis 3/16/2022 when evaluated in retrospect - a few lytic lesions involve the left intertrochanteric region and right acetabulum  oncology follow-up noted    s/p right simple mastectomy 4/2019 for breast cancer but did not receive adjuvant hormonal therapy    it is likely that the patient has metastatic breast cancer    started on anastrozole as the patient could not tolerate a MRI and is not a candidate for bone biopsy  treatment of HTN, HLD, CAD, aortic stenosis per cardiology  DVT prophylaxis - xarelto  glucose control  bowel regimen  analgesics  keppra    Will follow with you. Plan of care discussed with the patient and her RN at bedside and the dedicated floor NP.     Kennedy Marcano MD, [facility]  [phone]  Pulmonary Medicine

## 2022-09-22 NOTE — PROGRESS NOTE ADULT - SUBJECTIVE AND OBJECTIVE BOX
CARDIOLOGY     PROGRESS  NOTE   ________________________________________________    CHIEF COMPLAINT:Patient is a 87y old  Female who presents with a chief complaint of SOB (22 Sep 2022 14:08)  comfortable.  	  REVIEW OF SYSTEMS:  CONSTITUTIONAL: No fever, weight loss, or fatigue  EYES: No eye pain, visual disturbances, or discharge  ENT:  No difficulty hearing, tinnitus, vertigo; No sinus or throat pain  NECK: No pain or stiffness  RESPIRATORY: No cough, wheezing, chills or hemoptysis; +Shortness of Breath  CARDIOVASCULAR: No chest pain, palpitations, passing out, dizziness, or leg swelling  GASTROINTESTINAL: No abdominal or epigastric pain. No nausea, vomiting, or hematemesis; No diarrhea or constipation. No melena or hematochezia.  GENITOURINARY: No dysuria, frequency, hematuria, or incontinence  NEUROLOGICAL: No headaches, memory loss, loss of strength, numbness, or tremors  SKIN: No itching, burning, rashes, or lesions   LYMPH Nodes: No enlarged glands  ENDOCRINE: No heat or cold intolerance; No hair loss  MUSCULOSKELETAL: No joint pain or swelling; No muscle, back, or extremity pain  PSYCHIATRIC: No depression, anxiety, mood swings, or difficulty sleeping  HEME/LYMPH: No easy bruising, or bleeding gums  ALLERGY AND IMMUNOLOGIC: No hives or eczema	    [ ] All others negative	  [ ] Unable to obtain    PHYSICAL EXAM:  T(C): 37.2 (09-22-22 @ 11:23), Max: 37.2 (09-22-22 @ 11:23)  HR: 111 (09-22-22 @ 11:23) (88 - 111)  BP: 126/48 (09-22-22 @ 11:23) (96/67 - 126/48)  RR: 29 (09-22-22 @ 11:23) (20 - 29)  SpO2: 97% (09-22-22 @ 11:23) (90% - 97%)  Wt(kg): --  I&O's Summary    21 Sep 2022 07:01  -  22 Sep 2022 07:00  --------------------------------------------------------  IN: 250 mL / OUT: 950 mL / NET: -700 mL    22 Sep 2022 07:01  -  22 Sep 2022 19:48  --------------------------------------------------------  IN: 800 mL / OUT: 300 mL / NET: 500 mL        Appearance: Normal	  HEENT:   Normal oral mucosa, PERRL, EOMI	  Lymphatic: No lymphadenopathy  Cardiovascular: Normal S1 S2, No JVD,+ murmurs, No edema  Respiratory: rhonchi  Gastrointestinal:  Soft, Non-tender, + BS	  Skin: No rashes, No ecchymoses, No cyanosis	  Extremities: Normal range of motion, No clubbing, cyanosis or edema  Vascular: Peripheral pulses palpable 2+ bilaterally    MEDICATIONS  (STANDING):  albuterol/ipratropium for Nebulization 3 milliLiter(s) Nebulizer <User Schedule>  allopurinol 100 milliGRAM(s) Oral daily  anastrozole 1 milliGRAM(s) Oral daily  atorvastatin 20 milliGRAM(s) Oral at bedtime  bisacodyl 5 milliGRAM(s) Oral at bedtime  buDESOnide    Inhalation Suspension 0.5 milliGRAM(s) Inhalation every 12 hours  chlorhexidine 2% Cloths 1 Application(s) Topical <User Schedule>  diltiazem    Tablet 30 milliGRAM(s) Oral three times a day  guaiFENesin Oral Liquid (Sugar-Free) 300 milliGRAM(s) Oral every 6 hours  insulin lispro (ADMELOG) corrective regimen sliding scale   SubCutaneous every 6 hours  levETIRAcetam  Solution 750 milliGRAM(s) Oral two times a day  levothyroxine 75 MICROGram(s) Oral daily  melatonin 3 milliGRAM(s) Oral at bedtime  methylPREDNISolone sodium succinate Injectable 20 milliGRAM(s) IV Push every 6 hours  rivaroxaban 10 milliGRAM(s) Oral daily  senna 2 Tablet(s) Oral at bedtime      TELEMETRY: 	    ECG:  	  RADIOLOGY:  OTHER: 	  	  LABS:	 	    CARDIAC MARKERS:            09-21    x   |  x   |  x   ----------------------------<  x   x    |  x   |  0.50      TPro  6.8  /  Alb  3.6  /  TBili  0.5  /  DBili  0.2  /  AST  14  /  ALT  16  /  AlkPhos  234<H>  09-21    proBNP: Serum Pro-Brain Natriuretic Peptide: 461 pg/mL (09-08 @ 20:56)  Serum Pro-Brain Natriuretic Peptide: 155 pg/mL (08-27 @ 21:06)    Lipid Profile:   HgA1c:   TSH:   PT/INR - ( 21 Sep 2022 06:39 )   PT: 12.8 sec;   INR: 1.11 ratio               Assessment and plan  ---------------------------  86F w/ extensive hx including severe tracheobronchomalacia (c/b hypoxia 2/2 mucous plugging and recurrent L lung collapse), 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 Very limited hx obtained from pt given mental status, dyspnea, and use of BIPAP. Unable to reach sonSy. History obtained from staff/chart review.    Per ED staff who saw pt when she first arrived, pt was notably dyspneic with increased WOB and wheezing. Supposedly missed use of nightly BIPAP at rehab. Reportedly denied fever and cough. On encounter for admission, pt with BIPAP mask on, appearing slightly lethargic and mildly dyspneic, but was able to answer some questions. Pt endorsed having SOB. Denied pain. Seemed to believe initially that she was at [facility]? but was difficult to understand her responses.    Of note, pt has multiple recent admissions with similar presentation. Most recently was hospitalized from 8/28/22-9/8/22 for SOB, treated with airway clearance and completed 5-day course of Zosyn for empiric coverage of PNA (though per Pulm, unlikely to have PNA). Course c/b PEG displacement and subsequent reinsertion by IR on 8/30. In addition to her usual PEG feeds, pt was also started on puree diet with moderately thickened fluids for pleasure feeds. Also was started on anastrozole for most likely dx of metastatic breast cancer to bones (pt was unable to tolerate further cancer workup of spine lesions). Pt remained full code during recent admissions.  pt with metastatic ca to the bone with sob sec to obesity and underlying lung and cardiac disease  increase sob sec to missing BiPAP at night  may consider pulmonary eval  may consider hospice care/ palliative care  continue Diltiazem  pt will need home o2 and Bipap at night  discussed with family at the bed side   dvt prophylaxis high risk on xarelto  pt with bone mets, no further nieto/ onc noted  on steroid, taper gradually ?change to po

## 2022-09-22 NOTE — PROGRESS NOTE ADULT - ASSESSMENT
89    year old female    h/o hemorrhagic CVA, has  PEG,  HTN, MI,   HLD, gout, right ca  breast   right Ca breast. s/p mastectomy in 2019,  s/p  sentinel node  localization.  4/4,  were  negative  peg dislodged.  IR   replacements  on 1/3/22  s/p rrt  . in past,  for hypoxia. cxr with complete  collapsed  of  left lung, needing broch,   s/ p  bronchoscopy , pt  with  tracheomalacia  and  collapsed  airways,  makes  this a  difficult  situation, as there is no good rx for this     h/o bacteremia. on   pna, perforated  appendicitis,  ?  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 this will not alter rx. pt  redvd  extended  course  of ab   on iv  vanco and ertapenem.  till  2/9/.22.        *  admitted with   presumed  resp  failure/ pt was  sent back from [facility], the same day   pt seen in er.  appears   at her naseline.  no  wheezing /  atousable    ENT eval w/ laryngoscopy notable for vocal cords mobile and intact, no edema, upper airway patent     hypertonic saline for airway clearance   cxr, no pna     *   s/p cva, has  PEG,  npo  ,   on  synthroid, Keppra  ,  *   HTN, on  cardizem,  per  card dr jamison  *  h/o  Ca breast. /, s/p  R  mastectomy  *   obesity, bmi  was   41, now  is  30   *     c/c left  leg contracture ,  remains  bed  bound. functional  paraplegia,  needs  assistance  for  all her  adl's   *  pt  with  h/o  tracheomalacia  and  collapsed  airways,      given pt;s  mlple co morbidities,  propensity  for  lung collpse,   pt is  at risk for  re admissions     on nocturnal  bipap    difficult  situation, a s there  is no  good  rx  for  pt's  recurrent lung collapse hypoxia     h/o  of  chronic    mild  tachycardia          *   CT chest. lytic  lesions, T  spine/  ribs/ humerus/  oncoloigy  magnus pacheco.  suspect  metastatic ca breast        son. guicho Dan/ oncologist  has  also  discussed  with  son,  futility of  pursuing  bx. a s pt  is  not an ideal   candidate  for  chemo    CT  A.p ,/ prelim ,  pelvic  mets  per  oncology,   suspect  metastatic ca  breast,  was  awaiting   mri  spine/  pt unable  to tolerate  mri   per oncology, no  further  intervention/  and on  Arimidex, now,   per d r ohri   obesity,  bed bound.  functional paraplegia    per  son,   rehab promptly  returned   to er/ as they  did not  have  a  bipap machine /  care cortn to f/.p, needs  24/7  O2/ nocturnal bipap   difficult  situation,  bed  bound. obesity. c/c  hypercarbia, needing , prn /  nocturnal bipap/  with intermittent tachypnea    now  is  covid +. on iv steroid/ wheezing/ remdissivir/, seen by  house  ID     dvt ppx/  xarelto  for   30  days/  remains on iv steroid  pt  with  frequent episodes  of tachypnea. has  obstructive /restrictive   lung  disease ,  obesity/ RAQUEL/ hypercarbia  will  speak  again  with son regarding   dnr   remains on iv  steroid,  difficult  situation   called son, caseyple  times,  message  left/  can  only   start   d/c  planning,  when cleraed  by  pulm      per  son, pt is  full code

## 2022-09-22 NOTE — PROGRESS NOTE ADULT - SUBJECTIVE AND OBJECTIVE BOX
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; PEG in place    INTERVAL HISTORY: remains on AVAPS which was placed last night for sleep; breathing comfortably without increased work of breathing; spent the day yesterday on room air without dyspnea or hypoxemia; in airborne and contact isolation due to hospital acquired COVID infection; awake and alert; occasional cough productive of scant sputum; improving chest congestion or wheeze; no fevers, chills or sweats; no chest pain/pressure or palpitations; receiving PEG feeds when off NIV - she would like to eat      REVIEW OF SYSTEMS:  Constitutional: As per interval history  HEENT: Within normal limits  CV: As per interval history  Resp: As per interval history  GI: dysphagia  : Within normal limits  Musculoskeletal: Within normal limits  Skin: Within normal limits  Neurological: CVA - left sided plegia  Psychiatric: Within normal limits  Endocrine: Within normal limits  Hematologic/Lymphatic: Within normal limits  Allergic/Immunologic: Within normal limits    MEDICATIONS:     Pulmonary "  albuterol/ipratropium for Nebulization 3 milliLiter(s) Nebulizer <User Schedule>  buDESOnide    Inhalation Suspension 0.5 milliGRAM(s) Inhalation every 12 hours  guaiFENesin Oral Liquid (Sugar-Free) 300 milliGRAM(s) Oral every 6 hours    Anti-microbials:    Cardiovascular:  diltiazem    Tablet 30 milliGRAM(s) Oral three times a day    Other:  allopurinol 100 milliGRAM(s) Oral daily  anastrozole 1 milliGRAM(s) Oral daily  atorvastatin 20 milliGRAM(s) Oral at bedtime  bisacodyl 5 milliGRAM(s) Oral at bedtime  chlorhexidine 2% Cloths 1 Application(s) Topical <User Schedule>  insulin lispro (ADMELOG) corrective regimen sliding scale   SubCutaneous every 6 hours  levETIRAcetam  Solution 750 milliGRAM(s) Oral two times a day  levothyroxine 75 MICROGram(s) Oral daily  melatonin 3 milliGRAM(s) Oral at bedtime  methylPREDNISolone sodium succinate Injectable 20 milliGRAM(s) IV Push every 6 hours  rivaroxaban 10 milliGRAM(s) Oral daily  senna 2 Tablet(s) Oral at bedtime    MEDICATIONS  (PRN):  acetaminophen     Tablet .. 650 milliGRAM(s) Oral every 6 hours PRN Temp greater or equal to 38C (100.4F), Mild Pain (1 - 3)  polyethylene glycol 3350 17 Gram(s) Oral daily PRN Constipation  traMADol 50 milliGRAM(s) Oral two times a day PRN Severe Pain (7 - 10)        OBJECTIVE:    POCT Blood Glucose.: 138 mg/dL (22 Sep 2022 07:30)  POCT Blood Glucose.: 166 mg/dL (22 Sep 2022 05:57)  POCT Blood Glucose.: 196 mg/dL (21 Sep 2022 23:36)  POCT Blood Glucose.: 190 mg/dL (21 Sep 2022 21:20)  POCT Blood Glucose.: 139 mg/dL (21 Sep 2022 16:51)  POCT Blood Glucose.: 115 mg/dL (21 Sep 2022 12:14)      PHYSICAL EXAM:       ICU Vital Signs Last 24 Hrs  T(C): 36.6 (22 Sep 2022 07:16), Max: 36.9 (21 Sep 2022 10:44)  T(F): 97.9 (22 Sep 2022 07:16), Max: 98.4 (21 Sep 2022 10:44)  HR: 100 (22 Sep 2022 07:16) (78 - 109)  BP: 108/69 (22 Sep 2022 07:16) (96/67 - 134/97)  BP(mean): --  ABP: --  ABP(mean): --  RR: 20 (22 Sep 2022 07:16) (20 - 30)  SpO2: 93% (22 Sep 2022 07:16) (90% - 100%) on AVAPS FiO2 35%    General: Awake. Alert. Cooperative. No distress. Appears stated age. Obese. Bedbound. AVAPS  HEENT: Atraumatic. Normocephalic. Anicteric. Normal oral mucosa. PERRL. EOMI. Mallampati class IV airway. Poor dentition. Full face mask.  Neck: Supple. Trachea midline. Thyroid without enlargement/tenderness/nodules. No carotid bruit. No JVD. Short and wide. Neck flexed and chin resting on her anterior chest.  Cardiovascular: Regular rate and rhythm. S1 S2 normal. III/VI systolic murmur  Respiratory: No respiratory distress. Decreased bilateral course breath sounds and wheeze. Kyphosis. Poor chest wall excursion  Abdomen: Soft. Non-tender. Non-distended. No organomegaly. No masses. Normal bowel sounds. Obese. PEG.  Extremities: Warm to touch. No clubbing or cyanosis. No pedal edema. Large legs. Left leg contracted under the right leg  Pulses: 2+ peripheral pulses all extremities.	  Skin: Normal skin color. No rashes or lesions. No ecchymoses. No cyanosis. Warm to touch.  Lymph Nodes: Cervical, supraclavicular and axillary nodes normal  Neurological: Left lower extremity plegia with contraction. Left upper extremity is quite weak. A and O x 3    LABS:      CBC    WBC  20.10 <==, 9.55 <==, 9.07 <==    Hemoglobin  9.7 <<==, 8.1 <<==, 7.9 <<==    Hematocrit  32.4 <==, 27.0 <==, 27.2 <==    Platelets  337 <==, 298 <==, 312 <==      x   |  x   |  x   ----------------------------<  x     09-21  x    |  x   |  0.50      LYTES    sodium  140 <==, 135 <==, 136 <==, 136 <==    potassium   5.0 <==, 4.7 <==, 4.5 <==, 5.1 <==    chloride  101 <==, 98 <==, 99 <==, 98 <==    carbon dioxide  26 <==, 24 <==, 29 <==, 27 <==    =============================================================================================  RENAL FUNCTION:    Creatinine:   0.50  <<==, 0.66  <<==, 0.57  <<==, 0.59  <<==, 0.59  <<==    BUN:   42 <==, 32 <==, 33 <==, 34 <==    ============================================================================================    calcium   9.3 <==, 9.1 <==, 9.2 <==, 9.0 <==    ===========================================================================================  LFTs    AST:   14 <== , 19 <== , 11 <== , 22 <==     ALT:  16  <== , 17  <== , 12  <== , 11  <==     AP:  234  <=, 230  <=, 204  <=, 158  <=    Bili:  0.5  <=, 0.4  <=, 0.2  <=, 0.2  <=    PT/INR - ( 21 Sep 2022 06:39 )   PT: 12.8 sec;   INR: 1.11 ratio      ABG - ( 21 Sep 2022 14:19 )  pH: 7.51  /  pCO2: 33    /  pO2: 158   / HCO3: 26    / Base Excess: 3.6   /  SaO2: 100.0     Venous Blood Gas:  09-19 @ 09:30  7.34/53/50/29/80.0  VBG Lactate: 3.5    Venous Blood Gas:  09-18 @ 03:50  7.39/48/47/29/78.2  VBG Lactate: 1.7    Procalcitonin, Serum: 0.23 ng/mL (09-18 @ 09:59)    D-Dimer Assay, Quantitative (09.18.22 @ 09:59)   D-Dimer Assay, Quantitative: 196:    C-Reactive Protein, Serum (09.18.22 @ 09:59)   C-Reactive Protein, Serum: 5 mg/L     Ferritin, Serum in AM (09.18.22 @ 09:59)   Ferritin, Serum: 63 ng/mL     < from: TTE with Doppler (w/Cont) (01.21.22 @ 14:08) >    Patient name: FRANKI MEHTA  YOB: 1935   Age: 86 (F)   MR#: [id number]  Study Date: 1/21/2022  ------------------------------------------------------------------------  Dimensions:    Normal Values:  LA:     2.5    2.0 - 4.0 cm  Ao:     3.3    2.0 - 3.8 cm  SEPTUM: 1.6    0.6 - 1.2 cm  PWT:    0.9    0.6 - 1.1 cm  LVIDd:  3.6    3.0 - 5.6 cm  LVIDs:  2.5    1.8 - 4.0 cm  Derived variables:  LVMI: 77 g/m2  RWT: 0.50  Fractional short: 31 %  EF (Visual Estimate): 70-75 %  ------------------------------------------------------------------------  Conclusions:  1. Concentric left ventricular hypertrophy.  2. Hyperdynamic left ventricular systolic function.  Endocardial visualization enhanced with intravenous  injection of Ultrasonic Enhancing Agent (Definity).  3. The right ventricle is not well visualized; grossly  normal right ventricular systolic function.  Pt refused to proceed with exam.  Limited Doppler study.  No trans aortic gradients.  Unable to rule out endocarditis.  ------------------------------------------------------------------------  Confirmed on 1/21/2022 - 15:42:57 by COMPA CastilloE  ------------------------------------------------------------------------  ---------------------------------------------------------------------------------------------------------------    MICROBIOLOGY:     COVID-19 PCR . (09.19.22 @ 18:31)   COVID-19 PCR: Detected:    Respiratory Viral Panel with COVID-19 by RYAN (09.08.22 @ 21:38)   Rapid RVP Result: [facility]   SARS-CoV-2: [facility]  This Respiratory Panel uses polymerase chain reaction (PCR) to detect for   adenovirus; coronavirus (HKU1, NL63, 229E, OC43); human metapneumovirus   (hMPV); human enterovirus/rhinovirus (Entero/RV); influenza A; influenza   A/H1; influenza A/H3; influenza A/H1-2009; influenza B; parainfluenza   viruses 1, 2, 3, 4; respiratory syncytial virus; Mycoplasma pneumoniae;   Chlamydophila pneumoniae; and SARS-CoV-2.     Culture - Blood (09.08.22 @ 20:39)   Specimen Source: .Blood Blood-Peripheral   Culture Results:   No growth to date.     Culture - Blood (09.08.22 @ 20:25)   Specimen Source: .Blood Blood-Peripheral   Culture Results:   No growth to date.     RADIOLOGY:  [x ] Chest radiographs reviewed and interpreted by me    EXAM:  XR CHEST PORTABLE URGENT 1V                          PROCEDURE DATE:  09/19/2022      FINDINGS:  Radiographic interpretation is limited by patient positioning and   overlying artifact.  Gastrostomy tube is noted.  Left humeral fixation plate and screws are noted.  The heart size cannot be accurately assessed in this projection.  Left basilar hazy opacities  Low lung volumes bilaterally.  There is no pneumothorax or large pleural effusion.  No acute bony abnormality.    IMPRESSION:  Left basilar hazy opacity may represent atelectasis.    AMY JARAMILLO MD; Resident Radiologist  This document has been electronically signed.  DIANE CLARK MD; Attending Radiologist  This document has been electronically signed. Sep 19 2022  6:08PM  ---------------------------------------------------------------------------------------------------------------    EXAM:  XR CHEST PORTABLE URGENT 1V                          PROCEDURE DATE:  09/12/2022      FINDINGS:    Gastrostomy tube.  The heart is enlarged.  Patchy left mid and lower lung opacity is minimally increased. The right   lung is clear.  No pleural effusion or pneumothorax.    IMPRESSION:  Minimally increased left mid to lower lung atelectasis..    ROBSON PIZANO MD; Resident Radiology  This document has been electronically signed.  DIANE CLARK MD; Attending Radiologist  This document has been electronically signed. Sep 12 2022  2:46PM  --------------------------------------------------------------------------------------------------------------  EXAM:  CT CHEST                          PROCEDURE DATE:  09/14/2022      FINDINGS:    LUNGS AND AIRWAYS: Patent central airways.  Subsegmental atelectasis is   seen in the bilateral upper and lower lobes.  PLEURA: No pleural effusion.  MEDIASTINUM AND GIOVANA: No lymphadenopathy.  VESSELS: Calcifications of the aorta, aortic valve and coronary arteries.  HEART: Heart size is normal. No pericardial effusion.  CHEST WALL AND LOWER NECK: Within normal limits.  VISUALIZED UPPER ABDOMEN: Cholelithiasis.  BONES: Multiple lytic bone lesions are again seen including a destructive   lesion at the level of the T5 vertebral body that may extend into the   left neural foramen (3:42), unchanged from prior CT chest.    IMPRESSION:  Subsegmental atelectasis in the bilateral upper and lower lobes.    Lytic bone lesions as described above, unchanged from prior CT chest   8/29/2022. Recommend MR thoracic spine for further evaluation.     ALEXANDRIA COLLINS MD; Resident Radiologist  This document has been electronically signed.  HAY IRVIN MD; Attending Radiologist  This document has been electronically signed. Sep 14 2022  3:23PM  ---------------------------------------------------------------------------------------------------------------  EXAM:  DUPLEX SCAN EXT VEINS LOWER BI                          PROCEDURE DATE:  09/10/2022      IMPRESSION:  Limited study  No evidence of deep venous thrombosis in either lower extremity.  Limited visualization of the calf veins.    KEYLA ROMERO MD; Attending Radiologist  This document has been electronically signed. Sep 10 2022 10:42AM  ---------------------------------------------------------------------------------------------------------------  EXAM:  CT ABDOMEN AND PELVIS IC                          PROCEDURE DATE:  08/31/2022      IMPRESSION:    Multiple lytic lesions are seen throughout the axial and appendicular   skeleton, some of which appear increased in size from CT abdomen pelvis   3/16/2022 when evaluated in retrospect. A few lytic lesions involve the   left intertrochanteric region and right acetabulum.    Endometrial thickening. Recommend dedicated pelvic sonogram.    Cystic lesions are seen within the pancreas. Recommend a dedicated MRI on   a nonemergent basis.    SULY KENDRICK MD; Resident Radiology  This document has been electronically signed.  BRITTANI MALCOLM MD; Attending Radiologist  This document has been electronically signed. Sep  1 2022 11:01AM  ---------------------------------------------------------------------------------------------------------------  EXAM:  XR HUMERUS MIN 2 VIEWS RT                          PROCEDURE DATE:  08/30/2022      EXAM:  Internal/external rotation AP right humerus from 8/30/2022 at 0927.   Compared to appearance on previous day chest CT.    IMPRESSION:  Generalized osteopenia. Redemonstrated focal lytic lesion in proximal   medial humeral diaphysis with small area of permeative cortical   destruction. No additional radiographically appreciated suspicious lytic   or blastic lesions in remaining imaged regions.    No current fractures or dislocations.    Preserved shoulder and elbow joint spaces.    IV cannula with attached tubing overlies upper arm.    MINE NICE MD; Attending Radiologist  This document has been electronically signed. Aug 30 2022 10:39AM  ---------------------------------------------------------------------------------------------------------------  EXAM:  DUPLEX EXT VEINS UPPER LT                          PROCEDURE DATE:  09/01/2022      IMPRESSION:  No evidence of left upper extremity deep venous thrombosis.    FAWN FITZGERALD MD; Attending Radiologist  This document has been electronically signed. Sep  1 2022  1:10PM  ---------------------------------------------------------------------------------------------------------------  EXAM:  DUPLEX SCAN EXT VEINS LOWER BI                          PROCEDURE DATE:  08/31/2022      IMPRESSION:  No evidence of deep venous thrombosis in either lower extremity.    KEYLA ROMERO MD; Attending Radiologist  This document has been electronically signed. Aug 31 2022  7:59PM  ---------------------------------------------------------------------------

## 2022-09-22 NOTE — CHART NOTE - NSCHARTNOTEFT_GEN_A_CORE
Nutrition Follow Up Note  Patient seen for: TF consult   Chart reviewed, events noted.    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 Very limited hx obtained from pt given mental status, dyspnea, and use of BIPAP. Unable to reach Sy mcclain. History obtained from staff/chart review."    Source: [] Patient       [x] EMR        [x] RN        [] Family at bedside       [] Other:    -If unable to interview patient: [] Trach/Vent/BiPAP  [x] Disoriented/confused/inappropriate to interview    Diet Order:   Diet, NPO with Tube Feed:   Tube Feeding Modality: Gastrostomy  Jevity 1.5 Naseem (JEVITY1.5RTH)  Total Volume for 24 Hours (mL): 1260  Continuous  Starting Tube Feed Rate {mL per Hour}: 10  Increase Tube Feed Rate by (mL): 10     Every 4 hours  Until Goal Tube Feed Rate (mL per Hour): 70  Tube Feed Duration (in Hours): 18  Tube Feed Start Time: 06:00 (09-09-22)    - Is current order appropriate/adequate? [] Yes  []  No:     - PO intake :   [] >75%  Adequate    [] 50-75%  Fair       [] <50%  Poor  Per RN, and RD observed, pt tolerating TF at goal; at goal TF provides: 1260 ml, 1890 kcal (20 kcal/kg), 80 g/pro (0.9 g/pro/kg), 958 ml free water. defer water flushes to team.  based on dosing wt    - Nutrition-related concerns:  - noted <H> (9/19); Team to provide/adjust insulin as needed to help maintain BG WNL     GI:  Last BM 9/21    Bowel Regimen? [x] Yes   [] No  no noted GI distress at this time     Weights:   Daily N/A    Drug Dosing Weight  Height (cm): 162.6 (08 Sep 2022 20:27)  Weight (kg): 93.213 (09 Sep 2022 15:52)  BMI (kg/m2): 35.3 (09 Sep 2022 15:52)  BSA (m2): 1.98 (09 Sep 2022 15:52)    MEDICATIONS  (STANDING):  albuterol/ipratropium for Nebulization 3 milliLiter(s) Nebulizer <User Schedule>  allopurinol 100 milliGRAM(s) Oral daily  anastrozole 1 milliGRAM(s) Oral daily  atorvastatin 20 milliGRAM(s) Oral at bedtime  bisacodyl 5 milliGRAM(s) Oral at bedtime  buDESOnide    Inhalation Suspension 0.5 milliGRAM(s) Inhalation every 12 hours  chlorhexidine 2% Cloths 1 Application(s) Topical <User Schedule>  diltiazem    Tablet 30 milliGRAM(s) Oral three times a day  guaiFENesin Oral Liquid (Sugar-Free) 300 milliGRAM(s) Oral every 6 hours  insulin lispro (ADMELOG) corrective regimen sliding scale   SubCutaneous every 6 hours  levETIRAcetam  Solution 750 milliGRAM(s) Oral two times a day  levothyroxine 75 MICROGram(s) Oral daily  melatonin 3 milliGRAM(s) Oral at bedtime  methylPREDNISolone sodium succinate Injectable 20 milliGRAM(s) IV Push every 6 hours  rivaroxaban 10 milliGRAM(s) Oral daily  senna 2 Tablet(s) Oral at bedtime    MEDICATIONS  (PRN):  acetaminophen     Tablet .. 650 milliGRAM(s) Oral every 6 hours PRN Temp greater or equal to 38C (100.4F), Mild Pain (1 - 3)  polyethylene glycol 3350 17 Gram(s) Oral daily PRN Constipation  traMADol 50 milliGRAM(s) Oral two times a day PRN Severe Pain (7 - 10)    Pertinent Labs:  (9/19)    Finger Sticks:  POCT Blood Glucose.: 138 mg/dL (09-22-22 @ 07:30)  POCT Blood Glucose.: 166 mg/dL (09-22-22 @ 05:57)  POCT Blood Glucose.: 196 mg/dL (09-21-22 @ 23:36)  POCT Blood Glucose.: 190 mg/dL (09-21-22 @ 21:20)  POCT Blood Glucose.: 139 mg/dL (09-21-22 @ 16:51)  POCT Blood Glucose.: 115 mg/dL (09-21-22 @ 12:14)    Skin per nursing documentation: +skin tear; no noted pressure injuries as per flowsheets   Edema: +2 generalized    Estimated Needs:   [x] no change since previous assessment  [] recalculated:   7151-2411 kcal/day (20-25 kcal/kg) based on dosing wt 93.2 kg  60-72 g/pro (1-1.2 g/pro/kg) based on IBW +10%, 59.8 kg    Previous Nutrition Diagnosis: Swallowing Difficulty  Nutrition Diagnosis is: [x] ongoing  [] resolved [] not applicable     New Nutrition Diagnosis: [x] Not applicable    Nutrition Care Plan:  [x] In Progress- addressed with diet order, PO encouragement and nutritional supplements   [] Achieved  [] Not applicable    Nutrition Interventions:     Education Provided:       [] Yes:  [] No: education not warranted/appropriate at this time        Recommendations:      - RD recommend to continue current TF regimen at goal, continues feeds Jevity 1.5 at 70ml/hr x18 hr. IF NO ASPIRATION risks, and within GOC, if bolus feeds considered: jevity 1.5 x5cans /day, provides:  1185 ml, 1778 kcal (19 kcal/kg)  76 g/pro (0.8 g/pro/kg), 901 ml free water. defer fluids to team  - Monitor GI tolerance. RD to remain available to adjust EN formulary, volume/rate PRN.   - Team to provide/adjust insulin as needed to help maintain BG WNL   - monitor bowel movement and adust bowel regimen as indicated, defer to team  - Nutrition care plan to remain consistent with pt goals of care    Monitoring and Evaluation:   Continue to monitor nutritional intake, tolerance to diet prescription, weights, labs, skin integrity    RD remains available upon request and will follow up per protocol  Paola Comer RD CDN #[id number] Nutrition Follow Up Note  Patient seen for: TF consult   Chart reviewed, events noted.    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 Very limited hx obtained from pt given mental status, dyspnea, and use of BIPAP. Unable to reach Sy mcclain. History obtained from staff/chart review."    Source: [] Patient       [x] EMR        [x] RN        [] Family at bedside       [] Other:    -If unable to interview patient: [] Trach/Vent/BiPAP  [x] Disoriented/confused/inappropriate to interview    Diet Order:   Diet, NPO with Tube Feed:   Tube Feeding Modality: Gastrostomy  Jevity 1.5 Naseem (JEVITY1.5RTH)  Total Volume for 24 Hours (mL): 1260  Continuous  Starting Tube Feed Rate {mL per Hour}: 10  Increase Tube Feed Rate by (mL): 10     Every 4 hours  Until Goal Tube Feed Rate (mL per Hour): 70  Tube Feed Duration (in Hours): 18  Tube Feed Start Time: 06:00 (09-09-22)    - Is current order appropriate/adequate? [] Yes  []  No:     - PO intake :   [] >75%  Adequate    [] 50-75%  Fair       [] <50%  Poor  Per RN, pt was off TF yesterday per respiratory issues; TF resuemd this am now observed at 30ml, plan to increase every 8 hr by 20 ml until goal 70 ml/hr.  TF at goal; at goal TF provides: 1260 ml, 1890 kcal (20 kcal/kg), 80 g/pro (0.9 g/pro/kg), 958 ml free water. defer water flushes to team. (based on dosing wt for calories and IBW for pro).    - Nutrition-related concerns:  - noted <H> (9/19); Team to provide/adjust insulin as needed to help maintain BG WNL     GI:  Last BM 9/21    Bowel Regimen? [x] Yes   [] No  no noted GI distress at this time per RN    Weights:   Daily N/A    Drug Dosing Weight  Height (cm): 162.6 (08 Sep 2022 20:27)  Weight (kg): 93.213 (09 Sep 2022 15:52)  BMI (kg/m2): 35.3 (09 Sep 2022 15:52)  BSA (m2): 1.98 (09 Sep 2022 15:52)    MEDICATIONS  (STANDING):  albuterol/ipratropium for Nebulization 3 milliLiter(s) Nebulizer <User Schedule>  allopurinol 100 milliGRAM(s) Oral daily  anastrozole 1 milliGRAM(s) Oral daily  atorvastatin 20 milliGRAM(s) Oral at bedtime  bisacodyl 5 milliGRAM(s) Oral at bedtime  buDESOnide    Inhalation Suspension 0.5 milliGRAM(s) Inhalation every 12 hours  chlorhexidine 2% Cloths 1 Application(s) Topical <User Schedule>  diltiazem    Tablet 30 milliGRAM(s) Oral three times a day  guaiFENesin Oral Liquid (Sugar-Free) 300 milliGRAM(s) Oral every 6 hours  insulin lispro (ADMELOG) corrective regimen sliding scale   SubCutaneous every 6 hours  levETIRAcetam  Solution 750 milliGRAM(s) Oral two times a day  levothyroxine 75 MICROGram(s) Oral daily  melatonin 3 milliGRAM(s) Oral at bedtime  methylPREDNISolone sodium succinate Injectable 20 milliGRAM(s) IV Push every 6 hours  rivaroxaban 10 milliGRAM(s) Oral daily  senna 2 Tablet(s) Oral at bedtime    MEDICATIONS  (PRN):  acetaminophen     Tablet .. 650 milliGRAM(s) Oral every 6 hours PRN Temp greater or equal to 38C (100.4F), Mild Pain (1 - 3)  polyethylene glycol 3350 17 Gram(s) Oral daily PRN Constipation  traMADol 50 milliGRAM(s) Oral two times a day PRN Severe Pain (7 - 10)    Pertinent Labs:  (9/19)    Finger Sticks:  POCT Blood Glucose.: 138 mg/dL (09-22-22 @ 07:30)  POCT Blood Glucose.: 166 mg/dL (09-22-22 @ 05:57)  POCT Blood Glucose.: 196 mg/dL (09-21-22 @ 23:36)  POCT Blood Glucose.: 190 mg/dL (09-21-22 @ 21:20)  POCT Blood Glucose.: 139 mg/dL (09-21-22 @ 16:51)  POCT Blood Glucose.: 115 mg/dL (09-21-22 @ 12:14)    Skin per nursing documentation: +skin tear; no noted pressure injuries as per flowsheets   Edema: +2 generalized    Estimated Needs:   [x] no change since previous assessment  [] recalculated:   6831-7164 kcal/day (20-25 kcal/kg) based on dosing wt 93.2 kg  60-72 g/pro (1-1.2 g/pro/kg) based on IBW +10%, 59.8 kg    Previous Nutrition Diagnosis: Swallowing Difficulty  Nutrition Diagnosis is: [x] ongoing  [] resolved [] not applicable     New Nutrition Diagnosis: [x] Not applicable    Nutrition Care Plan:  [x] In Progress- addressed with EN diet order  [] Achieved  [] Not applicable    Nutrition Interventions:     Education Provided:       [] Yes:  [] No: education not warranted/appropriate at this time        Recommendations:      - RD RECOMMEND CONTINUES TF vs. bolus feeds. continues feeds Jevity 1.5 at 30 ml/hr increase q8hr by 20 ml, until goal 70 ml/hr x18 hr/day.  IF NO ASPIRATION risks, and within GOC, if bolus feeds considered: jevity 1.5 x5cans /day, provides:  1185 ml, 1778 kcal (19 kcal/kg)  76 g/pro (1.3 g/pro/kg), 901 ml free water (based on dosing wt for calories and IBW for pro). defer fluids to team   - Monitor GI tolerance. RD to remain available to adjust EN formulary, volume/rate PRN.   - Team to provide/adjust insulin as needed to help maintain BG WNL   - monitor bowel movement and adust bowel regimen as indicated, defer to team  - Nutrition care plan to remain consistent with pt goals of care    Monitoring and Evaluation:   Continue to monitor nutritional intake, tolerance to diet prescription, weights, labs, skin integrity    RD remains available upon request and will follow up per protocol  Paola Benjo, RD CDN #[phone]

## 2022-09-22 NOTE — PROGRESS NOTE ADULT - SUBJECTIVE AND OBJECTIVE BOX
afebrile    REVIEW OF SYSTEMS:  GEN: no fever,    no chills  RESP: no SOB,   no cough  CVS: no chest pain,   no palpitations  GI: no abdominal pain,   no nausea,   no vomiting,   no constipation,   no diarrhea  : no dysuria,   no frequency  NEURO: no headache,   no dizziness  PSYCH: no depression,   not anxious  Derm : no rash    MEDICATIONS  (STANDING):  albuterol/ipratropium for Nebulization 3 milliLiter(s) Nebulizer <User Schedule>  allopurinol 100 milliGRAM(s) Oral daily  anastrozole 1 milliGRAM(s) Oral daily  atorvastatin 20 milliGRAM(s) Oral at bedtime  bisacodyl 5 milliGRAM(s) Oral at bedtime  buDESOnide    Inhalation Suspension 0.5 milliGRAM(s) Inhalation every 12 hours  chlorhexidine 2% Cloths 1 Application(s) Topical <User Schedule>  diltiazem    Tablet 30 milliGRAM(s) Oral three times a day  guaiFENesin Oral Liquid (Sugar-Free) 300 milliGRAM(s) Oral every 6 hours  insulin lispro (ADMELOG) corrective regimen sliding scale   SubCutaneous every 6 hours  levETIRAcetam  Solution 750 milliGRAM(s) Oral two times a day  levothyroxine 75 MICROGram(s) Oral daily  melatonin 3 milliGRAM(s) Oral at bedtime  methylPREDNISolone sodium succinate Injectable 20 milliGRAM(s) IV Push every 6 hours  rivaroxaban 10 milliGRAM(s) Oral daily  senna 2 Tablet(s) Oral at bedtime    MEDICATIONS  (PRN):  acetaminophen     Tablet .. 650 milliGRAM(s) Oral every 6 hours PRN Temp greater or equal to 38C (100.4F), Mild Pain (1 - 3)  polyethylene glycol 3350 17 Gram(s) Oral daily PRN Constipation  traMADol 50 milliGRAM(s) Oral two times a day PRN Severe Pain (7 - 10)      Vital Signs Last 24 Hrs  T(C): 36.8 (21 Sep 2022 21:11), Max: 36.9 (21 Sep 2022 10:44)  T(F): 98.3 (21 Sep 2022 21:11), Max: 98.4 (21 Sep 2022 10:44)  HR: 88 (21 Sep 2022 22:30) (78 - 109)  BP: 96/67 (21 Sep 2022 21:11) (96/67 - 134/97)  BP(mean): --  RR: 20 (21 Sep 2022 21:11) (20 - 30)  SpO2: 94% (21 Sep 2022 22:30) (90% - 100%)    Parameters below as of 21 Sep 2022 21:11  Patient On (Oxygen Delivery Method): room air      CAPILLARY BLOOD GLUCOSE      POCT Blood Glucose.: 166 mg/dL (22 Sep 2022 05:57)  POCT Blood Glucose.: 196 mg/dL (21 Sep 2022 23:36)  POCT Blood Glucose.: 190 mg/dL (21 Sep 2022 21:20)  POCT Blood Glucose.: 139 mg/dL (21 Sep 2022 16:51)  POCT Blood Glucose.: 115 mg/dL (21 Sep 2022 12:14)    I&O's Summary    20 Sep 2022 07:01  -  21 Sep 2022 07:00  --------------------------------------------------------  IN: 250 mL / OUT: 800 mL / NET: -550 mL    21 Sep 2022 07:01  -  22 Sep 2022 06:30  --------------------------------------------------------  IN: 250 mL / OUT: 950 mL / NET: -700 mL        PHYSICAL EXAM:  HEAD:  Atraumatic, Normocephalic  NECK: Supple, No   JVD  CHEST/LUNG:   no     rales,     no,    rhonchi  HEART: Regular rate and rhythm;         murmur  ABDOMEN: Soft, Nontender, ;   EXTREMITIES:    no    edema      LABS:    09-21    x   |  x   |  x   ----------------------------<  x   x    |  x   |  0.50      TPro  6.8  /  Alb  3.6  /  TBili  0.5  /  DBili  0.2  /  AST  14  /  ALT  16  /  AlkPhos  234<H>  09-21    PT/INR - ( 21 Sep 2022 06:39 )   PT: 12.8 sec;   INR: 1.11 ratio                   ABG - ( 21 Sep 2022 14:19 )  pH, Arterial: 7.51  pH, Blood: x     /  pCO2: 33    /  pO2: 158   / HCO3: 26    / Base Excess: 3.6   /  SaO2: 100.0                         Consultant(s) Notes Reviewed:      Care Discussed with Consultants/Other Providers:

## 2022-09-22 NOTE — PROGRESS NOTE ADULT - SUBJECTIVE AND OBJECTIVE BOX
FRANKI MEHTA 87y [id number]    Patient is a 87y old  Female who presents with a chief complaint of SOB (22 Sep 2022 06:30)      Follow Up/CC:  ID following for COVID    Interval History/ROS: no fever, on NC     Allergies    Toradol (Urticaria (Mild to Mod); Rash (Mild to Mod))    Intolerances        ANTIMICROBIALS:      MEDICATIONS  (STANDING):  albuterol/ipratropium for Nebulization 3 milliLiter(s) Nebulizer <User Schedule>  allopurinol 100 milliGRAM(s) Oral daily  anastrozole 1 milliGRAM(s) Oral daily  atorvastatin 20 milliGRAM(s) Oral at bedtime  bisacodyl 5 milliGRAM(s) Oral at bedtime  buDESOnide    Inhalation Suspension 0.5 milliGRAM(s) Inhalation every 12 hours  chlorhexidine 2% Cloths 1 Application(s) Topical <User Schedule>  diltiazem    Tablet 30 milliGRAM(s) Oral three times a day  guaiFENesin Oral Liquid (Sugar-Free) 300 milliGRAM(s) Oral every 6 hours  insulin lispro (ADMELOG) corrective regimen sliding scale   SubCutaneous every 6 hours  levETIRAcetam  Solution 750 milliGRAM(s) Oral two times a day  levothyroxine 75 MICROGram(s) Oral daily  melatonin 3 milliGRAM(s) Oral at bedtime  methylPREDNISolone sodium succinate Injectable 20 milliGRAM(s) IV Push every 6 hours  rivaroxaban 10 milliGRAM(s) Oral daily  senna 2 Tablet(s) Oral at bedtime    MEDICATIONS  (PRN):  acetaminophen     Tablet .. 650 milliGRAM(s) Oral every 6 hours PRN Temp greater or equal to 38C (100.4F), Mild Pain (1 - 3)  polyethylene glycol 3350 17 Gram(s) Oral daily PRN Constipation  traMADol 50 milliGRAM(s) Oral two times a day PRN Severe Pain (7 - 10)        Vital Signs Last 24 Hrs  T(C): 37.2 (22 Sep 2022 11:23), Max: 37.2 (22 Sep 2022 11:23)  T(F): 99 (22 Sep 2022 11:23), Max: 99 (22 Sep 2022 11:23)  HR: 111 (22 Sep 2022 11:23) (78 - 111)  BP: 126/48 (22 Sep 2022 11:23) (96/67 - 126/48)  BP(mean): --  RR: 29 (22 Sep 2022 11:23) (20 - 29)  SpO2: 97% (22 Sep 2022 11:23) (90% - 97%)    Parameters below as of 22 Sep 2022 11:23  Patient On (Oxygen Delivery Method): nasal cannula          09-21    x   |  x   |  x   ----------------------------<  x   x    |  x   |  0.50      TPro  6.8  /  Alb  3.6  /  TBili  0.5  /  DBili  0.2  /  AST  14  /  ALT  16  /  AlkPhos  234<H>  09-21    LIVER FUNCTIONS - ( 21 Sep 2022 06:42 )  Alb: 3.6 g/dL / Pro: 6.8 g/dL / ALK PHOS: 234 U/L / ALT: 16 U/L / AST: 14 U/L / GGT: x               MICROBIOLOGY:  .Blood Blood-Peripheral  09-08-22   No Growth Final  --  --      .Blood Blood-Peripheral  09-08-22   No Growth Final  --  --      Clean Catch Clean Catch (Midstream)  08-27-22   >100,000 CFU/ml Enterococcus faecium  <10,000 CFU/ml Normal Urogenital ck present  --  Enterococcus faecium      .Blood Blood-Peripheral  08-27-22   No Growth Final  --  --      .Blood Blood-Peripheral  08-27-22   No Growth Final  --  --              v            RADIOLOGY

## 2022-09-23 NOTE — PROGRESS NOTE ADULT - SUBJECTIVE AND OBJECTIVE BOX
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; PEG in place    INTERVAL HISTORY: off AVAPS this AM - laying almost flat in bed with neck and back flexed; increased work of breathing; in airborne and contact isolation due to hospital acquired COVID infection; awake and alert; begging for lemonade; occasional cough productive of scant sputum; improving chest congestion or wheeze; no fevers, chills or sweats; no chest pain/pressure or palpitations; receiving PEG feeds when off NIV;      REVIEW OF SYSTEMS:  Constitutional: As per interval history  HEENT: Within normal limits  CV: As per interval history  Resp: As per interval history  GI: dysphagia  : Within normal limits  Musculoskeletal: Within normal limits  Skin: Within normal limits  Neurological: CVA - left sided plegia  Psychiatric: Within normal limits  Endocrine: Within normal limits  Hematologic/Lymphatic: Within normal limits  Allergic/Immunologic: Within normal limits    MEDICATIONS:     Pulmonary "  albuterol/ipratropium for Nebulization 3 milliLiter(s) Nebulizer <User Schedule>  buDESOnide    Inhalation Suspension 0.5 milliGRAM(s) Inhalation every 12 hours  guaiFENesin Oral Liquid (Sugar-Free) 300 milliGRAM(s) Oral every 6 hours    Anti-microbials:    Cardiovascular:  diltiazem    Tablet 30 milliGRAM(s) Oral three times a day    Other:  allopurinol 100 milliGRAM(s) Oral daily  anastrozole 1 milliGRAM(s) Oral daily  atorvastatin 20 milliGRAM(s) Oral at bedtime  bisacodyl 5 milliGRAM(s) Oral at bedtime  chlorhexidine 2% Cloths 1 Application(s) Topical <User Schedule>  insulin lispro (ADMELOG) corrective regimen sliding scale   SubCutaneous every 6 hours  levETIRAcetam  Solution 750 milliGRAM(s) Oral two times a day  levothyroxine 75 MICROGram(s) Oral daily  melatonin 3 milliGRAM(s) Oral at bedtime  methylPREDNISolone sodium succinate Injectable 20 milliGRAM(s) IV Push every 6 hours  rivaroxaban 10 milliGRAM(s) Oral daily  senna 2 Tablet(s) Oral at bedtime    MEDICATIONS  (PRN):  acetaminophen     Tablet .. 650 milliGRAM(s) Oral every 6 hours PRN Temp greater or equal to 38C (100.4F), Mild Pain (1 - 3)  polyethylene glycol 3350 17 Gram(s) Oral daily PRN Constipation      OBJECTIVE:    POCT Blood Glucose.: 208 mg/dL (23 Sep 2022 05:59)  POCT Blood Glucose.: 214 mg/dL (22 Sep 2022 23:26)  POCT Blood Glucose.: 255 mg/dL (22 Sep 2022 18:27)  POCT Blood Glucose.: 214 mg/dL (22 Sep 2022 11:15)      PHYSICAL EXAM:       ICU Vital Signs Last 24 Hrs  T(C): 37.2 (23 Sep 2022 06:09), Max: 37.2 (22 Sep 2022 11:23)  T(F): 98.9 (23 Sep 2022 06:09), Max: 99 (22 Sep 2022 11:23)  HR: 105 (23 Sep 2022 06:09) (102 - 111)  BP: 120/62 (23 Sep 2022 06:09) (109/77 - 126/48)  BP(mean): --  ABP: --  ABP(mean): --  RR: 18 (23 Sep 2022 06:09) (18 - 29)  SpO2: 98% (23 Sep 2022 06:09) (97% - 100%) on room air     General: Awake. Alert. Cooperative. Tachypneic. Appears stated age. Obese. Bedbound.   HEENT: Atraumatic. Normocephalic. Anicteric. Normal oral mucosa. PERRL. EOMI. Mallampati class IV airway. Poor dentition.  Neck: Supple. Trachea midline. Thyroid without enlargement/tenderness/nodules. No carotid bruit. No JVD. Short and wide. Neck flexed and chin resting on her anterior chest.  Cardiovascular: Regular rate and rhythm. S1 S2 normal. III/VI systolic murmur  Respiratory: Using accessory muscles of respiration. Decreased bilateral course breath sounds and wheeze. Kyphosis. Poor chest wall excursion  Abdomen: Soft. Non-tender. Non-distended. No organomegaly. No masses. Normal bowel sounds. Obese. PEG.  Extremities: Warm to touch. No clubbing or cyanosis. No pedal edema. Large legs. Left leg contracted under the right leg  Pulses: 2+ peripheral pulses all extremities.	  Skin: Normal skin color. No rashes or lesions. No ecchymoses. No cyanosis. Warm to touch.  Lymph Nodes: Cervical, supraclavicular and axillary nodes normal  Neurological: Left lower extremity plegia with contraction. Left upper extremity is quite weak. A and O x 3    LABS:      CBC    WBC  20.10 <==    Hemoglobin  9.7 <<==    Hematocrit  32.4 <==    Platelets  337 <==      x   |  x   |  x   ----------------------------<  x     09-21  x    |  x   |  0.50      LYTES    sodium  140 <==, 135 <==    potassium   5.0 <==, 4.7 <==    chloride  101 <==, 98 <==    carbon dioxide  26 <==, 24 <==    =============================================================================================  RENAL FUNCTION:    Creatinine:   0.50  <<==, 0.66  <<==, 0.57  <<==    BUN:   42 <==, 32 <==    ============================================================================================    calcium   9.3 <==, 9.1 <==    ============================================================================================  LFTs    AST:   14 <== , 19 <== , 11 <==     ALT:  16  <== , 17  <== , 12  <==     AP:  234  <=, 230  <=, 204  <=    Bili:  0.5  <=, 0.4  <=, 0.2  <=    PT/INR - ( 21 Sep 2022 06:39 )   PT: 12.8 sec;   INR: 1.11 ratio      ABG - ( 21 Sep 2022 14:19 )  pH: 7.51  /  pCO2: 33    /  pO2: 158   / HCO3: 26    / Base Excess: 3.6   /  SaO2: 100.0     Venous Blood Gas:  09-19 @ 09:30  7.34/53/50/29/80.0  VBG Lactate: 3.5    Venous Blood Gas:  09-18 @ 03:50  7.39/48/47/29/78.2  VBG Lactate: 1.7    Procalcitonin, Serum: 0.23 ng/mL (09-18 @ 09:59)    D-Dimer Assay, Quantitative (09.18.22 @ 09:59)   D-Dimer Assay, Quantitative: 196:    C-Reactive Protein, Serum (09.18.22 @ 09:59)   C-Reactive Protein, Serum: 5 mg/L     Ferritin, Serum in AM (09.18.22 @ 09:59)   Ferritin, Serum: 63 ng/mL     < from: TTE with Doppler (w/Cont) (01.21.22 @ 14:08) >    Patient name: FRANKI MEHTA  YOB: 1935   Age: 86 (F)   MR#: [id number]  Study Date: 1/21/2022  ------------------------------------------------------------------------  Dimensions:    Normal Values:  LA:     2.5    2.0 - 4.0 cm  Ao:     3.3    2.0 - 3.8 cm  SEPTUM: 1.6    0.6 - 1.2 cm  PWT:    0.9    0.6 - 1.1 cm  LVIDd:  3.6    3.0 - 5.6 cm  LVIDs:  2.5    1.8 - 4.0 cm  Derived variables:  LVMI: 77 g/m2  RWT: 0.50  Fractional short: 31 %  EF (Visual Estimate): 70-75 %  ------------------------------------------------------------------------  Conclusions:  1. Concentric left ventricular hypertrophy.  2. Hyperdynamic left ventricular systolic function.  Endocardial visualization enhanced with intravenous  injection of Ultrasonic Enhancing Agent (Definity).  3. The right ventricle is not well visualized; grossly  normal right ventricular systolic function.  Pt refused to proceed with exam.  Limited Doppler study.  No trans aortic gradients.  Unable to rule out endocarditis.  ------------------------------------------------------------------------  Confirmed on 1/21/2022 - 15:42:57 by COMPA Castillo  ------------------------------------------------------------------------  ---------------------------------------------------------------------------------------------------------------    MICROBIOLOGY:     COVID-19 PCR . (09.19.22 @ 18:31)   COVID-19 PCR: Detected:    Respiratory Viral Panel with COVID-19 by RYAN (09.08.22 @ 21:38)   Rapid RVP Result: [facility]   SARS-CoV-2: [facility]  This Respiratory Panel uses polymerase chain reaction (PCR) to detect for   adenovirus; coronavirus (HKU1, NL63, 229E, OC43); human metapneumovirus   (hMPV); human enterovirus/rhinovirus (Entero/RV); influenza A; influenza   A/H1; influenza A/H3; influenza A/H1-2009; influenza B; parainfluenza   viruses 1, 2, 3, 4; respiratory syncytial virus; Mycoplasma pneumoniae;   Chlamydophila pneumoniae; and SARS-CoV-2.     Culture - Blood (09.08.22 @ 20:39)   Specimen Source: .Blood Blood-Peripheral   Culture Results:   No growth to date.     Culture - Blood (09.08.22 @ 20:25)   Specimen Source: .Blood Blood-Peripheral   Culture Results:   No growth to date.     RADIOLOGY:  [x ] Chest radiographs reviewed and interpreted by me    EXAM:  XR CHEST PORTABLE URGENT 1V                          PROCEDURE DATE:  09/19/2022      FINDINGS:  Radiographic interpretation is limited by patient positioning and   overlying artifact.  Gastrostomy tube is noted.  Left humeral fixation plate and screws are noted.  The heart size cannot be accurately assessed in this projection.  Left basilar hazy opacities  Low lung volumes bilaterally.  There is no pneumothorax or large pleural effusion.  No acute bony abnormality.    IMPRESSION:  Left basilar hazy opacity may represent atelectasis.    AMY JARAMILLO MD; Resident Radiologist  This document has been electronically signed.  DIANE CLARK MD; Attending Radiologist  This document has been electronically signed. Sep 19 2022  6:08PM  ---------------------------------------------------------------------------------------------------------------    EXAM:  XR CHEST PORTABLE URGENT 1V                          PROCEDURE DATE:  09/12/2022      FINDINGS:    Gastrostomy tube.  The heart is enlarged.  Patchy left mid and lower lung opacity is minimally increased. The right   lung is clear.  No pleural effusion or pneumothorax.    IMPRESSION:  Minimally increased left mid to lower lung atelectasis..    ROBSON PIZANO MD; Resident Radiology  This document has been electronically signed.  DIANE CLARK MD; Attending Radiologist  This document has been electronically signed. Sep 12 2022  2:46PM  --------------------------------------------------------------------------------------------------------------  EXAM:  CT CHEST                          PROCEDURE DATE:  09/14/2022      FINDINGS:    LUNGS AND AIRWAYS: Patent central airways.  Subsegmental atelectasis is   seen in the bilateral upper and lower lobes.  PLEURA: No pleural effusion.  MEDIASTINUM AND GIOVANA: No lymphadenopathy.  VESSELS: Calcifications of the aorta, aortic valve and coronary arteries.  HEART: Heart size is normal. No pericardial effusion.  CHEST WALL AND LOWER NECK: Within normal limits.  VISUALIZED UPPER ABDOMEN: Cholelithiasis.  BONES: Multiple lytic bone lesions are again seen including a destructive   lesion at the level of the T5 vertebral body that may extend into the   left neural foramen (3:42), unchanged from prior CT chest.    IMPRESSION:  Subsegmental atelectasis in the bilateral upper and lower lobes.    Lytic bone lesions as described above, unchanged from prior CT chest   8/29/2022. Recommend MR thoracic spine for further evaluation.     ALEXANDRIA COLLINS MD; Resident Radiologist  This document has been electronically signed.  HAY IRVIN MD; Attending Radiologist  This document has been electronically signed. Sep 14 2022  3:23PM  ---------------------------------------------------------------------------------------------------------------  EXAM:  DUPLEX SCAN EXT VEINS LOWER BI                          PROCEDURE DATE:  09/10/2022      IMPRESSION:  Limited study  No evidence of deep venous thrombosis in either lower extremity.  Limited visualization of the calf veins.    KEYLA ROMERO MD; Attending Radiologist  This document has been electronically signed. Sep 10 2022 10:42AM  ---------------------------------------------------------------------------------------------------------------  EXAM:  CT ABDOMEN AND PELVIS IC                          PROCEDURE DATE:  08/31/2022      IMPRESSION:    Multiple lytic lesions are seen throughout the axial and appendicular   skeleton, some of which appear increased in size from CT abdomen pelvis   3/16/2022 when evaluated in retrospect. A few lytic lesions involve the   left intertrochanteric region and right acetabulum.    Endometrial thickening. Recommend dedicated pelvic sonogram.    Cystic lesions are seen within the pancreas. Recommend a dedicated MRI on   a nonemergent basis.    SULY KENDRICK MD; Resident Radiology  This document has been electronically signed.  BRITTANI MALCOLM MD; Attending Radiologist  This document has been electronically signed. Sep  1 2022 11:01AM  ---------------------------------------------------------------------------------------------------------------  EXAM:  XR HUMERUS MIN 2 VIEWS RT                          PROCEDURE DATE:  08/30/2022      EXAM:  Internal/external rotation AP right humerus from 8/30/2022 at 0927.   Compared to appearance on previous day chest CT.    IMPRESSION:  Generalized osteopenia. Redemonstrated focal lytic lesion in proximal   medial humeral diaphysis with small area of permeative cortical   destruction. No additional radiographically appreciated suspicious lytic   or blastic lesions in remaining imaged regions.    No current fractures or dislocations.    Preserved shoulder and elbow joint spaces.    IV cannula with attached tubing overlies upper arm.    MINE NICE MD; Attending Radiologist  This document has been electronically signed. Aug 30 2022 10:39AM  ---------------------------------------------------------------------------------------------------------------  EXAM:  DUPLEX EXT VEINS UPPER LT                          PROCEDURE DATE:  09/01/2022      IMPRESSION:  No evidence of left upper extremity deep venous thrombosis.    FAWN FITZGERALD MD; Attending Radiologist  This document has been electronically signed. Sep  1 2022  1:10PM  ---------------------------------------------------------------------------------------------------------------  EXAM:  DUPLEX SCAN EXT VEINS LOWER BI                          PROCEDURE DATE:  08/31/2022      IMPRESSION:  No evidence of deep venous thrombosis in either lower extremity.    KEYLA ROMERO MD; Attending Radiologist  This document has been electronically signed. Aug 31 2022  7:59PM  ---------------------------------------------------------------------------

## 2022-09-23 NOTE — PROGRESS NOTE ADULT - ASSESSMENT
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 She also has a history of HTN, HLD, CAD s/p MI with preserved LVEF and moderate aortic stenosis. The patient was admitted to [facility] in December 2021 with fever and abdominal pain due to perforated appendicitis. She was treated with tube drainage and antibiotics for a polymicrobial infection. Hospital course was complicated by respiratory failure due to mucous plugging with complete collapse of the left lung. She underwent several bronchoscopies for pulmonary toilet and was found to have severe tracheobronchomalacia. Her respiratory status has remained "stable" while at [facility] on nebulized bronchodilators and hypertonic saline and without nocturnal NIV. She was admitted in March with hematochezia. The left lower lobe was well aerated on a CT scan of the abdomen and pelvis with only bibasilar subsegmental atelectasis - there was scattered sigmoid diverticulosis without evidence of diverticulitis - interval decrease in size of a fluid collection in the region of the previously perforated appendicitis measuring 2.8 x 1.5 cm previously measuring 4.0 x 2.2 cm - slight increase in mild adjacent inflammatory change. The GI bleed was treated conservatively.  She was admitted on 8/29 with shortness of breath in the setting of back, neck and left lower extremity pain. She required NIV for mild acute hypercapnic respiratory failure that quickly resolved. Her respiratory status remained stable on room air and on her usual nebs and mucolytics. She was started on a puree diet with moderately thickened fluids in addition to PEG feeds following evaluation by the speech and swallow team. She was started on anastrazole for likely metastatic breast cancer to the bone. On the day of discharge, the patient was quite anxious with shortness of breath, chest congestion and wheeze - she was making a grunting noise with expiration. She was returned from [facility] that evening. Currently she is tachypneic and using accessory muscles of respiration. She has an audible wheeze and cough productive of scant sputum. No fevers, chills or sweats. No chest pain/pressure or palpitations.     the patient has new onset bronchospasm with increased work of breathing and worsening left lower lobe atelectasis -  she has severe tracheobronchomalacia that has resulted in mucous plugging with complete left lung collapse requiring several bronchoscopies for pulmonary toilet in the past - the patient was felt not to be a candidate for surgery for the tracheobronchomalacia and stenting was felt to have more risk than benefit     metastatic breast cancer    9/19 -  found to be COVID positive on discharge [facility]; awake and alert; now with marked worsening of shortness of breath and using her accessory muscles of respiration; severe chest congestion and wheeze exacerbated by neck flexion; increasing pCO2, WBC and lactate; occasional cough productive of scant sputum; no fevers, chills or sweats; no chest pain/pressure or palpitations; back on PEG feeds; switched from nebs to inhalers yesterday as Dr. Dexter was informed by nursing that COVID positive patients cannot receive nebs at [facility]    9/21 - spent the day on AVAPS -> removed this AM with somewhat less increased work of breathing and decreased chest congestion and wheeze; VBG not sent this AM; in airborne and contact isolation due to hospital acquired COVID infection; awake and alert; occasional cough productive of scant sputum; no fevers, chills or sweats; no chest pain/pressure or palpitations;     9/23 - off AVAPS this AM - laying almost flat in bed with neck and back flexed; increased work of breathing; in airborne and contact isolation due to hospital acquired COVID infection; awake and alert; begging for lemonade; occasional cough productive of scant sputum; improving chest congestion or wheeze; no fevers, chills or sweats; no chest pain/pressure or palpitations; receiving PEG feeds when off NIV;    PLAN/RECOMMENDATIONS:    trial off AVAPS ->   ABG 9/21 -> 7.51/33/158/100%% - respiratory alkalosis on AVAPS  repeat CXR 9/19 -> left basilar hazy opacities c/w atelectasis - low lung volumes bilaterally  has completed a 5 day course of remdesivir - following renal and liver function which remain normal  continue solumedrol 20mg IV q6h  continue albuterol/atrovent nebs q4h  continue pulmicort 0.5mg nebs q12h  guaifenesin 300mg 4 times daily via PEG  incentive spirometry   acapella device   follow inflammatory markers - CRP - ferritin - d-dimer -> all within normal limits  CT scan 8/31 -> multiple lytic lesions are seen throughout the axial and appendicular skeleton, some of which appear increased in size from CT abdomen pelvis 3/16/2022 when evaluated in retrospect - a few lytic lesions involve the left intertrochanteric region and right acetabulum  oncology follow-up noted    s/p right simple mastectomy 4/2019 for breast cancer but did not receive adjuvant hormonal therapy    it is likely that the patient has metastatic breast cancer    started on anastrozole as the patient could not tolerate a MRI and is not a candidate for bone biopsy  treatment of HTN, HLD, CAD, aortic stenosis per cardiology  DVT prophylaxis - xarelto  glucose control  bowel regimen  analgesics  keppra    Will follow with you. Plan of care discussed with the patient and her RN at bedside and the dedicated floor NP. Prognosis is poor.    Dr. Suman Bhatia will be covering our service. Please call [phone] with questions or clinical changes. Thank you.     Kennedy Marcano MD, [facility]  [phone]  Pulmonary Medicine   ASSESSMENT:     87 year old gentlewoman, lifelong non-smoker, well known to me without history of intrinsic lung disease. 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 She also has a history of HTN, HLD, CAD s/p MI with preserved LVEF and moderate aortic stenosis. The patient was admitted to [facility] in December 2021 with fever and abdominal pain due to perforated appendicitis. She was treated with tube drainage and antibiotics for a polymicrobial infection. Hospital course was complicated by respiratory failure due to mucous plugging with complete collapse of the left lung. She underwent several bronchoscopies for pulmonary toilet and was found to have severe tracheobronchomalacia. Her respiratory status has remained "stable" while at [facility] on nebulized bronchodilators and hypertonic saline and without nocturnal NIV. She was admitted in March with hematochezia. The left lower lobe was well aerated on a CT scan of the abdomen and pelvis with only bibasilar subsegmental atelectasis - there was scattered sigmoid diverticulosis without evidence of diverticulitis - interval decrease in size of a fluid collection in the region of the previously perforated appendicitis measuring 2.8 x 1.5 cm previously measuring 4.0 x 2.2 cm - slight increase in mild adjacent inflammatory change. The GI bleed was treated conservatively.  She was admitted on 8/29 with shortness of breath in the setting of back, neck and left lower extremity pain. She required NIV for mild acute hypercapnic respiratory failure that quickly resolved. Her respiratory status remained stable on room air and on her usual nebs and mucolytics. She was started on a puree diet with moderately thickened fluids in addition to PEG feeds following evaluation by the speech and swallow team. She was started on anastrazole for likely metastatic breast cancer to the bone. On the day of discharge, the patient was quite anxious with shortness of breath, chest congestion and wheeze - she was making a grunting noise with expiration. She was returned from [facility] that evening. Currently she is tachypneic and using accessory muscles of respiration. She has an audible wheeze and cough productive of scant sputum. No fevers, chills or sweats. No chest pain/pressure or palpitations.     the patient has new onset bronchospasm with increased work of breathing and worsening left lower lobe atelectasis -  she has severe tracheobronchomalacia that has resulted in mucous plugging with complete left lung collapse requiring several bronchoscopies for pulmonary toilet in the past - the patient was felt not to be a candidate for surgery for the tracheobronchomalacia and stenting was felt to have more risk than benefit     metastatic breast cancer    9/19 -  found to be COVID positive on discharge [facility]; awake and alert; now with marked worsening of shortness of breath and using her accessory muscles of respiration; severe chest congestion and wheeze exacerbated by neck flexion; increasing pCO2, WBC and lactate; occasional cough productive of scant sputum; no fevers, chills or sweats; no chest pain/pressure or palpitations; back on PEG feeds; switched from nebs to inhalers yesterday as Dr. Dexter was informed by nursing that COVID positive patients cannot receive nebs at [facility]    9/21 - spent the day on AVAPS -> removed this AM with somewhat less increased work of breathing and decreased chest congestion and wheeze; VBG not sent this AM; in airborne and contact isolation due to hospital acquired COVID infection; awake and alert; occasional cough productive of scant sputum; no fevers, chills or sweats; no chest pain/pressure or palpitations;     9/23 - off AVAPS this AM - laying almost flat in bed with neck and back flexed; increased work of breathing; in airborne and contact isolation due to hospital acquired COVID infection; awake and alert; begging for lemonade; occasional cough productive of scant sputum; improving chest congestion or wheeze; no fevers, chills or sweats; no chest pain/pressure or palpitations; receiving PEG feeds when off NIV;    PLAN/RECOMMENDATIONS:    trial off AVAPS ->   ABG 9/21 -> 7.51/33/158/100%% - respiratory alkalosis on AVAPS  repeat CXR 9/19 -> left basilar hazy opacities c/w atelectasis - low lung volumes bilaterally  has completed a 5 day course of remdesivir - following renal and liver function which remain normal  continue solumedrol 20mg IV q6h  continue albuterol/atrovent nebs q4h  continue pulmicort 0.5mg nebs q12h  guaifenesin 300mg 4 times daily via PEG  incentive spirometry   acapella device   follow inflammatory markers - CRP - ferritin - d-dimer -> all within normal limits  CT scan 8/31 -> multiple lytic lesions are seen throughout the axial and appendicular skeleton, some of which appear increased in size from CT abdomen pelvis 3/16/2022 when evaluated in retrospect - a few lytic lesions involve the left intertrochanteric region and right acetabulum  oncology follow-up noted    s/p right simple mastectomy 4/2019 for breast cancer but did not receive adjuvant hormonal therapy    it is likely that the patient has metastatic breast cancer    started on anastrozole as the patient could not tolerate a MRI and is not a candidate for bone biopsy  treatment of HTN, HLD, CAD, aortic stenosis per cardiology  DVT prophylaxis - xarelto  glucose control  bowel regimen  analgesics  keppra    Will follow with you. Plan of care discussed with the patient and her RN at bedside and the dedicated floor NP. The patient's respiratory status is far from baseline and quite tenuous. She is difficulty to manage simply on a telemetry floor. Please call the ICU for more aggressive management and observation    Dr. Suman Bhatia will be covering our service over the weekend. Please call [phone] with questions or clinical changes. Thank you.     Kennedy Marcano MD, [facility]  [phone]  Pulmonary Medicine

## 2022-09-23 NOTE — CONSULT NOTE ADULT - SUBJECTIVE AND OBJECTIVE BOX
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 Very limited hx obtained from pt given mental status, dyspnea, and use of BIPAP. Unable to reach sonSy. History obtained from staff/chart review.    Per ED staff who saw pt when she first arrived, pt was notably dyspneic with increased WOB and wheezing. Supposedly missed use of nightly BIPAP at rehab. Reportedly denied fever and cough. On encounter for admission, pt with BIPAP mask on, appearing slightly lethargic and mildly dyspneic, but was able to answer some questions. Pt endorsed having SOB. Denied pain. Seemed to believe initially that she was at [facility]? but was difficult to understand her responses.    Of note, pt has multiple recent admissions with similar presentation. Most recently was hospitalized from 8/28/22-9/8/22 for SOB, treated with airway clearance and completed 5-day course of Zosyn for empiric coverage of PNA (though per Pulm, unlikely to have PNA). Course c/b PEG displacement and subsequent reinsertion by IR on 8/30. In addition to her usual PEG feeds, pt was also started on puree diet with moderately thickened fluids for pleasure feeds. Also was started on anastrozole for most likely dx of metastatic breast cancer to bones (pt was unable to tolerate further cancer workup of spine lesions). Pt remained full code during recent admissions.    In ED: -110s, RR 20s, sating 99% on BIPAP FiO2 40%. WBC 11k. CXR with patchy atelectasis in LLL and low lung volume, otherwise no focal consolidations. Received solumedrol 125mg IVP x1, 2.25% racepinephrine 0.5mL x1, duoneb x2, vanc/cefepime x1 dose. Admitted to Medicine for further management.   (08 Sep 2022 22:55)  Patient has history of diabetes, A1C? , has hyperglycemia, Endo was consulted for glycemic control.    PAST MEDICAL & SURGICAL HISTORY:  MI (myocardial infarction)      Personal history of asbestosis      Gout      UTI (lower urinary tract infection)      ETOH abuse      CVA (cerebral vascular accident)      Tracheobronchomalacia      HTN (hypertension)      HLD (hyperlipidemia)      History of left shoulder fracture      History of benign breast tumor          FAMILY HISTORY:  No pertinent family history in first degree relatives        Social History:    Outpatient Medications:    MEDICATIONS  (STANDING):  albuterol/ipratropium for Nebulization 3 milliLiter(s) Nebulizer <User Schedule>  allopurinol 100 milliGRAM(s) Oral daily  anastrozole 1 milliGRAM(s) Oral daily  atorvastatin 20 milliGRAM(s) Oral at bedtime  bisacodyl 5 milliGRAM(s) Oral at bedtime  buDESOnide    Inhalation Suspension 0.5 milliGRAM(s) Inhalation every 12 hours  chlorhexidine 2% Cloths 1 Application(s) Topical <User Schedule>  dextrose 50% Injectable 25 Gram(s) IV Push once  dextrose 50% Injectable 12.5 Gram(s) IV Push once  dextrose 50% Injectable 25 Gram(s) IV Push once  dextrose Oral Gel 15 Gram(s) Oral once  diltiazem    Tablet 30 milliGRAM(s) Oral three times a day  glucagon  Injectable 1 milliGRAM(s) IntraMuscular once  guaiFENesin Oral Liquid (Sugar-Free) 300 milliGRAM(s) Oral every 6 hours  insulin lispro (ADMELOG) corrective regimen sliding scale   SubCutaneous every 6 hours  insulin NPH human recombinant 6 Unit(s) SubCutaneous every 6 hours  insulin NPH human recombinant 6 Unit(s) SubCutaneous once  levETIRAcetam  Solution 750 milliGRAM(s) Oral two times a day  levothyroxine 75 MICROGram(s) Oral daily  melatonin 3 milliGRAM(s) Oral at bedtime  methylPREDNISolone sodium succinate Injectable 20 milliGRAM(s) IV Push every 6 hours  rivaroxaban 10 milliGRAM(s) Oral daily  senna 2 Tablet(s) Oral at bedtime    MEDICATIONS  (PRN):  acetaminophen     Tablet .. 650 milliGRAM(s) Oral every 6 hours PRN Temp greater or equal to 38C (100.4F), Mild Pain (1 - 3)  polyethylene glycol 3350 17 Gram(s) Oral daily PRN Constipation      Allergies    Toradol (Urticaria (Mild to Mod); Rash (Mild to Mod))    Intolerances      Review of Systems:  Constitutional: No fever, no chills  Eyes: No blurry vision  Neuro: No tremors  HEENT: No pain, no neck swelling  Cardiovascular: No chest pain, no palpitations  Respiratory: Has SOB, no cough  GI: No nausea, vomiting, abdominal pain  : No dysuria  Skin: no rash  MSK: Has leg swelling.  Psych: no depression  Endocrine: no polyuria, polydipsia    ALL OTHER SYSTEMS REVIEWED AND NEGATIVE    UNABLE TO OBTAIN    PHYSICAL EXAM:  VITALS: T(C): 37.1 (09-23-22 @ 11:32)  T(F): 98.7 (09-23-22 @ 11:32), Max: 99 (09-22-22 @ 20:30)  HR: 100 (09-23-22 @ 11:32) (100 - 109)  BP: 92/57 (09-23-22 @ 11:32) (92/57 - 120/62)  RR:  (18 - 27)  SpO2:  (98% - 100%)  Wt(kg): --  GENERAL: NAD, well-groomed, well-developed  EYES: No proptosis, no lid lag  HEENT:  Atraumatic, Normocephalic  THYROID: Normal size, no palpable nodules  RESPIRATORY: Clear to auscultation bilaterally; No rales, rhonchi, wheezing  CARDIOVASCULAR: Si S2, No murmurs;  GI: Soft, non distended, normal bowel sounds  SKIN: Dry, intact, No rashes or lesions  MUSCULOSKELETAL: Has BL lower extremity edema.  NEURO:  no tremor, sensation decreased in feet BL,    POCT Blood Glucose.: 219 mg/dL (09-23-22 @ 11:27)  POCT Blood Glucose.: 208 mg/dL (09-23-22 @ 05:59)  POCT Blood Glucose.: 214 mg/dL (09-22-22 @ 23:26)  POCT Blood Glucose.: 255 mg/dL (09-22-22 @ 18:27)  POCT Blood Glucose.: 214 mg/dL (09-22-22 @ 11:15)  POCT Blood Glucose.: 138 mg/dL (09-22-22 @ 07:30)  POCT Blood Glucose.: 166 mg/dL (09-22-22 @ 05:57)  POCT Blood Glucose.: 196 mg/dL (09-21-22 @ 23:36)  POCT Blood Glucose.: 190 mg/dL (09-21-22 @ 21:20)  POCT Blood Glucose.: 139 mg/dL (09-21-22 @ 16:51)  POCT Blood Glucose.: 115 mg/dL (09-21-22 @ 12:14)  POCT Blood Glucose.: 164 mg/dL (09-21-22 @ 06:06)  POCT Blood Glucose.: 172 mg/dL (09-21-22 @ 00:09)  POCT Blood Glucose.: 201 mg/dL (09-20-22 @ 21:35)  POCT Blood Glucose.: 186 mg/dL (09-20-22 @ 16:40)          09-23    x   |  x   |  x   ----------------------------<  x   x    |  x   |  0.56    eGFR: 89      TPro  6.5  /  Alb  3.5  /  TBili  0.4  /  DBili  0.1  /  AST  15  /  ALT  17  /  AlkPhos  218<H>  09-23      Thyroid Function Tests:              Radiology:                HPI:  86F w/ extensive hx including severe tracheobronchomalacia (c/b hypoxia 2/2 mucous plugging and recurrent L lung collapse), 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 Very limited hx obtained from pt given mental status, dyspnea, and use of BIPAP. Unable to reach sonSy. History obtained from staff/chart review.    Per ED staff who saw pt when she first arrived, pt was notably dyspneic with increased WOB and wheezing. Supposedly missed use of nightly BIPAP at rehab. Reportedly denied fever and cough. On encounter for admission, pt with BIPAP mask on, appearing slightly lethargic and mildly dyspneic, but was able to answer some questions. Pt endorsed having SOB. Denied pain. Seemed to believe initially that she was at [facility]? but was difficult to understand her responses.    Of note, pt has multiple recent admissions with similar presentation. Most recently was hospitalized from 8/28/22-9/8/22 for SOB, treated with airway clearance and completed 5-day course of Zosyn for empiric coverage of PNA (though per Pulm, unlikely to have PNA). Course c/b PEG displacement and subsequent reinsertion by IR on 8/30. In addition to her usual PEG feeds, pt was also started on puree diet with moderately thickened fluids for pleasure feeds. Also was started on anastrozole for most likely dx of metastatic breast cancer to bones (pt was unable to tolerate further cancer workup of spine lesions). Pt remained full code during recent admissions.    In ED: -110s, RR 20s, sating 99% on BIPAP FiO2 40%. WBC 11k. CXR with patchy atelectasis in LLL and low lung volume, otherwise no focal consolidations. Received solumedrol 125mg IVP x1, 2.25% racepinephrine 0.5mL x1, duoneb x2, vanc/cefepime x1 dose. Admitted to Medicine for further management.   (08 Sep 2022 22:55)  Patient has history of diabetes, A1C? , has hyperglycemia, Endo was consulted for glycemic control.    PAST MEDICAL & SURGICAL HISTORY:  MI (myocardial infarction)      Personal history of asbestosis      Gout      UTI (lower urinary tract infection)      ETOH abuse      CVA (cerebral vascular accident)      Tracheobronchomalacia      HTN (hypertension)      HLD (hyperlipidemia)      History of left shoulder fracture      History of benign breast tumor          FAMILY HISTORY:  No pertinent family history in first degree relatives        Social History:    Outpatient Medications:    MEDICATIONS  (STANDING):  albuterol/ipratropium for Nebulization 3 milliLiter(s) Nebulizer <User Schedule>  allopurinol 100 milliGRAM(s) Oral daily  anastrozole 1 milliGRAM(s) Oral daily  atorvastatin 20 milliGRAM(s) Oral at bedtime  bisacodyl 5 milliGRAM(s) Oral at bedtime  buDESOnide    Inhalation Suspension 0.5 milliGRAM(s) Inhalation every 12 hours  chlorhexidine 2% Cloths 1 Application(s) Topical <User Schedule>  dextrose 50% Injectable 25 Gram(s) IV Push once  dextrose 50% Injectable 12.5 Gram(s) IV Push once  dextrose 50% Injectable 25 Gram(s) IV Push once  dextrose Oral Gel 15 Gram(s) Oral once  diltiazem    Tablet 30 milliGRAM(s) Oral three times a day  glucagon  Injectable 1 milliGRAM(s) IntraMuscular once  guaiFENesin Oral Liquid (Sugar-Free) 300 milliGRAM(s) Oral every 6 hours  insulin lispro (ADMELOG) corrective regimen sliding scale   SubCutaneous every 6 hours  insulin NPH human recombinant 6 Unit(s) SubCutaneous every 6 hours  insulin NPH human recombinant 6 Unit(s) SubCutaneous once  levETIRAcetam  Solution 750 milliGRAM(s) Oral two times a day  levothyroxine 75 MICROGram(s) Oral daily  melatonin 3 milliGRAM(s) Oral at bedtime  methylPREDNISolone sodium succinate Injectable 20 milliGRAM(s) IV Push every 6 hours  rivaroxaban 10 milliGRAM(s) Oral daily  senna 2 Tablet(s) Oral at bedtime    MEDICATIONS  (PRN):  acetaminophen     Tablet .. 650 milliGRAM(s) Oral every 6 hours PRN Temp greater or equal to 38C (100.4F), Mild Pain (1 - 3)  polyethylene glycol 3350 17 Gram(s) Oral daily PRN Constipation      Allergies    Toradol (Urticaria (Mild to Mod); Rash (Mild to Mod))    Intolerances      Review of Systems:  Constitutional: No fever, no chills  Eyes: No blurry vision  Neuro: No tremors  HEENT: No pain, no neck swelling  Cardiovascular: No chest pain, no palpitations  Respiratory: Has SOB, no cough  GI: No nausea, vomiting, abdominal pain  : No dysuria  Skin: no rash  MSK: Has leg swelling.  Psych: no depression  Endocrine: no polyuria, polydipsia    ALL OTHER SYSTEMS REVIEWED AND NEGATIVE    UNABLE TO OBTAIN    PHYSICAL EXAM:  VITALS: T(C): 37.1 (09-23-22 @ 11:32)  T(F): 98.7 (09-23-22 @ 11:32), Max: 99 (09-22-22 @ 20:30)  HR: 100 (09-23-22 @ 11:32) (100 - 109)  BP: 92/57 (09-23-22 @ 11:32) (92/57 - 120/62)  RR:  (18 - 27)  SpO2:  (98% - 100%)  Wt(kg): --  GENERAL: NAD, well-groomed, well-developed  EYES: No proptosis, no lid lag  HEENT:  Atraumatic, Normocephalic  THYROID: Normal size, no palpable nodules  RESPIRATORY: Clear to auscultation bilaterally; No rales, rhonchi, wheezing  CARDIOVASCULAR: Si S2, No murmurs;  GI: Soft, non distended, normal bowel sounds  SKIN: Dry, intact, No rashes or lesions  MUSCULOSKELETAL: Has BL lower extremity edema.  NEURO:  no tremor, sensation decreased in feet BL,    POCT Blood Glucose.: 219 mg/dL (09-23-22 @ 11:27)  POCT Blood Glucose.: 208 mg/dL (09-23-22 @ 05:59)  POCT Blood Glucose.: 214 mg/dL (09-22-22 @ 23:26)  POCT Blood Glucose.: 255 mg/dL (09-22-22 @ 18:27)  POCT Blood Glucose.: 214 mg/dL (09-22-22 @ 11:15)  POCT Blood Glucose.: 138 mg/dL (09-22-22 @ 07:30)  POCT Blood Glucose.: 166 mg/dL (09-22-22 @ 05:57)  POCT Blood Glucose.: 196 mg/dL (09-21-22 @ 23:36)  POCT Blood Glucose.: 190 mg/dL (09-21-22 @ 21:20)  POCT Blood Glucose.: 139 mg/dL (09-21-22 @ 16:51)  POCT Blood Glucose.: 115 mg/dL (09-21-22 @ 12:14)  POCT Blood Glucose.: 164 mg/dL (09-21-22 @ 06:06)  POCT Blood Glucose.: 172 mg/dL (09-21-22 @ 00:09)  POCT Blood Glucose.: 201 mg/dL (09-20-22 @ 21:35)  POCT Blood Glucose.: 186 mg/dL (09-20-22 @ 16:40)          09-23    x   |  x   |  x   ----------------------------<  x   x    |  x   |  0.56    eGFR: 89      TPro  6.5  /  Alb  3.5  /  TBili  0.4  /  DBili  0.1  /  AST  15  /  ALT  17  /  AlkPhos  218<H>  09-23      Thyroid Function Tests:              Radiology:

## 2022-09-23 NOTE — PROGRESS NOTE ADULT - ASSESSMENT
89    year old female    h/o hemorrhagic CVA, has  PEG,  HTN, MI,   HLD, gout, right ca  breast   right Ca breast. s/p mastectomy in 2019,  s/p  sentinel node  localization.  4/4,  were  negative  peg dislodged.  IR   replacements  on 1/3/22  s/p rrt  . in past,  for hypoxia. cxr with complete  collapsed  of  left lung, needing broch,   s/ p  bronchoscopy , pt  with  tracheomalacia  and  collapsed  airways,  makes  this a  difficult  situation, as there is no good rx for this     h/o bacteremia. on   pna, perforated  appendicitis,  ?  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 this will not alter rx. pt  redvd  extended  course  of ab   on iv  vanco and ertapenem.  till  2/9/.22.        *  admitted with   presumed  resp  failure/ pt was  sent back from [facility], the same day   pt seen in er.  appears   at her naseline.  no  wheezing /  atousable    ENT eval w/ laryngoscopy notable for vocal cords mobile and intact, no edema, upper airway patent     hypertonic saline for airway clearance   cxr, no pna     *   s/p cva, has  PEG,  npo  ,   on  synthroid, Keppra  ,  *   HTN, on  cardizem,  per  card dr jamison  *  h/o  Ca breast. /, s/p  R  mastectomy  *   obesity, bmi  was   41, now  is  30   *     c/c left  leg contracture ,  remains  bed  bound. functional  paraplegia,  needs  assistance  for  all her  adl's   *  pt  with  h/o  tracheomalacia  and  collapsed  airways,      given pt;s  mlple co morbidities,  propensity  for  lung collpse,   pt is  at risk for  re admissions     on nocturnal  bipap    difficult  situation, a s there  is no  good  rx  for  pt's  recurrent lung collapse hypoxia     h/o  of  chronic    mild  tachycardia          *   CT chest. lytic  lesions, T  spine/  ribs/ humerus/  oncoloigy  magnus pacheco.  suspect  metastatic ca breast        son. guicho Dan/ oncologist  has  also  discussed  with  son,  futility of  pursuing  bx. a s pt  is  not an ideal   candidate  for  chemo    CT  A.p ,/ prelim ,  pelvic  mets  per  oncology,   suspect  metastatic ca  breast,  was  awaiting   mri  spine/  pt unable  to tolerate  mri   per oncology, no  further  intervention/  and on  Arimidex, now,   per d r ohri   obesity,  bed bound.  functional paraplegia    per  son,   rehab promptly  returned   to er/ as they  did not  have  a  bipap machine /  care cortn to f/.p, needs  24/7  O2/ nocturnal bipap   difficult  situation,  bed  bound. obesity. c/c  hypercarbia, needing , prn /  nocturnal bipap/  with intermittent tachypnea    now  is  covid +. on iv steroid/ wheezing/ remdissivir/, seen by  house  ID     dvt ppx/  xarelto  for   30  days/  remains on iv steroid  pt  with  frequent episodes  of tachypnea. has  obstructive /restrictive   lung  disease ,  obesity/ RAQUEL/ hypercarbia   remains on iv  steroid,  difficult  situation   called son, mlple  times,  message  left/  can  only   start   d/c  planning,  when cleraed  by  pulm   remains  on iv  steroid.  fs noted      per  son, pt is  full code

## 2022-09-23 NOTE — CONSULT NOTE ADULT - SUBJECTIVE AND OBJECTIVE BOX
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 Very limited hx obtained from pt given mental status, dyspnea, and use of BIPAP. Unable to reach sonSy. History obtained from staff/chart review.    Per ED staff who saw pt when she first arrived, pt was notably dyspneic with increased WOB and wheezing. Supposedly missed use of nightly BIPAP at rehab. Reportedly denied fever and cough. On encounter for admission, pt with BIPAP mask on, appearing slightly lethargic and mildly dyspneic, but was able to answer some questions. Pt endorsed having SOB. Denied pain. Seemed to believe initially that she was at [facility]? but was difficult to understand her responses.    Of note, pt has multiple recent admissions with similar presentation. Most recently was hospitalized from 8/28/22-9/8/22 for SOB, treated with airway clearance and completed 5-day course of Zosyn for empiric coverage of PNA (though per Pulm, unlikely to have PNA). Course c/b PEG displacement and subsequent reinsertion by IR on 8/30. In addition to her usual PEG feeds, pt was also started on puree diet with moderately thickened fluids for pleasure feeds. Also was started on anastrozole for most likely dx of metastatic breast cancer to bones (pt was unable to tolerate further cancer workup of spine lesions). Pt remained full code during recent admissions.    In ED: -110s, RR 20s, sating 99% on BIPAP FiO2 40%. WBC 11k. CXR with patchy atelectasis in LLL and low lung volume, otherwise no focal consolidations. Received solumedrol 125mg IVP x1, 2.25% racepinephrine 0.5mL x1, duoneb x2, vanc/cefepime x1 dose. Admitted to Medicine for further management.    Hospital Course: Patient's course has been complicated by COVID positivity for which she has received Remdesivir and Decadron. She is on nocturnal AVAPS. Today, she reported had increasing oxygen demands and an episode of a. fib RVR controlled with rate control.    On assessment, she is lying in bed on AVAPS. Awakens to voice. Hemodynamically stable.       PAST MEDICAL & SURGICAL HISTORY:  MI (myocardial infarction)      Personal history of asbestosis      Gout      UTI (lower urinary tract infection)      ETOH abuse      CVA (cerebral vascular accident)      Tracheobronchomalacia      HTN (hypertension)      HLD (hyperlipidemia)      History of left shoulder fracture      History of benign breast tumor          FAMILY HISTORY:  No pertinent family history in first degree relatives        SOCIAL HISTORY:    Allergies    Toradol (Urticaria (Mild to Mod); Rash (Mild to Mod))    Intolerances        HOME MEDICATIONS:    REVIEW OF SYSTEMS:  [x] Unable to assess ROS because mental status    OBJECTIVE:  ICU Vital Signs Last 24 Hrs  T(C): 37.1 (23 Sep 2022 11:32), Max: 37.2 (22 Sep 2022 20:30)  T(F): 98.7 (23 Sep 2022 11:32), Max: 99 (22 Sep 2022 20:30)  HR: 113 (23 Sep 2022 16:33) (99 - 144)  BP: 95/70 (23 Sep 2022 14:50) (92/57 - 120/62)  BP(mean): --  ABP: --  ABP(mean): --  RR: 20 (23 Sep 2022 14:47) (18 - 27)  SpO2: 95% (23 Sep 2022 16:33) (94% - 100%)    O2 Parameters below as of 23 Sep 2022 14:47  Patient On (Oxygen Delivery Method): nasal cannula  O2 Flow (L/min): 3            09-22 @ 07:01  -  09-23 @ 07:00  --------------------------------------------------------  IN: 870 mL / OUT: 300 mL / NET: 570 mL    09-23 @ 07:01 - 09-23 @ 17:38  --------------------------------------------------------  IN: 490 mL / OUT: 400 mL / NET: 90 mL      CAPILLARY BLOOD GLUCOSE      POCT Blood Glucose.: 283 mg/dL (23 Sep 2022 17:12)      PHYSICAL EXAM:  GENERAL: Functional quadriplegia, obese, on AVAPs  HEAD:  Atraumatic, Normocephalic  EYES: EOMI, PERRLA, conjunctiva and sclera clear  NECK: Supple, No JVD  CHEST/LUNG: Clear to auscultation bilaterally; No rales, rhonchi, wheezing, or rubs. On AVAPs.  HEART: Regular rate and rhythm; No murmurs, rubs, or gallops  ABDOMEN: BSx4; Soft, nontender, nondistended  EXTREMITIES:  2+ Peripheral Pulses, brisk capillary refill. No clubbing, cyanosis, or edema  NERVOUS SYSTEM:  A&Ox3  SKIN: No rashes or lesions    HOSPITAL MEDICATIONS:  MEDICATIONS  (STANDING):  albuterol/ipratropium for Nebulization 3 milliLiter(s) Nebulizer <User Schedule>  allopurinol 100 milliGRAM(s) Oral daily  anastrozole 1 milliGRAM(s) Oral daily  atorvastatin 20 milliGRAM(s) Oral at bedtime  bisacodyl 5 milliGRAM(s) Oral at bedtime  buDESOnide    Inhalation Suspension 0.5 milliGRAM(s) Inhalation every 12 hours  chlorhexidine 2% Cloths 1 Application(s) Topical <User Schedule>  dextrose 50% Injectable 25 Gram(s) IV Push once  dextrose 50% Injectable 12.5 Gram(s) IV Push once  dextrose 50% Injectable 25 Gram(s) IV Push once  dextrose Oral Gel 15 Gram(s) Oral once  diltiazem    Tablet 30 milliGRAM(s) Enteral Tube every 6 hours  glucagon  Injectable 1 milliGRAM(s) IntraMuscular once  guaiFENesin Oral Liquid (Sugar-Free) 300 milliGRAM(s) Oral every 6 hours  insulin lispro (ADMELOG) corrective regimen sliding scale   SubCutaneous every 6 hours  insulin NPH human recombinant 6 Unit(s) SubCutaneous every 6 hours  levETIRAcetam  Solution 750 milliGRAM(s) Oral two times a day  levothyroxine 75 MICROGram(s) Oral daily  melatonin 3 milliGRAM(s) Oral at bedtime  methylPREDNISolone sodium succinate Injectable 20 milliGRAM(s) IV Push every 6 hours  rivaroxaban 10 milliGRAM(s) Oral daily  senna 2 Tablet(s) Oral at bedtime    MEDICATIONS  (PRN):  acetaminophen     Tablet .. 650 milliGRAM(s) Oral every 6 hours PRN Temp greater or equal to 38C (100.4F), Mild Pain (1 - 3)  polyethylene glycol 3350 17 Gram(s) Oral daily PRN Constipation      LABS:    09-23    x   |  x   |  x   ----------------------------<  x   x    |  x   |  0.56      TPro  6.5  /  Alb  3.5  /  TBili  0.4  /  DBili  0.1  /  AST  15  /  ALT  17  /  AlkPhos  218<H>  09-23    PT/INR - ( 23 Sep 2022 12:21 )   PT: 13.9 sec;   INR: 1.20 ratio                   MICROBIOLOGY:     RADIOLOGY:  [ ] Reviewed and interpreted by me    EKG:

## 2022-09-23 NOTE — CONSULT NOTE ADULT - ATTENDING COMMENTS
86F Hx Acute on Chronic Hypoxic Hypercarbic Respiratory Failure, Severe Tracheobronchomalacia, Multiple Bronch for Mucus Plugging, Bed Bound, Functional Quad after Hemorrhagic CVA, PEG, Nocturnal BiPAP, Metastatic Breast CA to Bone, Appendicular Perforation and Abscess, MRSA/MRSE Bacteremia, recent Hospital Acquired Covid +ve 9/8/22 on Remdesivir and Dexamethasone in respiratory distress this evening and placed on AVAPS.   - Baseline Encephalopathic and poorly responsive to verbal stimuli   - AVAPS with minimal setting adequate Spont TV and SpO2   - No Labs, ABG, CXR available for the last 4 days   - No impending Respiratory Failure or Aspiration noted   - Insufficient data for full evaluation   - No ICU admission indicated and deferred further management by PMD and Pulmonary Service     Patient seen and examined with ICU Resident/Fellow at bedside after lab data, medical records and radiology reports reviewed. I have read and agreeable in general with resident's Documented Note, Assessment and Management Plan which reflected my opinions from bedside round and discussion.
Mr. 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  Pt was admitted for management of Acute on Chronic respiratory failure 2/2 severe tracheomalacia and bronchospasm. Pt was managed with NIV and steroids. Pt was tested for DC planning yesterday and found to be COVID positive and ID consulted for the same.    WORKUP  COVID-19 PCR: Detected (09-16-22 @ 18:18)  COVID-19 PCR: NotDetec (09-13-22 @ 08:24)    DIAGNOSIS and IMPRESSION  Nosocomial COVID 19  Acute on Chronic respiratory failure 2/2 severe tracheomalacia and bronchospasm.  metastatic breast cancer to bones s/p mastectomy (pt was unable to tolerate further cancer workup of spine lesions).     Pt started on remdesevir (9/17)  Pt with stable O2 requirements 3 L during day and BiPAP at night  Pt was transitioned from her prednisone taper to solumedrol 20 IV Q6 by pulm.    RECOMMENDATIONS  Unclear if pt has new covid symptoms in regards to SOB given complicated pulmonary issues -> Recommend short course of remdesivir with close monitoring. follow creatinine and LFTs  Recommend following Labs Q48hrs - ESR; CRP; Procalcitonin; Ferritin; LDH; d-dimer  Continue supplemental O2 and supportive care per primary team  Continue Contact and Airborne Isolation precautions    Kasandra Adler M.D. ,   please reach via teams   If no answer, or after 5PM/ weekends,  then please call  [phone]    Assessment and plan discussed with the primary team .

## 2022-09-23 NOTE — PROVIDER CONTACT NOTE (CRITICAL VALUE NOTIFICATION) - DATE AND TIME:
Hospitalist Progress Note      PCP: Carlos Lujan MD    Date of Admission: 5/24/2022  Days in the hospital: 3    Hospital Course:   Ms. Kody Mead, a [de-identified]y.o. year old female  who  has a past medical history of Atrial fibrillation ([address] Atrial fibrillation ([address] Diabetes mellitus ([address] Diastolic CHF, acute on chronic ([address] Diastolic dysfunction, Fatty liver disease, nonalcoholic, Hypertension, Hypoprothrombinemia ([address] Hypotension, LVH (left ventricular hypertrophy), Mitral regurgitation, Open-angle glaucoma, Pulmonary regurgitation, and Rectal bleed.      Patient presented to the emergency department after a fall that occurred on the 19th of this month. She is also reporting generalized weakness, and neck pain. Patient reported she slipped off a chair landing on her tailbone. Neck pain due to flexion and extension. After the fall she laid on the ground for several hours unable to get up due to generalized weakness. She initially presented to [facility] emergency department which work-up did not show any acute processes. She was discharged home. She continued to have pain around her coccyx and neck pain. She is unable to stand due to generalized weakness. She also reports she had a cough productive of sputum. Denies fever, chills, chest pain. She was vaccinated x2 for COVID-19. CT chest shows right upper lobe pneumonia with endobronchial spread of infection throughout the lungs. CT cervical spine shows a few bubbles of gas on linear lucency at the C5-6 level which could represent fracture. Neurosurgery was consulted in emergency department. They advised c-collar and they will see the patient in the morning. Medicine consulted for admission. Subjective  Patient seen and examined at bedside. Denies any headache, dizziness, chest pain. Denies any neck pain. Seen by neurosurgery recommended MRI of the cervical spine. Saturating well on room air.     She states that the brace on her neck feels different. Exam:    /87   Pulse 82   Temp 98.4 °F (36.9 °C) (Oral)   Resp 18   Ht 5' 5\" (1.651 m)   Wt 192 lb 6.4 oz (87.3 kg)   SpO2 100%   BMI 35.19 kg/m²       HEENT: No pallor, no icterus. C-collar noted  Respiratory:  CTA, good air entry. Cardiovascular: RRR, no murmur. Abdomen: Soft, non-tender, BS noted. Musculoskeletal: No joint pains or joint swelling noted. Neurologic: awake, alert, forgetful     Assessment/Plan:  1.) COVID-19 infection, currently on steroids, saturating well on room air, continue to follow closely. Chest xray is pending. 2.) A. fib with RVR, heart rate better controlled, monitor, INR 2.5 today, decrease dose of Coumadin to 5 mg, awaiting cardiology evaluation. 3.) C5- C6 fracture, seen by neurosurgery, MRI of the cervical spine reviewed; As per neurosurgery. 3.) Leukopenia secondary to COVID-19 infection: improving. · Awaiting PT OT evaluation. Labs:   Recent Labs     05/25/22 0125 05/26/22  0559 05/27/22  0511   WBC 4.0* 3.6* 4.1*   HGB 12.0 11.1* 11.0*   HCT 34.7 33.0* 32.2*   PLT 92* 112* 135     Recent Labs     05/25/22 0125 05/25/22  0125 05/26/22  0559 05/27/22  0511     --  135 132   K 4.0   < > 4.4  4.4 4.5     --  101 100   CO2 17*  --  19* 21*   BUN 18  --  29* 31*   CREATININE 1.0  --  1.0 1.0   CALCIUM 8.5*  --  8.6 8.8    < > = values in this interval not displayed. Recent Labs     05/25/22 0125 05/26/22  0559 05/27/22  0511   AST 43* 29 25   ALT 29 27 25   BILITOT 0.5 0.4 0.3   ALKPHOS 100 85 88     Recent Labs     05/25/22  0125 05/26/22  0854 05/27/22  0511   INR 1.8 2.5 3.3     No results for input(s): Darinel Hawley in the last 72 hours.     Medications:  Reviewed    Infusion Medications    sodium chloride       Scheduled Medications    warfarin placeholder: dosing by pharmacy   Other RX Placeholder    dilTIAZem  360 mg Oral Daily    folic acid  1 mg Oral QAM    glipiZIDE  2.5 mg Oral QAM AC  latanoprost  1 drop Both Eyes Nightly    lisinopril  20 mg Oral Nightly    primidone  150 mg Oral BID    spironolactone  25 mg Oral QAM    Vitamin D  1,000 Units Oral QAM    sodium chloride flush  10 mL IntraVENous 2 times per day    dexamethasone  6 mg IntraVENous Q24H     PRN Meds: sodium chloride flush, sodium chloride, promethazine **OR** ondansetron, polyethylene glycol, acetaminophen **OR** acetaminophen, potassium chloride **OR** potassium alternative oral replacement **OR** potassium chloride, magnesium sulfate      Intake/Output Summary (Last 24 hours) at 5/27/2022 2359  Last data filed at 5/27/2022 2149  Gross per 24 hour   Intake 780 ml   Output 700 ml   Net 80 ml     Body mass index is 35.19 kg/m². · Diet  ADULT DIET; Regular; Low Sodium (2 gm)    · Code Status  Full Code         Electronically signed by Darrell Iglesias MD on 5/27/2022 at 11:59 PM  [employer]   Please contact me through perfect serve    NOTE: This report was transcribed using voice recognition software. Every effort was made to ensure accuracy; however, inadvertent computerized transcription errors may be present. 23-Sep-2022 11:01 23-Sep-2022 10:59

## 2022-09-23 NOTE — PROVIDER CONTACT NOTE (CRITICAL VALUE NOTIFICATION) - ACTION/TREATMENT ORDERED:
ACP aware, labs to be reordered and placed in phlebotomy
no new orders given,will continue to monitor pt closely

## 2022-09-23 NOTE — CONSULT NOTE ADULT - ASSESSMENT
Assessment  DMT2: 87y Female with DM T2 with hyperglycemia, A1C? , now on insulin, blood sugars running high and not at target, no hypoglycemias, on peg tube feeds, IV steroids.  Covid: on medications, stable, monitored.  Hypothyroidism: On Synthroid 75 mcg po daily, ?compliance, no TFTs in chart.  HLD: on statin.        Jeanie Gao MD  Cell: [phone]  Office: [phone]               Assessment  DMT2: 87y Female with DM T2 with hyperglycemia, A1C? , now on insulin, blood sugars running high and not at target,  no hypoglycemias, on peg tube feeds, IV steroids.  Covid: on medications, stable, monitored.  Hypothyroidism: On Synthroid 75 mcg po daily, ?compliance, no TFTs in chart.  HLD: on statin.        Jeanie Gao MD  Cell: [phone]  Office: [phone]

## 2022-09-23 NOTE — PROGRESS NOTE ADULT - SUBJECTIVE AND OBJECTIVE BOX
CARDIOLOGY     PROGRESS  NOTE   ________________________________________________    CHIEF COMPLAINT:Patient is a 87y old  Female who presents with a chief complaint of SOB (22 Sep 2022 19:44)  no complain.  	  REVIEW OF SYSTEMS:  CONSTITUTIONAL: No fever, weight loss, or fatigue  EYES: No eye pain, visual disturbances, or discharge  ENT:  No difficulty hearing, tinnitus, vertigo; No sinus or throat pain  NECK: No pain or stiffness  RESPIRATORY: No cough, wheezing, chills or hemoptysis; + Shortness of Breath  CARDIOVASCULAR: No chest pain, palpitations, passing out, dizziness, or leg swelling  GASTROINTESTINAL: No abdominal or epigastric pain. No nausea, vomiting, or hematemesis; No diarrhea or constipation. No melena or hematochezia.  GENITOURINARY: No dysuria, frequency, hematuria, or incontinence  NEUROLOGICAL: No headaches, memory loss, loss of strength, numbness, or tremors  SKIN: No itching, burning, rashes, or lesions   LYMPH Nodes: No enlarged glands  ENDOCRINE: No heat or cold intolerance; No hair loss  MUSCULOSKELETAL: No joint pain or swelling; No muscle, back, or extremity pain  PSYCHIATRIC: No depression, anxiety, mood swings, or difficulty sleeping  HEME/LYMPH: No easy bruising, or bleeding gums  ALLERGY AND IMMUNOLOGIC: No hives or eczema	    [ ] All others negative	  [x ] Unable to obtain    PHYSICAL EXAM:  T(C): 37.2 (09-23-22 @ 06:09), Max: 37.2 (09-22-22 @ 11:23)  HR: 105 (09-23-22 @ 06:09) (102 - 111)  BP: 120/62 (09-23-22 @ 06:09) (109/77 - 126/48)  RR: 18 (09-23-22 @ 06:09) (18 - 29)  SpO2: 98% (09-23-22 @ 06:09) (97% - 100%)  Wt(kg): --  I&O's Summary    22 Sep 2022 07:01  -  23 Sep 2022 07:00  --------------------------------------------------------  IN: 800 mL / OUT: 300 mL / NET: 500 mL        Appearance: Normal	  HEENT:   Normal oral mucosa, PERRL, EOMI	  Lymphatic: No lymphadenopathy  Cardiovascular: Normal S1 S2, No JVD, + murmurs, + edema  Respiratory: rhonchi  Gastrointestinal:  Soft, Non-tender, + BS	  Skin: No rashes, No ecchymoses, No cyanosis	  Extremities: Normal range of motion, No clubbing, cyanosis , + edema  Vascular: Peripheral pulses palpable 2+ bilaterally    MEDICATIONS  (STANDING):  albuterol/ipratropium for Nebulization 3 milliLiter(s) Nebulizer <User Schedule>  allopurinol 100 milliGRAM(s) Oral daily  anastrozole 1 milliGRAM(s) Oral daily  atorvastatin 20 milliGRAM(s) Oral at bedtime  bisacodyl 5 milliGRAM(s) Oral at bedtime  buDESOnide    Inhalation Suspension 0.5 milliGRAM(s) Inhalation every 12 hours  chlorhexidine 2% Cloths 1 Application(s) Topical <User Schedule>  diltiazem    Tablet 30 milliGRAM(s) Oral three times a day  guaiFENesin Oral Liquid (Sugar-Free) 300 milliGRAM(s) Oral every 6 hours  insulin lispro (ADMELOG) corrective regimen sliding scale   SubCutaneous every 6 hours  levETIRAcetam  Solution 750 milliGRAM(s) Oral two times a day  levothyroxine 75 MICROGram(s) Oral daily  melatonin 3 milliGRAM(s) Oral at bedtime  methylPREDNISolone sodium succinate Injectable 20 milliGRAM(s) IV Push every 6 hours  rivaroxaban 10 milliGRAM(s) Oral daily  senna 2 Tablet(s) Oral at bedtime      TELEMETRY: 	    ECG:  	  RADIOLOGY:  OTHER: 	  	  LABS:	 	    CARDIAC MARKERS:                  proBNP: Serum Pro-Brain Natriuretic Peptide: 461 pg/mL (09-08 @ 20:56)  Serum Pro-Brain Natriuretic Peptide: 155 pg/mL (08-27 @ 21:06)    Lipid Profile:   HgA1c:   TSH:         Assessment and plan  ---------------------------  86F w/ extensive hx including severe tracheobronchomalacia (c/b hypoxia 2/2 mucous plugging and recurrent L lung collapse), 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 Very limited hx obtained from pt given mental status, dyspnea, and use of BIPAP. Unable to reach sonSy. History obtained from staff/chart review.    Per ED staff who saw pt when she first arrived, pt was notably dyspneic with increased WOB and wheezing. Supposedly missed use of nightly BIPAP at rehab. Reportedly denied fever and cough. On encounter for admission, pt with BIPAP mask on, appearing slightly lethargic and mildly dyspneic, but was able to answer some questions. Pt endorsed having SOB. Denied pain. Seemed to believe initially that she was at [facility]? but was difficult to understand her responses.    Of note, pt has multiple recent admissions with similar presentation. Most recently was hospitalized from 8/28/22-9/8/22 for SOB, treated with airway clearance and completed 5-day course of Zosyn for empiric coverage of PNA (though per Pulm, unlikely to have PNA). Course c/b PEG displacement and subsequent reinsertion by IR on 8/30. In addition to her usual PEG feeds, pt was also started on puree diet with moderately thickened fluids for pleasure feeds. Also was started on anastrozole for most likely dx of metastatic breast cancer to bones (pt was unable to tolerate further cancer workup of spine lesions). Pt remained full code during recent admissions.  pt with metastatic ca to the bone with sob sec to obesity and underlying lung and cardiac disease  increase sob sec to missing BiPAP at night  may consider pulmonary eval  may consider hospice care/ palliative care  continue Diltiazem  pt will need home o2 and Bipap at night  discussed with family at the bed side   dvt prophylaxis high risk on xarelto  pt with bone mets, no further nieto/ onc noted  on steroid dose has increased

## 2022-09-23 NOTE — PROGRESS NOTE ADULT - SUBJECTIVE AND OBJECTIVE BOX
afebriel    REVIEW OF SYSTEMS:  GEN: no fever,    no chills  RESP: no SOB,   no cough  CVS: no chest pain,   no palpitations  GI: no abdominal pain,   no nausea,   no vomiting,   no constipation,   no diarrhea  : no dysuria,   no frequency  NEURO: no headache,   no dizziness  PSYCH: no depression,   not anxious  Derm : no rash    MEDICATIONS  (STANDING):  albuterol/ipratropium for Nebulization 3 milliLiter(s) Nebulizer <User Schedule>  allopurinol 100 milliGRAM(s) Oral daily  anastrozole 1 milliGRAM(s) Oral daily  atorvastatin 20 milliGRAM(s) Oral at bedtime  bisacodyl 5 milliGRAM(s) Oral at bedtime  buDESOnide    Inhalation Suspension 0.5 milliGRAM(s) Inhalation every 12 hours  chlorhexidine 2% Cloths 1 Application(s) Topical <User Schedule>  diltiazem    Tablet 30 milliGRAM(s) Oral three times a day  guaiFENesin Oral Liquid (Sugar-Free) 300 milliGRAM(s) Oral every 6 hours  insulin lispro (ADMELOG) corrective regimen sliding scale   SubCutaneous every 6 hours  levETIRAcetam  Solution 750 milliGRAM(s) Oral two times a day  levothyroxine 75 MICROGram(s) Oral daily  melatonin 3 milliGRAM(s) Oral at bedtime  methylPREDNISolone sodium succinate Injectable 20 milliGRAM(s) IV Push every 6 hours  rivaroxaban 10 milliGRAM(s) Oral daily  senna 2 Tablet(s) Oral at bedtime    MEDICATIONS  (PRN):  acetaminophen     Tablet .. 650 milliGRAM(s) Oral every 6 hours PRN Temp greater or equal to 38C (100.4F), Mild Pain (1 - 3)  polyethylene glycol 3350 17 Gram(s) Oral daily PRN Constipation      Vital Signs Last 24 Hrs  T(C): 37.2 (23 Sep 2022 06:09), Max: 37.2 (22 Sep 2022 11:23)  T(F): 98.9 (23 Sep 2022 06:09), Max: 99 (22 Sep 2022 11:23)  HR: 105 (23 Sep 2022 06:09) (105 - 111)  BP: 120/62 (23 Sep 2022 06:09) (109/77 - 126/48)  BP(mean): --  RR: 18 (23 Sep 2022 06:09) (18 - 29)  SpO2: 98% (23 Sep 2022 06:09) (97% - 100%)    Parameters below as of 23 Sep 2022 06:09  Patient On (Oxygen Delivery Method): room air      CAPILLARY BLOOD GLUCOSE      POCT Blood Glucose.: 208 mg/dL (23 Sep 2022 05:59)  POCT Blood Glucose.: 214 mg/dL (22 Sep 2022 23:26)  POCT Blood Glucose.: 255 mg/dL (22 Sep 2022 18:27)  POCT Blood Glucose.: 214 mg/dL (22 Sep 2022 11:15)    I&O's Summary    22 Sep 2022 07:01  -  23 Sep 2022 07:00  --------------------------------------------------------  IN: 800 mL / OUT: 300 mL / NET: 500 mL        PHYSICAL EXAM:  HEAD:  Atraumatic, Normocephalic  NECK: Supple, No   JVD  CHEST/LUNG:   no     rales,     no,    rhonchi  HEART: Regular rate and rhythm;         murmur  ABDOMEN: Soft, Nontender, ;   EXTREMITIES:    no    edema  NEUROLOGY:  arousable    LABS:                    ABG - ( 21 Sep 2022 14:19 )  pH, Arterial: 7.51  pH, Blood: x     /  pCO2: 33    /  pO2: 158   / HCO3: 26    / Base Excess: 3.6   /  SaO2: 100.0                         Consultant(s) Notes Reviewed:      Care Discussed with Consultants/Other Providers:

## 2022-09-23 NOTE — CONSULT NOTE ADULT - ASSESSMENT
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 Admitted for respiratory failure, found to have COVID pneumonia s/p Remdesivir & Decadron.    #Chronic respiratory failure with hypoxia  Patient with history of severe tracheobronchomalacia on nocturnal AVAPs exacerbated by COVID-19 pneumonia s/p treatment. Patient has been on 4-5L NC. Today with episode of tachypnea & a. fib with RVR. Rates controlled and patient placed on AVAPs. She is functionally quadriplegic and appears at reported baseline on our assessment. Hemodynamically stable with appropriate O2 sat on AVAPS. No up-to-date labs or imaging to assist with evaluation.  - Patient is not a MICU candidate at this time  - Continue care per primary team & pulmonary service  - Recommend palliative consult    Discussed with MICU attending Dr. Carey. Discussed with primary team PA.

## 2022-09-24 NOTE — PROGRESS NOTE ADULT - SUBJECTIVE AND OBJECTIVE BOX
Chief complaint    Patient is a 87y old  Female who presents with a chief complaint of SOB (24 Sep 2022 12:25)   Review of systems  Patient in bed, appears comfortable.    Labs and Fingersticks  CAPILLARY BLOOD GLUCOSE      POCT Blood Glucose.: 167 mg/dL (24 Sep 2022 12:07)  POCT Blood Glucose.: 155 mg/dL (24 Sep 2022 06:32)  POCT Blood Glucose.: 172 mg/dL (23 Sep 2022 23:22)  POCT Blood Glucose.: 283 mg/dL (23 Sep 2022 17:12)      Anion Gap, Serum: 12 (09-24 @ 11:34)      Calcium, Total Serum: 8.8 (09-24 @ 11:34)  Albumin, Serum: 3.5 (09-24 @ 11:34)  Albumin, Serum: 3.5 (09-23 @ 10:35)    Alanine Aminotransferase (ALT/SGPT): 16 (09-24 @ 11:34)  Alanine Aminotransferase (ALT/SGPT): 17 (09-23 @ 10:35)  Alkaline Phosphatase, Serum: 216 *H* (09-24 @ 11:34)  Alkaline Phosphatase, Serum: 218 *H* (09-23 @ 10:35)  Aspartate Aminotransferase (AST/SGOT): 14 (09-24 @ 11:34)  Aspartate Aminotransferase (AST/SGOT): 15 (09-23 @ 10:35)        09-24    142  |  109<H>  |  31<H>  ----------------------------<  142<H>  4.3   |  21<L>  |  0.59    Ca    8.8      24 Sep 2022 11:34    TPro  6.0  /  Alb  3.5  /  TBili  0.5  /  DBili  0.2  /  AST  14  /  ALT  16  /  AlkPhos  216<H>  09-24                        8.7    8.80  )-----------( 233      ( 24 Sep 2022 11:34 )             29.4     Medications  MEDICATIONS  (STANDING):  albuterol/ipratropium for Nebulization 3 milliLiter(s) Nebulizer <User Schedule>  allopurinol 100 milliGRAM(s) Oral daily  anastrozole 1 milliGRAM(s) Oral daily  atorvastatin 20 milliGRAM(s) Oral at bedtime  bisacodyl 5 milliGRAM(s) Oral at bedtime  buDESOnide    Inhalation Suspension 0.5 milliGRAM(s) Inhalation every 12 hours  chlorhexidine 2% Cloths 1 Application(s) Topical <User Schedule>  dextrose 50% Injectable 25 Gram(s) IV Push once  dextrose 50% Injectable 12.5 Gram(s) IV Push once  dextrose 50% Injectable 25 Gram(s) IV Push once  dextrose Oral Gel 15 Gram(s) Oral once  digoxin  Injectable 250 MICROGram(s) IV Push every 4 hours  diltiazem    Tablet 30 milliGRAM(s) Enteral Tube every 6 hours  glucagon  Injectable 1 milliGRAM(s) IntraMuscular once  guaiFENesin Oral Liquid (Sugar-Free) 300 milliGRAM(s) Oral every 6 hours  insulin lispro (ADMELOG) corrective regimen sliding scale   SubCutaneous every 6 hours  insulin NPH human recombinant 6 Unit(s) SubCutaneous every 6 hours  levETIRAcetam  Solution 750 milliGRAM(s) Oral two times a day  levothyroxine 75 MICROGram(s) Oral daily  melatonin 3 milliGRAM(s) Oral at bedtime  methylPREDNISolone sodium succinate Injectable 20 milliGRAM(s) IV Push every 6 hours  rivaroxaban 20 milliGRAM(s) Oral with dinner  senna 2 Tablet(s) Oral at bedtime      Physical Exam  General: Patient comfortable in bed  Vital Signs Last 12 Hrs  T(F): 97.9 (09-24-22 @ 11:22), Max: 98.6 (09-24-22 @ 04:00)  HR: 116 (09-24-22 @ 11:22) (92 - 116)  BP: 111/76 (09-24-22 @ 11:22) (106/70 - 111/76)  BP(mean): --  RR: 23 (09-24-22 @ 11:22) (18 - 23)  SpO2: 96% (09-24-22 @ 11:22) (93% - 98%)  Neck: No palpable thyroid nodules.  CVS: S1S2, No murmurs  Respiratory: No wheezing, no crepitations  GI: Abdomen soft, bowel sounds positive  Musculoskeletal:  edema lower extremities.     Diagnostics    Free Thyroxine, Serum: AM Sched. Collection: 24-Sep-2022 06:00  Cancel Reason: Dup Cancel (09-23 @ 12:07)  Thyroid Stimulating Hormone, Serum: AM Sched. Collection: 24-Sep-2022 06:00 (09-23 @ 12:07)  A1C with Estimated Average Glucose: Routine (09-23 @ 12:06)  A1C with Estimated Average Glucose: Routine (09-23 @ 11:58)  A1C with Estimated Average Glucose: Routine (09-23 @ 10:31)  Free Thyroxine, Serum: AM Sched. Collection: 24-Sep-2022 06:00 (09-23 @ 09:23)  Thyroid Stimulating Hormone, Serum: AM Sched. Collection: 24-Sep-2022 06:00 (09-23 @ 09:23)

## 2022-09-24 NOTE — PROGRESS NOTE ADULT - ASSESSMENT
89    year old female    h/o hemorrhagic CVA, has  PEG,  HTN, MI,   HLD, gout, right ca  breast   right Ca breast. s/p mastectomy in 2019,  s/p  sentinel node  localization.  4/4,  were  negative  peg dislodged.  IR   replacements  on 1/3/22  s/p rrt  . in past,  for hypoxia. cxr with complete  collapsed  of  left lung, needing broch,   s/ p  bronchoscopy , pt  with  tracheomalacia  and  collapsed  airways,  makes  this a  difficult  situation, as there is no good rx for this     h/o bacteremia. on   pna, perforated  appendicitis,  ?  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 this will not alter rx. pt  redvd  extended  course  of ab   on iv  vanco and ertapenem.  till  2/9/.22.        *  admitted with   presumed  resp  failure/ pt was  sent back from [facility], the same day   pt seen in er.  appears   at her naseline.  no  wheezing /  atousable    ENT eval w/ laryngoscopy notable for vocal cords mobile and intact, no edema, upper airway patent     hypertonic saline for airway clearance   cxr, no pna     *   s/p cva, has  PEG,  npo  ,   on  synthroid, Keppra  ,  *   HTN, on  cardizem,  per  card dr jamison  *  h/o  Ca breast. /, s/p  R  mastectomy  *   obesity, bmi  was   41, now  is  30   *     c/c left  leg contracture ,  remains  bed  bound. functional  paraplegia,  needs  assistance  for  all her  adl's   *  pt  with  h/o  tracheomalacia  and  collapsed  airways,      given pt;s  mlple co morbidities,  propensity  for  lung collpse,   pt is  at risk for  re admissions     on nocturnal  bipap    difficult  situation, a s there  is no  good  rx  for  pt's  recurrent lung collapse hypoxia     h/o  of  chronic    mild  tachycardia          *   CT chest. lytic  lesions, T  spine/  ribs/ humerus/  oncoloigy  magnus pacheco.  suspect  metastatic ca breast        son. guicho Dan/ oncologist  has  also  discussed  with  son,  futility of  pursuing  bx. a s pt  is  not an ideal   candidate  for  chemo    CT  A.p ,/ prelim ,  pelvic  mets  per  oncology,   suspect  metastatic ca  breast,  was  awaiting   mri  spine/  pt unable  to tolerate  mri   per oncology, no  further  intervention/  and on  Arimidex, now,   per d r ohri   obesity,  bed bound.  functional paraplegia    per  son,   rehab promptly  returned   to er/ as they  did not  have  a  bipap machine /  care cortn to f/.p, needs  24/7  O2/ nocturnal bipap   difficult  situation,  bed  bound. obesity. c/c  hypercarbia, needing , prn /  nocturnal bipap/  with intermittent tachypnea    now  is  covid +. on iv steroid/ wheezing/ remdissivir/, seen by  house  ID     dvt ppx/  xarelto  for   30  days/  remains on iv steroid  pt  with  frequent episodes  of tachypnea. has  obstructive /restrictive   lung  disease ,  obesity/ RAQUEL/ hypercarbia   remains on iv  steroid,  difficult  situation   called son, caseyple  times,  message  left/    can  only   start   d/c  planning,  when clered   by  pulm   pt  with prolonged   admission stay, , hence  daily  labs not  needed/ labs  pending today   noted  from chart, that  icu  eval   was called  by  floor/ pt  remains  at  her  baseline  minmally  verbal, on iv  steroids      per  son, pt is  full code

## 2022-09-24 NOTE — PROGRESS NOTE ADULT - SUBJECTIVE AND OBJECTIVE BOX
afberile.   REVIEW OF SYSTEMS:  GEN: no fever,    no chills  RESP: no SOB,   no cough  CVS: no chest pain,   no palpitations  GI: no abdominal pain,   no nausea,   no vomiting,   no constipation,   no diarrhea  : no dysuria,   no frequency  NEURO: no headache,   no dizziness  PSYCH: no depression,   not anxious  Derm : no rash    MEDICATIONS  (STANDING):  albuterol/ipratropium for Nebulization 3 milliLiter(s) Nebulizer <User Schedule>  allopurinol 100 milliGRAM(s) Oral daily  anastrozole 1 milliGRAM(s) Oral daily  atorvastatin 20 milliGRAM(s) Oral at bedtime  bisacodyl 5 milliGRAM(s) Oral at bedtime  buDESOnide    Inhalation Suspension 0.5 milliGRAM(s) Inhalation every 12 hours  chlorhexidine 2% Cloths 1 Application(s) Topical <User Schedule>  dextrose 50% Injectable 25 Gram(s) IV Push once  dextrose 50% Injectable 12.5 Gram(s) IV Push once  dextrose 50% Injectable 25 Gram(s) IV Push once  dextrose Oral Gel 15 Gram(s) Oral once  diltiazem    Tablet 30 milliGRAM(s) Enteral Tube every 6 hours  glucagon  Injectable 1 milliGRAM(s) IntraMuscular once  guaiFENesin Oral Liquid (Sugar-Free) 300 milliGRAM(s) Oral every 6 hours  insulin lispro (ADMELOG) corrective regimen sliding scale   SubCutaneous every 6 hours  insulin NPH human recombinant 6 Unit(s) SubCutaneous every 6 hours  levETIRAcetam  Solution 750 milliGRAM(s) Oral two times a day  levothyroxine 75 MICROGram(s) Oral daily  melatonin 3 milliGRAM(s) Oral at bedtime  methylPREDNISolone sodium succinate Injectable 20 milliGRAM(s) IV Push every 6 hours  rivaroxaban 10 milliGRAM(s) Oral daily  senna 2 Tablet(s) Oral at bedtime    MEDICATIONS  (PRN):  acetaminophen     Tablet .. 650 milliGRAM(s) Oral every 6 hours PRN Temp greater or equal to 38C (100.4F), Mild Pain (1 - 3)  polyethylene glycol 3350 17 Gram(s) Oral daily PRN Constipation      Vital Signs Last 24 Hrs  T(C): 37 (24 Sep 2022 04:00), Max: 37.1 (23 Sep 2022 11:32)  T(F): 98.6 (24 Sep 2022 04:00), Max: 98.7 (23 Sep 2022 11:32)  HR: 105 (24 Sep 2022 05:40) (98 - 144)  BP: 106/70 (24 Sep 2022 04:00) (92/57 - 114/59)  BP(mean): --  RR: 18 (24 Sep 2022 04:00) (18 - 27)  SpO2: 98% (24 Sep 2022 05:40) (93% - 99%)    Parameters below as of 24 Sep 2022 04:00  Patient On (Oxygen Delivery Method): nasal cannula      CAPILLARY BLOOD GLUCOSE      POCT Blood Glucose.: 155 mg/dL (24 Sep 2022 06:32)  POCT Blood Glucose.: 172 mg/dL (23 Sep 2022 23:22)  POCT Blood Glucose.: 283 mg/dL (23 Sep 2022 17:12)  POCT Blood Glucose.: 263 mg/dL (23 Sep 2022 14:06)  POCT Blood Glucose.: 219 mg/dL (23 Sep 2022 11:27)    I&O's Summary    23 Sep 2022 07:01  -  24 Sep 2022 07:00  --------------------------------------------------------  IN: 490 mL / OUT: 400 mL / NET: 90 mL        PHYSICAL EXAM:  HEAD:  Atraumatic, Normocephalic  NECK: Supple, No   JVD  CHEST/LUNG:   no     rales,     no,    rhonchi  HEART: Regular rate and rhythm;         murmur  ABDOMEN: Soft, Nontender, ;   EXTREMITIES:    no    edema  NEUROLOGY:  arousable    LABS:    09-23    x   |  x   |  x   ----------------------------<  x   x    |  x   |  0.56      TPro  6.5  /  Alb  3.5  /  TBili  0.4  /  DBili  0.1  /  AST  15  /  ALT  17  /  AlkPhos  218<H>  09-23    PT/INR - ( 23 Sep 2022 12:21 )   PT: 13.9 sec;   INR: 1.20 ratio                     09-23 @ 10:41  Performed In Lab  --              Consultant(s) Notes Reviewed:      Care Discussed with Consultants/Other Providers:

## 2022-09-24 NOTE — PROGRESS NOTE ADULT - ASSESSMENT
Assessment  DMT2: 87y Female with DM T2 with hyperglycemia, A1C? , now on insulin, blood sugars trending down, no hypoglycemias, on peg tube feeds, IV steroids.  Covid: on medications, stable, monitored.  Hypothyroidism: On Synthroid 75 mcg po daily, ?compliance, no TFTs in chart.  HLD: on statin.        Jeanie Gao MD  Cell: [phone]  Office: [phone]

## 2022-09-24 NOTE — PROGRESS NOTE ADULT - SUBJECTIVE AND OBJECTIVE BOX
FRANKI MEHTA        [id number]  09-24-22 @ 10:35  ------------------------------------------------------------------------------------  [facility]     PROGRESS NOTE    Relatively alert and comfortable on NC.  Notes feeling a bit better.  Denies pain.  Feeling thirsty.  -----------------------------------------------------------------------------------  REVIEW OF SYSTEMS: (except as described above)  Constitutional: As per interval history  HEENT: Within normal limits  CV: As per interval history  Resp: As per interval history  GI: Within normal limits   : Within normal limits  Musculoskeletal: Within normal limits  Skin: Within normal limits  Neurological: Within normal limits  Psychiatric: Within normal limits  Endocrine: Within normal limits  Hematologic/Lymphatic: Within normal limits  Allergic/Immunologic: Within normal limits    [ ] Unable to assess ROS because   -------------------------------------------------------------------------------------  PAST MEDICAL & SURGICAL HISTORY:  Shortness of breath    No pertinent family history in first degree relatives    Handoff    MEWS Score    MI (myocardial infarction)    HTN (hypertension)    Personal history of asbestosis    Gout    Dyslipidemia    UTI (lower urinary tract infection)    ETOH abuse    CVA (cerebral vascular accident)    Tracheobronchomalacia    HTN (hypertension)    HLD (hyperlipidemia)    Shortness of breath    Shortness of breath    Tracheobronchomalacia    CVA (cerebral vascular accident)    CAD (coronary artery disease)    Gout    HTN (hypertension)    HLD (hyperlipidemia)    Leukocytosis    Metastatic breast cancer    Prophylactic measure    Moderate aortic stenosis    Hypothyroid    DM (diabetes mellitus)    History of left shoulder fracture    History of benign breast tumor    RESP DISTRESS    0    [email or web address]      --------------------------------------------------------------------------------------    MEDICATIONS:     Pulmonary "  albuterol/ipratropium for Nebulization 3 milliLiter(s) Nebulizer <User Schedule>  buDESOnide    Inhalation Suspension 0.5 milliGRAM(s) Inhalation every 12 hours  guaiFENesin Oral Liquid (Sugar-Free) 300 milliGRAM(s) Oral every 6 hours      Anti-microbials:      Cardiovascular:  diltiazem    Tablet 30 milliGRAM(s) Enteral Tube every 6 hours      Other:  acetaminophen     Tablet .. 650 milliGRAM(s) Oral every 6 hours PRN  allopurinol 100 milliGRAM(s) Oral daily  anastrozole 1 milliGRAM(s) Oral daily  atorvastatin 20 milliGRAM(s) Oral at bedtime  bisacodyl 5 milliGRAM(s) Oral at bedtime  chlorhexidine 2% Cloths 1 Application(s) Topical <User Schedule>  dextrose 50% Injectable 25 Gram(s) IV Push once  dextrose 50% Injectable 12.5 Gram(s) IV Push once  dextrose 50% Injectable 25 Gram(s) IV Push once  dextrose Oral Gel 15 Gram(s) Oral once  glucagon  Injectable 1 milliGRAM(s) IntraMuscular once  insulin lispro (ADMELOG) corrective regimen sliding scale   SubCutaneous every 6 hours  insulin NPH human recombinant 6 Unit(s) SubCutaneous every 6 hours  levETIRAcetam  Solution 750 milliGRAM(s) Oral two times a day  levothyroxine 75 MICROGram(s) Oral daily  melatonin 3 milliGRAM(s) Oral at bedtime  methylPREDNISolone sodium succinate Injectable 20 milliGRAM(s) IV Push every 6 hours  polyethylene glycol 3350 17 Gram(s) Oral daily PRN  rivaroxaban 10 milliGRAM(s) Oral daily  senna 2 Tablet(s) Oral at bedtime    --------------------------------------------------------------------------------------    OBJECTIVE:        I&O's Detail    23 Sep 2022 07:01  -  24 Sep 2022 07:00  --------------------------------------------------------  IN:    Jevity 1.5: 490 mL  Total IN: 490 mL    OUT:    Oral Fluid: 0 mL    Voided (mL): 400 mL  Total OUT: 400 mL    Total NET: 90 mL    CAPILLARY BLOOD GLUCOSE    POCT Blood Glucose.: 155 mg/dL (24 Sep 2022 06:32)  POCT Blood Glucose.: 172 mg/dL (23 Sep 2022 23:22)  POCT Blood Glucose.: 283 mg/dL (23 Sep 2022 17:12)  POCT Blood Glucose.: 263 mg/dL (23 Sep 2022 14:06)  POCT Blood Glucose.: 219 mg/dL (23 Sep 2022 11:27)    ------------------------------------------------------------------------------------------  PHYSICAL EXAM:       ICU Vital Signs Last 24 Hrs  T(C): 37 (24 Sep 2022 04:00), Max: 37.1 (23 Sep 2022 11:32)  T(F): 98.6 (24 Sep 2022 04:00), Max: 98.7 (23 Sep 2022 11:32)  HR: 92 (24 Sep 2022 10:28) (92 - 144)  BP: 106/70 (24 Sep 2022 04:00) (92/57 - 114/59)  BP(mean): --  ABP: --  ABP(mean): --  RR: 18 (24 Sep 2022 04:00) (18 - 27)  SpO2: 96% (24 Sep 2022 10:28) (93% - 99%)    O2 Parameters below as of 24 Sep 2022 04:00  Patient On (Oxygen Delivery Method): nasal cannula    relatively comfortable and alert and responsive  HEENT: unremarkable  Neck no JVD, stridor  Cardiac regular, without m/r/g  Lungs are coarse bilaterally  Extrems are warm, without clubbing, cyanosis or edema  Neurological is grossly intact, nonfocal  ________________________________________________________  LABS:        x   |  x   |  x   ----------------------------<  x     09-23  x    |  x   |  0.56    PT/INR - ( 23 Sep 2022 12:21 )   PT: 13.9 sec;   INR: 1.20 ratio    __________________________________________________________________    IMPRESSION and RECOMMENDATIONS:    Elevate HOB  NIV for any clinical change  O2 to keep > 92%  Detailed recommendations as per Dr. Kennedy Marcano 9-23-22 note.  Please call for issues over the weekend.    Suman Love MD, FCCP, [facility]  Pulmonary and Sleep Medicine  [phone]

## 2022-09-24 NOTE — PROGRESS NOTE ADULT - SUBJECTIVE AND OBJECTIVE BOX
CARDIOLOGY     PROGRESS  NOTE   ________________________________________________    CHIEF COMPLAINT:Patient is a 87y old  Female who presents with a chief complaint of SOB (24 Sep 2022 10:34)  no complain.  	  REVIEW OF SYSTEMS:  CONSTITUTIONAL: No fever, weight loss, or fatigue  EYES: No eye pain, visual disturbances, or discharge  ENT:  No difficulty hearing, tinnitus, vertigo; No sinus or throat pain  NECK: No pain or stiffness  RESPIRATORY: No cough, wheezing, chills or hemoptysis; + Shortness of Breath  CARDIOVASCULAR: No chest pain, palpitations, passing out, dizziness, or leg swelling  GASTROINTESTINAL: No abdominal or epigastric pain. No nausea, vomiting, or hematemesis; No diarrhea or constipation. No melena or hematochezia.  GENITOURINARY: No dysuria, frequency, hematuria, or incontinence  NEUROLOGICAL: No headaches, memory loss, loss of strength, numbness, or tremors  SKIN: No itching, burning, rashes, or lesions   LYMPH Nodes: No enlarged glands  ENDOCRINE: No heat or cold intolerance; No hair loss  MUSCULOSKELETAL: No joint pain or swelling; No muscle, back, or extremity pain  PSYCHIATRIC: No depression, anxiety, mood swings, or difficulty sleeping  HEME/LYMPH: No easy bruising, or bleeding gums  ALLERGY AND IMMUNOLOGIC: No hives or eczema	    [ ] All others negative	  [ ] Unable to obtain    PHYSICAL EXAM:  T(C): 36.6 (09-24-22 @ 11:22), Max: 37 (09-24-22 @ 04:00)  HR: 116 (09-24-22 @ 11:22) (92 - 144)  BP: 111/76 (09-24-22 @ 11:22) (95/70 - 114/59)  RR: 23 (09-24-22 @ 11:22) (18 - 24)  SpO2: 96% (09-24-22 @ 11:22) (93% - 99%)  Wt(kg): --  I&O's Summary    23 Sep 2022 07:01  -  24 Sep 2022 07:00  --------------------------------------------------------  IN: 490 mL / OUT: 400 mL / NET: 90 mL        Appearance: Normal	  HEENT:   Normal oral mucosa, PERRL, EOMI	  Lymphatic: No lymphadenopathy  Cardiovascular: Normal S1 S2, No JVD, No murmurs, No edema  Respiratory: Lungs clear to auscultation	  Psychiatry: A & O x 3, Mood & affect appropriate  Gastrointestinal:  Soft, Non-tender, + BS	  Skin: No rashes, No ecchymoses, No cyanosis	  Neurologic: Non-focal  Extremities: Normal range of motion, No clubbing, cyanosis or edema  Vascular: Peripheral pulses palpable 2+ bilaterally    MEDICATIONS  (STANDING):  albuterol/ipratropium for Nebulization 3 milliLiter(s) Nebulizer <User Schedule>  allopurinol 100 milliGRAM(s) Oral daily  anastrozole 1 milliGRAM(s) Oral daily  atorvastatin 20 milliGRAM(s) Oral at bedtime  bisacodyl 5 milliGRAM(s) Oral at bedtime  buDESOnide    Inhalation Suspension 0.5 milliGRAM(s) Inhalation every 12 hours  chlorhexidine 2% Cloths 1 Application(s) Topical <User Schedule>  dextrose 50% Injectable 25 Gram(s) IV Push once  dextrose 50% Injectable 12.5 Gram(s) IV Push once  dextrose 50% Injectable 25 Gram(s) IV Push once  dextrose Oral Gel 15 Gram(s) Oral once  diltiazem    Tablet 30 milliGRAM(s) Enteral Tube every 6 hours  glucagon  Injectable 1 milliGRAM(s) IntraMuscular once  guaiFENesin Oral Liquid (Sugar-Free) 300 milliGRAM(s) Oral every 6 hours  insulin lispro (ADMELOG) corrective regimen sliding scale   SubCutaneous every 6 hours  insulin NPH human recombinant 6 Unit(s) SubCutaneous every 6 hours  levETIRAcetam  Solution 750 milliGRAM(s) Oral two times a day  levothyroxine 75 MICROGram(s) Oral daily  melatonin 3 milliGRAM(s) Oral at bedtime  methylPREDNISolone sodium succinate Injectable 20 milliGRAM(s) IV Push every 6 hours  rivaroxaban 10 milliGRAM(s) Oral daily  senna 2 Tablet(s) Oral at bedtime      TELEMETRY: 	    ECG:  	  RADIOLOGY:  OTHER: 	  	  LABS:	 	    CARDIAC MARKERS:                                8.7    8.80  )-----------( 233      ( 24 Sep 2022 11:34 )             29.4     09-24    142  |  109<H>  |  31<H>  ----------------------------<  142<H>  4.3   |  21<L>  |  0.59    Ca    8.8      24 Sep 2022 11:34    TPro  6.0  /  Alb  3.5  /  TBili  0.5  /  DBili  0.2  /  AST  14  /  ALT  16  /  AlkPhos  216<H>  09-24    proBNP: Serum Pro-Brain Natriuretic Peptide: 461 pg/mL (09-08 @ 20:56)  Serum Pro-Brain Natriuretic Peptide: 155 pg/mL (08-27 @ 21:06)    Lipid Profile:   HgA1c:   TSH:   PT/INR - ( 24 Sep 2022 11:34 )   PT: 11.3 sec;   INR: 0.98 ratio               Assessment and plan  ---------------------------  86F w/ extensive hx including severe tracheobronchomalacia (c/b hypoxia 2/2 mucous plugging and recurrent L lung collapse), 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 Very limited hx obtained from pt given mental status, dyspnea, and use of BIPAP. Unable to reach sonSy. History obtained from staff/chart review.    Per ED staff who saw pt when she first arrived, pt was notably dyspneic with increased WOB and wheezing. Supposedly missed use of nightly BIPAP at rehab. Reportedly denied fever and cough. On encounter for admission, pt with BIPAP mask on, appearing slightly lethargic and mildly dyspneic, but was able to answer some questions. Pt endorsed having SOB. Denied pain. Seemed to believe initially that she was at [facility]? but was difficult to understand her responses.    Of note, pt has multiple recent admissions with similar presentation. Most recently was hospitalized from 8/28/22-9/8/22 for SOB, treated with airway clearance and completed 5-day course of Zosyn for empiric coverage of PNA (though per Pulm, unlikely to have PNA). Course c/b PEG displacement and subsequent reinsertion by IR on 8/30. In addition to her usual PEG feeds, pt was also started on puree diet with moderately thickened fluids for pleasure feeds. Also was started on anastrozole for most likely dx of metastatic breast cancer to bones (pt was unable to tolerate further cancer workup of spine lesions). Pt remained full code during recent admissions.  pt with metastatic ca to the bone with sob sec to obesity and underlying lung and cardiac disease  increase sob sec to missing BiPAP at night  may consider pulmonary eval  may consider hospice care/ palliative care  continue Diltiazem  pt will need home o2 and Bipap at night  discussed with family at the bed side   dvt prophylaxis high risk on xarelto  pt with bone mets, no further nieto/ onc noted  on steroid dose has increased   a.fib on 09/23/increase Cardizem dose, will add digoxin  may increase Xarelto dose

## 2022-09-25 NOTE — PROGRESS NOTE ADULT - ASSESSMENT
89    year old female    h/o hemorrhagic CVA, has  PEG,  HTN, MI,   HLD, gout, right ca  breast   right Ca breast. s/p mastectomy in 2019,  s/p  sentinel node  localization.  4/4,  were  negative  s/p rrt  . in past,  for hypoxia. cxr with complete  collapsed  of  left lung, needing broch,   s p  bronchoscopy , pt  with  tracheomalacia  and  collapsed  airways,  makes  this a  difficult  situation, as there is no good rx for this     h/o bacteremia. on   pna, perforated  appendicitis,  ?  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 this will not alter rx. pt  redvd  extended  course  of ab   on iv  vanco and ertapenem.  till  2/9/.22.        *  admitted with   presumed  resp  failure/ pt was  sent back from [facility], the same day   pt seen in er.  appears   at her naseline.  no  wheezing /  atousable    ENT eval w/ laryngoscopy notable for vocal cords mobile and intact, no edema, upper airway patent     hypertonic saline for airway clearance   cxr, no pna     *   s/p cva, has  PEG,  npo  ,   on  synthroid, Keppra  ,  *   HTN, on  cardizem,  per  card dr jamison  *  h/o  Ca breast. /, s/p  R  mastectomy  *   obesity, bmi  was   41, now  is  30   *     c/c left  leg contracture ,  remains  bed  bound. functional  paraplegia,  needs  assistance  for  all her  adl's   *  pt  with  h/o  tracheomalacia  and  collapsed  airways,      given pt;s  mlple co morbidities,  propensity  for  lung collpse,   pt is  at risk for  re admissions     on nocturnal  bipap    difficult  situation, a s there  is no  good  rx  for  pt's  recurrent lung collapse hypoxia     h/o  of  chronic    mild  tachycardia          *   CT chest. lytic  lesions, T  spine/  ribs/ humerus/  oncoloigy  magnus pacheco.  suspect  metastatic ca breast        son. guicho Dan/ oncologist  has  also  discussed  with  son,  futility of  pursuing  bx. a s pt  is  not an ideal   candidate  for  chemo    CT  A.p ,/ prelim ,  pelvic  mets  per  oncology,   suspect  metastatic ca  breast,  was  awaiting   mri  spine/  pt unable  to tolerate  mri   per oncology, no  further  intervention/  and on  Arimidex, now,   per d r ohri   obesity,  bed bound.  functional paraplegia    per  son,   rehab promptly  returned   to er/ as they  did not  have  a  bipap machine /  care cortn to f/.p, needs  24/7  O2/ nocturnal bipap   difficult  situation,  bed  bound. obesity. c/c  hypercarbia, needing , prn /  nocturnal bipap/  with intermittent tachypnea    now  is  covid +. on iv steroid/ wheezing/ remdissivir/, seen by  house  ID     dvt ppx/  was  on  xarelto  for   30  days/  remains on iv steroid  pt  with  frequent episodes  of tachypnea. has  obstructive /restrictive   lung  disease ,  obesity/ RAQUEL/ hypercarbia   remains on iv  steroid,  difficult  situation   called son, mlple  times,  message  left/     pt  remains  at  her  baseline  minimally   verbal, on iv  steroids  Afib, on xarelto  20m mg qd/  Cardizem  now      per  son, pt is  full code

## 2022-09-25 NOTE — PROGRESS NOTE ADULT - ASSESSMENT
Assessment  DMT2: 87y Female with DM T2 with hyperglycemia, A1C 7%, now on NPH insulin and coverage, blood sugars fluctuating/overall running high, no hypoglycemias, on peg tube feeds, remains on same-dose IV steroids.  Covid: on medications, stable, monitored.  Hypothyroidism: On Synthroid 75 mcg po daily, ?compliance, euthyroid.  HLD: on statin.        Jeanie Gao MD  Cell: [phone]  Office: [phone]               Assessment  DMT2: 87y Female with DM T2 with hyperglycemia, A1C 7%, now on NPH insulin and coverage, blood sugars fluctuating/overall running high, no  hypoglycemias, on peg tube feeds, remains on same-dose IV steroids.  Covid: on medications, stable, monitored.  Hypothyroidism: On Synthroid 75 mcg po daily, ?compliance, euthyroid.  HLD: on statin.        Jeanie Gao MD  Cell: [phone]  Office: [phone]

## 2022-09-25 NOTE — PROGRESS NOTE ADULT - SUBJECTIVE AND OBJECTIVE BOX
Chief complaint  Patient is a 87y old  Female who presents with a chief complaint of SOB (25 Sep 2022 10:49)   Review of systems  Patient in bed, looks comfortable, no hypoglycemic episodes.    Labs and Fingersticks  CAPILLARY BLOOD GLUCOSE      POCT Blood Glucose.: 240 mg/dL (25 Sep 2022 12:34)  POCT Blood Glucose.: 137 mg/dL (25 Sep 2022 05:54)  POCT Blood Glucose.: 268 mg/dL (24 Sep 2022 23:52)  POCT Blood Glucose.: 217 mg/dL (24 Sep 2022 17:08)      Anion Gap, Serum: 11 (09-25 @ 13:34)  Anion Gap, Serum: 12 (09-24 @ 11:34)      Calcium, Total Serum: 9.0 (09-25 @ 13:34)  Calcium, Total Serum: 8.8 (09-24 @ 11:34)  Albumin, Serum: 3.7 (09-25 @ 13:34)  Albumin, Serum: 3.5 (09-24 @ 11:34)    Alanine Aminotransferase (ALT/SGPT): 18 (09-25 @ 13:34)  Alanine Aminotransferase (ALT/SGPT): 16 (09-24 @ 11:34)  Alkaline Phosphatase, Serum: 235 *H* (09-25 @ 13:34)  Alkaline Phosphatase, Serum: 216 *H* (09-24 @ 11:34)  Aspartate Aminotransferase (AST/SGOT): 20 (09-25 @ 13:34)  Aspartate Aminotransferase (AST/SGOT): 14 (09-24 @ 11:34)        09-25    143  |  109<H>  |  36<H>  ----------------------------<  272<H>  4.7   |  23  |  0.59    Ca    9.0      25 Sep 2022 13:34    TPro  6.5  /  Alb  3.7  /  TBili  0.4  /  DBili  0.1  /  AST  20  /  ALT  18  /  AlkPhos  235<H>  09-25                        10.1   11.40 )-----------( 228      ( 25 Sep 2022 13:34 )             34.3     Medications  MEDICATIONS  (STANDING):  albuterol/ipratropium for Nebulization 3 milliLiter(s) Nebulizer <User Schedule>  allopurinol 100 milliGRAM(s) Oral daily  anastrozole 1 milliGRAM(s) Oral daily  atorvastatin 20 milliGRAM(s) Oral at bedtime  bisacodyl 5 milliGRAM(s) Oral at bedtime  buDESOnide    Inhalation Suspension 0.5 milliGRAM(s) Inhalation every 12 hours  chlorhexidine 2% Cloths 1 Application(s) Topical <User Schedule>  dextrose 50% Injectable 25 Gram(s) IV Push once  dextrose 50% Injectable 12.5 Gram(s) IV Push once  dextrose 50% Injectable 25 Gram(s) IV Push once  dextrose Oral Gel 15 Gram(s) Oral once  diltiazem    Tablet 30 milliGRAM(s) Enteral Tube every 6 hours  glucagon  Injectable 1 milliGRAM(s) IntraMuscular once  guaiFENesin Oral Liquid (Sugar-Free) 300 milliGRAM(s) Oral every 6 hours  insulin lispro (ADMELOG) corrective regimen sliding scale   SubCutaneous every 6 hours  insulin NPH human recombinant 7 Unit(s) SubCutaneous every 6 hours  levETIRAcetam  Solution 750 milliGRAM(s) Oral two times a day  levothyroxine 75 MICROGram(s) Oral daily  melatonin 3 milliGRAM(s) Oral at bedtime  methylPREDNISolone sodium succinate Injectable 20 milliGRAM(s) IV Push every 6 hours  rivaroxaban 20 milliGRAM(s) Oral with dinner  senna 2 Tablet(s) Oral at bedtime      Physical Exam  General: Patient comfortable in bed  Vital Signs Last 12 Hrs  T(F): 98.9 (09-25-22 @ 06:01), Max: 98.9 (09-25-22 @ 06:01)  HR: 102 (09-25-22 @ 11:42) (100 - 108)  BP: 101/75 (09-25-22 @ 06:01) (101/75 - 101/75)  BP(mean): --  RR: 23 (09-25-22 @ 11:42) (20 - 23)  SpO2: 97% (09-25-22 @ 11:42) (97% - 99%)  Neck: No palpable thyroid nodules.  CVS: S1S2, No murmurs  Respiratory: No wheezing, no crepitations  GI: Abdomen soft, bowel sounds positive  Musculoskeletal:  edema lower extremities.   Skin: No skin rashes, no ecchymosis    Diagnostics             Chief complaint  Patient is a 87y old  Female who presents with a chief complaint of SOB (25 Sep 2022 10:49)   Review of systems  Patient in bed, looks comfortable, no hypoglycemic episodes.    Labs and Fingersticks  CAPILLARY BLOOD GLUCOSE      POCT Blood Glucose.: 240 mg/dL (25 Sep 2022 12:34)  POCT Blood Glucose.: 137 mg/dL (25 Sep 2022 05:54)  POCT Blood Glucose.: 268 mg/dL (24 Sep 2022 23:52)  POCT Blood Glucose.: 217 mg/dL (24 Sep 2022 17:08)      Anion Gap, Serum: 11 (09-25 @ 13:34)  Anion Gap, Serum: 12 (09-24 @ 11:34)      Calcium, Total Serum: 9.0 (09-25 @ 13:34)  Calcium, Total Serum: 8.8 (09-24 @ 11:34)  Albumin, Serum: 3.7 (09-25 @ 13:34)  Albumin, Serum: 3.5 (09-24 @ 11:34)    Alanine Aminotransferase (ALT/SGPT): 18 (09-25 @ 13:34)  Alanine Aminotransferase (ALT/SGPT): 16 (09-24 @ 11:34)  Alkaline Phosphatase, Serum: 235 *H* (09-25 @ 13:34)  Alkaline Phosphatase, Serum: 216 *H* (09-24 @ 11:34)  Aspartate Aminotransferase (AST/SGOT): 20 (09-25 @ 13:34)  Aspartate Aminotransferase (AST/SGOT): 14 (09-24 @ 11:34)        09-25    143  |  109<H>  |  36<H>  ----------------------------<  272<H>  4.7   |  23  |  0.59    Ca    9.0      25 Sep 2022 13:34    TPro  6.5  /  Alb  3.7  /  TBili  0.4  /  DBili  0.1  /  AST  20  /  ALT  18  /  AlkPhos  235<H>  09-25                        10.1   11.40 )-----------( 228      ( 25 Sep 2022 13:34 )             34.3     Medications  MEDICATIONS  (STANDING):  albuterol/ipratropium for Nebulization 3 milliLiter(s) Nebulizer <User Schedule>  allopurinol 100 milliGRAM(s) Oral daily  anastrozole 1 milliGRAM(s) Oral daily  atorvastatin 20 milliGRAM(s) Oral at bedtime  bisacodyl 5 milliGRAM(s) Oral at bedtime  buDESOnide    Inhalation Suspension 0.5 milliGRAM(s) Inhalation every 12 hours  chlorhexidine 2% Cloths 1 Application(s) Topical <User Schedule>  dextrose 50% Injectable 25 Gram(s) IV Push once  dextrose 50% Injectable 12.5 Gram(s) IV Push once  dextrose 50% Injectable 25 Gram(s) IV Push once  dextrose Oral Gel 15 Gram(s) Oral once  diltiazem    Tablet 30 milliGRAM(s) Enteral Tube every 6 hours  glucagon  Injectable 1 milliGRAM(s) IntraMuscular once  guaiFENesin Oral Liquid (Sugar-Free) 300 milliGRAM(s) Oral every 6 hours  insulin lispro (ADMELOG) corrective regimen sliding scale   SubCutaneous every 6 hours  insulin NPH human recombinant 7 Unit(s) SubCutaneous every 6 hours  levETIRAcetam  Solution 750 milliGRAM(s) Oral two times a day  levothyroxine 75 MICROGram(s) Oral daily  melatonin 3 milliGRAM(s) Oral at bedtime  methylPREDNISolone sodium succinate Injectable 20 milliGRAM(s) IV Push every 6 hours  rivaroxaban 20 milliGRAM(s) Oral with dinner  senna 2 Tablet(s) Oral at bedtime      Physical Exam  General: Patient comfortable in bed  Vital Signs Last 12 Hrs  T(F): 98.9 (09-25-22 @ 06:01), Max: 98.9 (09-25-22 @ 06:01)  HR: 102 (09-25-22 @ 11:42) (100 - 108)  BP: 101/75 (09-25-22 @ 06:01) (101/75 - 101/75)  BP(mean): --  RR: 23 (09-25-22 @ 11:42) (20 - 23)  SpO2: 97% (09-25-22 @ 11:42) (97% - 99%)  Neck: No palpable thyroid nodules.  CVS: S1S2, No murmurs  Respiratory: No wheezing, no crepitations  GI: Abdomen soft, bowel sounds positive  Musculoskeletal:  edema lower extremities.   Skin: No skin rashes, no ecchymosis    Diagnostics

## 2022-09-25 NOTE — PROGRESS NOTE ADULT - SUBJECTIVE AND OBJECTIVE BOX
afberile  REVIEW OF SYSTEMS:  GEN: no fever,    no chills  RESP: no SOB,   no cough  CVS: no chest pain,   no palpitations  GI: no abdominal pain,   no nausea,   no vomiting,   no constipation,   no diarrhea  : no dysuria,   no frequency  NEURO: no headache,   no dizziness  PSYCH: no depression,   not anxious  Derm : no rash    MEDICATIONS  (STANDING):  albuterol/ipratropium for Nebulization 3 milliLiter(s) Nebulizer <User Schedule>  allopurinol 100 milliGRAM(s) Oral daily  anastrozole 1 milliGRAM(s) Oral daily  atorvastatin 20 milliGRAM(s) Oral at bedtime  bisacodyl 5 milliGRAM(s) Oral at bedtime  buDESOnide    Inhalation Suspension 0.5 milliGRAM(s) Inhalation every 12 hours  chlorhexidine 2% Cloths 1 Application(s) Topical <User Schedule>  dextrose 50% Injectable 25 Gram(s) IV Push once  dextrose 50% Injectable 12.5 Gram(s) IV Push once  dextrose 50% Injectable 25 Gram(s) IV Push once  dextrose Oral Gel 15 Gram(s) Oral once  diltiazem    Tablet 30 milliGRAM(s) Enteral Tube every 6 hours  glucagon  Injectable 1 milliGRAM(s) IntraMuscular once  guaiFENesin Oral Liquid (Sugar-Free) 300 milliGRAM(s) Oral every 6 hours  insulin lispro (ADMELOG) corrective regimen sliding scale   SubCutaneous every 6 hours  insulin NPH human recombinant 6 Unit(s) SubCutaneous every 6 hours  levETIRAcetam  Solution 750 milliGRAM(s) Oral two times a day  levothyroxine 75 MICROGram(s) Oral daily  melatonin 3 milliGRAM(s) Oral at bedtime  methylPREDNISolone sodium succinate Injectable 20 milliGRAM(s) IV Push every 6 hours  rivaroxaban 20 milliGRAM(s) Oral with dinner  senna 2 Tablet(s) Oral at bedtime    MEDICATIONS  (PRN):  acetaminophen     Tablet .. 650 milliGRAM(s) Oral every 6 hours PRN Temp greater or equal to 38C (100.4F), Mild Pain (1 - 3)  polyethylene glycol 3350 17 Gram(s) Oral daily PRN Constipation      Vital Signs Last 24 Hrs  T(C): 37.2 (25 Sep 2022 06:01), Max: 37.2 (25 Sep 2022 06:01)  T(F): 98.9 (25 Sep 2022 06:01), Max: 98.9 (25 Sep 2022 06:01)  HR: 101 (25 Sep 2022 06:01) (92 - 124)  BP: 101/75 (25 Sep 2022 06:01) (101/75 - 132/70)  BP(mean): --  RR: 20 (25 Sep 2022 06:01) (20 - 23)  SpO2: 99% (25 Sep 2022 06:01) (94% - 99%)    Parameters below as of 25 Sep 2022 06:01  Patient On (Oxygen Delivery Method): BiPAP/CPAP      CAPILLARY BLOOD GLUCOSE      POCT Blood Glucose.: 137 mg/dL (25 Sep 2022 05:54)  POCT Blood Glucose.: 268 mg/dL (24 Sep 2022 23:52)  POCT Blood Glucose.: 217 mg/dL (24 Sep 2022 17:08)  POCT Blood Glucose.: 167 mg/dL (24 Sep 2022 12:07)    I&O's Summary    23 Sep 2022 07:01  -  24 Sep 2022 07:00  --------------------------------------------------------  IN: 490 mL / OUT: 400 mL / NET: 90 mL    24 Sep 2022 07:01  -  25 Sep 2022 06:57  --------------------------------------------------------  IN: 0 mL / OUT: 475 mL / NET: -475 mL        PHYSICAL EXAM:  HEAD:  Atraumatic, Normocephalic  NECK: Supple, No   JVD  CHEST/LUNG:   no     rales,     no,    rhonchi  HEART: Regular rate and rhythm;         murmur  ABDOMEN: Soft, Nontender, ;   EXTREMITIES:        edema  NEUROLOGY:    lerhargic    LABS:                        8.7    8.80  )-----------( 233      ( 24 Sep 2022 11:34 )             29.4     09-24    142  |  109<H>  |  31<H>  ----------------------------<  142<H>  4.3   |  21<L>  |  0.59    Ca    8.8      24 Sep 2022 11:34    TPro  6.0  /  Alb  3.5  /  TBili  0.5  /  DBili  0.2  /  AST  14  /  ALT  16  /  AlkPhos  216<H>  09-24    PT/INR - ( 24 Sep 2022 11:34 )   PT: 11.3 sec;   INR: 0.98 ratio                     09-23 @ 10:41  Performed In Lab  --      Thyroid Stimulating Hormone, Serum: 0.32 uIU/mL (09-24 @ 11:34)          Consultant(s) Notes Reviewed:      Care Discussed with Consultants/Other Providers:

## 2022-09-25 NOTE — PROGRESS NOTE ADULT - SUBJECTIVE AND OBJECTIVE BOX
CARDIOLOGY     PROGRESS  NOTE   ________________________________________________    CHIEF COMPLAINT:Patient is a 87y old  Female who presents with a chief complaint of SOB (25 Sep 2022 09:47)  still with sob.  	  REVIEW OF SYSTEMS:  CONSTITUTIONAL: No fever, weight loss, or fatigue  EYES: No eye pain, visual disturbances, or discharge  ENT:  No difficulty hearing, tinnitus, vertigo; No sinus or throat pain  NECK: No pain or stiffness  RESPIRATORY: No cough, wheezing, chills or hemoptysis; + Shortness of Breath  CARDIOVASCULAR: No chest pain, palpitations, passing out, dizziness, or leg swelling  GASTROINTESTINAL: No abdominal or epigastric pain. No nausea, vomiting, or hematemesis; No diarrhea or constipation. No melena or hematochezia.  GENITOURINARY: No dysuria, frequency, hematuria, or incontinence  NEUROLOGICAL: No headaches, memory loss, loss of strength, numbness, or tremors  SKIN: No itching, burning, rashes, or lesions   LYMPH Nodes: No enlarged glands  ENDOCRINE: No heat or cold intolerance; No hair loss  MUSCULOSKELETAL: No joint pain or swelling; No muscle, back, or extremity pain  PSYCHIATRIC: No depression, anxiety, mood swings, or difficulty sleeping  HEME/LYMPH: No easy bruising, or bleeding gums  ALLERGY AND IMMUNOLOGIC: No hives or eczema	    [ ] All others negative	  [ x] Unable to obtain    PHYSICAL EXAM:  T(C): 37.2 (09-25-22 @ 06:01), Max: 37.2 (09-25-22 @ 06:01)  HR: 106 (09-25-22 @ 09:56) (100 - 124)  BP: 101/75 (09-25-22 @ 06:01) (101/75 - 132/70)  RR: 22 (09-25-22 @ 09:55) (20 - 23)  SpO2: 97% (09-25-22 @ 09:56) (94% - 99%)  Wt(kg): --  I&O's Summary    24 Sep 2022 07:01  -  25 Sep 2022 07:00  --------------------------------------------------------  IN: 0 mL / OUT: 475 mL / NET: -475 mL    25 Sep 2022 07:01  -  25 Sep 2022 10:49  --------------------------------------------------------  IN: 0 mL / OUT: 200 mL / NET: -200 mL        Appearance: Normal	  HEENT:   Normal oral mucosa, PERRL, EOMI	  Lymphatic: No lymphadenopathy  Cardiovascular: Normal S1 S2, No JVD, + murmurs, No edema  Respiratory: rhonchi  Psychiatry: A & O x 3, Mood & affect appropriate  Gastrointestinal:  Soft, Non-tender, + BS	  Skin: No rashes, No ecchymoses, No cyanosis	  Neurologic: Non-focal  Extremities: Normal range of motion, No clubbing, cyanosis or edema  Vascular: Peripheral pulses palpable 2+ bilaterally    MEDICATIONS  (STANDING):  albuterol/ipratropium for Nebulization 3 milliLiter(s) Nebulizer <User Schedule>  allopurinol 100 milliGRAM(s) Oral daily  anastrozole 1 milliGRAM(s) Oral daily  atorvastatin 20 milliGRAM(s) Oral at bedtime  bisacodyl 5 milliGRAM(s) Oral at bedtime  buDESOnide    Inhalation Suspension 0.5 milliGRAM(s) Inhalation every 12 hours  chlorhexidine 2% Cloths 1 Application(s) Topical <User Schedule>  dextrose 50% Injectable 25 Gram(s) IV Push once  dextrose 50% Injectable 12.5 Gram(s) IV Push once  dextrose 50% Injectable 25 Gram(s) IV Push once  dextrose Oral Gel 15 Gram(s) Oral once  diltiazem    Tablet 30 milliGRAM(s) Enteral Tube every 6 hours  glucagon  Injectable 1 milliGRAM(s) IntraMuscular once  guaiFENesin Oral Liquid (Sugar-Free) 300 milliGRAM(s) Oral every 6 hours  insulin lispro (ADMELOG) corrective regimen sliding scale   SubCutaneous every 6 hours  insulin NPH human recombinant 6 Unit(s) SubCutaneous every 6 hours  levETIRAcetam  Solution 750 milliGRAM(s) Oral two times a day  levothyroxine 75 MICROGram(s) Oral daily  melatonin 3 milliGRAM(s) Oral at bedtime  methylPREDNISolone sodium succinate Injectable 20 milliGRAM(s) IV Push every 6 hours  rivaroxaban 20 milliGRAM(s) Oral with dinner  senna 2 Tablet(s) Oral at bedtime      TELEMETRY: 	    ECG:  	  RADIOLOGY:  OTHER: 	  	  LABS:	 	    CARDIAC MARKERS:                                8.7    8.80  )-----------( 233      ( 24 Sep 2022 11:34 )             29.4     09-24    142  |  109<H>  |  31<H>  ----------------------------<  142<H>  4.3   |  21<L>  |  0.59    Ca    8.8      24 Sep 2022 11:34    TPro  6.0  /  Alb  3.5  /  TBili  0.5  /  DBili  0.2  /  AST  14  /  ALT  16  /  AlkPhos  216<H>  09-24    proBNP: Serum Pro-Brain Natriuretic Peptide: 461 pg/mL (09-08 @ 20:56)  Serum Pro-Brain Natriuretic Peptide: 155 pg/mL (08-27 @ 21:06)    Lipid Profile:   HgA1c:   TSH: Thyroid Stimulating Hormone, Serum: 0.32 uIU/mL (09-24 @ 11:34)  Thyroid Stimulating Hormone, Serum: 0.30 uIU/mL (09-24 @ 11:34)    PT/INR - ( 24 Sep 2022 11:34 )   PT: 11.3 sec;   INR: 0.98 ratio               Assessment and plan  ---------------------------  86F w/ extensive hx including severe tracheobronchomalacia (c/b hypoxia 2/2 mucous plugging and recurrent L lung collapse), 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 Very limited hx obtained from pt given mental status, dyspnea, and use of BIPAP. Unable to reach sonSy. History obtained from staff/chart review.    Per ED staff who saw pt when she first arrived, pt was notably dyspneic with increased WOB and wheezing. Supposedly missed use of nightly BIPAP at rehab. Reportedly denied fever and cough. On encounter for admission, pt with BIPAP mask on, appearing slightly lethargic and mildly dyspneic, but was able to answer some questions. Pt endorsed having SOB. Denied pain. Seemed to believe initially that she was at [facility] rehab? but was difficult to understand her responses.    Of note, pt has multiple recent admissions with similar presentation. Most recently was hospitalized from 8/28/22-9/8/22 for SOB, treated with airway clearance and completed 5-day course of Zosyn for empiric coverage of PNA (though per Pulm, unlikely to have PNA). Course c/b PEG displacement and subsequent reinsertion by IR on 8/30. In addition to her usual PEG feeds, pt was also started on puree diet with moderately thickened fluids for pleasure feeds. Also was started on anastrozole for most likely dx of metastatic breast cancer to bones (pt was unable to tolerate further cancer workup of spine lesions). Pt remained full code during recent admissions.  pt with metastatic ca to the bone with sob sec to obesity and underlying lung and cardiac disease  increase sob sec to missing BiPAP at night  may consider pulmonary eval  may consider hospice care/ palliative care  continue Diltiazem  pt will need home o2 and Bipap at night  discussed with family at the bed side   dvt prophylaxis high risk on xarelto  pt with bone mets, no further nieto/ onc noted  on steroid dose has increased   a.fib on 09/23/increase Cardizem dose, will add digoxin  may increase Xarelto dose sec to a.fib  add digoxin

## 2022-09-25 NOTE — PROGRESS NOTE ADULT - SUBJECTIVE AND OBJECTIVE BOX
FRANKI MEHTA        [id number]  22 @ 09:49  ------------------------------------------------------------------------------------  [facility]     PROGRESS NOTE    Resting comfortably and breathing easily on NIV/AVAPS mode, 30%  Arousable and no specific complaints.  -----------------------------------------------------------------------------------  REVIEW OF SYSTEMS: (except as described above)  Constitutional: As per interval history  HEENT: Within normal limits  CV: As per interval history  Resp: As per interval history  GI: Within normal limits   : Within normal limits  Musculoskeletal: Within normal limits  Skin: Within normal limits  Neurological: Within normal limits  Psychiatric: Within normal limits  Endocrine: Within normal limits  Hematologic/Lymphatic: Within normal limits  Allergic/Immunologic: Within normal limits    [ ] Unable to assess ROS because   -------------------------------------------------------------------------------------  PAST MEDICAL & SURGICAL HISTORY:  Shortness of breath    No pertinent family history in first degree relatives    Handoff    MEWS Score    MI (myocardial infarction)    HTN (hypertension)    Personal history of asbestosis    Gout    Dyslipidemia    UTI (lower urinary tract infection)    ETOH abuse    CVA (cerebral vascular accident)    Tracheobronchomalacia    HTN (hypertension)    HLD (hyperlipidemia)    Shortness of breath    Shortness of breath    Tracheobronchomalacia    CVA (cerebral vascular accident)    CAD (coronary artery disease)    Gout    HTN (hypertension)    HLD (hyperlipidemia)    Leukocytosis    Metastatic breast cancer    Prophylactic measure    Moderate aortic stenosis    Hypothyroid    DM (diabetes mellitus)    History of left shoulder fracture    History of benign breast tumor    RESP DISTRESS    0    [email or web address]      --------------------------------------------------------------------------------------    MEDICATIONS:     Pulmonary "  albuterol/ipratropium for Nebulization 3 milliLiter(s) Nebulizer <User Schedule>  buDESOnide    Inhalation Suspension 0.5 milliGRAM(s) Inhalation every 12 hours  guaiFENesin Oral Liquid (Sugar-Free) 300 milliGRAM(s) Oral every 6 hours      Anti-microbials:      Cardiovascular:  diltiazem    Tablet 30 milliGRAM(s) Enteral Tube every 6 hours      Other:  acetaminophen     Tablet .. 650 milliGRAM(s) Oral every 6 hours PRN  allopurinol 100 milliGRAM(s) Oral daily  anastrozole 1 milliGRAM(s) Oral daily  atorvastatin 20 milliGRAM(s) Oral at bedtime  bisacodyl 5 milliGRAM(s) Oral at bedtime  chlorhexidine 2% Cloths 1 Application(s) Topical <User Schedule>  dextrose 50% Injectable 25 Gram(s) IV Push once  dextrose 50% Injectable 12.5 Gram(s) IV Push once  dextrose 50% Injectable 25 Gram(s) IV Push once  dextrose Oral Gel 15 Gram(s) Oral once  glucagon  Injectable 1 milliGRAM(s) IntraMuscular once  insulin lispro (ADMELOG) corrective regimen sliding scale   SubCutaneous every 6 hours  insulin NPH human recombinant 6 Unit(s) SubCutaneous every 6 hours  levETIRAcetam  Solution 750 milliGRAM(s) Oral two times a day  levothyroxine 75 MICROGram(s) Oral daily  melatonin 3 milliGRAM(s) Oral at bedtime  methylPREDNISolone sodium succinate Injectable 20 milliGRAM(s) IV Push every 6 hours  polyethylene glycol 3350 17 Gram(s) Oral daily PRN  rivaroxaban 20 milliGRAM(s) Oral with dinner  senna 2 Tablet(s) Oral at bedtime    --------------------------------------------------------------------------------------    OBJECTIVE:        I&O's Detail    24 Sep 2022 07:01  -  25 Sep 2022 07:00  --------------------------------------------------------  IN:  Total IN: 0 mL    OUT:    Voided (mL): 475 mL  Total OUT: 475 mL    Total NET: -475 mL      25 Sep 2022 07:01  -  25 Sep 2022 09:49  --------------------------------------------------------  IN:  Total IN: 0 mL    OUT:    Voided (mL): 200 mL  Total OUT: 200 mL    Total NET: -200 mL          Daily     Daily Weight in k.5 (25 Sep 2022 08:30)    CAPILLARY BLOOD GLUCOSE      POCT Blood Glucose.: 137 mg/dL (25 Sep 2022 05:54)  POCT Blood Glucose.: 268 mg/dL (24 Sep 2022 23:52)  POCT Blood Glucose.: 217 mg/dL (24 Sep 2022 17:08)  POCT Blood Glucose.: 167 mg/dL (24 Sep 2022 12:07)    ------------------------------------------------------------------------------------------  PHYSICAL EXAM:       ICU Vital Signs Last 24 Hrs  T(C): 37.2 (25 Sep 2022 06:01), Max: 37.2 (25 Sep 2022 06:01)  T(F): 98.9 (25 Sep 2022 06:01), Max: 98.9 (25 Sep 2022 06:01)  HR: 101 (25 Sep 2022 06:01) (92 - 124)  BP: 101/75 (25 Sep 2022 06:01) (101/75 - 132/70)  BP(mean): --  ABP: --  ABP(mean): --  RR: 20 (25 Sep 2022 06:01) (20 - 23)  SpO2: 99% (25 Sep 2022 06:01) (94% - 99%)    O2 Parameters below as of 25 Sep 2022 06:01  Patient On (Oxygen Delivery Method): BiPAP/CPAP      relatively comfortable, NAD  Arousable  Neck no JVD, stridor  Cardiac regular, without m/r/g  Lungs coarse bilaterally  Abdomen is soft and nontender, nondistended  Extrems are warm, without clubbing, cyanosis or edema  Neurological is arousable    on NIV, AVAPS 450 cc, 30%  TV approx 420, MV 8.7, RR 17  ________________________________________________________  LABS:                        8.7    8.80  )-----------( 233      ( 24 Sep 2022 11:34 )             29.4       142  |  109<H>  |  31<H>  ----------------------------<  142<H>    09-24  4.3   |  21<L>  |  0.59    PT/INR - ( 24 Sep 2022 11:34 )   PT: 11.3 sec;   INR: 0.98 ratio      MICROBIOLOGY:     RADIOLOGY:  __________________________________________________________________    IMPRESSION and RECOMMENDATIONS:    Complex clinical scenario but relatively comfortable on NIV at the current settings.  Elevate HOB  Continue current course as outlined in Dr. Kennedy Marcano detailed 22 note.  Please call for issues through Tuesday.    Suman Love MD, FCCP, FAASM  Pulmonary and Sleep Medicine  [facility] [place]  [phone]

## 2022-09-26 NOTE — PROGRESS NOTE ADULT - SUBJECTIVE AND OBJECTIVE BOX
Chief complaint    Patient is a 87y old  Female who presents with a chief complaint of SOB (26 Sep 2022 10:53)   Review of systems  Patient in bed, appears comfortable.    Labs and Fingersticks  CAPILLARY BLOOD GLUCOSE      POCT Blood Glucose.: 233 mg/dL (26 Sep 2022 12:09)  POCT Blood Glucose.: 149 mg/dL (26 Sep 2022 08:08)  POCT Blood Glucose.: 122 mg/dL (26 Sep 2022 05:33)  POCT Blood Glucose.: 286 mg/dL (25 Sep 2022 23:53)  POCT Blood Glucose.: 213 mg/dL (25 Sep 2022 16:39)      Anion Gap, Serum: 11 (09-25 @ 13:34)      Calcium, Total Serum: 9.0 (09-25 @ 13:34)  Albumin, Serum: 3.7 (09-25 @ 13:34)    Alanine Aminotransferase (ALT/SGPT): 18 (09-25 @ 13:34)  Alkaline Phosphatase, Serum: 235 *H* (09-25 @ 13:34)  Aspartate Aminotransferase (AST/SGOT): 20 (09-25 @ 13:34)        09-25    143  |  109<H>  |  36<H>  ----------------------------<  272<H>  4.7   |  23  |  0.59    Ca    9.0      25 Sep 2022 13:34    TPro  6.5  /  Alb  3.7  /  TBili  0.4  /  DBili  0.1  /  AST  20  /  ALT  18  /  AlkPhos  235<H>  09-25                        10.1   11.40 )-----------( 228      ( 25 Sep 2022 13:34 )             34.3     Medications  MEDICATIONS  (STANDING):  albuterol/ipratropium for Nebulization 3 milliLiter(s) Nebulizer every 6 hours  allopurinol 100 milliGRAM(s) Oral daily  anastrozole 1 milliGRAM(s) Oral daily  atorvastatin 20 milliGRAM(s) Oral at bedtime  bisacodyl 5 milliGRAM(s) Oral at bedtime  buDESOnide    Inhalation Suspension 0.5 milliGRAM(s) Inhalation every 12 hours  chlorhexidine 2% Cloths 1 Application(s) Topical <User Schedule>  dextrose 50% Injectable 25 Gram(s) IV Push once  dextrose 50% Injectable 12.5 Gram(s) IV Push once  dextrose 50% Injectable 25 Gram(s) IV Push once  dextrose Oral Gel 15 Gram(s) Oral once  digoxin  Injectable 125 MICROGram(s) IV Push daily  diltiazem    Tablet 30 milliGRAM(s) Enteral Tube every 6 hours  glucagon  Injectable 1 milliGRAM(s) IntraMuscular once  guaiFENesin Oral Liquid (Sugar-Free) 300 milliGRAM(s) Oral every 6 hours  insulin lispro (ADMELOG) corrective regimen sliding scale   SubCutaneous every 6 hours  insulin NPH human recombinant 7 Unit(s) SubCutaneous every 6 hours  levETIRAcetam  Solution 750 milliGRAM(s) Oral two times a day  levothyroxine 75 MICROGram(s) Oral daily  melatonin 3 milliGRAM(s) Oral at bedtime  methylPREDNISolone sodium succinate Injectable 20 milliGRAM(s) IV Push every 8 hours  rivaroxaban 20 milliGRAM(s) Oral with dinner  senna 2 Tablet(s) Oral at bedtime      Physical Exam  General: Patient comfortable in bed  Vital Signs Last 12 Hrs  T(F): 98.4 (09-26-22 @ 11:03), Max: 98.4 (09-26-22 @ 11:03)  HR: 98 (09-26-22 @ 13:02) (98 - 115)  BP: 134/68 (09-26-22 @ 13:02) (98/55 - 134/68)  BP(mean): --  RR: 20 (09-26-22 @ 11:03) (20 - 20)  SpO2: 98% (09-26-22 @ 11:03) (94% - 100%)  Neck: No palpable thyroid nodules.  CVS: S1S2, No murmurs  Respiratory: No wheezing, no crepitations  GI: Abdomen soft, bowel sounds positive  Musculoskeletal:  edema lower extremities.     Diagnostics    Free Thyroxine, Serum: AM Sched. Collection: 24-Sep-2022 06:00  Cancel Reason: Dup Cancel (09-23 @ 12:07)  Thyroid Stimulating Hormone, Serum: AM Sched. Collection: 24-Sep-2022 06:00 (09-23 @ 12:07)  A1C with Estimated Average Glucose: Routine (09-23 @ 12:06)  A1C with Estimated Average Glucose: Routine  Cancel Reason: Lab Operations Cancel (09-23 @ 11:58)  A1C with Estimated Average Glucose: Routine (09-23 @ 10:31)  Free Thyroxine, Serum: AM Sched. Collection: 24-Sep-2022 06:00 (09-23 @ 09:23)  Thyroid Stimulating Hormone, Serum: AM Sched. Collection: 24-Sep-2022 06:00 (09-23 @ 09:23)

## 2022-09-26 NOTE — PROGRESS NOTE ADULT - ASSESSMENT
89    year old female    h/o hemorrhagic CVA, has  PEG,  HTN, MI,   HLD, gout, right ca  breast   right Ca breast. s/p mastectomy in 2019,  s/p  sentinel node  localization.  4/4,  were  negative  s/p rrt  . in past,  for hypoxia. cxr with complete  collapsed  of  left lung, needing broch,   s p  bronchoscopy , pt  with  tracheomalacia  and  collapsed  airways,  makes  this a  difficult  situation, as there is no good rx for this     h/o bacteremia. on   pna, perforated  appendicitis,  ?  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 this will not alter rx. pt  redvd  extended  course  of ab   on iv  vanco and ertapenem.  till  2/9/.22.        *  admitted with   presumed  resp  failure/ pt was  sent back from [facility], the same day   pt seen in er.  appears   at her naseline.  no  wheezing /  atousable    ENT eval w/ laryngoscopy notable for vocal cords mobile and intact, no edema, upper airway patent     hypertonic saline for airway clearance   cxr, no pna     *   s/p cva, has  PEG,  npo  ,   on  synthroid, Keppra  ,  *   HTN, on  cardizem,  per  card dr jamison  *  h/o  Ca breast. /, s/p  R  mastectomy  *   obesity, bmi  was   41, now  is  30   *     c/c left  leg contracture ,  remains  bed  bound. functional  paraplegia,  needs  assistance  for  all her  adl's   *  pt  with  h/o  tracheomalacia  and  collapsed  airways,      given pt;s  mlple co morbidities,  propensity  for  lung collpse,   pt is  at risk for  re admissions     on nocturnal  bipap    difficult  situation, a s there  is no  good  rx  for  pt's  recurrent lung collapse hypoxia     h/o  of  chronic    mild  tachycardia          *   CT chest. lytic  lesions, T  spine/  ribs/ humerus/  oncoloigy  magnus pacheco.  suspect  metastatic ca breast        son. guicho Dan/ oncologist  has  also  discussed  with  son,  futility of  pursuing  bx. a s pt  is  not an ideal   candidate  for  chemo    CT  A.p ,/ prelim ,  pelvic  mets  per  oncology,   suspect  metastatic ca  breast,  was  awaiting   mri  spine/  pt unable  to tolerate  mri   per oncology, no  further  intervention/  and on  Arimidex, now,   per d r ohri   obesity,  bed bound.  functional paraplegia    per  son,   rehab promptly  returned   to er/ as they  did not  have  a  bipap machine /  care cortn to f/.p, needs  24/7  O2/ nocturnal bipap   difficult  situation,  bed  bound. obesity. c/c  hypercarbia, needing , prn /  nocturnal bipap/  with intermittent tachypnea    now  is  covid +. on iv steroid/ was  wheezing/ remdissivir/, seen by  house  ID   pt  with  frequent episodes  of tachypnea. has  obstructive /restrictive   lung  disease ,  obesity/ RAQUEL/ hypercarbia   called son, mlple  times,  message  left/    pt  remains  at  her  baseline  minimally   verbal  Afib, on xarelto  20m mg qd  persistent tachycardia, on   cardizem  and digoxin/  remains on iv steroid  pe r pulm       per  son, pt is  full code

## 2022-09-26 NOTE — PROGRESS NOTE ADULT - ASSESSMENT
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 She also has a history of HTN, HLD, CAD s/p MI with preserved LVEF and moderate aortic stenosis. The patient was admitted to [facility] in December 2021 with fever and abdominal pain due to perforated appendicitis. She was treated with tube drainage and antibiotics for a polymicrobial infection. Hospital course was complicated by respiratory failure due to mucous plugging with complete collapse of the left lung. She underwent several bronchoscopies for pulmonary toilet and was found to have severe tracheobronchomalacia. Her respiratory status has remained "stable" while at [facility] on nebulized bronchodilators and hypertonic saline and without nocturnal NIV. She was admitted in March with hematochezia. The left lower lobe was well aerated on a CT scan of the abdomen and pelvis with only bibasilar subsegmental atelectasis - there was scattered sigmoid diverticulosis without evidence of diverticulitis - interval decrease in size of a fluid collection in the region of the previously perforated appendicitis measuring 2.8 x 1.5 cm previously measuring 4.0 x 2.2 cm - slight increase in mild adjacent inflammatory change. The GI bleed was treated conservatively.  She was admitted on 8/29 with shortness of breath in the setting of back, neck and left lower extremity pain. She required NIV for mild acute hypercapnic respiratory failure that quickly resolved. Her respiratory status remained stable on room air and on her usual nebs and mucolytics. She was started on a puree diet with moderately thickened fluids in addition to PEG feeds following evaluation by the speech and swallow team. She was started on anastrazole for likely metastatic breast cancer to the bone. On the day of discharge, the patient was quite anxious with shortness of breath, chest congestion and wheeze - she was making a grunting noise with expiration. She was returned from [facility] that evening. Currently she is tachypneic and using accessory muscles of respiration. She has an audible wheeze and cough productive of scant sputum. No fevers, chills or sweats. No chest pain/pressure or palpitations.     the patient has new onset bronchospasm with increased work of breathing and worsening left lower lobe atelectasis -  she has severe tracheobronchomalacia that has resulted in mucous plugging with complete left lung collapse requiring several bronchoscopies for pulmonary toilet in the past - the patient was felt not to be a candidate for surgery for the tracheobronchomalacia and stenting was felt to have more risk than benefit     metastatic breast cancer    9/19 -  found to be COVID positive on discharge [facility]; awake and alert; now with marked worsening of shortness of breath and using her accessory muscles of respiration; severe chest congestion and wheeze exacerbated by neck flexion; increasing pCO2, WBC and lactate; occasional cough productive of scant sputum; no fevers, chills or sweats; no chest pain/pressure or palpitations; back on PEG feeds; switched from nebs to inhalers yesterday as Dr. Dexter was informed by nursing that COVID positive patients cannot receive nebs at [facility]    9/21 - spent the day on AVAPS -> removed this AM with somewhat less increased work of breathing and decreased chest congestion and wheeze; VBG not sent this AM; in airborne and contact isolation due to hospital acquired COVID infection; awake and alert; occasional cough productive of scant sputum; no fevers, chills or sweats; no chest pain/pressure or palpitations;     9/23 - off AVAPS this AM - laying almost flat in bed with neck and back flexed; increased work of breathing; in airborne and contact isolation due to hospital acquired COVID infection; awake and alert; begging for lemonade; occasional cough productive of scant sputum; improving chest congestion or wheeze; no fevers, chills or sweats; no chest pain/pressure or palpitations; receiving PEG feeds when off NIV;    9/26 -  again laying almost flat in bed with neck and back flexed; decreased work of breathing now on a nasal canula during the day and AVAPS at night; airborne and contact isolation being discontinued after treatment for a hospital acquired COVID infection; awake and alert asking for something to eat and drink; occasional cough productive of scant sputum; improving chest congestion and wheeze; no fevers, chills or sweats; no chest pain/pressure or palpitations; receiving PEG feeds      PLAN/RECOMMENDATIONS:    the patient's head needs to be elevated greater than 45 degrees at all times  oxygen supplementation via a nasal canula during the day to keep saturation greater than 92% during the day  will convert AVAPS to BIPAP which is available at [facility]  ABG 9/21 -> 7.51/33/158/100%% - respiratory alkalosis on AVAPS -> VBG for tomorrow ordered  has completed a 5 day course of remdesivir  decrease solumedrol -> 20mg IV q8h  decrease albuterol/atrovent nebs -> q6h  continue pulmicort 0.5mg nebs q12h  guaifenesin 300mg 4 times daily via PEG  incentive spirometry   acapella device   CT scan 8/31 -> multiple lytic lesions are seen throughout the axial and appendicular skeleton, some of which appear increased in size from CT abdomen pelvis 3/16/2022 when evaluated in retrospect - a few lytic lesions involve the left intertrochanteric region and right acetabulum  oncology follow-up noted    s/p right simple mastectomy 4/2019 for breast cancer but did not receive adjuvant hormonal therapy    it is likely that the patient has metastatic breast cancer    started on anastrozole as the patient could not tolerate a MRI and is not a candidate for bone biopsy  treatment of HTN, HLD, CAD, aortic stenosis per cardiology  DVT prophylaxis - xarelto  glucose control  bowel regimen  analgesics  keppra    Will follow with you. Plan of care discussed with the patient at bedside and the dedicated floor NP.    Kennedy Marcano MD, [facility]  [phone]  Pulmonary Medicine

## 2022-09-26 NOTE — PROGRESS NOTE ADULT - SUBJECTIVE AND OBJECTIVE BOX
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; PEG in place    INTERVAL HISTORY: again laying almost flat in bed with neck and back flexed; decreased work of breathing now on a nasal canula during the day and AVAPS at night; airborne and contact isolation being discontinued after treatment for a hospital acquired COVID infection; awake and alert asking for something to eat and drink; occasional cough productive of scant sputum; improving chest congestion and wheeze; no fevers, chills or sweats; no chest pain/pressure or palpitations; receiving PEG feeds    REVIEW OF SYSTEMS:  Constitutional: As per interval history  HEENT: Within normal limits  CV: As per interval history  Resp: As per interval history  GI: dysphagia  : Within normal limits  Musculoskeletal: Within normal limits  Skin: Within normal limits  Neurological: CVA - left sided plegia  Psychiatric: Within normal limits  Endocrine: Within normal limits  Hematologic/Lymphatic: Within normal limits  Allergic/Immunologic: Within normal limits    MEDICATIONS:     Pulmonary "  albuterol/ipratropium for Nebulization 3 milliLiter(s) Nebulizer <User Schedule>  buDESOnide    Inhalation Suspension 0.5 milliGRAM(s) Inhalation every 12 hours  guaiFENesin Oral Liquid (Sugar-Free) 300 milliGRAM(s) Oral every 6 hours    Anti-microbials:    Cardiovascular:  digoxin  Injectable 125 MICROGram(s) IV Push daily  diltiazem    Tablet 30 milliGRAM(s) Enteral Tube every 6 hours    Other:  allopurinol 100 milliGRAM(s) Oral daily  anastrozole 1 milliGRAM(s) Oral daily  atorvastatin 20 milliGRAM(s) Oral at bedtime  bisacodyl 5 milliGRAM(s) Oral at bedtime  chlorhexidine 2% Cloths 1 Application(s) Topical <User Schedule>  dextrose 50% Injectable 25 Gram(s) IV Push once  dextrose 50% Injectable 12.5 Gram(s) IV Push once  dextrose 50% Injectable 25 Gram(s) IV Push once  dextrose Oral Gel 15 Gram(s) Oral once  glucagon  Injectable 1 milliGRAM(s) IntraMuscular once  insulin lispro (ADMELOG) corrective regimen sliding scale   SubCutaneous every 6 hours  insulin NPH human recombinant 7 Unit(s) SubCutaneous every 6 hours  levETIRAcetam  Solution 750 milliGRAM(s) Oral two times a day  levothyroxine 75 MICROGram(s) Oral daily  melatonin 3 milliGRAM(s) Oral at bedtime  methylPREDNISolone sodium succinate Injectable 20 milliGRAM(s) IV Push every 6 hours  rivaroxaban 20 milliGRAM(s) Oral with dinner  senna 2 Tablet(s) Oral at bedtime    MEDICATIONS  (PRN):  acetaminophen     Tablet .. 650 milliGRAM(s) Oral every 6 hours PRN Temp greater or equal to 38C (100.4F), Mild Pain (1 - 3)  ALPRAZolam 0.25 milliGRAM(s) Oral every 8 hours PRN anxiety  polyethylene glycol 3350 17 Gram(s) Oral daily PRN Constipation        OBJECTIVE:    POCT Blood Glucose.: 233 mg/dL (26 Sep 2022 12:09)  POCT Blood Glucose.: 149 mg/dL (26 Sep 2022 08:08)  POCT Blood Glucose.: 122 mg/dL (26 Sep 2022 05:33)  POCT Blood Glucose.: 286 mg/dL (25 Sep 2022 23:53)  POCT Blood Glucose.: 213 mg/dL (25 Sep 2022 16:39)  PHYSICAL EXAM:       ICU Vital Signs Last 24 Hrs  T(C): 36.9 (26 Sep 2022 11:03), Max: 36.9 (26 Sep 2022 11:03)  T(F): 98.4 (26 Sep 2022 11:03), Max: 98.4 (26 Sep 2022 11:03)  HR: 98 (26 Sep 2022 13:02) (98 - 115)  BP: 134/68 (26 Sep 2022 13:02) (98/55 - 134/68)  BP(mean): --  ABP: --  ABP(mean): --  RR: 20 (26 Sep 2022 11:03) (20 - 21)  SpO2: 98% (26 Sep 2022 11:03) (94% - 100%) on a 3lpm nasal canula    General: Awake. Alert. Cooperative. Less tachypneic. Appears stated age. Obese. Laying flat in bed.  HEENT: Atraumatic. Normocephalic. Anicteric. Normal oral mucosa. PERRL. EOMI. Mallampati class IV airway. Poor dentition.  Neck: Supple. Trachea midline. Thyroid without enlargement/tenderness/nodules. No carotid bruit. No JVD. Short and wide. Neck flexed and chin resting on her anterior chest.  Cardiovascular: Regular rate and rhythm. S1 S2 normal. III/VI systolic murmur  Respiratory: Using accessory muscles of respiration. Minimal bilateral course breath sounds and wheeze. Kyphosis. Poor chest wall excursion  Abdomen: Soft. Non-tender. Non-distended. No organomegaly. No masses. Normal bowel sounds. Obese. PEG.  Extremities: Warm to touch. No clubbing or cyanosis. No pedal edema. Large legs. Left leg contracted under the right leg  Pulses: 2+ peripheral pulses all extremities.	  Skin: Normal skin color. No rashes or lesions. No ecchymoses. No cyanosis. Warm to touch.  Lymph Nodes: Cervical, supraclavicular and axillary nodes normal  Neurological: Left lower extremity plegia with contraction. Left upper extremity is quite weak. A and O x 3    LABS:                          10.1   11.40 )-----------( 228      ( 25 Sep 2022 13:34 )             34.3     CBC    WBC  11.40 <==, 8.80 <==    Hemoglobin  10.1 <<==, 8.7 <<==    Hematocrit  34.3 <==, 29.4 <==    Platelets  228 <==, 233 <==      143  |  109<H>  |  36<H>  ----------------------------<  272<H>    09-25  4.7   |  23  |  0.59      LYTES    sodium  143 <==, 142 <==    potassium   4.7 <==, 4.3 <==    chloride  109 <==, 109 <==    carbon dioxide  23 <==, 21 <==    =============================================================================================  RENAL FUNCTION:    Creatinine:   0.59  <<==, 0.59  <<==, 0.56  <<==, 0.50  <<==    BUN:   36 <==, 31 <==    ============================================================================================    calcium   9.0 <==, 8.8 <==    ============================================================================================  LFTs    AST:   20 <== , 14 <== , 15 <== , 14 <==     ALT:  18  <== , 16  <== , 17  <== , 16  <==     AP:  235  <=, 216  <=, 218  <=, 234  <=    Bili:  0.4  <=, 0.5  <=, 0.4  <=, 0.5  <=    PT/INR - ( 24 Sep 2022 11:34 )   PT: 11.3 sec;   INR: 0.98 ratio      ABG - ( 21 Sep 2022 14:19 )  pH: 7.51  /  pCO2: 33    /  pO2: 158   / HCO3: 26    / Base Excess: 3.6   /  SaO2: 100.0     Venous Blood Gas:  09-19 @ 09:30  7.34/53/50/29/80.0  VBG Lactate: 3.5    Venous Blood Gas:  09-18 @ 03:50  7.39/48/47/29/78.2  VBG Lactate: 1.7    Procalcitonin, Serum: 0.23 ng/mL (09-18 @ 09:59)    D-Dimer Assay, Quantitative (09.18.22 @ 09:59)   D-Dimer Assay, Quantitative: 196:    C-Reactive Protein, Serum (09.18.22 @ 09:59)   C-Reactive Protein, Serum: 5 mg/L     Ferritin, Serum in AM (09.18.22 @ 09:59)   Ferritin, Serum: 63 ng/mL     < from: TTE with Doppler (w/Cont) (01.21.22 @ 14:08) >    Patient name: FRANKI MEHTA  YOB: 1935   Age: 86 (F)   MR#: [id number]  Study Date: 1/21/2022  ------------------------------------------------------------------------  Dimensions:    Normal Values:  LA:     2.5    2.0 - 4.0 cm  Ao:     3.3    2.0 - 3.8 cm  SEPTUM: 1.6    0.6 - 1.2 cm  PWT:    0.9    0.6 - 1.1 cm  LVIDd:  3.6    3.0 - 5.6 cm  LVIDs:  2.5    1.8 - 4.0 cm  Derived variables:  LVMI: 77 g/m2  RWT: 0.50  Fractional short: 31 %  EF (Visual Estimate): 70-75 %  ------------------------------------------------------------------------  Conclusions:  1. Concentric left ventricular hypertrophy.  2. Hyperdynamic left ventricular systolic function.  Endocardial visualization enhanced with intravenous  injection of Ultrasonic Enhancing Agent (Definity).  3. The right ventricle is not well visualized; grossly  normal right ventricular systolic function.  Pt refused to proceed with exam.  Limited Doppler study.  No trans aortic gradients.  Unable to rule out endocarditis.  ------------------------------------------------------------------------  Confirmed on 1/21/2022 - 15:42:57 by COMPA Castillo  ------------------------------------------------------------------------  ---------------------------------------------------------------------------------------------------------------    MICROBIOLOGY:     COVID-19 PCR . (09.19.22 @ 18:31)   COVID-19 PCR: Detected:    Respiratory Viral Panel with COVID-19 by RYAN (09.08.22 @ 21:38)   Rapid RVP Result: [facility]   SARS-CoV-2: [facility]  This Respiratory Panel uses polymerase chain reaction (PCR) to detect for   adenovirus; coronavirus (HKU1, NL63, 229E, OC43); human metapneumovirus   (hMPV); human enterovirus/rhinovirus (Entero/RV); influenza A; influenza   A/H1; influenza A/H3; influenza A/H1-2009; influenza B; parainfluenza   viruses 1, 2, 3, 4; respiratory syncytial virus; Mycoplasma pneumoniae;   Chlamydophila pneumoniae; and SARS-CoV-2.     Culture - Blood (09.08.22 @ 20:39)   Specimen Source: .Blood Blood-Peripheral   Culture Results:   No growth to date.     Culture - Blood (09.08.22 @ 20:25)   Specimen Source: .Blood Blood-Peripheral   Culture Results:   No growth to date.     RADIOLOGY:  [x ] Chest radiographs reviewed and interpreted by me    EXAM:  XR CHEST PORTABLE ROUTINE 1V                          PROCEDURE DATE:  09/24/2022      FINDINGS:  Radiographic interpretation is severely limited by patient positioning.  Gastrostomy tube is noted.  The heart size cannot be accurately assessed in this projection.  No focalconsolidation.  There is no pneumothorax or pleural effusion.  No acute bony abnormality.    IMPRESSION:  Markedly limited exam. Grossly clear lungs.    AMY JARAMILLO MD; Resident Radiologist  This document has been electronically signed.  PRAVIN CASTILLO MD; Attending Radiologist  This document has been electronically signed. Sep 24 2022  1:25PM  ---------------------------------------------------------------------------------------------------------------    EXAM:  XR CHEST PORTABLE URGENT 1V                          PROCEDURE DATE:  09/19/2022      FINDINGS:  Radiographic interpretation is limited by patient positioning and   overlying artifact.  Gastrostomy tube is noted.  Left humeral fixation plate and screws are noted.  The heart size cannot be accurately assessed in this projection.  Left basilar hazy opacities  Low lung volumes bilaterally.  There is no pneumothorax or large pleural effusion.  No acute bony abnormality.    IMPRESSION:  Left basilar hazy opacity may represent atelectasis.    AMY JARAMILLO MD; Resident Radiologist  This document has been electronically signed.  DIANE CLARK MD; Attending Radiologist  This document has been electronically signed. Sep 19 2022  6:08PM  ---------------------------------------------------------------------------------------------------------------    EXAM:  XR CHEST PORTABLE URGENT 1V                          PROCEDURE DATE:  09/12/2022      FINDINGS:    Gastrostomy tube.  The heart is enlarged.  Patchy left mid and lower lung opacity is minimally increased. The right   lung is clear.  No pleural effusion or pneumothorax.    IMPRESSION:  Minimally increased left mid to lower lung atelectasis..    ROBSON PIZANO MD; Resident Radiology  This document has been electronically signed.  DIANE CLARK MD; Attending Radiologist  This document has been electronically signed. Sep 12 2022  2:46PM  --------------------------------------------------------------------------------------------------------------  EXAM:  CT CHEST                          PROCEDURE DATE:  09/14/2022      FINDINGS:    LUNGS AND AIRWAYS: Patent central airways.  Subsegmental atelectasis is   seen in the bilateral upper and lower lobes.  PLEURA: No pleural effusion.  MEDIASTINUM AND GIOVANA: No lymphadenopathy.  VESSELS: Calcifications of the aorta, aortic valve and coronary arteries.  HEART: Heart size is normal. No pericardial effusion.  CHEST WALL AND LOWER NECK: Within normal limits.  VISUALIZED UPPER ABDOMEN: Cholelithiasis.  BONES: Multiple lytic bone lesions are again seen including a destructive   lesion at the level of the T5 vertebral body that may extend into the   left neural foramen (3:42), unchanged from prior CT chest.    IMPRESSION:  Subsegmental atelectasis in the bilateral upper and lower lobes.    Lytic bone lesions as described above, unchanged from prior CT chest   8/29/2022. Recommend MR thoracic spine for further evaluation.     ALEXANDRIA COLLINS MD; Resident Radiologist  This document has been electronically signed.  HAY IRVIN MD; Attending Radiologist  This document has been electronically signed. Sep 14 2022  3:23PM  ---------------------------------------------------------------------------------------------------------------  EXAM:  DUPLEX SCAN EXT VEINS LOWER BI                          PROCEDURE DATE:  09/10/2022      IMPRESSION:  Limited study  No evidence of deep venous thrombosis in either lower extremity.  Limited visualization of the calf veins.    KEYLA ROMERO MD; Attending Radiologist  This document has been electronically signed. Sep 10 2022 10:42AM  ---------------------------------------------------------------------------------------------------------------  EXAM:  CT ABDOMEN AND PELVIS IC                          PROCEDURE DATE:  08/31/2022      IMPRESSION:    Multiple lytic lesions are seen throughout the axial and appendicular   skeleton, some of which appear increased in size from CT abdomen pelvis   3/16/2022 when evaluated in retrospect. A few lytic lesions involve the   left intertrochanteric region and right acetabulum.    Endometrial thickening. Recommend dedicated pelvic sonogram.    Cystic lesions are seen within the pancreas. Recommend a dedicated MRI on   a nonemergent basis.    SULY KENDRICK MD; Resident Radiology  This document has been electronically signed.  BRITTANI MALCOLM MD; Attending Radiologist  This document has been electronically signed. Sep  1 2022 11:01AM  ---------------------------------------------------------------------------------------------------------------  EXAM:  XR HUMERUS MIN 2 VIEWS RT                          PROCEDURE DATE:  08/30/2022      EXAM:  Internal/external rotation AP right humerus from 8/30/2022 at 0927.   Compared to appearance on previous day chest CT.    IMPRESSION:  Generalized osteopenia. Redemonstrated focal lytic lesion in proximal   medial humeral diaphysis with small area of permeative cortical   destruction. No additional radiographically appreciated suspicious lytic   or blastic lesions in remaining imaged regions.    No current fractures or dislocations.    Preserved shoulder and elbow joint spaces.    IV cannula with attached tubing overlies upper arm.    MINE NICE MD; Attending Radiologist  This document has been electronically signed. Aug 30 2022 10:39AM  ---------------------------------------------------------------------------------------------------------------  EXAM:  DUPLEX EXT VEINS UPPER LT                          PROCEDURE DATE:  09/01/2022      IMPRESSION:  No evidence of left upper extremity deep venous thrombosis.    FAWN FITZGERALD MD; Attending Radiologist  This document has been electronically signed. Sep  1 2022  1:10PM  ---------------------------------------------------------------------------------------------------------------  EXAM:  DUPLEX SCAN EXT VEINS LOWER BI                          PROCEDURE DATE:  08/31/2022      IMPRESSION:  No evidence of deep venous thrombosis in either lower extremity.    KEYLA ROMERO MD; Attending Radiologist  This document has been electronically signed. Aug 31 2022  7:59PM  ------------------------------------------------------

## 2022-09-26 NOTE — PROGRESS NOTE ADULT - SUBJECTIVE AND OBJECTIVE BOX
afebrile    REVIEW OF SYSTEMS:  GEN: no fever,    no chills  RESP: no SOB,   no cough  CVS: no chest pain,   no palpitations  GI: no abdominal pain,   no nausea,   no vomiting,   no constipation,   no diarrhea  : no dysuria,   no frequency  NEURO: no headache,   no dizziness  PSYCH: no depression,   not anxious  Derm : no rash    MEDICATIONS  (STANDING):  albuterol/ipratropium for Nebulization 3 milliLiter(s) Nebulizer <User Schedule>  allopurinol 100 milliGRAM(s) Oral daily  anastrozole 1 milliGRAM(s) Oral daily  atorvastatin 20 milliGRAM(s) Oral at bedtime  bisacodyl 5 milliGRAM(s) Oral at bedtime  buDESOnide    Inhalation Suspension 0.5 milliGRAM(s) Inhalation every 12 hours  chlorhexidine 2% Cloths 1 Application(s) Topical <User Schedule>  dextrose 50% Injectable 12.5 Gram(s) IV Push once  dextrose 50% Injectable 25 Gram(s) IV Push once  dextrose 50% Injectable 25 Gram(s) IV Push once  dextrose Oral Gel 15 Gram(s) Oral once  digoxin  Injectable 125 MICROGram(s) IV Push daily  diltiazem    Tablet 30 milliGRAM(s) Enteral Tube every 6 hours  glucagon  Injectable 1 milliGRAM(s) IntraMuscular once  guaiFENesin Oral Liquid (Sugar-Free) 300 milliGRAM(s) Oral every 6 hours  insulin lispro (ADMELOG) corrective regimen sliding scale   SubCutaneous every 6 hours  insulin NPH human recombinant 7 Unit(s) SubCutaneous every 6 hours  levETIRAcetam  Solution 750 milliGRAM(s) Oral two times a day  levothyroxine 75 MICROGram(s) Oral daily  melatonin 3 milliGRAM(s) Oral at bedtime  methylPREDNISolone sodium succinate Injectable 20 milliGRAM(s) IV Push every 6 hours  rivaroxaban 20 milliGRAM(s) Oral with dinner  senna 2 Tablet(s) Oral at bedtime    MEDICATIONS  (PRN):  acetaminophen     Tablet .. 650 milliGRAM(s) Oral every 6 hours PRN Temp greater or equal to 38C (100.4F), Mild Pain (1 - 3)  ALPRAZolam 0.25 milliGRAM(s) Oral every 8 hours PRN anxiety  polyethylene glycol 3350 17 Gram(s) Oral daily PRN Constipation      Vital Signs Last 24 Hrs  T(C): 36.7 (26 Sep 2022 05:44), Max: 36.7 (25 Sep 2022 19:54)  T(F): 98.1 (26 Sep 2022 05:44), Max: 98.1 (26 Sep 2022 05:44)  HR: 115 (26 Sep 2022 06:38) (99 - 115)  BP: 104/66 (25 Sep 2022 19:54) (104/66 - 110/68)  BP(mean): --  RR: 20 (26 Sep 2022 05:44) (20 - 23)  SpO2: 98% (26 Sep 2022 06:38) (94% - 98%)    Parameters below as of 26 Sep 2022 05:44  Patient On (Oxygen Delivery Method): BiPAP/CPAP      CAPILLARY BLOOD GLUCOSE      POCT Blood Glucose.: 122 mg/dL (26 Sep 2022 05:33)  POCT Blood Glucose.: 286 mg/dL (25 Sep 2022 23:53)  POCT Blood Glucose.: 213 mg/dL (25 Sep 2022 16:39)  POCT Blood Glucose.: 240 mg/dL (25 Sep 2022 12:34)    I&O's Summary    25 Sep 2022 07:01  -  26 Sep 2022 07:00  --------------------------------------------------------  IN: 0 mL / OUT: 900 mL / NET: -900 mL        PHYSICAL EXAM:  HEAD:  Atraumatic, Normocephalic  NECK: Supple, No   JVD  CHEST/LUNG:   no     rales,     no,    rhonchi  HEART: Regular rate and rhythm;         murmur  ABDOMEN: Soft, Nontender, ;   EXTREMITIES:        edema  NEUROLOGY:  arouasable    LABS:                        10.1   11.40 )-----------( 228      ( 25 Sep 2022 13:34 )             34.3     09-25    143  |  109<H>  |  36<H>  ----------------------------<  272<H>  4.7   |  23  |  0.59    Ca    9.0      25 Sep 2022 13:34    TPro  6.5  /  Alb  3.7  /  TBili  0.4  /  DBili  0.1  /  AST  20  /  ALT  18  /  AlkPhos  235<H>  09-25    PT/INR - ( 24 Sep 2022 11:34 )   PT: 11.3 sec;   INR: 0.98 ratio                         Thyroid Stimulating Hormone, Serum: 0.32 uIU/mL (09-24 @ 11:34)          Consultant(s) Notes Reviewed:      Care Discussed with Consultants/Other Providers:

## 2022-09-26 NOTE — CONSULT NOTE ADULT - SUBJECTIVE AND OBJECTIVE BOX
Interventional Radiology    Evaluate for Procedure: PICC line placement     HPI: 87y Female with poor venous access requiring IV access for meds, phlebotomy, IR consulted for PICC line placement. PICC team attempted at bedside over weekend.     Allergies: Toradol (Urticaria (Mild to Mod); Rash (Mild to Mod))    Medications (Abx/Cardiac/Anticoagulation/Blood Products)    digoxin  Injectable: 250 MICROGram(s) IV Push (09-24 @ 23:06)  digoxin  Injectable: 250 MICROGram(s) IV Push (09-25 @ 12:31)  digoxin  Injectable: 125 MICROGram(s) IV Push (09-26 @ 12:48)  diltiazem    Tablet: 30 milliGRAM(s) Enteral Tube (09-26 @ 17:02)  rivaroxaban: 20 milliGRAM(s) Oral (09-26 @ 17:02)    Data:  T(C): 36.9  HR: 113  BP: 134/68  RR: 20  SpO2: 98%    -WBC 11.40 / HgB 10.1 / Hct 34.3 / Plt 228  -Na 143 / Cl 109 / BUN 36 / Glucose 272  -K 4.7 / CO2 23 / Cr 0.59  -ALT 18 / Alk Phos 235 / T.Bili 0.4  -INR 0.98 / PTT --    Radiology:     Assessment/Plan: 87y Female with poor venous access requiring IV access for meds, phlebotomy, IR consulted for PICC line placement. PICC team attempted at bedside over weekend.       -Will plan for PICC line placement tomorrow   -Please place IR procedure order under DIXON Curiel  -STAT am labs  -Maintain COVID within 5 days  -Maintain active T&S  -Above d/w primary team  -Please contact IR at [phone] with any questions/ concerns regarding above.

## 2022-09-26 NOTE — PROGRESS NOTE ADULT - ASSESSMENT
Assessment  DMT2: 87y Female with DM T2 with hyperglycemia, A1C 7%, now on NPH insulin and coverage, blood sugars improving, no  hypoglycemias, on peg tube feeds, remains on steroids.  Covid: on medications, stable, monitored.  Hypothyroidism: On Synthroid 75 mcg po daily, ?compliance, euthyroid.  HLD: on statin.        Jeanie Gao MD  Cell: [phone]  Office: [phone]

## 2022-09-26 NOTE — PROGRESS NOTE ADULT - SUBJECTIVE AND OBJECTIVE BOX
CARDIOLOGY     PROGRESS  NOTE   ________________________________________________    CHIEF COMPLAINT:Patient is a 87y old  Female who presents with a chief complaint of SOB (26 Sep 2022 07:12)  no complain. doing better.  	  REVIEW OF SYSTEMS:  CONSTITUTIONAL: No fever, weight loss, or fatigue  EYES: No eye pain, visual disturbances, or discharge  ENT:  No difficulty hearing, tinnitus, vertigo; No sinus or throat pain  NECK: No pain or stiffness  RESPIRATORY: No cough, wheezing, chills or hemoptysis; No Shortness of Breath  CARDIOVASCULAR: No chest pain, palpitations, passing out, dizziness, or leg swelling  GASTROINTESTINAL: No abdominal or epigastric pain. No nausea, vomiting, or hematemesis; No diarrhea or constipation. No melena or hematochezia.  GENITOURINARY: No dysuria, frequency, hematuria, or incontinence  NEUROLOGICAL: No headaches, memory loss, loss of strength, numbness, or tremors  SKIN: No itching, burning, rashes, or lesions   LYMPH Nodes: No enlarged glands  ENDOCRINE: No heat or cold intolerance; No hair loss  MUSCULOSKELETAL: No joint pain or swelling; No muscle, back, or extremity pain  PSYCHIATRIC: No depression, anxiety, mood swings, or difficulty sleeping  HEME/LYMPH: No easy bruising, or bleeding gums  ALLERGY AND IMMUNOLOGIC: No hives or eczema	    [ ] All others negative	  [x ] Unable to obtain    PHYSICAL EXAM:  T(C): 36.7 (09-26-22 @ 05:44), Max: 36.7 (09-25-22 @ 19:54)  HR: 99 (09-26-22 @ 09:46) (99 - 115)  BP: 104/66 (09-25-22 @ 19:54) (104/66 - 110/68)  RR: 20 (09-26-22 @ 05:44) (20 - 23)  SpO2: 100% (09-26-22 @ 09:46) (94% - 100%)  Wt(kg): --  I&O's Summary    25 Sep 2022 07:01  -  26 Sep 2022 07:00  --------------------------------------------------------  IN: 0 mL / OUT: 900 mL / NET: -900 mL        Appearance: Normal	  HEENT:   Normal oral mucosa, PERRL, EOMI	  Lymphatic: No lymphadenopathy  Cardiovascular: Normal S1 S2, No JVD, + murmurs, + edema  Respiratory: rhonchi  Psychiatry: A & O x 3, Mood & affect appropriate  Gastrointestinal:  Soft, Non-tender, + BS	  Skin: No rashes, No ecchymoses, No cyanosis	  Neurologic: Non-focal  Extremities: Normal range of motion, No clubbing, cyanosis , + edema  Vascular: Peripheral pulses palpable 2+ bilaterally    MEDICATIONS  (STANDING):  albuterol/ipratropium for Nebulization 3 milliLiter(s) Nebulizer <User Schedule>  allopurinol 100 milliGRAM(s) Oral daily  anastrozole 1 milliGRAM(s) Oral daily  atorvastatin 20 milliGRAM(s) Oral at bedtime  bisacodyl 5 milliGRAM(s) Oral at bedtime  buDESOnide    Inhalation Suspension 0.5 milliGRAM(s) Inhalation every 12 hours  chlorhexidine 2% Cloths 1 Application(s) Topical <User Schedule>  dextrose 50% Injectable 25 Gram(s) IV Push once  dextrose 50% Injectable 12.5 Gram(s) IV Push once  dextrose 50% Injectable 25 Gram(s) IV Push once  dextrose Oral Gel 15 Gram(s) Oral once  digoxin  Injectable 125 MICROGram(s) IV Push daily  diltiazem    Tablet 30 milliGRAM(s) Enteral Tube every 6 hours  glucagon  Injectable 1 milliGRAM(s) IntraMuscular once  guaiFENesin Oral Liquid (Sugar-Free) 300 milliGRAM(s) Oral every 6 hours  insulin lispro (ADMELOG) corrective regimen sliding scale   SubCutaneous every 6 hours  insulin NPH human recombinant 7 Unit(s) SubCutaneous every 6 hours  levETIRAcetam  Solution 750 milliGRAM(s) Oral two times a day  levothyroxine 75 MICROGram(s) Oral daily  melatonin 3 milliGRAM(s) Oral at bedtime  methylPREDNISolone sodium succinate Injectable 20 milliGRAM(s) IV Push every 6 hours  rivaroxaban 20 milliGRAM(s) Oral with dinner  senna 2 Tablet(s) Oral at bedtime      TELEMETRY: 	    ECG:  	  RADIOLOGY:  OTHER: 	  	  LABS:	 	    CARDIAC MARKERS:                                10.1   11.40 )-----------( 228      ( 25 Sep 2022 13:34 )             34.3     09-25    143  |  109<H>  |  36<H>  ----------------------------<  272<H>  4.7   |  23  |  0.59    Ca    9.0      25 Sep 2022 13:34    TPro  6.5  /  Alb  3.7  /  TBili  0.4  /  DBili  0.1  /  AST  20  /  ALT  18  /  AlkPhos  235<H>  09-25    proBNP: Serum Pro-Brain Natriuretic Peptide: 461 pg/mL (09-08 @ 20:56)  Serum Pro-Brain Natriuretic Peptide: 155 pg/mL (08-27 @ 21:06)    Lipid Profile:   HgA1c:   TSH: Thyroid Stimulating Hormone, Serum: 0.32 uIU/mL (09-24 @ 11:34)  Thyroid Stimulating Hormone, Serum: 0.30 uIU/mL (09-24 @ 11:34)    PT/INR - ( 24 Sep 2022 11:34 )   PT: 11.3 sec;   INR: 0.98 ratio               Assessment and plan  ---------------------------  86F w/ extensive hx including severe tracheobronchomalacia (c/b hypoxia 2/2 mucous plugging and recurrent L lung collapse), 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 Very limited hx obtained from pt given mental status, dyspnea, and use of BIPAP. Unable to reach sonSy. History obtained from staff/chart review.    Per ED staff who saw pt when she first arrived, pt was notably dyspneic with increased WOB and wheezing. Supposedly missed use of nightly BIPAP at rehab. Reportedly denied fever and cough. On encounter for admission, pt with BIPAP mask on, appearing slightly lethargic and mildly dyspneic, but was able to answer some questions. Pt endorsed having SOB. Denied pain. Seemed to believe initially that she was at [facility]? but was difficult to understand her responses.    Of note, pt has multiple recent admissions with similar presentation. Most recently was hospitalized from 8/28/22-9/8/22 for SOB, treated with airway clearance and completed 5-day course of Zosyn for empiric coverage of PNA (though per Pulm, unlikely to have PNA). Course c/b PEG displacement and subsequent reinsertion by IR on 8/30. In addition to her usual PEG feeds, pt was also started on puree diet with moderately thickened fluids for pleasure feeds. Also was started on anastrozole for most likely dx of metastatic breast cancer to bones (pt was unable to tolerate further cancer workup of spine lesions). Pt remained full code during recent admissions.  pt with metastatic ca to the bone with sob sec to obesity and underlying lung and cardiac disease  increase sob sec to missing BiPAP at night  may consider pulmonary eval  may consider hospice care/ palliative care  continue Diltiazem  pt will need home o2 and Bipap at night  discussed with family at the bed side   dvt prophylaxis high risk on xarelto  pt with bone mets, no further nieto/ onc noted  on steroid dose has increased   a.fib on 09/23/increase Cardizem dose, will add digoxin  may increase Xarelto dose sec to a.fib  add digoxin/converted to NSR/ tach

## 2022-09-27 NOTE — PROGRESS NOTE ADULT - ASSESSMENT
89    year old female    h/o hemorrhagic CVA, has  PEG,  HTN, MI,   HLD, gout, right ca  breast   right Ca breast. s/p mastectomy in 2019,  s/p  sentinel node  localization.  4/4,  were  negative  s/p rrt  . in past,  for hypoxia. cxr with complete  collapsed  of  left lung, needing broch,   s p  bronchoscopy , pt  with  tracheomalacia  and  collapsed  airways,  makes  this a  difficult  situation, as there is no good rx for this     h/o bacteremia. on   pna, perforated  appendicitis,  ?  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 this will not alter rx. pt  redvd  extended  course  of ab   on iv  vanco and ertapenem.  till  2/9/.22.        *  admitted with   presumed  resp  failure/ pt was  sent back from [facility], the same day   pt seen in er.  appears   at her naseline.  no  wheezing /  atousable    ENT eval w/ laryngoscopy notable for vocal cords mobile and intact, no edema, upper airway patent     hypertonic saline for airway clearance   cxr, no pna     *   s/p cva, has  PEG,  npo  ,   on  synthroid, Keppra  ,  *   HTN, on  cardizem,  per  card dr jaimson  *  h/o  Ca breast. /, s/p  R  mastectomy  *   obesity, bmi  was   41, now  is  30   *     c/c left  leg contracture ,  remains  bed  bound. functional  paraplegia,  needs  assistance  for  all her  adl's   *  pt  with  h/o  tracheomalacia  and  collapsed  airways, propensity  for  lung collpse,   pt is  at risk for  re admissions     on nocturnal  bipap,  difficult  situation, a s there  is no  good  rx  for  pt's  recurrent lung collapse hypoxia     h/o  of  chronic    mild  tachycardia          *   CT chest. lytic  lesions, T  spine/  ribs/ humerus/  oncoloigy  d r ohri.  suspect  metastatic ca breast        son. guicho Dan/ oncologist  has  also  discussed  with  son,  futility of  pursuing  bx. a s pt  is  not an ideal   candidate  for  chemo    CT  A.p ,/ prelim ,  pelvic  mets  per  oncology,   suspect  metastatic ca  breast,  was  awaiting   mri  spine/  pt unable  to tolerate  mri   per oncology, no  further  intervention/  and on  Arimidex, now,   per d r ohri   obesity,  bed bound.  functional paraplegia    per  son,   rehab promptly  returned   to er/ as they  did not  have  a  bipap machine /  care cortn to f/.p, needs  24/7  O2/ nocturnal bipap   difficult  situation,  bed  bound. obesity. c/c  hypercarbia, needing , prn /  nocturnal bipap/  with intermittent tachypnea    now  is  covid +. on iv steroid/ was  wheezing/ remdissivir/, seen by  house  ID   pt  with  frequent episodes  of tachypnea. has  obstructive /restrictive   lung  disease ,  obesity/ RAQUEL/ hypercarbia   called son, mlple  times,  message  left/    pt  remains  at  her  baseline  minimally   verbal  Afib, on xarelto  20m mg qd    s/p tachycardia, on   cardizem  and digoxin/  remains on iv steroid  pe r pulm    await   picc line  by   IR .  has  poor  access  doubt  pt  can have meaningful  recovery      per  son, pt is  full code

## 2022-09-27 NOTE — PROGRESS NOTE ADULT - SUBJECTIVE AND OBJECTIVE BOX
Pt arrived for PICC line off Bipap with significant respiratory distress and unable to proceed with PICC in procedure room.  Long 5 Yoruba angiocath was placed lnto left basilic vein using ultrasound guidance for improved venous access.  If pt needs PICC still needs to be more medically optimized as the need for fluoroscopic guidance necessitate being able to place patient supine on procedure table.

## 2022-09-27 NOTE — CHART NOTE - NSCHARTNOTEFT_GEN_A_CORE
Nutrition Follow Up Note  Patient seen for: TF follow up     Chart reviewed, events noted.    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 Very limited hx obtained from pt given mental status, dyspnea, and use of BIPAP. Unable to reach sonSy. History obtained from staff/chart review."    Source: [] Patient       [x] EMR        [] RN        [] Family at bedside       [x] Other: PCA    -If unable to interview patient: [] Trach/Vent/BiPAP  [x] Disoriented/confused/inappropriate to interview    Diet Order:   Diet, NPO with Tube Feed:   Tube Feeding Modality: Gastrostomy  Jevity 1.5 Naseem (JEVITY1.5RTH)  Total Volume for 24 Hours (mL): 1260  Continuous  Starting Tube Feed Rate {mL per Hour}: 10  Increase Tube Feed Rate by (mL): 10     Every 4 hours  Until Goal Tube Feed Rate (mL per Hour): 70  Tube Feed Duration (in Hours): 18  Tube Feed Start Time: 06:00 (09-09-22)    - Is current order appropriate/adequate? [] Yes  []  No:     - PO intake :   [] >75%  Adequate    [] 50-75%  Fair       [] <50%  Poor  RD observed pt tolerating TF at goal, jevity 1.5 @70 ml ; at goal EN provides: 1260 ml, 1890 kcal (20 kcal/kg), 80 g/pro (0.9 g/pro/kg), 958 ml free water. defer water flushes to team. (based on dosing wt for calories and IBW for pro).  i  - Nutrition-related concerns:  - noted elevated <H> (9/25) and POCT 118-128 (9/27); consider changing formula to glucerna;   Team to provide/adjust insulin as needed to help maintain BG WNL     GI:  Last BM 9/27   Bowel Regimen? [x] Yes   [] No  no noted GI distress at this time per RN    Weights:   Daily N/A    Drug Dosing Weight  Height (cm): 162.6 (08 Sep 2022 20:27)  Weight (kg): 93.213 (09 Sep 2022 15:52)  BMI (kg/m2): 35.3 (09 Sep 2022 15:52)  BSA (m2): 1.98 (09 Sep 2022 15:52)    MEDICATIONS  (STANDING):  albuterol/ipratropium for Nebulization 3 milliLiter(s) Nebulizer every 6 hours  allopurinol 100 milliGRAM(s) Oral daily  anastrozole 1 milliGRAM(s) Oral daily  atorvastatin 20 milliGRAM(s) Oral at bedtime  bisacodyl 5 milliGRAM(s) Oral at bedtime  buDESOnide    Inhalation Suspension 0.5 milliGRAM(s) Inhalation every 12 hours  chlorhexidine 2% Cloths 1 Application(s) Topical <User Schedule>  dextrose 50% Injectable 25 Gram(s) IV Push once  dextrose 50% Injectable 12.5 Gram(s) IV Push once  dextrose 50% Injectable 25 Gram(s) IV Push once  dextrose Oral Gel 15 Gram(s) Oral once  digoxin  Injectable 125 MICROGram(s) IV Push daily  diltiazem    Tablet 30 milliGRAM(s) Enteral Tube every 6 hours  glucagon  Injectable 1 milliGRAM(s) IntraMuscular once  guaiFENesin Oral Liquid (Sugar-Free) 300 milliGRAM(s) Oral every 6 hours  insulin lispro (ADMELOG) corrective regimen sliding scale   SubCutaneous every 6 hours  insulin NPH human recombinant 7 Unit(s) SubCutaneous every 6 hours  levETIRAcetam  Solution 750 milliGRAM(s) Oral two times a day  levothyroxine 75 MICROGram(s) Oral daily  melatonin 3 milliGRAM(s) Oral at bedtime  methylPREDNISolone sodium succinate Injectable 20 milliGRAM(s) IV Push every 8 hours  rivaroxaban 20 milliGRAM(s) Oral with dinner  senna 2 Tablet(s) Oral at bedtime    MEDICATIONS  (PRN):  acetaminophen     Tablet .. 650 milliGRAM(s) Oral every 6 hours PRN Temp greater or equal to 38C (100.4F), Mild Pain (1 - 3)  ALPRAZolam 0.25 milliGRAM(s) Oral every 8 hours PRN anxiety  polyethylene glycol 3350 17 Gram(s) Oral daily PRN Constipation    Pertinent Labs:  (9/25)  A1C with Estimated Average Glucose Result: 6.7 % (09-25-22 @ 13:34)  A1C with Estimated Average Glucose Result: 7.0 % (09-24-22 @ 11:34)  A1C with Estimated Average Glucose Result: See Note (09-23-22 @ 12:21)    Finger Sticks:  POCT Blood Glucose.: 118 mg/dL (09-27-22 @ 05:21)  POCT Blood Glucose.: 128 mg/dL (09-27-22 @ 00:06)  POCT Blood Glucose.: 242 mg/dL (09-26-22 @ 17:36)  POCT Blood Glucose.: 233 mg/dL (09-26-22 @ 12:09)    Skin per nursing documentation: +skin tear; no noted pressure injuries as per flowsheets   Edema: generalized non pitting edema     Estimated Needs:   [x] no change since previous assessment  [] recalculated:   1597-7520 kcal/day (20-25 kcal/kg) based on dosing wt 93.2 kg  60-72 g/pro (1-1.2 g/pro/kg) based on IBW +10%, 59.8 kg    Previous Nutrition Diagnosis: Swallowing Difficulty  Nutrition Diagnosis is: [x] ongoing  [] resolved [] not applicable     New Nutrition Diagnosis: [x] Not applicable    Nutrition Care Plan:  [x] In Progress- addressed with EN diet order  [] Achieved  [] Not applicable    Nutrition Interventions:     Education Provided:       [] Yes:  [] No: education not warranted/appropriate at this time        Recommendations:      - Continue Jevity 1.5 at goal 70 ml/hr x18 hr/day. if glucose levels continue to trend high, consider changing TF to glucerna 1.5 @70 ml x18 hr.    IF NO ASPIRATION risks, and within GOC, if bolus feeds considered: jevity 1.5 x5cans /day, provides:  1185 ml, 1778 kcal (19 kcal/kg)  76 g/pro (1.3 g/pro/kg), 901 ml free water (based on dosing wt for calories and IBW for pro). defer fluids to team   - Monitor GI tolerance. RD to remain available to adjust EN formulary, volume/rate PRN.   - Team to provide/adjust insulin as needed to help maintain BG WNL   - monitor bowel movement and adust bowel regimen as indicated, defer to team  - Nutrition care plan to remain consistent with pt goals of care    Monitoring and Evaluation:   Continue to monitor nutritional intake, tolerance to diet prescription, weights, labs, skin integrity    RD remains available upon request and will follow up per protocol  Paola Comer RD CDN #[phone]

## 2022-09-27 NOTE — PROGRESS NOTE ADULT - SUBJECTIVE AND OBJECTIVE BOX
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; PEG in place    INTERVAL HISTORY: again laying almost flat in bed with neck and back flexed; replaced on AVAPS after my visit yesterday for increased work of breathing and bronchospasm; airborne and contact isolation has been discontinued after treatment for a hospital acquired COVID infection; occasional cough productive of scant sputum; improving chest congestion and wheeze; no fevers, chills or sweats; no chest pain/pressure or palpitations; awaiting placement of a PICC line    REVIEW OF SYSTEMS:  Constitutional: As per interval history  HEENT: Within normal limits  CV: As per interval history  Resp: As per interval history  GI: dysphagia  : Within normal limits  Musculoskeletal: Within normal limits  Skin: Within normal limits  Neurological: CVA - left sided plegia  Psychiatric: Within normal limits  Endocrine: Within normal limits  Hematologic/Lymphatic: Within normal limits  Allergic/Immunologic: Within normal limits    MEDICATIONS:     Pulmonary "  albuterol/ipratropium for Nebulization 3 milliLiter(s) Nebulizer every 6 hours  buDESOnide    Inhalation Suspension 0.5 milliGRAM(s) Inhalation every 12 hours  guaiFENesin Oral Liquid (Sugar-Free) 300 milliGRAM(s) Oral every 6 hours    Anti-microbials:    Cardiovascular:  digoxin  Injectable 125 MICROGram(s) IV Push daily  diltiazem    Tablet 30 milliGRAM(s) Enteral Tube every 6 hours    Other:  allopurinol 100 milliGRAM(s) Oral daily  anastrozole 1 milliGRAM(s) Oral daily  atorvastatin 20 milliGRAM(s) Oral at bedtime  bisacodyl 5 milliGRAM(s) Oral at bedtime  chlorhexidine 2% Cloths 1 Application(s) Topical <User Schedule>  dextrose 50% Injectable 25 Gram(s) IV Push once  dextrose 50% Injectable 12.5 Gram(s) IV Push once  dextrose 50% Injectable 25 Gram(s) IV Push once  dextrose Oral Gel 15 Gram(s) Oral once  glucagon  Injectable 1 milliGRAM(s) IntraMuscular once  insulin lispro (ADMELOG) corrective regimen sliding scale   SubCutaneous every 6 hours  insulin NPH human recombinant 7 Unit(s) SubCutaneous every 6 hours  levETIRAcetam  Solution 750 milliGRAM(s) Oral two times a day  levothyroxine 75 MICROGram(s) Oral daily  melatonin 3 milliGRAM(s) Oral at bedtime  methylPREDNISolone sodium succinate Injectable 20 milliGRAM(s) IV Push every 8 hours  rivaroxaban 20 milliGRAM(s) Oral with dinner  senna 2 Tablet(s) Oral at bedtime    MEDICATIONS  (PRN):  acetaminophen     Tablet .. 650 milliGRAM(s) Oral every 6 hours PRN Temp greater or equal to 38C (100.4F), Mild Pain (1 - 3)  ALPRAZolam 0.25 milliGRAM(s) Oral every 8 hours PRN anxiety  polyethylene glycol 3350 17 Gram(s) Oral daily PRN Constipation    OBJECTIVE:    POCT Blood Glucose.: 118 mg/dL (27 Sep 2022 05:21)  POCT Blood Glucose.: 128 mg/dL (27 Sep 2022 00:06)  POCT Blood Glucose.: 242 mg/dL (26 Sep 2022 17:36)  POCT Blood Glucose.: 233 mg/dL (26 Sep 2022 12:09)      PHYSICAL EXAM:       ICU Vital Signs Last 24 Hrs  T(C): 36.7 (27 Sep 2022 04:13), Max: 36.9 (26 Sep 2022 11:03)  T(F): 98.1 (27 Sep 2022 04:13), Max: 98.5 (26 Sep 2022 22:13)  HR: 98 (27 Sep 2022 04:13) (95 - 113)  BP: 112/62 (27 Sep 2022 04:13) (98/55 - 134/68)  BP(mean): --  ABP: --  ABP(mean): --  RR: 20 (27 Sep 2022 04:13) (20 - 21)  SpO2: 98% (27 Sep 2022 04:13) (98% - 100%)    O2 Parameters below as of 27 Sep 2022 04:13  Patient On (Oxygen Delivery Method): BiPAP/CPAP    General: Awake. Alert. Cooperative. No distress. Appears stated age. Obese. Laying flat in bed with neck flexed onto her anterior chest.  HEENT: Atraumatic. Normocephalic. Anicteric. Normal oral mucosa. PERRL. EOMI. Mallampati class IV airway. Poor dentition.  Neck: Supple. Trachea midline. Thyroid without enlargement/tenderness/nodules. No carotid bruit. No JVD. Short and wide. Neck flexed and chin resting on her anterior chest.  Cardiovascular: Regular rate and rhythm. S1 S2 normal. III/VI systolic murmur  Respiratory: Using accessory muscles of respiration. Minimal bilateral course breath sounds and wheeze. Kyphosis. Poor chest wall excursion  Abdomen: Soft. Non-tender. Non-distended. No organomegaly. No masses. Normal bowel sounds. Obese. PEG.  Extremities: Warm to touch. No clubbing or cyanosis. No pedal edema. Large legs. Left leg contracted under the right leg  Pulses: 2+ peripheral pulses all extremities.	  Skin: Normal skin color. No rashes or lesions. No ecchymoses. No cyanosis. Warm to touch.  Lymph Nodes: Cervical, supraclavicular and axillary nodes normal  Neurological: Left lower extremity plegia with contraction. Left upper extremity is quite weak. A and O x 3    LABS:                          10.1   11.40 )-----------( 228      ( 25 Sep 2022 13:34 )             34.3     CBC    WBC  11.40 <==, 8.80 <==    Hemoglobin  10.1 <<==, 8.7 <<==    Hematocrit  34.3 <==, 29.4 <==    Platelets  228 <==, 233 <==      143  |  109<H>  |  36<H>  ----------------------------<  272<H>    09-25  4.7   |  23  |  0.59      LYTES    sodium  143 <==, 142 <==    potassium   4.7 <==, 4.3 <==    chloride  109 <==, 109 <==    carbon dioxide  23 <==, 21 <==    =============================================================================================  RENAL FUNCTION:    Creatinine:   0.59  <<==, 0.59  <<==, 0.56  <<==, 0.50  <<==    BUN:   36 <==, 31 <==    ============================================================================================    calcium   9.0 <==, 8.8 <==    ============================================================================================  LFTs    AST:   20 <== , 14 <== , 15 <== , 14 <==     ALT:  18  <== , 16  <== , 17  <== , 16  <==     AP:  235  <=, 216  <=, 218  <=, 234  <=    Bili:  0.4  <=, 0.5  <=, 0.4  <=, 0.5  <=    PT/INR - ( 24 Sep 2022 11:34 )   PT: 11.3 sec;   INR: 0.98 ratio      ABG - ( 21 Sep 2022 14:19 )  pH: 7.51  /  pCO2: 33    /  pO2: 158   / HCO3: 26    / Base Excess: 3.6   /  SaO2: 100.0     Venous Blood Gas:  09-19 @ 09:30  7.34/53/50/29/80.0  VBG Lactate: 3.5    Venous Blood Gas:  09-18 @ 03:50  7.39/48/47/29/78.2  VBG Lactate: 1.7    Procalcitonin, Serum: 0.23 ng/mL (09-18 @ 09:59)    D-Dimer Assay, Quantitative (09.18.22 @ 09:59)   D-Dimer Assay, Quantitative: 196:    C-Reactive Protein, Serum (09.18.22 @ 09:59)   C-Reactive Protein, Serum: 5 mg/L     Ferritin, Serum in AM (09.18.22 @ 09:59)   Ferritin, Serum: 63 ng/mL     < from: TTE with Doppler (w/Cont) (01.21.22 @ 14:08) >    Patient name: FRANKI MEHTA  YOB: 1935   Age: 86 (F)   MR#: [id number]  Study Date: 1/21/2022  ------------------------------------------------------------------------  Dimensions:    Normal Values:  LA:     2.5    2.0 - 4.0 cm  Ao:     3.3    2.0 - 3.8 cm  SEPTUM: 1.6    0.6 - 1.2 cm  PWT:    0.9    0.6 - 1.1 cm  LVIDd:  3.6    3.0 - 5.6 cm  LVIDs:  2.5    1.8 - 4.0 cm  Derived variables:  LVMI: 77 g/m2  RWT: 0.50  Fractional short: 31 %  EF (Visual Estimate): 70-75 %  ------------------------------------------------------------------------  Conclusions:  1. Concentric left ventricular hypertrophy.  2. Hyperdynamic left ventricular systolic function.  Endocardial visualization enhanced with intravenous  injection of Ultrasonic Enhancing Agent (Definity).  3. The right ventricle is not well visualized; grossly  normal right ventricular systolic function.  Pt refused to proceed with exam.  Limited Doppler study.  No trans aortic gradients.  Unable to rule out endocarditis.  ------------------------------------------------------------------------  Confirmed on 1/21/2022 - 15:42:57 by COMPA CastilloE  ------------------------------------------------------------------------  ---------------------------------------------------------------------------------------------------------------    MICROBIOLOGY:     COVID-19 PCR . (09.19.22 @ 18:31)   COVID-19 PCR: Detected:    Respiratory Viral Panel with COVID-19 by RYAN (09.08.22 @ 21:38)   Rapid RVP Result: [facility]   SARS-CoV-2: [facility]  This Respiratory Panel uses polymerase chain reaction (PCR) to detect for   adenovirus; coronavirus (HKU1, NL63, 229E, OC43); human metapneumovirus   (hMPV); human enterovirus/rhinovirus (Entero/RV); influenza A; influenza   A/H1; influenza A/H3; influenza A/H1-2009; influenza B; parainfluenza   viruses 1, 2, 3, 4; respiratory syncytial virus; Mycoplasma pneumoniae;   Chlamydophila pneumoniae; and SARS-CoV-2.     Culture - Blood (09.08.22 @ 20:39)   Specimen Source: .Blood Blood-Peripheral   Culture Results:   No growth to date.     Culture - Blood (09.08.22 @ 20:25)   Specimen Source: .Blood Blood-Peripheral   Culture Results:   No growth to date.     RADIOLOGY:  [x ] Chest radiographs reviewed and interpreted by me    EXAM:  XR CHEST PORTABLE ROUTINE 1V                          PROCEDURE DATE:  09/24/2022      FINDINGS:  Radiographic interpretation is severely limited by patient positioning.  Gastrostomy tube is noted.  The heart size cannot be accurately assessed in this projection.  No focalconsolidation.  There is no pneumothorax or pleural effusion.  No acute bony abnormality.    IMPRESSION:  Markedly limited exam. Grossly clear lungs.    AMY JARAMILLO MD; Resident Radiologist  This document has been electronically signed.  PRAVIN CASTILLO MD; Attending Radiologist  This document has been electronically signed. Sep 24 2022  1:25PM  ---------------------------------------------------------------------------------------------------------------    EXAM:  XR CHEST PORTABLE URGENT 1V                          PROCEDURE DATE:  09/19/2022      FINDINGS:  Radiographic interpretation is limited by patient positioning and   overlying artifact.  Gastrostomy tube is noted.  Left humeral fixation plate and screws are noted.  The heart size cannot be accurately assessed in this projection.  Left basilar hazy opacities  Low lung volumes bilaterally.  There is no pneumothorax or large pleural effusion.  No acute bony abnormality.    IMPRESSION:  Left basilar hazy opacity may represent atelectasis.    AMY JARAMILLO MD; Resident Radiologist  This document has been electronically signed.  DIANE CLARK MD; Attending Radiologist  This document has been electronically signed. Sep 19 2022  6:08PM  ---------------------------------------------------------------------------------------------------------------    EXAM:  XR CHEST PORTABLE URGENT 1V                          PROCEDURE DATE:  09/12/2022      FINDINGS:    Gastrostomy tube.  The heart is enlarged.  Patchy left mid and lower lung opacity is minimally increased. The right   lung is clear.  No pleural effusion or pneumothorax.    IMPRESSION:  Minimally increased left mid to lower lung atelectasis..    ROBSON PIZANO MD; Resident Radiology  This document has been electronically signed.  DIANE CLARK MD; Attending Radiologist  This document has been electronically signed. Sep 12 2022  2:46PM  --------------------------------------------------------------------------------------------------------------  EXAM:  CT CHEST                          PROCEDURE DATE:  09/14/2022      FINDINGS:    LUNGS AND AIRWAYS: Patent central airways.  Subsegmental atelectasis is   seen in the bilateral upper and lower lobes.  PLEURA: No pleural effusion.  MEDIASTINUM AND GIOVANA: No lymphadenopathy.  VESSELS: Calcifications of the aorta, aortic valve and coronary arteries.  HEART: Heart size is normal. No pericardial effusion.  CHEST WALL AND LOWER NECK: Within normal limits.  VISUALIZED UPPER ABDOMEN: Cholelithiasis.  BONES: Multiple lytic bone lesions are again seen including a destructive   lesion at the level of the T5 vertebral body that may extend into the   left neural foramen (3:42), unchanged from prior CT chest.    IMPRESSION:  Subsegmental atelectasis in the bilateral upper and lower lobes.    Lytic bone lesions as described above, unchanged from prior CT chest   8/29/2022. Recommend MR thoracic spine for further evaluation.     ALEXANDRIA COLLINS MD; Resident Radiologist  This document has been electronically signed.  HAY IRVIN MD; Attending Radiologist  This document has been electronically signed. Sep 14 2022  3:23PM  ---------------------------------------------------------------------------------------------------------------  EXAM:  DUPLEX SCAN EXT VEINS LOWER BI                          PROCEDURE DATE:  09/10/2022      IMPRESSION:  Limited study  No evidence of deep venous thrombosis in either lower extremity.  Limited visualization of the calf veins.    KEYLA ROMERO MD; Attending Radiologist  This document has been electronically signed. Sep 10 2022 10:42AM  ---------------------------------------------------------------------------------------------------------------  EXAM:  CT ABDOMEN AND PELVIS IC                          PROCEDURE DATE:  08/31/2022      IMPRESSION:    Multiple lytic lesions are seen throughout the axial and appendicular   skeleton, some of which appear increased in size from CT abdomen pelvis   3/16/2022 when evaluated in retrospect. A few lytic lesions involve the   left intertrochanteric region and right acetabulum.    Endometrial thickening. Recommend dedicated pelvic sonogram.    Cystic lesions are seen within the pancreas. Recommend a dedicated MRI on   a nonemergent basis.    SULY KENDRICK MD; Resident Radiology  This document has been electronically signed.  BRITTANI MALCOLM MD; Attending Radiologist  This document has been electronically signed. Sep  1 2022 11:01AM  ---------------------------------------------------------------------------------------------------------------  EXAM:  XR HUMERUS MIN 2 VIEWS RT                          PROCEDURE DATE:  08/30/2022      EXAM:  Internal/external rotation AP right humerus from 8/30/2022 at 0927.   Compared to appearance on previous day chest CT.    IMPRESSION:  Generalized osteopenia. Redemonstrated focal lytic lesion in proximal   medial humeral diaphysis with small area of permeative cortical   destruction. No additional radiographically appreciated suspicious lytic   or blastic lesions in remaining imaged regions.    No current fractures or dislocations.    Preserved shoulder and elbow joint spaces.    IV cannula with attached tubing overlies upper arm.    MINE NICE MD; Attending Radiologist  This document has been electronically signed. Aug 30 2022 10:39AM  ---------------------------------------------------------------------------------------------------------------  EXAM:  DUPLEX EXT VEINS UPPER LT                          PROCEDURE DATE:  09/01/2022      IMPRESSION:  No evidence of left upper extremity deep venous thrombosis.    FAWN FITZGERALD MD; Attending Radiologist  This document has been electronically signed. Sep  1 2022  1:10PM  ---------------------------------------------------------------------------------------------------------------  EXAM:  DUPLEX SCAN EXT VEINS LOWER BI                          PROCEDURE DATE:  08/31/2022      IMPRESSION:  No evidence of deep venous thrombosis in either lower extremity.    KEYLA ROMERO MD; Attending Radiologist  This document has been electronically signed. Aug 31 2022  7:59PM  ------------------------------------------------------

## 2022-09-27 NOTE — PROGRESS NOTE ADULT - SUBJECTIVE AND OBJECTIVE BOX
[facility]  REVIEW OF SYSTEMS:  GEN: no fever,    no chills  RESP: no SOB,   no cough  CVS: no chest pain,   no palpitations  GI: no abdominal pain,   no nausea,   no vomiting,   no constipation,   no diarrhea  : no dysuria,   no frequency  NEURO: no headache,   no dizziness  PSYCH: no depression,   not anxious  Derm : no rash    MEDICATIONS  (STANDING):  albuterol/ipratropium for Nebulization 3 milliLiter(s) Nebulizer every 6 hours  allopurinol 100 milliGRAM(s) Oral daily  anastrozole 1 milliGRAM(s) Oral daily  atorvastatin 20 milliGRAM(s) Oral at bedtime  bisacodyl 5 milliGRAM(s) Oral at bedtime  buDESOnide    Inhalation Suspension 0.5 milliGRAM(s) Inhalation every 12 hours  chlorhexidine 2% Cloths 1 Application(s) Topical <User Schedule>  dextrose 50% Injectable 25 Gram(s) IV Push once  dextrose 50% Injectable 12.5 Gram(s) IV Push once  dextrose 50% Injectable 25 Gram(s) IV Push once  dextrose Oral Gel 15 Gram(s) Oral once  digoxin  Injectable 125 MICROGram(s) IV Push daily  diltiazem    Tablet 30 milliGRAM(s) Enteral Tube every 6 hours  glucagon  Injectable 1 milliGRAM(s) IntraMuscular once  guaiFENesin Oral Liquid (Sugar-Free) 300 milliGRAM(s) Oral every 6 hours  insulin lispro (ADMELOG) corrective regimen sliding scale   SubCutaneous every 6 hours  insulin NPH human recombinant 7 Unit(s) SubCutaneous every 6 hours  levETIRAcetam  Solution 750 milliGRAM(s) Oral two times a day  levothyroxine 75 MICROGram(s) Oral daily  melatonin 3 milliGRAM(s) Oral at bedtime  methylPREDNISolone sodium succinate Injectable 20 milliGRAM(s) IV Push every 8 hours  rivaroxaban 20 milliGRAM(s) Oral with dinner  senna 2 Tablet(s) Oral at bedtime    MEDICATIONS  (PRN):  acetaminophen     Tablet .. 650 milliGRAM(s) Oral every 6 hours PRN Temp greater or equal to 38C (100.4F), Mild Pain (1 - 3)  ALPRAZolam 0.25 milliGRAM(s) Oral every 8 hours PRN anxiety  polyethylene glycol 3350 17 Gram(s) Oral daily PRN Constipation      Vital Signs Last 24 Hrs  T(C): 36.7 (27 Sep 2022 04:13), Max: 36.9 (26 Sep 2022 11:03)  T(F): 98.1 (27 Sep 2022 04:13), Max: 98.5 (26 Sep 2022 22:13)  HR: 98 (27 Sep 2022 04:13) (95 - 113)  BP: 112/62 (27 Sep 2022 04:13) (98/55 - 134/68)  BP(mean): --  RR: 20 (27 Sep 2022 04:13) (20 - 21)  SpO2: 98% (27 Sep 2022 04:13) (98% - 100%)    Parameters below as of 27 Sep 2022 04:13  Patient On (Oxygen Delivery Method): BiPAP/CPAP      CAPILLARY BLOOD GLUCOSE      POCT Blood Glucose.: 118 mg/dL (27 Sep 2022 05:21)  POCT Blood Glucose.: 128 mg/dL (27 Sep 2022 00:06)  POCT Blood Glucose.: 242 mg/dL (26 Sep 2022 17:36)  POCT Blood Glucose.: 233 mg/dL (26 Sep 2022 12:09)  POCT Blood Glucose.: 149 mg/dL (26 Sep 2022 08:08)    I&O's Summary    25 Sep 2022 07:01  -  26 Sep 2022 07:00  --------------------------------------------------------  IN: 0 mL / OUT: 900 mL / NET: -900 mL    26 Sep 2022 07:01  -  27 Sep 2022 06:51  --------------------------------------------------------  IN: 0 mL / OUT: 200 mL / NET: -200 mL        PHYSICAL EXAM:  HEAD:  Atraumatic, Normocephalic  NECK: Supple, No   JVD  CHEST/LUNG:   no     rales,     no,    rhonchi  HEART: Regular rate and rhythm;         murmur  ABDOMEN: Soft, Nontender, ;   EXTREMITIES:        edema  NEUROLOGY: lethargic    LABS:                        10.1   11.40 )-----------( 228      ( 25 Sep 2022 13:34 )             34.3     09-25    143  |  109<H>  |  36<H>  ----------------------------<  272<H>  4.7   |  23  |  0.59    Ca    9.0      25 Sep 2022 13:34    TPro  6.5  /  Alb  3.7  /  TBili  0.4  /  DBili  0.1  /  AST  20  /  ALT  18  /  AlkPhos  235<H>  09-25                    Thyroid Stimulating Hormone, Serum: 0.32 uIU/mL (09-24 @ 11:34)          Consultant(s) Notes Reviewed:      Care Discussed with Consultants/Other Providers:

## 2022-09-27 NOTE — PROGRESS NOTE ADULT - SUBJECTIVE AND OBJECTIVE BOX
CARDIOLOGY     PROGRESS  NOTE   ________________________________________________    CHIEF COMPLAINT:Patient is a 87y old  Female who presents with a chief complaint of SOB (27 Sep 2022 06:51)  no complain.  	  REVIEW OF SYSTEMS:  CONSTITUTIONAL: No fever, weight loss, or fatigue  EYES: No eye pain, visual disturbances, or discharge  ENT:  No difficulty hearing, tinnitus, vertigo; No sinus or throat pain  NECK: No pain or stiffness  RESPIRATORY: No cough, wheezing, chills or hemoptysis;+ Shortness of Breath  CARDIOVASCULAR: No chest pain, palpitations, passing out, dizziness, or leg swelling  GASTROINTESTINAL: No abdominal or epigastric pain. No nausea, vomiting, or hematemesis; No diarrhea or constipation. No melena or hematochezia.  GENITOURINARY: No dysuria, frequency, hematuria, or incontinence  NEUROLOGICAL: No headaches, memory loss, loss of strength, numbness, or tremors  SKIN: No itching, burning, rashes, or lesions   LYMPH Nodes: No enlarged glands  ENDOCRINE: No heat or cold intolerance; No hair loss  MUSCULOSKELETAL: No joint pain or swelling; No muscle, back, or extremity pain  PSYCHIATRIC: No depression, anxiety, mood swings, or difficulty sleeping  HEME/LYMPH: No easy bruising, or bleeding gums  ALLERGY AND IMMUNOLOGIC: No hives or eczema	    [ ] All others negative	  [ ] Unable to obtain    PHYSICAL EXAM:  T(C): 36.7 (09-27-22 @ 04:13), Max: 36.9 (09-26-22 @ 11:03)  HR: 98 (09-27-22 @ 04:13) (95 - 113)  BP: 112/62 (09-27-22 @ 04:13) (98/55 - 134/68)  RR: 20 (09-27-22 @ 04:13) (20 - 21)  SpO2: 98% (09-27-22 @ 04:13) (98% - 99%)  Wt(kg): --  I&O's Summary    26 Sep 2022 07:01  -  27 Sep 2022 07:00  --------------------------------------------------------  IN: 0 mL / OUT: 200 mL / NET: -200 mL        Appearance: Normal	  HEENT:   Normal oral mucosa, PERRL, EOMI	  Lymphatic: No lymphadenopathy  Cardiovascular: Normal S1 S2, No JVD, + murmurs, No edema  Respiratory: rhonchi  Psychiatry: A & O x 3, Mood & affect appropriate  Gastrointestinal:  Soft, Non-tender, + BS	  Skin: No rashes, No ecchymoses, No cyanosis	  Neurologic: Non-focal  Extremities: Normal range of motion, No clubbing, cyanosis or edema  Vascular: Peripheral pulses palpable 2+ bilaterally    MEDICATIONS  (STANDING):  albuterol/ipratropium for Nebulization 3 milliLiter(s) Nebulizer every 6 hours  allopurinol 100 milliGRAM(s) Oral daily  anastrozole 1 milliGRAM(s) Oral daily  atorvastatin 20 milliGRAM(s) Oral at bedtime  bisacodyl 5 milliGRAM(s) Oral at bedtime  buDESOnide    Inhalation Suspension 0.5 milliGRAM(s) Inhalation every 12 hours  chlorhexidine 2% Cloths 1 Application(s) Topical <User Schedule>  dextrose 50% Injectable 25 Gram(s) IV Push once  dextrose 50% Injectable 12.5 Gram(s) IV Push once  dextrose 50% Injectable 25 Gram(s) IV Push once  dextrose Oral Gel 15 Gram(s) Oral once  digoxin  Injectable 125 MICROGram(s) IV Push daily  diltiazem    Tablet 30 milliGRAM(s) Enteral Tube every 6 hours  glucagon  Injectable 1 milliGRAM(s) IntraMuscular once  guaiFENesin Oral Liquid (Sugar-Free) 300 milliGRAM(s) Oral every 6 hours  insulin lispro (ADMELOG) corrective regimen sliding scale   SubCutaneous every 6 hours  insulin NPH human recombinant 7 Unit(s) SubCutaneous every 6 hours  levETIRAcetam  Solution 750 milliGRAM(s) Oral two times a day  levothyroxine 75 MICROGram(s) Oral daily  melatonin 3 milliGRAM(s) Oral at bedtime  methylPREDNISolone sodium succinate Injectable 20 milliGRAM(s) IV Push every 8 hours  rivaroxaban 20 milliGRAM(s) Oral with dinner  senna 2 Tablet(s) Oral at bedtime      TELEMETRY: 	    ECG:  	  RADIOLOGY:  OTHER: 	  	  LABS:	 	    CARDIAC MARKERS:                                10.1   11.40 )-----------( 228      ( 25 Sep 2022 13:34 )             34.3     09-25    143  |  109<H>  |  36<H>  ----------------------------<  272<H>  4.7   |  23  |  0.59    Ca    9.0      25 Sep 2022 13:34    TPro  6.5  /  Alb  3.7  /  TBili  0.4  /  DBili  0.1  /  AST  20  /  ALT  18  /  AlkPhos  235<H>  09-25    proBNP: Serum Pro-Brain Natriuretic Peptide: 461 pg/mL (09-08 @ 20:56)    Lipid Profile:   HgA1c:   TSH: Thyroid Stimulating Hormone, Serum: 0.32 uIU/mL (09-24 @ 11:34)  Thyroid Stimulating Hormone, Serum: 0.30 uIU/mL (09-24 @ 11:34)          Assessment and plan  ---------------------------  86F w/ extensive hx including severe tracheobronchomalacia (c/b hypoxia 2/2 mucous plugging and recurrent L lung collapse), 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 Very limited hx obtained from pt given mental status, dyspnea, and use of BIPAP. Unable to reach sonSy. History obtained from staff/chart review.    Per ED staff who saw pt when she first arrived, pt was notably dyspneic with increased WOB and wheezing. Supposedly missed use of nightly BIPAP at rehab. Reportedly denied fever and cough. On encounter for admission, pt with BIPAP mask on, appearing slightly lethargic and mildly dyspneic, but was able to answer some questions. Pt endorsed having SOB. Denied pain. Seemed to believe initially that she was at [facility]? but was difficult to understand her responses.    Of note, pt has multiple recent admissions with similar presentation. Most recently was hospitalized from 8/28/22-9/8/22 for SOB, treated with airway clearance and completed 5-day course of Zosyn for empiric coverage of PNA (though per Pulm, unlikely to have PNA). Course c/b PEG displacement and subsequent reinsertion by IR on 8/30. In addition to her usual PEG feeds, pt was also started on puree diet with moderately thickened fluids for pleasure feeds. Also was started on anastrozole for most likely dx of metastatic breast cancer to bones (pt was unable to tolerate further cancer workup of spine lesions). Pt remained full code during recent admissions.  pt with metastatic ca to the bone with sob sec to obesity and underlying lung and cardiac disease  increase sob sec to missing BiPAP at night  may consider pulmonary eval  may consider hospice care/ palliative care  continue Diltiazem  pt will need home o2 and Bipap at night  discussed with family at the bed side   dvt prophylaxis high risk on xarelto  pt with bone mets, no further nieto/ onc noted  on steroid dose has increased   a.fib on 09/23/increase Cardizem dose, will add digoxin  may increase Xarelto dose sec to a.fib  add digoxin/converted to NSR

## 2022-09-27 NOTE — PROGRESS NOTE ADULT - ASSESSMENT
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 She also has a history of HTN, HLD, CAD s/p MI with preserved LVEF and moderate aortic stenosis. The patient was admitted to [facility] in December 2021 with fever and abdominal pain due to perforated appendicitis. She was treated with tube drainage and antibiotics for a polymicrobial infection. Hospital course was complicated by respiratory failure due to mucous plugging with complete collapse of the left lung. She underwent several bronchoscopies for pulmonary toilet and was found to have severe tracheobronchomalacia. Her respiratory status has remained "stable" while at [facility] on nebulized bronchodilators and hypertonic saline and without nocturnal NIV. She was admitted in March with hematochezia. The left lower lobe was well aerated on a CT scan of the abdomen and pelvis with only bibasilar subsegmental atelectasis - there was scattered sigmoid diverticulosis without evidence of diverticulitis - interval decrease in size of a fluid collection in the region of the previously perforated appendicitis measuring 2.8 x 1.5 cm previously measuring 4.0 x 2.2 cm - slight increase in mild adjacent inflammatory change. The GI bleed was treated conservatively.  She was admitted on 8/29 with shortness of breath in the setting of back, neck and left lower extremity pain. She required NIV for mild acute hypercapnic respiratory failure that quickly resolved. Her respiratory status remained stable on room air and on her usual nebs and mucolytics. She was started on a puree diet with moderately thickened fluids in addition to PEG feeds following evaluation by the speech and swallow team. She was started on anastrazole for likely metastatic breast cancer to the bone. On the day of discharge, the patient was quite anxious with shortness of breath, chest congestion and wheeze - she was making a grunting noise with expiration. She was returned from [facility] that evening. Currently she is tachypneic and using accessory muscles of respiration. She has an audible wheeze and cough productive of scant sputum. No fevers, chills or sweats. No chest pain/pressure or palpitations.     the patient has new onset bronchospasm with increased work of breathing and worsening left lower lobe atelectasis -  she has severe tracheobronchomalacia that has resulted in mucous plugging with complete left lung collapse requiring several bronchoscopies for pulmonary toilet in the past - the patient was felt not to be a candidate for surgery for the tracheobronchomalacia and stenting was felt to have more risk than benefit     metastatic breast cancer    9/19 -  found to be COVID positive on discharge [facility]; awake and alert; now with marked worsening of shortness of breath and using her accessory muscles of respiration; severe chest congestion and wheeze exacerbated by neck flexion; increasing pCO2, WBC and lactate; occasional cough productive of scant sputum; no fevers, chills or sweats; no chest pain/pressure or palpitations; back on PEG feeds; switched from nebs to inhalers yesterday as Dr. Dexter was informed by nursing that COVID positive patients cannot receive nebs at [facility]    9/21 - spent the day on AVAPS -> removed this AM with somewhat less increased work of breathing and decreased chest congestion and wheeze; VBG not sent this AM; in airborne and contact isolation due to hospital acquired COVID infection; awake and alert; occasional cough productive of scant sputum; no fevers, chills or sweats; no chest pain/pressure or palpitations;     9/23 - off AVAPS this AM - laying almost flat in bed with neck and back flexed; increased work of breathing; in airborne and contact isolation due to hospital acquired COVID infection; awake and alert; begging for lemonade; occasional cough productive of scant sputum; improving chest congestion or wheeze; no fevers, chills or sweats; no chest pain/pressure or palpitations; receiving PEG feeds when off NIV;    9/26 -  again laying almost flat in bed with neck and back flexed; decreased work of breathing now on a nasal canula during the day and AVAPS at night; airborne and contact isolation being discontinued after treatment for a hospital acquired COVID infection; awake and alert asking for something to eat and drink; occasional cough productive of scant sputum; improving chest congestion and wheeze; no fevers, chills or sweats; no chest pain/pressure or palpitations; receiving PEG feeds      PLAN/RECOMMENDATIONS:    the patient's head needs to be elevated greater than 45 degrees at all times  oxygen supplementation via a nasal canula during the day to keep saturation greater than 92%   will convert AVAPS to BIPAP which is available at [facility] for sleep  ABG 9/21 -> 7.51/33/158/100%% - respiratory alkalosis on AVAPS -> VBG ordered for today not yet availiable  has completed a 5 day course of remdesivir  solumedrol 20mg IV q8h  albuterol/atrovent nebs q6h  pulmicort 0.5mg nebs q12h  guaifenesin 300mg 4 times daily via PEG  incentive spirometry   acapella device   CT scan 8/31 -> multiple lytic lesions are seen throughout the axial and appendicular skeleton, some of which appear increased in size from CT abdomen pelvis 3/16/2022 when evaluated in retrospect - a few lytic lesions involve the left intertrochanteric region and right acetabulum  cardiology follow-up    started on digoxin and continues on diltiazem after a bout of atrial fibrillation now back in NSR  oncology follow-up noted    s/p right simple mastectomy 4/2019 for breast cancer but did not receive adjuvant hormonal therapy    it is likely that the patient has metastatic breast cancer    started on anastrozole as the patient could not tolerate a MRI and is not a candidate for bone biopsy  treatment of HTN, HLD, CAD, aortic stenosis per cardiology  DVT prophylaxis - xarelto  glucose control  bowel regimen  analgesics  keppra    Will follow with you. Plan of care discussed with the patient at bedside and the dedicated floor NP.    Kennedy Marcano MD, [facility]  [phone]  Pulmonary Medicine

## 2022-09-27 NOTE — PROGRESS NOTE ADULT - SUBJECTIVE AND OBJECTIVE BOX
Chief complaint    Patient is a 87y old  Female who presents with a chief complaint of SOB (27 Sep 2022 10:24)   Review of systems  Patient in bed, appears comfortable.    Labs and Fingersticks  CAPILLARY BLOOD GLUCOSE      POCT Blood Glucose.: 118 mg/dL (27 Sep 2022 05:21)  POCT Blood Glucose.: 128 mg/dL (27 Sep 2022 00:06)  POCT Blood Glucose.: 242 mg/dL (26 Sep 2022 17:36)  POCT Blood Glucose.: 233 mg/dL (26 Sep 2022 12:09)      Anion Gap, Serum: 11 (09-25 @ 13:34)      Calcium, Total Serum: 9.0 (09-25 @ 13:34)  Albumin, Serum: 3.7 (09-25 @ 13:34)    Alanine Aminotransferase (ALT/SGPT): 18 (09-25 @ 13:34)  Alkaline Phosphatase, Serum: 235 *H* (09-25 @ 13:34)  Aspartate Aminotransferase (AST/SGOT): 20 (09-25 @ 13:34)        09-25    143  |  109<H>  |  36<H>  ----------------------------<  272<H>  4.7   |  23  |  0.59    Ca    9.0      25 Sep 2022 13:34    TPro  6.5  /  Alb  3.7  /  TBili  0.4  /  DBili  0.1  /  AST  20  /  ALT  18  /  AlkPhos  235<H>  09-25                        10.1   11.40 )-----------( 228      ( 25 Sep 2022 13:34 )             34.3     Medications  MEDICATIONS  (STANDING):  albuterol/ipratropium for Nebulization 3 milliLiter(s) Nebulizer every 6 hours  allopurinol 100 milliGRAM(s) Oral daily  anastrozole 1 milliGRAM(s) Oral daily  atorvastatin 20 milliGRAM(s) Oral at bedtime  bisacodyl 5 milliGRAM(s) Oral at bedtime  buDESOnide    Inhalation Suspension 0.5 milliGRAM(s) Inhalation every 12 hours  chlorhexidine 2% Cloths 1 Application(s) Topical <User Schedule>  dextrose 50% Injectable 25 Gram(s) IV Push once  dextrose 50% Injectable 12.5 Gram(s) IV Push once  dextrose 50% Injectable 25 Gram(s) IV Push once  dextrose Oral Gel 15 Gram(s) Oral once  digoxin  Injectable 125 MICROGram(s) IV Push daily  diltiazem    Tablet 30 milliGRAM(s) Enteral Tube every 6 hours  glucagon  Injectable 1 milliGRAM(s) IntraMuscular once  guaiFENesin Oral Liquid (Sugar-Free) 300 milliGRAM(s) Oral every 6 hours  insulin lispro (ADMELOG) corrective regimen sliding scale   SubCutaneous every 6 hours  insulin NPH human recombinant 7 Unit(s) SubCutaneous every 6 hours  levETIRAcetam  Solution 750 milliGRAM(s) Oral two times a day  levothyroxine 75 MICROGram(s) Oral daily  melatonin 3 milliGRAM(s) Oral at bedtime  methylPREDNISolone sodium succinate Injectable 20 milliGRAM(s) IV Push every 8 hours  rivaroxaban 20 milliGRAM(s) Oral with dinner  senna 2 Tablet(s) Oral at bedtime      Physical Exam  General: Patient comfortable in bed  Vital Signs Last 12 Hrs  T(F): 98.1 (09-27-22 @ 04:13), Max: 98.1 (09-27-22 @ 04:13)  HR: 98 (09-27-22 @ 04:13) (98 - 101)  BP: 112/62 (09-27-22 @ 04:13) (112/62 - 112/62)  BP(mean): --  RR: 20 (09-27-22 @ 04:13) (20 - 20)  SpO2: 98% (09-27-22 @ 04:13) (98% - 98%)  Neck: No palpable thyroid nodules.  CVS: S1S2, No murmurs  Respiratory: No wheezing, no crepitations  GI: Abdomen soft, bowel sounds positive  Musculoskeletal:  edema lower extremities.     Diagnostics    Free Thyroxine, Serum: AM Sched. Collection: 24-Sep-2022 06:00  Cancel Reason: Dup Cancel (09-23 @ 12:07)  Thyroid Stimulating Hormone, Serum: AM Sched. Collection: 24-Sep-2022 06:00 (09-23 @ 12:07)  A1C with Estimated Average Glucose: Routine (09-23 @ 12:06)  A1C with Estimated Average Glucose: Routine  Cancel Reason: Lab Operations Cancel (09-23 @ 11:58)  A1C with Estimated Average Glucose: Routine (09-23 @ 10:31)  Free Thyroxine, Serum: AM Sched. Collection: 24-Sep-2022 06:00 (09-23 @ 09:23)  Thyroid Stimulating Hormone, Serum: AM Sched. Collection: 24-Sep-2022 06:00 (09-23 @ 09:23)

## 2022-09-27 NOTE — PROGRESS NOTE ADULT - ASSESSMENT
Assessment  DMT2: 87y Female with DM T2 with hyperglycemia, A1C 7%, on NPH insulin and coverage, blood sugars trending down, no hypoglycemias, on peg tube feeds, remains on steroids.  Covid: on medications, stable, monitored.  Hypothyroidism: On Synthroid 75 mcg po daily, ?compliance, euthyroid.  HLD: on statin.        Jeanie Gao MD  Cell: [phone]  Office: [phone]

## 2022-09-28 NOTE — PROGRESS NOTE ADULT - ASSESSMENT
Assessment  DMT2: 87y Female with DM T2 with hyperglycemia, A1C 7%, on NPH insulin and coverage, blood sugars trending within overall acceptable range, no hypoglycemias, on peg tube feeds.  Covid: on medications, stable, monitored.  Hypothyroidism: On Synthroid 75 mcg po daily, ?compliance, euthyroid.  HLD: on statin.        Jeanie Gao MD  Cell: [phone]  Office: [phone]               Assessment  DMT2: 87y Female with DM T2 with hyperglycemia, A1C 7%, on NPH insulin and coverage, blood sugars trending within  overall acceptable range, no hypoglycemias, on peg tube feeds.  Covid: on medications, stable, monitored.  Hypothyroidism: On Synthroid 75 mcg po daily, ?compliance, euthyroid.  HLD: on statin.        Jeanie Gao MD  Cell: [phone]  Office: [phone]

## 2022-09-28 NOTE — PROGRESS NOTE ADULT - SUBJECTIVE AND OBJECTIVE BOX
Chief complaint  Patient is a 87y old  Female who presents with a chief complaint of SOB (28 Sep 2022 09:03)   Review of systems  Patient in bed, looks comfortable, no hypoglycemic episodes.    Labs and Fingersticks  CAPILLARY BLOOD GLUCOSE      POCT Blood Glucose.: 204 mg/dL (28 Sep 2022 11:44)  POCT Blood Glucose.: 164 mg/dL (28 Sep 2022 05:48)  POCT Blood Glucose.: 175 mg/dL (28 Sep 2022 00:13)  POCT Blood Glucose.: 130 mg/dL (27 Sep 2022 18:09)                                            9.3    12.76 )-----------( 179      ( 28 Sep 2022 11:25 )             32.5     Medications  MEDICATIONS  (STANDING):  albuterol/ipratropium for Nebulization 3 milliLiter(s) Nebulizer every 6 hours  allopurinol 100 milliGRAM(s) Oral daily  anastrozole 1 milliGRAM(s) Oral daily  atorvastatin 20 milliGRAM(s) Oral at bedtime  bisacodyl 5 milliGRAM(s) Oral at bedtime  buDESOnide    Inhalation Suspension 0.5 milliGRAM(s) Inhalation every 12 hours  chlorhexidine 2% Cloths 1 Application(s) Topical <User Schedule>  dextrose 50% Injectable 25 Gram(s) IV Push once  dextrose 50% Injectable 12.5 Gram(s) IV Push once  dextrose 50% Injectable 25 Gram(s) IV Push once  dextrose Oral Gel 15 Gram(s) Oral once  digoxin     Tablet 125 MICROGram(s) Oral daily  diltiazem    Tablet 30 milliGRAM(s) Enteral Tube every 6 hours  glucagon  Injectable 1 milliGRAM(s) IntraMuscular once  guaiFENesin Oral Liquid (Sugar-Free) 300 milliGRAM(s) Oral every 6 hours  insulin lispro (ADMELOG) corrective regimen sliding scale   SubCutaneous every 6 hours  insulin NPH human recombinant 7 Unit(s) SubCutaneous every 6 hours  levETIRAcetam  Solution 750 milliGRAM(s) Oral two times a day  levothyroxine 75 MICROGram(s) Oral daily  melatonin 3 milliGRAM(s) Oral at bedtime  prednisoLONE    3 mG/mL Solution (ORAPRED) 45 milliGRAM(s) Oral daily  rivaroxaban 20 milliGRAM(s) Oral with dinner  senna 2 Tablet(s) Oral at bedtime      Physical Exam  General: Patient comfortable in bed  Vital Signs Last 12 Hrs  T(F): 97.3 (09-28-22 @ 11:15), Max: 98.4 (09-28-22 @ 05:00)  HR: 99 (09-28-22 @ 12:25) (91 - 99)  BP: 118/88 (09-28-22 @ 12:25) (101/49 - 118/88)  BP(mean): --  RR: 20 (09-28-22 @ 12:25) (20 - 21)  SpO2: 95% (09-28-22 @ 12:25) (95% - 99%)  Neck: No palpable thyroid nodules.  CVS: S1S2, No murmurs  Respiratory: No wheezing, no crepitations  GI: Abdomen soft, bowel sounds positive  Musculoskeletal:  edema lower extremities.   Skin: No skin rashes, no ecchymosis    Diagnostics             Chief complaint  Patient is a 87y old  Female who presents with a chief complaint of SOB (28 Sep 2022 09:03)   Review of systems  Patient in bed, looks comfortable, no hypoglycemic episodes.    Labs and Fingersticks  CAPILLARY BLOOD GLUCOSE      POCT Blood Glucose.: 204 mg/dL (28 Sep 2022 11:44)  POCT Blood Glucose.: 164 mg/dL (28 Sep 2022 05:48)  POCT Blood Glucose.: 175 mg/dL (28 Sep 2022 00:13)  POCT Blood Glucose.: 130 mg/dL (27 Sep 2022 18:09)                                            9.3    12.76 )-----------( 179      ( 28 Sep 2022 11:25 )             32.5     Medications  MEDICATIONS  (STANDING):  albuterol/ipratropium for Nebulization 3 milliLiter(s) Nebulizer every 6 hours  allopurinol 100 milliGRAM(s) Oral daily  anastrozole 1 milliGRAM(s) Oral daily  atorvastatin 20 milliGRAM(s) Oral at bedtime  bisacodyl 5 milliGRAM(s) Oral at bedtime  buDESOnide    Inhalation Suspension 0.5 milliGRAM(s) Inhalation every 12 hours  chlorhexidine 2% Cloths 1 Application(s) Topical <User Schedule>  dextrose 50% Injectable 25 Gram(s) IV Push once  dextrose 50% Injectable 12.5 Gram(s) IV Push once  dextrose 50% Injectable 25 Gram(s) IV Push once  dextrose Oral Gel 15 Gram(s) Oral once  digoxin     Tablet 125 MICROGram(s) Oral daily  diltiazem    Tablet 30 milliGRAM(s) Enteral Tube every 6 hours  glucagon  Injectable 1 milliGRAM(s) IntraMuscular once  guaiFENesin Oral Liquid (Sugar-Free) 300 milliGRAM(s) Oral every 6 hours  insulin lispro (ADMELOG) corrective regimen sliding scale   SubCutaneous every 6 hours  insulin NPH human recombinant 7 Unit(s) SubCutaneous every 6 hours  levETIRAcetam  Solution 750 milliGRAM(s) Oral two times a day  levothyroxine 75 MICROGram(s) Oral daily  melatonin 3 milliGRAM(s) Oral at bedtime  prednisoLONE    3 mG/mL Solution (ORAPRED) 45 milliGRAM(s) Oral daily  rivaroxaban 20 milliGRAM(s) Oral with dinner  senna 2 Tablet(s) Oral at bedtime      Physical Exam  General: Patient comfortable in bed  Vital Signs Last 12 Hrs  T(F): 97.3 (09-28-22 @ 11:15), Max: 98.4 (09-28-22 @ 05:00)  HR: 99 (09-28-22 @ 12:25) (91 - 99)  BP: 118/88 (09-28-22 @ 12:25) (101/49 - 118/88)  BP(mean): --  RR: 20 (09-28-22 @ 12:25) (20 - 21)  SpO2: 95% (09-28-22 @ 12:25) (95% - 99%)  Neck: No palpable thyroid nodules.  CVS: S1S2, No murmurs  Respiratory: No wheezing, no crepitations  GI: Abdomen soft, bowel sounds positive  Musculoskeletal:  edema lower extremities.   Skin: No skin rashes, no ecchymosis    Diagnostics

## 2022-09-28 NOTE — PROGRESS NOTE ADULT - ASSESSMENT
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 She also has a history of HTN, HLD, CAD s/p MI with preserved LVEF and moderate aortic stenosis. The patient was admitted to [facility] in December 2021 with fever and abdominal pain due to perforated appendicitis. She was treated with tube drainage and antibiotics for a polymicrobial infection. Hospital course was complicated by respiratory failure due to mucous plugging with complete collapse of the left lung. She underwent several bronchoscopies for pulmonary toilet and was found to have severe tracheobronchomalacia. Her respiratory status has remained "stable" while at [facility] on nebulized bronchodilators and hypertonic saline and without nocturnal NIV. She was admitted in March with hematochezia. The left lower lobe was well aerated on a CT scan of the abdomen and pelvis with only bibasilar subsegmental atelectasis - there was scattered sigmoid diverticulosis without evidence of diverticulitis - interval decrease in size of a fluid collection in the region of the previously perforated appendicitis measuring 2.8 x 1.5 cm previously measuring 4.0 x 2.2 cm - slight increase in mild adjacent inflammatory change. The GI bleed was treated conservatively.  She was admitted on 8/29 with shortness of breath in the setting of back, neck and left lower extremity pain. She required NIV for mild acute hypercapnic respiratory failure that quickly resolved. Her respiratory status remained stable on room air and on her usual nebs and mucolytics. She was started on a puree diet with moderately thickened fluids in addition to PEG feeds following evaluation by the speech and swallow team. She was started on anastrazole for likely metastatic breast cancer to the bone. On the day of discharge, the patient was quite anxious with shortness of breath, chest congestion and wheeze - she was making a grunting noise with expiration. She was returned from [facility] that evening. Currently she is tachypneic and using accessory muscles of respiration. She has an audible wheeze and cough productive of scant sputum. No fevers, chills or sweats. No chest pain/pressure or palpitations.     the patient has new onset bronchospasm with increased work of breathing and worsening left lower lobe atelectasis -  she has severe tracheobronchomalacia that has resulted in mucous plugging with complete left lung collapse requiring several bronchoscopies for pulmonary toilet in the past - the patient was felt not to be a candidate for surgery for the tracheobronchomalacia and stenting was felt to have more risk than benefit     metastatic breast cancer    9/19 -  found to be COVID positive on discharge [facility]; awake and alert; now with marked worsening of shortness of breath and using her accessory muscles of respiration; severe chest congestion and wheeze exacerbated by neck flexion; increasing pCO2, WBC and lactate; occasional cough productive of scant sputum; no fevers, chills or sweats; no chest pain/pressure or palpitations; back on PEG feeds; switched from nebs to inhalers yesterday as Dr. Dexter was informed by nursing that COVID positive patients cannot receive nebs at [facility]    9/21 - spent the day on AVAPS -> removed this AM with somewhat less increased work of breathing and decreased chest congestion and wheeze; VBG not sent this AM; in airborne and contact isolation due to hospital acquired COVID infection; awake and alert; occasional cough productive of scant sputum; no fevers, chills or sweats; no chest pain/pressure or palpitations;     9/23 - off AVAPS this AM - laying almost flat in bed with neck and back flexed; increased work of breathing; in airborne and contact isolation due to hospital acquired COVID infection; awake and alert; begging for lemonade; occasional cough productive of scant sputum; improving chest congestion or wheeze; no fevers, chills or sweats; no chest pain/pressure or palpitations; receiving PEG feeds when off NIV;    9/26 -  again laying almost flat in bed with neck and back flexed; decreased work of breathing now on a nasal canula during the day and AVAPS at night; airborne and contact isolation being discontinued after treatment for a hospital acquired COVID infection; awake and alert asking for something to eat and drink; occasional cough productive of scant sputum; improving chest congestion and wheeze; no fevers, chills or sweats; no chest pain/pressure or palpitations; receiving PEG feeds      PLAN/RECOMMENDATIONS:    the patient's head needs to be elevated greater than 45 degrees at all times  repeat chest CT  oxygen supplementation via a nasal canula during the day to keep saturation greater than 92% - currently on a 3lpm nasal canula  nocturnal BIPAP to prevent atelectasis for now  VBG 9/27 -> 7.42/45/61/90.4% - resolved respiratory acidosis  has completed a 5 day course of remdesivir  transition solumedrol -> prednisolone 45mg via PEG  continue albuterol/atrovent nebs q6h  continue pulmicort 0.5mg nebs q12h  continue guaifenesin 300mg 4 times daily via PEG  incentive spirometry   acapella device   CT scan 8/31 -> multiple lytic lesions are seen throughout the axial and appendicular skeleton, some of which appear increased in size from CT abdomen pelvis 3/16/2022 when evaluated in retrospect - a few lytic lesions involve the left intertrochanteric region and right acetabulum  oncology follow-up noted    s/p right simple mastectomy 4/2019 for breast cancer but did not receive adjuvant hormonal therapy    it is likely that the patient has metastatic breast cancer    started on anastrozole as the patient could not tolerate a MRI and is not a candidate for bone biopsy  cardiology follow-up     on digoxin and diltiazem after a bout of atrial fibrillation now back in NSR     management of HTN, HLD, CAD, aortic stenosis per cardiology  DVT prophylaxis - xarelto  glucose control  bowel regimen  analgesics  keppra    Will follow with you. Plan of care discussed with the patient at bedside and the dedicated floor NP. Hopeful discharge on Friday to Patricia Marcano MD, [facility] [place]  [phone]  Pulmonary Medicine

## 2022-09-28 NOTE — PROGRESS NOTE ADULT - SUBJECTIVE AND OBJECTIVE BOX
afberile  REVIEW OF SYSTEMS:  GEN: no fever,    no chills  RESP: no SOB,   no cough  CVS: no chest pain,   no palpitations  GI: no abdominal pain,   no nausea,   no vomiting,   no constipation,   no diarrhea  : no dysuria,   no frequency  NEURO: no headache,   no dizziness  PSYCH: no depression,   not anxious  Derm : no rash    MEDICATIONS  (STANDING):  albuterol/ipratropium for Nebulization 3 milliLiter(s) Nebulizer every 6 hours  allopurinol 100 milliGRAM(s) Oral daily  anastrozole 1 milliGRAM(s) Oral daily  atorvastatin 20 milliGRAM(s) Oral at bedtime  bisacodyl 5 milliGRAM(s) Oral at bedtime  buDESOnide    Inhalation Suspension 0.5 milliGRAM(s) Inhalation every 12 hours  chlorhexidine 2% Cloths 1 Application(s) Topical <User Schedule>  dextrose 50% Injectable 25 Gram(s) IV Push once  dextrose 50% Injectable 12.5 Gram(s) IV Push once  dextrose 50% Injectable 25 Gram(s) IV Push once  dextrose Oral Gel 15 Gram(s) Oral once  digoxin  Injectable 125 MICROGram(s) IV Push daily  diltiazem    Tablet 30 milliGRAM(s) Enteral Tube every 6 hours  glucagon  Injectable 1 milliGRAM(s) IntraMuscular once  guaiFENesin Oral Liquid (Sugar-Free) 300 milliGRAM(s) Oral every 6 hours  insulin lispro (ADMELOG) corrective regimen sliding scale   SubCutaneous every 6 hours  insulin NPH human recombinant 7 Unit(s) SubCutaneous every 6 hours  levETIRAcetam  Solution 750 milliGRAM(s) Oral two times a day  levothyroxine 75 MICROGram(s) Oral daily  melatonin 3 milliGRAM(s) Oral at bedtime  methylPREDNISolone sodium succinate Injectable 20 milliGRAM(s) IV Push every 8 hours  rivaroxaban 20 milliGRAM(s) Oral with dinner  senna 2 Tablet(s) Oral at bedtime    MEDICATIONS  (PRN):  acetaminophen     Tablet .. 650 milliGRAM(s) Oral every 6 hours PRN Temp greater or equal to 38C (100.4F), Mild Pain (1 - 3)  ALPRAZolam 0.25 milliGRAM(s) Oral every 8 hours PRN anxiety  polyethylene glycol 3350 17 Gram(s) Oral daily PRN Constipation      Vital Signs Last 24 Hrs  T(C): 36.9 (28 Sep 2022 05:00), Max: 36.9 (27 Sep 2022 11:27)  T(F): 98.4 (28 Sep 2022 05:00), Max: 98.5 (27 Sep 2022 11:27)  HR: 95 (28 Sep 2022 06:47) (86 - 106)  BP: 101/59 (28 Sep 2022 05:00) (101/59 - 122/78)  BP(mean): --  RR: 20 (28 Sep 2022 05:00) (20 - 20)  SpO2: 99% (28 Sep 2022 06:47) (91% - 99%)    Parameters below as of 28 Sep 2022 05:00  Patient On (Oxygen Delivery Method): nasal cannula      CAPILLARY BLOOD GLUCOSE      POCT Blood Glucose.: 164 mg/dL (28 Sep 2022 05:48)  POCT Blood Glucose.: 175 mg/dL (28 Sep 2022 00:13)  POCT Blood Glucose.: 130 mg/dL (27 Sep 2022 18:09)  POCT Blood Glucose.: 190 mg/dL (27 Sep 2022 11:50)    I&O's Summary    27 Sep 2022 07:01  -  28 Sep 2022 07:00  --------------------------------------------------------  IN: 0 mL / OUT: 350 mL / NET: -350 mL        PHYSICAL EXAM:  HEAD:  Atraumatic, Normocephalic  NECK: Supple, No   JVD  CHEST/LUNG:   no     rales,     no,    rhonchi  HEART: Regular rate and rhythm;         murmur  ABDOMEN: Soft, Nontender, ;   EXTREMITIES:        edema  NEUROLOGY:  arousable    LABS:                      09-27 @ 11:15  4.7  61      Thyroid Stimulating Hormone, Serum: 0.32 uIU/mL (09-24 @ 11:34)          Consultant(s) Notes Reviewed:      Care Discussed with Consultants/Other Providers:

## 2022-09-28 NOTE — PROGRESS NOTE ADULT - SUBJECTIVE AND OBJECTIVE BOX
CARDIOLOGY     PROGRESS  NOTE   ________________________________________________    CHIEF COMPLAINT:Patient is a 87y old  Female who presents with a chief complaint of SOB (28 Sep 2022 07:16)  no complain.  	  REVIEW OF SYSTEMS:  CONSTITUTIONAL: No fever, weight loss, or fatigue  EYES: No eye pain, visual disturbances, or discharge  ENT:  No difficulty hearing, tinnitus, vertigo; No sinus or throat pain  NECK: No pain or stiffness  RESPIRATORY: No cough, wheezing, chills or hemoptysis; + Shortness of Breath  CARDIOVASCULAR: No chest pain, palpitations, passing out, dizziness, or leg swelling  GASTROINTESTINAL: No abdominal or epigastric pain. No nausea, vomiting, or hematemesis; No diarrhea or constipation. No melena or hematochezia.  GENITOURINARY: No dysuria, frequency, hematuria, or incontinence  NEUROLOGICAL: No headaches, memory loss, loss of strength, numbness, or tremors  SKIN: No itching, burning, rashes, or lesions   LYMPH Nodes: No enlarged glands  ENDOCRINE: No heat or cold intolerance; No hair loss  MUSCULOSKELETAL: No joint pain or swelling; No muscle, back, or extremity pain  PSYCHIATRIC: No depression, anxiety, mood swings, or difficulty sleeping  HEME/LYMPH: No easy bruising, or bleeding gums  ALLERGY AND IMMUNOLOGIC: No hives or eczema	    [ ] All others negative	  [ ] Unable to obtain    PHYSICAL EXAM:  T(C): 36.9 (09-28-22 @ 05:00), Max: 36.9 (09-27-22 @ 11:27)  HR: 95 (09-28-22 @ 06:47) (86 - 106)  BP: 101/59 (09-28-22 @ 05:00) (101/59 - 122/78)  RR: 20 (09-28-22 @ 05:00) (20 - 20)  SpO2: 99% (09-28-22 @ 06:47) (91% - 99%)  Wt(kg): --  I&O's Summary    27 Sep 2022 07:01  -  28 Sep 2022 07:00  --------------------------------------------------------  IN: 0 mL / OUT: 350 mL / NET: -350 mL        Appearance: Normal	  HEENT:   Normal oral mucosa, PERRL, EOMI	  Lymphatic: No lymphadenopathy  Cardiovascular: Normal S1 S2, No JVD, +murmurs, No edema  Respiratory: rhonchi  Psychiatry: A & O x 3, Mood & affect appropriate  Gastrointestinal:  Soft, Non-tender, + BS	  Skin: No rashes, No ecchymoses, No cyanosis	  Neurologic: Non-focal  Extremities: Normal range of motion, No clubbing, cyanosis or edema  Vascular: Peripheral pulses palpable 2+ bilaterally    MEDICATIONS  (STANDING):  albuterol/ipratropium for Nebulization 3 milliLiter(s) Nebulizer every 6 hours  allopurinol 100 milliGRAM(s) Oral daily  anastrozole 1 milliGRAM(s) Oral daily  atorvastatin 20 milliGRAM(s) Oral at bedtime  bisacodyl 5 milliGRAM(s) Oral at bedtime  buDESOnide    Inhalation Suspension 0.5 milliGRAM(s) Inhalation every 12 hours  chlorhexidine 2% Cloths 1 Application(s) Topical <User Schedule>  dextrose 50% Injectable 25 Gram(s) IV Push once  dextrose 50% Injectable 12.5 Gram(s) IV Push once  dextrose 50% Injectable 25 Gram(s) IV Push once  dextrose Oral Gel 15 Gram(s) Oral once  digoxin  Injectable 125 MICROGram(s) IV Push daily  diltiazem    Tablet 30 milliGRAM(s) Enteral Tube every 6 hours  glucagon  Injectable 1 milliGRAM(s) IntraMuscular once  guaiFENesin Oral Liquid (Sugar-Free) 300 milliGRAM(s) Oral every 6 hours  insulin lispro (ADMELOG) corrective regimen sliding scale   SubCutaneous every 6 hours  insulin NPH human recombinant 7 Unit(s) SubCutaneous every 6 hours  levETIRAcetam  Solution 750 milliGRAM(s) Oral two times a day  levothyroxine 75 MICROGram(s) Oral daily  melatonin 3 milliGRAM(s) Oral at bedtime  methylPREDNISolone sodium succinate Injectable 20 milliGRAM(s) IV Push every 8 hours  rivaroxaban 20 milliGRAM(s) Oral with dinner  senna 2 Tablet(s) Oral at bedtime      TELEMETRY: 	    ECG:  	  RADIOLOGY:  OTHER: 	  	  LABS:	 	    CARDIAC MARKERS:                  proBNP: Serum Pro-Brain Natriuretic Peptide: 461 pg/mL (09-08 @ 20:56)    Lipid Profile:   HgA1c:   TSH: Thyroid Stimulating Hormone, Serum: 0.32 uIU/mL (09-24 @ 11:34)  Thyroid Stimulating Hormone, Serum: 0.30 uIU/mL (09-24 @ 11:34)          Assessment and plan  ---------------------------  86F w/ extensive hx including severe tracheobronchomalacia (c/b hypoxia 2/2 mucous plugging and recurrent L lung collapse), 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 Very limited hx obtained from pt given mental status, dyspnea, and use of BIPAP. Unable to reach sonSy. History obtained from staff/chart review.    Per ED staff who saw pt when she first arrived, pt was notably dyspneic with increased WOB and wheezing. Supposedly missed use of nightly BIPAP at rehab. Reportedly denied fever and cough. On encounter for admission, pt with BIPAP mask on, appearing slightly lethargic and mildly dyspneic, but was able to answer some questions. Pt endorsed having SOB. Denied pain. Seemed to believe initially that she was at [facility]? but was difficult to understand her responses.    Of note, pt has multiple recent admissions with similar presentation. Most recently was hospitalized from 8/28/22-9/8/22 for SOB, treated with airway clearance and completed 5-day course of Zosyn for empiric coverage of PNA (though per Pulm, unlikely to have PNA). Course c/b PEG displacement and subsequent reinsertion by IR on 8/30. In addition to her usual PEG feeds, pt was also started on puree diet with moderately thickened fluids for pleasure feeds. Also was started on anastrozole for most likely dx of metastatic breast cancer to bones (pt was unable to tolerate further cancer workup of spine lesions). Pt remained full code during recent admissions.  pt with metastatic ca to the bone with sob sec to obesity and underlying lung and cardiac disease  increase sob sec to missing BiPAP at night  may consider pulmonary eval  may consider hospice care/ palliative care  continue Diltiazem  pt will need home o2 and Bipap at night  discussed with family at the bed side   dvt prophylaxis high risk on xarelto  pt with bone mets, no further nieto/ onc noted  on steroid dose has increased   a.fib on 09/23/increase Cardizem dose, will add digoxin  may increase Xarelto dose sec to a.fib  add digoxin/converted to NSR  change dig to po

## 2022-09-28 NOTE — PROGRESS NOTE ADULT - SUBJECTIVE AND OBJECTIVE BOX
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 PICC line not placed due to respiratory distress    REVIEW OF SYSTEMS:  Constitutional: As per interval history  HEENT: Within normal limits  CV: As per interval history  Resp: As per interval history  GI: dysphagia  : Within normal limits  Musculoskeletal: Within normal limits  Skin: Within normal limits  Neurological: CVA - left sided plegia  Psychiatric: Within normal limits  Endocrine: Within normal limits  Hematologic/Lymphatic: Within normal limits  Allergic/Immunologic: Within normal limits    MEDICATIONS:     Pulmonary "  albuterol/ipratropium for Nebulization 3 milliLiter(s) Nebulizer every 6 hours  buDESOnide    Inhalation Suspension 0.5 milliGRAM(s) Inhalation every 12 hours  guaiFENesin Oral Liquid (Sugar-Free) 300 milliGRAM(s) Oral every 6 hours    Anti-microbials:    Cardiovascular:  digoxin     Tablet 125 MICROGram(s) Oral daily  diltiazem    Tablet 30 milliGRAM(s) Enteral Tube every 6 hours    Other:  allopurinol 100 milliGRAM(s) Oral daily  anastrozole 1 milliGRAM(s) Oral daily  atorvastatin 20 milliGRAM(s) Oral at bedtime  bisacodyl 5 milliGRAM(s) Oral at bedtime  chlorhexidine 2% Cloths 1 Application(s) Topical <User Schedule>  dextrose 50% Injectable 25 Gram(s) IV Push once  dextrose 50% Injectable 12.5 Gram(s) IV Push once  dextrose 50% Injectable 25 Gram(s) IV Push once  dextrose Oral Gel 15 Gram(s) Oral once  glucagon  Injectable 1 milliGRAM(s) IntraMuscular once  insulin lispro (ADMELOG) corrective regimen sliding scale   SubCutaneous every 6 hours  insulin NPH human recombinant 7 Unit(s) SubCutaneous every 6 hours  levETIRAcetam  Solution 750 milliGRAM(s) Oral two times a day  levothyroxine 75 MICROGram(s) Oral daily  melatonin 3 milliGRAM(s) Oral at bedtime  prednisoLONE    3 mG/mL Solution (ORAPRED) 45 milliGRAM(s) Oral daily  rivaroxaban 20 milliGRAM(s) Oral with dinner  senna 2 Tablet(s) Oral at bedtime    MEDICATIONS  (PRN):  acetaminophen     Tablet .. 650 milliGRAM(s) Oral every 6 hours PRN Temp greater or equal to 38C (100.4F), Mild Pain (1 - 3)  ALPRAZolam 0.25 milliGRAM(s) Oral every 8 hours PRN anxiety  polyethylene glycol 3350 17 Gram(s) Oral daily PRN Constipation      OBJECTIVE:    POCT Blood Glucose.: 204 mg/dL (28 Sep 2022 11:44)  POCT Blood Glucose.: 164 mg/dL (28 Sep 2022 05:48)  POCT Blood Glucose.: 175 mg/dL (28 Sep 2022 00:13)  POCT Blood Glucose.: 130 mg/dL (27 Sep 2022 18:09)      PHYSICAL EXAM:       ICU Vital Signs Last 24 Hrs  T(C): 36.3 (28 Sep 2022 11:15), Max: 36.9 (28 Sep 2022 05:00)  T(F): 97.3 (28 Sep 2022 11:15), Max: 98.4 (28 Sep 2022 05:00)  HR: 99 (28 Sep 2022 12:25) (86 - 103)  BP: 118/88 (28 Sep 2022 12:25) (101/49 - 118/88)  BP(mean): --  ABP: --  ABP(mean): --  RR: 20 (28 Sep 2022 12:25) (20 - 21)  SpO2: 95% (28 Sep 2022 12:25) (93% - 99%) on 3lpm nasal canula    General: Awake. Alert. Cooperative. No distress. Appears stated age. Obese. Laying flat in bed.  HEENT: Atraumatic. Normocephalic. Anicteric. Normal oral mucosa. PERRL. EOMI. Mallampati class IV airway. Poor dentition.  Neck: Supple. Trachea midline. Thyroid without enlargement/tenderness/nodules. No carotid bruit. No JVD. Short and wide. Neck flexed and chin resting on her anterior chest.  Cardiovascular: Regular rate and rhythm. S1 S2 normal. III/VI systolic murmur  Respiratory: Using accessory muscles of respiration. Minimal bilateral course breath sounds. Kyphosis. Poor chest wall excursion  Abdomen: Soft. Non-tender. Non-distended. No organomegaly. No masses. Normal bowel sounds. Obese. PEG.  Extremities: Warm to touch. No clubbing or cyanosis. No pedal edema. Large legs. Left leg contracted under the right leg  Pulses: 2+ peripheral pulses all extremities.	  Skin: Normal skin color. No rashes or lesions. No ecchymoses. No cyanosis. Warm to touch.  Lymph Nodes: Cervical, supraclavicular and axillary nodes normal  Neurological: Left lower extremity plegia with contraction. Left upper extremity is quite weak. A and O x 3    LABS:                          9.3    12.76 )-----------( 179      ( 28 Sep 2022 11:25 )             32.5     CBC    WBC  12.76 <==, 11.40 <==, 8.80 <==    Hemoglobin  9.3 <<==, 10.1 <<==, 8.7 <<==    Hematocrit  32.5 <==, 34.3 <==, 29.4 <==    Platelets  179 <==, 228 <==, 233 <==      143  |  109<H>  |  36<H>  ----------------------------<  272<H>    09-25  4.7   |  23  |  0.59      LYTES    sodium  143 <==, 142 <==    potassium   4.7 <==, 4.3 <==    chloride  109 <==, 109 <==    carbon dioxide  23 <==, 21 <==    =============================================================================================  RENAL FUNCTION:    Creatinine:   0.59  <<==, 0.59  <<==, 0.56  <<==    BUN:   36 <==, 31 <==    ============================================================================================    calcium   9.0 <==, 8.8 <==    ============================================================================================  LFTs    AST:   20 <== , 14 <== , 15 <==     ALT:  18  <== , 16  <== , 17  <==     AP:  235  <=, 216  <=, 218  <=    Bili:  0.4  <=, 0.5  <=, 0.4  <=    PT/INR - ( 24 Sep 2022 11:34 )   PT: 11.3 sec;   INR: 0.98 ratio      Venous Blood Gas:  09-27 @ 11:15  7.42/45/61/29/90.4  VBG Lactate: 2.3    ABG - ( 21 Sep 2022 14:19 )  pH: 7.51  /  pCO2: 33    /  pO2: 158   / HCO3: 26    / Base Excess: 3.6   /  SaO2: 100.0     Venous Blood Gas:  09-19 @ 09:30  7.34/53/50/29/80.0  VBG Lactate: 3.5    Venous Blood Gas:  09-18 @ 03:50  7.39/48/47/29/78.2  VBG Lactate: 1.7    Procalcitonin, Serum: 0.23 ng/mL (09-18 @ 09:59)    D-Dimer Assay, Quantitative (09.18.22 @ 09:59)   D-Dimer Assay, Quantitative: 196:    C-Reactive Protein, Serum (09.18.22 @ 09:59)   C-Reactive Protein, Serum: 5 mg/L     Ferritin, Serum in AM (09.18.22 @ 09:59)   Ferritin, Serum: 63 ng/mL     < from: TTE with Doppler (w/Cont) (01.21.22 @ 14:08) >    Patient name: FRANKI MEHTA  YOB: 1935   Age: 86 (F)   MR#: [id number]  Study Date: 1/21/2022  ------------------------------------------------------------------------  Dimensions:    Normal Values:  LA:     2.5    2.0 - 4.0 cm  Ao:     3.3    2.0 - 3.8 cm  SEPTUM: 1.6    0.6 - 1.2 cm  PWT:    0.9    0.6 - 1.1 cm  LVIDd:  3.6    3.0 - 5.6 cm  LVIDs:  2.5    1.8 - 4.0 cm  Derived variables:  LVMI: 77 g/m2  RWT: 0.50  Fractional short: 31 %  EF (Visual Estimate): 70-75 %  ------------------------------------------------------------------------  Conclusions:  1. Concentric left ventricular hypertrophy.  2. Hyperdynamic left ventricular systolic function.  Endocardial visualization enhanced with intravenous  injection of Ultrasonic Enhancing Agent (Definity).  3. The right ventricle is not well visualized; grossly  normal right ventricular systolic function.  Pt refused to proceed with exam.  Limited Doppler study.  No trans aortic gradients.  Unable to rule out endocarditis.  ------------------------------------------------------------------------  Confirmed on 1/21/2022 - 15:42:57 by COMPA Castillo  ------------------------------------------------------------------------  ---------------------------------------------------------------------------------------------------------------    MICROBIOLOGY:     COVID-19 PCR . (09.19.22 @ 18:31)   COVID-19 PCR: Detected:    Respiratory Viral Panel with COVID-19 by RYAN (09.08.22 @ 21:38)   Rapid RVP Result: [facility]   SARS-CoV-2: [facility]  This Respiratory Panel uses polymerase chain reaction (PCR) to detect for   adenovirus; coronavirus (HKU1, NL63, 229E, OC43); human metapneumovirus   (hMPV); human enterovirus/rhinovirus (Entero/RV); influenza A; influenza   A/H1; influenza A/H3; influenza A/H1-2009; influenza B; parainfluenza   viruses 1, 2, 3, 4; respiratory syncytial virus; Mycoplasma pneumoniae;   Chlamydophila pneumoniae; and SARS-CoV-2.     Culture - Blood (09.08.22 @ 20:39)   Specimen Source: .Blood Blood-Peripheral   Culture Results:   No growth to date.     Culture - Blood (09.08.22 @ 20:25)   Specimen Source: .Blood Blood-Peripheral   Culture Results:   No growth to date.     RADIOLOGY:  [x ] Chest radiographs reviewed and interpreted by me    EXAM:  XR CHEST PORTABLE ROUTINE 1V                          PROCEDURE DATE:  09/24/2022      FINDINGS:  Radiographic interpretation is severely limited by patient positioning.  Gastrostomy tube is noted.  The heart size cannot be accurately assessed in this projection.  No focalconsolidation.  There is no pneumothorax or pleural effusion.  No acute bony abnormality.    IMPRESSION:  Markedly limited exam. Grossly clear lungs.    AMY JARAMILLO MD; Resident Radiologist  This document has been electronically signed.  PRAVIN CASTILLO MD; Attending Radiologist  This document has been electronically signed. Sep 24 2022  1:25PM  ---------------------------------------------------------------------------------------------------------------    EXAM:  XR CHEST PORTABLE URGENT 1V                          PROCEDURE DATE:  09/19/2022      FINDINGS:  Radiographic interpretation is limited by patient positioning and   overlying artifact.  Gastrostomy tube is noted.  Left humeral fixation plate and screws are noted.  The heart size cannot be accurately assessed in this projection.  Left basilar hazy opacities  Low lung volumes bilaterally.  There is no pneumothorax or large pleural effusion.  No acute bony abnormality.    IMPRESSION:  Left basilar hazy opacity may represent atelectasis.    AMY JARAMILLO MD; Resident Radiologist  This document has been electronically signed.  DIANE CLARK MD; Attending Radiologist  This document has been electronically signed. Sep 19 2022  6:08PM  ---------------------------------------------------------------------------------------------------------------    EXAM:  XR CHEST PORTABLE URGENT 1V                          PROCEDURE DATE:  09/12/2022      FINDINGS:    Gastrostomy tube.  The heart is enlarged.  Patchy left mid and lower lung opacity is minimally increased. The right   lung is clear.  No pleural effusion or pneumothorax.    IMPRESSION:  Minimally increased left mid to lower lung atelectasis..    ROBSON PIZANO MD; Resident Radiology  This document has been electronically signed.  DIANE CLARK MD; Attending Radiologist  This document has been electronically signed. Sep 12 2022  2:46PM  --------------------------------------------------------------------------------------------------------------  EXAM:  CT CHEST                          PROCEDURE DATE:  09/14/2022      FINDINGS:    LUNGS AND AIRWAYS: Patent central airways.  Subsegmental atelectasis is   seen in the bilateral upper and lower lobes.  PLEURA: No pleural effusion.  MEDIASTINUM AND GIOVANA: No lymphadenopathy.  VESSELS: Calcifications of the aorta, aortic valve and coronary arteries.  HEART: Heart size is normal. No pericardial effusion.  CHEST WALL AND LOWER NECK: Within normal limits.  VISUALIZED UPPER ABDOMEN: Cholelithiasis.  BONES: Multiple lytic bone lesions are again seen including a destructive   lesion at the level of the T5 vertebral body that may extend into the   left neural foramen (3:42), unchanged from prior CT chest.    IMPRESSION:  Subsegmental atelectasis in the bilateral upper and lower lobes.    Lytic bone lesions as described above, unchanged from prior CT chest   8/29/2022. Recommend MR thoracic spine for further evaluation.     ALEXANDRIA COLLINS MD; Resident Radiologist  This document has been electronically signed.  HAY IRVIN MD; Attending Radiologist  This document has been electronically signed. Sep 14 2022  3:23PM  ---------------------------------------------------------------------------------------------------------------  EXAM:  DUPLEX SCAN EXT VEINS LOWER BI                          PROCEDURE DATE:  09/10/2022      IMPRESSION:  Limited study  No evidence of deep venous thrombosis in either lower extremity.  Limited visualization of the calf veins.    KEYLA ROMERO MD; Attending Radiologist  This document has been electronically signed. Sep 10 2022 10:42AM  ---------------------------------------------------------------------------------------------------------------  EXAM:  CT ABDOMEN AND PELVIS IC                          PROCEDURE DATE:  08/31/2022      IMPRESSION:    Multiple lytic lesions are seen throughout the axial and appendicular   skeleton, some of which appear increased in size from CT abdomen pelvis   3/16/2022 when evaluated in retrospect. A few lytic lesions involve the   left intertrochanteric region and right acetabulum.    Endometrial thickening. Recommend dedicated pelvic sonogram.    Cystic lesions are seen within the pancreas. Recommend a dedicated MRI on   a nonemergent basis.    SULY KENDRICK MD; Resident Radiology  This document has been electronically signed.  BRITTANI MALCOLM MD; Attending Radiologist  This document has been electronically signed. Sep  1 2022 11:01AM  ---------------------------------------------------------------------------------------------------------------  EXAM:  XR HUMERUS MIN 2 VIEWS RT                          PROCEDURE DATE:  08/30/2022      EXAM:  Internal/external rotation AP right humerus from 8/30/2022 at 0927.   Compared to appearance on previous day chest CT.    IMPRESSION:  Generalized osteopenia. Redemonstrated focal lytic lesion in proximal   medial humeral diaphysis with small area of permeative cortical   destruction. No additional radiographically appreciated suspicious lytic   or blastic lesions in remaining imaged regions.    No current fractures or dislocations.    Preserved shoulder and elbow joint spaces.    IV cannula with attached tubing overlies upper arm.    MINE NICE MD; Attending Radiologist  This document has been electronically signed. Aug 30 2022 10:39AM  ---------------------------------------------------------------------------------------------------------------  EXAM:  DUPLEX EXT VEINS UPPER LT                          PROCEDURE DATE:  09/01/2022      IMPRESSION:  No evidence of left upper extremity deep venous thrombosis.    FAWN FITZGERALD MD; Attending Radiologist  This document has been electronically signed. Sep  1 2022  1:10PM  ---------------------------------------------------------------------------------------------------------------  EXAM:  DUPLEX SCAN EXT VEINS LOWER BI                          PROCEDURE DATE:  08/31/2022      IMPRESSION:  No evidence of deep venous thrombosis in either lower extremity.    KEYLA ROMERO MD; Attending Radiologist  This document has been electronically signed. Aug 31 2022  7:59PM  ------------------------------------------------------

## 2022-09-28 NOTE — PROGRESS NOTE ADULT - ASSESSMENT
89    year old female    h/o hemorrhagic CVA, has  PEG,  HTN, MI,   HLD, gout, right ca  breast   right Ca breast. s/p mastectomy in 2019,  s/p  sentinel node  localization.  4/4,  were  negative  s/p rrt  . in past,  for hypoxia. cxr with complete  collapsed  of  left lung, needing broch,   s p  bronchoscopy , pt  with  tracheomalacia  and  collapsed  airways,  makes  this a  difficult  situation, as there is no good rx for this     h/o bacteremia. on   pna, perforated  appendicitis,  ?  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 this will not alter rx. pt  redvd  extended  course  of ab   on iv  vanco and ertapenem.  till  2/9/.22.        *  admitted with   presumed  resp  failure/ pt was  sent back from [facility], the same day   pt seen in er.  appears   at her naseline.  no  wheezing /  atousable    ENT eval w/ laryngoscopy notable for vocal cords mobile and intact, no edema, upper airway patent     hypertonic saline for airway clearance   cxr, no pna     *   s/p cva, has  PEG,  npo  ,   on  synthroid, Keppra  ,  *   HTN, on  cardizem,  per  card dr jamison  *  h/o  Ca breast. /, s/p  R  mastectomy  *   obesity, bmi  was   41, now  is  30   *     c/c left  leg contracture ,  remains  bed  bound. functional  paraplegia,  needs  assistance  for  all her  adl's   *  pt  with  h/o  tracheomalacia  and  collapsed  airways, propensity  for  lung collpse,   pt is  at risk for  re admissions     on nocturnal  bipap,  difficult  situation, a s there  is no  good  rx  for  pt's  recurrent lung collapse hypoxia     h/o  of  chronic    mild  tachycardia          *   CT chest. lytic  lesions, T  spine/  ribs/ humerus/  oncoloigy  d r ohri.  suspect  metastatic ca breast        son. guicho Dan/ oncologist  has  also  discussed  with  son,  futility of  pursuing  bx. a s pt  is  not an ideal   candidate  for  chemo    CT  A.p ,/ prelim ,  pelvic  mets  per  oncology,   suspect  metastatic ca  breast,  was  awaiting   mri  spine/  pt unable  to tolerate  mri   per oncology, no  further  intervention/  and on  Arimidex, now,   per d r ohri   obesity,  bed bound.  functional paraplegia    per  son,   rehab promptly  returned   to er/ as they  did not  have  a  bipap machine /  care cortn to f/.p, needs  24/7  O2/ nocturnal bipap   difficult  situation,  bed  bound. obesity. c/c  hypercarbia, needing , prn /  nocturnal bipap/  with intermittent tachypnea    now  is  covid +. on iv steroid/ was  wheezing/ remdissivir/, seen by  house  ID   pt  with  frequent episodes  of tachypnea. has  obstructive /restrictive   lung  disease ,  obesity/ RAQUEL/ hypercarbia   called son, mlple  times,  message  left/    pt  remains  at  her  baseline ,   verbal  at  times  Afib, on xarelto  20m mg qd    s/p tachycardia, on   cardizem  and digoxin/  remains on iv steroid  per  pulm    pt  with poor  access   and  IR  note  seen, pt unable to lie  supine   pt at  baseline,  has  never  been  able  to  lie  supine,  given all  her  co  morbidities, and pt  has  been  optimized   to  fullest  degree possible   defer  steroid  to pulm  very  difficult  situation       per  son, pt is  full code

## 2022-09-29 NOTE — PROGRESS NOTE ADULT - ASSESSMENT
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 She also has a history of HTN, HLD, CAD s/p MI with preserved LVEF and moderate aortic stenosis. The patient was admitted to [facility] in December 2021 with fever and abdominal pain due to perforated appendicitis. She was treated with tube drainage and antibiotics for a polymicrobial infection. Hospital course was complicated by respiratory failure due to mucous plugging with complete collapse of the left lung. She underwent several bronchoscopies for pulmonary toilet and was found to have severe tracheobronchomalacia. Her respiratory status has remained "stable" while at [facility] on nebulized bronchodilators and hypertonic saline and without nocturnal NIV. She was admitted in March with hematochezia. The left lower lobe was well aerated on a CT scan of the abdomen and pelvis with only bibasilar subsegmental atelectasis - there was scattered sigmoid diverticulosis without evidence of diverticulitis - interval decrease in size of a fluid collection in the region of the previously perforated appendicitis measuring 2.8 x 1.5 cm previously measuring 4.0 x 2.2 cm - slight increase in mild adjacent inflammatory change. The GI bleed was treated conservatively.  She was admitted on 8/29 with shortness of breath in the setting of back, neck and left lower extremity pain. She required NIV for mild acute hypercapnic respiratory failure that quickly resolved. Her respiratory status remained stable on room air and on her usual nebs and mucolytics. She was started on a puree diet with moderately thickened fluids in addition to PEG feeds following evaluation by the speech and swallow team. She was started on anastrazole for likely metastatic breast cancer to the bone. On the day of discharge, the patient was quite anxious with shortness of breath, chest congestion and wheeze - she was making a grunting noise with expiration. She was returned from [facility] that evening. Currently she is tachypneic and using accessory muscles of respiration. She has an audible wheeze and cough productive of scant sputum. No fevers, chills or sweats. No chest pain/pressure or palpitations.     the patient has new onset bronchospasm with increased work of breathing and worsening left lower lobe atelectasis -  she has severe tracheobronchomalacia that has resulted in mucous plugging with complete left lung collapse requiring several bronchoscopies for pulmonary toilet in the past - the patient was felt not to be a candidate for surgery for the tracheobronchomalacia and stenting was felt to have more risk than benefit     metastatic breast cancer    9/19 -  found to be COVID positive on discharge RVP; awake and alert; now with marked worsening of shortness of breath and using her accessory muscles of respiration; severe chest congestion and wheeze exacerbated by neck flexion; increasing pCO2, WBC and lactate; occasional cough productive of scant sputum; no fevers, chills or sweats; no chest pain/pressure or palpitations; back on PEG feeds; switched from nebs to inhalers yesterday as Dr. Dexter was informed by nursing that COVID positive patients cannot receive nebs at [facility]    9/21 - spent the day on AVAPS -> removed this AM with somewhat less increased work of breathing and decreased chest congestion and wheeze; VBG not sent this AM; in airborne and contact isolation due to hospital acquired COVID infection; awake and alert; occasional cough productive of scant sputum; no fevers, chills or sweats; no chest pain/pressure or palpitations;     9/23 - off AVAPS this AM - laying almost flat in bed with neck and back flexed; increased work of breathing; in airborne and contact isolation due to hospital acquired COVID infection; awake and alert; begging for lemonade; occasional cough productive of scant sputum; improving chest congestion or wheeze; no fevers, chills or sweats; no chest pain/pressure or palpitations; receiving PEG feeds when off NIV;    9/26 -  again laying almost flat in bed with neck and back flexed; decreased work of breathing now on a nasal canula during the day and AVAPS at night; airborne and contact isolation being discontinued after treatment for a hospital acquired COVID infection; awake and alert asking for something to eat and drink; occasional cough productive of scant sputum; improving chest congestion and wheeze; no fevers, chills or sweats; no chest pain/pressure or palpitations; receiving PEG feeds      PLAN/RECOMMENDATIONS:    the patient's head needs to be elevated greater than 45 degrees at all times  repeat chest CT -> patent central airways - stable subsegmental atelectasis within posterior upper lobes, lingula and basilar lower lobes - trace fluid within the left fissure - no significant pleural effusion or pneumothorax - new T8 compression deformity - redemonstration of T2 and T3 compression fracture - unchanged mild T11 wedge deformity - redemonstration of multiple lytic lesions in the thoracic spine - fixation plate and screws within the left proximal humerus - status post right-sided mastectomy  oxygen supplementation via a nasal canula during the day to keep saturation greater than 92% - currently on a 3lpm nasal canula -> taper as tolerated  continue nocturnal BIPAP to prevent atelectasis for now  VBG 9/27 -> 7.42/45/61/90.4% - resolved respiratory acidosis  has completed a 5 day course of remdesivir  prednisolone 45mg via PEG - taper as written - will need to adjust insulin accordingly  continue albuterol/atrovent nebs q6h  continue pulmicort 0.5mg nebs q12h  continue guaifenesin 300mg 4 times daily via PEG  incentive spirometry   acapella device   CT scan 8/31 -> multiple lytic lesions are seen throughout the axial and appendicular skeleton, some of which appear increased in size from CT abdomen pelvis 3/16/2022 when evaluated in retrospect - a few lytic lesions involve the left intertrochanteric region and right acetabulum  oncology follow-up noted    s/p right simple mastectomy 4/2019 for breast cancer but did not receive adjuvant hormonal therapy    it is likely that the patient has metastatic breast cancer    started on anastrozole as the patient could not tolerate a MRI and is not a candidate for bone biopsy  cardiology follow-up for management of HTN, HLD, CAD, aortic stenosis and atrial fibrillation     cardiac meds: digoxin/diltiazem/xarelto  DVT prophylaxis - xarelto  glucose control  bowel regimen  analgesics  keppra    Will follow with you. Plan of care discussed with the patient at bedside and the dedicated floor NP. Hopeful discharge tomorrow to [facility] if remains stable.    Kennedy Marcano MD, [facility]  [phone]  Pulmonary Medicine

## 2022-09-29 NOTE — PROGRESS NOTE ADULT - SUBJECTIVE AND OBJECTIVE BOX
[facility] - PROGRESS NOTE    CHIEF COMPLAINT: COVID infection; chronic hypoxic/hypercapnic respiratory failure; mucous plugging; atelectasis; tracheobronchomalacia; bronchospasm; weak cough; 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; PEG in place    INTERVAL HISTORY: again laying almost flat in bed with neck flexed onto the anterior chest wall; hoisted up again by myself with a PCA; no shortness of breath or hypoxemia on a 3lpm nasal canula when upright; no cough, sputum production, hemoptysis, chest congestion or wheeze; no fevers, chills or sweats; no chest pain/pressure or palpitations; tolerating ICE chips.     REVIEW OF SYSTEMS:  Constitutional: As per interval history  HEENT: Within normal limits  CV: As per interval history  Resp: As per interval history  GI: dysphagia  : Within normal limits  Musculoskeletal: Within normal limits  Skin: Within normal limits  Neurological: CVA - left sided plegia  Psychiatric: Within normal limits  Endocrine: Within normal limits  Hematologic/Lymphatic: Within normal limits  Allergic/Immunologic: Within normal limits    MEDICATIONS:     Pulmonary "  albuterol/ipratropium for Nebulization 3 milliLiter(s) Nebulizer every 6 hours  buDESOnide    Inhalation Suspension 0.5 milliGRAM(s) Inhalation every 12 hours  guaiFENesin Oral Liquid (Sugar-Free) 300 milliGRAM(s) Oral every 6 hours    Anti-microbials:    Cardiovascular:  digoxin     Tablet 125 MICROGram(s) Oral daily  diltiazem    Tablet 30 milliGRAM(s) Enteral Tube every 6 hours    Other:  allopurinol 100 milliGRAM(s) Oral daily  anastrozole 1 milliGRAM(s) Oral daily  atorvastatin 20 milliGRAM(s) Oral at bedtime  bisacodyl 5 milliGRAM(s) Oral at bedtime  chlorhexidine 2% Cloths 1 Application(s) Topical <User Schedule>  dextrose 50% Injectable 25 Gram(s) IV Push once  dextrose 50% Injectable 12.5 Gram(s) IV Push once  dextrose 50% Injectable 25 Gram(s) IV Push once  dextrose Oral Gel 15 Gram(s) Oral once  glucagon  Injectable 1 milliGRAM(s) IntraMuscular once  insulin lispro (ADMELOG) corrective regimen sliding scale   SubCutaneous every 6 hours  insulin NPH human recombinant 7 Unit(s) SubCutaneous every 6 hours  levETIRAcetam  Solution 750 milliGRAM(s) Oral two times a day  levothyroxine 75 MICROGram(s) Oral daily  melatonin 3 milliGRAM(s) Oral at bedtime  prednisoLONE    3 mG/mL Solution (ORAPRED) 45 milliGRAM(s) Oral daily  rivaroxaban 20 milliGRAM(s) Oral with dinner  senna 2 Tablet(s) Oral at bedtime    MEDICATIONS  (PRN):  acetaminophen     Tablet .. 650 milliGRAM(s) Oral every 6 hours PRN Temp greater or equal to 38C (100.4F), Mild Pain (1 - 3)  ALPRAZolam 0.25 milliGRAM(s) Oral every 8 hours PRN anxiety  polyethylene glycol 3350 17 Gram(s) Oral daily PRN Constipation      OBJECTIVE:    POCT Blood Glucose.: 145 mg/dL (29 Sep 2022 12:26)  POCT Blood Glucose.: 140 mg/dL (29 Sep 2022 06:01)  POCT Blood Glucose.: 255 mg/dL (29 Sep 2022 00:09)  POCT Blood Glucose.: 211 mg/dL (28 Sep 2022 18:00)    PHYSICAL EXAM:       ICU Vital Signs Last 24 Hrs  T(C): 36.7 (29 Sep 2022 10:57), Max: 36.7 (29 Sep 2022 04:37)  T(F): 98 (29 Sep 2022 10:57), Max: 98 (29 Sep 2022 04:37)  HR: 108 (29 Sep 2022 10:57) (79 - 108)  BP: 101/50 (29 Sep 2022 10:57) (101/50 - 126/85)  BP(mean): --  ABP: --  ABP(mean): --  RR: 19 (29 Sep 2022 10:57) (19 - 20)  SpO2: 98% (29 Sep 2022 10:57) (95% - 99%) on 3lpm nasal canula    General: Awake. Alert. Cooperative. No distress. Appears stated age. Obese. Laying flat in bed.  HEENT: Atraumatic. Normocephalic. Anicteric. Normal oral mucosa. PERRL. EOMI. Mallampati class IV airway. Poor dentition.  Neck: Supple. Trachea midline. Thyroid without enlargement/tenderness/nodules. No carotid bruit. No JVD. Short and wide. Neck flexed with chin resting on her anterior chest.  Cardiovascular: Regular rate and rhythm. S1 S2 normal. III/VI systolic murmur  Respiratory: Respirations unlaboured. Clear anteriorly. Kyphosis. Poor chest wall excursion  Abdomen: Soft. Non-tender. Non-distended. No organomegaly. No masses. Normal bowel sounds. Obese. PEG.  Extremities: Warm to touch. No clubbing or cyanosis. No pedal edema. Large legs. Left leg contracted under the right leg  Pulses: 2+ peripheral pulses all extremities.	  Skin: Normal skin color. No rashes or lesions. No ecchymoses. No cyanosis. Warm to touch.  Lymph Nodes: Cervical, supraclavicular and axillary nodes normal  Neurological: Left lower extremity plegia with contraction. Left upper extremity is quite weak. A and O x 3    LABS:                          9.3    12.76 )-----------( 179      ( 28 Sep 2022 11:25 )             32.5     CBC    WBC  12.76 <==, 11.40 <==, 8.80 <==    Hemoglobin  9.3 <<==, 10.1 <<==, 8.7 <<==    Hematocrit  32.5 <==, 34.3 <==, 29.4 <==    Platelets  179 <==, 228 <==, 233 <==      143  |  109<H>  |  36<H>  ----------------------------<  272<H>    09-25  4.7   |  23  |  0.59      LYTES    sodium  143 <==, 142 <==    potassium   4.7 <==, 4.3 <==    chloride  109 <==, 109 <==    carbon dioxide  23 <==, 21 <==    =============================================================================================  RENAL FUNCTION:    Creatinine:   0.59  <<==, 0.59  <<==, 0.56  <<==    BUN:   36 <==, 31 <==    ============================================================================================    calcium   9.0 <==, 8.8 <==    ============================================================================================  LFTs    AST:   20 <== , 14 <== , 15 <==     ALT:  18  <== , 16  <== , 17  <==     AP:  235  <=, 216  <=, 218  <=    Bili:  0.4  <=, 0.5  <=, 0.4  <=    PT/INR - ( 24 Sep 2022 11:34 )   PT: 11.3 sec;   INR: 0.98 ratio      Venous Blood Gas:  09-27 @ 11:15  7.42/45/61/29/90.4  VBG Lactate: 2.3    ABG - ( 21 Sep 2022 14:19 )  pH: 7.51  /  pCO2: 33    /  pO2: 158   / HCO3: 26    / Base Excess: 3.6   /  SaO2: 100.0     Venous Blood Gas:  09-19 @ 09:30  7.34/53/50/29/80.0  VBG Lactate: 3.5    Venous Blood Gas:  09-18 @ 03:50  7.39/48/47/29/78.2  VBG Lactate: 1.7    Procalcitonin, Serum: 0.23 ng/mL (09-18 @ 09:59)    D-Dimer Assay, Quantitative (09.18.22 @ 09:59)   D-Dimer Assay, Quantitative: 196:    C-Reactive Protein, Serum (09.18.22 @ 09:59)   C-Reactive Protein, Serum: 5 mg/L     Ferritin, Serum in AM (09.18.22 @ 09:59)   Ferritin, Serum: 63 ng/mL     < from: TTE with Doppler (w/Cont) (01.21.22 @ 14:08) >    Patient name: FRANKI MEHTA  YOB: 1935   Age: 86 (F)   MR#: [id number]  Study Date: 1/21/2022  ------------------------------------------------------------------------  Dimensions:    Normal Values:  LA:     2.5    2.0 - 4.0 cm  Ao:     3.3    2.0 - 3.8 cm  SEPTUM: 1.6    0.6 - 1.2 cm  PWT:    0.9    0.6 - 1.1 cm  LVIDd:  3.6    3.0 - 5.6 cm  LVIDs:  2.5    1.8 - 4.0 cm  Derived variables:  LVMI: 77 g/m2  RWT: 0.50  Fractional short: 31 %  EF (Visual Estimate): 70-75 %  ------------------------------------------------------------------------  Conclusions:  1. Concentric left ventricular hypertrophy.  2. Hyperdynamic left ventricular systolic function.  Endocardial visualization enhanced with intravenous  injection of Ultrasonic Enhancing Agent (Definity).  3. The right ventricle is not well visualized; grossly  normal right ventricular systolic function.  Pt refused to proceed with exam.  Limited Doppler study.  No trans aortic gradients.  Unable to rule out endocarditis.  ------------------------------------------------------------------------  Confirmed on 1/21/2022 - 15:42:57 by Kole Mcfarland M.D.  FASE  ------------------------------------------------------------------------  ---------------------------------------------------------------------------------------------------------------    MICROBIOLOGY:     COVID-19 PCR . (09.19.22 @ 18:31)   COVID-19 PCR: Detected:    Respiratory Viral Panel with COVID-19 by RYAN (09.08.22 @ 21:38)   Rapid RVP Result: [facility]   SARS-CoV-2: [facility]  This Respiratory Panel uses polymerase chain reaction (PCR) to detect for   adenovirus; coronavirus (HKU1, NL63, 229E, OC43); human metapneumovirus   (hMPV); human enterovirus/rhinovirus (Entero/RV); influenza A; influenza   A/H1; influenza A/H3; influenza A/H1-2009; influenza B; parainfluenza   viruses 1, 2, 3, 4; respiratory syncytial virus; Mycoplasma pneumoniae;   Chlamydophila pneumoniae; and SARS-CoV-2.     Culture - Blood (09.08.22 @ 20:39)   Specimen Source: .Blood Blood-Peripheral   Culture Results:   No growth to date.     Culture - Blood (09.08.22 @ 20:25)   Specimen Source: .Blood Blood-Peripheral   Culture Results:   No growth to date.     RADIOLOGY:  [x ] Chest radiographs reviewed and interpreted by me     EXAM:  CT CHEST                          PROCEDURE DATE:  09/28/2022      FINDINGS:    AIRWAYS, LUNGS and PLEURA: Patent central airways. Stable subsegmental   atelectasis within posterior upper lobes, lingula and basilar lower   lobes. Trace fluid within the left fissure. Otherwise, no significant   pleural effusion or pneumothorax.    MEDIASTINUM AND GIOVANA: No lymphadenopathy.    HEART AND VESSELS: Heart size is normal. No pericardial effusion.   Thoracic aorta and pulmonary artery normal in diameter. Calcifications of   the coronary arteries and aorta.    VISUALIZED UPPER ABDOMEN: Within normal limits.    CHEST WALL AND BONES: New T8 compression deformity. Redemonstration of T2   and T3 compression fractures. Unchanged mild T11 wedge deformity.   Redemonstration of multiple lytic lesions in the thoracic spine,   unchanged from 9/14/2022. Fixation plate and screws within the left   proximal humerus. Status post right-sided mastectomy.    LOWER NECK: Within normal limits.    MISCELLANEOUS: Near-complete opacification of the right maxillary sinus.   Mucosal thickening of the left maxillary sinus.    IMPRESSION:    The airways are patent without mucoid impaction.    Mild bilateral areas of subsegmental atelectasis. The lungs are otherwise   clear.    Diffuse lytic bone lesions are again noted. Mild-moderate loss of body   height of T8 vertebral body is new from prior exam.    ADE CAMACHO MD; Resident Radiologist  This document has been electronically signed.  GIANNI MANTILLA MD; Attending Radiologist  This document has been electronically signed. Sep 29 2022 10:50AM  ---------------------------------------------------------------------------------------------------------------    EXAM:  XR CHEST PORTABLE URGENT 1V                          PROCEDURE DATE:  09/19/2022      FINDINGS:  Radiographic interpretation is limited by patient positioning and   overlying artifact.  Gastrostomy tube is noted.  Left humeral fixation plate and screws are noted.  The heart size cannot be accurately assessed in this projection.  Left basilar hazy opacities  Low lung volumes bilaterally.  There is no pneumothorax or large pleural effusion.  No acute bony abnormality.    IMPRESSION:  Left basilar hazy opacity may represent atelectasis.    AMY JARAMILLO MD; Resident Radiologist  This document has been electronically signed.  DIANE CLARK MD; Attending Radiologist  This document has been electronically signed. Sep 19 2022  6:08PM  --------------------------------------------------------------------------------------------------------------  EXAM:  CT CHEST                          PROCEDURE DATE:  09/14/2022      FINDINGS:    LUNGS AND AIRWAYS: Patent central airways.  Subsegmental atelectasis is   seen in the bilateral upper and lower lobes.  PLEURA: No pleural effusion.  MEDIASTINUM AND GIOVANA: No lymphadenopathy.  VESSELS: Calcifications of the aorta, aortic valve and coronary arteries.  HEART: Heart size is normal. No pericardial effusion.  CHEST WALL AND LOWER NECK: Within normal limits.  VISUALIZED UPPER ABDOMEN: Cholelithiasis.  BONES: Multiple lytic bone lesions are again seen including a destructive   lesion at the level of the T5 vertebral body that may extend into the   left neural foramen (3:42), unchanged from prior CT chest.    IMPRESSION:  Subsegmental atelectasis in the bilateral upper and lower lobes.    Lytic bone lesions as described above, unchanged from prior CT chest   8/29/2022. Recommend MR thoracic spine for further evaluation.     ALEXANDRIA COLLINS MD; Resident Radiologist  This document has been electronically signed.  HAY IRVIN MD; Attending Radiologist  This document has been electronically signed. Sep 14 2022  3:23PM  ---------------------------------------------------------------------------------------------------------------  EXAM:  DUPLEX SCAN EXT VEINS LOWER BI                          PROCEDURE DATE:  09/10/2022      IMPRESSION:  Limited study  No evidence of deep venous thrombosis in either lower extremity.  Limited visualization of the calf veins.    KEYLA ROMERO MD; Attending Radiologist  This document has been electronically signed. Sep 10 2022 10:42AM  ---------------------------------------------------------------------------------------------------------------  EXAM:  CT ABDOMEN AND PELVIS IC                          PROCEDURE DATE:  08/31/2022      IMPRESSION:    Multiple lytic lesions are seen throughout the axial and appendicular   skeleton, some of which appear increased in size from CT abdomen pelvis   3/16/2022 when evaluated in retrospect. A few lytic lesions involve the   left intertrochanteric region and right acetabulum.    Endometrial thickening. Recommend dedicated pelvic sonogram.    Cystic lesions are seen within the pancreas. Recommend a dedicated MRI on   a nonemergent basis.    SULY KENDRICK MD; Resident Radiology  This document has been electronically signed.  BRITTANI MALCOLM MD; Attending Radiologist  This document has been electronically signed. Sep  1 2022 11:01AM  ---------------------------------------------------------------------------------------------------------------  EXAM:  XR HUMERUS MIN 2 VIEWS RT                          PROCEDURE DATE:  08/30/2022      EXAM:  Internal/external rotation AP right humerus from 8/30/2022 at 0927.   Compared to appearance on previous day chest CT.    IMPRESSION:  Generalized osteopenia. Redemonstrated focal lytic lesion in proximal   medial humeral diaphysis with small area of permeative cortical   destruction. No additional radiographically appreciated suspicious lytic   or blastic lesions in remaining imaged regions.    No current fractures or dislocations.    Preserved shoulder and elbow joint spaces.    IV cannula with attached tubing overlies upper arm.    MINE NICE MD; Attending Radiologist  This document has been electronically signed. Aug 30 2022 10:39AM  ---------------------------------------------------------------------------------------------------------------  EXAM:  DUPLEX EXT VEINS UPPER LT                          PROCEDURE DATE:  09/01/2022      IMPRESSION:  No evidence of left upper extremity deep venous thrombosis.    FAWN FITZGERALD MD; Attending Radiologist  This document has been electronically signed. Sep  1 2022  1:10PM  ---------------------------------------------------------------------------------------------------------------  EXAM:  DUPLEX SCAN EXT VEINS LOWER BI                          PROCEDURE DATE:  08/31/2022      IMPRESSION:  No evidence of deep venous thrombosis in either lower extremity.    KEYLA ROMERO MD; Attending Radiologist  This document has been electronically signed. Aug 31 2022  7:59PM  ------------------------------------------------------

## 2022-09-29 NOTE — PROGRESS NOTE ADULT - ASSESSMENT
89    year old female    h/o hemorrhagic CVA, has  PEG,  HTN, MI,   HLD, gout, right ca  breast   right Ca breast. s/p mastectomy in 2019,  s/p  sentinel node  localization.  4/4,  were  negative  s/p rrt  . in past,  for hypoxia. cxr with complete  collapsed  of  left lung, needing broch,   s p  bronchoscopy , pt  with  tracheomalacia  and  collapsed  airways,  makes  this a  difficult  situation, as there is no good rx for this     h/o bacteremia. on   pna, perforated  appendicitis,  ?  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 this will not alter rx. pt  redvd  extended  course  of ab   on iv  vanco and ertapenem.  till  2/9/.22.        *  admitted with   presumed  resp  failure/ pt was  sent back from [facility], the same day   pt seen in er.  appears   at her naseline.  no  wheezing /  atousable    ENT eval w/ laryngoscopy notable for vocal cords mobile and intact, no edema, upper airway patent     hypertonic saline for airway clearance   cxr, no pna     *   s/p cva, has  PEG,  npo  ,   on  synthroid, Keppra  ,  *   HTN, on  cardizem,  per  card dr jamison  *  h/o  Ca breast. /, s/p  R  mastectomy  *   obesity, bmi  was   41, now  is  30   *     c/c left  leg contracture ,  remains  bed  bound. functional  paraplegia,  needs  assistance  for  all her  adl's   *  pt  with  h/o  tracheomalacia  and  collapsed  airways, propensity  for  lung collpse,   pt is  at risk for  re admissions     on nocturnal  bipap,  difficult  situation, a s there  is no  good  rx  for  pt's  recurrent lung collapse hypoxia     h/o  of  chronic    mild  tachycardia          *   CT chest. lytic  lesions, T  spine/  ribs/ humerus/  oncoloigy  d r ohri.  suspect  metastatic ca breast        son. guicho Dan/ oncologist  has  also  discussed  with  son,  futility of  pursuing  bx. a s pt  is  not an ideal   candidate  for  chemo    CT  A.p ,/ prelim ,  pelvic  mets  per  oncology,   suspect  metastatic ca  breast,  was  awaiting   mri  spine/  pt unable  to tolerate  mri   per oncology, no  further  intervention/  and on  Arimidex, now,   per d r ohri   obesity,  bed bound.  functional paraplegia    per  son,   rehab promptly  returned   to er/ as they  did not  have  a  bipap machine /  care cortn to f/.p, needs  24/7  O2/ nocturnal bipap   difficult  situation,  bed  bound. obesity. c/c  hypercarbia, needing , prn /  nocturnal bipap/  with intermittent tachypnea    now  is  covid +. on iv steroid/ was  wheezing/ remdissivir/, seen by  house  ID   pt  with  frequent episodes  of tachypnea. has  obstructive /restrictive   lung  disease ,  obesity/ RAQUEL/ hypercarbia   called son, mlple  times,  message  left/    pt  remains  at  her  baseline ,   verbal  at  times  Afib, on xarelto  20m mg qd    s/p tachycardia, on   cardizem  and digoxin/  remains on iv steroid  per  pulm    pt  with poor  access   and  IR  note  seen, pt unable to lie  supine   pt at  baseline,  has  never  been  able  to  lie  supine,  given all  her  co  morbidities, and pt  has  been  optimized   to  fullest  degree possible=  very  difficult  situation   on   prednisolone,  via  peg  pt 's  situauion is tenuous   there  ie  no  room  for further    intervention. , CT  chest  prelim, atelectasis    [facility] pulm  f/p. regarding   d/c  planning       per  son, pt is  full code

## 2022-09-29 NOTE — PROGRESS NOTE ADULT - SUBJECTIVE AND OBJECTIVE BOX
Chief complaint  Patient is a 87y old  Female who presents with a chief complaint of SOB (29 Sep 2022 08:07)   Review of systems  Patient in bed, looks comfortable, no hypoglycemic episodes.    Labs and Fingersticks  CAPILLARY BLOOD GLUCOSE      POCT Blood Glucose.: 145 mg/dL (29 Sep 2022 12:26)  POCT Blood Glucose.: 140 mg/dL (29 Sep 2022 06:01)  POCT Blood Glucose.: 255 mg/dL (29 Sep 2022 00:09)  POCT Blood Glucose.: 211 mg/dL (28 Sep 2022 18:00)                                            9.3    12.76 )-----------( 179      ( 28 Sep 2022 11:25 )             32.5     Medications  MEDICATIONS  (STANDING):  albuterol/ipratropium for Nebulization 3 milliLiter(s) Nebulizer every 6 hours  allopurinol 100 milliGRAM(s) Oral daily  anastrozole 1 milliGRAM(s) Oral daily  atorvastatin 20 milliGRAM(s) Oral at bedtime  bisacodyl 5 milliGRAM(s) Oral at bedtime  buDESOnide    Inhalation Suspension 0.5 milliGRAM(s) Inhalation every 12 hours  chlorhexidine 2% Cloths 1 Application(s) Topical <User Schedule>  dextrose 50% Injectable 25 Gram(s) IV Push once  dextrose 50% Injectable 25 Gram(s) IV Push once  dextrose 50% Injectable 12.5 Gram(s) IV Push once  dextrose Oral Gel 15 Gram(s) Oral once  digoxin     Tablet 125 MICROGram(s) Oral daily  diltiazem    Tablet 30 milliGRAM(s) Enteral Tube every 6 hours  glucagon  Injectable 1 milliGRAM(s) IntraMuscular once  guaiFENesin Oral Liquid (Sugar-Free) 300 milliGRAM(s) Oral every 6 hours  insulin lispro (ADMELOG) corrective regimen sliding scale   SubCutaneous every 6 hours  insulin NPH human recombinant 7 Unit(s) SubCutaneous every 6 hours  levETIRAcetam  Solution 750 milliGRAM(s) Oral two times a day  levothyroxine 75 MICROGram(s) Oral daily  melatonin 3 milliGRAM(s) Oral at bedtime  prednisoLONE    3 mG/mL Solution (ORAPRED) 45 milliGRAM(s) Oral daily  prednisoLONE    3 mG/mL Solution (ORAPRED)   Oral   rivaroxaban 20 milliGRAM(s) Oral with dinner  senna 2 Tablet(s) Oral at bedtime      Physical Exam  General: Patient comfortable in bed  Vital Signs Last 12 Hrs  T(F): 98 (09-29-22 @ 10:57), Max: 98 (09-29-22 @ 04:37)  HR: 108 (09-29-22 @ 10:57) (82 - 108)  BP: 101/50 (09-29-22 @ 10:57) (101/50 - 117/65)  BP(mean): --  RR: 19 (09-29-22 @ 10:57) (19 - 20)  SpO2: 98% (09-29-22 @ 10:57) (95% - 99%)  Neck: No palpable thyroid nodules.  CVS: S1S2, No murmurs  Respiratory: No wheezing, no crepitations  GI: Abdomen soft, bowel sounds positive  Musculoskeletal:  edema lower extremities.   Skin: No skin rashes, no ecchymosis    Diagnostics             Chief complaint  Patient is a 87y old  Female who presents with a chief complaint of SOB (29 Sep 2022 08:07)   Review of systems  Patient in bed, looks comfortable, no hypoglycemic episodes.    Labs and Fingersticks  CAPILLARY BLOOD GLUCOSE      POCT Blood Glucose.: 145 mg/dL (29 Sep 2022 12:26)  POCT Blood Glucose.: 140 mg/dL (29 Sep 2022 06:01)  POCT Blood Glucose.: 255 mg/dL (29 Sep 2022 00:09)  POCT Blood Glucose.: 211 mg/dL (28 Sep 2022 18:00)                                            9.3    12.76 )-----------( 179      ( 28 Sep 2022 11:25 )             32.5     Medications  MEDICATIONS  (STANDING):  albuterol/ipratropium for Nebulization 3 milliLiter(s) Nebulizer every 6 hours  allopurinol 100 milliGRAM(s) Oral daily  anastrozole 1 milliGRAM(s) Oral daily  atorvastatin 20 milliGRAM(s) Oral at bedtime  bisacodyl 5 milliGRAM(s) Oral at bedtime  buDESOnide    Inhalation Suspension 0.5 milliGRAM(s) Inhalation every 12 hours  chlorhexidine 2% Cloths 1 Application(s) Topical <User Schedule>  dextrose 50% Injectable 25 Gram(s) IV Push once  dextrose 50% Injectable 25 Gram(s) IV Push once  dextrose 50% Injectable 12.5 Gram(s) IV Push once  dextrose Oral Gel 15 Gram(s) Oral once  digoxin     Tablet 125 MICROGram(s) Oral daily  diltiazem    Tablet 30 milliGRAM(s) Enteral Tube every 6 hours  glucagon  Injectable 1 milliGRAM(s) IntraMuscular once  guaiFENesin Oral Liquid (Sugar-Free) 300 milliGRAM(s) Oral every 6 hours  insulin lispro (ADMELOG) corrective regimen sliding scale   SubCutaneous every 6 hours  insulin NPH human recombinant 7 Unit(s) SubCutaneous every 6 hours  levETIRAcetam  Solution 750 milliGRAM(s) Oral two times a day  levothyroxine 75 MICROGram(s) Oral daily  melatonin 3 milliGRAM(s) Oral at bedtime  prednisoLONE    3 mG/mL Solution (ORAPRED) 45 milliGRAM(s) Oral daily  prednisoLONE    3 mG/mL Solution (ORAPRED)   Oral   rivaroxaban 20 milliGRAM(s) Oral with dinner  senna 2 Tablet(s) Oral at bedtime      Physical Exam  General: Patient comfortable in bed  Vital Signs Last 12 Hrs  T(F): 98 (09-29-22 @ 10:57), Max: 98 (09-29-22 @ 04:37)  HR: 108 (09-29-22 @ 10:57) (82 - 108)  BP: 101/50 (09-29-22 @ 10:57) (101/50 - 117/65)  BP(mean): --  RR: 19 (09-29-22 @ 10:57) (19 - 20)  SpO2: 98% (09-29-22 @ 10:57) (95% - 99%)  Neck: No palpable thyroid nodules.  CVS: S1S2, No murmurs  Respiratory: No wheezing, no crepitations  GI: Abdomen soft, bowel sounds positive  Musculoskeletal:  edema lower extremities.   Skin: No skin rashes, no ecchymosis    Diagnostics

## 2022-09-29 NOTE — PROGRESS NOTE ADULT - SUBJECTIVE AND OBJECTIVE BOX
CARDIOLOGY     PROGRESS  NOTE   ________________________________________________    CHIEF COMPLAINT:Patient is a 87y old  Female who presents with a chief complaint of SOB (29 Sep 2022 06:00)  no complain.  	  REVIEW OF SYSTEMS:  CONSTITUTIONAL: No fever, weight loss, or fatigue  EYES: No eye pain, visual disturbances, or discharge  ENT:  No difficulty hearing, tinnitus, vertigo; No sinus or throat pain  NECK: No pain or stiffness  RESPIRATORY: No cough, wheezing, chills or hemoptysis; +Shortness of Breath  CARDIOVASCULAR: No chest pain, palpitations, passing out, dizziness, or leg swelling  GASTROINTESTINAL: No abdominal or epigastric pain. No nausea, vomiting, or hematemesis; No diarrhea or constipation. No melena or hematochezia.  GENITOURINARY: No dysuria, frequency, hematuria, or incontinence  NEUROLOGICAL: No headaches, memory loss, loss of strength, numbness, or tremors  SKIN: No itching, burning, rashes, or lesions   LYMPH Nodes: No enlarged glands  ENDOCRINE: No heat or cold intolerance; No hair loss  MUSCULOSKELETAL: No joint pain or swelling; No muscle, back, or extremity pain  PSYCHIATRIC: No depression, anxiety, mood swings, or difficulty sleeping  HEME/LYMPH: No easy bruising, or bleeding gums  ALLERGY AND IMMUNOLOGIC: No hives or eczema	    [ ] All others negative	  [ ] Unable to obtain    PHYSICAL EXAM:  T(C): 36.7 (09-29-22 @ 04:37), Max: 36.7 (09-29-22 @ 04:37)  HR: 82 (09-29-22 @ 06:00) (79 - 99)  BP: 117/65 (09-29-22 @ 04:37) (101/49 - 126/85)  RR: 20 (09-29-22 @ 04:37) (20 - 21)  SpO2: 98% (09-29-22 @ 06:00) (95% - 99%)  Wt(kg): --  I&O's Summary    28 Sep 2022 07:01  -  29 Sep 2022 07:00  --------------------------------------------------------  IN: 0 mL / OUT: 600 mL / NET: -600 mL        Appearance: Normal	  HEENT:   Normal oral mucosa, PERRL, EOMI	  Lymphatic: No lymphadenopathy  Cardiovascular: Normal S1 S2, No JVD, + murmurs, No edema  Respiratory: rhonchi  Gastrointestinal:  Soft, Non-tender, + BS	  Skin: No rashes, No ecchymoses, No cyanosis	  Neurologic: Non-focal  Extremities: Normal range of motion, No clubbing, cyanosis or edema  Vascular: Peripheral pulses palpable 2+ bilaterally    MEDICATIONS  (STANDING):  albuterol/ipratropium for Nebulization 3 milliLiter(s) Nebulizer every 6 hours  allopurinol 100 milliGRAM(s) Oral daily  anastrozole 1 milliGRAM(s) Oral daily  atorvastatin 20 milliGRAM(s) Oral at bedtime  bisacodyl 5 milliGRAM(s) Oral at bedtime  buDESOnide    Inhalation Suspension 0.5 milliGRAM(s) Inhalation every 12 hours  chlorhexidine 2% Cloths 1 Application(s) Topical <User Schedule>  dextrose 50% Injectable 25 Gram(s) IV Push once  dextrose 50% Injectable 12.5 Gram(s) IV Push once  dextrose 50% Injectable 25 Gram(s) IV Push once  dextrose Oral Gel 15 Gram(s) Oral once  digoxin     Tablet 125 MICROGram(s) Oral daily  diltiazem    Tablet 30 milliGRAM(s) Enteral Tube every 6 hours  glucagon  Injectable 1 milliGRAM(s) IntraMuscular once  guaiFENesin Oral Liquid (Sugar-Free) 300 milliGRAM(s) Oral every 6 hours  insulin lispro (ADMELOG) corrective regimen sliding scale   SubCutaneous every 6 hours  insulin NPH human recombinant 7 Unit(s) SubCutaneous every 6 hours  levETIRAcetam  Solution 750 milliGRAM(s) Oral two times a day  levothyroxine 75 MICROGram(s) Oral daily  melatonin 3 milliGRAM(s) Oral at bedtime  prednisoLONE    3 mG/mL Solution (ORAPRED) 45 milliGRAM(s) Oral daily  rivaroxaban 20 milliGRAM(s) Oral with dinner  senna 2 Tablet(s) Oral at bedtime      TELEMETRY: 	    ECG:  	  RADIOLOGY:  OTHER: 	  	  LABS:	 	    CARDIAC MARKERS:                                9.3    12.76 )-----------( 179      ( 28 Sep 2022 11:25 )             32.5           proBNP: Serum Pro-Brain Natriuretic Peptide: 461 pg/mL (09-08 @ 20:56)    Lipid Profile:   HgA1c:   TSH: Thyroid Stimulating Hormone, Serum: 0.32 uIU/mL (09-24 @ 11:34)  Thyroid Stimulating Hormone, Serum: 0.30 uIU/mL (09-24 @ 11:34)          Assessment and plan  ---------------------------  86F w/ extensive hx including severe tracheobronchomalacia (c/b hypoxia 2/2 mucous plugging and recurrent L lung collapse), 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 Very limited hx obtained from pt given mental status, dyspnea, and use of BIPAP. Unable to reach sonSy. History obtained from staff/chart review.    Per ED staff who saw pt when she first arrived, pt was notably dyspneic with increased WOB and wheezing. Supposedly missed use of nightly BIPAP at rehab. Reportedly denied fever and cough. On encounter for admission, pt with BIPAP mask on, appearing slightly lethargic and mildly dyspneic, but was able to answer some questions. Pt endorsed having SOB. Denied pain. Seemed to believe initially that she was at [facility]? but was difficult to understand her responses.    Of note, pt has multiple recent admissions with similar presentation. Most recently was hospitalized from 8/28/22-9/8/22 for SOB, treated with airway clearance and completed 5-day course of Zosyn for empiric coverage of PNA (though per Pulm, unlikely to have PNA). Course c/b PEG displacement and subsequent reinsertion by IR on 8/30. In addition to her usual PEG feeds, pt was also started on puree diet with moderately thickened fluids for pleasure feeds. Also was started on anastrozole for most likely dx of metastatic breast cancer to bones (pt was unable to tolerate further cancer workup of spine lesions). Pt remained full code during recent admissions.  pt with metastatic ca to the bone with sob sec to obesity and underlying lung and cardiac disease  increase sob sec to missing BiPAP at night  may consider pulmonary eval  may consider hospice care/ palliative care  dvt prophylaxis high risk on xarelto  pt with bone mets, no further nieto/ onc noted  on steroid as per pulmonary  a.fib on 09/23/increase Cardizem dose, will add digoxin  may increase Xarelto dose sec to a.fib  add digoxin/converted to NSR  change dig to po

## 2022-09-29 NOTE — PROGRESS NOTE ADULT - ASSESSMENT
Assessment  DMT2: 87y Female with DM T2 with hyperglycemia, A1C 7%, on NPH insulin and coverage, blood sugars are stable and trending within acceptable range, no hypoglycemias, on peg tube feeds, planning DC tomorrow.  Covid: on medications, stable, monitored.  Hypothyroidism: On Synthroid 75 mcg po daily, ?compliance, euthyroid.  HLD: on statin.        Jeanie Gao MD  Cell: [phone]  Office: [phone]               Assessment  DMT2: 87y Female with DM T2 with hyperglycemia, A1C 7%, on NPH insulin and coverage, blood sugars are stable and trending within  acceptable range, no hypoglycemias, on peg tube feeds, planning DC tomorrow.  Covid: on medications, stable, monitored.  Hypothyroidism: On Synthroid 75 mcg po daily, ?compliance, euthyroid.  HLD: on statin.        Jeanie Gao MD  Cell: [phone]  Office: [phone]

## 2022-09-29 NOTE — PROGRESS NOTE ADULT - SUBJECTIVE AND OBJECTIVE BOX
afberile  REVIEW OF SYSTEMS:  GEN: no fever,    no chills  RESP: no SOB,   no cough  CVS: no chest pain,   no palpitations  GI: no abdominal pain,   no nausea,   no vomiting,   no constipation,   no diarrhea  : no dysuria,   no frequency  NEURO: no headache,   no dizziness  PSYCH: no depression,   not anxious  Derm : no rash    MEDICATIONS  (STANDING):  albuterol/ipratropium for Nebulization 3 milliLiter(s) Nebulizer every 6 hours  allopurinol 100 milliGRAM(s) Oral daily  anastrozole 1 milliGRAM(s) Oral daily  atorvastatin 20 milliGRAM(s) Oral at bedtime  bisacodyl 5 milliGRAM(s) Oral at bedtime  buDESOnide    Inhalation Suspension 0.5 milliGRAM(s) Inhalation every 12 hours  chlorhexidine 2% Cloths 1 Application(s) Topical <User Schedule>  dextrose 50% Injectable 25 Gram(s) IV Push once  dextrose 50% Injectable 12.5 Gram(s) IV Push once  dextrose 50% Injectable 25 Gram(s) IV Push once  dextrose Oral Gel 15 Gram(s) Oral once  digoxin     Tablet 125 MICROGram(s) Oral daily  diltiazem    Tablet 30 milliGRAM(s) Enteral Tube every 6 hours  glucagon  Injectable 1 milliGRAM(s) IntraMuscular once  guaiFENesin Oral Liquid (Sugar-Free) 300 milliGRAM(s) Oral every 6 hours  insulin lispro (ADMELOG) corrective regimen sliding scale   SubCutaneous every 6 hours  insulin NPH human recombinant 7 Unit(s) SubCutaneous every 6 hours  levETIRAcetam  Solution 750 milliGRAM(s) Oral two times a day  levothyroxine 75 MICROGram(s) Oral daily  melatonin 3 milliGRAM(s) Oral at bedtime  prednisoLONE    3 mG/mL Solution (ORAPRED) 45 milliGRAM(s) Oral daily  rivaroxaban 20 milliGRAM(s) Oral with dinner  senna 2 Tablet(s) Oral at bedtime    MEDICATIONS  (PRN):  acetaminophen     Tablet .. 650 milliGRAM(s) Oral every 6 hours PRN Temp greater or equal to 38C (100.4F), Mild Pain (1 - 3)  ALPRAZolam 0.25 milliGRAM(s) Oral every 8 hours PRN anxiety  polyethylene glycol 3350 17 Gram(s) Oral daily PRN Constipation      Vital Signs Last 24 Hrs  T(C): 36.7 (29 Sep 2022 04:37), Max: 36.7 (29 Sep 2022 04:37)  T(F): 98 (29 Sep 2022 04:37), Max: 98 (29 Sep 2022 04:37)  HR: 91 (29 Sep 2022 04:37) (79 - 99)  BP: 117/65 (29 Sep 2022 04:37) (101/49 - 126/85)  BP(mean): --  RR: 20 (29 Sep 2022 04:37) (20 - 21)  SpO2: 99% (29 Sep 2022 04:37) (95% - 99%)    Parameters below as of 29 Sep 2022 04:37  Patient On (Oxygen Delivery Method): BiPAP/CPAP      CAPILLARY BLOOD GLUCOSE      POCT Blood Glucose.: 255 mg/dL (29 Sep 2022 00:09)  POCT Blood Glucose.: 211 mg/dL (28 Sep 2022 18:00)  POCT Blood Glucose.: 204 mg/dL (28 Sep 2022 11:44)    I&O's Summary    27 Sep 2022 07:01  -  28 Sep 2022 07:00  --------------------------------------------------------  IN: 0 mL / OUT: 350 mL / NET: -350 mL    28 Sep 2022 07:01  -  29 Sep 2022 06:00  --------------------------------------------------------  IN: 0 mL / OUT: 600 mL / NET: -600 mL        PHYSICAL EXAM:  HEAD:  Atraumatic, Normocephalic  NECK: Supple, No   JVD  CHEST/LUNG:   no     rales,     no,    rhonchi  HEART: Regular rate and rhythm;         murmur  ABDOMEN: Soft, Nontender, ;   EXTREMITIES:        edema  NEUROLOGY:  alert    LABS:                        9.3    12.76 )-----------( 179      ( 28 Sep 2022 11:25 )             32.5                       09-27 @ 11:15  4.7  61      Thyroid Stimulating Hormone, Serum: 0.32 uIU/mL (09-24 @ 11:34)          Consultant(s) Notes Reviewed:      Care Discussed with Consultants/Other Providers:

## 2022-09-30 NOTE — PROGRESS NOTE ADULT - SUBJECTIVE AND OBJECTIVE BOX
CARDIOLOGY     PROGRESS  NOTE   ________________________________________________    CHIEF COMPLAINT:Patient is a 87y old  Female who presents with a chief complaint of SOB (30 Sep 2022 07:22)  no complain, doing better.  	  REVIEW OF SYSTEMS:  CONSTITUTIONAL: No fever, weight loss, or fatigue  EYES: No eye pain, visual disturbances, or discharge  ENT:  No difficulty hearing, tinnitus, vertigo; No sinus or throat pain  NECK: No pain or stiffness  RESPIRATORY: No cough, wheezing, chills or hemoptysis; + Shortness of Breath  CARDIOVASCULAR: No chest pain, palpitations, passing out, dizziness, or leg swelling  GASTROINTESTINAL: No abdominal or epigastric pain. No nausea, vomiting, or hematemesis; No diarrhea or constipation. No melena or hematochezia.  GENITOURINARY: No dysuria, frequency, hematuria, or incontinence  NEUROLOGICAL: No headaches, memory loss, loss of strength, numbness, or tremors  SKIN: No itching, burning, rashes, or lesions   LYMPH Nodes: No enlarged glands  ENDOCRINE: No heat or cold intolerance; No hair loss  MUSCULOSKELETAL: No joint pain or swelling; No muscle, back, or extremity pain  PSYCHIATRIC: No depression, anxiety, mood swings, or difficulty sleeping  HEME/LYMPH: No easy bruising, or bleeding gums  ALLERGY AND IMMUNOLOGIC: No hives or eczema	    [ ] All others negative	  [ ] Unable to obtain    PHYSICAL EXAM:  T(C): 36.9 (09-30-22 @ 04:32), Max: 37.2 (09-29-22 @ 20:59)  HR: 105 (09-30-22 @ 06:04) (103 - 117)  BP: 122/57 (09-30-22 @ 06:47) (100/69 - 132/92)  RR: 20 (09-30-22 @ 04:32) (19 - 20)  SpO2: 95% (09-30-22 @ 06:04) (95% - 99%)  Wt(kg): --  I&O's Summary      Appearance: Normal	  HEENT:   Normal oral mucosa, PERRL, EOMI	  Lymphatic: No lymphadenopathy  Cardiovascular: Normal S1 S2, No JVD, + murmurs, No edema  Respiratory: Lungs clear to auscultation	  Psychiatry: A & O x 3, Mood & affect appropriate  Gastrointestinal:  Soft, Non-tender, + BS	  Skin: No rashes, No ecchymoses, No cyanosis	  Neurologic: Non-focal  Extremities: Normal range of motion, No clubbing, cyanosis or edema  Vascular: Peripheral pulses palpable 2+ bilaterally    MEDICATIONS  (STANDING):  albuterol/ipratropium for Nebulization 3 milliLiter(s) Nebulizer every 6 hours  allopurinol 100 milliGRAM(s) Oral daily  anastrozole 1 milliGRAM(s) Oral daily  atorvastatin 20 milliGRAM(s) Oral at bedtime  bisacodyl 5 milliGRAM(s) Oral at bedtime  buDESOnide    Inhalation Suspension 0.5 milliGRAM(s) Inhalation every 12 hours  chlorhexidine 2% Cloths 1 Application(s) Topical <User Schedule>  dextrose 50% Injectable 25 Gram(s) IV Push once  dextrose 50% Injectable 12.5 Gram(s) IV Push once  dextrose 50% Injectable 25 Gram(s) IV Push once  dextrose Oral Gel 15 Gram(s) Oral once  digoxin     Tablet 125 MICROGram(s) Oral daily  diltiazem    Tablet 30 milliGRAM(s) Enteral Tube every 6 hours  glucagon  Injectable 1 milliGRAM(s) IntraMuscular once  guaiFENesin Oral Liquid (Sugar-Free) 300 milliGRAM(s) Oral every 6 hours  insulin lispro (ADMELOG) corrective regimen sliding scale   SubCutaneous every 6 hours  insulin NPH human recombinant 7 Unit(s) SubCutaneous every 6 hours  levETIRAcetam  Solution 750 milliGRAM(s) Oral two times a day  levothyroxine 75 MICROGram(s) Oral daily  melatonin 3 milliGRAM(s) Oral at bedtime  prednisoLONE    3 mG/mL Solution (ORAPRED) 45 milliGRAM(s) Oral daily  prednisoLONE    3 mG/mL Solution (ORAPRED)   Oral   rivaroxaban 20 milliGRAM(s) Oral with dinner  senna 2 Tablet(s) Oral at bedtime      TELEMETRY: 	    ECG:  	  RADIOLOGY:  OTHER: 	  	  LABS:	 	    CARDIAC MARKERS:                                9.3    12.76 )-----------( 179      ( 28 Sep 2022 11:25 )             32.5           proBNP: Serum Pro-Brain Natriuretic Peptide: 461 pg/mL (09-08 @ 20:56)    Lipid Profile:   HgA1c:   TSH: Thyroid Stimulating Hormone, Serum: 0.32 uIU/mL (09-24 @ 11:34)  Thyroid Stimulating Hormone, Serum: 0.30 uIU/mL (09-24 @ 11:34)          Assessment and plan  ---------------------------  86F w/ extensive hx including severe tracheobronchomalacia (c/b hypoxia 2/2 mucous plugging and recurrent L lung collapse), 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 Very limited hx obtained from pt given mental status, dyspnea, and use of BIPAP. Unable to reach sonSy. History obtained from staff/chart review.    Per ED staff who saw pt when she first arrived, pt was notably dyspneic with increased WOB and wheezing. Supposedly missed use of nightly BIPAP at rehab. Reportedly denied fever and cough. On encounter for admission, pt with BIPAP mask on, appearing slightly lethargic and mildly dyspneic, but was able to answer some questions. Pt endorsed having SOB. Denied pain. Seemed to believe initially that she was at [facility]? but was difficult to understand her responses.    Of note, pt has multiple recent admissions with similar presentation. Most recently was hospitalized from 8/28/22-9/8/22 for SOB, treated with airway clearance and completed 5-day course of Zosyn for empiric coverage of PNA (though per Pulm, unlikely to have PNA). Course c/b PEG displacement and subsequent reinsertion by IR on 8/30. In addition to her usual PEG feeds, pt was also started on puree diet with moderately thickened fluids for pleasure feeds. Also was started on anastrozole for most likely dx of metastatic breast cancer to bones (pt was unable to tolerate further cancer workup of spine lesions). Pt remained full code during recent admissions.  pt with metastatic ca to the bone with sob sec to obesity and underlying lung and cardiac disease  increase sob sec to missing BiPAP at night  may consider pulmonary eval  may consider hospice care/ palliative care  dvt prophylaxis high risk on xarelto  pt with bone mets, no further nieto/ onc noted  on steroid as per pulmonary  a.fib on 09/23/increase Cardizem dose, will add digoxin  may increase Xarelto dose sec to a.fib  add digoxin/converted to NSR  change dig to po  remained in nsr, dc telept non compliant

## 2022-09-30 NOTE — PROGRESS NOTE ADULT - SUBJECTIVE AND OBJECTIVE BOX
[facility] - PROGRESS NOTE    CHIEF COMPLAINT: COVID infection; chronic hypoxic/hypercapnic respiratory failure; mucous plugging; atelectasis; tracheobronchomalacia; bronchospasm; weak cough; 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; PEG in place    INTERVAL HISTORY: hoisted up in bed earlier this AM - neck flexed onto the anterior chest wall; increased tachypnea without hypoxemia on a 3lpm nasal canula today; no cough, sputum production, hemoptysis, chest congestion or wheeze; no fevers, chills or sweats; no chest pain/pressure or palpitations; tolerating ICE chips.     REVIEW OF SYSTEMS:  Constitutional: As per interval history  HEENT: Within normal limits  CV: As per interval history  Resp: As per interval history  GI: dysphagia  : Within normal limits  Musculoskeletal: Within normal limits  Skin: Within normal limits  Neurological: CVA - left sided plegia  Psychiatric: Within normal limits  Endocrine: Within normal limits  Hematologic/Lymphatic: Within normal limits  Allergic/Immunologic: Within normal limits    MEDICATIONS:     Pulmonary "  albuterol/ipratropium for Nebulization 3 milliLiter(s) Nebulizer every 6 hours  buDESOnide    Inhalation Suspension 0.5 milliGRAM(s) Inhalation every 12 hours  guaiFENesin Oral Liquid (Sugar-Free) 300 milliGRAM(s) Oral every 6 hours    Anti-microbials:    Cardiovascular:  digoxin     Tablet 125 MICROGram(s) Oral daily  diltiazem    Tablet 30 milliGRAM(s) Enteral Tube every 6 hours    Other:  allopurinol 100 milliGRAM(s) Oral daily  anastrozole 1 milliGRAM(s) Oral daily  atorvastatin 20 milliGRAM(s) Oral at bedtime  bisacodyl 5 milliGRAM(s) Oral at bedtime  chlorhexidine 2% Cloths 1 Application(s) Topical <User Schedule>  dextrose 50% Injectable 25 Gram(s) IV Push once  dextrose 50% Injectable 12.5 Gram(s) IV Push once  dextrose 50% Injectable 25 Gram(s) IV Push once  dextrose Oral Gel 15 Gram(s) Oral once  glucagon  Injectable 1 milliGRAM(s) IntraMuscular once  insulin lispro (ADMELOG) corrective regimen sliding scale   SubCutaneous every 6 hours  insulin NPH human recombinant 7 Unit(s) SubCutaneous every 6 hours  levETIRAcetam  Solution 750 milliGRAM(s) Oral two times a day  levothyroxine 75 MICROGram(s) Oral daily  melatonin 3 milliGRAM(s) Oral at bedtime  prednisoLONE    3 mG/mL Solution (ORAPRED) 45 milliGRAM(s) Oral daily  prednisoLONE    3 mG/mL Solution (ORAPRED)   Oral   rivaroxaban 20 milliGRAM(s) Oral with dinner  senna 2 Tablet(s) Oral at bedtime    MEDICATIONS  (PRN):  acetaminophen     Tablet .. 650 milliGRAM(s) Oral every 6 hours PRN Temp greater or equal to 38C (100.4F), Mild Pain (1 - 3)  ALPRAZolam 0.25 milliGRAM(s) Oral every 8 hours PRN anxiety  polyethylene glycol 3350 17 Gram(s) Oral daily PRN Constipation      OBJECTIVE:    POCT Blood Glucose.: 174 mg/dL (30 Sep 2022 06:02)  POCT Blood Glucose.: 252 mg/dL (30 Sep 2022 00:14)  POCT Blood Glucose.: 173 mg/dL (29 Sep 2022 17:40)  POCT Blood Glucose.: 145 mg/dL (29 Sep 2022 12:26)      PHYSICAL EXAM:       ICU Vital Signs Last 24 Hrs  T(C): 36.9 (30 Sep 2022 04:32), Max: 37.2 (29 Sep 2022 20:59)  T(F): 98.4 (30 Sep 2022 04:32), Max: 99 (29 Sep 2022 20:59)  HR: 105 (30 Sep 2022 06:04) (103 - 117)  BP: 122/57 (30 Sep 2022 06:47) (100/69 - 132/92)  BP(mean): --  ABP: --  ABP(mean): --  RR: 20 (30 Sep 2022 04:32) (19 - 20)  SpO2: 95% (30 Sep 2022 06:04) (95% - 99%) on 3lpm nasal canula     General: Awake. Alert. Cooperative. No distress. Appears stated age. Obese. Upright in bed.  HEENT: Atraumatic. Normocephalic. Anicteric. Normal oral mucosa. PERRL. EOMI. Mallampati class IV airway. Poor dentition.  Neck: Supple. Trachea midline. Thyroid without enlargement/tenderness/nodules. No carotid bruit. No JVD. Short and wide. Neck flexed with chin resting on her anterior chest.  Cardiovascular: Regular rate and rhythm. S1 S2 normal. III/VI systolic murmur  Respiratory: Mild tachypnea. Clear anteriorly. Kyphosis. Poor chest wall excursion  Abdomen: Soft. Non-tender. Non-distended. No organomegaly. No masses. Normal bowel sounds. Obese. PEG.  Extremities: Warm to touch. No clubbing or cyanosis. No pedal edema. Large legs. Left leg contracted under the right leg  Pulses: 2+ peripheral pulses all extremities.	  Skin: Normal skin color. No rashes or lesions. No ecchymoses. No cyanosis. Warm to touch.  Lymph Nodes: Cervical, supraclavicular and axillary nodes normal  Neurological: Left lower extremity plegia with contraction. Left upper extremity is quite weak. A and O x 3    LABS:                          9.3    12.76 )-----------( 179      ( 28 Sep 2022 11:25 )             32.5     CBC    WBC  12.76 <==, 11.40 <==, 8.80 <==    Hemoglobin  9.3 <<==, 10.1 <<==, 8.7 <<==    Hematocrit  32.5 <==, 34.3 <==, 29.4 <==    Platelets  179 <==, 228 <==, 233 <==      143  |  109<H>  |  36<H>  ----------------------------<  272<H>    09-25  4.7   |  23  |  0.59      LYTES    sodium  143 <==, 142 <==    potassium   4.7 <==, 4.3 <==    chloride  109 <==, 109 <==    carbon dioxide  23 <==, 21 <==    =============================================================================================  RENAL FUNCTION:    Creatinine:   0.59  <<==, 0.59  <<==, 0.56  <<==    BUN:   36 <==, 31 <==    ============================================================================================    calcium   9.0 <==, 8.8 <==      ============================================================================================  LFTs    AST:   20 <== , 14 <== , 15 <==     ALT:  18  <== , 16  <== , 17  <==     AP:  235  <=, 216  <=, 218  <=    Bili:  0.4  <=, 0.5  <=, 0.4  <=    PT/INR - ( 24 Sep 2022 11:34 )   PT: 11.3 sec;   INR: 0.98 ratio      Venous Blood Gas:  09-27 @ 11:15  7.42/45/61/29/90.4  VBG Lactate: 2.3    ABG - ( 21 Sep 2022 14:19 )  pH: 7.51  /  pCO2: 33    /  pO2: 158   / HCO3: 26    / Base Excess: 3.6   /  SaO2: 100.0     Venous Blood Gas:  09-19 @ 09:30  7.34/53/50/29/80.0  VBG Lactate: 3.5    Venous Blood Gas:  09-18 @ 03:50  7.39/48/47/29/78.2  VBG Lactate: 1.7    Procalcitonin, Serum: 0.23 ng/mL (09-18 @ 09:59)    D-Dimer Assay, Quantitative (09.18.22 @ 09:59)   D-Dimer Assay, Quantitative: 196:    C-Reactive Protein, Serum (09.18.22 @ 09:59)   C-Reactive Protein, Serum: 5 mg/L     Ferritin, Serum in AM (09.18.22 @ 09:59)   Ferritin, Serum: 63 ng/mL     < from: TTE with Doppler (w/Cont) (01.21.22 @ 14:08) >    Patient name: FRANKI MEHTA  YOB: 1935   Age: 86 (F)   MR#: [id number]  Study Date: 1/21/2022  ------------------------------------------------------------------------  Dimensions:    Normal Values:  LA:     2.5    2.0 - 4.0 cm  Ao:     3.3    2.0 - 3.8 cm  SEPTUM: 1.6    0.6 - 1.2 cm  PWT:    0.9    0.6 - 1.1 cm  LVIDd:  3.6    3.0 - 5.6 cm  LVIDs:  2.5    1.8 - 4.0 cm  Derived variables:  LVMI: 77 g/m2  RWT: 0.50  Fractional short: 31 %  EF (Visual Estimate): 70-75 %  ------------------------------------------------------------------------  Conclusions:  1. Concentric left ventricular hypertrophy.  2. Hyperdynamic left ventricular systolic function.  Endocardial visualization enhanced with intravenous  injection of Ultrasonic Enhancing Agent (Definity).  3. The right ventricle is not well visualized; grossly  normal right ventricular systolic function.  Pt refused to proceed with exam.  Limited Doppler study.  No trans aortic gradients.  Unable to rule out endocarditis.  ------------------------------------------------------------------------  Confirmed on 1/21/2022 - 15:42:57 by Kole Mcfarland M.D.  FASE  ------------------------------------------------------------------------  ---------------------------------------------------------------------------------------------------------------    MICROBIOLOGY:     COVID-19 PCR . (09.19.22 @ 18:31)   COVID-19 PCR: Detected:    Respiratory Viral Panel with COVID-19 by RYAN (09.08.22 @ 21:38)   Rapid RVP Result: [facility]   SARS-CoV-2: [facility]  This Respiratory Panel uses polymerase chain reaction (PCR) to detect for   adenovirus; coronavirus (HKU1, NL63, 229E, OC43); human metapneumovirus   (hMPV); human enterovirus/rhinovirus (Entero/RV); influenza A; influenza   A/H1; influenza A/H3; influenza A/H1-2009; influenza B; parainfluenza   viruses 1, 2, 3, 4; respiratory syncytial virus; Mycoplasma pneumoniae;   Chlamydophila pneumoniae; and SARS-CoV-2.     Culture - Blood (09.08.22 @ 20:39)   Specimen Source: .Blood Blood-Peripheral   Culture Results:   No growth to date.     Culture - Blood (09.08.22 @ 20:25)   Specimen Source: .Blood Blood-Peripheral   Culture Results:   No growth to date.     RADIOLOGY:  [x ] Chest radiographs reviewed and interpreted by me     EXAM:  CT CHEST                          PROCEDURE DATE:  09/28/2022      FINDINGS:    AIRWAYS, LUNGS and PLEURA: Patent central airways. Stable subsegmental   atelectasis within posterior upper lobes, lingula and basilar lower   lobes. Trace fluid within the left fissure. Otherwise, no significant   pleural effusion or pneumothorax.    MEDIASTINUM AND GIOVANA: No lymphadenopathy.    HEART AND VESSELS: Heart size is normal. No pericardial effusion.   Thoracic aorta and pulmonary artery normal in diameter. Calcifications of   the coronary arteries and aorta.    VISUALIZED UPPER ABDOMEN: Within normal limits.    CHEST WALL AND BONES: New T8 compression deformity. Redemonstration of T2   and T3 compression fractures. Unchanged mild T11 wedge deformity.   Redemonstration of multiple lytic lesions in the thoracic spine,   unchanged from 9/14/2022. Fixation plate and screws within the left   proximal humerus. Status post right-sided mastectomy.    LOWER NECK: Within normal limits.    MISCELLANEOUS: Near-complete opacification of the right maxillary sinus.   Mucosal thickening of the left maxillary sinus.    IMPRESSION:    The airways are patent without mucoid impaction.    Mild bilateral areas of subsegmental atelectasis. The lungs are otherwise   clear.    Diffuse lytic bone lesions are again noted. Mild-moderate loss of body   height of T8 vertebral body is new from prior exam.    ADE CAMACHO MD; Resident Radiologist  This document has been electronically signed.  GIANNI MANTILLA MD; Attending Radiologist  This document has been electronically signed. Sep 29 2022 10:50AM  ---------------------------------------------------------------------------------------------------------------    EXAM:  XR CHEST PORTABLE URGENT 1V                          PROCEDURE DATE:  09/19/2022      FINDINGS:  Radiographic interpretation is limited by patient positioning and   overlying artifact.  Gastrostomy tube is noted.  Left humeral fixation plate and screws are noted.  The heart size cannot be accurately assessed in this projection.  Left basilar hazy opacities  Low lung volumes bilaterally.  There is no pneumothorax or large pleural effusion.  No acute bony abnormality.    IMPRESSION:  Left basilar hazy opacity may represent atelectasis.    AMY JARAMILLO MD; Resident Radiologist  This document has been electronically signed.  DIANE CALRK MD; Attending Radiologist  This document has been electronically signed. Sep 19 2022  6:08PM  --------------------------------------------------------------------------------------------------------------  EXAM:  CT CHEST                          PROCEDURE DATE:  09/14/2022      FINDINGS:    LUNGS AND AIRWAYS: Patent central airways.  Subsegmental atelectasis is   seen in the bilateral upper and lower lobes.  PLEURA: No pleural effusion.  MEDIASTINUM AND GIOVANA: No lymphadenopathy.  VESSELS: Calcifications of the aorta, aortic valve and coronary arteries.  HEART: Heart size is normal. No pericardial effusion.  CHEST WALL AND LOWER NECK: Within normal limits.  VISUALIZED UPPER ABDOMEN: Cholelithiasis.  BONES: Multiple lytic bone lesions are again seen including a destructive   lesion at the level of the T5 vertebral body that may extend into the   left neural foramen (3:42), unchanged from prior CT chest.    IMPRESSION:  Subsegmental atelectasis in the bilateral upper and lower lobes.    Lytic bone lesions as described above, unchanged from prior CT chest   8/29/2022. Recommend MR thoracic spine for further evaluation.     ALEXANDRIA COLLINS MD; Resident Radiologist  This document has been electronically signed.  HAY IRVIN MD; Attending Radiologist  This document has been electronically signed. Sep 14 2022  3:23PM  ---------------------------------------------------------------------------------------------------------------  EXAM:  DUPLEX SCAN EXT VEINS LOWER BI                          PROCEDURE DATE:  09/10/2022      IMPRESSION:  Limited study  No evidence of deep venous thrombosis in either lower extremity.  Limited visualization of the calf veins.    KEYLA ROMERO MD; Attending Radiologist  This document has been electronically signed. Sep 10 2022 10:42AM  ---------------------------------------------------------------------------------------------------------------  EXAM:  CT ABDOMEN AND PELVIS IC                          PROCEDURE DATE:  08/31/2022      IMPRESSION:    Multiple lytic lesions are seen throughout the axial and appendicular   skeleton, some of which appear increased in size from CT abdomen pelvis   3/16/2022 when evaluated in retrospect. A few lytic lesions involve the   left intertrochanteric region and right acetabulum.    Endometrial thickening. Recommend dedicated pelvic sonogram.    Cystic lesions are seen within the pancreas. Recommend a dedicated MRI on   a nonemergent basis.    SULY KENDRICK MD; Resident Radiology  This document has been electronically signed.  BRITTANI MALCOLM MD; Attending Radiologist  This document has been electronically signed. Sep  1 2022 11:01AM  ---------------------------------------------------------------------------------------------------------------  EXAM:  XR HUMERUS MIN 2 VIEWS RT                          PROCEDURE DATE:  08/30/2022      EXAM:  Internal/external rotation AP right humerus from 8/30/2022 at 0927.   Compared to appearance on previous day chest CT.    IMPRESSION:  Generalized osteopenia. Redemonstrated focal lytic lesion in proximal   medial humeral diaphysis with small area of permeative cortical   destruction. No additional radiographically appreciated suspicious lytic   or blastic lesions in remaining imaged regions.    No current fractures or dislocations.    Preserved shoulder and elbow joint spaces.    IV cannula with attached tubing overlies upper arm.    MINE NICE MD; Attending Radiologist  This document has been electronically signed. Aug 30 2022 10:39AM  ---------------------------------------------------------------------------------------------------------------  EXAM:  DUPLEX EXT VEINS UPPER LT                          PROCEDURE DATE:  09/01/2022      IMPRESSION:  No evidence of left upper extremity deep venous thrombosis.    FAWN FITZGERALD MD; Attending Radiologist  This document has been electronically signed. Sep  1 2022  1:10PM  ---------------------------------------------------------------------------------------------------------------  EXAM:  DUPLEX SCAN EXT VEINS LOWER BI                          PROCEDURE DATE:  08/31/2022      IMPRESSION:  No evidence of deep venous thrombosis in either lower extremity.    KEYLA ROMERO MD; Attending Radiologist  This document has been electronically signed. Aug 31 2022  7:59PM  ------------------------------------------------------

## 2022-09-30 NOTE — PROVIDER CONTACT NOTE (OTHER) - BACKGROUND
Pt admitted for Shortness of breath
Pt admitted for Shortness of breath
tracheobronchomalecia hx; on bipap at night for sob/wob
dx: SOB, COVID+
Admitted for shortness of breath.
Patient has had difficulty breathing with SOB before, Xanax given at 1020am
Patient has had chronic belly breathing and difficulty breathing with tachanpnia

## 2022-09-30 NOTE — RAPID RESPONSE TEAM SUMMARY - NSSITUATIONBACKGROUNDRRT_GEN_ALL_CORE
87yoF with hx of DM T2 with hyperglycemia in setting of steroids, A1C 7%, on NPH insulin and coverage, on peg tube feeds, RRT for respiratory distress. Initial VS: 98.7F, KENNETH 118/82,  in setting of known Afib, RR 30, O2 sat 94% on FiO2 of 30%. Pt often becomes agitated per nursing staff with changing/turning but shortly after returns to baseline with rest. Exam reveals coarse breath sounds b/l but no wheezing/ronchi/rales. No indication for suctioning or bronchodilator treatment at this time, as pt is receiving standing duoneb treatments. ABG performed shows stable alkalosis with pH 7.47, pCO2  35, pO2 134, bicarb 26, sat 99.6%. Pt stable to continue scheduled nocturnal Bipap therapy at minimal FiO2 settings at this time. Pt calm, breathing at baseline at the end of RRT.  87yoF with hx of DM T2 with hyperglycemia in setting of steroids, A1C 7%, on NPH insulin and coverage, on peg tube feeds, RRT for respiratory distress. Initial VS: 98.7F, KENNETH 118/82,  in setting of known Afib, RR 30, O2 sat 94% on FiO2 of 30%. Pt often becomes agitated per nursing staff with changing/turning but shortly after returns to baseline with rest. Exam reveals coarse breath sounds b/l but no wheezing/ronchi/rales. No indication for suctioning or bronchodilator treatment at this time, as pt is receiving standing duoneb treatments. ABG performed shows stable alkalosis with pH 7.47, pCO2  35, pO2 134, bicarb 26, sat 99.6%. Pt stable to continue scheduled nocturnal Bipap therapy at minimal FiO2 settings at this time. Pt received scheduled NPH insulin at 12:30 prior to RRT per endo recs. Pt calm, breathing at baseline at the end of RRT.

## 2022-09-30 NOTE — PROGRESS NOTE ADULT - ASSESSMENT
Assessment  DMT2: 87y Female with DM T2 with hyperglycemia, A1C 7%, on NPH insulin and coverage, blood sugars are trending up/running high and not at target in the setting of steroid management, no hypoglycemias, on peg tube feeds, on prednisolone, RRT for respiratory distress.  Covid: on medications, stable, monitored.  Hypothyroidism: On Synthroid 75 mcg po daily, ?compliance, euthyroid.  HLD: on statin.        Jeanie Gao MD  Cell: [phone]  Office: [phone]               Assessment  DMT2: 87y Female with DM T2 with hyperglycemia, A1C 7%, on NPH insulin and coverage, blood sugars are trending up/running high and not at  target in the setting of steroid management, no hypoglycemias, on peg tube feeds, on prednisolone, RRT for respiratory distress.  Covid: on medications, stable, monitored.  Hypothyroidism: On Synthroid 75 mcg po daily, ?compliance, euthyroid.  HLD: on statin.        Jeanie Gao MD  Cell: [phone]  Office: [phone]

## 2022-09-30 NOTE — PROGRESS NOTE ADULT - ASSESSMENT
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 She also has a history of HTN, HLD, CAD s/p MI with preserved LVEF and moderate aortic stenosis. The patient was admitted to [facility] in December 2021 with fever and abdominal pain due to perforated appendicitis. She was treated with tube drainage and antibiotics for a polymicrobial infection. Hospital course was complicated by respiratory failure due to mucous plugging with complete collapse of the left lung. She underwent several bronchoscopies for pulmonary toilet and was found to have severe tracheobronchomalacia. Her respiratory status has remained "stable" while at [facility] on nebulized bronchodilators and hypertonic saline and without nocturnal NIV. She was admitted in March with hematochezia. The left lower lobe was well aerated on a CT scan of the abdomen and pelvis with only bibasilar subsegmental atelectasis - there was scattered sigmoid diverticulosis without evidence of diverticulitis - interval decrease in size of a fluid collection in the region of the previously perforated appendicitis measuring 2.8 x 1.5 cm previously measuring 4.0 x 2.2 cm - slight increase in mild adjacent inflammatory change. The GI bleed was treated conservatively.  She was admitted on 8/29 with shortness of breath in the setting of back, neck and left lower extremity pain. She required NIV for mild acute hypercapnic respiratory failure that quickly resolved. Her respiratory status remained stable on room air and on her usual nebs and mucolytics. She was started on a puree diet with moderately thickened fluids in addition to PEG feeds following evaluation by the speech and swallow team. She was started on anastrazole for likely metastatic breast cancer to the bone. On the day of discharge, the patient was quite anxious with shortness of breath, chest congestion and wheeze - she was making a grunting noise with expiration. She was returned from [facility] that evening. Currently she is tachypneic and using accessory muscles of respiration. She has an audible wheeze and cough productive of scant sputum. No fevers, chills or sweats. No chest pain/pressure or palpitations.     the patient has new onset bronchospasm with increased work of breathing and worsening left lower lobe atelectasis -  she has severe tracheobronchomalacia that has resulted in mucous plugging with complete left lung collapse requiring several bronchoscopies for pulmonary toilet in the past - the patient was felt not to be a candidate for surgery for the tracheobronchomalacia and stenting was felt to have more risk than benefit     metastatic breast cancer    9/19 -  found to be COVID positive on discharge RVP; awake and alert; now with marked worsening of shortness of breath and using her accessory muscles of respiration; severe chest congestion and wheeze exacerbated by neck flexion; increasing pCO2, WBC and lactate; occasional cough productive of scant sputum; no fevers, chills or sweats; no chest pain/pressure or palpitations; back on PEG feeds; switched from nebs to inhalers yesterday as Dr. Dexter was informed by nursing that COVID positive patients cannot receive nebs at [facility]    9/21 - spent the day on AVAPS -> removed this AM with somewhat less increased work of breathing and decreased chest congestion and wheeze; VBG not sent this AM; in airborne and contact isolation due to hospital acquired COVID infection; awake and alert; occasional cough productive of scant sputum; no fevers, chills or sweats; no chest pain/pressure or palpitations;     9/23 - off AVAPS this AM - laying almost flat in bed with neck and back flexed; increased work of breathing; in airborne and contact isolation due to hospital acquired COVID infection; awake and alert; begging for lemonade; occasional cough productive of scant sputum; improving chest congestion or wheeze; no fevers, chills or sweats; no chest pain/pressure or palpitations; receiving PEG feeds when off NIV;    9/26 -  again laying almost flat in bed with neck and back flexed; decreased work of breathing now on a nasal canula during the day and AVAPS at night; airborne and contact isolation being discontinued after treatment for a hospital acquired COVID infection; awake and alert asking for something to eat and drink; occasional cough productive of scant sputum; improving chest congestion and wheeze; no fevers, chills or sweats; no chest pain/pressure or palpitations; receiving PEG feeds    9/30 - hoisted up in bed earlier this AM - neck flexed onto the anterior chest wall; increased tachypnea without hypoxemia on a 3lpm nasal canula today; no cough, sputum production, hemoptysis, chest congestion or wheeze; no fevers, chills or sweats; no chest pain/pressure or palpitations; tolerating ICE chips.       PLAN/RECOMMENDATIONS:    the patient's head needs to be elevated greater than 45 degrees at all times  repeat chest CT -> patent central airways - stable subsegmental atelectasis within posterior upper lobes, lingula and basilar lower lobes - trace fluid within the left fissure - no significant pleural effusion or pneumothorax - new T8 compression deformity - redemonstration of T2 and T3 compression fractures - unchanged mild T11 wedge deformity - redemonstration of multiple lytic lesions in the thoracic spine - fixation plate and screws within the left proximal humerus - status post right-sided mastectomy  oxygen supplementation via a nasal canula during the day to keep saturation greater than 92% - currently on a 3lpm nasal canula -> taper as tolerated  continue nocturnal BIPAP to prevent atelectasis for now  VBG 9/27 -> 7.42/45/61/90.4% - resolved respiratory acidosis  has completed a 5 day course of remdesivir  prednisolone 45mg via PEG - taper as written - will need to adjust insulin accordingly  continue albuterol/atrovent nebs q6h  continue pulmicort 0.5mg nebs q12h  continue guaifenesin 300mg 4 times daily via PEG  incentive spirometry   PEG feeds - see recommendations by nutrition service  CT scan 8/31 -> multiple lytic lesions are seen throughout the axial and appendicular skeleton, some of which appear increased in size from CT abdomen pelvis 3/16/2022 when evaluated in retrospect - a few lytic lesions involve the left intertrochanteric region and right acetabulum  oncology follow-up noted    s/p right simple mastectomy 4/2019 for breast cancer but did not receive adjuvant hormonal therapy    it is likely that the patient has metastatic breast cancer    started on anastrozole as the patient could not tolerate a MRI and is not a candidate for bone biopsy  cardiology follow-up for management of HTN, HLD, CAD, aortic stenosis and atrial fibrillation     cardiac meds: digoxin/diltiazem/xarelto  DVT prophylaxis - xarelto  glucose control  bowel regimen  analgesics  keppra      Will follow with you. Plan of care discussed with the patient at bedside and the dedicated floor NP. The patient's respiratory status has improved dramatically but remains tenuous. Would observe the patient until midday and discharge to [facility] if "stable"    Kennedy Marcano MD, [facility]  [phone]  Pulmonary Medicine           ASSESSMENT:     87 year old gentlewoman, lifelong non-smoker, well known to me without history of intrinsic lung disease. 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 She also has a history of HTN, HLD, CAD s/p MI with preserved LVEF and moderate aortic stenosis. The patient was admitted to [facility] in December 2021 with fever and abdominal pain due to perforated appendicitis. She was treated with tube drainage and antibiotics for a polymicrobial infection. Hospital course was complicated by respiratory failure due to mucous plugging with complete collapse of the left lung. She underwent several bronchoscopies for pulmonary toilet and was found to have severe tracheobronchomalacia. Her respiratory status has remained "stable" while at [facility] on nebulized bronchodilators and hypertonic saline and without nocturnal NIV. She was admitted in March with hematochezia. The left lower lobe was well aerated on a CT scan of the abdomen and pelvis with only bibasilar subsegmental atelectasis - there was scattered sigmoid diverticulosis without evidence of diverticulitis - interval decrease in size of a fluid collection in the region of the previously perforated appendicitis measuring 2.8 x 1.5 cm previously measuring 4.0 x 2.2 cm - slight increase in mild adjacent inflammatory change. The GI bleed was treated conservatively.  She was admitted on 8/29 with shortness of breath in the setting of back, neck and left lower extremity pain. She required NIV for mild acute hypercapnic respiratory failure that quickly resolved. Her respiratory status remained stable on room air and on her usual nebs and mucolytics. She was started on a puree diet with moderately thickened fluids in addition to PEG feeds following evaluation by the speech and swallow team. She was started on anastrazole for likely metastatic breast cancer to the bone. On the day of discharge, the patient was quite anxious with shortness of breath, chest congestion and wheeze - she was making a grunting noise with expiration. She was returned from [facility] that evening. Currently she is tachypneic and using accessory muscles of respiration. She has an audible wheeze and cough productive of scant sputum. No fevers, chills or sweats. No chest pain/pressure or palpitations.     the patient has new onset bronchospasm with increased work of breathing and worsening left lower lobe atelectasis -  she has severe tracheobronchomalacia that has resulted in mucous plugging with complete left lung collapse requiring several bronchoscopies for pulmonary toilet in the past - the patient was felt not to be a candidate for surgery for the tracheobronchomalacia and stenting was felt to have more risk than benefit     metastatic breast cancer    9/19 -  found to be COVID positive on discharge RVP; awake and alert; now with marked worsening of shortness of breath and using her accessory muscles of respiration; severe chest congestion and wheeze exacerbated by neck flexion; increasing pCO2, WBC and lactate; occasional cough productive of scant sputum; no fevers, chills or sweats; no chest pain/pressure or palpitations; back on PEG feeds; switched from nebs to inhalers yesterday as Dr. Dexter was informed by nursing that COVID positive patients cannot receive nebs at [facility]    9/21 - spent the day on AVAPS -> removed this AM with somewhat less increased work of breathing and decreased chest congestion and wheeze; VBG not sent this AM; in airborne and contact isolation due to hospital acquired COVID infection; awake and alert; occasional cough productive of scant sputum; no fevers, chills or sweats; no chest pain/pressure or palpitations;     9/23 - off AVAPS this AM - laying almost flat in bed with neck and back flexed; increased work of breathing; in airborne and contact isolation due to hospital acquired COVID infection; awake and alert; begging for lemonade; occasional cough productive of scant sputum; improving chest congestion or wheeze; no fevers, chills or sweats; no chest pain/pressure or palpitations; receiving PEG feeds when off NIV;    9/26 -  again laying almost flat in bed with neck and back flexed; decreased work of breathing now on a nasal canula during the day and AVAPS at night; airborne and contact isolation being discontinued after treatment for a hospital acquired COVID infection; awake and alert asking for something to eat and drink; occasional cough productive of scant sputum; improving chest congestion and wheeze; no fevers, chills or sweats; no chest pain/pressure or palpitations; receiving PEG feeds    9/30 - hoisted up in bed earlier this AM - neck flexed onto the anterior chest wall; increased tachypnea without hypoxemia on a 3lpm nasal canula today; no cough, sputum production, hemoptysis, chest congestion or wheeze; no fevers, chills or sweats; no chest pain/pressure or palpitations; tolerating ICE chips.       PLAN/RECOMMENDATIONS:    the patient's head needs to be elevated greater than 45 degrees at all times  repeat chest CT -> patent central airways - stable subsegmental atelectasis within posterior upper lobes, lingula and basilar lower lobes - trace fluid within the left fissure - no significant pleural effusion or pneumothorax - new T8 compression deformity - redemonstration of T2 and T3 compression fractures - unchanged mild T11 wedge deformity - redemonstration of multiple lytic lesions in the thoracic spine - fixation plate and screws within the left proximal humerus - status post right-sided mastectomy  oxygen supplementation via a nasal canula during the day to keep saturation greater than 92% - currently on a 3lpm nasal canula -> taper as tolerated  continue nocturnal BIPAP to prevent atelectasis for now  VBG 9/27 -> 7.42/45/61/90.4% - resolved respiratory acidosis  has completed a 5 day course of remdesivir  prednisolone 45mg via PEG - taper as written - will need to adjust insulin accordingly  continue albuterol/atrovent nebs q6h  continue pulmicort 0.5mg nebs q12h  continue guaifenesin 300mg 4 times daily via PEG  incentive spirometry   PEG feeds - see recommendations by nutrition service  CT scan 8/31 -> multiple lytic lesions are seen throughout the axial and appendicular skeleton, some of which appear increased in size from CT abdomen pelvis 3/16/2022 when evaluated in retrospect - a few lytic lesions involve the left intertrochanteric region and right acetabulum  oncology follow-up noted    s/p right simple mastectomy 4/2019 for breast cancer but did not receive adjuvant hormonal therapy    it is likely that the patient has metastatic breast cancer    started on anastrozole as the patient could not tolerate a MRI and is not a candidate for bone biopsy  cardiology follow-up for management of HTN, HLD, CAD, aortic stenosis and atrial fibrillation     cardiac meds: digoxin/diltiazem/xarelto  DVT prophylaxis - xarelto  glucose control  bowel regimen  analgesics  keppra      Will follow with you. Plan of care discussed with the patient at bedside and the dedicated floor NP. The patient's respiratory status has improved dramatically but remains tenuous. Would observe the patient until midday and discharge to [facility] if "stable"    Dr. Tami Rendon will be covering our service. Please call [phone] with questions or clinical changes. Thank you.      Kennedy Marcano MD, [facility]  [phone]  Pulmonary Medicine

## 2022-09-30 NOTE — PROGRESS NOTE ADULT - ASSESSMENT
[address] [facility] [place] [address], [address]   24-HOUR EEG       Name:  Tess Esposito   MR#:  [id number]   :  2007   Account #:  [de-identified]    Date of Procedure:  2017   Date of Adm:  2017       STUDY PERFORMED: 24-hour electroencephalogram.    INTRODUCTION: This is a 16-channel digital prolonged, ambulatory,   outpatient recording. The recording was initiated 2017 at   12:23:09 and discontinued 2017 at 11:26:07, and 23 hours, 2   minutes and 58 seconds of recording time was obtained representing   8298 epochs of EEG activity (10 seconds per epoch). EEG was interpreting utilizing epoch by epoch visual analysis. In   addition, computer software to identify spikes in rhythmic discharges   was utilized and reviewed in analysis of the recording. DESCRIPTION OF RECORDING: The basic occipital resting frequency   consisted of 30 to 60 microvolts, 8.5 to 9.5 Hz alpha rhythm. In the   more anterior derivations, symmetrical lower amplitude 14 to 24 beta   activity is seen and is symmetrically mixed with slower rhythms. In drowsiness, there is drop out of the dominant posterior rhythm   associated with increased symmetrical slowing in the EEG   background. Vertex transients appear in light sleep. Sleep spindles and K complexes   appear in stage 2 of sleep. In slow wave sleep, there is symmetrical increase and higher   amplitude, 1 to 3 Hz of delta activity. Spindle activity persists in slow   wave sleep. No focal lateralizing or epileptiform discharges were identified. CORRELATION OF CLINICAL EVENTS: Available information   suggests that no seizures occurred during the recording.     INTERPRETATION: This outpatient overnight, prolonged, ambulatory   EEG lasting 23 hours and 2 minutes is normal.        MD RAEGAN Sherman / CARMEN   D:  2017   22:08   T:  2017   22:26   Job #:  [id number]    year old female    h/o hemorrhagic CVA, has  PEG,  HTN, MI,   HLD, gout, right ca  breast   right Ca breast. s/p mastectomy in 2019,  s/p  sentinel node  localization.  4/4,  were  negative  s/p rrt  . in past,  for hypoxia. cxr with complete  collapsed  of  left lung, needing broch,   s p  bronchoscopy , pt  with  tracheomalacia  and  collapsed  airways,  makes  this a  difficult  situation, as there is no good rx for this     h/o bacteremia. on   pna, perforated  appendicitis,  ?  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 this will not alter rx. pt  redvd  extended  course  of ab   on iv  vanco and ertapenem.  till  2/9/.22.        *  admitted with   presumed  resp  failure/ pt was  sent back from [facility], the same day   pt seen in er.  appears   at her naseline.  no  wheezing /  atousable    ENT eval w/ laryngoscopy notable for vocal cords mobile and intact, no edema, upper airway patent     hypertonic saline for airway clearance   cxr, no pna     *   s/p cva, has  PEG,  npo  ,   on  synthroid, Keppra  ,  *   HTN, on  cardizem,  per  card dr jamison  *  h/o  Ca breast. /, s/p  R  mastectomy  *   obesity, bmi  was   41, now  is  30   *     c/c left  leg contracture ,  remains  bed  bound. functional  paraplegia,  needs  assistance  for  all her  adl's   *  pt  with  h/o  tracheomalacia  and  collapsed  airways, propensity  for  lung collpse,   pt is  at risk for  re admissions     on nocturnal  bipap,  difficult  situation, a s there  is no  good  rx  for  pt's  recurrent lung collapse hypoxia     h/o  of  chronic    mild  tachycardia          *   CT chest. lytic  lesions, T  spine/  ribs/ humerus/  oncoloigy  d r ohri.  suspect  metastatic ca breast        son. guicho Dan/ oncologist  has  also  discussed  with  son,  futility of  pursuing  bx. a s pt  is  not an ideal   candidate  for  chemo    CT  A.p ,/ prelim ,  pelvic  mets  per  oncology,   suspect  metastatic ca  breast,  was  awaiting   mri  spine/  pt unable  to tolerate  mri   per oncology, no  further  intervention/  and on  Arimidex, now,   per d r ohri   obesity,  bed bound.  functional paraplegia    per  son,   rehab promptly  returned   to er/ as they  did not  have  a  bipap machine /  care cortn to f/.p, needs  24/7  O2/ nocturnal bipap   difficult  situation,  bed  bound. obesity. c/c  hypercarbia, needing , prn /  nocturnal bipap/  with intermittent tachypnea    now  is  covid +. on iv steroid/ was  wheezing/ remdissivir/, seen by  house  ID   pt  with  frequent episodes  of tachypnea. has  obstructive /restrictive   lung  disease ,  obesity/ RAQUEL/ hypercarbia   called son, mlple  times,  message  left/    pt  remains  at  her  baseline ,   verbal  at  times  Afib, on xarelto  20m mg qd    s/p tachycardia, on   cardizem  and digoxin/  remains on iv steroid  per  pulm    pt  with poor  access   and  IR  note  seen, pt unable to lie  supine   pt at  baseline,  has  never  been  able  to  lie  supine,  given all  her  co  morbidities, and pt  has  been  optimized   to  fullest  degree possible=  very  difficult  situation   on   prednisolone,  via  peg  pt 's  situation is tenuous   there  ie  no  room  for further    intervention. , CT  chest  prelim, atelectasis    [facility] pulm  f/p. regarding   d/c  planning  pt  with  ongoing intermittent tachycardia/ tachypnea/   unable  to  reach  son       per  son, pt is  full code

## 2022-09-30 NOTE — PROGRESS NOTE ADULT - SUBJECTIVE AND OBJECTIVE BOX
Chief complaint  Patient is a 87y old  Female who presents with a chief complaint of SOB (30 Sep 2022 08:30)   Review of systems  RRT for respiratory distress, no hypoglycemic episodes.    Labs and Fingersticks  CAPILLARY BLOOD GLUCOSE      POCT Blood Glucose.: 378 mg/dL (30 Sep 2022 13:45)  POCT Blood Glucose.: 330 mg/dL (30 Sep 2022 12:34)  POCT Blood Glucose.: 174 mg/dL (30 Sep 2022 06:02)  POCT Blood Glucose.: 252 mg/dL (30 Sep 2022 00:14)  POCT Blood Glucose.: 173 mg/dL (29 Sep 2022 17:40)      Anion Gap, Serum: 17 (09-30 @ 08:28)      Calcium, Total Serum: 8.7 (09-30 @ 08:28)  Albumin, Serum: 3.0 *L* (09-30 @ 08:28)    Alanine Aminotransferase (ALT/SGPT): 20 (09-30 @ 08:28)  Alkaline Phosphatase, Serum: 201 *H* (09-30 @ 08:28)  Aspartate Aminotransferase (AST/SGOT): 24 (09-30 @ 08:28)        09-30    151<H>  |  114<H>  |  35<H>  ----------------------------<  210<H>  4.3   |  20<L>  |  0.63    Ca    8.7      30 Sep 2022 08:28    TPro  6.0  /  Alb  3.0<L>  /  TBili  0.5  /  DBili  x   /  AST  24  /  ALT  20  /  AlkPhos  201<H>  09-30                        See note  QNS   )-----------( QNS      ( 30 Sep 2022 08:27 )             See note    Medications  MEDICATIONS  (STANDING):  albuterol/ipratropium for Nebulization 3 milliLiter(s) Nebulizer every 6 hours  allopurinol 100 milliGRAM(s) Oral daily  anastrozole 1 milliGRAM(s) Oral daily  atorvastatin 20 milliGRAM(s) Oral at bedtime  bisacodyl 5 milliGRAM(s) Oral at bedtime  buDESOnide    Inhalation Suspension 0.5 milliGRAM(s) Inhalation every 12 hours  chlorhexidine 2% Cloths 1 Application(s) Topical <User Schedule>  dextrose 50% Injectable 12.5 Gram(s) IV Push once  dextrose 50% Injectable 25 Gram(s) IV Push once  dextrose 50% Injectable 25 Gram(s) IV Push once  dextrose Oral Gel 15 Gram(s) Oral once  digoxin     Tablet 125 MICROGram(s) Oral daily  diltiazem    Tablet 30 milliGRAM(s) Enteral Tube every 6 hours  glucagon  Injectable 1 milliGRAM(s) IntraMuscular once  guaiFENesin Oral Liquid (Sugar-Free) 300 milliGRAM(s) Oral every 6 hours  insulin lispro (ADMELOG) corrective regimen sliding scale   SubCutaneous every 6 hours  insulin NPH human recombinant 9 Unit(s) SubCutaneous every 6 hours  levETIRAcetam  Solution 750 milliGRAM(s) Oral two times a day  levothyroxine 75 MICROGram(s) Oral daily  melatonin 3 milliGRAM(s) Oral at bedtime  prednisoLONE    3 mG/mL Solution (ORAPRED) 45 milliGRAM(s) Oral daily  prednisoLONE    3 mG/mL Solution (ORAPRED)   Oral   rivaroxaban 20 milliGRAM(s) Oral with dinner  senna 2 Tablet(s) Oral at bedtime      Physical Exam  Vital Signs Last 12 Hrs  T(F): 98.7 (09-30-22 @ 14:36), Max: 98.7 (09-30-22 @ 14:36)  HR: 110 (09-30-22 @ 14:36) (105 - 112)  BP: 110/83 (09-30-22 @ 14:36) (110/83 - 160/83)  BP(mean): --  RR: 24 (09-30-22 @ 14:36) (20 - 24)  SpO2: 98% (09-30-22 @ 14:36) (95% - 98%)             Chief complaint  Patient is a 87y old  Female who presents with a chief complaint of SOB (30 Sep 2022 08:30)   Review of systems  RRT for respiratory distress, no hypoglycemic episodes.    Labs and Fingersticks  CAPILLARY BLOOD GLUCOSE      POCT Blood Glucose.: 378 mg/dL (30 Sep 2022 13:45)  POCT Blood Glucose.: 330 mg/dL (30 Sep 2022 12:34)  POCT Blood Glucose.: 174 mg/dL (30 Sep 2022 06:02)  POCT Blood Glucose.: 252 mg/dL (30 Sep 2022 00:14)  POCT Blood Glucose.: 173 mg/dL (29 Sep 2022 17:40)      Anion Gap, Serum: 17 (09-30 @ 08:28)      Calcium, Total Serum: 8.7 (09-30 @ 08:28)  Albumin, Serum: 3.0 *L* (09-30 @ 08:28)    Alanine Aminotransferase (ALT/SGPT): 20 (09-30 @ 08:28)  Alkaline Phosphatase, Serum: 201 *H* (09-30 @ 08:28)  Aspartate Aminotransferase (AST/SGOT): 24 (09-30 @ 08:28)        09-30    151<H>  |  114<H>  |  35<H>  ----------------------------<  210<H>  4.3   |  20<L>  |  0.63    Ca    8.7      30 Sep 2022 08:28    TPro  6.0  /  Alb  3.0<L>  /  TBili  0.5  /  DBili  x   /  AST  24  /  ALT  20  /  AlkPhos  201<H>  09-30                        See note  QNS   )-----------( QNS      ( 30 Sep 2022 08:27 )             See note    Medications  MEDICATIONS  (STANDING):  albuterol/ipratropium for Nebulization 3 milliLiter(s) Nebulizer every 6 hours  allopurinol 100 milliGRAM(s) Oral daily  anastrozole 1 milliGRAM(s) Oral daily  atorvastatin 20 milliGRAM(s) Oral at bedtime  bisacodyl 5 milliGRAM(s) Oral at bedtime  buDESOnide    Inhalation Suspension 0.5 milliGRAM(s) Inhalation every 12 hours  chlorhexidine 2% Cloths 1 Application(s) Topical <User Schedule>  dextrose 50% Injectable 12.5 Gram(s) IV Push once  dextrose 50% Injectable 25 Gram(s) IV Push once  dextrose 50% Injectable 25 Gram(s) IV Push once  dextrose Oral Gel 15 Gram(s) Oral once  digoxin     Tablet 125 MICROGram(s) Oral daily  diltiazem    Tablet 30 milliGRAM(s) Enteral Tube every 6 hours  glucagon  Injectable 1 milliGRAM(s) IntraMuscular once  guaiFENesin Oral Liquid (Sugar-Free) 300 milliGRAM(s) Oral every 6 hours  insulin lispro (ADMELOG) corrective regimen sliding scale   SubCutaneous every 6 hours  insulin NPH human recombinant 9 Unit(s) SubCutaneous every 6 hours  levETIRAcetam  Solution 750 milliGRAM(s) Oral two times a day  levothyroxine 75 MICROGram(s) Oral daily  melatonin 3 milliGRAM(s) Oral at bedtime  prednisoLONE    3 mG/mL Solution (ORAPRED) 45 milliGRAM(s) Oral daily  prednisoLONE    3 mG/mL Solution (ORAPRED)   Oral   rivaroxaban 20 milliGRAM(s) Oral with dinner  senna 2 Tablet(s) Oral at bedtime      Physical Exam  Vital Signs Last 12 Hrs  T(F): 98.7 (09-30-22 @ 14:36), Max: 98.7 (09-30-22 @ 14:36)  HR: 110 (09-30-22 @ 14:36) (105 - 112)  BP: 110/83 (09-30-22 @ 14:36) (110/83 - 160/83)  BP(mean): --  RR: 24 (09-30-22 @ 14:36) (20 - 24)  SpO2: 98% (09-30-22 @ 14:36) (95% - 98%)

## 2022-09-30 NOTE — CHART NOTE - NSCHARTNOTEFT_GEN_A_CORE
9/30	RRT was called 2/2 increased work of breathing, she was tachypnic, other vitals are stable, ABG shows ok, team recomm cont using BiPAP specially at night. Attending is aware. Son was called -- no response.  Amanda Sanchez (PA) [facility] # [id number]

## 2022-09-30 NOTE — PROGRESS NOTE ADULT - SUBJECTIVE AND OBJECTIVE BOX
afberile    REVIEW OF SYSTEMS:  GEN: no fever,    no chills  RESP: no SOB,   no cough  CVS: no chest pain,   no palpitations  GI: no abdominal pain,   no nausea,   no vomiting,   no constipation,   no diarrhea  : no dysuria,   no frequency  NEURO: no headache,   no dizziness  PSYCH: no depression,   not anxious  Derm : no rash    MEDICATIONS  (STANDING):  albuterol/ipratropium for Nebulization 3 milliLiter(s) Nebulizer every 6 hours  allopurinol 100 milliGRAM(s) Oral daily  anastrozole 1 milliGRAM(s) Oral daily  atorvastatin 20 milliGRAM(s) Oral at bedtime  bisacodyl 5 milliGRAM(s) Oral at bedtime  buDESOnide    Inhalation Suspension 0.5 milliGRAM(s) Inhalation every 12 hours  chlorhexidine 2% Cloths 1 Application(s) Topical <User Schedule>  dextrose 50% Injectable 25 Gram(s) IV Push once  dextrose 50% Injectable 12.5 Gram(s) IV Push once  dextrose 50% Injectable 25 Gram(s) IV Push once  dextrose Oral Gel 15 Gram(s) Oral once  digoxin     Tablet 125 MICROGram(s) Oral daily  diltiazem    Tablet 30 milliGRAM(s) Enteral Tube every 6 hours  glucagon  Injectable 1 milliGRAM(s) IntraMuscular once  guaiFENesin Oral Liquid (Sugar-Free) 300 milliGRAM(s) Oral every 6 hours  insulin lispro (ADMELOG) corrective regimen sliding scale   SubCutaneous every 6 hours  insulin NPH human recombinant 7 Unit(s) SubCutaneous every 6 hours  levETIRAcetam  Solution 750 milliGRAM(s) Oral two times a day  levothyroxine 75 MICROGram(s) Oral daily  melatonin 3 milliGRAM(s) Oral at bedtime  prednisoLONE    3 mG/mL Solution (ORAPRED) 45 milliGRAM(s) Oral daily  prednisoLONE    3 mG/mL Solution (ORAPRED)   Oral   rivaroxaban 20 milliGRAM(s) Oral with dinner  senna 2 Tablet(s) Oral at bedtime    MEDICATIONS  (PRN):  acetaminophen     Tablet .. 650 milliGRAM(s) Oral every 6 hours PRN Temp greater or equal to 38C (100.4F), Mild Pain (1 - 3)  ALPRAZolam 0.25 milliGRAM(s) Oral every 8 hours PRN anxiety  polyethylene glycol 3350 17 Gram(s) Oral daily PRN Constipation      Vital Signs Last 24 Hrs  T(C): 36.9 (30 Sep 2022 04:32), Max: 37.2 (29 Sep 2022 20:59)  T(F): 98.4 (30 Sep 2022 04:32), Max: 99 (29 Sep 2022 20:59)  HR: 105 (30 Sep 2022 06:04) (103 - 117)  BP: 122/57 (30 Sep 2022 06:47) (100/69 - 132/92)  BP(mean): --  RR: 20 (30 Sep 2022 04:32) (19 - 20)  SpO2: 95% (30 Sep 2022 06:04) (95% - 99%)    Parameters below as of 30 Sep 2022 04:32  Patient On (Oxygen Delivery Method): BiPAP/CPAP      CAPILLARY BLOOD GLUCOSE      POCT Blood Glucose.: 174 mg/dL (30 Sep 2022 06:02)  POCT Blood Glucose.: 252 mg/dL (30 Sep 2022 00:14)  POCT Blood Glucose.: 173 mg/dL (29 Sep 2022 17:40)  POCT Blood Glucose.: 145 mg/dL (29 Sep 2022 12:26)    I&O's Summary      PHYSICAL EXAM:  HEAD:  Atraumatic, Normocephalic  NECK: Supple, No   JVD  CHEST/LUNG:   no     rales,     no,    rhonchi  HEART: Regular rate and rhythm;         murmur  ABDOMEN: Soft, Nontender, ;   EXTREMITIES:   no     edema  NEUROLOGY:  alert    LABS:                        9.3    12.76 )-----------( 179      ( 28 Sep 2022 11:25 )             32.5                           Thyroid Stimulating Hormone, Serum: 0.32 uIU/mL (09-24 @ 11:34)          Consultant(s) Notes Reviewed:      Care Discussed with Consultants/Other Providers:

## 2022-10-01 NOTE — PROGRESS NOTE ADULT - ASSESSMENT
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 She also has a history of HTN, HLD, CAD s/p MI with preserved LVEF and moderate aortic stenosis. The patient was admitted to [facility] in December 2021 with fever and abdominal pain due to perforated appendicitis. She was treated with tube drainage and antibiotics for a polymicrobial infection. Hospital course was complicated by respiratory failure due to mucous plugging with complete collapse of the left lung. She underwent several bronchoscopies for pulmonary toilet and was found to have severe tracheobronchomalacia. Her respiratory status has remained "stable" while at [facility] on nebulized bronchodilators and hypertonic saline and without nocturnal NIV. She was admitted in March with hematochezia. The left lower lobe was well aerated on a CT scan of the abdomen and pelvis with only bibasilar subsegmental atelectasis - there was scattered sigmoid diverticulosis without evidence of diverticulitis - interval decrease in size of a fluid collection in the region of the previously perforated appendicitis measuring 2.8 x 1.5 cm previously measuring 4.0 x 2.2 cm - slight increase in mild adjacent inflammatory change. The GI bleed was treated conservatively.  She was admitted on 8/29 with shortness of breath in the setting of back, neck and left lower extremity pain. She required NIV for mild acute hypercapnic respiratory failure that quickly resolved. Her respiratory status remained stable on room air and on her usual nebs and mucolytics. She was started on a puree diet with moderately thickened fluids in addition to PEG feeds following evaluation by the speech and swallow team. She was started on anastrazole for likely metastatic breast cancer to the bone. On the day of discharge, the patient was quite anxious with shortness of breath, chest congestion and wheeze - she was making a grunting noise with expiration. She was returned from [facility] that evening. Currently she is tachypneic and using accessory muscles of respiration. She has an audible wheeze and cough productive of scant sputum. No fevers, chills or sweats. No chest pain/pressure or palpitations.     the patient has new onset bronchospasm with increased work of breathing and worsening left lower lobe atelectasis -  she has severe tracheobronchomalacia that has resulted in mucous plugging with complete left lung collapse requiring several bronchoscopies for pulmonary toilet in the past - the patient was felt not to be a candidate for surgery for the tracheobronchomalacia and stenting was felt to have more risk than benefit     metastatic breast cancer    9/19 -  found to be COVID positive on discharge [facility]; awake and alert; now with marked worsening of shortness of breath and using her accessory muscles of respiration; severe chest congestion and wheeze exacerbated by neck flexion; increasing pCO2, WBC and lactate; occasional cough productive of scant sputum; no fevers, chills or sweats; no chest pain/pressure or palpitations; back on PEG feeds; switched from nebs to inhalers yesterday as Dr. Dexter was informed by nursing that COVID positive patients cannot receive nebs at [facility]    9/21 - spent the day on AVAPS -> removed this AM with somewhat less increased work of breathing and decreased chest congestion and wheeze; VBG not sent this AM; in airborne and contact isolation due to hospital acquired COVID infection; awake and alert; occasional cough productive of scant sputum; no fevers, chills or sweats; no chest pain/pressure or palpitations;     9/23 - off AVAPS this AM - laying almost flat in bed with neck and back flexed; increased work of breathing; in airborne and contact isolation due to hospital acquired COVID infection; awake and alert; begging for lemonade; occasional cough productive of scant sputum; improving chest congestion or wheeze; no fevers, chills or sweats; no chest pain/pressure or palpitations; receiving PEG feeds when off NIV;    9/26 -  again laying almost flat in bed with neck and back flexed; decreased work of breathing now on a nasal canula during the day and AVAPS at night; airborne and contact isolation being discontinued after treatment for a hospital acquired COVID infection; awake and alert asking for something to eat and drink; occasional cough productive of scant sputum; improving chest congestion and wheeze; no fevers, chills or sweats; no chest pain/pressure or palpitations; receiving PEG feeds    9/30 - hoisted up in bed earlier this AM - neck flexed onto the anterior chest wall; increased tachypnea without hypoxemia on a 3lpm nasal canula today; no cough, sputum production, hemoptysis, chest congestion or wheeze; no fevers, chills or sweats; no chest pain/pressure or palpitations.  It continues      PLAN/RECOMMENDATIONS:    the patient's head needs to be elevated greater than 45 degrees at all times, she does not do well when her head is flexed anteriorly  oxygen supplementation via a nasal canula during the day to keep saturation greater than 92% - currently on a 3lpm nasal canula -> taper as tolerated  continue nocturnal BIPAP to prevent atelectasis for now, she may very well need bipap during the day if she can tolerate especially when she gets tired or short of breath  has completed a 5 day course of remdesivir  prednisolone 45mg via PEG - taper as written - will need to adjust insulin accordingly  continue budesonide/duonebs  continue guaifenesin 300mg 4 times daily via PEG  incentive spirometry   PEG feeds - see recommendations by nutrition service  oncology follow-up noted    s/p right simple mastectomy 4/2019 for breast cancer but did not receive adjuvant hormonal therapy    it is likely that the patient has metastatic breast cancer    started on anastrozole as the patient could not tolerate a MRI and is not a candidate for bone biopsy  cardiology follow-up for management of HTN, HLD, CAD, aortic stenosis and atrial fibrillation     cardiac meds: digoxin/diltiazem/xarelto  DVT prophylaxis - xarelto  glucose control  bowel regimen  analgesics  keppra    her prognosis appears to be very poor, family is unrealistic.  Should be seen by palliative care if has not done so already.        Tami Rendon MD, [facility]  [phone]  Pulmonary Medicine

## 2022-10-01 NOTE — PROGRESS NOTE ADULT - ASSESSMENT
89    year old female    h/o hemorrhagic CVA, has  PEG,  HTN, MI,   HLD, gout, right ca  breast   right Ca breast. s/p mastectomy in 2019,  s/p  sentinel node  localization.  4/4,  were  negative  s/p rrt  . in past,  for hypoxia. cxr with complete  collapsed  of  left lung, needing broch,   s p  bronchoscopy , pt  with  tracheomalacia  and  collapsed  airways,  makes  this a  difficult  situation, as there is no good rx for this     h/o bacteremia. on   pna, perforated  appendicitis,  ?  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 this will not alter rx. pt  redvd  extended  course  of ab   on iv  vanco and ertapenem.  till  2/9/.22.        *  admitted with   presumed  resp  failure/ pt was  sent back from [facility], the same day   pt seen in er.  appears   at her naseline.  no  wheezing /  atousable    ENT eval w/ laryngoscopy notable for vocal cords mobile and intact, no edema, upper airway patent     hypertonic saline for airway clearance   cxr, no pna     *   s/p cva, has  PEG,  npo  ,   on  synthroid, Keppra  ,  *   HTN, on  cardizem,  per  card dr jamison  *  h/o  Ca breast. /, s/p  R  mastectomy  *   obesity, bmi  was   41, now  is  30   *     c/c left  leg contracture ,  remains  bed  bound. functional  paraplegia,  needs  assistance  for  all her  adl's   *  pt  with  h/o  tracheomalacia  and  collapsed  airways, propensity  for  lung collpse,   pt is  at risk for  re admissions     on nocturnal  bipap,  difficult  situation, a s there  is no  good  rx  for  pt's  recurrent lung collapse hypoxia     h/o  of  chronic    mild  tachycardia          *   CT chest. lytic  lesions, T  spine/  ribs/ humerus/  oncoloigy  d r ohri.  suspect  metastatic ca breast        son. guicho Dan/ oncologist  has  also  discussed  with  son,  futility of  pursuing  bx. a s pt  is  not an ideal   candidate  for  chemo    CT  A.p ,/ prelim ,  pelvic  mets  per  oncology,   suspect  metastatic ca  breast,  was  awaiting   mri  spine/  pt unable  to tolerate  mri   per oncology, no  further  intervention/  and on  Arimidex, now,   per d r ohri   obesity,  bed bound.  functional paraplegia    per  son,   rehab promptly  returned   to er/ as they  did not  have  a  bipap machine /  care cortn to f/.p, needs  24/7  O2/ nocturnal bipap   difficult  situation,  bed  bound. obesity. c/c  hypercarbia, needing , prn /  nocturnal bipap/  with intermittent tachypnea    now  is  covid +. on iv steroid/ was  wheezing/ remdissivir/, seen by  house  ID   pt  with  frequent episodes  of tachypnea. has  obstructive /restrictive   lung  disease ,  obesity/ RAQUEL/ hypercarbia   called son, caseyple  times,  message  left/    pt  remains  at  her  baseline ,   verbal  at  times  Afib, on xarelto  20m mg qd    s/p tachycardia, on   cardizem  and digoxin/  remains on iv steroid  per  pulm    pt  with poor  access   and  IR  note  seen, pt unable to lie  supine   pt at  baseline,  has  never  been  able  to  lie  supine,  given all  her  co  morbidities, and pt  has  been  optimized   to  fullest  degree possible  very  difficult  situation   on   prednisolone,  via  peg  pt 's  situation is tenuous   there  ie  no  room  for further    intervention. , CT  chest  prelim, atelectasis  pt  with  ongoing intermittent tachycardia/ tachypnea/   unable  to  reach  son/  s/p  rrt on 9/30  for tachypnea    hypernatremia   labs  pending       per  son, pt is  full code

## 2022-10-01 NOTE — PROGRESS NOTE ADULT - SUBJECTIVE AND OBJECTIVE BOX
afberile    REVIEW OF SYSTEMS:  GEN: no fever,    no chills  RESP: no SOB,   no cough  CVS: no chest pain,   no palpitations  GI: no abdominal pain,   no nausea,   no vomiting,   no constipation,   no diarrhea  : no dysuria,   no frequency  NEURO: no headache,   no dizziness  PSYCH: no depression,   not anxious  Derm : no rash    MEDICATIONS  (STANDING):  albuterol/ipratropium for Nebulization 3 milliLiter(s) Nebulizer every 6 hours  allopurinol 100 milliGRAM(s) Oral daily  anastrozole 1 milliGRAM(s) Oral daily  atorvastatin 20 milliGRAM(s) Oral at bedtime  bisacodyl 5 milliGRAM(s) Oral at bedtime  buDESOnide    Inhalation Suspension 0.5 milliGRAM(s) Inhalation every 12 hours  chlorhexidine 2% Cloths 1 Application(s) Topical <User Schedule>  dextrose 50% Injectable 25 Gram(s) IV Push once  dextrose 50% Injectable 12.5 Gram(s) IV Push once  dextrose 50% Injectable 25 Gram(s) IV Push once  dextrose Oral Gel 15 Gram(s) Oral once  digoxin     Tablet 125 MICROGram(s) Oral daily  diltiazem    Tablet 30 milliGRAM(s) Enteral Tube every 6 hours  glucagon  Injectable 1 milliGRAM(s) IntraMuscular once  guaiFENesin Oral Liquid (Sugar-Free) 300 milliGRAM(s) Oral every 6 hours  insulin lispro (ADMELOG) corrective regimen sliding scale   SubCutaneous every 6 hours  insulin NPH human recombinant 9 Unit(s) SubCutaneous every 6 hours  levETIRAcetam  Solution 750 milliGRAM(s) Oral two times a day  levothyroxine 75 MICROGram(s) Oral daily  melatonin 3 milliGRAM(s) Oral at bedtime  prednisoLONE    3 mG/mL Solution (ORAPRED) 45 milliGRAM(s) Oral daily  prednisoLONE    3 mG/mL Solution (ORAPRED)   Oral   rivaroxaban 20 milliGRAM(s) Oral with dinner  senna 2 Tablet(s) Oral at bedtime    MEDICATIONS  (PRN):  acetaminophen     Tablet .. 650 milliGRAM(s) Oral every 6 hours PRN Temp greater or equal to 38C (100.4F), Mild Pain (1 - 3)  ALPRAZolam 0.25 milliGRAM(s) Oral every 8 hours PRN anxiety  polyethylene glycol 3350 17 Gram(s) Oral daily PRN Constipation      Vital Signs Last 24 Hrs  T(C): 37.1 (01 Oct 2022 04:46), Max: 37.1 (30 Sep 2022 14:36)  T(F): 98.7 (01 Oct 2022 04:46), Max: 98.7 (30 Sep 2022 14:36)  HR: 119 (01 Oct 2022 04:46) (102 - 119)  BP: 110/60 (01 Oct 2022 04:46) (110/60 - 160/83)  BP(mean): --  RR: 20 (01 Oct 2022 04:46) (20 - 24)  SpO2: 95% (01 Oct 2022 04:46) (95% - 99%)    Parameters below as of 01 Oct 2022 04:46  Patient On (Oxygen Delivery Method): BiPAP/CPAP      CAPILLARY BLOOD GLUCOSE      POCT Blood Glucose.: 158 mg/dL (01 Oct 2022 05:24)  POCT Blood Glucose.: 150 mg/dL (01 Oct 2022 00:09)  POCT Blood Glucose.: 161 mg/dL (30 Sep 2022 18:02)  POCT Blood Glucose.: 378 mg/dL (30 Sep 2022 13:45)  POCT Blood Glucose.: 330 mg/dL (30 Sep 2022 12:34)    I&O's Summary    30 Sep 2022 07:01  -  01 Oct 2022 07:00  --------------------------------------------------------  IN: 630 mL / OUT: 200 mL / NET: 430 mL        PHYSICAL EXAM:  HEAD:  Atraumatic, Normocephalic  NECK: Supple, No   JVD  CHEST/LUNG:   no     rales,     no,    rhonchi  HEART: Regular rate and rhythm;         murmur  ABDOMEN: Soft, Nontender, ;   EXTREMITIES:     no   edema  NEUROLOGY:  lethargic    LABS:                        See note  QNS   )-----------( QNS      ( 30 Sep 2022 08:27 )             See note    09-30    151<H>  |  114<H>  |  35<H>  ----------------------------<  210<H>  4.3   |  20<L>  |  0.63    Ca    8.7      30 Sep 2022 08:28    TPro  6.0  /  Alb  3.0<L>  /  TBili  0.5  /  DBili  x   /  AST  24  /  ALT  20  /  AlkPhos  201<H>  09-30              ABG - ( 30 Sep 2022 14:17 )  pH, Arterial: 7.47  pH, Blood: x     /  pCO2: 35    /  pO2: 134   / HCO3: 26    / Base Excess: 1.9   /  SaO2: 99.6                  Thyroid Stimulating Hormone, Serum: 0.32 uIU/mL (09-24 @ 11:34)          Consultant(s) Notes Reviewed:      Care Discussed with Consultants/Other Providers:

## 2022-10-01 NOTE — PROGRESS NOTE ADULT - SUBJECTIVE AND OBJECTIVE BOX
Follow-up Pulm Progress Note - [facility] [place]    Events of yesterday noted, with increased work of breathing, wheeze again, requiring niv.    Medications:  MEDICATIONS  (STANDING):  albuterol/ipratropium for Nebulization 3 milliLiter(s) Nebulizer every 6 hours  allopurinol 100 milliGRAM(s) Oral daily  anastrozole 1 milliGRAM(s) Oral daily  atorvastatin 20 milliGRAM(s) Oral at bedtime  bisacodyl 5 milliGRAM(s) Oral at bedtime  buDESOnide    Inhalation Suspension 0.5 milliGRAM(s) Inhalation every 12 hours  chlorhexidine 2% Cloths 1 Application(s) Topical <User Schedule>  dextrose 5%. 1000 milliLiter(s) (50 mL/Hr) IV Continuous <Continuous>  dextrose 50% Injectable 25 Gram(s) IV Push once  dextrose 50% Injectable 12.5 Gram(s) IV Push once  dextrose 50% Injectable 25 Gram(s) IV Push once  dextrose Oral Gel 15 Gram(s) Oral once  digoxin     Tablet 125 MICROGram(s) Oral daily  diltiazem    Tablet 30 milliGRAM(s) Enteral Tube every 6 hours  glucagon  Injectable 1 milliGRAM(s) IntraMuscular once  guaiFENesin Oral Liquid (Sugar-Free) 300 milliGRAM(s) Oral every 6 hours  insulin lispro (ADMELOG) corrective regimen sliding scale   SubCutaneous every 6 hours  insulin NPH human recombinant 9 Unit(s) SubCutaneous every 6 hours  levETIRAcetam  Solution 750 milliGRAM(s) Oral two times a day  levothyroxine 75 MICROGram(s) Oral daily  melatonin 3 milliGRAM(s) Oral at bedtime  prednisoLONE    3 mG/mL Solution (ORAPRED) 45 milliGRAM(s) Oral daily  prednisoLONE    3 mG/mL Solution (ORAPRED)   Oral   rivaroxaban 20 milliGRAM(s) Oral with dinner  senna 2 Tablet(s) Oral at bedtime    MEDICATIONS  (PRN):  acetaminophen     Tablet .. 650 milliGRAM(s) Oral every 6 hours PRN Temp greater or equal to 38C (100.4F), Mild Pain (1 - 3)  ALPRAZolam 0.25 milliGRAM(s) Oral every 8 hours PRN anxiety  polyethylene glycol 3350 17 Gram(s) Oral daily PRN Constipation             Vital Signs Last 24 Hrs  T(C): 37 (01 Oct 2022 12:31), Max: 37.1 (30 Sep 2022 14:36)  T(F): 98.6 (01 Oct 2022 12:31), Max: 98.7 (30 Sep 2022 14:36)  HR: 60 (01 Oct 2022 12:31) (60 - 119)  BP: 127/98 (01 Oct 2022 12:31) (110/60 - 127/98)  BP(mean): --  RR: 20 (01 Oct 2022 12:31) (20 - 24)  SpO2: 92% (01 Oct 2022 12:31) (92% - 99%)    Parameters below as of 01 Oct 2022 12:31  Patient On (Oxygen Delivery Method): nasal cannula  O2 Flow (L/min): 3      ABG - ( 30 Sep 2022 14:17 )  pH, Arterial: 7.47  pH, Blood: x     /  pCO2: 35    /  pO2: 134   / HCO3: 26    / Base Excess: 1.9   /  SaO2: 99.6                  09-30 @ 07:01  -  10-01 @ 07:00  --------------------------------------------------------  IN: 630 mL / OUT: 200 mL / NET: 430 mL          LABS:                        See note  QNS   )-----------( QNS      ( 30 Sep 2022 08:27 )             See note    09-30    151<H>  |  114<H>  |  35<H>  ----------------------------<  210<H>  4.3   |  20<L>  |  0.63    Ca    8.7      30 Sep 2022 08:28    TPro  6.0  /  Alb  3.0<L>  /  TBili  0.5  /  DBili  x   /  AST  24  /  ALT  20  /  AlkPhos  201<H>  09-30        CAPILLARY BLOOD GLUCOSE      POCT Blood Glucose.: 265 mg/dL (01 Oct 2022 12:28)              CULTURES:        Physical Examination:  Awake and alert  Normocephalic atraumatic  NECK: supple, normal range of motion, using accessory muscles   PULM: Clear to auscultation bilaterally, with expiratory wheeze  CVS: Regular rate and rhythm, no murmurs, rubs, or gallops  Abd:  soft, non tender, obese  Extrem: No CC, 1+ edema

## 2022-10-01 NOTE — PROGRESS NOTE ADULT - ASSESSMENT
Assessment  DMT2: 87y Female with DM T2 with hyperglycemia, A1C 7%, on NPH insulin and coverage, blood sugars are improving, no hypoglycemias, on peg tube feeds, on prednisolone.  Covid: on medications, stable, monitored.  Hypothyroidism: On Synthroid 75 mcg po daily, ?compliance, euthyroid.  HLD: on statin.        Jeanie Gao MD  Cell: [phone]  Office: [phone]

## 2022-10-01 NOTE — PROGRESS NOTE ADULT - SUBJECTIVE AND OBJECTIVE BOX
Chief complaint    Patient is a 87y old  Female who presents with a chief complaint of SOB (01 Oct 2022 13:02)   Review of systems  Patient in bed, appears comfortable.    Labs and Fingersticks  CAPILLARY BLOOD GLUCOSE      POCT Blood Glucose.: 227 mg/dL (01 Oct 2022 16:26)  POCT Blood Glucose.: 265 mg/dL (01 Oct 2022 12:28)  POCT Blood Glucose.: 195 mg/dL (01 Oct 2022 08:22)  POCT Blood Glucose.: 158 mg/dL (01 Oct 2022 05:24)  POCT Blood Glucose.: 150 mg/dL (01 Oct 2022 00:09)      Anion Gap, Serum: 17 (09-30 @ 08:28)      Calcium, Total Serum: 8.7 (09-30 @ 08:28)  Albumin, Serum: 3.0 *L* (09-30 @ 08:28)    Alanine Aminotransferase (ALT/SGPT): 20 (09-30 @ 08:28)  Alkaline Phosphatase, Serum: 201 *H* (09-30 @ 08:28)  Aspartate Aminotransferase (AST/SGOT): 24 (09-30 @ 08:28)        09-30    151<H>  |  114<H>  |  35<H>  ----------------------------<  210<H>  4.3   |  20<L>  |  0.63    Ca    8.7      30 Sep 2022 08:28    TPro  6.0  /  Alb  3.0<L>  /  TBili  0.5  /  DBili  x   /  AST  24  /  ALT  20  /  AlkPhos  201<H>  09-30                        See note  QNS   )-----------( QNS      ( 30 Sep 2022 08:27 )             See note    Medications  MEDICATIONS  (STANDING):  albuterol/ipratropium for Nebulization 3 milliLiter(s) Nebulizer every 6 hours  allopurinol 100 milliGRAM(s) Oral daily  anastrozole 1 milliGRAM(s) Oral daily  atorvastatin 20 milliGRAM(s) Oral at bedtime  bisacodyl 5 milliGRAM(s) Oral at bedtime  buDESOnide    Inhalation Suspension 0.5 milliGRAM(s) Inhalation every 12 hours  chlorhexidine 2% Cloths 1 Application(s) Topical <User Schedule>  dextrose 5%. 1000 milliLiter(s) (50 mL/Hr) IV Continuous <Continuous>  dextrose 50% Injectable 25 Gram(s) IV Push once  dextrose 50% Injectable 12.5 Gram(s) IV Push once  dextrose 50% Injectable 25 Gram(s) IV Push once  dextrose Oral Gel 15 Gram(s) Oral once  digoxin     Tablet 125 MICROGram(s) Oral daily  diltiazem    Tablet 30 milliGRAM(s) Enteral Tube every 6 hours  glucagon  Injectable 1 milliGRAM(s) IntraMuscular once  guaiFENesin Oral Liquid (Sugar-Free) 300 milliGRAM(s) Oral every 6 hours  insulin lispro (ADMELOG) corrective regimen sliding scale   SubCutaneous every 6 hours  insulin NPH human recombinant 9 Unit(s) SubCutaneous every 6 hours  levETIRAcetam  Solution 750 milliGRAM(s) Oral two times a day  levothyroxine 75 MICROGram(s) Oral daily  melatonin 3 milliGRAM(s) Oral at bedtime  prednisoLONE    3 mG/mL Solution (ORAPRED) 45 milliGRAM(s) Oral daily  prednisoLONE    3 mG/mL Solution (ORAPRED)   Oral   rivaroxaban 20 milliGRAM(s) Oral with dinner  senna 2 Tablet(s) Oral at bedtime      Physical Exam  General: Patient comfortable in bed  Vital Signs Last 12 Hrs  T(F): 98.6 (10-01-22 @ 12:31), Max: 98.6 (10-01-22 @ 12:31)  HR: 107 (10-01-22 @ 17:12) (60 - 110)  BP: 127/98 (10-01-22 @ 12:31) (127/98 - 127/98)  BP(mean): --  RR: 20 (10-01-22 @ 12:31) (20 - 20)  SpO2: 99% (10-01-22 @ 17:12) (92% - 99%)  Neck: No palpable thyroid nodules.  CVS: S1S2, No murmurs  Respiratory: No wheezing, no crepitations  GI: Abdomen soft, bowel sounds positive  Musculoskeletal:  edema lower extremities.     Diagnostics    Free Thyroxine, Serum: AM Sched. Collection: 01-Oct-2022 06:00 (09-30 @ 15:01)  Free Thyroxine, Serum: AM Sched. Collection: 24-Sep-2022 06:00  Cancel Reason: Dup Cancel (09-23 @ 12:07)  Thyroid Stimulating Hormone, Serum: AM Sched. Collection: 24-Sep-2022 06:00 (09-23 @ 12:07)  A1C with Estimated Average Glucose: Routine (09-23 @ 12:06)  A1C with Estimated Average Glucose: Routine  Cancel Reason: Lab Operations Cancel (09-23 @ 11:58)  A1C with Estimated Average Glucose: Routine (09-23 @ 10:31)  Free Thyroxine, Serum: AM Sched. Collection: 24-Sep-2022 06:00 (09-23 @ 09:23)  Thyroid Stimulating Hormone, Serum: AM Sched. Collection: 24-Sep-2022 06:00 (09-23 @ 09:23)

## 2022-10-01 NOTE — PROGRESS NOTE ADULT - SUBJECTIVE AND OBJECTIVE BOX
CARDIOLOGY     PROGRESS  NOTE   ________________________________________________    CHIEF COMPLAINT:Patient is a 87y old  Female who presents with a chief complaint of SOB (01 Oct 2022 07:33)  no complain  	  REVIEW OF SYSTEMS:  CONSTITUTIONAL: No fever, weight loss, or fatigue  EYES: No eye pain, visual disturbances, or discharge  ENT:  No difficulty hearing, tinnitus, vertigo; No sinus or throat pain  NECK: No pain or stiffness  RESPIRATORY: No cough, wheezing, chills or hemoptysis; + Shortness of Breath  CARDIOVASCULAR: No chest pain, palpitations, passing out, dizziness, or leg swelling  GASTROINTESTINAL: No abdominal or epigastric pain. No nausea, vomiting, or hematemesis; No diarrhea or constipation. No melena or hematochezia.  GENITOURINARY: No dysuria, frequency, hematuria, or incontinence  NEUROLOGICAL: No headaches, memory loss, loss of strength, numbness, or tremors  SKIN: No itching, burning, rashes, or lesions   LYMPH Nodes: No enlarged glands  ENDOCRINE: No heat or cold intolerance; No hair loss  MUSCULOSKELETAL: No joint pain or swelling; No muscle, back, or extremity pain  PSYCHIATRIC: No depression, anxiety, mood swings, or difficulty sleeping  HEME/LYMPH: No easy bruising, or bleeding gums  ALLERGY AND IMMUNOLOGIC: No hives or eczema	    [x ] All others negative	  [ ] Unable to obtain    PHYSICAL EXAM:  T(C): 37.1 (10-01-22 @ 04:46), Max: 37.1 (09-30-22 @ 14:36)  HR: 119 (10-01-22 @ 04:46) (102 - 119)  BP: 110/60 (10-01-22 @ 04:46) (110/60 - 160/83)  RR: 20 (10-01-22 @ 04:46) (20 - 24)  SpO2: 95% (10-01-22 @ 04:46) (95% - 99%)  Wt(kg): --  I&O's Summary    30 Sep 2022 07:01  -  01 Oct 2022 07:00  --------------------------------------------------------  IN: 630 mL / OUT: 200 mL / NET: 430 mL        Appearance: Normal	  HEENT:   Normal oral mucosa, PERRL, EOMI	  Lymphatic: No lymphadenopathy  Cardiovascular: Normal S1 S2, No JVD, + murmurs, No edema  Respiratory: rhonchi  Psychiatry: A & O x 3, Mood & affect appropriate  Gastrointestinal:  Soft, Non-tender, + BS	  Skin: No rashes, No ecchymoses, No cyanosis	  Neurologic: Non-focal  Extremities: Normal range of motion, No clubbing, cyanosis or edema  Vascular: Peripheral pulses palpable 2+ bilaterally    MEDICATIONS  (STANDING):  albuterol/ipratropium for Nebulization 3 milliLiter(s) Nebulizer every 6 hours  allopurinol 100 milliGRAM(s) Oral daily  anastrozole 1 milliGRAM(s) Oral daily  atorvastatin 20 milliGRAM(s) Oral at bedtime  bisacodyl 5 milliGRAM(s) Oral at bedtime  buDESOnide    Inhalation Suspension 0.5 milliGRAM(s) Inhalation every 12 hours  chlorhexidine 2% Cloths 1 Application(s) Topical <User Schedule>  dextrose 5%. 1000 milliLiter(s) (50 mL/Hr) IV Continuous <Continuous>  dextrose 50% Injectable 25 Gram(s) IV Push once  dextrose 50% Injectable 12.5 Gram(s) IV Push once  dextrose 50% Injectable 25 Gram(s) IV Push once  dextrose Oral Gel 15 Gram(s) Oral once  digoxin     Tablet 125 MICROGram(s) Oral daily  diltiazem    Tablet 30 milliGRAM(s) Enteral Tube every 6 hours  glucagon  Injectable 1 milliGRAM(s) IntraMuscular once  guaiFENesin Oral Liquid (Sugar-Free) 300 milliGRAM(s) Oral every 6 hours  insulin lispro (ADMELOG) corrective regimen sliding scale   SubCutaneous every 6 hours  insulin NPH human recombinant 9 Unit(s) SubCutaneous every 6 hours  levETIRAcetam  Solution 750 milliGRAM(s) Oral two times a day  levothyroxine 75 MICROGram(s) Oral daily  melatonin 3 milliGRAM(s) Oral at bedtime  prednisoLONE    3 mG/mL Solution (ORAPRED) 45 milliGRAM(s) Oral daily  prednisoLONE    3 mG/mL Solution (ORAPRED)   Oral   rivaroxaban 20 milliGRAM(s) Oral with dinner  senna 2 Tablet(s) Oral at bedtime      TELEMETRY: 	    ECG:  	  RADIOLOGY:  OTHER: 	  	  LABS:	 	    CARDIAC MARKERS:                                See note  QNS   )-----------( QNS      ( 30 Sep 2022 08:27 )             See note    09-30    151<H>  |  114<H>  |  35<H>  ----------------------------<  210<H>  4.3   |  20<L>  |  0.63    Ca    8.7      30 Sep 2022 08:28    TPro  6.0  /  Alb  3.0<L>  /  TBili  0.5  /  DBili  x   /  AST  24  /  ALT  20  /  AlkPhos  201<H>  09-30    proBNP: Serum Pro-Brain Natriuretic Peptide: 461 pg/mL (09-08 @ 20:56)    Lipid Profile:   HgA1c:   TSH: Thyroid Stimulating Hormone, Serum: 0.32 uIU/mL (09-24 @ 11:34)  Thyroid Stimulating Hormone, Serum: 0.30 uIU/mL (09-24 @ 11:34)          Assessment and plan  ---------------------------  86F w/ extensive hx including severe tracheobronchomalacia (c/b hypoxia 2/2 mucous plugging and recurrent L lung collapse), 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 Very limited hx obtained from pt given mental status, dyspnea, and use of BIPAP. Unable to reach sonSy. History obtained from staff/chart review.    Per ED staff who saw pt when she first arrived, pt was notably dyspneic with increased WOB and wheezing. Supposedly missed use of nightly BIPAP at rehab. Reportedly denied fever and cough. On encounter for admission, pt with BIPAP mask on, appearing slightly lethargic and mildly dyspneic, but was able to answer some questions. Pt endorsed having SOB. Denied pain. Seemed to believe initially that she was at [facility] rehab? but was difficult to understand her responses.    Of note, pt has multiple recent admissions with similar presentation. Most recently was hospitalized from 8/28/22-9/8/22 for SOB, treated with airway clearance and completed 5-day course of Zosyn for empiric coverage of PNA (though per Pulm, unlikely to have PNA). Course c/b PEG displacement and subsequent reinsertion by IR on 8/30. In addition to her usual PEG feeds, pt was also started on puree diet with moderately thickened fluids for pleasure feeds. Also was started on anastrozole for most likely dx of metastatic breast cancer to bones (pt was unable to tolerate further cancer workup of spine lesions). Pt remained full code during recent admissions.  pt with metastatic ca to the bone with sob sec to obesity and underlying lung and cardiac disease  increase sob sec to missing BiPAP at night  may consider pulmonary eval  may consider hospice care/ palliative care  dvt prophylaxis high risk on xarelto  pt with bone mets, no further nieto/ onc noted  on steroid as per pulmonary  a.fib on 09/23/increase Cardizem dose, will add digoxin  may increase Xarelto dose sec to a.fib  add digoxin/converted to NSR  change dig to po  remained in nsr, dc tele pt non compliant   will increase cardizem dose as tolerated

## 2022-10-02 NOTE — PROGRESS NOTE ADULT - SUBJECTIVE AND OBJECTIVE BOX
afberile  REVIEW OF SYSTEMS:  GEN: no fever,    no chills  RESP: no SOB,   no cough  CVS: no chest pain,   no palpitations  GI: no abdominal pain,   no nausea,   no vomiting,   no constipation,   no diarrhea  : no dysuria,   no frequency  NEURO: no headache,   no dizziness  PSYCH: no depression,   not anxious  Derm : no rash    MEDICATIONS  (STANDING):  albuterol/ipratropium for Nebulization 3 milliLiter(s) Nebulizer every 6 hours  allopurinol 100 milliGRAM(s) Oral daily  anastrozole 1 milliGRAM(s) Oral daily  atorvastatin 20 milliGRAM(s) Oral at bedtime  bisacodyl 5 milliGRAM(s) Oral at bedtime  buDESOnide    Inhalation Suspension 0.5 milliGRAM(s) Inhalation every 12 hours  chlorhexidine 2% Cloths 1 Application(s) Topical <User Schedule>  dextrose 5%. 1000 milliLiter(s) (50 mL/Hr) IV Continuous <Continuous>  dextrose 50% Injectable 25 Gram(s) IV Push once  dextrose 50% Injectable 12.5 Gram(s) IV Push once  dextrose 50% Injectable 25 Gram(s) IV Push once  dextrose Oral Gel 15 Gram(s) Oral once  digoxin     Tablet 125 MICROGram(s) Oral daily  diltiazem    Tablet 30 milliGRAM(s) Enteral Tube every 6 hours  glucagon  Injectable 1 milliGRAM(s) IntraMuscular once  guaiFENesin Oral Liquid (Sugar-Free) 300 milliGRAM(s) Oral every 6 hours  insulin lispro (ADMELOG) corrective regimen sliding scale   SubCutaneous every 6 hours  insulin NPH human recombinant 9 Unit(s) SubCutaneous every 6 hours  levETIRAcetam  Solution 750 milliGRAM(s) Oral two times a day  levothyroxine 75 MICROGram(s) Oral daily  melatonin 3 milliGRAM(s) Oral at bedtime  prednisoLONE    3 mG/mL Solution (ORAPRED) 45 milliGRAM(s) Oral daily  prednisoLONE    3 mG/mL Solution (ORAPRED)   Oral   rivaroxaban 20 milliGRAM(s) Oral with dinner  senna 2 Tablet(s) Oral at bedtime    MEDICATIONS  (PRN):  acetaminophen     Tablet .. 650 milliGRAM(s) Oral every 6 hours PRN Temp greater or equal to 38C (100.4F), Mild Pain (1 - 3)  ALPRAZolam 0.25 milliGRAM(s) Oral every 8 hours PRN anxiety  polyethylene glycol 3350 17 Gram(s) Oral daily PRN Constipation      Vital Signs Last 24 Hrs  T(C): 36.7 (02 Oct 2022 04:26), Max: 37 (01 Oct 2022 12:31)  T(F): 98.1 (02 Oct 2022 04:26), Max: 98.6 (01 Oct 2022 12:31)  HR: 105 (02 Oct 2022 06:49) (60 - 110)  BP: 110/62 (02 Oct 2022 04:26) (110/62 - 127/98)  BP(mean): --  RR: 20 (02 Oct 2022 04:26) (20 - 20)  SpO2: 98% (02 Oct 2022 06:49) (92% - 100%)    Parameters below as of 02 Oct 2022 04:26  Patient On (Oxygen Delivery Method): BiPAP/CPAP      CAPILLARY BLOOD GLUCOSE      POCT Blood Glucose.: 190 mg/dL (01 Oct 2022 23:52)  POCT Blood Glucose.: 189 mg/dL (01 Oct 2022 21:14)  POCT Blood Glucose.: 227 mg/dL (01 Oct 2022 16:26)  POCT Blood Glucose.: 265 mg/dL (01 Oct 2022 12:28)    I&O's Summary    01 Oct 2022 07:01  -  02 Oct 2022 07:00  --------------------------------------------------------  IN: 980 mL / OUT: 624 mL / NET: 356 mL        PHYSICAL EXAM:  HEAD:  Atraumatic, Normocephalic  NECK: Supple, No   JVD  CHEST/LUNG:   no     rales,     no,    rhonchi  HEART: Regular rate and rhythm;         murmur  ABDOMEN: Soft, Nontender, ;   EXTREMITIES:      no  edema  NEUROLOGY:  ariusable    LABS:                    ABG - ( 30 Sep 2022 14:17 )  pH, Arterial: 7.47  pH, Blood: x     /  pCO2: 35    /  pO2: 134   / HCO3: 26    / Base Excess: 1.9   /  SaO2: 99.6                  Thyroid Stimulating Hormone, Serum: 0.32 uIU/mL (09-24 @ 11:34)          Consultant(s) Notes Reviewed:      Care Discussed with Consultants/Other Providers:

## 2022-10-02 NOTE — PROGRESS NOTE ADULT - SUBJECTIVE AND OBJECTIVE BOX
CARDIOLOGY     PROGRESS  NOTE   ________________________________________________    CHIEF COMPLAINT:Patient is a 87y old  Female who presents with a chief complaint of SOB (01 Oct 2022 19:10)  no complain  	  REVIEW OF SYSTEMS:  CONSTITUTIONAL: No fever, weight loss, or fatigue  EYES: No eye pain, visual disturbances, or discharge  ENT:  No difficulty hearing, tinnitus, vertigo; No sinus or throat pain  NECK: No pain or stiffness  RESPIRATORY: No cough, wheezing, chills or hemoptysis; + Shortness of Breath  CARDIOVASCULAR: No chest pain, palpitations, passing out, dizziness, or leg swelling  GASTROINTESTINAL: No abdominal or epigastric pain. No nausea, vomiting, or hematemesis; No diarrhea or constipation. No melena or hematochezia.  GENITOURINARY: No dysuria, frequency, hematuria, or incontinence  NEUROLOGICAL: No headaches, memory loss, loss of strength, numbness, or tremors  SKIN: No itching, burning, rashes, or lesions   LYMPH Nodes: No enlarged glands  ENDOCRINE: No heat or cold intolerance; No hair loss  MUSCULOSKELETAL: No joint pain or swelling; No muscle, back, or extremity pain  PSYCHIATRIC: No depression, anxiety, mood swings, or difficulty sleeping  HEME/LYMPH: No easy bruising, or bleeding gums  ALLERGY AND IMMUNOLOGIC: No hives or eczema	    [ ] All others negative	  [ ] Unable to obtain    PHYSICAL EXAM:  T(C): 36.7 (10-02-22 @ 04:26), Max: 37 (10-01-22 @ 12:31)  HR: 105 (10-02-22 @ 06:49) (60 - 110)  BP: 110/62 (10-02-22 @ 04:26) (110/62 - 127/98)  RR: 20 (10-02-22 @ 04:26) (20 - 20)  SpO2: 98% (10-02-22 @ 06:49) (92% - 100%)  Wt(kg): --  I&O's Summary    01 Oct 2022 07:01  -  02 Oct 2022 07:00  --------------------------------------------------------  IN: 980 mL / OUT: 624 mL / NET: 356 mL        Appearance: Normal	  HEENT:   Normal oral mucosa, PERRL, EOMI	  Lymphatic: No lymphadenopathy  Cardiovascular: Normal S1 S2, No JVD, + murmurs, + edema  Respiratory: rhonchi  Gastrointestinal:  Soft, Non-tender, + BS	  Skin: No rashes, No ecchymoses, No cyanosis	  Neurologic: Non-focal  Extremities: Normal range of motion, No clubbing, cyanosis + edema  Vascular: Peripheral pulses palpable 2+ bilaterally    MEDICATIONS  (STANDING):  albuterol/ipratropium for Nebulization 3 milliLiter(s) Nebulizer every 6 hours  allopurinol 100 milliGRAM(s) Oral daily  anastrozole 1 milliGRAM(s) Oral daily  atorvastatin 20 milliGRAM(s) Oral at bedtime  bisacodyl 5 milliGRAM(s) Oral at bedtime  buDESOnide    Inhalation Suspension 0.5 milliGRAM(s) Inhalation every 12 hours  chlorhexidine 2% Cloths 1 Application(s) Topical <User Schedule>  dextrose 5%. 1000 milliLiter(s) (50 mL/Hr) IV Continuous <Continuous>  dextrose 50% Injectable 12.5 Gram(s) IV Push once  dextrose 50% Injectable 25 Gram(s) IV Push once  dextrose 50% Injectable 25 Gram(s) IV Push once  dextrose Oral Gel 15 Gram(s) Oral once  digoxin     Tablet 125 MICROGram(s) Oral daily  diltiazem    Tablet 30 milliGRAM(s) Enteral Tube every 6 hours  glucagon  Injectable 1 milliGRAM(s) IntraMuscular once  guaiFENesin Oral Liquid (Sugar-Free) 300 milliGRAM(s) Oral every 6 hours  insulin lispro (ADMELOG) corrective regimen sliding scale   SubCutaneous every 6 hours  insulin NPH human recombinant 9 Unit(s) SubCutaneous every 6 hours  levETIRAcetam  Solution 750 milliGRAM(s) Oral two times a day  levothyroxine 75 MICROGram(s) Oral daily  melatonin 3 milliGRAM(s) Oral at bedtime  prednisoLONE    3 mG/mL Solution (ORAPRED) 45 milliGRAM(s) Oral daily  prednisoLONE    3 mG/mL Solution (ORAPRED)   Oral   rivaroxaban 20 milliGRAM(s) Oral with dinner  senna 2 Tablet(s) Oral at bedtime      TELEMETRY: 	    ECG:  	  RADIOLOGY:  OTHER: 	  	  LABS:	 	    CARDIAC MARKERS:      proBNP: Serum Pro-Brain Natriuretic Peptide: 461 pg/mL (09-08 @ 20:56)    Lipid Profile:   HgA1c:   TSH: Thyroid Stimulating Hormone, Serum: 0.32 uIU/mL (09-24 @ 11:34)  Thyroid Stimulating Hormone, Serum: 0.30 uIU/mL (09-24 @ 11:34)          Assessment and plan  ---------------------------  86F w/ extensive hx including severe tracheobronchomalacia (c/b hypoxia 2/2 mucous plugging and recurrent L lung collapse), 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 Very limited hx obtained from pt given mental status, dyspnea, and use of BIPAP. Unable to reach sonSy. History obtained from staff/chart review.    Per ED staff who saw pt when she first arrived, pt was notably dyspneic with increased WOB and wheezing. Supposedly missed use of nightly BIPAP at rehab. Reportedly denied fever and cough. On encounter for admission, pt with BIPAP mask on, appearing slightly lethargic and mildly dyspneic, but was able to answer some questions. Pt endorsed having SOB. Denied pain. Seemed to believe initially that she was at [facility]? but was difficult to understand her responses.    Of note, pt has multiple recent admissions with similar presentation. Most recently was hospitalized from 8/28/22-9/8/22 for SOB, treated with airway clearance and completed 5-day course of Zosyn for empiric coverage of PNA (though per Pulm, unlikely to have PNA). Course c/b PEG displacement and subsequent reinsertion by IR on 8/30. In addition to her usual PEG feeds, pt was also started on puree diet with moderately thickened fluids for pleasure feeds. Also was started on anastrozole for most likely dx of metastatic breast cancer to bones (pt was unable to tolerate further cancer workup of spine lesions). Pt remained full code during recent admissions.  pt with metastatic ca to the bone with sob sec to obesity and underlying lung and cardiac disease  increase sob sec to missing BiPAP at night  may consider pulmonary eval  may consider hospice care/ palliative care  dvt prophylaxis high risk on xarelto  pt with bone mets, no further nieto/ onc noted  on steroid as per pulmonary  a.fib on 09/23/increase Cardizem dose, will add digoxin  may increase Xarelto dose sec to a.fib  add digoxin/converted to NSR  change dig to po  remained in nsr, dc tele pt non compliant   will increase cardizem dose as tolerated  palliative care

## 2022-10-02 NOTE — PROGRESS NOTE ADULT - SUBJECTIVE AND OBJECTIVE BOX
Chief complaint  Patient is a 87y old  Female who presents with a chief complaint of SOB (02 Oct 2022 11:49)   Review of systems  Patient in bed, NAD, no hypoglycemic episodes.    Labs and Fingersticks  CAPILLARY BLOOD GLUCOSE      POCT Blood Glucose.: 314 mg/dL (02 Oct 2022 11:22)  POCT Blood Glucose.: 183 mg/dL (02 Oct 2022 05:05)  POCT Blood Glucose.: 190 mg/dL (01 Oct 2022 23:52)  POCT Blood Glucose.: 189 mg/dL (01 Oct 2022 21:14)  POCT Blood Glucose.: 227 mg/dL (01 Oct 2022 16:26)      Anion Gap, Serum: 18 *H* (10-02 @ 09:51)      Calcium, Total Serum: 8.6 (10-02 @ 09:51)          10-02    145  |  107  |  37<H>  ----------------------------<  236<H>  4.9   |  20<L>  |  0.79    Ca    8.6      02 Oct 2022 09:51      Medications  MEDICATIONS  (STANDING):  albuterol/ipratropium for Nebulization 3 milliLiter(s) Nebulizer every 6 hours  allopurinol 100 milliGRAM(s) Oral daily  anastrozole 1 milliGRAM(s) Oral daily  atorvastatin 20 milliGRAM(s) Oral at bedtime  bisacodyl 5 milliGRAM(s) Oral at bedtime  buDESOnide    Inhalation Suspension 0.5 milliGRAM(s) Inhalation every 12 hours  chlorhexidine 2% Cloths 1 Application(s) Topical <User Schedule>  dextrose 5%. 1000 milliLiter(s) (50 mL/Hr) IV Continuous <Continuous>  dextrose 50% Injectable 25 Gram(s) IV Push once  dextrose 50% Injectable 25 Gram(s) IV Push once  dextrose 50% Injectable 12.5 Gram(s) IV Push once  dextrose Oral Gel 15 Gram(s) Oral once  digoxin     Tablet 125 MICROGram(s) Oral daily  diltiazem    Tablet 30 milliGRAM(s) Enteral Tube every 6 hours  glucagon  Injectable 1 milliGRAM(s) IntraMuscular once  guaiFENesin Oral Liquid (Sugar-Free) 300 milliGRAM(s) Oral every 6 hours  insulin lispro (ADMELOG) corrective regimen sliding scale   SubCutaneous every 6 hours  insulin NPH human recombinant 10 Unit(s) SubCutaneous every 6 hours  levETIRAcetam  Solution 750 milliGRAM(s) Oral two times a day  levothyroxine 75 MICROGram(s) Oral daily  melatonin 3 milliGRAM(s) Oral at bedtime  prednisoLONE    3 mG/mL Solution (ORAPRED) 45 milliGRAM(s) Oral daily  prednisoLONE    3 mG/mL Solution (ORAPRED)   Oral   rivaroxaban 20 milliGRAM(s) Oral with dinner  senna 2 Tablet(s) Oral at bedtime      Physical Exam  General: Patient comfortable in bed  Vital Signs Last 12 Hrs  T(F): 98.3 (10-02-22 @ 11:54), Max: 98.3 (10-02-22 @ 11:54)  HR: 93 (10-02-22 @ 11:54) (93 - 110)  BP: 109/46 (10-02-22 @ 11:54) (109/46 - 110/62)  BP(mean): --  RR: 20 (10-02-22 @ 11:54) (20 - 20)  SpO2: 91% (10-02-22 @ 11:54) (91% - 100%)         Chief complaint  Patient is a 87y old  Female who presents with a chief complaint of SOB (02 Oct 2022 11:49)   Review of systems  Patient in bed, NAD, no hypoglycemic episodes.    Labs and Fingersticks  CAPILLARY BLOOD GLUCOSE      POCT Blood Glucose.: 314 mg/dL (02 Oct 2022 11:22)  POCT Blood Glucose.: 183 mg/dL (02 Oct 2022 05:05)  POCT Blood Glucose.: 190 mg/dL (01 Oct 2022 23:52)  POCT Blood Glucose.: 189 mg/dL (01 Oct 2022 21:14)  POCT Blood Glucose.: 227 mg/dL (01 Oct 2022 16:26)      Anion Gap, Serum: 18 *H* (10-02 @ 09:51)      Calcium, Total Serum: 8.6 (10-02 @ 09:51)          10-02    145  |  107  |  37<H>  ----------------------------<  236<H>  4.9   |  20<L>  |  0.79    Ca    8.6      02 Oct 2022 09:51      Medications  MEDICATIONS  (STANDING):  albuterol/ipratropium for Nebulization 3 milliLiter(s) Nebulizer every 6 hours  allopurinol 100 milliGRAM(s) Oral daily  anastrozole 1 milliGRAM(s) Oral daily  atorvastatin 20 milliGRAM(s) Oral at bedtime  bisacodyl 5 milliGRAM(s) Oral at bedtime  buDESOnide    Inhalation Suspension 0.5 milliGRAM(s) Inhalation every 12 hours  chlorhexidine 2% Cloths 1 Application(s) Topical <User Schedule>  dextrose 5%. 1000 milliLiter(s) (50 mL/Hr) IV Continuous <Continuous>  dextrose 50% Injectable 25 Gram(s) IV Push once  dextrose 50% Injectable 25 Gram(s) IV Push once  dextrose 50% Injectable 12.5 Gram(s) IV Push once  dextrose Oral Gel 15 Gram(s) Oral once  digoxin     Tablet 125 MICROGram(s) Oral daily  diltiazem    Tablet 30 milliGRAM(s) Enteral Tube every 6 hours  glucagon  Injectable 1 milliGRAM(s) IntraMuscular once  guaiFENesin Oral Liquid (Sugar-Free) 300 milliGRAM(s) Oral every 6 hours  insulin lispro (ADMELOG) corrective regimen sliding scale   SubCutaneous every 6 hours  insulin NPH human recombinant 10 Unit(s) SubCutaneous every 6 hours  levETIRAcetam  Solution 750 milliGRAM(s) Oral two times a day  levothyroxine 75 MICROGram(s) Oral daily  melatonin 3 milliGRAM(s) Oral at bedtime  prednisoLONE    3 mG/mL Solution (ORAPRED) 45 milliGRAM(s) Oral daily  prednisoLONE    3 mG/mL Solution (ORAPRED)   Oral   rivaroxaban 20 milliGRAM(s) Oral with dinner  senna 2 Tablet(s) Oral at bedtime      Physical Exam  General: Patient comfortable in bed  Vital Signs Last 12 Hrs  T(F): 98.3 (10-02-22 @ 11:54), Max: 98.3 (10-02-22 @ 11:54)  HR: 93 (10-02-22 @ 11:54) (93 - 110)  BP: 109/46 (10-02-22 @ 11:54) (109/46 - 110/62)  BP(mean): --  RR: 20 (10-02-22 @ 11:54) (20 - 20)  SpO2: 91% (10-02-22 @ 11:54) (91% - 100%)

## 2022-10-02 NOTE — PROGRESS NOTE ADULT - ASSESSMENT
Assessment  DMT2: 87y Female with DM T2 with hyperglycemia, A1C 7%, on NPH insulin and coverage, blood sugars are fluctuating/running high today, no hypoglycemias, on peg tube feeds, NAD, remains on prednisolone.  Covid: on medications, stable, monitored.  Hypothyroidism: On Synthroid 75 mcg po daily, ?compliance, euthyroid.  HLD: on statin.        Jeanie Gao MD  Cell: [phone]  Office: [phone]               Assessment  DMT2: 87y Female with DM T2 with hyperglycemia, A1C 7%, on NPH insulin and coverage, blood sugars are fluctuating/running high today, no hypoglycemias, on peg tube feeds,  NAD, remains on prednisolone.  Covid: on medications, stable, monitored.  Hypothyroidism: On Synthroid 75 mcg po daily, ?compliance, euthyroid.  HLD: on statin.        Jeanie Gao MD  Cell: [phone]  Office: [phone]

## 2022-10-02 NOTE — PROGRESS NOTE ADULT - ASSESSMENT
89    year old female    h/o hemorrhagic CVA, has  PEG,  HTN, MI,   HLD, gout, right ca  breast   right Ca breast. s/p mastectomy in 2019,  s/p  sentinel node  localization.  4/4,  were  negative  s/p rrt  . in past,  for hypoxia. cxr with complete  collapsed  of  left lung, needing broch,   s p  bronchoscopy , pt  with  tracheomalacia  and  collapsed  airways,  makes  this a  difficult  situation, as there is no good rx for this     h/o bacteremia. on   pna, perforated  appendicitis,  ?  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 this will not alter rx. pt  redvd  extended  course  of ab   on iv  vanco and ertapenem.  till  2/9/.22.        *  admitted with   presumed  resp  failure/ pt was  sent back from [facility], the same day   pt seen in er.  appears   at her naseline.  no  wheezing /  atousable    ENT eval w/ laryngoscopy notable for vocal cords mobile and intact, no edema, upper airway patent     hypertonic saline for airway clearance   cxr, no pna     *   s/p cva, has  PEG,  npo  ,   on  synthroid, Keppra  ,  *   HTN, on  cardizem,  per  card dr jamison  *  h/o  Ca breast. /, s/p  R  mastectomy  *   obesity, bmi  was   41, now  is  30   *     c/c left  leg contracture ,  remains  bed  bound. functional  paraplegia,  needs  assistance  for  all her  adl's   *  pt  with  h/o  tracheomalacia  and  collapsed  airways, propensity  for  lung collpse,   pt is  at risk for  re admissions     on nocturnal  bipap,  difficult  situation, a s there  is no  good  rx  for  pt's  recurrent lung collapse hypoxia     h/o  of  chronic    mild  tachycardia          *   CT chest. lytic  lesions, T  spine/  ribs/ humerus/  oncoloigy  d r ohri.  suspect  metastatic ca breast        son. guicho Dan/ oncologist  has  also  discussed  with  son,  futility of  pursuing  bx. a s pt  is  not an ideal   candidate  for  chemo    CT  A.p ,/ prelim ,  pelvic  mets  per  oncology,   suspect  metastatic ca  breast,  was  awaiting   mri  spine/  pt unable  to tolerate  mri   per oncology, no  further  intervention/  and on  Arimidex, now,   per d r ohri   obesity,  bed bound.  functional paraplegia    per  son,   rehab promptly  returned   to er/ as they  did not  have  a  bipap machine /  care cortn to f/.p, needs  24/7  O2/ nocturnal bipap   difficult  situation,  bed  bound. obesity. c/c  hypercarbia, needing , prn /  nocturnal bipap/  with intermittent tachypnea    now  is  covid +. on iv steroid/ was  wheezing/ remdissivir/, seen by  house  ID   pt  with  frequent episodes  of tachypnea. has  obstructive /restrictive   lung  disease ,  obesity/ RAQUEL/ hypercarbia   called son, mlple  times,  message  left/    pt  remains  at  her  baseline ,   verbal  at  times  Afib, on xarelto  20m mg qd    s/p tachycardia, on   cardizem  and digoxin/  remains on iv steroid  per  pulm    pt  with poor  access   and  IR  note  seen, pt unable to lie  supine   pt at  baseline,  has  never  been  able  to  lie  supine,  given all  her  co  morbidities, and pt  has  been  optimized   to  fullest  degree possible  very  difficult  situation   on   prednisolone,  via  peg  pt 's  situation is tenuous   there  ie  no  room  for further    intervention. , CT  chest  prelim, atelectasis  pt  with  ongoing intermittent tachycardia/ tachypnea/   unable  to  reach  son/  s/p  rrt on 9/30  for tachypnea    hypernatremia   labs    ordered. needs  bipap       per  son, pt is  full code

## 2022-10-02 NOTE — PROGRESS NOTE ADULT - SUBJECTIVE AND OBJECTIVE BOX
Follow-up Pulm Progress Note - [facility]    Pt remains the same, head on chest, short of breath.    Medications:  MEDICATIONS  (STANDING):  albuterol/ipratropium for Nebulization 3 milliLiter(s) Nebulizer every 6 hours  allopurinol 100 milliGRAM(s) Oral daily  anastrozole 1 milliGRAM(s) Oral daily  atorvastatin 20 milliGRAM(s) Oral at bedtime  bisacodyl 5 milliGRAM(s) Oral at bedtime  buDESOnide    Inhalation Suspension 0.5 milliGRAM(s) Inhalation every 12 hours  chlorhexidine 2% Cloths 1 Application(s) Topical <User Schedule>  dextrose 5%. 1000 milliLiter(s) (50 mL/Hr) IV Continuous <Continuous>  dextrose 50% Injectable 25 Gram(s) IV Push once  dextrose 50% Injectable 12.5 Gram(s) IV Push once  dextrose 50% Injectable 25 Gram(s) IV Push once  dextrose Oral Gel 15 Gram(s) Oral once  digoxin     Tablet 125 MICROGram(s) Oral daily  diltiazem    Tablet 30 milliGRAM(s) Enteral Tube every 6 hours  glucagon  Injectable 1 milliGRAM(s) IntraMuscular once  guaiFENesin Oral Liquid (Sugar-Free) 300 milliGRAM(s) Oral every 6 hours  insulin lispro (ADMELOG) corrective regimen sliding scale   SubCutaneous every 6 hours  insulin NPH human recombinant 9 Unit(s) SubCutaneous every 6 hours  levETIRAcetam  Solution 750 milliGRAM(s) Oral two times a day  levothyroxine 75 MICROGram(s) Oral daily  melatonin 3 milliGRAM(s) Oral at bedtime  prednisoLONE    3 mG/mL Solution (ORAPRED) 45 milliGRAM(s) Oral daily  prednisoLONE    3 mG/mL Solution (ORAPRED)   Oral   rivaroxaban 20 milliGRAM(s) Oral with dinner  senna 2 Tablet(s) Oral at bedtime    MEDICATIONS  (PRN):  acetaminophen     Tablet .. 650 milliGRAM(s) Oral every 6 hours PRN Temp greater or equal to 38C (100.4F), Mild Pain (1 - 3)  ALPRAZolam 0.25 milliGRAM(s) Oral every 8 hours PRN anxiety  polyethylene glycol 3350 17 Gram(s) Oral daily PRN Constipation        Vital Signs Last 24 Hrs  T(C): 36.7 (02 Oct 2022 04:26), Max: 37 (01 Oct 2022 12:31)  T(F): 98.1 (02 Oct 2022 04:26), Max: 98.6 (01 Oct 2022 12:31)  HR: 97 (02 Oct 2022 10:41) (60 - 110)  BP: 110/62 (02 Oct 2022 04:26) (110/62 - 127/98)  BP(mean): --  RR: 20 (02 Oct 2022 04:26) (20 - 20)  SpO2: 99% (02 Oct 2022 10:41) (92% - 100%)    Parameters below as of 02 Oct 2022 04:26  Patient On (Oxygen Delivery Method): BiPAP/CPAP        ABG - ( 30 Sep 2022 14:17 )  pH, Arterial: 7.47  pH, Blood: x     /  pCO2: 35    /  pO2: 134   / HCO3: 26    / Base Excess: 1.9   /  SaO2: 99.6                  10-01 @ 07:01  -  10-02 @ 07:00  --------------------------------------------------------  IN: 980 mL / OUT: 624 mL / NET: 356 mL          LABS:    10-02    145  |  107  |  37<H>  ----------------------------<  236<H>  4.9   |  20<L>  |  0.79    Ca    8.6      02 Oct 2022 09:51          CAPILLARY BLOOD GLUCOSE      POCT Blood Glucose.: 314 mg/dL (02 Oct 2022 11:22)              CULTURES:        Physical Examination:  Awake and alert  Normocephalic atraumatic, head is bent on chest  NECK: supple, normal range of motion   PULM: Clear to auscultation bilaterally, with decreased bs throughout, no wheeze at this time  CVS: Regular rate and rhythm, no murmurs, rubs, or gallops  Abd:  soft, non tenderm peg, morbidly obese  Extrem: No CC, 1+ edema

## 2022-10-02 NOTE — PROGRESS NOTE ADULT - ASSESSMENT
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 She also has a history of HTN, HLD, CAD s/p MI with preserved LVEF and moderate aortic stenosis. The patient was admitted to [place] in December 2021 with fever and abdominal pain due to perforated appendicitis. She was treated with tube drainage and antibiotics for a polymicrobial infection. Hospital course was complicated by respiratory failure due to mucous plugging with complete collapse of the left lung. She underwent several bronchoscopies for pulmonary toilet and was found to have severe tracheobronchomalacia. Her respiratory status has remained "stable" while at [facility] on nebulized bronchodilators and hypertonic saline and without nocturnal NIV. She was admitted in March with hematochezia. The left lower lobe was well aerated on a CT scan of the abdomen and pelvis with only bibasilar subsegmental atelectasis - there was scattered sigmoid diverticulosis without evidence of diverticulitis - interval decrease in size of a fluid collection in the region of the previously perforated appendicitis measuring 2.8 x 1.5 cm previously measuring 4.0 x 2.2 cm - slight increase in mild adjacent inflammatory change. The GI bleed was treated conservatively.  She was admitted on 8/29 with shortness of breath in the setting of back, neck and left lower extremity pain. She required NIV for mild acute hypercapnic respiratory failure that quickly resolved. Her respiratory status remained stable on room air and on her usual nebs and mucolytics. She was started on a puree diet with moderately thickened fluids in addition to PEG feeds following evaluation by the speech and swallow team. She was started on anastrazole for likely metastatic breast cancer to the bone. On the day of discharge, the patient was quite anxious with shortness of breath, chest congestion and wheeze - she was making a grunting noise with expiration. She was returned from [facility] that evening. Currently she is tachypneic and using accessory muscles of respiration. She has an audible wheeze and cough productive of scant sputum. No fevers, chills or sweats. No chest pain/pressure or palpitations.     the patient has new onset bronchospasm with increased work of breathing and worsening left lower lobe atelectasis -  she has severe tracheobronchomalacia that has resulted in mucous plugging with complete left lung collapse requiring several bronchoscopies for pulmonary toilet in the past - the patient was felt not to be a candidate for surgery for the tracheobronchomalacia and stenting was felt to have more risk than benefit     metastatic breast cancer    9/19 -  found to be COVID positive on discharge [facility]; awake and alert; now with marked worsening of shortness of breath and using her accessory muscles of respiration; severe chest congestion and wheeze exacerbated by neck flexion; increasing pCO2, WBC and lactate; occasional cough productive of scant sputum; no fevers, chills or sweats; no chest pain/pressure or palpitations; back on PEG feeds; switched from nebs to inhalers yesterday as Dr. Dexter was informed by nursing that COVID positive patients cannot receive nebs at [place]    9/21 - spent the day on AVAPS -> removed this AM with somewhat less increased work of breathing and decreased chest congestion and wheeze; VBG not sent this AM; in airborne and contact isolation due to hospital acquired COVID infection; awake and alert; occasional cough productive of scant sputum; no fevers, chills or sweats; no chest pain/pressure or palpitations;     9/23 - off AVAPS this AM - laying almost flat in bed with neck and back flexed; increased work of breathing; in airborne and contact isolation due to hospital acquired COVID infection; awake and alert; begging for lemonade; occasional cough productive of scant sputum; improving chest congestion or wheeze; no fevers, chills or sweats; no chest pain/pressure or palpitations; receiving PEG feeds when off NIV;    9/26 -  again laying almost flat in bed with neck and back flexed; decreased work of breathing now on a nasal canula during the day and AVAPS at night; airborne and contact isolation being discontinued after treatment for a hospital acquired COVID infection; awake and alert asking for something to eat and drink; occasional cough productive of scant sputum; improving chest congestion and wheeze; no fevers, chills or sweats; no chest pain/pressure or palpitations; receiving PEG feeds    9/30 - hoisted up in bed earlier this AM - neck flexed onto the anterior chest wall; increased tachypnea without hypoxemia on a 3lpm nasal canula today; no cough, sputum production, hemoptysis, chest congestion or wheeze; no fevers, chills or sweats; no chest pain/pressure or palpitations.  It continues      PLAN/RECOMMENDATIONS:    the patient's head needs to be elevated greater than 45 degrees at all times, she does not do well when her head is flexed anteriorly.  She slides down the bed constantly  oxygen supplementation via a nasal canula during the day to keep saturation greater than 92% - currently on a 3lpm nasal canula -> taper as tolerated  continue nocturnal BIPAP to prevent atelectasis for now, she may very well need bipap during the day if she can tolerate especially when she gets tired or short of breath  has completed a 5 day course of remdesivir  prednisolone 45mg via PEG - taper as written - will need to adjust insulin accordingly  continue budesonide/duonebs  continue guaifenesin 300mg 4 times daily via PEG  incentive spirometry   PEG feeds - see recommendations by nutrition service  oncology follow-up noted    s/p right simple mastectomy 4/2019 for breast cancer but did not receive adjuvant hormonal therapy    it is likely that the patient has metastatic breast cancer    started on anastrozole as the patient could not tolerate a MRI and is not a candidate for bone biopsy  cardiology follow-up for management of HTN, HLD, CAD, aortic stenosis and atrial fibrillation     cardiac meds: digoxin/diltiazem/xarelto  DVT prophylaxis - xarelto  glucose control  bowel regimen  analgesics  keppra    her prognosis appears to be very poor, family is unrealistic.  Should be seen by palliative care if has not done so already.        Tami Rendon MD, [facility]  [phone]  Pulmonary Medicine

## 2022-10-03 NOTE — PROGRESS NOTE ADULT - SUBJECTIVE AND OBJECTIVE BOX
Chief complaint  Patient is a 87y old  Female who presents with a chief complaint of SOB (03 Oct 2022 08:11)   Review of systems  Patient in bed, no hypoglycemic episodes.    Labs and Fingersticks  CAPILLARY BLOOD GLUCOSE      POCT Blood Glucose.: 186 mg/dL (03 Oct 2022 12:04)  POCT Blood Glucose.: 131 mg/dL (03 Oct 2022 06:20)  POCT Blood Glucose.: 176 mg/dL (03 Oct 2022 00:27)  POCT Blood Glucose.: 310 mg/dL (02 Oct 2022 18:12)      Anion Gap, Serum: 12 (10-03 @ 11:22)  Anion Gap, Serum: 18 *H* (10-02 @ 09:51)      Calcium, Total Serum: 8.8 (10-03 @ 11:22)  Calcium, Total Serum: 8.6 (10-02 @ 09:51)          10-03    139  |  103  |  37<H>  ----------------------------<  191<H>  4.6   |  24  |  0.76    Ca    8.8      03 Oct 2022 11:22                          See note  See Note )-----------( See note    ( 03 Oct 2022 11:22 )             See note    Medications  MEDICATIONS  (STANDING):  albuterol/ipratropium for Nebulization 3 milliLiter(s) Nebulizer every 6 hours  allopurinol 100 milliGRAM(s) Oral daily  anastrozole 1 milliGRAM(s) Oral daily  atorvastatin 20 milliGRAM(s) Oral at bedtime  bisacodyl 5 milliGRAM(s) Oral at bedtime  buDESOnide    Inhalation Suspension 0.5 milliGRAM(s) Inhalation every 12 hours  chlorhexidine 2% Cloths 1 Application(s) Topical <User Schedule>  dextrose 5%. 1000 milliLiter(s) (50 mL/Hr) IV Continuous <Continuous>  dextrose 50% Injectable 25 Gram(s) IV Push once  dextrose 50% Injectable 12.5 Gram(s) IV Push once  dextrose 50% Injectable 25 Gram(s) IV Push once  dextrose Oral Gel 15 Gram(s) Oral once  digoxin     Tablet 125 MICROGram(s) Oral daily  diltiazem    Tablet 30 milliGRAM(s) Enteral Tube every 6 hours  glucagon  Injectable 1 milliGRAM(s) IntraMuscular once  guaiFENesin Oral Liquid (Sugar-Free) 300 milliGRAM(s) Oral every 6 hours  insulin lispro (ADMELOG) corrective regimen sliding scale   SubCutaneous every 6 hours  insulin NPH human recombinant 10 Unit(s) SubCutaneous every 6 hours  levETIRAcetam  Solution 750 milliGRAM(s) Oral two times a day  levothyroxine 75 MICROGram(s) Oral daily  melatonin 3 milliGRAM(s) Oral at bedtime  prednisoLONE    3 mG/mL Solution (ORAPRED) 45 milliGRAM(s) Oral daily  prednisoLONE    3 mG/mL Solution (ORAPRED)   Oral   rivaroxaban 20 milliGRAM(s) Oral with dinner  senna 2 Tablet(s) Oral at bedtime      Physical Exam  General: Patient comfortable in bed  Vital Signs Last 12 Hrs  T(F): 98.4 (10-03-22 @ 11:19), Max: 98.9 (10-03-22 @ 04:33)  HR: 89 (10-03-22 @ 11:19) (89 - 98)  BP: 102/40 (10-03-22 @ 11:19) (102/40 - 112/67)  BP(mean): --  RR: 21 (10-03-22 @ 11:19) (0 - 21)  SpO2: 92% (10-03-22 @ 11:19) (92% - 100%)  Neck: No palpable thyroid nodules.       Chief complaint  Patient is a 87y old  Female who presents with a chief complaint of SOB (03 Oct 2022 08:11)   Review of systems  Patient in bed, no hypoglycemic episodes.    Labs and Fingersticks  CAPILLARY BLOOD GLUCOSE      POCT Blood Glucose.: 186 mg/dL (03 Oct 2022 12:04)  POCT Blood Glucose.: 131 mg/dL (03 Oct 2022 06:20)  POCT Blood Glucose.: 176 mg/dL (03 Oct 2022 00:27)  POCT Blood Glucose.: 310 mg/dL (02 Oct 2022 18:12)      Anion Gap, Serum: 12 (10-03 @ 11:22)  Anion Gap, Serum: 18 *H* (10-02 @ 09:51)      Calcium, Total Serum: 8.8 (10-03 @ 11:22)  Calcium, Total Serum: 8.6 (10-02 @ 09:51)          10-03    139  |  103  |  37<H>  ----------------------------<  191<H>  4.6   |  24  |  0.76    Ca    8.8      03 Oct 2022 11:22                          See note  See Note )-----------( See note    ( 03 Oct 2022 11:22 )             See note    Medications  MEDICATIONS  (STANDING):  albuterol/ipratropium for Nebulization 3 milliLiter(s) Nebulizer every 6 hours  allopurinol 100 milliGRAM(s) Oral daily  anastrozole 1 milliGRAM(s) Oral daily  atorvastatin 20 milliGRAM(s) Oral at bedtime  bisacodyl 5 milliGRAM(s) Oral at bedtime  buDESOnide    Inhalation Suspension 0.5 milliGRAM(s) Inhalation every 12 hours  chlorhexidine 2% Cloths 1 Application(s) Topical <User Schedule>  dextrose 5%. 1000 milliLiter(s) (50 mL/Hr) IV Continuous <Continuous>  dextrose 50% Injectable 25 Gram(s) IV Push once  dextrose 50% Injectable 12.5 Gram(s) IV Push once  dextrose 50% Injectable 25 Gram(s) IV Push once  dextrose Oral Gel 15 Gram(s) Oral once  digoxin     Tablet 125 MICROGram(s) Oral daily  diltiazem    Tablet 30 milliGRAM(s) Enteral Tube every 6 hours  glucagon  Injectable 1 milliGRAM(s) IntraMuscular once  guaiFENesin Oral Liquid (Sugar-Free) 300 milliGRAM(s) Oral every 6 hours  insulin lispro (ADMELOG) corrective regimen sliding scale   SubCutaneous every 6 hours  insulin NPH human recombinant 10 Unit(s) SubCutaneous every 6 hours  levETIRAcetam  Solution 750 milliGRAM(s) Oral two times a day  levothyroxine 75 MICROGram(s) Oral daily  melatonin 3 milliGRAM(s) Oral at bedtime  prednisoLONE    3 mG/mL Solution (ORAPRED) 45 milliGRAM(s) Oral daily  prednisoLONE    3 mG/mL Solution (ORAPRED)   Oral   rivaroxaban 20 milliGRAM(s) Oral with dinner  senna 2 Tablet(s) Oral at bedtime      Physical Exam  General: Patient comfortable in bed  Vital Signs Last 12 Hrs  T(F): 98.4 (10-03-22 @ 11:19), Max: 98.9 (10-03-22 @ 04:33)  HR: 89 (10-03-22 @ 11:19) (89 - 98)  BP: 102/40 (10-03-22 @ 11:19) (102/40 - 112/67)  BP(mean): --  RR: 21 (10-03-22 @ 11:19) (0 - 21)  SpO2: 92% (10-03-22 @ 11:19) (92% - 100%)  Neck: No palpable thyroid nodules.

## 2022-10-03 NOTE — PROGRESS NOTE ADULT - ASSESSMENT
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 She also has a history of HTN, HLD, CAD s/p MI with preserved LVEF and moderate aortic stenosis. The patient was admitted to [place] in December 2021 with fever and abdominal pain due to perforated appendicitis. She was treated with tube drainage and antibiotics for a polymicrobial infection. Hospital course was complicated by respiratory failure due to mucous plugging with complete collapse of the left lung. She underwent several bronchoscopies for pulmonary toilet and was found to have severe tracheobronchomalacia. Her respiratory status has remained "stable" while at [facility] on nebulized bronchodilators and hypertonic saline and without nocturnal NIV. She was admitted in March with hematochezia. The left lower lobe was well aerated on a CT scan of the abdomen and pelvis with only bibasilar subsegmental atelectasis - there was scattered sigmoid diverticulosis without evidence of diverticulitis - interval decrease in size of a fluid collection in the region of the previously perforated appendicitis measuring 2.8 x 1.5 cm previously measuring 4.0 x 2.2 cm - slight increase in mild adjacent inflammatory change. The GI bleed was treated conservatively.  She was admitted on 8/29 with shortness of breath in the setting of back, neck and left lower extremity pain. She required NIV for mild acute hypercapnic respiratory failure that quickly resolved. Her respiratory status remained stable on room air and on her usual nebs and mucolytics. She was started on a puree diet with moderately thickened fluids in addition to PEG feeds following evaluation by the speech and swallow team. She was started on anastrazole for likely metastatic breast cancer to the bone. On the day of discharge, the patient was quite anxious with shortness of breath, chest congestion and wheeze - she was making a grunting noise with expiration. She was returned from [facility] that evening. Currently she is tachypneic and using accessory muscles of respiration. She has an audible wheeze and cough productive of scant sputum. No fevers, chills or sweats. No chest pain/pressure or palpitations.     the patient has new onset bronchospasm with increased work of breathing and worsening left lower lobe atelectasis -  she has severe tracheobronchomalacia that has resulted in mucous plugging with complete left lung collapse requiring several bronchoscopies for pulmonary toilet in the past - the patient was felt not to be a candidate for surgery for the tracheobronchomalacia and stenting was felt to have more risk than benefit     metastatic breast cancer    9/19 -  found to be COVID positive on discharge [facility]; awake and alert; now with marked worsening of shortness of breath and using her accessory muscles of respiration; severe chest congestion and wheeze exacerbated by neck flexion; increasing pCO2, WBC and lactate; occasional cough productive of scant sputum; no fevers, chills or sweats; no chest pain/pressure or palpitations; back on PEG feeds; switched from nebs to inhalers yesterday as Dr. Dexter was informed by nursing that COVID positive patients cannot receive nebs at [place]    9/21 - spent the day on AVAPS -> removed this AM with somewhat less increased work of breathing and decreased chest congestion and wheeze; VBG not sent this AM; in airborne and contact isolation due to hospital acquired COVID infection; awake and alert; occasional cough productive of scant sputum; no fevers, chills or sweats; no chest pain/pressure or palpitations;     9/23 - off AVAPS this AM - laying almost flat in bed with neck and back flexed; increased work of breathing; in airborne and contact isolation due to hospital acquired COVID infection; awake and alert; begging for lemonade; occasional cough productive of scant sputum; improving chest congestion or wheeze; no fevers, chills or sweats; no chest pain/pressure or palpitations; receiving PEG feeds when off NIV;    9/26 -  again laying almost flat in bed with neck and back flexed; decreased work of breathing now on a nasal canula during the day and AVAPS at night; airborne and contact isolation being discontinued after treatment for a hospital acquired COVID infection; awake and alert asking for something to eat and drink; occasional cough productive of scant sputum; improving chest congestion and wheeze; no fevers, chills or sweats; no chest pain/pressure or palpitations; receiving PEG feeds    9/30 - hoisted up in bed earlier this AM - neck flexed onto the anterior chest wall; increased tachypnea without hypoxemia on a 3lpm nasal canula today; no cough, sputum production, hemoptysis, chest congestion or wheeze; no fevers, chills or sweats; no chest pain/pressure or palpitations; tolerating ICE chips.       PLAN/RECOMMENDATIONS:    the patient's head needs to be elevated greater than 45 degrees at all times - unfortunately her neck is flexed downward and rests on her anterior chest obstructing her upper airway - she continues to slide down in bed limiting her diaphragmatic excursion  repeat chest CT -> patent central airways - stable subsegmental atelectasis within posterior upper lobes, lingula and basilar lower lobes - trace fluid within the left fissure - no significant pleural effusion or pneumothorax - new T8 compression deformity - redemonstration of T2 and T3 compression fractures - unchanged mild T11 wedge deformity - redemonstration of multiple lytic lesions in the thoracic spine - fixation plate and screws within the left proximal humerus - status post right-sided mastectomy  oxygen supplementation via a nasal canula during the day to keep saturation greater than 92% - currently on a 3lpm nasal canula   continue nocturnal BIPAP to prevent atelectasis for now  daytime BIPAP as needed for increased work of breathing, resistant hypoxemia or respiratory acidosis  VBG 9/27 -> 7.42/45/61/90.4% - resolved respiratory acidosis  has completed a 5 day course of remdesivir  prednisolone 45mg via PEG - taper as written - will need to adjust insulin accordingly  continue albuterol/atrovent nebs q6h  continue pulmicort 0.5mg nebs q12h  continue guaifenesin 300mg 4 times daily via PEG  incentive spirometry   PEG feeds - see recommendations by nutrition service  CT scan 8/31 -> multiple lytic lesions are seen throughout the axial and appendicular skeleton, some of which appear increased in size from CT abdomen pelvis 3/16/2022 when evaluated in retrospect - a few lytic lesions involve the left intertrochanteric region and right acetabulum  oncology follow-up noted    s/p right simple mastectomy 4/2019 for breast cancer but did not receive adjuvant hormonal therapy    it is likely that the patient has metastatic breast cancer    started on anastrozole as the patient could not tolerate a MRI and is not a candidate for bone biopsy  cardiology follow-up for management of HTN, HLD, CAD, aortic stenosis and atrial fibrillation     cardiac meds: digoxin/diltiazem/xarelto  DVT prophylaxis - xarelto  glucose control  bowel regimen  analgesics  keppra      Will follow with you. Plan of care discussed with the patient at bedside and the dedicated floor NP. The patient's respiratory status remains tenuous and she will unlikely be able to be discharged back to [facility] - her children are very unrealistic - palliative care evaluation    Kennedy Marcano MD, [facility]  [phone]  Pulmonary Medicine

## 2022-10-03 NOTE — PROGRESS NOTE ADULT - SUBJECTIVE AND OBJECTIVE BOX
CARDIOLOGY     PROGRESS  NOTE   ________________________________________________    CHIEF COMPLAINT:Patient is a 87y old  Female who presents with a chief complaint of SOB (02 Oct 2022 15:49)  no complain.  	  REVIEW OF SYSTEMS:  CONSTITUTIONAL: No fever, weight loss, or fatigue  EYES: No eye pain, visual disturbances, or discharge  ENT:  No difficulty hearing, tinnitus, vertigo; No sinus or throat pain  NECK: No pain or stiffness  RESPIRATORY: No cough, wheezing, chills or hemoptysis; + Shortness of Breath  CARDIOVASCULAR: No chest pain, palpitations, passing out, dizziness, or leg swelling  GASTROINTESTINAL: No abdominal or epigastric pain. No nausea, vomiting, or hematemesis; No diarrhea or constipation. No melena or hematochezia.  GENITOURINARY: No dysuria, frequency, hematuria, or incontinence  NEUROLOGICAL: No headaches, memory loss, loss of strength, numbness, or tremors  SKIN: No itching, burning, rashes, or lesions   LYMPH Nodes: No enlarged glands  ENDOCRINE: No heat or cold intolerance; No hair loss  MUSCULOSKELETAL: No joint pain or swelling; No muscle, back, or extremity pain  PSYCHIATRIC: No depression, anxiety, mood swings, or difficulty sleeping  HEME/LYMPH: No easy bruising, or bleeding gums  ALLERGY AND IMMUNOLOGIC: No hives or eczema	    [ ] All others negative	  [ ] Unable to obtain    PHYSICAL EXAM:  T(C): 37.2 (10-03-22 @ 04:33), Max: 37.2 (10-03-22 @ 04:33)  HR: 97 (10-03-22 @ 06:16) (88 - 99)  BP: 112/67 (10-03-22 @ 04:33) (100/49 - 116/87)  RR: 20 (10-03-22 @ 04:33) (20 - 20)  SpO2: 99% (10-03-22 @ 06:16) (91% - 100%)  Wt(kg): --  I&O's Summary    01 Oct 2022 07:01  -  02 Oct 2022 07:00  --------------------------------------------------------  IN: 980 mL / OUT: 624 mL / NET: 356 mL    02 Oct 2022 07:01  -  03 Oct 2022 06:58  --------------------------------------------------------  IN: 0 mL / OUT: 625 mL / NET: -625 mL        Appearance: Normal	  HEENT:   Normal oral mucosa, PERRL, EOMI	  Lymphatic: No lymphadenopathy  Cardiovascular: Normal S1 S2, No JVD, + murmurs, No edema  Respiratory: rhonchi  Gastrointestinal:  Soft, Non-tender, + BS	  Skin: No rashes, No ecchymoses, No cyanosis	  Extremities: Normal range of motion, No clubbing, cyanosis or edema  Vascular: Peripheral pulses palpable 2+ bilaterally    MEDICATIONS  (STANDING):  albuterol/ipratropium for Nebulization 3 milliLiter(s) Nebulizer every 6 hours  allopurinol 100 milliGRAM(s) Oral daily  anastrozole 1 milliGRAM(s) Oral daily  atorvastatin 20 milliGRAM(s) Oral at bedtime  bisacodyl 5 milliGRAM(s) Oral at bedtime  buDESOnide    Inhalation Suspension 0.5 milliGRAM(s) Inhalation every 12 hours  chlorhexidine 2% Cloths 1 Application(s) Topical <User Schedule>  dextrose 5%. 1000 milliLiter(s) (50 mL/Hr) IV Continuous <Continuous>  dextrose 50% Injectable 25 Gram(s) IV Push once  dextrose 50% Injectable 12.5 Gram(s) IV Push once  dextrose 50% Injectable 25 Gram(s) IV Push once  dextrose Oral Gel 15 Gram(s) Oral once  digoxin     Tablet 125 MICROGram(s) Oral daily  diltiazem    Tablet 30 milliGRAM(s) Enteral Tube every 6 hours  glucagon  Injectable 1 milliGRAM(s) IntraMuscular once  guaiFENesin Oral Liquid (Sugar-Free) 300 milliGRAM(s) Oral every 6 hours  insulin lispro (ADMELOG) corrective regimen sliding scale   SubCutaneous every 6 hours  insulin NPH human recombinant 10 Unit(s) SubCutaneous every 6 hours  levETIRAcetam  Solution 750 milliGRAM(s) Oral two times a day  levothyroxine 75 MICROGram(s) Oral daily  melatonin 3 milliGRAM(s) Oral at bedtime  prednisoLONE    3 mG/mL Solution (ORAPRED) 45 milliGRAM(s) Oral daily  prednisoLONE    3 mG/mL Solution (ORAPRED)   Oral   rivaroxaban 20 milliGRAM(s) Oral with dinner  senna 2 Tablet(s) Oral at bedtime      TELEMETRY: 	    ECG:  	  RADIOLOGY:  OTHER: 	  	  LABS:	 	    CARDIAC MARKERS:            10-02    145  |  107  |  37<H>  ----------------------------<  236<H>  4.9   |  20<L>  |  0.79    Ca    8.6      02 Oct 2022 09:51      proBNP: Serum Pro-Brain Natriuretic Peptide: 461 pg/mL (09-08 @ 20:56)    Lipid Profile:   HgA1c:   TSH: Thyroid Stimulating Hormone, Serum: 0.32 uIU/mL (09-24 @ 11:34)  Thyroid Stimulating Hormone, Serum: 0.30 uIU/mL (09-24 @ 11:34)          Assessment and plan  ---------------------------  86F w/ extensive hx including severe tracheobronchomalacia (c/b hypoxia 2/2 mucous plugging and recurrent L lung collapse), 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 Very limited hx obtained from pt given mental status, dyspnea, and use of BIPAP. Unable to reach sonSy. History obtained from staff/chart review  Of note, pt has multiple recent admissions with similar presentation. Most recently was hospitalized from 8/28/22-9/8/22 for SOB, treated with airway clearance and completed 5-day course of Zosyn for empiric coverage of PNA (though per Pulm, unlikely to have PNA). Course c/b PEG displacement and subsequent reinsertion by IR on 8/30. In addition to her usual PEG feeds, pt was also started on puree diet with moderately thickened fluids for pleasure feeds. Also was started on anastrozole for most likely dx of metastatic breast cancer to bones (pt was unable to tolerate further cancer workup of spine lesions). Pt remained full code during recent admissions.  pt with metastatic ca to the bone with sob sec to obesity and underlying lung and cardiac disease  increase sob sec to missing BiPAP at night  may consider hospice care/ palliative care  pt with bone mets, no further nieto/ onc noted  on steroid as per pulmonary  a.fib on 09/23/increase Cardizem dose, will add digoxin  may increase Xarelto dose sec to a.fib  add digoxin/converted to NSR  change dig to po  remained in nsr, dc tele pt non compliant   will increase Cardizem dose as tolerated  palliative care  pulmonary fu, sterid taper

## 2022-10-03 NOTE — PROGRESS NOTE ADULT - ASSESSMENT
Assessment  DMT2: 87y Female with DM T2 with hyperglycemia, A1C 7%, on NPH insulin and coverage, increased NPH dose yesterday, blood sugars are improving and in acceptable range today, no hypoglycemias, on peg tube feeds, tachypnea requiring NIV, remains on prednisolone.  Covid: on medications, stable, monitored.  Hypothyroidism: On Synthroid 75 mcg po daily, ?compliance, euthyroid.  HLD: on statin.        Jeanie Gao MD  Cell: [phone]  Office: [phone]               Assessment  DMT2: 87y Female with DM T2 with hyperglycemia, A1C 7%, on NPH insulin and coverage, increased NPH dose yesterday, blood sugars are improving and in acceptable range today, no hypoglycemias, on peg tube feeds,  tachypnea requiring NIV, remains on prednisolone.  Covid: on medications, stable, monitored.  Hypothyroidism: On Synthroid 75 mcg po daily, ?compliance, euthyroid.  HLD: on statin.        Jeanie Gao MD  Cell: [phone]  Office: [phone]

## 2022-10-03 NOTE — PROGRESS NOTE ADULT - SUBJECTIVE AND OBJECTIVE BOX
afberile    REVIEW OF SYSTEMS:  GEN: no fever,    no chills  RESP: no SOB,   no cough  CVS: no chest pain,   no palpitations  GI: no abdominal pain,   no nausea,   no vomiting,   no constipation,   no diarrhea  : no dysuria,   no frequency  NEURO: no headache,   no dizziness  PSYCH: no depression,   not anxious  Derm : no rash    MEDICATIONS  (STANDING):  albuterol/ipratropium for Nebulization 3 milliLiter(s) Nebulizer every 6 hours  allopurinol 100 milliGRAM(s) Oral daily  anastrozole 1 milliGRAM(s) Oral daily  atorvastatin 20 milliGRAM(s) Oral at bedtime  bisacodyl 5 milliGRAM(s) Oral at bedtime  buDESOnide    Inhalation Suspension 0.5 milliGRAM(s) Inhalation every 12 hours  chlorhexidine 2% Cloths 1 Application(s) Topical <User Schedule>  dextrose 5%. 1000 milliLiter(s) (50 mL/Hr) IV Continuous <Continuous>  dextrose 50% Injectable 25 Gram(s) IV Push once  dextrose 50% Injectable 12.5 Gram(s) IV Push once  dextrose 50% Injectable 25 Gram(s) IV Push once  dextrose Oral Gel 15 Gram(s) Oral once  digoxin     Tablet 125 MICROGram(s) Oral daily  diltiazem    Tablet 30 milliGRAM(s) Enteral Tube every 6 hours  glucagon  Injectable 1 milliGRAM(s) IntraMuscular once  guaiFENesin Oral Liquid (Sugar-Free) 300 milliGRAM(s) Oral every 6 hours  insulin lispro (ADMELOG) corrective regimen sliding scale   SubCutaneous every 6 hours  insulin NPH human recombinant 10 Unit(s) SubCutaneous every 6 hours  levETIRAcetam  Solution 750 milliGRAM(s) Oral two times a day  levothyroxine 75 MICROGram(s) Oral daily  melatonin 3 milliGRAM(s) Oral at bedtime  prednisoLONE    3 mG/mL Solution (ORAPRED) 45 milliGRAM(s) Oral daily  prednisoLONE    3 mG/mL Solution (ORAPRED)   Oral   rivaroxaban 20 milliGRAM(s) Oral with dinner  senna 2 Tablet(s) Oral at bedtime    MEDICATIONS  (PRN):  acetaminophen     Tablet .. 650 milliGRAM(s) Oral every 6 hours PRN Temp greater or equal to 38C (100.4F), Mild Pain (1 - 3)  polyethylene glycol 3350 17 Gram(s) Oral daily PRN Constipation      Vital Signs Last 24 Hrs  T(C): 37.2 (03 Oct 2022 04:33), Max: 37.2 (03 Oct 2022 04:33)  T(F): 98.9 (03 Oct 2022 04:33), Max: 98.9 (03 Oct 2022 04:33)  HR: 97 (03 Oct 2022 06:16) (88 - 99)  BP: 112/67 (03 Oct 2022 04:33) (100/49 - 116/87)  BP(mean): --  RR: 20 (03 Oct 2022 04:33) (20 - 20)  SpO2: 99% (03 Oct 2022 06:16) (91% - 100%)    Parameters below as of 03 Oct 2022 04:33  Patient On (Oxygen Delivery Method): nasal cannula      CAPILLARY BLOOD GLUCOSE      POCT Blood Glucose.: 131 mg/dL (03 Oct 2022 06:20)  POCT Blood Glucose.: 176 mg/dL (03 Oct 2022 00:27)  POCT Blood Glucose.: 310 mg/dL (02 Oct 2022 18:12)  POCT Blood Glucose.: 314 mg/dL (02 Oct 2022 11:22)    I&O's Summary    02 Oct 2022 07:01  -  03 Oct 2022 07:00  --------------------------------------------------------  IN: 0 mL / OUT: 625 mL / NET: -625 mL        PHYSICAL EXAM:  HEAD:  Atraumatic, Normocephalic  NECK: Supple, No   JVD  CHEST/LUNG:   no     rales,     no,    rhonchi  HEART: Regular rate and rhythm;         murmur  ABDOMEN: Soft, Nontender, ;   EXTREMITIES:     no   edema  NEUROLOGY:  alert    LABS:    10-02    145  |  107  |  37<H>  ----------------------------<  236<H>  4.9   |  20<L>  |  0.79    Ca    8.6      02 Oct 2022 09:51                      Thyroid Stimulating Hormone, Serum: 0.32 uIU/mL (09-24 @ 11:34)          Consultant(s) Notes Reviewed:      Care Discussed with Consultants/Other Providers:

## 2022-10-03 NOTE — PROGRESS NOTE ADULT - SUBJECTIVE AND OBJECTIVE BOX
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     REVIEW OF SYSTEMS:  Constitutional: As per interval history  HEENT: Within normal limits  CV: As per interval history  Resp: As per interval history  GI: dysphagia  : Within normal limits  Musculoskeletal: Within normal limits  Skin: Within normal limits  Neurological: CVA - left sided plegia  Psychiatric: Within normal limits  Endocrine: Within normal limits  Hematologic/Lymphatic: Within normal limits  Allergic/Immunologic: Within normal limits    MEDICATIONS:     Pulmonary "  albuterol/ipratropium for Nebulization 3 milliLiter(s) Nebulizer every 6 hours  buDESOnide    Inhalation Suspension 0.5 milliGRAM(s) Inhalation every 12 hours  guaiFENesin Oral Liquid (Sugar-Free) 300 milliGRAM(s) Oral every 6 hours    Anti-microbials:    Cardiovascular:  digoxin     Tablet 125 MICROGram(s) Oral daily  diltiazem    Tablet 30 milliGRAM(s) Enteral Tube every 6 hours    Other:  allopurinol 100 milliGRAM(s) Oral daily  anastrozole 1 milliGRAM(s) Oral daily  atorvastatin 20 milliGRAM(s) Oral at bedtime  bisacodyl 5 milliGRAM(s) Oral at bedtime  chlorhexidine 2% Cloths 1 Application(s) Topical <User Schedule>  dextrose 5%. 1000 milliLiter(s) IV Continuous <Continuous>  dextrose 50% Injectable 25 Gram(s) IV Push once  dextrose 50% Injectable 12.5 Gram(s) IV Push once  dextrose 50% Injectable 25 Gram(s) IV Push once  dextrose Oral Gel 15 Gram(s) Oral once  glucagon  Injectable 1 milliGRAM(s) IntraMuscular once  insulin lispro (ADMELOG) corrective regimen sliding scale   SubCutaneous every 6 hours  insulin NPH human recombinant 10 Unit(s) SubCutaneous every 6 hours  levETIRAcetam  Solution 750 milliGRAM(s) Oral two times a day  levothyroxine 75 MICROGram(s) Oral daily  melatonin 3 milliGRAM(s) Oral at bedtime  prednisoLONE    3 mG/mL Solution (ORAPRED) 45 milliGRAM(s) Oral daily  prednisoLONE    3 mG/mL Solution (ORAPRED)   Oral   rivaroxaban 20 milliGRAM(s) Oral with dinner  senna 2 Tablet(s) Oral at bedtime    MEDICATIONS  (PRN):  acetaminophen     Tablet .. 650 milliGRAM(s) Oral every 6 hours PRN Temp greater or equal to 38C (100.4F), Mild Pain (1 - 3)  polyethylene glycol 3350 17 Gram(s) Oral daily PRN Constipation        OBJECTIVE:    POCT Blood Glucose.: 131 mg/dL (03 Oct 2022 06:20)  POCT Blood Glucose.: 176 mg/dL (03 Oct 2022 00:27)  POCT Blood Glucose.: 310 mg/dL (02 Oct 2022 18:12)  POCT Blood Glucose.: 314 mg/dL (02 Oct 2022 11:22)      PHYSICAL EXAM:       ICU Vital Signs Last 24 Hrs  T(C): 37.2 (03 Oct 2022 04:33), Max: 37.2 (03 Oct 2022 04:33)  T(F): 98.9 (03 Oct 2022 04:33), Max: 98.9 (03 Oct 2022 04:33)  HR: 97 (03 Oct 2022 06:16) (88 - 99)  BP: 112/67 (03 Oct 2022 04:33) (100/49 - 116/87)  BP(mean): --  ABP: --  ABP(mean): --  RR: 20 (03 Oct 2022 04:33) (20 - 20)  SpO2: 99% (03 Oct 2022 06:16) (91% - 100%) on 3lpm nasal canula    General: Awake. Alert. Cooperative. Rapid and shallow breathing. Appears stated age. Obese. Laying flat in bed.  HEENT: Atraumatic. Normocephalic. Anicteric. Normal oral mucosa. PERRL. EOMI. Mallampati class IV airway. Poor dentition.  Neck: Supple. Trachea midline. Thyroid without enlargement/tenderness/nodules. No carotid bruit. No JVD. Short and wide. Neck flexed with chin resting on her anterior chest.  Cardiovascular: Regular rate and rhythm. S1 S2 normal. III/VI systolic murmur  Respiratory: Using accessory muscles of respiration. Clear anteriorly. Kyphosis. Poor chest wall excursion  Abdomen: Soft. Non-tender. Non-distended. No organomegaly. No masses. Normal bowel sounds. Obese. PEG.  Extremities: Warm to touch. No clubbing or cyanosis. No pedal edema. Large legs. Left leg contracted under the right leg  Pulses: 2+ peripheral pulses all extremities.	  Skin: Normal skin color. No rashes or lesions. No ecchymoses. No cyanosis. Warm to touch.  Lymph Nodes: Cervical, supraclavicular and axillary nodes normal  Neurological: Left lower extremity plegia with contraction. Left upper extremity is quite weak. A and O x 3    LABS:      CBC    WBC  QNS <==, 12.76 <==    Hemoglobin  See note <<==, 9.3 <<==    Hematocrit  See note <==, 32.5 <==    Platelets  QNS <==, 179 <==      145  |  107  |  37<H>  ----------------------------<  236<H>    10-02  4.9   |  20<L>  |  0.79      LYTES    sodium  145 <==, 151 <==    potassium   4.9 <==, 4.3 <==    chloride  107 <==, 114 <==    carbon dioxide  20 <==, 20 <==    =============================================================================================  RENAL FUNCTION:    Creatinine:   0.79  <<==, 0.63  <<==    BUN:   37 <==, 35 <==    ============================================================================================    calcium   8.6 <==, 8.7 <==  ============================================================================================  LFTs    AST:   24 <==     ALT:  20  <==     AP:  201  <=    Bili:  0.5  <=    PT/INR - ( 24 Sep 2022 11:34 )   PT: 11.3 sec;   INR: 0.98 ratio      Venous Blood Gas:  09-27 @ 11:15  7.42/45/61/29/90.4  VBG Lactate: 2.3    ABG - ( 21 Sep 2022 14:19 )  pH: 7.51  /  pCO2: 33    /  pO2: 158   / HCO3: 26    / Base Excess: 3.6   /  SaO2: 100.0     Venous Blood Gas:  09-19 @ 09:30  7.34/53/50/29/80.0  VBG Lactate: 3.5    Venous Blood Gas:  09-18 @ 03:50  7.39/48/47/29/78.2  VBG Lactate: 1.7    Procalcitonin, Serum: 0.23 ng/mL (09-18 @ 09:59)    D-Dimer Assay, Quantitative (09.18.22 @ 09:59)   D-Dimer Assay, Quantitative: 196:    C-Reactive Protein, Serum (09.18.22 @ 09:59)   C-Reactive Protein, Serum: 5 mg/L     Ferritin, Serum in AM (09.18.22 @ 09:59)   Ferritin, Serum: 63 ng/mL     < from: TTE with Doppler (w/Cont) (01.21.22 @ 14:08) >    Patient name: FRANKI MEHTA  YOB: 1935   Age: 86 (F)   MR#: [id number]  Study Date: 1/21/2022  ------------------------------------------------------------------------  Dimensions:    Normal Values:  LA:     2.5    2.0 - 4.0 cm  Ao:     3.3    2.0 - 3.8 cm  SEPTUM: 1.6    0.6 - 1.2 cm  PWT:    0.9    0.6 - 1.1 cm  LVIDd:  3.6    3.0 - 5.6 cm  LVIDs:  2.5    1.8 - 4.0 cm  Derived variables:  LVMI: 77 g/m2  RWT: 0.50  Fractional short: 31 %  EF (Visual Estimate): 70-75 %  ------------------------------------------------------------------------  Conclusions:  1. Concentric left ventricular hypertrophy.  2. Hyperdynamic left ventricular systolic function.  Endocardial visualization enhanced with intravenous  injection of Ultrasonic Enhancing Agent (Definity).  3. The right ventricle is not well visualized; grossly  normal right ventricular systolic function.  Pt refused to proceed with exam.  Limited Doppler study.  No trans aortic gradients.  Unable to rule out endocarditis.  ------------------------------------------------------------------------  Confirmed on 1/21/2022 - 15:42:57 by COMPA Castillo  ------------------------------------------------------------------------  ---------------------------------------------------------------------------------------------------------------    MICROBIOLOGY:     COVID-19 PCR . (09.19.22 @ 18:31)   COVID-19 PCR: Detected:    Respiratory Viral Panel with COVID-19 by RYAN (09.08.22 @ 21:38)   Rapid RVP Result: [facility]   SARS-CoV-2: [facility]  This Respiratory Panel uses polymerase chain reaction (PCR) to detect for   adenovirus; coronavirus (HKU1, NL63, 229E, OC43); human metapneumovirus   (hMPV); human enterovirus/rhinovirus (Entero/RV); influenza A; influenza   A/H1; influenza A/H3; influenza A/H1-2009; influenza B; parainfluenza   viruses 1, 2, 3, 4; respiratory syncytial virus; Mycoplasma pneumoniae;   Chlamydophila pneumoniae; and SARS-CoV-2.     Culture - Blood (09.08.22 @ 20:39)   Specimen Source: .Blood Blood-Peripheral   Culture Results:   No growth to date.     Culture - Blood (09.08.22 @ 20:25)   Specimen Source: .Blood Blood-Peripheral   Culture Results:   No growth to date.     RADIOLOGY:  [x ] Chest radiographs reviewed and interpreted by me     EXAM:  CT CHEST                          PROCEDURE DATE:  09/28/2022      FINDINGS:    AIRWAYS, LUNGS and PLEURA: Patent central airways. Stable subsegmental   atelectasis within posterior upper lobes, lingula and basilar lower   lobes. Trace fluid within the left fissure. Otherwise, no significant   pleural effusion or pneumothorax.    MEDIASTINUM AND GIOVANA: No lymphadenopathy.    HEART AND VESSELS: Heart size is normal. No pericardial effusion.   Thoracic aorta and pulmonary artery normal in diameter. Calcifications of   the coronary arteries and aorta.    VISUALIZED UPPER ABDOMEN: Within normal limits.    CHEST WALL AND BONES: New T8 compression deformity. Redemonstration of T2   and T3 compression fractures. Unchanged mild T11 wedge deformity.   Redemonstration of multiple lytic lesions in the thoracic spine,   unchanged from 9/14/2022. Fixation plate and screws within the left   proximal humerus. Status post right-sided mastectomy.    LOWER NECK: Within normal limits.    MISCELLANEOUS: Near-complete opacification of the right maxillary sinus.   Mucosal thickening of the left maxillary sinus.    IMPRESSION:    The airways are patent without mucoid impaction.    Mild bilateral areas of subsegmental atelectasis. The lungs are otherwise   clear.    Diffuse lytic bone lesions are again noted. Mild-moderate loss of body   height of T8 vertebral body is new from prior exam.    AED CAMACHO MD; Resident Radiologist  This document has been electronically signed.  GIANNI MANTILLA MD; Attending Radiologist  This document has been electronically signed. Sep 29 2022 10:50AM  ---------------------------------------------------------------------------------------------------------------    EXAM:  XR CHEST PORTABLE URGENT 1V                          PROCEDURE DATE:  09/19/2022      FINDINGS:  Radiographic interpretation is limited by patient positioning and   overlying artifact.  Gastrostomy tube is noted.  Left humeral fixation plate and screws are noted.  The heart size cannot be accurately assessed in this projection.  Left basilar hazy opacities  Low lung volumes bilaterally.  There is no pneumothorax or large pleural effusion.  No acute bony abnormality.    IMPRESSION:  Left basilar hazy opacity may represent atelectasis.    AMY JARAMILLO MD; Resident Radiologist  This document has been electronically signed.  DIANE CLARK MD; Attending Radiologist  This document has been electronically signed. Sep 19 2022  6:08PM  --------------------------------------------------------------------------------------------------------------  EXAM:  CT CHEST                          PROCEDURE DATE:  09/14/2022      FINDINGS:    LUNGS AND AIRWAYS: Patent central airways.  Subsegmental atelectasis is   seen in the bilateral upper and lower lobes.  PLEURA: No pleural effusion.  MEDIASTINUM AND GIOVANA: No lymphadenopathy.  VESSELS: Calcifications of the aorta, aortic valve and coronary arteries.  HEART: Heart size is normal. No pericardial effusion.  CHEST WALL AND LOWER NECK: Within normal limits.  VISUALIZED UPPER ABDOMEN: Cholelithiasis.  BONES: Multiple lytic bone lesions are again seen including a destructive   lesion at the level of the T5 vertebral body that may extend into the   left neural foramen (3:42), unchanged from prior CT chest.    IMPRESSION:  Subsegmental atelectasis in the bilateral upper and lower lobes.    Lytic bone lesions as described above, unchanged from prior CT chest   8/29/2022. Recommend MR thoracic spine for further evaluation.     ALEXANDRIA COLLINS MD; Resident Radiologist  This document has been electronically signed.  HAY IRVIN MD; Attending Radiologist  This document has been electronically signed. Sep 14 2022  3:23PM  ---------------------------------------------------------------------------------------------------------------  EXAM:  DUPLEX SCAN EXT VEINS LOWER BI                          PROCEDURE DATE:  09/10/2022      IMPRESSION:  Limited study  No evidence of deep venous thrombosis in either lower extremity.  Limited visualization of the calf veins.    KEYLA ROMERO MD; Attending Radiologist  This document has been electronically signed. Sep 10 2022 10:42AM  ---------------------------------------------------------------------------------------------------------------  EXAM:  CT ABDOMEN AND PELVIS IC                          PROCEDURE DATE:  08/31/2022      IMPRESSION:    Multiple lytic lesions are seen throughout the axial and appendicular   skeleton, some of which appear increased in size from CT abdomen pelvis   3/16/2022 when evaluated in retrospect. A few lytic lesions involve the   left intertrochanteric region and right acetabulum.    Endometrial thickening. Recommend dedicated pelvic sonogram.    Cystic lesions are seen within the pancreas. Recommend a dedicated MRI on   a nonemergent basis.    SULY KENDRICK MD; Resident Radiology  This document has been electronically signed.  BRITTANI MALCOLM MD; Attending Radiologist  This document has been electronically signed. Sep  1 2022 11:01AM  ---------------------------------------------------------------------------------------------------------------  EXAM:  XR HUMERUS MIN 2 VIEWS RT                          PROCEDURE DATE:  08/30/2022      EXAM:  Internal/external rotation AP right humerus from 8/30/2022 at 0927.   Compared to appearance on previous day chest CT.    IMPRESSION:  Generalized osteopenia. Redemonstrated focal lytic lesion in proximal   medial humeral diaphysis with small area of permeative cortical   destruction. No additional radiographically appreciated suspicious lytic   or blastic lesions in remaining imaged regions.    No current fractures or dislocations.    Preserved shoulder and elbow joint spaces.    IV cannula with attached tubing overlies upper arm.    MINE NICE MD; Attending Radiologist  This document has been electronically signed. Aug 30 2022 10:39AM  ---------------------------------------------------------------------------------------------------------------  EXAM:  DUPLEX EXT VEINS UPPER LT                          PROCEDURE DATE:  09/01/2022      IMPRESSION:  No evidence of left upper extremity deep venous thrombosis.    FAWN FITZGERALD MD; Attending Radiologist  This document has been electronically signed. Sep  1 2022  1:10PM  ---------------------------------------------------------------------------------------------------------------  EXAM:  DUPLEX SCAN EXT VEINS LOWER BI                          PROCEDURE DATE:  08/31/2022      IMPRESSION:  No evidence of deep venous thrombosis in either lower extremity.    KEYLA ROMERO MD; Attending Radiologist  This document has been electronically signed. Aug 31 2022  7:59PM  ------------------------------------------------------

## 2022-10-04 NOTE — PROGRESS NOTE ADULT - SUBJECTIVE AND OBJECTIVE BOX
afbrile    REVIEW OF SYSTEMS:  GEN: no fever,    no chills  RESP: no SOB,   no cough  CVS: no chest pain,   no palpitations  GI: no abdominal pain,   no nausea,   no vomiting,   no constipation,   no diarrhea  : no dysuria,   no frequency  NEURO: no headache,   no dizziness  PSYCH: no depression,   not anxious  Derm : no rash    MEDICATIONS  (STANDING):  albuterol/ipratropium for Nebulization 3 milliLiter(s) Nebulizer every 6 hours  allopurinol 100 milliGRAM(s) Oral daily  anastrozole 1 milliGRAM(s) Oral daily  atorvastatin 20 milliGRAM(s) Oral at bedtime  bisacodyl 5 milliGRAM(s) Oral at bedtime  buDESOnide    Inhalation Suspension 0.5 milliGRAM(s) Inhalation every 12 hours  chlorhexidine 2% Cloths 1 Application(s) Topical <User Schedule>  dextrose 5%. 1000 milliLiter(s) (50 mL/Hr) IV Continuous <Continuous>  dextrose 50% Injectable 25 Gram(s) IV Push once  dextrose 50% Injectable 12.5 Gram(s) IV Push once  dextrose 50% Injectable 25 Gram(s) IV Push once  dextrose 50% Injectable 25 Gram(s) IV Push once  dextrose Oral Gel 15 Gram(s) Oral once  digoxin     Tablet 125 MICROGram(s) Oral daily  diltiazem    Tablet 30 milliGRAM(s) Enteral Tube every 6 hours  glucagon  Injectable 1 milliGRAM(s) IntraMuscular once  guaiFENesin Oral Liquid (Sugar-Free) 300 milliGRAM(s) Oral every 6 hours  insulin lispro (ADMELOG) corrective regimen sliding scale   SubCutaneous every 6 hours  insulin NPH human recombinant 10 Unit(s) SubCutaneous every 6 hours  levETIRAcetam  Solution 750 milliGRAM(s) Oral two times a day  levothyroxine 75 MICROGram(s) Oral daily  melatonin 3 milliGRAM(s) Oral at bedtime  prednisoLONE    3 mG/mL Solution (ORAPRED) 45 milliGRAM(s) Oral daily  prednisoLONE    3 mG/mL Solution (ORAPRED)   Oral   rivaroxaban 20 milliGRAM(s) Oral with dinner  senna 2 Tablet(s) Oral at bedtime    MEDICATIONS  (PRN):  acetaminophen     Tablet .. 650 milliGRAM(s) Oral every 6 hours PRN Temp greater or equal to 38C (100.4F), Mild Pain (1 - 3)  ALPRAZolam 0.25 milliGRAM(s) Oral every 8 hours PRN anxiety  polyethylene glycol 3350 17 Gram(s) Oral daily PRN Constipation      Vital Signs Last 24 Hrs  T(C): 37.2 (04 Oct 2022 04:52), Max: 37.2 (03 Oct 2022 20:35)  T(F): 98.9 (04 Oct 2022 04:52), Max: 98.9 (03 Oct 2022 20:35)  HR: 79 (04 Oct 2022 04:52) (76 - 98)  BP: 106/64 (04 Oct 2022 04:52) (96/60 - 107/77)  BP(mean): --  RR: 20 (04 Oct 2022 04:52) (0 - 21)  SpO2: 96% (04 Oct 2022 04:52) (92% - 100%)    Parameters below as of 04 Oct 2022 04:52  Patient On (Oxygen Delivery Method): BiPAP/CPAP      CAPILLARY BLOOD GLUCOSE      POCT Blood Glucose.: 137 mg/dL (04 Oct 2022 06:13)  POCT Blood Glucose.: 177 mg/dL (04 Oct 2022 01:55)  POCT Blood Glucose.: 191 mg/dL (04 Oct 2022 01:06)  POCT Blood Glucose.: 67 mg/dL (03 Oct 2022 23:54)  POCT Blood Glucose.: 55 mg/dL (03 Oct 2022 23:53)  POCT Blood Glucose.: 108 mg/dL (03 Oct 2022 17:35)  POCT Blood Glucose.: 54 mg/dL (03 Oct 2022 17:33)  POCT Blood Glucose.: 186 mg/dL (03 Oct 2022 12:04)  POCT Blood Glucose.: 131 mg/dL (03 Oct 2022 06:20)    I&O's Summary    02 Oct 2022 07:01  -  03 Oct 2022 07:00  --------------------------------------------------------  IN: 0 mL / OUT: 625 mL / NET: -625 mL    03 Oct 2022 07:01  -  04 Oct 2022 06:15  --------------------------------------------------------  IN: 0 mL / OUT: 100 mL / NET: -100 mL        PHYSICAL EXAM:  HEAD:  Atraumatic, Normocephalic  NECK: Supple, No   JVD  CHEST/LUNG:   no     rales,     no,    rhonchi  HEART: Regular rate and rhythm;         murmur  ABDOMEN: Soft, Nontender, ;   EXTREMITIES:   no     edema  NEUROLOGY:  alert    LABS:                        See note  See Note )-----------( See note    ( 03 Oct 2022 11:22 )             See note    10-03    139  |  103  |  37<H>  ----------------------------<  191<H>  4.6   |  24  |  0.76    Ca    8.8      03 Oct 2022 11:22                      Thyroid Stimulating Hormone, Serum: 0.32 uIU/mL (09-24 @ 11:34)          Consultant(s) Notes Reviewed:      Care Discussed with Consultants/Other Providers:

## 2022-10-04 NOTE — CONSULT NOTE ADULT - PROBLEM SELECTOR RECOMMENDATION 2
Continue home-dose synthroid, will check TFTs.
Previously worked up where pt is not a candidate for surgery, stenting with more risk than benefit  - pulmonary following, AVAPS QHS and prn, steroid taper, nebs

## 2022-10-04 NOTE — CONSULT NOTE ADULT - PROBLEM SELECTOR RECOMMENDATION 9
No acute ENT intervention indicated   Recommend Pulmonary consult   Reconsult ENT PRN
In setting of prolonged debility, recurrent episodes of respiratory failure, multiple comorbidities and prolonged hospitalization  - PPS 30-40%  - supportive care
Will check A1C.  Will switch to NPH 6u q6 and continue coverage.  Will continue monitoring FS and FU, will adjust dose as steroids are tapered/dc.  Hold NPH if off tube feeds.

## 2022-10-04 NOTE — CONSULT NOTE ADULT - CONSULT REQUESTED BY NAME
Dr. Segundo
Melinda Segundo
dr fernandes
family (son Gabriele) - Dr. Segundo
ED
melecio fernandes
Dr. Melinda Segundo
Dr. Segundo
Dr Segundo

## 2022-10-04 NOTE — CONSULT NOTE ADULT - TIME-BASED
PATIENT INFORMATION    Anticipatory guidance discussed  Be outdoors daily!   Wear helmets and use caution around water to prevent drowning.  Daily sunscreen and reapply in Summer and mosquito sprays as needed.    Plan:  Vaccinations are up to date and next set of routine vaccinations due at 6 years old--MMR, Varicella and Dtap-Polio.    Annual Flu vaccine is encouraged  Encouraged reading, playing and limited screen time every day.  Promoted good healthy eating choices.   Dental visits every 6 months and brushing twice daily.  Next well check at 30 months/ 2.5 years old.  Pepper Tenorio MD        Follow-Up  - Return for your 2 1/2 year (30 months) well child visit.    4 years old Health and Safety Tips - The following hyperlinks are available to access via [employer]    Parent Education from Healthy Parent    Educación para padres sobre niños sanos      Common dosing for acetaminophen and ibuprofen:   Acetaminophen (Tylenol) can be given every 4 hours.  · Infant or Children's Elixir: 160mg/5ml for  Weight 18-23 lbs 24-35 lbs 36-47 lbs   Dose 3.75 ml 5 ml 7.5 ml      Weight 48-59 lbs 60-71 lsb 72-95 lbs   Dose 10 ml 12.5 ml 15 ml     Ibuprofen (Motrin) can be given every 6 hours.  Safe for children 6 months and older.  · Infant Drops: 50mg/1.25ml  Weight 12-17 lbs 18-23 lbs   Dose 1.25 ml 1.875 ml     · Children's Elixir 100mg/5ml   Weight 12-17 lbs 18-23 lbs 24-35 lbs 36-47 lbs   Dose 2.5 ml 3.75 ml 5 ml 7.5 ml        Weight 48-59 lbs 60-71 lbs 72-95 lbs   Dose 10 ml 12.5 ml 15 ml     Additional Educational Resources:  For additional resources regarding your symptoms, diagnosis, or further health information, please visit the Discover a Healthier You section on [email or web address] or the Online Health Resources section in [employer].     75

## 2022-10-04 NOTE — CONSULT NOTE ADULT - SUBJECTIVE AND OBJECTIVE BOX
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     Of note, pt has multiple recent admissions with similar presentation. Most recently was hospitalized from 8/28/22-9/8/22 for SOB, treated with airway clearance and completed 5-day course of Zosyn for empiric coverage of PNA (though per Pulm, unlikely to have PNA). Course c/b PEG displacement and subsequent reinsertion by IR on 8/30. In addition to her usual PEG feeds, pt was also started on puree diet with moderately thickened fluids for pleasure feeds. Also was started on anastrozole for most likely dx of metastatic breast cancer to bones (pt was unable to tolerate further cancer workup of spine lesions). Pt remained full code during recent admissions.    For her prolonged hospital course thus far, pt is followed by pulmonology, cardiology, and endocrinology. She was treated for COVID and has been on AVAPS with steroids now being tapered, and scheduled nebs. Discharge back to [facility] has been limited by tenuous respiratory status. Geriatrics and Palliative Medicine Team is consulted to assist with GOC.     Pt seen at bedside this morning, sleeping but awakens easily to verbal stimuli. She is oriented to person, time and place, answering simple questions appropriately. Admits to dyspnea, denies other source of discomfort. She recognizes she is hospitalized due to a respiratory issue but unable further characterize. She frequently defers to her son Kp as he has been speaking with the doctors. Attempted to ask pt about her wishes on mechanical ventilatory support but she was not able to grasp the complexity of the topic. She gave permission to call her son Kp for further conversation.     Calls placed to sons Kp and Sy but no answer, voicemail left with instructions to call back.     PERTINENT PM/SXH:   MI (myocardial infarction)    HTN (hypertension)    Personal history of asbestosis    Gout    Dyslipidemia    UTI (lower urinary tract infection)    ETOH abuse    CVA (cerebral vascular accident)    Tracheobronchomalacia    HTN (hypertension)    HLD (hyperlipidemia)      History of left shoulder fracture    History of benign breast tumor      FAMILY HISTORY:  No pertinent family history in first degree relatives      Family Hx substance abuse [ ]yes [ ]no  ITEMS NOT CHECKED ARE NOT PRESENT    SOCIAL HISTORY:   Significant other/partner[ ]  Children[ x]  Uatsdin/Spirituality:  Substance hx:  [ ]   Tobacco hx:  [ ]   Alcohol hx: [ ]   Home Opioid hx:  [ ] I-Stop Reference No:  Living Situation: [ ]Home  [ x]Long term care  [ ]Rehab [ ]Other    ADVANCE DIRECTIVES:    DNR/MOLST  [ ]  Living Will  [ ]   DECISION MAKER(s):  [ ] Health Care Proxy(s)  [ ] Surrogate(s)  [ ] Guardian           Name(s): Phone Number(s):    BASELINE (I)ADL(s) (prior to admission):  [place]: [ ]Total  [ ] Moderate [ x]Dependent    Allergies    Toradol (Urticaria (Mild to Mod); Rash (Mild to Mod))    Intolerances    MEDICATIONS  (STANDING):  albuterol/ipratropium for Nebulization 3 milliLiter(s) Nebulizer every 6 hours  allopurinol 100 milliGRAM(s) Oral daily  anastrozole 1 milliGRAM(s) Oral daily  atorvastatin 20 milliGRAM(s) Oral at bedtime  bisacodyl 5 milliGRAM(s) Oral at bedtime  buDESOnide    Inhalation Suspension 0.5 milliGRAM(s) Inhalation every 12 hours  chlorhexidine 2% Cloths 1 Application(s) Topical <User Schedule>  dextrose 5%. 1000 milliLiter(s) (50 mL/Hr) IV Continuous <Continuous>  dextrose 50% Injectable 25 Gram(s) IV Push once  dextrose 50% Injectable 25 Gram(s) IV Push once  dextrose 50% Injectable 12.5 Gram(s) IV Push once  dextrose 50% Injectable 25 Gram(s) IV Push once  dextrose Oral Gel 15 Gram(s) Oral once  digoxin     Tablet 125 MICROGram(s) Oral daily  diltiazem    Tablet 30 milliGRAM(s) Enteral Tube every 6 hours  glucagon  Injectable 1 milliGRAM(s) IntraMuscular once  guaiFENesin Oral Liquid (Sugar-Free) 300 milliGRAM(s) Oral every 6 hours  insulin lispro (ADMELOG) corrective regimen sliding scale   SubCutaneous every 6 hours  insulin NPH human recombinant 7 Unit(s) SubCutaneous every 6 hours  levETIRAcetam  Solution 750 milliGRAM(s) Oral two times a day  levothyroxine 75 MICROGram(s) Oral daily  melatonin 3 milliGRAM(s) Oral at bedtime  prednisoLONE    3 mG/mL Solution (ORAPRED) 45 milliGRAM(s) Oral daily  prednisoLONE    3 mG/mL Solution (ORAPRED)   Oral   rivaroxaban 20 milliGRAM(s) Oral with dinner  senna 2 Tablet(s) Oral at bedtime    MEDICATIONS  (PRN):  acetaminophen     Tablet .. 650 milliGRAM(s) Oral every 6 hours PRN Temp greater or equal to 38C (100.4F), Mild Pain (1 - 3)  ALPRAZolam 0.25 milliGRAM(s) Oral every 8 hours PRN anxiety  polyethylene glycol 3350 17 Gram(s) Oral daily PRN Constipation    PRESENT SYMPTOMS: [ ]Unable to self-report  [ ] CPOT [ ] PAINADs [ ] RDOS  Source if other than patient:  [ ]Family   [ ]Team     Pain: [ ]yes [ x]no  QOL impact -   Location -                    Aggravating factors -  Quality -  Radiation -  Timing-  Severity (0-10 scale):  Minimal acceptable level (0-10 scale):     Dyspnea:                           [ ]Mild [x ]Moderate [ ]Severe  Anxiety:                             [ ]Mild [ ]Moderate [ ]Severe  Fatigue:                             [ ]Mild [ ]Moderate [ ]Severe  Nausea:                             [ ]Mild [ ]Moderate [ ]Severe  Loss of appetite:              [ ]Mild [ ]Moderate [ ]Severe  Constipation:                    [ ]Mild [ ]Moderate [ ]Severe    PCSSQ[Palliative Care Spiritual Screening Question]   Severity (0-10):  Score of 4 or > indicate consideration of Chaplaincy referral.  Chaplaincy Referral: [ ] yes [ ] refused [ ] following [x ] Deferred     Caregiver [place]? : [ ] yes [ ] no [x ] Deferred [ ] Declined             Social work referral [ ] Patient & Family Centered Care Referral [ ]     Anticipatory Grief present?:  [ ] yes [ ] no  [ x] Deferred                  Social work referral [ ] Chaplaincy Referral[ ]      Other Symptoms:  [x ]All other review of systems negative     Palliative Performance Status Version 2:     40    %    http://npcrc.org/files/news/palliative_performance_scale_ppsv2.pdf    PHYSICAL EXAM:  Vital Signs Last 24 Hrs  T(C): 37.2 (04 Oct 2022 11:20), Max: 37.2 (03 Oct 2022 20:35)  T(F): 98.9 (04 Oct 2022 11:20), Max: 98.9 (03 Oct 2022 20:35)  HR: 86 (04 Oct 2022 11:20) (76 - 93)  BP: 106/64 (04 Oct 2022 04:52) (96/60 - 107/77)  BP(mean): --  RR: 19 (04 Oct 2022 11:20) (19 - 21)  SpO2: 94% (04 Oct 2022 11:20) (94% - 100%)    Parameters below as of 04 Oct 2022 11:20  Patient On (Oxygen Delivery Method): nasal cannula     I&O's Summary    03 Oct 2022 07:01  -  04 Oct 2022 07:00  --------------------------------------------------------  IN: 0 mL / OUT: 100 mL / NET: -100 mL      GENERAL: [ ]Cachexia    [x ]Alert  [x ]Oriented x3   [ ]Lethargic  [ ]Unarousable  [ x]Verbal  [ ]Non-Verbal  Behavioral:   [ ] Anxiety  [ ] Delirium [ ] Agitation [ ] Other  HEENT:  [ ]Normal   [ x]Dry mouth   [ ]ET Tube/Trach  [ ]Oral lesions  PULMONARY:   [ ]Clear [ ]Tachypnea  [ ]Audible excessive secretions   [ ]Rhonchi        [ ]Right [ ]Left [ ]Bilateral  [ ]Crackles        [ ]Right [ ]Left [ ]Bilateral  [x ]Wheezing     [ ]Right [ ]Left [x ]Bilateral  [x ]Diminished breath sounds [ ]right [ ]left [x ]bilateral  CARDIOVASCULAR:    [x ]Regular [ ]Irregular [ ]Tachy  [ ]Dylon [ ]Murmur [ ]Other  GASTROINTESTINAL:  [ x]Soft  [ ]Distended   [x ]+BS  [ ]Non tender [ ]Tender  [ ]Other [ ]PEG [ ]OGT/ NGT  Last BM:  GENITOURINARY:  [ ]Normal [x ] Incontinent   [ ]Oliguria/Anuria   [ ]Basurto  MUSCULOSKELETAL:   [ ]Normal   [ ]Weakness  [ x]Bed/Wheelchair bound [ ]Edema  NEUROLOGIC:   [ ]No focal deficits  [x ]Cognitive impairment  [ ]Dysphagia [ ]Dysarthria [ ]Paresis [ ]Other   SKIN:   [ ]Normal  [ ]Rash  [ ]Other  [ ]Pressure ulcer(s)       Present on admission [ ]y [ ]n    CRITICAL CARE:  [ ] Shock Present  [ ]Septic [ ]Cardiogenic [ ]Neurologic [ ]Hypovolemic  [ ]  Vasopressors [ ]  Inotropes   [ ]Respiratory failure present [ ]Mechanical ventilation [ ]Non-invasive ventilatory support [ ]High flow    [ ]Acute  [ ]Chronic [ ]Hypoxic  [ ]Hypercarbic [ ]Other  [ ]Other organ failure     LABS:                        7.9    8.39  )-----------( 140      ( 04 Oct 2022 12:15 )             26.4   10-04    136  |  99  |  34<H>  ----------------------------<  310<H>  5.1   |  23  |  0.73    Ca    8.4      04 Oct 2022 12:15      RADIOLOGY & ADDITIONAL STUDIES:    < from: CT Chest No Cont (09.28.22 @ 20:04) >  FINDINGS:    AIRWAYS, LUNGS and PLEURA: Patent central airways. Stable subsegmental   atelectasis within posterior upper lobes, lingula and basilar lower   lobes. Trace fluid within the left fissure. Otherwise, no significant   pleural effusion or pneumothorax.    MEDIASTINUM AND GIOVANA: No lymphadenopathy.    HEART AND VESSELS: Heart size is normal. No pericardial effusion.   Thoracic aorta and pulmonary artery normal in diameter. Calcifications of   the coronary arteries and aorta.    VISUALIZED UPPER ABDOMEN: Within normal limits.    CHEST WALL AND BONES: New T8 compression deformity. Redemonstration of T2   and T3 compression fractures. Unchanged mild T11 wedge deformity.   Redemonstration of multiple lytic lesions in the thoracic spine,   unchanged from 9/14/2022. Fixation plate and screws within the left   proximal humerus. Status post right-sided mastectomy.    LOWER NECK: Within normal limits.    MISCELLANEOUS: Near-complete opacification of the right maxillary sinus.   Mucosal thickening of the left maxillary sinus.    IMPRESSION:    The airways are patent without mucoid impaction.    Mild bilateral areas of subsegmental atelectasis. The lungs are otherwise   clear.    Diffuse lytic bone lesions are again noted. Mild-moderate loss of body   height of T8 vertebral body is new from prior exam.    --- End of Report ---      < end of copied text >      PROTEIN CALORIE MALNUTRITION PRESENT: [ ]mild [ ]moderate [ ]severe [ ]underweight [ ]morbid obesity  https://www.andeal.org/vault/2440/web/files/ONC/Table_Clinical%20Characteristics%20to%20Document%20Malnutrition-White%20JV%20et%20al%856397.pdf    Height (cm): 162.6 (09-08-22 @ 20:27), 162.6 (08-28-22 @ 15:12), 162.6 (03-16-22 @ 19:05)  Weight (kg): 93.213 (09-09-22 @ 15:52), 80 (08-28-22 @ 15:12), 102.6 (03-21-22 @ 10:46)  BMI (kg/m2): 35.3 (09-09-22 @ 15:52), 30.3 (09-08-22 @ 20:27), 30.3 (08-28-22 @ 15:12)    [ ]PPSV2 < or = to 30% [ ]significant weight loss  [ ]poor nutritional intake  [ ]anasarca[ ]Artificial Nutrition      Other REFERRALS:  [ ]Hospice  [ ]Child Life  [ ]Social Work  [ ]Case management [ ]Holistic Therapy

## 2022-10-04 NOTE — CONSULT NOTE ADULT - ASSESSMENT
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 For her prolonged hospital course thus far, pt is followed by pulmonology, cardiology, and endocrinology. She was treated for COVID and has been on AVAPS with steroids now being tapered, and scheduled nebs. Discharge back to [facility] has been limited by tenuous respiratory status. Geriatrics and Palliative Medicine Team is consulted to assist with GOC.

## 2022-10-04 NOTE — CONSULT NOTE ADULT - PROBLEM SELECTOR RECOMMENDATION 3
Seen by oncology during this admission, pt is on arimidex daily for presumed metastatic breast cancer with lytic bone lesions. She is not a candidate for further w/u with MRI nor XRT
Suggest to continue medications, monitoring, FU primary team/Pulm recommendations.

## 2022-10-04 NOTE — CONSULT NOTE ADULT - CONSULT REQUESTED DATE/TIME
09-Sep-2022
11-Sep-2022 10:04
23-Sep-2022 17:37
17-Sep-2022 08:45
26-Sep-2022 17:31
08-Sep-2022 21:44
12-Sep-2022 12:14
23-Sep-2022 13:38
04-Oct-2022 14:45

## 2022-10-04 NOTE — PROGRESS NOTE ADULT - SUBJECTIVE AND OBJECTIVE BOX
Chief complaint  Patient is a 87y old  Female who presents with a chief complaint of SOB (04 Oct 2022 08:16)   Review of systems  Patient in bed, had hypoglycemic episodes.    Labs and Fingersticks  CAPILLARY BLOOD GLUCOSE      POCT Blood Glucose.: 360 mg/dL (04 Oct 2022 12:08)  POCT Blood Glucose.: 137 mg/dL (04 Oct 2022 06:13)  POCT Blood Glucose.: 177 mg/dL (04 Oct 2022 01:55)  POCT Blood Glucose.: 191 mg/dL (04 Oct 2022 01:06)  POCT Blood Glucose.: 67 mg/dL (03 Oct 2022 23:54)  POCT Blood Glucose.: 55 mg/dL (03 Oct 2022 23:53)  POCT Blood Glucose.: 108 mg/dL (03 Oct 2022 17:35)  POCT Blood Glucose.: 54 mg/dL (03 Oct 2022 17:33)      Anion Gap, Serum: 14 (10-04 @ 12:15)  Anion Gap, Serum: 12 (10-03 @ 11:22)      Calcium, Total Serum: 8.4 (10-04 @ 12:15)  Calcium, Total Serum: 8.8 (10-03 @ 11:22)          10-04    136  |  99  |  34<H>  ----------------------------<  310<H>  5.1   |  23  |  0.73    Ca    8.4      04 Oct 2022 12:15                          7.9    8.39  )-----------( 140      ( 04 Oct 2022 12:15 )             26.4     Medications  MEDICATIONS  (STANDING):  albuterol/ipratropium for Nebulization 3 milliLiter(s) Nebulizer every 6 hours  allopurinol 100 milliGRAM(s) Oral daily  anastrozole 1 milliGRAM(s) Oral daily  atorvastatin 20 milliGRAM(s) Oral at bedtime  bisacodyl 5 milliGRAM(s) Oral at bedtime  buDESOnide    Inhalation Suspension 0.5 milliGRAM(s) Inhalation every 12 hours  chlorhexidine 2% Cloths 1 Application(s) Topical <User Schedule>  dextrose 5%. 1000 milliLiter(s) (50 mL/Hr) IV Continuous <Continuous>  dextrose 50% Injectable 25 Gram(s) IV Push once  dextrose 50% Injectable 25 Gram(s) IV Push once  dextrose 50% Injectable 12.5 Gram(s) IV Push once  dextrose 50% Injectable 25 Gram(s) IV Push once  dextrose Oral Gel 15 Gram(s) Oral once  digoxin     Tablet 125 MICROGram(s) Oral daily  diltiazem    Tablet 30 milliGRAM(s) Enteral Tube every 6 hours  glucagon  Injectable 1 milliGRAM(s) IntraMuscular once  guaiFENesin Oral Liquid (Sugar-Free) 300 milliGRAM(s) Oral every 6 hours  insulin lispro (ADMELOG) corrective regimen sliding scale   SubCutaneous every 6 hours  insulin NPH human recombinant 7 Unit(s) SubCutaneous every 6 hours  levETIRAcetam  Solution 750 milliGRAM(s) Oral two times a day  levothyroxine 75 MICROGram(s) Oral daily  melatonin 3 milliGRAM(s) Oral at bedtime  prednisoLONE    3 mG/mL Solution (ORAPRED) 45 milliGRAM(s) Oral daily  prednisoLONE    3 mG/mL Solution (ORAPRED)   Oral   rivaroxaban 20 milliGRAM(s) Oral with dinner  senna 2 Tablet(s) Oral at bedtime      Physical Exam  General: Patient comfortable in bed  Vital Signs Last 12 Hrs  T(F): 98.9 (10-04-22 @ 11:20), Max: 98.9 (10-04-22 @ 04:52)  HR: 86 (10-04-22 @ 11:20) (77 - 86)  BP: 106/64 (10-04-22 @ 04:52) (106/64 - 106/64)  BP(mean): --  RR: 19 (10-04-22 @ 11:20) (19 - 20)  SpO2: 94% (10-04-22 @ 11:20) (94% - 100%)           Chief complaint  Patient is a 87y old  Female who presents with a chief complaint of SOB (04 Oct 2022 08:16)   Review of systems  Patient in bed, had hypoglycemic episodes.    Labs and Fingersticks  CAPILLARY BLOOD GLUCOSE      POCT Blood Glucose.: 360 mg/dL (04 Oct 2022 12:08)  POCT Blood Glucose.: 137 mg/dL (04 Oct 2022 06:13)  POCT Blood Glucose.: 177 mg/dL (04 Oct 2022 01:55)  POCT Blood Glucose.: 191 mg/dL (04 Oct 2022 01:06)  POCT Blood Glucose.: 67 mg/dL (03 Oct 2022 23:54)  POCT Blood Glucose.: 55 mg/dL (03 Oct 2022 23:53)  POCT Blood Glucose.: 108 mg/dL (03 Oct 2022 17:35)  POCT Blood Glucose.: 54 mg/dL (03 Oct 2022 17:33)      Anion Gap, Serum: 14 (10-04 @ 12:15)  Anion Gap, Serum: 12 (10-03 @ 11:22)      Calcium, Total Serum: 8.4 (10-04 @ 12:15)  Calcium, Total Serum: 8.8 (10-03 @ 11:22)          10-04    136  |  99  |  34<H>  ----------------------------<  310<H>  5.1   |  23  |  0.73    Ca    8.4      04 Oct 2022 12:15                          7.9    8.39  )-----------( 140      ( 04 Oct 2022 12:15 )             26.4     Medications  MEDICATIONS  (STANDING):  albuterol/ipratropium for Nebulization 3 milliLiter(s) Nebulizer every 6 hours  allopurinol 100 milliGRAM(s) Oral daily  anastrozole 1 milliGRAM(s) Oral daily  atorvastatin 20 milliGRAM(s) Oral at bedtime  bisacodyl 5 milliGRAM(s) Oral at bedtime  buDESOnide    Inhalation Suspension 0.5 milliGRAM(s) Inhalation every 12 hours  chlorhexidine 2% Cloths 1 Application(s) Topical <User Schedule>  dextrose 5%. 1000 milliLiter(s) (50 mL/Hr) IV Continuous <Continuous>  dextrose 50% Injectable 25 Gram(s) IV Push once  dextrose 50% Injectable 25 Gram(s) IV Push once  dextrose 50% Injectable 12.5 Gram(s) IV Push once  dextrose 50% Injectable 25 Gram(s) IV Push once  dextrose Oral Gel 15 Gram(s) Oral once  digoxin     Tablet 125 MICROGram(s) Oral daily  diltiazem    Tablet 30 milliGRAM(s) Enteral Tube every 6 hours  glucagon  Injectable 1 milliGRAM(s) IntraMuscular once  guaiFENesin Oral Liquid (Sugar-Free) 300 milliGRAM(s) Oral every 6 hours  insulin lispro (ADMELOG) corrective regimen sliding scale   SubCutaneous every 6 hours  insulin NPH human recombinant 7 Unit(s) SubCutaneous every 6 hours  levETIRAcetam  Solution 750 milliGRAM(s) Oral two times a day  levothyroxine 75 MICROGram(s) Oral daily  melatonin 3 milliGRAM(s) Oral at bedtime  prednisoLONE    3 mG/mL Solution (ORAPRED) 45 milliGRAM(s) Oral daily  prednisoLONE    3 mG/mL Solution (ORAPRED)   Oral   rivaroxaban 20 milliGRAM(s) Oral with dinner  senna 2 Tablet(s) Oral at bedtime      Physical Exam  General: Patient comfortable in bed  Vital Signs Last 12 Hrs  T(F): 98.9 (10-04-22 @ 11:20), Max: 98.9 (10-04-22 @ 04:52)  HR: 86 (10-04-22 @ 11:20) (77 - 86)  BP: 106/64 (10-04-22 @ 04:52) (106/64 - 106/64)  BP(mean): --  RR: 19 (10-04-22 @ 11:20) (19 - 20)  SpO2: 94% (10-04-22 @ 11:20) (94% - 100%)

## 2022-10-04 NOTE — PROGRESS NOTE ADULT - ASSESSMENT
89    year old female    h/o hemorrhagic CVA, has  PEG,  HTN, MI,   HLD, gout, right ca  breast   right Ca breast. s/p mastectomy in 2019,  s/p  sentinel node  localization.  4/4,  were  negative  s/p rrt  . in past,  for hypoxia. cxr with complete  collapsed  of  left lung, needing broch,   s p  bronchoscopy , pt  with  tracheomalacia  and  collapsed  airways,  makes  this a  difficult  situation, as there is no good rx for this     h/o bacteremia. on   pna, perforated  appendicitis,  ?  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 this will not alter rx. pt  redvd  extended  course  of ab   on iv  vanco and ertapenem.  till  2/9/.22.        *  admitted with   presumed  resp  failure/ pt was  sent back from [facility], the same day   pt seen in er.  appears   at her naseline.  no  wheezing /  atousable    ENT eval w/ laryngoscopy notable for vocal cords mobile and intact, no edema, upper airway patent     hypertonic saline for airway clearance   cxr, no pna     *   s/p cva, has  PEG,  npo  ,   on  synthroid, Keppra  ,  *   HTN, on  cardizem,  per  card dr jamison  *  h/o  Ca breast. /, s/p  R  mastectomy  *   obesity, bmi  was   41, now  is  30   *     c/c left  leg contracture ,  remains  bed  bound. functional  paraplegia,  needs  assistance  for  all her  adl's   *  pt  with  h/o  tracheomalacia  and  collapsed  airways, propensity  for  lung collpse,   pt is  at risk for  re admissions     on nocturnal  bipap,  difficult  situation, a s there  is no  good  rx  for  pt's  recurrent lung collapse hypoxia     h/o  of  chronic    mild  tachycardia          *   CT chest. lytic  lesions, T  spine/  ribs/ humerus/  oncoloigy  d r ohri.  suspect  metastatic ca breast        son. guicho Dan/ oncologist  has  also  discussed  with  son,  futility of  pursuing  bx. a s pt  is  not an ideal   candidate  for  chemo    CT  A.p ,/ prelim ,  pelvic  mets  per  oncology,   suspect  metastatic ca  breast,  was  awaiting   mri  spine/  pt unable  to tolerate  mri   per oncology, no  further  intervention/  and on  Arimidex, now,   per d r ohri   obesity,  bed bound.  functional paraplegia    per  son,   rehab promptly  returned   to er/ as they  did not  have  a  bipap machine /  care cortn to f/.p, needs  24/7  O2/ nocturnal bipap   difficult  situation,  bed  bound. obesity. c/c  hypercarbia, needing , prn /  nocturnal bipap/  with intermittent tachypnea    now  is  covid +. on iv steroid/ was  wheezing/ remdissivir/, seen by  house  ID   pt  with  frequent episodes  of tachypnea. has  obstructive /restrictive   lung  disease ,  obesity/ RAQUEL/ hypercarbia   called son, mlple  times,  message  left/    pt  remains  at  her  baseline ,   verbal  at  times  Afib, on xarelto  20m mg qd    s/p tachycardia, on   cardizem  and digoxin/  remains on iv steroid  per  pulm    pt  with poor  access   and  IR  note  seen, pt unable to lie  supine   pt at  baseline,  has  never  been  able  to  lie  supine,  given all  her  co  morbidities,   and pt  has  been  optimized   to  fullest  degree possible     on   prednisolone,  via  peg  pt 's  situation is tenuous   there  ie  no  room  for further    intervention. , CT  chest  prelim, atelectasis  pt  with  ongoing intermittent tachycardia/  worsening  tachypnea/    s/p  rrt on 9/30  for tachypnea     cbc pending   pt  with  tachypnea  mostly,  bed  bound,  hence  difficult  to  initiate   any   d/c  planning   son remains  unavailable  will  ask palliative   care team  to  assist with  goc. at  this  juncture, given pt;s  condition, difficult  to initiaite   d/c       per  son, pt is  full code                    89    year old female    h/o hemorrhagic CVA, has  PEG,  HTN, MI,   HLD, gout, right ca  breast   right Ca breast. s/p mastectomy in 2019,  s/p  sentinel node  localization.  4/4,  were  negative  s/p rrt  . in past,  for hypoxia. cxr with complete  collapsed  of  left lung, needing broch,   s p  bronchoscopy , pt  with  tracheomalacia  and  collapsed  airways,  makes  this a  difficult  situation, as there is no good rx for this     h/o bacteremia. on   pna, perforated  appendicitis,  ?  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 this will not alter rx. pt  redvd  extended  course  of ab   on iv  vanco and ertapenem.  till  2/9/.22.        *  admitted with   presumed  resp  failure/ pt was  sent back from [facility], the same day   pt seen in er.  appears   at her naseline.  no  wheezing /  atousable    ENT eval w/ laryngoscopy notable for vocal cords mobile and intact, no edema, upper airway [place]  *   s/p cva, has  PEG,  npo,  on  synthroid, Keppra  ,  *   HTN, on  cardizem,  per  card dr jamison  *     h/o  Ca breast. /, s/p  R  mastectomy  *   obesity, bmi  was   41, now  is  30   *    c/c left  leg contracture ,  remains  bed  bound. functional  paraplegia,  needs  assistance  for  all her  adl's   *  pt  with  h/o  tracheomalacia  and  collapsed  airways, propensity  for  lung collpse,   pt is  at risk for  re admissions     on nocturnal  bipap,  difficult  situation, a s there  is no  good  rx  for  pt's  recurrent lung collapse hypoxia     h/o  of  chronic   mild  tachycardia      *   CT chest. lytic  lesions, T  spine/  ribs/ humerus/  oncoloigy  d r ohri.  suspect  metastatic ca breast          son. guicho Dan/ oncologist  has  also  discussed  with  son,  futility of  pursuing  bx. a s pt  is  not an ideal   candidate  for    chemo// CT  A.p ,/ prelim ,  pelvic  mets  per  oncology,   suspect  metastatic ca  breast,  was  awaiting   mri  spine/  pt unable  to tolerate  mri   per oncology, no  further  intervention/  and on  Arimidex, now,   per d r ohri   obesity,  bed bound.  functional paraplegia    per  son,   rehab promptly  returned   to er/ as they  did not  have  a  bipap machine /  care cortn to f/.p, needs  24/7  O2/ nocturnal bipap   difficult  situation,  bed  bound. obesity. c/c  hypercarbia, needing , prn /  nocturnal bipap/  with intermittent tachypnea    now  is  covid +. on iv steroid/ was  wheezing/ remdissivir/, seen by  house  ID   pt  with  frequent episodes  of tachypnea. has  obstructive /restrictive   lung  disease ,  obesity/ RAQUEL/ hypercarbia   called son, [place]  times,  message  left/    pt  remains  at  her  baseline ,   verbal  at  times  Afib, on xarelto  20m mg qd    s/p tachycardia, on   cardizem  and digoxin/  remains on iv steroid  per  pulm    pt  with poor  access   and  IR  note  seen, pt unable to lie  supine   pt at  baseline,  has  never  been  able  to  lie  supine,  given all  her  co  morbidities,   and pt  has  been  optimized   to  fullest  degree possible     on   prednisolone,  via  peg  pt 's  situation is tenuous   there  ie  no  room  for further    intervention. , CT  chest  prelim, atelectasis  pt  with  ongoing intermittent tachycardia/  worsening  tachypnea/    s/p  rrt on 9/30  for tachypnea     cbc pending   pt  with  tachypnea  mostly,  bed  bound,  hence  difficult  to  initiate   any   d/c  planning   son remains  unavailable, lately   pt is  ideal  for palliative care  will  ask palliative   care team  to  assist with  goc. at  this  juncture, given pt;s  condition, difficult  to initiaite   d/c planning,  given  he r unstable condition       per  son,.  from before  pt is  full code

## 2022-10-04 NOTE — PROGRESS NOTE ADULT - SUBJECTIVE AND OBJECTIVE BOX
[facility] - PROGRESS NOTE    CHIEF COMPLAINT: COVID infection; chronic hypoxic/hypercapnic respiratory failure; mucous plugging; atelectasis; tracheobronchomalacia; bronchospasm; weak cough; 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; PEG in place    INTERVAL HISTORY: speaking in full sentences; wants to go back to [place]; flat in bed; neck flexed onto the anterior chest wall; looks enormously uncomfortable with tachypnea and increased work of breathing; no cough, sputum production, hemoptysis, chest congestion or wheeze; no fevers, chills or sweats; no chest pain/pressure or palpitations; asking for ICE chips.     REVIEW OF SYSTEMS:  Constitutional: As per interval history  HEENT: Within normal limits  CV: As per interval history  Resp: As per interval history  GI: dysphagia  : Within normal limits  Musculoskeletal: Within normal limits  Skin: Within normal limits  Neurological: CVA - left sided plegia  Psychiatric: Within normal limits  Endocrine: Within normal limits  Hematologic/Lymphatic: Within normal limits  Allergic/Immunologic: Within normal limits    MEDICATIONS:     Pulmonary "  albuterol/ipratropium for Nebulization 3 milliLiter(s) Nebulizer every 6 hours  buDESOnide    Inhalation Suspension 0.5 milliGRAM(s) Inhalation every 12 hours  guaiFENesin Oral Liquid (Sugar-Free) 300 milliGRAM(s) Oral every 6 hours    Anti-microbials:    Cardiovascular:  digoxin     Tablet 125 MICROGram(s) Oral daily  diltiazem    Tablet 30 milliGRAM(s) Enteral Tube every 6 hours    Other:  allopurinol 100 milliGRAM(s) Oral daily  anastrozole 1 milliGRAM(s) Oral daily  atorvastatin 20 milliGRAM(s) Oral at bedtime  bisacodyl 5 milliGRAM(s) Oral at bedtime  chlorhexidine 2% Cloths 1 Application(s) Topical <User Schedule>  dextrose 5%. 1000 milliLiter(s) IV Continuous <Continuous>  dextrose 50% Injectable 25 Gram(s) IV Push once  dextrose 50% Injectable 25 Gram(s) IV Push once  dextrose 50% Injectable 12.5 Gram(s) IV Push once  dextrose 50% Injectable 25 Gram(s) IV Push once  dextrose Oral Gel 15 Gram(s) Oral once  glucagon  Injectable 1 milliGRAM(s) IntraMuscular once  insulin lispro (ADMELOG) corrective regimen sliding scale   SubCutaneous every 6 hours  insulin NPH human recombinant 7 Unit(s) SubCutaneous every 6 hours  levETIRAcetam  Solution 750 milliGRAM(s) Oral two times a day  levothyroxine 75 MICROGram(s) Oral daily  melatonin 3 milliGRAM(s) Oral at bedtime  prednisoLONE    3 mG/mL Solution (ORAPRED) 45 milliGRAM(s) Oral daily  prednisoLONE    3 mG/mL Solution (ORAPRED)   Oral   rivaroxaban 20 milliGRAM(s) Oral with dinner  senna 2 Tablet(s) Oral at bedtime    MEDICATIONS  (PRN):  acetaminophen     Tablet .. 650 milliGRAM(s) Oral every 6 hours PRN Temp greater or equal to 38C (100.4F), Mild Pain (1 - 3)  ALPRAZolam 0.25 milliGRAM(s) Oral every 8 hours PRN anxiety  polyethylene glycol 3350 17 Gram(s) Oral daily PRN Constipation    OBJECTIVE:    POCT Blood Glucose.: 137 mg/dL (04 Oct 2022 06:13)  POCT Blood Glucose.: 177 mg/dL (04 Oct 2022 01:55)  POCT Blood Glucose.: 191 mg/dL (04 Oct 2022 01:06)  POCT Blood Glucose.: 67 mg/dL (03 Oct 2022 23:54)  POCT Blood Glucose.: 55 mg/dL (03 Oct 2022 23:53)  POCT Blood Glucose.: 108 mg/dL (03 Oct 2022 17:35)  POCT Blood Glucose.: 54 mg/dL (03 Oct 2022 17:33)  POCT Blood Glucose.: 186 mg/dL (03 Oct 2022 12:04)      PHYSICAL EXAM:       ICU Vital Signs Last 24 Hrs  T(C): 37.2 (04 Oct 2022 11:20), Max: 37.2 (03 Oct 2022 20:35)  T(F): 98.9 (04 Oct 2022 11:20), Max: 98.9 (03 Oct 2022 20:35)  HR: 86 (04 Oct 2022 11:20) (76 - 93)  BP: 106/64 (04 Oct 2022 04:52) (96/60 - 107/77)  BP(mean): --  ABP: --  ABP(mean): --  RR: 19 (04 Oct 2022 11:20) (19 - 21)  SpO2: 94% (04 Oct 2022 11:20) (94% - 100%) on 3lpm nasal canula     General: Awake. Alert. Cooperative. Rapid and shallow breathing. Appears stated age. Obese. Laying flat in bed.  HEENT: Atraumatic. Normocephalic. Anicteric. Normal oral mucosa. PERRL. EOMI. Mallampati class IV airway. Poor dentition.  Neck: Supple. Trachea midline. Thyroid without enlargement/tenderness/nodules. No carotid bruit. No JVD. Short and wide. Neck flexed with chin resting on her anterior chest.  Cardiovascular: Regular rate and rhythm. S1 S2 normal. III/VI systolic murmur  Respiratory: Using accessory muscles of respiration. Clear anteriorly. Kyphosis. Poor chest wall excursion  Abdomen: Soft. Non-tender. Non-distended. No organomegaly. No masses. Normal bowel sounds. Obese. PEG.  Extremities: Warm to touch. No clubbing or cyanosis. No pedal edema. Large legs. Left leg contracted under the right leg  Pulses: 2+ peripheral pulses all extremities.	  Skin: Normal skin color. No rashes or lesions. No ecchymoses. No cyanosis. Warm to touch.  Lymph Nodes: Cervical, supraclavicular and axillary nodes normal  Neurological: Left lower extremity plegia with contraction. Left upper extremity is quite weak. A and O x 3    LABS:                          See note  See Note )-----------( See note    ( 03 Oct 2022 11:22 )             See note    CBC    WBC  See Note <==, QNS <==, 12.76 <==    Hemoglobin  See note <<==, See note <<==, 9.3 <<==    Hematocrit  See note <==, See note <==, 32.5 <==    Platelets  See note <==, QNS <==, 179 <==      139  |  103  |  37<H>  ----------------------------<  191<H>    10-03  4.6   |  24  |  0.76      LYTES    sodium  139 <==, 145 <==, 151 <==    potassium   4.6 <==, 4.9 <==, 4.3 <==    chloride  103 <==, 107 <==, 114 <==    carbon dioxide  24 <==, 20 <==, 20 <==    =============================================================================================  RENAL FUNCTION:    Creatinine:   0.76  <<==, 0.79  <<==, 0.63  <<==    BUN:   37 <==, 37 <==, 35 <==    ============================================================================================    calcium   8.8 <==, 8.6 <==, 8.7 <==    ============================================================================================  LFTs    AST:   24 <==     ALT:  20  <==     AP:  201  <=    Bili:  0.5  <=    PT/INR - ( 24 Sep 2022 11:34 )   PT: 11.3 sec;   INR: 0.98 ratio      Venous Blood Gas:  09-27 @ 11:15  7.42/45/61/29/90.4  VBG Lactate: 2.3    ABG - ( 21 Sep 2022 14:19 )  pH: 7.51  /  pCO2: 33    /  pO2: 158   / HCO3: 26    / Base Excess: 3.6   /  SaO2: 100.0     Venous Blood Gas:  09-19 @ 09:30  7.34/53/50/29/80.0  VBG Lactate: 3.5    Venous Blood Gas:  09-18 @ 03:50  7.39/48/47/29/78.2  VBG Lactate: 1.7    Procalcitonin, Serum: 0.23 ng/mL (09-18 @ 09:59)    D-Dimer Assay, Quantitative (09.18.22 @ 09:59)   D-Dimer Assay, Quantitative: 196:    C-Reactive Protein, Serum (09.18.22 @ 09:59)   C-Reactive Protein, Serum: 5 mg/L     Ferritin, Serum in AM (09.18.22 @ 09:59)   Ferritin, Serum: 63 ng/mL     < from: TTE with Doppler (w/Cont) (01.21.22 @ 14:08) >    Patient name: FRANKI MEHTA  YOB: 1935   Age: 86 (F)   MR#: [id number]  Study Date: 1/21/2022  ------------------------------------------------------------------------  Dimensions:    Normal Values:  LA:     2.5    2.0 - 4.0 cm  Ao:     3.3    2.0 - 3.8 cm  SEPTUM: 1.6    0.6 - 1.2 cm  PWT:    0.9    0.6 - 1.1 cm  LVIDd:  3.6    3.0 - 5.6 cm  LVIDs:  2.5    1.8 - 4.0 cm  Derived variables:  LVMI: 77 g/m2  RWT: 0.50  Fractional short: 31 %  EF (Visual Estimate): 70-75 %  ------------------------------------------------------------------------  Conclusions:  1. Concentric left ventricular hypertrophy.  2. Hyperdynamic left ventricular systolic function.  Endocardial visualization enhanced with intravenous  injection of Ultrasonic Enhancing Agent (Definity).  3. The right ventricle is not well visualized; grossly  normal right ventricular systolic function.  Pt refused to proceed with exam.  Limited Doppler study.  No trans aortic gradients.  Unable to rule out endocarditis.  ------------------------------------------------------------------------  Confirmed on 1/21/2022 - 15:42:57 by Kole Mcfarland M.D.  FASE  ------------------------------------------------------------------------  ---------------------------------------------------------------------------------------------------------------    MICROBIOLOGY:     COVID-19 PCR . (09.19.22 @ 18:31)   COVID-19 PCR: Detected:    Respiratory Viral Panel with COVID-19 by RYAN (09.08.22 @ 21:38)   Rapid RVP Result: [facility]   SARS-CoV-2: [facility]  This Respiratory Panel uses polymerase chain reaction (PCR) to detect for   adenovirus; coronavirus (HKU1, NL63, 229E, OC43); human metapneumovirus   (hMPV); human enterovirus/rhinovirus (Entero/RV); influenza A; influenza   A/H1; influenza A/H3; influenza A/H1-2009; influenza B; parainfluenza   viruses 1, 2, 3, 4; respiratory syncytial virus; Mycoplasma pneumoniae;   Chlamydophila pneumoniae; and SARS-CoV-2.     Culture - Blood (09.08.22 @ 20:39)   Specimen Source: .Blood Blood-Peripheral   Culture Results:   No growth to date.     Culture - Blood (09.08.22 @ 20:25)   Specimen Source: .Blood Blood-Peripheral   Culture Results:   No growth to date.     RADIOLOGY:  [x ] Chest radiographs reviewed and interpreted by me     EXAM:  CT CHEST                          PROCEDURE DATE:  09/28/2022      FINDINGS:    AIRWAYS, LUNGS and PLEURA: Patent central airways. Stable subsegmental   atelectasis within posterior upper lobes, lingula and basilar lower   lobes. Trace fluid within the left fissure. Otherwise, no significant   pleural effusion or pneumothorax.    MEDIASTINUM AND GIOVANA: No lymphadenopathy.    HEART AND VESSELS: Heart size is normal. No pericardial effusion.   Thoracic aorta and pulmonary artery normal in diameter. Calcifications of   the coronary arteries and aorta.    VISUALIZED UPPER ABDOMEN: Within normal limits.    CHEST WALL AND BONES: New T8 compression deformity. Redemonstration of T2   and T3 compression fractures. Unchanged mild T11 wedge deformity.   Redemonstration of multiple lytic lesions in the thoracic spine,   unchanged from 9/14/2022. Fixation plate and screws within the left   proximal humerus. Status post right-sided mastectomy.    LOWER NECK: Within normal limits.    MISCELLANEOUS: Near-complete opacification of the right maxillary sinus.   Mucosal thickening of the left maxillary sinus.    IMPRESSION:    The airways are patent without mucoid impaction.    Mild bilateral areas of subsegmental atelectasis. The lungs are otherwise   clear.    Diffuse lytic bone lesions are again noted. Mild-moderate loss of body   height of T8 vertebral body is new from prior exam.    ADE CAMACHO MD; Resident Radiologist  This document has been electronically signed.  GIANNI MANTILLA MD; Attending Radiologist  This document has been electronically signed. Sep 29 2022 10:50AM  ---------------------------------------------------------------------------------------------------------------    EXAM:  XR CHEST PORTABLE URGENT 1V                          PROCEDURE DATE:  09/19/2022      FINDINGS:  Radiographic interpretation is limited by patient positioning and   overlying artifact.  Gastrostomy tube is noted.  Left humeral fixation plate and screws are noted.  The heart size cannot be accurately assessed in this projection.  Left basilar hazy opacities  Low lung volumes bilaterally.  There is no pneumothorax or large pleural effusion.  No acute bony abnormality.    IMPRESSION:  Left basilar hazy opacity may represent atelectasis.    AMY JARAMILLO MD; Resident Radiologist  This document has been electronically signed.  DIANE CLARK MD; Attending Radiologist  This document has been electronically signed. Sep 19 2022  6:08PM  --------------------------------------------------------------------------------------------------------------  EXAM:  CT CHEST                          PROCEDURE DATE:  09/14/2022      FINDINGS:    LUNGS AND AIRWAYS: Patent central airways.  Subsegmental atelectasis is   seen in the bilateral upper and lower lobes.  PLEURA: No pleural effusion.  MEDIASTINUM AND GIOVANA: No lymphadenopathy.  VESSELS: Calcifications of the aorta, aortic valve and coronary arteries.  HEART: Heart size is normal. No pericardial effusion.  CHEST WALL AND LOWER NECK: Within normal limits.  VISUALIZED UPPER ABDOMEN: Cholelithiasis.  BONES: Multiple lytic bone lesions are again seen including a destructive   lesion at the level of the T5 vertebral body that may extend into the   left neural foramen (3:42), unchanged from prior CT chest.    IMPRESSION:  Subsegmental atelectasis in the bilateral upper and lower lobes.    Lytic bone lesions as described above, unchanged from prior CT chest   8/29/2022. Recommend MR thoracic spine for further evaluation.     ALEXANDRIA COLLINS MD; Resident Radiologist  This document has been electronically signed.  HAY IRVIN MD; Attending Radiologist  This document has been electronically signed. Sep 14 2022  3:23PM  ---------------------------------------------------------------------------------------------------------------  EXAM:  DUPLEX SCAN EXT VEINS LOWER BI                          PROCEDURE DATE:  09/10/2022      IMPRESSION:  Limited study  No evidence of deep venous thrombosis in either lower extremity.  Limited visualization of the calf veins.    KEYLA ROMERO MD; Attending Radiologist  This document has been electronically signed. Sep 10 2022 10:42AM  ---------------------------------------------------------------------------------------------------------------  EXAM:  CT ABDOMEN AND PELVIS IC                          PROCEDURE DATE:  08/31/2022      IMPRESSION:    Multiple lytic lesions are seen throughout the axial and appendicular   skeleton, some of which appear increased in size from CT abdomen pelvis   3/16/2022 when evaluated in retrospect. A few lytic lesions involve the   left intertrochanteric region and right acetabulum.    Endometrial thickening. Recommend dedicated pelvic sonogram.    Cystic lesions are seen within the pancreas. Recommend a dedicated MRI on   a nonemergent basis.    SULY KENDRICK MD; Resident Radiology  This document has been electronically signed.  BRITTANI MALCOLM MD; Attending Radiologist  This document has been electronically signed. Sep  1 2022 11:01AM  ---------------------------------------------------------------------------------------------------------------  EXAM:  XR HUMERUS MIN 2 VIEWS RT                          PROCEDURE DATE:  08/30/2022      EXAM:  Internal/external rotation AP right humerus from 8/30/2022 at 0927.   Compared to appearance on previous day chest CT.    IMPRESSION:  Generalized osteopenia. Redemonstrated focal lytic lesion in proximal   medial humeral diaphysis with small area of permeative cortical   destruction. No additional radiographically appreciated suspicious lytic   or blastic lesions in remaining imaged regions.    No current fractures or dislocations.    Preserved shoulder and elbow joint spaces.    IV cannula with attached tubing overlies upper arm.    MINE NICE MD; Attending Radiologist  This document has been electronically signed. Aug 30 2022 10:39AM  ---------------------------------------------------------------------------------------------------------------  EXAM:  DUPLEX EXT VEINS UPPER LT                          PROCEDURE DATE:  09/01/2022      IMPRESSION:  No evidence of left upper extremity deep venous thrombosis.    FAWN FITZGERALD MD; Attending Radiologist  This document has been electronically signed. Sep  1 2022  1:10PM  ---------------------------------------------------------------------------------------------------------------

## 2022-10-04 NOTE — PROGRESS NOTE ADULT - ASSESSMENT
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 She also has a history of HTN, HLD, CAD s/p MI with preserved LVEF and moderate aortic stenosis. The patient was admitted to [place] in December 2021 with fever and abdominal pain due to perforated appendicitis. She was treated with tube drainage and antibiotics for a polymicrobial infection. Hospital course was complicated by respiratory failure due to mucous plugging with complete collapse of the left lung. She underwent several bronchoscopies for pulmonary toilet and was found to have severe tracheobronchomalacia. Her respiratory status has remained "stable" while at [facility] on nebulized bronchodilators and hypertonic saline and without nocturnal NIV. She was admitted in March with hematochezia. The left lower lobe was well aerated on a CT scan of the abdomen and pelvis with only bibasilar subsegmental atelectasis - there was scattered sigmoid diverticulosis without evidence of diverticulitis - interval decrease in size of a fluid collection in the region of the previously perforated appendicitis measuring 2.8 x 1.5 cm previously measuring 4.0 x 2.2 cm - slight increase in mild adjacent inflammatory change. The GI bleed was treated conservatively.  She was admitted on 8/29 with shortness of breath in the setting of back, neck and left lower extremity pain. She required NIV for mild acute hypercapnic respiratory failure that quickly resolved. Her respiratory status remained stable on room air and on her usual nebs and mucolytics. She was started on a puree diet with moderately thickened fluids in addition to PEG feeds following evaluation by the speech and swallow team. She was started on anastrazole for likely metastatic breast cancer to the bone. On the day of discharge, the patient was quite anxious with shortness of breath, chest congestion and wheeze - she was making a grunting noise with expiration. She was returned from [facility] that evening. Currently she is tachypneic and using accessory muscles of respiration. She has an audible wheeze and cough productive of scant sputum. No fevers, chills or sweats. No chest pain/pressure or palpitations.     the patient has new onset bronchospasm with increased work of breathing and worsening left lower lobe atelectasis -  she has severe tracheobronchomalacia that has resulted in mucous plugging with complete left lung collapse requiring several bronchoscopies for pulmonary toilet in the past - the patient was felt not to be a candidate for surgery for the tracheobronchomalacia and stenting was felt to have more risk than benefit     metastatic breast cancer    9/19 -  found to be COVID positive on discharge [facility]; awake and alert; now with marked worsening of shortness of breath and using her accessory muscles of respiration; severe chest congestion and wheeze exacerbated by neck flexion; increasing pCO2, WBC and lactate; occasional cough productive of scant sputum; no fevers, chills or sweats; no chest pain/pressure or palpitations; back on PEG feeds; switched from nebs to inhalers yesterday as Dr. Dexter was informed by nursing that COVID positive patients cannot receive nebs at [facility]    9/21 - spent the day on AVAPS -> removed this AM with somewhat less increased work of breathing and decreased chest congestion and wheeze; VBG not sent this AM; in airborne and contact isolation due to hospital acquired COVID infection; awake and alert; occasional cough productive of scant sputum; no fevers, chills or sweats; no chest pain/pressure or palpitations;     9/23 - off AVAPS this AM - laying almost flat in bed with neck and back flexed; increased work of breathing; in airborne and contact isolation due to hospital acquired COVID infection; awake and alert; begging for lemonade; occasional cough productive of scant sputum; improving chest congestion or wheeze; no fevers, chills or sweats; no chest pain/pressure or palpitations; receiving PEG feeds when off NIV;    9/26 -  again laying almost flat in bed with neck and back flexed; decreased work of breathing now on a nasal canula during the day and AVAPS at night; airborne and contact isolation being discontinued after treatment for a hospital acquired COVID infection; awake and alert asking for something to eat and drink; occasional cough productive of scant sputum; improving chest congestion and wheeze; no fevers, chills or sweats; no chest pain/pressure or palpitations; receiving PEG feeds    9/30 - hoisted up in bed earlier this AM - neck flexed onto the anterior chest wall; increased tachypnea without hypoxemia on a 3lpm nasal canula today; no cough, sputum production, hemoptysis, chest congestion or wheeze; no fevers, chills or sweats; no chest pain/pressure or palpitations; tolerating ICE chips.     10/4 - speaking in full sentences; wants to go back to [facility]; flat in bed; neck flexed onto the anterior chest wall; looks enormously uncomfortable with tachypnea and increased work of breathing; no cough, sputum production, hemoptysis, chest congestion or wheeze; no fevers, chills or sweats; no chest pain/pressure or palpitations; asking for ICE chips.       PLAN/RECOMMENDATIONS:    the patient's head needs to be elevated greater than 45 degrees at all times - unfortunately her neck is flexed downward and rests on her anterior chest obstructing her upper airway - she continues to slide down in bed limiting her diaphragmatic excursion  repeat chest CT -> patent central airways - stable subsegmental atelectasis within posterior upper lobes, lingula and basilar lower lobes - trace fluid within the left fissure - no significant pleural effusion or pneumothorax - new T8 compression deformity - redemonstration of T2 and T3 compression fractures - unchanged mild T11 wedge deformity - redemonstration of multiple lytic lesions in the thoracic spine - fixation plate and screws within the left proximal humerus - status post right-sided mastectomy  oxygen supplementation via a nasal canula during the day to keep saturation greater than 92% - currently on a 3lpm nasal canula   continue nocturnal BIPAP to prevent atelectasis   daytime BIPAP as needed for increased work of breathing, resistant hypoxemia or respiratory acidosis  VBG 9/27 -> 7.42/45/61/90.4% - resolved respiratory acidosis -> repeat  has completed a 5 day course of remdesivir  prednisolone 45mg via PEG - taper as written - will need to adjust insulin accordingly  continue albuterol/atrovent nebs q6h  continue pulmicort 0.5mg nebs q12h  continue guaifenesin 300mg 4 times daily via PEG  incentive spirometry   PEG feeds - see recommendations by nutrition service  CT scan 8/31 -> multiple lytic lesions are seen throughout the axial and appendicular skeleton, some of which appear increased in size from CT abdomen pelvis 3/16/2022 when evaluated in retrospect - a few lytic lesions involve the left intertrochanteric region and right acetabulum  oncology follow-up noted    s/p right simple mastectomy 4/2019 for breast cancer but did not receive adjuvant hormonal therapy    it is likely that the patient has metastatic breast cancer    started on anastrozole as the patient could not tolerate a MRI and is not a candidate for bone biopsy  cardiology follow-up for management of HTN, HLD, CAD, aortic stenosis and atrial fibrillation     cardiac meds: digoxin/diltiazem/xarelto  DVT prophylaxis - xarelto  glucose control  bowel regimen  analgesics  keppra    Will follow with you. Plan of care discussed with the patient at bedside and the dedicated floor NP. The patient's respiratory status remains tenuous and she will unlikely be able to be discharged back to [facility] - her children are very unrealistic - palliative care evaluation    Kennedy Marcano MD, [facility]  [phone]  Pulmonary Medicine

## 2022-10-04 NOTE — PROGRESS NOTE ADULT - SUBJECTIVE AND OBJECTIVE BOX
CARDIOLOGY     PROGRESS  NOTE   ________________________________________________    CHIEF COMPLAINT:Patient is a 87y old  Female who presents with a chief complaint of SOB (04 Oct 2022 06:15)  no complain  	  REVIEW OF SYSTEMS:  CONSTITUTIONAL: No fever, weight loss, or fatigue  EYES: No eye pain, visual disturbances, or discharge  ENT:  No difficulty hearing, tinnitus, vertigo; No sinus or throat pain  NECK: No pain or stiffness  RESPIRATORY: No cough, wheezing, chills or hemoptysis; + Shortness of Breath  CARDIOVASCULAR: No chest pain, palpitations, passing out, dizziness, or leg swelling  GASTROINTESTINAL: No abdominal or epigastric pain. No nausea, vomiting, or hematemesis; No diarrhea or constipation. No melena or hematochezia.  GENITOURINARY: No dysuria, frequency, hematuria, or incontinence  NEUROLOGICAL: No headaches, memory loss, loss of strength, numbness, or tremors  SKIN: No itching, burning, rashes, or lesions   LYMPH Nodes: No enlarged glands  ENDOCRINE: No heat or cold intolerance; No hair loss  MUSCULOSKELETAL: No joint pain or swelling; No muscle, back, or extremity pain  PSYCHIATRIC: No depression, anxiety, mood swings, or difficulty sleeping  HEME/LYMPH: No easy bruising, or bleeding gums  ALLERGY AND IMMUNOLOGIC: No hives or eczema	    [ ] All others negative	  [x ] Unable to obtain    PHYSICAL EXAM:  T(C): 37.2 (10-04-22 @ 04:52), Max: 37.2 (10-03-22 @ 20:35)  HR: 79 (10-04-22 @ 04:52) (76 - 98)  BP: 106/64 (10-04-22 @ 04:52) (96/60 - 107/77)  RR: 20 (10-04-22 @ 04:52) (20 - 21)  SpO2: 96% (10-04-22 @ 04:52) (92% - 100%)  Wt(kg): --  I&O's Summary    03 Oct 2022 07:01  -  04 Oct 2022 07:00  --------------------------------------------------------  IN: 0 mL / OUT: 100 mL / NET: -100 mL        Appearance: Normal	  HEENT:   Normal oral mucosa, PERRL, EOMI	  Lymphatic: No lymphadenopathy  Cardiovascular: Normal S1 S2, No JVD, + murmurs, No edema  Respiratory: rhonchi  Psychiatry: anxious  Gastrointestinal:  Soft, Non-tender, + BS	  Skin: No rashes, No ecchymoses, No cyanosis	  Neurologic: Non-focal  Extremities: Normal range of motion, No clubbing, cyanosis or edema  Vascular: Peripheral pulses palpable 2+ bilaterally    MEDICATIONS  (STANDING):  albuterol/ipratropium for Nebulization 3 milliLiter(s) Nebulizer every 6 hours  allopurinol 100 milliGRAM(s) Oral daily  anastrozole 1 milliGRAM(s) Oral daily  atorvastatin 20 milliGRAM(s) Oral at bedtime  bisacodyl 5 milliGRAM(s) Oral at bedtime  buDESOnide    Inhalation Suspension 0.5 milliGRAM(s) Inhalation every 12 hours  chlorhexidine 2% Cloths 1 Application(s) Topical <User Schedule>  dextrose 5%. 1000 milliLiter(s) (50 mL/Hr) IV Continuous <Continuous>  dextrose 50% Injectable 25 Gram(s) IV Push once  dextrose 50% Injectable 25 Gram(s) IV Push once  dextrose 50% Injectable 12.5 Gram(s) IV Push once  dextrose 50% Injectable 25 Gram(s) IV Push once  dextrose Oral Gel 15 Gram(s) Oral once  digoxin     Tablet 125 MICROGram(s) Oral daily  diltiazem    Tablet 30 milliGRAM(s) Enteral Tube every 6 hours  glucagon  Injectable 1 milliGRAM(s) IntraMuscular once  guaiFENesin Oral Liquid (Sugar-Free) 300 milliGRAM(s) Oral every 6 hours  insulin lispro (ADMELOG) corrective regimen sliding scale   SubCutaneous every 6 hours  insulin NPH human recombinant 10 Unit(s) SubCutaneous every 6 hours  levETIRAcetam  Solution 750 milliGRAM(s) Oral two times a day  levothyroxine 75 MICROGram(s) Oral daily  melatonin 3 milliGRAM(s) Oral at bedtime  prednisoLONE    3 mG/mL Solution (ORAPRED) 45 milliGRAM(s) Oral daily  prednisoLONE    3 mG/mL Solution (ORAPRED)   Oral   rivaroxaban 20 milliGRAM(s) Oral with dinner  senna 2 Tablet(s) Oral at bedtime      TELEMETRY: 	    ECG:  	  RADIOLOGY:  OTHER: 	  	  LABS:	 	    CARDIAC MARKERS:                                See note  See Note )-----------( See note    ( 03 Oct 2022 11:22 )             See note    10-03    139  |  103  |  37<H>  ----------------------------<  191<H>  4.6   |  24  |  0.76    Ca    8.8      03 Oct 2022 11:22      proBNP: Serum Pro-Brain Natriuretic Peptide: 461 pg/mL (09-08 @ 20:56)    Lipid Profile:   HgA1c:   TSH: Thyroid Stimulating Hormone, Serum: 0.32 uIU/mL (09-24 @ 11:34)  Thyroid Stimulating Hormone, Serum: 0.30 uIU/mL (09-24 @ 11:34)    Notes: Chart reviewed. Pt remains acute, as per team pt to be cleared by  pulmonary prior to discharge, plan for pt to return to [facility]  when medically cleared for discharge. Case management will continue to follow.      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 Very limited hx obtained from pt given mental status, dyspnea, and use of BIPAP. Unable to reach sonSy. History obtained from staff/chart review  Of note, pt has multiple recent admissions with similar presentation. Most recently was hospitalized from 8/28/22-9/8/22 for SOB, treated with airway clearance and completed 5-day course of Zosyn for empiric coverage of PNA (though per Pulm, unlikely to have PNA). Course c/b PEG displacement and subsequent reinsertion by IR on 8/30. In addition to her usual PEG feeds, pt was also started on puree diet with moderately thickened fluids for pleasure feeds. Also was started on anastrozole for most likely dx of metastatic breast cancer to bones (pt was unable to tolerate further cancer workup of spine lesions). Pt remained full code during recent admissions.  pt with metastatic ca to the bone with sob sec to obesity and underlying lung and cardiac disease  increase sob sec to missing BiPAP at night  may consider hospice care/ palliative care  pt with bone mets, no further nieto/ onc noted  on steroid as per pulmonary  a.fib on 09/23/increase Cardizem dose, will add digoxin  may increase Xarelto dose sec to a.fib  add digoxin/converted to NSR  change dig to po  remained in nsr, dc tele pt non compliant   will increase Cardizem dose as tolerated  palliative care  pulmonary fu, sterid taper  dc planning as clear by pulmonary

## 2022-10-04 NOTE — CONSULT NOTE ADULT - PROVIDER SPECIALTY LIST ADULT
MICU
Pulmonology
Heme/Onc
Infectious Disease
ENT
Cardiology
Endocrinology
Intervent Radiology
Palliative Care

## 2022-10-04 NOTE — CONSULT NOTE ADULT - PROBLEM SELECTOR RECOMMENDATION 5
- Geriatrics and Palliative Medicine Team awaiting family to call back, I have reached out to both mary Kp and Sy.     An MD Andrea  GAP Team Consults  Please call if we can be of assistance, [phone]
Will continue statin, primary team FU.

## 2022-10-04 NOTE — CONSULT NOTE ADULT - CONSULT REASON
chronic hypoxic/hypercapnic respiratory failure; mucous plugging; atelectasis; tracheobronchomalacia; bronchospasm; weak cough; 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; PEG in place
COVID
breast cancer
Upper Airway Evaluation
PICC line placement
Hypoxia
sob
GOC
High Blood Sugars/DMT2

## 2022-10-04 NOTE — CONSULT NOTE ADULT - PROBLEM/RECOMMENDATION-2
IUI (Intrauterine Insemination) Procedure Note    SUBJECTIVE:  The patient presents today for intrauterine insemination as part of her Letrozole IUI cycle.    DESCRIPTION OF PROCEDURE:  The specimen was identified by patient and health care providers per protocol.     A sterile speculum was placed into vagina and the cervix was visualized and wiped clean with a sterile swab. The insemination catheter was passed through the cervical os into the endometrial cavity where 36 million motile sperm were injected. The catheter and speculum were removed and the procedure was concluded. The patient tolerated the procedure well and remained in the recumbent position for 10 minutes post insemination.     PLAN:  The patient left our office in stable condition and will return for pregnancy testing per protocol.        
DISPLAY PLAN FREE TEXT
DISPLAY PLAN FREE TEXT

## 2022-10-04 NOTE — CONSULT NOTE ADULT - PROBLEM SELECTOR RECOMMENDATION 4
Suggest to continue medications, monitoring, FU primary team recommendations.
- HCP: unclear if pt's established a document in the past, in conversation she defers medical decision making to her eldest son Kp Newell, [phone] or [phone]  - Code status Full code as previously discussed by other providers, will discuss once I'm able to reach family  - GOC to be explored once I am able to reach the family

## 2022-10-04 NOTE — PROGRESS NOTE ADULT - ASSESSMENT
Assessment  DMT2: 87y Female with DM T2 with hyperglycemia, A1C 7%, on NPH insulin and coverage, NPH dose held 2/2 blood sugars trending down, had hypoglycemic episodes, on peg tube feeds, tachypnea requiring NIV, remains on prednisolone.  Covid: on medications, stable, monitored.  Hypothyroidism: On Synthroid 75 mcg po daily, ?compliance, euthyroid.  HLD: on statin.        Jeanie Gao MD  Cell: [phone]  Office: [phone]               Assessment  DMT2: 87y Female with DM T2 with hyperglycemia, A1C 7%, on NPH insulin and coverage, NPH dose held 2/2 blood sugars trending down, had hypoglycemic episodes, on peg tube feeds, tachypnea requiring NIV,  remains on prednisolone.  Covid: on medications, stable, monitored.  Hypothyroidism: On Synthroid 75 mcg po daily, ?compliance, euthyroid.  HLD: on statin.        Jeanie Gao MD  Cell: [phone]  Office: [phone]

## 2022-10-05 NOTE — PROGRESS NOTE ADULT - CONVERSATION DETAILS
Call received from son Kp this morning. Discussed pt's illness course over the recent months. Discussed the reasons for why pt's respiratory status is tenuous and that she has poor reserve to recover from any insults. Advised that pt is likely facing recurrent complications with worsening quality of life. Kp states pt had previously wanted to remain full code, however, seeing her quality of life in the recent past, he's not sure if that's the right decision. He has spoken with Dr. Marcano and understands that pt is facing a limited prognosis. Risks and chances of CPR/intubation discussed today and I asked for consideration of DNR/DNI. We also spoke about hospice services where the focus of care would be on comfort rather than aggressive medical interventions/repeated hospitalizations. I suggested hospice may be considered to better preserve pt's quality of life in setting of a limited prognosis.     Kp wishes to speak privately with his mother and 2 other siblings about everything we discussed today. He hopes that pt will transfer back to [facility] soon so they can have this discussion in a place where pt is most comfortable. He states himself and his brother Sy were appointed as pt's HCP. They make decisions together as a family. I informed Kp that if pt remains hospitalized and family wishes to have this discussion in the hospital, I can be available to support the discussion. Kp expressed understanding and was appreciative of the call.     Case discussed with Dr. Segundo with update of my conversation. Defer discharge planning to primary team. Geriatrics and Palliative Medicine Team remain available for a family GOC discussion in the hospital upon family request.

## 2022-10-05 NOTE — PROVIDER CONTACT NOTE (OTHER) - ASSESSMENT
Pt AO3, Pt tachpyneic, increased work of breathing, distended abdomen, pt contracted, on PEG feeds at 70/hr with sliding scale and NPH insulin. BP soft. Cardizem held
bp is comfortable on bipap o2 sat 99%, took patient off to assess need for bipap; pt back at baseline still tachypnic o2 sat 95% on 3L nasal canula
A&O3-4. Denies chest pain or palpitations. Respirations  labored 22 pulses ox 98% on nasal cannula 3L. appears anxious.
Pt AAOx4. VSS except low bp; Pt denies CP, SOB, no acute events noted on tele
AAOx4. VSS except low bp and increase in RR; pt denies CP, SOB, no acute events noted on tele.
patient VSS, patient oxygen on tele remains WDL.
Pt AO4, on 3L NC tachypneic, belly breathing, diminished lung sounds, especially Left side, abdomen distended, hypoactive bowel sounds, PEG running at goal restarted at 6am, pt complains of generalized discomfort. Feed held. given duoneb. MEWS score.
patient's RR is 38, oxygen saturation is 95%
pt pending blood transfusion and lasix 20IVP, no consent, type and screen just sent  Pt AO3, forgetful regarding time/date. BP soft pt alert
pt a&Ox3, anxious, IV nurses unable to place midline or IV

## 2022-10-05 NOTE — PROVIDER CONTACT NOTE (OTHER) - NAME OF MD/NP/PA/DO NOTIFIED:
Amada Camp
CHAITANYA/PA
Anthony Segura ACP
DANILO Story
Daniel, ACP
Unit leadership and bed board.
Maame CARRASCO
randy METCALF
Anthony CARRASCO
Anthony Segura, ACP
Fátima Oliva
DANILO Barlow

## 2022-10-05 NOTE — PROGRESS NOTE ADULT - ASSESSMENT
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 She also has a history of HTN, HLD, CAD s/p MI with preserved LVEF and moderate aortic stenosis. The patient was admitted to [facility] in December 2021 with fever and abdominal pain due to perforated appendicitis. She was treated with tube drainage and antibiotics for a polymicrobial infection. Hospital course was complicated by respiratory failure due to mucous plugging with complete collapse of the left lung. She underwent several bronchoscopies for pulmonary toilet and was found to have severe tracheobronchomalacia. Her respiratory status has remained "stable" while at [facility] on nebulized bronchodilators and hypertonic saline and without nocturnal NIV. She was admitted in March with hematochezia. The left lower lobe was well aerated on a CT scan of the abdomen and pelvis with only bibasilar subsegmental atelectasis - there was scattered sigmoid diverticulosis without evidence of diverticulitis - interval decrease in size of a fluid collection in the region of the previously perforated appendicitis measuring 2.8 x 1.5 cm previously measuring 4.0 x 2.2 cm - slight increase in mild adjacent inflammatory change. The GI bleed was treated conservatively.  She was admitted on 8/29 with shortness of breath in the setting of back, neck and left lower extremity pain. She required NIV for mild acute hypercapnic respiratory failure that quickly resolved. Her respiratory status remained stable on room air and on her usual nebs and mucolytics. She was started on a puree diet with moderately thickened fluids in addition to PEG feeds following evaluation by the speech and swallow team. She was started on anastrazole for likely metastatic breast cancer to the bone. On the day of discharge, the patient was quite anxious with shortness of breath, chest congestion and wheeze - she was making a grunting noise with expiration. She was returned from [facility] that evening. Currently she is tachypneic and using accessory muscles of respiration. She has an audible wheeze and cough productive of scant sputum. No fevers, chills or sweats. No chest pain/pressure or palpitations.     the patient has new onset bronchospasm with increased work of breathing and worsening left lower lobe atelectasis -  she has severe tracheobronchomalacia that has resulted in mucous plugging with complete left lung collapse requiring several bronchoscopies for pulmonary toilet in the past - the patient was felt not to be a candidate for surgery for the tracheobronchomalacia and stenting was felt to have more risk than benefit     metastatic breast cancer    9/19 -  found to be COVID positive on discharge [facility]; awake and alert; now with marked worsening of shortness of breath and using her accessory muscles of respiration; severe chest congestion and wheeze exacerbated by neck flexion; increasing pCO2, WBC and lactate; occasional cough productive of scant sputum; no fevers, chills or sweats; no chest pain/pressure or palpitations; back on PEG feeds; switched from nebs to inhalers yesterday as Dr. Dexter was informed by nursing that COVID positive patients cannot receive nebs at [facility]    9/21 - spent the day on AVAPS -> removed this AM with somewhat less increased work of breathing and decreased chest congestion and wheeze; VBG not sent this AM; in airborne and contact isolation due to hospital acquired COVID infection; awake and alert; occasional cough productive of scant sputum; no fevers, chills or sweats; no chest pain/pressure or palpitations;     9/23 - off AVAPS this AM - laying almost flat in bed with neck and back flexed; increased work of breathing; in airborne and contact isolation due to hospital acquired COVID infection; awake and alert; begging for lemonade; occasional cough productive of scant sputum; improving chest congestion or wheeze; no fevers, chills or sweats; no chest pain/pressure or palpitations; receiving PEG feeds when off NIV;    9/26 -  again laying almost flat in bed with neck and back flexed; decreased work of breathing now on a nasal canula during the day and AVAPS at night; airborne and contact isolation being discontinued after treatment for a hospital acquired COVID infection; awake and alert asking for something to eat and drink; occasional cough productive of scant sputum; improving chest congestion and wheeze; no fevers, chills or sweats; no chest pain/pressure or palpitations; receiving PEG feeds    9/30 - hoisted up in bed earlier this AM - neck flexed onto the anterior chest wall; increased tachypnea without hypoxemia on a 3lpm nasal canula today; no cough, sputum production, hemoptysis, chest congestion or wheeze; no fevers, chills or sweats; no chest pain/pressure or palpitations; tolerating ICE chips.     10/4 - speaking in full sentences; wants to go back to [facility]; flat in bed; neck flexed onto the anterior chest wall; looks enormously uncomfortable with tachypnea and increased work of breathing; no cough, sputum production, hemoptysis, chest congestion or wheeze; no fevers, chills or sweats; no chest pain/pressure or palpitations; asking for ICE chips.       PLAN/RECOMMENDATIONS:    the patient's head needs to be elevated greater than 45 degrees at all times - unfortunately her neck is flexed downward and rests on her anterior chest obstructing her upper airway - she continues to slide down in bed limiting her diaphragmatic excursion  repeat chest CT -> patent central airways - stable subsegmental atelectasis within posterior upper lobes, lingula and basilar lower lobes - trace fluid within the left fissure - no significant pleural effusion or pneumothorax - new T8 compression deformity - redemonstration of T2 and T3 compression fractures - unchanged mild T11 wedge deformity - redemonstration of multiple lytic lesions in the thoracic spine - fixation plate and screws within the left proximal humerus - status post right-sided mastectomy  oxygen supplementation via a nasal canula during the day to keep saturation greater than 92% as able  currently on BIPAP due to increased work of breathing  VBG 9/27 -> 7.42/45/61/90.4% - resolved respiratory acidosis -> repeat  has completed a 5 day course of remdesivir  prednisolone 45mg via PEG - taper as written - will need to adjust insulin accordingly  continue albuterol/atrovent nebs q6h  continue pulmicort 0.5mg nebs q12h  continue guaifenesin 300mg 4 times daily via PEG  incentive spirometry   PEG feeds - see recommendations by nutrition service  CT scan 8/31 -> multiple lytic lesions are seen throughout the axial and appendicular skeleton, some of which appear increased in size from CT abdomen pelvis 3/16/2022 when evaluated in retrospect - a few lytic lesions involve the left intertrochanteric region and right acetabulum  oncology follow-up noted    s/p right simple mastectomy 4/2019 for breast cancer but did not receive adjuvant hormonal therapy    it is likely that the patient has metastatic breast cancer    started on anastrozole as the patient could not tolerate a MRI and is not a candidate for bone biopsy  cardiology follow-up for management of HTN, HLD, CAD, aortic stenosis and atrial fibrillation     cardiac meds: digoxin/diltiazem/xarelto  DVT prophylaxis - xarelto  glucose control  bowel regimen  analgesics  keppra    Will follow with you. Plan of care discussed with the patient at bedside, the dedicated floor NP and the patient's son Sy Galloway by phone. The patient's respiratory status remains tenuous and she will unlikely be able to be discharged back to [facility] - her children are very unrealistic - palliative care evaluation noted    Kennedy Marcano MD, [facility]  [phone]  Pulmonary Medicine

## 2022-10-05 NOTE — PROVIDER CONTACT NOTE (OTHER) - RECOMMENDATIONS
notify provider
Notify ACP, hold NPH, hold feeds, replace BIPAP, hold cardizem
notify provider
Notify ACP
Notify provider. Hold Insulin ?
Notify ACP, await T&S and consent to be obtained, lasix to be given after blood transfusion
Notify ACP, hold feed, avaps
Susy  neb. encouraged to take deep slow breaths.
Notify provider.

## 2022-10-05 NOTE — PROGRESS NOTE ADULT - SUBJECTIVE AND OBJECTIVE BOX
CARDIOLOGY     PROGRESS  NOTE   ________________________________________________    CHIEF COMPLAINT:Patient is a 87y old  Female who presents with a chief complaint of SOB (05 Oct 2022 06:48)  still with sob  	  REVIEW OF SYSTEMS:  CONSTITUTIONAL: No fever, weight loss, or fatigue  EYES: No eye pain, visual disturbances, or discharge  ENT:  No difficulty hearing, tinnitus, vertigo; No sinus or throat pain  NECK: No pain or stiffness  RESPIRATORY: No cough, wheezing, chills or hemoptysis; =Shortness of Breath  CARDIOVASCULAR: No chest pain, palpitations, passing out, dizziness, or leg swelling  GASTROINTESTINAL: No abdominal or epigastric pain. No nausea, vomiting, or hematemesis; No diarrhea or constipation. No melena or hematochezia.  GENITOURINARY: No dysuria, frequency, hematuria, or incontinence  NEUROLOGICAL: No headaches, memory loss, loss of strength, numbness, or tremors  SKIN: No itching, burning, rashes, or lesions   LYMPH Nodes: No enlarged glands  ENDOCRINE: No heat or cold intolerance; No hair loss  MUSCULOSKELETAL: No joint pain or swelling; No muscle, back, or extremity pain  PSYCHIATRIC: No depression, anxiety, mood swings, or difficulty sleeping  HEME/LYMPH: No easy bruising, or bleeding gums  ALLERGY AND IMMUNOLOGIC: No hives or eczema	    [ ] All others negative	  [x ] Unable to obtain    PHYSICAL EXAM:  T(C): 36.3 (10-05-22 @ 05:10), Max: 37.2 (10-04-22 @ 11:20)  HR: 68 (10-05-22 @ 05:10) (66 - 86)  BP: 94/84 (10-05-22 @ 05:10) (89/55 - 128/68)  RR: 18 (10-05-22 @ 05:10) (18 - 24)  SpO2: 94% (10-05-22 @ 05:10) (94% - 100%)  Wt(kg): --  I&O's Summary    04 Oct 2022 07:01  -  05 Oct 2022 07:00  --------------------------------------------------------  IN: 0 mL / OUT: 500 mL / NET: -500 mL        Appearance: Normal	  HEENT:   Normal oral mucosa, PERRL, EOMI	  Lymphatic: No lymphadenopathy  Cardiovascular: Normal S1 S2, No JVD, +murmurs, No edema  Respiratory: rhonchi  Gastrointestinal:  Soft, Non-tender, + BS	  Skin: No rashes, No ecchymoses, No cyanosis	  Neurologic: Non-focal  Extremities: Normal range of motion, No clubbing, cyanosis or edema  Vascular: Peripheral pulses palpable 2+ bilaterally    MEDICATIONS  (STANDING):  albuterol/ipratropium for Nebulization 3 milliLiter(s) Nebulizer every 6 hours  allopurinol 100 milliGRAM(s) Oral daily  anastrozole 1 milliGRAM(s) Oral daily  atorvastatin 20 milliGRAM(s) Oral at bedtime  bisacodyl 5 milliGRAM(s) Oral at bedtime  buDESOnide    Inhalation Suspension 0.5 milliGRAM(s) Inhalation every 12 hours  chlorhexidine 2% Cloths 1 Application(s) Topical <User Schedule>  dextrose 50% Injectable 25 Gram(s) IV Push once  dextrose 50% Injectable 25 Gram(s) IV Push once  dextrose 50% Injectable 12.5 Gram(s) IV Push once  dextrose 50% Injectable 25 Gram(s) IV Push once  dextrose Oral Gel 15 Gram(s) Oral once  digoxin     Tablet 125 MICROGram(s) Oral daily  diltiazem    Tablet 30 milliGRAM(s) Enteral Tube every 6 hours  furosemide   Injectable 20 milliGRAM(s) IV Push once  glucagon  Injectable 1 milliGRAM(s) IntraMuscular once  guaiFENesin Oral Liquid (Sugar-Free) 300 milliGRAM(s) Oral every 6 hours  insulin lispro (ADMELOG) corrective regimen sliding scale   SubCutaneous every 6 hours  insulin NPH human recombinant 7 Unit(s) SubCutaneous every 6 hours  levETIRAcetam  Solution 750 milliGRAM(s) Oral two times a day  levothyroxine 75 MICROGram(s) Oral daily  melatonin 3 milliGRAM(s) Oral at bedtime  prednisoLONE    3 mG/mL Solution (ORAPRED) 45 milliGRAM(s) Oral daily  prednisoLONE    3 mG/mL Solution (ORAPRED)   Oral   rivaroxaban 20 milliGRAM(s) Oral with dinner  senna 2 Tablet(s) Oral at bedtime      TELEMETRY: 	    ECG:  	  RADIOLOGY:  OTHER: 	  	  LABS:	 	    CARDIAC MARKERS:                                7.3    7.15  )-----------( 136      ( 05 Oct 2022 06:14 )             25.2     10-05    135  |  99  |  26<H>  ----------------------------<  135<H>  4.5   |  24  |  0.64    Ca    8.7      05 Oct 2022 06:14      proBNP: Serum Pro-Brain Natriuretic Peptide: 461 pg/mL (09-08 @ 20:56)    Lipid Profile:   HgA1c:   TSH: Thyroid Stimulating Hormone, Serum: 0.32 uIU/mL (09-24 @ 11:34)  Thyroid Stimulating Hormone, Serum: 0.30 uIU/mL (09-24 @ 11:34)          Assessment and plan  ---------------------------  86F w/ extensive hx including severe tracheobronchomalacia (c/b hypoxia 2/2 mucous plugging and recurrent L lung collapse), 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 Very limited hx obtained from pt given mental status, dyspnea, and use of BIPAP. Unable to reach sonSy. History obtained from staff/chart review  Of note, pt has multiple recent admissions with similar presentation. Most recently was hospitalized from 8/28/22-9/8/22 for SOB, treated with airway clearance and completed 5-day course of Zosyn for empiric coverage of PNA (though per Pulm, unlikely to have PNA). Course c/b PEG displacement and subsequent reinsertion by IR on 8/30. In addition to her usual PEG feeds, pt was also started on puree diet with moderately thickened fluids for pleasure feeds. Also was started on anastrozole for most likely dx of metastatic breast cancer to bones (pt was unable to tolerate further cancer workup of spine lesions). Pt remained full code during recent admissions.  pt with metastatic ca to the bone with sob sec to obesity and underlying lung and cardiac disease  increase sob sec to missing BiPAP at night  may consider hospice care/ palliative care  pt with bone mets, no further nieto/ onc noted  on steroid as per pulmonary  a.fib on 09/23/increase Cardizem dose, will add digoxin  may increase Xarelto dose sec to a.fib  add digoxin/converted to NSR  change dig to po  remained in nsr, dc tele pt non compliant   will increase Cardizem dose as tolerated  palliative care  pulmonary fu, sterid taper  dc planning as clear by pulmonary  continue current meds

## 2022-10-05 NOTE — PROVIDER CONTACT NOTE (OTHER) - SITUATION
Patient diagnosed with Covid >10 days ago. Asymptomatic. No further isolation required.
Patient noticed to have increased RR with increased work of breathing.
c/o sob.
Pt due for Insulin, tube feeds being held pt on continuous avaps glucose 172
restarting tube feeds after taking off bipap from RRT
Pt tachpyneic, increased work of breathing with abdominal muscle use noted.
PT REFUSE FOR ABG TO BE DRAWN
MEWS notification
Patient complains of difficulty breathing
Pt is tachypneic, abdomen distended, complains of discomfort
pt pending blood transfusion and lasix 20IVP, no consent, type and screen just sent
unable to change IV or place midline, pt hard stick

## 2022-10-05 NOTE — PROGRESS NOTE ADULT - SUBJECTIVE AND OBJECTIVE BOX
afebriel  REVIEW OF SYSTEMS:  GEN: no fever,    no chills  RESP: no SOB,   no cough  CVS: no chest pain,   no palpitations  GI: no abdominal pain,   no nausea,   no vomiting,   no constipation,   no diarrhea  : no dysuria,   no frequency  NEURO: no headache,   no dizziness  PSYCH: no depression,   not anxious  Derm : no rash    MEDICATIONS  (STANDING):  albuterol/ipratropium for Nebulization 3 milliLiter(s) Nebulizer every 6 hours  allopurinol 100 milliGRAM(s) Oral daily  anastrozole 1 milliGRAM(s) Oral daily  atorvastatin 20 milliGRAM(s) Oral at bedtime  bisacodyl 5 milliGRAM(s) Oral at bedtime  buDESOnide    Inhalation Suspension 0.5 milliGRAM(s) Inhalation every 12 hours  chlorhexidine 2% Cloths 1 Application(s) Topical <User Schedule>  dextrose 5%. 1000 milliLiter(s) (50 mL/Hr) IV Continuous <Continuous>  dextrose 50% Injectable 25 Gram(s) IV Push once  dextrose 50% Injectable 25 Gram(s) IV Push once  dextrose 50% Injectable 12.5 Gram(s) IV Push once  dextrose 50% Injectable 25 Gram(s) IV Push once  dextrose Oral Gel 15 Gram(s) Oral once  digoxin     Tablet 125 MICROGram(s) Oral daily  diltiazem    Tablet 30 milliGRAM(s) Enteral Tube every 6 hours  glucagon  Injectable 1 milliGRAM(s) IntraMuscular once  guaiFENesin Oral Liquid (Sugar-Free) 300 milliGRAM(s) Oral every 6 hours  insulin lispro (ADMELOG) corrective regimen sliding scale   SubCutaneous every 6 hours  insulin NPH human recombinant 7 Unit(s) SubCutaneous every 6 hours  levETIRAcetam  Solution 750 milliGRAM(s) Oral two times a day  levothyroxine 75 MICROGram(s) Oral daily  melatonin 3 milliGRAM(s) Oral at bedtime  prednisoLONE    3 mG/mL Solution (ORAPRED) 45 milliGRAM(s) Oral daily  prednisoLONE    3 mG/mL Solution (ORAPRED)   Oral   rivaroxaban 20 milliGRAM(s) Oral with dinner  senna 2 Tablet(s) Oral at bedtime    MEDICATIONS  (PRN):  acetaminophen     Tablet .. 650 milliGRAM(s) Oral every 6 hours PRN Temp greater or equal to 38C (100.4F), Mild Pain (1 - 3)  ALPRAZolam 0.25 milliGRAM(s) Oral every 8 hours PRN anxiety  polyethylene glycol 3350 17 Gram(s) Oral daily PRN Constipation      Vital Signs Last 24 Hrs  T(C): 36.3 (05 Oct 2022 05:10), Max: 37.2 (04 Oct 2022 11:20)  T(F): 97.4 (05 Oct 2022 05:10), Max: 98.9 (04 Oct 2022 11:20)  HR: 68 (05 Oct 2022 05:10) (66 - 86)  BP: 94/84 (05 Oct 2022 05:10) (89/55 - 128/68)  BP(mean): --  RR: 18 (05 Oct 2022 05:10) (18 - 24)  SpO2: 94% (05 Oct 2022 05:10) (94% - 100%)    Parameters below as of 05 Oct 2022 05:10  Patient On (Oxygen Delivery Method): BiPAP/CPAP      CAPILLARY BLOOD GLUCOSE      POCT Blood Glucose.: 149 mg/dL (05 Oct 2022 06:41)  POCT Blood Glucose.: 174 mg/dL (05 Oct 2022 04:56)  POCT Blood Glucose.: 164 mg/dL (05 Oct 2022 00:06)  POCT Blood Glucose.: 169 mg/dL (04 Oct 2022 21:15)  POCT Blood Glucose.: 221 mg/dL (04 Oct 2022 16:23)  POCT Blood Glucose.: 360 mg/dL (04 Oct 2022 12:08)    I&O's Summary    03 Oct 2022 07:01  -  04 Oct 2022 07:00  --------------------------------------------------------  IN: 0 mL / OUT: 100 mL / NET: -100 mL        PHYSICAL EXAM:  HEAD:  Atraumatic, Normocephalic  NECK: Supple, No   JVD  CHEST/LUNG:   no     rales,     no,    rhonchi  HEART: Regular rate and rhythm;         murmur  ABDOMEN: Soft, Nontender, ;   EXTREMITIES:    no    edema  NEUROLOGY:  alert    LABS:                        7.3    7.15  )-----------( 136      ( 05 Oct 2022 06:14 )             25.2     10-05    135  |  99  |  26<H>  ----------------------------<  135<H>  4.5   |  24  |  0.64    Ca    8.7      05 Oct 2022 06:14                      Thyroid Stimulating Hormone, Serum: 0.32 uIU/mL (09-24 @ 11:34)          Consultant(s) Notes Reviewed:      Care Discussed with Consultants/Other Providers:

## 2022-10-05 NOTE — PROVIDER CONTACT NOTE (OTHER) - ACTION/TREATMENT ORDERED:
Provider orders to give xanax PRN now and okays to give it early.
ACP aware, at bedside, as per ACP pt denying abdominal discomfort, improved breathing after duoneb, ok to restart feeds and continue to monitor
VBG ordered
ACP aware, consent pending
Duo  neb given x1, Tramadol given for c/o pain. Pulse ox 98% on 3L nasal cannula.
ACP aware, as per ACP hold feeds, NPH, Cardizem, give sliding scale insulin
As per PA ok to keep off bipap, give med via PEG and continue feeds until nighttime bipap placement
Evelyne NP aware, continue to monitor
As per provider hold insulin.
Provider made aware of SBP less than 100. Stop tube feeds restart AVAPs early to prevent increase in WOB.
provider orders for AVAPs

## 2022-10-05 NOTE — PROVIDER CONTACT NOTE (OTHER) - REASON
blood transfusion/lasix
Pt tachpyneic, increased work of breathing
Discontinuation of Isolation for COVID patient
MEWS notification
LAB REFUSAL
Pt is tachypneic, abdomen distended, complains of discomfort
Tachapneic
unable to change IV or place midline, pt hard stick
C/o difficulty breathing.
restart tube feeds?
Pt due for Insulin, tube feeds being held pt on continuous avaps glucose 172
Tachyapnea and DIXON

## 2022-10-05 NOTE — PROGRESS NOTE ADULT - ASSESSMENT
89    year old female    h/o hemorrhagic CVA, has  PEG,  HTN, MI,   HLD, gout, right ca  breast   right Ca breast. s/p mastectomy in 2019,  s/p  sentinel node  localization.  4/4,  were  negative  s/p rrt  . in past,  for hypoxia. cxr with complete  collapsed  of  left lung, needing broch,   s p  bronchoscopy , pt  with  tracheomalacia  and  collapsed  airways,  makes  this a  difficult  situation, as there is no good rx for this     h/o bacteremia. on   pna, perforated  appendicitis,  ?  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 this will not alter rx. pt  redvd  extended  course  of ab   on iv  vanco and ertapenem.  till  2/9/.22.        *  admitted with   presumed  resp  failure/ pt was  sent back from [facility], the same day   pt seen in er.  appears   at her naseline.  no  wheezing /  atousable    ENT eval w/ laryngoscopy notable for vocal cords mobile and intact, no edema, upper airway sherwood  *   s/p cva, has  PEG,  npo,  on  synthroid, Keppra  ,  *   HTN, on  cardizem,  per  card dr jamison  *     h/o  Ca breast. /, s/p  R  mastectomy  *   obesity, bmi  was   41, now  is  30   *    c/c left  leg contracture ,  remains  bed  bound. functional  paraplegia,  needs  assistance  for  all her  adl's   *  pt  with  h/o  tracheomalacia  and  collapsed  airways, propensity  for  lung collpse,   pt is  at risk for  re admissions     on nocturnal  bipap,  difficult  situation, a s there  is no  good  rx  for  pt's  recurrent lung collapse hypoxia     h/o  of  chronic   mild  tachycardia      *   CT chest. lytic  lesions, T  spine/  ribs/ humerus/  oncoloigy  d r ohri.  suspect  metastatic ca breast          son. guicho Dan/ oncologist  has  also  discussed  with  son,  futility of  pursuing  bx. a s pt  is  not an ideal   candidate  for    chemo// CT  A.p ,/ prelim ,  pelvic  mets  per  oncology,   suspect  metastatic ca  breast,  was  awaiting   mri  spine/  pt unable  to tolerate  mri   per oncology, no  further  intervention/  and on  Arimidex, now,   per d r ohri   obesity,  bed bound.  functional paraplegia    per  son,   rehab promptly  returned   to er/ as they  did not  have  a  bipap machine /  care cortn to f/.p, needs  24/7  O2/ nocturnal bipap   difficult  situation,  bed  bound. obesity. c/c  hypercarbia, needing , prn /  nocturnal bipap/  with intermittent tachypnea    now  is  covid +. on iv steroid/ was  wheezing/ remdissivir/, seen by  house  ID   pt  with  frequent episodes  of tachypnea. has  obstructive /restrictive   lung  disease ,  obesity/ RAQUEL/ hypercarbia   called son, casey[place]  times,  message  left/    pt  remains  at  her  baseline ,   verbal  at  times  Afib, on xarelto  20m mg qd    s/p tachycardia, on   cardizem  and digoxin/  remains on iv steroid  per  pulm    pt  with poor  access   and  IR  note  seen, pt unable to lie  supine   pt at  baseline,  has  never  been  able  to  lie  supine,  given all  her  co  morbidities,   and pt  has  been  optimized   to  fullest  degree possible     on   prednisolone,  via  peg  pt 's  situation is tenuous   there  ie  no  room  for further    intervention. , CT  chest  prelim, atelectasis  pt  with  ongoing intermittent tachycardia/  worsening  tachypnea/    s/p  rrt on 9/30  for tachypnea     cbc pending   pt  with  tachypnea  mostly,  bed  bound,  hence  difficult  to  initiate   any   d/c  planning   son remains  unavailable, lately   pt is  ideal  for palliative care   requested  palliative   care team  to  assist with  goc. at  this  juncture, given pt;s  condition, difficult  to initiaite   d/c planning,  given  he r unstable condition   hb is  7/  prbc       per  son,.  from before  pt is  full code                    89    year old female    h/o hemorrhagic CVA, has  PEG,  HTN, MI,   HLD, gout, right ca  breast   right Ca breast. s/p mastectomy in 2019,  s/p  sentinel node  localization.  4/4,  were  negative  s/p rrt  . in past,  for hypoxia. cxr with complete  collapsed  of  left lung, needing broch,   s p  bronchoscopy , pt  with  tracheomalacia  and  collapsed  airways,  makes  this a  difficult  situation, as there is no good rx for this     h/o bacteremia. on   pna, perforated  appendicitis,  ?  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 this will not alter rx. pt  redvd  extended  course  of ab   on iv  vanco and ertapenem.  till  2/9/.22.        *  admitted with   presumed  resp  failure/ pt was  sent back from [facility], the same day   pt seen in er.  appears   at her naseline.  no  wheezing /  atousable    ENT eval w/ laryngoscopy notable for vocal cords mobile and intact, no edema, upper airway sherwood  *   s/p cva, has  PEG,  npo,  on  synthroid, Keppra  ,  *   HTN, on  cardizem,  per  card dr jamison  *     h/o  Ca breast. /, s/p  R  mastectomy  *   obesity, bmi  was   41, now  is  30   *    c/c left  leg contracture ,  remains  bed  bound. functional  paraplegia,  needs  assistance  for  all her  adl's   *  pt  with  h/o  tracheomalacia  and  collapsed  airways, propensity  for  lung collpse,   pt is  at risk for  re admissions     on nocturnal  bipap,  difficult  situation, a s there  is no  good  rx  for  pt's  recurrent lung collapse hypoxia     h/o  of  chronic   mild  tachycardia      *   CT chest. lytic  lesions, T  spine/  ribs/ humerus/  oncoloigy  d r ohri.  suspect  metastatic ca breast          son. guicho Dan/ oncologist  has  also  discussed  with  son,  futility of  pursuing  bx. a s pt  is  not an ideal   candidate  for    chemo// CT  A.p ,/ prelim ,  pelvic  mets  per  oncology,   suspect  metastatic ca  breast,  was  awaiting   mri  spine/  pt unable  to tolerate  mri   per oncology, no  further  intervention/  and on  Arimidex, now,   per d r ohri   obesity,  bed bound.  functional paraplegia    per  son,   rehab promptly  returned   to er/ as they  did not  have  a  bipap machine /  care cortn to f/.p, needs  24/7  O2/ nocturnal bipap   difficult  situation,  bed  bound. obesity. c/c  hypercarbia, needing , prn /  nocturnal bipap/  with intermittent tachypnea    now  is  covid +. on iv steroid/ was  wheezing/ remdissivir/, seen by  house  ID   pt  with  frequent episodes  of tachypnea. has  obstructive /restrictive   lung  disease ,  obesity/ RAQUEL/ hypercarbia   called [facility]  times,  message  left/    pt  remains  at  her  baseline ,   verbal  at  times  Afib, on xarelto  20m mg qd    s/p tachycardia, on   cardizem  and digoxin/  remains on iv steroid  per  pulm    pt  with poor  access   and  IR  note  seen, pt unable to lie  supine   pt at  baseline,  has  never  been  able  to  lie  supine,  given all  her  co  morbidities,   and pt  has  been  optimized   to  fullest  degree possible     on   prednisolone,  via  peg  pt 's  situation is tenuous   there  ie  no  room  for further    intervention. , CT  chest  prelim, atelectasis  pt  with  ongoing intermittent tachycardia/  worsening  tachypnea/    s/p  rrt on 9/30  for tachypnea   pt  with  tachypnea  mostly,  bed  bound,  hence  difficult  to  initiate   any   d/c  planning   son remains  unavailable, lately   pt is  ideal  for palliative care   requested  palliative   care team  to  assist with  goc. at  this  juncture, given pt;s  condition, difficult  to initiaite   d/c planning,  given  her  unstable condition   hb is  7/  prbc   called  son Sy , unable  to reach         per  son,.  from before  pt is  full code

## 2022-10-05 NOTE — PROGRESS NOTE ADULT - ASSESSMENT
Assessment  DMT2: 87y Female with DM T2 with hyperglycemia, A1C 7%, on NPH insulin and coverage, NPH dose held 2/2 blood sugars are improving, no new  hypoglycemic episodes, on peg tube feeds,  remains on prednisolone.  Covid: on medications, stable, monitored.  Hypothyroidism: On Synthroid 75 mcg po daily, ?compliance, euthyroid.  HLD: on statin.        Jeanie Gao MD  Cell: [phone]  Office: [phone]

## 2022-10-05 NOTE — PROGRESS NOTE ADULT - SUBJECTIVE AND OBJECTIVE BOX
[facility] - PROGRESS NOTE        INTERVAL HISTORY: discussions are ongoing about possible transfer to the PCU; the patient's son does not want us to give up and suggests getting a different bed that she will not slide down in;    REVIEW OF SYSTEMS:  Constitutional: As per interval history  HEENT: Within normal limits  CV: As per interval history  Resp: As per interval history  GI: Within normal limits   : Within normal limits  Musculoskeletal: Within normal limits  Skin: Within normal limits  Neurological: Within normal limits  Psychiatric: Within normal limits  Endocrine: Within normal limits  Hematologic/Lymphatic: Within normal limits  Allergic/Immunologic: Within normal limits    [ ] Unable to assess ROS because     MEDICATIONS:     Pulmonary "  albuterol/ipratropium for Nebulization 3 milliLiter(s) Nebulizer every 6 hours  buDESOnide    Inhalation Suspension 0.5 milliGRAM(s) Inhalation every 12 hours  guaiFENesin Oral Liquid (Sugar-Free) 300 milliGRAM(s) Oral every 6 hours    Anti-microbials:    Cardiovascular:  digoxin     Tablet 125 MICROGram(s) Oral daily  diltiazem    Tablet 30 milliGRAM(s) Enteral Tube every 6 hours  furosemide   Injectable 20 milliGRAM(s) IV Push once    Other:  allopurinol 100 milliGRAM(s) Oral daily  anastrozole 1 milliGRAM(s) Oral daily  atorvastatin 20 milliGRAM(s) Oral at bedtime  bisacodyl 5 milliGRAM(s) Oral at bedtime  chlorhexidine 2% Cloths 1 Application(s) Topical <User Schedule>  dextrose 50% Injectable 25 Gram(s) IV Push once  dextrose 50% Injectable 25 Gram(s) IV Push once  dextrose 50% Injectable 12.5 Gram(s) IV Push once  dextrose 50% Injectable 25 Gram(s) IV Push once  dextrose Oral Gel 15 Gram(s) Oral once  glucagon  Injectable 1 milliGRAM(s) IntraMuscular once  insulin lispro (ADMELOG) corrective regimen sliding scale   SubCutaneous every 6 hours  insulin NPH human recombinant 7 Unit(s) SubCutaneous every 6 hours  levETIRAcetam  Solution 750 milliGRAM(s) Oral two times a day  levothyroxine 75 MICROGram(s) Oral daily  melatonin 3 milliGRAM(s) Oral at bedtime  prednisoLONE    3 mG/mL Solution (ORAPRED) 45 milliGRAM(s) Oral daily  prednisoLONE    3 mG/mL Solution (ORAPRED)   Oral   rivaroxaban 20 milliGRAM(s) Oral with dinner  senna 2 Tablet(s) Oral at bedtime    MEDICATIONS  (PRN):  acetaminophen     Tablet .. 650 milliGRAM(s) Oral every 6 hours PRN Temp greater or equal to 38C (100.4F), Mild Pain (1 - 3)  ALPRAZolam 0.25 milliGRAM(s) Oral every 8 hours PRN anxiety  polyethylene glycol 3350 17 Gram(s) Oral daily PRN Constipation        OBJECTIVE:    POCT Blood Glucose.: 162 mg/dL (05 Oct 2022 12:00)  POCT Blood Glucose.: 149 mg/dL (05 Oct 2022 06:41)  POCT Blood Glucose.: 174 mg/dL (05 Oct 2022 04:56)  POCT Blood Glucose.: 164 mg/dL (05 Oct 2022 00:06)  POCT Blood Glucose.: 169 mg/dL (04 Oct 2022 21:15)      PHYSICAL EXAM:       ICU Vital Signs Last 24 Hrs  T(C): 36.9 (05 Oct 2022 16:13), Max: 37 (05 Oct 2022 15:40)  T(F): 98.4 (05 Oct 2022 16:13), Max: 98.6 (05 Oct 2022 15:40)  HR: 78 (05 Oct 2022 16:13) (66 - 83)  BP: 100/67 (05 Oct 2022 16:13) (89/52 - 128/68)  BP(mean): --  ABP: --  ABP(mean): --  RR: 22 (05 Oct 2022 16:13) (18 - 22)  SpO2: 97% (05 Oct 2022 16:13) (94% - 100%)    O2 Parameters below as of 05 Oct 2022 16:13  Patient On (Oxygen Delivery Method): BiPAP/CPAP           General: Awake. Alert. Cooperative. No distress. Appears stated age.	  HEENT: Atraumatic. Normocephalic. Anicteric. Normal oral mucosa. PERRL. EOMI.  Neck: Supple. Trachea midline. Thyroid without enlargement/tenderness/nodules. No carotid bruit. No JVD.	  Cardiovascular: Regular rate and rhythm. S1 S2 normal. No murmurs, rubs or gallops.  Respiratory: Respirations unlabored. Clear to auscultation and percussion bilaterally. No curvature.  Abdomen: Soft. Non-tender. Non-distended. No organomegaly. No masses. Normal bowel sounds.  Extremities: Warm to touch. No clubbing or cyanosis. No pedal edema.  Pulses: 2+ peripheral pulses all extremities.	  Skin: Normal skin color. No rashes or lesions. No ecchymoses. No cyanosis. Warm to touch.  Lymph Nodes: Cervical, supraclavicular and axillary nodes normal  Neurological: Motor and sensory examination equal and normal. A and O x 3  Psychiatry: Appropriate mood and affect.    LABS:                          7.3    7.15  )-----------( 136      ( 05 Oct 2022 06:14 )             25.2     CBC    WBC  7.15 <==, 8.39 <==, See Note <==, QNS <==    Hemoglobin  7.3 <<==, 7.9 <<==, See note <<==, See note <<==    Hematocrit  25.2 <==, 26.4 <==, See note <==, See note <==    Platelets  136 <==, 140 <==, See note <==, QNS <==      135  |  99  |  26<H>  ----------------------------<  135<H>    10-05  4.5   |  24  |  0.64      LYTES    sodium  135 <==, 136 <==, 139 <==, 145 <==, 151 <==    potassium   4.5 <==, 5.1 <==, 4.6 <==, 4.9 <==, 4.3 <==    chloride  99 <==, 99 <==, 103 <==, 107 <==, 114 <==    carbon dioxide  24 <==, 23 <==, 24 <==, 20 <==, 20 <==    =============================================================================================  RENAL FUNCTION:    Creatinine:   0.64  <<==, 0.73  <<==, 0.76  <<==, 0.79  <<==, 0.63  <<==    BUN:   26 <==, 34 <==, 37 <==, 37 <==, 35 <==    ============================================================================================    calcium   8.7 <==, 8.4 <==, 8.8 <==, 8.6 <==, 8.7 <==    ============================================================================================  LFTs    AST:   24 <==     ALT:  20  <==     AP:  201  <=    Bili:  0.5  <=                      MICROBIOLOGY:       RADIOLOGY:  [x] Chest radiographs reviewed and interpreted by me     [facility] - PROGRESS NOTE    CHIEF COMPLAINT: COVID infection; chronic hypoxic/hypercapnic respiratory failure; mucous plugging; atelectasis; tracheobronchomalacia; bronchospasm; weak cough; 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; PEG in place    INTERVAL HISTORY: discussions are ongoing about possible transfer to the PCU; the patient's son does not want us to give up and suggests getting a different bed so that she will not slide down; NIV dependent; flat in bed; neck flexed onto the anterior chest wall; no cough, sputum production, hemoptysis, chest congestion or wheeze; no fevers, chills or sweats; no chest pain/pressure or palpitations;    REVIEW OF SYSTEMS:  Constitutional: As per interval history  HEENT: Within normal limits  CV: As per interval history  Resp: As per interval history  GI: dysphagia  : Within normal limits  Musculoskeletal: Within normal limits  Skin: Within normal limits  Neurological: CVA - left sided plegia  Psychiatric: Within normal limits  Endocrine: Within normal limits  Hematologic/Lymphatic: Within normal limits  Allergic/Immunologic: Within normal limits    MEDICATIONS:     Pulmonary "  albuterol/ipratropium for Nebulization 3 milliLiter(s) Nebulizer every 6 hours  buDESOnide    Inhalation Suspension 0.5 milliGRAM(s) Inhalation every 12 hours  guaiFENesin Oral Liquid (Sugar-Free) 300 milliGRAM(s) Oral every 6 hours    Anti-microbials:    Cardiovascular:  digoxin     Tablet 125 MICROGram(s) Oral daily  diltiazem    Tablet 30 milliGRAM(s) Enteral Tube every 6 hours  furosemide   Injectable 20 milliGRAM(s) IV Push once    Other:  allopurinol 100 milliGRAM(s) Oral daily  anastrozole 1 milliGRAM(s) Oral daily  atorvastatin 20 milliGRAM(s) Oral at bedtime  bisacodyl 5 milliGRAM(s) Oral at bedtime  chlorhexidine 2% Cloths 1 Application(s) Topical <User Schedule>  dextrose 50% Injectable 25 Gram(s) IV Push once  dextrose 50% Injectable 25 Gram(s) IV Push once  dextrose 50% Injectable 12.5 Gram(s) IV Push once  dextrose 50% Injectable 25 Gram(s) IV Push once  dextrose Oral Gel 15 Gram(s) Oral once  glucagon  Injectable 1 milliGRAM(s) IntraMuscular once  insulin lispro (ADMELOG) corrective regimen sliding scale   SubCutaneous every 6 hours  insulin NPH human recombinant 7 Unit(s) SubCutaneous every 6 hours  levETIRAcetam  Solution 750 milliGRAM(s) Oral two times a day  levothyroxine 75 MICROGram(s) Oral daily  melatonin 3 milliGRAM(s) Oral at bedtime  prednisoLONE    3 mG/mL Solution (ORAPRED) 45 milliGRAM(s) Oral daily  prednisoLONE    3 mG/mL Solution (ORAPRED)   Oral   rivaroxaban 20 milliGRAM(s) Oral with dinner  senna 2 Tablet(s) Oral at bedtime    MEDICATIONS  (PRN):  acetaminophen     Tablet .. 650 milliGRAM(s) Oral every 6 hours PRN Temp greater or equal to 38C (100.4F), Mild Pain (1 - 3)  ALPRAZolam 0.25 milliGRAM(s) Oral every 8 hours PRN anxiety  polyethylene glycol 3350 17 Gram(s) Oral daily PRN Constipation    OBJECTIVE:    POCT Blood Glucose.: 162 mg/dL (05 Oct 2022 12:00)  POCT Blood Glucose.: 149 mg/dL (05 Oct 2022 06:41)  POCT Blood Glucose.: 174 mg/dL (05 Oct 2022 04:56)  POCT Blood Glucose.: 164 mg/dL (05 Oct 2022 00:06)  POCT Blood Glucose.: 169 mg/dL (04 Oct 2022 21:15)      PHYSICAL EXAM:       ICU Vital Signs Last 24 Hrs  T(C): 36.9 (05 Oct 2022 16:13), Max: 37 (05 Oct 2022 15:40)  T(F): 98.4 (05 Oct 2022 16:13), Max: 98.6 (05 Oct 2022 15:40)  HR: 78 (05 Oct 2022 16:13) (66 - 83)  BP: 100/67 (05 Oct 2022 16:13) (89/52 - 128/68)  BP(mean): --  ABP: --  ABP(mean): --  RR: 22 (05 Oct 2022 16:13) (18 - 22)  SpO2: 97% (05 Oct 2022 16:13) (94% - 100%)    O2 Parameters below as of 05 Oct 2022 16:13  Patient On (Oxygen Delivery Method): BiPAP/CPAP    General: Awake. Alert. Cooperative. Appears stated age. Obese. Laying flat in bed. BIPAP  HEENT: Atraumatic. Normocephalic. Anicteric. Normal oral mucosa. PERRL. EOMI. Mallampati class IV airway. Poor dentition. Full face mask  Neck: Supple. Trachea midline. Thyroid without enlargement/tenderness/nodules. No carotid bruit. No JVD. Short and wide. Neck flexed with chin resting on her anterior chest.  Cardiovascular: Regular rate and rhythm. S1 S2 normal. III/VI systolic murmur  Respiratory: Respirations unlabored. Clear anteriorly. Kyphosis. Poor chest wall excursion  Abdomen: Soft. Non-tender. Non-distended. No organomegaly. No masses. Normal bowel sounds. Obese. PEG.  Extremities: Warm to touch. No clubbing or cyanosis. No pedal edema. Large legs. Left leg contracted under the right leg  Pulses: 2+ peripheral pulses all extremities.	  Skin: Normal skin color. No rashes or lesions. No ecchymoses. No cyanosis. Warm to touch.  Lymph Nodes: Cervical, supraclavicular and axillary nodes normal  Neurological: Left lower extremity plegia with contraction. Left upper extremity is quite weak. A and O x 3    LABS:                          7.3    7.15  )-----------( 136      ( 05 Oct 2022 06:14 )             25.2     CBC    WBC  7.15 <==, 8.39 <==, See Note <==, QNS <==    Hemoglobin  7.3 <<==, 7.9 <<==, See note <<==, See note <<==    Hematocrit  25.2 <==, 26.4 <==, See note <==, See note <==    Platelets  136 <==, 140 <==, See note <==, QNS <==      135  |  99  |  26<H>  ----------------------------<  135<H>    10-05  4.5   |  24  |  0.64      LYTES    sodium  135 <==, 136 <==, 139 <==, 145 <==, 151 <==    potassium   4.5 <==, 5.1 <==, 4.6 <==, 4.9 <==, 4.3 <==    chloride  99 <==, 99 <==, 103 <==, 107 <==, 114 <==    carbon dioxide  24 <==, 23 <==, 24 <==, 20 <==, 20 <==    =============================================================================================  RENAL FUNCTION:    Creatinine:   0.64  <<==, 0.73  <<==, 0.76  <<==, 0.79  <<==, 0.63  <<==    BUN:   26 <==, 34 <==, 37 <==, 37 <==, 35 <==    ============================================================================================    calcium   8.7 <==, 8.4 <==, 8.8 <==, 8.6 <==, 8.7 <==    ============================================================================================  LFTs    AST:   24 <==     ALT:  20  <==     AP:  201  <=    Bili:  0.5  <=    PT/INR - ( 24 Sep 2022 11:34 )   PT: 11.3 sec;   INR: 0.98 ratio      Venous Blood Gas:  09-27 @ 11:15  7.42/45/61/29/90.4  VBG Lactate: 2.3    ABG - ( 21 Sep 2022 14:19 )  pH: 7.51  /  pCO2: 33    /  pO2: 158   / HCO3: 26    / Base Excess: 3.6   /  SaO2: 100.0     Venous Blood Gas:  09-19 @ 09:30  7.34/53/50/29/80.0  VBG Lactate: 3.5    Venous Blood Gas:  09-18 @ 03:50  7.39/48/47/29/78.2  VBG Lactate: 1.7    Procalcitonin, Serum: 0.23 ng/mL (09-18 @ 09:59)    D-Dimer Assay, Quantitative (09.18.22 @ 09:59)   D-Dimer Assay, Quantitative: 196:    C-Reactive Protein, Serum (09.18.22 @ 09:59)   C-Reactive Protein, Serum: 5 mg/L     Ferritin, Serum in AM (09.18.22 @ 09:59)   Ferritin, Serum: 63 ng/mL     < from: TTE with Doppler (w/Cont) (01.21.22 @ 14:08) >    Patient name: FRANKI MEHTA  YOB: 1935   Age: 86 (F)   MR#: [id number]  Study Date: 1/21/2022  ------------------------------------------------------------------------  Dimensions:    Normal Values:  LA:     2.5    2.0 - 4.0 cm  Ao:     3.3    2.0 - 3.8 cm  SEPTUM: 1.6    0.6 - 1.2 cm  PWT:    0.9    0.6 - 1.1 cm  LVIDd:  3.6    3.0 - 5.6 cm  LVIDs:  2.5    1.8 - 4.0 cm  Derived variables:  LVMI: 77 g/m2  RWT: 0.50  Fractional short: 31 %  EF (Visual Estimate): 70-75 %  ------------------------------------------------------------------------  Conclusions:  1. Concentric left ventricular hypertrophy.  2. Hyperdynamic left ventricular systolic function.  Endocardial visualization enhanced with intravenous  injection of Ultrasonic Enhancing Agent (Definity).  3. The right ventricle is not well visualized; grossly  normal right ventricular systolic function.  Pt refused to proceed with exam.  Limited Doppler study.  No trans aortic gradients.  Unable to rule out endocarditis.  ------------------------------------------------------------------------  Confirmed on 1/21/2022 - 15:42:57 by Kole Mcfarland M.D.  [facility]  ------------------------------------------------------------------------  ---------------------------------------------------------------------------------------------------------------    MICROBIOLOGY:     COVID-19 PCR . (09.19.22 @ 18:31)   COVID-19 PCR: Detected:    Respiratory Viral Panel with COVID-19 by RYAN (09.08.22 @ 21:38)   Rapid RVP Result: [facility]   SARS-CoV-2: [facility]  This Respiratory Panel uses polymerase chain reaction (PCR) to detect for   adenovirus; coronavirus (HKU1, NL63, 229E, OC43); human metapneumovirus   (hMPV); human enterovirus/rhinovirus (Entero/RV); influenza A; influenza   A/H1; influenza A/H3; influenza A/H1-2009; influenza B; parainfluenza   viruses 1, 2, 3, 4; respiratory syncytial virus; Mycoplasma pneumoniae;   Chlamydophila pneumoniae; and SARS-CoV-2.     Culture - Blood (09.08.22 @ 20:39)   Specimen Source: .Blood Blood-Peripheral   Culture Results:   No growth to date.     Culture - Blood (09.08.22 @ 20:25)   Specimen Source: .Blood Blood-Peripheral   Culture Results:   No growth to date.     RADIOLOGY:  [x ] Chest radiographs reviewed and interpreted by me     EXAM:  CT CHEST                          PROCEDURE DATE:  09/28/2022      FINDINGS:    AIRWAYS, LUNGS and PLEURA: Patent central airways. Stable subsegmental   atelectasis within posterior upper lobes, lingula and basilar lower   lobes. Trace fluid within the left fissure. Otherwise, no significant   pleural effusion or pneumothorax.    MEDIASTINUM AND GIOVANA: No lymphadenopathy.    HEART AND VESSELS: Heart size is normal. No pericardial effusion.   Thoracic aorta and pulmonary artery normal in diameter. Calcifications of   the coronary arteries and aorta.    VISUALIZED UPPER ABDOMEN: Within normal limits.    CHEST WALL AND BONES: New T8 compression deformity. Redemonstration of T2   and T3 compression fractures. Unchanged mild T11 wedge deformity.   Redemonstration of multiple lytic lesions in the thoracic spine,   unchanged from 9/14/2022. Fixation plate and screws within the left   proximal humerus. Status post right-sided mastectomy.    LOWER NECK: Within normal limits.    MISCELLANEOUS: Near-complete opacification of the right maxillary sinus.   Mucosal thickening of the left maxillary sinus.    IMPRESSION:    The airways are patent without mucoid impaction.    Mild bilateral areas of subsegmental atelectasis. The lungs are otherwise   clear.    Diffuse lytic bone lesions are again noted. Mild-moderate loss of body   height of T8 vertebral body is new from prior exam.    ADE CAMACHO MD; Resident Radiologist  This document has been electronically signed.  GIANNI MANTILLA MD; Attending Radiologist  This document has been electronically signed. Sep 29 2022 10:50AM  ---------------------------------------------------------------------------------------------------------------    EXAM:  XR CHEST PORTABLE URGENT 1V                          PROCEDURE DATE:  09/19/2022      FINDINGS:  Radiographic interpretation is limited by patient positioning and   overlying artifact.  Gastrostomy tube is noted.  Left humeral fixation plate and screws are noted.  The heart size cannot be accurately assessed in this projection.  Left basilar hazy opacities  Low lung volumes bilaterally.  There is no pneumothorax or large pleural effusion.  No acute bony abnormality.    IMPRESSION:  Left basilar hazy opacity may represent atelectasis.    AMY JARAMILLO MD; Resident Radiologist  This document has been electronically signed.  DIANE CLARK MD; Attending Radiologist  This document has been electronically signed. Sep 19 2022  6:08PM  --------------------------------------------------------------------------------------------------------------  EXAM:  CT CHEST                          PROCEDURE DATE:  09/14/2022      FINDINGS:    LUNGS AND AIRWAYS: Patent central airways.  Subsegmental atelectasis is   seen in the bilateral upper and lower lobes.  PLEURA: No pleural effusion.  MEDIASTINUM AND GIOVANA: No lymphadenopathy.  VESSELS: Calcifications of the aorta, aortic valve and coronary arteries.  HEART: Heart size is normal. No pericardial effusion.  CHEST WALL AND LOWER NECK: Within normal limits.  VISUALIZED UPPER ABDOMEN: Cholelithiasis.  BONES: Multiple lytic bone lesions are again seen including a destructive   lesion at the level of the T5 vertebral body that may extend into the   left neural foramen (3:42), unchanged from prior CT chest.    IMPRESSION:  Subsegmental atelectasis in the bilateral upper and lower lobes.    Lytic bone lesions as described above, unchanged from prior CT chest   8/29/2022. Recommend MR thoracic spine for further evaluation.     ALEXANDRIA COLLINS MD; Resident Radiologist  This document has been electronically signed.  HAY IRVIN MD; Attending Radiologist  This document has been electronically signed. Sep 14 2022  3:23PM  ---------------------------------------------------------------------------------------------------------------  EXAM:  DUPLEX SCAN EXT VEINS LOWER BI                          PROCEDURE DATE:  09/10/2022      IMPRESSION:  Limited study  No evidence of deep venous thrombosis in either lower extremity.  Limited visualization of the calf veins.    KEYLA ROMERO MD; Attending Radiologist  This document has been electronically signed. Sep 10 2022 10:42AM  ---------------------------------------------------------------------------------------------------------------  EXAM:  CT ABDOMEN AND PELVIS IC                          PROCEDURE DATE:  08/31/2022      IMPRESSION:    Multiple lytic lesions are seen throughout the axial and appendicular   skeleton, some of which appear increased in size from CT abdomen pelvis   3/16/2022 when evaluated in retrospect. A few lytic lesions involve the   left intertrochanteric region and right acetabulum.    Endometrial thickening. Recommend dedicated pelvic sonogram.    Cystic lesions are seen within the pancreas. Recommend a dedicated MRI on   a nonemergent basis.    SULY KENDRICK MD; Resident Radiology  This document has been electronically signed.  BRITTANI MALCOLM MD; Attending Radiologist  This document has been electronically signed. Sep  1 2022 11:01AM  ---------------------------------------------------------------------------------------------------------------  EXAM:  XR HUMERUS MIN 2 VIEWS RT                          PROCEDURE DATE:  08/30/2022      EXAM:  Internal/external rotation AP right humerus from 8/30/2022 at 0927.   Compared to appearance on previous day chest CT.    IMPRESSION:  Generalized osteopenia. Redemonstrated focal lytic lesion in proximal   medial humeral diaphysis with small area of permeative cortical   destruction. No additional radiographically appreciated suspicious lytic   or blastic lesions in remaining imaged regions.    No current fractures or dislocations.    Preserved shoulder and elbow joint spaces.    IV cannula with attached tubing overlies upper arm.    MINE NICE MD; Attending Radiologist  This document has been electronically signed. Aug 30 2022 10:39AM  ---------------------------------------------------------------------------------------------------------------  EXAM:  DUPLEX EXT VEINS UPPER LT                          PROCEDURE DATE:  09/01/2022      IMPRESSION:  No evidence of left upper extremity deep venous thrombosis.    FAWN FITZGERALD MD; Attending Radiologist  This document has been electronically signed. Sep  1 2022  1:10PM  ---------------------------------------------------------------------------------------------------------------

## 2022-10-05 NOTE — PROVIDER CONTACT NOTE (OTHER) - DATE AND TIME:
18-Sep-2022 15:32
23-Sep-2022 09:30
25-Sep-2022 18:00
17-Sep-2022 22:30
05-Oct-2022 09:00
16-Sep-2022 11:55
21-Sep-2022 00:30
26-Sep-2022 11:38
30-Sep-2022 18:20
21-Sep-2022 22:00
04-Oct-2022 12:00
19-Sep-2022 13:04

## 2022-10-05 NOTE — PROGRESS NOTE ADULT - SUBJECTIVE AND OBJECTIVE BOX
Chief complaint    Patient is a 87y old  Female who presents with a chief complaint of SOB (05 Oct 2022 11:49)   Review of systems  Patient in bed, appears comfortable.    Labs and Fingersticks  CAPILLARY BLOOD GLUCOSE      POCT Blood Glucose.: 162 mg/dL (05 Oct 2022 12:00)  POCT Blood Glucose.: 149 mg/dL (05 Oct 2022 06:41)  POCT Blood Glucose.: 174 mg/dL (05 Oct 2022 04:56)  POCT Blood Glucose.: 164 mg/dL (05 Oct 2022 00:06)  POCT Blood Glucose.: 169 mg/dL (04 Oct 2022 21:15)  POCT Blood Glucose.: 221 mg/dL (04 Oct 2022 16:23)      Anion Gap, Serum: 12 (10-05 @ 06:14)  Anion Gap, Serum: 14 (10-04 @ 12:15)      Calcium, Total Serum: 8.7 (10-05 @ 06:14)  Calcium, Total Serum: 8.4 (10-04 @ 12:15)          10-05    135  |  99  |  26<H>  ----------------------------<  135<H>  4.5   |  24  |  0.64    Ca    8.7      05 Oct 2022 06:14                          7.3    7.15  )-----------( 136      ( 05 Oct 2022 06:14 )             25.2     Medications  MEDICATIONS  (STANDING):  albuterol/ipratropium for Nebulization 3 milliLiter(s) Nebulizer every 6 hours  allopurinol 100 milliGRAM(s) Oral daily  anastrozole 1 milliGRAM(s) Oral daily  atorvastatin 20 milliGRAM(s) Oral at bedtime  bisacodyl 5 milliGRAM(s) Oral at bedtime  buDESOnide    Inhalation Suspension 0.5 milliGRAM(s) Inhalation every 12 hours  chlorhexidine 2% Cloths 1 Application(s) Topical <User Schedule>  dextrose 50% Injectable 25 Gram(s) IV Push once  dextrose 50% Injectable 25 Gram(s) IV Push once  dextrose 50% Injectable 12.5 Gram(s) IV Push once  dextrose 50% Injectable 25 Gram(s) IV Push once  dextrose Oral Gel 15 Gram(s) Oral once  digoxin     Tablet 125 MICROGram(s) Oral daily  diltiazem    Tablet 30 milliGRAM(s) Enteral Tube every 6 hours  furosemide   Injectable 20 milliGRAM(s) IV Push once  glucagon  Injectable 1 milliGRAM(s) IntraMuscular once  guaiFENesin Oral Liquid (Sugar-Free) 300 milliGRAM(s) Oral every 6 hours  insulin lispro (ADMELOG) corrective regimen sliding scale   SubCutaneous every 6 hours  insulin NPH human recombinant 7 Unit(s) SubCutaneous every 6 hours  levETIRAcetam  Solution 750 milliGRAM(s) Oral two times a day  levothyroxine 75 MICROGram(s) Oral daily  melatonin 3 milliGRAM(s) Oral at bedtime  prednisoLONE    3 mG/mL Solution (ORAPRED) 45 milliGRAM(s) Oral daily  prednisoLONE    3 mG/mL Solution (ORAPRED)   Oral   rivaroxaban 20 milliGRAM(s) Oral with dinner  senna 2 Tablet(s) Oral at bedtime      Physical Exam  General: Patient comfortable in bed  Vital Signs Last 12 Hrs  T(F): 98.4 (10-05-22 @ 11:02), Max: 98.4 (10-05-22 @ 11:02)  HR: 70 (10-05-22 @ 11:10) (66 - 72)  BP: 89/52 (10-05-22 @ 11:02) (89/52 - 94/84)  BP(mean): --  RR: 18 (10-05-22 @ 11:02) (18 - 18)  SpO2: 96% (10-05-22 @ 11:10) (94% - 96%)  Neck: No palpable thyroid nodules.  CVS: S1S2, No murmurs  Respiratory: No wheezing, no crepitations  GI: Abdomen soft, bowel sounds positive  Musculoskeletal:  edema lower extremities.     Diagnostics    Free Thyroxine, Serum: AM Sched. Collection: 01-Oct-2022 06:00 (09-30 @ 15:01)  Free Thyroxine, Serum: AM Sched. Collection: 24-Sep-2022 06:00  Cancel Reason: Dup Cancel (09-23 @ 12:07)  Thyroid Stimulating Hormone, Serum: AM Sched. Collection: 24-Sep-2022 06:00 (09-23 @ 12:07)  A1C with Estimated Average Glucose: Routine (09-23 @ 12:06)  A1C with Estimated Average Glucose: Routine  Cancel Reason: Lab Operations Cancel (09-23 @ 11:58)  A1C with Estimated Average Glucose: Routine (09-23 @ 10:31)  Free Thyroxine, Serum: AM Sched. Collection: 24-Sep-2022 06:00 (09-23 @ 09:23)  Thyroid Stimulating Hormone, Serum: AM Sched. Collection: 24-Sep-2022 06:00 (09-23 @ 09:23)

## 2022-10-06 NOTE — PROGRESS NOTE ADULT - SUBJECTIVE AND OBJECTIVE BOX
[facility]    REVIEW OF SYSTEMS:  GEN: no fever,    no chills  RESP: no SOB,   no cough  CVS: no chest pain,   no palpitations  GI: no abdominal pain,   no nausea,   no vomiting,   no constipation,   no diarrhea  : no dysuria,   no frequency  NEURO: no headache,   no dizziness  PSYCH: no depression,   not anxious  Derm : no rash    MEDICATIONS  (STANDING):  albuterol/ipratropium for Nebulization 3 milliLiter(s) Nebulizer every 6 hours  allopurinol 100 milliGRAM(s) Oral daily  anastrozole 1 milliGRAM(s) Oral daily  atorvastatin 20 milliGRAM(s) Oral at bedtime  bisacodyl 5 milliGRAM(s) Oral at bedtime  buDESOnide    Inhalation Suspension 0.5 milliGRAM(s) Inhalation every 12 hours  chlorhexidine 2% Cloths 1 Application(s) Topical <User Schedule>  dextrose 50% Injectable 25 Gram(s) IV Push once  dextrose 50% Injectable 25 Gram(s) IV Push once  dextrose 50% Injectable 12.5 Gram(s) IV Push once  dextrose 50% Injectable 25 Gram(s) IV Push once  dextrose Oral Gel 15 Gram(s) Oral once  digoxin     Tablet 125 MICROGram(s) Oral daily  diltiazem    Tablet 30 milliGRAM(s) Enteral Tube every 6 hours  glucagon  Injectable 1 milliGRAM(s) IntraMuscular once  guaiFENesin Oral Liquid (Sugar-Free) 300 milliGRAM(s) Oral every 6 hours  insulin lispro (ADMELOG) corrective regimen sliding scale   SubCutaneous every 6 hours  insulin NPH human recombinant 7 Unit(s) SubCutaneous every 6 hours  levETIRAcetam  Solution 750 milliGRAM(s) Oral two times a day  levothyroxine 75 MICROGram(s) Oral daily  melatonin 3 milliGRAM(s) Oral at bedtime  prednisoLONE    3 mG/mL Solution (ORAPRED)   Oral   rivaroxaban 20 milliGRAM(s) Oral with dinner  senna 2 Tablet(s) Oral at bedtime    MEDICATIONS  (PRN):  acetaminophen     Tablet .. 650 milliGRAM(s) Oral every 6 hours PRN Temp greater or equal to 38C (100.4F), Mild Pain (1 - 3)  ALPRAZolam 0.25 milliGRAM(s) Oral every 8 hours PRN anxiety  polyethylene glycol 3350 17 Gram(s) Oral daily PRN Constipation      Vital Signs Last 24 Hrs  T(C): 36.6 (06 Oct 2022 03:58), Max: 37 (05 Oct 2022 15:40)  T(F): 97.9 (06 Oct 2022 03:58), Max: 98.6 (05 Oct 2022 15:40)  HR: 76 (06 Oct 2022 03:58) (70 - 86)  BP: 95/58 (06 Oct 2022 03:58) (89/52 - 116/61)  BP(mean): --  RR: 20 (06 Oct 2022 03:58) (18 - 22)  SpO2: 99% (06 Oct 2022 03:58) (93% - 100%)    Parameters below as of 06 Oct 2022 03:58  Patient On (Oxygen Delivery Method): BiPAP/CPAP      CAPILLARY BLOOD GLUCOSE      POCT Blood Glucose.: 124 mg/dL (06 Oct 2022 05:20)  POCT Blood Glucose.: 122 mg/dL (06 Oct 2022 00:22)  POCT Blood Glucose.: 188 mg/dL (05 Oct 2022 17:06)  POCT Blood Glucose.: 162 mg/dL (05 Oct 2022 12:00)    I&O's Summary    05 Oct 2022 07:01  -  06 Oct 2022 07:00  --------------------------------------------------------  IN: 0 mL / OUT: 350 mL / NET: -350 mL        PHYSICAL EXAM:  HEAD:  Atraumatic, Normocephalic  NECK: Supple, No   JVD  CHEST/LUNG:   no     rales,     no,    rhonchi  HEART: Regular rate and rhythm;         murmur  ABDOMEN: Soft, Nontender, ;   EXTREMITIES:    no    edema  NEUROLOGY:  alert    LABS:                        7.3    7.15  )-----------( 136      ( 05 Oct 2022 06:14 )             25.2     10-05    135  |  99  |  26<H>  ----------------------------<  135<H>  4.5   |  24  |  0.64    Ca    8.7      05 Oct 2022 06:14                      Thyroid Stimulating Hormone, Serum: 0.32 uIU/mL (09-24 @ 11:34)          Consultant(s) Notes Reviewed:      Care Discussed with Consultants/Other Providers:

## 2022-10-06 NOTE — PROGRESS NOTE ADULT - SUBJECTIVE AND OBJECTIVE BOX
Chief complaint  Patient is a 87y old  Female who presents with a chief complaint of SOB (06 Oct 2022 07:55)   Review of systems  Patient in bed, looks comfortable, no hypoglycemic episodes.    Labs and Fingersticks  CAPILLARY BLOOD GLUCOSE      POCT Blood Glucose.: 185 mg/dL (06 Oct 2022 11:56)  POCT Blood Glucose.: 124 mg/dL (06 Oct 2022 05:20)  POCT Blood Glucose.: 122 mg/dL (06 Oct 2022 00:22)  POCT Blood Glucose.: 188 mg/dL (05 Oct 2022 17:06)      Anion Gap, Serum: 12 (10-05 @ 06:14)      Calcium, Total Serum: 8.7 (10-05 @ 06:14)          10-05    135  |  99  |  26<H>  ----------------------------<  135<H>  4.5   |  24  |  0.64    Ca    8.7      05 Oct 2022 06:14                          9.0    6.25  )-----------( 119      ( 06 Oct 2022 11:37 )             29.1     Medications  MEDICATIONS  (STANDING):  albuterol/ipratropium for Nebulization 3 milliLiter(s) Nebulizer every 6 hours  allopurinol 100 milliGRAM(s) Oral daily  anastrozole 1 milliGRAM(s) Oral daily  atorvastatin 20 milliGRAM(s) Oral at bedtime  bisacodyl 5 milliGRAM(s) Oral at bedtime  buDESOnide    Inhalation Suspension 0.5 milliGRAM(s) Inhalation every 12 hours  chlorhexidine 2% Cloths 1 Application(s) Topical <User Schedule>  dextrose 50% Injectable 25 Gram(s) IV Push once  dextrose 50% Injectable 25 Gram(s) IV Push once  dextrose 50% Injectable 12.5 Gram(s) IV Push once  dextrose 50% Injectable 25 Gram(s) IV Push once  dextrose Oral Gel 15 Gram(s) Oral once  digoxin     Tablet 125 MICROGram(s) Oral daily  diltiazem    Tablet 30 milliGRAM(s) Enteral Tube every 6 hours  glucagon  Injectable 1 milliGRAM(s) IntraMuscular once  guaiFENesin Oral Liquid (Sugar-Free) 300 milliGRAM(s) Oral every 6 hours  insulin lispro (ADMELOG) corrective regimen sliding scale   SubCutaneous every 6 hours  levETIRAcetam  Solution 750 milliGRAM(s) Oral two times a day  levothyroxine 75 MICROGram(s) Oral daily  melatonin 3 milliGRAM(s) Oral at bedtime  prednisoLONE    3 mG/mL Solution (ORAPRED)   Oral   rivaroxaban 20 milliGRAM(s) Oral with dinner  senna 2 Tablet(s) Oral at bedtime      Physical Exam  General: Patient comfortable in bed  Vital Signs Last 12 Hrs  T(F): 97.6 (10-06-22 @ 11:16), Max: 97.9 (10-06-22 @ 03:58)  HR: 74 (10-06-22 @ 12:00) (70 - 76)  BP: 139/82 (10-06-22 @ 12:00) (95/58 - 139/82)  BP(mean): --  RR: 18 (10-06-22 @ 11:16) (18 - 20)  SpO2: 100% (10-06-22 @ 11:16) (99% - 100%)  Neck: No palpable thyroid nodules.  CVS: S1S2, No murmurs  Respiratory: No wheezing, no crepitations  GI: Abdomen soft, bowel sounds positive  Musculoskeletal:  edema lower extremities.   Skin: No skin rashes, no ecchymosis    Diagnostics             Chief complaint  Patient is a 87y old  Female who presents with a chief complaint of SOB (06 Oct 2022 07:55)   Review of systems  Patient in bed, looks comfortable, no hypoglycemic episodes.    Labs and Fingersticks  CAPILLARY BLOOD GLUCOSE      POCT Blood Glucose.: 185 mg/dL (06 Oct 2022 11:56)  POCT Blood Glucose.: 124 mg/dL (06 Oct 2022 05:20)  POCT Blood Glucose.: 122 mg/dL (06 Oct 2022 00:22)  POCT Blood Glucose.: 188 mg/dL (05 Oct 2022 17:06)    Anion Gap, Serum: 12 (10-05 @ 06:14)      Calcium, Total Serum: 8.7 (10-05 @ 06:14)          10-05    135  |  99  |  26<H>  ----------------------------<  135<H>  4.5   |  24  |  0.64    Ca    8.7      05 Oct 2022 06:14                          9.0    6.25  )-----------( 119      ( 06 Oct 2022 11:37 )             29.1     Medications  MEDICATIONS  (STANDING):  albuterol/ipratropium for Nebulization 3 milliLiter(s) Nebulizer every 6 hours  allopurinol 100 milliGRAM(s) Oral daily  anastrozole 1 milliGRAM(s) Oral daily  atorvastatin 20 milliGRAM(s) Oral at bedtime  bisacodyl 5 milliGRAM(s) Oral at bedtime  buDESOnide    Inhalation Suspension 0.5 milliGRAM(s) Inhalation every 12 hours  chlorhexidine 2% Cloths 1 Application(s) Topical <User Schedule>  dextrose 50% Injectable 25 Gram(s) IV Push once  dextrose 50% Injectable 25 Gram(s) IV Push once  dextrose 50% Injectable 12.5 Gram(s) IV Push once  dextrose 50% Injectable 25 Gram(s) IV Push once  dextrose Oral Gel 15 Gram(s) Oral once  digoxin     Tablet 125 MICROGram(s) Oral daily  diltiazem    Tablet 30 milliGRAM(s) Enteral Tube every 6 hours  glucagon  Injectable 1 milliGRAM(s) IntraMuscular once  guaiFENesin Oral Liquid (Sugar-Free) 300 milliGRAM(s) Oral every 6 hours  insulin lispro (ADMELOG) corrective regimen sliding scale   SubCutaneous every 6 hours  levETIRAcetam  Solution 750 milliGRAM(s) Oral two times a day  levothyroxine 75 MICROGram(s) Oral daily  melatonin 3 milliGRAM(s) Oral at bedtime  prednisoLONE    3 mG/mL Solution (ORAPRED)   Oral   rivaroxaban 20 milliGRAM(s) Oral with dinner  senna 2 Tablet(s) Oral at bedtime      Physical Exam  General: Patient comfortable in bed  Vital Signs Last 12 Hrs  T(F): 97.6 (10-06-22 @ 11:16), Max: 97.9 (10-06-22 @ 03:58)  HR: 74 (10-06-22 @ 12:00) (70 - 76)  BP: 139/82 (10-06-22 @ 12:00) (95/58 - 139/82)  BP(mean): --  RR: 18 (10-06-22 @ 11:16) (18 - 20)  SpO2: 100% (10-06-22 @ 11:16) (99% - 100%)  Neck: No palpable thyroid nodules.  CVS: S1S2, No murmurs  Respiratory: No wheezing, no crepitations  GI: Abdomen soft, bowel sounds positive  Musculoskeletal:  edema lower extremities.   Skin: No skin rashes, no ecchymosis    Diagnostics

## 2022-10-06 NOTE — PROGRESS NOTE ADULT - SUBJECTIVE AND OBJECTIVE BOX
CARDIOLOGY     PROGRESS  NOTE   ________________________________________________    CHIEF COMPLAINT:Patient is a 87y old  Female who presents with a chief complaint of SOB (06 Oct 2022 07:06)  no complain  	  REVIEW OF SYSTEMS:  CONSTITUTIONAL: No fever, weight loss, or fatigue  EYES: No eye pain, visual disturbances, or discharge  ENT:  No difficulty hearing, tinnitus, vertigo; No sinus or throat pain  NECK: No pain or stiffness  RESPIRATORY: No cough, wheezing, chills or hemoptysis; + Shortness of Breath  CARDIOVASCULAR: No chest pain, palpitations, passing out, dizziness, or leg swelling  GASTROINTESTINAL: No abdominal or epigastric pain. No nausea, vomiting, or hematemesis; No diarrhea or constipation. No melena or hematochezia.  GENITOURINARY: No dysuria, frequency, hematuria, or incontinence  NEUROLOGICAL: No headaches, memory loss, loss of strength, numbness, or tremors  SKIN: No itching, burning, rashes, or lesions   LYMPH Nodes: No enlarged glands  ENDOCRINE: No heat or cold intolerance; No hair loss  MUSCULOSKELETAL: No joint pain or swelling; No muscle, back, or extremity pain  PSYCHIATRIC: No depression, anxiety, mood swings, or difficulty sleeping  HEME/LYMPH: No easy bruising, or bleeding gums  ALLERGY AND IMMUNOLOGIC: No hives or eczema	    [ ] All others negative	  [x ] Unable to obtain    PHYSICAL EXAM:  T(C): 36.6 (10-06-22 @ 03:58), Max: 37 (10-05-22 @ 15:40)  HR: 76 (10-06-22 @ 03:58) (70 - 86)  BP: 95/58 (10-06-22 @ 03:58) (89/52 - 116/61)  RR: 20 (10-06-22 @ 03:58) (18 - 22)  SpO2: 99% (10-06-22 @ 03:58) (93% - 100%)  Wt(kg): --  I&O's Summary    05 Oct 2022 07:01  -  06 Oct 2022 07:00  --------------------------------------------------------  IN: 0 mL / OUT: 350 mL / NET: -350 mL        Appearance: Normal	  HEENT:   Normal oral mucosa, PERRL, EOMI	  Lymphatic: No lymphadenopathy  Cardiovascular: Normal S1 S2, No JVD, + murmurs, No edema  Respiratory: Lungs clear to auscultation	  Psychiatry: A & O x 3, Mood & affect appropriate  Gastrointestinal:  Soft, Non-tender, + BS	  Skin: No rashes, No ecchymoses, No cyanosis	  Neurologic: Non-focal  Extremities: Normal range of motion, No clubbing, cyanosis or edema  Vascular: Peripheral pulses palpable 2+ bilaterally    MEDICATIONS  (STANDING):  albuterol/ipratropium for Nebulization 3 milliLiter(s) Nebulizer every 6 hours  allopurinol 100 milliGRAM(s) Oral daily  anastrozole 1 milliGRAM(s) Oral daily  atorvastatin 20 milliGRAM(s) Oral at bedtime  bisacodyl 5 milliGRAM(s) Oral at bedtime  buDESOnide    Inhalation Suspension 0.5 milliGRAM(s) Inhalation every 12 hours  chlorhexidine 2% Cloths 1 Application(s) Topical <User Schedule>  dextrose 50% Injectable 25 Gram(s) IV Push once  dextrose 50% Injectable 25 Gram(s) IV Push once  dextrose 50% Injectable 12.5 Gram(s) IV Push once  dextrose 50% Injectable 25 Gram(s) IV Push once  dextrose Oral Gel 15 Gram(s) Oral once  digoxin     Tablet 125 MICROGram(s) Oral daily  diltiazem    Tablet 30 milliGRAM(s) Enteral Tube every 6 hours  glucagon  Injectable 1 milliGRAM(s) IntraMuscular once  guaiFENesin Oral Liquid (Sugar-Free) 300 milliGRAM(s) Oral every 6 hours  insulin lispro (ADMELOG) corrective regimen sliding scale   SubCutaneous every 6 hours  insulin NPH human recombinant 7 Unit(s) SubCutaneous every 6 hours  levETIRAcetam  Solution 750 milliGRAM(s) Oral two times a day  levothyroxine 75 MICROGram(s) Oral daily  melatonin 3 milliGRAM(s) Oral at bedtime  prednisoLONE    3 mG/mL Solution (ORAPRED)   Oral   rivaroxaban 20 milliGRAM(s) Oral with dinner  senna 2 Tablet(s) Oral at bedtime      TELEMETRY: 	    ECG:  	  RADIOLOGY:  OTHER: 	  	  LABS:	 	    CARDIAC MARKERS:                                7.3    7.15  )-----------( 136      ( 05 Oct 2022 06:14 )             25.2     10-05    135  |  99  |  26<H>  ----------------------------<  135<H>  4.5   |  24  |  0.64    Ca    8.7      05 Oct 2022 06:14      proBNP: Serum Pro-Brain Natriuretic Peptide: 461 pg/mL (09-08 @ 20:56)    Lipid Profile:   HgA1c:   TSH: Thyroid Stimulating Hormone, Serum: 0.32 uIU/mL (09-24 @ 11:34)  Thyroid Stimulating Hormone, Serum: 0.30 uIU/mL (09-24 @ 11:34)          Assessment and plan  ---------------------------    86F w/ extensive hx including severe tracheobronchomalacia (c/b hypoxia 2/2 mucous plugging and recurrent L lung collapse), 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 Very limited hx obtained from pt given mental status, dyspnea, and use of BIPAP. Unable to reach sonSy. History obtained from staff/chart review  Of note, pt has multiple recent admissions with similar presentation. Most recently was hospitalized from 8/28/22-9/8/22 for SOB, treated with airway clearance and completed 5-day course of Zosyn for empiric coverage of PNA (though per Pulm, unlikely to have PNA). Course c/b PEG displacement and subsequent reinsertion by IR on 8/30. In addition to her usual PEG feeds, pt was also started on puree diet with moderately thickened fluids for pleasure feeds. Also was started on anastrozole for most likely dx of metastatic breast cancer to bones (pt was unable to tolerate further cancer workup of spine lesions). Pt remained full code during recent admissions.  pt with metastatic ca to the bone with sob sec to obesity and underlying lung and cardiac disease  increase sob sec to missing BiPAP at night  may consider hospice care/ palliative care  pt with bone mets, no further nieto/ onc noted  on steroid as per pulmonary  a.fib on 09/23/increase Cardizem dose, will add digoxin  may increase Xarelto dose sec to a.fib  add digoxin/converted to NSR  change dig to po  remained in nsr, dc tele pt non compliant   will increase Cardizem dose as tolerated  palliative care  pulmonary fu, sterid taper  dc planning as clear by pulmonary  continue current meds  decrease hgb ?sec AC pt on full AC sec to PAF, repeat cbc

## 2022-10-06 NOTE — PROGRESS NOTE ADULT - ASSESSMENT
Assessment  DMT2: 87y Female with DM T2 with hyperglycemia, A1C 7%, on NPH insulin and coverage, NPH dose consistently being held, blood sugars in overall acceptable range, no new  hypoglycemic episodes, on peg tube feeds, prednisolone taper in progress.  Covid: on medications, stable, monitored.  Hypothyroidism: On Synthroid 75 mcg po daily, ?compliance, euthyroid.  HLD: on statin.        Jeanie Gao MD  Cell: [phone]  Office: [phone]               Assessment  DMT2: 87y Female with DM T2 with hyperglycemia, A1C 7%, on NPH insulin and coverage, NPH dose consistently being held, blood sugars in overall acceptable range, no  new  hypoglycemic episodes, on peg tube feeds, prednisolone taper in progress.  Covid: on medications, stable, monitored.  Hypothyroidism: On Synthroid 75 mcg po daily, ?compliance, euthyroid.  HLD: on statin.        Jeanie Gao MD  Cell: [phone]  Office: [phone]

## 2022-10-06 NOTE — PROGRESS NOTE ADULT - ASSESSMENT
89    year old female    h/o hemorrhagic CVA, has  PEG,  HTN, MI,   HLD, gout, right ca  breast   right Ca breast. s/p mastectomy in 2019,  s/p  sentinel node  localization.  4/4,  were  negative  s/p rrt  . in past,  for hypoxia. cxr with complete  collapsed  of  left lung, needing broch,   s p  bronchoscopy , pt  with  tracheomalacia  and  collapsed  airways,  makes  this a  difficult  situation, as there is no good rx for this     h/o bacteremia. on   pna, perforated  appendicitis,  ?  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 this will not alter rx. pt  redvd  extended  course  of ab   on iv  vanco and ertapenem.  till  2/9/.22.        *  admitted with   presumed  resp  failure/ pt was  sent back from [facility], the same day   pt seen in er.  appears   at her naseline.  no  wheezing /  atousable    ENT eval w/ laryngoscopy notable for vocal cords mobile and intact, no edema, upper airway sherwood  *   s/p cva, has  PEG,  npo,  on  synthroid, Keppra  ,  *   HTN, on  cardizem,  per  card dr jamison  *     h/o  Ca breast. /, s/p  R  mastectomy  *   obesity, bmi  was   41, now  is  30   *    c/c left  leg contracture ,  remains  bed  bound. functional  paraplegia,  needs  assistance  for  all her  adl's   *  pt  with  h/o  tracheomalacia  and  collapsed  airways, propensity  for  lung collpse,   pt is  at risk for  re admissions     on nocturnal  bipap,  difficult  situation, a s there  is no  good  rx  for  pt's  recurrent lung collapse hypoxia     h/o  of  chronic   mild  tachycardia      *   CT chest. lytic  lesions, T  spine/  ribs/ humerus/  oncoloigy  d r ohri.  suspect  metastatic ca breast          son. guicho Dan/ oncologist  has  also  discussed  with  son,  futility of  pursuing  bx. a s pt  is  not an ideal   candidate  for    chemo// CT  A.p ,/ prelim ,  pelvic  mets  per  oncology,   suspect  metastatic ca  breast,  was  awaiting   mri  spine/  pt unable  to tolerate  mri   per oncology, no  further  intervention/  and on  Arimidex, now,   per d r ohri   obesity,  bed bound.  functional paraplegia    per  son,   rehab promptly  returned   to er/ as they  did not  have  a  bipap machine /  care cortn to f/.p, needs  24/7  O2/ nocturnal bipap   difficult  situation,  bed  bound. obesity. c/c  hypercarbia, needing , prn /  nocturnal bipap/  with intermittent tachypnea    now  is  covid +. on iv steroid/ was  wheezing/ remdissivir/, seen by  house  ID   pt  with  frequent episodes  of tachypnea. has  obstructive /restrictive   lung  disease ,  obesity/ RAQUEL/ hypercarbia   called son, caseyple  times,  message  left/    pt  remains  at  her  baseline ,   verbal  at  times  Afib, on xarelto  20m mg qd    s/p tachycardia, on   cardizem  and digoxin/  remains on iv steroid  per  pulm    pt  with poor  access   and  IR  note  seen, pt unable to lie  supine   pt at  baseline,  has  never  been  able  to  lie  supine,  given all  her  co  morbidities,   and pt  has  been  optimized   to  fullest  degree possible     on   prednisolone,  via  peg  pt 's  situation is tenuous   there  ie  no  room  for further    intervention. , CT  chest  prelim, atelectasis  pt  with  ongoing intermittent tachycardia/  worsening  tachypnea/    s/p  rrt on 9/30  for tachypnea   pt  with  tachypnea  mostly,  bed  bound,  hence  difficult  to  initiate   any   d/c  planning   son remains  unavailable, lately   pt is  ideal  for palliative care   requested  palliative   care team  to  assist with  goc. at  this  juncture, given pt;s  condition, difficult  to initiaite   d/c planning,  given  her  unstable condition   hb is  7/  prbc.  cbc pending  today/  seen by  pallaitive care on 10/ 5/  pt  remains  full  code    per  son,.  from before  pt is  full code

## 2022-10-06 NOTE — PROGRESS NOTE ADULT - ASSESSMENT
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 She also has a history of HTN, HLD, CAD s/p MI with preserved LVEF and moderate aortic stenosis. The patient was admitted to [facility] in December 2021 with fever and abdominal pain due to perforated appendicitis. She was treated with tube drainage and antibiotics for a polymicrobial infection. Hospital course was complicated by respiratory failure due to mucous plugging with complete collapse of the left lung. She underwent several bronchoscopies for pulmonary toilet and was found to have severe tracheobronchomalacia. Her respiratory status has remained "stable" while at [facility] on nebulized bronchodilators and hypertonic saline and without nocturnal NIV. She was admitted in March with hematochezia. The left lower lobe was well aerated on a CT scan of the abdomen and pelvis with only bibasilar subsegmental atelectasis - there was scattered sigmoid diverticulosis without evidence of diverticulitis - interval decrease in size of a fluid collection in the region of the previously perforated appendicitis measuring 2.8 x 1.5 cm previously measuring 4.0 x 2.2 cm - slight increase in mild adjacent inflammatory change. The GI bleed was treated conservatively.  She was admitted on 8/29 with shortness of breath in the setting of back, neck and left lower extremity pain. She required NIV for mild acute hypercapnic respiratory failure that quickly resolved. Her respiratory status remained stable on room air and on her usual nebs and mucolytics. She was started on a puree diet with moderately thickened fluids in addition to PEG feeds following evaluation by the speech and swallow team. She was started on anastrazole for likely metastatic breast cancer to the bone. On the day of discharge, the patient was quite anxious with shortness of breath, chest congestion and wheeze - she was making a grunting noise with expiration. She was returned from [facility] that evening. Currently she is tachypneic and using accessory muscles of respiration. She has an audible wheeze and cough productive of scant sputum. No fevers, chills or sweats. No chest pain/pressure or palpitations.     the patient has new onset bronchospasm with increased work of breathing and worsening left lower lobe atelectasis -  she has severe tracheobronchomalacia that has resulted in mucous plugging with complete left lung collapse requiring several bronchoscopies for pulmonary toilet in the past - the patient was felt not to be a candidate for surgery for the tracheobronchomalacia and stenting was felt to have more risk than benefit     metastatic breast cancer    9/19 -  found to be COVID positive on discharge [facility]; awake and alert; now with marked worsening of shortness of breath and using her accessory muscles of respiration; severe chest congestion and wheeze exacerbated by neck flexion; increasing pCO2, WBC and lactate; occasional cough productive of scant sputum; no fevers, chills or sweats; no chest pain/pressure or palpitations; back on PEG feeds; switched from nebs to inhalers yesterday as Dr. Dexter was informed by nursing that COVID positive patients cannot receive nebs at [facility]    9/21 - spent the day on AVAPS -> removed this AM with somewhat less increased work of breathing and decreased chest congestion and wheeze; VBG not sent this AM; in airborne and contact isolation due to hospital acquired COVID infection; awake and alert; occasional cough productive of scant sputum; no fevers, chills or sweats; no chest pain/pressure or palpitations;     9/23 - off AVAPS this AM - laying almost flat in bed with neck and back flexed; increased work of breathing; in airborne and contact isolation due to hospital acquired COVID infection; awake and alert; begging for lemonade; occasional cough productive of scant sputum; improving chest congestion or wheeze; no fevers, chills or sweats; no chest pain/pressure or palpitations; receiving PEG feeds when off NIV;    9/26 -  again laying almost flat in bed with neck and back flexed; decreased work of breathing now on a nasal canula during the day and AVAPS at night; airborne and contact isolation being discontinued after treatment for a hospital acquired COVID infection; awake and alert asking for something to eat and drink; occasional cough productive of scant sputum; improving chest congestion and wheeze; no fevers, chills or sweats; no chest pain/pressure or palpitations; receiving PEG feeds    9/30 - hoisted up in bed earlier this AM - neck flexed onto the anterior chest wall; increased tachypnea without hypoxemia on a 3lpm nasal canula today; no cough, sputum production, hemoptysis, chest congestion or wheeze; no fevers, chills or sweats; no chest pain/pressure or palpitations; tolerating ICE chips.     10/4 - speaking in full sentences; wants to go back to [place]; flat in bed; neck flexed onto the anterior chest wall; looks enormously uncomfortable with tachypnea and increased work of breathing; no cough, sputum production, hemoptysis, chest congestion or wheeze; no fevers, chills or sweats; no chest pain/pressure or palpitations; asking for ICE chips.     10/6 - discussions are ongoing about goals of care with the patient's children; the patient's son does not want us to give up and suggests getting a different bed so that she will not slide down; off NIV for only 45 minutes yesterday; flat in bed; neck flexed onto the anterior chest wall; no cough, sputum production, hemoptysis, chest congestion or wheeze; no fevers, chills or sweats; no chest pain/pressure or palpitations;      PLAN/RECOMMENDATIONS:    the patient's head needs to be elevated greater than 45 degrees at all times - unfortunately her neck is flexed downward and rests on her anterior chest obstructing her upper airway - she continues to slide down in bed limiting her diaphragmatic excursion  repeat chest CT -> patent central airways - stable subsegmental atelectasis within posterior upper lobes, lingula and basilar lower lobes - trace fluid within the left fissure - no significant pleural effusion or pneumothorax - new T8 compression deformity - redemonstration of T2 and T3 compression fractures - unchanged mild T11 wedge deformity - redemonstration of multiple lytic lesions in the thoracic spine - fixation plate and screws within the left proximal humerus - status post right-sided mastectomy  oxygen supplementation via a nasal canula during the day to keep saturation greater than 92% as able  currently on BIPAP due to increased work of breathing -> placed on a 3lpm nasal canula  replace on BIPAP for increased work of breathing, resistant hypoxemia and/or respiratory acidosis  VBG 9/27 -> 7.42/45/61/90.4% - resolved respiratory acidosis -> repeat  has completed a 5 day course of remdesivir  prednisolone taper as written - will need to adjust insulin accordingly  continue albuterol/atrovent nebs q6h  continue pulmicort 0.5mg nebs q12h  continue guaifenesin 300mg 4 times daily via PEG  incentive spirometry   PEG feeds - see recommendations by nutrition service  CT scan 8/31 -> multiple lytic lesions are seen throughout the axial and appendicular skeleton, some of which appear increased in size from CT abdomen pelvis 3/16/2022 when evaluated in retrospect - a few lytic lesions involve the left intertrochanteric region and right acetabulum  oncology follow-up noted    s/p right simple mastectomy 4/2019 for breast cancer but did not receive adjuvant hormonal therapy    it is likely that the patient has metastatic breast cancer    started on anastrozole as the patient could not tolerate a MRI and is not a candidate for bone biopsy  cardiology follow-up for management of HTN, HLD, CAD, aortic stenosis and atrial fibrillation     cardiac meds: digoxin/diltiazem/xarelto  DVT prophylaxis - xarelto  glucose control  bowel regimen  analgesics  keppra    Will follow with you. Plan of care discussed with the patient at bedside, the dedicated floor NP and the patient's son Sy Galloway by phone. The patient's respiratory status remains tenuous and she will unlikely be able to be discharged back to [facility] - her children are very unrealistic - palliative care evaluation noted    Kennedy Marcano MD, [facility]  [phone]  Pulmonary Medicine

## 2022-10-06 NOTE — PROGRESS NOTE ADULT - SUBJECTIVE AND OBJECTIVE BOX
[facility] - PROGRESS NOTE    CHIEF COMPLAINT: COVID infection; chronic hypoxic/hypercapnic respiratory failure; mucous plugging; atelectasis; tracheobronchomalacia; bronchospasm; weak cough; 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; PEG in place    INTERVAL HISTORY: discussions are ongoing about goals of care with the patient's children; the patient's son does not want us to give up and suggests getting a different bed so that she will not slide down; off NIV for only 45 minutes yesterday; flat in bed; neck flexed onto the anterior chest wall; no cough, sputum production, hemoptysis, chest congestion or wheeze; no fevers, chills or sweats; no chest pain/pressure or palpitations;    REVIEW OF SYSTEMS:  Constitutional: As per interval history  HEENT: Within normal limits  CV: As per interval history  Resp: As per interval history  GI: dysphagia  : Within normal limits  Musculoskeletal: Within normal limits  Skin: Within normal limits  Neurological: CVA - left sided plegia  Psychiatric: Within normal limits  Endocrine: Within normal limits  Hematologic/Lymphatic: Within normal limits  Allergic/Immunologic: Within normal limits    MEDICATIONS:     Pulmonary "  albuterol/ipratropium for Nebulization 3 milliLiter(s) Nebulizer every 6 hours  buDESOnide    Inhalation Suspension 0.5 milliGRAM(s) Inhalation every 12 hours  guaiFENesin Oral Liquid (Sugar-Free) 300 milliGRAM(s) Oral every 6 hours    Anti-microbials:    Cardiovascular:  digoxin     Tablet 125 MICROGram(s) Oral daily  diltiazem    Tablet 30 milliGRAM(s) Enteral Tube every 6 hours    Other:  allopurinol 100 milliGRAM(s) Oral daily  anastrozole 1 milliGRAM(s) Oral daily  atorvastatin 20 milliGRAM(s) Oral at bedtime  bisacodyl 5 milliGRAM(s) Oral at bedtime  chlorhexidine 2% Cloths 1 Application(s) Topical <User Schedule>  dextrose 50% Injectable 25 Gram(s) IV Push once  dextrose 50% Injectable 25 Gram(s) IV Push once  dextrose 50% Injectable 12.5 Gram(s) IV Push once  dextrose 50% Injectable 25 Gram(s) IV Push once  dextrose Oral Gel 15 Gram(s) Oral once  glucagon  Injectable 1 milliGRAM(s) IntraMuscular once  insulin lispro (ADMELOG) corrective regimen sliding scale   SubCutaneous every 6 hours  insulin NPH human recombinant 7 Unit(s) SubCutaneous every 6 hours  levETIRAcetam  Solution 750 milliGRAM(s) Oral two times a day  levothyroxine 75 MICROGram(s) Oral daily  melatonin 3 milliGRAM(s) Oral at bedtime  prednisoLONE    3 mG/mL Solution (ORAPRED)   Oral   rivaroxaban 20 milliGRAM(s) Oral with dinner  senna 2 Tablet(s) Oral at bedtime    MEDICATIONS  (PRN):  acetaminophen     Tablet .. 650 milliGRAM(s) Oral every 6 hours PRN Temp greater or equal to 38C (100.4F), Mild Pain (1 - 3)  ALPRAZolam 0.25 milliGRAM(s) Oral every 8 hours PRN anxiety  polyethylene glycol 3350 17 Gram(s) Oral daily PRN Constipation        OBJECTIVE:    POCT Blood Glucose.: 185 mg/dL (06 Oct 2022 11:56)  POCT Blood Glucose.: 124 mg/dL (06 Oct 2022 05:20)  POCT Blood Glucose.: 122 mg/dL (06 Oct 2022 00:22)  POCT Blood Glucose.: 188 mg/dL (05 Oct 2022 17:06)    PHYSICAL EXAM:       ICU Vital Signs Last 24 Hrs  T(C): 36.4 (06 Oct 2022 11:16), Max: 37 (05 Oct 2022 15:40)  T(F): 97.6 (06 Oct 2022 11:16), Max: 98.6 (05 Oct 2022 15:40)  HR: 74 (06 Oct 2022 12:00) (70 - 86)  BP: 139/82 (06 Oct 2022 12:00) (93/45 - 139/82)  BP(mean): --  ABP: --  ABP(mean): --  RR: 18 (06 Oct 2022 11:16) (18 - 22)  SpO2: 100% (06 Oct 2022 11:16) (93% - 100%) on 30% BIPAP     General: Awake. Alert. Cooperative. Appears stated age. Obese. Laying flat in bed. BIPAP  HEENT: Atraumatic. Normocephalic. Anicteric. Normal oral mucosa. PERRL. EOMI. Mallampati class IV airway. Poor dentition. Full face mask  Neck: Supple. Trachea midline. Thyroid without enlargement/tenderness/nodules. No carotid bruit. No JVD. Short and wide. Neck flexed with chin resting on her anterior chest.  Cardiovascular: Regular rate and rhythm. S1 S2 normal. III/VI systolic murmur  Respiratory: Respirations unlabored. Clear anteriorly. Kyphosis. Poor chest wall excursion  Abdomen: Soft. Non-tender. Non-distended. No organomegaly. No masses. Normal bowel sounds. Obese. PEG.  Extremities: Warm to touch. No clubbing or cyanosis. No pedal edema. Large legs. Left leg contracted under the right leg  Pulses: 2+ peripheral pulses all extremities.	  Skin: Normal skin color. No rashes or lesions. No ecchymoses. No cyanosis. Warm to touch.  Lymph Nodes: Cervical, supraclavicular and axillary nodes normal  Neurological: Left lower extremity plegia with contraction. Left upper extremity is quite weak. A and O x 3  Psychiatry: Appropriate mood and affect.    LABS:                          9.0    6.25  )-----------( 119      ( 06 Oct 2022 11:37 )             29.1     CBC    WBC  6.25 <==, 7.15 <==, 8.39 <==, See Note <==, QNS <==    Hemoglobin  9.0 <<==, 7.3 <<==, 7.9 <<==, See note <<==, See note <<==    Hematocrit  29.1 <==, 25.2 <==, 26.4 <==, See note <==, See note <==    Platelets  119 <==, 136 <==, 140 <==, See note <==, QNS <==      135  |  99  |  26<H>  ----------------------------<  135<H>    10-05  4.5   |  24  |  0.64      LYTES    sodium  135 <==, 136 <==, 139 <==, 145 <==, 151 <==    potassium   4.5 <==, 5.1 <==, 4.6 <==, 4.9 <==, 4.3 <==    chloride  99 <==, 99 <==, 103 <==, 107 <==, 114 <==    carbon dioxide  24 <==, 23 <==, 24 <==, 20 <==, 20 <==    =============================================================================================  RENAL FUNCTION:    Creatinine:   0.64  <<==, 0.73  <<==, 0.76  <<==, 0.79  <<==, 0.63  <<==    BUN:   26 <==, 34 <==, 37 <==, 37 <==, 35 <==    ============================================================================================    calcium   8.7 <==, 8.4 <==, 8.8 <==, 8.6 <==, 8.7 <==    ===========================================================================================  LFTs    AST:   24 <==     ALT:  20  <==     AP:  201  <=    Bili:  0.5  <=    PT/INR - ( 24 Sep 2022 11:34 )   PT: 11.3 sec;   INR: 0.98 ratio      Venous Blood Gas:  09-27 @ 11:15  7.42/45/61/29/90.4  VBG Lactate: 2.3    ABG - ( 21 Sep 2022 14:19 )  pH: 7.51  /  pCO2: 33    /  pO2: 158   / HCO3: 26    / Base Excess: 3.6   /  SaO2: 100.0     Venous Blood Gas:  09-19 @ 09:30  7.34/53/50/29/80.0  VBG Lactate: 3.5    Venous Blood Gas:  09-18 @ 03:50  7.39/48/47/29/78.2  VBG Lactate: 1.7    Procalcitonin, Serum: 0.23 ng/mL (09-18 @ 09:59)    D-Dimer Assay, Quantitative (09.18.22 @ 09:59)   D-Dimer Assay, Quantitative: 196:    C-Reactive Protein, Serum (09.18.22 @ 09:59)   C-Reactive Protein, Serum: 5 mg/L     Ferritin, Serum in AM (09.18.22 @ 09:59)   Ferritin, Serum: 63 ng/mL     < from: TTE with Doppler (w/Cont) (01.21.22 @ 14:08) >    Patient name: FRANKI MEHTA  YOB: 1935   Age: 86 (F)   MR#: [id number]  Study Date: 1/21/2022  ------------------------------------------------------------------------  Dimensions:    Normal Values:  LA:     2.5    2.0 - 4.0 cm  Ao:     3.3    2.0 - 3.8 cm  SEPTUM: 1.6    0.6 - 1.2 cm  PWT:    0.9    0.6 - 1.1 cm  LVIDd:  3.6    3.0 - 5.6 cm  LVIDs:  2.5    1.8 - 4.0 cm  Derived variables:  LVMI: 77 g/m2  RWT: 0.50  Fractional short: 31 %  EF (Visual Estimate): 70-75 %  ------------------------------------------------------------------------  Conclusions:  1. Concentric left ventricular hypertrophy.  2. Hyperdynamic left ventricular systolic function.  Endocardial visualization enhanced with intravenous  injection of Ultrasonic Enhancing Agent (Definity).  3. The right ventricle is not well visualized; grossly  normal right ventricular systolic function.  Pt refused to proceed with exam.  Limited Doppler study.  No trans aortic gradients.  Unable to rule out endocarditis.  ------------------------------------------------------------------------  Confirmed on 1/21/2022 - 15:42:57 by COMPA Castillo  ------------------------------------------------------------------------  ---------------------------------------------------------------------------------------------------------------    MICROBIOLOGY:     COVID-19 PCR . (09.19.22 @ 18:31)   COVID-19 PCR: Detected:    Respiratory Viral Panel with COVID-19 by RYAN (09.08.22 @ 21:38)   Rapid RVP Result: [facility]   SARS-CoV-2: [facility]  This Respiratory Panel uses polymerase chain reaction (PCR) to detect for   adenovirus; coronavirus (HKU1, NL63, 229E, OC43); human metapneumovirus   (hMPV); human enterovirus/rhinovirus (Entero/RV); influenza A; influenza   A/H1; influenza A/H3; influenza A/H1-2009; influenza B; parainfluenza   viruses 1, 2, 3, 4; respiratory syncytial virus; Mycoplasma pneumoniae;   Chlamydophila pneumoniae; and SARS-CoV-2.     Culture - Blood (09.08.22 @ 20:39)   Specimen Source: .Blood Blood-Peripheral   Culture Results:   No growth to date.     Culture - Blood (09.08.22 @ 20:25)   Specimen Source: .Blood Blood-Peripheral   Culture Results:   No growth to date.     RADIOLOGY:  [x ] Chest radiographs reviewed and interpreted by me     EXAM:  CT CHEST                          PROCEDURE DATE:  09/28/2022      FINDINGS:    AIRWAYS, LUNGS and PLEURA: Patent central airways. Stable subsegmental   atelectasis within posterior upper lobes, lingula and basilar lower   lobes. Trace fluid within the left fissure. Otherwise, no significant   pleural effusion or pneumothorax.    MEDIASTINUM AND GIOVANA: No lymphadenopathy.    HEART AND VESSELS: Heart size is normal. No pericardial effusion.   Thoracic aorta and pulmonary artery normal in diameter. Calcifications of   the coronary arteries and aorta.    VISUALIZED UPPER ABDOMEN: Within normal limits.    CHEST WALL AND BONES: New T8 compression deformity. Redemonstration of T2   and T3 compression fractures. Unchanged mild T11 wedge deformity.   Redemonstration of multiple lytic lesions in the thoracic spine,   unchanged from 9/14/2022. Fixation plate and screws within the left   proximal humerus. Status post right-sided mastectomy.    LOWER NECK: Within normal limits.    MISCELLANEOUS: Near-complete opacification of the right maxillary sinus.   Mucosal thickening of the left maxillary sinus.    IMPRESSION:    The airways are patent without mucoid impaction.    Mild bilateral areas of subsegmental atelectasis. The lungs are otherwise   clear.    Diffuse lytic bone lesions are again noted. Mild-moderate loss of body   height of T8 vertebral body is new from prior exam.    ADE CAMACHO MD; Resident Radiologist  This document has been electronically signed.  GIANNI MANTILLA MD; Attending Radiologist  This document has been electronically signed. Sep 29 2022 10:50AM  ---------------------------------------------------------------------------------------------------------------    EXAM:  XR CHEST PORTABLE URGENT 1V                          PROCEDURE DATE:  09/19/2022      FINDINGS:  Radiographic interpretation is limited by patient positioning and   overlying artifact.  Gastrostomy tube is noted.  Left humeral fixation plate and screws are noted.  The heart size cannot be accurately assessed in this projection.  Left basilar hazy opacities  Low lung volumes bilaterally.  There is no pneumothorax or large pleural effusion.  No acute bony abnormality.    IMPRESSION:  Left basilar hazy opacity may represent atelectasis.    AMY JARAMILLO MD; Resident Radiologist  This document has been electronically signed.  DIANE CLARK MD; Attending Radiologist  This document has been electronically signed. Sep 19 2022  6:08PM  --------------------------------------------------------------------------------------------------------------  EXAM:  CT CHEST                          PROCEDURE DATE:  09/14/2022      FINDINGS:    LUNGS AND AIRWAYS: Patent central airways.  Subsegmental atelectasis is   seen in the bilateral upper and lower lobes.  PLEURA: No pleural effusion.  MEDIASTINUM AND GIOVANA: No lymphadenopathy.  VESSELS: Calcifications of the aorta, aortic valve and coronary arteries.  HEART: Heart size is normal. No pericardial effusion.  CHEST WALL AND LOWER NECK: Within normal limits.  VISUALIZED UPPER ABDOMEN: Cholelithiasis.  BONES: Multiple lytic bone lesions are again seen including a destructive   lesion at the level of the T5 vertebral body that may extend into the   left neural foramen (3:42), unchanged from prior CT chest.    IMPRESSION:  Subsegmental atelectasis in the bilateral upper and lower lobes.    Lytic bone lesions as described above, unchanged from prior CT chest   8/29/2022. Recommend MR thoracic spine for further evaluation.     ALEXANDRIA COLLINS MD; Resident Radiologist  This document has been electronically signed.  HAY IRVIN MD; Attending Radiologist  This document has been electronically signed. Sep 14 2022  3:23PM  ---------------------------------------------------------------------------------------------------------------  EXAM:  DUPLEX SCAN EXT VEINS LOWER BI                          PROCEDURE DATE:  09/10/2022      IMPRESSION:  Limited study  No evidence of deep venous thrombosis in either lower extremity.  Limited visualization of the calf veins.    KEYLA ROMERO MD; Attending Radiologist  This document has been electronically signed. Sep 10 2022 10:42AM  ---------------------------------------------------------------------------------------------------------------  EXAM:  CT ABDOMEN AND PELVIS IC                          PROCEDURE DATE:  08/31/2022      IMPRESSION:    Multiple lytic lesions are seen throughout the axial and appendicular   skeleton, some of which appear increased in size from CT abdomen pelvis   3/16/2022 when evaluated in retrospect. A few lytic lesions involve the   left intertrochanteric region and right acetabulum.    Endometrial thickening. Recommend dedicated pelvic sonogram.    Cystic lesions are seen within the pancreas. Recommend a dedicated MRI on   a nonemergent basis.    SULY KENDRICK MD; Resident Radiology  This document has been electronically signed.  BRITTANI MALCOLM MD; Attending Radiologist  This document has been electronically signed. Sep  1 2022 11:01AM  ---------------------------------------------------------------------------------------------------------------  EXAM:  XR HUMERUS MIN 2 VIEWS RT                          PROCEDURE DATE:  08/30/2022      EXAM:  Internal/external rotation AP right humerus from 8/30/2022 at 0927.   Compared to appearance on previous day chest CT.    IMPRESSION:  Generalized osteopenia. Redemonstrated focal lytic lesion in proximal   medial humeral diaphysis with small area of permeative cortical   destruction. No additional radiographically appreciated suspicious lytic   or blastic lesions in remaining imaged regions.    No current fractures or dislocations.    Preserved shoulder and elbow joint spaces.    IV cannula with attached tubing overlies upper arm.    MINE NICE MD; Attending Radiologist  This document has been electronically signed. Aug 30 2022 10:39AM  ---------------------------------------------------------------------------------------------------------------  EXAM:  DUPLEX EXT VEINS UPPER LT                          PROCEDURE DATE:  09/01/2022      IMPRESSION:  No evidence of left upper extremity deep venous thrombosis.    FAWN FITZGERALD MD; Attending Radiologist  This document has been electronically signed. Sep  1 2022  1:10PM  ---------------------------------------------------------------------------------------------------------------

## 2022-10-07 NOTE — PROGRESS NOTE ADULT - NS ATTEND AMEND GEN_ALL_CORE FT
Patient seen and examined today at bedside. Chart, labs, vitals, radiology reviewed. Above H&P reviewed and edited where appropriate. Agree with history and physical exam. Agree with assessment and plan. I reviewed the overnight course of events and discussed the care with the patient and their family  35 minutes spent on total encounter of which more than fifty percent of the encounter was spent counseling and/or coordinating care by the attending physician.

## 2022-10-07 NOTE — PROGRESS NOTE ADULT - PROBLEM SELECTOR PROBLEM 2
Hypothyroid

## 2022-10-07 NOTE — PROGRESS NOTE ADULT - ASSESSMENT
Assessment  DMT2: 87y Female with DM T2 with hyperglycemia, A1C 7%, NPH DC'd, now on insulin coverage, blood sugars trending up and not at target, no new hypoglycemic episodes, on peg tube feeds, prednisolone taper in progress, has poor prognosis.  Covid: on medications, stable, monitored.  Hypothyroidism: On Synthroid 75 mcg po daily, ?compliance, euthyroid.  HLD: on statin.        Jeanie Gao MD  Cell: [phone]  Office: [phone]               Assessment  DMT2: 87y Female with DM T2 with hyperglycemia, A1C 7%, NPH DC'd, now on insulin coverage, blood sugars trending up and not at target,  no new hypoglycemic episodes, on peg tube feeds, prednisolone taper in progress, has poor prognosis.  Covid: on medications, stable, monitored.  Hypothyroidism: On Synthroid 75 mcg po daily, ?compliance, euthyroid.  HLD: on statin.        Jeanie Gao MD  Cell: [phone]  Office: [phone]

## 2022-10-07 NOTE — PROGRESS NOTE ADULT - PROBLEM SELECTOR PROBLEM 1
DM (diabetes mellitus)

## 2022-10-07 NOTE — CHART NOTE - NSCHARTNOTESELECT_GEN_ALL_CORE
Event Note
GOC/Event Note
Nutrition Services
-  Elevated Lactate/Event Note
CICU Accept Note/Event Note
Event Note
Expiration Note/Event Note
GOC/Event Note
Nutrition Services
Nutrition Services
Tachypnea RR 30s/Event Note

## 2022-10-07 NOTE — PROGRESS NOTE ADULT - PROBLEM SELECTOR PLAN 1
Will continue NPH and continue insulin coverage. Will continue monitoring blood sugars and FU.  Suggest to hold NPH dose if off tube feeds.  Suggest DC on current insulin regimen, FU 4 weeks.
Will increase to NPH 10u q6 and continue insulin coverage. Will continue monitoring blood sugars and FU.  Suggest to hold NPH dose if off tube feeds.  Suggest DC on current insulin regimen, FU 4 weeks.
Will continue NPH 7u q6 and continue coverage. Will continue monitoring  blood sugars, will Follow up.  Suggest to hold NPH dose if off tube feeds.
Will start Lantus 8u at bedtime and continue coverage. Will continue monitoring blood sugars and FU.
Will continue current insulin regimen for now. Will continue monitoring blood sugars and FU.  Suggest to hold NPH dose if off tube feeds.  Suggest DC on current insulin regimen, FU 4 weeks.
Will decrease to NPH 7u q6 and continue coverage. Will continue monitoring blood sugars and FU.  Suggest to hold NPH dose if off tube feeds.  Suggest DC on current insulin regimen, FU 4 weeks.
Will increase to NPH 7u q6 and continue coverage. Will continue monitoring  blood sugars, will Follow up.  Suggest to hold NPH dose if off tube feeds.
Will continue NPH 7u q6 and continue coverage. Will continue monitoring blood sugars and FU.  Suggest to hold NPH dose if off tube feeds.  Suggest DC on current insulin regimen, FU 4 weeks.
Will increase to NPH 9u q6 and continue coverage. Will continue monitoring blood sugars and FU.  Suggest to hold NPH dose if off tube feeds.  Suggest DC on current insulin regimen, FU 4 weeks.
Will continue NPH 10u q6 and coverage. Will continue monitoring blood sugars and FU.  Suggest to hold NPH dose if off tube feeds.  Suggest DC on current insulin regimen, FU 4 weeks.
Will DC NPH and continue coverage. Will continue monitoring blood sugars and FU.
Will continue current insulin regimen for now. Will continue monitoring blood sugars and FU.  Suggest to hold NPH dose if off tube feeds.
Will continue current insulin regimen for now. Will continue monitoring  blood sugars, will Follow up.
Will continue monitoring  blood sugars, will Follow up.  Suggest to hold NPH dose if off tube feeds.

## 2022-10-07 NOTE — PROCEDURE NOTE - NSINDICATIONS_GEN_A_CORE
airway protection/cardiac arrest
arterial puncture to obtain ABG's/critical patient
critical illness/emergency venous access

## 2022-10-07 NOTE — PROGRESS NOTE ADULT - PROBLEM SELECTOR PLAN 3
Continue medications, monitoring, FU primary team recommendations. .

## 2022-10-07 NOTE — PROGRESS NOTE ADULT - PROBLEM SELECTOR PROBLEM 3
Tracheobronchomalacia
[Obese, well nourished, in no acute distress] : obese, well nourished, in no acute distress
Tracheobronchomalacia
[Normal] : affect appropriate
Tracheobronchomalacia

## 2022-10-07 NOTE — PROGRESS NOTE ADULT - SUBJECTIVE AND OBJECTIVE BOX
afberile    REVIEW OF SYSTEMS:  GEN: no fever,    no chills  RESP: no SOB,   no cough  CVS: no chest pain,   no palpitations  GI: no abdominal pain,   no nausea,   no vomiting,   no constipation,   no diarrhea  : no dysuria,   no frequency  NEURO: no headache,   no dizziness  PSYCH: no depression,   not anxious  Derm : no rash    MEDICATIONS  (STANDING):  albuterol/ipratropium for Nebulization 3 milliLiter(s) Nebulizer every 6 hours  allopurinol 100 milliGRAM(s) Oral daily  anastrozole 1 milliGRAM(s) Oral daily  atorvastatin 20 milliGRAM(s) Oral at bedtime  bisacodyl 5 milliGRAM(s) Oral at bedtime  buDESOnide    Inhalation Suspension 0.5 milliGRAM(s) Inhalation every 12 hours  chlorhexidine 2% Cloths 1 Application(s) Topical <User Schedule>  dextrose 50% Injectable 25 Gram(s) IV Push once  dextrose 50% Injectable 25 Gram(s) IV Push once  dextrose 50% Injectable 12.5 Gram(s) IV Push once  dextrose 50% Injectable 25 Gram(s) IV Push once  dextrose Oral Gel 15 Gram(s) Oral once  digoxin     Tablet 125 MICROGram(s) Oral daily  diltiazem    Tablet 30 milliGRAM(s) Enteral Tube every 6 hours  glucagon  Injectable 1 milliGRAM(s) IntraMuscular once  guaiFENesin Oral Liquid (Sugar-Free) 300 milliGRAM(s) Oral every 6 hours  insulin lispro (ADMELOG) corrective regimen sliding scale   SubCutaneous every 6 hours  levETIRAcetam  Solution 750 milliGRAM(s) Oral two times a day  levothyroxine 75 MICROGram(s) Oral daily  melatonin 3 milliGRAM(s) Oral at bedtime  prednisoLONE    3 mG/mL Solution (ORAPRED) 30 milliGRAM(s) Oral daily  prednisoLONE    3 mG/mL Solution (ORAPRED)   Oral   rivaroxaban 20 milliGRAM(s) Oral with dinner  senna 2 Tablet(s) Oral at bedtime    MEDICATIONS  (PRN):  acetaminophen     Tablet .. 650 milliGRAM(s) Oral every 6 hours PRN Temp greater or equal to 38C (100.4F), Mild Pain (1 - 3)  ALPRAZolam 0.25 milliGRAM(s) Oral every 8 hours PRN anxiety  polyethylene glycol 3350 17 Gram(s) Oral daily PRN Constipation      Vital Signs Last 24 Hrs  T(C): 36.4 (07 Oct 2022 04:30), Max: 36.4 (06 Oct 2022 11:16)  T(F): 97.5 (07 Oct 2022 04:30), Max: 97.6 (06 Oct 2022 11:16)  HR: 75 (07 Oct 2022 04:30) (63 - 79)  BP: 114/63 (07 Oct 2022 04:30) (98/63 - 139/82)  BP(mean): --  RR: 18 (07 Oct 2022 04:30) (18 - 18)  SpO2: 99% (07 Oct 2022 04:30) (98% - 100%)    Parameters below as of 07 Oct 2022 04:30  Patient On (Oxygen Delivery Method): BiPAP/CPAP      CAPILLARY BLOOD GLUCOSE      POCT Blood Glucose.: 116 mg/dL (07 Oct 2022 06:12)  POCT Blood Glucose.: 217 mg/dL (06 Oct 2022 23:42)  POCT Blood Glucose.: 136 mg/dL (06 Oct 2022 18:24)  POCT Blood Glucose.: 185 mg/dL (06 Oct 2022 11:56)    I&O's Summary    06 Oct 2022 07:01  -  07 Oct 2022 07:00  --------------------------------------------------------  IN: 0 mL / OUT: 350 mL / NET: -350 mL        PHYSICAL EXAM:  HEAD:  Atraumatic, Normocephalic  NECK: Supple, No   JVD  CHEST/LUNG:   no     rales,     no,    rhonchi  HEART: Regular rate and rhythm;         murmur  ABDOMEN: Soft, Nontender, ;   EXTREMITIES:    no    edema  NEUROLOGY:  alert    LABS:                        9.0    6.25  )-----------( 119      ( 06 Oct 2022 11:37 )             29.1                           Thyroid Stimulating Hormone, Serum: 0.32 uIU/mL (09-24 @ 11:34)          Consultant(s) Notes Reviewed:      Care Discussed with Consultants/Other Providers:

## 2022-10-07 NOTE — PROGRESS NOTE ADULT - PROBLEM SELECTOR PROBLEM 5
HLD (hyperlipidemia)

## 2022-10-07 NOTE — PROGRESS NOTE ADULT - PROBLEM SELECTOR PLAN 2
Will continue current Synthroid dose, FU outpatient.
Will continue current Synthroid dose, will FU.  Suggest to repeat thyroid function test in 4-6 weeks. Outpatient follow up.
Will continue current Synthroid dose, FU outpatient.

## 2022-10-07 NOTE — PROGRESS NOTE ADULT - SUBJECTIVE AND OBJECTIVE BOX
Chief complaint  Patient is a 87y old  Female who presents with a chief complaint of SOB (07 Oct 2022 07:17)   Review of systems  Patient in bed, looks comfortable, no hypoglycemic episodes.    Labs and Fingersticks  CAPILLARY BLOOD GLUCOSE      POCT Blood Glucose.: 322 mg/dL (07 Oct 2022 11:55)  POCT Blood Glucose.: 116 mg/dL (07 Oct 2022 06:12)  POCT Blood Glucose.: 217 mg/dL (06 Oct 2022 23:42)  POCT Blood Glucose.: 136 mg/dL (06 Oct 2022 18:24)      Anion Gap, Serum: 14 (10-07 @ 11:09)      Calcium, Total Serum: 8.6 (10-07 @ 11:09)          10-07    134<L>  |  100  |  27<H>  ----------------------------<  214<H>  4.2   |  20<L>  |  0.63    Ca    8.6      07 Oct 2022 11:09                          9.0    6.25  )-----------( 119      ( 06 Oct 2022 11:37 )             29.1     Medications  MEDICATIONS  (STANDING):  albuterol/ipratropium for Nebulization 3 milliLiter(s) Nebulizer every 6 hours  allopurinol 100 milliGRAM(s) Oral daily  anastrozole 1 milliGRAM(s) Oral daily  atorvastatin 20 milliGRAM(s) Oral at bedtime  bisacodyl 5 milliGRAM(s) Oral at bedtime  buDESOnide    Inhalation Suspension 0.5 milliGRAM(s) Inhalation every 12 hours  chlorhexidine 2% Cloths 1 Application(s) Topical <User Schedule>  dextrose 50% Injectable 25 Gram(s) IV Push once  dextrose 50% Injectable 25 Gram(s) IV Push once  dextrose 50% Injectable 12.5 Gram(s) IV Push once  dextrose 50% Injectable 25 Gram(s) IV Push once  dextrose Oral Gel 15 Gram(s) Oral once  digoxin     Tablet 125 MICROGram(s) Oral daily  diltiazem    Tablet 30 milliGRAM(s) Enteral Tube every 6 hours  glucagon  Injectable 1 milliGRAM(s) IntraMuscular once  guaiFENesin Oral Liquid (Sugar-Free) 300 milliGRAM(s) Oral every 6 hours  insulin glargine Injectable (LANTUS) 8 Unit(s) SubCutaneous at bedtime  insulin lispro (ADMELOG) corrective regimen sliding scale   SubCutaneous every 6 hours  levETIRAcetam  Solution 750 milliGRAM(s) Oral two times a day  levothyroxine 75 MICROGram(s) Oral daily  melatonin 3 milliGRAM(s) Oral at bedtime  prednisoLONE    3 mG/mL Solution (ORAPRED) 30 milliGRAM(s) Oral daily  prednisoLONE    3 mG/mL Solution (ORAPRED)   Oral   rivaroxaban 20 milliGRAM(s) Oral with dinner  senna 2 Tablet(s) Oral at bedtime      Physical Exam  General: Patient comfortable in bed  Vital Signs Last 12 Hrs  T(F): 98.4 (10-07-22 @ 10:58), Max: 98.4 (10-07-22 @ 10:58)  HR: 84 (10-07-22 @ 10:58) (63 - 84)  BP: 114/63 (10-07-22 @ 04:30) (114/63 - 114/63)  BP(mean): --  RR: 18 (10-07-22 @ 10:58) (18 - 18)  SpO2: 100% (10-07-22 @ 10:58) (98% - 100%)           Chief complaint  Patient is a 87y old  Female who presents with a chief complaint of SOB (07 Oct 2022 07:17)   Review of systems  Patient in bed, looks comfortable, no hypoglycemic episodes.    Labs and Fingersticks  CAPILLARY BLOOD GLUCOSE      POCT Blood Glucose.: 322 mg/dL (07 Oct 2022 11:55)  POCT Blood Glucose.: 116 mg/dL (07 Oct 2022 06:12)  POCT Blood Glucose.: 217 mg/dL (06 Oct 2022 23:42)  POCT Blood Glucose.: 136 mg/dL (06 Oct 2022 18:24)      Anion Gap, Serum: 14 (10-07 @ 11:09)      Calcium, Total Serum: 8.6 (10-07 @ 11:09)          10-07    134<L>  |  100  |  27<H>  ----------------------------<  214<H>  4.2   |  20<L>  |  0.63    Ca    8.6      07 Oct 2022 11:09                          9.0    6.25  )-----------( 119      ( 06 Oct 2022 11:37 )             29.1     Medications  MEDICATIONS  (STANDING):  albuterol/ipratropium for Nebulization 3 milliLiter(s) Nebulizer every 6 hours  allopurinol 100 milliGRAM(s) Oral daily  anastrozole 1 milliGRAM(s) Oral daily  atorvastatin 20 milliGRAM(s) Oral at bedtime  bisacodyl 5 milliGRAM(s) Oral at bedtime  buDESOnide    Inhalation Suspension 0.5 milliGRAM(s) Inhalation every 12 hours  chlorhexidine 2% Cloths 1 Application(s) Topical <User Schedule>  dextrose 50% Injectable 25 Gram(s) IV Push once  dextrose 50% Injectable 25 Gram(s) IV Push once  dextrose 50% Injectable 12.5 Gram(s) IV Push once  dextrose 50% Injectable 25 Gram(s) IV Push once  dextrose Oral Gel 15 Gram(s) Oral once  digoxin     Tablet 125 MICROGram(s) Oral daily  diltiazem    Tablet 30 milliGRAM(s) Enteral Tube every 6 hours  glucagon  Injectable 1 milliGRAM(s) IntraMuscular once  guaiFENesin Oral Liquid (Sugar-Free) 300 milliGRAM(s) Oral every 6 hours  insulin glargine Injectable (LANTUS) 8 Unit(s) SubCutaneous at bedtime  insulin lispro (ADMELOG) corrective regimen sliding scale   SubCutaneous every 6 hours  levETIRAcetam  Solution 750 milliGRAM(s) Oral two times a day  levothyroxine 75 MICROGram(s) Oral daily  melatonin 3 milliGRAM(s) Oral at bedtime  prednisoLONE    3 mG/mL Solution (ORAPRED) 30 milliGRAM(s) Oral daily  prednisoLONE    3 mG/mL Solution (ORAPRED)   Oral   rivaroxaban 20 milliGRAM(s) Oral with dinner  senna 2 Tablet(s) Oral at bedtime      Physical Exam  General: Patient comfortable in bed  Vital Signs Last 12 Hrs  T(F): 98.4 (10-07-22 @ 10:58), Max: 98.4 (10-07-22 @ 10:58)  HR: 84 (10-07-22 @ 10:58) (63 - 84)  BP: 114/63 (10-07-22 @ 04:30) (114/63 - 114/63)  BP(mean): --  RR: 18 (10-07-22 @ 10:58) (18 - 18)  SpO2: 100% (10-07-22 @ 10:58) (98% - 100%)

## 2022-10-07 NOTE — PROCEDURE NOTE - NSPROCDETAILS_GEN_ALL_CORE
patient pre-oxygenated, tube inserted, placement confirmed
location identified, draped/prepped, sterile technique used, needle inserted/introduced/positive blood return obtained via catheter/connected to a pressurized flush line/sutured in place/hemostasis with direct pressure, dressing applied/Seldinger technique/all materials/supplies accounted for at end of procedure
guidewire recovered/lumen(s) aspirated and flushed/sterile dressing applied/sterile technique, catheter placed/ultrasound guidance with use of sterile gel and probe cove

## 2022-10-07 NOTE — PROGRESS NOTE ADULT - REASON FOR ADMISSION
SOB

## 2022-10-07 NOTE — CHART NOTE - NSCHARTNOTEFT_GEN_A_CORE
I spoke to patients son, Sy Scott, at patients bedside. I explained to him that patient remains critically ill on 3 high dose vasopressors and is at high risk for further decompensation. I addressed the grave overall prognosis and Sy stated that his brother Kp is the patients HCP and specifically told him to continue full measures including CPR and resuscitation. Sy provided me with his and Kp's phone number prior to him leaving hospitals. He stated Kp will likely not answer his phone and to call him(Sy) first for medical updates.   I spoke to CICU fellow and CICU attending as well regarding patients current condition and the decision was made to cap vasopressors at current doses, which are Vaso .1U/Min, Levo 2mcg/kg/min, and Herson 5mcg/kg/min.

## 2022-10-07 NOTE — PROVIDER CONTACT NOTE (CHANGE IN STATUS NOTIFICATION) - ASSESSMENT
unresponsive, no pulse, and tele report of Vfib arrest Code Blue called
elevated respiratory rate and wheezing when cleaning patient; pt dyspnic stating "can't breathe"

## 2022-10-07 NOTE — CHART NOTE - NSCHARTNOTEFT_GEN_A_CORE
CCU Accept Note    Transfer from: [facility]    Accepting physician: Dr. 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 Very limited hx obtained from pt given mental status, dyspnea, and use of BIPAP. Unable to reach sonSy. History obtained from staff/chart review  Of note, pt has multiple recent admissions with similar presentation. Most recently was hospitalized from 8/28/22-9/8/22 for SOB, treated with airway clearance and completed 5-day course of Zosyn for empiric coverage of PNA (though per Pulm, unlikely to have PNA). Course c/b PEG displacement and subsequent reinsertion by IR on 8/30. In addition to her usual PEG feeds, pt was also started on puree diet with moderately thickened fluids for pleasure feeds. Also was started on anastrozole for most likely dx of metastatic breast cancer to bones (pt was unable to tolerate further cancer workup of spine lesions). Pt remained full code during recent admissions.  pt with metastatic ca to the bone with sob sec to obesity and underlying lung and cardiac disease    OBJECTIVE DATA:    Vital Signs Last 24 Hrs  T(C): 36.9 (07 Oct 2022 10:58), Max: 36.9 (07 Oct 2022 10:58)  T(F): 98.4 (07 Oct 2022 10:58), Max: 98.4 (07 Oct 2022 10:58)  HR: 66 (07 Oct 2022 15:09) (63 - 84)  BP: 114/63 (07 Oct 2022 04:30) (98/63 - 114/63)  RR: 18 (07 Oct 2022 10:58) (18 - 18)  SpO2: 97% (07 Oct 2022 15:09) (97% - 100%)    Parameters below as of 07 Oct 2022 10:58  Patient On (Oxygen Delivery Method): nasal cannula      I&O's Summary    06 Oct 2022 07:01  -  07 Oct 2022 07:00  --------------------------------------------------------  IN: 0 mL / OUT: 350 mL / NET: -350 mL    07 Oct 2022 07:01  -  07 Oct 2022 18:23  --------------------------------------------------------  IN: 0 mL / OUT: 300 mL / NET: -300 mL          MEDICATIONS  (STANDING):  albuterol/ipratropium for Nebulization 3 milliLiter(s) Nebulizer every 6 hours  allopurinol 100 milliGRAM(s) Oral daily  anastrozole 1 milliGRAM(s) Oral daily  atorvastatin 20 milliGRAM(s) Oral at bedtime  bisacodyl 5 milliGRAM(s) Oral at bedtime  buDESOnide    Inhalation Suspension 0.5 milliGRAM(s) Inhalation every 12 hours  chlorhexidine 2% Cloths 1 Application(s) Topical <User Schedule>  dextrose 50% Injectable 25 Gram(s) IV Push once  dextrose 50% Injectable 25 Gram(s) IV Push once  dextrose 50% Injectable 12.5 Gram(s) IV Push once  dextrose 50% Injectable 25 Gram(s) IV Push once  dextrose Oral Gel 15 Gram(s) Oral once  digoxin     Tablet 125 MICROGram(s) Oral daily  diltiazem    Tablet 30 milliGRAM(s) Enteral Tube every 6 hours  glucagon  Injectable 1 milliGRAM(s) IntraMuscular once  guaiFENesin Oral Liquid (Sugar-Free) 300 milliGRAM(s) Oral every 6 hours  insulin glargine Injectable (LANTUS) 8 Unit(s) SubCutaneous at bedtime  insulin lispro (ADMELOG) corrective regimen sliding scale   SubCutaneous every 6 hours  levETIRAcetam  Solution 750 milliGRAM(s) Oral two times a day  levothyroxine 75 MICROGram(s) Oral daily  melatonin 3 milliGRAM(s) Oral at bedtime  prednisoLONE    3 mG/mL Solution (ORAPRED) 30 milliGRAM(s) Oral daily  prednisoLONE    3 mG/mL Solution (ORAPRED)   Oral   rivaroxaban 20 milliGRAM(s) Oral with dinner  senna 2 Tablet(s) Oral at bedtime    MEDICATIONS  (PRN):  acetaminophen     Tablet .. 650 milliGRAM(s) Oral every 6 hours PRN Temp greater or equal to 38C (100.4F), Mild Pain (1 - 3)  ALPRAZolam 0.25 milliGRAM(s) Oral every 8 hours PRN anxiety  polyethylene glycol 3350 17 Gram(s) Oral daily PRN Constipation      LABS                              134    |  100    |  27                  Calcium: 8.6   / iCa: x      (10-07 @ 11:09)    ----------------------------<  214       Magnesium: x                                4.2     |  20     |  0.63             Phosphorous: x              ASSESSMENT & PLAN: CCU Accept Note    Transfer from: [facility]    Accepting physician: Dr. 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 She required BiPAP which had been weaned to nasal cannula, was started on steroids and completed a course of remdesivir.   Of note, pt has multiple recent admissions with similar presentation. Most recently was hospitalized from 8/28/22-9/8/22 for SOB, treated with airway clearance and completed 5-day course of Zosyn for empiric coverage of PNA (though per Pulm, unlikely to have PNA). Course c/b PEG displacement and subsequent reinsertion by IR on 8/30. In addition to her usual PEG feeds, pt was also started on puree diet with moderately thickened fluids for pleasure feeds. Also was started on anastrozole for most likely dx of metastatic breast cancer to bones (pt was unable to tolerate further cancer workup of spine lesions). Pt remained full code during recent admissions.        OBJECTIVE DATA:    Vital Signs Last 24 Hrs  T(C): 36.9 (07 Oct 2022 10:58), Max: 36.9 (07 Oct 2022 10:58)  T(F): 98.4 (07 Oct 2022 10:58), Max: 98.4 (07 Oct 2022 10:58)  HR: 66 (07 Oct 2022 15:09) (63 - 84)  BP: 114/63 (07 Oct 2022 04:30) (98/63 - 114/63)  RR: 18 (07 Oct 2022 10:58) (18 - 18)  SpO2: 97% (07 Oct 2022 15:09) (97% - 100%)    Parameters below as of 07 Oct 2022 10:58  Patient On (Oxygen Delivery Method): nasal cannula      I&O's Summary    06 Oct 2022 07:01  -  07 Oct 2022 07:00  --------------------------------------------------------  IN: 0 mL / OUT: 350 mL / NET: -350 mL    07 Oct 2022 07:01  -  07 Oct 2022 18:23  --------------------------------------------------------  IN: 0 mL / OUT: 300 mL / NET: -300 mL          MEDICATIONS  (STANDING):  albuterol/ipratropium for Nebulization 3 milliLiter(s) Nebulizer every 6 hours  allopurinol 100 milliGRAM(s) Oral daily  anastrozole 1 milliGRAM(s) Oral daily  atorvastatin 20 milliGRAM(s) Oral at bedtime  bisacodyl 5 milliGRAM(s) Oral at bedtime  buDESOnide    Inhalation Suspension 0.5 milliGRAM(s) Inhalation every 12 hours  chlorhexidine 2% Cloths 1 Application(s) Topical <User Schedule>  dextrose 50% Injectable 25 Gram(s) IV Push once  dextrose 50% Injectable 25 Gram(s) IV Push once  dextrose 50% Injectable 12.5 Gram(s) IV Push once  dextrose 50% Injectable 25 Gram(s) IV Push once  dextrose Oral Gel 15 Gram(s) Oral once  digoxin     Tablet 125 MICROGram(s) Oral daily  diltiazem    Tablet 30 milliGRAM(s) Enteral Tube every 6 hours  glucagon  Injectable 1 milliGRAM(s) IntraMuscular once  guaiFENesin Oral Liquid (Sugar-Free) 300 milliGRAM(s) Oral every 6 hours  insulin glargine Injectable (LANTUS) 8 Unit(s) SubCutaneous at bedtime  insulin lispro (ADMELOG) corrective regimen sliding scale   SubCutaneous every 6 hours  levETIRAcetam  Solution 750 milliGRAM(s) Oral two times a day  levothyroxine 75 MICROGram(s) Oral daily  melatonin 3 milliGRAM(s) Oral at bedtime  prednisoLONE    3 mG/mL Solution (ORAPRED) 30 milliGRAM(s) Oral daily  prednisoLONE    3 mG/mL Solution (ORAPRED)   Oral   rivaroxaban 20 milliGRAM(s) Oral with dinner  senna 2 Tablet(s) Oral at bedtime    MEDICATIONS  (PRN):  acetaminophen     Tablet .. 650 milliGRAM(s) Oral every 6 hours PRN Temp greater or equal to 38C (100.4F), Mild Pain (1 - 3)  ALPRAZolam 0.25 milliGRAM(s) Oral every 8 hours PRN anxiety  polyethylene glycol 3350 17 Gram(s) Oral daily PRN Constipation      LABS                              134    |  100    |  27                  Calcium: 8.6   / iCa: x      (10-07 @ 11:09)    ----------------------------<  214       Magnesium: x                                4.2     |  20     |  0.63             Phosphorous: x              ASSESSMENT & PLAN: CCU Accept Note    Transfer from: 4 [facility]    Accepting physician: Dr. 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 She required BiPAP which had been weaned to nasal cannula, was started on steroids and completed a course of remdesivir.   Of note, pt has multiple recent admissions with similar presentation. Most recently was hospitalized from 8/28/22-9/8/22 for SOB, treated with airway clearance and completed 5-day course of Zosyn for empiric coverage of PNA (though per Pulm, unlikely to have PNA). Course c/b PEG displacement and subsequent reinsertion by IR on 8/30. In addition to her usual PEG feeds, pt was also started on puree diet with moderately thickened fluids for pleasure feeds. Also was started on anastrozole for most likely dx of metastatic breast cancer to bones (pt was unable to tolerate further cancer workup of spine lesions). Pt remained full code during recent admissions.  10/7 s/p code blue for VFib arrest with ROSC after approximately 15 minutes and intubation during ACLS. Patient transferred to MICU under CICU care for further management pending bed availability.         OBJECTIVE DATA:    Vital Signs Last 24 Hrs  T(C): 36.9 (07 Oct 2022 10:58), Max: 36.9 (07 Oct 2022 10:58)  T(F): 98.4 (07 Oct 2022 10:58), Max: 98.4 (07 Oct 2022 10:58)  HR: 66 (07 Oct 2022 15:09) (63 - 84)  BP: 114/63 (07 Oct 2022 04:30) (98/63 - 114/63)  RR: 18 (07 Oct 2022 10:58) (18 - 18)  SpO2: 97% (07 Oct 2022 15:09) (97% - 100%)    Parameters below as of 07 Oct 2022 10:58  Patient On (Oxygen Delivery Method): nasal cannula      I&O's Summary    06 Oct 2022 07:01  -  07 Oct 2022 07:00  --------------------------------------------------------  IN: 0 mL / OUT: 350 mL / NET: -350 mL    07 Oct 2022 07:01  -  07 Oct 2022 18:23  --------------------------------------------------------  IN: 0 mL / OUT: 300 mL / NET: -300 mL          MEDICATIONS  (STANDING):  albuterol/ipratropium for Nebulization 3 milliLiter(s) Nebulizer every 6 hours  allopurinol 100 milliGRAM(s) Oral daily  anastrozole 1 milliGRAM(s) Oral daily  atorvastatin 20 milliGRAM(s) Oral at bedtime  bisacodyl 5 milliGRAM(s) Oral at bedtime  buDESOnide    Inhalation Suspension 0.5 milliGRAM(s) Inhalation every 12 hours  chlorhexidine 2% Cloths 1 Application(s) Topical <User Schedule>  dextrose 50% Injectable 25 Gram(s) IV Push once  dextrose 50% Injectable 25 Gram(s) IV Push once  dextrose 50% Injectable 12.5 Gram(s) IV Push once  dextrose 50% Injectable 25 Gram(s) IV Push once  dextrose Oral Gel 15 Gram(s) Oral once  digoxin     Tablet 125 MICROGram(s) Oral daily  diltiazem    Tablet 30 milliGRAM(s) Enteral Tube every 6 hours  glucagon  Injectable 1 milliGRAM(s) IntraMuscular once  guaiFENesin Oral Liquid (Sugar-Free) 300 milliGRAM(s) Oral every 6 hours  insulin glargine Injectable (LANTUS) 8 Unit(s) SubCutaneous at bedtime  insulin lispro (ADMELOG) corrective regimen sliding scale   SubCutaneous every 6 hours  levETIRAcetam  Solution 750 milliGRAM(s) Oral two times a day  levothyroxine 75 MICROGram(s) Oral daily  melatonin 3 milliGRAM(s) Oral at bedtime  prednisoLONE    3 mG/mL Solution (ORAPRED) 30 milliGRAM(s) Oral daily  prednisoLONE    3 mG/mL Solution (ORAPRED)   Oral   rivaroxaban 20 milliGRAM(s) Oral with dinner  senna 2 Tablet(s) Oral at bedtime      LABS                              134    |  100    |  27                  Calcium: 8.6   / iCa: x      (10-07 @ 11:09)    ----------------------------<  214       Magnesium: x                                4.2     |  20     |  0.63             Phosphorous: x              ASSESSMENT & PLAN:  86F w/ extensive hx including severe tracheobronchomalacia (c/b hypoxia 2/2 mucous plugging and recurrent L lung collapse), 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 Transferred to CICU for further management.     Neuro  - Continue Propofol for vent synchrony   - Neuro checks per protocol, per report patient was AOx3 prior to cardiac arrest   - Defer SAT while patient is hemodynamically unstable     Resp  # Acute on chronic respiratory failure, tracheobronchomalacia, COVID   - Maintain full vent support: 500/20/+5/70%, deferring SBT for now   - Monitor ABGs and adjust ventilator settings   - 9/28 CT Chest: Airways patent without mucoid impaction, mild BL areas of subsegmental atelectasis, diffuse lytic bone lesions again noted  - completed 5 days of remdesivir  - continue prednisolone taper  - pulm following    CV  # VF arrest, Distributive shock  - 1/21/22 Echo: EF 70-75%, Concentric LVH, hyperdynamic LVSF, RV not well visualized  - Received IV Amio during code blue  - start amio gtt loading and monitor lytes closely   - patient is on high dose Levo, Herson, and Vaso. Per discussion with CICU attending will cap pressors       Renal  - Scr prior to arrest .64  - place flanagan for strict IOs     GI  - peg tube in place    Heme  - Monitor CBCs  - Cont xarelto     Endo  Hyperglycemia  - start insulin gtt , monitor FS     ID  COVID + 9/29  - completed remdesivir  - continue prednisolone taper     Lines  - RF TLC 10/7  - RF [place] 10/7  - ETT 10/7  - Flan[place] 10/7

## 2022-10-07 NOTE — CHART NOTE - NSCHARTNOTEFT_GEN_A_CORE
Called by tele to report v-fib. Pt unresponsive, no pulse. Code blue called for v-fib arrest (refer to code note). ACLS protocol initiated immediately. After approximately 15 minutes of CPR, ROSC was achieved and pt was transferred to MICU intubated. Pt's son Sy Scott at bedside. Dr. Segundo notified.    Kala Barlow, NP-c  [id number]

## 2022-10-07 NOTE — PROCEDURE NOTE - NSICDXPROCEDURE_GEN_ALL_CORE_FT
PROCEDURES:  Arterial line pressure monitoring 07-Oct-2022 20:08:54  Ayesha Reid  Insert CV cath w/o tunnel 5+yr 07-Oct-2022 20:10:28  Ayesha Reid  US guided central cath 07-Oct-2022 20:10:35  Ayesha Reid  
PROCEDURES:  Arterial line pressure monitoring 07-Oct-2022 20:08:54  Ayesha Reid

## 2022-10-07 NOTE — PROVIDER CONTACT NOTE (CHANGE IN STATUS NOTIFICATION) - SITUATION
pt with respiratory distress
Code Blue called for unresponsive, no pulse, and tele report of Vfib arrest

## 2022-10-07 NOTE — PROGRESS NOTE ADULT - ASSESSMENT
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 She also has a history of HTN, HLD, CAD s/p MI with preserved LVEF and moderate aortic stenosis. The patient was admitted to [facility] in December 2021 with fever and abdominal pain due to perforated appendicitis. She was treated with tube drainage and antibiotics for a polymicrobial infection. Hospital course was complicated by respiratory failure due to mucous plugging with complete collapse of the left lung. She underwent several bronchoscopies for pulmonary toilet and was found to have severe tracheobronchomalacia. Her respiratory status has remained "stable" while at [facility] on nebulized bronchodilators and hypertonic saline and without nocturnal NIV. She was admitted in March with hematochezia. The left lower lobe was well aerated on a CT scan of the abdomen and pelvis with only bibasilar subsegmental atelectasis - there was scattered sigmoid diverticulosis without evidence of diverticulitis - interval decrease in size of a fluid collection in the region of the previously perforated appendicitis measuring 2.8 x 1.5 cm previously measuring 4.0 x 2.2 cm - slight increase in mild adjacent inflammatory change. The GI bleed was treated conservatively.  She was admitted on 8/29 with shortness of breath in the setting of back, neck and left lower extremity pain. She required NIV for mild acute hypercapnic respiratory failure that quickly resolved. Her respiratory status remained stable on room air and on her usual nebs and mucolytics. She was started on a puree diet with moderately thickened fluids in addition to PEG feeds following evaluation by the speech and swallow team. She was started on anastrazole for likely metastatic breast cancer to the bone. On the day of discharge, the patient was quite anxious with shortness of breath, chest congestion and wheeze - she was making a grunting noise with expiration. She was returned from [facility] that evening. Currently she is tachypneic and using accessory muscles of respiration. She has an audible wheeze and cough productive of scant sputum. No fevers, chills or sweats. No chest pain/pressure or palpitations.     the patient has new onset bronchospasm with increased work of breathing and worsening left lower lobe atelectasis -  she has severe tracheobronchomalacia that has resulted in mucous plugging with complete left lung collapse requiring several bronchoscopies for pulmonary toilet in the past - the patient was felt not to be a candidate for surgery for the tracheobronchomalacia and stenting was felt to have more risk than benefit     metastatic breast cancer    9/19 -  found to be COVID positive on discharge [facility]; awake and alert; now with marked worsening of shortness of breath and using her accessory muscles of respiration; severe chest congestion and wheeze exacerbated by neck flexion; increasing pCO2, WBC and lactate; occasional cough productive of scant sputum; no fevers, chills or sweats; no chest pain/pressure or palpitations; back on PEG feeds; switched from nebs to inhalers yesterday as Dr. Dexter was informed by nursing that COVID positive patients cannot receive nebs at [facility]    9/21 - spent the day on AVAPS -> removed this AM with somewhat less increased work of breathing and decreased chest congestion and wheeze; VBG not sent this AM; in airborne and contact isolation due to hospital acquired COVID infection; awake and alert; occasional cough productive of scant sputum; no fevers, chills or sweats; no chest pain/pressure or palpitations;     9/23 - off AVAPS this AM - laying almost flat in bed with neck and back flexed; increased work of breathing; in airborne and contact isolation due to hospital acquired COVID infection; awake and alert; begging for lemonade; occasional cough productive of scant sputum; improving chest congestion or wheeze; no fevers, chills or sweats; no chest pain/pressure or palpitations; receiving PEG feeds when off NIV;    9/26 -  again laying almost flat in bed with neck and back flexed; decreased work of breathing now on a nasal canula during the day and AVAPS at night; airborne and contact isolation being discontinued after treatment for a hospital acquired COVID infection; awake and alert asking for something to eat and drink; occasional cough productive of scant sputum; improving chest congestion and wheeze; no fevers, chills or sweats; no chest pain/pressure or palpitations; receiving PEG feeds    9/30 - hoisted up in bed earlier this AM - neck flexed onto the anterior chest wall; increased tachypnea without hypoxemia on a 3lpm nasal canula today; no cough, sputum production, hemoptysis, chest congestion or wheeze; no fevers, chills or sweats; no chest pain/pressure or palpitations; tolerating ICE chips.     10/4 - speaking in full sentences; wants to go back to [facility]; flat in bed; neck flexed onto the anterior chest wall; looks enormously uncomfortable with tachypnea and increased work of breathing; no cough, sputum production, hemoptysis, chest congestion or wheeze; no fevers, chills or sweats; no chest pain/pressure or palpitations; asking for ICE chips.     10/6 - discussions are ongoing about goals of care with the patient's children; the patient's son does not want us to give up and suggests getting a different bed so that she will not slide down; off NIV for only 45 minutes yesterday; flat in bed; neck flexed onto the anterior chest wall; no cough, sputum production, hemoptysis, chest congestion or wheeze; no fevers, chills or sweats; no chest pain/pressure or palpitations;      PLAN/RECOMMENDATIONS:    the patient's head needs to be elevated greater than 45 degrees at all times - unfortunately her neck is flexed downward and rests on her anterior chest obstructing her upper airway - she continues to slide down in bed limiting her diaphragmatic excursion  repeat chest CT -> patent central airways - stable subsegmental atelectasis within posterior upper lobes, lingula and basilar lower lobes - trace fluid within the left fissure - no significant pleural effusion or pneumothorax - new T8 compression deformity - redemonstration of T2 and T3 compression fractures - unchanged mild T11 wedge deformity - redemonstration of multiple lytic lesions in the thoracic spine - fixation plate and screws within the left proximal humerus - status post right-sided mastectomy  oxygen supplementation via a nasal canula during the day to keep saturation greater than 92% as able - currently on a 5lpm nasal canula  nocturnal BIPAP  place on BIPAP during the day for increased work of breathing, resistant hypoxemia and/or respiratory acidosis  ABG 10/7 -> 7.43/37/163/25/100.0  - resolved respiratory acidosis - excellent oxygenation  has completed a 5 day course of remdesivir  prednisolone taper as written - will need to adjust insulin accordingly  continue albuterol/atrovent nebs q6h  continue pulmicort 0.5mg nebs q12h  continue guaifenesin 300mg 4 times daily via PEG  incentive spirometry   PEG feeds - see recommendations by nutrition service  CT scan 8/31 -> multiple lytic lesions are seen throughout the axial and appendicular skeleton, some of which appear increased in size from CT abdomen pelvis 3/16/2022 when evaluated in retrospect - a few lytic lesions involve the left intertrochanteric region and right acetabulum  oncology follow-up noted    s/p right simple mastectomy 4/2019 for breast cancer but did not receive adjuvant hormonal therapy    it is likely that the patient has metastatic breast cancer    started on anastrozole as the patient could not tolerate a MRI and is not a candidate for bone biopsy  cardiology follow-up for management of HTN, HLD, CAD, aortic stenosis and atrial fibrillation     cardiac meds: digoxin/diltiazem/xarelto  DVT prophylaxis - xarelto  glucose control  bowel regimen  analgesics  keppra    Will follow with you. Plan of care discussed with the patient at bedside, the dedicated floor NP and the patient's son Sy Galloway by phone. The patient's respiratory status remains tenuous and she will unlikely be able to be discharged back to [facility] - her children are very unrealistic - palliative care evaluation noted    Kennedy Marcano MD, [facility]  [phone]  Pulmonary Medicine

## 2022-10-07 NOTE — CHART NOTE - NSCHARTNOTEFT_GEN_A_CORE
Patient continues to rapidly deteriorate and is becoming progressively more hypotensive on current support. Full labs sent significant thus far for hemoglobin on ABG of 5.9. CICU fellow spoke to patients son via phone who agreed to PRBC transfusion. Readdressed GOC and patient remains full code at this time.     Blood Gas Profile - Arterial (10.07.22 @ 22:13)    pH, Arterial: 7.17    pCO2, Arterial: 29 mmHg    pO2, Arterial: 186 mmHg    HCO3, Arterial: 11 mmol/L    Base Excess, Arterial: -16.4 mmol/L    Oxygen Saturation, Arterial: 99.1 %    Total CO2, Arterial: 12 mmol/L    FIO2, Arterial: 70

## 2022-10-07 NOTE — PROGRESS NOTE ADULT - PROBLEM SELECTOR PLAN 4
Suggest to continue medications, monitoring, FU primary team recommendations. .

## 2022-10-07 NOTE — RAPID RESPONSE TEAM SUMMARY - NSSITUATIONBACKGROUNDRRT_GEN_ALL_CORE
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    Code blue called overhead. Compressions had been started prior to arrival. On first pulse check pt was in V-fib, pt shocked and given epi x1. CPR continued and on second pulse check, she converted to PEA. Additional epi given and CPR continued. On next pulse check, she back in V-fib and additional shock was delivered and epi x1 again given. CPR was continued and during next pulse check, she had returned to PEA and additional epi was given. Amp of bicarb was also given at this point. After 15 minutes of CPR in total, ROSC was achieved and en. Tele strip prior to cardiac arrest was polymorphic VT. Magnesium 2g over 5 minutes was given. She was intubated during arrest and induced with etomidate. She was started on levophed post-arrest and propofol for sedation. She was given 300 mg amiodarone after achieving ROSC as per cardiology recommendations. CICU was consulted during arrest and accepted patient.

## 2022-10-07 NOTE — CHART NOTE - NSCHARTNOTEFT_GEN_A_CORE
CCU Expiration Note    Notified by RN that patient was having PEA arrest at 23:42. ACLS initiated immediately. Despite approximately 10 minutes of resuscitation patient was unable to achieve ROSC. Patient was pronounced dead by cardiopulmonary criteria at 23:53. Patient was unresponsive to verbal and painful stimuli. Heart and lung sounds were absent. No spontaneous respiratory or cardiac activity present. No corneal pupillary reflex present. Pupils fixed and dilated.     I attempted reaching patients johny Goodrich at both phone numbers provided and was unable to get through. CICU fellow spoke to patients other son, Sy Scott via phone and notified him of patients expiration.       Ayesha Pierre, CCU [facility]

## 2022-10-07 NOTE — PROGRESS NOTE ADULT - SUBJECTIVE AND OBJECTIVE BOX
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; PEG in place    INTERVAL HISTORY: discussions are ongoing about goals of care with the patient's children; the patient tolerated being off NIV yesterday and again today; slept with BIPAP; flat in bed; neck flexed onto the anterior chest wall; no cough, sputum production, hemoptysis, chest congestion or wheeze; no fevers, chills or sweats; no chest pain/pressure or palpitations; receiving PEG feeds    REVIEW OF SYSTEMS:  Constitutional: As per interval history  HEENT: Within normal limits  CV: As per interval history  Resp: As per interval history  GI: dysphagia  : Within normal limits  Musculoskeletal: Within normal limits  Skin: Within normal limits  Neurological: CVA - left sided plegia  Psychiatric: Within normal limits  Endocrine: Within normal limits  Hematologic/Lymphatic: Within normal limits  Allergic/Immunologic: Within normal limits    MEDICATIONS:     Pulmonary "  albuterol/ipratropium for Nebulization 3 milliLiter(s) Nebulizer every 6 hours  buDESOnide    Inhalation Suspension 0.5 milliGRAM(s) Inhalation every 12 hours  guaiFENesin Oral Liquid (Sugar-Free) 300 milliGRAM(s) Oral every 6 hours    Anti-microbials:    Cardiovascular:  digoxin     Tablet 125 MICROGram(s) Oral daily  diltiazem    Tablet 30 milliGRAM(s) Enteral Tube every 6 hours    Other:  allopurinol 100 milliGRAM(s) Oral daily  anastrozole 1 milliGRAM(s) Oral daily  atorvastatin 20 milliGRAM(s) Oral at bedtime  bisacodyl 5 milliGRAM(s) Oral at bedtime  chlorhexidine 2% Cloths 1 Application(s) Topical <User Schedule>  dextrose 50% Injectable 25 Gram(s) IV Push once  dextrose 50% Injectable 25 Gram(s) IV Push once  dextrose 50% Injectable 12.5 Gram(s) IV Push once  dextrose 50% Injectable 25 Gram(s) IV Push once  dextrose Oral Gel 15 Gram(s) Oral once  glucagon  Injectable 1 milliGRAM(s) IntraMuscular once  insulin glargine Injectable (LANTUS) 8 Unit(s) SubCutaneous at bedtime  insulin lispro (ADMELOG) corrective regimen sliding scale   SubCutaneous every 6 hours  levETIRAcetam  Solution 750 milliGRAM(s) Oral two times a day  levothyroxine 75 MICROGram(s) Oral daily  melatonin 3 milliGRAM(s) Oral at bedtime  prednisoLONE    3 mG/mL Solution (ORAPRED) 30 milliGRAM(s) Oral daily  prednisoLONE    3 mG/mL Solution (ORAPRED)   Oral   rivaroxaban 20 milliGRAM(s) Oral with dinner  senna 2 Tablet(s) Oral at bedtime    MEDICATIONS  (PRN):  acetaminophen     Tablet .. 650 milliGRAM(s) Oral every 6 hours PRN Temp greater or equal to 38C (100.4F), Mild Pain (1 - 3)  ALPRAZolam 0.25 milliGRAM(s) Oral every 8 hours PRN anxiety  polyethylene glycol 3350 17 Gram(s) Oral daily PRN Constipation        OBJECTIVE:    POCT Blood Glucose.: 322 mg/dL (07 Oct 2022 11:55)  POCT Blood Glucose.: 116 mg/dL (07 Oct 2022 06:12)  POCT Blood Glucose.: 217 mg/dL (06 Oct 2022 23:42)  POCT Blood Glucose.: 136 mg/dL (06 Oct 2022 18:24)    PHYSICAL EXAM:       ICU Vital Signs Last 24 Hrs  T(C): 36.9 (07 Oct 2022 10:58), Max: 36.9 (07 Oct 2022 10:58)  T(F): 98.4 (07 Oct 2022 10:58), Max: 98.4 (07 Oct 2022 10:58)  HR: 66 (07 Oct 2022 15:09) (63 - 84)  BP: 114/63 (07 Oct 2022 04:30) (98/63 - 114/63)  BP(mean): --  ABP: --  ABP(mean): --  RR: 18 (07 Oct 2022 10:58) (18 - 18)  SpO2: 97% (07 Oct 2022 15:09) (97% - 100%) on 5lpm nasal canula     General: Awake. Alert. Cooperative. Appears stated age. Obese. Laying flat in bed.   HEENT: Atraumatic. Normocephalic. Anicteric. Normal oral mucosa. PERRL. EOMI. Mallampati class IV airway. Poor dentition.   Neck: Supple. Trachea midline. Thyroid without enlargement/tenderness/nodules. No carotid bruit. No JVD. Short and wide. Neck flexed with chin resting on her anterior chest.  Cardiovascular: Regular rate and rhythm. S1 S2 normal. III/VI systolic murmur  Respiratory: Rapid and shallow breathing. Using accessory muscles of respiration. Clear anteriorly. Kyphosis. Poor chest wall excursion  Abdomen: Soft. Non-tender. Non-distended. No organomegaly. No masses. Normal bowel sounds. Obese. PEG.  Extremities: Warm to touch. No clubbing or cyanosis. No pedal edema. Large legs. Left leg contracted under the right leg  Pulses: 2+ peripheral pulses all extremities.	  Skin: Normal skin color. No rashes or lesions. No ecchymoses. No cyanosis. Warm to touch.  Lymph Nodes: Cervical, supraclavicular and axillary nodes normal  Neurological: Left lower extremity plegia with contraction. Left upper extremity is quite weak. A and O x 3  Psychiatry: Appropriate mood and affect.    LABS:                          9.0    6.25  )-----------( 119      ( 06 Oct 2022 11:37 )             29.1     CBC    WBC  6.25 <==, 7.15 <==, 8.39 <==, See Note <==    Hemoglobin  9.0 <<==, 7.3 <<==, 7.9 <<==, See note <<==    Hematocrit  29.1 <==, 25.2 <==, 26.4 <==, See note <==    Platelets  119 <==, 136 <==, 140 <==, See note <==      134<L>  |  100  |  27<H>  ----------------------------<  214<H>    10-07  4.2   |  20<L>  |  0.63      LYTES    sodium  134 <==, 135 <==, 136 <==, 139 <==, 145 <==    potassium   4.2 <==, 4.5 <==, 5.1 <==, 4.6 <==, 4.9 <==    chloride  100 <==, 99 <==, 99 <==, 103 <==, 107 <==    carbon dioxide  20 <==, 24 <==, 23 <==, 24 <==, 20 <==    =============================================================================================  RENAL FUNCTION:    Creatinine:   0.63  <<==, 0.64  <<==, 0.73  <<==, 0.76  <<==, 0.79  <<==    BUN:   27 <==, 26 <==, 34 <==, 37 <==, 37 <==    ============================================================================================    calcium   8.6 <==, 8.7 <==, 8.4 <==, 8.8 <==, 8.6 <==    ============================================================================================  Venous Blood Gas:  10-07 @ 09:27  7.43/37/163/25/100.0  VBG Lactate: 2.2    Venous Blood Gas:  09-27 @ 11:15  7.42/45/61/29/90.4  VBG Lactate: 2.3    ABG - ( 21 Sep 2022 14:19 )  pH: 7.51  /  pCO2: 33    /  pO2: 158   / HCO3: 26    / Base Excess: 3.6   /  SaO2: 100.0     Venous Blood Gas:  09-19 @ 09:30  7.34/53/50/29/80.0  VBG Lactate: 3.5    Venous Blood Gas:  09-18 @ 03:50  7.39/48/47/29/78.2  VBG Lactate: 1.7    Procalcitonin, Serum: 0.23 ng/mL (09-18 @ 09:59)  D-Dimer Assay, Quantitative (09.18.22 @ 09:59)   D-Dimer Assay, Quantitative: 196:    C-Reactive Protein, Serum (09.18.22 @ 09:59)   C-Reactive Protein, Serum: 5 mg/L     Ferritin, Serum in AM (09.18.22 @ 09:59)   Ferritin, Serum: 63 ng/mL     < from: TTE with Doppler (w/Cont) (01.21.22 @ 14:08) >    Patient name: FRANKI MEHTA  YOB: 1935   Age: 86 (F)   MR#: [id number]  Study Date: 1/21/2022  ------------------------------------------------------------------------  Dimensions:    Normal Values:  LA:     2.5    2.0 - 4.0 cm  Ao:     3.3    2.0 - 3.8 cm  SEPTUM: 1.6    0.6 - 1.2 cm  PWT:    0.9    0.6 - 1.1 cm  LVIDd:  3.6    3.0 - 5.6 cm  LVIDs:  2.5    1.8 - 4.0 cm  Derived variables:  LVMI: 77 g/m2  RWT: 0.50  Fractional short: 31 %  EF (Visual Estimate): 70-75 %  ------------------------------------------------------------------------  Conclusions:  1. Concentric left ventricular hypertrophy.  2. Hyperdynamic left ventricular systolic function.  Endocardial visualization enhanced with intravenous  injection of Ultrasonic Enhancing Agent (Definity).  3. The right ventricle is not well visualized; grossly  normal right ventricular systolic function.  Pt refused to proceed with exam.  Limited Doppler study.  No trans aortic gradients.  Unable to rule out endocarditis.  ------------------------------------------------------------------------  Confirmed on 1/21/2022 - 15:42:57 by COMPA Castillo  ------------------------------------------------------------------------  ---------------------------------------------------------------------------------------------------------------    MICROBIOLOGY:     COVID-19 PCR . (09.19.22 @ 18:31)   COVID-19 PCR: Detected:    Respiratory Viral Panel with COVID-19 by RYAN (09.08.22 @ 21:38)   Rapid RVP Result: [facility]   SARS-CoV-2: [facility]  This Respiratory Panel uses polymerase chain reaction (PCR) to detect for   adenovirus; coronavirus (HKU1, NL63, 229E, OC43); human metapneumovirus   (hMPV); human enterovirus/rhinovirus (Entero/RV); influenza A; influenza   A/H1; influenza A/H3; influenza A/H1-2009; influenza B; parainfluenza   viruses 1, 2, 3, 4; respiratory syncytial virus; Mycoplasma pneumoniae;   Chlamydophila pneumoniae; and SARS-CoV-2.     Culture - Blood (09.08.22 @ 20:39)   Specimen Source: .Blood Blood-Peripheral   Culture Results:   No growth to date.     Culture - Blood (09.08.22 @ 20:25)   Specimen Source: .Blood Blood-Peripheral   Culture Results:   No growth to date.     RADIOLOGY:  [x ] Chest radiographs reviewed and interpreted by me     EXAM:  CT CHEST                          PROCEDURE DATE:  09/28/2022      FINDINGS:    AIRWAYS, LUNGS and PLEURA: Patent central airways. Stable subsegmental   atelectasis within posterior upper lobes, lingula and basilar lower   lobes. Trace fluid within the left fissure. Otherwise, no significant   pleural effusion or pneumothorax.    MEDIASTINUM AND GIOVANA: No lymphadenopathy.    HEART AND VESSELS: Heart size is normal. No pericardial effusion.   Thoracic aorta and pulmonary artery normal in diameter. Calcifications of   the coronary arteries and aorta.    VISUALIZED UPPER ABDOMEN: Within normal limits.    CHEST WALL AND BONES: New T8 compression deformity. Redemonstration of T2   and T3 compression fractures. Unchanged mild T11 wedge deformity.   Redemonstration of multiple lytic lesions in the thoracic spine,   unchanged from 9/14/2022. Fixation plate and screws within the left   proximal humerus. Status post right-sided mastectomy.    LOWER NECK: Within normal limits.    MISCELLANEOUS: Near-complete opacification of the right maxillary sinus.   Mucosal thickening of the left maxillary sinus.    IMPRESSION:    The airways are patent without mucoid impaction.    Mild bilateral areas of subsegmental atelectasis. The lungs are otherwise   clear.    Diffuse lytic bone lesions are again noted. Mild-moderate loss of body   height of T8 vertebral body is new from prior exam.    ADE CAMACHO MD; Resident Radiologist  This document has been electronically signed.  GIANNI MANTILLA MD; Attending Radiologist  This document has been electronically signed. Sep 29 2022 10:50AM  ---------------------------------------------------------------------------------------------------------------    EXAM:  XR CHEST PORTABLE URGENT 1V                          PROCEDURE DATE:  09/19/2022      FINDINGS:  Radiographic interpretation is limited by patient positioning and   overlying artifact.  Gastrostomy tube is noted.  Left humeral fixation plate and screws are noted.  The heart size cannot be accurately assessed in this projection.  Left basilar hazy opacities  Low lung volumes bilaterally.  There is no pneumothorax or large pleural effusion.  No acute bony abnormality.    IMPRESSION:  Left basilar hazy opacity may represent atelectasis.    AMY JARAMILLO MD; Resident Radiologist  This document has been electronically signed.  DIANE CLARK MD; Attending Radiologist  This document has been electronically signed. Sep 19 2022  6:08PM  --------------------------------------------------------------------------------------------------------------  EXAM:  CT CHEST                          PROCEDURE DATE:  09/14/2022      FINDINGS:    LUNGS AND AIRWAYS: Patent central airways.  Subsegmental atelectasis is   seen in the bilateral upper and lower lobes.  PLEURA: No pleural effusion.  MEDIASTINUM AND GIOVANA: No lymphadenopathy.  VESSELS: Calcifications of the aorta, aortic valve and coronary arteries.  HEART: Heart size is normal. No pericardial effusion.  CHEST WALL AND LOWER NECK: Within normal limits.  VISUALIZED UPPER ABDOMEN: Cholelithiasis.  BONES: Multiple lytic bone lesions are again seen including a destructive   lesion at the level of the T5 vertebral body that may extend into the   left neural foramen (3:42), unchanged from prior CT chest.    IMPRESSION:  Subsegmental atelectasis in the bilateral upper and lower lobes.    Lytic bone lesions as described above, unchanged from prior CT chest   8/29/2022. Recommend MR thoracic spine for further evaluation.     ALEXANDRIA COLLINS MD; Resident Radiologist  This document has been electronically signed.  HAY IRVIN MD; Attending Radiologist  This document has been electronically signed. Sep 14 2022  3:23PM  ---------------------------------------------------------------------------------------------------------------  EXAM:  DUPLEX SCAN EXT VEINS LOWER BI                          PROCEDURE DATE:  09/10/2022      IMPRESSION:  Limited study  No evidence of deep venous thrombosis in either lower extremity.  Limited visualization of the calf veins.    KEYLA ROMERO MD; Attending Radiologist  This document has been electronically signed. Sep 10 2022 10:42AM  ---------------------------------------------------------------------------------------------------------------  EXAM:  CT ABDOMEN AND PELVIS IC                          PROCEDURE DATE:  08/31/2022      IMPRESSION:    Multiple lytic lesions are seen throughout the axial and appendicular   skeleton, some of which appear increased in size from CT abdomen pelvis   3/16/2022 when evaluated in retrospect. A few lytic lesions involve the   left intertrochanteric region and right acetabulum.    Endometrial thickening. Recommend dedicated pelvic sonogram.    Cystic lesions are seen within the pancreas. Recommend a dedicated MRI on   a nonemergent basis.    SULY KENDRICK MD; Resident Radiology  This document has been electronically signed.  BRITTANI MALCOLM MD; Attending Radiologist  This document has been electronically signed. Sep  1 2022 11:01AM  ---------------------------------------------------------------------------------------------------------------  EXAM:  XR HUMERUS MIN 2 VIEWS RT                          PROCEDURE DATE:  08/30/2022      EXAM:  Internal/external rotation AP right humerus from 8/30/2022 at 0927.   Compared to appearance on previous day chest CT.    IMPRESSION:  Generalized osteopenia. Redemonstrated focal lytic lesion in proximal   medial humeral diaphysis with small area of permeative cortical   destruction. No additional radiographically appreciated suspicious lytic   or blastic lesions in remaining imaged regions.    No current fractures or dislocations.    Preserved shoulder and elbow joint spaces.    IV cannula with attached tubing overlies upper arm.    MINE NICE MD; Attending Radiologist  This document has been electronically signed. Aug 30 2022 10:39AM  ---------------------------------------------------------------------------------------------------------------  EXAM:  DUPLEX EXT VEINS UPPER LT                          PROCEDURE DATE:  09/01/2022      IMPRESSION:  No evidence of left upper extremity deep venous thrombosis.    FAWN FITZGERALD MD; Attending Radiologist  This document has been electronically signed. Sep  1 2022  1:10PM  ---------------------------------------------------------------------------------------------------------------

## 2022-10-07 NOTE — PROGRESS NOTE ADULT - ASSESSMENT
89    year old female    h/o hemorrhagic CVA, has  PEG,  HTN, MI,   HLD, gout, right ca  breast   right Ca breast. s/p mastectomy in 2019,  s/p  sentinel node  localization.  4/4,  were  negative  s/p rrt  . in past,  for hypoxia. cxr with complete  collapsed  of  left lung, needing broch,   s p  bronchoscopy , pt  with  tracheomalacia  and  collapsed  airways,  makes  this a  difficult  situation, as there is no good rx for this     h/o bacteremia. on   pna, perforated  appendicitis,  ?  mets  to  acetabulum, was  seen by dr pacheco   and  also, with  prior ,  echo, vegetation on  aortic  valve/ endocarditis,   and  per  card, pt is  at  high risk  for  esdras  on iv  vanco and ertapenem.  till  2/9/.22.        *  admitted with   presumed  resp  failure/ pt was  sent back from [facility], the same day   pt seen in er.  appears   at her naseline.  no  wheezing /  atousable    ENT eval w/ laryngoscopy notable for vocal cords mobile and intact, no edema, upper airway sherwood  *   s/p cva, has  PEG,  npo,  on  synthroid, Keppra  ,  *   HTN, on  cardizem,  per  card dr jamison  *     h/o  Ca breast. /, s/p  R  mastectomy  *   obesity, bmi  was   41, now  is  30   *    c/c left  leg contracture ,  remains  bed  bound. functional  paraplegia,  needs  assistance  for  all her  adl's   *  pt  with  h/o  tracheomalacia  and  collapsed  airways, propensity  for  lung collpse,   pt is  at risk for  re admissions     on nocturnal  bipap,  difficult  situation, a s there  is no  good  rx  for  pt's  recurrent lung collapse hypoxia     h/o  of  chronic   mild  tachycardia      *   CT chest. lytic  lesions, T  spine/  ribs/ humerus/  oncoloigy  d r ohri.  suspect  metastatic ca breast          son. Sy, guicho/ oncologist  has  also  discussed  with  son,  futility of  pursuing  bx. a s pt  is  not an ideal   candidate  for    chemo// CT  A.p ,/ prelim ,  pelvic  mets  per  oncology,   suspect  metastatic ca  breast,  was  awaiting   mri  spine/  pt unable  to tolerate  mri   per oncology, no  further  intervention/  and on  Arimidex, now,   per d r ohri   per  son,   rehab promptly  returned   to er/ as they  did not  have  a  bipap machine /  care cortn to f/.p, needs  24/7  O2/ nocturnal bipap   difficult  situation,  bed  bound. obesity. c/c  hypercarbia, needing , prn /  nocturnal bipap/  with intermittent tachypnea    was  covid +. on  remdissivir/, seen by  house  ID   pt  with  frequent episodes  of tachypnea. has  obstructive /restrictive   lung  disease ,  obesity/ RAQUEL/ hypercarbia  Afib, on xarelto  20m mg qd,    s/p tachycardia, on   cardizem  and digoxin/   pt  with poor  access   and  IR  note  seen, pt unable to lie  supine   pt at  baseline,  has  never  been  able  to  lie  supine,  given all  her  co  morbidities,/ pt  has  been  optimized   to  fullest  degree possible     on   prednisolone,  via  peg  pt 's  situation is tenuous   there  ie  no  room  for further    intervention. , CT  chest  prelim, atelectasis  pt  with  ongoing intermittent tachycardia/  worsening  tachypnea/    s/p  rrt on 9/30  for tachypnea   pt  with  tachypnea  mostly,  bed  bound,  hence  difficult  to  initiate   any   d/c  planning.  functional  quadriplegia   son remains  unavailable, lately    requested  palliative   care team  to  assist with  goc. at  this  juncture, given pt;s  condition, difficult  to initiaite   d/c planning,  given  her  unstable condition//    seen by  pallaitive care on 10/ 5/  pt  remains  full  code   continue  current  care    per  son,.  from before  pt is  full code

## 2022-10-08 LAB — NRBC # BLD: 3 /100 WBCS — HIGH (ref 0–0)

## 2022-10-08 NOTE — DISCHARGE NOTE FOR THE EXPIRED PATIENT - HOSPITAL COURSE
86F w/ extensive hx including severe tracheobronchomalacia (c/b hypoxia 2/2 mucous plugging and recurrent L lung collapse), hemorraghic CVA (6/2017) w/ residual L sided weakness and dysphagia s/p PEG, HTN, HLD, CAD s/p MI with preserved LVEF, mod AS with possible vegetation on aortic valve on prior TTE in 12/2021 (MIKALA not pursued given high risk, s/p extended course of abx), R breast CA s/p mastectomy (2019) c/b likely mets to bone, perforated appendicitis c/b abscess (12/2021), gout, former EtOH abuse, MRSE/MRSA+ in the past, presenting again on 9/19 for SOB shortly after discharge to [facility] rehab, w/ acute on chronic respiratory failure 2/2 tracheomalacia, found to be COVID +. She required BiPAP which had been weaned to nasal cannula, was started on steroids and completed a course of remdesivir.   Of note, pt has multiple recent admissions with similar presentation. Most recently was hospitalized from 8/28/22-9/8/22 for SOB, treated with airway clearance and completed 5-day course of Zosyn for empiric coverage of PNA (though per Pulm, unlikely to have PNA). Course c/b PEG displacement and subsequent reinsertion by IR on 8/30. In addition to her usual PEG feeds, pt was also started on puree diet with moderately thickened fluids for pleasure feeds. Also was started on anastrozole for most likely dx of metastatic breast cancer to bones (pt was unable to tolerate further cancer workup of spine lesions). Pt remained full code during recent admissions.  10/7 s/p code blue for VFib arrest with ROSC after approximately 15 minutes and intubation during ACLS. Patient transferred to MICU under CICU care for further management pending bed availability. Patient was on 3 high dose vasopressors and ultimately progressed to PEA arrest. Despite ACLS patient was unable to achieve ROSC.

## 2022-10-24 NOTE — PROVIDER CONTACT NOTE (OTHER) - REASON
October 24, 2022      [facility]  [address] [facility] [place] [address]  Phone: [phone]  Fax: [phone]       Patient: Farhat Trotter   YOB: 2017  Date of Visit: 10/24/2022    To Whom It May Concern:    Sierra Trotter  was at [facility] on 10/24/2022. The patient may return to work/school on 10/26/2022 without restrictions. If you have any questions or concerns, or if I can be of further assistance, please do not hesitate to contact me.    Sincerely,    PENNY Robertson      Patient HR remains elevated throughout shift, all other VSS, denies CP, SOB, dizziness, palpitations

## 2022-11-08 PROCEDURE — 85610 PROTHROMBIN TIME: CPT

## 2022-11-08 PROCEDURE — U0003: CPT

## 2022-11-08 PROCEDURE — 84132 ASSAY OF SERUM POTASSIUM: CPT

## 2022-11-08 PROCEDURE — 85018 HEMOGLOBIN: CPT

## 2022-11-08 PROCEDURE — 36600 WITHDRAWAL OF ARTERIAL BLOOD: CPT

## 2022-11-08 PROCEDURE — 82746 ASSAY OF FOLIC ACID SERUM: CPT

## 2022-11-08 PROCEDURE — 85025 COMPLETE CBC W/AUTO DIFF WBC: CPT

## 2022-11-08 PROCEDURE — 86923 COMPATIBILITY TEST ELECTRIC: CPT

## 2022-11-08 PROCEDURE — P9016: CPT

## 2022-11-08 PROCEDURE — 85014 HEMATOCRIT: CPT

## 2022-11-08 PROCEDURE — 36410 VNPNXR 3YR/> PHY/QHP DX/THER: CPT

## 2022-11-08 PROCEDURE — 87640 STAPH A DNA AMP PROBE: CPT

## 2022-11-08 PROCEDURE — 83735 ASSAY OF MAGNESIUM: CPT

## 2022-11-08 PROCEDURE — 87040 BLOOD CULTURE FOR BACTERIA: CPT

## 2022-11-08 PROCEDURE — 93005 ELECTROCARDIOGRAM TRACING: CPT

## 2022-11-08 PROCEDURE — 82565 ASSAY OF CREATININE: CPT

## 2022-11-08 PROCEDURE — 76937 US GUIDE VASCULAR ACCESS: CPT

## 2022-11-08 PROCEDURE — 82435 ASSAY OF BLOOD CHLORIDE: CPT

## 2022-11-08 PROCEDURE — 94660 CPAP INITIATION&MGMT: CPT

## 2022-11-08 PROCEDURE — 83615 LACTATE (LD) (LDH) ENZYME: CPT

## 2022-11-08 PROCEDURE — 85730 THROMBOPLASTIN TIME PARTIAL: CPT

## 2022-11-08 PROCEDURE — 82607 VITAMIN B-12: CPT

## 2022-11-08 PROCEDURE — 74018 RADEX ABDOMEN 1 VIEW: CPT

## 2022-11-08 PROCEDURE — 96374 THER/PROPH/DIAG INJ IV PUSH: CPT

## 2022-11-08 PROCEDURE — 82553 CREATINE MB FRACTION: CPT

## 2022-11-08 PROCEDURE — 84484 ASSAY OF TROPONIN QUANT: CPT

## 2022-11-08 PROCEDURE — 93970 EXTREMITY STUDY: CPT

## 2022-11-08 PROCEDURE — 94640 AIRWAY INHALATION TREATMENT: CPT

## 2022-11-08 PROCEDURE — 85045 AUTOMATED RETICULOCYTE COUNT: CPT

## 2022-11-08 PROCEDURE — 80076 HEPATIC FUNCTION PANEL: CPT

## 2022-11-08 PROCEDURE — U0005: CPT

## 2022-11-08 PROCEDURE — 84439 ASSAY OF FREE THYROXINE: CPT

## 2022-11-08 PROCEDURE — 84100 ASSAY OF PHOSPHORUS: CPT

## 2022-11-08 PROCEDURE — 84145 PROCALCITONIN (PCT): CPT

## 2022-11-08 PROCEDURE — C1894: CPT

## 2022-11-08 PROCEDURE — 0225U NFCT DS DNA&RNA 21 SARSCOV2: CPT

## 2022-11-08 PROCEDURE — 99285 EMERGENCY DEPT VISIT HI MDM: CPT | Mod: 25

## 2022-11-08 PROCEDURE — 86140 C-REACTIVE PROTEIN: CPT

## 2022-11-08 PROCEDURE — 84295 ASSAY OF SERUM SODIUM: CPT

## 2022-11-08 PROCEDURE — 71250 CT THORAX DX C-: CPT

## 2022-11-08 PROCEDURE — 82550 ASSAY OF CK (CPK): CPT

## 2022-11-08 PROCEDURE — 83880 ASSAY OF NATRIURETIC PEPTIDE: CPT

## 2022-11-08 PROCEDURE — 94002 VENT MGMT INPAT INIT DAY: CPT

## 2022-11-08 PROCEDURE — 85652 RBC SED RATE AUTOMATED: CPT

## 2022-11-08 PROCEDURE — 85027 COMPLETE CBC AUTOMATED: CPT

## 2022-11-08 PROCEDURE — 86850 RBC ANTIBODY SCREEN: CPT

## 2022-11-08 PROCEDURE — 86900 BLOOD TYPING SEROLOGIC ABO: CPT

## 2022-11-08 PROCEDURE — 36415 COLL VENOUS BLD VENIPUNCTURE: CPT

## 2022-11-08 PROCEDURE — 36430 TRANSFUSION BLD/BLD COMPNT: CPT

## 2022-11-08 PROCEDURE — 83036 HEMOGLOBIN GLYCOSYLATED A1C: CPT

## 2022-11-08 PROCEDURE — 82803 BLOOD GASES ANY COMBINATION: CPT

## 2022-11-08 PROCEDURE — 84443 ASSAY THYROID STIM HORMONE: CPT

## 2022-11-08 PROCEDURE — 82962 GLUCOSE BLOOD TEST: CPT

## 2022-11-08 PROCEDURE — 83605 ASSAY OF LACTIC ACID: CPT

## 2022-11-08 PROCEDURE — 82728 ASSAY OF FERRITIN: CPT

## 2022-11-08 PROCEDURE — 86901 BLOOD TYPING SEROLOGIC RH(D): CPT

## 2022-11-08 PROCEDURE — 71045 X-RAY EXAM CHEST 1 VIEW: CPT

## 2022-11-08 PROCEDURE — 80048 BASIC METABOLIC PNL TOTAL CA: CPT

## 2022-11-08 PROCEDURE — 80053 COMPREHEN METABOLIC PANEL: CPT

## 2022-11-08 PROCEDURE — 82330 ASSAY OF CALCIUM: CPT

## 2022-11-08 PROCEDURE — 85379 FIBRIN DEGRADATION QUANT: CPT

## 2022-11-08 PROCEDURE — 82947 ASSAY GLUCOSE BLOOD QUANT: CPT

## 2022-11-08 PROCEDURE — 87641 MR-STAPH DNA AMP PROBE: CPT

## 2022-11-17 NOTE — ED PROVIDER NOTE - NS ED MD DISPO ADMITTING SERVICE
End of Shift Note    Bedside shift change report given to Rylee (oncoming nurse) by Willa Priest RN (offgoing nurse).   Report included the following information SBAR, Kardex, ED Summary, Intake/Output, MAR and Recent Results    Shift worked:  7a-7p     Shift summary and any significant changes:    Pt received 1U on PRBC today and tolerated well, labs drawn, q6 H+H drawn, pt to have EGD tomorrow and to be NPO at midnight, consent has been signed   Concerns for physician to address: none   Zone phone for oncoming shift:  none         Willa Priest, RN Unable to obtain second set up cultures, patient refused any further blood draws today.  MD notified MED

## 2022-11-29 NOTE — PATIENT PROFILE ADULT - NSASFUNCLEVELADLAMBULATE_GEN_A_NUR
4 = completely dependent
81 yo male pmhx HIV (viral load not detectable 08/22), afib s/p DVVC and ablation, MV endocarditis s/p bioprosthetic valve replacement, BPH (self catheterizes), frequent UTIs presents to the ED c/o difficulty catheterizing x 1 week and generalized weakness today, vs significant for fever, phys exam grossly WNL. Concern for systemic bacterial infection given high fever, likely 2/2 UTI, pt well-appearing, will eval w/labs, bcx, ua+ucx, cxr, ekg, likely tba for further management pending w/u.

## 2022-12-04 NOTE — ASU PREOP CHECKLIST - SPO2 (%)
Patient brother called saying that the rehab refusing to take his brother because the surgery site is infected and this was not disclosed to the rehab patient brother is asking you if you can give him a call regarding this matter  96

## 2022-12-05 NOTE — ED ADULT NURSE NOTE - OBJECTIVE STATEMENT
pt to room via ambulance stretcher s/p fall.ts she fell from bed @ 6 amto go to bathroom  fell and coukld not get up pt waited til aid showed up with son , pt c/o r neck and back pain pt also with L sided weakness. code stroke call @ 1:40 kirstin all pertinent systems negative

## 2023-01-27 NOTE — H&P ADULT - HISTORY OF PRESENT ILLNESS
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 Unable to obtain hx from pt; history obtained from chart review.    Pt has multiple recent admissions with similar presentation. Most recently was hospitalized from 8/28/22-9/8/22 for SOB, treated with airway clearance and completed 5-day course of Zosyn for empiric coverage of PNA (though per Pulm, unlikely to have PNA). Course c/b PEG displacement and subsequent reinsertion by IR on 8/30. In addition to her usual PEG feeds, pt was also started on puree diet with moderately thickened fluids for pleasure feeds. Also was started on anastrozole for most likely dx of metastatic breast cancer to bones (pt was unable to tolerate further cancer workup of spine lesions). Pt remained full code per son.    In ED: HR 100s, RR 20s-30s, sating 99% on BIPAP FiO2 40%. WBC 11k. CXR with patchy atelectasis in LLL and low lung volume, otherwise no focal consolidations. Received solumedrol 125mg IVP x1, 2.25% racepinephrine 0.5mL x1, duoneb x2, vanc/cefepime x1 dose. Admitted to Medicine for further management.   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 Very limited hx obtained from pt given mental status, dyspnea, and use of BIPAP. Unable to reach sonSy. History obtained from staff/chart review.    Per ED staff who saw pt when she first arrived, pt was notably dyspneic with increased WOB and wheezing. Supposedly missed use of nightly BIPAP at rehab. Reportedly denied fever and cough. On encounter for admission, pt with BIPAP mask on, appearing slightly lethargic and mildly dyspneic, but was able to answer some questions. Pt endorsed having SOB. Denied pain. Seemed to believe initially that she was at [facility]? but was difficult to understand her responses.    Of note, pt has multiple recent admissions with similar presentation. Most recently was hospitalized from 8/28/22-9/8/22 for SOB, treated with airway clearance and completed 5-day course of Zosyn for empiric coverage of PNA (though per Pulm, unlikely to have PNA). Course c/b PEG displacement and subsequent reinsertion by IR on 8/30. In addition to her usual PEG feeds, pt was also started on puree diet with moderately thickened fluids for pleasure feeds. Also was started on anastrozole for most likely dx of metastatic breast cancer to bones (pt was unable to tolerate further cancer workup of spine lesions). Pt remained full code during recent admissions.    In ED: -110s, RR 20s, sating 99% on BIPAP FiO2 40%. WBC 11k. CXR with patchy atelectasis in LLL and low lung volume, otherwise no focal consolidations. Received solumedrol 125mg IVP x1, 2.25% racepinephrine 0.5mL x1, duoneb x2, vanc/cefepime x1 dose. Admitted to Medicine for further management.   decreased ability to use legs for bridging/pushing

## 2023-02-16 NOTE — PROGRESS NOTE ADULT - ASSESSMENT
89    year old female    h/o hemorrhagic CVA, has  PEG,  HTN, MI,   HLD, gout, right ca  breast   right Ca breast. s/p mastectomy in 2019,  s/p  sentinel node  localization.  4/4,  were  negative  s/p rrt  . in past,  for hypoxia. cxr with complete  collapsed  of  left lung, needing broch,   s p  bronchoscopy , pt  with  tracheomalacia  and  collapsed  airways,  makes  this a  difficult  situation, as there is no good rx for this     h/o bacteremia. on   pna, perforated  appendicitis,  ?  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 this will not alter rx. pt  redvd  extended  course  of ab   on iv  vanco and ertapenem.  till  2/9/.22.        *  admitted with   presumed  resp  failure/ pt was  sent back from [facility], the same day   pt seen in er.  appears   at her naseline.  no  wheezing /  atousable    ENT eval w/ laryngoscopy notable for vocal cords mobile and intact, no edema, upper airway patent     hypertonic saline for airway clearance   cxr, no pna     *   s/p cva, has  PEG,  npo  ,   on  synthroid, Keppra  ,  *   HTN, on  cardizem,  per  card dr jamison  *  h/o  Ca breast. /, s/p  R  mastectomy  *   obesity, bmi  was   41, now  is  30   *     c/c left  leg contracture ,  remains  bed  bound. functional  paraplegia,  needs  assistance  for  all her  adl's   *  pt  with  h/o  tracheomalacia  and  collapsed  airways, propensity  for  lung collpse,   pt is  at risk for  re admissions     on nocturnal  bipap,  difficult  situation, a s there  is no  good  rx  for  pt's  recurrent lung collapse hypoxia     h/o  of  chronic    mild  tachycardia          *   CT chest. lytic  lesions, T  spine/  ribs/ humerus/  oncoloigy  d r ohri.  suspect  metastatic ca breast        son. guicho Dan/ oncologist  has  also  discussed  with  son,  futility of  pursuing  bx. a s pt  is  not an ideal   candidate  for  chemo    CT  A.p ,/ prelim ,  pelvic  mets  per  oncology,   suspect  metastatic ca  breast,  was  awaiting   mri  spine/  pt unable  to tolerate  mri   per oncology, no  further  intervention/  and on  Arimidex, now,   per d r ohri   obesity,  bed bound.  functional paraplegia    per  son,   rehab promptly  returned   to er/ as they  did not  have  a  bipap machine /  care cortn to f/.p, needs  24/7  O2/ nocturnal bipap   difficult  situation,  bed  bound. obesity. c/c  hypercarbia, needing , prn /  nocturnal bipap/  with intermittent tachypnea    now  is  covid +. on iv steroid/ was  wheezing/ remdissivir/, seen by  house  ID   pt  with  frequent episodes  of tachypnea. has  obstructive /restrictive   lung  disease ,  obesity/ RAQUEL/ hypercarbia   called son, caseyple  times,  message  left/    pt  remains  at  her  baseline ,   verbal  at  times  Afib, on xarelto  20m mg qd    s/p tachycardia, on   cardizem  and digoxin/  remains on iv steroid  per  pulm    pt  with poor  access   and  IR  note  seen, pt unable to lie  supine   pt at  baseline,  has  never  been  able  to  lie  supine,  given all  her  co  morbidities,   and pt  has  been  optimized   to  fullest  degree possible     on   prednisolone,  via  peg  pt 's  situation is tenuous   there  ie  no  room  for further    intervention. , CT  chest  prelim, atelectasis  pt  with  ongoing intermittent tachycardia/  worsening  tachypnea/    s/p  rrt on 9/30  for tachypnea    hypernatremia   /  cbc pending   pt  with  tachypnea  mostly,  bed  bound,  hence  difficult  to  initiate   any   d/c  planning   son remains  unavailable       per  son, pt is  full code                    Target Cumulative Dosage (In Mg/Kg): 135

## 2023-03-08 NOTE — PROGRESS NOTE ADULT - PROBLEM SELECTOR PROBLEM 5
CVA (cerebral vascular accident)
CVA (cerebral vascular accident)
Dysphagia
CVA (cerebral vascular accident)
CVA (cerebral vascular accident)
Dysphagia
oral

## 2023-03-28 NOTE — ED ADULT NURSE NOTE - NS PRO PASSIVE SMOKE EXP
[FreeTextEntry1] : Ms Salmeron presents to the office today to discuss her recent echocardiogram and pleural effusion with IR drainage. She is frail and thin and requires full assistance with ADL. She had recently been short of breath and had a pleural effusion which was drained for over 800 ml at [facility]. On echocardiogram her EF is 25%. She presents for consideration of additional Mitral Clip. Given her history and prior clip with the current protocol I would recommend she be evaluated by Heart Failure (Dr Merino or Dr Oliver) for optimization prior to consideration of additional clip.Prior to any intervention she should have a Transesophageal Echocardiogram performed. \par \par PLAN:\par - Consultation with Heart Failure\par - Transesophageal Echocardiogram\par \par \par \par \par \par \par \par I, Dr. Figueroa, personally performed the evaluation and management (E/M) services for this patient.  That E/M includes conducting the initial examination, assessing all conditions, and establishing the plan of care.  Today, my ACP, Pawel Valencia NP was here to observe my evaluation and management services for this patient to be followed going forward.\par \par \par 
Unknown

## 2023-05-11 NOTE — ED ADULT NURSE REASSESSMENT NOTE - NS ED NURSE REASSESS COMMENT FT1
pt tolerating bipap- repositioned in stretcher. pending bed placement Patient requests all Lab, Cardiology, and Radiology Results on their Discharge Instructions

## 2023-06-11 NOTE — ED ADULT NURSE NOTE - PMH
CVA (cerebral vascular accident)    Dyslipidemia    ETOH abuse    Gout    HTN (hypertension)    MI (myocardial infarction)    Personal history of asbestosis    UTI (lower urinary tract infection) Attending Attestation (For Attendings USE Only)...

## 2023-06-29 NOTE — ED ADULT TRIAGE NOTE - RESPIRATORY RATE (BREATHS/MIN)
18 Bcc Adenoid Histology Text: There were aggregates of basaloid cells demonstrating an adenoid or cribiform pattern.

## 2023-08-01 NOTE — PROGRESS NOTE ADULT - ENMT
Contact PT regards to her appt on 8/4/23, with an  ID#[id number] Rob whom translated the message that her appt has been switch to 8/3/23 @ 3:20   PT understands the information that  was given to her   detailed exam nose/mouth

## 2023-08-24 NOTE — OB HISTORY
Detail Level: Detailed Detail Level: Generalized [___] : Living: [unfilled] [Currently In Menopause] : currently in menopause

## 2023-08-24 NOTE — PATIENT PROFILE ADULT - FALL HARM RISK - HARM RISK INTERVENTIONS
Assistance with ambulation/Assistance OOB with selected safe patient handling equipment/Communicate Risk of Fall with Harm to all staff/Reinforce activity limits and safety measures with patient and family/Tailored Fall Risk Interventions/Visual Cue: Yellow wristband and red socks/Bed in lowest position, wheels locked, appropriate side rails in place/Call bell, personal items and telephone in reach/Instruct patient to call for assistance before getting out of bed or chair/Non-slip footwear when patient is out of bed/[place] to call system/Physically safe environment - no spills, clutter or unnecessary equipment/Purposeful Proactive Rounding/Room/bathroom lighting operational, light cord in reach Metronidazole Pregnancy And Lactation Text: This medication is Pregnancy Category B and considered safe during pregnancy.  It is also excreted in breast milk.

## 2023-09-13 NOTE — PHYSICAL THERAPY INITIAL EVALUATION ADULT - LEVEL OF INDEPENDENCE: SUPINE/SIT, REHAB EVAL
PAST SURGICAL HISTORY:  Chronic GERD     History of D&C 2015    S/P bariatric surgery 2012 Gastric sleeve placement ( @ [facility])     unable to perform

## 2023-10-26 NOTE — PROGRESS NOTE ADULT - SUBJECTIVE AND OBJECTIVE BOX
[facility]    REVIEW OF SYSTEMS:  GEN: no fever,    no chills  RESP: no SOB,   no cough  CVS: no chest pain,   no palpitations  GI: no abdominal pain,   no nausea,   no vomiting,   no constipation,   no diarrhea  : no dysuria,   no frequency  NEURO: no headache,   no dizziness  PSYCH: no depression,   not anxious  Derm : no rash    MEDICATIONS  (STANDING):  acetaminophen    Suspension .. 800 milliGRAM(s) Oral every 6 hours  albuterol/ipratropium for Nebulization 3 milliLiter(s) Nebulizer every 6 hours  allopurinol 100 milliGRAM(s) Oral daily  atorvastatin 20 milliGRAM(s) Oral at bedtime  cyanocobalamin 1000 MICROGram(s) Oral daily  dextrose 5% + lactated ringers. 1000 milliLiter(s) (80 mL/Hr) IV Continuous <Continuous>  diltiazem    Tablet 30 milliGRAM(s) Oral three times a day  enoxaparin Injectable 40 milliGRAM(s) SubCutaneous every 24 hours  escitalopram 10 milliGRAM(s) Oral daily  guaiFENesin Oral Liquid (Sugar-Free) 300 milliGRAM(s) Oral every 6 hours  HYDROmorphone  Injectable 0.25 milliGRAM(s) IV Push once  levETIRAcetam  Solution 750 milliGRAM(s) Oral two times a day  levothyroxine 75 MICROGram(s) Oral daily  piperacillin/tazobactam IVPB.. 3.375 Gram(s) IV Intermittent every 8 hours  psyllium Powder 1 Packet(s) Oral daily  sodium chloride 3%  Inhalation 4 milliLiter(s) Inhalation every 12 hours    MEDICATIONS  (PRN):  HYDROmorphone  Injectable 0.25 milliGRAM(s) IV Push every 8 hours PRN Severe Pain (7 - 10)  traMADol 50 milliGRAM(s) Oral two times a day PRN Severe Pain (7 - 10)      Vital Signs Last 24 Hrs  T(C): 36.9 (31 Aug 2022 05:10), Max: 36.9 (30 Aug 2022 20:02)  T(F): 98.4 (31 Aug 2022 05:10), Max: 98.5 (30 Aug 2022 20:02)  HR: 92 (31 Aug 2022 05:10) (92 - 102)  BP: 151/56 (31 Aug 2022 05:10) (108/54 - 151/56)  BP(mean): --  RR: 20 (31 Aug 2022 05:10) (18 - 20)  SpO2: 100% (31 Aug 2022 05:10) (97% - 100%)    Parameters below as of 30 Aug 2022 20:02  Patient On (Oxygen Delivery Method): room air      CAPILLARY BLOOD GLUCOSE        I&O's Summary    30 Aug 2022 07:01  -  31 Aug 2022 07:00  --------------------------------------------------------  IN: 2500 mL / OUT: 550 mL / NET: 1950 mL        PHYSICAL EXAM:  HEAD:  Atraumatic, Normocephalic  NECK: Supple, No   JVD  CHEST/LUNG:   no     rales,     no,    rhonchi  HEART: Regular rate and rhythm;         murmur  ABDOMEN: Soft, Nontender, ;   EXTREMITIES:    no    edema  NEUROLOGY:  alert    LABS:                        8.9    10.03 )-----------( 249      ( 30 Aug 2022 12:12 )             29.7     08-30    139  |  100  |  31<H>  ----------------------------<  154<H>  3.4<L>   |  25  |  0.74    Ca    9.7      30 Aug 2022 12:12  Phos  3.4     08-30  Mg     2.2     08-30      PT/INR - ( 30 Aug 2022 12:12 )   PT: 13.8 sec;   INR: 1.20 ratio         PTT - ( 30 Aug 2022 12:12 )  PTT:32.1 sec                        Consultant(s) Notes Reviewed:      Care Discussed with Consultants/Other Providers:     Impulsivity

## 2023-12-28 NOTE — H&P ADULT - ASSESSMENT
Refill request from pharmacy for Atorvastatin 10mg 1 tab po qd. Last MWV done 7/12/22, next MWV scheduled for 1/10/24. Refill approved as requested. -BIBI  
82F w/ recent hemorrhagic CVA, HTN, Hx MI, ETOH, HLD, gout p/w SOB and reported respiratory distress and found to have hyperkalemia

## 2024-03-04 NOTE — ED PROVIDER NOTE - NS ED MD DISPO ADMITTING SERVICE
General Sunscreen Counseling: I recommended a broad spectrum sunscreen with a SPF of 30 or higher.  I explained that SPF 30 sunscreens block approximately 97 percent of the sun's harmful rays.  Sunscreens should be applied at least 15 minutes prior to expected sun exposure and then every 2 hours after that as long as sun exposure continues. If swimming or exercising sunscreen should be reapplied every 45 minutes to an hour after getting wet or sweating.  One ounce, or the equivalent of a shot glass full of sunscreen, is adequate to protect the skin not covered by a bathing suit. I also recommended a lip balm with a sunscreen. Sun protective clothing can be used in lieu of sunscreen but must be worn the entire time you are exposed to the sun's rays.
Detail Level: Zone
MED

## 2024-03-27 NOTE — ED CLERICAL - DIVISION
Behavioral Health IP Nursing Progress Note    Suicidal Ideation:  Denies SI, no plan or intent     Current C-SSRS score: Negative Screen - White (03/27/24 1500)      Protective Factors / Reason for Living: Social supports, Future orientation    Interventions:   Q 15 minute checks    Subjective: \"I just want to speak with the doctor. I want to set up doctor appointments and I want some outpatient resources.\"    Objective:   Mental Health: Patient behavior observed to be up ad supriya, walking the hallways, interacting with other patients and staff, pt presents anxious, ruminating about making appointments, pt calm/cooperative with medication administration and assessments. Pt communicates needs appropriately.     Medical:   [facility] Protocol: Progressing     Assessment / Actions:   PRN Medications given?   Clonazepam for anxiety-effective     Treatment team was notified about patient's request to set up outpatient resources.    Plan:   Treatment Plan reviewed.         [facility]...

## 2024-06-04 NOTE — PROGRESS NOTE ADULT - TIME-BASED BILLING (NON-CRITICAL CARE)
----- Message from DEACON Mojica sent at 6/4/2024 12:33 PM EDT -----  Please call patient and let him know I reviewed her most recent lab work.  His renal function/creatinine level is stable.  His sodium level is 133.  His potassium level is 4.4.  We will continue current medication regimen and will discuss at his upcoming appointment in September   Time-based billing (NON-critical care)

## 2024-07-31 NOTE — PROGRESS NOTE ADULT - SKIN
Infusion center states [facility] is restricted to OB patients. They have asked [facility] to resubmit with alternative in addition to Solumedrol IV Push, and Tylenol 500. [facility] is aware.   detailed exam warm and dry

## 2024-08-12 NOTE — DIETITIAN INITIAL EVALUATION ADULT. - ETIOLOGY
[de-identified] :  DOI 5/12/04 DOS: L3-4 laminectomy on 08/25/23. Wound revision 10/18/2023  8/12/24: Follow up [facility]. Persisting pain in lower back. CT scan review.   6/24/24: Follow up [facility]. 10 months s/p L3-4 laminectomy. Doing well attending PT. Remains with low back pain when walking for prolonged distances. Ambulating with a cane.   5/6/24: f/u LS. Has been progressively improving since surgery with PT. Still working on strengthening and gait/balance training with PT.   3/18/24: Follow Up- [facility] Spine. Hasn't had PT script for 1-month due information not being submitted to .   2/12/24: about 7 months s/p laminectomy, 4 months s/p wound revision. Doing well. Improving overall with PT. Getting stronger, walking further. Taking gabapentin 300mg 2-3x/day which has been helping the nerve pain. ambulating with cane when out of the house.   01/08/24: PO#2 s/p wound debridement on 10/18/23 and ~5 months s/p laminectomy. Started PT with good improvement.   11/20/23: first pov s/p wound debridement, irrigation and primary closure with plastic surgery - 3 months s/p laminectomy. Doing well. Some R shin pain with prolonged sitting. Using walker to ambulate prn. taking flexeril prn which helps. Saw Dr Trevizo last week who advised to wait 6 weeks prior to doing PT.  [FreeTextEntry5] : CT REVIEW L Spine neurological deficits

## 2024-12-13 NOTE — ED ADULT NURSE NOTE - CAS TRG GEN SKIN CONDITION
Attempted Post Procedure Phone Call.  Message left on voicemail to call with any questions or concerns.    
Warm

## 2025-05-09 NOTE — PROGRESS NOTE ADULT - ASSESSMENT
Doing great! Has lost about 15 lbs since starting phentermine. Continue active lifestyle and eating healthy!!   Orders:  •  phentermine 37.5 MG capsule; Take 1 capsule (37.5 mg total) by mouth every morning   82 year old female PMH  h/o hemorrhagic CVA, PEG,  HTN, MI,      HLD, gout   p/w SOB and  acute respiratory distress/ bipap  in er/ ,  elevated wbc and  hypokalemia.    probable aspiration pma/  gram negative  pna/on admission,  was on iv  zosyn  dementia  by  history   ct chest ,  multifocal pna   npo/ on peg feedings,  on  synthroid, Kepra , celexa     now  with  MRSA bacteremia/  staph  hemolyticus/ CNS/  s, epidermidis,  mlple  organisms isolated   rpt  blood  c/s, no growth   echo, no vegetations,  normal  ef    on  Cefepime/ Vanco/  ab to  be completed  on  10/ 27, pe r ID  ,  chronic  aspiration/  pna/  gram negative    hypernatremia/ RESOLVED, free  water via  peG     dm/  dr gilliam   cta  chest., no PE/  has multifocal pna  right breast  mass, unable  to  do  mammo as  an in pt/ surg onc  called/ dr scott roper      < from: CT Angio Chest w/ IV Cont (10.24.18 @ 17:08) >  IMPRESSION:   No pulmonary embolism.  Multifocal pneumonia.    Indeterminate right breast nodule, measuring 1.3 cm, for which further   evaluation with dedicated breast imaging is recommended.  < end of copied text >

## 2025-07-29 NOTE — ED ADULT TRIAGE NOTE - CHIEF COMPLAINT QUOTE
(resp distress

## (undated) DEVICE — VALVE BIOPSY BRONCHOVIDEOSCOPE

## (undated) DEVICE — SUCTION YANKAUER NO CONTROL VENT

## (undated) DEVICE — SYR LUER LOK 50CC

## (undated) DEVICE — NDL ASPIRATION 22G W SYR

## (undated) DEVICE — GOWN TRIMAX LG

## (undated) DEVICE — NDL HYPO SAFE 18G X 1.5" (PINK)

## (undated) DEVICE — Device

## (undated) DEVICE — TUBING SUCTION CONN 6FT STERILE

## (undated) DEVICE — TUBING SUCTION 20FT

## (undated) DEVICE — SYR LUER LOK 3CC

## (undated) DEVICE — DRAIN PLEUROVAC

## (undated) DEVICE — SOL INJ NS 0.9% 250ML

## (undated) DEVICE — FILTERLINE NASAL O2 CO2 ADLT

## (undated) DEVICE — SYR LUER LOK 10CC

## (undated) DEVICE — SOL INJ NS 0.9% 500ML 1-PORT

## (undated) DEVICE — ELCTR GROUNDING PAD ADULT COVIDIEN

## (undated) DEVICE — SUCTION CATH ARGYLE WHISTLE TIP 14FR STRAIGHT PACKED

## (undated) DEVICE — BALLOON SINGLE FOR BF-UC160F

## (undated) DEVICE — FOLEY HOLDER STATLOCK 2 WAY ADULT

## (undated) DEVICE — FILTERLINE ET TUBE PED/ADLT ETCO2

## (undated) DEVICE — DRSG CURITY GAUZE SPONGE 4 X 4" 12-PLY

## (undated) DEVICE — MASK O2 NON REBREATH 3IN1 ADULT

## (undated) DEVICE — NDL ASPIRATION 21G

## (undated) DEVICE — BRUSH CYTO ENDO

## (undated) DEVICE — VALVE SUCTION EVIS 160/200/240

## (undated) DEVICE — FORCEP RADIAL JAW 4 PEDIATRIC W NDL 1.8MM 2MM 160CM DISP

## (undated) DEVICE — TRAP SPECIMEN SPUTUM 40CC